# Patient Record
Sex: FEMALE | Race: BLACK OR AFRICAN AMERICAN | NOT HISPANIC OR LATINO | Employment: OTHER | URBAN - METROPOLITAN AREA
[De-identification: names, ages, dates, MRNs, and addresses within clinical notes are randomized per-mention and may not be internally consistent; named-entity substitution may affect disease eponyms.]

---

## 2017-01-03 ENCOUNTER — OFFICE VISIT (OUTPATIENT)
Dept: PSYCHIATRY | Facility: CLINIC | Age: 52
End: 2017-01-03
Payer: MEDICARE

## 2017-01-03 DIAGNOSIS — E11.8 TYPE 2 DIABETES MELLITUS WITH COMPLICATION, UNSPECIFIED LONG TERM INSULIN USE STATUS: ICD-10-CM

## 2017-01-03 DIAGNOSIS — Z01.818 PREOPERATIVE EVALUATION TO RULE OUT SURGICAL CONTRAINDICATION: Primary | ICD-10-CM

## 2017-01-03 DIAGNOSIS — E66.01 MORBID OBESITY DUE TO EXCESS CALORIES: ICD-10-CM

## 2017-01-03 PROCEDURE — 90791 PSYCH DIAGNOSTIC EVALUATION: CPT | Mod: S$PBB,,, | Performed by: PSYCHOLOGIST

## 2017-01-03 PROCEDURE — 90791 PSYCH DIAGNOSTIC EVALUATION: CPT | Mod: PBBFAC | Performed by: PSYCHOLOGIST

## 2017-01-03 PROCEDURE — 96102 PR PSYCHOLOGIC TESTING BY TECHNICIAN: CPT | Mod: PBBFAC | Performed by: PSYCHOLOGIST

## 2017-01-03 PROCEDURE — 96102 PR PSYCHOLOGIC TESTING BY TECHNICIAN: CPT | Mod: 59,S$PBB,, | Performed by: PSYCHOLOGIST

## 2017-01-03 PROCEDURE — 96101 PR PSYCHOLOGIC TESTING BY PSYCH/PHYS: CPT | Mod: PBBFAC | Performed by: PSYCHOLOGIST

## 2017-01-03 PROCEDURE — 96101 PR PSYCHOLOGIC TESTING BY PSYCH/PHYS: CPT | Mod: S$PBB,,, | Performed by: PSYCHOLOGIST

## 2017-01-03 NOTE — PROGRESS NOTES
Psychiatry Initial Visit (PhD/LCSW)   Diagnostic Interview - CPT 93200     Date: 2016    Site: [facility]     Referral source: Heriberto Clements M.D.     Clinical status of patient: Outpatient     Alivia Shearer, a 51 y.o. female, presented for initial evaluation visit. Before this evaluation was initiated, the purposes and process of the assessment and the limits of confidentiality were discussed with the patient who expressed understanding of these issues and orally consented to proceed with the evaluation.     Chief complaint/reason for encounter: Routine psychological evaluation prior to bariatric surgery.     Type of surgery sought: Sleeve    History of present illness: Ms. Shearer is a 51-year-old  female who is pursuing bariatric surgery to improve her health and quality of life. She has no history of significant psychological difficulties. She has never taken psychotropic medication, has never been hospitalized for psychiatric reasons, and denied any history of suicidality. She has begun making positive lifestyle changes in anticipation for surgery. The patient has a Body Mass Index of 60 as documented by the referring provider.    Ms. Shearer has struggled with weight since childhood, but has gained her largest amount of weight in the past 12 years since her mother . She believes that since her mother's death, she has been engaging more regularly in emotional eating as a result of grief. Additionally, she has been living with her father (age 94) for the past 3 years and has been his main caretaker, and watching his health slowly decline has led to further grief and emotional eating. Her main food weaknesses are starches (bread, rice, pasta) and fast food. She does not drink soda and does not tend to eat sweets much. However, she does admit that she tends to mindlessly snack (i.e. Eat half of a large can of cashews without meaning to while sitting in front of the TV). She  "also has the habit of cleaning her plate even when she is no longer hungry. Ms. Shearer has tried weight loss methods on her own, most notably Sugar Busters many years ago, when she lost 40 pounds. However, she has not made any recent weight loss attempts, mostly because she has gotten so heavy that she is fatigued often and exercise has become very challenging. She believes that her biggest weight loss challenge is "lack of motivation". Her motivation for seeking surgery now is to improve her health, particularly her Diabetes and her shortness of breath. Her postsurgical goals include: being able to go dancing again, and to walk around the [place] with her friends the way she used to, without needing frequent breaks.      Ms. Shearer has met with Ms. Rox Mitchell RD, bariatric dietician, and reports that she has made the following lifestyle changes since beginning the bariatric program: she has cut out fried foods, and she is drinking protein drinks for snacks. Her current weaknesses are limiting mayonnaise and salad dressing, and she does admit that she ate poorly during Lisbeth. She must continue meeting with Ms. Mitchell to demonstrate the implementation of lifestyle changes prior to clearance for bariatric surgery.    Medical history: Diabetes Type II, Sleep Apnea, Hyperlipidemia, DJD, Asthma    Pain: 8/10 - knees and back - takes Percocet as prescribed, states that she is "very careful" about how much she takes because of knowledge that it is addictive.    Psychiatric Symptoms:   Depression - She denied significant symptoms of depression; however, she does report increased crying over the holidays due to missing her mother, missing her girlfriend (whom she broke up with in September), and being sad about her father's condition. She was tearful in session, at appropriate times when discussing the loss of her mother. She denied any symptoms of anhedonia, hopelessness, thoughts of death, etc.  Vivian/Hypomania - " "denied significant symptoms of tracy/hypomania.  Anxiety - denied significant symptoms of anxiety; experienced one panic attack during sleep study 4 years ago.   Thoughts - denied delusions, hallucinations.  Suicidal thoughts/behaviors - denied.  Substance abuse - denied abuse or dependence.   Sleep - poor; estimates that she sleeps 2 hours per night because she is worried about her father falling and is always listening for him in the next room. She naps during the day which increases fatigue.  Self-injury - denied.    Current psychiatric treatment:  Medications: None.    Psychotherapy: None.    Treating clinicians: N/A    Health behaviors: Reported adherence to pharmacologic regimen.      Psychiatric history:   Previous diagnosis: Ms. Shearer has never sought psychiatric treatment in the past or received a psychiatric diagnosis. At the age of 18, she did suffer from an episode of depression when her eyesight became impaired due to severe conjuctivitis and she nearly became blind. No other episodes of depression. No history of anxiety besides one panic attack during sleep study. No history of tracy, psychosis, or suicidal ideation.    Previous hospitalizations: Denies.     History of outpatient treatment: Denies.    Previous suicide attempt: Denies.      Trauma history:  Denies.      Legal history:  She was caught stealing as a teenager.      History of eating disorders:  History of bulimia: Denies.    History of binge-eating episodes: Denies.      Family history of psychiatric illness: None reported.     Social history (marriage, employment, etc.): Ms. Shearer was born and raised in [place] by her biological parents, who were both teachers and she is the youngest of 7 siblings. She described her childhood as "beautiful" and reports that her family is still very close. She denies a history of childhood abuse. Ms. 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 She is a lesbian and recently broke up with a girlfriend due to her need to take care of father full time. She has never been  and has no children. She lives with her father in the [address] [place]. She identifies as Gnosticism.     Current psychosocial stressors: Father's health.    Report of coping skills: Prayer, crossword puzzles, talking to her close friends.    Support system: Her brother is also in workup for bariatric surgery right now, and they support each other. Her other siblings are also supportive of her desire to have surgery. She has told a few close friends. Although her father supports her health, he always wants to eat unhealthy foods ("lots of grease") and living with him has made it more difficult for her to eat well.    Substance use:   Alcohol: Denied current use; denied history of abuse or dependency.   Drugs: Denied current use; denied history of abuse or dependency.  Tobacco: None.   Caffeine: Denied routine use.    Current medications and drug reactions (include OTC, herbal): see medication list     Strengths and liabilities: Strength: Patient accepts guidance/feedback, Strength: Patient is expressive/articulate., Strength: Patient is intelligent., Strength: Patient is motivated for change., Strength: Patient has positive support network., Strength: Patient has reasonable judgment., Strength: Patient is stable.     Current Evaluation:    Mental Status Exam:   General Appearance:  age appropriate, well dressed, neatly groomed, overweight    Speech:  normal tone, normal rate, normal pitch, normal volume    Level of Cooperation:  cooperative    Thought Processes:  normal and logical    Mood:  euthymic    Thought Content:  normal, no suicidality, no homicidality, delusions, or paranoia    Affect:  congruent and appropriate    Orientation:  oriented x3    Memory:  recent memory intact; immediate and delayed word recall " "3/3  remote memory intact; able to recall remote personal events   Attention Span & Concentration:  spelled "WORLD" forwards and backwards without errors   Fund of General Knowledge:  appropriate for education    Abstract Reasoning:  interpretation of proverbs was abstract; interpretation of similarities was abstract   Judgment & Insight:  good    Language  intact        Diagnostic Impression - Plan:      Diagnostic Impression:  Z01.818 Pre-operative examination   E66.01   Morbid obesity  E11.8     Diabetes Type II  Z68.44    Body Mass Index of 60    Summary/Conclusion:   There are no overt psychological contraindications for proceeding with bariatric surgery. Ms. Shearer has no significant psychiatric history, and reports no current psychiatric problems or major adjustment issues. However, it is clear that she is struggling with her father's failing health as well as her recent breakup as she was tearful when discussing these issues, and she recognizes a relationship between her difficult emotions and her eating. It would benefit Ms. Shearer to seek counseling to help her improve coping skills and to provide her with a space to discuss her struggles, as she does not want to do so with family or friends.     Recommendations:  -This patient is psychologically cleared to proceed with bariatric surgery; however, psychotherapy is highly recommended for the reasons stated above. Ms. Shearer has agreed with this recommendation and has been set up to continue seeing me for semi-regular psychotherapy.    Please see Psychological Testing report available in Notes tab of Chart Review in Epic for results of psychological testing.  "

## 2017-01-03 NOTE — PSYCH TESTING
[facility] [address] [place]  [address]  [phone]    REPORT OF PSYCHOLOGICAL TESTING    NAME: Alivia Shearer  OC #: [id number]  : 1965    REFERRED BY: Heriberto Clements M.D.    EVALUATED BY:  Dinorah Suárez, Ph.D., Clinical Psychologist  Wilbert Hays M.S., Psychometrician    DATES OF EVALUATION: 2016, 2017    EVALUATION PROCEDURES AND TIMES:  Conducted by Psychologist:  Interpretation and report of test data  Integration of information from diagnostic interview, medical record, and testing data  Conducted by Technician:  [place] Multiphasic Personality Inventory - 2 Restructured Form (MMPI-2RF)  [facility] Behavioral Medicine Diagnostic (MBMD)  CPT Codes and Time:  11466 - 2 hours; 51138 - 2 hours    EVALUATION FINDINGS:  Before this evaluation was initiated, the purposes and process of the assessment and the limits of confidentiality were discussed with the patient who expressed understanding of these issues and orally consented to proceed with the evaluation.    Ms. Shearer has struggled with weight since childhood, but has gained her largest amount of weight in the past 12 years since her mother . She believes that since her mother's death, she has been engaging more regularly in emotional eating as a result of grief. Additionally, she has been living with her father (age 94) for the past 3 years and has been his main caretaker, and watching his health slowly decline has led to further grief and emotional eating. Her main food weaknesses are starches (bread, rice, pasta) and fast food. She does not drink soda and does not tend to eat sweets much. However, she does admit that she tends to mindlessly snack (i.e. Eat half of a large can of cashews without meaning to while sitting in front of the TV). She also has the habit of cleaning her plate even when she is no longer hungry. Ms. Shearer has tried weight loss methods on her own, most notably Sugar  "Busters many years ago, when she lost 40 pounds. However, she has not made any recent weight loss attempts, mostly because she has gotten so heavy that she is fatigued often and exercise has become very challenging. She believes that her biggest weight loss challenge is "lack of motivation". Her motivation for seeking surgery now is to improve her health, particularly her Diabetes and her shortness of breath. Her postsurgical goals include: being able to go dancing again, and to walk around the [place] with her friends the way she used to, without needing frequent breaks.    Ms. Shearer denies a significant psychiatric history and has never attended mental health treatment. She denies a history of eating disorder, but does engage in binge-like episodes at night.    At her initial consultation with Ms. Belgicanikkie Pateltodd on 12/13/2016, her BMI was 60. Her current medical comorbidities include: Diabetes Type II, Hyperlipidemia, DJD, Sleep Apnea, and Asthma. She has met with Ms. Rox Mitchell RD, bariatric dietician and was well-informed regarding the details of the procedure and post-operative eating restrictions. She has a good understanding regarding the risks and benefits of the procedure and appears motivated for change. She was fully cooperative and engaged in the assessment process.    Ms. Shearer produced a valid and interpretable MMPI-2RF protocol; therefore, her test results should be considered an accurate representation of her current symptoms and problems. Ms. Ferrara profile is in the normal range for all symptom categories, indicating that she does not currently struggle with any severe psychiatric problems, symptoms, or adjustment issues.    The MBMD indicated that Ms. Shearer is not reporting any significant psychiatric problems at this time. However, she is reporting more suspiciousness toward others than the typical medical patient and may therefore be guarded and vigilant. In general, Ms. Shearer is " reluctant to disclose her emotions, seeking to maintain control at all costs. Because she thinks of herself as strong and capable, being a patient will be very difficult for her, and she may be sarcastic or irritable with treatment providers. Ms. Shearer identified her spiritual sharan as her coping mechanism for stress. There is little reason to believe that her psychological characteristics will lead to a complicated recovery from surgery. Test results indicate that, compared to the average bariatric surgery patient, there is an average chance that she will engage in appropriate behavioral changes to support optimal surgery results, and there is a good chance that surgery will improve her quality of life. Ms. Shearer is likely to benefit from a bariatric support group and a nutritional instruction plan after surgery.     DIAGNOSTIC IMPRESSIONS:  Z68.44    Body Mass Index of 60  Z01.818  Pre-operative Exam  E66.901  Morbid  Obesity    SUMMARY AND RECOMMENDATIONS:  Ms. Shearer has a long history of weight problems and is pursuing bariatric surgery at this time in an effort to improve her health and quality of life. Results of personality testing should be considered valid, and they indicate that she is not currently suffering from any psychiatric symptoms or problems that would contraindicate surgery.   .

## 2017-01-17 NOTE — TELEPHONE ENCOUNTER
----- Message from Shaun Cristina Jr. sent at 1/17/2017  8:44 AM CST -----  X_  1st Request  _  2nd Request  _  3rd Request    Please refill the medication(s) listed below. Please call the patient when the prescription(s) is ready for  at the phone number [phone]    Medication #1  oxycodone-acetaminophen (PERCOCET)  mg per tablet 120 tablet 0 12/16/2016 1/15/2017 No  Sig - Route: Take 1 tablet by mouth every 6 (six) hours as needed for Pain. - Oral  Class: Print  Order: [id number]  Cosign for Ordering: Accepted by Lina Wagner MD on 12/15/2016  3:23 PM  Date/Time Signed: 12/15/2016  8:34 AM      Patient was contacted and scheduled for a follow up visit for her med refill         Medication #2      Preferred Pharmacy:    [facility] [address]

## 2017-01-18 ENCOUNTER — OFFICE VISIT (OUTPATIENT)
Dept: PAIN MEDICINE | Facility: CLINIC | Age: 52
End: 2017-01-18
Payer: MEDICARE

## 2017-01-18 VITALS
WEIGHT: 293 LBS | TEMPERATURE: 98 F | SYSTOLIC BLOOD PRESSURE: 146 MMHG | BODY MASS INDEX: 48.82 KG/M2 | DIASTOLIC BLOOD PRESSURE: 86 MMHG | HEART RATE: 91 BPM | RESPIRATION RATE: 20 BRPM | HEIGHT: 65 IN

## 2017-01-18 DIAGNOSIS — M25.562 CHRONIC PAIN OF BOTH KNEES: ICD-10-CM

## 2017-01-18 DIAGNOSIS — G89.29 CHRONIC PAIN OF BOTH KNEES: ICD-10-CM

## 2017-01-18 DIAGNOSIS — M25.561 CHRONIC PAIN OF BOTH KNEES: ICD-10-CM

## 2017-01-18 DIAGNOSIS — T84.018D FAILED TOTAL KNEE ARTHROPLASTY, SUBSEQUENT ENCOUNTER: ICD-10-CM

## 2017-01-18 DIAGNOSIS — Z96.659 FAILED TOTAL KNEE ARTHROPLASTY, SUBSEQUENT ENCOUNTER: ICD-10-CM

## 2017-01-18 DIAGNOSIS — Z96.651 STATUS POST TOTAL RIGHT KNEE REPLACEMENT: ICD-10-CM

## 2017-01-18 DIAGNOSIS — Z79.891 ENCOUNTER FOR LONG-TERM OPIATE ANALGESIC USE: ICD-10-CM

## 2017-01-18 DIAGNOSIS — M47.816 FACET ARTHRITIS OF LUMBAR REGION: Primary | ICD-10-CM

## 2017-01-18 DIAGNOSIS — M47.816 LUMBAR FACET ARTHROPATHY: ICD-10-CM

## 2017-01-18 PROCEDURE — 99214 OFFICE O/P EST MOD 30 MIN: CPT | Mod: S$PBB,,, | Performed by: NURSE PRACTITIONER

## 2017-01-18 PROCEDURE — 99999 PR PBB SHADOW E&M-EST. PATIENT-LVL III: CPT | Mod: PBBFAC,,, | Performed by: NURSE PRACTITIONER

## 2017-01-18 PROCEDURE — 80307 DRUG TEST PRSMV CHEM ANLYZR: CPT

## 2017-01-18 PROCEDURE — 99213 OFFICE O/P EST LOW 20 MIN: CPT | Mod: PBBFAC | Performed by: NURSE PRACTITIONER

## 2017-01-18 RX ORDER — OXYCODONE AND ACETAMINOPHEN 10; 325 MG/1; MG/1
1 TABLET ORAL EVERY 6 HOURS PRN
Qty: 120 TABLET | Refills: 0 | Status: SHIPPED | OUTPATIENT
Start: 2017-01-18 | End: 2017-02-15 | Stop reason: SDUPTHER

## 2017-01-18 NOTE — PROGRESS NOTES
Subjective:      Patient ID: Alivia Thomas is a 51 y.o. female.    Chief Complaint: Knee Pain (one month follow up)    Referred by: No ref. provider found     Interval History 1/18/2017:  The patient returns for follow up and medication refill.  She reports no major changes in her back and knee pain since her last visit.  She has had a lot going on with health issues of family members.  She takes care of her father and her brother, who were both recently hospitalized.  She still plans on having weight loss surgery in the future.  Her pain today is.  She is taking Percocet with benefit and without side effects at this time.    Interval History 12/15/2016:  The patient returns today for follow up of lower back and bilateral knee pain.  She continues to report relief from cooled lumbar RFAs in October.  She feels as though the colder weather is causing increased knee pain.  Since her last visit, she has decided to have weight loss surgery.  She was evaluated by bariatrics and is undergoing pre-op workup at this time.  Her pain today is 8/10.  She continue to take Percocet with significant benefit.      Interval History 11/3/2016:  The patient returns today for follow up of back pain.  She is s/p left then right L2,3,4,5 cooled RFA completed on 10/19/16 with 80% pain relief.  She is very satisfied with these results.  She continues to take Percocet with relief.  She has started kick boxing classes and continues to lose weight.  She has noticeable weight loss since her last visit and is very happy about this.  Her pain today is 8/10.    Interval History 9/16/2016:  The patient returns today with complaints of lower back and knee pain.  Her worst pain is in her lower back without radiation.  She has lost weight since her last weight.  She has increased her exercise which is helping.  She continues with her diet plan.  She is having the pool at her home fixed and plans to use this for exercise, as she has benefited  from frequent pool therapy at [facility].  She previously had significant relief with lumbar RFAs in April for about 4 months.  She would like to repeat the procedures.  She continues to take Percocet as needed which provides her relief.  Her pain today is 8/10.  The patient denies any bowel or bladder incontinence or signs of saddle paresthesia.      Interval History 8/19/2016:  The patient returns today for follow up and medication refill.  She complains of back and knee pain.  Her back pain does not radiate.  She did have relief with RFA in April but feels the pain is returning.  Her previous UTI has resolved.  She has been unable to return to her aquatic therapy because she is caring for her father.  She plans to start walking in the morning before her father wakes up.  She has gained a few pounds since her last visit and is upset about this, as she was previously losing at each visit.  She is also trying to control her diet.  She continues to take Percocet with significant pain relief.  Her pain today is 8/10.  The patient denies any bowel or bladder incontinence or signs of saddle paresthesia.  The patient denies any major medical changes since last office visit.    Interval History 7/19/2016:  The patient returns today for follow up.  She has a history of lower back and bilateral knee pain.  Since her last visit, she reports being diagnosed with a UTI and is currently on antibiotics. She has still been unable to return to aquatherapy.  She has been performing a home exercise routine.  She has lost 6 lbs since her visit last month.  She is taking Percocet as needed for pain.  She reports efficacy without adverse effects.  Her pain today is 7/10.      Interval History 6/20/2016:  Patient returns for follow up and medication refill.  She has a history of lower back and bilateral knee pain.  Since her last encounter, she reports that she has been suffering with an upper respiratory infection.  She saw   Debra last week and was started on oral Augmentin and antibiotic eye drops.  She reports that whenever she is sick, she suffers with swelling and drainage to her left eye.  She states that her symptoms have improved since starting the eye drops.  She has been unable to participate in her daily pool therapy since she has been sick but is anxious to resume once she is feeling better.  She did have recent labwork which showed an improving A1C with cholesterol and triglycerides WNL.  She is proud of herself about this, as she reports be very good with her diet recently.  Her pain today is an 8/10.    Interval History 5/5/2016:  Patient returns today for procedure follow up.  She is s/p left then right L2,3,4,5 RFA completed on 4/20/16 with 70% pain relief so far.  She reports lower back and bilateral knee pain.  She has had genicular nerve blocks in the past which provided significant relief for her left knee pain and limited relief of her right knee pain.  She is currently doing physical therapy per self.  She is doing 45 minutes of pool therapy five days per week.  She thinks that this is helping with her pain and mobility.  She is trying to lose weight because she is aware that this will help with her pain.  She is taking percocet as needed which provided relief without side effects.  Her pain today is a 5/10.      Interval History: 3/28/2016:  Patient returns today for follow up of lower back and bilateral knee pain.  She is s/p Bilateral L2,3,4,5 MBB on 2/16/16 with 80% relief for 5 days and bilateral L2,3,4,5 MBB on 3/1/16 with 70% pain relief for 1 day.  She is requesting to schedule the RFAs.  The worst of her pain is located to her left lower back and does not radiate. She is still complaining of bilateral knee pain which is worse with walking and activity.  Her pain today is a 9/10.  The patient denies any bowel/bladder incontinence or symptoms of saddle paresthesia.  The patient denies any major medical  changes since last OV. She is currently taking Percocet which helps her pain without any adverse effects.     Interval History: 12/17/2015:  Patient presents in clinic for follow up for lower back, bilateral knee, and right arm pain. Her pain is 9/10 today. She underwent carpal tunnel revision 12/14/15 in the right wrist. She is currently out of her Percocet 10-325mg prescription.   Cont to have significant low back pain, wh will also ich she reports is her worst current pain.  Based on previous imaging we know that the patient has significant facet arthropathy in the lumbar spine at the levels of L3-4 and L4-5 L5-S1.  She describes dull achy with occasional sharp pain rates as 7/10.     Interval history 10/26/2015:  Patient returns to clinic for follow up previously seen for knee pain and low back pain. She reports pain is improved with Percocet 10/325 BID. She is no longer taking Lyrica and recently started Topamax. She continues to take Celebrex once per day and does help. She also continues to use a topical cream PRN and does help some. She has completed therapy since last visit but she is continuing to exercise regularly. Her pain in back and knees is unchanged in quality and distribution from previous visits. She otherwise denies any new issues at this time.     Interval history 9/21/2015:  Since previous encounter the patient comes in after having started using gabapentin and developing swelling in her legs.  She states that it did make a difference for her pain although she discontinued it secondary to swelling after a decrease in her dosing did not alleviate this.  She followed up with her orthopedist and did have x-rays performed which did not show any significant change compared to previous.  She is scheduled for a four-month follow-up.  She has not yet begun exercising but will begin soon she is scheduled for pool-based therapy.  The opioid medications have been helping her and her pain twice a day.   Further decrease to once a day prevented her from being able to function.  She does continue to take Celebrex without adverse reaction.  Additionally the patient stated that she has been having lower back pain which is new.    Interval History 08-: Since previous visit patient comes in today to discuss her medications.  Patient states she is having pain in the lower back and both knee, sharp , throbbing , burning, and stabbing pain, she rates it 8/10.  Patient is taking percocet 10/325mg, we have been weaning her from this medication last prescription was provided for 30 tablets.  Additionally the patient is taking Celebrex with regularity with some improvement in her pain.  She has completed physical therapy status post knee replacement her knee hardware appears appropriate she has a scheduled appointment to follow-up with her orthopedic surgeon in one week to discuss using a extension brace while she is at home laying in bed.  She continues to swim twice a week and is actively trying to lose weight and has been dieting.    Interval History 06/19/2015:  Patient presents in clinic for two month follow up. She reports her bilateral knee pain and low back pain is an 8/10. She currently takes celebrex and Percocet for pain and uses a topical cream.  She was recently evaluated by her orthopedist and the hardware all appears to be appropriately placed and the next follow-up is in 6 weeks.  The patient continues to work on weight loss and exercise and states that she has been doing more than in the past.   Patient reports no other health changes since previous encounter.    Interval History 04/09/2015:  Patient presents in clinic for one month follow up. She reports bilateral knee pain and low back pain is a 9/10 today. She currently takes percocet for pain as needed and uses a cane for ambulation. She states that the low back pain is new.  She continues to have bilateral knee pain and is in physical therapy.  She  continues to take Celebrex daily and uses a topical pain cream regularly.    Patient reports no other health changes since previous encounter.    Interval history 3/5/2015:  Since previous encounter patient is status post right total knee replacement on 11/4/2014 and has been healed with postoperative visits showing good progress.  The patient does have continued physical therapy sessions and has been attempting to lose weight and has lost approximately 25 pounds although her BMI continues to be 54.  She has been making good efforts to try and continue to increase her range of motion and lose weight.  She continues to have significant pain in bilateral knees and continues to use a cane for ambulation.  She was receiving oxycodone/acetaminophen 10/325 every 8 hours by mouth when necessary and requiring in order to persist in her physical therapy although the topical pain cream that she has been applying has been helping her to a limited degree she still requires the medication regularly.  Her recent x-ray imaging shows good positioning of the prosthesis.  No other health changes since previous encounter.     Interval history 2/17/2014:  Patient reports that she has been using the topical compounded cream on the knee approximately 4 times per day and she states that it does help her with her pain that she is experiencing.  She states that she has not gone to formal physical therapy but that she has been going to a pool that has exercise classes which is free for her and that she feels like it is helping her continue to lose weight.  Additionally she continues using hydrocodone/acetaminophen 7.5/750 approximately 3 times per day when necessary and states that also helps with her pain symptoms.  He she's still talks to have replacement for the left knee, but would like to lose weight further before going to that.  She has also had previous injections into her knees which have offered little to no relief in her pain  symptoms.  She has had no other health changes since previous encounter.    Previous encounter 1/21/2014:  HPI Comments: 49 yo female presents for initial evaluation of bilateral knee pain, L>R. She is s/p arthroscopic right knee surgery and eventual replacement and then revision surgeries (Dr. Swan, Orthopedics). The pain is present in both knees and is described as a terrible ache. She hears occasional popping in both knees with movement. The pain is worse with being on her feet and getting up from a sitting to standing position. Denies lower ext weakness or paresthesias. She does not have back pain or pain radiating down her legs. The pain is better with Celebrex and rest. She also takes Vicodin ES TID but this makes her very sleepy. She is not sure it helps with the pain because she usually falls asleep.     Physical Therapy: not since 2011 just prior to her 3rd right knee surgery (revision after replacement); has tried swimming which helps with weight loss    Non-pharmacologic Treatment: none    Pain Medications: Percocet and celebrex    Blood thinners: ASA 81 mg daily     Interventional Therapies: steroid inj and visco-supplementation (series of 3) in left knee- not helpful  3/31/14 Bilateral genicular nerve block- significant relief of left knee, limited relief of right knee pain  2/16/16 Bilateral L2,3,4,5 MBB  3/1/16 Bilateral L2,3,4,5 MBB   4/6/16 Left L2,3,4,5 RFA- significant relief  4/20/16 Right L2,3,4,5 RFA- significant relief  10/5/16 Left L2,3,4,5 cooled RFA  10/19/16 Right L2,3,4,5 cooled RFA      Relevant Surgeries: right knee surgery x3 (arthroscopic, then replacement and subsequent revision surgery), s/p left total knee arthroplasty    Relevant Imaging:  Xray Lumbar spine 09/21/2015  Lumbar spine radiograph    Comparison: None    Results: AP, lateral neutral, lateral flexion , lateral extension, bilateral oblique and spot views. The alignment of the lumbar spine demonstrates a mild  levoscoliosis . 11-mm anterior listhesis of L4 relative to L5 with no translational abnormalities  seen on flexion and extension views. The vertebral body heights are well-maintained , mild disk space narrowing L4-L5 and L5-S1. Mild anterior and marginal osteophyte formation seen throughout the lumbar spine . The oblique views demonstrate no   definite spondylolysis. There is facet joint osseous hypertrophy noted at L3-L4 and L4-L5.      Impression         The Significant spondylosis of the lumbar spine with grade 1 anterior listhesis L4 relative to L5.  Facet joint osseous hypertrophy L3-L4 and L4-5.      Electronically signed by: BLESSING ROE MD  Date: 09/21/15  Time: 09:56           knee x-rays (see below) x-ray knee bilateral 8/26/2015:  Standing AP knees, lateral view of both knees and sunrise view of both patella. Study compared to May 2015. Postop change of bilateral knee replacement. The prosthetic components are in satisfactory position. Erosive changes involving the patella   again evident bilaterally.    Impression no significant change.      Xray Bilateral Knee 05/25/2015:  There are bilateral total knee arthroplasties with posterior resurfacing of the patella. As observed on 12/17/2014 there is a fracture of the left patella in the parasagittal plane.    Heterotopic bone is evident about each knee.    I detect no dislocation, unusual radiopaque retained foreign body, lytic or blastic lesion, or chondrocalcinosis.    Lab Results   Component Value Date    HGBA1C 10.6 (H) 12/15/2016     Lab Results   Component Value Date    CHOL 200 (H) 12/15/2016    CHOL 153 06/17/2016    CHOL 157 04/01/2015     Lab Results   Component Value Date    HDL 40 12/15/2016    HDL 44 06/17/2016    HDL 44 04/01/2015     Lab Results   Component Value Date    LDLCALC 141.0 12/15/2016    LDLCALC 92.6 06/17/2016    LDLCALC 97.2 04/01/2015     Lab Results   Component Value Date    TRIG 95 12/15/2016    TRIG 82 06/17/2016     TRIG 79 04/01/2015     Lab Results   Component Value Date    CHOLHDL 20.0 12/15/2016    CHOLHDL 28.8 06/17/2016    CHOLHDL 28.0 04/01/2015         Past Medical History   Diagnosis Date    Asthma     Diabetes mellitus type II     DJD (degenerative joint disease) of knee 6/19/2014    Hyperlipidemia     Morbid obesity     Sleep apnea      Past Surgical History   Procedure Laterality Date    Carpal tunnel release      Carpal tunnel release  1980s     left    Carpal tunnel release  2012     right    Knee surgery  3/2010     orthroscope    Knee surgery  6-19-14     left TKR    Joint replacement Bilateral      with 2 revisions on rt     Family History   Problem Relation Age of Onset    Diabetes Mother     Hypertension Mother     Cataracts Mother     Diabetes Father     Cataracts Father     Coronary artery disease Brother     Amblyopia Neg Hx     Blindness Neg Hx     Cancer Neg Hx     Glaucoma Neg Hx     Macular degeneration Neg Hx     Retinal detachment Neg Hx     Strabismus Neg Hx     Stroke Neg Hx     Thyroid disease Neg Hx      Social History     Social History    Marital status: Single     Spouse name: N/A    Number of children: N/A    Years of education: N/A     Occupational History    Not on file.     Social History Main Topics    Smoking status: Never Smoker    Smokeless tobacco: Never Used    Alcohol use Yes      Comment: occasionally     Drug use: No    Sexual activity: Yes     Partners: Female     Birth control/ protection: None     Other Topics Concern    Not on file     Social History Narrative    Disabled. The patient is the youngest of 6 siblings. Single. Lives with single-sex partner.                      Review of patient's allergies indicates:  No Known Allergies    Current Outpatient Prescriptions:     albuterol 90 mcg/actuation inhaler, Take 2 puffs every 4-6 hours  as needed for shortness of breath/wheezing, Disp: 1 Inhaler, Rfl: 6    aspirin (ECOTRIN) 81 MG EC  tablet, Take 1 tablet by mouth every morning. prevents heart attacks and strokes, Disp: , Rfl:     atorvastatin (LIPITOR) 20 MG tablet, Take 1 tablet (20 mg total) by mouth once daily., Disp: 30 tablet, Rfl: 6    blood sugar diagnostic Strp, Freestyle light strips and lancets, Disp: 100 each, Rfl: 6    celecoxib (CELEBREX) 200 MG capsule, One capsule daily, Disp: 30 capsule, Rfl: 2    econazole nitrate 1 % cream, Apply topically 2 (two) times daily., Disp: 30 g, Rfl: 2    ergocalciferol (ERGOCALCIFEROL) 50,000 unit Cap, Take 1 capsule (50,000 Units total) by mouth twice a week., Disp: 24 capsule, Rfl: 0    fluconazole (DIFLUCAN) 150 MG Tab, One daily for 3 days, Disp: 3 tablet, Rfl: 3    insulin detemir (LEVEMIR FLEXPEN) 100 unit/mL (3 mL) SubQ InPn pen, Inject 20 Units into the skin every evening., Disp: 1 Box, Rfl: 11    insulin lispro (HUMALOG) 100 unit/mL injection, Inject 11 Units into the skin 3 (three) times daily before meals. Plus Sliding scale, Disp: 3 mL, Rfl: 11    lisinopril (PRINIVIL,ZESTRIL) 2.5 MG tablet, One daily for proteinuria., Disp: 30 tablet, Rfl: 6    metformin (GLUCOPHAGE-XR) 500 MG 24 hr tablet, Take 2 tablets (1,000 mg total) by mouth 2 (two) times daily., Disp: 360 tablet, Rfl: 6    omeprazole (PRILOSEC) 20 MG capsule, Take 1 capsule (20 mg total) by mouth every morning., Disp: 30 capsule, Rfl: 6    vitamin D 185 MG Tab, Take 5,000 mg by mouth once daily., Disp: , Rfl:     REVIEW OF SYSTEMS:    GENERAL:  Patient is actively trying to lose weight, but has no malaise or fevers.  RESPIRATORY:  Negative for cough, wheezing or shortness of breath, patient denies any recent URI.  CARDIOVASCULAR:  Negative for chest pain, leg swelling or palpitations.  GI:  Negative for abdominal discomfort, blood in stools or black stools or change in bowel habits, occasional constipation.  MUSCULOSKELETAL:  See HPI.  SKIN:  Negative for lesions, rash, and itching.  PSYCH:  No mood disorder or  "recent psychosocial stressors.  Patient's sleep is disturbed secondary to pain (patient reports also that she has insomnia).  HEMATOLOGY/LYMPHOLOGY:  Negative for prolonged bleeding, bruising easily or swollen nodes.  81 mg aspirin  ENDO: Patient has a history of diabetes  NEURO:   No history of headaches, syncope, paralysis, seizures or tremors.  All other reviewed and negative other than HPI.    OBJECTIVE:    Visit Vitals    BP (!) 146/86    Pulse 91    Temp 98.1 °F (36.7 °C) (Oral)    Resp 20    Ht 5' 5" (1.651 m)    Wt (!) 163 kg (359 lb 5.6 oz)    BMI 59.8 kg/m2       PHYSICAL EXAMINATION:    GENERAL: Well appearing, in no acute distress, alert and oriented x3.   PSYCH:  Mood and affect appropriate.  SKIN: Skin color, texture, turgor normal, no rashes or lesions.  HEAD/FACE:  Normocephalic, atraumatic.   CV: RRR with palpation of the radial artery.  PULM: No evidence of respiratory difficulty, symmetric chest rise.  BACK: No pain to palpation over the lumbar facet joints.  Limited lumbar extension with pain.  Full ROM on flexion.  Positive bilateral facet loading.  Negative SLR bilaterally.  Pain with palpation to bilateral SI joints.  JENNA is negative bilaterally.  EXTREMITIES:  Well-healed midline scars to bilateral knees.  Painful extension and flexion of bilateral knees.  Medial and lateral joint line tenderness to bilateral knees.    MUSCULOSKELETAL: Bilateral upper and lower extremity strength is normal and symmetric.  No atrophy or tone abnormalities are noted.  NEURO: Bilateral lower extremity coordination and muscle stretch reflexes are physiologic and symmetric.  Plantar response are downgoing. No clonus.  No loss of sensation is noted.  GAIT: Antalgic- ambulating without assistance.       Assessment:       Encounter Diagnoses   Name Primary?    Facet arthritis of lumbar region Yes    Failed total knee arthroplasty, subsequent encounter     Lumbar facet arthropathy     Chronic pain of both " knees     Status post total right knee replacement     Encounter for long-term opiate analgesic use          Plan:       - Previous imaging was reviewed and discussed with the patient today.     - Continue to f/u with bariatrics for possible gastric sleeve surgery.    - Can repeat cooled RFA after April 2017.    - Continue oxycodone-acetaminophen 10/325 one tablet every 6 hours PRN pain.  This will be e-prescribed by Dr. Wagner.    - The [place] Board of Pharmacy website for prescription monitoring was consulted today, and it does not suggest any deviations in conflict with the patient's controlled substance contract with our clinic. Will continue current therapy with frequent monitoring of the controlled substance database, and urine drug screens on followup.     - UDS from 7/21/16 was reviewed and is compliant.  UDS was performed today.  Preliminary results are consistent with medications as prescribed and negative for illicit substances.  This may be negative for her medication as she is overdue.    - Continue topical pain cream PRN.    - RTC in 1 month or sooner if needed.    - Dr. Wagner was consulted on the patient and agrees with this plan.      The above plan and management options were discussed at length with patient. Patient is in agreement with the above and verbalized understanding.     Virginia Sanchez, EMILY  01/18/2017

## 2017-01-18 NOTE — MR AVS SNAPSHOT
[facility]  [address]  [place] [address]  Phone: [phone]  Fax: [phone]                  Alivia Thomas   2017 8:00 AM   Office Visit    Description:  Female : 1965   Provider:  EMILY Huggins   Department:  [facility]           Reason for Visit     Knee Pain           Diagnoses this Visit        Comments    Facet arthritis of lumbar region    -  Primary     Failed total knee arthroplasty, subsequent encounter         Lumbar facet arthropathy         Chronic pain of both knees         Status post total right knee replacement         Encounter for long-term opiate analgesic use                To Do List           Future Appointments        Provider Department Dept Phone    2017 7:30 AM [facility] - Optometry [phone]    2/15/2017 8:00 AM EMILY Huggins [facility] [phone]    3/27/2017 9:15 AM Theodore Swan MD [facility] - Orthopedics [phone]    3/29/2017 8:00 AM Stacey Rowland D[facility] - Podiat[facility] [phone]      Goals (5 Years of Data)     None      [facility]     [facility] Nurse [facility] Line -  Assistance  Registered nurses in the [facility] On Call Center provide clinical advisement, health education, appointment booking, and other advisory services.  Call for this free service at [phone].             Medications           Message regarding Medications     Verify the changes and/or additions to your medication regime listed below are the same as discussed with your clinician today.  If any of these changes or additions are incorrect, please notify your healthcare provider.             Verify that the below list of medications is an accurate representation of the medications you are currently taking.  If none reported, the list may be blank. If incorrect, please contact your healthcare provider. Carry this list with you in case of  "emergency.           Current Medications     albuterol 90 mcg/actuation inhaler Take 2 puffs every 4-6 hours  as needed for shortness of breath/wheezing    aspirin (ECOTRIN) 81 MG EC tablet Take 1 tablet by mouth every morning. prevents heart attacks and strokes    atorvastatin (LIPITOR) 20 MG tablet Take 1 tablet (20 mg total) by mouth once daily.    blood sugar diagnostic Strp Freestyle light strips and lancets    celecoxib (CELEBREX) 200 MG capsule One capsule daily    econazole nitrate 1 % cream Apply topically 2 (two) times daily.    ergocalciferol (ERGOCALCIFEROL) 50,000 unit Cap Take 1 capsule (50,000 Units total) by mouth twice a week.    fluconazole (DIFLUCAN) 150 MG Tab One daily for 3 days    insulin detemir (LEVEMIR FLEXPEN) 100 unit/mL (3 mL) SubQ InPn pen Inject 20 Units into the skin every evening.    insulin lispro (HUMALOG) 100 unit/mL injection Inject 11 Units into the skin 3 (three) times daily before meals. Plus Sliding scale    lisinopril (PRINIVIL,ZESTRIL) 2.5 MG tablet One daily for proteinuria.    metformin (GLUCOPHAGE-XR) 500 MG 24 hr tablet Take 2 tablets (1,000 mg total) by mouth 2 (two) times daily.    omeprazole (PRILOSEC) 20 MG capsule Take 1 capsule (20 mg total) by mouth every morning.    vitamin D 185 MG Tab Take 5,000 mg by mouth once daily.           Clinical Reference Information           Vital Signs - Last Recorded  Most recent update: 1/18/2017  8:07 AM by Daisy Draper MA    BP Pulse Temp Resp Ht Wt    (!) 146/86 91 98.1 °F (36.7 °C) (Oral) 20 5' 5" (1.651 m) (!) 163 kg (359 lb 5.6 oz)    BMI                59.8 kg/m2          Blood Pressure          Most Recent Value    BP  (!)  146/86      Allergies as of 1/18/2017     No Known Allergies      Immunizations Administered on Date of Encounter - 1/18/2017     None      Orders Placed During Today's Visit      Normal Orders This Visit    [facility] Drug Screen       [facility] Sign-Up     Activating your [facility] account is " as easy as 1-2-3!     1) Visit [email or web address], select Sign Up Now, enter this activation code and your date of birth, then select Next.  [email or web address]  Expires: 1/27/2017 10:35 AM      2) Create a username and password to use when you visit [facility] in the future and select a security question in case you lose your password and select Next.    3) Enter your e-mail address and click Sign Up!    Additional Information  If you have questions, please e-mail [email or web address] or call [phone] to talk to our [facility] staff. Remember, [facility] is NOT to be used for urgent needs. For medical emergencies, dial 911.

## 2017-01-24 ENCOUNTER — OFFICE VISIT (OUTPATIENT)
Dept: OPTOMETRY | Facility: CLINIC | Age: 52
End: 2017-01-24
Payer: MEDICARE

## 2017-01-24 DIAGNOSIS — H52.4 MYOPIA WITH ASTIGMATISM AND PRESBYOPIA, BILATERAL: ICD-10-CM

## 2017-01-24 DIAGNOSIS — H52.203 MYOPIA WITH ASTIGMATISM AND PRESBYOPIA, BILATERAL: ICD-10-CM

## 2017-01-24 DIAGNOSIS — E11.9 TYPE 2 DIABETES MELLITUS WITHOUT RETINOPATHY: Primary | ICD-10-CM

## 2017-01-24 DIAGNOSIS — H25.13 NUCLEAR SCLEROSIS, BILATERAL: ICD-10-CM

## 2017-01-24 DIAGNOSIS — H52.13 MYOPIA WITH ASTIGMATISM AND PRESBYOPIA, BILATERAL: ICD-10-CM

## 2017-01-24 PROCEDURE — 99999 PR PBB SHADOW E&M-EST. PATIENT-LVL II: CPT | Mod: PBBFAC,,, | Performed by: OPTOMETRIST

## 2017-01-24 PROCEDURE — 92014 COMPRE OPH EXAM EST PT 1/>: CPT | Mod: S$PBB,,, | Performed by: OPTOMETRIST

## 2017-01-24 PROCEDURE — 99212 OFFICE O/P EST SF 10 MIN: CPT | Mod: PBBFAC,PO | Performed by: OPTOMETRIST

## 2017-01-24 NOTE — PROGRESS NOTES
HPI     DLS:  4/7/16  Pt here for diabetic eye check.  Pt without visual complaints today OU.  Diabetic eye exam  (+)floaters  (-)flashes  (-)itching  (-)burning  (-)eye pain  (-)headaches    Hemoglobin A1C       Date                     Value               Ref Range             Status                12/15/2016               10.6 (H)            4.5 - 6.2 %           Final              Comment:    According to ADA guidelines, hemoglobin A1C <7.0% represents  optimal   control in non-pregnant diabetic patients.  Different  metrics may apply   to specific populations.   Standards of Medical Care in Diabetes -   2016.  For the purpose of screening for the presence of diabetes:  <5.7%       Consistent with the absence of diabetes  5.7-6.4%  Consistent with   increasing risk for diabetes   (prediabetes)  >or=6.5%  Consistent with   diabetes  Currently no consensus exists for use of hemoglobin A1C  for   diagnosis of diabetes for children.         06/17/2016               9.0 (H)             4.5 - 6.2 %           Final                 01/25/2016               9.6 (H)             4.5 - 6.2 %           Final            ----------       Last edited by Justin Dodson, OD on 1/24/2017  8:38 AM.     ROS     Negative for: Constitutional, Gastrointestinal, Neurological, Skin,   Genitourinary, Musculoskeletal, HENT, Endocrine, Cardiovascular, Eyes,   Respiratory, Psychiatric, Allergic/Imm, Heme/Lymph    Last edited by Justin Dodson, OD on 1/24/2017  8:03 AM. (History)        Assessment /Plan     For exam results, see Encounter Report.    Type 2 diabetes mellitus without retinopathy - Both Eyes    Nuclear sclerosis, bilateral    Myopia with astigmatism and presbyopia, bilateral      1. No diabetic retinopathy, no csme. Return in 1 year for dilated eye exam.  2. Educated pt on presence of cataracts and effects on vision. No surgery at this time. Recheck in one year.  3. Copy of Spec Rx given. Different lens options  discussed with patient. RTC 1 year full exam.

## 2017-01-26 ENCOUNTER — OFFICE VISIT (OUTPATIENT)
Dept: PSYCHIATRY | Facility: CLINIC | Age: 52
End: 2017-01-26
Payer: MEDICARE

## 2017-01-26 DIAGNOSIS — F43.22 ADJUSTMENT DISORDER WITH ANXIETY: Primary | ICD-10-CM

## 2017-01-26 PROCEDURE — 90834 PSYTX W PT 45 MINUTES: CPT | Mod: S$PBB,,, | Performed by: PSYCHOLOGIST

## 2017-01-26 PROCEDURE — 90834 PSYTX W PT 45 MINUTES: CPT | Mod: PBBFAC | Performed by: PSYCHOLOGIST

## 2017-01-26 NOTE — PROGRESS NOTES
"Individual Psychotherapy (PhD/LCSW)    1/26/2017    Site:  [facility]         Therapeutic Intervention: Met with patient.  Outpatient - Supportive psychotherapy 45 min - CPT Code 70205    Chief complaint/reason for encounter: Emotional eating     Interval history and content of current session: Ms. Shearer attends her first counseling session today post-bariatric evaluation. She reports that the past several weeks since our meeting have been "stressful". Her father travelled with her sister to [place] to visit her brother and ended up with internal bleeding and a one-week ICU stay there. She felt highly "uneasy" from the moment he left the house, as she has been so used to his company, and then became very anxious once she learned that he was in the hospital. Since he returned last week, he has been very weak and pants a lot. His follow up appointment in [place] is not until 2/10, so she is feeling very worried about him and is reticent to leave him alone. Pt has noticed that her eating has worsened in the past two weeks since this was all going on; she has been snacking a lot to calm herself down on food that she ostensibly buys for her father (i.e. Chocolate, chips) and she cancelled her dietician visit since she felt "almost comatose" with helplessness while her father was away and in the hospital. Pt also explained more about her decision to break up with her girlfriend of 10 years; though her desire to spend all of her time with her father played a role, she also states that her girlfriend has started speaking to other women toward the end of their relationship and lied about it, causing her to feel uneasy and lack trust.     Intervention: Pt states that her main goal is to keep herself healthy so that she can continue caring for her father, and she is worried that her eating will get so out of control that she will die before he does. Began working on anxiety-reducing techniques. Also worked " behaviorally to discuss how she might remove snack food from the home and instead work with her father on taking him out every few days for some type of treat. Pt would like to continue coming to therapy to talk out some of her worries about her father and to work on improved coping skills.    Treatment plan:  · Target symptoms: emotional eating  · Why chosen therapy is appropriate versus another modality: relevant to diagnosis, patient responds to this modality  · Outcome monitoring methods: self-report, observation  · Therapeutic intervention type: behavior modifying psychotherapy, supportive psychotherapy    Risk parameters:  Patient reports no suicidal ideation  Patient reports no homicidal ideation  Patient reports no self-injurious behavior  Patient reports no violent behavior    Verbal deficits: None    Patient's response to intervention:  The patient's response to intervention is accepting.    Progress toward goals and other mental status changes:  The patient's progress toward goals is fair .    Diagnosis:   Adjustment Disorder w/ Anxious mood    Plan:  individual psychotherapy    Return to clinic: 3 weeks    Length of Service (minutes): 45

## 2017-01-28 LAB
6MAM UR QL: NOT DETECTED
7AMINOCLONAZEPAM UR QL: NOT DETECTED
A-OH ALPRAZ UR QL: NOT DETECTED
ALPRAZ UR QL: NOT DETECTED
AMPHET UR QL SCN: NOT DETECTED
ANNOTATION COMMENT IMP: NORMAL
ANNOTATION COMMENT IMP: NORMAL
BARBITURATES UR QL: NOT DETECTED
BUPRENORPHINE UR QL: NOT DETECTED
BZE UR QL: NOT DETECTED
CARBOXYTHC UR QL: NOT DETECTED
CARISOPRODOL UR QL: NOT DETECTED
CLONAZEPAM UR QL: NOT DETECTED
CODEINE UR QL: NOT DETECTED
CREAT UR-MCNC: 125.3 MG/DL (ref 20–400)
DIAZEPAM UR QL: NOT DETECTED
ETHYL GLUCURONIDE UR QL: NOT DETECTED
FENTANYL UR QL: NOT DETECTED
HYDROCODONE UR QL: NOT DETECTED
HYDROMORPHONE UR QL: NOT DETECTED
LORAZEPAM UR QL: NOT DETECTED
MDA UR QL: NOT DETECTED
MDEA UR QL: NOT DETECTED
MDMA UR QL: NOT DETECTED
MEPERIDINE UR QL: NOT DETECTED
METHADONE UR QL: NOT DETECTED
METHAMPHET UR QL: NOT DETECTED
MIDAZOLAM UR QL SCN: NOT DETECTED
MORPHINE UR QL: NOT DETECTED
NORBUPRENORPHINE UR QL CFM: NOT DETECTED
NORDIAZEPAM UR QL: NOT DETECTED
NORFENTANYL UR QL: NOT DETECTED
NORHYDROCODONE UR QL CFM: NOT DETECTED
NOROXYCODONE UR QL CFM: PRESENT
OXAZEPAM UR QL: NOT DETECTED
OXYCODONE UR QL: PRESENT
OXYMORPHONE UR QL: NOT DETECTED
OXYMORPHONE UR QL: NOT DETECTED
PATHOLOGY STUDY: NORMAL
PCP UR QL: NOT DETECTED
PHENTERMINE UR QL: NOT DETECTED
PPAA UR QL: NOT DETECTED
PROPOXYPH UR QL: NOT DETECTED
SERVICE CMNT-IMP: NORMAL
TAPENTADOL UR QL SCN: NOT DETECTED
TAPENTADOL UR QL SCN: NOT DETECTED
TEMAZEPAM UR QL: NOT DETECTED
TRAMADOL UR QL: NOT DETECTED
ZOLPIDEM UR QL: NOT DETECTED

## 2017-02-15 ENCOUNTER — OFFICE VISIT (OUTPATIENT)
Dept: PAIN MEDICINE | Facility: CLINIC | Age: 52
End: 2017-02-15
Payer: MEDICARE

## 2017-02-15 ENCOUNTER — OFFICE VISIT (OUTPATIENT)
Dept: PSYCHIATRY | Facility: CLINIC | Age: 52
End: 2017-02-15
Payer: MEDICARE

## 2017-02-15 VITALS
WEIGHT: 293 LBS | BODY MASS INDEX: 48.82 KG/M2 | SYSTOLIC BLOOD PRESSURE: 134 MMHG | HEIGHT: 65 IN | HEART RATE: 98 BPM | DIASTOLIC BLOOD PRESSURE: 79 MMHG | TEMPERATURE: 98 F

## 2017-02-15 DIAGNOSIS — Z79.891 ENCOUNTER FOR LONG-TERM OPIATE ANALGESIC USE: ICD-10-CM

## 2017-02-15 DIAGNOSIS — M25.562 CHRONIC PAIN OF BOTH KNEES: ICD-10-CM

## 2017-02-15 DIAGNOSIS — M25.561 CHRONIC PAIN OF BOTH KNEES: ICD-10-CM

## 2017-02-15 DIAGNOSIS — G89.29 CHRONIC PAIN OF BOTH KNEES: ICD-10-CM

## 2017-02-15 DIAGNOSIS — F43.22 ADJUSTMENT DISORDER WITH ANXIETY: Primary | ICD-10-CM

## 2017-02-15 DIAGNOSIS — T84.018D FAILED TOTAL KNEE ARTHROPLASTY, SUBSEQUENT ENCOUNTER: ICD-10-CM

## 2017-02-15 DIAGNOSIS — Z96.659 FAILED TOTAL KNEE ARTHROPLASTY, SUBSEQUENT ENCOUNTER: ICD-10-CM

## 2017-02-15 DIAGNOSIS — M47.816 FACET ARTHRITIS OF LUMBAR REGION: Primary | ICD-10-CM

## 2017-02-15 DIAGNOSIS — G89.4 CHRONIC PAIN DISORDER: ICD-10-CM

## 2017-02-15 PROCEDURE — 99214 OFFICE O/P EST MOD 30 MIN: CPT | Mod: S$PBB,,, | Performed by: NURSE PRACTITIONER

## 2017-02-15 PROCEDURE — 90834 PSYTX W PT 45 MINUTES: CPT | Mod: S$PBB,,, | Performed by: PSYCHOLOGIST

## 2017-02-15 PROCEDURE — 99999 PR PBB SHADOW E&M-EST. PATIENT-LVL III: CPT | Mod: PBBFAC,,, | Performed by: NURSE PRACTITIONER

## 2017-02-15 PROCEDURE — 90834 PSYTX W PT 45 MINUTES: CPT | Mod: PBBFAC | Performed by: PSYCHOLOGIST

## 2017-02-15 RX ORDER — OXYCODONE AND ACETAMINOPHEN 10; 325 MG/1; MG/1
1 TABLET ORAL EVERY 6 HOURS PRN
Qty: 120 TABLET | Refills: 0 | Status: SHIPPED | OUTPATIENT
Start: 2017-02-15 | End: 2017-03-14 | Stop reason: SDUPTHER

## 2017-02-15 NOTE — PROGRESS NOTES
Subjective:      Patient ID: Alivia Thomas is a 51 y.o. female.    Chief Complaint: Low-back Pain    Referred by: Referral, Self     Interval History 2/15/2017:  The patient returns today for follow up and medication refill.  She is still planning on having weight loss surgery in the future.  She admits that she has not been as active as previously.  She has a follow up with Dr. Swan scheduled next month.  She continues to take Percocet with benefit.  Her pain today is 8/10.      Interval History 1/18/2017:  The patient returns for follow up and medication refill.  She reports no major changes in her back and knee pain since her last visit.  She has had a lot going on with health issues of family members.  She takes care of her father and her brother, who were both recently hospitalized.  She still plans on having weight loss surgery in the future.  Her pain today is.  She is taking Percocet with benefit and without side effects at this time.    Interval History 12/15/2016:  The patient returns today for follow up of lower back and bilateral knee pain.  She continues to report relief from cooled lumbar RFAs in October.  She feels as though the colder weather is causing increased knee pain.  Since her last visit, she has decided to have weight loss surgery.  She was evaluated by bariatrics and is undergoing pre-op workup at this time.  Her pain today is 8/10.  She continue to take Percocet with significant benefit.      Interval History 11/3/2016:  The patient returns today for follow up of back pain.  She is s/p left then right L2,3,4,5 cooled RFA completed on 10/19/16 with 80% pain relief.  She is very satisfied with these results.  She continues to take Percocet with relief.  She has started kick boxing classes and continues to lose weight.  She has noticeable weight loss since her last visit and is very happy about this.  Her pain today is 8/10.    Interval History 9/16/2016:  The patient returns today  with complaints of lower back and knee pain.  Her worst pain is in her lower back without radiation.  She has lost weight since her last weight.  She has increased her exercise which is helping.  She continues with her diet plan.  She is having the pool at her home fixed and plans to use this for exercise, as she has benefited from frequent pool therapy at [facility].  She previously had significant relief with lumbar RFAs in April for about 4 months.  She would like to repeat the procedures.  She continues to take Percocet as needed which provides her relief.  Her pain today is 8/10.  The patient denies any bowel or bladder incontinence or signs of saddle paresthesia.      Interval History 8/19/2016:  The patient returns today for follow up and medication refill.  She complains of back and knee pain.  Her back pain does not radiate.  She did have relief with RFA in April but feels the pain is returning.  Her previous UTI has resolved.  She has been unable to return to her aquatic therapy because she is caring for her father.  She plans to start walking in the morning before her father wakes up.  She has gained a few pounds since her last visit and is upset about this, as she was previously losing at each visit.  She is also trying to control her diet.  She continues to take Percocet with significant pain relief.  Her pain today is 8/10.  The patient denies any bowel or bladder incontinence or signs of saddle paresthesia.  The patient denies any major medical changes since last office visit.    Interval History 7/19/2016:  The patient returns today for follow up.  She has a history of lower back and bilateral knee pain.  Since her last visit, she reports being diagnosed with a UTI and is currently on antibiotics. She has still been unable to return to aquatherapy.  She has been performing a home exercise routine.  She has lost 6 lbs since her visit last month.  She is taking Percocet as needed for pain.  She  reports efficacy without adverse effects.  Her pain today is 7/10.      Interval History 6/20/2016:  Patient returns for follow up and medication refill.  She has a history of lower back and bilateral knee pain.  Since her last encounter, she reports that she has been suffering with an upper respiratory infection.  She saw Dr. Richardson last week and was started on oral Augmentin and antibiotic eye drops.  She reports that whenever she is sick, she suffers with swelling and drainage to her left eye.  She states that her symptoms have improved since starting the eye drops.  She has been unable to participate in her daily pool therapy since she has been sick but is anxious to resume once she is feeling better.  She did have recent labwork which showed an improving A1C with cholesterol and triglycerides WNL.  She is proud of herself about this, as she reports be very good with her diet recently.  Her pain today is an 8/10.    Interval History 5/5/2016:  Patient returns today for procedure follow up.  She is s/p left then right L2,3,4,5 RFA completed on 4/20/16 with 70% pain relief so far.  She reports lower back and bilateral knee pain.  She has had genicular nerve blocks in the past which provided significant relief for her left knee pain and limited relief of her right knee pain.  She is currently doing physical therapy per self.  She is doing 45 minutes of pool therapy five days per week.  She thinks that this is helping with her pain and mobility.  She is trying to lose weight because she is aware that this will help with her pain.  She is taking percocet as needed which provided relief without side effects.  Her pain today is a 5/10.      Interval History: 3/28/2016:  Patient returns today for follow up of lower back and bilateral knee pain.  She is s/p Bilateral L2,3,4,5 MBB on 2/16/16 with 80% relief for 5 days and bilateral L2,3,4,5 MBB on 3/1/16 with 70% pain relief for 1 day.  She is requesting to schedule  the RFAs.  The worst of her pain is located to her left lower back and does not radiate. She is still complaining of bilateral knee pain which is worse with walking and activity.  Her pain today is a 9/10.  The patient denies any bowel/bladder incontinence or symptoms of saddle paresthesia.  The patient denies any major medical changes since last OV. She is currently taking Percocet which helps her pain without any adverse effects.     Interval History: 12/17/2015:  Patient presents in clinic for follow up for lower back, bilateral knee, and right arm pain. Her pain is 9/10 today. She underwent carpal tunnel revision 12/14/15 in the right wrist. She is currently out of her Percocet 10-325mg prescription.   Cont to have significant low back pain, wh will also ich she reports is her worst current pain.  Based on previous imaging we know that the patient has significant facet arthropathy in the lumbar spine at the levels of L3-4 and L4-5 L5-S1.  She describes dull achy with occasional sharp pain rates as 7/10.     Interval history 10/26/2015:  Patient returns to clinic for follow up previously seen for knee pain and low back pain. She reports pain is improved with Percocet 10/325 BID. She is no longer taking Lyrica and recently started Topamax. She continues to take Celebrex once per day and does help. She also continues to use a topical cream PRN and does help some. She has completed therapy since last visit but she is continuing to exercise regularly. Her pain in back and knees is unchanged in quality and distribution from previous visits. She otherwise denies any new issues at this time.     Interval history 9/21/2015:  Since previous encounter the patient comes in after having started using gabapentin and developing swelling in her legs.  She states that it did make a difference for her pain although she discontinued it secondary to swelling after a decrease in her dosing did not alleviate this.  She followed up  with her orthopedist and did have x-rays performed which did not show any significant change compared to previous.  She is scheduled for a four-month follow-up.  She has not yet begun exercising but will begin soon she is scheduled for pool-based therapy.  The opioid medications have been helping her and her pain twice a day.  Further decrease to once a day prevented her from being able to function.  She does continue to take Celebrex without adverse reaction.  Additionally the patient stated that she has been having lower back pain which is new.    Interval History 08-: Since previous visit patient comes in today to discuss her medications.  Patient states she is having pain in the lower back and both knee, sharp , throbbing , burning, and stabbing pain, she rates it 8/10.  Patient is taking percocet 10/325mg, we have been weaning her from this medication last prescription was provided for 30 tablets.  Additionally the patient is taking Celebrex with regularity with some improvement in her pain.  She has completed physical therapy status post knee replacement her knee hardware appears appropriate she has a scheduled appointment to follow-up with her orthopedic surgeon in one week to discuss using a extension brace while she is at home laying in bed.  She continues to swim twice a week and is actively trying to lose weight and has been dieting.    Interval History 06/19/2015:  Patient presents in clinic for two month follow up. She reports her bilateral knee pain and low back pain is an 8/10. She currently takes celebrex and Percocet for pain and uses a topical cream.  She was recently evaluated by her orthopedist and the hardware all appears to be appropriately placed and the next follow-up is in 6 weeks.  The patient continues to work on weight loss and exercise and states that she has been doing more than in the past.   Patient reports no other health changes since previous encounter.    Interval History  04/09/2015:  Patient presents in clinic for one month follow up. She reports bilateral knee pain and low back pain is a 9/10 today. She currently takes percocet for pain as needed and uses a cane for ambulation. She states that the low back pain is new.  She continues to have bilateral knee pain and is in physical therapy.  She continues to take Celebrex daily and uses a topical pain cream regularly.    Patient reports no other health changes since previous encounter.    Interval history 3/5/2015:  Since previous encounter patient is status post right total knee replacement on 11/4/2014 and has been healed with postoperative visits showing good progress.  The patient does have continued physical therapy sessions and has been attempting to lose weight and has lost approximately 25 pounds although her BMI continues to be 54.  She has been making good efforts to try and continue to increase her range of motion and lose weight.  She continues to have significant pain in bilateral knees and continues to use a cane for ambulation.  She was receiving oxycodone/acetaminophen 10/325 every 8 hours by mouth when necessary and requiring in order to persist in her physical therapy although the topical pain cream that she has been applying has been helping her to a limited degree she still requires the medication regularly.  Her recent x-ray imaging shows good positioning of the prosthesis.  No other health changes since previous encounter.     Interval history 2/17/2014:  Patient reports that she has been using the topical compounded cream on the knee approximately 4 times per day and she states that it does help her with her pain that she is experiencing.  She states that she has not gone to formal physical therapy but that she has been going to a pool that has exercise classes which is free for her and that she feels like it is helping her continue to lose weight.  Additionally she continues using hydrocodone/acetaminophen  7.5/750 approximately 3 times per day when necessary and states that also helps with her pain symptoms.  He she's still talks to have replacement for the left knee, but would like to lose weight further before going to that.  She has also had previous injections into her knees which have offered little to no relief in her pain symptoms.  She has had no other health changes since previous encounter.    Previous encounter 1/21/2014:  HPI Comments: 47 yo female presents for initial evaluation of bilateral knee pain, L>R. She is s/p arthroscopic right knee surgery and eventual replacement and then revision surgeries (Dr. Swan, Orthopedics). The pain is present in both knees and is described as a terrible ache. She hears occasional popping in both knees with movement. The pain is worse with being on her feet and getting up from a sitting to standing position. Denies lower ext weakness or paresthesias. She does not have back pain or pain radiating down her legs. The pain is better with Celebrex and rest. She also takes Vicodin ES TID but this makes her very sleepy. She is not sure it helps with the pain because she usually falls asleep.     Physical Therapy: not since 2011 just prior to her 3rd right knee surgery (revision after replacement); has tried swimming which helps with weight loss    Non-pharmacologic Treatment: none    Pain Medications: Percocet and celebrex    Blood thinners: ASA 81 mg daily     Interventional Therapies: steroid inj and visco-supplementation (series of 3) in left knee- not helpful  3/31/14 Bilateral genicular nerve block- significant relief of left knee, limited relief of right knee pain  2/16/16 Bilateral L2,3,4,5 MBB  3/1/16 Bilateral L2,3,4,5 MBB   4/6/16 Left L2,3,4,5 RFA- significant relief  4/20/16 Right L2,3,4,5 RFA- significant relief  10/5/16 Left L2,3,4,5 cooled RFA  10/19/16 Right L2,3,4,5 cooled RFA      Relevant Surgeries: right knee surgery x3 (arthroscopic, then replacement  and subsequent revision surgery), s/p left total knee arthroplasty    Relevant Imaging:  Xray Lumbar spine 09/21/2015  Lumbar spine radiograph    Comparison: None    Results: AP, lateral neutral, lateral flexion , lateral extension, bilateral oblique and spot views. The alignment of the lumbar spine demonstrates a mild levoscoliosis . 11-mm anterior listhesis of L4 relative to L5 with no translational abnormalities  seen on flexion and extension views. The vertebral body heights are well-maintained , mild disk space narrowing L4-L5 and L5-S1. Mild anterior and marginal osteophyte formation seen throughout the lumbar spine . The oblique views demonstrate no   definite spondylolysis. There is facet joint osseous hypertrophy noted at L3-L4 and L4-L5.      Impression         The Significant spondylosis of the lumbar spine with grade 1 anterior listhesis L4 relative to L5.  Facet joint osseous hypertrophy L3-L4 and L4-5.      Electronically signed by: BLESSING ROE MD  Date: 09/21/15  Time: 09:56           knee x-rays (see below) x-ray knee bilateral 8/26/2015:  Standing AP knees, lateral view of both knees and sunrise view of both patella. Study compared to May 2015. Postop change of bilateral knee replacement. The prosthetic components are in satisfactory position. Erosive changes involving the patella   again evident bilaterally.    Impression no significant change.      Xray Bilateral Knee 05/25/2015:  There are bilateral total knee arthroplasties with posterior resurfacing of the patella. As observed on 12/17/2014 there is a fracture of the left patella in the parasagittal plane.    Heterotopic bone is evident about each knee.    I detect no dislocation, unusual radiopaque retained foreign body, lytic or blastic lesion, or chondrocalcinosis.    Lab Results   Component Value Date    HGBA1C 10.6 (H) 12/15/2016     Lab Results   Component Value Date    CHOL 200 (H) 12/15/2016    CHOL 153 06/17/2016    CHOL 157  04/01/2015     Lab Results   Component Value Date    HDL 40 12/15/2016    HDL 44 06/17/2016    HDL 44 04/01/2015     Lab Results   Component Value Date    LDLCALC 141.0 12/15/2016    LDLCALC 92.6 06/17/2016    LDLCALC 97.2 04/01/2015     Lab Results   Component Value Date    TRIG 95 12/15/2016    TRIG 82 06/17/2016    TRIG 79 04/01/2015     Lab Results   Component Value Date    CHOLHDL 20.0 12/15/2016    CHOLHDL 28.8 06/17/2016    CHOLHDL 28.0 04/01/2015         Past Medical History   Diagnosis Date    Asthma     Diabetes mellitus type II     DJD (degenerative joint disease) of knee 6/19/2014    Hyperlipidemia     Morbid obesity     Sleep apnea      Past Surgical History   Procedure Laterality Date    Carpal tunnel release      Carpal tunnel release  1980s     left    Carpal tunnel release  2012     right    Knee surgery  3/2010     orthroscope    Knee surgery  6-19-14     left TKR    Joint replacement Bilateral      with 2 revisions on rt     Family History   Problem Relation Age of Onset    Diabetes Mother     Hypertension Mother     Cataracts Mother     Diabetes Father     Cataracts Father     Coronary artery disease Brother     Amblyopia Neg Hx     Blindness Neg Hx     Cancer Neg Hx     Glaucoma Neg Hx     Macular degeneration Neg Hx     Retinal detachment Neg Hx     Strabismus Neg Hx     Stroke Neg Hx     Thyroid disease Neg Hx      Social History     Social History    Marital status: Single     Spouse name: N/A    Number of children: N/A    Years of education: N/A     Occupational History    Not on file.     Social History Main Topics    Smoking status: Never Smoker    Smokeless tobacco: Never Used    Alcohol use Yes      Comment: occasionally     Drug use: No    Sexual activity: Yes     Partners: Female     Birth control/ protection: None     Other Topics Concern    Not on file     Social History Narrative    Disabled. The patient is the youngest of 6 siblings. Single.  Lives with single-sex partner.                      Review of patient's allergies indicates:  No Known Allergies    Current Outpatient Prescriptions:     albuterol 90 mcg/actuation inhaler, Take 2 puffs every 4-6 hours  as needed for shortness of breath/wheezing, Disp: 1 Inhaler, Rfl: 6    aspirin (ECOTRIN) 81 MG EC tablet, Take 1 tablet by mouth every morning. prevents heart attacks and strokes, Disp: , Rfl:     atorvastatin (LIPITOR) 20 MG tablet, Take 1 tablet (20 mg total) by mouth once daily., Disp: 30 tablet, Rfl: 6    blood sugar diagnostic Strp, Freestyle light strips and lancets, Disp: 100 each, Rfl: 6    celecoxib (CELEBREX) 200 MG capsule, One capsule daily, Disp: 30 capsule, Rfl: 2    econazole nitrate 1 % cream, Apply topically 2 (two) times daily., Disp: 30 g, Rfl: 2    ergocalciferol (ERGOCALCIFEROL) 50,000 unit Cap, Take 1 capsule (50,000 Units total) by mouth twice a week., Disp: 24 capsule, Rfl: 0    fluconazole (DIFLUCAN) 150 MG Tab, One daily for 3 days, Disp: 3 tablet, Rfl: 3    insulin detemir (LEVEMIR FLEXPEN) 100 unit/mL (3 mL) SubQ InPn pen, Inject 20 Units into the skin every evening., Disp: 1 Box, Rfl: 11    insulin lispro (HUMALOG) 100 unit/mL injection, Inject 11 Units into the skin 3 (three) times daily before meals. Plus Sliding scale, Disp: 3 mL, Rfl: 11    lisinopril (PRINIVIL,ZESTRIL) 2.5 MG tablet, One daily for proteinuria., Disp: 30 tablet, Rfl: 6    metformin (GLUCOPHAGE-XR) 500 MG 24 hr tablet, Take 2 tablets (1,000 mg total) by mouth 2 (two) times daily., Disp: 360 tablet, Rfl: 6    omeprazole (PRILOSEC) 20 MG capsule, Take 1 capsule (20 mg total) by mouth every morning., Disp: 30 capsule, Rfl: 6    oxycodone-acetaminophen (PERCOCET)  mg per tablet, Take 1 tablet by mouth every 6 (six) hours as needed for Pain., Disp: 120 tablet, Rfl: 0    vitamin D 185 MG Tab, Take 5,000 mg by mouth once daily., Disp: , Rfl:     REVIEW OF SYSTEMS:    GENERAL:  Patient is  "actively trying to lose weight.  RESPIRATORY:  Negative for cough, wheezing or shortness of breath, patient denies any recent URI.  CARDIOVASCULAR:  Negative for chest pain, leg swelling or palpitations.  GI:  Negative for abdominal discomfort, blood in stools or black stools or change in bowel habits, occasional constipation.  MUSCULOSKELETAL:  See HPI.  SKIN:  Negative for lesions, rash, and itching.  PSYCH:  No mood disorder or recent psychosocial stressors.  Patient's sleep is disturbed secondary to pain (patient reports also that she has insomnia).  HEMATOLOGY/LYMPHOLOGY:  Negative for prolonged bleeding, bruising easily or swollen nodes.  81 mg aspirin  ENDO: Patient has a history of diabetes  NEURO:   No history of headaches, syncope, paralysis, seizures or tremors.  All other reviewed and negative other than HPI.    OBJECTIVE:    Visit Vitals    /79    Pulse 98    Temp 98 °F (36.7 °C) (Oral)    Ht 5' 5" (1.651 m)    Wt (!) 165.1 kg (363 lb 15.7 oz)    BMI 60.57 kg/m2       PHYSICAL EXAMINATION:    GENERAL: Well appearing, in no acute distress, alert and oriented x3.   PSYCH:  Mood and affect appropriate.  SKIN: Skin color, texture, turgor normal, no rashes or lesions.  HEAD/FACE:  Normocephalic, atraumatic.   CV: RRR with palpation of the radial artery.  PULM: No evidence of respiratory difficulty, symmetric chest rise.  BACK: No pain to palpation over the lumbar facet joints.  Limited lumbar flexion and extension with pain.  Positive bilateral facet loading.  Negative SLR bilaterally.  Pain with palpation to bilateral SI joints.  JENNA is negative bilaterally.  EXTREMITIES:  Well-healed midline scars to bilateral knees.  Painful extension and flexion of bilateral knees.  Medial and lateral joint line tenderness to bilateral knees.    MUSCULOSKELETAL: Bilateral upper and lower extremity strength is normal and symmetric.  No atrophy or tone abnormalities are noted.  NEURO: Bilateral lower " extremity coordination and muscle stretch reflexes are physiologic and symmetric.  Plantar response are downgoing. No clonus.  No loss of sensation is noted.  GAIT: Antalgic- ambulating without assistance.       Assessment:       Encounter Diagnoses   Name Primary?    Facet arthritis of lumbar region Yes    Failed total knee arthroplasty, subsequent encounter     Chronic pain of both knees     Chronic pain disorder     Encounter for long-term opiate analgesic use          Plan:       - Previous imaging was reviewed and discussed with the patient today.     - Continue to f/u with bariatrics for possible gastric sleeve surgery.    - Can repeat cooled RFA after April 2017.  We discussed this today.    - Continue oxycodone-acetaminophen 10/325 one tablet every 6 hours PRN pain.  This will be e-prescribed by Dr. Wagner.    - The [place] Board of Pharmacy website for prescription monitoring was consulted today, and it does not suggest any deviations in conflict with the patient's controlled substance contract with our clinic. Will continue current therapy with frequent monitoring of the controlled substance database, and urine drug screens on followup.     - UDS from 1/18/17 was reviewed and is compliant.      - Continue topical pain cream PRN.    - RTC in 1 month or sooner if needed.    - Dr. Wagner was consulted on the patient and agrees with this plan.      The above plan and management options were discussed at length with patient. Patient is in agreement with the above and verbalized understanding.     EMILY Asencio  02/15/2017

## 2017-02-15 NOTE — MR AVS SNAPSHOT
[facility]  [address]  [place] [address]  Phone: [phone]  Fax: [phone]                  Alivia Thomas   2/15/2017 8:00 AM   Office Visit    Description:  Female : 1965   Provider:  EMILY Huggins   Department:  [facility]           Reason for Visit     Low-back Pain           Diagnoses this Visit        Comments    Facet arthritis of lumbar region    -  Primary     Failed total knee arthroplasty, subsequent encounter         Chronic pain of both knees         Chronic pain disorder         Encounter for long-term opiate analgesic use                To Do List           Future Appointments        Provider Department Dept Phone    3/14/2017 8:00 AM EMILY Huggins [facility] [phone]    3/27/2017 8:00 AM [facility]ASHAWN [facility] [phone]    3/27/2017 9:15 AM Theodore Swan MD [facility] - Orthopedics [phone]      Goals (5 Years of Data)     None      [facility]     [facility] -  Assistance  Registered nurses in the [facility] Center provide clinical advisement, health education, appointment booking, and other advisory services.  Call for this free service at [phone].             Medications           Message regarding Medications     Verify the changes and/or additions to your medication regime listed below are the same as discussed with your clinician today.  If any of these changes or additions are incorrect, please notify your healthcare provider.             Verify that the below list of medications is an accurate representation of the medications you are currently taking.  If none reported, the list may be blank. If incorrect, please contact your healthcare provider. Carry this list with you in case of emergency.           Current Medications     albuterol 90 mcg/actuation inhaler Take 2 puffs every 4-6 hours  as needed for shortness of  "breath/wheezing    aspirin (ECOTRIN) 81 MG EC tablet Take 1 tablet by mouth every morning. prevents heart attacks and strokes    atorvastatin (LIPITOR) 20 MG tablet Take 1 tablet (20 mg total) by mouth once daily.    blood sugar diagnostic Strp Freestyle light strips and lancets    celecoxib (CELEBREX) 200 MG capsule One capsule daily    econazole nitrate 1 % cream Apply topically 2 (two) times daily.    ergocalciferol (ERGOCALCIFEROL) 50,000 unit Cap Take 1 capsule (50,000 Units total) by mouth twice a week.    fluconazole (DIFLUCAN) 150 MG Tab One daily for 3 days    insulin detemir (LEVEMIR FLEXPEN) 100 unit/mL (3 mL) SubQ InPn pen Inject 20 Units into the skin every evening.    insulin lispro (HUMALOG) 100 unit/mL injection Inject 11 Units into the skin 3 (three) times daily before meals. Plus Sliding scale    lisinopril (PRINIVIL,ZESTRIL) 2.5 MG tablet One daily for proteinuria.    metformin (GLUCOPHAGE-XR) 500 MG 24 hr tablet Take 2 tablets (1,000 mg total) by mouth 2 (two) times daily.    omeprazole (PRILOSEC) 20 MG capsule Take 1 capsule (20 mg total) by mouth every morning.    oxycodone-acetaminophen (PERCOCET)  mg per tablet Take 1 tablet by mouth every 6 (six) hours as needed for Pain.    vitamin D 185 MG Tab Take 5,000 mg by mouth once daily.           Clinical Reference Information           Your Vitals Were     BP Pulse Temp Height Weight BMI    134/79 98 98 °F (36.7 °C) (Oral) 5' 5" (1.651 m) 165.1 kg (363 lb 15.7 oz) 60.57 kg/m2      Blood Pressure          Most Recent Value    BP  134/79      Allergies as of 2/15/2017     No Known Allergies      Immunizations Administered on Date of Encounter - 2/15/2017     None      [facility] Sign-Up     Activating your [facility] account is as easy as 1-2-3!     1) Visit [email or web address], select Sign Up Now, enter this activation code and your date of birth, then select Next.  [email or web address]  Expires: 4/1/2017  8:36 AM      2) Create a username and password " to use when you visit [facility] in the future and select a security question in case you lose your password and select Next.    3) Enter your e-mail address and click Sign Up!    Additional Information  If you have questions, please e-mail [email or web address] or call [phone] to talk to our [facility] staff. Remember, [facility] is NOT to be used for urgent needs. For medical emergencies, dial 911.         Language Assistance Services     ATTENTION: Language assistance services are available, free of charge. Please call [phone].      ATENCIÓN: Si habla español, tiene a abad disposición servicios gratuitos de asistencia lingüística. Llame al [phone].     CHÚ Ý: N?u b?n nói Ti?ng Vi?t, có các d?ch v? h? tr? ngôn ng? mi?n phí dành cho b?n. G?i s? [phone].         [facility] complies with applicable Federal civil rights laws and does not discriminate on the basis of race, color, national origin, age, disability, or sex.

## 2017-02-15 NOTE — PROGRESS NOTES
"Individual Psychotherapy (PhD/LCSW)    2/15/2017    Site:  [facility]         Therapeutic Intervention: Met with patient.  Outpatient - Supportive psychotherapy 45 min - CPT Code 84605    Chief complaint/reason for encounter: Emotional eating     Interval history and content of current session: Ms. Shearer reports that she has been improving in her self-care around eating and exercise since our last appointment. She states that she has cut out sweets and starches and that she is making her own food, letting her father make whatever he wants to eat. She states that her niece and sister staged an "intervention" with her about her staying at home with her father too much and neglecting herself, and she agreed that she had to make more time for her health. As an example, today she had an appointment that ended at 9 and one that started at 11, and she used the time to drive to [employer] and walk twice around the track. She has committed to more exercise and activity even though it is painful to her knees. Today pt spent most of the session discussing her relationship with her ex-girlfriend. She provided a lot of the history of the relationship, and the anger from both of them that led to its dissolution. She described the ways that she worked to move on from the anger, mostly relying on her sharan, and how they are trying to move forward in a friendship even though her ex has a new girlfriend. Pt also discussed how she is appreciating counseling at this time, as she realizes that she does not want to talk to her family about her feelings right now, and is focusing on being strong for her father, yet she realizes the importance of self-reflection and insight and is aware of tendencies toward self-deception she has had in the past that she hopes therapy will help her notice if necessary in the future.    Treatment plan:  · Target symptoms: emotional eating  · Why chosen therapy is appropriate versus another modality: " relevant to diagnosis, patient responds to this modality  · Outcome monitoring methods: self-report, observation  · Therapeutic intervention type: behavior modifying psychotherapy, supportive psychotherapy    Risk parameters:  Patient reports no suicidal ideation  Patient reports no homicidal ideation  Patient reports no self-injurious behavior  Patient reports no violent behavior    Verbal deficits: None    Patient's response to intervention:  The patient's response to intervention is accepting.    Progress toward goals and other mental status changes:  The patient's progress toward goals is fair .    Diagnosis:   Adjustment Disorder w/ Anxious mood    Plan:  individual psychotherapy    Return to clinic: 3 weeks    Length of Service (minutes): 45

## 2017-03-04 ENCOUNTER — HOSPITAL ENCOUNTER (EMERGENCY)
Facility: HOSPITAL | Age: 52
Discharge: HOME | End: 2017-03-04
Attending: EMERGENCY MEDICINE | Admitting: EMERGENCY MEDICINE
Payer: MEDICARE

## 2017-03-04 VITALS
RESPIRATION RATE: 18 BRPM | TEMPERATURE: 99 F | OXYGEN SATURATION: 97 % | DIASTOLIC BLOOD PRESSURE: 84 MMHG | SYSTOLIC BLOOD PRESSURE: 144 MMHG | WEIGHT: 293 LBS | HEIGHT: 66 IN | BODY MASS INDEX: 47.09 KG/M2 | HEART RATE: 88 BPM

## 2017-03-04 DIAGNOSIS — T78.40XA ALLERGIC REACTION, INITIAL ENCOUNTER: Primary | ICD-10-CM

## 2017-03-04 PROCEDURE — 99283 EMERGENCY DEPT VISIT LOW MDM: CPT

## 2017-03-04 PROCEDURE — 25000003 PHARM REV CODE 250: Performed by: EMERGENCY MEDICINE

## 2017-03-04 PROCEDURE — 63600175 PHARM REV CODE 636 W HCPCS: Performed by: EMERGENCY MEDICINE

## 2017-03-04 PROCEDURE — 99283 EMERGENCY DEPT VISIT LOW MDM: CPT | Mod: ,,, | Performed by: EMERGENCY MEDICINE

## 2017-03-04 RX ORDER — FAMOTIDINE 20 MG/1
40 TABLET, FILM COATED ORAL
Status: COMPLETED | OUTPATIENT
Start: 2017-03-04 | End: 2017-03-04

## 2017-03-04 RX ORDER — PREDNISONE 20 MG/1
40 TABLET ORAL DAILY
Qty: 10 TABLET | Refills: 0 | Status: SHIPPED | OUTPATIENT
Start: 2017-03-04 | End: 2017-03-09

## 2017-03-04 RX ORDER — DIPHENHYDRAMINE HCL 50 MG
50 CAPSULE ORAL
Status: COMPLETED | OUTPATIENT
Start: 2017-03-04 | End: 2017-03-04

## 2017-03-04 RX ORDER — PREDNISONE 20 MG/1
40 TABLET ORAL
Status: COMPLETED | OUTPATIENT
Start: 2017-03-04 | End: 2017-03-04

## 2017-03-04 RX ADMIN — FAMOTIDINE 40 MG: 20 TABLET, FILM COATED ORAL at 05:03

## 2017-03-04 RX ADMIN — PREDNISONE 40 MG: 20 TABLET ORAL at 05:03

## 2017-03-04 RX ADMIN — DIPHENHYDRAMINE HYDROCHLORIDE 50 MG: 50 CAPSULE ORAL at 05:03

## 2017-03-04 NOTE — ED PROVIDER NOTES
Encounter Date: 3/4/2017       History     Chief Complaint   Patient presents with    Allergic Reaction     Ate earlier and states she broke out. Pt states she dismissed the symptoms. Woke up PTA and states her throat feels like it is closing in. Pt states this has happened previously while cutting strawberries. No obvious difficulty noted at present.      Review of patient's allergies indicates:  No Known Allergies  HPI Comments: Patient is a 51-year-old female with history of asthma, diabetes, hyperlipidemia presenting with throat swelling.  Patient says she ate dinner of a crawfish and shrimp pastry dish then applied a facial wash prior to going to bed.  When she awoke this morning, she felt itching in her throat with swallowing.  She denies respiratory distress, rash, nausea, vomiting, diarrhea.  She has an allergy to strawberries, and the symptoms are not as severe as her reaction to strawberries. She took albuterol at home but no treatment for allergic reaction yet.    Past Medical History:   Diagnosis Date    Asthma     Diabetes mellitus type II     DJD (degenerative joint disease) of knee 6/19/2014    Hyperlipidemia     Morbid obesity     Sleep apnea      Past Surgical History:   Procedure Laterality Date    CARPAL TUNNEL RELEASE      CARPAL TUNNEL RELEASE  1980s    left    CARPAL TUNNEL RELEASE  2012    right    JOINT REPLACEMENT Bilateral     with 2 revisions on rt    KNEE SURGERY  3/2010    orthroscope    KNEE SURGERY  6-19-14    left TKR     Family History   Problem Relation Age of Onset    Diabetes Mother     Hypertension Mother     Cataracts Mother     Diabetes Father     Cataracts Father     Coronary artery disease Brother     Amblyopia Neg Hx     Blindness Neg Hx     Cancer Neg Hx     Glaucoma Neg Hx     Macular degeneration Neg Hx     Retinal detachment Neg Hx     Strabismus Neg Hx     Stroke Neg Hx     Thyroid disease Neg Hx      Social History   Substance Use Topics     Smoking status: Never Smoker    Smokeless tobacco: Never Used    Alcohol use Yes      Comment: occasionally      Review of Systems   Constitutional: Negative for fever.   HENT: Negative for postnasal drip, sore throat and trouble swallowing.         Throat swelling   Respiratory: Negative for shortness of breath.    Cardiovascular: Negative for chest pain.   Gastrointestinal: Negative for nausea.   Genitourinary: Negative for dysuria.   Musculoskeletal: Negative for back pain.   Skin: Negative for rash.   Neurological: Negative for weakness.   Hematological: Does not bruise/bleed easily.       Physical Exam   Initial Vitals   BP Pulse Resp Temp SpO2   03/04/17 0428 03/04/17 0428 03/04/17 0428 03/04/17 0428 03/04/17 0428   144/84 88 18 98.6 °F (37 °C) 97 %     Physical Exam    Nursing note and vitals reviewed.  Constitutional: She appears well-developed and well-nourished. She is not diaphoretic. No distress.   HENT:   Head: Normocephalic and atraumatic.   Mouth/Throat: Oropharynx is clear and moist. No posterior oropharyngeal edema.   No OP edema. No stridor. No drooling.    Eyes: EOM are normal. Pupils are equal, round, and reactive to light.   Neck: Normal range of motion. Neck supple.   Cardiovascular: Normal rate, regular rhythm, normal heart sounds and intact distal pulses. Exam reveals no gallop and no friction rub.    No murmur heard.  Pulmonary/Chest: Breath sounds normal. No stridor. No respiratory distress. She has no wheezes. She has no rhonchi. She has no rales. She exhibits no tenderness.   No stridor. No respiratory distress.   Abdominal: Soft. Bowel sounds are normal. She exhibits no distension and no mass. There is no tenderness. There is no rebound and no guarding.   Musculoskeletal: Normal range of motion. She exhibits no edema or tenderness.   Neurological: She is alert and oriented to person, place, and time.   Skin: Skin is warm and dry. No rash noted.         ED Course   Procedures  Labs  Reviewed - No data to display                APC / Resident Notes:   Patient is a 51 y.o. Female presenting with itchy sensation with throat swallowing. Patient does not meet criteria for anaphylaxis, and able to swallow without difficulty. No signs of shock. No signs of OP infection or airway compromise. Treated with PO Benadryl, Pepcid, and Prednisone for allergic reaction and will observe.  Jignesh Degroot M.D.      Feels better with treatment. VSS. No respiratory distress. Will prescribe Prednisone and advised to f/u with PCP. Given return precautions.  Jignesh Degroot M.D.         Attending Attestation:   Physician Attestation Statement for Resident:  As the supervising MD   Physician Attestation Statement: I have personally seen and examined this patient.   I agree with the above history. -:   As the supervising MD I agree with the above PE.    As the supervising MD I agree with the above treatment, course, plan, and disposition.   -:     Throat irritation this AM after eating shellfish last night, no known previous allergies except to strawberries. No obvious allergy on exam, no swelling or SOB and tolerating PO easily. Tx as allergic reaction with improvement                    ED Course     Clinical Impression:   The encounter diagnosis was Allergic reaction, initial encounter.          Jignesh Degroot MD  Resident  03/04/17 [id number]       Neptali Victor MD  03/06/17 9113

## 2017-03-04 NOTE — ED AVS SNAPSHOT
[facility]  [address] [facility] [address]               Alivia Marie Cyr   3/4/2017  4:29 AM   ED    Description:  Female : 1965   Department:  [facility]           Your Care was Coordinated By:     Provider Role From To    Neptali Victor MD Attending Provider 17 0533 --    Jignesh Degroot MD Resident 17 [phone] --      Reason for Visit     Allergic Reaction           Diagnoses this Visit        Comments    Allergic reaction, initial encounter    -  Primary       ED Disposition     ED Disposition Condition Comment    Discharge             To Do List           Follow-up Information     Schedule an appointment as soon as possible for a visit with Clarisse Richardson MD.    Specialty:  Internal Medicine    Contact information:    [address]  [place] [address]  [phone]          [facility]    Specialty:  Emergency Medicine    Why:  As needed, If symptoms worsen    Contact information:    [address] [facility] [id number] [phone]       These Medications        Disp Refills Start End    predniSONE (DELTASONE) 20 MG tablet 10 tablet 0 3/4/2017 3/9/2017    Take 2 tablets (40 mg total) by mouth once daily. - Oral    Pharmacy: [facility] [address] Ph #: [phone]         [facility] On Call     [facility] -  Assistance  Registered nurses in the [facility] provide clinical advisement, health education, appointment booking, and other advisory services.  Call for this free service at [phone].             Medications           Message regarding Medications     Verify the changes and/or additions to your medication regime listed below are the same as discussed with your clinician today.  If any of these changes or additions are incorrect, please notify your healthcare  provider.        START taking these NEW medications        Refills    predniSONE (DELTASONE) 20 MG tablet 0    Sig: Take 2 tablets (40 mg total) by mouth once daily.    Class: Print    Route: Oral      These medications were administered today        Dose Freq    diphenhydrAMINE capsule 50 mg 50 mg ED 1 Time    Sig: Take 1 capsule (50 mg total) by mouth ED 1 Time.    Class: Normal    Route: Oral    famotidine tablet 40 mg 40 mg ED 1 Time    Sig: Take 2 tablets (40 mg total) by mouth ED 1 Time.    Class: Normal    Route: Oral    predniSONE tablet 40 mg 40 mg ED 1 Time    Sig: Take 2 tablets (40 mg total) by mouth ED 1 Time.    Class: Normal    Route: Oral           Verify that the below list of medications is an accurate representation of the medications you are currently taking.  If none reported, the list may be blank. If incorrect, please contact your healthcare provider. Carry this list with you in case of emergency.           Current Medications     albuterol 90 mcg/actuation inhaler Take 2 puffs every 4-6 hours  as needed for shortness of breath/wheezing    aspirin (ECOTRIN) 81 MG EC tablet Take 1 tablet by mouth every morning. prevents heart attacks and strokes    atorvastatin (LIPITOR) 20 MG tablet Take 1 tablet (20 mg total) by mouth once daily.    blood sugar diagnostic Strp Freestyle light strips and lancets    celecoxib (CELEBREX) 200 MG capsule One capsule daily    econazole nitrate 1 % cream Apply topically 2 (two) times daily.    ergocalciferol (ERGOCALCIFEROL) 50,000 unit Cap Take 1 capsule (50,000 Units total) by mouth twice a week.    fluconazole (DIFLUCAN) 150 MG Tab One daily for 3 days    insulin detemir (LEVEMIR FLEXPEN) 100 unit/mL (3 mL) SubQ InPn pen Inject 20 Units into the skin every evening.    insulin lispro (HUMALOG) 100 unit/mL injection Inject 11 Units into the skin 3 (three) times daily before meals. Plus Sliding scale    lisinopril (PRINIVIL,ZESTRIL) 2.5 MG tablet One daily for  "proteinuria.    metformin (GLUCOPHAGE-XR) 500 MG 24 hr tablet Take 2 tablets (1,000 mg total) by mouth 2 (two) times daily.    omeprazole (PRILOSEC) 20 MG capsule Take 1 capsule (20 mg total) by mouth every morning.    oxycodone-acetaminophen (PERCOCET)  mg per tablet Take 1 tablet by mouth every 6 (six) hours as needed for Pain.    vitamin D 185 MG Tab Take 5,000 mg by mouth once daily.    predniSONE (DELTASONE) 20 MG tablet Take 2 tablets (40 mg total) by mouth once daily.           Clinical Reference Information           Your Vitals Were     BP Pulse Temp Resp Height Weight    144/84 (BP Location: Right arm, Patient Position: Sitting) 88 98.6 °F (37 °C) (Oral) 18 5' 6" (1.676 m) 163.3 kg (360 lb)    Last Period SpO2 BMI          03/04/2017 (Exact Date) 97% 58.11 kg/m2        Allergies as of 3/4/2017     No Known Allergies      Immunizations Administered on Date of Encounter - 3/4/2017     None      ED Micro, Lab, POCT     None      ED Imaging Orders     None        Discharge Instructions         Generalized Allergic Reaction (Other)  You are having an allergic reaction. Almost anything can cause one. Different people are allergic to different things. It is usually something that you ate or swallowed, came into contact with by getting or putting it on your skin or clothes, or something you breathed in the air. This can be very annoying and sometimes scary.  Most of us think of allergic reactions when we have a rash or itchy skin. Symptoms can include:  · Rash, hives, redness, welts, blisters  · Itching, burning, stinging, pain  · Dry, flaky, cracking, scaly skin  · Swelling of the face, lips or other parts of the body  · Hoarse voice  · Trouble swallowing, feeling like your throat is closing  · Trouble breathing, wheezing  · Nausea, vomiting, diarrhea, stomach cramps  · Feeling faint or lightheaded, rapid heart rate  Sometimes the cause may be obvious. However, there are so many things that can cause a " reaction that you may not be able to figure out. The most important things to help find your allergen are:  · Remembering when it started  · What you were doing at the time or just before that  · Any activities you were involved in  · Any new products or contacts  Here are some common causes, but remember almost anything can cause a reaction, and you may not even be aware that you came into contact with one of these things.  · Dust, mold, pollen  · Plants, such aspoison ivy and poison oak are common ones, but there are many others  · Animals  · Foods such as shrimp, shellfish, peanuts, milk products, gluten, eggs; also colorings, flavorings, additives  · Insect bites or stings such as bees, mosquitos, flees, ticks  · Medicines such as penicillin, sulfa drugs, amoxicillin, aspirin, ibuprofen; any medicine can cause a reaction  · Jewelry such as nickel, gold (new, or something youve worn for a while including zippers, and buttons)  · Latex such as in gloves, clothes, toys, balloons, or some tapes (some people allergic to latex may also have problems with foods like bananas, avocados, kiwi, papaya, or chestnuts)  · Lotions, perfumes, cosmetics, soaps, shampoos, skincare products, nail products  · Chemicals or dyes in clothing, linen, , hair dyes, soaps, iodine  Many viruses and common colds can cause a rash, but is not an allergic reaction. Sometimes it is hard to tell the difference between allergies, sensitivity and intolerance to something. This is especially true with food. Many things can cause diarrhea, vomiting, stomach cramps, and skin irritation.  Home care    The goal of our treatment is to help relieve the symptoms, and get you feeling better. The rash will usually fade over several days, but can sometimes last a couple of weeks. Over the next couple of days, there may be times when it is gets a little worse, and then better again. Here are some things to do:  · If you know what you are allergic to,  avoid it because future reactions could be worse than this one.  · Avoid tight clothing and anything that heats up your skin (hot showers/baths, direct sunlight) since heat will make itching worse.  · An ice pack will relieve local areas of intense itching and redness. Dont put the ice directly on the skin, because it can damage the skin. You can also ice put it in a plastic bag. Wrap it in something like a thin towel, opal shirt, or cloth, or use a bag of frozen peas.  · Oral Benadryl (diphenhydramine) is an antihistamine available at drug and grocery stores. Unless a prescription antihistamine was given, Benadryl may be used to reduce itching if large areas of the skin are involved. It may make you sleepy, so be careful using it in the daytime or when going to school, working, or driving. [NOTE: Do not use Benadryl if you have glaucoma or if you are a man with trouble urinating due to an enlarged prostate.] There are antihistamines that causes less drowsiness and are good alternatives for daytime use. Ask your pharmacist for suggestions.  · Do not use Benadryl cream on your skin, because in some people it can cause a further reaction, and make you allergic to Benadryl.  · Try not to scratch. This can tear the skin and cause an infection.  · Using heat-steam to clean your home, using high-efficiency particulate (HEPA) vacuums and filters, avoiding food and pet triggers, exterminating cockroaches, and frequent house cleaning are a few of the strategies used to decrease allergic reactions.  Follow-up care  Follow up with your healthcare provider, or as advised. If you had a severe reaction today, or if you have had several mild-moderate allergic reactions in the past, ask your doctor about allergy testing to find out what you are allergic to. If your reaction included dizziness, fainting or trouble breathing or swallowing, ask your doctor about carrying injectable epinephrine for home use.  Call 911  Call 911 if any  of these occur:  · Trouble breathing or swallowing, wheezing  · Hoarse voice or trouble speaking  · Confused  · Very drowsy or trouble awakening  · Fainting or loss of consciousness  · Rapid heart rate  · Low blood pressure  · Feeling of doom  · Nausea, vomiting, abdominal pain, diarrhea  · Vomiting blood, or large amounts of blood in stool  · Seizure  When to seek medical advice  Call your healthcare provider right away if any of these occur:  · Spreading areas of itching, redness or swelling  · New or worse swelling in the face, eyelids, lips, mouth, throat or tongue  · Dizziness, weakness  · Signs of infection:  ¨ Spreading redness  ¨ Increased pain or swelling  ¨ Fever (1 degree above your normal temperature) lasting for 24 to 48 hours  Date Last Reviewed: 7/30/2015 © 2000-2016 [employer]. [address] [place] [address]. All rights reserved. This information is not intended as a substitute for professional medical care. Always follow your healthcare professional's instructions.          Your Scheduled Appointments     Mar 14, 2017  8:00 AM CDT   Established Patient Visit with EMILY Huggins   [facility] ([facility])    [address] [place] [id number]   [phone]            Mar 27, 2017  8:00 AM CDT   Established Patient Visit with DASHAWN Livingston - Podiatry ([facility] )    [address]  [place] [address]   [phone]            Mar 27, 2017  9:15 AM CDT   Established Patient Visit with MD [facility]    [address]  [place] [address]   [phone]              [facility] Sign-Up     Activating your [facility] account is as easy as 1-2-3!     1) Visit [email or web address], select Sign Up Now, enter this activation code and your date of birth, then select Next.  [email or web address]  Expires: 4/1/2017  8:36 AM      2) Create a username and password to use when  you visit [facility] in the future and select a security question in case you lose your password and select Next.    3) Enter your e-mail address and click Sign Up!    Additional Information  If you have questions, please e-mail [email or web address] or call [phone] to talk to our [facility] staff. Remember, [facility] is NOT to be used for urgent needs. For medical emergencies, dial 911.          [facility] complies with applicable Federal civil rights laws and does not discriminate on the basis of race, color, national origin, age, disability, or sex.        Language Assistance Services     ATTENTION: Language assistance services are available, free of charge. Please call [phone].      ATENCIÓN: Si marivella mira, tiene a abad disposición servicios gratuitos de asistencia lingüística. Llame al [phone].     CHÚ Ý: N?u b?n nói Ti?ng Vi?t, có các d?ch v? h? tr? ngôn ng? mi?n phí dành cho b?n. G?i s? [phone].

## 2017-03-04 NOTE — DISCHARGE INSTRUCTIONS
Generalized Allergic Reaction (Other)  You are having an allergic reaction. Almost anything can cause one. Different people are allergic to different things. It is usually something that you ate or swallowed, came into contact with by getting or putting it on your skin or clothes, or something you breathed in the air. This can be very annoying and sometimes scary.  Most of us think of allergic reactions when we have a rash or itchy skin. Symptoms can include:  · Rash, hives, redness, welts, blisters  · Itching, burning, stinging, pain  · Dry, flaky, cracking, scaly skin  · Swelling of the face, lips or other parts of the body  · Hoarse voice  · Trouble swallowing, feeling like your throat is closing  · Trouble breathing, wheezing  · Nausea, vomiting, diarrhea, stomach cramps  · Feeling faint or lightheaded, rapid heart rate  Sometimes the cause may be obvious. However, there are so many things that can cause a reaction that you may not be able to figure out. The most important things to help find your allergen are:  · Remembering when it started  · What you were doing at the time or just before that  · Any activities you were involved in  · Any new products or contacts  Here are some common causes, but remember almost anything can cause a reaction, and you may not even be aware that you came into contact with one of these things.  · Dust, mold, pollen  · Plants, such aspoison ivy and poison oak are common ones, but there are many others  · Animals  · Foods such as shrimp, shellfish, peanuts, milk products, gluten, eggs; also colorings, flavorings, additives  · Insect bites or stings such as bees, mosquitos, flees, ticks  · Medicines such as penicillin, sulfa drugs, amoxicillin, aspirin, ibuprofen; any medicine can cause a reaction  · Jewelry such as nickel, gold (new, or something youve worn for a while including zippers, and buttons)  · Latex such as in gloves, clothes, toys, balloons, or some tapes (some people  allergic to latex may also have problems with foods like bananas, avocados, kiwi, papaya, or chestnuts)  · Lotions, perfumes, cosmetics, soaps, shampoos, skincare products, nail products  · Chemicals or dyes in clothing, linen, , hair dyes, soaps, iodine  Many viruses and common colds can cause a rash, but is not an allergic reaction. Sometimes it is hard to tell the difference between allergies, sensitivity and intolerance to something. This is especially true with food. Many things can cause diarrhea, vomiting, stomach cramps, and skin irritation.  Home care    The goal of our treatment is to help relieve the symptoms, and get you feeling better. The rash will usually fade over several days, but can sometimes last a couple of weeks. Over the next couple of days, there may be times when it is gets a little worse, and then better again. Here are some things to do:  · If you know what you are allergic to, avoid it because future reactions could be worse than this one.  · Avoid tight clothing and anything that heats up your skin (hot showers/baths, direct sunlight) since heat will make itching worse.  · An ice pack will relieve local areas of intense itching and redness. Dont put the ice directly on the skin, because it can damage the skin. You can also ice put it in a plastic bag. Wrap it in something like a thin towel, opal shirt, or cloth, or use a bag of frozen peas.  · Oral Benadryl (diphenhydramine) is an antihistamine available at drug and grocery stores. Unless a prescription antihistamine was given, Benadryl may be used to reduce itching if large areas of the skin are involved. It may make you sleepy, so be careful using it in the daytime or when going to school, working, or driving. [NOTE: Do not use Benadryl if you have glaucoma or if you are a man with trouble urinating due to an enlarged prostate.] There are antihistamines that causes less drowsiness and are good alternatives for daytime use. Ask  your pharmacist for suggestions.  · Do not use Benadryl cream on your skin, because in some people it can cause a further reaction, and make you allergic to Benadryl.  · Try not to scratch. This can tear the skin and cause an infection.  · Using heat-steam to clean your home, using high-efficiency particulate (HEPA) vacuums and filters, avoiding food and pet triggers, exterminating cockroaches, and frequent house cleaning are a few of the strategies used to decrease allergic reactions.  Follow-up care  Follow up with your healthcare provider, or as advised. If you had a severe reaction today, or if you have had several mild-moderate allergic reactions in the past, ask your doctor about allergy testing to find out what you are allergic to. If your reaction included dizziness, fainting or trouble breathing or swallowing, ask your doctor about carrying injectable epinephrine for home use.  Call 911  Call 911 if any of these occur:  · Trouble breathing or swallowing, wheezing  · Hoarse voice or trouble speaking  · Confused  · Very drowsy or trouble awakening  · Fainting or loss of consciousness  · Rapid heart rate  · Low blood pressure  · Feeling of doom  · Nausea, vomiting, abdominal pain, diarrhea  · Vomiting blood, or large amounts of blood in stool  · Seizure  When to seek medical advice  Call your healthcare provider right away if any of these occur:  · Spreading areas of itching, redness or swelling  · New or worse swelling in the face, eyelids, lips, mouth, throat or tongue  · Dizziness, weakness  · Signs of infection:  ¨ Spreading redness  ¨ Increased pain or swelling  ¨ Fever (1 degree above your normal temperature) lasting for 24 to 48 hours  Date Last Reviewed: 7/30/2015  © 3387-0675 [employer]. [address] [place] [address]. All rights reserved. This information is not intended as a substitute for professional medical care. Always follow your healthcare professional's  instructions.

## 2017-03-04 NOTE — ED TRIAGE NOTES
Pt reports to ED with complaints of allergic reaction to a meal she was cooking around 9 pm. Pt states she ate the food and then after she had a scratchy throat and bumps on her face. Pt states she thinks her throat is closing and it is harder for her to breath bc she has asthma.

## 2017-03-07 ENCOUNTER — TELEPHONE (OUTPATIENT)
Dept: INTERNAL MEDICINE | Facility: CLINIC | Age: 52
End: 2017-03-07

## 2017-03-07 RX ORDER — INSULIN LISPRO 100 [IU]/ML
11 INJECTION, SOLUTION INTRAVENOUS; SUBCUTANEOUS
Qty: 3 ML | Refills: 11 | Status: CANCELLED | OUTPATIENT
Start: 2017-03-07

## 2017-03-07 NOTE — TELEPHONE ENCOUNTER
----- Message from Hubert Bautista sent at 3/7/2017 12:49 PM CST -----  Contact: Prudencio/ [facility]/ [phone]   Type: Rx Clarification/ Additional Information/ Questions    Medication:insulin lispro (HUMALOG) 100 unit/mL injection    What questions do you have about the medication, if any?    What information is needed?    Pharmacy number:    Pharmacy Contact Name:    Comments: pharmacy is calling to get clarification on the directions for the medication above.  Please call and advise.    Thank you

## 2017-03-10 ENCOUNTER — TELEPHONE (OUTPATIENT)
Dept: INTERNAL MEDICINE | Facility: CLINIC | Age: 52
End: 2017-03-10

## 2017-03-10 RX ORDER — INSULIN LISPRO 100 [IU]/ML
11 INJECTION, SOLUTION INTRAVENOUS; SUBCUTANEOUS
Qty: 3 ML | Refills: 11 | Status: SHIPPED | OUTPATIENT
Start: 2017-03-10 | End: 2017-05-05 | Stop reason: SDUPTHER

## 2017-03-10 NOTE — TELEPHONE ENCOUNTER
----- Message from Amanda Kahn sent at 3/10/2017  1:01 PM CST -----  Contact: [facility] [phone]  [facility] is calling regarding a fax that had been sent twice already for patient to received Humalog, they need the sliding scale and it must be signed and dated so [facility] can submitted to the patients [employer]. They will refax it again today. They need today is due on Monday    Thank you

## 2017-03-14 ENCOUNTER — OFFICE VISIT (OUTPATIENT)
Dept: PAIN MEDICINE | Facility: CLINIC | Age: 52
End: 2017-03-14
Payer: MEDICARE

## 2017-03-14 VITALS
DIASTOLIC BLOOD PRESSURE: 76 MMHG | HEART RATE: 82 BPM | BODY MASS INDEX: 47.09 KG/M2 | SYSTOLIC BLOOD PRESSURE: 120 MMHG | HEIGHT: 66 IN | WEIGHT: 293 LBS | TEMPERATURE: 98 F

## 2017-03-14 DIAGNOSIS — M47.816 FACET ARTHRITIS OF LUMBAR REGION: Primary | ICD-10-CM

## 2017-03-14 DIAGNOSIS — G89.29 CHRONIC PAIN OF BOTH KNEES: ICD-10-CM

## 2017-03-14 DIAGNOSIS — M25.561 CHRONIC PAIN OF BOTH KNEES: ICD-10-CM

## 2017-03-14 DIAGNOSIS — T84.018D FAILED TOTAL KNEE ARTHROPLASTY, SUBSEQUENT ENCOUNTER: ICD-10-CM

## 2017-03-14 DIAGNOSIS — M25.562 CHRONIC PAIN OF BOTH KNEES: ICD-10-CM

## 2017-03-14 DIAGNOSIS — G89.4 CHRONIC PAIN DISORDER: ICD-10-CM

## 2017-03-14 DIAGNOSIS — Z96.659 FAILED TOTAL KNEE ARTHROPLASTY, SUBSEQUENT ENCOUNTER: ICD-10-CM

## 2017-03-14 DIAGNOSIS — Z79.891 ENCOUNTER FOR LONG-TERM OPIATE ANALGESIC USE: ICD-10-CM

## 2017-03-14 PROCEDURE — 99213 OFFICE O/P EST LOW 20 MIN: CPT | Mod: PBBFAC | Performed by: NURSE PRACTITIONER

## 2017-03-14 PROCEDURE — 99213 OFFICE O/P EST LOW 20 MIN: CPT | Mod: S$PBB,,, | Performed by: NURSE PRACTITIONER

## 2017-03-14 PROCEDURE — 99999 PR PBB SHADOW E&M-EST. PATIENT-LVL III: CPT | Mod: PBBFAC,,, | Performed by: NURSE PRACTITIONER

## 2017-03-14 RX ORDER — OXYCODONE AND ACETAMINOPHEN 10; 325 MG/1; MG/1
1 TABLET ORAL EVERY 6 HOURS PRN
Qty: 120 TABLET | Refills: 0 | Status: SHIPPED | OUTPATIENT
Start: 2017-03-15 | End: 2017-04-11 | Stop reason: SDUPTHER

## 2017-03-14 NOTE — PROGRESS NOTES
Subjective:      Patient ID: Alivia Thomas is a 51 y.o. female.    Chief Complaint: No chief complaint on file.    Referred by: No ref. provider found     Interval History 3/14/2017:  The patient returns today for follow up of back and knee pain.  She continues with measures for weight loss.  She is still planning on bariatric surgery but would like to lose weight on her own prior to this.  She has lost about 4 lbs since her visit with me last month.  She continues to take Percocet which helps her pain without adverse effects.  Her pain today is 8/10.  The patient denies any bowel or bladder incontinence or signs of saddle paresthesia.  The patient denies any major medical changes since last office visit.    Interval History 2/15/2017:  The patient returns today for follow up and medication refill.  She is still planning on having weight loss surgery in the future.  She admits that she has not been as active as previously.  She has a follow up with Dr. Swan scheduled next month.  She continues to take Percocet with benefit.  Her pain today is 8/10.      Interval History 1/18/2017:  The patient returns for follow up and medication refill.  She reports no major changes in her back and knee pain since her last visit.  She has had a lot going on with health issues of family members.  She takes care of her father and her brother, who were both recently hospitalized.  She still plans on having weight loss surgery in the future.  Her pain today is.  She is taking Percocet with benefit and without side effects at this time.    Interval History 12/15/2016:  The patient returns today for follow up of lower back and bilateral knee pain.  She continues to report relief from cooled lumbar RFAs in October.  She feels as though the colder weather is causing increased knee pain.  Since her last visit, she has decided to have weight loss surgery.  She was evaluated by bariatrics and is undergoing pre-op workup at this time.   Her pain today is 8/10.  She continue to take Percocet with significant benefit.      Interval History 11/3/2016:  The patient returns today for follow up of back pain.  She is s/p left then right L2,3,4,5 cooled RFA completed on 10/19/16 with 80% pain relief.  She is very satisfied with these results.  She continues to take Percocet with relief.  She has started kick boxing classes and continues to lose weight.  She has noticeable weight loss since her last visit and is very happy about this.  Her pain today is 8/10.    Interval History 9/16/2016:  The patient returns today with complaints of lower back and knee pain.  Her worst pain is in her lower back without radiation.  She has lost weight since her last weight.  She has increased her exercise which is helping.  She continues with her diet plan.  She is having the pool at her home fixed and plans to use this for exercise, as she has benefited from frequent pool therapy at [facility].  She previously had significant relief with lumbar RFAs in April for about 4 months.  She would like to repeat the procedures.  She continues to take Percocet as needed which provides her relief.  Her pain today is 8/10.  The patient denies any bowel or bladder incontinence or signs of saddle paresthesia.      Interval History 8/19/2016:  The patient returns today for follow up and medication refill.  She complains of back and knee pain.  Her back pain does not radiate.  She did have relief with RFA in April but feels the pain is returning.  Her previous UTI has resolved.  She has been unable to return to her aquatic therapy because she is caring for her father.  She plans to start walking in the morning before her father wakes up.  She has gained a few pounds since her last visit and is upset about this, as she was previously losing at each visit.  She is also trying to control her diet.  She continues to take Percocet with significant pain relief.  Her pain today is 8/10.   The patient denies any bowel or bladder incontinence or signs of saddle paresthesia.  The patient denies any major medical changes since last office visit.    Interval History 7/19/2016:  The patient returns today for follow up.  She has a history of lower back and bilateral knee pain.  Since her last visit, she reports being diagnosed with a UTI and is currently on antibiotics. [facility]  She has been performing a home exercise routine.  She has lost 6 lbs since her visit last month.  She is taking Percocet as needed for pain.  She reports efficacy without adverse effects.  Her pain today is 7/10.      Interval History 6/20/2016:  Patient returns for follow up and medication refill.  She has a history of lower back and bilateral knee pain.  Since her last encounter, she reports that she has been suffering with an upper respiratory infection.  She saw Dr. Richardson last week and was started on oral Augmentin and antibiotic eye drops.  She reports that whenever she is sick, she suffers with swelling and drainage to her left eye.  She states that her symptoms have improved since starting the eye drops.  She has been unable to participate in her daily pool therapy since she has been sick but is anxious to resume once she is feeling better.  She did have recent labwork which showed an improving A1C with cholesterol and triglycerides WNL.  She is proud of herself about this, as she reports be very good with her diet recently.  Her pain today is an 8/10.    Interval History 5/5/2016:  Patient returns today for procedure follow up.  She is s/p left then right L2,3,4,5 RFA completed on 4/20/16 with 70% pain relief so far.  She reports lower back and bilateral knee pain.  She has had genicular nerve blocks in the past which provided significant relief for her left knee pain and limited relief of her right knee pain.  She is currently doing physical therapy per self.  She is doing 45  minutes of pool therapy five days per week.  She thinks that this is helping with her pain and mobility.  She is trying to lose weight because she is aware that this will help with her pain.  She is taking percocet as needed which provided relief without side effects.  Her pain today is a 5/10.      Interval History: 3/28/2016:  Patient returns today for follow up of lower back and bilateral knee pain.  She is s/p Bilateral L2,3,4,5 MBB on 2/16/16 with 80% relief for 5 days and bilateral L2,3,4,5 MBB on 3/1/16 with 70% pain relief for 1 day.  She is requesting to schedule the RFAs.  The worst of her pain is located to her left lower back and does not radiate. She is still complaining of bilateral knee pain which is worse with walking and activity.  Her pain today is a 9/10.  The patient denies any bowel/bladder incontinence or symptoms of saddle paresthesia.  The patient denies any major medical changes since last OV. She is currently taking Percocet which helps her pain without any adverse effects.     Interval History: 12/17/2015:  Patient presents in clinic for follow up for lower back, bilateral knee, and right arm pain. Her pain is 9/10 today. She underwent carpal tunnel revision 12/14/15 in the right wrist. She is currently out of her Percocet 10-325mg prescription.   Cont to have significant low back pain, wh will also ich she reports is her worst current pain.  Based on previous imaging we know that the patient has significant facet arthropathy in the lumbar spine at the levels of L3-4 and L4-5 L5-S1.  She describes dull achy with occasional sharp pain rates as 7/10.     Interval history 10/26/2015:  Patient returns to clinic for follow up previously seen for knee pain and low back pain. She reports pain is improved with Percocet 10/325 BID. She is no longer taking Lyrica and recently started Topamax. She continues to take Celebrex once per day and does help. She also continues to use a topical cream PRN and  does help some. She has completed therapy since last visit but she is continuing to exercise regularly. Her pain in back and knees is unchanged in quality and distribution from previous visits. She otherwise denies any new issues at this time.     Interval history 9/21/2015:  Since previous encounter the patient comes in after having started using gabapentin and developing swelling in her legs.  She states that it did make a difference for her pain although she discontinued it secondary to swelling after a decrease in her dosing did not alleviate this.  She followed up with her orthopedist and did have x-rays performed which did not show any significant change compared to previous.  She is scheduled for a four-month follow-up.  She has not yet begun exercising but will begin soon she is scheduled for pool-based therapy.  The opioid medications have been helping her and her pain twice a day.  Further decrease to once a day prevented her from being able to function.  She does continue to take Celebrex without adverse reaction.  Additionally the patient stated that she has been having lower back pain which is new.    Interval History 08-: Since previous visit patient comes in today to discuss her medications.  Patient states she is having pain in the lower back and both knee, sharp , throbbing , burning, and stabbing pain, she rates it 8/10.  Patient is taking percocet 10/325mg, we have been weaning her from this medication last prescription was provided for 30 tablets.  Additionally the patient is taking Celebrex with regularity with some improvement in her pain.  She has completed physical therapy status post knee replacement her knee hardware appears appropriate she has a scheduled appointment to follow-up with her orthopedic surgeon in one week to discuss using a extension brace while she is at home laying in bed.  She continues to swim twice a week and is actively trying to lose weight and has been  dieting.    Interval History 06/19/2015:  Patient presents in clinic for two month follow up. She reports her bilateral knee pain and low back pain is an 8/10. She currently takes celebrex and Percocet for pain and uses a topical cream.  She was recently evaluated by her orthopedist and the hardware all appears to be appropriately placed and the next follow-up is in 6 weeks.  The patient continues to work on weight loss and exercise and states that she has been doing more than in the past.   Patient reports no other health changes since previous encounter.    Interval History 04/09/2015:  Patient presents in clinic for one month follow up. She reports bilateral knee pain and low back pain is a 9/10 today. She currently takes percocet for pain as needed and uses a cane for ambulation. She states that the low back pain is new.  She continues to have bilateral knee pain and is in physical therapy.  She continues to take Celebrex daily and uses a topical pain cream regularly.    Patient reports no other health changes since previous encounter.    Interval history 3/5/2015:  Since previous encounter patient is status post right total knee replacement on 11/4/2014 and has been healed with postoperative visits showing good progress.  The patient does have continued physical therapy sessions and has been attempting to lose weight and has lost approximately 25 pounds although her BMI continues to be 54.  She has been making good efforts to try and continue to increase her range of motion and lose weight.  She continues to have significant pain in bilateral knees and continues to use a cane for ambulation.  She was receiving oxycodone/acetaminophen 10/325 every 8 hours by mouth when necessary and requiring in order to persist in her physical therapy although the topical pain cream that she has been applying has been helping her to a limited degree she still requires the medication regularly.  Her recent x-ray imaging shows  good positioning of the prosthesis.  No other health changes since previous encounter.     Interval history 2/17/2014:  Patient reports that she has been using the topical compounded cream on the knee approximately 4 times per day and she states that it does help her with her pain that she is experiencing.  She states that she has not gone to formal physical therapy but that she has been going to a pool that has exercise classes which is free for her and that she feels like it is helping her continue to lose weight.  Additionally she continues using hydrocodone/acetaminophen 7.5/750 approximately 3 times per day when necessary and states that also helps with her pain symptoms.  He she's still talks to have replacement for the left knee, but would like to lose weight further before going to that.  She has also had previous injections into her knees which have offered little to no relief in her pain symptoms.  She has had no other health changes since previous encounter.    Previous encounter 1/21/2014:  HPI Comments: 49 yo female presents for initial evaluation of bilateral knee pain, L>R. She is s/p arthroscopic right knee surgery and eventual replacement and then revision surgeries (Dr. Swan, Orthopedics). The pain is present in both knees and is described as a terrible ache. She hears occasional popping in both knees with movement. The pain is worse with being on her feet and getting up from a sitting to standing position. Denies lower ext weakness or paresthesias. She does not have back pain or pain radiating down her legs. The pain is better with Celebrex and rest. She also takes Vicodin ES TID but this makes her very sleepy. She is not sure it helps with the pain because she usually falls asleep.     Physical Therapy: not since 2011 just prior to her 3rd right knee surgery (revision after replacement); has tried swimming which helps with weight loss    Non-pharmacologic Treatment: none    Pain Medications:  Percocet and celebrex    Blood thinners: ASA 81 mg daily     Interventional Therapies: steroid inj and visco-supplementation (series of 3) in left knee- not helpful  3/31/14 Bilateral genicular nerve block- significant relief of left knee, limited relief of right knee pain  2/16/16 Bilateral L2,3,4,5 MBB  3/1/16 Bilateral L2,3,4,5 MBB   4/6/16 Left L2,3,4,5 RFA- significant relief  4/20/16 Right L2,3,4,5 RFA- significant relief  10/5/16 Left L2,3,4,5 cooled RFA  10/19/16 Right L2,3,4,5 cooled RFA      Relevant Surgeries: right knee surgery x3 (arthroscopic, then replacement and subsequent revision surgery), s/p left total knee arthroplasty    Relevant Imaging:  Xray Lumbar spine 09/21/2015  Lumbar spine radiograph    Comparison: None    Results: AP, lateral neutral, lateral flexion , lateral extension, bilateral oblique and spot views. The alignment of the lumbar spine demonstrates a mild levoscoliosis . 11-mm anterior listhesis of L4 relative to L5 with no translational abnormalities  seen on flexion and extension views. The vertebral body heights are well-maintained , mild disk space narrowing L4-L5 and L5-S1. Mild anterior and marginal osteophyte formation seen throughout the lumbar spine . The oblique views demonstrate no   definite spondylolysis. There is facet joint osseous hypertrophy noted at L3-L4 and L4-L5.      Impression         The Significant spondylosis of the lumbar spine with grade 1 anterior listhesis L4 relative to L5.  Facet joint osseous hypertrophy L3-L4 and L4-5.      Electronically signed by: BLESSING ROE MD  Date: 09/21/15  Time: 09:56           knee x-rays (see below) x-ray knee bilateral 8/26/2015:  Standing AP knees, lateral view of both knees and sunrise view of both patella. Study compared to May 2015. Postop change of bilateral knee replacement. The prosthetic components are in satisfactory position. Erosive changes involving the patella   again evident  bilaterally.    Impression no significant change.      Xray Bilateral Knee 05/25/2015:  There are bilateral total knee arthroplasties with posterior resurfacing of the patella. As observed on 12/17/2014 there is a fracture of the left patella in the parasagittal plane.    Heterotopic bone is evident about each knee.    I detect no dislocation, unusual radiopaque retained foreign body, lytic or blastic lesion, or chondrocalcinosis.    Lab Results   Component Value Date    HGBA1C 10.6 (H) 12/15/2016     Lab Results   Component Value Date    CHOL 200 (H) 12/15/2016    CHOL 153 06/17/2016    CHOL 157 04/01/2015     Lab Results   Component Value Date    HDL 40 12/15/2016    HDL 44 06/17/2016    HDL 44 04/01/2015     Lab Results   Component Value Date    LDLCALC 141.0 12/15/2016    LDLCALC 92.6 06/17/2016    LDLCALC 97.2 04/01/2015     Lab Results   Component Value Date    TRIG 95 12/15/2016    TRIG 82 06/17/2016    TRIG 79 04/01/2015     Lab Results   Component Value Date    CHOLHDL 20.0 12/15/2016    CHOLHDL 28.8 06/17/2016    CHOLHDL 28.0 04/01/2015         Past Medical History:   Diagnosis Date    Asthma     Diabetes mellitus type II     DJD (degenerative joint disease) of knee 6/19/2014    Hyperlipidemia     Morbid obesity     Sleep apnea      Past Surgical History:   Procedure Laterality Date    CARPAL TUNNEL RELEASE      CARPAL TUNNEL RELEASE  1980s    left    CARPAL TUNNEL RELEASE  2012    right    JOINT REPLACEMENT Bilateral     with 2 revisions on rt    KNEE SURGERY  3/2010    orthroscope    KNEE SURGERY  6-19-14    left TKR     Family History   Problem Relation Age of Onset    Diabetes Mother     Hypertension Mother     Cataracts Mother     Diabetes Father     Cataracts Father     Coronary artery disease Brother     Amblyopia Neg Hx     Blindness Neg Hx     Cancer Neg Hx     Glaucoma Neg Hx     Macular degeneration Neg Hx     Retinal detachment Neg Hx     Strabismus Neg Hx     Stroke  Neg Hx     Thyroid disease Neg Hx      Social History     Social History    Marital status: Single     Spouse name: N/A    Number of children: N/A    Years of education: N/A     Occupational History    Not on file.     Social History Main Topics    Smoking status: Never Smoker    Smokeless tobacco: Never Used    Alcohol use Yes      Comment: occasionally     Drug use: No    Sexual activity: Yes     Partners: Female     Birth control/ protection: None     Other Topics Concern    Not on file     Social History Narrative    Disabled. The patient is the youngest of 6 siblings. Single. Lives with single-sex partner.                      Review of patient's allergies indicates:  No Known Allergies    Current Outpatient Prescriptions:     albuterol 90 mcg/actuation inhaler, Take 2 puffs every 4-6 hours  as needed for shortness of breath/wheezing, Disp: 1 Inhaler, Rfl: 6    aspirin (ECOTRIN) 81 MG EC tablet, Take 1 tablet by mouth every morning. prevents heart attacks and strokes, Disp: , Rfl:     atorvastatin (LIPITOR) 20 MG tablet, Take 1 tablet (20 mg total) by mouth once daily., Disp: 30 tablet, Rfl: 6    blood sugar diagnostic Strp, Freestyle light strips and lancets, Disp: 100 each, Rfl: 6    celecoxib (CELEBREX) 200 MG capsule, One capsule daily, Disp: 30 capsule, Rfl: 2    econazole nitrate 1 % cream, Apply topically 2 (two) times daily., Disp: 30 g, Rfl: 2    ergocalciferol (ERGOCALCIFEROL) 50,000 unit Cap, Take 1 capsule (50,000 Units total) by mouth twice a week., Disp: 24 capsule, Rfl: 0    fluconazole (DIFLUCAN) 150 MG Tab, One daily for 3 days, Disp: 3 tablet, Rfl: 3    insulin detemir (LEVEMIR FLEXPEN) 100 unit/mL (3 mL) SubQ InPn pen, Inject 20 Units into the skin every evening., Disp: 1 Box, Rfl: 11    insulin lispro (HUMALOG) 100 unit/mL injection, Inject 11 Units into the skin 3 (three) times daily before meals. Glucose        Intervention  (units to add in addition to meal time  "Humalog base dose of 11 Units)                       0-80    orange juice            0 units    151 - 200       +2 Units    201 - 250       +4 Units    251 - 300       +6 Units    301 - 350        +8 Units    351 - 400       +10 Units    > 400           Call MD, Disp: 3 mL, Rfl: 11    lisinopril (PRINIVIL,ZESTRIL) 2.5 MG tablet, One daily for proteinuria., Disp: 30 tablet, Rfl: 6    metformin (GLUCOPHAGE-XR) 500 MG 24 hr tablet, Take 2 tablets (1,000 mg total) by mouth 2 (two) times daily., Disp: 360 tablet, Rfl: 6    omeprazole (PRILOSEC) 20 MG capsule, Take 1 capsule (20 mg total) by mouth every morning., Disp: 30 capsule, Rfl: 6    oxycodone-acetaminophen (PERCOCET)  mg per tablet, Take 1 tablet by mouth every 6 (six) hours as needed for Pain., Disp: 120 tablet, Rfl: 0    vitamin D 185 MG Tab, Take 5,000 mg by mouth once daily., Disp: , Rfl:     REVIEW OF SYSTEMS:    GENERAL:  Patient is actively trying to lose weight.  RESPIRATORY:  Negative for cough, wheezing or shortness of breath, patient denies any recent URI.  CARDIOVASCULAR:  Negative for chest pain, leg swelling or palpitations.  GI:  Negative for abdominal discomfort, blood in stools or black stools or change in bowel habits, occasional constipation.  MUSCULOSKELETAL:  See HPI.  SKIN:  Negative for lesions, rash, and itching.  PSYCH:  No mood disorder or recent psychosocial stressors.  Patient's sleep is disturbed secondary to pain (patient reports also that she has insomnia).  HEMATOLOGY/LYMPHOLOGY:  Negative for prolonged bleeding, bruising easily or swollen nodes.  81 mg aspirin  ENDO: Patient has a history of diabetes  NEURO:   No history of headaches, syncope, paralysis, seizures or tremors.  All other reviewed and negative other than HPI.    OBJECTIVE:    /76  Pulse 82  Temp 98.3 °F (36.8 °C)  Ht 5' 6" (1.676 m)  Wt (!) 163.5 kg (360 lb 7.2 oz)  LMP 03/04/2017 (Exact Date)  BMI 58.18 kg/m2    PHYSICAL " EXAMINATION:    GENERAL: Well appearing, in no acute distress, alert and oriented x3.   PSYCH:  Mood and affect appropriate.  SKIN: Skin color, texture, turgor normal, no rashes or lesions.  HEAD/FACE:  Normocephalic, atraumatic.   CV: RRR with palpation of the radial artery.  PULM: No evidence of respiratory difficulty, symmetric chest rise.  BACK: No pain to palpation over the lumbar facet joints.  Limited lumbar extension with pain.  Positive bilateral facet loading, R>L.  Negative SLR bilaterally.  Pain with palpation to bilateral SI joints.  JENNA is negative bilaterally.  EXTREMITIES:  Well-healed midline scars to bilateral knees.  Painful extension and flexion of bilateral knees.  Medial and lateral joint line tenderness to bilateral knees.    MUSCULOSKELETAL: Bilateral upper and lower extremity strength is normal and symmetric.  No atrophy or tone abnormalities are noted.  NEURO: Bilateral lower extremity coordination and muscle stretch reflexes are physiologic and symmetric.  Plantar response are downgoing. No clonus.  No loss of sensation is noted.  GAIT: Antalgic- ambulating without assistance.       Assessment:       Encounter Diagnoses   Name Primary?    Facet arthritis of lumbar region Yes    Failed total knee arthroplasty, subsequent encounter     Chronic pain of both knees     Chronic pain disorder     Encounter for long-term opiate analgesic use          Plan:       - Previous imaging was reviewed and discussed with the patient today.     - Continue to f/u with bariatrics for possible gastric sleeve surgery.  She will continue with weight loss.    - Can repeat cooled RFA after April 2017.  Will schedule at OV next month.    - Continue oxycodone-acetaminophen 10/325 one tablet every 6 hours PRN pain.    - The [place] Board of Pharmacy website for prescription monitoring was consulted today, and it does not suggest any deviations in conflict with the patient's controlled substance contract with  our clinic. Will continue current therapy with frequent monitoring of the controlled substance database, and urine drug screens on followup.     - UDS from 1/18/17 was reviewed and is compliant.      - Continue topical pain cream PRN.    - RTC in 1 month or sooner if needed.    - Dr. Wagner was consulted on the patient and agrees with this plan.      The above plan and management options were discussed at length with patient. Patient is in agreement with the above and verbalized understanding.     EMILY Asencio  03/14/2017

## 2017-03-14 NOTE — MR AVS SNAPSHOT
[facility]  [address]  [facility] [address]  Phone: [phone]  Fax: [phone]                  Alivia Thomas   3/14/2017 8:00 AM   Office Visit    Description:  Female : 1965   Provider:  EMILY Huggins   Department:  [facility]           Diagnoses this Visit        Comments    Facet arthritis of lumbar region    -  Primary     Failed total knee arthroplasty, subsequent encounter         Chronic pain of both knees         Chronic pain disorder         Encounter for long-term opiate analgesic use                To Do List           Future Appointments        Provider Department Dept Phone    3/27/2017 8:00 AM DASHAWN [facility]ston - Podiatry [phone]    3/27/2017 9:15 AM MD Tom Granados [facility]ich[facility] - Orthopedics [phone]    2017 8:00 AM EMILY Huggins [facility] [phone]      Goals (5 Years of Data)     None       These Medications        Disp Refills Start End    oxycodone-acetaminophen (PERCOCET)  mg per tablet 120 tablet 0 3/15/2017 2017    Take 1 tablet by mouth every 6 (six) hours as needed for Pain. - Oral    Pharmacy: [facility] [address] Ph #: [phone]         [facility]     [facility] On Call Nurse Care Line -  Assistance  Registered nurses in the [facility] Center provide clinical advisement, health education, appointment booking, and other advisory services.  Call for this free service at [phone].             Medications           Message regarding Medications     Verify the changes and/or additions to your medication regime listed below are the same as discussed with your clinician today.  If any of these changes or additions are incorrect, please notify your healthcare provider.             Verify that the below list of medications is an accurate representation of the medications you are  currently taking.  If none reported, the list may be blank. If incorrect, please contact your healthcare provider. Carry this list with you in case of emergency.           Current Medications     albuterol 90 mcg/actuation inhaler Take 2 puffs every 4-6 hours  as needed for shortness of breath/wheezing    aspirin (ECOTRIN) 81 MG EC tablet Take 1 tablet by mouth every morning. prevents heart attacks and strokes    atorvastatin (LIPITOR) 20 MG tablet Take 1 tablet (20 mg total) by mouth once daily.    blood sugar diagnostic Strp Freestyle light strips and lancets    celecoxib (CELEBREX) 200 MG capsule One capsule daily    econazole nitrate 1 % cream Apply topically 2 (two) times daily.    ergocalciferol (ERGOCALCIFEROL) 50,000 unit Cap Take 1 capsule (50,000 Units total) by mouth twice a week.    insulin detemir (LEVEMIR FLEXPEN) 100 unit/mL (3 mL) SubQ InPn pen Inject 20 Units into the skin every evening.    insulin lispro (HUMALOG) 100 unit/mL injection Inject 11 Units into the skin 3 (three) times daily before meals. Glucose        Intervention  (units to add in addition to meal time Humalog base dose of 11 Units)                        0-80    orange juice             0 units     151 - 200       +2 Units     201 - 250       +4 Units     251 - 300       +6 Units     301 - 350        +8 Units     351 - 400       +10 Units     > 400           Call MD    lisinopril (PRINIVIL,ZESTRIL) 2.5 MG tablet One daily for proteinuria.    metformin (GLUCOPHAGE-XR) 500 MG 24 hr tablet Take 2 tablets (1,000 mg total) by mouth 2 (two) times daily.    omeprazole (PRILOSEC) 20 MG capsule Take 1 capsule (20 mg total) by mouth every morning.    oxycodone-acetaminophen (PERCOCET)  mg per tablet Starting on Mar 15, 2017. Take 1 tablet by mouth every 6 (six) hours as needed for Pain.    vitamin D 185 MG Tab Take 5,000 mg by mouth once daily.    fluconazole (DIFLUCAN) 150 MG Tab One daily for 3 days           Clinical Reference  "Information           Your Vitals Were     BP Pulse Temp Height Weight Last Period    120/76 82 98.3 °F (36.8 °C) 5' 6" (1.676 m) 163.5 kg (360 lb 7.2 oz) 03/04/2017 (Exact Date)    BMI                58.18 kg/m2          Blood Pressure          Most Recent Value    BP  120/76      Allergies as of 3/14/2017     No Known Allergies      Immunizations Administered on Date of Encounter - 3/14/2017     None      [facility] Sign-Up     Activating your [facility] account is as easy as 1-2-3!     1) Visit [email or web address], select Sign Up Now, enter this activation code and your date of birth, then select Next.  [email or web address]  Expires: 4/1/2017  9:36 AM      2) Create a username and password to use when you visit [facility] in the future and select a security question in case you lose your password and select Next.    3) Enter your e-mail address and click Sign Up!    Additional Information  If you have questions, please e-mail [email or web address] or call [phone] to talk to our [facility] staff. Remember, [facility] is NOT to be used for urgent needs. For medical emergencies, dial 911.         Language Assistance Services     ATTENTION: Language assistance services are available, free of charge. Please call [phone].      ATENCIÓN: Si habla español, tiene a abad disposición servicios gratuitos de asistencia lingüística. Llame al [phone].     CHÚ Ý: N?u b?n nói Ti?ng Vi?t, có các d?ch v? h? tr? ngôn ng? mi?n phí dành cho b?n. G?i s? [phone].         [facility] complies with applicable Federal civil rights laws and does not discriminate on the basis of race, color, national origin, age, disability, or sex.        "

## 2017-03-23 DIAGNOSIS — M25.561 PAIN IN BOTH KNEES, UNSPECIFIED CHRONICITY: Primary | ICD-10-CM

## 2017-03-23 DIAGNOSIS — M25.562 PAIN IN BOTH KNEES, UNSPECIFIED CHRONICITY: Primary | ICD-10-CM

## 2017-03-27 ENCOUNTER — HOSPITAL ENCOUNTER (OUTPATIENT)
Dept: RADIOLOGY | Facility: HOSPITAL | Age: 52
Discharge: HOME | End: 2017-03-27
Attending: ORTHOPAEDIC SURGERY
Payer: MEDICARE

## 2017-03-27 ENCOUNTER — OFFICE VISIT (OUTPATIENT)
Dept: ORTHOPEDICS | Facility: CLINIC | Age: 52
End: 2017-03-27
Payer: MEDICARE

## 2017-03-27 ENCOUNTER — TELEPHONE (OUTPATIENT)
Dept: ORTHOPEDICS | Facility: CLINIC | Age: 52
End: 2017-03-27

## 2017-03-27 ENCOUNTER — OFFICE VISIT (OUTPATIENT)
Dept: PODIATRY | Facility: CLINIC | Age: 52
End: 2017-03-27
Payer: MEDICARE

## 2017-03-27 VITALS
BODY MASS INDEX: 47.09 KG/M2 | WEIGHT: 293 LBS | SYSTOLIC BLOOD PRESSURE: 133 MMHG | HEART RATE: 77 BPM | HEIGHT: 66 IN | DIASTOLIC BLOOD PRESSURE: 80 MMHG

## 2017-03-27 VITALS — WEIGHT: 293 LBS | HEIGHT: 65 IN | BODY MASS INDEX: 48.82 KG/M2

## 2017-03-27 DIAGNOSIS — M25.561 PAIN IN BOTH KNEES, UNSPECIFIED CHRONICITY: ICD-10-CM

## 2017-03-27 DIAGNOSIS — E11.49 TYPE II DIABETES MELLITUS WITH NEUROLOGICAL MANIFESTATIONS: ICD-10-CM

## 2017-03-27 DIAGNOSIS — G89.29 CHRONIC PAIN OF BOTH KNEES: Primary | ICD-10-CM

## 2017-03-27 DIAGNOSIS — R60.0 BILATERAL LOWER EXTREMITY EDEMA: ICD-10-CM

## 2017-03-27 DIAGNOSIS — Z96.652 STATUS POST TOTAL LEFT KNEE REPLACEMENT: ICD-10-CM

## 2017-03-27 DIAGNOSIS — M25.561 CHRONIC PAIN OF BOTH KNEES: Primary | ICD-10-CM

## 2017-03-27 DIAGNOSIS — R52 PAIN: Primary | ICD-10-CM

## 2017-03-27 DIAGNOSIS — Z96.651 S/P REVISION OF TOTAL KNEE, RIGHT: ICD-10-CM

## 2017-03-27 DIAGNOSIS — B35.1 ONYCHOMYCOSIS DUE TO DERMATOPHYTE: Primary | ICD-10-CM

## 2017-03-27 DIAGNOSIS — E66.01 MORBID OBESITY WITH BMI OF 50.0-59.9, ADULT: ICD-10-CM

## 2017-03-27 DIAGNOSIS — M25.562 PAIN IN BOTH KNEES, UNSPECIFIED CHRONICITY: ICD-10-CM

## 2017-03-27 DIAGNOSIS — M25.562 CHRONIC PAIN OF BOTH KNEES: Primary | ICD-10-CM

## 2017-03-27 PROCEDURE — 99499 UNLISTED E&M SERVICE: CPT | Mod: S$PBB,,, | Performed by: PODIATRIST

## 2017-03-27 PROCEDURE — 99999 PR PBB SHADOW E&M-EST. PATIENT-LVL II: CPT | Mod: PBBFAC,,, | Performed by: ORTHOPAEDIC SURGERY

## 2017-03-27 PROCEDURE — 99999 PR PBB SHADOW E&M-EST. PATIENT-LVL III: CPT | Mod: PBBFAC,,, | Performed by: PODIATRIST

## 2017-03-27 PROCEDURE — 99213 OFFICE O/P EST LOW 20 MIN: CPT | Mod: S$PBB,,, | Performed by: ORTHOPAEDIC SURGERY

## 2017-03-27 PROCEDURE — 99212 OFFICE O/P EST SF 10 MIN: CPT | Mod: PBBFAC,27,25 | Performed by: ORTHOPAEDIC SURGERY

## 2017-03-27 PROCEDURE — 11721 DEBRIDE NAIL 6 OR MORE: CPT | Mod: Q9,S$PBB,, | Performed by: PODIATRIST

## 2017-03-27 PROCEDURE — 73562 X-RAY EXAM OF KNEE 3: CPT | Mod: 26,50,, | Performed by: RADIOLOGY

## 2017-03-27 RX ORDER — CYCLOBENZAPRINE HCL 10 MG
10 TABLET ORAL 3 TIMES DAILY PRN
Qty: 60 TABLET | Refills: 2 | Status: SHIPPED | OUTPATIENT
Start: 2017-03-27 | End: 2017-04-06

## 2017-03-27 NOTE — MR AVS SNAPSHOT
[facility]  [address]  [facility] [address]  Phone: [phone]                  Alivia Thomas   3/27/2017 9:15 AM   Office Visit    Description:  Female : 1965   Provider:  Theodore Swan MD   Department:  [facility]           Diagnoses this Visit        Comments    Chronic pain of both knees    -  Primary     S/P revision of total knee, right         Status post total left knee replacement         Morbid obesity with BMI of 50.0-59.9, adult                To Do List           Future Appointments        Provider Department Dept Phone    2017 8:00 AM Virginia Sanchez [facility] - Pain Management [phone]    2017 8:00 AM MD Tom Maldonado - OB/GYN 5th Floor [phone]    2017 8:00 AM Clarisse Richardson MD [place] [address] [phone]      Goals (5 Years of Data)     None      Follow-Up and Disposition     Return in about 6 months (around 2017).      [facility]     [facility] On Call Nurse Care Line - 24/7 Assistance  Registered nurses in the [facility] Center provide clinical advisement, health education, appointment booking, and other advisory services.  Call for this free service at [phone].             Medications           Message regarding Medications     Verify the changes and/or additions to your medication regime listed below are the same as discussed with your clinician today.  If any of these changes or additions are incorrect, please notify your healthcare provider.             Verify that the below list of medications is an accurate representation of the medications you are currently taking.  If none reported, the list may be blank. If incorrect, please contact your healthcare provider. Carry this list with you in case of emergency.           Current Medications     albuterol 90 mcg/actuation inhaler Take 2 puffs every 4-6 hours  as needed for shortness of  "breath/wheezing    aspirin (ECOTRIN) 81 MG EC tablet Take 1 tablet by mouth every morning. prevents heart attacks and strokes    atorvastatin (LIPITOR) 20 MG tablet Take 1 tablet (20 mg total) by mouth once daily.    blood sugar diagnostic Strp Freestyle light strips and lancets    celecoxib (CELEBREX) 200 MG capsule One capsule daily    econazole nitrate 1 % cream Apply topically 2 (two) times daily.    ergocalciferol (ERGOCALCIFEROL) 50,000 unit Cap Take 1 capsule (50,000 Units total) by mouth twice a week.    insulin detemir (LEVEMIR FLEXPEN) 100 unit/mL (3 mL) SubQ InPn pen Inject 20 Units into the skin every evening.    insulin lispro (HUMALOG) 100 unit/mL injection Inject 11 Units into the skin 3 (three) times daily before meals. Glucose        Intervention  (units to add in addition to meal time Humalog base dose of 11 Units)                        0-80    orange juice             0 units     151 - 200       +2 Units     201 - 250       +4 Units     251 - 300       +6 Units     301 - 350        +8 Units     351 - 400       +10 Units     > 400           Call MD    lisinopril (PRINIVIL,ZESTRIL) 2.5 MG tablet One daily for proteinuria.    metformin (GLUCOPHAGE-XR) 500 MG 24 hr tablet Take 2 tablets (1,000 mg total) by mouth 2 (two) times daily.    omeprazole (PRILOSEC) 20 MG capsule Take 1 capsule (20 mg total) by mouth every morning.    oxycodone-acetaminophen (PERCOCET)  mg per tablet Take 1 tablet by mouth every 6 (six) hours as needed for Pain.    vitamin D 185 MG Tab Take 5,000 mg by mouth once daily.    fluconazole (DIFLUCAN) 150 MG Tab One daily for 3 days           Clinical Reference Information           Your Vitals Were     Height Weight Last Period BMI       5' 5" (1.651 m) 163.3 kg (360 lb) 03/04/2017 (Exact Date) 59.91 kg/m2       Allergies as of 3/27/2017     No Known Allergies      Immunizations Administered on Date of Encounter - 3/27/2017     None      [facility] Sign-Up     " Activating your [facility] account is as easy as 1-2-3!     1) Visit [email or web address], select Sign Up Now, enter this activation code and your date of birth, then select Next.  [email or web address]  Expires: 4/1/2017  9:36 AM      2) Create a username and password to use when you visit [facility] in the future and select a security question in case you lose your password and select Next.    3) Enter your e-mail address and click Sign Up!    Additional Information  If you have questions, please e-mail [email or web address] or call [phone] to talk to our [facility] staff. Remember, [facility] is NOT to be used for urgent needs. For medical emergencies, dial 911.         Language Assistance Services     ATTENTION: Language assistance services are available, free of charge. Please call [phone].      ATENCIÓN: Si habla mira, tiene a abad disposición servicios gratuitos de asistencia lingüística. Llame al [phone].     ASHLYN Ý: N?u b?n nói Ti?ng Vi?t, có các d?ch v? h? tr? ngôn ng? mi?n phí dành cho b?n. G?i s? [phone].         [facility] complies with applicable Federal civil rights laws and does not discriminate on the basis of race, color, national origin, age, disability, or sex.

## 2017-03-27 NOTE — PROGRESS NOTES
CC:     Foot exam     HPI:   The patient is a 51 y.o.  female  who presents for a diabetic foot exam.   Patient reports + presence of abnormal sensation to the feet, just sometimes.     Patient has no history of wounds on the feet.   Hx of foot surgery: none.   Shoe gear: athletic type  This patient has documented high risk feet requiring routine maintenance secondary to diabetes.  Main concern today is toenail debridement.        Primary care doctor is: Clarisse Richardson MD  Patient last saw primary care doctor on:    Upcoming appointment 5/5/17          Past Medical History:   Diagnosis Date    Asthma     Diabetes mellitus type II     DJD (degenerative joint disease) of knee 6/19/2014    Hyperlipidemia     Morbid obesity     Sleep apnea          Current Outpatient Prescriptions on File Prior to Visit   Medication Sig Dispense Refill    albuterol 90 mcg/actuation inhaler Take 2 puffs every 4-6 hours  as needed for shortness of breath/wheezing 1 Inhaler 6    aspirin (ECOTRIN) 81 MG EC tablet Take 1 tablet by mouth every morning. prevents heart attacks and strokes      atorvastatin (LIPITOR) 20 MG tablet Take 1 tablet (20 mg total) by mouth once daily. 30 tablet 6    blood sugar diagnostic Strp Freestyle light strips and lancets 100 each 6    celecoxib (CELEBREX) 200 MG capsule One capsule daily 30 capsule 2    econazole nitrate 1 % cream Apply topically 2 (two) times daily. 30 g 2    ergocalciferol (ERGOCALCIFEROL) 50,000 unit Cap Take 1 capsule (50,000 Units total) by mouth twice a week. 24 capsule 0    fluconazole (DIFLUCAN) 150 MG Tab One daily for 3 days 3 tablet 3    insulin detemir (LEVEMIR FLEXPEN) 100 unit/mL (3 mL) SubQ InPn pen Inject 20 Units into the skin every evening. 1 Box 11    insulin lispro (HUMALOG) 100 unit/mL injection Inject 11 Units into the skin 3 (three) times daily before meals. Glucose        Intervention  (units to add in addition to meal time Humalog base dose of 11  Units)                        0-80    orange juice             0 units     151 - 200       +2 Units     201 - 250       +4 Units     251 - 300       +6 Units     301 - 350        +8 Units     351 - 400       +10 Units     > 400           Call MD 3 mL 11    lisinopril (PRINIVIL,ZESTRIL) 2.5 MG tablet One daily for proteinuria. 30 tablet 6    metformin (GLUCOPHAGE-XR) 500 MG 24 hr tablet Take 2 tablets (1,000 mg total) by mouth 2 (two) times daily. 360 tablet 6    omeprazole (PRILOSEC) 20 MG capsule Take 1 capsule (20 mg total) by mouth every morning. 30 capsule 6    oxycodone-acetaminophen (PERCOCET)  mg per tablet Take 1 tablet by mouth every 6 (six) hours as needed for Pain. 120 tablet 0    vitamin D 185 MG Tab Take 5,000 mg by mouth once daily.       No current facility-administered medications on file prior to visit.        Review of patient's allergies indicates:  No Known Allergies          ROS:  General ROS: negative  Respiratory ROS: no cough, shortness of breath, or wheezing  Cardiovascular ROS: no chest pain or dyspnea on exertion  Musculoskeletal ROS: negative  Neurological ROS:   negative for - gait disturbance, numbness/tingling, seizures or tremors  Dermatological ROS: negative          LAST HbA1c:   Hemoglobin A1C   Date Value Ref Range Status   12/15/2016 10.6 (H) 4.5 - 6.2 % Final     Comment:     According to ADA guidelines, hemoglobin A1C <7.0% represents  optimal control in non-pregnant diabetic patients.  Different  metrics may apply to specific populations.   Standards of Medical Care in Diabetes - 2016.  For the purpose of screening for the presence of diabetes:  <5.7%     Consistent with the absence of diabetes  5.7-6.4%  Consistent with increasing risk for diabetes   (prediabetes)  >or=6.5%  Consistent with diabetes  Currently no consensus exists for use of hemoglobin A1C  for diagnosis of diabetes for children.     06/17/2016 9.0 (H) 4.5 - 6.2 % Final   01/25/2016 9.6 (H)  "4.5 - 6.2 % Final         EXAM:   Vitals:    03/27/17 0759   BP: 133/80   Pulse: 77   Weight: (!) 164.2 kg (362 lb)   Height: 5' 6" (1.676 m)       General: Pt. is well-developed, well-nourished, appears stated age, in no acute distress, alert and oriented x 3.      Vascular: Dorsalis pedis and posterior tibial pulses are 2/4 bilaterally. 3 secs capillary refill time and toes are warm to touch.  There is reduced hair growth on the feet.  There is 1+ edema.  no varicosities.      Neurological:  Light touch, proprioception, and sharp/dull sensation are all intact bilaterally. Protective threshold with the [place] monofilament is diminished bilaterally.     Dermatological:  The skin is intact, warm.  The toenails  1-5 L and 1-5 R  are greenish- yellow, long by 5-6mm and thick by 2-3mm with subungual debris  .  There is no presence of hyperkeratotic lesions.  There are no open wounds. There is no ecchymoses.  no erythema noted.   Interdigital spaces are intact, no fissures Skin atrophic, thin, dry and mildly hyperpigmented.     Musculoskeletal:  Muscle strength is 5/5 in all groups bilaterally.  Metatarsophalangeal, subtalar, and ankle range of motion are intact without crepitus.           Assessment / Plan:  Shoe inspection. Diabetic Foot Education. Patient reminded of the importance of good nutrition and blood sugar control to help prevent podiatric complications of diabetes. Patient instructed on proper foot hygiene. We discussed wearing proper shoe gear, daily foot inspections, never walking without protective shoe gear, never putting sharp instruments to feet, and routine diabetic foot exam and care every 3-6 months to prevent complications.      Onychomycosis due to dermatophyte  -     Foot Care    Bilateral lower extremity edema  -     Foot Care    Type II diabetes mellitus with neurological manifestations  -     Foot Care    See Foot Care report under the Procedures tab.  This patient has documented " high risk feet requiring routine maintenance secondary to diabetes     Return in about 3 months (around 6/27/2017) for foot care, or sooner if concerned.

## 2017-03-27 NOTE — TELEPHONE ENCOUNTER
Alivia Thomas reminded of appointment on 3/28/17 with Dr. BRAD Garcia w/time and location. Notified of need for xray before OV w/date, time, and location of appts.  Patient verbalized understanding.

## 2017-03-27 NOTE — PROGRESS NOTES
"Subjective:      Patient ID: Alivia Thomas is a 51 y.o. female.    Chief Complaint: No chief complaint on file.    HPI  Alivia Thomas has bilateral knee pain. Left somewhat worse than right.  She fell about a week ago while here at [email or web address] parking and landed on her knees. The pain has worsened somewhat since she fell. The pain is located in the anterior aspect of the knee.  There is not radiation.   There is not associated stiffness.   There is not catching and locking. The pain is described as achy. The pain is aggravated by prolonged staning or sittng.  It is alleviated by rest.  There is associated back pain.  Her history, medications and problem list were reviewed. She is also having left shoulder pain    Review of Systems   Constitution: Negative for chills, fever and night sweats.   HENT: Negative for headaches and hearing loss.    Eyes: Negative for blurred vision and double vision.   Cardiovascular: Negative for chest pain, claudication and leg swelling.   Respiratory: Negative for shortness of breath.    Endocrine: Negative for polydipsia, polyphagia and polyuria.   Hematologic/Lymphatic: Negative for adenopathy and bleeding problem. Does not bruise/bleed easily.   Skin: Negative for poor wound healing.   Musculoskeletal: Positive for joint pain.   Gastrointestinal: Negative for diarrhea and heartburn.   Genitourinary: Negative for bladder incontinence.   Neurological: Negative for focal weakness, numbness, paresthesias and sensory change.   Psychiatric/Behavioral: The patient is not nervous/anxious.    Allergic/Immunologic: Negative for persistent infections.         Objective:      Body mass index is 59.91 kg/(m^2).  Vitals:    03/27/17 0858   Weight: (!) 163.3 kg (360 lb)   Height: 5' 5" (1.651 m)           General    Constitutional: She is oriented to person, place, and time.   obese   HENT:   Head: Normocephalic and atraumatic.   Eyes: EOM are normal.   Cardiovascular: Normal rate and regular " rhythm.    Pulmonary/Chest: Effort normal.   Neurological: She is alert and oriented to person, place, and time.   Psychiatric: She has a normal mood and affect.     General Musculoskeletal Exam   Gait: normal       Right Knee Exam     Inspection   Erythema: absent  Scars: present  Swelling: absent  Effusion: effusion  Deformity: deformity  Bruising: absent    Tenderness   The patient is tender to palpation of the patella and patellar tendon.    Range of Motion   Extension: 0   Flexion: 120     Tests   Ligament Examination Lachman: normal (-1 to 2mm)   MCL - Valgus: normal (0 to 2mm)  LCL - Varus: normal  Patella   Passive Patellar Tilt: lateral tilt    Other   Sensation: normal    Left Knee Exam     Inspection   Erythema: absent  Scars: present  Swelling: absent  Effusion: absent  Deformity: deformity  Bruising: absent    Tenderness   The patient tender to palpation of the patella and patellar tendon.    Range of Motion   Extension: 0   Flexion: 120     Tests   Stability Lachman: normal (-1 to 2mm)   MCL - Valgus: normal (0 to 2mm)  LCL - Varus: normal (0 to 2mm)  Patella   Passive Patellar Tilt: lateral tilt  Patellar Grind: positive    Other   Sensation: normal    Muscle Strength   Right Lower Extremity   Hip Abduction: 5/5   Quadriceps:  5/5   Hamstrin/5   Left Lower Extremity   Hip Abduction: 5/5   Quadriceps:  5/5   Hamstrin/5     Reflexes     Left Side  Quadriceps:  2+    Right Side   Quadriceps:  2+    Vascular Exam     Right Pulses  Dorsalis Pedis:      2+          Left Pulses  Dorsalis Pedis:      2+          Edema  Right Lower Leg: absent  Left Lower Leg: absent    Radiographs taken today were reviewed by me.  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  TNo sognificant change from previous        Assessment:       Encounter Diagnoses   Name Primary?    Chronic pain of both knees Yes    S/P revision of total knee, right     Status post total left knee replacement     Morbid  obesity with BMI of 50.0-59.9, adult           Plan:       Diagnoses and all orders for this visit:    Chronic pain of both knees    S/P revision of total knee, right    Status post total left knee replacement    Morbid obesity with BMI of 50.0-59.9, adult      Her shoulder is the main issue, and she wants to see Dr. Garcia for this.  Once this is resolved we will try a course of PT for her knees to strengthen quads. She is attempting to lose weight.    F/U in 6 months with xrays of knees

## 2017-03-27 NOTE — PROCEDURES
Routine Foot Care  Date/Time: 3/27/2017 8:27 AM  Performed by: JOSÉ ANTONIO WILLIS  Authorized by: JOSÉ ANTONIO WILLIS     Consent Done?:  Yes (Verbal)    Nail Care Type:  Debride  Location(s): All  (Left 1st Toe, Left 3rd Toe, Left 2nd Toe, Left 4th Toe, Left 5th Toe, Right 1st Toe, Right 2nd Toe, Right 3rd Toe, Right 4th Toe and Right 5th Toe)  Patient tolerance:  Patient tolerated the procedure well with no immediate complications

## 2017-03-27 NOTE — PATIENT INSTRUCTIONS
Your A1c:    Hemoglobin A1C   Date Value Ref Range Status   12/15/2016 10.6 (H) 4.5 - 6.2 % Final     Comment:     According to ADA guidelines, hemoglobin A1C <7.0% represents  optimal control in non-pregnant diabetic patients.  Different  metrics may apply to specific populations.   Standards of Medical Care in Diabetes - 2016.  For the purpose of screening for the presence of diabetes:  <5.7%     Consistent with the absence of diabetes  5.7-6.4%  Consistent with increasing risk for diabetes   (prediabetes)  >or=6.5%  Consistent with diabetes  Currently no consensus exists for use of hemoglobin A1C  for diagnosis of diabetes for children.     06/17/2016 9.0 (H) 4.5 - 6.2 % Final   01/25/2016 9.6 (H) 4.5 - 6.2 % Final       How to Check Your Feet    Below are tips to help you look for foot problems. Try to check your feet at the same time each day, such as when you get out of bed in the morning.    · Check the top of each foot. The tops of toes, back of the heel, and outer edge of the foot can get a lot of rubbing from poor-fitting shoes.    · Check the bottom of each foot. Daily wear and tear often leads to problems at pressure spots.    · Check the toes and nails. Fungal infections often occur between toes. Toenail problems can also be a sign of fungal infections or lead to breaks in the skin.    · Check your shoes, too. Loose objects inside a shoe can injure the foot. Use your hand to feel inside your shoes for things like alie, loose stitching, or rough areas that could irritate your skin.        Diabetic Foot Care    Diabetes can lead to a number of different foot complications. Fortunately, most of these complications can be prevented with a little extra foot care. If diabetes is not well controlled, the high blood sugar can cause damage to blood vessels and result in poor circulation to the foot. When the skin does not get enough blood flow, it becomes prone to pressure sores and ulcers, which heal  slowly.  High blood sugar can also damage nerves, interfering with the ability to feel pain and pressure. When you cant feel your foot normally, it is easy to injure your skin, bones and joints without knowing it. For these reasons diabetes increases the risk of fungal infections, bunions and ulcers. Deep ulcers can lead to bone infection. Gangrene is the most serious foot complication of diabetes. It usually occurs on the tips of the toes as blacked areas of skin. The black area is dead tissue. In severe cases, gangrene spreads to involve the entire toe, other toes and the entire foot. Foot or toe amputation may be required. Good foot care and blood sugar control can prevent this.    Home Care  1. Wear comfortable, proper fitting shoes.  2. Wash your feet daily with warm water and mild soap.  3. After drying, apply a moisturizing cream or lotion.  4. Check your feet daily for skin breaks, blisters, swelling, or redness. Look between your toes also.  5. Wear cotton socks and change them every day.  6. Trim toe nails carefully and do not cut your cuticles.  7. Strive to keep your blood sugar under control with a combination of medicines, diet and activity.  8. If you smoke and have diabetes, it is very important that you stop. Smoking reduces blood flow to your foot.  9. Avoid activities that increase your risk of foot injury:  · Do not walk barefoot.  · Do not use heating pads or hot water bottles on your feet.  · Do not put your foot in a hot tub without first checking the temperature with your hand.  10) Schedule yearly foot exams.    Follow Up  with your doctor or as advised by our staff. Report any cut, puncture, scrape, other injury, blister, ingrown toenail or ulcer on your foot.    Get Prompt Medical Attention  if any of the following occur:  -- Open ulcer with pus draining from the wound  -- Increasing foot or leg pain  -- New areas of redness or swelling or tender areas of the foot    © 7849-3356 [employer]. [address] [place] [address], [place] [address]. All rights reserved. This information is not intended as a substitute for professional medical care. Always follow your healthcare professional's instructions.

## 2017-03-28 ENCOUNTER — OFFICE VISIT (OUTPATIENT)
Dept: ORTHOPEDICS | Facility: CLINIC | Age: 52
End: 2017-03-28
Payer: MEDICARE

## 2017-03-28 ENCOUNTER — HOSPITAL ENCOUNTER (OUTPATIENT)
Dept: RADIOLOGY | Facility: OTHER | Age: 52
Discharge: HOME | End: 2017-03-28
Attending: ORTHOPAEDIC SURGERY | Admitting: ORTHOPAEDIC SURGERY
Payer: MEDICARE

## 2017-03-28 VITALS
WEIGHT: 293 LBS | HEART RATE: 91 BPM | HEIGHT: 65 IN | BODY MASS INDEX: 48.82 KG/M2 | RESPIRATION RATE: 18 BRPM | SYSTOLIC BLOOD PRESSURE: 170 MMHG | DIASTOLIC BLOOD PRESSURE: 94 MMHG

## 2017-03-28 DIAGNOSIS — M25.512 CHRONIC LEFT SHOULDER PAIN: ICD-10-CM

## 2017-03-28 DIAGNOSIS — G89.29 CHRONIC LEFT SHOULDER PAIN: ICD-10-CM

## 2017-03-28 DIAGNOSIS — R52 PAIN: ICD-10-CM

## 2017-03-28 DIAGNOSIS — M75.22 BICEPS TENDINITIS, LEFT: Primary | ICD-10-CM

## 2017-03-28 PROCEDURE — 99213 OFFICE O/P EST LOW 20 MIN: CPT | Mod: 25,S$PBB,, | Performed by: ORTHOPAEDIC SURGERY

## 2017-03-28 PROCEDURE — 20550 NJX 1 TENDON SHEATH/LIGAMENT: CPT | Mod: PBBFAC | Performed by: ORTHOPAEDIC SURGERY

## 2017-03-28 PROCEDURE — 99214 OFFICE O/P EST MOD 30 MIN: CPT | Mod: PBBFAC | Performed by: ORTHOPAEDIC SURGERY

## 2017-03-28 PROCEDURE — 73030 X-RAY EXAM OF SHOULDER: CPT | Mod: 26,LT,, | Performed by: RADIOLOGY

## 2017-03-28 PROCEDURE — 99999 PR PBB SHADOW E&M-EST. PATIENT-LVL IV: CPT | Mod: PBBFAC,,, | Performed by: ORTHOPAEDIC SURGERY

## 2017-03-28 RX ADMIN — TRIAMCINOLONE HEXACETONIDE 40 MG: 20 INJECTION, SUSPENSION INTRA-ARTICULAR; PARENTERAL at 11:03

## 2017-03-28 NOTE — MR AVS SNAPSHOT
[facility]  [address], [address]  [facility] [address]  Phone: [phone]                  Alivia Thomas   3/28/2017 10:30 AM   Office Visit    Description:  Female : 1965   Provider:  Velma Garcia MD   Department:  [facility]           Reason for Visit     Left Shoulder - Pain           Diagnoses this Visit        Comments    Biceps tendinitis, left    -  Primary     Chronic left shoulder pain                To Do List           Future Appointments        Provider Department Dept Phone    2017 8:00 AM EMILY [facility] [phone]    2017 8:00 AM MD Tom Maldonado - OB/GYN 5th Floor [phone]    2017 8:00 AM Clarisse Richardson MD [facility] [address] [phone]    2017 8:00 AM Velma Garcia MD [facility] [phone]    2017 8:00 AM DASHAWN [facility] - Podiatry [phone]      Goals (5 Years of Data)     None      [facility] On Call     [facility] On Call Nurse Care Line - 24/7 Assistance  Registered nurses in the [facility] On Call Center provide clinical advisement, health education, appointment booking, and other advisory services.  Call for this free service at [phone].             Medications           Message regarding Medications     Verify the changes and/or additions to your medication regime listed below are the same as discussed with your clinician today.  If any of these changes or additions are incorrect, please notify your healthcare provider.             Verify that the below list of medications is an accurate representation of the medications you are currently taking.  If none reported, the list may be blank. If incorrect, please contact your healthcare provider. Carry this list with you in case of emergency.           Current Medications     albuterol 90 mcg/actuation inhaler Take 2 puffs every 4-6 hours  as needed for  "shortness of breath/wheezing    aspirin (ECOTRIN) 81 MG EC tablet Take 1 tablet by mouth every morning. prevents heart attacks and strokes    atorvastatin (LIPITOR) 20 MG tablet Take 1 tablet (20 mg total) by mouth once daily.    blood sugar diagnostic Strp Freestyle light strips and lancets    celecoxib (CELEBREX) 200 MG capsule One capsule daily    cyclobenzaprine (FLEXERIL) 10 MG tablet Take 1 tablet (10 mg total) by mouth 3 (three) times daily as needed.    econazole nitrate 1 % cream Apply topically 2 (two) times daily.    ergocalciferol (ERGOCALCIFEROL) 50,000 unit Cap Take 1 capsule (50,000 Units total) by mouth twice a week.    fluconazole (DIFLUCAN) 150 MG Tab One daily for 3 days    insulin detemir (LEVEMIR FLEXPEN) 100 unit/mL (3 mL) SubQ InPn pen Inject 20 Units into the skin every evening.    insulin lispro (HUMALOG) 100 unit/mL injection Inject 11 Units into the skin 3 (three) times daily before meals. Glucose        Intervention  (units to add in addition to meal time Humalog base dose of 11 Units)                        0-80    orange juice             0 units     151 - 200       +2 Units     201 - 250       +4 Units     251 - 300       +6 Units     301 - 350        +8 Units     351 - 400       +10 Units     > 400           Call MD    lisinopril (PRINIVIL,ZESTRIL) 2.5 MG tablet One daily for proteinuria.    metformin (GLUCOPHAGE-XR) 500 MG 24 hr tablet Take 2 tablets (1,000 mg total) by mouth 2 (two) times daily.    omeprazole (PRILOSEC) 20 MG capsule Take 1 capsule (20 mg total) by mouth every morning.    oxycodone-acetaminophen (PERCOCET)  mg per tablet Take 1 tablet by mouth every 6 (six) hours as needed for Pain.    vitamin D 185 MG Tab Take 5,000 mg by mouth once daily.           Clinical Reference Information           Your Vitals Were     BP Pulse Resp Height Weight Last Period    170/94 91 18 5' 5" (1.651 m) 163.3 kg (360 lb) 03/04/2017 (Exact Date)    BMI                59.91 " kg/m2          Blood Pressure          Most Recent Value    BP  (!)  170/94      Allergies as of 3/28/2017     No Known Allergies      Immunizations Administered on Date of Encounter - 3/28/2017     None      Orders Placed During Today's Visit      Normal Orders This Visit    Ambulatory Referral to Physical/Occupational Therapy     Tendon Sheath       [facility] Sign-Up     Activating your [facility] account is as easy as 1-2-3!     1) Visit [email or web address], select Sign Up Now, enter this activation code and your date of birth, then select Next.  [email or web address]  Expires: 4/1/2017  9:36 AM      2) Create a username and password to use when you visit [facility] in the future and select a security question in case you lose your password and select Next.    3) Enter your e-mail address and click Sign Up!    Additional Information  If you have questions, please e-mail [email or web address] or call [phone] to talk to our [facility] staff. Remember, [facility] is NOT to be used for urgent needs. For medical emergencies, dial 911.         Language Assistance Services     ATTENTION: Language assistance services are available, free of charge. Please call [phone].      ATENCIÓN: Si habla mira, tiene a abad disposición servicios gratuitos de asistencia lingüística. Llame al [phone].     CHÚ Ý: N?u b?n nói Ti?ng Vi?t, có các d?ch v? h? tr? ngôn ng? mi?n phí dành cho b?n. G?i s? [phone].         [facility] complies with applicable Federal civil rights laws and does not discriminate on the basis of race, color, national origin, age, disability, or sex.

## 2017-03-28 NOTE — PROGRESS NOTES
I have personally taken the history and examined the patient. I agree with the Hand Surgery PA's note. The plan will be injection and PT.m Pt has ant shoulder pain , hurts at night as well. Plan for rehab shoulder and injection today.

## 2017-03-28 NOTE — PROGRESS NOTES
This office note has been dictated.   Dictation Confirmation Code: [id number]  Anisa Dietz PA-C  03/28/2017  11:49 AM  Supervising Physician:  MD Alivia Clancy was seen today for pain.    Diagnoses and all orders for this visit:    Biceps tendinitis, left  -     Ambulatory Referral to Physical/Occupational Therapy  -     Tendon Sheath    Chronic left shoulder pain  -     Ambulatory Referral to Physical/Occupational Therapy    PROCEDURE NOTE:  I have explained the risks, benefits, and alternatives of the procedure in detail.  The patient voices understanding and all questions have been answered.  The patient agrees to proceed as planned. After a sterile prep of the skin in the normal the left shoulder is injected from the posterior approach using a 22 gauge needle with a combination of 8cc 1% lidocaine and 80 mg of kenalog. The patient is cautioned and immediate relief of pain is secondary to the local anesthetic and will be temporary.  After the anesthetic wears off there may be a increase in pain that may last for a few hours or a few days and they should use ice to help alleviate this flair up of pain.

## 2017-03-28 NOTE — PROCEDURES
L bicep tendonTendon Sheath  Date/Time: 3/28/2017 11:22 AM  Performed by: RADHA ROWELL  Authorized by: RADHA ROWELL     Consent Done?: Yes (Verbal)  Timeout: prior to procedure the correct patient, procedure, and site was verified    Indications:  Pain  Timeout: prior to procedure the correct patient, procedure, and site was verified    Location:  Shoulder  Needle size:  22 G  Medications:  40 mg triamcinolone hexacetonide 20 mg/mL

## 2017-03-29 NOTE — PROGRESS NOTES
HISTORY OF PRESENT ILLNESS:  Ms. Shearer presents today for a new consultation   problem of her left shoulder pain.  She reports this has been going on for   several months.  She feels the pain and a popping when she raises her arm.  She   has not done therapy or ever had injections in the past.  She denies nausea,   vomiting, fever or chills.  She reports that she is unable to sleep on the left   shoulder.  She is having difficulty raising her arm.  No trauma reported at all   in the shoulder.  She reports her hands are doing fine.  She reports her pain is   8/10.  She denies paresthesias or neck pain today.    Past Medical History:   Diagnosis Date    Asthma     Diabetes mellitus type II     DJD (degenerative joint disease) of knee 6/19/2014    Hyperlipidemia     Morbid obesity     Sleep apnea        Past Surgical History:   Procedure Laterality Date    CARPAL TUNNEL RELEASE      CARPAL TUNNEL RELEASE  1980s    left    CARPAL TUNNEL RELEASE  2012    right    JOINT REPLACEMENT Bilateral     with 2 revisions on rt    KNEE SURGERY  3/2010    orthroscope    KNEE SURGERY  6-19-14    left TKR       Social History     Social History    Marital status: Single     Spouse name: N/A    Number of children: N/A    Years of education: N/A     Occupational History    Not on file.     Social History Main Topics    Smoking status: Never Smoker    Smokeless tobacco: Never Used    Alcohol use Yes      Comment: occasionally     Drug use: No    Sexual activity: Yes     Partners: Female     Birth control/ protection: None     Other Topics Concern    Not on file     Social History Narrative    Disabled. The patient is the youngest of 6 siblings. Single. Lives with single-sex partner.                        Current Outpatient Prescriptions on File Prior to Visit   Medication Sig Dispense Refill    albuterol 90 mcg/actuation inhaler Take 2 puffs every 4-6 hours  as needed for shortness of breath/wheezing 1 Inhaler 6     aspirin (ECOTRIN) 81 MG EC tablet Take 1 tablet by mouth every morning. prevents heart attacks and strokes      atorvastatin (LIPITOR) 20 MG tablet Take 1 tablet (20 mg total) by mouth once daily. 30 tablet 6    blood sugar diagnostic Strp Freestyle light strips and lancets 100 each 6    celecoxib (CELEBREX) 200 MG capsule One capsule daily 30 capsule 2    cyclobenzaprine (FLEXERIL) 10 MG tablet Take 1 tablet (10 mg total) by mouth 3 (three) times daily as needed. 60 tablet 2    econazole nitrate 1 % cream Apply topically 2 (two) times daily. 30 g 2    ergocalciferol (ERGOCALCIFEROL) 50,000 unit Cap Take 1 capsule (50,000 Units total) by mouth twice a week. 24 capsule 0    fluconazole (DIFLUCAN) 150 MG Tab One daily for 3 days 3 tablet 3    insulin detemir (LEVEMIR FLEXPEN) 100 unit/mL (3 mL) SubQ InPn pen Inject 20 Units into the skin every evening. 1 Box 11    insulin lispro (HUMALOG) 100 unit/mL injection Inject 11 Units into the skin 3 (three) times daily before meals. Glucose        Intervention  (units to add in addition to meal time Humalog base dose of 11 Units)                        0-80    orange juice             0 units     151 - 200       +2 Units     201 - 250       +4 Units     251 - 300       +6 Units     301 - 350        +8 Units     351 - 400       +10 Units     > 400           Call MD 3 mL 11    lisinopril (PRINIVIL,ZESTRIL) 2.5 MG tablet One daily for proteinuria. 30 tablet 6    metformin (GLUCOPHAGE-XR) 500 MG 24 hr tablet Take 2 tablets (1,000 mg total) by mouth 2 (two) times daily. 360 tablet 6    omeprazole (PRILOSEC) 20 MG capsule Take 1 capsule (20 mg total) by mouth every morning. 30 capsule 6    oxycodone-acetaminophen (PERCOCET)  mg per tablet Take 1 tablet by mouth every 6 (six) hours as needed for Pain. 120 tablet 0    vitamin D 185 MG Tab Take 5,000 mg by mouth once daily.       No current facility-administered medications on file prior to visit.   "      Review of patient's allergies indicates:  No Known Allergies    Review of Systems:  Constitutional: no fever or chills  ENT: no nasal congestion or sore throat  Respiratory: no cough or shortness of breath  Cardiovascular: no chest pain or palpitations  Gastrointestinal: no nausea or vomiting  Genitourinary: no hematuria or dysuria  Integument/Breast: no rash or pruritis  Hematologic/Lymphatic: no easy bruising or lymphadenopathy  Musculoskeletal: see HPI  Neurological: no seizures or tremors  Behavioral/Psych: no auditory or visual hallucinations      PHYSICAL EXAM    Vitals:    03/28/17 1055   BP: (!) 170/94   Pulse: 91   Resp: 18   Weight: (!) 163.3 kg (360 lb)   Height: 5' 5" (1.651 m)   PainSc:   8   PainLoc: Shoulder       GENERAL:  Well developed, well nourished, in no acute distress.  CARDIOVASCULAR:  Regular rate and rhythm.  LUNGS:  Respirations are equal and unlabored.  BEHAVIORAL AND PSYCHIATRIC:  Mood and affect are appropriate.  NEUROLOGIC:  No tremor.  HEENT:  Normocephalic and atraumatic.  MUSCULOSKELETAL:  Upper Extremity Exam:  Distally neurovascularly intact.  AIN   and PIN nerves are intact.  Sensation over the radial, ulnar and median nerves   are equivocal.  She has full range of motion of the shoulder except she has pain   at about 160 degrees forward flexion.  She has pain with internal rotation past   60 degrees.  She has full abduction and full external rotation without pain.    She does have significant tenderness at the biceps tenderness anteriorly and   mild tenderness at the AC joint.    X-RAYS:  No fractures or dislocations.  No evidence of AC joint or glenohumeral   arthritis.    ASSESSMENT:  Biceps tendinitis, left and left shoulder pain.    PLAN:  I discussed with MsJuan A  Thong that we are going to recommend conservative   treatment options with therapy for parascapular stabilization.  We can do a   shoulder injection to help her pain.  We did a subacromial injection for her.  "   Please see computer entry for procedure note.  In the future, she may require a   biceps tendon injection, but we will try this first and see if this helps   alleviate her symptoms.  The patient expressed understanding.    DICTATED BY:  Sully GOTTI  dd: 03/28/2017 11:52:39 (CDT)  td: 03/29/2017 09:52:36 (CDT)  Doc ID   #[id number]  Job ID #[id number]    CC:

## 2017-04-04 ENCOUNTER — CLINICAL SUPPORT (OUTPATIENT)
Dept: REHABILITATION | Facility: HOSPITAL | Age: 52
End: 2017-04-04
Attending: ORTHOPAEDIC SURGERY
Payer: MEDICARE

## 2017-04-04 DIAGNOSIS — M79.602 LEFT ARM PAIN: ICD-10-CM

## 2017-04-04 DIAGNOSIS — M25.512 ACUTE PAIN OF LEFT SHOULDER: ICD-10-CM

## 2017-04-04 PROCEDURE — 97165 OT EVAL LOW COMPLEX 30 MIN: CPT | Mod: PO

## 2017-04-04 PROCEDURE — G8984 CARRY CURRENT STATUS: HCPCS | Mod: CJ,PO

## 2017-04-04 PROCEDURE — G8985 CARRY GOAL STATUS: HCPCS | Mod: CI,PO

## 2017-04-04 PROCEDURE — 97110 THERAPEUTIC EXERCISES: CPT | Mod: PO

## 2017-04-04 NOTE — PLAN OF CARE
Occupational Therapy Shoulder Evaluation    Name: Alivia Marie Cyr  Clinic Number: [id number]  Diagnosis:   Encounter Diagnosis   Name Primary?    Left arm pain      Physician: Velma Garcia, *  Treatment Orders: Eval and Treat     Past Medical History:   Diagnosis Date    Asthma     Diabetes mellitus type II     DJD (degenerative joint disease) of knee 6/19/2014    Hyperlipidemia     Morbid obesity     Sleep apnea      Current Outpatient Prescriptions   Medication Sig    albuterol 90 mcg/actuation inhaler Take 2 puffs every 4-6 hours  as needed for shortness of breath/wheezing    aspirin (ECOTRIN) 81 MG EC tablet Take 1 tablet by mouth every morning. prevents heart attacks and strokes    atorvastatin (LIPITOR) 20 MG tablet Take 1 tablet (20 mg total) by mouth once daily.    blood sugar diagnostic Strp Freestyle light strips and lancets    celecoxib (CELEBREX) 200 MG capsule One capsule daily    cyclobenzaprine (FLEXERIL) 10 MG tablet Take 1 tablet (10 mg total) by mouth 3 (three) times daily as needed.    econazole nitrate 1 % cream Apply topically 2 (two) times daily.    ergocalciferol (ERGOCALCIFEROL) 50,000 unit Cap Take 1 capsule (50,000 Units total) by mouth twice a week.    fluconazole (DIFLUCAN) 150 MG Tab One daily for 3 days    insulin detemir (LEVEMIR FLEXPEN) 100 unit/mL (3 mL) SubQ InPn pen Inject 20 Units into the skin every evening.    insulin lispro (HUMALOG) 100 unit/mL injection Inject 11 Units into the skin 3 (three) times daily before meals. Glucose        Intervention  (units to add in addition to meal time Humalog base dose of 11 Units)                        0-80    orange juice             0 units     151 - 200       +2 Units     201 - 250       +4 Units     251 - 300       +6 Units     301 - 350        +8 Units     351 - 400       +10 Units     > 400           Call MD    lisinopril (PRINIVIL,ZESTRIL) 2.5 MG tablet One daily for proteinuria.    metformin  (GLUCOPHAGE-XR) 500 MG 24 hr tablet Take 2 tablets (1,000 mg total) by mouth 2 (two) times daily.    omeprazole (PRILOSEC) 20 MG capsule Take 1 capsule (20 mg total) by mouth every morning.    oxycodone-acetaminophen (PERCOCET)  mg per tablet Take 1 tablet by mouth every 6 (six) hours as needed for Pain.    vitamin D 185 MG Tab Take 5,000 mg by mouth once daily.     No current facility-administered medications for this visit.      Review of patient's allergies indicates:  No Known Allergies  Precautions: Standard     Evaluation Date: 4/4/2017        Subjective     Onset/SALIMA: gradual    Primary concern/ Chief complaints:    Alivia is a 51 y.o. female that presents to clinic secondary to left shoulder pain which happened about 3 months ago. She does not recall anything in particular that caused her pain to start, she realized she was having lots of pain when she was unable to sleep on her left side.  X-ray and MRI was taken and revealed nothing significant. Previous treatment included corticosteriod injection a week ago on 3/28. She reports relief with the shot. Pt she does not have numbness and tingling. No cultural, environmental, or spiritual barriers identified to treatment or learning.    Pain Scale: Alivia rates pain on a scale of 0-10 to be 6 at worst; 3 currently; 1 at best. She reports that certain movements elicit her pain but since the shot this has much improved. Those movements include flexion, abduction and IR.     Patient Goals: Pt would like to decrease pain and increase function so she can return to her normal daily activities.    Functional Limitations: Patient presents with the following functional Limitations affecting ADLS, work and leisure tasks:   Previous functional status includes: Independent with all ADLs.     Current FunctionalStatus:     Limitation of Functional Status as follows:   ADLs:     - Feeding: Independent    - Bathing: Independent    - Dressing: Independent    - Grooming:  Independent      IADLs:    - managing finances: Independent    - driving/handling transportation: Independent    - shopping: Unable to lift bags into car for transport home due to pain    - using phone:Independent    - computer:Independent    - managing medications: Independent    - housework & home maintenance: Difficulty reaching into cabinets and using arm for cleaning above shoulders       Occupation:   Working presently: Retired       Objective     Posture: Forward head and forward shoulders     UE Range of Motion  Active Range of Motion:   Shoulder Left Right   Flexion 155 160   Abduction 150 165   ER at 0 80 90   ER at 90 70 80   IR 60 80   Horz Adduct 20 42     ROM Comments:    Painful arc at 85 degrees that increases with initiation of movement    Concentric  pain    Pain at mid range     Painful Arc:   Patient demonstrates painful arc in shoulder flexion:  Through Flexion: Elevation 85 Degrees    Special Tests:  AC Joint Left   AC Joint Compression Test Positive   Empty Can Test Positive    Subscaputlaris Lift Off Positive   O'leona's test Positive    Hawkin's Kenndy Positive    Speed's test Negative for pain, but extreme weakness       Palpation: TTP at belly of supraspinatus as compared to both origin and insertion     Sensation: Grossly Intact       Scapular Control/Dyskinesis:    Normal / Subtle / Obvious  Comments    Left  Subtle Elevated and abducted scapula   Right  WNL WNL       OUTCOME MEASURE:FOTO    Category: Carrying, Handling, Moving Objects       Current Score  = 63% or 37% impaired  Goal at Discharge Score = 71% or 29% impaired    Score interpretation is as follows:     TEST SCORE  Modifier  Impairment Limitation Restriction    0%  CH  0 % impaired, limited or restricted    1-19%  CI  @ least 1% but less than 20% impaired, limited or restricted    20-39%  CJ  @ least 20%<40% impaired, limited or restricted    40-59%  CK  @ least 40%<60% impaired, limited or restricted    60-79%  CL  @ least  60% <80% impaired, limited or restricted    80-99%  CM  @ least 80%<100% impaired limited or restricted    100%  CN  100% impaired, limited or restricted         TREATMENT:  HEP provided: Scapular retractions, Scapular clocks, Isometric ER and IR   Instructed pt. Regarding: Proper technique with all exercises. Pt demo good understanding of the education provided. Alivia demonstrated good return demonstration of activities.      Assessment     This is a 51 y.o. female referred to outpatient occupational therapy and presents with a medical diagnosis of L proximal biceps tendonitis/labral patholgy and L RTC tendonitis and demonstrates limitations as described in the problem list. Pt will benefit from occupational therapy services in order to maximize pain free and/or functional use of left UE. The following goals were discussed with the patient and patient is in agreement with them as to be addressed in the treatment plan. Pt was given a HEP and verbally understood the instructions as they were given and demonstrated proper form and technique during therapy. Pt was advise to perform these exercises free of pain, and to stop performing them if pain occurs.     History Examination Decision Making Complexity Score   Occupational Profile: 51 year old female having difficulty with most ADLs due to decreased ROM and strength in shoulder.     Medical and Therapy History Review: low                 Performance Deficits:   -Shopping: Unable to lift bags into car for transport home due to pain  -Housework & home maintenance: Difficulty reaching into cabinets and using arm for cleaning above shoulders     Physical:  Decreased ROM   Decreased  and pinch strength   Decreased muscle strength   Decreased functional hand use   Increased pain   Edema  Scar Adhesions     Cognitive:  WNL    Psychosocial:    WNL     Modification of tasks during evaluation: Clinical decision making of low complexity, which includes an analysis of the  occupational profile, analysis of date from problem focused assessments, and the consideration of limited treatment options. Patient presents with few comorbidities that affect occupational performance. No modification(s) of tasks or assistance with assessments is necessary to enable completion of evaluation component.   LOW  Based on PMHX, co morbidities , data from assessments and functional level of assistance required with task and clinical presentation directly impacting           GOALS: To be achieved by 6 weeks  1) Independent with HEP  2) Pt will demonstrate at least 160 degrees of motion in left shoulder abduction plane of movement for improved participation in house keeping tasks including reaching into cabinets to get supplies.   3) Pt will demonstrate negative provocative testing for HK, Obriens, and Empty Can.  4) Independent and pain free with ADL's and IADL's  5)  Patient to score at 70% or more on FOTO to demonstrate improved perception of functional LUE use.  6) Patient will report at least 1-2 out of 10 on VAS for improved overall function and decreased pain.        Plan     Pt will be treated by occupational therapy 2 times a week for 6 weeks for Pt Education, HEP, therapeutic exercises, neuromuscular re-education, joint mobilizations, modalities as well as any other treatments deemed necessary based on the patient's needs or progress.                 I certify the need for these services furnished under this plan of treatment and while under my care.    ______________    ________________   Physician/Referring Practitioner  Date of Signature

## 2017-04-04 NOTE — PATIENT INSTRUCTIONS
Adduction (Active)        Maintaining erect posture, draw shoulders back while bringing elbows back and inward.  Repeat ____ times. Do ____ sessions per day.      Scapular Clocks (Active)        Roll shoulder up and back, then down and forward to complete a [place]. Repeat ____ times.  Reverse direction. Repeat ____ times.  Do ____ sessions per day.      SHOULDER: External Rotation (Isometric)        Using wall as resistance, press back of wrist against pillow. Keep elbow bent. Hold ___ seconds.  ___ reps per set, ___ sets per day, ___ days per week      Strengthening: Isometric Internal Rotation        Using door frame for resistance, press palm of right hand into ball using light pressure. Keep elbow in at side. Hold ____ seconds.  Repeat ____ times per set. Do ____ sets per session. Do ____ sessions per day.

## 2017-04-04 NOTE — PROGRESS NOTES
Occupational Therapy Shoulder Evaluation    Name: Alivia Marie Cyr  Clinic Number: [id number]  Diagnosis:   Encounter Diagnosis   Name Primary?    Left arm pain      Physician: Velma Garcia, *  Treatment Orders: Eval and Treat     Past Medical History:   Diagnosis Date    Asthma     Diabetes mellitus type II     DJD (degenerative joint disease) of knee 6/19/2014    Hyperlipidemia     Morbid obesity     Sleep apnea      Current Outpatient Prescriptions   Medication Sig    albuterol 90 mcg/actuation inhaler Take 2 puffs every 4-6 hours  as needed for shortness of breath/wheezing    aspirin (ECOTRIN) 81 MG EC tablet Take 1 tablet by mouth every morning. prevents heart attacks and strokes    atorvastatin (LIPITOR) 20 MG tablet Take 1 tablet (20 mg total) by mouth once daily.    blood sugar diagnostic Strp Freestyle light strips and lancets    celecoxib (CELEBREX) 200 MG capsule One capsule daily    cyclobenzaprine (FLEXERIL) 10 MG tablet Take 1 tablet (10 mg total) by mouth 3 (three) times daily as needed.    econazole nitrate 1 % cream Apply topically 2 (two) times daily.    ergocalciferol (ERGOCALCIFEROL) 50,000 unit Cap Take 1 capsule (50,000 Units total) by mouth twice a week.    fluconazole (DIFLUCAN) 150 MG Tab One daily for 3 days    insulin detemir (LEVEMIR FLEXPEN) 100 unit/mL (3 mL) SubQ InPn pen Inject 20 Units into the skin every evening.    insulin lispro (HUMALOG) 100 unit/mL injection Inject 11 Units into the skin 3 (three) times daily before meals. Glucose        Intervention  (units to add in addition to meal time Humalog base dose of 11 Units)                        0-80    orange juice             0 units     151 - 200       +2 Units     201 - 250       +4 Units     251 - 300       +6 Units     301 - 350        +8 Units     351 - 400       +10 Units     > 400           Call MD    lisinopril (PRINIVIL,ZESTRIL) 2.5 MG tablet One daily for proteinuria.    metformin  (GLUCOPHAGE-XR) 500 MG 24 hr tablet Take 2 tablets (1,000 mg total) by mouth 2 (two) times daily.    omeprazole (PRILOSEC) 20 MG capsule Take 1 capsule (20 mg total) by mouth every morning.    oxycodone-acetaminophen (PERCOCET)  mg per tablet Take 1 tablet by mouth every 6 (six) hours as needed for Pain.    vitamin D 185 MG Tab Take 5,000 mg by mouth once daily.     No current facility-administered medications for this visit.      Review of patient's allergies indicates:  No Known Allergies  Precautions: Standard     Evaluation Date: 4/4/2017        Subjective     Onset/SALIMA: gradual    Primary concern/ Chief complaints:    Alivia is a 51 y.o. female that presents to clinic secondary to left shoulder pain which happened about 3 months ago. She does not recall anything in particular that caused her pain to start, she realized she was having lots of pain when she was unable to sleep on her left side.  X-ray and MRI was taken and revealed nothing significant. Previous treatment included corticosteriod injection a week ago on 3/28. She reports relief with the shot. Pt she does not have numbness and tingling. No cultural, environmental, or spiritual barriers identified to treatment or learning.    Pain Scale: Alivia rates pain on a scale of 0-10 to be 6 at worst; 3 currently; 1 at best. She reports that certain movements elicit her pain but since the shot this has much improved. Those movements include flexion, abduction and IR.     Patient Goals: Pt would like to decrease pain and increase function so she can return to her normal daily activities.    Functional Limitations: Patient presents with the following functional Limitations affecting ADLS, work and leisure tasks:   Previous functional status includes: Independent with all ADLs.     Current FunctionalStatus:     Limitation of Functional Status as follows:   ADLs:     - Feeding: Independent    - Bathing: Independent    - Dressing: Independent    - Grooming:  Independent      IADLs:    - managing finances: Independent    - driving/handling transportation: Independent    - shopping: Unable to lift bags into car for transport home due to pain    - using phone:Independent    - computer:Independent    - managing medications: Independent    - housework & home maintenance: Difficulty reaching into cabinets and using arm for cleaning above shoulders       Occupation:   Working presently: Retired       Objective     Posture: Forward head and forward shoulders     UE Range of Motion  Active Range of Motion:   Shoulder Left Right   Flexion 155 160   Abduction 150 165   ER at 0 80 90   ER at 90 70 80   IR 60 80   Horz Adduct 20 42     ROM Comments:    Painful arc at 85 degrees that increases with initiation of movement    Concentric  pain    Pain at mid range     Painful Arc:   Patient demonstrates painful arc in shoulder flexion:  Through Flexion: Elevation 85 Degrees    Special Tests:  AC Joint Left   AC Joint Compression Test Positive   Empty Can Test Positive    Subscaputlaris Lift Off Positive   O'leona's test Positive    Hawkin's Kenndy Positive    Speed's test Negative for pain, but extreme weakness       Palpation: TTP at belly of supraspinatus as compared to both origin and insertion     Sensation: Grossly Intact       Scapular Control/Dyskinesis:    Normal / Subtle / Obvious  Comments    Left  Subtle Elevated and abducted scapula   Right  WNL WNL       OUTCOME MEASURE:FOTO    Category: Carrying, Handling, Moving Objects       Current Score  = 63% or 37% impaired  Goal at Discharge Score = 71% or 29% impaired    Score interpretation is as follows:     TEST SCORE  Modifier  Impairment Limitation Restriction    0%  CH  0 % impaired, limited or restricted    1-19%  CI  @ least 1% but less than 20% impaired, limited or restricted    20-39%  CJ  @ least 20%<40% impaired, limited or restricted    40-59%  CK  @ least 40%<60% impaired, limited or restricted    60-79%  CL  @ least  60% <80% impaired, limited or restricted    80-99%  CM  @ least 80%<100% impaired limited or restricted    100%  CN  100% impaired, limited or restricted         TREATMENT:  HEP provided: Scapular retractions, Scapular clocks, Isometric ER and IR   Instructed pt. Regarding: Proper technique with all exercises. Pt demo good understanding of the education provided. Alivia demonstrated good return demonstration of activities.      Assessment     This is a 51 y.o. female referred to outpatient occupational therapy and presents with a medical diagnosis of L proximal biceps tendonitis/labral patholgy and L RTC tendonitis and demonstrates limitations as described in the problem list. Pt will benefit from occupational therapy services in order to maximize pain free and/or functional use of left UE. The following goals were discussed with the patient and patient is in agreement with them as to be addressed in the treatment plan. Pt was given a HEP and verbally understood the instructions as they were given and demonstrated proper form and technique during therapy. Pt was advise to perform these exercises free of pain, and to stop performing them if pain occurs.     History Examination Decision Making Complexity Score   Occupational Profile: 51 year old female having difficulty with most ADLs due to decreased ROM and strength in shoulder.     Medical and Therapy History Review: low                 Performance Deficits:   -Shopping: Unable to lift bags into car for transport home due to pain  -Housework & home maintenance: Difficulty reaching into cabinets and using arm for cleaning above shoulders     Physical:  Decreased ROM   Decreased  and pinch strength   Decreased muscle strength   Decreased functional hand use   Increased pain   Edema  Scar Adhesions     Cognitive:  WNL    Psychosocial:    WNL     Modification of tasks during evaluation: Clinical decision making of low complexity, which includes an analysis of the  occupational profile, analysis of date from problem focused assessments, and the consideration of limited treatment options. Patient presents with few comorbidities that affect occupational performance. No modification(s) of tasks or assistance with assessments is necessary to enable completion of evaluation component.   LOW  Based on PMHX, co morbidities , data from assessments and functional level of assistance required with task and clinical presentation directly impacting           GOALS: To be achieved by 6 weeks  1) Independent with HEP  2) Pt will demonstrate at least 160 degrees of motion in left shoulder abduction plane of movement for improved participation in house keeping tasks including reaching into cabinets to get supplies.   3) Pt will demonstrate negative provocative testing for HK, Obriens, and Empty Can.  4) Independent and pain free with ADL's and IADL's  5)  Patient to score at 70% or more on FOTO to demonstrate improved perception of functional LUE use.  6) Patient will report at least 1-2 out of 10 on VAS for improved overall function and decreased pain.        Plan     Pt will be treated by occupational therapy 2 times a week for 6 weeks for Pt Education, HEP, therapeutic exercises, neuromuscular re-education, joint mobilizations, modalities as well as any other treatments deemed necessary based on the patient's needs or progress.                 I certify the need for these services furnished under this plan of treatment and while under my care.    ______________    ________________   Physician/Referring Practitioner  Date of Signature

## 2017-04-05 ENCOUNTER — TELEPHONE (OUTPATIENT)
Dept: BARIATRICS | Facility: CLINIC | Age: 52
End: 2017-04-05

## 2017-04-05 NOTE — TELEPHONE ENCOUNTER
Plans to attend her PCP appointment and make a diet appointment after that appointment. She states that she's been sticking to the diet and exercising. She feels like she has lost weight but will know for sure at her PCP appointment. She is still interested in bariatric surgery and plans to continue work-up.

## 2017-04-05 NOTE — TELEPHONE ENCOUNTER
----- Message from Risa Almeida PA-C sent at 4/5/2017  3:04 PM CDT -----  Please call patient to schedule f/u pre-op appt for sleeve.  Last saw eddie December 2016.    Thanks,  Risa

## 2017-04-06 ENCOUNTER — OFFICE VISIT (OUTPATIENT)
Dept: PSYCHIATRY | Facility: CLINIC | Age: 52
End: 2017-04-06
Payer: MEDICARE

## 2017-04-06 DIAGNOSIS — F43.22 ADJUSTMENT DISORDER WITH ANXIETY: Primary | ICD-10-CM

## 2017-04-06 PROCEDURE — 90834 PSYTX W PT 45 MINUTES: CPT | Mod: S$PBB,,, | Performed by: PSYCHOLOGIST

## 2017-04-06 PROCEDURE — 90834 PSYTX W PT 45 MINUTES: CPT | Mod: PBBFAC | Performed by: PSYCHOLOGIST

## 2017-04-06 NOTE — PROGRESS NOTES
"Individual Psychotherapy (PhD/LCSW)    4/6/2017    Site:  [facility]         Therapeutic Intervention: Met with patient.  Outpatient - Supportive psychotherapy 45 min - CPT Code 25553    Chief complaint/reason for encounter: Emotional eating     Interval history and content of current session: Ms. Shearer reports that she has been doing very well since our last appointment. She has been focusing a lot of energy on exercising and improving her eating habits, and has now decided to consider bariatric surgery a "last resort" as she is hoping to lose weight on her own with lifestyle changes. Pt is very motivated and encouraged by her improved emotional and physical health since making these changes. She also talked about her efforts in improving her self-care in other ways since starting therapy. While she continues to spend a lot of time caring for her father, she has also recognized the importance of not losing her own life. She realized that she was "blowing with the direction of the wind" instead of deliberately making choices to better herself, and she is now more goal-oriented. She has been socializing more, riding on her bike through her neighborhood, and putting up better boundaries with her ex-girlfriend. Though pt has improved, she asks to continue monthly psychotherapy appointments for continued support during her weight loss process.    Treatment plan:  · Target symptoms: emotional eating  · Why chosen therapy is appropriate versus another modality: relevant to diagnosis, patient responds to this modality  · Outcome monitoring methods: self-report, observation  · Therapeutic intervention type: behavior modifying psychotherapy, supportive psychotherapy    Risk parameters:  Patient reports no suicidal ideation  Patient reports no homicidal ideation  Patient reports no self-injurious behavior  Patient reports no violent behavior    Verbal deficits: None    Patient's response to intervention:  The " patient's response to intervention is accepting.    Progress toward goals and other mental status changes:  The patient's progress toward goals is good.    Diagnosis:   Adjustment Disorder w/ Anxious mood    Plan:  individual psychotherapy    Return to clinic: 3 weeks    Length of Service (minutes): 45

## 2017-04-10 ENCOUNTER — CLINICAL SUPPORT (OUTPATIENT)
Dept: REHABILITATION | Facility: HOSPITAL | Age: 52
End: 2017-04-10
Attending: ORTHOPAEDIC SURGERY
Payer: MEDICARE

## 2017-04-10 DIAGNOSIS — M25.512 ACUTE PAIN OF LEFT SHOULDER: ICD-10-CM

## 2017-04-10 DIAGNOSIS — M79.602 LEFT ARM PAIN: ICD-10-CM

## 2017-04-10 PROCEDURE — 97035 APP MDLTY 1+ULTRASOUND EA 15: CPT | Mod: PO

## 2017-04-10 PROCEDURE — 97110 THERAPEUTIC EXERCISES: CPT | Mod: PO

## 2017-04-10 NOTE — PROGRESS NOTES
"OT Daily Progress Note    Patient:  Alivia Marie Cyr  Clinic #:  [id number]   Date of Note: 04/10/2017   Referring Physician:  Velma Garcia, *  Diagnosis:  Shoulder Pain/Bicep Tendonitis/Shoulder Impingement  Encounter Diagnoses   Name Primary?    Left arm pain     Acute pain of left shoulder         Start Time: 10am  End Time: 11am  Total Time: 60 min  Group Time: 20    Visit 2 of 20 authorized  MEDICARE/DASH visit #2    Subjective:   "Its feeling ok but still real achy, I am so sorry about last week. My pipes burst and I had too much going on to even call"  Pain: 3 out of 10     Objective:   Patient seen by OT this session. Treatment  consist of the following:  Therapeutic exercises    Patient received MH x 5 min to L to increase blood flow, circulation and tissue elasticity prior to therex. Patient received ultrasound to anterior shoulder area to increase blood flow, circulation, tissue elasticity, pain management and for wound/scar management for 8 minutes @ 1.3 Mhz, Intensity 1.0 w/cm2 at 100* duty cycle. Pt participated in UBE task at level 1 for improved blood flow to shoulder, shoulder endurance, and shoulder ROM without impingement x 6 (3 forward/ 3 backward) minutes. Patient performed seated pulleys for flexion and abduction x 3 min each direction for stretching and to increase shoulder ROM and mobility. Patient instructed and performed orange t-band for rowing (scapular retraction with AB/ER) and shoulder extension x 15 reps each for scapular strengthening with emphasis on avoiding excessive upper trap compensation. Instructed on and performed eccentric bicep strengthening with 3# weights with 10 second hold x 15 reps for strengthening of muscles of longest length. Patient received cold pack x 5 minutes to decrease pain/inflammation and edema following treatment session. Pt received KT to inhibit bicep and promote scapular retraction with two "I" strips. Educated on wear time and precautions, " with patient verbalizing understanding.     Treatment: Ultrasound x 8 min, Therapeutic exercises x 35 min and Hot/cold modality x 10 min     Assessment:  Pt will continue to benefit from skilled OT intervention.  Following treatment today pt reported decreased pain. She was able to participate in all therapeutic exercises today but required verbal and tactile cuing to avoid over compensation of upper traps during scapular stabilization tasks. No adverse effects reported with modality use today. Pt had some difficulty with eccentric bicep task but was able to perform without pain after cuing.  Patient continues to demonstrate limitation  with  ROM, Decreased functional use, Decreased strength, Continued pain and Continued inflammation all of which interfere with vocational and leisurely pursuits.       New/Revised Goals: Continue POC  1) Independent with HEP  2) Pt will demonstrate at least 160 degrees of motion in left shoulder abduction plane of movement for improved participation in house keeping tasks including reaching into cabinets to get supplies.   3) Pt will demonstrate negative provocative testing for HK, Obriens, and Empty Can.  4) Independent and pain free with ADL's and IADL's  5) Patient to score at 70% or more on FOTO to demonstrate improved perception of functional LUE use.  6) Patient will report at least 1-2 out of 10 on VAS for improved overall function and decreased pain.      Plan:  Pt will be treated by occupational therapy 2 times a week for 6 weeks for Pt Education, HEP, therapeutic exercises, neuromuscular re-education, joint mobilizations, modalities as well as any other treatments deemed necessary based on the patient's needs or progress.

## 2017-04-11 ENCOUNTER — OFFICE VISIT (OUTPATIENT)
Dept: PAIN MEDICINE | Facility: CLINIC | Age: 52
End: 2017-04-11
Payer: MEDICARE

## 2017-04-11 VITALS
BODY MASS INDEX: 48.82 KG/M2 | SYSTOLIC BLOOD PRESSURE: 136 MMHG | WEIGHT: 293 LBS | HEART RATE: 87 BPM | RESPIRATION RATE: 18 BRPM | HEIGHT: 65 IN | DIASTOLIC BLOOD PRESSURE: 83 MMHG

## 2017-04-11 DIAGNOSIS — G89.29 CHRONIC PAIN OF BOTH KNEES: ICD-10-CM

## 2017-04-11 DIAGNOSIS — T84.018S FAILED TOTAL KNEE ARTHROPLASTY, SEQUELA: ICD-10-CM

## 2017-04-11 DIAGNOSIS — M25.562 CHRONIC PAIN OF BOTH KNEES: ICD-10-CM

## 2017-04-11 DIAGNOSIS — M47.816 FACET ARTHRITIS OF LUMBAR REGION: Primary | ICD-10-CM

## 2017-04-11 DIAGNOSIS — Z96.659 FAILED TOTAL KNEE ARTHROPLASTY, SEQUELA: ICD-10-CM

## 2017-04-11 DIAGNOSIS — M25.561 CHRONIC PAIN OF BOTH KNEES: ICD-10-CM

## 2017-04-11 DIAGNOSIS — Z79.891 ENCOUNTER FOR LONG-TERM OPIATE ANALGESIC USE: ICD-10-CM

## 2017-04-11 DIAGNOSIS — G89.4 CHRONIC PAIN DISORDER: ICD-10-CM

## 2017-04-11 DIAGNOSIS — Z96.651 STATUS POST TOTAL RIGHT KNEE REPLACEMENT: ICD-10-CM

## 2017-04-11 PROCEDURE — 99214 OFFICE O/P EST MOD 30 MIN: CPT | Mod: S$PBB,,, | Performed by: NURSE PRACTITIONER

## 2017-04-11 PROCEDURE — 99213 OFFICE O/P EST LOW 20 MIN: CPT | Mod: PBBFAC | Performed by: NURSE PRACTITIONER

## 2017-04-11 PROCEDURE — 99999 PR PBB SHADOW E&M-EST. PATIENT-LVL III: CPT | Mod: PBBFAC,,, | Performed by: NURSE PRACTITIONER

## 2017-04-11 RX ORDER — OXYCODONE AND ACETAMINOPHEN 10; 325 MG/1; MG/1
1 TABLET ORAL EVERY 6 HOURS PRN
Qty: 120 TABLET | Refills: 0 | Status: ON HOLD | OUTPATIENT
Start: 2017-04-12 | End: 2017-05-09 | Stop reason: SDUPTHER

## 2017-04-11 NOTE — PROGRESS NOTES
Subjective:      Patient ID: Alivia Thomas is a 51 y.o. female.    Chief Complaint: Knee Pain (1 month follow up)    Referred by: No ref. provider found     Interval History 4/11/2017:  The patient returns today for follow up and medication refill.  She has increased her dieting and exercise for weight loss.  She has lost 14 lbs since her last visit.  She is very excited about this.  She is walking 6 days per week.  She also stays active taking her of her elderly father.  She has given up meat for lent.  She continues to follow up with bariatrics.  Her biggest complaint today is lower back pain.  She previously had benefit with cooled lumbar RFAs and would like to schedule repeats.  Her pain is worse with prolonged standing and bending.  She is taking Percocet as needed for pain without adverse effects.  Her pain today is 6/10.  The patient denies any bowel or bladder incontinence or signs of saddle paresthesia.  The patient denies any major medical changes since last office visit.    Interval History 3/14/2017:  The patient returns today for follow up of back and knee pain.  She continues with measures for weight loss.  She is still planning on bariatric surgery but would like to lose weight on her own prior to this.  She has lost about 4 lbs since her visit with me last month.  She continues to take Percocet which helps her pain without adverse effects.  Her pain today is 8/10.  The patient denies any bowel or bladder incontinence or signs of saddle paresthesia.  The patient denies any major medical changes since last office visit.    Interval History 2/15/2017:  The patient returns today for follow up and medication refill.  She is still planning on having weight loss surgery in the future.  She admits that she has not been as active as previously.  She has a follow up with Dr. Swan scheduled next month.  She continues to take Percocet with benefit.  Her pain today is 8/10.      Interval History  1/18/2017:  The patient returns for follow up and medication refill.  She reports no major changes in her back and knee pain since her last visit.  She has had a lot going on with health issues of family members.  She takes care of her father and her brother, who were both recently hospitalized.  She still plans on having weight loss surgery in the future.  Her pain today is.  She is taking Percocet with benefit and without side effects at this time.    Interval History 12/15/2016:  The patient returns today for follow up of lower back and bilateral knee pain.  She continues to report relief from cooled lumbar RFAs in October.  She feels as though the colder weather is causing increased knee pain.  Since her last visit, she has decided to have weight loss surgery.  She was evaluated by bariatrics and is undergoing pre-op workup at this time.  Her pain today is 8/10.  She continue to take Percocet with significant benefit.      Interval History 11/3/2016:  The patient returns today for follow up of back pain.  She is s/p left then right L2,3,4,5 cooled RFA completed on 10/19/16 with 80% pain relief.  She is very satisfied with these results.  She continues to take Percocet with relief.  She has started kick boxing classes and continues to lose weight.  She has noticeable weight loss since her last visit and is very happy about this.  Her pain today is 8/10.    Interval History 9/16/2016:  The patient returns today with complaints of lower back and knee pain.  Her worst pain is in her lower back without radiation.  She has lost weight since her last weight.  She has increased her exercise which is helping.  She continues with her diet plan.  She is having the pool at her home fixed and plans to use this for exercise, as she has benefited from frequent pool therapy at [facility].  She previously had significant relief with lumbar RFAs in April for about 4 months.  She would like to repeat the procedures.  She  continues to take Percocet as needed which provides her relief.  Her pain today is 8/10.  The patient denies any bowel or bladder incontinence or signs of saddle paresthesia.      Interval History 8/19/2016:  The patient returns today for follow up and medication refill.  She complains of back and knee pain.  Her back pain does not radiate.  She did have relief with RFA in April but feels the pain is returning.  Her previous UTI has resolved.  She has been unable to return to her aquatic therapy because she is caring for her father.  She plans to start walking in the morning before her father wakes up.  She has gained a few pounds since her last visit and is upset about this, as she was previously losing at each visit.  She is also trying to control her diet.  She continues to take Percocet with significant pain relief.  Her pain today is 8/10.  The patient denies any bowel or bladder incontinence or signs of saddle paresthesia.  The patient denies any major medical changes since last office visit.    Interval History 7/19/2016:  The patient returns today for follow up.  She has a history of lower back and bilateral knee pain.  Since her last visit, she reports being diagnosed with a UTI and is currently on antibiotics. She has still been unable to return to aquatherapy.  She has been performing a home exercise routine.  She has lost 6 lbs since her visit last month.  She is taking Percocet as needed for pain.  She reports efficacy without adverse effects.  Her pain today is 7/10.      Interval History 6/20/2016:  Patient returns for follow up and medication refill.  She has a history of lower back and bilateral knee pain.  Since her last encounter, she reports that she has been suffering with an upper respiratory infection.  She saw Dr. Richardson last week and was started on oral Augmentin and antibiotic eye drops.  She reports that whenever she is sick, she suffers with swelling and drainage to her left eye.  She  states that her symptoms have improved since starting the eye drops.  She has been unable to participate in her daily pool therapy since she has been sick but is anxious to resume once she is feeling better.  She did have recent labwork which showed an improving A1C with cholesterol and triglycerides WNL.  She is proud of herself about this, as she reports be very good with her diet recently.  Her pain today is an 8/10.    Interval History 5/5/2016:  Patient returns today for procedure follow up.  She is s/p left then right L2,3,4,5 RFA completed on 4/20/16 with 70% pain relief so far.  She reports lower back and bilateral knee pain.  She has had genicular nerve blocks in the past which provided significant relief for her left knee pain and limited relief of her right knee pain.  She is currently doing physical therapy per self.  She is doing 45 minutes of pool therapy five days per week.  She thinks that this is helping with her pain and mobility.  She is trying to lose weight because she is aware that this will help with her pain.  She is taking percocet as needed which provided relief without side effects.  Her pain today is a 5/10.      Interval History: 3/28/2016:  Patient returns today for follow up of lower back and bilateral knee pain.  She is s/p Bilateral L2,3,4,5 MBB on 2/16/16 with 80% relief for 5 days and bilateral L2,3,4,5 MBB on 3/1/16 with 70% pain relief for 1 day.  She is requesting to schedule the RFAs.  The worst of her pain is located to her left lower back and does not radiate. She is still complaining of bilateral knee pain which is worse with walking and activity.  Her pain today is a 9/10.  The patient denies any bowel/bladder incontinence or symptoms of saddle paresthesia.  The patient denies any major medical changes since last OV. She is currently taking Percocet which helps her pain without any adverse effects.     Interval History: 12/17/2015:  Patient presents in clinic for follow up  for lower back, bilateral knee, and right arm pain. Her pain is 9/10 today. She underwent carpal tunnel revision 12/14/15 in the right wrist. She is currently out of her Percocet 10-325mg prescription.   Cont to have significant low back pain, wh will also ich she reports is her worst current pain.  Based on previous imaging we know that the patient has significant facet arthropathy in the lumbar spine at the levels of L3-4 and L4-5 L5-S1.  She describes dull achy with occasional sharp pain rates as 7/10.     Interval history 10/26/2015:  Patient returns to clinic for follow up previously seen for knee pain and low back pain. She reports pain is improved with Percocet 10/325 BID. She is no longer taking Lyrica and recently started Topamax. She continues to take Celebrex once per day and does help. She also continues to use a topical cream PRN and does help some. She has completed therapy since last visit but she is continuing to exercise regularly. Her pain in back and knees is unchanged in quality and distribution from previous visits. She otherwise denies any new issues at this time.     Interval history 9/21/2015:  Since previous encounter the patient comes in after having started using gabapentin and developing swelling in her legs.  She states that it did make a difference for her pain although she discontinued it secondary to swelling after a decrease in her dosing did not alleviate this.  She followed up with her orthopedist and did have x-rays performed which did not show any significant change compared to previous.  She is scheduled for a four-month follow-up.  She has not yet begun exercising but will begin soon she is scheduled for pool-based therapy.  The opioid medications have been helping her and her pain twice a day.  Further decrease to once a day prevented her from being able to function.  She does continue to take Celebrex without adverse reaction.  Additionally the patient stated that she has  been having lower back pain which is new.    Interval History 08-: Since previous visit patient comes in today to discuss her medications.  Patient states she is having pain in the lower back and both knee, sharp , throbbing , burning, and stabbing pain, she rates it 8/10.  Patient is taking percocet 10/325mg, we have been weaning her from this medication last prescription was provided for 30 tablets.  Additionally the patient is taking Celebrex with regularity with some improvement in her pain.  She has completed physical therapy status post knee replacement her knee hardware appears appropriate she has a scheduled appointment to follow-up with her orthopedic surgeon in one week to discuss using a extension brace while she is at home laying in bed.  She continues to swim twice a week and is actively trying to lose weight and has been dieting.    Interval History 06/19/2015:  Patient presents in clinic for two month follow up. She reports her bilateral knee pain and low back pain is an 8/10. She currently takes celebrex and Percocet for pain and uses a topical cream.  She was recently evaluated by her orthopedist and the hardware all appears to be appropriately placed and the next follow-up is in 6 weeks.  The patient continues to work on weight loss and exercise and states that she has been doing more than in the past.   Patient reports no other health changes since previous encounter.    Interval History 04/09/2015:  Patient presents in clinic for one month follow up. She reports bilateral knee pain and low back pain is a 9/10 today. She currently takes percocet for pain as needed and uses a cane for ambulation. She states that the low back pain is new.  She continues to have bilateral knee pain and is in physical therapy.  She continues to take Celebrex daily and uses a topical pain cream regularly.    Patient reports no other health changes since previous encounter.    Interval history 3/5/2015:  Since  previous encounter patient is status post right total knee replacement on 11/4/2014 and has been healed with postoperative visits showing good progress.  The patient does have continued physical therapy sessions and has been attempting to lose weight and has lost approximately 25 pounds although her BMI continues to be 54.  She has been making good efforts to try and continue to increase her range of motion and lose weight.  She continues to have significant pain in bilateral knees and continues to use a cane for ambulation.  She was receiving oxycodone/acetaminophen 10/325 every 8 hours by mouth when necessary and requiring in order to persist in her physical therapy although the topical pain cream that she has been applying has been helping her to a limited degree she still requires the medication regularly.  Her recent x-ray imaging shows good positioning of the prosthesis.  No other health changes since previous encounter.     Interval history 2/17/2014:  Patient reports that she has been using the topical compounded cream on the knee approximately 4 times per day and she states that it does help her with her pain that she is experiencing.  She states that she has not gone to formal physical therapy but that she has been going to a pool that has exercise classes which is free for her and that she feels like it is helping her continue to lose weight.  Additionally she continues using hydrocodone/acetaminophen 7.5/750 approximately 3 times per day when necessary and states that also helps with her pain symptoms.  He she's still talks to have replacement for the left knee, but would like to lose weight further before going to that.  She has also had previous injections into her knees which have offered little to no relief in her pain symptoms.  She has had no other health changes since previous encounter.    Previous encounter 1/21/2014:  HPI Comments: 47 yo female presents for initial evaluation of bilateral knee  pain, L>R. She is s/p arthroscopic right knee surgery and eventual replacement and then revision surgeries (Dr. Swan, Orthopedics). The pain is present in both knees and is described as a terrible ache. She hears occasional popping in both knees with movement. The pain is worse with being on her feet and getting up from a sitting to standing position. Denies lower ext weakness or paresthesias. She does not have back pain or pain radiating down her legs. The pain is better with Celebrex and rest. She also takes Vicodin ES TID but this makes her very sleepy. She is not sure it helps with the pain because she usually falls asleep.     Physical Therapy: not since 2011 just prior to her 3rd right knee surgery (revision after replacement); has tried swimming which helps with weight loss    Non-pharmacologic Treatment: none    Pain Medications: Percocet and celebrex    Blood thinners: ASA 81 mg daily     Interventional Therapies: steroid inj and visco-supplementation (series of 3) in left knee- not helpful  3/31/14 Bilateral genicular nerve block- significant relief of left knee, limited relief of right knee pain  2/16/16 Bilateral L2,3,4,5 MBB  3/1/16 Bilateral L2,3,4,5 MBB   4/6/16 Left L2,3,4,5 RFA- significant relief  4/20/16 Right L2,3,4,5 RFA- significant relief  10/5/16 Left L2,3,4,5 cooled RFA  10/19/16 Right L2,3,4,5 cooled RFA      Relevant Surgeries: right knee surgery x3 (arthroscopic, then replacement and subsequent revision surgery), s/p left total knee arthroplasty    Relevant Imaging:  Xray Lumbar spine 09/21/2015  Lumbar spine radiograph    Comparison: None    Results: AP, lateral neutral, lateral flexion , lateral extension, bilateral oblique and spot views. The alignment of the lumbar spine demonstrates a mild levoscoliosis . 11-mm anterior listhesis of L4 relative to L5 with no translational abnormalities  seen on flexion and extension views. The vertebral body heights are well-maintained , mild  disk space narrowing L4-L5 and L5-S1. Mild anterior and marginal osteophyte formation seen throughout the lumbar spine . The oblique views demonstrate no   definite spondylolysis. There is facet joint osseous hypertrophy noted at L3-L4 and L4-L5.      Impression         The Significant spondylosis of the lumbar spine with grade 1 anterior listhesis L4 relative to L5.  Facet joint osseous hypertrophy L3-L4 and L4-5.      Electronically signed by: BLESSING ROE MD  Date: 09/21/15  Time: 09:56           knee x-rays (see below) x-ray knee bilateral 8/26/2015:  Standing AP knees, lateral view of both knees and sunrise view of both patella. Study compared to May 2015. Postop change of bilateral knee replacement. The prosthetic components are in satisfactory position. Erosive changes involving the patella   again evident bilaterally.    Impression no significant change.      Xray Bilateral Knee 05/25/2015:  There are bilateral total knee arthroplasties with posterior resurfacing of the patella. As observed on 12/17/2014 there is a fracture of the left patella in the parasagittal plane.    Heterotopic bone is evident about each knee.    I detect no dislocation, unusual radiopaque retained foreign body, lytic or blastic lesion, or chondrocalcinosis.    Lab Results   Component Value Date    HGBA1C 10.6 (H) 12/15/2016     Lab Results   Component Value Date    CHOL 200 (H) 12/15/2016    CHOL 153 06/17/2016    CHOL 157 04/01/2015     Lab Results   Component Value Date    HDL 40 12/15/2016    HDL 44 06/17/2016    HDL 44 04/01/2015     Lab Results   Component Value Date    LDLCALC 141.0 12/15/2016    LDLCALC 92.6 06/17/2016    LDLCALC 97.2 04/01/2015     Lab Results   Component Value Date    TRIG 95 12/15/2016    TRIG 82 06/17/2016    TRIG 79 04/01/2015     Lab Results   Component Value Date    CHOLHDL 20.0 12/15/2016    CHOLHDL 28.8 06/17/2016    CHOLHDL 28.0 04/01/2015         Past Medical History:   Diagnosis Date     Asthma     Diabetes mellitus type II     DJD (degenerative joint disease) of knee 6/19/2014    Hyperlipidemia     Morbid obesity     Sleep apnea      Past Surgical History:   Procedure Laterality Date    CARPAL TUNNEL RELEASE      CARPAL TUNNEL RELEASE  1980s    left    CARPAL TUNNEL RELEASE  2012    right    JOINT REPLACEMENT Bilateral     with 2 revisions on rt    KNEE SURGERY  3/2010    orthroscope    KNEE SURGERY  6-19-14    left TKR     Family History   Problem Relation Age of Onset    Diabetes Mother     Hypertension Mother     Cataracts Mother     Diabetes Father     Cataracts Father     Coronary artery disease Brother     Amblyopia Neg Hx     Blindness Neg Hx     Cancer Neg Hx     Glaucoma Neg Hx     Macular degeneration Neg Hx     Retinal detachment Neg Hx     Strabismus Neg Hx     Stroke Neg Hx     Thyroid disease Neg Hx      Social History     Social History    Marital status: Single     Spouse name: N/A    Number of children: N/A    Years of education: N/A     Occupational History    Not on file.     Social History Main Topics    Smoking status: Never Smoker    Smokeless tobacco: Never Used    Alcohol use Yes      Comment: occasionally     Drug use: No    Sexual activity: Yes     Partners: Female     Birth control/ protection: None     Other Topics Concern    Not on file     Social History Narrative    Disabled. The patient is the youngest of 6 siblings. Single. Lives with single-sex partner.                      Review of patient's allergies indicates:  No Known Allergies    Current Outpatient Prescriptions:     albuterol 90 mcg/actuation inhaler, Take 2 puffs every 4-6 hours  as needed for shortness of breath/wheezing, Disp: 1 Inhaler, Rfl: 6    aspirin (ECOTRIN) 81 MG EC tablet, Take 1 tablet by mouth every morning. prevents heart attacks and strokes, Disp: , Rfl:     atorvastatin (LIPITOR) 20 MG tablet, Take 1 tablet (20 mg total) by mouth once daily., Disp: 30  tablet, Rfl: 6    blood sugar diagnostic Strp, Freestyle light strips and lancets, Disp: 100 each, Rfl: 6    celecoxib (CELEBREX) 200 MG capsule, One capsule daily, Disp: 30 capsule, Rfl: 2    econazole nitrate 1 % cream, Apply topically 2 (two) times daily., Disp: 30 g, Rfl: 2    ergocalciferol (ERGOCALCIFEROL) 50,000 unit Cap, Take 1 capsule (50,000 Units total) by mouth twice a week., Disp: 24 capsule, Rfl: 0    fluconazole (DIFLUCAN) 150 MG Tab, One daily for 3 days, Disp: 3 tablet, Rfl: 3    insulin detemir (LEVEMIR FLEXPEN) 100 unit/mL (3 mL) SubQ InPn pen, Inject 20 Units into the skin every evening., Disp: 1 Box, Rfl: 11    insulin lispro (HUMALOG) 100 unit/mL injection, Inject 11 Units into the skin 3 (three) times daily before meals. Glucose        Intervention  (units to add in addition to meal time Humalog base dose of 11 Units)                       0-80    orange juice            0 units    151 - 200       +2 Units    201 - 250       +4 Units    251 - 300       +6 Units    301 - 350        +8 Units    351 - 400       +10 Units    > 400           Call MD, Disp: 3 mL, Rfl: 11    lisinopril (PRINIVIL,ZESTRIL) 2.5 MG tablet, One daily for proteinuria., Disp: 30 tablet, Rfl: 6    metformin (GLUCOPHAGE-XR) 500 MG 24 hr tablet, Take 2 tablets (1,000 mg total) by mouth 2 (two) times daily., Disp: 360 tablet, Rfl: 6    omeprazole (PRILOSEC) 20 MG capsule, Take 1 capsule (20 mg total) by mouth every morning., Disp: 30 capsule, Rfl: 6    oxycodone-acetaminophen (PERCOCET)  mg per tablet, Take 1 tablet by mouth every 6 (six) hours as needed for Pain., Disp: 120 tablet, Rfl: 0    vitamin D 185 MG Tab, Take 5,000 mg by mouth once daily., Disp: , Rfl:     REVIEW OF SYSTEMS:    GENERAL:  Patient is actively losing weight.  RESPIRATORY:  Negative for cough, wheezing or shortness of breath, patient denies any recent URI.  CARDIOVASCULAR:  Negative for chest pain, leg swelling or  "palpitations.  GI:  Negative for abdominal discomfort, blood in stools or black stools or change in bowel habits, occasional constipation.  MUSCULOSKELETAL:  See HPI.  SKIN:  Negative for lesions, rash, and itching.  PSYCH:  No mood disorder or recent psychosocial stressors.  Patient's sleep is disturbed secondary to pain (patient reports also that she has insomnia).  HEMATOLOGY/LYMPHOLOGY:  Negative for prolonged bleeding, bruising easily or swollen nodes.  81 mg aspirin  ENDO: Patient has a history of diabetes  NEURO:   No history of headaches, syncope, paralysis, seizures or tremors.  All other reviewed and negative other than HPI.    OBJECTIVE:    /83  Pulse 87  Resp 18  Ht 5' 5" (1.651 m)  Wt (!) 157.2 kg (346 lb 9 oz)  BMI 57.67 kg/m2    PHYSICAL EXAMINATION:    GENERAL: Well appearing, in no acute distress, alert and oriented x3.   PSYCH:  Mood and affect appropriate.  SKIN: Skin color, texture, turgor normal, no rashes or lesions.  HEAD/FACE:  Normocephalic, atraumatic.   CV: RRR with palpation of the radial artery.  PULM: No evidence of respiratory difficulty, symmetric chest rise.  BACK: No pain to palpation over the lumbar facet joints.  Limited lumbar extension with pain.  Positive bilateral facet loading. Negative SLR bilaterally.  Pain with palpation to bilateral SI joints.  JENNA is negative bilaterally.  EXTREMITIES:  Well-healed midline scars to bilateral knees.  Painful extension and flexion of bilateral knees.  Medial joint line tenderness to bilateral knees.    MUSCULOSKELETAL: Bilateral upper and lower extremity strength is normal and symmetric.  No atrophy or tone abnormalities are noted.  NEURO: Bilateral lower extremity coordination and muscle stretch reflexes are physiologic and symmetric.  Plantar response are downgoing. No clonus.  No loss of sensation is noted.  GAIT: Antalgic- ambulating without assistance.       Assessment:       Encounter Diagnoses   Name Primary?    Facet " arthritis of lumbar region Yes    Chronic pain disorder     Chronic pain of both knees     Status post total right knee replacement     Encounter for long-term opiate analgesic use     Failed total knee arthroplasty, sequela          Plan:       - Previous imaging was reviewed and discussed with the patient today.     - Continue to f/u with bariatrics for possible gastric sleeve surgery.      - The patient will continue a home exercise routine to help with pain and strengthening.  She is walking and/or riding her bike daily.  We had a long discussion regarding the importance of maintaining proper weight in pain control.    - Will schedule left then right L2,3,4,5 cooled RFA, 2 weeks apart.  The procedure, risks, benefits and options were discussed with patient. There are no contraindications to the procedure. The patient expressed understanding and agreed to proceed.  Consent obtained today.    - Continue oxycodone-acetaminophen 10/325 one tablet every 6 hours PRN pain, #120, 0 refills.    - The [place] Board of Pharmacy website for prescription monitoring was consulted today, and it does not suggest any deviations in conflict with the patient's controlled substance contract with our clinic. Will continue current therapy with frequent monitoring of the controlled substance database, and urine drug screens on followup.     - UDS from 1/18/17 was reviewed and is compliant.  No UDS ordered today.    - Continue topical pain cream PRN.    - RTC in 1 month or sooner if needed.    - Dr. Wagner was consulted on the patient and agrees with this plan.      The above plan and management options were discussed at length with patient. Patient is in agreement with the above and verbalized understanding.     Virginia Sanchez, EMILY  04/11/2017

## 2017-04-11 NOTE — MR AVS SNAPSHOT
[facility]  [address]  [facility] [address]  Phone: [phone]  Fax: [phone]                  Alivia Thomas   2017 8:00 AM   Office Visit    Description:  Female : 1965   Provider:  EMILY Huggins   Department:  [facility]           Reason for Visit     Knee Pain           Diagnoses this Visit        Comments    Facet arthritis of lumbar region    -  Primary     Chronic pain disorder         Chronic pain of both knees         Status post total right knee replacement         Encounter for long-term opiate analgesic use         Failed total knee arthroplasty, sequela                To Do List           Future Appointments        Provider Department Dept Phone    2017 9:00 AM Meg Bishop OT [facility] [phone]    2017 9:00 AM Meg Bishop OT [facility] [phone]    2017 9:00 AM Meg Bishop OT [facility] [phone]    2017 9:00 AM Meg Bishop OT [facility] [phone]    2017 8:00 AM Leeanne Silva MD [facility] - OB/GYN 5th Floor [phone]      Your Future Surgeries/Procedures     2017   Surgery with Lina Wagner MD   [facility] ([facility]    [address] [facility] [address] [phone]            May 09, 2017   Surgery with Lina Wagner MD   [facility] ([facility])    [address]  [facility] [address] [phone]              Goals (5 Years of Data)     None       These Medications        Disp Refills Start End    oxycodone-acetaminophen (PERCOCET)  mg per tablet 120 tablet 0 2017    Take 1 tablet by mouth every 6 (six) hours as needed for Pain. - Oral    Pharmacy: [facility] [place], [place] - [address] [address] [facility] Ph #:  [phone]         [facility]     [facility] Nurse Care Line - 24/7 Assistance  Unless otherwise directed by your provider, please contact [facility], our nurse care line that is available for 24/7 assistance.     Registered nurses in the [facility] provide: appointment scheduling, clinical advisement, health education, and other advisory services.  Call: [phone] (toll free)               Medications           Message regarding Medications     Verify the changes and/or additions to your medication regime listed below are the same as discussed with your clinician today.  If any of these changes or additions are incorrect, please notify your healthcare provider.             Verify that the below list of medications is an accurate representation of the medications you are currently taking.  If none reported, the list may be blank. If incorrect, please contact your healthcare provider. Carry this list with you in case of emergency.           Current Medications     albuterol 90 mcg/actuation inhaler Take 2 puffs every 4-6 hours  as needed for shortness of breath/wheezing    aspirin (ECOTRIN) 81 MG EC tablet Take 1 tablet by mouth every morning. prevents heart attacks and strokes    atorvastatin (LIPITOR) 20 MG tablet Take 1 tablet (20 mg total) by mouth once daily.    blood sugar diagnostic Strp Freestyle light strips and lancets    celecoxib (CELEBREX) 200 MG capsule One capsule daily    econazole nitrate 1 % cream Apply topically 2 (two) times daily.    ergocalciferol (ERGOCALCIFEROL) 50,000 unit Cap Take 1 capsule (50,000 Units total) by mouth twice a week.    fluconazole (DIFLUCAN) 150 MG Tab One daily for 3 days    insulin detemir (LEVEMIR FLEXPEN) 100 unit/mL (3 mL) SubQ InPn pen Inject 20 Units into the skin every evening.    insulin lispro (HUMALOG) 100 unit/mL injection Inject 11 Units into the skin 3 (three) times daily before meals. Glucose        Intervention  (units to  "add in addition to meal time Humalog base dose of 11 Units)                        0-80    orange juice             0 units     151 - 200       +2 Units     201 - 250       +4 Units     251 - 300       +6 Units     301 - 350        +8 Units     351 - 400       +10 Units     > 400           Call MD    lisinopril (PRINIVIL,ZESTRIL) 2.5 MG tablet One daily for proteinuria.    metformin (GLUCOPHAGE-XR) 500 MG 24 hr tablet Take 2 tablets (1,000 mg total) by mouth 2 (two) times daily.    omeprazole (PRILOSEC) 20 MG capsule Take 1 capsule (20 mg total) by mouth every morning.    oxycodone-acetaminophen (PERCOCET)  mg per tablet Starting on Apr 12, 2017. Take 1 tablet by mouth every 6 (six) hours as needed for Pain.    vitamin D 185 MG Tab Take 5,000 mg by mouth once daily.           Clinical Reference Information           Your Vitals Were     BP Pulse Resp Height Weight BMI    136/83 87 18 5' 5" (1.651 m) 157.2 kg (346 lb 9 oz) 57.67 kg/m2      Blood Pressure          Most Recent Value    BP  136/83      Allergies as of 4/11/2017     No Known Allergies      Immunizations Administered on Date of Encounter - 4/11/2017     None      [facility] Sign-Up     Activating your [facility] account is as easy as 1-2-3!     1) Visit [email or web address], select Sign Up Now, enter this activation code and your date of birth, then select Next.  [email or web address]  Expires: 5/26/2017  9:15 AM      2) Create a username and password to use when you visit [facility] in the future and select a security question in case you lose your password and select Next.    3) Enter your e-mail address and click Sign Up!    Additional Information  If you have questions, please e-mail [email or web address] or call [phone] to talk to our [facility] staff. Remember, [facility] is NOT to be used for urgent needs. For medical emergencies, dial 911.         Language Assistance Services     ATTENTION: Language assistance services are available, free of " charge. Please call [phone].      ATENCIÓN: Si habla español, tiene a abad disposición servicios gratuitos de asistencia lingüística. Llame al [phone].     CHÚ Ý: N?u b?n nói Ti?ng Vi?t, có các d?ch v? h? tr? ngôn ng? mi?n phí dành cho b?n. G?i s? [phone].         [facility] complies with applicable Federal civil rights laws and does not discriminate on the basis of race, color, national origin, age, disability, or sex.

## 2017-04-12 DIAGNOSIS — M25.561 KNEE PAIN, BILATERAL: ICD-10-CM

## 2017-04-12 DIAGNOSIS — M25.562 KNEE PAIN, BILATERAL: ICD-10-CM

## 2017-04-13 ENCOUNTER — CLINICAL SUPPORT (OUTPATIENT)
Dept: REHABILITATION | Facility: HOSPITAL | Age: 52
End: 2017-04-13
Attending: ORTHOPAEDIC SURGERY
Payer: MEDICARE

## 2017-04-13 DIAGNOSIS — M79.602 LEFT ARM PAIN: ICD-10-CM

## 2017-04-13 DIAGNOSIS — M25.512 ACUTE PAIN OF LEFT SHOULDER: ICD-10-CM

## 2017-04-13 PROCEDURE — 97140 MANUAL THERAPY 1/> REGIONS: CPT | Mod: PO

## 2017-04-13 PROCEDURE — 97110 THERAPEUTIC EXERCISES: CPT | Mod: PO

## 2017-04-13 RX ORDER — CELECOXIB 200 MG/1
CAPSULE ORAL
Qty: 30 CAPSULE | Refills: 2 | Status: SHIPPED | OUTPATIENT
Start: 2017-04-13 | End: 2017-08-07 | Stop reason: SDUPTHER

## 2017-04-13 NOTE — PROGRESS NOTES
"OT Daily Progress Note    Patient:  Alivia Marie Cyr  Clinic #:  [id number]   Date of Note: 04/13/2017   Referring Physician:  Velma Garcia, *  Diagnosis:  Shoulder Pain/Bicep Tendonitis/Shoulder Impingement  Encounter Diagnoses   Name Primary?    Left arm pain     Acute pain of left shoulder         Start Time: 10am  End Time: 11am  Total Time: 60 min  Group Time: 20    Visit 3 of 20 authorized  MEDICARE/DASH visit #3    Subjective:   "Its feeling ok but still real achy, I am so sorry about last week. My pipes burst and I had too much going on to even call"  Pain: 3 out of 10     Objective:   Patient seen by OT this session. Treatment  consist of the following:  Therapeutic exercises    Patient received MH x 5 min to L to increase blood flow, circulation and tissue elasticity prior to therex. Patient received ultrasound to anterior shoulder area to increase blood flow, circulation, tissue elasticity, pain management and for wound/scar management for 8 minutes @ 1.3 Mhz, Intensity 1.0 w/cm2 at 100* duty cycle. Pt participated in UBE task at level 1 for improved blood flow to shoulder, shoulder endurance, and shoulder ROM without impingement x 6 (3 forward/ 3 backward) minutes. Patient performed seated pulleys for flexion and abduction x 3 min each direction for stretching and to increase shoulder ROM and mobility. Patient instructed and performed orange t-band for rowing (scapular retraction with AB/ER) and shoulder extension x 15 reps each for scapular strengthening with emphasis on avoiding excessive upper trap compensation also added isometric ER and IR walk outs x 15 reps each. Instructed on and performed eccentric bicep strengthening with 3# weights with 10 second hold x 15 reps for strengthening of muscles of longest length. Patient received cold pack x 5 minutes to decrease pain/inflammation and edema following treatment session. Pt received CFM to LH of bicep origin to trigger an inflammatory " healing response and stimulate the production of new collagen and proper, more functional, less painful healing.    Treatment: Ultrasound x 8 min, Therapeutic exercises x 35 min and Manual x 10 min     Assessment:  Pt will continue to benefit from skilled OT intervention.  Following treatment today pt reported decreased pain. She was able to participate in all therapeutic exercises today but required verbal and tactile cuing to avoid over compensation of upper traps during scapular stabilization tasks. No adverse effects reported with modality use today. Pain with CFM and educated pt on benefits and potential for soreness following treatment today. Pt had some difficulty with eccentric bicep task but was able to perform without pain after cuing.  Patient continues to demonstrate limitation  with  ROM, Decreased functional use, Decreased strength, Continued pain and Continued inflammation all of which interfere with vocational and leisurely pursuits.       New/Revised Goals: Continue POC  1) Independent with HEP  2) Pt will demonstrate at least 160 degrees of motion in left shoulder abduction plane of movement for improved participation in house keeping tasks including reaching into cabinets to get supplies.   3) Pt will demonstrate negative provocative testing for HK, Obriens, and Empty Can.  4) Independent and pain free with ADL's and IADL's  5) Patient to score at 70% or more on FOTO to demonstrate improved perception of functional LUE use.  6) Patient will report at least 1-2 out of 10 on VAS for improved overall function and decreased pain.      Plan:  Pt will be treated by occupational therapy 2 times a week for 6 weeks for Pt Education, HEP, therapeutic exercises, neuromuscular re-education, joint mobilizations, modalities as well as any other treatments deemed necessary based on the patient's needs or progress.

## 2017-04-18 ENCOUNTER — CLINICAL SUPPORT (OUTPATIENT)
Dept: REHABILITATION | Facility: HOSPITAL | Age: 52
End: 2017-04-18
Attending: ORTHOPAEDIC SURGERY
Payer: MEDICARE

## 2017-04-18 DIAGNOSIS — M25.512 ACUTE PAIN OF LEFT SHOULDER: ICD-10-CM

## 2017-04-18 DIAGNOSIS — M79.602 LEFT ARM PAIN: ICD-10-CM

## 2017-04-18 PROCEDURE — 97140 MANUAL THERAPY 1/> REGIONS: CPT | Mod: PO

## 2017-04-18 PROCEDURE — 97110 THERAPEUTIC EXERCISES: CPT | Mod: PO

## 2017-04-18 PROCEDURE — 97035 APP MDLTY 1+ULTRASOUND EA 15: CPT | Mod: PO

## 2017-04-18 NOTE — PROGRESS NOTES
"OT Daily Progress Note    Patient:  Alivia Marie Cyr  Clinic #:  [id number]   Date of Note: 04/18/2017   Referring Physician:  Velma Garcia, *  Diagnosis:  Shoulder Pain/Bicep Tendonitis/Shoulder Impingement  Encounter Diagnoses   Name Primary?    Left arm pain     Acute pain of left shoulder         Start Time: 10am  End Time: 1040am  Total Time: 40 min  Group Time:    Visit 4 of 20 authorized  MEDICARE/DASH visit #4    Subjective:   "Its feeling a little better, but I have to leave a little early today"  Pain: 3 out of 10     Objective:   Patient seen by OT this session. Treatment  consist of the following:  Therapeutic exercises     Patient received ultrasound to anterior shoulder area to increase blood flow, circulation, tissue elasticity, pain management and for wound/scar management for 8 minutes @ 1.3 Mhz, Intensity 1.0 w/cm2 at 100* duty cycle. Pt participated in UBE task at level 1 for improved blood flow to shoulder, shoulder endurance, and shoulder ROM without impingement x 6 (3 forward/ 3 backward) minutes. Patient performed seated pulleys for flexion and abduction x 3 min each direction for stretching and to increase shoulder ROM and mobility.  Pt received CFM to LH of bicep origin to trigger an inflammatory healing response and stimulate the production of new collagen and proper, more functional, less painful healing.    Treatment: Ultrasound x 8 min, Therapeutic exercises x 35 min and Manual x 10 min     Assessment:  Pt will continue to benefit from skilled OT intervention.  Following treatment today pt reported decreased pain. She was able to participate in therapeutic exercises today without adverse effects. Tolerated CFM well and reported less pain on arrival today. Patient continues to demonstrate limitation  with  ROM, Decreased functional use, Decreased strength, Continued pain and Continued inflammation all of which interfere with vocational and leisurely pursuits.       New/Revised " Goals: Continue POC  1) Independent with HEP  2) Pt will demonstrate at least 160 degrees of motion in left shoulder abduction plane of movement for improved participation in house keeping tasks including reaching into cabinets to get supplies.   3) Pt will demonstrate negative provocative testing for HK, Obriens, and Empty Can.  4) Independent and pain free with ADL's and IADL's  5) Patient to score at 70% or more on FOTO to demonstrate improved perception of functional LUE use.  6) Patient will report at least 1-2 out of 10 on VAS for improved overall function and decreased pain.      Plan:  Pt will be treated by occupational therapy 2 times a week for 6 weeks for Pt Education, HEP, therapeutic exercises, neuromuscular re-education, joint mobilizations, modalities as well as any other treatments deemed necessary based on the patient's needs or progress.

## 2017-04-25 ENCOUNTER — CLINICAL SUPPORT (OUTPATIENT)
Dept: REHABILITATION | Facility: HOSPITAL | Age: 52
End: 2017-04-25
Attending: ORTHOPAEDIC SURGERY
Payer: MEDICARE

## 2017-04-25 DIAGNOSIS — M79.602 LEFT ARM PAIN: ICD-10-CM

## 2017-04-25 DIAGNOSIS — M25.512 ACUTE PAIN OF LEFT SHOULDER: ICD-10-CM

## 2017-04-25 PROCEDURE — 97035 APP MDLTY 1+ULTRASOUND EA 15: CPT | Mod: PO

## 2017-04-25 PROCEDURE — 97140 MANUAL THERAPY 1/> REGIONS: CPT | Mod: PO

## 2017-04-25 PROCEDURE — 97110 THERAPEUTIC EXERCISES: CPT | Mod: PO

## 2017-04-25 NOTE — PROGRESS NOTES
"OT Daily Progress Note    Patient:  Alivia Marie Cyr  Clinic #:  [id number]   Date of Note: 04/25/2017   Referring Physician:  Velma Garcia, *  Diagnosis:  Shoulder Pain/Bicep Tendonitis/Shoulder Impingement  Encounter Diagnoses   Name Primary?    Left arm pain     Acute pain of left shoulder         Start Time: 10am  End Time: 11am  Total Time: 60 min  Group Time: 20    Visit 5 of 20 authorized  MEDICARE/DASH visit #5    Subjective:   "I really am feeling better. This is really helping "  Pain: 3 out of 10     Objective:   Patient seen by OT this session. Treatment  consist of the following:  Therapeutic exercises    Patient received MH x 5 min to L to increase blood flow, circulation and tissue elasticity prior to therex. Patient received ultrasound to anterior shoulder area to increase blood flow, circulation, tissue elasticity, pain management and for wound/scar management for 8 minutes @ 1.3 Mhz, Intensity 1.0 w/cm2 at 100* duty cycle. Pt participated in UBE task at level 1 for improved blood flow to shoulder, shoulder endurance, and shoulder ROM without impingement x 6 (3 forward/ 3 backward) minutes. Patient performed seated pulleys for flexion and abduction x 3 min each direction for stretching and to increase shoulder ROM and mobility. Patient instructed and performed upgraded green t-band for rowing (scapular retraction with AB/ER) and shoulder extension x 15 reps each for scapular strengthening with emphasis on avoiding excessive upper trap compensation also added isometric ER and IR walk outs x 15 reps each. Instructed on and performed eccentric bicep strengthening with 3# weights with 10 second hold x 15 reps for strengthening of muscles of longest length. Patient received cold pack x 5 minutes to decrease pain/inflammation and edema following treatment session. Pt received CFM to LH of bicep origin to trigger an inflammatory healing response and stimulate the production of new collagen and " proper, more functional, less painful healing.    Treatment: Ultrasound x 8 min, Therapeutic exercises x 35 min and Manual x 10 min     Assessment:  Pt will continue to benefit from skilled OT intervention.  Following treatment today pt reported decreased pain. She was able to participate in all therapeutic exercises today but required verbal and tactile cuing to avoid over compensation of upper traps during scapular stabilization tasks once again but was able to upgraded to more resistance with band. No adverse effects reported with modality use today. Pain with CFM and educated pt on benefits and potential for soreness following treatment today. Pt had some difficulty with eccentric bicep task but was able to perform without pain after cuing.  Patient continues to demonstrate limitation  with  ROM, Decreased functional use, Decreased strength, Continued pain and Continued inflammation all of which interfere with vocational and leisurely pursuits.       New/Revised Goals: Continue POC  1) Independent with HEP  2) Pt will demonstrate at least 160 degrees of motion in left shoulder abduction plane of movement for improved participation in house keeping tasks including reaching into cabinets to get supplies.   3) Pt will demonstrate negative provocative testing for HK, Obriens, and Empty Can.  4) Independent and pain free with ADL's and IADL's  5) Patient to score at 70% or more on FOTO to demonstrate improved perception of functional LUE use.  6) Patient will report at least 1-2 out of 10 on VAS for improved overall function and decreased pain.      Plan:  Pt will be treated by occupational therapy 2 times a week for 6 weeks for Pt Education, HEP, therapeutic exercises, neuromuscular re-education, joint mobilizations, modalities as well as any other treatments deemed necessary based on the patient's needs or progress.

## 2017-04-26 ENCOUNTER — HOSPITAL ENCOUNTER (OUTPATIENT)
Facility: OTHER | Age: 52
Discharge: HOME | End: 2017-04-26
Attending: ANESTHESIOLOGY | Admitting: ANESTHESIOLOGY
Payer: MEDICARE

## 2017-04-26 ENCOUNTER — NURSE TRIAGE (OUTPATIENT)
Dept: ADMINISTRATIVE | Facility: CLINIC | Age: 52
End: 2017-04-26

## 2017-04-26 ENCOUNTER — SURGERY (OUTPATIENT)
Age: 52
End: 2017-04-26

## 2017-04-26 VITALS
HEIGHT: 65 IN | HEART RATE: 95 BPM | BODY MASS INDEX: 48.82 KG/M2 | TEMPERATURE: 99 F | RESPIRATION RATE: 16 BRPM | WEIGHT: 293 LBS | DIASTOLIC BLOOD PRESSURE: 56 MMHG | SYSTOLIC BLOOD PRESSURE: 124 MMHG | OXYGEN SATURATION: 98 %

## 2017-04-26 DIAGNOSIS — M25.561 CHRONIC PAIN OF BOTH KNEES: Primary | ICD-10-CM

## 2017-04-26 DIAGNOSIS — G89.29 CHRONIC PAIN OF BOTH KNEES: Primary | ICD-10-CM

## 2017-04-26 DIAGNOSIS — M25.562 CHRONIC PAIN OF BOTH KNEES: Primary | ICD-10-CM

## 2017-04-26 LAB
GLUCOSE SERPL-MCNC: 263 MG/DL (ref 70–110)
POCT GLUCOSE: 263 MG/DL (ref 70–110)

## 2017-04-26 PROCEDURE — A4649 SURGICAL SUPPLIES: HCPCS | Performed by: ANESTHESIOLOGY

## 2017-04-26 PROCEDURE — 64636 DESTROY L/S FACET JNT ADDL: CPT | Performed by: ANESTHESIOLOGY

## 2017-04-26 PROCEDURE — 99152 MOD SED SAME PHYS/QHP 5/>YRS: CPT | Mod: ,,, | Performed by: ANESTHESIOLOGY

## 2017-04-26 PROCEDURE — 64635 DESTROY LUMB/SAC FACET JNT: CPT | Mod: LT,,, | Performed by: ANESTHESIOLOGY

## 2017-04-26 PROCEDURE — 63600175 PHARM REV CODE 636 W HCPCS: Performed by: ANESTHESIOLOGY

## 2017-04-26 PROCEDURE — 82947 ASSAY GLUCOSE BLOOD QUANT: CPT | Performed by: ANESTHESIOLOGY

## 2017-04-26 PROCEDURE — 64636 DESTROY L/S FACET JNT ADDL: CPT | Mod: LT,,, | Performed by: ANESTHESIOLOGY

## 2017-04-26 PROCEDURE — 25000003 PHARM REV CODE 250: Performed by: ANESTHESIOLOGY

## 2017-04-26 PROCEDURE — 64635 DESTROY LUMB/SAC FACET JNT: CPT | Performed by: ANESTHESIOLOGY

## 2017-04-26 RX ORDER — MIDAZOLAM HYDROCHLORIDE 1 MG/ML
INJECTION INTRAMUSCULAR; INTRAVENOUS
Status: DISCONTINUED | OUTPATIENT
Start: 2017-04-26 | End: 2017-04-26 | Stop reason: HOSPADM

## 2017-04-26 RX ORDER — SODIUM CHLORIDE 9 MG/ML
INJECTION, SOLUTION INTRAVENOUS CONTINUOUS
Status: DISCONTINUED | OUTPATIENT
Start: 2017-04-26 | End: 2017-04-26 | Stop reason: HOSPADM

## 2017-04-26 RX ORDER — FENTANYL CITRATE 50 UG/ML
INJECTION, SOLUTION INTRAMUSCULAR; INTRAVENOUS
Status: DISCONTINUED | OUTPATIENT
Start: 2017-04-26 | End: 2017-04-26 | Stop reason: HOSPADM

## 2017-04-26 RX ORDER — LIDOCAINE HYDROCHLORIDE 10 MG/ML
10 INJECTION INFILTRATION; PERINEURAL
Status: COMPLETED | OUTPATIENT
Start: 2017-04-26 | End: 2017-04-26

## 2017-04-26 RX ORDER — BUPIVACAINE HYDROCHLORIDE 2.5 MG/ML
10 INJECTION, SOLUTION EPIDURAL; INFILTRATION; INTRACAUDAL ONCE
Status: COMPLETED | OUTPATIENT
Start: 2017-04-26 | End: 2017-04-26

## 2017-04-26 RX ORDER — MIDAZOLAM HYDROCHLORIDE 1 MG/ML
2 INJECTION INTRAMUSCULAR; INTRAVENOUS
Status: DISCONTINUED | OUTPATIENT
Start: 2017-04-26 | End: 2017-04-26 | Stop reason: HOSPADM

## 2017-04-26 RX ORDER — FENTANYL CITRATE 50 UG/ML
100 INJECTION, SOLUTION INTRAMUSCULAR; INTRAVENOUS ONCE
Status: DISCONTINUED | OUTPATIENT
Start: 2017-04-26 | End: 2017-04-26 | Stop reason: HOSPADM

## 2017-04-26 RX ADMIN — SODIUM CHLORIDE: 0.9 INJECTION, SOLUTION INTRAVENOUS at 12:04

## 2017-04-26 RX ADMIN — LIDOCAINE HYDROCHLORIDE 10 ML: 10 INJECTION, SOLUTION INFILTRATION; PERINEURAL at 01:04

## 2017-04-26 RX ADMIN — FENTANYL CITRATE 25 MCG: 50 INJECTION, SOLUTION INTRAMUSCULAR; INTRAVENOUS at 01:04

## 2017-04-26 RX ADMIN — FENTANYL CITRATE 50 MCG: 50 INJECTION, SOLUTION INTRAMUSCULAR; INTRAVENOUS at 01:04

## 2017-04-26 RX ADMIN — MIDAZOLAM HYDROCHLORIDE 2 MG: 1 INJECTION, SOLUTION INTRAMUSCULAR; INTRAVENOUS at 01:04

## 2017-04-26 RX ADMIN — BUPIVACAINE HYDROCHLORIDE 10 ML: 2.5 INJECTION, SOLUTION EPIDURAL; INFILTRATION; INTRACAUDAL; PERINEURAL at 01:04

## 2017-04-26 NOTE — TELEPHONE ENCOUNTER
"Becyk reporting pain 9 of 10 following radiofrequency thermocoagulation, lower left back, radiating to her left thigh. Procedure was done earlier today.  No weakness.  She has not applied any cold to the site, nor has she taken any pain medication.  Recommended she take the pain medication as was prescribed by Dr Wagner for her (she states Percocet), apply cold pack (home care advice given), and call back for pain not responsive to medication and application of cold.  Message to Dr Wagner.  Please contact caller directly with any additional care advice.      Reason for Disposition   Other post-op symptom or question (all triage questions negative)    Answer Assessment - Initial Assessment Questions  1. SYMPTOM: "What's the main symptom you're concerned about?" (e.g., pain, fever, vomiting)      Pain.    2. ONSET: "When did ________  start?"      It was there immediately after, but I thought it would subside.  It has been several hours, but is not subsiding.    3. SURGERY: "What surgery was performed?"      Radiofrequency thermocoagulation on the left side of my lower back.    4. DATE of SURGERY: "When was surgery performed?"       Today at 1215.    5. ANESTHESIA: " What type of anesthesia did you have?" (e.g., general, spinal, epidural, local)        6. PAIN: "Is there any pain?" If so, ask: "How bad is it?"  (Scale 1-10; or mild, moderate, severe)      9 of 10 on pain scale. Pain is radiating to my left upper thigh.    7. FEVER: "Do you have a fever?" If so, ask: "What is your temperature, how was it measured, and when did it start?"      No fever.    8. VOMITING: "Is there any vomiting?" If yes, ask: "How many times?"      No vomiting.    9. BLEEDING: "Is there any bleeding?" If so, ask: "How much?" and "Where?"      No bleeding.    10. OTHER SYMPTOMS: "Do you have any other symptoms?" (e.g., drainage from wound, painful urination, constipation)        No.    Protocols used: ST POST-OP SYMPTOMS AND " [id number]

## 2017-04-26 NOTE — OP NOTE
Lumbar Medial nerve branch block Coolief thermal radiofrequency ablation Under Fluoroscopy     Time-out taken to identify patient and procedure side prior to starting the procedure.     04/26/2017    PROCEDURE: Left  Coolief thermal radiofrequency ablation of the the medial branch nerves at the   transverse process of L3,  L4, L5 and sacral ala     2)Conscious sedation provided by MD     REASON FOR PROCEDURE: Facet arthropathy     PHYSICIAN: Lina Wagner MD     ASSISTANTS: None     MEDICATIONS INJECTED: 0.25% Bupivicaine total 8 mL     LOCAL ANESTHETIC USED: Xylocaine 1% 1mL / site     ESTIMATED BLOOD LOSS: None.     COMPLICATIONS: None.     Interval history: Patient reports that he had complete relief of pain for the day of the procedure, we will proceed with the RFA     TECHNIQUE: Laying in a prone position, the patient was prepped and draped in the usual sterile fashion using ChloraPrep and fenestrated drape. The level was determined under fluoroscopic guidance. Local anesthetic was given by going down to the hub of the 27-gauge 1.25in needle and raising a wheel. A 17-gauge 150mm length, active tip needle was introduced to the anatomic location of the medial branch at each of the above levels using fluoroscopy. Then motor testing was performed to confirm that the needle tips were in the correct location. Then after negative aspiration, 1 mL of 0.25% bupivacaine was injected into each level. This was followed by thermal lesioning at 60 degrees celsius for 150 seconds.     Conscious sedation provided by M.D   The patient was monitored with continuous pulse oximetry, EKG, and intermittent blood pressure monitors. The patient was hemodynamically stable throughout the entire process was responsive to voice, and breathing spontaneously. Supplemental O2 was provided at 2L/min via nasal cannula. Patient was comfortable for the duration of the procedure.     There was a total of 2mg IV Midazolam and 100 mcg Fentanyl  titrated for the procedure    The patient tolerated the procedure well. Was able to move their leg at the knee and ankle at the conclusion of the procedure    The patient was monitored after the procedure. Patient was given post procedure and discharge instructions to follow at home. We will see the patient back in two weeks or the patient may call to inform of status. The patient was discharged in a stable condition

## 2017-04-26 NOTE — IP AVS SNAPSHOT
[facility], [place], operated by [facility]  [address] [place], [place] [id number]  Phone: [phone]           Patient Discharge Instructions   Our goal is to set you up for success. This packet includes information on your condition, medications, and your home care.  It will help you care for yourself to prevent having to return to the hospital.     Please ask your nurse if you have any questions.      There are many details to remember when preparing to leave the hospital. Here is what you will need to do:    1. Take your medicine. If you are prescribed medications, review your Medication List on the following pages. You may have new medications to  at the pharmacy and others that you'll need to stop taking. Review the instructions for how and when to take your medications. Talk with your doctor or nurses if you are unsure of what to do.     2. Go to your follow-up appointments. Specific follow-up information is listed in the following pages. Your may be contacted by a nurse or clinical provider about future appointments. Be sure we have all of the phone numbers to reach you. Please contact your provider's office if you are unable to make an appointment.     3. Watch for warning signs. Your doctor or nurse will give you detailed warning signs to watch for and when to call for assistance. These instructions may also include educational information about your condition. If you experience any of warning signs to your health, call your doctor.           [facility]  Unless otherwise directed by your provider, please   contact [facility], our nurse care line   that is available for 24/7 assistance.     [phone] (toll-free)     Registered nurses in the [facility]   provide: appointment scheduling, clinical advisement, health education, and other advisory services.                  ** Verify the list of medication(s) below is accurate and up to date. Carry this with you in case of  emergency. If your medications have changed, please notify your healthcare provider.             Medication List      CONTINUE taking these medications        Additional Info                      albuterol 90 mcg/actuation inhaler   Quantity:  1 Inhaler   Refills:  6    Instructions:  Take 2 puffs every 4-6 hours  as needed for shortness of breath/wheezing     Begin Date    AM    Noon    PM    Bedtime       aspirin 81 MG EC tablet   Commonly known as:  ECOTRIN   Refills:  0   Dose:  1 tablet    Instructions:  Take 1 tablet by mouth every morning. prevents heart attacks and strokes     Begin Date    AM    Noon    PM    Bedtime       atorvastatin 20 MG tablet   Commonly known as:  LIPITOR   Quantity:  30 tablet   Refills:  6   Dose:  20 mg    Instructions:  Take 1 tablet (20 mg total) by mouth once daily.     Begin Date    AM    Noon    PM    Bedtime       blood sugar diagnostic Strp   Quantity:  100 each   Refills:  6    Instructions:  Freestyle light strips and lancets     Begin Date    AM    Noon    PM    Bedtime       celecoxib 200 MG capsule   Commonly known as:  CeleBREX   Quantity:  30 capsule   Refills:  2    Instructions:  ONE CAPSULE DAILY     Begin Date    AM    Noon    PM    Bedtime       econazole nitrate 1 % cream   Quantity:  30 g   Refills:  2    Instructions:  Apply topically 2 (two) times daily.     Begin Date    AM    Noon    PM    Bedtime       ergocalciferol 50,000 unit Cap   Commonly known as:  ERGOCALCIFEROL   Quantity:  24 capsule   Refills:  0   Dose:  59022 Units    Instructions:  Take 1 capsule (50,000 Units total) by mouth twice a week.     Begin Date    AM    Noon    PM    Bedtime       fluconazole 150 MG Tab   Commonly known as:  DIFLUCAN   Quantity:  3 tablet   Refills:  3    Instructions:  One daily for 3 days     Begin Date    AM    Noon    PM    Bedtime       insulin detemir 100 unit/mL (3 mL) Inpn pen   Commonly known as:  LEVEMIR FLEXPEN   Quantity:  1 Box   Refills:  11   Dose:  20  Units   Indications:  type 2 diabetes mellitus    Instructions:  Inject 20 Units into the skin every evening.     Begin Date    AM    Noon    PM    Bedtime       insulin lispro 100 unit/mL injection   Commonly known as:  HUMALOG   Quantity:  3 mL   Refills:  11   Dose:  11 Units    Instructions:  Inject 11 Units into the skin 3 (three) times daily before meals. Glucose      Intervention  (units to add in addition to meal time Humalog base dose of 11 Units)                    0-80 orange juice      0 units 151 - 200     +2 Units 201 - 250     +4 Units 251 - 300     +6 Units 301 - 350      +8 Units 351 - 400     +10 Units > 400        Call MD     Begin Date    AM    Noon    PM    Bedtime       lisinopril 2.5 MG tablet   Commonly known as:  PRINIVIL,ZESTRIL   Quantity:  30 tablet   Refills:  6    Instructions:  One daily for proteinuria.     Begin Date    AM    Noon    PM    Bedtime       metformin 500 MG 24 hr tablet   Commonly known as:  GLUCOPHAGE-XR   Quantity:  360 tablet   Refills:  6   Dose:  1000 mg    Instructions:  Take 2 tablets (1,000 mg total) by mouth 2 (two) times daily.     Begin Date    AM    Noon    PM    Bedtime       omeprazole 20 MG capsule   Commonly known as:  PRILOSEC   Quantity:  30 capsule   Refills:  6   Dose:  20 mg    Instructions:  Take 1 capsule (20 mg total) by mouth every morning.     Begin Date    AM    Noon    PM    Bedtime       oxycodone-acetaminophen  mg per tablet   Commonly known as:  PERCOCET   Quantity:  120 tablet   Refills:  0   Dose:  1 tablet    Instructions:  Take 1 tablet by mouth every 6 (six) hours as needed for Pain.     Begin Date    AM    Noon    PM    Bedtime       vitamin D 1000 units Tab   Refills:  0   Dose:  5000 mg    Instructions:  Take 5,000 mg by mouth once daily.     Begin Date    AM    Noon    PM    Bedtime                  Please bring to all follow up appointments:    1. A copy of your discharge instructions.  2. All medicines you are  currently taking in their original bottles.  3. Identification and insurance card.    Please arrive 15 minutes ahead of scheduled appointment time.    Please call 24 hours in advance if you must reschedule your appointment and/or time.        Your Scheduled Appointments     Apr 27, 2017  8:00 AM CDT   [facility] Established Patient with MD Tom Maldonado - OB/GYN 5th Floor ([facility] )    [address]  [place] [id number] [phone]            Apr 28, 2017  9:00 AM CDT   Established Occupational Therapy with Meg Bishop, OT   [facility] ([facility])    [address]  [place] [address] [phone]            May 02, 2017  9:00 AM CDT   Established Occupational Therapy with Meg Bishop, OT   [facility] ([facility])    [address]  [place] [address] [phone]            May 04, 2017  9:00 AM CDT   Established Occupational Therapy with Meg Bishop, OT   [facility] ([facility])    [address]  [place] [address] [phone]            May 05, 2017  8:00 AM CDT   Physical with Clarisse Richardson MD   [facility] [address] ([facility])    [address]  [address]  [facility] [address]   [phone]              Your Future Surgeries/Procedures     May 09, 2017   Surgery with Lina Wagner MD   [facility] ([facility])    [address]  [place] [address] [phone]                  Discharge Instructions       Home Care Instructions Pain Management:    1. DIET:   You may resume your normal diet today.   2. BATHING:   You may shower with luke warm water.  3. DRESSING:   You may remove your bandage today.   4. ACTIVITY LEVEL:   You may resume your normal activities 24 hrs after your procedure.  5. MEDICATIONS:   You may resume your normal  "medications today.   6. SPECIAL INSTRUCTIONS:   No heat to the injection site for 24 hrs including, bath or shower, heating pad, moist heat, or hot tubs.    Use ice pack to injection site for any pain or discomfort.  Apply ice packs for 20 minute intervals as needed.   If you have received any sedatives by mouth today you may not drive for 12 hours.    If you have received any sedation through your IV, you may not drive for 24 hrs.     PLEASE CALL YOUR DOCTOR IF:  1. Redness or swelling around the injection site.  2. Fever of 101 degrees  3. Drainage (pus) from the injection site.  4. For any continuous bleeding (some dried blood over the incision is normal.)    FOR EMERGENCIES:   If any unusual problems or difficulties occur during clinic hours, call [phone] or [phone].         Admission Information     Date & Time Provider Department CSN    4/26/2017 12:32 PM Lina Wagner MD [facility] [id number]      Care Providers     Provider Role Specialty Primary office phone    Lina Wagner MD Attending Provider Pain Medicine [phone]    Lina Wagner MD Surgeon  Pain Medicine [phone]      Your Vitals Were     BP Pulse Temp Resp Height Weight    131/63 70 98.8 °F (37.1 °C) (Oral) 18 5' 5" (1.651 m) 149.7 kg (330 lb)    SpO2 BMI             95% 54.91 kg/m2         Recent Lab Values        10/17/2014 10/28/2014 4/1/2015 7/6/2015 10/6/2015 1/25/2016 6/17/2016 12/15/2016     10:00 AM  9:15 AM  9:17 AM  9:28 AM  9:53 AM 10:40 AM 10:15 AM  9:25 AM    A1C 6.8 (H) 6.9 (H) 8.8 (H) 7.6 (H) 7.8 (H) 9.6 (H) 9.0 (H) 10.6 (H)    Comment for A1C at  9:25 AM on 12/15/2016:  According to ADA guidelines, hemoglobin A1C <7.0% represents  optimal control in non-pregnant diabetic patients.  Different  metrics may apply to specific populations.   Standards of Medical Care in Diabetes - 2016.  For the purpose of screening for the presence of diabetes:  <5.7%     Consistent with the absence of " diabetes  5.7-6.4%  Consistent with increasing risk for diabetes   (prediabetes)  >or=6.5%  Consistent with diabetes  Currently no consensus exists for use of hemoglobin A1C  for diagnosis of diabetes for children.        Allergies as of 4/26/2017     No Known Allergies      Advance Directives     An advance directive is a document which, in the event you are no longer able to make decisions for yourself, tells your healthcare team what kind of treatment you do or do not want to receive, or who you would like to make those decisions for you.  If you do not currently have an advance directive, [facility] encourages you to create one.  For more information call:  [phone] ([phone]), [phone] ([phone]),  or log on to [email or web address].        Language Assistance Services     ATTENTION: Language assistance services are available, free of charge. Please call [phone].      ATENCIÓN: Si habla español, tiene a abad disposición servicios gratuitos de asistencia lingüística. Llame al [phone].     CHÚ Ý: N?u b?n nói Ti?ng Vi?t, có các d?ch v? h? tr? ngôn ng? mi?n phí dành cho b?n. G?i s? [phone].        Diabetes Discharge Instructions                                   [facility] Sign-Up     Activating your [facility] account is as easy as 1-2-3!     1) Visit [email or web address], select Sign Up Now, enter this activation code and your date of birth, then select Next.  [email or web address]  Expires: 5/26/2017  9:15 AM      2) Create a username and password to use when you visit [facility] in the future and select a security question in case you lose your password and select Next.    3) Enter your e-mail address and click Sign Up!    Additional Information  If you have questions, please e-mail [email or web address] or call [phone] to talk to our [facility] staff. Remember, [facility] is NOT to be used for urgent needs. For medical emergencies, dial 911.          [facility]  complies with applicable Federal civil rights laws and does not discriminate on the basis of race, color, national origin, age, disability, or sex.

## 2017-04-26 NOTE — DISCHARGE INSTRUCTIONS

## 2017-04-26 NOTE — DISCHARGE SUMMARY
Discharge Note  Short Stay      SUMMARY     Admit Date: 4/26/2017    Attending Physician: Lina Wagner      Discharge Physician: Lina Wagner      Discharge Date: 4/26/2017 2:04 PM     Lumbar Medial nerve branch block Coolief thermal radiofrequency ablation Under Fluoroscopy     Time-out taken to identify patient and procedure side prior to starting the procedure.     04/26/2017    PROCEDURE: Left  Coolief thermal radiofrequency ablation of the the medial branch nerves at the   transverse process of L3,  L4, L5 and sacral ala     2)Conscious sedation provided by MD     REASON FOR PROCEDURE: Facet arthropathy    Disposition: Home or self care    Patient Instructions:   Current Discharge Medication List      CONTINUE these medications which have NOT CHANGED    Details   aspirin (ECOTRIN) 81 MG EC tablet Take 1 tablet by mouth every morning. prevents heart attacks and strokes      atorvastatin (LIPITOR) 20 MG tablet Take 1 tablet (20 mg total) by mouth once daily.  Qty: 30 tablet, Refills: 6    Associated Diagnoses: Hyperlipemia      blood sugar diagnostic Strp Freestyle light strips and lancets  Qty: 100 each, Refills: 6    Associated Diagnoses: Type 2 diabetes mellitus, uncontrolled      celecoxib (CELEBREX) 200 MG capsule ONE CAPSULE DAILY  Qty: 30 capsule, Refills: 2    Associated Diagnoses: Knee pain, bilateral      ergocalciferol (ERGOCALCIFEROL) 50,000 unit Cap Take 1 capsule (50,000 Units total) by mouth twice a week.  Qty: 24 capsule, Refills: 0    Associated Diagnoses: Vitamin D deficiency      fluconazole (DIFLUCAN) 150 MG Tab One daily for 3 days  Qty: 3 tablet, Refills: 3      insulin detemir (LEVEMIR FLEXPEN) 100 unit/mL (3 mL) SubQ InPn pen Inject 20 Units into the skin every evening.  Qty: 1 Box, Refills: 11    Associated Diagnoses: Type 2 diabetes mellitus, uncontrolled      insulin lispro (HUMALOG) 100 unit/mL injection Inject 11 Units into the skin 3 (three) times daily before meals. Glucose         Intervention  (units to add in addition to meal time Humalog base dose of 11 Units)                        0-80    orange juice             0 units     151 - 200       +2 Units     201 - 250       +4 Units     251 - 300       +6 Units     301 - 350        +8 Units     351 - 400       +10 Units     > 400           Call MD  Qty: 3 mL, Refills: 11      lisinopril (PRINIVIL,ZESTRIL) 2.5 MG tablet One daily for proteinuria.  Qty: 30 tablet, Refills: 6    Associated Diagnoses: Proteinuria      metformin (GLUCOPHAGE-XR) 500 MG 24 hr tablet Take 2 tablets (1,000 mg total) by mouth 2 (two) times daily.  Qty: 360 tablet, Refills: 6    Associated Diagnoses: Type 2 diabetes mellitus, uncontrolled      omeprazole (PRILOSEC) 20 MG capsule Take 1 capsule (20 mg total) by mouth every morning.  Qty: 30 capsule, Refills: 6    Associated Diagnoses: Gastroesophageal reflux disease without esophagitis      oxycodone-acetaminophen (PERCOCET)  mg per tablet Take 1 tablet by mouth every 6 (six) hours as needed for Pain.  Qty: 120 tablet, Refills: 0      vitamin D 185 MG Tab Take 5,000 mg by mouth once daily.      albuterol 90 mcg/actuation inhaler Take 2 puffs every 4-6 hours  as needed for shortness of breath/wheezing  Qty: 1 Inhaler, Refills: 6    Associated Diagnoses: Asthma, well controlled, mild intermittent      econazole nitrate 1 % cream Apply topically 2 (two) times daily.  Qty: 30 g, Refills: 2             Resume home diet and activity

## 2017-04-27 ENCOUNTER — OFFICE VISIT (OUTPATIENT)
Dept: OBSTETRICS AND GYNECOLOGY | Facility: CLINIC | Age: 52
End: 2017-04-27
Payer: MEDICARE

## 2017-04-27 VITALS
HEIGHT: 65 IN | WEIGHT: 293 LBS | DIASTOLIC BLOOD PRESSURE: 88 MMHG | BODY MASS INDEX: 48.82 KG/M2 | SYSTOLIC BLOOD PRESSURE: 124 MMHG

## 2017-04-27 DIAGNOSIS — E78.5 HYPERLIPIDEMIA, UNSPECIFIED HYPERLIPIDEMIA TYPE: ICD-10-CM

## 2017-04-27 DIAGNOSIS — Z01.419 ENCOUNTER FOR GYNECOLOGICAL EXAMINATION WITHOUT ABNORMAL FINDING: ICD-10-CM

## 2017-04-27 DIAGNOSIS — E66.01 MORBID OBESITY, UNSPECIFIED OBESITY TYPE: ICD-10-CM

## 2017-04-27 DIAGNOSIS — J45.20 MILD INTERMITTENT ASTHMA WITHOUT COMPLICATION: Primary | ICD-10-CM

## 2017-04-27 DIAGNOSIS — Z12.31 VISIT FOR SCREENING MAMMOGRAM: ICD-10-CM

## 2017-04-27 DIAGNOSIS — E11.65 UNCONTROLLED TYPE 2 DIABETES MELLITUS WITH COMPLICATION, UNSPECIFIED LONG TERM INSULIN USE STATUS: ICD-10-CM

## 2017-04-27 DIAGNOSIS — E11.8 UNCONTROLLED TYPE 2 DIABETES MELLITUS WITH COMPLICATION, UNSPECIFIED LONG TERM INSULIN USE STATUS: ICD-10-CM

## 2017-04-27 PROCEDURE — G0101 CA SCREEN;PELVIC/BREAST EXAM: HCPCS | Mod: S$PBB,,, | Performed by: OBSTETRICS & GYNECOLOGY

## 2017-04-27 PROCEDURE — 99999 PR PBB SHADOW E&M-EST. PATIENT-LVL III: CPT | Mod: PBBFAC,,, | Performed by: OBSTETRICS & GYNECOLOGY

## 2017-04-27 PROCEDURE — 99213 OFFICE O/P EST LOW 20 MIN: CPT | Mod: PBBFAC | Performed by: OBSTETRICS & GYNECOLOGY

## 2017-04-27 NOTE — PROGRESS NOTES
HISTORY OF PRESENT ILLNESS:    Alivia Thomas is a 51 y.o. female, , Patient's last menstrual period was 2017 (exact date).,  presents for a routine exam and has no complaints.  Cycles every 2-3 months, light in flow.     Past Medical History:   Diagnosis Date    Asthma     Diabetes mellitus type II     DJD (degenerative joint disease) of knee 2014    Hyperlipidemia     Morbid obesity     Sleep apnea        Past Surgical History:   Procedure Laterality Date    CARPAL TUNNEL RELEASE      CARPAL TUNNEL RELEASE      left    CARPAL TUNNEL RELEASE      right    JOINT REPLACEMENT Bilateral     with 2 revisions on rt    KNEE SURGERY  3/2010    orthroscope    KNEE SURGERY  14    left TKR       MEDICATIONS AND ALLERGIES:      Current Outpatient Prescriptions:     albuterol 90 mcg/actuation inhaler, Take 2 puffs every 4-6 hours  as needed for shortness of breath/wheezing, Disp: 1 Inhaler, Rfl: 6    aspirin (ECOTRIN) 81 MG EC tablet, Take 1 tablet by mouth every morning. prevents heart attacks and strokes, Disp: , Rfl:     atorvastatin (LIPITOR) 20 MG tablet, Take 1 tablet (20 mg total) by mouth once daily., Disp: 30 tablet, Rfl: 6    blood sugar diagnostic Strp, Freestyle light strips and lancets, Disp: 100 each, Rfl: 6    celecoxib (CELEBREX) 200 MG capsule, ONE CAPSULE DAILY, Disp: 30 capsule, Rfl: 2    econazole nitrate 1 % cream, Apply topically 2 (two) times daily., Disp: 30 g, Rfl: 2    ergocalciferol (ERGOCALCIFEROL) 50,000 unit Cap, Take 1 capsule (50,000 Units total) by mouth twice a week., Disp: 24 capsule, Rfl: 0    fluconazole (DIFLUCAN) 150 MG Tab, One daily for 3 days, Disp: 3 tablet, Rfl: 3    insulin detemir (LEVEMIR FLEXPEN) 100 unit/mL (3 mL) SubQ InPn pen, Inject 20 Units into the skin every evening., Disp: 1 Box, Rfl: 11    insulin lispro (HUMALOG) 100 unit/mL injection, Inject 11 Units into the skin 3 (three) times daily before meals. Glucose         Intervention  (units to add in addition to meal time Humalog base dose of 11 Units)                       0-80    orange juice            0 units    151 - 200       +2 Units    201 - 250       +4 Units    251 - 300       +6 Units    301 - 350        +8 Units    351 - 400       +10 Units    > 400           Call MD, Disp: 3 mL, Rfl: 11    lisinopril (PRINIVIL,ZESTRIL) 2.5 MG tablet, One daily for proteinuria., Disp: 30 tablet, Rfl: 6    metformin (GLUCOPHAGE-XR) 500 MG 24 hr tablet, Take 2 tablets (1,000 mg total) by mouth 2 (two) times daily., Disp: 360 tablet, Rfl: 6    omeprazole (PRILOSEC) 20 MG capsule, Take 1 capsule (20 mg total) by mouth every morning., Disp: 30 capsule, Rfl: 6    oxycodone-acetaminophen (PERCOCET)  mg per tablet, Take 1 tablet by mouth every 6 (six) hours as needed for Pain., Disp: 120 tablet, Rfl: 0    vitamin D 185 MG Tab, Take 5,000 mg by mouth once daily., Disp: , Rfl:   No current facility-administered medications for this visit.     Review of patient's allergies indicates:  No Known Allergies    Family History   Problem Relation Age of Onset    Diabetes Mother     Hypertension Mother     Cataracts Mother     Diabetes Father     Cataracts Father     Coronary artery disease Brother     Amblyopia Neg Hx     Blindness Neg Hx     Cancer Neg Hx     Glaucoma Neg Hx     Macular degeneration Neg Hx     Retinal detachment Neg Hx     Strabismus Neg Hx     Stroke Neg Hx     Thyroid disease Neg Hx        Social History     Social History    Marital status: Single     Spouse name: N/A    Number of children: N/A    Years of education: N/A     Occupational History    Not on file.     Social History Main Topics    Smoking status: Never Smoker    Smokeless tobacco: Never Used    Alcohol use Yes      Comment: occasionally     Drug use: No    Sexual activity: Yes     Partners: Female     Birth control/ protection: None     Other Topics Concern    Not on file  "    Social History Narrative    Disabled. The patient is the youngest of 6 siblings. Single. Lives with single-sex partner.                        COMPREHENSIVE GYN HISTORY:  PAP History: Denies abnormal Paps.  Infection History: Denies STDs. Denies PID.  Benign History: Denies uterine fibroids. Denies ovarian cysts. Denies endometriosis. Denies other conditions.  Cancer History: Denies cervical cancer. Denies uterine cancer or hyperplasia. Denies ovarian cancer. Denies vulvar cancer or pre-cancer. Denies vaginal cancer or pre-cancer. Denies breast cancer. Denies colon cancer.  Sexual Activity History: Reports currently being sexually active  Menstrual History: Monthly. Mod then light flow.   Dysmenorrhea History: Reports mild dysmenorrhea.       ROS:  GENERAL: No weight changes. No swelling. No fatigue. No fever.  CARDIOVASCULAR: No chest pain. No shortness of breath. No leg cramps.   NEUROLOGICAL: No headaches. No vision changes.  BREASTS: No pain. No lumps. No discharge.  ABDOMEN: No pain. No nausea. No vomiting. No diarrhea. No constipation.  REPRODUCTIVE: No abnormal bleeding.   VULVA: No pain. No lesions. No itching.  VAGINA: No relaxation. No itching. No odor. No discharge. No lesions.  URINARY: No incontinence. No nocturia. No frequency. No dysuria.    /88  Ht 5' 5" (1.651 m)  Wt (!) 155.6 kg (343 lb 0.6 oz)  LMP 03/13/2017 (Exact Date)  BMI 57.08 kg/m2    PE:  APPEARANCE: Well nourished, well developed, in no acute distress.  AFFECT: WNL, alert and oriented x 3.  SKIN: No acne or hirsutism.  NECK: Neck symmetric, without masses or thyromegaly.  NODES: No inguinal, cervical, axillary or femoral lymph node enlargement.  CHEST: Good respiratory effort.   ABDOMEN: Soft. No tenderness or masses. No hepatosplenomegaly. No hernias.  BREASTS: Symmetrical, no skin changes, visible lesions, palpable masses or nipple discharge bilaterally.  PELVIC: External female genitalia without lesions.  Female hair " distribution. Adequate perineal body, Normal urethral meatus. Vagina moist and well rugated without lesions or discharge.  No significant cystocele or rectocele present. Cervix pink without lesions, discharge or tenderness. Uterus is enlarged, assessment of size limited secondary to body habitus. Adnexa without masses or tenderness.  EXTREMITIES: No edema    DIAGNOSIS:  1. Mild intermittent asthma without complication    2. Uncontrolled type 2 diabetes mellitus with complication, unspecified long term insulin use status    3. Morbid obesity, unspecified obesity type    4. Hyperlipidemia, unspecified hyperlipidemia type    5. Encounter for gynecological examination without abnormal finding    6. Visit for screening mammogram        PLAN:    Orders Placed This Encounter    Mammo Digital Screening Bilat With CAD       COUNSELING:  The patient was counseled today on:  -A.C.S. Pap and pelvic exam guidelines (pap every 3 years), recomendations for yearly mammogram;  -to follow up with her PCP for other health maintenance.    FOLLOW-UP with me in 6 months   Patient to get pelvic US ordered last year

## 2017-04-28 ENCOUNTER — TELEPHONE (OUTPATIENT)
Dept: PAIN MEDICINE | Facility: CLINIC | Age: 52
End: 2017-04-28

## 2017-04-28 ENCOUNTER — CLINICAL SUPPORT (OUTPATIENT)
Dept: REHABILITATION | Facility: HOSPITAL | Age: 52
End: 2017-04-28
Attending: ORTHOPAEDIC SURGERY
Payer: MEDICARE

## 2017-04-28 DIAGNOSIS — M79.602 LEFT ARM PAIN: ICD-10-CM

## 2017-04-28 DIAGNOSIS — M25.512 ACUTE PAIN OF LEFT SHOULDER: ICD-10-CM

## 2017-04-28 PROCEDURE — 97110 THERAPEUTIC EXERCISES: CPT | Mod: PO

## 2017-04-28 PROCEDURE — G8984 CARRY CURRENT STATUS: HCPCS | Mod: CI,PO

## 2017-04-28 PROCEDURE — G8985 CARRY GOAL STATUS: HCPCS | Mod: CI,PO

## 2017-04-28 NOTE — TELEPHONE ENCOUNTER
Spoke with patient regarding pain status since RFA procedure, pt stated that she is felling much better since having the procedure done

## 2017-04-28 NOTE — PROGRESS NOTES
"OT Daily Progress Note    Patient:  Alivia Marie Cyr  Clinic #:  [id number]   Date of Note: 04/28/2017   Referring Physician:  Velma Garcia, *  Diagnosis:  Shoulder Pain/Bicep Tendonitis/Shoulder Impingement  Encounter Diagnoses   Name Primary?    Left arm pain     Acute pain of left shoulder         Start Time: 10am  End Time: 11am  Total Time: 60 min  Group Time: 20    Visit 6 of 20 authorized  MEDICARE/DASH visit #6    Subjective:   "I really am feeling better. This is really helping alot"  Pain: 2 out of 10     Objective:   Patient seen by OT this session. Treatment  consist of the following:  Therapeutic exercises    Patient received MH x 5 min to L to increase blood flow, circulation and tissue elasticity prior to therex. Patient received ultrasound to anterior shoulder area to increase blood flow, circulation, tissue elasticity, pain management and for wound/scar management for 8 minutes @ 1.3 Mhz, Intensity 1.0 w/cm2 at 100* duty cycle. Pt participated in UBE task at level 1 for improved blood flow to shoulder, shoulder endurance, and shoulder ROM without impingement x 6 (3 forward/ 3 backward) minutes. Patient performed seated pulleys for flexion and abduction x 3 min each direction for stretching and to increase shoulder ROM and mobility. Patient instructed and performed upgraded green t-band for rowing (scapular retraction with AB/ER) and shoulder extension x 15 reps each for scapular strengthening with emphasis on avoiding excessive upper trap compensation also added 6 short with orange t band. Instructed on and performed eccentric bicep strengthening with 3# weights with 10 second hold x 15 reps for strengthening of muscles of longest length. Patient received cold pack x 5 minutes to decrease pain/inflammation and edema following treatment session. Pt received CFM to LH of bicep origin to trigger an inflammatory healing response and stimulate the production of new collagen and proper, more " functional, less painful healing.    Reassessment as follows (4/28/17):    UE Range of Motion  Active Range of Motion:   Shoulder Left - Eval Left - 4/28/17   Flexion 155 165   Abduction 150 168   ER at 0 80 80   ER at 90 70 80   IR 60 60   Horz Adduct 20 30      ROM Comments:   Concentric pain   Pain at end range      Painful Arc:   None - resolved      Special Tests:  AC Joint Left   AC Joint Compression Test Mild Positive   Empty Can Test Negative    Subscaputlaris Lift Off Negative    O'leona's test Negative    Hawkin's Kenndy Negative    Speed's test Negative for pain, weakness         Palpation: TTP at belly of supraspinatus as compared to both origin and insertion      Sensation: Grossly Intact         Scapular Control/Dyskinesis:     Normal / Subtle / Obvious  Comments    Left  Subtle Elevated and abducted scapula   Right  WNL WNL         OUTCOME MEASURE:FOTO     Category: Carrying, Handling, Moving Objects       Current Score = 97% or 3% impaired  Goal at Discharge Score = 71% or 29% impaired    Score interpretation is as follows:   TEST SCORE Modifier Impairment Limitation Restriction   0% CH 0 % impaired, limited or restricted   1-19% CI @ least 1% but less than 20% impaired, limited or restricted   20-39% CJ @ least 20%<40% impaired, limited or restricted   40-59% CK @ least 40%<60% impaired, limited or restricted   60-79% CL @ least 60% <80% impaired, limited or restricted   80-99% CM @ least 80%<100% impaired limited or restricted   100% % impaired, limited or restricted             Treatment: Ultrasound x 8 min, Therapeutic exercises x 35 min and Manual x 10 min     Assessment:  Pt will continue to benefit from skilled OT intervention.  She has met 5 out of 6 goals at this time and is progressing well toward remaining goals. She continues to have cathy residual pain but overall pain with functional tasks has improved. She continues to have some decreased strength and relies on compensation from  various shoulder musculature.  Patient continues to demonstrate limitation  with  ROM, Decreased functional use, Decreased strength, Continued pain and Continued inflammation all of which interfere with vocational and leisurely pursuits.       New/Revised Goals: Continue POC  1) Independent with HEP-----met  2) Pt will demonstrate at least 160 degrees of motion in left shoulder abduction plane of movement for improved participation in house keeping tasks including reaching into cabinets to get supplies. -----met  3) Pt will demonstrate negative provocative testing for HK, Obriens, and Empty Can.-----met  4) Independent and pain free with ADL's and IADL's-----mostly met  5) Patient to score at 70% or more on FOTO to demonstrate improved perception of functional LUE use.-----met  6) Patient will report at least 1-2 out of 10 on VAS for improved overall function and decreased pain.-----met      Plan:  Pt will be treated by occupational therapy 2 times a week for 6 weeks for Pt Education, HEP, therapeutic exercises, neuromuscular re-education, joint mobilizations, modalities as well as any other treatments deemed necessary based on the patient's needs or progress.

## 2017-05-05 ENCOUNTER — HOSPITAL ENCOUNTER (OUTPATIENT)
Dept: RADIOLOGY | Facility: OTHER | Age: 52
Discharge: HOME | End: 2017-05-05
Attending: OBSTETRICS & GYNECOLOGY
Payer: MEDICARE

## 2017-05-05 ENCOUNTER — TELEPHONE (OUTPATIENT)
Dept: OBSTETRICS AND GYNECOLOGY | Facility: CLINIC | Age: 52
End: 2017-05-05

## 2017-05-05 ENCOUNTER — CLINICAL SUPPORT (OUTPATIENT)
Dept: REHABILITATION | Facility: HOSPITAL | Age: 52
End: 2017-05-05
Attending: ORTHOPAEDIC SURGERY
Payer: MEDICARE

## 2017-05-05 ENCOUNTER — OFFICE VISIT (OUTPATIENT)
Dept: INTERNAL MEDICINE | Facility: CLINIC | Age: 52
End: 2017-05-05
Payer: MEDICARE

## 2017-05-05 VITALS
DIASTOLIC BLOOD PRESSURE: 68 MMHG | HEART RATE: 98 BPM | WEIGHT: 293 LBS | SYSTOLIC BLOOD PRESSURE: 109 MMHG | HEIGHT: 65 IN | BODY MASS INDEX: 48.82 KG/M2

## 2017-05-05 DIAGNOSIS — Z12.31 VISIT FOR SCREENING MAMMOGRAM: ICD-10-CM

## 2017-05-05 DIAGNOSIS — R80.9 PROTEINURIA, UNSPECIFIED TYPE: ICD-10-CM

## 2017-05-05 DIAGNOSIS — R74.01 NONSPECIFIC ELEVATION OF LEVELS OF TRANSAMINASE AND LACTIC ACID DEHYDROGENASE (LDH): ICD-10-CM

## 2017-05-05 DIAGNOSIS — D25.9 UTERINE LEIOMYOMA, UNSPECIFIED LOCATION: ICD-10-CM

## 2017-05-05 DIAGNOSIS — D64.9 ANEMIA, UNSPECIFIED TYPE: Primary | ICD-10-CM

## 2017-05-05 DIAGNOSIS — A64 STD (SEXUALLY TRANSMITTED DISEASE): ICD-10-CM

## 2017-05-05 DIAGNOSIS — Z11.4 ENCOUNTER FOR SCREENING FOR HIV: ICD-10-CM

## 2017-05-05 DIAGNOSIS — M25.512 ACUTE PAIN OF LEFT SHOULDER: ICD-10-CM

## 2017-05-05 DIAGNOSIS — M79.602 LEFT ARM PAIN: ICD-10-CM

## 2017-05-05 DIAGNOSIS — E55.9 VITAMIN D DEFICIENCY: ICD-10-CM

## 2017-05-05 DIAGNOSIS — K21.9 GASTROESOPHAGEAL REFLUX DISEASE WITHOUT ESOPHAGITIS: ICD-10-CM

## 2017-05-05 DIAGNOSIS — R74.02 NONSPECIFIC ELEVATION OF LEVELS OF TRANSAMINASE AND LACTIC ACID DEHYDROGENASE (LDH): ICD-10-CM

## 2017-05-05 DIAGNOSIS — E78.5 HYPERLIPIDEMIA, UNSPECIFIED HYPERLIPIDEMIA TYPE: ICD-10-CM

## 2017-05-05 LAB
BILIRUB UR QL STRIP: NEGATIVE
CLARITY UR REFRACT.AUTO: CLEAR
COLOR UR AUTO: YELLOW
CREAT UR-MCNC: 77 MG/DL
GLUCOSE UR QL STRIP: ABNORMAL
HGB UR QL STRIP: NEGATIVE
KETONES UR QL STRIP: NEGATIVE
LEUKOCYTE ESTERASE UR QL STRIP: NEGATIVE
MICROALBUMIN UR DL<=1MG/L-MCNC: 3 UG/ML
MICROALBUMIN/CREATININE RATIO: 3.9 UG/MG
NITRITE UR QL STRIP: NEGATIVE
PH UR STRIP: 6 [PH] (ref 5–8)
PROT UR QL STRIP: NEGATIVE
SP GR UR STRIP: 1.01 (ref 1–1.03)
URN SPEC COLLECT METH UR: ABNORMAL
UROBILINOGEN UR STRIP-ACNC: NEGATIVE EU/DL

## 2017-05-05 PROCEDURE — 99214 OFFICE O/P EST MOD 30 MIN: CPT | Mod: S$PBB,,, | Performed by: INTERNAL MEDICINE

## 2017-05-05 PROCEDURE — 97140 MANUAL THERAPY 1/> REGIONS: CPT | Mod: PO

## 2017-05-05 PROCEDURE — 77067 SCR MAMMO BI INCL CAD: CPT | Mod: 26,,, | Performed by: RADIOLOGY

## 2017-05-05 PROCEDURE — 77067 SCR MAMMO BI INCL CAD: CPT | Mod: TC

## 2017-05-05 PROCEDURE — 76856 US EXAM PELVIC COMPLETE: CPT | Mod: TC

## 2017-05-05 PROCEDURE — 76830 TRANSVAGINAL US NON-OB: CPT | Mod: 26,,, | Performed by: RADIOLOGY

## 2017-05-05 PROCEDURE — 99999 PR PBB SHADOW E&M-EST. PATIENT-LVL III: CPT | Mod: PBBFAC,,, | Performed by: INTERNAL MEDICINE

## 2017-05-05 PROCEDURE — 99213 OFFICE O/P EST LOW 20 MIN: CPT | Mod: PBBFAC,25,PO | Performed by: INTERNAL MEDICINE

## 2017-05-05 PROCEDURE — 97110 THERAPEUTIC EXERCISES: CPT | Mod: PO

## 2017-05-05 PROCEDURE — 77063 BREAST TOMOSYNTHESIS BI: CPT | Mod: 26,,, | Performed by: RADIOLOGY

## 2017-05-05 PROCEDURE — 97035 APP MDLTY 1+ULTRASOUND EA 15: CPT | Mod: PO

## 2017-05-05 PROCEDURE — 76856 US EXAM PELVIC COMPLETE: CPT | Mod: 26,,, | Performed by: RADIOLOGY

## 2017-05-05 RX ORDER — ATORVASTATIN CALCIUM 20 MG/1
20 TABLET, FILM COATED ORAL DAILY
Qty: 30 TABLET | Refills: 6 | Status: SHIPPED | OUTPATIENT
Start: 2017-05-05 | End: 2017-12-18 | Stop reason: SDUPTHER

## 2017-05-05 RX ORDER — INSULIN LISPRO 100 [IU]/ML
11 INJECTION, SOLUTION INTRAVENOUS; SUBCUTANEOUS
Qty: 3 ML | Refills: 11 | Status: SHIPPED | OUTPATIENT
Start: 2017-05-05 | End: 2018-03-19

## 2017-05-05 RX ORDER — INSULIN LISPRO 100 [IU]/ML
INJECTION, SOLUTION INTRAVENOUS; SUBCUTANEOUS
Qty: 5 VIAL | Refills: 6 | Status: SHIPPED | OUTPATIENT
Start: 2017-05-05 | End: 2018-03-19

## 2017-05-05 RX ORDER — METFORMIN HYDROCHLORIDE 500 MG/1
1000 TABLET, EXTENDED RELEASE ORAL 2 TIMES DAILY
Qty: 360 TABLET | Refills: 6 | Status: SHIPPED | OUTPATIENT
Start: 2017-05-05 | End: 2017-12-18 | Stop reason: SDUPTHER

## 2017-05-05 RX ORDER — OMEPRAZOLE 20 MG/1
20 CAPSULE, DELAYED RELEASE ORAL EVERY MORNING
Qty: 30 CAPSULE | Refills: 6 | Status: SHIPPED | OUTPATIENT
Start: 2017-05-05 | End: 2017-12-18 | Stop reason: SDUPTHER

## 2017-05-05 RX ORDER — LISINOPRIL 2.5 MG/1
TABLET ORAL
Qty: 30 TABLET | Refills: 6 | Status: SHIPPED | OUTPATIENT
Start: 2017-05-05 | End: 2017-12-18 | Stop reason: SDUPTHER

## 2017-05-05 NOTE — PROGRESS NOTES
"OT Daily Progress Note    Patient:  Alivia Marie Cyr  Clinic #:  [id number]   Date of Note: 05/05/2017   Referring Physician:  Velma Garcia, *  Diagnosis:  Shoulder Pain/Bicep Tendonitis/Shoulder Impingement  Encounter Diagnoses   Name Primary?    Left arm pain     Acute pain of left shoulder         Start Time: 10am  End Time: 11am  Total Time: 60 min  Group Time: 20    Visit 7 of 20 authorized  MEDICARE/DASH visit #7    Subjective:   "Im feeling good, but my father is working on my nerves and its really making everything rough"  Pain: 2 out of 10     Objective:   Patient seen by OT this session. Treatment  consist of the following:  Therapeutic exercises    Patient received MH x 5 min to L to increase blood flow, circulation and tissue elasticity prior to therex. Patient received ultrasound to anterior shoulder area to increase blood flow, circulation, tissue elasticity, pain management and for wound/scar management for 8 minutes @ 1.3 Mhz, Intensity 1.0 w/cm2 at 100* duty cycle. Pt participated in UBE task at level 1 for improved blood flow to shoulder, shoulder endurance, and shoulder ROM without impingement x 6 (3 forward/ 3 backward) minutes. Patient performed seated pulleys for flexion and abduction x 3 min each direction for stretching and to increase shoulder ROM and mobility. Patient instructed and performed upgraded green t-band for rowing (scapular retraction with AB/ER) and shoulder extension x 15 reps each for scapular strengthening with emphasis on avoiding excessive upper trap compensation also added 6 short with orange t band. Instructed on and performed eccentric bicep strengthening with 3# weights with 10 second hold x 15 reps for strengthening of muscles of longest length. Patient received cold pack x 5 minutes to decrease pain/inflammation and edema following treatment session. Pt received CFM to LH of bicep origin to trigger an inflammatory healing response and stimulate the production " of new collagen and proper, more functional, less painful healing.    Reassessment on 4/28/17    Treatment: Ultrasound x 8 min, Therapeutic exercises x 35 min and Manual x 10 min     Assessment:  Pt will continue to benefit from skilled OT intervention.  She has met 5 out of 6 goals at this time and is progressing well toward remaining goals. Today she arrived with more pain, however she reports that is due to stressors at home. Will continue to progress as tolerated. She continues to have cathy residual pain but overall pain with functional tasks has improved. She continues to have some decreased strength and relies on compensation from various shoulder musculature.  Patient continues to demonstrate limitation  with  ROM, Decreased functional use, Decreased strength, Continued pain and Continued inflammation all of which interfere with vocational and leisurely pursuits.       New/Revised Goals: Continue POC  1) Independent with HEP-----met  2) Pt will demonstrate at least 160 degrees of motion in left shoulder abduction plane of movement for improved participation in house keeping tasks including reaching into cabinets to get supplies. -----met  3) Pt will demonstrate negative provocative testing for HK, Obriens, and Empty Can.-----met  4) Independent and pain free with ADL's and IADL's-----mostly met  5) Patient to score at 70% or more on FOTO to demonstrate improved perception of functional LUE use.-----met  6) Patient will report at least 1-2 out of 10 on VAS for improved overall function and decreased pain.-----met      Plan:  Pt will be treated by occupational therapy 2 times a week for 6 weeks for Pt Education, HEP, therapeutic exercises, neuromuscular re-education, joint mobilizations, modalities as well as any other treatments deemed necessary based on the patient's needs or progress.

## 2017-05-05 NOTE — TELEPHONE ENCOUNTER
----- Message from Rosa Rodriges NP sent at 5/5/2017  1:09 PM CDT -----  Regarding: normal MMG  There is no mammographic evidence of malignancy.    Mammogram in 1 year is recommended.

## 2017-05-05 NOTE — MR AVS SNAPSHOT
[facility] [address]  [address]  [address]  [facility] [address]  Phone: [phone]                  Alivia Thomas   2017 8:00 AM   Office Visit    Description:  Female : 1965   Provider:  Clarisse Richardson MD   Department:  [facility] [address]           Reason for Visit     Follow-up           Diagnoses this Visit        Comments    Anemia, unspecified type    -  Primary     Hyperlipidemia, unspecified hyperlipidemia type         Proteinuria, unspecified type         Uncontrolled type 2 diabetes mellitus without complication, with long-term current use of insulin         Gastroesophageal reflux disease without esophagitis         Vitamin D deficiency         STD (sexually transmitted disease)         Encounter for screening for HIV         Nonspecific elevation of levels of transaminase and lactic acid dehydrogenase (LDH)                To Do List           Future Appointments        Provider Department Dept Phone    2017 10:00 AM [facility] MAMMO1 [facility] [phone]    2017 1:00 PM Meg Bishop, OT [facility] [phone]    2017 8:00 AM EMILY Huggins [facility] [phone]    2017 9:00 AM KANDICE, BAPTIST [facility] [phone]    2017 8:00 AM Lina Wagner MD [facility] [phone]      Your Future Surgeries/Procedures     May 09, 2017   Surgery with Lina Wagner MD   [facility]    [address] [facility] [id number] [phone]              Goals (5 Years of Data)     None      Follow-Up and Disposition     Return in about 3 months (around 2017).       These Medications        Disp Refills Start End    atorvastatin (LIPITOR) 20 MG tablet 30 tablet 6 2017    Take 1 tablet (20 mg total) by mouth once daily. - Oral     Pharmacy: [address]  [facility] Ph #: [phone]       lisinopril (PRINIVIL,ZESTRIL) 2.5 MG tablet 30 tablet 6 5/5/2017     One daily for proteinuria.    Pharmacy: [address]  [facility] Ph #: [phone]       metformin (GLUCOPHAGE-XR) 500 MG 24 hr tablet 360 tablet 6 5/5/2017     Take 2 tablets (1,000 mg total) by mouth 2 (two) times daily. - Oral    Pharmacy: [address] Ph #: [phone]       omeprazole (PRILOSEC) 20 MG capsule 30 capsule 6 5/5/2017     Take 1 capsule (20 mg total) by mouth every morning. - Oral    Pharmacy: [address] Ph #: [phone]       insulin detemir (LEVEMIR FLEXPEN) 100 unit/mL (3 mL) SubQ InPn pen 1 Box 11 5/5/2017     Inject 20 Units into the skin every evening. - Subcutaneous    Pharmacy: [address] Ph #: [phone]       insulin lispro (HUMALOG) 100 unit/mL injection 3 mL 11 5/5/2017     Inject 11 Units into the skin 3 (three) times daily before meals. Glucose        Intervention  (units to add in addition to meal time Humalog base dose of 11 Units)                        0-80    orange juice             0 units     151 - 200       +2 Units     201 - 250       +4 Units     251 - 300       +6 Units     301 - 350        +8 Units     351 - 400       +10 Units     > 400           Call MD - Subcutaneous    Pharmacy: [address]  [facility] Ph #: [phone]         [facility] On [facility]     [facility] On Call Nurse Care Line - 24/7 Assistance  Unless otherwise directed by your provider, please contact [facility], our nurse care line that is available for 24/7 assistance.     Registered nurses in the [facility] provide: appointment scheduling, clinical advisement, health education,  and other advisory services.  Call: [phone] (toll free)               Medications           Message regarding Medications     Verify the changes and/or additions to your medication regime listed below are the same as discussed with your clinician today.  If any of these changes or additions are incorrect, please notify your healthcare provider.        STOP taking these medications     ergocalciferol (ERGOCALCIFEROL) 50,000 unit Cap Take 1 capsule (50,000 Units total) by mouth twice a week.           Verify that the below list of medications is an accurate representation of the medications you are currently taking.  If none reported, the list may be blank. If incorrect, please contact your healthcare provider. Carry this list with you in case of emergency.           Current Medications     albuterol 90 mcg/actuation inhaler Take 2 puffs every 4-6 hours  as needed for shortness of breath/wheezing    aspirin (ECOTRIN) 81 MG EC tablet Take 1 tablet by mouth every morning. prevents heart attacks and strokes    atorvastatin (LIPITOR) 20 MG tablet Take 1 tablet (20 mg total) by mouth once daily.    blood sugar diagnostic Strp Freestyle light strips and lancets    celecoxib (CELEBREX) 200 MG capsule ONE CAPSULE DAILY    econazole nitrate 1 % cream Apply topically 2 (two) times daily.    fluconazole (DIFLUCAN) 150 MG Tab One daily for 3 days    insulin detemir (LEVEMIR FLEXPEN) 100 unit/mL (3 mL) SubQ InPn pen Inject 20 Units into the skin every evening.    insulin lispro (HUMALOG) 100 unit/mL injection Inject 11 Units into the skin 3 (three) times daily before meals. Glucose        Intervention  (units to add in addition to meal time Humalog base dose of 11 Units)                        0-80    orange juice             0 units     151 - 200       +2 Units     201 - 250       +4 Units     251 - 300       +6 Units     301 - 350        +8 Units     351 - 400       +10 Units     > 400           Call MD     "lisinopril (PRINIVIL,ZESTRIL) 2.5 MG tablet One daily for proteinuria.    metformin (GLUCOPHAGE-XR) 500 MG 24 hr tablet Take 2 tablets (1,000 mg total) by mouth 2 (two) times daily.    omeprazole (PRILOSEC) 20 MG capsule Take 1 capsule (20 mg total) by mouth every morning.    oxycodone-acetaminophen (PERCOCET)  mg per tablet Take 1 tablet by mouth every 6 (six) hours as needed for Pain.    vitamin D 185 MG Tab Take 5,000 mg by mouth once daily.           Clinical Reference Information           Your Vitals Were     BP Pulse Height Weight Last Period BMI    109/68 98 5' 5" (1.651 m) 158.8 kg (350 lb) 03/13/2017 58.24 kg/m2      Blood Pressure          Most Recent Value    BP  109/68      Allergies as of 5/5/2017     No Known Allergies      Immunizations Administered on Date of Encounter - 5/5/2017     None      Orders Placed During Today's Visit      Normal Orders This Visit    Microalbumin/creatinine urine ratio     Urinalysis     Future Labs/Procedures Expected by Expires    CBC auto differential  5/5/2017 7/4/2018    Comprehensive metabolic panel  5/5/2017 5/5/2018    Ferritin  5/5/2017 5/5/2018    Hemoglobin A1c  5/5/2017 5/5/2018    Hepatitis panel, acute  5/5/2017 8/3/2017    HIV-1 and HIV-2 antibodies  5/5/2017 7/4/2018    Iron and TIBC  5/5/2017 5/5/2018    Lipid panel  5/5/2017 5/5/2018    RPR  5/5/2017 7/4/2018    TSH  5/5/2017 5/5/2018    Vitamin D  5/5/2017 5/5/2018      [facility] Sign-Up     Activating your [facility] account is as easy as 1-2-3!     1) Visit [email or web address], select Sign Up Now, enter this activation code and your date of birth, then select Next.  [email or web address]  Expires: 5/26/2017  9:15 AM      2) Create a username and password to use when you visit [facility] in the future and select a security question in case you lose your password and select Next.    3) Enter your e-mail address and click Sign Up!    Additional Information  If you have questions, please e-mail " [email or web address] or call [phone] to talk to our [facility] staff. Remember, [facility] is NOT to be used for urgent needs. For medical emergencies, dial 911.         Language Assistance Services     ATTENTION: Language assistance services are available, free of charge. Please call [phone].      ATENCIÓN: Si habla español, tiene a abad disposición servicios gratuitos de asistencia lingüística. Llame al [phone].     CHÚ Ý: N?u b?n nói Ti?ng Vi?t, có các d?ch v? h? tr? ngôn ng? mi?n phí dành cho b?n. G?i s? [phone].         [facility] [address] complies with applicable Federal civil rights laws and does not discriminate on the basis of race, color, national origin, age, disability, or sex.

## 2017-05-09 ENCOUNTER — TELEPHONE (OUTPATIENT)
Dept: INTERNAL MEDICINE | Facility: CLINIC | Age: 52
End: 2017-05-09

## 2017-05-09 ENCOUNTER — HOSPITAL ENCOUNTER (OUTPATIENT)
Facility: OTHER | Age: 52
Discharge: HOME | End: 2017-05-09
Attending: ANESTHESIOLOGY | Admitting: ANESTHESIOLOGY
Payer: MEDICARE

## 2017-05-09 ENCOUNTER — SURGERY (OUTPATIENT)
Age: 52
End: 2017-05-09

## 2017-05-09 VITALS
WEIGHT: 293 LBS | TEMPERATURE: 98 F | DIASTOLIC BLOOD PRESSURE: 68 MMHG | RESPIRATION RATE: 18 BRPM | OXYGEN SATURATION: 98 % | SYSTOLIC BLOOD PRESSURE: 125 MMHG | BODY MASS INDEX: 48.82 KG/M2 | HEART RATE: 86 BPM | HEIGHT: 65 IN

## 2017-05-09 DIAGNOSIS — M47.816 FACET ARTHRITIS OF LUMBAR REGION: Primary | ICD-10-CM

## 2017-05-09 LAB — POCT GLUCOSE: 218 MG/DL (ref 70–110)

## 2017-05-09 PROCEDURE — 64636 DESTROY L/S FACET JNT ADDL: CPT | Performed by: ANESTHESIOLOGY

## 2017-05-09 PROCEDURE — 64635 DESTROY LUMB/SAC FACET JNT: CPT | Mod: RT,,, | Performed by: ANESTHESIOLOGY

## 2017-05-09 PROCEDURE — 99152 MOD SED SAME PHYS/QHP 5/>YRS: CPT | Mod: ,,, | Performed by: ANESTHESIOLOGY

## 2017-05-09 PROCEDURE — 64635 DESTROY LUMB/SAC FACET JNT: CPT | Performed by: ANESTHESIOLOGY

## 2017-05-09 PROCEDURE — 25000003 PHARM REV CODE 250: Performed by: ANESTHESIOLOGY

## 2017-05-09 PROCEDURE — 82947 ASSAY GLUCOSE BLOOD QUANT: CPT | Performed by: ANESTHESIOLOGY

## 2017-05-09 PROCEDURE — 63600175 PHARM REV CODE 636 W HCPCS: Performed by: ANESTHESIOLOGY

## 2017-05-09 PROCEDURE — 64636 DESTROY L/S FACET JNT ADDL: CPT | Mod: RT,,, | Performed by: ANESTHESIOLOGY

## 2017-05-09 PROCEDURE — A4649 SURGICAL SUPPLIES: HCPCS | Performed by: ANESTHESIOLOGY

## 2017-05-09 RX ORDER — BUPIVACAINE HYDROCHLORIDE 2.5 MG/ML
INJECTION, SOLUTION EPIDURAL; INFILTRATION; INTRACAUDAL
Status: DISCONTINUED | OUTPATIENT
Start: 2017-05-09 | End: 2017-05-09 | Stop reason: HOSPADM

## 2017-05-09 RX ORDER — SODIUM CHLORIDE 9 MG/ML
INJECTION, SOLUTION INTRAVENOUS CONTINUOUS
Status: DISCONTINUED | OUTPATIENT
Start: 2017-05-09 | End: 2017-05-09 | Stop reason: HOSPADM

## 2017-05-09 RX ORDER — LIDOCAINE HYDROCHLORIDE 10 MG/ML
10 INJECTION INFILTRATION; PERINEURAL
Status: COMPLETED | OUTPATIENT
Start: 2017-05-09 | End: 2017-05-09

## 2017-05-09 RX ORDER — FENTANYL CITRATE 50 UG/ML
100 INJECTION, SOLUTION INTRAMUSCULAR; INTRAVENOUS ONCE
Status: DISCONTINUED | OUTPATIENT
Start: 2017-05-09 | End: 2017-05-09 | Stop reason: HOSPADM

## 2017-05-09 RX ORDER — MIDAZOLAM HYDROCHLORIDE 1 MG/ML
2 INJECTION INTRAMUSCULAR; INTRAVENOUS
Status: DISCONTINUED | OUTPATIENT
Start: 2017-05-09 | End: 2017-05-09 | Stop reason: HOSPADM

## 2017-05-09 RX ORDER — FENTANYL CITRATE 50 UG/ML
INJECTION, SOLUTION INTRAMUSCULAR; INTRAVENOUS
Status: DISCONTINUED | OUTPATIENT
Start: 2017-05-09 | End: 2017-05-09 | Stop reason: HOSPADM

## 2017-05-09 RX ORDER — BUPIVACAINE HYDROCHLORIDE 2.5 MG/ML
10 INJECTION, SOLUTION EPIDURAL; INFILTRATION; INTRACAUDAL ONCE
Status: DISCONTINUED | OUTPATIENT
Start: 2017-05-09 | End: 2017-05-09 | Stop reason: HOSPADM

## 2017-05-09 RX ORDER — OXYCODONE AND ACETAMINOPHEN 10; 325 MG/1; MG/1
1 TABLET ORAL EVERY 6 HOURS PRN
Qty: 120 TABLET | Refills: 0 | Status: SHIPPED | OUTPATIENT
Start: 2017-05-10 | End: 2017-05-25 | Stop reason: SDUPTHER

## 2017-05-09 RX ORDER — MIDAZOLAM HYDROCHLORIDE 1 MG/ML
INJECTION INTRAMUSCULAR; INTRAVENOUS
Status: DISCONTINUED | OUTPATIENT
Start: 2017-05-09 | End: 2017-05-09 | Stop reason: HOSPADM

## 2017-05-09 RX ADMIN — SODIUM CHLORIDE: 0.9 INJECTION, SOLUTION INTRAVENOUS at 08:05

## 2017-05-09 RX ADMIN — FENTANYL CITRATE 25 MCG: 50 INJECTION, SOLUTION INTRAMUSCULAR; INTRAVENOUS at 08:05

## 2017-05-09 RX ADMIN — FENTANYL CITRATE 25 MCG: 50 INJECTION, SOLUTION INTRAMUSCULAR; INTRAVENOUS at 09:05

## 2017-05-09 RX ADMIN — MIDAZOLAM HYDROCHLORIDE 1 MG: 1 INJECTION, SOLUTION INTRAMUSCULAR; INTRAVENOUS at 08:05

## 2017-05-09 RX ADMIN — LIDOCAINE HYDROCHLORIDE 20 ML: 10 INJECTION, SOLUTION INFILTRATION; PERINEURAL at 09:05

## 2017-05-09 RX ADMIN — BUPIVACAINE HYDROCHLORIDE 10 ML: 2.5 INJECTION, SOLUTION EPIDURAL; INFILTRATION; INTRACAUDAL; PERINEURAL at 09:05

## 2017-05-09 NOTE — DISCHARGE INSTRUCTIONS
Home Care Instructions Pain Management:    1. DIET:   You may resume your normal diet today.   2. BATHING:   You may shower with luke warm water. No tub baths or anything that will soak injection sites under water for 24 hours.  3. DRESSING:   You may remove your bandage today.   4. ACTIVITY LEVEL:   You may resume your normal activities 24 hrs after your procedure. Nothing strenuous today.  5. MEDICATIONS:   You may resume your normal medications today. To restart blood thinners, ask your doctor.  6. SPECIAL INSTRUCTIONS:   No heat to the injection site for 24 hrs including, bath or shower, heating pad, moist heat, or hot tubs.    Use ice pack to injection site for any pain or discomfort.  Apply ice packs for 20 minute intervals as needed.     If you have received any sedatives by mouth today you may not drive for 12 hours.    If you have received any sedation through your IV, you may not drive for 24 hrs.     PLEASE CALL YOUR DOCTOR IF:  1. Redness or swelling around the injection site.  2. Fever of 101 degrees or more  3. Drainage (pus) from the injection site.  4. For any continuous bleeding (some dried blood over the incision is normal.)    FOR EMERGENCIES:   If any unusual problems or difficulties occur during clinic hours, call [phone] or 286.

## 2017-05-09 NOTE — TELEPHONE ENCOUNTER
----- Message from Comfort Antunez MA sent at 5/9/2017  3:10 PM CDT -----  Contact: self - [phone]   Patient stated the Rx for insulin lispro (HUMALOG) 100 unit/mL injection went to the the wrong pharmacy and was supposed to be for the Flexpen and not the vial. Requesting to have the Humalog Flexpen sent to [facility] - [place], [place] - [address] [facility]. Please call. Thanks!

## 2017-05-09 NOTE — OP NOTE
Lumbar Medial nerve branch block Coolief thermal radiofrequency ablation Under Fluoroscopy     Time-out taken to identify patient and procedure side prior to starting the procedure.     05/09/2017    PROCEDURE: Right  Coolief thermal radiofrequency ablation of the the medial branch nerves at the   transverse process of L3, L4, L5 and sacral ala     2)Conscious sedation provided by MD     REASON FOR PROCEDURE: Facet arthropathy     PHYSICIAN: Lina Wagner MD     ASSISTANTS: Sammy JONES3  DELIA was present and supervising all critical portions of the procedure     MEDICATIONS INJECTED: 0.25% Bupivicaine total 8 mL     LOCAL ANESTHETIC USED: Xylocaine 1% 1mL / site     ESTIMATED BLOOD LOSS: None.     COMPLICATIONS: None.     Interval history: Patient reports that he had complete relief of pain for the day of the procedure, we will proceed with the RFA     TECHNIQUE: Laying in a prone position, the patient was prepped and draped in the usual sterile fashion using ChloraPrep and fenestrated drape. The level was determined under fluoroscopic guidance. Local anesthetic was given by going down to the hub of the 27-gauge 1.25in needle and raising a wheel. A 17-gauge active tip needle was introduced to the anatomic location of the medial branch at each of the above levels using fluoroscopy. Then motor testing was performed to confirm that the needle tips were in the correct location. Then after negative aspiration, 1 mL of 0.25% bupivacaine was injected into each level. This was followed by thermal lesioning at 60 degrees celsius for 150 seconds.     Conscious sedation provided by M.D   The patient was monitored with continuous pulse oximetry, EKG, and intermittent blood pressure monitors. The patient was hemodynamically stable throughout the entire process was responsive to voice, and breathing spontaneously. Supplemental O2 was provided at 2L/min via nasal cannula. Patient was comfortable for the duration of the  procedure.     There was a total of 2mg IV Midazolam and 100 mcg Fentanyl titrated for the procedure    The patient tolerated the procedure well. Was able to move their leg at the knee and ankle at the conclusion of the procedure    The patient was monitored after the procedure. Patient was given post procedure and discharge instructions to follow at home. We will see the patient back in two weeks or the patient may call to inform of status. The patient was discharged in a stable condition

## 2017-05-09 NOTE — IP AVS SNAPSHOT
[facility] ([facility])  [address] [place], [place] [id number]  Phone: [phone]           Patient Discharge Instructions   Our goal is to set you up for success. This packet includes information on your condition, medications, and your home care.  It will help you care for yourself to prevent having to return to the hospital.     Please ask your nurse if you have any questions.      There are many details to remember when preparing to leave the hospital. Here is what you will need to do:    1. Take your medicine. If you are prescribed medications, review your Medication List on the following pages. You may have new medications to  at the pharmacy and others that you'll need to stop taking. Review the instructions for how and when to take your medications. Talk with your doctor or nurses if you are unsure of what to do.     2. Go to your follow-up appointments. Specific follow-up information is listed in the following pages. Your may be contacted by a nurse or clinical provider about future appointments. Be sure we have all of the phone numbers to reach you. Please contact your provider's office if you are unable to make an appointment.     3. Watch for warning signs. Your doctor or nurse will give you detailed warning signs to watch for and when to call for assistance. These instructions may also include educational information about your condition. If you experience any of warning signs to your health, call your doctor.           [facility]  Unless otherwise directed by your provider, please   contact [facility], our nurse care line   that is available for 24/7 assistance.     [phone] (toll-free)     Registered nurses in the [facility]   provide: appointment scheduling, clinical advisement, health education, and other advisory services.                  ** Verify the list of medication(s) below is accurate and up to date. Carry this with you in case of  emergency. If your medications have changed, please notify your healthcare provider.             Medication List      CONTINUE taking these medications        Additional Info                      albuterol 90 mcg/actuation inhaler   Quantity:  1 Inhaler   Refills:  6    Instructions:  Take 2 puffs every 4-6 hours  as needed for shortness of breath/wheezing     Begin Date    AM    Noon    PM    Bedtime       aspirin 81 MG EC tablet   Commonly known as:  ECOTRIN   Refills:  0   Dose:  1 tablet    Instructions:  Take 1 tablet by mouth every morning. prevents heart attacks and strokes     Begin Date    AM    Noon    PM    Bedtime       atorvastatin 20 MG tablet   Commonly known as:  LIPITOR   Quantity:  30 tablet   Refills:  6   Dose:  20 mg    Instructions:  Take 1 tablet (20 mg total) by mouth once daily.     Begin Date    AM    Noon    PM    Bedtime       blood sugar diagnostic Strp   Quantity:  100 each   Refills:  6    Instructions:  Freestyle light strips and lancets     Begin Date    AM    Noon    PM    Bedtime       celecoxib 200 MG capsule   Commonly known as:  CeleBREX   Quantity:  30 capsule   Refills:  2    Instructions:  ONE CAPSULE DAILY     Begin Date    AM    Noon    PM    Bedtime       econazole nitrate 1 % cream   Quantity:  30 g   Refills:  2    Instructions:  Apply topically 2 (two) times daily.     Begin Date    AM    Noon    PM    Bedtime       fluconazole 150 MG Tab   Commonly known as:  DIFLUCAN   Quantity:  3 tablet   Refills:  3    Instructions:  One daily for 3 days     Begin Date    AM    Noon    PM    Bedtime       insulin detemir 100 unit/mL (3 mL) Inpn pen   Commonly known as:  LEVEMIR FLEXPEN   Quantity:  1 Box   Refills:  11   Dose:  20 Units   Indications:  type 2 diabetes mellitus    Instructions:  Inject 20 Units into the skin every evening.     Begin Date    AM    Noon    PM    Bedtime       * insulin lispro 100 unit/mL injection   Commonly known as:  HUMALOG   Quantity:  3 mL    Refills:  11   Dose:  11 Units    Instructions:  Inject 11 Units into the skin 3 (three) times daily before meals. Glucose      Intervention  (units to add in addition to meal time Humalog base dose of 11 Units)                    0-80 orange juice      0 units 151 - 200     +2 Units 201 - 250     +4 Units 251 - 300     +6 Units 301 - 350      +8 Units 351 - 400     +10 Units > 400        Call MD     Begin Date    AM    Noon    PM    Bedtime       * insulin lispro 100 unit/mL injection   Commonly known as:  HUMALOG   Quantity:  5 vial   Refills:  6    Instructions:  11 Units before meals plus sliding scale     Begin Date    AM    Noon    PM    Bedtime       lisinopril 2.5 MG tablet   Commonly known as:  PRINIVIL,ZESTRIL   Quantity:  30 tablet   Refills:  6    Instructions:  One daily for proteinuria.     Begin Date    AM    Noon    PM    Bedtime       metformin 500 MG 24 hr tablet   Commonly known as:  GLUCOPHAGE-XR   Quantity:  360 tablet   Refills:  6   Dose:  1000 mg    Instructions:  Take 2 tablets (1,000 mg total) by mouth 2 (two) times daily.     Begin Date    AM    Noon    PM    Bedtime       omeprazole 20 MG capsule   Commonly known as:  PRILOSEC   Quantity:  30 capsule   Refills:  6   Dose:  20 mg    Instructions:  Take 1 capsule (20 mg total) by mouth every morning.     Begin Date    AM    Noon    PM    Bedtime       oxycodone-acetaminophen  mg per tablet   Commonly known as:  PERCOCET   Quantity:  120 tablet   Refills:  0   Dose:  1 tablet    Start Date:  5/10/2017   Instructions:  Take 1 tablet by mouth every 6 (six) hours as needed for Pain.     Begin Date    AM    Noon    PM    Bedtime       vitamin D 1000 units Tab   Refills:  0   Dose:  5000 mg    Instructions:  Take 5,000 mg by mouth once daily.     Begin Date    AM    Noon    PM    Bedtime       * Notice:  This list has 2 medication(s) that are the same as other medications prescribed for you. Read the directions carefully, and ask  your doctor or other care provider to review them with you.         Where to Get Your Medications      These medications were sent to [facility] - [place], [place] - [address]  [address], [place] [address]     Phone:  [phone]     oxycodone-acetaminophen  mg per tablet                  Please bring to all follow up appointments:    1. A copy of your discharge instructions.  2. All medicines you are currently taking in their original bottles.  3. Identification and insurance card.    Please arrive 15 minutes ahead of scheduled appointment time.    Please call 24 hours in advance if you must reschedule your appointment and/or time.        Your Scheduled Appointments     May 11, 2017  8:00 AM CDT   Established Occupational Therapy with Meg Bishop OT   [facility] ([facility])    [address] [facility] [address] [facility] [address] [phone]            May 16, 2017 10:00 AM CDT   Established Occupational Therapy with Meg Bishop OT   [facility] ([facility])    [address] [place] [address] [phone]            May 23, 2017 10:00 AM CDT   Established Occupational Therapy with Meg Bishop OT   [facility] ([facility])    [address] [place] [address] [phone]            May 25, 2017  8:00 AM CDT   Established Patient Visit with Lina Wagner MD   [facility] ([facility])    [address] [place] [address] [phone]            May 30, 2017  8:00 AM CDT   Established Patient Visit with Velma Garcia MD   [facility] ([facility])    [facility] [facility], [address]  [facility] [address] [phone]                  Discharge Instructions       Home Care Instructions Pain Management:    1. DIET:   You may resume your normal diet today.   2. BATHING:   You may shower with  "luke warm water. No tub baths or anything that will soak injection sites under water for 24 hours.  3. DRESSING:   You may remove your bandage today.   4. ACTIVITY LEVEL:   You may resume your normal activities 24 hrs after your procedure. Nothing strenuous today.  5. MEDICATIONS:   You may resume your normal medications today. To restart blood thinners, ask your doctor.  6. SPECIAL INSTRUCTIONS:   No heat to the injection site for 24 hrs including, bath or shower, heating pad, moist heat, or hot tubs.    Use ice pack to injection site for any pain or discomfort.  Apply ice packs for 20 minute intervals as needed.     If you have received any sedatives by mouth today you may not drive for 12 hours.    If you have received any sedation through your IV, you may not drive for 24 hrs.     PLEASE CALL YOUR DOCTOR IF:  1. Redness or swelling around the injection site.  2. Fever of 101 degrees or more  3. Drainage (pus) from the injection site.  4. For any continuous bleeding (some dried blood over the incision is normal.)    FOR EMERGENCIES:   If any unusual problems or difficulties occur during clinic hours, call [phone] or [phone].       Admission Information     Date & Time Provider Department CSN    5/9/2017  8:03 AM Lina Wagner MD [facility] [id number]      Care Providers     Provider Role Specialty Primary office phone    Lina Wagner MD Attending Provider Pain Medicine [phone]    Lina Wagner MD Surgeon  Pain Medicine [phone]      Your Vitals Were     BP Pulse Temp Resp Height Weight    131/79 82 98.3 °F (36.8 °C) (Oral) 18 5' 5" (1.651 m) 155.6 kg (343 lb)    Last Period SpO2 BMI          03/13/2017 99% 57.08 kg/m2        Recent Lab Values        10/17/2014 10/28/2014 4/1/2015 7/6/2015 10/6/2015 1/25/2016 6/17/2016 12/15/2016     10:00 AM  9:15 AM  9:17 AM  9:28 AM  9:53 AM 10:40 AM 10:15 AM  9:25 AM    A1C 6.8 (H) 6.9 (H) 8.8 (H) 7.6 (H) 7.8 (H) 9.6 (H) 9.0 (H) " 10.6 (H)    Comment for A1C at  9:25 AM on 12/15/2016:  According to ADA guidelines, hemoglobin A1C <7.0% represents  optimal control in non-pregnant diabetic patients.  Different  metrics may apply to specific populations.   Standards of Medical Care in Diabetes - 2016.  For the purpose of screening for the presence of diabetes:  <5.7%     Consistent with the absence of diabetes  5.7-6.4%  Consistent with increasing risk for diabetes   (prediabetes)  >or=6.5%  Consistent with diabetes  Currently no consensus exists for use of hemoglobin A1C  for diagnosis of diabetes for children.        Allergies as of 5/9/2017     No Known Allergies      Advance Directives     An advance directive is a document which, in the event you are no longer able to make decisions for yourself, tells your healthcare team what kind of treatment you do or do not want to receive, or who you would like to make those decisions for you.  If you do not currently have an advance directive, [facility] encourages you to create one.  For more information call:  [phone]), [phone] ([phone]),  or log on to [email or web address]¢ Network.        Language Assistance Services     ATTENTION: Language assistance services are available, free of charge. Please call [phone].      ATENCIÓN: Si habla español, tiene a abad disposición servicios gratuitos de asistencia lingüística. Llame al [phone].     CHÚ Ý: N?u b?n nói Ti?ng Vi?t, có các d?ch v? h? tr? ngôn ng? mi?n phí dành cho b?n. G?i s? [phone].        Diabetes Discharge Instructions                                   [facility] Sign-Up     Activating your [facility] account is as easy as 1-2-3!     1) Visit [email or web address], select Sign Up Now, enter this activation code and your date of birth, then select Next.  [email or web address]  Expires: 5/26/2017  9:15 AM      2) Create a username and password to use when you visit [facility] in the future and select a security question  in case you lose your password and select Next.    3) Enter your e-mail address and click Sign Up!    Additional Information  If you have questions, please e-mail [email or web address] or call [phone] to talk to our [facility] staff. Remember, [facility] is NOT to be used for urgent needs. For medical emergencies, dial 911.          [facility] complies with applicable Federal civil rights laws and does not discriminate on the basis of race, color, national origin, age, disability, or sex.

## 2017-05-09 NOTE — H&P (VIEW-ONLY)
Subjective:      Patient ID: Alivia Thomas is a 51 y.o. female.    Chief Complaint: Knee Pain (1 month follow up)    Referred by: No ref. provider found     Interval History 4/11/2017:  The patient returns today for follow up and medication refill.  She has increased her dieting and exercise for weight loss.  She has lost 14 lbs since her last visit.  She is very excited about this.  She is walking 6 days per week.  She also stays active taking her of her elderly father.  She has given up meat for lent.  She continues to follow up with bariatrics.  Her biggest complaint today is lower back pain.  She previously had benefit with cooled lumbar RFAs and would like to schedule repeats.  Her pain is worse with prolonged standing and bending.  She is taking Percocet as needed for pain without adverse effects.  Her pain today is 6/10.  The patient denies any bowel or bladder incontinence or signs of saddle paresthesia.  The patient denies any major medical changes since last office visit.    Interval History 3/14/2017:  The patient returns today for follow up of back and knee pain.  She continues with measures for weight loss.  She is still planning on bariatric surgery but would like to lose weight on her own prior to this.  She has lost about 4 lbs since her visit with me last month.  She continues to take Percocet which helps her pain without adverse effects.  Her pain today is 8/10.  The patient denies any bowel or bladder incontinence or signs of saddle paresthesia.  The patient denies any major medical changes since last office visit.    Interval History 2/15/2017:  The patient returns today for follow up and medication refill.  She is still planning on having weight loss surgery in the future.  She admits that she has not been as active as previously.  She has a follow up with Dr. Swan scheduled next month.  She continues to take Percocet with benefit.  Her pain today is 8/10.      Interval History  1/18/2017:  The patient returns for follow up and medication refill.  She reports no major changes in her back and knee pain since her last visit.  She has had a lot going on with health issues of family members.  She takes care of her father and her brother, who were both recently hospitalized.  She still plans on having weight loss surgery in the future.  Her pain today is.  She is taking Percocet with benefit and without side effects at this time.    Interval History 12/15/2016:  The patient returns today for follow up of lower back and bilateral knee pain.  She continues to report relief from cooled lumbar RFAs in October.  She feels as though the colder weather is causing increased knee pain.  Since her last visit, she has decided to have weight loss surgery.  She was evaluated by bariatrics and is undergoing pre-op workup at this time.  Her pain today is 8/10.  She continue to take Percocet with significant benefit.      Interval History 11/3/2016:  The patient returns today for follow up of back pain.  She is s/p left then right L2,3,4,5 cooled RFA completed on 10/19/16 with 80% pain relief.  She is very satisfied with these results.  She continues to take Percocet with relief.  She has started kick boxing classes and continues to lose weight.  She has noticeable weight loss since her last visit and is very happy about this.  Her pain today is 8/10.    Interval History 9/16/2016:  The patient returns today with complaints of lower back and knee pain.  Her worst pain is in her lower back without radiation.  She has lost weight since her last weight.  She has increased her exercise which is helping.  She continues with her diet plan.  She is having the pool at her home fixed and plans to use this for exercise, as she has benefited from frequent pool therapy at [facility].  She previously had significant relief with lumbar RFAs in April for about 4 months.  She would like to repeat the procedures.  She  continues to take Percocet as needed which provides her relief.  Her pain today is 8/10.  The patient denies any bowel or bladder incontinence or signs of saddle paresthesia.      Interval History 8/19/2016:  The patient returns today for follow up and medication refill.  She complains of back and knee pain.  Her back pain does not radiate.  She did have relief with RFA in April but feels the pain is returning.  Her previous UTI has resolved.  She has been unable to return to her aquatic therapy because she is caring for her father.  She plans to start walking in the morning before her father wakes up.  She has gained a few pounds since her last visit and is upset about this, as she was previously losing at each visit.  She is also trying to control her diet.  She continues to take Percocet with significant pain relief.  Her pain today is 8/10.  The patient denies any bowel or bladder incontinence or signs of saddle paresthesia.  The patient denies any major medical changes since last office visit.    Interval History 7/19/2016:  The patient returns today for follow up.  She has a history of lower back and bilateral knee pain.  Since her last visit, she reports being diagnosed with a UTI and is currently on antibiotics. She has still been unable to return to aquatherapy.  She has been performing a home exercise routine.  She has lost 6 lbs since her visit last month.  She is taking Percocet as needed for pain.  She reports efficacy without adverse effects.  Her pain today is 7/10.      Interval History 6/20/2016:  Patient returns for follow up and medication refill.  She has a history of lower back and bilateral knee pain.  Since her last encounter, she reports that she has been suffering with an upper respiratory infection.  She saw Dr. Richardson last week and was started on oral Augmentin and antibiotic eye drops.  She reports that whenever she is sick, she suffers with swelling and drainage to her left eye.  She  states that her symptoms have improved since starting the eye drops.  She has been unable to participate in her daily pool therapy since she has been sick but is anxious to resume once she is feeling better.  She did have recent labwork which showed an improving A1C with cholesterol and triglycerides WNL.  She is proud of herself about this, as she reports be very good with her diet recently.  Her pain today is an 8/10.    Interval History 5/5/2016:  Patient returns today for procedure follow up.  She is s/p left then right L2,3,4,5 RFA completed on 4/20/16 with 70% pain relief so far.  She reports lower back and bilateral knee pain.  She has had genicular nerve blocks in the past which provided significant relief for her left knee pain and limited relief of her right knee pain.  She is currently doing physical therapy per self.  She is doing 45 minutes of pool therapy five days per week.  She thinks that this is helping with her pain and mobility.  She is trying to lose weight because she is aware that this will help with her pain.  She is taking percocet as needed which provided relief without side effects.  Her pain today is a 5/10.      Interval History: 3/28/2016:  Patient returns today for follow up of lower back and bilateral knee pain.  She is s/p Bilateral L2,3,4,5 MBB on 2/16/16 with 80% relief for 5 days and bilateral L2,3,4,5 MBB on 3/1/16 with 70% pain relief for 1 day.  She is requesting to schedule the RFAs.  The worst of her pain is located to her left lower back and does not radiate. She is still complaining of bilateral knee pain which is worse with walking and activity.  Her pain today is a 9/10.  The patient denies any bowel/bladder incontinence or symptoms of saddle paresthesia.  The patient denies any major medical changes since last OV. She is currently taking Percocet which helps her pain without any adverse effects.     Interval History: 12/17/2015:  Patient presents in clinic for follow up  for lower back, bilateral knee, and right arm pain. Her pain is 9/10 today. She underwent carpal tunnel revision 12/14/15 in the right wrist. She is currently out of her Percocet 10-325mg prescription.   Cont to have significant low back pain, wh will also ich she reports is her worst current pain.  Based on previous imaging we know that the patient has significant facet arthropathy in the lumbar spine at the levels of L3-4 and L4-5 L5-S1.  She describes dull achy with occasional sharp pain rates as 7/10.     Interval history 10/26/2015:  Patient returns to clinic for follow up previously seen for knee pain and low back pain. She reports pain is improved with Percocet 10/325 BID. She is no longer taking Lyrica and recently started Topamax. She continues to take Celebrex once per day and does help. She also continues to use a topical cream PRN and does help some. She has completed therapy since last visit but she is continuing to exercise regularly. Her pain in back and knees is unchanged in quality and distribution from previous visits. She otherwise denies any new issues at this time.     Interval history 9/21/2015:  Since previous encounter the patient comes in after having started using gabapentin and developing swelling in her legs.  She states that it did make a difference for her pain although she discontinued it secondary to swelling after a decrease in her dosing did not alleviate this.  She followed up with her orthopedist and did have x-rays performed which did not show any significant change compared to previous.  She is scheduled for a four-month follow-up.  She has not yet begun exercising but will begin soon she is scheduled for pool-based therapy.  The opioid medications have been helping her and her pain twice a day.  Further decrease to once a day prevented her from being able to function.  She does continue to take Celebrex without adverse reaction.  Additionally the patient stated that she has  been having lower back pain which is new.    Interval History 08-: Since previous visit patient comes in today to discuss her medications.  Patient states she is having pain in the lower back and both knee, sharp , throbbing , burning, and stabbing pain, she rates it 8/10.  Patient is taking percocet 10/325mg, we have been weaning her from this medication last prescription was provided for 30 tablets.  Additionally the patient is taking Celebrex with regularity with some improvement in her pain.  She has completed physical therapy status post knee replacement her knee hardware appears appropriate she has a scheduled appointment to follow-up with her orthopedic surgeon in one week to discuss using a extension brace while she is at home laying in bed.  She continues to swim twice a week and is actively trying to lose weight and has been dieting.    Interval History 06/19/2015:  Patient presents in clinic for two month follow up. She reports her bilateral knee pain and low back pain is an 8/10. She currently takes celebrex and Percocet for pain and uses a topical cream.  She was recently evaluated by her orthopedist and the hardware all appears to be appropriately placed and the next follow-up is in 6 weeks.  The patient continues to work on weight loss and exercise and states that she has been doing more than in the past.   Patient reports no other health changes since previous encounter.    Interval History 04/09/2015:  Patient presents in clinic for one month follow up. She reports bilateral knee pain and low back pain is a 9/10 today. She currently takes percocet for pain as needed and uses a cane for ambulation. She states that the low back pain is new.  She continues to have bilateral knee pain and is in physical therapy.  She continues to take Celebrex daily and uses a topical pain cream regularly.    Patient reports no other health changes since previous encounter.    Interval history 3/5/2015:  Since  previous encounter patient is status post right total knee replacement on 11/4/2014 and has been healed with postoperative visits showing good progress.  The patient does have continued physical therapy sessions and has been attempting to lose weight and has lost approximately 25 pounds although her BMI continues to be 54.  She has been making good efforts to try and continue to increase her range of motion and lose weight.  She continues to have significant pain in bilateral knees and continues to use a cane for ambulation.  She was receiving oxycodone/acetaminophen 10/325 every 8 hours by mouth when necessary and requiring in order to persist in her physical therapy although the topical pain cream that she has been applying has been helping her to a limited degree she still requires the medication regularly.  Her recent x-ray imaging shows good positioning of the prosthesis.  No other health changes since previous encounter.     Interval history 2/17/2014:  Patient reports that she has been using the topical compounded cream on the knee approximately 4 times per day and she states that it does help her with her pain that she is experiencing.  She states that she has not gone to formal physical therapy but that she has been going to a pool that has exercise classes which is free for her and that she feels like it is helping her continue to lose weight.  Additionally she continues using hydrocodone/acetaminophen 7.5/750 approximately 3 times per day when necessary and states that also helps with her pain symptoms.  He she's still talks to have replacement for the left knee, but would like to lose weight further before going to that.  She has also had previous injections into her knees which have offered little to no relief in her pain symptoms.  She has had no other health changes since previous encounter.    Previous encounter 1/21/2014:  HPI Comments: 49 yo female presents for initial evaluation of bilateral knee  pain, L>R. She is s/p arthroscopic right knee surgery and eventual replacement and then revision surgeries (Dr. Swan, Orthopedics). The pain is present in both knees and is described as a terrible ache. She hears occasional popping in both knees with movement. The pain is worse with being on her feet and getting up from a sitting to standing position. Denies lower ext weakness or paresthesias. She does not have back pain or pain radiating down her legs. The pain is better with Celebrex and rest. She also takes Vicodin ES TID but this makes her very sleepy. She is not sure it helps with the pain because she usually falls asleep.     Physical Therapy: not since 2011 just prior to her 3rd right knee surgery (revision after replacement); has tried swimming which helps with weight loss    Non-pharmacologic Treatment: none    Pain Medications: Percocet and celebrex    Blood thinners: ASA 81 mg daily     Interventional Therapies: steroid inj and visco-supplementation (series of 3) in left knee- not helpful  3/31/14 Bilateral genicular nerve block- significant relief of left knee, limited relief of right knee pain  2/16/16 Bilateral L2,3,4,5 MBB  3/1/16 Bilateral L2,3,4,5 MBB   4/6/16 Left L2,3,4,5 RFA- significant relief  4/20/16 Right L2,3,4,5 RFA- significant relief  10/5/16 Left L2,3,4,5 cooled RFA  10/19/16 Right L2,3,4,5 cooled RFA      Relevant Surgeries: right knee surgery x3 (arthroscopic, then replacement and subsequent revision surgery), s/p left total knee arthroplasty    Relevant Imaging:  Xray Lumbar spine 09/21/2015  Lumbar spine radiograph    Comparison: None    Results: AP, lateral neutral, lateral flexion , lateral extension, bilateral oblique and spot views. The alignment of the lumbar spine demonstrates a mild levoscoliosis . 11-mm anterior listhesis of L4 relative to L5 with no translational abnormalities  seen on flexion and extension views. The vertebral body heights are well-maintained , mild  disk space narrowing L4-L5 and L5-S1. Mild anterior and marginal osteophyte formation seen throughout the lumbar spine . The oblique views demonstrate no   definite spondylolysis. There is facet joint osseous hypertrophy noted at L3-L4 and L4-L5.      Impression         The Significant spondylosis of the lumbar spine with grade 1 anterior listhesis L4 relative to L5.  Facet joint osseous hypertrophy L3-L4 and L4-5.      Electronically signed by: BLESSING ROE MD  Date: 09/21/15  Time: 09:56           knee x-rays (see below) x-ray knee bilateral 8/26/2015:  Standing AP knees, lateral view of both knees and sunrise view of both patella. Study compared to May 2015. Postop change of bilateral knee replacement. The prosthetic components are in satisfactory position. Erosive changes involving the patella   again evident bilaterally.    Impression no significant change.      Xray Bilateral Knee 05/25/2015:  There are bilateral total knee arthroplasties with posterior resurfacing of the patella. As observed on 12/17/2014 there is a fracture of the left patella in the parasagittal plane.    Heterotopic bone is evident about each knee.    I detect no dislocation, unusual radiopaque retained foreign body, lytic or blastic lesion, or chondrocalcinosis.    Lab Results   Component Value Date    HGBA1C 10.6 (H) 12/15/2016     Lab Results   Component Value Date    CHOL 200 (H) 12/15/2016    CHOL 153 06/17/2016    CHOL 157 04/01/2015     Lab Results   Component Value Date    HDL 40 12/15/2016    HDL 44 06/17/2016    HDL 44 04/01/2015     Lab Results   Component Value Date    LDLCALC 141.0 12/15/2016    LDLCALC 92.6 06/17/2016    LDLCALC 97.2 04/01/2015     Lab Results   Component Value Date    TRIG 95 12/15/2016    TRIG 82 06/17/2016    TRIG 79 04/01/2015     Lab Results   Component Value Date    CHOLHDL 20.0 12/15/2016    CHOLHDL 28.8 06/17/2016    CHOLHDL 28.0 04/01/2015         Past Medical History:   Diagnosis Date     Asthma     Diabetes mellitus type II     DJD (degenerative joint disease) of knee 6/19/2014    Hyperlipidemia     Morbid obesity     Sleep apnea      Past Surgical History:   Procedure Laterality Date    CARPAL TUNNEL RELEASE      CARPAL TUNNEL RELEASE  1980s    left    CARPAL TUNNEL RELEASE  2012    right    JOINT REPLACEMENT Bilateral     with 2 revisions on rt    KNEE SURGERY  3/2010    orthroscope    KNEE SURGERY  6-19-14    left TKR     Family History   Problem Relation Age of Onset    Diabetes Mother     Hypertension Mother     Cataracts Mother     Diabetes Father     Cataracts Father     Coronary artery disease Brother     Amblyopia Neg Hx     Blindness Neg Hx     Cancer Neg Hx     Glaucoma Neg Hx     Macular degeneration Neg Hx     Retinal detachment Neg Hx     Strabismus Neg Hx     Stroke Neg Hx     Thyroid disease Neg Hx      Social History     Social History    Marital status: Single     Spouse name: N/A    Number of children: N/A    Years of education: N/A     Occupational History    Not on file.     Social History Main Topics    Smoking status: Never Smoker    Smokeless tobacco: Never Used    Alcohol use Yes      Comment: occasionally     Drug use: No    Sexual activity: Yes     Partners: Female     Birth control/ protection: None     Other Topics Concern    Not on file     Social History Narrative    Disabled. The patient is the youngest of 6 siblings. Single. Lives with single-sex partner.                      Review of patient's allergies indicates:  No Known Allergies    Current Outpatient Prescriptions:     albuterol 90 mcg/actuation inhaler, Take 2 puffs every 4-6 hours  as needed for shortness of breath/wheezing, Disp: 1 Inhaler, Rfl: 6    aspirin (ECOTRIN) 81 MG EC tablet, Take 1 tablet by mouth every morning. prevents heart attacks and strokes, Disp: , Rfl:     atorvastatin (LIPITOR) 20 MG tablet, Take 1 tablet (20 mg total) by mouth once daily., Disp: 30  tablet, Rfl: 6    blood sugar diagnostic Strp, Freestyle light strips and lancets, Disp: 100 each, Rfl: 6    celecoxib (CELEBREX) 200 MG capsule, One capsule daily, Disp: 30 capsule, Rfl: 2    econazole nitrate 1 % cream, Apply topically 2 (two) times daily., Disp: 30 g, Rfl: 2    ergocalciferol (ERGOCALCIFEROL) 50,000 unit Cap, Take 1 capsule (50,000 Units total) by mouth twice a week., Disp: 24 capsule, Rfl: 0    fluconazole (DIFLUCAN) 150 MG Tab, One daily for 3 days, Disp: 3 tablet, Rfl: 3    insulin detemir (LEVEMIR FLEXPEN) 100 unit/mL (3 mL) SubQ InPn pen, Inject 20 Units into the skin every evening., Disp: 1 Box, Rfl: 11    insulin lispro (HUMALOG) 100 unit/mL injection, Inject 11 Units into the skin 3 (three) times daily before meals. Glucose        Intervention  (units to add in addition to meal time Humalog base dose of 11 Units)                       0-80    orange juice            0 units    151 - 200       +2 Units    201 - 250       +4 Units    251 - 300       +6 Units    301 - 350        +8 Units    351 - 400       +10 Units    > 400           Call MD, Disp: 3 mL, Rfl: 11    lisinopril (PRINIVIL,ZESTRIL) 2.5 MG tablet, One daily for proteinuria., Disp: 30 tablet, Rfl: 6    metformin (GLUCOPHAGE-XR) 500 MG 24 hr tablet, Take 2 tablets (1,000 mg total) by mouth 2 (two) times daily., Disp: 360 tablet, Rfl: 6    omeprazole (PRILOSEC) 20 MG capsule, Take 1 capsule (20 mg total) by mouth every morning., Disp: 30 capsule, Rfl: 6    oxycodone-acetaminophen (PERCOCET)  mg per tablet, Take 1 tablet by mouth every 6 (six) hours as needed for Pain., Disp: 120 tablet, Rfl: 0    vitamin D 185 MG Tab, Take 5,000 mg by mouth once daily., Disp: , Rfl:     REVIEW OF SYSTEMS:    GENERAL:  Patient is actively losing weight.  RESPIRATORY:  Negative for cough, wheezing or shortness of breath, patient denies any recent URI.  CARDIOVASCULAR:  Negative for chest pain, leg swelling or  "palpitations.  GI:  Negative for abdominal discomfort, blood in stools or black stools or change in bowel habits, occasional constipation.  MUSCULOSKELETAL:  See HPI.  SKIN:  Negative for lesions, rash, and itching.  PSYCH:  No mood disorder or recent psychosocial stressors.  Patient's sleep is disturbed secondary to pain (patient reports also that she has insomnia).  HEMATOLOGY/LYMPHOLOGY:  Negative for prolonged bleeding, bruising easily or swollen nodes.  81 mg aspirin  ENDO: Patient has a history of diabetes  NEURO:   No history of headaches, syncope, paralysis, seizures or tremors.  All other reviewed and negative other than HPI.    OBJECTIVE:    /83  Pulse 87  Resp 18  Ht 5' 5" (1.651 m)  Wt (!) 157.2 kg (346 lb 9 oz)  BMI 57.67 kg/m2    PHYSICAL EXAMINATION:    GENERAL: Well appearing, in no acute distress, alert and oriented x3.   PSYCH:  Mood and affect appropriate.  SKIN: Skin color, texture, turgor normal, no rashes or lesions.  HEAD/FACE:  Normocephalic, atraumatic.   CV: RRR with palpation of the radial artery.  PULM: No evidence of respiratory difficulty, symmetric chest rise.  BACK: No pain to palpation over the lumbar facet joints.  Limited lumbar extension with pain.  Positive bilateral facet loading. Negative SLR bilaterally.  Pain with palpation to bilateral SI joints.  JENNA is negative bilaterally.  EXTREMITIES:  Well-healed midline scars to bilateral knees.  Painful extension and flexion of bilateral knees.  Medial joint line tenderness to bilateral knees.    MUSCULOSKELETAL: Bilateral upper and lower extremity strength is normal and symmetric.  No atrophy or tone abnormalities are noted.  NEURO: Bilateral lower extremity coordination and muscle stretch reflexes are physiologic and symmetric.  Plantar response are downgoing. No clonus.  No loss of sensation is noted.  GAIT: Antalgic- ambulating without assistance.       Assessment:       Encounter Diagnoses   Name Primary?    Facet " arthritis of lumbar region Yes    Chronic pain disorder     Chronic pain of both knees     Status post total right knee replacement     Encounter for long-term opiate analgesic use     Failed total knee arthroplasty, sequela          Plan:       - Previous imaging was reviewed and discussed with the patient today.     - Continue to f/u with bariatrics for possible gastric sleeve surgery.      - The patient will continue a home exercise routine to help with pain and strengthening.  She is walking and/or riding her bike daily.  We had a long discussion regarding the importance of maintaining proper weight in pain control.    - Will schedule left then right L2,3,4,5 cooled RFA, 2 weeks apart.  The procedure, risks, benefits and options were discussed with patient. There are no contraindications to the procedure. The patient expressed understanding and agreed to proceed.  Consent obtained today.    - Continue oxycodone-acetaminophen 10/325 one tablet every 6 hours PRN pain, #120, 0 refills.    - The [place] Board of Pharmacy website for prescription monitoring was consulted today, and it does not suggest any deviations in conflict with the patient's controlled substance contract with our clinic. Will continue current therapy with frequent monitoring of the controlled substance database, and urine drug screens on followup.     - UDS from 1/18/17 was reviewed and is compliant.  No UDS ordered today.    - Continue topical pain cream PRN.    - RTC in 1 month or sooner if needed.    - Dr. Wagner was consulted on the patient and agrees with this plan.      The above plan and management options were discussed at length with patient. Patient is in agreement with the above and verbalized understanding.     Virginia Sanchez, EMILY  04/11/2017

## 2017-05-09 NOTE — DISCHARGE SUMMARY
Discharge Note  Short Stay      SUMMARY     Admit Date: 5/9/2017    Attending Physician: Lina Wagner      Discharge Physician: Lina Wagner      Discharge Date: 5/9/2017 9:25 AM     PROCEDURE: Right  Coolief thermal radiofrequency ablation of the the medial branch nerves at the   transverse process of L3, L4, L5 and sacral ala     2)Conscious sedation provided by MD     REASON FOR PROCEDURE: Facet arthropathy     Disposition: Home or self care    Patient Instructions:   Current Discharge Medication List      START taking these medications    Details   oxycodone-acetaminophen (PERCOCET)  mg per tablet Take 1 tablet by mouth every 6 (six) hours as needed for Pain.  Qty: 120 tablet, Refills: 0         CONTINUE these medications which have NOT CHANGED    Details   aspirin (ECOTRIN) 81 MG EC tablet Take 1 tablet by mouth every morning. prevents heart attacks and strokes      atorvastatin (LIPITOR) 20 MG tablet Take 1 tablet (20 mg total) by mouth once daily.  Qty: 30 tablet, Refills: 6    Associated Diagnoses: Hyperlipidemia, unspecified hyperlipidemia type      blood sugar diagnostic Strp Freestyle light strips and lancets  Qty: 100 each, Refills: 6    Associated Diagnoses: Type 2 diabetes mellitus, uncontrolled      celecoxib (CELEBREX) 200 MG capsule ONE CAPSULE DAILY  Qty: 30 capsule, Refills: 2    Associated Diagnoses: Knee pain, bilateral      insulin detemir (LEVEMIR FLEXPEN) 100 unit/mL (3 mL) SubQ InPn pen Inject 20 Units into the skin every evening.  Qty: 1 Box, Refills: 11    Associated Diagnoses: Uncontrolled type 2 diabetes mellitus without complication, with long-term current use of insulin      !! insulin lispro (HUMALOG) 100 unit/mL injection Inject 11 Units into the skin 3 (three) times daily before meals. Glucose        Intervention  (units to add in addition to meal time Humalog base dose of 11 Units)                        0-80    orange juice             0 units     151 - 200        +2 Units     201 - 250       +4 Units     251 - 300       +6 Units     301 - 350        +8 Units     351 - 400       +10 Units     > 400           Call MD  Qty: 3 mL, Refills: 11      !! insulin lispro (HUMALOG) 100 unit/mL injection 11 Units before meals plus sliding scale  Qty: 5 vial, Refills: 6      lisinopril (PRINIVIL,ZESTRIL) 2.5 MG tablet One daily for proteinuria.  Qty: 30 tablet, Refills: 6    Associated Diagnoses: Proteinuria, unspecified type      metformin (GLUCOPHAGE-XR) 500 MG 24 hr tablet Take 2 tablets (1,000 mg total) by mouth 2 (two) times daily.  Qty: 360 tablet, Refills: 6    Associated Diagnoses: Uncontrolled type 2 diabetes mellitus without complication, with long-term current use of insulin      omeprazole (PRILOSEC) 20 MG capsule Take 1 capsule (20 mg total) by mouth every morning.  Qty: 30 capsule, Refills: 6    Associated Diagnoses: Gastroesophageal reflux disease without esophagitis      vitamin D 185 MG Tab Take 5,000 mg by mouth once daily.      albuterol 90 mcg/actuation inhaler Take 2 puffs every 4-6 hours  as needed for shortness of breath/wheezing  Qty: 1 Inhaler, Refills: 6    Associated Diagnoses: Asthma, well controlled, mild intermittent      econazole nitrate 1 % cream Apply topically 2 (two) times daily.  Qty: 30 g, Refills: 2      fluconazole (DIFLUCAN) 150 MG Tab One daily for 3 days  Qty: 3 tablet, Refills: 3       !! - Potential duplicate medications found. Please discuss with provider.          Resume home diet and activity

## 2017-05-09 NOTE — TELEPHONE ENCOUNTER
Patient stated the Rx for insulin lispro (HUMALOG) 100 unit/mL injection went to the the wrong pharmacy and was supposed to be for the Flexpen and not the vial. Requesting to have the Humalog Flexpen sent to [facility] - [place], [place] - [address] along the the [facility].

## 2017-05-09 NOTE — INTERVAL H&P NOTE
The patient has been examined and the H&P has been reviewed:    I concur with the findings and no changes have occurred since H&P was written.    Anesthesia/Surgery risks, benefits and alternative options discussed and understood by patient/family.    S/p left RFA 4/26/2017, presenting for the right side today      There are no hospital problems to display for this patient.

## 2017-05-10 ENCOUNTER — TELEPHONE (OUTPATIENT)
Dept: BARIATRICS | Facility: CLINIC | Age: 52
End: 2017-05-10

## 2017-05-10 NOTE — TELEPHONE ENCOUNTER
Patient states that her father is in the hospital and she is not ready to schedule a follow-up diet appointment at this time. She states she will call to schedule because she is still interested in pursuing bariatric surgery.

## 2017-05-10 NOTE — TELEPHONE ENCOUNTER
----- Message from Alexander Worthy RD sent at 4/5/2017  3:27 PM CDT -----  Regarding: Apt  Call to see if patient needs to reschedule her appointment if she has not rescheduled already.

## 2017-05-11 RX ORDER — INSULIN LISPRO 100 [IU]/ML
INJECTION, SOLUTION INTRAVENOUS; SUBCUTANEOUS
Qty: 1 BOX | Refills: 6 | Status: SHIPPED | OUTPATIENT
Start: 2017-05-11 | End: 2018-04-06 | Stop reason: SDUPTHER

## 2017-05-15 NOTE — PROGRESS NOTES
Subjective:       Patient ID: Alivia Thomas is a 51 y.o. female.    Chief Complaint: Follow-up    HPIPt is feeling well - walking better and has lost some weight - she is exercising.  No CP or SOB.  Review of Systems   Respiratory: Negative for shortness of breath (PND or orthopnea).    Cardiovascular: Negative for chest pain (arm pain or jaw pain).   Gastrointestinal: Negative for abdominal pain, diarrhea, nausea and vomiting.   Genitourinary: Negative for dysuria.   Neurological: Negative for seizures, syncope and headaches.       Objective:      Physical Exam   Constitutional: She is oriented to person, place, and time. She appears well-developed and well-nourished. No distress.   HENT:   Head: Normocephalic.   Mouth/Throat: Oropharynx is clear and moist.   Neck: Neck supple. No JVD present. No thyromegaly present.   Cardiovascular: Normal rate, regular rhythm, normal heart sounds and intact distal pulses.  Exam reveals no gallop and no friction rub.    No murmur heard.  Pulmonary/Chest: Effort normal and breath sounds normal. She has no wheezes. She has no rales.   Abdominal: Soft. Bowel sounds are normal. She exhibits no distension and no mass. There is no tenderness. There is no rebound and no guarding.   Musculoskeletal: She exhibits no edema.   Lymphadenopathy:     She has no cervical adenopathy.   Neurological: She is alert and oriented to person, place, and time.   Skin: Skin is warm and dry.   Psychiatric: She has a normal mood and affect. Her behavior is normal. Judgment and thought content normal.       Assessment:       1. Anemia, unspecified type    2. Hyperlipidemia, unspecified hyperlipidemia type    3. Proteinuria, unspecified type    4. Uncontrolled type 2 diabetes mellitus without complication, with long-term current use of insulin    5. Gastroesophageal reflux disease without esophagitis    6. Vitamin D deficiency    7. STD (sexually transmitted disease)    8. Encounter for screening for HIV      9. Nonspecific elevation of levels of transaminase and lactic acid dehydrogenase (LDH)         Plan:   Anemia, unspecified type  -     Iron and TIBC; Future; Expected date: 5/5/17  -     Ferritin; Future; Expected date: 5/5/17    Hyperlipidemia, unspecified hyperlipidemia type  -     Lipid panel; Future; Expected date: 5/5/17  -     atorvastatin (LIPITOR) 20 MG tablet; Take 1 tablet (20 mg total) by mouth once daily.  Dispense: 30 tablet; Refill: 6    Proteinuria, unspecified type  -     Urinalysis  -     Microalbumin/creatinine urine ratio  -     lisinopril (PRINIVIL,ZESTRIL) 2.5 MG tablet; One daily for proteinuria.  Dispense: 30 tablet; Refill: 6    Uncontrolled type 2 diabetes mellitus without complication, with long-term current use of insulin  -     CBC auto differential; Future; Expected date: 5/5/17  -     Comprehensive metabolic panel; Future; Expected date: 5/5/17  -     TSH; Future; Expected date: 5/5/17  -     Hemoglobin A1c; Future; Expected date: 5/5/17  -     metformin (GLUCOPHAGE-XR) 500 MG 24 hr tablet; Take 2 tablets (1,000 mg total) by mouth 2 (two) times daily.  Dispense: 360 tablet; Refill: 6  -     Discontinue: insulin detemir (LEVEMIR FLEXPEN) 100 unit/mL (3 mL) SubQ InPn pen; Inject 20 Units into the skin every evening.  Dispense: 1 Box; Refill: 11  -     insulin detemir (LEVEMIR FLEXPEN) 100 unit/mL (3 mL) SubQ InPn pen; Inject 20 Units into the skin every evening.  Dispense: 1 Box; Refill: 11    Gastroesophageal reflux disease without esophagitis  -     omeprazole (PRILOSEC) 20 MG capsule; Take 1 capsule (20 mg total) by mouth every morning.  Dispense: 30 capsule; Refill: 6    Vitamin D deficiency  -     Vitamin D; Future; Expected date: 5/5/17    STD (sexually transmitted disease)  -     HIV-1 and HIV-2 antibodies; Future; Expected date: 5/5/17  -     RPR; Future; Expected date: 5/5/17  -     Hepatitis panel, acute; Future; Expected date: 5/5/17    Encounter for screening for HIV   -      HIV-1 and HIV-2 antibodies; Future; Expected date: 5/5/17    Nonspecific elevation of levels of transaminase and lactic acid dehydrogenase (LDH)   -     Hepatitis panel, acute; Future; Expected date: 5/5/17    Other orders  -     insulin lispro (HUMALOG) 100 unit/mL injection; Inject 11 Units into the skin 3 (three) times daily before meals. Glucose        Intervention  (units to add in addition to meal time Humalog base dose of 11 Units)                        0-80    orange juice             0 units     151 - 200       +2 Units     201 - 250       +4 Units     251 - 300       +6 Units     301 - 350        +8 Units     351 - 400       +10 Units     > 400           Call MD  Dispense: 3 mL; Refill: 11  -     insulin lispro (HUMALOG) 100 unit/mL injection; 11 Units before meals plus sliding scale  Dispense: 5 vial; Refill: 6

## 2017-05-16 ENCOUNTER — CLINICAL SUPPORT (OUTPATIENT)
Dept: REHABILITATION | Facility: HOSPITAL | Age: 52
End: 2017-05-16
Attending: ORTHOPAEDIC SURGERY
Payer: MEDICARE

## 2017-05-16 DIAGNOSIS — M79.602 LEFT ARM PAIN: ICD-10-CM

## 2017-05-16 DIAGNOSIS — M25.512 ACUTE PAIN OF LEFT SHOULDER: ICD-10-CM

## 2017-05-16 PROCEDURE — 97035 APP MDLTY 1+ULTRASOUND EA 15: CPT | Mod: PO

## 2017-05-16 PROCEDURE — 97110 THERAPEUTIC EXERCISES: CPT | Mod: PO

## 2017-05-16 NOTE — PROGRESS NOTES
"OT Daily Progress Note    Patient:  Alivia Marie Cyr  Clinic #:  [id number]   Date of Note: 05/16/2017   Referring Physician:  Velma Garcia, *  Diagnosis:  Shoulder Pain/Bicep Tendonitis/Shoulder Impingement  Encounter Diagnoses   Name Primary?    Left arm pain     Acute pain of left shoulder         Start Time: 9:20 am  End Time: 10:20 am  Total Time:  min  Group Time:     Visit 8 of 20 authorized  MEDICARE/DASH visit #8    Subjective:   "Im feeling good, but my father is working on my nerves and its really making everything rough"  Pain: 2 out of 10     Objective:   Patient seen by OT this session. Treatment  consist of the following:  Therapeutic exercises    Patient received MH x 10 min to L shoulder to increase blood flow, circulation and tissue elasticity prior to therex. Patient received ultrasound to anterior shoulder area to increase blood flow, circulation, tissue elasticity, pain management and for wound/scar management for 8 minutes @ 1.3 Mhz, Intensity 1.0 w/cm2 at 100* duty cycle. Pt participated in UBE task at level 1 for improved blood flow to shoulder, shoulder endurance, and shoulder ROM without impingement x 6 (3 forward/ 3 backward) minutes. Patient performed seated pulleys for flexion and abduction x 3 min each direction for stretching and to increase shoulder ROM and mobility. Patient instructed and performed upgraded green t-band for rowing (scapular retraction with AB/ER) and shoulder extension x 15 reps each for scapular strengthening with emphasis on avoiding excessive upper trap compensation also added 6 short with orange t band. Instructed on and performed eccentric bicep strengthening with 3# weights with 10 second hold x 15 reps for strengthening of muscles of longest length.  Reassessment on 4/28/17    Treatment: Ultrasound x 8 min, Therapeutic exercises x 35 min and Manual x 10 min     Assessment:  Pt will continue to benefit from skilled OT intervention.  She has met 5 out " of 6 goals at this time and is progressing well toward remaining goals she will be reassessed next week prior to returning to MD for follow up. Will continue to progress as tolerated. She continues to have cathy residual pain but overall pain with functional tasks has improved. She continues to have some decreased strength and relies on compensation from various shoulder musculature.  Patient continues to demonstrate limitation  with  ROM, Decreased functional use, Decreased strength, Continued pain and Continued inflammation all of which interfere with vocational and leisurely pursuits.       New/Revised Goals: Continue POC  1) Independent with HEP-----met  2) Pt will demonstrate at least 160 degrees of motion in left shoulder abduction plane of movement for improved participation in house keeping tasks including reaching into cabinets to get supplies. -----met  3) Pt will demonstrate negative provocative testing for HK, Obriens, and Empty Can.-----met  4) Independent and pain free with ADL's and IADL's-----mostly met  5) Patient to score at 70% or more on FOTO to demonstrate improved perception of functional LUE use.-----met  6) Patient will report at least 1-2 out of 10 on VAS for improved overall function and decreased pain.-----met      Plan:  Pt will be treated by occupational therapy 2 times a week for 6 weeks for Pt Education, HEP, therapeutic exercises, neuromuscular re-education, joint mobilizations, modalities as well as any other treatments deemed necessary based on the patient's needs or progress.

## 2017-05-23 ENCOUNTER — TELEPHONE (OUTPATIENT)
Dept: OBSTETRICS AND GYNECOLOGY | Facility: CLINIC | Age: 52
End: 2017-05-23

## 2017-05-23 NOTE — TELEPHONE ENCOUNTER
----- Message from Marva Velazquez sent at 5/23/2017  9:45 AM CDT -----  Contact: self  Pt needing a call back regarding her US results, she can be reached at [phone].

## 2017-05-23 NOTE — TELEPHONE ENCOUNTER
I  Spoke to the pt and she states that she has a cycle that has been going on for 8 days  And she is calling for her ultrasound results. I read the results to the pt and she states can she get a call back. I gave the pt the pre cautions of bleeding and informed her to come in to the ED if she has any problems.

## 2017-05-25 ENCOUNTER — OFFICE VISIT (OUTPATIENT)
Dept: PAIN MEDICINE | Facility: CLINIC | Age: 52
End: 2017-05-25
Attending: ANESTHESIOLOGY
Payer: MEDICARE

## 2017-05-25 ENCOUNTER — DOCUMENTATION ONLY (OUTPATIENT)
Dept: REHABILITATION | Facility: HOSPITAL | Age: 52
End: 2017-05-25

## 2017-05-25 ENCOUNTER — CLINICAL SUPPORT (OUTPATIENT)
Dept: REHABILITATION | Facility: HOSPITAL | Age: 52
End: 2017-05-25
Attending: ORTHOPAEDIC SURGERY
Payer: MEDICARE

## 2017-05-25 VITALS
HEIGHT: 65 IN | WEIGHT: 293 LBS | TEMPERATURE: 98 F | DIASTOLIC BLOOD PRESSURE: 77 MMHG | SYSTOLIC BLOOD PRESSURE: 119 MMHG | BODY MASS INDEX: 48.82 KG/M2 | HEART RATE: 77 BPM

## 2017-05-25 DIAGNOSIS — G89.29 CHRONIC PAIN OF BOTH KNEES: ICD-10-CM

## 2017-05-25 DIAGNOSIS — M47.816 FACET ARTHRITIS OF LUMBAR REGION: Primary | ICD-10-CM

## 2017-05-25 DIAGNOSIS — M25.512 ACUTE PAIN OF LEFT SHOULDER: ICD-10-CM

## 2017-05-25 DIAGNOSIS — M25.562 CHRONIC PAIN OF BOTH KNEES: ICD-10-CM

## 2017-05-25 DIAGNOSIS — M25.561 CHRONIC PAIN OF BOTH KNEES: ICD-10-CM

## 2017-05-25 DIAGNOSIS — M79.602 LEFT ARM PAIN: ICD-10-CM

## 2017-05-25 DIAGNOSIS — G89.4 CHRONIC PAIN DISORDER: ICD-10-CM

## 2017-05-25 PROCEDURE — G8985 CARRY GOAL STATUS: HCPCS | Mod: CI,PO

## 2017-05-25 PROCEDURE — 99213 OFFICE O/P EST LOW 20 MIN: CPT | Mod: S$PBB,,, | Performed by: ANESTHESIOLOGY

## 2017-05-25 PROCEDURE — 99213 OFFICE O/P EST LOW 20 MIN: CPT | Mod: PBBFAC | Performed by: ANESTHESIOLOGY

## 2017-05-25 PROCEDURE — 99999 PR PBB SHADOW E&M-EST. PATIENT-LVL III: CPT | Mod: PBBFAC,,, | Performed by: ANESTHESIOLOGY

## 2017-05-25 PROCEDURE — G8984 CARRY CURRENT STATUS: HCPCS | Mod: CI,PO

## 2017-05-25 PROCEDURE — G8986 CARRY D/C STATUS: HCPCS | Mod: CI,PO

## 2017-05-25 PROCEDURE — 97110 THERAPEUTIC EXERCISES: CPT | Mod: PO

## 2017-05-25 RX ORDER — OXYCODONE AND ACETAMINOPHEN 10; 325 MG/1; MG/1
1 TABLET ORAL EVERY 6 HOURS PRN
Qty: 120 TABLET | Refills: 0 | Status: SHIPPED | OUTPATIENT
Start: 2017-06-09 | End: 2017-07-07 | Stop reason: SDUPTHER

## 2017-05-25 NOTE — PROGRESS NOTES
Occupational Therapy Discharge Summary    Patient: Alivia Marie Cyr  MRN: [id number]    Patient is a referred to Occupational Therapy by Dr. Garcia with a diagnosis of Shoulder Pain/Bicep Tendonitis/Shoulder Impingement and a referral for Eval and Treat.  Patient received initial evaluation on  4/4/17 and returned for 8  treatment sessions. Patient had 0 missed visits.     Patient's treatment included:  - Therapeutic exercise/activities  - Modalities for pain management  - ROM and strengthening      Patient's therapy goals  were met.     Patient is D/C from outpatient occupational therapy secondary to all goals met.

## 2017-05-25 NOTE — PROGRESS NOTES
"OT Daily Progress Note    Patient:  Alivia Marie Cyr  Clinic #:  [id number]   Date of Note: 05/25/2017   Referring Physician:  Velma Garcia, *  Diagnosis:  Shoulder Pain/Bicep Tendonitis/Shoulder Impingement  1. Left arm pain     2. Acute pain of left shoulder         Start Time: 10 am  End Time: 10:10 am  Total Time: 10 min  Group Time: -    Visit 10 of 20 authorized  MEDICARE/DASH visit #10    Subjective:   "I don't have time to stay today."  Pain: 2 out of 10     Objective:   Patient seen by OT this session. Treatment  consist of the following:  Therapeutic exercises    Reassessment as follows:    UE Range of Motion  Active Range of Motion:   Shoulder Left - Eval Left - 4/28/17 Left - 5/25/17   Flexion 155 165 160   Abduction 150 168 155   ER at 0 80 80 60   ER at 90 70 80 80   IR 60 60 65   Horz Adduct 20 30 31         OUTCOME MEASURE:FOTO     Category: Carrying, Handling, Moving Objects      Current Score = 89% or 11% impaired (-8)  Goal at Discharge Score = 71% or 29% impaired    Score interpretation is as follows:   TEST SCORE Modifier Impairment Limitation Restriction   0% CH 0 % impaired, limited or restricted   1-19% CI @ least 1% but less than 20% impaired, limited or restricted   20-39% CJ @ least 20%<40% impaired, limited or restricted   40-59% CK @ least 40%<60% impaired, limited or restricted   60-79% CL @ least 60% <80% impaired, limited or restricted   80-99% CM @ least 80%<100% impaired limited or restricted   100% % impaired, limited or restricted       Treatment: Reassess x 10 min     Assessment:  Pt has met all goals. Shoulder AROM has stayed about the same since last reassessment.     New/Revised Goals: Continue POC  1) Independent with HEP-----met  2) Pt will demonstrate at least 160 degrees of motion in left shoulder abduction plane of movement for improved participation in house keeping tasks including reaching into cabinets to get supplies. -----met  3) Pt will demonstrate " "negative provocative testing for HK, Obriens, and Empty Can.-----met  4) Independent and pain free with ADL's and IADL's-----met  5) Patient to score at 70% or more on FOTO to demonstrate improved perception of functional LUE use.-----met  6) Patient will report at least 1-2 out of 10 on VAS for improved overall function and decreased pain.-----met      Plan:  Discharge to home with HEP.        Student: KASHIF Salter    " I certify that I was present in the room directing the student in service delivery and guiding them using my skilled judgement. As the co-signing therapist, I have reviewed the student's documentation and am responsible for the treatment, assessment and plan."    Therapist: KATHLEEN Chaudhari, IRAM   "

## 2017-05-25 NOTE — PROGRESS NOTES
Subjective:      Patient ID: Alivia Thmoas is a 51 y.o. female.    Chief Complaint: Back Pain and Knee Pain    Referred by: No ref. provider found     Interval History 5/25/17:   The patient returns today for follow up. She is s/p left L2,3,4,5 cooled RFA on 4/26/17 and right L2,3,4,5 cooled RFA on 5/9/17. She reports 80% relief of her back pain. She continues to report bilateral knee pain that is worse with prolonged walking. She reports that she is exercising 5 days a week. She continues to take care of her elderly father. She continues to take Percocet as needed for pain with relief. She denies any other health changes. She denies any bowel or bladder incontinence. Her pain today is 4/10.     Interval History 4/11/2017:  The patient returns today for follow up and medication refill.  She has increased her dieting and exercise for weight loss.  She has lost 14 lbs since her last visit.  She is very excited about this.  She is walking 6 days per week.  She also stays active taking her of her elderly father.  She has given up meat for lent.  She continues to follow up with bariatrics.  Her biggest complaint today is lower back pain.  She previously had benefit with cooled lumbar RFAs and would like to schedule repeats.  Her pain is worse with prolonged standing and bending.  She is taking Percocet as needed for pain without adverse effects.  Her pain today is 6/10.  The patient denies any bowel or bladder incontinence or signs of saddle paresthesia.  The patient denies any major medical changes since last office visit.    Interval History 3/14/2017:  The patient returns today for follow up of back and knee pain.  She continues with measures for weight loss.  She is still planning on bariatric surgery but would like to lose weight on her own prior to this.  She has lost about 4 lbs since her visit with me last month.  She continues to take Percocet which helps her pain without adverse effects.  Her pain today is  8/10.  The patient denies any bowel or bladder incontinence or signs of saddle paresthesia.  The patient denies any major medical changes since last office visit.    Interval History 2/15/2017:  The patient returns today for follow up and medication refill.  She is still planning on having weight loss surgery in the future.  She admits that she has not been as active as previously.  She has a follow up with Dr. Swan scheduled next month.  She continues to take Percocet with benefit.  Her pain today is 8/10.      Interval History 1/18/2017:  The patient returns for follow up and medication refill.  She reports no major changes in her back and knee pain since her last visit.  She has had a lot going on with health issues of family members.  She takes care of her father and her brother, who were both recently hospitalized.  She still plans on having weight loss surgery in the future.  Her pain today is.  She is taking Percocet with benefit and without side effects at this time.    Interval History 12/15/2016:  The patient returns today for follow up of lower back and bilateral knee pain.  She continues to report relief from cooled lumbar RFAs in October.  She feels as though the colder weather is causing increased knee pain.  Since her last visit, she has decided to have weight loss surgery.  She was evaluated by bariatrics and is undergoing pre-op workup at this time.  Her pain today is 8/10.  She continue to take Percocet with significant benefit.      Interval History 11/3/2016:  The patient returns today for follow up of back pain.  She is s/p left then right L2,3,4,5 cooled RFA completed on 10/19/16 with 80% pain relief.  She is very satisfied with these results.  She continues to take Percocet with relief.  She has started kick boxing classes and continues to lose weight.  She has noticeable weight loss since her last visit and is very happy about this.  Her pain today is 8/10.    Interval History  9/16/2016:  The patient returns today with complaints of lower back and knee pain.  Her worst pain is in her lower back without radiation.  She has lost weight since her last weight.  She has increased her exercise which is helping.  She continues with her diet plan.  She is having the pool at her home fixed and plans to use this for exercise, as she has benefited from frequent pool therapy at [facility].  She previously had significant relief with lumbar RFAs in April for about 4 months.  She would like to repeat the procedures.  She continues to take Percocet as needed which provides her relief.  Her pain today is 8/10.  The patient denies any bowel or bladder incontinence or signs of saddle paresthesia.      Interval History 8/19/2016:  The patient returns today for follow up and medication refill.  She complains of back and knee pain.  Her back pain does not radiate.  She did have relief with RFA in April but feels the pain is returning.  Her previous UTI has resolved.  She has been unable to return to her aquatic therapy because she is caring for her father.  She plans to start walking in the morning before her father wakes up.  She has gained a few pounds since her last visit and is upset about this, as she was previously losing at each visit.  She is also trying to control her diet.  She continues to take Percocet with significant pain relief.  Her pain today is 8/10.  The patient denies any bowel or bladder incontinence or signs of saddle paresthesia.  The patient denies any major medical changes since last office visit.    Interval History 7/19/2016:  The patient returns today for follow up.  She has a history of lower back and bilateral knee pain.  Since her last visit, she reports being diagnosed with a UTI and is currently on antibiotics. She has still been unable to return to aquatherapy.  She has been performing a home exercise routine.  She has lost 6 lbs since her visit last month.  She is taking  Percocet as needed for pain.  She reports efficacy without adverse effects.  Her pain today is 7/10.      Interval History 6/20/2016:  Patient returns for follow up and medication refill.  She has a history of lower back and bilateral knee pain.  Since her last encounter, she reports that she has been suffering with an upper respiratory infection.  She saw Dr. Richardson last week and was started on oral Augmentin and antibiotic eye drops.  She reports that whenever she is sick, she suffers with swelling and drainage to her left eye.  She states that her symptoms have improved since starting the eye drops.  She has been unable to participate in her daily pool therapy since she has been sick but is anxious to resume once she is feeling better.  She did have recent labwork which showed an improving A1C with cholesterol and triglycerides WNL.  She is proud of herself about this, as she reports be very good with her diet recently.  Her pain today is an 8/10.    Interval History 5/5/2016:  Patient returns today for procedure follow up.  She is s/p left then right L2,3,4,5 RFA completed on 4/20/16 with 70% pain relief so far.  She reports lower back and bilateral knee pain.  She has had genicular nerve blocks in the past which provided significant relief for her left knee pain and limited relief of her right knee pain.  She is currently doing physical therapy per self.  She is doing 45 minutes of pool therapy five days per week.  She thinks that this is helping with her pain and mobility.  She is trying to lose weight because she is aware that this will help with her pain.  She is taking percocet as needed which provided relief without side effects.  Her pain today is a 5/10.      Interval History: 3/28/2016:  Patient returns today for follow up of lower back and bilateral knee pain.  She is s/p Bilateral L2,3,4,5 MBB on 2/16/16 with 80% relief for 5 days and bilateral L2,3,4,5 MBB on 3/1/16 with 70% pain relief for 1  day.  She is requesting to schedule the RFAs.  The worst of her pain is located to her left lower back and does not radiate. She is still complaining of bilateral knee pain which is worse with walking and activity.  Her pain today is a 9/10.  The patient denies any bowel/bladder incontinence or symptoms of saddle paresthesia.  The patient denies any major medical changes since last OV. She is currently taking Percocet which helps her pain without any adverse effects.     Interval History: 12/17/2015:  Patient presents in clinic for follow up for lower back, bilateral knee, and right arm pain. Her pain is 9/10 today. She underwent carpal tunnel revision 12/14/15 in the right wrist. She is currently out of her Percocet 10-325mg prescription.   Cont to have significant low back pain, wh will also ich she reports is her worst current pain.  Based on previous imaging we know that the patient has significant facet arthropathy in the lumbar spine at the levels of L3-4 and L4-5 L5-S1.  She describes dull achy with occasional sharp pain rates as 7/10.     Interval history 10/26/2015:  Patient returns to clinic for follow up previously seen for knee pain and low back pain. She reports pain is improved with Percocet 10/325 BID. She is no longer taking Lyrica and recently started Topamax. She continues to take Celebrex once per day and does help. She also continues to use a topical cream PRN and does help some. She has completed therapy since last visit but she is continuing to exercise regularly. Her pain in back and knees is unchanged in quality and distribution from previous visits. She otherwise denies any new issues at this time.     Interval history 9/21/2015:  Since previous encounter the patient comes in after having started using gabapentin and developing swelling in her legs.  She states that it did make a difference for her pain although she discontinued it secondary to swelling after a decrease in her dosing did not  alleviate this.  She followed up with her orthopedist and did have x-rays performed which did not show any significant change compared to previous.  She is scheduled for a four-month follow-up.  She has not yet begun exercising but will begin soon she is scheduled for pool-based therapy.  The opioid medications have been helping her and her pain twice a day.  Further decrease to once a day prevented her from being able to function.  She does continue to take Celebrex without adverse reaction.  Additionally the patient stated that she has been having lower back pain which is new.    Interval History 08-: Since previous visit patient comes in today to discuss her medications.  Patient states she is having pain in the lower back and both knee, sharp , throbbing , burning, and stabbing pain, she rates it 8/10.  Patient is taking percocet 10/325mg, we have been weaning her from this medication last prescription was provided for 30 tablets.  Additionally the patient is taking Celebrex with regularity with some improvement in her pain.  She has completed physical therapy status post knee replacement her knee hardware appears appropriate she has a scheduled appointment to follow-up with her orthopedic surgeon in one week to discuss using a extension brace while she is at home laying in bed.  She continues to swim twice a week and is actively trying to lose weight and has been dieting.    Interval History 06/19/2015:  Patient presents in clinic for two month follow up. She reports her bilateral knee pain and low back pain is an 8/10. She currently takes celebrex and Percocet for pain and uses a topical cream.  She was recently evaluated by her orthopedist and the hardware all appears to be appropriately placed and the next follow-up is in 6 weeks.  The patient continues to work on weight loss and exercise and states that she has been doing more than in the past.   Patient reports no other health changes since previous  encounter.    Interval History 04/09/2015:  Patient presents in clinic for one month follow up. She reports bilateral knee pain and low back pain is a 9/10 today. She currently takes percocet for pain as needed and uses a cane for ambulation. She states that the low back pain is new.  She continues to have bilateral knee pain and is in physical therapy.  She continues to take Celebrex daily and uses a topical pain cream regularly.    Patient reports no other health changes since previous encounter.    Interval history 3/5/2015:  Since previous encounter patient is status post right total knee replacement on 11/4/2014 and has been healed with postoperative visits showing good progress.  The patient does have continued physical therapy sessions and has been attempting to lose weight and has lost approximately 25 pounds although her BMI continues to be 54.  She has been making good efforts to try and continue to increase her range of motion and lose weight.  She continues to have significant pain in bilateral knees and continues to use a cane for ambulation.  She was receiving oxycodone/acetaminophen 10/325 every 8 hours by mouth when necessary and requiring in order to persist in her physical therapy although the topical pain cream that she has been applying has been helping her to a limited degree she still requires the medication regularly.  Her recent x-ray imaging shows good positioning of the prosthesis.  No other health changes since previous encounter.     Interval history 2/17/2014:  Patient reports that she has been using the topical compounded cream on the knee approximately 4 times per day and she states that it does help her with her pain that she is experiencing.  She states that she has not gone to formal physical therapy but that she has been going to a pool that has exercise classes which is free for her and that she feels like it is helping her continue to lose weight.  Additionally she continues  using hydrocodone/acetaminophen 7.5/750 approximately 3 times per day when necessary and states that also helps with her pain symptoms.  He she's still talks to have replacement for the left knee, but would like to lose weight further before going to that.  She has also had previous injections into her knees which have offered little to no relief in her pain symptoms.  She has had no other health changes since previous encounter.    Previous encounter 1/21/2014:  HPI Comments: 47 yo female presents for initial evaluation of bilateral knee pain, L>R. She is s/p arthroscopic right knee surgery and eventual replacement and then revision surgeries (Dr. Swan, Orthopedics). The pain is present in both knees and is described as a terrible ache. She hears occasional popping in both knees with movement. The pain is worse with being on her feet and getting up from a sitting to standing position. Denies lower ext weakness or paresthesias. She does not have back pain or pain radiating down her legs. The pain is better with Celebrex and rest. She also takes Vicodin ES TID but this makes her very sleepy. She is not sure it helps with the pain because she usually falls asleep.     Physical Therapy: not since 2011 just prior to her 3rd right knee surgery (revision after replacement); has tried swimming which helps with weight loss    Non-pharmacologic Treatment: none    Pain Medications: Percocet and celebrex    Blood thinners: ASA 81 mg daily     Interventional Therapies: steroid inj and visco-supplementation (series of 3) in left knee- not helpful  3/31/14 Bilateral genicular nerve block- significant relief of left knee, limited relief of right knee pain  2/16/16 Bilateral L2,3,4,5 MBB  3/1/16 Bilateral L2,3,4,5 MBB   4/6/16 Left L2,3,4,5 RFA- significant relief  4/20/16 Right L2,3,4,5 RFA- significant relief  10/5/16 Left L2,3,4,5 cooled RFA  10/19/16 Right L2,3,4,5 cooled RFA  4/26/17 Left L2,3,4,5 cooled RFA  5/9/17 Right  L2,3,4,5 cooled RFA      Relevant Surgeries: right knee surgery x3 (arthroscopic, then replacement and subsequent revision surgery), s/p left total knee arthroplasty    Relevant Imaging:  Xray Lumbar spine 09/21/2015  Lumbar spine radiograph    Comparison: None    Results: AP, lateral neutral, lateral flexion , lateral extension, bilateral oblique and spot views. The alignment of the lumbar spine demonstrates a mild levoscoliosis . 11-mm anterior listhesis of L4 relative to L5 with no translational abnormalities  seen on flexion and extension views. The vertebral body heights are well-maintained , mild disk space narrowing L4-L5 and L5-S1. Mild anterior and marginal osteophyte formation seen throughout the lumbar spine . The oblique views demonstrate no   definite spondylolysis. There is facet joint osseous hypertrophy noted at L3-L4 and L4-L5.      Impression         The Significant spondylosis of the lumbar spine with grade 1 anterior listhesis L4 relative to L5.  Facet joint osseous hypertrophy L3-L4 and L4-5.      Electronically signed by: BLESSING ROE MD  Date: 09/21/15  Time: 09:56           knee x-rays (see below) x-ray knee bilateral 8/26/2015:  Standing AP knees, lateral view of both knees and sunrise view of both patella. Study compared to May 2015. Postop change of bilateral knee replacement. The prosthetic components are in satisfactory position. Erosive changes involving the patella   again evident bilaterally.    Impression no significant change.      Xray Bilateral Knee 05/25/2015:  There are bilateral total knee arthroplasties with posterior resurfacing of the patella. As observed on 12/17/2014 there is a fracture of the left patella in the parasagittal plane.    Heterotopic bone is evident about each knee.    I detect no dislocation, unusual radiopaque retained foreign body, lytic or blastic lesion, or chondrocalcinosis.    Lab Results   Component Value Date    HGBA1C 9.9 (H) 05/09/2017      Lab Results   Component Value Date    CHOL 191 05/09/2017    CHOL 200 (H) 12/15/2016    CHOL 153 06/17/2016     Lab Results   Component Value Date    HDL 48 05/09/2017    HDL 40 12/15/2016    HDL 44 06/17/2016     Lab Results   Component Value Date    LDLCALC 129.0 05/09/2017    LDLCALC 141.0 12/15/2016    LDLCALC 92.6 06/17/2016     Lab Results   Component Value Date    TRIG 70 05/09/2017    TRIG 95 12/15/2016    TRIG 82 06/17/2016     Lab Results   Component Value Date    CHOLHDL 25.1 05/09/2017    CHOLHDL 20.0 12/15/2016    CHOLHDL 28.8 06/17/2016         Past Medical History:   Diagnosis Date    Asthma     Diabetes mellitus type II     DJD (degenerative joint disease) of knee 6/19/2014    Hyperlipidemia     Morbid obesity     Sleep apnea      Past Surgical History:   Procedure Laterality Date    CARPAL TUNNEL RELEASE      CARPAL TUNNEL RELEASE  1980s    left    CARPAL TUNNEL RELEASE  2012    right    JOINT REPLACEMENT Bilateral     with 2 revisions on rt    KNEE SURGERY  3/2010    orthroscope    KNEE SURGERY  6-19-14    left TKR     Family History   Problem Relation Age of Onset    Diabetes Mother     Hypertension Mother     Cataracts Mother     Diabetes Father     Cataracts Father     Coronary artery disease Brother     Amblyopia Neg Hx     Blindness Neg Hx     Cancer Neg Hx     Glaucoma Neg Hx     Macular degeneration Neg Hx     Retinal detachment Neg Hx     Strabismus Neg Hx     Stroke Neg Hx     Thyroid disease Neg Hx      Social History     Social History    Marital status: Single     Spouse name: N/A    Number of children: N/A    Years of education: N/A     Occupational History    Not on file.     Social History Main Topics    Smoking status: Never Smoker    Smokeless tobacco: Never Used    Alcohol use Yes      Comment: occasionally     Drug use: No    Sexual activity: Yes     Partners: Female     Birth control/ protection: None     Other Topics Concern    Not on  file     Social History Narrative    Disabled. The patient is the youngest of 6 siblings. Single. Lives with single-sex partner.                      Review of patient's allergies indicates:  No Known Allergies    Current Outpatient Prescriptions:     albuterol 90 mcg/actuation inhaler, Take 2 puffs every 4-6 hours  as needed for shortness of breath/wheezing, Disp: 1 Inhaler, Rfl: 6    aspirin (ECOTRIN) 81 MG EC tablet, Take 1 tablet by mouth every morning. prevents heart attacks and strokes, Disp: , Rfl:     atorvastatin (LIPITOR) 20 MG tablet, Take 1 tablet (20 mg total) by mouth once daily., Disp: 30 tablet, Rfl: 6    blood sugar diagnostic Strp, Freestyle light strips and lancets, Disp: 100 each, Rfl: 6    celecoxib (CELEBREX) 200 MG capsule, ONE CAPSULE DAILY, Disp: 30 capsule, Rfl: 2    econazole nitrate 1 % cream, Apply topically 2 (two) times daily., Disp: 30 g, Rfl: 2    fluconazole (DIFLUCAN) 150 MG Tab, One daily for 3 days, Disp: 3 tablet, Rfl: 3    insulin detemir (LEVEMIR FLEXPEN) 100 unit/mL (3 mL) SubQ InPn pen, Inject 20 Units into the skin every evening., Disp: 1 Box, Rfl: 11    insulin lispro (HUMALOG KWIKPEN) 100 unit/mL InPn pen, 11 Units before meals plus sliding scale, Disp: 1 Box, Rfl: 6    insulin lispro (HUMALOG) 100 unit/mL injection, Inject 11 Units into the skin 3 (three) times daily before meals. Glucose        Intervention  (units to add in addition to meal time Humalog base dose of 11 Units)                       0-80    orange juice            0 units    151 - 200       +2 Units    201 - 250       +4 Units    251 - 300       +6 Units    301 - 350        +8 Units    351 - 400       +10 Units    > 400           Call MD, Disp: 3 mL, Rfl: 11    insulin lispro (HUMALOG) 100 unit/mL injection, 11 Units before meals plus sliding scale, Disp: 5 vial, Rfl: 6    lisinopril (PRINIVIL,ZESTRIL) 2.5 MG tablet, One daily for proteinuria., Disp: 30 tablet, Rfl: 6    metformin  "(GLUCOPHAGE-XR) 500 MG 24 hr tablet, Take 2 tablets (1,000 mg total) by mouth 2 (two) times daily., Disp: 360 tablet, Rfl: 6    omeprazole (PRILOSEC) 20 MG capsule, Take 1 capsule (20 mg total) by mouth every morning., Disp: 30 capsule, Rfl: 6    [START ON 6/9/2017] oxycodone-acetaminophen (PERCOCET)  mg per tablet, Take 1 tablet by mouth every 6 (six) hours as needed for Pain., Disp: 120 tablet, Rfl: 0    vitamin D 185 MG Tab, Take 5,000 mg by mouth once daily., Disp: , Rfl:     REVIEW OF SYSTEMS:    GENERAL:  Patient is actively losing weight.  RESPIRATORY:  Negative for cough, wheezing or shortness of breath, patient denies any recent URI.  CARDIOVASCULAR:  Negative for chest pain, leg swelling or palpitations.  GI:  Negative for abdominal discomfort, blood in stools or black stools or change in bowel habits, occasional constipation.  MUSCULOSKELETAL:  See HPI.  SKIN:  Negative for lesions, rash, and itching.  PSYCH:  No mood disorder or recent psychosocial stressors.  Patient's sleep is disturbed secondary to pain (patient reports also that she has insomnia).  HEMATOLOGY/LYMPHOLOGY:  Negative for prolonged bleeding, bruising easily or swollen nodes.  81 mg aspirin  ENDO: Patient has a history of diabetes  NEURO:   No history of headaches, syncope, paralysis, seizures or tremors.  All other reviewed and negative other than HPI.    OBJECTIVE:    /77   Pulse 77   Temp 98.2 °F (36.8 °C)   Ht 5' 5" (1.651 m)   Wt (!) 161 kg (354 lb 15.1 oz)   LMP 03/13/2017   BMI 59.07 kg/m²     PHYSICAL EXAMINATION:    GENERAL: Well appearing, in no acute distress, alert and oriented x3.   PSYCH:  Mood and affect appropriate.  SKIN: Skin color, texture, turgor normal, no rashes or lesions.  HEAD/FACE:  Normocephalic, atraumatic.   CV: RRR with palpation of the radial artery.  PULM: No evidence of respiratory difficulty, symmetric chest rise.  BACK: No pain to palpation over the lumbar facet joints.  Full ROM " with mild pain on extension.  Positive bilateral facet loading. Negative SLR bilaterally.  Pain with palpation to bilateral SI joints.  JENNA is negative bilaterally.  EXTREMITIES:  Well-healed midline scars to bilateral knees.  Painful extension and flexion of bilateral knees.  Medial joint line tenderness to bilateral knees.    MUSCULOSKELETAL: Bilateral upper and lower extremity strength is normal and symmetric.  No atrophy or tone abnormalities are noted.  NEURO: Bilateral lower extremity coordination and muscle stretch reflexes are physiologic and symmetric.  Plantar response are downgoing. No clonus.  No loss of sensation is noted.  GAIT: Antalgic- ambulating without assistance.       Assessment:       Encounter Diagnoses   Name Primary?    Facet arthritis of lumbar region Yes    Chronic pain of both knees     Chronic pain disorder          Plan:       - Previous imaging was reviewed and discussed with the patient today.     - Continue to f/u with bariatrics for possible gastric sleeve surgery.      - The patient will continue a home exercise routine to help with pain and strengthening.  She is walking and/or riding her bike daily.  We had a long discussion regarding the importance of maintaining proper weight in pain control.    - She is s/p bilateral lumbar cooled RFA with relief. We can repeat this in 6 months if needed.     - Continue oxycodone-acetaminophen 10/325 one tablet every 6 hours PRN pain, #120, 0 refills. She may call for a refill.     - The [place] Board of Pharmacy website for prescription monitoring was consulted today, and it does not suggest any deviations in conflict with the patient's controlled substance contract with our clinic. Will continue current therapy with frequent monitoring of the controlled substance database, and urine drug screens on followup.     - UDS from 1/18/17 was reviewed and is compliant.  No UDS ordered today.    - Continue topical pain cream PRN.    - RTC in 3  months or sooner if needed.     The above plan and management options were discussed at length with patient. Patient is in agreement with the above and verbalized understanding.     Lina Wagner MD  05/25/2017

## 2017-05-30 ENCOUNTER — OFFICE VISIT (OUTPATIENT)
Dept: ORTHOPEDICS | Facility: CLINIC | Age: 52
End: 2017-05-30
Payer: MEDICARE

## 2017-05-30 VITALS
HEART RATE: 86 BPM | SYSTOLIC BLOOD PRESSURE: 128 MMHG | HEIGHT: 65 IN | WEIGHT: 293 LBS | BODY MASS INDEX: 48.82 KG/M2 | DIASTOLIC BLOOD PRESSURE: 86 MMHG

## 2017-05-30 DIAGNOSIS — M25.811 SHOULDER IMPINGEMENT, RIGHT: Primary | ICD-10-CM

## 2017-05-30 PROCEDURE — 99999 PR PBB SHADOW E&M-EST. PATIENT-LVL II: CPT | Mod: PBBFAC,,, | Performed by: ORTHOPAEDIC SURGERY

## 2017-05-30 PROCEDURE — 99213 OFFICE O/P EST LOW 20 MIN: CPT | Mod: S$PBB,,, | Performed by: ORTHOPAEDIC SURGERY

## 2017-05-30 PROCEDURE — 99212 OFFICE O/P EST SF 10 MIN: CPT | Mod: PBBFAC | Performed by: ORTHOPAEDIC SURGERY

## 2017-05-30 NOTE — PROGRESS NOTES
I have personally taken the history and examined the patient. I agree with the Hand Surgery PA's note. The plan will be cont conservative treatment. Pt did well with therapy and injection. Pt has FROM shoulder.

## 2017-05-30 NOTE — PROGRESS NOTES
This office note has been dictated.   Dictation Confirmation Code: [id number]  Anisa Dietz PA-C  05/30/2017  9:22 AM  Supervising Physician:  MD Alivia Clancy was seen today for pain.    Diagnoses and all orders for this visit:    Shoulder impingement, right

## 2017-05-31 NOTE — PROGRESS NOTES
SUBJECTIVE:  Ms. Shearer is eight weeks' status post injection of the left   shoulder and therapy.  She was very happy with her therapist, feels that she had   full range of motion.  She has minimal pain.  No complaints today.  She is very   happy with her results.    PHYSICAL EXAMINATION:  VITAL SIGNS:  Per Epic.  UPPER EXTREMITY EXAM:  She has full range of motion of the shoulder and no pain   on range of motion.  Passively, I am able to forward flex her to 160 degrees.    She is able to abduct to 120 degrees without pain.  Internal rotation, L4-L5,   but without pain.  No tenderness at the level of the biceps.    ASSESSMENT:  Shoulder bursitis/impingement, resolved.    PLAN:  Discussed with Ms. Shearer, she is doing well from her injection and   therapy.  At this time, she can follow up p.r.n.  She will contact us with   questions or concerns.    SUPERVISING PHYSICIAN:  Velma Garcia M.D.    DICTATED BY:  CIERA Hameed.      [place]  dd: 05/30/2017 09:23:48 (CDT)  td: 05/31/2017 01:37:48 (CDT)  Doc ID   #[id number]  Job ID #[id number]    CC:

## 2017-06-07 ENCOUNTER — TELEPHONE (OUTPATIENT)
Dept: OBSTETRICS AND GYNECOLOGY | Facility: CLINIC | Age: 52
End: 2017-06-07

## 2017-06-07 NOTE — TELEPHONE ENCOUNTER
----- Message from Carmen Watson sent at 6/6/2017  4:36 PM CDT -----  Contact: Self  Pt is calling in regards of stating that her cycle is still down. The pt can be reached at [phone]. Thanks kG

## 2017-06-09 ENCOUNTER — TELEPHONE (OUTPATIENT)
Dept: OBSTETRICS AND GYNECOLOGY | Facility: CLINIC | Age: 52
End: 2017-06-09

## 2017-06-09 NOTE — TELEPHONE ENCOUNTER
----- Message from Diaz Dobson sent at 6/9/2017 10:14 AM CDT -----  Contact: Pt  X_ 1st Request  _ 2nd Request  _ 3rd Request    Who:DONTAE MOON [[id number]]    Why: Patient is returning a call    What Number to Call Back: [phone]    When to Expect a call back: (Before the end of the day)  -- if call after 3:00 call back will be tomorrow.

## 2017-06-09 NOTE — TELEPHONE ENCOUNTER
I spoke to the pt and she states that she has been bleeding for three weeks. Pt asked if the fibroid she had had something to do with it or can she get medicine to stop the bleeding she is going out of town in july. Alivia states that she wears a tampon and a pad .  I scheduled the pt and appt to come in and be seen for the bleeding

## 2017-06-12 ENCOUNTER — TELEPHONE (OUTPATIENT)
Dept: OBSTETRICS AND GYNECOLOGY | Facility: CLINIC | Age: 52
End: 2017-06-12

## 2017-06-12 NOTE — TELEPHONE ENCOUNTER
----- Message from Rosa Rodriges NP sent at 6/12/2017 10:25 AM CDT -----  Regarding: needs EMBX  This pt is scheduled to see me on Friday and will need an EMBX. Please reschedule her with a MD, as I have not been checked off on EMBX yet.

## 2017-06-12 NOTE — TELEPHONE ENCOUNTER
I spoke to the pt and eugene her appt with Dr Silva . The pt states that the bleeding is slowing down but will make an appt for july

## 2017-07-05 ENCOUNTER — OFFICE VISIT (OUTPATIENT)
Dept: PODIATRY | Facility: CLINIC | Age: 52
End: 2017-07-05
Payer: MEDICARE

## 2017-07-05 VITALS
SYSTOLIC BLOOD PRESSURE: 126 MMHG | DIASTOLIC BLOOD PRESSURE: 86 MMHG | HEART RATE: 79 BPM | WEIGHT: 293 LBS | HEIGHT: 65 IN | BODY MASS INDEX: 48.82 KG/M2

## 2017-07-05 DIAGNOSIS — E11.49 TYPE II DIABETES MELLITUS WITH NEUROLOGICAL MANIFESTATIONS: ICD-10-CM

## 2017-07-05 DIAGNOSIS — R60.0 BILATERAL LOWER EXTREMITY EDEMA: ICD-10-CM

## 2017-07-05 DIAGNOSIS — B35.1 DERMATOPHYTOSIS OF NAIL: Primary | ICD-10-CM

## 2017-07-05 PROCEDURE — 99499 UNLISTED E&M SERVICE: CPT | Mod: S$PBB,,, | Performed by: PODIATRIST

## 2017-07-05 PROCEDURE — 99213 OFFICE O/P EST LOW 20 MIN: CPT | Mod: PBBFAC | Performed by: PODIATRIST

## 2017-07-05 PROCEDURE — 11721 DEBRIDE NAIL 6 OR MORE: CPT | Mod: Q9,PBBFAC | Performed by: PODIATRIST

## 2017-07-05 PROCEDURE — 99999 PR PBB SHADOW E&M-EST. PATIENT-LVL III: CPT | Mod: PBBFAC,,, | Performed by: PODIATRIST

## 2017-07-05 NOTE — PROGRESS NOTES
CC:     Foot exam     HPI:   The patient is a 51 y.o.  female  who presents for a diabetic foot exam.   Patient reports + presence of abnormal sensation to the feet, just sometimes.   Otherwise states that feet feel great since her last visit.   Patient has no history of wounds on the feet.   Hx of foot surgery: none.   Shoe gear: athletic type  This patient has documented high risk feet requiring routine maintenance secondary to diabetes.  Main concern today is toenail debridement.        Primary care doctor is: Clarisse Richardson MD  Patient last saw primary care doctor on:     Chief Complaint   Patient presents with    PCP     Ella 05/05/2017    Diabetic Foot Exam    Nail Care              Past Medical History:   Diagnosis Date    Asthma     Diabetes mellitus type II     DJD (degenerative joint disease) of knee 6/19/2014    Hyperlipidemia     Morbid obesity     Sleep apnea          Current Outpatient Prescriptions on File Prior to Visit   Medication Sig Dispense Refill    albuterol 90 mcg/actuation inhaler Take 2 puffs every 4-6 hours  as needed for shortness of breath/wheezing 1 Inhaler 6    aspirin (ECOTRIN) 81 MG EC tablet Take 1 tablet by mouth every morning. prevents heart attacks and strokes      atorvastatin (LIPITOR) 20 MG tablet Take 1 tablet (20 mg total) by mouth once daily. 30 tablet 6    blood sugar diagnostic Strp Freestyle light strips and lancets 100 each 6    celecoxib (CELEBREX) 200 MG capsule ONE CAPSULE DAILY 30 capsule 2    econazole nitrate 1 % cream Apply topically 2 (two) times daily. 30 g 2    insulin detemir (LEVEMIR FLEXPEN) 100 unit/mL (3 mL) SubQ InPn pen Inject 20 Units into the skin every evening. 1 Box 11    insulin lispro (HUMALOG KWIKPEN) 100 unit/mL InPn pen 11 Units before meals plus sliding scale 1 Box 6    insulin lispro (HUMALOG) 100 unit/mL injection Inject 11 Units into the skin 3 (three) times daily before meals. Glucose        Intervention  (units  to add in addition to meal time Humalog base dose of 11 Units)                        0-80    orange juice             0 units     151 - 200       +2 Units     201 - 250       +4 Units     251 - 300       +6 Units     301 - 350        +8 Units     351 - 400       +10 Units     > 400           Call MD 3 mL 11    insulin lispro (HUMALOG) 100 unit/mL injection 11 Units before meals plus sliding scale 5 vial 6    lisinopril (PRINIVIL,ZESTRIL) 2.5 MG tablet One daily for proteinuria. 30 tablet 6    metformin (GLUCOPHAGE-XR) 500 MG 24 hr tablet Take 2 tablets (1,000 mg total) by mouth 2 (two) times daily. 360 tablet 6    omeprazole (PRILOSEC) 20 MG capsule Take 1 capsule (20 mg total) by mouth every morning. 30 capsule 6    oxycodone-acetaminophen (PERCOCET)  mg per tablet Take 1 tablet by mouth every 6 (six) hours as needed for Pain. 120 tablet 0    vitamin D 185 MG Tab Take 5,000 mg by mouth once daily.       No current facility-administered medications on file prior to visit.        Review of patient's allergies indicates:  No Known Allergies          ROS:  General ROS: negative  Respiratory ROS: no cough, shortness of breath, or wheezing  Cardiovascular ROS: no chest pain or dyspnea on exertion  Musculoskeletal ROS: negative  Neurological ROS:   negative for - gait disturbance, numbness/tingling, seizures or tremors  Dermatological ROS: negative          LAST HbA1c:   Hemoglobin A1C   Date Value Ref Range Status   05/09/2017 9.9 (H) 4.5 - 6.2 % Final     Comment:     According to ADA guidelines, hemoglobin A1C <7.0% represents  optimal control in non-pregnant diabetic patients.  Different  metrics may apply to specific populations.   Standards of Medical Care in Diabetes - 2016.  For the purpose of screening for the presence of diabetes:  <5.7%     Consistent with the absence of diabetes  5.7-6.4%  Consistent with increasing risk for diabetes   (prediabetes)  >or=6.5%  Consistent with  "diabetes  Currently no consensus exists for use of hemoglobin A1C  for diagnosis of diabetes for children.     12/15/2016 10.6 (H) 4.5 - 6.2 % Final     Comment:     According to ADA guidelines, hemoglobin A1C <7.0% represents  optimal control in non-pregnant diabetic patients.  Different  metrics may apply to specific populations.   Standards of Medical Care in Diabetes - 2016.  For the purpose of screening for the presence of diabetes:  <5.7%     Consistent with the absence of diabetes  5.7-6.4%  Consistent with increasing risk for diabetes   (prediabetes)  >or=6.5%  Consistent with diabetes  Currently no consensus exists for use of hemoglobin A1C  for diagnosis of diabetes for children.     06/17/2016 9.0 (H) 4.5 - 6.2 % Final         EXAM:   Vitals:    07/05/17 0735   BP: 126/86   Pulse: 79   Weight: (!) 156.9 kg (346 lb)   Height: 5' 5" (1.651 m)       General: Pt. is well-developed, well-nourished, appears stated age, in no acute distress, alert and oriented x 3.      Vascular: Dorsalis pedis and posterior tibial pulses are 2/4 bilaterally. 3 secs capillary refill time and toes are warm to touch.  There is reduced hair growth on the feet.  There is 1+ edema.  no varicosities.      Neurological:  Light touch, proprioception, and sharp/dull sensation are all intact bilaterally. Protective threshold with the Galata-Lindsay monofilament is diminished bilaterally.     Dermatological:  The skin is intact, warm.  The toenails  1-5 L and 1-5 R  are greenish- yellow, long by 5-6mm and thick by 2-3mm with subungual debris  .  There is no presence of hyperkeratotic lesions.  There are no open wounds. There is no ecchymoses.  no erythema noted.     Interdigital spaces are intact, no fissures Skin atrophic, thin, dry and mildly hyperpigmented.     Musculoskeletal:  Muscle strength is 5/5 in all groups bilaterally.  Metatarsophalangeal, subtalar, and ankle range of motion are intact without crepitus.           Assessment " / Plan:  Shoe inspection. Diabetic Foot Education. Patient reminded of the importance of good nutrition and blood sugar control to help prevent podiatric complications of diabetes. Patient instructed on proper foot hygiene. We discussed wearing proper shoe gear, daily foot inspections, never walking without protective shoe gear, never putting sharp instruments to feet, and routine diabetic foot exam and care every 3-6 months to prevent complications.      Dermatophytosis of nail    Bilateral lower extremity edema    Type II diabetes mellitus with neurological manifestations    Other orders  -     Foot Care         Routine Foot Care  Date/Time: 7/5/2017 8:05 AM  Performed by: JOSÉ ANTONIO WILLIS  Authorized by: JOSÉ ANTONIO WILLIS     Consent Done?:  Yes (Verbal)    Nail Care Type:  Debride  Location(s): All  (Left 1st Toe, Left 3rd Toe, Left 2nd Toe, Left 4th Toe, Left 5th Toe, Right 1st Toe, Right 2nd Toe, Right 3rd Toe, Right 4th Toe and Right 5th Toe)  Patient tolerance:  Patient tolerated the procedure well with no immediate complications     With patient's permission, the toenails mentioned above were aggressively reduced and debrided using a nail nipper, removing all offending nail and debris. The patient will continue to monitor the areas daily, inspect the feet, wear protective shoe gear when ambulatory, and moisturizer to maintain skin integrity.       This patient has documented high risk feet requiring routine maintenance secondary to diabetes     Return in about 3 months (around 10/5/2017) for foot care, or sooner if concerned.

## 2017-07-05 NOTE — PATIENT INSTRUCTIONS
YOUR a1c:    Hemoglobin A1C   Date Value Ref Range Status   05/09/2017 9.9 (H) 4.5 - 6.2 % Final     Comment:     According to ADA guidelines, hemoglobin A1C <7.0% represents  optimal control in non-pregnant diabetic patients.  Different  metrics may apply to specific populations.   Standards of Medical Care in Diabetes - 2016.  For the purpose of screening for the presence of diabetes:  <5.7%     Consistent with the absence of diabetes  5.7-6.4%  Consistent with increasing risk for diabetes   (prediabetes)  >or=6.5%  Consistent with diabetes  Currently no consensus exists for use of hemoglobin A1C  for diagnosis of diabetes for children.     12/15/2016 10.6 (H) 4.5 - 6.2 % Final     Comment:     According to ADA guidelines, hemoglobin A1C <7.0% represents  optimal control in non-pregnant diabetic patients.  Different  metrics may apply to specific populations.   Standards of Medical Care in Diabetes - 2016.  For the purpose of screening for the presence of diabetes:  <5.7%     Consistent with the absence of diabetes  5.7-6.4%  Consistent with increasing risk for diabetes   (prediabetes)  >or=6.5%  Consistent with diabetes  Currently no consensus exists for use of hemoglobin A1C  for diagnosis of diabetes for children.     06/17/2016 9.0 (H) 4.5 - 6.2 % Final       How to Check Your Feet    Below are tips to help you look for foot problems. Try to check your feet at the same time each day, such as when you get out of bed in the morning.    · Check the top of each foot. The tops of toes, back of the heel, and outer edge of the foot can get a lot of rubbing from poor-fitting shoes.    · Check the bottom of each foot. Daily wear and tear often leads to problems at pressure spots.    · Check the toes and nails. Fungal infections often occur between toes. Toenail problems can also be a sign of fungal infections or lead to breaks in the skin.    · Check your shoes, too. Loose objects inside a shoe can injure the foot. Use  your hand to feel inside your shoes for things like alie, loose stitching, or rough areas that could irritate your skin.        Diabetic Foot Care    Diabetes can lead to a number of different foot complications. Fortunately, most of these complications can be prevented with a little extra foot care. If diabetes is not well controlled, the high blood sugar can cause damage to blood vessels and result in poor circulation to the foot. When the skin does not get enough blood flow, it becomes prone to pressure sores and ulcers, which heal slowly.  High blood sugar can also damage nerves, interfering with the ability to feel pain and pressure. When you cant feel your foot normally, it is easy to injure your skin, bones and joints without knowing it. For these reasons diabetes increases the risk of fungal infections, bunions and ulcers. Deep ulcers can lead to bone infection. Gangrene is the most serious foot complication of diabetes. It usually occurs on the tips of the toes as blacked areas of skin. The black area is dead tissue. In severe cases, gangrene spreads to involve the entire toe, other toes and the entire foot. Foot or toe amputation may be required. Good foot care and blood sugar control can prevent this.    Home Care  1. Wear comfortable, proper fitting shoes.  2. Wash your feet daily with warm water and mild soap.  3. After drying, apply a moisturizing cream or lotion.  4. Check your feet daily for skin breaks, blisters, swelling, or redness. Look between your toes also.  5. Wear cotton socks and change them every day.  6. Trim toe nails carefully and do not cut your cuticles.  7. Strive to keep your blood sugar under control with a combination of medicines, diet and activity.  8. If you smoke and have diabetes, it is very important that you stop. Smoking reduces blood flow to your foot.  9. Avoid activities that increase your risk of foot injury:  · Do not walk barefoot.  · Do not use heating pads or  hot water bottles on your feet.  · Do not put your foot in a hot tub without first checking the temperature with your hand.  10) Schedule yearly foot exams.    Follow Up  with your doctor or as advised by our staff. Report any cut, puncture, scrape, other injury, blister, ingrown toenail or ulcer on your foot.    Get Prompt Medical Attention  if any of the following occur:  -- Open ulcer with pus draining from the wound  -- Increasing foot or leg pain  -- New areas of redness or swelling or tender areas of the foot    © 3822-7034 [employer]. [address] [place] [address]. All rights reserved. This information is not intended as a substitute for professional medical care. Always follow your healthcare professional's instructions.

## 2017-07-07 DIAGNOSIS — G89.29 BILATERAL CHRONIC KNEE PAIN: Primary | ICD-10-CM

## 2017-07-07 DIAGNOSIS — M25.561 BILATERAL CHRONIC KNEE PAIN: Primary | ICD-10-CM

## 2017-07-07 DIAGNOSIS — M25.562 BILATERAL CHRONIC KNEE PAIN: Primary | ICD-10-CM

## 2017-07-07 RX ORDER — OXYCODONE AND ACETAMINOPHEN 10; 325 MG/1; MG/1
1 TABLET ORAL EVERY 6 HOURS PRN
Qty: 120 TABLET | Refills: 0 | Status: SHIPPED | OUTPATIENT
Start: 2017-07-07 | End: 2017-08-07 | Stop reason: SDUPTHER

## 2017-07-07 NOTE — TELEPHONE ENCOUNTER
----- Message from Antonella Mueller sent at 7/7/2017  4:12 PM CDT -----  _  1st Request  _  2nd Request  _  3rd Request    Please refill the medication(s) listed below. Please call the patient when the prescription(s) is ready for  at the phone number [phone]    Medication #1    oxycodone-acetaminophen (PERCOCET)  mg per tablet 120 tablet 0 6/9/2017 7/9/2017 No  Sig - Route: Take 1 tablet by mouth every 6 (six) hours as needed for Pain. - Oral  Class: Normal        Preferred Pharmacy:

## 2017-07-11 RX ORDER — INSULIN LISPRO 100 [IU]/ML
INJECTION, SOLUTION INTRAVENOUS; SUBCUTANEOUS
Qty: 10 ML | Refills: 6 | Status: SHIPPED | OUTPATIENT
Start: 2017-07-11 | End: 2017-12-18 | Stop reason: SDUPTHER

## 2017-07-12 NOTE — TELEPHONE ENCOUNTER
----- Message from Shameka Zurita MA sent at 7/12/2017  8:39 AM CDT -----  Contact: Gabriella mahajan/Bwf-Glyf-[phone]  Please advise Pharmacy is calling in regards to the Medication HUMALOG 100 unit/mL injection. Please call. Thanks!

## 2017-08-07 ENCOUNTER — TELEPHONE (OUTPATIENT)
Dept: INTERNAL MEDICINE | Facility: CLINIC | Age: 52
End: 2017-08-07

## 2017-08-07 ENCOUNTER — OFFICE VISIT (OUTPATIENT)
Dept: INTERNAL MEDICINE | Facility: CLINIC | Age: 52
End: 2017-08-07
Payer: MEDICARE

## 2017-08-07 ENCOUNTER — LAB VISIT (OUTPATIENT)
Dept: LAB | Facility: HOSPITAL | Age: 52
End: 2017-08-07
Attending: INTERNAL MEDICINE
Payer: MEDICARE

## 2017-08-07 VITALS
HEART RATE: 91 BPM | HEIGHT: 65 IN | WEIGHT: 293 LBS | DIASTOLIC BLOOD PRESSURE: 88 MMHG | SYSTOLIC BLOOD PRESSURE: 141 MMHG | BODY MASS INDEX: 48.82 KG/M2

## 2017-08-07 DIAGNOSIS — E11.8 UNCONTROLLED TYPE 2 DIABETES MELLITUS WITH COMPLICATION, UNSPECIFIED LONG TERM INSULIN USE STATUS: ICD-10-CM

## 2017-08-07 DIAGNOSIS — N93.8 DUB (DYSFUNCTIONAL UTERINE BLEEDING): ICD-10-CM

## 2017-08-07 DIAGNOSIS — F33.2 SEVERE EPISODE OF RECURRENT MAJOR DEPRESSIVE DISORDER, WITHOUT PSYCHOTIC FEATURES: ICD-10-CM

## 2017-08-07 DIAGNOSIS — E11.65 UNCONTROLLED TYPE 2 DIABETES MELLITUS WITH COMPLICATION, UNSPECIFIED LONG TERM INSULIN USE STATUS: ICD-10-CM

## 2017-08-07 DIAGNOSIS — G89.29 BILATERAL CHRONIC KNEE PAIN: ICD-10-CM

## 2017-08-07 DIAGNOSIS — M25.561 KNEE PAIN, BILATERAL: ICD-10-CM

## 2017-08-07 DIAGNOSIS — D64.9 ANEMIA, UNSPECIFIED TYPE: ICD-10-CM

## 2017-08-07 DIAGNOSIS — M25.561 BILATERAL CHRONIC KNEE PAIN: ICD-10-CM

## 2017-08-07 DIAGNOSIS — J45.20 MILD INTERMITTENT ASTHMA WITHOUT COMPLICATION: ICD-10-CM

## 2017-08-07 DIAGNOSIS — M25.562 KNEE PAIN, BILATERAL: ICD-10-CM

## 2017-08-07 DIAGNOSIS — M25.562 BILATERAL CHRONIC KNEE PAIN: ICD-10-CM

## 2017-08-07 DIAGNOSIS — E78.5 HYPERLIPIDEMIA, UNSPECIFIED HYPERLIPIDEMIA TYPE: Primary | ICD-10-CM

## 2017-08-07 DIAGNOSIS — E66.9 OBESITY, UNSPECIFIED OBESITY SEVERITY, UNSPECIFIED OBESITY TYPE: ICD-10-CM

## 2017-08-07 LAB
ALBUMIN SERPL BCP-MCNC: 3.5 G/DL
ALP SERPL-CCNC: 61 U/L
ALT SERPL W/O P-5'-P-CCNC: 14 U/L
ANION GAP SERPL CALC-SCNC: 8 MMOL/L
AST SERPL-CCNC: 13 U/L
BASOPHILS # BLD AUTO: 0.03 K/UL
BASOPHILS NFR BLD: 0.4 %
BILIRUB SERPL-MCNC: 0.5 MG/DL
BUN SERPL-MCNC: 8 MG/DL
CALCIUM SERPL-MCNC: 9.3 MG/DL
CHLORIDE SERPL-SCNC: 104 MMOL/L
CO2 SERPL-SCNC: 26 MMOL/L
CREAT SERPL-MCNC: 0.7 MG/DL
DIFFERENTIAL METHOD: ABNORMAL
EOSINOPHIL # BLD AUTO: 0.2 K/UL
EOSINOPHIL NFR BLD: 2.8 %
ERYTHROCYTE [DISTWIDTH] IN BLOOD BY AUTOMATED COUNT: 15 %
EST. GFR  (AFRICAN AMERICAN): >60 ML/MIN/1.73 M^2
EST. GFR  (NON AFRICAN AMERICAN): >60 ML/MIN/1.73 M^2
ESTIMATED AVG GLUCOSE: 220 MG/DL
FERRITIN SERPL-MCNC: 11 NG/ML
GLUCOSE SERPL-MCNC: 228 MG/DL
HBA1C MFR BLD HPLC: 9.3 %
HCT VFR BLD AUTO: 33.6 %
HGB BLD-MCNC: 10.5 G/DL
IRON SERPL-MCNC: 29 UG/DL
LYMPHOCYTES # BLD AUTO: 2.3 K/UL
LYMPHOCYTES NFR BLD: 32.9 %
MCH RBC QN AUTO: 25.4 PG
MCHC RBC AUTO-ENTMCNC: 31.3 G/DL
MCV RBC AUTO: 81 FL
MONOCYTES # BLD AUTO: 0.5 K/UL
MONOCYTES NFR BLD: 7.4 %
NEUTROPHILS # BLD AUTO: 3.9 K/UL
NEUTROPHILS NFR BLD: 56.4 %
PLATELET # BLD AUTO: 391 K/UL
PMV BLD AUTO: 10.9 FL
POTASSIUM SERPL-SCNC: 4.5 MMOL/L
PROT SERPL-MCNC: 7.8 G/DL
RBC # BLD AUTO: 4.13 M/UL
SATURATED IRON: 6 %
SODIUM SERPL-SCNC: 138 MMOL/L
TOTAL IRON BINDING CAPACITY: 468 UG/DL
TRANSFERRIN SERPL-MCNC: 316 MG/DL
WBC # BLD AUTO: 6.87 K/UL

## 2017-08-07 PROCEDURE — 36415 COLL VENOUS BLD VENIPUNCTURE: CPT | Mod: PO

## 2017-08-07 PROCEDURE — 99999 PR PBB SHADOW E&M-EST. PATIENT-LVL V: CPT | Mod: PBBFAC,,, | Performed by: INTERNAL MEDICINE

## 2017-08-07 PROCEDURE — 99214 OFFICE O/P EST MOD 30 MIN: CPT | Mod: S$PBB,,, | Performed by: INTERNAL MEDICINE

## 2017-08-07 PROCEDURE — 83036 HEMOGLOBIN GLYCOSYLATED A1C: CPT

## 2017-08-07 PROCEDURE — 3079F DIAST BP 80-89 MM HG: CPT | Mod: ,,, | Performed by: INTERNAL MEDICINE

## 2017-08-07 PROCEDURE — 4010F ACE/ARB THERAPY RXD/TAKEN: CPT | Mod: ,,, | Performed by: INTERNAL MEDICINE

## 2017-08-07 PROCEDURE — 80053 COMPREHEN METABOLIC PANEL: CPT

## 2017-08-07 PROCEDURE — 82728 ASSAY OF FERRITIN: CPT

## 2017-08-07 PROCEDURE — 83540 ASSAY OF IRON: CPT

## 2017-08-07 PROCEDURE — 99215 OFFICE O/P EST HI 40 MIN: CPT | Mod: PBBFAC,PO | Performed by: INTERNAL MEDICINE

## 2017-08-07 PROCEDURE — 3077F SYST BP >= 140 MM HG: CPT | Mod: ,,, | Performed by: INTERNAL MEDICINE

## 2017-08-07 PROCEDURE — 3046F HEMOGLOBIN A1C LEVEL >9.0%: CPT | Mod: ,,, | Performed by: INTERNAL MEDICINE

## 2017-08-07 PROCEDURE — 3008F BODY MASS INDEX DOCD: CPT | Mod: ,,, | Performed by: INTERNAL MEDICINE

## 2017-08-07 PROCEDURE — 85025 COMPLETE CBC W/AUTO DIFF WBC: CPT

## 2017-08-07 RX ORDER — FLUOXETINE HYDROCHLORIDE 20 MG/1
20 CAPSULE ORAL DAILY
Qty: 30 CAPSULE | Refills: 6 | Status: SHIPPED | OUTPATIENT
Start: 2017-08-07 | End: 2017-12-18 | Stop reason: SDUPTHER

## 2017-08-07 RX ORDER — CELECOXIB 200 MG/1
CAPSULE ORAL
Qty: 30 CAPSULE | Refills: 2 | Status: SHIPPED | OUTPATIENT
Start: 2017-08-07 | End: 2017-12-18 | Stop reason: SDUPTHER

## 2017-08-07 RX ORDER — OXYCODONE AND ACETAMINOPHEN 10; 325 MG/1; MG/1
1 TABLET ORAL EVERY 6 HOURS PRN
Qty: 120 TABLET | Refills: 0 | Status: SHIPPED | OUTPATIENT
Start: 2017-08-07 | End: 2017-08-25 | Stop reason: SDUPTHER

## 2017-08-07 NOTE — TELEPHONE ENCOUNTER
----- Message from Desi Beverly sent at 8/7/2017  3:07 PM CDT -----  _  1st Request  _  2nd Request  _  3rd Request    Please refill the medication(s) listed below. Please call the patient when the prescription(s) is ready for  at the phone number ([phone]_  Medication #1oxycodone-acetaminophen (PERCOCET)  mg per tablet    Medication #2      Preferred Pharmacy:[facility] [address]

## 2017-08-08 NOTE — TELEPHONE ENCOUNTER
----- Message from Dana Saldana sent at 8/8/2017  9:43 AM CDT -----  Contact: self/[phone]  Patient called in regards needing to inform Dr Richardson that the rx for the Prozac is not [facility]. Waiting for  to sent it. Please call and advise.       Thank you!!!

## 2017-08-12 NOTE — PROGRESS NOTES
Subjective:       Patient ID: Alivia Thomas is a 51 y.o. female.    Chief Complaint: Follow-up    HPIPt is feeling depressed as her brother and father are sick.  She feels overwhelmed.  No CP or SOB.  Not taking her meds as she should.  She is not suicidal or homicidal.  Review of Systems   Respiratory: Negative for shortness of breath (PND or orthopnea).    Cardiovascular: Negative for chest pain (arm pain or jaw pain).   Gastrointestinal: Negative for abdominal pain, diarrhea, nausea and vomiting.   Genitourinary: Negative for dysuria.   Neurological: Negative for seizures, syncope and headaches.   Psychiatric/Behavioral: Positive for decreased concentration and dysphoric mood.       Objective:      Physical Exam   Constitutional: She is oriented to person, place, and time. She appears well-developed and well-nourished. No distress.   HENT:   Head: Normocephalic.   Mouth/Throat: Oropharynx is clear and moist.   Neck: Neck supple. No JVD present. No thyromegaly present.   Cardiovascular: Normal rate, regular rhythm, normal heart sounds and intact distal pulses.  Exam reveals no gallop and no friction rub.    No murmur heard.  Pulmonary/Chest: Effort normal and breath sounds normal. She has no wheezes. She has no rales.   Abdominal: Soft. Bowel sounds are normal. She exhibits no distension and no mass. There is no tenderness. There is no rebound and no guarding.   Musculoskeletal: She exhibits no edema.   Lymphadenopathy:     She has no cervical adenopathy.   Neurological: She is alert and oriented to person, place, and time.   Skin: Skin is warm and dry.   Psychiatric: She has a normal mood and affect. Her behavior is normal. Judgment and thought content normal.       Assessment:       1. Hyperlipidemia, unspecified hyperlipidemia type    2. Mild intermittent asthma without complication    3. Uncontrolled type 2 diabetes mellitus with complication, unspecified long term insulin use status    4. Severe episode  of recurrent major depressive disorder, without psychotic features    5. Anemia, unspecified type    6. DUB (dysfunctional uterine bleeding)    7. Obesity, unspecified obesity severity, unspecified obesity type        Plan:   Hyperlipidemia, unspecified hyperlipidemia type  Controlled - continue current meds    Mild intermittent asthma without complication  Controlled - continue current meds    Uncontrolled type 2 diabetes mellitus with complication, unspecified long term insulin use status  -     Cancel: Hemoglobin A1c; Future; Expected date: 08/07/2017  -     Comprehensive metabolic panel; Future; Expected date: 08/07/2017  -     Hemoglobin A1c; Future; Expected date: 08/07/2017    Severe episode of recurrent major depressive disorder, without psychotic features  -     Ambulatory consult to Psychiatry  Start fluoxetine  Anemia, unspecified type  -     CBC auto differential; Future; Expected date: 08/07/2017  -     Iron and TIBC; Future; Expected date: 08/07/2017  -     Ferritin; Future; Expected date: 08/07/2017    DUB (dysfunctional uterine bleeding)  -     Ambulatory consult to Gynecology    Obesity, unspecified obesity severity, unspecified obesity type  -     Ambulatory consult to Bariatric Medicine    Other orders  -     fluoxetine (PROZAC) 20 MG capsule; Take 1 capsule (20 mg total) by mouth once daily.  Dispense: 30 capsule; Refill: 6

## 2017-08-25 ENCOUNTER — OFFICE VISIT (OUTPATIENT)
Dept: PAIN MEDICINE | Facility: CLINIC | Age: 52
End: 2017-08-25
Payer: MEDICARE

## 2017-08-25 VITALS
HEART RATE: 74 BPM | SYSTOLIC BLOOD PRESSURE: 136 MMHG | BODY MASS INDEX: 48.82 KG/M2 | RESPIRATION RATE: 18 BRPM | DIASTOLIC BLOOD PRESSURE: 81 MMHG | TEMPERATURE: 98 F | WEIGHT: 293 LBS | HEIGHT: 65 IN

## 2017-08-25 DIAGNOSIS — M47.816 FACET ARTHRITIS OF LUMBAR REGION: ICD-10-CM

## 2017-08-25 DIAGNOSIS — Z96.653 S/P TOTAL KNEE REPLACEMENT, BILATERAL: ICD-10-CM

## 2017-08-25 DIAGNOSIS — Z51.81 ENCOUNTER FOR MONITORING OPIOID MAINTENANCE THERAPY: ICD-10-CM

## 2017-08-25 DIAGNOSIS — M25.561 BILATERAL CHRONIC KNEE PAIN: Primary | ICD-10-CM

## 2017-08-25 DIAGNOSIS — Z79.891 ENCOUNTER FOR MONITORING OPIOID MAINTENANCE THERAPY: ICD-10-CM

## 2017-08-25 DIAGNOSIS — G89.29 BILATERAL CHRONIC KNEE PAIN: Primary | ICD-10-CM

## 2017-08-25 DIAGNOSIS — M25.562 BILATERAL CHRONIC KNEE PAIN: Primary | ICD-10-CM

## 2017-08-25 DIAGNOSIS — G89.4 CHRONIC PAIN DISORDER: ICD-10-CM

## 2017-08-25 LAB
AMP D-AMPHETAMINE 1000 NG/ML: NEGATIVE
BAR SECOBARBITAL 300 NG/ML: NEGATIVE
BUP BUPRENORPHINE 10 NG/ML: NEGATIVE
BZO OXAZEPAM 300 NG/ML: NEGATIVE
COC BENZOYLECGONINE 300 NG/ML: NEGATIVE
MAMP D-METHAMPHETAMINE 1000 NG/ML: NEGATIVE
MOP MORPHINE 300 NG/ML: NEGATIVE
MTD METHADONE 300 NG/ML: NEGATIVE
QXY OXYCODONE 100 NG/ML: POSITIVE
THC 11-NOR-9-TETRAHYDROCANNABINOL-9-CARBOXYLIC ACID: NEGATIVE

## 2017-08-25 PROCEDURE — 80307 DRUG TEST PRSMV CHEM ANLYZR: CPT

## 2017-08-25 PROCEDURE — 99215 OFFICE O/P EST HI 40 MIN: CPT | Mod: PBBFAC | Performed by: NURSE PRACTITIONER

## 2017-08-25 PROCEDURE — 80305 DRUG TEST PRSMV DIR OPT OBS: CPT | Mod: PBBFAC | Performed by: NURSE PRACTITIONER

## 2017-08-25 PROCEDURE — 3075F SYST BP GE 130 - 139MM HG: CPT | Mod: ,,, | Performed by: NURSE PRACTITIONER

## 2017-08-25 PROCEDURE — 3079F DIAST BP 80-89 MM HG: CPT | Mod: ,,, | Performed by: NURSE PRACTITIONER

## 2017-08-25 PROCEDURE — 99999 PR PBB SHADOW E&M-EST. PATIENT-LVL V: CPT | Mod: PBBFAC,,, | Performed by: NURSE PRACTITIONER

## 2017-08-25 PROCEDURE — 99213 OFFICE O/P EST LOW 20 MIN: CPT | Mod: S$PBB,,, | Performed by: NURSE PRACTITIONER

## 2017-08-25 RX ORDER — OXYCODONE AND ACETAMINOPHEN 10; 325 MG/1; MG/1
1 TABLET ORAL EVERY 6 HOURS PRN
Qty: 120 TABLET | Refills: 0 | Status: SHIPPED | OUTPATIENT
Start: 2017-08-25 | End: 2017-09-24

## 2017-08-25 NOTE — PROGRESS NOTES
Subjective:      Patient ID: Alivia Thomas is a 51 y.o. female.    Chief Complaint: Knee Pain    Referred by: No ref. provider found     Interval History 8/25/2017:  The patient returns to clinic today for follow up. She continues to report bilateral knee pain. She continues to take care of her elderly ill father. She also reports that her brother has been ill and hospitalized. She continues to take Percocet as needed for pain with benefit. She denies any adverse effects. She continues to exercise and diet. Her pain today is 7/10.    Interval History 5/25/17:   The patient returns today for follow up. She is s/p left L2,3,4,5 cooled RFA on 4/26/17 and right L2,3,4,5 cooled RFA on 5/9/17. She reports 80% relief of her back pain. She continues to report bilateral knee pain that is worse with prolonged walking. She reports that she is exercising 5 days a week. She continues to take care of her elderly father. She continues to take Percocet as needed for pain with relief. She denies any other health changes. She denies any bowel or bladder incontinence. Her pain today is 4/10.     Interval History 4/11/2017:  The patient returns today for follow up and medication refill.  She has increased her dieting and exercise for weight loss.  She has lost 14 lbs since her last visit.  She is very excited about this.  She is walking 6 days per week.  She also stays active taking her of her elderly father.  She has given up meat for lent.  She continues to follow up with bariatrics.  Her biggest complaint today is lower back pain.  She previously had benefit with cooled lumbar RFAs and would like to schedule repeats.  Her pain is worse with prolonged standing and bending.  She is taking Percocet as needed for pain without adverse effects.  Her pain today is 6/10.  The patient denies any bowel or bladder incontinence or signs of saddle paresthesia.  The patient denies any major medical changes since last office visit.    Interval  History 3/14/2017:  The patient returns today for follow up of back and knee pain.  She continues with measures for weight loss.  She is still planning on bariatric surgery but would like to lose weight on her own prior to this.  She has lost about 4 lbs since her visit with me last month.  She continues to take Percocet which helps her pain without adverse effects.  Her pain today is 8/10.  The patient denies any bowel or bladder incontinence or signs of saddle paresthesia.  The patient denies any major medical changes since last office visit.    Interval History 2/15/2017:  The patient returns today for follow up and medication refill.  She is still planning on having weight loss surgery in the future.  She admits that she has not been as active as previously.  She has a follow up with Dr. Swan scheduled next month.  She continues to take Percocet with benefit.  Her pain today is 8/10.      Interval History 1/18/2017:  The patient returns for follow up and medication refill.  She reports no major changes in her back and knee pain since her last visit.  She has had a lot going on with health issues of family members.  She takes care of her father and her brother, who were both recently hospitalized.  She still plans on having weight loss surgery in the future.  Her pain today is.  She is taking Percocet with benefit and without side effects at this time.    Interval History 12/15/2016:  The patient returns today for follow up of lower back and bilateral knee pain.  She continues to report relief from cooled lumbar RFAs in October.  She feels as though the colder weather is causing increased knee pain.  Since her last visit, she has decided to have weight loss surgery.  She was evaluated by bariatrics and is undergoing pre-op workup at this time.  Her pain today is 8/10.  She continue to take Percocet with significant benefit.      Interval History 11/3/2016:  The patient returns today for follow up of back  pain.  She is s/p left then right L2,3,4,5 cooled RFA completed on 10/19/16 with 80% pain relief.  She is very satisfied with these results.  She continues to take Percocet with relief.  She has started kick boxing classes and continues to lose weight.  She has noticeable weight loss since her last visit and is very happy about this.  Her pain today is 8/10.    Interval History 9/16/2016:  The patient returns today with complaints of lower back and knee pain.  Her worst pain is in her lower back without radiation.  She has lost weight since her last weight.  She has increased her exercise which is helping.  She continues with her diet plan.  She is having the pool at her home fixed and plans to use this for exercise, as she has benefited from frequent pool therapy at [facility].  She previously had significant relief with lumbar RFAs in April for about 4 months.  She would like to repeat the procedures.  She continues to take Percocet as needed which provides her relief.  Her pain today is 8/10.  The patient denies any bowel or bladder incontinence or signs of saddle paresthesia.      Interval History 8/19/2016:  The patient returns today for follow up and medication refill.  She complains of back and knee pain.  Her back pain does not radiate.  She did have relief with RFA in April but feels the pain is returning.  Her previous UTI has resolved.  She has been unable to return to her aquatic therapy because she is caring for her father.  She plans to start walking in the morning before her father wakes up.  She has gained a few pounds since her last visit and is upset about this, as she was previously losing at each visit.  She is also trying to control her diet.  She continues to take Percocet with significant pain relief.  Her pain today is 8/10.  The patient denies any bowel or bladder incontinence or signs of saddle paresthesia.  The patient denies any major medical changes since last office  visit.    Interval History 7/19/2016:  The patient returns today for follow up.  She has a history of lower back and bilateral knee pain.  Since her last visit, she reports being diagnosed with a UTI and is currently on antibiotics. She has still been unable to return to aquatherapy.  She has been performing a home exercise routine.  She has lost 6 lbs since her visit last month.  She is taking Percocet as needed for pain.  She reports efficacy without adverse effects.  Her pain today is 7/10.      Interval History 6/20/2016:  Patient returns for follow up and medication refill.  She has a history of lower back and bilateral knee pain.  Since her last encounter, she reports that she has been suffering with an upper respiratory infection.  She saw Dr. Richardson last week and was started on oral Augmentin and antibiotic eye drops.  She reports that whenever she is sick, she suffers with swelling and drainage to her left eye.  She states that her symptoms have improved since starting the eye drops.  She has been unable to participate in her daily pool therapy since she has been sick but is anxious to resume once she is feeling better.  She did have recent labwork which showed an improving A1C with cholesterol and triglycerides WNL.  She is proud of herself about this, as she reports be very good with her diet recently.  Her pain today is an 8/10.    Interval History 5/5/2016:  Patient returns today for procedure follow up.  She is s/p left then right L2,3,4,5 RFA completed on 4/20/16 with 70% pain relief so far.  She reports lower back and bilateral knee pain.  She has had genicular nerve blocks in the past which provided significant relief for her left knee pain and limited relief of her right knee pain.  She is currently doing physical therapy per self.  She is doing 45 minutes of pool therapy five days per week.  She thinks that this is helping with her pain and mobility.  She is trying to lose weight because she  is aware that this will help with her pain.  She is taking percocet as needed which provided relief without side effects.  Her pain today is a 5/10.      Interval History: 3/28/2016:  Patient returns today for follow up of lower back and bilateral knee pain.  She is s/p Bilateral L2,3,4,5 MBB on 2/16/16 with 80% relief for 5 days and bilateral L2,3,4,5 MBB on 3/1/16 with 70% pain relief for 1 day.  She is requesting to schedule the RFAs.  The worst of her pain is located to her left lower back and does not radiate. She is still complaining of bilateral knee pain which is worse with walking and activity.  Her pain today is a 9/10.  The patient denies any bowel/bladder incontinence or symptoms of saddle paresthesia.  The patient denies any major medical changes since last OV. She is currently taking Percocet which helps her pain without any adverse effects.     Interval History: 12/17/2015:  Patient presents in clinic for follow up for lower back, bilateral knee, and right arm pain. Her pain is 9/10 today. She underwent carpal tunnel revision 12/14/15 in the right wrist. She is currently out of her Percocet 10-325mg prescription.   Cont to have significant low back pain, wh will also ich she reports is her worst current pain.  Based on previous imaging we know that the patient has significant facet arthropathy in the lumbar spine at the levels of L3-4 and L4-5 L5-S1.  She describes dull achy with occasional sharp pain rates as 7/10.     Interval history 10/26/2015:  Patient returns to clinic for follow up previously seen for knee pain and low back pain. She reports pain is improved with Percocet 10/325 BID. She is no longer taking Lyrica and recently started Topamax. She continues to take Celebrex once per day and does help. She also continues to use a topical cream PRN and does help some. She has completed therapy since last visit but she is continuing to exercise regularly. Her pain in back and knees is unchanged in  quality and distribution from previous visits. She otherwise denies any new issues at this time.     Interval history 9/21/2015:  Since previous encounter the patient comes in after having started using gabapentin and developing swelling in her legs.  She states that it did make a difference for her pain although she discontinued it secondary to swelling after a decrease in her dosing did not alleviate this.  She followed up with her orthopedist and did have x-rays performed which did not show any significant change compared to previous.  She is scheduled for a four-month follow-up.  She has not yet begun exercising but will begin soon she is scheduled for pool-based therapy.  The opioid medications have been helping her and her pain twice a day.  Further decrease to once a day prevented her from being able to function.  She does continue to take Celebrex without adverse reaction.  Additionally the patient stated that she has been having lower back pain which is new.    Interval History 08-: Since previous visit patient comes in today to discuss her medications.  Patient states she is having pain in the lower back and both knee, sharp , throbbing , burning, and stabbing pain, she rates it 8/10.  Patient is taking percocet 10/325mg, we have been weaning her from this medication last prescription was provided for 30 tablets.  Additionally the patient is taking Celebrex with regularity with some improvement in her pain.  She has completed physical therapy status post knee replacement her knee hardware appears appropriate she has a scheduled appointment to follow-up with her orthopedic surgeon in one week to discuss using a extension brace while she is at home laying in bed.  She continues to swim twice a week and is actively trying to lose weight and has been dieting.    Interval History 06/19/2015:  Patient presents in clinic for two month follow up. She reports her bilateral knee pain and low back pain is an  8/10. She currently takes celebrex and Percocet for pain and uses a topical cream.  She was recently evaluated by her orthopedist and the hardware all appears to be appropriately placed and the next follow-up is in 6 weeks.  The patient continues to work on weight loss and exercise and states that she has been doing more than in the past.   Patient reports no other health changes since previous encounter.    Interval History 04/09/2015:  Patient presents in clinic for one month follow up. She reports bilateral knee pain and low back pain is a 9/10 today. She currently takes percocet for pain as needed and uses a cane for ambulation. She states that the low back pain is new.  She continues to have bilateral knee pain and is in physical therapy.  She continues to take Celebrex daily and uses a topical pain cream regularly.    Patient reports no other health changes since previous encounter.    Interval history 3/5/2015:  Since previous encounter patient is status post right total knee replacement on 11/4/2014 and has been healed with postoperative visits showing good progress.  The patient does have continued physical therapy sessions and has been attempting to lose weight and has lost approximately 25 pounds although her BMI continues to be 54.  She has been making good efforts to try and continue to increase her range of motion and lose weight.  She continues to have significant pain in bilateral knees and continues to use a cane for ambulation.  She was receiving oxycodone/acetaminophen 10/325 every 8 hours by mouth when necessary and requiring in order to persist in her physical therapy although the topical pain cream that she has been applying has been helping her to a limited degree she still requires the medication regularly.  Her recent x-ray imaging shows good positioning of the prosthesis.  No other health changes since previous encounter.     Interval history 2/17/2014:  Patient reports that she has been  using the topical compounded cream on the knee approximately 4 times per day and she states that it does help her with her pain that she is experiencing.  She states that she has not gone to formal physical therapy but that she has been going to a pool that has exercise classes which is free for her and that she feels like it is helping her continue to lose weight.  Additionally she continues using hydrocodone/acetaminophen 7.5/750 approximately 3 times per day when necessary and states that also helps with her pain symptoms.  He she's still talks to have replacement for the left knee, but would like to lose weight further before going to that.  She has also had previous injections into her knees which have offered little to no relief in her pain symptoms.  She has had no other health changes since previous encounter.    Previous encounter 1/21/2014:  HPI Comments: 47 yo female presents for initial evaluation of bilateral knee pain, L>R. She is s/p arthroscopic right knee surgery and eventual replacement and then revision surgeries (Dr. Swan, Orthopedics). The pain is present in both knees and is described as a terrible ache. She hears occasional popping in both knees with movement. The pain is worse with being on her feet and getting up from a sitting to standing position. Denies lower ext weakness or paresthesias. She does not have back pain or pain radiating down her legs. The pain is better with Celebrex and rest. She also takes Vicodin ES TID but this makes her very sleepy. She is not sure it helps with the pain because she usually falls asleep.     Physical Therapy: not since 2011 just prior to her 3rd right knee surgery (revision after replacement); has tried swimming which helps with weight loss    Non-pharmacologic Treatment: none    Pain Medications: Percocet and celebrex    Blood thinners: ASA 81 mg daily     Interventional Therapies: steroid inj and visco-supplementation (series of 3) in left knee-  not helpful  3/31/14 Bilateral genicular nerve block- significant relief of left knee, limited relief of right knee pain  2/16/16 Bilateral L2,3,4,5 MBB  3/1/16 Bilateral L2,3,4,5 MBB   4/6/16 Left L2,3,4,5 RFA- significant relief  4/20/16 Right L2,3,4,5 RFA- significant relief  10/5/16 Left L2,3,4,5 cooled RFA  10/19/16 Right L2,3,4,5 cooled RFA  4/26/17 Left L2,3,4,5 cooled RFA  5/9/17 Right L2,3,4,5 cooled RFA      Relevant Surgeries: right knee surgery x3 (arthroscopic, then replacement and subsequent revision surgery), s/p left total knee arthroplasty    Relevant Imaging:  Xray Lumbar spine 09/21/2015  Lumbar spine radiograph    Comparison: None    Results: AP, lateral neutral, lateral flexion , lateral extension, bilateral oblique and spot views. The alignment of the lumbar spine demonstrates a mild levoscoliosis . 11-mm anterior listhesis of L4 relative to L5 with no translational abnormalities  seen on flexion and extension views. The vertebral body heights are well-maintained , mild disk space narrowing L4-L5 and L5-S1. Mild anterior and marginal osteophyte formation seen throughout the lumbar spine . The oblique views demonstrate no   definite spondylolysis. There is facet joint osseous hypertrophy noted at L3-L4 and L4-L5.      Impression         The Significant spondylosis of the lumbar spine with grade 1 anterior listhesis L4 relative to L5.  Facet joint osseous hypertrophy L3-L4 and L4-5.      Electronically signed by: BLESSING ROE MD  Date: 09/21/15  Time: 09:56           knee x-rays (see below) x-ray knee bilateral 8/26/2015:  Standing AP knees, lateral view of both knees and sunrise view of both patella. Study compared to May 2015. Postop change of bilateral knee replacement. The prosthetic components are in satisfactory position. Erosive changes involving the patella   again evident bilaterally.    Impression no significant change.      Xray Bilateral Knee 05/25/2015:  There are bilateral  total knee arthroplasties with posterior resurfacing of the patella. As observed on 12/17/2014 there is a fracture of the left patella in the parasagittal plane.    Heterotopic bone is evident about each knee.    I detect no dislocation, unusual radiopaque retained foreign body, lytic or blastic lesion, or chondrocalcinosis.    Lab Results   Component Value Date    HGBA1C 9.3 (H) 08/07/2017     Lab Results   Component Value Date    CHOL 191 05/09/2017    CHOL 200 (H) 12/15/2016    CHOL 153 06/17/2016     Lab Results   Component Value Date    HDL 48 05/09/2017    HDL 40 12/15/2016    HDL 44 06/17/2016     Lab Results   Component Value Date    LDLCALC 129.0 05/09/2017    LDLCALC 141.0 12/15/2016    LDLCALC 92.6 06/17/2016     Lab Results   Component Value Date    TRIG 70 05/09/2017    TRIG 95 12/15/2016    TRIG 82 06/17/2016     Lab Results   Component Value Date    CHOLHDL 25.1 05/09/2017    CHOLHDL 20.0 12/15/2016    CHOLHDL 28.8 06/17/2016         Past Medical History:   Diagnosis Date    Asthma     Diabetes mellitus type II     DJD (degenerative joint disease) of knee 6/19/2014    Hyperlipidemia     Morbid obesity     Sleep apnea      Past Surgical History:   Procedure Laterality Date    CARPAL TUNNEL RELEASE      CARPAL TUNNEL RELEASE  1980s    left    CARPAL TUNNEL RELEASE  2012    right    JOINT REPLACEMENT Bilateral     with 2 revisions on rt    KNEE SURGERY  3/2010    orthroscope    KNEE SURGERY  6-19-14    left TKR     Family History   Problem Relation Age of Onset    Diabetes Mother     Hypertension Mother     Cataracts Mother     Diabetes Father     Cataracts Father     Coronary artery disease Brother     Amblyopia Neg Hx     Blindness Neg Hx     Cancer Neg Hx     Glaucoma Neg Hx     Macular degeneration Neg Hx     Retinal detachment Neg Hx     Strabismus Neg Hx     Stroke Neg Hx     Thyroid disease Neg Hx      Social History     Social History    Marital status: Single      Spouse name: N/A    Number of children: N/A    Years of education: N/A     Occupational History    Not on file.     Social History Main Topics    Smoking status: Never Smoker    Smokeless tobacco: Never Used    Alcohol use Yes      Comment: occasionally     Drug use: No    Sexual activity: Yes     Partners: Female     Birth control/ protection: None     Other Topics Concern    Not on file     Social History Narrative    Disabled. The patient is the youngest of 6 siblings. Single. Lives with single-sex partner.                      Review of patient's allergies indicates:  No Known Allergies    Current Outpatient Prescriptions:     albuterol 90 mcg/actuation inhaler, Take 2 puffs every 4-6 hours  as needed for shortness of breath/wheezing, Disp: 1 Inhaler, Rfl: 6    aspirin (ECOTRIN) 81 MG EC tablet, Take 1 tablet by mouth every morning. prevents heart attacks and strokes, Disp: , Rfl:     atorvastatin (LIPITOR) 20 MG tablet, Take 1 tablet (20 mg total) by mouth once daily., Disp: 30 tablet, Rfl: 6    blood sugar diagnostic Strp, Freestyle light strips and lancets, Disp: 100 each, Rfl: 6    celecoxib (CELEBREX) 200 MG capsule, TAKE ONE CAPSULE BY MOUTH DAILY, Disp: 30 capsule, Rfl: 2    econazole nitrate 1 % cream, Apply topically 2 (two) times daily., Disp: 30 g, Rfl: 2    fluoxetine (PROZAC) 20 MG capsule, Take 1 capsule (20 mg total) by mouth once daily., Disp: 30 capsule, Rfl: 6    HUMALOG 100 unit/mL injection, INJECT 11 UNITS SUBCUTANEOUSLY THREE TIMES DAILY BEFORE MEAL(S) PLUS  SLIDING  SCALE, Disp: 10 mL, Rfl: 6    insulin detemir (LEVEMIR FLEXPEN) 100 unit/mL (3 mL) SubQ InPn pen, Inject 20 Units into the skin every evening., Disp: 1 Box, Rfl: 11    insulin lispro (HUMALOG KWIKPEN) 100 unit/mL InPn pen, 11 Units before meals plus sliding scale, Disp: 1 Box, Rfl: 6    insulin lispro (HUMALOG) 100 unit/mL injection, Inject 11 Units into the skin 3 (three) times daily before meals. Glucose         Intervention  (units to add in addition to meal time Humalog base dose of 11 Units)                       0-80    orange juice            0 units    151 - 200       +2 Units    201 - 250       +4 Units    251 - 300       +6 Units    301 - 350        +8 Units    351 - 400       +10 Units    > 400           Call MD, Disp: 3 mL, Rfl: 11    insulin lispro (HUMALOG) 100 unit/mL injection, 11 Units before meals plus sliding scale, Disp: 5 vial, Rfl: 6    lisinopril (PRINIVIL,ZESTRIL) 2.5 MG tablet, One daily for proteinuria., Disp: 30 tablet, Rfl: 6    metformin (GLUCOPHAGE-XR) 500 MG 24 hr tablet, Take 2 tablets (1,000 mg total) by mouth 2 (two) times daily., Disp: 360 tablet, Rfl: 6    omeprazole (PRILOSEC) 20 MG capsule, Take 1 capsule (20 mg total) by mouth every morning., Disp: 30 capsule, Rfl: 6    oxycodone-acetaminophen (PERCOCET)  mg per tablet, Take 1 tablet by mouth every 6 (six) hours as needed for Pain., Disp: 120 tablet, Rfl: 0    vitamin D 185 MG Tab, Take 5,000 mg by mouth once daily., Disp: , Rfl:     REVIEW OF SYSTEMS:    GENERAL:  Patient is actively losing weight.  RESPIRATORY:  Negative for cough, wheezing or shortness of breath, patient denies any recent URI.  CARDIOVASCULAR:  Negative for chest pain, leg swelling or palpitations.  GI:  Negative for abdominal discomfort, blood in stools or black stools or change in bowel habits, occasional constipation.  MUSCULOSKELETAL:  See HPI.  SKIN:  Negative for lesions, rash, and itching.  PSYCH:  No mood disorder or recent psychosocial stressors.  Patient's sleep is disturbed secondary to pain (patient reports also that she has insomnia).  HEMATOLOGY/LYMPHOLOGY:  Negative for prolonged bleeding, bruising easily or swollen nodes.  81 mg aspirin  ENDO: Patient has a history of diabetes  NEURO:   No history of headaches, syncope, paralysis, seizures or tremors.  All other reviewed and negative other than HPI.    OBJECTIVE:    /81    "Pulse 74   Temp 98.1 °F (36.7 °C) (Oral)   Resp 18   Ht 5' 5" (1.651 m)   Wt (!) 160 kg (352 lb 11.8 oz)   LMP 08/03/2017   BMI 58.70 kg/m²     PHYSICAL EXAMINATION:    GENERAL: Well appearing, in no acute distress, alert and oriented x3.   PSYCH:  Mood and affect appropriate.  SKIN: Skin color, texture, turgor normal, no rashes or lesions.  HEAD/FACE:  Normocephalic, atraumatic.   CV: RRR with palpation of the radial artery.  PULM: No evidence of respiratory difficulty, symmetric chest rise.  BACK: No pain to palpation over the lumbar facet joints.  Full ROM with mild pain on extension.  Positive bilateral facet loading. Negative SLR bilaterally.  Pain with palpation to bilateral SI joints.  JENNA is negative bilaterally.  EXTREMITIES:  Well-healed midline scars to bilateral knees.  Painful extension and flexion of bilateral knees.  Medial joint line tenderness to bilateral knees, left greater than right.     MUSCULOSKELETAL: Bilateral upper and lower extremity strength is normal and symmetric.  No atrophy or tone abnormalities are noted.  NEURO: Bilateral lower extremity coordination and muscle stretch reflexes are physiologic and symmetric.  Plantar response are downgoing. No clonus.  No loss of sensation is noted.  GAIT: Antalgic- ambulating without assistance.       Assessment:       Encounter Diagnoses   Name Primary?    Bilateral chronic knee pain Yes    S/p total knee replacement, bilateral     Facet arthritis of lumbar region     Chronic pain disorder     Encounter for monitoring opioid maintenance therapy          Plan:       - Previous imaging was reviewed and discussed with the patient today.     - Continue to f/u with bariatrics for possible gastric sleeve surgery.      - The patient will continue a home exercise routine to help with pain and strengthening.  She is walking and/or riding her bike daily.     - She is s/p bilateral lumbar cooled RFA with relief. We can repeat this in 6 months if " needed.     - Continue oxycodone-acetaminophen 10/325 one tablet every 6 hours PRN pain, #120, 0 refills. She may call for a refill.     - The [place] Board of Pharmacy website for prescription monitoring was consulted today, and it does not suggest any deviations in conflict with the patient's controlled substance contract with our clinic. Will continue current therapy with frequent monitoring of the controlled substance database, and urine drug screens on followup. Medication management provided by Dr. Wagner.     - UDS performed today.     - Continue topical pain cream PRN.    - RTC in 3 months or sooner if needed.     The above plan and management options were discussed at length with patient. Patient is in agreement with the above and verbalized understanding.     Adeola Robledo NP  08/25/2017

## 2017-08-29 LAB

## 2017-09-18 ENCOUNTER — OFFICE VISIT (OUTPATIENT)
Dept: INTERNAL MEDICINE | Facility: CLINIC | Age: 52
End: 2017-09-18
Payer: MEDICARE

## 2017-09-18 ENCOUNTER — TELEPHONE (OUTPATIENT)
Dept: INTERNAL MEDICINE | Facility: CLINIC | Age: 52
End: 2017-09-18

## 2017-09-18 ENCOUNTER — HOSPITAL ENCOUNTER (OUTPATIENT)
Dept: RADIOLOGY | Facility: HOSPITAL | Age: 52
Discharge: HOME | End: 2017-09-18
Attending: INTERNAL MEDICINE
Payer: MEDICARE

## 2017-09-18 VITALS
HEIGHT: 65 IN | DIASTOLIC BLOOD PRESSURE: 92 MMHG | HEART RATE: 75 BPM | WEIGHT: 293 LBS | BODY MASS INDEX: 48.82 KG/M2 | SYSTOLIC BLOOD PRESSURE: 135 MMHG

## 2017-09-18 DIAGNOSIS — J40 BRONCHITIS: ICD-10-CM

## 2017-09-18 DIAGNOSIS — J40 BRONCHITIS: Primary | ICD-10-CM

## 2017-09-18 PROCEDURE — 4010F ACE/ARB THERAPY RXD/TAKEN: CPT | Mod: ,,, | Performed by: INTERNAL MEDICINE

## 2017-09-18 PROCEDURE — 99999 PR PBB SHADOW E&M-EST. PATIENT-LVL V: CPT | Mod: PBBFAC,,, | Performed by: INTERNAL MEDICINE

## 2017-09-18 PROCEDURE — 71020 XR CHEST PA AND LATERAL: CPT | Mod: TC,PO

## 2017-09-18 PROCEDURE — 3080F DIAST BP >= 90 MM HG: CPT | Mod: ,,, | Performed by: INTERNAL MEDICINE

## 2017-09-18 PROCEDURE — 99213 OFFICE O/P EST LOW 20 MIN: CPT | Mod: S$PBB,,, | Performed by: INTERNAL MEDICINE

## 2017-09-18 PROCEDURE — 71020 XR CHEST PA AND LATERAL: CPT | Mod: 26,,, | Performed by: RADIOLOGY

## 2017-09-18 PROCEDURE — 3046F HEMOGLOBIN A1C LEVEL >9.0%: CPT | Mod: ,,, | Performed by: INTERNAL MEDICINE

## 2017-09-18 PROCEDURE — 99215 OFFICE O/P EST HI 40 MIN: CPT | Mod: PBBFAC,25,PO | Performed by: INTERNAL MEDICINE

## 2017-09-18 PROCEDURE — 3075F SYST BP GE 130 - 139MM HG: CPT | Mod: ,,, | Performed by: INTERNAL MEDICINE

## 2017-09-18 RX ORDER — BENZONATATE 100 MG/1
100 CAPSULE ORAL 3 TIMES DAILY PRN
Qty: 30 CAPSULE | Refills: 0 | Status: SHIPPED | OUTPATIENT
Start: 2017-09-18 | End: 2017-10-08 | Stop reason: SDUPTHER

## 2017-09-18 RX ORDER — LEVOFLOXACIN 500 MG/1
500 TABLET, FILM COATED ORAL DAILY
Qty: 10 TABLET | Refills: 0 | Status: SHIPPED | OUTPATIENT
Start: 2017-09-18 | End: 2017-11-15

## 2017-09-18 RX ORDER — PEN NEEDLE, DIABETIC 33 GX5/32"
1 NEEDLE, DISPOSABLE MISCELLANEOUS
Qty: 100 EACH | Refills: 6 | Status: SHIPPED | OUTPATIENT
Start: 2017-09-18 | End: 2017-12-18 | Stop reason: SDUPTHER

## 2017-09-19 RX ORDER — FLUCONAZOLE 150 MG/1
150 TABLET ORAL DAILY
Qty: 3 TABLET | Refills: 1 | Status: SHIPPED | OUTPATIENT
Start: 2017-09-19 | End: 2018-11-29 | Stop reason: SDUPTHER

## 2017-09-19 NOTE — PROGRESS NOTES
Subjective:       Patient ID: Alivia Thomas is a 51 y.o. female.    Chief Complaint: Follow-up    HPIPt is doing better - has lost 11 pounds since last visit - mood is improved.  Has sore throat R ear pain sinus congestion and cough.  No Cp or SOB.  Review of Systems   HENT: Positive for congestion and rhinorrhea.    Respiratory: Positive for cough. Negative for shortness of breath (PND or orthopnea).    Cardiovascular: Negative for chest pain (arm pain or jaw pain).   Gastrointestinal: Negative for abdominal pain, diarrhea, nausea and vomiting.   Genitourinary: Negative for dysuria.   Neurological: Negative for seizures, syncope and headaches.       Objective:      Physical Exam   Constitutional: She is oriented to person, place, and time. She appears well-developed and well-nourished. No distress.   HENT:   Head: Normocephalic.   Mouth/Throat: Oropharynx is clear and moist.   Neck: Neck supple. No JVD present. No thyromegaly present.   Cardiovascular: Normal rate, regular rhythm, normal heart sounds and intact distal pulses.  Exam reveals no gallop and no friction rub.    No murmur heard.  Pulmonary/Chest: Effort normal and breath sounds normal. She has no wheezes. She has no rales.   Abdominal: Soft. Bowel sounds are normal. She exhibits no distension and no mass. There is no tenderness. There is no rebound and no guarding.   Musculoskeletal: She exhibits no edema.   Lymphadenopathy:     She has no cervical adenopathy.   Neurological: She is alert and oriented to person, place, and time. She has normal reflexes.   Skin: Skin is warm and dry.   Psychiatric: She has a normal mood and affect. Her behavior is normal. Judgment and thought content normal.       Assessment:       1. Bronchitis    2. Uncontrolled type 2 diabetes mellitus without complication, with long-term current use of insulin        Plan:   Bronchitis  -     X-Ray Chest PA And Lateral; Future; Expected date: 09/18/2017  Trt with levaquin and  "tessalon  Uncontrolled type 2 diabetes mellitus without complication, with long-term current use of insulin  -     insulin detemir (LEVEMIR FLEXPEN) 100 unit/mL (3 mL) SubQ InPn pen; Inject 20 Units into the skin every evening.  Dispense: 1 Box; Refill: 11    Other orders  -     levoFLOXacin (LEVAQUIN) 500 MG tablet; Take 1 tablet (500 mg total) by mouth once daily.  Dispense: 10 tablet; Refill: 0  -     pen needle, diabetic 33 gauge x 5/32" Ndle; 1 application by Misc.(Non-Drug; Combo Route) route 4 (four) times daily with meals and nightly.  Dispense: 100 each; Refill: 6  -     benzonatate (TESSALON) 100 MG capsule; Take 1 capsule (100 mg total) by mouth 3 (three) times daily as needed.  Dispense: 30 capsule; Refill: 0    Depression  Improved.      Continue diet and weight loss - diflucan for yeast infection from abx.  "

## 2017-09-27 ENCOUNTER — TELEPHONE (OUTPATIENT)
Dept: ORTHOPEDICS | Facility: CLINIC | Age: 52
End: 2017-09-27

## 2017-09-27 NOTE — TELEPHONE ENCOUNTER
Spoke to pt and her appointment with  was rescheduled to 10/16/17 due to him having a meeting. The appointment slip was mailed to pt home.

## 2017-10-06 ENCOUNTER — TELEPHONE (OUTPATIENT)
Dept: PAIN MEDICINE | Facility: CLINIC | Age: 52
End: 2017-10-06

## 2017-10-06 DIAGNOSIS — M47.899 FACET SYNDROME: ICD-10-CM

## 2017-10-06 DIAGNOSIS — M19.90 OSTEOARTHRITIS, UNSPECIFIED OSTEOARTHRITIS TYPE, UNSPECIFIED SITE: Primary | ICD-10-CM

## 2017-10-06 RX ORDER — OXYCODONE AND ACETAMINOPHEN 10; 325 MG/1; MG/1
1 TABLET ORAL EVERY 6 HOURS PRN
Qty: 120 TABLET | Refills: 0 | Status: SHIPPED | OUTPATIENT
Start: 2017-10-06 | End: 2017-11-06 | Stop reason: SDUPTHER

## 2017-10-06 NOTE — TELEPHONE ENCOUNTER
----- Message from Jane Clements sent at 10/6/2017 12:50 PM CDT -----  Contact: pt  x_ 1st Request  _ 2nd Request  _ 3rd Request    Who: PT    Why: is calling in regards to her RX. Pt states she is out of her RX and is needing it today    What Number to Call Back: [phone]    When to Expect a call back: (Before the end of the day)  -- if call after 3:00 call back will be tomorrow.

## 2017-10-06 NOTE — TELEPHONE ENCOUNTER
----- Message from Radha Cruz sent at 10/6/2017  8:59 AM CDT -----  Contact: Patient   X _1st Request  _  2nd Request  _  3rd Request    Who:DONTAE MOON [[id number]]    Why:Patient states she is completed out of Percocet and needs a refill to be called in     What Number to Call Back:[phone]    When to Expect a call back: (Before the end of the day)   -- if call after 3:00 call back will be tomorrow.

## 2017-10-06 NOTE — TELEPHONE ENCOUNTER
Patient was contacted by staff and informed that her prescription was e prescribed to her [facility] in [place], [place].    Patient verbalized understanding and expressed thanks.

## 2017-10-08 ENCOUNTER — HOSPITAL ENCOUNTER (EMERGENCY)
Facility: HOSPITAL | Age: 52
Discharge: HOME | End: 2017-10-08
Attending: EMERGENCY MEDICINE
Payer: MEDICARE

## 2017-10-08 VITALS
RESPIRATION RATE: 18 BRPM | OXYGEN SATURATION: 98 % | DIASTOLIC BLOOD PRESSURE: 71 MMHG | TEMPERATURE: 99 F | HEART RATE: 71 BPM | BODY MASS INDEX: 48.82 KG/M2 | HEIGHT: 65 IN | SYSTOLIC BLOOD PRESSURE: 145 MMHG | WEIGHT: 293 LBS

## 2017-10-08 DIAGNOSIS — J45.901 MILD ASTHMA WITH EXACERBATION, UNSPECIFIED WHETHER PERSISTENT: ICD-10-CM

## 2017-10-08 DIAGNOSIS — R05.9 COUGH: Primary | ICD-10-CM

## 2017-10-08 LAB
ALBUMIN SERPL BCP-MCNC: 3.1 G/DL
ALP SERPL-CCNC: 62 U/L
ALT SERPL W/O P-5'-P-CCNC: 13 U/L
ANION GAP SERPL CALC-SCNC: 8 MMOL/L
AST SERPL-CCNC: 16 U/L
BACTERIA #/AREA URNS AUTO: ABNORMAL /HPF
BASOPHILS # BLD AUTO: 0.01 K/UL
BASOPHILS NFR BLD: 0.2 %
BILIRUB SERPL-MCNC: 0.6 MG/DL
BILIRUB UR QL STRIP: NEGATIVE
BUN SERPL-MCNC: 8 MG/DL
CALCIUM SERPL-MCNC: 9.1 MG/DL
CHLORIDE SERPL-SCNC: 104 MMOL/L
CLARITY UR REFRACT.AUTO: ABNORMAL
CO2 SERPL-SCNC: 27 MMOL/L
COLOR UR AUTO: YELLOW
CREAT SERPL-MCNC: 0.7 MG/DL
DIFFERENTIAL METHOD: ABNORMAL
EOSINOPHIL # BLD AUTO: 0.1 K/UL
EOSINOPHIL NFR BLD: 1.9 %
ERYTHROCYTE [DISTWIDTH] IN BLOOD BY AUTOMATED COUNT: 16.2 %
EST. GFR  (AFRICAN AMERICAN): >60 ML/MIN/1.73 M^2
EST. GFR  (NON AFRICAN AMERICAN): >60 ML/MIN/1.73 M^2
FLUAV AG SPEC QL IA: NEGATIVE
FLUBV AG SPEC QL IA: NEGATIVE
GLUCOSE SERPL-MCNC: 120 MG/DL
GLUCOSE UR QL STRIP: NEGATIVE
HCT VFR BLD AUTO: 35.1 %
HGB BLD-MCNC: 10.8 G/DL
HGB UR QL STRIP: NEGATIVE
HYALINE CASTS UR QL AUTO: 6 /LPF
KETONES UR QL STRIP: NEGATIVE
LEUKOCYTE ESTERASE UR QL STRIP: NEGATIVE
LYMPHOCYTES # BLD AUTO: 0.9 K/UL
LYMPHOCYTES NFR BLD: 17.5 %
MCH RBC QN AUTO: 25.8 PG
MCHC RBC AUTO-ENTMCNC: 30.8 G/DL
MCV RBC AUTO: 84 FL
MICROSCOPIC COMMENT: ABNORMAL
MONOCYTES # BLD AUTO: 0.6 K/UL
MONOCYTES NFR BLD: 11.2 %
NEUTROPHILS # BLD AUTO: 3.6 K/UL
NEUTROPHILS NFR BLD: 68.8 %
NITRITE UR QL STRIP: NEGATIVE
PH UR STRIP: 6 [PH] (ref 5–8)
PLATELET # BLD AUTO: 288 K/UL
PMV BLD AUTO: 10.8 FL
POTASSIUM SERPL-SCNC: 3.8 MMOL/L
PROT SERPL-MCNC: 7.4 G/DL
PROT UR QL STRIP: NEGATIVE
RBC # BLD AUTO: 4.19 M/UL
RBC #/AREA URNS AUTO: 2 /HPF (ref 0–4)
SODIUM SERPL-SCNC: 139 MMOL/L
SP GR UR STRIP: 1.02 (ref 1–1.03)
SPECIMEN SOURCE: NORMAL
SQUAMOUS #/AREA URNS AUTO: 26 /HPF
URN SPEC COLLECT METH UR: ABNORMAL
UROBILINOGEN UR STRIP-ACNC: NEGATIVE EU/DL
WBC # BLD AUTO: 5.26 K/UL
WBC #/AREA URNS AUTO: 8 /HPF (ref 0–5)

## 2017-10-08 PROCEDURE — 85025 COMPLETE CBC W/AUTO DIFF WBC: CPT

## 2017-10-08 PROCEDURE — 25000003 PHARM REV CODE 250: Performed by: EMERGENCY MEDICINE

## 2017-10-08 PROCEDURE — 94640 AIRWAY INHALATION TREATMENT: CPT

## 2017-10-08 PROCEDURE — 63600175 PHARM REV CODE 636 W HCPCS: Performed by: EMERGENCY MEDICINE

## 2017-10-08 PROCEDURE — 81001 URINALYSIS AUTO W/SCOPE: CPT

## 2017-10-08 PROCEDURE — 80053 COMPREHEN METABOLIC PANEL: CPT

## 2017-10-08 PROCEDURE — 99284 EMERGENCY DEPT VISIT MOD MDM: CPT | Mod: ,,, | Performed by: EMERGENCY MEDICINE

## 2017-10-08 PROCEDURE — 25000242 PHARM REV CODE 250 ALT 637 W/ HCPCS: Performed by: STUDENT IN AN ORGANIZED HEALTH CARE EDUCATION/TRAINING PROGRAM

## 2017-10-08 PROCEDURE — 25000242 PHARM REV CODE 250 ALT 637 W/ HCPCS: Performed by: EMERGENCY MEDICINE

## 2017-10-08 PROCEDURE — 87400 INFLUENZA A/B EACH AG IA: CPT | Mod: 59

## 2017-10-08 PROCEDURE — 99284 EMERGENCY DEPT VISIT MOD MDM: CPT | Mod: 25

## 2017-10-08 RX ORDER — IPRATROPIUM BROMIDE AND ALBUTEROL SULFATE 2.5; .5 MG/3ML; MG/3ML
3 SOLUTION RESPIRATORY (INHALATION)
Status: COMPLETED | OUTPATIENT
Start: 2017-10-08 | End: 2017-10-08

## 2017-10-08 RX ORDER — PREDNISONE 50 MG/1
50 TABLET ORAL DAILY
Qty: 5 TABLET | Refills: 0 | Status: SHIPPED | OUTPATIENT
Start: 2017-10-08 | End: 2017-10-18

## 2017-10-08 RX ORDER — FLUTICASONE PROPIONATE 50 MCG
1 SPRAY, SUSPENSION (ML) NASAL 2 TIMES DAILY PRN
Qty: 15 G | Refills: 0 | Status: SHIPPED | OUTPATIENT
Start: 2017-10-08 | End: 2022-05-09 | Stop reason: SDUPTHER

## 2017-10-08 RX ORDER — ACETAMINOPHEN 500 MG
1000 TABLET ORAL
Status: COMPLETED | OUTPATIENT
Start: 2017-10-08 | End: 2017-10-08

## 2017-10-08 RX ORDER — BENZONATATE 100 MG/1
100 CAPSULE ORAL 3 TIMES DAILY PRN
Qty: 30 CAPSULE | Refills: 0 | Status: SHIPPED | OUTPATIENT
Start: 2017-10-08 | End: 2017-10-18

## 2017-10-08 RX ORDER — IBUPROFEN 400 MG/1
400 TABLET ORAL
Status: COMPLETED | OUTPATIENT
Start: 2017-10-08 | End: 2017-10-08

## 2017-10-08 RX ADMIN — IPRATROPIUM BROMIDE AND ALBUTEROL SULFATE 3 ML: .5; 3 SOLUTION RESPIRATORY (INHALATION) at 12:10

## 2017-10-08 RX ADMIN — IPRATROPIUM BROMIDE AND ALBUTEROL SULFATE 3 ML: .5; 3 SOLUTION RESPIRATORY (INHALATION) at 02:10

## 2017-10-08 RX ADMIN — ACETAMINOPHEN 1000 MG: 500 TABLET ORAL at 12:10

## 2017-10-08 RX ADMIN — IPRATROPIUM BROMIDE AND ALBUTEROL SULFATE 3 ML: .5; 3 SOLUTION RESPIRATORY (INHALATION) at 01:10

## 2017-10-08 RX ADMIN — PREDNISONE 50 MG: 20 TABLET ORAL at 12:10

## 2017-10-08 RX ADMIN — IBUPROFEN 400 MG: 400 TABLET, FILM COATED ORAL at 12:10

## 2017-10-08 NOTE — ED TRIAGE NOTES
Patient states she has body aches and a terrible cough for about 3 weeks.  Patient states she was given antibiotics and has been using an inhaler but it did not help.  Patient is an asthmatic.

## 2017-10-08 NOTE — ED PROVIDER NOTES
Encounter Date: 10/8/2017    SCRIBE #1 NOTE: I, Zaynab Bailey, am scribing for, and in the presence of,  Dr. Dumont. I have scribed the following portions of the note - the Resident attestation.       History     Chief Complaint   Patient presents with    Generalized Body Aches     Pt presented to the ED via pov. Pt c/o body aches, congestion, and cough x 2 weeks. Pt c/o mild fever as well.      50 yo female with PMH of asthma and PADDY presents with cough, generalized body aches, and subjective fevers x 3 weeks.  Pt reports that she saw her PCP at onset of symptoms and was prescribed a 10 day course of abx.  Denies any improvement.  Denies nausea/vomitting.  Has been using home albuterol more frequently 2/2 cough.           Review of patient's allergies indicates:  No Known Allergies  Past Medical History:   Diagnosis Date    Asthma     Diabetes mellitus type II     DJD (degenerative joint disease) of knee 6/19/2014    Hyperlipidemia     Morbid obesity     Sleep apnea      Past Surgical History:   Procedure Laterality Date    CARPAL TUNNEL RELEASE      CARPAL TUNNEL RELEASE  1980s    left    CARPAL TUNNEL RELEASE  2012    right    JOINT REPLACEMENT Bilateral     with 2 revisions on rt    KNEE SURGERY  3/2010    orthroscope    KNEE SURGERY  6-19-14    left TKR     Family History   Problem Relation Age of Onset    Diabetes Mother     Hypertension Mother     Cataracts Mother     Diabetes Father     Cataracts Father     Coronary artery disease Brother     Amblyopia Neg Hx     Blindness Neg Hx     Cancer Neg Hx     Glaucoma Neg Hx     Macular degeneration Neg Hx     Retinal detachment Neg Hx     Strabismus Neg Hx     Stroke Neg Hx     Thyroid disease Neg Hx      Social History   Substance Use Topics    Smoking status: Never Smoker    Smokeless tobacco: Never Used    Alcohol use Yes      Comment: occasionally      Review of Systems   Constitutional: Positive for fatigue and fever.   HENT:  Negative for congestion and sinus pressure.    Eyes: Negative for photophobia and visual disturbance.   Respiratory: Positive for cough and wheezing.    Cardiovascular: Negative for chest pain and palpitations.   Gastrointestinal: Negative for nausea and vomiting.   Genitourinary: Negative for dysuria and frequency.   Musculoskeletal: Positive for arthralgias and myalgias.   Skin: Negative for rash and wound.   Neurological: Positive for weakness. Negative for seizures.       Physical Exam     Initial Vitals [10/08/17 1146]   BP Pulse Resp Temp SpO2   (!) 145/71 71 18 97.8 °F (36.6 °C) 98 %      MAP       95.67         Physical Exam    Constitutional: She appears well-developed and well-nourished.   HENT:   Head: Normocephalic and atraumatic.   Eyes: Conjunctivae and EOM are normal.   Neck: Thyromegaly present. No tracheal deviation present.   Cardiovascular: Normal rate and regular rhythm.   Pulmonary/Chest: No respiratory distress. She has wheezes.   Abdominal: Soft. There is no tenderness.   Musculoskeletal: Normal range of motion.   Neurological: She is alert and oriented to person, place, and time.   Skin: Skin is warm and dry.   Psychiatric: She has a normal mood and affect. Thought content normal.         ED Course   Procedures  Labs Reviewed - No data to display       X-Rays:   Independently Interpreted Readings:   Chest X-Ray: No opacities seen on CXR.  Lung fields clear     Medical Decision Making:   History:   Old Medical Records: I decided to obtain old medical records.  Clinical Tests:   Lab Tests: Ordered and Reviewed  Radiological Study: Ordered and Reviewed       APC / Resident Notes:   50 yo female with PADDY, asthma, and DM who presents with generalized body aches and cough x 3 weeks.  Differential diagnosis includes: flu, pneumonia, viral URTI.  Will obtain CXR and labs including cbc, bmp.        Laboratory evaluarion benign.  Negative for flu, no white count.  CXR clear.  Given breathing treatment  x 2.      Pt feeling better after second breathing treatment.  Will d/c home on 5 day course of steroids.  F/u with PCP this week.         Scribe Attestation:   Scribe #1: I performed the above scribed service and the documentation accurately describes the services I performed. I attest to the accuracy of the note.  Comments: I, Dr. Kindra Dumont, personally performed the services described in this documentation. All medical record entries made by the scribe were at my direction and in my presence.  I have reviewed the chart and agree that the record reflects my personal performance and is accurate and complete. Kindra Dumont MD.  4:37 PM 10/17/2017      Attending Attestation:   Physician Attestation Statement for Resident:  As the supervising MD   Physician Attestation Statement: I have personally seen and examined this patient.   I agree with the above history. -: Pt with hx of asthma and DM presenting with 3 weeks URI symptoms, coughing, increased use of asthma pump, and myalgias.    As the supervising MD I agree with the above PE.    As the supervising MD I agree with the above treatment, course, plan, and disposition.   -: Xray shows no PNA. Labs unremarkable. Pt feels much better after steroids and duo nebs. Will discharge with course of nasal steroids and outpatient PCP follow up.   I have reviewed and agree with the residents interpretation of the following: x-rays and lab data.  I have reviewed the following: old records at this facility.                    ED Course      Clinical Impression:   The primary encounter diagnosis was Cough. A diagnosis of Mild asthma with exacerbation, unspecified whether persistent was also pertinent to this visit.    Disposition:   Disposition: Discharged  Condition: Stable                        Kindra Dumont MD  10/17/17 4002

## 2017-10-09 ENCOUNTER — OFFICE VISIT (OUTPATIENT)
Dept: PODIATRY | Facility: CLINIC | Age: 52
End: 2017-10-09
Payer: MEDICARE

## 2017-10-09 ENCOUNTER — OFFICE VISIT (OUTPATIENT)
Dept: OBSTETRICS AND GYNECOLOGY | Facility: CLINIC | Age: 52
End: 2017-10-09
Payer: MEDICARE

## 2017-10-09 VITALS
DIASTOLIC BLOOD PRESSURE: 88 MMHG | BODY MASS INDEX: 48.82 KG/M2 | SYSTOLIC BLOOD PRESSURE: 134 MMHG | WEIGHT: 293 LBS | HEIGHT: 65 IN

## 2017-10-09 VITALS
HEIGHT: 65 IN | WEIGHT: 293 LBS | DIASTOLIC BLOOD PRESSURE: 98 MMHG | SYSTOLIC BLOOD PRESSURE: 154 MMHG | HEART RATE: 83 BPM | BODY MASS INDEX: 48.82 KG/M2

## 2017-10-09 DIAGNOSIS — G47.30 SLEEP APNEA, UNSPECIFIED TYPE: Primary | ICD-10-CM

## 2017-10-09 DIAGNOSIS — Z12.31 VISIT FOR SCREENING MAMMOGRAM: ICD-10-CM

## 2017-10-09 DIAGNOSIS — E11.8 UNCONTROLLED TYPE 2 DIABETES MELLITUS WITH COMPLICATION, UNSPECIFIED LONG TERM INSULIN USE STATUS: ICD-10-CM

## 2017-10-09 DIAGNOSIS — J45.20 MILD INTERMITTENT ASTHMA WITHOUT COMPLICATION: ICD-10-CM

## 2017-10-09 DIAGNOSIS — E11.49 TYPE II DIABETES MELLITUS WITH NEUROLOGICAL MANIFESTATIONS: Primary | ICD-10-CM

## 2017-10-09 DIAGNOSIS — M17.0 PRIMARY OSTEOARTHRITIS OF BOTH KNEES: ICD-10-CM

## 2017-10-09 DIAGNOSIS — E78.5 HYPERLIPIDEMIA, UNSPECIFIED HYPERLIPIDEMIA TYPE: ICD-10-CM

## 2017-10-09 DIAGNOSIS — B35.1 DERMATOPHYTOSIS OF NAIL: ICD-10-CM

## 2017-10-09 DIAGNOSIS — Z01.419 ENCOUNTER FOR GYNECOLOGICAL EXAMINATION WITHOUT ABNORMAL FINDING: ICD-10-CM

## 2017-10-09 DIAGNOSIS — E11.65 UNCONTROLLED TYPE 2 DIABETES MELLITUS WITH COMPLICATION, UNSPECIFIED LONG TERM INSULIN USE STATUS: ICD-10-CM

## 2017-10-09 DIAGNOSIS — L84 CORNS AND CALLUS: ICD-10-CM

## 2017-10-09 DIAGNOSIS — E66.01 MORBID OBESITY: ICD-10-CM

## 2017-10-09 PROCEDURE — 99213 OFFICE O/P EST LOW 20 MIN: CPT | Mod: S$PBB,,, | Performed by: OBSTETRICS & GYNECOLOGY

## 2017-10-09 PROCEDURE — 11721 DEBRIDE NAIL 6 OR MORE: CPT | Mod: 59,Q9,S$PBB, | Performed by: PODIATRIST

## 2017-10-09 PROCEDURE — 99499 UNLISTED E&M SERVICE: CPT | Mod: S$PBB,,, | Performed by: PODIATRIST

## 2017-10-09 PROCEDURE — 99213 OFFICE O/P EST LOW 20 MIN: CPT | Mod: PBBFAC,25,27 | Performed by: OBSTETRICS & GYNECOLOGY

## 2017-10-09 PROCEDURE — 11055 PARING/CUTG B9 HYPRKER LES 1: CPT | Mod: Q9,59,PBBFAC | Performed by: PODIATRIST

## 2017-10-09 PROCEDURE — 99999 PR PBB SHADOW E&M-EST. PATIENT-LVL III: CPT | Mod: PBBFAC,,, | Performed by: PODIATRIST

## 2017-10-09 PROCEDURE — 99999 PR PBB SHADOW E&M-EST. PATIENT-LVL III: CPT | Mod: PBBFAC,,, | Performed by: OBSTETRICS & GYNECOLOGY

## 2017-10-09 PROCEDURE — 99213 OFFICE O/P EST LOW 20 MIN: CPT | Mod: PBBFAC,25 | Performed by: PODIATRIST

## 2017-10-09 NOTE — LETTER
October 14, 2017      Clarisse Richardson MD  [address]  [facility] [address]           [facility] 5th Floor  [address]  [facility] [address]  Phone: [phone]          Patient: Alivia Thomas   MR Number: [id number]   YOB: 1965   Date of Visit: 10/9/2017       Dear Dr. Clarisse Richardson:    Thank you for referring Alivia Thomas to me for evaluation. Attached you will find relevant portions of my assessment and plan of care.    If you have questions, please do not hesitate to call me. I look forward to following Alivia Thomas along with you.    Sincerely,    Leeanne Silva MD    Enclosure  CC:  No Recipients    If you would like to receive this communication electronically, please contact [email or web address] or [phone] to request more information on [employer] Link access.    For providers and/or their staff who would like to refer a patient to [facility], please contact us through our one-stop-shop provider referral line, [facility], at [phone].    If you feel you have received this communication in error or would no longer like to receive these types of communications, please e-mail [email or web address]

## 2017-10-09 NOTE — PROGRESS NOTES
HISTORY OF PRESENT ILLNESS:    Alivia Thomas is a 51 y.o. female, , Patient's last menstrual period was 2017.,  presents for a follow up exam.   Patient seen in April for WWE. Cycles at that time were every 2-3 months with light bleeding.   Now, cycles occur every 2 months lasting 7-10 days using 5-6 tampons per day. Cycles previously 5-7 days with same amount of bleeding.     External Result Report     External Result Report   Narrative     Transabdominal and transvaginal pelvic ultrasound examination was performed.  The uterus is enlarged measuring 16.1 x 11.4 x 10.5 cm.  There is a subserosal fibroid originating from the fundus of the uterus measuring 8.9 x 8.6 x 8.6 cm.  The endometrial stripe is not clearly delineated.  The right ovary measures 8.9 x 2.6 x 2.7 cm in size and contains a dominant follicle measuring 2.2 cm in greatest dimension.  There is arterial and venous blood flow identified within the right ovary.  The left ovary was not visualized.  No free fluid is identified within the pelvis.   Impression       Enlarged uterus with single subserosal fibroid within the fundus of the uterus measuring 8.9 cm in greatest dimension. Left ovary was not visualized.            Past Medical History:   Diagnosis Date    Asthma     Diabetes mellitus type II     DJD (degenerative joint disease) of knee 2014    Hyperlipidemia     Morbid obesity     Sleep apnea        Past Surgical History:   Procedure Laterality Date    CARPAL TUNNEL RELEASE      CARPAL TUNNEL RELEASE  1980s    left    CARPAL TUNNEL RELEASE      right    JOINT REPLACEMENT Bilateral     with 2 revisions on rt    KNEE SURGERY  3/2010    orthroscope    KNEE SURGERY  14    left TKR       MEDICATIONS AND ALLERGIES:      Current Outpatient Prescriptions:     albuterol 90 mcg/actuation inhaler, Take 2 puffs every 4-6 hours  as needed for shortness of breath/wheezing, Disp: 1 Inhaler, Rfl: 6    aspirin (ECOTRIN) 81  MG EC tablet, Take 1 tablet by mouth every morning. prevents heart attacks and strokes, Disp: , Rfl:     atorvastatin (LIPITOR) 20 MG tablet, Take 1 tablet (20 mg total) by mouth once daily., Disp: 30 tablet, Rfl: 6    benzonatate (TESSALON) 100 MG capsule, Take 1 capsule (100 mg total) by mouth 3 (three) times daily as needed for Cough., Disp: 30 capsule, Rfl: 0    blood sugar diagnostic Strp, Freestyle light strips and lancets, Disp: 100 each, Rfl: 6    celecoxib (CELEBREX) 200 MG capsule, TAKE ONE CAPSULE BY MOUTH DAILY, Disp: 30 capsule, Rfl: 2    econazole nitrate 1 % cream, Apply topically 2 (two) times daily., Disp: 30 g, Rfl: 2    fluoxetine (PROZAC) 20 MG capsule, Take 1 capsule (20 mg total) by mouth once daily., Disp: 30 capsule, Rfl: 6    fluticasone (FLONASE) 50 mcg/actuation nasal spray, 1 spray by Each Nare route 2 (two) times daily as needed for Rhinitis., Disp: 15 g, Rfl: 0    HUMALOG 100 unit/mL injection, INJECT 11 UNITS SUBCUTANEOUSLY THREE TIMES DAILY BEFORE MEAL(S) PLUS  SLIDING  SCALE, Disp: 10 mL, Rfl: 6    insulin detemir (LEVEMIR FLEXPEN) 100 unit/mL (3 mL) SubQ InPn pen, Inject 20 Units into the skin every evening., Disp: 1 Box, Rfl: 11    insulin lispro (HUMALOG KWIKPEN) 100 unit/mL InPn pen, 11 Units before meals plus sliding scale, Disp: 1 Box, Rfl: 6    insulin lispro (HUMALOG) 100 unit/mL injection, Inject 11 Units into the skin 3 (three) times daily before meals. Glucose        Intervention  (units to add in addition to meal time Humalog base dose of 11 Units)                       0-80    orange juice            0 units    151 - 200       +2 Units    201 - 250       +4 Units    251 - 300       +6 Units    301 - 350        +8 Units    351 - 400       +10 Units    > 400           Call MD, Disp: 3 mL, Rfl: 11    insulin lispro (HUMALOG) 100 unit/mL injection, 11 Units before meals plus sliding scale, Disp: 5 vial, Rfl: 6    levoFLOXacin (LEVAQUIN) 500 MG tablet, Take  "1 tablet (500 mg total) by mouth once daily., Disp: 10 tablet, Rfl: 0    lisinopril (PRINIVIL,ZESTRIL) 2.5 MG tablet, One daily for proteinuria., Disp: 30 tablet, Rfl: 6    metformin (GLUCOPHAGE-XR) 500 MG 24 hr tablet, Take 2 tablets (1,000 mg total) by mouth 2 (two) times daily., Disp: 360 tablet, Rfl: 6    omeprazole (PRILOSEC) 20 MG capsule, Take 1 capsule (20 mg total) by mouth every morning., Disp: 30 capsule, Rfl: 6    oxycodone-acetaminophen (PERCOCET)  mg per tablet, Take 1 tablet by mouth every 6 (six) hours as needed for Pain., Disp: 120 tablet, Rfl: 0    pen needle, diabetic 33 gauge x 5/32" Ndle, 1 application by Misc.(Non-Drug; Combo Route) route 4 (four) times daily with meals and nightly., Disp: 100 each, Rfl: 6    predniSONE (DELTASONE) 50 MG Tab, Take 1 tablet (50 mg total) by mouth once daily., Disp: 5 tablet, Rfl: 0    vitamin D 185 MG Tab, Take 5,000 mg by mouth once daily., Disp: , Rfl:     Review of patient's allergies indicates:  No Known Allergies    Family History   Problem Relation Age of Onset    Diabetes Mother     Hypertension Mother     Cataracts Mother     Diabetes Father     Cataracts Father     Coronary artery disease Brother     Amblyopia Neg Hx     Blindness Neg Hx     Cancer Neg Hx     Glaucoma Neg Hx     Macular degeneration Neg Hx     Retinal detachment Neg Hx     Strabismus Neg Hx     Stroke Neg Hx     Thyroid disease Neg Hx        Social History     Social History    Marital status: Single     Spouse name: N/A    Number of children: N/A    Years of education: N/A     Occupational History    Not on file.     Social History Main Topics    Smoking status: Never Smoker    Smokeless tobacco: Never Used    Alcohol use Yes      Comment: occasionally     Drug use: No    Sexual activity: Yes     Partners: Female     Birth control/ protection: None     Other Topics Concern    Not on file     Social History Narrative    Disabled. The patient is the " "youngest of 6 siblings. Single. Lives with single-sex partner.                        COMPREHENSIVE GYN HISTORY:  PAP History: Denies abnormal Paps.  Infection History: Denies STDs. Denies PID.  Benign History: Denies uterine fibroids. Denies ovarian cysts. Denies endometriosis. Denies other conditions.  Cancer History: Denies cervical cancer. Denies uterine cancer or hyperplasia. Denies ovarian cancer. Denies vulvar cancer or pre-cancer. Denies vaginal cancer or pre-cancer. Denies breast cancer. Denies colon cancer.  Sexual Activity History: Reports currently being sexually active  Menstrual History: Monthly. Mod then light flow.   Dysmenorrhea History: Reports mild dysmenorrhea.       ROS:  GENERAL: No weight changes. No swelling. No fatigue. No fever.  CARDIOVASCULAR: No chest pain. No shortness of breath. No leg cramps.   NEUROLOGICAL: No headaches. No vision changes.  BREASTS: No pain. No lumps. No discharge.  ABDOMEN: No pain. No nausea. No vomiting. No diarrhea. No constipation.  REPRODUCTIVE: No abnormal bleeding.   VULVA: No pain. No lesions. No itching.  VAGINA: No relaxation. No itching. No odor. No discharge. No lesions.  URINARY: No incontinence. No nocturia. No frequency. No dysuria.    /88   Ht 5' 5" (1.651 m)   Wt (!) 154.5 kg (340 lb 9.8 oz)   LMP 09/11/2017   BMI 56.68 kg/m²     PE:  APPEARANCE: Well nourished, well developed, in no acute distress.  AFFECT: WNL, alert and oriented x 3.  SKIN: No acne or hirsutism.  NECK: Neck symmetric, without masses or thyromegaly.  NODES: No inguinal, cervical, axillary or femoral lymph node enlargement.  CHEST: Good respiratory effort.   ABDOMEN: Soft. No tenderness or masses. No hepatosplenomegaly. No hernias.  BREASTS: Symmetrical, no skin changes, visible lesions, palpable masses or nipple discharge bilaterally.  PELVIC: External female genitalia without lesions.  Female hair distribution. Adequate perineal body, Normal urethral meatus. Vagina " moist and well rugated without lesions or discharge.  No significant cystocele or rectocele present. Cervix pink without lesions, discharge or tenderness. Uterus is 4-6 week size, regular, mobile and nontender. Adnexa without masses or tenderness.  EXTREMITIES: No edema    DIAGNOSIS:  1. Sleep apnea, unspecified type    2. Mild intermittent asthma without complication    3. Hyperlipidemia, unspecified hyperlipidemia type    4. Uncontrolled type 2 diabetes mellitus with complication, unspecified long term insulin use status    5. Morbid obesity    6. Encounter for gynecological examination without abnormal finding    7. Visit for screening mammogram    8. Primary osteoarthritis of both knees      COUNSELING:  The patient was counseled today on:  -A.C.S. Pap and pelvic exam guidelines (pap every 3 years), recomendations for yearly mammogram;  -to follow up with her PCP for other health maintenance.    FOLLOW-UP with me for EMBX

## 2017-10-09 NOTE — PROGRESS NOTES
CC:     Foot exam     HPI:   The patient is a 51 y.o.  female  who presents for a diabetic foot exam.   Patient reports + presence of abnormal sensation to the feet, just sometimes.     Patient has no history of wounds on the feet.   Hx of foot surgery: none.   Shoe gear: athletic type  This patient has documented high risk feet requiring routine maintenance secondary to diabetes.  Main concern today is toenail debridement.        Primary care doctor is: Clarisse Richardson MD  Patient last saw primary care doctor on:     Chief Complaint   Patient presents with    PCP     Debra 09/18/2017    Diabetic Foot Exam    Nail Care              Past Medical History:   Diagnosis Date    Asthma     Diabetes mellitus type II     DJD (degenerative joint disease) of knee 6/19/2014    Hyperlipidemia     Morbid obesity     Sleep apnea          Current Outpatient Prescriptions on File Prior to Visit   Medication Sig Dispense Refill    albuterol 90 mcg/actuation inhaler Take 2 puffs every 4-6 hours  as needed for shortness of breath/wheezing 1 Inhaler 6    aspirin (ECOTRIN) 81 MG EC tablet Take 1 tablet by mouth every morning. prevents heart attacks and strokes      atorvastatin (LIPITOR) 20 MG tablet Take 1 tablet (20 mg total) by mouth once daily. 30 tablet 6    benzonatate (TESSALON) 100 MG capsule Take 1 capsule (100 mg total) by mouth 3 (three) times daily as needed for Cough. 30 capsule 0    blood sugar diagnostic Strp Freestyle light strips and lancets 100 each 6    celecoxib (CELEBREX) 200 MG capsule TAKE ONE CAPSULE BY MOUTH DAILY 30 capsule 2    econazole nitrate 1 % cream Apply topically 2 (two) times daily. 30 g 2    fluoxetine (PROZAC) 20 MG capsule Take 1 capsule (20 mg total) by mouth once daily. 30 capsule 6    fluticasone (FLONASE) 50 mcg/actuation nasal spray 1 spray by Each Nare route 2 (two) times daily as needed for Rhinitis. 15 g 0    HUMALOG 100 unit/mL injection INJECT 11 UNITS  "SUBCUTANEOUSLY THREE TIMES DAILY BEFORE MEAL(S) PLUS  SLIDING  SCALE 10 mL 6    insulin detemir (LEVEMIR FLEXPEN) 100 unit/mL (3 mL) SubQ InPn pen Inject 20 Units into the skin every evening. 1 Box 11    insulin lispro (HUMALOG KWIKPEN) 100 unit/mL InPn pen 11 Units before meals plus sliding scale 1 Box 6    insulin lispro (HUMALOG) 100 unit/mL injection Inject 11 Units into the skin 3 (three) times daily before meals. Glucose        Intervention  (units to add in addition to meal time Humalog base dose of 11 Units)                        0-80    orange juice             0 units     151 - 200       +2 Units     201 - 250       +4 Units     251 - 300       +6 Units     301 - 350        +8 Units     351 - 400       +10 Units     > 400           Call MD 3 mL 11    insulin lispro (HUMALOG) 100 unit/mL injection 11 Units before meals plus sliding scale 5 vial 6    levoFLOXacin (LEVAQUIN) 500 MG tablet Take 1 tablet (500 mg total) by mouth once daily. 10 tablet 0    lisinopril (PRINIVIL,ZESTRIL) 2.5 MG tablet One daily for proteinuria. 30 tablet 6    metformin (GLUCOPHAGE-XR) 500 MG 24 hr tablet Take 2 tablets (1,000 mg total) by mouth 2 (two) times daily. 360 tablet 6    omeprazole (PRILOSEC) 20 MG capsule Take 1 capsule (20 mg total) by mouth every morning. 30 capsule 6    oxycodone-acetaminophen (PERCOCET)  mg per tablet Take 1 tablet by mouth every 6 (six) hours as needed for Pain. 120 tablet 0    pen needle, diabetic 33 gauge x 5/32" Ndle 1 application by Misc.(Non-Drug; Combo Route) route 4 (four) times daily with meals and nightly. 100 each 6    predniSONE (DELTASONE) 50 MG Tab Take 1 tablet (50 mg total) by mouth once daily. 5 tablet 0    vitamin D 185 MG Tab Take 5,000 mg by mouth once daily.       Current Facility-Administered Medications on File Prior to Visit   Medication Dose Route Frequency Provider Last Rate Last Dose    [COMPLETED] acetaminophen tablet 1,000 mg  1,000 mg Oral ED 1 " Time Kindra Dumont MD   1,000 mg at 10/08/17 1223    [COMPLETED] albuterol-ipratropium 2.5mg-0.5mg/3mL nebulizer solution 3 mL  3 mL Nebulization Q5 Min Jerrod Bradshaw MD   3 mL at 10/08/17 1301    [COMPLETED] albuterol-ipratropium 2.5mg-0.5mg/3mL nebulizer solution 3 mL  3 mL Nebulization Q5 Min Kindra Dumont MD   3 mL at 10/08/17 1440    [COMPLETED] ibuprofen tablet 400 mg  400 mg Oral ED 1 Time Kindra Dumont MD   400 mg at 10/08/17 1223    [COMPLETED] predniSONE tablet 50 mg  50 mg Oral ED 1 Time Kindra Dumont MD   50 mg at 10/08/17 1223       Review of patient's allergies indicates:  No Known Allergies          ROS:  General ROS: negative  Respiratory ROS: no cough, shortness of breath, or wheezing  Cardiovascular ROS: no chest pain or dyspnea on exertion  Musculoskeletal ROS: negative  Neurological ROS:   negative for - gait disturbance, numbness/tingling, seizures or tremors  Dermatological ROS: negative          LAST HbA1c:   Hemoglobin A1C   Date Value Ref Range Status   08/07/2017 9.3 (H) 4.0 - 5.6 % Final     Comment:     According to ADA guidelines, hemoglobin A1c <7.0% represents  optimal control in non-pregnant diabetic patients. Different  metrics may apply to specific patient populations.   Standards of Medical Care in Diabetes-2016.  For the purpose of screening for the presence of diabetes:  <5.7%     Consistent with the absence of diabetes  5.7-6.4%  Consistent with increasing risk for diabetes   (prediabetes)  >or=6.5%  Consistent with diabetes  Currently, no consensus exists for use of hemoglobin A1c  for diagnosis of diabetes for children.  This Hemoglobin A1c assay has significant interference with fetal   hemoglobin   (HbF). The results are invalid for patients with abnormal amounts of   HbF,   including those with known Hereditary Persistence   of Fetal Hemoglobin. Heterozygous hemoglobin variants (HbAS, HbAC,   HbAD, HbAE, HbA2) do not significantly interfere with this  "assay;   however, presence of multiple variants in a sample may impact the %   interference.     05/09/2017 9.9 (H) 4.5 - 6.2 % Final     Comment:     According to ADA guidelines, hemoglobin A1C <7.0% represents  optimal control in non-pregnant diabetic patients.  Different  metrics may apply to specific populations.   Standards of Medical Care in Diabetes - 2016.  For the purpose of screening for the presence of diabetes:  <5.7%     Consistent with the absence of diabetes  5.7-6.4%  Consistent with increasing risk for diabetes   (prediabetes)  >or=6.5%  Consistent with diabetes  Currently no consensus exists for use of hemoglobin A1C  for diagnosis of diabetes for children.     12/15/2016 10.6 (H) 4.5 - 6.2 % Final     Comment:     According to ADA guidelines, hemoglobin A1C <7.0% represents  optimal control in non-pregnant diabetic patients.  Different  metrics may apply to specific populations.   Standards of Medical Care in Diabetes - 2016.  For the purpose of screening for the presence of diabetes:  <5.7%     Consistent with the absence of diabetes  5.7-6.4%  Consistent with increasing risk for diabetes   (prediabetes)  >or=6.5%  Consistent with diabetes  Currently no consensus exists for use of hemoglobin A1C  for diagnosis of diabetes for children.           EXAM:   Vitals:    10/09/17 0817   BP: (!) 154/98   Pulse: 83   Weight: (!) 149.7 kg (330 lb)   Height: 5' 5" (1.651 m)       General: Pt. is well-developed, well-nourished, appears stated age, in no acute distress, alert and oriented x 3.      Vascular: Dorsalis pedis and posterior tibial pulses are 2/4 bilaterally. 3 secs capillary refill time and toes are warm to touch.  There is reduced hair growth on the feet.  There is 1+ edema.  no varicosities.      Neurological:  Light touch, proprioception, and sharp/dull sensation are all intact bilaterally. Protective threshold with the [place] monofilament is diminished bilaterally. "     Dermatological:  The skin is intact, warm.  The toenails  1-5 L and 1-5 R  are greenish- yellow, long by 5-6mm and thick by 2-3mm with subungual debris  .  There is presence of hyperkeratotic lesions, left heel.  There are no open wounds. There is no ecchymoses.  no erythema noted.     Interdigital spaces are intact, no fissures    Skin atrophic, thin, dry and mildly hyperpigmented.     Musculoskeletal:  Muscle strength is 5/5 in all groups bilaterally.  Metatarsophalangeal, subtalar, and ankle range of motion are intact without crepitus.           Assessment / Plan:  Shoe inspection. Diabetic Foot Education. Patient reminded of the importance of good nutrition and blood sugar control to help prevent podiatric complications of diabetes. Patient instructed on proper foot hygiene. We discussed wearing proper shoe gear, daily foot inspections, never walking without protective shoe gear, never putting sharp instruments to feet, and routine diabetic foot exam and care every 3-6 months to prevent complications.      Type II diabetes mellitus with neurological manifestations    Dermatophytosis of nail    Corns and callus    Other orders  -     Foot Care         Routine Foot Care  Date/Time: 10/9/2017 8:50 AM  Performed by: JOSÉ ANTONIO WILLIS  Authorized by: JOSÉ ANTONIO WILLIS     Consent Done?:  Yes (Verbal)  Hyperkeratotic Skin Lesions?: Yes    Number of trimmed lesions:  1  Location(s):  Left Heel    Nail Care Type:  Debride  Location(s): All  (Left 1st Toe, Left 3rd Toe, Left 2nd Toe, Left 4th Toe, Left 5th Toe, Right 1st Toe, Right 2nd Toe, Right 3rd Toe, Right 4th Toe and Right 5th Toe)  Patient tolerance:  Patient tolerated the procedure well with no immediate complications     With patient's permission, the toenails mentioned above were aggressively reduced and debrided using a nail nipper, removing all offending nail and debris. Utilizing a #15 scalpel, I trimmed the corns and calluses at the above mentioned location.       The patient will continue to monitor the areas daily, inspect the feet, wear protective shoe gear when ambulatory, and moisturizer to maintain skin integrity.      This patient has documented high risk feet requiring routine maintenance secondary to diabetes     Return in about 3 months (around 1/9/2018) for foot care, or sooner if concerned.

## 2017-10-09 NOTE — PATIENT INSTRUCTIONS
Your a1c    Hemoglobin A1C   Date Value Ref Range Status   08/07/2017 9.3 (H) 4.0 - 5.6 % Final     Comment:     According to ADA guidelines, hemoglobin A1c <7.0% represents  optimal control in non-pregnant diabetic patients. Different  metrics may apply to specific patient populations.   Standards of Medical Care in Diabetes-2016.  For the purpose of screening for the presence of diabetes:  <5.7%     Consistent with the absence of diabetes  5.7-6.4%  Consistent with increasing risk for diabetes   (prediabetes)  >or=6.5%  Consistent with diabetes  Currently, no consensus exists for use of hemoglobin A1c  for diagnosis of diabetes for children.  This Hemoglobin A1c assay has significant interference with fetal   hemoglobin   (HbF). The results are invalid for patients with abnormal amounts of   HbF,   including those with known Hereditary Persistence   of Fetal Hemoglobin. Heterozygous hemoglobin variants (HbAS, HbAC,   HbAD, HbAE, HbA2) do not significantly interfere with this assay;   however, presence of multiple variants in a sample may impact the %   interference.     05/09/2017 9.9 (H) 4.5 - 6.2 % Final     Comment:     According to ADA guidelines, hemoglobin A1C <7.0% represents  optimal control in non-pregnant diabetic patients.  Different  metrics may apply to specific populations.   Standards of Medical Care in Diabetes - 2016.  For the purpose of screening for the presence of diabetes:  <5.7%     Consistent with the absence of diabetes  5.7-6.4%  Consistent with increasing risk for diabetes   (prediabetes)  >or=6.5%  Consistent with diabetes  Currently no consensus exists for use of hemoglobin A1C  for diagnosis of diabetes for children.     12/15/2016 10.6 (H) 4.5 - 6.2 % Final     Comment:     According to ADA guidelines, hemoglobin A1C <7.0% represents  optimal control in non-pregnant diabetic patients.  Different  metrics may apply to specific populations.   Standards of Medical Care in Diabetes -  2016.  For the purpose of screening for the presence of diabetes:  <5.7%     Consistent with the absence of diabetes  5.7-6.4%  Consistent with increasing risk for diabetes   (prediabetes)  >or=6.5%  Consistent with diabetes  Currently no consensus exists for use of hemoglobin A1C  for diagnosis of diabetes for children.         How to Check Your Feet    Below are tips to help you look for foot problems. Try to check your feet at the same time each day, such as when you get out of bed in the morning.    · Check the top of each foot. The tops of toes, back of the heel, and outer edge of the foot can get a lot of rubbing from poor-fitting shoes.    · Check the bottom of each foot. Daily wear and tear often leads to problems at pressure spots.    · Check the toes and nails. Fungal infections often occur between toes. Toenail problems can also be a sign of fungal infections or lead to breaks in the skin.    · Check your shoes, too. Loose objects inside a shoe can injure the foot. Use your hand to feel inside your shoes for things like alie, loose stitching, or rough areas that could irritate your skin.        Diabetic Foot Care    Diabetes can lead to a number of different foot complications. Fortunately, most of these complications can be prevented with a little extra foot care. If diabetes is not well controlled, the high blood sugar can cause damage to blood vessels and result in poor circulation to the foot. When the skin does not get enough blood flow, it becomes prone to pressure sores and ulcers, which heal slowly.  High blood sugar can also damage nerves, interfering with the ability to feel pain and pressure. When you cant feel your foot normally, it is easy to injure your skin, bones and joints without knowing it. For these reasons diabetes increases the risk of fungal infections, bunions and ulcers. Deep ulcers can lead to bone infection. Gangrene is the most serious foot complication of diabetes. It  usually occurs on the tips of the toes as blacked areas of skin. The black area is dead tissue. In severe cases, gangrene spreads to involve the entire toe, other toes and the entire foot. Foot or toe amputation may be required. Good foot care and blood sugar control can prevent this.    Home Care  1. Wear comfortable, proper fitting shoes.  2. Wash your feet daily with warm water and mild soap.  3. After drying, apply a moisturizing cream or lotion.  4. Check your feet daily for skin breaks, blisters, swelling, or redness. Look between your toes also.  5. Wear cotton socks and change them every day.  6. Trim toe nails carefully and do not cut your cuticles.  7. Strive to keep your blood sugar under control with a combination of medicines, diet and activity.  8. If you smoke and have diabetes, it is very important that you stop. Smoking reduces blood flow to your foot.  9. Avoid activities that increase your risk of foot injury:  · Do not walk barefoot.  · Do not use heating pads or hot water bottles on your feet.  · Do not put your foot in a hot tub without first checking the temperature with your hand.  10) Schedule yearly foot exams.    Follow Up  with your doctor or as advised by our staff. Report any cut, puncture, scrape, other injury, blister, ingrown toenail or ulcer on your foot.    Get Prompt Medical Attention  if any of the following occur:  -- Open ulcer with pus draining from the wound  -- Increasing foot or leg pain  -- New areas of redness or swelling or tender areas of the foot    © 5000-2487 [employer]. [address] [place] [address], [place] [address]. All rights reserved. This information is not intended as a substitute for professional medical care. Always follow your healthcare professional's instructions.

## 2017-10-12 DIAGNOSIS — M25.562 PAIN IN BOTH KNEES, UNSPECIFIED CHRONICITY: Primary | ICD-10-CM

## 2017-10-12 DIAGNOSIS — M25.561 PAIN IN BOTH KNEES, UNSPECIFIED CHRONICITY: Primary | ICD-10-CM

## 2017-10-16 ENCOUNTER — TELEPHONE (OUTPATIENT)
Dept: BARIATRICS | Facility: CLINIC | Age: 52
End: 2017-10-16
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
## 2017-10-17 ENCOUNTER — INITIAL CONSULT (OUTPATIENT)
Dept: BARIATRICS | Facility: CLINIC | Age: 52
End: 2017-10-17
Payer: MEDICARE

## 2017-10-17 VITALS
DIASTOLIC BLOOD PRESSURE: 78 MMHG | SYSTOLIC BLOOD PRESSURE: 121 MMHG | HEIGHT: 65 IN | BODY MASS INDEX: 48.82 KG/M2 | HEART RATE: 85 BPM | WEIGHT: 293 LBS

## 2017-10-17 DIAGNOSIS — M47.816 FACET ARTHRITIS OF LUMBAR REGION: ICD-10-CM

## 2017-10-17 DIAGNOSIS — E78.5 HYPERLIPIDEMIA, UNSPECIFIED HYPERLIPIDEMIA TYPE: ICD-10-CM

## 2017-10-17 DIAGNOSIS — M17.0 PRIMARY OSTEOARTHRITIS OF BOTH KNEES: ICD-10-CM

## 2017-10-17 DIAGNOSIS — E66.01 MORBID OBESITY WITH BMI OF 50.0-59.9, ADULT: Primary | ICD-10-CM

## 2017-10-17 DIAGNOSIS — G47.30 SLEEP APNEA, UNSPECIFIED TYPE: ICD-10-CM

## 2017-10-17 DIAGNOSIS — F11.90 CHRONIC, CONTINUOUS USE OF OPIOIDS: ICD-10-CM

## 2017-10-17 PROCEDURE — 99213 OFFICE O/P EST LOW 20 MIN: CPT | Mod: PBBFAC | Performed by: INTERNAL MEDICINE

## 2017-10-17 PROCEDURE — 99999 PR PBB SHADOW E&M-EST. PATIENT-LVL III: CPT | Mod: PBBFAC,,, | Performed by: INTERNAL MEDICINE

## 2017-10-17 PROCEDURE — 99215 OFFICE O/P EST HI 40 MIN: CPT | Mod: S$PBB,,, | Performed by: INTERNAL MEDICINE

## 2017-10-17 RX ORDER — DIETHYLPROPION HYDROCHLORIDE 75 MG/1
75 TABLET, EXTENDED RELEASE ORAL DAILY
Qty: 30 TABLET | Refills: 2 | Status: SHIPPED | OUTPATIENT
Start: 2017-10-17 | End: 2017-12-18

## 2017-10-17 NOTE — LETTER
October 19, 2017      Clarisse Richardson MD  [address]  [facility] [address]           [facility] Bariatric Surgery  [address]  [facility] [address]  Phone: [phone]  Fax: [phone]          Patient: Alivia Thomas   MR Number: [id number]   YOB: 1965   Date of Visit: 10/17/2017       Dear Dr. Clarisse Richardson:    Thank you for referring Alivia Thomas to me for evaluation. Attached you will find relevant portions of my assessment and plan of care.    If you have questions, please do not hesitate to call me. I look forward to following Alivia Thomas along with you.    Sincerely,    Binu Ruth MA    Enclosure  CC:  No Recipients    If you would like to receive this communication electronically, please contact [email or web address] or [phone] to request more information on [employer] Link access.    For providers and/or their staff who would like to refer a patient to [facility], please contact us through our one-stop-shop provider referral line, [facility], at [phone].    If you feel you have received this communication in error or would no longer like to receive these types of communications, please e-mail [email or web address]

## 2017-10-17 NOTE — PATIENT INSTRUCTIONS
- To lose weight you want to cut 100% starchy carbohydrates out of your diet (bread, rice, pasta, potatoes, granola, flour, corn, peas, oatmeal, grits, tortillas, crackers, chips) and get  grams of protein.  Aim for 100 grams of protein daily.    - No soda, sweet tea, juices, sports drinks or lemonade     -Exercise daily    - Premier Protein (Chocolate, Bananas & Cream, Strawberries & Cream, Vanilla) Ofe or [employer]co    - Syntrax [place] from Vitamin Shoppe, www.bariatricadvantage.com, www.bariatricchoice.com. (LACTOSE FREE)    - Atkins Lift - [employer] & Ofe (LACTOSE FREE)    - Veggetti Pro from ApplyInc.com, Amazon, Bed Bath & Beyond    - www.pinterest.com (cauliflower, cloud bread, quest bar cookies, eggplant, zucchini, zucchini noodles, crustless quiche, no carb meals, taco lettuce boats)    - http://theworldaccordingtoeggface.[email or web address]/      Exercise 30 minutes 4 times a week.     Check blood sugar regularly as medications may need to be adjusted with changes in diet and weight loss. Call Clarisse Richardson MD with readings      Patient warned of common side effects of diethylpropion including anxiety, insomnia, palpitations and increased blood pressure. It was also explained that it is for short-term usage along with diet and exercise, and that stopping the medication without making lifestyle changes will result in regain of weight. Patient states understanding.    Weight loss medications are controlled substances.  They require routine follow up. Prescription or pills that are lost or destroyed will not be replaced.           Sample meal plan  80-120g protein; 6252-6023 calories  Protein drinks and bars: 0-4 grams sugar  Drink lots of water throughout the day and exercise!  MENU # 1  Breakfast: 2 eggs, 1 turkey sausage tirso  Lunch: 2-3 roll-ups (sliced turkey, low-fat slice cheese), baby carrots dipped in 1 Tbsp natural peanut butter  Mid-Day Snack: ¼ cup unsalted almonds, ½ cup fruit  Supper: 1 chicken  thigh simmered in tomato sauce + 2 Tbsp mozzarella cheese, ½ cup black beans, 1/2 cup steamed veggies  Evening Snack: 1/2 cup grapes with 4 cubes low-fat cheddar cheese   ___________________________________________________  MENU # 2  Breakfast: protein drink  Mid-morning snack : ¼ cup unsalted nuts  Lunch: 1 cup tuna or chicken salad made with light bravo, over salad.   Supper: hamburger tirso, slice of cheese, 1 cup steamed veggies.   Snack: light yogurt      Menu #3  Breakfast: 6oz plain Greek yogurt + fruit (½ banana, ½ cup fruit - pineapple, blueberries, strawberries, peach), may add Splenda to [place].  Lunch: ½  chicken breast w/ slice pepper pollo cheese, 1/2 cup steamed veggies and small salad with Salad Spritzer.    Mid-Day snack: protein drink   Supper: 4oz Grilled fish, grilled veggie kabob ( mushrooms, onion, bell pepper, yellow squash, zucchini, cherry tomatoes)  Evening Snack: fudgsicle no-sugar-added    Menu # 4  Breakfast: vanilla iced coffee: 1 scoop vanilla protein powder + 4oz skim milk + 4oz coffee   Snack: protein bar  Lunch: 2 Lettuce tacos: ¼ cup seasoned ground turkey wrapped in a Valeriy lettuce leaf with 1 Tbsp shredded cheese and dollop low-fat sour cream  Dinner: Shrimp omelet: 2 eggs, ½ cup shrimp, green onions, and shredded cheese        Menu #5  Breakfast: 1 cup low-fat cottage cheese, ½ cup peaches (no sugar added)  Lunch: 2 oz baked pork chop, 1 cup okra and tomato stew  Snack: 1 cup black beans + salsa + dollop sour cream  Dinner: Caprese chicken salad: 2 oz chicken, 1oz fresh mozzarella, sliced tomato, 1 Tbsp olive oil, basil  Snack: sugar-free pudding cup      Menu #6  Breakfast: ½ cup part-skim ricotta cheese, 2 Tbsp sugar-free strawberry preserves, ¼ cup slivered almonds  Lunch: 2 oz canned chicken, 1oz shredded cheddar cheese, ½ cup black beans, 2 Tbsp salsa  Snack: Protein drink  Dinner: 4 oz grilled salmon steak, over mixed green salad with light dressing          1200- 1500  calorie Sample meal plan   80-120g protein per day.   Protein drinks and bars: 0-4 grams sugar   Drink lots of water throughout the day and exercise!   MENU # 1   Breakfast: 2 eggs, 1 turkey sausage tirso, 1 apple   Snack: P3 protein pack  Lunch: 2 roll-ups (sliced turkey, low-fat sliced cheese, mustard), 12 baby carrots dipped in 1 Tbsp natural peanut butter   Mid-Day Snack: ¼ cup unsalted almonds, ½ cup fruit   Dinner: 1 chicken thigh simmered in tomato sauce + 2 Tbsp mozzarella cheese, ½ cup black beans, 1/2 cup steamed carrots   Evening Snack: 1/2 cup grapes with 4 cubes low-fat cheddar cheese   ___________________________________________________   MENU # 2   Breakfast: 200 calorie protein drink   Mid-morning snack : ¼ cup unsalted nuts, medium banana   Lunch: 3oz tuna or chicken salad made with 2 tbsp light bravo, over salad with tomatoes and cucumbers.   Snack: low-fat cheese stick   Dinner: 3oz hamburger tirso, slice of low-fat cheese, 1 cup yellow squash and zucchini sauteed in nonstick cookspray  Snack: 6oz light yogurt   _______________________________________________________   Menu #3   Breakfast: 6oz plain Greek yogurt + fruit (½ banana OR ½ cup fruit - pineapple, blueberries, strawberries, peach), may add Splenda to [place].   Lunch: Grilled chicken breast w/ slice low-fat pepper pollo cheese, 1/2 cup grilled/baked asparagus and small salad with Salad Spritzer.   Mid-Day snack: 200 calorie protein drink   Dinner: 4oz Grilled fish, ½ cup white beans, ½ cup cooked spinach   Evening Snack: fudgsicle no-sugar-added   Menu # 4   Breakfast: 1 scoop vanilla protein powder + 4oz skim milk + 4oz coffee   Snack: Pure protein bar   Lunch: 2 Lettuce tacos: 3oz seasoned ground turkey wrapped in a Valeriy lettuce leaves with 1 Tbsp shredded cheese and dollop low-fat sour cream   Snack: ½ cup cottage cheese, ½ cup pineapple chunks   Dinner: Shrimp omelet: 2 eggs, ½ cup shrimp, green onions, and shredded cheese    ______________________________________________________   Menu #5   Breakfast: 1 cup low-fat cottage cheese, ½ cup peaches (no sugar added)   Snack: 1 apple, 4 cubes cheese   Lunch: 3oz baked pork chop, 1 cup okra and tomato stew   Snack: 1/2 cup black beans + salsa + dollop light sour cream   Dinner: Caprese chicken salad: 3 oz chicken breast, 1oz fresh mozzarella, sliced tomato, 1 Tbsp olive oil, basil   Snack: sugar-free popsicle   _______________________________________________________  Menu #6   Breakfast: ½ cup part-skim ricotta cheese, 2 Tbsp sugar-free strawberry preserves, sprinkle of slivered almonds   Snack: 1 orange + string cheese  Lunch: 3 oz canned chicken, 1oz shredded cheddar cheese, ½ cup black beans, 2 Tbsp salsa   Snack: 200 calorie Protein drink   Dinner: 4 oz grilled salmon steak, over mixed green salad with 2 tbsp light dressing   _______________________________________________________  Menu #7  Breakfast: crust-less quiche (bake 1 egg mixed w/ low fat cheese, broccoli and low sodium ham in a muffin cup until cooked inside)  Snack: 1 light yogurt  Lunch: protein shake (1 scoop powder + 8 oz skim milk, blended w/ ice)  Snack: small apple w/ 2 tbsp PB2 (powdered peanut butter)  Dinner: 2 Turkey meatballs w/ low sugar marinara sauce, 1/2 cup spiralized zucchini (sauteed on stove top w/ nonstick cookspray)        Meal Ideas for Regular Bariatric Diet  *Recipes and products available at www.bariatriceating.com      Breakfast: (15-20g protein)    - Egg white omelet: 2 egg whites or ½ cup Egg Beaters. (Optional proteins: cheese, shrimp, black beans, chicken, sliced turkey) (Optional veggies: tomatoes, salsa, spinach, mushrooms, onions, green peppers, or small slice avocado)     - Egg and sausage: 1 egg or ¼ cup Egg Beaters (any variety), with 1 tirso or 2 links of Turkey sausage or Veggie breakfast sausage (Wh[employer] or Autosprite)    - Crust-less breakfast quiche: To make a glass pie dish, mix  4oz part skim Ricotta, 1 cup skim milk, and 2 eggs as your base. Add protein: shredded cheese, sliced lean ham or turkey, turkey weiss/sausage. Add veggies: tomato, onion, green onion, mushroom, green pepper, spinach, etc.    - Yogurt parfait: Mix 1 - 6oz container Dannon Light N Fit vanilla yogurt, with ¼ cup Kashi Go Lean cereal    - Cottage cheese and fruit: ½ cup part-skim cottage cheese or ricotta cheese topped with fresh fruit or sugar free preserves     - Mera Krishna's Vanilla Egg custard* (add 2 Tbsp instant coffee granules to make Cappuccino Custard*)    - Hi-Protein café latte (skim milk, decaf coffee, 1 scoop protein powder). Optional to add Sugar free syrup or extract flavoring.    Lunch: (20-30g protein)    - ½ cup Black bean soup (Homemade or Progresso), with ¼ cup shredded low-fat cheese. Top with chopped tomato or fresh salsa.     - Lean deli turkey breast and low-fat sliced cheese, mustard or light bravo to moisten, rolled up together, or wrapped in a Valeriy lettuce leaf    - Chicken salad made from dinner leftovers, moisten with low-fat salad dressing or light bravo. Also try leftover salmon, shrimp, tuna or boiled eggs. Serve ½ cup over dark green salad    - Fat-free canned refried beans, topped with ¼ cup shredded low-fat cheese. Top with chopped tomato or fresh salsa.     - Greek salad: Top mixed greens with 1-2oz grilled chicken, tomatoes, red onions, 2-3 kalamata olives, and sprinkle lightly with feta cheese. Spritz with Balsamic vinegar to taste.     - Crust-less lunch quiche: To make a glass pie dish, mix 4oz part skim Ricotta, 1 cup skim milk, and 2 eggs as your base. Add protein: shredded cheese, sliced lean ham or turkey, shrimp, chicken. Add veggies: tomato, onion, green onion, mushroom, green pepper, spinach, artichoke, broccoli, etc.    - Pizza bake: tomato sauce, low-fat shredded mozzarella and turkey pepperoni or [place] weiss. Add any veggies.    - Cucumber crab bites: Spread ¼ cup  crab dip (lump crabmeat + light cream cheese and green onions) over sliced cucumber.     - Chicken with light spinach and artichoke dip*: Puree in : 6oz cooked and drained spinach, 2 cloves garlic, 1 can cannelloni beans, ½ cup chopped green onions, 1 can drained artichoke hearts (not marinated in oil), lemon juice and basil. Mix in 2oz chopped up chicken.    Supper: (20-30g protein)    - Serve grilled fish over dark green salad tossed with low-fat dressing, served with grilled asparagus sevilla     - Rotisserie chicken salad: served with sliced strawberries, walnuts, fat-free feta cheese crumbles and 1 tbsp Andrades Own Light Raspberry [place] Vinaigrette    - Shrimp cocktail: Dip cold boiled shrimp in homemade low-sugar cocktail sauce (1/2 cup Keara One Carb ketchup, 2 tbsp horseradish, 1/4 tsp hot sauce, 1 tsp Worcestershire sauce, 1 tbsp freshly-squeezed lemon juice). Serve with dark green salad, walnuts, and crumbled blue cheese drizzled with olive oil and Balsamic vinegar    - Tuna Melt: Spread tuna salad onto 2 thick slices of tomato. Top with low-fat cheese and broil until cheese is melted. May also be made with chicken salad of shrimp salad. [place] with different types of cheeses.    - Homemade low-fat Chili using extra lean ground beef or ground turkey. Top with shredded cheese and salsa as desired. May add dollop fat-free sour cream if desired    - Dinner Omelet with shrimp or chicken and onion, green peppers and chives.    - No noodle lasagna: Use sliced zucchini or eggplant in place of noodles.  Layer with part skim ricotta cheese and low sugar meat sauce (use very lean ground beef or ground turkey).    - Mexican chicken bake: Bake chunks of chicken breast or thigh with taco seasoning, Pace brand enchilada sauce, green onions and low-fat cheese. Serve with ¼ cup black beans or fat free refried beans topped with chopped tomatoes or salsa.    - Johnny frozen meatballs, simmered in  Classico Marinara sauce. Different flavors of salsa or spaghetti sauce create different dishes! Sprinkle with parmesan cheese. Serve with grilled or steamed veggies, or a dark green salad.    - Simmer boneless skinless chicken thigh chunks in Classico Marinara sauce or roasted salsa until tender with chopped onion, bell pepper, garlic, mushrooms, spinach, etc.     - Hamburger, without the bun, dressed the way you like. Served with grilled or steamed veggies.    - Eggplant parmesan: Bake slices of eggplant at 350 degrees for 15 minutes. Layer tomato sauce, sliced eggplant and low-fat mozzarella cheese in a baking dish and cover with foil. Bake 30-40 more minutes or until bubbly. Uncover and bake at 400 degrees for about 15 more minutes, or until top is slightly crisp.    - Fish tacos: grilled/baked white fish, wrapped in Valeriy lettuce leaf, topped with salsa, shredded low-fat cheese, and light coleslaw.    Snacks: (100-200 calories; >5g protein)    - 1 low-fat cheese stick with 8 cherry tomatoes or 1 serving fresh fruit  - 4 thin slices fat-free turkey breast and 1 slice low-fat cheese  - 4 thin slices fat-free honey ham with wedge of melon  - 1/4 cup unsalted nuts with ½ cup fruit  - 6-oz container Dannon Light n Fit vanilla yogurt, topped with 1oz unsalted nuts         - apple, celery or baby carrots spread with 2 Tbsp natural peanut butter or almond butter   - apple slices with 1 oz slice low-fat cheese  - celery, cucumber, bell pepper or baby carrots dipped in ¼ cup hummus bean spread or light spinach and artichoke dip (*recipe in lunch section)  - 100 calorie bag microwave light popcorn with 3 tbsp grated parmesan cheese  - Catalino Links Beef Steak - 14g protein! (similar to beef jerky)  - 2 wedges [place] - Light Herb & Garlic Cheese with sliced cucumber or green bell pepper  - 1/2 cup low-fat cottage cheese with ¼ cup fruit or ¼ cup salsa  - RTD Protein drinks: Atkins, Low Carb Slim Fast, EAS light,  Muscle Milk Light, etc.  - Homemade Protein drinks: [facility] Soy95, Isopure, Nectar, UNJURY, Whey Gourmet, etc. Mix 1 scoop powder with 8oz skim/1% milk or light soymilk.  - Protein bars: Atkins, EAS, Pure Protein, Think Thin, Detour, etc. Must have 0-4 grams sugar - Read the label.    Takeout Options: No more than twice/week  Deli - Salads (no pasta or rice), meats, cheeses. Roasted chicken. Lox (salmon)    Mexican - Platters which don't include tortillas, chips, or rice. Go easy on the beans. Example: Fajitas without the tortillas. Ask the  not to bring chips to the table if they are too tempting.    Greek - Meat or fish and vegetable, but no bread or rice. Including hummus, baba ganoush, etc, is OK. Most sit-down Greek restaurants can provide you with cucumber slices for dipping instead of jaye bread.    Fast Food (Avoid as much as possible) - Salads (no croutons and limit salad dressing to 2 tbsp), grilled chicken sandwich without the bun and ask for no bravo. Zuleikas low fat chili or Taco Bell pintos and cheese.    BBQ - The meats are fine if you ask for sauces on the side, but most of the traditional side dishes are loaded with carbs. Shubham slaw, baked beans and BBQ sauce are typically made with sugar.    Chinese - Nothing deep-fried, no rice or noodles. Many Chinese sauces have starch and sugar in them, so you'll have to use your judgement. If you find that these sauces trigger cravings, or cause Dumping, you can ask for the sauce to be made without sugar or just use soy sauce.

## 2017-11-06 DIAGNOSIS — M47.899 FACET SYNDROME: ICD-10-CM

## 2017-11-06 DIAGNOSIS — M19.90 OSTEOARTHRITIS, UNSPECIFIED OSTEOARTHRITIS TYPE, UNSPECIFIED SITE: ICD-10-CM

## 2017-11-06 RX ORDER — OXYCODONE AND ACETAMINOPHEN 10; 325 MG/1; MG/1
1 TABLET ORAL EVERY 6 HOURS PRN
Qty: 120 TABLET | Refills: 0 | Status: SHIPPED | OUTPATIENT
Start: 2017-11-06 | End: 2017-12-06 | Stop reason: SDUPTHER

## 2017-11-06 NOTE — TELEPHONE ENCOUNTER
----- Message from Dot Nava sent at 11/6/2017  9:36 AM CST -----      _X 1st Request  _  2nd Request  _  3rd Request    Please refill the medication(s) listed below. Please call the patient when the prescription(s) is ready for  at this phone number [phone].          Medication #1  oxycodone-acetaminophen (PERCOCET)  mg per tablet 120 tablet 0 10/6/2017  --  Sig - Route: Take 1 tablet by mouth every 6 (six) hours as needed for Pain. - Oral  Class: Normal

## 2017-11-06 NOTE — TELEPHONE ENCOUNTER
Last office visit:8/25/17  Next appointment:11/20/17  Last filled via :10/06/17  Last UDS:8/2517

## 2017-11-06 NOTE — TELEPHONE ENCOUNTER
Staff contacted the patient to inform her that her prescription had been sent to her preferred pharmacy [facility].      Patient verbalized understanding and expressed great thanks.

## 2017-11-15 ENCOUNTER — HOSPITAL ENCOUNTER (EMERGENCY)
Facility: HOSPITAL | Age: 52
Discharge: HOME | End: 2017-11-15
Attending: EMERGENCY MEDICINE
Payer: MEDICARE

## 2017-11-15 VITALS
DIASTOLIC BLOOD PRESSURE: 104 MMHG | BODY MASS INDEX: 48.82 KG/M2 | HEART RATE: 89 BPM | TEMPERATURE: 99 F | OXYGEN SATURATION: 96 % | SYSTOLIC BLOOD PRESSURE: 177 MMHG | RESPIRATION RATE: 18 BRPM | WEIGHT: 293 LBS | HEIGHT: 65 IN

## 2017-11-15 DIAGNOSIS — H66.92 LEFT OTITIS MEDIA, UNSPECIFIED OTITIS MEDIA TYPE: Primary | ICD-10-CM

## 2017-11-15 PROCEDURE — 99284 EMERGENCY DEPT VISIT MOD MDM: CPT | Mod: ,,, | Performed by: PHYSICIAN ASSISTANT

## 2017-11-15 PROCEDURE — 99283 EMERGENCY DEPT VISIT LOW MDM: CPT | Mod: 25

## 2017-11-15 PROCEDURE — 96372 THER/PROPH/DIAG INJ SC/IM: CPT

## 2017-11-15 PROCEDURE — 25000003 PHARM REV CODE 250: Performed by: PHYSICIAN ASSISTANT

## 2017-11-15 PROCEDURE — 63600175 PHARM REV CODE 636 W HCPCS: Performed by: PHYSICIAN ASSISTANT

## 2017-11-15 RX ORDER — AMOXICILLIN AND CLAVULANATE POTASSIUM 875; 125 MG/1; MG/1
1 TABLET, FILM COATED ORAL 2 TIMES DAILY
Qty: 14 TABLET | Refills: 0 | Status: SHIPPED | OUTPATIENT
Start: 2017-11-15 | End: 2017-12-18

## 2017-11-15 RX ORDER — AMOXICILLIN AND CLAVULANATE POTASSIUM 875; 125 MG/1; MG/1
1 TABLET, FILM COATED ORAL
Status: COMPLETED | OUTPATIENT
Start: 2017-11-15 | End: 2017-11-15

## 2017-11-15 RX ORDER — ACETAMINOPHEN 325 MG/1
650 TABLET ORAL
Status: DISCONTINUED | OUTPATIENT
Start: 2017-11-15 | End: 2017-11-15

## 2017-11-15 RX ORDER — KETOROLAC TROMETHAMINE 30 MG/ML
30 INJECTION, SOLUTION INTRAMUSCULAR; INTRAVENOUS
Status: COMPLETED | OUTPATIENT
Start: 2017-11-15 | End: 2017-11-15

## 2017-11-15 RX ORDER — IBUPROFEN 600 MG/1
600 TABLET ORAL
Status: DISCONTINUED | OUTPATIENT
Start: 2017-11-15 | End: 2017-11-15

## 2017-11-15 RX ADMIN — AMOXICILLIN AND CLAVULANATE POTASSIUM 1 TABLET: 875; 125 TABLET, FILM COATED ORAL at 06:11

## 2017-11-15 RX ADMIN — KETOROLAC TROMETHAMINE 30 MG: 30 INJECTION, SOLUTION INTRAMUSCULAR at 06:11

## 2017-11-15 NOTE — ED PROVIDER NOTES
Encounter Date: 11/15/2017    SCRIBE #1 NOTE: I, Ramiro Pena, am scribing for, and in the presence of,  Dr. Dumont. I have scribed the following portions of the note - the APC attestation.       History     Chief Complaint   Patient presents with    Otalgia     pain in left ear, denies drainage, pain radiates down to left face and jaw. denies dental pain .      51-year-old -American female past medical history of diabetes, hyperlipidemia, DJD, asthma presents to the ED complaining of left otalgia since yesterday.  She endorses associated sore throat, left facial pain, headache, cough.  She denies any sick contacts.  She has not taken any over-the-counter medications for her symptoms.  She denies fever, chills, chest pain, shortness of breath, rhinorrhea, postnasal drip, trauma, dental pain.  She occasionally drinks alcohol and denies tobacco or drug use.      The history is provided by the patient.     Review of patient's allergies indicates:  No Known Allergies  Past Medical History:   Diagnosis Date    Asthma     Diabetes mellitus type II     DJD (degenerative joint disease) of knee 6/19/2014    Hyperlipidemia     Morbid obesity     Sleep apnea      Past Surgical History:   Procedure Laterality Date    CARPAL TUNNEL RELEASE      CARPAL TUNNEL RELEASE  1980s    left    CARPAL TUNNEL RELEASE  2012    right    JOINT REPLACEMENT Bilateral     with 2 revisions on rt    KNEE SURGERY  3/2010    orthroscope    KNEE SURGERY  6-19-14    left TKR     Family History   Problem Relation Age of Onset    Diabetes Mother     Hypertension Mother     Cataracts Mother     Diabetes Father     Cataracts Father     Coronary artery disease Brother     Amblyopia Neg Hx     Blindness Neg Hx     Cancer Neg Hx     Glaucoma Neg Hx     Macular degeneration Neg Hx     Retinal detachment Neg Hx     Strabismus Neg Hx     Stroke Neg Hx     Thyroid disease Neg Hx      Social History   Substance Use Topics     Smoking status: Never Smoker    Smokeless tobacco: Never Used    Alcohol use Yes      Comment: occasionally      Review of Systems   Constitutional: Negative for chills and fever.   HENT: Positive for congestion, ear pain and sore throat. Negative for facial swelling and sneezing.    Eyes: Negative for photophobia and visual disturbance.   Respiratory: Positive for cough. Negative for shortness of breath.    Cardiovascular: Negative for chest pain.   Gastrointestinal: Negative for abdominal pain, constipation, diarrhea, nausea and vomiting.   Genitourinary: Negative for dysuria and hematuria.   Musculoskeletal: Negative for back pain, neck pain and neck stiffness.   Skin: Negative for rash and wound.   Neurological: Positive for headaches. Negative for dizziness, syncope, weakness and numbness.   Psychiatric/Behavioral: Negative for confusion.       Physical Exam     Initial Vitals [11/15/17 1612]   BP Pulse Resp Temp SpO2   (!) 177/104 89 18 98.6 °F (37 °C) 96 %      MAP       128.33         Physical Exam    Nursing note and vitals reviewed.  Constitutional: She appears well-developed and well-nourished. She is not diaphoretic. She appears distressed (secondary to pain).   HENT:   Head: Normocephalic and atraumatic.   Right Ear: Tympanic membrane, external ear and ear canal normal.   Left Ear: External ear and ear canal normal. There is tenderness. No mastoid tenderness. Tympanic membrane is erythematous.   Mouth/Throat: Uvula is midline and mucous membranes are normal. Posterior oropharyngeal edema present.   Neck: Normal range of motion. Neck supple.   Cardiovascular: Normal rate, regular rhythm and normal heart sounds. Exam reveals no gallop and no friction rub.    No murmur heard.  Pulmonary/Chest: Breath sounds normal. She has no wheezes. She has no rhonchi. She has no rales.   Abdominal: Soft. Bowel sounds are normal. There is no tenderness. There is no rebound and no guarding.   Musculoskeletal: Normal  range of motion.   Neurological: She is alert and oriented to person, place, and time.   Skin: Skin is warm and dry. No rash noted. No erythema.   Psychiatric: She has a normal mood and affect.         ED Course   Procedures  Labs Reviewed - No data to display          Medical Decision Making:   History:   Old Medical Records: I decided to obtain old medical records.       APC / Resident Notes:   51-year-old -American female past medical history of diabetes, hyperlipidemia, DJD, asthma presents to the ED complaining of left otalgia since yesterday.  VSS. Lungs CTA. Abdomen soft. Posterior oropharyngeal edema noted. L erythematous TM noted. Tenderness with movement of the L ear. No mastoid tenderness. Will treat for AOM.     She was given IM toradol in the ED.    She was discharged with a prescription for augmentin.  She is on celebrex and percocet at home - will continue for pain control.  She will follow up with her PCP.  All of the patient's questions were answered.  I reviewed the patient's chart and discussed the case with my supervising physician.          Scribe Attestation:   Scribe #1: I performed the above scribed service and the documentation accurately describes the services I performed. I attest to the accuracy of the note.    Attending Attestation:     Physician Attestation Statement for NP/PA:   I have conducted a face to face encounter with this patient in addition to the NP/PA, due to Medical Complexity    Other NP/PA Attestation Additions:    History of Present Illness: Pt with left otalgia and erythematous TM. Will treat for acute otitis media with antibiotics and pain killers                    ED Course      Clinical Impression:   The encounter diagnosis was Left otitis media, unspecified otitis media type.    Disposition:   Disposition: Discharged  Condition: Stable                        Jessie Dockery PA-C  11/15/17 [id number]

## 2017-11-16 ENCOUNTER — PROCEDURE VISIT (OUTPATIENT)
Dept: OBSTETRICS AND GYNECOLOGY | Facility: CLINIC | Age: 52
End: 2017-11-16
Payer: MEDICARE

## 2017-11-16 VITALS
SYSTOLIC BLOOD PRESSURE: 120 MMHG | WEIGHT: 293 LBS | HEIGHT: 65 IN | BODY MASS INDEX: 48.82 KG/M2 | DIASTOLIC BLOOD PRESSURE: 70 MMHG

## 2017-11-16 DIAGNOSIS — N93.8 DUB (DYSFUNCTIONAL UTERINE BLEEDING): Primary | ICD-10-CM

## 2017-11-16 DIAGNOSIS — E78.5 HYPERLIPIDEMIA, UNSPECIFIED HYPERLIPIDEMIA TYPE: ICD-10-CM

## 2017-11-16 LAB
B-HCG UR QL: NEGATIVE
CTP QC/QA: YES

## 2017-11-16 PROCEDURE — 81025 URINE PREGNANCY TEST: CPT | Mod: PBBFAC | Performed by: OBSTETRICS & GYNECOLOGY

## 2017-11-16 PROCEDURE — 88305 TISSUE EXAM BY PATHOLOGIST: CPT

## 2017-11-16 PROCEDURE — 88305 TISSUE EXAM BY PATHOLOGIST: CPT | Mod: 26,,,

## 2017-11-16 PROCEDURE — 58100 BIOPSY OF UTERUS LINING: CPT | Mod: PBBFAC | Performed by: OBSTETRICS & GYNECOLOGY

## 2017-11-16 RX ORDER — FLUCONAZOLE 150 MG/1
TABLET ORAL
COMMUNITY
Start: 2017-10-02 | End: 2018-03-06 | Stop reason: SDUPTHER

## 2017-11-16 RX ORDER — FLUCONAZOLE 150 MG/1
150 TABLET ORAL ONCE
Qty: 3 TABLET | Refills: 0 | Status: SHIPPED | OUTPATIENT
Start: 2017-11-16 | End: 2017-11-16

## 2017-11-16 NOTE — PROCEDURES
Biopsy (Gynecological)  Date/Time: 11/16/2017 8:44 AM  Performed by: OTIS MERCADO  Authorized by: OTIS MERCADO     Consent Done?:  Yes (Written)   Patient was prepped and draped in the normal sterile fashion.  Local anesthesia used?: No      Biopsy Location:  Uterus  Estimated blood loss (cc):  10   Patient tolerated the procedure well with no immediate complications.     Pelvic rest for 2 weeks. Bleeding, pain and fever precautions given.

## 2017-11-17 DIAGNOSIS — E11.9 TYPE 2 DIABETES MELLITUS WITHOUT COMPLICATION: ICD-10-CM

## 2017-11-20 ENCOUNTER — OFFICE VISIT (OUTPATIENT)
Dept: PAIN MEDICINE | Facility: CLINIC | Age: 52
End: 2017-11-20
Payer: MEDICARE

## 2017-11-20 VITALS
WEIGHT: 293 LBS | DIASTOLIC BLOOD PRESSURE: 83 MMHG | SYSTOLIC BLOOD PRESSURE: 129 MMHG | HEART RATE: 64 BPM | BODY MASS INDEX: 57.96 KG/M2 | TEMPERATURE: 98 F

## 2017-11-20 DIAGNOSIS — Z79.891 ENCOUNTER FOR LONG-TERM OPIATE ANALGESIC USE: ICD-10-CM

## 2017-11-20 DIAGNOSIS — M25.562 BILATERAL CHRONIC KNEE PAIN: ICD-10-CM

## 2017-11-20 DIAGNOSIS — G89.4 CHRONIC PAIN DISORDER: ICD-10-CM

## 2017-11-20 DIAGNOSIS — Z96.651 STATUS POST TOTAL RIGHT KNEE REPLACEMENT: ICD-10-CM

## 2017-11-20 DIAGNOSIS — M51.36 DDD (DEGENERATIVE DISC DISEASE), LUMBAR: ICD-10-CM

## 2017-11-20 DIAGNOSIS — M47.816 FACET ARTHRITIS OF LUMBAR REGION: Primary | ICD-10-CM

## 2017-11-20 DIAGNOSIS — M25.561 BILATERAL CHRONIC KNEE PAIN: ICD-10-CM

## 2017-11-20 DIAGNOSIS — M47.816 LUMBAR SPONDYLOSIS: ICD-10-CM

## 2017-11-20 DIAGNOSIS — G89.29 BILATERAL CHRONIC KNEE PAIN: ICD-10-CM

## 2017-11-20 PROCEDURE — 99213 OFFICE O/P EST LOW 20 MIN: CPT | Mod: PBBFAC | Performed by: NURSE PRACTITIONER

## 2017-11-20 PROCEDURE — 99214 OFFICE O/P EST MOD 30 MIN: CPT | Mod: S$PBB,,, | Performed by: NURSE PRACTITIONER

## 2017-11-20 PROCEDURE — 99999 PR PBB SHADOW E&M-EST. PATIENT-LVL III: CPT | Mod: PBBFAC,,, | Performed by: NURSE PRACTITIONER

## 2017-11-20 NOTE — PROGRESS NOTES
Subjective:      Patient ID: Alivia Thomas is a 51 y.o. female.    Chief Complaint: Low-back Pain and Leg Pain    Referred by: No ref. provider found     Interval History 11/20/2017:  The patient returns to clinic today for follow up. She continues to report bilateral knee pain. She reports increased low back pain. She describes this pain as aching and constant. She denies any radiating leg pain. She reports that the recent cold weather change has increased her pain. She continues to take Percocet as needed for pain with benefit. She denies any adverse effects. She denies any bowel or bladder incontinence. She reports that she was recently diagnosed with an ear infection and is currently on antibiotics. Her pain today is 8/10.    Interval History 8/25/2017:  The patient returns to clinic today for follow up. She continues to report bilateral knee pain. She continues to take care of her elderly ill father. She also reports that her brother has been ill and hospitalized. She continues to take Percocet as needed for pain with benefit. She denies any adverse effects. She continues to exercise and diet. Her pain today is 7/10.    Interval History 5/25/17:   The patient returns today for follow up. She is s/p left L2,3,4,5 cooled RFA on 4/26/17 and right L2,3,4,5 cooled RFA on 5/9/17. She reports 80% relief of her back pain. She continues to report bilateral knee pain that is worse with prolonged walking. She reports that she is exercising 5 days a week. She continues to take care of her elderly father. She continues to take Percocet as needed for pain with relief. She denies any other health changes. She denies any bowel or bladder incontinence. Her pain today is 4/10.     Interval History 4/11/2017:  The patient returns today for follow up and medication refill.  She has increased her dieting and exercise for weight loss.  She has lost 14 lbs since her last visit.  She is very excited about this.  She is walking 6  days per week.  She also stays active taking her of her elderly father.  She has given up meat for lent.  She continues to follow up with bariatrics.  Her biggest complaint today is lower back pain.  She previously had benefit with cooled lumbar RFAs and would like to schedule repeats.  Her pain is worse with prolonged standing and bending.  She is taking Percocet as needed for pain without adverse effects.  Her pain today is 6/10.  The patient denies any bowel or bladder incontinence or signs of saddle paresthesia.  The patient denies any major medical changes since last office visit.    Interval History 3/14/2017:  The patient returns today for follow up of back and knee pain.  She continues with measures for weight loss.  She is still planning on bariatric surgery but would like to lose weight on her own prior to this.  She has lost about 4 lbs since her visit with me last month.  She continues to take Percocet which helps her pain without adverse effects.  Her pain today is 8/10.  The patient denies any bowel or bladder incontinence or signs of saddle paresthesia.  The patient denies any major medical changes since last office visit.    Interval History 2/15/2017:  The patient returns today for follow up and medication refill.  She is still planning on having weight loss surgery in the future.  She admits that she has not been as active as previously.  She has a follow up with Dr. Swan scheduled next month.  She continues to take Percocet with benefit.  Her pain today is 8/10.      Interval History 1/18/2017:  The patient returns for follow up and medication refill.  She reports no major changes in her back and knee pain since her last visit.  She has had a lot going on with health issues of family members.  She takes care of her father and her brother, who were both recently hospitalized.  She still plans on having weight loss surgery in the future.  Her pain today is.  She is taking Percocet with benefit  and without side effects at this time.    Interval History 12/15/2016:  The patient returns today for follow up of lower back and bilateral knee pain.  She continues to report relief from cooled lumbar RFAs in October.  She feels as though the colder weather is causing increased knee pain.  Since her last visit, she has decided to have weight loss surgery.  She was evaluated by bariatrics and is undergoing pre-op workup at this time.  Her pain today is 8/10.  She continue to take Percocet with significant benefit.      Interval History 11/3/2016:  The patient returns today for follow up of back pain.  She is s/p left then right L2,3,4,5 cooled RFA completed on 10/19/16 with 80% pain relief.  She is very satisfied with these results.  She continues to take Percocet with relief.  She has started kick boxing classes and continues to lose weight.  She has noticeable weight loss since her last visit and is very happy about this.  Her pain today is 8/10.    Interval History 9/16/2016:  The patient returns today with complaints of lower back and knee pain.  Her worst pain is in her lower back without radiation.  She has lost weight since her last weight.  She has increased her exercise which is helping.  She continues with her diet plan.  She is having the pool at her home fixed and plans to use this for exercise, as she has benefited from frequent pool therapy at [facility].  She previously had significant relief with lumbar RFAs in April for about 4 months.  She would like to repeat the procedures.  She continues to take Percocet as needed which provides her relief.  Her pain today is 8/10.  The patient denies any bowel or bladder incontinence or signs of saddle paresthesia.      Interval History 8/19/2016:  The patient returns today for follow up and medication refill.  She complains of back and knee pain.  Her back pain does not radiate.  She did have relief with RFA in April but feels the pain is returning.  Her  previous UTI has resolved.  She has been unable to return to her aquatic therapy because she is caring for her father.  She plans to start walking in the morning before her father wakes up.  She has gained a few pounds since her last visit and is upset about this, as she was previously losing at each visit.  She is also trying to control her diet.  She continues to take Percocet with significant pain relief.  Her pain today is 8/10.  The patient denies any bowel or bladder incontinence or signs of saddle paresthesia.  The patient denies any major medical changes since last office visit.    Interval History 7/19/2016:  The patient returns today for follow up.  She has a history of lower back and bilateral knee pain.  Since her last visit, she reports being diagnosed with a UTI and is currently on antibiotics. She has still been unable to return to aquatherapy.  She has been performing a home exercise routine.  She has lost 6 lbs since her visit last month.  She is taking Percocet as needed for pain.  She reports efficacy without adverse effects.  Her pain today is 7/10.      Interval History 6/20/2016:  Patient returns for follow up and medication refill.  She has a history of lower back and bilateral knee pain.  Since her last encounter, she reports that she has been suffering with an upper respiratory infection.  She saw Dr. Richardson last week and was started on oral Augmentin and antibiotic eye drops.  She reports that whenever she is sick, she suffers with swelling and drainage to her left eye.  She states that her symptoms have improved since starting the eye drops.  She has been unable to participate in her daily pool therapy since she has been sick but is anxious to resume once she is feeling better.  She did have recent labwork which showed an improving A1C with cholesterol and triglycerides WNL.  She is proud of herself about this, as she reports be very good with her diet recently.  Her pain today is an  8/10.    Interval History 5/5/2016:  Patient returns today for procedure follow up.  She is s/p left then right L2,3,4,5 RFA completed on 4/20/16 with 70% pain relief so far.  She reports lower back and bilateral knee pain.  She has had genicular nerve blocks in the past which provided significant relief for her left knee pain and limited relief of her right knee pain.  She is currently doing physical therapy per self.  She is doing 45 minutes of pool therapy five days per week.  She thinks that this is helping with her pain and mobility.  She is trying to lose weight because she is aware that this will help with her pain.  She is taking percocet as needed which provided relief without side effects.  Her pain today is a 5/10.      Interval History: 3/28/2016:  Patient returns today for follow up of lower back and bilateral knee pain.  She is s/p Bilateral L2,3,4,5 MBB on 2/16/16 with 80% relief for 5 days and bilateral L2,3,4,5 MBB on 3/1/16 with 70% pain relief for 1 day.  She is requesting to schedule the RFAs.  The worst of her pain is located to her left lower back and does not radiate. She is still complaining of bilateral knee pain which is worse with walking and activity.  Her pain today is a 9/10.  The patient denies any bowel/bladder incontinence or symptoms of saddle paresthesia.  The patient denies any major medical changes since last OV. She is currently taking Percocet which helps her pain without any adverse effects.     Interval History: 12/17/2015:  Patient presents in clinic for follow up for lower back, bilateral knee, and right arm pain. Her pain is 9/10 today. She underwent carpal tunnel revision 12/14/15 in the right wrist. She is currently out of her Percocet 10-325mg prescription.   Cont to have significant low back pain, wh will also ich she reports is her worst current pain.  Based on previous imaging we know that the patient has significant facet arthropathy in the lumbar spine at the  levels of L3-4 and L4-5 L5-S1.  She describes dull achy with occasional sharp pain rates as 7/10.     Interval history 10/26/2015:  Patient returns to clinic for follow up previously seen for knee pain and low back pain. She reports pain is improved with Percocet 10/325 BID. She is no longer taking Lyrica and recently started Topamax. She continues to take Celebrex once per day and does help. She also continues to use a topical cream PRN and does help some. She has completed therapy since last visit but she is continuing to exercise regularly. Her pain in back and knees is unchanged in quality and distribution from previous visits. She otherwise denies any new issues at this time.     Interval history 9/21/2015:  Since previous encounter the patient comes in after having started using gabapentin and developing swelling in her legs.  She states that it did make a difference for her pain although she discontinued it secondary to swelling after a decrease in her dosing did not alleviate this.  She followed up with her orthopedist and did have x-rays performed which did not show any significant change compared to previous.  She is scheduled for a four-month follow-up.  She has not yet begun exercising but will begin soon she is scheduled for pool-based therapy.  The opioid medications have been helping her and her pain twice a day.  Further decrease to once a day prevented her from being able to function.  She does continue to take Celebrex without adverse reaction.  Additionally the patient stated that she has been having lower back pain which is new.    Interval History 08-: Since previous visit patient comes in today to discuss her medications.  Patient states she is having pain in the lower back and both knee, sharp , throbbing , burning, and stabbing pain, she rates it 8/10.  Patient is taking percocet 10/325mg, we have been weaning her from this medication last prescription was provided for 30 tablets.   Additionally the patient is taking Celebrex with regularity with some improvement in her pain.  She has completed physical therapy status post knee replacement her knee hardware appears appropriate she has a scheduled appointment to follow-up with her orthopedic surgeon in one week to discuss using a extension brace while she is at home laying in bed.  She continues to swim twice a week and is actively trying to lose weight and has been dieting.    Interval History 06/19/2015:  Patient presents in clinic for two month follow up. She reports her bilateral knee pain and low back pain is an 8/10. She currently takes celebrex and Percocet for pain and uses a topical cream.  She was recently evaluated by her orthopedist and the hardware all appears to be appropriately placed and the next follow-up is in 6 weeks.  The patient continues to work on weight loss and exercise and states that she has been doing more than in the past.   Patient reports no other health changes since previous encounter.    Interval History 04/09/2015:  Patient presents in clinic for one month follow up. She reports bilateral knee pain and low back pain is a 9/10 today. She currently takes percocet for pain as needed and uses a cane for ambulation. She states that the low back pain is new.  She continues to have bilateral knee pain and is in physical therapy.  She continues to take Celebrex daily and uses a topical pain cream regularly.    Patient reports no other health changes since previous encounter.    Interval history 3/5/2015:  Since previous encounter patient is status post right total knee replacement on 11/4/2014 and has been healed with postoperative visits showing good progress.  The patient does have continued physical therapy sessions and has been attempting to lose weight and has lost approximately 25 pounds although her BMI continues to be 54.  She has been making good efforts to try and continue to increase her range of motion and  lose weight.  She continues to have significant pain in bilateral knees and continues to use a cane for ambulation.  She was receiving oxycodone/acetaminophen 10/325 every 8 hours by mouth when necessary and requiring in order to persist in her physical therapy although the topical pain cream that she has been applying has been helping her to a limited degree she still requires the medication regularly.  Her recent x-ray imaging shows good positioning of the prosthesis.  No other health changes since previous encounter.     Interval history 2/17/2014:  Patient reports that she has been using the topical compounded cream on the knee approximately 4 times per day and she states that it does help her with her pain that she is experiencing.  She states that she has not gone to formal physical therapy but that she has been going to a pool that has exercise classes which is free for her and that she feels like it is helping her continue to lose weight.  Additionally she continues using hydrocodone/acetaminophen 7.5/750 approximately 3 times per day when necessary and states that also helps with her pain symptoms.  He she's still talks to have replacement for the left knee, but would like to lose weight further before going to that.  She has also had previous injections into her knees which have offered little to no relief in her pain symptoms.  She has had no other health changes since previous encounter.    Previous encounter 1/21/2014:  HPI Comments: 47 yo female presents for initial evaluation of bilateral knee pain, L>R. She is s/p arthroscopic right knee surgery and eventual replacement and then revision surgeries (Dr. Swan, Orthopedics). The pain is present in both knees and is described as a terrible ache. She hears occasional popping in both knees with movement. The pain is worse with being on her feet and getting up from a sitting to standing position. Denies lower ext weakness or paresthesias. She does not  have back pain or pain radiating down her legs. The pain is better with Celebrex and rest. She also takes Vicodin ES TID but this makes her very sleepy. She is not sure it helps with the pain because she usually falls asleep.     Physical Therapy: not since 2011 just prior to her 3rd right knee surgery (revision after replacement); has tried swimming which helps with weight loss    Non-pharmacologic Treatment: none    Pain Medications: Percocet and celebrex    Blood thinners: ASA 81 mg daily     Interventional Therapies:   steroid inj and visco-supplementation (series of 3) in left knee- not helpful  3/31/14 Bilateral genicular nerve block- significant relief of left knee, limited relief of right knee pain  2/16/16 Bilateral L2,3,4,5 MBB  3/1/16 Bilateral L2,3,4,5 MBB   4/6/16 Left L2,3,4,5 RFA- significant relief  4/20/16 Right L2,3,4,5 RFA- significant relief  10/5/16 Left L2,3,4,5 cooled RFA  10/19/16 Right L2,3,4,5 cooled RFA  4/26/17 Left L2,3,4,5 cooled RFA  5/9/17 Right L2,3,4,5 cooled RFA      Relevant Surgeries: right knee surgery x3 (arthroscopic, then replacement and subsequent revision surgery), s/p left total knee arthroplasty    Relevant Imaging:  Xray Lumbar spine 09/21/2015  Lumbar spine radiograph    Comparison: None    Results: AP, lateral neutral, lateral flexion , lateral extension, bilateral oblique and spot views. The alignment of the lumbar spine demonstrates a mild levoscoliosis . 11-mm anterior listhesis of L4 relative to L5 with no translational abnormalities  seen on flexion and extension views. The vertebral body heights are well-maintained , mild disk space narrowing L4-L5 and L5-S1. Mild anterior and marginal osteophyte formation seen throughout the lumbar spine . The oblique views demonstrate no   definite spondylolysis. There is facet joint osseous hypertrophy noted at L3-L4 and L4-L5.      Impression         The Significant spondylosis of the lumbar spine with grade 1 anterior  listhesis L4 relative to L5.  Facet joint osseous hypertrophy L3-L4 and L4-5.      Electronically signed by: BLESSING ROE MD  Date: 09/21/15  Time: 09:56          X-ray knee bilateral 8/26/2015:  Standing AP knees, lateral view of both knees and sunrise view of both patella. Study compared to May 2015. Postop change of bilateral knee replacement. The prosthetic components are in satisfactory position. Erosive changes involving the patella   again evident bilaterally.    Impression no significant change.      Xray Bilateral Knee 05/25/2015:  There are bilateral total knee arthroplasties with posterior resurfacing of the patella. As observed on 12/17/2014 there is a fracture of the left patella in the parasagittal plane.    Heterotopic bone is evident about each knee.    I detect no dislocation, unusual radiopaque retained foreign body, lytic or blastic lesion, or chondrocalcinosis.    Lab Results   Component Value Date    HGBA1C 9.3 (H) 08/07/2017     Lab Results   Component Value Date    CHOL 191 05/09/2017    CHOL 200 (H) 12/15/2016    CHOL 153 06/17/2016     Lab Results   Component Value Date    HDL 48 05/09/2017    HDL 40 12/15/2016    HDL 44 06/17/2016     Lab Results   Component Value Date    LDLCALC 129.0 05/09/2017    LDLCALC 141.0 12/15/2016    LDLCALC 92.6 06/17/2016     Lab Results   Component Value Date    TRIG 70 05/09/2017    TRIG 95 12/15/2016    TRIG 82 06/17/2016     Lab Results   Component Value Date    CHOLHDL 25.1 05/09/2017    CHOLHDL 20.0 12/15/2016    CHOLHDL 28.8 06/17/2016         Past Medical History:   Diagnosis Date    Asthma     Diabetes mellitus type II     DJD (degenerative joint disease) of knee 6/19/2014    Hyperlipidemia     Morbid obesity     Sleep apnea      Past Surgical History:   Procedure Laterality Date    CARPAL TUNNEL RELEASE      CARPAL TUNNEL RELEASE  1980s    left    CARPAL TUNNEL RELEASE  2012    right    JOINT REPLACEMENT Bilateral     with 2 revisions  on rt    KNEE SURGERY  3/2010    orthroscope    KNEE SURGERY  6-19-14    left TKR     Family History   Problem Relation Age of Onset    Diabetes Mother     Hypertension Mother     Cataracts Mother     Diabetes Father     Cataracts Father     Coronary artery disease Brother     Amblyopia Neg Hx     Blindness Neg Hx     Cancer Neg Hx     Glaucoma Neg Hx     Macular degeneration Neg Hx     Retinal detachment Neg Hx     Strabismus Neg Hx     Stroke Neg Hx     Thyroid disease Neg Hx      Social History     Social History    Marital status: Single     Spouse name: N/A    Number of children: N/A    Years of education: N/A     Occupational History    Not on file.     Social History Main Topics    Smoking status: Never Smoker    Smokeless tobacco: Never Used    Alcohol use Yes      Comment: occasionally     Drug use: No    Sexual activity: Yes     Partners: Female     Birth control/ protection: None     Other Topics Concern    Not on file     Social History Narrative    Disabled. The patient is the youngest of 6 siblings. Single. Lives with single-sex partner.                      Review of patient's allergies indicates:  No Known Allergies    Medication List with Changes/Refills   Current Medications    ALBUTEROL 90 MCG/ACTUATION INHALER    Take 2 puffs every 4-6 hours  as needed for shortness of breath/wheezing    AMOXICILLIN-CLAVULANATE 875-125MG (AUGMENTIN) 875-125 MG PER TABLET    Take 1 tablet by mouth 2 (two) times daily.    ASPIRIN (ECOTRIN) 81 MG EC TABLET    Take 1 tablet by mouth every morning. prevents heart attacks and strokes    ATORVASTATIN (LIPITOR) 20 MG TABLET    Take 1 tablet (20 mg total) by mouth once daily.    BLOOD SUGAR DIAGNOSTIC STRP    Freestyle light strips and lancets    CELECOXIB (CELEBREX) 200 MG CAPSULE    TAKE ONE CAPSULE BY MOUTH DAILY    DIETHYLPROPION 75 MG TBSR    Take 75 mg by mouth once daily.    ECONAZOLE NITRATE 1 % CREAM    Apply topically 2 (two) times  "daily.    FLUCONAZOLE (DIFLUCAN) 150 MG TAB        FLUOXETINE (PROZAC) 20 MG CAPSULE    Take 1 capsule (20 mg total) by mouth once daily.    FLUTICASONE (FLONASE) 50 MCG/ACTUATION NASAL SPRAY    1 spray by Each Nare route 2 (two) times daily as needed for Rhinitis.    HUMALOG 100 UNIT/ML INJECTION    INJECT 11 UNITS SUBCUTANEOUSLY THREE TIMES DAILY BEFORE MEAL(S) PLUS  SLIDING  SCALE    INSULIN DETEMIR (LEVEMIR FLEXPEN) 100 UNIT/ML (3 ML) SUBQ INPN PEN    Inject 20 Units into the skin every evening.    INSULIN LISPRO (HUMALOG KWIKPEN) 100 UNIT/ML INPN PEN    11 Units before meals plus sliding scale    INSULIN LISPRO (HUMALOG) 100 UNIT/ML INJECTION    Inject 11 Units into the skin 3 (three) times daily before meals. Glucose        Intervention  (units to add in addition to meal time Humalog base dose of 11 Units)                        0-80    orange juice             0 units     151 - 200       +2 Units     201 - 250       +4 Units     251 - 300       +6 Units     301 - 350        +8 Units     351 - 400       +10 Units     > 400           Call MD    INSULIN LISPRO (HUMALOG) 100 UNIT/ML INJECTION    11 Units before meals plus sliding scale    LISINOPRIL (PRINIVIL,ZESTRIL) 2.5 MG TABLET    One daily for proteinuria.    METFORMIN (GLUCOPHAGE-XR) 500 MG 24 HR TABLET    Take 2 tablets (1,000 mg total) by mouth 2 (two) times daily.    OMEPRAZOLE (PRILOSEC) 20 MG CAPSULE    Take 1 capsule (20 mg total) by mouth every morning.    OXYCODONE-ACETAMINOPHEN (PERCOCET)  MG PER TABLET    Take 1 tablet by mouth every 6 (six) hours as needed for Pain.    PEN NEEDLE, DIABETIC 33 GAUGE X 5/32" NDLE    1 application by Misc.(Non-Drug; Combo Route) route 4 (four) times daily with meals and nightly.    VITAMIN D 185 MG TAB    Take 5,000 mg by mouth once daily.         REVIEW OF SYSTEMS:    GENERAL:  Patient is actively losing weight.  RESPIRATORY:  Negative for cough, wheezing or shortness of breath, patient denies any " recent URI.  CARDIOVASCULAR:  Negative for chest pain, leg swelling or palpitations.  GI:  Negative for abdominal discomfort, blood in stools or black stools or change in bowel habits, occasional constipation.  MUSCULOSKELETAL:  See HPI.  SKIN:  Negative for lesions, rash, and itching.  PSYCH:  No mood disorder or recent psychosocial stressors.  Patient's sleep is disturbed secondary to pain (patient reports also that she has insomnia).  HEMATOLOGY/LYMPHOLOGY:  Negative for prolonged bleeding, bruising easily or swollen nodes.  81 mg aspirin  ENDO: Patient has a history of diabetes  NEURO:   No history of headaches, syncope, paralysis, seizures or tremors.  All other reviewed and negative other than HPI.    OBJECTIVE:    /83   Pulse 64   Temp 98 °F (36.7 °C)   Wt (!) 158 kg (348 lb 5.2 oz)   LMP 11/03/2017   BMI 57.96 kg/m²     PHYSICAL EXAMINATION:    GENERAL: Well appearing, in no acute distress, alert and oriented x3.   PSYCH:  Mood and affect appropriate.  SKIN: Skin color, texture, turgor normal, no rashes or lesions.  HEAD/FACE:  Normocephalic, atraumatic.   CV: RRR with palpation of the radial artery.  PULM: No evidence of respiratory difficulty, symmetric chest rise.  BACK: No pain to palpation over the lumbar facet joints.  Limited ROM with pain on flexion and extension.  Positive bilateral facet loading. Negative SLR bilaterally.  Pain with palpation to bilateral SI joints.  JENNA is negative bilaterally.  EXTREMITIES:  Well-healed midline scars to bilateral knees.  Painful extension and flexion of bilateral knees. Medial joint line tenderness to bilateral knees, left greater than right.     MUSCULOSKELETAL: Bilateral upper and lower extremity strength is normal and symmetric.  No atrophy or tone abnormalities are noted.  NEURO: Bilateral lower extremity coordination and muscle stretch reflexes are physiologic and symmetric.  Plantar response are downgoing. No clonus.  No loss of sensation is  noted.  GAIT: Antalgic- ambulating without assistance.       Assessment:       Encounter Diagnoses   Name Primary?    Facet arthritis of lumbar region Yes    Lumbar spondylosis     DDD (degenerative disc disease), lumbar     Bilateral chronic knee pain     Status post total right knee replacement     Chronic pain disorder     Encounter for long-term opiate analgesic use          Plan:       - Previous imaging was reviewed and discussed with the patient today.     - Continue to f/u with bariatrics for possible gastric sleeve surgery.      - The patient will continue a home exercise routine to help with pain and strengthening.  She is walking and/or riding her bike daily.     - Schedule for left then right L2,3,4,5 RFA one side at a time 2 weeks apart. The procedure, risks, benefits and options were discussed with patient. There are no contraindications to the procedure. The patient expressed understanding and agreed to proceed.  Consent obtained today.    - Continue oxycodone-acetaminophen 10/325 one tablet every 6 hours PRN pain, #120, 0 refills. She does not need a refill today. She may call for a refill.     - The patient is here today for a refill of current pain medications and they believe these provide effective pain control and improvements in quality of life.  The patient notes no serious side effects, and feels the benefits outweigh the risks.  The patient was reminded of the pain contract that they signed previously as well as the risks and benefits of the medication including possible death.  The updated [place] Board of Pharmacy prescription monitoring program was reviewed, and the patient has been found to be compliant with current treatment plan.    - UDS from 8/25/2017 reviewed and consistent.      - Continue topical pain cream PRN.    - RTC 3 weeks after above procedure.     - Dr. Wagner was consulted on the patient and agrees with this plan.    The above plan and management options were  discussed at length with patient. Patient is in agreement with the above and verbalized understanding.     Adeola Robledo NP  11/20/2017

## 2017-11-22 ENCOUNTER — OUTPATIENT CASE MANAGEMENT (OUTPATIENT)
Dept: ADMINISTRATIVE | Facility: OTHER | Age: 52
End: 2017-11-22

## 2017-11-22 NOTE — PROGRESS NOTES
The following patient has been assigned to Shelia Welch RN with Outpatient Complex Care Management for high risk screening.    Reason: High Risk    Please contact OPCM at [phone] with any questions.    Thank you,  Claudia Luna

## 2017-11-28 ENCOUNTER — OUTPATIENT CASE MANAGEMENT (OUTPATIENT)
Dept: ADMINISTRATIVE | Facility: OTHER | Age: 52
End: 2017-11-28

## 2017-11-28 NOTE — PROGRESS NOTES
"Patient referred to OPCM for high risk. Spoke with patient, explained OPCM program, patient states "it's not a good time to talk" and requests I call back to leave my number on her voice message and she will return call when it is convenient.  "

## 2017-12-02 ENCOUNTER — HOSPITAL ENCOUNTER (EMERGENCY)
Facility: HOSPITAL | Age: 52
Discharge: HOME | End: 2017-12-02
Attending: EMERGENCY MEDICINE
Payer: COMMERCIAL

## 2017-12-02 VITALS
SYSTOLIC BLOOD PRESSURE: 145 MMHG | BODY MASS INDEX: 48.82 KG/M2 | TEMPERATURE: 99 F | RESPIRATION RATE: 17 BRPM | HEART RATE: 78 BPM | OXYGEN SATURATION: 98 % | HEIGHT: 65 IN | DIASTOLIC BLOOD PRESSURE: 79 MMHG | WEIGHT: 293 LBS

## 2017-12-02 DIAGNOSIS — T14.8XXA MUSCLE STRAIN: Primary | ICD-10-CM

## 2017-12-02 PROCEDURE — 99283 EMERGENCY DEPT VISIT LOW MDM: CPT

## 2017-12-02 PROCEDURE — 99282 EMERGENCY DEPT VISIT SF MDM: CPT | Mod: ,,, | Performed by: EMERGENCY MEDICINE
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
## 2017-12-04 ENCOUNTER — TELEPHONE (OUTPATIENT)
Dept: OBSTETRICS AND GYNECOLOGY | Facility: CLINIC | Age: 52
End: 2017-12-04

## 2017-12-04 ENCOUNTER — TELEPHONE (OUTPATIENT)
Dept: ORTHOPEDICS | Facility: CLINIC | Age: 52
End: 2017-12-04

## 2017-12-04 ENCOUNTER — OUTPATIENT CASE MANAGEMENT (OUTPATIENT)
Dept: ADMINISTRATIVE | Facility: OTHER | Age: 52
End: 2017-12-04

## 2017-12-04 NOTE — TELEPHONE ENCOUNTER
----- Message from Radha Cruz sent at 12/4/2017  4:46 PM CST -----  Contact: Patient   _1st Request  _  2nd Request  _  3rd Request    Who:DONTAE MOON [[id number]]    Why:Patient was returning a missed call back from the staff    What Number to Call Back:[phone]    When to Expect a call back: (Before the end of the day)   -- if call after 3:00 call back will be tomorrow.     all

## 2017-12-04 NOTE — TELEPHONE ENCOUNTER
----- Message from Denise Downing sent at 12/4/2017  8:24 AM CST -----  Contact: Pt can be reached at [phone]  Pt is calling to send message to apologize for canceling due to family emergency and work conflict in schedule.      Pt is requesting to a call back to discuss appt from the nurse.    Thank you!

## 2017-12-04 NOTE — TELEPHONE ENCOUNTER
----- Message from Dana Whitmore sent at 12/4/2017  4:24 PM CST -----  Contact: DONTAE MOON [[id number]]  x_  1st Request  _  2nd Request  _  3rd Request        Who:   DONTAE MOON [[id number]]  Why: Requesting a call back in regards to a call she was returning, please call her back.     What Number to Call Back: [phone]    When to Expect a call back: (Within 24 hours)    Please return the call at earliest convenience. Thanks!

## 2017-12-05 ENCOUNTER — OUTPATIENT CASE MANAGEMENT (OUTPATIENT)
Dept: ADMINISTRATIVE | Facility: OTHER | Age: 52
End: 2017-12-05

## 2017-12-05 NOTE — PROGRESS NOTES
#3 Attempt to complete OPCM screen unsuccessfully. Patient reports this is not a good time to talk. States it would have to be closer to 4 pm. Patient requests letter with OPCM information be mailed.

## 2017-12-05 NOTE — LETTER
December 5, 2017    Alivia [place]  [address] [facility] [id number]             [facility] [address] [place] [address] Dear Ms. Marie Cyr:    I work with [facility]'s Outpatient Case Management Department. We received a referral to call you to discuss your medical history. I attempted to reach you by telephone, but I was unsuccessful. Please call our department so that we can go over some questions with you regarding your health.    The Outpatient Case Management Department can be reached at [phone] from 8:00AM to 4:30 PM on Monday thru Friday. [facility] also has a program where a nurse is available 24/7 to answer questions or provide medical advice, their number is [phone].    Thanks,        Shelia Welch RN [facility]  Outpatient Complex Care Management

## 2017-12-06 ENCOUNTER — TELEPHONE (OUTPATIENT)
Dept: PAIN MEDICINE | Facility: CLINIC | Age: 52
End: 2017-12-06

## 2017-12-06 DIAGNOSIS — M47.899 FACET SYNDROME: ICD-10-CM

## 2017-12-06 DIAGNOSIS — M19.90 OSTEOARTHRITIS, UNSPECIFIED OSTEOARTHRITIS TYPE, UNSPECIFIED SITE: ICD-10-CM

## 2017-12-06 RX ORDER — OXYCODONE AND ACETAMINOPHEN 10; 325 MG/1; MG/1
1 TABLET ORAL EVERY 6 HOURS PRN
Qty: 120 TABLET | Refills: 0 | Status: SHIPPED | OUTPATIENT
Start: 2017-12-07 | End: 2018-01-03 | Stop reason: SDUPTHER

## 2017-12-06 RX ORDER — OXYCODONE AND ACETAMINOPHEN 10; 325 MG/1; MG/1
1 TABLET ORAL EVERY 6 HOURS PRN
Qty: 120 TABLET | Refills: 0 | Status: CANCELLED | OUTPATIENT
Start: 2017-12-06

## 2017-12-06 NOTE — TELEPHONE ENCOUNTER
----- Message from Jane Clements sent at 12/6/2017  1:21 PM CST -----  Contact: pt  _ 1st Request  x_ 2nd Request  _ 3rd Request    Who: pt    Why: in regards to a refill. Pt states she has left a previous message with no return call. Pt states she is totally out of her RX    What Number to Call Back: [phone]    When to Expect a call back: (Before the end of the day)  -- if call after 3:00 call back will be tomorrow.

## 2017-12-06 NOTE — TELEPHONE ENCOUNTER
Spoke with patient, advised of refill request already sent to Dr Wagner and waiting for his approval to be released

## 2017-12-06 NOTE — TELEPHONE ENCOUNTER
----- Message from Antonella Mueller sent at 12/6/2017 10:01 AM CST -----      _x  1st Request  _  2nd Request  _  3rd Request    Please refill the medication(s) listed below. Please call the patient when the prescription(s) is ready for  at this phone number [phone]           Medication #1    oxyCODONE-acetaminophen (PERCOCET)  mg per tablet 120 tablet 0 11/6/2017  No  Sig - Route: Take 1 tablet by mouth every 6 (six) hours as needed for Pain. - Oral  Class: Normal  Order: [id number]

## 2017-12-08 ENCOUNTER — OUTPATIENT CASE MANAGEMENT (OUTPATIENT)
Dept: ADMINISTRATIVE | Facility: OTHER | Age: 52
End: 2017-12-08

## 2017-12-08 NOTE — PROGRESS NOTES
The following patient has been assigned to Shelia Welch RN with Outpatient Complex Care Management for high risk screening.    Reason: High Risk    Please contact OPCM at [phone] with any questions.    Thank you,    Neena Davis, SSC

## 2017-12-12 ENCOUNTER — OUTPATIENT CASE MANAGEMENT (OUTPATIENT)
Dept: ADMINISTRATIVE | Facility: OTHER | Age: 52
End: 2017-12-12

## 2017-12-12 NOTE — PROGRESS NOTES
Case closed as discussed with OPCM Manager Mera Flores RN that patient on 12/5/17 was reached who requests letter with OPCM information by mailed to her, for her to call when she has the time and needs. This referral was placed 3 days after mailing letter.

## 2017-12-18 ENCOUNTER — TELEPHONE (OUTPATIENT)
Dept: INTERNAL MEDICINE | Facility: CLINIC | Age: 52
End: 2017-12-18

## 2017-12-18 ENCOUNTER — IMMUNIZATION (OUTPATIENT)
Dept: INTERNAL MEDICINE | Facility: CLINIC | Age: 52
End: 2017-12-18
Payer: MEDICARE

## 2017-12-18 ENCOUNTER — OFFICE VISIT (OUTPATIENT)
Dept: INTERNAL MEDICINE | Facility: CLINIC | Age: 52
End: 2017-12-18
Payer: MEDICARE

## 2017-12-18 ENCOUNTER — LAB VISIT (OUTPATIENT)
Dept: LAB | Facility: HOSPITAL | Age: 52
End: 2017-12-18
Attending: INTERNAL MEDICINE
Payer: MEDICARE

## 2017-12-18 VITALS
HEIGHT: 65 IN | DIASTOLIC BLOOD PRESSURE: 81 MMHG | SYSTOLIC BLOOD PRESSURE: 130 MMHG | HEART RATE: 92 BPM | WEIGHT: 293 LBS | BODY MASS INDEX: 48.82 KG/M2

## 2017-12-18 DIAGNOSIS — J45.20 ASTHMA, WELL CONTROLLED, MILD INTERMITTENT: ICD-10-CM

## 2017-12-18 DIAGNOSIS — G89.29 CHRONIC PAIN OF BOTH KNEES: ICD-10-CM

## 2017-12-18 DIAGNOSIS — E03.9 HYPOTHYROIDISM, UNSPECIFIED TYPE: ICD-10-CM

## 2017-12-18 DIAGNOSIS — N95.1 VASOMOTOR SYMPTOMS DUE TO MENOPAUSE: ICD-10-CM

## 2017-12-18 DIAGNOSIS — M25.561 CHRONIC PAIN OF BOTH KNEES: ICD-10-CM

## 2017-12-18 DIAGNOSIS — M25.562 CHRONIC PAIN OF BOTH KNEES: ICD-10-CM

## 2017-12-18 DIAGNOSIS — E78.5 HYPERLIPIDEMIA, UNSPECIFIED HYPERLIPIDEMIA TYPE: ICD-10-CM

## 2017-12-18 DIAGNOSIS — D64.9 ANEMIA, UNSPECIFIED TYPE: ICD-10-CM

## 2017-12-18 DIAGNOSIS — G47.30 SLEEP APNEA, UNSPECIFIED TYPE: ICD-10-CM

## 2017-12-18 DIAGNOSIS — J45.20 MILD INTERMITTENT ASTHMA WITHOUT COMPLICATION: Primary | ICD-10-CM

## 2017-12-18 DIAGNOSIS — E55.9 MILD VITAMIN D DEFICIENCY: ICD-10-CM

## 2017-12-18 DIAGNOSIS — K21.9 GASTROESOPHAGEAL REFLUX DISEASE WITHOUT ESOPHAGITIS: ICD-10-CM

## 2017-12-18 DIAGNOSIS — R80.9 PROTEINURIA, UNSPECIFIED TYPE: ICD-10-CM

## 2017-12-18 LAB
25(OH)D3+25(OH)D2 SERPL-MCNC: 12 NG/ML
ALBUMIN SERPL BCP-MCNC: 3.3 G/DL
ALP SERPL-CCNC: 57 U/L
ALT SERPL W/O P-5'-P-CCNC: 12 U/L
ANION GAP SERPL CALC-SCNC: 7 MMOL/L
AST SERPL-CCNC: 12 U/L
BASOPHILS # BLD AUTO: 0.02 K/UL
BASOPHILS NFR BLD: 0.2 %
BILIRUB SERPL-MCNC: 0.3 MG/DL
BUN SERPL-MCNC: 16 MG/DL
CALCIUM SERPL-MCNC: 9.5 MG/DL
CHLORIDE SERPL-SCNC: 108 MMOL/L
CO2 SERPL-SCNC: 27 MMOL/L
CREAT SERPL-MCNC: 0.8 MG/DL
DIFFERENTIAL METHOD: ABNORMAL
EOSINOPHIL # BLD AUTO: 0.2 K/UL
EOSINOPHIL NFR BLD: 1.8 %
ERYTHROCYTE [DISTWIDTH] IN BLOOD BY AUTOMATED COUNT: 17.2 %
EST. GFR  (AFRICAN AMERICAN): >60 ML/MIN/1.73 M^2
EST. GFR  (NON AFRICAN AMERICAN): >60 ML/MIN/1.73 M^2
ESTIMATED AVG GLUCOSE: 166 MG/DL
FERRITIN SERPL-MCNC: 7 NG/ML
FSH SERPL-ACNC: 27.9 MIU/ML
GLUCOSE SERPL-MCNC: 144 MG/DL
HBA1C MFR BLD HPLC: 7.4 %
HCT VFR BLD AUTO: 35.9 %
HGB BLD-MCNC: 10.8 G/DL
IRON SERPL-MCNC: 25 UG/DL
LYMPHOCYTES # BLD AUTO: 2.1 K/UL
LYMPHOCYTES NFR BLD: 22.8 %
MCH RBC QN AUTO: 25.8 PG
MCHC RBC AUTO-ENTMCNC: 30.1 G/DL
MCV RBC AUTO: 86 FL
MONOCYTES # BLD AUTO: 0.7 K/UL
MONOCYTES NFR BLD: 7.7 %
NEUTROPHILS # BLD AUTO: 6.1 K/UL
NEUTROPHILS NFR BLD: 67.5 %
PLATELET # BLD AUTO: 338 K/UL
PMV BLD AUTO: 12.1 FL
POTASSIUM SERPL-SCNC: 5 MMOL/L
PROT SERPL-MCNC: 7.8 G/DL
RBC # BLD AUTO: 4.18 M/UL
SATURATED IRON: 5 %
SODIUM SERPL-SCNC: 142 MMOL/L
TOTAL IRON BINDING CAPACITY: 485 UG/DL
TRANSFERRIN SERPL-MCNC: 328 MG/DL
TSH SERPL DL<=0.005 MIU/L-ACNC: 2.58 UIU/ML
WBC # BLD AUTO: 9.04 K/UL

## 2017-12-18 PROCEDURE — 83540 ASSAY OF IRON: CPT

## 2017-12-18 PROCEDURE — 80053 COMPREHEN METABOLIC PANEL: CPT

## 2017-12-18 PROCEDURE — G0008 ADMIN INFLUENZA VIRUS VAC: HCPCS | Mod: PBBFAC,PO

## 2017-12-18 PROCEDURE — 84443 ASSAY THYROID STIM HORMONE: CPT

## 2017-12-18 PROCEDURE — 83036 HEMOGLOBIN GLYCOSYLATED A1C: CPT

## 2017-12-18 PROCEDURE — 83001 ASSAY OF GONADOTROPIN (FSH): CPT

## 2017-12-18 PROCEDURE — 99999 PR PBB SHADOW E&M-EST. PATIENT-LVL IV: CPT | Mod: PBBFAC,,, | Performed by: INTERNAL MEDICINE

## 2017-12-18 PROCEDURE — 99214 OFFICE O/P EST MOD 30 MIN: CPT | Mod: PBBFAC,PO,25 | Performed by: INTERNAL MEDICINE

## 2017-12-18 PROCEDURE — 36415 COLL VENOUS BLD VENIPUNCTURE: CPT | Mod: PO

## 2017-12-18 PROCEDURE — 99214 OFFICE O/P EST MOD 30 MIN: CPT | Mod: S$PBB,,, | Performed by: INTERNAL MEDICINE

## 2017-12-18 PROCEDURE — 82728 ASSAY OF FERRITIN: CPT

## 2017-12-18 PROCEDURE — 82306 VITAMIN D 25 HYDROXY: CPT

## 2017-12-18 PROCEDURE — 85025 COMPLETE CBC W/AUTO DIFF WBC: CPT | Mod: PO

## 2017-12-18 RX ORDER — OMEPRAZOLE 20 MG/1
20 CAPSULE, DELAYED RELEASE ORAL EVERY MORNING
Qty: 30 CAPSULE | Refills: 6 | Status: SHIPPED | OUTPATIENT
Start: 2017-12-18 | End: 2018-12-24 | Stop reason: SDUPTHER

## 2017-12-18 RX ORDER — INSULIN LISPRO 100 [IU]/ML
INJECTION, SOLUTION INTRAVENOUS; SUBCUTANEOUS
Qty: 10 ML | Refills: 6 | Status: SHIPPED | OUTPATIENT
Start: 2017-12-18 | End: 2018-03-19

## 2017-12-18 RX ORDER — METFORMIN HYDROCHLORIDE 500 MG/1
1000 TABLET, EXTENDED RELEASE ORAL 2 TIMES DAILY
Qty: 360 TABLET | Refills: 6 | Status: SHIPPED | OUTPATIENT
Start: 2017-12-18 | End: 2018-12-24 | Stop reason: SDUPTHER

## 2017-12-18 RX ORDER — ALBUTEROL SULFATE 90 UG/1
AEROSOL, METERED RESPIRATORY (INHALATION)
Qty: 1 INHALER | Refills: 6 | Status: SHIPPED | OUTPATIENT
Start: 2017-12-18 | End: 2018-05-04 | Stop reason: SDUPTHER

## 2017-12-18 RX ORDER — LISINOPRIL 2.5 MG/1
TABLET ORAL
Qty: 30 TABLET | Refills: 6 | Status: SHIPPED | OUTPATIENT
Start: 2017-12-18 | End: 2018-12-24 | Stop reason: SDUPTHER

## 2017-12-18 RX ORDER — PEN NEEDLE, DIABETIC 33 GX5/32"
1 NEEDLE, DISPOSABLE MISCELLANEOUS
Qty: 100 EACH | Refills: 6 | Status: SHIPPED | OUTPATIENT
Start: 2017-12-18 | End: 2019-09-20

## 2017-12-18 RX ORDER — INSULIN PUMP SYRINGE, 3 ML
EACH MISCELLANEOUS
Qty: 1 EACH | Refills: 0 | Status: SHIPPED | OUTPATIENT
Start: 2017-12-18 | End: 2018-12-18

## 2017-12-18 RX ORDER — ATORVASTATIN CALCIUM 20 MG/1
20 TABLET, FILM COATED ORAL DAILY
Qty: 30 TABLET | Refills: 6 | Status: SHIPPED | OUTPATIENT
Start: 2017-12-18 | End: 2018-12-24 | Stop reason: SDUPTHER

## 2017-12-18 RX ORDER — CELECOXIB 200 MG/1
200 CAPSULE ORAL DAILY
Qty: 30 CAPSULE | Refills: 2 | Status: SHIPPED | OUTPATIENT
Start: 2017-12-18 | End: 2018-04-06 | Stop reason: SDUPTHER

## 2017-12-18 RX ORDER — FLUOXETINE HYDROCHLORIDE 20 MG/1
20 CAPSULE ORAL DAILY
Qty: 30 CAPSULE | Refills: 6 | Status: SHIPPED | OUTPATIENT
Start: 2017-12-18 | End: 2018-03-19

## 2017-12-18 NOTE — TELEPHONE ENCOUNTER
[facility] pharmacy stated insurance company will not cover medication insulin detemir (LEVEMIR FLEXPEN) 100 unit/mL (3 mL) SubQ InPn pen  , but they will cover Basaglar and she will need pen needles to go with pen
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
## 2017-12-18 NOTE — TELEPHONE ENCOUNTER
----- Message from Anitha Foote sent at 12/18/2017 12:09 PM CST -----  Contact: Sandra [phone]  [facility] pharmacy stated insurance company will not cover medication insulin detemir (LEVEMIR FLEXPEN) 100 unit/mL (3 mL) SubQ InPn pen  , but they will cover Basaglar and she will need pen needles to go with pen  Please advise , Thanks

## 2017-12-18 NOTE — TELEPHONE ENCOUNTER
----- Message from Anitha Foote sent at 12/18/2017 12:09 PM CST -----  Contact: Sandra [phone]  [facility] pharmacy stated insurance company will not cover medication insulin detemir (LEVEMIR FLEXPEN) 100 unit/mL (3 mL) SubQ InPn pen  , but they will cover Basaglar and she will need pen needles to go with pen  Please advise , Thanks

## 2017-12-19 NOTE — TELEPHONE ENCOUNTER
----- Message from Dana Saldana sent at 12/19/2017  9:21 AM CST -----  Contact: self/[phone]  Patient called in regards needing to talk with Dr Richardson about changing meter, lancet, and test strip, to one touch kit system. Please call to [facility] - [place], [place] - [address] [phone] (Phone) [phone] (Fax). Patient stated that she need that ASAP.   Please call and advise.       Thank you!!!

## 2017-12-19 NOTE — TELEPHONE ENCOUNTER
----- Message from Michael Hernandez sent at 12/18/2017  2:09 PM CST -----  Contact: Lamar/Sandra [phone]  Verbal Auth to change the needles and lancets to what the insurance will cover and Directions on the test strips and lancets. Also, and new prescription for needles to go with the insulin pen for Levemir.    Thank you

## 2017-12-19 NOTE — TELEPHONE ENCOUNTER
Verbal Auth to change the needles and lancets to what the insurance will cover and Directions on the test strips and lancets. Also, and new prescription for needles to go with the insulin pen for Levemir

## 2017-12-19 NOTE — TELEPHONE ENCOUNTER
Patient called in regards needing to talk with Dr Richardson about changing meter, lancet, and test strip, to one touch kit system. Please call to [facility] [address] [place] [address] [address] [phone] (Phone) [phone] (Fax

## 2017-12-22 ENCOUNTER — TELEPHONE (OUTPATIENT)
Dept: INTERNAL MEDICINE | Facility: CLINIC | Age: 52
End: 2017-12-22

## 2017-12-22 RX ORDER — INSULIN PUMP SYRINGE, 3 ML
EACH MISCELLANEOUS
Qty: 1 EACH | Refills: 0 | Status: SHIPPED | OUTPATIENT
Start: 2017-12-22 | End: 2019-09-20

## 2017-12-26 ENCOUNTER — HOSPITAL ENCOUNTER (OUTPATIENT)
Facility: OTHER | Age: 52
Discharge: HOME | End: 2017-12-26
Attending: ANESTHESIOLOGY | Admitting: ANESTHESIOLOGY
Payer: MEDICARE

## 2017-12-26 ENCOUNTER — SURGERY (OUTPATIENT)
Age: 52
End: 2017-12-26

## 2017-12-26 VITALS
HEIGHT: 65 IN | HEART RATE: 96 BPM | BODY MASS INDEX: 48.82 KG/M2 | OXYGEN SATURATION: 100 % | RESPIRATION RATE: 18 BRPM | SYSTOLIC BLOOD PRESSURE: 138 MMHG | TEMPERATURE: 98 F | WEIGHT: 293 LBS | DIASTOLIC BLOOD PRESSURE: 73 MMHG

## 2017-12-26 DIAGNOSIS — M47.816 FACET ARTHRITIS OF LUMBAR REGION: ICD-10-CM

## 2017-12-26 DIAGNOSIS — M47.899 FACET SYNDROME: ICD-10-CM

## 2017-12-26 DIAGNOSIS — M47.816 SPONDYLOSIS OF LUMBAR REGION WITHOUT MYELOPATHY OR RADICULOPATHY: Primary | ICD-10-CM

## 2017-12-26 LAB — POCT GLUCOSE: 133 MG/DL (ref 70–110)

## 2017-12-26 PROCEDURE — 64635 DESTROY LUMB/SAC FACET JNT: CPT | Performed by: ANESTHESIOLOGY

## 2017-12-26 PROCEDURE — 82947 ASSAY GLUCOSE BLOOD QUANT: CPT | Performed by: ANESTHESIOLOGY

## 2017-12-26 PROCEDURE — 25000003 PHARM REV CODE 250: Performed by: ANESTHESIOLOGY

## 2017-12-26 PROCEDURE — 63600175 PHARM REV CODE 636 W HCPCS: Performed by: ANESTHESIOLOGY

## 2017-12-26 PROCEDURE — 64636 DESTROY L/S FACET JNT ADDL: CPT | Mod: LT,,, | Performed by: ANESTHESIOLOGY

## 2017-12-26 PROCEDURE — 99152 MOD SED SAME PHYS/QHP 5/>YRS: CPT | Mod: ,,, | Performed by: ANESTHESIOLOGY

## 2017-12-26 PROCEDURE — 64636 DESTROY L/S FACET JNT ADDL: CPT | Performed by: ANESTHESIOLOGY

## 2017-12-26 PROCEDURE — 64635 DESTROY LUMB/SAC FACET JNT: CPT | Mod: LT,,, | Performed by: ANESTHESIOLOGY

## 2017-12-26 RX ORDER — SODIUM CHLORIDE 9 MG/ML
INJECTION, SOLUTION INTRAVENOUS CONTINUOUS
Status: DISCONTINUED | OUTPATIENT
Start: 2017-12-26 | End: 2017-12-26 | Stop reason: HOSPADM

## 2017-12-26 RX ORDER — LIDOCAINE HYDROCHLORIDE 10 MG/ML
INJECTION INFILTRATION; PERINEURAL
Status: DISCONTINUED | OUTPATIENT
Start: 2017-12-26 | End: 2017-12-26 | Stop reason: HOSPADM

## 2017-12-26 RX ORDER — MIDAZOLAM HYDROCHLORIDE 1 MG/ML
INJECTION INTRAMUSCULAR; INTRAVENOUS
Status: DISCONTINUED | OUTPATIENT
Start: 2017-12-26 | End: 2017-12-26 | Stop reason: HOSPADM

## 2017-12-26 RX ORDER — BUPIVACAINE HYDROCHLORIDE 2.5 MG/ML
INJECTION, SOLUTION EPIDURAL; INFILTRATION; INTRACAUDAL
Status: DISCONTINUED | OUTPATIENT
Start: 2017-12-26 | End: 2017-12-26 | Stop reason: HOSPADM

## 2017-12-26 RX ORDER — FENTANYL CITRATE 50 UG/ML
INJECTION, SOLUTION INTRAMUSCULAR; INTRAVENOUS
Status: DISCONTINUED | OUTPATIENT
Start: 2017-12-26 | End: 2017-12-26 | Stop reason: HOSPADM

## 2017-12-26 RX ADMIN — LIDOCAINE HYDROCHLORIDE 10 ML: 10 INJECTION, SOLUTION INFILTRATION; PERINEURAL at 09:12

## 2017-12-26 RX ADMIN — BUPIVACAINE HYDROCHLORIDE 10 ML: 2.5 INJECTION, SOLUTION EPIDURAL; INFILTRATION; INTRACAUDAL; PERINEURAL at 09:12

## 2017-12-26 RX ADMIN — SODIUM CHLORIDE: 900 INJECTION, SOLUTION INTRAVENOUS at 07:12

## 2017-12-26 RX ADMIN — FENTANYL CITRATE 50 MCG: 50 INJECTION, SOLUTION INTRAMUSCULAR; INTRAVENOUS at 09:12

## 2017-12-26 RX ADMIN — MIDAZOLAM HYDROCHLORIDE 2 MG: 1 INJECTION, SOLUTION INTRAMUSCULAR; INTRAVENOUS at 09:12

## 2017-12-26 NOTE — DISCHARGE INSTRUCTIONS
Home Care Instructions Pain Management:    1. DIET:   You may resume your normal diet today.   2. BATHING:   You may shower with luke warm water.  3. DRESSING:   You may remove your bandage today.   4. ACTIVITY LEVEL:   You may resume your normal activities 24 hrs after your procedure.  5. MEDICATIONS:   You may resume your normal medications today.   6. SPECIAL INSTRUCTIONS:   No heat to the injection site for 24 hrs including, bath or shower, heating pad, moist heat, or hot tubs.    Use ice pack to injection site for any pain or discomfort.  Apply ice packs for 20 minute intervals as needed.   If you have received any sedatives by mouth today you may not drive for 12 hours.    If you have received any sedation through your IV, you may not drive for 24 hrs.     PLEASE CALL YOUR DOCTOR IF:  1. Redness or swelling around the injection site.  2. Fever of 101 degrees  3. Drainage (pus) from the injection site.  4. For any continuous bleeding (some dried blood over the incision is normal.)    FOR EMERGENCIES:   If any unusual problems or difficulties occur during clinic hours, call [phone] or [phone].     Thank you for allowing us to care for you today. You may receive a survey about the care we provided. Your feedback is valuable and helps us provide excellent care throughout the community.

## 2017-12-26 NOTE — H&P
"HPI  Patient for repeat left lumbar RFA, no other health changes since previous encounter.    PMHx, PSHx, Allergies, Medications reviewed in epic    ROS negative except pain complaints in HPI    OBJECTIVE:    BP (!) 155/57   Pulse 77   Temp 97.8 °F (36.6 °C) (Oral)   Resp 18   Ht 5' 5" (1.651 m)   Wt (!) 154.2 kg (340 lb)   SpO2 (!) 94%   BMI 56.58 kg/m²     PHYSICAL EXAMINATION:    GENERAL: Well appearing, in no acute distress, alert and oriented x3.  PSYCH:  Mood and affect appropriate.  SKIN: Skin color, texture, turgor normal, no rashes or lesions.  CV: RRR with palpation of the radial artery.  PULM: No evidence of respiratory difficulty, symmetric chest rise. Clear to auscultation.  NEURO: Cranial nerves grossly intact.    Plan:    Proceed with procedure as planned    Lina Wagner  12/26/2017    "

## 2017-12-26 NOTE — DISCHARGE SUMMARY
Discharge Note  Short Stay      SUMMARY     Admit Date: 12/26/2017    Attending Physician: Lina Wagner    Discharge Diagnosis: Lumbar spondylosis [M47.816]    Discharge Physician: Lina Wagner      Discharge Date: 12/26/2017 9:36 AM     PROCEDURE: Left radiofrequency ablation of the the medial branch nerves at the   transverse process of  L3, L4, L5 and sacral ala    2)Conscious sedation provided by MD     REASON FOR PROCEDURE: Lumbar spondylosis [M47.816]     Disposition: Home or self care    Patient Instructions:   Current Discharge Medication List      CONTINUE these medications which have NOT CHANGED    Details   albuterol 90 mcg/actuation inhaler Take 2 puffs every 4-6 hours  as needed for shortness of breath/wheezing  Qty: 1 Inhaler, Refills: 6    Associated Diagnoses: Asthma, well controlled, mild intermittent      aspirin (ECOTRIN) 81 MG EC tablet Take 1 tablet by mouth every morning. prevents heart attacks and strokes      atorvastatin (LIPITOR) 20 MG tablet Take 1 tablet (20 mg total) by mouth once daily.  Qty: 30 tablet, Refills: 6    Associated Diagnoses: Hyperlipidemia, unspecified hyperlipidemia type      !! blood sugar diagnostic Strp Freestyle light strips and lancets  Qty: 100 each, Refills: 6    Associated Diagnoses: Uncontrolled type 2 diabetes mellitus without complication, with long-term current use of insulin      !! blood sugar diagnostic Strp Check glucose four times daily Strips and lancets covered by insurance E11.9 - One touch  Qty: 120 each, Refills: 6      !! blood-glucose meter (FREESTYLE SYSTEM KIT) kit Use as instructed  Qty: 1 each, Refills: 0      !! blood-glucose meter kit Check glucose four times daily E11.9 meter covered by insurance [employer]  Qty: 1 each, Refills: 0      celecoxib (CELEBREX) 200 MG capsule Take 1 capsule (200 mg total) by mouth once daily.  Qty: 30 capsule, Refills: 2    Associated Diagnoses: Chronic pain of both knees      econazole nitrate 1 % cream  Apply topically 2 (two) times daily.  Qty: 30 g, Refills: 2      fluconazole (DIFLUCAN) 150 MG Tab       FLUoxetine (PROZAC) 20 MG capsule Take 1 capsule (20 mg total) by mouth once daily.  Qty: 30 capsule, Refills: 6      fluticasone (FLONASE) 50 mcg/actuation nasal spray 1 spray by Each Nare route 2 (two) times daily as needed for Rhinitis.  Qty: 15 g, Refills: 0      insulin detemir (LEVEMIR FLEXPEN) 100 unit/mL (3 mL) SubQ InPn pen Inject 30 Units into the skin every evening.  Qty: 1 Box, Refills: 6    Associated Diagnoses: Uncontrolled type 2 diabetes mellitus without complication, with long-term current use of insulin      insulin lispro (HUMALOG KWIKPEN) 100 unit/mL InPn pen 11 Units before meals plus sliding scale  Qty: 1 Box, Refills: 6      !! insulin lispro (HUMALOG) 100 unit/mL injection Inject 11 Units into the skin 3 (three) times daily before meals. Glucose        Intervention  (units to add in addition to meal time Humalog base dose of 11 Units)                        0-80    orange juice             0 units     151 - 200       +2 Units     201 - 250       +4 Units     251 - 300       +6 Units     301 - 350        +8 Units     351 - 400       +10 Units     > 400           Call MD  Qty: 3 mL, Refills: 11      !! insulin lispro (HUMALOG) 100 unit/mL injection 11 Units before meals plus sliding scale  Qty: 5 vial, Refills: 6      !! insulin lispro (HUMALOG) 100 unit/mL injection INJECT 11 UNITS SUBCUTANEOUSLY THREE TIMES DAILY BEFORE MEAL(S) PLUS  SLIDING  SCALE  Qty: 10 mL, Refills: 6    Associated Diagnoses: Uncontrolled type 2 diabetes mellitus without complication, with long-term current use of insulin      lisinopril (PRINIVIL,ZESTRIL) 2.5 MG tablet One daily for proteinuria.  Qty: 30 tablet, Refills: 6    Associated Diagnoses: Proteinuria, unspecified type      metFORMIN (GLUCOPHAGE-XR) 500 MG 24 hr tablet Take 2 tablets (1,000 mg total) by mouth 2 (two) times daily.  Qty: 360 tablet,  "Refills: 6    Associated Diagnoses: Uncontrolled type 2 diabetes mellitus without complication, with long-term current use of insulin      omeprazole (PRILOSEC) 20 MG capsule Take 1 capsule (20 mg total) by mouth every morning.  Qty: 30 capsule, Refills: 6    Associated Diagnoses: Gastroesophageal reflux disease without esophagitis      oxyCODONE-acetaminophen (PERCOCET)  mg per tablet Take 1 tablet by mouth every 6 (six) hours as needed for Pain.  Qty: 120 tablet, Refills: 0    Associated Diagnoses: Osteoarthritis, unspecified osteoarthritis type, unspecified site; Facet syndrome      pen needle, diabetic 33 gauge x 5/32" Ndle 1 application by Misc.(Non-Drug; Combo Route) route 4 (four) times daily with meals and nightly.  Qty: 100 each, Refills: 6      vitamin D 185 MG Tab Take 5,000 mg by mouth once daily.       !! - Potential duplicate medications found. Please discuss with provider.          Resume home diet and activity    "

## 2017-12-26 NOTE — OP NOTE
Lumbar Medial nerve branch block radiofrequency ablation Under Fluoroscopy     Time-out taken to identify patient and procedure side prior to starting the procedure.     12/26/2017    PROCEDURE: Left radiofrequency ablation of the the medial branch nerves at the   transverse process of  L3, L4, L5 and sacral ala    2)Conscious sedation provided by MD     REASON FOR PROCEDURE: Lumbar spondylosis [M47.816]     PHYSICIAN: Lina Wagner MD     ASSISTANTS: None     MEDICATIONS INJECTED: 0.25% Bupivicaine total 8mL     LOCAL ANESTHETIC USED: Xylocaine 1% 1mL / site     ESTIMATED BLOOD LOSS: None.     COMPLICATIONS: None.     Interval history: Patient reports that he had complete relief of pain for the day of the procedure, we will proceed with the RFA     TECHNIQUE: Laying in a prone position, the patient was prepped and draped in the usual sterile fashion using ChloraPrep and fenestrated drape. The level was determined under fluoroscopic guidance. Local anesthetic was given by going down to the hub of the 27-gauge 1.25in needle and raising a wheel. A 18-gauge 10mm curved active tip needle was introduced to the anatomic local of the medial branch at each of the above levels using fluoroscopy. Then sensory and motor testing was performed to confirm that the needle tips were in the correct location. Then after negative aspiration, 1 mL of 0.25% bupivacaine was injected into each level. This was followed by thermal lesioning at 80 degrees celsius for 90 seconds.     Conscious sedation provided by M.D   The patient was monitored with continuous pulse oximetry, EKG, and intermittent blood pressure monitors. The patient was hemodynamically stable throughout the entire process was responsive to voice, and breathing spontaneously. Supplemental O2 was provided at 2L/min via nasal cannula. Patient was comfortable for the duration of the procedure. (See nurse documentation and case log for sedation time)    There was a total of  2mg IV Midazolam and 50mcg Fentanyl titrated for the procedure    The patient tolerated the procedure well. Was able to move their leg at the knee and ankle at the conclusion of the procedure    The patient was monitored after the procedure. Patient was given post procedure and discharge instructions to follow at home. We will see the patient back in two weeks or the patient may call to inform of status. The patient was discharged in a stable condition

## 2017-12-27 ENCOUNTER — OFFICE VISIT (OUTPATIENT)
Dept: ORTHOPEDICS | Facility: CLINIC | Age: 52
End: 2017-12-27
Payer: MEDICARE

## 2017-12-27 ENCOUNTER — HOSPITAL ENCOUNTER (OUTPATIENT)
Dept: RADIOLOGY | Facility: HOSPITAL | Age: 52
Discharge: HOME | End: 2017-12-27
Attending: ORTHOPAEDIC SURGERY
Payer: MEDICARE

## 2017-12-27 VITALS — WEIGHT: 293 LBS | HEIGHT: 65 IN | BODY MASS INDEX: 48.82 KG/M2

## 2017-12-27 DIAGNOSIS — G89.29 CHRONIC PAIN OF BOTH KNEES: Primary | ICD-10-CM

## 2017-12-27 DIAGNOSIS — M25.562 CHRONIC PAIN OF BOTH KNEES: Primary | ICD-10-CM

## 2017-12-27 DIAGNOSIS — M25.561 PAIN IN BOTH KNEES, UNSPECIFIED CHRONICITY: ICD-10-CM

## 2017-12-27 DIAGNOSIS — M25.562 PAIN IN BOTH KNEES, UNSPECIFIED CHRONICITY: ICD-10-CM

## 2017-12-27 DIAGNOSIS — Z96.651 S/P REVISION OF TOTAL KNEE, RIGHT: ICD-10-CM

## 2017-12-27 DIAGNOSIS — Z96.652 STATUS POST TOTAL LEFT KNEE REPLACEMENT: ICD-10-CM

## 2017-12-27 DIAGNOSIS — M25.561 CHRONIC PAIN OF BOTH KNEES: Primary | ICD-10-CM

## 2017-12-27 PROCEDURE — 73562 X-RAY EXAM OF KNEE 3: CPT | Mod: TC,50

## 2017-12-27 PROCEDURE — 99999 PR PBB SHADOW E&M-EST. PATIENT-LVL III: CPT | Mod: PBBFAC,,, | Performed by: ORTHOPAEDIC SURGERY

## 2017-12-27 PROCEDURE — 73562 X-RAY EXAM OF KNEE 3: CPT | Mod: 26,59,LT, | Performed by: RADIOLOGY

## 2017-12-27 PROCEDURE — 73562 X-RAY EXAM OF KNEE 3: CPT | Mod: 26,RT,, | Performed by: RADIOLOGY

## 2017-12-27 PROCEDURE — 99213 OFFICE O/P EST LOW 20 MIN: CPT | Mod: PBBFAC,25 | Performed by: ORTHOPAEDIC SURGERY

## 2017-12-27 PROCEDURE — 99213 OFFICE O/P EST LOW 20 MIN: CPT | Mod: S$PBB,,, | Performed by: ORTHOPAEDIC SURGERY

## 2017-12-27 RX ORDER — DICLOFENAC SODIUM 10 MG/G
2 GEL TOPICAL DAILY
Qty: 1 TUBE | Refills: 3 | Status: SHIPPED | OUTPATIENT
Start: 2017-12-27 | End: 2018-12-22 | Stop reason: SDUPTHER

## 2017-12-27 NOTE — PROGRESS NOTES
"Subjective:      Patient ID: Alivia Thomas is a 52 y.o. female.    Chief Complaint: Pain of the Left Knee and Pain of the Right Knee    HPI  Alivia Thomas has bilateral knee pain.  The pain is unchanged. The pain is located in the anterior aspect of the knees.  There  is not radiation. There is associated stiffness.   There is not catching and locking. The pain is described as achy. The pain is aggravated by going from seated to standing.  It is alleviated by rest on occasion.  There is associated back pain.  Her history, medications and problem list were reviewed.    Review of Systems   Constitution: Negative for chills, fever and night sweats.   HENT: Negative for hearing loss.    Eyes: Negative for blurred vision and double vision.   Cardiovascular: Negative for chest pain, claudication and leg swelling.   Respiratory: Negative for shortness of breath.    Endocrine: Negative for polydipsia, polyphagia and polyuria.   Hematologic/Lymphatic: Negative for adenopathy and bleeding problem. Does not bruise/bleed easily.   Skin: Negative for poor wound healing.   Musculoskeletal: Positive for joint pain.   Gastrointestinal: Negative for diarrhea and heartburn.   Genitourinary: Negative for bladder incontinence.   Neurological: Negative for focal weakness, headaches, numbness, paresthesias and sensory change.   Psychiatric/Behavioral: The patient is not nervous/anxious.    Allergic/Immunologic: Negative for persistent infections.         Objective:      Body mass index is 56.57 kg/m².  Vitals:    12/27/17 1529   Weight: (!) 154.2 kg (339 lb 15.2 oz)   Height: 5' 5" (1.651 m)           General    Constitutional: She is oriented to person, place, and time. She appears well-developed and well-nourished.   obese   HENT:   Head: Normocephalic and atraumatic.   Eyes: EOM are normal.   Cardiovascular: Normal rate and regular rhythm.    Pulmonary/Chest: Effort normal.   Neurological: She is alert and oriented to person, " place, and time.   Psychiatric: She has a normal mood and affect. Her behavior is normal.     General Musculoskeletal Exam   Gait: normal       Right Knee Exam     Inspection   Erythema: absent  Scars: present  Swelling: absent  Effusion: effusion  Deformity: deformity  Bruising: absent    Tenderness   The patient is tender to palpation of the patella.    Range of Motion   Extension: 0   Flexion: 120     Tests   Ligament Examination Lachman: normal (-1 to 2mm)   MCL - Valgus: normal (0 to 2mm)  LCL - Varus: normal  Patella   Passive Patellar Tilt: neutral    Other   Sensation: normal    Left Knee Exam     Inspection   Erythema: absent  Scars: present  Swelling: absent  Effusion: absent  Deformity: deformity  Bruising: absent    Tenderness   The patient tender to palpation of the patella.    Range of Motion   Extension: 0   Flexion: 120     Tests   Stability Lachman: normal (-1 to 2mm)   MCL - Valgus: normal (0 to 2mm)  LCL - Varus: normal (0 to 2mm)  Patella   Passive Patellar Tilt: neutral    Other   Sensation: normal    Muscle Strength   Right Lower Extremity   Hip Abduction: 5/5   Quadriceps:  5/5   Hamstrin/5   Left Lower Extremity   Hip Abduction: 5/5   Quadriceps:  5/5   Hamstrin/5     Reflexes     Left Side  Quadriceps:  2+    Right Side   Quadriceps:  2+    Vascular Exam     Right Pulses  Dorsalis Pedis:      2+          Left Pulses  Dorsalis Pedis:      2+          Edema  Right Lower Leg: absent  Left Lower Leg: absent    Radiographs taken today were reviewed by me.  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  The [id number] are well positioned.  Fragmentation of the patellae          Assessment:       Encounter Diagnoses   Name Primary?    Chronic pain of both knees Yes    S/P revision of total knee, right     Status post total left knee replacement           Plan:       Alivia was seen today for pain and pain.    Diagnoses and all orders for this visit:    Chronic pain of both  knees    S/P revision of total knee, right    Status post total left knee replacement    Other orders  -     diclofenac sodium (VOLTAREN) 1 % Gel; Apply 2 g topically once daily.      lAivia Thomas was given a prescription for Voltaren.  The patient was given instructions on how to take the medication and side effects were discussed.  F/U in three months.

## 2017-12-29 DIAGNOSIS — R80.9 PROTEINURIA, UNSPECIFIED TYPE: ICD-10-CM

## 2017-12-29 DIAGNOSIS — E78.5 HYPERLIPIDEMIA, UNSPECIFIED HYPERLIPIDEMIA TYPE: ICD-10-CM

## 2017-12-30 RX ORDER — ATORVASTATIN CALCIUM 20 MG/1
20 TABLET, FILM COATED ORAL DAILY
Qty: 30 TABLET | Refills: 6 | Status: SHIPPED | OUTPATIENT
Start: 2017-12-30 | End: 2018-03-06 | Stop reason: SDUPTHER

## 2017-12-30 RX ORDER — LISINOPRIL 2.5 MG/1
TABLET ORAL
Qty: 30 TABLET | Refills: 6 | Status: SHIPPED | OUTPATIENT
Start: 2017-12-30 | End: 2018-03-06 | Stop reason: SDUPTHER

## 2018-01-03 ENCOUNTER — TELEPHONE (OUTPATIENT)
Dept: PAIN MEDICINE | Facility: CLINIC | Age: 53
End: 2018-01-03

## 2018-01-03 DIAGNOSIS — M19.90 OSTEOARTHRITIS, UNSPECIFIED OSTEOARTHRITIS TYPE, UNSPECIFIED SITE: ICD-10-CM

## 2018-01-03 DIAGNOSIS — M47.899 FACET SYNDROME: ICD-10-CM

## 2018-01-03 NOTE — TELEPHONE ENCOUNTER
----- Message from Jane Clements sent at 1/3/2018  1:07 PM CST -----  Contact: pt  x_ 1st Request  _ 2nd Request  _ 3rd Request    Who: pt    Why: in regards to a RX     What Number to Call Back: [phone]    When to Expect a call back: (Before the end of the day)  -- if call after 3:00 call back will be tomorrow.

## 2018-01-03 NOTE — TELEPHONE ENCOUNTER
Spoke with patient, stated she is requesting a pain medication refill, refill request sent to Dr Wagner at this time

## 2018-01-03 NOTE — TELEPHONE ENCOUNTER
----- Message from Jane Clements sent at 1/3/2018  1:07 PM CST -----  Contact: pt  x_ 1st Request  _ 2nd Request  _ 3rd Request    Who: pt    Why: in regards to a RX     What Number to Call Back: [phone]    When to Expect a call back: (Before the end of the day)  -- if call after 3:00 call back will be tomorrow.

## 2018-01-04 RX ORDER — OXYCODONE AND ACETAMINOPHEN 10; 325 MG/1; MG/1
1 TABLET ORAL EVERY 6 HOURS PRN
Qty: 120 TABLET | Refills: 0 | Status: SHIPPED | OUTPATIENT
Start: 2018-01-05 | End: 2018-02-06 | Stop reason: SDUPTHER

## 2018-01-05 DIAGNOSIS — K21.9 GASTROESOPHAGEAL REFLUX DISEASE WITHOUT ESOPHAGITIS: ICD-10-CM

## 2018-01-05 RX ORDER — OMEPRAZOLE 20 MG/1
20 CAPSULE, DELAYED RELEASE ORAL EVERY MORNING
Qty: 30 CAPSULE | Refills: 6 | Status: SHIPPED | OUTPATIENT
Start: 2018-01-05 | End: 2018-03-06 | Stop reason: SDUPTHER

## 2018-01-09 ENCOUNTER — SURGERY (OUTPATIENT)
Age: 53
End: 2018-01-09

## 2018-01-09 ENCOUNTER — HOSPITAL ENCOUNTER (OUTPATIENT)
Facility: OTHER | Age: 53
Discharge: HOME | End: 2018-01-09
Attending: ANESTHESIOLOGY | Admitting: ANESTHESIOLOGY
Payer: MEDICARE

## 2018-01-09 VITALS
BODY MASS INDEX: 48.82 KG/M2 | DIASTOLIC BLOOD PRESSURE: 64 MMHG | RESPIRATION RATE: 16 BRPM | TEMPERATURE: 98 F | WEIGHT: 293 LBS | OXYGEN SATURATION: 94 % | SYSTOLIC BLOOD PRESSURE: 123 MMHG | HEART RATE: 70 BPM | HEIGHT: 65 IN

## 2018-01-09 DIAGNOSIS — M47.816 FACET ARTHRITIS OF LUMBAR REGION: ICD-10-CM

## 2018-01-09 DIAGNOSIS — M47.816 SPONDYLOSIS OF LUMBAR REGION WITHOUT MYELOPATHY OR RADICULOPATHY: Primary | ICD-10-CM

## 2018-01-09 DIAGNOSIS — M47.899 FACET SYNDROME: ICD-10-CM

## 2018-01-09 DIAGNOSIS — M17.0 PRIMARY OSTEOARTHRITIS OF BOTH KNEES: ICD-10-CM

## 2018-01-09 LAB — POCT GLUCOSE: 156 MG/DL (ref 70–110)

## 2018-01-09 PROCEDURE — 64636 DESTROY L/S FACET JNT ADDL: CPT | Mod: RT,,, | Performed by: ANESTHESIOLOGY

## 2018-01-09 PROCEDURE — 63600175 PHARM REV CODE 636 W HCPCS: Performed by: ANESTHESIOLOGY

## 2018-01-09 PROCEDURE — 82947 ASSAY GLUCOSE BLOOD QUANT: CPT | Performed by: ANESTHESIOLOGY

## 2018-01-09 PROCEDURE — 25000003 PHARM REV CODE 250: Performed by: ANESTHESIOLOGY

## 2018-01-09 PROCEDURE — 64635 DESTROY LUMB/SAC FACET JNT: CPT | Mod: RT,,, | Performed by: ANESTHESIOLOGY

## 2018-01-09 PROCEDURE — 99152 MOD SED SAME PHYS/QHP 5/>YRS: CPT | Mod: ,,, | Performed by: ANESTHESIOLOGY

## 2018-01-09 PROCEDURE — 64636 DESTROY L/S FACET JNT ADDL: CPT | Performed by: ANESTHESIOLOGY

## 2018-01-09 PROCEDURE — 64635 DESTROY LUMB/SAC FACET JNT: CPT | Performed by: ANESTHESIOLOGY

## 2018-01-09 RX ORDER — LIDOCAINE HYDROCHLORIDE 10 MG/ML
INJECTION INFILTRATION; PERINEURAL
Status: DISCONTINUED | OUTPATIENT
Start: 2018-01-09 | End: 2018-01-09 | Stop reason: HOSPADM

## 2018-01-09 RX ORDER — BUPIVACAINE HYDROCHLORIDE 2.5 MG/ML
INJECTION, SOLUTION EPIDURAL; INFILTRATION; INTRACAUDAL
Status: DISCONTINUED | OUTPATIENT
Start: 2018-01-09 | End: 2018-01-09 | Stop reason: HOSPADM

## 2018-01-09 RX ORDER — MIDAZOLAM HYDROCHLORIDE 1 MG/ML
INJECTION INTRAMUSCULAR; INTRAVENOUS
Status: DISCONTINUED | OUTPATIENT
Start: 2018-01-09 | End: 2018-01-09 | Stop reason: HOSPADM

## 2018-01-09 RX ORDER — SODIUM CHLORIDE 9 MG/ML
INJECTION, SOLUTION INTRAVENOUS CONTINUOUS
Status: DISCONTINUED | OUTPATIENT
Start: 2018-01-09 | End: 2018-01-09 | Stop reason: HOSPADM

## 2018-01-09 RX ORDER — FENTANYL CITRATE 50 UG/ML
INJECTION, SOLUTION INTRAMUSCULAR; INTRAVENOUS
Status: DISCONTINUED | OUTPATIENT
Start: 2018-01-09 | End: 2018-01-09 | Stop reason: HOSPADM

## 2018-01-09 RX ADMIN — BUPIVACAINE HYDROCHLORIDE 10 ML: 2.5 INJECTION, SOLUTION EPIDURAL; INFILTRATION; INTRACAUDAL; PERINEURAL at 09:01

## 2018-01-09 RX ADMIN — FENTANYL CITRATE 50 MCG: 50 INJECTION, SOLUTION INTRAMUSCULAR; INTRAVENOUS at 09:01

## 2018-01-09 RX ADMIN — FENTANYL CITRATE 50 MCG: 50 INJECTION, SOLUTION INTRAMUSCULAR; INTRAVENOUS at 10:01

## 2018-01-09 RX ADMIN — MIDAZOLAM HYDROCHLORIDE 1 MG: 1 INJECTION, SOLUTION INTRAMUSCULAR; INTRAVENOUS at 09:01

## 2018-01-09 RX ADMIN — LIDOCAINE HYDROCHLORIDE 10 ML: 10 INJECTION, SOLUTION INFILTRATION; PERINEURAL at 09:01

## 2018-01-09 RX ADMIN — MIDAZOLAM HYDROCHLORIDE 1 MG: 1 INJECTION, SOLUTION INTRAMUSCULAR; INTRAVENOUS at 10:01

## 2018-01-09 RX ADMIN — SODIUM CHLORIDE: 900 INJECTION, SOLUTION INTRAVENOUS at 08:01

## 2018-01-09 NOTE — DISCHARGE INSTRUCTIONS
Home Care Instructions Pain Management:    1. DIET:   You may resume your normal diet today.   2. BATHING:   You may shower with luke warm water.  3. DRESSING:   You may remove your bandage today.   4. ACTIVITY LEVEL:   You may resume your normal activities 24 hrs after your procedure.  5. MEDICATIONS:   You may resume your normal medications today.   6. SPECIAL INSTRUCTIONS:   No heat to the injection site for 24 hrs including, bath or shower, heating pad, moist heat, or hot tubs.    Use ice pack to injection site for any pain or discomfort.  Apply ice packs for 20 minute intervals as needed.   If you have received any sedatives by mouth today you may not drive for 12 hours.    If you have received any sedation through your IV, you may not drive for 24 hrs.     PLEASE CALL YOUR DOCTOR IF:  1. Redness or swelling around the injection site.  2. Fever of 101 degrees  3. Drainage (pus) from the injection site.  4. For any continuous bleeding (some dried blood over the incision is normal.)    FOR EMERGENCIES:   If any unusual problems or difficulties occur during clinic hours, call [phone] or [phone].   Procedural Sedation  Procedural sedation is medicine to ease discomfort, pain, and anxiety during a procedure. The medicine is often given through an intravenous (IV) line in your arm or hand. In some cases, the medicine may be taken by mouth or inhaled. While you are under sedation, you will likely be awake. But you may not remember anything afterward.  Why procedural sedation is used  Sedation is used for many types of procedures. The goal is to reduce pain, anxiety, and stressful memories of a procedure. It can also help your health care provider treat you. For example, having a broken bone fixed may be easier if you feel relaxed.  Procedural sedation is used only for short, basic procedures. It is not used for complex surgeries. Some procedures that use this type of sedation include:  · Dental surgery  · Breast  biopsy, to take a sample of breast tissue  · Endoscopy, to look at gastrointestinal problems  · Bronchoscopy, to check for lung problems  · Bone or joint realignment, to fix a broken bone or dislocated joint  · Minor foot or skin surgery  · Electrical cardioversion, to restore a normal heart rhythm  · Lumbar puncture, to assess neurological disease  Risks of procedural sedation  Procedural sedation has some risks and possible side effects, such as:  · Headache  · Nausea and vomiting  · Unpleasant memory of the procedure  · Lowered rate of breathing  · Changes in heart rate and blood pressure (rare)  · Inhalation of stomach contents into your lungs (rare)  Side effects will likely go away shortly after the procedure. Your health care team will watch your heart rate and breathing during your sedation. This is to help prevent problems.  Your own risks may vary based on your age and your overall health. They also depend on the type of sedation you are given. Talk with your health care provider about the risks that apply most to you.  Getting ready for procedural sedation  Talk with your health care provider how to get ready for your procedure. Tell him or her about all the medicines you take. This includes over-the-counter medicines such as ibuprofen. It also includes vitamins, herbs, and other supplements. You may need to stop taking some medicines before the procedure, such as blood thinners and aspirin. If you smoke, you may need to stop. Talk with your health care provider if you need help to stop smoking.  Tell your health care provider if you:  · Have had any problems in the past with sedation or anesthesia  · Have had any recent changes in your health, such as an infection or fever  · Are pregnant or think you may be pregnant  Also, make sure to:  · Ask a family member or friend to take you home after the procedure. You cannot drive on the day you receive sedation.  · Not eat or drink after midnight the night  before your procedure, if advised.  · Follow all other instructions from your health care provider.  During your procedural sedation  You may have your procedure in a hospital or a medical clinic. Sedation is done by a trained health care provider. In general, you can expect the following:  · You will be given medicine through an IV line in your arm or hand. Or you may receive a shot. The medicine may also be given by mouth. Or you may inhale it through a mask.  · If you receive medicine through an IV, you may feel the effects very quickly. You will start to feel relaxed and drowsy.  · During the procedure, your heart rate, breathing, and blood pressure will be closely watched. Your breathing and blood pressure may decrease a little. But you will likely not need help with your breathing. You may receive a little extra oxygen through a mask.  · You will probably be awake the entire time. If you do fall asleep, you should be easy to wake up, if needed. You should feel little or no pain.  · When your procedure is over, the sedative medicine will be stopped.  After your procedural sedation  You will begin to feel more awake and aware. But you will likely be drowsy for a while afterward. You will be closely watched as you become more alert. You may have a faint memory of the procedure. Or you may not remember it at all.  You should be able to return home within an hour or two after your procedure. Plan to have someone stay with you for a few hours. Side effects such as headache and nausea may go away quickly. Tell your health care provider if they continue.  Dont drive or make any important decisions for at least 24 hours. Be sure to follow all after-care instructions.      When to call your health care provider  Have someone call your health care provider right away if you have any of these:  · Drowsiness that gets worse  · Weakness or dizziness that gets worse  · Repeated vomiting  · You cant be awakened   Date Last  Reviewed: 2/6/2015  © 2655-5968 [employer]. [address] [place] [address], [place] [address]. All rights reserved. This information is not intended as a substitute for professional medical care. Always follow your healthcare professional's instructions.           Thank you for allowing us to care for you today. You may receive a survey about the care we provided. Your feedback is valuable and helps us provide excellent care throughout the community.

## 2018-01-09 NOTE — H&P
"HPI Patient is a 53y/o woman with known lumbar radiculopathy here for right lumbar RFA.    Patient had Left lumbar RFA 2 weeks ago and has 33% relief from it.      PMHx, PSHx, Allergies, Medications reviewed in epic    ROS negative except pain complaints in HPI    OBJECTIVE:    BP (!) 146/81   Pulse 74   Temp 98.4 °F (36.9 °C) (Oral)   Resp 18   Ht 5' 5" (1.651 m)   Wt (!) 149.7 kg (330 lb)   SpO2 98%   Breastfeeding? No   BMI 54.91 kg/m²     PHYSICAL EXAMINATION:    GENERAL: Well appearing, in no acute distress, alert and oriented x3.  PSYCH:  Mood and affect appropriate.  SKIN: Skin color, texture, turgor normal, no rashes or lesions.  CV: RRR with palpation of the radial artery.  PULM: No evidence of respiratory difficulty, symmetric chest rise. Clear to auscultation.  NEURO: Cranial nerves grossly intact.    Plan:    Proceed with procedure as planned    Sania Salazar  01/09/2018    "

## 2018-01-09 NOTE — DISCHARGE SUMMARY
Discharge Note  Short Stay      SUMMARY     Admit Date: 1/9/2018    Attending Physician: Lina Wagner    Discharge Diagnosis: Lumbar spondylosis [M47.816]    Discharge Physician: Lina Wagner      Discharge Date: 1/9/2018 10:10 AM     PROCEDURE: Right radiofrequency ablation of the the medial branch nerves at the   transverse process of  L3, L4, L5 and sacral ala    2)Conscious sedation provided by MD     REASON FOR PROCEDURE: Lumbar spondylosis [M47.816]    Disposition: Home or self care    Patient Instructions:   Current Discharge Medication List      CONTINUE these medications which have NOT CHANGED    Details   albuterol 90 mcg/actuation inhaler Take 2 puffs every 4-6 hours  as needed for shortness of breath/wheezing  Qty: 1 Inhaler, Refills: 6    Associated Diagnoses: Asthma, well controlled, mild intermittent      aspirin (ECOTRIN) 81 MG EC tablet Take 1 tablet by mouth every morning. prevents heart attacks and strokes      !! atorvastatin (LIPITOR) 20 MG tablet Take 1 tablet (20 mg total) by mouth once daily.  Qty: 30 tablet, Refills: 6    Associated Diagnoses: Hyperlipidemia, unspecified hyperlipidemia type      !! atorvastatin (LIPITOR) 20 MG tablet TAKE 1 TABLET (20 MG TOTAL) BY MOUTH ONCE DAILY.  Qty: 30 tablet, Refills: 6    Associated Diagnoses: Hyperlipidemia, unspecified hyperlipidemia type      !! blood sugar diagnostic Strp Freestyle light strips and lancets  Qty: 100 each, Refills: 6    Associated Diagnoses: Uncontrolled type 2 diabetes mellitus without complication, with long-term current use of insulin      !! blood sugar diagnostic Strp Check glucose four times daily Strips and lancets covered by insurance E11.9 - One touch  Qty: 120 each, Refills: 6      !! blood-glucose meter (FREESTYLE SYSTEM KIT) kit Use as instructed  Qty: 1 each, Refills: 0      !! blood-glucose meter kit Check glucose four times daily E11.9 meter covered by insurance One Touch  Qty: 1 each, Refills: 0       celecoxib (CELEBREX) 200 MG capsule Take 1 capsule (200 mg total) by mouth once daily.  Qty: 30 capsule, Refills: 2    Associated Diagnoses: Chronic pain of both knees      diclofenac sodium (VOLTAREN) 1 % Gel Apply 2 g topically once daily.  Qty: 1 Tube, Refills: 3      econazole nitrate 1 % cream Apply topically 2 (two) times daily.  Qty: 30 g, Refills: 2      fluconazole (DIFLUCAN) 150 MG Tab       FLUoxetine (PROZAC) 20 MG capsule Take 1 capsule (20 mg total) by mouth once daily.  Qty: 30 capsule, Refills: 6      fluticasone (FLONASE) 50 mcg/actuation nasal spray 1 spray by Each Nare route 2 (two) times daily as needed for Rhinitis.  Qty: 15 g, Refills: 0      insulin detemir (LEVEMIR FLEXPEN) 100 unit/mL (3 mL) SubQ InPn pen Inject 30 Units into the skin every evening.  Qty: 1 Box, Refills: 6    Associated Diagnoses: Uncontrolled type 2 diabetes mellitus without complication, with long-term current use of insulin      insulin lispro (HUMALOG KWIKPEN) 100 unit/mL InPn pen 11 Units before meals plus sliding scale  Qty: 1 Box, Refills: 6      !! insulin lispro (HUMALOG) 100 unit/mL injection Inject 11 Units into the skin 3 (three) times daily before meals. Glucose        Intervention  (units to add in addition to meal time Humalog base dose of 11 Units)                        0-80    orange juice             0 units     151 - 200       +2 Units     201 - 250       +4 Units     251 - 300       +6 Units     301 - 350        +8 Units     351 - 400       +10 Units     > 400           Call MD  Qty: 3 mL, Refills: 11      !! insulin lispro (HUMALOG) 100 unit/mL injection 11 Units before meals plus sliding scale  Qty: 5 vial, Refills: 6      !! insulin lispro (HUMALOG) 100 unit/mL injection INJECT 11 UNITS SUBCUTANEOUSLY THREE TIMES DAILY BEFORE MEAL(S) PLUS  SLIDING  SCALE  Qty: 10 mL, Refills: 6    Associated Diagnoses: Uncontrolled type 2 diabetes mellitus without complication, with long-term current use of  "insulin      !! lisinopril (PRINIVIL,ZESTRIL) 2.5 MG tablet One daily for proteinuria.  Qty: 30 tablet, Refills: 6    Associated Diagnoses: Proteinuria, unspecified type      !! lisinopril (PRINIVIL,ZESTRIL) 2.5 MG tablet ONE DAILY FOR PROTEINURIA.  Qty: 30 tablet, Refills: 6    Associated Diagnoses: Proteinuria, unspecified type      metFORMIN (GLUCOPHAGE-XR) 500 MG 24 hr tablet Take 2 tablets (1,000 mg total) by mouth 2 (two) times daily.  Qty: 360 tablet, Refills: 6    Associated Diagnoses: Uncontrolled type 2 diabetes mellitus without complication, with long-term current use of insulin      !! omeprazole (PRILOSEC) 20 MG capsule Take 1 capsule (20 mg total) by mouth every morning.  Qty: 30 capsule, Refills: 6    Associated Diagnoses: Gastroesophageal reflux disease without esophagitis      !! omeprazole (PRILOSEC) 20 MG capsule TAKE 1 CAPSULE (20 MG TOTAL) BY MOUTH EVERY MORNING.  Qty: 30 capsule, Refills: 6    Associated Diagnoses: Gastroesophageal reflux disease without esophagitis      oxyCODONE-acetaminophen (PERCOCET)  mg per tablet Take 1 tablet by mouth every 6 (six) hours as needed for Pain.  Qty: 120 tablet, Refills: 0    Associated Diagnoses: Osteoarthritis, unspecified osteoarthritis type, unspecified site; Facet syndrome      pen needle, diabetic 33 gauge x 5/32" Ndle 1 application by Misc.(Non-Drug; Combo Route) route 4 (four) times daily with meals and nightly.  Qty: 100 each, Refills: 6      vitamin D 185 MG Tab Take 5,000 mg by mouth once daily.       !! - Potential duplicate medications found. Please discuss with provider.          Resume home diet and activity    "

## 2018-01-09 NOTE — OP NOTE
Lumbar Medial nerve branch block radiofrequency ablation Under Fluoroscopy     Time-out taken to identify patient and procedure side prior to starting the procedure.     01/09/2018    PROCEDURE: Right radiofrequency ablation of the the medial branch nerves at the   transverse process of  L3, L4, L5 and sacral ala    2)Conscious sedation provided by MD     REASON FOR PROCEDURE: Lumbar spondylosis [M47.816]     PHYSICIAN: Lina Wagner MD     Assistants:  Sania Salazar MD PGY-3  I was present and supervising all critical portions of the procedure      MEDICATIONS INJECTED: 0.25% Bupivicaine total 8mL     LOCAL ANESTHETIC USED: Xylocaine 1% 1mL / site     ESTIMATED BLOOD LOSS: None.     COMPLICATIONS: None.     Interval history: Patient reports that he had complete relief of pain for the day of the procedure, we will proceed with the RFA     TECHNIQUE: Laying in a prone position, the patient was prepped and draped in the usual sterile fashion using ChloraPrep and fenestrated drape. The level was determined under fluoroscopic guidance. Local anesthetic was given by going down to the hub of the 27-gauge 1.25in needle and raising a wheel. A 18-gauge 10mm curved active tip needle was introduced to the anatomic local of the medial branch at each of the above levels using fluoroscopy. Then sensory and motor testing was performed to confirm that the needle tips were in the correct location. Then after negative aspiration, 1 mL of 0.25% bupivacaine was injected into each level. This was followed by thermal lesioning at 80 degrees celsius for 90 seconds.     Conscious sedation provided by M.D   The patient was monitored with continuous pulse oximetry, EKG, and intermittent blood pressure monitors. The patient was hemodynamically stable throughout the entire process was responsive to voice, and breathing spontaneously. Supplemental O2 was provided at 2L/min via nasal cannula. Patient was comfortable for the duration of  the procedure. (See nurse documentation and case log for sedation time)    There was a total of 2mg IV Midazolam and 100mcg Fentanyl titrated for the procedure    The patient tolerated the procedure well. Was able to move their leg at the knee and ankle at the conclusion of the procedure    The patient was monitored after the procedure. Patient was given post procedure and discharge instructions to follow at home. We will see the patient back in two weeks or the patient may call to inform of status. The patient was discharged in a stable condition

## 2018-01-10 ENCOUNTER — OFFICE VISIT (OUTPATIENT)
Dept: PODIATRY | Facility: CLINIC | Age: 53
End: 2018-01-10
Payer: MEDICARE

## 2018-01-10 VITALS
WEIGHT: 293 LBS | HEIGHT: 65 IN | DIASTOLIC BLOOD PRESSURE: 85 MMHG | SYSTOLIC BLOOD PRESSURE: 156 MMHG | BODY MASS INDEX: 48.82 KG/M2 | HEART RATE: 81 BPM

## 2018-01-10 DIAGNOSIS — E11.49 TYPE II DIABETES MELLITUS WITH NEUROLOGICAL MANIFESTATIONS: Primary | ICD-10-CM

## 2018-01-10 DIAGNOSIS — B35.1 DERMATOPHYTOSIS OF NAIL: ICD-10-CM

## 2018-01-10 DIAGNOSIS — L84 CORNS AND CALLUS: ICD-10-CM

## 2018-01-10 PROCEDURE — 99213 OFFICE O/P EST LOW 20 MIN: CPT | Mod: PBBFAC,25 | Performed by: PODIATRIST

## 2018-01-10 PROCEDURE — 99999 PR PBB SHADOW E&M-EST. PATIENT-LVL III: CPT | Mod: PBBFAC,,, | Performed by: PODIATRIST

## 2018-01-10 PROCEDURE — 11056 PARNG/CUTG B9 HYPRKR LES 2-4: CPT | Mod: Q9,S$PBB,, | Performed by: PODIATRIST

## 2018-01-10 PROCEDURE — 99499 UNLISTED E&M SERVICE: CPT | Mod: S$PBB,,, | Performed by: PODIATRIST

## 2018-01-10 PROCEDURE — 11721 DEBRIDE NAIL 6 OR MORE: CPT | Mod: Q9,PBBFAC | Performed by: PODIATRIST

## 2018-01-10 NOTE — PROGRESS NOTES
Chief Complaint   Patient presents with    Diabetes Mellitus     bilateral pcp Dr Herndon 12/18/17    Diabetic Foot Exam    Nail Care           HPI:   The patient is a 52 y.o.  female  who presents for a diabetic foot exam.   Patient reports + presence of abnormal sensation to the feet, just sometimes.     Patient has no history of wounds on the feet.   Hx of foot surgery: none.   Shoe gear: athletic type  This patient has documented high risk feet requiring routine maintenance secondary to diabetes.  Main concern today is toenail debridement.        Primary care doctor is: Clarisse Richardson MD  Patient last saw primary care doctor on:   12/18/17  Chief Complaint   Patient presents with    Diabetes Mellitus     bilateral pcp Dr Herndon 12/18/17    Diabetic Foot Exam    Nail Care              Past Medical History:   Diagnosis Date    Asthma     Diabetes mellitus type II     DJD (degenerative joint disease) of knee 6/19/2014    Hyperlipidemia     Morbid obesity     Sleep apnea          Current Outpatient Prescriptions on File Prior to Visit   Medication Sig Dispense Refill    albuterol 90 mcg/actuation inhaler Take 2 puffs every 4-6 hours  as needed for shortness of breath/wheezing 1 Inhaler 6    aspirin (ECOTRIN) 81 MG EC tablet Take 1 tablet by mouth every morning. prevents heart attacks and strokes      atorvastatin (LIPITOR) 20 MG tablet Take 1 tablet (20 mg total) by mouth once daily. 30 tablet 6    atorvastatin (LIPITOR) 20 MG tablet TAKE 1 TABLET (20 MG TOTAL) BY MOUTH ONCE DAILY. 30 tablet 6    blood sugar diagnostic Strp Freestyle light strips and lancets 100 each 6    blood sugar diagnostic Strp Check glucose four times daily Strips and lancets covered by insurance E11.9 - One touch 120 each 6    blood-glucose meter (FREESTYLE SYSTEM KIT) kit Use as instructed 1 each 0    blood-glucose meter kit Check glucose four times daily E11.9 meter covered by insurance One Touch 1 each 0     celecoxib (CELEBREX) 200 MG capsule Take 1 capsule (200 mg total) by mouth once daily. 30 capsule 2    diclofenac sodium (VOLTAREN) 1 % Gel Apply 2 g topically once daily. 1 Tube 3    econazole nitrate 1 % cream Apply topically 2 (two) times daily. 30 g 2    fluconazole (DIFLUCAN) 150 MG Tab       FLUoxetine (PROZAC) 20 MG capsule Take 1 capsule (20 mg total) by mouth once daily. 30 capsule 6    fluticasone (FLONASE) 50 mcg/actuation nasal spray 1 spray by Each Nare route 2 (two) times daily as needed for Rhinitis. 15 g 0    insulin detemir (LEVEMIR FLEXPEN) 100 unit/mL (3 mL) SubQ InPn pen Inject 30 Units into the skin every evening. 1 Box 6    insulin lispro (HUMALOG KWIKPEN) 100 unit/mL InPn pen 11 Units before meals plus sliding scale 1 Box 6    insulin lispro (HUMALOG) 100 unit/mL injection Inject 11 Units into the skin 3 (three) times daily before meals. Glucose        Intervention  (units to add in addition to meal time Humalog base dose of 11 Units)                        0-80    orange juice             0 units     151 - 200       +2 Units     201 - 250       +4 Units     251 - 300       +6 Units     301 - 350        +8 Units     351 - 400       +10 Units     > 400           Call MD 3 mL 11    insulin lispro (HUMALOG) 100 unit/mL injection 11 Units before meals plus sliding scale 5 vial 6    insulin lispro (HUMALOG) 100 unit/mL injection INJECT 11 UNITS SUBCUTANEOUSLY THREE TIMES DAILY BEFORE MEAL(S) PLUS  SLIDING  SCALE 10 mL 6    lisinopril (PRINIVIL,ZESTRIL) 2.5 MG tablet One daily for proteinuria. 30 tablet 6    lisinopril (PRINIVIL,ZESTRIL) 2.5 MG tablet ONE DAILY FOR PROTEINURIA. 30 tablet 6    metFORMIN (GLUCOPHAGE-XR) 500 MG 24 hr tablet Take 2 tablets (1,000 mg total) by mouth 2 (two) times daily. 360 tablet 6    omeprazole (PRILOSEC) 20 MG capsule Take 1 capsule (20 mg total) by mouth every morning. 30 capsule 6    omeprazole (PRILOSEC) 20 MG capsule TAKE 1 CAPSULE (20 MG  "TOTAL) BY MOUTH EVERY MORNING. 30 capsule 6    oxyCODONE-acetaminophen (PERCOCET)  mg per tablet Take 1 tablet by mouth every 6 (six) hours as needed for Pain. 120 tablet 0    pen needle, diabetic 33 gauge x 5/32" Ndle 1 application by Misc.(Non-Drug; Combo Route) route 4 (four) times daily with meals and nightly. 100 each 6    vitamin D 185 MG Tab Take 5,000 mg by mouth once daily.       Current Facility-Administered Medications on File Prior to Visit   Medication Dose Route Frequency Provider Last Rate Last Dose    [DISCONTINUED] 0.9%  NaCl infusion   Intravenous Continuous Lina Wagner MD 20 mL/hr at 01/09/18 0830      [DISCONTINUED] bupivacaine (PF) 0.25% (2.5 mg/ml) injection    PRN Lina Wagner MD   10 mL at 01/09/18 0956    [DISCONTINUED] fentaNYL injection    PRN Lina Wagner MD   50 mcg at 01/09/18 1004    [DISCONTINUED] lidocaine HCL 10 mg/ml (1%) injection    PRN Lina Wagner MD   10 mL at 01/09/18 0956    [DISCONTINUED] midazolam (VERSED) 1 mg/mL injection    PRN Lina Wagner MD   1 mg at 01/09/18 1004       Review of patient's allergies indicates:  No Known Allergies          Social History     Social History    Marital status: Single     Spouse name: N/A    Number of children: N/A    Years of education: N/A     Occupational History    Not on file.     Social History Main Topics    Smoking status: Never Smoker    Smokeless tobacco: Never Used    Alcohol use Yes      Comment: occasionally     Drug use: No    Sexual activity: Yes     Partners: Female     Birth control/ protection: None     Other Topics Concern    Not on file     Social History Narrative    Disabled. The patient is the youngest of 6 siblings. Single. Lives with single-sex partner.                            ROS:  General ROS: negative  Respiratory ROS: no cough, shortness of breath, or wheezing  Cardiovascular ROS: no chest pain or dyspnea on exertion  Musculoskeletal ROS: " negative  Neurological ROS:   negative for - gait disturbance, numbness/tingling, seizures or tremors  Dermatological ROS: negative          LAST HbA1c:   Hemoglobin A1C   Date Value Ref Range Status   12/18/2017 7.4 (H) 4.0 - 5.6 % Final     Comment:     According to ADA guidelines, hemoglobin A1c <7.0% represents  optimal control in non-pregnant diabetic patients. Different  metrics may apply to specific patient populations.   Standards of Medical Care in Diabetes-2016.  For the purpose of screening for the presence of diabetes:  <5.7%     Consistent with the absence of diabetes  5.7-6.4%  Consistent with increasing risk for diabetes   (prediabetes)  >or=6.5%  Consistent with diabetes  Currently, no consensus exists for use of hemoglobin A1c  for diagnosis of diabetes for children.  This Hemoglobin A1c assay has significant interference with fetal   hemoglobin   (HbF). The results are invalid for patients with abnormal amounts of   HbF,   including those with known Hereditary Persistence   of Fetal Hemoglobin. Heterozygous hemoglobin variants (HbAS, HbAC,   HbAD, HbAE, HbA2) do not significantly interfere with this assay;   however, presence of multiple variants in a sample may impact the %   interference.     08/07/2017 9.3 (H) 4.0 - 5.6 % Final     Comment:     According to ADA guidelines, hemoglobin A1c <7.0% represents  optimal control in non-pregnant diabetic patients. Different  metrics may apply to specific patient populations.   Standards of Medical Care in Diabetes-2016.  For the purpose of screening for the presence of diabetes:  <5.7%     Consistent with the absence of diabetes  5.7-6.4%  Consistent with increasing risk for diabetes   (prediabetes)  >or=6.5%  Consistent with diabetes  Currently, no consensus exists for use of hemoglobin A1c  for diagnosis of diabetes for children.  This Hemoglobin A1c assay has significant interference with fetal   hemoglobin   (HbF). The results are invalid for  "patients with abnormal amounts of   HbF,   including those with known Hereditary Persistence   of Fetal Hemoglobin. Heterozygous hemoglobin variants (HbAS, HbAC,   HbAD, HbAE, HbA2) do not significantly interfere with this assay;   however, presence of multiple variants in a sample may impact the %   interference.     05/09/2017 9.9 (H) 4.5 - 6.2 % Final     Comment:     According to ADA guidelines, hemoglobin A1C <7.0% represents  optimal control in non-pregnant diabetic patients.  Different  metrics may apply to specific populations.   Standards of Medical Care in Diabetes - 2016.  For the purpose of screening for the presence of diabetes:  <5.7%     Consistent with the absence of diabetes  5.7-6.4%  Consistent with increasing risk for diabetes   (prediabetes)  >or=6.5%  Consistent with diabetes  Currently no consensus exists for use of hemoglobin A1C  for diagnosis of diabetes for children.           EXAM:   Vitals:    01/10/18 0834   BP: (!) 156/85   Pulse: 81   Weight: (!) 152.4 kg (336 lb)   Height: 5' 5" (1.651 m)       General: Pt. is well-developed, well-nourished, appears stated age, in no acute distress, alert and oriented x 3.      Vascular: Dorsalis pedis and posterior tibial pulses are 2/4 bilaterally. 3 secs capillary refill time and toes are warm to touch.  There is reduced hair growth on the feet.  There is 1+ edema.  no varicosities.      Neurological:  Light touch, proprioception, and sharp/dull sensation are all intact bilaterally. Protective threshold with the [place] monofilament is diminished bilaterally.     Dermatological:  The skin is intact, warm.  The toenails  1-5 L and 1-5 R  are greenish- yellow, long by 5-6mm and thick by 2-3mm with subungual debris  .  There is presence of hyperkeratotic lesions, bilateral 1st metatarsal head.  There are no open wounds. There is no ecchymoses.  no erythema noted.   Skin diffusely dry on the soles     Interdigital spaces are intact, no " fissures    Skin atrophic, thin, dry and mildly hyperpigmented.     Musculoskeletal:  Muscle strength is 5/5 in all groups bilaterally.  Metatarsophalangeal, subtalar, and ankle range of motion are intact without crepitus.           Assessment / Plan:      Shoe inspection. Diabetic Foot Education. Patient reminded of the importance of good nutrition and blood sugar control to help prevent podiatric complications of diabetes. Patient instructed on proper foot hygiene. We discussed wearing proper shoe gear, daily foot inspections, never walking without protective shoe gear, never putting sharp instruments to feet, and routine diabetic foot exam and care every 3-6 months to prevent complications.      Type II diabetes mellitus with neurological manifestations    Corns and callus    Dermatophytosis of nail    Other orders  -     Foot Care         Routine Foot Care  Date/Time: 1/10/2018 8:52 AM  Performed by: JOSÉ ANTONIO WILLIS  Authorized by: JOSÉ ANTONIO WILLIS     Consent Done?:  Yes (Verbal)  Hyperkeratotic Skin Lesions?: Yes    Number of trimmed lesions:  2  Location(s):  Left 1st Metatarsal Head and Right 1st Metatarsal Head    Nail Care Type:  Debride  Location(s): All  (Left 1st Toe, Left 3rd Toe, Left 2nd Toe, Left 4th Toe, Left 5th Toe, Right 1st Toe, Right 2nd Toe, Right 3rd Toe, Right 4th Toe and Right 5th Toe)  Patient tolerance:  Patient tolerated the procedure well with no immediate complications     With patient's permission, the toenails mentioned above were aggressively reduced and debrided using a nail nipper, removing all offending nail and debris. Utilizing a #15 scalpel, I trimmed the corns and calluses at the above mentioned location.      The patient will continue to monitor the areas daily, inspect the feet, wear protective shoe gear when ambulatory, and moisturizer to maintain skin integrity.         This patient has documented high risk feet requiring routine maintenance secondary to diabetes       follow  up 3 months or sooner if concerned.

## 2018-01-10 NOTE — PATIENT INSTRUCTIONS
How to Check Your Feet    Below are tips to help you look for foot problems. Try to check your feet at the same time each day, such as when you get out of bed in the morning.    · Check the top of each foot. The tops of toes, back of the heel, and outer edge of the foot can get a lot of rubbing from poor-fitting shoes.    · Check the bottom of each foot. Daily wear and tear often leads to problems at pressure spots.    · Check the toes and nails. Fungal infections often occur between toes. Toenail problems can also be a sign of fungal infections or lead to breaks in the skin.    · Check your shoes, too. Loose objects inside a shoe can injure the foot. Use your hand to feel inside your shoes for things like alie, loose stitching, or rough areas that could irritate your skin.        Diabetic Foot Care    Diabetes can lead to a number of different foot complications. Fortunately, most of these complications can be prevented with a little extra foot care. If diabetes is not well controlled, the high blood sugar can cause damage to blood vessels and result in poor circulation to the foot. When the skin does not get enough blood flow, it becomes prone to pressure sores and ulcers, which heal slowly.  High blood sugar can also damage nerves, interfering with the ability to feel pain and pressure. When you cant feel your foot normally, it is easy to injure your skin, bones and joints without knowing it. For these reasons diabetes increases the risk of fungal infections, bunions and ulcers. Deep ulcers can lead to bone infection. Gangrene is the most serious foot complication of diabetes. It usually occurs on the tips of the toes as blacked areas of skin. The black area is dead tissue. In severe cases, gangrene spreads to involve the entire toe, other toes and the entire foot. Foot or toe amputation may be required. Good foot care and blood sugar control can prevent this.    Home Care  1. Wear comfortable, proper fitting  shoes.  2. Wash your feet daily with warm water and mild soap.  3. After drying, apply a moisturizing cream or lotion.  4. Check your feet daily for skin breaks, blisters, swelling, or redness. Look between your toes also.  5. Wear cotton socks and change them every day.  6. Trim toe nails carefully and do not cut your cuticles.  7. Strive to keep your blood sugar under control with a combination of medicines, diet and activity.  8. If you smoke and have diabetes, it is very important that you stop. Smoking reduces blood flow to your foot.  9. Avoid activities that increase your risk of foot injury:  · Do not walk barefoot.  · Do not use heating pads or hot water bottles on your feet.  · Do not put your foot in a hot tub without first checking the temperature with your hand.  10) Schedule yearly foot exams.    Follow Up  with your doctor or as advised by our staff. Report any cut, puncture, scrape, other injury, blister, ingrown toenail or ulcer on your foot.    Get Prompt Medical Attention  if any of the following occur:  -- Open ulcer with pus draining from the wound  -- Increasing foot or leg pain  -- New areas of redness or swelling or tender areas of the foot    © 1534-3798 [employer]. [address] [place] [address], [place] [address]. All rights reserved. This information is not intended as a substitute for professional medical care. Always follow your healthcare professional's instructions.

## 2018-01-10 NOTE — PROGRESS NOTES
Patient, Alivia Thomas (MRN #[id number]), presented with a recorded BMI of 55.91 kg/m^2 consistent with the definition of morbid obesity (ICD-10 E66.01). The patient's morbid obesity was monitored, evaluated, addressed and/or treated. This addendum to the medical record is made on 01/10/2018.

## 2018-01-30 ENCOUNTER — TELEPHONE (OUTPATIENT)
Dept: INTERNAL MEDICINE | Facility: CLINIC | Age: 53
End: 2018-01-30

## 2018-01-30 NOTE — TELEPHONE ENCOUNTER
----- Message from Dana Saldana sent at 1/30/2018 12:59 PM CST -----  Contact: self/[phone]  Patient called in regards needing to talk with Dr Richardson medical assistant pertaining to a rx need It (patient stated that is not a medication, is for something to aid her with). Patient stated that she would like to explain her request to the doctor or to the medical assistant. Please call and advise.       Thank you!!!

## 2018-02-02 ENCOUNTER — TELEPHONE (OUTPATIENT)
Dept: OBSTETRICS AND GYNECOLOGY | Facility: CLINIC | Age: 53
End: 2018-02-02

## 2018-02-02 NOTE — TELEPHONE ENCOUNTER
I spoke to the pt  And informed her that she will have a heavy cycle due to her not having one in three months, but if she bleeds a pad an hour she needs to come in to the ed

## 2018-02-02 NOTE — TELEPHONE ENCOUNTER
----- Message from Dana Whitmore sent at 2/2/2018 10:16 AM CST -----  Contact: [facility] CRUMDONTAEDELIA MCCONNELL [[id number]]  x_  1st Request  _  2nd Request  _  3rd Request        Who: REDDONTAE JAYESH [[id number]]    Why: Requesting a call back in regards to her cycle started yesterday and was told to notify the office. Please call her to advise.     What Number to Call Back: [phone]    When to Expect a call back: (Within 24 hours)    Please return the call at earliest convenience. Thanks!

## 2018-02-06 ENCOUNTER — OFFICE VISIT (OUTPATIENT)
Dept: PAIN MEDICINE | Facility: CLINIC | Age: 53
End: 2018-02-06
Payer: MEDICARE

## 2018-02-06 VITALS
WEIGHT: 293 LBS | SYSTOLIC BLOOD PRESSURE: 136 MMHG | DIASTOLIC BLOOD PRESSURE: 77 MMHG | HEIGHT: 65 IN | HEART RATE: 78 BPM | TEMPERATURE: 98 F | BODY MASS INDEX: 48.82 KG/M2

## 2018-02-06 DIAGNOSIS — G89.29 BILATERAL CHRONIC KNEE PAIN: ICD-10-CM

## 2018-02-06 DIAGNOSIS — M17.0 PRIMARY OSTEOARTHRITIS OF BOTH KNEES: ICD-10-CM

## 2018-02-06 DIAGNOSIS — Z79.891 ENCOUNTER FOR LONG-TERM OPIATE ANALGESIC USE: ICD-10-CM

## 2018-02-06 DIAGNOSIS — M19.90 OSTEOARTHRITIS, UNSPECIFIED OSTEOARTHRITIS TYPE, UNSPECIFIED SITE: ICD-10-CM

## 2018-02-06 DIAGNOSIS — M47.899 FACET SYNDROME: ICD-10-CM

## 2018-02-06 DIAGNOSIS — M47.816 FACET ARTHRITIS OF LUMBAR REGION: ICD-10-CM

## 2018-02-06 DIAGNOSIS — G89.4 CHRONIC PAIN DISORDER: ICD-10-CM

## 2018-02-06 DIAGNOSIS — Z96.651 STATUS POST TOTAL RIGHT KNEE REPLACEMENT: ICD-10-CM

## 2018-02-06 DIAGNOSIS — M79.10 MYALGIA: Primary | ICD-10-CM

## 2018-02-06 DIAGNOSIS — M51.36 DDD (DEGENERATIVE DISC DISEASE), LUMBAR: ICD-10-CM

## 2018-02-06 DIAGNOSIS — M47.816 SPONDYLOSIS OF LUMBAR REGION WITHOUT MYELOPATHY OR RADICULOPATHY: ICD-10-CM

## 2018-02-06 DIAGNOSIS — M25.562 BILATERAL CHRONIC KNEE PAIN: ICD-10-CM

## 2018-02-06 DIAGNOSIS — M25.561 BILATERAL CHRONIC KNEE PAIN: ICD-10-CM

## 2018-02-06 PROCEDURE — 99214 OFFICE O/P EST MOD 30 MIN: CPT | Mod: 25,S$PBB,, | Performed by: NURSE PRACTITIONER

## 2018-02-06 PROCEDURE — 99213 OFFICE O/P EST LOW 20 MIN: CPT | Mod: PBBFAC | Performed by: NURSE PRACTITIONER

## 2018-02-06 PROCEDURE — 99999 PR PBB SHADOW E&M-EST. PATIENT-LVL III: CPT | Mod: PBBFAC,,, | Performed by: NURSE PRACTITIONER

## 2018-02-06 PROCEDURE — 80307 DRUG TEST PRSMV CHEM ANLYZR: CPT

## 2018-02-06 RX ORDER — METHYLPREDNISOLONE ACETATE 40 MG/ML
40 INJECTION, SUSPENSION INTRA-ARTICULAR; INTRALESIONAL; INTRAMUSCULAR; SOFT TISSUE ONCE
Status: COMPLETED | OUTPATIENT
Start: 2018-02-06 | End: 2018-02-06

## 2018-02-06 RX ORDER — BUPIVACAINE HYDROCHLORIDE 2.5 MG/ML
9 INJECTION, SOLUTION EPIDURAL; INFILTRATION; INTRACAUDAL
Status: COMPLETED | OUTPATIENT
Start: 2018-02-06 | End: 2018-02-06

## 2018-02-06 RX ORDER — OXYCODONE AND ACETAMINOPHEN 10; 325 MG/1; MG/1
1 TABLET ORAL EVERY 6 HOURS PRN
Qty: 120 TABLET | Refills: 0 | Status: SHIPPED | OUTPATIENT
Start: 2018-02-06 | End: 2018-03-06 | Stop reason: SDUPTHER

## 2018-02-06 RX ADMIN — BUPIVACAINE HYDROCHLORIDE 22.5 MG: 2.5 INJECTION, SOLUTION EPIDURAL; INFILTRATION; INTRACAUDAL at 10:02

## 2018-02-06 RX ADMIN — METHYLPREDNISOLONE ACETATE 40 MG: 40 INJECTION, SUSPENSION INTRA-ARTICULAR; INTRALESIONAL; INTRAMUSCULAR; SOFT TISSUE at 10:02

## 2018-02-06 NOTE — PROGRESS NOTES
Subjective:      Patient ID: Alivia Thomas is a 52 y.o. female.    Chief Complaint: Follow-up (after RFA)    Referred by: No ref. provider found     Interval History 2/6/2018:  The patient returns to clinic today for follow up. She is s/p left L2,3,4,5 RFA on 12/26/2017. She is s/p right L2,3,4,5 RFA on 1/9/2018. She reports limited relief of her back pain at this time. She does report muscle spasms today. She continues to report bilateral knee pain that is aching and constant. She reports that she has recently gained weight. She reports that she has been taking care of her ill father and has stopped following her diet. She continues to take Percocet with benefit and without side effects. She denies any other health changes. She denies any bowel or bladder incontinence. Her pain today is 9/10.    Interval History 11/20/2017:  The patient returns to clinic today for follow up. She continues to report bilateral knee pain. She reports increased low back pain. She describes this pain as aching and constant. She denies any radiating leg pain. She reports that the recent cold weather change has increased her pain. She continues to take Percocet as needed for pain with benefit. She denies any adverse effects. She denies any bowel or bladder incontinence. She reports that she was recently diagnosed with an ear infection and is currently on antibiotics. Her pain today is 8/10.    Interval History 8/25/2017:  The patient returns to clinic today for follow up. She continues to report bilateral knee pain. She continues to take care of her elderly ill father. She also reports that her brother has been ill and hospitalized. She continues to take Percocet as needed for pain with benefit. She denies any adverse effects. She continues to exercise and diet. Her pain today is 7/10.    Interval History 5/25/17:   The patient returns today for follow up. She is s/p left L2,3,4,5 cooled RFA on 4/26/17 and right L2,3,4,5 cooled RFA on  5/9/17. She reports 80% relief of her back pain. She continues to report bilateral knee pain that is worse with prolonged walking. She reports that she is exercising 5 days a week. She continues to take care of her elderly father. She continues to take Percocet as needed for pain with relief. She denies any other health changes. She denies any bowel or bladder incontinence. Her pain today is 4/10.     Interval History 4/11/2017:  The patient returns today for follow up and medication refill.  She has increased her dieting and exercise for weight loss.  She has lost 14 lbs since her last visit.  She is very excited about this.  She is walking 6 days per week.  She also stays active taking her of her elderly father.  She has given up meat for lent.  She continues to follow up with bariatrics.  Her biggest complaint today is lower back pain.  She previously had benefit with cooled lumbar RFAs and would like to schedule repeats.  Her pain is worse with prolonged standing and bending.  She is taking Percocet as needed for pain without adverse effects.  Her pain today is 6/10.  The patient denies any bowel or bladder incontinence or signs of saddle paresthesia.  The patient denies any major medical changes since last office visit.    Interval History 3/14/2017:  The patient returns today for follow up of back and knee pain.  She continues with measures for weight loss.  She is still planning on bariatric surgery but would like to lose weight on her own prior to this.  She has lost about 4 lbs since her visit with me last month.  She continues to take Percocet which helps her pain without adverse effects.  Her pain today is 8/10.  The patient denies any bowel or bladder incontinence or signs of saddle paresthesia.  The patient denies any major medical changes since last office visit.    Interval History 2/15/2017:  The patient returns today for follow up and medication refill.  She is still planning on having weight loss  surgery in the future.  She admits that she has not been as active as previously.  She has a follow up with Dr. Swan scheduled next month.  She continues to take Percocet with benefit.  Her pain today is 8/10.      Interval History 1/18/2017:  The patient returns for follow up and medication refill.  She reports no major changes in her back and knee pain since her last visit.  She has had a lot going on with health issues of family members.  She takes care of her father and her brother, who were both recently hospitalized.  She still plans on having weight loss surgery in the future.  Her pain today is.  She is taking Percocet with benefit and without side effects at this time.    Interval History 12/15/2016:  The patient returns today for follow up of lower back and bilateral knee pain.  She continues to report relief from cooled lumbar RFAs in October.  She feels as though the colder weather is causing increased knee pain.  Since her last visit, she has decided to have weight loss surgery.  She was evaluated by bariatrics and is undergoing pre-op workup at this time.  Her pain today is 8/10.  She continue to take Percocet with significant benefit.      Interval History 11/3/2016:  The patient returns today for follow up of back pain.  She is s/p left then right L2,3,4,5 cooled RFA completed on 10/19/16 with 80% pain relief.  She is very satisfied with these results.  She continues to take Percocet with relief.  She has started kick boxing classes and continues to lose weight.  She has noticeable weight loss since her last visit and is very happy about this.  Her pain today is 8/10.    Interval History 9/16/2016:  The patient returns today with complaints of lower back and knee pain.  Her worst pain is in her lower back without radiation.  She has lost weight since her last weight.  She has increased her exercise which is helping.  She continues with her diet plan.  She is having the pool at her home fixed and  plans to use this for exercise, as she has benefited from frequent pool therapy at [facility].  She previously had significant relief with lumbar RFAs in April for about 4 months.  She would like to repeat the procedures.  She continues to take Percocet as needed which provides her relief.  Her pain today is 8/10.  The patient denies any bowel or bladder incontinence or signs of saddle paresthesia.      Interval History 8/19/2016:  The patient returns today for follow up and medication refill.  She complains of back and knee pain.  Her back pain does not radiate.  She did have relief with RFA in April but feels the pain is returning.  Her previous UTI has resolved.  She has been unable to return to her aquatic therapy because she is caring for her father.  She plans to start walking in the morning before her father wakes up.  She has gained a few pounds since her last visit and is upset about this, as she was previously losing at each visit.  She is also trying to control her diet.  She continues to take Percocet with significant pain relief.  Her pain today is 8/10.  The patient denies any bowel or bladder incontinence or signs of saddle paresthesia.  The patient denies any major medical changes since last office visit.    Interval History 7/19/2016:  The patient returns today for follow up.  She has a history of lower back and bilateral knee pain.  Since her last visit, she reports being diagnosed with a UTI and is currently on antibiotics. She has still been unable to return to aquatherapy.  She has been performing a home exercise routine.  She has lost 6 lbs since her visit last month.  She is taking Percocet as needed for pain.  She reports efficacy without adverse effects.  Her pain today is 7/10.      Interval History 6/20/2016:  Patient returns for follow up and medication refill.  She has a history of lower back and bilateral knee pain.  Since her last encounter, she reports that she has been suffering  with an upper respiratory infection.  She saw Dr. Richardson last week and was started on oral Augmentin and antibiotic eye drops.  She reports that whenever she is sick, she suffers with swelling and drainage to her left eye.  She states that her symptoms have improved since starting the eye drops.  She has been unable to participate in her daily pool therapy since she has been sick but is anxious to resume once she is feeling better.  She did have recent labwork which showed an improving A1C with cholesterol and triglycerides WNL.  She is proud of herself about this, as she reports be very good with her diet recently.  Her pain today is an 8/10.    Interval History 5/5/2016:  Patient returns today for procedure follow up.  She is s/p left then right L2,3,4,5 RFA completed on 4/20/16 with 70% pain relief so far.  She reports lower back and bilateral knee pain.  She has had genicular nerve blocks in the past which provided significant relief for her left knee pain and limited relief of her right knee pain.  She is currently doing physical therapy per self.  She is doing 45 minutes of pool therapy five days per week.  She thinks that this is helping with her pain and mobility.  She is trying to lose weight because she is aware that this will help with her pain.  She is taking percocet as needed which provided relief without side effects.  Her pain today is a 5/10.      Interval History: 3/28/2016:  Patient returns today for follow up of lower back and bilateral knee pain.  She is s/p Bilateral L2,3,4,5 MBB on 2/16/16 with 80% relief for 5 days and bilateral L2,3,4,5 MBB on 3/1/16 with 70% pain relief for 1 day.  She is requesting to schedule the RFAs.  The worst of her pain is located to her left lower back and does not radiate. She is still complaining of bilateral knee pain which is worse with walking and activity.  Her pain today is a 9/10.  The patient denies any bowel/bladder incontinence or symptoms of saddle  paresthesia.  The patient denies any major medical changes since last OV. She is currently taking Percocet which helps her pain without any adverse effects.     Interval History: 12/17/2015:  Patient presents in clinic for follow up for lower back, bilateral knee, and right arm pain. Her pain is 9/10 today. She underwent carpal tunnel revision 12/14/15 in the right wrist. She is currently out of her Percocet 10-325mg prescription.   Cont to have significant low back pain, wh will also ich she reports is her worst current pain.  Based on previous imaging we know that the patient has significant facet arthropathy in the lumbar spine at the levels of L3-4 and L4-5 L5-S1.  She describes dull achy with occasional sharp pain rates as 7/10.     Interval history 10/26/2015:  Patient returns to clinic for follow up previously seen for knee pain and low back pain. She reports pain is improved with Percocet 10/325 BID. She is no longer taking Lyrica and recently started Topamax. She continues to take Celebrex once per day and does help. She also continues to use a topical cream PRN and does help some. She has completed therapy since last visit but she is continuing to exercise regularly. Her pain in back and knees is unchanged in quality and distribution from previous visits. She otherwise denies any new issues at this time.     Interval history 9/21/2015:  Since previous encounter the patient comes in after having started using gabapentin and developing swelling in her legs.  She states that it did make a difference for her pain although she discontinued it secondary to swelling after a decrease in her dosing did not alleviate this.  She followed up with her orthopedist and did have x-rays performed which did not show any significant change compared to previous.  She is scheduled for a four-month follow-up.  She has not yet begun exercising but will begin soon she is scheduled for pool-based therapy.  The opioid medications  have been helping her and her pain twice a day.  Further decrease to once a day prevented her from being able to function.  She does continue to take Celebrex without adverse reaction.  Additionally the patient stated that she has been having lower back pain which is new.    Interval History 08-: Since previous visit patient comes in today to discuss her medications.  Patient states she is having pain in the lower back and both knee, sharp , throbbing , burning, and stabbing pain, she rates it 8/10.  Patient is taking percocet 10/325mg, we have been weaning her from this medication last prescription was provided for 30 tablets.  Additionally the patient is taking Celebrex with regularity with some improvement in her pain.  She has completed physical therapy status post knee replacement her knee hardware appears appropriate she has a scheduled appointment to follow-up with her orthopedic surgeon in one week to discuss using a extension brace while she is at home laying in bed.  She continues to swim twice a week and is actively trying to lose weight and has been dieting.    Interval History 06/19/2015:  Patient presents in clinic for two month follow up. She reports her bilateral knee pain and low back pain is an 8/10. She currently takes celebrex and Percocet for pain and uses a topical cream.  She was recently evaluated by her orthopedist and the hardware all appears to be appropriately placed and the next follow-up is in 6 weeks.  The patient continues to work on weight loss and exercise and states that she has been doing more than in the past.   Patient reports no other health changes since previous encounter.    Interval History 04/09/2015:  Patient presents in clinic for one month follow up. She reports bilateral knee pain and low back pain is a 9/10 today. She currently takes percocet for pain as needed and uses a cane for ambulation. She states that the low back pain is new.  She continues to have  bilateral knee pain and is in physical therapy.  She continues to take Celebrex daily and uses a topical pain cream regularly.    Patient reports no other health changes since previous encounter.    Interval history 3/5/2015:  Since previous encounter patient is status post right total knee replacement on 11/4/2014 and has been healed with postoperative visits showing good progress.  The patient does have continued physical therapy sessions and has been attempting to lose weight and has lost approximately 25 pounds although her BMI continues to be 54.  She has been making good efforts to try and continue to increase her range of motion and lose weight.  She continues to have significant pain in bilateral knees and continues to use a cane for ambulation.  She was receiving oxycodone/acetaminophen 10/325 every 8 hours by mouth when necessary and requiring in order to persist in her physical therapy although the topical pain cream that she has been applying has been helping her to a limited degree she still requires the medication regularly.  Her recent x-ray imaging shows good positioning of the prosthesis.  No other health changes since previous encounter.     Interval history 2/17/2014:  Patient reports that she has been using the topical compounded cream on the knee approximately 4 times per day and she states that it does help her with her pain that she is experiencing.  She states that she has not gone to formal physical therapy but that she has been going to a pool that has exercise classes which is free for her and that she feels like it is helping her continue to lose weight.  Additionally she continues using hydrocodone/acetaminophen 7.5/750 approximately 3 times per day when necessary and states that also helps with her pain symptoms.  He she's still talks to have replacement for the left knee, but would like to lose weight further before going to that.  She has also had previous injections into her knees  which have offered little to no relief in her pain symptoms.  She has had no other health changes since previous encounter.    Previous encounter 1/21/2014:  HPI Comments: 49 yo female presents for initial evaluation of bilateral knee pain, L>R. She is s/p arthroscopic right knee surgery and eventual replacement and then revision surgeries (Dr. Swan, Orthopedics). The pain is present in both knees and is described as a terrible ache. She hears occasional popping in both knees with movement. The pain is worse with being on her feet and getting up from a sitting to standing position. Denies lower ext weakness or paresthesias. She does not have back pain or pain radiating down her legs. The pain is better with Celebrex and rest. She also takes Vicodin ES TID but this makes her very sleepy. She is not sure it helps with the pain because she usually falls asleep.     Physical Therapy: not since 2011 just prior to her 3rd right knee surgery (revision after replacement); has tried swimming which helps with weight loss    Non-pharmacologic Treatment: none    Pain Medications: Percocet and celebrex    Blood thinners: ASA 81 mg daily     Interventional Therapies:   steroid inj and visco-supplementation (series of 3) in left knee- not helpful  3/31/14 Bilateral genicular nerve block- significant relief of left knee, limited relief of right knee pain  2/16/16 Bilateral L2,3,4,5 MBB  3/1/16 Bilateral L2,3,4,5 MBB   4/6/16 Left L2,3,4,5 RFA- significant relief  4/20/16 Right L2,3,4,5 RFA- significant relief  10/5/16 Left L2,3,4,5 cooled RFA  10/19/16 Right L2,3,4,5 cooled RFA  4/26/17 Left L2,3,4,5 cooled RFA  5/9/17 Right L2,3,4,5 cooled RFA  12/26/2017- Left L2,3,4,5 cooled RFA  1/9/2018- Right L2,3,4,5 cooled RFA      Relevant Surgeries: right knee surgery x3 (arthroscopic, then replacement and subsequent revision surgery), s/p left total knee arthroplasty    Relevant Imaging:  Xray Lumbar spine 09/21/2015  Lumbar spine  radiograph    Comparison: None    Results: AP, lateral neutral, lateral flexion , lateral extension, bilateral oblique and spot views. The alignment of the lumbar spine demonstrates a mild levoscoliosis . 11-mm anterior listhesis of L4 relative to L5 with no translational abnormalities  seen on flexion and extension views. The vertebral body heights are well-maintained , mild disk space narrowing L4-L5 and L5-S1. Mild anterior and marginal osteophyte formation seen throughout the lumbar spine . The oblique views demonstrate no   definite spondylolysis. There is facet joint osseous hypertrophy noted at L3-L4 and L4-L5.      Impression         The Significant spondylosis of the lumbar spine with grade 1 anterior listhesis L4 relative to L5.  Facet joint osseous hypertrophy L3-L4 and L4-5.      Electronically signed by: BLESSING ROE MD  Date: 09/21/15  Time: 09:56          X-ray knee bilateral 8/26/2015:  Standing AP knees, lateral view of both knees and sunrise view of both patella. Study compared to May 2015. Postop change of bilateral knee replacement. The prosthetic components are in satisfactory position. Erosive changes involving the patella   again evident bilaterally.    Impression no significant change.      Xray Bilateral Knee 05/25/2015:  There are bilateral total knee arthroplasties with posterior resurfacing of the patella. As observed on 12/17/2014 there is a fracture of the left patella in the parasagittal plane.    Heterotopic bone is evident about each knee.    I detect no dislocation, unusual radiopaque retained foreign body, lytic or blastic lesion, or chondrocalcinosis.    Lab Results   Component Value Date    HGBA1C 7.4 (H) 12/18/2017     Lab Results   Component Value Date    CHOL 191 05/09/2017    CHOL 200 (H) 12/15/2016    CHOL 153 06/17/2016     Lab Results   Component Value Date    HDL 48 05/09/2017    HDL 40 12/15/2016    HDL 44 06/17/2016     Lab Results   Component Value Date     LDLCALC 129.0 05/09/2017    LDLCALC 141.0 12/15/2016    LDLCALC 92.6 06/17/2016     Lab Results   Component Value Date    TRIG 70 05/09/2017    TRIG 95 12/15/2016    TRIG 82 06/17/2016     Lab Results   Component Value Date    CHOLHDL 25.1 05/09/2017    CHOLHDL 20.0 12/15/2016    CHOLHDL 28.8 06/17/2016         Past Medical History:   Diagnosis Date    Asthma     Diabetes mellitus type II     DJD (degenerative joint disease) of knee 6/19/2014    Hyperlipidemia     Morbid obesity     Sleep apnea      Past Surgical History:   Procedure Laterality Date    CARPAL TUNNEL RELEASE      CARPAL TUNNEL RELEASE  1980s    left    CARPAL TUNNEL RELEASE  2012    right    JOINT REPLACEMENT Bilateral     with 2 revisions on rt    KNEE SURGERY  3/2010    orthroscope    KNEE SURGERY  6-19-14    left TKR     Family History   Problem Relation Age of Onset    Diabetes Mother     Hypertension Mother     Cataracts Mother     Diabetes Father     Cataracts Father     Coronary artery disease Brother     Amblyopia Neg Hx     Blindness Neg Hx     Cancer Neg Hx     Glaucoma Neg Hx     Macular degeneration Neg Hx     Retinal detachment Neg Hx     Strabismus Neg Hx     Stroke Neg Hx     Thyroid disease Neg Hx      Social History     Social History    Marital status: Single     Spouse name: N/A    Number of children: N/A    Years of education: N/A     Occupational History    Not on file.     Social History Main Topics    Smoking status: Never Smoker    Smokeless tobacco: Never Used    Alcohol use Yes      Comment: occasionally     Drug use: No    Sexual activity: Yes     Partners: Female     Birth control/ protection: None     Other Topics Concern    Not on file     Social History Narrative    Disabled. The patient is the youngest of 6 siblings. Single. Lives with single-sex partner.                      Review of patient's allergies indicates:  No Known Allergies    Medication List with Changes/Refills    Current Medications    ALBUTEROL 90 MCG/ACTUATION INHALER    Take 2 puffs every 4-6 hours  as needed for shortness of breath/wheezing    ASPIRIN (ECOTRIN) 81 MG EC TABLET    Take 1 tablet by mouth every morning. prevents heart attacks and strokes    ATORVASTATIN (LIPITOR) 20 MG TABLET    Take 1 tablet (20 mg total) by mouth once daily.    ATORVASTATIN (LIPITOR) 20 MG TABLET    TAKE 1 TABLET (20 MG TOTAL) BY MOUTH ONCE DAILY.    BLOOD SUGAR DIAGNOSTIC STRP    Freestyle light strips and lancets    BLOOD SUGAR DIAGNOSTIC STRP    Check glucose four times daily Strips and lancets covered by insurance E11.9 - One touch    BLOOD-GLUCOSE METER (FREESTYLE SYSTEM KIT) KIT    Use as instructed    BLOOD-GLUCOSE METER KIT    Check glucose four times daily E11.9 meter covered by insurance One Touch    CELECOXIB (CELEBREX) 200 MG CAPSULE    Take 1 capsule (200 mg total) by mouth once daily.    DICLOFENAC SODIUM (VOLTAREN) 1 % GEL    Apply 2 g topically once daily.    ECONAZOLE NITRATE 1 % CREAM    Apply topically 2 (two) times daily.    FLUCONAZOLE (DIFLUCAN) 150 MG TAB        FLUOXETINE (PROZAC) 20 MG CAPSULE    Take 1 capsule (20 mg total) by mouth once daily.    FLUTICASONE (FLONASE) 50 MCG/ACTUATION NASAL SPRAY    1 spray by Each Nare route 2 (two) times daily as needed for Rhinitis.    INSULIN DETEMIR (LEVEMIR FLEXPEN) 100 UNIT/ML (3 ML) SUBQ INPN PEN    Inject 30 Units into the skin every evening.    INSULIN LISPRO (HUMALOG KWIKPEN) 100 UNIT/ML INPN PEN    11 Units before meals plus sliding scale    INSULIN LISPRO (HUMALOG) 100 UNIT/ML INJECTION    Inject 11 Units into the skin 3 (three) times daily before meals. Glucose        Intervention  (units to add in addition to meal time Humalog base dose of 11 Units)                        0-80    orange juice             0 units     151 - 200       +2 Units     201 - 250       +4 Units     251 - 300       +6 Units     301 - 350        +8 Units     351 - 400       +10  "Units     > 400           Call MD    INSULIN LISPRO (HUMALOG) 100 UNIT/ML INJECTION    11 Units before meals plus sliding scale    INSULIN LISPRO (HUMALOG) 100 UNIT/ML INJECTION    INJECT 11 UNITS SUBCUTANEOUSLY THREE TIMES DAILY BEFORE MEAL(S) PLUS  SLIDING  SCALE    LISINOPRIL (PRINIVIL,ZESTRIL) 2.5 MG TABLET    One daily for proteinuria.    LISINOPRIL (PRINIVIL,ZESTRIL) 2.5 MG TABLET    ONE DAILY FOR PROTEINURIA.    METFORMIN (GLUCOPHAGE-XR) 500 MG 24 HR TABLET    Take 2 tablets (1,000 mg total) by mouth 2 (two) times daily.    OMEPRAZOLE (PRILOSEC) 20 MG CAPSULE    Take 1 capsule (20 mg total) by mouth every morning.    OMEPRAZOLE (PRILOSEC) 20 MG CAPSULE    TAKE 1 CAPSULE (20 MG TOTAL) BY MOUTH EVERY MORNING.    OXYCODONE-ACETAMINOPHEN (PERCOCET)  MG PER TABLET    Take 1 tablet by mouth every 6 (six) hours as needed for Pain.    PEN NEEDLE, DIABETIC 33 GAUGE X 5/32" NDLE    1 application by Misc.(Non-Drug; Combo Route) route 4 (four) times daily with meals and nightly.    VITAMIN D 185 MG TAB    Take 5,000 mg by mouth once daily.         REVIEW OF SYSTEMS:    GENERAL:  Patient is actively losing weight.  RESPIRATORY:  Negative for cough, wheezing or shortness of breath, patient denies any recent URI.  CARDIOVASCULAR:  Negative for chest pain, leg swelling or palpitations.  GI:  Negative for abdominal discomfort, blood in stools or black stools or change in bowel habits, occasional constipation.  MUSCULOSKELETAL:  See HPI.  SKIN:  Negative for lesions, rash, and itching.  PSYCH:  No mood disorder or recent psychosocial stressors.  Patient's sleep is disturbed secondary to pain (patient reports also that she has insomnia).  HEMATOLOGY/LYMPHOLOGY:  Negative for prolonged bleeding, bruising easily or swollen nodes.  81 mg aspirin  ENDO: Patient has a history of diabetes  NEURO:   No history of headaches, syncope, paralysis, seizures or tremors.  All other reviewed and negative other than " "HPI.    OBJECTIVE:    /77   Pulse 78   Temp 97.8 °F (36.6 °C)   Ht 5' 5" (1.651 m)   Wt (!) 168 kg (370 lb 6 oz)   BMI 61.63 kg/m²     PHYSICAL EXAMINATION:    GENERAL: Well appearing, in no acute distress, alert and oriented x3.   PSYCH:  Mood and affect appropriate.  SKIN: Skin color, texture, turgor normal, no rashes or lesions.  HEAD/FACE:  Normocephalic, atraumatic.   CV: RRR with palpation of the radial artery.  PULM: No evidence of respiratory difficulty, symmetric chest rise.  BACK: There is pain to palpation over the lumbar facet joints. There is pain to palpation over lumbar paraspinals. Limited ROM with pain on flexion and extension.  Positive bilateral facet loading. Negative SLR bilaterally.  Pain with palpation to bilateral SI joints.  JENNA is negative bilaterally.  EXTREMITIES:  Well-healed midline scars to bilateral knees.  Painful extension and flexion of bilateral knees. Medial joint line tenderness to bilateral knees, left greater than right.     MUSCULOSKELETAL: Bilateral upper and lower extremity strength is normal and symmetric.  No atrophy or tone abnormalities are noted.  NEURO: Bilateral lower extremity coordination and muscle stretch reflexes are physiologic and symmetric.  Plantar response are downgoing. No clonus.  No loss of sensation is noted.  GAIT: Antalgic- ambulating without assistance.       Assessment:       Encounter Diagnoses   Name Primary?    Myalgia Yes    Spondylosis of lumbar region without myelopathy or radiculopathy     Facet arthritis of lumbar region     Primary osteoarthritis of both knees     DDD (degenerative disc disease), lumbar          Plan:       - Previous imaging was reviewed and discussed with the patient today. Previous labs reviewed today.     - Trigger point injections as per below.     - Continue to f/u with bariatrics for possible gastric sleeve surgery.      - The patient will continue a home exercise routine to help with pain and " strengthening. I encouraged her to follow her diet and increase her activity.     - Continue oxycodone-acetaminophen 10/325 one tablet every 6 hours PRN pain, #120, 0 refills.     - The patient is here today for a refill of current pain medications and they believe these provide effective pain control and improvements in quality of life.  The patient notes no serious side effects, and feels the benefits outweigh the risks.  The patient was reminded of the pain contract that they signed previously as well as the risks and benefits of the medication including possible death.  The updated [place] Board  Pharmacy prescription monitoring program was reviewed, and the patient has been found to be compliant with current treatment plan. Medication management provided by Dr. Wagner.     - UDS from 8/25/2017 reviewed and consistent. UDS done today.     - Continue topical pain cream PRN.    - RTC in 1 month or sooner if needed.     - Dr. Wagner was consulted on the patient and agrees with this plan.    The above plan and management options were discussed at length with patient. Patient is in agreement with the above and verbalized understanding.     Adeola Robledo NP  02/06/2018     Trigger Point Injection:   The procedure was discussed with the patient including complications of nerve damage,  bleeding, infection, and failure of pain relief.   Trigger points were identified by palpation and marked. Alcohol prep of sites done. A mixture of 9mL 0.25% bupivacaine +40mg Depo-Medrol was prepared (10 mL total).   A 27-gauge needle was advanced to the point of maximal tenderness, and  1 mL  was injected after negative aspiration. All sites done in the same manner. Patient tolerated the procedure well and without complications. Sites injected included: lumbar paraspinal x2 on the left, lumbar paraspinal x2 on the right, gluteus x1 on the left, gluteus x1 on the right.

## 2018-02-09 ENCOUNTER — OFFICE VISIT (OUTPATIENT)
Dept: OPTOMETRY | Facility: CLINIC | Age: 53
End: 2018-02-09
Payer: MEDICARE

## 2018-02-09 DIAGNOSIS — H52.203 MYOPIA WITH ASTIGMATISM AND PRESBYOPIA, BILATERAL: ICD-10-CM

## 2018-02-09 DIAGNOSIS — H52.4 MYOPIA WITH ASTIGMATISM AND PRESBYOPIA, BILATERAL: ICD-10-CM

## 2018-02-09 DIAGNOSIS — H52.13 MYOPIA WITH ASTIGMATISM AND PRESBYOPIA, BILATERAL: ICD-10-CM

## 2018-02-09 DIAGNOSIS — E11.9 TYPE 2 DIABETES MELLITUS WITHOUT RETINOPATHY: Primary | ICD-10-CM

## 2018-02-09 DIAGNOSIS — H25.13 NUCLEAR SCLEROSIS, BILATERAL: ICD-10-CM

## 2018-02-09 DIAGNOSIS — H43.812 POSTERIOR VITREOUS DETACHMENT OF LEFT EYE: ICD-10-CM

## 2018-02-09 PROCEDURE — 99212 OFFICE O/P EST SF 10 MIN: CPT | Mod: PBBFAC,PO | Performed by: OPTOMETRIST

## 2018-02-09 PROCEDURE — 92014 COMPRE OPH EXAM EST PT 1/>: CPT | Mod: S$PBB,,, | Performed by: OPTOMETRIST

## 2018-02-09 PROCEDURE — 92015 DETERMINE REFRACTIVE STATE: CPT | Mod: ,,, | Performed by: OPTOMETRIST

## 2018-02-09 PROCEDURE — 99999 PR PBB SHADOW E&M-EST. PATIENT-LVL II: CPT | Mod: PBBFAC,,, | Performed by: OPTOMETRIST

## 2018-02-09 NOTE — PROGRESS NOTES
HPI     Diabetic eye exam  No change in floaters left eye  Hemoglobin A1C       Date                     Value               Ref Range             Status                12/18/2017               7.4 (H)             4.0 - 5.6 %           Final              Comment:    According to ADA guidelines, hemoglobin A1c <7.0% represents  optimal   control in non-pregnant diabetic patients. Different  metrics may apply to   specific patient populations.   Standards of Medical Care in   Diabetes-2016.  For the purpose of screening for the presence of   diabetes:  <5.7%     Consistent with the absence of diabetes  5.7-6.4%    Consistent with increasing risk for diabetes   (prediabetes)  >or=6.5%    Consistent with diabetes  Currently, no consensus exists for use of   hemoglobin A1c  for diagnosis of diabetes for children.  This Hemoglobin   A1c assay has significant interference with fetal   hemoglobin   (HbF).   The results are invalid for patients with abnormal amounts of   HbF,     including those with known Hereditary Persistence   of Fetal Hemoglobin.   Heterozygous hemoglobin variants (HbAS, HbAC,   HbAD, HbAE, HbA2) do not   significantly interfere with this assay;   however, presence of multiple   variants in a sample may impact the %   interference.         08/07/2017               9.3 (H)             4.0 - 5.6 %           Final              Comment:    According to ADA guidelines, hemoglobin A1c <7.0% represents  optimal   control in non-pregnant diabetic patients. Different  metrics may apply to   specific patient populations.   Standards of Medical Care in   Diabetes-2016.  For the purpose of screening for the presence of   diabetes:  <5.7%     Consistent with the absence of diabetes  5.7-6.4%    Consistent with increasing risk for diabetes   (prediabetes)  >or=6.5%    Consistent with diabetes  Currently, no consensus exists for use of   hemoglobin A1c  for diagnosis of diabetes for children.  This Hemoglobin   A1c  assay has significant interference with fetal   hemoglobin   (HbF).   The results are invalid for patients with abnormal amounts of   HbF,     including those with known Hereditary Persistence   of Fetal Hemoglobin.   Heterozygous hemoglobin variants (HbAS, HbAC,   HbAD, HbAE, HbA2) do not   significantly interfere with this assay;   however, presence of multiple   variants in a sample may impact the %   interference.         05/09/2017               9.9 (H)             4.5 - 6.2 %           Final              Comment:    According to ADA guidelines, hemoglobin A1C <7.0% represents  optimal   control in non-pregnant diabetic patients.  Different  metrics may apply   to specific populations.   Standards of Medical Care in Diabetes -   2016.  For the purpose of screening for the presence of diabetes:  <5.7%       Consistent with the absence of diabetes  5.7-6.4%  Consistent with   increasing risk for diabetes   (prediabetes)  >or=6.5%  Consistent with   diabetes  Currently no consensus exists for use of hemoglobin A1C  for   diagnosis of diabetes for children.    ----------    Last edited by Justin Dodson, OD on 2/9/2018  8:45 AM. (History)            Assessment /Plan     For exam results, see Encounter Report.    Type 2 diabetes mellitus without retinopathy - Both Eyes    Nuclear sclerosis, bilateral    Posterior vitreous detachment of left eye    Myopia with astigmatism and presbyopia, bilateral      1. No diabetic retinopathy, no csme. Return in 1 year for dilated eye exam.  2. Educated pt on presence of cataracts and effects on vision. No surgery at this time. Recheck in one year.  3. Monitor condition. Patient to report any changes. RTC 1 year recheck.       4. Spec Rx given. Different lens options discussed with patient. RTC 1 year full exam.

## 2018-02-12 ENCOUNTER — OFFICE VISIT (OUTPATIENT)
Dept: URGENT CARE | Facility: CLINIC | Age: 53
End: 2018-02-12
Payer: MEDICARE

## 2018-02-12 VITALS
RESPIRATION RATE: 18 BRPM | BODY MASS INDEX: 48.82 KG/M2 | SYSTOLIC BLOOD PRESSURE: 160 MMHG | DIASTOLIC BLOOD PRESSURE: 97 MMHG | HEIGHT: 65 IN | OXYGEN SATURATION: 98 % | WEIGHT: 293 LBS | TEMPERATURE: 99 F | HEART RATE: 88 BPM

## 2018-02-12 DIAGNOSIS — T78.1XXA ALLERGIC REACTION TO FOOD, INITIAL ENCOUNTER: Primary | ICD-10-CM

## 2018-02-12 PROCEDURE — 99213 OFFICE O/P EST LOW 20 MIN: CPT | Mod: 25,S$GLB,, | Performed by: NURSE PRACTITIONER

## 2018-02-12 PROCEDURE — 96372 THER/PROPH/DIAG INJ SC/IM: CPT | Mod: S$GLB,,, | Performed by: EMERGENCY MEDICINE

## 2018-02-12 RX ORDER — PREDNISONE 20 MG/1
20 TABLET ORAL DAILY
Qty: 4 TABLET | Refills: 0 | Status: SHIPPED | OUTPATIENT
Start: 2018-02-12 | End: 2018-02-16

## 2018-02-12 RX ORDER — BETAMETHASONE SODIUM PHOSPHATE AND BETAMETHASONE ACETATE 3; 3 MG/ML; MG/ML
12 INJECTION, SUSPENSION INTRA-ARTICULAR; INTRALESIONAL; INTRAMUSCULAR; SOFT TISSUE
Status: COMPLETED | OUTPATIENT
Start: 2018-02-12 | End: 2018-02-12

## 2018-02-12 RX ORDER — HYDROXYZINE HYDROCHLORIDE 25 MG/1
25 TABLET, FILM COATED ORAL 3 TIMES DAILY PRN
Qty: 12 TABLET | Refills: 0 | Status: SHIPPED | OUTPATIENT
Start: 2018-02-12 | End: 2018-02-17

## 2018-02-12 RX ADMIN — BETAMETHASONE SODIUM PHOSPHATE AND BETAMETHASONE ACETATE 12 MG: 3; 3 INJECTION, SUSPENSION INTRA-ARTICULAR; INTRALESIONAL; INTRAMUSCULAR; SOFT TISSUE at 06:02

## 2018-02-13 LAB
6MAM UR QL: NOT DETECTED
7AMINOCLONAZEPAM UR QL: NOT DETECTED
A-OH ALPRAZ UR QL: NOT DETECTED
ALPRAZ UR QL: NOT DETECTED
AMPHET UR QL SCN: NOT DETECTED
ANNOTATION COMMENT IMP: NORMAL
ANNOTATION COMMENT IMP: NORMAL
BARBITURATES UR QL: NOT DETECTED
BUPRENORPHINE UR QL: NOT DETECTED
BZE UR QL: NOT DETECTED
CARBOXYTHC UR QL: NOT DETECTED
CARISOPRODOL UR QL: NOT DETECTED
CLONAZEPAM UR QL: NOT DETECTED
CODEINE UR QL: NOT DETECTED
CREAT UR-MCNC: 135.7 MG/DL (ref 20–400)
DIAZEPAM UR QL: NOT DETECTED
ETHYL GLUCURONIDE UR QL: NOT DETECTED
FENTANYL UR QL: NOT DETECTED
HYDROCODONE UR QL: NOT DETECTED
HYDROMORPHONE UR QL: NOT DETECTED
LORAZEPAM UR QL: NOT DETECTED
MDA UR QL: NOT DETECTED
MDEA UR QL: NOT DETECTED
MDMA UR QL: NOT DETECTED
ME-PHENIDATE UR QL: NOT DETECTED
MEPERIDINE UR QL: NOT DETECTED
METHADONE UR QL: NOT DETECTED
METHAMPHET UR QL: NOT DETECTED
MIDAZOLAM UR QL SCN: NOT DETECTED
MORPHINE UR QL: NOT DETECTED
NORBUPRENORPHINE UR QL CFM: NOT DETECTED
NORDIAZEPAM UR QL: NOT DETECTED
NORFENTANYL UR QL: NOT DETECTED
NORHYDROCODONE UR QL CFM: NOT DETECTED
NOROXYCODONE UR QL CFM: PRESENT
NOROXYMORPHONE: NOT DETECTED
OXAZEPAM UR QL: NOT DETECTED
OXYCODONE UR QL: PRESENT
OXYMORPHONE UR QL: NOT DETECTED
PATHOLOGY STUDY: NORMAL
PCP UR QL: NOT DETECTED
PHENTERMINE UR QL: NOT DETECTED
PROPOXYPH UR QL: NOT DETECTED
SERVICE CMNT-IMP: NORMAL
TAPENTADOL UR QL SCN: NOT DETECTED
TAPENTADOL-O-SULF: NOT DETECTED
TEMAZEPAM UR QL: NOT DETECTED
TRAMADOL UR QL: NOT DETECTED
ZOLPIDEM UR QL: NOT DETECTED

## 2018-02-13 NOTE — PATIENT INSTRUCTIONS
Please drink plenty of fluids.  Please get plenty of rest.    Please go to the Emergency Department for any concerns or worsening of condition.    If you were given a steroid shot in the clinic and have also been given a prescription for a steroid such as Prednisone, please begin taking them tomorrow.    Please follow up with your primary care doctor or specialist as needed.    If you  smoke, please stop smoking.  Food Allergy  The best way to deal with food allergies is to avoid the foods you are allergic to. Understand and be aware of the foods that you have reacted to. Also be cautious of foods or dishes that may have flavorings or small amounts of foods that you are allergic to.  Symptoms of food allergy may begin within minutes, but can start 2 hours after eating or later. Common symptoms can include:  · Nausea  · Vomiting  · Diarrhea or stomach cramps  · Iitchy rash (hives)  · Swelling of the eyes, lips, face or tongue  · Wheezing  · Difficulty breathing or swallowing  · Throat tightness  · Dizziness or fainting  This kind of allergic reaction, called anaphylaxis, can be life-threatening. In mild and moderate cases the symptoms usually begin improving within 6 to 24 hours. People with certain health problems, such as asthma and eczema, may be more likely to have food allergies. Foods that people are most commonly allergic to are milk or dairy products, eggs, peanuts, tree nuts, soy, shellfish, and wheat. Remember that any food can cause a reaction. Treatment for a severe allergic reaction can include epinephrine. If you have a severe food allergy, or have had severe allergic reactions even if you don't know the cause, you should carry this medicine with you for self-injection. It is available by prescription. It is also available in a lower dose form for children from your healthcare provider.  Home care  The following guidelines will help you care for yourself at home:  · If your symptoms were moderate to  "severe, they may fluctuate for the next 24 hours. It may be best to rest at home during that time.  · Avoid tobacco and alcohol because they can make symptoms worse. They can also interact with the medicines you are taking to treat the allergic reaction.  · If you know what foods caused your reaction today, avoid them in the future. The next and each reaction after this may make your body more sensitive to these foods. This can cause a worse reaction later. Tell your family members, friends, and doctors about your food allergy, especially in an emergency situation since they need to know how to give you epinephrine if you are unable to. This can be life-saving.  · Learn how to read food labels so you can check for the substance that you reacted to. If a food does not have a label, it is best to avoid it. When in restaurants, ask about ingredients and tell the staff, "If I eat a dish containing (food you are allergic to), I could have a severe allergic reaction."  · If your reaction was severe, get a medical alert bracelet or necklace that notes your allergy.  · If epinephrine is prescribed, carry it with you at all times. Learn how to use the device. If you begin to feel the symptoms of another reaction, use the epinephrine to inject yourself right away, and call 911. Dont wait until symptoms become severe.  · Oral allergy medicines (diphenhydramine) are antihistamines that can help with the reaction. You can buy them at any pharmacy or supermarket. They come in liquids, pills, or capsules. Unless your doctor gave you a prescription antihistamine, you can use these medicines to ease itching. Allergy medicines can make you sleepy, so be careful, especially when driving or working. For this reason, you may want to use lower doses during the day and save the higher doses for bedtime. Don't use diphenhydramine if you have glaucoma or if you are a man with trouble urinating because of an enlarged prostate.  · If allergy " medicines with diphenhydramine make you too sleepy, talk with your healthcare provider. He or she can recommend an over-the counter antihistamine that won't make you sleepy. These may not work as well, though.  Follow-up care  Follow up with your healthcare provider if your symptoms don't get better over the next 2 to 3 days. If you don't know what caused this reaction, your provider may order skin tests and blood tests, or an elimination diet. You can find an allergy specialist in your area by contacting:  · American Academy of Allergy, Asthma & Immunology, www.aaaai.org  · American College of Allergy, Asthma & Immunology, www.acaai.org  When to seek medical advice  Call your [facility] provider right away if any of these occur:  · Your symptoms get worse  · New or worse swelling in the face, eyelids, lips, mouth, throat, or tongue  · Mild trouble swallowing, breathing, or wheezing  · Fever of 100.4°F (38.0°C) or higher, or as directed by your health care provider  · Severe abdominal pain  · Persistent vomiting (unable to keep liquids down) or constant diarrhea  · Blood or mucus in the stool  Call 911  If any of these occur, give yourself injectable epinephrine and call 911:  · Significant trouble breathing, talking, or swallowing  · Any change in level of alertness or unconsciousness, including dizziness, weakness, or fainting  · Cool, moist skin  · Fast, weak hearbeat  · Severe wheezing  · Hives  · Severe swelling of the face, tongue, or lips  · Drooling  · Vomiting that happens soon after eating a food you think you are allergic to  · Explosive diarrhea  Date Last Reviewed: 1/11/2016 © 2000-2017 [employer]. [address] [place] [address], [place] [address]. All rights reserved. This information is not intended as a substitute for professional medical care. Always follow your healthcare professional's instructions.

## 2018-02-13 NOTE — PROGRESS NOTES
Subjective:       Patient ID: Alivia Thomas is a 52 y.o. female.    Vitals:    02/12/18 1842   BP: (!) 160/97   Pulse: 88   Resp: 18   Temp: 98.6 °F (37 °C)       Chief Complaint: Urticaria    Pt states she had an anaphylactic reaction to strawberries when she was in college, and thinks she came into contact with some strawberry juice on her skin earlier today.  Pt states she has had generalized itching since and has tried over the counter benadryl with no relief. Denies SOB, tightness in chest, or difficulty breathing.      Urticaria   This is a new problem. The current episode started in the past 7 days. The problem has been gradually worsening since onset. Location: all over  Pertinent negatives include no fever, joint pain, shortness of breath or sore throat. Past treatments include antihistamine. The treatment provided no relief. There is no history of asthma or eczema.     Review of Systems   Constitution: Negative for chills and fever.   HENT: Negative for sore throat.    Respiratory: Negative for shortness of breath.    Skin: Positive for itching and rash.   Musculoskeletal: Negative for joint pain.       Objective:      Physical Exam   Constitutional: She is oriented to person, place, and time. Vital signs are normal. She appears well-developed and well-nourished. She is cooperative.  Non-toxic appearance. She does not have a sickly appearance. She does not appear ill. No distress.   HENT:   Head: Normocephalic and atraumatic.   Right Ear: Hearing, tympanic membrane, external ear and ear canal normal.   Left Ear: Hearing, tympanic membrane, external ear and ear canal normal.   Nose: Nose normal. No mucosal edema or rhinorrhea.   Mouth/Throat: Uvula is midline. No posterior oropharyngeal erythema. Tonsils are 3+ on the right. Tonsils are 3+ on the left. No tonsillar exudate.   Eyes: Conjunctivae and lids are normal.   Neck: Normal range of motion and full passive range of motion without pain. Neck  supple. No neck rigidity. No edema, no erythema and normal range of motion present.   Cardiovascular: Normal rate, regular rhythm and normal heart sounds.    Pulmonary/Chest: Effort normal and breath sounds normal. No accessory muscle usage. No apnea, no tachypnea and no bradypnea. No respiratory distress. She has no decreased breath sounds. She has no wheezes. She has no rhonchi. She has no rales.   Abdominal: Normal appearance.   Lymphadenopathy:        Head (right side): No submental, no submandibular, no tonsillar, no preauricular, no posterior auricular and no occipital adenopathy present.        Head (left side): No submental, no submandibular, no tonsillar, no preauricular, no posterior auricular and no occipital adenopathy present.     She has cervical adenopathy.        Right cervical: Superficial cervical adenopathy present.        Left cervical: Superficial cervical adenopathy present.   Neurological: She is alert and oriented to person, place, and time.   Psychiatric: She has a normal mood and affect. Her speech is normal and behavior is normal.   Nursing note and vitals reviewed.      Assessment:       1. Allergic reaction to food, initial encounter        Plan:       Alivia was seen today for urticaria.    Diagnoses and all orders for this visit:    Allergic reaction to food, initial encounter  -     betamethasone acetate-betamethasone sodium phosphate injection 12 mg; Inject 2 mLs (12 mg total) into the muscle one time.  -     predniSONE (DELTASONE) 20 MG tablet; Take 1 tablet (20 mg total) by mouth once daily.  -     hydrOXYzine HCl (ATARAX) 25 MG tablet; Take 1 tablet (25 mg total) by mouth 3 (three) times daily as needed for Itching.    Pt instructed to go to the ER immediately for any worsening of symptoms.

## 2018-02-19 ENCOUNTER — TELEPHONE (OUTPATIENT)
Dept: INTERNAL MEDICINE | Facility: CLINIC | Age: 53
End: 2018-02-19

## 2018-02-19 NOTE — TELEPHONE ENCOUNTER
Called pt back and she states she is in [place] and is having left abdominal pain and right shoulder pain. She also states that she threw up and defecated on herself last night and is having body aches and chill she said she was going to wait until she got home ronit night but I spoke with Dr Richardson and stated she should go to the ER out there tonight because if something happens on the plane tomorrow theres not much that can be done pt verbalized understanding and I believe she will go to the ER tonight but will call tomorrow and check on her

## 2018-02-19 NOTE — TELEPHONE ENCOUNTER
----- Message from Jennie Youssef sent at 2/19/2018  3:22 PM CST -----  Contact: patient's sister's phone [phone]  Pt is in [place] and won't be back until late tomorrow night. Pt requested that ou fit her in asap/ Wednesday or anytime after that. She is experiencing severe pain in abdomen/ reflex/nausea/vomiting and chills/ and thinks it may be her gall bladder. Please try to fit pt in and call her to confirm.

## 2018-02-20 ENCOUNTER — HOSPITAL ENCOUNTER (EMERGENCY)
Facility: HOSPITAL | Age: 53
Discharge: HOME | End: 2018-02-21
Attending: EMERGENCY MEDICINE
Payer: MEDICARE

## 2018-02-20 DIAGNOSIS — K52.9 GASTROENTERITIS: Primary | ICD-10-CM

## 2018-02-20 DIAGNOSIS — E11.9 DM2 (DIABETES MELLITUS, TYPE 2): ICD-10-CM

## 2018-02-20 DIAGNOSIS — I10 HTN (HYPERTENSION): ICD-10-CM

## 2018-02-20 DIAGNOSIS — R10.13 EPIGASTRIC PAIN: ICD-10-CM

## 2018-02-20 LAB
ALBUMIN SERPL BCP-MCNC: 3.3 G/DL
ALP SERPL-CCNC: 58 U/L
ALT SERPL W/O P-5'-P-CCNC: 12 U/L
ANION GAP SERPL CALC-SCNC: 8 MMOL/L
AST SERPL-CCNC: 13 U/L
B-HCG UR QL: NEGATIVE
BASOPHILS # BLD AUTO: 0.04 K/UL
BASOPHILS NFR BLD: 0.4 %
BILIRUB SERPL-MCNC: 0.4 MG/DL
BILIRUB UR QL STRIP: NEGATIVE
BUN SERPL-MCNC: 9 MG/DL
CALCIUM SERPL-MCNC: 9.3 MG/DL
CHLORIDE SERPL-SCNC: 107 MMOL/L
CLARITY UR REFRACT.AUTO: CLEAR
CO2 SERPL-SCNC: 24 MMOL/L
COLOR UR AUTO: YELLOW
CREAT SERPL-MCNC: 0.7 MG/DL
CTP QC/QA: YES
DIFFERENTIAL METHOD: ABNORMAL
EOSINOPHIL # BLD AUTO: 0.2 K/UL
EOSINOPHIL NFR BLD: 1.6 %
ERYTHROCYTE [DISTWIDTH] IN BLOOD BY AUTOMATED COUNT: 15.2 %
EST. GFR  (AFRICAN AMERICAN): >60 ML/MIN/1.73 M^2
EST. GFR  (NON AFRICAN AMERICAN): >60 ML/MIN/1.73 M^2
GLUCOSE SERPL-MCNC: 117 MG/DL
GLUCOSE UR QL STRIP: NEGATIVE
HCT VFR BLD AUTO: 35 %
HGB BLD-MCNC: 10.8 G/DL
HGB UR QL STRIP: NEGATIVE
IMM GRANULOCYTES # BLD AUTO: 0.03 K/UL
IMM GRANULOCYTES NFR BLD AUTO: 0.3 %
KETONES UR QL STRIP: NEGATIVE
LEUKOCYTE ESTERASE UR QL STRIP: NEGATIVE
LIPASE SERPL-CCNC: 20 U/L
LYMPHOCYTES # BLD AUTO: 2.8 K/UL
LYMPHOCYTES NFR BLD: 27 %
MCH RBC QN AUTO: 26.2 PG
MCHC RBC AUTO-ENTMCNC: 30.9 G/DL
MCV RBC AUTO: 85 FL
MONOCYTES # BLD AUTO: 0.8 K/UL
MONOCYTES NFR BLD: 8 %
NEUTROPHILS # BLD AUTO: 6.6 K/UL
NEUTROPHILS NFR BLD: 62.7 %
NITRITE UR QL STRIP: NEGATIVE
NRBC BLD-RTO: 0 /100 WBC
PH UR STRIP: 6 [PH] (ref 5–8)
PLATELET # BLD AUTO: 346 K/UL
PMV BLD AUTO: 11.1 FL
POTASSIUM SERPL-SCNC: 4.2 MMOL/L
PROT SERPL-MCNC: 7.9 G/DL
PROT UR QL STRIP: NEGATIVE
RBC # BLD AUTO: 4.13 M/UL
SODIUM SERPL-SCNC: 139 MMOL/L
SP GR UR STRIP: 1.02 (ref 1–1.03)
URN SPEC COLLECT METH UR: NORMAL
UROBILINOGEN UR STRIP-ACNC: NEGATIVE EU/DL
WBC # BLD AUTO: 10.5 K/UL

## 2018-02-20 PROCEDURE — 96375 TX/PRO/DX INJ NEW DRUG ADDON: CPT

## 2018-02-20 PROCEDURE — 83690 ASSAY OF LIPASE: CPT

## 2018-02-20 PROCEDURE — 63600175 PHARM REV CODE 636 W HCPCS

## 2018-02-20 PROCEDURE — 96361 HYDRATE IV INFUSION ADD-ON: CPT

## 2018-02-20 PROCEDURE — 25000003 PHARM REV CODE 250

## 2018-02-20 PROCEDURE — 99284 EMERGENCY DEPT VISIT MOD MDM: CPT | Mod: 25

## 2018-02-20 PROCEDURE — 80053 COMPREHEN METABOLIC PANEL: CPT

## 2018-02-20 PROCEDURE — 81003 URINALYSIS AUTO W/O SCOPE: CPT

## 2018-02-20 PROCEDURE — 96374 THER/PROPH/DIAG INJ IV PUSH: CPT | Mod: 59

## 2018-02-20 PROCEDURE — 93005 ELECTROCARDIOGRAM TRACING: CPT

## 2018-02-20 PROCEDURE — 25500020 PHARM REV CODE 255: Performed by: EMERGENCY MEDICINE

## 2018-02-20 PROCEDURE — 99285 EMERGENCY DEPT VISIT HI MDM: CPT | Mod: ,,, | Performed by: EMERGENCY MEDICINE

## 2018-02-20 PROCEDURE — 81025 URINE PREGNANCY TEST: CPT | Performed by: EMERGENCY MEDICINE

## 2018-02-20 PROCEDURE — 93010 ELECTROCARDIOGRAM REPORT: CPT | Mod: ,,, | Performed by: INTERNAL MEDICINE

## 2018-02-20 PROCEDURE — S0028 INJECTION, FAMOTIDINE, 20 MG: HCPCS

## 2018-02-20 PROCEDURE — 85025 COMPLETE CBC W/AUTO DIFF WBC: CPT

## 2018-02-20 RX ORDER — KETOROLAC TROMETHAMINE 30 MG/ML
15 INJECTION, SOLUTION INTRAMUSCULAR; INTRAVENOUS
Status: COMPLETED | OUTPATIENT
Start: 2018-02-20 | End: 2018-02-20

## 2018-02-20 RX ORDER — FAMOTIDINE 10 MG/ML
20 INJECTION INTRAVENOUS
Status: COMPLETED | OUTPATIENT
Start: 2018-02-20 | End: 2018-02-20

## 2018-02-20 RX ORDER — ONDANSETRON 2 MG/ML
4 INJECTION INTRAMUSCULAR; INTRAVENOUS
Status: COMPLETED | OUTPATIENT
Start: 2018-02-20 | End: 2018-02-20

## 2018-02-20 RX ADMIN — IOHEXOL 100 ML: 350 INJECTION, SOLUTION INTRAVENOUS at 10:02

## 2018-02-20 RX ADMIN — SODIUM CHLORIDE 1000 ML: 0.9 INJECTION, SOLUTION INTRAVENOUS at 10:02

## 2018-02-20 RX ADMIN — FAMOTIDINE 20 MG: 10 INJECTION, SOLUTION INTRAVENOUS at 10:02

## 2018-02-20 RX ADMIN — ONDANSETRON 4 MG: 2 INJECTION INTRAMUSCULAR; INTRAVENOUS at 10:02

## 2018-02-20 RX ADMIN — KETOROLAC TROMETHAMINE 15 MG: 30 INJECTION, SOLUTION INTRAMUSCULAR at 10:02

## 2018-02-21 VITALS
HEIGHT: 65 IN | BODY MASS INDEX: 48.82 KG/M2 | WEIGHT: 293 LBS | RESPIRATION RATE: 18 BRPM | OXYGEN SATURATION: 98 % | DIASTOLIC BLOOD PRESSURE: 79 MMHG | SYSTOLIC BLOOD PRESSURE: 125 MMHG | TEMPERATURE: 99 F | HEART RATE: 72 BPM

## 2018-02-21 LAB
BUN SERPL-MCNC: 9 MG/DL (ref 6–30)
CHLORIDE SERPL-SCNC: ABNORMAL MMOL/L
CREAT SERPL-MCNC: 0.6 MG/DL (ref 0.5–1.4)
GLUCOSE SERPL-MCNC: 120 MG/DL (ref 70–110)
HCT VFR BLD CALC: 34 %PCV (ref 36–54)
POC IONIZED CALCIUM: 1.08 MMOL/L (ref 1.06–1.42)
POC TCO2 (MEASURED): 26 MMOL/L (ref 23–29)
POTASSIUM BLD-SCNC: 4 MMOL/L (ref 3.5–5.1)
SAMPLE: ABNORMAL
SODIUM BLD-SCNC: 139 MMOL/L (ref 136–145)

## 2018-02-21 NOTE — ED NOTES
Two patient identifiers checked and confirmed.    APPEARANCE: Resting comfortably in no acute distress. Patient has clean hair, skin and nails. Clothing is appropriate and properly fastened.  NEURO: Awake, alert, appropriate for age, and cooperative with a calm affect; pupils equal and round.  HEENT: Head symmetrical. Bilateral eyes without redness or drainage. Pt reports some chills. Pt reports feeling dizziness.   CARDIAC: Regular rate and rhythm.  RESPIRATORY: Airway is open and patent. Respirations are spontaneous on room air. Normal respiratory effort and rate noted.  GI/: Abdomen soft and non-distended. Patient is reported to void  appropriately for age. Pt having diarrhea. Pt reports nausea with frequent emesis. Pt reports abdominal pain to LLQ. Pt has active bowel sounds x4 quads.   NEUROVASCULAR: All extremities are warm and pink with +2 pulses and capillary refill less than 3 seconds.  MUSCULOSKELETAL: Moves all extremities well; no obvious deformities noted.  SKIN: Warm and dry, adequate turgor, mucus membranes moist and pink

## 2018-02-21 NOTE — ED PROVIDER NOTES
Encounter Date: 2/20/2018       History     Chief Complaint   Patient presents with    Diarrhea     x 1 month, began vomiting today.     52 y.o.. female with medical history of DM type 2, HLD, HTN and morbid obesity presents to ED with diarrhea x 1 month. Patient also c/o abdominal pain located to epigastric and LLQ region. Pain described as a burning sensation. Pain worsened after eating. She denies chest pain, shortness of breath, melena, BRBPR, dysuria, hematuria, flank pain, URI symptoms, fever.           Review of patient's allergies indicates:   Allergen Reactions    [place]      Past Medical History:   Diagnosis Date    Asthma     Diabetes mellitus type II     DJD (degenerative joint disease) of knee 6/19/2014    Hyperlipidemia     Morbid obesity     Sleep apnea      Past Surgical History:   Procedure Laterality Date    CARPAL TUNNEL RELEASE      CARPAL TUNNEL RELEASE  1980s    left    CARPAL TUNNEL RELEASE  2012    right    JOINT REPLACEMENT Bilateral     with 2 revisions on rt    KNEE SURGERY  3/2010    orthroscope    KNEE SURGERY  6-19-14    left TKR     Family History   Problem Relation Age of Onset    Diabetes Mother     Hypertension Mother     Cataracts Mother     Diabetes Father     Cataracts Father     Coronary artery disease Brother     Amblyopia Neg Hx     Blindness Neg Hx     Cancer Neg Hx     Glaucoma Neg Hx     Macular degeneration Neg Hx     Retinal detachment Neg Hx     Strabismus Neg Hx     Stroke Neg Hx     Thyroid disease Neg Hx      Social History   Substance Use Topics    Smoking status: Never Smoker    Smokeless tobacco: Never Used    Alcohol use Yes      Comment: occasionally      Review of Systems   Constitutional: Positive for chills. Negative for diaphoresis, fatigue and fever.   HENT: Negative for congestion and sore throat.    Eyes: Negative for visual disturbance.   Respiratory: Negative for shortness of breath.    Cardiovascular: Negative for  chest pain.   Gastrointestinal: Positive for abdominal pain, diarrhea and nausea. Negative for vomiting.   Genitourinary: Negative for dysuria and flank pain.   Musculoskeletal: Negative for back pain and myalgias.   Skin: Negative for rash.   Neurological: Negative for weakness, light-headedness and headaches.   Hematological: Does not bruise/bleed easily.   Psychiatric/Behavioral: The patient is not nervous/anxious.        Physical Exam     Initial Vitals [02/20/18 2021]   BP Pulse Resp Temp SpO2   (!) 169/101 99 16 98.9 °F (37.2 °C) 96 %      MAP       123.67         Physical Exam    Vitals reviewed.  Constitutional: Vital signs are normal. She appears well-developed and well-nourished. She is not diaphoretic. No distress.   HENT:   Head: Normocephalic and atraumatic.   Nose: Nose normal.   Mouth/Throat: Oropharynx is clear and moist. Mucous membranes are not dry. No posterior oropharyngeal edema, posterior oropharyngeal erythema or tonsillar abscesses.   Eyes: Conjunctivae and lids are normal. Pupils are equal, round, and reactive to light. Lids are everted and swept, no foreign bodies found.   Neck: Trachea normal and normal range of motion. Neck supple. No thyromegaly present.   Cardiovascular: Normal rate, regular rhythm, intact distal pulses and normal pulses.   Pulmonary/Chest: Breath sounds normal. She has no wheezes. She has no rhonchi. She has no rales.   Abdominal: Soft. Normal appearance and bowel sounds are normal. There is tenderness. There is no rebound.   Musculoskeletal: She exhibits no edema.   Lymphadenopathy:     She has no cervical adenopathy.   Neurological: She is alert and oriented to person, place, and time. She has normal strength. No sensory deficit.   Skin: Skin is warm. Capillary refill takes less than 2 seconds. No rash noted. No cyanosis.   Psychiatric: She has a normal mood and affect.         ED Course   Procedures  Labs Reviewed   CBC W/ AUTO DIFFERENTIAL - Abnormal; Notable for  the following:        Result Value    Hemoglobin 10.8 (*)     Hematocrit 35.0 (*)     MCH 26.2 (*)     MCHC 30.9 (*)     RDW 15.2 (*)     All other components within normal limits   COMPREHENSIVE METABOLIC PANEL - Abnormal; Notable for the following:     Glucose 117 (*)     Albumin 3.3 (*)     All other components within normal limits   LIPASE   URINALYSIS, REFLEX TO URINE CULTURE    Narrative:     Preferred Collection Type->Urine, Clean Catch   POCT URINE PREGNANCY   ISTAT CHEM8        Imaging Results          CT Abdomen Pelvis With Contrast (Final result)  Result time 02/20/18 23:40:41    Final result by Theodore Gomez MD (02/20/18 23:40:41)                 Impression:        1. No acute cause of this patient's abdominal pain is identified on this artifact limited exam.    2. Enlarged uterus with large hypoenhancing uterine lesions, possibly fibroids, not definitively seen on prior CT on 02/28/2012. Recommend correlation with nonemergent pelvic ultrasound/gynecologic followup and clinical findings.    3. Left ovarian cystic lesion measuring up to 4.6 cm, which can also be evaluated with nonemergent pelvic ultrasound followup.    4.  Additional findings include:  -Large gallstone in the region of the gallbladder neck  -Small hiatal hernia  -Lower lumbar facet arthropathy  ______________________________________     Electronically signed by resident: DARREN SAUER MD  Date:     02/20/18  Time:    23:20            As the supervising and teaching physician, I personally reviewed the images and resident's interpretation and I agree with the findings.          Electronically signed by: Theodore Gomez  Date:     02/20/18  Time:    23:40              Narrative:    Procedure comments: The patient was surveyed from the lung bases through the pelvis after the administration of 100 cc Omni 350 IV contrast and data was reconstructed for coronal, sagittal, and axial images.    Comparison: CT abdomen pelvis with contrast  2/28/2012.    Findings:  Examination is significantly limited by beam hardening and truncation artifacts in the lower abdomen and pelvis related to the patient's soft tissues touching the CT gantry.    The lung bases are unremarkable.  There is no pleural fluid present.  The visualized portions of the heart appear normal.    The liver is normal in size and attenuation with no focal hepatic abnormality.  The gallbladder contains a 2.5 cm stone, unchanged since 2/28/2012, without evidence of wall thickening or pericholecystic fluid to indicate cholecystitis.  There is no intra-or extrahepatic biliary ductal dilatation.    The stomach, spleen, pancreas, and adrenal glands are unremarkable.  There is a small hiatal hernia.    The kidneys are normal in size and location and concentrate and excrete contrast properly on delayed imaging.  There is no evidence of hydronephrosis.  The ureters appear normal in course and caliber without evidence of ureteral dilatation. The urinary bladder is unremarkable.    The uterus appears enlarged with a large rounded hypoenhancing lesion at the uterine fundus measuring up to 9 cm in size. Additional bulky lesion may be present at the posterior uterine segment measuring up to 6.7 cm. These findings were not definitely present on CT 2/28/2012.  A hypodense lesion of the left ovary measures 4.6 x 4.0 cm.    The abdominal aorta is normal in course and caliber without significant atherosclerotic calcifications.    The visualized loops of small and large bowel show no evidence of obstruction or inflammation.  A normal appendix is seen.  There is no ascites, free fluid, or intraperitoneal free air. There is no evidence of lymph node enlargement in the abdomen or pelvis.    Osseous structures demonstrate mild degenerative change of the spine with prominent facet arthropathy of the lower lumbar spine. Mild anterolisthesis of L4 with respect L5.  The extraperitoneal soft tissues are unremarkable.                                  Medical Decision Making:   History:   Old Medical Records: I decided to obtain old medical records.  Old Records Summarized: records from clinic visits.  Initial Assessment:   52 y.o.. female with medical history of DM type 2, HLD, HTN and morbid obesity presents to ED with diarrhea x 1 month.  Differential Diagnosis:   DDX includes but is not limited to gastroenteritis, GERD, diverticulitis, UTI, electrolyte derangement.   Clinical Tests:   Lab Tests: Ordered and Reviewed  Radiological Study: Ordered and Reviewed  Medical Tests: Ordered and Reviewed  ED Management:  Will get labs, CT abdomen pelvis with contrast, IVF, IV pepcid 20mg, IV toradol 15mg and IV zofran 4mg.     Labs benign. No signs of UTI, pancreatitis or infectious process. CT abdomen shows no acute cause of abdominal pain. Notified patient of enlarged uterus and for her to follow up with OB/GYN for non emergent pelvic exam. Advise immodium for diarrhea and pepcid for acid reflux symptoms.Discharged to home in stable condition, return to ED warnings given, follow up and patient care instructions given.      I have discussed the treatment and management of this patient with my supervisory physician, and we agree on the plan of care.                         Clinical Impression:   The primary encounter diagnosis was Gastroenteritis. A diagnosis of Epigastric pain was also pertinent to this visit.    Disposition:   Disposition: Discharged  Condition: Stable                        Chanelle Veronica PA-C  02/21/18 0041

## 2018-02-21 NOTE — DISCHARGE INSTRUCTIONS
May take immodium over the counter for diarrhea. Encourage pepcid for acid reflux symptoms. Please follow up with PCP in 1-2 weeks.

## 2018-02-21 NOTE — ED TRIAGE NOTES
Pt reports she was told to come to ER by PCP because she has been having diarrhea and emesis x4 days. Pt reports 3-4 episodes of diarrhea today.  Pt also reports LLQ abdominal pain and chills, unknown if fever.

## 2018-02-22 ENCOUNTER — PES CALL (OUTPATIENT)
Dept: ADMINISTRATIVE | Facility: CLINIC | Age: 53
End: 2018-02-22

## 2018-02-26 ENCOUNTER — TELEPHONE (OUTPATIENT)
Dept: OBSTETRICS AND GYNECOLOGY | Facility: CLINIC | Age: 53
End: 2018-02-26

## 2018-02-26 NOTE — TELEPHONE ENCOUNTER
Spoke to patient. Patient called to see if she had to reschedule due to cycle. Informed patient that appointment is a follow up for her embx and is not due for annual which was 10/2017. Advised that she could keep her appointment.

## 2018-02-26 NOTE — TELEPHONE ENCOUNTER
----- Message from Radha Cruz sent at 2/26/2018  2:57 PM CST -----  Contact: Patient   X _1st Request  _  2nd Request  _  3rd Request    Who:DONTAE MOON [[id number]]    Why:Patient is requesting a call back to reschedule her appointment she states her menstrual cycle came down and they are no appointments available to reschedule her     What Number to Call Back:[phone]    When to Expect a call back: (Before the end of the day)   -- if call after 3:00 call back will be tomorrow.

## 2018-03-01 ENCOUNTER — OFFICE VISIT (OUTPATIENT)
Dept: OBSTETRICS AND GYNECOLOGY | Facility: CLINIC | Age: 53
End: 2018-03-01
Payer: MEDICARE

## 2018-03-01 VITALS
HEIGHT: 65 IN | DIASTOLIC BLOOD PRESSURE: 86 MMHG | SYSTOLIC BLOOD PRESSURE: 132 MMHG | WEIGHT: 293 LBS | BODY MASS INDEX: 48.82 KG/M2

## 2018-03-01 DIAGNOSIS — G56.00 CARPAL TUNNEL SYNDROME, UNSPECIFIED LATERALITY: ICD-10-CM

## 2018-03-01 DIAGNOSIS — Z12.31 VISIT FOR SCREENING MAMMOGRAM: ICD-10-CM

## 2018-03-01 DIAGNOSIS — N92.6 IRREGULAR MENSTRUAL CYCLE: Primary | ICD-10-CM

## 2018-03-01 DIAGNOSIS — E66.01 MORBID OBESITY: ICD-10-CM

## 2018-03-01 DIAGNOSIS — Z96.653 STATUS POST TOTAL BILATERAL KNEE REPLACEMENT: ICD-10-CM

## 2018-03-01 DIAGNOSIS — E78.5 HYPERLIPIDEMIA, UNSPECIFIED HYPERLIPIDEMIA TYPE: ICD-10-CM

## 2018-03-01 PROCEDURE — 99213 OFFICE O/P EST LOW 20 MIN: CPT | Mod: PBBFAC | Performed by: OBSTETRICS & GYNECOLOGY

## 2018-03-01 PROCEDURE — 99213 OFFICE O/P EST LOW 20 MIN: CPT | Mod: S$PBB,,, | Performed by: OBSTETRICS & GYNECOLOGY

## 2018-03-01 PROCEDURE — 99999 PR PBB SHADOW E&M-EST. PATIENT-LVL III: CPT | Mod: PBBFAC,,, | Performed by: OBSTETRICS & GYNECOLOGY

## 2018-03-01 RX ORDER — TIZANIDINE 2 MG/1
4 TABLET ORAL EVERY 6 HOURS PRN
COMMUNITY
End: 2018-06-01

## 2018-03-01 NOTE — PROGRESS NOTES
HISTORY OF PRESENT ILLNESS:    Alivia Thomas is a 52 y.o. female  No LMP recorded. presents today for follow up.     Patient seen in April for WWE. Cycles at that time were every 2-3 months with light bleeding.   In October, cycles occur every 2 months lasting 7-10 days using 5-6 tampons per day. Cycles previously 5-7 days with same amount of bleeding.   Had EMBX done 2017 - benign     No cycle in Nov, Dec. Cycle in  & Feb - heavy, using 4-5 pads per day     External Result Report     External Result Report   Narrative     Transabdominal and transvaginal pelvic ultrasound examination was performed.  The uterus is enlarged measuring 16.1 x 11.4 x 10.5 cm.  There is a subserosal fibroid originating from the fundus of the uterus measuring 8.9 x 8.6 x 8.6 cm.  The endometrial stripe is not clearly delineated.  The right ovary measures 8.9 x 2.6 x 2.7 cm in size and contains a dominant follicle measuring 2.2 cm in greatest dimension.  There is arterial and venous blood flow identified within the right ovary.  The left ovary was not visualized.  No free fluid is identified within the pelvis.   Impression       Enlarged uterus with single subserosal fibroid within the fundus of the uterus measuring 8.9 cm in greatest dimension. Left ovary was not visualized.          Past Medical History:   Diagnosis Date    Asthma     Diabetes mellitus type II     DJD (degenerative joint disease) of knee 2014    Hyperlipidemia     Morbid obesity     Sleep apnea        Past Surgical History:   Procedure Laterality Date    CARPAL TUNNEL RELEASE      CARPAL TUNNEL RELEASE  1980s    left    CARPAL TUNNEL RELEASE      right    JOINT REPLACEMENT Bilateral     with 2 revisions on rt    KNEE SURGERY  3/2010    orthroscope    KNEE SURGERY  14    left TKR       MEDICATIONS AND ALLERGIES:      Current Outpatient Prescriptions:     albuterol 90 mcg/actuation inhaler, Take 2 puffs every 4-6 hours  as needed for  shortness of breath/wheezing, Disp: 1 Inhaler, Rfl: 6    aspirin (ECOTRIN) 81 MG EC tablet, Take 1 tablet by mouth every morning. prevents heart attacks and strokes, Disp: , Rfl:     atorvastatin (LIPITOR) 20 MG tablet, Take 1 tablet (20 mg total) by mouth once daily., Disp: 30 tablet, Rfl: 6    atorvastatin (LIPITOR) 20 MG tablet, TAKE 1 TABLET (20 MG TOTAL) BY MOUTH ONCE DAILY., Disp: 30 tablet, Rfl: 6    blood sugar diagnostic Strp, Freestyle light strips and lancets, Disp: 100 each, Rfl: 6    blood sugar diagnostic Strp, Check glucose four times daily Strips and lancets covered by insurance E11.9 - One touch, Disp: 120 each, Rfl: 6    blood-glucose meter (FREESTYLE SYSTEM KIT) kit, Use as instructed, Disp: 1 each, Rfl: 0    blood-glucose meter kit, Check glucose four times daily E11.9 meter covered by insurance One Touch, Disp: 1 each, Rfl: 0    celecoxib (CELEBREX) 200 MG capsule, Take 1 capsule (200 mg total) by mouth once daily., Disp: 30 capsule, Rfl: 2    diclofenac sodium (VOLTAREN) 1 % Gel, Apply 2 g topically once daily., Disp: 1 Tube, Rfl: 3    econazole nitrate 1 % cream, Apply topically 2 (two) times daily., Disp: 30 g, Rfl: 2    fluconazole (DIFLUCAN) 150 MG Tab, , Disp: , Rfl:     FLUoxetine (PROZAC) 20 MG capsule, Take 1 capsule (20 mg total) by mouth once daily., Disp: 30 capsule, Rfl: 6    fluticasone (FLONASE) 50 mcg/actuation nasal spray, 1 spray by Each Nare route 2 (two) times daily as needed for Rhinitis., Disp: 15 g, Rfl: 0    insulin detemir (LEVEMIR FLEXPEN) 100 unit/mL (3 mL) SubQ InPn pen, Inject 30 Units into the skin every evening., Disp: 1 Box, Rfl: 6    insulin lispro (HUMALOG KWIKPEN) 100 unit/mL InPn pen, 11 Units before meals plus sliding scale, Disp: 1 Box, Rfl: 6    insulin lispro (HUMALOG) 100 unit/mL injection, Inject 11 Units into the skin 3 (three) times daily before meals. Glucose        Intervention  (units to add in addition to meal time Humalog base dose  "of 11 Units)                       0-80    orange juice            0 units    151 - 200       +2 Units    201 - 250       +4 Units    251 - 300       +6 Units    301 - 350        +8 Units    351 - 400       +10 Units    > 400           Call MD, Disp: 3 mL, Rfl: 11    insulin lispro (HUMALOG) 100 unit/mL injection, 11 Units before meals plus sliding scale, Disp: 5 vial, Rfl: 6    insulin lispro (HUMALOG) 100 unit/mL injection, INJECT 11 UNITS SUBCUTANEOUSLY THREE TIMES DAILY BEFORE MEAL(S) PLUS  SLIDING  SCALE, Disp: 10 mL, Rfl: 6    lisinopril (PRINIVIL,ZESTRIL) 2.5 MG tablet, One daily for proteinuria., Disp: 30 tablet, Rfl: 6    lisinopril (PRINIVIL,ZESTRIL) 2.5 MG tablet, ONE DAILY FOR PROTEINURIA., Disp: 30 tablet, Rfl: 6    metFORMIN (GLUCOPHAGE-XR) 500 MG 24 hr tablet, Take 2 tablets (1,000 mg total) by mouth 2 (two) times daily., Disp: 360 tablet, Rfl: 6    omeprazole (PRILOSEC) 20 MG capsule, Take 1 capsule (20 mg total) by mouth every morning., Disp: 30 capsule, Rfl: 6    omeprazole (PRILOSEC) 20 MG capsule, TAKE 1 CAPSULE (20 MG TOTAL) BY MOUTH EVERY MORNING., Disp: 30 capsule, Rfl: 6    oxyCODONE-acetaminophen (PERCOCET)  mg per tablet, Take 1 tablet by mouth every 6 (six) hours as needed for Pain., Disp: 120 tablet, Rfl: 0    pen needle, diabetic 33 gauge x 5/32" Ndle, 1 application by Misc.(Non-Drug; Combo Route) route 4 (four) times daily with meals and nightly., Disp: 100 each, Rfl: 6    tiZANidine (ZANAFLEX) 2 MG tablet, Take 4 mg by mouth every 6 (six) hours as needed., Disp: , Rfl:     vitamin D 185 MG Tab, Take 5,000 mg by mouth once daily., Disp: , Rfl:     Review of patient's allergies indicates:   Allergen Reactions    Strawberry        COMPREHENSIVE GYN HISTORY:  PAP History: Denies abnormal Paps.  Infection History: Denies STDs. Denies PID.  Benign History: Denies uterine fibroids. Denies ovarian cysts. Denies endometriosis. Denies other conditions.  Cancer History: " Denies cervical cancer. Denies uterine cancer or hyperplasia. Denies ovarian cancer. Denies vulvar cancer or pre-cancer. Denies vaginal cancer or pre-cancer. Denies breast cancer. Denies colon cancer.  Sexual Activity History: Reports currently being sexually active  Menstrual History: Every 28 days, flows for 4 days. Light flow.  Dysmenorrhea History: Denies dysmenorrhea.  Contraception History:      ROS:  GENERAL: No fever or chills.  BREASTS: No pain. No lumps. No discharge.  ABDOMEN: No pain. No nausea. No vomiting. No diarrhea. No constipation.  REPRODUCTIVE: No abnormal bleeding.   VULVA: No pain. No lesions. No itching.  VAGINA: No relaxation. No itching. No odor. No discharge. No lesions.  URINARY: No incontinence. No nocturia. No frequency. No dysuria.    PE:  APPEARANCE: Well nourished, well developed, in no acute distress.  AFFECT: WNL, alert and oriented x 3.  Deferred      1. Irregular menstrual cycle    2. Carpal tunnel syndrome, unspecified laterality    3. Hyperlipidemia, unspecified hyperlipidemia type    4. Morbid obesity    5. Uncontrolled type 2 diabetes mellitus with microalbuminuria, with long-term current use of insulin    6. Status post total bilateral knee replacement        PLAN:    Orders Placed This Encounter    CBC auto differential       COUNSELING:  The patient was counseled today on irregular menses, plan conservative therapy     FOLLOW-UP with me in May  MVI daily  Labs in May

## 2018-03-06 ENCOUNTER — OFFICE VISIT (OUTPATIENT)
Dept: PAIN MEDICINE | Facility: CLINIC | Age: 53
End: 2018-03-06
Payer: MEDICARE

## 2018-03-06 VITALS
TEMPERATURE: 99 F | BODY MASS INDEX: 48.82 KG/M2 | SYSTOLIC BLOOD PRESSURE: 137 MMHG | OXYGEN SATURATION: 98 % | DIASTOLIC BLOOD PRESSURE: 94 MMHG | RESPIRATION RATE: 20 BRPM | HEIGHT: 65 IN | HEART RATE: 78 BPM | WEIGHT: 293 LBS

## 2018-03-06 DIAGNOSIS — G89.29 BILATERAL CHRONIC KNEE PAIN: ICD-10-CM

## 2018-03-06 DIAGNOSIS — G89.4 CHRONIC PAIN DISORDER: ICD-10-CM

## 2018-03-06 DIAGNOSIS — M17.0 PRIMARY OSTEOARTHRITIS OF BOTH KNEES: ICD-10-CM

## 2018-03-06 DIAGNOSIS — M25.562 BILATERAL CHRONIC KNEE PAIN: ICD-10-CM

## 2018-03-06 DIAGNOSIS — M47.816 FACET ARTHRITIS OF LUMBAR REGION: ICD-10-CM

## 2018-03-06 DIAGNOSIS — Z96.651 STATUS POST TOTAL RIGHT KNEE REPLACEMENT: ICD-10-CM

## 2018-03-06 DIAGNOSIS — M47.816 SPONDYLOSIS OF LUMBAR REGION WITHOUT MYELOPATHY OR RADICULOPATHY: Primary | ICD-10-CM

## 2018-03-06 DIAGNOSIS — M25.561 BILATERAL CHRONIC KNEE PAIN: ICD-10-CM

## 2018-03-06 DIAGNOSIS — Z79.891 ENCOUNTER FOR LONG-TERM OPIATE ANALGESIC USE: ICD-10-CM

## 2018-03-06 DIAGNOSIS — M51.36 DDD (DEGENERATIVE DISC DISEASE), LUMBAR: ICD-10-CM

## 2018-03-06 PROCEDURE — 99214 OFFICE O/P EST MOD 30 MIN: CPT | Mod: PBBFAC | Performed by: NURSE PRACTITIONER

## 2018-03-06 PROCEDURE — 99999 PR PBB SHADOW E&M-EST. PATIENT-LVL IV: CPT | Mod: PBBFAC,,, | Performed by: NURSE PRACTITIONER

## 2018-03-06 PROCEDURE — 99214 OFFICE O/P EST MOD 30 MIN: CPT | Mod: S$PBB,,, | Performed by: NURSE PRACTITIONER

## 2018-03-06 RX ORDER — OXYCODONE AND ACETAMINOPHEN 10; 325 MG/1; MG/1
1 TABLET ORAL EVERY 6 HOURS PRN
Qty: 120 TABLET | Refills: 0 | Status: SHIPPED | OUTPATIENT
Start: 2018-03-06 | End: 2018-04-06 | Stop reason: SDUPTHER

## 2018-03-06 NOTE — PROGRESS NOTES
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       +4 Units     251 - 300       +6 Units     301 - 350        +8 Units     351 - 400       +10 Units     > 400           Call MD    INSULIN LISPRO (HUMALOG) 100 UNIT/ML INJECTION    11 Units before meals plus sliding scale    INSULIN LISPRO (HUMALOG) 100 UNIT/ML INJECTION    INJECT 11 UNITS SUBCUTANEOUSLY THREE TIMES DAILY BEFORE MEAL(S) PLUS  SLIDING  SCALE    LISINOPRIL (PRINIVIL,ZESTRIL) 2.5 MG TABLET    One daily for proteinuria.    METFORMIN (GLUCOPHAGE-XR) 500 MG 24 HR TABLET    Take 2 tablets (1,000 mg total) by mouth 2 (two) times daily.    OMEPRAZOLE (PRILOSEC) 20 MG CAPSULE    Take 1 capsule (20 mg total) by mouth every morning.    OXYCODONE-ACETAMINOPHEN (PERCOCET)  MG PER TABLET    Take 1 tablet by mouth every 6 (six) hours as needed for Pain.    PEN NEEDLE, DIABETIC 33 GAUGE X 5/32" NDLE    1 application by Misc.(Non-Drug; Combo Route) route 4 (four) times daily with meals and nightly.    TIZANIDINE (ZANAFLEX) 2 MG TABLET    Take 4 mg by mouth every 6 (six) hours as needed.    VITAMIN D 185 MG TAB    Take 5,000 mg by mouth once daily.   Discontinued Medications    ATORVASTATIN (LIPITOR) 20 MG TABLET    TAKE 1 TABLET (20 MG TOTAL) BY MOUTH ONCE DAILY.    ECONAZOLE NITRATE 1 % CREAM    Apply topically 2 (two) times daily.    FLUCONAZOLE (DIFLUCAN) 150 MG TAB        LISINOPRIL " "(PRINIVIL,ZESTRIL) 2.5 MG TABLET    ONE DAILY FOR PROTEINURIA.    OMEPRAZOLE (PRILOSEC) 20 MG CAPSULE    TAKE 1 CAPSULE (20 MG TOTAL) BY MOUTH EVERY MORNING.         REVIEW OF SYSTEMS:    GENERAL:  Patient is actively losing weight.  RESPIRATORY:  Negative for cough, wheezing or shortness of breath, patient denies any recent URI.  CARDIOVASCULAR:  Negative for chest pain, leg swelling or palpitations.  GI:  Negative for abdominal discomfort, blood in stools or black stools or change in bowel habits, occasional constipation.  MUSCULOSKELETAL:  See HPI.  SKIN:  Negative for lesions, rash, and itching.  PSYCH:  No mood disorder or recent psychosocial stressors.  Patient's sleep is disturbed secondary to pain (patient reports also that she has insomnia).  HEMATOLOGY/LYMPHOLOGY:  Negative for prolonged bleeding, bruising easily or swollen nodes.  81 mg aspirin  ENDO: Patient has a history of diabetes  NEURO:   No history of headaches, syncope, paralysis, seizures or tremors.  All other reviewed and negative other than HPI.    OBJECTIVE:    BP (!) 137/94   Pulse 78   Temp 98.5 °F (36.9 °C) (Oral)   Resp 20   Ht 5' 5" (1.651 m)   Wt (!) 165 kg (363 lb 12.1 oz)   SpO2 98%   BMI 60.53 kg/m²     PHYSICAL EXAMINATION:    GENERAL: Well appearing, in no acute distress, alert and oriented x3.   PSYCH:  Mood and affect appropriate.  SKIN: Skin color, texture, turgor normal, no rashes or lesions.  HEAD/FACE:  Normocephalic, atraumatic.   CV: RRR with palpation of the radial artery.  PULM: No evidence of respiratory difficulty, symmetric chest rise.  BACK: There is pain to palpation over the lumbar facet joints. There is pain to palpation over lumbar paraspinals. There is pain with palpation over thoracic paraspinals and rhomboids. Limited ROM with pain on extension.  Positive bilateral facet loading. Negative SLR bilaterally.  Pain with palpation to bilateral SI joints.  JENNA is negative bilaterally.  EXTREMITIES:  " Well-healed midline scars to bilateral knees.  Painful extension and flexion of bilateral knees. Medial joint line tenderness to bilateral knees, left greater than right.     MUSCULOSKELETAL: Bilateral upper and lower extremity strength is normal and symmetric.  No atrophy or tone abnormalities are noted.  NEURO: Bilateral lower extremity coordination and muscle stretch reflexes are physiologic and symmetric.  Plantar response are downgoing. No clonus.  No loss of sensation is noted.  GAIT: Antalgic- ambulating without assistance.       Assessment:       Encounter Diagnoses   Name Primary?    Spondylosis of lumbar region without myelopathy or radiculopathy Yes    Facet arthritis of lumbar region     DDD (degenerative disc disease), lumbar     Primary osteoarthritis of both knees     Bilateral chronic knee pain     Status post total right knee replacement     Chronic pain disorder     Encounter for long-term opiate analgesic use          Plan:       - Previous imaging was reviewed and discussed with the patient today. Previous labs reviewed today.     - Continue to f/u with bariatrics for possible gastric sleeve surgery.      - The patient will continue a home exercise routine to help with pain and strengthening. I encouraged her to follow her diet and increase her activity.     - Continue oxycodone-acetaminophen 10/325 one tablet every 6 hours PRN pain, #120, 0 refills.     - The patient is here today for a refill of current pain medications and they believe these provide effective pain control and improvements in quality of life.  The patient notes no serious side effects, and feels the benefits outweigh the risks.  The patient was reminded of the pain contract that they signed previously as well as the risks and benefits of the medication including possible death.  The updated [place] [employer] prescription monitoring program was reviewed, and the patient has been found to be compliant with  current treatment plan. Medication management provided by Dr. Wagner.     - UDS from 2/6/2018 reviewed and consistent.      - Continue topical pain cream PRN.    - Continue Zanaflex as needed.     - RTC in 1 month or sooner if needed.     - Dr. Wagner was consulted on the patient and agrees with this plan.    The above plan and management options were discussed at length with patient. Patient is in agreement with the above and verbalized understanding.     Adeola Robledo NP  03/06/2018

## 2018-03-19 ENCOUNTER — OFFICE VISIT (OUTPATIENT)
Dept: INTERNAL MEDICINE | Facility: CLINIC | Age: 53
End: 2018-03-19
Payer: MEDICARE

## 2018-03-19 ENCOUNTER — LAB VISIT (OUTPATIENT)
Dept: LAB | Facility: HOSPITAL | Age: 53
End: 2018-03-19
Attending: INTERNAL MEDICINE
Payer: MEDICARE

## 2018-03-19 VITALS
WEIGHT: 293 LBS | BODY MASS INDEX: 48.82 KG/M2 | DIASTOLIC BLOOD PRESSURE: 81 MMHG | SYSTOLIC BLOOD PRESSURE: 132 MMHG | TEMPERATURE: 99 F | HEIGHT: 65 IN

## 2018-03-19 DIAGNOSIS — J45.20 MILD INTERMITTENT ASTHMA WITHOUT COMPLICATION: Primary | ICD-10-CM

## 2018-03-19 DIAGNOSIS — Z20.2 STD EXPOSURE: ICD-10-CM

## 2018-03-19 DIAGNOSIS — E78.5 HYPERLIPIDEMIA, UNSPECIFIED HYPERLIPIDEMIA TYPE: ICD-10-CM

## 2018-03-19 DIAGNOSIS — R74.01 NONSPECIFIC ELEVATION OF LEVELS OF TRANSAMINASE AND LACTIC ACID DEHYDROGENASE (LDH): ICD-10-CM

## 2018-03-19 DIAGNOSIS — Z11.4 ENCOUNTER FOR SCREENING FOR HIV: ICD-10-CM

## 2018-03-19 DIAGNOSIS — R74.02 NONSPECIFIC ELEVATION OF LEVELS OF TRANSAMINASE AND LACTIC ACID DEHYDROGENASE (LDH): ICD-10-CM

## 2018-03-19 DIAGNOSIS — E55.9 VITAMIN D DEFICIENCY: ICD-10-CM

## 2018-03-19 LAB
ESTIMATED AVG GLUCOSE: 171 MG/DL
HBA1C MFR BLD HPLC: 7.6 %

## 2018-03-19 PROCEDURE — 99215 OFFICE O/P EST HI 40 MIN: CPT | Mod: PBBFAC,PO | Performed by: INTERNAL MEDICINE

## 2018-03-19 PROCEDURE — 99214 OFFICE O/P EST MOD 30 MIN: CPT | Mod: S$PBB,,, | Performed by: INTERNAL MEDICINE

## 2018-03-19 PROCEDURE — 99999 PR PBB SHADOW E&M-EST. PATIENT-LVL V: CPT | Mod: PBBFAC,,, | Performed by: INTERNAL MEDICINE

## 2018-03-19 PROCEDURE — 83036 HEMOGLOBIN GLYCOSYLATED A1C: CPT

## 2018-03-19 PROCEDURE — 36415 COLL VENOUS BLD VENIPUNCTURE: CPT | Mod: PO

## 2018-03-19 RX ORDER — ERGOCALCIFEROL 1.25 MG/1
CAPSULE ORAL
Qty: 8 CAPSULE | Refills: 0 | Status: SHIPPED | OUTPATIENT
Start: 2018-03-19 | End: 2018-11-26

## 2018-03-19 RX ORDER — BUPROPION HYDROCHLORIDE 150 MG/1
TABLET, EXTENDED RELEASE ORAL
Qty: 60 TABLET | Refills: 3 | Status: SHIPPED | OUTPATIENT
Start: 2018-03-19 | End: 2018-06-28

## 2018-03-19 NOTE — PROGRESS NOTES
Subjective:       Patient ID: Alivia Thomas is a 52 y.o. female.    Chief Complaint: Follow-up    HPIPt feeling well but has gained weight -does not use CPAP as she cares for her father at night and wants to be sure to hear him if he needs something.  Neck pain and radiculopathy being evaluated by doctors from [facility] 11/2017.  Wants STD check.  Review of Systems   Respiratory: Negative for shortness of breath (PND or orthopnea).    Cardiovascular: Negative for chest pain (arm pain or jaw pain).   Gastrointestinal: Negative for abdominal pain, diarrhea, nausea and vomiting.   Genitourinary: Negative for dysuria.   Neurological: Negative for seizures, syncope and headaches.       Objective:      Physical Exam   Constitutional: She is oriented to person, place, and time. She appears well-developed and well-nourished. No distress.   HENT:   Head: Normocephalic.   Mouth/Throat: Oropharynx is clear and moist.   Neck: Neck supple. No JVD present. No thyromegaly present.   Cardiovascular: Normal rate, regular rhythm, normal heart sounds and intact distal pulses.  Exam reveals no gallop and no friction rub.    No murmur heard.  Pulmonary/Chest: Effort normal and breath sounds normal. She has no wheezes. She has no rales.   Abdominal: Soft. Bowel sounds are normal. She exhibits no distension and no mass. There is no tenderness. There is no rebound and no guarding.   Musculoskeletal: She exhibits no edema.   Lymphadenopathy:     She has no cervical adenopathy.   Neurological: She is alert and oriented to person, place, and time. She has normal reflexes.   Skin: Skin is warm and dry.   Psychiatric: She has a normal mood and affect. Her behavior is normal. Judgment and thought content normal.       Assessment:       1. Mild intermittent asthma without complication    2. Hyperlipidemia, unspecified hyperlipidemia type    3. Uncontrolled type 2 diabetes mellitus with microalbuminuria, with long-term current use of insulin    4.  Vitamin D deficiency    5. STD exposure    6. Encounter for screening for HIV     7. Nonspecific elevation of levels of transaminase and lactic acid dehydrogenase (LDH)         Plan:   Mild intermittent asthma without complication  Controlled - continue current meds    Hyperlipidemia, unspecified hyperlipidemia type  -     Comprehensive metabolic panel; Future; Expected date: 03/19/2018  -     Lipid panel; Future; Expected date: 03/19/2018    Uncontrolled type 2 diabetes mellitus with microalbuminuria, with long-term current use of insulin  -     Hemoglobin A1c; Future; Expected date: 03/19/2018  -     TSH; Future; Expected date: 03/19/2018  -     Hemoglobin A1c; Future; Expected date: 03/19/2018    Vitamin D deficiency  -     Vitamin D; Future; Expected date: 03/19/2018    STD exposure  -     HIV-1 and HIV-2 antibodies; Future; Expected date: 03/19/2018  -     RPR; Future; Expected date: 03/19/2018  -     Hepatitis panel, acute; Future; Expected date: 03/19/2018    Encounter for screening for HIV   -     HIV-1 and HIV-2 antibodies; Future; Expected date: 03/19/2018    Nonspecific elevation of levels of transaminase and lactic acid dehydrogenase (LDH)   -     Hepatitis panel, acute; Future; Expected date: 03/19/2018    Other orders  -     buPROPion (WELLBUTRIN SR) 150 MG TBSR 12 hr tablet; One daily for three days then one twice daily  Dispense: 60 tablet; Refill: 3 - wean fluoxetine try for weight loss topamax unsuccessful  -     ergocalciferol (ERGOCALCIFEROL) 50,000 unit Cap; One weekly for 8 weeks then 2,000 IU daily OTC  Dispense: 8 capsule; Refill: 0

## 2018-03-28 ENCOUNTER — TELEPHONE (OUTPATIENT)
Dept: ORTHOPEDICS | Facility: CLINIC | Age: 53
End: 2018-03-28

## 2018-03-28 ENCOUNTER — OFFICE VISIT (OUTPATIENT)
Dept: ORTHOPEDICS | Facility: CLINIC | Age: 53
End: 2018-03-28
Payer: MEDICARE

## 2018-03-28 VITALS — BODY MASS INDEX: 48.82 KG/M2 | WEIGHT: 293 LBS | HEIGHT: 65 IN

## 2018-03-28 DIAGNOSIS — Z96.651 S/P REVISION OF TOTAL KNEE, RIGHT: ICD-10-CM

## 2018-03-28 DIAGNOSIS — G89.29 CHRONIC PAIN OF BOTH KNEES: Primary | ICD-10-CM

## 2018-03-28 DIAGNOSIS — E66.01 MORBID OBESITY WITH BMI OF 50.0-59.9, ADULT: ICD-10-CM

## 2018-03-28 DIAGNOSIS — M25.562 CHRONIC PAIN OF BOTH KNEES: Primary | ICD-10-CM

## 2018-03-28 DIAGNOSIS — M25.561 CHRONIC PAIN OF BOTH KNEES: Primary | ICD-10-CM

## 2018-03-28 DIAGNOSIS — Z96.652 STATUS POST TOTAL LEFT KNEE REPLACEMENT: ICD-10-CM

## 2018-03-28 PROCEDURE — 99213 OFFICE O/P EST LOW 20 MIN: CPT | Mod: S$PBB,,, | Performed by: ORTHOPAEDIC SURGERY

## 2018-03-28 PROCEDURE — 99999 PR PBB SHADOW E&M-EST. PATIENT-LVL III: CPT | Mod: PBBFAC,,, | Performed by: ORTHOPAEDIC SURGERY

## 2018-03-28 PROCEDURE — 99213 OFFICE O/P EST LOW 20 MIN: CPT | Mod: PBBFAC | Performed by: ORTHOPAEDIC SURGERY

## 2018-03-28 NOTE — PROGRESS NOTES
"Subjective:      Patient ID: Alivia Thomas is a 52 y.o. female.    Chief Complaint: Pain of the Left Knee and Pain of the Right Knee    HPI  Alivia Thomas has bilateral knee pain.  The pain has slightly improved with the Voltaren gel. The pain is located in the anterior aspect of the knee.  There  is not radiation.   There is associated stiffness.   There is not catching and locking. The pain is described as achy. The pain is aggravated by activity.  It is alleviated by rest.  There is associated back pain.  Her history, medications and problem list were reviewed.    Review of Systems   Constitution: Negative for chills, fever and night sweats.   HENT: Negative for hearing loss.    Eyes: Negative for blurred vision and double vision.   Cardiovascular: Negative for chest pain, claudication and leg swelling.   Respiratory: Negative for shortness of breath.    Endocrine: Negative for polydipsia, polyphagia and polyuria.   Hematologic/Lymphatic: Negative for adenopathy and bleeding problem. Does not bruise/bleed easily.   Skin: Negative for poor wound healing.   Musculoskeletal: Positive for joint pain.   Gastrointestinal: Negative for diarrhea and heartburn.   Genitourinary: Negative for bladder incontinence.   Neurological: Negative for focal weakness, headaches, numbness, paresthesias and sensory change.   Psychiatric/Behavioral: The patient is not nervous/anxious.    Allergic/Immunologic: Negative for persistent infections.         Objective:      Body mass index is 59.8 kg/m².  Vitals:    03/28/18 1544   Weight: (!) 163 kg (359 lb 5.6 oz)   Height: 5' 5" (1.651 m)           General    Constitutional: She is oriented to person, place, and time.   obese   HENT:   Head: Normocephalic and atraumatic.   Eyes: EOM are normal.   Cardiovascular: Normal rate and regular rhythm.    Pulmonary/Chest: Effort normal.   Neurological: She is alert and oriented to person, place, and time.   Psychiatric: She has a normal " mood and affect.     General Musculoskeletal Exam   Gait: abnormal       Right Knee Exam     Inspection   Erythema: absent  Scars: present  Swelling: absent  Effusion: effusion  Deformity: deformity  Bruising: absent    Tenderness   The patient is tender to palpation of the patellar tendon and patella.    Range of Motion   Extension: 5   Flexion: 120     Tests   Ligament Examination Lachman: normal (-1 to 2mm)   MCL - Valgus: normal (0 to 2mm)  LCL - Varus: normal  Patella   Passive Patellar Tilt: neutral    Other   Sensation: normal    Left Knee Exam     Inspection   Erythema: absent  Scars: present  Swelling: absent  Effusion: absent  Deformity: deformity  Bruising: absent    Tenderness   The patient tender to palpation of the patella and patellar tendon.    Range of Motion   Extension: 0   Flexion: 120     Tests   Stability Lachman: normal (-1 to 2mm)   MCL - Valgus: normal (0 to 2mm)  LCL - Varus: normal (0 to 2mm)  Patella   Passive Patellar Tilt: neutral    Other   Sensation: normal    Muscle Strength   Right Lower Extremity   Hip Abduction: 5/5   Quadriceps:  5/5   Hamstrin/5   Left Lower Extremity   Hip Abduction: 5/5   Quadriceps:  5/5   Hamstrin/5     Reflexes     Left Side  Quadriceps:  2+    Right Side   Quadriceps:  2+    Vascular Exam     Right Pulses  Dorsalis Pedis:      2+          Left Pulses  Dorsalis Pedis:      2+          Edema  Right Lower Leg: absent  Left Lower Leg: absent              Assessment:       Encounter Diagnoses   Name Primary?    Chronic pain of both knees Yes    S/P revision of total knee, right     Status post total left knee replacement     Morbid obesity with BMI of 50.0-59.9, adult           Plan:       Alivia was seen today for pain and pain.    Diagnoses and all orders for this visit:    Chronic pain of both knees    S/P revision of total knee, right    Status post total left knee replacement    Morbid obesity with BMI of 50.0-59.9, adult      Continue  voltaren gel.  F/U in 6 months with xrays.  Handicap parking pass

## 2018-03-28 NOTE — TELEPHONE ENCOUNTER
----- Message from Sweta Devlin sent at 3/28/2018  1:38 PM CDT -----  Contact: evangelista  Pt called requesting a call back from Brynn regarding her appt today for 2:45 Please call today at  [phone]

## 2018-04-06 ENCOUNTER — OFFICE VISIT (OUTPATIENT)
Dept: PAIN MEDICINE | Facility: CLINIC | Age: 53
End: 2018-04-06
Payer: MEDICARE

## 2018-04-06 VITALS
BODY MASS INDEX: 48.82 KG/M2 | WEIGHT: 293 LBS | SYSTOLIC BLOOD PRESSURE: 131 MMHG | TEMPERATURE: 97 F | HEIGHT: 65 IN | HEART RATE: 93 BPM | DIASTOLIC BLOOD PRESSURE: 82 MMHG

## 2018-04-06 DIAGNOSIS — M79.10 MYALGIA: ICD-10-CM

## 2018-04-06 DIAGNOSIS — M25.562 CHRONIC PAIN OF BOTH KNEES: ICD-10-CM

## 2018-04-06 DIAGNOSIS — G89.4 CHRONIC PAIN DISORDER: ICD-10-CM

## 2018-04-06 DIAGNOSIS — M25.561 BILATERAL CHRONIC KNEE PAIN: ICD-10-CM

## 2018-04-06 DIAGNOSIS — M17.0 PRIMARY OSTEOARTHRITIS OF BOTH KNEES: ICD-10-CM

## 2018-04-06 DIAGNOSIS — G89.29 BILATERAL CHRONIC KNEE PAIN: ICD-10-CM

## 2018-04-06 DIAGNOSIS — G89.29 CHRONIC PAIN OF BOTH KNEES: ICD-10-CM

## 2018-04-06 DIAGNOSIS — M25.562 BILATERAL CHRONIC KNEE PAIN: ICD-10-CM

## 2018-04-06 DIAGNOSIS — M47.816 SPONDYLOSIS OF LUMBAR REGION WITHOUT MYELOPATHY OR RADICULOPATHY: Primary | ICD-10-CM

## 2018-04-06 DIAGNOSIS — Z96.651 STATUS POST TOTAL RIGHT KNEE REPLACEMENT: ICD-10-CM

## 2018-04-06 DIAGNOSIS — Z79.891 ENCOUNTER FOR LONG-TERM OPIATE ANALGESIC USE: ICD-10-CM

## 2018-04-06 DIAGNOSIS — M25.561 CHRONIC PAIN OF BOTH KNEES: ICD-10-CM

## 2018-04-06 DIAGNOSIS — M47.816 FACET ARTHRITIS OF LUMBAR REGION: ICD-10-CM

## 2018-04-06 DIAGNOSIS — M51.36 DDD (DEGENERATIVE DISC DISEASE), LUMBAR: ICD-10-CM

## 2018-04-06 PROCEDURE — 99999 PR PBB SHADOW E&M-EST. PATIENT-LVL III: CPT | Mod: PBBFAC,,, | Performed by: NURSE PRACTITIONER

## 2018-04-06 PROCEDURE — 99213 OFFICE O/P EST LOW 20 MIN: CPT | Mod: PBBFAC | Performed by: NURSE PRACTITIONER

## 2018-04-06 PROCEDURE — 99214 OFFICE O/P EST MOD 30 MIN: CPT | Mod: S$PBB,,, | Performed by: NURSE PRACTITIONER

## 2018-04-06 RX ORDER — INSULIN LISPRO 100 [IU]/ML
INJECTION, SOLUTION INTRAVENOUS; SUBCUTANEOUS
Qty: 1 BOX | Refills: 6 | Status: SHIPPED | OUTPATIENT
Start: 2018-04-06 | End: 2018-12-09 | Stop reason: SDUPTHER

## 2018-04-06 RX ORDER — OXYCODONE AND ACETAMINOPHEN 10; 325 MG/1; MG/1
1 TABLET ORAL EVERY 6 HOURS PRN
Qty: 120 TABLET | Refills: 0 | Status: SHIPPED | OUTPATIENT
Start: 2018-04-06 | End: 2018-05-02 | Stop reason: SDUPTHER

## 2018-04-06 RX ORDER — CELECOXIB 200 MG/1
CAPSULE ORAL
Qty: 30 CAPSULE | Refills: 1 | Status: SHIPPED | OUTPATIENT
Start: 2018-04-06 | End: 2018-06-04 | Stop reason: SDUPTHER

## 2018-04-06 NOTE — TELEPHONE ENCOUNTER
----- Message from Michael Hernandez sent at 4/6/2018 10:44 AM CDT -----  Contact: Patient [phone]  Is this a refill or new RX:  Refill    RX name and strength: celecoxib (CELEBREX) 200 MG capsule      Pharmacy name and phone #  [facility] - [address] [phone] (phone) [phone] (Fax)    Comments:  She said she is out of the medication and need it ASAP

## 2018-04-06 NOTE — PROGRESS NOTES
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 capsule (200 mg total) by mouth once daily.    DICLOFENAC SODIUM (VOLTAREN) 1 % GEL    Apply 2 g topically once daily.    ERGOCALCIFEROL (ERGOCALCIFEROL) 50,000 UNIT CAP    One weekly for 8 weeks then 2,000 IU daily OTC    FLUTICASONE (FLONASE) 50 MCG/ACTUATION NASAL SPRAY    1 spray by Each Nare route 2 (two) times daily as needed for Rhinitis.    INSULIN DETEMIR (LEVEMIR FLEXPEN) 100 UNIT/ML (3 ML) SUBQ INPN PEN    Inject 30 Units into the skin every evening.    INSULIN LISPRO (HUMALOG KWIKPEN) 100 UNIT/ML INPN PEN    11 Units before meals plus sliding scale    LISINOPRIL (PRINIVIL,ZESTRIL) 2.5 MG TABLET    One daily for proteinuria.    METFORMIN (GLUCOPHAGE-XR) 500 MG 24 HR TABLET    Take 2 tablets (1,000 mg total) by mouth 2 (two) times daily.    OMEPRAZOLE (PRILOSEC) 20 MG CAPSULE    Take 1 capsule (20 mg total) by mouth every morning.    PEN NEEDLE, DIABETIC 33 GAUGE X 5/32" NDLE    1 application by Misc.(Non-Drug; Combo Route) route 4 (four) times daily with meals and nightly.    TIZANIDINE (ZANAFLEX) 2 MG TABLET    Take 4 mg by mouth every 6 (six) hours as needed.    VITAMIN D 185 MG TAB    Take 5,000 mg by mouth once daily.         REVIEW OF SYSTEMS:    GENERAL:  Patient is actively losing weight.  RESPIRATORY:  Negative for cough, wheezing or shortness of breath, patient denies any recent " "URI.  CARDIOVASCULAR:  Negative for chest pain, leg swelling or palpitations.  GI:  Negative for abdominal discomfort, blood in stools or black stools or change in bowel habits, occasional constipation.  MUSCULOSKELETAL:  See HPI.  SKIN:  Negative for lesions, rash, and itching.  PSYCH:  No mood disorder or recent psychosocial stressors.  Patient's sleep is disturbed secondary to pain (patient reports also that she has insomnia).  HEMATOLOGY/LYMPHOLOGY:  Negative for prolonged bleeding, bruising easily or swollen nodes.  81 mg aspirin  ENDO: Patient has a history of diabetes  NEURO:   No history of headaches, syncope, paralysis, seizures or tremors.  All other reviewed and negative other than HPI.    OBJECTIVE:    /82   Pulse 93   Temp 97.4 °F (36.3 °C)   Ht 5' 5" (1.651 m)   Wt (!) 163.6 kg (360 lb 10.8 oz)   BMI 60.02 kg/m²     PHYSICAL EXAMINATION:    GENERAL: Well appearing, in no acute distress, alert and oriented x3.   PSYCH:  Mood and affect appropriate.  SKIN: Skin color, texture, turgor normal, no rashes or lesions.  HEAD/FACE:  Normocephalic, atraumatic.   CV: RRR with palpation of the radial artery.  PULM: No evidence of respiratory difficulty, symmetric chest rise.  BACK: There is pain to palpation over the lumbar facet joints. There is pain to palpation over lumbar paraspinals. There is pain with palpation over thoracic paraspinals and rhomboids. Limited ROM with pain on extension.  Positive bilateral facet loading. Negative SLR bilaterally.    EXTREMITIES: Pain with palpation to bilateral SI joints.  JENNA is negative bilaterally. Well-healed midline scars to bilateral knees.  Painful extension and flexion of bilateral knees. Medial joint line tenderness to bilateral knees, left greater than right.    MUSCULOSKELETAL: Bilateral upper and lower extremity strength is normal and symmetric.  No atrophy or tone abnormalities are noted.  NEURO: Bilateral lower extremity coordination and muscle " stretch reflexes are physiologic and symmetric.  Plantar response are downgoing. No clonus.  No loss of sensation is noted.  GAIT: Antalgic- ambulating without assistance.       Assessment:       Encounter Diagnoses   Name Primary?    Spondylosis of lumbar region without myelopathy or radiculopathy Yes    Facet arthritis of lumbar region     DDD (degenerative disc disease), lumbar     Status post total right knee replacement     Primary osteoarthritis of both knees     Bilateral chronic knee pain     Myalgia     Chronic pain disorder     Encounter for long-term opiate analgesic use          Plan:       - Previous imaging was reviewed and discussed with the patient today. Previous labs reviewed today.     - Continue to f/u with bariatrics for possible gastric sleeve surgery.      - The patient will continue a home exercise routine to help with pain and strengthening. I encouraged her to follow her diet and increase her activity.     - Continue oxycodone-acetaminophen 10/325 one tablet every 6 hours PRN pain, #120, 0 refills. Refill provided today.     - The patient is here today for a refill of current pain medications and they believe these provide effective pain control and improvements in quality of life.  The patient notes no serious side effects, and feels the benefits outweigh the risks.  The patient was reminded of the pain contract that they signed previously as well as the risks and benefits of the medication including possible death.  The updated [place] [employer] prescription monitoring program was reviewed, and the patient has been found to be compliant with current treatment plan. Medication management provided by Dr. Wagner.     - UDS from 2/6/2018 reviewed and consistent.      - Continue topical pain cream PRN.    - Continue Zanaflex as needed.     - RTC in 1 month or sooner if needed.     - Dr. Wagner was consulted on the patient and agrees with this plan.    The above plan and  management options were discussed at length with patient. Patient is in agreement with the above and verbalized understanding.     Adeola Robledo NP  04/06/2018

## 2018-04-09 ENCOUNTER — TELEPHONE (OUTPATIENT)
Dept: INTERNAL MEDICINE | Facility: CLINIC | Age: 53
End: 2018-04-09

## 2018-04-11 ENCOUNTER — OFFICE VISIT (OUTPATIENT)
Dept: PODIATRY | Facility: CLINIC | Age: 53
End: 2018-04-11
Payer: MEDICARE

## 2018-04-11 VITALS
BODY MASS INDEX: 47.09 KG/M2 | DIASTOLIC BLOOD PRESSURE: 86 MMHG | WEIGHT: 293 LBS | HEART RATE: 92 BPM | HEIGHT: 66 IN | SYSTOLIC BLOOD PRESSURE: 127 MMHG

## 2018-04-11 DIAGNOSIS — E11.49 TYPE II DIABETES MELLITUS WITH NEUROLOGICAL MANIFESTATIONS: Primary | ICD-10-CM

## 2018-04-11 DIAGNOSIS — B35.1 DERMATOPHYTOSIS OF NAIL: ICD-10-CM

## 2018-04-11 DIAGNOSIS — L84 CORNS AND CALLUS: ICD-10-CM

## 2018-04-11 PROCEDURE — 99213 OFFICE O/P EST LOW 20 MIN: CPT | Mod: PBBFAC | Performed by: PODIATRIST

## 2018-04-11 PROCEDURE — 11056 PARNG/CUTG B9 HYPRKR LES 2-4: CPT | Mod: Q9,S$PBB,, | Performed by: PODIATRIST

## 2018-04-11 PROCEDURE — 99499 UNLISTED E&M SERVICE: CPT | Mod: S$PBB,,, | Performed by: PODIATRIST

## 2018-04-11 PROCEDURE — 11721 DEBRIDE NAIL 6 OR MORE: CPT | Mod: Q9,PBBFAC | Performed by: PODIATRIST

## 2018-04-11 PROCEDURE — 99999 PR PBB SHADOW E&M-EST. PATIENT-LVL III: CPT | Mod: PBBFAC,,, | Performed by: PODIATRIST

## 2018-04-11 NOTE — PATIENT INSTRUCTIONS
How to Check Your Feet    Below are tips to help you look for foot problems. Try to check your feet at the same time each day, such as when you get out of bed in the morning.    · Check the top of each foot. The tops of toes, back of the heel, and outer edge of the foot can get a lot of rubbing from poor-fitting shoes.    · Check the bottom of each foot. Daily wear and tear often leads to problems at pressure spots.    · Check the toes and nails. Fungal infections often occur between toes. Toenail problems can also be a sign of fungal infections or lead to breaks in the skin.    · Check your shoes, too. Loose objects inside a shoe can injure the foot. Use your hand to feel inside your shoes for things like alie, loose stitching, or rough areas that could irritate your skin.        Diabetic Foot Care    Diabetes can lead to a number of different foot complications. Fortunately, most of these complications can be prevented with a little extra foot care. If diabetes is not well controlled, the high blood sugar can cause damage to blood vessels and result in poor circulation to the foot. When the skin does not get enough blood flow, it becomes prone to pressure sores and ulcers, which heal slowly.  High blood sugar can also damage nerves, interfering with the ability to feel pain and pressure. When you cant feel your foot normally, it is easy to injure your skin, bones and joints without knowing it. For these reasons diabetes increases the risk of fungal infections, bunions and ulcers. Deep ulcers can lead to bone infection. Gangrene is the most serious foot complication of diabetes. It usually occurs on the tips of the toes as blacked areas of skin. The black area is dead tissue. In severe cases, gangrene spreads to involve the entire toe, other toes and the entire foot. Foot or toe amputation may be required. Good foot care and blood sugar control can prevent this.    Home Care  1. Wear comfortable, proper fitting  shoes.  2. Wash your feet daily with warm water and mild soap.  3. After drying, apply a moisturizing cream or lotion.  4. Check your feet daily for skin breaks, blisters, swelling, or redness. Look between your toes also.  5. Wear cotton socks and change them every day.  6. Trim toe nails carefully and do not cut your cuticles.  7. Strive to keep your blood sugar under control with a combination of medicines, diet and activity.  8. If you smoke and have diabetes, it is very important that you stop. Smoking reduces blood flow to your foot.  9. Avoid activities that increase your risk of foot injury:  · Do not walk barefoot.  · Do not use heating pads or hot water bottles on your feet.  · Do not put your foot in a hot tub without first checking the temperature with your hand.  10) Schedule yearly foot exams.    Follow Up  with your doctor or as advised by our staff. Report any cut, puncture, scrape, other injury, blister, ingrown toenail or ulcer on your foot.    Get Prompt Medical Attention  if any of the following occur:  -- Open ulcer with pus draining from the wound  -- Increasing foot or leg pain  -- New areas of redness or swelling or tender areas of the foot    © 1480-1335 [employer]. [address] [place] [address], [place] [address]. All rights reserved. This information is not intended as a substitute for professional medical care. Always follow your healthcare professional's instructions.

## 2018-04-11 NOTE — PROGRESS NOTES
Chief Complaint   Patient presents with    Diabetes Mellitus     dr. richardson 03/19/2018    Diabetic Foot Exam    Follow-up           HPI:   The patient is a 52 y.o.  female  who presents for a diabetic foot exam.   Patient reports + presence of abnormal sensation to the feet, just sometimes.     Patient has no history of wounds on the feet.   Hx of foot surgery: none.   Shoe gear: flat lace up canvas   This patient has documented high risk feet requiring routine maintenance secondary to diabetes.  Main concern today is toenail debridement.        Primary care doctor is: Clarisse Richardson MD  Patient last saw primary care doctor on:     Chief Complaint   Patient presents with    Diabetes Mellitus     dr. richardson 03/19/2018    Diabetic Foot Exam    Follow-up              Past Medical History:   Diagnosis Date    Asthma     Diabetes mellitus type II     DJD (degenerative joint disease) of knee 6/19/2014    Hyperlipidemia     Morbid obesity     Sleep apnea          Current Outpatient Prescriptions on File Prior to Visit   Medication Sig Dispense Refill    albuterol 90 mcg/actuation inhaler Take 2 puffs every 4-6 hours  as needed for shortness of breath/wheezing 1 Inhaler 6    aspirin (ECOTRIN) 81 MG EC tablet Take 1 tablet by mouth every morning. prevents heart attacks and strokes      atorvastatin (LIPITOR) 20 MG tablet Take 1 tablet (20 mg total) by mouth once daily. 30 tablet 6    blood sugar diagnostic Strp Freestyle light strips and lancets 100 each 6    blood sugar diagnostic Strp Check glucose four times daily Strips and lancets covered by insurance E11.9 - One touch 120 each 6    blood-glucose meter (FREESTYLE SYSTEM KIT) kit Use as instructed 1 each 0    blood-glucose meter kit Check glucose four times daily E11.9 meter covered by insurance One Touch 1 each 0    buPROPion (WELLBUTRIN SR) 150 MG TBSR 12 hr tablet One daily for three days then one twice daily 60 tablet 3    celecoxib  "(CELEBREX) 200 MG capsule TAKE 1 CAPSULE BY MOUTH DAILY 30 capsule 1    diclofenac sodium (VOLTAREN) 1 % Gel Apply 2 g topically once daily. 1 Tube 3    ergocalciferol (ERGOCALCIFEROL) 50,000 unit Cap One weekly for 8 weeks then 2,000 IU daily OTC 8 capsule 0    fluticasone (FLONASE) 50 mcg/actuation nasal spray 1 spray by Each Nare route 2 (two) times daily as needed for Rhinitis. 15 g 0    insulin detemir (LEVEMIR FLEXPEN) 100 unit/mL (3 mL) SubQ InPn pen Inject 30 Units into the skin every evening. 1 Box 6    insulin lispro (HUMALOG KWIKPEN) 100 unit/mL InPn pen 11 Units before meals plus sliding scale 1 Box 6    lisinopril (PRINIVIL,ZESTRIL) 2.5 MG tablet One daily for proteinuria. 30 tablet 6    metFORMIN (GLUCOPHAGE-XR) 500 MG 24 hr tablet Take 2 tablets (1,000 mg total) by mouth 2 (two) times daily. 360 tablet 6    omeprazole (PRILOSEC) 20 MG capsule Take 1 capsule (20 mg total) by mouth every morning. 30 capsule 6    oxyCODONE-acetaminophen (PERCOCET)  mg per tablet Take 1 tablet by mouth every 6 (six) hours as needed for Pain. 120 tablet 0    pen needle, diabetic 33 gauge x 5/32" Ndle 1 application by Misc.(Non-Drug; Combo Route) route 4 (four) times daily with meals and nightly. 100 each 6    tiZANidine (ZANAFLEX) 2 MG tablet Take 4 mg by mouth every 6 (six) hours as needed.      vitamin D 185 MG Tab Take 5,000 mg by mouth once daily.       No current facility-administered medications on file prior to visit.        Review of patient's allergies indicates:  No Known Allergies          Social History     Social History    Marital status: Single     Spouse name: N/A    Number of children: N/A    Years of education: N/A     Occupational History    Not on file.     Social History Main Topics    Smoking status: Never Smoker    Smokeless tobacco: Never Used    Alcohol use Yes      Comment: occasionally     Drug use: No    Sexual activity: Yes     Partners: Female     Birth control/ " protection: None     Other Topics Concern    Not on file     Social History Narrative    Disabled. The patient is the youngest of 6 siblings. Single. Lives with single-sex partner.                            ROS:  General ROS: negative  Respiratory ROS: no cough, shortness of breath, or wheezing  Cardiovascular ROS: no chest pain or dyspnea on exertion  Musculoskeletal ROS: negative  Neurological ROS:   negative for - gait disturbance, numbness/tingling, seizures or tremors  Dermatological ROS: negative          LAST HbA1c:   Hemoglobin A1C   Date Value Ref Range Status   03/19/2018 7.6 (H) 4.0 - 5.6 % Final     Comment:     According to ADA guidelines, hemoglobin A1c <7.0% represents  optimal control in non-pregnant diabetic patients. Different  metrics may apply to specific patient populations.   Standards of Medical Care in Diabetes-2016.  For the purpose of screening for the presence of diabetes:  <5.7%     Consistent with the absence of diabetes  5.7-6.4%  Consistent with increasing risk for diabetes   (prediabetes)  >or=6.5%  Consistent with diabetes  Currently, no consensus exists for use of hemoglobin A1c  for diagnosis of diabetes for children.  This Hemoglobin A1c assay has significant interference with fetal   hemoglobin   (HbF). The results are invalid for patients with abnormal amounts of   HbF,   including those with known Hereditary Persistence   of Fetal Hemoglobin. Heterozygous hemoglobin variants (HbAS, HbAC,   HbAD, HbAE, HbA2) do not significantly interfere with this assay;   however, presence of multiple variants in a sample may impact the %   interference.     12/18/2017 7.4 (H) 4.0 - 5.6 % Final     Comment:     According to ADA guidelines, hemoglobin A1c <7.0% represents  optimal control in non-pregnant diabetic patients. Different  metrics may apply to specific patient populations.   Standards of Medical Care in Diabetes-2016.  For the purpose of screening for the presence of  "diabetes:  <5.7%     Consistent with the absence of diabetes  5.7-6.4%  Consistent with increasing risk for diabetes   (prediabetes)  >or=6.5%  Consistent with diabetes  Currently, no consensus exists for use of hemoglobin A1c  for diagnosis of diabetes for children.  This Hemoglobin A1c assay has significant interference with fetal   hemoglobin   (HbF). The results are invalid for patients with abnormal amounts of   HbF,   including those with known Hereditary Persistence   of Fetal Hemoglobin. Heterozygous hemoglobin variants (HbAS, HbAC,   HbAD, HbAE, HbA2) do not significantly interfere with this assay;   however, presence of multiple variants in a sample may impact the %   interference.     08/07/2017 9.3 (H) 4.0 - 5.6 % Final     Comment:     According to ADA guidelines, hemoglobin A1c <7.0% represents  optimal control in non-pregnant diabetic patients. Different  metrics may apply to specific patient populations.   Standards of Medical Care in Diabetes-2016.  For the purpose of screening for the presence of diabetes:  <5.7%     Consistent with the absence of diabetes  5.7-6.4%  Consistent with increasing risk for diabetes   (prediabetes)  >or=6.5%  Consistent with diabetes  Currently, no consensus exists for use of hemoglobin A1c  for diagnosis of diabetes for children.  This Hemoglobin A1c assay has significant interference with fetal   hemoglobin   (HbF). The results are invalid for patients with abnormal amounts of   HbF,   including those with known Hereditary Persistence   of Fetal Hemoglobin. Heterozygous hemoglobin variants (HbAS, HbAC,   HbAD, HbAE, HbA2) do not significantly interfere with this assay;   however, presence of multiple variants in a sample may impact the %   interference.           EXAM:   Vitals:    04/11/18 0721   BP: 127/86   Pulse: 92   Weight: (!) 163.3 kg (360 lb)   Height: 5' 6" (1.676 m)       General: Pt. is well-developed, well-nourished, appears stated age, in no acute " distress, alert and oriented x 3.      Vascular: Dorsalis pedis and posterior tibial pulses are 2/4 bilaterally. 3 secs capillary refill time and toes are warm to touch.  There is reduced hair growth on the feet.  There is 1+ edema.  no varicosities.      Neurological:  Light touch, proprioception, and sharp/dull sensation are all intact bilaterally. Protective threshold with the [place]on-Lindsay monofilament is diminished bilaterally.     Dermatological:  The skin is intact, warm.  The toenails  1-5 L and 1-5 R  are greenish- yellow, long by 5-6mm and thick by 2-3mm with subungual debris  .  There is presence of hyperkeratotic lesions, bilateral 1st metatarsal head.  There are no open wounds. There is no ecchymoses.  no erythema noted.   Skin diffusely dry on the soles     Interdigital spaces are intact, no fissures    Skin atrophic, thin, dry and mildly hyperpigmented.     Musculoskeletal:  Muscle strength is 5/5 in all groups bilaterally.  Metatarsophalangeal, subtalar, and ankle range of motion are intact without crepitus.           Assessment / Plan:      Shoe inspection. Diabetic Foot Education. Patient reminded of the importance of good nutrition and blood sugar control to help prevent podiatric complications of diabetes. Patient instructed on proper foot hygiene. We discussed wearing proper shoe gear, daily foot inspections, never walking without protective shoe gear, never putting sharp instruments to feet, and routine diabetic foot exam and care every 3-6 months to prevent complications.      Type II diabetes mellitus with neurological manifestations    Dermatophytosis of nail    Corns and callus    Other orders  -     Foot Care         Routine Foot Care  Date/Time: 4/11/2018 8:01 AM  Performed by: JOSÉ ANTONIO WILLIS  Authorized by: JOSÉ ANTONIO WILLIS     Consent Done?:  Yes (Verbal)  Hyperkeratotic Skin Lesions?: Yes    Number of trimmed lesions:  2  Location(s):  Left 1st Metatarsal Head and Right 1st Metatarsal  Head    Nail Care Type:  Debride  Location(s): All  (Left 1st Toe, Left 3rd Toe, Left 2nd Toe, Left 4th Toe, Left 5th Toe, Right 1st Toe, Right 2nd Toe, Right 3rd Toe, Right 4th Toe and Right 5th Toe)  Patient tolerance:  Patient tolerated the procedure well with no immediate complications     With patient's permission, the toenails mentioned above were aggressively reduced and debrided using a nail nipper, removing all offending nail and debris. Utilizing a #15 scalpel, I trimmed the corns and calluses at the above mentioned location.      The patient will continue to monitor the areas daily, inspect the feet, wear protective shoe gear when ambulatory, and moisturizer to maintain skin integrity.         This patient has documented high risk feet requiring routine maintenance secondary to diabetes       follow up 3 months or sooner if concerned.

## 2018-04-12 ENCOUNTER — TELEPHONE (OUTPATIENT)
Dept: INTERNAL MEDICINE | Facility: CLINIC | Age: 53
End: 2018-04-12
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
## 2018-04-12 NOTE — TELEPHONE ENCOUNTER
Patients Dr. Teresa Rodriguez. [phone], is suggesting she get an MRI done as soon as possible. Please place orders.    Please Advise  Thank You

## 2018-04-16 ENCOUNTER — TELEPHONE (OUTPATIENT)
Dept: INTERNAL MEDICINE | Facility: CLINIC | Age: 53
End: 2018-04-16

## 2018-04-16 NOTE — TELEPHONE ENCOUNTER
Called patient to see if there was anything else needed to help process along.    Please Advise  Thank You

## 2018-04-16 NOTE — TELEPHONE ENCOUNTER
----- Message from Ronnie Viramontes sent at 4/16/2018 10:35 AM CDT -----  Contact: Patient [phone]  Patient calling stating she found out that she does not have to go through you her pcp for the MRI, stating she can just have the records faxed over from outside network.    Please call and advise  Thank you

## 2018-04-23 ENCOUNTER — HOSPITAL ENCOUNTER (OUTPATIENT)
Dept: RADIOLOGY | Facility: OTHER | Age: 53
Discharge: HOME | End: 2018-04-23
Attending: FAMILY MEDICINE
Payer: MEDICARE

## 2018-04-23 DIAGNOSIS — R20.2 PARESTHESIA: ICD-10-CM

## 2018-04-23 DIAGNOSIS — M79.621 PAIN OF RIGHT UPPER ARM: ICD-10-CM

## 2018-04-23 DIAGNOSIS — R20.2 PARESTHESIA: Primary | ICD-10-CM

## 2018-04-23 DIAGNOSIS — M54.2 NECK PAIN: ICD-10-CM

## 2018-04-23 DIAGNOSIS — R51.9 HEAD ACHE: ICD-10-CM

## 2018-04-24 ENCOUNTER — HOSPITAL ENCOUNTER (OUTPATIENT)
Dept: RADIOLOGY | Facility: HOSPITAL | Age: 53
Discharge: HOME | End: 2018-04-24
Attending: FAMILY MEDICINE
Payer: MEDICARE

## 2018-04-24 PROCEDURE — 72141 MRI NECK SPINE W/O DYE: CPT | Mod: 26,,, | Performed by: RADIOLOGY

## 2018-04-24 PROCEDURE — 72141 MRI NECK SPINE W/O DYE: CPT | Mod: TC

## 2018-05-02 ENCOUNTER — OFFICE VISIT (OUTPATIENT)
Dept: PAIN MEDICINE | Facility: CLINIC | Age: 53
End: 2018-05-02
Payer: MEDICARE

## 2018-05-02 VITALS
BODY MASS INDEX: 47.09 KG/M2 | SYSTOLIC BLOOD PRESSURE: 135 MMHG | WEIGHT: 293 LBS | DIASTOLIC BLOOD PRESSURE: 87 MMHG | TEMPERATURE: 98 F | HEART RATE: 91 BPM | HEIGHT: 66 IN

## 2018-05-02 DIAGNOSIS — M47.816 SPONDYLOSIS OF LUMBAR REGION WITHOUT MYELOPATHY OR RADICULOPATHY: ICD-10-CM

## 2018-05-02 DIAGNOSIS — G89.4 CHRONIC PAIN DISORDER: ICD-10-CM

## 2018-05-02 DIAGNOSIS — G89.29 BILATERAL CHRONIC KNEE PAIN: ICD-10-CM

## 2018-05-02 DIAGNOSIS — M17.0 PRIMARY OSTEOARTHRITIS OF BOTH KNEES: ICD-10-CM

## 2018-05-02 DIAGNOSIS — M25.562 BILATERAL CHRONIC KNEE PAIN: ICD-10-CM

## 2018-05-02 DIAGNOSIS — G89.4 CHRONIC PAIN DISORDER: Primary | ICD-10-CM

## 2018-05-02 DIAGNOSIS — M25.561 BILATERAL CHRONIC KNEE PAIN: ICD-10-CM

## 2018-05-02 DIAGNOSIS — Z96.651 STATUS POST TOTAL RIGHT KNEE REPLACEMENT: ICD-10-CM

## 2018-05-02 DIAGNOSIS — M47.816 FACET ARTHRITIS OF LUMBAR REGION: ICD-10-CM

## 2018-05-02 DIAGNOSIS — Z79.891 ENCOUNTER FOR LONG-TERM OPIATE ANALGESIC USE: ICD-10-CM

## 2018-05-02 DIAGNOSIS — M51.36 DDD (DEGENERATIVE DISC DISEASE), LUMBAR: ICD-10-CM

## 2018-05-02 DIAGNOSIS — M79.10 MYALGIA: ICD-10-CM

## 2018-05-02 PROCEDURE — 99214 OFFICE O/P EST MOD 30 MIN: CPT | Mod: S$PBB,,, | Performed by: NURSE PRACTITIONER

## 2018-05-02 PROCEDURE — 99999 PR PBB SHADOW E&M-EST. PATIENT-LVL III: CPT | Mod: PBBFAC,,, | Performed by: NURSE PRACTITIONER

## 2018-05-02 PROCEDURE — 99213 OFFICE O/P EST LOW 20 MIN: CPT | Mod: PBBFAC | Performed by: NURSE PRACTITIONER

## 2018-05-02 RX ORDER — OXYCODONE AND ACETAMINOPHEN 10; 325 MG/1; MG/1
1 TABLET ORAL EVERY 6 HOURS PRN
Qty: 120 TABLET | Refills: 0 | Status: SHIPPED | OUTPATIENT
Start: 2018-05-04 | End: 2018-06-01 | Stop reason: SDUPTHER

## 2018-05-02 NOTE — PROGRESS NOTES
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    ATORVASTATIN (LIPITOR) 20 MG TABLET    Take 1 tablet (20 mg total) by mouth once daily.    BLOOD SUGAR DIAGNOSTIC STRP    Freestyle light strips and lancets    BLOOD SUGAR DIAGNOSTIC STRP    Check glucose four times daily Strips and lancets covered by insurance E11.9 - One touch    BLOOD-GLUCOSE METER (FREESTYLE SYSTEM KIT) KIT    Use as instructed    BLOOD-GLUCOSE METER KIT    Check glucose four times daily E11.9 meter covered by insurance One Touch    BUPROPION (WELLBUTRIN SR) 150 MG TBSR 12 HR TABLET    One daily for three days then one twice daily    CELECOXIB (CELEBREX) 200 MG CAPSULE    TAKE 1 CAPSULE BY MOUTH DAILY    DICLOFENAC SODIUM (VOLTAREN) 1 % GEL    Apply 2 g topically once daily.    ERGOCALCIFEROL (ERGOCALCIFEROL) 50,000 UNIT CAP    One weekly for 8 weeks then 2,000 IU daily OTC    FLUTICASONE (FLONASE) 50 MCG/ACTUATION NASAL SPRAY    1 spray by Each Nare route 2 (two) times daily as needed for Rhinitis.    INSULIN DETEMIR (LEVEMIR FLEXPEN) 100 UNIT/ML (3 ML) SUBQ INPN PEN    Inject 30 Units into the skin every evening.    INSULIN LISPRO (HUMALOG KWIKPEN) 100 UNIT/ML INPN PEN    11 Units before meals plus sliding scale    LISINOPRIL (PRINIVIL,ZESTRIL) 2.5 MG TABLET    One daily for proteinuria.    METFORMIN (GLUCOPHAGE-XR) 500 MG 24 HR TABLET    Take 2 tablets (1,000 mg total) by mouth 2 (two) times  "daily.    OMEPRAZOLE (PRILOSEC) 20 MG CAPSULE    Take 1 capsule (20 mg total) by mouth every morning.    OXYCODONE-ACETAMINOPHEN (PERCOCET)  MG PER TABLET    Take 1 tablet by mouth every 6 (six) hours as needed for Pain.    PEN NEEDLE, DIABETIC 33 GAUGE X 5/32" NDLE    1 application by Misc.(Non-Drug; Combo Route) route 4 (four) times daily with meals and nightly.    TIZANIDINE (ZANAFLEX) 2 MG TABLET    Take 4 mg by mouth every 6 (six) hours as needed.    VITAMIN D 185 MG TAB    Take 5,000 mg by mouth once daily.         REVIEW OF SYSTEMS:    GENERAL:  Patient is actively losing weight.  RESPIRATORY:  Negative for cough, wheezing or shortness of breath, patient denies any recent URI.  CARDIOVASCULAR:  Negative for chest pain, leg swelling or palpitations.  GI:  Negative for abdominal discomfort, blood in stools or black stools or change in bowel habits, occasional constipation.  MUSCULOSKELETAL:  See HPI.  SKIN:  Negative for lesions, rash, and itching.  PSYCH:  No mood disorder or recent psychosocial stressors.  Patient's sleep is disturbed secondary to pain (patient reports also that she has insomnia).  HEMATOLOGY/LYMPHOLOGY:  Negative for prolonged bleeding, bruising easily or swollen nodes.  81 mg aspirin  ENDO: Patient has a history of diabetes  NEURO:   No history of headaches, syncope, paralysis, seizures or tremors.  All other reviewed and negative other than HPI.    OBJECTIVE:    /87   Pulse 91   Temp 98.1 °F (36.7 °C)   Ht 5' 6" (1.676 m)   Wt (!) 163.5 kg (360 lb 7.2 oz)   BMI 58.18 kg/m²     PHYSICAL EXAMINATION:    GENERAL: Well appearing, in no acute distress, alert and oriented x3.   PSYCH:  Mood and affect appropriate.  SKIN: Skin color, texture, turgor normal, no rashes or lesions.  HEAD/FACE:  Normocephalic, atraumatic.   CV: RRR with palpation of the radial artery.  PULM: No evidence of respiratory difficulty, symmetric chest rise.  BACK: Negative SLR bilaterally. There is pain to " palpation over the lumbar facet joints. There is pain to palpation over lumbar paraspinals. There is pain with palpation over thoracic paraspinals and rhomboids. Limited ROM with pain on extension.  Positive bilateral facet loading.   EXTREMITIES: Pain with palpation to bilateral SI joints.  JENNA is negative bilaterally. Well-healed midline scars to bilateral knees.  Painful extension and flexion of bilateral knees. Medial joint line tenderness to bilateral knees, left greater than right.    MUSCULOSKELETAL: Bilateral upper and lower extremity strength is normal and symmetric.  No atrophy or tone abnormalities are noted.  NEURO: Bilateral lower extremity coordination and muscle stretch reflexes are physiologic and symmetric.  Plantar response are downgoing. No clonus.  No loss of sensation is noted.  GAIT: Antalgic- ambulating without assistance.       Assessment:       Encounter Diagnoses   Name Primary?    Chronic pain disorder Yes    Spondylosis of lumbar region without myelopathy or radiculopathy     Facet arthritis of lumbar region     DDD (degenerative disc disease), lumbar     Status post total right knee replacement     Primary osteoarthritis of both knees     Bilateral chronic knee pain     Myalgia     Encounter for long-term opiate analgesic use          Plan:       - Previous imaging was reviewed and discussed with the patient today. Previous labs reviewed today.     - Continue to f/u with bariatrics for possible gastric sleeve surgery.      - She is s/p lumbar RFA with benefit. We can repeat this as needed.     - The patient will continue a home exercise routine to help with pain and strengthening. I encouraged her to follow her diet and increase her activity.     - Continue oxycodone-acetaminophen 10/325 one tablet every 6 hours PRN pain, #120, 0 refills. Refill provided today.     - The patient is here today for a refill of current pain medications and they believe these provide effective  pain control and improvements in quality of life.  The patient notes no serious side effects, and feels the benefits outweigh the risks.  The patient was reminded of the pain contract that they signed previously as well as the risks and benefits of the medication including possible death.  The updated [place] Board  Pharmacy prescription monitoring program was reviewed, and the patient has been found to be compliant with current treatment plan. Medication management provided by Dr. Wagner.     - UDS from 2/6/2018 reviewed and consistent.      - Continue topical pain cream PRN.    - Continue Zanaflex as needed.     - RTC in 1 month or sooner if needed.     - Dr. Wagner was consulted on the patient and agrees with this plan.    The above plan and management options were discussed at length with patient. Patient is in agreement with the above and verbalized understanding.     Adeola Robledo NP  05/02/2018

## 2018-05-04 DIAGNOSIS — J45.20 ASTHMA, WELL CONTROLLED, MILD INTERMITTENT: ICD-10-CM

## 2018-05-04 RX ORDER — ALBUTEROL SULFATE 90 UG/1
AEROSOL, METERED RESPIRATORY (INHALATION)
Qty: 18 G | Refills: 10 | Status: SHIPPED | OUTPATIENT
Start: 2018-05-04 | End: 2018-12-31 | Stop reason: SDUPTHER

## 2018-05-17 ENCOUNTER — HOSPITAL ENCOUNTER (OUTPATIENT)
Dept: RADIOLOGY | Facility: HOSPITAL | Age: 53
Discharge: HOME | End: 2018-05-17
Attending: OBSTETRICS & GYNECOLOGY
Payer: MEDICARE

## 2018-05-17 ENCOUNTER — OFFICE VISIT (OUTPATIENT)
Dept: OBSTETRICS AND GYNECOLOGY | Facility: CLINIC | Age: 53
End: 2018-05-17
Payer: MEDICARE

## 2018-05-17 VITALS
DIASTOLIC BLOOD PRESSURE: 80 MMHG | SYSTOLIC BLOOD PRESSURE: 130 MMHG | BODY MASS INDEX: 47.09 KG/M2 | HEIGHT: 66 IN | WEIGHT: 293 LBS

## 2018-05-17 DIAGNOSIS — Z12.31 VISIT FOR SCREENING MAMMOGRAM: ICD-10-CM

## 2018-05-17 DIAGNOSIS — Z01.419 ENCOUNTER FOR GYNECOLOGICAL EXAMINATION WITHOUT ABNORMAL FINDING: Primary | ICD-10-CM

## 2018-05-17 DIAGNOSIS — E66.01 MORBID OBESITY: ICD-10-CM

## 2018-05-17 DIAGNOSIS — E78.5 HYPERLIPIDEMIA, UNSPECIFIED HYPERLIPIDEMIA TYPE: ICD-10-CM

## 2018-05-17 DIAGNOSIS — J45.20 MILD INTERMITTENT ASTHMA WITHOUT COMPLICATION: ICD-10-CM

## 2018-05-17 PROBLEM — M47.812 CERVICAL SPONDYLOSIS: Status: ACTIVE | Noted: 2018-05-17

## 2018-05-17 PROBLEM — M50.30 DDD (DEGENERATIVE DISC DISEASE), CERVICAL: Status: ACTIVE | Noted: 2018-05-17

## 2018-05-17 PROBLEM — G56.21 CUBITAL TUNNEL SYNDROME ON RIGHT: Status: ACTIVE | Noted: 2018-05-17

## 2018-05-17 PROBLEM — M54.12 CERVICAL RADICULOPATHY: Status: ACTIVE | Noted: 2018-05-17

## 2018-05-17 PROBLEM — M75.51 BILATERAL SHOULDER BURSITIS: Status: ACTIVE | Noted: 2018-05-17

## 2018-05-17 PROBLEM — M47.814 THORACIC SPONDYLOSIS: Status: ACTIVE | Noted: 2018-05-17

## 2018-05-17 PROBLEM — M51.34 DDD (DEGENERATIVE DISC DISEASE), THORACIC: Status: ACTIVE | Noted: 2018-05-17

## 2018-05-17 PROBLEM — M43.16 SPONDYLOLISTHESIS OF LUMBAR REGION: Status: ACTIVE | Noted: 2018-05-17

## 2018-05-17 PROBLEM — M48.02 CERVICAL STENOSIS OF SPINAL CANAL: Status: ACTIVE | Noted: 2018-05-17

## 2018-05-17 PROBLEM — M47.816 LUMBAR SPONDYLOSIS: Status: ACTIVE | Noted: 2018-05-17

## 2018-05-17 PROBLEM — M75.52 BILATERAL SHOULDER BURSITIS: Status: ACTIVE | Noted: 2018-05-17

## 2018-05-17 PROCEDURE — 99999 PR PBB SHADOW E&M-EST. PATIENT-LVL III: CPT | Mod: PBBFAC,,, | Performed by: OBSTETRICS & GYNECOLOGY

## 2018-05-17 PROCEDURE — 77067 SCR MAMMO BI INCL CAD: CPT | Mod: 26,,, | Performed by: RADIOLOGY

## 2018-05-17 PROCEDURE — 77063 BREAST TOMOSYNTHESIS BI: CPT | Mod: 26,,, | Performed by: RADIOLOGY

## 2018-05-17 PROCEDURE — 77067 SCR MAMMO BI INCL CAD: CPT | Mod: TC

## 2018-05-17 PROCEDURE — 88175 CYTOPATH C/V AUTO FLUID REDO: CPT

## 2018-05-17 PROCEDURE — G0101 CA SCREEN;PELVIC/BREAST EXAM: HCPCS | Mod: S$PBB,,, | Performed by: OBSTETRICS & GYNECOLOGY

## 2018-05-17 PROCEDURE — 99213 OFFICE O/P EST LOW 20 MIN: CPT | Mod: PBBFAC | Performed by: OBSTETRICS & GYNECOLOGY

## 2018-05-17 NOTE — PROGRESS NOTES
HISTORY OF PRESENT ILLNESS:    Alivia Thomas is a 52 y.o. female, , Patient's last menstrual period was 10/01/2017.,  presents for a routine exam and has no complaints.  Last cycle in October     Past Medical History:   Diagnosis Date    Asthma     Diabetes mellitus type II     DJD (degenerative joint disease) of knee 2014    Hyperlipidemia     Morbid obesity     Sleep apnea        Past Surgical History:   Procedure Laterality Date    CARPAL TUNNEL RELEASE      CARPAL TUNNEL RELEASE  1980s    left    CARPAL TUNNEL RELEASE      right    JOINT REPLACEMENT Bilateral     with 2 revisions on rt    KNEE SURGERY  3/2010    orthroscope    KNEE SURGERY  14    left TKR       MEDICATIONS AND ALLERGIES:      Current Outpatient Prescriptions:     aspirin (ECOTRIN) 81 MG EC tablet, Take 1 tablet by mouth every morning. prevents heart attacks and strokes, Disp: , Rfl:     atorvastatin (LIPITOR) 20 MG tablet, Take 1 tablet (20 mg total) by mouth once daily., Disp: 30 tablet, Rfl: 6    blood sugar diagnostic Strp, Freestyle light strips and lancets, Disp: 100 each, Rfl: 6    blood sugar diagnostic Strp, Check glucose four times daily Strips and lancets covered by insurance E11.9 - One touch, Disp: 120 each, Rfl: 6    blood-glucose meter (FREESTYLE SYSTEM KIT) kit, Use as instructed, Disp: 1 each, Rfl: 0    blood-glucose meter kit, Check glucose four times daily E11.9 meter covered by insurance One Touch, Disp: 1 each, Rfl: 0    buPROPion (WELLBUTRIN SR) 150 MG TBSR 12 hr tablet, One daily for three days then one twice daily, Disp: 60 tablet, Rfl: 3    celecoxib (CELEBREX) 200 MG capsule, TAKE 1 CAPSULE BY MOUTH DAILY, Disp: 30 capsule, Rfl: 1    diclofenac sodium (VOLTAREN) 1 % Gel, Apply 2 g topically once daily., Disp: 1 Tube, Rfl: 3    ergocalciferol (ERGOCALCIFEROL) 50,000 unit Cap, One weekly for 8 weeks then 2,000 IU daily OTC, Disp: 8 capsule, Rfl: 0    fluticasone (FLONASE) 50  "mcg/actuation nasal spray, 1 spray by Each Nare route 2 (two) times daily as needed for Rhinitis., Disp: 15 g, Rfl: 0    insulin detemir (LEVEMIR FLEXPEN) 100 unit/mL (3 mL) SubQ InPn pen, Inject 30 Units into the skin every evening., Disp: 1 Box, Rfl: 6    insulin lispro (HUMALOG KWIKPEN) 100 unit/mL InPn pen, 11 Units before meals plus sliding scale, Disp: 1 Box, Rfl: 6    lisinopril (PRINIVIL,ZESTRIL) 2.5 MG tablet, One daily for proteinuria., Disp: 30 tablet, Rfl: 6    metFORMIN (GLUCOPHAGE-XR) 500 MG 24 hr tablet, Take 2 tablets (1,000 mg total) by mouth 2 (two) times daily., Disp: 360 tablet, Rfl: 6    omeprazole (PRILOSEC) 20 MG capsule, Take 1 capsule (20 mg total) by mouth every morning., Disp: 30 capsule, Rfl: 6    oxyCODONE-acetaminophen (PERCOCET)  mg per tablet, Take 1 tablet by mouth every 6 (six) hours as needed for Pain., Disp: 120 tablet, Rfl: 0    pen needle, diabetic 33 gauge x 5/32" Ndle, 1 application by Misc.(Non-Drug; Combo Route) route 4 (four) times daily with meals and nightly., Disp: 100 each, Rfl: 6    tiZANidine (ZANAFLEX) 2 MG tablet, Take 4 mg by mouth every 6 (six) hours as needed., Disp: , Rfl:     VENTOLIN HFA 90 mcg/actuation inhaler, USE 2 PUFFS EVERY 4 TO 6 HOURS AS NEEDED FOR SHORTNESS OF BREATH OR WHEEZE, Disp: 18 g, Rfl: 10    vitamin D 185 MG Tab, Take 5,000 mg by mouth once daily., Disp: , Rfl:     Review of patient's allergies indicates:   Allergen Reactions    Ferney        Family History   Problem Relation Age of Onset    Diabetes Mother     Hypertension Mother     Cataracts Mother     Diabetes Father     Cataracts Father     Coronary artery disease Brother     Amblyopia Neg Hx     Blindness Neg Hx     Cancer Neg Hx     Glaucoma Neg Hx     Macular degeneration Neg Hx     Retinal detachment Neg Hx     Strabismus Neg Hx     Stroke Neg Hx     Thyroid disease Neg Hx        Social History     Social History    Marital status: Single     " "Spouse name: N/A    Number of children: N/A    Years of education: N/A     Occupational History    Not on file.     Social History Main Topics    Smoking status: Never Smoker    Smokeless tobacco: Never Used    Alcohol use Yes      Comment: occasionally     Drug use: No    Sexual activity: Yes     Partners: Female     Birth control/ protection: None     Other Topics Concern    Not on file     Social History Narrative    Disabled. The patient is the youngest of 6 siblings. Single. Lives with single-sex partner.                        COMPREHENSIVE GYN HISTORY:  PAP History: Denies abnormal Paps.  Infection History: Denies STDs. Denies PID.  Benign History: Denies uterine fibroids. Denies ovarian cysts. Denies endometriosis. Denies other conditions.  Cancer History: Denies cervical cancer. Denies uterine cancer or hyperplasia. Denies ovarian cancer. Denies vulvar cancer or pre-cancer. Denies vaginal cancer or pre-cancer. Denies breast cancer. Denies colon cancer.  Sexual Activity History: Reports currently being sexually active  Menstrual History: Monthly. Mod then light flow.   Dysmenorrhea History: Reports mild dysmenorrhea.       ROS:  GENERAL: No weight changes. No swelling. No fatigue. No fever.  CARDIOVASCULAR: No chest pain. No shortness of breath. No leg cramps.   NEUROLOGICAL: No headaches. No vision changes.  BREASTS: No pain. No lumps. No discharge.  ABDOMEN: No pain. No nausea. No vomiting. No diarrhea. No constipation.  REPRODUCTIVE: No abnormal bleeding.   VULVA: No pain. No lesions. No itching.  VAGINA: No relaxation. No itching. No odor. No discharge. No lesions.  URINARY: No incontinence. No nocturia. No frequency. No dysuria.    /80   Ht 5' 6" (1.676 m)   Wt (!) 167.4 kg (369 lb)   LMP 10/01/2017   BMI 59.56 kg/m²     PE:  APPEARANCE: Well nourished, well developed, in no acute distress.  AFFECT: WNL, alert and oriented x 3.  SKIN: No acne or hirsutism.  NECK: Neck symmetric, " without masses or thyromegaly.  NODES: No inguinal, cervical, axillary or femoral lymph node enlargement.  CHEST: Good respiratory effort.   ABDOMEN: Soft. No tenderness or masses. No hepatosplenomegaly. No hernias.  BREASTS: Symmetrical, no skin changes, visible lesions, palpable masses or nipple discharge bilaterally.  PELVIC: External female genitalia without lesions.  Female hair distribution. Adequate perineal body, Normal urethral meatus. Vagina moist and well rugated without lesions or discharge.  No significant cystocele or rectocele present. Cervix pink without lesions, discharge or tenderness. Uterus is 6 week size, regular, mobile and nontender. Adnexa without masses or tenderness.  EXTREMITIES: No edema    DIAGNOSIS:  1. Encounter for gynecological examination without abnormal finding    2. Visit for screening mammogram    3. Mild intermittent asthma without complication    4. Hyperlipidemia, unspecified hyperlipidemia type    5. Morbid obesity    6. Uncontrolled type 2 diabetes mellitus with microalbuminuria, with long-term current use of insulin        PLAN:    Orders Placed This Encounter    Liquid-based pap smear, screening       COUNSELING:  The patient was counseled today on:  -A.C.S. Pap and pelvic exam guidelines (pap every 3 years), recomendations for yearly mammogram;  -to follow up with her PCP for other health maintenance.    FOLLOW-UP with me annually.

## 2018-06-01 ENCOUNTER — OFFICE VISIT (OUTPATIENT)
Dept: PAIN MEDICINE | Facility: CLINIC | Age: 53
End: 2018-06-01
Payer: MEDICARE

## 2018-06-01 VITALS
HEART RATE: 83 BPM | TEMPERATURE: 97 F | WEIGHT: 293 LBS | SYSTOLIC BLOOD PRESSURE: 143 MMHG | DIASTOLIC BLOOD PRESSURE: 98 MMHG | BODY MASS INDEX: 48.82 KG/M2 | RESPIRATION RATE: 18 BRPM | HEIGHT: 65 IN

## 2018-06-01 DIAGNOSIS — M54.12 CERVICAL RADICULOPATHY: Primary | ICD-10-CM

## 2018-06-01 DIAGNOSIS — M25.562 BILATERAL CHRONIC KNEE PAIN: ICD-10-CM

## 2018-06-01 DIAGNOSIS — G89.29 BILATERAL CHRONIC KNEE PAIN: ICD-10-CM

## 2018-06-01 DIAGNOSIS — Z79.891 ENCOUNTER FOR LONG-TERM OPIATE ANALGESIC USE: ICD-10-CM

## 2018-06-01 DIAGNOSIS — M25.561 BILATERAL CHRONIC KNEE PAIN: ICD-10-CM

## 2018-06-01 DIAGNOSIS — Z96.651 STATUS POST TOTAL RIGHT KNEE REPLACEMENT: ICD-10-CM

## 2018-06-01 DIAGNOSIS — M17.0 PRIMARY OSTEOARTHRITIS OF BOTH KNEES: ICD-10-CM

## 2018-06-01 DIAGNOSIS — G89.4 CHRONIC PAIN DISORDER: ICD-10-CM

## 2018-06-01 DIAGNOSIS — M47.816 FACET ARTHRITIS OF LUMBAR REGION: ICD-10-CM

## 2018-06-01 DIAGNOSIS — M47.816 SPONDYLOSIS OF LUMBAR REGION WITHOUT MYELOPATHY OR RADICULOPATHY: ICD-10-CM

## 2018-06-01 DIAGNOSIS — M51.36 DDD (DEGENERATIVE DISC DISEASE), LUMBAR: ICD-10-CM

## 2018-06-01 PROCEDURE — 99213 OFFICE O/P EST LOW 20 MIN: CPT | Mod: PBBFAC | Performed by: NURSE PRACTITIONER

## 2018-06-01 PROCEDURE — 99999 PR PBB SHADOW E&M-EST. PATIENT-LVL III: CPT | Mod: PBBFAC,,, | Performed by: NURSE PRACTITIONER

## 2018-06-01 PROCEDURE — 99214 OFFICE O/P EST MOD 30 MIN: CPT | Mod: S$PBB,,, | Performed by: NURSE PRACTITIONER

## 2018-06-01 RX ORDER — OXYCODONE AND ACETAMINOPHEN 10; 325 MG/1; MG/1
1 TABLET ORAL EVERY 6 HOURS PRN
Qty: 120 TABLET | Refills: 0 | Status: SHIPPED | OUTPATIENT
Start: 2018-06-04 | End: 2018-07-02 | Stop reason: SDUPTHER

## 2018-06-01 NOTE — PROGRESS NOTES
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 7.6 (H) 05/17/2018     Lab Results   Component Value Date    CHOL 152 05/17/2018    CHOL 191 05/09/2017    CHOL 200 (H) 12/15/2016     Lab Results   Component Value Date    HDL 50 05/17/2018    HDL 48 05/09/2017    HDL 40 12/15/2016     Lab Results   Component Value Date    LDLCALC 91.0 05/17/2018    LDLCALC 129.0 05/09/2017    LDLCALC 141.0 12/15/2016     Lab Results   Component Value Date    TRIG 55 05/17/2018    TRIG 70 05/09/2017    TRIG 95 12/15/2016     Lab Results   Component Value Date    CHOLHDL 32.9 05/17/2018    CHOLHDL 25.1 05/09/2017    CHOLHDL 20.0 12/15/2016         Past Medical History:   Diagnosis Date    Asthma     Diabetes mellitus type II     DJD (degenerative joint disease) of knee 6/19/2014    Heartburn     Hyperlipidemia     Morbid obesity     Sleep apnea      Past Surgical History:   Procedure Laterality Date    CARPAL TUNNEL RELEASE      CARPAL TUNNEL RELEASE  1980s    left    CARPAL TUNNEL RELEASE  2012    right    JOINT REPLACEMENT Bilateral     with 2 revisions on rt    KNEE SURGERY  3/2010    orthroscope    KNEE SURGERY  6-19-14    left TKR     Family History   Problem Relation Age of Onset    Diabetes Mother     Hypertension Mother     Cataracts Mother     Diabetes Father     Cataracts Father     Coronary artery disease Brother     Amblyopia Neg Hx     Blindness Neg Hx      Cancer Neg Hx     Glaucoma Neg Hx     Macular degeneration Neg Hx     Retinal detachment Neg Hx     Strabismus Neg Hx     Stroke Neg Hx     Thyroid disease Neg Hx      Social History     Social History    Marital status: Single     Spouse name: N/A    Number of children: N/A    Years of education: N/A     Occupational History    Not on file.     Social History Main Topics    Smoking status: Never Smoker    Smokeless tobacco: Never Used    Alcohol use Yes      Comment: occasionally     Drug use: No    Sexual activity: Yes     Partners: Female     Birth control/ protection: None     Other Topics Concern    Not on file     Social History Narrative    Disabled. The patient is the youngest of 6 siblings. Single. Lives with single-sex partner.                      Review of patient's allergies indicates:  No Known Allergies    Medication List with Changes/Refills   Current Medications    ASPIRIN (ECOTRIN) 81 MG EC TABLET    Take 1 tablet by mouth every morning. prevents heart attacks and strokes    ATORVASTATIN (LIPITOR) 20 MG TABLET    Take 1 tablet (20 mg total) by mouth once daily.    BLOOD SUGAR DIAGNOSTIC STRP    Freestyle light strips and lancets    BLOOD SUGAR DIAGNOSTIC STRP    Check glucose four times daily Strips and lancets covered by insurance E11.9 - One touch    BLOOD-GLUCOSE METER (FREESTYLE SYSTEM KIT) KIT    Use as instructed    BLOOD-GLUCOSE METER KIT    Check glucose four times daily E11.9 meter covered by insurance One Touch    BUPROPION (WELLBUTRIN SR) 150 MG TBSR 12 HR TABLET    One daily for three days then one twice daily    CELECOXIB (CELEBREX) 200 MG CAPSULE    TAKE 1 CAPSULE BY MOUTH DAILY    DICLOFENAC SODIUM (VOLTAREN) 1 % GEL    Apply 2 g topically once daily.    ERGOCALCIFEROL (ERGOCALCIFEROL) 50,000 UNIT CAP    One weekly for 8 weeks then 2,000 IU daily OTC    FLUTICASONE (FLONASE) 50 MCG/ACTUATION NASAL SPRAY    1 spray by Each Nare route 2 (two) times daily as needed for  "Rhinitis.    INSULIN DETEMIR (LEVEMIR FLEXPEN) 100 UNIT/ML (3 ML) SUBQ INPN PEN    Inject 30 Units into the skin every evening.    INSULIN LISPRO (HUMALOG KWIKPEN) 100 UNIT/ML INPN PEN    11 Units before meals plus sliding scale    LISINOPRIL (PRINIVIL,ZESTRIL) 2.5 MG TABLET    One daily for proteinuria.    METFORMIN (GLUCOPHAGE-XR) 500 MG 24 HR TABLET    Take 2 tablets (1,000 mg total) by mouth 2 (two) times daily.    OMEPRAZOLE (PRILOSEC) 20 MG CAPSULE    Take 1 capsule (20 mg total) by mouth every morning.    OXYCODONE-ACETAMINOPHEN (PERCOCET)  MG PER TABLET    Take 1 tablet by mouth every 6 (six) hours as needed for Pain.    PEN NEEDLE, DIABETIC 33 GAUGE X 5/32" NDLE    1 application by Misc.(Non-Drug; Combo Route) route 4 (four) times daily with meals and nightly.    TIZANIDINE (ZANAFLEX) 2 MG TABLET    Take 4 mg by mouth every 6 (six) hours as needed.    VENTOLIN HFA 90 MCG/ACTUATION INHALER    USE 2 PUFFS EVERY 4 TO 6 HOURS AS NEEDED FOR SHORTNESS OF BREATH OR WHEEZE    VITAMIN D 185 MG TAB    Take 5,000 mg by mouth once daily.         REVIEW OF SYSTEMS:    GENERAL:  Patient is actively losing weight.  RESPIRATORY:  Negative for cough, wheezing or shortness of breath, patient denies any recent URI.  CARDIOVASCULAR:  Negative for chest pain, leg swelling or palpitations.  GI:  Negative for abdominal discomfort, blood in stools or black stools or change in bowel habits, occasional constipation.  MUSCULOSKELETAL:  See HPI.  SKIN:  Negative for lesions, rash, and itching.  PSYCH:  No mood disorder or recent psychosocial stressors.  Patient's sleep is disturbed secondary to pain (patient reports also that she has insomnia).  HEMATOLOGY/LYMPHOLOGY:  Negative for prolonged bleeding, bruising easily or swollen nodes.  81 mg aspirin  ENDO: Patient has a history of diabetes  NEURO:   No history of headaches, syncope, paralysis, seizures or tremors.  All other reviewed and negative other than " "HPI.    OBJECTIVE:    BP (!) 143/98   Pulse 83   Temp 97.4 °F (36.3 °C) (Oral)   Resp 18   Ht 5' 5" (1.651 m)   Wt (!) 167 kg (368 lb 2.7 oz)   BMI 61.27 kg/m²     PHYSICAL EXAMINATION:    GENERAL: Well appearing, in no acute distress, alert and oriented x3.   PSYCH:  Mood and affect appropriate.  SKIN: Skin color, texture, turgor normal, no rashes or lesions.  HEAD/FACE:  Normocephalic, atraumatic.   CV: RRR with palpation of the radial artery.  PULM: No evidence of respiratory difficulty, symmetric chest rise.  NECK: There is pain with palpation over cervical paraspinal and trapezius muscles bilaterally.  There is limited ROM on extension and lateral rotation.  Positive facet loading bilaterally.  Spurling is negative bilaterally.  BACK: Negative SLR bilaterally. There is pain to palpation over the lumbar facet joints.  Limited ROM with pain on extension greater than flexion.  Positive bilateral facet loading, L>R.   EXTREMITIES: Pain with palpation to bilateral SI joints.  JENNA is negative bilaterally. Well-healed midline scars to bilateral knees.  Painful extension and flexion of bilateral knees. Medial and lateral joint line tenderness to bilateral knees.  MUSCULOSKELETAL: Bilateral upper and lower extremity strength is normal and symmetric.  No atrophy or tone abnormalities are noted.  NEURO: Bilateral lower extremity coordination and muscle stretch reflexes are physiologic and symmetric.  Plantar response are downgoing. No clonus.  No loss of sensation is noted.  GAIT: Antalgic- ambulating without assistance.       Assessment:       Encounter Diagnoses   Name Primary?    Spondylosis of lumbar region without myelopathy or radiculopathy     Facet arthritis of lumbar region     DDD (degenerative disc disease), lumbar     Primary osteoarthritis of both knees     Bilateral chronic knee pain     Status post total right knee replacement     Chronic pain disorder     Encounter for long-term opiate " analgesic use     Cervical radiculopathy Yes         Plan:       - Previous imaging was reviewed and discussed with the patient today. We discussed recent cervical MRI in detail.  Previous labs reviewed today.     - I advised against neck surgery at this time.  Would recommend interventional procedures prior to surgical intervention.  Consider cervical KERI and cervical MBBs to be followed by RFA if diagnostic.  She would like to hold off on any procedures for her neck.    - Schedule for repeat left then right L2,3,4,5 RFAs starting after 6/27/18.  Previous provided significant benefit for almost 6 months.    - Continue to f/u with bariatrics for possible gastric sleeve surgery.  We discussed diet and exercise goals today.    - Continue oxycodone-acetaminophen 10/325 one tablet every 6 hours PRN pain, #120, 0 refills. Refill provided today.     - The patient is here today for a refill of current pain medications and they believe these provide effective pain control and improvements in quality of life.  The patient notes no serious side effects, and feels the benefits outweigh the risks.  The patient was reminded of the pain contract that they signed previously as well as the risks and benefits of the medication including possible death.  The updated [place] Board of Pharmacy prescription monitoring program was reviewed, and the patient has been found to be compliant with current treatment plan. Medication management provided by Dr. Wagner.     - UDS from 2/6/2018 reviewed and consistent.      - Continue topical pain cream PRN.    - RTC in 1 month or sooner if needed.     - Dr. Wagner evaluated the patient and agrees with this plan.      The above plan and management options were discussed at length with patient. Patient is in agreement with the above and verbalized understanding.     Virginia Sanchez, EMILY  06/01/2018

## 2018-06-04 DIAGNOSIS — G89.29 CHRONIC PAIN OF BOTH KNEES: ICD-10-CM

## 2018-06-04 DIAGNOSIS — M25.562 CHRONIC PAIN OF BOTH KNEES: ICD-10-CM

## 2018-06-04 DIAGNOSIS — M25.561 CHRONIC PAIN OF BOTH KNEES: ICD-10-CM

## 2018-06-04 RX ORDER — CELECOXIB 200 MG/1
CAPSULE ORAL
Qty: 30 CAPSULE | Refills: 1 | Status: SHIPPED | OUTPATIENT
Start: 2018-06-04 | End: 2018-07-03 | Stop reason: SDUPTHER

## 2018-06-06 ENCOUNTER — TELEPHONE (OUTPATIENT)
Dept: INTERNAL MEDICINE | Facility: CLINIC | Age: 53
End: 2018-06-06

## 2018-06-06 NOTE — TELEPHONE ENCOUNTER
----- Message from Jennie Luke sent at 6/6/2018 11:24 AM CDT -----  Contact: self/[phone]  Pt called in regards to having questions about a care giver retreat in the area.       Please advise

## 2018-06-06 NOTE — TELEPHONE ENCOUNTER
Patient would like to know if there are any programs she can sign up for to get some rest from taking care of her Father. Like a retreat of some sort, or a place she can go     Please Advise  Thank You

## 2018-06-13 PROBLEM — M51.369 DDD (DEGENERATIVE DISC DISEASE), LUMBAR: Status: ACTIVE | Noted: 2018-06-13

## 2018-06-13 PROBLEM — M48.02 NEURAL FORAMINAL STENOSIS OF CERVICAL SPINE: Status: ACTIVE | Noted: 2018-06-13

## 2018-06-13 PROBLEM — M46.1 SACROILIITIS: Status: ACTIVE | Noted: 2018-06-13

## 2018-06-13 PROBLEM — M51.36 DDD (DEGENERATIVE DISC DISEASE), LUMBAR: Status: ACTIVE | Noted: 2018-06-13

## 2018-06-13 PROBLEM — M53.2X2 CERVICAL SPINE INSTABILITY: Status: ACTIVE | Noted: 2018-06-13

## 2018-06-13 PROBLEM — M70.61 TROCHANTERIC BURSITIS OF BOTH HIPS: Status: ACTIVE | Noted: 2018-06-13

## 2018-06-13 PROBLEM — M75.41 IMPINGEMENT SYNDROME OF RIGHT SHOULDER: Status: ACTIVE | Noted: 2018-06-13

## 2018-06-13 PROBLEM — M19.011 BILATERAL SHOULDER REGION ARTHRITIS: Status: ACTIVE | Noted: 2018-06-13

## 2018-06-13 PROBLEM — M70.62 TROCHANTERIC BURSITIS OF BOTH HIPS: Status: ACTIVE | Noted: 2018-06-13

## 2018-06-13 PROBLEM — M43.12 SPONDYLOLISTHESIS OF CERVICAL REGION: Status: ACTIVE | Noted: 2018-06-13

## 2018-06-13 PROBLEM — M19.012 BILATERAL SHOULDER REGION ARTHRITIS: Status: ACTIVE | Noted: 2018-06-13

## 2018-06-13 PROBLEM — M41.25 IDIOPATHIC SCOLIOSIS OF THORACOLUMBAR SPINE: Status: ACTIVE | Noted: 2018-06-13

## 2018-06-13 PROBLEM — G54.9 MYELORADICULOPATHY: Status: ACTIVE | Noted: 2018-06-13

## 2018-06-26 ENCOUNTER — HOSPITAL ENCOUNTER (OUTPATIENT)
Facility: OTHER | Age: 53
Discharge: HOME | End: 2018-06-26
Attending: ANESTHESIOLOGY | Admitting: ANESTHESIOLOGY
Payer: MEDICARE

## 2018-06-26 VITALS
HEIGHT: 65 IN | WEIGHT: 293 LBS | OXYGEN SATURATION: 97 % | BODY MASS INDEX: 48.82 KG/M2 | HEART RATE: 81 BPM | SYSTOLIC BLOOD PRESSURE: 137 MMHG | DIASTOLIC BLOOD PRESSURE: 77 MMHG | TEMPERATURE: 98 F | RESPIRATION RATE: 18 BRPM

## 2018-06-26 DIAGNOSIS — M43.16 SPONDYLOLISTHESIS OF LUMBAR REGION: Primary | ICD-10-CM

## 2018-06-26 DIAGNOSIS — M47.816 LUMBAR SPONDYLOSIS: ICD-10-CM

## 2018-06-26 LAB — POCT GLUCOSE: 323 MG/DL (ref 70–110)

## 2018-06-26 PROCEDURE — 64636 DESTROY L/S FACET JNT ADDL: CPT | Mod: LT,,, | Performed by: ANESTHESIOLOGY

## 2018-06-26 PROCEDURE — 64635 DESTROY LUMB/SAC FACET JNT: CPT | Performed by: ANESTHESIOLOGY

## 2018-06-26 PROCEDURE — 63600175 PHARM REV CODE 636 W HCPCS: Performed by: ANESTHESIOLOGY

## 2018-06-26 PROCEDURE — 82947 ASSAY GLUCOSE BLOOD QUANT: CPT | Performed by: ANESTHESIOLOGY

## 2018-06-26 PROCEDURE — 64636 DESTROY L/S FACET JNT ADDL: CPT | Performed by: ANESTHESIOLOGY

## 2018-06-26 PROCEDURE — 64635 DESTROY LUMB/SAC FACET JNT: CPT | Mod: LT,,, | Performed by: ANESTHESIOLOGY

## 2018-06-26 PROCEDURE — 25000003 PHARM REV CODE 250: Performed by: ANESTHESIOLOGY

## 2018-06-26 PROCEDURE — 99152 MOD SED SAME PHYS/QHP 5/>YRS: CPT | Mod: ,,, | Performed by: ANESTHESIOLOGY

## 2018-06-26 PROCEDURE — A4649 SURGICAL SUPPLIES: HCPCS | Performed by: ANESTHESIOLOGY

## 2018-06-26 RX ORDER — FENTANYL CITRATE 50 UG/ML
INJECTION, SOLUTION INTRAMUSCULAR; INTRAVENOUS
Status: DISCONTINUED | OUTPATIENT
Start: 2018-06-26 | End: 2018-06-26 | Stop reason: HOSPADM

## 2018-06-26 RX ORDER — BUPIVACAINE HYDROCHLORIDE 2.5 MG/ML
INJECTION, SOLUTION EPIDURAL; INFILTRATION; INTRACAUDAL
Status: DISCONTINUED | OUTPATIENT
Start: 2018-06-26 | End: 2018-06-26 | Stop reason: HOSPADM

## 2018-06-26 RX ORDER — MIDAZOLAM HYDROCHLORIDE 1 MG/ML
INJECTION INTRAMUSCULAR; INTRAVENOUS
Status: DISCONTINUED | OUTPATIENT
Start: 2018-06-26 | End: 2018-06-26 | Stop reason: HOSPADM

## 2018-06-26 RX ORDER — SODIUM CHLORIDE 9 MG/ML
INJECTION, SOLUTION INTRAVENOUS CONTINUOUS
Status: DISCONTINUED | OUTPATIENT
Start: 2018-06-26 | End: 2018-06-26 | Stop reason: HOSPADM

## 2018-06-26 RX ORDER — LIDOCAINE HYDROCHLORIDE 10 MG/ML
INJECTION INFILTRATION; PERINEURAL
Status: DISCONTINUED | OUTPATIENT
Start: 2018-06-26 | End: 2018-06-26 | Stop reason: HOSPADM

## 2018-06-26 NOTE — DISCHARGE INSTRUCTIONS
Thank you for allowing us to care for you today. You may receive a survey about the care we provided. Your feedback is valuable and helps us provide excellent care throughout the community. Home Care Instructions Pain Management:    1. DIET:   You may resume your normal diet today.   2. BATHING:   You may shower with luke warm water.  3. DRESSING:   You may remove your bandage today.   4. ACTIVITY LEVEL:   You may resume your normal activities 24 hrs after your procedure.  5. MEDICATIONS:   You may resume your normal medications today.   6. SPECIAL INSTRUCTIONS:   No heat to the injection site for 24 hrs including, bath or shower, heating pad, moist heat, or hot tubs.    Use ice pack to injection site for any pain or discomfort.  Apply ice packs for 20 minute intervals as needed.   If you have received any sedatives by mouth today you may not drive for 12 hours.    If you have received any sedation through your IV, you may not drive for 24 hrs.     PLEASE CALL YOUR DOCTOR IF:  1. Redness or swelling around the injection site.  2. Fever of 101 degrees  3. Drainage (pus) from the injection site.  4. For any continuous bleeding (some dried blood over the incision is normal.)    FOR EMERGENCIES:   If any unusual problems or difficulties occur during clinic hours, call [phone] or 130.   Adult Procedural Sedation Instructions    Recovery After Procedural Sedation (Adult)  You have been given medicine by vein to make you sleep during your surgery. This may have included both a pain medicine and sleeping medicine. Most of the effects have worn off. But you may still have some drowsiness for the next 6 to 8 hours.  Home care  Follow these guidelines when you get home:  · For the next 8 hours, you should be watched by a responsible adult. This person should make sure your condition is not getting worse.  · Don't drink any alcohol for the next 24 hours.  · Don't drive, operate dangerous machinery, or make important  business or personal decisions during the next 24 hours.  Note: Your healthcare provider may tell you not to take any medicine by mouth for pain or sleep in the next 4 hours. These medicines may react with the medicines you were given in the hospital. This could cause a much stronger response than usual.  Follow-up care  Follow up with your healthcare provider if you are not alert and back to your usual level of activity within 12 hours.  When to seek medical advice  Call your healthcare provider right away if any of these occur:  · Drowsiness gets worse  · Weakness or dizziness gets worse  · Repeated vomiting  · You can't be awakened   Date Last Reviewed: 10/18/2016  © 1241-6041 [employer]. [address] [place] [address], [place] [address]. All rights reserved. This information is not intended as a substitute for professional medical care. Always follow your healthcare professional's instructions.

## 2018-06-26 NOTE — DISCHARGE SUMMARY
Discharge Note  Short Stay      SUMMARY     Admit Date: 6/26/2018    Attending Physician: Lina Wagner      Discharge Physician: Lina Wagner      Discharge Date: 6/26/2018 12:16 PM    Procedure(s) (LRB):  RADIOFREQUENCY ABLATION, NERVE, MEDIAL BRANCH, LUMBAR, 1 LEVEL (Left)    Final Diagnosis: Facet arthritis of lumbar region [M46.96]    Disposition: Home or self care    Patient Instructions:   Current Discharge Medication List      CONTINUE these medications which have NOT CHANGED    Details   aspirin (ECOTRIN) 81 MG EC tablet Take 1 tablet by mouth every morning. prevents heart attacks and strokes      atorvastatin (LIPITOR) 20 MG tablet Take 1 tablet (20 mg total) by mouth once daily.  Qty: 30 tablet, Refills: 6    Associated Diagnoses: Hyperlipidemia, unspecified hyperlipidemia type      !! blood sugar diagnostic Strp Freestyle light strips and lancets  Qty: 100 each, Refills: 6    Associated Diagnoses: Uncontrolled type 2 diabetes mellitus without complication, with long-term current use of insulin      !! blood sugar diagnostic Strp Check glucose four times daily Strips and lancets covered by insurance E11.9 - One touch  Qty: 120 each, Refills: 6      !! blood-glucose meter (FREESTYLE SYSTEM KIT) kit Use as instructed  Qty: 1 each, Refills: 0      !! blood-glucose meter kit Check glucose four times daily E11.9 meter covered by insurance One Touch  Qty: 1 each, Refills: 0      buPROPion (WELLBUTRIN SR) 150 MG TBSR 12 hr tablet One daily for three days then one twice daily  Qty: 60 tablet, Refills: 3      celecoxib (CELEBREX) 200 MG capsule TAKE 1 CAPSULE BY MOUTH DAILY  Qty: 30 capsule, Refills: 1    Associated Diagnoses: Chronic pain of both knees      diclofenac sodium (VOLTAREN) 1 % Gel Apply 2 g topically once daily.  Qty: 1 Tube, Refills: 3      ergocalciferol (ERGOCALCIFEROL) 50,000 unit Cap One weekly for 8 weeks then 2,000 IU daily OTC  Qty: 8 capsule, Refills: 0      fluticasone (FLONASE) 50  "mcg/actuation nasal spray 1 spray by Each Nare route 2 (two) times daily as needed for Rhinitis.  Qty: 15 g, Refills: 0      insulin detemir (LEVEMIR FLEXPEN) 100 unit/mL (3 mL) SubQ InPn pen Inject 30 Units into the skin every evening.  Qty: 1 Box, Refills: 6    Associated Diagnoses: Uncontrolled type 2 diabetes mellitus without complication, with long-term current use of insulin      insulin lispro (HUMALOG KWIKPEN) 100 unit/mL InPn pen 11 Units before meals plus sliding scale  Qty: 1 Box, Refills: 6      lisinopril (PRINIVIL,ZESTRIL) 2.5 MG tablet One daily for proteinuria.  Qty: 30 tablet, Refills: 6    Associated Diagnoses: Proteinuria, unspecified type      metFORMIN (GLUCOPHAGE-XR) 500 MG 24 hr tablet Take 2 tablets (1,000 mg total) by mouth 2 (two) times daily.  Qty: 360 tablet, Refills: 6    Associated Diagnoses: Uncontrolled type 2 diabetes mellitus without complication, with long-term current use of insulin      omeprazole (PRILOSEC) 20 MG capsule Take 1 capsule (20 mg total) by mouth every morning.  Qty: 30 capsule, Refills: 6    Associated Diagnoses: Gastroesophageal reflux disease without esophagitis      oxyCODONE-acetaminophen (PERCOCET)  mg per tablet Take 1 tablet by mouth every 6 (six) hours as needed for Pain.  Qty: 120 tablet, Refills: 0    Associated Diagnoses: Spondylosis of lumbar region without myelopathy or radiculopathy; Facet arthritis of lumbar region; DDD (degenerative disc disease), lumbar; Primary osteoarthritis of both knees; Bilateral chronic knee pain; Status post total right knee replacement; Chronic pain disorder; Encounter for long-term opiate analgesic use      pen needle, diabetic 33 gauge x 5/32" Ndle 1 application by Misc.(Non-Drug; Combo Route) route 4 (four) times daily with meals and nightly.  Qty: 100 each, Refills: 6      VENTOLIN HFA 90 mcg/actuation inhaler USE 2 PUFFS EVERY 4 TO 6 HOURS AS NEEDED FOR SHORTNESS OF BREATH OR WHEEZE  Qty: 18 g, Refills: 10    " Associated Diagnoses: Asthma, well controlled, mild intermittent      vitamin D 185 MG Tab Take 5,000 mg by mouth once daily.       !! - Potential duplicate medications found. Please discuss with provider.              Discharge Diagnosis: Facet arthritis of lumbar region [M46.96]  Condition on Discharge: Stable with no complications to procedure   Diet on Discharge: Same as before.  Activity: as per instruction sheet.  Discharge to: Home with a responsible adult.  Follow up: 2-4 weeks

## 2018-06-26 NOTE — OP NOTE
Lumbar Medial nerve branch block Coolief thermal radiofrequency ablation Under Fluoroscopy     Time-out taken to identify patient and procedure side prior to starting the procedure.     06/26/2018    PROCEDURE: Left  Coolief thermal radiofrequency ablation of the the medial branch nerves at the   transverse process of L3, L4, L5 and sacral ala     2)Conscious sedation provided by MD     REASON FOR PROCEDURE: Facet arthritis of lumbar region [M46.96]     PHYSICIAN: Lina Wagner MD     ASSISTANTS: None     MEDICATIONS INJECTED: 0.25% Bupivicaine total 8mL     LOCAL ANESTHETIC USED: Xylocaine 1% 1mL / site     ESTIMATED BLOOD LOSS: None.     COMPLICATIONS: None.     Interval history: Patient reports that he had complete relief of pain for the day of the procedure, we will proceed with the RFA     TECHNIQUE: Laying in a prone position, the patient was prepped and draped in the usual sterile fashion using ChloraPrep and fenestrated drape. The level was determined under fluoroscopic guidance. Local anesthetic was given by going down to the hub of the 27-gauge 1.25in needle and raising a wheel. A 17-gauge active tip needle was introduced to the anatomic location of the medial branch at each of the above levels using fluoroscopy. Then motor testing was performed to confirm that the needle tips were in the correct location. Then after negative aspiration, 1 mL of 0.25% bupivacaine was injected into each level. This was followed by thermal lesioning at 60 degrees celsius for 150 seconds.     Conscious sedation provided by M.D   The patient was monitored with continuous pulse oximetry, EKG, and intermittent blood pressure monitors. The patient was hemodynamically stable throughout the entire process was responsive to voice, and breathing spontaneously. Supplemental O2 was provided at 2L/min via nasal cannula. Patient was comfortable for the duration of the procedure. (See nurse documentation and case log for sedation  time)    There was a total of 2mg IV Midazolam and 100 mcg Fentanyl titrated for the procedure    The patient tolerated the procedure well. Was able to move their leg at the knee and ankle at the conclusion of the procedure    The patient was monitored after the procedure. Patient was given post procedure and discharge instructions to follow at home. We will see the patient back in two weeks or the patient may call to inform of status. The patient was discharged in a stable condition

## 2018-06-26 NOTE — PLAN OF CARE
Pt tolerated procedure well. Pain rated at 0/10. Assisted up for the first time, steady on feet. Discharge instructions given.

## 2018-06-27 NOTE — H&P (VIEW-ONLY)
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    HGBA1C 7.6 (H) 05/17/2018     Lab Results   Component Value Date    CHOL 152 05/17/2018    CHOL 191 05/09/2017    CHOL 200 (H) 12/15/2016     Lab Results   Component Value Date    HDL 50 05/17/2018    HDL 48 05/09/2017    HDL 40 12/15/2016     Lab Results   Component Value Date    LDLCALC 91.0 05/17/2018    LDLCALC 129.0 05/09/2017    LDLCALC 141.0 12/15/2016     Lab Results   Component Value Date    TRIG 55 05/17/2018    TRIG 70 05/09/2017    TRIG 95 12/15/2016     Lab Results   Component Value Date    CHOLHDL 32.9 05/17/2018    CHOLHDL 25.1 05/09/2017    CHOLHDL 20.0 12/15/2016         Past Medical History:   Diagnosis Date    Asthma     Diabetes mellitus type II     DJD (degenerative joint disease) of knee 6/19/2014    Heartburn     Hyperlipidemia     Morbid obesity     Sleep apnea      Past Surgical History:   Procedure Laterality Date    CARPAL TUNNEL RELEASE      CARPAL TUNNEL RELEASE  1980s    left    CARPAL TUNNEL RELEASE  2012    right    JOINT REPLACEMENT Bilateral     with 2 revisions on rt    KNEE SURGERY  3/2010    orthroscope    KNEE SURGERY  6-19-14    left TKR     Family History   Problem Relation Age of Onset    Diabetes Mother     Hypertension Mother     Cataracts Mother     Diabetes Father     Cataracts Father     Coronary artery disease Brother     Amblyopia Neg Hx     Blindness Neg Hx      Cancer Neg Hx     Glaucoma Neg Hx     Macular degeneration Neg Hx     Retinal detachment Neg Hx     Strabismus Neg Hx     Stroke Neg Hx     Thyroid disease Neg Hx      Social History     Social History    Marital status: Single     Spouse name: N/A    Number of children: N/A    Years of education: N/A     Occupational History    Not on file.     Social History Main Topics    Smoking status: Never Smoker    Smokeless tobacco: Never Used    Alcohol use Yes      Comment: occasionally     Drug use: No    Sexual activity: Yes     Partners: Female     Birth control/ protection: None     Other Topics Concern    Not on file     Social History Narrative    Disabled. The patient is the youngest of 6 siblings. Single. Lives with single-sex partner.                      Review of patient's allergies indicates:  No Known Allergies    Medication List with Changes/Refills   Current Medications    ASPIRIN (ECOTRIN) 81 MG EC TABLET    Take 1 tablet by mouth every morning. prevents heart attacks and strokes    ATORVASTATIN (LIPITOR) 20 MG TABLET    Take 1 tablet (20 mg total) by mouth once daily.    BLOOD SUGAR DIAGNOSTIC STRP    Freestyle light strips and lancets    BLOOD SUGAR DIAGNOSTIC STRP    Check glucose four times daily Strips and lancets covered by insurance E11.9 - One touch    BLOOD-GLUCOSE METER (FREESTYLE SYSTEM KIT) KIT    Use as instructed    BLOOD-GLUCOSE METER KIT    Check glucose four times daily E11.9 meter covered by insurance One Touch    BUPROPION (WELLBUTRIN SR) 150 MG TBSR 12 HR TABLET    One daily for three days then one twice daily    CELECOXIB (CELEBREX) 200 MG CAPSULE    TAKE 1 CAPSULE BY MOUTH DAILY    DICLOFENAC SODIUM (VOLTAREN) 1 % GEL    Apply 2 g topically once daily.    ERGOCALCIFEROL (ERGOCALCIFEROL) 50,000 UNIT CAP    One weekly for 8 weeks then 2,000 IU daily OTC    FLUTICASONE (FLONASE) 50 MCG/ACTUATION NASAL SPRAY    1 spray by Each Nare route 2 (two) times daily as needed for  "Rhinitis.    INSULIN DETEMIR (LEVEMIR FLEXPEN) 100 UNIT/ML (3 ML) SUBQ INPN PEN    Inject 30 Units into the skin every evening.    INSULIN LISPRO (HUMALOG KWIKPEN) 100 UNIT/ML INPN PEN    11 Units before meals plus sliding scale    LISINOPRIL (PRINIVIL,ZESTRIL) 2.5 MG TABLET    One daily for proteinuria.    METFORMIN (GLUCOPHAGE-XR) 500 MG 24 HR TABLET    Take 2 tablets (1,000 mg total) by mouth 2 (two) times daily.    OMEPRAZOLE (PRILOSEC) 20 MG CAPSULE    Take 1 capsule (20 mg total) by mouth every morning.    OXYCODONE-ACETAMINOPHEN (PERCOCET)  MG PER TABLET    Take 1 tablet by mouth every 6 (six) hours as needed for Pain.    PEN NEEDLE, DIABETIC 33 GAUGE X 5/32" NDLE    1 application by Misc.(Non-Drug; Combo Route) route 4 (four) times daily with meals and nightly.    TIZANIDINE (ZANAFLEX) 2 MG TABLET    Take 4 mg by mouth every 6 (six) hours as needed.    VENTOLIN HFA 90 MCG/ACTUATION INHALER    USE 2 PUFFS EVERY 4 TO 6 HOURS AS NEEDED FOR SHORTNESS OF BREATH OR WHEEZE    VITAMIN D 185 MG TAB    Take 5,000 mg by mouth once daily.         REVIEW OF SYSTEMS:    GENERAL:  Patient is actively losing weight.  RESPIRATORY:  Negative for cough, wheezing or shortness of breath, patient denies any recent URI.  CARDIOVASCULAR:  Negative for chest pain, leg swelling or palpitations.  GI:  Negative for abdominal discomfort, blood in stools or black stools or change in bowel habits, occasional constipation.  MUSCULOSKELETAL:  See HPI.  SKIN:  Negative for lesions, rash, and itching.  PSYCH:  No mood disorder or recent psychosocial stressors.  Patient's sleep is disturbed secondary to pain (patient reports also that she has insomnia).  HEMATOLOGY/LYMPHOLOGY:  Negative for prolonged bleeding, bruising easily or swollen nodes.  81 mg aspirin  ENDO: Patient has a history of diabetes  NEURO:   No history of headaches, syncope, paralysis, seizures or tremors.  All other reviewed and negative other than " "HPI.    OBJECTIVE:    BP (!) 143/98   Pulse 83   Temp 97.4 °F (36.3 °C) (Oral)   Resp 18   Ht 5' 5" (1.651 m)   Wt (!) 167 kg (368 lb 2.7 oz)   BMI 61.27 kg/m²     PHYSICAL EXAMINATION:    GENERAL: Well appearing, in no acute distress, alert and oriented x3.   PSYCH:  Mood and affect appropriate.  SKIN: Skin color, texture, turgor normal, no rashes or lesions.  HEAD/FACE:  Normocephalic, atraumatic.   CV: RRR with palpation of the radial artery.  PULM: No evidence of respiratory difficulty, symmetric chest rise.  NECK: There is pain with palpation over cervical paraspinal and trapezius muscles bilaterally.  There is limited ROM on extension and lateral rotation.  Positive facet loading bilaterally.  Spurling is negative bilaterally.  BACK: Negative SLR bilaterally. There is pain to palpation over the lumbar facet joints.  Limited ROM with pain on extension greater than flexion.  Positive bilateral facet loading, L>R.   EXTREMITIES: Pain with palpation to bilateral SI joints.  JENNA is negative bilaterally. Well-healed midline scars to bilateral knees.  Painful extension and flexion of bilateral knees. Medial and lateral joint line tenderness to bilateral knees.  MUSCULOSKELETAL: Bilateral upper and lower extremity strength is normal and symmetric.  No atrophy or tone abnormalities are noted.  NEURO: Bilateral lower extremity coordination and muscle stretch reflexes are physiologic and symmetric.  Plantar response are downgoing. No clonus.  No loss of sensation is noted.  GAIT: Antalgic- ambulating without assistance.       Assessment:       Encounter Diagnoses   Name Primary?    Spondylosis of lumbar region without myelopathy or radiculopathy     Facet arthritis of lumbar region     DDD (degenerative disc disease), lumbar     Primary osteoarthritis of both knees     Bilateral chronic knee pain     Status post total right knee replacement     Chronic pain disorder     Encounter for long-term opiate " analgesic use     Cervical radiculopathy Yes         Plan:       - Previous imaging was reviewed and discussed with the patient today. We discussed recent cervical MRI in detail.  Previous labs reviewed today.     - I advised against neck surgery at this time.  Would recommend interventional procedures prior to surgical intervention.  Consider cervical KERI and cervical MBBs to be followed by RFA if diagnostic.  She would like to hold off on any procedures for her neck.    - Schedule for repeat left then right L2,3,4,5 RFAs starting after 6/27/18.  Previous provided significant benefit for almost 6 months.    - Continue to f/u with bariatrics for possible gastric sleeve surgery.  We discussed diet and exercise goals today.    - Continue oxycodone-acetaminophen 10/325 one tablet every 6 hours PRN pain, #120, 0 refills. Refill provided today.     - The patient is here today for a refill of current pain medications and they believe these provide effective pain control and improvements in quality of life.  The patient notes no serious side effects, and feels the benefits outweigh the risks.  The patient was reminded of the pain contract that they signed previously as well as the risks and benefits of the medication including possible death.  The updated [place] Board of Pharmacy prescription monitoring program was reviewed, and the patient has been found to be compliant with current treatment plan. Medication management provided by Dr. Wagner.     - UDS from 2/6/2018 reviewed and consistent.      - Continue topical pain cream PRN.    - RTC in 1 month or sooner if needed.     - Dr. Wagnre evaluated the patient and agrees with this plan.      The above plan and management options were discussed at length with patient. Patient is in agreement with the above and verbalized understanding.     Virginia Sanchez, EMILY  06/01/2018

## 2018-06-28 ENCOUNTER — OFFICE VISIT (OUTPATIENT)
Dept: INTERNAL MEDICINE | Facility: CLINIC | Age: 53
End: 2018-06-28
Payer: MEDICARE

## 2018-06-28 VITALS
HEART RATE: 91 BPM | OXYGEN SATURATION: 97 % | SYSTOLIC BLOOD PRESSURE: 134 MMHG | DIASTOLIC BLOOD PRESSURE: 70 MMHG | TEMPERATURE: 98 F | WEIGHT: 293 LBS | HEIGHT: 65 IN | BODY MASS INDEX: 48.82 KG/M2

## 2018-06-28 DIAGNOSIS — E78.5 HYPERLIPIDEMIA, UNSPECIFIED HYPERLIPIDEMIA TYPE: ICD-10-CM

## 2018-06-28 DIAGNOSIS — D64.9 ANEMIA, UNSPECIFIED TYPE: ICD-10-CM

## 2018-06-28 DIAGNOSIS — J45.20 MILD INTERMITTENT ASTHMA WITHOUT COMPLICATION: Primary | ICD-10-CM

## 2018-06-28 DIAGNOSIS — R80.9 PROTEINURIA, UNSPECIFIED TYPE: ICD-10-CM

## 2018-06-28 DIAGNOSIS — E55.9 VITAMIN D DEFICIENCY: ICD-10-CM

## 2018-06-28 DIAGNOSIS — G47.30 SLEEP APNEA, UNSPECIFIED TYPE: ICD-10-CM

## 2018-06-28 LAB
AMORPH CRY UR QL COMP ASSIST: ABNORMAL
BACTERIA #/AREA URNS AUTO: ABNORMAL /HPF
BILIRUB UR QL STRIP: NEGATIVE
CLARITY UR REFRACT.AUTO: ABNORMAL
COLOR UR AUTO: YELLOW
CREAT UR-MCNC: 170 MG/DL
GLUCOSE UR QL STRIP: ABNORMAL
HGB UR QL STRIP: NEGATIVE
HYALINE CASTS UR QL AUTO: 2 /LPF
KETONES UR QL STRIP: NEGATIVE
LEUKOCYTE ESTERASE UR QL STRIP: NEGATIVE
MICROALBUMIN UR DL<=1MG/L-MCNC: 17 UG/ML
MICROALBUMIN/CREATININE RATIO: 10 UG/MG
MICROSCOPIC COMMENT: ABNORMAL
NITRITE UR QL STRIP: NEGATIVE
NON-SQ EPI CELLS #/AREA URNS AUTO: 2 /HPF
PH UR STRIP: 5 [PH] (ref 5–8)
PROT UR QL STRIP: NEGATIVE
RBC #/AREA URNS AUTO: 1 /HPF (ref 0–4)
SP GR UR STRIP: 1.02 (ref 1–1.03)
SQUAMOUS #/AREA URNS AUTO: 3 /HPF
URN SPEC COLLECT METH UR: ABNORMAL
UROBILINOGEN UR STRIP-ACNC: NEGATIVE EU/DL

## 2018-06-28 PROCEDURE — 99214 OFFICE O/P EST MOD 30 MIN: CPT | Mod: S$PBB,,, | Performed by: INTERNAL MEDICINE

## 2018-06-28 PROCEDURE — 99999 PR PBB SHADOW E&M-EST. PATIENT-LVL V: CPT | Mod: PBBFAC,,, | Performed by: INTERNAL MEDICINE

## 2018-06-28 PROCEDURE — 99215 OFFICE O/P EST HI 40 MIN: CPT | Mod: PBBFAC,PO | Performed by: INTERNAL MEDICINE

## 2018-06-28 PROCEDURE — 82043 UR ALBUMIN QUANTITATIVE: CPT

## 2018-06-28 PROCEDURE — 81001 URINALYSIS AUTO W/SCOPE: CPT

## 2018-06-28 RX ORDER — DIETHYLPROPION HYDROCHLORIDE 75 MG/1
75 TABLET, EXTENDED RELEASE ORAL DAILY
Qty: 30 TABLET | Refills: 1 | Status: SHIPPED | OUTPATIENT
Start: 2018-06-28 | End: 2018-11-26 | Stop reason: ALTCHOICE

## 2018-06-28 NOTE — PROGRESS NOTES
Subjective:       Patient ID: Alivia Thomas is a 52 y.o. female.    Chief Complaint: Follow-up    HPIPt is feeling better her siblings have helped her out a lot with her dad this month.  She wants to try the diethylpropion for weight loss.  No CP or SOB.  She is going back to exercise at [employer].    Review of Systems   Respiratory: Negative for shortness of breath (PND or orthopnea).    Cardiovascular: Negative for chest pain (arm pain or jaw pain).   Gastrointestinal: Negative for abdominal pain, diarrhea, nausea and vomiting.   Genitourinary: Negative for dysuria.   Neurological: Negative for seizures, syncope and headaches.       Objective:      Physical Exam   Constitutional: She is oriented to person, place, and time. She appears well-developed and well-nourished. No distress.   HENT:   Head: Normocephalic.   Mouth/Throat: Oropharynx is clear and moist.   Neck: Neck supple. No JVD present. No thyromegaly present.   Cardiovascular: Normal rate, regular rhythm, normal heart sounds and intact distal pulses.  Exam reveals no gallop and no friction rub.    No murmur heard.  Pulmonary/Chest: Effort normal and breath sounds normal. She has no wheezes. She has no rales.   Abdominal: Soft. Bowel sounds are normal. She exhibits no distension and no mass. There is no tenderness. There is no rebound and no guarding.   Musculoskeletal: She exhibits no edema.   No foot lesions   Lymphadenopathy:     She has no cervical adenopathy.   Neurological: She is alert and oriented to person, place, and time. She has normal reflexes.   Skin: Skin is warm and dry.   Psychiatric: She has a normal mood and affect. Her behavior is normal. Judgment and thought content normal.       Assessment:       1. Mild intermittent asthma without complication    2. Hyperlipidemia, unspecified hyperlipidemia type    3. Proteinuria, unspecified type    4. Uncontrolled type 2 diabetes mellitus with microalbuminuria, with long-term current use  of insulin    5. Sleep apnea, unspecified type    6. Anemia, unspecified type    7. Vitamin D deficiency        Plan:   Mild intermittent asthma without complication  Controlled - continue current meds    Hyperlipidemia, unspecified hyperlipidemia type  Controlled - continue current meds    Proteinuria, unspecified type  Check urine today  Uncontrolled type 2 diabetes mellitus with microalbuminuria, with long-term current use of insulin  -     Urinalysis  -     Microalbumin/creatinine urine ratio  -     Comprehensive metabolic panel; Future; Expected date: 06/28/2018  -     Hemoglobin A1c; Future; Expected date: 06/28/2018    Sleep apnea, unspecified type  Pt will try to use CPAP   Anemia, unspecified type  -     CBC auto differential; Future; Expected date: 06/28/2018  -     Iron and TIBC; Future; Expected date: 06/28/2018  -     Ferritin; Future; Expected date: 06/28/2018  Due to heavy cycles  Vitamin D deficiency  -     Vitamin D; Future; Expected date: 06/28/2018    Other orders  -     diethylpropion 75 mg TbSR; Take 75 mg by mouth once daily.  Dispense: 30 tablet; Refill: 1

## 2018-07-02 ENCOUNTER — TELEPHONE (OUTPATIENT)
Dept: PAIN MEDICINE | Facility: CLINIC | Age: 53
End: 2018-07-02

## 2018-07-02 DIAGNOSIS — M25.561 BILATERAL CHRONIC KNEE PAIN: ICD-10-CM

## 2018-07-02 DIAGNOSIS — M47.816 FACET ARTHRITIS OF LUMBAR REGION: ICD-10-CM

## 2018-07-02 DIAGNOSIS — Z79.891 ENCOUNTER FOR LONG-TERM OPIATE ANALGESIC USE: ICD-10-CM

## 2018-07-02 DIAGNOSIS — M51.36 DDD (DEGENERATIVE DISC DISEASE), LUMBAR: ICD-10-CM

## 2018-07-02 DIAGNOSIS — M47.816 SPONDYLOSIS OF LUMBAR REGION WITHOUT MYELOPATHY OR RADICULOPATHY: ICD-10-CM

## 2018-07-02 DIAGNOSIS — G89.29 BILATERAL CHRONIC KNEE PAIN: ICD-10-CM

## 2018-07-02 DIAGNOSIS — G89.4 CHRONIC PAIN DISORDER: ICD-10-CM

## 2018-07-02 DIAGNOSIS — M17.0 PRIMARY OSTEOARTHRITIS OF BOTH KNEES: ICD-10-CM

## 2018-07-02 DIAGNOSIS — M25.562 BILATERAL CHRONIC KNEE PAIN: ICD-10-CM

## 2018-07-02 DIAGNOSIS — Z96.651 STATUS POST TOTAL RIGHT KNEE REPLACEMENT: ICD-10-CM

## 2018-07-02 RX ORDER — OXYCODONE AND ACETAMINOPHEN 10; 325 MG/1; MG/1
1 TABLET ORAL EVERY 6 HOURS PRN
Qty: 120 TABLET | Refills: 0 | Status: SHIPPED | OUTPATIENT
Start: 2018-07-02 | End: 2018-08-01 | Stop reason: SDUPTHER

## 2018-07-02 NOTE — TELEPHONE ENCOUNTER
----- Message from Lianna Dawn sent at 7/2/2018  8:13 AM CDT -----  Name of Who is Calling:DONTAE MOON [[id number]]      What is the request in detail: Pt would like to speak with staff about a medication refill. Pt states she is not able to make her appt today. Advised pt that she was coming in for med refill today pt states she already talked to zak. Please call to further discuss and advise.       Can the clinic reply by [facility]:    No       What Number to Call Back if not in [facility]: [phone]

## 2018-07-02 NOTE — TELEPHONE ENCOUNTER
Contacted and spoke with pt regarding medication the patient stated she was unable to make appointment due to bringing her Dad to his appointment today. Pt explained she was informed by [place] that if she can't make her appointment she can just call in for refill.

## 2018-07-03 DIAGNOSIS — G89.29 CHRONIC PAIN OF BOTH KNEES: ICD-10-CM

## 2018-07-03 DIAGNOSIS — M25.561 CHRONIC PAIN OF BOTH KNEES: ICD-10-CM

## 2018-07-03 DIAGNOSIS — M25.562 CHRONIC PAIN OF BOTH KNEES: ICD-10-CM

## 2018-07-03 RX ORDER — CELECOXIB 200 MG/1
CAPSULE ORAL
Qty: 30 CAPSULE | Refills: 1 | Status: SHIPPED | OUTPATIENT
Start: 2018-07-03 | End: 2018-09-29 | Stop reason: SDUPTHER

## 2018-07-10 ENCOUNTER — SURGERY (OUTPATIENT)
Age: 53
End: 2018-07-10

## 2018-07-10 ENCOUNTER — HOSPITAL ENCOUNTER (OUTPATIENT)
Facility: OTHER | Age: 53
Discharge: HOME | End: 2018-07-10
Attending: ANESTHESIOLOGY | Admitting: ANESTHESIOLOGY
Payer: MEDICARE

## 2018-07-10 VITALS
HEART RATE: 78 BPM | BODY MASS INDEX: 48.82 KG/M2 | TEMPERATURE: 99 F | HEIGHT: 65 IN | SYSTOLIC BLOOD PRESSURE: 133 MMHG | WEIGHT: 293 LBS | OXYGEN SATURATION: 98 % | RESPIRATION RATE: 18 BRPM | DIASTOLIC BLOOD PRESSURE: 76 MMHG

## 2018-07-10 DIAGNOSIS — M47.816 FACET ARTHRITIS OF LUMBAR REGION: Primary | ICD-10-CM

## 2018-07-10 LAB — POCT GLUCOSE: 255 MG/DL (ref 70–110)

## 2018-07-10 PROCEDURE — 63600175 PHARM REV CODE 636 W HCPCS: Performed by: ANESTHESIOLOGY

## 2018-07-10 PROCEDURE — 99152 MOD SED SAME PHYS/QHP 5/>YRS: CPT | Mod: ,,, | Performed by: ANESTHESIOLOGY

## 2018-07-10 PROCEDURE — 64635 DESTROY LUMB/SAC FACET JNT: CPT | Performed by: ANESTHESIOLOGY

## 2018-07-10 PROCEDURE — 25000003 PHARM REV CODE 250: Performed by: ANESTHESIOLOGY

## 2018-07-10 PROCEDURE — A4649 SURGICAL SUPPLIES: HCPCS | Performed by: ANESTHESIOLOGY

## 2018-07-10 PROCEDURE — 64636 DESTROY L/S FACET JNT ADDL: CPT | Performed by: ANESTHESIOLOGY

## 2018-07-10 PROCEDURE — 82947 ASSAY GLUCOSE BLOOD QUANT: CPT | Performed by: ANESTHESIOLOGY

## 2018-07-10 PROCEDURE — 64636 DESTROY L/S FACET JNT ADDL: CPT | Mod: RT,,, | Performed by: ANESTHESIOLOGY

## 2018-07-10 PROCEDURE — 64635 DESTROY LUMB/SAC FACET JNT: CPT | Mod: RT,,, | Performed by: ANESTHESIOLOGY

## 2018-07-10 RX ORDER — SODIUM CHLORIDE 9 MG/ML
INJECTION, SOLUTION INTRAVENOUS CONTINUOUS
Status: DISCONTINUED | OUTPATIENT
Start: 2018-07-10 | End: 2018-07-10 | Stop reason: HOSPADM

## 2018-07-10 RX ORDER — MIDAZOLAM HYDROCHLORIDE 1 MG/ML
INJECTION INTRAMUSCULAR; INTRAVENOUS
Status: DISCONTINUED | OUTPATIENT
Start: 2018-07-10 | End: 2018-07-10 | Stop reason: HOSPADM

## 2018-07-10 RX ORDER — BUPIVACAINE HYDROCHLORIDE 2.5 MG/ML
INJECTION, SOLUTION EPIDURAL; INFILTRATION; INTRACAUDAL
Status: DISCONTINUED | OUTPATIENT
Start: 2018-07-10 | End: 2018-07-10 | Stop reason: HOSPADM

## 2018-07-10 RX ORDER — FENTANYL CITRATE 50 UG/ML
INJECTION, SOLUTION INTRAMUSCULAR; INTRAVENOUS
Status: DISCONTINUED | OUTPATIENT
Start: 2018-07-10 | End: 2018-07-10 | Stop reason: HOSPADM

## 2018-07-10 RX ORDER — LIDOCAINE HYDROCHLORIDE 10 MG/ML
INJECTION INFILTRATION; PERINEURAL
Status: DISCONTINUED | OUTPATIENT
Start: 2018-07-10 | End: 2018-07-10 | Stop reason: HOSPADM

## 2018-07-10 RX ADMIN — BUPIVACAINE HYDROCHLORIDE 10 ML: 2.5 INJECTION, SOLUTION EPIDURAL; INFILTRATION; INTRACAUDAL; PERINEURAL at 10:07

## 2018-07-10 RX ADMIN — FENTANYL CITRATE 25 MCG: 50 INJECTION, SOLUTION INTRAMUSCULAR; INTRAVENOUS at 11:07

## 2018-07-10 RX ADMIN — FENTANYL CITRATE 50 MCG: 50 INJECTION, SOLUTION INTRAMUSCULAR; INTRAVENOUS at 10:07

## 2018-07-10 RX ADMIN — SODIUM CHLORIDE: 0.9 INJECTION, SOLUTION INTRAVENOUS at 10:07

## 2018-07-10 RX ADMIN — MIDAZOLAM HYDROCHLORIDE 2 MG: 1 INJECTION, SOLUTION INTRAMUSCULAR; INTRAVENOUS at 10:07

## 2018-07-10 RX ADMIN — LIDOCAINE HYDROCHLORIDE 10 ML: 10 INJECTION, SOLUTION INFILTRATION; PERINEURAL at 10:07

## 2018-07-10 NOTE — OP NOTE
Lumbar Medial nerve branch block Coolief thermal radiofrequency ablation Under Fluoroscopy     Time-out taken to identify patient and procedure side prior to starting the procedure.     07/10/2018    PROCEDURE: Right  Coolief thermal radiofrequency ablation of the the medial branch nerves at the   transverse process of L3, L4, L5 and sacral ala     2)Conscious sedation provided by MD     REASON FOR PROCEDURE: Facet arthritis of lumbar region [M46.96]     PHYSICIAN: Lina Wagner MD     Assistants:   Rox Jay, PGY-5, Pain Fellow  I was present and supervising all critical portions of the procedure      MEDICATIONS INJECTED: 0.25% Bupivicaine total 3mL     LOCAL ANESTHETIC USED: Xylocaine 1% 1mL / site     ESTIMATED BLOOD LOSS: None.     COMPLICATIONS: None.     Interval history: Patient reports that he had complete relief of pain for the day of the procedure, we will proceed with the RFA     TECHNIQUE: Laying in a prone position, the patient was prepped and draped in the usual sterile fashion using ChloraPrep and fenestrated drape. The level was determined under fluoroscopic guidance. Local anesthetic was given by going down to the hub of the 27-gauge 1.25in needle and raising a wheel. A 17-gauge active tip needle was introduced to the anatomic location of the medial branch at each of the above levels using fluoroscopy. Then motor testing was performed to confirm that the needle tips were in the correct location. Then after negative aspiration, 1 mL of 0.25% bupivacaine was injected into each level. This was followed by thermal lesioning at 60 degrees celsius for 150 seconds.     Conscious sedation provided by M.D   The patient was monitored with continuous pulse oximetry, EKG, and intermittent blood pressure monitors. The patient was hemodynamically stable throughout the entire process was responsive to voice, and breathing spontaneously. Supplemental O2 was provided at 2L/min via nasal cannula. Patient was  comfortable for the duration of the procedure. (See nurse documentation and case log for sedation time)    There was a total of 2mg IV Midazolam and 75 mcg Fentanyl titrated for the procedure    The patient tolerated the procedure well. Was able to move their leg at the knee and ankle at the conclusion of the procedure    The patient was monitored after the procedure. Patient was given post procedure and discharge instructions to follow at home. We will see the patient back in two weeks or the patient may call to inform of status. The patient was discharged in a stable condition

## 2018-07-10 NOTE — H&P
"HPI      PMHx, PSHx, Allergies, Medications reviewed in epic    ROS negative except pain complaints in HPI    OBJECTIVE:    /64 (BP Location: Right arm, Patient Position: Sitting)   Pulse 89   Temp 98.5 °F (36.9 °C) (Oral)   Resp 20   Ht 5' 5" (1.651 m)   Wt (!) 166.9 kg (368 lb)   SpO2 99%   BMI 61.24 kg/m²     PHYSICAL EXAMINATION:    GENERAL: Well appearing, in no acute distress, alert and oriented x3.  PSYCH:  Mood and affect appropriate.  SKIN: Skin color, texture, turgor normal, no rashes or lesions.  CV: RRR with palpation of the radial artery.  PULM: No evidence of respiratory difficulty, symmetric chest rise. Clear to auscultation.  NEURO: Cranial nerves grossly intact.    Plan:    Proceed with procedure as planned    Rox Jay  07/10/2018  "

## 2018-07-10 NOTE — DISCHARGE INSTRUCTIONS
Adult Procedural Sedation Instructions    Recovery After Procedural Sedation (Adult)  You have been given medicine by vein to make you sleep during your surgery. This may have included both a pain medicine and sleeping medicine. Most of the effects have worn off. But you may still have some drowsiness for the next 6 to 8 hours.  Home care  Follow these guidelines when you get home:  · For the next 8 hours, you should be watched by a responsible adult. This person should make sure your condition is not getting worse.  · Don't drink any alcohol for the next 24 hours.  · Don't drive, operate dangerous machinery, or make important business or personal decisions during the next 24 hours.  Note: Your healthcare provider may tell you not to take any medicine by mouth for pain or sleep in the next 4 hours. These medicines may react with the medicines you were given in the hospital. This could cause a much stronger response than usual.  Follow-up care  Follow up with your healthcare provider if you are not alert and back to your usual level of activity within 12 hours.  When to seek medical advice  Call your healthcare provider right away if any of these occur:  · Drowsiness gets worse  · Weakness or dizziness gets worse  · Repeated vomiting  · You can't be awakened   Date Last Reviewed: 10/18/2016  © 5361-6057 [employer]. [address] [place] [address], [place] [address]. All rights reserved. This information is not intended as a substitute for professional medical care. Always follow your healthcare professional's instructions.    Home Care Instructions Pain Management:    1. DIET:   You may resume your normal diet today.   2. BATHING:   You may shower with luke warm water.  3. DRESSING:   You may remove your bandage today.   4. ACTIVITY LEVEL:   You may resume your normal activities 24 hrs after your procedure.  5. MEDICATIONS:   You may resume your normal medications today.   6. SPECIAL INSTRUCTIONS:   No heat to  the injection site for 24 hrs including, bath or shower, heating pad, moist heat, or hot tubs.    Use ice pack to injection site for any pain or discomfort.  Apply ice packs for 20 minute intervals as needed.   If you have received any sedatives by mouth today you may not drive for 12 hours.    If you have received any sedation through your IV, you may not drive for 24 hrs.     PLEASE CALL YOUR DOCTOR IF:  1. Redness or swelling around the injection site.  2. Fever of 101 degrees  3. Drainage (pus) from the injection site.  4. For any continuous bleeding (some dried blood over the incision is normal.)    FOR EMERGENCIES:   If any unusual problems or difficulties occur during clinic hours, call [phone] or [phone].     Thank you for allowing us to care for you today. You may receive a survey about the care we provided. Your feedback is valuable and helps us provide excellent care throughout the community.

## 2018-07-10 NOTE — DISCHARGE SUMMARY
Discharge Note  Short Stay      SUMMARY     Admit Date: 7/10/2018    Attending Physician: Lina Wagner      Discharge Physician: Lina Wagner      Discharge Date: 7/10/2018 11:17 AM    Procedure(s) (LRB):  RADIOFREQUENCY ABLATION, NERVE, MEDIAL BRANCH, LUMBAR, 1 LEVEL (Right)    Final Diagnosis: Facet arthritis of lumbar region [M46.96]    Disposition: Home or self care    Patient Instructions:   Current Discharge Medication List      CONTINUE these medications which have NOT CHANGED    Details   aspirin (ECOTRIN) 81 MG EC tablet Take 1 tablet by mouth every morning. prevents heart attacks and strokes      atorvastatin (LIPITOR) 20 MG tablet Take 1 tablet (20 mg total) by mouth once daily.  Qty: 30 tablet, Refills: 6    Associated Diagnoses: Hyperlipidemia, unspecified hyperlipidemia type      !! blood sugar diagnostic Strp Freestyle light strips and lancets  Qty: 100 each, Refills: 6    Associated Diagnoses: Uncontrolled type 2 diabetes mellitus without complication, with long-term current use of insulin      !! blood sugar diagnostic Strp Check glucose four times daily Strips and lancets covered by insurance E11.9 - One touch  Qty: 120 each, Refills: 6      !! blood-glucose meter (FREESTYLE SYSTEM KIT) kit Use as instructed  Qty: 1 each, Refills: 0      !! blood-glucose meter kit Check glucose four times daily E11.9 meter covered by insurance One Touch  Qty: 1 each, Refills: 0      celecoxib (CELEBREX) 200 MG capsule TAKE 1 CAPSULE BY MOUTH DAILY  Qty: 30 capsule, Refills: 1    Associated Diagnoses: Chronic pain of both knees      diclofenac sodium (VOLTAREN) 1 % Gel Apply 2 g topically once daily.  Qty: 1 Tube, Refills: 3      diethylpropion 75 mg TbSR Take 75 mg by mouth once daily.  Qty: 30 tablet, Refills: 1      ergocalciferol (ERGOCALCIFEROL) 50,000 unit Cap One weekly for 8 weeks then 2,000 IU daily OTC  Qty: 8 capsule, Refills: 0      fluticasone (FLONASE) 50 mcg/actuation nasal spray 1 spray by  "Each Nare route 2 (two) times daily as needed for Rhinitis.  Qty: 15 g, Refills: 0      insulin detemir (LEVEMIR FLEXPEN) 100 unit/mL (3 mL) SubQ InPn pen Inject 30 Units into the skin every evening.  Qty: 1 Box, Refills: 6    Associated Diagnoses: Uncontrolled type 2 diabetes mellitus without complication, with long-term current use of insulin      insulin lispro (HUMALOG KWIKPEN) 100 unit/mL InPn pen 11 Units before meals plus sliding scale  Qty: 1 Box, Refills: 6      lisinopril (PRINIVIL,ZESTRIL) 2.5 MG tablet One daily for proteinuria.  Qty: 30 tablet, Refills: 6    Associated Diagnoses: Proteinuria, unspecified type      metFORMIN (GLUCOPHAGE-XR) 500 MG 24 hr tablet Take 2 tablets (1,000 mg total) by mouth 2 (two) times daily.  Qty: 360 tablet, Refills: 6    Associated Diagnoses: Uncontrolled type 2 diabetes mellitus without complication, with long-term current use of insulin      omeprazole (PRILOSEC) 20 MG capsule Take 1 capsule (20 mg total) by mouth every morning.  Qty: 30 capsule, Refills: 6    Associated Diagnoses: Gastroesophageal reflux disease without esophagitis      oxyCODONE-acetaminophen (PERCOCET)  mg per tablet Take 1 tablet by mouth every 6 (six) hours as needed for Pain.  Qty: 120 tablet, Refills: 0    Associated Diagnoses: Spondylosis of lumbar region without myelopathy or radiculopathy; Facet arthritis of lumbar region; DDD (degenerative disc disease), lumbar; Primary osteoarthritis of both knees; Bilateral chronic knee pain; Status post total right knee replacement; Chronic pain disorder; Encounter for long-term opiate analgesic use      pen needle, diabetic 33 gauge x 5/32" Ndle 1 application by Misc.(Non-Drug; Combo Route) route 4 (four) times daily with meals and nightly.  Qty: 100 each, Refills: 6      VENTOLIN HFA 90 mcg/actuation inhaler USE 2 PUFFS EVERY 4 TO 6 HOURS AS NEEDED FOR SHORTNESS OF BREATH OR WHEEZE  Qty: 18 g, Refills: 10    Associated Diagnoses: Asthma, well " controlled, mild intermittent      vitamin D 185 MG Tab Take 5,000 mg by mouth once daily.       !! - Potential duplicate medications found. Please discuss with provider.              Discharge Diagnosis: Facet arthritis of lumbar region [M46.96]  Condition on Discharge: Stable with no complications to procedure   Diet on Discharge: Same as before.  Activity: as per instruction sheet.  Discharge to: Home with a responsible adult.  Follow up: 2-4 weeks

## 2018-07-11 ENCOUNTER — OFFICE VISIT (OUTPATIENT)
Dept: PODIATRY | Facility: CLINIC | Age: 53
End: 2018-07-11
Payer: MEDICARE

## 2018-07-11 VITALS
DIASTOLIC BLOOD PRESSURE: 84 MMHG | HEIGHT: 65 IN | BODY MASS INDEX: 48.82 KG/M2 | HEART RATE: 84 BPM | SYSTOLIC BLOOD PRESSURE: 124 MMHG | WEIGHT: 293 LBS

## 2018-07-11 DIAGNOSIS — E11.49 TYPE II DIABETES MELLITUS WITH NEUROLOGICAL MANIFESTATIONS: Primary | ICD-10-CM

## 2018-07-11 DIAGNOSIS — B35.1 DERMATOPHYTOSIS OF NAIL: ICD-10-CM

## 2018-07-11 PROCEDURE — 99213 OFFICE O/P EST LOW 20 MIN: CPT | Mod: PBBFAC | Performed by: PODIATRIST

## 2018-07-11 PROCEDURE — 99999 PR PBB SHADOW E&M-EST. PATIENT-LVL III: CPT | Mod: PBBFAC,,, | Performed by: PODIATRIST

## 2018-07-11 PROCEDURE — 99499 UNLISTED E&M SERVICE: CPT | Mod: S$PBB,,, | Performed by: PODIATRIST

## 2018-07-11 PROCEDURE — 11721 DEBRIDE NAIL 6 OR MORE: CPT | Mod: Q9,PBBFAC | Performed by: PODIATRIST

## 2018-07-11 NOTE — PATIENT INSTRUCTIONS
Your a1c    Hemoglobin A1C   Date Value Ref Range Status   05/17/2018 7.6 (H) 4.0 - 5.6 % Final     Comment:     According to ADA guidelines, hemoglobin A1c <7.0% represents  optimal control in non-pregnant diabetic patients. Different  metrics may apply to specific patient populations.   Standards of Medical Care in Diabetes-2016.  For the purpose of screening for the presence of diabetes:  <5.7%     Consistent with the absence of diabetes  5.7-6.4%  Consistent with increasing risk for diabetes   (prediabetes)  >or=6.5%  Consistent with diabetes  Currently, no consensus exists for use of hemoglobin A1c  for diagnosis of diabetes for children.  This Hemoglobin A1c assay has significant interference with fetal   hemoglobin   (HbF). The results are invalid for patients with abnormal amounts of   HbF,   including those with known Hereditary Persistence   of Fetal Hemoglobin. Heterozygous hemoglobin variants (HbAS, HbAC,   HbAD, HbAE, HbA2) do not significantly interfere with this assay;   however, presence of multiple variants in a sample may impact the %   interference.     03/19/2018 7.6 (H) 4.0 - 5.6 % Final     Comment:     According to ADA guidelines, hemoglobin A1c <7.0% represents  optimal control in non-pregnant diabetic patients. Different  metrics may apply to specific patient populations.   Standards of Medical Care in Diabetes-2016.  For the purpose of screening for the presence of diabetes:  <5.7%     Consistent with the absence of diabetes  5.7-6.4%  Consistent with increasing risk for diabetes   (prediabetes)  >or=6.5%  Consistent with diabetes  Currently, no consensus exists for use of hemoglobin A1c  for diagnosis of diabetes for children.  This Hemoglobin A1c assay has significant interference with fetal   hemoglobin   (HbF). The results are invalid for patients with abnormal amounts of   HbF,   including those with known Hereditary Persistence   of Fetal Hemoglobin. Heterozygous hemoglobin variants  (HbAS, HbAC,   HbAD, HbAE, HbA2) do not significantly interfere with this assay;   however, presence of multiple variants in a sample may impact the %   interference.     12/18/2017 7.4 (H) 4.0 - 5.6 % Final     Comment:     According to ADA guidelines, hemoglobin A1c <7.0% represents  optimal control in non-pregnant diabetic patients. Different  metrics may apply to specific patient populations.   Standards of Medical Care in Diabetes-2016.  For the purpose of screening for the presence of diabetes:  <5.7%     Consistent with the absence of diabetes  5.7-6.4%  Consistent with increasing risk for diabetes   (prediabetes)  >or=6.5%  Consistent with diabetes  Currently, no consensus exists for use of hemoglobin A1c  for diagnosis of diabetes for children.  This Hemoglobin A1c assay has significant interference with fetal   hemoglobin   (HbF). The results are invalid for patients with abnormal amounts of   HbF,   including those with known Hereditary Persistence   of Fetal Hemoglobin. Heterozygous hemoglobin variants (HbAS, HbAC,   HbAD, HbAE, HbA2) do not significantly interfere with this assay;   however, presence of multiple variants in a sample may impact the %   interference.         How to Check Your Feet    Below are tips to help you look for foot problems. Try to check your feet at the same time each day, such as when you get out of bed in the morning.    · Check the top of each foot. The tops of toes, back of the heel, and outer edge of the foot can get a lot of rubbing from poor-fitting shoes.    · Check the bottom of each foot. Daily wear and tear often leads to problems at pressure spots.    · Check the toes and nails. Fungal infections often occur between toes. Toenail problems can also be a sign of fungal infections or lead to breaks in the skin.    · Check your shoes, too. Loose objects inside a shoe can injure the foot. Use your hand to feel inside your shoes for things like alie, loose stitching, or  rough areas that could irritate your skin.        Diabetic Foot Care    Diabetes can lead to a number of different foot complications. Fortunately, most of these complications can be prevented with a little extra foot care. If diabetes is not well controlled, the high blood sugar can cause damage to blood vessels and result in poor circulation to the foot. When the skin does not get enough blood flow, it becomes prone to pressure sores and ulcers, which heal slowly.  High blood sugar can also damage nerves, interfering with the ability to feel pain and pressure. When you cant feel your foot normally, it is easy to injure your skin, bones and joints without knowing it. For these reasons diabetes increases the risk of fungal infections, bunions and ulcers. Deep ulcers can lead to bone infection. Gangrene is the most serious foot complication of diabetes. It usually occurs on the tips of the toes as blacked areas of skin. The black area is dead tissue. In severe cases, gangrene spreads to involve the entire toe, other toes and the entire foot. Foot or toe amputation may be required. Good foot care and blood sugar control can prevent this.    Home Care  1. Wear comfortable, proper fitting shoes.  2. Wash your feet daily with warm water and mild soap.  3. After drying, apply a moisturizing cream or lotion.  4. Check your feet daily for skin breaks, blisters, swelling, or redness. Look between your toes also.  5. Wear cotton socks and change them every day.  6. Trim toe nails carefully and do not cut your cuticles.  7. Strive to keep your blood sugar under control with a combination of medicines, diet and activity.  8. If you smoke and have diabetes, it is very important that you stop. Smoking reduces blood flow to your foot.  9. Avoid activities that increase your risk of foot injury:  · Do not walk barefoot.  · Do not use heating pads or hot water bottles on your feet.  · Do not put your foot in a hot tub without first  checking the temperature with your hand.  10) Schedule yearly foot exams.    Follow Up  with your doctor or as advised by our staff. Report any cut, puncture, scrape, other injury, blister, ingrown toenail or ulcer on your foot.    Get Prompt Medical Attention  if any of the following occur:  -- Open ulcer with pus draining from the wound  -- Increasing foot or leg pain  -- New areas of redness or swelling or tender areas of the foot    © 5266-6757 [employer]. [address] [place] [address]. All rights reserved. This information is not intended as a substitute for professional medical care. Always follow your healthcare professional's instructions.

## 2018-07-11 NOTE — PROGRESS NOTES
Chief Complaint   Patient presents with    Diabetes Mellitus     bilateral pcp 6/28/18 Dr Herndon    Diabetic Foot Exam           HPI:   The patient is a 52 y.o.  female  who presents for a diabetic foot exam.   Patient reports + presence of abnormal sensation to the feet, just sometimes.     Patient has no history of wounds on the feet.   Hx of foot surgery: none.   Shoe gear: flat lace up canvas   This patient has documented high risk feet requiring routine maintenance secondary to diabetes.  Main concern today is toenail debridement.        Primary care doctor is: Clarisse Richardson MD  Patient last saw primary care doctor on:   6/28/18  Chief Complaint   Patient presents with    Diabetes Mellitus     bilateral pcp 6/28/18 Dr Herndon    Diabetic Foot Exam              Past Medical History:   Diagnosis Date    Asthma     Diabetes mellitus type II     DJD (degenerative joint disease) of knee 6/19/2014    Heartburn     Hyperlipidemia     Morbid obesity     Sleep apnea          Current Outpatient Prescriptions on File Prior to Visit   Medication Sig Dispense Refill    aspirin (ECOTRIN) 81 MG EC tablet Take 1 tablet by mouth every morning. prevents heart attacks and strokes      atorvastatin (LIPITOR) 20 MG tablet Take 1 tablet (20 mg total) by mouth once daily. 30 tablet 6    blood sugar diagnostic Strp Freestyle light strips and lancets 100 each 6    blood sugar diagnostic Strp Check glucose four times daily Strips and lancets covered by insurance E11.9 - One touch 120 each 6    blood-glucose meter (FREESTYLE SYSTEM KIT) kit Use as instructed 1 each 0    blood-glucose meter kit Check glucose four times daily E11.9 meter covered by insurance One Touch 1 each 0    celecoxib (CELEBREX) 200 MG capsule TAKE 1 CAPSULE BY MOUTH DAILY 30 capsule 1    diclofenac sodium (VOLTAREN) 1 % Gel Apply 2 g topically once daily. 1 Tube 3    diethylpropion 75 mg TbSR Take 75 mg by mouth once daily. 30  "tablet 1    ergocalciferol (ERGOCALCIFEROL) 50,000 unit Cap One weekly for 8 weeks then 2,000 IU daily OTC 8 capsule 0    fluticasone (FLONASE) 50 mcg/actuation nasal spray 1 spray by Each Nare route 2 (two) times daily as needed for Rhinitis. 15 g 0    insulin detemir (LEVEMIR FLEXPEN) 100 unit/mL (3 mL) SubQ InPn pen Inject 30 Units into the skin every evening. 1 Box 6    insulin lispro (HUMALOG KWIKPEN) 100 unit/mL InPn pen 11 Units before meals plus sliding scale 1 Box 6    lisinopril (PRINIVIL,ZESTRIL) 2.5 MG tablet One daily for proteinuria. 30 tablet 6    metFORMIN (GLUCOPHAGE-XR) 500 MG 24 hr tablet Take 2 tablets (1,000 mg total) by mouth 2 (two) times daily. 360 tablet 6    omeprazole (PRILOSEC) 20 MG capsule Take 1 capsule (20 mg total) by mouth every morning. 30 capsule 6    oxyCODONE-acetaminophen (PERCOCET)  mg per tablet Take 1 tablet by mouth every 6 (six) hours as needed for Pain. 120 tablet 0    pen needle, diabetic 33 gauge x 5/32" Ndle 1 application by Misc.(Non-Drug; Combo Route) route 4 (four) times daily with meals and nightly. 100 each 6    VENTOLIN HFA 90 mcg/actuation inhaler USE 2 PUFFS EVERY 4 TO 6 HOURS AS NEEDED FOR SHORTNESS OF BREATH OR WHEEZE 18 g 10    vitamin D 185 MG Tab Take 5,000 mg by mouth once daily.       Current Facility-Administered Medications on File Prior to Visit   Medication Dose Route Frequency Provider Last Rate Last Dose    [DISCONTINUED] 0.9%  NaCl infusion   Intravenous Continuous Lina Wagner MD 20 mL/hr at 07/10/18 1014      [DISCONTINUED] bupivacaine (PF) 0.25% (2.5 mg/ml) injection    PRN Lina Wagner MD   10 mL at 07/10/18 1047    [DISCONTINUED] fentaNYL injection    PRN Lina Wagner MD   25 mcg at 07/10/18 1114    [DISCONTINUED] lidocaine HCL 10 mg/ml (1%) injection    PRN Lina Wagner MD   10 mL at 07/10/18 1047    [DISCONTINUED] midazolam (VERSED) 1 mg/mL injection    PRN Lina Wagner MD   2 mg at " 07/10/18 1050       Review of patient's allergies indicates:  No Known Allergies          Social History     Social History    Marital status: Single     Spouse name: N/A    Number of children: N/A    Years of education: N/A     Occupational History    Not on file.     Social History Main Topics    Smoking status: Never Smoker    Smokeless tobacco: Never Used    Alcohol use Yes      Comment: occasionally     Drug use: No    Sexual activity: Yes     Partners: Female     Birth control/ protection: None     Other Topics Concern    Not on file     Social History Narrative    Disabled. The patient is the youngest of 6 siblings. Single. Lives with single-sex partner.                            ROS:  General ROS: negative  Respiratory ROS: no cough, shortness of breath, or wheezing  Cardiovascular ROS: no chest pain or dyspnea on exertion  Musculoskeletal ROS: negative  Neurological ROS:   negative for - gait disturbance, numbness/tingling, seizures or tremors  Dermatological ROS: negative          LAST HbA1c:   Hemoglobin A1C   Date Value Ref Range Status   05/17/2018 7.6 (H) 4.0 - 5.6 % Final     Comment:     According to ADA guidelines, hemoglobin A1c <7.0% represents  optimal control in non-pregnant diabetic patients. Different  metrics may apply to specific patient populations.   Standards of Medical Care in Diabetes-2016.  For the purpose of screening for the presence of diabetes:  <5.7%     Consistent with the absence of diabetes  5.7-6.4%  Consistent with increasing risk for diabetes   (prediabetes)  >or=6.5%  Consistent with diabetes  Currently, no consensus exists for use of hemoglobin A1c  for diagnosis of diabetes for children.  This Hemoglobin A1c assay has significant interference with fetal   hemoglobin   (HbF). The results are invalid for patients with abnormal amounts of   HbF,   including those with known Hereditary Persistence   of Fetal Hemoglobin. Heterozygous hemoglobin variants (HbAS, HbAC,    HbAD, HbAE, HbA2) do not significantly interfere with this assay;   however, presence of multiple variants in a sample may impact the %   interference.     03/19/2018 7.6 (H) 4.0 - 5.6 % Final     Comment:     According to ADA guidelines, hemoglobin A1c <7.0% represents  optimal control in non-pregnant diabetic patients. Different  metrics may apply to specific patient populations.   Standards of Medical Care in Diabetes-2016.  For the purpose of screening for the presence of diabetes:  <5.7%     Consistent with the absence of diabetes  5.7-6.4%  Consistent with increasing risk for diabetes   (prediabetes)  >or=6.5%  Consistent with diabetes  Currently, no consensus exists for use of hemoglobin A1c  for diagnosis of diabetes for children.  This Hemoglobin A1c assay has significant interference with fetal   hemoglobin   (HbF). The results are invalid for patients with abnormal amounts of   HbF,   including those with known Hereditary Persistence   of Fetal Hemoglobin. Heterozygous hemoglobin variants (HbAS, HbAC,   HbAD, HbAE, HbA2) do not significantly interfere with this assay;   however, presence of multiple variants in a sample may impact the %   interference.     12/18/2017 7.4 (H) 4.0 - 5.6 % Final     Comment:     According to ADA guidelines, hemoglobin A1c <7.0% represents  optimal control in non-pregnant diabetic patients. Different  metrics may apply to specific patient populations.   Standards of Medical Care in Diabetes-2016.  For the purpose of screening for the presence of diabetes:  <5.7%     Consistent with the absence of diabetes  5.7-6.4%  Consistent with increasing risk for diabetes   (prediabetes)  >or=6.5%  Consistent with diabetes  Currently, no consensus exists for use of hemoglobin A1c  for diagnosis of diabetes for children.  This Hemoglobin A1c assay has significant interference with fetal   hemoglobin   (HbF). The results are invalid for patients with abnormal amounts of   HbF,  "  including those with known Hereditary Persistence   of Fetal Hemoglobin. Heterozygous hemoglobin variants (HbAS, HbAC,   HbAD, HbAE, HbA2) do not significantly interfere with this assay;   however, presence of multiple variants in a sample may impact the %   interference.           EXAM:   Vitals:    07/11/18 0722   BP: 124/84   Pulse: 84   Weight: (!) 166.9 kg (368 lb)   Height: 5' 5" (1.651 m)       General: Pt. is well-developed, well-nourished, appears stated age, in no acute distress, alert and oriented x 3.      Vascular: Dorsalis pedis and posterior tibial pulses are 2/4 bilaterally. 3 secs capillary refill time and toes are warm to touch.  There is reduced hair growth on the feet.  There is 1+ edema.  no varicosities.      Neurological:  Light touch, proprioception, and sharp/dull sensation are all intact bilaterally. Protective threshold with the [place] monofilament is diminished bilaterally.     Dermatological:  The skin is intact, warm.  The toenails  1-5 L and 1-5 R  are greenish- yellow, long by 5-6mm and thick by 2-3mm with subungual debris  .   There are no open wounds. There is no ecchymoses.  no erythema noted.   Skin diffusely dry on the soles     Interdigital spaces are intact, no fissures    Skin atrophic, thin, dry and mildly hyperpigmented.     Musculoskeletal:  Muscle strength is 5/5 in all groups bilaterally.  Metatarsophalangeal, subtalar, and ankle range of motion are intact without crepitus.           Assessment / Plan:    Problem List Items Addressed This Visit     None      Visit Diagnoses     Type II diabetes mellitus with neurological manifestations    -  Primary    Relevant Orders    Foot Care    Dermatophytosis of nail        Relevant Orders    Foot Care          I counseled the patient on the patient's conditions, their implications and medical management.     Shoe inspection. Diabetic Foot Education. Patient reminded of the importance of good nutrition and blood sugar " control to help prevent podiatric complications of diabetes. Patient instructed on proper foot hygiene. We discussed wearing proper shoe gear, daily foot inspections, never walking without protective shoe gear, never putting sharp instruments to feet, and routine diabetic foot exam and care every 3-6 months to prevent complications.          Routine Foot Care  Date/Time: 7/11/2018 7:52 AM  Performed by: JOSÉ ANTONIO WILLIS  Authorized by: JOSÉ ANTONIO WILLIS     Consent Done?:  Yes (Verbal)    Nail Care Type:  Debride  Location(s): All  (Left 1st Toe, Left 3rd Toe, Left 2nd Toe, Left 4th Toe, Left 5th Toe, Right 1st Toe, Right 2nd Toe, Right 3rd Toe, Right 4th Toe and Right 5th Toe)  Patient tolerance:  Patient tolerated the procedure well with no immediate complications     With patient's permission, the toenails mentioned above were aggressively reduced and debrided using a nail nipper, removing all offending nail and debris. The patient will continue to monitor the areas daily, inspect the feet, wear protective shoe gear when ambulatory, and moisturizer to maintain skin integrity.          This patient has documented high risk feet requiring routine maintenance secondary to diabetes       follow up 3-4 months or sooner if concerned.

## 2018-07-26 ENCOUNTER — OFFICE VISIT (OUTPATIENT)
Dept: URGENT CARE | Facility: CLINIC | Age: 53
End: 2018-07-26
Payer: MEDICARE

## 2018-07-26 VITALS
BODY MASS INDEX: 48.82 KG/M2 | DIASTOLIC BLOOD PRESSURE: 87 MMHG | SYSTOLIC BLOOD PRESSURE: 143 MMHG | RESPIRATION RATE: 18 BRPM | OXYGEN SATURATION: 98 % | WEIGHT: 293 LBS | HEIGHT: 65 IN | TEMPERATURE: 99 F | HEART RATE: 70 BPM

## 2018-07-26 DIAGNOSIS — H66.90 OTITIS MEDIA, UNSPECIFIED LATERALITY, UNSPECIFIED OTITIS MEDIA TYPE: Primary | ICD-10-CM

## 2018-07-26 PROCEDURE — 96372 THER/PROPH/DIAG INJ SC/IM: CPT | Mod: S$GLB,,, | Performed by: EMERGENCY MEDICINE

## 2018-07-26 PROCEDURE — 99214 OFFICE O/P EST MOD 30 MIN: CPT | Mod: 25,S$GLB,, | Performed by: EMERGENCY MEDICINE

## 2018-07-26 RX ORDER — CIPROFLOXACIN AND DEXAMETHASONE 3; 1 MG/ML; MG/ML
4 SUSPENSION/ DROPS AURICULAR (OTIC) 2 TIMES DAILY
Qty: 10 ML | Refills: 0 | Status: SHIPPED | OUTPATIENT
Start: 2018-07-26 | End: 2018-08-02

## 2018-07-26 RX ORDER — KETOROLAC TROMETHAMINE 30 MG/ML
30 INJECTION, SOLUTION INTRAMUSCULAR; INTRAVENOUS
Status: COMPLETED | OUTPATIENT
Start: 2018-07-26 | End: 2018-07-26

## 2018-07-26 RX ADMIN — KETOROLAC TROMETHAMINE 30 MG: 30 INJECTION, SOLUTION INTRAMUSCULAR; INTRAVENOUS at 11:07

## 2018-07-26 NOTE — PATIENT INSTRUCTIONS
Go to the Emergency Room if symptoms or condition worsens in any way.    Follow up with Primary Care or ENT if not improved in 7-10 Days:  [phone]      External Ear Infection (Adult)    External otitis (also called swimmers ear) is an infection in the ear canal. It is often caused by bacteria or fungus. It can occur a few days after water gets trapped in the ear canal (from swimming or bathing). It can also occur after cleaning too deeply in the ear canal with a cotton swab or other object. Sometimes, hair care products get into the ear canal and cause this problem.  Symptoms can include pain, fever, itching, redness, drainage, or swelling of the ear canal. Temporary hearing loss may also occur.  Home care  · Do not try to clean the ear canal. This can push pus and bacteria deeper into the canal.  · Use prescribed ear drops as directed. These help reduce swelling and fight the infection. If an ear wick was placed in the ear canal, apply drops right onto the end of the wick. The wick will draw the medication into the ear canal even if it is swollen closed.  · A cotton ball may be loosely placed in the outer ear to absorb any drainage.  · You may use acetaminophen or ibuprofen to control pain, unless another medication was prescribed. Note: If you have chronic liver or kidney disease or ever had a stomach ulcer or GI bleeding, talk to your health care provider before taking any of these medications.  · Do not allow water to get into your ear when bathing. Also, avoid swimming until the infection has cleared.  Prevention  · Keep your ears dry. This helps lower the risk of infection. Dry your ears with a towel or hair dryer after getting wet. Also, use ear plugs when swimming.  · Do not stick any objects in the ear to remove wax.  · If you feel water trapped in your ear, use ear drops right away. You can get these drops over the counter at most drugstores. They work by removing water from the ear canal.  Follow-up  care  Follow up with your health care provider in one week, or as advised.  When to seek medical advice  Call your health care provider right away if any of these occur:  · Ear pain becomes worse or doesnt improve after 3 days of treatment  · Redness or swelling of the outer ear occurs or gets worse  · Headache  · Painful or stiff neck  · Drowsiness or confusion  · Fever of 100.4ºF (38ºC) or higher, or as directed by your health care provider  · Seizure  Date Last Reviewed: 3/22/2015  © 7021-3054 [employer]. [address] [place] [address]. All rights reserved. This information is not intended as a substitute for professional medical care. Always follow your healthcare professional's instructions.

## 2018-07-26 NOTE — PROGRESS NOTES
"Subjective:       Patient ID: Alivia Thomas is a 52 y.o. female.    Vitals:    07/26/18 1127   BP: (!) 143/87   Pulse: 70   Resp: 18   Temp: 98.6 °F (37 °C)   SpO2: 98%   Weight: (!) 167.4 kg (369 lb)   Height: 5' 5" (1.651 m)       Chief Complaint: Otalgia (3 days now RT EAR )    Otalgia    There is pain in the right ear. This is a new problem. The current episode started in the past 7 days. The problem has been gradually worsening. There has been no fever. The pain is at a severity of 8/10. The pain is severe. Pertinent negatives include no abdominal pain, coughing, headaches or sore throat. She has tried nothing for the symptoms. There is no history of a chronic ear infection or hearing loss.     Review of Systems   Constitution: Negative for chills, fever and malaise/fatigue.   HENT: Positive for ear pain. Negative for congestion, hoarse voice and sore throat.    Eyes: Negative for discharge and redness.   Cardiovascular: Negative for chest pain, dyspnea on exertion and leg swelling.   Respiratory: Negative for cough, shortness of breath, sputum production and wheezing.    Musculoskeletal: Negative for myalgias.   Gastrointestinal: Negative for abdominal pain and nausea.   Neurological: Negative for headaches.       Objective:      Physical Exam   Constitutional: She is oriented to person, place, and time. She appears well-developed and well-nourished. She is cooperative.  Non-toxic appearance. She does not appear ill. No distress.   obese   HENT:   Head: Normocephalic and atraumatic.   Right Ear: Hearing, tympanic membrane and external ear normal. There is swelling and tenderness.   Left Ear: Hearing, tympanic membrane, external ear and ear canal normal.   Nose: Nose normal. No mucosal edema, rhinorrhea or nasal deformity. No epistaxis. Right sinus exhibits no maxillary sinus tenderness and no frontal sinus tenderness. Left sinus exhibits no maxillary sinus tenderness and no frontal sinus tenderness. "   Mouth/Throat: Uvula is midline, oropharynx is clear and moist and mucous membranes are normal. No trismus in the jaw. Normal dentition. No uvula swelling. No posterior oropharyngeal erythema.   Right otitis externa   Eyes: Conjunctivae and lids are normal. No scleral icterus.   Sclera clear bilat   Neck: Trachea normal, full passive range of motion without pain and phonation normal. Neck supple.   Cardiovascular: Normal rate, regular rhythm, normal heart sounds, intact distal pulses and normal pulses.    Pulmonary/Chest: Effort normal and breath sounds normal. No respiratory distress.   Abdominal: Soft. Normal appearance and bowel sounds are normal. She exhibits no distension. There is no tenderness.   Musculoskeletal: Normal range of motion. She exhibits no edema or deformity.   Neurological: She is alert and oriented to person, place, and time. She exhibits normal muscle tone. Coordination normal.   Skin: Skin is warm, dry and intact. She is not diaphoretic. No pallor.   Psychiatric: She has a normal mood and affect. Her speech is normal and behavior is normal. Judgment and thought content normal. Cognition and memory are normal.   Nursing note and vitals reviewed.      Assessment:       1. Otitis media, unspecified laterality, unspecified otitis media type        Plan:       Alivia was seen today for otalgia.    Diagnoses and all orders for this visit:    Otitis media, unspecified laterality, unspecified otitis media type    Other orders  -     ciprofloxacin-dexamethasone 0.3-0.1% (CIPRODEX) 0.3-0.1 % DrpS; Place 4 drops into the right ear 2 (two) times daily. for 7 days  -     ketorolac injection 30 mg; Inject 1 mL (30 mg total) into the muscle one time.          Patient Instructions   Go to the Emergency Room if symptoms or condition worsens in any way.    Follow up with Primary Care or ENT if not improved in 7-10 Days:  [id number]      External Ear Infection (Adult)    External otitis (also called swimmers  ear) is an infection in the ear canal. It is often caused by bacteria or fungus. It can occur a few days after water gets trapped in the ear canal (from swimming or bathing). It can also occur after cleaning too deeply in the ear canal with a cotton swab or other object. Sometimes, hair care products get into the ear canal and cause this problem.  Symptoms can include pain, fever, itching, redness, drainage, or swelling of the ear canal. Temporary hearing loss may also occur.  Home care  · Do not try to clean the ear canal. This can push pus and bacteria deeper into the canal.  · Use prescribed ear drops as directed. These help reduce swelling and fight the infection. If an ear wick was placed in the ear canal, apply drops right onto the end of the wick. The wick will draw the medication into the ear canal even if it is swollen closed.  · A cotton ball may be loosely placed in the outer ear to absorb any drainage.  · You may use acetaminophen or ibuprofen to control pain, unless another medication was prescribed. Note: If you have chronic liver or kidney disease or ever had a stomach ulcer or GI bleeding, talk to your health care provider before taking any of these medications.  · Do not allow water to get into your ear when bathing. Also, avoid swimming until the infection has cleared.  Prevention  · Keep your ears dry. This helps lower the risk of infection. Dry your ears with a towel or hair dryer after getting wet. Also, use ear plugs when swimming.  · Do not stick any objects in the ear to remove wax.  · If you feel water trapped in your ear, use ear drops right away. You can get these drops over the counter at most drugstores. They work by removing water from the ear canal.  Follow-up care  Follow up with your health care provider in one week, or as advised.  When to seek medical advice  Call your health care provider right away if any of these occur:  · Ear pain becomes worse or doesnt improve after 3 days  of treatment  · Redness or swelling of the outer ear occurs or gets worse  · Headache  · Painful or stiff neck  · Drowsiness or confusion  · Fever of 100.4ºF (38ºC) or higher, or as directed by your health care provider  · Seizure  Date Last Reviewed: 3/22/2015  © 3783-3296 [employer]. [address] [place] [address], [place] [address]. All rights reserved. This information is not intended as a substitute for professional medical care. Always follow your healthcare professional's instructions.

## 2018-07-27 ENCOUNTER — HOSPITAL ENCOUNTER (EMERGENCY)
Facility: HOSPITAL | Age: 53
Discharge: HOME | End: 2018-07-28
Attending: EMERGENCY MEDICINE
Payer: MEDICARE

## 2018-07-27 VITALS
SYSTOLIC BLOOD PRESSURE: 142 MMHG | RESPIRATION RATE: 20 BRPM | HEART RATE: 85 BPM | DIASTOLIC BLOOD PRESSURE: 78 MMHG | OXYGEN SATURATION: 97 % | TEMPERATURE: 99 F

## 2018-07-27 DIAGNOSIS — H60.501 ACUTE OTITIS EXTERNA OF RIGHT EAR, UNSPECIFIED TYPE: Primary | ICD-10-CM

## 2018-07-27 PROCEDURE — 99283 EMERGENCY DEPT VISIT LOW MDM: CPT | Mod: ,,, | Performed by: NURSE PRACTITIONER

## 2018-07-27 PROCEDURE — 96372 THER/PROPH/DIAG INJ SC/IM: CPT

## 2018-07-27 PROCEDURE — 99283 EMERGENCY DEPT VISIT LOW MDM: CPT | Mod: 25

## 2018-07-27 RX ORDER — KETOROLAC TROMETHAMINE 30 MG/ML
15 INJECTION, SOLUTION INTRAMUSCULAR; INTRAVENOUS
Status: COMPLETED | OUTPATIENT
Start: 2018-07-28 | End: 2018-07-28

## 2018-07-28 PROCEDURE — 63600175 PHARM REV CODE 636 W HCPCS: Performed by: NURSE PRACTITIONER

## 2018-07-28 RX ORDER — IBUPROFEN 800 MG/1
800 TABLET ORAL EVERY 8 HOURS PRN
Qty: 12 TABLET | Refills: 0 | Status: SHIPPED | OUTPATIENT
Start: 2018-07-28 | End: 2018-08-01

## 2018-07-28 RX ADMIN — KETOROLAC TROMETHAMINE: 30 INJECTION, SOLUTION INTRAMUSCULAR at 12:07

## 2018-07-28 NOTE — ED TRIAGE NOTES
Alivia Thomas, a 52 y.o. female presents to the ED      Chief Complaint   Patient presents with    Otalgia     right ear pain x 3 days. pt states her throat and right side of her face is in pain 9/10. pt stes she is nauseated . pt is aox 4.      Review of patient's allergies indicates:   Allergen Reactions    [place]      Past Medical History:   Diagnosis Date    Asthma     Diabetes mellitus type II     DJD (degenerative joint disease) of knee 6/19/2014    Heartburn     Hyperlipidemia     Morbid obesity     Sleep apnea

## 2018-07-28 NOTE — ED PROVIDER NOTES
Encounter Date: 7/27/2018       History     Chief Complaint   Patient presents with    Otalgia     right ear pain x 3 days. pt states her throat and right side of her face is in pain 9/10. pt stes she is nauseated . pt is aox 4.      The patient is a 52-year-old female with medical history of asthma, diabetes mellitus, morbid obesity, hyperlipidemia and DJD presenting to the ED for right ear pain.  Patient was seen at recent urgent care and diagnosed with otitis media 2 days ago.  Patient was started on ciprofloxacin ear drops.  Patient states since that time pain has not been relieved.  Patient states right-sided jaw pain.  Patient denies any fever, chills, nausea, vomiting or diarrhea.          Review of patient's allergies indicates:   Allergen Reactions    [place]      Past Medical History:   Diagnosis Date    Asthma     Diabetes mellitus type II     DJD (degenerative joint disease) of knee 6/19/2014    Heartburn     Hyperlipidemia     Morbid obesity     Sleep apnea      Past Surgical History:   Procedure Laterality Date    CARPAL TUNNEL RELEASE      CARPAL TUNNEL RELEASE  1980s    left    CARPAL TUNNEL RELEASE  2012    right    JOINT REPLACEMENT Bilateral     with 2 revisions on rt    KNEE SURGERY  3/2010    orthroscope    KNEE SURGERY  6-19-14    left TKR    RADIOFREQUENCY ABLATION OF LUMBAR MEDIAL BRANCH NERVE AT SINGLE LEVEL Left 6/26/2018    Procedure: RADIOFREQUENCY ABLATION, NERVE, MEDIAL BRANCH, LUMBAR, 1 LEVEL;  Surgeon: Lina Wagner MD;  Location: [facility];  Service: Pain Management;  Laterality: Left;  Left Cooled RFA @ L2,3,4,5  [id number]  with Sedation    1 of 2    RADIOFREQUENCY ABLATION OF LUMBAR MEDIAL BRANCH NERVE AT SINGLE LEVEL Right 7/10/2018    Procedure: RADIOFREQUENCY ABLATION, NERVE, MEDIAL BRANCH, LUMBAR, 1 LEVEL;  Surgeon: Lina Wagner MD;  Location: [facility];  Service: Pain Management;  Laterality: Right;  RIght Cooled RFA @  L2,3,4,5  [id number] with Sedation    2 of 2     Family History   Problem Relation Age of Onset    Diabetes Mother     Hypertension Mother     Cataracts Mother     Diabetes Father     Cataracts Father     Coronary artery disease Brother     Amblyopia Neg Hx     Blindness Neg Hx     Cancer Neg Hx     Glaucoma Neg Hx     Macular degeneration Neg Hx     Retinal detachment Neg Hx     Strabismus Neg Hx     Stroke Neg Hx     Thyroid disease Neg Hx      Social History   Substance Use Topics    Smoking status: Never Smoker    Smokeless tobacco: Never Used    Alcohol use Yes      Comment: occasionally      Review of Systems   Constitutional: Negative for chills and fever.   HENT: Positive for ear pain ( right sided). Negative for sore throat.    Respiratory: Negative for shortness of breath.    Cardiovascular: Negative for chest pain.   Gastrointestinal: Negative for nausea.   Genitourinary: Negative for dysuria.   Musculoskeletal: Negative for back pain.   Skin: Negative for rash.   Neurological: Negative for weakness.   Hematological: Does not bruise/bleed easily.       Physical Exam     Initial Vitals [07/27/18 2202]   BP Pulse Resp Temp SpO2   (!) 142/78 85 20 98.7 °F (37.1 °C) 97 %      MAP       --         Physical Exam    Nursing note and vitals reviewed.  Constitutional: She appears well-developed and well-nourished.   HENT:   Head: Normocephalic and atraumatic. Head is without abrasion and without contusion.   Right Ear: Hearing normal. There is swelling. Tympanic membrane mobility is abnormal.   Mouth/Throat: Uvula is midline, oropharynx is clear and moist and mucous membranes are normal.   No mastoiditis or dental abscess noted.    Eyes: Conjunctivae, EOM and lids are normal. Pupils are equal, round, and reactive to light.   Neck: Normal range of motion.   Cardiovascular: Normal rate, regular rhythm, normal heart sounds and intact distal pulses.   Pulmonary/Chest: Effort normal and breath sounds  normal.   Abdominal: Soft. Bowel sounds are normal.   Musculoskeletal: Normal range of motion.   Neurological: She is alert and oriented to person, place, and time. She has normal strength. No cranial nerve deficit or sensory deficit. GCS eye subscore is 4. GCS verbal subscore is 5. GCS motor subscore is 6.   Skin: Skin is warm and dry. Capillary refill takes less than 2 seconds. No abrasion, no laceration and no rash noted. No cyanosis. Nails show no clubbing.   Psychiatric: She has a normal mood and affect. Her speech is normal and behavior is normal. Judgment and thought content normal. Cognition and memory are normal.         ED Course   Procedures  Labs Reviewed - No data to display       Imaging Results    None                APC / Resident Notes:   Emergent evaluation of a 53 yo female patient presenting to the ER with chief complaint of right ear pain.  Patient recently seen at urgent care and diagnosed with otitis media.  Patient states since that time she has not had any increase in pain. Patient states she is not taking any medications for pain. On exam, pt the right ear swelling and tenderness.  TM with decreased movement.  No redness or swelling noted to jaw or face.  No mastoiditis noted. I will medicate and reassess.  Differential diagnoses include but are not limited to Otitis media, otitis externa, benign paroxysmal positional vertigo, mastoiditis, TMJ dysfunction, trauma, cholesteatoma, Tara Hunt syndrome, acoustic neuroma, trigeminal neuralgia, others. I discussed the care of this patient with my Supervising Physician.      Patient is hemodynamically stable, vital signs are normal. Discharge instructions given. Prescription for Ibuprofen given and explained. Return to ED precautions discussed. Follow up as directed. Pt verbalized understanding of this plan. Pt is stable for discharge.                    Clinical Impression:   The encounter diagnosis was Acute otitis externa of right ear,  unspecified type.      Disposition:   Disposition: Discharged  Condition: Stable                        Rocio Quinones NP  07/28/18 0129

## 2018-08-01 ENCOUNTER — OFFICE VISIT (OUTPATIENT)
Dept: PAIN MEDICINE | Facility: CLINIC | Age: 53
End: 2018-08-01
Payer: MEDICARE

## 2018-08-01 VITALS
WEIGHT: 293 LBS | SYSTOLIC BLOOD PRESSURE: 134 MMHG | RESPIRATION RATE: 18 BRPM | BODY MASS INDEX: 48.82 KG/M2 | HEIGHT: 65 IN | HEART RATE: 82 BPM | DIASTOLIC BLOOD PRESSURE: 91 MMHG | TEMPERATURE: 98 F

## 2018-08-01 DIAGNOSIS — G89.29 BILATERAL CHRONIC KNEE PAIN: ICD-10-CM

## 2018-08-01 DIAGNOSIS — M47.816 SPONDYLOSIS OF LUMBAR REGION WITHOUT MYELOPATHY OR RADICULOPATHY: ICD-10-CM

## 2018-08-01 DIAGNOSIS — M25.562 BILATERAL CHRONIC KNEE PAIN: ICD-10-CM

## 2018-08-01 DIAGNOSIS — Z96.651 STATUS POST TOTAL RIGHT KNEE REPLACEMENT: ICD-10-CM

## 2018-08-01 DIAGNOSIS — M51.36 DDD (DEGENERATIVE DISC DISEASE), LUMBAR: ICD-10-CM

## 2018-08-01 DIAGNOSIS — M47.816 FACET ARTHRITIS OF LUMBAR REGION: ICD-10-CM

## 2018-08-01 DIAGNOSIS — G89.4 CHRONIC PAIN DISORDER: ICD-10-CM

## 2018-08-01 DIAGNOSIS — Z79.891 ENCOUNTER FOR LONG-TERM OPIATE ANALGESIC USE: ICD-10-CM

## 2018-08-01 DIAGNOSIS — M47.816 LUMBAR SPONDYLOSIS: ICD-10-CM

## 2018-08-01 DIAGNOSIS — M17.0 PRIMARY OSTEOARTHRITIS OF BOTH KNEES: ICD-10-CM

## 2018-08-01 DIAGNOSIS — M47.816 FACET ARTHRITIS OF LUMBAR REGION: Primary | ICD-10-CM

## 2018-08-01 DIAGNOSIS — M54.12 CERVICAL RADICULOPATHY: ICD-10-CM

## 2018-08-01 DIAGNOSIS — M25.561 BILATERAL CHRONIC KNEE PAIN: ICD-10-CM

## 2018-08-01 PROCEDURE — 80307 DRUG TEST PRSMV CHEM ANLYZR: CPT

## 2018-08-01 PROCEDURE — 99214 OFFICE O/P EST MOD 30 MIN: CPT | Mod: S$PBB,,, | Performed by: NURSE PRACTITIONER

## 2018-08-01 PROCEDURE — 99213 OFFICE O/P EST LOW 20 MIN: CPT | Mod: PBBFAC | Performed by: NURSE PRACTITIONER

## 2018-08-01 PROCEDURE — 99999 PR PBB SHADOW E&M-EST. PATIENT-LVL III: CPT | Mod: PBBFAC,,, | Performed by: NURSE PRACTITIONER

## 2018-08-01 RX ORDER — OXYCODONE AND ACETAMINOPHEN 10; 325 MG/1; MG/1
1 TABLET ORAL EVERY 6 HOURS PRN
Qty: 120 TABLET | Refills: 0 | Status: SHIPPED | OUTPATIENT
Start: 2018-08-01 | End: 2018-08-29 | Stop reason: SDUPTHER

## 2018-08-01 NOTE — PROGRESS NOTES
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 osteophyte complex.  Mild right neuroforaminal narrowing.  Mild spinal canal stenosis.    C5-6: Facet arthropathy.  Uncovertebral joint spurring.  Broad-based posterior disc osteophyte complex.  Mild right neuroforaminal narrowing.  Mild spinal canal stenosis.    C6-7: Facet arthropathy.  No significant spinal canal stenosis or neuroforaminal narrowing.    C7-T1: No significant spinal canal stenosis or neuroforaminal narrowing.    Paraspinal muscles are unremarkable.  Soft tissues are intact.   Impression       Mild multilevel cervical spondylosis with mild spinal canal stenosis and mild right neuroforaminal narrowing at C4-5 and C5-6.       Lab Results   Component Value Date    HGBA1C 7.6 (H) 05/17/2018     Lab Results   Component Value Date    CHOL 152 05/17/2018    CHOL 191 05/09/2017    CHOL 200 (H) 12/15/2016     Lab Results   Component Value Date    HDL 50 05/17/2018    HDL 48 05/09/2017    HDL 40 12/15/2016     Lab Results   Component Value Date    LDLCALC 91.0 05/17/2018    LDLCALC 129.0 05/09/2017    LDLCALC 141.0 12/15/2016     Lab Results   Component Value Date    TRIG 55 05/17/2018    TRIG 70 05/09/2017    TRIG 95 12/15/2016     Lab Results   Component Value Date    CHOLHDL 32.9 05/17/2018    CHOLHDL 25.1 05/09/2017    CHOLHDL 20.0 12/15/2016         Past Medical History:   Diagnosis Date    Asthma     Diabetes mellitus type  II     DJD (degenerative joint disease) of knee 6/19/2014    Heartburn     Hyperlipidemia     Morbid obesity     Sleep apnea      Past Surgical History:   Procedure Laterality Date    CARPAL TUNNEL RELEASE      CARPAL TUNNEL RELEASE  1980s    left    CARPAL TUNNEL RELEASE  2012    right    JOINT REPLACEMENT Bilateral     with 2 revisions on rt    KNEE SURGERY  3/2010    orthroscope    KNEE SURGERY  6-19-14    left TKR    RADIOFREQUENCY ABLATION OF LUMBAR MEDIAL BRANCH NERVE AT SINGLE LEVEL Left 6/26/2018    Procedure: RADIOFREQUENCY ABLATION, NERVE, MEDIAL BRANCH, LUMBAR, 1 LEVEL;  Surgeon: Lina Wagner MD;  Location: [facility];  Service: Pain Management;  Laterality: Left;  Left Cooled RFA @ L2,3,4,5  [id number]  with Sedation    1 of 2    RADIOFREQUENCY ABLATION OF LUMBAR MEDIAL BRANCH NERVE AT SINGLE LEVEL Right 7/10/2018    Procedure: RADIOFREQUENCY ABLATION, NERVE, MEDIAL BRANCH, LUMBAR, 1 LEVEL;  Surgeon: Lina Wagner MD;  Location: [facility];  Service: Pain Management;  Laterality: Right;  RIght Cooled RFA @ L2,3,4,5  [id number] with Sedation    2 of 2     Family History   Problem Relation Age of Onset    Diabetes Mother     Hypertension Mother     Cataracts Mother     Diabetes Father     Cataracts Father     Coronary artery disease Brother     Amblyopia Neg Hx     Blindness Neg Hx     Cancer Neg Hx     Glaucoma Neg Hx     Macular degeneration Neg Hx     Retinal detachment Neg Hx     Strabismus Neg Hx     Stroke Neg Hx     Thyroid disease Neg Hx      Social History     Social History    Marital status: Single     Spouse name: N/A    Number of children: N/A    Years of education: N/A     Occupational History    Not on file.     Social History Main Topics    Smoking status: Never Smoker    Smokeless tobacco: Never Used    Alcohol use Yes      Comment: occasionally     Drug use: No    Sexual activity: Yes     Partners: Female     Birth control/  protection: None     Other Topics Concern    Not on file     Social History Narrative    Disabled. The patient is the youngest of 6 siblings. Single. Lives with single-sex partner.                      Review of patient's allergies indicates:  No Known Allergies    Medication List with Changes/Refills   Current Medications    ASPIRIN (ECOTRIN) 81 MG EC TABLET    Take 1 tablet by mouth every morning. prevents heart attacks and strokes    ATORVASTATIN (LIPITOR) 20 MG TABLET    Take 1 tablet (20 mg total) by mouth once daily.    BLOOD SUGAR DIAGNOSTIC STRP    Freestyle light strips and lancets    BLOOD SUGAR DIAGNOSTIC STRP    Check glucose four times daily Strips and lancets covered by insurance E11.9 - One touch    BLOOD-GLUCOSE METER (FREESTYLE SYSTEM KIT) KIT    Use as instructed    BLOOD-GLUCOSE METER KIT    Check glucose four times daily E11.9 meter covered by insurance One Touch    CELECOXIB (CELEBREX) 200 MG CAPSULE    TAKE 1 CAPSULE BY MOUTH DAILY    CIPROFLOXACIN-DEXAMETHASONE 0.3-0.1% (CIPRODEX) 0.3-0.1 % DRPS    Place 4 drops into the right ear 2 (two) times daily. for 7 days    DICLOFENAC SODIUM (VOLTAREN) 1 % GEL    Apply 2 g topically once daily.    DIETHYLPROPION 75 MG TBSR    Take 75 mg by mouth once daily.    ERGOCALCIFEROL (ERGOCALCIFEROL) 50,000 UNIT CAP    One weekly for 8 weeks then 2,000 IU daily OTC    FLUTICASONE (FLONASE) 50 MCG/ACTUATION NASAL SPRAY    1 spray by Each Nare route 2 (two) times daily as needed for Rhinitis.    IBUPROFEN (ADVIL,MOTRIN) 800 MG TABLET    Take 1 tablet (800 mg total) by mouth every 8 (eight) hours as needed for Pain.    INSULIN DETEMIR (LEVEMIR FLEXPEN) 100 UNIT/ML (3 ML) SUBQ INPN PEN    Inject 30 Units into the skin every evening.    INSULIN LISPRO (HUMALOG KWIKPEN) 100 UNIT/ML INPN PEN    11 Units before meals plus sliding scale    LISINOPRIL (PRINIVIL,ZESTRIL) 2.5 MG TABLET    One daily for proteinuria.    METFORMIN (GLUCOPHAGE-XR) 500 MG 24 HR TABLET    Take  "2 tablets (1,000 mg total) by mouth 2 (two) times daily.    OMEPRAZOLE (PRILOSEC) 20 MG CAPSULE    Take 1 capsule (20 mg total) by mouth every morning.    OXYCODONE-ACETAMINOPHEN (PERCOCET)  MG PER TABLET    Take 1 tablet by mouth every 6 (six) hours as needed for Pain.    PEN NEEDLE, DIABETIC 33 GAUGE X 5/32" NDLE    1 application by Misc.(Non-Drug; Combo Route) route 4 (four) times daily with meals and nightly.    VENTOLIN HFA 90 MCG/ACTUATION INHALER    USE 2 PUFFS EVERY 4 TO 6 HOURS AS NEEDED FOR SHORTNESS OF BREATH OR WHEEZE    VITAMIN D 185 MG TAB    Take 5,000 mg by mouth once daily.         REVIEW OF SYSTEMS:    GENERAL:  Patient is attempting to lose weight.  RESPIRATORY:  Negative for cough, wheezing or shortness of breath, patient denies any recent URI.  CARDIOVASCULAR:  Negative for chest pain, leg swelling or palpitations.  GI:  Negative for abdominal discomfort, blood in stools or black stools or change in bowel habits, occasional constipation.  MUSCULOSKELETAL:  See HPI.  SKIN:  Negative for lesions, rash, and itching.  PSYCH:  No mood disorder or recent psychosocial stressors.  Patient's sleep is disturbed secondary to pain (patient reports also that she has insomnia).  HEMATOLOGY/LYMPHOLOGY:  Negative for prolonged bleeding, bruising easily or swollen nodes.  81 mg aspirin  ENDO: Patient has a history of diabetes  NEURO:   No history of headaches, syncope, paralysis, seizures or tremors.  All other reviewed and negative other than HPI.    OBJECTIVE:    BP (!) 134/91   Pulse 82   Temp 98.1 °F (36.7 °C) (Oral)   Resp 18   Ht 5' 5" (1.651 m)   Wt (!) 170 kg (374 lb 12.5 oz)   BMI 62.37 kg/m²     PHYSICAL EXAMINATION:    GENERAL: Well appearing, in no acute distress, alert and oriented x3.   PSYCH:  Mood and affect appropriate.  SKIN: Skin color, texture, turgor normal, no rashes or lesions.  HEAD/FACE:  Normocephalic, atraumatic.   CV: RRR with palpation of the radial artery.  PULM: No " evidence of respiratory difficulty, symmetric chest rise.  NECK: There is pain with palpation over cervical paraspinal and trapezius muscles bilaterally.  There is limited ROM on extension and lateral rotation.  Positive facet loading bilaterally.  Spurling is negative bilaterally.  BACK: Negative SLR bilaterally. There is pain to palpation over the lumbar facet joints.  Limited ROM with pain on flexion.  Positive facet loading on the left.  EXTREMITIES: Pain with palpation to bilateral SI joints.  JENNA is negative bilaterally. Well-healed midline scars to bilateral knees.  Painful extension and flexion of bilateral knees. Medial and lateral joint line tenderness to bilateral knees.  MUSCULOSKELETAL: Bilateral upper and lower extremity strength is normal and symmetric.  No atrophy or tone abnormalities are noted.  NEURO: Bilateral lower extremity coordination and muscle stretch reflexes are physiologic and symmetric.  Plantar response are downgoing. No clonus.  No loss of sensation is noted.  GAIT: Antalgic- ambulating without assistance.       Assessment:       Encounter Diagnoses   Name Primary?    Facet arthritis of lumbar region Yes    Lumbar spondylosis     DDD (degenerative disc disease), lumbar     Bilateral chronic knee pain     Status post total right knee replacement     Cervical radiculopathy     Encounter for long-term opiate analgesic use          Plan:       - Previous imaging was reviewed and discussed with the patient today. We discussed recent cervical MRI in detail.  Previous labs reviewed today.     - We discussed C7/T1 IL KERI for cervical radiculopathy.  She will consider in the future.  She is currently on antibiotics for an ear infection.    - Continue to f/u with bariatrics for possible gastric sleeve surgery.      - Continue oxycodone-acetaminophen 10/325 one tablet every 6 hours PRN pain, #120, 0 refills.     - The patient is here today for a refill of current pain medications and  they believe these provide effective pain control and improvements in quality of life.  The patient notes no serious side effects, and feels the benefits outweigh the risks.  The patient was reminded of the pain contract that they signed previously as well as the risks and benefits of the medication including possible death.  The updated [place] Board  Pharmacy prescription monitoring program was reviewed, and the patient has been found to be compliant with current treatment plan. Medication management provided by Dr. Wagner.     - UDS from 2/6/2018 reviewed and consistent.  Repeat today.    - Continue topical pain cream PRN up to 5x/day.    - RTC in 1 month or sooner if needed.     - Dr. Wagner was consulted on the patient and agrees with this plan.      The above plan and management options were discussed at length with patient. Patient is in agreement with the above and verbalized understanding.     Virginia Sanchez, EMILY  08/01/2018

## 2018-08-06 LAB

## 2018-08-20 ENCOUNTER — LAB VISIT (OUTPATIENT)
Dept: LAB | Facility: HOSPITAL | Age: 53
End: 2018-08-20
Attending: INTERNAL MEDICINE
Payer: MEDICARE

## 2018-08-20 ENCOUNTER — TELEPHONE (OUTPATIENT)
Dept: INTERNAL MEDICINE | Facility: CLINIC | Age: 53
End: 2018-08-20

## 2018-08-20 DIAGNOSIS — E55.9 VITAMIN D DEFICIENCY: ICD-10-CM

## 2018-08-20 DIAGNOSIS — D64.9 ANEMIA, UNSPECIFIED TYPE: ICD-10-CM

## 2018-08-20 LAB
25(OH)D3+25(OH)D2 SERPL-MCNC: 15 NG/ML
ALBUMIN SERPL BCP-MCNC: 3.6 G/DL
ALP SERPL-CCNC: 62 U/L
ALT SERPL W/O P-5'-P-CCNC: 11 U/L
ANION GAP SERPL CALC-SCNC: 7 MMOL/L
AST SERPL-CCNC: 14 U/L
BASOPHILS # BLD AUTO: 0.04 K/UL
BASOPHILS NFR BLD: 0.6 %
BILIRUB SERPL-MCNC: 0.5 MG/DL
BUN SERPL-MCNC: 7 MG/DL
CALCIUM SERPL-MCNC: 9.7 MG/DL
CHLORIDE SERPL-SCNC: 105 MMOL/L
CO2 SERPL-SCNC: 25 MMOL/L
CREAT SERPL-MCNC: 0.7 MG/DL
DIFFERENTIAL METHOD: ABNORMAL
EOSINOPHIL # BLD AUTO: 0.1 K/UL
EOSINOPHIL NFR BLD: 2.1 %
ERYTHROCYTE [DISTWIDTH] IN BLOOD BY AUTOMATED COUNT: 14.8 %
EST. GFR  (AFRICAN AMERICAN): >60 ML/MIN/1.73 M^2
EST. GFR  (NON AFRICAN AMERICAN): >60 ML/MIN/1.73 M^2
ESTIMATED AVG GLUCOSE: 260 MG/DL
FERRITIN SERPL-MCNC: 10 NG/ML
GLUCOSE SERPL-MCNC: 274 MG/DL
HBA1C MFR BLD HPLC: 10.7 %
HCT VFR BLD AUTO: 37.4 %
HGB BLD-MCNC: 11.5 G/DL
IMM GRANULOCYTES # BLD AUTO: 0.01 K/UL
IMM GRANULOCYTES NFR BLD AUTO: 0.2 %
IRON SERPL-MCNC: 57 UG/DL
LYMPHOCYTES # BLD AUTO: 2.2 K/UL
LYMPHOCYTES NFR BLD: 35.8 %
MCH RBC QN AUTO: 26.8 PG
MCHC RBC AUTO-ENTMCNC: 30.7 G/DL
MCV RBC AUTO: 87 FL
MONOCYTES # BLD AUTO: 0.6 K/UL
MONOCYTES NFR BLD: 9.2 %
NEUTROPHILS # BLD AUTO: 3.2 K/UL
NEUTROPHILS NFR BLD: 52.1 %
NRBC BLD-RTO: 0 /100 WBC
PLATELET # BLD AUTO: 313 K/UL
PMV BLD AUTO: 11.8 FL
POTASSIUM SERPL-SCNC: 4.9 MMOL/L
PROT SERPL-MCNC: 7.6 G/DL
RBC # BLD AUTO: 4.29 M/UL
SATURATED IRON: 12 %
SODIUM SERPL-SCNC: 137 MMOL/L
TOTAL IRON BINDING CAPACITY: 466 UG/DL
TRANSFERRIN SERPL-MCNC: 315 MG/DL
WBC # BLD AUTO: 6.17 K/UL

## 2018-08-20 PROCEDURE — 85025 COMPLETE CBC W/AUTO DIFF WBC: CPT

## 2018-08-20 PROCEDURE — 83036 HEMOGLOBIN GLYCOSYLATED A1C: CPT

## 2018-08-20 PROCEDURE — 82728 ASSAY OF FERRITIN: CPT

## 2018-08-20 PROCEDURE — 83540 ASSAY OF IRON: CPT

## 2018-08-20 PROCEDURE — 82306 VITAMIN D 25 HYDROXY: CPT

## 2018-08-20 PROCEDURE — 80053 COMPREHEN METABOLIC PANEL: CPT

## 2018-08-20 PROCEDURE — 36415 COLL VENOUS BLD VENIPUNCTURE: CPT | Mod: PO

## 2018-08-20 RX ORDER — FLUOXETINE HYDROCHLORIDE 20 MG/1
20 CAPSULE ORAL DAILY
Qty: 30 CAPSULE | Refills: 6 | Status: SHIPPED | OUTPATIENT
Start: 2018-08-20 | End: 2019-01-28

## 2018-08-20 NOTE — TELEPHONE ENCOUNTER
Pt came in today to get lab, she stopped and talked to me she has been feeling down and depressed . Her Dad is giving up she thinks . She began to cry I calmed her down . She is requesting a call to talk to her . She states she need some medication to help with the anxiety and depression

## 2018-08-20 NOTE — TELEPHONE ENCOUNTER
Please add for 2:30 today 8/21/18 thanks.  Fluoxetine sent - she is not suicidal ear still hurts.  Please cancel 8/27 for her and hold it thanks.

## 2018-08-21 ENCOUNTER — OFFICE VISIT (OUTPATIENT)
Dept: INTERNAL MEDICINE | Facility: CLINIC | Age: 53
End: 2018-08-21
Payer: MEDICARE

## 2018-08-21 VITALS
BODY MASS INDEX: 48.82 KG/M2 | HEART RATE: 97 BPM | OXYGEN SATURATION: 96 % | DIASTOLIC BLOOD PRESSURE: 78 MMHG | SYSTOLIC BLOOD PRESSURE: 120 MMHG | HEIGHT: 65 IN | WEIGHT: 293 LBS

## 2018-08-21 DIAGNOSIS — H61.20 IMPACTED CERUMEN, UNSPECIFIED LATERALITY: Primary | ICD-10-CM

## 2018-08-21 DIAGNOSIS — F33.9 RECURRENT MAJOR DEPRESSIVE DISORDER, REMISSION STATUS UNSPECIFIED: ICD-10-CM

## 2018-08-21 PROCEDURE — 99213 OFFICE O/P EST LOW 20 MIN: CPT | Mod: S$PBB,,, | Performed by: INTERNAL MEDICINE

## 2018-08-21 PROCEDURE — 99215 OFFICE O/P EST HI 40 MIN: CPT | Mod: PBBFAC,PO | Performed by: INTERNAL MEDICINE

## 2018-08-21 PROCEDURE — 99999 PR PBB SHADOW E&M-EST. PATIENT-LVL V: CPT | Mod: PBBFAC,,, | Performed by: INTERNAL MEDICINE

## 2018-08-21 RX ORDER — NEOMYCIN SULFATE, POLYMYXIN B SULFATE AND HYDROCORTISONE 10; 3.5; 1 MG/ML; MG/ML; [USP'U]/ML
3 SUSPENSION/ DROPS AURICULAR (OTIC) 3 TIMES DAILY
Qty: 10 ML | Refills: 0 | Status: SHIPPED | OUTPATIENT
Start: 2018-08-21 | End: 2018-11-26 | Stop reason: ALTCHOICE

## 2018-08-27 NOTE — PROGRESS NOTES
Subjective:       Patient ID: Alivia Thomas is a 52 y.o. female.    Chief Complaint: Follow-up    HPIPt is feeling depressed as her father who is 96 seems like he may not want to keep living telling her he is tired.  This makes her sad.  Also she is overwhelmed with his care.  Trying to get a break from her siblings. Still reports some R ear discomfort.  Discussed recent lab showing elevated Hgba1c - has not been taking care of herself as well with father being sick.  Review of Systems   Respiratory: Negative for shortness of breath (PND or orthopnea).    Cardiovascular: Negative for chest pain (arm pain or jaw pain).   Gastrointestinal: Negative for abdominal pain, diarrhea, nausea and vomiting.   Genitourinary: Negative for dysuria.   Neurological: Negative for seizures, syncope and headaches.       Objective:      Physical Exam   Constitutional: She is oriented to person, place, and time. She appears well-developed and well-nourished. No distress.   HENT:   Head: Normocephalic.   Mouth/Throat: Oropharynx is clear and moist.   R ear difficult to see TM - cerumen in canal.   Eyes: EOM are normal. Pupils are equal, round, and reactive to light.   Neck: Neck supple. No JVD present. No thyromegaly present.   Cardiovascular: Normal rate, regular rhythm, normal heart sounds and intact distal pulses. Exam reveals no gallop and no friction rub.   No murmur heard.  Pulmonary/Chest: Effort normal and breath sounds normal. She has no wheezes. She has no rales.   Abdominal: Soft. Bowel sounds are normal. She exhibits no distension and no mass. There is no tenderness. There is no rebound and no guarding.   Musculoskeletal: She exhibits no edema.   Lymphadenopathy:     She has no cervical adenopathy.   Neurological: She is alert and oriented to person, place, and time. She has normal reflexes.   Skin: Skin is warm and dry.   Psychiatric: She has a normal mood and affect. Her behavior is normal. Judgment and thought content  normal.       Assessment:       1. Impacted cerumen, unspecified laterality    2. Recurrent major depressive disorder, remission status unspecified    3. Uncontrolled type 2 diabetes mellitus without complication, with long-term current use of insulin        Plan:   Impacted cerumen, unspecified laterality  -     Ambulatory consult to ENT    Recurrent major depressive disorder, remission status unspecified  -     Ambulatory consult to Psychiatry    Uncontrolled type 2 diabetes mellitus without complication, with long-term current use of insulin  -     insulin detemir U-100 (LEVEMIR FLEXPEN) 100 unit/mL (3 mL) SubQ InPn pen; Inject 30 Units into the skin every evening. Inject 40 units every evening  Dispense: 1 Box; Refill: 6    Other orders  -    Trt for otitis externa, ask ENT to clean cerumen and re- eval - may be getting water behind cerumen causing infection that is recurrent neomycin-polymyxin-hydrocortisone (CORTISPORIN) 3.5-10,000-1 mg/mL-unit/mL-% otic suspension; Place 3 drops into the right ear 3 (three) times daily.  Dispense: 10 mL; Refill: 0    Depression  Restart fluoxetine, not suicidal or homicidal

## 2018-08-29 ENCOUNTER — OFFICE VISIT (OUTPATIENT)
Dept: PAIN MEDICINE | Facility: CLINIC | Age: 53
End: 2018-08-29
Payer: MEDICARE

## 2018-08-29 VITALS
BODY MASS INDEX: 48.82 KG/M2 | HEIGHT: 65 IN | SYSTOLIC BLOOD PRESSURE: 121 MMHG | WEIGHT: 293 LBS | HEART RATE: 97 BPM | DIASTOLIC BLOOD PRESSURE: 81 MMHG | TEMPERATURE: 98 F | RESPIRATION RATE: 18 BRPM

## 2018-08-29 DIAGNOSIS — M51.36 DDD (DEGENERATIVE DISC DISEASE), LUMBAR: ICD-10-CM

## 2018-08-29 DIAGNOSIS — Z96.651 STATUS POST TOTAL RIGHT KNEE REPLACEMENT: ICD-10-CM

## 2018-08-29 DIAGNOSIS — M25.561 BILATERAL CHRONIC KNEE PAIN: ICD-10-CM

## 2018-08-29 DIAGNOSIS — M47.816 SPONDYLOSIS OF LUMBAR REGION WITHOUT MYELOPATHY OR RADICULOPATHY: ICD-10-CM

## 2018-08-29 DIAGNOSIS — M47.816 FACET ARTHRITIS OF LUMBAR REGION: ICD-10-CM

## 2018-08-29 DIAGNOSIS — M25.562 BILATERAL CHRONIC KNEE PAIN: ICD-10-CM

## 2018-08-29 DIAGNOSIS — G89.4 CHRONIC PAIN DISORDER: ICD-10-CM

## 2018-08-29 DIAGNOSIS — Z79.891 ENCOUNTER FOR LONG-TERM OPIATE ANALGESIC USE: ICD-10-CM

## 2018-08-29 DIAGNOSIS — G89.29 BILATERAL CHRONIC KNEE PAIN: ICD-10-CM

## 2018-08-29 DIAGNOSIS — M17.0 PRIMARY OSTEOARTHRITIS OF BOTH KNEES: ICD-10-CM

## 2018-08-29 PROCEDURE — 99999 PR PBB SHADOW E&M-EST. PATIENT-LVL III: CPT | Mod: PBBFAC,,, | Performed by: NURSE PRACTITIONER

## 2018-08-29 PROCEDURE — 99213 OFFICE O/P EST LOW 20 MIN: CPT | Mod: PBBFAC | Performed by: NURSE PRACTITIONER

## 2018-08-29 PROCEDURE — 99214 OFFICE O/P EST MOD 30 MIN: CPT | Mod: S$PBB,,, | Performed by: NURSE PRACTITIONER

## 2018-08-29 RX ORDER — OXYCODONE AND ACETAMINOPHEN 10; 325 MG/1; MG/1
1 TABLET ORAL EVERY 6 HOURS PRN
Qty: 120 TABLET | Refills: 0 | Status: SHIPPED | OUTPATIENT
Start: 2018-08-29 | End: 2018-09-28 | Stop reason: SDUPTHER

## 2018-08-29 NOTE — PROGRESS NOTES
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 maintained without evidence of acute fracture or dislocation.  Marrow signal is unremarkable.    Spinal canal contents are unremarkable.  No abnormal masses or collections.    Individual levels as detailed below:    C2-3: No significant spinal canal stenosis or neuroforaminal narrowing.    C3-4: Facet arthropathy.  No significant spinal canal stenosis or neuroforaminal narrowing.    C4-5: Facet arthropathy.  Small broad-based disc osteophyte complex.  Mild right neuroforaminal narrowing.  Mild spinal canal stenosis.    C5-6: Facet arthropathy.  Uncovertebral joint spurring.  Broad-based posterior disc osteophyte complex.  Mild right neuroforaminal narrowing.  Mild spinal canal stenosis.    C6-7: Facet arthropathy.  No significant spinal canal stenosis or neuroforaminal narrowing.    C7-T1: No significant spinal canal stenosis or neuroforaminal narrowing.    Paraspinal muscles are unremarkable.  Soft tissues are intact.   Impression       Mild multilevel cervical spondylosis with mild spinal canal stenosis and mild right neuroforaminal narrowing at C4-5 and C5-6.       Lab Results   Component Value Date    HGBA1C 10.7 (H) 08/20/2018     Lab Results   Component Value Date    CHOL 152 05/17/2018    CHOL 191 05/09/2017    CHOL 200 (H) 12/15/2016     Lab Results   Component Value  Date    HDL 50 05/17/2018    HDL 48 05/09/2017    HDL 40 12/15/2016     Lab Results   Component Value Date    LDLCALC 91.0 05/17/2018    LDLCALC 129.0 05/09/2017    LDLCALC 141.0 12/15/2016     Lab Results   Component Value Date    TRIG 55 05/17/2018    TRIG 70 05/09/2017    TRIG 95 12/15/2016     Lab Results   Component Value Date    CHOLHDL 32.9 05/17/2018    CHOLHDL 25.1 05/09/2017    CHOLHDL 20.0 12/15/2016         Past Medical History:   Diagnosis Date    Asthma     Diabetes mellitus type II     DJD (degenerative joint disease) of knee 6/19/2014    Heartburn     Hyperlipidemia     Morbid obesity     Sleep apnea      Past Surgical History:   Procedure Laterality Date    CARPAL TUNNEL RELEASE      CARPAL TUNNEL RELEASE  1980s    left    CARPAL TUNNEL RELEASE  2012    right    JOINT REPLACEMENT Bilateral     with 2 revisions on rt    KNEE SURGERY  3/2010    orthroscope    KNEE SURGERY  6-19-14    left TKR     Family History   Problem Relation Age of Onset    Diabetes Mother     Hypertension Mother     Cataracts Mother     Diabetes Father     Cataracts Father     Coronary artery disease Brother     Amblyopia Neg Hx     Blindness Neg Hx     Cancer Neg Hx     Glaucoma Neg Hx     Macular degeneration Neg Hx     Retinal detachment Neg Hx     Strabismus Neg Hx     Stroke Neg Hx     Thyroid disease Neg Hx      Social History     Socioeconomic History    Marital status: Single     Spouse name: Not on file    Number of children: Not on file    Years of education: Not on file    Highest education level: Not on file   Social Needs    Financial resource strain: Not on file    Food insecurity - worry: Not on file    Food insecurity - inability: Not on file    Transportation needs - medical: Not on file    Transportation needs - non-medical: Not on file   Occupational History    Not on file   Tobacco Use    Smoking status: Never Smoker    Smokeless tobacco: Never Used   Substance and  Sexual Activity    Alcohol use: Yes     Comment: occasionally     Drug use: No    Sexual activity: Yes     Partners: Female     Birth control/protection: None   Other Topics Concern    Not on file   Social History Narrative    Disabled. The patient is the youngest of 6 siblings. Single. Lives with single-sex partner.                  Review of patient's allergies indicates:  No Known Allergies    Medication List with Changes/Refills   Current Medications    ASPIRIN (ECOTRIN) 81 MG EC TABLET    Take 1 tablet by mouth every morning. prevents heart attacks and strokes    ATORVASTATIN (LIPITOR) 20 MG TABLET    Take 1 tablet (20 mg total) by mouth once daily.    BLOOD SUGAR DIAGNOSTIC STRP    Freestyle light strips and lancets    BLOOD SUGAR DIAGNOSTIC STRP    Check glucose four times daily Strips and lancets covered by insurance E11.9 - One touch    BLOOD-GLUCOSE METER (FREESTYLE SYSTEM KIT) KIT    Use as instructed    BLOOD-GLUCOSE METER KIT    Check glucose four times daily E11.9 meter covered by insurance One Touch    CELECOXIB (CELEBREX) 200 MG CAPSULE    TAKE 1 CAPSULE BY MOUTH DAILY    DICLOFENAC SODIUM (VOLTAREN) 1 % GEL    Apply 2 g topically once daily.    DIETHYLPROPION 75 MG TBSR    Take 75 mg by mouth once daily.    ERGOCALCIFEROL (ERGOCALCIFEROL) 50,000 UNIT CAP    One weekly for 8 weeks then 2,000 IU daily OTC    FLUOXETINE (PROZAC) 20 MG CAPSULE    Take 1 capsule (20 mg total) by mouth once daily.    FLUTICASONE (FLONASE) 50 MCG/ACTUATION NASAL SPRAY    1 spray by Each Nare route 2 (two) times daily as needed for Rhinitis.    INSULIN DETEMIR U-100 (LEVEMIR FLEXPEN) 100 UNIT/ML (3 ML) SUBQ INPN PEN    Inject 30 Units into the skin every evening. Inject 40 units every evening    INSULIN LISPRO (HUMALOG KWIKPEN) 100 UNIT/ML INPN PEN    11 Units before meals plus sliding scale    LISINOPRIL (PRINIVIL,ZESTRIL) 2.5 MG TABLET    One daily for proteinuria.    METFORMIN (GLUCOPHAGE-XR) 500 MG 24 HR TABLET     "Take 2 tablets (1,000 mg total) by mouth 2 (two) times daily.    NEOMYCIN-POLYMYXIN-HYDROCORTISONE (CORTISPORIN) 3.5-10,000-1 MG/ML-UNIT/ML-% OTIC SUSPENSION    Place 3 drops into the right ear 3 (three) times daily.    OMEPRAZOLE (PRILOSEC) 20 MG CAPSULE    Take 1 capsule (20 mg total) by mouth every morning.    OXYCODONE-ACETAMINOPHEN (PERCOCET)  MG PER TABLET    Take 1 tablet by mouth every 6 (six) hours as needed for Pain.    PEN NEEDLE, DIABETIC 33 GAUGE X 5/32" NDLE    1 application by Misc.(Non-Drug; Combo Route) route 4 (four) times daily with meals and nightly.    VENTOLIN HFA 90 MCG/ACTUATION INHALER    USE 2 PUFFS EVERY 4 TO 6 HOURS AS NEEDED FOR SHORTNESS OF BREATH OR WHEEZE    VITAMIN D 185 MG TAB    Take 5,000 mg by mouth once daily.         REVIEW OF SYSTEMS:    GENERAL:  Patient is attempting to lose weight.  RESPIRATORY:  Negative for cough, wheezing or shortness of breath, patient denies any recent URI.  CARDIOVASCULAR:  Negative for chest pain, leg swelling or palpitations.  GI:  Negative for abdominal discomfort, blood in stools or black stools or change in bowel habits, occasional constipation.  MUSCULOSKELETAL:  See HPI.  SKIN:  Negative for lesions, rash, and itching.  PSYCH:  No mood disorder or recent psychosocial stressors.  Patient's sleep is disturbed secondary to pain (patient reports also that she has insomnia).  HEMATOLOGY/LYMPHOLOGY:  Negative for prolonged bleeding, bruising easily or swollen nodes.  81 mg aspirin  ENDO: Patient has a history of diabetes  NEURO:   No history of headaches, syncope, paralysis, seizures or tremors.  All other reviewed and negative other than HPI.    OBJECTIVE:    /81   Pulse 97   Temp 98.2 °F (36.8 °C) (Oral)   Resp 18   Ht 5' 5" (1.651 m)   Wt (!) 164 kg (361 lb 8.9 oz)   BMI 60.17 kg/m²     PHYSICAL EXAMINATION:    GENERAL: Well appearing, in no acute distress, alert and oriented x3.   PSYCH:  Mood and affect appropriate.  SKIN: Skin " color, texture, turgor normal, no rashes or lesions.  HEAD/FACE:  Normocephalic, atraumatic.   CV: RRR with palpation of the radial artery.  PULM: No evidence of respiratory difficulty, symmetric chest rise.  NECK: There is pain with palpation over cervical paraspinal and trapezius muscles bilaterally.  There is limited ROM on extension and lateral rotation.  Positive facet loading bilaterally.  Spurling is negative bilaterally.  BACK: Negative SLR bilaterally. There is pain to palpation over the lumbar facet joints on the left.  Limited ROM with pain on flexion and extension.  Positive facet loading on the left.  EXTREMITIES: Pain with palpation to bilateral SI joints.  JENNA is negative bilaterally. Well-healed midline scars to bilateral knees.  Painful extension and flexion of bilateral knees. Medial and lateral joint line tenderness to bilateral knees.  MUSCULOSKELETAL: Bilateral upper and lower extremity strength is normal and symmetric.  No atrophy or tone abnormalities are noted.  NEURO: Bilateral lower extremity coordination and muscle stretch reflexes are physiologic and symmetric.  Plantar response are downgoing. No clonus.  No loss of sensation is noted.  GAIT: Antalgic.      Assessment:       Encounter Diagnoses   Name Primary?    Spondylosis of lumbar region without myelopathy or radiculopathy     Facet arthritis of lumbar region     DDD (degenerative disc disease), lumbar     Primary osteoarthritis of both knees     Bilateral chronic knee pain     Status post total right knee replacement     Chronic pain disorder     Encounter for long-term opiate analgesic use          Plan:       - Previous imaging was reviewed and discussed with the patient today.     - Continue oxycodone-acetaminophen 10/325 one tablet every 6 hours PRN pain, #120, 0 refills.     - The patient is here today for a refill of current pain medications and they believe these provide effective pain control and improvements in  quality of life.  The patient notes no serious side effects, and feels the benefits outweigh the risks.  The patient was reminded of the pain contract that they signed previously as well as the risks and benefits of the medication including possible death.  The updated [place] [employer] prescription monitoring program was reviewed, and the patient has been found to be compliant with current treatment plan.     - UDS from 8/1/2018 reviewed and consistent.  No UDS today.    - Continue topical pain cream PRN up to 5x/day.    - RTC in 1 month or sooner if needed.         The above plan and management options were discussed at length with patient. Patient is in agreement with the above and verbalized understanding.     Virginia Sanchez, EMILY  08/29/2018

## 2018-09-28 ENCOUNTER — OFFICE VISIT (OUTPATIENT)
Dept: PAIN MEDICINE | Facility: CLINIC | Age: 53
End: 2018-09-28
Payer: MEDICARE

## 2018-09-28 VITALS
BODY MASS INDEX: 48.82 KG/M2 | HEART RATE: 81 BPM | SYSTOLIC BLOOD PRESSURE: 131 MMHG | HEIGHT: 65 IN | DIASTOLIC BLOOD PRESSURE: 82 MMHG | WEIGHT: 293 LBS | TEMPERATURE: 99 F

## 2018-09-28 DIAGNOSIS — M25.561 BILATERAL CHRONIC KNEE PAIN: ICD-10-CM

## 2018-09-28 DIAGNOSIS — M47.816 SPONDYLOSIS OF LUMBAR REGION WITHOUT MYELOPATHY OR RADICULOPATHY: ICD-10-CM

## 2018-09-28 DIAGNOSIS — M47.816 FACET ARTHRITIS OF LUMBAR REGION: ICD-10-CM

## 2018-09-28 DIAGNOSIS — M25.562 BILATERAL CHRONIC KNEE PAIN: ICD-10-CM

## 2018-09-28 DIAGNOSIS — Z96.651 STATUS POST TOTAL RIGHT KNEE REPLACEMENT: ICD-10-CM

## 2018-09-28 DIAGNOSIS — M51.36 DDD (DEGENERATIVE DISC DISEASE), LUMBAR: ICD-10-CM

## 2018-09-28 DIAGNOSIS — G89.29 BILATERAL CHRONIC KNEE PAIN: ICD-10-CM

## 2018-09-28 DIAGNOSIS — G89.4 CHRONIC PAIN DISORDER: ICD-10-CM

## 2018-09-28 DIAGNOSIS — Z79.891 ENCOUNTER FOR LONG-TERM OPIATE ANALGESIC USE: ICD-10-CM

## 2018-09-28 DIAGNOSIS — M17.0 PRIMARY OSTEOARTHRITIS OF BOTH KNEES: ICD-10-CM

## 2018-09-28 DIAGNOSIS — M79.10 MYALGIA: Primary | ICD-10-CM

## 2018-09-28 PROCEDURE — 20553 NJX 1/MLT TRIGGER POINTS 3/>: CPT | Mod: S$PBB,,, | Performed by: NURSE PRACTITIONER

## 2018-09-28 PROCEDURE — 99213 OFFICE O/P EST LOW 20 MIN: CPT | Mod: PBBFAC | Performed by: NURSE PRACTITIONER

## 2018-09-28 PROCEDURE — 99214 OFFICE O/P EST MOD 30 MIN: CPT | Mod: 25,S$PBB,, | Performed by: NURSE PRACTITIONER

## 2018-09-28 PROCEDURE — 20553 NJX 1/MLT TRIGGER POINTS 3/>: CPT | Mod: PBBFAC | Performed by: NURSE PRACTITIONER

## 2018-09-28 PROCEDURE — 99999 PR PBB SHADOW E&M-EST. PATIENT-LVL III: CPT | Mod: PBBFAC,,, | Performed by: NURSE PRACTITIONER

## 2018-09-28 RX ORDER — METHYLPREDNISOLONE ACETATE 40 MG/ML
40 INJECTION, SUSPENSION INTRA-ARTICULAR; INTRALESIONAL; INTRAMUSCULAR; SOFT TISSUE ONCE
Status: COMPLETED | OUTPATIENT
Start: 2018-09-28 | End: 2018-09-28

## 2018-09-28 RX ORDER — BUPIVACAINE HYDROCHLORIDE 2.5 MG/ML
9 INJECTION, SOLUTION EPIDURAL; INFILTRATION; INTRACAUDAL ONCE
Status: COMPLETED | OUTPATIENT
Start: 2018-09-28 | End: 2018-09-28

## 2018-09-28 RX ORDER — OXYCODONE AND ACETAMINOPHEN 10; 325 MG/1; MG/1
1 TABLET ORAL EVERY 8 HOURS PRN
Qty: 90 TABLET | Refills: 0 | Status: SHIPPED | OUTPATIENT
Start: 2018-09-28 | End: 2018-10-23 | Stop reason: SDUPTHER

## 2018-09-28 RX ADMIN — BUPIVACAINE HYDROCHLORIDE 22.5 MG: 2.5 INJECTION, SOLUTION EPIDURAL; INFILTRATION; INTRACAUDAL; PERINEURAL at 09:09

## 2018-09-28 RX ADMIN — METHYLPREDNISOLONE ACETATE 40 MG: 40 INJECTION, SUSPENSION INTRA-ARTICULAR; INTRALESIONAL; INTRAMUSCULAR; SOFT TISSUE at 09:09

## 2018-09-28 NOTE — PROGRESS NOTES
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 lesion, or chondrocalcinosis.    Cervical MRI 4/24/2018:    Narrative     EXAMINATION:  MRI CERVICAL SPINE WITHOUT CONTRAST    CLINICAL HISTORY:  Neck pain, first study;.  Cervicalgia.    TECHNIQUE:  Multiplanar, multisequence MR images of the cervical spine were acquired without the administration of contrast.    COMPARISON:  None.    FINDINGS:  There is reversal of the normal cervical lordosis which could be related to patient positioning versus muscle spasm.    Cervical vertebral body heights are well maintained without evidence of acute fracture or dislocation.  Marrow signal is unremarkable.    Spinal canal contents are unremarkable.  No abnormal masses or collections.    Individual levels as detailed below:    C2-3: No significant spinal canal stenosis or neuroforaminal narrowing.    C3-4: Facet arthropathy.  No significant spinal canal stenosis or neuroforaminal narrowing.    C4-5: Facet arthropathy.  Small broad-based disc osteophyte complex.  Mild right neuroforaminal narrowing.  Mild spinal canal stenosis.    C5-6: Facet arthropathy.  Uncovertebral joint spurring.  Broad-based posterior disc osteophyte complex.  Mild right neuroforaminal narrowing.  Mild spinal canal stenosis.    C6-7: Facet arthropathy.  No significant spinal canal stenosis or neuroforaminal  narrowing.    C7-T1: No significant spinal canal stenosis or neuroforaminal narrowing.    Paraspinal muscles are unremarkable.  Soft tissues are intact.   Impression       Mild multilevel cervical spondylosis with mild spinal canal stenosis and mild right neuroforaminal narrowing at C4-5 and C5-6.       Lab Results   Component Value Date    HGBA1C 10.7 (H) 08/20/2018     Lab Results   Component Value Date    CHOL 152 05/17/2018    CHOL 191 05/09/2017    CHOL 200 (H) 12/15/2016     Lab Results   Component Value Date    HDL 50 05/17/2018    HDL 48 05/09/2017    HDL 40 12/15/2016     Lab Results   Component Value Date    LDLCALC 91.0 05/17/2018    LDLCALC 129.0 05/09/2017    LDLCALC 141.0 12/15/2016     Lab Results   Component Value Date    TRIG 55 05/17/2018    TRIG 70 05/09/2017    TRIG 95 12/15/2016     Lab Results   Component Value Date    CHOLHDL 32.9 05/17/2018    CHOLHDL 25.1 05/09/2017    CHOLHDL 20.0 12/15/2016         Past Medical History:   Diagnosis Date    Asthma     Diabetes mellitus type II     DJD (degenerative joint disease) of knee 6/19/2014    Heartburn     Hyperlipidemia     Morbid obesity     Sleep apnea      Past Surgical History:   Procedure Laterality Date    BLOCK, NERVE, INTERCOSTAL, SINGLE Left 4/2/2014    Performed by [facility] Wagner [facility] at [facility] MGT    BLOCK-NERVE-MEDIAL BRANCH-LUMBAR Bilateral 3/1/2016    Performed by [facility]nia [facility] at [facility] MGT    BLOCK-NERVE-MEDIAL BRANCH-LUMBAR Bilateral 2/16/2016    [facility] by [facility] at [facility] MGT    CARPAL TUNNEL RELEASE      CARPAL TUNNEL RELEASE  1980s    left    CARPAL TUNNEL RELEASE  2012    right    ESOPHAGOGASTRODUODENOSCOPY (EGD) N/A 12/19/2016    Performed by [facility]oud [facility] at [facility] ENDO (2ND FLR)    JOINT REPLACEMENT Bilateral     with 2 revisions on rt    KNEE SURGERY  3/2010    orthroscope    KNEE SURGERY  6-19-14    left TKR    RADIOFREQUENCY ABLATION OF LUMBAR MEDIAL BRANCH NERVE  AT SINGLE LEVEL Left 6/26/2018    Procedure: RADIOFREQUENCY ABLATION, NERVE, MEDIAL BRANCH, LUMBAR, 1 LEVEL;  Surgeon: Lina Wagner MD;  Location: [facility];  Service: Pain Management;  Laterality: Left;  Left Cooled RFA @ L2,3,4,5  [id number]  with Sedation    1 of 2    RADIOFREQUENCY ABLATION OF LUMBAR MEDIAL BRANCH NERVE AT SINGLE LEVEL Right 7/10/2018    Procedure: RADIOFREQUENCY ABLATION, NERVE, MEDIAL BRANCH, LUMBAR, 1 LEVEL;  Surgeon: Lina Wagner MD;  Location: [facility];  Service: Pain Management;  Laterality: Right;  RIght Cooled RFA @ L2,3,4,5  [id number] with Sedation    2 of 2    RADIOFREQUENCY ABLATION, NERVE, MEDIAL BRANCH, LUMBAR, 1 LEVEL Right 7/10/2018    Performed by [facility] at [facility]    RADIOFREQUENCY ABLATION, NERVE, MEDIAL BRANCH, LUMBAR, 1 LEVEL Left 6/26/2018    Performed by [facility] at [facility]    RADIOFREQUENCY THERMOCOAGULATION (RFTC)-NERVE-MEDIAN BRANCH-LUMBAR Right 1/9/2018    Performed by [facility] at [facility]    RADIOFREQUENCY THERMOCOAGULATION (RFTC)-NERVE-MEDIAN BRANCH-LUMBAR Left 12/26/2017    Performed by [facility] at [facility]    RADIOFREQUENCY THERMOCOAGULATION (RFTC)-NERVE-MEDIAN BRANCH-LUMBAR Right 10/19/2016    Performed by [facility] at [facility]    RADIOFREQUENCY THERMOCOAGULATION (RFTC)-NERVE-MEDIAN BRANCH-LUMBAR Left 10/5/2016    Performed by [facility] at [facility]    RADIOFREQUENCY THERMOCOAGULATION (RFTC)-NERVE-MEDIAN BRANCH-LUMBAR Right 4/20/2016    Performed by [facility] at [facility]    RADIOFREQUENCY THERMOCOAGULATION (RFTC)-NERVE-MEDIAN BRANCH-LUMBAR Left 4/6/2016    Performed by [facility] at [facility]    RADIOFREQUENCY THERMOCOAGULATION (RFTC)-NERVE-MEDIAN BRANCH-LUMBAR/COOLED Right 5/9/2017    Performed by Lina Wagner MD at [facility] MGT    RADIOFREQUENCY THERMOCOAGULATION (RFTC)-NERVE-MEDIAN  BRANCH-LUMBAR/COOLED Left 4/26/2017    Performed by [facility] at [facility] PAIN MGT    RELEASE, TRIGGER FINGER Right 2/4/2013    [facility] 1ST FLR    REPLACEMENT-KNEE-TOTAL Left 6/19/2014    [facility] Theodore Swan [facility] at [facility] OR 2ND FLR    Revision Carpal Tunnel-Right Right 12/14/2015    Per[facility] at [facility] OR    REVISION-ARTHROPLASTY-KNEE-TOTAL Right 11/4/2014    [facility] at [facility] 2ND FLR     Family History   Problem Relation Age of Onset    Diabetes Mother     Hypertension Mother     Cataracts Mother     Diabetes Father     Cataracts Father     Coronary artery disease Brother     Amblyopia Neg Hx     Blindness Neg Hx     Cancer Neg Hx     Glaucoma Neg Hx     Macular degeneration Neg Hx     Retinal detachment Neg Hx     Strabismus Neg Hx     Stroke Neg Hx     Thyroid disease Neg Hx      Social History     Socioeconomic History    Marital status: Single     Spouse name: Not on file    Number of children: Not on file    Years of education: Not on file    Highest education level: Not on file   Social Needs    Financial resource strain: Not on file    Food insecurity - worry: Not on file    Food insecurity - inability: Not on file    Transportation needs - medical: Not on file    Transportation needs - non-medical: Not on file   Occupational History    Not on file   Tobacco Use    Smoking status: Never Smoker    Smokeless tobacco: Never Used   Substance and Sexual Activity    Alcohol use: Yes     Comment: occasionally     Drug use: No    Sexual activity: Yes     Partners: Female     Birth control/protection: None   Other Topics Concern    Not on file   Social History Narrative    Disabled. The patient is the youngest of 6 siblings. Single. Lives with single-sex partner.                  Review of patient's allergies indicates:  No Known Allergies       Medication List            Accurate as of 9/28/18  8:23 AM. If you have any questions, ask your nurse or doctor.               CONTINUE taking these medications    aspirin 81 MG EC tablet  Commonly known as:  ECOTRIN     atorvastatin 20 MG tablet  Commonly known as:  LIPITOR  Take 1 tablet (20 mg total) by mouth once daily.     * blood sugar diagnostic Strp  Freestyle light strips and lancets     * blood sugar diagnostic Strp  Check glucose four times daily Strips and lancets covered by insurance E11.9 - One touch     * blood-glucose meter kit  Commonly known as:  FREESTYLE SYSTEM KIT  Use as instructed     * blood-glucose meter kit  Check glucose four times daily E11.9 meter covered by insurance One Touch     celecoxib 200 MG capsule  Commonly known as:  CeleBREX  TAKE 1 CAPSULE BY MOUTH DAILY     diclofenac sodium 1 % Gel  Commonly known as:  VOLTAREN  Apply 2 g topically once daily.     diethylpropion 75 mg Tbsr  Take 75 mg by mouth once daily.     ergocalciferol 50,000 unit Cap  Commonly known as:  ERGOCALCIFEROL  One weekly for 8 weeks then 2,000 IU daily OTC     FLUoxetine 20 MG capsule  Commonly known as:  PROZAC  Take 1 capsule (20 mg total) by mouth once daily.     fluticasone 50 mcg/actuation nasal spray  Commonly known as:  FLONASE  1 spray by Each Nare route 2 (two) times daily as needed for Rhinitis.     insulin detemir U-100 100 unit/mL (3 mL) Inpn pen  Commonly known as:  LEVEMIR FLEXPEN  Inject 30 Units into the skin every evening. Inject 40 units every evening     insulin lispro 100 unit/mL Inpn pen  Commonly known as:  HUMALOG KWIKPEN  11 Units before meals plus sliding scale     lisinopril 2.5 MG tablet  Commonly known as:  PRINIVIL,ZESTRIL  One daily for proteinuria.     metFORMIN 500 MG 24 hr tablet  Commonly known as:  GLUCOPHAGE-XR  Take 2 tablets (1,000 mg total) by mouth 2 (two) times daily.     neomycin-polymyxin-hydrocortisone 3.5-10,000-1 mg/mL-unit/mL-% otic suspension  Commonly known as:  CORTISPORIN  Place 3  "drops into the right ear 3 (three) times daily.     omeprazole 20 MG capsule  Commonly known as:  PRILOSEC  Take 1 capsule (20 mg total) by mouth every morning.     oxyCODONE-acetaminophen  mg per tablet  Commonly known as:  PERCOCET  Take 1 tablet by mouth every 6 (six) hours as needed for Pain.     pen needle, diabetic 33 gauge x 5/32" Ndle  1 application by Misc.(Non-Drug; Combo Route) route 4 (four) times daily with meals and nightly.     VENTOLIN HFA 90 mcg/actuation inhaler  Generic drug:  albuterol  USE 2 PUFFS EVERY 4 TO 6 HOURS AS NEEDED FOR SHORTNESS OF BREATH OR WHEEZE     vitamin D 1000 units Tab  Commonly known as:  VITAMIN D3         * This list has 4 medication(s) that are the same as other medications prescribed for you. Read the directions carefully, and ask your doctor or other care provider to review them with you.                  REVIEW OF SYSTEMS:    GENERAL:  Patient is attempting to lose weight.  RESPIRATORY:  Negative for cough, wheezing or shortness of breath, patient denies any recent URI.  CARDIOVASCULAR:  Negative for chest pain, leg swelling or palpitations.  GI:  Negative for abdominal discomfort, blood in stools or black stools or change in bowel habits, occasional constipation.  MUSCULOSKELETAL:  See HPI.  SKIN:  Negative for lesions, rash, and itching.  PSYCH:  No mood disorder or recent psychosocial stressors.  Patient's sleep is disturbed secondary to pain (patient reports also that she has insomnia).  HEMATOLOGY/LYMPHOLOGY:  Negative for prolonged bleeding, bruising easily or swollen nodes.  81 mg aspirin  ENDO: Patient has a history of diabetes  NEURO:   No history of headaches, syncope, paralysis, seizures or tremors.  All other reviewed and negative other than HPI.    OBJECTIVE:    /82   Pulse 81   Temp 98.7 °F (37.1 °C)   Ht 5' 5" (1.651 m)   Wt (!) 168.2 kg (370 lb 13 oz)   BMI 61.71 kg/m²     PHYSICAL EXAMINATION:    GENERAL: Well appearing, in no acute " distress, alert and oriented x3.   PSYCH:  Mood and affect appropriate.  SKIN: Skin color, texture, turgor normal, no rashes or lesions.  HEAD/FACE:  Normocephalic, atraumatic.   CV: RRR with palpation of the radial artery.  PULM: No evidence of respiratory difficulty, symmetric chest rise.  NECK: There is pain with palpation over cervical paraspinal and trapezius muscles bilaterally.  There is limited ROM on extension and lateral rotation.  Positive facet loading bilaterally.  Spurling is negative bilaterally.  BACK: Negative SLR bilaterally. There is pain to palpation over the lumbar facet joints on the left.  Limited ROM with pain on flexion and extension.  Positive facet loading on the left.  EXTREMITIES: Pain with palpation to bilateral SI joints.  JENNA is negative bilaterally. Well-healed midline scars to bilateral knees.  Painful extension and flexion of bilateral knees. Medial and lateral joint line tenderness to bilateral knees.  MUSCULOSKELETAL: Bilateral upper and lower extremity strength is normal and symmetric.  No atrophy or tone abnormalities are noted.  NEURO: Bilateral lower extremity coordination and muscle stretch reflexes are physiologic and symmetric.  Plantar response are downgoing. No clonus.  No loss of sensation is noted.  GAIT: Antalgic.      Assessment:       Encounter Diagnoses   Name Primary?    Spondylosis of lumbar region without myelopathy or radiculopathy     Facet arthritis of lumbar region     DDD (degenerative disc disease), lumbar     Primary osteoarthritis of both knees     Bilateral chronic knee pain     Status post total right knee replacement     Encounter for long-term opiate analgesic use     Myalgia Yes         Plan:       - Previous imaging was reviewed and discussed with the patient today.     - Continue oxycodone-acetaminophen 10/325 which we will decrease to one tablet every 8 hours PRN pain, #90, 0 refills.  We had a long discussion regarding that this is not  intended for long term use.  Ultimately increased activity and weight loss will be best for her.  Her last A1C was very elevated.  She reports a lot of stress which she says leads to unhealthy eating habits.  I discussed restarting PT but she says she did a program last year through her PCP and wants to talk to her about that.      - The patient is here today for a refill of current pain medications and they believe these provide effective pain control and improvements in quality of life.  The patient notes no serious side effects, and feels the benefits outweigh the risks.  The patient was reminded of the pain contract that they signed previously as well as the risks and benefits of the medication including possible death.  The updated [place] Board  Pharmacy prescription monitoring program was reviewed, and the patient has been found to be compliant with current treatment plan.     - Will perform TPIs today for myofascial pain.     - UDS from 8/1/2018 reviewed and consistent.  No UDS today.    - Continue topical pain cream PRN up to 5x/day.    - RTC in 1 month or sooner if needed.         The above plan and management options were discussed at length with patient. Patient is in agreement with the above and verbalized understanding.     Virginia Sanchez, EMILY  09/28/2018       Trigger Point Injection:   The procedure was discussed with the patient including complications of nerve damage,  bleeding, infection, and failure of pain relief.   Trigger points were identified by palpation and marked.  Alcohol prep of sites done. A mixture of 9mL 0.25% bupivacaine +40mg Depo-Medrol was prepared (10 mL total).   A 27-gauge needle was advanced to the point of maximal tenderness, and  1.5 mL  was injected after negative aspiration. All sites done in the same manner. Patient tolerated the procedure well and without complications. Sites injected included: bilateral lumbar paraspinal muscles (6 sites total).

## 2018-09-29 DIAGNOSIS — G89.29 CHRONIC PAIN OF BOTH KNEES: ICD-10-CM

## 2018-09-29 DIAGNOSIS — M25.562 CHRONIC PAIN OF BOTH KNEES: ICD-10-CM

## 2018-09-29 DIAGNOSIS — M25.561 CHRONIC PAIN OF BOTH KNEES: ICD-10-CM

## 2018-09-30 RX ORDER — CELECOXIB 200 MG/1
CAPSULE ORAL
Qty: 30 CAPSULE | Refills: 1 | Status: SHIPPED | OUTPATIENT
Start: 2018-09-30 | End: 2018-12-22 | Stop reason: SDUPTHER

## 2018-10-11 ENCOUNTER — OFFICE VISIT (OUTPATIENT)
Dept: PODIATRY | Facility: CLINIC | Age: 53
End: 2018-10-11
Payer: MEDICARE

## 2018-10-11 VITALS
SYSTOLIC BLOOD PRESSURE: 145 MMHG | DIASTOLIC BLOOD PRESSURE: 81 MMHG | HEIGHT: 65 IN | HEART RATE: 89 BPM | WEIGHT: 293 LBS | BODY MASS INDEX: 48.82 KG/M2

## 2018-10-11 DIAGNOSIS — E11.49 TYPE II DIABETES MELLITUS WITH NEUROLOGICAL MANIFESTATIONS: Primary | ICD-10-CM

## 2018-10-11 DIAGNOSIS — R60.0 BILATERAL LOWER EXTREMITY EDEMA: ICD-10-CM

## 2018-10-11 DIAGNOSIS — B35.1 ONYCHOMYCOSIS DUE TO DERMATOPHYTE: ICD-10-CM

## 2018-10-11 PROCEDURE — 99999 PR PBB SHADOW E&M-EST. PATIENT-LVL III: CPT | Mod: PBBFAC,,, | Performed by: PODIATRIST

## 2018-10-11 PROCEDURE — 99213 OFFICE O/P EST LOW 20 MIN: CPT | Mod: PBBFAC | Performed by: PODIATRIST

## 2018-10-11 PROCEDURE — 11721 DEBRIDE NAIL 6 OR MORE: CPT | Mod: Q9,PBBFAC | Performed by: PODIATRIST

## 2018-10-11 PROCEDURE — 99499 UNLISTED E&M SERVICE: CPT | Mod: S$PBB,,, | Performed by: PODIATRIST

## 2018-10-11 NOTE — PROGRESS NOTES
"HPI:   The patient is a 52 y.o.  female  who presents for a diabetic foot exam.   Patient reports + presence of abnormal sensation to the feet, just sometimes, mostly at night.   Patient has no history of wounds on the feet.   Hx of foot surgery: none.   Shoe gear: casual shoes   This patient has documented high risk feet requiring routine maintenance secondary to diabetes.  Main concern today is need for toenail trimming. Routine trimming helps.        Primary care doctor is: Clarisse Richardson MD  Patient last saw primary care doctor on:    Chief Complaint   Patient presents with    Type II diabetes mellitus with neurological manifestations     bilateral pcp Jc 8/21/18    Foot Pain     both feet     Vitals:    10/11/18 0725   BP: (!) 145/81   Pulse: 89   Weight: (!) 167.8 kg (370 lb)   Height: 5' 5" (1.651 m)   PainSc:   8       Past Medical History:   Diagnosis Date    Asthma     Diabetes mellitus type II     DJD (degenerative joint disease) of knee 6/19/2014    Heartburn     Hyperlipidemia     Morbid obesity     Sleep apnea          Current Outpatient Medications on File Prior to Visit   Medication Sig Dispense Refill    aspirin (ECOTRIN) 81 MG EC tablet Take 1 tablet by mouth every morning. prevents heart attacks and strokes      atorvastatin (LIPITOR) 20 MG tablet Take 1 tablet (20 mg total) by mouth once daily. 30 tablet 6    blood sugar diagnostic Strp Freestyle light strips and lancets 100 each 6    blood sugar diagnostic Strp Check glucose four times daily Strips and lancets covered by insurance E11.9 - One touch 120 each 6    blood-glucose meter (FREESTYLE SYSTEM KIT) kit Use as instructed 1 each 0    blood-glucose meter kit Check glucose four times daily E11.9 meter covered by insurance One Touch 1 each 0    celecoxib (CELEBREX) 200 MG capsule TAKE 1 CAPSULE BY MOUTH DAILY 30 capsule 1    diclofenac sodium (VOLTAREN) 1 % Gel Apply 2 g topically once daily. 1 Tube 3    " "diethylpropion 75 mg TbSR Take 75 mg by mouth once daily. 30 tablet 1    ergocalciferol (ERGOCALCIFEROL) 50,000 unit Cap One weekly for 8 weeks then 2,000 IU daily OTC 8 capsule 0    FLUoxetine (PROZAC) 20 MG capsule Take 1 capsule (20 mg total) by mouth once daily. 30 capsule 6    fluticasone (FLONASE) 50 mcg/actuation nasal spray 1 spray by Each Nare route 2 (two) times daily as needed for Rhinitis. 15 g 0    insulin detemir U-100 (LEVEMIR FLEXPEN) 100 unit/mL (3 mL) SubQ InPn pen Inject 30 Units into the skin every evening. Inject 40 units every evening 1 Box 6    insulin lispro (HUMALOG KWIKPEN) 100 unit/mL InPn pen 11 Units before meals plus sliding scale 1 Box 6    lisinopril (PRINIVIL,ZESTRIL) 2.5 MG tablet One daily for proteinuria. 30 tablet 6    metFORMIN (GLUCOPHAGE-XR) 500 MG 24 hr tablet Take 2 tablets (1,000 mg total) by mouth 2 (two) times daily. 360 tablet 6    neomycin-polymyxin-hydrocortisone (CORTISPORIN) 3.5-10,000-1 mg/mL-unit/mL-% otic suspension Place 3 drops into the right ear 3 (three) times daily. 10 mL 0    omeprazole (PRILOSEC) 20 MG capsule Take 1 capsule (20 mg total) by mouth every morning. 30 capsule 6    oxyCODONE-acetaminophen (PERCOCET)  mg per tablet Take 1 tablet by mouth every 8 (eight) hours as needed for Pain. 90 tablet 0    pen needle, diabetic 33 gauge x 5/32" Ndle 1 application by Misc.(Non-Drug; Combo Route) route 4 (four) times daily with meals and nightly. 100 each 6    VENTOLIN HFA 90 mcg/actuation inhaler USE 2 PUFFS EVERY 4 TO 6 HOURS AS NEEDED FOR SHORTNESS OF BREATH OR WHEEZE 18 g 10    vitamin D 185 MG Tab Take 5,000 mg by mouth once daily.       No current facility-administered medications on file prior to visit.        Review of patient's allergies indicates:  No Known Allergies          Social History     Socioeconomic History    Marital status: Single     Spouse name: Not on file    Number of children: Not on file    Years of education: " Not on file    Highest education level: Not on file   Social Needs    Financial resource strain: Not on file    Food insecurity - worry: Not on file    Food insecurity - inability: Not on file    Transportation needs - medical: Not on file    Transportation needs - non-medical: Not on file   Occupational History    Not on file   Tobacco Use    Smoking status: Never Smoker    Smokeless tobacco: Never Used   Substance and Sexual Activity    Alcohol use: Yes     Comment: occasionally     Drug use: No    Sexual activity: Yes     Partners: Female     Birth control/protection: None   Other Topics Concern    Not on file   Social History Narrative    Disabled. The patient is the youngest of 6 siblings. Single. Lives with single-sex partner.                        ROS:  General ROS: negative  Respiratory ROS: no cough, shortness of breath, or wheezing  Cardiovascular ROS: no chest pain or dyspnea on exertion  Musculoskeletal ROS: negative  Neurological ROS:   negative for - gait disturbance, + numbness/tingling, seizures or tremors  Dermatological ROS: + nail changes, dry skin          LAST HbA1c:   Hemoglobin A1C   Date Value Ref Range Status   08/20/2018 10.7 (H) 4.0 - 5.6 % Final     Comment:     ADA Screening Guidelines:  5.7-6.4%  Consistent with prediabetes  >or=6.5%  Consistent with diabetes  High levels of fetal hemoglobin interfere with the HbA1C  assay. Heterozygous hemoglobin variants (HbS, HgC, etc)do  not significantly interfere with this assay.   However, presence of multiple variants may affect accuracy.     05/17/2018 7.6 (H) 4.0 - 5.6 % Final     Comment:     According to ADA guidelines, hemoglobin A1c <7.0% represents  optimal control in non-pregnant diabetic patients. Different  metrics may apply to specific patient populations.   Standards of Medical Care in Diabetes-2016.  For the purpose of screening for the presence of diabetes:  <5.7%     Consistent with the absence of diabetes  5.7-6.4%   "Consistent with increasing risk for diabetes   (prediabetes)  >or=6.5%  Consistent with diabetes  Currently, no consensus exists for use of hemoglobin A1c  for diagnosis of diabetes for children.  This Hemoglobin A1c assay has significant interference with fetal   hemoglobin   (HbF). The results are invalid for patients with abnormal amounts of   HbF,   including those with known Hereditary Persistence   of Fetal Hemoglobin. Heterozygous hemoglobin variants (HbAS, HbAC,   HbAD, HbAE, HbA2) do not significantly interfere with this assay;   however, presence of multiple variants in a sample may impact the %   interference.     03/19/2018 7.6 (H) 4.0 - 5.6 % Final     Comment:     According to ADA guidelines, hemoglobin A1c <7.0% represents  optimal control in non-pregnant diabetic patients. Different  metrics may apply to specific patient populations.   Standards of Medical Care in Diabetes-2016.  For the purpose of screening for the presence of diabetes:  <5.7%     Consistent with the absence of diabetes  5.7-6.4%  Consistent with increasing risk for diabetes   (prediabetes)  >or=6.5%  Consistent with diabetes  Currently, no consensus exists for use of hemoglobin A1c  for diagnosis of diabetes for children.  This Hemoglobin A1c assay has significant interference with fetal   hemoglobin   (HbF). The results are invalid for patients with abnormal amounts of   HbF,   including those with known Hereditary Persistence   of Fetal Hemoglobin. Heterozygous hemoglobin variants (HbAS, HbAC,   HbAD, HbAE, HbA2) do not significantly interfere with this assay;   however, presence of multiple variants in a sample may impact the %   interference.           EXAM:   Vitals:    10/11/18 0725   BP: (!) 145/81   Pulse: 89   Weight: (!) 167.8 kg (370 lb)   Height: 5' 5" (1.651 m)       General: Pt. is well-developed, well-nourished, appears stated age, in no acute distress, alert and oriented x 3.      Vascular: Dorsalis pedis and " posterior tibial pulses are 2/4 bilaterally. 3 secs capillary refill time and toes are warm to touch.  There is reduced hair growth on the feet.  There is 1+ edema.  no varicosities.      Neurological:  Light touch, proprioception, and sharp/dull sensation are all intact bilaterally. Protective threshold with the [place] monofilament is diminished bilaterally.     Dermatological:  The skin is intact, warm.  The toenails  1-5 L and 1-5 R  are greenish- yellow, long by 5-6mm and thick by 2-3mm with subungual debris  .   There are no open wounds. There is no ecchymoses.  no erythema noted.   Skin diffusely dry on the soles     Interdigital spaces are intact, no fissures    Skin atrophic, thin, dry and mildly hyperpigmented.     Musculoskeletal:  Muscle strength is 5/5 in all groups bilaterally.  Metatarsophalangeal, subtalar, and ankle range of motion are intact without crepitus.           Assessment / Plan:    Problem List Items Addressed This Visit     None      Visit Diagnoses     Type II diabetes mellitus with neurological manifestations    -  Primary    Bilateral lower extremity edema        Onychomycosis due to dermatophyte              I counseled the patient on the patient's conditions, their implications and medical management.     Shoe inspection. Diabetic Foot Education. Patient reminded of the importance of good nutrition and blood sugar control to help prevent podiatric complications of diabetes. Patient instructed on proper foot hygiene. We discussed wearing proper shoe gear, daily foot inspections, never walking without protective shoe gear, never putting sharp instruments to feet, and routine diabetic foot exam and care every 3-6 months to prevent complications.      Long discussion with patient regarding appropriate, supportive and comfortable shoes. Recommended SAS/[employer] style shoe brands with adequate arch supports to alleviate abnormal pressure and improve stability of foot while  "walking. Avoid flat shoes and barefoot walking as these will exacerbate or worsen symptoms.     Discussed regular and routine moisturizer to skin of both feet to help improve dry skin. Advised to apply twice daily until resolution of symptoms. Avoid between toes.     Routine Foot Care  Date/Time: 10/11/2018 7:53 AM  Performed by: Chasity Medellin DPM  Authorized by: Chasity Medellin DPM     Time out: Immediately prior to procedure a "time out" was called to verify the correct patient, procedure, equipment, support staff and site/side marked as required.    Consent Done?:  Yes (Verbal)  Hyperkeratotic Skin Lesions?: No      Nail Care Type:  Debride  Location(s): All  (Left 1st Toe, Left 3rd Toe, Left 2nd Toe, Left 4th Toe, Left 5th Toe, Right 1st Toe, Right 2nd Toe, Right 3rd Toe, Right 4th Toe and Right 5th Toe)  Patient tolerance:  Patient tolerated the procedure well with no immediate complications        This patient has documented high risk feet requiring routine maintenance secondary to diabetes     follow up 3-4 months or sooner if concerned.         "

## 2018-10-23 ENCOUNTER — OFFICE VISIT (OUTPATIENT)
Dept: PAIN MEDICINE | Facility: CLINIC | Age: 53
End: 2018-10-23
Payer: MEDICARE

## 2018-10-23 ENCOUNTER — CLINICAL SUPPORT (OUTPATIENT)
Dept: OPHTHALMOLOGY | Facility: CLINIC | Age: 53
End: 2018-10-23
Payer: MEDICARE

## 2018-10-23 VITALS
HEIGHT: 65 IN | WEIGHT: 293 LBS | TEMPERATURE: 98 F | HEART RATE: 72 BPM | SYSTOLIC BLOOD PRESSURE: 130 MMHG | RESPIRATION RATE: 18 BRPM | DIASTOLIC BLOOD PRESSURE: 83 MMHG | BODY MASS INDEX: 48.82 KG/M2

## 2018-10-23 DIAGNOSIS — M47.816 SPONDYLOSIS OF LUMBAR REGION WITHOUT MYELOPATHY OR RADICULOPATHY: Primary | ICD-10-CM

## 2018-10-23 DIAGNOSIS — M47.816 FACET ARTHRITIS OF LUMBAR REGION: ICD-10-CM

## 2018-10-23 DIAGNOSIS — G89.4 CHRONIC PAIN DISORDER: ICD-10-CM

## 2018-10-23 DIAGNOSIS — Z96.651 STATUS POST TOTAL RIGHT KNEE REPLACEMENT: ICD-10-CM

## 2018-10-23 DIAGNOSIS — M25.562 BILATERAL CHRONIC KNEE PAIN: ICD-10-CM

## 2018-10-23 DIAGNOSIS — Z79.891 ENCOUNTER FOR LONG-TERM OPIATE ANALGESIC USE: ICD-10-CM

## 2018-10-23 DIAGNOSIS — M17.0 PRIMARY OSTEOARTHRITIS OF BOTH KNEES: ICD-10-CM

## 2018-10-23 DIAGNOSIS — M25.561 BILATERAL CHRONIC KNEE PAIN: ICD-10-CM

## 2018-10-23 DIAGNOSIS — G89.29 BILATERAL CHRONIC KNEE PAIN: ICD-10-CM

## 2018-10-23 DIAGNOSIS — M79.10 MYALGIA: ICD-10-CM

## 2018-10-23 DIAGNOSIS — M51.36 DDD (DEGENERATIVE DISC DISEASE), LUMBAR: ICD-10-CM

## 2018-10-23 DIAGNOSIS — M47.816 SPONDYLOSIS OF LUMBAR REGION WITHOUT MYELOPATHY OR RADICULOPATHY: ICD-10-CM

## 2018-10-23 PROCEDURE — 99214 OFFICE O/P EST MOD 30 MIN: CPT | Mod: S$PBB,,, | Performed by: NURSE PRACTITIONER

## 2018-10-23 PROCEDURE — 99999 PR PBB SHADOW E&M-EST. PATIENT-LVL III: CPT | Mod: PBBFAC,,, | Performed by: NURSE PRACTITIONER

## 2018-10-23 PROCEDURE — 99213 OFFICE O/P EST LOW 20 MIN: CPT | Mod: PBBFAC | Performed by: NURSE PRACTITIONER

## 2018-10-23 RX ORDER — OXYCODONE AND ACETAMINOPHEN 10; 325 MG/1; MG/1
1 TABLET ORAL EVERY 8 HOURS PRN
Qty: 90 TABLET | Refills: 0 | Status: SHIPPED | OUTPATIENT
Start: 2018-10-23 | End: 2019-08-14 | Stop reason: SDUPTHER

## 2018-10-23 NOTE — PROGRESS NOTES
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  Standing AP knees, lateral view of both knees and sunrise view of both patella. Study compared to May 2015. Postop change of bilateral knee replacement. The prosthetic components are in satisfactory position. Erosive changes involving the patella   again evident bilaterally.    Impression no significant change.      Xray Bilateral Knee 05/25/2015:  There are bilateral total knee arthroplasties with posterior resurfacing of the patella. As observed on 12/17/2014 there is a fracture of the left patella in the parasagittal plane.    Heterotopic bone is evident about each knee.    I detect no dislocation, unusual radiopaque retained foreign body, lytic or blastic lesion, or chondrocalcinosis.    Cervical MRI 4/24/2018:    Narrative     EXAMINATION:  MRI CERVICAL SPINE WITHOUT CONTRAST    CLINICAL HISTORY:  Neck pain, first study;.  Cervicalgia.    TECHNIQUE:  Multiplanar, multisequence MR images of the cervical spine were acquired without the administration of contrast.    COMPARISON:  None.    FINDINGS:  There is reversal of the normal cervical lordosis which could be related to patient positioning versus muscle spasm.    Cervical vertebral body heights are well maintained without evidence of acute fracture or dislocation.  Marrow signal is unremarkable.    Spinal canal contents are unremarkable.  No  abnormal masses or collections.    Individual levels as detailed below:    C2-3: No significant spinal canal stenosis or neuroforaminal narrowing.    C3-4: Facet arthropathy.  No significant spinal canal stenosis or neuroforaminal narrowing.    C4-5: Facet arthropathy.  Small broad-based disc osteophyte complex.  Mild right neuroforaminal narrowing.  Mild spinal canal stenosis.    C5-6: Facet arthropathy.  Uncovertebral joint spurring.  Broad-based posterior disc osteophyte complex.  Mild right neuroforaminal narrowing.  Mild spinal canal stenosis.    C6-7: Facet arthropathy.  No significant spinal canal stenosis or neuroforaminal narrowing.    C7-T1: No significant spinal canal stenosis or neuroforaminal narrowing.    Paraspinal muscles are unremarkable.  Soft tissues are intact.   Impression       Mild multilevel cervical spondylosis with mild spinal canal stenosis and mild right neuroforaminal narrowing at C4-5 and C5-6.       Lab Results   Component Value Date    HGBA1C 10.7 (H) 08/20/2018     Lab Results   Component Value Date    CHOL 152 05/17/2018    CHOL 191 05/09/2017    CHOL 200 (H) 12/15/2016     Lab Results   Component Value Date    HDL 50 05/17/2018    HDL 48 05/09/2017    HDL 40 12/15/2016     Lab Results   Component Value Date    LDLCALC 91.0 05/17/2018    LDLCALC 129.0 05/09/2017    LDLCALC 141.0 12/15/2016     Lab Results   Component Value Date    TRIG 55 05/17/2018    TRIG 70 05/09/2017    TRIG 95 12/15/2016     Lab Results   Component Value Date    CHOLHDL 32.9 05/17/2018    CHOLHDL 25.1 05/09/2017    CHOLHDL 20.0 12/15/2016         Past Medical History:   Diagnosis Date    Asthma     Diabetes mellitus type II     DJD (degenerative joint disease) of knee 6/19/2014    Heartburn     Hyperlipidemia     Morbid obesity     Sleep apnea      Past Surgical History:   Procedure Laterality Date    BLOCK, NERVE, INTERCOSTAL, SINGLE Left 4/2/2014    [facility]     BLOCK-NERVE-MEDIAL BRANCH-LUMBAR Bilateral 3/1/2016    [facility]    BLOCK-NERVE-MEDIAL BRANCH-LUMBAR Bilateral 2/16/2016    [facility] at [facility]    CARPAL TUNNEL RELEASE      CARPAL TUNNEL RELEASE  1980s    left    CARPAL TUNNEL RELEASE  2012    right    ESOPHAGOGASTRODUODENOSCOPY (EGD) N/A 12/19/2016    [facility] (2ND F[facility])    JOINT REPLACEMENT Bilateral     with 2 revisions on rt    KNEE SURGERY  3/2010    orthroscope    KNEE SURGERY  6-19-14    left TKR    RADIOFREQUENCY ABLATION OF LUMBAR MEDIAL BRANCH NERVE AT SINGLE LEVEL Left 6/26/2018    Procedure: RADIOFREQUENCY ABLATION, NERVE, MEDIAL BRANCH, LUMBAR, 1 LEVEL;  Surgeon: Lina Wagner MD;  Location: [facility];  Service: Pain Management;  Laterality: Left;  Left Cooled RFA @ L2,3,4,5  [id number]  with Sedation    1 of 2    RADIOFREQUENCY ABLATION OF LUMBAR MEDIAL BRANCH NERVE AT SINGLE LEVEL Right 7/10/2018    Procedure: RADIOFREQUENCY ABLATION, NERVE, MEDIAL BRANCH, LUMBAR, 1 LEVEL;  Surgeon: Lina Wagner MD;  Location: [facility];  Service: Pain Management;  Laterality: Right;  RIght Cooled RFA @ L2,3,4,5  [id number] with Sedation    2 of 2    RADIOFREQUENCY ABLATION, NERVE, MEDIAL BRANCH, LUMBAR, 1 LEVEL Right 7/10/2018    [facility] by [facility] at [facility]    RADIOFREQUENCY ABLATION, NERVE, MEDIAL BRANCH, LUMBAR, 1 LEVEL Left 6/26/2018    Performed by [facility] at [facility]    RADIOFREQUENCY THERMOCOAGULATION (RFTC)-NERVE-MEDIAN BRANCH-LUMBAR Right 1/9/2018    [facility] at [facility]    RADIOFREQUENCY THERMOCOAGULATION (RFTC)-NERVE-MEDIAN BRANCH-LUMBAR Left 12/26/2017    Performed by [facility] at [facility]    RADIOFREQUENCY THERMOCOAGULATION (RFTC)-NERVE-MEDIAN BRANCH-LUMBAR Right 10/19/2016    [facility] at  [facility] PAIN MGT    RADIOFREQUENCY THERMOCOAGULATION (RFTC)-NERVE-MEDIAN BRANCH-LUMBAR Left 10/5/2016    Performed by [facility] at [facility] PA[facility]GT    RADIOFREQUENCY THERMOCOAGULATION (RFTC)-NERVE-MEDIAN BRANCH-LUMBAR Right 4/20/2016    [facility]    RADIOFREQUENCY THERMOCOAGULATION (RFTC)-NERVE-MEDIAN BRANCH-LUMBAR Left 4/6/2016    Per[facility] at [facility]    RADIOFREQUENCY THERMOCOAGULATION (RFTC)-NERVE-MEDIAN BRANCH-LUMBAR/COOLED Right 5/9/2017    [facility] at [facility]    RADIOFREQUENCY THERMOCOAGULATION (RFTC)-NERVE-MEDIAN BRANCH-LUMBAR/COOLED Left 4/26/2017    Per[facility] at [facility]    RELEASE, TRIGGER FINGER Right 2/4/2013    [facility] 1ST FLR    REPLACEMENT-KNEE-TOTAL Left 6/19/2014    Performed by Theodore Swan MD at [facility] OR 2ND FLR    Revision Carpal Tunnel-Right Right 12/14/2015    [facility] OR    REVISION-ARTHROPLASTY-KNEE-TOTAL Right 11/4/2014    Performed by [facility] Sawn [facility] at [facility] OR 2ND FLR     Family History   Problem Relation Age of Onset    Diabetes Mother     Hypertension Mother     Cataracts Mother     Diabetes Father     Cataracts Father     Coronary artery disease Brother     Amblyopia Neg Hx     Blindness Neg Hx     Cancer Neg Hx     Glaucoma Neg Hx     Macular degeneration Neg Hx     Retinal detachment Neg Hx     Strabismus Neg Hx     Stroke Neg Hx     Thyroid disease Neg Hx      Social History     Socioeconomic History    Marital status: Single     Spouse name: Not on file    Number of children: Not on file    Years of education: Not on file    Highest education level: Not on file   Social Needs    Financial resource strain: Not on file    Food insecurity - worry: Not on file    Food insecurity - inability: Not on file    Transportation needs  - medical: Not on file    Transportation needs - non-medical: Not on file   Occupational History    Not on file   Tobacco Use    Smoking status: Never Smoker    Smokeless tobacco: Never Used   Substance and Sexual Activity    Alcohol use: Yes     Comment: occasionally     Drug use: No    Sexual activity: Yes     Partners: Female     Birth control/protection: None   Other Topics Concern    Not on file   Social History Narrative    Disabled. The patient is the youngest of 6 siblings. Single. Lives with single-sex partner.                  Review of patient's allergies indicates:  No Known Allergies       Medication List           Accurate as of 10/23/18  9:05 AM. If you have any questions, ask your nurse or doctor.               CONTINUE taking these medications    aspirin 81 MG EC tablet  Commonly known as:  ECOTRIN     atorvastatin 20 MG tablet  Commonly known as:  LIPITOR  Take 1 tablet (20 mg total) by mouth once daily.     * blood sugar diagnostic Strp  Freestyle light strips and lancets     * blood sugar diagnostic Strp  Check glucose four times daily Strips and lancets covered by insurance E11.9 - One touch     * blood-glucose meter kit  Commonly known as:  FREESTYLE SYSTEM KIT  Use as instructed     * blood-glucose meter kit  Check glucose four times daily E11.9 meter covered by insurance One Touch     celecoxib 200 MG capsule  Commonly known as:  CeleBREX  TAKE 1 CAPSULE BY MOUTH DAILY     diclofenac sodium 1 % Gel  Commonly known as:  VOLTAREN  Apply 2 g topically once daily.     diethylpropion 75 mg Tbsr  Take 75 mg by mouth once daily.     ergocalciferol 50,000 unit Cap  Commonly known as:  ERGOCALCIFEROL  One weekly for 8 weeks then 2,000 IU daily OTC     FLUoxetine 20 MG capsule  Commonly known as:  PROZAC  Take 1 capsule (20 mg total) by mouth once daily.     fluticasone 50 mcg/actuation nasal spray  Commonly known as:  FLONASE  1 spray by Each Nare route 2 (two) times daily as needed for  "Rhinitis.     insulin detemir U-100 100 unit/mL (3 mL) Inpn pen  Commonly known as:  LEVEMIR FLEXPEN  Inject 30 Units into the skin every evening. Inject 40 units every evening     insulin lispro 100 unit/mL Inpn pen  Commonly known as:  HUMALOG KWIKPEN  11 Units before meals plus sliding scale     lisinopril 2.5 MG tablet  Commonly known as:  PRINIVIL,ZESTRIL  One daily for proteinuria.     metFORMIN 500 MG 24 hr tablet  Commonly known as:  GLUCOPHAGE-XR  Take 2 tablets (1,000 mg total) by mouth 2 (two) times daily.     neomycin-polymyxin-hydrocortisone 3.5-10,000-1 mg/mL-unit/mL-% otic suspension  Commonly known as:  CORTISPORIN  Place 3 drops into the right ear 3 (three) times daily.     omeprazole 20 MG capsule  Commonly known as:  PRILOSEC  Take 1 capsule (20 mg total) by mouth every morning.     oxyCODONE-acetaminophen  mg per tablet  Commonly known as:  PERCOCET  Take 1 tablet by mouth every 8 (eight) hours as needed for Pain.     pen needle, diabetic 33 gauge x 5/32" Ndle  1 application by Misc.(Non-Drug; Combo Route) route 4 (four) times daily with meals and nightly.     VENTOLIN HFA 90 mcg/actuation inhaler  Generic drug:  albuterol  USE 2 PUFFS EVERY 4 TO 6 HOURS AS NEEDED FOR SHORTNESS OF BREATH OR WHEEZE     vitamin D 1000 units Tab  Commonly known as:  VITAMIN D3         * This list has 4 medication(s) that are the same as other medications prescribed for you. Read the directions carefully, and ask your doctor or other care provider to review them with you.                  REVIEW OF SYSTEMS:    GENERAL:  Patient is attempting to lose weight.  RESPIRATORY:  Negative for cough, wheezing or shortness of breath, patient denies any recent URI.  CARDIOVASCULAR:  Negative for chest pain, leg swelling or palpitations.  GI:  Negative for abdominal discomfort, blood in stools or black stools or change in bowel habits, occasional constipation.  MUSCULOSKELETAL:  See HPI.  SKIN:  Negative for lesions, rash, " "and itching.  PSYCH:  No mood disorder or recent psychosocial stressors.  Patient's sleep is disturbed secondary to pain (patient reports also that she has insomnia).  HEMATOLOGY/LYMPHOLOGY:  Negative for prolonged bleeding, bruising easily or swollen nodes.  81 mg aspirin  ENDO: Patient has a history of diabetes  NEURO:   No history of headaches, syncope, paralysis, seizures or tremors.  All other reviewed and negative other than HPI.    OBJECTIVE:    Resp 18   Ht 5' 5" (1.651 m)   Wt (!) 171.4 kg (377 lb 13.9 oz)   BMI 62.88 kg/m²     PHYSICAL EXAMINATION:    GENERAL: Well appearing, in no acute distress, alert and oriented x3.   PSYCH:  Mood and affect appropriate.  SKIN: Skin color, texture, turgor normal, no rashes or lesions.  HEAD/FACE:  Normocephalic, atraumatic.   CV: RRR with palpation of the radial artery.  PULM: No evidence of respiratory difficulty, symmetric chest rise.  NECK: There is pain with palpation over cervical paraspinal and trapezius muscles bilaterally.  There is limited ROM on extension and lateral rotation.  Positive facet loading bilaterally.  Spurling is negative bilaterally.  BACK: Negative SLR bilaterally. There is pain to palpation over the lumbar facet joints and paraspinals on the left.  Limited ROM with pain on flexion and extension.  Positive facet loading bilaterally.  EXTREMITIES: Pain with palpation to bilateral SI joints.  JENNA is negative bilaterally. Well-healed midline scars to bilateral knees.  Painful extension and flexion of bilateral knees. Medial and lateral joint line tenderness to bilateral knees.  MUSCULOSKELETAL: Bilateral upper and lower extremity strength is normal and symmetric.  No atrophy or tone abnormalities are noted.  NEURO: Bilateral lower extremity coordination and muscle stretch reflexes are physiologic and symmetric.  Plantar response are downgoing. No clonus.  No loss of sensation is noted.  GAIT: Antalgic.      Assessment:       Encounter " Diagnoses   Name Primary?    Spondylosis of lumbar region without myelopathy or radiculopathy Yes    Facet arthritis of lumbar region     DDD (degenerative disc disease), lumbar     Status post total right knee replacement     Myalgia     Encounter for long-term opiate analgesic use          Plan:       - Previous imaging was reviewed and discussed with the patient today.     - Continue oxycodone-acetaminophen 10/325 mg one tablet every 8 hours PRN pain, #90, 0 refills.  Will send for 10/26/18.    - The patient is here today for a refill of current pain medications and they believe these provide effective pain control and improvements in quality of life.  The patient notes no serious side effects, and feels the benefits outweigh the risks.  The patient was reminded of the pain contract that they signed previously as well as the risks and benefits of the medication including possible death.  The updated [place] [employer] prescription monitoring program was reviewed, and the patient has been found to be compliant with current treatment plan.     - UDS from 8/1/2018 reviewed and consistent.  No UDS today.    - We discussed the important of diet and exercise.  I provided her with a NIH book.    - Continue topical pain cream PRN up to 5x/day.    - RTC in 1 month or sooner if needed.     - Dr. Wagner was consulted on the patient and agrees with this plan.      The above plan and management options were discussed at length with patient. Patient is in agreement with the above and verbalized understanding.     Virginia Sanchez, EMILY  10/23/2018

## 2018-11-06 ENCOUNTER — DOCUMENTATION ONLY (OUTPATIENT)
Dept: BARIATRICS | Facility: CLINIC | Age: 53
End: 2018-11-06

## 2018-11-26 ENCOUNTER — OFFICE VISIT (OUTPATIENT)
Dept: PAIN MEDICINE | Facility: CLINIC | Age: 53
End: 2018-11-26
Payer: COMMERCIAL

## 2018-11-26 VITALS
HEIGHT: 65 IN | WEIGHT: 293 LBS | HEART RATE: 90 BPM | BODY MASS INDEX: 48.82 KG/M2 | RESPIRATION RATE: 18 BRPM | TEMPERATURE: 98 F | SYSTOLIC BLOOD PRESSURE: 132 MMHG | DIASTOLIC BLOOD PRESSURE: 85 MMHG

## 2018-11-26 DIAGNOSIS — Z79.891 ENCOUNTER FOR LONG-TERM OPIATE ANALGESIC USE: ICD-10-CM

## 2018-11-26 DIAGNOSIS — Z96.651 STATUS POST TOTAL RIGHT KNEE REPLACEMENT: ICD-10-CM

## 2018-11-26 DIAGNOSIS — M17.0 PRIMARY OSTEOARTHRITIS OF BOTH KNEES: ICD-10-CM

## 2018-11-26 DIAGNOSIS — G89.4 CHRONIC PAIN DISORDER: ICD-10-CM

## 2018-11-26 DIAGNOSIS — M47.816 SPONDYLOSIS OF LUMBAR REGION WITHOUT MYELOPATHY OR RADICULOPATHY: ICD-10-CM

## 2018-11-26 DIAGNOSIS — M47.816 FACET ARTHRITIS OF LUMBAR REGION: ICD-10-CM

## 2018-11-26 DIAGNOSIS — M25.561 BILATERAL CHRONIC KNEE PAIN: ICD-10-CM

## 2018-11-26 DIAGNOSIS — G89.29 BILATERAL CHRONIC KNEE PAIN: ICD-10-CM

## 2018-11-26 DIAGNOSIS — M25.562 BILATERAL CHRONIC KNEE PAIN: ICD-10-CM

## 2018-11-26 DIAGNOSIS — M47.816 LUMBAR SPONDYLOSIS: Primary | ICD-10-CM

## 2018-11-26 DIAGNOSIS — M79.10 MYALGIA: ICD-10-CM

## 2018-11-26 DIAGNOSIS — M51.36 DDD (DEGENERATIVE DISC DISEASE), LUMBAR: ICD-10-CM

## 2018-11-26 PROCEDURE — 99999 PR PBB SHADOW E&M-EST. PATIENT-LVL III: CPT | Mod: PBBFAC,,, | Performed by: NURSE PRACTITIONER

## 2018-11-26 PROCEDURE — 99213 OFFICE O/P EST LOW 20 MIN: CPT | Mod: PBBFAC | Performed by: NURSE PRACTITIONER

## 2018-11-26 PROCEDURE — 20553 NJX 1/MLT TRIGGER POINTS 3/>: CPT | Mod: S$GLB,,, | Performed by: NURSE PRACTITIONER

## 2018-11-26 PROCEDURE — 99214 OFFICE O/P EST MOD 30 MIN: CPT | Mod: 25,S$GLB,, | Performed by: NURSE PRACTITIONER

## 2018-11-26 PROCEDURE — 20553 NJX 1/MLT TRIGGER POINTS 3/>: CPT | Mod: PBBFAC | Performed by: NURSE PRACTITIONER

## 2018-11-26 RX ORDER — OXYCODONE AND ACETAMINOPHEN 10; 325 MG/1; MG/1
1 TABLET ORAL EVERY 8 HOURS PRN
Qty: 90 TABLET | Refills: 0 | Status: SHIPPED | OUTPATIENT
Start: 2018-11-26 | End: 2018-12-19 | Stop reason: SDUPTHER

## 2018-11-26 RX ORDER — METHYLPREDNISOLONE ACETATE 40 MG/ML
40 INJECTION, SUSPENSION INTRA-ARTICULAR; INTRALESIONAL; INTRAMUSCULAR; SOFT TISSUE ONCE
Status: COMPLETED | OUTPATIENT
Start: 2018-11-26 | End: 2018-11-26

## 2018-11-26 RX ORDER — BUPIVACAINE HYDROCHLORIDE 2.5 MG/ML
9 INJECTION, SOLUTION EPIDURAL; INFILTRATION; INTRACAUDAL ONCE
Status: COMPLETED | OUTPATIENT
Start: 2018-11-26 | End: 2018-11-26

## 2018-11-26 RX ADMIN — METHYLPREDNISOLONE ACETATE 40 MG: 40 INJECTION, SUSPENSION INTRA-ARTICULAR; INTRALESIONAL; INTRAMUSCULAR; SOFT TISSUE at 09:11

## 2018-11-26 RX ADMIN — BUPIVACAINE HYDROCHLORIDE 22.5 MG: 2.5 INJECTION, SOLUTION EPIDURAL; INFILTRATION; INTRACAUDAL; PERINEURAL at 09:11

## 2018-11-26 NOTE — PROGRESS NOTES
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 and L5-S1. Mild anterior and marginal osteophyte formation seen throughout the lumbar spine . The oblique views demonstrate no   definite spondylolysis. There is facet joint osseous hypertrophy noted at L3-L4 and L4-L5.      Impression         The Significant spondylosis of the lumbar spine with grade 1 anterior listhesis L4 relative to L5.  Facet joint osseous hypertrophy L3-L4 and L4-5.      Electronically signed by: BLESSING ROE MD  Date: 09/21/15  Time: 09:56          X-ray knee bilateral 8/26/2015:  Standing AP knees, lateral view of both knees and sunrise view of both patella. Study compared to May 2015. Postop change of bilateral knee replacement. The prosthetic components are in satisfactory position. Erosive changes involving the patella   again evident bilaterally.    Impression no significant change.      Xray Bilateral Knee 05/25/2015:  There are bilateral total knee arthroplasties with posterior resurfacing of the patella. As observed on 12/17/2014 there is a fracture of the left patella in the parasagittal plane.    Heterotopic bone is evident about each knee.    I detect no dislocation, unusual radiopaque retained foreign body, lytic or blastic lesion, or chondrocalcinosis.    Cervical MRI 4/24/2018:    Narrative     EXAMINATION:  MRI CERVICAL SPINE WITHOUT  CONTRAST    CLINICAL HISTORY:  Neck pain, first study;.  Cervicalgia.    TECHNIQUE:  Multiplanar, multisequence MR images of the cervical spine were acquired without the administration of contrast.    COMPARISON:  None.    FINDINGS:  There is reversal of the normal cervical lordosis which could be related to patient positioning versus muscle spasm.    Cervical vertebral body heights are well maintained without evidence of acute fracture or dislocation.  Marrow signal is unremarkable.    Spinal canal contents are unremarkable.  No abnormal masses or collections.    Individual levels as detailed below:    C2-3: No significant spinal canal stenosis or neuroforaminal narrowing.    C3-4: Facet arthropathy.  No significant spinal canal stenosis or neuroforaminal narrowing.    C4-5: Facet arthropathy.  Small broad-based disc osteophyte complex.  Mild right neuroforaminal narrowing.  Mild spinal canal stenosis.    C5-6: Facet arthropathy.  Uncovertebral joint spurring.  Broad-based posterior disc osteophyte complex.  Mild right neuroforaminal narrowing.  Mild spinal canal stenosis.    C6-7: Facet arthropathy.  No significant spinal canal stenosis or neuroforaminal narrowing.    C7-T1: No significant spinal canal stenosis or neuroforaminal narrowing.    Paraspinal muscles are unremarkable.  Soft tissues are intact.   Impression       Mild multilevel cervical spondylosis with mild spinal canal stenosis and mild right neuroforaminal narrowing at C4-5 and C5-6.       Lab Results   Component Value Date    HGBA1C 10.7 (H) 08/20/2018     Lab Results   Component Value Date    CHOL 152 05/17/2018    CHOL 191 05/09/2017    CHOL 200 (H) 12/15/2016     Lab Results   Component Value Date    HDL 50 05/17/2018    HDL 48 05/09/2017    HDL 40 12/15/2016     Lab Results   Component Value Date    LDLCALC 91.0 05/17/2018    LDLCALC 129.0 05/09/2017    LDLCALC 141.0 12/15/2016     Lab Results   Component Value Date    TRIG 55 05/17/2018    TRIG  70 05/09/2017    TRIG 95 12/15/2016     Lab Results   Component Value Date    CHOLHDL 32.9 05/17/2018    CHOLHDL 25.1 05/09/2017    CHOLHDL 20.0 12/15/2016         Past Medical History:   Diagnosis Date    Asthma     Diabetes mellitus type II     DJD (degenerative joint disease) of knee 6/19/2014    Heartburn     Hyperlipidemia     Morbid obesity     Sleep apnea      Past Surgical History:   Procedure Laterality Date    BLOCK, NERVE, INTERCOSTAL, SINGLE Left 4/2/2014    Performed by Lina Wagner MD at [facility]    BLOCK-NERVE-MEDIAL BRANCH-LUMBAR Bilateral 3/1/2016    Performed by [facility] Wagner MD at [facility]    BLOCK-NERVE-MEDIAL BRANCH-LUMBAR Bilateral 2/16/2016    Performed by [facility] Wagner MD at [facility]    CARPAL TUNNEL RELEASE      CARPAL TUNNEL RELEASE  1980s    left    CARPAL TUNNEL RELEASE  2012    right    ESOPHAGOGASTRODUODENOSCOPY (EGD) N/A 12/19/2016    Performed by [facility]sh[facility]oud [facility] at [facility] (2ND FLR)    JOINT REPLACEMENT Bilateral     with 2 revisions on rt    KNEE SURGERY  3/2010    orthroscope    KNEE SURGERY  6-19-14    left TKR    RADIOFREQUENCY ABLATION OF LUMBAR MEDIAL BRANCH NERVE AT SINGLE LEVEL Left 6/26/2018    Procedure: RADIOFREQUENCY ABLATION, NERVE, MEDIAL BRANCH, LUMBAR, 1 LEVEL;  Surgeon: Lina Wagner MD;  Location: [facility];  Service: Pain Management;  Laterality: Left;  Left Cooled RFA @ L2,3,4,5  [id number]  with Sedation    1 of 2    RADIOFREQUENCY ABLATION OF LUMBAR MEDIAL BRANCH NERVE AT SINGLE LEVEL Right 7/10/2018    Procedure: RADIOFREQUENCY ABLATION, NERVE, MEDIAL BRANCH, LUMBAR, 1 LEVEL;  Surgeon: Lina Wagner MD;  Location: [facility];  Service: Pain Management;  Laterality: Right;  RIght Cooled RFA @ L2,3,4,5  [id number] with Sedation    2 of 2    RADIOFREQUENCY ABLATION, NERVE, MEDIAL BRANCH, LUMBAR, 1 LEVEL Right 7/10/2018    Performed by Lina Wagner MD at [facility]     RADIOFREQUENCY ABLATION, NERVE, MEDIAL BRANCH, LUMBAR, 1 LEVEL Left 6/26/2018    Performed by [facility] at [facility]    RADIOFREQUENCY THERMOCOAGULATION (RFTC)-NERVE-MEDIAN BRANCH-LUMBAR Right 1/9/2018    Performed by [facility] at [facility]    RADIOFREQUENCY THERMOCOAGULATION (RFTC)-NERVE-MEDIAN BRANCH-LUMBAR Left 12/26/2017    Performed by [facility] Wagner [facility] at [facility]    RADIOFREQUENCY THERMOCOAGULATION (RFTC)-NERVE-MEDIAN BRANCH-LUMBAR Right 10/19/2016    Performed by [facility] at [facility]    RADIOFREQUENCY THERMOCOAGULATION (RFTC)-NERVE-MEDIAN BRANCH-LUMBAR Left 10/5/2016    Performed by [facility] at [facility]    RADIOFREQUENCY THERMOCOAGULATION (RFTC)-NERVE-MEDIAN BRANCH-LUMBAR Right 4/20/2016    Performed by [facility] at [facility]    RADIOFREQUENCY THERMOCOAGULATION (RFTC)-NERVE-MEDIAN BRANCH-LUMBAR Left 4/6/2016    Performed by [facility] at [facility]    RADIOFREQUENCY THERMOCOAGULATION (RFTC)-NERVE-MEDIAN BRANCH-LUMBAR/COOLED Right 5/9/2017    Performed by [facility] Wagner MD at [facility]    RADIOFREQUENCY THERMOCOAGULATION (RFTC)-NERVE-MEDIAN BRANCH-LUMBAR/COOLED Left 4/26/2017    Performed by [facility] Wagner [facility] at [facility]    RELEASE, TRIGGER FINGER Right 2/4/2013    [facility]    REPLACEMENT-KNEE-TOTAL Left 6/19/2014    Performed [facility] Theodore Swan [facility]    Revision Carpal Tunnel-Right Right 12/14/2015    [facility] at [facility]    REVISION-ARTHROPLASTY-KNEE-TOTAL Right 11/4/2014    Per[facility]     Family History   Problem Relation Age of Onset    Diabetes Mother     Hypertension Mother     Cataracts Mother     Diabetes Father     Cataracts Father     Coronary artery disease Brother     Amblyopia Neg Hx     Blindness Neg Hx     Cancer  Neg Hx     Glaucoma Neg Hx     Macular degeneration Neg Hx     Retinal detachment Neg Hx     Strabismus Neg Hx     Stroke Neg Hx     Thyroid disease Neg Hx      Social History     Socioeconomic History    Marital status: Single     Spouse name: Not on file    Number of children: Not on file    Years of education: Not on file    Highest education level: Not on file   Social Needs    Financial resource strain: Not on file    Food insecurity - worry: Not on file    Food insecurity - inability: Not on file    Transportation needs - medical: Not on file    Transportation needs - non-medical: Not on file   Occupational History    Not on file   Tobacco Use    Smoking status: Never Smoker    Smokeless tobacco: Never Used   Substance and Sexual Activity    Alcohol use: Yes     Comment: occasionally     Drug use: No    Sexual activity: Yes     Partners: Female     Birth control/protection: None   Other Topics Concern    Not on file   Social History Narrative    Disabled. The patient is the youngest of 6 siblings. Single. Lives with single-sex partner.                  Review of patient's allergies indicates:  No Known Allergies       Medication List           Accurate as of 11/26/18  9:19 AM. If you have any questions, ask your nurse or doctor.               CONTINUE taking these medications    aspirin 81 MG EC tablet  Commonly known as:  ECOTRIN     atorvastatin 20 MG tablet  Commonly known as:  LIPITOR  Take 1 tablet (20 mg total) by mouth once daily.     * blood sugar diagnostic Strp  Freestyle light strips and lancets     * blood sugar diagnostic Strp  Check glucose four times daily Strips and lancets covered by insurance E11.9 - One touch     * blood-glucose meter kit  Commonly known as:  FREESTYLE SYSTEM KIT  Use as instructed     * blood-glucose meter kit  Check glucose four times daily E11.9 meter covered by insurance One Touch     celecoxib 200 MG capsule  Commonly known as:  CeleBREX  TAKE 1  "CAPSULE BY MOUTH DAILY     diclofenac sodium 1 % Gel  Commonly known as:  VOLTAREN  Apply 2 g topically once daily.     diethylpropion 75 mg Tbsr  Take 75 mg by mouth once daily.     ergocalciferol 50,000 unit Cap  Commonly known as:  ERGOCALCIFEROL  One weekly for 8 weeks then 2,000 IU daily OTC     FLUoxetine 20 MG capsule  Commonly known as:  PROZAC  Take 1 capsule (20 mg total) by mouth once daily.     fluticasone 50 mcg/actuation nasal spray  Commonly known as:  FLONASE  1 spray by Each Nare route 2 (two) times daily as needed for Rhinitis.     insulin detemir U-100 100 unit/mL (3 mL) Inpn pen  Commonly known as:  LEVEMIR FLEXPEN  Inject 30 Units into the skin every evening. Inject 40 units every evening     insulin lispro 100 unit/mL Inpn pen  Commonly known as:  HUMALOG KWIKPEN  11 Units before meals plus sliding scale     lisinopril 2.5 MG tablet  Commonly known as:  PRINIVIL,ZESTRIL  One daily for proteinuria.     metFORMIN 500 MG 24 hr tablet  Commonly known as:  GLUCOPHAGE-XR  Take 2 tablets (1,000 mg total) by mouth 2 (two) times daily.     neomycin-polymyxin-hydrocortisone 3.5-10,000-1 mg/mL-unit/mL-% otic suspension  Commonly known as:  CORTISPORIN  Place 3 drops into the right ear 3 (three) times daily.     omeprazole 20 MG capsule  Commonly known as:  PRILOSEC  Take 1 capsule (20 mg total) by mouth every morning.     pen needle, diabetic 33 gauge x 5/32" Ndle  1 application by Misc.(Non-Drug; Combo Route) route 4 (four) times daily with meals and nightly.     VENTOLIN HFA 90 mcg/actuation inhaler  Generic drug:  albuterol  USE 2 PUFFS EVERY 4 TO 6 HOURS AS NEEDED FOR SHORTNESS OF BREATH OR WHEEZE     vitamin D 1000 units Tab  Commonly known as:  VITAMIN D3         * This list has 4 medication(s) that are the same as other medications prescribed for you. Read the directions carefully, and ask your doctor or other care provider to review them with you.                  REVIEW OF SYSTEMS:    GENERAL:  " "Patient is attempting to lose weight.  RESPIRATORY:  Negative for cough, wheezing or shortness of breath, patient denies any recent URI.  CARDIOVASCULAR:  Negative for chest pain, leg swelling or palpitations.  GI:  Negative for abdominal discomfort, blood in stools or black stools or change in bowel habits, occasional constipation.  MUSCULOSKELETAL:  See HPI.  SKIN:  Negative for lesions, rash, and itching.  PSYCH:  No mood disorder or recent psychosocial stressors.  Patient's sleep is disturbed secondary to pain (patient reports also that she has insomnia).  HEMATOLOGY/LYMPHOLOGY:  Negative for prolonged bleeding, bruising easily or swollen nodes.  81 mg aspirin  ENDO: Patient has a history of diabetes  NEURO:   No history of headaches, syncope, paralysis, seizures or tremors.  All other reviewed and negative other than HPI.    OBJECTIVE:    /85   Pulse 90   Temp 98.1 °F (36.7 °C) (Oral)   Resp 18   Ht 5' 5" (1.651 m)   Wt (!) 166.3 kg (366 lb 10 oz)   BMI 61.01 kg/m²     PHYSICAL EXAMINATION:    GENERAL: Well appearing, in no acute distress, alert and oriented x3.   PSYCH:  Mood and affect appropriate.  SKIN: Skin color, texture, turgor normal, no rashes or lesions.  HEAD/FACE:  Normocephalic, atraumatic.   CV: RRR with palpation of the radial artery.  PULM: No evidence of respiratory difficulty, symmetric chest rise.  NECK: There is pain with palpation over cervical paraspinal and trapezius muscles bilaterally.  There is limited ROM on extension and lateral rotation.  Positive facet loading bilaterally.  Spurling is negative bilaterally.  BACK: Negative SLR bilaterally. There is pain to palpation over the lumbar facet joints and paraspinals bilaterally.  Limited ROM with pain on flexion and extension.  Positive facet loading bilaterally.  EXTREMITIES: Pain with palpation to bilateral SI joints.  JENNA is negative bilaterally. Well-healed midline scars to bilateral knees.  Painful extension and " flexion of bilateral knees. Medial and lateral joint line tenderness to bilateral knees.  MUSCULOSKELETAL: Bilateral upper and lower extremity strength is normal and symmetric.  No atrophy or tone abnormalities are noted.  NEURO: Bilateral lower extremity coordination and muscle stretch reflexes are physiologic and symmetric.  Plantar response are downgoing. No clonus.  No loss of sensation is noted.  GAIT: Antalgic.      Assessment:       Encounter Diagnoses   Name Primary?    Spondylosis of lumbar region without myelopathy or radiculopathy     Facet arthritis of lumbar region     DDD (degenerative disc disease), lumbar     Primary osteoarthritis of both knees     Bilateral chronic knee pain     Status post total right knee replacement     Chronic pain disorder     Encounter for long-term opiate analgesic use     Lumbar spondylosis Yes    Myalgia          Plan:       - Previous imaging was reviewed and discussed with the patient today.     - Will give TPIs today, as below.    - Schedule for left the right L2,3,4,5 RFAs after 12/26/18, 2 weeks apart.    - Continue oxycodone-acetaminophen 10/325 mg one tablet every 8 hours PRN pain, #90, 0 refills.  She can call for one additional refill.    - The patient is here today for a refill of current pain medications and they believe these provide effective pain control and improvements in quality of life.  The patient notes no serious side effects, and feels the benefits outweigh the risks.  The patient was reminded of the pain contract that they signed previously as well as the risks and benefits of the medication including possible death.  The updated [place] Board [employer] prescription monitoring program was reviewed, and the patient has been found to be compliant with current treatment plan.     - UDS from 8/1/2018 reviewed and consistent.      - We discussed the important of diet and exercise.  I congratulated her on weight loss.    - Continue topical  pain cream PRN up to 5x/day.    - RTC 4 weeks after completion of procedures.    - Dr. Wagner evaluated the patient and agrees with this plan.      The above plan and management options were discussed at length with patient. Patient is in agreement with the above and verbalized understanding.     Virginia Sanchez, EMILY  11/26/2018     Trigger Point Injection:   The procedure was discussed with the patient including complications of nerve damage,  bleeding, infection, and failure of pain relief.   Trigger points were identified by palpation and marked. Alcohol prep of sites done. A mixture of 9mL 0.25% bupivacaine +40mg Depo-Medrol was prepared (10 mL total).   A 27-gauge needle was advanced to the point of maximal tenderness, and medication was injected after negative aspiration. All sites done in the same manner. Patient tolerated the procedure well and without complications. Sites injected included: lumbar paraspinals bilaterally and cervical paraspinals bilaterally (6 sites total).

## 2018-11-29 ENCOUNTER — PES CALL (OUTPATIENT)
Dept: ADMINISTRATIVE | Facility: CLINIC | Age: 53
End: 2018-11-29

## 2018-11-29 RX ORDER — FLUCONAZOLE 150 MG/1
TABLET ORAL
Qty: 3 TABLET | Refills: 1 | Status: SHIPPED | OUTPATIENT
Start: 2018-11-29 | End: 2019-05-06

## 2018-11-29 NOTE — TELEPHONE ENCOUNTER
----- Message from Gabriella Vidal sent at 11/29/2018 11:15 AM CST -----  Contact: pt   Name of Who is Calling: DONTAE MOON [[id number]]    What is the request in detail: Patient is requesting to have a prescription for DIFLUCAN sent to [id number] - [place], [place] - [address], patient states she normal gets three....Please contact to further discuss and advise      Can the clinic reply by [facility]: No   What Number to [facility] Back [facility]: [phone]

## 2018-11-29 NOTE — TELEPHONE ENCOUNTER
Pt states that she has a yeast infection and wanted diflucan called in she normally has to take 3 for relief

## 2018-11-30 RX ORDER — FLUCONAZOLE 150 MG/1
150 TABLET ORAL ONCE
Qty: 3 TABLET | Refills: 0 | OUTPATIENT
Start: 2018-11-30 | End: 2018-11-30

## 2018-11-30 RX ORDER — FLUCONAZOLE 150 MG/1
150 TABLET ORAL ONCE
Qty: 1 TABLET | Refills: 0 | Status: SHIPPED | OUTPATIENT
Start: 2018-11-30 | End: 2018-11-30

## 2018-12-05 ENCOUNTER — PES CALL (OUTPATIENT)
Dept: ADMINISTRATIVE | Facility: CLINIC | Age: 53
End: 2018-12-05

## 2018-12-13 RX ORDER — INSULIN LISPRO 100 [IU]/ML
INJECTION, SOLUTION INTRAVENOUS; SUBCUTANEOUS
Qty: 1 BOX | Refills: 6 | Status: SHIPPED | OUTPATIENT
Start: 2018-12-13 | End: 2019-01-22 | Stop reason: SDUPTHER

## 2018-12-19 ENCOUNTER — TELEPHONE (OUTPATIENT)
Dept: PAIN MEDICINE | Facility: CLINIC | Age: 53
End: 2018-12-19

## 2018-12-19 DIAGNOSIS — M25.561 BILATERAL CHRONIC KNEE PAIN: ICD-10-CM

## 2018-12-19 DIAGNOSIS — G89.29 BILATERAL CHRONIC KNEE PAIN: ICD-10-CM

## 2018-12-19 DIAGNOSIS — Z79.891 ENCOUNTER FOR LONG-TERM OPIATE ANALGESIC USE: ICD-10-CM

## 2018-12-19 DIAGNOSIS — Z96.651 STATUS POST TOTAL RIGHT KNEE REPLACEMENT: ICD-10-CM

## 2018-12-19 DIAGNOSIS — M51.36 DDD (DEGENERATIVE DISC DISEASE), LUMBAR: ICD-10-CM

## 2018-12-19 DIAGNOSIS — G89.4 CHRONIC PAIN DISORDER: ICD-10-CM

## 2018-12-19 DIAGNOSIS — M47.816 FACET ARTHRITIS OF LUMBAR REGION: ICD-10-CM

## 2018-12-19 DIAGNOSIS — M47.816 SPONDYLOSIS OF LUMBAR REGION WITHOUT MYELOPATHY OR RADICULOPATHY: ICD-10-CM

## 2018-12-19 DIAGNOSIS — M17.0 PRIMARY OSTEOARTHRITIS OF BOTH KNEES: ICD-10-CM

## 2018-12-19 DIAGNOSIS — M25.562 BILATERAL CHRONIC KNEE PAIN: ICD-10-CM

## 2018-12-19 NOTE — TELEPHONE ENCOUNTER
----- Message from Jane Clements sent at 12/19/2018  2:05 PM CST -----  Contact: pt            Name of Who is Calling: pt      What is the request in detail: calling in regards to a refill on Rx (oxyCODONE-acetaminophen (PERCOCET)  mg per tablet) for December 2018. Pt states she is scheduled for a procedure and is needing to get a new Rx      Can the clinic reply by [facility]: no      What Number to Call Back [facility]: [phone]

## 2018-12-19 NOTE — TELEPHONE ENCOUNTER
----- Message from Diaz Dobson sent at 12/19/2018 11:28 AM CST -----  Contact: DONTAE MOON [[id number]]      Can the clinic reply in [facility]: no      Please refill the medication(s) listed below. Please call the patient when the prescription(s) is ready for  at this phone number   [phone]      Medication #1 oxyCODONE-acetaminophen (PERCOCET)  mg per tablet    Preferred Pharmacy: [facility] - [address]

## 2018-12-20 DIAGNOSIS — M17.0 PRIMARY OSTEOARTHRITIS OF BOTH KNEES: ICD-10-CM

## 2018-12-20 DIAGNOSIS — Z96.651 STATUS POST TOTAL RIGHT KNEE REPLACEMENT: ICD-10-CM

## 2018-12-20 DIAGNOSIS — M51.36 DDD (DEGENERATIVE DISC DISEASE), LUMBAR: ICD-10-CM

## 2018-12-20 DIAGNOSIS — M47.816 SPONDYLOSIS OF LUMBAR REGION WITHOUT MYELOPATHY OR RADICULOPATHY: ICD-10-CM

## 2018-12-20 DIAGNOSIS — G89.4 CHRONIC PAIN DISORDER: ICD-10-CM

## 2018-12-20 DIAGNOSIS — G89.29 BILATERAL CHRONIC KNEE PAIN: ICD-10-CM

## 2018-12-20 DIAGNOSIS — M25.562 BILATERAL CHRONIC KNEE PAIN: ICD-10-CM

## 2018-12-20 DIAGNOSIS — Z79.891 ENCOUNTER FOR LONG-TERM OPIATE ANALGESIC USE: ICD-10-CM

## 2018-12-20 DIAGNOSIS — M25.561 BILATERAL CHRONIC KNEE PAIN: ICD-10-CM

## 2018-12-20 DIAGNOSIS — M47.816 FACET ARTHRITIS OF LUMBAR REGION: ICD-10-CM

## 2018-12-20 RX ORDER — OXYCODONE AND ACETAMINOPHEN 10; 325 MG/1; MG/1
1 TABLET ORAL EVERY 8 HOURS PRN
Qty: 90 TABLET | Refills: 0 | Status: SHIPPED | OUTPATIENT
Start: 2018-12-24 | End: 2018-12-20 | Stop reason: SDUPTHER

## 2018-12-20 RX ORDER — OXYCODONE AND ACETAMINOPHEN 10; 325 MG/1; MG/1
1 TABLET ORAL EVERY 8 HOURS PRN
Qty: 90 TABLET | Refills: 0 | Status: SHIPPED | OUTPATIENT
Start: 2018-12-24 | End: 2019-01-18 | Stop reason: SDUPTHER

## 2018-12-22 ENCOUNTER — HOSPITAL ENCOUNTER (EMERGENCY)
Facility: HOSPITAL | Age: 53
Discharge: HOME | End: 2018-12-22
Attending: EMERGENCY MEDICINE
Payer: MEDICARE

## 2018-12-22 VITALS
OXYGEN SATURATION: 97 % | BODY MASS INDEX: 48.82 KG/M2 | HEART RATE: 86 BPM | TEMPERATURE: 98 F | HEIGHT: 65 IN | WEIGHT: 293 LBS | SYSTOLIC BLOOD PRESSURE: 117 MMHG | DIASTOLIC BLOOD PRESSURE: 54 MMHG | RESPIRATION RATE: 20 BRPM

## 2018-12-22 DIAGNOSIS — R52 PAIN: ICD-10-CM

## 2018-12-22 DIAGNOSIS — R06.02 SOB (SHORTNESS OF BREATH): ICD-10-CM

## 2018-12-22 DIAGNOSIS — M17.12 ARTHRITIS OF LEFT KNEE: Primary | ICD-10-CM

## 2018-12-22 LAB
ALBUMIN SERPL BCP-MCNC: 3.4 G/DL
ALP SERPL-CCNC: 57 U/L
ALT SERPL W/O P-5'-P-CCNC: 16 U/L
ANION GAP SERPL CALC-SCNC: 6 MMOL/L
AST SERPL-CCNC: 17 U/L
BASOPHILS # BLD AUTO: 0.02 K/UL
BASOPHILS NFR BLD: 0.3 %
BILIRUB SERPL-MCNC: 0.5 MG/DL
BNP SERPL-MCNC: 39 PG/ML
BUN SERPL-MCNC: 14 MG/DL
CALCIUM SERPL-MCNC: 9.7 MG/DL
CHLORIDE SERPL-SCNC: 108 MMOL/L
CO2 SERPL-SCNC: 28 MMOL/L
CREAT SERPL-MCNC: 0.7 MG/DL
DIFFERENTIAL METHOD: ABNORMAL
EOSINOPHIL # BLD AUTO: 0.1 K/UL
EOSINOPHIL NFR BLD: 1.8 %
ERYTHROCYTE [DISTWIDTH] IN BLOOD BY AUTOMATED COUNT: 16.1 %
EST. GFR  (AFRICAN AMERICAN): >60 ML/MIN/1.73 M^2
EST. GFR  (NON AFRICAN AMERICAN): >60 ML/MIN/1.73 M^2
GLUCOSE SERPL-MCNC: 99 MG/DL
HCT VFR BLD AUTO: 34.6 %
HGB BLD-MCNC: 11 G/DL
IMM GRANULOCYTES # BLD AUTO: 0.02 K/UL
IMM GRANULOCYTES NFR BLD AUTO: 0.3 %
LYMPHOCYTES # BLD AUTO: 2.3 K/UL
LYMPHOCYTES NFR BLD: 28.9 %
MCH RBC QN AUTO: 28.6 PG
MCHC RBC AUTO-ENTMCNC: 31.8 G/DL
MCV RBC AUTO: 90 FL
MONOCYTES # BLD AUTO: 0.7 K/UL
MONOCYTES NFR BLD: 9 %
NEUTROPHILS # BLD AUTO: 4.8 K/UL
NEUTROPHILS NFR BLD: 59.7 %
NRBC BLD-RTO: 0 /100 WBC
PLATELET # BLD AUTO: 270 K/UL
PMV BLD AUTO: 11.2 FL
POTASSIUM SERPL-SCNC: 4.2 MMOL/L
PROT SERPL-MCNC: 7.5 G/DL
RBC # BLD AUTO: 3.85 M/UL
SODIUM SERPL-SCNC: 142 MMOL/L
TROPONIN I SERPL DL<=0.01 NG/ML-MCNC: <0.006 NG/ML
WBC # BLD AUTO: 8 K/UL

## 2018-12-22 PROCEDURE — 80053 COMPREHEN METABOLIC PANEL: CPT

## 2018-12-22 PROCEDURE — 93010 ELECTROCARDIOGRAM REPORT: CPT | Mod: ,,, | Performed by: INTERNAL MEDICINE

## 2018-12-22 PROCEDURE — 99284 EMERGENCY DEPT VISIT MOD MDM: CPT | Mod: ,,, | Performed by: EMERGENCY MEDICINE

## 2018-12-22 PROCEDURE — 93005 ELECTROCARDIOGRAM TRACING: CPT

## 2018-12-22 PROCEDURE — 99285 EMERGENCY DEPT VISIT HI MDM: CPT | Mod: 25

## 2018-12-22 PROCEDURE — 83880 ASSAY OF NATRIURETIC PEPTIDE: CPT

## 2018-12-22 PROCEDURE — 84484 ASSAY OF TROPONIN QUANT: CPT

## 2018-12-22 PROCEDURE — 85025 COMPLETE CBC W/AUTO DIFF WBC: CPT

## 2018-12-22 RX ORDER — ETODOLAC 500 MG/1
500 TABLET, FILM COATED ORAL 2 TIMES DAILY
Qty: 20 TABLET | Refills: 0 | Status: SHIPPED | OUTPATIENT
Start: 2018-12-22 | End: 2019-01-01

## 2018-12-23 NOTE — ED TRIAGE NOTES
53 year old female presents to the ED c/o left knee pain x 2 weeks. History of knee replacement. Also c/o SANCHEZ x 2 weeks.

## 2018-12-23 NOTE — ED PROVIDER NOTES
Encounter Date: 12/22/2018       History     Chief Complaint   Patient presents with    Shortness of Breath     Pt reports SOB on exertion x2 weeks. Denies chest pain.     Knee Pain     Pt reports left knee pain x2 weeks. Pt reports some increase in swelling to the area. Pt able to ambulate and bear weight on extremity.      52 yo female with a past medical history of Asthma, Diabetes Mellitus, Morbid Obesity, and Bilateral Knee OA with DJD presents for evaluation of left knee pain she states has been chronic, but worsened within the past 2 weeks. She denies trauma. She states she has been told by her PCP that she will nee a left knee replacement. She states that she has had some associated shortness of breath when she tries to ambulate due to the pain. She rates her pain as a 10/10 in severity and states it is non radiating, focused mostly on the medial aspect of the knee.           Review of patient's allergies indicates:   Allergen Reactions    [place]      Past Medical History:   Diagnosis Date    Asthma     Diabetes mellitus type II     DJD (degenerative joint disease) of knee 6/19/2014    Heartburn     Hyperlipidemia     Morbid obesity     Sleep apnea      Past Surgical History:   Procedure Laterality Date    BLOCK, NERVE, INTERCOSTAL, SINGLE Left 4/2/2014    Performed by Lina Wagner MD at [facility] MGT    BLOCK-NERVE-MEDIAL BRANCH-LUMBAR Bilateral 3/1/2016    Performed by L[facility] Wagner MD at [facility] MGT    BLOCK-NERVE-MEDIAL BRANCH-LUMBAR Bilateral 2/16/2016    Performed by [facility] at [facility] MGT    CARPAL TUNNEL RELEASE      CARPAL TUNNEL RELEASE  1980s    left    CARPAL TUNNEL RELEASE  2012    right    ESOPHAGOGASTRODUODENOSCOPY (EGD) N/A 12/19/2016    Performed by Gulshan Stroud MD at [facility] ENDO (2ND FLR)    JOINT REPLACEMENT Bilateral     with 2 revisions on rt    KNEE SURGERY  3/2010    orthroscope    KNEE SURGERY  6-19-14    left TKR     RADIOFREQUENCY ABLATION, NERVE, MEDIAL BRANCH, LUMBAR, 1 LEVEL Right 7/10/2018    Performed by [facility] at [facility]    RADIOFREQUENCY ABLATION, NERVE, MEDIAL BRANCH, LUMBAR, 1 LEVEL Left 6/26/2018    Performed by [facility] at [facility]    RADIOFREQUENCY THERMOCOAGULATION (RFTC)-NERVE-MEDIAN BRANCH-LUMBAR Right 1/9/2018    Performed by [facility] MD at [facility]    RADIOFREQUENCY THERMOCOAGULATION (RFTC)-NERVE-MEDIAN BRANCH-LUMBAR Left 12/26/2017    Performed by [facility] Wagner [facility] at [facility]    RADIOFREQUENCY THERMOCOAGULATION (RFTC)-NERVE-MEDIAN BRANCH-LUMBAR Right 10/19/2016    Performed by [facility] at [facility]    RADIOFREQUENCY THERMOCOAGULATION (RFTC)-NERVE-MEDIAN BRANCH-LUMBAR Left 10/5/2016    Performed by [facility] Wagner [facility] at [facility]    RADIOFREQUENCY THERMOCOAGULATION (RFTC)-NERVE-MEDIAN BRANCH-LUMBAR Right 4/20/2016    Performed by [facility] Wagner MD at [facility]    RADIOFREQUENCY THERMOCOAGULATION (RFTC)-NERVE-MEDIAN BRANCH-LUMBAR Left 4/6/2016    Performed by [facility] Wagner MD at [facility]    RADIOFREQUENCY THERMOCOAGULATION (RFTC)-NERVE-MEDIAN BRANCH-LUMBAR/COOLED Right 5/9/2017    Performed by [facility] Wagner MD at [facility]    RADIOFREQUENCY THERMOCOAGULATION (RFTC)-NERVE-MEDIAN BRANCH-LUMBAR/COOLED Left 4/26/2017    Performed by [facility] Wagner MD at [facility]    RELEASE, TRIGGER FINGER Right 2/4/2013    Performed by Velma Garcia [facility] at [facility] OR 1ST FLR    REPLACEMENT-KNEE-TOTAL Left 6/19/2014    Performed by Theodore Swan MD at [facility] OR 2ND FLR    Revision Carpal Tunnel-Right Right 12/14/2015    [facility] OR    REVISION-ARTHROPLASTY-KNEE-TOTAL Right 11/4/2014    Performed by Theodore Swan MD at [facility] OR 2ND FLR     Family History   Problem Relation Age of Onset    Diabetes Mother     Hypertension Mother     Cataracts Mother      Diabetes Father     Cataracts Father     Coronary artery disease Brother     Amblyopia Neg Hx     Blindness Neg Hx     Cancer Neg Hx     Glaucoma Neg Hx     Macular degeneration Neg Hx     Retinal detachment Neg Hx     Strabismus Neg Hx     Stroke Neg Hx     Thyroid disease Neg Hx      Social History     Tobacco Use    Smoking status: Never Smoker    Smokeless tobacco: Never Used   Substance Use Topics    Alcohol use: Yes     Comment: occasionally     Drug use: No     Review of Systems   Constitutional: Negative for activity change, chills, diaphoresis and fever.   HENT: Negative for sore throat.    Eyes: Negative for photophobia and itching.   Respiratory: Positive for shortness of breath. Negative for apnea, cough, chest tightness, wheezing and stridor.    Cardiovascular: Negative for chest pain, palpitations and leg swelling.   Gastrointestinal: Negative for abdominal distention, abdominal pain, anal bleeding, blood in stool, constipation, diarrhea and nausea.   Endocrine: Negative for polydipsia, polyphagia and polyuria.   Genitourinary: Negative for dysuria, genital sores, hematuria, menstrual problem and pelvic pain.   Musculoskeletal: Positive for gait problem and joint swelling. Negative for arthralgias, back pain, myalgias, neck pain and neck stiffness.   Skin: Negative for rash.   Allergic/Immunologic: Negative for immunocompromised state.   Neurological: Negative for dizziness, seizures, facial asymmetry, weakness, light-headedness, numbness and headaches.   Hematological: Does not bruise/bleed easily.   Psychiatric/Behavioral: Negative for agitation and behavioral problems.       Physical Exam     Initial Vitals [12/22/18 2019]   BP Pulse Resp Temp SpO2   (!) 117/54 86 20 97.8 °F (36.6 °C) 97 %      MAP       --         Physical Exam    Nursing note and vitals reviewed.  Constitutional: She appears well-developed and well-nourished.   HENT:   Head: Normocephalic and atraumatic.    Right Ear: External ear normal.   Left Ear: External ear normal.   Nose: Nose normal.   Mouth/Throat: Oropharynx is clear and moist.   Eyes: Conjunctivae and EOM are normal. Pupils are equal, round, and reactive to light.   Neck: Normal range of motion. Neck supple.   Cardiovascular: Normal rate, regular rhythm, normal heart sounds and intact distal pulses.   Pulmonary/Chest: Breath sounds normal.   Abdominal: Soft. Bowel sounds are normal.   Musculoskeletal: She exhibits tenderness.        Left knee: She exhibits decreased range of motion and swelling. She exhibits no effusion, no ecchymosis, no deformity, no laceration, no erythema, normal alignment, no LCL laxity, normal patellar mobility, no bony tenderness, normal meniscus and no MCL laxity. Tenderness found. Medial joint line tenderness noted. No lateral joint line, no MCL, no LCL and no patellar tendon tenderness noted.   Neurological: She is alert and oriented to person, place, and time. She has normal strength and normal reflexes. GCS score is 15. GCS eye subscore is 4. GCS verbal subscore is 5. GCS motor subscore is 6.   Skin: Skin is warm and dry. Capillary refill takes less than 2 seconds.   Psychiatric: She has a normal mood and affect. Thought content normal.         ED Course   Procedures  Labs Reviewed - No data to display  EKG Readings: (Independently Interpreted)   Normal sinus rhythm.  Ventricular rate 82 beats per minute. Normal axis.  Normal QRS and QT intervals.  No ST segment elevation or depression.  Normal T-wave morphology.  Overall impression:  Normal study.       Imaging Results    None       Imaging Results          X-Ray Knee 3 View Left (Final result)  Result time 12/22/18 21:48:42    Final result by Maximo Stephens MD (12/22/18 21:48:42)                 Impression:      Left knee total arthroplasty and suspected nonspecific suprapatellar joint effusion, without acute displaced fracture-dislocation definitively  seen.      Electronically signed by: Maximo Stephens MD  Date:    12/22/2018  Time:    21:48             Narrative:    EXAMINATION:  XR KNEE 3 VIEW LEFT    CLINICAL HISTORY:  Pain, unspecified    TECHNIQUE:  AP, lateral, and Merchant views of the left knee were performed.    COMPARISON:  Bilateral knee series 12/27/2017    FINDINGS:  Left knee total arthroplasty appears stable without evidence of failure or loosening.  Patella is unchanged.  No acute displaced fracture, dislocation or destructive osseous process.  There is suspected nonspecific suprapatellar joint effusion.  No subcutaneous emphysema or radiodense retained foreign body.                               X-Ray Chest PA And Lateral (Final result)  Result time 12/22/18 21:53:11    Final result by Maximo Stephens MD (12/22/18 21:53:11)                 Impression:      Left basilar platelike scarring versus atelectasis without detrimental change or radiographic acute intrathoracic process seen.      Electronically signed by: Maximo Stephens MD  Date:    12/22/2018  Time:    21:53             Narrative:    EXAMINATION:  XR CHEST PA AND LATERAL    CLINICAL HISTORY:  Shortness of breath    TECHNIQUE:  PA and lateral views of the chest were performed.    COMPARISON:  Chest radiograph 10/08/2017    FINDINGS:  Resolution is limited by body habitus with underpenetration.  Left basilar bandlike opacities consistent with platelike scarring versus atelectasis.  The lungs are otherwise well expanded without focal consolidation, pleural effusion or pneumothorax.  Pulmonary vasculature and hilar regions are within normal limits.  Cardiomediastinal silhouette appears stable without evidence of failure.  Osseous structures appear stable without acute process seen.                                 Medical Decision Making:   History:   Old Records Summarized: records from clinic visits.  Initial Assessment:   Pt. Seen and examined at the bedside. She is sitting on the exam table in  no acute distress and cooperative with exam.   Differential Diagnosis:   Knee fracture vs dislocation vs chronic DJD   Clinical Tests:   Lab Tests: Ordered and Reviewed  Radiological Study: Ordered and Reviewed  Medical Tests: Ordered and Reviewed  ED Management:  X ray of the left knee demonstrates advanced DJD. Pt. Has ricardo;ed follow up with her PCP to discuss orthopedic consultation.   Other:   I have discussed this case with another health care provider.              Attending Attestation:     Physician Attestation Statement for NP/PA:   I have conducted a face to face encounter with this patient in addition to the NP/PA, due to Medical Complexity    Other NP/PA Attestation Additions:    History of Present Illness: Patient with history of left knee arthroplasty presented with 2 weeks of worsening left knee pain.  Denies acute trauma. Also complaining of shortness of breath which she attributed to the severe pain in her knee.  Denied calf pain and swelling.  Denied claudication.  Denied cough.  Denied chest pain and palpitations.   Physical Exam: Afebrile.  Stable vitals.  No respiratory distress. Clear breath sounds. Chronic degenerative changes to the left knee.  No tenderness.  Decreased active and passive range of motion secondary to pain. Left knee joint is stable. No calf swelling or tenderness bilaterally. Negative bilateral Homans sign. Normal EKG.  Chest radiograph negative for acute processes.  No leukocytosis, severe anemia, electrolyte anomalies, renal insufficiency, elevation of troponin or BNP on lab review.  Small joint effusion on imaging of knee.   Medical Decision Making: Primary complaint of left knee pain.  History of left knee arthroplasty.  Exam findings consistent with acute inflammatory process.  Presentation and exam are not consistent with ACS, aortic dissection, CHF.                    Clinical Impression:   The primary encounter diagnosis was Arthritis of left knee. Diagnoses of SOB  (shortness of breath) and Pain were also pertinent to this visit.      Disposition:   Disposition: Discharged  Condition: Stable                        Stanley Frey PA-C  12/22/18 [id number]       Varun Chambers III, MD  12/25/18 [id number]

## 2018-12-24 DIAGNOSIS — R80.9 PROTEINURIA, UNSPECIFIED TYPE: ICD-10-CM

## 2018-12-24 DIAGNOSIS — K21.9 GASTROESOPHAGEAL REFLUX DISEASE WITHOUT ESOPHAGITIS: ICD-10-CM

## 2018-12-24 DIAGNOSIS — E78.5 HYPERLIPIDEMIA, UNSPECIFIED HYPERLIPIDEMIA TYPE: ICD-10-CM

## 2018-12-26 RX ORDER — LISINOPRIL 2.5 MG/1
TABLET ORAL
Qty: 30 TABLET | Refills: 10 | Status: SHIPPED | OUTPATIENT
Start: 2018-12-26 | End: 2019-05-06

## 2018-12-26 RX ORDER — OMEPRAZOLE 20 MG/1
CAPSULE, DELAYED RELEASE ORAL
Qty: 30 CAPSULE | Refills: 10 | Status: SHIPPED | OUTPATIENT
Start: 2018-12-26 | End: 2019-05-06 | Stop reason: SDUPTHER

## 2018-12-26 RX ORDER — METFORMIN HYDROCHLORIDE 500 MG/1
TABLET, EXTENDED RELEASE ORAL
Qty: 360 TABLET | Refills: 10 | Status: SHIPPED | OUTPATIENT
Start: 2018-12-26 | End: 2019-05-06 | Stop reason: SDUPTHER

## 2018-12-26 RX ORDER — ATORVASTATIN CALCIUM 20 MG/1
TABLET, FILM COATED ORAL
Qty: 30 TABLET | Refills: 10 | Status: SHIPPED | OUTPATIENT
Start: 2018-12-26 | End: 2019-05-06 | Stop reason: SDUPTHER

## 2018-12-27 ENCOUNTER — HOSPITAL ENCOUNTER (OUTPATIENT)
Facility: OTHER | Age: 53
Discharge: HOME | End: 2018-12-27
Attending: ANESTHESIOLOGY | Admitting: ANESTHESIOLOGY
Payer: MEDICARE

## 2018-12-27 VITALS
DIASTOLIC BLOOD PRESSURE: 77 MMHG | TEMPERATURE: 98 F | WEIGHT: 293 LBS | OXYGEN SATURATION: 98 % | HEIGHT: 65 IN | BODY MASS INDEX: 48.82 KG/M2 | RESPIRATION RATE: 18 BRPM | HEART RATE: 64 BPM | SYSTOLIC BLOOD PRESSURE: 132 MMHG

## 2018-12-27 DIAGNOSIS — M47.819 OSTEOARTHRITIS OF SPINE WITHOUT MYELOPATHY OR RADICULOPATHY, UNSPECIFIED SPINAL REGION: Primary | ICD-10-CM

## 2018-12-27 DIAGNOSIS — M47.816 LUMBAR SPONDYLOSIS: ICD-10-CM

## 2018-12-27 DIAGNOSIS — G89.29 CHRONIC PAIN: ICD-10-CM

## 2018-12-27 LAB — POCT GLUCOSE: 161 MG/DL (ref 70–110)

## 2018-12-27 PROCEDURE — 64635 DESTROY LUMB/SAC FACET JNT: CPT | Mod: LT,,, | Performed by: ANESTHESIOLOGY

## 2018-12-27 PROCEDURE — 64636 DESTROY L/S FACET JNT ADDL: CPT | Performed by: ANESTHESIOLOGY

## 2018-12-27 PROCEDURE — 64636 DESTROY L/S FACET JNT ADDL: CPT | Mod: LT,,, | Performed by: ANESTHESIOLOGY

## 2018-12-27 PROCEDURE — 99152 MOD SED SAME PHYS/QHP 5/>YRS: CPT | Mod: ,,, | Performed by: ANESTHESIOLOGY

## 2018-12-27 PROCEDURE — 25000003 PHARM REV CODE 250: Performed by: ANESTHESIOLOGY

## 2018-12-27 PROCEDURE — 82947 ASSAY GLUCOSE BLOOD QUANT: CPT | Performed by: ANESTHESIOLOGY

## 2018-12-27 PROCEDURE — 64635 DESTROY LUMB/SAC FACET JNT: CPT | Performed by: ANESTHESIOLOGY

## 2018-12-27 PROCEDURE — 63600175 PHARM REV CODE 636 W HCPCS: Performed by: ANESTHESIOLOGY

## 2018-12-27 PROCEDURE — 25000003 PHARM REV CODE 250: Performed by: STUDENT IN AN ORGANIZED HEALTH CARE EDUCATION/TRAINING PROGRAM

## 2018-12-27 RX ORDER — SODIUM CHLORIDE 9 MG/ML
500 INJECTION, SOLUTION INTRAVENOUS CONTINUOUS
Status: DISCONTINUED | OUTPATIENT
Start: 2018-12-27 | End: 2018-12-27 | Stop reason: HOSPADM

## 2018-12-27 RX ORDER — FENTANYL CITRATE 50 UG/ML
INJECTION, SOLUTION INTRAMUSCULAR; INTRAVENOUS
Status: DISCONTINUED | OUTPATIENT
Start: 2018-12-27 | End: 2018-12-27 | Stop reason: HOSPADM

## 2018-12-27 RX ORDER — BUPIVACAINE HYDROCHLORIDE 2.5 MG/ML
INJECTION, SOLUTION EPIDURAL; INFILTRATION; INTRACAUDAL
Status: DISCONTINUED | OUTPATIENT
Start: 2018-12-27 | End: 2018-12-27 | Stop reason: HOSPADM

## 2018-12-27 RX ORDER — MIDAZOLAM HYDROCHLORIDE 1 MG/ML
INJECTION INTRAMUSCULAR; INTRAVENOUS
Status: DISCONTINUED | OUTPATIENT
Start: 2018-12-27 | End: 2018-12-27 | Stop reason: HOSPADM

## 2018-12-27 RX ORDER — LIDOCAINE HYDROCHLORIDE 10 MG/ML
INJECTION INFILTRATION; PERINEURAL
Status: DISCONTINUED | OUTPATIENT
Start: 2018-12-27 | End: 2018-12-27 | Stop reason: HOSPADM

## 2018-12-27 NOTE — DISCHARGE SUMMARY
Discharge Note  Short Stay      SUMMARY     Admit Date: 12/27/2018    Attending Physician: Meg Mayo      Discharge Physician: Meg Mayo      Discharge Date: 12/27/2018 9:06 AM    Procedure(s) (LRB):  Radiofrequency Ablation LEFT L2,3,4,5 RFA (Left)    Final Diagnosis: Lumbar spondylosis [M47.816]    Disposition: Home or self care    Patient Instructions:   Current Discharge Medication List      CONTINUE these medications which have NOT CHANGED    Details   aspirin (ECOTRIN) 81 MG EC tablet Take 1 tablet by mouth every morning. prevents heart attacks and strokes      atorvastatin (LIPITOR) 20 MG tablet TAKE 1 TABLET BY MOUTH DAILY  Qty: 30 tablet, Refills: 10    Associated Diagnoses: Hyperlipidemia, unspecified hyperlipidemia type      !! blood sugar diagnostic Strp Freestyle light strips and lancets  Qty: 100 each, Refills: 6    Associated Diagnoses: Uncontrolled type 2 diabetes mellitus without complication, with long-term current use of insulin      !! blood sugar diagnostic Strp Check glucose four times daily Strips and lancets covered by insurance E11.9 - One touch  Qty: 120 each, Refills: 6      blood-glucose meter kit Check glucose four times daily E11.9 meter covered by insurance One Touch  Qty: 1 each, Refills: 0      etodolac (LODINE) 500 MG tablet Take 1 tablet (500 mg total) by mouth 2 (two) times daily. for 10 days  Qty: 20 tablet, Refills: 0      fluconazole (DIFLUCAN) 150 MG Tab TAKE ONE TABLET BY MOUTH ONCE DAILY  Qty: 3 tablet, Refills: 1    Comments: Please consider 90 day supplies to promote better adherence      FLUoxetine (PROZAC) 20 MG capsule Take 1 capsule (20 mg total) by mouth once daily.  Qty: 30 capsule, Refills: 6      fluticasone (FLONASE) 50 mcg/actuation nasal spray 1 spray by Each Nare route 2 (two) times daily as needed for Rhinitis.  Qty: 15 g, Refills: 0      insulin detemir U-100 (LEVEMIR FLEXPEN) 100 unit/mL (3 mL) SubQ InPn pen Inject 30 Units into the skin  "every evening. Inject 40 units every evening  Qty: 1 Box, Refills: 6    Associated Diagnoses: Uncontrolled type 2 diabetes mellitus without complication, with long-term current use of insulin      insulin lispro (HUMALOG KWIKPEN INSULIN) 100 unit/mL pen INJECT 11 UNITS SUBCUTANEOUSLY BEFORE MEAL(S) PLUS  SLIDING  SCALE  Qty: 1 Box, Refills: 6      lisinopril (PRINIVIL,ZESTRIL) 2.5 MG tablet TAKE 1 TABLET BY MOUTH DAILY FOR PROTEINURIA  Qty: 30 tablet, Refills: 10    Associated Diagnoses: Proteinuria, unspecified type      metFORMIN (GLUCOPHAGE-XR) 500 MG 24 hr tablet TAKE 2 TABLETS BY MOUTH TWICE DAILY  Qty: 360 tablet, Refills: 10    Associated Diagnoses: Uncontrolled type 2 diabetes mellitus without complication, with long-term current use of insulin      omeprazole (PRILOSEC) 20 MG capsule TAKE 1 CAPSULE BY MOUTH EVERY MORNING.  Qty: 30 capsule, Refills: 10    Associated Diagnoses: Gastroesophageal reflux disease without esophagitis      oxyCODONE-acetaminophen (PERCOCET)  mg per tablet Take 1 tablet by mouth every 8 (eight) hours as needed for Pain.  Qty: 90 tablet, Refills: 0    Associated Diagnoses: Spondylosis of lumbar region without myelopathy or radiculopathy; Facet arthritis of lumbar region; DDD (degenerative disc disease), lumbar; Primary osteoarthritis of both knees; Bilateral chronic knee pain; Status post total right knee replacement; Chronic pain disorder; Encounter for long-term opiate analgesic use      pen needle, diabetic 33 gauge x 5/32" Ndle 1 application by Misc.(Non-Drug; Combo Route) route 4 (four) times daily with meals and nightly.  Qty: 100 each, Refills: 6      VENTOLIN HFA 90 mcg/actuation inhaler USE 2 PUFFS EVERY 4 TO 6 HOURS AS NEEDED FOR SHORTNESS OF BREATH OR WHEEZE  Qty: 18 g, Refills: 10    Associated Diagnoses: Asthma, well controlled, mild intermittent      vitamin D 185 MG Tab Take 5,000 mg by mouth once daily.       !! - Potential duplicate medications found. Please " discuss with provider.              Discharge Diagnosis: Lumbar spondylosis [M47.816]  Condition on Discharge: Stable with no complications to procedure   Diet on Discharge: Same as before.  Activity: as per instruction sheet.  Discharge to: Home with a responsible adult.  Follow up: 2-4 weeks

## 2018-12-27 NOTE — OP NOTE
Lumbar Medial nerve branch block radiofrequency ablation Under Fluoroscopy     Time-out taken to identify patient and procedure side prior to starting the procedure.     12/27/2018    PROCEDURE: Left radiofrequency ablation of the the medial branch nerves at the   transverse process of  L3, L4, L5 and sacral ala    2)Conscious sedation provided by MD     REASON FOR PROCEDURE: Lumbar spondylosis [M47.816]     PHYSICIAN: Lina Wagner MD     ASSISTANTS: Meg Mayo DO      MEDICATIONS INJECTED: 0.25% Bupivicaine total 8mL     LOCAL ANESTHETIC USED: Xylocaine 1% 1mL / site     ESTIMATED BLOOD LOSS: None.     COMPLICATIONS: None.     Interval history: Patient reports that he had complete relief of pain for the day of the procedure, we will proceed with the RFA     TECHNIQUE: Laying in a prone position, the patient was prepped and draped in the usual sterile fashion using ChloraPrep and fenestrated drape. The level was determined under fluoroscopic guidance. Local anesthetic was given by going down to the hub of the 27-gauge 1.25in needle and raising a wheel. A 18-gauge 10mm curved active tip needle was introduced to the anatomic local of the medial branch at each of the above levels using fluoroscopy. Then sensory and motor testing was performed to confirm that the needle tips were in the correct location. Then after negative aspiration, 1 mL of 0.25% bupivacaine was injected into each level. This was followed by thermal lesioning at 80 degrees celsius for 90 seconds.     Conscious sedation provided by M.D   The patient was monitored with continuous pulse oximetry, EKG, and intermittent blood pressure monitors. The patient was hemodynamically stable throughout the entire process was responsive to voice, and breathing spontaneously. Supplemental O2 was provided at 2L/min via nasal cannula. Patient was comfortable for the duration of the procedure. (See nurse documentation and case log for sedation time)    There  was a total of 2mg IV Midazolam and 75mcg Fentanyl titrated for the procedure    The patient tolerated the procedure well. Was able to move their leg at the knee and ankle at the conclusion of the procedure    The patient was monitored after the procedure. Patient was given post procedure and discharge instructions to follow at home. We will see the patient back in two weeks or the patient may call to inform of status. The patient was discharged in a stable condition

## 2018-12-27 NOTE — DISCHARGE INSTRUCTIONS
Thank you for allowing us to care for you today. You may receive a survey about the care we provided. Your feedback is valuable and helps us provide excellent care throughout the community. Adult Procedural Sedation Instructions    Recovery After Procedural Sedation (Adult)  You have been given medicine by vein to make you sleep during your surgery. This may have included both a pain medicine and sleeping medicine. Most of the effects have worn off. But you may still have some drowsiness for the next 6 to 8 hours.  Home care  Follow these guidelines when you get home:  · For the next 8 hours, you should be watched by a responsible adult. This person should make sure your condition is not getting worse.  · Don't drink any alcohol for the next 24 hours.  · Don't drive, operate dangerous machinery, or make important business or personal decisions during the next 24 hours.  Note: Your healthcare provider may tell you not to take any medicine by mouth for pain or sleep in the next 4 hours. These medicines may react with the medicines you were given in the hospital. This could cause a much stronger response than usual.  Follow-up care  Follow up with your healthcare provider if you are not alert and back to your usual level of activity within 12 hours.  When to seek medical advice  Call your healthcare provider right away if any of these occur:  · Drowsiness gets worse  · Weakness or dizziness gets worse  · Repeated vomiting  · You can't be awakened   Date Last Reviewed: 10/18/2016  © 5872-7505 [employer]. [address] [place] [address]. All rights reserved. This information is not intended as a substitute for professional medical care. Always follow your healthcare professional's instructions.     Home Care Instructions Pain Management:    1. DIET:   You may resume your normal diet today.   2. BATHING:   You may shower with luke warm water.  3. DRESSING:   You may remove your bandage today.    4. ACTIVITY LEVEL:   You may resume your normal activities 24 hrs after your procedure.  5. MEDICATIONS:   You may resume your normal medications today.   6. SPECIAL INSTRUCTIONS:   No heat to the injection site for 24 hrs including, bath or shower, heating pad, moist heat, or hot tubs.    Use ice pack to injection site for any pain or discomfort.  Apply ice packs for 20 minute intervals as needed.   If you have received any sedatives by mouth today you may not drive for 12 hours.    If you have received any sedation through your IV, you may not drive for 24 hrs.     PLEASE CALL YOUR DOCTOR IF:  1. Redness or swelling around the injection site.  2. Fever of 101 degrees  3. Drainage (pus) from the injection site.  4. For any continuous bleeding (some dried blood over the incision is normal.)    FOR EMERGENCIES:   If any unusual problems or difficulties occur during clinic hours, call [phone] or [phone].   Adult Procedural Sedation Instructions

## 2018-12-27 NOTE — H&P
HPI  Alivia Thomas is a 53 y.o. female.    Presenting for:  Procedure(s) (LRB):  Radiofrequency Ablation LEFT L2,3,4,5 RFA (Left)    Last seen in clinic with Virginia Sanchez on 11/26/2018. Patient went to ED on 12/22/2018 for SOB and knee pain (she states she had it for 2 weeks prior but acutely worsened on that day) described as shooting to the knee from her anterior thigh which has improved but is persistent.  EKG and CXR were normal and knee xray shows small nonspecific suprapatellar joint effusion.  Patient received refill of percocet 10 q8H PRN from Dr. Wagner on 12/24.    No antithrombotic medications. Denies any recent fevers, infections, antibiotics, changes in health.     Anesthesia/Surgery risks, benefits and alternative options discussed and understood by patient/family.    No health changes since previous encounter    PMHx, PSHx, Allergies, Medications reviewed in epic    ROS negative except pain complaints in HPI    OBJECTIVE:    There were no vitals taken for this visit.    PHYSICAL EXAMINATION:    GENERAL: Well appearing, in no acute distress, alert and oriented x3.  PSYCH:  Mood and affect appropriate.  SKIN: Skin color, texture, turgor normal, no rashes or lesions.  CV: RRR with palpation of the radial artery.  PULM: No evidence of respiratory difficulty, symmetric chest rise. Clear to auscultation.  NEURO: Cranial nerves grossly intact.    Plan:    Proceed with procedure as planned    Marcell Crawford  12/27/2018

## 2018-12-27 NOTE — PLAN OF CARE
Pt tolerated procedure well. Pt pain 8/10. Pt assisted to feet for the first time, steady on feet. Discharge instructions given. Pt verbalized understanding.

## 2018-12-31 DIAGNOSIS — J45.20 ASTHMA, WELL CONTROLLED, MILD INTERMITTENT: ICD-10-CM

## 2018-12-31 RX ORDER — ALBUTEROL SULFATE 90 UG/1
AEROSOL, METERED RESPIRATORY (INHALATION)
Qty: 18 EACH | Refills: 0 | Status: SHIPPED | OUTPATIENT
Start: 2018-12-31 | End: 2020-01-13 | Stop reason: SDUPTHER

## 2019-01-17 ENCOUNTER — OFFICE VISIT (OUTPATIENT)
Dept: PODIATRY | Facility: CLINIC | Age: 54
End: 2019-01-17
Payer: MEDICARE

## 2019-01-17 VITALS — BODY MASS INDEX: 48.82 KG/M2 | HEIGHT: 65 IN | WEIGHT: 293 LBS

## 2019-01-17 DIAGNOSIS — B35.1 DERMATOPHYTOSIS OF NAIL: ICD-10-CM

## 2019-01-17 DIAGNOSIS — E11.49 TYPE II DIABETES MELLITUS WITH NEUROLOGICAL MANIFESTATIONS: Primary | ICD-10-CM

## 2019-01-17 PROCEDURE — 11721 DEBRIDE NAIL 6 OR MORE: CPT | Mod: Q9,PBBFAC,PN | Performed by: PODIATRIST

## 2019-01-17 PROCEDURE — 99999 PR PBB SHADOW E&M-EST. PATIENT-LVL IV: ICD-10-PCS | Mod: PBBFAC,,, | Performed by: PODIATRIST

## 2019-01-17 PROCEDURE — 99214 OFFICE O/P EST MOD 30 MIN: CPT | Mod: PBBFAC,PN,25 | Performed by: PODIATRIST

## 2019-01-17 PROCEDURE — 99499 UNLISTED E&M SERVICE: CPT | Mod: S$PBB,,, | Performed by: PODIATRIST

## 2019-01-17 PROCEDURE — 11721 ROUTINE FOOT CARE: ICD-10-PCS | Mod: Q9,S$PBB,, | Performed by: PODIATRIST

## 2019-01-17 PROCEDURE — 99499 NO LOS: ICD-10-PCS | Mod: S$PBB,,, | Performed by: PODIATRIST

## 2019-01-17 PROCEDURE — 99999 PR PBB SHADOW E&M-EST. PATIENT-LVL IV: CPT | Mod: PBBFAC,,, | Performed by: PODIATRIST

## 2019-01-17 NOTE — PATIENT INSTRUCTIONS
How to Check Your Feet    Below are tips to help you look for foot problems. Try to check your feet at the same time each day, such as when you get out of bed in the morning.    · Check the top of each foot. The tops of toes, back of the heel, and outer edge of the foot can get a lot of rubbing from poor-fitting shoes.    · Check the bottom of each foot. Daily wear and tear often leads to problems at pressure spots.    · Check the toes and nails. Fungal infections often occur between toes. Toenail problems can also be a sign of fungal infections or lead to breaks in the skin.    · Check your shoes, too. Loose objects inside a shoe can injure the foot. Use your hand to feel inside your shoes for things like alie, loose stitching, or rough areas that could irritate your skin.        Diabetic Foot Care    Diabetes can lead to a number of different foot complications. Fortunately, most of these complications can be prevented with a little extra foot care. If diabetes is not well controlled, the high blood sugar can cause damage to blood vessels and result in poor circulation to the foot. When the skin does not get enough blood flow, it becomes prone to pressure sores and ulcers, which heal slowly.  High blood sugar can also damage nerves, interfering with the ability to feel pain and pressure. When you cant feel your foot normally, it is easy to injure your skin, bones and joints without knowing it. For these reasons diabetes increases the risk of fungal infections, bunions and ulcers. Deep ulcers can lead to bone infection. Gangrene is the most serious foot complication of diabetes. It usually occurs on the tips of the toes as blacked areas of skin. The black area is dead tissue. In severe cases, gangrene spreads to involve the entire toe, other toes and the entire foot. Foot or toe amputation may be required. Good foot care and blood sugar control can prevent this.    Home Care  1. Wear comfortable, proper fitting  shoes.  2. Wash your feet daily with warm water and mild soap.  3. After drying, apply a moisturizing cream or lotion.  4. Check your feet daily for skin breaks, blisters, swelling, or redness. Look between your toes also.  5. Wear cotton socks and change them every day.  6. Trim toe nails carefully and do not cut your cuticles.  7. Strive to keep your blood sugar under control with a combination of medicines, diet and activity.  8. If you smoke and have diabetes, it is very important that you stop. Smoking reduces blood flow to your foot.  9. Avoid activities that increase your risk of foot injury:  · Do not walk barefoot.  · Do not use heating pads or hot water bottles on your feet.  · Do not put your foot in a hot tub without first checking the temperature with your hand.  10) Schedule yearly foot exams.    Follow Up  with your doctor or as advised by our staff. Report any cut, puncture, scrape, other injury, blister, ingrown toenail or ulcer on your foot.    Get Prompt Medical Attention  if any of the following occur:  -- Open ulcer with pus draining from the wound  -- Increasing foot or leg pain  -- New areas of redness or swelling or tender areas of the foot    © 8124-9824 [employer]. [address] [place] [address], [place] [address]. All rights reserved. This information is not intended as a substitute for professional medical care. Always follow your healthcare professional's instructions.

## 2019-01-17 NOTE — PROGRESS NOTES
"HPI:   The patient is a 53 y.o.  female  who presents for a diabetic foot exam.   Patient reports + presence of abnormal sensation to the feet, just sometimes, mostly at night.   Patient has no history of wounds on the feet.   Hx of foot surgery: none.   Shoe gear: casual shoes   This patient has documented high risk feet requiring routine maintenance secondary to diabetes.    Main concern today is need for toenail trimming. Routine trimming helps.        Primary care doctor is: Clarisse Richardson MD  Patient last saw primary care doctor on:    Chief Complaint   Patient presents with    Diabetes Mellitus     PCP: Clarisse Richardson 08/21/2018  A1C: 08/20/2018 / 10.7           Vitals:    01/17/19 0932   Weight: (!) 161 kg (355 lb)   Height: 5' 5" (1.651 m)   PainSc:   2             Past Medical History:   Diagnosis Date    Asthma     Diabetes mellitus type II     DJD (degenerative joint disease) of knee 6/19/2014    Heartburn     Hyperlipidemia     Morbid obesity     Sleep apnea          Current Outpatient Medications on File Prior to Visit   Medication Sig Dispense Refill    aspirin (ECOTRIN) 81 MG EC tablet Take 1 tablet by mouth every morning. prevents heart attacks and strokes      atorvastatin (LIPITOR) 20 MG tablet TAKE 1 TABLET BY MOUTH DAILY 30 tablet 10    blood sugar diagnostic Strp Freestyle light strips and lancets 100 each 6    blood sugar diagnostic Strp Check glucose four times daily Strips and lancets covered by insurance E11.9 - One touch 120 each 6    blood-glucose meter kit Check glucose four times daily E11.9 meter covered by insurance One Touch 1 each 0    fluconazole (DIFLUCAN) 150 MG Tab TAKE ONE TABLET BY MOUTH ONCE DAILY 3 tablet 1    FLUoxetine (PROZAC) 20 MG capsule Take 1 capsule (20 mg total) by mouth once daily. 30 capsule 6    fluticasone (FLONASE) 50 mcg/actuation nasal spray 1 spray by Each Nare route 2 (two) times daily as needed for Rhinitis. 15 g 0    insulin " "detemir U-100 (LEVEMIR FLEXPEN) 100 unit/mL (3 mL) SubQ InPn pen Inject 30 Units into the skin every evening. Inject 40 units every evening 1 Box 6    insulin lispro (HUMALOG KWIKPEN INSULIN) 100 unit/mL pen INJECT 11 UNITS SUBCUTANEOUSLY BEFORE MEAL(S) PLUS  SLIDING  SCALE 1 Box 6    lisinopril (PRINIVIL,ZESTRIL) 2.5 MG tablet TAKE 1 TABLET BY MOUTH DAILY FOR PROTEINURIA 30 tablet 10    metFORMIN (GLUCOPHAGE-XR) 500 MG 24 hr tablet TAKE 2 TABLETS BY MOUTH TWICE DAILY 360 tablet 10    omeprazole (PRILOSEC) 20 MG capsule TAKE 1 CAPSULE BY MOUTH EVERY MORNING. 30 capsule 10    oxyCODONE-acetaminophen (PERCOCET)  mg per tablet Take 1 tablet by mouth every 8 (eight) hours as needed for Pain. 90 tablet 0    pen needle, diabetic 33 gauge x 5/32" Ndle 1 application by Misc.(Non-Drug; Combo Route) route 4 (four) times daily with meals and nightly. 100 each 6    VENTOLIN HFA 90 mcg/actuation inhaler INHALE TWO PUFFS INTO LUNGS EVERY 4 TO 6 HOURS AS NEEDED FOR SHORTNESS OF BREATH AND FOR WHEEZING 18 each 0    vitamin D 185 MG Tab Take 5,000 mg by mouth once daily.       No current facility-administered medications on file prior to visit.        Review of patient's allergies indicates:  No Known Allergies          Social History     Socioeconomic History    Marital status: Single     Spouse name: Not on file    Number of children: Not on file    Years of education: Not on file    Highest education level: Not on file   Social Needs    Financial resource strain: Not on file    Food insecurity - worry: Not on file    Food insecurity - inability: Not on file    Transportation needs - medical: Not on file    Transportation needs - non-medical: Not on file   Occupational History    Not on file   Tobacco Use    Smoking status: Never Smoker    Smokeless tobacco: Never Used   Substance and Sexual Activity    Alcohol use: Yes     Comment: occasionally     Drug use: No    Sexual activity: Yes     Partners: " Female     Birth control/protection: None   Other Topics Concern    Not on file   Social History Narrative    Disabled. The patient is the youngest of 6 siblings. Single. Lives with single-sex partner.                        ROS:  General ROS: negative  Respiratory ROS: no cough, shortness of breath, or wheezing  Cardiovascular ROS: no chest pain or dyspnea on exertion  Musculoskeletal ROS: negative  Neurological ROS:   negative for - gait disturbance, + numbness/tingling, seizures or tremors  Dermatological ROS: + nail changes, dry skin          LAST HbA1c:   Hemoglobin A1C   Date Value Ref Range Status   08/20/2018 10.7 (H) 4.0 - 5.6 % Final     Comment:     ADA Screening Guidelines:  5.7-6.4%  Consistent with prediabetes  >or=6.5%  Consistent with diabetes  High levels of fetal hemoglobin interfere with the HbA1C  assay. Heterozygous hemoglobin variants (HbS, HgC, etc)do  not significantly interfere with this assay.   However, presence of multiple variants may affect accuracy.     05/17/2018 7.6 (H) 4.0 - 5.6 % Final     Comment:     According to ADA guidelines, hemoglobin A1c <7.0% represents  optimal control in non-pregnant diabetic patients. Different  metrics may apply to specific patient populations.   Standards of Medical Care in Diabetes-2016.  For the purpose of screening for the presence of diabetes:  <5.7%     Consistent with the absence of diabetes  5.7-6.4%  Consistent with increasing risk for diabetes   (prediabetes)  >or=6.5%  Consistent with diabetes  Currently, no consensus exists for use of hemoglobin A1c  for diagnosis of diabetes for children.  This Hemoglobin A1c assay has significant interference with fetal   hemoglobin   (HbF). The results are invalid for patients with abnormal amounts of   HbF,   including those with known Hereditary Persistence   of Fetal Hemoglobin. Heterozygous hemoglobin variants (HbAS, HbAC,   HbAD, HbAE, HbA2) do not significantly interfere with this assay;  "  however, presence of multiple variants in a sample may impact the %   interference.     03/19/2018 7.6 (H) 4.0 - 5.6 % Final     Comment:     According to ADA guidelines, hemoglobin A1c <7.0% represents  optimal control in non-pregnant diabetic patients. Different  metrics may apply to specific patient populations.   Standards of Medical Care in Diabetes-2016.  For the purpose of screening for the presence of diabetes:  <5.7%     Consistent with the absence of diabetes  5.7-6.4%  Consistent with increasing risk for diabetes   (prediabetes)  >or=6.5%  Consistent with diabetes  Currently, no consensus exists for use of hemoglobin A1c  for diagnosis of diabetes for children.  This Hemoglobin A1c assay has significant interference with fetal   hemoglobin   (HbF). The results are invalid for patients with abnormal amounts of   HbF,   including those with known Hereditary Persistence   of Fetal Hemoglobin. Heterozygous hemoglobin variants (HbAS, HbAC,   HbAD, HbAE, HbA2) do not significantly interfere with this assay;   however, presence of multiple variants in a sample may impact the %   interference.           EXAM:   Vitals:    01/17/19 0932   Weight: (!) 161 kg (355 lb)   Height: 5' 5" (1.651 m)       General: Pt. is well-developed, well-nourished, appears stated age, in no acute distress, alert and oriented x 3.      Vascular: Dorsalis pedis and posterior tibial pulses are 2/4 bilaterally. 3 secs capillary refill time and toes are warm to touch.  There is reduced hair growth on the feet.  There is 1+ edema.  no varicosities.      Neurological:  Light touch, proprioception, and sharp/dull sensation are all intact bilaterally. Protective threshold with the Merom-Lindsay monofilament is diminished bilaterally.     Dermatological:  The skin is intact, warm.  The toenails  1-5 L and 1-5 R  are greenish- yellow, long by 5-6mm and thick by 2-3mm with subungual debris  .   There are no open wounds. There is no " ecchymoses.  no erythema noted.   Skin diffusely dry on the soles     Interdigital spaces are intact, no fissures    Skin atrophic, thin, dry and mildly hyperpigmented.     Musculoskeletal:  Muscle strength is 5/5 in all groups bilaterally.  Metatarsophalangeal, subtalar, and ankle range of motion are intact without crepitus.           Assessment / Plan:    Problem List Items Addressed This Visit     None      Visit Diagnoses     Type II diabetes mellitus with neurological manifestations    -  Primary    Relevant Orders    Routine Foot Care    Dermatophytosis of nail        Relevant Orders    Routine Foot Care          I counseled the patient on the patient's conditions, their implications and medical management.     Shoe inspection. Diabetic Foot Education. Patient reminded of the importance of good nutrition and blood sugar control to help prevent podiatric complications of diabetes. Patient instructed on proper foot hygiene. We discussed wearing proper shoe gear, daily foot inspections, never walking without protective shoe gear, never putting sharp instruments to feet, and routine diabetic foot exam and care every 3-6 months to prevent complications.      Discussed regular and routine moisturizer to skin of both feet to help improve dry skin. Advised to apply twice daily until resolution of symptoms. Avoid between toes.     Routine Foot Care  Date/Time: 1/17/2019 9:51 AM  Performed by: Stacey Rowland DPM  Authorized by: Stacey Rowland DPM     Consent Done?:  Yes (Verbal)    Nail Care Type:  Debride  Location(s): All  (Left 1st Toe, Left 3rd Toe, Left 2nd Toe, Left 4th Toe, Left 5th Toe, Right 1st Toe, Right 2nd Toe, Right 3rd Toe, Right 4th Toe and Right 5th Toe)  Patient tolerance:  Patient tolerated the procedure well with no immediate complications     With patient's permission, the toenails mentioned above were aggressively reduced and debrided using a nail nipper, removing all offending nail and debris. The  patient will continue to monitor the areas daily, inspect the feet, wear protective shoe gear when ambulatory, and moisturizer to maintain skin integrity.         This patient has documented high risk feet requiring routine maintenance secondary to diabetes     follow up 3-4 months or sooner if concerned.

## 2019-01-18 ENCOUNTER — TELEPHONE (OUTPATIENT)
Dept: BARIATRICS | Facility: CLINIC | Age: 54
End: 2019-01-18

## 2019-01-18 ENCOUNTER — DOCUMENTATION ONLY (OUTPATIENT)
Dept: BARIATRICS | Facility: CLINIC | Age: 54
End: 2019-01-18

## 2019-01-18 DIAGNOSIS — M17.0 PRIMARY OSTEOARTHRITIS OF BOTH KNEES: ICD-10-CM

## 2019-01-18 DIAGNOSIS — Z79.891 ENCOUNTER FOR LONG-TERM OPIATE ANALGESIC USE: ICD-10-CM

## 2019-01-18 DIAGNOSIS — G89.4 CHRONIC PAIN DISORDER: ICD-10-CM

## 2019-01-18 DIAGNOSIS — M47.816 FACET ARTHRITIS OF LUMBAR REGION: ICD-10-CM

## 2019-01-18 DIAGNOSIS — G89.29 BILATERAL CHRONIC KNEE PAIN: ICD-10-CM

## 2019-01-18 DIAGNOSIS — M25.561 BILATERAL CHRONIC KNEE PAIN: ICD-10-CM

## 2019-01-18 DIAGNOSIS — M47.816 SPONDYLOSIS OF LUMBAR REGION WITHOUT MYELOPATHY OR RADICULOPATHY: ICD-10-CM

## 2019-01-18 DIAGNOSIS — M25.562 BILATERAL CHRONIC KNEE PAIN: ICD-10-CM

## 2019-01-18 DIAGNOSIS — M51.36 DDD (DEGENERATIVE DISC DISEASE), LUMBAR: ICD-10-CM

## 2019-01-18 DIAGNOSIS — Z96.651 STATUS POST TOTAL RIGHT KNEE REPLACEMENT: ICD-10-CM

## 2019-01-18 NOTE — PROGRESS NOTES
Bariatric Surgery Online Course Form Submission  Someone has submitted a Bariatric Surgery Online Course Form Submission on this page.    Date: 01- 16:50:56    Patient's Name: Alivia Shearer  YOB: 1965 Email: [email or web address]  Phone: [phone]   Patient Address: [address] [place] [address]  Preferred Surgical Location: [facility]   I certify that I am 18 years of age or older: Yes   Confirmation Code: [id number] [id number]  Verification of Bariatric Seminar: yes  Insurance Information  Insurance or Self Pay? Insurance - Fill out fields below  Insurance Company Name: Medicare   Type of Coverage/Coverage Plan (i.e. PPO, HMO):   Group Name:   Subscriber Name:   Member Name (patient's name): Alivia Marie Cyr   Member ID/Policy #:[id number]   Plan Effective Date: 12/01/2013  Card Issuance date: 12/01/2013

## 2019-01-18 NOTE — TELEPHONE ENCOUNTER
----- Message from Angela Forrester sent at 1/18/2019 12:30 PM CST -----  Contact: DONTAE MOON [[id number]]                                                 MEDICATION  REFILL  REQUEST      Please refill the medication(s) listed below. Please call the patient when the prescription(s) is ready for  at this phone number   DONTAE MOON / # [phone]        Medication #1 oxyCODONE-acetaminophen (PERCOCET)  mg per tablet    Preferred Pharmacy: [facility] [address]   [phone] (Phone)  [phone] (Fax)

## 2019-01-18 NOTE — TELEPHONE ENCOUNTER
----- Message from Richelle Morris sent at 1/18/2019  1:33 PM CST -----  Contact: pt   Patient Requesting Sooner Appointment.     Reason for sooner appt.:pt is calling to speak with the nurse to schedule an appt pt completed the seminar Tuesday of Last week   When is the first available appointment? N/A   Communication Preference: can you please call pt at [phone]  Additional Information: none    TEDDY

## 2019-01-21 ENCOUNTER — TELEPHONE (OUTPATIENT)
Dept: BARIATRICS | Facility: CLINIC | Age: 54
End: 2019-01-21

## 2019-01-21 DIAGNOSIS — M25.561 CHRONIC PAIN OF BOTH KNEES: ICD-10-CM

## 2019-01-21 DIAGNOSIS — M25.562 CHRONIC PAIN OF BOTH KNEES: ICD-10-CM

## 2019-01-21 DIAGNOSIS — E11.65 UNCONTROLLED TYPE 2 DIABETES MELLITUS WITH HYPERGLYCEMIA: ICD-10-CM

## 2019-01-21 DIAGNOSIS — E78.5 HYPERLIPIDEMIA, UNSPECIFIED HYPERLIPIDEMIA TYPE: Primary | ICD-10-CM

## 2019-01-21 DIAGNOSIS — G47.30 SLEEP APNEA, UNSPECIFIED TYPE: ICD-10-CM

## 2019-01-21 DIAGNOSIS — G89.29 CHRONIC PAIN OF BOTH KNEES: ICD-10-CM

## 2019-01-21 DIAGNOSIS — E66.01 MORBID OBESITY WITH BMI OF 50.0-59.9, ADULT: ICD-10-CM

## 2019-01-21 DIAGNOSIS — D53.1 COMBINED B12 AND FOLATE DEFICIENCY ANEMIA: ICD-10-CM

## 2019-01-21 RX ORDER — OXYCODONE AND ACETAMINOPHEN 10; 325 MG/1; MG/1
1 TABLET ORAL EVERY 8 HOURS PRN
Qty: 90 TABLET | Refills: 0 | Status: SHIPPED | OUTPATIENT
Start: 2019-01-22 | End: 2019-02-21 | Stop reason: SDUPTHER

## 2019-01-21 NOTE — TELEPHONE ENCOUNTER
----- Message from Sarah Diamond sent at 1/21/2019 10:19 AM CST -----  Contact: self [phone]  Pt is requesting a call back to schedule her 3 mos follow up.  I attempted to schedule but the first available was 4.4.19 and pt stated that was too far out.    Pt may be reached at [phone].    Thank you.  WALE

## 2019-01-21 NOTE — TELEPHONE ENCOUNTER
Called to confirm consult appointment and online seminar appointment. Patient understands date, time and location of appointment.    Patient will come fasting

## 2019-01-22 RX ORDER — CELECOXIB 200 MG/1
CAPSULE ORAL
Qty: 30 CAPSULE | Refills: 1 | Status: SHIPPED | OUTPATIENT
Start: 2019-01-22 | End: 2019-03-19 | Stop reason: SDUPTHER

## 2019-01-22 RX ORDER — INSULIN LISPRO 100 [IU]/ML
INJECTION, SOLUTION INTRAVENOUS; SUBCUTANEOUS
Qty: 1 BOX | Refills: 6 | Status: SHIPPED | OUTPATIENT
Start: 2019-01-22 | End: 2019-05-06 | Stop reason: SDUPTHER

## 2019-01-22 NOTE — TELEPHONE ENCOUNTER
----- Message from Amanda Kahn sent at 1/22/2019  2:17 PM CST -----  Contact: self [phone]  Patient is at the pharmacy waiting to get her Humalog, at the [facility]. Please call her back and advise.     Thank you

## 2019-01-23 ENCOUNTER — CLINICAL SUPPORT (OUTPATIENT)
Dept: BARIATRICS | Facility: CLINIC | Age: 54
End: 2019-01-23
Payer: MEDICARE

## 2019-01-23 ENCOUNTER — HOSPITAL ENCOUNTER (OUTPATIENT)
Dept: RADIOLOGY | Facility: HOSPITAL | Age: 54
Discharge: HOME | End: 2019-01-23
Attending: PHYSICIAN ASSISTANT
Payer: MEDICARE

## 2019-01-23 ENCOUNTER — INITIAL CONSULT (OUTPATIENT)
Dept: BARIATRICS | Facility: CLINIC | Age: 54
End: 2019-01-23
Payer: MEDICARE

## 2019-01-23 ENCOUNTER — TELEPHONE (OUTPATIENT)
Dept: BARIATRICS | Facility: CLINIC | Age: 54
End: 2019-01-23

## 2019-01-23 VITALS
WEIGHT: 293 LBS | SYSTOLIC BLOOD PRESSURE: 141 MMHG | HEIGHT: 65 IN | BODY MASS INDEX: 48.82 KG/M2 | HEART RATE: 88 BPM | DIASTOLIC BLOOD PRESSURE: 73 MMHG

## 2019-01-23 DIAGNOSIS — J45.20 MILD INTERMITTENT ASTHMA WITHOUT COMPLICATION: ICD-10-CM

## 2019-01-23 DIAGNOSIS — E78.5 HYPERLIPIDEMIA, UNSPECIFIED HYPERLIPIDEMIA TYPE: ICD-10-CM

## 2019-01-23 DIAGNOSIS — E66.01 MORBID OBESITY WITH BMI OF 50.0-59.9, ADULT: ICD-10-CM

## 2019-01-23 DIAGNOSIS — E11.65 UNCONTROLLED TYPE 2 DIABETES MELLITUS WITH HYPERGLYCEMIA: ICD-10-CM

## 2019-01-23 DIAGNOSIS — G47.30 SLEEP APNEA, UNSPECIFIED TYPE: ICD-10-CM

## 2019-01-23 DIAGNOSIS — D53.1 COMBINED B12 AND FOLATE DEFICIENCY ANEMIA: ICD-10-CM

## 2019-01-23 DIAGNOSIS — G47.33 OSA ON CPAP: ICD-10-CM

## 2019-01-23 DIAGNOSIS — Z01.810 ENCOUNTER FOR PREPROCEDURAL CARDIOVASCULAR EXAMINATION: ICD-10-CM

## 2019-01-23 DIAGNOSIS — E66.01 MORBID OBESITY WITH BODY MASS INDEX (BMI) OF 60.0 TO 69.9 IN ADULT: Primary | ICD-10-CM

## 2019-01-23 PROCEDURE — 99215 OFFICE O/P EST HI 40 MIN: CPT | Mod: S$PBB,,, | Performed by: PHYSICIAN ASSISTANT

## 2019-01-23 PROCEDURE — 99499 UNLISTED E&M SERVICE: CPT | Mod: S$PBB,,, | Performed by: DIETITIAN, REGISTERED

## 2019-01-23 PROCEDURE — 99215 PR OFFICE/OUTPT VISIT, EST, LEVL V, 40-54 MIN: ICD-10-PCS | Mod: S$PBB,,, | Performed by: PHYSICIAN ASSISTANT

## 2019-01-23 PROCEDURE — 99999 PR PBB SHADOW E&M-EST. PATIENT-LVL II: ICD-10-PCS | Mod: PBBFAC,,, | Performed by: DIETITIAN, REGISTERED

## 2019-01-23 PROCEDURE — 99999 PR PBB SHADOW E&M-EST. PATIENT-LVL III: CPT | Mod: PBBFAC,,, | Performed by: PHYSICIAN ASSISTANT

## 2019-01-23 PROCEDURE — 71046 X-RAY EXAM CHEST 2 VIEWS: CPT | Mod: 26,,, | Performed by: RADIOLOGY

## 2019-01-23 PROCEDURE — 99213 OFFICE O/P EST LOW 20 MIN: CPT | Mod: PBBFAC,25 | Performed by: PHYSICIAN ASSISTANT

## 2019-01-23 PROCEDURE — 71046 XR CHEST PA AND LATERAL: ICD-10-PCS | Mod: 26,,, | Performed by: RADIOLOGY

## 2019-01-23 PROCEDURE — 99499 NO LOS: ICD-10-PCS | Mod: S$PBB,,, | Performed by: DIETITIAN, REGISTERED

## 2019-01-23 PROCEDURE — 71046 X-RAY EXAM CHEST 2 VIEWS: CPT | Mod: TC,FY

## 2019-01-23 PROCEDURE — 99212 OFFICE O/P EST SF 10 MIN: CPT | Mod: PBBFAC,25,27 | Performed by: DIETITIAN, REGISTERED

## 2019-01-23 PROCEDURE — 99999 PR PBB SHADOW E&M-EST. PATIENT-LVL II: CPT | Mod: PBBFAC,,, | Performed by: DIETITIAN, REGISTERED

## 2019-01-23 PROCEDURE — 99999 PR PBB SHADOW E&M-EST. PATIENT-LVL III: ICD-10-PCS | Mod: PBBFAC,,, | Performed by: PHYSICIAN ASSISTANT

## 2019-01-23 NOTE — PATIENT INSTRUCTIONS
Work on expanding variety of vegetables.  Work on gradually cutting back on starchy CHO in the diet.  Begin trying various protein supplements to determine preference.  5-6 meals per day.  More grocery shopping and meal preparation at home.  Increase exercise.  Return to clinic.  keep food log    Meal Ideas for Regular Bariatric Diet  *Recipes and products available at [email or web address].com      Breakfast: (15-20g protein)    - Egg white omelet: 2 egg whites or ½ cup Egg Beaters. (Optional proteins: cheese, shrimp, black beans, chicken, sliced turkey) (Optional veggies: tomatoes, salsa, spinach, mushrooms, onions, green peppers, or small slice avocado)     - Egg and sausage: 1 egg or ¼ cup Egg Beaters (any variety), with 1 tirso or 2 links of Turkey sausage or Veggie breakfast sausage ([employer] or [employer])    - Crust-less breakfast quiche: To make a glass pie dish, mix 4oz part skim Ricotta, 1 cup skim milk, and 2 eggs as your base. Add protein: shredded cheese, sliced lean ham or turkey, turkey weiss/sausage. Add veggies: tomato, onion, green onion, mushroom, green pepper, spinach, etc.    - Yogurt parfait: Mix 1 - 6oz container Dannon Light N Fit vanilla yogurt, with ¼ cup crushed unsalted nuts    - Cottage cheese and fruit: ½ cup part-skim cottage cheese or ricotta cheese topped with fresh fruit or sugar free preserves     - Mera Krishna's Vanilla Egg custard* (add 2 Tbsp instant coffee granules to make Cappuccino Custard*)    - Hi-Protein café latte (skim milk, decaf coffee, 1 scoop protein powder). Optional to add Sugar free syrup or extract flavoring.    - Breakfast Lox: spread fat free cream cheese on slices of smoked salmon. Serve over scrambled or egg over easy (sauteed with nonstick cookspray) OR on a cucumber slice    - Eggwhich: Scramble or cook 1 large egg over easy using nonstick cookspray. Place between 2 slices of Norwegian weiss and low fat cheese.     Lunch: (20-30g protein)    - ½ cup Black  bean soup (Homemade or Progresso), with ¼ cup shredded low-fat cheese. Top with chopped tomato or fresh salsa.     - Lean deli turkey breast and low-fat sliced cheese, mustard or light bravo to moisten, rolled up together, or wrapped in a Valeriy lettuce leaf    - Chicken salad made from dinner leftovers, moisten with low-fat salad dressing or light bravo. Also try leftover salmon, shrimp, tuna or boiled eggs. Serve ½ cup over dark green salad    - Fat-free canned refried beans, topped with ¼ cup shredded low-fat cheese. Top with chopped tomato or fresh salsa.     - Greek salad: Top mixed greens with 1-2oz grilled chicken, tomatoes, red onions, 2-3 kalamata olives, and sprinkle lightly with feta cheese. Spritz with Balsamic vinegar to taste.     - Crust-less lunch quiche: To make a glass pie dish, mix 4oz part skim Ricotta, 1 cup skim milk, and 2 eggs as your base. Add protein: shredded cheese, sliced lean ham or turkey, shrimp, chicken. Add veggies: tomato, onion, green onion, mushroom, green pepper, spinach, artichoke, broccoli, etc.    - Pizza bake: spread a  evelin luis mushroom with tomato sauce, low-fat shredded mozzarella and turkey pepperoni or Belizean weiss. Add any veggies. Roast for 10-15 minutes, until cheese melted.     - Cucumber crab bites: Spread ¼ cup crab dip (lump crabmeat + light cream cheese and green onions) over sliced cucumber.     - Chicken with light spinach and artichoke dip*: Puree in : 6oz cooked and drained spinach, 2 cloves garlic, 1 can cannelloni beans, ½ cup chopped green onions, 1 can drained artichoke hearts (not marinated in oil), lemon juice and basil. Mix in 2oz chopped up chicken.    Supper: (20-30g protein)    - Serve grilled fish over dark green salad tossed with low-fat dressing, served with grilled asparagus sevilla     - Rotisserie chicken salad: served with sliced strawberries, walnuts, fat-free feta cheese crumbles and 1 tbsp Andrades Own Light Raspberry  [place] Vinaigrette    - Shrimp cocktail: Dip cold boiled shrimp in homemade low-sugar cocktail sauce (1/2 cup Keara One Carb ketchup, 2 tbsp horseradish, 1/4 tsp hot sauce, 1 tsp Worcestershire sauce, 1 tbsp freshly-squeezed lemon juice). Serve with dark green salad, walnuts, and crumbled blue cheese drizzled with olive oil and Balsamic vinegar    - Tuna Melt: Spread tuna salad onto 2 thick slices of tomato. Top with low-fat cheese and broil until cheese is melted. May also be made with chicken salad of shrimp salad. Sleepy Eye with different types of cheeses.    - Chicken or beef fajitas (no tortilla, rice, beans, chips). Top meat and veggies w/ fresh salsa, fat free sour cream.     - Homemade low-fat Chili using extra lean ground beef or ground turkey. Top with shredded cheese and salsa as desired. May add dollop fat-free sour cream if desired    - Chicken parmesan: Top chicken breast w/ low sugar marinara sauce, mozzarella cheese and bake until chicken reaches 165*.  Serve w/ spaghetti SQUASH or Luxembourgish cut green beans    - Dinner Omelet with shrimp or chicken and onion, green peppers and chives.    - No noodle lasagna: Use sliced zucchini or eggplant in place of noodles.  Layer with part skim ricotta cheese and low sugar meat sauce (use very lean ground beef or ground turkey).    - Mexican chicken bake: Bake chunks of chicken breast or thigh with taco seasoning, Pace brand enchilada sauce, green onions and low-fat cheese. Serve with ¼ cup black beans or fat free refried beans topped with chopped tomatoes or salsa.    - Johnny frozen meatballs, simmered in Classico Marinara sauce. Different flavors of salsa or spaghetti sauce create different dishes! Sprinkle with parmesan cheese. Serve with grilled or steamed veggies, or a dark green salad.    - Simmer boneless skinless chicken thigh chunks in Classico Marinara sauce or roasted salsa until tender with chopped onion, bell pepper, garlic, mushrooms, spinach,  etc.     - Hamburger or veggie burger, without the bun, dressed the way you like. Served with grilled or steamed veggies.    - Eggplant parmesan: Bake slices of eggplant at 350 degrees for 15 minutes. Layer tomato sauce, sliced eggplant and low-fat mozzarella cheese in a baking dish and cover with foil. Bake 30-40 more minutes or until bubbly. Uncover and bake at 400 degrees for about 15 more minutes, or until top is slightly crisp.    - Fish tacos: grilled/baked white fish, wrapped in Valeriy lettuce leaf, topped with salsa, shredded low-fat cheese, and light coleslaw.    - Chicken jim: Sprinkle chicken w/ 1 tsp of hidden valley ranch dip mix. Then grill chicken and top with black beans, salsa and 1 tsp fat free sour cream.     - Cauliflower pizza crust: Use cauliflower as crust (see recipe on miranda, no flour!). Top w/ low fat cheese, turkey pepperoni and veggies and bake again    - chicken or turkey crust pizza: use ground chicken or turkey instead of cauliflower, spread in [place] and bake at 350 for about 20-30 minutes(may want to add garlic, black pepper, oregano and other herbs to ground meat mixture).  Remove and top w/ low fat cheese, turkey pepperoni and veggies and bake again for another 10 minutes or until cheese is browned.     Snacks: (100-200 calories; >5g protein)    - 1 low-fat cheese stick with 8 cherry tomatoes or 1 serving fresh fruit  - 4 thin slices fat-free turkey breast and 1 slice low-fat cheese  - 4 thin slices fat-free honey ham with wedge of melon  - 6-8 edamame pods (equivalent to about 1/4 cup edamame without pods).   - 1/4 cup unsalted nuts with ½ cup fruit  - 6-oz container Dannon Light n Fit vanilla yogurt, topped with 1oz unsalted nuts         - apple, celery or baby carrots spread with 2 Tbsp PB2  - apple slices with 1 oz slice low-fat cheese  - Apple slices dipped in 2 Tbsp of PB2  - celery, cucumber, bell pepper or baby carrots dipped in ¼ cup hummus bean spread or light  spinach and artichoke dip (*recipe in lunch section)  - celery, cucumber, baby carrots dipped in high protein greek yogurt (Mix 16 oz plain greek yogurt + 1 packet of hidden valley ranch dip mix)  - Catalino Links Beef Steak - 14g protein! (similar to beef jerky)  - 2 wedges Laughing Cow - Light Herb & Garlic Cheese with sliced cucumber or green bell pepper  - 1/2 cup low-fat cottage cheese with ¼ cup fruit or ¼ cup salsa  - RTD Protein drinks: Atkins, Low Carb Slim Fast, EAS light, Muscle Milk Light, etc.  - Homemade Protein drinks: GNC Soy95, Isopure, Nectar, UNJURY, Whey Gourmet, etc. Mix 1 scoop powder with 8oz skim/1% milk or light soymilk.  - Protein bars: Atkins, EAS, Pure Protein, Think Thin, Detour, etc. Must have 0-4 grams sugar - Read the label.    Takeout Options: No more than twice/week  Deli - Salads (no pasta or rice), meats, cheeses. Roasted chicken. Lox (salmon)    Mexican - Platters which don't include tortillas, chips, or rice. Go easy on the beans. Example: Fajitas without the tortillas. Ask the  not to bring chips to the table if they are too tempting.    Greek - Meat or fish and vegetable, but no bread or rice. Including hummus, baba ganoush, etc, is OK. Most sit-down Greek restaurants can provide you with cucumber slices for dipping instead of jaye bread.    Fast Food (Avoid as much as possible) - Salads (no croutons and limit salad dressing to 2 tbsp), grilled chicken sandwich without the bun and ask for no bravo. Zuleikas low fat chili or Taco Bell pintos and cheese.    BBQ - The meats are fine if you ask for sauces on the side, but most of the traditional side dishes are loaded with carbs. Shubham slaw, baked beans and BBQ sauce are typically made with sugar.    Chinese - Nothing deep-fried, no rice or noodles. Many Chinese sauces have starch and sugar in them, so you'll have to use your judgement. If you find that these sauces trigger cravings, or cause Dumping, you can ask for the sauce to  be made without sugar or just use soy sauce.
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
## 2019-01-23 NOTE — LETTER
January 23, 2019      Clairsse Richardson MD  [address]  [facility] [address]           [facility] B[facility]ric [facility]  [address]  [facility] [address]  Phone: [phone]  Fax: [phone]          Patient: Alivia Thomas   MR Number: [id number]   YOB: 1965   Date of Visit: 1/23/2019       Dear Dr. Clarisse Richardson:    Thank you for referring Alivia Thomas to me for evaluation. Attached you will find relevant portions of my assessment and plan of care.    If you have questions, please do not hesitate to call me. I look forward to following Alivia Thomas along with you.    Sincerely,    JANET Palaciososure  CC:  No Recipients    If you would like to receive this communication electronically, please contact [email or web address] or [phone] to request more information on [facility] access.    For providers and/or their staff who would like to refer a patient to [facility], please contact us through our one-stop-shop provider referral line, [facility], at [phone].    If you feel you have received this communication in error or would no longer like to receive these types of communications, please e-mail [email or web address]

## 2019-01-23 NOTE — PROGRESS NOTES
"NUTRITIONAL CONSULT    Referring Physician: Heriberto Clements M.D.  Reason for MNT Referral: Initial assessment for laparoscopic Bev-en-Y work-up    PAST MEDICAL HISTORY:   53 y.o. female  Dieting attempts include Adipex, [facility], Topomax, and Sugar Busters along with multiple tries at exercise.     Past Medical History:   Diagnosis Date    Allergy     Anemia     Asthma     Diabetes mellitus type II     DJD (degenerative joint disease) of knee 6/19/2014    GERD (gastroesophageal reflux disease)     Heartburn     Hyperlipidemia     Morbid obesity     Neuromuscular disorder     Sleep apnea        CLINICAL DATA:  53 y.o.-year-old Black or  female.  Height: 5'5"  Weight: 368 lbs  IBW: 141 lbs  BMI: 61.3  The patient's goal weight (50% EBW): 254 lbs  Personal goal weight: 200-250 lbs    Goal for Bariatric Surgery: to improve quality of life and to lose weight    NUTRITIONAL NEEDS:  2270 x 1.2 activity factor = 2724 - 1000 for wt loss = 1724 Calories (using [place] [address] Jeor Equation)   Grams Protein (1.5-1.8 gm/kg IBW)    NUTRITION & HEALTH HISTORY:  Greatest challenge: starchy CHO and emotional eating    Current diet recall:   Breakfast: protein shake with apple or turkey sandwich with swiss (on honey wheat) or grits, eggs, weiss or sausage biscuit or egg mcMuffin with hashbrowns, coffee or cereal   Lunch: none or chips  Dinner: 1/2 sandwich or salad or apple with cashews or hamburger tirso, cheese, salad with beet and ranch dressing or chicken with pasta, peas or red beans with rice or ramen soup      Current Diet:  Protein supplements: 1   Snacking: none  Vegetables: Likes a variety. Eats daily.  Fruits: Likes a variety. Eats daily.  Beverages: water, diet soda, sugar-free beverages, coffee without sugar and coffee and diet soda on occasion  Dining out: Weekly. Mostly restaurants.  Cooking at home: Weekly. Mostly baked, grilled and fried meat, fish, starchy CHO and " vegetables.    Exercise:  Current exercise: decreased past few months - was doing swimming and gym x 3-5/wk - planning to restart soon  Restrictions to exercise: pain in knees/back    Vitamins / Minerals / Herbs:   MV with iron      Labs:   no recent     Food Allergies:   strawberry    Social:  Retired.  Lives with her wife (Dad lives down the street, 96y - she helps take care of him).  Grocery shopping and food prep - both complete tasks.  Patient believes the household will be supportive after surgery.  Alcohol: None.  Smoking: None.    ASSESSMENT:  · Patient reports attempts at weight loss, only to regain lost weight.  · Patient demonstrated knowledge of healthy eating behaviors and exercise patterns; admits to not eating healthy and not exercising at this point.  · Patient states willingness to change lifestyle and make behavior modifications as evidenced by pt's plan to make better choices after today's visit.    Insurance does not require medically supervised diet prior to consideration for bariatric surgery.    Barriers to Education: none    Stage of change: determination    NUTRITION DIAGNOSIS:    Obesity related to Excessive calorie intake and Physical inactivity as evidence by BMI.    BARIATRIC DIET DISCUSSION/PLAN:  Discussed diet after surgery and related to patient's food record.  Reviewed nutrition guidelines for before and after surgery.  Answered all questions.  Work on expanding variety of vegetables.  Work on gradually cutting back on starchy CHO in the diet.  Begin trying various protein supplements to determine preference.  5-6 meals per day.  More grocery shopping and meal preparation at home.  Increase exercise.  Return to clinic.  keep food log    RECOMMENDATIONS:  Patient is a potential candidate for bariatric surgery.    Needs additional visit(s) with RD.    Patient and Spouse verbalized understanding.    Expect good  compliance after surgery at this time.    Communicated nutrition plan with  bariatric team.    SESSION TIME:  75 minutes

## 2019-01-23 NOTE — TELEPHONE ENCOUNTER
Spoke to patient to schedule stress test. Patient has appointment scheduled and understands, date time and location

## 2019-01-23 NOTE — PATIENT INSTRUCTIONS
Prior to surgery you will need to complete:  - Dietitian consult and follow up appointments as needed  - PCP clearance  - Labs  - Chest X-ray  - EKG  - Psychological evaluation, Please call psychiatry [phone] to schedule  - Sleep Study  - Stress Test   - IVC filter   - Mental Health Contract   - Sleep Medicine contact     In preparation for bariatric surgery, please complete the following:   · Discuss your current medications with your primary care provider, remember your medications will need to be crushed, chewable, or in liquid form for the first 3-6 months after a gastric bypass or sleeve.  For a gastric band, your medications will need to be crushed indefinitely.    · If you take a blood thinner such as: Coumadin (warfarin), Pradaxa (dabigatran), or Plavix (clopidogrel), you will need to speak with your prescribing provider on how or if this medication can be stopped before surgery.   · If you take a medication for depression or anxiety, you will need to begin crushing or opening the capsule 1-3 months prior to surgery.  Remember to discuss this with the psychologist or psychiatrist that you see.   · If you take medication for arthritis on a daily basis that is considered a non-steroidal anti-inflammatory (NSAID), please discuss with your prescribing physician an alternative medication.  After having gastric bypass or gastric sleeve, this group of medications is not appropriate to take due to increased risk of bleeding stomach ulcers.      DEFINITIONS  Appointments: Pre-scheduled meetings or consultations with any physician, advanced practice provider, dietitian, or psychologist, and labs, imaging studies, sleep studies, etc.   Late cancellation: Cancelling an appointment 24-48 hours prior to scheduled time.  No-Show appointment:  is when    You do NOT arrive to your appointment at the time its scheduled.   You call to cancel or cancel via [facility] less than 24 hours in advance of your scheduled  appointment   You show up 15 minutes AFTER your scheduled appointment time without any notification of being late.     POLICY  1. You are allowed up to 3 cancellations for appointments.    After 3 cancellations your case will be placed on hold for 2 months and after that time you can resume the program.   2. You are allowed only 1 no-show for an appointment.    You will be re-scheduled one time and if there is a 2nd no-show at any point, your case will be placed on hold for 3 months.  After 3 months you can resume the program.     3. Upon resuming the program after being placed on hold for either above mentioned reasons, if you have a late cancel or no show for any appointment, the bariatric team will review if youre an appropriate candidate for surgery at the monthly meeting.

## 2019-01-24 ENCOUNTER — TELEPHONE (OUTPATIENT)
Dept: CARDIOLOGY | Facility: CLINIC | Age: 54
End: 2019-01-24

## 2019-01-24 ENCOUNTER — TELEPHONE (OUTPATIENT)
Dept: BARIATRICS | Facility: CLINIC | Age: 54
End: 2019-01-24

## 2019-01-24 DIAGNOSIS — E11.65 UNCONTROLLED TYPE 2 DIABETES MELLITUS WITH HYPERGLYCEMIA: ICD-10-CM

## 2019-01-24 DIAGNOSIS — E66.01 MORBID OBESITY WITH BODY MASS INDEX OF 60.0-69.9 IN ADULT: ICD-10-CM

## 2019-01-24 DIAGNOSIS — E66.01 MORBID OBESITY WITH BMI OF 60.0-69.9, ADULT: Primary | ICD-10-CM

## 2019-01-24 DIAGNOSIS — E78.5 HYPERLIPIDEMIA, UNSPECIFIED HYPERLIPIDEMIA TYPE: Primary | ICD-10-CM

## 2019-01-24 DIAGNOSIS — J45.20 MILD INTERMITTENT ASTHMA WITHOUT COMPLICATION: ICD-10-CM

## 2019-01-24 DIAGNOSIS — G47.33 OSA ON CPAP: ICD-10-CM

## 2019-01-24 DIAGNOSIS — Z01.810 ENCOUNTER FOR PREPROCEDURAL CARDIOVASCULAR EXAMINATION: ICD-10-CM

## 2019-01-24 NOTE — TELEPHONE ENCOUNTER
----- Message from Rosa Noble RN sent at 1/24/2019  7:49 AM CST -----  Regarding: nuclear stress test  Hi, I have mrn [id number] for a Nuclear stress test 1/25/19, but her BMI is too large for this test. We recommend a Cardiac PET stress test. Please follow up with pt to schedule a different test.    Rosa Noble RN  [phone]

## 2019-01-25 ENCOUNTER — TELEPHONE (OUTPATIENT)
Dept: BARIATRICS | Facility: CLINIC | Age: 54
End: 2019-01-25

## 2019-01-25 NOTE — TELEPHONE ENCOUNTER
Telephone note:     Reviewed labs vit D and iron.  Discussed adding vit D 2000IU daily and take MV with iron x 2/day (instead of 1/day).  Encouraged pt to monitor stools and if unable to tolerate MV x 2/day, contact RD.    All questions answered and encouraged to call if questions arise.

## 2019-01-28 ENCOUNTER — OFFICE VISIT (OUTPATIENT)
Dept: INTERNAL MEDICINE | Facility: CLINIC | Age: 54
End: 2019-01-28
Payer: MEDICARE

## 2019-01-28 ENCOUNTER — TELEPHONE (OUTPATIENT)
Dept: INTERNAL MEDICINE | Facility: CLINIC | Age: 54
End: 2019-01-28

## 2019-01-28 VITALS
SYSTOLIC BLOOD PRESSURE: 136 MMHG | HEART RATE: 84 BPM | DIASTOLIC BLOOD PRESSURE: 82 MMHG | HEIGHT: 65 IN | WEIGHT: 293 LBS | BODY MASS INDEX: 48.82 KG/M2

## 2019-01-28 DIAGNOSIS — F32.A DEPRESSION, UNSPECIFIED DEPRESSION TYPE: Primary | ICD-10-CM

## 2019-01-28 DIAGNOSIS — F33.41 RECURRENT MAJOR DEPRESSIVE DISORDER, IN PARTIAL REMISSION: ICD-10-CM

## 2019-01-28 PROBLEM — M46.1 SACROILIITIS: Status: RESOLVED | Noted: 2018-06-13 | Resolved: 2019-01-28

## 2019-01-28 PROCEDURE — 99213 OFFICE O/P EST LOW 20 MIN: CPT | Mod: PBBFAC | Performed by: INTERNAL MEDICINE

## 2019-01-28 PROCEDURE — 99214 PR OFFICE/OUTPT VISIT, EST, LEVL IV, 30-39 MIN: ICD-10-PCS | Mod: S$PBB,,, | Performed by: INTERNAL MEDICINE

## 2019-01-28 PROCEDURE — 99214 OFFICE O/P EST MOD 30 MIN: CPT | Mod: S$PBB,,, | Performed by: INTERNAL MEDICINE

## 2019-01-28 PROCEDURE — 99999 PR PBB SHADOW E&M-EST. PATIENT-LVL III: CPT | Mod: PBBFAC,,, | Performed by: INTERNAL MEDICINE

## 2019-01-28 PROCEDURE — 99999 PR PBB SHADOW E&M-EST. PATIENT-LVL III: ICD-10-PCS | Mod: PBBFAC,,, | Performed by: INTERNAL MEDICINE

## 2019-01-28 RX ORDER — FLUOXETINE 20 MG/1
20 TABLET ORAL DAILY
Qty: 30 TABLET | Refills: 2 | Status: SHIPPED | OUTPATIENT
Start: 2019-01-28 | End: 2019-05-06

## 2019-01-28 NOTE — TELEPHONE ENCOUNTER
Spoke with patient, she agree to reschedule her appointment to 01/28/19 at 1:00p, at [facility] care

## 2019-01-28 NOTE — PROGRESS NOTES
Subjective:       Patient ID: Alivia Thomas is a 53 y.o. female.    Chief Complaint: Follow-up    Patient is here for followup for chronic conditions.    Was supposed to see Dr. Clarisse Richardson MD but appt was r/sed to me due to family emergency.    May need RFs.    Now attending bariatric surgery.    Wonders whether prozac in liqu form in prep for WLS.    L hand pains, posterior area. No injury/trauma.      Review of Systems   Constitutional: Negative for activity change.   HENT: Negative for congestion.    Respiratory: Positive for shortness of breath (stable). Negative for cough.    Cardiovascular: Negative for chest pain, palpitations and leg swelling.   Gastrointestinal: Negative for abdominal distention, nausea and vomiting.   Musculoskeletal: Positive for arthralgias. Negative for joint swelling.   Skin: Negative for rash and wound.       Objective:      Physical Exam   Constitutional: She is oriented to person, place, and time. She appears well-developed and well-nourished. No distress.   Difficulty ambulating   HENT:   Head: Normocephalic and atraumatic.   Eyes: Pupils are equal, round, and reactive to light. No scleral icterus.   Neck: Normal range of motion. No thyromegaly present.   Cardiovascular: Normal rate, regular rhythm and normal heart sounds. Exam reveals no gallop and no friction rub.   No murmur heard.  Pulmonary/Chest: Effort normal and breath sounds normal. No respiratory distress. She has no wheezes. She has no rales.   Abdominal: Soft. Bowel sounds are normal. She exhibits no distension and no mass. There is no tenderness. There is no rebound and no guarding.   Musculoskeletal: Normal range of motion. She exhibits no edema or tenderness.   L wrist mild tenderness dorsal area, nonfocal. Tenderness elicited with hand flexion.   Lymphadenopathy:     She has no cervical adenopathy.   Neurological: She is alert and oriented to person, place, and time.   Skin: She is not diaphoretic.    Psychiatric: She has a normal mood and affect. Her speech is normal and behavior is normal. Cognition and memory are normal.       Assessment:       1. Depression, unspecified depression type    2. Uncontrolled type 2 diabetes mellitus without complication, with long-term current use of insulin    3. Recurrent major depressive disorder, in partial remission        Plan:       Alivia was seen today for follow-up.    Diagnoses and all orders for this visit:    Depression, unspecified depression type  -     FLUoxetine 20 MG tablet; Take 1 tablet (20 mg total) by mouth once daily.  Given tab in prep for WLS    Uncontrolled type 2 diabetes mellitus without complication, with long-term current use of insulin  -     insulin detemir U-100 (LEVEMIR FLEXPEN) 100 unit/mL (3 mL) SubQ InPn pen; Inject 40 Units into the skin every evening. Inject 40 units every evening    Recurrent major depressive disorder, in partial remission  Has been stable    L wrist pains -- likely soft tissue injury. Discussed ice and rest.  Explained that if not better in 1-2 weeks, pt should rtc/call PCP then xray    See back Clarisse Richardson MD in 3 months    Health Maintenance       Date Due Completion Date    Sign Pain Contract 12/07/1982 ---    Complete Opioid Risk Tool 12/07/1982 ---    Foot Exam 05/05/2018 5/5/2017    Eye Exam 02/09/2019 2/9/2018    Hemoglobin A1c 04/23/2019 1/23/2019    Mammogram 05/17/2019 5/17/2018    Lipid Panel 01/23/2020 1/23/2019    Pap Smear with HPV Cotest 05/17/2021 5/17/2018    Colonoscopy 03/13/2022 3/13/2012    TETANUS VACCINE 04/01/2025 4/1/2015          Follow-up in about 3 months (around 4/28/2019) for see back Dr Richardson.    Future Appointments   Date Time Provider Department Center   2/7/2019 10:30 AM CARDIAC, PET IMAGING [facility] CARDPET [place]   2/8/2019  8:00 AM YOANDY, PSYCHIATRY TESTING [facility] PSYCHOL [facility]   2/11/2019  8:00 AM Justin Dodson, RAE [facility] OPTOMTY [place]   2/11/2019 10:30 AM Norah  JIMENEZ Arthur [facility] BARIAT [facility]   2/20/2019  8:00 AM Dniorah Suárez, PhD [facility] PSYCHOL [facility]   2/26/2019  9:40 AM EMILY Huggins [facility] PAINMGT [facility]   3/6/2019  9:15 AM Theodore Swan MD [facility] ORTHO [facility]   3/7/2019  9:15 AM Leeanne Silva MD [facility] OBGYNF [facility]   3/11/2019 10:00 AM AUDIOGRAM, AUDIO [facility] AUDIO [facility]   3/11/2019 10:30 AM Silver Hutson MD [facility] ENT [facility]   4/17/2019  8:45 AM Stacey Rowland DPM [facility] PODIATR [facility]

## 2019-01-29 ENCOUNTER — HOSPITAL ENCOUNTER (OUTPATIENT)
Facility: OTHER | Age: 54
Discharge: HOME | End: 2019-01-29
Attending: ANESTHESIOLOGY | Admitting: ANESTHESIOLOGY
Payer: MEDICARE

## 2019-01-29 ENCOUNTER — TELEPHONE (OUTPATIENT)
Dept: INTERNAL MEDICINE | Facility: CLINIC | Age: 54
End: 2019-01-29

## 2019-01-29 VITALS
WEIGHT: 293 LBS | HEIGHT: 65 IN | HEART RATE: 72 BPM | DIASTOLIC BLOOD PRESSURE: 67 MMHG | BODY MASS INDEX: 48.82 KG/M2 | TEMPERATURE: 99 F | OXYGEN SATURATION: 98 % | SYSTOLIC BLOOD PRESSURE: 123 MMHG | RESPIRATION RATE: 18 BRPM

## 2019-01-29 DIAGNOSIS — M47.816 LUMBAR SPONDYLOSIS: Primary | ICD-10-CM

## 2019-01-29 LAB — POCT GLUCOSE: 164 MG/DL (ref 70–110)

## 2019-01-29 PROCEDURE — 63600175 PHARM REV CODE 636 W HCPCS: Performed by: ANESTHESIOLOGY

## 2019-01-29 PROCEDURE — 99152 PR MOD CONSCIOUS SEDATION, SAME PHYS, 5+ YRS, FIRST 15 MIN: ICD-10-PCS | Mod: ,,, | Performed by: ANESTHESIOLOGY

## 2019-01-29 PROCEDURE — 64635 DESTROY LUMB/SAC FACET JNT: CPT | Mod: RT,,, | Performed by: ANESTHESIOLOGY

## 2019-01-29 PROCEDURE — 64636 DESTROY L/S FACET JNT ADDL: CPT | Performed by: ANESTHESIOLOGY

## 2019-01-29 PROCEDURE — 64636 DESTROY L/S FACET JNT ADDL: CPT | Mod: RT,,, | Performed by: ANESTHESIOLOGY

## 2019-01-29 PROCEDURE — 64636 PR DESTROY L/S FACET JNT ADDL: ICD-10-PCS | Mod: RT,,, | Performed by: ANESTHESIOLOGY

## 2019-01-29 PROCEDURE — 82947 ASSAY GLUCOSE BLOOD QUANT: CPT | Performed by: ANESTHESIOLOGY

## 2019-01-29 PROCEDURE — 64635 DESTROY LUMB/SAC FACET JNT: CPT | Performed by: ANESTHESIOLOGY

## 2019-01-29 PROCEDURE — 25000003 PHARM REV CODE 250: Performed by: ANESTHESIOLOGY

## 2019-01-29 PROCEDURE — 99152 MOD SED SAME PHYS/QHP 5/>YRS: CPT | Mod: ,,, | Performed by: ANESTHESIOLOGY

## 2019-01-29 PROCEDURE — 64635 PR DESTROY LUMB/SAC FACET JNT: ICD-10-PCS | Mod: RT,,, | Performed by: ANESTHESIOLOGY

## 2019-01-29 RX ORDER — BUPIVACAINE HYDROCHLORIDE 2.5 MG/ML
INJECTION, SOLUTION EPIDURAL; INFILTRATION; INTRACAUDAL
Status: DISCONTINUED | OUTPATIENT
Start: 2019-01-29 | End: 2019-01-29 | Stop reason: HOSPADM

## 2019-01-29 RX ORDER — SODIUM CHLORIDE 9 MG/ML
INJECTION, SOLUTION INTRAVENOUS CONTINUOUS
Status: DISCONTINUED | OUTPATIENT
Start: 2019-01-29 | End: 2019-01-29 | Stop reason: HOSPADM

## 2019-01-29 RX ORDER — FENTANYL CITRATE 50 UG/ML
INJECTION, SOLUTION INTRAMUSCULAR; INTRAVENOUS
Status: DISCONTINUED | OUTPATIENT
Start: 2019-01-29 | End: 2019-01-29 | Stop reason: HOSPADM

## 2019-01-29 RX ORDER — MIDAZOLAM HYDROCHLORIDE 1 MG/ML
INJECTION INTRAMUSCULAR; INTRAVENOUS
Status: DISCONTINUED | OUTPATIENT
Start: 2019-01-29 | End: 2019-01-29 | Stop reason: HOSPADM

## 2019-01-29 RX ORDER — LIDOCAINE HYDROCHLORIDE 10 MG/ML
INJECTION INFILTRATION; PERINEURAL
Status: DISCONTINUED | OUTPATIENT
Start: 2019-01-29 | End: 2019-01-29 | Stop reason: HOSPADM

## 2019-01-29 NOTE — OP NOTE
Lumbar Medial nerve branch block radiofrequency ablation Under Fluoroscopy     Time-out taken to identify patient and procedure side prior to starting the procedure.     01/29/2019    PROCEDURE: Right radiofrequency ablation of the the medial branch nerves at the   transverse process of  L3, L4, L5 and sacral ala    2)Conscious sedation provided by MD     REASON FOR PROCEDURE: Lumbar spondylosis [M47.816]     PHYSICIAN: Lina Wagner MD     ASSISTANTS: None     MEDICATIONS INJECTED: 0.25% Bupivicaine total 8mL     LOCAL ANESTHETIC USED: Xylocaine 1% 1mL / site     ESTIMATED BLOOD LOSS: None.     COMPLICATIONS: None.     Interval history: Patient reports that he had complete relief of pain for the day of the procedure, we will proceed with the RFA     TECHNIQUE: Laying in a prone position, the patient was prepped and draped in the usual sterile fashion using ChloraPrep and fenestrated drape. The level was determined under fluoroscopic guidance. Local anesthetic was given by going down to the hub of the 27-gauge 1.25in needle and raising a wheel. A 18-gauge 10mm curved active tip needle was introduced to the anatomic local of the medial branch at each of the above levels using fluoroscopy. Then sensory and motor testing was performed to confirm that the needle tips were in the correct location. Then after negative aspiration, 1 mL of 0.25% bupivacaine was injected into each level. This was followed by thermal lesioning at 80 degrees celsius for 90 seconds.     Conscious sedation provided by M.D   The patient was monitored with continuous pulse oximetry, EKG, and intermittent blood pressure monitors. The patient was hemodynamically stable throughout the entire process was responsive to voice, and breathing spontaneously. Supplemental O2 was provided at 2L/min via nasal cannula. Patient was comfortable for the duration of the procedure. (See nurse documentation and case log for sedation time)    There was a total of  2mg IV Midazolam and 75mcg Fentanyl titrated for the procedure    The patient tolerated the procedure well. Was able to move their leg at the knee and ankle at the conclusion of the procedure    The patient was monitored after the procedure. Patient was given post procedure and discharge instructions to follow at home. We will see the patient back in two weeks or the patient may call to inform of status. The patient was discharged in a stable condition

## 2019-01-29 NOTE — DISCHARGE SUMMARY
Discharge Note  Short Stay      SUMMARY     Admit Date: 1/29/2019    Attending Physician: Lina Wagner      Discharge Physician: Lina Wagner      Discharge Date: 1/29/2019 8:50 AM    Procedure(s) (LRB):  Radiofrequency Ablation RIGHT LUMBAR L2,3,4,5 RFA (Right)    Final Diagnosis: Lumbar spondylosis [M47.816]    Disposition: Home or self care    Patient Instructions:   Current Discharge Medication List      CONTINUE these medications which have NOT CHANGED    Details   aspirin (ECOTRIN) 81 MG EC tablet Take 1 tablet by mouth every morning. prevents heart attacks and strokes      atorvastatin (LIPITOR) 20 MG tablet TAKE 1 TABLET BY MOUTH DAILY  Qty: 30 tablet, Refills: 10    Associated Diagnoses: Hyperlipidemia, unspecified hyperlipidemia type      !! blood sugar diagnostic Strp Freestyle light strips and lancets  Qty: 100 each, Refills: 6    Associated Diagnoses: Uncontrolled type 2 diabetes mellitus without complication, with long-term current use of insulin      !! blood sugar diagnostic Strp Check glucose four times daily Strips and lancets covered by insurance E11.9 - One touch  Qty: 120 each, Refills: 6      blood-glucose meter kit Check glucose four times daily E11.9 meter covered by insurance [employer]  Qty: 1 each, Refills: 0      celecoxib (CELEBREX) 200 MG capsule TAKE 1 CAPSULE BY MOUTH DAILY  Qty: 30 capsule, Refills: 1    Associated Diagnoses: Chronic pain of both knees      fluconazole (DIFLUCAN) 150 MG Tab TAKE ONE TABLET BY MOUTH ONCE DAILY  Qty: 3 tablet, Refills: 1    Comments: Please consider 90 day supplies to promote better adherence      FLUoxetine 20 MG tablet Take 1 tablet (20 mg total) by mouth once daily.  Qty: 30 tablet, Refills: 2    Associated Diagnoses: Depression, unspecified depression type      fluticasone (FLONASE) 50 mcg/actuation nasal spray 1 spray by Each Nare route 2 (two) times daily as needed for Rhinitis.  Qty: 15 g, Refills: 0      insulin detemir U-100 (LEVEMIR  "FLEXPEN) 100 unit/mL (3 mL) SubQ InPn pen Inject 40 Units into the skin every evening. Inject 40 units every evening  Qty: 36 mL, Refills: 0    Associated Diagnoses: Uncontrolled type 2 diabetes mellitus without complication, with long-term current use of insulin      insulin lispro (HUMALOG KWIKPEN INSULIN) 100 unit/mL pen INJECT 11 UNITS SUBCUTANEOUSLY BEFORE MEAL(S) PLUS  SLIDING  SCALE max 63 Units per day  Qty: 1 Box, Refills: 6      lisinopril (PRINIVIL,ZESTRIL) 2.5 MG tablet TAKE 1 TABLET BY MOUTH DAILY FOR PROTEINURIA  Qty: 30 tablet, Refills: 10    Associated Diagnoses: Proteinuria, unspecified type      metFORMIN (GLUCOPHAGE-XR) 500 MG 24 hr tablet TAKE 2 TABLETS BY MOUTH TWICE DAILY  Qty: 360 tablet, Refills: 10    Associated Diagnoses: Uncontrolled type 2 diabetes mellitus without complication, with long-term current use of insulin      MULTIVIT-IRON-MIN-FOLIC ACID 3,500-18-0.4 UNIT-MG-MG ORAL CHEW Take 1 tablet by mouth once daily.      omeprazole (PRILOSEC) 20 MG capsule TAKE 1 CAPSULE BY MOUTH EVERY MORNING.  Qty: 30 capsule, Refills: 10    Associated Diagnoses: Gastroesophageal reflux disease without esophagitis      oxyCODONE-acetaminophen (PERCOCET)  mg per tablet Take 1 tablet by mouth every 8 (eight) hours as needed for Pain.  Qty: 90 tablet, Refills: 0    Associated Diagnoses: Spondylosis of lumbar region without myelopathy or radiculopathy; Facet arthritis of lumbar region; DDD (degenerative disc disease), lumbar; Primary osteoarthritis of both knees; Bilateral chronic knee pain; Status post total right knee replacement; Chronic pain disorder; Encounter for long-term opiate analgesic use      pen needle, diabetic 33 gauge x 5/32" Ndle 1 application by Misc.(Non-Drug; Combo Route) route 4 (four) times daily with meals and nightly.  Qty: 100 each, Refills: 6      VENTOLIN HFA 90 mcg/actuation inhaler INHALE TWO PUFFS INTO LUNGS EVERY 4 TO 6 HOURS AS NEEDED FOR SHORTNESS OF BREATH AND FOR " WHEEZING  Qty: 18 each, Refills: 0    Comments: Please consider 90 day supplies to promote better adherence  Associated Diagnoses: Asthma, well controlled, mild intermittent      vitamin D 185 MG Tab Take 5,000 mg by mouth once daily.       !! - Potential duplicate medications found. Please discuss with provider.              Discharge Diagnosis: Lumbar spondylosis [M47.816]  Condition on Discharge: Stable with no complications to procedure   Diet on Discharge: Same as before.  Activity: as per instruction sheet.  Discharge to: Home with a responsible adult.  Follow up: 2-4 weeks

## 2019-01-29 NOTE — DISCHARGE INSTRUCTIONS

## 2019-01-29 NOTE — TELEPHONE ENCOUNTER
----- Message from Janna Davis sent at 1/29/2019  2:46 PM CST -----  Contact: [facility]  Prior Authorization Needed    Medication: insulin detemir U-100 (LEVEMIR FLEXPEN) 100 unit/mL (3 mL) SubQ InPn pen    Pharmacy Info: [id number] - [place], [place] - [address]    Plan does not cover this medication. Please call plan at [phone] to initiate prior authorization or call/fax pharmacy to change medication. Patient ID#[id number] [facility]    Note chart when prior authorization has been submitted.    Please notify pharmacy when prior authorization has been approved.    Thank You

## 2019-01-29 NOTE — H&P
"HPI  54 yo female pmhx lumbar spondylosis, DDD, chronic pain. She has many  RFA's in the past with significant relief.    Patient presenting for  Procedure(s) (LRB):  Radiofrequency Ablation RIGHT LUMBAR L2,3,4,5 RFA (Right)    No health changes since previous encounter    PMHx, PSHx, Allergies, Medications reviewed in epic    ROS negative except pain complaints in HPI    OBJECTIVE:    BP (!) 164/85   Pulse 86   Temp 98.5 °F (36.9 °C) (Oral)   Resp 18   Ht 5' 5" (1.651 m)   Wt (!) 166.9 kg (368 lb)   SpO2 98%   BMI 61.24 kg/m²     PHYSICAL EXAMINATION:    GENERAL: Well appearing, in no acute distress, alert and oriented x3.  PSYCH:  Mood and affect appropriate.  SKIN: Skin color, texture, turgor normal, no rashes or lesions.  CV: RRR with palpation of the radial artery.  PULM: No evidence of respiratory difficulty, symmetric chest rise.   NEURO: Cranial nerves grossly intact.    Plan:    Proceed with procedure as planned    Jose L Frias  01/29/2019    "

## 2019-02-06 ENCOUNTER — TELEPHONE (OUTPATIENT)
Dept: CARDIOLOGY | Facility: CLINIC | Age: 54
End: 2019-02-06

## 2019-02-06 DIAGNOSIS — K21.9 GASTROESOPHAGEAL REFLUX DISEASE, ESOPHAGITIS PRESENCE NOT SPECIFIED: ICD-10-CM

## 2019-02-06 DIAGNOSIS — E66.01 MORBID OBESITY WITH BMI OF 60.0-69.9, ADULT: Primary | ICD-10-CM

## 2019-02-07 ENCOUNTER — CLINICAL SUPPORT (OUTPATIENT)
Dept: CARDIOLOGY | Facility: CLINIC | Age: 54
End: 2019-02-07
Attending: PHYSICIAN ASSISTANT
Payer: MEDICARE

## 2019-02-07 VITALS — HEIGHT: 65 IN | WEIGHT: 293 LBS | BODY MASS INDEX: 48.82 KG/M2

## 2019-02-07 DIAGNOSIS — Z01.810 ENCOUNTER FOR PREPROCEDURAL CARDIOVASCULAR EXAMINATION: ICD-10-CM

## 2019-02-07 DIAGNOSIS — E66.01 MORBID OBESITY WITH BMI OF 60.0-69.9, ADULT: ICD-10-CM

## 2019-02-07 LAB
CV STRESS BASE HR: 77 BPM
DIASTOLIC BLOOD PRESSURE: 88 MMHG
END DIASTOLIC INDEX-HIGH: 170 ML/M2
END SYSTOLIC INDEX-HIGH: 70 ML/M2
NUC REST DIASTOLIC VOLUME INDEX: 85
NUC REST EJECTION FRACTION: 77
NUC REST SYSTOLIC VOLUME INDEX: 19
NUC STRESS DIASTOLIC VOLUME INDEX: 86
NUC STRESS EJECTION FRACTION: 80 %
NUC STRESS SYSTOLIC VOLUME INDEX: 17
OHS CV CPX 85 PERCENT MAX PREDICTED HEART RATE MALE: 135
OHS CV CPX MAX PREDICTED HEART RATE: 158
OHS CV CPX PATIENT IS FEMALE: 1
OHS CV CPX PATIENT IS MALE: 0
OHS CV CPX PEAK DIASTOLIC BLOOD PRESSURE: 85 MMHG
OHS CV CPX PEAK HEAR RATE: 85 BPM
OHS CV CPX PEAK RATE PRESSURE PRODUCT: NORMAL
OHS CV CPX PEAK SYSTOLIC BLOOD PRESSURE: 143 MMHG
OHS CV CPX PERCENT MAX PREDICTED HEART RATE ACHIEVED: 54
OHS CV CPX RATE PRESSURE PRODUCT PRESENTING: NORMAL
RETIRED EF AND QEF - SEE NOTES: 51 %
SYSTOLIC BLOOD PRESSURE: 135 MMHG

## 2019-02-07 PROCEDURE — 78492 PET STRESS (CUPID ONLY): ICD-10-PCS | Mod: 26,S$PBB,, | Performed by: INTERNAL MEDICINE

## 2019-02-07 PROCEDURE — A9555 RB82 RUBIDIUM: HCPCS | Mod: PBBFAC

## 2019-02-07 PROCEDURE — 99211 OFF/OP EST MAY X REQ PHY/QHP: CPT | Mod: PBBFAC,25

## 2019-02-07 PROCEDURE — 99999 PR PBB SHADOW E&M-EST. PATIENT-LVL I: ICD-10-PCS | Mod: PBBFAC,,,

## 2019-02-07 PROCEDURE — 99999 PR PBB SHADOW E&M-EST. PATIENT-LVL I: CPT | Mod: PBBFAC,,,

## 2019-02-07 PROCEDURE — 78492 MYOCRD IMG PET MLT RST&STRS: CPT | Mod: PBBFAC | Performed by: INTERNAL MEDICINE

## 2019-02-07 RX ORDER — AMINOPHYLLINE 25 MG/ML
75 INJECTION, SOLUTION INTRAVENOUS
Status: COMPLETED | OUTPATIENT
Start: 2019-02-07 | End: 2019-02-07

## 2019-02-07 RX ORDER — DIPYRIDAMOLE 5 MG/ML
60 INJECTION INTRAVENOUS
Status: COMPLETED | OUTPATIENT
Start: 2019-02-07 | End: 2019-02-07

## 2019-02-07 RX ADMIN — DIPYRIDAMOLE 60 MG: 5 INJECTION INTRAVENOUS at 11:02

## 2019-02-07 RX ADMIN — AMINOPHYLLINE 75 MG: 25 INJECTION, SOLUTION INTRAVENOUS at 11:02

## 2019-02-11 ENCOUNTER — OFFICE VISIT (OUTPATIENT)
Dept: OPTOMETRY | Facility: CLINIC | Age: 54
End: 2019-02-11
Payer: MEDICARE

## 2019-02-11 ENCOUNTER — CLINICAL SUPPORT (OUTPATIENT)
Dept: BARIATRICS | Facility: CLINIC | Age: 54
End: 2019-02-11
Payer: MEDICARE

## 2019-02-11 VITALS — WEIGHT: 293 LBS | BODY MASS INDEX: 48.82 KG/M2 | HEIGHT: 65 IN

## 2019-02-11 DIAGNOSIS — H52.13 MYOPIA WITH ASTIGMATISM AND PRESBYOPIA, BILATERAL: ICD-10-CM

## 2019-02-11 DIAGNOSIS — H52.4 MYOPIA WITH ASTIGMATISM AND PRESBYOPIA, BILATERAL: ICD-10-CM

## 2019-02-11 DIAGNOSIS — E11.65 UNCONTROLLED TYPE 2 DIABETES MELLITUS WITH HYPERGLYCEMIA: ICD-10-CM

## 2019-02-11 DIAGNOSIS — E11.9 TYPE 2 DIABETES MELLITUS WITHOUT RETINOPATHY: Primary | ICD-10-CM

## 2019-02-11 DIAGNOSIS — H43.812 POSTERIOR VITREOUS DETACHMENT OF LEFT EYE: ICD-10-CM

## 2019-02-11 DIAGNOSIS — G47.33 OSA ON CPAP: ICD-10-CM

## 2019-02-11 DIAGNOSIS — Z71.3 NUTRITIONAL COUNSELING: ICD-10-CM

## 2019-02-11 DIAGNOSIS — E66.01 MORBID OBESITY WITH BMI OF 60.0-69.9, ADULT: ICD-10-CM

## 2019-02-11 DIAGNOSIS — H52.203 MYOPIA WITH ASTIGMATISM AND PRESBYOPIA, BILATERAL: ICD-10-CM

## 2019-02-11 DIAGNOSIS — H25.13 NUCLEAR SCLEROSIS, BILATERAL: ICD-10-CM

## 2019-02-11 PROCEDURE — 97803 MED NUTRITION INDIV SUBSEQ: CPT | Mod: PBBFAC | Performed by: DIETITIAN, REGISTERED

## 2019-02-11 PROCEDURE — 92014 COMPRE OPH EXAM EST PT 1/>: CPT | Mod: S$PBB,,, | Performed by: OPTOMETRIST

## 2019-02-11 PROCEDURE — 99212 OFFICE O/P EST SF 10 MIN: CPT | Mod: PBBFAC,27 | Performed by: DIETITIAN, REGISTERED

## 2019-02-11 PROCEDURE — 99999 PR PBB SHADOW E&M-EST. PATIENT-LVL II: CPT | Mod: PBBFAC,,, | Performed by: DIETITIAN, REGISTERED

## 2019-02-11 PROCEDURE — 99499 NO LOS: ICD-10-PCS | Mod: S$PBB,,, | Performed by: DIETITIAN, REGISTERED

## 2019-02-11 PROCEDURE — 99999 PR PBB SHADOW E&M-EST. PATIENT-LVL II: CPT | Mod: PBBFAC,,, | Performed by: OPTOMETRIST

## 2019-02-11 PROCEDURE — 92015 PR REFRACTION: ICD-10-PCS | Mod: ,,, | Performed by: OPTOMETRIST

## 2019-02-11 PROCEDURE — 99999 PR PBB SHADOW E&M-EST. PATIENT-LVL II: ICD-10-PCS | Mod: PBBFAC,,, | Performed by: DIETITIAN, REGISTERED

## 2019-02-11 PROCEDURE — 99212 OFFICE O/P EST SF 10 MIN: CPT | Mod: PBBFAC,PO | Performed by: OPTOMETRIST

## 2019-02-11 PROCEDURE — 92014 PR EYE EXAM, EST PATIENT,COMPREHESV: ICD-10-PCS | Mod: S$PBB,,, | Performed by: OPTOMETRIST

## 2019-02-11 PROCEDURE — 99499 UNLISTED E&M SERVICE: CPT | Mod: S$PBB,,, | Performed by: DIETITIAN, REGISTERED

## 2019-02-11 PROCEDURE — 92015 DETERMINE REFRACTIVE STATE: CPT | Mod: ,,, | Performed by: OPTOMETRIST

## 2019-02-11 PROCEDURE — 99999 PR PBB SHADOW E&M-EST. PATIENT-LVL II: ICD-10-PCS | Mod: PBBFAC,,, | Performed by: OPTOMETRIST

## 2019-02-11 NOTE — PROGRESS NOTES
NUTRITION NOTE    Referring Physician: Heriberto Clements M.D.  Reason for MNT Referral: MSD pending Gastric Bypass    Patient presents for 2nd visit for MSD with -8lbs weight loss over the past month; total weight loss by making numerous dietary and lifestyle changes. Pt visited sister in [place] for 1 week to help after surgery and the has been here helping her brother after dx of AML.    CLINICAL DATA:  53 y.o. female.    Current Weight: 360 lbs  Weight Change Since Initial Visit: -8 lbs  Ideal Body Weight: 141 lbs  Body mass index is 60.06 kg/m².    NUTRITIONAL NEEDS:  2270 x 1.2 activity factor = 2724 - 1000 for wt loss = 1724 Calories (using [place] [address] Jeor Equation)   Grams Protein (1.5-1.8 gm/kg IBW)    CURRENT DIET:  Reduced-calorie diet.  Diet Recall: Food records are not present.    Breakfast: grits or eggs, sausage with grits or oatmeal   Lunch: salad with chicken or chicken stew or turkey sandwich or none  Snack: fruit or nuts  Dinner: ramen noodles with chicken or soup or red beans with brown rice or none  Snack: crackers with cheese     Protein supplements: 1 in the past month  Snacking: increased; consuming healthy choices  Vegetables: Likes a variety. Eats daily.  Fruits: Likes a variety. Eats daily.  Beverages: water, diet soda on occasion (1-2/month), sugar-free beverages, coffee without sugar, hot tea  Dining out: Weekly. Mostly restaurants.  Cooking at home: Weekly. Mostly baked, grilled and fried meat, fish, starchy CHO and vegetables.    CURRENT EXERCISE:  Current exercise: decreased past few months - was doing swimming and gym x 3-5/wk - planning to restart soon; pt is taking stairs at home  Restrictions to exercise: pain in knees/back    Vitamins / Minerals / Herbs:   MV with iron x 1  Vit D    Food Allergies:   strawberries    Social:  Retired.  Alcohol: None.  Smoking: None.    ASSESSMENT:  Patient demonstrates some willingness to change lifestyle habits as evidenced by weight  loss, better choices when dining out and healthier cooking at home.    Doing fairly well with working on greatest challenges (starchy CHO and emotional eating).    Barriers to Education:  none  Stage of Change:  determination and action    NUTRITION DIAGNOSIS:  Obesity related to Excessive calorie intake and Physical inactivity as evidence by BMI.  Status: Improved    PLAN:  Pt is a potential candidate for surgery.    Diet: Adjust diet plan.  Work on expanding variety of vegetables.  Work on gradually cutting back on starchy CHO in the diet.  5-6 meals per day.  Start including protein supplements in the diet plan daily.  Return to clinic.   MV x 2/day (with iron)    Exercise: Increase.    Behavior Modification: Begin to document food & activity logs daily. [employer] jony: [id number]    Weight loss prior to surgery: goal weight per Dr. Clements 348lbs before surgery    Return to clinic in one month.  Needs additional visit(s) with RD.    Communicated nutrition plan with bariatric team.    SESSION TIME:  30 minutes

## 2019-02-11 NOTE — PATIENT INSTRUCTIONS
Adjust diet plan.  Work on expanding variety of vegetables.  Work on gradually cutting back on starchy CHO in the diet.  5-6 meals per day.  Start including protein supplements in the diet plan daily.  Return to clinic.   MV x 2/day (with iron)    Exercise: Increase.    Behavior Modification: Begin to document food & activity logs daily. [employer] jony: [id number]

## 2019-02-11 NOTE — PROGRESS NOTES
WILLIAM SEN 02/2018  Diabetic  yesterday.  Patient states she has had  A   floater OS a few years, seems stable.  Glasses about 1 yr. Old,  Hasn't   noticed any vision changes.  Patient would like updated RX for new   glasses. Not using any drops.  Hemoglobin A1C       Date                     Value               Ref Range             Status                01/23/2019               7.5 (H)             4.0 - 5.6 %           Final                 08/20/2018               10.7 (H)            4.0 - 5.6 %           Final                 05/17/2018               7.6 (H)             4.0 - 5.6 %           Final                Last edited by Jody Saavedra on 2/11/2019  8:35 AM. (History)            Assessment /Plan     For exam results, see Encounter Report.    Type 2 diabetes mellitus without retinopathy - Both Eyes    Nuclear sclerosis, bilateral    Posterior vitreous detachment of left eye    Myopia with astigmatism and presbyopia, bilateral      1. No diabetic retinopathy, no csme. Return in 1 year for dilated eye exam.  2. Educated pt on presence of cataracts and effects on vision. No surgery at this time. Recheck in one year.  3. Monitor condition. Patient to report any changes. RTC 1 year recheck.  4. Spec Rx given. Different lens options discussed with patient. RTC 1 year full exam.

## 2019-02-14 ENCOUNTER — TELEPHONE (OUTPATIENT)
Dept: ENDOSCOPY | Facility: HOSPITAL | Age: 54
End: 2019-02-14

## 2019-02-20 ENCOUNTER — OFFICE VISIT (OUTPATIENT)
Dept: PSYCHIATRY | Facility: CLINIC | Age: 54
End: 2019-02-20
Payer: MEDICARE

## 2019-02-20 ENCOUNTER — DOCUMENTATION ONLY (OUTPATIENT)
Dept: BARIATRICS | Facility: CLINIC | Age: 54
End: 2019-02-20

## 2019-02-20 DIAGNOSIS — E66.01 MORBID OBESITY DUE TO EXCESS CALORIES: ICD-10-CM

## 2019-02-20 DIAGNOSIS — E11.8 TYPE 2 DIABETES MELLITUS WITH COMPLICATION, UNSPECIFIED WHETHER LONG TERM INSULIN USE: ICD-10-CM

## 2019-02-20 DIAGNOSIS — Z01.818 PREOPERATIVE EVALUATION TO RULE OUT SURGICAL CONTRAINDICATION: Primary | ICD-10-CM

## 2019-02-20 PROCEDURE — 96138 PSYCL/NRPSYC TECH 1ST: CPT | Mod: ,,, | Performed by: PSYCHOLOGIST

## 2019-02-20 PROCEDURE — 96130 PR PSYCHOLOGIC TEST EVAL SVCS, 1ST HR: ICD-10-PCS | Mod: ,,, | Performed by: PSYCHOLOGIST

## 2019-02-20 PROCEDURE — 90791 PSYCH DIAGNOSTIC EVALUATION: CPT | Mod: ,,, | Performed by: PSYCHOLOGIST

## 2019-02-20 PROCEDURE — 96138 PR PSYCH/NEUROPSYCH TEST ADMIN/SCORING, BY TECH, 2+ TESTS, 1ST 30 MIN: ICD-10-PCS | Mod: ,,, | Performed by: PSYCHOLOGIST

## 2019-02-20 PROCEDURE — 96138 PSYCL/NRPSYC TECH 1ST: CPT | Mod: PBBFAC | Performed by: PSYCHOLOGIST

## 2019-02-20 PROCEDURE — 96130 PSYCL TST EVAL PHYS/QHP 1ST: CPT | Mod: ,,, | Performed by: PSYCHOLOGIST

## 2019-02-20 PROCEDURE — 96139 PR PSYCH/NEUROPSYCH TEST ADMIN/SCORING, BY TECH, 2+ TESTS, EA ADDTL 30 MIN: ICD-10-PCS | Mod: ,,, | Performed by: PSYCHOLOGIST

## 2019-02-20 PROCEDURE — 96130 PSYCL TST EVAL PHYS/QHP 1ST: CPT | Mod: PBBFAC | Performed by: PSYCHOLOGIST

## 2019-02-20 PROCEDURE — 90791 PSYCH DIAGNOSTIC EVALUATION: CPT | Mod: PBBFAC | Performed by: PSYCHOLOGIST

## 2019-02-20 PROCEDURE — 96131 PR PSYCHOLOGIC TEST EVAL SVCS, EA ADDTL HR: ICD-10-PCS | Mod: ,,, | Performed by: PSYCHOLOGIST

## 2019-02-20 PROCEDURE — 96131 PSYCL TST EVAL PHYS/QHP EA: CPT | Mod: PBBFAC | Performed by: PSYCHOLOGIST

## 2019-02-20 PROCEDURE — 90791 PR PSYCHIATRIC DIAGNOSTIC EVALUATION: ICD-10-PCS | Mod: ,,, | Performed by: PSYCHOLOGIST

## 2019-02-20 PROCEDURE — 96131 PSYCL TST EVAL PHYS/QHP EA: CPT | Mod: ,,, | Performed by: PSYCHOLOGIST

## 2019-02-20 PROCEDURE — 96139 PSYCL/NRPSYC TST TECH EA: CPT | Mod: PBBFAC | Performed by: PSYCHOLOGIST

## 2019-02-20 PROCEDURE — 96139 PSYCL/NRPSYC TST TECH EA: CPT | Mod: ,,, | Performed by: PSYCHOLOGIST
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
## 2019-02-20 NOTE — PSYCH TESTING
[facility] [address] [place]  [address]  [phone]    REPORT OF PSYCHOLOGICAL TESTING    NAME: Alivia Shearer  OC #: [id number]  : 1965    REFERRED BY: Heriberto Clements M.D.    EVALUATED BY:  Dinorah Suárez, Ph.D., Clinical Psychologist  DIMA Houser, Psychometrician    DATES OF EVALUATION: 2019, 2019    EVALUATION PROCEDURES AND TIMES:  Conducted by Psychologist (2 hours):  Integration of patient data, interpretation of standardized test results and clinical data, clinical decision-making, treatment planning and report, and interactive feedback to the patient  CPT Codes:  81235 - 1 hour; 11034 - 1 hour  Conducted by Technician (2 hours, 15 minutes):  Psychological test administration and scoring by technician, two or more tests, any method:  [place] Multiphasic Personality Inventory - 2 - Restructured Form (MMPI-2-RF); Love Depression Inventory - II (BDI-II); [facility] Behavioral Medicine Diagnostic (MBMD)  CPT Codes:  93777 - 30 minutes; 92922 - 30 minutes, 66703 - 30 minutes, 62603 - 30 minutes (3 additional units)    EVALUATION FINDINGS:  Before this evaluation was initiated, the purposes and process of the assessment and the limits of confidentiality were discussed with the patient who expressed understanding of these issues and orally consented to proceed with the evaluation.    Ms. Shearer has struggled with weight since childhood, but has gained her largest amount of weight in the past 15 years since her mother . She believes that since her mother's death, she has been engaging more regularly in emotional eating as a result of grief. Until recently, she was living with her father (age 96) and has been his main caretaker; watching his health slowly decline has led to further grief and emotional eating. Her main food weaknesses are starches (bread, rice, pasta) and fast food. She does not drink soda and does not tend to eat sweets much. Ms. Roy  Thong has tried weight loss methods on her own, most notably Sugar Busters many years ago, when she lost 40 pounds. She also started bariatric surgery workup in 2016, but decided against having the surgery as she was losing weight on her own. She also believes that she would not have been successful with the surgery at that time as she was not adequately focused on her own health and was living in her father's home where the food was unhealthy. Her motivation for seeking surgery now is to improve her health, particularly her Diabetes and her shortness of breath. Additionally, as she got  several months ago, she is motivated by the desire to live a longer life with her spouse, as well as her strong support for Ms. Shearer to get healthier. Her postsurgical goals include: being able to go dancing again, to walk up the steps without shortness of breath, to have less knee and back pain, and to be more independent as she ages.    Ms. Shearer denies a significant psychiatric history. She has recently been experiencing anxiety symptoms due to the failing health of several family members and has gotten on Prozac in the past 1 year. She has also been to a few sessions of counseling for help with coping skills. She denies a history of eating disorder.    At her initial consultation with Ms. Serge Garcia on 01/23/2019, her BMI was 61. Her current medical comorbidities include: Diabetes Type II, Hyperlipidemia, DJD, Sleep Apnea, and Asthma. She has met with Ms. Norah Arthur RD, bariatric dietician and was well-informed regarding the details of the procedure and post-operative eating restrictions. She has a good understanding regarding the risks and benefits of the procedure and appears motivated for change. She was fully cooperative and engaged in the assessment process.    Ms. Shearer produced a valid and interpretable MMPI-2RF protocol; therefore, her test results should be considered an accurate representation of her  current symptoms and problems. Ms. Ferrara profile is in the normal range for all symptom categories, indicating that she does not currently struggle with any severe psychiatric problems, symptoms, or adjustment issues.    The MBMD indicated that Ms. Shearer is not reporting any significant psychiatric problems at this time. In general, Ms. Shearer is reluctant to disclose her emotions, seeking to maintain control at all costs. Because she thinks of herself as strong and capable, being a patient will be very difficult for her, and she may be sarcastic or irritable with treatment providers. Ms. Shearer identified her spiritual sharan and her social support as her coping mechanisms for stress. There is little reason to believe that her psychological characteristics will lead to a complicated recovery from surgery. Test results indicate that, compared to the average bariatric surgery patient, there is an average chance that she will engage in appropriate behavioral changes to support optimal surgery results, and there is a good chance that surgery will improve her quality of life. Ms. Shearer is likely to benefit from a bariatric support group and a nutritional instruction plan after surgery.     DIAGNOSTIC IMPRESSIONS:  Z68.44    Body Mass Index of 61  Z01.818  Pre-operative Exam  E66.01  Morbid  Obesity    SUMMARY AND RECOMMENDATIONS:  Ms. Shearer has a long history of weight problems and is pursuing bariatric surgery at this time in an effort to improve her health and quality of life. Results of personality testing should be considered valid, and they indicate that she is not currently suffering from any psychiatric symptoms or problems that would contraindicate surgery.   .

## 2019-02-21 DIAGNOSIS — Z96.651 STATUS POST TOTAL RIGHT KNEE REPLACEMENT: ICD-10-CM

## 2019-02-21 DIAGNOSIS — G89.29 BILATERAL CHRONIC KNEE PAIN: ICD-10-CM

## 2019-02-21 DIAGNOSIS — Z79.891 ENCOUNTER FOR LONG-TERM OPIATE ANALGESIC USE: ICD-10-CM

## 2019-02-21 DIAGNOSIS — M17.0 PRIMARY OSTEOARTHRITIS OF BOTH KNEES: ICD-10-CM

## 2019-02-21 DIAGNOSIS — M25.562 BILATERAL CHRONIC KNEE PAIN: ICD-10-CM

## 2019-02-21 DIAGNOSIS — M51.36 DDD (DEGENERATIVE DISC DISEASE), LUMBAR: ICD-10-CM

## 2019-02-21 DIAGNOSIS — M47.816 FACET ARTHRITIS OF LUMBAR REGION: ICD-10-CM

## 2019-02-21 DIAGNOSIS — M25.561 BILATERAL CHRONIC KNEE PAIN: ICD-10-CM

## 2019-02-21 DIAGNOSIS — M47.816 SPONDYLOSIS OF LUMBAR REGION WITHOUT MYELOPATHY OR RADICULOPATHY: ICD-10-CM

## 2019-02-21 DIAGNOSIS — G89.4 CHRONIC PAIN DISORDER: ICD-10-CM

## 2019-02-21 NOTE — TELEPHONE ENCOUNTER
----- Message from Ibeth Finney sent at 2/21/2019  1:09 PM CST -----  Contact: DONTAE MOON [[id number]]      Can the clinic reply in [facility]: no    Please refill the medication(s) listed below. Please call the patient when the prescription(s) is ready for  at this phone number  [phone] (home)     Medication #1 oxyCODONE-acetaminophen (PERCOCET)  mg per tablet       Preferred Pharmacy:   [facility] - [address] [address]  Phone: [phone] Fax: [phone]

## 2019-02-22 ENCOUNTER — TELEPHONE (OUTPATIENT)
Dept: PAIN MEDICINE | Facility: CLINIC | Age: 54
End: 2019-02-22

## 2019-02-22 RX ORDER — OXYCODONE AND ACETAMINOPHEN 10; 325 MG/1; MG/1
1 TABLET ORAL EVERY 8 HOURS PRN
Qty: 90 TABLET | Refills: 0 | Status: SHIPPED | OUTPATIENT
Start: 2019-02-22 | End: 2019-03-19 | Stop reason: SDUPTHER

## 2019-02-22 NOTE — TELEPHONE ENCOUNTER
Spoke with patient regarding pain medication refill request, advised that medication waiting for physician approval

## 2019-02-22 NOTE — TELEPHONE ENCOUNTER
----- Message from Nathaly Roca sent at 2/22/2019  9:10 AM CST -----  Contact: Pt  Name of Who is Calling:DONTAE MOON [[id number]]    What is the request in detail: Patient would like a call back regarding medication refill, patient states it needs to be started today Please contact to further discuss and advise     Can the clinic reply by [facility]: No    [facility]: [phone]

## 2019-02-26 ENCOUNTER — OFFICE VISIT (OUTPATIENT)
Dept: PAIN MEDICINE | Facility: CLINIC | Age: 54
End: 2019-02-26
Payer: MEDICARE

## 2019-02-26 VITALS
TEMPERATURE: 97 F | DIASTOLIC BLOOD PRESSURE: 87 MMHG | HEIGHT: 65 IN | BODY MASS INDEX: 48.82 KG/M2 | WEIGHT: 293 LBS | SYSTOLIC BLOOD PRESSURE: 135 MMHG | HEART RATE: 84 BPM

## 2019-02-26 DIAGNOSIS — M47.819 OSTEOARTHRITIS OF SPINE WITHOUT MYELOPATHY OR RADICULOPATHY, UNSPECIFIED SPINAL REGION: ICD-10-CM

## 2019-02-26 DIAGNOSIS — M47.816 FACET ARTHRITIS OF LUMBAR REGION: ICD-10-CM

## 2019-02-26 DIAGNOSIS — M25.562 PAIN IN BOTH KNEES, UNSPECIFIED CHRONICITY: Primary | ICD-10-CM

## 2019-02-26 DIAGNOSIS — M47.816 LUMBAR SPONDYLOSIS: Primary | ICD-10-CM

## 2019-02-26 DIAGNOSIS — M25.561 PAIN IN BOTH KNEES, UNSPECIFIED CHRONICITY: Primary | ICD-10-CM

## 2019-02-26 DIAGNOSIS — M51.36 DDD (DEGENERATIVE DISC DISEASE), LUMBAR: ICD-10-CM

## 2019-02-26 DIAGNOSIS — Z79.891 ENCOUNTER FOR LONG-TERM OPIATE ANALGESIC USE: ICD-10-CM

## 2019-02-26 PROCEDURE — 80307 DRUG TEST PRSMV CHEM ANLYZR: CPT

## 2019-02-26 PROCEDURE — 99999 PR PBB SHADOW E&M-EST. PATIENT-LVL III: ICD-10-PCS | Mod: PBBFAC,,, | Performed by: NURSE PRACTITIONER

## 2019-02-26 PROCEDURE — 99214 OFFICE O/P EST MOD 30 MIN: CPT | Mod: S$PBB,,, | Performed by: NURSE PRACTITIONER

## 2019-02-26 PROCEDURE — 99213 OFFICE O/P EST LOW 20 MIN: CPT | Mod: PBBFAC | Performed by: NURSE PRACTITIONER

## 2019-02-26 PROCEDURE — 99999 PR PBB SHADOW E&M-EST. PATIENT-LVL III: CPT | Mod: PBBFAC,,, | Performed by: NURSE PRACTITIONER

## 2019-02-26 PROCEDURE — 99214 PR OFFICE/OUTPT VISIT, EST, LEVL IV, 30-39 MIN: ICD-10-PCS | Mod: S$PBB,,, | Performed by: NURSE PRACTITIONER
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
## 2019-02-26 NOTE — PROGRESS NOTES
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    Relevant Surgeries: right knee surgery x3 (arthroscopic, then replacement and subsequent revision surgery), s/p left total knee arthroplasty    Relevant Imaging:  Xray Lumbar spine 09/21/2015  Lumbar spine radiograph    Comparison: None    Results: AP, lateral neutral, lateral flexion , lateral extension, bilateral oblique and spot views. The alignment of the lumbar spine demonstrates a mild levoscoliosis . 11-mm anterior listhesis of L4 relative to L5 with no translational abnormalities  seen on flexion and extension views. The vertebral body heights are well-maintained , mild disk space narrowing L4-L5 and L5-S1. Mild anterior and marginal osteophyte formation seen throughout the lumbar spine . The oblique views demonstrate no   definite spondylolysis. There is facet joint osseous hypertrophy noted at L3-L4 and L4-L5.      Impression         The Significant spondylosis of the lumbar spine with grade 1 anterior listhesis L4 relative to L5.  Facet joint osseous hypertrophy L3-L4 and L4-5.      Electronically signed by: BLESSING ROE MD  Date: 09/21/15  Time: 09:56          X-ray knee bilateral 8/26/2015:  Standing AP knees, lateral view of both knees and sunrise view of both patella. Study compared to May 2015. Postop change of bilateral knee replacement. The prosthetic components are in  satisfactory position. Erosive changes involving the patella   again evident bilaterally.    Impression no significant change.      Xray Bilateral Knee 05/25/2015:  There are bilateral total knee arthroplasties with posterior resurfacing of the patella. As observed on 12/17/2014 there is a fracture of the left patella in the parasagittal plane.    Heterotopic bone is evident about each knee.    I detect no dislocation, unusual radiopaque retained foreign body, lytic or blastic lesion, or chondrocalcinosis.    Cervical MRI 4/24/2018:    Narrative     EXAMINATION:  MRI CERVICAL SPINE WITHOUT CONTRAST    CLINICAL HISTORY:  Neck pain, first study;.  Cervicalgia.    TECHNIQUE:  Multiplanar, multisequence MR images of the cervical spine were acquired without the administration of contrast.    COMPARISON:  None.    FINDINGS:  There is reversal of the normal cervical lordosis which could be related to patient positioning versus muscle spasm.    Cervical vertebral body heights are well maintained without evidence of acute fracture or dislocation.  Marrow signal is unremarkable.    Spinal canal contents are unremarkable.  No abnormal masses or collections.    Individual levels as detailed below:    C2-3: No significant spinal canal stenosis or neuroforaminal narrowing.    C3-4: Facet arthropathy.  No significant spinal canal stenosis or neuroforaminal narrowing.    C4-5: Facet arthropathy.  Small broad-based disc osteophyte complex.  Mild right neuroforaminal narrowing.  Mild spinal canal stenosis.    C5-6: Facet arthropathy.  Uncovertebral joint spurring.  Broad-based posterior disc osteophyte complex.  Mild right neuroforaminal narrowing.  Mild spinal canal stenosis.    C6-7: Facet arthropathy.  No significant spinal canal stenosis or neuroforaminal narrowing.    C7-T1: No significant spinal canal stenosis or neuroforaminal narrowing.    Paraspinal muscles are unremarkable.  Soft tissues are intact.   Impression       Mild  multilevel cervical spondylosis with mild spinal canal stenosis and mild right neuroforaminal narrowing at C4-5 and C5-6.       Lab Results   Component Value Date    HGBA1C 7.5 (H) 01/23/2019     Lab Results   Component Value Date    CHOL 161 01/23/2019    CHOL 152 05/17/2018    CHOL 191 05/09/2017     Lab Results   Component Value Date    HDL 51 01/23/2019    HDL 50 05/17/2018    HDL 48 05/09/2017     Lab Results   Component Value Date    LDLCALC 94.2 01/23/2019    LDLCALC 91.0 05/17/2018    LDLCALC 129.0 05/09/2017     Lab Results   Component Value Date    TRIG 79 01/23/2019    TRIG 55 05/17/2018    TRIG 70 05/09/2017     Lab Results   Component Value Date    CHOLHDL 31.7 01/23/2019    CHOLHDL 32.9 05/17/2018    CHOLHDL 25.1 05/09/2017         Past Medical History:   Diagnosis Date    Allergy     Anemia     Asthma     Depression 1/28/2019    Diabetes mellitus type II     DJD (degenerative joint disease) of knee 6/19/2014    Facet arthritis of lumbar region 12/17/2015    Facet syndrome 12/17/2015    GERD (gastroesophageal reflux disease)     Heartburn     Hyperlipidemia     Hypertension     Morbid obesity     Neuromuscular disorder     Sacroiliitis 6/13/2018    Sleep apnea      Past Surgical History:   Procedure Laterality Date    BLOCK, NERVE, INTERCOSTAL, SINGLE Left 4/2/2014    Performed by [facility] Wagner [facility] at [facility] MGT    BLOCK-NERVE-MEDIAL BRANCH-LUMBAR Bilateral 3/1/2016    Performed by [facility] at [facility] MGT    BLOCK-NERVE-MEDIAL BRANCH-LUMBAR Bilateral 2/16/2016    [facility] at [facility] MGT    CARPAL TUNNEL RELEASE      CARPAL TUNNEL RELEASE  1980s    left    CARPAL TUNNEL RELEASE  2012    right    ESOPHAGOGASTRODUODENOSCOPY (EGD) N/A 12/19/2016    Performed by Gulshan Stroud [facility] at [facility] ENDO (2ND FLR)    JOINT REPLACEMENT Bilateral     with 2 revisions on rt    KNEE SURGERY  3/2010    orthroscope    KNEE SURGERY  6-19-14    left  TKR    Radiofrequency Ablation LEFT L2,3,4,5 RFA Left 12/27/2018    [facility] at [facility]    Radiofrequency Ablation RIGHT LUMBAR L2,3,4,5 RFA Right 1/29/2019    Per[facility] by [facility] at [facility]    RADIOFREQUENCY ABLATION, NERVE, MEDIAL BRANCH, LUMBAR, 1 LEVEL Right 7/10/2018    Performed by [facility] Wagner [facility] at [facility]    RADIOFREQUENCY ABLATION, NERVE, MEDIAL BRANCH, LUMBAR, 1 LEVEL Left 6/26/2018    Performed by [facility] at [facility]    RADIOFREQUENCY THERMOCOAGULATION (RFTC)-NERVE-MEDIAN BRANCH-LUMBAR Right 1/9/2018    Performed by [facility] Wagner [facility] at [facility]    RADIOFREQUENCY THERMOCOAGULATION (RFTC)-NERVE-MEDIAN BRANCH-LUMBAR Left 12/26/2017    Performed by [facility] Wagner [facility] at [facility]    RADIOFREQUENCY THERMOCOAGULATION (RFTC)-NERVE-MEDIAN BRANCH-LUMBAR Right 10/19/2016    Performed by Lina Wagner MD at [facility]    RADIOFREQUENCY THERMOCOAGULATION (RFTC)-NERVE-MEDIAN BRANCH-LUMBAR Left 10/5/2016    Performed by Lina Wagner [facility] at [facility]    RADIOFREQUENCY THERMOCOAGULATION (RFTC)-NERVE-MEDIAN BRANCH-LUMBAR Right 4/20/2016    Performed by [facility] at [facility]    RADIOFREQUENCY THERMOCOAGULATION (RFTC)-NERVE-MEDIAN BRANCH-LUMBAR Left 4/6/2016    Performed by [facility] Wagner MD at [facility]    RADIOFREQUENCY THERMOCOAGULATION (RFTC)-NERVE-MEDIAN BRANCH-LUMBAR/COOLED Right 5/9/2017    Performed by [facility] Wagner [facility] at [facility]    RADIOFREQUENCY THERMOCOAGULATION (RFTC)-NERVE-MEDIAN BRANCH-LUMBAR/COOLED Left 4/26/2017    Performed by [facility] Wagner [facility] at [facility]    RELEASE, TRIGGER FINGER Right 2/4/2013    Performed [facility] at [facility] FLR    REPLACEMENT-KNEE-TOTAL Left 6/19/2014    Performed by Theodore Swan MD at [facility] OR 2ND FLR    Revision Carpal Tunnel-Right Right 12/14/2015    Per[facility] by [facility]ma EATON  [facility] at [facility]    REVISION-ARTHROPLASTY-KNEE-TOTAL Right 11/4/2014    [facility] by [facility] at [facility] FLR     Family History   Problem Relation Age of Onset    Diabetes Mother     Cataracts Mother     Diabetes Father     Cataracts Father     Coronary artery disease Brother     Diabetes Brother     Hypertension Sister     Diabetes Sister     No Known Problems Sister     Cancer Sister         lymphoma     Diabetes Brother     Hypotension Brother     Kidney failure Brother     Hypotension Brother     Amblyopia Neg Hx     Blindness Neg Hx     Glaucoma Neg Hx     Macular degeneration Neg Hx     Retinal detachment Neg Hx     Strabismus Neg Hx     Stroke Neg Hx     Thyroid disease Neg Hx      Social History     Socioeconomic History    Marital status:      Spouse name: Not on file    Number of children: Not on file    Years of education: Not on file    Highest education level: Not on file   Social Needs    Financial resource strain: Not on file    Food insecurity - worry: Not on file    Food insecurity - inability: Not on file    Transportation needs - medical: Not on file    Transportation needs - non-medical: Not on file   Occupational History    Not on file   Tobacco Use    Smoking status: Never Smoker    Smokeless tobacco: Never Used   Substance and Sexual Activity    Alcohol use: Yes     Comment: occasionally     Drug use: No    Sexual activity: Yes     Partners: Female     Birth control/protection: None   Other Topics Concern    Not on file   Social History Narrative    Disabled. The patient is the youngest of 6 siblings. Single. Lives with single-sex partner.                  Review of patient's allergies indicates:  No Known Allergies    Medication List with Changes/Refills   Current Medications    ASPIRIN (ECOTRIN) 81 MG EC TABLET    Take 1 tablet by mouth every morning. prevents heart attacks and strokes    ATORVASTATIN (LIPITOR) 20 MG  "TABLET    TAKE 1 TABLET BY MOUTH DAILY    BLOOD SUGAR DIAGNOSTIC STRP    Freestyle light strips and lancets    BLOOD SUGAR DIAGNOSTIC STRP    Check glucose four times daily Strips and lancets covered by insurance E11.9 - One touch    BLOOD-GLUCOSE METER KIT    Check glucose four times daily E11.9 meter covered by insurance One Touch    CELECOXIB (CELEBREX) 200 MG CAPSULE    TAKE 1 CAPSULE BY MOUTH DAILY    FLUCONAZOLE (DIFLUCAN) 150 MG TAB    TAKE ONE TABLET BY MOUTH ONCE DAILY    FLUOXETINE 20 MG TABLET    Take 1 tablet (20 mg total) by mouth once daily.    FLUTICASONE (FLONASE) 50 MCG/ACTUATION NASAL SPRAY    1 spray by Each Nare route 2 (two) times daily as needed for Rhinitis.    INSULIN DETEMIR U-100 (LEVEMIR FLEXPEN) 100 UNIT/ML (3 ML) SUBQ INPN PEN    Inject 40 Units into the skin every evening. Inject 40 units every evening    INSULIN LISPRO (HUMALOG KWIKPEN INSULIN) 100 UNIT/ML PEN    INJECT 11 UNITS SUBCUTANEOUSLY BEFORE MEAL(S) PLUS  SLIDING  SCALE max 63 Units per day    LISINOPRIL (PRINIVIL,ZESTRIL) 2.5 MG TABLET    TAKE 1 TABLET BY MOUTH DAILY FOR PROTEINURIA    METFORMIN (GLUCOPHAGE-XR) 500 MG 24 HR TABLET    TAKE 2 TABLETS BY MOUTH TWICE DAILY    MULTIVIT-IRON-MIN-FOLIC ACID 3,500-18-0.4 UNIT-MG-MG ORAL CHEW    Take 1 tablet by mouth once daily.    OMEPRAZOLE (PRILOSEC) 20 MG CAPSULE    TAKE 1 CAPSULE BY MOUTH EVERY MORNING.    OXYCODONE-ACETAMINOPHEN (PERCOCET)  MG PER TABLET    Take 1 tablet by mouth every 8 (eight) hours as needed for Pain.    PEN NEEDLE, DIABETIC 33 GAUGE X 5/32" NDLE    1 application by Misc.(Non-Drug; Combo Route) route 4 (four) times daily with meals and nightly.    VENTOLIN HFA 90 MCG/ACTUATION INHALER    INHALE TWO PUFFS INTO LUNGS EVERY 4 TO 6 HOURS AS NEEDED FOR SHORTNESS OF BREATH AND FOR WHEEZING    VITAMIN D 185 MG TAB    Take 5,000 mg by mouth once daily.         REVIEW OF SYSTEMS:    GENERAL:  Patient is attempting to lose weight.  RESPIRATORY:  Negative for " "cough, wheezing or shortness of breath, patient denies any recent URI.  CARDIOVASCULAR:  Negative for chest pain, leg swelling or palpitations.  GI:  Negative for abdominal discomfort, blood in stools or black stools or change in bowel habits, occasional constipation.  MUSCULOSKELETAL:  See HPI.  SKIN:  Negative for lesions, rash, and itching.  PSYCH:  No mood disorder or recent psychosocial stressors.  Patient's sleep is disturbed secondary to pain (patient reports also that she has insomnia).  HEMATOLOGY/LYMPHOLOGY:  Negative for prolonged bleeding, bruising easily or swollen nodes.  81 mg aspirin  ENDO: Patient has a history of diabetes  NEURO:   No history of headaches, syncope, paralysis, seizures or tremors.  All other reviewed and negative other than HPI.    OBJECTIVE:    /87   Pulse 84   Temp 97.4 °F (36.3 °C)   Ht 5' 5" (1.651 m)   Wt (!) 163.5 kg (360 lb 7.2 oz)   BMI 59.98 kg/m²     PHYSICAL EXAMINATION:    GENERAL: Well appearing, in no acute distress, alert and oriented x3.   PSYCH:  Mood and affect appropriate.  SKIN: Skin color, texture, turgor normal, no rashes or lesions.  HEAD/FACE:  Normocephalic, atraumatic.   CV: RRR with palpation of the radial artery.  PULM: No evidence of respiratory difficulty, symmetric chest rise.  BACK: Negative SLR bilaterally. There is mild pain to palpation over the lumbar facet joints and paraspinals bilaterally.  Limited ROM with pain on flexion.  Positive facet loading bilaterally.  EXTREMITIES: Pain with palpation to bilateral SI joints.  JENNA is negative bilaterally. Well-healed midline scars to bilateral knees.  Painful extension and flexion of bilateral knees. Medial and lateral joint line tenderness to bilateral knees.  MUSCULOSKELETAL: Bilateral upper and lower extremity strength is normal and symmetric.  No atrophy or tone abnormalities are noted.  NEURO: Bilateral lower extremity coordination and muscle stretch reflexes are physiologic and " symmetric.  Plantar response are downgoing. No clonus.  No loss of sensation is noted.  GAIT: Antalgic.      Assessment:       Encounter Diagnoses   Name Primary?    Lumbar spondylosis Yes    Osteoarthritis of spine without myelopathy or radiculopathy, unspecified spinal region     Facet arthritis of lumbar region     DDD (degenerative disc disease), lumbar          Plan:       - Previous imaging was reviewed and discussed with the patient today.     - She is s/p L2,3,4,5 RFAs with benefit.  Continue to monitor progress.    - Continue oxycodone-acetaminophen 10/325 mg one tablet every 8 hours PRN pain, #90, 0 refills.  She does not need a refill but can call for 2 additional refills.    - The patient is here today for a refill of current pain medications and they believe these provide effective pain control and improvements in quality of life.  The patient notes no serious side effects, and feels the benefits outweigh the risks.  The patient was reminded of the pain contract that they signed previously as well as the risks and benefits of the medication including possible death.  The updated [place] Board  Pharmacy prescription monitoring program was reviewed, and the patient has been found to be compliant with current treatment plan.     - UDS from 8/1/2018 reviewed and consistent.  Repeat today.    - We discussed the important of diet and exercise.  She is planning on bariatric surgery in April.    - Continue topical pain cream PRN up to 5x/day.    - RTC in 10 weeks or sooner if needed.    - Dr. Wganer was consulted on the patient and agrees with this plan.      The above plan and management options were discussed at length with patient. Patient is in agreement with the above and verbalized understanding.     Virginia Sanchez, EMILY  02/26/2019

## 2019-03-03 LAB

## 2019-03-06 ENCOUNTER — HOSPITAL ENCOUNTER (OUTPATIENT)
Dept: RADIOLOGY | Facility: HOSPITAL | Age: 54
Discharge: HOME | End: 2019-03-06
Attending: ORTHOPAEDIC SURGERY
Payer: MEDICARE

## 2019-03-06 ENCOUNTER — OFFICE VISIT (OUTPATIENT)
Dept: ORTHOPEDICS | Facility: CLINIC | Age: 54
End: 2019-03-06
Payer: MEDICARE

## 2019-03-06 VITALS — BODY MASS INDEX: 48.82 KG/M2 | HEIGHT: 65 IN | WEIGHT: 293 LBS

## 2019-03-06 DIAGNOSIS — M25.561 PAIN IN BOTH KNEES, UNSPECIFIED CHRONICITY: ICD-10-CM

## 2019-03-06 DIAGNOSIS — G89.29 CHRONIC PAIN OF BOTH KNEES: Primary | ICD-10-CM

## 2019-03-06 DIAGNOSIS — M25.561 CHRONIC PAIN OF BOTH KNEES: Primary | ICD-10-CM

## 2019-03-06 DIAGNOSIS — E66.01 MORBID OBESITY WITH BMI OF 60.0-69.9, ADULT: ICD-10-CM

## 2019-03-06 DIAGNOSIS — M25.562 PAIN IN BOTH KNEES, UNSPECIFIED CHRONICITY: ICD-10-CM

## 2019-03-06 DIAGNOSIS — M25.562 CHRONIC PAIN OF BOTH KNEES: Primary | ICD-10-CM

## 2019-03-06 PROCEDURE — 73562 X-RAY EXAM OF KNEE 3: CPT | Mod: 26,50,, | Performed by: RADIOLOGY

## 2019-03-06 PROCEDURE — 73562 XR KNEE ORTHO BILAT: ICD-10-PCS | Mod: 26,50,, | Performed by: RADIOLOGY

## 2019-03-06 PROCEDURE — 99213 OFFICE O/P EST LOW 20 MIN: CPT | Mod: S$PBB,,, | Performed by: ORTHOPAEDIC SURGERY

## 2019-03-06 PROCEDURE — 99213 OFFICE O/P EST LOW 20 MIN: CPT | Mod: PBBFAC,25 | Performed by: ORTHOPAEDIC SURGERY

## 2019-03-06 PROCEDURE — 73562 X-RAY EXAM OF KNEE 3: CPT | Mod: TC,50

## 2019-03-06 PROCEDURE — 99213 PR OFFICE/OUTPT VISIT, EST, LEVL III, 20-29 MIN: ICD-10-PCS | Mod: S$PBB,,, | Performed by: ORTHOPAEDIC SURGERY

## 2019-03-06 PROCEDURE — 99999 PR PBB SHADOW E&M-EST. PATIENT-LVL III: ICD-10-PCS | Mod: PBBFAC,,, | Performed by: ORTHOPAEDIC SURGERY

## 2019-03-06 PROCEDURE — 99999 PR PBB SHADOW E&M-EST. PATIENT-LVL III: CPT | Mod: PBBFAC,,, | Performed by: ORTHOPAEDIC SURGERY

## 2019-03-06 RX ORDER — DICLOFENAC SODIUM 10 MG/G
2 GEL TOPICAL DAILY
Qty: 1 TUBE | Refills: 3 | Status: SHIPPED | OUTPATIENT
Start: 2019-03-06 | End: 2019-08-13

## 2019-03-06 NOTE — PROGRESS NOTES
"Subjective:      Patient ID: Alivia Thomas is a 53 y.o. female.    Chief Complaint: Pain of the Left Knee and Pain of the Right Knee    HPI  Alivia Thomas has bilateral knee pain.  The pain is unchanged. The pain is located in the anterior aspect of the knee.  There  is not radiation.   There is not associated stiffness.   There is not catching and locking. The pain is described as achy. The pain is aggravated by weightbearing.  It is alleviated by rest.  There is associated back pain.  Her history, medications and problem list were reviewed.    Review of Systems   Constitution: Negative for chills, fever and night sweats.   HENT: Negative for hearing loss.    Eyes: Negative for blurred vision and double vision.   Cardiovascular: Negative for chest pain, claudication and leg swelling.   Respiratory: Negative for shortness of breath.    Endocrine: Negative for polydipsia, polyphagia and polyuria.   Hematologic/Lymphatic: Negative for adenopathy and bleeding problem. Does not bruise/bleed easily.   Skin: Negative for poor wound healing.   Musculoskeletal: Positive for back pain and joint pain.   Gastrointestinal: Negative for diarrhea and heartburn.   Genitourinary: Negative for bladder incontinence.   Neurological: Negative for focal weakness, headaches, numbness, paresthesias and sensory change.   Psychiatric/Behavioral: The patient is not nervous/anxious.    Allergic/Immunologic: Negative for persistent infections.         Objective:      Body mass index is 60.02 kg/m².  Vitals:    03/06/19 1008   Weight: (!) 163.6 kg (360 lb 10.8 oz)   Height: 5' 5" (1.651 m)           General    Constitutional: She is oriented to person, place, and time.   obese   HENT:   Head: Normocephalic and atraumatic.   Eyes: EOM are normal.   Cardiovascular: Normal rate and regular rhythm.    Pulmonary/Chest: Effort normal.   Neurological: She is alert and oriented to person, place, and time.   Psychiatric: She has a normal mood " and affect.     General Musculoskeletal Exam   Gait: normal       Right Knee Exam     Inspection   Erythema: absent  Scars: absent  Swelling: absent  Effusion: absent  Deformity: absent  Bruising: absent    Tenderness   The patient is tender to palpation of the patella and patellar tendon.    Range of Motion   Extension: 0   Flexion: 120     Tests   Ligament Examination Lachman: normal (-1 to 2mm)   MCL - Valgus: normal (0 to 2mm)  LCL - Varus: normal  Patella   Passive Patellar Tilt: neutral    Other   Sensation: normal    Left Knee Exam     Inspection   Erythema: absent  Scars: absent  Swelling: absent  Effusion: absent  Deformity: absent  Bruising: absent    Tenderness   The patient tender to palpation of the patella and patellar tendon.    Range of Motion   Extension: 0   Flexion: 120     Tests   Stability Lachman: normal (-1 to 2mm)   MCL - Valgus: normal (0 to 2mm)  LCL - Varus: normal (0 to 2mm)  Patella   Passive Patellar Tilt: neutral    Other   Sensation: normal    Muscle Strength   Right Lower Extremity   Hip Abduction: 5/5   Quadriceps:  5/5   Hamstrin/5   Left Lower Extremity   Hip Abduction: 5/5   Quadriceps:  5/5   Hamstrin/5     Reflexes     Left Side  Quadriceps:  2+    Right Side   Quadriceps:  2+    Vascular Exam     Right Pulses  Dorsalis Pedis:      2+          Left Pulses  Dorsalis Pedis:      2+          Edema  Right Lower Leg: absent  Left Lower Leg: absent    Radiographs taken today were reviewed by me.  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  The prosthesis is well positioned.  There is not evidence of bone loss, osteolysis, or loosening. Patella fragmentation improved on left.               Assessment:       Encounter Diagnoses   Name Primary?    Chronic pain of both knees Yes    Morbid obesity with BMI of 60.0-69.9, adult           Plan:       Alivia was seen today for pain and pain.    Diagnoses and all orders for this visit:    Chronic pain of both  knees    Morbid obesity with BMI of 60.0-69.9, adult    Other orders  -     diclofenac sodium (VOLTAREN) 1 % Gel; Apply 2 g topically once daily.      Se is considering bariatric surgery. F/U in 6 months with bilateral knee xrays

## 2019-03-07 ENCOUNTER — CLINICAL SUPPORT (OUTPATIENT)
Dept: BARIATRICS | Facility: CLINIC | Age: 54
End: 2019-03-07
Payer: MEDICARE

## 2019-03-07 ENCOUNTER — OFFICE VISIT (OUTPATIENT)
Dept: OBSTETRICS AND GYNECOLOGY | Facility: CLINIC | Age: 54
End: 2019-03-07
Payer: MEDICARE

## 2019-03-07 VITALS — BODY MASS INDEX: 48.82 KG/M2 | HEIGHT: 65 IN | WEIGHT: 293 LBS

## 2019-03-07 VITALS — BODY MASS INDEX: 48.82 KG/M2 | HEIGHT: 65 IN | WEIGHT: 293 LBS | RESPIRATION RATE: 18 BRPM

## 2019-03-07 DIAGNOSIS — E11.65 UNCONTROLLED TYPE 2 DIABETES MELLITUS WITH HYPERGLYCEMIA: ICD-10-CM

## 2019-03-07 DIAGNOSIS — G47.33 OSA ON CPAP: ICD-10-CM

## 2019-03-07 DIAGNOSIS — F33.41 RECURRENT MAJOR DEPRESSIVE DISORDER, IN PARTIAL REMISSION: ICD-10-CM

## 2019-03-07 DIAGNOSIS — Z12.31 VISIT FOR SCREENING MAMMOGRAM: ICD-10-CM

## 2019-03-07 DIAGNOSIS — E66.01 MORBID OBESITY: ICD-10-CM

## 2019-03-07 DIAGNOSIS — E78.5 HYPERLIPIDEMIA, UNSPECIFIED HYPERLIPIDEMIA TYPE: ICD-10-CM

## 2019-03-07 DIAGNOSIS — E66.01 MORBID OBESITY WITH BMI OF 60.0-69.9, ADULT: ICD-10-CM

## 2019-03-07 DIAGNOSIS — I10 HYPERTENSION, UNSPECIFIED TYPE: ICD-10-CM

## 2019-03-07 DIAGNOSIS — Z71.3 NUTRITIONAL COUNSELING: ICD-10-CM

## 2019-03-07 DIAGNOSIS — Z01.419 ENCOUNTER FOR GYNECOLOGICAL EXAMINATION WITHOUT ABNORMAL FINDING: Primary | ICD-10-CM

## 2019-03-07 DIAGNOSIS — J45.20 MILD INTERMITTENT ASTHMA WITHOUT COMPLICATION: ICD-10-CM

## 2019-03-07 PROCEDURE — 99999 PR PBB SHADOW E&M-EST. PATIENT-LVL III: ICD-10-PCS | Mod: PBBFAC,,, | Performed by: OBSTETRICS & GYNECOLOGY

## 2019-03-07 PROCEDURE — 99213 OFFICE O/P EST LOW 20 MIN: CPT | Mod: PBBFAC,27 | Performed by: OBSTETRICS & GYNECOLOGY

## 2019-03-07 PROCEDURE — 99999 PR PBB SHADOW E&M-EST. PATIENT-LVL III: CPT | Mod: PBBFAC,,, | Performed by: OBSTETRICS & GYNECOLOGY

## 2019-03-07 PROCEDURE — 99499 NO LOS: ICD-10-PCS | Mod: S$PBB,,, | Performed by: DIETITIAN, REGISTERED

## 2019-03-07 PROCEDURE — 99999 PR PBB SHADOW E&M-EST. PATIENT-LVL III: ICD-10-PCS | Mod: PBBFAC,,, | Performed by: DIETITIAN, REGISTERED

## 2019-03-07 PROCEDURE — 99999 PR PBB SHADOW E&M-EST. PATIENT-LVL III: CPT | Mod: PBBFAC,,, | Performed by: DIETITIAN, REGISTERED

## 2019-03-07 PROCEDURE — 99213 OFFICE O/P EST LOW 20 MIN: CPT | Mod: PBBFAC | Performed by: DIETITIAN, REGISTERED

## 2019-03-07 PROCEDURE — 99499 NO LOS: ICD-10-PCS | Mod: S$PBB,,, | Performed by: OBSTETRICS & GYNECOLOGY

## 2019-03-07 PROCEDURE — 99499 UNLISTED E&M SERVICE: CPT | Mod: S$PBB,,, | Performed by: OBSTETRICS & GYNECOLOGY

## 2019-03-07 PROCEDURE — 99499 UNLISTED E&M SERVICE: CPT | Mod: S$PBB,,, | Performed by: DIETITIAN, REGISTERED

## 2019-03-07 PROCEDURE — 97803 MED NUTRITION INDIV SUBSEQ: CPT | Mod: PBBFAC | Performed by: DIETITIAN, REGISTERED

## 2019-03-07 RX ORDER — FLUOXETINE HYDROCHLORIDE 20 MG/1
CAPSULE ORAL
Refills: 6 | COMMUNITY
Start: 2019-02-19 | End: 2019-05-06

## 2019-03-07 NOTE — PATIENT INSTRUCTIONS
Adjust diet plan.  Work on expanding variety of vegetables.  Continue to cut back on starchy CHO in the diet.  5-6 meals per day.  Start including protein supplements in the diet plan daily.     Exercise: Increase.     Behavior Modification: Begin to document food & activity logs daily. Veoh jony: [id number]

## 2019-03-07 NOTE — PROGRESS NOTES
NUTRITION NOTE     Referring Physician: Heriberto Clements M.D.  Reason for MNT Referral: MSD pending Gastric Bypass     Patient presents for 3rd visit for MSD with +3lbs weight loss over the past month; total weight loss by making numerous dietary and lifestyle changes. Pt reports was off schedule due to vale gras - back on track since yesterday. Pt will try to not consume sweets or carbs.     CLINICAL DATA:  53 y.o. female.     Current Weight: 363 lbs  Weight Change Since Initial Visit: -5 lbs  Ideal Body Weight: 141 lbs  Body mass index is 60.42 kg/m².     NUTRITIONAL NEEDS:  2270 x 1.2 activity factor = 2724 - 1000 for wt loss = 1724 Calories (using [place] [address]. Jeor Equation)   Grams Protein (1.5-1.8 gm/kg IBW)     CURRENT DIET:  Reduced-calorie diet.  Diet Recall: Food records are not present.     Breakfast: none  Lunch: salad with chicken or chicken stew or turkey sandwich or none  Snack: fruit or nuts or none  Dinner: ramen noodles with chicken or soup or red beans with brown rice or none  Snack: crackers with cheese      Protein supplements: 1 in the past month  Snacking: increased; consuming healthy choices  Vegetables: Likes a variety. Eats daily.  Fruits: Likes a variety. Eats daily.  Beverages: water, diet soda (none lately), sugar-free beverages, coffee without sugar, hot tea with crystal light  Dining out: Weekly. Mostly restaurants.  Cooking at home: Weekly. Mostly baked, grilled and fried meat, fish, starchy CHO and vegetables.     CURRENT EXERCISE:  Current exercise: decreased past few months - was doing swimming and gym x 3-5/wk - planning to restart soon  Restrictions to exercise: pain in knees/back     Vitamins / Minerals / Herbs:   MV with iron x 2  Vit D     Food Allergies:   strawberries     Social:  Retired.  Alcohol: None.  Smoking: None.     ASSESSMENT:  Patient demonstrates some willingness to change lifestyle habits as evidenced by weight loss, better choices when dining out and  healthier cooking at home.     Doing fairly well with working on greatest challenges (starchy CHO and emotional eating).     Barriers to Education:  none  Stage of Change:  determination and action     NUTRITION DIAGNOSIS:  Obesity related to Excessive calorie intake and Physical inactivity as evidence by BMI.  Status: Improved     PLAN:  Pt is a potential candidate for surgery.     Diet: Adjust diet plan.  Work on expanding variety of vegetables.  Continue to cut back on starchy CHO in the diet.  5-6 meals per day.  Start including protein supplements in the diet plan daily.  Discussed pt's goal weight and wanted to be seen in 2 weeks.  Return to clinic.      Exercise: Increase.     Behavior Modification: Begin to document food & activity logs daily. [employer] jony: [id number]     Weight loss prior to surgery: goal weight per Dr. Clements 348lbs before surgery     Return to clinic in one month.  Needs additional visit(s) with JIMENEZ.     Communicated nutrition plan with bariatric team.     SESSION TIME:  30 minutes

## 2019-03-12 ENCOUNTER — TELEPHONE (OUTPATIENT)
Dept: INTERNAL MEDICINE | Facility: CLINIC | Age: 54
End: 2019-03-12

## 2019-03-18 ENCOUNTER — TELEPHONE (OUTPATIENT)
Dept: PAIN MEDICINE | Facility: CLINIC | Age: 54
End: 2019-03-18

## 2019-03-18 NOTE — TELEPHONE ENCOUNTER
Patient is requesting a refill for Medication #1oxyCODONE-acetaminophen (PERCOCET)  mg per tablet     Preferred Pharmacy:[facility] - [place] [address]     Last Office Visit:  02/26/2019    Last UDS:  02/26/2019    Last Refill :  02/21/2019    Please advise.  Thanks

## 2019-03-18 NOTE — TELEPHONE ENCOUNTER
----- Message from Nathaly Roca sent at 3/18/2019  3:44 PM CDT -----  Contact: Pt  Can the clinic reply in [facility]: No    Please refill the medication(s) listed below. The patient can be reached at this phone number ([phone]__)once it is called into the pharmacy.    Medication #1oxyCODONE-acetaminophen (PERCOCET)  mg per tablet    Preferred Pharmacy:[facility] [address]

## 2019-03-19 DIAGNOSIS — M25.561 BILATERAL CHRONIC KNEE PAIN: ICD-10-CM

## 2019-03-19 DIAGNOSIS — M47.816 SPONDYLOSIS OF LUMBAR REGION WITHOUT MYELOPATHY OR RADICULOPATHY: ICD-10-CM

## 2019-03-19 DIAGNOSIS — Z96.651 STATUS POST TOTAL RIGHT KNEE REPLACEMENT: ICD-10-CM

## 2019-03-19 DIAGNOSIS — M25.562 CHRONIC PAIN OF BOTH KNEES: ICD-10-CM

## 2019-03-19 DIAGNOSIS — M47.816 FACET ARTHRITIS OF LUMBAR REGION: ICD-10-CM

## 2019-03-19 DIAGNOSIS — M25.561 CHRONIC PAIN OF BOTH KNEES: ICD-10-CM

## 2019-03-19 DIAGNOSIS — M51.36 DDD (DEGENERATIVE DISC DISEASE), LUMBAR: ICD-10-CM

## 2019-03-19 DIAGNOSIS — G89.4 CHRONIC PAIN DISORDER: ICD-10-CM

## 2019-03-19 DIAGNOSIS — M25.562 BILATERAL CHRONIC KNEE PAIN: ICD-10-CM

## 2019-03-19 DIAGNOSIS — Z79.891 ENCOUNTER FOR LONG-TERM OPIATE ANALGESIC USE: ICD-10-CM

## 2019-03-19 DIAGNOSIS — G89.29 BILATERAL CHRONIC KNEE PAIN: ICD-10-CM

## 2019-03-19 DIAGNOSIS — M17.0 PRIMARY OSTEOARTHRITIS OF BOTH KNEES: ICD-10-CM

## 2019-03-19 DIAGNOSIS — G89.29 CHRONIC PAIN OF BOTH KNEES: ICD-10-CM

## 2019-03-19 RX ORDER — OXYCODONE AND ACETAMINOPHEN 10; 325 MG/1; MG/1
1 TABLET ORAL EVERY 8 HOURS PRN
Qty: 90 TABLET | Refills: 0 | Status: SHIPPED | OUTPATIENT
Start: 2019-03-23 | End: 2019-09-12 | Stop reason: SDUPTHER

## 2019-03-21 ENCOUNTER — CLINICAL SUPPORT (OUTPATIENT)
Dept: BARIATRICS | Facility: CLINIC | Age: 54
End: 2019-03-21
Payer: MEDICARE

## 2019-03-21 VITALS — BODY MASS INDEX: 48.82 KG/M2 | WEIGHT: 293 LBS | HEIGHT: 65 IN

## 2019-03-21 DIAGNOSIS — Z71.3 NUTRITIONAL COUNSELING: ICD-10-CM

## 2019-03-21 DIAGNOSIS — G47.33 OSA ON CPAP: ICD-10-CM

## 2019-03-21 DIAGNOSIS — E66.01 MORBID OBESITY WITH BMI OF 50.0-59.9, ADULT: ICD-10-CM

## 2019-03-21 DIAGNOSIS — E11.65 UNCONTROLLED TYPE 2 DIABETES MELLITUS WITH HYPERGLYCEMIA: ICD-10-CM

## 2019-03-21 PROCEDURE — 97803 MED NUTRITION INDIV SUBSEQ: CPT | Mod: PBBFAC,59 | Performed by: DIETITIAN, REGISTERED

## 2019-03-21 PROCEDURE — 99499 NO LOS: ICD-10-PCS | Mod: S$PBB,,, | Performed by: DIETITIAN, REGISTERED

## 2019-03-21 PROCEDURE — 99999 PR PBB SHADOW E&M-EST. PATIENT-LVL II: ICD-10-PCS | Mod: PBBFAC,,, | Performed by: DIETITIAN, REGISTERED

## 2019-03-21 PROCEDURE — 99499 UNLISTED E&M SERVICE: CPT | Mod: S$PBB,,, | Performed by: DIETITIAN, REGISTERED

## 2019-03-21 PROCEDURE — 99212 OFFICE O/P EST SF 10 MIN: CPT | Mod: PBBFAC | Performed by: DIETITIAN, REGISTERED

## 2019-03-21 PROCEDURE — 99999 PR PBB SHADOW E&M-EST. PATIENT-LVL II: CPT | Mod: PBBFAC,,, | Performed by: DIETITIAN, REGISTERED

## 2019-03-21 NOTE — PROGRESS NOTES
NUTRITION NOTE     Referring Physician: Heriberto Clements M.D.  Reason for MNT Referral: MSD pending Gastric Bypass     Patient presents for 3rd visit for MSD with -9lbs weight loss over the past 2 weeks; total weight loss by making numerous dietary and lifestyle changes. Pt reports has been decreasing carbs, small/frequent meals, protein drinks.     CLINICAL DATA:  53 y.o. female.     Current Weight: 354 lbs  Weight Change Since Initial Visit: -14 lbs  Ideal Body Weight: 141 lbs  Body mass index is 59.03 kg/m².     NUTRITIONAL NEEDS:  2270 x 1.2 activity factor = 2724 - 1000 for wt loss = 1724 Calories (using [place] [address] Jeor Equation)   Grams Protein (1.5-1.8 gm/kg IBW)     CURRENT DIET:  Reduced-calorie diet.  Diet Recall: Food records are not present.     Breakfast: protein drinks or fruit with cheese  Lunch: salad with chicken or fruit with cheese  Snack: fruit or nuts or SF popciles  Dinner: sausage with beans (no rice) or hot dogs (no bun) or beets with salad, protein or eggs with sausage  Snack: protein drinks     Protein supplements: 1 per day - premier protein  Snacking: increased; consuming healthy choices  Vegetables: Likes a variety. Eats daily.  Fruits: Likes a variety. Eats daily.  Beverages: water, diet soda (none lately), sugar-free beverages, coffee without sugar, hot tea with crystal light  Dining out: Weekly. Mostly restaurants.  Cooking at home: Weekly. Mostly baked, grilled, fish/chicken/sausage and vegetables.     CURRENT EXERCISE:  Current exercise: decreased past few months - was doing swimming and gym x 3-5/wk - planning to restart soon  Restrictions to exercise: pain in knees/back     Vitamins / Minerals / Herbs:   MV with iron x 2  Vit D     Food Allergies:   strawberries     Social:  Retired.  Alcohol: None.  Smoking: None.     ASSESSMENT:  Patient demonstrates some willingness to change lifestyle habits as evidenced by weight loss, better choices when dining out and healthier  cooking at home.     Doing fairly well with working on greatest challenges (starchy CHO and emotional eating).     Barriers to Education:  none  Stage of Change: action     NUTRITION DIAGNOSIS:  Obesity related to Excessive calorie intake and Physical inactivity as evidence by BMI.  Status: Improved     PLAN:  Pt is a good candidate for surgery.     Diet: Maintain diet plan.  Continue to expand variety of vegetables.  Continue to cut back on starchy CHO in the diet.  5-6 meals per day.  Start including protein supplements in the diet plan daily.     Exercise: Increase.     Behavior Modification: Begin to document food & activity logs daily. Assert[employer] jony: [id number]     Weight loss prior to surgery: goal weight per Dr. Clements 348lbs before surgery     Communicated nutrition plan with bariatric team.     SESSION TIME:  30 minutes

## 2019-03-21 NOTE — PATIENT INSTRUCTIONS
Maintain diet plan.  Continue expanding variety of vegetables.  Continue to cut back on starchy CHO in the diet.  5-6 meals per day.  Continue protein supplements in the diet plan daily.     Exercise: Increase.     Behavior Modification: Begin to document food & activity logs daily. [employer] jony: [id number]

## 2019-03-23 RX ORDER — CELECOXIB 200 MG/1
CAPSULE ORAL
Qty: 30 CAPSULE | Refills: 1 | Status: SHIPPED | OUTPATIENT
Start: 2019-03-23 | End: 2019-05-06 | Stop reason: SDUPTHER

## 2019-03-27 ENCOUNTER — HOSPITAL ENCOUNTER (OUTPATIENT)
Facility: HOSPITAL | Age: 54
Discharge: HOME | End: 2019-03-27
Attending: INTERNAL MEDICINE | Admitting: INTERNAL MEDICINE
Payer: MEDICARE

## 2019-03-27 ENCOUNTER — ANESTHESIA EVENT (OUTPATIENT)
Dept: ENDOSCOPY | Facility: HOSPITAL | Age: 54
End: 2019-03-27
Payer: MEDICARE

## 2019-03-27 ENCOUNTER — ANESTHESIA (OUTPATIENT)
Dept: ENDOSCOPY | Facility: HOSPITAL | Age: 54
End: 2019-03-27
Payer: MEDICARE

## 2019-03-27 VITALS
OXYGEN SATURATION: 98 % | RESPIRATION RATE: 18 BRPM | SYSTOLIC BLOOD PRESSURE: 123 MMHG | HEART RATE: 89 BPM | BODY MASS INDEX: 48.82 KG/M2 | TEMPERATURE: 98 F | DIASTOLIC BLOOD PRESSURE: 67 MMHG | WEIGHT: 293 LBS | HEIGHT: 65 IN

## 2019-03-27 DIAGNOSIS — K21.9 GERD (GASTROESOPHAGEAL REFLUX DISEASE): ICD-10-CM

## 2019-03-27 DIAGNOSIS — K21.9 GASTROESOPHAGEAL REFLUX DISEASE, ESOPHAGITIS PRESENCE NOT SPECIFIED: Primary | ICD-10-CM

## 2019-03-27 LAB
POCT GLUCOSE: 104 MG/DL (ref 70–110)
POCT GLUCOSE: 114 MG/DL (ref 70–110)

## 2019-03-27 PROCEDURE — D9220A PRA ANESTHESIA: Mod: CRNA,,, | Performed by: NURSE ANESTHETIST, CERTIFIED REGISTERED

## 2019-03-27 PROCEDURE — 63600175 PHARM REV CODE 636 W HCPCS: Performed by: NURSE ANESTHETIST, CERTIFIED REGISTERED

## 2019-03-27 PROCEDURE — 37000009 HC ANESTHESIA EA ADD 15 MINS: Performed by: INTERNAL MEDICINE

## 2019-03-27 PROCEDURE — 25000003 PHARM REV CODE 250: Performed by: INTERNAL MEDICINE

## 2019-03-27 PROCEDURE — 82962 GLUCOSE BLOOD TEST: CPT | Performed by: INTERNAL MEDICINE

## 2019-03-27 PROCEDURE — D9220A PRA ANESTHESIA: ICD-10-PCS | Mod: CRNA,,, | Performed by: NURSE ANESTHETIST, CERTIFIED REGISTERED

## 2019-03-27 PROCEDURE — 43235 EGD DIAGNOSTIC BRUSH WASH: CPT | Performed by: INTERNAL MEDICINE

## 2019-03-27 PROCEDURE — 43235 PR EGD, FLEX, DIAGNOSTIC: ICD-10-PCS | Mod: ,,, | Performed by: INTERNAL MEDICINE

## 2019-03-27 PROCEDURE — D9220A PRA ANESTHESIA: ICD-10-PCS | Mod: ANES,,, | Performed by: ANESTHESIOLOGY

## 2019-03-27 PROCEDURE — 43235 EGD DIAGNOSTIC BRUSH WASH: CPT | Mod: ,,, | Performed by: INTERNAL MEDICINE

## 2019-03-27 PROCEDURE — D9220A PRA ANESTHESIA: Mod: ANES,,, | Performed by: ANESTHESIOLOGY

## 2019-03-27 PROCEDURE — 25000003 PHARM REV CODE 250: Performed by: NURSE ANESTHETIST, CERTIFIED REGISTERED

## 2019-03-27 PROCEDURE — 37000008 HC ANESTHESIA 1ST 15 MINUTES: Performed by: INTERNAL MEDICINE

## 2019-03-27 RX ORDER — LIDOCAINE HCL/PF 100 MG/5ML
SYRINGE (ML) INTRAVENOUS
Status: DISCONTINUED | OUTPATIENT
Start: 2019-03-27 | End: 2019-03-27

## 2019-03-27 RX ORDER — PROPOFOL 10 MG/ML
INJECTION, EMULSION INTRAVENOUS
Status: DISCONTINUED | OUTPATIENT
Start: 2019-03-27 | End: 2019-03-27

## 2019-03-27 RX ORDER — SODIUM CHLORIDE 9 MG/ML
INJECTION, SOLUTION INTRAVENOUS CONTINUOUS
Status: DISCONTINUED | OUTPATIENT
Start: 2019-03-27 | End: 2019-03-27 | Stop reason: HOSPADM

## 2019-03-27 RX ORDER — SODIUM CHLORIDE 0.9 % (FLUSH) 0.9 %
3 SYRINGE (ML) INJECTION
Status: DISCONTINUED | OUTPATIENT
Start: 2019-03-27 | End: 2019-03-27 | Stop reason: HOSPADM

## 2019-03-27 RX ORDER — GLYCOPYRROLATE 0.2 MG/ML
INJECTION INTRAMUSCULAR; INTRAVENOUS
Status: DISCONTINUED | OUTPATIENT
Start: 2019-03-27 | End: 2019-03-27

## 2019-03-27 RX ORDER — PROPOFOL 10 MG/ML
INJECTION, EMULSION INTRAVENOUS CONTINUOUS PRN
Status: DISCONTINUED | OUTPATIENT
Start: 2019-03-27 | End: 2019-03-27

## 2019-03-27 RX ADMIN — GLYCOPYRROLATE 0.2 MG: 0.2 INJECTION, SOLUTION INTRAMUSCULAR; INTRAVENOUS at 03:03

## 2019-03-27 RX ADMIN — PROPOFOL 200 MCG/KG/MIN: 10 INJECTION, EMULSION INTRAVENOUS at 03:03

## 2019-03-27 RX ADMIN — PROPOFOL 50 MG: 10 INJECTION, EMULSION INTRAVENOUS at 03:03

## 2019-03-27 RX ADMIN — SODIUM CHLORIDE: 0.9 INJECTION, SOLUTION INTRAVENOUS at 02:03

## 2019-03-27 RX ADMIN — LIDOCAINE HYDROCHLORIDE 100 MG: 20 INJECTION, SOLUTION INTRAVENOUS at 03:03

## 2019-03-27 NOTE — PROGRESS NOTES
Patient dressed, waiting to speak to Dr. Wilfrido Bar regarding results of procedure. Endo notified.

## 2019-03-27 NOTE — ANESTHESIA PREPROCEDURE EVALUATION
03/27/2019  Alivia Tohmas is a 53 y.o., female.    Anesthesia Evaluation    I have reviewed the Patient Summary Reports.     I have reviewed the Medications.     Review of Systems  Anesthesia Hx:  No problems with previous Anesthesia  History of prior surgery of interest to airway management or planning: Denies Family Hx of Anesthesia complications.   Denies Personal Hx of Anesthesia complications.   Social:  Non-Smoker    Cardiovascular:   Hypertension    Pulmonary:   Asthma mild Sleep Apnea, CPAP    Hepatic/GI:   GERD, well controlled    Musculoskeletal:   Arthritis     Endocrine:   Diabetes        Physical Exam  General:  Well nourished    Airway/Jaw/Neck:  Airway Findings: Mouth Opening: Normal Tongue: Normal  General Airway Assessment: Adult  Improves to I with phonation.  TM Distance: Normal, at least 6 cm      Dental:  Dental Findings: In tact        Mental Status:  Mental Status Findings:  Cooperative, Alert and Oriented         Anesthesia Plan  Type of Anesthesia, risks & benefits discussed:  Anesthesia Type:  general  Patient's Preference:   Intra-op Monitoring Plan: standard ASA monitors  Intra-op Monitoring Plan Comments:   Post Op Pain Control Plan: per primary service following discharge from PACU  Post Op Pain Control Plan Comments:   Induction:   IV  Beta Blocker:  Patient is not currently on a Beta-Blocker (No further documentation required).       Informed Consent: Patient understands risks and agrees with Anesthesia plan.  Questions answered. Anesthesia consent signed with patient.  ASA Score: 3     Day of Surgery Review of History & Physical:    H&P update referred to the surgeon.         Ready For Surgery From Anesthesia Perspective.

## 2019-03-27 NOTE — H&P
History & Physical - Short Stay  Gastroenterology      SUBJECTIVE:     Procedure: EGD    Chief Complaint/Indication for Procedure: Reflux    History of Present Illness:  Patient is a 53 y.o. female with gerd coming for EGD    PTA Medications   Medication Sig    aspirin (ECOTRIN) 81 MG EC tablet Take 1 tablet by mouth every morning. prevents heart attacks and strokes    atorvastatin (LIPITOR) 20 MG tablet TAKE 1 TABLET BY MOUTH DAILY    celecoxib (CELEBREX) 200 MG capsule TAKE 1 CAPSULE BY MOUTH DAILY    FLUoxetine 20 MG tablet Take 1 tablet (20 mg total) by mouth once daily.    insulin detemir U-100 (LEVEMIR FLEXPEN) 100 unit/mL (3 mL) SubQ InPn pen Inject 40 Units into the skin every evening. Inject 40 units every evening    insulin lispro (HUMALOG KWIKPEN INSULIN) 100 unit/mL pen INJECT 11 UNITS SUBCUTANEOUSLY BEFORE MEAL(S) PLUS  SLIDING  SCALE max 63 Units per day    lisinopril (PRINIVIL,ZESTRIL) 2.5 MG tablet TAKE 1 TABLET BY MOUTH DAILY FOR PROTEINURIA    metFORMIN (GLUCOPHAGE-XR) 500 MG 24 hr tablet TAKE 2 TABLETS BY MOUTH TWICE DAILY    MULTIVIT-IRON-MIN-FOLIC ACID 3,500-18-0.4 UNIT-MG-MG ORAL CHEW Take 1 tablet by mouth once daily.    omeprazole (PRILOSEC) 20 MG capsule TAKE 1 CAPSULE BY MOUTH EVERY MORNING.    oxyCODONE-acetaminophen (PERCOCET)  mg per tablet Take 1 tablet by mouth every 8 (eight) hours as needed for Pain.    vitamin D 185 MG Tab Take 5,000 mg by mouth once daily.    blood sugar diagnostic Strp Freestyle light strips and lancets    blood sugar diagnostic Strp Check glucose four times daily Strips and lancets covered by insurance E11.9 - One touch    blood-glucose meter kit Check glucose four times daily E11.9 meter covered by insurance One Touch    diclofenac sodium (VOLTAREN) 1 % Gel Apply 2 g topically once daily.    fluconazole (DIFLUCAN) 150 MG Tab TAKE ONE TABLET BY MOUTH ONCE DAILY    FLUoxetine 20 MG capsule TAKE 1 CAPSULE  20 MG TOTAL  BY MOUTH ONCE DAILY  "   fluticasone (FLONASE) 50 mcg/actuation nasal spray 1 spray by Each Nare route 2 (two) times daily as needed for Rhinitis.    pen needle, diabetic 33 gauge x 5/32" Ndle 1 application by Misc.(Non-Drug; Combo Route) route 4 (four) times daily with meals and nightly.    VENTOLIN HFA 90 mcg/actuation inhaler INHALE TWO PUFFS INTO LUNGS EVERY 4 TO 6 HOURS AS NEEDED FOR SHORTNESS OF BREATH AND FOR WHEEZING       Review of patient's allergies indicates:   Allergen Reactions    Strawberry Anaphylaxis        Past Medical History:   Diagnosis Date    Allergy     Anemia     Asthma     Depression 1/28/2019    Diabetes mellitus type II     DJD (degenerative joint disease) of knee 6/19/2014    Facet arthritis of lumbar region 12/17/2015    Facet syndrome 12/17/2015    GERD (gastroesophageal reflux disease)     Heartburn     Hyperlipidemia     Hypertension     Morbid obesity     Neuromuscular disorder     Sacroiliitis 6/13/2018    Sleep apnea      Past Surgical History:   Procedure Laterality Date    BLOCK, NERVE, INTERCOSTAL, SINGLE Left 4/2/2014    [facility]    BLOCK-NERVE-MEDIAL BRANCH-LUMBAR Bilateral 3/1/2016    [facility] MGT    BLOCK-NERVE-MEDIAL BRANCH-LUMBAR Bilateral 2/16/2016    [facility]    CARPAL TUNNEL RELEASE      CARPAL TUNNEL RELEASE  1980s    left    CARPAL TUNNEL RELEASE  2012    right    ESOPHAGOGASTRODUODENOSCOPY (EGD) N/A 12/19/2016    [facility] (2ND FLR)    JOINT REPLACEMENT Bilateral     with 2 revisions on rt    KNEE SURGERY  3/2010    orthroscope    KNEE SURGERY  6-19-14    left TKR    Radiofrequency Ablation LEFT L2,3,4,5 RFA Left 12/27/2018    [facility] at [facility] MGT    Radiofrequency Ablation RIGHT LUMBAR L2,3,4,5 RFA Right 1/29/2019    [facility] at [facility] " MGT    RADIOFREQUENCY ABLATION, NERVE, MEDIAL BRANCH, LUMBAR, 1 LEVEL Right 7/10/2018    [facility]    RADIOFREQUENCY ABLATION, NERVE, MEDIAL BRANCH, LUMBAR, 1 LEVEL Left 6/26/2018    [facility]    RADIOFREQUENCY THERMOCOAGULATION (RFTC)-NERVE-MEDIAN BRANCH-LUMBAR Right 1/9/2018    [facility]    RADIOFREQUENCY THERMOCOAGULATION (RFTC)-NERVE-MEDIAN BRANCH-LUMBAR Left 12/26/2017    [facility]    RADIOFREQUENCY THERMOCOAGULATION (RFTC)-NERVE-MEDIAN BRANCH-LUMBAR Right 10/19/2016    [facility]    RADIOFREQUENCY THERMOCOAGULATION (RFTC)-NERVE-MEDIAN BRANCH-LUMBAR Left 10/5/2016    [facility]    RADIOFREQUENCY THERMOCOAGULATION (RFTC)-NERVE-MEDIAN BRANCH-LUMBAR Right 4/20/2016    [facility]    RADIOFREQUENCY THERMOCOAGULATION (RFTC)-NERVE-MEDIAN BRANCH-LUMBAR Left 4/6/2016    [facility]    RADIOFREQUENCY THERMOCOAGULATION (RFTC)-NERVE-MEDIAN BRANCH-LUMBAR/COOLED Right 5/9/2017    [facility]    RADIOFREQUENCY THERMOCOAGULATION (RFTC)-NERVE-MEDIAN BRANCH-LUMBAR/COOLED Left 4/26/2017    [facility]    RELEASE, TRIGGER FINGER Right 2/4/2013    [facility] 1ST FLR    REPLACEMENT-KNEE-TOTAL Left 6/19/2014    [facility] at [facility] OR 2ND FLR    Revision Carpal Tunnel-Right Right 12/14/2015    [facility] at [facility]    REVISION-ARTHROPLASTY-KNEE-TOTAL Right 11/4/2014    Performed by Theodore Swan MD at [facility] OR 2ND FLR     Family History   Problem Relation Age of Onset    Diabetes Mother     Cataracts Mother     Diabetes  Father     Cataracts Father     Coronary artery disease Brother     Diabetes Brother     Hypertension Sister     Diabetes Sister     No Known Problems Sister     Cancer Sister         lymphoma     Diabetes Brother     Hypotension Brother     Kidney failure Brother     Hypotension Brother     Amblyopia Neg Hx     Blindness Neg Hx     Glaucoma Neg Hx     Macular degeneration Neg Hx     Retinal detachment Neg Hx     Strabismus Neg Hx     Stroke Neg Hx     Thyroid disease Neg Hx      Social History     Tobacco Use    Smoking status: Never Smoker    Smokeless tobacco: Never Used   Substance Use Topics    Alcohol use: Yes     Comment: occasionally     Drug use: No          OBJECTIVE:     Vital Signs (Most Recent)  Temp: 98 °F (36.7 °C) (03/27/19 1355)  Pulse: 85 (03/27/19 1355)  Resp: 16 (03/27/19 1355)  BP: 130/73 (03/27/19 1355)  SpO2: 99 % (03/27/19 1355)         ASSESSMENT/PLAN:     Patient is a 53 y.o. female with gerd coming for EGD    Plan: EGD    Anesthesia Plan: Moderate Sedation    ASA Grade: ASA 2 - Patient with mild systemic disease with no functional limitations

## 2019-03-27 NOTE — [employer] PATIENT INSTRUCTIONS
Discharge Summary/Instructions after an Endoscopic Procedure  Patient Name: Alivia Thmoas  Patient MRN: [id number]  Patient YOB: 1965 Wednesday, March 27, 2019  Robbin Bar MD  RESTRICTIONS:  During your procedure today, you received medications for sedation.  These   medications may affect your judgment, balance and coordination.  Therefore,   for 24 hours, you have the following restrictions:   - DO NOT drive a car, operate machinery, make legal/financial decisions,   sign important papers or drink alcohol.    ACTIVITY:  Today: no heavy lifting, straining or running due to procedural   sedation/anesthesia.  The following day: return to full activity including work.  DIET:  Eat and drink normally unless instructed otherwise.     TREATMENT FOR COMMON SIDE EFFECTS:  - Mild abdominal pain, nausea, belching, bloating or excessive gas:  rest,   eat lightly and use a heating pad.  - Sore Throat: treat with throat lozenges and/or gargle with warm salt   water.  - Because air was used during the procedure, expelling large amounts of air   from your rectum or belching is normal.  - If a bowel prep was taken, you may not have a bowel movement for 1-3 days.    This is normal.  SYMPTOMS TO WATCH FOR AND REPORT TO YOUR PHYSICIAN:  1. Abdominal pain or bloating, other than gas cramps.  2. Chest pain.  3. Back pain.  4. Signs of infection such as: chills or fever occurring within 24 hours   after the procedure.  5. Rectal bleeding, which would show as bright red, maroon, or black stools.   (A tablespoon of blood from the rectum is not serious, especially if   hemorrhoids are present.)  6. Vomiting.  7. Weakness or dizziness.  GO DIRECTLY TO THE NEAREST EMERGENCY ROOM IF YOU HAVE ANY OF THE FOLLOWING:      Difficulty breathing              Chills and/or fever over 101 F   Persistent vomiting and/or vomiting blood   Severe abdominal pain   Severe chest pain   Black, tarry stools   Bleeding- more than one  tablespoon   Any other symptom or condition that you feel may need urgent attention  Your doctor recommends these additional instructions:  If any biopsies were taken, your doctors clinic will contact you in 1 to 2   weeks with any results.  - Discharge patient to home.   - Resume previous diet.   - Continue present medications.   - Await pathology results.   - Return to referring physician.  For questions, problems or results please call your physician - Robbin Bar MD at Work:  [phone].  [facility], EMERGENCY ROOM PHONE NUMBER: [phone]  IF A COMPLICATION OR EMERGENCY SITUATION ARISES AND YOU ARE UNABLE TO REACH   YOUR PHYSICIAN - GO DIRECTLY TO THE EMERGENCY ROOM.  Robbin Bar MD  3/27/2019 4:42:49 PM  This report has been verified and signed electronically.  [employer]

## 2019-03-27 NOTE — TRANSFER OF CARE
"Anesthesia Transfer of Care Note    Patient: Alivia Thomas    Procedure(s) Performed: Procedure(s) (LRB):  EGD (ESOPHAGOGASTRODUODENOSCOPY) (N/A)    Patient location: [facility]    Anesthesia Type: general    Transport from OR: Transported from OR on 6-10 L/min O2 by face mask with adequate spontaneous ventilation    Post pain: adequate analgesia    Post assessment: no apparent anesthetic complications and tolerated procedure well    Post vital signs: stable    Level of consciousness: awake, alert and oriented    Nausea/Vomiting: no nausea/vomiting    Complications: none    Transfer of care protocol was followed      Last vitals:   Visit Vitals  BP (!) 96/55   Pulse 97   Temp 36.6 °C (97.9 °F) (Temporal)   Resp 18   Ht 5' 5" (1.651 m)   Wt (!) 162.4 kg (358 lb)   SpO2 100%   Breastfeeding? No   BMI 59.57 kg/m²     "

## 2019-03-27 NOTE — PLAN OF CARE
Problem: Adult Inpatient Plan of Care  Goal: Plan of Care Review  Outcome: Outcome(s) achieved Date Met: 03/27/19    Discharge instructions  given to patient and family. Verbalized understanding. Patient stable, tolerating fluids. No complaints at this time. Patient adequate for discharge. Patient waiting to speak to Dr. Wilfrido Bar prior to leaving.

## 2019-03-27 NOTE — PROGRESS NOTES
Patient ready to go and asked if Dr. Wilfrido Bar can call her with the results rather than waiting for him.  Call placed to endo and left patient's number with endo nurse to give to Dr. Wilfrido Bar for MD to call patient.

## 2019-03-28 NOTE — ANESTHESIA POSTPROCEDURE EVALUATION
Anesthesia Post Evaluation    Patient: Alivia Thomas    Procedure(s) Performed: Procedure(s) (LRB):  EGD (ESOPHAGOGASTRODUODENOSCOPY) (N/A)    Final Anesthesia Type: general  Patient location during evaluation: PACU  Patient participation: Yes- Able to Participate  Level of consciousness: awake and alert  Post-procedure vital signs: reviewed and stable  Pain management: adequate  Airway patency: patent  PONV status at discharge: No PONV  Anesthetic complications: no      Cardiovascular status: stable  Respiratory status: unassisted and spontaneous ventilation  Hydration status: euvolemic  Follow-up not needed.          Vitals Value Taken Time   /67 3/27/2019  4:03 PM   Temp 36.8 °C (98.2 °F) 3/27/2019  4:00 PM   Pulse 81 3/27/2019  4:03 PM   Resp 19 3/27/2019  4:03 PM   SpO2 98 % 3/27/2019  4:03 PM   Vitals shown include unvalidated device data.      No case tracking events are documented in the log.      Pain/Aracely Score: Aracely Score: 10 (3/27/2019  4:00 PM)

## 2019-04-16 ENCOUNTER — TELEPHONE (OUTPATIENT)
Dept: INTERNAL MEDICINE | Facility: CLINIC | Age: 54
End: 2019-04-16

## 2019-04-16 NOTE — TELEPHONE ENCOUNTER
----- Message from Delisa Gomez LPN sent at 4/15/2019 12:48 PM CDT -----  Contact: Self. Call [phone]      ----- Message -----  From: Chelsea Rodrigues  Sent: 4/15/2019  12:12 PM  To: Debra DASILVA Staff    She had to cancel due to her brother being in hospice. She would like you to get her in on another day this week.

## 2019-04-16 NOTE — TELEPHONE ENCOUNTER
Left a message for the patient to call the office back.   To confirm first availbale appointment with pcp 05/06/19 at 1:30pm

## 2019-04-25 ENCOUNTER — PES CALL (OUTPATIENT)
Dept: ADMINISTRATIVE | Facility: CLINIC | Age: 54
End: 2019-04-25

## 2019-05-06 ENCOUNTER — OFFICE VISIT (OUTPATIENT)
Dept: INTERNAL MEDICINE | Facility: CLINIC | Age: 54
End: 2019-05-06
Payer: MEDICARE

## 2019-05-06 VITALS
OXYGEN SATURATION: 97 % | HEART RATE: 104 BPM | DIASTOLIC BLOOD PRESSURE: 70 MMHG | WEIGHT: 293 LBS | BODY MASS INDEX: 48.82 KG/M2 | SYSTOLIC BLOOD PRESSURE: 122 MMHG | TEMPERATURE: 98 F | HEIGHT: 65 IN

## 2019-05-06 DIAGNOSIS — M25.562 CHRONIC PAIN OF BOTH KNEES: ICD-10-CM

## 2019-05-06 DIAGNOSIS — M25.561 CHRONIC PAIN OF BOTH KNEES: ICD-10-CM

## 2019-05-06 DIAGNOSIS — K21.9 GASTROESOPHAGEAL REFLUX DISEASE WITHOUT ESOPHAGITIS: ICD-10-CM

## 2019-05-06 DIAGNOSIS — E78.5 HYPERLIPIDEMIA, UNSPECIFIED HYPERLIPIDEMIA TYPE: ICD-10-CM

## 2019-05-06 DIAGNOSIS — E11.9 TYPE 2 DIABETES MELLITUS WITHOUT COMPLICATION: ICD-10-CM

## 2019-05-06 DIAGNOSIS — G89.29 CHRONIC PAIN OF BOTH KNEES: ICD-10-CM

## 2019-05-06 PROCEDURE — 99215 OFFICE O/P EST HI 40 MIN: CPT | Mod: PBBFAC,25,PO | Performed by: INTERNAL MEDICINE

## 2019-05-06 PROCEDURE — 99999 PR PBB SHADOW E&M-EST. PATIENT-LVL V: ICD-10-PCS | Mod: PBBFAC,,, | Performed by: INTERNAL MEDICINE

## 2019-05-06 PROCEDURE — 93010 EKG 12-LEAD: ICD-10-PCS | Mod: S$PBB,,, | Performed by: INTERNAL MEDICINE

## 2019-05-06 PROCEDURE — 99214 PR OFFICE/OUTPT VISIT, EST, LEVL IV, 30-39 MIN: ICD-10-PCS | Mod: S$PBB,,, | Performed by: INTERNAL MEDICINE

## 2019-05-06 PROCEDURE — 99214 OFFICE O/P EST MOD 30 MIN: CPT | Mod: S$PBB,,, | Performed by: INTERNAL MEDICINE

## 2019-05-06 PROCEDURE — 93010 ELECTROCARDIOGRAM REPORT: CPT | Mod: S$PBB,,, | Performed by: INTERNAL MEDICINE

## 2019-05-06 PROCEDURE — 99999 PR PBB SHADOW E&M-EST. PATIENT-LVL V: CPT | Mod: PBBFAC,,, | Performed by: INTERNAL MEDICINE

## 2019-05-06 PROCEDURE — 93005 ELECTROCARDIOGRAM TRACING: CPT | Mod: PBBFAC,PO | Performed by: INTERNAL MEDICINE

## 2019-05-06 RX ORDER — CELECOXIB 200 MG/1
200 CAPSULE ORAL DAILY
Qty: 30 CAPSULE | Refills: 1 | Status: SHIPPED | OUTPATIENT
Start: 2019-05-06 | End: 2019-08-13

## 2019-05-06 RX ORDER — CANDESARTAN 4 MG/1
4 TABLET ORAL DAILY
Qty: 30 TABLET | Refills: 3 | Status: ON HOLD | OUTPATIENT
Start: 2019-05-06 | End: 2019-07-29

## 2019-05-06 RX ORDER — INSULIN LISPRO 100 [IU]/ML
INJECTION, SOLUTION INTRAVENOUS; SUBCUTANEOUS
Qty: 1 BOX | Refills: 6 | Status: SHIPPED | OUTPATIENT
Start: 2019-05-06 | End: 2019-08-26

## 2019-05-06 RX ORDER — OMEPRAZOLE 20 MG/1
20 CAPSULE, DELAYED RELEASE ORAL EVERY MORNING
Qty: 90 CAPSULE | Refills: 3 | Status: SHIPPED | OUTPATIENT
Start: 2019-05-06 | End: 2020-05-15

## 2019-05-06 RX ORDER — FLUOXETINE HYDROCHLORIDE 40 MG/1
40 CAPSULE ORAL DAILY
Qty: 90 CAPSULE | Refills: 3 | Status: SHIPPED | OUTPATIENT
Start: 2019-05-06 | End: 2019-09-20

## 2019-05-06 RX ORDER — METFORMIN HYDROCHLORIDE 500 MG/1
1000 TABLET, EXTENDED RELEASE ORAL 2 TIMES DAILY
Qty: 360 TABLET | Refills: 3 | Status: SHIPPED | OUTPATIENT
Start: 2019-05-06 | End: 2019-09-20

## 2019-05-06 RX ORDER — ATORVASTATIN CALCIUM 20 MG/1
20 TABLET, FILM COATED ORAL DAILY
Qty: 90 TABLET | Refills: 3 | Status: SHIPPED | OUTPATIENT
Start: 2019-05-06 | End: 2019-09-20

## 2019-05-19 NOTE — PROGRESS NOTES
Subjective:       Patient ID: Alivia Thomas is a 53 y.o. female.    Chief Complaint: Follow-up    HPIPt is sad as brother just passed away on hospice - no CP or SOB.  Has not been watching her diabetes as well.  Plans for gastric bypass.  Review of Systems   Respiratory: Negative for shortness of breath (PND or orthopnea).    Cardiovascular: Negative for chest pain (arm pain or jaw pain).   Gastrointestinal: Negative for abdominal pain, diarrhea, nausea and vomiting.   Genitourinary: Negative for dysuria.   Neurological: Negative for seizures, syncope and headaches.       Objective:      Physical Exam   Constitutional: She is oriented to person, place, and time. She appears well-developed and well-nourished. No distress.   HENT:   Head: Normocephalic.   Mouth/Throat: Oropharynx is clear and moist.   Neck: Neck supple. No JVD present. No thyromegaly present.   Cardiovascular: Normal rate, regular rhythm, normal heart sounds and intact distal pulses. Exam reveals no gallop and no friction rub.   No murmur heard.  Pulmonary/Chest: Effort normal and breath sounds normal. She has no wheezes. She has no rales.   Abdominal: Soft. Bowel sounds are normal. She exhibits no distension and no mass. There is no tenderness. There is no rebound and no guarding.   Musculoskeletal: She exhibits no edema.   Lymphadenopathy:     She has no cervical adenopathy.   Neurological: She is alert and oriented to person, place, and time. She has normal reflexes.   Skin: Skin is warm and dry.   Psychiatric: She has a normal mood and affect. Her behavior is normal. Judgment and thought content normal.       Assessment:       1. Gastroesophageal reflux disease without esophagitis    2. Uncontrolled type 2 diabetes mellitus without complication, with long-term current use of insulin    3. Hyperlipidemia, unspecified hyperlipidemia type    4. Chronic pain of both knees        Plan:   Gastroesophageal reflux disease without esophagitis  -      omeprazole (PRILOSEC) 20 MG capsule; Take 1 capsule (20 mg total) by mouth every morning.  Dispense: 90 capsule; Refill: 3    Uncontrolled type 2 diabetes mellitus without complication, with long-term current use of insulin  -     metFORMIN (GLUCOPHAGE-XR) 500 MG 24 hr tablet; Take 2 tablets (1,000 mg total) by mouth 2 (two) times daily.  Dispense: 360 tablet; Refill: 3  -     insulin detemir U-100 (LEVEMIR FLEXPEN) 100 unit/mL (3 mL) SubQ InPn pen; Inject 40 units every evening  Dispense: 36 mL; Refill: 6  -     Ambulatory consult to Podiatry  -     Hemoglobin A1c; Future; Expected date: 05/06/2019  -     EKG 12-lead    Hyperlipidemia, unspecified hyperlipidemia type  -     atorvastatin (LIPITOR) 20 MG tablet; Take 1 tablet (20 mg total) by mouth once daily.  Dispense: 90 tablet; Refill: 3    Chronic pain of both knees  -     celecoxib (CELEBREX) 200 MG capsule; Take 1 capsule (200 mg total) by mouth once daily.  Dispense: 30 capsule; Refill: 1    Other orders  -     FLUoxetine 40 MG capsule; Take 1 capsule (40 mg total) by mouth once daily.  Dispense: 90 capsule; Refill: 3  -     insulin lispro (HUMALOG KWIKPEN INSULIN) 100 unit/mL pen; INJECT 11 UNITS SUBCUTANEOUSLY BEFORE MEAL(S) PLUS  SLIDING  SCALE max 63 Units per day  Dispense: 1 Box; Refill: 6  -     candesartan (ATACAND) 4 MG tablet; Take 1 tablet (4 mg total) by mouth once daily.  Dispense: 30 tablet; Refill: 3

## 2019-05-20 ENCOUNTER — HOSPITAL ENCOUNTER (OUTPATIENT)
Dept: RADIOLOGY | Facility: HOSPITAL | Age: 54
Discharge: HOME | End: 2019-05-20
Attending: OBSTETRICS & GYNECOLOGY
Payer: MEDICARE

## 2019-05-20 ENCOUNTER — OFFICE VISIT (OUTPATIENT)
Dept: PODIATRY | Facility: CLINIC | Age: 54
End: 2019-05-20
Payer: MEDICARE

## 2019-05-20 ENCOUNTER — TELEPHONE (OUTPATIENT)
Dept: PODIATRY | Facility: CLINIC | Age: 54
End: 2019-05-20

## 2019-05-20 ENCOUNTER — OFFICE VISIT (OUTPATIENT)
Dept: OBSTETRICS AND GYNECOLOGY | Facility: CLINIC | Age: 54
End: 2019-05-20
Payer: MEDICARE

## 2019-05-20 VITALS
RESPIRATION RATE: 18 BRPM | HEART RATE: 69 BPM | DIASTOLIC BLOOD PRESSURE: 87 MMHG | SYSTOLIC BLOOD PRESSURE: 146 MMHG | WEIGHT: 293 LBS | BODY MASS INDEX: 48.82 KG/M2 | HEIGHT: 65 IN

## 2019-05-20 VITALS
HEIGHT: 65 IN | DIASTOLIC BLOOD PRESSURE: 80 MMHG | SYSTOLIC BLOOD PRESSURE: 120 MMHG | BODY MASS INDEX: 48.82 KG/M2 | WEIGHT: 293 LBS

## 2019-05-20 DIAGNOSIS — Z12.31 VISIT FOR SCREENING MAMMOGRAM: ICD-10-CM

## 2019-05-20 DIAGNOSIS — E11.49 TYPE II DIABETES MELLITUS WITH NEUROLOGICAL MANIFESTATIONS: Primary | ICD-10-CM

## 2019-05-20 DIAGNOSIS — Z01.419 ENCOUNTER FOR GYNECOLOGICAL EXAMINATION WITHOUT ABNORMAL FINDING: Primary | ICD-10-CM

## 2019-05-20 DIAGNOSIS — B35.1 DERMATOPHYTOSIS OF NAIL: ICD-10-CM

## 2019-05-20 PROCEDURE — 99499 UNLISTED E&M SERVICE: CPT | Mod: S$PBB,,, | Performed by: PODIATRIST

## 2019-05-20 PROCEDURE — 11721 PR DEBRIDEMENT OF NAILS, 6 OR MORE: ICD-10-PCS | Mod: Q9,S$PBB,, | Performed by: PODIATRIST

## 2019-05-20 PROCEDURE — 99999 PR PBB SHADOW E&M-EST. PATIENT-LVL III: ICD-10-PCS | Mod: PBBFAC,,, | Performed by: PODIATRIST

## 2019-05-20 PROCEDURE — G0101 CA SCREEN;PELVIC/BREAST EXAM: HCPCS | Mod: S$PBB,,, | Performed by: OBSTETRICS & GYNECOLOGY

## 2019-05-20 PROCEDURE — 99499 NO LOS: ICD-10-PCS | Mod: S$PBB,,, | Performed by: PODIATRIST

## 2019-05-20 PROCEDURE — 77067 MAMMO DIGITAL SCREENING BILAT WITH CAD: ICD-10-PCS | Mod: 26,,, | Performed by: RADIOLOGY

## 2019-05-20 PROCEDURE — 77067 SCR MAMMO BI INCL CAD: CPT | Mod: TC

## 2019-05-20 PROCEDURE — 99999 PR PBB SHADOW E&M-EST. PATIENT-LVL III: ICD-10-PCS | Mod: PBBFAC,,, | Performed by: OBSTETRICS & GYNECOLOGY

## 2019-05-20 PROCEDURE — 11721 DEBRIDE NAIL 6 OR MORE: CPT | Mod: Q9,S$PBB,, | Performed by: PODIATRIST

## 2019-05-20 PROCEDURE — 99213 OFFICE O/P EST LOW 20 MIN: CPT | Mod: PBBFAC | Performed by: PODIATRIST

## 2019-05-20 PROCEDURE — 99999 PR PBB SHADOW E&M-EST. PATIENT-LVL III: CPT | Mod: PBBFAC,,, | Performed by: PODIATRIST

## 2019-05-20 PROCEDURE — G0101 PR CA SCREEN;PELVIC/BREAST EXAM: ICD-10-PCS | Mod: S$PBB,,, | Performed by: OBSTETRICS & GYNECOLOGY

## 2019-05-20 PROCEDURE — 99213 OFFICE O/P EST LOW 20 MIN: CPT | Mod: PBBFAC,25,27 | Performed by: OBSTETRICS & GYNECOLOGY

## 2019-05-20 PROCEDURE — 77067 SCR MAMMO BI INCL CAD: CPT | Mod: 26,,, | Performed by: RADIOLOGY

## 2019-05-20 PROCEDURE — 99999 PR PBB SHADOW E&M-EST. PATIENT-LVL III: CPT | Mod: PBBFAC,,, | Performed by: OBSTETRICS & GYNECOLOGY

## 2019-05-20 PROCEDURE — 11721 DEBRIDE NAIL 6 OR MORE: CPT | Mod: Q9,PBBFAC | Performed by: PODIATRIST

## 2019-05-20 NOTE — PROGRESS NOTES
Subjective:      Patient ID: Alivia Thomas is a 53 y.o. female.    Chief Complaint: PCP (Clarisse Richardson MD 5/06/19); Diabetic Foot Exam; Nail Problem; and Nail Care    Alivia is a 53 y.o. female who presents to the clinic for evaluation and treatment of high risk feet. Alivia has a past medical history of Allergy, Anemia, Asthma, Depression (1/28/2019), Diabetes mellitus type II, DJD (degenerative joint disease) of knee (6/19/2014), Facet arthritis of lumbar region (12/17/2015), Facet syndrome (12/17/2015), GERD (gastroesophageal reflux disease), Heartburn, Hyperlipidemia, Hypertension, Morbid obesity, Neuromuscular disorder, Sacroiliitis (6/13/2018), and Sleep apnea. The patient's chief complaint is long, thick toenails. This patient has documented high risk feet requiring routine maintenance secondary to diabetes mellitis and those secondary complications of diabetes, as mentioned..    PCP: Clarisse Richardson MD    Date Last Seen by PCP:   Chief Complaint   Patient presents with    PCP     Clarisse Richardson MD 5/06/19    Diabetic Foot Exam    Nail Problem    Nail Care           Hemoglobin A1C   Date Value Ref Range Status   01/23/2019 7.5 (H) 4.0 - 5.6 % Final     Comment:     ADA Screening Guidelines:  5.7-6.4%  Consistent with prediabetes  >or=6.5%  Consistent with diabetes  High levels of fetal hemoglobin interfere with the HbA1C  assay. Heterozygous hemoglobin variants (HbS, HgC, etc)do  not significantly interfere with this assay.   However, presence of multiple variants may affect accuracy.     08/20/2018 10.7 (H) 4.0 - 5.6 % Final     Comment:     ADA Screening Guidelines:  5.7-6.4%  Consistent with prediabetes  >or=6.5%  Consistent with diabetes  High levels of fetal hemoglobin interfere with the HbA1C  assay. Heterozygous hemoglobin variants (HbS, HgC, etc)do  not significantly interfere with this assay.   However, presence of multiple variants may affect accuracy.     05/17/2018 7.6 (H)  4.0 - 5.6 % Final     Comment:     According to ADA guidelines, hemoglobin A1c <7.0% represents  optimal control in non-pregnant diabetic patients. Different  metrics may apply to specific patient populations.   Standards of Medical Care in Diabetes-2016.  For the purpose of screening for the presence of diabetes:  <5.7%     Consistent with the absence of diabetes  5.7-6.4%  Consistent with increasing risk for diabetes   (prediabetes)  >or=6.5%  Consistent with diabetes  Currently, no consensus exists for use of hemoglobin A1c  for diagnosis of diabetes for children.  This Hemoglobin A1c assay has significant interference with fetal   hemoglobin   (HbF). The results are invalid for patients with abnormal amounts of   HbF,   including those with known Hereditary Persistence   of Fetal Hemoglobin. Heterozygous hemoglobin variants (HbAS, HbAC,   HbAD, HbAE, HbA2) do not significantly interfere with this assay;   however, presence of multiple variants in a sample may impact the %   interference.         Review of Systems   Constitution: Negative for chills, decreased appetite and fever.   Cardiovascular: Negative for leg swelling.   Skin: Positive for dry skin and nail changes.   Musculoskeletal: Negative for arthritis, joint pain, joint swelling and myalgias.   Gastrointestinal: Negative for nausea and vomiting.   Neurological: Negative for loss of balance, numbness and paresthesias.           Objective:      Physical Exam   Constitutional: She is oriented to person, place, and time. She appears well-developed and well-nourished.   Cardiovascular:   Dorsalis pedis and posterior tibial pulses are diminished Bilaterally. Toes are cool to touch. Feet are warm proximally.There is decreased digital hair. Skin is atrophic, slightly hyperpigmented, and mildly edematous       Musculoskeletal: Normal range of motion. She exhibits no edema or tenderness.   Adequate joint range of motion without pain, limitation, nor crepitation  Bilateral feet and ankle joints. Muscle strength is 5/5 in all groups bilaterally.         Neurological: She is alert and oriented to person, place, and time.   [place] 5.07 monofilamant testing is diminished Selvin feet. Sharp/dull sensation diminished Bilaterally. Light touch absent Bilaterally.       Skin: Skin is warm and dry. No ecchymosis and no lesion noted. No erythema.   Nails x10 are elongated by  2=5mm's, thickened by 2=4 mm's, dystrophic, and are darkened in  coloration . Xerosis Bilaterally. No open lesions noted.       Psychiatric: She has a normal mood and affect. Her behavior is normal.             Assessment:       Encounter Diagnoses   Name Primary?    Type II diabetes mellitus with neurological manifestations Yes    Dermatophytosis of nail          Plan:       Alivia was seen today for pcp, diabetic foot exam, nail problem and nail care.    Diagnoses and all orders for this visit:    Type II diabetes mellitus with neurological manifestations    Dermatophytosis of nail      I counseled the patient on her conditions, their implications and medical management.        - Shoe inspection. Diabetic Foot Education. Patient reminded of the importance of good nutrition and blood sugar control to help prevent podiatric complications of diabetes. Patient instructed on proper foot hygeine. We discussed wearing proper shoe gear, daily foot inspections, never walking without protective shoe gear, never putting sharp instruments to feet, routine podiatric nail visits every 2-3 months.      - With patient's permission, nails were aggressively reduced and debrided x 10 to their soft tissue attachment mechanically and with electric , removing all offending nail and debris. Patient relates relief following the procedure. She will continue to monitor the areas daily, inspect her feet, wear protective shoe gear when ambulatory, moisturizer to maintain skin integrity and follow in this office in  approximately 2-3 months, sooner p.r.n.

## 2019-05-20 NOTE — TELEPHONE ENCOUNTER
----- Message from Phoebe Dinh sent at 5/20/2019 10:25 AM CDT -----  Contact: pt  Can the clinic reply in [facility]: no    Please refill the medication(s) listed below. Please call the patient when the prescription(s) is ready for  at this phone number      [phone]    Medication #1oxycodone-acetaminophen (PERCOCET)  mg per tablet       Preferred Pharmacy:[facility] [address]

## 2019-05-20 NOTE — TELEPHONE ENCOUNTER
Called  and spoke with patient ,Patient stated she need to be seen today,I schedule appointment today at 9:45am with Dr Rowland.          ----- Message from Lotus Truong sent at 5/20/2019  7:00 AM CDT -----  Contact: Pt  Pt called to speak to the nurse regarding her care and would like a call back at [phone] to schedule a work in appt for diabetic foot care

## 2019-05-21 ENCOUNTER — OFFICE VISIT (OUTPATIENT)
Dept: PAIN MEDICINE | Facility: CLINIC | Age: 54
End: 2019-05-21
Payer: MEDICARE

## 2019-05-21 VITALS
HEART RATE: 82 BPM | TEMPERATURE: 99 F | SYSTOLIC BLOOD PRESSURE: 157 MMHG | HEIGHT: 65 IN | WEIGHT: 293 LBS | RESPIRATION RATE: 18 BRPM | DIASTOLIC BLOOD PRESSURE: 95 MMHG | BODY MASS INDEX: 48.82 KG/M2

## 2019-05-21 DIAGNOSIS — M47.816 FACET ARTHRITIS OF LUMBAR REGION: ICD-10-CM

## 2019-05-21 DIAGNOSIS — M47.816 SPONDYLOSIS OF LUMBAR REGION WITHOUT MYELOPATHY OR RADICULOPATHY: ICD-10-CM

## 2019-05-21 DIAGNOSIS — Z79.891 ENCOUNTER FOR LONG-TERM OPIATE ANALGESIC USE: ICD-10-CM

## 2019-05-21 DIAGNOSIS — M47.819 OSTEOARTHRITIS OF SPINE WITHOUT MYELOPATHY OR RADICULOPATHY, UNSPECIFIED SPINAL REGION: ICD-10-CM

## 2019-05-21 DIAGNOSIS — M47.816 LUMBAR SPONDYLOSIS: Primary | ICD-10-CM

## 2019-05-21 PROCEDURE — 99214 OFFICE O/P EST MOD 30 MIN: CPT | Mod: S$PBB,,, | Performed by: NURSE PRACTITIONER

## 2019-05-21 PROCEDURE — 99214 PR OFFICE/OUTPT VISIT, EST, LEVL IV, 30-39 MIN: ICD-10-PCS | Mod: S$PBB,,, | Performed by: NURSE PRACTITIONER

## 2019-05-21 PROCEDURE — 99213 OFFICE O/P EST LOW 20 MIN: CPT | Mod: PBBFAC | Performed by: NURSE PRACTITIONER

## 2019-05-21 PROCEDURE — 99999 PR PBB SHADOW E&M-EST. PATIENT-LVL III: ICD-10-PCS | Mod: PBBFAC,,, | Performed by: NURSE PRACTITIONER

## 2019-05-21 PROCEDURE — 99999 PR PBB SHADOW E&M-EST. PATIENT-LVL III: CPT | Mod: PBBFAC,,, | Performed by: NURSE PRACTITIONER

## 2019-05-21 RX ORDER — LISINOPRIL 2.5 MG/1
TABLET ORAL
Refills: 10 | COMMUNITY
Start: 2019-05-18 | End: 2019-08-21

## 2019-05-21 RX ORDER — OXYCODONE AND ACETAMINOPHEN 10; 325 MG/1; MG/1
1 TABLET ORAL EVERY 8 HOURS PRN
Qty: 90 TABLET | Refills: 0 | Status: ON HOLD | OUTPATIENT
Start: 2019-05-21 | End: 2019-06-18 | Stop reason: SDUPTHER

## 2019-05-21 NOTE — PROGRESS NOTES
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 significant relief  10/5/16 Left L2,3,4,5 cooled RFA  10/19/16 Right L2,3,4,5 cooled RFA  4/26/17 Left L2,3,4,5 cooled RFA  5/9/17 Right L2,3,4,5 cooled RFA  12/26/2017- Left L2,3,4,5 cooled RFA  1/9/2018- Right L2,3,4,5 cooled RFA  6/26/18 Left L2,3,4,5 cooled RFA- 60% relief  7/10/18 Right L2,3,4,5 cooled RFA- 60% relief  9/28/18 TPIs- significant relief  12/27/18 Left L2,3,4,5 RFA- 70% relief  1/29/19 Right L2,3,4,5 RFA- 70% relief    Relevant Surgeries: right knee surgery x3 (arthroscopic, then replacement and subsequent revision surgery), s/p left total knee arthroplasty    Relevant Imaging:  Xray Lumbar spine 09/21/2015  Lumbar spine radiograph    Comparison: None    Results: AP, lateral neutral, lateral flexion , lateral extension, bilateral oblique and spot views. The alignment of the lumbar spine demonstrates a mild levoscoliosis . 11-mm anterior listhesis of L4 relative to L5 with no translational abnormalities  seen on flexion and extension views. The vertebral body heights are well-maintained , mild disk space narrowing L4-L5 and L5-S1. Mild anterior and marginal osteophyte formation seen throughout the lumbar spine . The oblique views demonstrate no   definite spondylolysis. There is facet joint osseous hypertrophy noted at  L3-L4 and L4-L5.      Impression         The Significant spondylosis of the lumbar spine with grade 1 anterior listhesis L4 relative to L5.  Facet joint osseous hypertrophy L3-L4 and L4-5.      Electronically signed by: BLESSING ROE MD  Date: 09/21/15  Time: 09:56          X-ray knee bilateral 8/26/2015:  Standing AP knees, lateral view of both knees and sunrise view of both patella. Study compared to May 2015. Postop change of bilateral knee replacement. The prosthetic components are in satisfactory position. Erosive changes involving the patella   again evident bilaterally.    Impression no significant change.      Xray Bilateral Knee 05/25/2015:  There are bilateral total knee arthroplasties with posterior resurfacing of the patella. As observed on 12/17/2014 there is a fracture of the left patella in the parasagittal plane.    Heterotopic bone is evident about each knee.    I detect no dislocation, unusual radiopaque retained foreign body, lytic or blastic lesion, or chondrocalcinosis.    Cervical MRI 4/24/2018:    Narrative     EXAMINATION:  MRI CERVICAL SPINE WITHOUT CONTRAST    CLINICAL HISTORY:  Neck pain, first study;.  Cervicalgia.    TECHNIQUE:  Multiplanar, multisequence MR images of the cervical spine were acquired without the administration of contrast.    COMPARISON:  None.    FINDINGS:  There is reversal of the normal cervical lordosis which could be related to patient positioning versus muscle spasm.    Cervical vertebral body heights are well maintained without evidence of acute fracture or dislocation.  Marrow signal is unremarkable.    Spinal canal contents are unremarkable.  No abnormal masses or collections.    Individual levels as detailed below:    C2-3: No significant spinal canal stenosis or neuroforaminal narrowing.    C3-4: Facet arthropathy.  No significant spinal canal stenosis or neuroforaminal narrowing.    C4-5: Facet arthropathy.  Small broad-based disc osteophyte complex.   Mild right neuroforaminal narrowing.  Mild spinal canal stenosis.    C5-6: Facet arthropathy.  Uncovertebral joint spurring.  Broad-based posterior disc osteophyte complex.  Mild right neuroforaminal narrowing.  Mild spinal canal stenosis.    C6-7: Facet arthropathy.  No significant spinal canal stenosis or neuroforaminal narrowing.    C7-T1: No significant spinal canal stenosis or neuroforaminal narrowing.    Paraspinal muscles are unremarkable.  Soft tissues are intact.   Impression       Mild multilevel cervical spondylosis with mild spinal canal stenosis and mild right neuroforaminal narrowing at C4-5 and C5-6.       Lab Results   Component Value Date    HGBA1C 7.5 (H) 01/23/2019     Lab Results   Component Value Date    CHOL 161 01/23/2019    CHOL 152 05/17/2018    CHOL 191 05/09/2017     Lab Results   Component Value Date    HDL 51 01/23/2019    HDL 50 05/17/2018    HDL 48 05/09/2017     Lab Results   Component Value Date    LDLCALC 94.2 01/23/2019    LDLCALC 91.0 05/17/2018    LDLCALC 129.0 05/09/2017     Lab Results   Component Value Date    TRIG 79 01/23/2019    TRIG 55 05/17/2018    TRIG 70 05/09/2017     Lab Results   Component Value Date    CHOLHDL 31.7 01/23/2019    CHOLHDL 32.9 05/17/2018    CHOLHDL 25.1 05/09/2017         Past Medical History:   Diagnosis Date    Allergy     Anemia     Asthma     Depression 1/28/2019    Diabetes mellitus type II     DJD (degenerative joint disease) of knee 6/19/2014    Facet arthritis of lumbar region 12/17/2015    Facet syndrome 12/17/2015    GERD (gastroesophageal reflux disease)     Heartburn     Hyperlipidemia     Hypertension     Morbid obesity     Neuromuscular disorder     Sacroiliitis 6/13/2018    Sleep apnea      Past Surgical History:   Procedure Laterality Date    BLOCK, NERVE, INTERCOSTAL, SINGLE Left 4/2/2014    [facility]formed [facility] at [facility] PAIN [facility]    BLOCK-NERVE-MEDIAL BRANCH-LUMBAR Bilateral 3/1/2016    Performed by  [facility] at [facility]    BLOCK-NERVE-MEDIAL BRANCH-LUMBAR Bilateral 2/16/2016    Performed by [facility] at [facility]    CARPAL TUNNEL RELEASE      CARPAL TUNNEL RELEASE  1980s    left    CARPAL TUNNEL RELEASE  2012    right    EGD (ESOPHAGOGASTRODUODENOSCOPY) N/A 3/27/2019    Performed by Robbin [facility] at [facility] (2ND F[facility])    ESOPHAGOGASTRODUODENOSCOPY (EGD) N/A 12/19/2016    Performed by Gulshan Stroud MD at [facility] (2ND FLR)    JOINT REPLACEMENT Bilateral     with 2 revisions on rt    KNEE SURGERY  3/2010    orthroscope    KNEE SURGERY  6-19-14    left TKR    Radiofrequency Ablation LEFT L2,3,4,5 RFA Left 12/27/2018    Performed by [facility] at [facility]    Radiofrequency Ablation RIGHT LUMBAR L2,3,4,5 RFA Right 1/29/2019    Performed by [facility] Wagner [facility] at [facility]    RADIOFREQUENCY ABLATION, NERVE, MEDIAL BRANCH, LUMBAR, 1 LEVEL Right 7/10/2018    Performed by L[facility] Wagner MD at [facility]    RADIOFREQUENCY ABLATION, NERVE, MEDIAL BRANCH, LUMBAR, 1 LEVEL Left 6/26/2018    Performed by L[facility] MD at [facility]    RADIOFREQUENCY THERMOCOAGULATION (RFTC)-NERVE-MEDIAN BRANCH-LUMBAR Right 1/9/2018    Performed by [facility] at [facility]    RADIOFREQUENCY THERMOCOAGULATION (RFTC)-NERVE-MEDIAN BRANCH-LUMBAR Left 12/26/2017    Performed by [facility] Wagner MD at [facility]    RADIOFREQUENCY THERMOCOAGULATION (RFTC)-NERVE-MEDIAN BRANCH-LUMBAR Right 10/19/2016    Performed by Lina Wagner MD at [facility]    RADIOFREQUENCY THERMOCOAGULATION (RFTC)-NERVE-MEDIAN BRANCH-LUMBAR Left 10/5/2016    Performed by [facility] at [facility]    RADIOFREQUENCY THERMOCOAGULATION (RFTC)-NERVE-MEDIAN BRANCH-LUMBAR Right 4/20/2016    Performed by Lina Wagner MD at [facility] PAIN MGT    RADIOFREQUENCY THERMOCOAGULATION (RFTC)-NERVE-MEDIAN BRANCH-LUMBAR Left 4/6/2016    Performed by  L[facility] Wagner MD at [facility] PAIN MGT    RADIOFREQUENCY THERMOCOAGULATION (RFTC)-NERVE-MEDIAN BRANCH-LUMBAR/COOLED Right 5/9/2017    Performed by Lina Wagner MD at [facility] PAIN MGT    RADIOFREQUENCY THERMOCOAGULATION (RFTC)-NERVE-MEDIAN BRANCH-LUMBAR/COOLED Left 4/26/2017    Performed by Lina Wagner MD at [facility] PAIN MGT    RELEASE, TRIGGER FINGER Right 2/4/2013    Performed by Velma Garcia MD at [facility] OR 1ST FLR    REPLACEMENT-KNEE-TOTAL Left 6/19/2014    Performed by Theodore Swan MD at [facility] OR 2ND FLR    Revision Carpal Tunnel-Right Right 12/14/2015    Performed by [facility] at [facility] OR    REVISION-ARTHROPLASTY-KNEE-TOTAL Right 11/4/2014    Performed by Theodore Swan MD at [facility] OR 2ND FLR     Family History   Problem Relation Age of Onset    Diabetes Mother     Cataracts Mother     Diabetes Father     Cataracts Father     Coronary artery disease Brother     Diabetes Brother     Hypertension Sister     Diabetes Sister     No Known Problems Sister     Cancer Sister         lymphoma     Diabetes Brother     Hypotension Brother     Kidney failure Brother     Hypotension Brother     Amblyopia Neg Hx     Blindness Neg Hx     Glaucoma Neg Hx     Macular degeneration Neg Hx     Retinal detachment Neg Hx     Strabismus Neg Hx     Stroke Neg Hx     Thyroid disease Neg Hx      Social History     Socioeconomic History    Marital status:      Spouse name: Not on file    Number of children: Not on file    Years of education: Not on file    Highest education level: Not on file   Occupational History    Not on file   Social Needs    Financial resource strain: Not on file    Food insecurity:     Worry: Not on file     Inability: Not on file    Transportation needs:     Medical: Not on file     Non-medical: Not on file   Tobacco Use    Smoking status: Never Smoker    Smokeless tobacco: Never Used   Substance and Sexual Activity    Alcohol  use: Yes     Comment: occasionally     Drug use: No    Sexual activity: Yes     Partners: Female     Birth control/protection: None   Lifestyle    Physical activity:     Days per week: Not on file     Minutes per session: Not on file    Stress: Not on file   Relationships    Social connections:     Talks on phone: Not on file     Gets together: Not on file     Attends Jew service: Not on file     Active member of club or organization: Not on file     Attends meetings of clubs or organizations: Not on file     Relationship status: Not on file   Other Topics Concern    Not on file   Social History Narrative    Disabled. The patient is the youngest of 6 siblings. Single. Lives with single-sex partner.                  Review of patient's allergies indicates:  No Known Allergies    Medication List with Changes/Refills   Current Medications    ASPIRIN (ECOTRIN) 81 MG EC TABLET    Take 1 tablet by mouth every morning. prevents heart attacks and strokes    ATORVASTATIN (LIPITOR) 20 MG TABLET    Take 1 tablet (20 mg total) by mouth once daily.    BLOOD SUGAR DIAGNOSTIC STRP    Freestyle light strips and lancets    BLOOD SUGAR DIAGNOSTIC STRP    Check glucose four times daily Strips and lancets covered by insurance E11.9 - One touch    BLOOD-GLUCOSE METER KIT    Check glucose four times daily E11.9 meter covered by insurance One Touch    CANDESARTAN (ATACAND) 4 MG TABLET    Take 1 tablet (4 mg total) by mouth once daily.    CELECOXIB (CELEBREX) 200 MG CAPSULE    Take 1 capsule (200 mg total) by mouth once daily.    DICLOFENAC SODIUM (VOLTAREN) 1 % GEL    Apply 2 g topically once daily.    FLUOXETINE 40 MG CAPSULE    Take 1 capsule (40 mg total) by mouth once daily.    FLUTICASONE (FLONASE) 50 MCG/ACTUATION NASAL SPRAY    1 spray by Each Nare route 2 (two) times daily as needed for Rhinitis.    INSULIN DETEMIR U-100 (LEVEMIR FLEXPEN) 100 UNIT/ML (3 ML) SUBQ INPN PEN    Inject 40 units every evening    INSULIN  "LISPRO (HUMALOG KWIKPEN INSULIN) 100 UNIT/ML PEN    INJECT 11 UNITS SUBCUTANEOUSLY BEFORE MEAL(S) PLUS  SLIDING  SCALE max 63 Units per day    LISINOPRIL (PRINIVIL,ZESTRIL) 2.5 MG TABLET    TAKE 1 TABLET BY MOUTH DAILY FOR PROTEINURIA    METFORMIN (GLUCOPHAGE-XR) 500 MG 24 HR TABLET    Take 2 tablets (1,000 mg total) by mouth 2 (two) times daily.    MULTIVIT-IRON-MIN-FOLIC ACID 3,500-18-0.4 UNIT-MG-MG ORAL CHEW    Take 1 tablet by mouth once daily.    OMEPRAZOLE (PRILOSEC) 20 MG CAPSULE    Take 1 capsule (20 mg total) by mouth every morning.    PEN NEEDLE, DIABETIC 33 GAUGE X 5/32" NDLE    1 application by Misc.(Non-Drug; Combo Route) route 4 (four) times daily with meals and nightly.    VENTOLIN HFA 90 MCG/ACTUATION INHALER    INHALE TWO PUFFS INTO LUNGS EVERY 4 TO 6 HOURS AS NEEDED FOR SHORTNESS OF BREATH AND FOR WHEEZING    VITAMIN D 185 MG TAB    Take 5,000 mg by mouth once daily.         REVIEW OF SYSTEMS:    GENERAL:  Patient is attempting to lose weight.  RESPIRATORY:  Negative for cough, wheezing or shortness of breath, patient denies any recent URI.  CARDIOVASCULAR:  Negative for chest pain, leg swelling or palpitations.  GI:  Negative for abdominal discomfort, blood in stools or black stools or change in bowel habits, occasional constipation.  MUSCULOSKELETAL:  See HPI.  SKIN:  Negative for lesions, rash, and itching.  PSYCH:  No mood disorder or recent psychosocial stressors.  Patient's sleep is disturbed secondary to pain (patient reports also that she has insomnia).  HEMATOLOGY/LYMPHOLOGY:  Negative for prolonged bleeding, bruising easily or swollen nodes.  81 mg aspirin  ENDO: Patient has a history of diabetes  NEURO:   No history of headaches, syncope, paralysis, seizures or tremors.  All other reviewed and negative other than HPI.    OBJECTIVE:    BP (!) 157/95   Pulse 82   Temp 98.6 °F (37 °C)   Resp 18   Ht 5' 5" (1.651 m)   Wt (!) 160 kg (352 lb 11.8 oz)   BMI 58.70 kg/m²     PHYSICAL " EXAMINATION:    GENERAL: Well appearing, in no acute distress, alert and oriented x3.   PSYCH:  Mood and affect appropriate.  SKIN: Skin color, texture, turgor normal, no rashes or lesions.  HEAD/FACE:  Normocephalic, atraumatic.   CV: RRR with palpation of the radial artery.  PULM: No evidence of respiratory difficulty, symmetric chest rise.  BACK: Negative SLR bilaterally. There is pain to palpation over the lumbar facet joints and paraspinals bilaterally.  Limited ROM with pain on flexion.  Positive facet loading bilaterally, L>R.  EXTREMITIES: Pain with palpation to bilateral SI joints.  JENNA is negative bilaterally. Well-healed midline scars to bilateral knees.  Painful extension and flexion of bilateral knees. Medial and lateral joint line tenderness to bilateral knees.  MUSCULOSKELETAL: Bilateral upper and lower extremity strength is normal and symmetric.  No atrophy or tone abnormalities are noted.  NEURO: Bilateral lower extremity coordination and muscle stretch reflexes are physiologic and symmetric.  Plantar response are downgoing. No clonus.  No loss of sensation is noted.  GAIT: Antalgic- ambulates without assistance.      Assessment:       Encounter Diagnoses   Name Primary?    Lumbar spondylosis Yes    Osteoarthritis of spine without myelopathy or radiculopathy, unspecified spinal region     Facet arthritis of lumbar region     Encounter for long-term opiate analgesic use     Spondylosis of lumbar region without myelopathy or radiculopathy          Plan:       - Previous imaging was reviewed and discussed with the patient today.     - Schedule for left then right L2,3,4,5 RFAs after June.    - Continue oxycodone-acetaminophen 10/325 mg one tablet every 8 hours PRN pain, #90, 0 refills.  She does not need a refill but can call for 2 additional refills.    - The patient is here today for a refill of current pain medications and they believe these provide effective pain control and improvements in  quality of life.  The patient notes no serious side effects, and feels the benefits outweigh the risks.  The patient was reminded of the pain contract that they signed previously as well as the risks and benefits of the medication including possible death.  The updated [place] [employer] prescription monitoring program was reviewed, and the patient has been found to be compliant with current treatment plan.     - UDS from 2/26/2019 reviewed and consistent.  No repeat today.    - She reports a good family support system at this time due to the recent passing of her brother and her father.    - Continue topical pain cream PRN up to 5x/day.    - RTC 2 weeks after completion of procedures.    - Dr. Wagner was consulted on the patient and agrees with this plan.      The above plan and management options were discussed at length with patient. Patient is in agreement with the above and verbalized understanding.     Virginia Sanchez, EMILY  05/21/2019

## 2019-05-21 NOTE — H&P (VIEW-ONLY)
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 significant relief  10/5/16 Left L2,3,4,5 cooled RFA  10/19/16 Right L2,3,4,5 cooled RFA  4/26/17 Left L2,3,4,5 cooled RFA  5/9/17 Right L2,3,4,5 cooled RFA  12/26/2017- Left L2,3,4,5 cooled RFA  1/9/2018- Right L2,3,4,5 cooled RFA  6/26/18 Left L2,3,4,5 cooled RFA- 60% relief  7/10/18 Right L2,3,4,5 cooled RFA- 60% relief  9/28/18 TPIs- significant relief  12/27/18 Left L2,3,4,5 RFA- 70% relief  1/29/19 Right L2,3,4,5 RFA- 70% relief    Relevant Surgeries: right knee surgery x3 (arthroscopic, then replacement and subsequent revision surgery), s/p left total knee arthroplasty    Relevant Imaging:  Xray Lumbar spine 09/21/2015  Lumbar spine radiograph    Comparison: None    Results: AP, lateral neutral, lateral flexion , lateral extension, bilateral oblique and spot views. The alignment of the lumbar spine demonstrates a mild levoscoliosis . 11-mm anterior listhesis of L4 relative to L5 with no translational abnormalities  seen on flexion and extension views. The vertebral body heights are well-maintained , mild disk space narrowing L4-L5 and L5-S1. Mild anterior and marginal osteophyte formation seen throughout the lumbar spine . The oblique views demonstrate no   definite spondylolysis. There is facet joint osseous hypertrophy noted at  L3-L4 and L4-L5.      Impression         The Significant spondylosis of the lumbar spine with grade 1 anterior listhesis L4 relative to L5.  Facet joint osseous hypertrophy L3-L4 and L4-5.      Electronically signed by: BLESSING ROE MD  Date: 09/21/15  Time: 09:56          X-ray knee bilateral 8/26/2015:  Standing AP knees, lateral view of both knees and sunrise view of both patella. Study compared to May 2015. Postop change of bilateral knee replacement. The prosthetic components are in satisfactory position. Erosive changes involving the patella   again evident bilaterally.    Impression no significant change.      Xray Bilateral Knee 05/25/2015:  There are bilateral total knee arthroplasties with posterior resurfacing of the patella. As observed on 12/17/2014 there is a fracture of the left patella in the parasagittal plane.    Heterotopic bone is evident about each knee.    I detect no dislocation, unusual radiopaque retained foreign body, lytic or blastic lesion, or chondrocalcinosis.    Cervical MRI 4/24/2018:    Narrative     EXAMINATION:  MRI CERVICAL SPINE WITHOUT CONTRAST    CLINICAL HISTORY:  Neck pain, first study;.  Cervicalgia.    TECHNIQUE:  Multiplanar, multisequence MR images of the cervical spine were acquired without the administration of contrast.    COMPARISON:  None.    FINDINGS:  There is reversal of the normal cervical lordosis which could be related to patient positioning versus muscle spasm.    Cervical vertebral body heights are well maintained without evidence of acute fracture or dislocation.  Marrow signal is unremarkable.    Spinal canal contents are unremarkable.  No abnormal masses or collections.    Individual levels as detailed below:    C2-3: No significant spinal canal stenosis or neuroforaminal narrowing.    C3-4: Facet arthropathy.  No significant spinal canal stenosis or neuroforaminal narrowing.    C4-5: Facet arthropathy.  Small broad-based disc osteophyte complex.   Mild right neuroforaminal narrowing.  Mild spinal canal stenosis.    C5-6: Facet arthropathy.  Uncovertebral joint spurring.  Broad-based posterior disc osteophyte complex.  Mild right neuroforaminal narrowing.  Mild spinal canal stenosis.    C6-7: Facet arthropathy.  No significant spinal canal stenosis or neuroforaminal narrowing.    C7-T1: No significant spinal canal stenosis or neuroforaminal narrowing.    Paraspinal muscles are unremarkable.  Soft tissues are intact.   Impression       Mild multilevel cervical spondylosis with mild spinal canal stenosis and mild right neuroforaminal narrowing at C4-5 and C5-6.       Lab Results   Component Value Date    HGBA1C 7.5 (H) 01/23/2019     Lab Results   Component Value Date    CHOL 161 01/23/2019    CHOL 152 05/17/2018    CHOL 191 05/09/2017     Lab Results   Component Value Date    HDL 51 01/23/2019    HDL 50 05/17/2018    HDL 48 05/09/2017     Lab Results   Component Value Date    LDLCALC 94.2 01/23/2019    LDLCALC 91.0 05/17/2018    LDLCALC 129.0 05/09/2017     Lab Results   Component Value Date    TRIG 79 01/23/2019    TRIG 55 05/17/2018    TRIG 70 05/09/2017     Lab Results   Component Value Date    CHOLHDL 31.7 01/23/2019    CHOLHDL 32.9 05/17/2018    CHOLHDL 25.1 05/09/2017         Past Medical History:   Diagnosis Date    Allergy     Anemia     Asthma     Depression 1/28/2019    Diabetes mellitus type II     DJD (degenerative joint disease) of knee 6/19/2014    Facet arthritis of lumbar region 12/17/2015    Facet syndrome 12/17/2015    GERD (gastroesophageal reflux disease)     Heartburn     Hyperlipidemia     Hypertension     Morbid obesity     Neuromuscular disorder     Sacroiliitis 6/13/2018    Sleep apnea      Past Surgical History:   Procedure Laterality Date    BLOCK, NERVE, INTERCOSTAL, SINGLE Left 4/2/2014    [facility]    BLOCK-NERVE-MEDIAL BRANCH-LUMBAR Bilateral 3/1/2016    Performed by  [facility] at [facility]    BLOCK-NERVE-MEDIAL BRANCH-LUMBAR Bilateral 2/16/2016    [facility] by [facility] at [facility]    CARPAL TUNNEL RELEASE      CARPAL TUNNEL RELEASE  1980s    left    CARPAL TUNNEL RELEASE  2012    right    EGD (ESOPHAGOGASTRODUODENOSCOPY) N/A 3/27/2019    Performed by Rob[facility] at [facility] (2ND FLR)    ESOPHAGOGASTRODUODENOSCOPY (EGD) N/A 12/19/2016    Performed by Gulshan Stroud MD at [facility] (2ND FLR)    JOINT REPLACEMENT Bilateral     with 2 revisions on rt    KNEE SURGERY  3/2010    orthroscope    KNEE SURGERY  6-19-14    left TKR    Radiofrequency Ablation LEFT L2,3,4,5 RFA Left 12/27/2018    Performed by [facility] at [facility]    Radiofrequency Ablation RIGHT LUMBAR L2,3,4,5 RFA Right 1/29/2019    Performed by [facility] at [facility]    RADIOFREQUENCY ABLATION, NERVE, MEDIAL BRANCH, LUMBAR, 1 LEVEL Right 7/10/2018    Performed by L[facility] at [facility]    RADIOFREQUENCY ABLATION, NERVE, MEDIAL BRANCH, LUMBAR, 1 LEVEL Left 6/26/2018    Performed by [facility] at [facility]    RADIOFREQUENCY THERMOCOAGULATION (RFTC)-NERVE-MEDIAN BRANCH-LUMBAR Right 1/9/2018    Performed by [facility] at [facility]    RADIOFREQUENCY THERMOCOAGULATION (RFTC)-NERVE-MEDIAN BRANCH-LUMBAR Left 12/26/2017    Performed by [facility] at [facility]    RADIOFREQUENCY THERMOCOAGULATION (RFTC)-NERVE-MEDIAN BRANCH-LUMBAR Right 10/19/2016    Performed by Lina Wagner MD at [facility]    RADIOFREQUENCY THERMOCOAGULATION (RFTC)-NERVE-MEDIAN BRANCH-LUMBAR Left 10/5/2016    Performed by [facility] at [facility]    RADIOFREQUENCY THERMOCOAGULATION (RFTC)-NERVE-MEDIAN BRANCH-LUMBAR Right 4/20/2016    Performed by L[facility] Wagner MD at [facility] PAIN MGT    RADIOFREQUENCY THERMOCOAGULATION (RFTC)-NERVE-MEDIAN BRANCH-LUMBAR Left 4/6/2016    Performed by  Lina Wagner MD at [facility] PAIN MGT    RADIOFREQUENCY THERMOCOAGULATION (RFTC)-NERVE-MEDIAN BRANCH-LUMBAR/COOLED Right 5/9/2017    Performed by Lina Wagner MD at [facility] PAIN MGT    RADIOFREQUENCY THERMOCOAGULATION (RFTC)-NERVE-MEDIAN BRANCH-LUMBAR/COOLED Left 4/26/2017    Performed by Lina Wagner MD at [facility] PAIN MGT    RELEASE, TRIGGER FINGER Right 2/4/2013    Performed by Velma Garcia MD at [facility] OR 1ST FLR    REPLACEMENT-KNEE-TOTAL Left 6/19/2014    Performed by Theodore Swan MD at [facility] OR 2ND FLR    Revision Carpal Tunnel-Right Right 12/14/2015    Performed by [facility] at [facility] OR    REVISION-ARTHROPLASTY-KNEE-TOTAL Right 11/4/2014    Performed by Theodore Swan MD at [facility] OR 2ND FLR     Family History   Problem Relation Age of Onset    Diabetes Mother     Cataracts Mother     Diabetes Father     Cataracts Father     Coronary artery disease Brother     Diabetes Brother     Hypertension Sister     Diabetes Sister     No Known Problems Sister     Cancer Sister         lymphoma     Diabetes Brother     Hypotension Brother     Kidney failure Brother     Hypotension Brother     Amblyopia Neg Hx     Blindness Neg Hx     Glaucoma Neg Hx     Macular degeneration Neg Hx     Retinal detachment Neg Hx     Strabismus Neg Hx     Stroke Neg Hx     Thyroid disease Neg Hx      Social History     Socioeconomic History    Marital status:      Spouse name: Not on file    Number of children: Not on file    Years of education: Not on file    Highest education level: Not on file   Occupational History    Not on file   Social Needs    Financial resource strain: Not on file    Food insecurity:     Worry: Not on file     Inability: Not on file    Transportation needs:     Medical: Not on file     Non-medical: Not on file   Tobacco Use    Smoking status: Never Smoker    Smokeless tobacco: Never Used   Substance and Sexual Activity    Alcohol  use: Yes     Comment: occasionally     Drug use: No    Sexual activity: Yes     Partners: Female     Birth control/protection: None   Lifestyle    Physical activity:     Days per week: Not on file     Minutes per session: Not on file    Stress: Not on file   Relationships    Social connections:     Talks on phone: Not on file     Gets together: Not on file     Attends [employer] service: Not on file     Active member of club or organization: Not on file     Attends meetings of clubs or organizations: Not on file     Relationship status: Not on file   Other Topics Concern    Not on file   Social History Narrative    Disabled. The patient is the youngest of 6 siblings. Single. Lives with single-sex partner.                  Review of patient's allergies indicates:  No Known Allergies    Medication List with Changes/Refills   Current Medications    ASPIRIN (ECOTRIN) 81 MG EC TABLET    Take 1 tablet by mouth every morning. prevents heart attacks and strokes    ATORVASTATIN (LIPITOR) 20 MG TABLET    Take 1 tablet (20 mg total) by mouth once daily.    BLOOD SUGAR DIAGNOSTIC STRP    Freestyle light strips and lancets    BLOOD SUGAR DIAGNOSTIC STRP    Check glucose four times daily Strips and lancets covered by insurance E11.9 - One touch    BLOOD-GLUCOSE METER KIT    Check glucose four times daily E11.9 meter covered by insurance One Touch    CANDESARTAN (ATACAND) 4 MG TABLET    Take 1 tablet (4 mg total) by mouth once daily.    CELECOXIB (CELEBREX) 200 MG CAPSULE    Take 1 capsule (200 mg total) by mouth once daily.    DICLOFENAC SODIUM (VOLTAREN) 1 % GEL    Apply 2 g topically once daily.    FLUOXETINE 40 MG CAPSULE    Take 1 capsule (40 mg total) by mouth once daily.    FLUTICASONE (FLONASE) 50 MCG/ACTUATION NASAL SPRAY    1 spray by Each Nare route 2 (two) times daily as needed for Rhinitis.    INSULIN DETEMIR U-100 (LEVEMIR FLEXPEN) 100 UNIT/ML (3 ML) SUBQ INPN PEN    Inject 40 units every evening    INSULIN  "LISPRO (HUMALOG KWIKPEN INSULIN) 100 UNIT/ML PEN    INJECT 11 UNITS SUBCUTANEOUSLY BEFORE MEAL(S) PLUS  SLIDING  SCALE max 63 Units per day    LISINOPRIL (PRINIVIL,ZESTRIL) 2.5 MG TABLET    TAKE 1 TABLET BY MOUTH DAILY FOR PROTEINURIA    METFORMIN (GLUCOPHAGE-XR) 500 MG 24 HR TABLET    Take 2 tablets (1,000 mg total) by mouth 2 (two) times daily.    MULTIVIT-IRON-MIN-FOLIC ACID 3,500-18-0.4 UNIT-MG-MG ORAL CHEW    Take 1 tablet by mouth once daily.    OMEPRAZOLE (PRILOSEC) 20 MG CAPSULE    Take 1 capsule (20 mg total) by mouth every morning.    PEN NEEDLE, DIABETIC 33 GAUGE X 5/32" NDLE    1 application by Misc.(Non-Drug; Combo Route) route 4 (four) times daily with meals and nightly.    VENTOLIN HFA 90 MCG/ACTUATION INHALER    INHALE TWO PUFFS INTO LUNGS EVERY 4 TO 6 HOURS AS NEEDED FOR SHORTNESS OF BREATH AND FOR WHEEZING    VITAMIN D 185 MG TAB    Take 5,000 mg by mouth once daily.         REVIEW OF SYSTEMS:    GENERAL:  Patient is attempting to lose weight.  RESPIRATORY:  Negative for cough, wheezing or shortness of breath, patient denies any recent URI.  CARDIOVASCULAR:  Negative for chest pain, leg swelling or palpitations.  GI:  Negative for abdominal discomfort, blood in stools or black stools or change in bowel habits, occasional constipation.  MUSCULOSKELETAL:  See HPI.  SKIN:  Negative for lesions, rash, and itching.  PSYCH:  No mood disorder or recent psychosocial stressors.  Patient's sleep is disturbed secondary to pain (patient reports also that she has insomnia).  HEMATOLOGY/LYMPHOLOGY:  Negative for prolonged bleeding, bruising easily or swollen nodes.  81 mg aspirin  ENDO: Patient has a history of diabetes  NEURO:   No history of headaches, syncope, paralysis, seizures or tremors.  All other reviewed and negative other than HPI.    OBJECTIVE:    BP (!) 157/95   Pulse 82   Temp 98.6 °F (37 °C)   Resp 18   Ht 5' 5" (1.651 m)   Wt (!) 160 kg (352 lb 11.8 oz)   BMI 58.70 kg/m²     PHYSICAL " EXAMINATION:    GENERAL: Well appearing, in no acute distress, alert and oriented x3.   PSYCH:  Mood and affect appropriate.  SKIN: Skin color, texture, turgor normal, no rashes or lesions.  HEAD/FACE:  Normocephalic, atraumatic.   CV: RRR with palpation of the radial artery.  PULM: No evidence of respiratory difficulty, symmetric chest rise.  BACK: Negative SLR bilaterally. There is pain to palpation over the lumbar facet joints and paraspinals bilaterally.  Limited ROM with pain on flexion.  Positive facet loading bilaterally, L>R.  EXTREMITIES: Pain with palpation to bilateral SI joints.  JENNA is negative bilaterally. Well-healed midline scars to bilateral knees.  Painful extension and flexion of bilateral knees. Medial and lateral joint line tenderness to bilateral knees.  MUSCULOSKELETAL: Bilateral upper and lower extremity strength is normal and symmetric.  No atrophy or tone abnormalities are noted.  NEURO: Bilateral lower extremity coordination and muscle stretch reflexes are physiologic and symmetric.  Plantar response are downgoing. No clonus.  No loss of sensation is noted.  GAIT: Antalgic- ambulates without assistance.      Assessment:       Encounter Diagnoses   Name Primary?    Lumbar spondylosis Yes    Osteoarthritis of spine without myelopathy or radiculopathy, unspecified spinal region     Facet arthritis of lumbar region     Encounter for long-term opiate analgesic use     Spondylosis of lumbar region without myelopathy or radiculopathy          Plan:       - Previous imaging was reviewed and discussed with the patient today.     - Schedule for left then right L2,3,4,5 RFAs after June.    - Continue oxycodone-acetaminophen 10/325 mg one tablet every 8 hours PRN pain, #90, 0 refills.  She does not need a refill but can call for 2 additional refills.    - The patient is here today for a refill of current pain medications and they believe these provide effective pain control and improvements in  quality of life.  The patient notes no serious side effects, and feels the benefits outweigh the risks.  The patient was reminded of the pain contract that they signed previously as well as the risks and benefits of the medication including possible death.  The updated [place] [employer] prescription monitoring program was reviewed, and the patient has been found to be compliant with current treatment plan.     - UDS from 2/26/2019 reviewed and consistent.  No repeat today.    - She reports a good family support system at this time due to the recent passing of her brother and her father.    - Continue topical pain cream PRN up to 5x/day.    - RTC 2 weeks after completion of procedures.    - Dr. Wagner was consulted on the patient and agrees with this plan.      The above plan and management options were discussed at length with patient. Patient is in agreement with the above and verbalized understanding.     Virginia Sanchez, EMILY  05/21/2019

## 2019-05-27 NOTE — PROGRESS NOTES
HISTORY OF PRESENT ILLNESS:    Alivia Thomas is a 53 y.o. female, , No LMP recorded. (Menstrual status: Other).,  presents for a routine exam and has no complaints.    Past Medical History:   Diagnosis Date    Allergy     Anemia     Asthma     Depression 2019    Diabetes mellitus type II     DJD (degenerative joint disease) of knee 2014    Facet arthritis of lumbar region 2015    Facet syndrome 2015    GERD (gastroesophageal reflux disease)     Heartburn     Hyperlipidemia     Hypertension     Morbid obesity     Neuromuscular disorder     Sacroiliitis 2018    Sleep apnea        Past Surgical History:   Procedure Laterality Date    BLOCK, NERVE, INTERCOSTAL, SINGLE Left 2014    Performed by [facility] at [facility]    BLOCK-NERVE-MEDIAL BRANCH-LUMBAR Bilateral 3/1/2016    Performed by [facility] at [facility]    BLOCK-NERVE-MEDIAL BRANCH-LUMBAR Bilateral 2016    [facility] by [facility] at [facility]    CARPAL TUNNEL RELEASE      CARPAL TUNNEL RELEASE  1980s    left    CARPAL TUNNEL RELEASE      right    EGD (ESOPHAGOGASTRODUODENOSCOPY) N/A 3/27/2019    Performed by Robbin [facility] at [facility] ENDO (2ND FLR)    ESOPHAGOGASTRODUODENOSCOPY (EGD) N/A 2016    Performed by Gulshan Stroud MD at [facility] ENDO (2ND FLR)    JOINT REPLACEMENT Bilateral     with 2 revisions on rt    KNEE SURGERY  3/2010    orthroscope    KNEE SURGERY  14    left TKR    Radiofrequency Ablation LEFT L2,3,4,5 RFA Left 2018    [facility] by [facility] at [facility]    Radiofrequency Ablation RIGHT LUMBAR L2,3,4,5 RFA Right 2019    Performed by [facility] at [facility]    RADIOFREQUENCY ABLATION, NERVE, MEDIAL BRANCH, LUMBAR, 1 LEVEL Right 7/10/2018    Per[facility] by [facility] at [facility]    RADIOFREQUENCY ABLATION, NERVE, MEDIAL BRANCH, LUMBAR, 1 LEVEL Left  6/26/2018    [facility] at [facility]    RADIOFREQUENCY THERMOCOAGULATION (RFTC)-NERVE-MEDIAN BRANCH-LUMBAR Right 1/9/2018    Performed by [facility] at [facility]    RADIOFREQUENCY THERMOCOAGULATION (RFTC)-NERVE-MEDIAN BRANCH-LUMBAR Left 12/26/2017    Performed by [facility] at [facility]    RADIOFREQUENCY THERMOCOAGULATION (RFTC)-NERVE-MEDIAN BRANCH-LUMBAR Right 10/19/2016    Performed by [facility] at [facility]    RADIOFREQUENCY THERMOCOAGULATION (RFTC)-NERVE-MEDIAN BRANCH-LUMBAR Left 10/5/2016    Performed by [facility] at [facility]    RADIOFREQUENCY THERMOCOAGULATION (RFTC)-NERVE-MEDIAN BRANCH-LUMBAR Right 4/20/2016    Performed by L[facility]    RADIOFREQUENCY THERMOCOAGULATION (RFTC)-NERVE-MEDIAN BRANCH-LUMBAR Left 4/6/2016    Performed by L[facility] at [facility]    RADIOFREQUENCY THERMOCOAGULATION (RFTC)-NERVE-MEDIAN BRANCH-LUMBAR/COOLED Right 5/9/2017    Performed by [facility] at [facility]    RADIOFREQUENCY THERMOCOAGULATION (RFTC)-NERVE-MEDIAN BRANCH-LUMBAR/COOLED Left 4/26/2017    Performed by [facility] Wagner MD at [facility]    RELEASE, TRIGGER FINGER Right 2/4/2013    Performed by Velma Garcia MD at [facility] OR 1ST FLR    REPLACEMENT-KNEE-TOTAL Left 6/19/2014    Performed by Theodore Swan MD at [facility] OR 2ND FLR    Revision Carpal Tunnel-Right Right 12/14/2015    Performed by [facility] at [facility] OR    REVISION-ARTHROPLASTY-KNEE-TOTAL Right 11/4/2014    Performed by Theodore Swan MD at [facility] OR 2ND FLR       MEDICATIONS AND ALLERGIES:      Current Outpatient Medications:     aspirin (ECOTRIN) 81 MG EC tablet, Take 1 tablet by mouth every morning. prevents heart attacks and strokes, Disp: , Rfl:     atorvastatin (LIPITOR) 20 MG tablet, Take 1 tablet (20 mg total) by mouth once daily., Disp: 90 tablet, Rfl: 3    blood  "sugar diagnostic Strp, Freestyle light strips and lancets, Disp: 100 each, Rfl: 6    blood sugar diagnostic Strp, Check glucose four times daily Strips and lancets covered by insurance E11.9 - One touch, Disp: 120 each, Rfl: 6    blood-glucose meter kit, Check glucose four times daily E11.9 meter covered by insurance One Touch, Disp: 1 each, Rfl: 0    candesartan (ATACAND) 4 MG tablet, Take 1 tablet (4 mg total) by mouth once daily., Disp: 30 tablet, Rfl: 3    celecoxib (CELEBREX) 200 MG capsule, Take 1 capsule (200 mg total) by mouth once daily., Disp: 30 capsule, Rfl: 1    diclofenac sodium (VOLTAREN) 1 % Gel, Apply 2 g topically once daily., Disp: 1 Tube, Rfl: 3    FLUoxetine 40 MG capsule, Take 1 capsule (40 mg total) by mouth once daily., Disp: 90 capsule, Rfl: 3    fluticasone (FLONASE) 50 mcg/actuation nasal spray, 1 spray by Each Nare route 2 (two) times daily as needed for Rhinitis., Disp: 15 g, Rfl: 0    insulin detemir U-100 (LEVEMIR FLEXPEN) 100 unit/mL (3 mL) SubQ InPn pen, Inject 40 units every evening, Disp: 36 mL, Rfl: 6    insulin lispro (HUMALOG KWIKPEN INSULIN) 100 unit/mL pen, INJECT 11 UNITS SUBCUTANEOUSLY BEFORE MEAL(S) PLUS  SLIDING  SCALE max 63 Units per day, Disp: 1 Box, Rfl: 6    metFORMIN (GLUCOPHAGE-XR) 500 MG 24 hr tablet, Take 2 tablets (1,000 mg total) by mouth 2 (two) times daily., Disp: 360 tablet, Rfl: 3    MULTIVIT-IRON-MIN-FOLIC ACID 3,500-18-0.4 UNIT-MG-MG ORAL CHEW, Take 1 tablet by mouth once daily., Disp: , Rfl:     omeprazole (PRILOSEC) 20 MG capsule, Take 1 capsule (20 mg total) by mouth every morning., Disp: 90 capsule, Rfl: 3    pen needle, diabetic 33 gauge x 5/32" Ndle, 1 application by Misc.(Non-Drug; Combo Route) route 4 (four) times daily with meals and nightly., Disp: 100 each, Rfl: 6    VENTOLIN HFA 90 mcg/actuation inhaler, INHALE TWO PUFFS INTO LUNGS EVERY 4 TO 6 HOURS AS NEEDED FOR SHORTNESS OF BREATH AND FOR WHEEZING, Disp: 18 each, Rfl: 0    " vitamin D 185 MG Tab, Take 5,000 mg by mouth once daily., Disp: , Rfl:     lisinopril (PRINIVIL,ZESTRIL) 2.5 MG tablet, TAKE 1 TABLET BY MOUTH DAILY FOR PROTEINURIA, Disp: , Rfl: 10    oxyCODONE-acetaminophen (PERCOCET)  mg per tablet, Take 1 tablet by mouth every 8 (eight) hours as needed for Pain., Disp: 90 tablet, Rfl: 0    Review of patient's allergies indicates:   Allergen Reactions    Strawberry Anaphylaxis       Family History   Problem Relation Age of Onset    Diabetes Mother     Cataracts Mother     Diabetes Father     Cataracts Father     Coronary artery disease Brother     Diabetes Brother     Hypertension Sister     Diabetes Sister     No Known Problems Sister     Cancer Sister         lymphoma     Diabetes Brother     Hypotension Brother     Kidney failure Brother     Hypotension Brother     Amblyopia Neg Hx     Blindness Neg Hx     Glaucoma Neg Hx     Macular degeneration Neg Hx     Retinal detachment Neg Hx     Strabismus Neg Hx     Stroke Neg Hx     Thyroid disease Neg Hx        Social History     Socioeconomic History    Marital status:      Spouse name: Not on file    Number of children: Not on file    Years of education: Not on file    Highest education level: Not on file   Occupational History    Not on file   Social Needs    Financial resource strain: Not on file    Food insecurity:     Worry: Not on file     Inability: Not on file    Transportation needs:     Medical: Not on file     Non-medical: Not on file   Tobacco Use    Smoking status: Never Smoker    Smokeless tobacco: Never Used   Substance and Sexual Activity    Alcohol use: Yes     Comment: occasionally     Drug use: No    Sexual activity: Yes     Partners: Female     Birth control/protection: None   Lifestyle    Physical activity:     Days per week: Not on file     Minutes per session: Not on file    Stress: Not on file   Relationships    Social connections:     Talks on phone: Not  "on file     Gets together: Not on file     Attends Yarsanism service: Not on file     Active member of club or organization: Not on file     Attends meetings of clubs or organizations: Not on file     Relationship status: Not on file   Other Topics Concern    Not on file   Social History Narrative    Disabled. The patient is the youngest of 6 siblings. Single. Lives with single-sex partner.                    COMPREHENSIVE GYN HISTORY:  PAP History: Denies abnormal Paps.  Infection History: Denies STDs. Denies PID.  Benign History: Denies uterine fibroids. Denies ovarian cysts. Denies endometriosis. Denies other conditions.  Cancer History: Denies cervical cancer. Denies uterine cancer or hyperplasia. Denies ovarian cancer. Denies vulvar cancer or pre-cancer. Denies vaginal cancer or pre-cancer. Denies breast cancer. Denies colon cancer.  Sexual Activity History: Reports currently being sexually active  Menstrual History: Monthly. Mod then light flow.   Dysmenorrhea History: Reports mild dysmenorrhea.       ROS:  GENERAL: No weight changes. No swelling. No fatigue. No fever.  CARDIOVASCULAR: No chest pain. No shortness of breath. No leg cramps.   NEUROLOGICAL: No headaches. No vision changes.  BREASTS: No pain. No lumps. No discharge.  ABDOMEN: No pain. No nausea. No vomiting. No diarrhea. No constipation.  REPRODUCTIVE: No abnormal bleeding.   VULVA: No pain. No lesions. No itching.  VAGINA: No relaxation. No itching. No odor. No discharge. No lesions.  URINARY: No incontinence. No nocturia. No frequency. No dysuria.    /80   Ht 5' 5" (1.651 m)   Wt (!) 163.7 kg (361 lb)   BMI 60.07 kg/m²     PE:  APPEARANCE: Well nourished, well developed, in no acute distress.  AFFECT: WNL, alert and oriented x 3.  SKIN: No acne or hirsutism.  NECK: Neck symmetric, without masses or thyromegaly.  NODES: No inguinal, cervical, axillary or femoral lymph node enlargement.  CHEST: Good respiratory effort.   ABDOMEN: Soft. " No tenderness or masses. No hepatosplenomegaly. No hernias.  BREASTS: Symmetrical, no skin changes, visible lesions, palpable masses or nipple discharge bilaterally.  PELVIC: External female genitalia without lesions.  Female hair distribution. Adequate perineal body, Normal urethral meatus. Vagina moist and well rugated without lesions or discharge.  No significant cystocele or rectocele present. Cervix pink without lesions, discharge or tenderness. Uterus is 4-6 week size, regular, mobile and nontender. Adnexa without masses or tenderness.  EXTREMITIES: No edema    DIAGNOSIS:  1. Encounter for gynecological examination without abnormal finding    2. Visit for screening mammogram      COUNSELING:  The patient was counseled today on:  -A.C.S. Pap and pelvic exam guidelines (pap every 3 years), recomendations for yearly mammogram;  -to follow up with her PCP for other health maintenance.    FOLLOW-UP with me annually.

## 2019-06-18 ENCOUNTER — HOSPITAL ENCOUNTER (OUTPATIENT)
Facility: OTHER | Age: 54
Discharge: HOME | End: 2019-06-18
Attending: ANESTHESIOLOGY | Admitting: ANESTHESIOLOGY
Payer: MEDICARE

## 2019-06-18 VITALS
WEIGHT: 293 LBS | SYSTOLIC BLOOD PRESSURE: 121 MMHG | RESPIRATION RATE: 18 BRPM | OXYGEN SATURATION: 97 % | DIASTOLIC BLOOD PRESSURE: 71 MMHG | TEMPERATURE: 99 F | BODY MASS INDEX: 48.82 KG/M2 | HEIGHT: 65 IN | HEART RATE: 82 BPM

## 2019-06-18 DIAGNOSIS — G89.29 CHRONIC PAIN: ICD-10-CM

## 2019-06-18 DIAGNOSIS — M47.816 LUMBAR SPONDYLOSIS: Primary | ICD-10-CM

## 2019-06-18 LAB — POCT GLUCOSE: 130 MG/DL (ref 70–110)

## 2019-06-18 PROCEDURE — 64635 DESTROY LUMB/SAC FACET JNT: CPT | Mod: LT,,, | Performed by: ANESTHESIOLOGY

## 2019-06-18 PROCEDURE — 63600175 PHARM REV CODE 636 W HCPCS: Performed by: ANESTHESIOLOGY

## 2019-06-18 PROCEDURE — 64636 DESTROY L/S FACET JNT ADDL: CPT | Performed by: ANESTHESIOLOGY

## 2019-06-18 PROCEDURE — 99152 MOD SED SAME PHYS/QHP 5/>YRS: CPT | Mod: ,,, | Performed by: ANESTHESIOLOGY

## 2019-06-18 PROCEDURE — 25000003 PHARM REV CODE 250: Performed by: ANESTHESIOLOGY

## 2019-06-18 PROCEDURE — 64635 DESTROY LUMB/SAC FACET JNT: CPT | Performed by: ANESTHESIOLOGY

## 2019-06-18 PROCEDURE — 64636 DESTROY L/S FACET JNT ADDL: CPT | Mod: LT,,, | Performed by: ANESTHESIOLOGY

## 2019-06-18 PROCEDURE — 99152 PR MOD CONSCIOUS SEDATION, SAME PHYS, 5+ YRS, FIRST 15 MIN: ICD-10-PCS | Mod: ,,, | Performed by: ANESTHESIOLOGY

## 2019-06-18 PROCEDURE — 64636 PR DESTROY L/S FACET JNT ADDL: ICD-10-PCS | Mod: LT,,, | Performed by: ANESTHESIOLOGY

## 2019-06-18 PROCEDURE — 64635 PR DESTROY LUMB/SAC FACET JNT: ICD-10-PCS | Mod: LT,,, | Performed by: ANESTHESIOLOGY

## 2019-06-18 RX ORDER — FENTANYL CITRATE 50 UG/ML
INJECTION, SOLUTION INTRAMUSCULAR; INTRAVENOUS
Status: DISCONTINUED | OUTPATIENT
Start: 2019-06-18 | End: 2019-06-18 | Stop reason: HOSPADM

## 2019-06-18 RX ORDER — MIDAZOLAM HYDROCHLORIDE 1 MG/ML
INJECTION INTRAMUSCULAR; INTRAVENOUS
Status: DISCONTINUED | OUTPATIENT
Start: 2019-06-18 | End: 2019-06-18 | Stop reason: HOSPADM

## 2019-06-18 RX ORDER — LIDOCAINE HYDROCHLORIDE 10 MG/ML
INJECTION INFILTRATION; PERINEURAL
Status: DISCONTINUED | OUTPATIENT
Start: 2019-06-18 | End: 2019-06-18 | Stop reason: HOSPADM

## 2019-06-18 RX ORDER — BUPIVACAINE HYDROCHLORIDE 2.5 MG/ML
INJECTION, SOLUTION EPIDURAL; INFILTRATION; INTRACAUDAL
Status: DISCONTINUED | OUTPATIENT
Start: 2019-06-18 | End: 2019-06-18 | Stop reason: HOSPADM

## 2019-06-18 RX ORDER — SODIUM CHLORIDE 9 MG/ML
500 INJECTION, SOLUTION INTRAVENOUS CONTINUOUS
Status: DISCONTINUED | OUTPATIENT
Start: 2019-06-18 | End: 2019-06-18 | Stop reason: HOSPADM

## 2019-06-18 RX ORDER — OXYCODONE AND ACETAMINOPHEN 10; 325 MG/1; MG/1
1 TABLET ORAL EVERY 8 HOURS PRN
Qty: 90 TABLET | Refills: 0 | Status: SHIPPED | OUTPATIENT
Start: 2019-06-20 | End: 2019-07-16 | Stop reason: SDUPTHER

## 2019-06-18 RX ADMIN — SODIUM CHLORIDE 500 ML: 0.9 INJECTION, SOLUTION INTRAVENOUS at 08:06

## 2019-06-18 NOTE — OP NOTE
Lumbar Medial nerve branch block radiofrequency ablation Under Fluoroscopy     Time-out taken to identify patient and procedure side prior to starting the procedure.     06/18/2019    PROCEDURE: Left radiofrequency ablation of the the medial branch nerves at the   transverse process of  L3, L4, L5 and sacral ala    2)Conscious sedation provided by MD     REASON FOR PROCEDURE: Lumbar spondylosis [M47.816]     PHYSICIAN: Lina Wagner MD     ASSISTANTS: None     MEDICATIONS INJECTED: 0.25% Bupivicaine total 8mL     LOCAL ANESTHETIC USED: Xylocaine 1% 1mL / site     ESTIMATED BLOOD LOSS: None.     COMPLICATIONS: None.     Interval history: Patient reports that he had complete relief of pain for the day of the procedure, we will proceed with the RFA     TECHNIQUE: Laying in a prone position, the patient was prepped and draped in the usual sterile fashion using ChloraPrep and fenestrated drape. The level was determined under fluoroscopic guidance. Local anesthetic was given by going down to the hub of the 27-gauge 1.25in needle and raising a wheel. A 18-gauge 10mm curved active tip needle was introduced to the anatomic local of the medial branch at each of the above levels using fluoroscopy. Then sensory and motor testing was performed to confirm that the needle tips were in the correct location. Then after negative aspiration, 1 mL of 0.25% bupivacaine was injected into each level. This was followed by thermal lesioning at 80 degrees celsius for 90 seconds.     Conscious sedation provided by M.D   The patient was monitored with continuous pulse oximetry, EKG, and intermittent blood pressure monitors. The patient was hemodynamically stable throughout the entire process was responsive to voice, and breathing spontaneously. Supplemental O2 was provided at 2L/min via nasal cannula. Patient was comfortable for the duration of the procedure. (See nurse documentation and case log for sedation time)    There was a total of  2mg IV Midazolam and 50mcg Fentanyl titrated for the procedure    The patient tolerated the procedure well. Was able to move their leg at the knee and ankle at the conclusion of the procedure    The patient was monitored after the procedure. Patient was given post procedure and discharge instructions to follow at home. We will see the patient back in two weeks or the patient may call to inform of status. The patient was discharged in a stable condition

## 2019-06-18 NOTE — DISCHARGE SUMMARY
Discharge Note  Short Stay      SUMMARY     Admit Date: 6/18/2019    Attending Physician: Lina Wagner      Discharge Physician: Lina Wagner      Discharge Date: 6/18/2019 9:23 AM    Procedure(s) (LRB):  ABLATION-RADIOFREQUENCY (Left)    Final Diagnosis: Lumbar spondylosis [M47.816]    Disposition: Home or self care    Patient Instructions:   Current Discharge Medication List      START taking these medications    Details   oxyCODONE-acetaminophen (PERCOCET)  mg per tablet Take 1 tablet by mouth every 8 (eight) hours as needed for Pain.  Qty: 90 tablet, Refills: 0         CONTINUE these medications which have NOT CHANGED    Details   aspirin (ECOTRIN) 81 MG EC tablet Take 1 tablet by mouth every morning. prevents heart attacks and strokes      atorvastatin (LIPITOR) 20 MG tablet Take 1 tablet (20 mg total) by mouth once daily.  Qty: 90 tablet, Refills: 3    Associated Diagnoses: Hyperlipidemia, unspecified hyperlipidemia type      !! blood sugar diagnostic Strp Freestyle light strips and lancets  Qty: 100 each, Refills: 6    Associated Diagnoses: Uncontrolled type 2 diabetes mellitus without complication, with long-term current use of insulin      !! blood sugar diagnostic Strp Check glucose four times daily Strips and lancets covered by insurance E11.9 - One touch  Qty: 120 each, Refills: 6      blood-glucose meter kit Check glucose four times daily E11.9 meter covered by insurance One Touch  Qty: 1 each, Refills: 0      candesartan (ATACAND) 4 MG tablet Take 1 tablet (4 mg total) by mouth once daily.  Qty: 30 tablet, Refills: 3      celecoxib (CELEBREX) 200 MG capsule Take 1 capsule (200 mg total) by mouth once daily.  Qty: 30 capsule, Refills: 1    Associated Diagnoses: Chronic pain of both knees      diclofenac sodium (VOLTAREN) 1 % Gel Apply 2 g topically once daily.  Qty: 1 Tube, Refills: 3      FLUoxetine 40 MG capsule Take 1 capsule (40 mg total) by mouth once daily.  Qty: 90 capsule, Refills:  "3      fluticasone (FLONASE) 50 mcg/actuation nasal spray 1 spray by Each Nare route 2 (two) times daily as needed for Rhinitis.  Qty: 15 g, Refills: 0      insulin detemir U-100 (LEVEMIR FLEXPEN) 100 unit/mL (3 mL) SubQ InPn pen Inject 40 units every evening  Qty: 36 mL, Refills: 6    Associated Diagnoses: Uncontrolled type 2 diabetes mellitus without complication, with long-term current use of insulin      insulin lispro (HUMALOG KWIKPEN INSULIN) 100 unit/mL pen INJECT 11 UNITS SUBCUTANEOUSLY BEFORE MEAL(S) PLUS  SLIDING  SCALE max 63 Units per day  Qty: 1 Box, Refills: 6      lisinopril (PRINIVIL,ZESTRIL) 2.5 MG tablet TAKE 1 TABLET BY MOUTH DAILY FOR PROTEINURIA  Refills: 10      metFORMIN (GLUCOPHAGE-XR) 500 MG 24 hr tablet Take 2 tablets (1,000 mg total) by mouth 2 (two) times daily.  Qty: 360 tablet, Refills: 3    Associated Diagnoses: Uncontrolled type 2 diabetes mellitus without complication, with long-term current use of insulin      MULTIVIT-IRON-MIN-FOLIC ACID 3,500-18-0.4 UNIT-MG-MG ORAL CHEW Take 1 tablet by mouth once daily.      omeprazole (PRILOSEC) 20 MG capsule Take 1 capsule (20 mg total) by mouth every morning.  Qty: 90 capsule, Refills: 3    Associated Diagnoses: Gastroesophageal reflux disease without esophagitis      pen needle, diabetic 33 gauge x 5/32" Ndle 1 application by Misc.(Non-Drug; Combo Route) route 4 (four) times daily with meals and nightly.  Qty: 100 each, Refills: 6      VENTOLIN HFA 90 mcg/actuation inhaler INHALE TWO PUFFS INTO LUNGS EVERY 4 TO 6 HOURS AS NEEDED FOR SHORTNESS OF BREATH AND FOR WHEEZING  Qty: 18 each, Refills: 0    Comments: Please consider 90 day supplies to promote better adherence  Associated Diagnoses: Asthma, well controlled, mild intermittent      vitamin D 185 MG Tab Take 5,000 mg by mouth once daily.       !! - Potential duplicate medications found. Please discuss with provider.              Discharge Diagnosis: Lumbar spondylosis [M47.816]  Condition " on Discharge: Stable with no complications to procedure   Diet on Discharge: Same as before.  Activity: as per instruction sheet.  Discharge to: Home with a responsible adult.  Follow up: 2-4 weeks

## 2019-06-18 NOTE — DISCHARGE INSTRUCTIONS
Adult Procedural Sedation Instructions    Recovery After Procedural Sedation (Adult)  You have been given medicine by vein to make you sleep during your surgery. This may have included both a pain medicine and sleeping medicine. Most of the effects have worn off. But you may still have some drowsiness for the next 6 to 8 hours.  Home care  Follow these guidelines when you get home:  · For the next 8 hours, you should be watched by a responsible adult. This person should make sure your condition is not getting worse.  · Don't drink any alcohol for the next 24 hours.  · Don't drive, operate dangerous machinery, or make important business or personal decisions during the next 24 hours.  Note: Your healthcare provider may tell you not to take any medicine by mouth for pain or sleep in the next 4 hours. These medicines may react with the medicines you were given in the hospital. This could cause a much stronger response than usual.  Follow-up care  Follow up with your healthcare provider if you are not alert and back to your usual level of activity within 12 hours.  When to seek medical advice  Call your healthcare provider right away if any of these occur:  · Drowsiness gets worse  · Weakness or dizziness gets worse  · Repeated vomiting  · You can't be awakened   Date Last Reviewed: 10/18/2016  © 0792-1678 [employer]. [address] [place] [address], [place] [address]. All rights reserved. This information is not intended as a substitute for professional medical care. Always follow your healthcare professional's instructions.       Thank you for allowing us to care for you today. You may receive a survey about the care we provided. Your feedback is valuable and helps us provide excellent care throughout the community.     Home Care Instructions for Pain Management:    1. DIET:   You may resume your normal diet today.   2. BATHING:   You may shower with luke warm water. No tub baths or anything that will soak  injection sites under water for the next 24 hours.  3. DRESSING:   You may remove your bandage today.   4. ACTIVITY LEVEL:   You may resume your normal activities 24 hrs after your procedure. Nothing strenuous today.  5. MEDICATIONS:   You may resume your normal medications today. To restart blood thinners, ask your doctor.  6. DRIVING    If you have received any sedatives by mouth today, you may not drive for 12 hours.    If you have received any sedation through your IV, you may not drive for 24 hrs.   7. SPECIAL INSTRUCTIONS:   No heat to the injection site for 24 hrs including, hot bath or shower, heating pad, moist heat, or hot tubs.    Use ice pack to injection site for any pain or discomfort.  Apply ice packs for 20 minute intervals as needed.    IF you have diabetes, be sure to monitor your blood sugar more closely. IF your injection contained steroids your blood sugar levels may become higher than normal.    If you are still having pain upon discharge:  Your pain may improve over the next 48 hours. The anesthetic (numbing medication) works immediately to 48 hours. IF your injection contained a steroid (anti-inflammatory medication), it takes approximately 3 days to start feeling relief and 7-10 days to see your greatest results from the medication. It is possible you may need subsequent injections. This would be discussed at your follow up appointment with pain management or your referring doctor.      PLEASE CALL YOUR DOCTOR IF:  1. Redness or swelling around the injection site.  2. Fever of 101 degrees or more  3. Drainage (pus) from the injection site.  4. For any continuous bleeding (some dried blood over the incision is normal.)    FOR EMERGENCIES:   If any unusual problems or difficulties occur during clinic hours, call [phone] or [phone].

## 2019-07-09 ENCOUNTER — HOSPITAL ENCOUNTER (OUTPATIENT)
Facility: OTHER | Age: 54
Discharge: HOME | End: 2019-07-09
Attending: ANESTHESIOLOGY | Admitting: ANESTHESIOLOGY
Payer: MEDICARE

## 2019-07-09 VITALS
WEIGHT: 293 LBS | SYSTOLIC BLOOD PRESSURE: 126 MMHG | RESPIRATION RATE: 18 BRPM | DIASTOLIC BLOOD PRESSURE: 77 MMHG | BODY MASS INDEX: 48.82 KG/M2 | HEART RATE: 70 BPM | TEMPERATURE: 99 F | OXYGEN SATURATION: 97 % | HEIGHT: 65 IN

## 2019-07-09 DIAGNOSIS — M47.816 LUMBAR SPONDYLOSIS: Primary | ICD-10-CM

## 2019-07-09 DIAGNOSIS — G89.4 CHRONIC PAIN SYNDROME: ICD-10-CM

## 2019-07-09 DIAGNOSIS — G89.29 CHRONIC PAIN: ICD-10-CM

## 2019-07-09 LAB — POCT GLUCOSE: 111 MG/DL (ref 70–110)

## 2019-07-09 PROCEDURE — 64635 DESTROY LUMB/SAC FACET JNT: CPT | Performed by: ANESTHESIOLOGY

## 2019-07-09 PROCEDURE — 64635 DESTROY LUMB/SAC FACET JNT: CPT | Mod: RT,,, | Performed by: ANESTHESIOLOGY

## 2019-07-09 PROCEDURE — 63600175 PHARM REV CODE 636 W HCPCS: Performed by: ANESTHESIOLOGY

## 2019-07-09 PROCEDURE — 64636 PR DESTROY L/S FACET JNT ADDL: ICD-10-PCS | Mod: RT,,, | Performed by: ANESTHESIOLOGY

## 2019-07-09 PROCEDURE — 64636 DESTROY L/S FACET JNT ADDL: CPT | Mod: RT,,, | Performed by: ANESTHESIOLOGY

## 2019-07-09 PROCEDURE — 99152 PR MOD CONSCIOUS SEDATION, SAME PHYS, 5+ YRS, FIRST 15 MIN: ICD-10-PCS | Mod: ,,, | Performed by: ANESTHESIOLOGY

## 2019-07-09 PROCEDURE — 64636 DESTROY L/S FACET JNT ADDL: CPT | Performed by: ANESTHESIOLOGY

## 2019-07-09 PROCEDURE — 99152 MOD SED SAME PHYS/QHP 5/>YRS: CPT | Mod: ,,, | Performed by: ANESTHESIOLOGY

## 2019-07-09 PROCEDURE — 25000003 PHARM REV CODE 250: Performed by: STUDENT IN AN ORGANIZED HEALTH CARE EDUCATION/TRAINING PROGRAM

## 2019-07-09 PROCEDURE — 64635 PR DESTROY LUMB/SAC FACET JNT: ICD-10-PCS | Mod: RT,,, | Performed by: ANESTHESIOLOGY

## 2019-07-09 PROCEDURE — 25000003 PHARM REV CODE 250: Performed by: ANESTHESIOLOGY

## 2019-07-09 RX ORDER — MIDAZOLAM HYDROCHLORIDE 1 MG/ML
INJECTION INTRAMUSCULAR; INTRAVENOUS
Status: DISCONTINUED | OUTPATIENT
Start: 2019-07-09 | End: 2019-07-09 | Stop reason: HOSPADM

## 2019-07-09 RX ORDER — BUPIVACAINE HYDROCHLORIDE 2.5 MG/ML
INJECTION, SOLUTION EPIDURAL; INFILTRATION; INTRACAUDAL
Status: DISCONTINUED | OUTPATIENT
Start: 2019-07-09 | End: 2019-07-09 | Stop reason: HOSPADM

## 2019-07-09 RX ORDER — SODIUM CHLORIDE 9 MG/ML
500 INJECTION, SOLUTION INTRAVENOUS CONTINUOUS
Status: DISCONTINUED | OUTPATIENT
Start: 2019-07-09 | End: 2019-07-09 | Stop reason: HOSPADM

## 2019-07-09 RX ORDER — LIDOCAINE HYDROCHLORIDE 10 MG/ML
INJECTION INFILTRATION; PERINEURAL
Status: DISCONTINUED | OUTPATIENT
Start: 2019-07-09 | End: 2019-07-09 | Stop reason: HOSPADM

## 2019-07-09 RX ORDER — FENTANYL CITRATE 50 UG/ML
INJECTION, SOLUTION INTRAMUSCULAR; INTRAVENOUS
Status: DISCONTINUED | OUTPATIENT
Start: 2019-07-09 | End: 2019-07-09 | Stop reason: HOSPADM

## 2019-07-09 RX ADMIN — SODIUM CHLORIDE 500 ML: 0.9 INJECTION, SOLUTION INTRAVENOUS at 08:07

## 2019-07-09 NOTE — DISCHARGE SUMMARY
Discharge Note  Short Stay      SUMMARY     Admit Date: 7/9/2019    Attending Physician: Dillon Jones      Discharge Physician: Dillon Jones      Discharge Date: 7/9/2019 10:05 AM    Procedure(s) (LRB):  RADIOFREQUENCY ABLATION (Right)    Final Diagnosis: Lumbar spondylosis [M47.816]    Disposition: Home or self care    Patient Instructions:   Current Discharge Medication List      CONTINUE these medications which have NOT CHANGED    Details   aspirin (ECOTRIN) 81 MG EC tablet Take 1 tablet by mouth every morning. prevents heart attacks and strokes      atorvastatin (LIPITOR) 20 MG tablet Take 1 tablet (20 mg total) by mouth once daily.  Qty: 90 tablet, Refills: 3    Associated Diagnoses: Hyperlipidemia, unspecified hyperlipidemia type      !! blood sugar diagnostic Strp Freestyle light strips and lancets  Qty: 100 each, Refills: 6    Associated Diagnoses: Uncontrolled type 2 diabetes mellitus without complication, with long-term current use of insulin      !! blood sugar diagnostic Strp Check glucose four times daily Strips and lancets covered by insurance E11.9 - One touch  Qty: 120 each, Refills: 6      blood-glucose meter kit Check glucose four times daily E11.9 meter covered by insurance One Touch  Qty: 1 each, Refills: 0      candesartan (ATACAND) 4 MG tablet Take 1 tablet (4 mg total) by mouth once daily.  Qty: 30 tablet, Refills: 3      celecoxib (CELEBREX) 200 MG capsule Take 1 capsule (200 mg total) by mouth once daily.  Qty: 30 capsule, Refills: 1    Associated Diagnoses: Chronic pain of both knees      diclofenac sodium (VOLTAREN) 1 % Gel Apply 2 g topically once daily.  Qty: 1 Tube, Refills: 3      FLUoxetine 40 MG capsule Take 1 capsule (40 mg total) by mouth once daily.  Qty: 90 capsule, Refills: 3      fluticasone (FLONASE) 50 mcg/actuation nasal spray 1 spray by Each Nare route 2 (two) times daily as needed for Rhinitis.  Qty: 15 g, Refills: 0      insulin detemir U-100 (LEVEMIR FLEXPEN) 100  "unit/mL (3 mL) SubQ InPn pen Inject 40 units every evening  Qty: 36 mL, Refills: 6    Associated Diagnoses: Uncontrolled type 2 diabetes mellitus without complication, with long-term current use of insulin      insulin lispro (HUMALOG KWIKPEN INSULIN) 100 unit/mL pen INJECT 11 UNITS SUBCUTANEOUSLY BEFORE MEAL(S) PLUS  SLIDING  SCALE max 63 Units per day  Qty: 1 Box, Refills: 6      lisinopril (PRINIVIL,ZESTRIL) 2.5 MG tablet TAKE 1 TABLET BY MOUTH DAILY FOR PROTEINURIA  Refills: 10      metFORMIN (GLUCOPHAGE-XR) 500 MG 24 hr tablet Take 2 tablets (1,000 mg total) by mouth 2 (two) times daily.  Qty: 360 tablet, Refills: 3    Associated Diagnoses: Uncontrolled type 2 diabetes mellitus without complication, with long-term current use of insulin      MULTIVIT-IRON-MIN-FOLIC ACID 3,500-18-0.4 UNIT-MG-MG ORAL CHEW Take 1 tablet by mouth once daily.      omeprazole (PRILOSEC) 20 MG capsule Take 1 capsule (20 mg total) by mouth every morning.  Qty: 90 capsule, Refills: 3    Associated Diagnoses: Gastroesophageal reflux disease without esophagitis      oxyCODONE-acetaminophen (PERCOCET)  mg per tablet Take 1 tablet by mouth every 8 (eight) hours as needed for Pain.  Qty: 90 tablet, Refills: 0      pen needle, diabetic 33 gauge x 5/32" Ndle 1 application by Misc.(Non-Drug; Combo Route) route 4 (four) times daily with meals and nightly.  Qty: 100 each, Refills: 6      VENTOLIN HFA 90 mcg/actuation inhaler INHALE TWO PUFFS INTO LUNGS EVERY 4 TO 6 HOURS AS NEEDED FOR SHORTNESS OF BREATH AND FOR WHEEZING  Qty: 18 each, Refills: 0    Comments: Please consider 90 day supplies to promote better adherence  Associated Diagnoses: Asthma, well controlled, mild intermittent      vitamin D 185 MG Tab Take 5,000 mg by mouth once daily.       !! - Potential duplicate medications found. Please discuss with provider.              Discharge Diagnosis: Lumbar spondylosis [M47.816]  Condition on Discharge: Stable with no complications to " procedure   Diet on Discharge: Same as before.  Activity: as per instruction sheet.  Discharge to: Home with a responsible adult.  Follow up: 2-4 weeks       Please call my office or pager at [phone] if experienced any weakness or loss of sensation, fever > 101.5, pain uncontrolled with oral medications, persistent nausea/vomiting/or diarrhea, redness or drainage from the incisions, or any other worrisome concerns. If physician on call was not reached or could not communicate with our office for any reason please go to the nearest emergency department

## 2019-07-09 NOTE — H&P
HPI: Ms. Thomas is a 52 yo F with PMHx of lumbar spondylosis who presents for below procedure.   Patient presenting for Procedure(s) (LRB):  RADIOFREQUENCY ABLATION (Right)     Patient on Anti-coagulation No    No health changes since previous encounter    Past Medical History:   Diagnosis Date    Allergy     Anemia     Asthma     Depression 1/28/2019    Diabetes mellitus type II     DJD (degenerative joint disease) of knee 6/19/2014    Facet arthritis of lumbar region 12/17/2015    Facet syndrome 12/17/2015    GERD (gastroesophageal reflux disease)     Heartburn     Hyperlipidemia     Hypertension     Morbid obesity     Neuromuscular disorder     Sacroiliitis 6/13/2018    Sleep apnea      Past Surgical History:   Procedure Laterality Date    ABLATION-RADIOFREQUENCY Left 6/18/2019    [facility]formed [facility] at [facility]    BLOCK, NERVE, INTERCOSTAL, SINGLE Left 4/2/2014    [facility]    BLOCK-NERVE-MEDIAL BRANCH-LUMBAR Bilateral 3/1/2016    [facility]    BLOCK-NERVE-MEDIAL BRANCH-LUMBAR Bilateral 2/16/2016    [facility] by [facility] at [facility]    CARPAL TUNNEL RELEASE      CARPAL TUNNEL RELEASE  1980s    left    CARPAL TUNNEL RELEASE  2012    right    EGD (ESOPHAGOGASTRODUODENOSCOPY) N/A 3/27/2019    Per[facility] (2ND FLR)    ESOPHAGOGASTRODUODENOSCOPY (EGD) N/A 12/19/2016    Performed by Gulshan Stroud [facility] at [facility] ENDO (2ND FLR)    JOINT REPLACEMENT Bilateral     with 2 revisions on rt    KNEE SURGERY  3/2010    orthroscope    KNEE SURGERY  6-19-14    left TKR    Radiofrequency Ablation LEFT L2,3,4,5 RFA Left 12/27/2018    Performed by [facility] at [facility]    Radiofrequency Ablation RIGHT LUMBAR L2,3,4,5 RFA Right 1/29/2019    Performed by [facility] at [facility]    RADIOFREQUENCY ABLATION, NERVE,  MEDIAL BRANCH, LUMBAR, 1 LEVEL Right 7/10/2018    [facility]    RADIOFREQUENCY ABLATION, NERVE, MEDIAL BRANCH, LUMBAR, 1 LEVEL Left 6/26/2018    [facility] by [facility] at [facility]    RADIOFREQUENCY THERMOCOAGULATION (RFTC)-NERVE-MEDIAN BRANCH-LUMBAR Right 1/9/2018    Performed by [facility] at [facility]    RADIOFREQUENCY THERMOCOAGULATION (RFTC)-NERVE-MEDIAN BRANCH-LUMBAR Left 12/26/2017    [facility] at [facility]    RADIOFREQUENCY THERMOCOAGULATION (RFTC)-NERVE-MEDIAN BRANCH-LUMBAR Right 10/19/2016    [facility] at [facility]    RADIOFREQUENCY THERMOCOAGULATION (RFTC)-NERVE-MEDIAN BRANCH-LUMBAR Left 10/5/2016    Per[facility] by [facility] at [facility]    RADIOFREQUENCY THERMOCOAGULATION (RFTC)-NERVE-MEDIAN BRANCH-LUMBAR Right 4/20/2016    [facility] at [facility]    RADIOFREQUENCY THERMOCOAGULATION (RFTC)-NERVE-MEDIAN BRANCH-LUMBAR Left 4/6/2016    Performed by [facility] at [facility]    RADIOFREQUENCY THERMOCOAGULATION (RFTC)-NERVE-MEDIAN BRANCH-LUMBAR/COOLED Right 5/9/2017    Performed by [facility] at [facility]    RADIOFREQUENCY THERMOCOAGULATION (RFTC)-NERVE-MEDIAN BRANCH-LUMBAR/COOLED Left 4/26/2017    Performed by [facility] at [facility]    RELEASE, TRIGGER FINGER Right 2/4/2013    [facility]formed by Vel[facility] at [facility] 1ST FLR    REPLACEMENT-KNEE-TOTAL Left 6/19/2014    Performed by Theodore Swan MD at [facility] OR 2ND FLR    Revision Carpal Tunnel-Right Right 12/14/2015    [facility] at [facility] OR    REVISION-ARTHROPLASTY-KNEE-TOTAL Right 11/4/2014    Performed by Theodore Swan MD at [facility] OR 2ND FLR     Review of patient's allergies indicates:   Allergen Reactions    Strawberry Anaphylaxis      Current Facility-Administered Medications    Medication    0.9%  NaCl infusion       PMHx, PSHx, Allergies, Medications reviewed in epic    ROS negative except pain complaints in HPI    OBJECTIVE:    There were no vitals taken for this visit.    PHYSICAL EXAMINATION:    GENERAL: Well appearing, in no acute distress, alert and oriented x3.  PSYCH:  Mood and affect appropriate.  SKIN: Skin color, texture, turgor normal, no rashes or lesions which will impact the procedure.  CV: RRR with palpation of the radial artery.  PULM: No evidence of respiratory difficulty, symmetric chest rise. Clear to auscultation.  NEURO: Cranial nerves grossly intact.    Plan:    Proceed with procedure as planned Procedure(s) (LRB):  RADIOFREQUENCY ABLATION (Right)    Chung Lu MD  07/09/2019

## 2019-07-09 NOTE — OP NOTE
Lumbar Medial nerve branch block radiofrequency ablation Under Fluoroscopy     Time-out taken to identify patient and procedure side prior to starting the procedure.     07/09/2019    PROCEDURE: Right radiofrequency ablation of the the medial branch nerves at the   transverse process of  L3, L4, L5 and sacral ala    2)Conscious sedation provided by MD     REASON FOR PROCEDURE: Lumbar spondylosis [M47.816]     PHYSICIAN: Lina Wagner MD     ASSISTANTS: None     MEDICATIONS INJECTED: 0.25% Bupivicaine total 8mL     LOCAL ANESTHETIC USED: Xylocaine 1% 1mL / site     ESTIMATED BLOOD LOSS: None.     COMPLICATIONS: None.     Interval history: Patient reports that he had complete relief of pain for the day of the procedure, we will proceed with the RFA     TECHNIQUE: Laying in a prone position, the patient was prepped and draped in the usual sterile fashion using ChloraPrep and fenestrated drape. The level was determined under fluoroscopic guidance. Local anesthetic was given by going down to the hub of the 27-gauge 1.25in needle and raising a wheel. A 18-gauge 10mm curved active tip needle was introduced to the anatomic local of the medial branch at each of the above levels using fluoroscopy. Then sensory and motor testing was performed to confirm that the needle tips were in the correct location. Then after negative aspiration, 1 mL of 0.25% bupivacaine was injected into each level. This was followed by thermal lesioning at 80 degrees celsius for 90 seconds.     Conscious sedation provided by M.D   The patient was monitored with continuous pulse oximetry, EKG, and intermittent blood pressure monitors. The patient was hemodynamically stable throughout the entire process was responsive to voice, and breathing spontaneously. Supplemental O2 was provided at 2L/min via nasal cannula. Patient was comfortable for the duration of the procedure. (See nurse documentation and case log for sedation time)    There was a total of  2mg IV Midazolam and 50mcg Fentanyl titrated for the procedure    The patient tolerated the procedure well. Did not have any procedure related motor deficit at the conclusion of the procedure    The patient was monitored after the procedure. Patient was given post procedure and discharge instructions to follow at home. We will see the patient back in two weeks or the patient may call to inform of status. The patient was discharged in a stable condition

## 2019-07-09 NOTE — DISCHARGE INSTRUCTIONS
Thank you for allowing us to care for you today. You may receive a survey about the care we provided. Your feedback is valuable and helps us provide excellent care throughout the community.     Home Care Instructions for Pain Management:    1. DIET:   You may resume your normal diet today.   2. BATHING:   You may shower with luke warm water. No tub baths or anything that will soak injection sites under water for the next 24 hours.  3. DRESSING:   You may remove your bandage today.   4. ACTIVITY LEVEL:   You may resume your normal activities 24 hrs after your procedure. Nothing strenuous today.  5. MEDICATIONS:   You may resume your normal medications today. To restart blood thinners, ask your doctor.  6. DRIVING    If you have received any sedatives by mouth today, you may not drive for 12 hours.    If you have received any sedation through your IV, you may not drive for 24 hrs.   7. SPECIAL INSTRUCTIONS:   No heat to the injection site for 24 hrs including, hot bath or shower, heating pad, moist heat, or hot tubs.    Use ice pack to injection site for any pain or discomfort.  Apply ice packs for 20 minute intervals as needed.    IF you have diabetes, be sure to monitor your blood sugar more closely. IF your injection contained steroids your blood sugar levels may become higher than normal.    If you are still having pain upon discharge:  Your pain may improve over the next 48 hours. The anesthetic (numbing medication) works immediately to 48 hours. IF your injection contained a steroid (anti-inflammatory medication), it takes approximately 3 days to start feeling relief and 7-10 days to see your greatest results from the medication. It is possible you may need subsequent injections. This would be discussed at your follow up appointment with pain management or your referring doctor.      PLEASE CALL YOUR DOCTOR IF:  1. Redness or swelling around the injection site.  2. Fever of 101 degrees or more  3. Drainage  (pus) from the injection site.  4. For any continuous bleeding (some dried blood over the incision is normal.)    FOR EMERGENCIES:   If any unusual problems or difficulties occur during clinic hours, call [phone] or [phone]. Adult Procedural Sedation Instructions    Recovery After Procedural Sedation (Adult)  You have been given medicine by vein to make you sleep during your surgery. This may have included both a pain medicine and sleeping medicine. Most of the effects have worn off. But you may still have some drowsiness for the next 6 to 8 hours.  Home care  Follow these guidelines when you get home:  · For the next 8 hours, you should be watched by a responsible adult. This person should make sure your condition is not getting worse.  · Don't drink any alcohol for the next 24 hours.  · Don't drive, operate dangerous machinery, or make important business or personal decisions during the next 24 hours.  Note: Your healthcare provider may tell you not to take any medicine by mouth for pain or sleep in the next 4 hours. These medicines may react with the medicines you were given in the hospital. This could cause a much stronger response than usual.  Follow-up care  Follow up with your healthcare provider if you are not alert and back to your usual level of activity within 12 hours.  When to seek medical advice  Call your healthcare provider right away if any of these occur:  · Drowsiness gets worse  · Weakness or dizziness gets worse  · Repeated vomiting  · You can't be awakened   Date Last Reviewed: 10/18/2016  © 4744-9607 [employer]. [address] [place] [address], [place] [address]. All rights reserved. This information is not intended as a substitute for professional medical care. Always follow your healthcare professional's instructions.

## 2019-07-11 ENCOUNTER — TELEPHONE (OUTPATIENT)
Dept: ORTHOPEDICS | Facility: CLINIC | Age: 54
End: 2019-07-11

## 2019-07-11 DIAGNOSIS — R52 PAIN: Primary | ICD-10-CM

## 2019-07-11 NOTE — TELEPHONE ENCOUNTER
----- Message from Eder Hood sent at 7/10/2019  3:58 PM CDT -----  Contact: Patient   Patient would like to make an appointment for her hand. Her thumb pops back and fourth. Patient would like to be seen this month . I attempted to make the appointment but the soonest appointment was in September     [phone]    Thank you

## 2019-07-15 ENCOUNTER — PATIENT OUTREACH (OUTPATIENT)
Dept: ADMINISTRATIVE | Facility: OTHER | Age: 54
End: 2019-07-15

## 2019-07-16 ENCOUNTER — OFFICE VISIT (OUTPATIENT)
Dept: PAIN MEDICINE | Facility: CLINIC | Age: 54
End: 2019-07-16
Payer: MEDICARE

## 2019-07-16 VITALS
DIASTOLIC BLOOD PRESSURE: 86 MMHG | TEMPERATURE: 98 F | SYSTOLIC BLOOD PRESSURE: 116 MMHG | RESPIRATION RATE: 18 BRPM | BODY MASS INDEX: 48.82 KG/M2 | WEIGHT: 293 LBS | HEART RATE: 75 BPM | HEIGHT: 65 IN

## 2019-07-16 DIAGNOSIS — M47.816 FACET ARTHRITIS OF LUMBAR REGION: ICD-10-CM

## 2019-07-16 DIAGNOSIS — M47.819 OSTEOARTHRITIS OF SPINE WITHOUT MYELOPATHY OR RADICULOPATHY, UNSPECIFIED SPINAL REGION: ICD-10-CM

## 2019-07-16 DIAGNOSIS — M51.36 DDD (DEGENERATIVE DISC DISEASE), LUMBAR: ICD-10-CM

## 2019-07-16 DIAGNOSIS — Z79.891 ENCOUNTER FOR LONG-TERM OPIATE ANALGESIC USE: ICD-10-CM

## 2019-07-16 DIAGNOSIS — M47.816 LUMBAR SPONDYLOSIS: Primary | ICD-10-CM

## 2019-07-16 PROCEDURE — 99213 OFFICE O/P EST LOW 20 MIN: CPT | Mod: PBBFAC | Performed by: NURSE PRACTITIONER

## 2019-07-16 PROCEDURE — 99024 POSTOP FOLLOW-UP VISIT: CPT | Mod: S$PBB,,, | Performed by: NURSE PRACTITIONER

## 2019-07-16 PROCEDURE — 99999 PR PBB SHADOW E&M-EST. PATIENT-LVL III: CPT | Mod: PBBFAC,,, | Performed by: NURSE PRACTITIONER

## 2019-07-16 PROCEDURE — 99999 PR PBB SHADOW E&M-EST. PATIENT-LVL III: ICD-10-PCS | Mod: PBBFAC,,, | Performed by: NURSE PRACTITIONER

## 2019-07-16 PROCEDURE — 99024 PR POST-OP FOLLOW-UP VISIT: ICD-10-PCS | Mod: S$PBB,,, | Performed by: NURSE PRACTITIONER

## 2019-07-16 RX ORDER — OXYCODONE AND ACETAMINOPHEN 10; 325 MG/1; MG/1
1 TABLET ORAL EVERY 8 HOURS PRN
Qty: 90 TABLET | Refills: 0 | Status: ON HOLD | OUTPATIENT
Start: 2019-07-18 | End: 2019-07-29 | Stop reason: HOSPADM

## 2019-07-16 NOTE — PROGRESS NOTES
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 none    Pain Medications: Percocet and celebrex    Blood thinners: ASA 81 mg daily     Interventional Therapies:   steroid inj and visco-supplementation (series of 3) in left knee- not helpful  3/31/14 Bilateral genicular nerve block- significant relief of left knee, limited relief of right knee pain  2/16/16 Bilateral L2,3,4,5 MBB  3/1/16 Bilateral L2,3,4,5 MBB   4/6/16 Left L2,3,4,5 RFA- significant relief  4/20/16 Right L2,3,4,5 RFA- significant relief  10/5/16 Left L2,3,4,5 cooled RFA  10/19/16 Right L2,3,4,5 cooled RFA  4/26/17 Left L2,3,4,5 cooled RFA  5/9/17 Right L2,3,4,5 cooled RFA  12/26/2017- Left L2,3,4,5 cooled RFA  1/9/2018- Right L2,3,4,5 cooled RFA  6/26/18 Left L2,3,4,5 cooled RFA- 60% relief  7/10/18 Right L2,3,4,5 cooled RFA- 60% relief  9/28/18 TPIs- significant relief  12/27/18 Left L2,3,4,5 RFA- 70% relief  1/29/19 Right L2,3,4,5 RFA- 70% relief  6/18/19 Left L2,3,4,5 RFA- 70% relief  7/9/19 Right L2,3,4,5 RFA- 50% relief      Relevant Surgeries: right knee surgery x3 (arthroscopic, then replacement and subsequent revision surgery), s/p left total knee arthroplasty    Relevant Imaging:  Xray Lumbar spine 09/21/2015  Lumbar spine radiograph    Comparison: None    Results: AP, lateral neutral, lateral flexion , lateral extension, bilateral oblique and spot views. The  alignment of the lumbar spine demonstrates a mild levoscoliosis . 11-mm anterior listhesis of L4 relative to L5 with no translational abnormalities  seen on flexion and extension views. The vertebral body heights are well-maintained , mild disk space narrowing L4-L5 and L5-S1. Mild anterior and marginal osteophyte formation seen throughout the lumbar spine . The oblique views demonstrate no   definite spondylolysis. There is facet joint osseous hypertrophy noted at L3-L4 and L4-L5.      Impression         The Significant spondylosis of the lumbar spine with grade 1 anterior listhesis L4 relative to L5.  Facet joint osseous hypertrophy L3-L4 and L4-5.      Electronically signed by: BLESSING ROE MD  Date: 09/21/15  Time: 09:56          X-ray knee bilateral 8/26/2015:  Standing AP knees, lateral view of both knees and sunrise view of both patella. Study compared to May 2015. Postop change of bilateral knee replacement. The prosthetic components are in satisfactory position. Erosive changes involving the patella   again evident bilaterally.    Impression no significant change.      Xray Bilateral Knee 05/25/2015:  There are bilateral total knee arthroplasties with posterior resurfacing of the patella. As observed on 12/17/2014 there is a fracture of the left patella in the parasagittal plane.    Heterotopic bone is evident about each knee.    I detect no dislocation, unusual radiopaque retained foreign body, lytic or blastic lesion, or chondrocalcinosis.    Cervical MRI 4/24/2018:    Narrative     EXAMINATION:  MRI CERVICAL SPINE WITHOUT CONTRAST    CLINICAL HISTORY:  Neck pain, first study;.  Cervicalgia.    TECHNIQUE:  Multiplanar, multisequence MR images of the cervical spine were acquired without the administration of contrast.    COMPARISON:  None.    FINDINGS:  There is reversal of the normal cervical lordosis which could be related to patient positioning versus muscle spasm.    Cervical vertebral body  heights are well maintained without evidence of acute fracture or dislocation.  Marrow signal is unremarkable.    Spinal canal contents are unremarkable.  No abnormal masses or collections.    Individual levels as detailed below:    C2-3: No significant spinal canal stenosis or neuroforaminal narrowing.    C3-4: Facet arthropathy.  No significant spinal canal stenosis or neuroforaminal narrowing.    C4-5: Facet arthropathy.  Small broad-based disc osteophyte complex.  Mild right neuroforaminal narrowing.  Mild spinal canal stenosis.    C5-6: Facet arthropathy.  Uncovertebral joint spurring.  Broad-based posterior disc osteophyte complex.  Mild right neuroforaminal narrowing.  Mild spinal canal stenosis.    C6-7: Facet arthropathy.  No significant spinal canal stenosis or neuroforaminal narrowing.    C7-T1: No significant spinal canal stenosis or neuroforaminal narrowing.    Paraspinal muscles are unremarkable.  Soft tissues are intact.   Impression       Mild multilevel cervical spondylosis with mild spinal canal stenosis and mild right neuroforaminal narrowing at C4-5 and C5-6.       Lab Results   Component Value Date    HGBA1C 7.5 (H) 01/23/2019     Lab Results   Component Value Date    CHOL 161 01/23/2019    CHOL 152 05/17/2018    CHOL 191 05/09/2017     Lab Results   Component Value Date    HDL 51 01/23/2019    HDL 50 05/17/2018    HDL 48 05/09/2017     Lab Results   Component Value Date    LDLCALC 94.2 01/23/2019    LDLCALC 91.0 05/17/2018    LDLCALC 129.0 05/09/2017     Lab Results   Component Value Date    TRIG 79 01/23/2019    TRIG 55 05/17/2018    TRIG 70 05/09/2017     Lab Results   Component Value Date    CHOLHDL 31.7 01/23/2019    CHOLHDL 32.9 05/17/2018    CHOLHDL 25.1 05/09/2017         Past Medical History:   Diagnosis Date    Allergy     Anemia     Asthma     Depression 1/28/2019    Diabetes mellitus type II     DJD (degenerative joint disease) of knee 6/19/2014    Facet arthritis of lumbar  region 12/17/2015    Facet syndrome 12/17/2015    GERD (gastroesophageal reflux disease)     Heartburn     Hyperlipidemia     Hypertension     Morbid obesity     Neuromuscular disorder     Sacroiliitis 6/13/2018    Sleep apnea      Past Surgical History:   Procedure Laterality Date    ABLATION-RADIOFREQUENCY Left 6/18/2019    [facility]    BLOCK, NERVE, INTERCOSTAL, SINGLE Left 4/2/2014    [facility]    BLOCK-NERVE-MEDIAL BRANCH-LUMBAR Bilateral 3/1/2016    [facility]    BLOCK-NERVE-MEDIAL BRANCH-LUMBAR Bilateral 2/16/2016    [facility]    CARPAL TUNNEL RELEASE      CARPAL TUNNEL RELEASE  1980s    left    CARPAL TUNNEL RELEASE  2012    right    EGD (ESOPHAGOGASTRODUODENOSCOPY) N/A 3/27/2019    [facility] (2ND [facility])    ESOPHAGOGASTRODUODENOSCOPY (EGD) N/A 12/19/2016    [facility] Gulshan [facility]oud [facility] at [facility] (2ND FLR)    JOINT REPLACEMENT Bilateral     with 2 revisions on rt    KNEE SURGERY  3/2010    orthroscope    KNEE SURGERY  6-19-14    left TKR    RADIOFREQUENCY ABLATION Right 7/9/2019    [facility]    Radiofrequency Ablation LEFT L2,3,4,5 RFA Left 12/27/2018    [facility]    Radiofrequency Ablation RIGHT LUMBAR L2,3,4,5 RFA Right 1/29/2019    [facility]    RADIOFREQUENCY ABLATION, NERVE, MEDIAL BRANCH, LUMBAR, 1 LEVEL Right 7/10/2018    [facility]    RADIOFREQUENCY ABLATION, NERVE, MEDIAL BRANCH, LUMBAR, 1 LEVEL Left 6/26/2018    [facility]    RADIOFREQUENCY THERMOCOAGULATION (RFTC)-NERVE-MEDIAN BRANCH-LUMBAR Right 1/9/2018    [facility]     RADIOFREQUENCY THERMOCOAGULATION (RFTC)-NERVE-MEDIAN BRANCH-LUMBAR Left 12/26/2017    [facility]    RADIOFREQUENCY THERMOCOAGULATION (RFTC)-NERVE-MEDIAN BRANCH-LUMBAR Right 10/19/2016    [facility]    RADIOFREQUENCY THERMOCOAGULATION (RFTC)-NERVE-MEDIAN BRANCH-LUMBAR Left 10/5/2016    [facility]    RADIOFREQUENCY THERMOCOAGULATION (RFTC)-NERVE-MEDIAN BRANCH-LUMBAR Right 4/20/2016    [facility]    RADIOFREQUENCY THERMOCOAGULATION (RFTC)-NERVE-MEDIAN BRANCH-LUMBAR Left 4/6/2016    [facility]    RADIOFREQUENCY THERMOCOAGULATION (RFTC)-NERVE-MEDIAN BRANCH-LUMBAR/COOLED Right 5/9/2017    [facility]    RADIOFREQUENCY THERMOCOAGULATION (RFTC)-NERVE-MEDIAN BRANCH-LUMBAR/COOLED Left 4/26/2017    [facility]    RELEASE, TRIGGER FINGER Right 2/4/2013    [facility] at [facility] 1ST FLR    REPLACEMENT-KNEE-TOTAL Left 6/19/2014    [facility]    Revision Carpal Tunnel-Right Right 12/14/2015    [facility]    REVISION-ARTHROPLASTY-KNEE-TOTAL Right 11/4/2014    [facility]     Family History   Problem Relation Age of Onset    Diabetes Mother     Cataracts Mother     Diabetes Father     Cataracts Father     Coronary artery disease Brother     Diabetes Brother     Hypertension Sister     Diabetes Sister     No Known Problems Sister     Cancer Sister         lymphoma     Diabetes Brother     Hypotension Brother     Kidney failure Brother     Hypotension Brother     Amblyopia Neg Hx     Blindness Neg Hx     Glaucoma Neg Hx     Macular degeneration Neg Hx     Retinal detachment  Neg Hx     Strabismus Neg Hx     Stroke Neg Hx     Thyroid disease Neg Hx      Social History     Socioeconomic History    Marital status:      Spouse name: Not on file    Number of children: Not on file    Years of education: Not on file    Highest education level: Not on file   Occupational History    Not on file   Social Needs    Financial resource strain: Not on file    Food insecurity:     Worry: Not on file     Inability: Not on file    Transportation needs:     Medical: Not on file     Non-medical: Not on file   Tobacco Use    Smoking status: Never Smoker    Smokeless tobacco: Never Used   Substance and Sexual Activity    Alcohol use: Yes     Comment: occasionally     Drug use: No    Sexual activity: Yes     Partners: Female     Birth control/protection: None   Lifestyle    Physical activity:     Days per week: Not on file     Minutes per session: Not on file    Stress: Not on file   Relationships    Social connections:     Talks on phone: Not on file     Gets together: Not on file     Attends Religion service: Not on file     Active member of club or organization: Not on file     Attends meetings of clubs or organizations: Not on file     Relationship status: Not on file   Other Topics Concern    Not on file   Social History Narrative    Disabled. The patient is the youngest of 6 siblings. Single. Lives with single-sex partner.                  Review of patient's allergies indicates:  No Known Allergies    Medication List with Changes/Refills   Current Medications    ASPIRIN (ECOTRIN) 81 MG EC TABLET    Take 1 tablet by mouth every morning. prevents heart attacks and strokes    ATORVASTATIN (LIPITOR) 20 MG TABLET    Take 1 tablet (20 mg total) by mouth once daily.    BLOOD SUGAR DIAGNOSTIC STRP    Freestyle light strips and lancets    BLOOD SUGAR DIAGNOSTIC STRP    Check glucose four times daily Strips and lancets covered by insurance E11.9 - One touch    BLOOD-GLUCOSE METER KIT   "  Check glucose four times daily E11.9 meter covered by insurance One Touch    CANDESARTAN (ATACAND) 4 MG TABLET    Take 1 tablet (4 mg total) by mouth once daily.    CELECOXIB (CELEBREX) 200 MG CAPSULE    Take 1 capsule (200 mg total) by mouth once daily.    DICLOFENAC SODIUM (VOLTAREN) 1 % GEL    Apply 2 g topically once daily.    FLUOXETINE 40 MG CAPSULE    Take 1 capsule (40 mg total) by mouth once daily.    FLUTICASONE (FLONASE) 50 MCG/ACTUATION NASAL SPRAY    1 spray by Each Nare route 2 (two) times daily as needed for Rhinitis.    INSULIN DETEMIR U-100 (LEVEMIR FLEXPEN) 100 UNIT/ML (3 ML) SUBQ INPN PEN    Inject 40 units every evening    INSULIN LISPRO (HUMALOG KWIKPEN INSULIN) 100 UNIT/ML PEN    INJECT 11 UNITS SUBCUTANEOUSLY BEFORE MEAL(S) PLUS  SLIDING  SCALE max 63 Units per day    LISINOPRIL (PRINIVIL,ZESTRIL) 2.5 MG TABLET    TAKE 1 TABLET BY MOUTH DAILY FOR PROTEINURIA    METFORMIN (GLUCOPHAGE-XR) 500 MG 24 HR TABLET    Take 2 tablets (1,000 mg total) by mouth 2 (two) times daily.    MULTIVIT-IRON-MIN-FOLIC ACID 3,500-18-0.4 UNIT-MG-MG ORAL CHEW    Take 1 tablet by mouth once daily.    OMEPRAZOLE (PRILOSEC) 20 MG CAPSULE    Take 1 capsule (20 mg total) by mouth every morning.    OXYCODONE-ACETAMINOPHEN (PERCOCET)  MG PER TABLET    Take 1 tablet by mouth every 8 (eight) hours as needed for Pain.    PEN NEEDLE, DIABETIC 33 GAUGE X 5/32" NDLE    1 application by Misc.(Non-Drug; Combo Route) route 4 (four) times daily with meals and nightly.    VENTOLIN HFA 90 MCG/ACTUATION INHALER    INHALE TWO PUFFS INTO LUNGS EVERY 4 TO 6 HOURS AS NEEDED FOR SHORTNESS OF BREATH AND FOR WHEEZING    VITAMIN D 185 MG TAB    Take 5,000 mg by mouth once daily.         REVIEW OF SYSTEMS:    GENERAL:  Patient is attempting to lose weight.  RESPIRATORY:  Negative for cough, wheezing or shortness of breath, patient denies any recent URI.  CARDIOVASCULAR:  Negative for chest pain, leg swelling or palpitations.  GI:  " "Negative for abdominal discomfort, blood in stools or black stools or change in bowel habits, occasional constipation.  MUSCULOSKELETAL:  See HPI.  SKIN:  Negative for lesions, rash, and itching.  PSYCH:  No mood disorder or recent psychosocial stressors.  Patient's sleep is disturbed secondary to pain (patient reports also that she has insomnia).  HEMATOLOGY/LYMPHOLOGY:  Negative for prolonged bleeding, bruising easily or swollen nodes.  81 mg aspirin  ENDO: Patient has a history of diabetes  NEURO:   No history of headaches, syncope, paralysis, seizures or tremors.  All other reviewed and negative other than HPI.    OBJECTIVE:    /86   Pulse 75   Temp 97.9 °F (36.6 °C) (Oral)   Resp 18   Ht 5' 5" (1.651 m)   Wt (!) 157 kg (346 lb 2 oz)   BMI 57.60 kg/m²     PHYSICAL EXAMINATION:    GENERAL: Well appearing, in no acute distress, alert and oriented x3.   PSYCH:  Mood and affect appropriate.  SKIN: Skin color, texture, turgor normal, no rashes or lesions.  HEAD/FACE:  Normocephalic, atraumatic.   CV: RRR with palpation of the radial artery.  PULM: No evidence of respiratory difficulty, symmetric chest rise.  BACK: Negative SLR bilaterally. There is pain to palpation over the lumbar facet joints and paraspinals bilaterally.  Limited ROM with pain on extension.  Positive facet loading bilaterally, R>L.  EXTREMITIES: Pain with palpation to bilateral SI joints.  JENNA is negative bilaterally. Well-healed midline scars to bilateral knees.  Painful extension and flexion of bilateral knees. Medial and lateral joint line tenderness to bilateral knees.  MUSCULOSKELETAL: Bilateral upper and lower extremity strength is normal and symmetric.  No atrophy or tone abnormalities are noted.  NEURO: Bilateral lower extremity coordination and muscle stretch reflexes are physiologic and symmetric.  Plantar response are downgoing. No clonus.  No loss of sensation is noted.  GAIT: Antalgic.      Assessment:       Encounter " Diagnoses   Name Primary?    Lumbar spondylosis Yes    Osteoarthritis of spine without myelopathy or radiculopathy, unspecified spinal region     Facet arthritis of lumbar region     DDD (degenerative disc disease), lumbar     Encounter for long-term opiate analgesic use          Plan:       - Previous imaging was reviewed and discussed with the patient today.     - She is s/p left then right L2,3,4,5 RFAs with benefit.    - Continue oxycodone-acetaminophen 10/325 mg one tablet every 8 hours PRN pain, #90, 0 refills.  She can call for 2 additional refills.    - The patient is here today for a refill of current pain medications and they believe these provide effective pain control and improvements in quality of life.  The patient notes no serious side effects, and feels the benefits outweigh the risks.  The patient was reminded of the pain contract that they signed previously as well as the risks and benefits of the medication including possible death.  The updated [place] Board of Pharm[employer] prescription monitoring program was reviewed, and the patient has been found to be compliant with current treatment plan.     - UDS from 2/26/2019 reviewed and consistent.  Repeat next OV.    - Continue topical pain cream PRN up to 5x/day.    - RTC in 3 months or sooner if needed.    - Dr. Wagner was consulted on the patient and agrees with this plan.      The above plan and management options were discussed at length with patient. Patient is in agreement with the above and verbalized understanding.     Virginia Sanchez, EMILY  07/16/2019

## 2019-07-22 ENCOUNTER — PATIENT OUTREACH (OUTPATIENT)
Dept: ADMINISTRATIVE | Facility: OTHER | Age: 54
End: 2019-07-22

## 2019-07-23 ENCOUNTER — OFFICE VISIT (OUTPATIENT)
Dept: ORTHOPEDICS | Facility: CLINIC | Age: 54
End: 2019-07-23
Payer: MEDICARE

## 2019-07-23 ENCOUNTER — HOSPITAL ENCOUNTER (OUTPATIENT)
Dept: RADIOLOGY | Facility: OTHER | Age: 54
Discharge: HOME | End: 2019-07-23
Attending: ORTHOPAEDIC SURGERY
Payer: MEDICARE

## 2019-07-23 ENCOUNTER — TELEPHONE (OUTPATIENT)
Dept: BARIATRICS | Facility: CLINIC | Age: 54
End: 2019-07-23

## 2019-07-23 VITALS
SYSTOLIC BLOOD PRESSURE: 107 MMHG | HEIGHT: 65 IN | HEART RATE: 80 BPM | BODY MASS INDEX: 48.82 KG/M2 | DIASTOLIC BLOOD PRESSURE: 71 MMHG | WEIGHT: 293 LBS

## 2019-07-23 DIAGNOSIS — M65.30 TRIGGER FINGER, UNSPECIFIED FINGER, UNSPECIFIED LATERALITY: Primary | ICD-10-CM

## 2019-07-23 DIAGNOSIS — M65.312 TRIGGER FINGER OF LEFT THUMB: Primary | ICD-10-CM

## 2019-07-23 DIAGNOSIS — R52 PAIN: ICD-10-CM

## 2019-07-23 PROCEDURE — 73130 XR HAND COMPLETE 3 VIEW LEFT: ICD-10-PCS | Mod: 26,LT,, | Performed by: RADIOLOGY

## 2019-07-23 PROCEDURE — 99213 OFFICE O/P EST LOW 20 MIN: CPT | Mod: PBBFAC,25 | Performed by: ORTHOPAEDIC SURGERY

## 2019-07-23 PROCEDURE — 73130 X-RAY EXAM OF HAND: CPT | Mod: TC,FY,LT

## 2019-07-23 PROCEDURE — 73130 X-RAY EXAM OF HAND: CPT | Mod: 26,LT,, | Performed by: RADIOLOGY

## 2019-07-23 PROCEDURE — 99214 PR OFFICE/OUTPT VISIT, EST, LEVL IV, 30-39 MIN: ICD-10-PCS | Mod: S$PBB,,, | Performed by: ORTHOPAEDIC SURGERY

## 2019-07-23 PROCEDURE — 99999 PR PBB SHADOW E&M-EST. PATIENT-LVL III: ICD-10-PCS | Mod: PBBFAC,,, | Performed by: ORTHOPAEDIC SURGERY

## 2019-07-23 PROCEDURE — 99214 OFFICE O/P EST MOD 30 MIN: CPT | Mod: S$PBB,,, | Performed by: ORTHOPAEDIC SURGERY

## 2019-07-23 PROCEDURE — 99999 PR PBB SHADOW E&M-EST. PATIENT-LVL III: CPT | Mod: PBBFAC,,, | Performed by: ORTHOPAEDIC SURGERY

## 2019-07-23 NOTE — PROGRESS NOTES
I have personally taken the history and examined this patient. I agree with the resident's note as stated above. Pt has trigger left thumb. Does not want injection. Pt wants surgery. Pt has gone through trigger release before.  I have explained the risks, benefits, and alternatives of the procedure to the patient in great detail. The patient voices understanding and all questions have been answered. The patient agrees with to proceed as planned. Consents were performed in clinic.

## 2019-07-23 NOTE — PROGRESS NOTES
HPI:  Alivia Thomas is a 53 y.o. female who presents to clinic for complaints of a left trigger thumb.  The patient states that this began about 1 month ago and it is been very painful and the thumb has been locking up on her constantly.  Patient states that she had a trigger finger release with Dr. Michele suazo over 5 years ago on the right hand and she has been doing very well since then.  She states that she would like to pursue surgical intervention as she has tried steroid injections with the other side in the past and they did not help her.    ROS:  Patient denies constitutional symptoms, cardiac symptoms, respiratory symptoms, GI symptoms.  The remainder of the musculoskeletal ROS is included in the HPI.    PE:    Gen:  No acute distress  CV:  Peripherally well-perfused.  Pulses 2+ bilaterally.  Lungs:  Normal respiratory effort.  Abdomen:  Soft, non-tender, non-distended  Head/Neck:  Normocephalic.  Atraumatic. No TTP, AROM and PROM intact without pain  Neuro:  CN intact without deficit, SILT throughout B/L Upper & Lower Extremities    MSK:  LUE:  - obvious and significant triggering of the left thumb at the A1 pulley there is significant tenderness to palpation at the A1 pulley  - AROM and PROM of all the other digits of the left hand is intact without pain  -Tinel's Phalen's and Durkan's negative  - AIN/PIN/Radial/Median/Ulnar Nerves assessed in isolation without deficit  - SILT throughout  - Radial & Ulnar arteries palpated 2+  - Capillary Refill <3s        Rads:  No acute fracture    A/P:  Alivia Thomas is a 53 y.o. female with a left trigger thumb.    -Discussed with the patient extensively about the different management options both conservative and surgical options.  Patient does not want to attempt a steroid injection or any conservative therapy as she has tried this in the past for other finger and it did not improve her pain for help with the locking.  She would like to pursue operative  treatment for this.  I have explained the risks, benefits, and alternatives of the procedure in detail.  The patient voices understanding and all questions have been answered.  The patient agrees to proceed as planned.

## 2019-07-23 NOTE — TELEPHONE ENCOUNTER
Spoke with pt, She would like to see Dr. Clements to ask questions. Explained that I will review chart and call her back to schedule appointment.   Patient verbalized understanding.

## 2019-07-23 NOTE — TELEPHONE ENCOUNTER
----- Message from Karan Harris sent at 7/23/2019  4:46 PM CDT -----  Contact: Pt  Needs Advice    Reason for call:The Pt states that she already had a consult with Lori Gutierrez and would like an appt with Dr. Clements to discuss her surgery options.        Communication Preference:[phone]    Additional Information:

## 2019-07-25 RX ORDER — ACETAMINOPHEN AND CODEINE PHOSPHATE 300; 30 MG/1; MG/1
1 TABLET ORAL
Qty: 10 TABLET | Refills: 0 | Status: SHIPPED | OUTPATIENT
Start: 2019-07-25 | End: 2019-08-13

## 2019-07-26 ENCOUNTER — TELEPHONE (OUTPATIENT)
Dept: ORTHOPEDICS | Facility: CLINIC | Age: 54
End: 2019-07-26

## 2019-07-26 ENCOUNTER — ANESTHESIA EVENT (OUTPATIENT)
Dept: SURGERY | Facility: OTHER | Age: 54
End: 2019-07-26
Payer: MEDICARE

## 2019-07-26 ENCOUNTER — HOSPITAL ENCOUNTER (OUTPATIENT)
Dept: PREADMISSION TESTING | Facility: OTHER | Age: 54
Discharge: HOME | End: 2019-07-26
Attending: ORTHOPAEDIC SURGERY
Payer: MEDICARE

## 2019-07-26 VITALS
HEIGHT: 65 IN | SYSTOLIC BLOOD PRESSURE: 116 MMHG | BODY MASS INDEX: 48.82 KG/M2 | WEIGHT: 293 LBS | TEMPERATURE: 98 F | OXYGEN SATURATION: 96 % | HEART RATE: 79 BPM | DIASTOLIC BLOOD PRESSURE: 70 MMHG

## 2019-07-26 RX ORDER — LIDOCAINE HYDROCHLORIDE 10 MG/ML
0.5 INJECTION, SOLUTION EPIDURAL; INFILTRATION; INTRACAUDAL; PERINEURAL ONCE
Status: CANCELLED | OUTPATIENT
Start: 2019-07-26 | End: 2019-07-26

## 2019-07-26 RX ORDER — SODIUM CHLORIDE, SODIUM LACTATE, POTASSIUM CHLORIDE, CALCIUM CHLORIDE 600; 310; 30; 20 MG/100ML; MG/100ML; MG/100ML; MG/100ML
INJECTION, SOLUTION INTRAVENOUS CONTINUOUS
Status: CANCELLED | OUTPATIENT
Start: 2019-07-26

## 2019-07-26 NOTE — DISCHARGE INSTRUCTIONS
PRE-ADMIT TESTING -  [phone]    [address]  [place] [facility]          Your surgery has been scheduled at [facility]. We are pleased to have the opportunity to serve you. For Further Information please call [phone].    On the day of surgery please report to the Information Desk on the 1st floor.    · CONTACT YOUR PHYSICIAN'S OFFICE THE DAY PRIOR TO YOUR SURGERY TO OBTAIN YOUR ARRIVAL TIME.     · The evening before surgery do not eat anything after 9 p.m. ( this includes hard candy, chewing gum and mints).  You may only have GATORADE, POWERADE AND WATER  from 9 p.m. until you leave your home.   DO NOT DRINK ANY LIQUIDS ON THE WAY TO THE HOSPITAL.      SPECIAL MEDICATION INSTRUCTIONS: TAKE medications checked off by the Anesthesiologist on your Medication List.    Angiogram Patients: Take medications as instructed by your physician, including aspirin.     Surgery Patients:    If you take ASPIRIN - Your PHYSICIAN/SURGEON will need to inform you IF/OR when you need to stop taking aspirin prior to your surgery.     Do Not take any medications containing IBUPROFEN.  Do Not Wear any make-up or dark nail polish   (especially eye make-up) to surgery. If you come to surgery with makeup on you will be required to remove the makeup or nail polish.    Do not shave your surgical area at least 5 days prior to your surgery. The surgical prep will be performed at the hospital according to Infection Control regulations.    Leave all valuables at home.   Do Not wear any jewelry or watches, including any metal in body piercings. Jewelry must be removed prior to coming to the hospital.  There is a possibility that rings that are unable to be removed may be cut off if they are on the surgical extremity.    Contact Lens must be removed before surgery. Either do not wear the contact lens or bring a case and solution for storage.  Please bring a container for eyeglasses or dentures as required.  Bring  any paperwork your physician has provided, such as consent forms,  history and physicals, doctor's orders, etc.   Bring comfortable clothes that are loose fitting to wear upon discharge. Take into consideration the type of surgery being performed.  Maintain your diet as advised per your physician the day prior to surgery.      Adequate rest the night before surgery is advised.   Park in the Parking lot behind the hospital or in the [facility] across the street from the parking lot. Parking is complimentary.  If you will be discharged the same day as your procedure, please arrange for a responsible adult to drive you home or to accompany you if traveling by taxi.   YOU WILL NOT BE PERMITTED TO DRIVE OR TO LEAVE THE HOSPITAL ALONE AFTER SURGERY.   It is strongly recommended that you arrange for someone to remain with you for the first 24 hrs following your surgery.    The Surgeon will speak to your family/visitor after your surgery regarding the outcome of your surgery and post op care.  The Surgeon may speak to you after your surgery, but there is a possibility you may not remember the details.  Please check with your family members regarding the conversation with the Surgeon.      Thank you for your cooperation.  The Staff of [facility].                Bathing Instructions with Hibiclens     Shower the evening before and morning of your procedure with Hibiclens:   Wash your face with water and your regular face wash/soap   Apply Hibiclens directly on your skin or on a wet washcloth and wash gently. When showering: Move away from the shower stream when applying Hibiclens to avoid rinsing off too soon.   Rinse thoroughly with warm water   Do not dilute Hibiclens         Dry off as usual, do not use any deodorant, powder, body lotions, perfume, after shave or cologne.

## 2019-07-26 NOTE — ANESTHESIA PREPROCEDURE EVALUATION
07/26/2019  Alivia Thomas is a 53 y.o., female.    Anesthesia Evaluation    I have reviewed the Patient Summary Reports.     I have reviewed the Medications.     Review of Systems  Anesthesia Hx:  No problems with previous Anesthesia  History of prior surgery of interest to airway management or planning: Denies Family Hx of Anesthesia complications.   Denies Personal Hx of Anesthesia complications.   Social:  Non-Smoker    Cardiovascular:   Exercise tolerance: poor Stress 2/2019  · The relative PET images are normal showing no clinically significant regional resting or stress induced perfusion defects.  · Gated perfusion images showed an ejection fraction of 77 % at rest and 80 % during stress. Normal is >51%.  · Wall motion was normal at rest and stress.  · LV cavity size is normal at rest and normal at stress.  · The EKG portion of this study is negative for myocardial ischemia.  · Arrhythmias during stress: rare PACs.  · The patient reported headache during the stress test.      Pulmonary:   Asthma mild Sleep Apnea, CPAP    Hepatic/GI:   GERD, well controlled    Musculoskeletal:   Arthritis   Spine Disorders: lumbar    Endocrine:   Diabetes    Psych:   depression          Physical Exam  General:  Morbid Obesity    Airway/Jaw/Neck:  Airway Findings: Mouth Opening: Normal Tongue: Normal  General Airway Assessment: Adult  Improves to I with phonation.  TM Distance: Normal, at least 6 cm      Dental:  Dental Findings: In tact, Molar caps   Chest/Lungs:  Chest/Lungs Clear        Mental Status:  Mental Status Findings:  Cooperative, Alert and Oriented         Anesthesia Plan  Type of Anesthesia, risks & benefits discussed:  Anesthesia Type:  general, MAC  Patient's Preference:   Intra-op Monitoring Plan: standard ASA monitors  Intra-op Monitoring Plan Comments:   Post Op Pain Control Plan: per primary  service following discharge from PACU  Post Op Pain Control Plan Comments:   Induction:   IV  Beta Blocker:  Patient is not currently on a Beta-Blocker (No further documentation required).       Informed Consent: Patient understands risks and agrees with Anesthesia plan.  Questions answered. Anesthesia consent signed with patient.  ASA Score: 3     Day of Surgery Review of History & Physical:    H&P update referred to the surgeon.     Anesthesia Plan Notes: IV propofol for local injection by surgeon          Ready For Surgery From Anesthesia Perspective.

## 2019-07-29 ENCOUNTER — ANESTHESIA (OUTPATIENT)
Dept: SURGERY | Facility: OTHER | Age: 54
End: 2019-07-29
Payer: MEDICARE

## 2019-07-29 ENCOUNTER — HOSPITAL ENCOUNTER (OUTPATIENT)
Facility: OTHER | Age: 54
Discharge: HOME | End: 2019-07-29
Attending: ORTHOPAEDIC SURGERY | Admitting: ORTHOPAEDIC SURGERY
Payer: MEDICARE

## 2019-07-29 VITALS
TEMPERATURE: 99 F | RESPIRATION RATE: 16 BRPM | DIASTOLIC BLOOD PRESSURE: 73 MMHG | OXYGEN SATURATION: 100 % | WEIGHT: 293 LBS | HEART RATE: 70 BPM | BODY MASS INDEX: 48.82 KG/M2 | HEIGHT: 65 IN | SYSTOLIC BLOOD PRESSURE: 144 MMHG

## 2019-07-29 DIAGNOSIS — M65.30 TRIGGER FINGER: ICD-10-CM

## 2019-07-29 LAB — POCT GLUCOSE: 89 MG/DL (ref 70–110)

## 2019-07-29 PROCEDURE — 63600175 PHARM REV CODE 636 W HCPCS: Performed by: NURSE ANESTHETIST, CERTIFIED REGISTERED

## 2019-07-29 PROCEDURE — 26055 INCISE FINGER TENDON SHEATH: CPT | Mod: FA,,, | Performed by: ORTHOPAEDIC SURGERY

## 2019-07-29 PROCEDURE — 25000003 PHARM REV CODE 250: Performed by: ORTHOPAEDIC SURGERY

## 2019-07-29 PROCEDURE — 71000015 HC POSTOP RECOV 1ST HR: Performed by: ORTHOPAEDIC SURGERY

## 2019-07-29 PROCEDURE — 36000707: Performed by: ORTHOPAEDIC SURGERY

## 2019-07-29 PROCEDURE — 37000008 HC ANESTHESIA 1ST 15 MINUTES: Performed by: ORTHOPAEDIC SURGERY

## 2019-07-29 PROCEDURE — 63600175 PHARM REV CODE 636 W HCPCS: Performed by: STUDENT IN AN ORGANIZED HEALTH CARE EDUCATION/TRAINING PROGRAM

## 2019-07-29 PROCEDURE — 37000009 HC ANESTHESIA EA ADD 15 MINS: Performed by: ORTHOPAEDIC SURGERY

## 2019-07-29 PROCEDURE — 26055 PR INCISE FINGER TENDON SHEATH: ICD-10-PCS | Mod: FA,,, | Performed by: ORTHOPAEDIC SURGERY

## 2019-07-29 PROCEDURE — 36000706: Performed by: ORTHOPAEDIC SURGERY

## 2019-07-29 PROCEDURE — 63600175 PHARM REV CODE 636 W HCPCS: Performed by: ANESTHESIOLOGY

## 2019-07-29 PROCEDURE — 82962 GLUCOSE BLOOD TEST: CPT | Performed by: ORTHOPAEDIC SURGERY

## 2019-07-29 RX ORDER — CEFAZOLIN SODIUM 1 G/3ML
2 INJECTION, POWDER, FOR SOLUTION INTRAMUSCULAR; INTRAVENOUS
Status: COMPLETED | OUTPATIENT
Start: 2019-07-29 | End: 2019-07-29

## 2019-07-29 RX ORDER — LIDOCAINE HYDROCHLORIDE 10 MG/ML
0.5 INJECTION, SOLUTION EPIDURAL; INFILTRATION; INTRACAUDAL; PERINEURAL ONCE
Status: DISCONTINUED | OUTPATIENT
Start: 2019-07-29 | End: 2019-07-29 | Stop reason: HOSPADM

## 2019-07-29 RX ORDER — ONDANSETRON 2 MG/ML
4 INJECTION INTRAMUSCULAR; INTRAVENOUS EVERY 12 HOURS PRN
Status: DISCONTINUED | OUTPATIENT
Start: 2019-07-29 | End: 2019-07-29 | Stop reason: HOSPADM

## 2019-07-29 RX ORDER — PROPOFOL 10 MG/ML
VIAL (ML) INTRAVENOUS
Status: DISCONTINUED | OUTPATIENT
Start: 2019-07-29 | End: 2019-07-29

## 2019-07-29 RX ORDER — MIDAZOLAM HYDROCHLORIDE 1 MG/ML
INJECTION INTRAMUSCULAR; INTRAVENOUS
Status: DISCONTINUED | OUTPATIENT
Start: 2019-07-29 | End: 2019-07-29

## 2019-07-29 RX ORDER — BACITRACIN ZINC 500 UNIT/G
OINTMENT (GRAM) TOPICAL
Status: DISCONTINUED | OUTPATIENT
Start: 2019-07-29 | End: 2019-07-29 | Stop reason: HOSPADM

## 2019-07-29 RX ORDER — SODIUM CHLORIDE 9 MG/ML
INJECTION, SOLUTION INTRAVENOUS CONTINUOUS
Status: DISCONTINUED | OUTPATIENT
Start: 2019-07-29 | End: 2019-07-29 | Stop reason: HOSPADM

## 2019-07-29 RX ORDER — FENTANYL CITRATE 50 UG/ML
INJECTION, SOLUTION INTRAMUSCULAR; INTRAVENOUS
Status: DISCONTINUED | OUTPATIENT
Start: 2019-07-29 | End: 2019-07-29

## 2019-07-29 RX ORDER — BUPIVACAINE HYDROCHLORIDE 2.5 MG/ML
INJECTION, SOLUTION EPIDURAL; INFILTRATION; INTRACAUDAL
Status: DISCONTINUED | OUTPATIENT
Start: 2019-07-29 | End: 2019-07-29 | Stop reason: HOSPADM

## 2019-07-29 RX ORDER — HYDROMORPHONE HYDROCHLORIDE 2 MG/ML
0.2 INJECTION, SOLUTION INTRAMUSCULAR; INTRAVENOUS; SUBCUTANEOUS EVERY 5 MIN PRN
Status: DISCONTINUED | OUTPATIENT
Start: 2019-07-29 | End: 2019-07-29 | Stop reason: HOSPADM

## 2019-07-29 RX ORDER — HYDROCODONE BITARTRATE AND ACETAMINOPHEN 10; 325 MG/1; MG/1
1 TABLET ORAL EVERY 4 HOURS PRN
Status: DISCONTINUED | OUTPATIENT
Start: 2019-07-29 | End: 2019-07-29 | Stop reason: HOSPADM

## 2019-07-29 RX ORDER — SODIUM CHLORIDE 0.9 % (FLUSH) 0.9 %
5 SYRINGE (ML) INJECTION
Status: DISCONTINUED | OUTPATIENT
Start: 2019-07-29 | End: 2019-07-29 | Stop reason: HOSPADM

## 2019-07-29 RX ORDER — OXYCODONE HYDROCHLORIDE 5 MG/1
5 TABLET ORAL
Status: DISCONTINUED | OUTPATIENT
Start: 2019-07-29 | End: 2019-07-29 | Stop reason: HOSPADM

## 2019-07-29 RX ORDER — LIDOCAINE HYDROCHLORIDE 10 MG/ML
INJECTION, SOLUTION EPIDURAL; INFILTRATION; INTRACAUDAL; PERINEURAL
Status: DISCONTINUED | OUTPATIENT
Start: 2019-07-29 | End: 2019-07-29 | Stop reason: HOSPADM

## 2019-07-29 RX ORDER — HYDROCODONE BITARTRATE AND ACETAMINOPHEN 5; 325 MG/1; MG/1
1 TABLET ORAL EVERY 4 HOURS PRN
Status: DISCONTINUED | OUTPATIENT
Start: 2019-07-29 | End: 2019-07-29 | Stop reason: HOSPADM

## 2019-07-29 RX ORDER — SODIUM CHLORIDE 0.9 % (FLUSH) 0.9 %
3 SYRINGE (ML) INJECTION
Status: DISCONTINUED | OUTPATIENT
Start: 2019-07-29 | End: 2019-07-29 | Stop reason: HOSPADM

## 2019-07-29 RX ORDER — SODIUM CHLORIDE, SODIUM LACTATE, POTASSIUM CHLORIDE, CALCIUM CHLORIDE 600; 310; 30; 20 MG/100ML; MG/100ML; MG/100ML; MG/100ML
INJECTION, SOLUTION INTRAVENOUS CONTINUOUS
Status: DISCONTINUED | OUTPATIENT
Start: 2019-07-29 | End: 2019-07-29 | Stop reason: HOSPADM

## 2019-07-29 RX ORDER — PROPOFOL 10 MG/ML
VIAL (ML) INTRAVENOUS CONTINUOUS PRN
Status: DISCONTINUED | OUTPATIENT
Start: 2019-07-29 | End: 2019-07-29

## 2019-07-29 RX ADMIN — FENTANYL CITRATE 100 MCG: 50 INJECTION, SOLUTION INTRAMUSCULAR; INTRAVENOUS at 08:07

## 2019-07-29 RX ADMIN — PROPOFOL 30 MG: 10 INJECTION, EMULSION INTRAVENOUS at 08:07

## 2019-07-29 RX ADMIN — MIDAZOLAM HYDROCHLORIDE 2 MG: 1 INJECTION, SOLUTION INTRAMUSCULAR; INTRAVENOUS at 08:07

## 2019-07-29 RX ADMIN — PROPOFOL 50 MCG/KG/MIN: 10 INJECTION, EMULSION INTRAVENOUS at 08:07

## 2019-07-29 RX ADMIN — CEFAZOLIN 3 G: 330 INJECTION, POWDER, FOR SOLUTION INTRAMUSCULAR; INTRAVENOUS at 08:07

## 2019-07-29 RX ADMIN — PROPOFOL 30 MG: 10 INJECTION, EMULSION INTRAVENOUS at 09:07

## 2019-07-29 RX ADMIN — SODIUM CHLORIDE, SODIUM LACTATE, POTASSIUM CHLORIDE, AND CALCIUM CHLORIDE: 600; 310; 30; 20 INJECTION, SOLUTION INTRAVENOUS at 08:07

## 2019-07-29 NOTE — DISCHARGE INSTRUCTIONS
Keep dressing clean, dry & intact until follow-up visit.   Eleate operative extremity.    Anesthesia: Monitored Anesthesia Care (MAC)  Anesthesia safety  Tips for anesthesia safety include the following:   · Follow all instructions you are given for how long not to eat or drink before your procedure.  · Be sure your healthcare provider knows what medicines you take, especially any anti-inflammatory medicine or blood thinners. This includes aspirin and any other over-the-counter medicines, herbs, and supplements.  · Have an adult family member or friend drive you home after the procedure.  · For the first 24 hours after your surgery:  ¨ Do not drive or use heavy equipment.  ¨ Do not make important decisions or sign documents.  ¨ Avoid alcohol.  ¨ Have someone stay with you, if possible. They can watch for problems and help keep you safe.  Date Last Reviewed: 12/1/2016  © 7814-1185 [email or web address]. [address] [place] [address]. All rights reserved. This information is not intended as a substitute for professional medical care. Always follow your healthcare professional's instructions.

## 2019-07-29 NOTE — ANESTHESIA POSTPROCEDURE EVALUATION
Anesthesia Post Evaluation    Patient: Alivia Thomas    Procedure(s) Performed: Procedure(s) (LRB):  RELEASE, TRIGGER FINGER, Left Thumb (Left)    Final Anesthesia Type: general  Patient location during evaluation: [facility]  Patient participation: Yes- Able to Participate  Level of consciousness: awake and alert  Post-procedure vital signs: reviewed and stable  Pain management: adequate  Airway patency: patent  PONV status at discharge: No PONV  Anesthetic complications: no      Cardiovascular status: blood pressure returned to baseline  Respiratory status: unassisted, room air and spontaneous ventilation  Hydration status: euvolemic  Follow-up not needed.          Vitals Value Taken Time   /74 7/29/2019  7:00 AM   Temp 37 °C (98.6 °F) 7/29/2019  7:00 AM   Pulse 92 7/29/2019  7:00 AM   Resp 18 7/29/2019  7:00 AM   SpO2 98 % 7/29/2019  7:00 AM         No case tracking events are documented in the log.      Pain/Aracely Score: No data recorded

## 2019-07-29 NOTE — PLAN OF CARE
Alivia Thomas has met all discharge criteria from Phase II. Vital Signs are stable, ambulating  without difficulty. Discharge instructions given, patient verbalized understanding. Discharged from facility via wheelchair in stable condition.

## 2019-07-29 NOTE — OP NOTE
[facility]  Surgery Department  Operative Note    SUMMARY     Date of Procedure: 7/29/2019     Procedure: Procedure(s) (LRB):  RELEASE, TRIGGER FINGER, Left Thumb (Left)     Surgeon(s) and Role:     * Velma Garcia MD - Primary    Assisting Surgeon: None    Pre-Operative Diagnosis: Trigger finger of left thumb [M65.312]    Post-Operative Diagnosis: Post-Op Diagnosis Codes:     * Trigger finger of left thumb [M65.312]    Anesthesia: Local MAC    Technical Procedures Used: surgery    Description of the Findings of the Procedure: DATE OF PROCEDURE: [unfilled]  SERVICE: Orthopedics.   ATTENDING SURGEON: Velma Garcia M.D.   ASSISTANT SURGEON: Gisela   PREOPERATIVE DIAGNOSIS:left thumb trigger finger.   POSTOPERATIVE DIAGNOSIS: l  left  Thumb finger trigger finger.   PROCEDURE: left  Thumb  finger A1 pulley release.   ANESTHESIA: Local placed by surgeon and MAC.   FLUID: Lactated Ringer's.   BLOOD LOSS: None, no blood was given.   TOURNIQUET TIME: 10 minutes.   PACKS AND DRAINS: None.   IMPLANTS: None.   SPECIMENS: None.   COMPLICATIONS: None.   INDICATIONS FOR PROCEDURE: Alivia Thomas is a 53 y.o.year-old who has failed   conservative treatment for  left  Thumb finger trigger finger; therefore,   operative intervention was deemed necessary. Risks and benefits were explained   to the patient in clinic. Consents were performed in clinic.   PROCEDURE IN DETAIL: After the correct site was marked with the patient's   participation in the Holding Area, the patient was brought to the Operating   Room, placed in supine position, underwent MAC anesthesia. A well-padded   nonsterile tourniquet was placed on the right forearm. Time-out was called for   correct site, procedure and patient to be indicated. Under sterile conditions,   an injection of lidocaine 1% without epinephrine was injected at the A1 pulley   space. The arm was prepped and draped in normal sterile fashion. Incision was   marked  out in a longitudinal fashion along the pulley. The arm was   exsanguinated using Esmarch. Tourniquet was insufflated 250 mmHg and that is   where it remained for 10 minutes. The incision was made. Careful dissection   down was maintained to the A1 pulley. The neurovascular structures were   retracted out of the way. The A1 pulley was identified. It was sharply   incised. It was completely incised proximally and distally. Portion of    pulley was also incised. The tendon was then retracted out of the tendon sheath   using a right angle. The finger was placed through range of motion, showed it   did not trigger. The area was irrigated with copious amounts of normal saline.   Nylon closed the skin. Sterile dressing was applied. Tourniquet was deflated.   Brisk cap refill ensued. The patient was transferred the Recovery Room in   stable condition.   POSTOPERATIVE PLAN FOR THIS PATIENT: The patient is to keep the dressing clean, dry and   intact. We will take the sutures out at two weeks.      Significant Surgical Tasks Conducted by the Assistant(s), if Applicable: none    Complications: No    Estimated Blood Loss (EBL): * No values recorded between 7/29/2019 12:00 AM and 7/29/2019  9:09 AM *           Implants: * No implants in log *    Specimens:   Specimen (12h ago, onward)    None                  Condition: Good    Disposition: PACU - hemodynamically stable.    Attestation: I performed the procedure.    Discharge Note    SUMMARY     Admit Date: 7/29/2019    Discharge Date and Time:  07/29/2019 9:09 AM    Hospital Course (synopsis of major diagnoses, care, treatment, and services provided during the course of the hospital stay): surgery     Final Diagnosis: Post-Op Diagnosis Codes:     * Trigger finger of left thumb [M65.312]    Disposition: Home or Self Care    Follow Up/Patient Instructions:     Medications:  Reconciled Home Medications:      Medication List      ASK your doctor about these medications     acetaminophen-codeine 300-30mg 300-30 mg Tab  Commonly known as:  TYLENOL #3  Take 1 tablet by mouth every 4 to 6 hours as needed (moderate to severe pain).     aspirin 81 MG EC tablet  Commonly known as:  ECOTRIN  Take 1 tablet by mouth every morning. prevents heart attacks and strokes     atorvastatin 20 MG tablet  Commonly known as:  LIPITOR  Take 1 tablet (20 mg total) by mouth once daily.     * blood sugar diagnostic Strp  Freestyle light strips and lancets     * blood sugar diagnostic Strp  Check glucose four times daily Strips and lancets covered by insurance E11.9 - One touch     blood-glucose meter kit  Check glucose four times daily E11.9 meter covered by insurance One Touch     celecoxib 200 MG capsule  Commonly known as:  CeleBREX  Take 1 capsule (200 mg total) by mouth once daily.     CENTRUM 3,500-18-0.4 unit-mg-mg Chew  Generic drug:  multivit-iron-min-folic acid  Take 1 tablet by mouth once daily.     diclofenac sodium 1 % Gel  Commonly known as:  VOLTAREN  Apply 2 g topically once daily.     FLUoxetine 40 MG capsule  Take 1 capsule (40 mg total) by mouth once daily.     fluticasone propionate 50 mcg/actuation nasal spray  Commonly known as:  FLONASE  1 spray by Each Nare route 2 (two) times daily as needed for Rhinitis.     insulin detemir U-100 100 unit/mL (3 mL) Inpn pen  Commonly known as:  LEVEMIR FLEXPEN  Inject 40 units every evening     insulin lispro 100 unit/mL pen  Commonly known as:  HumaLOG KwikPen Insulin  INJECT 11 UNITS SUBCUTANEOUSLY BEFORE MEAL(S) PLUS  SLIDING  SCALE max 63 Units per day     lisinopril 2.5 MG tablet  Commonly known as:  PRINIVIL,ZESTRIL  TAKE 1 TABLET BY MOUTH DAILY FOR PROTEINURIA     metFORMIN 500 MG 24 hr tablet  Commonly known as:  GLUCOPHAGE-XR  Take 2 tablets (1,000 mg total) by mouth 2 (two) times daily.     omeprazole 20 MG capsule  Commonly known as:  PRILOSEC  Take 1 capsule (20 mg total) by mouth every morning.     oxyCODONE-acetaminophen  mg per  "tablet  Commonly known as:  PERCOCET  Take 1 tablet by mouth every 8 (eight) hours as needed for Pain.     pen needle, diabetic 33 gauge x 5/32" Ndle  1 application by Misc.(Non-Drug; Combo Route) route 4 (four) times daily with meals and nightly.     VENTOLIN HFA 90 mcg/actuation inhaler  Generic drug:  albuterol  INHALE TWO PUFFS INTO LUNGS EVERY 4 TO 6 HOURS AS NEEDED FOR SHORTNESS OF BREATH AND FOR WHEEZING     vitamin D 1000 units Tab  Commonly known as:  VITAMIN D3  Take 5,000 mg by mouth once daily.         * This list has 2 medication(s) that are the same as other medications prescribed for you. Read the directions carefully, and ask your doctor or other care provider to review them with you.              No discharge procedures on file.    "

## 2019-07-31 ENCOUNTER — TELEPHONE (OUTPATIENT)
Dept: BARIATRICS | Facility: CLINIC | Age: 54
End: 2019-07-31

## 2019-07-31 NOTE — TELEPHONE ENCOUNTER
----- Message from Karan Harris sent at 7/31/2019  9:12 AM CDT -----  Contact: Pt  Needs Advice    Reason for call:The Pt states that she has lost her 15 pounds that Dr. Clements wanted her to lose so that she can have her surgery.  Please contact the Pt to schedule her surgery.        Communication Preference:[phone]    Additional Information:

## 2019-07-31 NOTE — TELEPHONE ENCOUNTER
Called patient and scheduled an appointment per her request. Patient states she has already see an SUNDEEP and needs to see WSR. Appt made, will be seen by WSR.

## 2019-08-02 DIAGNOSIS — K21.9 GASTROESOPHAGEAL REFLUX DISEASE, ESOPHAGITIS PRESENCE NOT SPECIFIED: ICD-10-CM

## 2019-08-02 DIAGNOSIS — G47.30 SLEEP APNEA, UNSPECIFIED TYPE: Primary | ICD-10-CM

## 2019-08-02 DIAGNOSIS — R06.00 DYSPNEA, UNSPECIFIED TYPE: ICD-10-CM

## 2019-08-02 DIAGNOSIS — E66.01 MORBID OBESITY WITH BMI OF 60.0-69.9, ADULT: ICD-10-CM

## 2019-08-02 DIAGNOSIS — G47.33 OSA ON CPAP: ICD-10-CM

## 2019-08-06 ENCOUNTER — HOSPITAL ENCOUNTER (OUTPATIENT)
Dept: PULMONOLOGY | Facility: CLINIC | Age: 54
Discharge: HOME | End: 2019-08-06
Payer: MEDICARE

## 2019-08-06 ENCOUNTER — OFFICE VISIT (OUTPATIENT)
Dept: BARIATRICS | Facility: CLINIC | Age: 54
End: 2019-08-06
Payer: MEDICARE

## 2019-08-06 VITALS
SYSTOLIC BLOOD PRESSURE: 118 MMHG | WEIGHT: 293 LBS | HEART RATE: 74 BPM | HEIGHT: 65 IN | BODY MASS INDEX: 48.82 KG/M2 | DIASTOLIC BLOOD PRESSURE: 68 MMHG

## 2019-08-06 DIAGNOSIS — Z01.818 PREOPERATIVE EVALUATION TO RULE OUT SURGICAL CONTRAINDICATION: Primary | ICD-10-CM

## 2019-08-06 DIAGNOSIS — G47.33 OSA ON CPAP: ICD-10-CM

## 2019-08-06 DIAGNOSIS — R06.00 DYSPNEA, UNSPECIFIED TYPE: ICD-10-CM

## 2019-08-06 DIAGNOSIS — K21.9 GASTROESOPHAGEAL REFLUX DISEASE, ESOPHAGITIS PRESENCE NOT SPECIFIED: ICD-10-CM

## 2019-08-06 DIAGNOSIS — D53.1 COMBINED B12 AND FOLATE DEFICIENCY ANEMIA: ICD-10-CM

## 2019-08-06 DIAGNOSIS — E66.01 MORBID OBESITY WITH BMI OF 60.0-69.9, ADULT: ICD-10-CM

## 2019-08-06 DIAGNOSIS — G47.30 SLEEP APNEA, UNSPECIFIED TYPE: ICD-10-CM

## 2019-08-06 DIAGNOSIS — E11.65 UNCONTROLLED TYPE 2 DIABETES MELLITUS WITH HYPERGLYCEMIA: ICD-10-CM

## 2019-08-06 LAB
PRE FEV1 FVC: 76
PRE FEV1: 2.09
PRE FVC: 2.74
PREDICTED FEV1 FVC: 81
PREDICTED FEV1: 2.62
PREDICTED FVC: 3.23

## 2019-08-06 PROCEDURE — 99214 OFFICE O/P EST MOD 30 MIN: CPT | Mod: S$PBB,,, | Performed by: SURGERY

## 2019-08-06 PROCEDURE — 94010 BREATHING CAPACITY TEST: ICD-10-PCS | Mod: 26,S$PBB,, | Performed by: INTERNAL MEDICINE

## 2019-08-06 PROCEDURE — 99999 PR PBB SHADOW E&M-EST. PATIENT-LVL IV: CPT | Mod: PBBFAC,,, | Performed by: SURGERY

## 2019-08-06 PROCEDURE — 94729 PR C02/MEMBANE DIFFUSE CAPACITY: ICD-10-PCS | Mod: 26,S$PBB,, | Performed by: INTERNAL MEDICINE

## 2019-08-06 PROCEDURE — 94010 BREATHING CAPACITY TEST: CPT | Mod: PBBFAC | Performed by: INTERNAL MEDICINE

## 2019-08-06 PROCEDURE — 99214 OFFICE O/P EST MOD 30 MIN: CPT | Mod: PBBFAC,25 | Performed by: SURGERY

## 2019-08-06 PROCEDURE — 94729 DIFFUSING CAPACITY: CPT | Mod: PBBFAC | Performed by: INTERNAL MEDICINE

## 2019-08-06 PROCEDURE — 99999 PR PBB SHADOW E&M-EST. PATIENT-LVL IV: ICD-10-PCS | Mod: PBBFAC,,, | Performed by: SURGERY

## 2019-08-06 PROCEDURE — 94010 BREATHING CAPACITY TEST: CPT | Mod: 26,S$PBB,, | Performed by: INTERNAL MEDICINE

## 2019-08-06 PROCEDURE — 94729 DIFFUSING CAPACITY: CPT | Mod: 26,S$PBB,, | Performed by: INTERNAL MEDICINE

## 2019-08-06 PROCEDURE — 99214 PR OFFICE/OUTPT VISIT, EST, LEVL IV, 30-39 MIN: ICD-10-PCS | Mod: S$PBB,,, | Performed by: SURGERY

## 2019-08-06 NOTE — MEDICAL/APP STUDENT
History & Physical    SUBJECTIVE:     History of Present Illness:  Patient is a 53 y.o. female presents today to schedule gastric bypass. Since initial consult in Jan 2019, patient has lost 22.4 lbs. She overall has been feeling great and is eager to get the surgery to aid in her weight loss journey.    Chief Complaint   Patient presents with    Follow-up       Review of patient's allergies indicates:   Allergen Reactions    Strawberry Anaphylaxis       Current Outpatient Medications   Medication Sig Dispense Refill    acetaminophen-codeine 300-30mg (TYLENOL #3) 300-30 mg Tab Take 1 tablet by mouth every 4 to 6 hours as needed (moderate to severe pain). 10 tablet 0    aspirin (ECOTRIN) 81 MG EC tablet Take 1 tablet by mouth every morning. prevents heart attacks and strokes      atorvastatin (LIPITOR) 20 MG tablet Take 1 tablet (20 mg total) by mouth once daily. 90 tablet 3    blood sugar diagnostic Strp Freestyle light strips and lancets 100 each 6    blood sugar diagnostic Strp Check glucose four times daily Strips and lancets covered by insurance E11.9 - One touch 120 each 6    blood-glucose meter kit Check glucose four times daily E11.9 meter covered by insurance One Touch 1 each 0    celecoxib (CELEBREX) 200 MG capsule Take 1 capsule (200 mg total) by mouth once daily. 30 capsule 1    diclofenac sodium (VOLTAREN) 1 % Gel Apply 2 g topically once daily. 1 Tube 3    FLUoxetine 40 MG capsule Take 1 capsule (40 mg total) by mouth once daily. 90 capsule 3    fluticasone (FLONASE) 50 mcg/actuation nasal spray 1 spray by Each Nare route 2 (two) times daily as needed for Rhinitis. 15 g 0    insulin detemir U-100 (LEVEMIR FLEXPEN) 100 unit/mL (3 mL) SubQ InPn pen Inject 40 units every evening 36 mL 6    insulin lispro (HUMALOG KWIKPEN INSULIN) 100 unit/mL pen INJECT 11 UNITS SUBCUTANEOUSLY BEFORE MEAL(S) PLUS  SLIDING  SCALE max 63 Units per day 1 Box 6    lisinopril (PRINIVIL,ZESTRIL) 2.5 MG tablet TAKE 1  "TABLET BY MOUTH DAILY FOR PROTEINURIA  10    metFORMIN (GLUCOPHAGE-XR) 500 MG 24 hr tablet Take 2 tablets (1,000 mg total) by mouth 2 (two) times daily. 360 tablet 3    MULTIVIT-IRON-MIN-FOLIC ACID 3,500-18-0.4 UNIT-MG-MG ORAL CHEW Take 1 tablet by mouth once daily.      omeprazole (PRILOSEC) 20 MG capsule Take 1 capsule (20 mg total) by mouth every morning. 90 capsule 3    pen needle, diabetic 33 gauge x 5/32" Ndle 1 application by Misc.(Non-Drug; Combo Route) route 4 (four) times daily with meals and nightly. 100 each 6    VENTOLIN HFA 90 mcg/actuation inhaler INHALE TWO PUFFS INTO LUNGS EVERY 4 TO 6 HOURS AS NEEDED FOR SHORTNESS OF BREATH AND FOR WHEEZING 18 each 0    vitamin D 185 MG Tab Take 5,000 mg by mouth once daily.       No current facility-administered medications for this visit.        Past Medical History:   Diagnosis Date    Allergy     Anemia     Asthma     Depression 1/28/2019    Diabetes mellitus type II     DJD (degenerative joint disease) of knee 6/19/2014    Facet arthritis of lumbar region 12/17/2015    Facet syndrome 12/17/2015    GERD (gastroesophageal reflux disease)     Heartburn     Hyperlipidemia     Hypertension     Morbid obesity     Neuromuscular disorder     Sacroiliitis 6/13/2018    Sleep apnea      Past Surgical History:   Procedure Laterality Date    ABLATION-RADIOFREQUENCY Left 6/18/2019    [facility]    BLOCK, NERVE, INTERCOSTAL, SINGLE Left 4/2/2014    [facility] at [facility] PAIN MGT    BLOCK-NERVE-MEDIAL BRANCH-LUMBAR Bilateral 3/1/2016    [facility] at [facility] PAIN MGT    BLOCK-NERVE-MEDIAL BRANCH-LUMBAR Bilateral 2/16/2016    [facility] at [facility] PAIN T    CARPAL TUNNEL RELEASE      CARPAL TUNNEL RELEASE  1980s    left    CARPAL TUNNEL RELEASE  2012    right    EGD (ESOPHAGOGASTRODUODENOSCOPY) N/A 3/27/2019    Performed by Robbin Bar, " MD at [facility] (2ND FLR)    ESOPHAGOGASTRODUODENOSCOPY (EGD) N/A 12/19/2016    Performed by Gulshan Stroud MD at [facility] (2ND FLR)    JOINT REPLACEMENT Bilateral     with 2 revisions on rt    KNEE SURGERY  3/2010    orthroscope    KNEE SURGERY  6-19-14    left TKR    RADIOFREQUENCY ABLATION Right 7/9/2019    [facility]    Radiofrequency Ablation LEFT L2,3,4,5 RFA Left 12/27/2018    Per[facility] by [facility] at [facility]    Radiofrequency Ablation RIGHT LUMBAR L2,3,4,5 RFA Right 1/29/2019    Performed by [facility] at [facility]    RADIOFREQUENCY ABLATION, NERVE, MEDIAL BRANCH, LUMBAR, 1 LEVEL Right 7/10/2018    Performed by [facility] at [facility]    RADIOFREQUENCY ABLATION, NERVE, MEDIAL BRANCH, LUMBAR, 1 LEVEL Left 6/26/2018    Performed by [facility] at [facility]    RADIOFREQUENCY THERMOCOAGULATION (RFTC)-NERVE-MEDIAN BRANCH-LUMBAR Right 1/9/2018    Performed by [facility] at [facility]    RADIOFREQUENCY THERMOCOAGULATION (RFTC)-NERVE-MEDIAN BRANCH-LUMBAR Left 12/26/2017    Per[facility] by [facility] at [facility]    RADIOFREQUENCY THERMOCOAGULATION (RFTC)-NERVE-MEDIAN BRANCH-LUMBAR Right 10/19/2016    [facility]    RADIOFREQUENCY THERMOCOAGULATION (RFTC)-NERVE-MEDIAN BRANCH-LUMBAR Left 10/5/2016    [facility] at [facility]    RADIOFREQUENCY THERMOCOAGULATION (RFTC)-NERVE-MEDIAN BRANCH-LUMBAR Right 4/20/2016    Per[facility] by [facility] at [facility]    RADIOFREQUENCY THERMOCOAGULATION (RFTC)-NERVE-MEDIAN BRANCH-LUMBAR Left 4/6/2016    Performed by [facility] at [facility]    RADIOFREQUENCY THERMOCOAGULATION (RFTC)-NERVE-MEDIAN BRANCH-LUMBAR/COOLED Right 5/9/2017    [facility] at [facility] PAIN MGT    RADIOFREQUENCY THERMOCOAGULATION (RFTC)-NERVE-MEDIAN  BRANCH-LUMBAR/COOLED Left 4/26/2017    [facility] at [facility] PAIN MGT    RELEASE, TRIGGER FINGER Right 2/4/2013    [facility] at [facility] 1ST FLR    RELEASE, TRIGGER FINGER, Left Thumb Left 7/29/2019    [facility] at [facility]    REPLACEMENT-KNEE-TOTAL Left 6/19/2014    Performed by Theodore Swan MD at [facility] 2ND FLR    Revision Carpal Tunnel-Right Right 12/14/2015    [facility] at [facility]    REVISION-ARTHROPLASTY-KNEE-TOTAL Right 11/4/2014    Performed by [facility] at [facility] 2ND FLR    TRIGGER FINGER RELEASE Right 2017     Family History   Problem Relation Age of Onset    Diabetes Mother     Cataracts Mother     Diabetes Father     Cataracts Father     Coronary artery disease Brother     Diabetes Brother     Hypertension Sister     Diabetes Sister     No Known Problems Sister     Cancer Sister         lymphoma     Diabetes Brother     Hypotension Brother     Kidney failure Brother     Hypotension Brother     Amblyopia Neg Hx     Blindness Neg Hx     Glaucoma Neg Hx     Macular degeneration Neg Hx     Retinal detachment Neg Hx     Strabismus Neg Hx     Stroke Neg Hx     Thyroid disease Neg Hx      Social History     Tobacco Use    Smoking status: Never Smoker    Smokeless tobacco: Never Used   Substance Use Topics    Alcohol use: Yes     Comment: occasionally     Drug use: No        Review of Systems:  Review of Systems   Constitutional: Negative for chills and fatigue.   HENT: Negative for congestion and sore throat.    Respiratory: Negative for cough and shortness of breath.    Cardiovascular: Negative for chest pain and palpitations.   Gastrointestinal: Negative for abdominal pain, constipation, diarrhea, nausea and vomiting.   Genitourinary: Negative for difficulty urinating.   Hematological: Does not bruise/bleed easily.       OBJECTIVE:     Vital Signs (Most  "Recent)  Pulse: 74 (08/06/19 1428)  BP: 118/68 (08/06/19 1428)  5' 5" (1.651 m)  (!) 156.8 kg (345 lb 9.6 oz)     Physical Exam:  Physical Exam   Constitutional: She is oriented to person, place, and time. She appears well-developed and well-nourished.   HENT:   Head: Normocephalic.   Eyes: Pupils are equal, round, and reactive to light.   Neck: Normal range of motion. Neck supple.   Cardiovascular: Normal rate and regular rhythm.   Pulmonary/Chest: Effort normal and breath sounds normal.   Abdominal: Soft. Bowel sounds are normal.   Neurological: She is alert and oriented to person, place, and time.   Skin: Skin is warm and dry.       Laboratory  CBC: Reviewed  CMP: Reviewed  LFTs: Reviewed  Serum Folate, H. Pylori Antibody, HgbA1C, Iron & TIBC, Lipid panel, Hepatic Panel, Magnesium, Phosphorus, TSH, FT4, T4, T3, Vitamin B12, Vitamin B1, Vitamin D 25 Hydroxy    Diagnostic Results:  Labs: Reviewed  ECG: Reviewed  X-Ray: Reviewed    ASSESSMENT/PLAN:   52 yo female presents today to schedule gastric bypass. Based on history, ROS, and physical exam, plan as follows:    PLAN:Plan   1. Morbid Obesity with Body mass index is 61.3 kg/m². and inability to lose any significant weight but has lost 22 pounds since January.   - Patient will be scheduled for gastric bypass surgery. Patient will be called with date.  - Patient will be getting cardiology clearance on 8/13  - Patient will get repeat labs on 8/12, such as Vit D, HgbA1c, and Iron studies  2. Co-morbidities: essential HLD, DM type 2 which was out of control in the past, on long term insulin, and no complication, PADDY on cpap but doesn;t use, Asthma, osteoarthritis , improving gerd  3. Iron deficiency, vit d deficiency under treatment  3. For lrgby with possible hiatal hernia repair       "

## 2019-08-06 NOTE — PROGRESS NOTES
I have seen the patient, reviewed the Student's history and physical, assessment and plan. I have personally interviewed and examined the patient at bedside and: agree with the findings.     1. Morbid Obesity with Body mass index is 61.3 kg/m². and inability to lose any significant weight but has lost 22 pounds since January.   2. Co-morbidities: essential HLD, DM type 2 which was out of control in the past, on long term insulin, and no complication, PADDY on cpap but doesn;t use, Asthma, osteoarthritis , improving gerd  3. Iron deficiency, vit d deficiency under treatment  3. For lrgby with possible hiatal hernia repair

## 2019-08-07 ENCOUNTER — TELEPHONE (OUTPATIENT)
Dept: BARIATRICS | Facility: CLINIC | Age: 54
End: 2019-08-07

## 2019-08-07 DIAGNOSIS — E66.01 MORBID OBESITY: Primary | ICD-10-CM

## 2019-08-07 DIAGNOSIS — G47.33 OSA ON CPAP: ICD-10-CM

## 2019-08-07 DIAGNOSIS — Z71.3 NUTRITIONAL COUNSELING: ICD-10-CM

## 2019-08-07 DIAGNOSIS — E78.5 HYPERLIPIDEMIA, UNSPECIFIED HYPERLIPIDEMIA TYPE: ICD-10-CM

## 2019-08-07 DIAGNOSIS — D53.1 COMBINED B12 AND FOLATE DEFICIENCY ANEMIA: ICD-10-CM

## 2019-08-07 DIAGNOSIS — M17.0 PRIMARY OSTEOARTHRITIS OF BOTH KNEES: ICD-10-CM

## 2019-08-07 DIAGNOSIS — E11.65 UNCONTROLLED TYPE 2 DIABETES MELLITUS WITH HYPERGLYCEMIA: ICD-10-CM

## 2019-08-07 NOTE — TELEPHONE ENCOUNTER
----- Message from Beatriz Rowland sent at 8/7/2019  8:41 AM CDT -----  Contact: Patient   Patient Returning Call from [facility]    Who Left Message for Patient: Alison  Communication Preference: [phone]   Additional Information: please contact the patient as she was offered a surgery date of 8/28 yesterday and she would like to accept

## 2019-08-08 NOTE — TELEPHONE ENCOUNTER
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 will not require additional clinicals.     Thanks    Previous Messages      ----- Message -----   From: Alison Hong RN   Sent: 8/6/2019   5:43 PM   To: Shannon Lord RN, José Antonio Chisholm, *     Garry Faye,   Ms. [place] is scheduled for a LRNY possibly on 8/28/19.  She meets Medicare criteria for surgery.  Could you please look into her secondary to see if anything is needed?   Thank you,   Alison

## 2019-08-09 ENCOUNTER — TELEPHONE (OUTPATIENT)
Dept: BARIATRICS | Facility: CLINIC | Age: 54
End: 2019-08-09

## 2019-08-09 DIAGNOSIS — E11.65 UNCONTROLLED TYPE 2 DIABETES MELLITUS WITH HYPERGLYCEMIA: ICD-10-CM

## 2019-08-09 DIAGNOSIS — E78.5 HYPERLIPIDEMIA, UNSPECIFIED HYPERLIPIDEMIA TYPE: ICD-10-CM

## 2019-08-09 DIAGNOSIS — K21.9 GASTROESOPHAGEAL REFLUX DISEASE, ESOPHAGITIS PRESENCE NOT SPECIFIED: ICD-10-CM

## 2019-08-09 DIAGNOSIS — E66.01 MORBID OBESITY: Primary | ICD-10-CM

## 2019-08-09 DIAGNOSIS — F11.90 CHRONIC, CONTINUOUS USE OF OPIOIDS: ICD-10-CM

## 2019-08-09 DIAGNOSIS — G47.33 OSA ON CPAP: ICD-10-CM

## 2019-08-09 DIAGNOSIS — D53.1 COMBINED B12 AND FOLATE DEFICIENCY ANEMIA: ICD-10-CM

## 2019-08-09 NOTE — TELEPHONE ENCOUNTER
Spoke with patient and scheduled preop appts/ surgery date/ post op appts. All dates and times agreed upon. Pt aware that they must have PCP clearance within 60 days of surgery- it should be dated, signed and in chart for preop appointment. Pt aware that protein liquid diet start date is 8/14/19.  Screened patient for history of UTI per protocol.   Discussed with patient to avoid antibiotics and elective procedures involving sedation/anesthesia within 30 days of surgery.  Patient instructed to call the bariatric clinic post op for any s/s of UTI.  Pt aware of all appts can be seen in my [facility] patient portal at this time and appt reminders mailed to patient's address today by RN.  Office phone and fax number given to patient for any future questions/concerns. She verbalized understanding.

## 2019-08-12 ENCOUNTER — OFFICE VISIT (OUTPATIENT)
Dept: ORTHOPEDICS | Facility: CLINIC | Age: 54
End: 2019-08-12
Payer: MEDICARE

## 2019-08-12 VITALS
WEIGHT: 293 LBS | HEIGHT: 65 IN | DIASTOLIC BLOOD PRESSURE: 81 MMHG | BODY MASS INDEX: 48.82 KG/M2 | HEART RATE: 81 BPM | SYSTOLIC BLOOD PRESSURE: 132 MMHG

## 2019-08-12 DIAGNOSIS — M65.312 TRIGGER FINGER OF LEFT THUMB: Primary | ICD-10-CM

## 2019-08-12 DIAGNOSIS — Z47.89 ORTHOPEDIC AFTERCARE: ICD-10-CM

## 2019-08-12 PROCEDURE — 99999 PR PBB SHADOW E&M-EST. PATIENT-LVL IV: CPT | Mod: PBBFAC,,, | Performed by: PHYSICIAN ASSISTANT

## 2019-08-12 PROCEDURE — 99024 PR POST-OP FOLLOW-UP VISIT: ICD-10-PCS | Mod: POP,,, | Performed by: PHYSICIAN ASSISTANT

## 2019-08-12 PROCEDURE — 99999 PR PBB SHADOW E&M-EST. PATIENT-LVL IV: ICD-10-PCS | Mod: PBBFAC,,, | Performed by: PHYSICIAN ASSISTANT

## 2019-08-12 PROCEDURE — 99214 OFFICE O/P EST MOD 30 MIN: CPT | Mod: PBBFAC | Performed by: PHYSICIAN ASSISTANT

## 2019-08-12 PROCEDURE — 99024 POSTOP FOLLOW-UP VISIT: CPT | Mod: POP,,, | Performed by: PHYSICIAN ASSISTANT

## 2019-08-12 NOTE — PROGRESS NOTES
Called and spoke with pt concerning preop liquid diet, post op liquid diet and recommended vitamins and minerals.  Pt v/u.

## 2019-08-12 NOTE — PROGRESS NOTES
"Ms. Thomas is here today for a post-operative visit.  She is 14 days status post left thumb finger A1 pulley release by Dr. Garcia on 7/29/19. She reports that she is doing well.  She has no current pain.  She is not taking pain medication.  She denies fever, chills, and sweats since the time of the surgery.     Physical exam:    Vitals:    08/12/19 0812   BP: 132/81   Pulse: 81   Weight: (!) 156.5 kg (345 lb)   Height: 5' 5" (1.651 m)   PainSc: 0-No pain     Vital signs are stable, patient is afebrile.  Patient is well dressed and well groomed, no acute distress.  Alert and oriented to person, place, and time.  Post op dressing taken down.  Incision is clean, dry and intact.  There is no erythema or exudate.  There is no sign of any infection. She is NVI. Sutures removed without difficulty.  Good finger ROM.    Assessment: 14 days status post left thumb finger A1 pulley release    Plan:  Alivia was seen today for post-op evaluation.    Diagnoses and all orders for this visit:    Trigger finger of left thumb    Orthopedic aftercare        - PO instruction reviewed and provided to patient  - Discussed scar massage  - Follow up in 4 weeks if needed  - Call with questions or concerns        "

## 2019-08-13 ENCOUNTER — OFFICE VISIT (OUTPATIENT)
Dept: CARDIOLOGY | Facility: CLINIC | Age: 54
End: 2019-08-13
Payer: MEDICARE

## 2019-08-13 ENCOUNTER — TELEPHONE (OUTPATIENT)
Dept: INTERNAL MEDICINE | Facility: CLINIC | Age: 54
End: 2019-08-13

## 2019-08-13 VITALS
HEIGHT: 65 IN | BODY MASS INDEX: 48.82 KG/M2 | HEART RATE: 77 BPM | SYSTOLIC BLOOD PRESSURE: 111 MMHG | DIASTOLIC BLOOD PRESSURE: 68 MMHG | OXYGEN SATURATION: 94 % | WEIGHT: 293 LBS

## 2019-08-13 DIAGNOSIS — E78.2 MIXED HYPERLIPIDEMIA: ICD-10-CM

## 2019-08-13 DIAGNOSIS — E11.65 UNCONTROLLED TYPE 2 DIABETES MELLITUS WITH HYPERGLYCEMIA: ICD-10-CM

## 2019-08-13 DIAGNOSIS — Z01.810 PREOPERATIVE CARDIOVASCULAR EXAMINATION: Primary | ICD-10-CM

## 2019-08-13 DIAGNOSIS — E66.01 MORBID OBESITY: ICD-10-CM

## 2019-08-13 PROCEDURE — 99999 PR PBB SHADOW E&M-EST. PATIENT-LVL IV: ICD-10-PCS | Mod: PBBFAC,,, | Performed by: INTERNAL MEDICINE

## 2019-08-13 PROCEDURE — 99204 PR OFFICE/OUTPT VISIT, NEW, LEVL IV, 45-59 MIN: ICD-10-PCS | Mod: S$PBB,,, | Performed by: INTERNAL MEDICINE

## 2019-08-13 PROCEDURE — 99204 OFFICE O/P NEW MOD 45 MIN: CPT | Mod: S$PBB,,, | Performed by: INTERNAL MEDICINE

## 2019-08-13 PROCEDURE — 99214 OFFICE O/P EST MOD 30 MIN: CPT | Mod: PBBFAC,PO | Performed by: INTERNAL MEDICINE

## 2019-08-13 PROCEDURE — 99999 PR PBB SHADOW E&M-EST. PATIENT-LVL IV: CPT | Mod: PBBFAC,,, | Performed by: INTERNAL MEDICINE

## 2019-08-13 NOTE — LETTER
August 13, 2019      Heriberto Clements MD  [address] [facility] [address]           [place] [facility]  [address] [facility].  [place] [address]  Phone: [phone]          Patient: Alivia Thomas   MR Number: [id number]   YOB: 1965   Date of Visit: 8/13/2019       Dear Dr. Heriberto Clements:    Thank you for referring Alivia Thomas to me for evaluation. Attached you will find relevant portions of my assessment and plan of care.    If you have questions, please do not hesitate to call me. I look forward to following Alivia Thomas along with you.    Sincerely,    Jose Ramon Quesada MD    Enclosure  CC:  No Recipients    If you would like to receive this communication electronically, please contact [email or web address] or [phone] to request more information on [employer] access.    For providers and/or their staff who would like to refer a patient to [facility], please contact us through our one-stop-shop provider referral line, [facility], at [phone].    If you feel you have received this communication in error or would no longer like to receive these types of communications, please e-mail [email or web address]

## 2019-08-13 NOTE — PROGRESS NOTES
Subjective:   Chief Complaint: surgery clearance (8/28/2019)  Last Clinic Visit: New Patient    History of Present Illness: Alivia Thomas is a 53 y.o. lady with morbid obesity, diabetes, hyperlipidemia, insulin dependence, who presents for preoperative evaluation prior to gastric bypass surgery.  She has no personal history of cardiovascular events, she is unable to exercise to greater than 4 Mets due to significant dyspnea on exertion secondary to morbid obesity as well as bilateral knee osteoarthritis status post bilateral knee arthroplasty, as well as low back pain.  She denies any chest pain, significant shortness of breath at baseline without activity, no history of stroke or TIA, no history of MI, no history of chronic kidney disease.  She reports well-controlled cholesterol, and diabetes has recently been well controlled as well.  She had a PET stress test earlier this year which was negative for ischemia.    PMHx:  Hyperlipidemia  Diabetes  Morbid obesity    Review of Systems   Constitution: Negative.   HENT: Negative.    Eyes: Negative.    Cardiovascular: Positive for dyspnea on exertion.   Respiratory: Positive for shortness of breath.    Hematologic/Lymphatic: Negative.    Skin: Negative.    Musculoskeletal: Negative.    Gastrointestinal: Negative.    Genitourinary: Negative.      Medications:  Current Outpatient Medications on File Prior to Visit   Medication Sig    aspirin (ECOTRIN) 81 MG EC tablet Take 1 tablet by mouth every morning. prevents heart attacks and strokes    atorvastatin (LIPITOR) 20 MG tablet Take 1 tablet (20 mg total) by mouth once daily.    blood sugar diagnostic Strp Freestyle light strips and lancets    blood sugar diagnostic Strp Check glucose four times daily Strips and lancets covered by insurance E11.9 - One touch    blood-glucose meter kit Check glucose four times daily E11.9 meter covered by insurance One Touch    FLUoxetine 40 MG capsule Take 1 capsule (40 mg total)  "by mouth once daily.    fluticasone (FLONASE) 50 mcg/actuation nasal spray 1 spray by Each Nare route 2 (two) times daily as needed for Rhinitis.    insulin detemir U-100 (LEVEMIR FLEXPEN) 100 unit/mL (3 mL) SubQ InPn pen Inject 40 units every evening    insulin lispro (HUMALOG KWIKPEN INSULIN) 100 unit/mL pen INJECT 11 UNITS SUBCUTANEOUSLY BEFORE MEAL(S) PLUS  SLIDING  SCALE max 63 Units per day    lisinopril (PRINIVIL,ZESTRIL) 2.5 MG tablet TAKE 1 TABLET BY MOUTH DAILY FOR PROTEINURIA    metFORMIN (GLUCOPHAGE-XR) 500 MG 24 hr tablet Take 2 tablets (1,000 mg total) by mouth 2 (two) times daily.    MULTIVIT-IRON-MIN-FOLIC ACID 3,500-18-0.4 UNIT-MG-MG ORAL CHEW Take 1 tablet by mouth once daily.    omeprazole (PRILOSEC) 20 MG capsule Take 1 capsule (20 mg total) by mouth every morning.    pen needle, diabetic 33 gauge x 5/32" Ndle 1 application by Misc.(Non-Drug; Combo Route) route 4 (four) times daily with meals and nightly.    VENTOLIN HFA 90 mcg/actuation inhaler INHALE TWO PUFFS INTO LUNGS EVERY 4 TO 6 HOURS AS NEEDED FOR SHORTNESS OF BREATH AND FOR WHEEZING    vitamin D 185 MG Tab Take 5,000 mg by mouth once daily.    [DISCONTINUED] acetaminophen-codeine 300-30mg (TYLENOL #3) 300-30 mg Tab Take 1 tablet by mouth every 4 to 6 hours as needed (moderate to severe pain).    [DISCONTINUED] celecoxib (CELEBREX) 200 MG capsule Take 1 capsule (200 mg total) by mouth once daily.    [DISCONTINUED] diclofenac sodium (VOLTAREN) 1 % Gel Apply 2 g topically once daily.     No current facility-administered medications on file prior to visit.      Family History:  Alivia's family history includes Cancer in her sister; Cataracts in her father and mother; Coronary artery disease in her brother; Diabetes in her brother, brother, father, mother, and sister; Hypertension in her sister; Hypotension in her brother and brother; Kidney failure in her brother; No Known Problems in her sister.    Social History:  Alivia reports " "that she has never smoked. She has never used smokeless tobacco. She reports that she drinks alcohol. She reports that she does not use drugs.    Objective:   /68   Pulse 77   Ht 5' 5" (1.651 m)   Wt (!) 155.2 kg (342 lb 0.7 oz)   LMP 06/26/2018   SpO2 (!) 94%   BMI 56.92 kg/m²     Physical Exam   Constitutional: She is oriented to person, place, and time and well-developed, well-nourished, and in no distress. No distress.   Morbidly obese   HENT:   Head: Normocephalic and atraumatic.   Mouth/Throat: No oropharyngeal exudate.   Eyes: EOM are normal. No scleral icterus.   Neck: No JVD present. No tracheal deviation present. No thyromegaly present.   Cardiovascular: Normal rate and regular rhythm. Exam reveals no gallop and no friction rub.   No murmur heard.  Pulmonary/Chest: Effort normal and breath sounds normal. No respiratory distress. She has no wheezes. She has no rales. She exhibits no tenderness.   Abdominal: Soft. She exhibits no distension. There is no tenderness. There is no rebound and no guarding.   Musculoskeletal: She exhibits no edema.   Bilateral TKR scars   Neurological: She is alert and oriented to person, place, and time.   Skin: Skin is warm and dry. She is not diaphoretic. No erythema.   Psychiatric: Affect normal.     EKG:  My independent visualization of most recent EKG is Normal sinus rhythm, nonspecific ST changes    TTE:  6/22/2016  CONCLUSIONS     1 - Upper limit of normal left ventricular enlargement.     2 - Normal left ventricular systolic function (EF 60-65%).     3 - Normal left ventricular diastolic function.     4 - Right atrial enlargement.     5 - Trivial tricuspid regurgitation.     6 - Intermediate central venous pressure.     7 - The estimated PA systolic pressure is 39 mmHg.     Lipids:  Recent Labs   Lab 01/23/19  1323   LDL Cholesterol 94.2   HDL 51   Cholesterol 161      Renal:  Recent Labs   Lab 08/12/19  0735   Potassium 3.7   CO2 27   BUN, Bld 12   Creatinine " 0.7     Liver:  Recent Labs   Lab 08/12/19  0735   AST 16   ALT 14     2/17/2019  PET  · The relative PET images are normal showing no clinically significant regional resting or stress induced perfusion defects.  · Gated perfusion images showed an ejection fraction of 77 % at rest and 80 % during stress. Normal is >51%.  · Wall motion was normal at rest and stress.  · LV cavity size is normal at rest and normal at stress.  · The EKG portion of this study is negative for myocardial ischemia.  · Arrhythmias during stress: rare PACs.  · The patient reported headache during the stress test.  Assessment:     1. Preoperative cardiovascular examination    2. Morbid obesity    3. Uncontrolled type 2 diabetes mellitus with hyperglycemia    4. Mixed hyperlipidemia      Plan:   1. Preoperative cardiovascular examination  She has no active cardiac conditions, is unable to exercise to than 4 Mets due to arthritis.  She has no history of stroke, TIA, myocardial infarction or obstructive atherosclerotic disease or chronic kidney disease.  She has revised cardiac risk index factors of intra-abdominal surgery and insulin-dependent diabetes mellitus.  This gives her a 10% risk of major adverse cardiac events the basis based on the 2017 [place] perioperative guidelines (3% risk by old guidelines). Negative PET stress test. Proceed on a risk benefit basis.    2. Morbid obesity  Plan with surgery    3. Uncontrolled type 2 diabetes mellitus with hyperglycemia  Recommend close follow-up with PCP    4. Mixed hyperlipidemia  Recent lipids improving    Follow up if symptoms worsen or fail to improve.

## 2019-08-14 ENCOUNTER — TELEPHONE (OUTPATIENT)
Dept: BARIATRICS | Facility: CLINIC | Age: 54
End: 2019-08-14

## 2019-08-14 ENCOUNTER — TELEPHONE (OUTPATIENT)
Dept: INTERNAL MEDICINE | Facility: CLINIC | Age: 54
End: 2019-08-14

## 2019-08-14 DIAGNOSIS — G89.29 BILATERAL CHRONIC KNEE PAIN: ICD-10-CM

## 2019-08-14 DIAGNOSIS — Z96.651 STATUS POST TOTAL RIGHT KNEE REPLACEMENT: ICD-10-CM

## 2019-08-14 DIAGNOSIS — M47.816 SPONDYLOSIS OF LUMBAR REGION WITHOUT MYELOPATHY OR RADICULOPATHY: ICD-10-CM

## 2019-08-14 DIAGNOSIS — G89.4 CHRONIC PAIN DISORDER: ICD-10-CM

## 2019-08-14 DIAGNOSIS — M47.816 FACET ARTHRITIS OF LUMBAR REGION: ICD-10-CM

## 2019-08-14 DIAGNOSIS — M25.562 BILATERAL CHRONIC KNEE PAIN: ICD-10-CM

## 2019-08-14 DIAGNOSIS — M17.0 PRIMARY OSTEOARTHRITIS OF BOTH KNEES: ICD-10-CM

## 2019-08-14 DIAGNOSIS — M51.36 DDD (DEGENERATIVE DISC DISEASE), LUMBAR: ICD-10-CM

## 2019-08-14 DIAGNOSIS — Z79.891 ENCOUNTER FOR LONG-TERM OPIATE ANALGESIC USE: ICD-10-CM

## 2019-08-14 DIAGNOSIS — M25.561 BILATERAL CHRONIC KNEE PAIN: ICD-10-CM

## 2019-08-14 RX ORDER — OXYCODONE AND ACETAMINOPHEN 10; 325 MG/1; MG/1
1 TABLET ORAL EVERY 8 HOURS PRN
Qty: 90 TABLET | Refills: 0 | Status: SHIPPED | OUTPATIENT
Start: 2019-08-16 | End: 2019-08-16 | Stop reason: SDUPTHER

## 2019-08-14 NOTE — TELEPHONE ENCOUNTER
Pt is on a liquid diet for 2 weeks and she need a some adjustment on her medications due to the liquid diet please advise thanks she want to talk to you to discuss

## 2019-08-14 NOTE — TELEPHONE ENCOUNTER
Called patient to discuss liquid diet and vitamins.    Pt using premier protein shake, water with kristy.  Pt reports cold sweats.  Requested pt call her PCP who manages her diabetes to have a plan for her BS regulation on liquid diet.  Pt reports she will call as soon as she gets off phone.  Pt verbalized understanding of information provided.    Discussion:  -  gms of protein per day  - 600-800 calories per day   - Less than 4gm sugar per shake  - SF, Decaf, non-carbonated Fluids  - No Fruits, juices, yogurt or pudding on liquid diet  - No vitamins or minerals for 1 week prior to surgery    Remind pt per nursing and medical team to inform our department if taking antibiotics within the 30 days post bariatric surgery or it any other surgeries/procedures are scheduled within 30 days after bariatric surgery.    Reminded pt of pre-op and surgery dates.    Pt to call back with any questions.

## 2019-08-14 NOTE — TELEPHONE ENCOUNTER
----- Message from Dana Saldana sent at 8/14/2019  2:56 PM CDT -----  Contact: self/[phone]  Patient called in regards needing to talk with Dr Richardson about patient stated 2 weeks liquid diet before the surgery and needs diabetic medication to be adjust it. Please call and advise. Thank you!!!

## 2019-08-14 NOTE — TELEPHONE ENCOUNTER
----- Message from Gabriella Vidal sent at 2019  9:23 AM CDT -----  Contact: DONTAE MOON [[id number]]  Can the clinic reply in [facility]: no     Please refill the medication(s) listed below. The patient can be reached at this phone number once it is called into the pharmacy. [phone]    Medication #1oxyCODONE-acetaminophen (PERCOCET)      Medication #2    Preferred Pharmacy:[facility] - [address]     Patient stated medication   on

## 2019-08-16 ENCOUNTER — TELEPHONE (OUTPATIENT)
Dept: BARIATRICS | Facility: CLINIC | Age: 54
End: 2019-08-16

## 2019-08-16 DIAGNOSIS — G89.4 CHRONIC PAIN DISORDER: ICD-10-CM

## 2019-08-16 DIAGNOSIS — G89.29 BILATERAL CHRONIC KNEE PAIN: ICD-10-CM

## 2019-08-16 DIAGNOSIS — Z79.891 ENCOUNTER FOR LONG-TERM OPIATE ANALGESIC USE: ICD-10-CM

## 2019-08-16 DIAGNOSIS — M47.816 FACET ARTHRITIS OF LUMBAR REGION: ICD-10-CM

## 2019-08-16 DIAGNOSIS — M25.562 BILATERAL CHRONIC KNEE PAIN: ICD-10-CM

## 2019-08-16 DIAGNOSIS — M25.561 BILATERAL CHRONIC KNEE PAIN: ICD-10-CM

## 2019-08-16 DIAGNOSIS — Z96.651 STATUS POST TOTAL RIGHT KNEE REPLACEMENT: ICD-10-CM

## 2019-08-16 DIAGNOSIS — M47.816 SPONDYLOSIS OF LUMBAR REGION WITHOUT MYELOPATHY OR RADICULOPATHY: ICD-10-CM

## 2019-08-16 DIAGNOSIS — M51.36 DDD (DEGENERATIVE DISC DISEASE), LUMBAR: ICD-10-CM

## 2019-08-16 DIAGNOSIS — M17.0 PRIMARY OSTEOARTHRITIS OF BOTH KNEES: ICD-10-CM

## 2019-08-16 RX ORDER — OXYCODONE AND ACETAMINOPHEN 10; 325 MG/1; MG/1
1 TABLET ORAL EVERY 8 HOURS PRN
Qty: 90 TABLET | Refills: 0 | Status: CANCELLED | OUTPATIENT
Start: 2019-08-16 | End: 2019-09-15

## 2019-08-16 RX ORDER — OXYCODONE AND ACETAMINOPHEN 10; 325 MG/1; MG/1
1 TABLET ORAL EVERY 8 HOURS PRN
Qty: 90 TABLET | Refills: 0 | Status: ON HOLD | OUTPATIENT
Start: 2019-08-16 | End: 2019-08-30 | Stop reason: HOSPADM

## 2019-08-16 NOTE — TELEPHONE ENCOUNTER
"Pt came into pain management office stating she needs her prescription refill for Percocet needs to say "7 day supply medically necessary"    Staff explained to pt that her provider is out of clinic today and it may be a moment until it is re-sent to the pharmacy     Pt requested to wait in the lobby until it has been sent      Last fill 7/18/19  Last visit: 7/16/19  Last uds: 2/26/19  "

## 2019-08-16 NOTE — TELEPHONE ENCOUNTER
Spoke with pt, informed of recent vit D level and Iron level.  Instructed  to continue her vit d and to start taking FeGluconate 125 mg daily in addition to her MVI with added iron. Pt verbalized understanding and instructed to call with any problems or concerns.

## 2019-08-21 ENCOUNTER — TELEPHONE (OUTPATIENT)
Dept: OBSTETRICS AND GYNECOLOGY | Facility: CLINIC | Age: 54
End: 2019-08-21

## 2019-08-21 ENCOUNTER — OFFICE VISIT (OUTPATIENT)
Dept: INTERNAL MEDICINE | Facility: CLINIC | Age: 54
End: 2019-08-21
Payer: MEDICARE

## 2019-08-21 ENCOUNTER — HOSPITAL ENCOUNTER (OUTPATIENT)
Dept: RADIOLOGY | Facility: OTHER | Age: 54
Discharge: HOME | End: 2019-08-21
Attending: OBSTETRICS & GYNECOLOGY
Payer: MEDICARE

## 2019-08-21 ENCOUNTER — OFFICE VISIT (OUTPATIENT)
Dept: PODIATRY | Facility: CLINIC | Age: 54
End: 2019-08-21
Payer: MEDICARE

## 2019-08-21 VITALS
SYSTOLIC BLOOD PRESSURE: 116 MMHG | DIASTOLIC BLOOD PRESSURE: 76 MMHG | WEIGHT: 293 LBS | TEMPERATURE: 98 F | HEART RATE: 96 BPM | HEIGHT: 65 IN | OXYGEN SATURATION: 97 % | BODY MASS INDEX: 48.82 KG/M2

## 2019-08-21 VITALS — BODY MASS INDEX: 48.82 KG/M2 | WEIGHT: 293 LBS | HEIGHT: 65 IN

## 2019-08-21 DIAGNOSIS — F33.41 RECURRENT MAJOR DEPRESSIVE DISORDER, IN PARTIAL REMISSION: ICD-10-CM

## 2019-08-21 DIAGNOSIS — E78.2 MIXED HYPERLIPIDEMIA: ICD-10-CM

## 2019-08-21 DIAGNOSIS — N39.0 URINARY TRACT INFECTION WITHOUT HEMATURIA, SITE UNSPECIFIED: Primary | ICD-10-CM

## 2019-08-21 DIAGNOSIS — J45.909 ASTHMA, UNSPECIFIED ASTHMA SEVERITY, UNSPECIFIED WHETHER COMPLICATED, UNSPECIFIED WHETHER PERSISTENT: ICD-10-CM

## 2019-08-21 DIAGNOSIS — G47.33 OSA ON CPAP: ICD-10-CM

## 2019-08-21 DIAGNOSIS — L84 CORNS AND CALLUS: ICD-10-CM

## 2019-08-21 DIAGNOSIS — B35.1 DERMATOPHYTOSIS OF NAIL: ICD-10-CM

## 2019-08-21 DIAGNOSIS — N95.0 PMB (POSTMENOPAUSAL BLEEDING): Primary | ICD-10-CM

## 2019-08-21 DIAGNOSIS — N95.0 PMB (POSTMENOPAUSAL BLEEDING): ICD-10-CM

## 2019-08-21 DIAGNOSIS — E11.9 TYPE 2 DIABETES MELLITUS WITHOUT RETINOPATHY: ICD-10-CM

## 2019-08-21 DIAGNOSIS — E11.49 TYPE II DIABETES MELLITUS WITH NEUROLOGICAL MANIFESTATIONS: Primary | ICD-10-CM

## 2019-08-21 PROCEDURE — 87086 URINE CULTURE/COLONY COUNT: CPT

## 2019-08-21 PROCEDURE — 99999 PR PBB SHADOW E&M-EST. PATIENT-LVL III: ICD-10-PCS | Mod: PBBFAC,,, | Performed by: PODIATRIST

## 2019-08-21 PROCEDURE — 81001 URINALYSIS AUTO W/SCOPE: CPT

## 2019-08-21 PROCEDURE — 99213 OFFICE O/P EST LOW 20 MIN: CPT | Mod: PBBFAC,25,PN | Performed by: PODIATRIST

## 2019-08-21 PROCEDURE — 11721 DEBRIDE NAIL 6 OR MORE: CPT | Mod: Q9,PBBFAC,PN | Performed by: PODIATRIST

## 2019-08-21 PROCEDURE — 11721 ROUTINE FOOT CARE: ICD-10-PCS | Mod: 59,Q9,S$PBB, | Performed by: PODIATRIST

## 2019-08-21 PROCEDURE — 76856 US EXAM PELVIC COMPLETE: CPT | Mod: 26,,, | Performed by: RADIOLOGY

## 2019-08-21 PROCEDURE — 76830 US PELVIS COMP WITH TRANSVAG NON-OB (XPD): ICD-10-PCS | Mod: 26,,, | Performed by: RADIOLOGY

## 2019-08-21 PROCEDURE — 90686 IIV4 VACC NO PRSV 0.5 ML IM: CPT | Mod: PBBFAC,PO

## 2019-08-21 PROCEDURE — 99214 OFFICE O/P EST MOD 30 MIN: CPT | Mod: S$PBB,,, | Performed by: INTERNAL MEDICINE

## 2019-08-21 PROCEDURE — 93005 ELECTROCARDIOGRAM TRACING: CPT | Mod: PBBFAC,PO,59 | Performed by: INTERNAL MEDICINE

## 2019-08-21 PROCEDURE — 76856 US PELVIS COMP WITH TRANSVAG NON-OB (XPD): ICD-10-PCS | Mod: 26,,, | Performed by: RADIOLOGY

## 2019-08-21 PROCEDURE — 99999 PR PBB SHADOW E&M-EST. PATIENT-LVL IV: CPT | Mod: PBBFAC,,, | Performed by: INTERNAL MEDICINE

## 2019-08-21 PROCEDURE — 99499 UNLISTED E&M SERVICE: CPT | Mod: S$PBB,,, | Performed by: PODIATRIST

## 2019-08-21 PROCEDURE — 99214 PR OFFICE/OUTPT VISIT, EST, LEVL IV, 30-39 MIN: ICD-10-PCS | Mod: S$PBB,,, | Performed by: INTERNAL MEDICINE

## 2019-08-21 PROCEDURE — 99214 OFFICE O/P EST MOD 30 MIN: CPT | Mod: PBBFAC,25,27,PO | Performed by: INTERNAL MEDICINE

## 2019-08-21 PROCEDURE — 76830 TRANSVAGINAL US NON-OB: CPT | Mod: 26,,, | Performed by: RADIOLOGY

## 2019-08-21 PROCEDURE — 93010 EKG 12-LEAD: ICD-10-PCS | Mod: S$PBB,,, | Performed by: INTERNAL MEDICINE

## 2019-08-21 PROCEDURE — 11056 ROUTINE FOOT CARE: ICD-10-PCS | Mod: Q9,S$PBB,, | Performed by: PODIATRIST

## 2019-08-21 PROCEDURE — 93010 ELECTROCARDIOGRAM REPORT: CPT | Mod: S$PBB,,, | Performed by: INTERNAL MEDICINE

## 2019-08-21 PROCEDURE — 99999 PR PBB SHADOW E&M-EST. PATIENT-LVL IV: ICD-10-PCS | Mod: PBBFAC,,, | Performed by: INTERNAL MEDICINE

## 2019-08-21 PROCEDURE — 11056 PARNG/CUTG B9 HYPRKR LES 2-4: CPT | Mod: Q9,S$PBB,, | Performed by: PODIATRIST

## 2019-08-21 PROCEDURE — 76830 TRANSVAGINAL US NON-OB: CPT | Mod: TC

## 2019-08-21 PROCEDURE — 99999 PR PBB SHADOW E&M-EST. PATIENT-LVL III: CPT | Mod: PBBFAC,,, | Performed by: PODIATRIST

## 2019-08-21 PROCEDURE — 99499 NO LOS: ICD-10-PCS | Mod: S$PBB,,, | Performed by: PODIATRIST

## 2019-08-21 NOTE — PATIENT INSTRUCTIONS
How to Check Your Feet    Below are tips to help you look for foot problems. Try to check your feet at the same time each day, such as when you get out of bed in the morning.    · Check the top of each foot. The tops of toes, back of the heel, and outer edge of the foot can get a lot of rubbing from poor-fitting shoes.    · Check the bottom of each foot. Daily wear and tear often leads to problems at pressure spots.    · Check the toes and nails. Fungal infections often occur between toes. Toenail problems can also be a sign of fungal infections or lead to breaks in the skin.    · Check your shoes, too. Loose objects inside a shoe can injure the foot. Use your hand to feel inside your shoes for things like alie, loose stitching, or rough areas that could irritate your skin.        Diabetic Foot Care    Diabetes can lead to a number of different foot complications. Fortunately, most of these complications can be prevented with a little extra foot care. If diabetes is not well controlled, the high blood sugar can cause damage to blood vessels and result in poor circulation to the foot. When the skin does not get enough blood flow, it becomes prone to pressure sores and ulcers, which heal slowly.  High blood sugar can also damage nerves, interfering with the ability to feel pain and pressure. When you cant feel your foot normally, it is easy to injure your skin, bones and joints without knowing it. For these reasons diabetes increases the risk of fungal infections, bunions and ulcers. Deep ulcers can lead to bone infection. Gangrene is the most serious foot complication of diabetes. It usually occurs on the tips of the toes as blacked areas of skin. The black area is dead tissue. In severe cases, gangrene spreads to involve the entire toe, other toes and the entire foot. Foot or toe amputation may be required. Good foot care and blood sugar control can prevent this.    Home Care  1. Wear comfortable, proper fitting  shoes.  2. Wash your feet daily with warm water and mild soap.  3. After drying, apply a moisturizing cream or lotion.  4. Check your feet daily for skin breaks, blisters, swelling, or redness. Look between your toes also.  5. Wear cotton socks and change them every day.  6. Trim toe nails carefully and do not cut your cuticles.  7. Strive to keep your blood sugar under control with a combination of medicines, diet and activity.  8. If you smoke and have diabetes, it is very important that you stop. Smoking reduces blood flow to your foot.  9. Avoid activities that increase your risk of foot injury:  · Do not walk barefoot.  · Do not use heating pads or hot water bottles on your feet.  · Do not put your foot in a hot tub without first checking the temperature with your hand.  10) Schedule yearly foot exams.    Follow Up  with your doctor or as advised by our staff. Report any cut, puncture, scrape, other injury, blister, ingrown toenail or ulcer on your foot.    Get Prompt Medical Attention  if any of the following occur:  -- Open ulcer with pus draining from the wound  -- Increasing foot or leg pain  -- New areas of redness or swelling or tender areas of the foot    © 5307-2915 [employer]. [address] [place] [address], [place] [address]. All rights reserved. This information is not intended as a substitute for professional medical care. Always follow your healthcare professional's instructions.

## 2019-08-21 NOTE — PROGRESS NOTES
"Chief Complaint   Patient presents with    Diabetes Mellitus     A1C 8/12/19 6.9   PCP last seen on 5/6/19    Nail Problem     3 months         HPI:   The patient is a 53 y.o.  female  who presents for a diabetic foot exam.   Patient reports + presence of abnormal sensation to the feet, just sometimes, mostly at night.   Patient has no history of wounds on the feet.   Hx of foot surgery: none.   Shoe gear: casual shoes   This patient has documented high risk feet requiring routine maintenance secondary to diabetes.    Main concern today is need for toenail trimming. Routine trimming helps.  Foot exam due 5/2020            Primary care doctor is: Clarisse Richardson MD  Patient last saw primary care doctor on:    Chief Complaint   Patient presents with    Diabetes Mellitus     A1C 8/12/19 6.9   PCP last seen on 5/6/19    Nail Problem     3 months           Vitals:    08/21/19 1224   Weight: (!) 155.1 kg (342 lb)   Height: 5' 5" (1.651 m)   PainSc: 0-No pain             Past Medical History:   Diagnosis Date    Allergy     Anemia     Asthma     Depression 1/28/2019    Diabetes mellitus type II     DJD (degenerative joint disease) of knee 6/19/2014    Facet arthritis of lumbar region 12/17/2015    Facet syndrome 12/17/2015    GERD (gastroesophageal reflux disease)     Heartburn     Hyperlipidemia     Hypertension     Morbid obesity     Neuromuscular disorder     Sacroiliitis 6/13/2018    Sleep apnea          Current Outpatient Medications on File Prior to Visit   Medication Sig Dispense Refill    aspirin (ECOTRIN) 81 MG EC tablet Take 1 tablet by mouth every morning. prevents heart attacks and strokes      atorvastatin (LIPITOR) 20 MG tablet Take 1 tablet (20 mg total) by mouth once daily. 90 tablet 3    blood sugar diagnostic Strp Freestyle light strips and lancets 100 each 6    blood sugar diagnostic Strp Check glucose four times daily Strips and lancets covered by insurance E11.9 - One touch " "120 each 6    blood-glucose meter kit Check glucose four times daily E11.9 meter covered by insurance One Touch 1 each 0    fluticasone (FLONASE) 50 mcg/actuation nasal spray 1 spray by Each Nare route 2 (two) times daily as needed for Rhinitis. 15 g 0    insulin detemir U-100 (LEVEMIR FLEXPEN) 100 unit/mL (3 mL) SubQ InPn pen Inject 40 units every evening 36 mL 6    insulin lispro (HUMALOG KWIKPEN INSULIN) 100 unit/mL pen INJECT 11 UNITS SUBCUTANEOUSLY BEFORE MEAL(S) PLUS  SLIDING  SCALE max 63 Units per day 1 Box 6    metFORMIN (GLUCOPHAGE-XR) 500 MG 24 hr tablet Take 2 tablets (1,000 mg total) by mouth 2 (two) times daily. 360 tablet 3    MULTIVIT-IRON-MIN-FOLIC ACID 3,500-18-0.4 UNIT-MG-MG ORAL CHEW Take 1 tablet by mouth once daily.      omeprazole (PRILOSEC) 20 MG capsule Take 1 capsule (20 mg total) by mouth every morning. 90 capsule 3    oxyCODONE-acetaminophen (PERCOCET)  mg per tablet Take 1 tablet by mouth every 8 (eight) hours as needed for Pain. Opioid medication required for more than 7 day supply, medically necessary 90 tablet 0    pen needle, diabetic 33 gauge x 5/32" Ndle 1 application by Misc.(Non-Drug; Combo Route) route 4 (four) times daily with meals and nightly. 100 each 6    VENTOLIN HFA 90 mcg/actuation inhaler INHALE TWO PUFFS INTO LUNGS EVERY 4 TO 6 HOURS AS NEEDED FOR SHORTNESS OF BREATH AND FOR WHEEZING 18 each 0    vitamin D 185 MG Tab Take 5,000 mg by mouth once daily.      FLUoxetine 40 MG capsule Take 1 capsule (40 mg total) by mouth once daily. 90 capsule 3     No current facility-administered medications on file prior to visit.        Review of patient's allergies indicates:  No Known Allergies          Social History     Socioeconomic History    Marital status:      Spouse name: Not on file    Number of children: Not on file    Years of education: Not on file    Highest education level: Not on file   Occupational History    Not on file   Social Needs "    Financial resource strain: Not on file    Food insecurity:     Worry: Not on file     Inability: Not on file    Transportation needs:     Medical: Not on file     Non-medical: Not on file   Tobacco Use    Smoking status: Never Smoker    Smokeless tobacco: Never Used   Substance and Sexual Activity    Alcohol use: Yes     Comment: occasionally     Drug use: No    Sexual activity: Yes     Partners: Female     Birth control/protection: None   Lifestyle    Physical activity:     Days per week: Not on file     Minutes per session: Not on file    Stress: Not on file   Relationships    Social connections:     Talks on phone: Not on file     Gets together: Not on file     Attends Religion service: Not on file     Active member of club or organization: Not on file     Attends meetings of clubs or organizations: Not on file     Relationship status: Not on file   Other Topics Concern    Not on file   Social History Narrative    Disabled. The patient is the youngest of 6 siblings. Single. Lives with single-sex partner.                        ROS:  General ROS: negative  Respiratory ROS: no cough, shortness of breath, or wheezing  Cardiovascular ROS: no chest pain or dyspnea on exertion  Musculoskeletal ROS: negative  Neurological ROS:   negative for - gait disturbance, + numbness/tingling, seizures or tremors  Dermatological ROS: + nail changes, dry skin          LAST HbA1c:   Hemoglobin A1C   Date Value Ref Range Status   08/12/2019 6.9 (H) 4.0 - 5.6 % Final     Comment:     ADA Screening Guidelines:  5.7-6.4%  Consistent with prediabetes  >or=6.5%  Consistent with diabetes  High levels of fetal hemoglobin interfere with the HbA1C  assay. Heterozygous hemoglobin variants (HbS, HgC, etc)do  not significantly interfere with this assay.   However, presence of multiple variants may affect accuracy.     01/23/2019 7.5 (H) 4.0 - 5.6 % Final     Comment:     ADA Screening Guidelines:  5.7-6.4%  Consistent with  "prediabetes  >or=6.5%  Consistent with diabetes  High levels of fetal hemoglobin interfere with the HbA1C  assay. Heterozygous hemoglobin variants (HbS, HgC, etc)do  not significantly interfere with this assay.   However, presence of multiple variants may affect accuracy.     08/20/2018 10.7 (H) 4.0 - 5.6 % Final     Comment:     ADA Screening Guidelines:  5.7-6.4%  Consistent with prediabetes  >or=6.5%  Consistent with diabetes  High levels of fetal hemoglobin interfere with the HbA1C  assay. Heterozygous hemoglobin variants (HbS, HgC, etc)do  not significantly interfere with this assay.   However, presence of multiple variants may affect accuracy.           EXAM:   Vitals:    08/21/19 1224   Weight: (!) 155.1 kg (342 lb)   Height: 5' 5" (1.651 m)       General: Pt. is well-developed, well-nourished, appears stated age, in no acute distress, alert and oriented x 3.      Vascular: Dorsalis pedis and posterior tibial pulses are 2/4 bilaterally. 3 secs capillary refill time and toes are warm to touch.  There is reduced hair growth on the feet.  There is 1+ edema.  no varicosities.      Neurological:  Light touch, proprioception, and sharp/dull sensation are all intact bilaterally. Protective threshold with the Zimmerman-Lindsay monofilament is diminished bilaterally.     Dermatological:  The skin is intact, warm.  The toenails  1-5 L and 1-5 R  are greenish- yellow, long by 5-6mm and thick by 2-3mm with subungual debris  .   There are no open wounds. There is no ecchymoses.  no erythema noted.   Skin diffusely dry on the soles     Interdigital spaces are intact, no fissures    Skin atrophic, thin, dry and mildly hyperpigmented.     Musculoskeletal:  Muscle strength is 5/5 in all groups bilaterally.  Metatarsophalangeal, subtalar, and ankle range of motion are intact without crepitus.           Assessment / Plan:    Problem List Items Addressed This Visit     None      Visit Diagnoses     Type II diabetes mellitus with " neurological manifestations    -  Primary    Dermatophytosis of nail        Corns and callus              I counseled the patient on the patient's conditions, their implications and medical management.     Shoe inspection. Diabetic Foot Education. Patient reminded of the importance of good nutrition and blood sugar control to help prevent podiatric complications of diabetes. Patient instructed on proper foot hygiene. We discussed wearing proper shoe gear, daily foot inspections, never walking without protective shoe gear, never putting sharp instruments to feet, and routine diabetic foot exam and care every 3-6 months to prevent complications.      Discussed regular and routine moisturizer to skin of both feet to help improve dry skin. Advised to apply twice daily until resolution of symptoms. Avoid between toes.     Routine Foot Care  Date/Time: 8/21/2019 2:04 PM  Performed by: Stacey Rowland DPM  Authorized by: Stacey Rowland DPM     Consent Done?:  Yes (Verbal)  Hyperkeratotic Skin Lesions?: Yes    Number of trimmed lesions:  2  Location(s):  Left Plantar and Right Plantar    Nail Care Type:  Debride  Location(s): All  (Left 1st Toe, Left 3rd Toe, Left 2nd Toe, Left 4th Toe, Left 5th Toe, Right 1st Toe, Right 2nd Toe, Right 3rd Toe, Right 4th Toe and Right 5th Toe)  Patient tolerance:  Patient tolerated the procedure well with no immediate complications     With patient's permission, the toenails mentioned above were aggressively reduced and debrided using a nail nipper, removing all offending nail and debris. Utilizing a #15 scalpel, I trimmed the corns and calluses at the above mentioned location.      The patient will continue to monitor the areas daily, inspect the feet, wear protective shoe gear when ambulatory, and moisturizer to maintain skin integrity.    This patient has documented high risk feet requiring routine maintenance secondary to diabetes     follow up 3-4 months or sooner if concerned.

## 2019-08-21 NOTE — TELEPHONE ENCOUNTER
I left a message for the pt with the date and time of her pelvic ultrasound today [facility]

## 2019-08-21 NOTE — TELEPHONE ENCOUNTER
----- Message from Leeanne Silva MD sent at 8/21/2019  2:45 PM CDT -----  Contact: Clarisse Richardson MD  Please schedule US today & appt to see me on tomorrow     ----- Message -----  From: Clarisse Richardson MD  Sent: 8/21/2019   2:21 PM  To: Leeanne Silva MD    Hi,    MsJuan A Shearer had not had a cycle for over a year and is now bleeding  - she is having gastric sleeve surgery in one week.  Can you please work her in somewhere - thanks so much - have a good week!    Clarisse

## 2019-08-22 ENCOUNTER — PROCEDURE VISIT (OUTPATIENT)
Dept: OBSTETRICS AND GYNECOLOGY | Facility: CLINIC | Age: 54
End: 2019-08-22
Payer: MEDICARE

## 2019-08-22 ENCOUNTER — TELEPHONE (OUTPATIENT)
Dept: BARIATRICS | Facility: CLINIC | Age: 54
End: 2019-08-22

## 2019-08-22 VITALS — WEIGHT: 293 LBS | BODY MASS INDEX: 48.82 KG/M2 | HEIGHT: 65 IN

## 2019-08-22 DIAGNOSIS — N95.0 PMB (POSTMENOPAUSAL BLEEDING): Primary | ICD-10-CM

## 2019-08-22 LAB
B-HCG UR QL: NEGATIVE
BACTERIA #/AREA URNS AUTO: ABNORMAL /HPF
BILIRUB UR QL STRIP: NEGATIVE
CLARITY UR REFRACT.AUTO: ABNORMAL
COLOR UR AUTO: ABNORMAL
CTP QC/QA: YES
GLUCOSE UR QL STRIP: NEGATIVE
HGB UR QL STRIP: NEGATIVE
KETONES UR QL STRIP: ABNORMAL
LEUKOCYTE ESTERASE UR QL STRIP: NEGATIVE
MICROSCOPIC COMMENT: ABNORMAL
NITRITE UR QL STRIP: NEGATIVE
PH UR STRIP: 5 [PH] (ref 5–8)
PROT UR QL STRIP: NEGATIVE
RBC #/AREA URNS AUTO: 2 /HPF (ref 0–4)
SP GR UR STRIP: 1.02 (ref 1–1.03)
SQUAMOUS #/AREA URNS AUTO: 8 /HPF
URN SPEC COLLECT METH UR: ABNORMAL
WBC #/AREA URNS AUTO: 7 /HPF (ref 0–5)

## 2019-08-22 PROCEDURE — 81025 URINE PREGNANCY TEST: CPT | Mod: PBBFAC | Performed by: OBSTETRICS & GYNECOLOGY

## 2019-08-22 PROCEDURE — 88305 TISSUE EXAM BY PATHOLOGIST: CPT | Mod: 26,,, | Performed by: PATHOLOGY

## 2019-08-22 PROCEDURE — 88305 TISSUE SPECIMEN TO PATHOLOGY, OBSTETRICS/GYNECOLOGY: ICD-10-PCS | Mod: 26,,, | Performed by: PATHOLOGY

## 2019-08-22 PROCEDURE — 58100 BIOPSY OF UTERUS LINING: CPT | Mod: PBBFAC | Performed by: OBSTETRICS & GYNECOLOGY

## 2019-08-22 PROCEDURE — 58100 BIOPSY (GYNECOLOGICAL): ICD-10-PCS | Mod: S$PBB,,, | Performed by: OBSTETRICS & GYNECOLOGY

## 2019-08-22 PROCEDURE — 88305 TISSUE EXAM BY PATHOLOGIST: CPT | Performed by: PATHOLOGY

## 2019-08-22 NOTE — TELEPHONE ENCOUNTER
Spoke to representative at [facility] for explanation on why the secondary was denied. She states that from the notes it looks like the is an exclusion for the code that was placed, states that we should check with our  and re-submit for approval with new coding.     Regan Garcia PA-C

## 2019-08-23 ENCOUNTER — TELEPHONE (OUTPATIENT)
Dept: INTERNAL MEDICINE | Facility: CLINIC | Age: 54
End: 2019-08-23

## 2019-08-23 ENCOUNTER — TELEPHONE (OUTPATIENT)
Dept: BARIATRICS | Facility: CLINIC | Age: 54
End: 2019-08-23

## 2019-08-23 LAB — BACTERIA UR CULT: NORMAL

## 2019-08-23 NOTE — TELEPHONE ENCOUNTER
----- Message from Chandler Mcleod sent at 8/23/2019  2:32 PM CDT -----  Contact: Patient @ [phone]  Patient returning a missed call from tea Rosas return call

## 2019-08-23 NOTE — TELEPHONE ENCOUNTER
----- Message from Dana Saldana sent at 8/23/2019 10:39 AM CDT -----  Contact: self/[phone]  Patient called in regards needing to talk with Dr Richardson about bariatric clinic is asking for clearance for surgery on 08/28/19. Please call and advise. Thank you!!!

## 2019-08-23 NOTE — TELEPHONE ENCOUNTER
Spoke with pt, she decided to go with the Sleeve. Will make changes to schedule. She will see Dr. Clements Tuesday 8/27 2:30 to sign new consents. Pt aware and verbalized understanding.

## 2019-08-23 NOTE — TELEPHONE ENCOUNTER
Spoke with pt. Explained that [employer] is an exclusion for her secondary policy.  After discussion with Serge Garcia PA-C and past insurance information from the chart, I explained the Sleeve could possibly be covered. She is leaning toward keeping it a LRNY and setting up the Care Credit with the .  After discussing both procedures, she will think about it over the weekend and call us first thing Monday am. I explained the importance of needing a decision early Monday morning to coordinate any changes and keep her surgery date of 8/28/19 as scheduled.

## 2019-08-23 NOTE — TELEPHONE ENCOUNTER
----- Message from Tanika Hood sent at 8/23/2019  4:47 PM CDT -----  Contact: pt  The pt is calling to speak with nurse Alison. The pt did not state what her call was regarding.    Communication preference: [phone]

## 2019-08-26 PROBLEM — J45.909 ASTHMA: Status: ACTIVE | Noted: 2019-08-26

## 2019-08-26 NOTE — PROGRESS NOTES
Subjective:       Patient ID: Alivia Thomas is a 53 y.o. female.    Chief Complaint: Follow-up    HPIPt is well known to me - plans bariatric surgery next week.  Has lost 30 pounds on liquid diet.  No CP or SOB.  Pt denies any problems with anesthesia in the past. Pt reports no history of bleeding diathesis, no significant neck DJD, and no steroids in the last year.  Pt stopped insulin recently due to low blood sugar since 30 pound weight loss.     Review of Systems   Respiratory: Negative for shortness of breath (PND or orthopnea).    Cardiovascular: Negative for chest pain (arm pain or jaw pain).   Gastrointestinal: Negative for abdominal pain, diarrhea, nausea and vomiting.   Genitourinary: Negative for dysuria.   Neurological: Negative for seizures, syncope and headaches.       Objective:      Physical Exam   Constitutional: She is oriented to person, place, and time. She appears well-developed and well-nourished. No distress.   HENT:   Head: Normocephalic.   Mouth/Throat: Oropharynx is clear and moist.   Neck: Neck supple. No JVD present. No thyromegaly present.   Cardiovascular: Normal rate, regular rhythm, normal heart sounds and intact distal pulses. Exam reveals no gallop and no friction rub.   No murmur heard.  Pulmonary/Chest: Effort normal and breath sounds normal. She has no wheezes. She has no rales.   Abdominal: Soft. Bowel sounds are normal. She exhibits no distension and no mass. There is no tenderness. There is no rebound and no guarding.   Musculoskeletal: She exhibits no edema.   Lymphadenopathy:     She has no cervical adenopathy.   Neurological: She is alert and oriented to person, place, and time. She has normal reflexes.   Skin: Skin is warm and dry.   Psychiatric: She has a normal mood and affect. Her behavior is normal. Judgment and thought content normal.       Assessment:       1. Urinary tract infection without hematuria, site unspecified    2. Mixed hyperlipidemia    3. Recurrent  major depressive disorder, in partial remission    4. Type 2 diabetes mellitus without retinopathy - Both Eyes    5. PADDY on CPAP        Plan:   Urinary tract infection without hematuria, site unspecified  -     Urinalysis  -     Urine culture    Mixed hyperlipidemia  Controlled - continue current meds perioperatively    Recurrent major depressive disorder, in partial remission  Controlled - continue current meds perioperatively  Type 2 diabetes mellitus without retinopathy - Both Eyes  Controlled - continue only metformin perioperatively    PADDY on CPAP  CPAP 11cm H2O - use perioperatively  Other orders  -     Influenza - Quadrivalent (3 years & older) (PF)  -     Urinalysis Microscopic  Asthma  Controlled - continue current meds  May need periop albuterol nebs  GERD  periop PPI    Stop lisinopril - feeling dizzy (was used only for kidney protection but diabetes resolving with weight loss and it is dropping her pressure too low)    CBC, CMP WNL  HgbA1c 6.9  U/A neg   CXR WNL  EKG - NSR , nl EKG  PET stress - neg Feb 2019    Pt is at acceptable risk for anesthesia and surgery and at low risk for cardio-pulmonary complications.

## 2019-08-26 NOTE — PROCEDURES
Biopsy (Gynecological)  Performed by: Leeanne Silva MD  Authorized by: Leeanne Silva MD     Consent Done?:  Yes (Written)   Patient was prepped and draped in the normal sterile fashion.  Local anesthesia used?: No      Biopsy Location:  Uterus  Estimated blood loss (cc):  10   Patient tolerated the procedure well with no immediate complications.     Pelvic rest for 2 weeks. Bleeding, pain and fever precautions given.      In Bx for postmenopausal bleeding and thickened endometrial stripe on pelvic ultrasound.

## 2019-08-27 ENCOUNTER — TELEPHONE (OUTPATIENT)
Dept: BARIATRICS | Facility: CLINIC | Age: 54
End: 2019-08-27

## 2019-08-27 ENCOUNTER — OFFICE VISIT (OUTPATIENT)
Dept: BARIATRICS | Facility: CLINIC | Age: 54
DRG: 621 | End: 2019-08-27
Payer: MEDICARE

## 2019-08-27 ENCOUNTER — ANESTHESIA EVENT (OUTPATIENT)
Dept: SURGERY | Facility: HOSPITAL | Age: 54
DRG: 621 | End: 2019-08-27
Payer: MEDICARE

## 2019-08-27 PROCEDURE — 99999 PR PBB SHADOW E&M-EST. PATIENT-LVL I: ICD-10-PCS | Mod: PBBFAC,,, | Performed by: SURGERY

## 2019-08-27 PROCEDURE — 99499 UNLISTED E&M SERVICE: CPT | Mod: S$PBB,,, | Performed by: SURGERY

## 2019-08-27 PROCEDURE — 99999 PR PBB SHADOW E&M-EST. PATIENT-LVL I: CPT | Mod: PBBFAC,,, | Performed by: SURGERY

## 2019-08-27 PROCEDURE — 99499 NO LOS: ICD-10-PCS | Mod: S$PBB,,, | Performed by: SURGERY

## 2019-08-27 PROCEDURE — 99211 OFF/OP EST MAY X REQ PHY/QHP: CPT | Mod: PBBFAC | Performed by: SURGERY

## 2019-08-27 RX ORDER — MULTIVIT WITH MINERALS/HERBS
1 TABLET ORAL EVERY OTHER DAY
COMMUNITY

## 2019-08-27 RX ORDER — LANOLIN ALCOHOL/MO/W.PET/CERES
100 CREAM (GRAM) TOPICAL EVERY MORNING
COMMUNITY

## 2019-08-27 NOTE — PRE-PROCEDURE INSTRUCTIONS
Spoke with Patient.  NPO, medication, and pre-op instructions reviewed.  Denies previous problems with Anesthesia.  Is currently taking Vitamins.  Instructed to stop taking Vitamins before surgery.  Aspirin is on Hold.  Medications verified against the medicine bottles.    Coughing during the interview.  Stated that she does not feel bad.  Afebrile.  Has a sore throat.  Instructed that she can treat her symptoms as long as the medication does not contain Aspirin.    Stated that her morning glucose readings have been 101-120.  Stated that her PCP discontinued her Insulin.  Has chronic pain in her knees and back.  Fell a week ago.  Her knee gave out on her and she was able to catch herself.  Stated that she has a C-PAP machine, but does not use it.  Verbalized understanding of instructions.

## 2019-08-27 NOTE — TELEPHONE ENCOUNTER
Polina Hong RN, called patient and informed her that auth was not needed for the sleeve from secondary insurance as it was a covered benefit and that is was ok to proceed with surgery.  Patient coming in to sign sleeve consent

## 2019-08-27 NOTE — TELEPHONE ENCOUNTER
Per Axel verification of auth approval [facility] will cover the financial obligation as secondary.     Thanks    From: Laquita Chatman <[email or web address]>   Sent: Tuesday, August 27, 2019 11:37 AM  To: Shannon Lord [email or web address]>; Florin Nix <[email or web address]>; José Antonio Chisholm <[email or web address]>; Polina Hong <[email or web address]>  Cc: Shayy Rufus <[email or web address]>; Charmaine Ramos <[email or web address]>  Subject: RE: MRN [id number] Alivia Thomas.    Thanks Jac Chou, is she ok to proceed with surgery without any financial obligation?    Laquita    From: Shannon Lord <[email or web address]>   Sent: Tuesday, August 27, 2019 10:42 AM  To: Florin Boyd <[email or web address]>; Laquita Chatman <[email or web address]>; José Antonio Chisholm [email or web address]>; Polina Hong <[email or web address]>  Cc: Shayy Rufus [email or web address]>; Charmaine Ramos <[email or web address]>  Subject: RE: MRN [id number] Alivia Thomas.    Awesome news, she will be coming it to sign consents today.  I assume that she does not have a financial obligation at todays pre-op visit, correct?  Thanks,  Ciara

## 2019-08-27 NOTE — ANESTHESIA PREPROCEDURE EVALUATION
[facility]  Anesthesia Pre-Operative Evaluation         Patient Name: Alivia Thomas  YOB: 1965  MRN: [id number]    SUBJECTIVE:     Pre-operative evaluation for Procedure(s) (LRB):  GASTRECTOMY, SLEEVE, LAPAROSCOPIC with intraop EGD (N/A)     08/27/2019    Alivia Thomas is a 53 y.o. female w/ a significant PMHx of morbid obesity, PADDY, DM, asthma, GERD, and HLD.    Patient now presents for the above procedure(s).      LDA: None documented.       Prev airway: None documented.    Drips: None documented.      Patient Active Problem List   Diagnosis    Morbid obesity    PADDY on CPAP    Type 2 diabetes mellitus, uncontrolled    Nuclear sclerosis - Both Eyes    Hyperlipemia    Proteinuria    Bilateral knee pain    DJD (degenerative joint disease) of knee    Debility    Prosthetic joint implant failure    Failed total knee arthroplasty    Right knee pain    Knee pain    S/P total knee replacement    Lumbago    CTS (carpal tunnel syndrome)    Right carpal tunnel syndrome    Chronic, continuous use of opioids    Mild intermittent asthma without complication    Left arm pain    Left shoulder pain    Allergic reaction to food    DDD (degenerative disc disease), cervical    Cervical radiculopathy    Cervical spondylosis    Cubital tunnel syndrome on right    Bilateral shoulder bursitis    Cervical stenosis of spinal canal    DDD (degenerative disc disease), thoracic    Thoracic spondylosis    Spondylolisthesis of lumbar region    Lumbar spondylosis    Spondylolisthesis of cervical region    Cervical spine instability    Myeloradiculopathy    Neural foraminal stenosis of cervical spine    DDD (degenerative disc disease), lumbar    Idiopathic scoliosis of thoracolumbar spine    Bilateral shoulder region arthritis    Impingement syndrome of right shoulder    Trochanteric bursitis of both hips    Chronic pain    Depression    Recurrent major depressive  disorder, in partial remission    GERD (gastroesophageal reflux disease)    Trigger finger    Dyspnea    BMI 50.0-59.9, adult    Asthma       Review of patient's allergies indicates:   Allergen Reactions    Strawberry Anaphylaxis       Current Inpatient Medications:      No current facility-administered medications on file prior to encounter.      Current Outpatient Medications on File Prior to Encounter   Medication Sig Dispense Refill    b complex vitamins tablet Take 1 tablet by mouth every morning.      cyanocobalamin (VITAMIN B-12) 1000 MCG tablet Take 100 mcg by mouth every morning.      FERROUS GLUCONATE ORAL Take 150 mg by mouth daily with dinner or evening meal.      aspirin (ECOTRIN) 81 MG EC tablet Take 1 tablet by mouth every morning. prevents heart attacks and strokes      atorvastatin (LIPITOR) 20 MG tablet Take 1 tablet (20 mg total) by mouth once daily. (Patient taking differently: Take 20 mg by mouth every morning. ) 90 tablet 3    blood sugar diagnostic Strp Freestyle light strips and lancets 100 each 6    blood sugar diagnostic Strp Check glucose four times daily Strips and lancets covered by insurance E11.9 - One touch 120 each 6    blood-glucose meter kit Check glucose four times daily E11.9 meter covered by insurance One Touch 1 each 0    FLUoxetine 40 MG capsule Take 1 capsule (40 mg total) by mouth once daily. (Patient taking differently: Take 40 mg by mouth every morning. ) 90 capsule 3    fluticasone (FLONASE) 50 mcg/actuation nasal spray 1 spray by Each Nare route 2 (two) times daily as needed for Rhinitis. 15 g 0    metFORMIN (GLUCOPHAGE-XR) 500 MG 24 hr tablet Take 2 tablets (1,000 mg total) by mouth 2 (two) times daily. 360 tablet 3    MULTIVIT-IRON-MIN-FOLIC ACID 3,500-18-0.4 UNIT-MG-MG ORAL CHEW Take 1 tablet by mouth 2 (two) times daily.       omeprazole (PRILOSEC) 20 MG capsule Take 1 capsule (20 mg total) by mouth every morning. 90 capsule 3    pen needle,  "diabetic 33 gauge x 5/32" Ndle 1 application by Misc.(Non-Drug; Combo Route) route 4 (four) times daily with meals and nightly. 100 each 6    VENTOLIN HFA 90 mcg/actuation inhaler INHALE TWO PUFFS INTO LUNGS EVERY 4 TO 6 HOURS AS NEEDED FOR SHORTNESS OF BREATH AND FOR WHEEZING 18 each 0    vitamin D 185 MG Tab Take 5,000 mg by mouth every morning.          Past Surgical History:   Procedure Laterality Date    ABLATION-RADIOFREQUENCY Left 6/18/2019    [facility] at [facility]    BLOCK, NERVE, INTERCOSTAL, SINGLE Left 4/2/2014    [facility]    BLOCK-NERVE-MEDIAL BRANCH-LUMBAR Bilateral 3/1/2016    [facility] at [facility]    BLOCK-NERVE-MEDIAL BRANCH-LUMBAR Bilateral 2/16/2016    [facility] at [facility]    CARPAL TUNNEL RELEASE      CARPAL TUNNEL RELEASE  1980s    left    CARPAL TUNNEL RELEASE  2012    right    EGD (ESOPHAGOGASTRODUODENOSCOPY) N/A 3/27/2019    Performed by [facility] at [facility] (2ND FLR)    ESOPHAGOGASTRODUODENOSCOPY (EGD) N/A 12/19/2016    Performed by Gulshan [facility]oud [facility] at [facility] (2ND FLR)    JOINT REPLACEMENT Bilateral     with 2 revisions on rt    KNEE SURGERY  3/2010    orthroscope    KNEE SURGERY  6-19-14    left TKR    RADIOFREQUENCY ABLATION Right 7/9/2019    [facility] at [facility]    Radiofrequency Ablation LEFT L2,3,4,5 RFA Left 12/27/2018    [facility] at [facility]    Radiofrequency Ablation RIGHT LUMBAR L2,3,4,5 RFA Right 1/29/2019    [facility] at [facility]    RADIOFREQUENCY ABLATION, NERVE, MEDIAL BRANCH, LUMBAR, 1 LEVEL Right 7/10/2018    [facility] at [facility]    RADIOFREQUENCY ABLATION, NERVE, MEDIAL BRANCH, LUMBAR, 1 LEVEL Left 6/26/2018    [facility] by [facility] at [facility]    RADIOFREQUENCY " THERMOCOAGULATION (RFTC)-NERVE-MEDIAN BRANCH-LUMBAR Right 1/9/2018    [facility]    RADIOFREQUENCY THERMOCOAGULATION (RFTC)-NERVE-MEDIAN BRANCH-LUMBAR Left 12/26/2017    [facility]    RADIOFREQUENCY THERMOCOAGULATION (RFTC)-NERVE-MEDIAN BRANCH-LUMBAR Right 10/19/2016    [facility]    RADIOFREQUENCY THERMOCOAGULATION (RFTC)-NERVE-MEDIAN BRANCH-LUMBAR Left 10/5/2016    [facility]    RADIOFREQUENCY THERMOCOAGULATION (RFTC)-NERVE-MEDIAN BRANCH-LUMBAR Right 4/20/2016    [facility]    RADIOFREQUENCY THERMOCOAGULATION (RFTC)-NERVE-MEDIAN BRANCH-LUMBAR Left 4/6/2016    [facility]    RADIOFREQUENCY THERMOCOAGULATION (RFTC)-NERVE-MEDIAN BRANCH-LUMBAR/COOLED Right 5/9/2017    [facility]    RADIOFREQUENCY THERMOCOAGULATION (RFTC)-NERVE-MEDIAN BRANCH-LUMBAR/COOLED Left 4/26/2017    [facility]    RELEASE, TRIGGER FINGER Right 2/4/2013    [facility]formed by [facility] at [facility] 1ST FLR    RELEASE, TRIGGER FINGER, Left Thumb Left 7/29/2019    [facility] at [facility]    REPLACEMENT-KNEE-TOTAL Left 6/19/2014    [facility]formed by [facility] at [facility] 2ND FLR    Revision Carpal Tunnel-Right Right 12/14/2015    [facility]    REVISION-ARTHROPLASTY-KNEE-TOTAL Right 11/4/2014    [facility] at [facility] 2ND FLR    TRIGGER FINGER RELEASE Right 2017       Social History     Socioeconomic History    Marital status:      Spouse name: Not on file    Number of children: Not on file    Years of education: Not on file    Highest education level: Not on file   Occupational History    Not on file    Social Needs    Financial resource strain: Not on file    Food insecurity:     Worry: Not on file     Inability: Not on file    Transportation needs:     Medical: Not on file     Non-medical: Not on file   Tobacco Use    Smoking status: Never Smoker    Smokeless tobacco: Never Used   Substance and Sexual Activity    Alcohol use: Yes     Comment: occasionally     Drug use: No    Sexual activity: Yes     Partners: Female     Birth control/protection: None   Lifestyle    Physical activity:     Days per week: Not on file     Minutes per session: Not on file    Stress: Not on file   Relationships    Social connections:     Talks on phone: Not on file     Gets together: Not on file     Attends Hinduism service: Not on file     Active member of club or organization: Not on file     Attends meetings of clubs or organizations: Not on file     Relationship status: Not on file   Other Topics Concern    Not on file   Social History Narrative    Disabled. The patient is the youngest of 6 siblings. Single. Lives with single-sex partner.                    OBJECTIVE:     Vital Signs Range (Last 24H):         Significant Labs:  Lab Results   Component Value Date    WBC 6.63 08/12/2019    HGB 12.3 08/12/2019    HCT 38.1 08/12/2019     08/12/2019    CHOL 161 01/23/2019    TRIG 79 01/23/2019    HDL 51 01/23/2019    ALT 14 08/12/2019    AST 16 08/12/2019     08/12/2019    K 3.7 08/12/2019     08/12/2019    CREATININE 0.7 08/12/2019    BUN 12 08/12/2019    CO2 27 08/12/2019    TSH 1.979 01/23/2019    INR 1.4 12/01/2014    HGBA1C 6.9 (H) 08/12/2019       Diagnostic Studies: No relevant studies.    EKG:   Results for orders placed or performed in visit on 08/21/19   EKG 12-lead    Collection Time: 08/21/19 12:55 PM    Narrative    Test Reason : E11.9,    Vent. Rate : 075 BPM     Atrial Rate : 075 BPM     P-R Int : 140 ms          QRS Dur : 072 ms      QT Int : 378 ms       P-R-T Axes : 030 049 045  degrees     QTc Int : 422 ms    Normal sinus rhythm  Normal ECG  When compared with ECG of 06-MAY-2019 13:52,  Nonspecific T wave abnormality no longer evident in Lateral leads  Confirmed by Benito Welch MD ([id number]) on 8/26/2019 3:50:18 PM    Referred By: GUILLERMO GARCIA           Confirmed By:Benito Welch MD       2D ECHO:  TTE:  No results found. However, due to the size of the patient record, not all encounters were searched. Please check Results Review for a complete set of results.    VIVIAN:  No results found. However, due to the size of the patient record, not all encounters were searched. Please check Results Review for a complete set of results.    ASSESSMENT/PLAN:                                                                                                                  08/27/2019  Alivia Thomas is a 53 y.o., female.    Anesthesia Evaluation    I have reviewed the Patient Summary Reports.     I have reviewed the Medications.     Review of Systems  Anesthesia Hx:  No problems with previous Anesthesia  History of prior surgery of interest to airway management or planning: Denies Family Hx of Anesthesia complications.   Denies Personal Hx of Anesthesia complications.   Social:  Non-Smoker    Hematology/Oncology:     Oncology Normal     EENT/Dental:EENT/Dental Normal   Cardiovascular:   Exercise tolerance: poor Hypertension Stress 2/2019  · The relative PET images are normal showing no clinically significant regional resting or stress induced perfusion defects.  · Gated perfusion images showed an ejection fraction of 77 % at rest and 80 % during stress. Normal is >51%.  · Wall motion was normal at rest and stress.  · LV cavity size is normal at rest and normal at stress.  · The EKG portion of this study is negative for myocardial ischemia.  · Arrhythmias during stress: rare PACs.  · The patient reported headache during the stress test.      Pulmonary:   Asthma mild Sleep Apnea, CPAP    Hepatic/GI:   GERD,  well controlled    Musculoskeletal:   Arthritis   Spine Disorders: lumbar    Neurological:   Carpal tunnel   Endocrine:   Diabetes    Psych:   depression          Physical Exam  General:  Morbid Obesity    Airway/Jaw/Neck:  Airway Findings: Mouth Opening: Normal Tongue: Normal  General Airway Assessment: Adult  Improves to I with phonation.  TM Distance: Normal, at least 6 cm      Dental:  Dental Findings: In tact, Molar caps   Chest/Lungs:  Chest/Lungs Clear    Heart/Vascular:  Heart Findings: Rate: Normal  Rhythm: Regular Rhythm     Abdomen:  Abdomen Findings: Normal    Musculoskeletal:  Musculoskeletal Findings: Normal   Skin:  Skin Findings: Normal    Mental Status:  Mental Status Findings:  Cooperative, Alert and Oriented         Anesthesia Plan  Type of Anesthesia, risks & benefits discussed:  Anesthesia Type:  general, MAC  Patient's Preference:   Intra-op Monitoring Plan: standard ASA monitors  Intra-op Monitoring Plan Comments:   Post Op Pain Control Plan: per primary service following discharge from PACU, multimodal analgesia and IV/PO Opioids PRN  Post Op Pain Control Plan Comments:   Induction:   IV  Beta Blocker:  Patient is not currently on a Beta-Blocker (No further documentation required).       Informed Consent: Patient understands risks and agrees with Anesthesia plan.  Questions answered.   ASA Score: 3     Day of Surgery Review of History & Physical:    H&P update referred to the surgeon.     Anesthesia Plan Notes:           Ready For Surgery From Anesthesia Perspective.

## 2019-08-27 NOTE — TELEPHONE ENCOUNTER
Bette Nix, FLORY Tyler Staff             Good Morning,       I received a call from [facility] prior auth dept, the rep informed me that because [facility] is secondary to Medicare [employer], prior auth is not required.   I call [facility] benefits dept and per the rep cpt [id number] is covered under her policy and he confirmed that prior auth is not required due to [facility] being secondary to Medicare.     It is ok to proceed with the surgery.

## 2019-08-27 NOTE — TELEPHONE ENCOUNTER
Notified patient in clinic today while signing consents for the sleeve instead of the LRNY of arrival time to the [facility] at 1200 with expected surgery start time 1415 on 8/28/2019.   Instructed patient regarding pre-op instructions including npo status, showering and preop medications to hold/take per anesthesia/preop.  Instructed patient on the s/s of dehydration and for patient to call at the first sign of dehydration.  Informed patient that someone from bariatrics will be call them 1 week post op to review diet/fluid intake and to ensure adequate hydration.   Pt verbalized understanding. Pt given office phone number for any additional questions/concerns. Also reviewed no ABx or elective procedures involving sedation and or anesthesia for 30 days after surgery unless checking with us first.  She verbalized understanding.

## 2019-08-27 NOTE — TELEPHONE ENCOUNTER
Spoke with pt, explained that clinicals have been submitted to her secondary,[facility] and determination is still pending.  Her financial responsibility from her secondary would still be due at time of appointment today to sign consents.  She will apply from [employer] online, phone number to Neyda Chisholm,  given to verify details of refund if that is the case and approval of procedure is granted.  Patient will keep appointment at 2:30 today to sign consents for the Sleeve with Dr. Clements.  Patient verbalized understanding of above.  Message left for Neyda Chisholm to update on status of appointment today.

## 2019-08-28 ENCOUNTER — HOSPITAL ENCOUNTER (INPATIENT)
Facility: HOSPITAL | Age: 54
LOS: 2 days | Discharge: HOME | DRG: 621 | End: 2019-08-30
Attending: SURGERY | Admitting: SURGERY
Payer: MEDICARE

## 2019-08-28 ENCOUNTER — ANESTHESIA (OUTPATIENT)
Dept: SURGERY | Facility: HOSPITAL | Age: 54
DRG: 621 | End: 2019-08-28
Payer: MEDICARE

## 2019-08-28 ENCOUNTER — TELEPHONE (OUTPATIENT)
Dept: BARIATRICS | Facility: CLINIC | Age: 54
End: 2019-08-28

## 2019-08-28 DIAGNOSIS — E66.01 MORBID OBESITY: Primary | ICD-10-CM

## 2019-08-28 LAB
POCT GLUCOSE: 113 MG/DL (ref 70–110)
POCT GLUCOSE: 132 MG/DL (ref 70–110)

## 2019-08-28 PROCEDURE — S0028 INJECTION, FAMOTIDINE, 20 MG: HCPCS | Performed by: STUDENT IN AN ORGANIZED HEALTH CARE EDUCATION/TRAINING PROGRAM

## 2019-08-28 PROCEDURE — S0028 INJECTION, FAMOTIDINE, 20 MG: HCPCS | Performed by: SURGERY

## 2019-08-28 PROCEDURE — 88307 TISSUE EXAM BY PATHOLOGIST: CPT | Mod: 26,,, | Performed by: PATHOLOGY

## 2019-08-28 PROCEDURE — 63600175 PHARM REV CODE 636 W HCPCS: Performed by: STUDENT IN AN ORGANIZED HEALTH CARE EDUCATION/TRAINING PROGRAM

## 2019-08-28 PROCEDURE — 82962 GLUCOSE BLOOD TEST: CPT | Performed by: SURGERY

## 2019-08-28 PROCEDURE — 63600175 PHARM REV CODE 636 W HCPCS: Performed by: SURGERY

## 2019-08-28 PROCEDURE — 94761 N-INVAS EAR/PLS OXIMETRY MLT: CPT

## 2019-08-28 PROCEDURE — D9220A PRA ANESTHESIA: ICD-10-PCS | Mod: ,,, | Performed by: ANESTHESIOLOGY

## 2019-08-28 PROCEDURE — 63600175 PHARM REV CODE 636 W HCPCS

## 2019-08-28 PROCEDURE — 36000710: Performed by: SURGERY

## 2019-08-28 PROCEDURE — 71000033 HC RECOVERY, INTIAL HOUR: Performed by: SURGERY

## 2019-08-28 PROCEDURE — 25000003 PHARM REV CODE 250: Performed by: STUDENT IN AN ORGANIZED HEALTH CARE EDUCATION/TRAINING PROGRAM

## 2019-08-28 PROCEDURE — 37000008 HC ANESTHESIA 1ST 15 MINUTES: Performed by: SURGERY

## 2019-08-28 PROCEDURE — 43775 LAP SLEEVE GASTRECTOMY: CPT | Mod: ,,, | Performed by: SURGERY

## 2019-08-28 PROCEDURE — 71000039 HC RECOVERY, EACH ADD'L HOUR: Performed by: SURGERY

## 2019-08-28 PROCEDURE — 36000711: Performed by: SURGERY

## 2019-08-28 PROCEDURE — 43775 PR LAP, GAST RESTRICT PROC, LONGITUDINAL GASTRECTOMY: ICD-10-PCS | Mod: ,,, | Performed by: SURGERY

## 2019-08-28 PROCEDURE — D9220A PRA ANESTHESIA: Mod: ,,, | Performed by: ANESTHESIOLOGY

## 2019-08-28 PROCEDURE — 27201423 OPTIME MED/SURG SUP & DEVICES STERILE SUPPLY: Performed by: SURGERY

## 2019-08-28 PROCEDURE — 11000001 HC ACUTE MED/SURG PRIVATE ROOM

## 2019-08-28 PROCEDURE — 25000003 PHARM REV CODE 250: Performed by: SURGERY

## 2019-08-28 PROCEDURE — 88307 TISSUE SPECIMEN TO PATHOLOGY - SURGERY: ICD-10-PCS | Mod: 26,,, | Performed by: PATHOLOGY

## 2019-08-28 PROCEDURE — 37000009 HC ANESTHESIA EA ADD 15 MINS: Performed by: SURGERY

## 2019-08-28 PROCEDURE — 88307 TISSUE EXAM BY PATHOLOGIST: CPT | Performed by: PATHOLOGY

## 2019-08-28 PROCEDURE — 99900035 HC TECH TIME PER 15 MIN (STAT)

## 2019-08-28 PROCEDURE — 94770 HC EXHALED C02 TEST: CPT

## 2019-08-28 RX ORDER — ACETAMINOPHEN 10 MG/ML
1000 INJECTION, SOLUTION INTRAVENOUS ONCE
Status: COMPLETED | OUTPATIENT
Start: 2019-08-28 | End: 2019-08-28

## 2019-08-28 RX ORDER — KETAMINE HYDROCHLORIDE 10 MG/ML
INJECTION, SOLUTION INTRAMUSCULAR; INTRAVENOUS
Status: DISCONTINUED | OUTPATIENT
Start: 2019-08-28 | End: 2019-08-28

## 2019-08-28 RX ORDER — CEFAZOLIN SODIUM 1 G/3ML
INJECTION, POWDER, FOR SOLUTION INTRAMUSCULAR; INTRAVENOUS
Status: DISCONTINUED | OUTPATIENT
Start: 2019-08-28 | End: 2019-08-28

## 2019-08-28 RX ORDER — SODIUM CITRATE AND CITRIC ACID MONOHYDRATE 334; 500 MG/5ML; MG/5ML
15 SOLUTION ORAL ONCE
Status: COMPLETED | OUTPATIENT
Start: 2019-08-28 | End: 2019-08-28

## 2019-08-28 RX ORDER — NALOXONE HCL 0.4 MG/ML
0.02 VIAL (ML) INJECTION
Status: DISCONTINUED | OUTPATIENT
Start: 2019-08-28 | End: 2019-08-30 | Stop reason: HOSPADM

## 2019-08-28 RX ORDER — FAMOTIDINE 10 MG/ML
20 INJECTION INTRAVENOUS 2 TIMES DAILY
Status: DISCONTINUED | OUTPATIENT
Start: 2019-08-28 | End: 2019-08-29

## 2019-08-28 RX ORDER — LABETALOL HCL 20 MG/4 ML
10 SYRINGE (ML) INTRAVENOUS EVERY 6 HOURS PRN
Status: DISCONTINUED | OUTPATIENT
Start: 2019-08-28 | End: 2019-08-30 | Stop reason: HOSPADM

## 2019-08-28 RX ORDER — ROCURONIUM BROMIDE 10 MG/ML
INJECTION, SOLUTION INTRAVENOUS
Status: DISCONTINUED | OUTPATIENT
Start: 2019-08-28 | End: 2019-08-28

## 2019-08-28 RX ORDER — LIDOCAINE HCL/PF 100 MG/5ML
SYRINGE (ML) INTRAVENOUS
Status: DISCONTINUED | OUTPATIENT
Start: 2019-08-28 | End: 2019-08-28

## 2019-08-28 RX ORDER — HYDRALAZINE HYDROCHLORIDE 20 MG/ML
INJECTION INTRAMUSCULAR; INTRAVENOUS
Status: COMPLETED
Start: 2019-08-28 | End: 2019-08-28

## 2019-08-28 RX ORDER — DEXAMETHASONE SODIUM PHOSPHATE 4 MG/ML
INJECTION, SOLUTION INTRA-ARTICULAR; INTRALESIONAL; INTRAMUSCULAR; INTRAVENOUS; SOFT TISSUE
Status: DISCONTINUED | OUTPATIENT
Start: 2019-08-28 | End: 2019-08-28

## 2019-08-28 RX ORDER — HYDROMORPHONE HCL IN 0.9% NACL 6 MG/30 ML
PATIENT CONTROLLED ANALGESIA SYRINGE INTRAVENOUS CONTINUOUS
Status: DISCONTINUED | OUTPATIENT
Start: 2019-08-28 | End: 2019-08-29

## 2019-08-28 RX ORDER — SUCCINYLCHOLINE CHLORIDE 20 MG/ML
INJECTION INTRAMUSCULAR; INTRAVENOUS
Status: DISCONTINUED | OUTPATIENT
Start: 2019-08-28 | End: 2019-08-28

## 2019-08-28 RX ORDER — METOCLOPRAMIDE HYDROCHLORIDE 5 MG/ML
10 INJECTION INTRAMUSCULAR; INTRAVENOUS ONCE
Status: COMPLETED | OUTPATIENT
Start: 2019-08-28 | End: 2019-08-28

## 2019-08-28 RX ORDER — ONDANSETRON 2 MG/ML
4 INJECTION INTRAMUSCULAR; INTRAVENOUS EVERY 6 HOURS PRN
Status: DISCONTINUED | OUTPATIENT
Start: 2019-08-28 | End: 2019-08-29

## 2019-08-28 RX ORDER — SODIUM CHLORIDE 0.9 % (FLUSH) 0.9 %
10 SYRINGE (ML) INJECTION
Status: DISCONTINUED | OUTPATIENT
Start: 2019-08-28 | End: 2019-08-28 | Stop reason: HOSPADM

## 2019-08-28 RX ORDER — PHENYLEPHRINE HYDROCHLORIDE 10 MG/ML
INJECTION INTRAVENOUS
Status: DISCONTINUED | OUTPATIENT
Start: 2019-08-28 | End: 2019-08-28

## 2019-08-28 RX ORDER — SODIUM CHLORIDE 9 MG/ML
INJECTION, SOLUTION INTRAVENOUS CONTINUOUS PRN
Status: DISCONTINUED | OUTPATIENT
Start: 2019-08-28 | End: 2019-08-28

## 2019-08-28 RX ORDER — HYDROCODONE BITARTRATE AND ACETAMINOPHEN 7.5; 325 MG/15ML; MG/15ML
15 SOLUTION ORAL EVERY 4 HOURS PRN
Status: DISCONTINUED | OUTPATIENT
Start: 2019-08-29 | End: 2019-08-30 | Stop reason: HOSPADM

## 2019-08-28 RX ORDER — PROPOFOL 10 MG/ML
VIAL (ML) INTRAVENOUS
Status: DISCONTINUED | OUTPATIENT
Start: 2019-08-28 | End: 2019-08-28

## 2019-08-28 RX ORDER — INSULIN ASPART 100 [IU]/ML
0-5 INJECTION, SOLUTION INTRAVENOUS; SUBCUTANEOUS EVERY 6 HOURS PRN
Status: DISCONTINUED | OUTPATIENT
Start: 2019-08-28 | End: 2019-08-30 | Stop reason: HOSPADM

## 2019-08-28 RX ORDER — ACETAMINOPHEN AND CODEINE PHOSPHATE 300; 30 MG/1; MG/1
TABLET ORAL
Status: ON HOLD | COMMUNITY
End: 2019-08-30 | Stop reason: HOSPADM

## 2019-08-28 RX ORDER — FENTANYL CITRATE 50 UG/ML
INJECTION, SOLUTION INTRAMUSCULAR; INTRAVENOUS
Status: DISCONTINUED | OUTPATIENT
Start: 2019-08-28 | End: 2019-08-28

## 2019-08-28 RX ORDER — GLUCAGON 1 MG
1 KIT INJECTION
Status: DISCONTINUED | OUTPATIENT
Start: 2019-08-28 | End: 2019-08-30 | Stop reason: HOSPADM

## 2019-08-28 RX ORDER — HYDRALAZINE HYDROCHLORIDE 20 MG/ML
10 INJECTION INTRAMUSCULAR; INTRAVENOUS
Status: COMPLETED | OUTPATIENT
Start: 2019-08-28 | End: 2019-08-28

## 2019-08-28 RX ORDER — HEPARIN SODIUM 5000 [USP'U]/ML
5000 INJECTION, SOLUTION INTRAVENOUS; SUBCUTANEOUS ONCE
Status: COMPLETED | OUTPATIENT
Start: 2019-08-28 | End: 2019-08-28

## 2019-08-28 RX ORDER — ACETAMINOPHEN 10 MG/ML
INJECTION, SOLUTION INTRAVENOUS
Status: DISCONTINUED | OUTPATIENT
Start: 2019-08-28 | End: 2019-08-28

## 2019-08-28 RX ORDER — SODIUM CHLORIDE 9 MG/ML
INJECTION, SOLUTION INTRAVENOUS CONTINUOUS
Status: DISCONTINUED | OUTPATIENT
Start: 2019-08-28 | End: 2019-08-29

## 2019-08-28 RX ORDER — HYDROMORPHONE HYDROCHLORIDE 1 MG/ML
0.2 INJECTION, SOLUTION INTRAMUSCULAR; INTRAVENOUS; SUBCUTANEOUS EVERY 5 MIN PRN
Status: DISCONTINUED | OUTPATIENT
Start: 2019-08-28 | End: 2019-08-28 | Stop reason: HOSPADM

## 2019-08-28 RX ORDER — MIDAZOLAM HYDROCHLORIDE 1 MG/ML
INJECTION, SOLUTION INTRAMUSCULAR; INTRAVENOUS
Status: DISCONTINUED | OUTPATIENT
Start: 2019-08-28 | End: 2019-08-28

## 2019-08-28 RX ORDER — FAMOTIDINE 10 MG/ML
20 INJECTION INTRAVENOUS ONCE
Status: COMPLETED | OUTPATIENT
Start: 2019-08-28 | End: 2019-08-28

## 2019-08-28 RX ORDER — ENOXAPARIN SODIUM 100 MG/ML
40 INJECTION SUBCUTANEOUS EVERY 12 HOURS
Status: DISCONTINUED | OUTPATIENT
Start: 2019-08-28 | End: 2019-08-30 | Stop reason: HOSPADM

## 2019-08-28 RX ORDER — BUPIVACAINE HYDROCHLORIDE 2.5 MG/ML
INJECTION, SOLUTION EPIDURAL; INFILTRATION; INTRACAUDAL
Status: DISCONTINUED | OUTPATIENT
Start: 2019-08-28 | End: 2019-08-28

## 2019-08-28 RX ADMIN — FENTANYL CITRATE 25 MCG: 50 INJECTION, SOLUTION INTRAMUSCULAR; INTRAVENOUS at 01:08

## 2019-08-28 RX ADMIN — SUGAMMADEX 582 MG: 100 INJECTION, SOLUTION INTRAVENOUS at 01:08

## 2019-08-28 RX ADMIN — MIDAZOLAM HYDROCHLORIDE 2 MG: 1 INJECTION, SOLUTION INTRAMUSCULAR; INTRAVENOUS at 12:08

## 2019-08-28 RX ADMIN — ACETAMINOPHEN 1000 MG: 10 INJECTION, SOLUTION INTRAVENOUS at 09:08

## 2019-08-28 RX ADMIN — SUCCINYLCHOLINE CHLORIDE 140 MG: 20 INJECTION, SOLUTION INTRAMUSCULAR; INTRAVENOUS at 12:08

## 2019-08-28 RX ADMIN — ROCURONIUM BROMIDE 20 MG: 10 INJECTION, SOLUTION INTRAVENOUS at 01:08

## 2019-08-28 RX ADMIN — ENOXAPARIN SODIUM 40 MG: 100 INJECTION SUBCUTANEOUS at 09:08

## 2019-08-28 RX ADMIN — Medication: at 02:08

## 2019-08-28 RX ADMIN — HYDRALAZINE HYDROCHLORIDE 10 MG: 20 INJECTION INTRAMUSCULAR; INTRAVENOUS at 03:08

## 2019-08-28 RX ADMIN — PROPOFOL 200 MG: 10 INJECTION, EMULSION INTRAVENOUS at 12:08

## 2019-08-28 RX ADMIN — ROCURONIUM BROMIDE 5 MG: 10 INJECTION, SOLUTION INTRAVENOUS at 12:08

## 2019-08-28 RX ADMIN — SODIUM CHLORIDE: 0.9 INJECTION, SOLUTION INTRAVENOUS at 12:08

## 2019-08-28 RX ADMIN — ONDANSETRON 4 MG: 2 INJECTION INTRAMUSCULAR; INTRAVENOUS at 03:08

## 2019-08-28 RX ADMIN — ROCURONIUM BROMIDE 40 MG: 10 INJECTION, SOLUTION INTRAVENOUS at 12:08

## 2019-08-28 RX ADMIN — LIDOCAINE HYDROCHLORIDE 40 MG: 20 INJECTION, SOLUTION INTRAVENOUS at 12:08

## 2019-08-28 RX ADMIN — PHENYLEPHRINE HYDROCHLORIDE 100 MCG: 10 INJECTION INTRAVENOUS at 01:08

## 2019-08-28 RX ADMIN — METOCLOPRAMIDE 10 MG: 5 INJECTION, SOLUTION INTRAMUSCULAR; INTRAVENOUS at 11:08

## 2019-08-28 RX ADMIN — SODIUM CHLORIDE, SODIUM GLUCONATE, SODIUM ACETATE, POTASSIUM CHLORIDE, MAGNESIUM CHLORIDE, SODIUM PHOSPHATE, DIBASIC, AND POTASSIUM PHOSPHATE: .53; .5; .37; .037; .03; .012; .00082 INJECTION, SOLUTION INTRAVENOUS at 01:08

## 2019-08-28 RX ADMIN — HEPARIN SODIUM 5000 UNITS: 5000 INJECTION, SOLUTION INTRAVENOUS; SUBCUTANEOUS at 11:08

## 2019-08-28 RX ADMIN — FAMOTIDINE 20 MG: 10 INJECTION, SOLUTION INTRAVENOUS at 11:08

## 2019-08-28 RX ADMIN — ACETAMINOPHEN 1000 MG: 10 INJECTION, SOLUTION INTRAVENOUS at 12:08

## 2019-08-28 RX ADMIN — FENTANYL CITRATE 100 MCG: 50 INJECTION, SOLUTION INTRAMUSCULAR; INTRAVENOUS at 12:08

## 2019-08-28 RX ADMIN — CEFAZOLIN 3 G: 330 INJECTION, POWDER, FOR SOLUTION INTRAMUSCULAR; INTRAVENOUS at 12:08

## 2019-08-28 RX ADMIN — SODIUM CITRATE AND CITRIC ACID MONOHYDRATE 15 ML: 500; 334 SOLUTION ORAL at 11:08

## 2019-08-28 RX ADMIN — KETAMINE HYDROCHLORIDE 20 MG: 10 INJECTION, SOLUTION INTRAMUSCULAR; INTRAVENOUS at 12:08

## 2019-08-28 RX ADMIN — DEXAMETHASONE SODIUM PHOSPHATE 8 MG: 4 INJECTION, SOLUTION INTRAMUSCULAR; INTRAVENOUS at 12:08

## 2019-08-28 RX ADMIN — FAMOTIDINE 20 MG: 10 INJECTION, SOLUTION INTRAVENOUS at 09:08

## 2019-08-28 NOTE — INTERVAL H&P NOTE
The patient has been examined and the H&P has been reviewed:    I concur with the findings and no changes have occurred since H&P was written.   Past Medical History:   Diagnosis Date    Allergy     Anemia     Asthma     Bilateral shoulder bursitis     Cervical stenosis of spine     Chronic pain     DDD (degenerative disc disease), cervical     Depression 1/28/2019    Diabetes mellitus type II     DJD (degenerative joint disease) of knee 6/19/2014    Facet arthritis of lumbar region 12/17/2015    Facet syndrome 12/17/2015    GERD (gastroesophageal reflux disease)     Heartburn     Hyperlipidemia     Hypertension     Morbid obesity     Neuromuscular disorder     PADDY (obstructive sleep apnea)     Proteinuria     Right carpal tunnel syndrome     Sacroiliitis 6/13/2018    Sacroiliitis     Sleep apnea        Past Surgical History:   Procedure Laterality Date    ABLATION-RADIOFREQUENCY Left 6/18/2019    [facility]    BLOCK, NERVE, INTERCOSTAL, SINGLE Left 4/2/2014    [facility]    BLOCK-NERVE-MEDIAL BRANCH-LUMBAR Bilateral 3/1/2016    [facility]    BLOCK-NERVE-MEDIAL BRANCH-LUMBAR Bilateral 2/16/2016    [facility]    CARPAL TUNNEL RELEASE      CARPAL TUNNEL RELEASE  1980s    left    CARPAL TUNNEL RELEASE  2012    right    EGD (ESOPHAGOGASTRODUODENOSCOPY) N/A 3/27/2019    [facility] (2ND FLR)    ESOPHAGOGASTRODUODENOSCOPY (EGD) N/A 12/19/2016    Per[facility] by [facility] at [facility] (2ND FLR)    JOINT REPLACEMENT Bilateral     with 2 revisions on rt    KNEE SURGERY  3/2010    orthroscope    KNEE SURGERY  6-19-14    left TKR    RADIOFREQUENCY ABLATION Right 7/9/2019    [facility]    Radiofrequency Ablation LEFT L2,3,4,5 RFA Left 12/27/2018     [facility]    Radiofrequency Ablation RIGHT LUMBAR L2,3,4,5 RFA Right 1/29/2019    [facility]    RADIOFREQUENCY ABLATION, NERVE, MEDIAL BRANCH, LUMBAR, 1 LEVEL Right 7/10/2018    [facility]    RADIOFREQUENCY ABLATION, NERVE, MEDIAL BRANCH, LUMBAR, 1 LEVEL Left 6/26/2018    [facility]    RADIOFREQUENCY THERMOCOAGULATION (RFTC)-NERVE-MEDIAN BRANCH-LUMBAR Right 1/9/2018    [facility]    RADIOFREQUENCY THERMOCOAGULATION (RFTC)-NERVE-MEDIAN BRANCH-LUMBAR Left 12/26/2017    [facility]    RADIOFREQUENCY THERMOCOAGULATION (RFTC)-NERVE-MEDIAN BRANCH-LUMBAR Right 10/19/2016    [facility]    RADIOFREQUENCY THERMOCOAGULATION (RFTC)-NERVE-MEDIAN BRANCH-LUMBAR Left 10/5/2016    [facility]    RADIOFREQUENCY THERMOCOAGULATION (RFTC)-NERVE-MEDIAN BRANCH-LUMBAR Right 4/20/2016    [facility]    RADIOFREQUENCY THERMOCOAGULATION (RFTC)-NERVE-MEDIAN BRANCH-LUMBAR Left 4/6/2016    [facility]    RADIOFREQUENCY THERMOCOAGULATION (RFTC)-NERVE-MEDIAN BRANCH-LUMBAR/COOLED Right 5/9/2017    [facility]    RADIOFREQUENCY THERMOCOAGULATION (RFTC)-NERVE-MEDIAN BRANCH-LUMBAR/COOLED Left 4/26/2017    [facility]    RELEASE, TRIGGER FINGER Right 2/4/2013    [facility]    RELEASE, TRIGGER FINGER, Left Thumb Left 7/29/2019    [facility]    REPLACEMENT-KNEE-TOTAL Left 6/19/2014    [facility] 2ND FLR    Revision Carpal Tunnel-Right Right  12/14/2015    [facility]    REVISION-ARTHROPLASTY-KNEE-TOTAL Right 11/4/2014    [facility] 2ND FLR    TRIGGER FINGER RELEASE Right 2017       Family History   Problem Relation Age of Onset    Diabetes Mother     Cataracts Mother     Diabetes Father     Cataracts Father     Coronary artery disease Brother     Diabetes Brother     Hypertension Sister     Diabetes Sister     No Known Problems Sister     Cancer Sister         lymphoma     Diabetes Brother     Hypotension Brother     Kidney failure Brother     Hypotension Brother     Amblyopia Neg Hx     Blindness Neg Hx     Glaucoma Neg Hx     Macular degeneration Neg Hx     Retinal detachment Neg Hx     Strabismus Neg Hx     Stroke Neg Hx     Thyroid disease Neg Hx        Social History     Socioeconomic History    Marital status:      Spouse name: Not on file    Number of children: Not on file    Years of education: Not on file    Highest education level: Not on file   Occupational History    Not on file   Social Needs    Financial resource strain: Not on file    Food insecurity:     Worry: Not on file     Inability: Not on file    Transportation needs:     Medical: Not on file     Non-medical: Not on file   Tobacco Use    Smoking status: Never Smoker    Smokeless tobacco: Never Used   Substance and Sexual Activity    Alcohol use: Yes     Comment: occasionally     Drug use: No    Sexual activity: Yes     Partners: Female     Birth control/protection: None   Lifestyle    Physical activity:     Days per week: Not on file     Minutes per session: Not on file    Stress: Not on file   Relationships    Social connections:     Talks on phone: Not on file     Gets together: Not on file     Attends Congregation service: Not on file     Active member of club or organization: Not on file     Attends meetings of clubs or organizations: Not on file     Relationship  status: Not on file   Other Topics Concern    Not on file   Social History Narrative    Disabled. The patient is the youngest of 6 siblings. Single. Lives with single-sex partner.                    Current Facility-Administered Medications   Medication Dose Route Frequency Provider Last Rate Last Dose    ceFAZolin (ANCEF) 3 gram in dextrose 5% IVPB  3 g Intravenous On Call Procedure Heriberto Clements MD        famotidine (PF) injection 20 mg  20 mg Intravenous Once Heriberto Clements MD        heparin (porcine) injection 5,000 Units  5,000 Units Subcutaneous Once Heriberto Clements MD        metoclopramide HCl injection 10 mg  10 mg Intravenous Once Heriberto Clements MD        sodium citrate-citric acid 500-334 mg/5 ml solution 15 mL  15 mL Oral Once Heriberto Clements MD           Review of patient's allergies indicates:   Allergen Reactions    Strawberry Anaphylaxis           Anesthesia/Surgery risks, benefits and alternative options discussed and understood by patient/family.          Active Hospital Problems    Diagnosis  POA    Morbid obesity [E66.01]  Yes      Resolved Hospital Problems   No resolved problems to display.

## 2019-08-28 NOTE — TRANSFER OF CARE
"Anesthesia Transfer of Care Note    Patient: Alivia Thomas    Procedure(s) Performed: Procedure(s) (LRB):  GASTRECTOMY, SLEEVE, LAPAROSCOPIC with intraop EGD (N/A)    Patient location: PACU    Anesthesia Type: general    Transport from OR: Transported from OR on 6-10 L/min O2 by face mask with adequate spontaneous ventilation    Post pain: adequate analgesia    Post assessment: no apparent anesthetic complications    Post vital signs: stable    Level of consciousness: responds to stimulation    Nausea/Vomiting: no nausea/vomiting    Complications: none    Transfer of care protocol was followed      Last vitals:   Visit Vitals  /73 (BP Location: Left arm, Patient Position: Lying)   Pulse 89   Temp 36.6 °C (97.9 °F) (Oral)   Resp (!) 22   Ht 5' 5" (1.651 m)   Wt (!) 145.5 kg (320 lb 12.3 oz)   LMP 06/26/2018   SpO2 98%   Breastfeeding? No   BMI 53.38 kg/m²     "

## 2019-08-28 NOTE — BRIEF OP NOTE
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]  Uncontrolled type 2 diabetes mellitus with hyperglycemia [E11.65]  PADDY on CPAP [G47.33, Z99.89]  Nutritional counseling [Z71.3]  Hyperlipidemia, unspecified hyperlipidemia type [E78.5]  Primary osteoarthritis of both knees [M17.0]  Morbid obesity [E66.01]    Post-op Diagnosis:  BMI 50.0-59.9, adult [Z68.43]  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  Sleeve without apparent complication.    Estimated Blood Loss: Minimal           Specimens (From admission, onward)    Start     Ordered    08/28/19 1341  Specimen to Pathology - Surgery  Once     Start Status     08/28/19 1341 Collected (08/28/19 1343) Order ID: [id number]       08/28/19 1342        Implants: * No implants in log *           Disposition: PACU - hemodynamically stable.           Condition: Good    Attestation:  I was present and scrubbed for the entire procedure.

## 2019-08-28 NOTE — PLAN OF CARE
Problem: Adult Inpatient Plan of Care  Goal: Plan of Care Review  Outcome: Ongoing (interventions implemented as appropriate)  Patient arrived to the floor at this time. Complaining of nausea cared with cool towel and PRN medication. ETCO2 monitor on. PCA for pain management in place. IS teaching complete. SCDs in place. Safety precautions implemented. Patient educated on strict NPO. Will continue to monitor closely.

## 2019-08-28 NOTE — CARE UPDATE
Rapid Response Respiratory Therapy Proactive Rounding Note      Time of visit: 1625    Code Status: Prior   : 1965  Age: 53 y.o.  Weight:   Wt Readings from Last 1 Encounters:   19 (!) 145.5 kg (320 lb 12.3 oz)     Sex: female  Race: Black or    Bed: 6/6 A:   MRN: [id number]    SITUATION     Evaluated patient for: EtCO2 check.    BACKGROUND     Patient has a past medical history of Allergy, Anemia, Asthma, Bilateral shoulder bursitis, Cervical stenosis of spine, Chronic pain, DDD (degenerative disc disease), cervical, Depression, Diabetes mellitus type II, DJD (degenerative joint disease) of knee, Facet arthritis of lumbar region, Facet syndrome, GERD (gastroesophageal reflux disease), Heartburn, Hyperlipidemia, Hypertension, Morbid obesity, Neuromuscular disorder, PADDY (obstructive sleep apnea), Proteinuria, Right carpal tunnel syndrome, Sacroiliitis, Sacroiliitis, and Sleep apnea.  Pulmonary Hx: COPD PE PADDY  Clinically Significant Surgical Hx: None    ASSESSMENT     Pulse: Pulse: 105 Respiratory rate: Resp: (!) 22 Temperature: Temp: 97.6 °F (36.4 °C) BP: BP: 124/64 SpO2:SpO2: (!) 94 %   Level of Consciousness: Level of Consciousness (AVPU): alert  Respiratory Effort: Respiratory Effort: Normal, Unlabored  Expansion/Accessory Muscle Usage: Expansion/Accessory Muscles/Retractions: no use of accessory muscles, no retractions, expansion symmetric  All Lung Field Breath Sounds: All Lung Fields Breath Sounds: clear, equal bilaterally  Cough Type:    Mobility at time of assessment: General Mobility: no overt deficits noted  O2 Device/Concentration: O2 Device (Oxygen Therapy): room air   Flow (L/min): 2    Most recent blood gas: No results for input(s): PH, PCO2, PO2, HCO3, POCSATURATED, BE in the last 72 hours.  PF Ratio Calculator  P/F Ratio:    Current Respiratory Care Orders:   19 1600  Pulse Oximetry Q4H Every 4 hours (3 of 20 released)    Release    19 1671    08/28/19 1409  Oxygen Continuous Continuous     Comments: Discontinue when Sp02 is greater than or equal to 95% of equal to Preop Sp02   References: Oxygen Titration Protocol   Question Answer Comment   Device type: Low flow    Device: Simple Face Mask    Titrate O2 per Oxygen Titration Protocol: Yes    Notify MD of: Inability to achieve desired SpO2               NIPPV: No  Surgical airway: No  ETCO2 monitored: ETCO2 (mmHg): 36 mmHg  Ambu at bedside: Ambu bag with the patient?: Yes, Adult Ambu    INTERVENTIONS/RECOMMENDATIONS   ?  EtCO2 check. Patient wearing and compliant. EtCO2 36, RR 24. Patient on RA. Will continue to monitor.    FOLLOW-UP     Please call back the Rapid Response RT, Pedro Luis Jaimes, RRT at x [phone] for any questions or concerns.

## 2019-08-28 NOTE — OP NOTE
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   INDICATIONS: A 53 y.o. with 1. Morbid Obesity with Body mass index is 61.3 kg/m². and inability to lose any significant weight but has lost 22 pounds since January.   2. Co-morbidities: essential HLD, DM type 2 which was out of control in the past, on long term insulin, and no complication, PADDY on cpap but doesn;t use, Asthma, osteoarthritis , improving gerd  3. Iron deficiency, vit d deficiency under treatment  DESCRIPTION OF PROCEDURE: The patient was placed under general anesthesia. The   abdomen was prepped and draped in usual manner. Access to peritoneum was   gained through the umbilicus using Optiview trocar under direct vision.   Pneumoperitoneum to 15 mmHg with CO2 gas was obtained. Four 5 mm trocars were   placed medially, subcostally at the midclavicular and anterior axial lines   bilaterally. A 10 mm trocar was placed 1 handbreadth caudad to the right   midclavicular trocar. The liver retractor  was placed. The greater curve was   taken down starting 6 cm from the pylorus going all the way to the base of the   left boy taking all posterior gastric attachments with the Harmonic scalpel. A   42-Faroese bougie was passed towards the pylorus and the stomach was resected  along the bougie starting 6 cm from the pylorus and coming out just a   little bit at the angle of His. The staple line was oversewn with a running   Quill stitch. The bougie was removed. Endoscopy was   performed. The sleeve appeared appropriate size and configuration and there   were no air leaks seen. The air was aspirated from the endoscope and the   endoscope was withdrawn. Gilberto was placed over the surgical areas.  Pneumoperitoneum was released for one minute and re-inflated to check for areas of bleeding which were controlled. The liver retractor was removed. The gastrectomy was   removed through the primary trochar site. That incision was then closed with 0 Vicryl. The trocars removed under direct vision. Prior to   removing the last trocar, the pneumoperitoneum was allowed to escape. The skin   incisions were infiltrated with Marcaine solution, closed with 4-0 plain catgut,   and reinforced with Mastisol, Steri-Strips, and Band-Aids. The patient   tolerated procedure well and was brought to Recovery Room in stable condition.   Sponge and needle counts were correct at the end of the case.    Blood loss was min, complications are none, consent was obtained and pathology was gastrectomy.

## 2019-08-28 NOTE — NURSING TRANSFER
Nursing Transfer Note      8/28/2019     Transfer To: 556a    Transfer via stretcher    Transfer with 2L NC to O2    Transported by pct    Medicines sent: IV fluids and PCA    Chart send with patient: Yes    Notified: spouse    Patient reassessed at: 8/28/19 see flowsheets

## 2019-08-28 NOTE — TELEPHONE ENCOUNTER
Phoned pateint to explain to her that they moved up her surgery case and can she come in now for her surgery at 1215.  She verbalized that she would be on her way right away.

## 2019-08-28 NOTE — ANESTHESIA POSTPROCEDURE EVALUATION
Anesthesia Post Evaluation    Patient: Alivia Thomas    Procedure(s) Performed: Procedure(s) (LRB):  GASTRECTOMY, SLEEVE, LAPAROSCOPIC with intraop EGD (N/A)    Final Anesthesia Type: general  Patient location during evaluation: PACU  Patient participation: Yes- Able to Participate  Level of consciousness: awake and alert and oriented  Post-procedure vital signs: reviewed and stable  Pain management: adequate  Airway patency: patent  PONV status at discharge: No PONV  Anesthetic complications: no      Cardiovascular status: hemodynamically stable  Respiratory status: unassisted  Hydration status: euvolemic  Follow-up not needed.          Vitals Value Taken Time   /61 8/28/2019  3:47 PM   Temp 36.8 °C (98.2 °F) 8/28/2019  3:30 PM   Pulse 98 8/28/2019  3:56 PM   Resp 23 8/28/2019  3:56 PM   SpO2 98 % 8/28/2019  4:00 PM   Vitals shown include unvalidated device data.      Event Time     Out of Recovery 16:16:00          Pain/Aracely Score: Pain Rating Prior to Med Admin: 5 (8/28/2019  2:20 PM)  Aracely Score: 9 (8/28/2019  3:00 PM)

## 2019-08-29 LAB
ANION GAP SERPL CALC-SCNC: 13 MMOL/L (ref 8–16)
BASOPHILS # BLD AUTO: 0.02 K/UL (ref 0–0.2)
BASOPHILS NFR BLD: 0.2 % (ref 0–1.9)
BUN SERPL-MCNC: 9 MG/DL (ref 6–20)
CALCIUM SERPL-MCNC: 9 MG/DL (ref 8.7–10.5)
CHLORIDE SERPL-SCNC: 107 MMOL/L (ref 95–110)
CO2 SERPL-SCNC: 20 MMOL/L (ref 23–29)
CREAT SERPL-MCNC: 0.7 MG/DL (ref 0.5–1.4)
DIFFERENTIAL METHOD: ABNORMAL
EOSINOPHIL # BLD AUTO: 0 K/UL (ref 0–0.5)
EOSINOPHIL NFR BLD: 0 % (ref 0–8)
ERYTHROCYTE [DISTWIDTH] IN BLOOD BY AUTOMATED COUNT: 14.8 % (ref 11.5–14.5)
EST. GFR  (AFRICAN AMERICAN): >60 ML/MIN/1.73 M^2
EST. GFR  (NON AFRICAN AMERICAN): >60 ML/MIN/1.73 M^2
GLUCOSE SERPL-MCNC: 127 MG/DL (ref 70–110)
HCT VFR BLD AUTO: 37.7 % (ref 37–48.5)
HGB BLD-MCNC: 12.1 G/DL (ref 12–16)
IMM GRANULOCYTES # BLD AUTO: 0.04 K/UL (ref 0–0.04)
IMM GRANULOCYTES NFR BLD AUTO: 0.4 % (ref 0–0.5)
LYMPHOCYTES # BLD AUTO: 1.1 K/UL (ref 1–4.8)
LYMPHOCYTES NFR BLD: 9.7 % (ref 18–48)
MAGNESIUM SERPL-MCNC: 1.8 MG/DL (ref 1.6–2.6)
MCH RBC QN AUTO: 29.8 PG (ref 27–31)
MCHC RBC AUTO-ENTMCNC: 32.1 G/DL (ref 32–36)
MCV RBC AUTO: 93 FL (ref 82–98)
MONOCYTES # BLD AUTO: 0.7 K/UL (ref 0.3–1)
MONOCYTES NFR BLD: 6.4 % (ref 4–15)
NEUTROPHILS # BLD AUTO: 9.5 K/UL (ref 1.8–7.7)
NEUTROPHILS NFR BLD: 83.3 % (ref 38–73)
NRBC BLD-RTO: 0 /100 WBC
PHOSPHATE SERPL-MCNC: 3.5 MG/DL (ref 2.7–4.5)
PLATELET # BLD AUTO: 285 K/UL (ref 150–350)
PMV BLD AUTO: 12 FL (ref 9.2–12.9)
POCT GLUCOSE: 123 MG/DL (ref 70–110)
POCT GLUCOSE: 125 MG/DL (ref 70–110)
POCT GLUCOSE: 149 MG/DL (ref 70–110)
POCT GLUCOSE: 159 MG/DL (ref 70–110)
POTASSIUM SERPL-SCNC: 4.4 MMOL/L (ref 3.5–5.1)
RBC # BLD AUTO: 4.06 M/UL (ref 4–5.4)
SODIUM SERPL-SCNC: 140 MMOL/L (ref 136–145)
WBC # BLD AUTO: 11.36 K/UL (ref 3.9–12.7)

## 2019-08-29 PROCEDURE — S0028 INJECTION, FAMOTIDINE, 20 MG: HCPCS | Performed by: STUDENT IN AN ORGANIZED HEALTH CARE EDUCATION/TRAINING PROGRAM

## 2019-08-29 PROCEDURE — 36415 COLL VENOUS BLD VENIPUNCTURE: CPT

## 2019-08-29 PROCEDURE — 25000003 PHARM REV CODE 250: Performed by: STUDENT IN AN ORGANIZED HEALTH CARE EDUCATION/TRAINING PROGRAM

## 2019-08-29 PROCEDURE — 99900035 HC TECH TIME PER 15 MIN (STAT)

## 2019-08-29 PROCEDURE — 63600175 PHARM REV CODE 636 W HCPCS: Performed by: STUDENT IN AN ORGANIZED HEALTH CARE EDUCATION/TRAINING PROGRAM

## 2019-08-29 PROCEDURE — 11000001 HC ACUTE MED/SURG PRIVATE ROOM

## 2019-08-29 PROCEDURE — 94760 N-INVAS EAR/PLS OXIMETRY 1: CPT

## 2019-08-29 PROCEDURE — 83735 ASSAY OF MAGNESIUM: CPT

## 2019-08-29 PROCEDURE — 84100 ASSAY OF PHOSPHORUS: CPT

## 2019-08-29 PROCEDURE — 85025 COMPLETE CBC W/AUTO DIFF WBC: CPT

## 2019-08-29 PROCEDURE — 80048 BASIC METABOLIC PNL TOTAL CA: CPT

## 2019-08-29 RX ORDER — ONDANSETRON 4 MG/1
4 TABLET, FILM COATED ORAL EVERY 6 HOURS PRN
Status: DISCONTINUED | OUTPATIENT
Start: 2019-08-29 | End: 2019-08-30 | Stop reason: HOSPADM

## 2019-08-29 RX ORDER — FAMOTIDINE 20 MG/1
20 TABLET, FILM COATED ORAL 2 TIMES DAILY
Status: DISCONTINUED | OUTPATIENT
Start: 2019-08-29 | End: 2019-08-30 | Stop reason: HOSPADM

## 2019-08-29 RX ORDER — ONDANSETRON 4 MG/1
4 TABLET, FILM COATED ORAL ONCE
Status: DISCONTINUED | OUTPATIENT
Start: 2019-08-29 | End: 2019-08-29

## 2019-08-29 RX ORDER — PROMETHAZINE HYDROCHLORIDE 6.25 MG/5ML
12.5 SYRUP ORAL EVERY 6 HOURS PRN
Status: DISCONTINUED | OUTPATIENT
Start: 2019-08-29 | End: 2019-08-30 | Stop reason: HOSPADM

## 2019-08-29 RX ADMIN — HYDROCODONE BITARTRATE AND ACETAMINOPHEN 15 ML: 7.5; 325 SOLUTION ORAL at 11:08

## 2019-08-29 RX ADMIN — FAMOTIDINE 20 MG: 10 INJECTION, SOLUTION INTRAVENOUS at 08:08

## 2019-08-29 RX ADMIN — ENOXAPARIN SODIUM 40 MG: 100 INJECTION SUBCUTANEOUS at 08:08

## 2019-08-29 RX ADMIN — ONDANSETRON 4 MG: 2 INJECTION INTRAMUSCULAR; INTRAVENOUS at 03:08

## 2019-08-29 RX ADMIN — FAMOTIDINE 20 MG: 20 TABLET, FILM COATED ORAL at 08:08

## 2019-08-29 RX ADMIN — HYDROCODONE BITARTRATE AND ACETAMINOPHEN 15 ML: 7.5; 325 SOLUTION ORAL at 08:08

## 2019-08-29 RX ADMIN — PROMETHAZINE HYDROCHLORIDE 12.5 MG: 6.25 SOLUTION ORAL at 05:08

## 2019-08-29 RX ADMIN — PROMETHAZINE HYDROCHLORIDE 6.25 MG: 25 INJECTION INTRAMUSCULAR; INTRAVENOUS at 08:08

## 2019-08-29 RX ADMIN — PROMETHAZINE HYDROCHLORIDE 12.5 MG: 6.25 SOLUTION ORAL at 11:08

## 2019-08-29 NOTE — CARE UPDATE
Rapid Response Respiratory Therapy Proactive Rounding Note      Time of visit: 1430    Code Status: Full Code   : 1965  Age: 53 y.o.  Weight:   Wt Readings from Last 1 Encounters:   19 (!) 145.5 kg (320 lb 12.3 oz)     Sex: female  Race: Black or    [facility]: [facility] A:   MRN: [id number]    SITUATION     Evaluated patient for: PCA pump and ETCO2 monitoring check    BACKGROUND     Patient has a past medical history of Allergy, Anemia, Asthma, Bilateral shoulder bursitis, Cervical stenosis of spine, Chronic pain, DDD (degenerative disc disease), cervical, Depression, Diabetes mellitus type II, DJD (degenerative joint disease) of knee, Facet arthritis of lumbar region, Facet syndrome, GERD (gastroesophageal reflux disease), Heartburn, Hyperlipidemia, Hypertension, Morbid obesity, Neuromuscular disorder, PADDY (obstructive sleep apnea), Proteinuria, Right carpal tunnel syndrome, Sacroiliitis, Sacroiliitis, and Sleep apnea.  Pulmonary Hx: PADDY  Clinically Significant Surgical Hx: None    ASSESSMENT     Pulse: Pulse: 80 Respiratory rate: Resp: 16 Temperature: Temp: 97.1 °F (36.2 °C) BP: BP: (!) 171/74 SpO2:SpO2: (!) 94 %   Level of Consciousness: Level of Consciousness (AVPU): alert  Respiratory Effort: Respiratory Effort: Normal, Unlabored  Expansion/Accessory Muscle Usage: Expansion/Accessory Muscles/Retractions: expansion symmetric, no retractions, no use of accessory muscles  All Lung Field Breath Sounds: All Lung Fields Breath Sounds: clear  Cough Type:    Mobility at time of assessment: General Mobility: no overt deficits noted  O2 Device/Concentration: O2 Device (Oxygen Therapy): room air   Flow (L/min): 2    Most recent blood gas: No results for input(s): PH, PCO2, PO2, HCO3, POCSATURATED, BE in the last 72 hours.   Current Respiratory Care Orders: pulse ox Q4  NIPPV: No  Surgical airway: No  ETCO2 monitored: ETCO2 (mmHg): 36 mmHg  Ambu at bedside: Ambu bag with the patient?: Yes, Adult  Ambu    INTERVENTIONS/RECOMMENDATIONS   ?  No respiratory concerns at this time. Pt is no longer on PCA pump. ETCO2 monitoring not necessary at this time    Discussed plan of care with and primary RT, SHARONDA Canales.     FOLLOW-UP     Please call back the Rapid Response RT, Verena Salgado, CRT at x [phone] for any questions or concerns.

## 2019-08-29 NOTE — PLAN OF CARE
08/29/19 1025   Post-Acute Status   Post-Acute Authorization Other   Other Status No Post-Acute Service Needs   This SW in communication with  and medical team. SW will continue to follow for discharge needs and offer support as needed.    No SW needs identified at this time.    Ibeth De Leon LMSW  [facility]  [id number]

## 2019-08-29 NOTE — PROGRESS NOTES
[facility]  General Surgery  Progress Note    Subjective:     Post-Op Info:  Procedure(s) (LRB):  GASTRECTOMY, SLEEVE, LAPAROSCOPIC with intraop EGD (N/A)   1 Day Post-Op     Interval History: s/p sleeve gastrectomy on 8/28   Patient was tachycardic overnight to 118 at approximately 0300. She was hypertensive to  post operatively, which was resolved with PRN medications. Otherwise vitals stable.   NPO with intermittent nausea controlled with prn medications. No emesis.   Ambulated to the bathroom. Voiding well.     Medications:  Continuous Infusions:   sodium chloride 0.9% 125 mL/hr at 08/28/19 1515    hydromorphone in 0.9 % NaCl 6 mg/30 ml       Scheduled Meds:   enoxparin  40 mg Subcutaneous Q12H    famotidine (PF)  20 mg Intravenous BID     PRN Meds:Dextrose 10% Bolus, glucagon (human recombinant), hydrocodone-apap 7.5-325 MG/15 ML, insulin aspart U-100, labetalol, naloxone, ondansetron, promethazine (PHENERGAN) IVPB     Review of patient's allergies indicates:   Allergen Reactions    Strawberry Anaphylaxis     Objective:     Vital Signs (Most Recent):  Temp: 97.1 °F (36.2 °C) (08/29/19 0311)  Pulse: (!) 118 (08/29/19 0311)  Resp: 18 (08/29/19 0311)  BP: 138/71 (08/29/19 0311)  SpO2: (!) 94 % (08/29/19 0311) Vital Signs (24h Range):  Temp:  [97.1 °F (36.2 °C)-98.5 °F (36.9 °C)] 97.1 °F (36.2 °C)  Pulse:  [] 118  Resp:  [14-25] 18  SpO2:  [92 %-100 %] 94 %  BP: (124-203)/(63-97) 138/71     Weight: (!) 145.5 kg (320 lb 12.3 oz)  Body mass index is 53.38 kg/m².    Intake/Output - Last 3 Shifts       08/27 0700 - 08/28 0659 08/28 0700 - 08/29 0659 08/29 0700 - 08/30 0659    P.O.  0     I.V. (mL/kg)  1343.8 (9.2)     Total Intake(mL/kg)  1343.8 (9.2)     Urine (mL/kg/hr)  500     Total Output  500     Net  +843.8            Urine Occurrence  0 x           Physical Exam   Constitutional: She is oriented to person, place, and time. She appears well-developed and well-nourished. No  distress.   Well appearing. Sitting up in bed    Cardiovascular: Normal rate, regular rhythm and intact distal pulses.   Pulmonary/Chest: Effort normal. No respiratory distress.   Abdominal: Soft. She exhibits no distension.   Lap incisions c/d/i. Dermabond in place.   Musculoskeletal: Normal range of motion. She exhibits no edema.   Neurological: She is alert and oriented to person, place, and time.   Skin: Skin is warm. She is not diaphoretic.       Significant Labs:  CBC:   Recent Labs   Lab 08/29/19 0316   WBC 11.36   RBC 4.06   HGB 12.1   HCT 37.7      MCV 93   MCH 29.8   MCHC 32.1     BMP:   Recent Labs   Lab 08/29/19 0316   *      K 4.4      CO2 20*   BUN 9   CREATININE 0.7   CALCIUM 9.0   MG 1.8     CMP:   Recent Labs   Lab 08/29/19 0316   *   CALCIUM 9.0      K 4.4   CO2 20*      BUN 9   CREATININE 0.7       Significant Diagnostics:  No recent imaging    Assessment/Plan:     * Morbid obesity  54 yo female s/p sleeve gastrectomy on 8/28    -NPO, water protocol initiated. If tolerates, will advance to bariatric clear diet   -mIVF   -PCA for pain   -PRN antiemetics   -Place on telemetry  -Labetaolol PRN SBP  -Lovenox 40mg BID and SCDs(DVT ppx)  -Famotidine (GI ppx)  -Encourage ambulation and out of bed to chair   -Encourage IS  -Plan for possible discharge today pending pain control and toleration of diet        Rox Ruby MD  General Surgery  [facility]

## 2019-08-29 NOTE — ASSESSMENT & PLAN NOTE
54 yo female s/p sleeve gastrectomy on 8/28    -NPO, water protocol initiated. If tolerates, will advance to bariatric clear diet   -mIVF   -PCA for pain   -PRN antiemetics   -Labetaolol PRN SBP  -Lovenox 40mg BID and SCDs(DVT ppx)  -Famotidine (GI ppx)  -Encourage ambulation and out of bed to chair   -Encourage IS  -Plan for possible discharge today pending pain control and toleration of diet

## 2019-08-29 NOTE — PLAN OF CARE
Patient lives in a split level house, w/3 steps inside house to upper level, w/her partner/spouse. Patient is independent & agile. No needs are determined.    [facility] given to patient after informed about it;patient verbalized their understanding.        08/29/19 1130   Discharge Assessment   Assessment Type Discharge Planning Assessment   Confirmed/corrected address and phone number on facesheet? Yes   Assessment information obtained from? Patient;Medical Record   Expected Length of Stay (days)   (1-2)   Communicated expected length of stay with patient/caregiver no  (Per MD)   Prior to hospitilization cognitive status: Alert/Oriented;No Deficits   Prior to hospitalization functional status: Independent;Assistive Equipment   Current cognitive status: Alert/Oriented;No Deficits   Current Functional Status: Independent;Needs Assistance   Facility Arrived From:   (N/A)   Lives With spouse   Able to Return to Prior Arrangements yes   Is patient able to care for self after discharge? Yes   Who are your caregiver(s) and their phone number(s)?   (Ba Sanders Spouse     [phone] partner )   Patient's perception of discharge disposition home or selfcare   Readmission Within the Last 30 Days no previous admission in last 30 days   Patient currently being followed by outpatient case management? No   Patient currently receives any other outside agency services? No   Equipment Currently Used at Home CPAP  (Not using CPAP now)   Do you have any problems affording any of your prescribed medications? No   Is the patient taking medications as prescribed? yes   Does the patient have transportation home? Yes   Transportation Anticipated family or friend will provide   Dialysis Name and Scheduled days   (N/A)   Does the patient receive services at the [facility]? No   Discharge Plan A Home with family   Discharge Plan B Home with family   DME Needed Upon Discharge  none   Patient/Family in Agreement with  Plan yes

## 2019-08-29 NOTE — SUBJECTIVE & OBJECTIVE
Interval History: s/p sleeve gastrectomy on 8/28   Patient was hypertensive overnight to 118 at approximately 0300. She was hypertensive to  post operatively, which was resolved with PRN medications. Otherwise vitals stable.   NPO with intermittent nausea controlled with prn medications. No emesis.   Ambulated to the bathroom. Voiding well.     Medications:  Continuous Infusions:   sodium chloride 0.9% 125 mL/hr at 08/28/19 1515    hydromorphone in 0.9 % NaCl 6 mg/30 ml       Scheduled Meds:   enoxparin  40 mg Subcutaneous Q12H    famotidine (PF)  20 mg Intravenous BID     PRN Meds:Dextrose 10% Bolus, glucagon (human recombinant), hydrocodone-apap 7.5-325 MG/15 ML, insulin aspart U-100, labetalol, naloxone, ondansetron, promethazine (PHENERGAN) IVPB     Review of patient's allergies indicates:   Allergen Reactions    Strawberry Anaphylaxis     Objective:     Vital Signs (Most Recent):  Temp: 97.1 °F (36.2 °C) (08/29/19 0311)  Pulse: (!) 118 (08/29/19 0311)  Resp: 18 (08/29/19 0311)  BP: 138/71 (08/29/19 0311)  SpO2: (!) 94 % (08/29/19 0311) Vital Signs (24h Range):  Temp:  [97.1 °F (36.2 °C)-98.5 °F (36.9 °C)] 97.1 °F (36.2 °C)  Pulse:  [] 118  Resp:  [14-25] 18  SpO2:  [92 %-100 %] 94 %  BP: (124-203)/(63-97) 138/71     Weight: (!) 145.5 kg (320 lb 12.3 oz)  Body mass index is 53.38 kg/m².    Intake/Output - Last 3 Shifts       08/27 0700 - 08/28 0659 08/28 0700 - 08/29 0659 08/29 0700 - 08/30 0659    P.O.  0     I.V. (mL/kg)  1343.8 (9.2)     Total Intake(mL/kg)  1343.8 (9.2)     Urine (mL/kg/hr)  500     Total Output  500     Net  +843.8            Urine Occurrence  0 x           Physical Exam   Constitutional: She is oriented to person, place, and time. She appears well-developed and well-nourished. No distress.   Well appearing. Sitting up in bed    Cardiovascular: Normal rate, regular rhythm and intact distal pulses.   Pulmonary/Chest: Effort normal. No respiratory distress.   Abdominal:  Soft. She exhibits no distension.   Lap incisions c/d/i. Dermabond in place.   Musculoskeletal: Normal range of motion. She exhibits no edema.   Neurological: She is alert and oriented to person, place, and time.   Skin: Skin is warm. She is not diaphoretic.       Significant Labs:  CBC:   Recent Labs   Lab 08/29/19 0316   WBC 11.36   RBC 4.06   HGB 12.1   HCT 37.7      MCV 93   MCH 29.8   MCHC 32.1     BMP:   Recent Labs   Lab 08/29/19 0316   *      K 4.4      CO2 20*   BUN 9   CREATININE 0.7   CALCIUM 9.0   MG 1.8     CMP:   Recent Labs   Lab 08/29/19 0316   *   CALCIUM 9.0      K 4.4   CO2 20*      BUN 9   CREATININE 0.7       Significant Diagnostics:  No recent imaging

## 2019-08-30 VITALS
SYSTOLIC BLOOD PRESSURE: 132 MMHG | WEIGHT: 293 LBS | OXYGEN SATURATION: 96 % | TEMPERATURE: 96 F | BODY MASS INDEX: 48.82 KG/M2 | HEART RATE: 85 BPM | DIASTOLIC BLOOD PRESSURE: 64 MMHG | HEIGHT: 65 IN | RESPIRATION RATE: 18 BRPM

## 2019-08-30 LAB
POCT GLUCOSE: 115 MG/DL (ref 70–110)
POCT GLUCOSE: 116 MG/DL (ref 70–110)

## 2019-08-30 PROCEDURE — 63600175 PHARM REV CODE 636 W HCPCS: Performed by: STUDENT IN AN ORGANIZED HEALTH CARE EDUCATION/TRAINING PROGRAM

## 2019-08-30 PROCEDURE — 25000003 PHARM REV CODE 250: Performed by: STUDENT IN AN ORGANIZED HEALTH CARE EDUCATION/TRAINING PROGRAM

## 2019-08-30 RX ORDER — ONDANSETRON 4 MG/1
4 TABLET, FILM COATED ORAL EVERY 6 HOURS PRN
Qty: 20 TABLET | Refills: 0 | Status: SHIPPED | OUTPATIENT
Start: 2019-08-30 | End: 2020-11-04

## 2019-08-30 RX ORDER — HYDROCODONE BITARTRATE AND ACETAMINOPHEN 7.5; 325 MG/15ML; MG/15ML
15 SOLUTION ORAL EVERY 4 HOURS PRN
Qty: 420 ML | Refills: 0 | Status: SHIPPED | OUTPATIENT
Start: 2019-08-30 | End: 2019-09-20

## 2019-08-30 RX ADMIN — HYDROCODONE BITARTRATE AND ACETAMINOPHEN 15 ML: 7.5; 325 SOLUTION ORAL at 05:08

## 2019-08-30 RX ADMIN — PROMETHAZINE HYDROCHLORIDE 12.5 MG: 6.25 SOLUTION ORAL at 04:08

## 2019-08-30 RX ADMIN — FAMOTIDINE 20 MG: 20 TABLET, FILM COATED ORAL at 09:08

## 2019-08-30 RX ADMIN — PROMETHAZINE HYDROCHLORIDE 12.5 MG: 6.25 SOLUTION ORAL at 09:08

## 2019-08-30 RX ADMIN — ENOXAPARIN SODIUM 40 MG: 100 INJECTION SUBCUTANEOUS at 09:08

## 2019-08-30 RX ADMIN — ONDANSETRON HYDROCHLORIDE 4 MG: 4 TABLET, FILM COATED ORAL at 05:08

## 2019-08-30 RX ADMIN — ONDANSETRON HYDROCHLORIDE 4 MG: 4 TABLET, FILM COATED ORAL at 12:08

## 2019-08-30 NOTE — HOSPITAL COURSE
Pt underwent gastric sleeve 8/28/2019. See operative note for procedure details. Tolerated procedure well. Post-operative course was uncomplicated. All post-operative milestones were met without delay. She was tolerating diet, ambulating, voiding spontaneously, and her pain was well controlled. Patient was instructed and educated on discharge instructions

## 2019-08-30 NOTE — ASSESSMENT & PLAN NOTE
54 yo female s/p sleeve gastrectomy on 8/28    -Bariatric clear diet   -Hycet for pain   -PRN antiemetics   -Labetaolol PRN SBP  -Lovenox 40mg BID and SCDs(DVT ppx)  -Famotidine (GI ppx)  -Encourage ambulation and out of bed to chair   -Encourage IS  -Plan for likely discharge today

## 2019-08-30 NOTE — PLAN OF CARE
08/30/19 1532   Final Note   Assessment Type Final Discharge Note   Anticipated Discharge Disposition Home   What phone number can be called within the next 1-3 days to see how you are doing after discharge?   ([phone])   Hospital Follow Up  Appt(s) scheduled? Yes   Discharge plans and expectations educations in teach back method with documentation complete? Yes   Right Care Referral Info   Post Acute Recommendation No Care

## 2019-08-30 NOTE — DISCHARGE SUMMARY
[facility]  General Surgery  Discharge Summary      Patient Name: Alivia Thomas  MRN: [id number]  Admission Date: 8/28/2019  Hospital Length of Stay: 2 days  Discharge Date and Time:  08/30/2019 3:14 PM  Attending Physician: Heriberto Clements MD   Discharging Provider: Phan Che MD  Primary Care Provider: Clarisse Richardson MD    HPI:   53 y.o female morbidly obese.  Has lost 30 pounds on liquid diet.  No CP or SOB.  Pt denies any problems with anesthesia in the past. Pt reports no history of bleeding diathesis, no significant neck DJD, and no steroids in the last year.  Pt stopped insulin recently due to low blood sugar since 30 pound weight loss.  Was seen in bariatric clinic for weight loss surgery, she choose gastric sleeve gastrectomy.     Procedure(s) (LRB):  GASTRECTOMY, SLEEVE, LAPAROSCOPIC with intraop EGD (N/A)      Indwelling Lines/Drains at time of discharge:   Lines/Drains/Airways          None        Hospital Course: Pt underwent gastric sleeve 8/28/2019. See operative note for procedure details. Tolerated procedure well. Post-operative course was uncomplicated. All post-operative milestones were met without delay. She was tolerating diet, ambulating, voiding spontaneously, and her pain was well controlled. Patient was instructed and educated on discharge instructions        Consults:     Significant Diagnostic Studies: Labs:   BMP:   Recent Labs   Lab 08/29/19  0316   *      K 4.4      CO2 20*   BUN 9   CREATININE 0.7   CALCIUM 9.0   MG 1.8       Pending Diagnostic Studies:     None        Final Active Diagnoses:    Diagnosis Date Noted POA    PRINCIPAL PROBLEM:  Morbid obesity [E66.01]  Yes      Problems Resolved During this Admission:      Discharged Condition: good    Disposition: Home or Self Care    Follow Up:  Follow-up Information     Heriberto Clements MD On 9/11/2019.    Specialties:  General Surgery, Bariatrics  Why:  Post-op  Appt: 9/11/19 at 09:30 AM.  Contact information:  [address]  [facility] [id number]  [phone]                 Patient Instructions:      Diet clear liquid     Notify your health care provider if you experience any of the following:  increased confusion or weakness     Notify your health care provider if you experience any of the following:  persistent dizziness, light-headedness, or visual disturbances     Notify your health care provider if you experience any of the following:  worsening rash     Notify your health care provider if you experience any of the following:  severe persistent headache     Notify your health care provider if you experience any of the following:  difficulty breathing or increased cough     Notify your health care provider if you experience any of the following:  redness, tenderness, or signs of infection (pain, swelling, redness, odor or green/yellow discharge around incision site)     Notify your health care provider if you experience any of the following:  severe uncontrolled pain     Notify your health care provider if you experience any of the following:  persistent nausea and vomiting or diarrhea     Notify your health care provider if you experience any of the following:  temperature >100.4     No driving until:   Scheduling Instructions: Off pain medications     Other restrictions (specify):   Order Comments: Do not wet incision for 48 hours after surgery, may shower after that time.  At that time, wash gently with warm soap and water at least once a day.  Do not scrub hard.  Do not soak incision in water for 2 weeks. Skin glue will fall off on its own (10-14 days).     Lifting restrictions   Scheduling Instructions: Do not lift more than 10lbs in 6 weeks     Medications:  Reconciled Home Medications:      Medication List      START taking these medications    hydrocodone-apap 7.5-325 MG/15 ML oral solution  Commonly known as:  HYCET  Take 15 mLs by mouth every 4 (four)  "hours as needed.     ondansetron 4 MG tablet  Commonly known as:  ZOFRAN  Take 1 tablet (4 mg total) by mouth every 6 (six) hours as needed.        CHANGE how you take these medications    atorvastatin 20 MG tablet  Commonly known as:  LIPITOR  Take 1 tablet (20 mg total) by mouth once daily.  What changed:  when to take this     FLUoxetine 40 MG capsule  Take 1 capsule (40 mg total) by mouth once daily.  What changed:  when to take this        CONTINUE taking these medications    aspirin 81 MG EC tablet  Commonly known as:  ECOTRIN  Take 1 tablet by mouth every morning. prevents heart attacks and strokes     b complex vitamins tablet  Take 1 tablet by mouth every morning.     * blood sugar diagnostic Strp  Freestyle light strips and lancets     * blood sugar diagnostic Strp  Check glucose four times daily Strips and lancets covered by insurance E11.9 - One touch     blood-glucose meter kit  Check glucose four times daily E11.9 meter covered by insurance One Touch     CENTRUM 3,500-18-0.4 unit-mg-mg Chew  Generic drug:  multivit-iron-min-folic acid  Take 1 tablet by mouth 2 (two) times daily.     FERROUS GLUCONATE ORAL  Take 150 mg by mouth daily with dinner or evening meal.     fluticasone propionate 50 mcg/actuation nasal spray  Commonly known as:  FLONASE  1 spray by Each Nare route 2 (two) times daily as needed for Rhinitis.     metFORMIN 500 MG 24 hr tablet  Commonly known as:  GLUCOPHAGE-XR  Take 2 tablets (1,000 mg total) by mouth 2 (two) times daily.     omeprazole 20 MG capsule  Commonly known as:  PRILOSEC  Take 1 capsule (20 mg total) by mouth every morning.     pen needle, diabetic 33 gauge x 5/32" Ndle  1 application by Misc.(Non-Drug; Combo Route) route 4 (four) times daily with meals and nightly.     VENTOLIN HFA 90 mcg/actuation inhaler  Generic drug:  albuterol  INHALE TWO PUFFS INTO LUNGS EVERY 4 TO 6 HOURS AS NEEDED FOR SHORTNESS OF BREATH AND FOR WHEEZING     VITAMIN B-12 1000 MCG tablet  Generic " drug:  cyanocobalamin  Take 100 mcg by mouth every morning.     vitamin D 1000 units Tab  Commonly known as:  VITAMIN D3  Take 5,000 mg by mouth every morning.         * This list has 2 medication(s) that are the same as other medications prescribed for you. Read the directions carefully, and ask your doctor or other care provider to review them with you.            STOP taking these medications    acetaminophen-codeine 300-30mg 300-30 mg Tab  Commonly known as:  TYLENOL #3     oxyCODONE-acetaminophen  mg per tablet  Commonly known as:  PERCOCET          Time spent on the discharge of patient: 20 minutes    Phan Che MD  General Surgery  [facility]

## 2019-08-30 NOTE — HPI
53 y.o female morbidly obese.  Has lost 30 pounds on liquid diet.  No CP or SOB.  Pt denies any problems with anesthesia in the past. Pt reports no history of bleeding diathesis, no significant neck DJD, and no steroids in the last year.  Pt stopped insulin recently due to low blood sugar since 30 pound weight loss. Was seen in bariatric clinic for weight loss surgery, she choose gastric sleeve gastrectomy.

## 2019-08-30 NOTE — PROGRESS NOTES
[facility]  General Surgery  Progress Note    Subjective:     History of Present Illness:  No notes on file    Post-Op Info:  Procedure(s) (LRB):  GASTRECTOMY, SLEEVE, LAPAROSCOPIC with intraop EGD (N/A)   2 Days Post-Op     Interval History: s/p sleeve gastrectomy on 8/28   No acute events overnight. Vitals stable. Afebrile.   Main complaint nasal congestion. Previous head ache. Abdominal pain controlled with hycet. Tolerating clear liquid diet with minimal nausea. No emesis.   Ambulated several times yesterday and once this morning. Voiding well.     Medications:  Continuous Infusions:    Scheduled Meds:   enoxparin  40 mg Subcutaneous Q12H    famotidine  20 mg Oral BID     PRN Meds:Dextrose 10% Bolus, glucagon (human recombinant), hydrocodone-apap 7.5-325 MG/15 ML, insulin aspart U-100, labetalol, naloxone, ondansetron, promethazine     Review of patient's allergies indicates:   Allergen Reactions    Strawberry Anaphylaxis     Objective:     Vital Signs (Most Recent):  Temp: 97.9 °F (36.6 °C) (08/30/19 0458)  Pulse: 92 (08/30/19 0458)  Resp: 18 (08/30/19 0458)  BP: (!) 145/84 (08/30/19 0458)  SpO2: (!) 91 % (08/30/19 0458) Vital Signs (24h Range):  Temp:  [97.1 °F (36.2 °C)-99.1 °F (37.3 °C)] 97.9 °F (36.6 °C)  Pulse:  [77-99] 92  Resp:  [] 18  SpO2:  [90 %-95 %] 91 %  BP: (123-171)/(63-84) 145/84     Weight: (!) 145.5 kg (320 lb 12.3 oz)  Body mass index is 53.38 kg/m².    Intake/Output - Last 3 Shifts       08/28 0700 - 08/29 0659 08/29 0700 - 08/30 0659    P.O. 0     I.V. (mL/kg) 1343.8 (9.2)     Total Intake(mL/kg) 1343.8 (9.2)     Urine (mL/kg/hr) 500     Total Output 500     Net +843.8           Urine Occurrence 0 x           Physical Exam   Constitutional: She is oriented to person, place, and time. She appears well-developed and well-nourished. No distress.   Well appearing. Sitting up in bed    Cardiovascular: Normal rate, regular rhythm and intact distal pulses.    Pulmonary/Chest: Effort normal. No respiratory distress.   Abdominal: Soft. She exhibits no distension.   Lap incisions c/d/i. Dermabond in place.   Musculoskeletal: Normal range of motion. She exhibits no edema.   Neurological: She is alert and oriented to person, place, and time.   Skin: Skin is warm. She is not diaphoretic.       Significant Labs:  CBC:   Recent Labs   Lab 08/29/19 0316   WBC 11.36   RBC 4.06   HGB 12.1   HCT 37.7      MCV 93   MCH 29.8   MCHC 32.1     BMP:   Recent Labs   Lab 08/29/19  0316   *      K 4.4      CO2 20*   BUN 9   CREATININE 0.7   CALCIUM 9.0   MG 1.8     CMP:   Recent Labs   Lab 08/29/19  0316   *   CALCIUM 9.0      K 4.4   CO2 20*      BUN 9   CREATININE 0.7       Significant Diagnostics:  No recent imaging    Assessment/Plan:     * Morbid obesity  52 yo female s/p sleeve gastrectomy on 8/28    -Bariatric clear diet   -Hycet for pain   -PRN antiemetics   -Labetaolol PRN SBP  -Lovenox 40mg BID and SCDs(DVT ppx)  -Famotidine (GI ppx)  -Encourage ambulation and out of bed to chair   -Encourage IS  -Plan for likely discharge today         Rox Ruby MD  General Surgery  [facility]

## 2019-08-30 NOTE — SUBJECTIVE & OBJECTIVE
Interval History: s/p sleeve gastrectomy on 8/28   No acute events overnight. Vitals stable. Afebrile.   Main complaint nasal congestion. Previous head ache. Abdominal pain controlled with hycet. Tolerating clear liquid diet with minimal nausea. No emesis.   Ambulated several times yesterday and once this morning. Voiding well.     Medications:  Continuous Infusions:    Scheduled Meds:   enoxparin  40 mg Subcutaneous Q12H    famotidine  20 mg Oral BID     PRN Meds:Dextrose 10% Bolus, glucagon (human recombinant), hydrocodone-apap 7.5-325 MG/15 ML, insulin aspart U-100, labetalol, naloxone, ondansetron, promethazine     Review of patient's allergies indicates:   Allergen Reactions    Strawberry Anaphylaxis     Objective:     Vital Signs (Most Recent):  Temp: 97.9 °F (36.6 °C) (08/30/19 0458)  Pulse: 92 (08/30/19 0458)  Resp: 18 (08/30/19 0458)  BP: (!) 145/84 (08/30/19 0458)  SpO2: (!) 91 % (08/30/19 0458) Vital Signs (24h Range):  Temp:  [97.1 °F (36.2 °C)-99.1 °F (37.3 °C)] 97.9 °F (36.6 °C)  Pulse:  [77-99] 92  Resp:  [] 18  SpO2:  [90 %-95 %] 91 %  BP: (123-171)/(63-84) 145/84     Weight: (!) 145.5 kg (320 lb 12.3 oz)  Body mass index is 53.38 kg/m².    Intake/Output - Last 3 Shifts       08/28 0700 - 08/29 0659 08/29 0700 - 08/30 0659    P.O. 0     I.V. (mL/kg) 1343.8 (9.2)     Total Intake(mL/kg) 1343.8 (9.2)     Urine (mL/kg/hr) 500     Total Output 500     Net +843.8           Urine Occurrence 0 x           Physical Exam   Constitutional: She is oriented to person, place, and time. She appears well-developed and well-nourished. No distress.   Well appearing. Sitting up in bed    Cardiovascular: Normal rate, regular rhythm and intact distal pulses.   Pulmonary/Chest: Effort normal. No respiratory distress.   Abdominal: Soft. She exhibits no distension.   Lap incisions c/d/i. Dermabond in place.   Musculoskeletal: Normal range of motion. She exhibits no edema.   Neurological: She is alert and oriented  to person, place, and time.   Skin: Skin is warm. She is not diaphoretic.       Significant Labs:  CBC:   Recent Labs   Lab 08/29/19 0316   WBC 11.36   RBC 4.06   HGB 12.1   HCT 37.7      MCV 93   MCH 29.8   MCHC 32.1     BMP:   Recent Labs   Lab 08/29/19 0316   *      K 4.4      CO2 20*   BUN 9   CREATININE 0.7   CALCIUM 9.0   MG 1.8     CMP:   Recent Labs   Lab 08/29/19 0316   *   CALCIUM 9.0      K 4.4   CO2 20*      BUN 9   CREATININE 0.7       Significant Diagnostics:  No recent imaging

## 2019-09-03 ENCOUNTER — PATIENT OUTREACH (OUTPATIENT)
Dept: ADMINISTRATIVE | Facility: CLINIC | Age: 54
End: 2019-09-03

## 2019-09-03 NOTE — PATIENT INSTRUCTIONS
Common Laparoscopic Procedures in Women  Laparoscopy is a technique for doing surgery. It uses a thin, lighted tube with a camera called a laparoscope. The scope is put through 1 small skin incision. Surgical tools are put through other small incisions. Many reproductive organ surgeries are done using this technique. The following are common reasons for laparoscopy in women.  For endometriosis  Laparoscopy can help detect and treat endometriosis. During the surgery, some or all abnormal tissue may be removed. Treatment can help relieve pain. In some cases, fertility can be restored.  For adhesions  During laparoscopy, adhesions may be found and removed. Removing them may relieve your pain. Adhesions are cut. Structures bound by the scar tissue are freed. In some cases, fertility can be restored.  Laparoscopically assisted vaginal hysterectomy (LAVH)  This is a procedure that can be used to remove your uterus. Using laparoscopy, your doctor detaches the uterus from its supporting structures. The uterus is then removed through the vagina.  For ovarian cysts or tumors  An ovarian cyst or tumor can be found during laparoscopy. It may or may not be treated at the same time. Your doctor will discuss your options with you.  For fibroids  Fibroids are growths inside the uterus or within the uterine walls. They can also be found attached to the outside of the uterus. Removing fibroids can help relieve severe cramping or heavy menstrual bleeding. This can be done with laparoscopy.  For ectopic pregnancy  An ectopic pregnancy may be found during laparoscopy. The fetal tissue lodged in the fallopian tube can be removed. Sometimes, all or part of the affected fallopian tube may be removed.  For infertility  Laparoscopy may help find causes of infertility. Some common causes are blocked fallopian tubes, endometriosis, and adhesions. These problems may also be treated using laparoscopy.  Tubal ligation  Tubal ligation is  done to prevent pregnancy. It can be done using laparoscopy. The doctor seals off each fallopian tube. This keeps sperm from being able to reach and fertilize an egg.  Pelvic organ prolapse and incontinence  This is a procedure that can be done to restore normal anatomy or function.  Date Last Reviewed: 5/13/2015  © 9502-3587 [employer]. [address] [place] [address], [place] [address]. All rights reserved. This information is not intended as a substitute for professional medical care. Always follow your healthcare professional's instructions.

## 2019-09-04 ENCOUNTER — TELEPHONE (OUTPATIENT)
Dept: BARIATRICS | Facility: CLINIC | Age: 54
End: 2019-09-04

## 2019-09-04 NOTE — TELEPHONE ENCOUNTER
Called to check in 1 week post op from bariatric surgery.    Water protocol began at 7 am and completed while in hospital/ medicine cups given to you by nursing to take home (y/n):y    Dehydration assessment:  Urine output/color:gold at least 3 times per day  Chest pain:n  Persistent increased heart rate:n  Fatigue:yes lack of energy  N/V: n  Dizzy/weak: weakness tired  BM:plenty- dark  Protein and fluid intake assessment: (food diary)  Fluid intake: 2 bottles 16.9 ounces, kristy in bottle, lima bean broth, broth from chicken noodle broth diluted with water no food, sf popscicles  Protein supplements: premier banana and clear  Protein intake yesterday: 1.5  Vitamins  -What vitamins are you taking?y cut pill cutter  -Are you tolerating well?y  Medications  Omeprazole:y  Hycet:n  How are you tolerating pain at this time? (rate on a scale from 1 to 10; >7 notify PA/MD)7 from back and knees- using tylenol pm and small pc of percocet, 0 pain abdomen/bariatric surgery   Are you having any problems? (f/u with PCP, cardiologist, endocrinologist)n took off insulin before during pre op liquid  How is your support system at home?good  Exercise reminder (light exercise at this time, no lifting above 10 lbs)yes     Questions for nurse/MA/PA:      Assessment:  Doing well.     Discussion:  Continue to work on fluid and protein intake.     Confirmed date and time for 2 week po labs and clinic visit

## 2019-09-06 ENCOUNTER — PATIENT OUTREACH (OUTPATIENT)
Dept: ADMINISTRATIVE | Facility: HOSPITAL | Age: 54
End: 2019-09-06

## 2019-09-06 DIAGNOSIS — E11.9 TYPE 2 DIABETES MELLITUS WITHOUT COMPLICATION, UNSPECIFIED WHETHER LONG TERM INSULIN USE: Primary | ICD-10-CM

## 2019-09-06 NOTE — PROGRESS NOTES
Chart review completed. Pt due for urine micro- order placed & linked/sched to upcoming lab appt. Pt also due for repeat colonoscopy and shingles vaccine.

## 2019-09-10 NOTE — PROGRESS NOTES
BARIATRIC FOLLOW UP:    Chief Complaint   Patient presents with    Follow-up     2 week post op cj clements        HISTORY OF PRESENT ILLNESS: Alivia Thomas is a 53 y.o. female with a Body mass index is 52.57 kg/m². who presents for follow up s/p 2 week visit with Dr. Clements on 8/8/2019.  she is doing well and tolerating the diet without difficulty.      Pt states that she is doing well, dealing with slight fatigue. States that right after surgery she had some diarrhea which has resolved but now stools are formed, sometimes hard so she takes Miralax.    States that before surgery her PCP stopped her insulin and stopped her lisinopril. States that she has been having some hypoglycemia episodes so she stopped her metformin about one week ago. States running around 84-90 fasting.    Review of Systems   Constitutional: Positive for malaise/fatigue.   HENT: Negative for tinnitus.    Eyes: Negative for blurred vision and double vision.   Respiratory: Negative for cough and shortness of breath.    Cardiovascular: Negative for chest pain and leg swelling.   Gastrointestinal: Positive for constipation and diarrhea. Negative for abdominal pain, heartburn, nausea and vomiting.   Genitourinary: Negative for dysuria and frequency.   Musculoskeletal: Negative for back pain, falls, joint pain and neck pain.   Skin: Negative for rash.   Neurological: Negative for dizziness, tingling and weakness.   Psychiatric/Behavioral: Negative for suicidal ideas. The patient is not nervous/anxious.        EXERCISE & VITAMINS:  Adherent with bariatric vitamin regimen  Exercise- walking     MEDICATIONS/ALLERGIES:  Have been reviewed.    DIET:  Liquid Bariatric Diet.  2-3 protein shakes daily, ~ 75- 90 grams protein.  48+ oz H20 and Clear SF Liquids.        Vitals:    09/11/19 1009   BP: 117/86   Pulse: 88       Physical Exam   Constitutional: She is oriented to person, place, and time. She appears well-developed and well-nourished.    HENT:   Head: Normocephalic and atraumatic.   Eyes: Pupils are equal, round, and reactive to light. Conjunctivae and EOM are normal.   Neck: Normal range of motion. Neck supple. No JVD present. No thyromegaly present.   Cardiovascular: Normal rate, regular rhythm, normal heart sounds and intact distal pulses.   No murmur heard.  Pulmonary/Chest: Effort normal and breath sounds normal. She has no wheezes.   Abdominal: Soft. Bowel sounds are normal. There is no tenderness. No hernia.   Musculoskeletal: Normal range of motion. She exhibits no edema or tenderness.   Neurological: She is alert and oriented to person, place, and time. Coordination normal.   Skin: Skin is warm and dry. Capillary refill takes less than 2 seconds. No rash noted. She is not diaphoretic. No erythema.   Psychiatric: She has a normal mood and affect. Her behavior is normal. Judgment and thought content normal.       ASSESSMENT:  - Morbid obesity, Body mass index is 52.57 kg/m².,  s/p sleeve gastrectomy on 08/28/2019.  - Estimated goal weight is 50 % EWL  - Co-morbidities: PADDY, HLD, DM type 2, GERD  - Good Weight loss, 30 lbs, 15 % EWL  - No Exercise regimen  - Good Vitamin Regimen  - Good Diet    PLAN:  - Discussed f/u with PCP to monitor blood sugars ( message sent to PCP)   - Advance diet per dietician   - Regular exercise and adherence to bariatric diet to achieve maximum weight loss.  - Follow-up with dietician to advance/re enforce diet.  - Full bariatric vitamin regimen  - Ursodiol 500 mg daily for 6 months for the gallbladder.  - Anti-Acid medication, Omeprazole daily for 3 months.  - Lifting restriction, no lifting more than 10 lbs for 6 weeks total.  - Miralax daily for constipation, no fiber.  - No NSAIDs, Tylenol for pain.  - RTC per post op schedule.  - Call the office for any issues.  - Check labs as scheduled.    20 minute visit, over 50% of time spent counseling patient face to face on diet, exercise, and weight loss.

## 2019-09-11 ENCOUNTER — CLINICAL SUPPORT (OUTPATIENT)
Dept: BARIATRICS | Facility: CLINIC | Age: 54
End: 2019-09-11
Payer: MEDICARE

## 2019-09-11 ENCOUNTER — LAB VISIT (OUTPATIENT)
Dept: LAB | Facility: HOSPITAL | Age: 54
End: 2019-09-11
Payer: MEDICARE

## 2019-09-11 ENCOUNTER — OFFICE VISIT (OUTPATIENT)
Dept: BARIATRICS | Facility: CLINIC | Age: 54
End: 2019-09-11
Payer: MEDICARE

## 2019-09-11 VITALS
BODY MASS INDEX: 48.82 KG/M2 | WEIGHT: 293 LBS | HEIGHT: 65 IN | DIASTOLIC BLOOD PRESSURE: 86 MMHG | SYSTOLIC BLOOD PRESSURE: 117 MMHG | HEART RATE: 88 BPM

## 2019-09-11 DIAGNOSIS — F11.90 CHRONIC, CONTINUOUS USE OF OPIOIDS: ICD-10-CM

## 2019-09-11 DIAGNOSIS — E78.5 HYPERLIPIDEMIA, UNSPECIFIED HYPERLIPIDEMIA TYPE: ICD-10-CM

## 2019-09-11 DIAGNOSIS — R63.4 WEIGHT LOSS: ICD-10-CM

## 2019-09-11 DIAGNOSIS — G47.33 OSA ON CPAP: ICD-10-CM

## 2019-09-11 DIAGNOSIS — Z98.84 S/P LAPAROSCOPIC SLEEVE GASTRECTOMY: ICD-10-CM

## 2019-09-11 DIAGNOSIS — Z98.890 POST-OPERATIVE STATE: Primary | ICD-10-CM

## 2019-09-11 DIAGNOSIS — E66.01 MORBID OBESITY: ICD-10-CM

## 2019-09-11 DIAGNOSIS — E11.65 UNCONTROLLED TYPE 2 DIABETES MELLITUS WITH HYPERGLYCEMIA: ICD-10-CM

## 2019-09-11 DIAGNOSIS — D53.1 COMBINED B12 AND FOLATE DEFICIENCY ANEMIA: ICD-10-CM

## 2019-09-11 DIAGNOSIS — K21.9 GASTROESOPHAGEAL REFLUX DISEASE, ESOPHAGITIS PRESENCE NOT SPECIFIED: ICD-10-CM

## 2019-09-11 LAB
ALBUMIN SERPL BCP-MCNC: 3.7 G/DL (ref 3.5–5.2)
ALP SERPL-CCNC: 69 U/L (ref 55–135)
ALT SERPL W/O P-5'-P-CCNC: 16 U/L (ref 10–44)
ANION GAP SERPL CALC-SCNC: 11 MMOL/L (ref 8–16)
AST SERPL-CCNC: 18 U/L (ref 10–40)
BASOPHILS # BLD AUTO: 0.05 K/UL (ref 0–0.2)
BASOPHILS NFR BLD: 0.6 % (ref 0–1.9)
BILIRUB SERPL-MCNC: 0.7 MG/DL (ref 0.1–1)
BUN SERPL-MCNC: 15 MG/DL (ref 6–20)
CALCIUM SERPL-MCNC: 9.8 MG/DL (ref 8.7–10.5)
CHLORIDE SERPL-SCNC: 108 MMOL/L (ref 95–110)
CO2 SERPL-SCNC: 24 MMOL/L (ref 23–29)
CREAT SERPL-MCNC: 0.8 MG/DL (ref 0.5–1.4)
DIFFERENTIAL METHOD: ABNORMAL
EOSINOPHIL # BLD AUTO: 0.3 K/UL (ref 0–0.5)
EOSINOPHIL NFR BLD: 3.2 % (ref 0–8)
ERYTHROCYTE [DISTWIDTH] IN BLOOD BY AUTOMATED COUNT: 14.9 % (ref 11.5–14.5)
EST. GFR  (AFRICAN AMERICAN): >60 ML/MIN/1.73 M^2
EST. GFR  (NON AFRICAN AMERICAN): >60 ML/MIN/1.73 M^2
GLUCOSE SERPL-MCNC: 121 MG/DL (ref 70–110)
HCT VFR BLD AUTO: 42.1 % (ref 37–48.5)
HGB BLD-MCNC: 12.9 G/DL (ref 12–16)
IMM GRANULOCYTES # BLD AUTO: 0.02 K/UL (ref 0–0.04)
IMM GRANULOCYTES NFR BLD AUTO: 0.2 % (ref 0–0.5)
LYMPHOCYTES # BLD AUTO: 1.7 K/UL (ref 1–4.8)
LYMPHOCYTES NFR BLD: 20 % (ref 18–48)
MCH RBC QN AUTO: 29.3 PG (ref 27–31)
MCHC RBC AUTO-ENTMCNC: 30.6 G/DL (ref 32–36)
MCV RBC AUTO: 96 FL (ref 82–98)
MONOCYTES # BLD AUTO: 0.7 K/UL (ref 0.3–1)
MONOCYTES NFR BLD: 8.4 % (ref 4–15)
NEUTROPHILS # BLD AUTO: 5.6 K/UL (ref 1.8–7.7)
NEUTROPHILS NFR BLD: 67.6 % (ref 38–73)
NRBC BLD-RTO: 0 /100 WBC
PLATELET # BLD AUTO: 287 K/UL (ref 150–350)
PMV BLD AUTO: 12.1 FL (ref 9.2–12.9)
POTASSIUM SERPL-SCNC: 3.9 MMOL/L (ref 3.5–5.1)
PROT SERPL-MCNC: 7.7 G/DL (ref 6–8.4)
RBC # BLD AUTO: 4.41 M/UL (ref 4–5.4)
SODIUM SERPL-SCNC: 143 MMOL/L (ref 136–145)
VIT B12 SERPL-MCNC: >2000 PG/ML (ref 210–950)
WBC # BLD AUTO: 8.25 K/UL (ref 3.9–12.7)

## 2019-09-11 PROCEDURE — 99499 UNLISTED E&M SERVICE: CPT | Mod: S$PBB,,, | Performed by: DIETITIAN, REGISTERED

## 2019-09-11 PROCEDURE — 99024 POSTOP FOLLOW-UP VISIT: CPT | Mod: POP,,, | Performed by: PHYSICIAN ASSISTANT

## 2019-09-11 PROCEDURE — 99999 PR PBB SHADOW E&M-EST. PATIENT-LVL III: CPT | Mod: PBBFAC,,, | Performed by: PHYSICIAN ASSISTANT

## 2019-09-11 PROCEDURE — 99024 PR POST-OP FOLLOW-UP VISIT: ICD-10-PCS | Mod: POP,,, | Performed by: PHYSICIAN ASSISTANT

## 2019-09-11 PROCEDURE — 85025 COMPLETE CBC W/AUTO DIFF WBC: CPT

## 2019-09-11 PROCEDURE — 99499 NO LOS: ICD-10-PCS | Mod: S$PBB,,, | Performed by: DIETITIAN, REGISTERED

## 2019-09-11 PROCEDURE — 82607 VITAMIN B-12: CPT

## 2019-09-11 PROCEDURE — 99999 PR PBB SHADOW E&M-EST. PATIENT-LVL III: ICD-10-PCS | Mod: PBBFAC,,, | Performed by: PHYSICIAN ASSISTANT

## 2019-09-11 PROCEDURE — 36415 COLL VENOUS BLD VENIPUNCTURE: CPT

## 2019-09-11 PROCEDURE — 99213 OFFICE O/P EST LOW 20 MIN: CPT | Mod: PBBFAC | Performed by: PHYSICIAN ASSISTANT

## 2019-09-11 PROCEDURE — 84425 ASSAY OF VITAMIN B-1: CPT

## 2019-09-11 PROCEDURE — 80053 COMPREHEN METABOLIC PANEL: CPT

## 2019-09-11 NOTE — PROGRESS NOTES
NUTRITION NOTE    Referring Physician: Heriberto Clements M.D.  Reason for MNT Referral: Follow-up 2 Weeks s/p Gastric Bypass    PAST MEDICAL HISTORY:  Denies nausea, vomiting, constipation and diarrhea.  Reports doing well.    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 female.    CURRENT DIET:  Bariatric Liquid Diet    Diet Recall: 65-80 grams of protein/day; 48 oz of fluids/day    Diet Includes: 1 cup bean soup daily  Protein Supplements: Premier shakes, 1.5/day. Premier Clear 20g prot.    EXERCISE:  None.    Restrictions to Exercise: fatigue    VITAMINS/MINERALS:  See BA    ASSESSMENT:  Doing well overall.  Adequate protein intake.  Adequate fluid intake.    BARIATRIC DIET DISCUSSION:  Instructed and provided written materials on bariatric pureed diet plan.  Reinforced post-op nutrition guidelines.    PLAN/RECOMMONDATIONS:  Advance to bariatric pureed diet.  Maintain protein intake.  Maintain fluid intake.  Begin light exercise as eugenia.  Continue appropriate vitamins & minerals.    Return to clinic in 2 weeks.    SESSION TIME: 15 minutes

## 2019-09-11 NOTE — LETTER
September 11, 2019      [address]  [place] [address]  Phone: [phone]  Fax: [phone]       Patient: Alivia Thomas   YOB: 1965  Date of Visit: 09/11/2019    To Whom It May Concern:    Tammi Thomas  was at [facility] on 09/11/2019. She had a surgical procedure at [facility] on 8/28/2019. She cannot climb up stairs until her 12 week visit. If you have any questions or concerns, or if I can be of further assistance, please do not hesitate to contact me.    Sincerely,      Serge Garcia PA-C

## 2019-09-11 NOTE — LETTER
September 11, 2019      [address]  [place] [address]  Phone: [phone]  Fax: [phone]       Patient: Alivia Thomas   YOB: 1965  Date of Visit: 09/11/2019    To Whom It May Concern:      Nichole Loaiza was at [facility] on 09/11/2019 for an immediate post operative visit. She may return to work on 9/12/2019. If you have any questions or concerns, or if I can be of further assistance, please do not hesitate to contact me.    Sincerely,    Serge Garcia PA-C

## 2019-09-12 DIAGNOSIS — G89.29 BILATERAL CHRONIC KNEE PAIN: ICD-10-CM

## 2019-09-12 DIAGNOSIS — M25.561 BILATERAL CHRONIC KNEE PAIN: ICD-10-CM

## 2019-09-12 DIAGNOSIS — G89.4 CHRONIC PAIN DISORDER: ICD-10-CM

## 2019-09-12 DIAGNOSIS — M51.36 DDD (DEGENERATIVE DISC DISEASE), LUMBAR: ICD-10-CM

## 2019-09-12 DIAGNOSIS — M47.816 SPONDYLOSIS OF LUMBAR REGION WITHOUT MYELOPATHY OR RADICULOPATHY: ICD-10-CM

## 2019-09-12 DIAGNOSIS — M47.816 FACET ARTHRITIS OF LUMBAR REGION: ICD-10-CM

## 2019-09-12 DIAGNOSIS — M25.562 BILATERAL CHRONIC KNEE PAIN: ICD-10-CM

## 2019-09-12 DIAGNOSIS — Z79.891 ENCOUNTER FOR LONG-TERM OPIATE ANALGESIC USE: ICD-10-CM

## 2019-09-12 DIAGNOSIS — Z96.651 STATUS POST TOTAL RIGHT KNEE REPLACEMENT: ICD-10-CM

## 2019-09-12 DIAGNOSIS — M17.0 PRIMARY OSTEOARTHRITIS OF BOTH KNEES: ICD-10-CM

## 2019-09-12 RX ORDER — OXYCODONE AND ACETAMINOPHEN 10; 325 MG/1; MG/1
1 TABLET ORAL EVERY 8 HOURS PRN
Qty: 90 TABLET | Refills: 0 | Status: SHIPPED | OUTPATIENT
Start: 2019-09-14 | End: 2019-09-16 | Stop reason: SDUPTHER

## 2019-09-12 NOTE — TELEPHONE ENCOUNTER
Patient requesting refill on Percocet 10-325mg  Last office visit 07.16.19   shows last refill on 08.16.19  Patient does have a pain contract on file with [facility]  Patient last UDS 02.26.19 was consistent with current therapy

## 2019-09-12 NOTE — TELEPHONE ENCOUNTER
----- Message from Lynnette Woodall sent at 9/12/2019 11:56 AM CDT -----  Contact: DONTAE MOON [[id number]]  Can the clinic reply in [facility]: no    Please refill the medication(s) listed below. The patient can be reached at this phone number once it is called into the pharmacy [phone]    Medication #1: oxyCODONE-acetaminophen (PERCOCET)  mg per tablet    Preferred Pharmacy: [facility] - [address]

## 2019-09-16 ENCOUNTER — TELEPHONE (OUTPATIENT)
Dept: OBSTETRICS AND GYNECOLOGY | Facility: CLINIC | Age: 54
End: 2019-09-16

## 2019-09-16 DIAGNOSIS — Z96.651 STATUS POST TOTAL RIGHT KNEE REPLACEMENT: ICD-10-CM

## 2019-09-16 DIAGNOSIS — M25.562 BILATERAL CHRONIC KNEE PAIN: ICD-10-CM

## 2019-09-16 DIAGNOSIS — M17.0 PRIMARY OSTEOARTHRITIS OF BOTH KNEES: ICD-10-CM

## 2019-09-16 DIAGNOSIS — M25.561 BILATERAL CHRONIC KNEE PAIN: ICD-10-CM

## 2019-09-16 DIAGNOSIS — Z79.891 ENCOUNTER FOR LONG-TERM OPIATE ANALGESIC USE: ICD-10-CM

## 2019-09-16 DIAGNOSIS — M51.36 DDD (DEGENERATIVE DISC DISEASE), LUMBAR: ICD-10-CM

## 2019-09-16 DIAGNOSIS — G89.4 CHRONIC PAIN DISORDER: ICD-10-CM

## 2019-09-16 DIAGNOSIS — M47.816 SPONDYLOSIS OF LUMBAR REGION WITHOUT MYELOPATHY OR RADICULOPATHY: ICD-10-CM

## 2019-09-16 DIAGNOSIS — G89.29 BILATERAL CHRONIC KNEE PAIN: ICD-10-CM

## 2019-09-16 DIAGNOSIS — M47.816 FACET ARTHRITIS OF LUMBAR REGION: ICD-10-CM

## 2019-09-16 RX ORDER — OXYCODONE AND ACETAMINOPHEN 10; 325 MG/1; MG/1
1 TABLET ORAL EVERY 8 HOURS PRN
Qty: 90 TABLET | Refills: 0 | Status: SHIPPED | OUTPATIENT
Start: 2019-09-16 | End: 2019-10-16 | Stop reason: SDUPTHER

## 2019-09-16 NOTE — TELEPHONE ENCOUNTER
I spoke to the pt and gave her the results of her embx and scheduled her an appt for her 3 month follow up.

## 2019-09-16 NOTE — TELEPHONE ENCOUNTER
----- Message from Dianne Draper sent at 9/16/2019  9:22 AM CDT -----  Contact: DONTAE MOON [[id number]]  Can the clinic reply in [facility]:     Please refill the medication(s) listed below. The patient can be reached at this phone number once it is called into the pharmacy.    Medication #1 oxyCODONE-acetaminophen (PERCOCET)  mg per tablet 90 tablet     Medication #2    Preferred Pharmacy: [facility] [address]

## 2019-09-16 NOTE — TELEPHONE ENCOUNTER
----- Message from Leeanne Silva MD sent at 9/14/2019  9:55 PM CDT -----  Please call patient to inform her of benign endometrial biopsy.  Please assure that patient keeps her follow-up appointment if scheduled or that she has a follow up appointment in 3 months if therapy has been initiated.  If patient has further questions, please forward information to me.

## 2019-09-16 NOTE — TELEPHONE ENCOUNTER
Patient requesting refill onoxyCODONE-acetaminophen (PERCOCET)  mg per tablet 90 tablet     Last office visit 07/16/2019     shows last refill on 08/16/2019    Patient does not have a pain contract on file with [facility] department    Patient last UDS 02/26/2019 was consistent with current therapy    Please review.  Thanks

## 2019-09-17 DIAGNOSIS — M25.561 CHRONIC PAIN OF BOTH KNEES: ICD-10-CM

## 2019-09-17 DIAGNOSIS — M25.562 CHRONIC PAIN OF BOTH KNEES: ICD-10-CM

## 2019-09-17 DIAGNOSIS — G89.29 CHRONIC PAIN OF BOTH KNEES: ICD-10-CM

## 2019-09-17 NOTE — TELEPHONE ENCOUNTER
Contacted and spoke with patient informing her that the medication was called into her pharmacy.    Patient verbalized understanding.

## 2019-09-19 LAB — VIT B1 BLD-MCNC: >160 UG/L (ref 38–122)

## 2019-09-20 ENCOUNTER — HOSPITAL ENCOUNTER (OUTPATIENT)
Dept: RADIOLOGY | Facility: HOSPITAL | Age: 54
Discharge: HOME | End: 2019-09-20
Attending: INTERNAL MEDICINE
Payer: MEDICARE

## 2019-09-20 ENCOUNTER — OFFICE VISIT (OUTPATIENT)
Dept: INTERNAL MEDICINE | Facility: CLINIC | Age: 54
End: 2019-09-20
Payer: MEDICARE

## 2019-09-20 VITALS
WEIGHT: 293 LBS | OXYGEN SATURATION: 96 % | BODY MASS INDEX: 48.82 KG/M2 | SYSTOLIC BLOOD PRESSURE: 108 MMHG | TEMPERATURE: 98 F | HEART RATE: 60 BPM | DIASTOLIC BLOOD PRESSURE: 66 MMHG | HEIGHT: 65 IN

## 2019-09-20 DIAGNOSIS — R53.83 FATIGUE, UNSPECIFIED TYPE: ICD-10-CM

## 2019-09-20 DIAGNOSIS — M79.642 LEFT HAND PAIN: Primary | ICD-10-CM

## 2019-09-20 DIAGNOSIS — M10.9 GOUT, UNSPECIFIED CAUSE, UNSPECIFIED CHRONICITY, UNSPECIFIED SITE: ICD-10-CM

## 2019-09-20 DIAGNOSIS — M79.642 LEFT HAND PAIN: ICD-10-CM

## 2019-09-20 DIAGNOSIS — E11.65 UNCONTROLLED TYPE 2 DIABETES MELLITUS WITH HYPERGLYCEMIA: ICD-10-CM

## 2019-09-20 PROCEDURE — 99999 PR PBB SHADOW E&M-EST. PATIENT-LVL V: CPT | Mod: PBBFAC,,, | Performed by: INTERNAL MEDICINE

## 2019-09-20 PROCEDURE — 73130 X-RAY EXAM OF HAND: CPT | Mod: 26,50,, | Performed by: RADIOLOGY

## 2019-09-20 PROCEDURE — 99999 PR PBB SHADOW E&M-EST. PATIENT-LVL V: ICD-10-PCS | Mod: PBBFAC,,, | Performed by: INTERNAL MEDICINE

## 2019-09-20 PROCEDURE — 73130 XR HAND COMPLETE 3 VIEWS BILATERAL: ICD-10-PCS | Mod: 26,50,, | Performed by: RADIOLOGY

## 2019-09-20 PROCEDURE — 99213 OFFICE O/P EST LOW 20 MIN: CPT | Mod: S$PBB,,, | Performed by: INTERNAL MEDICINE

## 2019-09-20 PROCEDURE — 73130 X-RAY EXAM OF HAND: CPT | Mod: 50,TC,FY,PO

## 2019-09-20 PROCEDURE — 99213 PR OFFICE/OUTPT VISIT, EST, LEVL III, 20-29 MIN: ICD-10-PCS | Mod: S$PBB,,, | Performed by: INTERNAL MEDICINE

## 2019-09-20 PROCEDURE — 99215 OFFICE O/P EST HI 40 MIN: CPT | Mod: PBBFAC,25,PO | Performed by: INTERNAL MEDICINE

## 2019-09-20 RX ORDER — COLCHICINE 0.6 MG/1
TABLET ORAL
Qty: 30 TABLET | Refills: 1 | Status: SHIPPED | OUTPATIENT
Start: 2019-09-20 | End: 2019-12-12

## 2019-09-20 RX ORDER — CELECOXIB 200 MG/1
200 CAPSULE ORAL DAILY
Qty: 30 CAPSULE | Refills: 1 | OUTPATIENT
Start: 2019-09-20

## 2019-09-20 NOTE — PROGRESS NOTES
"Subjective:       Patient ID: Alivia Thomas is a 53 y.o. female.    Chief Complaint: Hospital Follow Up (slightly "under the weather", body aches back pain, right hand pain, both feet on top); Fatigue; and Constipation    HPIPt with Pain in 1st metatarsal joint on L foot.  Pain in L hand at MCP joints - feels it is gout - declines steroids in light of recent gastric sleeve surgery.  No CP or SOB.  She is fatigued.  Review of Systems   Constitutional: Positive for fatigue. Negative for fever.   Respiratory: Negative for shortness of breath (PND or orthopnea).    Cardiovascular: Negative for chest pain (arm pain or jaw pain).   Gastrointestinal: Negative for abdominal pain, diarrhea, nausea and vomiting.   Genitourinary: Negative for dysuria.   Neurological: Negative for seizures, syncope and headaches.       Objective:      Physical Exam   Constitutional: She is oriented to person, place, and time. She appears well-developed and well-nourished. No distress.   HENT:   Head: Normocephalic.   Mouth/Throat: Oropharynx is clear and moist.   Neck: Neck supple. No JVD present. No thyromegaly present.   Cardiovascular: Normal rate, regular rhythm, normal heart sounds and intact distal pulses. Exam reveals no gallop and no friction rub.   No murmur heard.  Pulmonary/Chest: Effort normal and breath sounds normal. She has no wheezes. She has no rales.   Abdominal: Soft. Bowel sounds are normal. She exhibits no distension and no mass. There is no tenderness. There is no rebound and no guarding.   Umbilicus is moist - no tenderness or redness   Musculoskeletal: She exhibits no edema.   Lymphadenopathy:     She has no cervical adenopathy.   Neurological: She is alert and oriented to person, place, and time. She has normal reflexes.   Skin: Skin is warm and dry.   Psychiatric: She has a normal mood and affect. Her behavior is normal. Judgment and thought content normal.       Assessment:       1. Left hand pain    2. Gout, " unspecified cause, unspecified chronicity, unspecified site    3. Fatigue, unspecified type    4. Uncontrolled type 2 diabetes mellitus with hyperglycemia        Plan:   Left hand pain  -     X-Ray Hand 3 View Bilateral; Future; Expected date: 09/20/2019    Gout, unspecified cause, unspecified chronicity, unspecified site  -     Uric acid; Future; Expected date: 09/20/2019  -     Sedimentation rate; Future; Expected date: 09/20/2019  -     C-reactive protein; Future; Expected date: 09/20/2019  -     SRIRAM Screen w/Reflex; Future; Expected date: 09/20/2019  -     Rheumatoid factor; Future; Expected date: 09/20/2019  -     Cyclic citrul peptide antibody, IgG; Future; Expected date: 09/20/2019    Fatigue, unspecified type  -     CBC auto differential; Future; Expected date: 09/20/2019  -     Comprehensive metabolic panel; Future; Expected date: 09/20/2019  -     TSH; Future; Expected date: 09/20/2019    Uncontrolled type 2 diabetes mellitus with hyperglycemia  -     Hemoglobin A1c; Future; Expected date: 09/20/2019    Other orders  -     colchicine (COLCRYS) 0.6 mg tablet; One twice daily for 5 days  Dispense: 30 tablet; Refill: 1    Look for autoimmune but probably gout due to asymmetry and pt with recent high protein    Will check lab try colchicine - may help bowels too and consider allopurinol when this episode is over.

## 2019-09-23 ENCOUNTER — TELEPHONE (OUTPATIENT)
Dept: INTERNAL MEDICINE | Facility: CLINIC | Age: 54
End: 2019-09-23

## 2019-09-23 ENCOUNTER — TELEPHONE (OUTPATIENT)
Dept: BARIATRICS | Facility: CLINIC | Age: 54
End: 2019-09-23

## 2019-09-23 NOTE — TELEPHONE ENCOUNTER
Spoke with pt.  Pt unsure of amt of thiamin in Super B-complex.  Recommended she take it every other day.  Pt v/u.----- Message from EMILY Clements sent at 9/20/2019  2:18 PM CDT -----  Please call MsJuan A Thomas. B1 is high, while B1 is water soluble, we should know how much B1 she is ingesting. We can consider decreased dosing.

## 2019-09-23 NOTE — TELEPHONE ENCOUNTER
----- Message from Clarisse Richardson MD sent at 9/22/2019  5:00 AM CDT -----  Please inform - Normal blood count (no anemia), normal thyroid - no reason for fatigue on labs - blood sugar is fine. Appears to be gout - uric acid is high - please ask if the colchicine is helping.

## 2019-09-25 ENCOUNTER — TELEPHONE (OUTPATIENT)
Dept: BARIATRICS | Facility: CLINIC | Age: 54
End: 2019-09-25

## 2019-09-25 NOTE — TELEPHONE ENCOUNTER
----- Message from Sara Milton sent at 9/24/2019  4:47 PM CDT -----  Contact: pt @ [phone]  Calling to speak with the Nutritionist, please call.

## 2019-09-30 ENCOUNTER — TELEPHONE (OUTPATIENT)
Dept: BARIATRICS | Facility: CLINIC | Age: 54
End: 2019-09-30

## 2019-10-02 ENCOUNTER — OFFICE VISIT (OUTPATIENT)
Dept: BARIATRICS | Facility: CLINIC | Age: 54
End: 2019-10-02
Payer: MEDICARE

## 2019-10-02 VITALS
HEART RATE: 82 BPM | SYSTOLIC BLOOD PRESSURE: 118 MMHG | BODY MASS INDEX: 48.82 KG/M2 | WEIGHT: 293 LBS | DIASTOLIC BLOOD PRESSURE: 74 MMHG | HEIGHT: 65 IN

## 2019-10-02 DIAGNOSIS — K59.00 CONSTIPATION, UNSPECIFIED CONSTIPATION TYPE: ICD-10-CM

## 2019-10-02 DIAGNOSIS — E11.65 UNCONTROLLED TYPE 2 DIABETES MELLITUS WITH HYPERGLYCEMIA: ICD-10-CM

## 2019-10-02 DIAGNOSIS — G47.33 OSA ON CPAP: ICD-10-CM

## 2019-10-02 DIAGNOSIS — Z98.84 S/P LAPAROSCOPIC SLEEVE GASTRECTOMY: Primary | ICD-10-CM

## 2019-10-02 DIAGNOSIS — F11.90 CHRONIC, CONTINUOUS USE OF OPIOIDS: ICD-10-CM

## 2019-10-02 DIAGNOSIS — E78.5 HYPERLIPIDEMIA, UNSPECIFIED HYPERLIPIDEMIA TYPE: ICD-10-CM

## 2019-10-02 PROCEDURE — 99213 OFFICE O/P EST LOW 20 MIN: CPT | Mod: PBBFAC | Performed by: SURGERY

## 2019-10-02 PROCEDURE — 99213 OFFICE O/P EST LOW 20 MIN: CPT | Mod: S$PBB,24,, | Performed by: SURGERY

## 2019-10-02 PROCEDURE — 99213 PR OFFICE/OUTPT VISIT, EST, LEVL III, 20-29 MIN: ICD-10-PCS | Mod: S$PBB,24,, | Performed by: SURGERY

## 2019-10-02 PROCEDURE — 99999 PR PBB SHADOW E&M-EST. PATIENT-LVL III: ICD-10-PCS | Mod: PBBFAC,,, | Performed by: SURGERY

## 2019-10-02 PROCEDURE — 99999 PR PBB SHADOW E&M-EST. PATIENT-LVL III: CPT | Mod: PBBFAC,,, | Performed by: SURGERY

## 2019-10-02 RX ORDER — LISINOPRIL 2.5 MG/1
TABLET ORAL
Refills: 10 | COMMUNITY
Start: 2019-09-16 | End: 2019-10-02

## 2019-10-02 NOTE — PROGRESS NOTES
BARIATRIC FOLLOW UP:    Chief Complaint   Patient presents with    Constipation    Back Pain       HISTORY OF PRESENT ILLNESS: Alivia Thomas is a 53 y.o. morbidly obese female with current BMI 51.76 kg/m2 and chronic pain on opioids, MDD, IDDM2, HTN, DLD, GERD and PADDY on CPAP, who presents with post-op follow-up s/p laparoscopic sleeve gastrectomy by Dr. Clements on 8/28/19. She comes in c/o lower back/abd/pelvic pain that radiates to her hips which she attributed to constipation. She states she hasn't had a good BM since surgery over a month ago and feels very heavy. She tried a Fleets enema and 1/3 bottle Mag Citrate but only produced a small amount of stool. She is otherwise doing well and denies other abd/epigastric pain, nausea, vomiting, dysphagia, or heartburn. She is doing well with her protein intake. She is voiding normally. She wishes she was losing more weight.    Vitals:    10/02/19 1142   BP: 118/74   Pulse: 82     Physical Exam   Constitutional: She is oriented to person, place, and time. She appears well-developed and well-nourished.   HENT:   Head: Normocephalic and atraumatic.   Neck: Normal range of motion.   Cardiovascular: Normal rate and regular rhythm.   Pulmonary/Chest: Effort normal. No respiratory distress.   Abdominal: Soft. She exhibits no distension. There is no tenderness. There is no guarding.   Musculoskeletal: Normal range of motion.   Neurological: She is alert and oriented to person, place, and time. Coordination normal.   Skin: Skin is warm and dry. No rash noted. She is not diaphoretic. No erythema.   Psychiatric: She has a normal mood and affect. Her behavior is normal.   Vitals reviewed.    ASSESSMENT:  Constipation    PLAN:  - Miralax 17g powder packet daily  - Take full bottle of Mag Citrate today and tomorrow  - Colase elixir 100mg qAM daily, then prn  - Smooth Move Senna Tea qHS nightly, then prn  - Fleets enema prn  - Continue regular exercise and adherence to  bariatric diet to achieve maximum weight loss  - Bariatric vitamin regimen  - Ursodiol 500mg daily x6 months post-op  - Omeprazole 40mg daily x3 months post-op  - Avoid NSAIDS  - Follow-up with Bariatric [facility] and RD as scheduled.    20 minute visit, over 50% of time spent counseling patient face to face on diet, exercise, weight loss and bowel regimen.    Neyda Goldberg  10/2/19

## 2019-10-09 RX ORDER — FLUCONAZOLE 150 MG/1
150 TABLET ORAL ONCE
Qty: 1 TABLET | Refills: 0 | Status: SHIPPED | OUTPATIENT
Start: 2019-10-09 | End: 2019-10-09

## 2019-10-14 ENCOUNTER — PATIENT OUTREACH (OUTPATIENT)
Dept: ADMINISTRATIVE | Facility: OTHER | Age: 54
End: 2019-10-14

## 2019-10-14 DIAGNOSIS — Z12.11 ENCOUNTER FOR FECAL IMMUNOCHEMICAL TEST SCREENING: Primary | ICD-10-CM

## 2019-10-16 ENCOUNTER — OFFICE VISIT (OUTPATIENT)
Dept: PAIN MEDICINE | Facility: CLINIC | Age: 54
End: 2019-10-16
Payer: MEDICARE

## 2019-10-16 VITALS
DIASTOLIC BLOOD PRESSURE: 86 MMHG | SYSTOLIC BLOOD PRESSURE: 127 MMHG | OXYGEN SATURATION: 100 % | WEIGHT: 293 LBS | HEART RATE: 73 BPM | TEMPERATURE: 98 F | RESPIRATION RATE: 18 BRPM | BODY MASS INDEX: 48.82 KG/M2 | HEIGHT: 65 IN

## 2019-10-16 DIAGNOSIS — Z79.891 ENCOUNTER FOR LONG-TERM OPIATE ANALGESIC USE: ICD-10-CM

## 2019-10-16 DIAGNOSIS — M25.562 BILATERAL CHRONIC KNEE PAIN: ICD-10-CM

## 2019-10-16 DIAGNOSIS — M47.819 OSTEOARTHRITIS OF SPINE WITHOUT MYELOPATHY OR RADICULOPATHY, UNSPECIFIED SPINAL REGION: ICD-10-CM

## 2019-10-16 DIAGNOSIS — M17.0 PRIMARY OSTEOARTHRITIS OF BOTH KNEES: ICD-10-CM

## 2019-10-16 DIAGNOSIS — M47.816 SPONDYLOSIS OF LUMBAR REGION WITHOUT MYELOPATHY OR RADICULOPATHY: ICD-10-CM

## 2019-10-16 DIAGNOSIS — G89.4 CHRONIC PAIN SYNDROME: ICD-10-CM

## 2019-10-16 DIAGNOSIS — G89.4 CHRONIC PAIN DISORDER: ICD-10-CM

## 2019-10-16 DIAGNOSIS — M51.36 DDD (DEGENERATIVE DISC DISEASE), LUMBAR: ICD-10-CM

## 2019-10-16 DIAGNOSIS — M47.816 FACET ARTHRITIS OF LUMBAR REGION: ICD-10-CM

## 2019-10-16 DIAGNOSIS — M47.816 LUMBAR SPONDYLOSIS: Primary | ICD-10-CM

## 2019-10-16 DIAGNOSIS — G89.29 BILATERAL CHRONIC KNEE PAIN: ICD-10-CM

## 2019-10-16 DIAGNOSIS — M25.561 BILATERAL CHRONIC KNEE PAIN: ICD-10-CM

## 2019-10-16 DIAGNOSIS — Z96.651 STATUS POST TOTAL RIGHT KNEE REPLACEMENT: ICD-10-CM

## 2019-10-16 PROCEDURE — 99999 PR PBB SHADOW E&M-EST. PATIENT-LVL III: CPT | Mod: PBBFAC,,, | Performed by: NURSE PRACTITIONER

## 2019-10-16 PROCEDURE — 99214 PR OFFICE/OUTPT VISIT, EST, LEVL IV, 30-39 MIN: ICD-10-PCS | Mod: S$PBB,,, | Performed by: NURSE PRACTITIONER

## 2019-10-16 PROCEDURE — 99213 OFFICE O/P EST LOW 20 MIN: CPT | Mod: PBBFAC | Performed by: NURSE PRACTITIONER

## 2019-10-16 PROCEDURE — 99999 PR PBB SHADOW E&M-EST. PATIENT-LVL III: ICD-10-PCS | Mod: PBBFAC,,, | Performed by: NURSE PRACTITIONER

## 2019-10-16 PROCEDURE — 99214 OFFICE O/P EST MOD 30 MIN: CPT | Mod: S$PBB,,, | Performed by: NURSE PRACTITIONER

## 2019-10-16 PROCEDURE — 80307 DRUG TEST PRSMV CHEM ANLYZR: CPT

## 2019-10-16 RX ORDER — OXYCODONE AND ACETAMINOPHEN 10; 325 MG/1; MG/1
1 TABLET ORAL EVERY 8 HOURS PRN
Qty: 90 TABLET | Refills: 0 | Status: SHIPPED | OUTPATIENT
Start: 2019-10-16 | End: 2019-11-12 | Stop reason: SDUPTHER

## 2019-10-16 NOTE — PROGRESS NOTES
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 ext weakness or paresthesias. She does not have back pain or pain radiating down her legs. The pain is better with Celebrex and rest. She also takes Vicodin ES TID but this makes her very sleepy. She is not sure it helps with the pain because she usually falls asleep.     Physical Therapy: not since 2011 just prior to her 3rd right knee surgery (revision after replacement); has tried swimming which helps with weight loss    Non-pharmacologic Treatment: none    Pain Medications: Percocet and celebrex    Blood thinners: ASA 81 mg daily     Interventional Therapies:   steroid inj and visco-supplementation (series of 3) in left knee- not helpful  3/31/14 Bilateral genicular nerve block- significant relief of left knee, limited relief of right knee pain  2/16/16 Bilateral L2,3,4,5 MBB  3/1/16 Bilateral L2,3,4,5 MBB   4/6/16 Left L2,3,4,5 RFA- significant relief  4/20/16 Right L2,3,4,5 RFA- significant relief  10/5/16 Left L2,3,4,5 cooled RFA  10/19/16 Right L2,3,4,5 cooled RFA  4/26/17 Left L2,3,4,5 cooled RFA  5/9/17 Right L2,3,4,5 cooled RFA  12/26/2017- Left L2,3,4,5 cooled RFA  1/9/2018- Right L2,3,4,5 cooled RFA  6/26/18 Left L2,3,4,5 cooled RFA- 60% relief  7/10/18 Right L2,3,4,5 cooled RFA- 60% relief  9/28/18 TPIs- significant relief  12/27/18 Left L2,3,4,5 RFA- 70% relief  1/29/19 Right L2,3,4,5 RFA-  70% relief  6/18/19 Left L2,3,4,5 RFA- 70% relief  7/9/19 Right L2,3,4,5 RFA- 50% relief      Relevant Surgeries: right knee surgery x3 (arthroscopic, then replacement and subsequent revision surgery), s/p left total knee arthroplasty    Relevant Imaging:  Xray Lumbar spine 09/21/2015  Lumbar spine radiograph    Comparison: None    Results: AP, lateral neutral, lateral flexion , lateral extension, bilateral oblique and spot views. The alignment of the lumbar spine demonstrates a mild levoscoliosis . 11-mm anterior listhesis of L4 relative to L5 with no translational abnormalities  seen on flexion and extension views. The vertebral body heights are well-maintained , mild disk space narrowing L4-L5 and L5-S1. Mild anterior and marginal osteophyte formation seen throughout the lumbar spine . The oblique views demonstrate no   definite spondylolysis. There is facet joint osseous hypertrophy noted at L3-L4 and L4-L5.      Impression         The Significant spondylosis of the lumbar spine with grade 1 anterior listhesis L4 relative to L5.  Facet joint osseous hypertrophy L3-L4 and L4-5.      Electronically signed by: BLESSING ROE MD  Date: 09/21/15  Time: 09:56          X-ray knee bilateral 8/26/2015:  Standing AP knees, lateral view of both knees and sunrise view of both patella. Study compared to May 2015. Postop change of bilateral knee replacement. The prosthetic components are in satisfactory position. Erosive changes involving the patella   again evident bilaterally.    Impression no significant change.      Xray Bilateral Knee 05/25/2015:  There are bilateral total knee arthroplasties with posterior resurfacing of the patella. As observed on 12/17/2014 there is a fracture of the left patella in the parasagittal plane.    Heterotopic bone is evident about each knee.    I detect no dislocation, unusual radiopaque retained foreign body, lytic or blastic lesion, or chondrocalcinosis.    Cervical MRI  4/24/2018:    Narrative     EXAMINATION:  MRI CERVICAL SPINE WITHOUT CONTRAST    CLINICAL HISTORY:  Neck pain, first study;.  Cervicalgia.    TECHNIQUE:  Multiplanar, multisequence MR images of the cervical spine were acquired without the administration of contrast.    COMPARISON:  None.    FINDINGS:  There is reversal of the normal cervical lordosis which could be related to patient positioning versus muscle spasm.    Cervical vertebral body heights are well maintained without evidence of acute fracture or dislocation.  Marrow signal is unremarkable.    Spinal canal contents are unremarkable.  No abnormal masses or collections.    Individual levels as detailed below:    C2-3: No significant spinal canal stenosis or neuroforaminal narrowing.    C3-4: Facet arthropathy.  No significant spinal canal stenosis or neuroforaminal narrowing.    C4-5: Facet arthropathy.  Small broad-based disc osteophyte complex.  Mild right neuroforaminal narrowing.  Mild spinal canal stenosis.    C5-6: Facet arthropathy.  Uncovertebral joint spurring.  Broad-based posterior disc osteophyte complex.  Mild right neuroforaminal narrowing.  Mild spinal canal stenosis.    C6-7: Facet arthropathy.  No significant spinal canal stenosis or neuroforaminal narrowing.    C7-T1: No significant spinal canal stenosis or neuroforaminal narrowing.    Paraspinal muscles are unremarkable.  Soft tissues are intact.   Impression       Mild multilevel cervical spondylosis with mild spinal canal stenosis and mild right neuroforaminal narrowing at C4-5 and C5-6.       Lab Results   Component Value Date    HGBA1C 6.4 (H) 09/20/2019     Lab Results   Component Value Date    CHOL 161 01/23/2019    CHOL 152 05/17/2018    CHOL 191 05/09/2017     Lab Results   Component Value Date    HDL 51 01/23/2019    HDL 50 05/17/2018    HDL 48 05/09/2017     Lab Results   Component Value Date    LDLCALC 94.2 01/23/2019    LDLCALC 91.0 05/17/2018    LDLCALC 129.0 05/09/2017      Lab Results   Component Value Date    TRIG 79 01/23/2019    TRIG 55 05/17/2018    TRIG 70 05/09/2017     Lab Results   Component Value Date    CHOLHDL 31.7 01/23/2019    CHOLHDL 32.9 05/17/2018    CHOLHDL 25.1 05/09/2017         Past Medical History:   Diagnosis Date    Allergy     Anemia     Asthma     Bilateral shoulder bursitis     Cervical stenosis of spine     Chronic pain     DDD (degenerative disc disease), cervical     Depression 1/28/2019    Diabetes mellitus type II     DJD (degenerative joint disease) of knee 6/19/2014    Facet arthritis of lumbar region 12/17/2015    Facet syndrome 12/17/2015    GERD (gastroesophageal reflux disease)     Heartburn     Hyperlipidemia     Hypertension     Morbid obesity     Neuromuscular disorder     PADDY (obstructive sleep apnea)     Proteinuria     Right carpal tunnel syndrome     Sacroiliitis 6/13/2018    Sacroiliitis     Sleep apnea      Past Surgical History:   Procedure Laterality Date    CARPAL TUNNEL RELEASE      CARPAL TUNNEL RELEASE  1980s    left    CARPAL TUNNEL RELEASE  2012    right    ESOPHAGOGASTRODUODENOSCOPY N/A 3/27/2019    Procedure: EGD (ESOPHAGOGASTRODUODENOSCOPY);  Surgeon: Robbin Bar MD;  Location: [address]);  Service: Endoscopy;  Laterality: N/A;  BMI 61.3/2nd floor case/svn    JOINT REPLACEMENT Bilateral     with 2 revisions on rt    KNEE SURGERY  3/2010    orthroscope    KNEE SURGERY  6-19-14    left TKR    LAPAROSCOPIC SLEEVE GASTRECTOMY N/A 8/28/2019    Procedure: GASTRECTOMY, SLEEVE, LAPAROSCOPIC with intraop EGD;  Surgeon: Heriberto Clements MD;  Location: [facility] OR [address] [place] [address];  Service: General;  Laterality: N/A;    RADIOFREQUENCY ABLATION Right 7/9/2019    Procedure: RADIOFREQUENCY ABLATION;  Surgeon: Lina Wagner MD;  Location: [facility]IN [facility];  Service: Pain Management;  Laterality: Right;  RIGHT RFA L2,3,4,5  2 of 2    RADIOFREQUENCY ABLATION OF LUMBAR MEDIAL BRANCH NERVE AT  SINGLE LEVEL Left 6/26/2018    Procedure: RADIOFREQUENCY ABLATION, NERVE, MEDIAL BRANCH, LUMBAR, 1 LEVEL;  Surgeon: Lina Wagner MD;  Location: [facility] PAIN [facility]GT;  Service: Pain Management;  Laterality: Left;  Left Cooled RFA @ L2,3,4,5  [id number]  with Sedation    1 of 2    RADIOFREQUENCY ABLATION OF LUMBAR MEDIAL BRANCH NERVE AT SINGLE LEVEL Right 7/10/2018    Procedure: RADIOFREQUENCY ABLATION, NERVE, MEDIAL BRANCH, LUMBAR, 1 LEVEL;  Surgeon: Lina Wagner MD;  Location: [facility] PAIN [facility]GT;  Service: Pain Management;  Laterality: Right;  RIght Cooled RFA @ L2,3,4,5  [id number] with Sedation    2 of 2    TRIGGER FINGER RELEASE Right 2017    TRIGGER FINGER RELEASE Left 7/29/2019    Procedure: RELEASE, TRIGGER FINGER, Left Thumb;  Surgeon: Velma Garcia MD;  Location: [facility] OR;  Service: Orthopedics;  Laterality: Left;  Local w/ MAC     Family History   Problem Relation Age of Onset    Diabetes Mother     Cataracts Mother     Diabetes Father     Cataracts Father     Coronary artery disease Brother     Diabetes Brother     Hypertension Sister     Diabetes Sister     No Known Problems Sister     Cancer Sister         lymphoma     Diabetes Brother     Hypotension Brother     Kidney failure Brother     Hypotension Brother     Amblyopia Neg Hx     Blindness Neg Hx     Glaucoma Neg Hx     Macular degeneration Neg Hx     Retinal detachment Neg Hx     Strabismus Neg Hx     Stroke Neg Hx     Thyroid disease Neg Hx      Social History     Socioeconomic History    Marital status:      Spouse name: Not on file    Number of children: Not on file    Years of education: Not on file    Highest education level: Not on file   Occupational History    Not on file   Social Needs    Financial resource strain: Not on file    Food insecurity:     Worry: Not on file     Inability: Not on file    Transportation needs:     Medical: Not on file     Non-medical: Not on file   Tobacco Use     Smoking status: Never Smoker    Smokeless tobacco: Never Used   Substance and Sexual Activity    Alcohol use: Not Currently     Comment: occasionally     Drug use: No    Sexual activity: Yes     Partners: Female     Birth control/protection: None   Lifestyle    Physical activity:     Days per week: Not on file     Minutes per session: Not on file    Stress: Not on file   Relationships    Social connections:     Talks on phone: Not on file     Gets together: Not on file     Attends [employer] service: Not on file     Active member of club or organization: Not on file     Attends meetings of clubs or organizations: Not on file     Relationship status: Not on file   Other Topics Concern    Not on file   Social History Narrative    Disabled. The patient is the youngest of 6 siblings. Single. Lives with single-sex partner.                  Review of patient's allergies indicates:  No Known Allergies    Medication List with Changes/Refills   Current Medications    B COMPLEX VITAMINS TABLET    Take 1 tablet by mouth every other day.     COLCHICINE (COLCRYS) 0.6 MG TABLET    One twice daily for 5 days    CYANOCOBALAMIN (VITAMIN B-12) 1000 MCG TABLET    Take 100 mcg by mouth every morning.    FLUTICASONE (FLONASE) 50 MCG/ACTUATION NASAL SPRAY    1 spray by Each Nare route 2 (two) times daily as needed for Rhinitis.    MULTIVIT-IRON-MIN-FOLIC ACID 3,500-18-0.4 UNIT-MG-MG ORAL CHEW    Take 1 tablet by mouth 2 (two) times daily.     OMEPRAZOLE (PRILOSEC) 20 MG CAPSULE    Take 1 capsule (20 mg total) by mouth every morning.    ONDANSETRON (ZOFRAN) 4 MG TABLET    Take 1 tablet (4 mg total) by mouth every 6 (six) hours as needed.    OXYCODONE-ACETAMINOPHEN (PERCOCET)  MG PER TABLET    Take 1 tablet by mouth every 8 (eight) hours as needed for Pain. Greater than 7 day quantity medically necessary    VENTOLIN HFA 90 MCG/ACTUATION INHALER    INHALE TWO PUFFS INTO LUNGS EVERY 4 TO 6 HOURS AS NEEDED FOR SHORTNESS OF BREATH  "AND FOR WHEEZING    VITAMIN D 185 MG TAB    Take 5,000 mg by mouth every morning.          REVIEW OF SYSTEMS:    GENERAL:  Recent weight loss.  RESPIRATORY:  Negative for cough, wheezing or shortness of breath, patient denies any recent URI.  CARDIOVASCULAR:  Negative for chest pain, leg swelling or palpitations.  GI:  Negative for abdominal discomfort, blood in stools or black stools or change in bowel habits, occasional constipation.  MUSCULOSKELETAL:  See HPI.  SKIN:  Negative for lesions, rash, and itching.  PSYCH:  No mood disorder or recent psychosocial stressors.  Patient's sleep is disturbed secondary to pain (patient reports also that she has insomnia).  HEMATOLOGY/LYMPHOLOGY:  Negative for prolonged bleeding, bruising easily or swollen nodes.  81 mg aspirin  ENDO: Patient has a history of diabetes  NEURO:   No history of headaches, syncope, paralysis, seizures or tremors.  All other reviewed and negative other than HPI.    OBJECTIVE:    /86   Pulse 73   Temp 97.6 °F (36.4 °C)   Resp 18   Ht 5' 5" (1.651 m)   Wt (!) 141.7 kg (312 lb 6.3 oz)   LMP 06/26/2018   SpO2 100%   BMI 51.98 kg/m²     PHYSICAL EXAMINATION:    GENERAL: Well appearing, in no acute distress, alert and oriented x3.   PSYCH:  Mood and affect appropriate.  SKIN: Skin color, texture, turgor normal, no rashes or lesions.  HEAD/FACE:  Normocephalic, atraumatic.   CV: RRR with palpation of the radial artery.  PULM: No evidence of respiratory difficulty, symmetric chest rise.  BACK: Negative SLR bilaterally. There is pain to palpation over the lumbar facet joints and paraspinals bilaterally.  Limited ROM with pain on extension.  Positive facet loading bilaterally,  EXTREMITIES: Pain with palpation to bilateral SI joints.  JENNA is negative bilaterally. Well-healed midline scars to bilateral knees.  Painful extension and flexion of bilateral knees. Medial and lateral joint line tenderness to bilateral knees.  MUSCULOSKELETAL: " Bilateral upper and lower extremity strength is normal and symmetric.  No atrophy or tone abnormalities are noted.  NEURO: Bilateral lower extremity coordination and muscle stretch reflexes are physiologic and symmetric.  Plantar response are downgoing. No clonus.  No loss of sensation is noted.  GAIT: Antalgic- ambulates without assistance.      Assessment:       Encounter Diagnoses   Name Primary?   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     - Can schedule for repeat left then right L2,3,4,5 RFAs in December (6 months from previous).    - Continue oxycodone-acetaminophen 10/325 mg one tablet every 8 hours PRN pain, #90, 0 refills.  She can call for 2 additional refills.    - The patient is here today for a refill of current pain medications and they believe these provide effective pain control and improvements in quality of life.  The patient notes no serious side effects, and feels the benefits outweigh the risks.  The patient was reminded of the pain contract that they signed previously as well as the risks and benefits of the medication including possible death.  The updated [place] Board of Pharmacy prescription monitoring program was reviewed, and the patient has been found to be compliant with current treatment plan.     - UDS from 2/26/2019 reviewed and consistent.  Repeat today.    - Continue topical pain cream PRN up to 5x/day.  This has been helpful for her knees.    - RTC in 3 months or sooner if needed.    - Dr. Wagner was consulted on the patient and agrees with this plan.      The above plan and management options were discussed at length with patient. Patient is in agreement with the above and verbalized understanding.      EMILY Asencio  10/16/2019

## 2019-10-19 LAB
6MAM UR QL: NOT DETECTED
7AMINOCLONAZEPAM UR QL: NOT DETECTED
A-OH ALPRAZ UR QL: NOT DETECTED
ALPRAZ UR QL: NOT DETECTED
AMPHET UR QL SCN: NOT DETECTED
BARBITURATES UR QL: NOT DETECTED
BUPRENORPHINE UR QL: NOT DETECTED
BZE UR QL: NOT DETECTED
CARBOXYTHC UR QL: NOT DETECTED
CARISOPRODOL UR QL: NOT DETECTED
CLONAZEPAM UR QL: NOT DETECTED
CODEINE UR QL: NOT DETECTED
CREAT UR-MCNC: 163.4 MG/DL (ref 20–400)
DIAZEPAM UR QL: NOT DETECTED
ETHYL GLUCURONIDE UR QL: NOT DETECTED
FENTANYL UR QL: NOT DETECTED
HYDROCODONE UR QL: NOT DETECTED
HYDROMORPHONE UR QL: NOT DETECTED
LORAZEPAM UR QL: NOT DETECTED
MDA UR QL: NOT DETECTED
MDEA UR QL: NOT DETECTED
MDMA UR QL: NOT DETECTED
ME-PHENIDATE UR QL: NOT DETECTED
MEPERIDINE UR QL: NOT DETECTED
METHADONE UR QL: NOT DETECTED
METHAMPHET UR QL: NOT DETECTED
MIDAZOLAM UR QL SCN: NOT DETECTED
MORPHINE UR QL: NOT DETECTED
NORBUPRENORPHINE UR QL CFM: NOT DETECTED
NORDIAZEPAM UR QL: NOT DETECTED
NORFENTANYL UR QL: NOT DETECTED
NORHYDROCODONE UR QL CFM: NOT DETECTED
NOROXYCODONE UR QL CFM: PRESENT
NOROXYMORPHONE: NOT DETECTED
OXAZEPAM UR QL: NOT DETECTED
OXYCODONE UR QL: PRESENT
OXYMORPHONE UR QL: NOT DETECTED
PATHOLOGY STUDY: NORMAL
PCP UR QL: NOT DETECTED
PHENTERMINE UR QL: NOT DETECTED
PROPOXYPH UR QL: NOT DETECTED
SERVICE CMNT-IMP: NORMAL
TAPENTADOL UR QL SCN: NOT DETECTED
TAPENTADOL-O-SULF: NOT DETECTED
TEMAZEPAM UR QL: NOT DETECTED
TRAMADOL UR QL: NOT DETECTED
ZOLPIDEM UR QL: NOT DETECTED

## 2019-10-25 ENCOUNTER — PATIENT OUTREACH (OUTPATIENT)
Dept: ADMINISTRATIVE | Facility: OTHER | Age: 54
End: 2019-10-25

## 2019-11-12 DIAGNOSIS — M51.36 DDD (DEGENERATIVE DISC DISEASE), LUMBAR: ICD-10-CM

## 2019-11-12 DIAGNOSIS — Z96.651 STATUS POST TOTAL RIGHT KNEE REPLACEMENT: ICD-10-CM

## 2019-11-12 DIAGNOSIS — M25.561 BILATERAL CHRONIC KNEE PAIN: ICD-10-CM

## 2019-11-12 DIAGNOSIS — M47.816 FACET ARTHRITIS OF LUMBAR REGION: ICD-10-CM

## 2019-11-12 DIAGNOSIS — Z79.891 ENCOUNTER FOR LONG-TERM OPIATE ANALGESIC USE: ICD-10-CM

## 2019-11-12 DIAGNOSIS — G89.29 BILATERAL CHRONIC KNEE PAIN: ICD-10-CM

## 2019-11-12 DIAGNOSIS — M17.0 PRIMARY OSTEOARTHRITIS OF BOTH KNEES: ICD-10-CM

## 2019-11-12 DIAGNOSIS — M25.562 BILATERAL CHRONIC KNEE PAIN: ICD-10-CM

## 2019-11-12 DIAGNOSIS — M47.816 SPONDYLOSIS OF LUMBAR REGION WITHOUT MYELOPATHY OR RADICULOPATHY: ICD-10-CM

## 2019-11-12 DIAGNOSIS — G89.4 CHRONIC PAIN DISORDER: ICD-10-CM

## 2019-11-12 RX ORDER — OXYCODONE AND ACETAMINOPHEN 10; 325 MG/1; MG/1
1 TABLET ORAL EVERY 8 HOURS PRN
Qty: 90 TABLET | Refills: 0 | Status: SHIPPED | OUTPATIENT
Start: 2019-11-12 | End: 2019-12-12 | Stop reason: SDUPTHER

## 2019-11-12 NOTE — TELEPHONE ENCOUNTER
----- Message from Tim Bellamy sent at 11/12/2019  9:16 AM CST -----  Contact: Pt   Can the clinic reply in [facility]:     Please refill the medication(s) listed below. The patient can be reached at this phone number once it is called into the pharmacy.    Medication #1oxyCODONE-acetaminophen (PERCOCET)  mg per     Medication #2    [facility] [address]

## 2019-11-12 NOTE — TELEPHONE ENCOUNTER
Patient requesting refill on Oxycodone    Last office visit 10/16/2019   shows last refill on 10/16/2019  Patient does have a pain contract on file with [facility] department  Patient last UDS 10/16/2019 was consistent with current therapy

## 2019-11-14 ENCOUNTER — LAB VISIT (OUTPATIENT)
Dept: LAB | Facility: HOSPITAL | Age: 54
End: 2019-11-14
Payer: MEDICARE

## 2019-11-14 ENCOUNTER — CLINICAL SUPPORT (OUTPATIENT)
Dept: BARIATRICS | Facility: CLINIC | Age: 54
End: 2019-11-14
Payer: MEDICARE

## 2019-11-14 ENCOUNTER — OFFICE VISIT (OUTPATIENT)
Dept: BARIATRICS | Facility: CLINIC | Age: 54
End: 2019-11-14
Payer: MEDICARE

## 2019-11-14 VITALS
SYSTOLIC BLOOD PRESSURE: 128 MMHG | DIASTOLIC BLOOD PRESSURE: 75 MMHG | BODY MASS INDEX: 48.82 KG/M2 | WEIGHT: 293 LBS | HEART RATE: 76 BPM | HEIGHT: 65 IN

## 2019-11-14 DIAGNOSIS — E11.65 UNCONTROLLED TYPE 2 DIABETES MELLITUS WITH HYPERGLYCEMIA: ICD-10-CM

## 2019-11-14 DIAGNOSIS — K59.00 CONSTIPATION, UNSPECIFIED CONSTIPATION TYPE: ICD-10-CM

## 2019-11-14 DIAGNOSIS — F11.90 CHRONIC, CONTINUOUS USE OF OPIOIDS: ICD-10-CM

## 2019-11-14 DIAGNOSIS — G47.33 OSA ON CPAP: ICD-10-CM

## 2019-11-14 DIAGNOSIS — E66.01 MORBID OBESITY: ICD-10-CM

## 2019-11-14 DIAGNOSIS — D53.1 COMBINED B12 AND FOLATE DEFICIENCY ANEMIA: ICD-10-CM

## 2019-11-14 DIAGNOSIS — Z98.84 S/P LAPAROSCOPIC SLEEVE GASTRECTOMY: ICD-10-CM

## 2019-11-14 DIAGNOSIS — Z98.890 POSTOPERATIVE STATE: Primary | ICD-10-CM

## 2019-11-14 DIAGNOSIS — R63.4 WEIGHT LOSS: ICD-10-CM

## 2019-11-14 DIAGNOSIS — E78.5 HYPERLIPIDEMIA, UNSPECIFIED HYPERLIPIDEMIA TYPE: ICD-10-CM

## 2019-11-14 DIAGNOSIS — K21.9 GASTROESOPHAGEAL REFLUX DISEASE, ESOPHAGITIS PRESENCE NOT SPECIFIED: ICD-10-CM

## 2019-11-14 LAB
25(OH)D3+25(OH)D2 SERPL-MCNC: 66 NG/ML (ref 30–96)
ALBUMIN SERPL BCP-MCNC: 3.4 G/DL (ref 3.5–5.2)
ALP SERPL-CCNC: 58 U/L (ref 55–135)
ALT SERPL W/O P-5'-P-CCNC: 10 U/L (ref 10–44)
ANION GAP SERPL CALC-SCNC: 9 MMOL/L (ref 8–16)
AST SERPL-CCNC: 13 U/L (ref 10–40)
BASOPHILS # BLD AUTO: 0.04 K/UL (ref 0–0.2)
BASOPHILS NFR BLD: 0.6 % (ref 0–1.9)
BILIRUB SERPL-MCNC: 0.6 MG/DL (ref 0.1–1)
BUN SERPL-MCNC: 11 MG/DL (ref 6–20)
CALCIUM SERPL-MCNC: 9.4 MG/DL (ref 8.7–10.5)
CHLORIDE SERPL-SCNC: 106 MMOL/L (ref 95–110)
CHOLEST SERPL-MCNC: 221 MG/DL (ref 120–199)
CHOLEST/HDLC SERPL: 4.8 {RATIO} (ref 2–5)
CO2 SERPL-SCNC: 27 MMOL/L (ref 23–29)
CREAT SERPL-MCNC: 0.7 MG/DL (ref 0.5–1.4)
DIFFERENTIAL METHOD: ABNORMAL
EOSINOPHIL # BLD AUTO: 0.3 K/UL (ref 0–0.5)
EOSINOPHIL NFR BLD: 4.6 % (ref 0–8)
ERYTHROCYTE [DISTWIDTH] IN BLOOD BY AUTOMATED COUNT: 14 % (ref 11.5–14.5)
EST. GFR  (AFRICAN AMERICAN): >60 ML/MIN/1.73 M^2
EST. GFR  (NON AFRICAN AMERICAN): >60 ML/MIN/1.73 M^2
GLUCOSE SERPL-MCNC: 107 MG/DL (ref 70–110)
HCT VFR BLD AUTO: 40.4 % (ref 37–48.5)
HDLC SERPL-MCNC: 46 MG/DL (ref 40–75)
HDLC SERPL: 20.8 % (ref 20–50)
HGB BLD-MCNC: 12.4 G/DL (ref 12–16)
IMM GRANULOCYTES # BLD AUTO: 0.01 K/UL (ref 0–0.04)
IMM GRANULOCYTES NFR BLD AUTO: 0.2 % (ref 0–0.5)
IRON SERPL-MCNC: 59 UG/DL (ref 30–160)
LDLC SERPL CALC-MCNC: 157 MG/DL (ref 63–159)
LYMPHOCYTES # BLD AUTO: 2.2 K/UL (ref 1–4.8)
LYMPHOCYTES NFR BLD: 33.7 % (ref 18–48)
MCH RBC QN AUTO: 29.7 PG (ref 27–31)
MCHC RBC AUTO-ENTMCNC: 30.7 G/DL (ref 32–36)
MCV RBC AUTO: 97 FL (ref 82–98)
MONOCYTES # BLD AUTO: 0.5 K/UL (ref 0.3–1)
MONOCYTES NFR BLD: 7.8 % (ref 4–15)
NEUTROPHILS # BLD AUTO: 3.5 K/UL (ref 1.8–7.7)
NEUTROPHILS NFR BLD: 53.1 % (ref 38–73)
NONHDLC SERPL-MCNC: 175 MG/DL
NRBC BLD-RTO: 0 /100 WBC
PLATELET # BLD AUTO: 264 K/UL (ref 150–350)
PMV BLD AUTO: 12.3 FL (ref 9.2–12.9)
POTASSIUM SERPL-SCNC: 3.6 MMOL/L (ref 3.5–5.1)
PROT SERPL-MCNC: 7.8 G/DL (ref 6–8.4)
RBC # BLD AUTO: 4.18 M/UL (ref 4–5.4)
SATURATED IRON: 17 % (ref 20–50)
SODIUM SERPL-SCNC: 142 MMOL/L (ref 136–145)
TOTAL IRON BINDING CAPACITY: 351 UG/DL (ref 250–450)
TRANSFERRIN SERPL-MCNC: 237 MG/DL (ref 200–375)
TRIGL SERPL-MCNC: 90 MG/DL (ref 30–150)
VIT B12 SERPL-MCNC: 1155 PG/ML (ref 210–950)
WBC # BLD AUTO: 6.58 K/UL (ref 3.9–12.7)

## 2019-11-14 PROCEDURE — 80053 COMPREHEN METABOLIC PANEL: CPT

## 2019-11-14 PROCEDURE — 80061 LIPID PANEL: CPT

## 2019-11-14 PROCEDURE — 82607 VITAMIN B-12: CPT

## 2019-11-14 PROCEDURE — 99024 POSTOP FOLLOW-UP VISIT: CPT | Mod: POP,,, | Performed by: NURSE PRACTITIONER

## 2019-11-14 PROCEDURE — 99999 PR PBB SHADOW E&M-EST. PATIENT-LVL IV: ICD-10-PCS | Mod: PBBFAC,,, | Performed by: NURSE PRACTITIONER

## 2019-11-14 PROCEDURE — 99499 UNLISTED E&M SERVICE: CPT | Mod: S$PBB,,, | Performed by: DIETITIAN, REGISTERED

## 2019-11-14 PROCEDURE — 82306 VITAMIN D 25 HYDROXY: CPT

## 2019-11-14 PROCEDURE — 99024 PR POST-OP FOLLOW-UP VISIT: ICD-10-PCS | Mod: POP,,, | Performed by: NURSE PRACTITIONER

## 2019-11-14 PROCEDURE — 36415 COLL VENOUS BLD VENIPUNCTURE: CPT

## 2019-11-14 PROCEDURE — 99999 PR PBB SHADOW E&M-EST. PATIENT-LVL IV: CPT | Mod: PBBFAC,,, | Performed by: NURSE PRACTITIONER

## 2019-11-14 PROCEDURE — 83540 ASSAY OF IRON: CPT

## 2019-11-14 PROCEDURE — 84425 ASSAY OF VITAMIN B-1: CPT

## 2019-11-14 PROCEDURE — 99499 NO LOS: ICD-10-PCS | Mod: S$PBB,,, | Performed by: DIETITIAN, REGISTERED

## 2019-11-14 PROCEDURE — 85025 COMPLETE CBC W/AUTO DIFF WBC: CPT

## 2019-11-14 PROCEDURE — 99214 OFFICE O/P EST MOD 30 MIN: CPT | Mod: PBBFAC | Performed by: NURSE PRACTITIONER

## 2019-11-14 NOTE — PATIENT INSTRUCTIONS
How to taper off of Omeprazole:  - Take 1 Tablet every other day for 2 weeks.  If you do not experience any heartburn, indigestion, nausea symptoms, the following week, take 1 tablet every 3 days.  Again if you remain without the above symptoms, you may completely discontinue the medication.  If symptoms return at any point, please restart the medication.      Fruits and Vegetables       Include 1-2 servings of fruit daily.      1 serving of fruit includes ½ cup unsweetened applesauce, ½ medium banana, tennis ball size piece of fruit, 17 grapes, 1 cup melon, 1 cup strawberries, ¼ cup dried fruit     Include 2-3 servings of vegetables daily. 1 serving is 1 cup raw or ½ cup cooked.     Non-starchy vegetables include artichoke, asparagus, baby corn, bamboo shoots, beans: green/Italian/wax, bean sprouts, beets, broccoli, Bloomery sprouts, cabbage, carrots, cauliflower, celery, cucumber, eggplant, green onions or scallions, greens, jicama, leeks, mushrooms, okra, onions, pea pods, peppers, radishes, spinach, summer squash, tomatoes and salsa, turnips, vegetable juice cocktail, water chestnuts, zucchini      Remember these principles:   No liquids with meals. Do no drink 30 minutes before meals and wait 30 minutes to 1 hour after meals to start drinking.   Eat PROTEIN rich foods first at every meal or snack.   Sip on water, sugar-free beverages or non-fat milk throughout the day.  You will need to continue drinking at least 1 protein drink daily to meet protein needs.   100% fruit juice (no sugar added) is allowed, but limit to 4oz a day because it is high in calories and does not contain any protein.   Chew foods slowly; one meal should take 20-30 minutes.   Eat 5 meals (3 solid meals and 2 protein shakes) per day, without any additional snacking.   Stop eating as soon as you feel full.   Avoid using table sugar and foods made with refined sugar, which can trigger dumping syndrome.   Marinating meats with a  low sugar marinade, adding low-fat salad dressing, or adding low calorie gravy (made from powder and water) can help meats to digest easier.     May add:  Raw veggies  Lettuce: start with baby spinach leaves  Plain, Unsalted Nuts and Seeds: almonds, peanuts, pistachios.  1 serving daily or less  Protein bars with 0-4 grams of sugar, see attached handout    Snacks: (100-200 calories; >5g protein)    - 1 low-fat cheese stick with 8 cherry tomatoes or 1 serving fresh fruit  - 4 thin slices fat-free turkey breast and 1 slice low-fat cheese  - 4 thin slices fat-free honey ham with wedge of melon  - 2 slices of turkey weiss  - Boiled eggs (can buy at [employer] already boiled w/ shell removed)  - for convenience,  [place]ell read, snack, go (deli meat and cheese rolls)  - P3 packets (Protein packs w/ cheese, nuts, lean deli meat)  - MHP Fit and Lean Protein Pudding (find at [employer] - per 1 cup serving = 100 calories, 15 g protein, 0 g sugar)  - 6-8 edamame pods (equivalent to about 1/4 cup edamame without pods).   - 1/4 cup unsalted nuts with ½ cup fruit  - 6-oz container Dannon Light n Fit vanilla yogurt, topped with 1oz unsalted nuts         - apple, celery or baby carrots spread with 2 Tbsp PB2  - apple slices with 1 oz slice low-fat cheese  - Apple slices dipped in 2 Tbsp of PB2  - 2 Tbsp PB2 mixed in light or greek yogurt or sugar-free pudding  - celery, cucumber, bell pepper or baby carrots dipped in ¼ cup hummus bean spread   - celery, cucumber, baby carrots dipped in high protein greek yogurt (Mix 16 oz plain greek yogurt + 1 packet of hidden valley ranch dip mix)  - Catalino Links Beef Steak - 14g protein! (similar to beef jerky but very lean)  - 2 wedges Laughing Cow - Light Herb & Garlic Cheese with sliced cucumber or green bell pepper  - 1/2 cup low-fat cottage cheese with ¼ cup fruit or ¼ cup salsa  - 1/2 cup low fat cottage cheese with 10-15 cherry tomatoes  - 8 oz glass of FAIRLIFE fat free milk (13 g  "protein)  - 8 oz glass of FAIRLIFE fat free milk + 1 packet of sugar-free hot cocoa  - Add Atkins advantage Cafe Caramel shake to decaf coffee. Serve hot or blend with ice for "frappaccino" like drink  - RTD Protein drinks: Atkins, Low Carb Slim Fast, EAS light, Muscle Milk Light, etc.  - Homemade Protein drinks: GNC Soy95, Isopure, Nectar, UNJURY, Whey Gourmet, etc. Mix 1 scoop powder with 8oz skim/1% milk or light soymilk.  - Protein bars: Atkins, EAS, Pure Protein,  Quest, Think Thin, Detour, etc. Must have 0-4 grams sugar - Read the label.    ** Be CREATIVE. You can always snack on bites of grilled chicken or tuna salad made with low fat bravo, if needed!       "

## 2019-11-14 NOTE — PROGRESS NOTES
BARIATRIC FOLLOW UP:    Chief Complaint   Patient presents with    Follow-up       HISTORY OF PRESENT ILLNESS: Alivia Thomas is a 53 y.o. morbidly obese female with current BMI 51.76 kg/m2 and chronic pain on opioids, MDD, IDDM2, HTN, DLD, GERD and PADDY on CPAP, who presents with post-op follow-up s/p laparoscopic sleeve gastrectomy by Dr. Clements on 8/28/19.     Constipation is improving. BM every 2 days, hard. Miralax 1 cap daily. Smooth Move tea daily. SF liquids- ~64oz water daily.     She is otherwise doing well and denies other abd/epigastric pain, nausea, vomiting, dysphagia, or heartburn.   She is doing well with her protein intake.   Walks 1 mile daily.  Good vitamins    Review of Systems   Constitutional: NEgative for malaise/fatigue.   HENT: Negative for tinnitus.    Eyes: Negative for blurred vision and double vision.   Respiratory: Negative for cough and shortness of breath.    Cardiovascular: Negative for chest pain and leg swelling.   Gastrointestinal: Positive for constipation. Negative for diarrhea, abdominal pain, heartburn, nausea and vomiting.   Genitourinary: Negative for dysuria and frequency.   Musculoskeletal: Positive for back pain and knee pain Negative neck pain.   Skin: Negative for rash.   Neurological: Negative for dizziness, tingling and weakness.   Psychiatric/Behavioral: Negative for suicidal ideas. The patient is not nervous/anxious.     Vitals:    11/14/19 0932   BP: 128/75   Pulse: 76     Physical Exam   Constitutional: She is oriented to person, place, and time. She appears well-developed and well-nourished.   HENT:   Head: Normocephalic and atraumatic.   Neck: Normal range of motion.   Cardiovascular: Normal rate and regular rhythm.   Pulmonary/Chest: Effort normal. No respiratory distress.   Abdominal: Soft. Bowel sounds are normal. She exhibits no distension and no mass. There is no tenderness. There is no guarding.   Musculoskeletal: Normal range of motion.    Neurological: She is alert and oriented to person, place, and time. Coordination normal.   Skin: Skin is warm and dry. No rash noted. She is not diaphoretic. No erythema.   Psychiatric: She has a normal mood and affect. Her behavior is normal.   Vitals reviewed.    ASSESSMENT:  chronic pain on opioids, MDD, IDDM2 (improved), HTN, DLD, GERD (improved) and PADDY on CPAP, who presents with post-op follow-up s/p laparoscopic sleeve gastrectomy by Dr. Clements on 8/28/19.   Doing well after bariatric surgery. Reports significant reduction in medications. Off insulin and all DM agents.  50 pounds wt loss. 25% EWL.  Constipation, improving  Vitamins- good  Diet- ok. See RD note  Exercise- Ok    PLAN:  Increase Miralax to 2 doses daily. Increase fiber in diet. To discuss additionally with RD.  Continue regular exercise and adherence to bariatric diet to achieve maximum weight loss  Bariatric vitamin regimen.  Ursodiol 500mg daily x6 months post-op  Omeprazole, start taper  Avoid NSAIDS  RTC per post op schedule.  Call the office for any issues.  Check labs as scheduled.    30 minute visit, over 50% of time spent counseling patient face to face on diet, exercise, weight loss and bowel regimen.    Lori Gutierrez  10/2/19

## 2019-11-14 NOTE — PROGRESS NOTES
NUTRITION NOTE    Referring Physician: Heriberto Clements M.D.  Reason for MNT Referral: Follow-up 3 months s/p Gastric Bypass    PAST MEDICAL HISTORY:    Denies nausea, vomiting and diarrhea.  Reports doing well. C/o constipation and painful feet with gout.    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 female.    Excess Weight Loss: 25%    CURRENT DIET:  Bariatric Diet.  Diet Recall: 60-90 grams of protein/day; 48+ oz of fluids/day    B: 1 scrambled egg, 1 strip weiss  L: 1/4 hamburger tirso with cheese  D: small bowl red beans    Diet includes:  Meal Pattern: 4 meal(s) + 1-2 protein supplement(s)  Adequate protein supplement intake. Premier shakes.   Adequate dairy intake.  Inadequate vegetable intake. No raw vegetables and lettuce yet.  Inadequate fruit intake.  Starchy CHO: avoids    EXERCISE:  Walking around in stores   Once/week at gym walking on treadmill for 30 min    Restrictions to Exercise: Pain. gout.    VITAMINS / MINERALS:  See BA  Swallowing B12    ASSESSMENT:  Doing well overall.  Weight loss.  Adequate calorie intake.  Adequate protein intake.  Adequate fluid intake.  Following diet appropriately.  Exercising.  Adequate vitamins & minerals.    BARIATRIC DIET DISCUSSION:  Instructed and provided written materials on bariatric diet  plan.  Reinforced post-op nutrition guidelines.    PLAN / RECOMMENDATIONS:  May begin to incorporate raw vegetables, lettuce, unsalted nuts, and protein bars as tolerated.  May begin to swallow whole pills as tolerated.  Continue excellent diet plan.  - add a little fruit and cottage cheese every day  - add more vegetables daily - small amounts in addition to protein  Maintain protein intake.  Maintain or increase fluid intake.  Increase exercise.  Continue appropriate vitamins & minerals.  Adjust vitamins & minerals by taking B12 sublingual for best absorption.    Return to clinic in 3 months.    SESSION TIME: 15 minutes

## 2019-11-16 LAB — VIT B1 BLD-MCNC: 58 UG/L (ref 38–122)

## 2019-11-19 ENCOUNTER — PATIENT OUTREACH (OUTPATIENT)
Dept: ADMINISTRATIVE | Facility: OTHER | Age: 54
End: 2019-11-19

## 2019-11-19 ENCOUNTER — TELEPHONE (OUTPATIENT)
Dept: BARIATRICS | Facility: CLINIC | Age: 54
End: 2019-11-19

## 2019-11-19 DIAGNOSIS — E78.5 HYPERLIPIDEMIA, UNSPECIFIED HYPERLIPIDEMIA TYPE: Primary | ICD-10-CM

## 2019-11-19 NOTE — TELEPHONE ENCOUNTER
Notified pt cholesterol is elevated and to follow up with PCP. Verified PCP is Dr. Richardson. Will route results. Pt verbalized understanding.

## 2019-11-20 RX ORDER — ATORVASTATIN CALCIUM 20 MG/1
20 TABLET, FILM COATED ORAL DAILY
Qty: 90 TABLET | Refills: 3 | Status: SHIPPED | OUTPATIENT
Start: 2019-11-20 | End: 2020-11-04 | Stop reason: SDUPTHER

## 2019-11-20 NOTE — TELEPHONE ENCOUNTER
Please advise patient to restart atorvastatin 20 mg one daily - I will send a new script and please schedule lab in 6 weeks.

## 2019-11-21 NOTE — TELEPHONE ENCOUNTER
----- Message from Ronnie Viramontes sent at 11/21/2019 11:14 AM CST -----  Contact: Patient [phone]  RX request - refill or new RX.  Is this a refill or new RX:    RX name and strength: fluoxetine (PROZAC) 40 MG capsule   Directions:   Is this a 30 day or 90 day RX:    Pharmacy name and phone #[facility] - [address] [phone] (Phone) [phone] (Fax)    Comments: calling to check the update status. Stating If tablets would have to take 4 tablets to stomach down the Rx or the liquid solution.    Please call an advise  Thank you

## 2019-11-21 NOTE — TELEPHONE ENCOUNTER
----- Message from Imer Vidal sent at 11/20/2019  4:35 PM CST -----  Contact: Self [phone]  Patient would like an call back from the nurse in regards fluoxetine (PROZAC) 20 MG capsule (Discontinued), please advise.

## 2019-11-23 RX ORDER — FLUOXETINE HYDROCHLORIDE 40 MG/1
40 CAPSULE ORAL DAILY
Qty: 30 CAPSULE | Refills: 6 | Status: SHIPPED | OUTPATIENT
Start: 2019-11-23 | End: 2019-12-12

## 2019-11-25 ENCOUNTER — TELEPHONE (OUTPATIENT)
Dept: INTERNAL MEDICINE | Facility: CLINIC | Age: 54
End: 2019-11-25

## 2019-11-25 RX ORDER — FLUOXETINE 20 MG/5ML
40 SOLUTION ORAL DAILY
Qty: 300 ML | Refills: 6 | Status: SHIPPED | OUTPATIENT
Start: 2019-11-25 | End: 2020-11-05

## 2019-11-26 ENCOUNTER — TELEPHONE (OUTPATIENT)
Dept: ADMINISTRATIVE | Facility: HOSPITAL | Age: 54
End: 2019-11-26

## 2019-11-26 ENCOUNTER — PATIENT OUTREACH (OUTPATIENT)
Dept: ADMINISTRATIVE | Facility: HOSPITAL | Age: 54
End: 2019-11-26

## 2019-11-26 NOTE — TELEPHONE ENCOUNTER
Hello. I spoke to pt RE: Colon cancer screening. Pt does not want Fit, says she prefers to have colonoscopy done. Please place order if appropriate or discuss w/ pt at her upcoming f/up appt on 12/12/19. Thanks- Greta

## 2019-11-26 NOTE — PROGRESS NOTES
Outreach to pt completed RE: colon cancer screening. Pt does not want to do Fit Kit, says she would prefer colonoscopy. PCP advised. Chart review completed.

## 2019-11-27 ENCOUNTER — PATIENT OUTREACH (OUTPATIENT)
Dept: ADMINISTRATIVE | Facility: HOSPITAL | Age: 54
End: 2019-11-27

## 2019-12-12 ENCOUNTER — OFFICE VISIT (OUTPATIENT)
Dept: INTERNAL MEDICINE | Facility: CLINIC | Age: 54
End: 2019-12-12
Payer: MEDICARE

## 2019-12-12 VITALS
DIASTOLIC BLOOD PRESSURE: 80 MMHG | TEMPERATURE: 98 F | HEIGHT: 65 IN | WEIGHT: 286.63 LBS | SYSTOLIC BLOOD PRESSURE: 120 MMHG | OXYGEN SATURATION: 98 % | BODY MASS INDEX: 47.75 KG/M2 | HEART RATE: 66 BPM

## 2019-12-12 DIAGNOSIS — M25.561 CHRONIC PAIN OF BOTH KNEES: ICD-10-CM

## 2019-12-12 DIAGNOSIS — G89.29 BILATERAL CHRONIC KNEE PAIN: ICD-10-CM

## 2019-12-12 DIAGNOSIS — Z96.651 STATUS POST TOTAL RIGHT KNEE REPLACEMENT: ICD-10-CM

## 2019-12-12 DIAGNOSIS — G89.29 CHRONIC PAIN OF BOTH KNEES: ICD-10-CM

## 2019-12-12 DIAGNOSIS — M47.816 FACET ARTHRITIS OF LUMBAR REGION: ICD-10-CM

## 2019-12-12 DIAGNOSIS — Z12.11 SCREENING FOR COLON CANCER: Primary | ICD-10-CM

## 2019-12-12 DIAGNOSIS — J45.20 ASTHMA, WELL CONTROLLED, MILD INTERMITTENT: ICD-10-CM

## 2019-12-12 DIAGNOSIS — M25.562 BILATERAL CHRONIC KNEE PAIN: ICD-10-CM

## 2019-12-12 DIAGNOSIS — M17.0 PRIMARY OSTEOARTHRITIS OF BOTH KNEES: ICD-10-CM

## 2019-12-12 DIAGNOSIS — M25.562 CHRONIC PAIN OF BOTH KNEES: ICD-10-CM

## 2019-12-12 DIAGNOSIS — R73.9 HYPERGLYCEMIA: ICD-10-CM

## 2019-12-12 DIAGNOSIS — Z79.891 ENCOUNTER FOR LONG-TERM OPIATE ANALGESIC USE: Primary | ICD-10-CM

## 2019-12-12 DIAGNOSIS — G89.4 CHRONIC PAIN DISORDER: ICD-10-CM

## 2019-12-12 DIAGNOSIS — M51.36 DDD (DEGENERATIVE DISC DISEASE), LUMBAR: ICD-10-CM

## 2019-12-12 DIAGNOSIS — M47.816 SPONDYLOSIS OF LUMBAR REGION WITHOUT MYELOPATHY OR RADICULOPATHY: ICD-10-CM

## 2019-12-12 DIAGNOSIS — M25.561 BILATERAL CHRONIC KNEE PAIN: ICD-10-CM

## 2019-12-12 PROCEDURE — 99213 PR OFFICE/OUTPT VISIT, EST, LEVL III, 20-29 MIN: ICD-10-PCS | Mod: S$PBB,,, | Performed by: INTERNAL MEDICINE

## 2019-12-12 PROCEDURE — 99215 OFFICE O/P EST HI 40 MIN: CPT | Mod: PBBFAC | Performed by: INTERNAL MEDICINE

## 2019-12-12 PROCEDURE — 99999 PR PBB SHADOW E&M-EST. PATIENT-LVL V: ICD-10-PCS | Mod: PBBFAC,,, | Performed by: INTERNAL MEDICINE

## 2019-12-12 PROCEDURE — 99999 PR PBB SHADOW E&M-EST. PATIENT-LVL V: CPT | Mod: PBBFAC,,, | Performed by: INTERNAL MEDICINE

## 2019-12-12 PROCEDURE — 99213 OFFICE O/P EST LOW 20 MIN: CPT | Mod: S$PBB,,, | Performed by: INTERNAL MEDICINE

## 2019-12-12 RX ORDER — OXYCODONE AND ACETAMINOPHEN 10; 325 MG/1; MG/1
1 TABLET ORAL EVERY 8 HOURS PRN
Qty: 90 TABLET | Refills: 0 | Status: SHIPPED | OUTPATIENT
Start: 2019-12-13 | End: 2020-01-13 | Stop reason: SDUPTHER

## 2019-12-12 RX ORDER — ALLOPURINOL 300 MG/1
300 TABLET ORAL DAILY
Qty: 90 TABLET | Refills: 3 | Status: SHIPPED | OUTPATIENT
Start: 2019-12-12 | End: 2020-03-27 | Stop reason: SDUPTHER

## 2019-12-12 NOTE — TELEPHONE ENCOUNTER
Patient was last seen in the office on 10/16/19 by Virginia Sanchez. This is a  patient. Patient completed UDS 10/16/19 and was consistent. Patient has a pain contract on file. Patient last received this medication 10/16/19. Patient is scheduled for a follow up 01/29/20.

## 2019-12-13 ENCOUNTER — TELEPHONE (OUTPATIENT)
Dept: ORTHOPEDICS | Facility: CLINIC | Age: 54
End: 2019-12-13

## 2019-12-13 ENCOUNTER — TELEPHONE (OUTPATIENT)
Dept: PAIN MEDICINE | Facility: CLINIC | Age: 54
End: 2019-12-13

## 2019-12-13 NOTE — TELEPHONE ENCOUNTER
Staff contacted the patient to reschedule 01/29/19 10 am  appointment with Virginia Sanchez Np due to the provider being unavailable. At this time the appointment is cancelled and may be rescheduled by calling [phone]. Staff apologize for any inconvenience.     Patient stated she prefer the next available morning appointment.     Staff scheduled the patient for 1/30/19 11 am with Virginia Sanchez Np.     Patient wished staff happy holidays.

## 2019-12-13 NOTE — TELEPHONE ENCOUNTER
Spoke to pt and scheduled an appt with , I spoke with margarita to have her put in sooner. Pt is scheduled on 1/8/19 at 2:15pm, pt verbalized understanding and had no further concerns.

## 2019-12-17 ENCOUNTER — TELEPHONE (OUTPATIENT)
Dept: ENDOSCOPY | Facility: HOSPITAL | Age: 54
End: 2019-12-17

## 2019-12-17 NOTE — TELEPHONE ENCOUNTER
Contacted Pt to schedule colonoscopy, Pt is not ready to schedule at this time, Pt to call back to schedule, number provided [phone].

## 2019-12-18 ENCOUNTER — PATIENT OUTREACH (OUTPATIENT)
Dept: ADMINISTRATIVE | Facility: OTHER | Age: 54
End: 2019-12-18

## 2019-12-19 ENCOUNTER — HOSPITAL ENCOUNTER (OUTPATIENT)
Facility: OTHER | Age: 54
Discharge: HOME | End: 2019-12-19
Attending: ANESTHESIOLOGY | Admitting: ANESTHESIOLOGY
Payer: MEDICARE

## 2019-12-19 ENCOUNTER — TELEPHONE (OUTPATIENT)
Dept: OBSTETRICS AND GYNECOLOGY | Facility: CLINIC | Age: 54
End: 2019-12-19

## 2019-12-19 VITALS
SYSTOLIC BLOOD PRESSURE: 136 MMHG | RESPIRATION RATE: 18 BRPM | DIASTOLIC BLOOD PRESSURE: 73 MMHG | OXYGEN SATURATION: 96 % | HEART RATE: 65 BPM | TEMPERATURE: 98 F

## 2019-12-19 DIAGNOSIS — G89.29 CHRONIC PAIN: ICD-10-CM

## 2019-12-19 DIAGNOSIS — M47.816 OSTEOARTHRITIS OF LUMBAR SPINE, UNSPECIFIED SPINAL OSTEOARTHRITIS COMPLICATION STATUS: Primary | ICD-10-CM

## 2019-12-19 DIAGNOSIS — M47.816 LUMBAR SPONDYLOSIS: ICD-10-CM

## 2019-12-19 LAB — POCT GLUCOSE: 106 MG/DL (ref 70–110)

## 2019-12-19 PROCEDURE — 64636 PR DESTROY L/S FACET JNT ADDL: ICD-10-PCS | Mod: LT,,, | Performed by: ANESTHESIOLOGY

## 2019-12-19 PROCEDURE — 64635 PR DESTROY LUMB/SAC FACET JNT: ICD-10-PCS | Mod: LT,,, | Performed by: ANESTHESIOLOGY

## 2019-12-19 PROCEDURE — 99152 PR MOD CONSCIOUS SEDATION, SAME PHYS, 5+ YRS, FIRST 15 MIN: ICD-10-PCS | Mod: ,,, | Performed by: ANESTHESIOLOGY

## 2019-12-19 PROCEDURE — 63600175 PHARM REV CODE 636 W HCPCS: Performed by: STUDENT IN AN ORGANIZED HEALTH CARE EDUCATION/TRAINING PROGRAM

## 2019-12-19 PROCEDURE — 25000003 PHARM REV CODE 250: Performed by: ANESTHESIOLOGY

## 2019-12-19 PROCEDURE — 63600175 PHARM REV CODE 636 W HCPCS: Performed by: ANESTHESIOLOGY

## 2019-12-19 PROCEDURE — 99152 MOD SED SAME PHYS/QHP 5/>YRS: CPT | Mod: ,,, | Performed by: ANESTHESIOLOGY

## 2019-12-19 PROCEDURE — 64635 DESTROY LUMB/SAC FACET JNT: CPT | Performed by: ANESTHESIOLOGY

## 2019-12-19 PROCEDURE — 64636 DESTROY L/S FACET JNT ADDL: CPT | Mod: LT,,, | Performed by: ANESTHESIOLOGY

## 2019-12-19 PROCEDURE — 64635 DESTROY LUMB/SAC FACET JNT: CPT | Mod: LT,,, | Performed by: ANESTHESIOLOGY

## 2019-12-19 PROCEDURE — 64636 DESTROY L/S FACET JNT ADDL: CPT | Performed by: ANESTHESIOLOGY

## 2019-12-19 PROCEDURE — 82947 ASSAY GLUCOSE BLOOD QUANT: CPT | Performed by: ANESTHESIOLOGY

## 2019-12-19 RX ORDER — FENTANYL CITRATE 50 UG/ML
INJECTION, SOLUTION INTRAMUSCULAR; INTRAVENOUS
Status: DISCONTINUED | OUTPATIENT
Start: 2019-12-19 | End: 2019-12-19 | Stop reason: HOSPADM

## 2019-12-19 RX ORDER — LIDOCAINE HYDROCHLORIDE 10 MG/ML
INJECTION INFILTRATION; PERINEURAL
Status: DISCONTINUED | OUTPATIENT
Start: 2019-12-19 | End: 2019-12-19 | Stop reason: HOSPADM

## 2019-12-19 RX ORDER — BUPIVACAINE HYDROCHLORIDE 2.5 MG/ML
INJECTION, SOLUTION EPIDURAL; INFILTRATION; INTRACAUDAL
Status: DISCONTINUED | OUTPATIENT
Start: 2019-12-19 | End: 2019-12-19 | Stop reason: HOSPADM

## 2019-12-19 RX ORDER — MIDAZOLAM HYDROCHLORIDE 1 MG/ML
INJECTION INTRAMUSCULAR; INTRAVENOUS
Status: DISCONTINUED | OUTPATIENT
Start: 2019-12-19 | End: 2019-12-19 | Stop reason: HOSPADM

## 2019-12-19 RX ORDER — SODIUM CHLORIDE 9 MG/ML
500 INJECTION, SOLUTION INTRAVENOUS CONTINUOUS
Status: DISCONTINUED | OUTPATIENT
Start: 2019-12-19 | End: 2019-12-19 | Stop reason: HOSPADM

## 2019-12-19 RX ADMIN — SODIUM CHLORIDE 500 ML: 0.9 INJECTION, SOLUTION INTRAVENOUS at 08:12

## 2019-12-19 NOTE — DISCHARGE SUMMARY
Discharge Note  Short Stay      SUMMARY     Admit Date: 12/19/2019    Attending Physician: Chung Moreno      Discharge Physician: Chung Moreno      Discharge Date: 12/19/2019 8:56 AM    Procedure(s) (LRB):  RADIOFREQUENCY ABLATION, LEFT L2-L3-L4-L5 MEDIAL BRANCH 1 OF 2 (Left)    Final Diagnosis: Lumbar spondylosis [M47.816]    Disposition: Home or self care    Patient Instructions:   Current Discharge Medication List      CONTINUE these medications which have NOT CHANGED    Details   allopurinol (ZYLOPRIM) 300 MG tablet Take 1 tablet (300 mg total) by mouth once daily.  Qty: 90 tablet, Refills: 3      atorvastatin (LIPITOR) 20 MG tablet Take 1 tablet (20 mg total) by mouth once daily.  Qty: 90 tablet, Refills: 3      b complex vitamins tablet Take 1 tablet by mouth every other day.       calcium citrate/vitamin D2 (ISIAH-CITRATE ORAL) Take 500 mg by mouth 2 (two) times daily.      cyanocobalamin (VITAMIN B-12) 1000 MCG tablet Take 100 mcg by mouth every morning.      FLUoxetine (PROZAC) 20 mg/5 mL (4 mg/mL) solution Take 10 mLs (40 mg total) by mouth once daily.  Qty: 300 mL, Refills: 6      fluticasone (FLONASE) 50 mcg/actuation nasal spray 1 spray by Each Nare route 2 (two) times daily as needed for Rhinitis.  Qty: 15 g, Refills: 0      MULTIVIT-IRON-MIN-FOLIC ACID 3,500-18-0.4 UNIT-MG-MG ORAL CHEW Take 1 tablet by mouth 2 (two) times daily.       omeprazole (PRILOSEC) 20 MG capsule Take 1 capsule (20 mg total) by mouth every morning.  Qty: 90 capsule, Refills: 3    Associated Diagnoses: Gastroesophageal reflux disease without esophagitis      ondansetron (ZOFRAN) 4 MG tablet Take 1 tablet (4 mg total) by mouth every 6 (six) hours as needed.  Qty: 20 tablet, Refills: 0      oxyCODONE-acetaminophen (PERCOCET)  mg per tablet Take 1 tablet by mouth every 8 (eight) hours as needed for Pain. Greater than 7 day quantity medically necessary  Qty: 90 tablet, Refills: 0    Comments: Quantity prescribed more  than 7 day supply? Yes, quantity medically necessary  Associated Diagnoses: Spondylosis of lumbar region without myelopathy or radiculopathy; Facet arthritis of lumbar region; DDD (degenerative disc disease), lumbar; Primary osteoarthritis of both knees; Bilateral chronic knee pain; Status post total right knee replacement; Chronic pain disorder; Encounter for long-term opiate analgesic use      VENTOLIN HFA 90 mcg/actuation inhaler INHALE TWO PUFFS INTO LUNGS EVERY 4 TO 6 HOURS AS NEEDED FOR SHORTNESS OF BREATH AND FOR WHEEZING  Qty: 18 each, Refills: 0    Comments: Please consider 90 day supplies to promote better adherence  Associated Diagnoses: Asthma, well controlled, mild intermittent      vitamin D 185 MG Tab Take 5,000 mg by mouth every morning.                  Discharge Diagnosis: Lumbar spondylosis [M47.816]  Condition on Discharge: Stable with no complications to procedure   Diet on Discharge: Same as before.  Activity: as per instruction sheet.  Discharge to: Home with a responsible adult.  Follow up: 2-4 weeks       Please call my office or pager at [phone] if experienced any weakness or loss of sensation, fever > 101.5, pain uncontrolled with oral medications, persistent nausea/vomiting/or diarrhea, redness or drainage from the incisions, or any other worrisome concerns. If physician on call was not reached or could not communicate with our office for any reason please go to the nearest emergency department

## 2019-12-19 NOTE — H&P
HPI  Patient presenting for Procedure(s) (LRB):  RADIOFREQUENCY ABLATION, LEFT L2-L3-L4-L5 MEDIAL BRANCH 1 OF 2 (Left)     Patient on Anti-coagulation No    No health changes since previous encounter    Past Medical History:   Diagnosis Date    Allergy     Anemia     Asthma     Bilateral shoulder bursitis     Cervical stenosis of spine     Chronic pain     DDD (degenerative disc disease), cervical     Depression 1/28/2019    Diabetes mellitus type II     DJD (degenerative joint disease) of knee 6/19/2014    Facet arthritis of lumbar region 12/17/2015    Facet syndrome 12/17/2015    GERD (gastroesophageal reflux disease)     Heartburn     Hyperlipidemia     Hypertension     Morbid obesity     Neuromuscular disorder     PADDY (obstructive sleep apnea)     Proteinuria     Right carpal tunnel syndrome     Sacroiliitis 6/13/2018    Sacroiliitis     Sleep apnea      Past Surgical History:   Procedure Laterality Date    CARPAL TUNNEL RELEASE      CARPAL TUNNEL RELEASE  1980s    left    CARPAL TUNNEL RELEASE  2012    right    ESOPHAGOGASTRODUODENOSCOPY N/A 3/27/2019    Procedure: EGD (ESOPHAGOGASTRODUODENOSCOPY);  Surgeon: Robbin Bar MD;  Location: [address]);  Service: Endoscopy;  Laterality: N/A;  BMI 61.3/2nd floor case/svn    JOINT REPLACEMENT Bilateral     with 2 revisions on rt    KNEE SURGERY  3/2010    orthroscope    KNEE SURGERY  6-19-14    left TKR    LAPAROSCOPIC SLEEVE GASTRECTOMY N/A 8/28/2019    Procedure: GASTRECTOMY, SLEEVE, LAPAROSCOPIC with intraop EGD;  Surgeon: Heriberto Clements MD;  Location: [facility] OR [address] [place] [address];  Service: General;  Laterality: N/A;    RADIOFREQUENCY ABLATION Right 7/9/2019    Procedure: RADIOFREQUENCY ABLATION;  Surgeon: Lina Wagner MD;  Location: [facility] PAIN MGT;  Service: Pain Management;  Laterality: Right;  RIGHT RFA L2,3,4,5  2 of 2    RADIOFREQUENCY ABLATION OF LUMBAR MEDIAL BRANCH NERVE AT SINGLE LEVEL Left 6/26/2018     Procedure: RADIOFREQUENCY ABLATION, NERVE, MEDIAL BRANCH, LUMBAR, 1 LEVEL;  Surgeon: Lina Wagner MD;  Location: [facility] PAIN MGT;  Service: Pain Management;  Laterality: Left;  Left Cooled RFA @ L2,3,4,5  [id number]  with Sedation    1 of 2    RADIOFREQUENCY ABLATION OF LUMBAR MEDIAL BRANCH NERVE AT SINGLE LEVEL Right 7/10/2018    Procedure: RADIOFREQUENCY ABLATION, NERVE, MEDIAL BRANCH, LUMBAR, 1 LEVEL;  Surgeon: Lina Wagner MD;  Location: [facility] PAIN MGT;  Service: Pain Management;  Laterality: Right;  RIght Cooled RFA @ L2,3,4,5  [id number] with Sedation    2 of 2    TRIGGER FINGER RELEASE Right 2017    TRIGGER FINGER RELEASE Left 7/29/2019    Procedure: RELEASE, TRIGGER FINGER, Left Thumb;  Surgeon: Velma Garcia MD;  Location: [facility] OR;  Service: Orthopedics;  Laterality: Left;  Local w/ MAC     Review of patient's allergies indicates:   Allergen Reactions    Strawberry Anaphylaxis      No current facility-administered medications for this encounter.        PMHx, PSHx, Allergies, Medications reviewed in epic    ROS negative except pain complaints in HPI    OBJECTIVE:    LMP 06/26/2018     PHYSICAL EXAMINATION:    GENERAL: Well appearing, in no acute distress, alert and oriented x3.  PSYCH:  Mood and affect appropriate.  SKIN: Skin color, texture, turgor normal, no rashes or lesions which will impact the procedure.  CV: RRR with palpation of the radial artery.  PULM: No evidence of respiratory difficulty, symmetric chest rise. Clear to auscultation.  NEURO: Cranial nerves grossly intact.    Plan:    Proceed with procedure as planned Procedure(s) (LRB):  RADIOFREQUENCY ABLATION, LEFT L2-L3-L4-L5 MEDIAL BRANCH 1 OF 2 (Left)    Chung Moreno MD  12/19/2019

## 2019-12-19 NOTE — H&P (VIEW-ONLY)
HPI  Patient presenting for Procedure(s) (LRB):  RADIOFREQUENCY ABLATION, LEFT L2-L3-L4-L5 MEDIAL BRANCH 1 OF 2 (Left)     Patient on Anti-coagulation No    No health changes since previous encounter    Past Medical History:   Diagnosis Date    Allergy     Anemia     Asthma     Bilateral shoulder bursitis     Cervical stenosis of spine     Chronic pain     DDD (degenerative disc disease), cervical     Depression 1/28/2019    Diabetes mellitus type II     DJD (degenerative joint disease) of knee 6/19/2014    Facet arthritis of lumbar region 12/17/2015    Facet syndrome 12/17/2015    GERD (gastroesophageal reflux disease)     Heartburn     Hyperlipidemia     Hypertension     Morbid obesity     Neuromuscular disorder     PADDY (obstructive sleep apnea)     Proteinuria     Right carpal tunnel syndrome     Sacroiliitis 6/13/2018    Sacroiliitis     Sleep apnea      Past Surgical History:   Procedure Laterality Date    CARPAL TUNNEL RELEASE      CARPAL TUNNEL RELEASE  1980s    left    CARPAL TUNNEL RELEASE  2012    right    ESOPHAGOGASTRODUODENOSCOPY N/A 3/27/2019    Procedure: EGD (ESOPHAGOGASTRODUODENOSCOPY);  Surgeon: Robbin Bar MD;  Location: [address]);  Service: Endoscopy;  Laterality: N/A;  BMI 61.3/2nd floor case/svn    JOINT REPLACEMENT Bilateral     with 2 revisions on rt    KNEE SURGERY  3/2010    orthroscope    KNEE SURGERY  6-19-14    left TKR    LAPAROSCOPIC SLEEVE GASTRECTOMY N/A 8/28/2019    Procedure: GASTRECTOMY, SLEEVE, LAPAROSCOPIC with intraop EGD;  Surgeon: Heriberto Clements MD;  Location: [facility] OR [address] [place] [address];  Service: General;  Laterality: N/A;    RADIOFREQUENCY ABLATION Right 7/9/2019    Procedure: RADIOFREQUENCY ABLATION;  Surgeon: Lina Wagner MD;  Location: [facility] PAIN MGT;  Service: Pain Management;  Laterality: Right;  RIGHT RFA L2,3,4,5  2 of 2    RADIOFREQUENCY ABLATION OF LUMBAR MEDIAL BRANCH NERVE AT SINGLE LEVEL Left 6/26/2018     Procedure: RADIOFREQUENCY ABLATION, NERVE, MEDIAL BRANCH, LUMBAR, 1 LEVEL;  Surgeon: Lina Wagner MD;  Location: [facility] [place] PAIN MGT;  Service: Pain Management;  Laterality: Left;  Left Cooled RFA @ L2,3,4,5  [id number]  with Sedation    1 of 2    RADIOFREQUENCY ABLATION OF LUMBAR MEDIAL BRANCH NERVE AT SINGLE LEVEL Right 7/10/2018    Procedure: RADIOFREQUENCY ABLATION, NERVE, MEDIAL BRANCH, LUMBAR, 1 LEVEL;  Surgeon: Lina Wagner MD;  Location: [facility] [place] PAIN MGT;  Service: Pain Management;  Laterality: Right;  RIght Cooled RFA @ L2,3,4,5  [id number] with Sedation    2 of 2    TRIGGER FINGER RELEASE Right 2017    TRIGGER FINGER RELEASE Left 7/29/2019    Procedure: RELEASE, TRIGGER FINGER, Left Thumb;  Surgeon: Velma Garcia MD;  Location: [facility] [place] OR;  Service: Orthopedics;  Laterality: Left;  Local w/ MAC     Review of patient's allergies indicates:   Allergen Reactions    Strawberry Anaphylaxis      No current facility-administered medications for this encounter.        PMHx, PSHx, Allergies, Medications reviewed in epic    ROS negative except pain complaints in HPI    OBJECTIVE:    LMP 06/26/2018     PHYSICAL EXAMINATION:    GENERAL: Well appearing, in no acute distress, alert and oriented x3.  PSYCH:  Mood and affect appropriate.  SKIN: Skin color, texture, turgor normal, no rashes or lesions which will impact the procedure.  CV: RRR with palpation of the radial artery.  PULM: No evidence of respiratory difficulty, symmetric chest rise. Clear to auscultation.  NEURO: Cranial nerves grossly intact.    Plan:    Proceed with procedure as planned Procedure(s) (LRB):  RADIOFREQUENCY ABLATION, LEFT L2-L3-L4-L5 MEDIAL BRANCH 1 OF 2 (Left)    Chung Moreno MD  12/19/2019

## 2019-12-19 NOTE — TELEPHONE ENCOUNTER
----- Message from Tamiko Stein sent at 12/19/2019 11:52 AM CST -----  Contact: Self      Name of Who is Calling: DONTAE MOON [[id number]]      What is the request in detail: Pt would like to cancel appt on today.Please contact to further discuss and advise.        Can the clinic reply by [facility]: Y      What [facility] [phone]

## 2019-12-19 NOTE — DISCHARGE INSTRUCTIONS

## 2019-12-19 NOTE — OP NOTE
Lumbar Medial nerve branch block radiofrequency ablation Under Fluoroscopy     Time-out taken to identify patient and procedure side prior to starting the procedure.     12/19/2019    PROCEDURE: Left radiofrequency ablation of the the medial branch nerves at the   transverse process of  L2, L3, L4, L5 and sacral ala    2)Conscious sedation provided by MD     REASON FOR PROCEDURE: Lumbar spondylosis [M47.816]     PHYSICIAN: Lina Wagner MD     ASSISTANTS: Chung Moreno MD    MEDICATIONS INJECTED: 0.25% Bupivicaine total 8mL     LOCAL ANESTHETIC USED: Xylocaine 1% 1mL / site     ESTIMATED BLOOD LOSS: None.     COMPLICATIONS: None.     Interval history: Patient reports that he had complete relief of pain for the day of the procedure, we will proceed with the RFA     TECHNIQUE: Laying in a prone position, the patient was prepped and draped in the usual sterile fashion using ChloraPrep and fenestrated drape. The level was determined under fluoroscopic guidance. Local anesthetic was given by going down to the hub of the 27-gauge 1.25in needle and raising a wheel. A 18-gauge 10mm curved active tip needle was introduced to the anatomic local of the medial branch at each of the above levels using fluoroscopy. Then sensory and motor testing was performed to confirm that the needle tips were in the correct location. Then after negative aspiration, 1 mL of 0.25% bupivacaine was injected into each level. This was followed by thermal lesioning at 80 degrees celsius for 90 seconds.     Conscious sedation provided by M.D   The patient was monitored with continuous pulse oximetry, EKG, and intermittent blood pressure monitors. The patient was hemodynamically stable throughout the entire process was responsive to voice, and breathing spontaneously. Supplemental O2 was provided at 2L/min via nasal cannula. Patient was comfortable for the duration of the procedure. (See nurse documentation and case log for sedation  time)    There was a total of 2mg IV Midazolam and 50mcg Fentanyl titrated for the procedure    The patient tolerated the procedure well. Did not have any procedure related motor deficit at the conclusion of the procedure    The patient was monitored after the procedure. Patient was given post procedure and discharge instructions to follow at home. We will see the patient back in two weeks or the patient may call to inform of status. The patient was discharged in a stable condition

## 2019-12-19 NOTE — TELEPHONE ENCOUNTER
Pt was calling to schedule cancel her appt for today she had a procedure. Pt will call the office back to reschedule.

## 2019-12-22 ENCOUNTER — TELEPHONE (OUTPATIENT)
Dept: INTERNAL MEDICINE | Facility: CLINIC | Age: 54
End: 2019-12-22

## 2019-12-22 NOTE — PROGRESS NOTES
Subjective:       Patient ID: Alivia Thomas is a 54 y.o. female.    Chief Complaint: Follow-up    HPIPt doing better - continues to lose weight.  Gout is controlled.  No CP or SOB.  Some constipation and knee pain.  Review of Systems   Respiratory: Negative for shortness of breath (PND or orthopnea).    Cardiovascular: Negative for chest pain (arm pain or jaw pain).   Gastrointestinal: Negative for abdominal pain, diarrhea, nausea and vomiting.   Genitourinary: Negative for dysuria.   Neurological: Negative for seizures, syncope and headaches.       Objective:      Physical Exam   Constitutional: She is oriented to person, place, and time. She appears well-developed and well-nourished. No distress.   HENT:   Head: Normocephalic.   Mouth/Throat: Oropharynx is clear and moist.   Neck: Neck supple. No JVD present. No thyromegaly present.   Cardiovascular: Normal rate, regular rhythm, normal heart sounds and intact distal pulses. Exam reveals no gallop and no friction rub.   No murmur heard.  Pulmonary/Chest: Effort normal and breath sounds normal. She has no wheezes. She has no rales.   Abdominal: Soft. Bowel sounds are normal. She exhibits no distension and no mass. There is no tenderness. There is no rebound and no guarding.   Musculoskeletal: She exhibits no edema.   Lymphadenopathy:     She has no cervical adenopathy.   Neurological: She is alert and oriented to person, place, and time. She has normal reflexes.   Skin: Skin is warm and dry.   Psychiatric: She has a normal mood and affect. Her behavior is normal. Judgment and thought content normal.       Assessment:       1. Screening for colon cancer    2. Hyperglycemia    3. Chronic pain of both knees        Plan:   Screening for colon cancer  -     Case request GI: COLONOSCOPY    Hyperglycemia  -     Hemoglobin A1c; Future; Expected date: 12/12/2019    Chronic pain of both knees  -     Ambulatory referral/consult to Orthopedics; Future; Expected date:  12/12/2019    Other orders  -     allopurinol (ZYLOPRIM) 300 MG tablet; Take 1 tablet (300 mg total) by mouth once daily.  Dispense: 90 tablet; Refill: 3

## 2019-12-26 ENCOUNTER — PATIENT OUTREACH (OUTPATIENT)
Dept: ADMINISTRATIVE | Facility: OTHER | Age: 54
End: 2019-12-26

## 2019-12-30 ENCOUNTER — OFFICE VISIT (OUTPATIENT)
Dept: PODIATRY | Facility: CLINIC | Age: 54
End: 2019-12-30
Payer: MEDICARE

## 2019-12-30 VITALS — WEIGHT: 286 LBS | HEIGHT: 65 IN | BODY MASS INDEX: 47.65 KG/M2

## 2019-12-30 DIAGNOSIS — B35.1 DERMATOPHYTOSIS OF NAIL: ICD-10-CM

## 2019-12-30 DIAGNOSIS — E11.49 TYPE II DIABETES MELLITUS WITH NEUROLOGICAL MANIFESTATIONS: Primary | ICD-10-CM

## 2019-12-30 DIAGNOSIS — L84 CORNS AND CALLUS: ICD-10-CM

## 2019-12-30 DIAGNOSIS — M25.561 PAIN IN BOTH KNEES, UNSPECIFIED CHRONICITY: Primary | ICD-10-CM

## 2019-12-30 DIAGNOSIS — M25.562 PAIN IN BOTH KNEES, UNSPECIFIED CHRONICITY: Primary | ICD-10-CM

## 2019-12-30 PROCEDURE — 99499 UNLISTED E&M SERVICE: CPT | Mod: S$PBB,,, | Performed by: PODIATRIST

## 2019-12-30 PROCEDURE — 99213 OFFICE O/P EST LOW 20 MIN: CPT | Mod: PBBFAC,PN,25 | Performed by: PODIATRIST

## 2019-12-30 PROCEDURE — 11056 PARNG/CUTG B9 HYPRKR LES 2-4: CPT | Performed by: PODIATRIST

## 2019-12-30 PROCEDURE — 99499 NO LOS: ICD-10-PCS | Mod: S$PBB,,, | Performed by: PODIATRIST

## 2019-12-30 PROCEDURE — 99999 PR PBB SHADOW E&M-EST. PATIENT-LVL III: CPT | Mod: PBBFAC,,, | Performed by: PODIATRIST

## 2019-12-30 PROCEDURE — 11721 DEBRIDE NAIL 6 OR MORE: CPT | Mod: 59,Q9,S$PBB, | Performed by: PODIATRIST

## 2019-12-30 PROCEDURE — 99999 PR PBB SHADOW E&M-EST. PATIENT-LVL III: ICD-10-PCS | Mod: PBBFAC,,, | Performed by: PODIATRIST

## 2019-12-30 PROCEDURE — 11056 ROUTINE FOOT CARE: ICD-10-PCS | Mod: Q9,S$PBB,, | Performed by: PODIATRIST

## 2019-12-30 PROCEDURE — 11721 ROUTINE FOOT CARE: ICD-10-PCS | Mod: 59,Q9,S$PBB, | Performed by: PODIATRIST

## 2019-12-30 NOTE — PATIENT INSTRUCTIONS
How to Check Your Feet    Below are tips to help you look for foot problems. Try to check your feet at the same time each day, such as when you get out of bed in the morning.    · Check the top of each foot. The tops of toes, back of the heel, and outer edge of the foot can get a lot of rubbing from poor-fitting shoes.    · Check the bottom of each foot. Daily wear and tear often leads to problems at pressure spots.    · Check the toes and nails. Fungal infections often occur between toes. Toenail problems can also be a sign of fungal infections or lead to breaks in the skin.    · Check your shoes, too. Loose objects inside a shoe can injure the foot. Use your hand to feel inside your shoes for things like alie, loose stitching, or rough areas that could irritate your skin.        Diabetic Foot Care    Diabetes can lead to a number of different foot complications. Fortunately, most of these complications can be prevented with a little extra foot care. If diabetes is not well controlled, the high blood sugar can cause damage to blood vessels and result in poor circulation to the foot. When the skin does not get enough blood flow, it becomes prone to pressure sores and ulcers, which heal slowly.  High blood sugar can also damage nerves, interfering with the ability to feel pain and pressure. When you cant feel your foot normally, it is easy to injure your skin, bones and joints without knowing it. For these reasons diabetes increases the risk of fungal infections, bunions and ulcers. Deep ulcers can lead to bone infection. Gangrene is the most serious foot complication of diabetes. It usually occurs on the tips of the toes as blacked areas of skin. The black area is dead tissue. In severe cases, gangrene spreads to involve the entire toe, other toes and the entire foot. Foot or toe amputation may be required. Good foot care and blood sugar control can prevent this.    Home Care  1. Wear comfortable, proper fitting  shoes.  2. Wash your feet daily with warm water and mild soap.  3. After drying, apply a moisturizing cream or lotion.  4. Check your feet daily for skin breaks, blisters, swelling, or redness. Look between your toes also.  5. Wear cotton socks and change them every day.  6. Trim toe nails carefully and do not cut your cuticles.  7. Strive to keep your blood sugar under control with a combination of medicines, diet and activity.  8. If you smoke and have diabetes, it is very important that you stop. Smoking reduces blood flow to your foot.  9. Avoid activities that increase your risk of foot injury:  · Do not walk barefoot.  · Do not use heating pads or hot water bottles on your feet.  · Do not put your foot in a hot tub without first checking the temperature with your hand.  10) Schedule yearly foot exams.    Follow Up  with your doctor or as advised by our staff. Report any cut, puncture, scrape, other injury, blister, ingrown toenail or ulcer on your foot.    Get Prompt Medical Attention  if any of the following occur:  -- Open ulcer with pus draining from the wound  -- Increasing foot or leg pain  -- New areas of redness or swelling or tender areas of the foot    © 5195-3491 [employer]. [address] [place] [address], [place] [address]. All rights reserved. This information is not intended as a substitute for professional medical care. Always follow your healthcare professional's instructions.      General nail care measures for abnormal nails include:    ? Keeping nails trimmed short    ? Avoiding trauma     ? Avoiding contact irritants     ? Keeping nails dry (avoiding wet work)    ? Avoiding all nail cosmetics

## 2019-12-30 NOTE — PROGRESS NOTES
"Chief Complaint   Patient presents with    Diabetes Mellitus     PCP: Clarisse Richardson 12/12/2019 / A1C: 09/20/2019 / 6.4    Nail Problem           HPI:   The patient is a 54 y.o.  female  who presents for a diabetic foot exam.   Patient reports + presence of abnormal sensation to the feet, just sometimes, mostly at night.   Patient has no history of wounds on the feet.   Hx of foot surgery: none.   Shoe gear: casual shoes   This patient has documented high risk feet requiring routine maintenance secondary to diabetes.    Main concern today is need for toenail trimming. Routine trimming helps.  Foot exam due 5/2020            Primary care doctor is: Clarisse Richardson MD  Patient last saw primary care doctor on:    Chief Complaint   Patient presents with    Diabetes Mellitus     PCP: Clarisse Richardson 12/12/2019 / A1C: 09/20/2019 / 6.4    Nail Problem           Vitals:    12/30/19 1138   Weight: 129.7 kg (286 lb)   Height: 5' 5" (1.651 m)   PainSc: 0-No pain             Past Medical History:   Diagnosis Date    Allergy     Anemia     Asthma     Bilateral shoulder bursitis     Cervical stenosis of spine     Chronic pain     DDD (degenerative disc disease), cervical     Depression 1/28/2019    Diabetes mellitus type II     DJD (degenerative joint disease) of knee 6/19/2014    Facet arthritis of lumbar region 12/17/2015    Facet syndrome 12/17/2015    GERD (gastroesophageal reflux disease)     Heartburn     Hyperlipidemia     Hypertension     Morbid obesity     Neuromuscular disorder     PADDY (obstructive sleep apnea)     Proteinuria     Right carpal tunnel syndrome     Sacroiliitis 6/13/2018    Sacroiliitis     Sleep apnea          Current Outpatient Medications on File Prior to Visit   Medication Sig Dispense Refill    allopurinol (ZYLOPRIM) 300 MG tablet Take 1 tablet (300 mg total) by mouth once daily. 90 tablet 3    atorvastatin (LIPITOR) 20 MG tablet Take 1 tablet (20 mg total) by " mouth once daily. 90 tablet 3    b complex vitamins tablet Take 1 tablet by mouth every other day.       calcium citrate/vitamin D2 (ISIAH-CITRATE ORAL) Take 500 mg by mouth 2 (two) times daily.      cyanocobalamin (VITAMIN B-12) 1000 MCG tablet Take 100 mcg by mouth every morning.      FLUoxetine (PROZAC) 20 mg/5 mL (4 mg/mL) solution Take 10 mLs (40 mg total) by mouth once daily. 300 mL 6    fluticasone (FLONASE) 50 mcg/actuation nasal spray 1 spray by Each Nare route 2 (two) times daily as needed for Rhinitis. 15 g 0    MULTIVIT-IRON-MIN-FOLIC ACID 3,500-18-0.4 UNIT-MG-MG ORAL CHEW Take 1 tablet by mouth 2 (two) times daily.       omeprazole (PRILOSEC) 20 MG capsule Take 1 capsule (20 mg total) by mouth every morning. 90 capsule 3    ondansetron (ZOFRAN) 4 MG tablet Take 1 tablet (4 mg total) by mouth every 6 (six) hours as needed. 20 tablet 0    oxyCODONE-acetaminophen (PERCOCET)  mg per tablet Take 1 tablet by mouth every 8 (eight) hours as needed for Pain. Greater than 7 day quantity medically necessary 90 tablet 0    VENTOLIN HFA 90 mcg/actuation inhaler INHALE TWO PUFFS INTO LUNGS EVERY 4 TO 6 HOURS AS NEEDED FOR SHORTNESS OF BREATH AND FOR WHEEZING 18 each 0    vitamin D 185 MG Tab Take 5,000 mg by mouth every morning.        No current facility-administered medications on file prior to visit.        Review of patient's allergies indicates:  No Known Allergies          Social History     Socioeconomic History    Marital status:      Spouse name: Not on file    Number of children: Not on file    Years of education: Not on file    Highest education level: Not on file   Occupational History    Not on file   Social Needs    Financial resource strain: Not on file    Food insecurity:     Worry: Not on file     Inability: Not on file    Transportation needs:     Medical: Not on file     Non-medical: Not on file   Tobacco Use    Smoking status: Never Smoker    Smokeless tobacco:  Never Used   Substance and Sexual Activity    Alcohol use: Not Currently     Comment: occasionally     Drug use: No    Sexual activity: Yes     Partners: Female     Birth control/protection: None   Lifestyle    Physical activity:     Days per week: Not on file     Minutes per session: Not on file    Stress: Not on file   Relationships    Social connections:     Talks on phone: Not on file     Gets together: Not on file     Attends [employer] service: Not on file     Active member of club or organization: Not on file     Attends meetings of clubs or organizations: Not on file     Relationship status: Not on file   Other Topics Concern    Not on file   Social History Narrative    Disabled. The patient is the youngest of 6 siblings. Single. Lives with single-sex partner.                        ROS:  General ROS: negative  Respiratory ROS: no cough, shortness of breath, or wheezing  Cardiovascular ROS: no chest pain or dyspnea on exertion  Musculoskeletal ROS: negative  Neurological ROS:   negative for - gait disturbance, + numbness/tingling, seizures or tremors  Dermatological ROS: + nail changes, dry skin          LAST HbA1c:   Hemoglobin A1C   Date Value Ref Range Status   09/20/2019 6.4 (H) 4.0 - 5.6 % Final     Comment:     ADA Screening Guidelines:  5.7-6.4%  Consistent with prediabetes  >or=6.5%  Consistent with diabetes  High levels of fetal hemoglobin interfere with the HbA1C  assay. Heterozygous hemoglobin variants (HbS, HgC, etc)do  not significantly interfere with this assay.   However, presence of multiple variants may affect accuracy.     08/12/2019 6.9 (H) 4.0 - 5.6 % Final     Comment:     ADA Screening Guidelines:  5.7-6.4%  Consistent with prediabetes  >or=6.5%  Consistent with diabetes  High levels of fetal hemoglobin interfere with the HbA1C  assay. Heterozygous hemoglobin variants (HbS, HgC, etc)do  not significantly interfere with this assay.   However, presence of multiple variants may  "affect accuracy.     01/23/2019 7.5 (H) 4.0 - 5.6 % Final     Comment:     ADA Screening Guidelines:  5.7-6.4%  Consistent with prediabetes  >or=6.5%  Consistent with diabetes  High levels of fetal hemoglobin interfere with the HbA1C  assay. Heterozygous hemoglobin variants (HbS, HgC, etc)do  not significantly interfere with this assay.   However, presence of multiple variants may affect accuracy.           EXAM:   Vitals:    12/30/19 1138   Weight: 129.7 kg (286 lb)   Height: 5' 5" (1.651 m)       General: Pt. is well-developed, well-nourished, appears stated age, in no acute distress, alert and oriented x 3.      Vascular: Dorsalis pedis and posterior tibial pulses are 2/4 bilaterally. 3 secs capillary refill time and toes are warm to touch.    There is reduced hair growth on the feet.    There is 1+ edema.  no varicosities.      Neurological:  Light touch, proprioception, and sharp/dull sensation are all intact bilaterally. Protective threshold with the [place] monofilament is diminished bilaterally.   +paresthesias      Dermatological:  The skin is thin    The toenails  1-5 L and 1-5 R  are greenish- yellow, long by 5-6mm and thickened by 2-3mm with subungual debris  .   There are no open wounds. There is no ecchymoses.  no erythema noted.   Skin diffusely dry on the soles     Interdigital spaces are intact, no fissures    Skin atrophic, thin, dry and mildly hyperpigmented.     Musculoskeletal:  Muscle strength is 5/5 in all groups bilaterally.  Metatarsophalangeal, subtalar, and ankle range of motion are intact without crepitus.           Assessment / Plan:    Problem List Items Addressed This Visit     None      Visit Diagnoses     Type II diabetes mellitus with neurological manifestations    -  Primary    Corns and callus        Dermatophytosis of nail              I counseled the patient on the patient's conditions, their implications and medical management.     Shoe inspection. Diabetic Foot " Education. Patient reminded of the importance of good nutrition and blood sugar control to help prevent podiatric complications of diabetes. Patient instructed on proper foot hygiene. We discussed wearing proper shoe gear, daily foot inspections, never walking without protective shoe gear, never putting sharp instruments to feet, and routine diabetic foot exam and care every 3-4 months to prevent complications.      Discussed regular and routine moisturizer to skin of both feet to help improve dry skin. Advised to apply twice daily until resolution of symptoms. Avoid between toes.         Routine Foot Care  Date/Time: 12/30/2019 11:00 AM  Performed by: Stacey Rowland DPM  Authorized by: Stacey Rowland DPM     Consent Done?:  Yes (Verbal)  Hyperkeratotic Skin Lesions?: Yes    Number of trimmed lesions:  2  Location(s):  Left Plantar and Right Plantar    Nail Care Type:  Debride  Location(s): All  (Left 1st Toe, Left 3rd Toe, Left 2nd Toe, Left 4th Toe, Left 5th Toe, Right 1st Toe, Right 2nd Toe, Right 3rd Toe, Right 4th Toe and Right 5th Toe)  Patient tolerance:  Patient tolerated the procedure well with no immediate complications     With patient's permission, the toenails mentioned above were aggressively reduced and debrided using a nail nipper, removing all offending nail and debris. Utilizing a #15 scalpel, I trimmed the corns and calluses at the above mentioned location.      The patient will continue to monitor the areas daily, inspect the feet, wear protective shoe gear when ambulatory, and moisturizer to maintain skin integrity.               This patient has documented high risk feet requiring routine maintenance secondary to diabetes     follow up 3-4 months or sooner if concerned.

## 2019-12-31 ENCOUNTER — HOSPITAL ENCOUNTER (OUTPATIENT)
Facility: OTHER | Age: 54
Discharge: HOME | End: 2019-12-31
Attending: ANESTHESIOLOGY | Admitting: ANESTHESIOLOGY
Payer: MEDICARE

## 2019-12-31 VITALS
DIASTOLIC BLOOD PRESSURE: 73 MMHG | OXYGEN SATURATION: 98 % | BODY MASS INDEX: 46.65 KG/M2 | SYSTOLIC BLOOD PRESSURE: 121 MMHG | HEIGHT: 65 IN | WEIGHT: 280 LBS | HEART RATE: 64 BPM | RESPIRATION RATE: 18 BRPM | TEMPERATURE: 98 F

## 2019-12-31 DIAGNOSIS — G89.29 CHRONIC PAIN: ICD-10-CM

## 2019-12-31 DIAGNOSIS — G89.4 CHRONIC PAIN SYNDROME: Primary | ICD-10-CM

## 2019-12-31 DIAGNOSIS — M47.816 LUMBAR SPONDYLOSIS: ICD-10-CM

## 2019-12-31 DIAGNOSIS — M47.816 OSTEOARTHRITIS OF LUMBAR SPINE, UNSPECIFIED SPINAL OSTEOARTHRITIS COMPLICATION STATUS: ICD-10-CM

## 2019-12-31 LAB — POCT GLUCOSE: 116 MG/DL (ref 70–110)

## 2019-12-31 PROCEDURE — 63600175 PHARM REV CODE 636 W HCPCS: Performed by: STUDENT IN AN ORGANIZED HEALTH CARE EDUCATION/TRAINING PROGRAM

## 2019-12-31 PROCEDURE — 25000003 PHARM REV CODE 250: Performed by: ANESTHESIOLOGY

## 2019-12-31 PROCEDURE — 99152 PR MOD CONSCIOUS SEDATION, SAME PHYS, 5+ YRS, FIRST 15 MIN: ICD-10-PCS | Mod: ,,, | Performed by: ANESTHESIOLOGY

## 2019-12-31 PROCEDURE — 99152 MOD SED SAME PHYS/QHP 5/>YRS: CPT | Mod: ,,, | Performed by: ANESTHESIOLOGY

## 2019-12-31 PROCEDURE — 63600175 PHARM REV CODE 636 W HCPCS: Performed by: ANESTHESIOLOGY

## 2019-12-31 PROCEDURE — 64636 DESTROY L/S FACET JNT ADDL: CPT | Performed by: ANESTHESIOLOGY

## 2019-12-31 PROCEDURE — 64635 DESTROY LUMB/SAC FACET JNT: CPT | Performed by: ANESTHESIOLOGY

## 2019-12-31 PROCEDURE — 64635 PR DESTROY LUMB/SAC FACET JNT: ICD-10-PCS | Mod: RT,,, | Performed by: ANESTHESIOLOGY

## 2019-12-31 PROCEDURE — 64636 PR DESTROY L/S FACET JNT ADDL: ICD-10-PCS | Mod: RT,,, | Performed by: ANESTHESIOLOGY

## 2019-12-31 PROCEDURE — 64635 DESTROY LUMB/SAC FACET JNT: CPT | Mod: RT,,, | Performed by: ANESTHESIOLOGY

## 2019-12-31 PROCEDURE — 64636 DESTROY L/S FACET JNT ADDL: CPT | Mod: RT,,, | Performed by: ANESTHESIOLOGY

## 2019-12-31 RX ORDER — MIDAZOLAM HYDROCHLORIDE 1 MG/ML
INJECTION INTRAMUSCULAR; INTRAVENOUS
Status: DISCONTINUED | OUTPATIENT
Start: 2019-12-31 | End: 2019-12-31 | Stop reason: HOSPADM

## 2019-12-31 RX ORDER — SODIUM CHLORIDE 9 MG/ML
500 INJECTION, SOLUTION INTRAVENOUS CONTINUOUS
Status: DISCONTINUED | OUTPATIENT
Start: 2019-12-31 | End: 2019-12-31 | Stop reason: HOSPADM

## 2019-12-31 RX ORDER — LIDOCAINE HYDROCHLORIDE 10 MG/ML
INJECTION INFILTRATION; PERINEURAL
Status: DISCONTINUED | OUTPATIENT
Start: 2019-12-31 | End: 2019-12-31 | Stop reason: HOSPADM

## 2019-12-31 RX ORDER — BUPIVACAINE HYDROCHLORIDE 2.5 MG/ML
INJECTION, SOLUTION EPIDURAL; INFILTRATION; INTRACAUDAL
Status: DISCONTINUED | OUTPATIENT
Start: 2019-12-31 | End: 2019-12-31 | Stop reason: HOSPADM

## 2019-12-31 RX ORDER — FENTANYL CITRATE 50 UG/ML
INJECTION, SOLUTION INTRAMUSCULAR; INTRAVENOUS
Status: DISCONTINUED | OUTPATIENT
Start: 2019-12-31 | End: 2019-12-31 | Stop reason: HOSPADM

## 2019-12-31 RX ADMIN — SODIUM CHLORIDE 500 ML: 0.9 INJECTION, SOLUTION INTRAVENOUS at 07:12

## 2019-12-31 NOTE — INTERVAL H&P NOTE
HPI  Patient presenting for Procedure(s) (LRB):  RADIOFREQUENCY ABLATION, RIGHT L2-L3-L4-L5  2 OF 2 (Right)     Patient on Anti-coagulation No    No health changes since previous encounter    Past Medical History:   Diagnosis Date    Allergy     Anemia     Asthma     Bilateral shoulder bursitis     Cervical stenosis of spine     Chronic pain     DDD (degenerative disc disease), cervical     Depression 1/28/2019    Diabetes mellitus type II     DJD (degenerative joint disease) of knee 6/19/2014    Facet arthritis of lumbar region 12/17/2015    Facet syndrome 12/17/2015    GERD (gastroesophageal reflux disease)     Heartburn     Hyperlipidemia     Hypertension     Morbid obesity     Neuromuscular disorder     PADDY (obstructive sleep apnea)     Proteinuria     Right carpal tunnel syndrome     Sacroiliitis 6/13/2018    Sacroiliitis     Sleep apnea      Past Surgical History:   Procedure Laterality Date    CARPAL TUNNEL RELEASE      CARPAL TUNNEL RELEASE  1980s    left    CARPAL TUNNEL RELEASE  2012    right    ESOPHAGOGASTRODUODENOSCOPY N/A 3/27/2019    Procedure: EGD (ESOPHAGOGASTRODUODENOSCOPY);  Surgeon: Robbin Bar MD;  Location: [address]);  Service: Endoscopy;  Laterality: N/A;  BMI 61.3/2nd floor case/svn    JOINT REPLACEMENT Bilateral     with 2 revisions on rt    KNEE SURGERY  3/2010    orthroscope    KNEE SURGERY  6-19-14    left TKR    LAPAROSCOPIC SLEEVE GASTRECTOMY N/A 8/28/2019    Procedure: GASTRECTOMY, SLEEVE, LAPAROSCOPIC with intraop EGD;  Surgeon: Heriberto Clements MD;  [facility];  Service: General;  Laterality: N/A;    RADIOFREQUENCY ABLATION Right 7/9/2019    Procedure: RADIOFREQUENCY ABLATION;  Surgeon: Lina Wagner MD;  [facility];  Service: Pain Management;  Laterality: Right;  RIGHT RFA L2,3,4,5  2 of 2    RADIOFREQUENCY ABLATION Left 12/19/2019    Procedure: RADIOFREQUENCY ABLATION, LEFT L2-L3-L4-L5 MEDIAL  "BRANCH 1 OF 2;  Surgeon: Lina Wagner MD;  Location: [facility]IN [facility]GT;  Service: Pain Management;  Laterality: Left;    RADIOFREQUENCY ABLATION OF LUMBAR MEDIAL BRANCH NERVE AT SINGLE LEVEL Left 6/26/2018    Procedure: RADIOFREQUENCY ABLATION, NERVE, MEDIAL BRANCH, LUMBAR, 1 LEVEL;  Surgeon: Lina Wagner MD;  Location: [facility]IN [facility]GT;  Service: Pain Management;  Laterality: Left;  Left Cooled RFA @ L2,3,4,5  [id number]  with Sedation    1 of 2    RADIOFREQUENCY ABLATION OF LUMBAR MEDIAL BRANCH NERVE AT SINGLE LEVEL Right 7/10/2018    Procedure: RADIOFREQUENCY ABLATION, NERVE, MEDIAL BRANCH, LUMBAR, 1 LEVEL;  Surgeon: Lina Wagner MD;  Location: [facility]IN [facility];  Service: Pain Management;  Laterality: Right;  RIght Cooled RFA @ L2,3,4,5  [id number] with Sedation    2 of 2    TRIGGER FINGER RELEASE Right 2017    TRIGGER FINGER RELEASE Left 7/29/2019    Procedure: RELEASE, TRIGGER FINGER, Left Thumb;  Surgeon: Velma Garcia MD;  Location: [facility] OR;  Service: Orthopedics;  Laterality: Left;  Local w/ MAC     Review of patient's allergies indicates:   Allergen Reactions    Strawberry Anaphylaxis      Current Facility-Administered Medications   Medication    0.9%  NaCl infusion       PMHx, PSHx, Allergies, Medications reviewed in epic    ROS negative except pain complaints in HPI    OBJECTIVE:    /77 (BP Location: Right arm, Patient Position: Lying)   Temp 98 °F (36.7 °C) (Oral)   Resp 18   Ht 5' 5" (1.651 m)   Wt 127 kg (280 lb)   LMP 06/26/2018   SpO2 97%   BMI 46.59 kg/m²     PHYSICAL EXAMINATION:    GENERAL: Well appearing, in no acute distress, alert and oriented x3.  PSYCH:  Mood and affect appropriate.  SKIN: Skin color, texture, turgor normal, no rashes or lesions which will impact the procedure.  CV: RRR with palpation of the radial artery.  PULM: No evidence of respiratory difficulty, symmetric chest rise. Clear to auscultation.  NEURO: Cranial nerves grossly " intact.    Plan:    Proceed with procedure as planned Procedure(s) (LRB):  RADIOFREQUENCY ABLATION, RIGHT L2-L3-L4-L5  2 OF 2 (Right)    Chung Moreno MD  12/31/2019              The patient has been examined and the H&P has been reviewed:    I concur with the findings and no changes have occurred since H&P was written.    Anesthesia/Surgery risks, benefits and alternative options discussed and understood by patient/family.          Active Hospital Problems    Diagnosis  POA    Chronic pain [G89.29]  Yes      Resolved Hospital Problems   No resolved problems to display.

## 2019-12-31 NOTE — OP NOTE
Lumbar Medial nerve branch block radiofrequency ablation Under Fluoroscopy     Time-out taken to identify patient and procedure side prior to starting the procedure.     12/31/2019    PROCEDURE: Right radiofrequency ablation of the the medial branch nerves at the   transverse process of  L3, L4, L5 and sacral ala    2)Conscious sedation provided by MD     REASON FOR PROCEDURE: Lumbar spondylosis [M47.816]     PHYSICIAN: Lina Wagner MD     ASSISTANTS: None     MEDICATIONS INJECTED: 0.25% Bupivicaine total 8mL     LOCAL ANESTHETIC USED: Xylocaine 1% 1mL / site     ESTIMATED BLOOD LOSS: None.     COMPLICATIONS: None.     Interval history: Patient reports that he had complete relief of pain for the day of the procedure, we will proceed with the RFA     TECHNIQUE: Laying in a prone position, the patient was prepped and draped in the usual sterile fashion using ChloraPrep and fenestrated drape. The level was determined under fluoroscopic guidance. Local anesthetic was given by going down to the hub of the 27-gauge 1.25in needle and raising a wheel. A 18-gauge 10mm curved active tip needle was introduced to the anatomic local of the medial branch at each of the above levels using fluoroscopy. Then sensory and motor testing was performed to confirm that the needle tips were in the correct location. Then after negative aspiration, 1 mL of 0.25% bupivacaine was injected into each level. This was followed by thermal lesioning at 80 degrees celsius for 90 seconds.     Conscious sedation provided by M.D   The patient was monitored with continuous pulse oximetry, EKG, and intermittent blood pressure monitors. The patient was hemodynamically stable throughout the entire process was responsive to voice, and breathing spontaneously. Supplemental O2 was provided at 2L/min via nasal cannula. Patient was comfortable for the duration of the procedure. (See nurse documentation and case log for sedation time)    There was a total of  2mg IV Midazolam and 75mcg Fentanyl titrated for the procedure    The patient tolerated the procedure well. Did not have any procedure related motor deficit at the conclusion of the procedure    The patient was monitored after the procedure. Patient was given post procedure and discharge instructions to follow at home. We will see the patient back in two weeks or the patient may call to inform of status. The patient was discharged in a stable condition

## 2019-12-31 NOTE — DISCHARGE INSTRUCTIONS

## 2020-01-02 ENCOUNTER — LAB VISIT (OUTPATIENT)
Dept: LAB | Facility: HOSPITAL | Age: 55
End: 2020-01-02
Attending: INTERNAL MEDICINE
Payer: MEDICARE

## 2020-01-02 DIAGNOSIS — R73.9 HYPERGLYCEMIA: ICD-10-CM

## 2020-01-02 DIAGNOSIS — E78.5 HYPERLIPIDEMIA, UNSPECIFIED HYPERLIPIDEMIA TYPE: ICD-10-CM

## 2020-01-02 LAB
ALBUMIN SERPL BCP-MCNC: 3.3 G/DL (ref 3.5–5.2)
ALP SERPL-CCNC: 51 U/L (ref 55–135)
ALT SERPL W/O P-5'-P-CCNC: 14 U/L (ref 10–44)
ANION GAP SERPL CALC-SCNC: 9 MMOL/L (ref 8–16)
AST SERPL-CCNC: 18 U/L (ref 10–40)
BILIRUB SERPL-MCNC: 0.7 MG/DL (ref 0.1–1)
BUN SERPL-MCNC: 8 MG/DL (ref 6–20)
CALCIUM SERPL-MCNC: 9.9 MG/DL (ref 8.7–10.5)
CHLORIDE SERPL-SCNC: 105 MMOL/L (ref 95–110)
CHOLEST SERPL-MCNC: 160 MG/DL (ref 120–199)
CHOLEST/HDLC SERPL: 3.3 {RATIO} (ref 2–5)
CK SERPL-CCNC: 72 U/L (ref 20–180)
CO2 SERPL-SCNC: 27 MMOL/L (ref 23–29)
CREAT SERPL-MCNC: 0.7 MG/DL (ref 0.5–1.4)
EST. GFR  (AFRICAN AMERICAN): >60 ML/MIN/1.73 M^2
EST. GFR  (NON AFRICAN AMERICAN): >60 ML/MIN/1.73 M^2
ESTIMATED AVG GLUCOSE: 143 MG/DL (ref 68–131)
GLUCOSE SERPL-MCNC: 110 MG/DL (ref 70–110)
HBA1C MFR BLD HPLC: 6.6 % (ref 4–5.6)
HDLC SERPL-MCNC: 49 MG/DL (ref 40–75)
HDLC SERPL: 30.6 % (ref 20–50)
LDLC SERPL CALC-MCNC: 97.8 MG/DL (ref 63–159)
NONHDLC SERPL-MCNC: 111 MG/DL
POTASSIUM SERPL-SCNC: 4.5 MMOL/L (ref 3.5–5.1)
PROT SERPL-MCNC: 7 G/DL (ref 6–8.4)
SODIUM SERPL-SCNC: 141 MMOL/L (ref 136–145)
TRIGL SERPL-MCNC: 66 MG/DL (ref 30–150)

## 2020-01-02 PROCEDURE — 36415 COLL VENOUS BLD VENIPUNCTURE: CPT | Mod: PN

## 2020-01-02 PROCEDURE — 82550 ASSAY OF CK (CPK): CPT

## 2020-01-02 PROCEDURE — 80053 COMPREHEN METABOLIC PANEL: CPT

## 2020-01-02 PROCEDURE — 83036 HEMOGLOBIN GLYCOSYLATED A1C: CPT

## 2020-01-02 PROCEDURE — 80061 LIPID PANEL: CPT

## 2020-01-08 ENCOUNTER — OFFICE VISIT (OUTPATIENT)
Dept: ORTHOPEDICS | Facility: CLINIC | Age: 55
End: 2020-01-08
Payer: MEDICARE

## 2020-01-08 ENCOUNTER — HOSPITAL ENCOUNTER (OUTPATIENT)
Dept: RADIOLOGY | Facility: HOSPITAL | Age: 55
Discharge: HOME | End: 2020-01-08
Attending: ORTHOPAEDIC SURGERY
Payer: MEDICARE

## 2020-01-08 VITALS — WEIGHT: 280 LBS | HEIGHT: 65 IN | BODY MASS INDEX: 46.65 KG/M2

## 2020-01-08 DIAGNOSIS — M25.561 CHRONIC PAIN OF BOTH KNEES: ICD-10-CM

## 2020-01-08 DIAGNOSIS — M25.561 PAIN IN BOTH KNEES, UNSPECIFIED CHRONICITY: ICD-10-CM

## 2020-01-08 DIAGNOSIS — M25.562 CHRONIC PAIN OF BOTH KNEES: ICD-10-CM

## 2020-01-08 DIAGNOSIS — M25.562 PAIN IN BOTH KNEES, UNSPECIFIED CHRONICITY: ICD-10-CM

## 2020-01-08 DIAGNOSIS — G89.29 CHRONIC PAIN OF BOTH KNEES: ICD-10-CM

## 2020-01-08 PROCEDURE — 99213 PR OFFICE/OUTPT VISIT, EST, LEVL III, 20-29 MIN: ICD-10-PCS | Mod: S$PBB,,, | Performed by: ORTHOPAEDIC SURGERY

## 2020-01-08 PROCEDURE — 99999 PR PBB SHADOW E&M-EST. PATIENT-LVL III: CPT | Mod: PBBFAC,,, | Performed by: ORTHOPAEDIC SURGERY

## 2020-01-08 PROCEDURE — 99999 PR PBB SHADOW E&M-EST. PATIENT-LVL III: ICD-10-PCS | Mod: PBBFAC,,, | Performed by: ORTHOPAEDIC SURGERY

## 2020-01-08 PROCEDURE — 99213 OFFICE O/P EST LOW 20 MIN: CPT | Mod: PBBFAC,25 | Performed by: ORTHOPAEDIC SURGERY

## 2020-01-08 PROCEDURE — 99213 OFFICE O/P EST LOW 20 MIN: CPT | Mod: S$PBB,,, | Performed by: ORTHOPAEDIC SURGERY

## 2020-01-08 PROCEDURE — 73562 X-RAY EXAM OF KNEE 3: CPT | Mod: 26,50,, | Performed by: RADIOLOGY

## 2020-01-08 PROCEDURE — 73562 X-RAY EXAM OF KNEE 3: CPT | Mod: TC,50

## 2020-01-08 PROCEDURE — 73562 XR KNEE ORTHO BILAT: ICD-10-PCS | Mod: 26,50,, | Performed by: RADIOLOGY

## 2020-01-08 NOTE — LETTER
January 8, 2020      Clarisse Richardson MD  [address]  [facility] [address]           [facility]  [address]  [facility] [address]  Phone: [phone]          Patient: Alivia Thomas   MR Number: [id number]   YOB: 1965   Date of Visit: 1/8/2020       Dear Dr. Clarisse Richardson:    Thank you for referring Alivia Thomas to me for evaluation. Attached you will find relevant portions of my assessment and plan of care.    If you have questions, please do not hesitate to call me. I look forward to following Alivia Thomas along with you.    Sincerely,    Theodore Swan MD    Enclosure  CC:  No Recipients    If you would like to receive this communication electronically, please contact [email or web address] or [phone] to request more information on [employer] access.    For providers and/or their staff who would like to refer a patient to [facility], please contact us through our one-stop-shop provider referral line, [facility], at [phone].    If you feel you have received this communication in error or would no longer like to receive these types of communications, please e-mail [email or web address]

## 2020-01-08 NOTE — PROGRESS NOTES
"Subjective:      Patient ID: Alivia Thomas is a 54 y.o. female.    Chief Complaint: Pain of the Left Knee and Pain of the Right Knee    HPI  Alivia Thomas has bilateral knee pain.  The pain is unchanged. The pain is located in the medial aspect of the knees.  There  is not radiation. There is not associated stiffness.   There is not catching and locking. The pain is described as achy. The pain is aggravated by when the knees touch at night.  It is alleviated by rest.  There is associated back pain.  Her history, medications and problem list were reviewed. SHe had bariatric surgery and has lost over qoo labs    Review of Systems   Constitution: Negative for chills, fever and night sweats.   HENT: Negative for hearing loss.    Eyes: Negative for blurred vision and double vision.   Cardiovascular: Negative for chest pain, claudication and leg swelling.   Respiratory: Negative for shortness of breath.    Endocrine: Negative for polydipsia, polyphagia and polyuria.   Hematologic/Lymphatic: Negative for adenopathy and bleeding problem. Does not bruise/bleed easily.   Skin: Negative for poor wound healing.   Musculoskeletal: Positive for joint pain.   Gastrointestinal: Negative for diarrhea and heartburn.   Genitourinary: Negative for bladder incontinence.   Neurological: Negative for focal weakness, headaches, numbness, paresthesias and sensory change.   Psychiatric/Behavioral: The patient is not nervous/anxious.    Allergic/Immunologic: Negative for persistent infections.         Objective:      Body mass index is 46.59 kg/m².  Vitals:    01/08/20 1527   Weight: 127 kg (279 lb 15.8 oz)   Height: 5' 5" (1.651 m)           General    Constitutional: She is oriented to person, place, and time. She appears well-developed and well-nourished.   obese   HENT:   Head: Normocephalic and atraumatic.   Eyes: EOM are normal.   Cardiovascular: Normal rate and regular rhythm.    Pulmonary/Chest: Effort normal.   Neurological: " She is alert and oriented to person, place, and time.   Psychiatric: She has a normal mood and affect. Her behavior is normal.     General Musculoskeletal Exam   Gait: normal       Right Knee Exam     Inspection   Erythema: absent  Scars: present  Swelling: absent  Effusion: absent  Deformity: absent  Bruising: absent    Tenderness   The patient is tender to palpation of the patella.    Range of Motion   Extension: 10   Flexion: 120     Tests   Ligament Examination Lachman: normal (-1 to 2mm)   MCL - Valgus: normal (0 to 2mm)  LCL - Varus: normal  Patella   Passive Patellar Tilt: neutral    Other   Sensation: normal    Left Knee Exam     Inspection   Erythema: absent  Scars: present  Swelling: absent  Effusion: absent  Deformity: absent  Bruising: absent    Tenderness   The patient tender to palpation of the patella.    Crepitus   The patient has crepitus of the patella.    Range of Motion   Extension: 0   Flexion: 120     Tests   Stability Lachman: normal (-1 to 2mm)   MCL - Valgus: normal (0 to 2mm)  LCL - Varus: normal (0 to 2mm)  Patella   Passive Patellar Tilt: neutral    Other   Sensation: normal    Muscle Strength   Right Lower Extremity   Hip Abduction: 5/5   Quadriceps:  5/5   Hamstrin/5   Left Lower Extremity   Hip Abduction: 5/5   Quadriceps:  5/5   Hamstrin/5     Reflexes     Left Side  Quadriceps:  2+    Right Side   Quadriceps:  2+    Vascular Exam     Right Pulses  Dorsalis Pedis:      2+          Left Pulses  Dorsalis Pedis:      2+          Edema  Right Lower Leg: absent  Left Lower Leg: absent    Radiographs taken today were reviewed by me.  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  The prostheses are well positioned.  There is not evidence of bone loss, osteolysis, or loosening. Fragmentation of the patella bilaterally              Assessment:       Encounter Diagnosis   Name Primary?    Chronic pain of both knees           Plan:       Alivia was seen today for pain  and pain.    Diagnoses and all orders for this visit:    Chronic pain of both knees  -     Ambulatory referral/consult to Orthopedics  -     Ambulatory referral to Pain Clinic        Options discussed.  Will re-try bilateral knee RFA.      F/U 6-8 weeks

## 2020-01-13 ENCOUNTER — TELEPHONE (OUTPATIENT)
Dept: INTERNAL MEDICINE | Facility: CLINIC | Age: 55
End: 2020-01-13

## 2020-01-13 DIAGNOSIS — M51.36 DDD (DEGENERATIVE DISC DISEASE), LUMBAR: ICD-10-CM

## 2020-01-13 DIAGNOSIS — M25.562 BILATERAL CHRONIC KNEE PAIN: ICD-10-CM

## 2020-01-13 DIAGNOSIS — G89.29 BILATERAL CHRONIC KNEE PAIN: ICD-10-CM

## 2020-01-13 DIAGNOSIS — M47.816 SPONDYLOSIS OF LUMBAR REGION WITHOUT MYELOPATHY OR RADICULOPATHY: ICD-10-CM

## 2020-01-13 DIAGNOSIS — Z79.891 ENCOUNTER FOR LONG-TERM OPIATE ANALGESIC USE: ICD-10-CM

## 2020-01-13 DIAGNOSIS — Z96.651 STATUS POST TOTAL RIGHT KNEE REPLACEMENT: ICD-10-CM

## 2020-01-13 DIAGNOSIS — G89.4 CHRONIC PAIN DISORDER: ICD-10-CM

## 2020-01-13 DIAGNOSIS — M25.561 BILATERAL CHRONIC KNEE PAIN: ICD-10-CM

## 2020-01-13 DIAGNOSIS — J45.20 ASTHMA, WELL CONTROLLED, MILD INTERMITTENT: ICD-10-CM

## 2020-01-13 DIAGNOSIS — M47.816 FACET ARTHRITIS OF LUMBAR REGION: ICD-10-CM

## 2020-01-13 DIAGNOSIS — M17.0 PRIMARY OSTEOARTHRITIS OF BOTH KNEES: ICD-10-CM

## 2020-01-13 RX ORDER — PROMETHAZINE HYDROCHLORIDE AND DEXTROMETHORPHAN HYDROBROMIDE 6.25; 15 MG/5ML; MG/5ML
5 SYRUP ORAL EVERY 6 HOURS PRN
Qty: 118 ML | Refills: 0 | Status: SHIPPED | OUTPATIENT
Start: 2020-01-13 | End: 2020-01-23

## 2020-01-13 RX ORDER — OXYCODONE AND ACETAMINOPHEN 10; 325 MG/1; MG/1
1 TABLET ORAL EVERY 8 HOURS PRN
Qty: 90 TABLET | Refills: 0 | Status: SHIPPED | OUTPATIENT
Start: 2020-01-13 | End: 2020-01-31 | Stop reason: SDUPTHER

## 2020-01-13 RX ORDER — ALBUTEROL SULFATE 90 UG/1
AEROSOL, METERED RESPIRATORY (INHALATION)
Qty: 18 EACH | Refills: 0 | Status: SHIPPED | OUTPATIENT
Start: 2020-01-13 | End: 2021-11-08 | Stop reason: SDUPTHER

## 2020-01-13 RX ORDER — LEVOFLOXACIN 25 MG/ML
500 SOLUTION ORAL DAILY
Qty: 200 ML | Refills: 0 | Status: SHIPPED | OUTPATIENT
Start: 2020-01-13 | End: 2020-11-04

## 2020-01-13 NOTE — TELEPHONE ENCOUNTER
----- Message from Diaz Dobson sent at 1/13/2020  9:12 AM CST -----  Contact: DONTAE MOON [[id number]]      Can the clinic reply in [facility]: no      Please refill the medication(s) listed below. Please call the patient when the prescription(s) is ready for  at this phone number   [phone]       Medication #1 oxyCODONE-acetaminophen (PERCOCET)  mg per tablet    Preferred Pharmacy: [facility] - [address]

## 2020-01-13 NOTE — TELEPHONE ENCOUNTER
Patient requesting refill on Percocet 10-325mg  Last office visit 10.16.19   shows last refill on 12.13.19  Patient does have a pain contract on file with [facility]  Patient last UDS 10.16.19 was consistent with current therapy

## 2020-01-22 RX ORDER — LEVOFLOXACIN 500 MG/1
500 TABLET, FILM COATED ORAL DAILY
Qty: 10 TABLET | Refills: 0 | Status: SHIPPED | OUTPATIENT
Start: 2020-01-22 | End: 2020-11-04

## 2020-01-31 ENCOUNTER — OFFICE VISIT (OUTPATIENT)
Dept: PAIN MEDICINE | Facility: CLINIC | Age: 55
End: 2020-01-31
Payer: MEDICARE

## 2020-01-31 ENCOUNTER — TELEPHONE (OUTPATIENT)
Dept: PAIN MEDICINE | Facility: CLINIC | Age: 55
End: 2020-01-31

## 2020-01-31 VITALS
HEART RATE: 86 BPM | DIASTOLIC BLOOD PRESSURE: 79 MMHG | WEIGHT: 275.56 LBS | HEIGHT: 65 IN | BODY MASS INDEX: 45.91 KG/M2 | RESPIRATION RATE: 18 BRPM | OXYGEN SATURATION: 100 % | SYSTOLIC BLOOD PRESSURE: 124 MMHG | TEMPERATURE: 98 F

## 2020-01-31 DIAGNOSIS — M25.561 CHRONIC PAIN OF RIGHT KNEE: Primary | ICD-10-CM

## 2020-01-31 DIAGNOSIS — Z79.891 ENCOUNTER FOR LONG-TERM OPIATE ANALGESIC USE: ICD-10-CM

## 2020-01-31 DIAGNOSIS — G89.29 BILATERAL CHRONIC KNEE PAIN: ICD-10-CM

## 2020-01-31 DIAGNOSIS — M47.816 SPONDYLOSIS OF LUMBAR REGION WITHOUT MYELOPATHY OR RADICULOPATHY: ICD-10-CM

## 2020-01-31 DIAGNOSIS — G89.4 CHRONIC PAIN DISORDER: ICD-10-CM

## 2020-01-31 DIAGNOSIS — M17.0 PRIMARY OSTEOARTHRITIS OF BOTH KNEES: ICD-10-CM

## 2020-01-31 DIAGNOSIS — M25.561 BILATERAL CHRONIC KNEE PAIN: ICD-10-CM

## 2020-01-31 DIAGNOSIS — G89.29 CHRONIC PAIN OF RIGHT KNEE: Primary | ICD-10-CM

## 2020-01-31 DIAGNOSIS — Z96.651 STATUS POST TOTAL RIGHT KNEE REPLACEMENT: ICD-10-CM

## 2020-01-31 DIAGNOSIS — M51.36 DDD (DEGENERATIVE DISC DISEASE), LUMBAR: ICD-10-CM

## 2020-01-31 DIAGNOSIS — M47.816 FACET ARTHRITIS OF LUMBAR REGION: ICD-10-CM

## 2020-01-31 DIAGNOSIS — M25.562 BILATERAL CHRONIC KNEE PAIN: ICD-10-CM

## 2020-01-31 DIAGNOSIS — G89.4 CHRONIC PAIN SYNDROME: Primary | ICD-10-CM

## 2020-01-31 DIAGNOSIS — M47.816 LUMBAR SPONDYLOSIS: ICD-10-CM

## 2020-01-31 PROCEDURE — 99999 PR PBB SHADOW E&M-EST. PATIENT-LVL IV: CPT | Mod: PBBFAC,,, | Performed by: NURSE PRACTITIONER

## 2020-01-31 PROCEDURE — 99214 OFFICE O/P EST MOD 30 MIN: CPT | Mod: PBBFAC | Performed by: NURSE PRACTITIONER

## 2020-01-31 PROCEDURE — 99999 PR PBB SHADOW E&M-EST. PATIENT-LVL IV: ICD-10-PCS | Mod: PBBFAC,,, | Performed by: NURSE PRACTITIONER

## 2020-01-31 PROCEDURE — 99214 PR OFFICE/OUTPT VISIT, EST, LEVL IV, 30-39 MIN: ICD-10-PCS | Mod: S$PBB,,, | Performed by: NURSE PRACTITIONER

## 2020-01-31 PROCEDURE — 99214 OFFICE O/P EST MOD 30 MIN: CPT | Mod: S$PBB,,, | Performed by: NURSE PRACTITIONER

## 2020-01-31 RX ORDER — OXYCODONE AND ACETAMINOPHEN 10; 325 MG/1; MG/1
1 TABLET ORAL EVERY 8 HOURS PRN
Qty: 90 TABLET | Refills: 0 | Status: SHIPPED | OUTPATIENT
Start: 2020-02-11 | End: 2020-03-09 | Stop reason: SDUPTHER

## 2020-01-31 NOTE — PROGRESS NOTES
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 offered little to no relief in her pain symptoms.  She has had no other health changes since previous encounter.    Previous encounter 1/21/2014:  HPI Comments: 47 yo female presents for initial evaluation of bilateral knee pain, L>R. She is s/p arthroscopic right knee surgery and eventual replacement and then revision surgeries (Dr. Swan, Orthopedics). The pain is present in both knees and is described as a terrible ache. She hears occasional popping in both knees with movement. The pain is worse with being on her feet and getting up from a sitting to standing position. Denies lower ext weakness or paresthesias. She does not have back pain or pain radiating down her legs. The pain is better with Celebrex and rest. She also takes Vicodin ES TID but this makes her very sleepy. She is not sure it helps with the pain because she usually falls asleep.     Physical Therapy: not since 2011 just prior to her 3rd right knee surgery (revision after replacement); has tried swimming which helps with weight loss    Non-pharmacologic Treatment: none    Pain Medications: Percocet and celebrex    Blood thinners: ASA 81 mg daily     Interventional Therapies:   steroid inj and visco-supplementation (series of 3) in left knee- not helpful  3/31/14 Bilateral genicular nerve blocks  2/16/16 Bilateral  L2,3,4,5 MBB  3/1/16 Bilateral L2,3,4,5 MBB   4/6/16 Left L2,3,4,5 RFA- significant relief  4/20/16 Right L2,3,4,5 RFA- significant relief  10/5/16 Left L2,3,4,5 cooled RFA  10/19/16 Right L2,3,4,5 cooled RFA  4/26/17 Left L2,3,4,5 cooled RFA  5/9/17 Right L2,3,4,5 cooled RFA  12/26/2017- Left L2,3,4,5 cooled RFA  1/9/2018- Right L2,3,4,5 cooled RFA  6/26/18 Left L2,3,4,5 cooled RFA- 60% relief  7/10/18 Right L2,3,4,5 cooled RFA- 60% relief  9/28/18 TPIs- significant relief  12/27/18 Left L2,3,4,5 RFA- 70% relief  1/29/19 Right L2,3,4,5 RFA- 70% relief  6/18/19 Left L2,3,4,5 RFA- 70% relief  7/9/19 Right L2,3,4,5 RFA- 50% relief  12/19/19 Left L2,3,4,5 RFA- 80% relief  12/31/19 Right L2,3,4,5 RFA- 50% relief    Relevant Surgeries: right knee surgery x3 (arthroscopic, then replacement and subsequent revision surgery), s/p left total knee arthroplasty    Relevant Imaging:  Xray Lumbar spine 09/21/2015  Lumbar spine radiograph    Comparison: None    Results: AP, lateral neutral, lateral flexion , lateral extension, bilateral oblique and spot views. The alignment of the lumbar spine demonstrates a mild levoscoliosis . 11-mm anterior listhesis of L4 relative to L5 with no translational abnormalities  seen on flexion and extension views. The vertebral body heights are well-maintained , mild disk space narrowing L4-L5 and L5-S1. Mild anterior and marginal osteophyte formation seen throughout the lumbar spine . The oblique views demonstrate no   definite spondylolysis. There is facet joint osseous hypertrophy noted at L3-L4 and L4-L5.      Impression         The Significant spondylosis of the lumbar spine with grade 1 anterior listhesis L4 relative to L5.  Facet joint osseous hypertrophy L3-L4 and L4-5.      Electronically signed by: BLESSING ROE MD  Date: 09/21/15  Time: 09:56          X-ray knee bilateral 8/26/2015:  Standing AP knees, lateral view of both knees and sunrise view of both patella. Study compared to  May 2015. Postop change of bilateral knee replacement. The prosthetic components are in satisfactory position. Erosive changes involving the patella   again evident bilaterally.    Impression no significant change.      Xray Bilateral Knee 05/25/2015:  There are bilateral total knee arthroplasties with posterior resurfacing of the patella. As observed on 12/17/2014 there is a fracture of the left patella in the parasagittal plane.    Heterotopic bone is evident about each knee.    I detect no dislocation, unusual radiopaque retained foreign body, lytic or blastic lesion, or chondrocalcinosis.    Cervical MRI 4/24/2018:    Narrative     EXAMINATION:  MRI CERVICAL SPINE WITHOUT CONTRAST    CLINICAL HISTORY:  Neck pain, first study;.  Cervicalgia.    TECHNIQUE:  Multiplanar, multisequence MR images of the cervical spine were acquired without the administration of contrast.    COMPARISON:  None.    FINDINGS:  There is reversal of the normal cervical lordosis which could be related to patient positioning versus muscle spasm.    Cervical vertebral body heights are well maintained without evidence of acute fracture or dislocation.  Marrow signal is unremarkable.    Spinal canal contents are unremarkable.  No abnormal masses or collections.    Individual levels as detailed below:    C2-3: No significant spinal canal stenosis or neuroforaminal narrowing.    C3-4: Facet arthropathy.  No significant spinal canal stenosis or neuroforaminal narrowing.    C4-5: Facet arthropathy.  Small broad-based disc osteophyte complex.  Mild right neuroforaminal narrowing.  Mild spinal canal stenosis.    C5-6: Facet arthropathy.  Uncovertebral joint spurring.  Broad-based posterior disc osteophyte complex.  Mild right neuroforaminal narrowing.  Mild spinal canal stenosis.    C6-7: Facet arthropathy.  No significant spinal canal stenosis or neuroforaminal narrowing.    C7-T1: No significant spinal canal stenosis or neuroforaminal  narrowing.    Paraspinal muscles are unremarkable.  Soft tissues are intact.   Impression       Mild multilevel cervical spondylosis with mild spinal canal stenosis and mild right neuroforaminal narrowing at C4-5 and C5-6.       Lab Results   Component Value Date    HGBA1C 6.6 (H) 01/02/2020     Lab Results   Component Value Date    CHOL 160 01/02/2020    CHOL 221 (H) 11/14/2019    CHOL 161 01/23/2019     Lab Results   Component Value Date    HDL 49 01/02/2020    HDL 46 11/14/2019    HDL 51 01/23/2019     Lab Results   Component Value Date    LDLCALC 97.8 01/02/2020    LDLCALC 157.0 11/14/2019    LDLCALC 94.2 01/23/2019     Lab Results   Component Value Date    TRIG 66 01/02/2020    TRIG 90 11/14/2019    TRIG 79 01/23/2019     Lab Results   Component Value Date    CHOLHDL 30.6 01/02/2020    CHOLHDL 20.8 11/14/2019    CHOLHDL 31.7 01/23/2019         Past Medical History:   Diagnosis Date    Allergy     Anemia     Asthma     Bilateral shoulder bursitis     Cervical stenosis of spine     Chronic pain     DDD (degenerative disc disease), cervical     Depression 1/28/2019    Diabetes mellitus type II     DJD (degenerative joint disease) of knee 6/19/2014    Facet arthritis of lumbar region 12/17/2015    Facet syndrome 12/17/2015    GERD (gastroesophageal reflux disease)     Heartburn     Hyperlipidemia     Hypertension     Morbid obesity     Neuromuscular disorder     PADDY (obstructive sleep apnea)     Proteinuria     Right carpal tunnel syndrome     Sacroiliitis 6/13/2018    Sacroiliitis     Sleep apnea      Past Surgical History:   Procedure Laterality Date    CARPAL TUNNEL RELEASE      CARPAL TUNNEL RELEASE  1980s    left    CARPAL TUNNEL RELEASE  2012    right    ESOPHAGOGASTRODUODENOSCOPY N/A 3/27/2019    Procedure: EGD (ESOPHAGOGASTRODUODENOSCOPY);  Surgeon: Robbin Bar MD;  Location: [facility] ([address] [place] [address]);  Service: Endoscopy;  Laterality: N/A;  BMI 61.3/2nd floor case/svn     JOINT REPLACEMENT Bilateral     with 2 revisions on rt    KNEE SURGERY  3/2010    orthroscope    KNEE SURGERY  6-19-14    left TKR    LAPAROSCOPIC SLEEVE GASTRECTOMY N/A 8/28/2019    Procedure: GASTRECTOMY, SLEEVE, LAPAROSCOPIC with intraop EGD;  Surgeon: Heriberto Clements MD;  [address]  Service: General;  Laterality: N/A;    RADIOFREQUENCY ABLATION Right 7/9/2019    Procedure: RADIOFREQUENCY ABLATION;  Surgeon: Lina Wagner MD;  Location: [facility] PAIN [facility]GT;  Service: Pain Management;  Laterality: Right;  RIGHT RFA L2,3,4,5  2 of 2    RADIOFREQUENCY ABLATION Left 12/19/2019    Procedure: RADIOFREQUENCY ABLATION, LEFT L2-L3-L4-L5 MEDIAL BRANCH 1 OF 2;  Surgeon: Lina Wagner MD;  Location: [facility] PAIN [facility]GT;  Service: Pain Management;  Laterality: Left;    RADIOFREQUENCY ABLATION Right 12/31/2019    Procedure: RADIOFREQUENCY ABLATION, RIGHT L2-L3-L4-L5  2 OF 2;  Surgeon: Lina Wagner MD;  Location: [facility]IN [facility];  Service: Pain Management;  Laterality: Right;    RADIOFREQUENCY ABLATION OF LUMBAR MEDIAL BRANCH NERVE AT SINGLE LEVEL Left 6/26/2018    Procedure: RADIOFREQUENCY ABLATION, NERVE, MEDIAL BRANCH, LUMBAR, 1 LEVEL;  Surgeon: Lina Wagner MD;  Location: [facility]IN [facility]GT;  Service: Pain Management;  Laterality: Left;  Left Cooled RFA @ L2,3,4,5  [address]  with Sedation    1 of 2    RADIOFREQUENCY ABLATION OF LUMBAR MEDIAL BRANCH NERVE AT SINGLE LEVEL Right 7/10/2018    Procedure: RADIOFREQUENCY ABLATION, NERVE, MEDIAL BRANCH, LUMBAR, 1 LEVEL;  Surgeon: Lina Wagner MD;  Location: [facility]IN [facility];  Service: Pain Management;  Laterality: Right;  RIght Cooled RFA @ L2,3,4,5  [address]371-11[address] with Sedation    2 of 2    TRIGGER FINGER RELEASE Right 2017    TRIGGER FINGER RELEASE Left 7/29/2019    Procedure: RELEASE, TRIGGER FINGER, Left Thumb;  Surgeon: Velma Garcia MD;  Location: [facility] OR;  Service: Orthopedics;  Laterality: Left;  Local w/ MAC     Family  History   Problem Relation Age of Onset    Diabetes Mother     Cataracts Mother     Diabetes Father     Cataracts Father     Coronary artery disease Brother     Diabetes Brother     Hypertension Sister     Diabetes Sister     No Known Problems Sister     Cancer Sister         lymphoma     Diabetes Brother     Hypotension Brother     Kidney failure Brother     Hypotension Brother     Amblyopia Neg Hx     Blindness Neg Hx     Glaucoma Neg Hx     Macular degeneration Neg Hx     Retinal detachment Neg Hx     Strabismus Neg Hx     Stroke Neg Hx     Thyroid disease Neg Hx      Social History     Socioeconomic History    Marital status:      Spouse name: Not on file    Number of children: Not on file    Years of education: Not on file    Highest education level: Not on file   Occupational History    Not on file   Social Needs    Financial resource strain: Not on file    Food insecurity:     Worry: Not on file     Inability: Not on file    Transportation needs:     Medical: Not on file     Non-medical: Not on file   Tobacco Use    Smoking status: Never Smoker    Smokeless tobacco: Never Used   Substance and Sexual Activity    Alcohol use: Not Currently     Comment: occasionally     Drug use: No    Sexual activity: Yes     Partners: Female     Birth control/protection: None   Lifestyle    Physical activity:     Days per week: Not on file     Minutes per session: Not on file    Stress: Not on file   Relationships    Social connections:     Talks on phone: Not on file     Gets together: Not on file     Attends [employer] service: Not on file     Active member of club or organization: Not on file     Attends meetings of clubs or organizations: Not on file     Relationship status: Not on file   Other Topics Concern    Not on file   Social History Narrative    Disabled. The patient is the youngest of 6 siblings. Single. Lives with single-sex partner.                  Review of  patient's allergies indicates:  No Known Allergies    Medication List with Changes/Refills   Current Medications    ALBUTEROL (VENTOLIN HFA) 90 MCG/ACTUATION INHALER    INHALE TWO PUFFS INTO LUNGS EVERY 4 TO 6 HOURS AS NEEDED FOR SHORTNESS OF BREATH AND FOR WHEEZING    ALLOPURINOL (ZYLOPRIM) 300 MG TABLET    Take 1 tablet (300 mg total) by mouth once daily.    ATORVASTATIN (LIPITOR) 20 MG TABLET    Take 1 tablet (20 mg total) by mouth once daily.    B COMPLEX VITAMINS TABLET    Take 1 tablet by mouth every other day.     CALCIUM CITRATE/VITAMIN D2 (ISIAH-CITRATE ORAL)    Take 500 mg by mouth 2 (two) times daily.    CYANOCOBALAMIN (VITAMIN B-12) 1000 MCG TABLET    Take 100 mcg by mouth every morning.    FLUOXETINE (PROZAC) 20 MG/5 ML (4 MG/ML) SOLUTION    Take 10 mLs (40 mg total) by mouth once daily.    FLUTICASONE (FLONASE) 50 MCG/ACTUATION NASAL SPRAY    1 spray by Each Nare route 2 (two) times daily as needed for Rhinitis.    LEVOFLOXACIN (LEVAQUIN) 250 MG/10 ML SOLN    Take 20 mLs (500 mg total) by mouth once daily.    LEVOFLOXACIN (LEVAQUIN) 500 MG TABLET    Take 1 tablet (500 mg total) by mouth once daily.    MULTIVIT-IRON-MIN-FOLIC ACID 3,500-18-0.4 UNIT-MG-MG ORAL CHEW    Take 1 tablet by mouth 2 (two) times daily.     OMEPRAZOLE (PRILOSEC) 20 MG CAPSULE    Take 1 capsule (20 mg total) by mouth every morning.    ONDANSETRON (ZOFRAN) 4 MG TABLET    Take 1 tablet (4 mg total) by mouth every 6 (six) hours as needed.    OXYCODONE-ACETAMINOPHEN (PERCOCET)  MG PER TABLET    Take 1 tablet by mouth every 8 (eight) hours as needed for Pain. Greater than 7 day quantity medically necessary    VITAMIN D 185 MG TAB    Take 5,000 mg by mouth every morning.          REVIEW OF SYSTEMS:    GENERAL:  Recent weight loss.  RESPIRATORY:  Negative for cough, wheezing or shortness of breath, patient denies any recent URI.  CARDIOVASCULAR:  Negative for chest pain, leg swelling or palpitations.  GI:  Negative for abdominal  "discomfort, blood in stools or black stools or change in bowel habits, occasional constipation.  MUSCULOSKELETAL:  See HPI.  SKIN:  Negative for lesions, rash, and itching.  PSYCH:  No mood disorder or recent psychosocial stressors.  Patient's sleep is disturbed secondary to pain (patient reports also that she has insomnia).  HEMATOLOGY/LYMPHOLOGY:  Negative for prolonged bleeding, bruising easily or swollen nodes.  81 mg aspirin  ENDO: Patient has a history of diabetes  NEURO:   No history of headaches, syncope, paralysis, seizures or tremors.  All other reviewed and negative other than HPI.    OBJECTIVE:    /79   Pulse 86   Temp 97.7 °F (36.5 °C)   Resp 18   Ht 5' 5" (1.651 m)   Wt 125 kg (275 lb 9.2 oz)   LMP 06/26/2018   SpO2 100%   BMI 45.86 kg/m²     PHYSICAL EXAMINATION:    GENERAL: Well appearing, in no acute distress, alert and oriented x3.   PSYCH:  Mood and affect appropriate.  SKIN: Skin color, texture, turgor normal, no rashes or lesions.  HEAD/FACE:  Normocephalic, atraumatic.   CV: RRR with palpation of the radial artery.  PULM: No evidence of respiratory difficulty, symmetric chest rise.  BACK: Negative SLR bilaterally. There is pain to palpation over the lumbarparaspinals bilaterally.  Limited ROM with pain on extension.  Positive facet loading bilaterally,  EXTREMITIES: Pain with palpation to bilateral SI joints.  JENNA is negative bilaterally. Well-healed midline scars to bilateral knees.  Painful extension and flexion of bilateral knees. Medial and lateral joint line tenderness to bilateral knees.  MUSCULOSKELETAL: Bilateral upper and lower extremity strength is normal and symmetric.  No atrophy or tone abnormalities are noted.  NEURO: Bilateral lower extremity coordination and muscle stretch reflexes are physiologic and symmetric.  Plantar response are downgoing. No clonus.  No loss of sensation is noted.  GAIT: Antalgic- ambulates without assistance.      Assessment:     "   Encounter Diagnoses   Name Primary?    Chronic pain syndrome Yes    Encounter for long-term opiate analgesic use     Lumbar spondylosis     Bilateral chronic knee pain          Plan:       - Previous imaging was reviewed and discussed with the patient today.     - She had benefit with recent lumbar RFAs.    - Schedule for right genicular blocks in office under ultrasound.  Consider cooled RFA if only short benefit.  Also consider for left knee.    - Continue oxycodone-acetaminophen 10/325 mg one tablet every 8 hours PRN pain, #90, 0 refills.  Will send for 2/11/20.  She can call for 2 additional refills.    - The patient is here today for a refill of current pain medications and they believe these provide effective pain control and improvements in quality of life.  The patient notes no serious side effects, and feels the benefits outweigh the risks.  The patient was reminded of the pain contract that they signed previously as well as the risks and benefits of the medication including possible death.  The updated [place] Board of Pharmacy prescription monitoring program was reviewed, and the patient has been found to be compliant with current treatment plan.     - UDS reviewed.    - Continue topical pain cream PRN up to 5x/day.  This has been helpful for her knees.    - RTC 2 weeks after injection.    - Dr. Wagner evaluated the patient and agrees with this plan.      The above plan and management options were discussed at length with patient. Patient is in agreement with the above and verbalized understanding.     Virginia Sanchez, EMILY  01/31/2020

## 2020-01-31 NOTE — LETTER
January 31, 2020      Theodore Swan MD  [address]  [facility] [address]           [facility] [address] [facility] [address]  [place] [address]  Phone: [phone]  Fax: [phone]          Patient: Alivia Thomas   MR Number: [id number]   YOB: 1965   Date of Visit: 1/31/2020       Dear Dr. Theodore Swan:    Thank you for referring Alivia Thomas to me for evaluation. Attached you will find relevant portions of my assessment and plan of care.    If you have questions, please do not hesitate to call me. I look forward to following Alivia Thomas along with you.    Sincerely,    Virginia Sanchez, [facility]    Enclosure  CC:  No Recipients    If you would like to receive this communication electronically, please contact [email or web address] or [phone] to request more information on "[employer] Link access.    For providers and/or their staff who would like to refer a patient to [facility], please contact us through our one-stop-shop provider referral line, [facility] [place], at [phone].    If you feel you have received this communication in error or would no longer like to receive these types of communications, please e-mail [email or web address]
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
## 2020-02-21 NOTE — TELEPHONE ENCOUNTER
----- Message from Imer Vidal sent at 11/25/2019 10:05 AM CST -----  Contact: Self [phone]  Patient is calling to follow on meds, please advise    moderate assist (50% patients effort)

## 2020-02-28 ENCOUNTER — TELEPHONE (OUTPATIENT)
Dept: PAIN MEDICINE | Facility: CLINIC | Age: 55
End: 2020-02-28

## 2020-02-28 NOTE — TELEPHONE ENCOUNTER
Contact and left a message on voice mail for patient to call the office and reschedule appointment.    Per Virginia Sanchez, [facility] patient can skip if she wants to schedule for next month.

## 2020-03-02 ENCOUNTER — PROCEDURE VISIT (OUTPATIENT)
Dept: PAIN MEDICINE | Facility: CLINIC | Age: 55
End: 2020-03-02
Attending: ANESTHESIOLOGY
Payer: MEDICARE

## 2020-03-02 VITALS
HEIGHT: 65 IN | TEMPERATURE: 98 F | DIASTOLIC BLOOD PRESSURE: 82 MMHG | HEART RATE: 75 BPM | OXYGEN SATURATION: 100 % | WEIGHT: 272.25 LBS | BODY MASS INDEX: 45.36 KG/M2 | RESPIRATION RATE: 16 BRPM | SYSTOLIC BLOOD PRESSURE: 136 MMHG

## 2020-03-02 DIAGNOSIS — G89.29 CHRONIC PAIN OF RIGHT KNEE: ICD-10-CM

## 2020-03-02 DIAGNOSIS — M25.561 CHRONIC PAIN OF RIGHT KNEE: ICD-10-CM

## 2020-03-02 DIAGNOSIS — M25.561 CHRONIC PAIN OF BOTH KNEES: Primary | ICD-10-CM

## 2020-03-02 DIAGNOSIS — G89.29 CHRONIC PAIN OF BOTH KNEES: Primary | ICD-10-CM

## 2020-03-02 DIAGNOSIS — Z96.653 STATUS POST TOTAL BILATERAL KNEE REPLACEMENT: ICD-10-CM

## 2020-03-02 DIAGNOSIS — M25.562 CHRONIC PAIN OF BOTH KNEES: Primary | ICD-10-CM

## 2020-03-02 PROCEDURE — 64454 NJX AA&/STRD GNCLR NRV BRNCH: CPT | Mod: S$PBB,RT,, | Performed by: ANESTHESIOLOGY

## 2020-03-02 PROCEDURE — 96372 THER/PROPH/DIAG INJ SC/IM: CPT | Mod: PBBFAC

## 2020-03-02 PROCEDURE — 99499 NO LOS: ICD-10-PCS | Mod: S$PBB,,, | Performed by: ANESTHESIOLOGY

## 2020-03-02 PROCEDURE — 64454 PR NERVE BLOCK INJ, ANES/STEROID, GENICULAR NERVE, W/IMG: ICD-10-PCS | Mod: S$PBB,RT,, | Performed by: ANESTHESIOLOGY

## 2020-03-02 PROCEDURE — 64454 NJX AA&/STRD GNCLR NRV BRNCH: CPT | Mod: PBBFAC,RT | Performed by: ANESTHESIOLOGY

## 2020-03-02 PROCEDURE — 64450 NJX AA&/STRD OTHER PN/BRANCH: CPT | Mod: PBBFAC | Performed by: ANESTHESIOLOGY

## 2020-03-02 PROCEDURE — 76942 ECHO GUIDE FOR BIOPSY: CPT | Mod: PBBFAC | Performed by: ANESTHESIOLOGY

## 2020-03-02 PROCEDURE — 99499 UNLISTED E&M SERVICE: CPT | Mod: S$PBB,,, | Performed by: ANESTHESIOLOGY

## 2020-03-02 RX ORDER — METHYLPREDNISOLONE ACETATE 40 MG/ML
40 INJECTION, SUSPENSION INTRA-ARTICULAR; INTRALESIONAL; INTRAMUSCULAR; SOFT TISSUE
Status: COMPLETED | OUTPATIENT
Start: 2020-03-02 | End: 2020-03-02

## 2020-03-02 RX ORDER — BUPIVACAINE HYDROCHLORIDE 2.5 MG/ML
9 INJECTION, SOLUTION EPIDURAL; INFILTRATION; INTRACAUDAL
Status: COMPLETED | OUTPATIENT
Start: 2020-03-02 | End: 2020-03-02

## 2020-03-02 RX ADMIN — METHYLPREDNISOLONE ACETATE 40 MG: 40 INJECTION, SUSPENSION INTRA-ARTICULAR; INTRALESIONAL; INTRAMUSCULAR; SOFT TISSUE at 08:03

## 2020-03-02 RX ADMIN — BUPIVACAINE HYDROCHLORIDE 22.5 MG: 2.5 INJECTION, SOLUTION EPIDURAL; INFILTRATION; INTRACAUDAL; PERINEURAL at 08:03

## 2020-03-02 NOTE — PROGRESS NOTES
HPI   Patient presenting for right genicular nerve block under U/S      Patient on Anti-coagulation No    No health changes since previous encounter    Past Medical History:   Diagnosis Date    Allergy     Anemia     Asthma     Bilateral shoulder bursitis     Cervical stenosis of spine     Chronic pain     DDD (degenerative disc disease), cervical     Depression 1/28/2019    Diabetes mellitus type II     DJD (degenerative joint disease) of knee 6/19/2014    Facet arthritis of lumbar region 12/17/2015    Facet syndrome 12/17/2015    GERD (gastroesophageal reflux disease)     Heartburn     Hyperlipidemia     Hypertension     Morbid obesity     Neuromuscular disorder     PADDY (obstructive sleep apnea)     Proteinuria     Right carpal tunnel syndrome     Sacroiliitis 6/13/2018    Sacroiliitis     Sleep apnea      Past Surgical History:   Procedure Laterality Date    CARPAL TUNNEL RELEASE      CARPAL TUNNEL RELEASE  1980s    left    CARPAL TUNNEL RELEASE  2012    right    ESOPHAGOGASTRODUODENOSCOPY N/A 3/27/2019    Procedure: EGD (ESOPHAGOGASTRODUODENOSCOPY);  Surgeon: Robbin Bar MD;  Location: [address]);  Service: Endoscopy;  Laterality: N/A;  BMI 61.3/2nd floor case/svn    JOINT REPLACEMENT Bilateral     with 2 revisions on rt    KNEE SURGERY  3/2010    orthroscope    KNEE SURGERY  6-19-14    left TKR    LAPAROSCOPIC SLEEVE GASTRECTOMY N/A 8/28/2019    Procedure: GASTRECTOMY, SLEEVE, LAPAROSCOPIC with intraop EGD;  Surgeon: Heriberto Clements MD;  Location: [facility] OR [address] [place] [address];  Service: General;  Laterality: N/A;    RADIOFREQUENCY ABLATION Right 7/9/2019    Procedure: RADIOFREQUENCY ABLATION;  Surgeon: Lina Wagner MD;  Location: [facility] PAIN MGT;  Service: Pain Management;  Laterality: Right;  RIGHT RFA L2,3,4,5  2 of 2    RADIOFREQUENCY ABLATION Left 12/19/2019    Procedure: RADIOFREQUENCY ABLATION, LEFT L2-L3-L4-L5 MEDIAL BRANCH 1 OF 2;  Surgeon: Lina QUICK  MD Kristy;  Location: [facility] PAIN MGT;  Service: Pain Management;  Laterality: Left;    RADIOFREQUENCY ABLATION Right 12/31/2019    Procedure: RADIOFREQUENCY ABLATION, RIGHT L2-L3-L4-L5  2 OF 2;  Surgeon: Lina Wagner MD;  Location: [facility] PAIN MGT;  Service: Pain Management;  Laterality: Right;    RADIOFREQUENCY ABLATION OF LUMBAR MEDIAL BRANCH NERVE AT SINGLE LEVEL Left 6/26/2018    Procedure: RADIOFREQUENCY ABLATION, NERVE, MEDIAL BRANCH, LUMBAR, 1 LEVEL;  Surgeon: Lina Wagner MD;  Location: [facility] PAIN MGT;  Service: Pain Management;  Laterality: Left;  Left Cooled RFA @ L2,3,4,5  [id number]  with Sedation    1 of 2    RADIOFREQUENCY ABLATION OF LUMBAR MEDIAL BRANCH NERVE AT SINGLE LEVEL Right 7/10/2018    Procedure: RADIOFREQUENCY ABLATION, NERVE, MEDIAL BRANCH, LUMBAR, 1 LEVEL;  Surgeon: Lina Wagner MD;  Location: [facility] PAIN MGT;  Service: Pain Management;  Laterality: Right;  RIght Cooled RFA @ L2,3,4,5  [id number] with Sedation    2 of 2    TRIGGER FINGER RELEASE Right 2017    TRIGGER FINGER RELEASE Left 7/29/2019    Procedure: RELEASE, TRIGGER FINGER, Left Thumb;  Surgeon: Velma Garcia MD;  Location: [facility] OR;  Service: Orthopedics;  Laterality: Left;  Local w/ MAC     Review of patient's allergies indicates:   Allergen Reactions    Strawberry Anaphylaxis      Current Outpatient Medications   Medication Sig    albuterol (VENTOLIN HFA) 90 mcg/actuation inhaler INHALE TWO PUFFS INTO LUNGS EVERY 4 TO 6 HOURS AS NEEDED FOR SHORTNESS OF BREATH AND FOR WHEEZING    allopurinol (ZYLOPRIM) 300 MG tablet Take 1 tablet (300 mg total) by mouth once daily.    atorvastatin (LIPITOR) 20 MG tablet Take 1 tablet (20 mg total) by mouth once daily.    b complex vitamins tablet Take 1 tablet by mouth every other day.     calcium citrate/vitamin D2 (ISIAH-CITRATE ORAL) Take 500 mg by mouth 2 (two) times daily.    cyanocobalamin (VITAMIN B-12) 1000 MCG tablet Take 100 mcg by mouth every  "morning.    FLUoxetine (PROZAC) 20 mg/5 mL (4 mg/mL) solution Take 10 mLs (40 mg total) by mouth once daily.    fluticasone (FLONASE) 50 mcg/actuation nasal spray 1 spray by Each Nare route 2 (two) times daily as needed for Rhinitis.    omeprazole (PRILOSEC) 20 MG capsule Take 1 capsule (20 mg total) by mouth every morning.    oxyCODONE-acetaminophen (PERCOCET)  mg per tablet Take 1 tablet by mouth every 8 (eight) hours as needed for Pain. Greater than 7 day quantity medically necessary    vitamin D 185 MG Tab Take 5,000 mg by mouth every morning.     levoFLOXacin (LEVAQUIN) 250 mg/10 mL Soln Take 20 mLs (500 mg total) by mouth once daily.    levoFLOXacin (LEVAQUIN) 500 MG tablet Take 1 tablet (500 mg total) by mouth once daily.    MULTIVIT-IRON-MIN-FOLIC ACID 3,500-18-0.4 UNIT-MG-MG ORAL CHEW Take 1 tablet by mouth 2 (two) times daily.     ondansetron (ZOFRAN) 4 MG tablet Take 1 tablet (4 mg total) by mouth every 6 (six) hours as needed.     No current facility-administered medications for this visit.        PMHx, PSHx, Allergies, Medications reviewed in epic    ROS negative except pain complaints in HPI    OBJECTIVE:    /82   Pulse 75   Temp 97.9 °F (36.6 °C)   Resp 16   Ht 5' 5" (1.651 m)   Wt 123.5 kg (272 lb 4.3 oz)   LMP 06/26/2018   SpO2 100%   BMI 45.31 kg/m²     PHYSICAL EXAMINATION:    GENERAL: Well appearing, in no acute distress, alert and oriented x3.  PSYCH:  Mood and affect appropriate.  SKIN: Skin color, texture, turgor normal, no rashes or lesions which will impact the procedure.  CV: RRR with palpation of the radial artery.  PULM: No evidence of respiratory difficulty, symmetric chest rise. Clear to auscultation.  NEURO: Cranial nerves grossly intact.    Lab Results   Component Value Date    HGBA1C 6.6 (H) 01/02/2020     CMP  Sodium   Date Value Ref Range Status   01/02/2020 141 136 - 145 mmol/L Final     Potassium   Date Value Ref Range Status   01/02/2020 4.5 3.5 - " 5.1 mmol/L Final     Chloride   Date Value Ref Range Status   01/02/2020 105 95 - 110 mmol/L Final     CO2   Date Value Ref Range Status   01/02/2020 27 23 - 29 mmol/L Final     Glucose   Date Value Ref Range Status   01/02/2020 110 70 - 110 mg/dL Final     BUN, Bld   Date Value Ref Range Status   01/02/2020 8 6 - 20 mg/dL Final     Creatinine   Date Value Ref Range Status   01/02/2020 0.7 0.5 - 1.4 mg/dL Final     Calcium   Date Value Ref Range Status   01/02/2020 9.9 8.7 - 10.5 mg/dL Final     Total Protein   Date Value Ref Range Status   01/02/2020 7.0 6.0 - 8.4 g/dL Final     Albumin   Date Value Ref Range Status   01/02/2020 3.3 (L) 3.5 - 5.2 g/dL Final     Total Bilirubin   Date Value Ref Range Status   01/02/2020 0.7 0.1 - 1.0 mg/dL Final     Comment:     For infants and newborns, interpretation of results should be based  on gestational age, weight and in agreement with clinical  observations.  Premature Infant recommended reference ranges:  Up to 24 hours.............<8.0 mg/dL  Up to 48 hours............<12.0 mg/dL  3-5 days..................<15.0 mg/dL  6-29 days.................<15.0 mg/dL       Alkaline Phosphatase   Date Value Ref Range Status   01/02/2020 51 (L) 55 - 135 U/L Final     AST   Date Value Ref Range Status   01/02/2020 18 10 - 40 U/L Final     ALT   Date Value Ref Range Status   01/02/2020 14 10 - 44 U/L Final     Anion Gap   Date Value Ref Range Status   01/02/2020 9 8 - 16 mmol/L Final     eGFR if    Date Value Ref Range Status   01/02/2020 >60.0 >60 mL/min/1.73 m^2 Final     eGFR if non    Date Value Ref Range Status   01/02/2020 >60.0 >60 mL/min/1.73 m^2 Final     Comment:     Calculation used to obtain the estimated glomerular filtration  rate (eGFR) is the CKD-EPI equation.        Lab Results   Component Value Date    WBC 6.58 11/14/2019    HGB 12.4 11/14/2019    HCT 40.4 11/14/2019    MCV 97 11/14/2019     11/14/2019         Plan:    Procedure  Note: Right Geniculate nerve block under ultrasound     Surgeon: Lina Wagner M.D.     Assistant: None     Pre-Op Diagnosis: Knee pain     Post-Op Diagnosis: Same     EBL: None     Complications: None     Specimens: None     Description of procedure:   After written consent was obtained, patient placed in supine position and a time out was performed. The area over the medial and lateral aspect of the superior epi-condyle of the femur and the medial tibial metaphysis were prepped with chlorhexidine. A sterile ultrasound cover was applied, and the probe was placed over these 3 areas in order to identify the medial aspect of the bone at these 3 separate areas. A 22 gauge needle was then advanced out of plane under ultrasound until os was contacted, and then the needle was withdrawn approximately 1 mm and after negative aspiration and no paresthesias there was a mixture of 9 mL of 0.25% bupivacaine +1 mL of 40 mg per mL Depo-Medrol prepared. 3 mL of the mixture was injected into each of these 3 areas for a total volume of 9mL. Dispersion of the medication into the area of the nerve being visualized directly using the ultrasound probe, confirming that the medication was deposited in the correct area. Needle was withdrawn and a sterile band-aid applied to the skin.     Patient tolerated the procedure well, and was reporting improvement of pain symptoms after the injection. She was discharged from the clinic in stable condition.     F/u in 2 weeks with NP    Will consider left side in the future.    F/u with Dr. Sosa Wagner  03/02/2020

## 2020-03-03 ENCOUNTER — TELEPHONE (OUTPATIENT)
Dept: ADMINISTRATIVE | Facility: OTHER | Age: 55
End: 2020-03-03

## 2020-03-03 ENCOUNTER — PATIENT OUTREACH (OUTPATIENT)
Dept: ADMINISTRATIVE | Facility: OTHER | Age: 55
End: 2020-03-03

## 2020-03-03 ENCOUNTER — TELEPHONE (OUTPATIENT)
Dept: PAIN MEDICINE | Facility: CLINIC | Age: 55
End: 2020-03-03

## 2020-03-03 DIAGNOSIS — E11.65 UNCONTROLLED TYPE 2 DIABETES MELLITUS WITH HYPERGLYCEMIA: Primary | ICD-10-CM

## 2020-03-03 NOTE — TELEPHONE ENCOUNTER
MD Nathaly Bahena MA   Caller: Unspecified (Today, 10:11 AM)             Schedule the patient for a left genicular nerve block under ultrasound please.     Thanks     jaun      Pt scheduled for 3/23/20for 8:30        Pt follow up for 3/24/20 cancelled

## 2020-03-03 NOTE — PROGRESS NOTES
Chart reviewed.   Immunizations: updated  Orders placed: Diabetic eye cam  Upcoming appts to satisfy DANIEL topics: n/a

## 2020-03-03 NOTE — TELEPHONE ENCOUNTER
----- Message from Sim Barrera sent at 3/3/2020  9:29 AM CST -----  Contact: DONTAE MOON [[id number]]  Name of Who is Calling: DONTAE MOON [[id number]]      What is the request in detail: Pt is requesting a call back from clinical team in regard to Nerve block. Pt states that the block did help  Please contact to further discuss and advise.          Can the clinic reply by [facility]:       What [facility]: [phone]

## 2020-03-03 NOTE — TELEPHONE ENCOUNTER
Staff returned call to pt    Pt stated the block procedure help on right knee and wanted to notify  as he stated if it helped right he will order for left to be done    Staff informed pt that we will notify  and follow up upon his response

## 2020-03-04 ENCOUNTER — OFFICE VISIT (OUTPATIENT)
Dept: ORTHOPEDICS | Facility: CLINIC | Age: 55
End: 2020-03-04
Payer: MEDICARE

## 2020-03-04 ENCOUNTER — LAB VISIT (OUTPATIENT)
Dept: LAB | Facility: HOSPITAL | Age: 55
End: 2020-03-04
Payer: MEDICARE

## 2020-03-04 ENCOUNTER — TELEPHONE (OUTPATIENT)
Dept: PAIN MEDICINE | Facility: CLINIC | Age: 55
End: 2020-03-04

## 2020-03-04 VITALS — WEIGHT: 272.63 LBS | BODY MASS INDEX: 45.42 KG/M2 | HEIGHT: 65 IN

## 2020-03-04 DIAGNOSIS — M25.561 CHRONIC PAIN OF BOTH KNEES: ICD-10-CM

## 2020-03-04 DIAGNOSIS — Z96.653 STATUS POST TOTAL BILATERAL KNEE REPLACEMENT: Primary | ICD-10-CM

## 2020-03-04 DIAGNOSIS — E78.5 HYPERLIPIDEMIA, UNSPECIFIED HYPERLIPIDEMIA TYPE: ICD-10-CM

## 2020-03-04 DIAGNOSIS — M25.562 CHRONIC PAIN OF BOTH KNEES: ICD-10-CM

## 2020-03-04 DIAGNOSIS — K21.9 GASTROESOPHAGEAL REFLUX DISEASE, ESOPHAGITIS PRESENCE NOT SPECIFIED: ICD-10-CM

## 2020-03-04 DIAGNOSIS — E66.01 MORBID OBESITY: ICD-10-CM

## 2020-03-04 DIAGNOSIS — M25.562 BILATERAL CHRONIC KNEE PAIN: ICD-10-CM

## 2020-03-04 DIAGNOSIS — M25.561 BILATERAL CHRONIC KNEE PAIN: ICD-10-CM

## 2020-03-04 DIAGNOSIS — M25.561 CHRONIC PAIN OF BOTH KNEES: Primary | ICD-10-CM

## 2020-03-04 DIAGNOSIS — G89.29 BILATERAL CHRONIC KNEE PAIN: ICD-10-CM

## 2020-03-04 DIAGNOSIS — G89.29 CHRONIC PAIN OF BOTH KNEES: ICD-10-CM

## 2020-03-04 DIAGNOSIS — D53.1 COMBINED B12 AND FOLATE DEFICIENCY ANEMIA: ICD-10-CM

## 2020-03-04 DIAGNOSIS — G89.29 CHRONIC PAIN OF BOTH KNEES: Primary | ICD-10-CM

## 2020-03-04 DIAGNOSIS — E11.65 UNCONTROLLED TYPE 2 DIABETES MELLITUS WITH HYPERGLYCEMIA: ICD-10-CM

## 2020-03-04 DIAGNOSIS — M25.562 CHRONIC PAIN OF BOTH KNEES: Primary | ICD-10-CM

## 2020-03-04 DIAGNOSIS — F11.90 CHRONIC, CONTINUOUS USE OF OPIOIDS: ICD-10-CM

## 2020-03-04 DIAGNOSIS — G47.33 OSA ON CPAP: ICD-10-CM

## 2020-03-04 LAB
25(OH)D3+25(OH)D2 SERPL-MCNC: 37 NG/ML (ref 30–96)
ALBUMIN SERPL BCP-MCNC: 3.5 G/DL (ref 3.5–5.2)
ALP SERPL-CCNC: 57 U/L (ref 55–135)
ALT SERPL W/O P-5'-P-CCNC: 12 U/L (ref 10–44)
ANION GAP SERPL CALC-SCNC: 5 MMOL/L (ref 8–16)
AST SERPL-CCNC: 15 U/L (ref 10–40)
BASOPHILS # BLD AUTO: 0.04 K/UL (ref 0–0.2)
BASOPHILS NFR BLD: 0.8 % (ref 0–1.9)
BILIRUB SERPL-MCNC: 0.6 MG/DL (ref 0.1–1)
BUN SERPL-MCNC: 11 MG/DL (ref 6–20)
CALCIUM SERPL-MCNC: 9.4 MG/DL (ref 8.7–10.5)
CHLORIDE SERPL-SCNC: 108 MMOL/L (ref 95–110)
CHOLEST SERPL-MCNC: 152 MG/DL (ref 120–199)
CHOLEST/HDLC SERPL: 3 {RATIO} (ref 2–5)
CO2 SERPL-SCNC: 30 MMOL/L (ref 23–29)
CREAT SERPL-MCNC: 0.8 MG/DL (ref 0.5–1.4)
DIFFERENTIAL METHOD: ABNORMAL
EOSINOPHIL # BLD AUTO: 0.2 K/UL (ref 0–0.5)
EOSINOPHIL NFR BLD: 3.7 % (ref 0–8)
ERYTHROCYTE [DISTWIDTH] IN BLOOD BY AUTOMATED COUNT: 14.9 % (ref 11.5–14.5)
EST. GFR  (AFRICAN AMERICAN): >60 ML/MIN/1.73 M^2
EST. GFR  (NON AFRICAN AMERICAN): >60 ML/MIN/1.73 M^2
GLUCOSE SERPL-MCNC: 114 MG/DL (ref 70–110)
HCT VFR BLD AUTO: 39.3 % (ref 37–48.5)
HDLC SERPL-MCNC: 51 MG/DL (ref 40–75)
HDLC SERPL: 33.6 % (ref 20–50)
HGB BLD-MCNC: 12.2 G/DL (ref 12–16)
IMM GRANULOCYTES # BLD AUTO: 0.01 K/UL (ref 0–0.04)
IMM GRANULOCYTES NFR BLD AUTO: 0.2 % (ref 0–0.5)
IRON SERPL-MCNC: 66 UG/DL (ref 30–160)
LDLC SERPL CALC-MCNC: 88.6 MG/DL (ref 63–159)
LYMPHOCYTES # BLD AUTO: 2.2 K/UL (ref 1–4.8)
LYMPHOCYTES NFR BLD: 45 % (ref 18–48)
MCH RBC QN AUTO: 30.2 PG (ref 27–31)
MCHC RBC AUTO-ENTMCNC: 31 G/DL (ref 32–36)
MCV RBC AUTO: 97 FL (ref 82–98)
MONOCYTES # BLD AUTO: 0.4 K/UL (ref 0.3–1)
MONOCYTES NFR BLD: 7.5 % (ref 4–15)
NEUTROPHILS # BLD AUTO: 2.1 K/UL (ref 1.8–7.7)
NEUTROPHILS NFR BLD: 42.8 % (ref 38–73)
NONHDLC SERPL-MCNC: 101 MG/DL
NRBC BLD-RTO: 0 /100 WBC
PLATELET # BLD AUTO: 278 K/UL (ref 150–350)
PMV BLD AUTO: 11 FL (ref 9.2–12.9)
POTASSIUM SERPL-SCNC: 4.3 MMOL/L (ref 3.5–5.1)
PROT SERPL-MCNC: 7.3 G/DL (ref 6–8.4)
RBC # BLD AUTO: 4.04 M/UL (ref 4–5.4)
SATURATED IRON: 19 % (ref 20–50)
SODIUM SERPL-SCNC: 143 MMOL/L (ref 136–145)
TOTAL IRON BINDING CAPACITY: 346 UG/DL (ref 250–450)
TRANSFERRIN SERPL-MCNC: 234 MG/DL (ref 200–375)
TRIGL SERPL-MCNC: 62 MG/DL (ref 30–150)
VIT B12 SERPL-MCNC: >2000 PG/ML (ref 210–950)
WBC # BLD AUTO: 4.82 K/UL (ref 3.9–12.7)

## 2020-03-04 PROCEDURE — 99213 PR OFFICE/OUTPT VISIT, EST, LEVL III, 20-29 MIN: ICD-10-PCS | Mod: S$PBB,,, | Performed by: ORTHOPAEDIC SURGERY

## 2020-03-04 PROCEDURE — 99213 OFFICE O/P EST LOW 20 MIN: CPT | Mod: PBBFAC | Performed by: ORTHOPAEDIC SURGERY

## 2020-03-04 PROCEDURE — 99999 PR PBB SHADOW E&M-EST. PATIENT-LVL III: ICD-10-PCS | Mod: PBBFAC,,, | Performed by: ORTHOPAEDIC SURGERY

## 2020-03-04 PROCEDURE — 82306 VITAMIN D 25 HYDROXY: CPT

## 2020-03-04 PROCEDURE — 80053 COMPREHEN METABOLIC PANEL: CPT

## 2020-03-04 PROCEDURE — 84425 ASSAY OF VITAMIN B-1: CPT

## 2020-03-04 PROCEDURE — 85025 COMPLETE CBC W/AUTO DIFF WBC: CPT

## 2020-03-04 PROCEDURE — 99999 PR PBB SHADOW E&M-EST. PATIENT-LVL III: CPT | Mod: PBBFAC,,, | Performed by: ORTHOPAEDIC SURGERY

## 2020-03-04 PROCEDURE — 99213 OFFICE O/P EST LOW 20 MIN: CPT | Mod: S$PBB,,, | Performed by: ORTHOPAEDIC SURGERY

## 2020-03-04 PROCEDURE — 83540 ASSAY OF IRON: CPT

## 2020-03-04 PROCEDURE — 82607 VITAMIN B-12: CPT

## 2020-03-04 PROCEDURE — 80061 LIPID PANEL: CPT

## 2020-03-04 PROCEDURE — 36415 COLL VENOUS BLD VENIPUNCTURE: CPT

## 2020-03-04 NOTE — PROGRESS NOTES
"Subjective:      Patient ID: Alivia Thomas is a 54 y.o. female.    Chief Complaint: Follow-up of the Left Knee and Follow-up of the Right Knee    HPI  Alivia Thomas has bilateral knee pain.  The right kne pain has improved with the RFA.  Left scheduled. No change in left knee pain. The pain is located in the anterior aspect of the knee.  There  is not radiation.   There is  associated stiffness.   There is not catching and locking. The pain is described as achy. The pain is aggravated by activity.  It is alleviated by rest.  There is not associated back pain.  Her history, medications and problem list were reviewed.    Review of Systems   Constitution: Negative for chills, fever and night sweats.   HENT: Negative for hearing loss.    Eyes: Negative for blurred vision and double vision.   Cardiovascular: Negative for chest pain, claudication and leg swelling.   Respiratory: Negative for shortness of breath.    Endocrine: Negative for polydipsia, polyphagia and polyuria.   Hematologic/Lymphatic: Negative for adenopathy and bleeding problem. Does not bruise/bleed easily.   Skin: Negative for poor wound healing.   Musculoskeletal: Positive for joint pain.   Gastrointestinal: Negative for diarrhea and heartburn.   Genitourinary: Negative for bladder incontinence.   Neurological: Negative for focal weakness, headaches, numbness, paresthesias and sensory change.   Psychiatric/Behavioral: The patient is not nervous/anxious.    Allergic/Immunologic: Negative for persistent infections.         Objective:      Body mass index is 45.36 kg/m².  Vitals:    03/04/20 0823   Weight: 123.6 kg (272 lb 9.6 oz)   Height: 5' 5" (1.651 m)           General    Constitutional: She is oriented to person, place, and time. She appears well-developed and well-nourished.   obese   HENT:   Head: Normocephalic and atraumatic.   Eyes: EOM are normal.   Cardiovascular: Normal rate and regular rhythm.    Pulmonary/Chest: Effort normal. "   Neurological: She is alert and oriented to person, place, and time.   Psychiatric: She has a normal mood and affect. Her behavior is normal.           Right Knee Exam     Inspection   Erythema: absent  Scars: present  Swelling: absent  Effusion: absent  Deformity: absent  Bruising: absent    Tenderness   The patient is experiencing no tenderness.     Range of Motion   Extension: 0   Flexion: 110     Tests   Ligament Examination Lachman: normal (-1 to 2mm)   MCL - Valgus: normal (0 to 2mm)  LCL - Varus: normal  Patella   Passive Patellar Tilt: neutral    Other   Sensation: normal    Left Knee Exam     Inspection   Erythema: absent  Scars: present  Swelling: absent  Effusion: absent  Deformity: absent  Bruising: absent    Tenderness   The patient tender to palpation of the medial retinaculum and lateral retinaculum.    Range of Motion   Extension: 0   Flexion: 120     Tests   Stability Lachman: normal (-1 to 2mm)   MCL - Valgus: normal (0 to 2mm)  LCL - Varus: normal (0 to 2mm)  Patella   Passive Patellar Tilt: neutral    Other   Sensation: normal    Muscle Strength   Right Lower Extremity   Hip Abduction: 5/5   Quadriceps:  5/5   Hamstrin/5   Left Lower Extremity   Hip Abduction: 5/5   Quadriceps:  5/5   Hamstrin/5     Reflexes     Left Side  Quadriceps:  2+    Right Side   Quadriceps:  2+    Vascular Exam     Right Pulses  Dorsalis Pedis:      2+          Left Pulses  Dorsalis Pedis:      2+          Edema  Right Lower Leg: absent  Left Lower Leg: absent              Assessment:       Encounter Diagnosis   Name Primary?    Chronic pain of both knees Yes          Plan:       Alivia was seen today for follow-up and follow-up.    Diagnoses and all orders for this visit:    Chronic pain of both knees        SHe is scheduled for Left knee RFA.  WIll see her back in thre3 months with xrays

## 2020-03-05 ENCOUNTER — TELEPHONE (OUTPATIENT)
Dept: PODIATRY | Facility: CLINIC | Age: 55
End: 2020-03-05

## 2020-03-05 ENCOUNTER — CLINICAL SUPPORT (OUTPATIENT)
Dept: BARIATRICS | Facility: CLINIC | Age: 55
End: 2020-03-05
Payer: MEDICARE

## 2020-03-05 VITALS — BODY MASS INDEX: 45.12 KG/M2 | WEIGHT: 271.19 LBS

## 2020-03-05 DIAGNOSIS — E11.49 TYPE II DIABETES MELLITUS WITH NEUROLOGICAL MANIFESTATIONS: Primary | ICD-10-CM

## 2020-03-05 DIAGNOSIS — L84 CORNS AND CALLUS: ICD-10-CM

## 2020-03-05 DIAGNOSIS — Z71.3 NUTRITIONAL COUNSELING: ICD-10-CM

## 2020-03-05 DIAGNOSIS — E11.65 UNCONTROLLED TYPE 2 DIABETES MELLITUS WITH HYPERGLYCEMIA: ICD-10-CM

## 2020-03-05 DIAGNOSIS — Z98.84 S/P GASTRIC BYPASS: ICD-10-CM

## 2020-03-05 DIAGNOSIS — G47.33 OSA ON CPAP: ICD-10-CM

## 2020-03-05 DIAGNOSIS — E66.01 MORBID OBESITY WITH BODY MASS INDEX (BMI) OF 40.0 TO 49.9: ICD-10-CM

## 2020-03-05 PROCEDURE — 99211 OFF/OP EST MAY X REQ PHY/QHP: CPT | Mod: PBBFAC | Performed by: DIETITIAN, REGISTERED

## 2020-03-05 PROCEDURE — 99999 PR PBB SHADOW E&M-EST. PATIENT-LVL I: CPT | Mod: PBBFAC,,, | Performed by: DIETITIAN, REGISTERED

## 2020-03-05 PROCEDURE — 99499 NO LOS: ICD-10-PCS | Mod: S$PBB,,, | Performed by: DIETITIAN, REGISTERED

## 2020-03-05 PROCEDURE — 99999 PR PBB SHADOW E&M-EST. PATIENT-LVL I: ICD-10-PCS | Mod: PBBFAC,,, | Performed by: DIETITIAN, REGISTERED

## 2020-03-05 PROCEDURE — 97803 MED NUTRITION INDIV SUBSEQ: CPT | Mod: PBBFAC,59 | Performed by: DIETITIAN, REGISTERED

## 2020-03-05 PROCEDURE — 99499 UNLISTED E&M SERVICE: CPT | Mod: S$PBB,,, | Performed by: DIETITIAN, REGISTERED

## 2020-03-05 NOTE — TELEPHONE ENCOUNTER
----- Message from Juliette Carpenter sent at 3/4/2020  8:52 AM CST -----  Contact: self  Pt is requesting a order for Diabetic shoes. Asking for a call back.      contacr info [phone]

## 2020-03-05 NOTE — PATIENT INSTRUCTIONS
NUTRITION THERAPY for GOUT  With proper treatment, people who have gout do not usually progress to the chronic tophaceous phase of gout. What is the proper treatment of gout?  to help control gout and there are lifestyle recommendations. People with gout are advised to:  Avoid alcohol or drink alcohol in moderation  Drink plenty of water and other fluids  Maintain an ideal body weight   if overweight but avoid fasting or quick weight loss schemes    How to Treat Gout with Diet and other Modifications  What Should You Eat?  Dietary restrictions suggest what people should not eat, but what should people eat? What foods will help control gout attacks? The American Medical Association recommends the following dietary guidelines for people with gout, advising them to eat a diet:  high in complex carbohydrates (fiber-rich whole grains, fruits, and vegetables)  low in protein (15% of calories and sources should be soy, lean meats, or poultry)  no more than 30% of calories in fat (with only 10% animal fats)                   (below 50mg purine per 100 gms of edible portion)   Milk and milk products   Eggs   Cereals   Vegetables except those listed below   Fruits   Sugar and sweets ( must be restricted if the person is obese)   Fats and oils ( 1-2 tbsp. per day)   Nuts                   (50 - 500 mg purine per 100 gms of edible portion )    Vegetables like peas, beans, spinach, apple, mushrooms and cauliflower                  ( 500 - 1000 mg purine per 100 gms of edible portion)   Meat   Crab and Fish especially white bait, sardines, and herring.   Liver   Kidney   Heart   Pancreas

## 2020-03-05 NOTE — PROGRESS NOTES
NUTRITION NOTE    Referring Physician: Heriberto Clements M.D.  Reason for MNT Referral: Follow-up 6 months s/p Gastric Bypass    PAST MEDICAL HISTORY:    Denies nausea, vomiting and diarrhea.  Reports problems, including constipation using smooth tea with nuts.    Past Medical History:   Diagnosis Date    Allergy     Anemia     Asthma     Bilateral shoulder bursitis     Cervical stenosis of spine     Chronic pain     DDD (degenerative disc disease), cervical     Depression 1/28/2019    Diabetes mellitus type II     DJD (degenerative joint disease) of knee 6/19/2014    Facet arthritis of lumbar region 12/17/2015    Facet syndrome 12/17/2015    GERD (gastroesophageal reflux disease)     Heartburn     Hyperlipidemia     Hypertension     Morbid obesity     Neuromuscular disorder     PADDY (obstructive sleep apnea)     Proteinuria     Right carpal tunnel syndrome     Sacroiliitis 6/13/2018    Sacroiliitis     Sleep apnea        CLINICAL DATA:  54 y.o. female.    There were no vitals filed for this visit.    Current Weight:271 lbs  BMI: 45.12  Total Weight Loss: 74 lbs  Excess Weight Loss: 36% inadequate weight loss    LABS:  Reviewed.   Thiamin level not available    CURRENT DIET:  Bariatric Diet.  Diet Recall: 20-78  grams of protein/day; 48+ oz of fluids/day  B protein shake- premier protein or scrambled egg with regular weiss 12-30 gms of protein  L nuts or cashews with fruit or apple with cheese or cottage cheese and pineapple 4-15 gms protein  D skips or mustard greens and fish jiffy cornbread small amt 0-21 gms protein  S Peanut butter jelly on pistolette, cheezits and crackers and cheese or sugar free jello 0-12 gms of protein    Meal Pattern: 2-3 meal(s) + 1snack(s) + 0-2 protein supplement(s)  Inadequate protein supplement intake.  Adequate dairy intake.  Adequate vegetable intake. Tolerates raw vegetables and lettuce.  Adequate fruit intake.  Starchy CHO: cornbread, crackers, pistolette,  cheezits  Other:     EXERCISE:  None.  Gout flare up    Restrictions to Exercise: Gout    VITAMINS / MINERALS:  See BA    ASSESSMENT:  Doing poorly overall.  Weight loss.  Has lost 24 lbs since November 2019  Inadequate calorie intake.  Inadequate protein intake.  Adequate fluid intake.  Following diet inappropriately. Using regular weiss, eating starchy carbs, inadequate protein intake  Not exercising.  Inadequate vitamins & minerals.    BARIATRIC DIET DISCUSSION:  Instructed and provided written materials on bariatric diet plan.  Also low purine diet provided to pt due to having gout flare up  Reinforced post-op nutrition guidelines.    PLAN / RECOMMENDATIONS:  May begin to incorporate raw vegetables, lettuce, unsalted nuts, and seeds as tolerated.  May begin to swallow whole pills as tolerated.  Back on track with diet plan.  Adjust diet by adding additional protein shake, eliminate all starchy carbs.  Increase protein intake.  Increase fluid intake.  Begin exercise.as tolerated  Continue appropriate vitamins & minerals.  Adjust vitamins & minerals by take MVI twice a day, calcium citrate-500 mg with Vitamin D three times per day  Msg sent to Dr. Richardson's office regarding discussing low purine diet and amt of allopurinol pt is currently taking.    Return to clinic prn    SESSION TIME: 15 minutes

## 2020-03-05 NOTE — TELEPHONE ENCOUNTER
Pt requests an rx for Diabetic shoes. Pt last seen 12/30/19. I informed the pt that she may have to come in the office for an appointment however, I'd relay the message to Dr. Rowland. Pt voiced understanding and call ended.

## 2020-03-06 NOTE — TELEPHONE ENCOUNTER
Left voice message for pt to give the office a call back at [phone]. Called pt to see where she wanted her diabetic shoe rx

## 2020-03-09 DIAGNOSIS — R52 PAIN: Primary | ICD-10-CM

## 2020-03-09 DIAGNOSIS — M25.561 BILATERAL CHRONIC KNEE PAIN: ICD-10-CM

## 2020-03-09 DIAGNOSIS — M47.816 FACET ARTHRITIS OF LUMBAR REGION: ICD-10-CM

## 2020-03-09 DIAGNOSIS — Z79.891 ENCOUNTER FOR LONG-TERM OPIATE ANALGESIC USE: ICD-10-CM

## 2020-03-09 DIAGNOSIS — Z96.651 STATUS POST TOTAL RIGHT KNEE REPLACEMENT: ICD-10-CM

## 2020-03-09 DIAGNOSIS — M17.0 PRIMARY OSTEOARTHRITIS OF BOTH KNEES: ICD-10-CM

## 2020-03-09 DIAGNOSIS — G89.4 CHRONIC PAIN DISORDER: ICD-10-CM

## 2020-03-09 DIAGNOSIS — M47.816 SPONDYLOSIS OF LUMBAR REGION WITHOUT MYELOPATHY OR RADICULOPATHY: ICD-10-CM

## 2020-03-09 DIAGNOSIS — M51.36 DDD (DEGENERATIVE DISC DISEASE), LUMBAR: ICD-10-CM

## 2020-03-09 DIAGNOSIS — M25.562 BILATERAL CHRONIC KNEE PAIN: ICD-10-CM

## 2020-03-09 DIAGNOSIS — G89.29 BILATERAL CHRONIC KNEE PAIN: ICD-10-CM

## 2020-03-09 LAB — VIT B1 BLD-MCNC: 60 UG/L (ref 38–122)

## 2020-03-09 NOTE — TELEPHONE ENCOUNTER
Patient was last seen in the office 01/31/20 by Virginia Sanchez. This is a  patient. Patient last received this medication 02/10/20. Patient completed UDS 10/16/19 and was consistent. Patient has a pain contract on file. Patient has a follow up 03/23/20.

## 2020-03-09 NOTE — TELEPHONE ENCOUNTER
----- Message from Quinton Xavier, Patient Care Assistant sent at 3/9/2020  4:35 PM CDT -----  Contact: DONTAE MOON [[id number]]  Can the clinic reply in [facility]: No    Please refill the medication(s) listed below. The patient can be reached at this phone number once it is called into the pharmacy.[phone]    Medication #1oxyCODONE-acetaminophen (PERCOCET)  mg per tablet    Medication #2    Preferred Pharmacy:   [facility] [address]

## 2020-03-10 ENCOUNTER — TELEPHONE (OUTPATIENT)
Dept: ORTHOPEDICS | Facility: CLINIC | Age: 55
End: 2020-03-10

## 2020-03-10 RX ORDER — OXYCODONE AND ACETAMINOPHEN 10; 325 MG/1; MG/1
1 TABLET ORAL EVERY 8 HOURS PRN
Qty: 90 TABLET | Refills: 0 | Status: SHIPPED | OUTPATIENT
Start: 2020-03-11 | End: 2020-04-08 | Stop reason: SDUPTHER

## 2020-03-10 NOTE — TELEPHONE ENCOUNTER
----- Message from Radha Cruz sent at 3/10/2020  8:41 AM CDT -----  Contact: DONTAE MOON [[id number]]  Can the clinic reply in [facility]: no      Please refill the medication(s) listed below. Please call the patient when the prescription(s) is ready for  at this phone number   [phone]      Medication #1 oxyCODONE-acetaminophen (PERCOCET)  mg per tablet    Medication #2       Preferred Pharmacy: [facility] [address]

## 2020-03-10 NOTE — TELEPHONE ENCOUNTER
Spoke with patient to remind her of her scheduled appointment and xray on 3/12/20.The patient appreciated the phone call.

## 2020-03-10 NOTE — TELEPHONE ENCOUNTER
Patient was contacted and informed that her medication has been sent to her pharmacy.

## 2020-03-11 ENCOUNTER — PATIENT OUTREACH (OUTPATIENT)
Dept: ADMINISTRATIVE | Facility: OTHER | Age: 55
End: 2020-03-11

## 2020-03-12 ENCOUNTER — PATIENT OUTREACH (OUTPATIENT)
Dept: ADMINISTRATIVE | Facility: HOSPITAL | Age: 55
End: 2020-03-12

## 2020-03-12 ENCOUNTER — OFFICE VISIT (OUTPATIENT)
Dept: PAIN MEDICINE | Facility: CLINIC | Age: 55
End: 2020-03-12
Payer: MEDICARE

## 2020-03-12 ENCOUNTER — OFFICE VISIT (OUTPATIENT)
Dept: PAIN MEDICINE | Facility: CLINIC | Age: 55
End: 2020-03-12
Attending: ANESTHESIOLOGY
Payer: MEDICARE

## 2020-03-12 ENCOUNTER — HOSPITAL ENCOUNTER (OUTPATIENT)
Dept: RADIOLOGY | Facility: OTHER | Age: 55
Discharge: HOME | End: 2020-03-12
Attending: ORTHOPAEDIC SURGERY
Payer: MEDICARE

## 2020-03-12 ENCOUNTER — OFFICE VISIT (OUTPATIENT)
Dept: ORTHOPEDICS | Facility: CLINIC | Age: 55
End: 2020-03-12
Payer: MEDICARE

## 2020-03-12 VITALS
SYSTOLIC BLOOD PRESSURE: 126 MMHG | HEART RATE: 78 BPM | BODY MASS INDEX: 45.15 KG/M2 | DIASTOLIC BLOOD PRESSURE: 83 MMHG | HEIGHT: 65 IN | WEIGHT: 271 LBS

## 2020-03-12 VITALS
OXYGEN SATURATION: 100 % | HEIGHT: 65 IN | TEMPERATURE: 99 F | DIASTOLIC BLOOD PRESSURE: 96 MMHG | RESPIRATION RATE: 18 BRPM | BODY MASS INDEX: 45.15 KG/M2 | SYSTOLIC BLOOD PRESSURE: 155 MMHG | WEIGHT: 271 LBS | HEART RATE: 84 BPM

## 2020-03-12 VITALS
DIASTOLIC BLOOD PRESSURE: 96 MMHG | HEART RATE: 84 BPM | TEMPERATURE: 99 F | OXYGEN SATURATION: 100 % | WEIGHT: 270.94 LBS | RESPIRATION RATE: 18 BRPM | HEIGHT: 65 IN | BODY MASS INDEX: 45.14 KG/M2 | SYSTOLIC BLOOD PRESSURE: 155 MMHG

## 2020-03-12 DIAGNOSIS — M50.30 DDD (DEGENERATIVE DISC DISEASE), CERVICAL: ICD-10-CM

## 2020-03-12 DIAGNOSIS — R52 PAIN: ICD-10-CM

## 2020-03-12 DIAGNOSIS — M75.52 SUBACROMIAL BURSITIS OF LEFT SHOULDER JOINT: Primary | ICD-10-CM

## 2020-03-12 DIAGNOSIS — M79.18 MYOFASCIAL PAIN: ICD-10-CM

## 2020-03-12 DIAGNOSIS — M54.12 CERVICAL RADICULAR PAIN: ICD-10-CM

## 2020-03-12 DIAGNOSIS — M75.52 SUBACROMIAL BURSITIS OF LEFT SHOULDER JOINT: ICD-10-CM

## 2020-03-12 DIAGNOSIS — M54.12 CERVICAL RADICULOPATHY: ICD-10-CM

## 2020-03-12 DIAGNOSIS — M47.812 CERVICAL SPONDYLOSIS: ICD-10-CM

## 2020-03-12 DIAGNOSIS — G89.29 CHRONIC LEFT SHOULDER PAIN: ICD-10-CM

## 2020-03-12 DIAGNOSIS — M75.32 CALCIFIC TENDINITIS OF LEFT SHOULDER: Primary | ICD-10-CM

## 2020-03-12 DIAGNOSIS — M25.512 CHRONIC LEFT SHOULDER PAIN: ICD-10-CM

## 2020-03-12 DIAGNOSIS — G89.4 CHRONIC PAIN DISORDER: ICD-10-CM

## 2020-03-12 DIAGNOSIS — M67.814 BICEPS TENDINOSIS OF LEFT SHOULDER: ICD-10-CM

## 2020-03-12 PROCEDURE — 20610 DRAIN/INJ JOINT/BURSA W/O US: CPT | Mod: PBBFAC,LT | Performed by: ANESTHESIOLOGY

## 2020-03-12 PROCEDURE — 99499 UNLISTED E&M SERVICE: CPT | Mod: S$PBB,,, | Performed by: ANESTHESIOLOGY

## 2020-03-12 PROCEDURE — 99214 OFFICE O/P EST MOD 30 MIN: CPT | Mod: PBBFAC,25,27 | Performed by: ORTHOPAEDIC SURGERY

## 2020-03-12 PROCEDURE — 99213 OFFICE O/P EST LOW 20 MIN: CPT | Mod: PBBFAC,25 | Performed by: ANESTHESIOLOGY

## 2020-03-12 PROCEDURE — 99499 NO LOS: ICD-10-PCS | Mod: S$PBB,,, | Performed by: ANESTHESIOLOGY

## 2020-03-12 PROCEDURE — 73030 X-RAY EXAM OF SHOULDER: CPT | Mod: 26,LT,, | Performed by: RADIOLOGY

## 2020-03-12 PROCEDURE — 73030 X-RAY EXAM OF SHOULDER: CPT | Mod: TC,FY,LT

## 2020-03-12 PROCEDURE — 20610 PR DRAIN/INJECT LARGE JOINT/BURSA: ICD-10-PCS | Mod: S$PBB,LT,, | Performed by: ANESTHESIOLOGY

## 2020-03-12 PROCEDURE — 20550 NJX 1 TENDON SHEATH/LIGAMENT: CPT | Mod: PBBFAC | Performed by: ORTHOPAEDIC SURGERY

## 2020-03-12 PROCEDURE — 99214 OFFICE O/P EST MOD 30 MIN: CPT | Mod: PBBFAC,25,27 | Performed by: NURSE PRACTITIONER

## 2020-03-12 PROCEDURE — 99999 PR PBB SHADOW E&M-EST. PATIENT-LVL III: CPT | Mod: PBBFAC,,, | Performed by: ANESTHESIOLOGY

## 2020-03-12 PROCEDURE — 20610 DRAIN/INJ JOINT/BURSA W/O US: CPT | Mod: S$PBB,LT,, | Performed by: ANESTHESIOLOGY

## 2020-03-12 PROCEDURE — 99999 PR PBB SHADOW E&M-EST. PATIENT-LVL III: ICD-10-PCS | Mod: PBBFAC,,, | Performed by: ANESTHESIOLOGY

## 2020-03-12 PROCEDURE — 99999 PR PBB SHADOW E&M-EST. PATIENT-LVL IV: CPT | Mod: PBBFAC,,, | Performed by: NURSE PRACTITIONER

## 2020-03-12 PROCEDURE — 99999 PR PBB SHADOW E&M-EST. PATIENT-LVL IV: ICD-10-PCS | Mod: PBBFAC,,, | Performed by: ORTHOPAEDIC SURGERY

## 2020-03-12 PROCEDURE — 99999 PR PBB SHADOW E&M-EST. PATIENT-LVL IV: ICD-10-PCS | Mod: PBBFAC,,, | Performed by: NURSE PRACTITIONER

## 2020-03-12 PROCEDURE — 99214 OFFICE O/P EST MOD 30 MIN: CPT | Mod: 25,S$PBB,, | Performed by: ORTHOPAEDIC SURGERY

## 2020-03-12 PROCEDURE — 99214 PR OFFICE/OUTPT VISIT, EST, LEVL IV, 30-39 MIN: ICD-10-PCS | Mod: 25,S$PBB,, | Performed by: ORTHOPAEDIC SURGERY

## 2020-03-12 PROCEDURE — 99999 PR PBB SHADOW E&M-EST. PATIENT-LVL IV: CPT | Mod: PBBFAC,,, | Performed by: ORTHOPAEDIC SURGERY

## 2020-03-12 PROCEDURE — 99213 PR OFFICE/OUTPT VISIT, EST, LEVL III, 20-29 MIN: ICD-10-PCS | Mod: S$PBB,,, | Performed by: NURSE PRACTITIONER

## 2020-03-12 PROCEDURE — 99213 OFFICE O/P EST LOW 20 MIN: CPT | Mod: S$PBB,,, | Performed by: NURSE PRACTITIONER

## 2020-03-12 PROCEDURE — 20550 TENDON SHEATH: ICD-10-PCS | Mod: S$PBB,LT,, | Performed by: ORTHOPAEDIC SURGERY

## 2020-03-12 PROCEDURE — 73030 XR SHOULDER TRAUMA 3 VIEW LEFT: ICD-10-PCS | Mod: 26,LT,, | Performed by: RADIOLOGY

## 2020-03-12 RX ORDER — METHYLPREDNISOLONE ACETATE 40 MG/ML
40 INJECTION, SUSPENSION INTRA-ARTICULAR; INTRALESIONAL; INTRAMUSCULAR; SOFT TISSUE
Status: COMPLETED | OUTPATIENT
Start: 2020-03-12 | End: 2020-03-12

## 2020-03-12 RX ORDER — BUPIVACAINE HYDROCHLORIDE 5 MG/ML
4 INJECTION, SOLUTION EPIDURAL; INTRACAUDAL
Status: COMPLETED | OUTPATIENT
Start: 2020-03-12 | End: 2020-03-12

## 2020-03-12 RX ORDER — GABAPENTIN 300 MG/1
CAPSULE ORAL
Qty: 90 CAPSULE | Refills: 1 | Status: SHIPPED | OUTPATIENT
Start: 2020-03-12 | End: 2020-11-04

## 2020-03-12 RX ADMIN — METHYLPREDNISOLONE ACETATE 40 MG: 40 INJECTION, SUSPENSION INTRA-ARTICULAR; INTRALESIONAL; INTRAMUSCULAR; SOFT TISSUE at 12:03

## 2020-03-12 RX ADMIN — TRIAMCINOLONE ACETONIDE 40 MG: 40 INJECTION, SUSPENSION INTRA-ARTICULAR; INTRAMUSCULAR at 10:03

## 2020-03-12 RX ADMIN — BUPIVACAINE HYDROCHLORIDE 20 MG: 5 INJECTION, SOLUTION EPIDURAL; INTRACAUDAL; PERINEURAL at 12:03

## 2020-03-12 NOTE — PROGRESS NOTES
Patient has severe pain radiating down her left arm she says it is burning and tingling she does not necessarily consider the shoulder pain she says is burning from her neck all the way down she is in tears right now we cannot examine the patient because she is in so much pain the plan is to get her either Pain Management urgently today or center to the emergency room because her pain is not controlled this does not appear to be shoulder pain though x-rays show calcific tendinitis the way she describes the pain this is more radiculopathy      Post return from pain management she was given 2 injections by Pain Management she actually was less tearful doing well however she continued to have this point anterior shoulder pain plan after discussing with pain management would be biceps tendon injection the patient still states she has numbness and tingling radiating into her arm but the pain is more manageable please see procedure note for biceps tendon injection  Patient ID: Alivia Thomas is a 54 y.o. female.     Chief Complaint: Pain of the Left Shoulder     Referring Provider: No ref. provider found      History of Present Illness:  Patient is a 54 y.o. RHD female who presents today with complaints of left shoulder pain. She reports 1 week of worsening anterolateral pain. Denies trauma. Reports radiating pain from cervical spine throughout left upper extremity. She has been taking percocet as prescribed by Dr. Wagner who shee sees for chronic lumbar and knee pain. Rates pain 10/10 at rest. Pain worsens at night, unable to sleep soundly due to pain. Describes pain as unbearable and tingling. Denies fever, chills, night sweats, body aches.          Review of patient's allergies indicates:   Allergen Reactions    Strawberry Anaphylaxis          Current Medications          Current Outpatient Medications   Medication Sig Dispense Refill    albuterol (VENTOLIN HFA) 90 mcg/actuation inhaler INHALE TWO PUFFS INTO  LUNGS EVERY 4 TO 6 HOURS AS NEEDED FOR SHORTNESS OF BREATH AND FOR WHEEZING 18 each 0    allopurinol (ZYLOPRIM) 300 MG tablet Take 1 tablet (300 mg total) by mouth once daily. 90 tablet 3    atorvastatin (LIPITOR) 20 MG tablet Take 1 tablet (20 mg total) by mouth once daily. 90 tablet 3    b complex vitamins tablet Take 1 tablet by mouth every other day.         calcium citrate/vitamin D2 (ISIAH-CITRATE ORAL) Take 500 mg by mouth 2 (two) times daily.        cyanocobalamin (VITAMIN B-12) 1000 MCG tablet Take 100 mcg by mouth every morning.        FLUoxetine (PROZAC) 20 mg/5 mL (4 mg/mL) solution Take 10 mLs (40 mg total) by mouth once daily. 300 mL 6    fluticasone (FLONASE) 50 mcg/actuation nasal spray 1 spray by Each Nare route 2 (two) times daily as needed for Rhinitis. 15 g 0    levoFLOXacin (LEVAQUIN) 250 mg/10 mL Soln Take 20 mLs (500 mg total) by mouth once daily. 200 mL 0    levoFLOXacin (LEVAQUIN) 500 MG tablet Take 1 tablet (500 mg total) by mouth once daily. 10 tablet 0    MULTIVIT-IRON-MIN-FOLIC ACID 3,500-18-0.4 UNIT-MG-MG ORAL CHEW Take 1 tablet by mouth 2 (two) times daily.         omeprazole (PRILOSEC) 20 MG capsule Take 1 capsule (20 mg total) by mouth every morning. 90 capsule 3    ondansetron (ZOFRAN) 4 MG tablet Take 1 tablet (4 mg total) by mouth every 6 (six) hours as needed. 20 tablet 0    oxyCODONE-acetaminophen (PERCOCET)  mg per tablet Take 1 tablet by mouth every 8 (eight) hours as needed for Pain. Greater than 7 day quantity medically necessary 90 tablet 0    vitamin D 185 MG Tab Take 5,000 mg by mouth every morning.           No current facility-administered medications for this visit.                  Past Medical History:   Diagnosis Date    Allergy      Anemia      Asthma      Bilateral shoulder bursitis      Cervical stenosis of spine      Chronic pain      DDD (degenerative disc disease), cervical      Depression 1/28/2019    Diabetes mellitus type II       DJD (degenerative joint disease) of knee 6/19/2014    Facet arthritis of lumbar region 12/17/2015    Facet syndrome 12/17/2015    GERD (gastroesophageal reflux disease)      Heartburn      Hyperlipidemia      Hypertension      Morbid obesity      Neuromuscular disorder      PADDY (obstructive sleep apnea)      Proteinuria      Right carpal tunnel syndrome      Sacroiliitis 6/13/2018    Sacroiliitis      Sleep apnea                 Past Surgical History:   Procedure Laterality Date    CARPAL TUNNEL RELEASE        CARPAL TUNNEL RELEASE   1980s     left    CARPAL TUNNEL RELEASE   2012     right    ESOPHAGOGASTRODUODENOSCOPY N/A 3/27/2019     Procedure: EGD (ESOPHAGOGASTRODUODENOSCOPY);  Surgeon: Robbin Bar MD;  Location: [facility] (2ND FLR);  Service: Endoscopy;  Laterality: N/A;  BMI 61.3/2nd floor case/svn    JOINT REPLACEMENT Bilateral       with 2 revisions on rt    KNEE SURGERY   3/2010     orthroscope    KNEE SURGERY   6-19-14     left TKR    LAPAROSCOPIC SLEEVE GASTRECTOMY N/A 8/28/2019     Procedure: GASTRECTOMY, SLEEVE, LAPAROSCOPIC with intraop EGD;  Surgeon: Heriberto Clements MD;  Location: [facility] OR 2ND FLR;  Service: General;  Laterality: N/A;    RADIOFREQUENCY ABLATION Right 7/9/2019     Procedure: RADIOFREQUENCY ABLATION;  Surgeon: Lina Wagner MD;  Location: [facility] PAIN MGT;  Service: Pain Management;  Laterality: Right;  RIGHT RFA L2,3,4,5  2 of 2    RADIOFREQUENCY ABLATION Left 12/19/2019     Procedure: RADIOFREQUENCY ABLATION, LEFT L2-L3-L4-L5 MEDIAL BRANCH 1 OF 2;  Surgeon: Lina Wagner MD;  Location: [facility] PAIN MGT;  Service: Pain Management;  Laterality: Left;    RADIOFREQUENCY ABLATION Right 12/31/2019     Procedure: RADIOFREQUENCY ABLATION, RIGHT L2-L3-L4-L5  2 OF 2;  Surgeon: Lina Wagner MD;  Location: [facility] PAIN MGT;  Service: Pain Management;  Laterality: Right;    RADIOFREQUENCY ABLATION OF LUMBAR MEDIAL BRANCH NERVE AT SINGLE LEVEL Left  "6/26/2018     Procedure: RADIOFREQUENCY ABLATION, NERVE, MEDIAL BRANCH, LUMBAR, 1 LEVEL;  Surgeon: Lina Wagner MD;  Location: [facility] PAIN MGT;  Service: Pain Management;  Laterality: Left;  Left Cooled RFA @ L2,3,4,5  [id number]  with Sedation     1 of 2    RADIOFREQUENCY ABLATION OF LUMBAR MEDIAL BRANCH NERVE AT SINGLE LEVEL Right 7/10/2018     Procedure: RADIOFREQUENCY ABLATION, NERVE, MEDIAL BRANCH, LUMBAR, 1 LEVEL;  Surgeon: Lina Wagner MD;  Location: [facility] PAIN MGT;  Service: Pain Management;  Laterality: Right;  RIght Cooled RFA @ L2,3,4,5  [id number] with Sedation     2 of 2    TRIGGER FINGER RELEASE Right 2017    TRIGGER FINGER RELEASE Left 7/29/2019     Procedure: RELEASE, TRIGGER FINGER, Left Thumb;  Surgeon: Velma Garcia MD;  Location: [facility] OR;  Service: Orthopedics;  Laterality: Left;  Local w/ MAC         Review of Systems:  Constitutional: Negative for chills and fever.   Respiratory: Negative for cough and shortness of breath.    Gastrointestinal: Negative for nausea and vomiting.   Skin: Negative for rash.   Neurological: Negative for dizziness and headaches.   Psychiatric/Behavioral: crying due to pain and inability to sleep.    MSK as in HPI         OBJECTIVE:      PHYSICAL EXAM:  /83   Pulse 78   Ht 5' 5" (1.651 m)   Wt 122.9 kg (271 lb)   LMP 06/26/2018   BMI 45.10 kg/m²         RUE:  Good active ROM of the wrist and fingers. AIN/PIN/Radial/Median/Ulnar Nerves assessed in isolation without deficit. Radial & Ulnar arteries palpated 2+. Capillary Refill <3s. Shoulder exam unremarkable.     LUE:  Good active ROM of the wrist and fingers. AIN/PIN/Radial/Median/Ulnar Nerves assessed in isolation without deficit. Radial & Ulnar arteries palpated 2+. Capillary Refill <3s. Difficulty shoulder evaluation due to pain. TTP over bicipital groove and lateral structures. Refused AROM, passive FF to 30, limited by pain. No special testing performed. "         Radiology/Procedures:  XR Left Shoulder 3 View 03/12/2020  No acute fracture or dislocation.  Joint spaces appear relatively well maintained.  Calcific density along the lateral border of the humerus may represent calcific tendinosis of the rotator cuff.  The visualized lung is clear.        ASSESSMENT/PLAN:          ICD-10-CM ICD-9-CM   1. Calcific tendinitis of left shoulder M75.32 726.11   2. Cervical radicular pain M54.12 723.4         No orders of the defined types were placed in this encounter.        Plan:   -Discussed nature of her condition.   -Due to limitations of physical exam, she was seen by Pain Management for evaluation. Subacromial and Pec minor CSIs administered by Dr. Massey.  -Gabapentin as prescribed by Dr. Massey   -[facility] for biceps tendon sheath CSI  -Follow up via phone call in 1 week, next steps in treatment pending improvement from CSIs.         The patient indicates understanding of these issues and agrees to the plan.        This note has been scribed in part by Ember Roca MS, OTC, my Sports Medicine Assistant (SMA). This SMA performed & documented a complete history pre-assessment including the history of present illness, which I, Velma Garcia MD, explored & confirmed personally with the patient. The SMA has scribed portions of this note including my physical exanimation, diagnostic imaging interpretation, procedures performed, my plan of care & diagnosis. I agree that the scribed documentation is accurate & complete.

## 2020-03-12 NOTE — PROGRESS NOTES
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 that she has not gone to formal physical therapy but that she has been going to a pool that has exercise classes which is free for her and that she feels like it is helping her continue to lose weight.  Additionally she continues using hydrocodone/acetaminophen 7.5/750 approximately 3 times per day when necessary and states that also helps with her pain symptoms.  He she's still talks to have replacement for the left knee, but would like to lose weight further before going to that.  She has also had previous injections into her knees which have offered little to no relief in her pain symptoms.  She has had no other health changes since previous encounter.    Previous encounter 1/21/2014:  HPI Comments: 49 yo female presents for initial evaluation of bilateral knee pain, L>R. She is s/p arthroscopic right knee surgery and eventual replacement and then revision surgeries (Dr. Swan, Orthopedics). The pain is present in both knees and is described as a terrible ache. She hears occasional popping in both knees with movement. The pain is worse with being on her feet and getting up from a sitting to standing position. Denies lower ext weakness or paresthesias. She does not have back pain or pain radiating down her legs. The pain is better with Celebrex and rest. She also takes  Vicodin ES TID but this makes her very sleepy. She is not sure it helps with the pain because she usually falls asleep.     Physical Therapy: not since 2011 just prior to her 3rd right knee surgery (revision after replacement); has tried swimming which helps with weight loss    Non-pharmacologic Treatment: none    Pain Medications: Percocet and celebrex    Blood thinners: ASA 81 mg daily     Interventional Therapies:   steroid inj and visco-supplementation (series of 3) in left knee- not helpful  3/31/14 Bilateral genicular nerve blocks  2/16/16 Bilateral L2,3,4,5 MBB  3/1/16 Bilateral L2,3,4,5 MBB   4/6/16 Left L2,3,4,5 RFA- significant relief  4/20/16 Right L2,3,4,5 RFA- significant relief  10/5/16 Left L2,3,4,5 cooled RFA  10/19/16 Right L2,3,4,5 cooled RFA  4/26/17 Left L2,3,4,5 cooled RFA  5/9/17 Right L2,3,4,5 cooled RFA  12/26/2017- Left L2,3,4,5 cooled RFA  1/9/2018- Right L2,3,4,5 cooled RFA  6/26/18 Left L2,3,4,5 cooled RFA- 60% relief  7/10/18 Right L2,3,4,5 cooled RFA- 60% relief  9/28/18 TPIs- significant relief  12/27/18 Left L2,3,4,5 RFA- 70% relief  1/29/19 Right L2,3,4,5 RFA- 70% relief  6/18/19 Left L2,3,4,5 RFA- 70% relief  7/9/19 Right L2,3,4,5 RFA- 50% relief  12/19/19 Left L2,3,4,5 RFA- 80% relief  12/31/19 Right L2,3,4,5 RFA- 50% relief    Relevant Surgeries: right knee surgery x3 (arthroscopic, then replacement and subsequent revision surgery), s/p left total knee arthroplasty    Relevant Imaging:  Xray Lumbar spine 09/21/2015  Lumbar spine radiograph    Comparison: None    Results: AP, lateral neutral, lateral flexion , lateral extension, bilateral oblique and spot views. The alignment of the lumbar spine demonstrates a mild levoscoliosis . 11-mm anterior listhesis of L4 relative to L5 with no translational abnormalities  seen on flexion and extension views. The vertebral body heights are well-maintained , mild disk space narrowing L4-L5 and L5-S1. Mild anterior and marginal osteophyte  formation seen throughout the lumbar spine . The oblique views demonstrate no   definite spondylolysis. There is facet joint osseous hypertrophy noted at L3-L4 and L4-L5.      Impression         The Significant spondylosis of the lumbar spine with grade 1 anterior listhesis L4 relative to L5.  Facet joint osseous hypertrophy L3-L4 and L4-5.      Electronically signed by: BLESSING ROE MD  Date: 09/21/15  Time: 09:56          X-ray knee bilateral 8/26/2015:  Standing AP knees, lateral view of both knees and sunrise view of both patella. Study compared to May 2015. Postop change of bilateral knee replacement. The prosthetic components are in satisfactory position. Erosive changes involving the patella   again evident bilaterally.    Impression no significant change.      Xray Bilateral Knee 05/25/2015:  There are bilateral total knee arthroplasties with posterior resurfacing of the patella. As observed on 12/17/2014 there is a fracture of the left patella in the parasagittal plane.    Heterotopic bone is evident about each knee.    I detect no dislocation, unusual radiopaque retained foreign body, lytic or blastic lesion, or chondrocalcinosis.    Cervical MRI 4/24/2018:    Narrative     EXAMINATION:  MRI CERVICAL SPINE WITHOUT CONTRAST    CLINICAL HISTORY:  Neck pain, first study;.  Cervicalgia.    TECHNIQUE:  Multiplanar, multisequence MR images of the cervical spine were acquired without the administration of contrast.    COMPARISON:  None.    FINDINGS:  There is reversal of the normal cervical lordosis which could be related to patient positioning versus muscle spasm.    Cervical vertebral body heights are well maintained without evidence of acute fracture or dislocation.  Marrow signal is unremarkable.    Spinal canal contents are unremarkable.  No abnormal masses or collections.    Individual levels as detailed below:    C2-3: No significant spinal canal stenosis or neuroforaminal narrowing.    C3-4: Facet  arthropathy.  No significant spinal canal stenosis or neuroforaminal narrowing.    C4-5: Facet arthropathy.  Small broad-based disc osteophyte complex.  Mild right neuroforaminal narrowing.  Mild spinal canal stenosis.    C5-6: Facet arthropathy.  Uncovertebral joint spurring.  Broad-based posterior disc osteophyte complex.  Mild right neuroforaminal narrowing.  Mild spinal canal stenosis.    C6-7: Facet arthropathy.  No significant spinal canal stenosis or neuroforaminal narrowing.    C7-T1: No significant spinal canal stenosis or neuroforaminal narrowing.    Paraspinal muscles are unremarkable.  Soft tissues are intact.   Impression       Mild multilevel cervical spondylosis with mild spinal canal stenosis and mild right neuroforaminal narrowing at C4-5 and C5-6.       Lab Results   Component Value Date    HGBA1C 6.6 (H) 01/02/2020     Lab Results   Component Value Date    CHOL 152 03/04/2020    CHOL 160 01/02/2020    CHOL 221 (H) 11/14/2019     Lab Results   Component Value Date    HDL 51 03/04/2020    HDL 49 01/02/2020    HDL 46 11/14/2019     Lab Results   Component Value Date    LDLCALC 88.6 03/04/2020    LDLCALC 97.8 01/02/2020    LDLCALC 157.0 11/14/2019     Lab Results   Component Value Date    TRIG 62 03/04/2020    TRIG 66 01/02/2020    TRIG 90 11/14/2019     Lab Results   Component Value Date    CHOLHDL 33.6 03/04/2020    CHOLHDL 30.6 01/02/2020    CHOLHDL 20.8 11/14/2019         Past Medical History:   Diagnosis Date    Allergy     Anemia     Asthma     Bilateral shoulder bursitis     Cervical stenosis of spine     Chronic pain     DDD (degenerative disc disease), cervical     Depression 1/28/2019    Diabetes mellitus type II     DJD (degenerative joint disease) of knee 6/19/2014    Facet arthritis of lumbar region 12/17/2015    Facet syndrome 12/17/2015    GERD (gastroesophageal reflux disease)     Heartburn     Hyperlipidemia     Hypertension     Morbid obesity     Neuromuscular  disorder     PADDY (obstructive sleep apnea)     Proteinuria     Right carpal tunnel syndrome     Sacroiliitis 6/13/2018    Sacroiliitis     Sleep apnea      Past Surgical History:   Procedure Laterality Date    CARPAL TUNNEL RELEASE      CARPAL TUNNEL RELEASE  1980s    left    CARPAL TUNNEL RELEASE  2012    right    ESOPHAGOGASTRODUODENOSCOPY N/A 3/27/2019    Procedure: EGD (ESOPHAGOGASTRODUODENOSCOPY);  Surgeon: Robbin Bar MD;  Location: [facility] (2ND FLR);  Service: Endoscopy;  Laterality: N/A;  BMI 61.3/2nd floor case/svn    JOINT REPLACEMENT Bilateral     with 2 revisions on rt    KNEE SURGERY  3/2010    orthroscope    KNEE SURGERY  6-19-14    left TKR    LAPAROSCOPIC SLEEVE GASTRECTOMY N/A 8/28/2019    Procedure: GASTRECTOMY, SLEEVE, LAPAROSCOPIC with intraop EGD;  Surgeon: Heriberto Clements MD;  Location: [facility] OR 2ND FLR;  Service: General;  Laterality: N/A;    RADIOFREQUENCY ABLATION Right 7/9/2019    Procedure: RADIOFREQUENCY ABLATION;  Surgeon: Lina Wagner MD;  Location: [facility] PAIN MGT;  Service: Pain Management;  Laterality: Right;  RIGHT RFA L2,3,4,5  2 of 2    RADIOFREQUENCY ABLATION Left 12/19/2019    Procedure: RADIOFREQUENCY ABLATION, LEFT L2-L3-L4-L5 MEDIAL BRANCH 1 OF 2;  Surgeon: Lina Wagner MD;  Location: [facility] PAIN MGT;  Service: Pain Management;  Laterality: Left;    RADIOFREQUENCY ABLATION Right 12/31/2019    Procedure: RADIOFREQUENCY ABLATION, RIGHT L2-L3-L4-L5  2 OF 2;  Surgeon: Lina Wagner MD;  Location: [facility] PAIN MGT;  Service: Pain Management;  Laterality: Right;    RADIOFREQUENCY ABLATION OF LUMBAR MEDIAL BRANCH NERVE AT SINGLE LEVEL Left 6/26/2018    Procedure: RADIOFREQUENCY ABLATION, NERVE, MEDIAL BRANCH, LUMBAR, 1 LEVEL;  Surgeon: Lina Wagner MD;  Location: [facility] PAIN MGT;  Service: Pain Management;  Laterality: Left;  Left Cooled RFA @ L2,3,4,5  [id number]  with Sedation    1 of 2    RADIOFREQUENCY ABLATION OF LUMBAR MEDIAL  BRANCH NERVE AT SINGLE LEVEL Right 7/10/2018    Procedure: RADIOFREQUENCY ABLATION, NERVE, MEDIAL BRANCH, LUMBAR, 1 LEVEL;  Surgeon: Lina Wagner MD;  Location: [facility] PAIN MGT;  Service: Pain Management;  Laterality: Right;  RIght Cooled RFA @ L2,3,4,5  [id number] with Sedation    2 of 2    TRIGGER FINGER RELEASE Right 2017    TRIGGER FINGER RELEASE Left 7/29/2019    Procedure: RELEASE, TRIGGER FINGER, Left Thumb;  Surgeon: Velma Garcia MD;  Location: [facility] OR;  Service: Orthopedics;  Laterality: Left;  Local w/ MAC     Family History   Problem Relation Age of Onset    Diabetes Mother     Cataracts Mother     Diabetes Father     Cataracts Father     Coronary artery disease Brother     Diabetes Brother     Hypertension Sister     Diabetes Sister     No Known Problems Sister     Cancer Sister         lymphoma     Diabetes Brother     Hypotension Brother     Kidney failure Brother     Hypotension Brother     Amblyopia Neg Hx     Blindness Neg Hx     Glaucoma Neg Hx     Macular degeneration Neg Hx     Retinal detachment Neg Hx     Strabismus Neg Hx     Stroke Neg Hx     Thyroid disease Neg Hx      Social History     Socioeconomic History    Marital status:      Spouse name: Not on file    Number of children: Not on file    Years of education: Not on file    Highest education level: Not on file   Occupational History    Not on file   Social Needs    Financial resource strain: Not on file    Food insecurity:     Worry: Not on file     Inability: Not on file    Transportation needs:     Medical: Not on file     Non-medical: Not on file   Tobacco Use    Smoking status: Never Smoker    Smokeless tobacco: Never Used   Substance and Sexual Activity    Alcohol use: Not Currently     Comment: occasionally     Drug use: No    Sexual activity: Yes     Partners: Female     Birth control/protection: None   Lifestyle    Physical activity:     Days per week: Not on file      Minutes per session: Not on file    Stress: Not on file   Relationships    Social connections:     Talks on phone: Not on file     Gets together: Not on file     Attends [employer] service: Not on file     Active member of club or organization: Not on file     Attends meetings of clubs or organizations: Not on file     Relationship status: Not on file   Other Topics Concern    Not on file   Social History Narrative    Disabled. The patient is the youngest of 6 siblings. Single. Lives with single-sex partner.                  Review of patient's allergies indicates:  No Known Allergies    Medication List with Changes/Refills   Current Medications    ALBUTEROL (VENTOLIN HFA) 90 MCG/ACTUATION INHALER    INHALE TWO PUFFS INTO LUNGS EVERY 4 TO 6 HOURS AS NEEDED FOR SHORTNESS OF BREATH AND FOR WHEEZING    ALLOPURINOL (ZYLOPRIM) 300 MG TABLET    Take 1 tablet (300 mg total) by mouth once daily.    ATORVASTATIN (LIPITOR) 20 MG TABLET    Take 1 tablet (20 mg total) by mouth once daily.    B COMPLEX VITAMINS TABLET    Take 1 tablet by mouth every other day.     CALCIUM CITRATE/VITAMIN D2 (ISIAH-CITRATE ORAL)    Take 500 mg by mouth 2 (two) times daily.    CYANOCOBALAMIN (VITAMIN B-12) 1000 MCG TABLET    Take 100 mcg by mouth every morning.    FLUOXETINE (PROZAC) 20 MG/5 ML (4 MG/ML) SOLUTION    Take 10 mLs (40 mg total) by mouth once daily.    FLUTICASONE (FLONASE) 50 MCG/ACTUATION NASAL SPRAY    1 spray by Each Nare route 2 (two) times daily as needed for Rhinitis.    LEVOFLOXACIN (LEVAQUIN) 250 MG/10 ML SOLN    Take 20 mLs (500 mg total) by mouth once daily.    LEVOFLOXACIN (LEVAQUIN) 500 MG TABLET    Take 1 tablet (500 mg total) by mouth once daily.    MULTIVIT-IRON-MIN-FOLIC ACID 3,500-18-0.4 UNIT-MG-MG ORAL CHEW    Take 1 tablet by mouth 2 (two) times daily.     OMEPRAZOLE (PRILOSEC) 20 MG CAPSULE    Take 1 capsule (20 mg total) by mouth every morning.    ONDANSETRON (ZOFRAN) 4 MG TABLET    Take 1 tablet (4 mg total) by  "mouth every 6 (six) hours as needed.    OXYCODONE-ACETAMINOPHEN (PERCOCET)  MG PER TABLET    Take 1 tablet by mouth every 8 (eight) hours as needed for Pain. Greater than 7 day quantity medically necessary    VITAMIN D 185 MG TAB    Take 5,000 mg by mouth every morning.          REVIEW OF SYSTEMS:    GENERAL:  Recent weight loss.  RESPIRATORY:  Negative for cough, wheezing or shortness of breath, patient denies any recent URI.  CARDIOVASCULAR:  Negative for chest pain, leg swelling or palpitations.  GI:  Negative for abdominal discomfort, blood in stools or black stools or change in bowel habits, occasional constipation.  MUSCULOSKELETAL:  See HPI.  SKIN:  Negative for lesions, rash, and itching.  PSYCH:  No mood disorder or recent psychosocial stressors.  Patient's sleep is disturbed secondary to pain (patient reports also that she has insomnia).  HEMATOLOGY/LYMPHOLOGY:  Negative for prolonged bleeding, bruising easily or swollen nodes.  81 mg aspirin  ENDO: Patient has a history of diabetes  NEURO:   No history of headaches, syncope, paralysis, seizures or tremors.  All other reviewed and negative other than HPI.    OBJECTIVE:    BP (!) 155/96   Pulse 84   Temp 99.1 °F (37.3 °C)   Resp 18   Ht 5' 5" (1.651 m)   Wt 122.9 kg (271 lb)   LMP 06/26/2018   SpO2 100%   BMI 45.10 kg/m²     PHYSICAL EXAMINATION:    GENERAL: Well appearing, in no acute distress, alert and oriented x3.   PSYCH:  Mood and affect appropriate.  SKIN: Skin color, texture, turgor normal, no rashes or lesions.  HEAD/FACE:  Normocephalic, atraumatic.   NECK: Mild pain over left trapezius. No pain with palpation over cervical spine. Spurling's Negative bilaterally. Full ROM with mild pain on left lateral rotation.   CV: RRR with palpation of the radial artery.  PULM: No evidence of respiratory difficulty, symmetric chest rise.  BACK: Negative SLR bilaterally. There is pain to palpation over the lumbarparaspinals bilaterally.  Limited " ROM with pain on extension.  Positive facet loading bilaterally,  EXTREMITIES: Significant pain with palpation over left subacromial bursa and pectoral muscle. Significantly limited exam due to pain. Pain with palpation to bilateral SI joints.  JENNA is negative bilaterally. Well-healed midline scars to bilateral knees.  Painful extension and flexion of bilateral knees. Medial and lateral joint line tenderness to bilateral knees.  MUSCULOSKELETAL: Bilateral upper and lower extremity strength is normal and symmetric.  No atrophy or tone abnormalities are noted.  NEURO: Bilateral lower extremity coordination and muscle stretch reflexes are physiologic and symmetric.  Plantar response are downgoing. No clonus.  No loss of sensation is noted.  GAIT: Antalgic- ambulates without assistance.      Assessment:       Encounter Diagnoses   Name Primary?    Subacromial bursitis of left shoulder joint Yes    Myofascial pain     Chronic left shoulder pain     Cervical radiculopathy     Cervical spondylosis     DDD (degenerative disc disease), cervical     Chronic pain disorder          Plan:       - Previous imaging was reviewed and discussed with the patient today. Xray of shoulder reviewed today.     - Given significant amount of pain, left subacromial bursa injection in office today per Dr. Massey.     - After injection, patient was re-examined, cervical exam negative. Significant pain with shoulder exam. Discussed with Dr. Massey and Dr. Garcia about possible bicep injection.     - Consult physical therapy.     - Trial Gabapentin 300 mg TID. Titration instructions provided today.     - Continue Percocet 10/325 mg TID PRN.     - RTC in 2-3 weeks.       The above plan and management options were discussed at length with patient. Patient is in agreement with the above and verbalized understanding.     Adeola Robledo NP  03/12/2020

## 2020-03-12 NOTE — PROGRESS NOTES
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 Dipti Massey     Assistant: Adeola Robledo NP    Anesthestia: local    EBL: None    Specimens: None    All medications, allergies, and relevant histories were reviewed. No recent antibiotics or infections.  A time-out was taken to verify the correct patient, procedure, laterality, and appropriate medications/allergies.    Subacromial bursa injection and Pec Minor TPI: Left    Pt was advised, consented and had questions answered prior to proceeding.  Pt was placed in the seated position.  Sterile prep over left shoulder.  A 25g Needle was used to administer 2cc of bicarbonated 1% lidocaine at needle insertion site.  A 22-gauge 1.5 inch needle was advanced into the pec minor muscle.  3 cc of a mixture of 4 cc of 0.5% bupivacaine with 40 mg Depo-Medrol was injected after negative aspiration.  Next, the needle was advance superiorly to the subacromial bursa. After negative aspiration, the remaining 2 cc of the above mixture was injected.  Needle was removed and a bandage was applied.    The patient tolerated the procedure well and was discharged in excellent condition.  No complications noted.       Future Management:   Please see office visit note from Adeola Robledo for further plan

## 2020-03-12 NOTE — PROGRESS NOTES
Chart reviewed.   Immunizations: Triggered Imm Registry     Orders placed: n/a  Upcoming appts to satisfy DANIEL topics: n/a

## 2020-03-12 NOTE — PATIENT INSTRUCTIONS
"We are going to start gabapentin for your low pain.  To help with side effects, I recommend starting this slowly.  This medication may make you sleepy, so do not drive until you know how it affect you.  Start this medication at bedtime.  If it does not make you too sleepy, you can increase this as listed below to 3 times daily.  Some people choose to only take this at night.  Other side effects are generally mild and include diarrhea, nausea, and "forgetfullness."    Gabapentin dose schedule:  - Start with one capsule at night for 7 days   - Then increase to one capsule twice a day for 7 days  - Then increase to one capsule three times daily    "

## 2020-03-24 RX ORDER — TRIAMCINOLONE ACETONIDE 40 MG/ML
40 INJECTION, SUSPENSION INTRA-ARTICULAR; INTRAMUSCULAR
Status: DISCONTINUED | OUTPATIENT
Start: 2020-03-12 | End: 2020-03-24 | Stop reason: HOSPADM

## 2020-03-24 NOTE — PROCEDURES
Tendon Sheath  Date/Time: 3/12/2020 10:30 AM  Performed by: Velma Garcia MD  Authorized by: Velma Garcia MD     Consent Done?:  Yes (Verbal)  Indications:  Pain  Timeout: prior to procedure the correct patient, procedure, and site was verified    Prep: patient was prepped and draped in usual sterile fashion      Local anesthesia used?: Yes    Anesthesia:  Local infiltration  Local anesthetic:  Lidocaine 1% without epinephrine  Location:  Shoulder  Site:  L bicep tendon  Ultrasonic guidance for needle placement?: No    Needle size:  22 G  Medications:  40 mg triamcinolone acetonide 40 mg/mL

## 2020-03-27 ENCOUNTER — TELEPHONE (OUTPATIENT)
Dept: INTERNAL MEDICINE | Facility: CLINIC | Age: 55
End: 2020-03-27

## 2020-03-27 DIAGNOSIS — M10.9 GOUT, UNSPECIFIED CAUSE, UNSPECIFIED CHRONICITY, UNSPECIFIED SITE: ICD-10-CM

## 2020-03-27 DIAGNOSIS — E11.65 UNCONTROLLED TYPE 2 DIABETES MELLITUS WITH HYPERGLYCEMIA: Primary | ICD-10-CM

## 2020-03-27 RX ORDER — ALLOPURINOL 300 MG/1
300 TABLET ORAL 2 TIMES DAILY
Qty: 180 TABLET | Refills: 3 | Status: SHIPPED | OUTPATIENT
Start: 2020-03-27 | End: 2020-11-04 | Stop reason: SDUPTHER

## 2020-03-27 NOTE — TELEPHONE ENCOUNTER
Patient feeling well, they want her to have high protein diet, has been suffering bad with gout. She has doubled up on the allopurinol (ZYLOPRIM) 300 MG tablet and does not seem to be helping     patient is unable to do virtual visit, but will like a phone call.     Please Advise  Thank You

## 2020-03-27 NOTE — TELEPHONE ENCOUNTER
Pt having no gout now - doing well on allopurinol 600mg will continue this - keep protein at 50-60g daily rather than 90 g daily.  Please schedule lab in 4 weeks - thanks.

## 2020-03-30 ENCOUNTER — PATIENT OUTREACH (OUTPATIENT)
Dept: ADMINISTRATIVE | Facility: OTHER | Age: 55
End: 2020-03-30

## 2020-03-31 ENCOUNTER — OFFICE VISIT (OUTPATIENT)
Dept: PODIATRY | Facility: CLINIC | Age: 55
End: 2020-03-31
Payer: MEDICARE

## 2020-03-31 VITALS
HEART RATE: 96 BPM | BODY MASS INDEX: 45.14 KG/M2 | WEIGHT: 270.94 LBS | HEIGHT: 65 IN | SYSTOLIC BLOOD PRESSURE: 116 MMHG | DIASTOLIC BLOOD PRESSURE: 71 MMHG

## 2020-03-31 DIAGNOSIS — B35.1 DERMATOPHYTOSIS OF NAIL: ICD-10-CM

## 2020-03-31 DIAGNOSIS — E11.40 TYPE 2 DIABETES MELLITUS WITH DIABETIC NEUROPATHY, UNSPECIFIED WHETHER LONG TERM INSULIN USE: Primary | ICD-10-CM

## 2020-03-31 PROCEDURE — 99999 PR PBB SHADOW E&M-EST. PATIENT-LVL III: CPT | Mod: PBBFAC,,, | Performed by: PODIATRIST

## 2020-03-31 PROCEDURE — 99499 UNLISTED E&M SERVICE: CPT | Mod: S$PBB,,, | Performed by: PODIATRIST

## 2020-03-31 PROCEDURE — 99213 OFFICE O/P EST LOW 20 MIN: CPT | Mod: PBBFAC,PN | Performed by: PODIATRIST

## 2020-03-31 PROCEDURE — 11721 ROUTINE FOOT CARE: ICD-10-PCS | Mod: S$PBB,,, | Performed by: PODIATRIST

## 2020-03-31 PROCEDURE — 99499 NO LOS: ICD-10-PCS | Mod: S$PBB,,, | Performed by: PODIATRIST

## 2020-03-31 PROCEDURE — 11721 DEBRIDE NAIL 6 OR MORE: CPT | Mod: PBBFAC,PN | Performed by: PODIATRIST

## 2020-03-31 PROCEDURE — 99999 PR PBB SHADOW E&M-EST. PATIENT-LVL III: ICD-10-PCS | Mod: PBBFAC,,, | Performed by: PODIATRIST

## 2020-03-31 NOTE — PROGRESS NOTES
"Chief Complaint   Patient presents with    Routine Foot Care           HPI:   The patient is a 54 y.o.  female  who presents for a diabetic foot exam.   Patient reports + presence of abnormal sensation to the feet, just sometimes, mostly at night.   Patient has no history of wounds on the feet.   Hx of foot surgery: none.   Shoe gear: casual shoes   This patient has documented high risk feet requiring routine maintenance secondary to diabetes.    Main concern today is need for toenail trimming. Routine trimming helps.  Foot exam due 5/2020            Primary care doctor is: Clarisse Richardson MD  Patient last saw primary care doctor on:    Chief Complaint   Patient presents with    Routine Foot Care           Vitals:    03/31/20 1220   BP: 116/71   Pulse: 96   Weight: 122.9 kg (270 lb 15.1 oz)   Height: 5' 5" (1.651 m)   PainSc: 0-No pain             Past Medical History:   Diagnosis Date    Allergy     Anemia     Asthma     Bilateral shoulder bursitis     Cervical stenosis of spine     Chronic pain     DDD (degenerative disc disease), cervical     Depression 1/28/2019    Diabetes mellitus type II     DJD (degenerative joint disease) of knee 6/19/2014    Facet arthritis of lumbar region 12/17/2015    Facet syndrome 12/17/2015    GERD (gastroesophageal reflux disease)     Heartburn     Hyperlipidemia     Hypertension     Morbid obesity     Neuromuscular disorder     PADDY (obstructive sleep apnea)     Proteinuria     Right carpal tunnel syndrome     Sacroiliitis 6/13/2018    Sacroiliitis     Sleep apnea          Current Outpatient Medications on File Prior to Visit   Medication Sig Dispense Refill    albuterol (VENTOLIN HFA) 90 mcg/actuation inhaler INHALE TWO PUFFS INTO LUNGS EVERY 4 TO 6 HOURS AS NEEDED FOR SHORTNESS OF BREATH AND FOR WHEEZING 18 each 0    allopurinoL (ZYLOPRIM) 300 MG tablet Take 1 tablet (300 mg total) by mouth 2 (two) times daily. 180 tablet 3    atorvastatin " (LIPITOR) 20 MG tablet Take 1 tablet (20 mg total) by mouth once daily. 90 tablet 3    b complex vitamins tablet Take 1 tablet by mouth every other day.       calcium citrate/vitamin D2 (ISIAH-CITRATE ORAL) Take 500 mg by mouth 2 (two) times daily.      cyanocobalamin (VITAMIN B-12) 1000 MCG tablet Take 100 mcg by mouth every morning.      FLUoxetine (PROZAC) 20 mg/5 mL (4 mg/mL) solution Take 10 mLs (40 mg total) by mouth once daily. 300 mL 6    fluticasone (FLONASE) 50 mcg/actuation nasal spray 1 spray by Each Nare route 2 (two) times daily as needed for Rhinitis. 15 g 0    gabapentin (NEURONTIN) 300 MG capsule Take 1 pill QHS for 7 days then take 1 pill BID for 7 days then take pill TID 90 capsule 1    levoFLOXacin (LEVAQUIN) 250 mg/10 mL Soln Take 20 mLs (500 mg total) by mouth once daily. 200 mL 0    levoFLOXacin (LEVAQUIN) 500 MG tablet Take 1 tablet (500 mg total) by mouth once daily. 10 tablet 0    MULTIVIT-IRON-MIN-FOLIC ACID 3,500-18-0.4 UNIT-MG-MG ORAL CHEW Take 1 tablet by mouth 2 (two) times daily.       omeprazole (PRILOSEC) 20 MG capsule Take 1 capsule (20 mg total) by mouth every morning. 90 capsule 3    ondansetron (ZOFRAN) 4 MG tablet Take 1 tablet (4 mg total) by mouth every 6 (six) hours as needed. 20 tablet 0    oxyCODONE-acetaminophen (PERCOCET)  mg per tablet Take 1 tablet by mouth every 8 (eight) hours as needed for Pain. Greater than 7 day quantity medically necessary 90 tablet 0    vitamin D 185 MG Tab Take 5,000 mg by mouth every morning.        No current facility-administered medications on file prior to visit.        Review of patient's allergies indicates:  No Known Allergies          Social History     Socioeconomic History    Marital status:      Spouse name: Not on file    Number of children: Not on file    Years of education: Not on file    Highest education level: Not on file   Occupational History    Not on file   Social Needs    Financial resource  strain: Not on file    Food insecurity:     Worry: Not on file     Inability: Not on file    Transportation needs:     Medical: Not on file     Non-medical: Not on file   Tobacco Use    Smoking status: Never Smoker    Smokeless tobacco: Never Used   Substance and Sexual Activity    Alcohol use: Not Currently     Comment: occasionally     Drug use: No    Sexual activity: Yes     Partners: Female     Birth control/protection: None   Lifestyle    Physical activity:     Days per week: Not on file     Minutes per session: Not on file    Stress: Not on file   Relationships    Social connections:     Talks on phone: Not on file     Gets together: Not on file     Attends [employer]ars[employer] service: Not on file     Active member of club or organization: Not on file     Attends meetings of clubs or organizations: Not on file     Relationship status: Not on file   Other Topics Concern    Not on file   Social History Narrative    Disabled. The patient is the youngest of 6 siblings. Single. Lives with single-sex partner.                        ROS:  General ROS: negative  Respiratory ROS: no cough, shortness of breath, or wheezing  Cardiovascular ROS: no chest pain or dyspnea on exertion  Musculoskeletal ROS: negative  Neurological ROS:   negative for - gait disturbance, + numbness/tingling, seizures or tremors  Dermatological ROS: + nail changes, dry skin          LAST HbA1c:   Hemoglobin A1C   Date Value Ref Range Status   01/02/2020 6.6 (H) 4.0 - 5.6 % Final     Comment:     ADA Screening Guidelines:  5.7-6.4%  Consistent with prediabetes  >or=6.5%  Consistent with diabetes  High levels of fetal hemoglobin interfere with the HbA1C  assay. Heterozygous hemoglobin variants (HbS, HgC, etc)do  not significantly interfere with this assay.   However, presence of multiple variants may affect accuracy.     09/20/2019 6.4 (H) 4.0 - 5.6 % Final     Comment:     ADA Screening Guidelines:  5.7-6.4%  Consistent with  "prediabetes  >or=6.5%  Consistent with diabetes  High levels of fetal hemoglobin interfere with the HbA1C  assay. Heterozygous hemoglobin variants (HbS, HgC, etc)do  not significantly interfere with this assay.   However, presence of multiple variants may affect accuracy.     08/12/2019 6.9 (H) 4.0 - 5.6 % Final     Comment:     ADA Screening Guidelines:  5.7-6.4%  Consistent with prediabetes  >or=6.5%  Consistent with diabetes  High levels of fetal hemoglobin interfere with the HbA1C  assay. Heterozygous hemoglobin variants (HbS, HgC, etc)do  not significantly interfere with this assay.   However, presence of multiple variants may affect accuracy.           EXAM:   Vitals:    03/31/20 1220   BP: 116/71   Pulse: 96   Weight: 122.9 kg (270 lb 15.1 oz)   Height: 5' 5" (1.651 m)       General: Pt. is well-developed, well-nourished, appears stated age, in no acute distress, alert and oriented x 3.      Vascular: Dorsalis pedis and posterior tibial pulses are 2/4 bilaterally. 3 secs capillary refill time and toes are warm to touch.    There is reduced hair growth on the feet.    There is 1+ edema.  no varicosities.      Neurological:  Light touch, proprioception, and sharp/dull sensation are all intact bilaterally. Protective threshold with the Sherrill-Lindsay monofilament is diminished bilaterally.   +paresthesias      Dermatological:  The skin is thin    The toenails  1-5 L and 1-5 R  are greenish- yellow, long by 5-6mm and thickened by 2-3mm with subungual debris  .   There are no open wounds. There is no ecchymoses.  no erythema noted.   Skin diffusely dry on the soles     Interdigital spaces are intact, no fissures    Skin atrophic, thin, dry and mildly hyperpigmented.     Musculoskeletal:  Muscle strength is 5/5 in all groups bilaterally.  Metatarsophalangeal, subtalar, and ankle range of motion are intact without crepitus.           Assessment / Plan:    Problem List Items Addressed This Visit     None      Visit " Diagnoses     Type 2 diabetes mellitus with diabetic neuropathy, unspecified whether long term insulin use    -  Primary    Relevant Orders    Routine Foot Care    Dermatophytosis of nail        Relevant Orders    Routine Foot Care          I counseled the patient on the patient's conditions, their implications and medical management.     Shoe inspection. Diabetic Foot Education. Patient reminded of the importance of good nutrition and blood sugar control to help prevent podiatric complications of diabetes. Patient instructed on proper foot hygiene. We discussed wearing proper shoe gear, daily foot inspections, never walking without protective shoe gear, never putting sharp instruments to feet, and routine diabetic foot exam and care every 3-4 months to prevent complications.      Discussed regular and routine moisturizer to skin of both feet to help improve dry skin. Advised to apply twice daily until resolution of symptoms. Avoid between toes.         Routine Foot Care  Date/Time: 3/31/2020 11:45 AM  Performed by: Stacey Rowland DPM  Authorized by: Stacey Rowland DPM     Consent Done?:  Yes (Verbal)    Nail Care Type:  Debride  Location(s): All  (Left 1st Toe, Left 3rd Toe, Left 2nd Toe, Left 4th Toe, Left 5th Toe, Right 1st Toe, Right 2nd Toe, Right 3rd Toe, Right 4th Toe and Right 5th Toe)  Patient tolerance:  Patient tolerated the procedure well with no immediate complications     With patient's permission, the toenails mentioned above were aggressively reduced and debrided using a nail nipper, removing all offending nail and debris. The patient will continue to monitor the areas daily, inspect the feet, wear protective shoe gear when ambulatory, and moisturizer to maintain skin integrity.                This patient has documented high risk feet requiring routine maintenance secondary to diabetes     follow up 3-4 months or sooner if concerned.

## 2020-04-08 DIAGNOSIS — M17.0 PRIMARY OSTEOARTHRITIS OF BOTH KNEES: ICD-10-CM

## 2020-04-08 DIAGNOSIS — M25.561 BILATERAL CHRONIC KNEE PAIN: ICD-10-CM

## 2020-04-08 DIAGNOSIS — G89.29 BILATERAL CHRONIC KNEE PAIN: ICD-10-CM

## 2020-04-08 DIAGNOSIS — M51.36 DDD (DEGENERATIVE DISC DISEASE), LUMBAR: ICD-10-CM

## 2020-04-08 DIAGNOSIS — M47.816 SPONDYLOSIS OF LUMBAR REGION WITHOUT MYELOPATHY OR RADICULOPATHY: ICD-10-CM

## 2020-04-08 DIAGNOSIS — Z79.891 ENCOUNTER FOR LONG-TERM OPIATE ANALGESIC USE: ICD-10-CM

## 2020-04-08 DIAGNOSIS — M47.816 FACET ARTHRITIS OF LUMBAR REGION: ICD-10-CM

## 2020-04-08 DIAGNOSIS — G89.4 CHRONIC PAIN DISORDER: Primary | ICD-10-CM

## 2020-04-08 DIAGNOSIS — M25.562 BILATERAL CHRONIC KNEE PAIN: ICD-10-CM

## 2020-04-08 DIAGNOSIS — Z96.651 STATUS POST TOTAL RIGHT KNEE REPLACEMENT: ICD-10-CM

## 2020-04-08 RX ORDER — OXYCODONE AND ACETAMINOPHEN 10; 325 MG/1; MG/1
1 TABLET ORAL EVERY 8 HOURS PRN
Qty: 90 TABLET | Refills: 0 | Status: SHIPPED | OUTPATIENT
Start: 2020-04-10 | End: 2020-05-11 | Stop reason: SDUPTHER

## 2020-04-08 NOTE — TELEPHONE ENCOUNTER
----- Message from Tracy Gilmore sent at 4/8/2020  8:42 AM CDT -----  Contact: DONTAE MOON      Can the clinic reply in [facility]: No      Please refill the medication(s) listed below. Please call the patient when the prescription(s) is ready for  at this phone number   [phone]        Medication #1 oxyCODONE-acetaminophen (PERCOCET)  mg per tablet      Preferred Pharmacy: [facility] [address]

## 2020-04-08 NOTE — TELEPHONE ENCOUNTER
Patient was last seen in the office 03/12/20 by Adeola Robledo. This is a  patient. Patient last received this medication 03/11/20. Patient has a pain contract on file. Patient completed UDS 10/16/19 and was consistent. Patient has no follow up scheduled

## 2020-04-15 ENCOUNTER — TELEPHONE (OUTPATIENT)
Dept: PAIN MEDICINE | Facility: CLINIC | Age: 55
End: 2020-04-15

## 2020-04-15 ENCOUNTER — OFFICE VISIT (OUTPATIENT)
Dept: PAIN MEDICINE | Facility: CLINIC | Age: 55
End: 2020-04-15
Payer: MEDICARE

## 2020-04-15 DIAGNOSIS — G89.29 CHRONIC PAIN OF RIGHT KNEE: ICD-10-CM

## 2020-04-15 DIAGNOSIS — Z79.891 ENCOUNTER FOR LONG-TERM OPIATE ANALGESIC USE: ICD-10-CM

## 2020-04-15 DIAGNOSIS — M47.816 LUMBAR SPONDYLOSIS: Primary | ICD-10-CM

## 2020-04-15 DIAGNOSIS — M25.562 CHRONIC PAIN OF LEFT KNEE: ICD-10-CM

## 2020-04-15 DIAGNOSIS — G89.4 CHRONIC PAIN SYNDROME: ICD-10-CM

## 2020-04-15 DIAGNOSIS — M25.561 CHRONIC PAIN OF RIGHT KNEE: ICD-10-CM

## 2020-04-15 DIAGNOSIS — M79.18 MYOFASCIAL PAIN: ICD-10-CM

## 2020-04-15 DIAGNOSIS — G89.29 CHRONIC PAIN OF LEFT KNEE: ICD-10-CM

## 2020-04-15 PROCEDURE — 99443 PR PHYSICIAN TELEPHONE EVALUATION 21-30 MIN: CPT | Mod: 95,,, | Performed by: NURSE PRACTITIONER

## 2020-04-15 PROCEDURE — 99443 PR PHYSICIAN TELEPHONE EVALUATION 21-30 MIN: ICD-10-PCS | Mod: 95,,, | Performed by: NURSE PRACTITIONER

## 2020-04-15 NOTE — PROGRESS NOTES
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 post right total knee replacement on 11/4/2014 and has been healed with postoperative visits showing good progress.  The patient does have continued physical therapy sessions and has been attempting to lose weight and has lost approximately 25 pounds although her BMI continues to be 54.  She has been making good efforts to try and continue to increase her range of motion and lose weight.  She continues to have significant pain in bilateral knees and continues to use a cane for ambulation.  She was receiving oxycodone/acetaminophen 10/325 every 8 hours by mouth when necessary and requiring in order to persist in her physical therapy although the topical pain cream that she has been applying has been helping her to a limited degree she still requires the medication regularly.  Her recent x-ray imaging shows good positioning of the prosthesis.  No other health changes since previous encounter.     Interval history 2/17/2014:  Patient reports that she has been using the topical compounded cream on the knee approximately 4 times per day and she states that it does help her with her pain that she is experiencing.  She states that she has not gone to formal physical therapy but that she has been going to a pool that has exercise classes which is free for her and that she feels like it is helping her  continue to lose weight.  Additionally she continues using hydrocodone/acetaminophen 7.5/750 approximately 3 times per day when necessary and states that also helps with her pain symptoms.  He she's still talks to have replacement for the left knee, but would like to lose weight further before going to that.  She has also had previous injections into her knees which have offered little to no relief in her pain symptoms.  She has had no other health changes since previous encounter.    Previous encounter 1/21/2014:  HPI Comments: 49 yo female presents for initial evaluation of bilateral knee pain, L>R. She is s/p arthroscopic right knee surgery and eventual replacement and then revision surgeries (Dr. Swan, Orthopedics). The pain is present in both knees and is described as a terrible ache. She hears occasional popping in both knees with movement. The pain is worse with being on her feet and getting up from a sitting to standing position. Denies lower ext weakness or paresthesias. She does not have back pain or pain radiating down her legs. The pain is better with Celebrex and rest. She also takes Vicodin ES TID but this makes her very sleepy. She is not sure it helps with the pain because she usually falls asleep.     Physical Therapy: not since 2011 just prior to her 3rd right knee surgery (revision after replacement); has tried swimming which helps with weight loss    Non-pharmacologic Treatment: none    Pain Medications: Percocet and celebrex    Blood thinners: ASA 81 mg daily     Interventional Therapies:   steroid inj and visco-supplementation (series of 3) in left knee- not helpful  3/31/14 Bilateral genicular nerve blocks  2/16/16 Bilateral L2,3,4,5 MBB  3/1/16 Bilateral L2,3,4,5 MBB   4/6/16 Left L2,3,4,5 RFA- significant relief  4/20/16 Right L2,3,4,5 RFA- significant relief  10/5/16 Left L2,3,4,5 cooled RFA  10/19/16 Right L2,3,4,5 cooled RFA  4/26/17 Left L2,3,4,5 cooled RFA  5/9/17 Right L2,3,4,5  cooled RFA  12/26/2017- Left L2,3,4,5 cooled RFA  1/9/2018- Right L2,3,4,5 cooled RFA  6/26/18 Left L2,3,4,5 cooled RFA- 60% relief  7/10/18 Right L2,3,4,5 cooled RFA- 60% relief  9/28/18 TPIs- significant relief  12/27/18 Left L2,3,4,5 RFA- 70% relief  1/29/19 Right L2,3,4,5 RFA- 70% relief  6/18/19 Left L2,3,4,5 RFA- 70% relief  7/9/19 Right L2,3,4,5 RFA- 50% relief  12/19/19 Left L2,3,4,5 RFA- 80% relief  12/31/19 Right L2,3,4,5 RFA- 50% relief  3/12/20 Left subacromial bursa injection- 90% relief    Relevant Surgeries: right knee surgery x3 (arthroscopic, then replacement and subsequent revision surgery), s/p left total knee arthroplasty    Relevant Imaging:  Xray Lumbar spine 09/21/2015  Lumbar spine radiograph    Comparison: None    Results: AP, lateral neutral, lateral flexion , lateral extension, bilateral oblique and spot views. The alignment of the lumbar spine demonstrates a mild levoscoliosis . 11-mm anterior listhesis of L4 relative to L5 with no translational abnormalities  seen on flexion and extension views. The vertebral body heights are well-maintained , mild disk space narrowing L4-L5 and L5-S1. Mild anterior and marginal osteophyte formation seen throughout the lumbar spine . The oblique views demonstrate no   definite spondylolysis. There is facet joint osseous hypertrophy noted at L3-L4 and L4-L5.      Impression         The Significant spondylosis of the lumbar spine with grade 1 anterior listhesis L4 relative to L5.  Facet joint osseous hypertrophy L3-L4 and L4-5.      Electronically signed by: BLESSING ROE MD  Date: 09/21/15  Time: 09:56          X-ray knee bilateral 8/26/2015:  Standing AP knees, lateral view of both knees and sunrise view of both patella. Study compared to May 2015. Postop change of bilateral knee replacement. The prosthetic components are in satisfactory position. Erosive changes involving the patella   again evident bilaterally.    Impression no significant  change.      Xray Bilateral Knee 05/25/2015:  There are bilateral total knee arthroplasties with posterior resurfacing of the patella. As observed on 12/17/2014 there is a fracture of the left patella in the parasagittal plane.    Heterotopic bone is evident about each knee.    I detect no dislocation, unusual radiopaque retained foreign body, lytic or blastic lesion, or chondrocalcinosis.    Cervical MRI 4/24/2018:    Narrative     EXAMINATION:  MRI CERVICAL SPINE WITHOUT CONTRAST    CLINICAL HISTORY:  Neck pain, first study;.  Cervicalgia.    TECHNIQUE:  Multiplanar, multisequence MR images of the cervical spine were acquired without the administration of contrast.    COMPARISON:  None.    FINDINGS:  There is reversal of the normal cervical lordosis which could be related to patient positioning versus muscle spasm.    Cervical vertebral body heights are well maintained without evidence of acute fracture or dislocation.  Marrow signal is unremarkable.    Spinal canal contents are unremarkable.  No abnormal masses or collections.    Individual levels as detailed below:    C2-3: No significant spinal canal stenosis or neuroforaminal narrowing.    C3-4: Facet arthropathy.  No significant spinal canal stenosis or neuroforaminal narrowing.    C4-5: Facet arthropathy.  Small broad-based disc osteophyte complex.  Mild right neuroforaminal narrowing.  Mild spinal canal stenosis.    C5-6: Facet arthropathy.  Uncovertebral joint spurring.  Broad-based posterior disc osteophyte complex.  Mild right neuroforaminal narrowing.  Mild spinal canal stenosis.    C6-7: Facet arthropathy.  No significant spinal canal stenosis or neuroforaminal narrowing.    C7-T1: No significant spinal canal stenosis or neuroforaminal narrowing.    Paraspinal muscles are unremarkable.  Soft tissues are intact.   Impression       Mild multilevel cervical spondylosis with mild spinal canal stenosis and mild right neuroforaminal narrowing at C4-5 and  C5-6.       Lab Results   Component Value Date    HGBA1C 6.6 (H) 01/02/2020     Lab Results   Component Value Date    CHOL 152 03/04/2020    CHOL 160 01/02/2020    CHOL 221 (H) 11/14/2019     Lab Results   Component Value Date    HDL 51 03/04/2020    HDL 49 01/02/2020    HDL 46 11/14/2019     Lab Results   Component Value Date    LDLCALC 88.6 03/04/2020    LDLCALC 97.8 01/02/2020    LDLCALC 157.0 11/14/2019     Lab Results   Component Value Date    TRIG 62 03/04/2020    TRIG 66 01/02/2020    TRIG 90 11/14/2019     Lab Results   Component Value Date    CHOLHDL 33.6 03/04/2020    CHOLHDL 30.6 01/02/2020    CHOLHDL 20.8 11/14/2019         Past Medical History:   Diagnosis Date    Allergy     Anemia     Asthma     Bilateral shoulder bursitis     Cervical stenosis of spine     Chronic pain     DDD (degenerative disc disease), cervical     Depression 1/28/2019    Diabetes mellitus type II     DJD (degenerative joint disease) of knee 6/19/2014    Facet arthritis of lumbar region 12/17/2015    Facet syndrome 12/17/2015    GERD (gastroesophageal reflux disease)     Heartburn     Hyperlipidemia     Hypertension     Morbid obesity     Neuromuscular disorder     PADDY (obstructive sleep apnea)     Proteinuria     Right carpal tunnel syndrome     Sacroiliitis 6/13/2018    Sacroiliitis     Sleep apnea      Past Surgical History:   Procedure Laterality Date    CARPAL TUNNEL RELEASE      CARPAL TUNNEL RELEASE  1980s    left    CARPAL TUNNEL RELEASE  2012    right    ESOPHAGOGASTRODUODENOSCOPY N/A 3/27/2019    Procedure: EGD (ESOPHAGOGASTRODUODENOSCOPY);  Surgeon: Robbin Bar MD;  Location: [address]);  Service: Endoscopy;  Laterality: N/A;  BMI 61.3/2nd floor case/svn    JOINT REPLACEMENT Bilateral     with 2 revisions on rt    KNEE SURGERY  3/2010    orthroscope    KNEE SURGERY  6-19-14    left TKR    LAPAROSCOPIC SLEEVE GASTRECTOMY N/A 8/28/2019    Procedure: GASTRECTOMY, SLEEVE,  LAPAROSCOPIC with intraop EGD;  Surgeon: Heriberto Clements MD;  Location: [facility] OR 2ND FLR;  Service: General;  Laterality: N/A;    RADIOFREQUENCY ABLATION Right 7/9/2019    Procedure: RADIOFREQUENCY ABLATION;  Surgeon: Lina Wagner MD;  [place]: [facility], [place] [facility];  Service: Pain Management;  Laterality: Right;  RIGHT RFA L2,3,4,5  2 of 2    RADIOFREQUENCY ABLATION Left 12/19/2019    Procedure: RADIOFREQUENCY ABLATION, LEFT L2-L3-L4-L5 MEDIAL BRANCH 1 OF 2;  Surgeon: Lina Wagner MD;  Location: [facility], [place] [place] [facility];  Service: Pain Management;  Laterality: Left;    RADIOFREQUENCY ABLATION Right 12/31/2019    Procedure: RADIOFREQUENCY ABLATION, RIGHT L2-L3-L4-L5  2 OF 2;  Surgeon: Lina Wagner MD;  Location: [facility], [place];  Service: Pain Management;  Laterality: Right;    RADIOFREQUENCY ABLATION OF LUMBAR MEDIAL BRANCH NERVE AT SINGLE LEVEL Left 6/26/2018    Procedure: RADIOFREQUENCY ABLATION, NERVE, MEDIAL BRANCH, LUMBAR, 1 LEVEL;  Surgeon: Lina Wagner MD;  Location: [facility], [place];  Service: Pain Management;  Laterality: Left;  Left Cooled RFA @ L2,3,4,5  99091-39488  with Sedation    1 of 2    RADIOFREQUENCY ABLATION OF LUMBAR MEDIAL BRANCH NERVE AT SINGLE LEVEL Right 7/10/2018    Procedure: RADIOFREQUENCY ABLATION, NERVE, MEDIAL BRANCH, LUMBAR, 1 LEVEL;  Surgeon: Lina Wagner MD;  Location: [facility], [place], [facility];  Service: Pain Management;  Laterality: Right;  RIght Cooled RFA @ L2,3,4,5  95384-[address] with Sedation    2 of 2    TRIGGER FINGER RELEASE Right 2017    TRIGGER FINGER RELEASE Left 7/29/2019    Procedure: RELEASE, TRIGGER FINGER, Left Thumb;  Surgeon: Velma Garcia MD;  Location: [facility], [place];  Service: Orthopedics;  Laterality: Left;  Local w/ MAC     Family History   Problem Relation Age of Onset    Diabetes Mother     Cataracts Mother     Diabetes Father     Cataracts Father     Coronary artery disease Brother     Diabetes Brother     Hypertension Sister     Diabetes  Sister     No Known Problems Sister     Cancer Sister         lymphoma     Diabetes Brother     Hypotension Brother     Kidney failure Brother     Hypotension Brother     Amblyopia Neg Hx     Blindness Neg Hx     Glaucoma Neg Hx     Macular degeneration Neg Hx     Retinal detachment Neg Hx     Strabismus Neg Hx     Stroke Neg Hx     Thyroid disease Neg Hx      Social History     Socioeconomic History    Marital status:      Spouse name: Not on file    Number of children: Not on file    Years of education: Not on file    Highest education level: Not on file   Occupational History    Not on file   Social Needs    Financial resource strain: Not on file    Food insecurity:     Worry: Not on file     Inability: Not on file    Transportation needs:     Medical: Not on file     Non-medical: Not on file   Tobacco Use    Smoking status: Never Smoker    Smokeless tobacco: Never Used   Substance and Sexual Activity    Alcohol use: Not Currently     Comment: occasionally     Drug use: No    Sexual activity: Yes     Partners: Female     Birth control/protection: None   Lifestyle    Physical activity:     Days per week: Not on file     Minutes per session: Not on file    Stress: Not on file   Relationships    Social connections:     Talks on phone: Not on file     Gets together: Not on file     Attends Hindu service: Not on file     Active member of club or organization: Not on file     Attends meetings of clubs or organizations: Not on file     Relationship status: Not on file   Other Topics Concern    Not on file   Social History Narrative    Disabled. The patient is the youngest of 6 siblings. Single. Lives with single-sex partner.                  Review of patient's allergies indicates:  No Known Allergies    Medication List with Changes/Refills   Current Medications    ALBUTEROL (VENTOLIN HFA) 90 MCG/ACTUATION INHALER    INHALE TWO PUFFS INTO LUNGS EVERY 4 TO 6 HOURS AS NEEDED FOR  SHORTNESS OF BREATH AND FOR WHEEZING    ALLOPURINOL (ZYLOPRIM) 300 MG TABLET    Take 1 tablet (300 mg total) by mouth 2 (two) times daily.    ATORVASTATIN (LIPITOR) 20 MG TABLET    Take 1 tablet (20 mg total) by mouth once daily.    B COMPLEX VITAMINS TABLET    Take 1 tablet by mouth every other day.     CALCIUM CITRATE/VITAMIN D2 (ISIAH-CITRATE ORAL)    Take 500 mg by mouth 2 (two) times daily.    CYANOCOBALAMIN (VITAMIN B-12) 1000 MCG TABLET    Take 100 mcg by mouth every morning.    FLUOXETINE (PROZAC) 20 MG/5 ML (4 MG/ML) SOLUTION    Take 10 mLs (40 mg total) by mouth once daily.    FLUTICASONE (FLONASE) 50 MCG/ACTUATION NASAL SPRAY    1 spray by Each Nare route 2 (two) times daily as needed for Rhinitis.    GABAPENTIN (NEURONTIN) 300 MG CAPSULE    Take 1 pill QHS for 7 days then take 1 pill BID for 7 days then take pill TID    LEVOFLOXACIN (LEVAQUIN) 250 MG/10 ML SOLN    Take 20 mLs (500 mg total) by mouth once daily.    LEVOFLOXACIN (LEVAQUIN) 500 MG TABLET    Take 1 tablet (500 mg total) by mouth once daily.    MULTIVIT-IRON-MIN-FOLIC ACID 3,500-18-0.4 UNIT-MG-MG ORAL CHEW    Take 1 tablet by mouth 2 (two) times daily.     OMEPRAZOLE (PRILOSEC) 20 MG CAPSULE    Take 1 capsule (20 mg total) by mouth every morning.    ONDANSETRON (ZOFRAN) 4 MG TABLET    Take 1 tablet (4 mg total) by mouth every 6 (six) hours as needed.    OXYCODONE-ACETAMINOPHEN (PERCOCET)  MG PER TABLET    Take 1 tablet by mouth every 8 (eight) hours as needed for Pain. Greater than 7 day quantity medically necessary    VITAMIN D 185 MG TAB    Take 5,000 mg by mouth every morning.          REVIEW OF SYSTEMS:    GENERAL:  Recent weight loss.  RESPIRATORY:  Negative for cough, wheezing or shortness of breath, patient denies any recent URI.  CARDIOVASCULAR:  Negative for chest pain, leg swelling or palpitations.  GI:  Negative for abdominal discomfort, blood in stools or black stools or change in bowel habits, occasional  constipation.  MUSCULOSKELETAL:  See HPI.  SKIN:  Negative for lesions, rash, and itching.  PSYCH:  No mood disorder or recent psychosocial stressors.  Patient's sleep is disturbed secondary to pain (patient reports also that she has insomnia).  HEMATOLOGY/LYMPHOLOGY:  Negative for prolonged bleeding, bruising easily or swollen nodes.  81 mg aspirin  ENDO: Patient has a history of diabetes.  NEURO:   No history of headaches, syncope, paralysis, seizures or tremors.  All other reviewed and negative other than HPI.    OBJECTIVE:    [facility] 06/26/2018     PHYSICAL EXAMINATION:    PHYSICAL EXAMINATION:  Voice normal.  Normal affect without evident distress of distress.        Assessment:       Encounter Diagnoses   Name Primary?    Lumbar spondylosis Yes    Chronic pain of right knee     Myofascial pain     Chronic pain syndrome     Encounter for long-term opiate analgesic use          Plan:       - Previous imaging was reviewed and discussed with the patient today.     - Continue Percocet 10/325 mg TID PRN. She does not need a refill today.    - Will schedule for left genicular nerve block in office with Dr. Wagner when possible.  Consent filled out today.    - RTC in 1 month or sooner if needed.      The above plan and management options were discussed at length with patient. Patient is in agreement with the above and verbalized understanding.     Virginia Sanchez, EMILY  04/15/2020

## 2020-04-15 NOTE — TELEPHONE ENCOUNTER
"----- Message from Angela Forrester sent at 4/15/2020  8:10 AM CDT -----  Contact: DONTAE MOON [[id number]]  Name of Who is Calling: DONTAE MOON [[id number]]    What is the request in detail:   Patient called stating, "she's up and waiting for her VIRTUAL AUDIO ONLY call."   Please advise.    THANKS!      Call Back: DONTAE MOON // ph# [phone]                                    "

## 2020-04-17 ENCOUNTER — TELEPHONE (OUTPATIENT)
Dept: ORTHOPEDICS | Facility: CLINIC | Age: 55
End: 2020-04-17

## 2020-04-27 ENCOUNTER — LAB VISIT (OUTPATIENT)
Dept: LAB | Facility: HOSPITAL | Age: 55
End: 2020-04-27
Attending: INTERNAL MEDICINE
Payer: MEDICARE

## 2020-04-27 DIAGNOSIS — E11.65 UNCONTROLLED TYPE 2 DIABETES MELLITUS WITH HYPERGLYCEMIA: ICD-10-CM

## 2020-04-27 DIAGNOSIS — M10.9 GOUT, UNSPECIFIED CAUSE, UNSPECIFIED CHRONICITY, UNSPECIFIED SITE: ICD-10-CM

## 2020-04-27 LAB
CRP SERPL-MCNC: 1.8 MG/L (ref 0–8.2)
ERYTHROCYTE [SEDIMENTATION RATE] IN BLOOD BY WESTERGREN METHOD: 35 MM/HR (ref 0–36)
ESTIMATED AVG GLUCOSE: 137 MG/DL (ref 68–131)
HBA1C MFR BLD HPLC: 6.4 % (ref 4–5.6)
URATE SERPL-MCNC: 2.8 MG/DL (ref 2.4–5.7)

## 2020-04-27 PROCEDURE — 84550 ASSAY OF BLOOD/URIC ACID: CPT

## 2020-04-27 PROCEDURE — 83036 HEMOGLOBIN GLYCOSYLATED A1C: CPT

## 2020-04-27 PROCEDURE — 86140 C-REACTIVE PROTEIN: CPT

## 2020-04-27 PROCEDURE — 36415 COLL VENOUS BLD VENIPUNCTURE: CPT | Mod: PN

## 2020-04-27 PROCEDURE — 85652 RBC SED RATE AUTOMATED: CPT

## 2020-04-29 ENCOUNTER — TELEPHONE (OUTPATIENT)
Dept: INTERNAL MEDICINE | Facility: CLINIC | Age: 55
End: 2020-04-29

## 2020-04-29 NOTE — TELEPHONE ENCOUNTER
----- Message from Clarisse Richardson MD sent at 4/29/2020  2:47 PM CDT -----  Yes she can.  ----- Message -----  From: Bert Stein LPN  Sent: 4/29/2020   8:56 AM CDT  To: Clarisse Richardson MD    Pt would like to know if she can go back to a high protein diet.

## 2020-05-11 DIAGNOSIS — G89.29 BILATERAL CHRONIC KNEE PAIN: ICD-10-CM

## 2020-05-11 DIAGNOSIS — G89.4 CHRONIC PAIN DISORDER: ICD-10-CM

## 2020-05-11 DIAGNOSIS — Z79.891 ENCOUNTER FOR LONG-TERM OPIATE ANALGESIC USE: ICD-10-CM

## 2020-05-11 DIAGNOSIS — M51.36 DDD (DEGENERATIVE DISC DISEASE), LUMBAR: ICD-10-CM

## 2020-05-11 DIAGNOSIS — M25.561 BILATERAL CHRONIC KNEE PAIN: ICD-10-CM

## 2020-05-11 DIAGNOSIS — M47.816 FACET ARTHRITIS OF LUMBAR REGION: ICD-10-CM

## 2020-05-11 DIAGNOSIS — M47.816 SPONDYLOSIS OF LUMBAR REGION WITHOUT MYELOPATHY OR RADICULOPATHY: Primary | ICD-10-CM

## 2020-05-11 DIAGNOSIS — M17.0 PRIMARY OSTEOARTHRITIS OF BOTH KNEES: ICD-10-CM

## 2020-05-11 DIAGNOSIS — Z96.651 STATUS POST TOTAL RIGHT KNEE REPLACEMENT: ICD-10-CM

## 2020-05-11 DIAGNOSIS — M25.562 BILATERAL CHRONIC KNEE PAIN: ICD-10-CM

## 2020-05-11 RX ORDER — OXYCODONE AND ACETAMINOPHEN 10; 325 MG/1; MG/1
1 TABLET ORAL EVERY 8 HOURS PRN
Qty: 90 TABLET | Refills: 0 | Status: SHIPPED | OUTPATIENT
Start: 2020-05-13 | End: 2020-06-12 | Stop reason: SDUPTHER

## 2020-05-11 NOTE — TELEPHONE ENCOUNTER
Patient completed audio visit with Virginia Sanchez 04/15/20. This is a  patient. Patient last received this medication 04/13/20. Patient completed UDS 10/16/19 and was consistent. Patient has a pain contract on file. Patient has a visit scheduled 05/18/20.

## 2020-05-14 DIAGNOSIS — K21.9 GASTROESOPHAGEAL REFLUX DISEASE WITHOUT ESOPHAGITIS: ICD-10-CM

## 2020-05-15 RX ORDER — OMEPRAZOLE 20 MG/1
20 CAPSULE, DELAYED RELEASE ORAL EVERY MORNING
Qty: 90 CAPSULE | Refills: 3 | Status: SHIPPED | OUTPATIENT
Start: 2020-05-15 | End: 2020-11-04 | Stop reason: SDUPTHER

## 2020-05-18 ENCOUNTER — PROCEDURE VISIT (OUTPATIENT)
Dept: PAIN MEDICINE | Facility: CLINIC | Age: 55
End: 2020-05-18
Attending: ANESTHESIOLOGY
Payer: MEDICARE

## 2020-05-18 ENCOUNTER — TELEPHONE (OUTPATIENT)
Dept: OBSTETRICS AND GYNECOLOGY | Facility: CLINIC | Age: 55
End: 2020-05-18

## 2020-05-18 VITALS
HEIGHT: 65 IN | BODY MASS INDEX: 42.61 KG/M2 | TEMPERATURE: 98 F | DIASTOLIC BLOOD PRESSURE: 87 MMHG | WEIGHT: 255.75 LBS | SYSTOLIC BLOOD PRESSURE: 143 MMHG | HEART RATE: 61 BPM | RESPIRATION RATE: 18 BRPM

## 2020-05-18 DIAGNOSIS — G89.29 CHRONIC LOW BACK PAIN WITHOUT SCIATICA, UNSPECIFIED BACK PAIN LATERALITY: ICD-10-CM

## 2020-05-18 DIAGNOSIS — M54.50 CHRONIC LOW BACK PAIN WITHOUT SCIATICA, UNSPECIFIED BACK PAIN LATERALITY: ICD-10-CM

## 2020-05-18 DIAGNOSIS — Z12.31 VISIT FOR SCREENING MAMMOGRAM: Primary | ICD-10-CM

## 2020-05-18 DIAGNOSIS — M25.562 CHRONIC PAIN OF BOTH KNEES: Primary | ICD-10-CM

## 2020-05-18 DIAGNOSIS — Z96.653 STATUS POST TOTAL BILATERAL KNEE REPLACEMENT: ICD-10-CM

## 2020-05-18 DIAGNOSIS — M53.3 SACROILIAC JOINT PAIN: ICD-10-CM

## 2020-05-18 DIAGNOSIS — G89.29 CHRONIC PAIN OF BOTH KNEES: Primary | ICD-10-CM

## 2020-05-18 DIAGNOSIS — Z96.659 FAILURE OF TOTAL KNEE REPLACEMENT, SUBSEQUENT ENCOUNTER: ICD-10-CM

## 2020-05-18 DIAGNOSIS — T84.018D FAILURE OF TOTAL KNEE REPLACEMENT, SUBSEQUENT ENCOUNTER: ICD-10-CM

## 2020-05-18 DIAGNOSIS — M25.561 CHRONIC PAIN OF BOTH KNEES: Primary | ICD-10-CM

## 2020-05-18 PROCEDURE — 64450 NJX AA&/STRD OTHER PN/BRANCH: CPT | Mod: S$PBB,LT,, | Performed by: ANESTHESIOLOGY

## 2020-05-18 PROCEDURE — 76942 ECHO GUIDE FOR BIOPSY: CPT | Mod: PBBFAC | Performed by: ANESTHESIOLOGY

## 2020-05-18 PROCEDURE — 64450 PR NERVE BLOCK INJ, ANES/STEROID, OTHER PERIPHERAL: ICD-10-PCS | Mod: S$PBB,LT,, | Performed by: ANESTHESIOLOGY

## 2020-05-18 PROCEDURE — 99213 PR OFFICE/OUTPT VISIT, EST, LEVL III, 20-29 MIN: ICD-10-PCS | Mod: 25,S$PBB,, | Performed by: ANESTHESIOLOGY

## 2020-05-18 PROCEDURE — 76942 ECHO GUIDE FOR BIOPSY: CPT | Mod: 26,S$PBB,, | Performed by: ANESTHESIOLOGY

## 2020-05-18 PROCEDURE — 76942 PR U/S GUIDANCE FOR NEEDLE GUIDANCE: ICD-10-PCS | Mod: 26,S$PBB,, | Performed by: ANESTHESIOLOGY

## 2020-05-18 PROCEDURE — 99213 OFFICE O/P EST LOW 20 MIN: CPT | Mod: 25,S$PBB,, | Performed by: ANESTHESIOLOGY

## 2020-05-18 PROCEDURE — 64450 NJX AA&/STRD OTHER PN/BRANCH: CPT | Mod: PBBFAC | Performed by: ANESTHESIOLOGY

## 2020-05-18 NOTE — TELEPHONE ENCOUNTER
----- Message from Rosi Cervantes sent at 5/18/2020 12:25 PM CDT -----  Contact: self  Type:  Mammogram    Caller is requesting to schedule their annual mammogram appointment.  Order is not listed in EPIC.  Please enter order and contact patient to schedule.  Name of Caller:patient   [facility] would they like the mamm[facility] performed [facility]  Would the patient rather a call back or a response via [facility]? call  Best Call Back Number: [phone]  Additional Information: patient need a Monday appointment

## 2020-05-18 NOTE — H&P (VIEW-ONLY)
HPI  Patient presenting for Left knee genicular nerve block under ultrasound     Patient on Anti-coagulation No     In addition to the knee pain the patient has been having lower back pain previously had her radiofrequency ablation in the lumbar spine in December.  She has been unable to continue aqua therapy secondary to the coronavirus outbreak.    She is having lower back pain at this time.    No health changes since previous encounter    Past Medical History:   Diagnosis Date    Allergy     Anemia     Asthma     Bilateral shoulder bursitis     Cervical stenosis of spine     Chronic pain     DDD (degenerative disc disease), cervical     Depression 1/28/2019    Diabetes mellitus type II     DJD (degenerative joint disease) of knee 6/19/2014    Facet arthritis of lumbar region 12/17/2015    Facet syndrome 12/17/2015    GERD (gastroesophageal reflux disease)     Heartburn     Hyperlipidemia     Hypertension     Morbid obesity     Neuromuscular disorder     PADDY (obstructive sleep apnea)     Proteinuria     Right carpal tunnel syndrome     Sacroiliitis 6/13/2018    Sacroiliitis     Sleep apnea      Past Surgical History:   Procedure Laterality Date    CARPAL TUNNEL RELEASE      CARPAL TUNNEL RELEASE  1980s    left    CARPAL TUNNEL RELEASE  2012    right    ESOPHAGOGASTRODUODENOSCOPY N/A 3/27/2019    Procedure: EGD (ESOPHAGOGASTRODUODENOSCOPY);  Surgeon: Robbin Bar MD;  Location: [facility] ([address] [place] [address]);  Service: Endoscopy;  Laterality: N/A;  BMI 61.3/2nd floor case/svn    JOINT REPLACEMENT Bilateral     with 2 revisions on rt    KNEE SURGERY  3/2010    orthroscope    KNEE SURGERY  6-19-14    left TKR    LAPAROSCOPIC SLEEVE GASTRECTOMY N/A 8/28/2019    Procedure: GASTRECTOMY, SLEEVE, LAPAROSCOPIC with intraop EGD;  Surgeon: Heriberto Clements MD;  Location: [address];  Service: General;  Laterality: N/A;    RADIOFREQUENCY ABLATION Right 7/9/2019    Procedure:  RADIOFREQUENCY ABLATION;  Surgeon: Lina Wagner MD;  Location: [facility];  Service: Pain Management;  Laterality: Right;  RIGHT RFA L2,3,4,5  2 of 2    RADIOFREQUENCY ABLATION Left 12/19/2019    Procedure: RADIOFREQUENCY ABLATION, LEFT L2-L3-L4-L5 MEDIAL BRANCH 1 OF 2;  Surgeon: Lina Wagner MD;  Location: [facility];  Service: Pain Management;  Laterality: Left;    RADIOFREQUENCY ABLATION Right 12/31/2019    Procedure: RADIOFREQUENCY ABLATION, RIGHT L2-L3-L4-L5  2 OF 2;  Surgeon: Lina Wagner MD;  Location: [facility];  Service: Pain Management;  Laterality: Right;    RADIOFREQUENCY ABLATION OF LUMBAR MEDIAL BRANCH NERVE AT SINGLE LEVEL Left 6/26/2018    Procedure: RADIOFREQUENCY ABLATION, NERVE, MEDIAL BRANCH, LUMBAR, 1 LEVEL;  Surgeon: Lina Wagner MD;  Location: [facility];  Service: Pain Management;  Laterality: Left;  Left Cooled RFA @ L2,3,4,5  [id number]  with Sedation    1 of 2    RADIOFREQUENCY ABLATION OF LUMBAR MEDIAL BRANCH NERVE AT SINGLE LEVEL Right 7/10/2018    Procedure: RADIOFREQUENCY ABLATION, NERVE, MEDIAL BRANCH, LUMBAR, 1 LEVEL;  Surgeon: Lina Wagner MD;  Location: [facility];  Service: Pain Management;  Laterality: Right;  RIght Cooled RFA @ L2,3,4,5  [id number] with Sedation    2 of 2    TRIGGER FINGER RELEASE Right 2017    TRIGGER FINGER RELEASE Left 7/29/2019    Procedure: RELEASE, TRIGGER FINGER, Left Thumb;  Surgeon: Velma Garcia MD;  Location: [facility] OR;  Service: Orthopedics;  Laterality: Left;  Local w/ MAC     Review of patient's allergies indicates:   Allergen Reactions    Strawberry Anaphylaxis      Current Outpatient Medications   Medication Sig    albuterol (VENTOLIN HFA) 90 mcg/actuation inhaler INHALE TWO PUFFS INTO LUNGS EVERY 4 TO 6 HOURS AS NEEDED FOR SHORTNESS OF BREATH AND FOR WHEEZING    allopurinoL (ZYLOPRIM) 300 MG tablet Take 1 tablet (300 mg total) by mouth 2 (two) times daily.    atorvastatin (LIPITOR)  "20 MG tablet Take 1 tablet (20 mg total) by mouth once daily.    b complex vitamins tablet Take 1 tablet by mouth every other day.     calcium citrate/vitamin D2 (ISIAH-CITRATE ORAL) Take 500 mg by mouth 2 (two) times daily.    cyanocobalamin (VITAMIN B-12) 1000 MCG tablet Take 100 mcg by mouth every morning.    FLUoxetine (PROZAC) 20 mg/5 mL (4 mg/mL) solution Take 10 mLs (40 mg total) by mouth once daily.    fluticasone (FLONASE) 50 mcg/actuation nasal spray 1 spray by Each Nare route 2 (two) times daily as needed for Rhinitis.    gabapentin (NEURONTIN) 300 MG capsule Take 1 pill QHS for 7 days then take 1 pill BID for 7 days then take pill TID    levoFLOXacin (LEVAQUIN) 250 mg/10 mL Soln Take 20 mLs (500 mg total) by mouth once daily.    levoFLOXacin (LEVAQUIN) 500 MG tablet Take 1 tablet (500 mg total) by mouth once daily.    MULTIVIT-IRON-MIN-FOLIC ACID 3,500-18-0.4 UNIT-MG-MG ORAL CHEW Take 1 tablet by mouth 2 (two) times daily.     omeprazole (PRILOSEC) 20 MG capsule TAKE 1 CAPSULE (20 MG TOTAL) BY MOUTH EVERY MORNING.    ondansetron (ZOFRAN) 4 MG tablet Take 1 tablet (4 mg total) by mouth every 6 (six) hours as needed.    oxyCODONE-acetaminophen (PERCOCET)  mg per tablet Take 1 tablet by mouth every 8 (eight) hours as needed for Pain. Greater than 7 day quantity medically necessary    vitamin D 185 MG Tab Take 5,000 mg by mouth every morning.      No current facility-administered medications for this visit.        PMHx, PSHx, Allergies, Medications reviewed in epic    ROS negative except pain complaints in HPI    OBJECTIVE:    BP (!) 143/87   Pulse 61   Temp 98.4 °F (36.9 °C)   Resp 18   Ht 5' 5" (1.651 m)   Wt 116 kg (255 lb 11.7 oz)   LMP 06/26/2018   BMI 42.56 kg/m²     PHYSICAL EXAMINATION:    GENERAL: Well appearing, in no acute distress, alert and oriented x3.  PSYCH:  Mood and affect appropriate.  SKIN: Skin color, texture, turgor normal, no rashes or lesions which will impact " the procedure.  CV: RRR with palpation of the radial artery.  MSK: pain to palpation over the SI joints bilaterally  PULM: No evidence of respiratory difficulty, symmetric chest rise. Clear to auscultation.  NEURO: Cranial nerves grossly intact.    Lab Results   Component Value Date    WBC 4.82 03/04/2020    HGB 12.2 03/04/2020    HCT 39.3 03/04/2020    MCV 97 03/04/2020     03/04/2020       BMP  Lab Results   Component Value Date     03/04/2020    K 4.3 03/04/2020     03/04/2020    CO2 30 (H) 03/04/2020    BUN 11 03/04/2020    CREATININE 0.8 03/04/2020    CALCIUM 9.4 03/04/2020    ANIONGAP 5 (L) 03/04/2020    ESTGFRAFRICA >60.0 03/04/2020    EGFRNONAA >60.0 03/04/2020       Plan:    Procedure Note: Left Geniculate nerve block under ultrasound     Surgeon: Lina Wagner M.D.     Assistant: None     Pre-Op Diagnosis: Knee pain     Post-Op Diagnosis: Same     EBL: None     Complications: None     Specimens: None     Description of procedure:   After written consent was obtained, patient placed in supine position and a time out was performed. The area over the medial and lateral aspect of the superior epi-condyle of the femur and the medial tibial metaphysis were prepped with chlorhexidine. A sterile ultrasound cover was applied, and the probe was placed over these 3 areas in order to identify the medial aspect of the bone at these 3 separate areas. A 22 gauge needle was then advanced out of plane under ultrasound until os was contacted, and then the needle was withdrawn approximately 1 mm and after negative aspiration and no paresthesias there was a mixture of 9 mL of 0.25% bupivacaine +1 mL of 40 mg per mL Depo-Medrol prepared. 3 mL of the mixture was injected into each of these 3 areas for a total volume of 9mL. Dispersion of the medication into the area of the nerve being visualized directly using the ultrasound probe, confirming that the medication was deposited in the correct area. Needle was  withdrawn and a sterile band-aid applied to the skin.     Patient tolerated the procedure well, and was reporting improvement of pain symptoms after the injection. She was discharged from the clinic in stable condition.     We will schedule the patient for bilateral SI joint injections under xray    Depending on the response in the future we can set up for repeat RFA of the lumbar spine.    Continue home exercises.    Will resume aqua therapy as soon as it is safe.    Lina Wagner 05/18/2020

## 2020-05-18 NOTE — PROGRESS NOTES
HPI  Patient presenting for Left knee genicular nerve block under ultrasound     Patient on Anti-coagulation No     In addition to the knee pain the patient has been having lower back pain previously had her radiofrequency ablation in the lumbar spine in December.  She has been unable to continue aqua therapy secondary to the coronavirus outbreak.    She is having lower back pain at this time.    No health changes since previous encounter    Past Medical History:   Diagnosis Date    Allergy     Anemia     Asthma     Bilateral shoulder bursitis     Cervical stenosis of spine     Chronic pain     DDD (degenerative disc disease), cervical     Depression 1/28/2019    Diabetes mellitus type II     DJD (degenerative joint disease) of knee 6/19/2014    Facet arthritis of lumbar region 12/17/2015    Facet syndrome 12/17/2015    GERD (gastroesophageal reflux disease)     Heartburn     Hyperlipidemia     Hypertension     Morbid obesity     Neuromuscular disorder     PADDY (obstructive sleep apnea)     Proteinuria     Right carpal tunnel syndrome     Sacroiliitis 6/13/2018    Sacroiliitis     Sleep apnea      Past Surgical History:   Procedure Laterality Date    CARPAL TUNNEL RELEASE      CARPAL TUNNEL RELEASE  1980s    left    CARPAL TUNNEL RELEASE  2012    right    ESOPHAGOGASTRODUODENOSCOPY N/A 3/27/2019    Procedure: EGD (ESOPHAGOGASTRODUODENOSCOPY);  Surgeon: Robbin Bar MD;  Location: [facility] ([address] [place] [address]);  Service: Endoscopy;  Laterality: N/A;  BMI 61.3/2nd floor case/svn    JOINT REPLACEMENT Bilateral     with 2 revisions on rt    KNEE SURGERY  3/2010    orthroscope    KNEE SURGERY  6-19-14    left TKR    LAPAROSCOPIC SLEEVE GASTRECTOMY N/A 8/28/2019    Procedure: GASTRECTOMY, SLEEVE, LAPAROSCOPIC with intraop EGD;  Surgeon: Heriberto Clements MD;  Location: [address];  Service: General;  Laterality: N/A;    RADIOFREQUENCY ABLATION Right 7/9/2019    Procedure:  RADIOFREQUENCY ABLATION;  Surgeon: Lina Wagner MD;  Location: [facility];  Service: Pain Management;  Laterality: Right;  RIGHT RFA L2,3,4,5  2 of 2    RADIOFREQUENCY ABLATION Left 12/19/2019    Procedure: RADIOFREQUENCY ABLATION, LEFT L2-L3-L4-L5 MEDIAL BRANCH 1 OF 2;  Surgeon: Lina Wagner MD;  Location: [facility];  Service: Pain Management;  Laterality: Left;    RADIOFREQUENCY ABLATION Right 12/31/2019    Procedure: RADIOFREQUENCY ABLATION, RIGHT L2-L3-L4-L5  2 OF 2;  Surgeon: Lina Wagner MD;  Location: [facility];  Service: Pain Management;  Laterality: Right;    RADIOFREQUENCY ABLATION OF LUMBAR MEDIAL BRANCH NERVE AT SINGLE LEVEL Left 6/26/2018    Procedure: RADIOFREQUENCY ABLATION, NERVE, MEDIAL BRANCH, LUMBAR, 1 LEVEL;  Surgeon: Lina Wagner MD;  Location: [facility];  Service: Pain Management;  Laterality: Left;  Left Cooled RFA @ L2,3,4,5  [id number]  with Sedation    1 of 2    RADIOFREQUENCY ABLATION OF LUMBAR MEDIAL BRANCH NERVE AT SINGLE LEVEL Right 7/10/2018    Procedure: RADIOFREQUENCY ABLATION, NERVE, MEDIAL BRANCH, LUMBAR, 1 LEVEL;  Surgeon: Lina Wagner MD;  Location: [facility];  Service: Pain Management;  Laterality: Right;  RIght Cooled RFA @ L2,3,4,5  [id number] with Sedation    2 of 2    TRIGGER FINGER RELEASE Right 2017    TRIGGER FINGER RELEASE Left 7/29/2019    Procedure: RELEASE, TRIGGER FINGER, Left Thumb;  Surgeon: Velma Garcia MD;  Location: [facility] OR;  Service: Orthopedics;  Laterality: Left;  Local w/ MAC     Review of patient's allergies indicates:   Allergen Reactions    Strawberry Anaphylaxis      Current Outpatient Medications   Medication Sig    albuterol (VENTOLIN HFA) 90 mcg/actuation inhaler INHALE TWO PUFFS INTO LUNGS EVERY 4 TO 6 HOURS AS NEEDED FOR SHORTNESS OF BREATH AND FOR WHEEZING    allopurinoL (ZYLOPRIM) 300 MG tablet Take 1 tablet (300 mg total) by mouth 2 (two) times daily.    atorvastatin (LIPITOR)  "20 MG tablet Take 1 tablet (20 mg total) by mouth once daily.    b complex vitamins tablet Take 1 tablet by mouth every other day.     calcium citrate/vitamin D2 (ISIAH-CITRATE ORAL) Take 500 mg by mouth 2 (two) times daily.    cyanocobalamin (VITAMIN B-12) 1000 MCG tablet Take 100 mcg by mouth every morning.    FLUoxetine (PROZAC) 20 mg/5 mL (4 mg/mL) solution Take 10 mLs (40 mg total) by mouth once daily.    fluticasone (FLONASE) 50 mcg/actuation nasal spray 1 spray by Each Nare route 2 (two) times daily as needed for Rhinitis.    gabapentin (NEURONTIN) 300 MG capsule Take 1 pill QHS for 7 days then take 1 pill BID for 7 days then take pill TID    levoFLOXacin (LEVAQUIN) 250 mg/10 mL Soln Take 20 mLs (500 mg total) by mouth once daily.    levoFLOXacin (LEVAQUIN) 500 MG tablet Take 1 tablet (500 mg total) by mouth once daily.    MULTIVIT-IRON-MIN-FOLIC ACID 3,500-18-0.4 UNIT-MG-MG ORAL CHEW Take 1 tablet by mouth 2 (two) times daily.     omeprazole (PRILOSEC) 20 MG capsule TAKE 1 CAPSULE (20 MG TOTAL) BY MOUTH EVERY MORNING.    ondansetron (ZOFRAN) 4 MG tablet Take 1 tablet (4 mg total) by mouth every 6 (six) hours as needed.    oxyCODONE-acetaminophen (PERCOCET)  mg per tablet Take 1 tablet by mouth every 8 (eight) hours as needed for Pain. Greater than 7 day quantity medically necessary    vitamin D 185 MG Tab Take 5,000 mg by mouth every morning.      No current facility-administered medications for this visit.        PMHx, PSHx, Allergies, Medications reviewed in epic    ROS negative except pain complaints in HPI    OBJECTIVE:    BP (!) 143/87   Pulse 61   Temp 98.4 °F (36.9 °C)   Resp 18   Ht 5' 5" (1.651 m)   Wt 116 kg (255 lb 11.7 oz)   LMP 06/26/2018   BMI 42.56 kg/m²     PHYSICAL EXAMINATION:    GENERAL: Well appearing, in no acute distress, alert and oriented x3.  PSYCH:  Mood and affect appropriate.  SKIN: Skin color, texture, turgor normal, no rashes or lesions which will impact " the procedure.  CV: RRR with palpation of the radial artery.  MSK: pain to palpation over the SI joints bilaterally  PULM: No evidence of respiratory difficulty, symmetric chest rise. Clear to auscultation.  NEURO: Cranial nerves grossly intact.    Lab Results   Component Value Date    WBC 4.82 03/04/2020    HGB 12.2 03/04/2020    HCT 39.3 03/04/2020    MCV 97 03/04/2020     03/04/2020       BMP  Lab Results   Component Value Date     03/04/2020    K 4.3 03/04/2020     03/04/2020    CO2 30 (H) 03/04/2020    BUN 11 03/04/2020    CREATININE 0.8 03/04/2020    CALCIUM 9.4 03/04/2020    ANIONGAP 5 (L) 03/04/2020    ESTGFRAFRICA >60.0 03/04/2020    EGFRNONAA >60.0 03/04/2020       Plan:    Procedure Note: Left Geniculate nerve block under ultrasound     Surgeon: Lina Wagner M.D.     Assistant: None     Pre-Op Diagnosis: Knee pain     Post-Op Diagnosis: Same     EBL: None     Complications: None     Specimens: None     Description of procedure:   After written consent was obtained, patient placed in supine position and a time out was performed. The area over the medial and lateral aspect of the superior epi-condyle of the femur and the medial tibial metaphysis were prepped with chlorhexidine. A sterile ultrasound cover was applied, and the probe was placed over these 3 areas in order to identify the medial aspect of the bone at these 3 separate areas. A 22 gauge needle was then advanced out of plane under ultrasound until os was contacted, and then the needle was withdrawn approximately 1 mm and after negative aspiration and no paresthesias there was a mixture of 9 mL of 0.25% bupivacaine +1 mL of 40 mg per mL Depo-Medrol prepared. 3 mL of the mixture was injected into each of these 3 areas for a total volume of 9mL. Dispersion of the medication into the area of the nerve being visualized directly using the ultrasound probe, confirming that the medication was deposited in the correct area. Needle was  withdrawn and a sterile band-aid applied to the skin.     Patient tolerated the procedure well, and was reporting improvement of pain symptoms after the injection. She was discharged from the clinic in stable condition.     We will schedule the patient for bilateral SI joint injections under xray    Depending on the response in the future we can set up for repeat RFA of the lumbar spine.    Continue home exercises.    Will resume aqua therapy as soon as it is safe.    Lina Wagner 05/18/2020

## 2020-05-19 ENCOUNTER — TELEPHONE (OUTPATIENT)
Dept: PAIN MEDICINE | Facility: CLINIC | Age: 55
End: 2020-05-19

## 2020-05-27 DIAGNOSIS — M25.561 PAIN IN BOTH KNEES, UNSPECIFIED CHRONICITY: Primary | ICD-10-CM

## 2020-05-27 DIAGNOSIS — M25.562 PAIN IN BOTH KNEES, UNSPECIFIED CHRONICITY: Primary | ICD-10-CM

## 2020-06-03 ENCOUNTER — TELEPHONE (OUTPATIENT)
Dept: ENDOSCOPY | Facility: HOSPITAL | Age: 55
End: 2020-06-03

## 2020-06-03 DIAGNOSIS — Z12.11 SPECIAL SCREENING FOR MALIGNANT NEOPLASMS, COLON: Primary | ICD-10-CM

## 2020-06-03 RX ORDER — POLYETHYLENE GLYCOL 3350, SODIUM SULFATE ANHYDROUS, SODIUM BICARBONATE, SODIUM CHLORIDE, POTASSIUM CHLORIDE 236; 22.74; 6.74; 5.86; 2.97 G/4L; G/4L; G/4L; G/4L; G/4L
4 POWDER, FOR SOLUTION ORAL ONCE
Qty: 4000 ML | Refills: 0 | Status: SHIPPED | OUTPATIENT
Start: 2020-06-03 | End: 2020-06-03

## 2020-06-04 ENCOUNTER — PATIENT OUTREACH (OUTPATIENT)
Dept: ADMINISTRATIVE | Facility: OTHER | Age: 55
End: 2020-06-04

## 2020-06-04 NOTE — PROGRESS NOTES
Chart reviewed.   Immunizations: updated  Orders placed: n/a  Upcoming appts to satisfy DANIEL topics: Mammogram 6/08, Colonoscopy 6/15, Optometry 7/15

## 2020-06-08 ENCOUNTER — OFFICE VISIT (OUTPATIENT)
Dept: ORTHOPEDICS | Facility: CLINIC | Age: 55
End: 2020-06-08
Payer: MEDICARE

## 2020-06-08 ENCOUNTER — HOSPITAL ENCOUNTER (OUTPATIENT)
Dept: RADIOLOGY | Facility: HOSPITAL | Age: 55
Discharge: HOME | End: 2020-06-08
Attending: ORTHOPAEDIC SURGERY
Payer: MEDICARE

## 2020-06-08 ENCOUNTER — HOSPITAL ENCOUNTER (OUTPATIENT)
Dept: RADIOLOGY | Facility: HOSPITAL | Age: 55
Discharge: HOME | End: 2020-06-08
Attending: OBSTETRICS & GYNECOLOGY
Payer: MEDICARE

## 2020-06-08 VITALS — BODY MASS INDEX: 42.61 KG/M2 | WEIGHT: 255.75 LBS | HEIGHT: 65 IN

## 2020-06-08 DIAGNOSIS — M25.561 CHRONIC PAIN OF BOTH KNEES: Primary | ICD-10-CM

## 2020-06-08 DIAGNOSIS — M25.561 PAIN IN BOTH KNEES, UNSPECIFIED CHRONICITY: ICD-10-CM

## 2020-06-08 DIAGNOSIS — E66.01 MORBID OBESITY WITH BMI OF 40.0-44.9, ADULT: ICD-10-CM

## 2020-06-08 DIAGNOSIS — M25.562 CHRONIC PAIN OF BOTH KNEES: Primary | ICD-10-CM

## 2020-06-08 DIAGNOSIS — G89.29 CHRONIC PAIN OF BOTH KNEES: Primary | ICD-10-CM

## 2020-06-08 DIAGNOSIS — M25.562 PAIN IN BOTH KNEES, UNSPECIFIED CHRONICITY: ICD-10-CM

## 2020-06-08 DIAGNOSIS — Z12.31 VISIT FOR SCREENING MAMMOGRAM: ICD-10-CM

## 2020-06-08 PROCEDURE — 99213 OFFICE O/P EST LOW 20 MIN: CPT | Mod: PBBFAC,25 | Performed by: ORTHOPAEDIC SURGERY

## 2020-06-08 PROCEDURE — 99999 PR PBB SHADOW E&M-EST. PATIENT-LVL III: CPT | Mod: PBBFAC,,, | Performed by: ORTHOPAEDIC SURGERY

## 2020-06-08 PROCEDURE — 77063 BREAST TOMOSYNTHESIS BI: CPT | Mod: 26,,, | Performed by: RADIOLOGY

## 2020-06-08 PROCEDURE — 77067 SCR MAMMO BI INCL CAD: CPT | Mod: TC

## 2020-06-08 PROCEDURE — 73562 X-RAY EXAM OF KNEE 3: CPT | Mod: 26,50,, | Performed by: RADIOLOGY

## 2020-06-08 PROCEDURE — 77063 MAMMO DIGITAL SCREENING BILAT WITH TOMOSYNTHESIS_CAD: ICD-10-PCS | Mod: 26,,, | Performed by: RADIOLOGY

## 2020-06-08 PROCEDURE — 99213 OFFICE O/P EST LOW 20 MIN: CPT | Mod: S$PBB,,, | Performed by: ORTHOPAEDIC SURGERY

## 2020-06-08 PROCEDURE — 73562 XR KNEE ORTHO BILAT: ICD-10-PCS | Mod: 26,50,, | Performed by: RADIOLOGY

## 2020-06-08 PROCEDURE — 99213 PR OFFICE/OUTPT VISIT, EST, LEVL III, 20-29 MIN: ICD-10-PCS | Mod: S$PBB,,, | Performed by: ORTHOPAEDIC SURGERY

## 2020-06-08 PROCEDURE — 77067 SCR MAMMO BI INCL CAD: CPT | Mod: 26,,, | Performed by: RADIOLOGY

## 2020-06-08 PROCEDURE — 99999 PR PBB SHADOW E&M-EST. PATIENT-LVL III: ICD-10-PCS | Mod: PBBFAC,,, | Performed by: ORTHOPAEDIC SURGERY

## 2020-06-08 PROCEDURE — 73562 X-RAY EXAM OF KNEE 3: CPT | Mod: TC,50

## 2020-06-08 PROCEDURE — 77067 MAMMO DIGITAL SCREENING BILAT WITH TOMOSYNTHESIS_CAD: ICD-10-PCS | Mod: 26,,, | Performed by: RADIOLOGY

## 2020-06-08 NOTE — PROGRESS NOTES
"Subjective:      Patient ID: Ailvia Thomas is a 54 y.o. female.    Chief Complaint: Pain of the Left Knee and Pain of the Right Knee    HPI  Alivia Thomas has bilateral knee pain. Right knee is worse than left  The pain is improved with the RFA. The pain is located in the anterior aspect of the knee.  There  is not radiation.  There is associated stiffness on the righ.   There is not catching and locking. The pain is described as achy. The pain is aggravated by activirty.  It is alleviated by rest.  There is  associated back pain.  Her history, medications and problem list were reviewed. She has lost over 100 pounds since bariatric surgery.    Review of Systems   Constitution: Negative for chills, fever and night sweats.   HENT: Negative for hearing loss.    Eyes: Negative for blurred vision and double vision.   Cardiovascular: Negative for chest pain, claudication and leg swelling.   Respiratory: Negative for shortness of breath.    Endocrine: Negative for polydipsia, polyphagia and polyuria.   Hematologic/Lymphatic: Negative for adenopathy and bleeding problem. Does not bruise/bleed easily.   Skin: Negative for poor wound healing.   Musculoskeletal: Positive for joint pain.   Gastrointestinal: Negative for diarrhea and heartburn.   Genitourinary: Negative for bladder incontinence.   Neurological: Negative for focal weakness, headaches, numbness, paresthesias and sensory change.   Psychiatric/Behavioral: The patient is not nervous/anxious.    Allergic/Immunologic: Negative for persistent infections.         Objective:      Body mass index is 42.56 kg/m².  Vitals:    06/08/20 0912   Weight: 116 kg (255 lb 11.7 oz)   Height: 5' 5" (1.651 m)           General    Constitutional: She is oriented to person, place, and time. She appears well-developed and well-nourished.   HENT:   Head: Normocephalic and atraumatic.   Eyes: EOM are normal.   Cardiovascular: Normal rate.    Pulmonary/Chest: Effort normal. "   Neurological: She is alert and oriented to person, place, and time.   Psychiatric: She has a normal mood and affect. Her behavior is normal.     General Musculoskeletal Exam   Gait: abnormal       Right Knee Exam     Inspection   Erythema: absent  Scars: present  Swelling: absent  Effusion: absent  Deformity: absent  Bruising: absent    Tenderness   The patient is tender to palpation of the patella.    Range of Motion   Extension: 15 (Extensor lag)   Flexion: 120     Tests   Ligament Examination Lachman: normal (-1 to 2mm)   MCL - Valgus: normal (0 to 2mm)  LCL - Varus: normal  Patella   Passive Patellar Tilt: neutral    Other   Sensation: normal    Left Knee Exam     Inspection   Erythema: absent  Scars: present  Swelling: absent  Effusion: absent  Deformity: absent  Bruising: absent    Range of Motion   Extension: 0   Flexion: 120     Tests   Stability Lachman: normal (-1 to 2mm)   MCL - Valgus: normal (0 to 2mm)  LCL - Varus: normal (0 to 2mm)  Patella   Passive Patellar Tilt: neutral    Other   Sensation: normal    Muscle Strength   Right Lower Extremity   Hip Abduction: 5/5   Quadriceps:  5/5 and 4/5   Hamstrin/5   Left Lower Extremity   Hip Abduction: 5/5   Quadriceps:  5/5   Hamstrin/5     Reflexes     Left Side  Quadriceps:  2+    Right Side   Quadriceps:  2+    Vascular Exam     Right Pulses  Dorsalis Pedis:      2+          Left Pulses  Dorsalis Pedis:      2+          Edema  Right Lower Leg: absent  Left Lower Leg: absent      Radiographs taken today were reviewed by me.  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  Tibial stem on the right.  The prostheses are well positioned.  There is not evidence of bone loss, osteolysis, or loosening. Fragmentation of the patellae          Assessment:       Encounter Diagnoses   Name Primary?    Chronic pain of both knees Yes    Morbid obesity with BMI of 40.0-44.9, adult           Plan:       Alivia was seen today for pain and  pain.    Diagnoses and all orders for this visit:    Chronic pain of both knees  -     Ambulatory referral/consult to Physical/Occupational Therapy; Future    Morbid obesity with BMI of 40.0-44.9, adult      Options were discussed.  We will pursue a course of physical therapy.  F/U in 6 weeks.

## 2020-06-09 ENCOUNTER — HOSPITAL ENCOUNTER (OUTPATIENT)
Facility: OTHER | Age: 55
Discharge: HOME | End: 2020-06-09
Attending: ANESTHESIOLOGY | Admitting: ANESTHESIOLOGY
Payer: MEDICARE

## 2020-06-09 VITALS
HEIGHT: 65 IN | SYSTOLIC BLOOD PRESSURE: 128 MMHG | TEMPERATURE: 99 F | OXYGEN SATURATION: 99 % | HEART RATE: 56 BPM | WEIGHT: 245 LBS | RESPIRATION RATE: 16 BRPM | BODY MASS INDEX: 40.82 KG/M2 | DIASTOLIC BLOOD PRESSURE: 74 MMHG

## 2020-06-09 DIAGNOSIS — G89.29 CHRONIC PAIN: ICD-10-CM

## 2020-06-09 DIAGNOSIS — M53.3 SACROILIAC JOINT PAIN: Primary | ICD-10-CM

## 2020-06-09 PROCEDURE — 27096 INJECT SACROILIAC JOINT: CPT | Mod: 50,,, | Performed by: ANESTHESIOLOGY

## 2020-06-09 PROCEDURE — 27096 INJECT SACROILIAC JOINT: CPT | Mod: 50

## 2020-06-09 PROCEDURE — 27096 PR INJECTION,SACROILIAC JOINT: ICD-10-PCS | Mod: 50,,, | Performed by: ANESTHESIOLOGY

## 2020-06-09 PROCEDURE — 25500020 PHARM REV CODE 255: Performed by: ANESTHESIOLOGY

## 2020-06-09 PROCEDURE — 63600175 PHARM REV CODE 636 W HCPCS: Performed by: ANESTHESIOLOGY

## 2020-06-09 PROCEDURE — 25000003 PHARM REV CODE 250: Performed by: ANESTHESIOLOGY

## 2020-06-09 RX ORDER — BUPIVACAINE HYDROCHLORIDE 2.5 MG/ML
INJECTION, SOLUTION EPIDURAL; INFILTRATION; INTRACAUDAL
Status: DISCONTINUED | OUTPATIENT
Start: 2020-06-09 | End: 2020-06-09 | Stop reason: HOSPADM

## 2020-06-09 RX ORDER — METHYLPREDNISOLONE ACETATE 40 MG/ML
INJECTION, SUSPENSION INTRA-ARTICULAR; INTRALESIONAL; INTRAMUSCULAR; SOFT TISSUE
Status: DISCONTINUED | OUTPATIENT
Start: 2020-06-09 | End: 2020-06-09 | Stop reason: HOSPADM

## 2020-06-09 RX ORDER — ALPRAZOLAM 0.5 MG/1
0.5 TABLET ORAL
Status: DISCONTINUED | OUTPATIENT
Start: 2020-06-09 | End: 2020-06-09 | Stop reason: HOSPADM

## 2020-06-09 NOTE — DISCHARGE INSTRUCTIONS

## 2020-06-09 NOTE — OP NOTE
Sacroiliac Joint Injection under Fluoroscopy    Date of procedure 06/09/2020    Time-out taken to identify patient and procedure side prior to starting the procedure.                                                                 PROCEDURE:  Bilateral sacroiliac joint injection under fluoroscopy.    1) Right  sacroiliac joint injection under fluoroscopy.    2) Left  sacroiliac joint injection under fluoroscopy.    REASON FOR PROCEDURE: Bilateral sacroiliitis [M46.1]  Low back pain, unspecified back pain laterality, unspecified chronicity, unspecified whether sciatica present [M54.5]    PHYSICIAN: Lina Wagner MD    Assistants:   JR Catalino Jordan MD, PGY-5, Pain Fellow  I was present and supervising all critical portions of the procedure        MEDICATIONS INJECTED:  Depo-Medrol 40mg and 4 mL Bupivacaine 0.25% into each joint    LOCAL ANESTHETIC USED: Xylocaine 1% 5mL    SEDATION MEDICATIONS: None    ESTIMATED BLOOD LOSS:  None.    COMPLICATIONS:  None.    TECHNIQUE:   Laying in the prone position, the patient was prepped and draped in the usual sterile fashion using ChloraPrep and fenestrated drape.  The area was determined under fluoroscopy.   The 3.5 inch 22-gauge spinal needle was introduce into bilateral sacroiliac joints.  Negative pressure applied to confirm no intravascular placement.  Omnipaque was injected to confirm placement and to confirm that there was no vascular runoff.  The medication was then injected slowly.  The patient tolerated the procedure well.    The patient was monitored for approximately 30 minutes after the procedure.  Patient was given post procedure and discharge instructions to follow at home.  We will see the patient back in two weeks or the patient may call to inform of status. The patient was discharged in a stable condition

## 2020-06-09 NOTE — DISCHARGE SUMMARY
Discharge Note  Short Stay      SUMMARY     Admit Date: 6/9/2020    Attending Physician: Lina Wagner      Discharge Physician: Lina Wagner      Discharge Date: 6/9/2020 10:52 AM    Procedure(s) (LRB):  INJECTION, JOINT, BILATERAL SI (Bilateral)    Final Diagnosis: Bilateral sacroiliitis [M46.1]  Low back pain, unspecified back pain laterality, unspecified chronicity, unspecified whether sciatica present [M54.5]    Disposition: Home or self care    Patient Instructions:   Current Discharge Medication List      CONTINUE these medications which have NOT CHANGED    Details   albuterol (VENTOLIN HFA) 90 mcg/actuation inhaler INHALE TWO PUFFS INTO LUNGS EVERY 4 TO 6 HOURS AS NEEDED FOR SHORTNESS OF BREATH AND FOR WHEEZING  Qty: 18 each, Refills: 0    Comments: Please consider 90 day supplies to promote better adherence  Associated Diagnoses: Asthma, well controlled, mild intermittent      allopurinoL (ZYLOPRIM) 300 MG tablet Take 1 tablet (300 mg total) by mouth 2 (two) times daily.  Qty: 180 tablet, Refills: 3      atorvastatin (LIPITOR) 20 MG tablet Take 1 tablet (20 mg total) by mouth once daily.  Qty: 90 tablet, Refills: 3      b complex vitamins tablet Take 1 tablet by mouth every other day.       calcium citrate/vitamin D2 (ISIAH-CITRATE ORAL) Take 500 mg by mouth 2 (two) times daily.      cyanocobalamin (VITAMIN B-12) 1000 MCG tablet Take 100 mcg by mouth every morning.      FLUoxetine (PROZAC) 20 mg/5 mL (4 mg/mL) solution Take 10 mLs (40 mg total) by mouth once daily.  Qty: 300 mL, Refills: 6      fluticasone (FLONASE) 50 mcg/actuation nasal spray 1 spray by Each Nare route 2 (two) times daily as needed for Rhinitis.  Qty: 15 g, Refills: 0      gabapentin (NEURONTIN) 300 MG capsule Take 1 pill QHS for 7 days then take 1 pill BID for 7 days then take pill TID  Qty: 90 capsule, Refills: 1    Associated Diagnoses: Subacromial bursitis of left shoulder joint; Myofascial pain; Chronic left shoulder pain;  Cervical radiculopathy; Cervical spondylosis; DDD (degenerative disc disease), cervical; Chronic pain disorder      levoFLOXacin (LEVAQUIN) 250 mg/10 mL Soln Take 20 mLs (500 mg total) by mouth once daily.  Qty: 200 mL, Refills: 0      levoFLOXacin (LEVAQUIN) 500 MG tablet Take 1 tablet (500 mg total) by mouth once daily.  Qty: 10 tablet, Refills: 0      MULTIVIT-IRON-MIN-FOLIC ACID 3,500-18-0.4 UNIT-MG-MG ORAL CHEW Take 1 tablet by mouth 2 (two) times daily.       omeprazole (PRILOSEC) 20 MG capsule TAKE 1 CAPSULE (20 MG TOTAL) BY MOUTH EVERY MORNING.  Qty: 90 capsule, Refills: 3    Associated Diagnoses: Gastroesophageal reflux disease without esophagitis      ondansetron (ZOFRAN) 4 MG tablet Take 1 tablet (4 mg total) by mouth every 6 (six) hours as needed.  Qty: 20 tablet, Refills: 0      oxyCODONE-acetaminophen (PERCOCET)  mg per tablet Take 1 tablet by mouth every 8 (eight) hours as needed for Pain. Greater than 7 day quantity medically necessary  Qty: 90 tablet, Refills: 0    Comments: Quantity prescribed more than 7 day supply? Yes, quantity medically necessary  Associated Diagnoses: Spondylosis of lumbar region without myelopathy or radiculopathy; Facet arthritis of lumbar region; DDD (degenerative disc disease), lumbar; Primary osteoarthritis of both knees; Bilateral chronic knee pain; Status post total right knee replacement; Chronic pain disorder; Encounter for long-term opiate analgesic use      vitamin D 185 MG Tab Take 5,000 mg by mouth every morning.                  Discharge Diagnosis: Bilateral sacroiliitis [M46.1]  Low back pain, unspecified back pain laterality, unspecified chronicity, unspecified whether sciatica present [M54.5]  Condition on Discharge: Stable with no complications to procedure   Diet on Discharge: Same as before.  Activity: as per instruction sheet.  Discharge to: Home with a responsible adult.  Follow up: 2-4 weeks       Please call my office or pager at [phone] if  experienced any weakness or loss of sensation, fever > 101.5, pain uncontrolled with oral medications, persistent nausea/vomiting/or diarrhea, redness or drainage from the incisions, or any other worrisome concerns. If physician on call was not reached or could not communicate with our office for any reason please go to the nearest emergency department

## 2020-06-12 ENCOUNTER — LAB VISIT (OUTPATIENT)
Dept: URGENT CARE | Facility: CLINIC | Age: 55
End: 2020-06-12
Payer: MEDICARE

## 2020-06-12 DIAGNOSIS — M25.562 BILATERAL CHRONIC KNEE PAIN: ICD-10-CM

## 2020-06-12 DIAGNOSIS — M17.0 PRIMARY OSTEOARTHRITIS OF BOTH KNEES: ICD-10-CM

## 2020-06-12 DIAGNOSIS — G89.29 BILATERAL CHRONIC KNEE PAIN: ICD-10-CM

## 2020-06-12 DIAGNOSIS — Z79.891 ENCOUNTER FOR LONG-TERM OPIATE ANALGESIC USE: ICD-10-CM

## 2020-06-12 DIAGNOSIS — M25.561 BILATERAL CHRONIC KNEE PAIN: ICD-10-CM

## 2020-06-12 DIAGNOSIS — Z96.651 STATUS POST TOTAL RIGHT KNEE REPLACEMENT: ICD-10-CM

## 2020-06-12 DIAGNOSIS — M47.816 FACET ARTHRITIS OF LUMBAR REGION: ICD-10-CM

## 2020-06-12 DIAGNOSIS — M51.36 DDD (DEGENERATIVE DISC DISEASE), LUMBAR: ICD-10-CM

## 2020-06-12 DIAGNOSIS — Z12.11 SPECIAL SCREENING FOR MALIGNANT NEOPLASMS, COLON: ICD-10-CM

## 2020-06-12 DIAGNOSIS — G89.4 CHRONIC PAIN DISORDER: ICD-10-CM

## 2020-06-12 DIAGNOSIS — M47.816 SPONDYLOSIS OF LUMBAR REGION WITHOUT MYELOPATHY OR RADICULOPATHY: ICD-10-CM

## 2020-06-12 LAB — SARS-COV-2 RNA RESP QL NAA+PROBE: NOT DETECTED

## 2020-06-12 PROCEDURE — U0003 INFECTIOUS AGENT DETECTION BY NUCLEIC ACID (DNA OR RNA); SEVERE ACUTE RESPIRATORY SYNDROME CORONAVIRUS 2 (SARS-COV-2) (CORONAVIRUS DISEASE [COVID-19]), AMPLIFIED PROBE TECHNIQUE, MAKING USE OF HIGH THROUGHPUT TECHNOLOGIES AS DESCRIBED BY CMS-2020-01-R: HCPCS

## 2020-06-12 RX ORDER — OXYCODONE AND ACETAMINOPHEN 10; 325 MG/1; MG/1
1 TABLET ORAL EVERY 8 HOURS PRN
Qty: 90 TABLET | Refills: 0 | Status: SHIPPED | OUTPATIENT
Start: 2020-06-13 | End: 2020-07-13 | Stop reason: SDUPTHER

## 2020-06-12 NOTE — TELEPHONE ENCOUNTER
Patient requesting refill on Percocet 10/325mg  Last office visit 04.15.20   shows last refill on 05.14.20  Patient does have a pain contract on file with [facility]  Patient last UDS 10.16.20 was consistent with current therapy

## 2020-06-12 NOTE — TELEPHONE ENCOUNTER
----- Message from Yoko Xavier sent at 6/12/2020  8:33 AM CDT -----  Contact: DONTAE MOON [[id number]]  Name of Who is Calling: DONTAE MOON [[id number]]      What is the request in detail: Pt is calling to speak to staff in regards to having a script refilled .... Please call to further assist .       Can the clinic reply by [facility]: N       What [facility]: [phone]

## 2020-06-13 ENCOUNTER — TELEPHONE (OUTPATIENT)
Dept: URGENT CARE | Facility: CLINIC | Age: 55
End: 2020-06-13

## 2020-06-14 ENCOUNTER — ANESTHESIA EVENT (OUTPATIENT)
Dept: ENDOSCOPY | Facility: HOSPITAL | Age: 55
End: 2020-06-14
Payer: MEDICARE

## 2020-06-14 ENCOUNTER — NURSE TRIAGE (OUTPATIENT)
Dept: ADMINISTRATIVE | Facility: CLINIC | Age: 55
End: 2020-06-14

## 2020-06-14 ENCOUNTER — TELEPHONE (OUTPATIENT)
Dept: URGENT CARE | Facility: CLINIC | Age: 55
End: 2020-06-14

## 2020-06-14 NOTE — TELEPHONE ENCOUNTER
Reason for Disposition   General information question, no triage required and triager able to answer question    Protocols used: INFORMATION ONLY CALL-A-    Pt stated she is returning a call to [facility] regarding her COVID results. Warm transferred to [place] at Urgent Care.

## 2020-06-15 ENCOUNTER — ANESTHESIA (OUTPATIENT)
Dept: ENDOSCOPY | Facility: HOSPITAL | Age: 55
End: 2020-06-15
Payer: MEDICARE

## 2020-06-15 ENCOUNTER — HOSPITAL ENCOUNTER (OUTPATIENT)
Facility: HOSPITAL | Age: 55
Discharge: HOME | End: 2020-06-15
Attending: INTERNAL MEDICINE | Admitting: INTERNAL MEDICINE
Payer: MEDICARE

## 2020-06-15 VITALS
HEART RATE: 55 BPM | BODY MASS INDEX: 41.48 KG/M2 | SYSTOLIC BLOOD PRESSURE: 117 MMHG | RESPIRATION RATE: 14 BRPM | HEIGHT: 65 IN | WEIGHT: 249 LBS | OXYGEN SATURATION: 100 % | TEMPERATURE: 97 F | DIASTOLIC BLOOD PRESSURE: 72 MMHG

## 2020-06-15 DIAGNOSIS — Z12.11 COLON CANCER SCREENING: Primary | ICD-10-CM

## 2020-06-15 PROCEDURE — 45380 PR COLONOSCOPY,BIOPSY: ICD-10-PCS | Mod: PT,,, | Performed by: INTERNAL MEDICINE

## 2020-06-15 PROCEDURE — 37000009 HC ANESTHESIA EA ADD 15 MINS: Performed by: INTERNAL MEDICINE

## 2020-06-15 PROCEDURE — 45380 COLONOSCOPY AND BIOPSY: CPT | Mod: PT,,, | Performed by: INTERNAL MEDICINE

## 2020-06-15 PROCEDURE — 25000003 PHARM REV CODE 250: Performed by: NURSE ANESTHETIST, CERTIFIED REGISTERED

## 2020-06-15 PROCEDURE — 88305 TISSUE EXAM BY PATHOLOGIST: CPT | Performed by: PATHOLOGY

## 2020-06-15 PROCEDURE — E9220 PRA ENDO ANESTHESIA: HCPCS | Mod: PT,,, | Performed by: NURSE ANESTHETIST, CERTIFIED REGISTERED

## 2020-06-15 PROCEDURE — 25000003 PHARM REV CODE 250: Performed by: INTERNAL MEDICINE

## 2020-06-15 PROCEDURE — 27201012 HC FORCEPS, HOT/COLD, DISP: Performed by: INTERNAL MEDICINE

## 2020-06-15 PROCEDURE — 37000008 HC ANESTHESIA 1ST 15 MINUTES: Performed by: INTERNAL MEDICINE

## 2020-06-15 PROCEDURE — 45380 COLONOSCOPY AND BIOPSY: CPT | Performed by: INTERNAL MEDICINE

## 2020-06-15 PROCEDURE — 88305 TISSUE EXAM BY PATHOLOGIST: CPT | Mod: 26,,, | Performed by: PATHOLOGY

## 2020-06-15 PROCEDURE — E9220 PRA ENDO ANESTHESIA: ICD-10-PCS | Mod: PT,,, | Performed by: NURSE ANESTHETIST, CERTIFIED REGISTERED

## 2020-06-15 PROCEDURE — 63600175 PHARM REV CODE 636 W HCPCS: Performed by: NURSE ANESTHETIST, CERTIFIED REGISTERED

## 2020-06-15 PROCEDURE — 88305 TISSUE EXAM BY PATHOLOGIST: ICD-10-PCS | Mod: 26,,, | Performed by: PATHOLOGY

## 2020-06-15 RX ORDER — PROPOFOL 10 MG/ML
VIAL (ML) INTRAVENOUS
Status: DISCONTINUED | OUTPATIENT
Start: 2020-06-15 | End: 2020-06-15

## 2020-06-15 RX ORDER — GLYCOPYRROLATE 0.2 MG/ML
INJECTION INTRAMUSCULAR; INTRAVENOUS
Status: DISCONTINUED | OUTPATIENT
Start: 2020-06-15 | End: 2020-06-15

## 2020-06-15 RX ORDER — PROPOFOL 10 MG/ML
VIAL (ML) INTRAVENOUS CONTINUOUS PRN
Status: DISCONTINUED | OUTPATIENT
Start: 2020-06-15 | End: 2020-06-15

## 2020-06-15 RX ORDER — SODIUM CHLORIDE 9 MG/ML
INJECTION, SOLUTION INTRAVENOUS CONTINUOUS
Status: DISCONTINUED | OUTPATIENT
Start: 2020-06-15 | End: 2020-06-15 | Stop reason: HOSPADM

## 2020-06-15 RX ORDER — LIDOCAINE HCL/PF 100 MG/5ML
SYRINGE (ML) INTRAVENOUS
Status: DISCONTINUED | OUTPATIENT
Start: 2020-06-15 | End: 2020-06-15

## 2020-06-15 RX ORDER — SODIUM CHLORIDE 9 MG/ML
INJECTION, SOLUTION INTRAVENOUS CONTINUOUS
Status: CANCELLED | OUTPATIENT
Start: 2020-06-15

## 2020-06-15 RX ADMIN — PROPOFOL 150 MCG/KG/MIN: 10 INJECTION, EMULSION INTRAVENOUS at 08:06

## 2020-06-15 RX ADMIN — GLYCOPYRROLATE 0.1 MG: 0.2 INJECTION, SOLUTION INTRAMUSCULAR; INTRAVENOUS at 08:06

## 2020-06-15 RX ADMIN — PROPOFOL 60 MG: 10 INJECTION, EMULSION INTRAVENOUS at 08:06

## 2020-06-15 RX ADMIN — SODIUM CHLORIDE: 0.9 INJECTION, SOLUTION INTRAVENOUS at 08:06

## 2020-06-15 RX ADMIN — Medication 80 MG: at 08:06

## 2020-06-15 NOTE — ANESTHESIA PREPROCEDURE EVALUATION
[facility]  Anesthesia Pre-Operative Evaluation         Patient Name: Alivia Thomas  YOB: 1965  MRN: [id number]    SUBJECTIVE:     06/15/2020    Alivia Thomas is a 54 y.o. female w/ a significant PMHx of morbid obesity, PADDY, DM, asthma, GERD, and HLD.    Patient now presents for the above procedure(s).      LDA: None documented.       Prev airway: None documented.    Drips: None documented.      Patient Active Problem List   Diagnosis    Morbid obesity    PADDY on CPAP    Type 2 diabetes mellitus, uncontrolled    Nuclear sclerosis - Both Eyes    Hyperlipemia    Proteinuria    Bilateral knee pain    DJD (degenerative joint disease) of knee    Debility    Prosthetic joint implant failure    Failed total knee arthroplasty    Right knee pain    Knee pain    S/P total knee replacement    Lumbago    CTS (carpal tunnel syndrome)    Right carpal tunnel syndrome    Chronic, continuous use of opioids    Mild intermittent asthma without complication    Left arm pain    Left shoulder pain    Allergic reaction to food    DDD (degenerative disc disease), cervical    Cervical radiculopathy    Cervical spondylosis    Cubital tunnel syndrome on right    Bilateral shoulder bursitis    Cervical stenosis of spinal canal    DDD (degenerative disc disease), thoracic    Thoracic spondylosis    Spondylolisthesis of lumbar region    Spondylosis of lumbar region without myelopathy or radiculopathy    Spondylolisthesis of cervical region    Cervical spine instability    Myeloradiculopathy    Neural foraminal stenosis of cervical spine    DDD (degenerative disc disease), lumbar    Idiopathic scoliosis of thoracolumbar spine    Bilateral shoulder region arthritis    Impingement syndrome of right shoulder    Trochanteric bursitis of both hips    Chronic pain    Depression    Recurrent major depressive disorder, in partial remission    GERD (gastroesophageal reflux  disease)    Trigger finger    Dyspnea    BMI 45.0-49.9, adult    Asthma    Sacroiliac joint pain       Review of patient's allergies indicates:   Allergen Reactions    Strawberry Anaphylaxis       Current Inpatient Medications:      Current Outpatient Medications on File Prior to Visit   Medication Sig Dispense Refill    albuterol (VENTOLIN HFA) 90 mcg/actuation inhaler INHALE TWO PUFFS INTO LUNGS EVERY 4 TO 6 HOURS AS NEEDED FOR SHORTNESS OF BREATH AND FOR WHEEZING 18 each 0    allopurinoL (ZYLOPRIM) 300 MG tablet Take 1 tablet (300 mg total) by mouth 2 (two) times daily. 180 tablet 3    atorvastatin (LIPITOR) 20 MG tablet Take 1 tablet (20 mg total) by mouth once daily. 90 tablet 3    b complex vitamins tablet Take 1 tablet by mouth every other day.       calcium citrate/vitamin D2 (ISIAH-CITRATE ORAL) Take 500 mg by mouth 2 (two) times daily.      cyanocobalamin (VITAMIN B-12) 1000 MCG tablet Take 100 mcg by mouth every morning.      FLUoxetine (PROZAC) 20 mg/5 mL (4 mg/mL) solution Take 10 mLs (40 mg total) by mouth once daily. 300 mL 6    fluticasone (FLONASE) 50 mcg/actuation nasal spray 1 spray by Each Nare route 2 (two) times daily as needed for Rhinitis. 15 g 0    gabapentin (NEURONTIN) 300 MG capsule Take 1 pill QHS for 7 days then take 1 pill BID for 7 days then take pill TID 90 capsule 1    levoFLOXacin (LEVAQUIN) 250 mg/10 mL Soln Take 20 mLs (500 mg total) by mouth once daily. 200 mL 0    levoFLOXacin (LEVAQUIN) 500 MG tablet Take 1 tablet (500 mg total) by mouth once daily. 10 tablet 0    MULTIVIT-IRON-MIN-FOLIC ACID 3,500-18-0.4 UNIT-MG-MG ORAL CHEW Take 1 tablet by mouth 2 (two) times daily.       omeprazole (PRILOSEC) 20 MG capsule TAKE 1 CAPSULE (20 MG TOTAL) BY MOUTH EVERY MORNING. 90 capsule 3    ondansetron (ZOFRAN) 4 MG tablet Take 1 tablet (4 mg total) by mouth every 6 (six) hours as needed. 20 tablet 0    oxyCODONE-acetaminophen (PERCOCET)  mg per tablet Take 1  tablet by mouth every 8 (eight) hours as needed for Pain. Greater than 7 day quantity medically necessary 90 tablet 0    vitamin D 185 MG Tab Take 5,000 mg by mouth every morning.        No current facility-administered medications on file prior to visit.        Past Surgical History:   Procedure Laterality Date    CARPAL TUNNEL RELEASE      CARPAL TUNNEL RELEASE  1980s    left    CARPAL TUNNEL RELEASE  2012    right    ESOPHAGOGASTRODUODENOSCOPY N/A 3/27/2019    Procedure: EGD (ESOPHAGOGASTRODUODENOSCOPY);  Surgeon: Robbin Bar MD;  Location: [facility] (2ND FLR);  Service: Endoscopy;  Laterality: N/A;  BMI 61.3/2nd floor case/svn    INJECTION OF JOINT Bilateral 6/9/2020    Procedure: INJECTION, JOINT, BILATERAL SI;  Surgeon: Lina Wagner MD;  [facility];  Service: Pain Management;  Laterality: Bilateral;  B/L SI Joint Injection    JOINT REPLACEMENT Bilateral     with 2 revisions on rt    KNEE SURGERY  3/2010    orthroscope    KNEE SURGERY  6-19-14    left TKR    LAPAROSCOPIC SLEEVE GASTRECTOMY N/A 8/28/2019    Procedure: GASTRECTOMY, SLEEVE, LAPAROSCOPIC with intraop EGD;  Surgeon: Heriberto Clements MD;  [facility];  Service: General;  Laterality: N/A;    RADIOFREQUENCY ABLATION Right 7/9/2019    Procedure: RADIOFREQUENCY ABLATION;  Surgeon: Lina Wagner MD;  [facility];  Service: Pain Management;  Laterality: Right;  RIGHT RFA L2,3,4,5  2 of 2    RADIOFREQUENCY ABLATION Left 12/19/2019    Procedure: RADIOFREQUENCY ABLATION, LEFT L2-L3-L4-L5 MEDIAL BRANCH 1 OF 2;  Surgeon: Lina Wagner MD;  [facility] [place] [facility];  Service: Pain Management;  Laterality: Left;    RADIOFREQUENCY ABLATION Right 12/31/2019    Procedure: RADIOFREQUENCY ABLATION, RIGHT L2-L3-L4-L5  2 OF 2;  Surgeon: Lina Wagner MD;  [facility] [place] [facility];  Service: Pain Management;  Laterality: Right;    RADIOFREQUENCY ABLATION OF LUMBAR MEDIAL BRANCH NERVE AT  SINGLE LEVEL Left 6/26/2018    Procedure: RADIOFREQUENCY ABLATION, NERVE, MEDIAL BRANCH, LUMBAR, 1 LEVEL;  Surgeon: Lina Wagner MD;  Location: [facility]IN [facility]GT;  Service: Pain Management;  Laterality: Left;  Left Cooled RFA @ L2,3,4,5  [id number]  with Sedation    1 of 2    RADIOFREQUENCY ABLATION OF LUMBAR MEDIAL BRANCH NERVE AT SINGLE LEVEL Right 7/10/2018    Procedure: RADIOFREQUENCY ABLATION, NERVE, MEDIAL BRANCH, LUMBAR, 1 LEVEL;  Surgeon: Lina Wagner MD;  Location: [facility] PAIN [facility]GT;  Service: Pain Management;  Laterality: Right;  RIght Cooled RFA @ L2,3,4,5  [id number] with Sedation    2 of 2    TRIGGER FINGER RELEASE Right 2017    TRIGGER FINGER RELEASE Left 7/29/2019    Procedure: RELEASE, TRIGGER FINGER, Left Thumb;  Surgeon: Velma Garcia MD;  Location: [facility] OR;  Service: Orthopedics;  Laterality: Left;  Local w/ MAC       Social History     Socioeconomic History    Marital status:      Spouse name: Not on file    Number of children: Not on file    Years of education: Not on file    Highest education level: Not on file   Occupational History    Not on file   Social Needs    Financial resource strain: Not on file    Food insecurity     Worry: Not on file     Inability: Not on file    Transportation needs     Medical: Not on file     Non-medical: Not on file   Tobacco Use    Smoking status: Never Smoker    Smokeless tobacco: Never Used   Substance and Sexual Activity    Alcohol use: Not Currently     Comment: occasionally     Drug use: No    Sexual activity: Yes     Partners: Female     Birth control/protection: None   Lifestyle    Physical activity     Days per week: Not on file     Minutes per session: Not on file    Stress: Not on file   Relationships    Social connections     Talks on phone: Not on file     Gets together: Not on file     Attends Orthodoxy service: Not on file     Active member of club or organization: Not on file     Attends meetings of clubs  or organizations: Not on file     Relationship status: Not on file   Other Topics Concern    Not on file   Social History Narrative    Disabled. The patient is the youngest of 6 siblings. Single. Lives with single-sex partner.                    OBJECTIVE:     Vital Signs Range (Last 24H):         Significant Labs:  Lab Results   Component Value Date    WBC 4.82 03/04/2020    HGB 12.2 03/04/2020    HCT 39.3 03/04/2020     03/04/2020    CHOL 152 03/04/2020    TRIG 62 03/04/2020    HDL 51 03/04/2020    ALT 12 03/04/2020    AST 15 03/04/2020     03/04/2020    K 4.3 03/04/2020     03/04/2020    CREATININE 0.8 03/04/2020    BUN 11 03/04/2020    CO2 30 (H) 03/04/2020    TSH 1.411 09/20/2019    INR 1.4 12/01/2014    HGBA1C 6.4 (H) 04/27/2020       Diagnostic Studies: No relevant studies.    EKG:   Results for orders placed or performed in visit on 08/21/19   EKG 12-lead    Collection Time: 08/21/19 12:55 PM    Narrative    Test Reason : E11.9,    Vent. Rate : 075 BPM     Atrial Rate : 075 BPM     P-R Int : 140 ms          QRS Dur : 072 ms      QT Int : 378 ms       P-R-T Axes : 030 049 045 degrees     QTc Int : 422 ms    Normal sinus rhythm  Normal ECG  When compared with ECG of 06-MAY-2019 13:52,  Nonspecific T wave abnormality no longer evident in Lateral leads  Confirmed by Benito Welch MD ([id number]) on 8/26/2019 3:50:18 PM    Referred By: GUILLERMO GARCIA           Confirmed By:Benito Welch MD       2D ECHO:  TTE:  No results found. However, due to the size of the patient record, not all encounters were searched. Please check Results Review for a complete set of results.    VIVIAN:  No results found. However, due to the size of the patient record, not all encounters were searched. Please check Results Review for a complete set of results.    ASSESSMENT/PLAN:                                                                                                                  06/15/2020  Alivia aMrie Cyr is a 54  y.o., female.    Pre-op Assessment    I have reviewed the Patient Summary Reports.       I have reviewed the Medications.     Review of Systems  Anesthesia Hx:  No problems with previous Anesthesia  History of prior surgery of interest to airway management or planning: Denies Family Hx of Anesthesia complications.   Denies Personal Hx of Anesthesia complications.   Social:  Non-Smoker    Hematology/Oncology:     Oncology Normal     EENT/Dental:EENT/Dental Normal   Cardiovascular:   Exercise tolerance: poor Hypertension Stress 2/2019  · The relative PET images are normal showing no clinically significant regional resting or stress induced perfusion defects.  · Gated perfusion images showed an ejection fraction of 77 % at rest and 80 % during stress. Normal is >51%.  · Wall motion was normal at rest and stress.  · LV cavity size is normal at rest and normal at stress.  · The EKG portion of this study is negative for myocardial ischemia.  · Arrhythmias during stress: rare PACs.  · The patient reported headache during the stress test.      Pulmonary:   Asthma mild Sleep Apnea, CPAP    Hepatic/GI:   GERD, well controlled    Musculoskeletal:   Arthritis   Spine Disorders: lumbar    Neurological:   Carpal tunnel   Endocrine:   Diabetes    Psych:   depression          Physical Exam  General:  Morbid Obesity    Airway/Jaw/Neck:  Airway Findings: Mouth Opening: Normal Tongue: Normal  General Airway Assessment: Adult  Improves to I with phonation.  TM Distance: Normal, at least 6 cm      Dental:  Dental Findings: In tact, Molar caps   Chest/Lungs:  Chest/Lungs Clear    Heart/Vascular:  Heart Findings: Rate: Normal  Rhythm: Regular Rhythm     Abdomen:  Abdomen Findings: Normal    Musculoskeletal:  Musculoskeletal Findings: Normal   Skin:  Skin Findings: Normal    Mental Status:  Mental Status Findings:  Cooperative, Alert and Oriented         Anesthesia Plan  Type of Anesthesia, risks & benefits discussed:  Anesthesia Type:   general, MAC  Patient's Preference:   Intra-op Monitoring Plan: standard ASA monitors  Intra-op Monitoring Plan Comments:   Post Op Pain Control Plan: per primary service following discharge from PACU, multimodal analgesia and IV/PO Opioids PRN  Post Op Pain Control Plan Comments:   Induction:   IV  Beta Blocker:  Patient is not currently on a Beta-Blocker (No further documentation required).       Informed Consent: Patient understands risks and agrees with Anesthesia plan.  Questions answered.   ASA Score: 3     Day of Surgery Review of History & Physical:    H&P update referred to the surgeon.     Anesthesia Plan Notes:           Ready For Surgery From Anesthesia Perspective.

## 2020-06-15 NOTE — ANESTHESIA POSTPROCEDURE EVALUATION
Anesthesia Post Evaluation    Patient: Alivia Thomas    Procedure(s) Performed: Procedure(s) (LRB):  COLONOSCOPY (N/A)    Final Anesthesia Type: general    Patient location during evaluation: PACU  Patient participation: Yes- Able to Participate  Level of consciousness: awake and alert  Post-procedure vital signs: reviewed and stable  Pain management: adequate  Airway patency: patent    PONV status at discharge: No PONV  Anesthetic complications: no      Cardiovascular status: stable  Respiratory status: spontaneous ventilation and room air  Hydration status: euvolemic  Follow-up not needed.          Vitals Value Taken Time   /72 06/15/20 0941   Temp 36.2 °C (97.2 °F) 06/15/20 0911   Pulse 55 06/15/20 0941   Resp 14 06/15/20 0941   SpO2 100 % 06/15/20 0941         Event Time   Out of Recovery 09:53:33         Pain/Aracely Score: Aracely Score: 10 (6/15/2020  9:42 AM)

## 2020-06-15 NOTE — DISCHARGE INSTRUCTIONS
Colonoscopy     A camera attached to a flexible tube with a viewing lens is used to take video pictures.     Colonoscopy is a test to view the inside of your lower digestive tract (colon and rectum). Sometimes it can show the last part of the small intestine (ileum). During the test, small pieces of tissue may be removed for testing. This is called a biopsy. Small growths, such as polyps, may also be removed.   Why is colonoscopy done?  The test is done to help look for colon cancer. And it can help find the source of abdominal pain, bleeding, and changes in bowel habits. It may be needed once a year, depending on factors such as your:  · Age  · Health history  · Family health history  · Symptoms  · Results from any prior colonoscopy  Risks and possible complications  These include:  · Bleeding               · A puncture or tear in the colon   · Risks of anesthesia  · A cancer lesion not being seen  Getting ready   To prepare for the test:  · Talk with your healthcare provider about the risks of the test (see below). Also ask your healthcare provider about alternatives to the test.  · Tell your healthcare provider about any medicines you take. Also tell him or her about any health conditions you may have.  · Make sure your rectum and colon are empty for the test. Follow the diet and bowel prep instructions exactly. If you dont, the test may need to be rescheduled.  · Plan for a friend or family member to drive you home after the test.     Colonoscopy provides an inside view of the entire colon.     You may discuss the results with your doctor right away or at a future visit.  During the test   The test is usually done in the hospital on an outpatient basis. This means you go home the same day. The procedure takes about 30 minutes. During that time:  · You are given relaxing (sedating) medicine through an IV line. You may be drowsy, or fully asleep.  · The healthcare provider will first give you a physical exam to  check for anal and rectal problems.  · Then the anus is lubricated and the scope inserted.  · If you are awake, you may have a feeling similar to needing to have a bowel movement. You may also feel pressure as air is pumped into the colon. Its OK to pass gas during the procedure.  · Biopsy, polyp removal, or other treatments may be done during the test.  After the test   You may have gas right after the test. It can help to try to pass it to help prevent later bloating. Your healthcare provider may discuss the results with you right away. Or you may need to schedule a follow-up visit to talk about the results. After the test, you can go back to your normal eating and other activities. You may be tired from the sedation and need to rest for a few hours.  Date Last Reviewed: 11/1/2016 © 2000-2017 [employer]. [address] [place] [address], [place] [address]. All rights reserved. This information is not intended as a substitute for professional medical care. Always follow your healthcare professional's instructions.

## 2020-06-15 NOTE — H&P
Short Stay Endoscopy History and Physical    PCP - Clarisse Richardson MD    Procedure - Colonoscopy  ASA - per anesthesia  Mallampati - per anesthesia  History of Anesthesia problems - no  Family history Anesthesia problems - no   Plan of anesthesia - General    HPI:  54 year old female with a history of HTN, HLD, DM, APDDY, and sleeve gastrectomy who presents for colonoscopy.    Patient completed 75% of prep and reports being clean. She is not on AC.  She had a colonoscopy in 2012 which was remarkable for diverticulosis.  She has no family history of colon cancer.    ROS:  Constitutional: No fevers, chills, No weight loss  CV: No chest pain  Pulm: No cough, No shortness of breath  GI: see HPI  Derm: No rash    Medical History:  has a past medical history of Allergy, Anemia, Asthma, Bilateral shoulder bursitis, Cervical stenosis of spine, Chronic pain, DDD (degenerative disc disease), cervical, Depression (1/28/2019), Diabetes mellitus type II, DJD (degenerative joint disease) of knee (6/19/2014), Facet arthritis of lumbar region (12/17/2015), Facet syndrome (12/17/2015), GERD (gastroesophageal reflux disease), Heartburn, Hyperlipidemia, Hypertension, Morbid obesity, Neuromuscular disorder, PADDY (obstructive sleep apnea), Proteinuria, Right carpal tunnel syndrome, Sacroiliitis (6/13/2018), Sacroiliitis, and Sleep apnea.    Surgical History:  has a past surgical history that includes Carpal tunnel release; Carpal tunnel release (1980s); Carpal tunnel release (2012); Knee surgery (3/2010); Knee surgery (6-19-14); Radiofrequency ablation of lumbar medial branch nerve at single level (Left, 6/26/2018); Radiofrequency ablation of lumbar medial branch nerve at single level (Right, 7/10/2018); Esophagogastroduodenoscopy (N/A, 3/27/2019); Radiofrequency ablation (Right, 7/9/2019); Joint replacement (Bilateral); Trigger finger release (Right, 2017); Trigger finger release (Left, 7/29/2019); Laparoscopic sleeve gastrectomy  (N/A, 8/28/2019); Radiofrequency ablation (Left, 12/19/2019); Radiofrequency ablation (Right, 12/31/2019); and Injection of joint (Bilateral, 6/9/2020).    Family History: family history includes Cancer in her sister; Cataracts in her father and mother; Coronary artery disease in her brother; Diabetes in her brother, brother, father, mother, and sister; Hypertension in her sister; Hypotension in her brother and brother; Kidney failure in her brother; No Known Problems in her sister.. Otherwise no colon cancer, inflammatory bowel disease, or GI malignancies.    Social History:  reports that she has never smoked. She has never used smokeless tobacco. She reports previous alcohol use. She reports that she does not use drugs.    Review of patient's allergies indicates:   Allergen Reactions    Strawberry Anaphylaxis       Medications:   Medications Prior to Admission   Medication Sig Dispense Refill Last Dose    albuterol (VENTOLIN HFA) 90 mcg/actuation inhaler INHALE TWO PUFFS INTO LUNGS EVERY 4 TO 6 HOURS AS NEEDED FOR SHORTNESS OF BREATH AND FOR WHEEZING 18 each 0     allopurinoL (ZYLOPRIM) 300 MG tablet Take 1 tablet (300 mg total) by mouth 2 (two) times daily. 180 tablet 3     atorvastatin (LIPITOR) 20 MG tablet Take 1 tablet (20 mg total) by mouth once daily. 90 tablet 3     b complex vitamins tablet Take 1 tablet by mouth every other day.        calcium citrate/vitamin D2 (ISIAH-CITRATE ORAL) Take 500 mg by mouth 2 (two) times daily.       cyanocobalamin (VITAMIN B-12) 1000 MCG tablet Take 100 mcg by mouth every morning.       FLUoxetine (PROZAC) 20 mg/5 mL (4 mg/mL) solution Take 10 mLs (40 mg total) by mouth once daily. 300 mL 6     fluticasone (FLONASE) 50 mcg/actuation nasal spray 1 spray by Each Nare route 2 (two) times daily as needed for Rhinitis. 15 g 0     gabapentin (NEURONTIN) 300 MG capsule Take 1 pill QHS for 7 days then take 1 pill BID for 7 days then take pill TID 90 capsule 1      levoFLOXacin (LEVAQUIN) 250 mg/10 mL Soln Take 20 mLs (500 mg total) by mouth once daily. 200 mL 0     levoFLOXacin (LEVAQUIN) 500 MG tablet Take 1 tablet (500 mg total) by mouth once daily. 10 tablet 0     MULTIVIT-IRON-MIN-FOLIC ACID 3,500-18-0.4 UNIT-MG-MG ORAL CHEW Take 1 tablet by mouth 2 (two) times daily.        omeprazole (PRILOSEC) 20 MG capsule TAKE 1 CAPSULE (20 MG TOTAL) BY MOUTH EVERY MORNING. 90 capsule 3     ondansetron (ZOFRAN) 4 MG tablet Take 1 tablet (4 mg total) by mouth every 6 (six) hours as needed. 20 tablet 0     oxyCODONE-acetaminophen (PERCOCET)  mg per tablet Take 1 tablet by mouth every 8 (eight) hours as needed for Pain. Greater than 7 day quantity medically necessary 90 tablet 0     vitamin D 185 MG Tab Take 5,000 mg by mouth every morning.             Physical Exam:    Vital Signs: There were no vitals filed for this visit.    General Appearance: Well appearing in no acute distress  Eyes:    No scleral icterus  ENT: Neck supple, Lips, mucosa, and tongue normal; teeth and gums normal  Lungs: CTA bilaterally  Heart:  S1, S2 normal, no murmurs heard  Abdomen: Soft, non tender, non distended with positive bowel sounds.   Extremities: 2+ pulses, no clubbing, cyanosis or edema  Skin: No rash      Labs:  Lab Results   Component Value Date    WBC 4.82 03/04/2020    HGB 12.2 03/04/2020    HCT 39.3 03/04/2020     03/04/2020    CHOL 152 03/04/2020    TRIG 62 03/04/2020    HDL 51 03/04/2020    ALT 12 03/04/2020    AST 15 03/04/2020     03/04/2020    K 4.3 03/04/2020     03/04/2020    CREATININE 0.8 03/04/2020    BUN 11 03/04/2020    CO2 30 (H) 03/04/2020    TSH 1.411 09/20/2019    INR 1.4 12/01/2014    HGBA1C 6.4 (H) 04/27/2020       I have explained the risks and benefits of endoscopy procedures to the patient including but not limited to bleeding, perforation, infection, and death.      Jordin Thompson M.D.  Gastroenterology Fellow, PGY-VI  Pager:  [phone]  [facility]

## 2020-06-15 NOTE — [employer] PATIENT INSTRUCTIONS
Discharge Summary/Instructions after an Endoscopic Procedure  Patient Name: Alivia Thomas  Patient MRN: [id number]  Patient YOB: 1965  Monday, Mary Beth 15, 2020  Jc Koo MD  RESTRICTIONS:  During your procedure today, you received medications for sedation.  These   medications may affect your judgment, balance and coordination.  Therefore,   for 24 hours, you have the following restrictions:   - DO NOT drive a car, operate machinery, make legal/financial decisions,   sign important papers or drink alcohol.    ACTIVITY:  Today: no heavy lifting, straining or running due to procedural   sedation/anesthesia.  The following day: return to full activity including work.  DIET:  Eat and drink normally unless instructed otherwise.     TREATMENT FOR COMMON SIDE EFFECTS:  - Mild abdominal pain, nausea, belching, bloating or excessive gas:  rest,   eat lightly and use a heating pad.  - Sore Throat: treat with throat lozenges and/or gargle with warm salt   water.  - Because air was used during the procedure, expelling large amounts of air   from your rectum or belching is normal.  - If a bowel prep was taken, you may not have a bowel movement for 1-3 days.    This is normal.  SYMPTOMS TO WATCH FOR AND REPORT TO YOUR PHYSICIAN:  1. Abdominal pain or bloating, other than gas cramps.  2. Chest pain.  3. Back pain.  4. Signs of infection such as: chills or fever occurring within 24 hours   after the procedure.  5. Rectal bleeding, which would show as bright red, maroon, or black stools.   (A tablespoon of blood from the rectum is not serious, especially if   hemorrhoids are present.)  6. Vomiting.  7. Weakness or dizziness.  GO DIRECTLY TO THE NEAREST EMERGENCY ROOM IF YOU HAVE ANY OF THE FOLLOWING:      Difficulty breathing              Chills and/or fever over 101 F   Persistent vomiting and/or vomiting blood   Severe abdominal pain   Severe chest pain   Black, tarry stools   Bleeding- more than one tablespoon   Any  other symptom or condition that you feel may need urgent attention  Your doctor recommends these additional instructions:  If any biopsies were taken, your doctors clinic will contact you in 1 to 2   weeks with any results.  - Patient has a contact number available for emergencies.  The signs and   symptoms of potential delayed complications were discussed with the   patient.  Return to normal activities tomorrow.  Written discharge   instructions were provided to the patient.   - Discharge patient to home (ambulatory).   - Resume previous diet.   - Continue present medications.   - Await pathology results.   - Repeat colonoscopy in 5 years for surveillance.  For questions, problems or results please call your physician - Jc Koo MD at Work:  [phone].  [facility], EMERGENCY ROOM PHONE NUMBER: [phone]  IF A COMPLICATION OR EMERGENCY SITUATION ARISES AND YOU ARE UNABLE TO REACH   YOUR PHYSICIAN - GO DIRECTLY TO THE EMERGENCY ROOM.  Jc Koo MD  6/15/2020 9:06:38 AM  This report has been verified and signed electronically.  [employer]

## 2020-06-22 ENCOUNTER — NURSE TRIAGE (OUTPATIENT)
Dept: ADMINISTRATIVE | Facility: CLINIC | Age: 55
End: 2020-06-22

## 2020-06-23 NOTE — TELEPHONE ENCOUNTER
Pt contacted through Post Procedural Symptom Tracking. Denies any cough, fever, or difficulty breathing since procedure.  Pt instructed to call [facility] or contact provider with any changes of condition or concerns.   dy 14    Reason for Disposition   Second attempt to contact family AND no contact made. Phone number verified.    Protocols used: NO CONTACT OR DUPLICATE CONTACT CALL-A-[place]

## 2020-06-24 ENCOUNTER — OFFICE VISIT (OUTPATIENT)
Dept: PAIN MEDICINE | Facility: CLINIC | Age: 55
End: 2020-06-24
Payer: MEDICARE

## 2020-06-24 ENCOUNTER — TELEPHONE (OUTPATIENT)
Dept: INTERNAL MEDICINE | Facility: CLINIC | Age: 55
End: 2020-06-24

## 2020-06-24 DIAGNOSIS — M25.562 CHRONIC PAIN OF BOTH KNEES: ICD-10-CM

## 2020-06-24 DIAGNOSIS — G89.29 CHRONIC PAIN OF BOTH KNEES: ICD-10-CM

## 2020-06-24 DIAGNOSIS — M53.3 SACROILIAC JOINT PAIN: Primary | ICD-10-CM

## 2020-06-24 DIAGNOSIS — M25.561 CHRONIC PAIN OF BOTH KNEES: ICD-10-CM

## 2020-06-24 DIAGNOSIS — M47.816 LUMBAR SPONDYLOSIS: ICD-10-CM

## 2020-06-24 DIAGNOSIS — Z96.653 STATUS POST TOTAL BILATERAL KNEE REPLACEMENT: ICD-10-CM

## 2020-06-24 DIAGNOSIS — G89.4 CHRONIC PAIN SYNDROME: ICD-10-CM

## 2020-06-24 DIAGNOSIS — M51.36 DDD (DEGENERATIVE DISC DISEASE), LUMBAR: ICD-10-CM

## 2020-06-24 PROCEDURE — 99441 PR PHYSICIAN TELEPHONE EVALUATION 5-10 MIN: ICD-10-PCS | Mod: 95,,, | Performed by: NURSE PRACTITIONER

## 2020-06-24 PROCEDURE — 99441 PR PHYSICIAN TELEPHONE EVALUATION 5-10 MIN: CPT | Mod: 95,,, | Performed by: NURSE PRACTITIONER

## 2020-06-24 NOTE — TELEPHONE ENCOUNTER
----- Message from Virginia Cm sent at 6/24/2020 12:18 PM CDT -----  Contact: Patient [phone]  Patient would like to get medical advice.     Comments: patient calling stating spouse was tested positive for Covid yesterday, patient was recently tested for the up coming appt, wants to know if should be tested again, prior to appt, call to discuss.     Please call an advise  Thank you

## 2020-06-24 NOTE — TELEPHONE ENCOUNTER
Spoke with  Pt and she wants to know if she should retest,pt informed to retest only if she have any sx.pt understood and don't have any sx right now

## 2020-06-24 NOTE — TELEPHONE ENCOUNTER
----- Message from Ronnie Viramontes sent at 6/24/2020  7:50 AM CDT -----  Regarding: Advice  Contact: Patient [phone]  Patient would like to get medical advice.    Comments: patient calling stating spouse was tested positive for Covid yesterday, patient was recently tested for the up coming appt, wants to know if should be tested again, prior to appt, call to discuss.    Please call an advise  Thank you

## 2020-06-24 NOTE — PROGRESS NOTES
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 all appears to be appropriately placed and the next follow-up is in 6 weeks.  The patient continues to work on weight loss and exercise and states that she has been doing more than in the past.   Patient reports no other health changes since previous encounter.    Interval History 04/09/2015:  Patient presents in clinic for one month follow up. She reports bilateral knee pain and low back pain is a 9/10 today. She currently takes percocet for pain as needed and uses a cane for ambulation. She states that the low back pain is new.  She continues to have bilateral knee pain and is in physical therapy.  She continues to take Celebrex daily and uses a topical pain cream regularly.    Patient reports no other health changes since previous encounter.    Interval history 3/5/2015:  Since previous encounter patient is status post right total knee replacement on 11/4/2014 and has been healed with postoperative visits showing good progress.  The patient does have continued physical therapy sessions and has been attempting to lose weight and has lost approximately 25 pounds although her BMI continues to be 54.  She has been making good efforts to try and continue to increase her range of motion and lose weight.  She continues to have significant pain in bilateral knees and continues to use a cane for  ambulation.  She was receiving oxycodone/acetaminophen 10/325 every 8 hours by mouth when necessary and requiring in order to persist in her physical therapy although the topical pain cream that she has been applying has been helping her to a limited degree she still requires the medication regularly.  Her recent x-ray imaging shows good positioning of the prosthesis.  No other health changes since previous encounter.     Interval history 2/17/2014:  Patient reports that she has been using the topical compounded cream on the knee approximately 4 times per day and she states that it does help her with her pain that she is experiencing.  She states that she has not gone to formal physical therapy but that she has been going to a pool that has exercise classes which is free for her and that she feels like it is helping her continue to lose weight.  Additionally she continues using hydrocodone/acetaminophen 7.5/750 approximately 3 times per day when necessary and states that also helps with her pain symptoms.  He she's still talks to have replacement for the left knee, but would like to lose weight further before going to that.  She has also had previous injections into her knees which have offered little to no relief in her pain symptoms.  She has had no other health changes since previous encounter.    Previous encounter 1/21/2014:  HPI Comments: 49 yo female presents for initial evaluation of bilateral knee pain, L>R. She is s/p arthroscopic right knee surgery and eventual replacement and then revision surgeries (Dr. Swan, Orthopedics). The pain is present in both knees and is described as a terrible ache. She hears occasional popping in both knees with movement. The pain is worse with being on her feet and getting up from a sitting to standing position. Denies lower ext weakness or paresthesias. She does not have back pain or pain radiating down her legs. The pain is better with Celebrex and rest. She also takes  Vicodin ES TID but this makes her very sleepy. She is not sure it helps with the pain because she usually falls asleep.     Physical Therapy: not since 2011 just prior to her 3rd right knee surgery (revision after replacement); has tried swimming which helps with weight loss    Non-pharmacologic Treatment: none    Pain Medications: Percocet and celebrex    Blood thinners: ASA 81 mg daily     Interventional Therapies:   steroid inj and visco-supplementation (series of 3) in left knee- not helpful  3/31/14 Bilateral genicular nerve blocks  2/16/16 Bilateral L2,3,4,5 MBB  3/1/16 Bilateral L2,3,4,5 MBB   4/6/16 Left L2,3,4,5 RFA- significant relief  4/20/16 Right L2,3,4,5 RFA- significant relief  10/5/16 Left L2,3,4,5 cooled RFA  10/19/16 Right L2,3,4,5 cooled RFA  4/26/17 Left L2,3,4,5 cooled RFA  5/9/17 Right L2,3,4,5 cooled RFA  12/26/2017- Left L2,3,4,5 cooled RFA  1/9/2018- Right L2,3,4,5 cooled RFA  6/26/18 Left L2,3,4,5 cooled RFA- 60% relief  7/10/18 Right L2,3,4,5 cooled RFA- 60% relief  9/28/18 TPIs- significant relief  12/27/18 Left L2,3,4,5 RFA- 70% relief  1/29/19 Right L2,3,4,5 RFA- 70% relief  6/18/19 Left L2,3,4,5 RFA- 70% relief  7/9/19 Right L2,3,4,5 RFA- 50% relief  12/19/19 Left L2,3,4,5 RFA- 80% relief  12/31/19 Right L2,3,4,5 RFA- 50% relief  3/12/20 Left subacromial bursa injection- 90% relief  5/18/2020- Left genicular nerve block  6/9/2020- Bilateral SI joint injections- 50% relief    Relevant Surgeries: right knee surgery x3 (arthroscopic, then replacement and subsequent revision surgery), s/p left total knee arthroplasty    Relevant Imaging:  Xray Lumbar spine 09/21/2015  Lumbar spine radiograph    Comparison: None    Results: AP, lateral neutral, lateral flexion , lateral extension, bilateral oblique and spot views. The alignment of the lumbar spine demonstrates a mild levoscoliosis . 11-mm anterior listhesis of L4 relative to L5 with no translational abnormalities  seen on flexion and  extension views. The vertebral body heights are well-maintained , mild disk space narrowing L4-L5 and L5-S1. Mild anterior and marginal osteophyte formation seen throughout the lumbar spine . The oblique views demonstrate no   definite spondylolysis. There is facet joint osseous hypertrophy noted at L3-L4 and L4-L5.      Impression         The Significant spondylosis of the lumbar spine with grade 1 anterior listhesis L4 relative to L5.  Facet joint osseous hypertrophy L3-L4 and L4-5.      Electronically signed by: BLESSING ROE MD  Date: 09/21/15  Time: 09:56          X-ray knee bilateral 8/26/2015:  Standing AP knees, lateral view of both knees and sunrise view of both patella. Study compared to May 2015. Postop change of bilateral knee replacement. The prosthetic components are in satisfactory position. Erosive changes involving the patella   again evident bilaterally.    Impression no significant change.      Xray Bilateral Knee 05/25/2015:  There are bilateral total knee arthroplasties with posterior resurfacing of the patella. As observed on 12/17/2014 there is a fracture of the left patella in the parasagittal plane.    Heterotopic bone is evident about each knee.    I detect no dislocation, unusual radiopaque retained foreign body, lytic or blastic lesion, or chondrocalcinosis.    Cervical MRI 4/24/2018:    Narrative     EXAMINATION:  MRI CERVICAL SPINE WITHOUT CONTRAST    CLINICAL HISTORY:  Neck pain, first study;.  Cervicalgia.    TECHNIQUE:  Multiplanar, multisequence MR images of the cervical spine were acquired without the administration of contrast.    COMPARISON:  None.    FINDINGS:  There is reversal of the normal cervical lordosis which could be related to patient positioning versus muscle spasm.    Cervical vertebral body heights are well maintained without evidence of acute fracture or dislocation.  Marrow signal is unremarkable.    Spinal canal contents are unremarkable.  No abnormal masses  or collections.    Individual levels as detailed below:    C2-3: No significant spinal canal stenosis or neuroforaminal narrowing.    C3-4: Facet arthropathy.  No significant spinal canal stenosis or neuroforaminal narrowing.    C4-5: Facet arthropathy.  Small broad-based disc osteophyte complex.  Mild right neuroforaminal narrowing.  Mild spinal canal stenosis.    C5-6: Facet arthropathy.  Uncovertebral joint spurring.  Broad-based posterior disc osteophyte complex.  Mild right neuroforaminal narrowing.  Mild spinal canal stenosis.    C6-7: Facet arthropathy.  No significant spinal canal stenosis or neuroforaminal narrowing.    C7-T1: No significant spinal canal stenosis or neuroforaminal narrowing.    Paraspinal muscles are unremarkable.  Soft tissues are intact.   Impression       Mild multilevel cervical spondylosis with mild spinal canal stenosis and mild right neuroforaminal narrowing at C4-5 and C5-6.       Lab Results   Component Value Date    HGBA1C 6.4 (H) 04/27/2020     Lab Results   Component Value Date    CHOL 152 03/04/2020    CHOL 160 01/02/2020    CHOL 221 (H) 11/14/2019     Lab Results   Component Value Date    HDL 51 03/04/2020    HDL 49 01/02/2020    HDL 46 11/14/2019     Lab Results   Component Value Date    LDLCALC 88.6 03/04/2020    LDLCALC 97.8 01/02/2020    LDLCALC 157.0 11/14/2019     Lab Results   Component Value Date    TRIG 62 03/04/2020    TRIG 66 01/02/2020    TRIG 90 11/14/2019     Lab Results   Component Value Date    CHOLHDL 33.6 03/04/2020    CHOLHDL 30.6 01/02/2020    CHOLHDL 20.8 11/14/2019         Past Medical History:   Diagnosis Date    Allergy     Anemia     Asthma     Bilateral shoulder bursitis     Cervical stenosis of spine     Chronic pain     DDD (degenerative disc disease), cervical     Depression 1/28/2019    Diabetes mellitus type II     DJD (degenerative joint disease) of knee 6/19/2014    Facet arthritis of lumbar region 12/17/2015    Facet syndrome  12/17/2015    GERD (gastroesophageal reflux disease)     Heartburn     Hyperlipidemia     Hypertension     Morbid obesity     Neuromuscular disorder     PADDY (obstructive sleep apnea)     Proteinuria     Right carpal tunnel syndrome     Sacroiliitis 6/13/2018    Sacroiliitis     Sleep apnea      Past Surgical History:   Procedure Laterality Date    CARPAL TUNNEL RELEASE      CARPAL TUNNEL RELEASE  1980s    left    CARPAL TUNNEL RELEASE  2012    right    COLONOSCOPY N/A 6/15/2020    Procedure: COLONOSCOPY;  Surgeon: Jc Koo MD;  Location: [facility] (4TH FLR);  Service: Endoscopy;  Laterality: N/A;  COVID screening on 6/13/20 at [facility]-  pt updated on drop off location and no visitor policy-    ESOPHAGOGASTRODUODENOSCOPY N/A 3/27/2019    Procedure: EGD (ESOPHAGOGASTRODUODENOSCOPY);  Surgeon: Robbin Bar MD;  Location: [facility] (2ND FLR);  Service: Endoscopy;  Laterality: N/A;  BMI 61.3/2nd floor case/svn    INJECTION OF JOINT Bilateral 6/9/2020    Procedure: INJECTION, JOINT, BILATERAL SI;  Surgeon: Lina Wagner MD;  Location: [facility] PAIN MGT;  Service: Pain Management;  Laterality: Bilateral;  B/L SI Joint Injection    JOINT REPLACEMENT Bilateral     with 2 revisions on rt    KNEE SURGERY  3/2010    orthroscope    KNEE SURGERY  6-19-14    left TKR    LAPAROSCOPIC SLEEVE GASTRECTOMY N/A 8/28/2019    Procedure: GASTRECTOMY, SLEEVE, LAPAROSCOPIC with intraop EGD;  Surgeon: Heriberto Clements MD;  Location: [facility] OR 2ND FLR;  Service: General;  Laterality: N/A;    RADIOFREQUENCY ABLATION Right 7/9/2019    Procedure: RADIOFREQUENCY ABLATION;  Surgeon: Lina Wagner MD;  Location: [facility] PAIN MGT;  Service: Pain Management;  Laterality: Right;  RIGHT RFA L2,3,4,5  2 of 2    RADIOFREQUENCY ABLATION Left 12/19/2019    Procedure: RADIOFREQUENCY ABLATION, LEFT L2-L3-L4-L5 MEDIAL BRANCH 1 OF 2;  Surgeon: Lina Wagner MD;  Location: [facility] PAIN MGT;  Service: Pain  Management;  Laterality: Left;    RADIOFREQUENCY ABLATION Right 12/31/2019    Procedure: RADIOFREQUENCY ABLATION, RIGHT L2-L3-L4-L5  2 OF 2;  Surgeon: Lina Wagner MD;  Location: [facility] [place] [facility]IN MGT;  Service: Pain Management;  Laterality: Right;    RADIOFREQUENCY ABLATION OF LUMBAR MEDIAL BRANCH NERVE AT SINGLE LEVEL Left 6/26/2018    Procedure: RADIOFREQUENCY ABLATION, NERVE, MEDIAL BRANCH, LUMBAR, 1 LEVEL;  Surgeon: Lina Wagner MD;  Location: [facility] [place] PAIN MGT;  Service: Pain Management;  Laterality: Left;  Left Cooled RFA @ L2,3,4,5  [id number]  with Sedation    1 of 2    RADIOFREQUENCY ABLATION OF LUMBAR MEDIAL BRANCH NERVE AT SINGLE LEVEL Right 7/10/2018    Procedure: RADIOFREQUENCY ABLATION, NERVE, MEDIAL BRANCH, LUMBAR, 1 LEVEL;  Surgeon: Lina Wagner MD;  Location: [facility] [place] PAIN MGT;  Service: Pain Management;  Laterality: Right;  RIght Cooled RFA @ L2,3,4,5  [id number] with Sedation    2 of 2    TRIGGER FINGER RELEASE Right 2017    TRIGGER FINGER RELEASE Left 7/29/2019    Procedure: RELEASE, TRIGGER FINGER, Left Thumb;  Surgeon: Velma Garcia MD;  Location: [facility] [place] OR;  Service: Orthopedics;  Laterality: Left;  Local w/ MAC     Family History   Problem Relation Age of Onset    Diabetes Mother     Cataracts Mother     Diabetes Father     Cataracts Father     Coronary artery disease Brother     Diabetes Brother     Hypertension Sister     Diabetes Sister     No Known Problems Sister     Cancer Sister         lymphoma     Diabetes Brother     Hypotension Brother     Kidney failure Brother     Hypotension Brother     Amblyopia Neg Hx     Blindness Neg Hx     Glaucoma Neg Hx     Macular degeneration Neg Hx     Retinal detachment Neg Hx     Strabismus Neg Hx     Stroke Neg Hx     Thyroid disease Neg Hx      Social History     Socioeconomic History    Marital status:      Spouse name: Not on file    Number of children: Not on file    Years of education: Not on file     Highest education level: Not on file   Occupational History    Not on file   Social Needs    Financial resource strain: Not on file    Food insecurity     Worry: Not on file     Inability: Not on file    Transportation needs     Medical: Not on file     Non-medical: Not on file   Tobacco Use    Smoking status: Never Smoker    Smokeless tobacco: Never Used   Substance and Sexual Activity    Alcohol use: Not Currently     Comment: occasionally     Drug use: No    Sexual activity: Yes     Partners: Female     Birth control/protection: None   Lifestyle    Physical activity     Days per week: Not on file     Minutes per session: Not on file    Stress: Not on file   Relationships    Social connections     Talks on phone: Not on file     Gets together: Not on file     Attends Denominational service: Not on file     Active member of club or organization: Not on file     Attends meetings of clubs or organizations: Not on file     Relationship status: Not on file   Other Topics Concern    Not on file   Social History Narrative    Disabled. The patient is the youngest of 6 siblings. Single. Lives with single-sex partner.                  Review of patient's allergies indicates:  No Known Allergies    Medication List with Changes/Refills   Current Medications    ALBUTEROL (VENTOLIN HFA) 90 MCG/ACTUATION INHALER    INHALE TWO PUFFS INTO LUNGS EVERY 4 TO 6 HOURS AS NEEDED FOR SHORTNESS OF BREATH AND FOR WHEEZING    ALLOPURINOL (ZYLOPRIM) 300 MG TABLET    Take 1 tablet (300 mg total) by mouth 2 (two) times daily.    ATORVASTATIN (LIPITOR) 20 MG TABLET    Take 1 tablet (20 mg total) by mouth once daily.    B COMPLEX VITAMINS TABLET    Take 1 tablet by mouth every other day.     CALCIUM CITRATE/VITAMIN D2 (ISIAH-CITRATE ORAL)    Take 500 mg by mouth 2 (two) times daily.    CYANOCOBALAMIN (VITAMIN B-12) 1000 MCG TABLET    Take 100 mcg by mouth every morning.    FLUOXETINE (PROZAC) 20 MG/5 ML (4 MG/ML) SOLUTION    Take 10  mLs (40 mg total) by mouth once daily.    FLUTICASONE (FLONASE) 50 MCG/ACTUATION NASAL SPRAY    1 spray by Each Nare route 2 (two) times daily as needed for Rhinitis.    GABAPENTIN (NEURONTIN) 300 MG CAPSULE    Take 1 pill QHS for 7 days then take 1 pill BID for 7 days then take pill TID    LEVOFLOXACIN (LEVAQUIN) 250 MG/10 ML SOLN    Take 20 mLs (500 mg total) by mouth once daily.    LEVOFLOXACIN (LEVAQUIN) 500 MG TABLET    Take 1 tablet (500 mg total) by mouth once daily.    MULTIVIT-IRON-MIN-FOLIC ACID 3,500-18-0.4 UNIT-MG-MG ORAL CHEW    Take 1 tablet by mouth 2 (two) times daily.     OMEPRAZOLE (PRILOSEC) 20 MG CAPSULE    TAKE 1 CAPSULE (20 MG TOTAL) BY MOUTH EVERY MORNING.    ONDANSETRON (ZOFRAN) 4 MG TABLET    Take 1 tablet (4 mg total) by mouth every 6 (six) hours as needed.    OXYCODONE-ACETAMINOPHEN (PERCOCET)  MG PER TABLET    Take 1 tablet by mouth every 8 (eight) hours as needed for Pain. Greater than 7 day quantity medically necessary    VITAMIN D 185 MG TAB    Take 5,000 mg by mouth every morning.          REVIEW OF SYSTEMS:    GENERAL:  Recent weight loss.  RESPIRATORY:  Negative for cough, wheezing or shortness of breath, patient denies any recent URI.  CARDIOVASCULAR:  Negative for chest pain, leg swelling or palpitations.  GI:  Negative for abdominal discomfort, blood in stools or black stools or change in bowel habits, occasional constipation.  MUSCULOSKELETAL:  See HPI.  SKIN:  Negative for lesions, rash, and itching.  PSYCH:  No mood disorder or recent psychosocial stressors.  Patient's sleep is disturbed secondary to pain (patient reports also that she has insomnia).  HEMATOLOGY/LYMPHOLOGY:  Negative for prolonged bleeding, bruising easily or swollen nodes.  81 mg aspirin  ENDO: Patient has a history of diabetes.  NEURO:   No history of headaches, syncope, paralysis, seizures or tremors.  All other reviewed and negative other than HPI.    OBJECTIVE:    LMP 06/26/2018     Limited Physical  Exam due to virtual visit:    PHYSICAL EXAMINATION:  Voice normal.  Normal affect without evident distress of distress.        Assessment:       Encounter Diagnoses   Name Primary?    Sacroiliac joint pain Yes    Lumbar spondylosis     DDD (degenerative disc disease), lumbar     Chronic pain of both knees     Status post total bilateral knee replacement     Chronic pain syndrome          Plan:       - Previous imaging was reviewed and discussed with the patient today.     - She is s/p bilateral SI joint injections with benefit. We can repeat this as needed.     - We can repeat left genicular nerve block as needed.     - She may also benefit from repeat lumbar RFA.     - Continue Percocet 10/325 mg TID PRN. She does not need a refill today. She may call for refills.     - The patient is here today for a refill of current pain medications and they believe these provide effective pain control and improvements in quality of life.  The patient notes no serious side effects, and feels the benefits outweigh the risks.  The patient was reminded of the pain contract that they signed previously as well as the risks and benefits of the medication including possible death.  The updated [place] [employer] prescription monitoring program was reviewed, and the patient has been found to be compliant with current treatment plan.    - RTC in 3 months or sooner if needed.      The above plan and management options were discussed at length with patient. Patient is in agreement with the above and verbalized understanding.     Adeola Robledo NP  06/24/2020

## 2020-06-25 ENCOUNTER — HOSPITAL ENCOUNTER (EMERGENCY)
Facility: HOSPITAL | Age: 55
Discharge: HOME | End: 2020-06-25
Attending: EMERGENCY MEDICINE
Payer: COMMERCIAL

## 2020-06-25 VITALS
HEIGHT: 65 IN | HEART RATE: 75 BPM | OXYGEN SATURATION: 96 % | RESPIRATION RATE: 18 BRPM | BODY MASS INDEX: 40.82 KG/M2 | SYSTOLIC BLOOD PRESSURE: 149 MMHG | TEMPERATURE: 99 F | WEIGHT: 245 LBS | DIASTOLIC BLOOD PRESSURE: 90 MMHG

## 2020-06-25 DIAGNOSIS — U07.1 COVID-19 VIRUS DETECTED: Primary | ICD-10-CM

## 2020-06-25 DIAGNOSIS — U07.1 COVID-19 VIRUS DETECTED: ICD-10-CM

## 2020-06-25 LAB — SARS-COV-2 RDRP RESP QL NAA+PROBE: POSITIVE

## 2020-06-25 PROCEDURE — U0002 COVID-19 LAB TEST NON-CDC: HCPCS

## 2020-06-25 PROCEDURE — 99284 PR EMERGENCY DEPT VISIT,LEVEL IV: ICD-10-PCS | Mod: ,,, | Performed by: PHYSICIAN ASSISTANT

## 2020-06-25 PROCEDURE — 99284 EMERGENCY DEPT VISIT MOD MDM: CPT | Mod: ,,, | Performed by: PHYSICIAN ASSISTANT

## 2020-06-25 PROCEDURE — 99282 EMERGENCY DEPT VISIT SF MDM: CPT

## 2020-06-25 NOTE — ED NOTES
LOC: The patient is awake, alert and aware of environment with an appropriate affect, the patient is oriented x 3 and speaking appropriately.  APPEARANCE: Patient resting comfortably and in no acute distress, patient is clean and well groomed, patient's clothing is properly fastened.  SKIN: The skin is warm and dry, color consistent with ethnicity, patient has normal skin turgor and moist mucus membranes, skin intact, no breakdown or bruising noted.  MUSCULOSKELETAL: Patient moving all extremities spontaneously, no obvious swelling or deformities noted.  RESPIRATORY: Airway is open and patent, respirations are spontaneous, patient has a normal effort and rate, no accessory muscle use noted    Patient states her wife was diagnosised with covid last week, patient states she was in an outpatient setting over a week ago for a routine colostopy, patient denies any covid symptoms, patient states she just wants to make sure she does not have covid so she can continue to go in public without any issues, no acute distress noted, will continue to monitor

## 2020-06-25 NOTE — DISCHARGE INSTRUCTIONS
Your test was POSITIVE for COVID-19 (coronavirus).       Prevention steps for patients with confirmed COVID-19      Stay home and stay away from family members and friends. The CDC says, you can leave home after these three things have happened: 1) You have had no fever for at least 72 hours (that is three full days of no fever without the use of medicine that reduces fevers) 2) AND other symptoms have improved (for example, when your cough or shortness of breath have improved) 3) AND at least 10 days have passed since your symptoms first appeared.  Separate yourself from other people and animals in your home.  Call ahead before visiting your doctor.  Wear a facemask.  Cover your coughs and sneezes.  Wash your hands often with soap and water; hand  can be used, too.  Avoid sharing personal household items.  Wipe down surfaces used daily.  Monitor your symptoms. Seek prompt medical attention if your illness is worsening (e.g., difficulty breathing).   Before seeking care, call your healthcare provider.  If you have a medical emergency and need to call 911, notify the dispatch personnel that you have, or are being evaluated for COVID-19. If possible, put on a facemask before emergency medical services arrive.        Recommended precautions for household members, intimate partners, and caregivers in a home setting of a patient with symptomatic laboratory-confirmed COVID-19 or a patient under investigation.  Household members, intimate partners, and caregivers in the home setting awaiting tests results have close contact with a person with symptomatic, laboratory-confirmed COVID-19 or a person under investigation. Close contacts should monitor their health; they should call their provider right away if they develop symptoms suggestive of COVID-19 (e.g., fever, cough, shortness of breath).    Close contacts should also follow these recommendations:  Make sure that you understand and can help the patient follow  their provider's instructions for medication(s) and care. You should help the patient with basic needs in the home and provide support for getting groceries, prescriptions, and other personal needs.  Monitor the patient's symptoms. If the patient is getting sicker, call his or her healthcare provider and tell them that the patient has laboratory-confirmed COVID-19. If the patient has a medical emergency and you need to call 911, notify the dispatch personnel that the patient has, or is being evaluated for COVID-19.  Household members should stay in another room or be  from the patient. Household members should use a separate bedroom and bathroom, if available.  Prohibit visitors.  Household members should care for any pets in the home.  Make sure that shared spaces in the home have good air flow, such as by an air conditioner or an opened window, weather permitting.  Perform hand hygiene frequently. Wash your hands often with soap and water for at least 20 seconds or use an alcohol-based hand  (that contains > 60% alcohol) covering all surfaces of your hands and rubbing them together until they feel dry. Soap and water should be used preferentially.  Avoid touching your eyes, nose, and mouth.  The patient should wear a facemask. If the patient is not able to wear a facemask (for example, because it causes trouble breathing), caregivers should wear a mask when they are in the same room as the patient.  Wear a disposable facemask and gloves when you touch or have contact with the patient's blood, stool, or body fluids, such as saliva, sputum, nasal mucus, vomit, urine.  Throw out disposable facemasks and gloves after using them. Do not reuse.  When removing personal protective equipment, first remove and dispose of gloves. Then, immediately clean your hands with soap and water or alcohol-based hand . Next, remove and dispose of facemask, and immediately clean your hands again with soap and  water or alcohol-based hand .  You should not share dishes, drinking glasses, cups, eating utensils, towels, bedding, or other items with the patient. After the patient uses these items, you should wash them thoroughly (see below Wash laundry thoroughly).  Clean all high-touch surfaces, such as counters, tabletops, doorknobs, bathroom fixtures, toilets, phones, keyboards, tablets, and bedside tables, every day. Also, clean any surfaces that may have blood, stool, or body fluids on them.  Use a household cleaning spray or wipe, according to the label instructions. Labels contain instructions for safe and effective use of the cleaning product including precautions you should take when applying the product, such as wearing gloves and making sure you have good ventilation during use of the product.  Wash laundry thoroughly.  Immediately remove and wash clothes or bedding that have blood, stool, or body fluids on them.  Wear disposable gloves while handling soiled items and keep soiled items away from your body. Clean your hands (with soap and water or an alcohol-based hand ) immediately after removing your gloves.  Read and follow directions on labels of laundry or clothing items and detergent. In general, using a normal laundry detergent according to washing machine instructions and dry thoroughly using the warmest temperatures recommended on the clothing label.  Place all used disposable gloves, facemasks, and other contaminated items in a lined container before disposing of them with other household waste. Clean your hands (with soap and water or an alcohol-based hand ) immediately after handling these items. Soap and water should be used preferentially if hands are visibly dirty.  Discuss any additional questions with your state or local health department or healthcare provider. Check available hours when contacting your local health department.    For more information see CDC link  below.      https://www.cdc.gov/coronavirus/2019-ncov/hcp/guidance-prevent-spread.html#precautions        Sources:  [facility], [place] [facility] and \[facility] [place] [facility]\""     Sincerely,     Erendira Davis PA-C

## 2020-06-25 NOTE — ED PROVIDER NOTES
"Encounter Date: 6/25/2020       History     Chief Complaint   Patient presents with    COVID-19 Concerns     Pt states wife recently diagnosed with COVID and wishes to be tested to see if she has it too. Pt denies symptoms at this.     Ms Thomas is a 54yoF who presents for COVID testing; pertinent PMHx h/o asthma, DM 2, obesity, HTN, HLD. Patient's wife is currently displaying symptoms and tested positive several days ago. Patient presents for desire to test "just to see". She denies symptoms and reports she has been feeling fine. No antipyretic use PTA.  The patients available PMH, PSH, Social History, medications, allergies, and triage vital signs were reviewed just prior to their medical evaluation.    Please be advised this text was dictated with Lumiata software and may contain errors due to translation.           Review of patient's allergies indicates:   Allergen Reactions    Strawberry Anaphylaxis     Past Medical History:   Diagnosis Date    Allergy     Anemia     Asthma     Bilateral shoulder bursitis     Cervical stenosis of spine     Chronic pain     DDD (degenerative disc disease), cervical     Depression 1/28/2019    Diabetes mellitus type II     DJD (degenerative joint disease) of knee 6/19/2014    Facet arthritis of lumbar region 12/17/2015    Facet syndrome 12/17/2015    GERD (gastroesophageal reflux disease)     Heartburn     Hyperlipidemia     Hypertension     Morbid obesity     Neuromuscular disorder     PADDY (obstructive sleep apnea)     Proteinuria     Right carpal tunnel syndrome     Sacroiliitis 6/13/2018    Sacroiliitis     Sleep apnea      Past Surgical History:   Procedure Laterality Date    CARPAL TUNNEL RELEASE      CARPAL TUNNEL RELEASE  1980s    left    CARPAL TUNNEL RELEASE  2012    right    COLONOSCOPY N/A 6/15/2020    Procedure: COLONOSCOPY;  Surgeon: Jc Koo MD;  Location: [facility] ([address] [place] [address]);  Service: Endoscopy;  Laterality: N/A;  COVID " screening on 6/13/20 at [facility]  pt updated on drop off location and no visitor [facility]    ESOPHAGOGASTRODUODENOSCOPY N/A 3/27/2019    Procedure: EGD (ESOPHAGOGASTRODUODENOSCOPY);  Surgeon: Robbin Bar MD;  Location: [facility] (2ND FLR);  Service: Endoscopy;  Laterality: N/A;  BMI 61.3/2nd floor case/svn    INJECTION OF JOINT Bilateral 6/9/2020    Procedure: INJECTION, JOINT, BILATERAL SI;  Surgeon: Lina Wagner MD;  Location: [facility];  Service: Pain Management;  Laterality: Bilateral;  B/L SI Joint Injection    JOINT REPLACEMENT Bilateral     with 2 revisions on rt    KNEE SURGERY  3/2010    orthroscope    KNEE SURGERY  6-19-14    left TKR    LAPAROSCOPIC SLEEVE GASTRECTOMY N/A 8/28/2019    Procedure: GASTRECTOMY, SLEEVE, LAPAROSCOPIC with intraop EGD;  Surgeon: Heriberto Clements MD;  Location: [facility] OR 2ND FLR;  Service: General;  Laterality: N/A;    RADIOFREQUENCY ABLATION Right 7/9/2019    Procedure: RADIOFREQUENCY ABLATION;  Surgeon: Lina Wagner MD;  Location: [facility];  Service: Pain Management;  Laterality: Right;  RIGHT RFA L2,3,4,5  2 of 2    RADIOFREQUENCY ABLATION Left 12/19/2019    Procedure: RADIOFREQUENCY ABLATION, LEFT L2-L3-L4-L5 MEDIAL BRANCH 1 OF 2;  Surgeon: Lina Wagner MD;  Location: [facility];  Service: Pain Management;  Laterality: Left;    RADIOFREQUENCY ABLATION Right 12/31/2019    Procedure: RADIOFREQUENCY ABLATION, RIGHT L2-L3-L4-L5  2 OF 2;  Surgeon: Lina Wagner MD;  Location: [facility];  Service: Pain Management;  Laterality: Right;    RADIOFREQUENCY ABLATION OF LUMBAR MEDIAL BRANCH NERVE AT SINGLE LEVEL Left 6/26/2018    Procedure: RADIOFREQUENCY ABLATION, NERVE, MEDIAL BRANCH, LUMBAR, 1 LEVEL;  Surgeon: Lina Wagner MD;  Location: [facility];  Service: Pain Management;  Laterality: Left;  Left Cooled RFA @ L2,3,4,5  [id number]  with Sedation    1 of 2    RADIOFREQUENCY ABLATION OF LUMBAR MEDIAL BRANCH  "NERVE AT SINGLE LEVEL Right 7/10/2018    Procedure: RADIOFREQUENCY ABLATION, NERVE, MEDIAL BRANCH, LUMBAR, 1 LEVEL;  Surgeon: Lina Wagner MD;  Location: [facility];  Service: Pain Management;  Laterality: Right;  RIght Cooled RFA @ L2,3,4,5  [id number] with Sedation    2 of 2    TRIGGER FINGER RELEASE Right 2017    TRIGGER FINGER RELEASE Left 7/29/2019    Procedure: RELEASE, TRIGGER FINGER, Left Thumb;  Surgeon: Velma Garcia MD;  Location: [facility] OR;  Service: Orthopedics;  Laterality: Left;  Local w/ MAC     Family History   Problem Relation Age of Onset    Diabetes Mother     Cataracts Mother     Diabetes Father     Cataracts Father     Coronary artery disease Brother     Diabetes Brother     Hypertension Sister     Diabetes Sister     No Known Problems Sister     Cancer Sister         lymphoma     Diabetes Brother     Hypotension Brother     Kidney failure Brother     Hypotension Brother     Amblyopia Neg Hx     Blindness Neg Hx     Glaucoma Neg Hx     Macular degeneration Neg Hx     Retinal detachment Neg Hx     Strabismus Neg Hx     Stroke Neg Hx     Thyroid disease Neg Hx      Social History     Tobacco Use    Smoking status: Never Smoker    Smokeless tobacco: Never Used   Substance Use Topics    Alcohol use: Not Currently     Comment: occasionally     Drug use: No     Review of Systems   Constitutional: Negative for chills and fever.   HENT: Negative for sore throat and trouble swallowing.         No anosmia   Respiratory: Negative for shortness of breath. Cough: "tickle"    Cardiovascular: Negative for chest pain and leg swelling.   Gastrointestinal: Negative for abdominal pain, nausea and vomiting.   Neurological: Negative for headaches.       Physical Exam     Initial Vitals [06/25/20 0650]   BP Pulse Resp Temp SpO2   (!) 149/90 75 18 99.4 °F (37.4 °C) 96 %      MAP       --         Physical Exam    Vitals reviewed.  Constitutional: She appears well-developed " and well-nourished. She is not diaphoretic. No distress.   HENT:   Head: Normocephalic and atraumatic.   Right Ear: External ear normal.   Left Ear: External ear normal.   Eyes: Conjunctivae and EOM are normal. Pupils are equal, round, and reactive to light.   Cardiovascular: Normal rate, regular rhythm and intact distal pulses.   Pulmonary/Chest: No respiratory distress. She has no wheezes. She has no rhonchi. She has no rales.   Frequent coughing on exam, non productive    No tachypnea, dyspnea nor increased work of breathing   Neurological: She is alert and oriented to person, place, and time.   Skin: Skin is warm and dry. Capillary refill takes less than 2 seconds.   Psychiatric: She has a normal mood and affect. Her behavior is normal. Judgment and thought content normal.         ED Course   Procedures  Labs Reviewed   SARS-COV-2 RNA AMPLIFICATION, QUAL - Abnormal; Notable for the following components:       Result Value    SARS-CoV-2 RNA, Amplification, Qual Positive (*)     All other components within normal limits          Imaging Results    None          Medical Decision Making:   History:   Old Medical Records: I decided to obtain old medical records.  Old Records Summarized: records from clinic visits.  Initial Assessment:   Patient presents for COVID testing need d/t positive test of wife. Denies symptoms thought actively coughing on exam, LTAB, VSS, afebrile  Differential Diagnosis:   Physical exam and history taking lower clinical suspicion for respiratory failure, PE, ACS, PNA.  Clinical Tests:   Lab Tests: Reviewed and Ordered  ED Management:  COVID test positive.     Discussed importance of 14-day quarantine and given additional information. Patient agreed to plan of care and voiced understanding. Discharged in stable condition with strict ED return precautions.    Erendira Davis PA-C  06/25/2020                                     Clinical Impression:       ICD-10-CM ICD-9-CM   1. COVID-19 virus  detected  U07.1          Disposition:   Disposition: Discharged  Condition: Stable     ED Disposition Condition    Discharge Stable        ED Prescriptions     None        Follow-up Information     Follow up With Specialties Details Why Contact Info    [facility] Emergency Medicine Go to  Return to the ER immediately, If symptoms worsen or new symptoms occur [address]  [facility] [address]  [phone]                                     Erendira Davis PA-C  06/25/20 0756

## 2020-06-26 ENCOUNTER — NURSE TRIAGE (OUTPATIENT)
Dept: ADMINISTRATIVE | Facility: CLINIC | Age: 55
End: 2020-06-26

## 2020-06-26 LAB
FINAL PATHOLOGIC DIAGNOSIS: NORMAL
GROSS: NORMAL

## 2020-06-26 NOTE — TELEPHONE ENCOUNTER
Patient populate to COVID-19 Home Monitoring que.   Complains of sore throat, nasal congested, body aches, headache, mild cough. Taking OTC Tylenol and  Vitamin C.  Advise patient on home care advice.  Patient verbalized understanding.  Will reach out for any new or worsening symptoms,     Reason for Disposition   [1] COVID-19 diagnosed by positive lab test AND [2] mild symptoms (e.g., cough, fever, others) AND [3] no complications or SOB    Additional Information   Negative: SEVERE difficulty breathing (e.g., struggling for each breath, speaks in single words)   Negative: Difficult to awaken or acting confused (e.g., disoriented, slurred speech)   Negative: Bluish (or gray) lips or face now   Negative: Shock suspected (e.g., cold/pale/clammy skin, too weak to stand, low BP, rapid pulse)   Negative: Sounds like a life-threatening emergency to the triager   Negative: SEVERE or constant chest pain or pressure (Exception: mild central chest pain, present only when coughing)   Negative: MODERATE difficulty breathing (e.g., speaks in phrases, SOB even at rest, pulse 100-120)   Negative: MILD difficulty breathing (e.g., minimal/no SOB at rest, SOB with walking, pulse <100)   Negative: Chest pain   Negative: Patient sounds very sick or weak to the triager   Negative: Fever > 103 F (39.4 C)   Negative: [1] Fever > 101 F (38.3 C) AND [2] age > 60   Negative: [1] Fever > 100.0 F (37.8 C) AND [2] bedridden (e.g., nursing home patient, CVA, chronic illness, recovering from surgery)   Negative: HIGH RISK patient (e.g., age > 64 years, diabetes, heart or lung disease, weak immune system)   Negative: [1] COVID-19 infection suspected by caller or triager AND [2] mild symptoms (cough, fever, or others) AND [3] no complications or SOB   Negative: Fever present > 3 days (72 hours)   Negative: [1] Fever returns after gone for over 24 hours AND [2] symptoms worse or not improved   Negative: [1] Continuous (nonstop)  coughing interferes with work or school AND [2] no improvement using cough treatment per protocol   Negative: Cough present > 3 weeks    Protocols used: CORONAVIRUS (COVID-19) DIAGNOSED OR [id number]

## 2020-06-30 ENCOUNTER — TELEPHONE (OUTPATIENT)
Dept: GASTROENTEROLOGY | Facility: CLINIC | Age: 55
End: 2020-06-30

## 2020-06-30 NOTE — TELEPHONE ENCOUNTER
----- Message from Jc Koo MD sent at 6/27/2020  4:30 PM CDT -----  Please call, the polyp was benign

## 2020-07-01 ENCOUNTER — NURSE TRIAGE (OUTPATIENT)
Dept: ADMINISTRATIVE | Facility: CLINIC | Age: 55
End: 2020-07-01

## 2020-07-01 ENCOUNTER — TELEPHONE (OUTPATIENT)
Dept: INTERNAL MEDICINE | Facility: CLINIC | Age: 55
End: 2020-07-01

## 2020-07-01 ENCOUNTER — HOSPITAL ENCOUNTER (EMERGENCY)
Facility: HOSPITAL | Age: 55
Discharge: HOME | End: 2020-07-01
Attending: EMERGENCY MEDICINE
Payer: MEDICARE

## 2020-07-01 VITALS
HEIGHT: 66 IN | HEART RATE: 70 BPM | BODY MASS INDEX: 38.57 KG/M2 | OXYGEN SATURATION: 98 % | TEMPERATURE: 99 F | DIASTOLIC BLOOD PRESSURE: 70 MMHG | WEIGHT: 240 LBS | RESPIRATION RATE: 18 BRPM | SYSTOLIC BLOOD PRESSURE: 137 MMHG

## 2020-07-01 DIAGNOSIS — U07.1 COVID-19 VIRUS INFECTION: Primary | ICD-10-CM

## 2020-07-01 DIAGNOSIS — Z20.822 SUSPECTED COVID-19 VIRUS INFECTION: ICD-10-CM

## 2020-07-01 LAB
ALBUMIN SERPL BCP-MCNC: 3.6 G/DL (ref 3.5–5.2)
ALP SERPL-CCNC: 54 U/L (ref 55–135)
ALT SERPL W/O P-5'-P-CCNC: 16 U/L (ref 10–44)
ANION GAP SERPL CALC-SCNC: 8 MMOL/L (ref 8–16)
AST SERPL-CCNC: 19 U/L (ref 10–40)
BASOPHILS # BLD AUTO: 0.01 K/UL (ref 0–0.2)
BASOPHILS NFR BLD: 0.3 % (ref 0–1.9)
BILIRUB SERPL-MCNC: 0.8 MG/DL (ref 0.1–1)
BUN SERPL-MCNC: 8 MG/DL (ref 6–20)
CALCIUM SERPL-MCNC: 9.4 MG/DL (ref 8.7–10.5)
CHLORIDE SERPL-SCNC: 105 MMOL/L (ref 95–110)
CK SERPL-CCNC: 81 U/L (ref 20–180)
CO2 SERPL-SCNC: 26 MMOL/L (ref 23–29)
CREAT SERPL-MCNC: 0.7 MG/DL (ref 0.5–1.4)
CRP SERPL-MCNC: 3.2 MG/L (ref 0–8.2)
DIFFERENTIAL METHOD: ABNORMAL
EOSINOPHIL # BLD AUTO: 0 K/UL (ref 0–0.5)
EOSINOPHIL NFR BLD: 0.9 % (ref 0–8)
ERYTHROCYTE [DISTWIDTH] IN BLOOD BY AUTOMATED COUNT: 13.9 % (ref 11.5–14.5)
EST. GFR  (AFRICAN AMERICAN): >60 ML/MIN/1.73 M^2
EST. GFR  (NON AFRICAN AMERICAN): >60 ML/MIN/1.73 M^2
FERRITIN SERPL-MCNC: 41 NG/ML (ref 20–300)
GLUCOSE SERPL-MCNC: 84 MG/DL (ref 70–110)
HCT VFR BLD AUTO: 40.9 % (ref 37–48.5)
HGB BLD-MCNC: 12.7 G/DL (ref 12–16)
IMM GRANULOCYTES # BLD AUTO: 0 K/UL (ref 0–0.04)
IMM GRANULOCYTES NFR BLD AUTO: 0 % (ref 0–0.5)
LACTATE SERPL-SCNC: 0.6 MMOL/L (ref 0.5–2.2)
LDH SERPL L TO P-CCNC: 200 U/L (ref 110–260)
LYMPHOCYTES # BLD AUTO: 1.3 K/UL (ref 1–4.8)
LYMPHOCYTES NFR BLD: 37.6 % (ref 18–48)
MCH RBC QN AUTO: 30.4 PG (ref 27–31)
MCHC RBC AUTO-ENTMCNC: 31.1 G/DL (ref 32–36)
MCV RBC AUTO: 98 FL (ref 82–98)
MONOCYTES # BLD AUTO: 0.4 K/UL (ref 0.3–1)
MONOCYTES NFR BLD: 11 % (ref 4–15)
NEUTROPHILS # BLD AUTO: 1.7 K/UL (ref 1.8–7.7)
NEUTROPHILS NFR BLD: 50.2 % (ref 38–73)
NRBC BLD-RTO: 0 /100 WBC
PLATELET # BLD AUTO: 209 K/UL (ref 150–350)
PMV BLD AUTO: 11.5 FL (ref 9.2–12.9)
POTASSIUM SERPL-SCNC: 4.2 MMOL/L (ref 3.5–5.1)
PROT SERPL-MCNC: 7.4 G/DL (ref 6–8.4)
RBC # BLD AUTO: 4.18 M/UL (ref 4–5.4)
SODIUM SERPL-SCNC: 139 MMOL/L (ref 136–145)
TROPONIN I SERPL DL<=0.01 NG/ML-MCNC: <0.006 NG/ML (ref 0–0.03)
WBC # BLD AUTO: 3.46 K/UL (ref 3.9–12.7)

## 2020-07-01 PROCEDURE — 99285 PR EMERGENCY DEPT VISIT,LEVEL V: ICD-10-PCS | Mod: ,,, | Performed by: EMERGENCY MEDICINE

## 2020-07-01 PROCEDURE — 93010 ELECTROCARDIOGRAM REPORT: CPT | Mod: ,,, | Performed by: INTERNAL MEDICINE

## 2020-07-01 PROCEDURE — 84484 ASSAY OF TROPONIN QUANT: CPT

## 2020-07-01 PROCEDURE — 83615 LACTATE (LD) (LDH) ENZYME: CPT

## 2020-07-01 PROCEDURE — 99285 EMERGENCY DEPT VISIT HI MDM: CPT | Mod: 25

## 2020-07-01 PROCEDURE — 93005 ELECTROCARDIOGRAM TRACING: CPT

## 2020-07-01 PROCEDURE — 83605 ASSAY OF LACTIC ACID: CPT

## 2020-07-01 PROCEDURE — 99285 EMERGENCY DEPT VISIT HI MDM: CPT | Mod: ,,, | Performed by: EMERGENCY MEDICINE

## 2020-07-01 PROCEDURE — 93010 EKG 12-LEAD: ICD-10-PCS | Mod: ,,, | Performed by: INTERNAL MEDICINE

## 2020-07-01 PROCEDURE — 86140 C-REACTIVE PROTEIN: CPT

## 2020-07-01 PROCEDURE — 82550 ASSAY OF CK (CPK): CPT

## 2020-07-01 PROCEDURE — 82728 ASSAY OF FERRITIN: CPT

## 2020-07-01 PROCEDURE — 80053 COMPREHEN METABOLIC PANEL: CPT

## 2020-07-01 PROCEDURE — 85025 COMPLETE CBC W/AUTO DIFF WBC: CPT

## 2020-07-01 NOTE — ED PROVIDER NOTES
Encounter Date: 7/1/2020       History     Chief Complaint   Patient presents with    COVID-19 Concerns     + increased OSB, + COVID on thurday. Denies CP, + fever and chills     54-year-old female past medical history of asthma, T2 DM, PADDY, hypertension, hyperlipidemia, presenting with shortness of breath x1 day.  Patient diagnosed known COVID positive 1 week ago, now with increasing shortness of breath. Worse with exertion.  Denies chest pain. Concerned because her wife who also has COVID is being admitted for pneumonia.        Review of patient's allergies indicates:   Allergen Reactions    Strawberry Anaphylaxis     Past Medical History:   Diagnosis Date    Allergy     Anemia     Asthma     Bilateral shoulder bursitis     Cervical stenosis of spine     Chronic pain     DDD (degenerative disc disease), cervical     Depression 1/28/2019    Diabetes mellitus type II     DJD (degenerative joint disease) of knee 6/19/2014    Facet arthritis of lumbar region 12/17/2015    Facet syndrome 12/17/2015    GERD (gastroesophageal reflux disease)     Heartburn     Hyperlipidemia     Hypertension     Morbid obesity     Neuromuscular disorder     PADDY (obstructive sleep apnea)     Proteinuria     Right carpal tunnel syndrome     Sacroiliitis 6/13/2018    Sacroiliitis     Sleep apnea      Past Surgical History:   Procedure Laterality Date    CARPAL TUNNEL RELEASE      CARPAL TUNNEL RELEASE  1980s    left    CARPAL TUNNEL RELEASE  2012    right    COLONOSCOPY N/A 6/15/2020    Procedure: COLONOSCOPY;  Surgeon: Jc Koo MD;  Location: [facility] (4TH FLR);  Service: Endoscopy;  Laterality: N/A;  [facility]  pt updated on drop off location and no visitor policy-    ESOPHAGOGASTRODUODENOSCOPY N/A 3/27/2019    Procedure: EGD (ESOPHAGOGASTRODUODENOSCOPY);  Surgeon: Robbin Bar MD;  Location: [facility] (2ND FLR);  Service: Endoscopy;  Laterality: N/A;  BMI  61.3/2nd floor case/svn    INJECTION OF JOINT Bilateral 6/9/2020    Procedure: INJECTION, JOINT, BILATERAL SI;  Surgeon: Lina Wagner MD;  Location: [facility]IN [facility]GT;  Service: Pain Management;  Laterality: Bilateral;  B/L SI Joint Injection    JOINT REPLACEMENT Bilateral     with 2 revisions on rt    KNEE SURGERY  3/2010    orthroscope    KNEE SURGERY  6-19-14    left TKR    LAPAROSCOPIC SLEEVE GASTRECTOMY N/A 8/28/2019    Procedure: GASTRECTOMY, SLEEVE, LAPAROSCOPIC with intraop EGD;  Surgeon: Heriberto Clements MD;  [facility];  Service: General;  Laterality: N/A;    RADIOFREQUENCY ABLATION Right 7/9/2019    Procedure: RADIOFREQUENCY ABLATION;  Surgeon: Lina Wagner MD;  Location: [facility];  Service: Pain Management;  Laterality: Right;  RIGHT RFA L2,3,4,5  2 of 2    RADIOFREQUENCY ABLATION Left 12/19/2019    Procedure: RADIOFREQUENCY ABLATION, LEFT L2-L3-L4-L5 MEDIAL BRANCH 1 OF 2;  Surgeon: Lina Wagner MD;  Location: [facility];  Service: Pain Management;  Laterality: Left;    RADIOFREQUENCY ABLATION Right 12/31/2019    Procedure: RADIOFREQUENCY ABLATION, RIGHT L2-L3-L4-L5  2 OF 2;  Surgeon: Lina Wagner MD;  Location: [facility];  Service: Pain Management;  Laterality: Right;    RADIOFREQUENCY ABLATION OF LUMBAR MEDIAL BRANCH NERVE AT SINGLE LEVEL Left 6/26/2018    Procedure: RADIOFREQUENCY ABLATION, NERVE, MEDIAL BRANCH, LUMBAR, 1 LEVEL;  Surgeon: Lina Wagner MD;  Location: [facility];  Service: Pain Management;  Laterality: Left;  Left Cooled RFA @ L2,3,4,5  [id number]  with Sedation    1 of 2    RADIOFREQUENCY ABLATION OF LUMBAR MEDIAL BRANCH NERVE AT SINGLE LEVEL Right 7/10/2018    Procedure: RADIOFREQUENCY ABLATION, NERVE, MEDIAL BRANCH, LUMBAR, 1 LEVEL;  Surgeon: Lina Wagner MD;  Location: [facility];  Service: Pain Management;  Laterality: Right;  RIght Cooled RFA @ L2,3,4,5  [id number] with Sedation    2 of 2     TRIGGER FINGER RELEASE Right 2017    TRIGGER FINGER RELEASE Left 7/29/2019    Procedure: RELEASE, TRIGGER FINGER, Left Thumb;  Surgeon: Velma Garcia MD;  Location: [facility];  Service: Orthopedics;  Laterality: Left;  Local w/ MAC     Family History   Problem Relation Age of Onset    Diabetes Mother     Cataracts Mother     Diabetes Father     Cataracts Father     Coronary artery disease Brother     Diabetes Brother     Hypertension Sister     Diabetes Sister     No Known Problems Sister     Cancer Sister         lymphoma     Diabetes Brother     Hypotension Brother     Kidney failure Brother     Hypotension Brother     Amblyopia Neg Hx     Blindness Neg Hx     Glaucoma Neg Hx     Macular degeneration Neg Hx     Retinal detachment Neg Hx     Strabismus Neg Hx     Stroke Neg Hx     Thyroid disease Neg Hx      Social History     Tobacco Use    Smoking status: Never Smoker    Smokeless tobacco: Never Used   Substance Use Topics    Alcohol use: Not Currently     Comment: occasionally     Drug use: No     Review of Systems   Constitutional: Positive for chills, fatigue and fever.   HENT: Negative for congestion, rhinorrhea and sore throat.    Eyes: Negative for visual disturbance.   Respiratory: Positive for cough, chest tightness and shortness of breath.    Cardiovascular: Negative for chest pain and palpitations.   Gastrointestinal: Negative for abdominal pain, diarrhea, nausea and vomiting.   Genitourinary: Negative for decreased urine volume, difficulty urinating, dysuria and flank pain.   Musculoskeletal: Negative for back pain, myalgias, neck pain and neck stiffness.   Skin: Negative for pallor and rash.   Allergic/Immunologic: Negative for immunocompromised state.   Neurological: Negative for dizziness, weakness, light-headedness and headaches.   Hematological: Does not bruise/bleed easily.   Psychiatric/Behavioral: Negative for confusion.       Physical Exam     Initial Vitals  [07/01/20 1220]   BP Pulse Resp Temp SpO2   136/84 72 16 98.1 °F (36.7 °C) 95 %      MAP       --         Physical Exam    Nursing note and vitals reviewed.  Constitutional: She appears well-developed and well-nourished. She is not diaphoretic. No distress.   HENT:   Head: Normocephalic and atraumatic.   Nose: Nose normal.   Eyes: Conjunctivae and EOM are normal. Pupils are equal, round, and reactive to light. No scleral icterus.   Neck: Normal range of motion. Neck supple.   Cardiovascular: Normal rate, regular rhythm and intact distal pulses.   No murmur heard.  Pulmonary/Chest: Breath sounds normal. No stridor. No respiratory distress. She has no wheezes. She has no rhonchi. She has no rales. She exhibits no tenderness.   No hypoxia   Abdominal: Soft. Bowel sounds are normal. She exhibits no distension. There is no abdominal tenderness.   Musculoskeletal: Normal range of motion. No tenderness or edema.   Lymphadenopathy:     She has no cervical adenopathy.   Neurological: She is alert and oriented to person, place, and time. She has normal strength.   Skin: Skin is warm and dry. Capillary refill takes less than 2 seconds. No pallor.   Psychiatric: She has a normal mood and affect. Her behavior is normal. Judgment and thought content normal.         ED Course   Procedures  Labs Reviewed   CBC W/ AUTO DIFFERENTIAL - Abnormal; Notable for the following components:       Result Value    WBC 3.46 (*)     Mean Corpuscular Hemoglobin Conc 31.1 (*)     Gran # (ANC) 1.7 (*)     All other components within normal limits   COMPREHENSIVE METABOLIC PANEL - Abnormal; Notable for the following components:    Alkaline Phosphatase 54 (*)     All other components within normal limits   C-REACTIVE PROTEIN   FERRITIN   LACTATE DEHYDROGENASE   CK   LACTIC ACID, PLASMA   TROPONIN I     EKG Readings: (Independently Interpreted)   Normal sinus rhythm, rate 61, normal intervals, no ischemic changes, no STEMI     ECG Results          EKG  12-lead (Final result)  Result time 07/01/20 18:34:12    Final result by Interface, Lab In [facility] (07/01/20 18:34:12)                 Narrative:    Test Reason : R68.89,    Vent. Rate : 061 BPM     Atrial Rate : 061 BPM     P-R Int : 138 ms          QRS Dur : 076 ms      QT Int : 384 ms       P-R-T Axes : 046 077 027 degrees     QTc Int : 386 ms    Normal sinus rhythm  Normal ECG  When compared with ECG of 21-AUG-2019 12:55,  No significant change was found  Confirmed by Marc Baumann MD ([id number]) on 7/1/2020 6:33:58 PM    Referred By: [email or web address]   SELF           Confirmed By:Marc Baumann MD                            Imaging Results          X-Ray Chest AP Portable (Final result)  Result time 07/01/20 14:02:20    Final result by Teto Hart MD (07/01/20 14:02:20)                 Impression:      See above      Electronically signed by: Teto Hart MD  Date:    07/01/2020  Time:    14:02             Narrative:    EXAMINATION:  XR CHEST AP PORTABLE    CLINICAL HISTORY:  Suspected Covid-19 Virus Infection;    TECHNIQUE:  Single frontal view of the chest was performed.    COMPARISON:  None 01/23/2019    FINDINGS:  Heart size normal.  No significant airspace consolidation or pleural effusion identified.  Minimal fibrotic and/or subsegmental atelectatic changes noted at the left lung base as before                                 Medical Decision Making:   History:   Old Medical Records: I decided to obtain old medical records.  Initial Assessment:   Patient in no respiratory distress, vital signs stable, clear breath sounds  Differential Diagnosis:   COVID, PNA, URI, ACS, I have considered but do not suspect PE given stable VS  Independently Interpreted Test(s):   I have ordered and independently interpreted X-rays - see summary below.       <> Summary of X-Ray Reading(s): No PNA or effusion  I have ordered and independently interpreted EKG Reading(s) - see summary below       <> Summary of EKG Reading(s): NSR, no  STEMI  Clinical Tests:   Lab Tests: Ordered and Reviewed  Radiological Study: Ordered and Reviewed  Medical Tests: Ordered and Reviewed  ED Management:  Chest x-ray negative for pneumonia, patient tolerated ambulatory pulse ox trial well without hypoxia or dyspnea, stable for discharge home with strict return precautions.  She states that she does have O2 monitor at home.                                  Clinical Impression:       ICD-10-CM ICD-9-CM   1. COVID-19 virus infection  U07.1    2. Suspected Covid-19 Virus Infection  R68.89              ED Disposition Condition    Discharge Stable        ED Prescriptions     None        Follow-up Information     Follow up With Specialties Details Why Contact Info    Clarisse Richardson MD Internal Medicine Schedule an appointment as soon as possible for a visit   [address]  [place] [address]  [phone]      [facility] Emergency Medicine Go to  If symptoms worsen [address] [facility] [id number] [phone]                                     Negra Parker MD  07/03/20 2651

## 2020-07-01 NOTE — DISCHARGE INSTRUCTIONS
You have known COVID infection.  Please self quarantine   Your labs and chest x-ray were negative today, however if you have worsening shortness of breath, chest pain, or other concerning symptoms, please return to the emergency department immediately.

## 2020-07-01 NOTE — ED TRIAGE NOTES
Alivia Thomas, a 54 y.o. female presents to the ED via personal transportation with CC HA, no smell or taste, SOB and SpO2 90% at home, reports diagnosed with COVID Thursday, symptoms progressively worsening. Reports low grade temp 99 at home.    Patient identifiers verified verbally with patient and correct for Alivia Thomas.    SKIN: Skin is warm dry and intact, and color is consistent with ethnicity. Capillary refill <3 seconds. No breakdown or brusing visible. Mucus membranes moist, acyanotic.  RESPIRATORY: Airway is open and patent. Respirations-spontaneous, unlabored, regular rate, equal bilaterally on inspiration and expiration. No accessory muscle use noted. Lungs clear to auscultation in all fields bilaterally anterior and posterior.   CARDIAC: Patient has regular heart rate.  No peripheral edema noted, and patient has no c/o chest pain. Peripheral pulses present equal and strong throughout.  ABDOMEN: Soft and non-tender to palpation with no distention noted. Denies N/V/D.  NEUROLOGIC: Eyes open spontaneously and facial expression symmetrical. Pt behavior appropriate to situation, and pt follows commands. Pt reports sensation present in all extremities when touched with a finger, denies any numbness or tingling. PERRLA  MUSCULOSKELETAL: Spontaneous movement noted to all extremities. Hand  equal and leg strength strong +5 bilaterally.   : No complaints of frequency, burning, urgency or blood in the urine. No complaints of incontinence.

## 2020-07-01 NOTE — TELEPHONE ENCOUNTER
"Covid-19 symptom tracker call back, patient reports no new symptoms but c/o "just not feeling well." Patient sounds very lethargic. Answers appropriately but with delayed response. Care advice given to go to ED or see PCP today, patient states "I think I will wait one more day." Triage nurse reiterated care advice then patient agreed to speak with PCP by phone. Unable to get in touch with the staff at this time. Left message with . Also routed message to office for follow up.     Reason for Disposition   Patient sounds very sick or weak to the triager    Additional Information   Negative: SEVERE difficulty breathing (e.g., struggling for each breath, speaks in single words)   Negative: Difficult to awaken or acting confused (e.g., disoriented, slurred speech)   Negative: Bluish (or gray) lips or face now   Negative: Shock suspected (e.g., cold/pale/clammy skin, too weak to stand, low BP, rapid pulse)   Negative: Sounds like a life-threatening emergency to the triager   Negative: [1] COVID-19 exposure AND [2] NO symptoms   Negative: COVID-19 and Breastfeeding, questions about   Negative: [1] Adult with possible COVID-19 symptoms AND [2] triager concerned about severity of symptoms or other causes   Negative: SEVERE or constant chest pain or pressure (Exception: mild central chest pain, present only when coughing)   Negative: MODERATE difficulty breathing (e.g., speaks in phrases, SOB even at rest, pulse 100-120)   Negative: MILD difficulty breathing (e.g., minimal/no SOB at rest, SOB with walking, pulse <100)   Negative: Chest pain    Protocols used: CORONAVIRUS (COVID-19) DIAGNOSED OR [id number]      "

## 2020-07-02 ENCOUNTER — TELEPHONE (OUTPATIENT)
Dept: INTERNAL MEDICINE | Facility: CLINIC | Age: 55
End: 2020-07-02

## 2020-07-10 ENCOUNTER — HOSPITAL ENCOUNTER (EMERGENCY)
Facility: HOSPITAL | Age: 55
Discharge: HOME | End: 2020-07-10
Attending: EMERGENCY MEDICINE
Payer: MEDICARE

## 2020-07-10 VITALS
HEIGHT: 65 IN | HEART RATE: 101 BPM | OXYGEN SATURATION: 96 % | SYSTOLIC BLOOD PRESSURE: 106 MMHG | TEMPERATURE: 99 F | WEIGHT: 249.13 LBS | RESPIRATION RATE: 17 BRPM | DIASTOLIC BLOOD PRESSURE: 62 MMHG | BODY MASS INDEX: 41.51 KG/M2

## 2020-07-10 DIAGNOSIS — Z01.89 ENCOUNTER FOR LABORATORY EXAMINATION: Primary | ICD-10-CM

## 2020-07-10 PROCEDURE — 99281 EMR DPT VST MAYX REQ PHY/QHP: CPT

## 2020-07-10 PROCEDURE — 99284 PR EMERGENCY DEPT VISIT,LEVEL IV: ICD-10-PCS | Mod: ,,, | Performed by: PHYSICIAN ASSISTANT

## 2020-07-10 PROCEDURE — 99284 EMERGENCY DEPT VISIT MOD MDM: CPT | Mod: ,,, | Performed by: PHYSICIAN ASSISTANT

## 2020-07-10 NOTE — ED NOTES
Alivia Thomas, a 54 y.o. female presents to the ED w/ complaint of testing positive 2 weeks ago, quarantining, and asymptomatic at this time wishing to be retested.    Triage note:  Chief Complaint   Patient presents with    COVID-19 Concerns     COVID + 2 weeks ago. Here to be retested     Review of patient's allergies indicates:   Allergen Reactions    Strawberry Anaphylaxis     Past Medical History:   Diagnosis Date    Allergy     Anemia     Asthma     Bilateral shoulder bursitis     Cervical stenosis of spine     Chronic pain     DDD (degenerative disc disease), cervical     Depression 1/28/2019    Diabetes mellitus type II     DJD (degenerative joint disease) of knee 6/19/2014    Facet arthritis of lumbar region 12/17/2015    Facet syndrome 12/17/2015    GERD (gastroesophageal reflux disease)     Heartburn     Hyperlipidemia     Hypertension     Morbid obesity     Neuromuscular disorder     PADDY (obstructive sleep apnea)     Proteinuria     Right carpal tunnel syndrome     Sacroiliitis 6/13/2018    Sacroiliitis     Sleep apnea      Adult Physical Assessment  LOC: Alivia Thomas, 54 y.o. female verified via two identifiers.  The patient is awake, alert, oriented and speaking appropriately at this time.  APPEARANCE: Patient resting comfortably and appears to be in no acute distress at this time. Patient is clean and well groomed, patient's clothing is properly fastened.  SKIN:The skin is warm and dry, color consistent with ethnicity, patient has normal skin turgor and moist mucus membranes, skin intact, no breakdown or brusing noted.  MUSCULOSKELETAL: Patient moving all extremities well, no obvious swelling or deformities noted.  RESPIRATORY: Airway is open and patent, respirations are spontaneous, patient has a normal effort and rate, no accessory muscle use noted.  CARDIAC: Patient has a normal rate and rhythm, no periphreal edema noted in any extremity, capillary refill < 3  seconds in all extremities  ABDOMEN: Soft and non tender to palpation, no abdominal distention noted. Bowel sounds present in all four quadrants.  NEUROLOGIC: Eyes open spontaneously, behavior appropriate to situation, follows commands, facial expression symmetrical, bilateral hand grasp equal and even, purposeful motor response noted, normal sensation in all extremities when touched with a finger.

## 2020-07-10 NOTE — DISCHARGE INSTRUCTIONS
The CDC is currently recommending that it is safe to return to your normal activities after a 2 week quarantine as long as you have been fever free for 3 days.  Week are not retesting any patients in the emergency room due to a shortage of testing supplies.  If you would still like to be tested, please go to the web site below to look for a testing site that is convenient for you.    [email or web address]    If you start to have shortness of breath, chest pain or other concerning symptoms please return to the emergency department.

## 2020-07-10 NOTE — Clinical Note
"Alivia "Ab Thomas was seen and treated in our emergency department on 7/10/2020.     COVID-19 is present in our communities across the state. There is limited testing for COVID at this time, so not all patients can be tested. In this situation, your employee meets the following criteria:    Alivia Thomas has not been tested for COVID-19. She can return to work once they are asymptomatic for 72 hours without the use of fever reducing medications (Tylenol, Motrin, etc). Infection control policies of the employer should be followed, as well as good hand hygiene.      If you have any questions or concerns, or if I can be of further assistance, please do not hesitate to contact me.    Sincerely,             Giulia Foley PA-C"

## 2020-07-10 NOTE — ED PROVIDER NOTES
Encounter Date: 7/10/2020       History     Chief Complaint   Patient presents with    COVID-19 Concerns     COVID + 2 weeks ago. Here to be retested     54-year-old female with multiple comorbidities including asthma, anemia, degenerative disc disease and diabetes presents requesting retesting of COVID-19.  The patient had was initially sick 2 weeks ago and tested positive on 06/25/2020.  She reports that she had chills, fatigue, fever, chest pain and shortness of breath but all of these have since resolved.  She is requesting we testing because she wants to make sure that she is now free from the virus.        Review of patient's allergies indicates:   Allergen Reactions    Strawberry Anaphylaxis     Past Medical History:   Diagnosis Date    Allergy     Anemia     Asthma     Bilateral shoulder bursitis     Cervical stenosis of spine     Chronic pain     DDD (degenerative disc disease), cervical     Depression 1/28/2019    Diabetes mellitus type II     DJD (degenerative joint disease) of knee 6/19/2014    Facet arthritis of lumbar region 12/17/2015    Facet syndrome 12/17/2015    GERD (gastroesophageal reflux disease)     Heartburn     Hyperlipidemia     Hypertension     Morbid obesity     Neuromuscular disorder     PADDY (obstructive sleep apnea)     Proteinuria     Right carpal tunnel syndrome     Sacroiliitis 6/13/2018    Sacroiliitis     Sleep apnea      Past Surgical History:   Procedure Laterality Date    CARPAL TUNNEL RELEASE      CARPAL TUNNEL RELEASE  1980s    left    CARPAL TUNNEL RELEASE  2012    right    COLONOSCOPY N/A 6/15/2020    Procedure: COLONOSCOPY;  Surgeon: Jc Koo MD;  Location: [address];  Service: Endoscopy;  Laterality: N/A;  COVID screening on 6/13/20 at [facility]  pt updated on drop off location and no visitor policy-    ESOPHAGOGASTRODUODENOSCOPY N/A 3/27/2019    Procedure: EGD (ESOPHAGOGASTRODUODENOSCOPY);  Surgeon: Robbin Bar,  MD;  Location: [facility] ENDO (2ND FLR);  Service: Endoscopy;  Laterality: N/A;  BMI 61.3/2nd floor case/svn    INJECTION OF JOINT Bilateral 6/9/2020    Procedure: INJECTION, JOINT, BILATERAL SI;  Surgeon: Lina Wagner MD;  Location: [facility] PAIN MGT;  Service: Pain Management;  Laterality: Bilateral;  B/L SI Joint Injection    JOINT REPLACEMENT Bilateral     with 2 revisions on rt    KNEE SURGERY  3/2010    orthroscope    KNEE SURGERY  6-19-14    left TKR    LAPAROSCOPIC SLEEVE GASTRECTOMY N/A 8/28/2019    Procedure: GASTRECTOMY, SLEEVE, LAPAROSCOPIC with intraop EGD;  Surgeon: Heriberto Clements MD;  Location: [facility] OR 2ND FLR;  Service: General;  Laterality: N/A;    RADIOFREQUENCY ABLATION Right 7/9/2019    Procedure: RADIOFREQUENCY ABLATION;  Surgeon: Lina Wagner MD;  Location: [facility] PAIN MGT;  Service: Pain Management;  Laterality: Right;  RIGHT RFA L2,3,4,5  2 of 2    RADIOFREQUENCY ABLATION Left 12/19/2019    Procedure: RADIOFREQUENCY ABLATION, LEFT L2-L3-L4-L5 MEDIAL BRANCH 1 OF 2;  Surgeon: Lina Wagner MD;  Location: [facility] PAIN MGT;  Service: Pain Management;  Laterality: Left;    RADIOFREQUENCY ABLATION Right 12/31/2019    Procedure: RADIOFREQUENCY ABLATION, RIGHT L2-L3-L4-L5  2 OF 2;  Surgeon: Lina Wagner MD;  Location: [facility] PAIN MGT;  Service: Pain Management;  Laterality: Right;    RADIOFREQUENCY ABLATION OF LUMBAR MEDIAL BRANCH NERVE AT SINGLE LEVEL Left 6/26/2018    Procedure: RADIOFREQUENCY ABLATION, NERVE, MEDIAL BRANCH, LUMBAR, 1 LEVEL;  Surgeon: Lina Wagner MD;  Location: [facility] PAIN MGT;  Service: Pain Management;  Laterality: Left;  Left Cooled RFA @ L2,3,4,5  [id number]  with Sedation    1 of 2    RADIOFREQUENCY ABLATION OF LUMBAR MEDIAL BRANCH NERVE AT SINGLE LEVEL Right 7/10/2018    Procedure: RADIOFREQUENCY ABLATION, NERVE, MEDIAL BRANCH, LUMBAR, 1 LEVEL;  Surgeon: Lina Wagner MD;  Location: [facility] PAIN MGT;  Service: Pain Management;  Laterality:  Right;  RIght Cooled RFA @ L2,3,4,5  [id number] with Sedation    2 of 2    TRIGGER FINGER RELEASE Right 2017    TRIGGER FINGER RELEASE Left 7/29/2019    Procedure: RELEASE, TRIGGER FINGER, Left Thumb;  Surgeon: Velma Garcia MD;  Location: [facility];  Service: Orthopedics;  Laterality: Left;  Local w/ MAC     Family History   Problem Relation Age of Onset    Diabetes Mother     Cataracts Mother     Diabetes Father     Cataracts Father     Coronary artery disease Brother     Diabetes Brother     Hypertension Sister     Diabetes Sister     No Known Problems Sister     Cancer Sister         lymphoma     Diabetes Brother     Hypotension Brother     Kidney failure Brother     Hypotension Brother     Amblyopia Neg Hx     Blindness Neg Hx     Glaucoma Neg Hx     Macular degeneration Neg Hx     Retinal detachment Neg Hx     Strabismus Neg Hx     Stroke Neg Hx     Thyroid disease Neg Hx      Social History     Tobacco Use    Smoking status: Never Smoker    Smokeless tobacco: Never Used   Substance Use Topics    Alcohol use: Not Currently     Comment: occasionally     Drug use: No     Review of Systems   Constitutional: Negative for fever.   HENT: Negative for sore throat.    Respiratory: Negative for shortness of breath.    Cardiovascular: Negative for chest pain.   Gastrointestinal: Negative for nausea.   Genitourinary: Negative for dysuria.   Musculoskeletal: Negative for back pain.   Skin: Negative for rash.   Neurological: Negative for weakness.   Hematological: Does not bruise/bleed easily.       Physical Exam     Initial Vitals [07/10/20 1449]   BP Pulse Resp Temp SpO2   106/62 101 17 98.6 °F (37 °C) 96 %      MAP       --         Physical Exam    Nursing note and vitals reviewed.  Constitutional: She appears well-developed and well-nourished.   HENT:   Head: Normocephalic and atraumatic.   Eyes: EOM are normal.   Neck: Normal range of motion. Neck supple.   Cardiovascular: Normal  rate, regular rhythm, normal heart sounds and intact distal pulses. Exam reveals no gallop and no friction rub.    No murmur heard.  Pulmonary/Chest: Breath sounds normal. No respiratory distress. She has no wheezes. She has no rhonchi. She has no rales. She exhibits no tenderness.   Speaking full sentences without any difficulty   Musculoskeletal: Normal range of motion.   Neurological: She is alert and oriented to person, place, and time.   Skin: Skin is warm and dry.   Psychiatric: She has a normal mood and affect.         ED Course   Procedures  Labs Reviewed - No data to display       Imaging Results    None          Medical Decision Making:   History:   Old Medical Records: I decided to obtain old medical records.  Old Records Summarized: records from previous admission(s).       <> Summary of Records: Patient seen in this ED on 07/01/2020 for chest pain shortness of breath after her COVID diagnosis.  He tested positive on 06/25/2020  Initial Assessment:   54-year-old female with recent COVID-19 infection returns requesting retesting.  Her vitals are normal and she is well-appearing.  ED Management:  I explained to the patient that we are not currently retesting any patients for COVID-19.  The current recommendation from the Center for Disease Control to return to normal activities after a 10-14 day quarantine as long as she is feeling well and have been fever free for 3 days.  The patient expressed concern about this, and feels that she needs to be retested to confirm that she is no longer contagious.  I counseled her that many patients continue to have a positive test for weeks to months even though they are no longer infectious.  We currently have a shortage of testing supplies, therefore will provide information for community testing sites where she can be retested.  Work note provided. Stressed the importance of follow-up, strict ED return precautions given.  Patient voiced understanding and is  comfortable with discharge.                                         Clinical Impression:       ICD-10-CM ICD-9-CM   1. Encounter for laboratory examination  Z01.89 V72.60         Disposition:   Disposition: Discharged  Condition: Stable     ED Disposition Condition    Discharge Stable        ED Prescriptions     None        Follow-up Information     Follow up With Specialties Details Why Contact Info    Clarisse Richardson MD Internal Medicine Schedule an appointment as soon as possible for a visit in 1 week  [address]  [place] [address]  [phone]      [facility] Emergency Medicine Go to  If symptoms worsen [address] [facility] [address]  [phone]                                     Giulia Foley PA-C  07/10/20 1523

## 2020-07-10 NOTE — Clinical Note
Alivia Thomas was seen and treated in our emergency department on 7/10/2020.  She may return to work on 07/10/2020.  We are not currently retesting any patients for COVID-19.  The current recommendation from the center for disease control to return to normal activities after a 10-14 day quarantine as long as you are feeling well and have been fever free for 3 days.             If you have any questions or concerns, please don't hesitate to call.      Giulia Foley PA-C

## 2020-07-13 DIAGNOSIS — Z96.651 STATUS POST TOTAL RIGHT KNEE REPLACEMENT: ICD-10-CM

## 2020-07-13 DIAGNOSIS — M25.562 BILATERAL CHRONIC KNEE PAIN: ICD-10-CM

## 2020-07-13 DIAGNOSIS — G89.29 BILATERAL CHRONIC KNEE PAIN: ICD-10-CM

## 2020-07-13 DIAGNOSIS — Z79.891 ENCOUNTER FOR LONG-TERM OPIATE ANALGESIC USE: ICD-10-CM

## 2020-07-13 DIAGNOSIS — M47.816 FACET ARTHRITIS OF LUMBAR REGION: Primary | ICD-10-CM

## 2020-07-13 DIAGNOSIS — M47.816 SPONDYLOSIS OF LUMBAR REGION WITHOUT MYELOPATHY OR RADICULOPATHY: ICD-10-CM

## 2020-07-13 DIAGNOSIS — M51.36 DDD (DEGENERATIVE DISC DISEASE), LUMBAR: ICD-10-CM

## 2020-07-13 DIAGNOSIS — G89.4 CHRONIC PAIN DISORDER: ICD-10-CM

## 2020-07-13 DIAGNOSIS — M25.561 BILATERAL CHRONIC KNEE PAIN: ICD-10-CM

## 2020-07-13 DIAGNOSIS — M17.0 PRIMARY OSTEOARTHRITIS OF BOTH KNEES: ICD-10-CM

## 2020-07-13 RX ORDER — OXYCODONE AND ACETAMINOPHEN 10; 325 MG/1; MG/1
1 TABLET ORAL EVERY 8 HOURS PRN
Qty: 90 TABLET | Refills: 0 | Status: SHIPPED | OUTPATIENT
Start: 2020-07-13 | End: 2020-08-12 | Stop reason: SDUPTHER

## 2020-07-13 NOTE — TELEPHONE ENCOUNTER
Patient completed video visit 06/24/20 with . This is a  patient. Patient last received this medication 06/12/20. Patient has no UDS on file. Patient has a pain contract on file. Patient has a follow up scheduled 09/24/20.

## 2020-07-13 NOTE — TELEPHONE ENCOUNTER
----- Message from Nathaly Roca sent at 7/13/2020  8:36 AM CDT -----  Can the clinic reply in [facility]: no      Please refill the medication(s) listed below. The patient can be reached at this phone number (_[phone]_) once it is called into the pharmacy.      Medication #1oxyCODONE-acetaminophen (PERCOCET)  mg per tablet     Preferred Pharmacy:Pharmacy    [facility] [address]

## 2020-07-15 ENCOUNTER — OFFICE VISIT (OUTPATIENT)
Dept: OPTOMETRY | Facility: CLINIC | Age: 55
End: 2020-07-15
Payer: MEDICARE

## 2020-07-15 DIAGNOSIS — H52.4 MYOPIA WITH ASTIGMATISM AND PRESBYOPIA, BILATERAL: ICD-10-CM

## 2020-07-15 DIAGNOSIS — H43.812 POSTERIOR VITREOUS DETACHMENT OF LEFT EYE: ICD-10-CM

## 2020-07-15 DIAGNOSIS — E11.9 TYPE 2 DIABETES MELLITUS WITHOUT RETINOPATHY: Primary | ICD-10-CM

## 2020-07-15 DIAGNOSIS — H52.13 MYOPIA WITH ASTIGMATISM AND PRESBYOPIA, BILATERAL: ICD-10-CM

## 2020-07-15 DIAGNOSIS — H52.203 MYOPIA WITH ASTIGMATISM AND PRESBYOPIA, BILATERAL: ICD-10-CM

## 2020-07-15 DIAGNOSIS — Z71.89 COMPLEX CARE COORDINATION: ICD-10-CM

## 2020-07-15 DIAGNOSIS — H25.13 NUCLEAR SCLEROSIS, BILATERAL: ICD-10-CM

## 2020-07-15 PROCEDURE — 92014 PR EYE EXAM, EST PATIENT,COMPREHESV: ICD-10-PCS | Mod: S$PBB,,, | Performed by: OPTOMETRIST

## 2020-07-15 PROCEDURE — 92015 PR REFRACTION: ICD-10-PCS | Mod: ,,, | Performed by: OPTOMETRIST

## 2020-07-15 PROCEDURE — 99213 OFFICE O/P EST LOW 20 MIN: CPT | Mod: PBBFAC,PO | Performed by: OPTOMETRIST

## 2020-07-15 PROCEDURE — 92014 COMPRE OPH EXAM EST PT 1/>: CPT | Mod: S$PBB,,, | Performed by: OPTOMETRIST

## 2020-07-15 PROCEDURE — 92015 DETERMINE REFRACTIVE STATE: CPT | Mod: ,,, | Performed by: OPTOMETRIST

## 2020-07-15 PROCEDURE — 99999 PR PBB SHADOW E&M-EST. PATIENT-LVL III: CPT | Mod: PBBFAC,,, | Performed by: OPTOMETRIST

## 2020-07-15 PROCEDURE — 99999 PR PBB SHADOW E&M-EST. PATIENT-LVL III: ICD-10-PCS | Mod: PBBFAC,,, | Performed by: OPTOMETRIST

## 2020-07-15 NOTE — PROGRESS NOTES
HPI     FRANCY 02/2019  Diabetic  Doesn't monitor BS at home.  Patient has noticed   black spots in vision OD for the past several months.  Glasses about 6   months old and are distance only.  Patient hasn't noticed any vision   changes.  Not using any drops.     Hemoglobin A1C       Date                     Value               Ref Range             Status                04/27/2020               6.4 (H)             4.0 - 5.6 %           Final                 01/02/2020               6.6 (H)             4.0 - 5.6 %           Final          09/20/2019               6.4 (H)             4.0 - 5.6 %           Final                  Last edited by Jody Saavedra on 7/15/2020  8:20 AM. (History)            Assessment /Plan     For exam results, see Encounter Report.    Type 2 diabetes mellitus without retinopathy - Both Eyes    Nuclear sclerosis, bilateral    Posterior vitreous detachment of left eye    Myopia with astigmatism and presbyopia, bilateral      1. No diabetic retinopathy, no csme. Return in 1 year for dilated eye exam.  2.  Educated pt on presence of cataracts and effects on vision. No surgery at this time. Recheck in one year.  3. OU now, Monitor condition. Patient to report any changes. RTC 1 year recheck.  4. New Spec Rx given. Different lens options discussed with patient. RTC 1 year full exam.

## 2020-07-30 ENCOUNTER — PATIENT OUTREACH (OUTPATIENT)
Dept: EMERGENCY MEDICINE | Facility: HOSPITAL | Age: 55
End: 2020-07-30

## 2020-08-12 ENCOUNTER — TELEPHONE (OUTPATIENT)
Dept: PODIATRY | Facility: CLINIC | Age: 55
End: 2020-08-12

## 2020-08-13 ENCOUNTER — OFFICE VISIT (OUTPATIENT)
Dept: PODIATRY | Facility: CLINIC | Age: 55
End: 2020-08-13
Payer: MEDICARE

## 2020-08-13 VITALS
SYSTOLIC BLOOD PRESSURE: 116 MMHG | TEMPERATURE: 98 F | HEIGHT: 65 IN | WEIGHT: 249 LBS | BODY MASS INDEX: 41.48 KG/M2 | HEART RATE: 81 BPM | DIASTOLIC BLOOD PRESSURE: 61 MMHG

## 2020-08-13 DIAGNOSIS — E11.40 TYPE 2 DIABETES MELLITUS WITH DIABETIC NEUROPATHY, UNSPECIFIED WHETHER LONG TERM INSULIN USE: ICD-10-CM

## 2020-08-13 DIAGNOSIS — B35.1 DERMATOPHYTOSIS OF NAIL: Primary | ICD-10-CM

## 2020-08-13 PROCEDURE — 17999 UNLISTD PX SKN MUC MEMB SUBQ: CPT | Mod: CSM,,, | Performed by: PODIATRIST

## 2020-08-13 PROCEDURE — 99999 PR PBB SHADOW E&M-EST. PATIENT-LVL IV: ICD-10-PCS | Mod: PBBFAC,,, | Performed by: PODIATRIST

## 2020-08-13 PROCEDURE — 99214 OFFICE O/P EST MOD 30 MIN: CPT | Mod: PBBFAC,PN | Performed by: PODIATRIST

## 2020-08-13 PROCEDURE — 99999 PR PBB SHADOW E&M-EST. PATIENT-LVL IV: CPT | Mod: PBBFAC,,, | Performed by: PODIATRIST

## 2020-08-13 PROCEDURE — 17999 PR NON-COVERED FOOT CARE: ICD-10-PCS | Mod: CSM,,, | Performed by: PODIATRIST

## 2020-08-13 PROCEDURE — 99499 UNLISTED E&M SERVICE: CPT | Mod: ,,, | Performed by: PODIATRIST

## 2020-08-13 PROCEDURE — 99499 NO LOS: ICD-10-PCS | Mod: ,,, | Performed by: PODIATRIST

## 2020-08-13 NOTE — PROGRESS NOTES
"  Chief Complaint   Patient presents with    Diabetes Mellitus     PCP: Clarisse Richardson / Nov.2020  A1C: 04/27/2020 / 6.4    Nail Care     PROC B           HPI:   The patient is a 54 y.o.  female  who presents for a diabetic foot exam.   Patient reports + presence of abnormal sensation to the feet, just sometimes, mostly at night.   Patient has no history of wounds on the feet.   Hx of foot surgery: none.   Shoe gear: casual shoes   This patient has documented high risk feet requiring routine maintenance secondary to diabetes.    Main concern today is need for toenail trimming. Routine trimming helps.  Foot exam due 5/2020            Primary care doctor is: Clarisse Richardson MD  Patient last saw primary care doctor on:    Chief Complaint   Patient presents with    Diabetes Mellitus     PCP: Clarisse Richardson / Nov.2020  A1C: 04/27/2020 / 6.4    Nail Care     PROC B           Vitals:    08/13/20 1057   BP: 116/61   Pulse: 81   Temp: 98 °F (36.7 °C)   Weight: 112.9 kg (249 lb)   Height: 5' 5" (1.651 m)   PainSc: 0-No pain             Past Medical History:   Diagnosis Date    Allergy     Anemia     Asthma     Bilateral shoulder bursitis     Cervical stenosis of spine     Chronic pain     DDD (degenerative disc disease), cervical     Depression 1/28/2019    Diabetes mellitus type II     DJD (degenerative joint disease) of knee 6/19/2014    Facet arthritis of lumbar region 12/17/2015    Facet syndrome 12/17/2015    GERD (gastroesophageal reflux disease)     Heartburn     Hyperlipidemia     Hypertension     Morbid obesity     Neuromuscular disorder     PADDY (obstructive sleep apnea)     Proteinuria     Right carpal tunnel syndrome     Sacroiliitis 6/13/2018    Sacroiliitis     Sleep apnea          Current Outpatient Medications on File Prior to Visit   Medication Sig Dispense Refill    albuterol (VENTOLIN HFA) 90 mcg/actuation inhaler INHALE TWO PUFFS INTO LUNGS EVERY 4 TO 6 HOURS AS " NEEDED FOR SHORTNESS OF BREATH AND FOR WHEEZING 18 each 0    allopurinoL (ZYLOPRIM) 300 MG tablet Take 1 tablet (300 mg total) by mouth 2 (two) times daily. 180 tablet 3    atorvastatin (LIPITOR) 20 MG tablet Take 1 tablet (20 mg total) by mouth once daily. 90 tablet 3    b complex vitamins tablet Take 1 tablet by mouth every other day.       calcium citrate/vitamin D2 (ISIAH-CITRATE ORAL) Take 500 mg by mouth 2 (two) times daily.      cyanocobalamin (VITAMIN B-12) 1000 MCG tablet Take 100 mcg by mouth every morning.      FLUoxetine (PROZAC) 20 mg/5 mL (4 mg/mL) solution Take 10 mLs (40 mg total) by mouth once daily. 300 mL 6    fluticasone (FLONASE) 50 mcg/actuation nasal spray 1 spray by Each Nare route 2 (two) times daily as needed for Rhinitis. 15 g 0    gabapentin (NEURONTIN) 300 MG capsule Take 1 pill QHS for 7 days then take 1 pill BID for 7 days then take pill TID 90 capsule 1    levoFLOXacin (LEVAQUIN) 250 mg/10 mL Soln Take 20 mLs (500 mg total) by mouth once daily. 200 mL 0    levoFLOXacin (LEVAQUIN) 500 MG tablet Take 1 tablet (500 mg total) by mouth once daily. 10 tablet 0    MULTIVIT-IRON-MIN-FOLIC ACID 3,500-18-0.4 UNIT-MG-MG ORAL CHEW Take 1 tablet by mouth 2 (two) times daily.       omeprazole (PRILOSEC) 20 MG capsule TAKE 1 CAPSULE (20 MG TOTAL) BY MOUTH EVERY MORNING. 90 capsule 3    ondansetron (ZOFRAN) 4 MG tablet Take 1 tablet (4 mg total) by mouth every 6 (six) hours as needed. 20 tablet 0    oxyCODONE-acetaminophen (PERCOCET)  mg per tablet Take 1 tablet by mouth every 8 (eight) hours as needed for Pain. Greater than 7 day quantity medically necessary 90 tablet 0    vitamin D 185 MG Tab Take 5,000 mg by mouth every morning.        No current facility-administered medications on file prior to visit.        Review of patient's allergies indicates:  No Known Allergies          Social History     Socioeconomic History    Marital status:      Spouse name: Not on file     Number of children: Not on file    Years of education: Not on file    Highest education level: Not on file   Occupational History    Not on file   Social Needs    Financial resource strain: Not on file    Food insecurity     Worry: Not on file     Inability: Not on file    Transportation needs     Medical: Not on file     Non-medical: Not on file   Tobacco Use    Smoking status: Never Smoker    Smokeless tobacco: Never Used   Substance and Sexual Activity    Alcohol use: Not Currently     Comment: occasionally     Drug use: No    Sexual activity: Yes     Partners: Female     Birth control/protection: None   Lifestyle    Physical activity     Days per week: Not on file     Minutes per session: Not on file    Stress: Not on file   Relationships    Social connections     Talks on phone: Not on file     Gets together: Not on file     Attends Baptism service: Not on file     Active member of club or organization: Not on file     Attends meetings of clubs or organizations: Not on file     Relationship status: Not on file   Other Topics Concern    Not on file   Social History Narrative    Disabled. The patient is the youngest of 6 siblings. Single. Lives with single-sex partner.                        ROS:  General ROS: negative  Respiratory ROS: no cough, shortness of breath, or wheezing  Cardiovascular ROS: no chest pain or dyspnea on exertion  Musculoskeletal ROS: negative  Neurological ROS:   negative for - gait disturbance, + numbness/tingling, seizures or tremors  Dermatological ROS: + nail changes, dry skin          LAST HbA1c:   Hemoglobin A1C   Date Value Ref Range Status   04/27/2020 6.4 (H) 4.0 - 5.6 % Final     Comment:     ADA Screening Guidelines:  5.7-6.4%  Consistent with prediabetes  >or=6.5%  Consistent with diabetes  High levels of fetal hemoglobin interfere with the HbA1C  assay. Heterozygous hemoglobin variants (HbS, HgC, etc)do  not significantly interfere with this assay.   However,  "presence of multiple variants may affect accuracy.     01/02/2020 6.6 (H) 4.0 - 5.6 % Final     Comment:     ADA Screening Guidelines:  5.7-6.4%  Consistent with prediabetes  >or=6.5%  Consistent with diabetes  High levels of fetal hemoglobin interfere with the HbA1C  assay. Heterozygous hemoglobin variants (HbS, HgC, etc)do  not significantly interfere with this assay.   However, presence of multiple variants may affect accuracy.     09/20/2019 6.4 (H) 4.0 - 5.6 % Final     Comment:     ADA Screening Guidelines:  5.7-6.4%  Consistent with prediabetes  >or=6.5%  Consistent with diabetes  High levels of fetal hemoglobin interfere with the HbA1C  assay. Heterozygous hemoglobin variants (HbS, HgC, etc)do  not significantly interfere with this assay.   However, presence of multiple variants may affect accuracy.           EXAM:   Vitals:    08/13/20 1057   BP: 116/61   Pulse: 81   Temp: 98 °F (36.7 °C)   Weight: 112.9 kg (249 lb)   Height: 5' 5" (1.651 m)       General: Pt. is well-developed, well-nourished, appears stated age, in no acute distress, alert and oriented x 3.      Vascular: Dorsalis pedis and posterior tibial pulses are 2/4 bilaterally. 3 secs capillary refill time and toes are warm to touch.    There is reduced hair growth on the feet.    There is 1+ edema.  no varicosities.      Neurological:  Light touch, proprioception, and sharp/dull sensation are all intact bilaterally. Protective threshold with the [place] monofilament is diminished bilaterally.   +paresthesias      Dermatological:  The skin is thin    The toenails  1-5 L and 1-5 R  are greenish- yellow, long by 5-6mm and thickened by 2-3mm with subungual debris  .   There are no open wounds. There is no ecchymoses.  no erythema noted.   Skin diffusely dry on the soles     Interdigital spaces are intact, no fissures    Skin atrophic, thin, dry and mildly hyperpigmented.     Musculoskeletal:  Muscle strength is 5/5 in all groups bilaterally.  " Metatarsophalangeal, subtalar, and ankle range of motion are intact without crepitus.           Assessment / Plan:    Problem List Items Addressed This Visit     None      Visit Diagnoses     Type 2 diabetes mellitus with diabetic neuropathy, unspecified whether long term insulin use    -  Primary    Dermatophytosis of nail                  Nails trimmed per Procedure B.  She hasn't seen her PCP in the last six months.

## 2020-09-11 DIAGNOSIS — G89.29 BILATERAL CHRONIC KNEE PAIN: ICD-10-CM

## 2020-09-11 DIAGNOSIS — M17.0 PRIMARY OSTEOARTHRITIS OF BOTH KNEES: ICD-10-CM

## 2020-09-11 DIAGNOSIS — M47.816 SPONDYLOSIS OF LUMBAR REGION WITHOUT MYELOPATHY OR RADICULOPATHY: ICD-10-CM

## 2020-09-11 DIAGNOSIS — M25.562 BILATERAL CHRONIC KNEE PAIN: ICD-10-CM

## 2020-09-11 DIAGNOSIS — M25.561 BILATERAL CHRONIC KNEE PAIN: ICD-10-CM

## 2020-09-11 DIAGNOSIS — G89.4 CHRONIC PAIN DISORDER: ICD-10-CM

## 2020-09-11 DIAGNOSIS — Z79.891 ENCOUNTER FOR LONG-TERM OPIATE ANALGESIC USE: Primary | ICD-10-CM

## 2020-09-11 DIAGNOSIS — M47.816 FACET ARTHRITIS OF LUMBAR REGION: ICD-10-CM

## 2020-09-11 DIAGNOSIS — Z96.651 STATUS POST TOTAL RIGHT KNEE REPLACEMENT: ICD-10-CM

## 2020-09-11 DIAGNOSIS — M51.36 DDD (DEGENERATIVE DISC DISEASE), LUMBAR: ICD-10-CM

## 2020-09-11 RX ORDER — OXYCODONE AND ACETAMINOPHEN 10; 325 MG/1; MG/1
1 TABLET ORAL EVERY 8 HOURS PRN
Qty: 90 TABLET | Refills: 0 | Status: SHIPPED | OUTPATIENT
Start: 2020-09-12 | End: 2020-10-12 | Stop reason: SDUPTHER

## 2020-09-11 NOTE — TELEPHONE ENCOUNTER
----- Message from Vera Ritchie sent at 9/11/2020 12:16 PM CDT -----  Regarding: Refill  Can the clinic reply in [facility]: No  Please refill the medication(s) listed below. Please call the patient when the prescription(s) is ready for  at this phone number   [phone] Medication #1-oxyCODONE-acetaminophen (PERCOCET)  mg per tabletPreferred Pharmacy: [facility] [address]

## 2020-09-11 NOTE — TELEPHONE ENCOUNTER
Patient completed audio visit with Adeola Robledo. This is a  patient. Patient last received this medication 08/13/20. Patient completed UDS 10/16/19 and was consistent. Patient has a pain contract on file. Patient has a follow up 09/24/20.

## 2020-09-23 ENCOUNTER — PATIENT OUTREACH (OUTPATIENT)
Dept: ADMINISTRATIVE | Facility: OTHER | Age: 55
End: 2020-09-23

## 2020-09-23 ENCOUNTER — OFFICE VISIT (OUTPATIENT)
Dept: PAIN MEDICINE | Facility: CLINIC | Age: 55
End: 2020-09-23
Payer: MEDICARE

## 2020-09-23 VITALS
TEMPERATURE: 98 F | HEIGHT: 65 IN | WEIGHT: 244.25 LBS | HEART RATE: 64 BPM | SYSTOLIC BLOOD PRESSURE: 129 MMHG | BODY MASS INDEX: 40.69 KG/M2 | DIASTOLIC BLOOD PRESSURE: 85 MMHG

## 2020-09-23 DIAGNOSIS — M51.36 DDD (DEGENERATIVE DISC DISEASE), LUMBAR: ICD-10-CM

## 2020-09-23 DIAGNOSIS — Z96.653 STATUS POST TOTAL BILATERAL KNEE REPLACEMENT: ICD-10-CM

## 2020-09-23 DIAGNOSIS — Z51.81 ENCOUNTER FOR MONITORING OPIOID MAINTENANCE THERAPY: ICD-10-CM

## 2020-09-23 DIAGNOSIS — Z79.891 ENCOUNTER FOR MONITORING OPIOID MAINTENANCE THERAPY: ICD-10-CM

## 2020-09-23 DIAGNOSIS — M25.561 CHRONIC PAIN OF BOTH KNEES: Primary | ICD-10-CM

## 2020-09-23 DIAGNOSIS — G89.29 CHRONIC PAIN OF BOTH KNEES: Primary | ICD-10-CM

## 2020-09-23 DIAGNOSIS — M47.816 LUMBAR SPONDYLOSIS: ICD-10-CM

## 2020-09-23 DIAGNOSIS — M53.3 SACROILIAC JOINT PAIN: ICD-10-CM

## 2020-09-23 DIAGNOSIS — G89.4 CHRONIC PAIN SYNDROME: ICD-10-CM

## 2020-09-23 DIAGNOSIS — M25.562 CHRONIC PAIN OF BOTH KNEES: Primary | ICD-10-CM

## 2020-09-23 PROCEDURE — 99213 PR OFFICE/OUTPT VISIT, EST, LEVL III, 20-29 MIN: ICD-10-PCS | Mod: S$PBB,,, | Performed by: NURSE PRACTITIONER

## 2020-09-23 PROCEDURE — 99999 PR PBB SHADOW E&M-EST. PATIENT-LVL IV: CPT | Mod: PBBFAC,,, | Performed by: NURSE PRACTITIONER

## 2020-09-23 PROCEDURE — 99213 OFFICE O/P EST LOW 20 MIN: CPT | Mod: S$PBB,,, | Performed by: NURSE PRACTITIONER

## 2020-09-23 PROCEDURE — 99999 PR PBB SHADOW E&M-EST. PATIENT-LVL IV: ICD-10-PCS | Mod: PBBFAC,,, | Performed by: NURSE PRACTITIONER

## 2020-09-23 PROCEDURE — 99214 OFFICE O/P EST MOD 30 MIN: CPT | Mod: PBBFAC | Performed by: NURSE PRACTITIONER

## 2020-09-23 PROCEDURE — 80307 DRUG TEST PRSMV CHEM ANLYZR: CPT

## 2020-09-23 NOTE — PROGRESS NOTES
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 past.   Patient reports no other health changes since previous encounter.    Interval History 04/09/2015:  Patient presents in clinic for one month follow up. She reports bilateral knee pain and low back pain is a 9/10 today. She currently takes percocet for pain as needed and uses a cane for ambulation. She states that the low back pain is new.  She continues to have bilateral knee pain and is in physical therapy.  She continues to take Celebrex daily and uses a topical pain cream regularly.    Patient reports no other health changes since previous encounter.    Interval history 3/5/2015:  Since previous encounter patient is status post right total knee replacement on 11/4/2014 and has been healed with postoperative visits showing good progress.  The patient does have continued physical therapy sessions and has been attempting to lose weight and has lost approximately 25 pounds although her BMI continues to be 54.  She has been making good efforts to try and continue to increase her range of motion and lose weight.  She continues to have significant pain in bilateral knees and continues to use a cane for ambulation.  She was receiving oxycodone/acetaminophen 10/325 every 8 hours by mouth when necessary and requiring in order to persist in her physical therapy although the topical pain cream that she has been  applying has been helping her to a limited degree she still requires the medication regularly.  Her recent x-ray imaging shows good positioning of the prosthesis.  No other health changes since previous encounter.     Interval history 2/17/2014:  Patient reports that she has been using the topical compounded cream on the knee approximately 4 times per day and she states that it does help her with her pain that she is experiencing.  She states that she has not gone to formal physical therapy but that she has been going to a pool that has exercise classes which is free for her and that she feels like it is helping her continue to lose weight.  Additionally she continues using hydrocodone/acetaminophen 7.5/750 approximately 3 times per day when necessary and states that also helps with her pain symptoms.  He she's still talks to have replacement for the left knee, but would like to lose weight further before going to that.  She has also had previous injections into her knees which have offered little to no relief in her pain symptoms.  She has had no other health changes since previous encounter.    Previous encounter 1/21/2014:  HPI Comments: 47 yo female presents for initial evaluation of bilateral knee pain, L>R. She is s/p arthroscopic right knee surgery and eventual replacement and then revision surgeries (Dr. Swan, Orthopedics). The pain is present in both knees and is described as a terrible ache. She hears occasional popping in both knees with movement. The pain is worse with being on her feet and getting up from a sitting to standing position. Denies lower ext weakness or paresthesias. She does not have back pain or pain radiating down her legs. The pain is better with Celebrex and rest. She also takes Vicodin ES TID but this makes her very sleepy. She is not sure it helps with the pain because she usually falls asleep.     Physical Therapy: not since 2011 just prior to her 3rd right knee surgery (revision  after replacement); has tried swimming which helps with weight loss    Non-pharmacologic Treatment: none    Pain Medications: Percocet and celebrex    Blood thinners: ASA 81 mg daily     Interventional Therapies:   steroid inj and visco-supplementation (series of 3) in left knee- not helpful  3/31/14 Bilateral genicular nerve blocks  2/16/16 Bilateral L2,3,4,5 MBB  3/1/16 Bilateral L2,3,4,5 MBB   4/6/16 Left L2,3,4,5 RFA- significant relief  4/20/16 Right L2,3,4,5 RFA- significant relief  10/5/16 Left L2,3,4,5 cooled RFA  10/19/16 Right L2,3,4,5 cooled RFA  4/26/17 Left L2,3,4,5 cooled RFA  5/9/17 Right L2,3,4,5 cooled RFA  12/26/2017- Left L2,3,4,5 cooled RFA  1/9/2018- Right L2,3,4,5 cooled RFA  6/26/18 Left L2,3,4,5 cooled RFA- 60% relief  7/10/18 Right L2,3,4,5 cooled RFA- 60% relief  9/28/18 TPIs- significant relief  12/27/18 Left L2,3,4,5 RFA- 70% relief  1/29/19 Right L2,3,4,5 RFA- 70% relief  6/18/19 Left L2,3,4,5 RFA- 70% relief  7/9/19 Right L2,3,4,5 RFA- 50% relief  12/19/19 Left L2,3,4,5 RFA- 80% relief  12/31/19 Right L2,3,4,5 RFA- 50% relief  3/2/2020- Right genicular nerve block- 70% relief for one month  3/12/20 Left subacromial bursa injection- 90% relief  5/18/2020- Left genicular nerve block- 70%  6/9/2020- Bilateral SI joint injections- 50% relief    Relevant Surgeries: right knee surgery x3 (arthroscopic, then replacement and subsequent revision surgery), s/p left total knee arthroplasty    Relevant Imaging:  Xray Lumbar spine 09/21/2015  Lumbar spine radiograph    Comparison: None    Results: AP, lateral neutral, lateral flexion , lateral extension, bilateral oblique and spot views. The alignment of the lumbar spine demonstrates a mild levoscoliosis . 11-mm anterior listhesis of L4 relative to L5 with no translational abnormalities  seen on flexion and extension views. The vertebral body heights are well-maintained , mild disk space narrowing L4-L5 and L5-S1. Mild anterior and marginal  osteophyte formation seen throughout the lumbar spine . The oblique views demonstrate no   definite spondylolysis. There is facet joint osseous hypertrophy noted at L3-L4 and L4-L5.      Impression         The Significant spondylosis of the lumbar spine with grade 1 anterior listhesis L4 relative to L5.  Facet joint osseous hypertrophy L3-L4 and L4-5.      Electronically signed by: BLESSING ROE MD  Date: 09/21/15  Time: 09:56          X-ray knee bilateral 8/26/2015:  Standing AP knees, lateral view of both knees and sunrise view of both patella. Study compared to May 2015. Postop change of bilateral knee replacement. The prosthetic components are in satisfactory position. Erosive changes involving the patella   again evident bilaterally.    Impression no significant change.      Xray Bilateral Knee 05/25/2015:  There are bilateral total knee arthroplasties with posterior resurfacing of the patella. As observed on 12/17/2014 there is a fracture of the left patella in the parasagittal plane.    Heterotopic bone is evident about each knee.    I detect no dislocation, unusual radiopaque retained foreign body, lytic or blastic lesion, or chondrocalcinosis.    Cervical MRI 4/24/2018:    Narrative     EXAMINATION:  MRI CERVICAL SPINE WITHOUT CONTRAST    CLINICAL HISTORY:  Neck pain, first study;.  Cervicalgia.    TECHNIQUE:  Multiplanar, multisequence MR images of the cervical spine were acquired without the administration of contrast.    COMPARISON:  None.    FINDINGS:  There is reversal of the normal cervical lordosis which could be related to patient positioning versus muscle spasm.    Cervical vertebral body heights are well maintained without evidence of acute fracture or dislocation.  Marrow signal is unremarkable.    Spinal canal contents are unremarkable.  No abnormal masses or collections.    Individual levels as detailed below:    C2-3: No significant spinal canal stenosis or neuroforaminal  narrowing.    C3-4: Facet arthropathy.  No significant spinal canal stenosis or neuroforaminal narrowing.    C4-5: Facet arthropathy.  Small broad-based disc osteophyte complex.  Mild right neuroforaminal narrowing.  Mild spinal canal stenosis.    C5-6: Facet arthropathy.  Uncovertebral joint spurring.  Broad-based posterior disc osteophyte complex.  Mild right neuroforaminal narrowing.  Mild spinal canal stenosis.    C6-7: Facet arthropathy.  No significant spinal canal stenosis or neuroforaminal narrowing.    C7-T1: No significant spinal canal stenosis or neuroforaminal narrowing.    Paraspinal muscles are unremarkable.  Soft tissues are intact.   Impression       Mild multilevel cervical spondylosis with mild spinal canal stenosis and mild right neuroforaminal narrowing at C4-5 and C5-6.       Lab Results   Component Value Date    HGBA1C 6.4 (H) 04/27/2020     Lab Results   Component Value Date    CHOL 152 03/04/2020    CHOL 160 01/02/2020    CHOL 221 (H) 11/14/2019     Lab Results   Component Value Date    HDL 51 03/04/2020    HDL 49 01/02/2020    HDL 46 11/14/2019     Lab Results   Component Value Date    LDLCALC 88.6 03/04/2020    LDLCALC 97.8 01/02/2020    LDLCALC 157.0 11/14/2019     Lab Results   Component Value Date    TRIG 62 03/04/2020    TRIG 66 01/02/2020    TRIG 90 11/14/2019     Lab Results   Component Value Date    CHOLHDL 33.6 03/04/2020    CHOLHDL 30.6 01/02/2020    CHOLHDL 20.8 11/14/2019         Past Medical History:   Diagnosis Date    Allergy     Anemia     Asthma     Bilateral shoulder bursitis     Cervical stenosis of spine     Chronic pain     DDD (degenerative disc disease), cervical     Depression 1/28/2019    Diabetes mellitus type II     DJD (degenerative joint disease) of knee 6/19/2014    Facet arthritis of lumbar region 12/17/2015    Facet syndrome 12/17/2015    GERD (gastroesophageal reflux disease)     Heartburn     Hyperlipidemia     Hypertension     Morbid  obesity     Neuromuscular disorder     PADDY (obstructive sleep apnea)     Proteinuria     Right carpal tunnel syndrome     Sacroiliitis 6/13/2018    Sacroiliitis     Sleep apnea      Past Surgical History:   Procedure Laterality Date    CARPAL TUNNEL RELEASE      CARPAL TUNNEL RELEASE  1980s    left    CARPAL TUNNEL RELEASE  2012    right    COLONOSCOPY N/A 6/15/2020    Procedure: COLONOSCOPY;  Surgeon: Jc Koo MD;  Location: [facility] (4TH FLR);  Service: Endoscopy;  Laterality: N/A;  COVID screening on 6/13/20 at [facility]  pt updated on drop off location and no visitor [facility]    ESOPHAGOGASTRODUODENOSCOPY N/A 3/27/2019    Procedure: EGD (ESOPHAGOGASTRODUODENOSCOPY);  Surgeon: Robbin Bar MD;  Location: [facility] (2ND FLR);  Service: Endoscopy;  Laterality: N/A;  BMI 61.3/2nd floor case/svn    INJECTION OF JOINT Bilateral 6/9/2020    Procedure: INJECTION, JOINT, BILATERAL SI;  Surgeon: Lina Wagner MD;  Location: [facility] PAIN MGT;  Service: Pain Management;  Laterality: Bilateral;  B/L SI Joint Injection    JOINT REPLACEMENT Bilateral     with 2 revisions on rt    KNEE SURGERY  3/2010    orthroscope    KNEE SURGERY  6-19-14    left TKR    LAPAROSCOPIC SLEEVE GASTRECTOMY N/A 8/28/2019    Procedure: GASTRECTOMY, SLEEVE, LAPAROSCOPIC with intraop EGD;  Surgeon: Heriberto Clements MD;  Location: [facility] OR 2ND FLR;  Service: General;  Laterality: N/A;    RADIOFREQUENCY ABLATION Right 7/9/2019    Procedure: RADIOFREQUENCY ABLATION;  Surgeon: Lina Wagner MD;  Location: [facility] PAIN MGT;  Service: Pain Management;  Laterality: Right;  RIGHT RFA L2,3,4,5  2 of 2    RADIOFREQUENCY ABLATION Left 12/19/2019    Procedure: RADIOFREQUENCY ABLATION, LEFT L2-L3-L4-L5 MEDIAL BRANCH 1 OF 2;  Surgeon: Lina Wagner MD;  Location: [facility] PAIN MGT;  Service: Pain Management;  Laterality: Left;    RADIOFREQUENCY ABLATION Right 12/31/2019    Procedure: RADIOFREQUENCY ABLATION, RIGHT  L2-L3-L4-L5  2 OF 2;  Surgeon: Lina Wagner MD;  Location: [facility] [place] PAIN MGT;  Service: Pain Management;  Laterality: Right;    RADIOFREQUENCY ABLATION OF LUMBAR MEDIAL BRANCH NERVE AT SINGLE LEVEL Left 6/26/2018    Procedure: RADIOFREQUENCY ABLATION, NERVE, MEDIAL BRANCH, LUMBAR, 1 LEVEL;  Surgeon: Lina Wagner MD;  Location: [facility] [place] PAIN MGT;  Service: Pain Management;  Laterality: Left;  Left Cooled RFA @ L2,3,4,5  [id number]  with Sedation    1 of 2    RADIOFREQUENCY ABLATION OF LUMBAR MEDIAL BRANCH NERVE AT SINGLE LEVEL Right 7/10/2018    Procedure: RADIOFREQUENCY ABLATION, NERVE, MEDIAL BRANCH, LUMBAR, 1 LEVEL;  Surgeon: Lina Wagner MD;  Location: [facility] [place] PAIN MGT;  Service: Pain Management;  Laterality: Right;  RIght Cooled RFA @ L2,3,4,5  [id number] with Sedation    2 of 2    TRIGGER FINGER RELEASE Right 2017    TRIGGER FINGER RELEASE Left 7/29/2019    Procedure: RELEASE, TRIGGER FINGER, Left Thumb;  Surgeon: Velma Garcia MD;  Location: [facility] [place] OR;  Service: Orthopedics;  Laterality: Left;  Local w/ MAC     Family History   Problem Relation Age of Onset    Diabetes Mother     Cataracts Mother     Diabetes Father     Cataracts Father     Coronary artery disease Brother     Diabetes Brother     Hypertension Sister     Diabetes Sister     No Known Problems Sister     Cancer Sister         lymphoma     Diabetes Brother     Hypotension Brother     Kidney failure Brother     Hypotension Brother     Amblyopia Neg Hx     Blindness Neg Hx     Glaucoma Neg Hx     Macular degeneration Neg Hx     Retinal detachment Neg Hx     Strabismus Neg Hx     Stroke Neg Hx     Thyroid disease Neg Hx      Social History     Socioeconomic History    Marital status:      Spouse name: Not on file    Number of children: Not on file    Years of education: Not on file    Highest education level: Not on file   Occupational History    Not on file   Social Needs    Financial resource  strain: Not on file    Food insecurity     Worry: Not on file     Inability: Not on file    Transportation needs     Medical: Not on file     Non-medical: Not on file   Tobacco Use    Smoking status: Never Smoker    Smokeless tobacco: Never Used   Substance and Sexual Activity    Alcohol use: Not Currently     Comment: occasionally     Drug use: No    Sexual activity: Yes     Partners: Female     Birth control/protection: None   Lifestyle    Physical activity     Days per week: Not on file     Minutes per session: Not on file    Stress: Not on file   Relationships    Social connections     Talks on phone: Not on file     Gets together: Not on file     Attends [employer] service: Not on file     Active member of club or organization: Not on file     Attends meetings of clubs or organizations: Not on file     Relationship status: Not on file   Other Topics Concern    Not on file   Social History Narrative    Disabled. The patient is the youngest of 6 siblings. Single. Lives with single-sex partner.                  Review of patient's allergies indicates:  No Known Allergies    Medication List with Changes/Refills   Current Medications    ALBUTEROL (VENTOLIN HFA) 90 MCG/ACTUATION INHALER    INHALE TWO PUFFS INTO LUNGS EVERY 4 TO 6 HOURS AS NEEDED FOR SHORTNESS OF BREATH AND FOR WHEEZING    ALLOPURINOL (ZYLOPRIM) 300 MG TABLET    Take 1 tablet (300 mg total) by mouth 2 (two) times daily.    ATORVASTATIN (LIPITOR) 20 MG TABLET    Take 1 tablet (20 mg total) by mouth once daily.    B COMPLEX VITAMINS TABLET    Take 1 tablet by mouth every other day.     CALCIUM CITRATE/VITAMIN D2 (ISIAH-CITRATE ORAL)    Take 500 mg by mouth 2 (two) times daily.    CYANOCOBALAMIN (VITAMIN B-12) 1000 MCG TABLET    Take 100 mcg by mouth every morning.    FLUOXETINE (PROZAC) 20 MG/5 ML (4 MG/ML) SOLUTION    Take 10 mLs (40 mg total) by mouth once daily.    FLUTICASONE (FLONASE) 50 MCG/ACTUATION NASAL SPRAY    1 spray by Each Nare  "route 2 (two) times daily as needed for Rhinitis.    GABAPENTIN (NEURONTIN) 300 MG CAPSULE    Take 1 pill QHS for 7 days then take 1 pill BID for 7 days then take pill TID    LEVOFLOXACIN (LEVAQUIN) 250 MG/10 ML SOLN    Take 20 mLs (500 mg total) by mouth once daily.    LEVOFLOXACIN (LEVAQUIN) 500 MG TABLET    Take 1 tablet (500 mg total) by mouth once daily.    MULTIVIT-IRON-MIN-FOLIC ACID 3,500-18-0.4 UNIT-MG-MG ORAL CHEW    Take 1 tablet by mouth 2 (two) times daily.     OMEPRAZOLE (PRILOSEC) 20 MG CAPSULE    TAKE 1 CAPSULE (20 MG TOTAL) BY MOUTH EVERY MORNING.    ONDANSETRON (ZOFRAN) 4 MG TABLET    Take 1 tablet (4 mg total) by mouth every 6 (six) hours as needed.    OXYCODONE-ACETAMINOPHEN (PERCOCET)  MG PER TABLET    Take 1 tablet by mouth every 8 (eight) hours as needed for Pain. Greater than 7 day quantity medically necessary    VITAMIN D 185 MG TAB    Take 5,000 mg by mouth every morning.          REVIEW OF SYSTEMS:    GENERAL:  Recent weight loss.  RESPIRATORY:  Negative for cough, wheezing or shortness of breath, patient denies any recent URI.  CARDIOVASCULAR:  Negative for chest pain, leg swelling or palpitations.  GI:  Negative for abdominal discomfort, blood in stools or black stools or change in bowel habits, occasional constipation.  MUSCULOSKELETAL:  See HPI.  SKIN:  Negative for lesions, rash, and itching.  PSYCH:  No mood disorder or recent psychosocial stressors.  Patient's sleep is disturbed secondary to pain (patient reports also that she has insomnia).  HEMATOLOGY/LYMPHOLOGY:  Negative for prolonged bleeding, bruising easily or swollen nodes.  81 mg aspirin  ENDO: Patient has a history of diabetes.  NEURO:   No history of headaches, syncope, paralysis, seizures or tremors.  All other reviewed and negative other than HPI.    OBJECTIVE:    /85   Pulse 64   Temp 97.5 °F (36.4 °C)   Ht 5' 5" (1.651 m)   Wt 110.8 kg (244 lb 4.3 oz)   LMP 06/26/2018   BMI 40.65 kg/m²     PHYSICAL " EXAMINATION:     GENERAL: Well appearing, in no acute distress, alert and oriented x3.   PSYCH:  Mood and affect appropriate.  SKIN: Skin color, texture, turgor normal, no rashes or lesions.  HEAD/FACE:  Normocephalic, atraumatic.   CV: RRR with palpation of the radial artery.  PULM: No evidence of respiratory difficulty, symmetric chest rise.  BACK: Negative SLR bilaterally. There is pain to palpation over the lumbar paraspinals bilaterally. There is pain with palpation over lumbar facet joints bilaterally. Limited ROM with pain on extension.  Positive facet loading bilaterally,  EXTREMITIES: There is mild pain with palpation to bilateral SI joints.  JENNA is negative bilaterally. Well-healed midline scars to bilateral knees.  Painful extension and flexion of bilateral knees. Medial and lateral joint line tenderness to bilateral knees.  MUSCULOSKELETAL: Bilateral upper and lower extremity strength is normal and symmetric.  No atrophy or tone abnormalities are noted.  NEURO: Bilateral lower extremity coordination and muscle stretch reflexes are physiologic and symmetric.  Plantar response are downgoing. No clonus.  No loss of sensation is noted.  GAIT: Antalgic- ambulates without assistance.        Assessment:       Encounter Diagnoses   Name Primary?    Chronic pain of both knees Yes    Status post total bilateral knee replacement     Lumbar spondylosis     DDD (degenerative disc disease), lumbar     Sacroiliac joint pain     Chronic pain syndrome     Encounter for monitoring opioid maintenance therapy          Plan:       - Previous imaging was reviewed and discussed with the patient today.     - Schedule for right then left genicular cooled RFA, one side at a time, two weeks apart.     - She is s/p bilateral SI joint injections with benefit. We can repeat this as needed.     - She may also benefit from repeat lumbar RFA.     - Continue Percocet 10/325 mg TID PRN. She does not need a refill today. She may  call for refills.     - The patient is here today for a refill of current pain medications and they believe these provide effective pain control and improvements in quality of life.  The patient notes no serious side effects, and feels the benefits outweigh the risks.  The patient was reminded of the pain contract that they signed previously as well as the risks and benefits of the medication including possible death.  The updated [place] Board  Pharmacy prescription monitoring program was reviewed, and the patient has been found to be compliant with current treatment plan.    - UDS from 2019 reviewed. Repeat UDS done today.     - RTC 3 weeks after above procedures.     - Dr. Wagner was consulted on the patient and agrees with this plan.    The above plan and management options were discussed at length with patient. Patient is in agreement with the above and verbalized understanding.     Adeola Robledo NP  09/23/2020

## 2020-09-23 NOTE — H&P (VIEW-ONLY)
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 past.   Patient reports no other health changes since previous encounter.    Interval History 04/09/2015:  Patient presents in clinic for one month follow up. She reports bilateral knee pain and low back pain is a 9/10 today. She currently takes percocet for pain as needed and uses a cane for ambulation. She states that the low back pain is new.  She continues to have bilateral knee pain and is in physical therapy.  She continues to take Celebrex daily and uses a topical pain cream regularly.    Patient reports no other health changes since previous encounter.    Interval history 3/5/2015:  Since previous encounter patient is status post right total knee replacement on 11/4/2014 and has been healed with postoperative visits showing good progress.  The patient does have continued physical therapy sessions and has been attempting to lose weight and has lost approximately 25 pounds although her BMI continues to be 54.  She has been making good efforts to try and continue to increase her range of motion and lose weight.  She continues to have significant pain in bilateral knees and continues to use a cane for ambulation.  She was receiving oxycodone/acetaminophen 10/325 every 8 hours by mouth when necessary and requiring in order to persist in her physical therapy although the topical pain cream that she has been  applying has been helping her to a limited degree she still requires the medication regularly.  Her recent x-ray imaging shows good positioning of the prosthesis.  No other health changes since previous encounter.     Interval history 2/17/2014:  Patient reports that she has been using the topical compounded cream on the knee approximately 4 times per day and she states that it does help her with her pain that she is experiencing.  She states that she has not gone to formal physical therapy but that she has been going to a pool that has exercise classes which is free for her and that she feels like it is helping her continue to lose weight.  Additionally she continues using hydrocodone/acetaminophen 7.5/750 approximately 3 times per day when necessary and states that also helps with her pain symptoms.  He she's still talks to have replacement for the left knee, but would like to lose weight further before going to that.  She has also had previous injections into her knees which have offered little to no relief in her pain symptoms.  She has had no other health changes since previous encounter.    Previous encounter 1/21/2014:  HPI Comments: 49 yo female presents for initial evaluation of bilateral knee pain, L>R. She is s/p arthroscopic right knee surgery and eventual replacement and then revision surgeries (Dr. Swan, Orthopedics). The pain is present in both knees and is described as a terrible ache. She hears occasional popping in both knees with movement. The pain is worse with being on her feet and getting up from a sitting to standing position. Denies lower ext weakness or paresthesias. She does not have back pain or pain radiating down her legs. The pain is better with Celebrex and rest. She also takes Vicodin ES TID but this makes her very sleepy. She is not sure it helps with the pain because she usually falls asleep.     Physical Therapy: not since 2011 just prior to her 3rd right knee surgery (revision  after replacement); has tried swimming which helps with weight loss    Non-pharmacologic Treatment: none    Pain Medications: Percocet and celebrex    Blood thinners: ASA 81 mg daily     Interventional Therapies:   steroid inj and visco-supplementation (series of 3) in left knee- not helpful  3/31/14 Bilateral genicular nerve blocks  2/16/16 Bilateral L2,3,4,5 MBB  3/1/16 Bilateral L2,3,4,5 MBB   4/6/16 Left L2,3,4,5 RFA- significant relief  4/20/16 Right L2,3,4,5 RFA- significant relief  10/5/16 Left L2,3,4,5 cooled RFA  10/19/16 Right L2,3,4,5 cooled RFA  4/26/17 Left L2,3,4,5 cooled RFA  5/9/17 Right L2,3,4,5 cooled RFA  12/26/2017- Left L2,3,4,5 cooled RFA  1/9/2018- Right L2,3,4,5 cooled RFA  6/26/18 Left L2,3,4,5 cooled RFA- 60% relief  7/10/18 Right L2,3,4,5 cooled RFA- 60% relief  9/28/18 TPIs- significant relief  12/27/18 Left L2,3,4,5 RFA- 70% relief  1/29/19 Right L2,3,4,5 RFA- 70% relief  6/18/19 Left L2,3,4,5 RFA- 70% relief  7/9/19 Right L2,3,4,5 RFA- 50% relief  12/19/19 Left L2,3,4,5 RFA- 80% relief  12/31/19 Right L2,3,4,5 RFA- 50% relief  3/2/2020- Right genicular nerve block- 70% relief for one month  3/12/20 Left subacromial bursa injection- 90% relief  5/18/2020- Left genicular nerve block- 70%  6/9/2020- Bilateral SI joint injections- 50% relief    Relevant Surgeries: right knee surgery x3 (arthroscopic, then replacement and subsequent revision surgery), s/p left total knee arthroplasty    Relevant Imaging:  Xray Lumbar spine 09/21/2015  Lumbar spine radiograph    Comparison: None    Results: AP, lateral neutral, lateral flexion , lateral extension, bilateral oblique and spot views. The alignment of the lumbar spine demonstrates a mild levoscoliosis . 11-mm anterior listhesis of L4 relative to L5 with no translational abnormalities  seen on flexion and extension views. The vertebral body heights are well-maintained , mild disk space narrowing L4-L5 and L5-S1. Mild anterior and marginal  osteophyte formation seen throughout the lumbar spine . The oblique views demonstrate no   definite spondylolysis. There is facet joint osseous hypertrophy noted at L3-L4 and L4-L5.      Impression         The Significant spondylosis of the lumbar spine with grade 1 anterior listhesis L4 relative to L5.  Facet joint osseous hypertrophy L3-L4 and L4-5.      Electronically signed by: BLESSING ROE MD  Date: 09/21/15  Time: 09:56          X-ray knee bilateral 8/26/2015:  Standing AP knees, lateral view of both knees and sunrise view of both patella. Study compared to May 2015. Postop change of bilateral knee replacement. The prosthetic components are in satisfactory position. Erosive changes involving the patella   again evident bilaterally.    Impression no significant change.      Xray Bilateral Knee 05/25/2015:  There are bilateral total knee arthroplasties with posterior resurfacing of the patella. As observed on 12/17/2014 there is a fracture of the left patella in the parasagittal plane.    Heterotopic bone is evident about each knee.    I detect no dislocation, unusual radiopaque retained foreign body, lytic or blastic lesion, or chondrocalcinosis.    Cervical MRI 4/24/2018:    Narrative     EXAMINATION:  MRI CERVICAL SPINE WITHOUT CONTRAST    CLINICAL HISTORY:  Neck pain, first study;.  Cervicalgia.    TECHNIQUE:  Multiplanar, multisequence MR images of the cervical spine were acquired without the administration of contrast.    COMPARISON:  None.    FINDINGS:  There is reversal of the normal cervical lordosis which could be related to patient positioning versus muscle spasm.    Cervical vertebral body heights are well maintained without evidence of acute fracture or dislocation.  Marrow signal is unremarkable.    Spinal canal contents are unremarkable.  No abnormal masses or collections.    Individual levels as detailed below:    C2-3: No significant spinal canal stenosis or neuroforaminal  narrowing.    C3-4: Facet arthropathy.  No significant spinal canal stenosis or neuroforaminal narrowing.    C4-5: Facet arthropathy.  Small broad-based disc osteophyte complex.  Mild right neuroforaminal narrowing.  Mild spinal canal stenosis.    C5-6: Facet arthropathy.  Uncovertebral joint spurring.  Broad-based posterior disc osteophyte complex.  Mild right neuroforaminal narrowing.  Mild spinal canal stenosis.    C6-7: Facet arthropathy.  No significant spinal canal stenosis or neuroforaminal narrowing.    C7-T1: No significant spinal canal stenosis or neuroforaminal narrowing.    Paraspinal muscles are unremarkable.  Soft tissues are intact.   Impression       Mild multilevel cervical spondylosis with mild spinal canal stenosis and mild right neuroforaminal narrowing at C4-5 and C5-6.       Lab Results   Component Value Date    HGBA1C 6.4 (H) 04/27/2020     Lab Results   Component Value Date    CHOL 152 03/04/2020    CHOL 160 01/02/2020    CHOL 221 (H) 11/14/2019     Lab Results   Component Value Date    HDL 51 03/04/2020    HDL 49 01/02/2020    HDL 46 11/14/2019     Lab Results   Component Value Date    LDLCALC 88.6 03/04/2020    LDLCALC 97.8 01/02/2020    LDLCALC 157.0 11/14/2019     Lab Results   Component Value Date    TRIG 62 03/04/2020    TRIG 66 01/02/2020    TRIG 90 11/14/2019     Lab Results   Component Value Date    CHOLHDL 33.6 03/04/2020    CHOLHDL 30.6 01/02/2020    CHOLHDL 20.8 11/14/2019         Past Medical History:   Diagnosis Date    Allergy     Anemia     Asthma     Bilateral shoulder bursitis     Cervical stenosis of spine     Chronic pain     DDD (degenerative disc disease), cervical     Depression 1/28/2019    Diabetes mellitus type II     DJD (degenerative joint disease) of knee 6/19/2014    Facet arthritis of lumbar region 12/17/2015    Facet syndrome 12/17/2015    GERD (gastroesophageal reflux disease)     Heartburn     Hyperlipidemia     Hypertension     Morbid  obesity     Neuromuscular disorder     PADDY (obstructive sleep apnea)     Proteinuria     Right carpal tunnel syndrome     Sacroiliitis 6/13/2018    Sacroiliitis     Sleep apnea      Past Surgical History:   Procedure Laterality Date    CARPAL TUNNEL RELEASE      CARPAL TUNNEL RELEASE  1980s    left    CARPAL TUNNEL RELEASE  2012    right    COLONOSCOPY N/A 6/15/2020    Procedure: COLONOSCOPY;  Surgeon: Jc Koo MD;  Location: [facility] (4TH FLR);  Service: Endoscopy;  Laterality: N/A;  COVID screening on 6/13/20 at [facility]  pt updated on drop off location and no visitor [facility]    ESOPHAGOGASTRODUODENOSCOPY N/A 3/27/2019    Procedure: EGD (ESOPHAGOGASTRODUODENOSCOPY);  Surgeon: Robbin Bar MD;  Location: [facility] (2ND FLR);  Service: Endoscopy;  Laterality: N/A;  BMI 61.3/2nd floor case/svn    INJECTION OF JOINT Bilateral 6/9/2020    Procedure: INJECTION, JOINT, BILATERAL SI;  Surgeon: Lina Wagner MD;  Location: [facility] PAIN MGT;  Service: Pain Management;  Laterality: Bilateral;  B/L SI Joint Injection    JOINT REPLACEMENT Bilateral     with 2 revisions on rt    KNEE SURGERY  3/2010    orthroscope    KNEE SURGERY  6-19-14    left TKR    LAPAROSCOPIC SLEEVE GASTRECTOMY N/A 8/28/2019    Procedure: GASTRECTOMY, SLEEVE, LAPAROSCOPIC with intraop EGD;  Surgeon: Heriberto Clements MD;  Location: [facility] OR 2ND FLR;  Service: General;  Laterality: N/A;    RADIOFREQUENCY ABLATION Right 7/9/2019    Procedure: RADIOFREQUENCY ABLATION;  Surgeon: Lina Wagner MD;  Location: [facility] PAIN MGT;  Service: Pain Management;  Laterality: Right;  RIGHT RFA L2,3,4,5  2 of 2    RADIOFREQUENCY ABLATION Left 12/19/2019    Procedure: RADIOFREQUENCY ABLATION, LEFT L2-L3-L4-L5 MEDIAL BRANCH 1 OF 2;  Surgeon: Lina Wagner MD;  Location: [facility] PAIN MGT;  Service: Pain Management;  Laterality: Left;    RADIOFREQUENCY ABLATION Right 12/31/2019    Procedure: RADIOFREQUENCY ABLATION, RIGHT  L2-L3-L4-L5  2 OF 2;  Surgeon: Lina Wagner MD;  Location: [facility] PAIN MGT;  Service: Pain Management;  Laterality: Right;    RADIOFREQUENCY ABLATION OF LUMBAR MEDIAL BRANCH NERVE AT SINGLE LEVEL Left 6/26/2018    Procedure: RADIOFREQUENCY ABLATION, NERVE, MEDIAL BRANCH, LUMBAR, 1 LEVEL;  Surgeon: Lina Wagner MD;  Location: [facility] PAIN MGT;  Service: Pain Management;  Laterality: Left;  Left Cooled RFA @ L2,3,4,5  [id number]  with Sedation    1 of 2    RADIOFREQUENCY ABLATION OF LUMBAR MEDIAL BRANCH NERVE AT SINGLE LEVEL Right 7/10/2018    Procedure: RADIOFREQUENCY ABLATION, NERVE, MEDIAL BRANCH, LUMBAR, 1 LEVEL;  Surgeon: Lina Wagner MD;  Location: [facility] PAIN MGT;  Service: Pain Management;  Laterality: Right;  RIght Cooled RFA @ L2,3,4,5  [id number] with Sedation    2 of 2    TRIGGER FINGER RELEASE Right 2017    TRIGGER FINGER RELEASE Left 7/29/2019    Procedure: RELEASE, TRIGGER FINGER, Left Thumb;  Surgeon: Velma Garcia MD;  Location: [facility] OR;  Service: Orthopedics;  Laterality: Left;  Local w/ MAC     Family History   Problem Relation Age of Onset    Diabetes Mother     Cataracts Mother     Diabetes Father     Cataracts Father     Coronary artery disease Brother     Diabetes Brother     Hypertension Sister     Diabetes Sister     No Known Problems Sister     Cancer Sister         lymphoma     Diabetes Brother     Hypotension Brother     Kidney failure Brother     Hypotension Brother     Amblyopia Neg Hx     Blindness Neg Hx     Glaucoma Neg Hx     Macular degeneration Neg Hx     Retinal detachment Neg Hx     Strabismus Neg Hx     Stroke Neg Hx     Thyroid disease Neg Hx      Social History     Socioeconomic History    Marital status:      Spouse name: Not on file    Number of children: Not on file    Years of education: Not on file    Highest education level: Not on file   Occupational History    Not on file   Social Needs    Financial resource  strain: Not on file    Food insecurity     Worry: Not on file     Inability: Not on file    Transportation needs     Medical: Not on file     Non-medical: Not on file   Tobacco Use    Smoking status: Never Smoker    Smokeless tobacco: Never Used   Substance and Sexual Activity    Alcohol use: Not Currently     Comment: occasionally     Drug use: No    Sexual activity: Yes     Partners: Female     Birth control/protection: None   Lifestyle    Physical activity     Days per week: Not on file     Minutes per session: Not on file    Stress: Not on file   Relationships    Social connections     Talks on phone: Not on file     Gets together: Not on file     Attends Voodoo service: Not on file     Active member of club or organization: Not on file     Attends meetings of clubs or organizations: Not on file     Relationship status: Not on file   Other Topics Concern    Not on file   Social History Narrative    Disabled. The patient is the youngest of 6 siblings. Single. Lives with single-sex partner.                  Review of patient's allergies indicates:  No Known Allergies    Medication List with Changes/Refills   Current Medications    ALBUTEROL (VENTOLIN HFA) 90 MCG/ACTUATION INHALER    INHALE TWO PUFFS INTO LUNGS EVERY 4 TO 6 HOURS AS NEEDED FOR SHORTNESS OF BREATH AND FOR WHEEZING    ALLOPURINOL (ZYLOPRIM) 300 MG TABLET    Take 1 tablet (300 mg total) by mouth 2 (two) times daily.    ATORVASTATIN (LIPITOR) 20 MG TABLET    Take 1 tablet (20 mg total) by mouth once daily.    B COMPLEX VITAMINS TABLET    Take 1 tablet by mouth every other day.     CALCIUM CITRATE/VITAMIN D2 (ISIAH-CITRATE ORAL)    Take 500 mg by mouth 2 (two) times daily.    CYANOCOBALAMIN (VITAMIN B-12) 1000 MCG TABLET    Take 100 mcg by mouth every morning.    FLUOXETINE (PROZAC) 20 MG/5 ML (4 MG/ML) SOLUTION    Take 10 mLs (40 mg total) by mouth once daily.    FLUTICASONE (FLONASE) 50 MCG/ACTUATION NASAL SPRAY    1 spray by Each Nare  "route 2 (two) times daily as needed for Rhinitis.    GABAPENTIN (NEURONTIN) 300 MG CAPSULE    Take 1 pill QHS for 7 days then take 1 pill BID for 7 days then take pill TID    LEVOFLOXACIN (LEVAQUIN) 250 MG/10 ML SOLN    Take 20 mLs (500 mg total) by mouth once daily.    LEVOFLOXACIN (LEVAQUIN) 500 MG TABLET    Take 1 tablet (500 mg total) by mouth once daily.    MULTIVIT-IRON-MIN-FOLIC ACID 3,500-18-0.4 UNIT-MG-MG ORAL CHEW    Take 1 tablet by mouth 2 (two) times daily.     OMEPRAZOLE (PRILOSEC) 20 MG CAPSULE    TAKE 1 CAPSULE (20 MG TOTAL) BY MOUTH EVERY MORNING.    ONDANSETRON (ZOFRAN) 4 MG TABLET    Take 1 tablet (4 mg total) by mouth every 6 (six) hours as needed.    OXYCODONE-ACETAMINOPHEN (PERCOCET)  MG PER TABLET    Take 1 tablet by mouth every 8 (eight) hours as needed for Pain. Greater than 7 day quantity medically necessary    VITAMIN D 185 MG TAB    Take 5,000 mg by mouth every morning.          REVIEW OF SYSTEMS:    GENERAL:  Recent weight loss.  RESPIRATORY:  Negative for cough, wheezing or shortness of breath, patient denies any recent URI.  CARDIOVASCULAR:  Negative for chest pain, leg swelling or palpitations.  GI:  Negative for abdominal discomfort, blood in stools or black stools or change in bowel habits, occasional constipation.  MUSCULOSKELETAL:  See HPI.  SKIN:  Negative for lesions, rash, and itching.  PSYCH:  No mood disorder or recent psychosocial stressors.  Patient's sleep is disturbed secondary to pain (patient reports also that she has insomnia).  HEMATOLOGY/LYMPHOLOGY:  Negative for prolonged bleeding, bruising easily or swollen nodes.  81 mg aspirin  ENDO: Patient has a history of diabetes.  NEURO:   No history of headaches, syncope, paralysis, seizures or tremors.  All other reviewed and negative other than HPI.    OBJECTIVE:    /85   Pulse 64   Temp 97.5 °F (36.4 °C)   Ht 5' 5" (1.651 m)   Wt 110.8 kg (244 lb 4.3 oz)   LMP 06/26/2018   BMI 40.65 kg/m²     PHYSICAL " EXAMINATION:     GENERAL: Well appearing, in no acute distress, alert and oriented x3.   PSYCH:  Mood and affect appropriate.  SKIN: Skin color, texture, turgor normal, no rashes or lesions.  HEAD/FACE:  Normocephalic, atraumatic.   CV: RRR with palpation of the radial artery.  PULM: No evidence of respiratory difficulty, symmetric chest rise.  BACK: Negative SLR bilaterally. There is pain to palpation over the lumbar paraspinals bilaterally. There is pain with palpation over lumbar facet joints bilaterally. Limited ROM with pain on extension.  Positive facet loading bilaterally,  EXTREMITIES: There is mild pain with palpation to bilateral SI joints.  JENNA is negative bilaterally. Well-healed midline scars to bilateral knees.  Painful extension and flexion of bilateral knees. Medial and lateral joint line tenderness to bilateral knees.  MUSCULOSKELETAL: Bilateral upper and lower extremity strength is normal and symmetric.  No atrophy or tone abnormalities are noted.  NEURO: Bilateral lower extremity coordination and muscle stretch reflexes are physiologic and symmetric.  Plantar response are downgoing. No clonus.  No loss of sensation is noted.  GAIT: Antalgic- ambulates without assistance.        Assessment:       Encounter Diagnoses   Name Primary?    Chronic pain of both knees Yes    Status post total bilateral knee replacement     Lumbar spondylosis     DDD (degenerative disc disease), lumbar     Sacroiliac joint pain     Chronic pain syndrome     Encounter for monitoring opioid maintenance therapy          Plan:       - Previous imaging was reviewed and discussed with the patient today.     - Schedule for right then left genicular cooled RFA, one side at a time, two weeks apart.     - She is s/p bilateral SI joint injections with benefit. We can repeat this as needed.     - She may also benefit from repeat lumbar RFA.     - Continue Percocet 10/325 mg TID PRN. She does not need a refill today. She may  call for refills.     - The patient is here today for a refill of current pain medications and they believe these provide effective pain control and improvements in quality of life.  The patient notes no serious side effects, and feels the benefits outweigh the risks.  The patient was reminded of the pain contract that they signed previously as well as the risks and benefits of the medication including possible death.  The updated [place] Board  Pharmacy prescription monitoring program was reviewed, and the patient has been found to be compliant with current treatment plan.    - UDS from 2019 reviewed. Repeat UDS done today.     - RTC 3 weeks after above procedures.     - Dr. Wagner was consulted on the patient and agrees with this plan.    The above plan and management options were discussed at length with patient. Patient is in agreement with the above and verbalized understanding.     Adeola Robledo NP  09/23/2020

## 2020-09-23 NOTE — PROGRESS NOTES
Health Maintenance Due   Topic Date Due    Sign Pain Contract  12/07/1982    Complete Opioid Risk Tool  12/07/1982    Shingles Vaccine (1 of 2) 12/07/2014    Influenza Vaccine (1) 08/01/2020    Urine Microalbumin  09/11/2020     Updates were requested from care everywhere.  Chart was reviewed for overdue Proactive [facility] Encounters (DANIEL) topics (CRS, Breast Cancer Screening, Eye exam)  Health Maintenance has been updated.  LINKS immunization registry triggered.  Immunizations were reconciled.

## 2020-09-28 ENCOUNTER — PATIENT MESSAGE (OUTPATIENT)
Dept: RESEARCH | Facility: OTHER | Age: 55
End: 2020-09-28

## 2020-09-28 LAB

## 2020-09-29 ENCOUNTER — PATIENT MESSAGE (OUTPATIENT)
Dept: OTHER | Facility: OTHER | Age: 55
End: 2020-09-29

## 2020-09-30 ENCOUNTER — TELEPHONE (OUTPATIENT)
Dept: ADMINISTRATIVE | Facility: OTHER | Age: 55
End: 2020-09-30

## 2020-10-12 DIAGNOSIS — G89.4 CHRONIC PAIN DISORDER: ICD-10-CM

## 2020-10-12 DIAGNOSIS — Z79.891 ENCOUNTER FOR LONG-TERM OPIATE ANALGESIC USE: ICD-10-CM

## 2020-10-12 DIAGNOSIS — Z96.651 STATUS POST TOTAL RIGHT KNEE REPLACEMENT: ICD-10-CM

## 2020-10-12 DIAGNOSIS — M51.36 DDD (DEGENERATIVE DISC DISEASE), LUMBAR: ICD-10-CM

## 2020-10-12 DIAGNOSIS — M25.561 BILATERAL CHRONIC KNEE PAIN: ICD-10-CM

## 2020-10-12 DIAGNOSIS — M47.816 FACET ARTHRITIS OF LUMBAR REGION: Primary | ICD-10-CM

## 2020-10-12 DIAGNOSIS — M47.816 SPONDYLOSIS OF LUMBAR REGION WITHOUT MYELOPATHY OR RADICULOPATHY: ICD-10-CM

## 2020-10-12 DIAGNOSIS — M25.562 BILATERAL CHRONIC KNEE PAIN: ICD-10-CM

## 2020-10-12 DIAGNOSIS — G89.29 BILATERAL CHRONIC KNEE PAIN: ICD-10-CM

## 2020-10-12 DIAGNOSIS — M17.0 PRIMARY OSTEOARTHRITIS OF BOTH KNEES: ICD-10-CM

## 2020-10-12 RX ORDER — OXYCODONE AND ACETAMINOPHEN 10; 325 MG/1; MG/1
1 TABLET ORAL EVERY 8 HOURS PRN
Qty: 90 TABLET | Refills: 0 | Status: SHIPPED | OUTPATIENT
Start: 2020-10-12 | End: 2020-11-11

## 2020-10-12 NOTE — TELEPHONE ENCOUNTER
Patient was last seen in the office 09/23/20 by Adeola Robledo. This is a  patient. Patient last received this medication 09/12/20. Patient completed 09/23/20 UDS and was consistent. Patient has a pain contract on file. Patient has a follow up 11/12/20.

## 2020-10-12 NOTE — TELEPHONE ENCOUNTER
----- Message from Yoko Xavier sent at 10/12/2020  9:55 AM CDT -----  Can the clinic reply in [facility]: N      Please refill the medication(s) listed below. Please call the patient when the prescription(s) is ready for  at this phone number  [phone]      Medication #1 oxyCODONE-acetaminophen (PERCOCET)  mg per tablet      Preferred Pharmacy: [facility] - [address]

## 2020-10-13 ENCOUNTER — HOSPITAL ENCOUNTER (OUTPATIENT)
Facility: OTHER | Age: 55
Discharge: HOME | End: 2020-10-13
Attending: ANESTHESIOLOGY | Admitting: ANESTHESIOLOGY
Payer: MEDICARE

## 2020-10-13 VITALS
OXYGEN SATURATION: 99 % | SYSTOLIC BLOOD PRESSURE: 144 MMHG | HEART RATE: 78 BPM | DIASTOLIC BLOOD PRESSURE: 92 MMHG | HEIGHT: 65 IN | TEMPERATURE: 99 F | WEIGHT: 240 LBS | BODY MASS INDEX: 39.99 KG/M2 | RESPIRATION RATE: 16 BRPM

## 2020-10-13 DIAGNOSIS — G89.29 CHRONIC PAIN: ICD-10-CM

## 2020-10-13 DIAGNOSIS — G89.29 CHRONIC PAIN OF RIGHT KNEE: Primary | ICD-10-CM

## 2020-10-13 DIAGNOSIS — M25.561 CHRONIC PAIN OF RIGHT KNEE: Primary | ICD-10-CM

## 2020-10-13 PROCEDURE — A4649 SURGICAL SUPPLIES: HCPCS | Performed by: ANESTHESIOLOGY

## 2020-10-13 PROCEDURE — 64624 DSTRJ NULYT AGT GNCLR NRV: CPT | Mod: RT | Performed by: ANESTHESIOLOGY

## 2020-10-13 PROCEDURE — 63600175 PHARM REV CODE 636 W HCPCS: Performed by: ANESTHESIOLOGY

## 2020-10-13 PROCEDURE — 82947 ASSAY GLUCOSE BLOOD QUANT: CPT | Performed by: ANESTHESIOLOGY

## 2020-10-13 PROCEDURE — 25000003 PHARM REV CODE 250: Performed by: ANESTHESIOLOGY

## 2020-10-13 PROCEDURE — 64624 DSTRJ NULYT AGT GNCLR NRV: CPT | Mod: RT,,, | Performed by: ANESTHESIOLOGY

## 2020-10-13 PROCEDURE — 64624 PR DESTRUCT, NEUROLYTIC AGENT, GENICULAR NERVE, W/IMG: ICD-10-PCS | Mod: RT,,, | Performed by: ANESTHESIOLOGY

## 2020-10-13 RX ORDER — BUPIVACAINE HYDROCHLORIDE 2.5 MG/ML
INJECTION, SOLUTION EPIDURAL; INFILTRATION; INTRACAUDAL
Status: DISCONTINUED | OUTPATIENT
Start: 2020-10-13 | End: 2020-10-13 | Stop reason: HOSPADM

## 2020-10-13 RX ORDER — MIDAZOLAM HYDROCHLORIDE 1 MG/ML
INJECTION INTRAMUSCULAR; INTRAVENOUS
Status: DISCONTINUED | OUTPATIENT
Start: 2020-10-13 | End: 2020-10-13 | Stop reason: HOSPADM

## 2020-10-13 RX ORDER — FENTANYL CITRATE 50 UG/ML
INJECTION, SOLUTION INTRAMUSCULAR; INTRAVENOUS
Status: DISCONTINUED | OUTPATIENT
Start: 2020-10-13 | End: 2020-10-13 | Stop reason: HOSPADM

## 2020-10-13 RX ORDER — LIDOCAINE HYDROCHLORIDE 10 MG/ML
INJECTION INFILTRATION; PERINEURAL
Status: DISCONTINUED | OUTPATIENT
Start: 2020-10-13 | End: 2020-10-13 | Stop reason: HOSPADM

## 2020-10-13 RX ORDER — SODIUM CHLORIDE 9 MG/ML
500 INJECTION, SOLUTION INTRAVENOUS CONTINUOUS
Status: DISCONTINUED | OUTPATIENT
Start: 2020-10-13 | End: 2020-10-13 | Stop reason: HOSPADM

## 2020-10-13 RX ADMIN — SODIUM CHLORIDE 500 ML: 0.9 INJECTION, SOLUTION INTRAVENOUS at 09:10

## 2020-10-13 NOTE — PLAN OF CARE
PATIENT TOLERATED PROCEDURE WELL. PT COMPLAINS OF 3/10 PAIN. ASSISTED PATIENT UP FOR FIRST TIME. STEADY ON FEET AND DISCHARGE INSTRUCTIONS GIVEN.

## 2020-10-13 NOTE — DISCHARGE INSTRUCTIONS
Adult Procedural Sedation Instructions    Recovery After Procedural Sedation (Adult)  You have been given medicine by vein to make you sleep during your surgery. This may have included both a pain medicine and sleeping medicine. Most of the effects have worn off. But you may still have some drowsiness for the next 6 to 8 hours.  Home care  Follow these guidelines when you get home:  · For the next 8 hours, you should be watched by a responsible adult. This person should make sure your condition is not getting worse.  · Don't drink any alcohol for the next 24 hours.  · Don't drive, operate dangerous machinery, or make important business or personal decisions during the next 24 hours.  Note: Your healthcare provider may tell you not to take any medicine by mouth for pain or sleep in the next 4 hours. These medicines may react with the medicines you were given in the hospital. This could cause a much stronger response than usual.  Follow-up care  Follow up with your healthcare provider if you are not alert and back to your usual level of activity within 12 hours.  When to seek medical advice  Call your healthcare provider right away if any of these occur:  · Drowsiness gets worse  · Weakness or dizziness gets worse  · Repeated vomiting  · You can't be awakened   Date Last Reviewed: 10/18/2016  © 3015-9163 [employer]. [address] [place] [address], [place] [address]. All rights reserved. This information is not intended as a substitute for professional medical care. Always follow your healthcare professional's instructions.       Thank you for allowing us to care for you today. You may receive a survey about the care we provided. Your feedback is valuable and helps us provide excellent care throughout the community.     Home Care Instructions for Pain Management:    1. DIET:   You may resume your normal diet today.   2. BATHING:   You may shower with luke warm water. No tub baths or anything that will soak  injection sites under water for the next 24 hours.  3. DRESSING:   You may remove your bandage today.   4. ACTIVITY LEVEL:   You may resume your normal activities 24 hrs after your procedure. Nothing strenuous today.  5. MEDICATIONS:   You may resume your normal medications today. To restart blood thinners, ask your doctor.  6. DRIVING    If you have received any sedatives by mouth today, you may not drive for 12 hours.    If you have received any sedation through your IV, you may not drive for 24 hrs.   7. SPECIAL INSTRUCTIONS:   No heat to the injection site for 24 hrs including, hot bath or shower, heating pad, moist heat, or hot tubs.    Use ice pack to injection site for any pain or discomfort.  Apply ice packs for 20 minute intervals as needed.    IF you have diabetes, be sure to monitor your blood sugar more closely. IF your injection contained steroids your blood sugar levels may become higher than normal.    If you are still having pain upon discharge:  Your pain may improve over the next 48 hours. The anesthetic (numbing medication) works immediately to 48 hours. IF your injection contained a steroid (anti-inflammatory medication), it takes approximately 3 days to start feeling relief and 7-10 days to see your greatest results from the medication. It is possible you may need subsequent injections. This would be discussed at your follow up appointment with pain management or your referring doctor.    Please call the PAIN MANAGEMENT office at [phone] or ON CALL pager at [phone] if you experienced any:   -Weakness or loss of sensation  -Fever > 101.5  -Pain uncontrolled with oral medications   -Persistent nausea, vomiting, or diarrhea  -Redness or drainage from the injection sites, or any other worrisome concerns.   If physician on call was not reached or could not communicate with our office for any reason please go to the nearest emergency department.

## 2020-10-13 NOTE — OP NOTE
Procedure Note:    Right  Geniculate nerve cooled radiofrequency ablation under fluoroscopy     1) medial superior epicondyle geniculate nerve cooled radiofrequency ablation  2) lateral superior epicondyle geniculate nerve cooled radiofrequency ablation  3) medial tibial metaphysis geniculate nerve cooled radiofrequency ablation  4) xray guidance for needle placement  5) Conscious sedation provided by MD                                Surgeon: Lina Wagner M.D.    Assistant: None    Pre-Op Diagnosis:  Right  Chronic pain of right knee [M25.561, G89.29]    Post-Op Diagnosis: Chronic pain of right knee [M25.561, G89.29]    EBL: None    Complications: None    Specimens: None    Description of procedure:    After written consent was obtained, patient placed in supine position.  The area over the medial and lateral aspect of the superior epi-condyle of the femur and the medial tibial metaphysis were prepped with chlorhexidine.  The area was draped in the usual sterile fashion.  Approximately 3 mL total 1% lidocaine was infiltrated into the skin overlying the 3 predetermined entry points. A 17 gauge 10mm active tip needle was then advanced under fluoroscopy in the AP and lateral views into the positions of the geniculate nerves at these levels. This was followed by motor testing at each of the nerves to ensure that there was no dorsiflexion of the foot.  After negative aspiration and no paresthesias there was injection of 2.5 mL of 0.25% bupivacaine into each of these  3 areas for a total volume of 6 mL of 0.25% bupivacaine. This was followed by cooled thermal lesioning at 80 degrees celsius for 150 seconds at each site.   Needle was withdrawn and a sterile band-aid applied to the skin.    CConscious sedation provided by M.D    The patient was monitored with continuous pulse oximetry, EKG, and intermittent blood pressure monitors.  The patient was hemodynamically stable throughout the entire process was responsive to  voice, and breathing spontaneously.  Supplemental O2 was provided at 2L/min via nasal cannula.  Patient was comfortable for the duration of the procedure. (See nurse documentation and case log for sedation time)    There was a total of 2mg IV Midazolam and 100mcg Fentanyl titrated for the procedure      The patient was monitored after the procedure.   They were given post-procedure and discharge instructions to follow at home.  The patient was discharged in a stable condition.

## 2020-10-13 NOTE — DISCHARGE SUMMARY
Discharge Note  Short Stay      SUMMARY     Admit Date: 10/13/2020    Attending Physician: Lina Wagner      Discharge Physician: Lina Wagner      Discharge Date: 10/13/2020 11:09 AM    Procedure(s) (LRB):  RADIOFREQUENCY ABLATION, Genicular Cooled 1 of 2 (Right)    Final Diagnosis: Chronic pain of right knee [M25.561, G89.29]    Disposition: Home or self care    Patient Instructions:   Current Discharge Medication List      CONTINUE these medications which have NOT CHANGED    Details   albuterol (VENTOLIN HFA) 90 mcg/actuation inhaler INHALE TWO PUFFS INTO LUNGS EVERY 4 TO 6 HOURS AS NEEDED FOR SHORTNESS OF BREATH AND FOR WHEEZING  Qty: 18 each, Refills: 0    Comments: Please consider 90 day supplies to promote better adherence  Associated Diagnoses: Asthma, well controlled, mild intermittent      allopurinoL (ZYLOPRIM) 300 MG tablet Take 1 tablet (300 mg total) by mouth 2 (two) times daily.  Qty: 180 tablet, Refills: 3      atorvastatin (LIPITOR) 20 MG tablet Take 1 tablet (20 mg total) by mouth once daily.  Qty: 90 tablet, Refills: 3      b complex vitamins tablet Take 1 tablet by mouth every other day.       calcium citrate/vitamin D2 (ISIAH-CITRATE ORAL) Take 500 mg by mouth 2 (two) times daily.      cyanocobalamin (VITAMIN B-12) 1000 MCG tablet Take 100 mcg by mouth every morning.      FLUoxetine (PROZAC) 20 mg/5 mL (4 mg/mL) solution Take 10 mLs (40 mg total) by mouth once daily.  Qty: 300 mL, Refills: 6      fluticasone (FLONASE) 50 mcg/actuation nasal spray 1 spray by Each Nare route 2 (two) times daily as needed for Rhinitis.  Qty: 15 g, Refills: 0      gabapentin (NEURONTIN) 300 MG capsule Take 1 pill QHS for 7 days then take 1 pill BID for 7 days then take pill TID  Qty: 90 capsule, Refills: 1    Associated Diagnoses: Subacromial bursitis of left shoulder joint; Myofascial pain; Chronic left shoulder pain; Cervical radiculopathy; Cervical spondylosis; DDD (degenerative disc disease), cervical;  Chronic pain disorder      levoFLOXacin (LEVAQUIN) 250 mg/10 mL Soln Take 20 mLs (500 mg total) by mouth once daily.  Qty: 200 mL, Refills: 0      levoFLOXacin (LEVAQUIN) 500 MG tablet Take 1 tablet (500 mg total) by mouth once daily.  Qty: 10 tablet, Refills: 0      MULTIVIT-IRON-MIN-FOLIC ACID 3,500-18-0.4 UNIT-MG-MG ORAL CHEW Take 1 tablet by mouth 2 (two) times daily.       omeprazole (PRILOSEC) 20 MG capsule TAKE 1 CAPSULE (20 MG TOTAL) BY MOUTH EVERY MORNING.  Qty: 90 capsule, Refills: 3    Associated Diagnoses: Gastroesophageal reflux disease without esophagitis      ondansetron (ZOFRAN) 4 MG tablet Take 1 tablet (4 mg total) by mouth every 6 (six) hours as needed.  Qty: 20 tablet, Refills: 0      oxyCODONE-acetaminophen (PERCOCET)  mg per tablet Take 1 tablet by mouth every 8 (eight) hours as needed for Pain. Greater than 7 day quantity medically necessary  Qty: 90 tablet, Refills: 0    Comments: Quantity prescribed more than 7 day supply? Yes, quantity medically necessary  Associated Diagnoses: Spondylosis of lumbar region without myelopathy or radiculopathy; Facet arthritis of lumbar region; DDD (degenerative disc disease), lumbar; Primary osteoarthritis of both knees; Bilateral chronic knee pain; Status post total right knee replacement; Chronic pain disorder; Encounter for long-term opiate analgesic use      vitamin D 185 MG Tab Take 5,000 mg by mouth every morning.                  Discharge Diagnosis: Chronic pain of right knee [M25.561, G89.29]  Condition on Discharge: Stable with no complications to procedure   Diet on Discharge: Same as before.  Activity: as per instruction sheet.  Discharge to: Home with a responsible adult.  Follow up: 2-4 weeks       Please call my office or pager at [phone] if experienced any weakness or loss of sensation, fever > 101.5, pain uncontrolled with oral medications, persistent nausea/vomiting/or diarrhea, redness or drainage from the incisions, or any  other worrisome concerns. If physician on call was not reached or could not communicate with our office for any reason please go to the nearest emergency department

## 2020-11-03 ENCOUNTER — HOSPITAL ENCOUNTER (OUTPATIENT)
Facility: OTHER | Age: 55
Discharge: HOME | End: 2020-11-03
Attending: ANESTHESIOLOGY | Admitting: ANESTHESIOLOGY
Payer: MEDICARE

## 2020-11-03 ENCOUNTER — PATIENT OUTREACH (OUTPATIENT)
Dept: ADMINISTRATIVE | Facility: HOSPITAL | Age: 55
End: 2020-11-03

## 2020-11-03 VITALS
OXYGEN SATURATION: 100 % | HEART RATE: 60 BPM | HEIGHT: 64 IN | BODY MASS INDEX: 40.97 KG/M2 | SYSTOLIC BLOOD PRESSURE: 118 MMHG | TEMPERATURE: 99 F | WEIGHT: 240 LBS | RESPIRATION RATE: 16 BRPM | DIASTOLIC BLOOD PRESSURE: 71 MMHG

## 2020-11-03 DIAGNOSIS — M17.0 PRIMARY OSTEOARTHRITIS OF BOTH KNEES: ICD-10-CM

## 2020-11-03 DIAGNOSIS — M25.562 CHRONIC PAIN OF BOTH KNEES: Primary | ICD-10-CM

## 2020-11-03 DIAGNOSIS — G89.29 CHRONIC PAIN: ICD-10-CM

## 2020-11-03 DIAGNOSIS — E11.65 UNCONTROLLED TYPE 2 DIABETES MELLITUS WITH HYPERGLYCEMIA: Primary | ICD-10-CM

## 2020-11-03 DIAGNOSIS — M25.561 CHRONIC PAIN OF BOTH KNEES: Primary | ICD-10-CM

## 2020-11-03 DIAGNOSIS — G89.29 CHRONIC PAIN OF BOTH KNEES: Primary | ICD-10-CM

## 2020-11-03 PROCEDURE — 64624 DSTRJ NULYT AGT GNCLR NRV: CPT | Mod: LT | Performed by: ANESTHESIOLOGY

## 2020-11-03 PROCEDURE — 63600175 PHARM REV CODE 636 W HCPCS: Performed by: ANESTHESIOLOGY

## 2020-11-03 PROCEDURE — 64624 DSTRJ NULYT AGT GNCLR NRV: CPT | Mod: LT,,, | Performed by: ANESTHESIOLOGY

## 2020-11-03 PROCEDURE — 25000003 PHARM REV CODE 250: Performed by: ANESTHESIOLOGY

## 2020-11-03 PROCEDURE — A4649 SURGICAL SUPPLIES: HCPCS | Performed by: ANESTHESIOLOGY

## 2020-11-03 PROCEDURE — 25000003 PHARM REV CODE 250: Performed by: STUDENT IN AN ORGANIZED HEALTH CARE EDUCATION/TRAINING PROGRAM

## 2020-11-03 PROCEDURE — 64624 PR DESTRUCT, NEUROLYTIC AGENT, GENICULAR NERVE, W/IMG: ICD-10-PCS | Mod: LT,,, | Performed by: ANESTHESIOLOGY

## 2020-11-03 RX ORDER — MIDAZOLAM HYDROCHLORIDE 1 MG/ML
INJECTION INTRAMUSCULAR; INTRAVENOUS
Status: DISCONTINUED | OUTPATIENT
Start: 2020-11-03 | End: 2020-11-03 | Stop reason: HOSPADM

## 2020-11-03 RX ORDER — BUPIVACAINE HYDROCHLORIDE 2.5 MG/ML
INJECTION, SOLUTION EPIDURAL; INFILTRATION; INTRACAUDAL
Status: DISCONTINUED | OUTPATIENT
Start: 2020-11-03 | End: 2020-11-03 | Stop reason: HOSPADM

## 2020-11-03 RX ORDER — SODIUM CHLORIDE 9 MG/ML
500 INJECTION, SOLUTION INTRAVENOUS CONTINUOUS
Status: DISCONTINUED | OUTPATIENT
Start: 2020-11-03 | End: 2020-11-03 | Stop reason: HOSPADM

## 2020-11-03 RX ORDER — LIDOCAINE HYDROCHLORIDE 10 MG/ML
INJECTION INFILTRATION; PERINEURAL
Status: DISCONTINUED | OUTPATIENT
Start: 2020-11-03 | End: 2020-11-03 | Stop reason: HOSPADM

## 2020-11-03 RX ORDER — FENTANYL CITRATE 50 UG/ML
INJECTION, SOLUTION INTRAMUSCULAR; INTRAVENOUS
Status: DISCONTINUED | OUTPATIENT
Start: 2020-11-03 | End: 2020-11-03 | Stop reason: HOSPADM

## 2020-11-03 RX ADMIN — SODIUM CHLORIDE 500 ML: 0.9 INJECTION, SOLUTION INTRAVENOUS at 08:11

## 2020-11-03 NOTE — DISCHARGE SUMMARY
Discharge Note  Short Stay      SUMMARY     Admit Date: 11/3/2020    Attending Physician: Lina Wagner      Discharge Physician: Lina Wagner      Discharge Date: 11/3/2020 9:40 AM    Procedure(s) (LRB):  RADIOFREQUENCY ABLATION, GENICULAR COOLED  2 OF 2 (Left)    Final Diagnosis: Chronic pain of left knee [M25.562, G89.29]    Disposition: Home or self care    Patient Instructions:   Current Discharge Medication List      CONTINUE these medications which have NOT CHANGED    Details   albuterol (VENTOLIN HFA) 90 mcg/actuation inhaler INHALE TWO PUFFS INTO LUNGS EVERY 4 TO 6 HOURS AS NEEDED FOR SHORTNESS OF BREATH AND FOR WHEEZING  Qty: 18 each, Refills: 0    Comments: Please consider 90 day supplies to promote better adherence  Associated Diagnoses: Asthma, well controlled, mild intermittent      allopurinoL (ZYLOPRIM) 300 MG tablet Take 1 tablet (300 mg total) by mouth 2 (two) times daily.  Qty: 180 tablet, Refills: 3      atorvastatin (LIPITOR) 20 MG tablet Take 1 tablet (20 mg total) by mouth once daily.  Qty: 90 tablet, Refills: 3      b complex vitamins tablet Take 1 tablet by mouth every other day.       calcium citrate/vitamin D2 (ISIAH-CITRATE ORAL) Take 500 mg by mouth 2 (two) times daily.      cyanocobalamin (VITAMIN B-12) 1000 MCG tablet Take 100 mcg by mouth every morning.      FLUoxetine (PROZAC) 20 mg/5 mL (4 mg/mL) solution Take 10 mLs (40 mg total) by mouth once daily.  Qty: 300 mL, Refills: 6      fluticasone (FLONASE) 50 mcg/actuation nasal spray 1 spray by Each Nare route 2 (two) times daily as needed for Rhinitis.  Qty: 15 g, Refills: 0      gabapentin (NEURONTIN) 300 MG capsule Take 1 pill QHS for 7 days then take 1 pill BID for 7 days then take pill TID  Qty: 90 capsule, Refills: 1    Associated Diagnoses: Subacromial bursitis of left shoulder joint; Myofascial pain; Chronic left shoulder pain; Cervical radiculopathy; Cervical spondylosis; DDD (degenerative disc disease), cervical; Chronic  pain disorder      levoFLOXacin (LEVAQUIN) 250 mg/10 mL Soln Take 20 mLs (500 mg total) by mouth once daily.  Qty: 200 mL, Refills: 0      levoFLOXacin (LEVAQUIN) 500 MG tablet Take 1 tablet (500 mg total) by mouth once daily.  Qty: 10 tablet, Refills: 0      MULTIVIT-IRON-MIN-FOLIC ACID 3,500-18-0.4 UNIT-MG-MG ORAL CHEW Take 1 tablet by mouth 2 (two) times daily.       omeprazole (PRILOSEC) 20 MG capsule TAKE 1 CAPSULE (20 MG TOTAL) BY MOUTH EVERY MORNING.  Qty: 90 capsule, Refills: 3    Associated Diagnoses: Gastroesophageal reflux disease without esophagitis      ondansetron (ZOFRAN) 4 MG tablet Take 1 tablet (4 mg total) by mouth every 6 (six) hours as needed.  Qty: 20 tablet, Refills: 0      oxyCODONE-acetaminophen (PERCOCET)  mg per tablet Take 1 tablet by mouth every 8 (eight) hours as needed for Pain. Greater than 7 day quantity medically necessary  Qty: 90 tablet, Refills: 0    Comments: Quantity prescribed more than 7 day supply? Yes, quantity medically necessary  Associated Diagnoses: Spondylosis of lumbar region without myelopathy or radiculopathy; Facet arthritis of lumbar region; DDD (degenerative disc disease), lumbar; Primary osteoarthritis of both knees; Bilateral chronic knee pain; Status post total right knee replacement; Chronic pain disorder; Encounter for long-term opiate analgesic use      vitamin D 185 MG Tab Take 5,000 mg by mouth every morning.                  Discharge Diagnosis: Chronic pain of left knee [M25.562, G89.29]  Condition on Discharge: Stable with no complications to procedure   Diet on Discharge: Same as before.  Activity: as per instruction sheet.  Discharge to: Home with a responsible adult.  Follow up: 2-4 weeks       Please call my office or pager at [phone] if experienced any weakness or loss of sensation, fever > 101.5, pain uncontrolled with oral medications, persistent nausea/vomiting/or diarrhea, redness or drainage from the incisions, or any other  worrisome concerns. If physician on call was not reached or could not communicate with our office for any reason please go to the nearest emergency department

## 2020-11-03 NOTE — OP NOTE
Procedure Note:    Left  Geniculate nerve cooled radiofrequency ablation under fluoroscopy     1) medial superior epicondyle geniculate nerve cooled radiofrequency ablation  2) lateral superior epicondyle geniculate nerve cooled radiofrequency ablation  3) medial tibial metaphysis geniculate nerve cooled radiofrequency ablation  4) xray guidance for needle placement  5) Conscious sedation provided by MD                                Surgeon: Lina Wagner M.D.    Assistant: None    Pre-Op Diagnosis:  Left  Chronic pain of left knee [M25.562, G89.29]    Post-Op Diagnosis: Chronic pain of left knee [M25.562, G89.29]    EBL: None    Complications: None    Specimens: None    Description of procedure:    After written consent was obtained, patient placed in supine position.  The area over the medial and lateral aspect of the superior epi-condyle of the femur and the medial tibial metaphysis were prepped with chlorhexidine.  The area was draped in the usual sterile fashion.  Approximately 3 mL total 1% lidocaine was infiltrated into the skin overlying the 3 predetermined entry points. A 17 gauge 10mm active tip needle was then advanced under fluoroscopy in the AP and lateral views into the positions of the geniculate nerves at these levels. This was followed by motor testing at each of the nerves to ensure that there was no dorsiflexion of the foot.  After negative aspiration and no paresthesias there was injection of 2.5 mL of 0.25% bupivacaine into each of these  3 areas for a total volume of 6 mL of 0.25% bupivacaine. This was followed by cooled thermal lesioning at 80 degrees celsius for 150 seconds at each site.   Needle was withdrawn and a sterile band-aid applied to the skin.    CConscious sedation provided by M.D    The patient was monitored with continuous pulse oximetry, EKG, and intermittent blood pressure monitors.  The patient was hemodynamically stable throughout the entire process was responsive to  voice, and breathing spontaneously.  Supplemental O2 was provided at 2L/min via nasal cannula.  Patient was comfortable for the duration of the procedure. (See nurse documentation and case log for sedation time)    There was a total of 2mg IV Midazolam and 100mcg Fentanyl titrated for the procedure      The patient was monitored after the procedure.   They were given post-procedure and discharge instructions to follow at home.  The patient was discharged in a stable condition.

## 2020-11-03 NOTE — DISCHARGE INSTRUCTIONS
Thank you for allowing us to care for you today. You may receive a survey about the care we provided. Your feedback is valuable and helps us provide excellent care throughout the community.     Home Care Instructions for Pain Management:    1. DIET:   You may resume your normal diet today.   2. BATHING:   You may shower with luke warm water. No tub baths or anything that will soak injection sites under water for the next 24 hours.  3. DRESSING:   You may remove your bandage today.   4. ACTIVITY LEVEL:   You may resume your normal activities 24 hrs after your procedure. Nothing strenuous today.  5. MEDICATIONS:   You may resume your normal medications today. To restart blood thinners, ask your doctor.  6. DRIVING    If you have received any sedatives by mouth today, you may not drive for 12 hours.    If you have received any sedation through your IV, you may not drive for 24 hrs.   7. SPECIAL INSTRUCTIONS:   No heat to the injection site for 24 hrs including, hot bath or shower, heating pad, moist heat, or hot tubs.    Use ice pack to injection site for any pain or discomfort.  Apply ice packs for 20 minute intervals as needed.    IF you have diabetes, be sure to monitor your blood sugar more closely. IF your injection contained steroids your blood sugar levels may become higher than normal.    If you are still having pain upon discharge:  Your pain may improve over the next 48 hours. The anesthetic (numbing medication) works immediately to 48 hours. IF your injection contained a steroid (anti-inflammatory medication), it takes approximately 3 days to start feeling relief and 7-10 days to see your greatest results from the medication. It is possible you may need subsequent injections. This would be discussed at your follow up appointment with pain management or your referring doctor.    Please call the PAIN MANAGEMENT office at [phone] or ON CALL pager at [phone] if you experienced any:   -Weakness or  loss of sensation  -Fever > 101.5  -Pain uncontrolled with oral medications   -Persistent nausea, vomiting, or diarrhea  -Redness or drainage from the injection sites, or any other worrisome concerns.   If physician on call was not reached or could not communicate with our office for any reason please go to the nearest emergency department.  Adult Procedural Sedation Instructions    Recovery After Procedural Sedation (Adult)  You have been given medicine by vein to make you sleep during your surgery. This may have included both a pain medicine and sleeping medicine. Most of the effects have worn off. But you may still have some drowsiness for the next 6 to 8 hours.  Home care  Follow these guidelines when you get home:  · For the next 8 hours, you should be watched by a responsible adult. This person should make sure your condition is not getting worse.  · Don't drink any alcohol for the next 24 hours.  · Don't drive, operate dangerous machinery, or make important business or personal decisions during the next 24 hours.  Note: Your healthcare provider may tell you not to take any medicine by mouth for pain or sleep in the next 4 hours. These medicines may react with the medicines you were given in the hospital. This could cause a much stronger response than usual.  Follow-up care  Follow up with your healthcare provider if you are not alert and back to your usual level of activity within 12 hours.  When to seek medical advice  Call your healthcare provider right away if any of these occur:  · Drowsiness gets worse  · Weakness or dizziness gets worse  · Repeated vomiting  · You can't be awakened   Date Last Reviewed: 10/18/2016  © 3257-5807 [employer]. [address] [place] [address], [place] [address]. All rights reserved. This information is not intended as a substitute for professional medical care. Always follow your healthcare professional's instructions.

## 2020-11-03 NOTE — H&P
HPI  Patient presenting for Procedure(s) (LRB):  RADIOFREQUENCY ABLATION, GENICULAR COOLED  2 OF 2 (Left)     Patient on Anti-coagulation No    No health changes since previous encounter    Past Medical History:   Diagnosis Date    Allergy     Anemia     Asthma     Bilateral shoulder bursitis     Cervical stenosis of spine     Chronic pain     DDD (degenerative disc disease), cervical     Depression 1/28/2019    Diabetes mellitus type II     DJD (degenerative joint disease) of knee 6/19/2014    Facet arthritis of lumbar region 12/17/2015    Facet syndrome 12/17/2015    GERD (gastroesophageal reflux disease)     Heartburn     Hyperlipidemia     Hypertension     Morbid obesity     Neuromuscular disorder     PADDY (obstructive sleep apnea)     Proteinuria     Right carpal tunnel syndrome     Sacroiliitis 6/13/2018    Sacroiliitis     Sleep apnea      Past Surgical History:   Procedure Laterality Date    CARPAL TUNNEL RELEASE      CARPAL TUNNEL RELEASE  1980s    left    CARPAL TUNNEL RELEASE  2012    right    COLONOSCOPY N/A 6/15/2020    Procedure: COLONOSCOPY;  Surgeon: Jc Koo MD;  Location: [facility] (4TH FLR);  Service: Endoscopy;  Laterality: N/A;  COVID screening on 6/13/20 at [facility]-  pt updated on drop off location and no visitor [facility]-    ESOPHAGOGASTRODUODENOSCOPY N/A 3/27/2019    Procedure: EGD (ESOPHAGOGASTRODUODENOSCOPY);  Surgeon: Robbin Bar MD;  Location: [facility] (2ND FLR);  Service: Endoscopy;  Laterality: N/A;  BMI 61.3/2nd floor case/svn    INJECTION OF JOINT Bilateral 6/9/2020    Procedure: INJECTION, JOINT, BILATERAL SI;  Surgeon: Lina Wagner MD;  Location[facility];  Service: Pain Management;  Laterality: Bilateral;  B/L SI Joint Injection    JOINT REPLACEMENT Bilateral     with 2 revisions on rt    KNEE SURGERY  3/2010    orthroscope    KNEE SURGERY  6-19-14    left TKR    LAPAROSCOPIC SLEEVE GASTRECTOMY N/A 8/28/2019     Procedure: GASTRECTOMY, SLEEVE, LAPAROSCOPIC with intraop EGD;  Surgeon: Heriberto Clements MD;  Location: [facility] OR 2ND FLR;  Service: General;  Laterality: N/A;    RADIOFREQUENCY ABLATION Right 7/9/2019    Procedure: RADIOFREQUENCY ABLATION;  Surgeon: Lina Wagner MD;  Location: [facility];  Service: Pain Management;  Laterality: Right;  RIGHT RFA L2,3,4,5  2 of 2    RADIOFREQUENCY ABLATION Left 12/19/2019    Procedure: RADIOFREQUENCY ABLATION, LEFT L2-L3-L4-L5 MEDIAL BRANCH 1 OF 2;  Surgeon: Lina Wagner MD;  Location: [facility];  Service: Pain Management;  Laterality: Left;    RADIOFREQUENCY ABLATION Right 12/31/2019    Procedure: RADIOFREQUENCY ABLATION, RIGHT L2-L3-L4-L5  2 OF 2;  Surgeon: Lina Wagner MD;  Location: [facility];  Service: Pain Management;  Laterality: Right;    RADIOFREQUENCY ABLATION Right 10/13/2020    Procedure: RADIOFREQUENCY ABLATION, Genicular Cooled 1 of 2;  Surgeon: Lina Wagner MD;  Location: [facility];  Service: Pain Management;  Laterality: Right;    RADIOFREQUENCY ABLATION OF LUMBAR MEDIAL BRANCH NERVE AT SINGLE LEVEL Left 6/26/2018    Procedure: RADIOFREQUENCY ABLATION, NERVE, MEDIAL BRANCH, LUMBAR, 1 LEVEL;  Surgeon: Lina Wagner MD;  Location: [facility];  Service: Pain Management;  Laterality: Left;  Left Cooled RFA @ L2,3,4,5  [id number]  with Sedation    1 of 2    RADIOFREQUENCY ABLATION OF LUMBAR MEDIAL BRANCH NERVE AT SINGLE LEVEL Right 7/10/2018    Procedure: RADIOFREQUENCY ABLATION, NERVE, MEDIAL BRANCH, LUMBAR, 1 LEVEL;  Surgeon: Lina Wagner MD;  Location: [facility];  Service: Pain Management;  Laterality: Right;  RIght Cooled RFA @ L2,3,4,5  [id number] with Sedation    2 of 2    TRIGGER FINGER RELEASE Right 2017    TRIGGER FINGER RELEASE Left 7/29/2019    Procedure: RELEASE, TRIGGER FINGER, Left Thumb;  Surgeon: Velma Garcia MD;  Location: [facility];  Service: Orthopedics;  Laterality: Left;   Local w/ MAC     Review of patient's allergies indicates:   Allergen Reactions    Strawberry Anaphylaxis      No current facility-administered medications for this encounter.        PMHx, PSHx, Allergies, Medications reviewed in epic    ROS negative except pain complaints in HPI    OBJECTIVE:    LMP 06/26/2018     PHYSICAL EXAMINATION:    GENERAL: Well appearing, in no acute distress, alert and oriented x3.  PSYCH:  Mood and affect appropriate.  SKIN: Skin color, texture, turgor normal, no rashes or lesions which will impact the procedure.  CV: RRR with palpation of the radial artery.  PULM: No evidence of respiratory difficulty, symmetric chest rise. Clear to auscultation.  NEURO: Cranial nerves grossly intact.    Plan:    Proceed with procedure as planned Procedure(s) (LRB):  RADIOFREQUENCY ABLATION, GENICULAR COOLED  2 OF 2 (Left)    Educated patient on surgery and anesthesia.     William Menard MD  11/03/2020

## 2020-11-04 ENCOUNTER — TELEPHONE (OUTPATIENT)
Dept: BARIATRICS | Facility: CLINIC | Age: 55
End: 2020-11-04

## 2020-11-04 ENCOUNTER — OFFICE VISIT (OUTPATIENT)
Dept: INTERNAL MEDICINE | Facility: CLINIC | Age: 55
End: 2020-11-04
Payer: MEDICARE

## 2020-11-04 ENCOUNTER — IMMUNIZATION (OUTPATIENT)
Dept: INTERNAL MEDICINE | Facility: CLINIC | Age: 55
End: 2020-11-04
Payer: MEDICARE

## 2020-11-04 VITALS
WEIGHT: 246.06 LBS | HEART RATE: 76 BPM | DIASTOLIC BLOOD PRESSURE: 62 MMHG | OXYGEN SATURATION: 99 % | SYSTOLIC BLOOD PRESSURE: 114 MMHG | BODY MASS INDEX: 41 KG/M2 | HEIGHT: 65 IN

## 2020-11-04 DIAGNOSIS — E78.5 HYPERLIPIDEMIA, UNSPECIFIED HYPERLIPIDEMIA TYPE: ICD-10-CM

## 2020-11-04 DIAGNOSIS — N39.0 URINARY TRACT INFECTION WITHOUT HEMATURIA, SITE UNSPECIFIED: ICD-10-CM

## 2020-11-04 DIAGNOSIS — K21.9 GASTROESOPHAGEAL REFLUX DISEASE WITHOUT ESOPHAGITIS: ICD-10-CM

## 2020-11-04 DIAGNOSIS — M89.9 DISORDER OF BONE: ICD-10-CM

## 2020-11-04 DIAGNOSIS — M10.9 GOUT, UNSPECIFIED CAUSE, UNSPECIFIED CHRONICITY, UNSPECIFIED SITE: ICD-10-CM

## 2020-11-04 DIAGNOSIS — D52.0 DIETARY FOLATE DEFICIENCY ANEMIA: ICD-10-CM

## 2020-11-04 DIAGNOSIS — D64.9 ANEMIA, UNSPECIFIED TYPE: ICD-10-CM

## 2020-11-04 DIAGNOSIS — E11.65 UNCONTROLLED TYPE 2 DIABETES MELLITUS WITH HYPERGLYCEMIA: Primary | ICD-10-CM

## 2020-11-04 DIAGNOSIS — E66.01 MORBID OBESITY: ICD-10-CM

## 2020-11-04 LAB
ALBUMIN/CREAT UR: 3.5 UG/MG (ref 0–30)
BACTERIA #/AREA URNS AUTO: NORMAL /HPF
BILIRUB UR QL STRIP: NEGATIVE
CLARITY UR REFRACT.AUTO: ABNORMAL
COLOR UR AUTO: YELLOW
CREAT UR-MCNC: 113 MG/DL (ref 15–325)
GLUCOSE UR QL STRIP: NEGATIVE
HGB UR QL STRIP: NEGATIVE
KETONES UR QL STRIP: NEGATIVE
LEUKOCYTE ESTERASE UR QL STRIP: NEGATIVE
MICROALBUMIN UR DL<=1MG/L-MCNC: 4 UG/ML
MICROSCOPIC COMMENT: NORMAL
NITRITE UR QL STRIP: NEGATIVE
PH UR STRIP: 5 [PH] (ref 5–8)
PROT UR QL STRIP: NEGATIVE
RBC #/AREA URNS AUTO: 0 /HPF (ref 0–4)
SP GR UR STRIP: 1.02 (ref 1–1.03)
SQUAMOUS #/AREA URNS AUTO: 0 /HPF
URN SPEC COLLECT METH UR: ABNORMAL
WBC #/AREA URNS AUTO: 1 /HPF (ref 0–5)

## 2020-11-04 PROCEDURE — 99214 OFFICE O/P EST MOD 30 MIN: CPT | Mod: PBBFAC,25 | Performed by: INTERNAL MEDICINE

## 2020-11-04 PROCEDURE — 87086 URINE CULTURE/COLONY COUNT: CPT

## 2020-11-04 PROCEDURE — 99999 PR PBB SHADOW E&M-EST. PATIENT-LVL IV: ICD-10-PCS | Mod: PBBFAC,,, | Performed by: INTERNAL MEDICINE

## 2020-11-04 PROCEDURE — 81001 URINALYSIS AUTO W/SCOPE: CPT

## 2020-11-04 PROCEDURE — 99999 PR PBB SHADOW E&M-EST. PATIENT-LVL IV: CPT | Mod: PBBFAC,,, | Performed by: INTERNAL MEDICINE

## 2020-11-04 PROCEDURE — 82043 UR ALBUMIN QUANTITATIVE: CPT

## 2020-11-04 PROCEDURE — 99214 OFFICE O/P EST MOD 30 MIN: CPT | Mod: S$PBB,,, | Performed by: INTERNAL MEDICINE

## 2020-11-04 PROCEDURE — 99214 PR OFFICE/OUTPT VISIT, EST, LEVL IV, 30-39 MIN: ICD-10-PCS | Mod: S$PBB,,, | Performed by: INTERNAL MEDICINE

## 2020-11-04 PROCEDURE — 90686 IIV4 VACC NO PRSV 0.5 ML IM: CPT | Mod: PBBFAC

## 2020-11-04 RX ORDER — ATORVASTATIN CALCIUM 20 MG/1
20 TABLET, FILM COATED ORAL DAILY
Qty: 90 TABLET | Refills: 3 | Status: SHIPPED | OUTPATIENT
Start: 2020-11-04 | End: 2021-11-08 | Stop reason: SDUPTHER

## 2020-11-04 RX ORDER — OMEPRAZOLE 20 MG/1
20 CAPSULE, DELAYED RELEASE ORAL EVERY MORNING
Qty: 90 CAPSULE | Refills: 3 | Status: SHIPPED | OUTPATIENT
Start: 2020-11-04 | End: 2021-11-08 | Stop reason: SDUPTHER

## 2020-11-04 RX ORDER — ALLOPURINOL 300 MG/1
300 TABLET ORAL 2 TIMES DAILY
Qty: 180 TABLET | Refills: 3 | Status: SHIPPED | OUTPATIENT
Start: 2020-11-04 | End: 2021-07-12 | Stop reason: SDUPTHER

## 2020-11-04 NOTE — TELEPHONE ENCOUNTER
----- Message from González Fischer sent at 11/4/2020 10:38 AM CST -----  Contact: pt  Please call pt at [phone]    Patient is requesting an appt for excessive skin (nothing available)    Thank you

## 2020-11-04 NOTE — TELEPHONE ENCOUNTER
----- Message from Selina Ivy sent at 11/4/2020  3:46 PM CST -----  Pt# [phone]    Returning call

## 2020-11-04 NOTE — PATIENT INSTRUCTIONS
5 ml daily of fluoxetine for 2 weeks   Then 2.5 ml daily for 2 weeks then   2.5 ml every other day for 2 weeks      Shingrix - new shingles vaccine - check with insurance

## 2020-11-04 NOTE — TELEPHONE ENCOUNTER
Spoke with pt, explained we could submit a referral for skin removal surgery once she was 1.5 to 2 yrs post op and her weight has stabilized. Pt reports she is still losing wt and feeling great. Instructed to follow up with us at 2 yrs post op and reach out to us before if needed. Patient verbalized understanding.

## 2020-11-04 NOTE — PROGRESS NOTES
Subjective:       Patient ID: Alivia Thomas is a 54 y.o. female.    Chief Complaint: Follow-up    HPIPt is feeling well since her gastric bypass.  No CP or SOB.  Knees and back still painful.  No recent gout.  Wants to wean off fluoxetine.   Review of Systems   Respiratory: Negative for shortness of breath (PND or orthopnea).    Cardiovascular: Negative for chest pain (arm pain or jaw pain).   Gastrointestinal: Negative for abdominal pain, diarrhea, nausea and vomiting.   Genitourinary: Negative for dysuria.   Neurological: Negative for seizures, syncope and headaches.       Objective:      Physical Exam  Constitutional:       General: She is not in acute distress.     Appearance: She is well-developed.   HENT:      Head: Normocephalic.   Neck:      Musculoskeletal: Neck supple.      Thyroid: No thyromegaly.      Vascular: No JVD.   Cardiovascular:      Rate and Rhythm: Normal rate and regular rhythm.      Heart sounds: Normal heart sounds. No murmur. No friction rub. No gallop.    Pulmonary:      Effort: Pulmonary effort is normal.      Breath sounds: Normal breath sounds. No wheezing or rales.   Abdominal:      General: Bowel sounds are normal. There is no distension.      Palpations: Abdomen is soft. There is no mass.      Tenderness: There is no abdominal tenderness. There is no guarding or rebound.   Lymphadenopathy:      Cervical: No cervical adenopathy.   Skin:     General: Skin is warm and dry.   Neurological:      Mental Status: She is alert and oriented to person, place, and time.      Deep Tendon Reflexes: Reflexes are normal and symmetric.   Psychiatric:         Behavior: Behavior normal.         Thought Content: Thought content normal.         Judgment: Judgment normal.         Assessment:       1. Uncontrolled type 2 diabetes mellitus with hyperglycemia    2. Gastroesophageal reflux disease without esophagitis    3. Morbid obesity    4. Hyperlipidemia, unspecified hyperlipidemia type    5. Disorder  of bone    6. Anemia, unspecified type    7. Gout, unspecified cause, unspecified chronicity, unspecified site    8. Urinary tract infection without hematuria, site unspecified    9. Dietary folate deficiency anemia         Plan:   Uncontrolled type 2 diabetes mellitus with hyperglycemia  -     Ambulatory referral/consult to Podiatry; Future; Expected date: 11/11/2020  -     CBC Auto Differential; Future; Expected date: 11/04/2020  -     Comprehensive Metabolic Panel; Future; Expected date: 11/04/2020  -     TSH; Future; Expected date: 11/04/2020  -     Hemoglobin A1C; Future; Expected date: 11/04/2020  -     Microalbumin/Creatinine Ratio, Urine    Gastroesophageal reflux disease without esophagitis  -     omeprazole (PRILOSEC) 20 MG capsule; Take 1 capsule (20 mg total) by mouth every morning.  Dispense: 90 capsule; Refill: 3    Morbid obesity  -     Ambulatory referral/consult to Bariatric Surgery; Future; Expected date: 11/11/2020    Hyperlipidemia, unspecified hyperlipidemia type  -     Lipid Panel; Future; Expected date: 11/04/2020    Disorder of bone  -     Vitamin D; Future; Expected date: 11/04/2020    Anemia, unspecified type  -     VITAMIN B1; Future; Expected date: 11/04/2020  -     Vitamin B12; Future; Expected date: 12/04/2020  -     Methylmalonic Acid, Serum; Future; Expected date: 11/04/2020  -     Iron and TIBC; Future; Expected date: 11/04/2020  -     Ferritin; Future; Expected date: 11/04/2020  -     VITAMIN B6; Future; Expected date: 11/04/2020    Gout, unspecified cause, unspecified chronicity, unspecified site  -     Uric Acid; Future; Expected date: 11/04/2020    Urinary tract infection without hematuria, site unspecified  -     Urinalysis  -     Urine culture    Dietary folate deficiency anemia   -     Vitamin B12; Future; Expected date: 12/04/2020    Other orders  -     allopurinoL (ZYLOPRIM) 300 MG tablet; Take 1 tablet (300 mg total) by mouth 2 (two) times daily.  Dispense: 180 tablet;  Refill: 3  -     atorvastatin (LIPITOR) 20 MG tablet; Take 1 tablet (20 mg total) by mouth once daily.  Dispense: 90 tablet; Refill: 3  -     Urinalysis Microscopic

## 2020-11-05 ENCOUNTER — LAB VISIT (OUTPATIENT)
Dept: LAB | Facility: HOSPITAL | Age: 55
End: 2020-11-05
Attending: INTERNAL MEDICINE
Payer: MEDICARE

## 2020-11-05 DIAGNOSIS — E11.65 UNCONTROLLED TYPE 2 DIABETES MELLITUS WITH HYPERGLYCEMIA: ICD-10-CM

## 2020-11-05 DIAGNOSIS — M89.9 DISORDER OF BONE: ICD-10-CM

## 2020-11-05 DIAGNOSIS — M10.9 GOUT, UNSPECIFIED CAUSE, UNSPECIFIED CHRONICITY, UNSPECIFIED SITE: ICD-10-CM

## 2020-11-05 DIAGNOSIS — E78.5 HYPERLIPIDEMIA, UNSPECIFIED HYPERLIPIDEMIA TYPE: ICD-10-CM

## 2020-11-05 DIAGNOSIS — D52.0 DIETARY FOLATE DEFICIENCY ANEMIA: ICD-10-CM

## 2020-11-05 DIAGNOSIS — D64.9 ANEMIA, UNSPECIFIED TYPE: ICD-10-CM

## 2020-11-05 LAB
25(OH)D3+25(OH)D2 SERPL-MCNC: 36 NG/ML (ref 30–96)
ALBUMIN SERPL BCP-MCNC: 3.1 G/DL (ref 3.5–5.2)
ALP SERPL-CCNC: 43 U/L (ref 55–135)
ALT SERPL W/O P-5'-P-CCNC: 15 U/L (ref 10–44)
ANION GAP SERPL CALC-SCNC: 6 MMOL/L (ref 8–16)
AST SERPL-CCNC: 15 U/L (ref 10–40)
BACTERIA UR CULT: NO GROWTH
BASOPHILS # BLD AUTO: 0.03 K/UL (ref 0–0.2)
BASOPHILS NFR BLD: 0.6 % (ref 0–1.9)
BILIRUB SERPL-MCNC: 0.5 MG/DL (ref 0.1–1)
BUN SERPL-MCNC: 16 MG/DL (ref 6–20)
CALCIUM SERPL-MCNC: 8.7 MG/DL (ref 8.7–10.5)
CHLORIDE SERPL-SCNC: 109 MMOL/L (ref 95–110)
CHOLEST SERPL-MCNC: 134 MG/DL (ref 120–199)
CHOLEST/HDLC SERPL: 2.5 {RATIO} (ref 2–5)
CO2 SERPL-SCNC: 27 MMOL/L (ref 23–29)
CREAT SERPL-MCNC: 0.7 MG/DL (ref 0.5–1.4)
DIFFERENTIAL METHOD: ABNORMAL
EOSINOPHIL # BLD AUTO: 0.1 K/UL (ref 0–0.5)
EOSINOPHIL NFR BLD: 2.8 % (ref 0–8)
ERYTHROCYTE [DISTWIDTH] IN BLOOD BY AUTOMATED COUNT: 13.9 % (ref 11.5–14.5)
EST. GFR  (AFRICAN AMERICAN): >60 ML/MIN/1.73 M^2
EST. GFR  (NON AFRICAN AMERICAN): >60 ML/MIN/1.73 M^2
ESTIMATED AVG GLUCOSE: 128 MG/DL (ref 68–131)
FERRITIN SERPL-MCNC: 18 NG/ML (ref 20–300)
GLUCOSE SERPL-MCNC: 116 MG/DL (ref 70–110)
HBA1C MFR BLD HPLC: 6.1 % (ref 4–5.6)
HCT VFR BLD AUTO: 40.8 % (ref 37–48.5)
HDLC SERPL-MCNC: 54 MG/DL (ref 40–75)
HDLC SERPL: 40.3 % (ref 20–50)
HGB BLD-MCNC: 12.6 G/DL (ref 12–16)
IMM GRANULOCYTES # BLD AUTO: 0.01 K/UL (ref 0–0.04)
IMM GRANULOCYTES NFR BLD AUTO: 0.2 % (ref 0–0.5)
IRON SERPL-MCNC: 108 UG/DL (ref 30–160)
LDLC SERPL CALC-MCNC: 67.6 MG/DL (ref 63–159)
LYMPHOCYTES # BLD AUTO: 2.2 K/UL (ref 1–4.8)
LYMPHOCYTES NFR BLD: 44.2 % (ref 18–48)
MCH RBC QN AUTO: 30.7 PG (ref 27–31)
MCHC RBC AUTO-ENTMCNC: 30.9 G/DL (ref 32–36)
MCV RBC AUTO: 100 FL (ref 82–98)
MONOCYTES # BLD AUTO: 0.4 K/UL (ref 0.3–1)
MONOCYTES NFR BLD: 7.8 % (ref 4–15)
NEUTROPHILS # BLD AUTO: 2.2 K/UL (ref 1.8–7.7)
NEUTROPHILS NFR BLD: 44.4 % (ref 38–73)
NONHDLC SERPL-MCNC: 80 MG/DL
NRBC BLD-RTO: 0 /100 WBC
PLATELET # BLD AUTO: 224 K/UL (ref 150–350)
PMV BLD AUTO: 12.3 FL (ref 9.2–12.9)
POTASSIUM SERPL-SCNC: 3.9 MMOL/L (ref 3.5–5.1)
PROT SERPL-MCNC: 5.8 G/DL (ref 6–8.4)
RBC # BLD AUTO: 4.1 M/UL (ref 4–5.4)
SATURATED IRON: 29 % (ref 20–50)
SODIUM SERPL-SCNC: 142 MMOL/L (ref 136–145)
TOTAL IRON BINDING CAPACITY: 374 UG/DL (ref 250–450)
TRANSFERRIN SERPL-MCNC: 253 MG/DL (ref 200–375)
TRIGL SERPL-MCNC: 62 MG/DL (ref 30–150)
TSH SERPL DL<=0.005 MIU/L-ACNC: 2.16 UIU/ML (ref 0.4–4)
URATE SERPL-MCNC: 3 MG/DL (ref 2.4–5.7)
VIT B12 SERPL-MCNC: >2000 PG/ML (ref 210–950)
WBC # BLD AUTO: 5.02 K/UL (ref 3.9–12.7)

## 2020-11-05 PROCEDURE — 80061 LIPID PANEL: CPT

## 2020-11-05 PROCEDURE — 36415 COLL VENOUS BLD VENIPUNCTURE: CPT | Mod: PN

## 2020-11-05 PROCEDURE — 82306 VITAMIN D 25 HYDROXY: CPT

## 2020-11-05 PROCEDURE — 84425 ASSAY OF VITAMIN B-1: CPT

## 2020-11-05 PROCEDURE — 80053 COMPREHEN METABOLIC PANEL: CPT

## 2020-11-05 PROCEDURE — 82607 VITAMIN B-12: CPT

## 2020-11-05 PROCEDURE — 84207 ASSAY OF VITAMIN B-6: CPT

## 2020-11-05 PROCEDURE — 83036 HEMOGLOBIN GLYCOSYLATED A1C: CPT

## 2020-11-05 PROCEDURE — 84550 ASSAY OF BLOOD/URIC ACID: CPT

## 2020-11-05 PROCEDURE — 82728 ASSAY OF FERRITIN: CPT

## 2020-11-05 PROCEDURE — 83540 ASSAY OF IRON: CPT

## 2020-11-05 PROCEDURE — 84443 ASSAY THYROID STIM HORMONE: CPT

## 2020-11-05 PROCEDURE — 83921 ORGANIC ACID SINGLE QUANT: CPT

## 2020-11-05 PROCEDURE — 85025 COMPLETE CBC W/AUTO DIFF WBC: CPT

## 2020-11-10 LAB
METHYLMALONATE SERPL-SCNC: 0.12 UMOL/L
PYRIDOXAL SERPL-MCNC: 38 UG/L (ref 5–50)

## 2020-11-11 LAB — VIT B1 BLD-MCNC: 63 UG/L (ref 38–122)

## 2020-11-22 NOTE — PATIENT INSTRUCTIONS
High Protein Pureed Diet    2 weeks after gastric bypass and sleeve you may be ready to add pureed food to your diet.  All food should be the consistency of baby food, or thinner.  Follow pureed diet for the next 2 weeks.    Protein - It is very important to pay attention to protein intake during this time.      Inadequate protein intake can cause:  ? Delayed Wound Healing  ? Hair Loss  ? Muscle Breakdown    Meal Plan - Eat 3-4 meals per day (2-4 tbsp each), with protein supplements in between to meet protein needs.  Meeting protein needs daily will help increase healing, decrease muscle loss, and increase weight loss.  Your goal is  grams of protein a day.    Protein First - Always eat the foods with the highest protein first.  Foods high in protein include milk, yogurt, cheese, egg whites, and blenderized meat, seafood, and beans.    Fluids - Keep track in your journal of how much you are drinking; you should try to drink at least 64oz of fluids every day.      Foods allowed: Portion size Protein (g)   ? Sugar-free clear liquids As desired 0   ? Skim or 1% milk ½ cup 4   ? Sugar free pudding, light yogurt, custard (use skim or 1% milk in preparation) 3 oz 2.5   ? Strained baby food meats, or home-made pureed lean meats and shrimp 1 oz 7   ? Beans (red, white, black, lima, coughlin, fat free refried, hummus) and lentils ¼ cup 4   ? Low-fat/fat free cheese.(cottage cheese, mozzarella string cheese, ricotta cheese, Laughing Cow, Baby Bell, cheddar, etc) ¼ cup 7-8   ? Scrambled eggs or Egg Beaters 1 or ¼ cup 6   ? [place]ame or Tofu, mashed ¼ cup 5   ? Unflavored protein powder (add to 1 scoop to  98% fat free soups or SF pudding) 3 Tbsp 9   ? *PB2: peanut powder (45 calories) 2 Tbsp 5     *PB2 powdered peanut butter: 45 calories vs. 190 calories in 2 tbsp of regular peanut butter. Purchase online at [employer], or  at various [employer], [employer], [employer], [employer] and [employer].      Bariatric Liquid/Pureed Sample  Menu    3-4 small meals plus 2-3 protein drinks per day.    8am 1 egg or ¼ cup Egg Beaters   9am 1 cup water, or decaf coffee or tea   10am Protein drink, 30g protein   11am 2 tbsp low-fat cottage cheese, and 1 tbsp pureed peaches   12pm 1 cup water, or sugar-free lemonade    1pm 2 tbsp pureed chicken, and 1 tbsp pureed carrots    2pm 1 cup water, or sugar-free lemonade   3pm Protein drink, 30g protein   5pm 1 cup water    6pm 1 cup hi-protein creamy chicken soup 14g protein (see Recipe below)   7pm 1 cup water, or sugar-free fruit punch    8pm 1 cup water     This sample menu provides approx. 80g protein and 64oz fluids.  Liquid protein supplements should contain 20-30g protein and less than 4 grams of sugar each.    ? Sip fluids continuously in between meals.  Drink at least ¼ cup every 15 minutes.  ? For fluids: ¼ cup = 2 oz = 4 tbsp       RECIPE IDEAS for Bariatric Pureed Diet:    Hi-Protein Creamy Chicken Soup: (10g protein per 1 cup serving)  Empty 1 can of 98% fat free cream of chicken soup into saucepan. Then  blend 1 scoop of unflavored protein powder with 1 can of skim milk until smooth.  Add protein milk to saucepan and heat to warm. (Note: Do NOT boil. Protein powder may clump if heated too hot).     Hi-Protein Pudding: (14g protein per ½ cup serving)  Add 2 scoops protein powder to 2 cups cold skim milk and mix well.  Stir in dry Jell-O Sugar-Free Instant Pudding mix.  Chill and Enjoy!    Tuna Mousse (12g protein per ¼ cup serving) Page 135 in book Eating Well After Weight Loss Surgery.  In a  or , combine all ingredients and pulse until smooth.  2 6-ounce cans tuna packed in water, drained  2 tbsp low-fat mayonnaise  2 tbsp fat-free sour cream  2 tbsp fat-free cream cheese, softened  ½ cup shallots, finely chopped  1 tbsp lemon juice  ¼ tsp ground pepper  ½ tsp celery seed    Chocolate Peanut Butter Mousse  (28g protein total)  6oz plain Greek yogurt  4 tbsp chocolate PB2         Negative

## 2020-11-30 ENCOUNTER — PES CALL (OUTPATIENT)
Dept: ADMINISTRATIVE | Facility: OTHER | Age: 55
End: 2020-11-30

## 2020-12-01 ENCOUNTER — OFFICE VISIT (OUTPATIENT)
Dept: PAIN MEDICINE | Facility: CLINIC | Age: 55
End: 2020-12-01
Payer: MEDICARE

## 2020-12-01 VITALS
RESPIRATION RATE: 18 BRPM | WEIGHT: 245.38 LBS | BODY MASS INDEX: 40.88 KG/M2 | HEART RATE: 71 BPM | OXYGEN SATURATION: 100 % | DIASTOLIC BLOOD PRESSURE: 70 MMHG | SYSTOLIC BLOOD PRESSURE: 124 MMHG | HEIGHT: 65 IN

## 2020-12-01 DIAGNOSIS — M53.3 SACROILIAC JOINT PAIN: ICD-10-CM

## 2020-12-01 DIAGNOSIS — G89.29 CHRONIC PAIN OF BOTH KNEES: ICD-10-CM

## 2020-12-01 DIAGNOSIS — M25.562 CHRONIC PAIN OF BOTH KNEES: ICD-10-CM

## 2020-12-01 DIAGNOSIS — M25.561 CHRONIC PAIN OF BOTH KNEES: ICD-10-CM

## 2020-12-01 DIAGNOSIS — Z79.891 ENCOUNTER FOR MONITORING OPIOID MAINTENANCE THERAPY: ICD-10-CM

## 2020-12-01 DIAGNOSIS — G89.4 CHRONIC PAIN SYNDROME: Primary | ICD-10-CM

## 2020-12-01 DIAGNOSIS — Z96.653 STATUS POST TOTAL BILATERAL KNEE REPLACEMENT: ICD-10-CM

## 2020-12-01 DIAGNOSIS — Z51.81 ENCOUNTER FOR MONITORING OPIOID MAINTENANCE THERAPY: ICD-10-CM

## 2020-12-01 DIAGNOSIS — M47.816 LUMBAR SPONDYLOSIS: ICD-10-CM

## 2020-12-01 DIAGNOSIS — M51.36 DDD (DEGENERATIVE DISC DISEASE), LUMBAR: ICD-10-CM

## 2020-12-01 PROCEDURE — 99999 PR PBB SHADOW E&M-EST. PATIENT-LVL III: CPT | Mod: PBBFAC,,, | Performed by: NURSE PRACTITIONER

## 2020-12-01 PROCEDURE — 99214 OFFICE O/P EST MOD 30 MIN: CPT | Mod: S$PBB,,, | Performed by: NURSE PRACTITIONER

## 2020-12-01 PROCEDURE — 99213 OFFICE O/P EST LOW 20 MIN: CPT | Mod: PBBFAC | Performed by: NURSE PRACTITIONER

## 2020-12-01 PROCEDURE — 99214 PR OFFICE/OUTPT VISIT, EST, LEVL IV, 30-39 MIN: ICD-10-PCS | Mod: S$PBB,,, | Performed by: NURSE PRACTITIONER

## 2020-12-01 PROCEDURE — 99999 PR PBB SHADOW E&M-EST. PATIENT-LVL III: ICD-10-PCS | Mod: PBBFAC,,, | Performed by: NURSE PRACTITIONER

## 2020-12-01 RX ORDER — OXYCODONE AND ACETAMINOPHEN 10; 325 MG/1; MG/1
1 TABLET ORAL EVERY 8 HOURS PRN
Qty: 90 TABLET | Refills: 0 | Status: SHIPPED | OUTPATIENT
Start: 2020-12-11 | End: 2021-01-11

## 2020-12-01 NOTE — H&P (VIEW-ONLY)
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 continues to swim twice a week and is actively trying to lose weight and has been dieting.    Interval History 06/19/2015:  Patient presents in clinic for two month follow up. She reports her bilateral knee pain and low back pain is an 8/10. She currently takes celebrex and Percocet for pain and uses a topical cream.  She was recently evaluated by her orthopedist and the hardware all appears to be appropriately placed and the next follow-up is in 6 weeks.  The patient continues to work on weight loss and exercise and states that she has been doing more than in the past.   Patient reports no other health changes since previous encounter.    Interval History 04/09/2015:  Patient presents in clinic for one month follow up. She reports bilateral knee pain and low back pain is a 9/10 today. She currently takes percocet for pain as needed and uses a cane for ambulation. She states that the low back pain is new.  She continues to have bilateral knee pain and is in physical therapy.  She continues to take Celebrex daily and uses a topical pain cream regularly.    Patient reports no other health changes since previous encounter.    Interval history 3/5/2015:  Since previous encounter patient is status post right total knee replacement on 11/4/2014 and has been healed with postoperative visits showing good progress.  The patient  does have continued physical therapy sessions and has been attempting to lose weight and has lost approximately 25 pounds although her BMI continues to be 54.  She has been making good efforts to try and continue to increase her range of motion and lose weight.  She continues to have significant pain in bilateral knees and continues to use a cane for ambulation.  She was receiving oxycodone/acetaminophen 10/325 every 8 hours by mouth when necessary and requiring in order to persist in her physical therapy although the topical pain cream that she has been applying has been helping her to a limited degree she still requires the medication regularly.  Her recent x-ray imaging shows good positioning of the prosthesis.  No other health changes since previous encounter.     Interval history 2/17/2014:  Patient reports that she has been using the topical compounded cream on the knee approximately 4 times per day and she states that it does help her with her pain that she is experiencing.  She states that she has not gone to formal physical therapy but that she has been going to a pool that has exercise classes which is free for her and that she feels like it is helping her continue to lose weight.  Additionally she continues using hydrocodone/acetaminophen 7.5/750 approximately 3 times per day when necessary and states that also helps with her pain symptoms.  He she's still talks to have replacement for the left knee, but would like to lose weight further before going to that.  She has also had previous injections into her knees which have offered little to no relief in her pain symptoms.  She has had no other health changes since previous encounter.    Previous encounter 1/21/2014:  HPI Comments: 47 yo female presents for initial evaluation of bilateral knee pain, L>R. She is s/p arthroscopic right knee surgery and eventual replacement and then revision surgeries (Dr. Swan, Orthopedics). The pain is present in both  knees and is described as a terrible ache. She hears occasional popping in both knees with movement. The pain is worse with being on her feet and getting up from a sitting to standing position. Denies lower ext weakness or paresthesias. She does not have back pain or pain radiating down her legs. The pain is better with Celebrex and rest. She also takes Vicodin ES TID but this makes her very sleepy. She is not sure it helps with the pain because she usually falls asleep.     Physical Therapy: not since 2011 just prior to her 3rd right knee surgery (revision after replacement); has tried swimming which helps with weight loss    Non-pharmacologic Treatment: none    Pain Medications: Percocet and celebrex    Blood thinners: ASA 81 mg daily     Interventional Therapies:   steroid inj and visco-supplementation (series of 3) in left knee- not helpful  3/31/14 Bilateral genicular nerve blocks  2/16/16 Bilateral L2,3,4,5 MBB  3/1/16 Bilateral L2,3,4,5 MBB   4/6/16 Left L2,3,4,5 RFA- significant relief  4/20/16 Right L2,3,4,5 RFA- significant relief  10/5/16 Left L2,3,4,5 cooled RFA  10/19/16 Right L2,3,4,5 cooled RFA  4/26/17 Left L2,3,4,5 cooled RFA  5/9/17 Right L2,3,4,5 cooled RFA  12/26/2017- Left L2,3,4,5 cooled RFA  1/9/2018- Right L2,3,4,5 cooled RFA  6/26/18 Left L2,3,4,5 cooled RFA- 60% relief  7/10/18 Right L2,3,4,5 cooled RFA- 60% relief  9/28/18 TPIs- significant relief  12/27/18 Left L2,3,4,5 RFA- 70% relief  1/29/19 Right L2,3,4,5 RFA- 70% relief  6/18/19 Left L2,3,4,5 RFA- 70% relief  7/9/19 Right L2,3,4,5 RFA- 50% relief  12/19/19 Left L2,3,4,5 RFA- 80% relief  12/31/19 Right L2,3,4,5 RFA- 50% relief  3/2/2020- Right genicular nerve block- 70% relief for one month  3/12/20 Left subacromial bursa injection- 90% relief  5/18/2020- Left genicular nerve block- 70%  6/9/2020- Bilateral SI joint injections- 50% relief  10/13/2020- Right genicular RFA- 50% relief   11/3/2020- Left genicular RFA- 50%  relief    Relevant Surgeries: right knee surgery x3 (arthroscopic, then replacement and subsequent revision surgery), s/p left total knee arthroplasty    Relevant Imaging:  Xray Lumbar spine 09/21/2015  Lumbar spine radiograph    Comparison: None    Results: AP, lateral neutral, lateral flexion , lateral extension, bilateral oblique and spot views. The alignment of the lumbar spine demonstrates a mild levoscoliosis . 11-mm anterior listhesis of L4 relative to L5 with no translational abnormalities  seen on flexion and extension views. The vertebral body heights are well-maintained , mild disk space narrowing L4-L5 and L5-S1. Mild anterior and marginal osteophyte formation seen throughout the lumbar spine . The oblique views demonstrate no   definite spondylolysis. There is facet joint osseous hypertrophy noted at L3-L4 and L4-L5.      Impression         The Significant spondylosis of the lumbar spine with grade 1 anterior listhesis L4 relative to L5.  Facet joint osseous hypertrophy L3-L4 and L4-5.      Electronically signed by: BLESSING ROE MD  Date: 09/21/15  Time: 09:56          X-ray knee bilateral 8/26/2015:  Standing AP knees, lateral view of both knees and sunrise view of both patella. Study compared to May 2015. Postop change of bilateral knee replacement. The prosthetic components are in satisfactory position. Erosive changes involving the patella   again evident bilaterally.    Impression no significant change.      Xray Bilateral Knee 05/25/2015:  There are bilateral total knee arthroplasties with posterior resurfacing of the patella. As observed on 12/17/2014 there is a fracture of the left patella in the parasagittal plane.    Heterotopic bone is evident about each knee.    I detect no dislocation, unusual radiopaque retained foreign body, lytic or blastic lesion, or chondrocalcinosis.    Cervical MRI 4/24/2018:    Narrative     EXAMINATION:  MRI CERVICAL SPINE WITHOUT CONTRAST    CLINICAL  HISTORY:  Neck pain, first study;.  Cervicalgia.    TECHNIQUE:  Multiplanar, multisequence MR images of the cervical spine were acquired without the administration of contrast.    COMPARISON:  None.    FINDINGS:  There is reversal of the normal cervical lordosis which could be related to patient positioning versus muscle spasm.    Cervical vertebral body heights are well maintained without evidence of acute fracture or dislocation.  Marrow signal is unremarkable.    Spinal canal contents are unremarkable.  No abnormal masses or collections.    Individual levels as detailed below:    C2-3: No significant spinal canal stenosis or neuroforaminal narrowing.    C3-4: Facet arthropathy.  No significant spinal canal stenosis or neuroforaminal narrowing.    C4-5: Facet arthropathy.  Small broad-based disc osteophyte complex.  Mild right neuroforaminal narrowing.  Mild spinal canal stenosis.    C5-6: Facet arthropathy.  Uncovertebral joint spurring.  Broad-based posterior disc osteophyte complex.  Mild right neuroforaminal narrowing.  Mild spinal canal stenosis.    C6-7: Facet arthropathy.  No significant spinal canal stenosis or neuroforaminal narrowing.    C7-T1: No significant spinal canal stenosis or neuroforaminal narrowing.    Paraspinal muscles are unremarkable.  Soft tissues are intact.   Impression       Mild multilevel cervical spondylosis with mild spinal canal stenosis and mild right neuroforaminal narrowing at C4-5 and C5-6.       Lab Results   Component Value Date    HGBA1C 6.1 (H) 11/05/2020     Lab Results   Component Value Date    CHOL 134 11/05/2020    CHOL 152 03/04/2020    CHOL 160 01/02/2020     Lab Results   Component Value Date    HDL 54 11/05/2020    HDL 51 03/04/2020    HDL 49 01/02/2020     Lab Results   Component Value Date    LDLCALC 67.6 11/05/2020    LDLCALC 88.6 03/04/2020    LDLCALC 97.8 01/02/2020     Lab Results   Component Value Date    TRIG 62 11/05/2020    TRIG 62 03/04/2020    TRIG 66  01/02/2020     Lab Results   Component Value Date    CHOLHDL 40.3 11/05/2020    CHOLHDL 33.6 03/04/2020    CHOLHDL 30.6 01/02/2020         Past Medical History:   Diagnosis Date    Allergy     Anemia     Asthma     Bilateral shoulder bursitis     Cervical stenosis of spine     Chronic pain     DDD (degenerative disc disease), cervical     Depression 1/28/2019    Diabetes mellitus type II     DJD (degenerative joint disease) of knee 6/19/2014    Facet arthritis of lumbar region 12/17/2015    Facet syndrome 12/17/2015    GERD (gastroesophageal reflux disease)     Heartburn     Hyperlipidemia     Hypertension     Morbid obesity     Neuromuscular disorder     PADDY (obstructive sleep apnea)     Proteinuria     Right carpal tunnel syndrome     Sacroiliitis 6/13/2018    Sacroiliitis     Sleep apnea      Past Surgical History:   Procedure Laterality Date    CARPAL TUNNEL RELEASE      CARPAL TUNNEL RELEASE  1980s    left    CARPAL TUNNEL RELEASE  2012    right    COLONOSCOPY N/A 6/15/2020    Procedure: COLONOSCOPY;  Surgeon: Jc Koo MD;  Location: [facility] (4TH FLR);  Service: Endoscopy;  Laterality: N/A;  COVID screening on 6/13/20 at [facility]-rb  pt updated on drop off location and no visitor policy-rb    ESOPHAGOGASTRODUODENOSCOPY N/A 3/27/2019    Procedure: EGD (ESOPHAGOGASTRODUODENOSCOPY);  Surgeon: Robbin Bar MD;  Location: [facility] (2ND FLR);  Service: Endoscopy;  Laterality: N/A;  BMI 61.3/2nd floor case/svn    INJECTION OF JOINT Bilateral 6/9/2020    Procedure: INJECTION, JOINT, BILATERAL SI;  Surgeon: Lina Wagner MD;  [facility];  Service: Pain Management;  Laterality: Bilateral;  B/L SI Joint Injection    JOINT REPLACEMENT Bilateral     with 2 revisions on rt    KNEE SURGERY  3/2010    orthroscope    KNEE SURGERY  6-19-14    left TKR    LAPAROSCOPIC SLEEVE GASTRECTOMY N/A 8/28/2019    Procedure: GASTRECTOMY, SLEEVE, LAPAROSCOPIC with intraop  EGD;  Surgeon: Heriberto Clements MD;  Location: [facility] OR 2ND FLR;  Service: General;  Laterality: N/A;    RADIOFREQUENCY ABLATION Right 7/9/2019    Procedure: RADIOFREQUENCY ABLATION;  Surgeon: Lina Wagner MD;  Location: [facility] PAIN MGT;  Service: Pain Management;  Laterality: Right;  RIGHT RFA L2,3,4,5  2 of 2    RADIOFREQUENCY ABLATION Left 12/19/2019    Procedure: RADIOFREQUENCY ABLATION, LEFT L2-L3-L4-L5 MEDIAL BRANCH 1 OF 2;  Surgeon: Lina Wagner MD;  Location: [facility] PAIN MGT;  Service: Pain Management;  Laterality: Left;    RADIOFREQUENCY ABLATION Right 12/31/2019    Procedure: RADIOFREQUENCY ABLATION, RIGHT L2-L3-L4-L5  2 OF 2;  Surgeon: Lina Wagner MD;  Location: [facility] PAIN MGT;  Service: Pain Management;  Laterality: Right;    RADIOFREQUENCY ABLATION Right 10/13/2020    Procedure: RADIOFREQUENCY ABLATION, Genicular Cooled 1 of 2;  Surgeon: Lina Wagner MD;  Location: [facility] PAIN MGT;  Service: Pain Management;  Laterality: Right;    RADIOFREQUENCY ABLATION Left 11/3/2020    Procedure: RADIOFREQUENCY ABLATION, GENICULAR COOLED  2 OF 2;  Surgeon: Lina Wagner MD;  Location: [facility] PAIN MGT;  Service: Pain Management;  Laterality: Left;    RADIOFREQUENCY ABLATION OF LUMBAR MEDIAL BRANCH NERVE AT SINGLE LEVEL Left 6/26/2018    Procedure: RADIOFREQUENCY ABLATION, NERVE, MEDIAL BRANCH, LUMBAR, 1 LEVEL;  Surgeon: Lina Wagner MD;  Location: [facility] PAIN MGT;  Service: Pain Management;  Laterality: Left;  Left Cooled RFA @ L2,3,4,5  [id number]  with Sedation    1 of 2    RADIOFREQUENCY ABLATION OF LUMBAR MEDIAL BRANCH NERVE AT SINGLE LEVEL Right 7/10/2018    Procedure: RADIOFREQUENCY ABLATION, NERVE, MEDIAL BRANCH, LUMBAR, 1 LEVEL;  Surgeon: Lina Wagner MD;  Location: [facility] PAIN MGT;  Service: Pain Management;  Laterality: Right;  RIght Cooled RFA @ L2,3,4,5  [id number] with Sedation    2 of 2    TRIGGER FINGER RELEASE Right 2017    TRIGGER FINGER RELEASE Left  7/29/2019    Procedure: RELEASE, TRIGGER FINGER, Left Thumb;  Surgeon: Velma Garcia MD;  Location: [facility];  Service: Orthopedics;  Laterality: Left;  Local w/ MAC     Family History   Problem Relation Age of Onset    Diabetes Mother     Cataracts Mother     Diabetes Father     Cataracts Father     Coronary artery disease Brother     Diabetes Brother     Hypertension Sister     Diabetes Sister     No Known Problems Sister     Cancer Sister         lymphoma     Diabetes Brother     Hypotension Brother     Kidney failure Brother     Hypotension Brother     Amblyopia Neg Hx     Blindness Neg Hx     Glaucoma Neg Hx     Macular degeneration Neg Hx     Retinal detachment Neg Hx     Strabismus Neg Hx     Stroke Neg Hx     Thyroid disease Neg Hx      Social History     Socioeconomic History    Marital status:      Spouse name: Not on file    Number of children: Not on file    Years of education: Not on file    Highest education level: Not on file   Occupational History    Not on file   Social Needs    Financial resource strain: Not on file    Food insecurity     Worry: Not on file     Inability: Not on file    Transportation needs     Medical: Not on file     Non-medical: Not on file   Tobacco Use    Smoking status: Never Smoker    Smokeless tobacco: Never Used   Substance and Sexual Activity    Alcohol use: Yes     Comment: occasionally     Drug use: No    Sexual activity: Yes     Partners: Female     Birth control/protection: None   Lifestyle    Physical activity     Days per week: Not on file     Minutes per session: Not on file    Stress: Not on file   Relationships    Social connections     Talks on phone: Not on file     Gets together: Not on file     Attends [employer] service: Not on file     Active member of club or organization: Not on file     Attends meetings of clubs or organizations: Not on file     Relationship status: Not on file   Other Topics Concern     Not on file   Social History Narrative    Disabled. The patient is the youngest of 6 siblings. Single. Lives with single-sex partner.                  Review of patient's allergies indicates:  No Known Allergies    Medication List with Changes/Refills   Current Medications    ALBUTEROL (VENTOLIN HFA) 90 MCG/ACTUATION INHALER    INHALE TWO PUFFS INTO LUNGS EVERY 4 TO 6 HOURS AS NEEDED FOR SHORTNESS OF BREATH AND FOR WHEEZING    ALLOPURINOL (ZYLOPRIM) 300 MG TABLET    Take 1 tablet (300 mg total) by mouth 2 (two) times daily.    ATORVASTATIN (LIPITOR) 20 MG TABLET    Take 1 tablet (20 mg total) by mouth once daily.    B COMPLEX VITAMINS TABLET    Take 1 tablet by mouth every other day.     CALCIUM CITRATE/VITAMIN D2 (ISIAH-CITRATE ORAL)    Take 500 mg by mouth 2 (two) times daily.    CYANOCOBALAMIN (VITAMIN B-12) 1000 MCG TABLET    Take 100 mcg by mouth every morning.    FLUOXETINE (PROZAC) 20 MG/5 ML (4 MG/ML) SOLUTION    TAKE 10 MLS (40 MG TOTAL) BY MOUTH ONCE DAILY.    FLUTICASONE (FLONASE) 50 MCG/ACTUATION NASAL SPRAY    1 spray by Each Nare route 2 (two) times daily as needed for Rhinitis.    MULTIVIT-IRON-MIN-FOLIC ACID 3,500-18-0.4 UNIT-MG-MG ORAL CHEW    Take 1 tablet by mouth 2 (two) times daily.     OMEPRAZOLE (PRILOSEC) 20 MG CAPSULE    Take 1 capsule (20 mg total) by mouth every morning.    VITAMIN D 185 MG TAB    Take 5,000 mg by mouth every morning.          REVIEW OF SYSTEMS:    GENERAL:  Recent weight loss.  RESPIRATORY:  Negative for cough, wheezing or shortness of breath, patient denies any recent URI.  CARDIOVASCULAR:  Negative for chest pain, leg swelling or palpitations.  GI:  Negative for abdominal discomfort, blood in stools or black stools or change in bowel habits, occasional constipation.  MUSCULOSKELETAL:  See HPI.  SKIN:  Negative for lesions, rash, and itching.  PSYCH:  No mood disorder or recent psychosocial stressors.  Patient's sleep is disturbed secondary to pain (patient reports also  "that she has insomnia).  HEMATOLOGY/LYMPHOLOGY:  Negative for prolonged bleeding, bruising easily or swollen nodes.  81 mg aspirin  ENDO: Patient has a history of diabetes.  NEURO:   No history of headaches, syncope, paralysis, seizures or tremors.  All other reviewed and negative other than HPI.    OBJECTIVE:    /70   Pulse 71   Resp 18   Ht 5' 5" (1.651 m)   Wt 111.3 kg (245 lb 6 oz)   LMP 06/26/2018   SpO2 100%   BMI 40.83 kg/m²     PHYSICAL EXAMINATION:     GENERAL: Well appearing, in no acute distress, alert and oriented x3.   PSYCH:  Mood and affect appropriate.  SKIN: Skin color, texture, turgor normal, no rashes or lesions.  HEAD/FACE:  Normocephalic, atraumatic.   CV: RRR with palpation of the radial artery.  PULM: No evidence of respiratory difficulty, symmetric chest rise.  BACK: Negative SLR bilaterally. There is pain to palpation over the lumbar paraspinals bilaterally. There is pain with palpation over lumbar facet joints bilaterally. Limited ROM with pain on flexion and extension.  Positive facet loading bilaterally,  EXTREMITIES: There is mild pain with palpation to bilateral SI joints.  JENNA is negative bilaterally. Well-healed midline scars to bilateral knees.  Mild pain with extension of bilateral knees.   MUSCULOSKELETAL: Bilateral upper and lower extremity strength is normal and symmetric.  No atrophy or tone abnormalities are noted.  NEURO: Bilateral lower extremity coordination and muscle stretch reflexes are physiologic and symmetric.  Plantar response are downgoing. No clonus.  No loss of sensation is noted.  GAIT: Antalgic- ambulates without assistance.        Assessment:       Encounter Diagnoses   Name Primary?    Chronic pain syndrome Yes    Lumbar spondylosis     DDD (degenerative disc disease), lumbar     Sacroiliac joint pain     Chronic pain of both knees     Status post total bilateral knee replacement     Encounter for monitoring opioid maintenance therapy  "         Plan:       - Previous imaging was reviewed and discussed with the patient today.     - She is s/p bilateral genicular RFA with benefit. We can repeat this as needed.     - Schedule for left then right L2,3,4,5 RFA, one side at a time, two weeks apart.     - We can repeat SI joint injections as needed.     - Continue Percocet 10/325 mg TID PRN. Refill provided today with appropriate date.      - The patient is here today for a refill of current pain medications and they believe these provide effective pain control and improvements in quality of life.  The patient notes no serious side effects, and feels the benefits outweigh the risks.  The patient was reminded of the pain contract that they signed previously as well as the risks and benefits of the medication including possible death.  The updated [place] [employer] prescription monitoring program was reviewed, and the patient has been found to be compliant with current treatment plan. Medication management provided by Dr. Wagner.     - UDS from 9/23/2020 reviewed and consistent.     - RTC 3 weeks after above procedures.     - Dr. Wagner was consulted on the patient and agrees with this plan.    The above plan and management options were discussed at length with patient. Patient is in agreement with the above and verbalized understanding.     Adeola Robledo NP  12/01/2020

## 2020-12-01 NOTE — H&P (VIEW-ONLY)
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 continues to swim twice a week and is actively trying to lose weight and has been dieting.    Interval History 06/19/2015:  Patient presents in clinic for two month follow up. She reports her bilateral knee pain and low back pain is an 8/10. She currently takes celebrex and Percocet for pain and uses a topical cream.  She was recently evaluated by her orthopedist and the hardware all appears to be appropriately placed and the next follow-up is in 6 weeks.  The patient continues to work on weight loss and exercise and states that she has been doing more than in the past.   Patient reports no other health changes since previous encounter.    Interval History 04/09/2015:  Patient presents in clinic for one month follow up. She reports bilateral knee pain and low back pain is a 9/10 today. She currently takes percocet for pain as needed and uses a cane for ambulation. She states that the low back pain is new.  She continues to have bilateral knee pain and is in physical therapy.  She continues to take Celebrex daily and uses a topical pain cream regularly.    Patient reports no other health changes since previous encounter.    Interval history 3/5/2015:  Since previous encounter patient is status post right total knee replacement on 11/4/2014 and has been healed with postoperative visits showing good progress.  The patient  does have continued physical therapy sessions and has been attempting to lose weight and has lost approximately 25 pounds although her BMI continues to be 54.  She has been making good efforts to try and continue to increase her range of motion and lose weight.  She continues to have significant pain in bilateral knees and continues to use a cane for ambulation.  She was receiving oxycodone/acetaminophen 10/325 every 8 hours by mouth when necessary and requiring in order to persist in her physical therapy although the topical pain cream that she has been applying has been helping her to a limited degree she still requires the medication regularly.  Her recent x-ray imaging shows good positioning of the prosthesis.  No other health changes since previous encounter.     Interval history 2/17/2014:  Patient reports that she has been using the topical compounded cream on the knee approximately 4 times per day and she states that it does help her with her pain that she is experiencing.  She states that she has not gone to formal physical therapy but that she has been going to a pool that has exercise classes which is free for her and that she feels like it is helping her continue to lose weight.  Additionally she continues using hydrocodone/acetaminophen 7.5/750 approximately 3 times per day when necessary and states that also helps with her pain symptoms.  He she's still talks to have replacement for the left knee, but would like to lose weight further before going to that.  She has also had previous injections into her knees which have offered little to no relief in her pain symptoms.  She has had no other health changes since previous encounter.    Previous encounter 1/21/2014:  HPI Comments: 49 yo female presents for initial evaluation of bilateral knee pain, L>R. She is s/p arthroscopic right knee surgery and eventual replacement and then revision surgeries (Dr. Swan, Orthopedics). The pain is present in both  knees and is described as a terrible ache. She hears occasional popping in both knees with movement. The pain is worse with being on her feet and getting up from a sitting to standing position. Denies lower ext weakness or paresthesias. She does not have back pain or pain radiating down her legs. The pain is better with Celebrex and rest. She also takes Vicodin ES TID but this makes her very sleepy. She is not sure it helps with the pain because she usually falls asleep.     Physical Therapy: not since 2011 just prior to her 3rd right knee surgery (revision after replacement); has tried swimming which helps with weight loss    Non-pharmacologic Treatment: none    Pain Medications: Percocet and celebrex    Blood thinners: ASA 81 mg daily     Interventional Therapies:   steroid inj and visco-supplementation (series of 3) in left knee- not helpful  3/31/14 Bilateral genicular nerve blocks  2/16/16 Bilateral L2,3,4,5 MBB  3/1/16 Bilateral L2,3,4,5 MBB   4/6/16 Left L2,3,4,5 RFA- significant relief  4/20/16 Right L2,3,4,5 RFA- significant relief  10/5/16 Left L2,3,4,5 cooled RFA  10/19/16 Right L2,3,4,5 cooled RFA  4/26/17 Left L2,3,4,5 cooled RFA  5/9/17 Right L2,3,4,5 cooled RFA  12/26/2017- Left L2,3,4,5 cooled RFA  1/9/2018- Right L2,3,4,5 cooled RFA  6/26/18 Left L2,3,4,5 cooled RFA- 60% relief  7/10/18 Right L2,3,4,5 cooled RFA- 60% relief  9/28/18 TPIs- significant relief  12/27/18 Left L2,3,4,5 RFA- 70% relief  1/29/19 Right L2,3,4,5 RFA- 70% relief  6/18/19 Left L2,3,4,5 RFA- 70% relief  7/9/19 Right L2,3,4,5 RFA- 50% relief  12/19/19 Left L2,3,4,5 RFA- 80% relief  12/31/19 Right L2,3,4,5 RFA- 50% relief  3/2/2020- Right genicular nerve block- 70% relief for one month  3/12/20 Left subacromial bursa injection- 90% relief  5/18/2020- Left genicular nerve block- 70%  6/9/2020- Bilateral SI joint injections- 50% relief  10/13/2020- Right genicular RFA- 50% relief   11/3/2020- Left genicular RFA- 50%  relief    Relevant Surgeries: right knee surgery x3 (arthroscopic, then replacement and subsequent revision surgery), s/p left total knee arthroplasty    Relevant Imaging:  Xray Lumbar spine 09/21/2015  Lumbar spine radiograph    Comparison: None    Results: AP, lateral neutral, lateral flexion , lateral extension, bilateral oblique and spot views. The alignment of the lumbar spine demonstrates a mild levoscoliosis . 11-mm anterior listhesis of L4 relative to L5 with no translational abnormalities  seen on flexion and extension views. The vertebral body heights are well-maintained , mild disk space narrowing L4-L5 and L5-S1. Mild anterior and marginal osteophyte formation seen throughout the lumbar spine . The oblique views demonstrate no   definite spondylolysis. There is facet joint osseous hypertrophy noted at L3-L4 and L4-L5.      Impression         The Significant spondylosis of the lumbar spine with grade 1 anterior listhesis L4 relative to L5.  Facet joint osseous hypertrophy L3-L4 and L4-5.      Electronically signed by: BLESSING ROE MD  Date: 09/21/15  Time: 09:56          X-ray knee bilateral 8/26/2015:  Standing AP knees, lateral view of both knees and sunrise view of both patella. Study compared to May 2015. Postop change of bilateral knee replacement. The prosthetic components are in satisfactory position. Erosive changes involving the patella   again evident bilaterally.    Impression no significant change.      Xray Bilateral Knee 05/25/2015:  There are bilateral total knee arthroplasties with posterior resurfacing of the patella. As observed on 12/17/2014 there is a fracture of the left patella in the parasagittal plane.    Heterotopic bone is evident about each knee.    I detect no dislocation, unusual radiopaque retained foreign body, lytic or blastic lesion, or chondrocalcinosis.    Cervical MRI 4/24/2018:    Narrative     EXAMINATION:  MRI CERVICAL SPINE WITHOUT CONTRAST    CLINICAL  HISTORY:  Neck pain, first study;.  Cervicalgia.    TECHNIQUE:  Multiplanar, multisequence MR images of the cervical spine were acquired without the administration of contrast.    COMPARISON:  None.    FINDINGS:  There is reversal of the normal cervical lordosis which could be related to patient positioning versus muscle spasm.    Cervical vertebral body heights are well maintained without evidence of acute fracture or dislocation.  Marrow signal is unremarkable.    Spinal canal contents are unremarkable.  No abnormal masses or collections.    Individual levels as detailed below:    C2-3: No significant spinal canal stenosis or neuroforaminal narrowing.    C3-4: Facet arthropathy.  No significant spinal canal stenosis or neuroforaminal narrowing.    C4-5: Facet arthropathy.  Small broad-based disc osteophyte complex.  Mild right neuroforaminal narrowing.  Mild spinal canal stenosis.    C5-6: Facet arthropathy.  Uncovertebral joint spurring.  Broad-based posterior disc osteophyte complex.  Mild right neuroforaminal narrowing.  Mild spinal canal stenosis.    C6-7: Facet arthropathy.  No significant spinal canal stenosis or neuroforaminal narrowing.    C7-T1: No significant spinal canal stenosis or neuroforaminal narrowing.    Paraspinal muscles are unremarkable.  Soft tissues are intact.   Impression       Mild multilevel cervical spondylosis with mild spinal canal stenosis and mild right neuroforaminal narrowing at C4-5 and C5-6.       Lab Results   Component Value Date    HGBA1C 6.1 (H) 11/05/2020     Lab Results   Component Value Date    CHOL 134 11/05/2020    CHOL 152 03/04/2020    CHOL 160 01/02/2020     Lab Results   Component Value Date    HDL 54 11/05/2020    HDL 51 03/04/2020    HDL 49 01/02/2020     Lab Results   Component Value Date    LDLCALC 67.6 11/05/2020    LDLCALC 88.6 03/04/2020    LDLCALC 97.8 01/02/2020     Lab Results   Component Value Date    TRIG 62 11/05/2020    TRIG 62 03/04/2020    TRIG 66  01/02/2020     Lab Results   Component Value Date    CHOLHDL 40.3 11/05/2020    CHOLHDL 33.6 03/04/2020    CHOLHDL 30.6 01/02/2020         Past Medical History:   Diagnosis Date    Allergy     Anemia     Asthma     Bilateral shoulder bursitis     Cervical stenosis of spine     Chronic pain     DDD (degenerative disc disease), cervical     Depression 1/28/2019    Diabetes mellitus type II     DJD (degenerative joint disease) of knee 6/19/2014    Facet arthritis of lumbar region 12/17/2015    Facet syndrome 12/17/2015    GERD (gastroesophageal reflux disease)     Heartburn     Hyperlipidemia     Hypertension     Morbid obesity     Neuromuscular disorder     PADDY (obstructive sleep apnea)     Proteinuria     Right carpal tunnel syndrome     Sacroiliitis 6/13/2018    Sacroiliitis     Sleep apnea      Past Surgical History:   Procedure Laterality Date    CARPAL TUNNEL RELEASE      CARPAL TUNNEL RELEASE  1980s    left    CARPAL TUNNEL RELEASE  2012    right    COLONOSCOPY N/A 6/15/2020    Procedure: COLONOSCOPY;  Surgeon: Jc Koo MD;  Location: [facility] (4TH FLR);  Service: Endoscopy;  Laterality: N/A;  COVID screening on 6/13/20 at [facility]-rb  pt updated on drop off location and no visitor policy-rb    ESOPHAGOGASTRODUODENOSCOPY N/A 3/27/2019    Procedure: EGD (ESOPHAGOGASTRODUODENOSCOPY);  Surgeon: Robbin Bar MD;  Location: [facility] (2ND FLR);  Service: Endoscopy;  Laterality: N/A;  BMI 61.3/2nd floor case/svn    INJECTION OF JOINT Bilateral 6/9/2020    Procedure: INJECTION, JOINT, BILATERAL SI;  Surgeon: Lina Wagner MD;  [facility];  Service: Pain Management;  Laterality: Bilateral;  B/L SI Joint Injection    JOINT REPLACEMENT Bilateral     with 2 revisions on rt    KNEE SURGERY  3/2010    orthroscope    KNEE SURGERY  6-19-14    left TKR    LAPAROSCOPIC SLEEVE GASTRECTOMY N/A 8/28/2019    Procedure: GASTRECTOMY, SLEEVE, LAPAROSCOPIC with intraop  EGD;  Surgeon: Heriberto Clements MD;  Location: [facility] OR 2ND FLR;  Service: General;  Laterality: N/A;    RADIOFREQUENCY ABLATION Right 7/9/2019    Procedure: RADIOFREQUENCY ABLATION;  Surgeon: Lina Wagner MD;  Location: [facility] PAIN MGT;  Service: Pain Management;  Laterality: Right;  RIGHT RFA L2,3,4,5  2 of 2    RADIOFREQUENCY ABLATION Left 12/19/2019    Procedure: RADIOFREQUENCY ABLATION, LEFT L2-L3-L4-L5 MEDIAL BRANCH 1 OF 2;  Surgeon: Lina Wagner MD;  Location: [facility];  Service: Pain Management;  Laterality: Left;    RADIOFREQUENCY ABLATION Right 12/31/2019    Procedure: RADIOFREQUENCY ABLATION, RIGHT L2-L3-L4-L5  2 OF 2;  Surgeon: Lina Wagner MD;  Location: [facility]IN [facility];  Service: Pain Management;  Laterality: Right;    RADIOFREQUENCY ABLATION Right 10/13/2020    Procedure: RADIOFREQUENCY ABLATION, Genicular Cooled 1 of 2;  Surgeon: Lina Wagner MD;  Location: [facility];  Service: Pain Management;  Laterality: Right;    RADIOFREQUENCY ABLATION Left 11/3/2020    Procedure: RADIOFREQUENCY ABLATION, GENICULAR COOLED  2 OF 2;  Surgeon: Lina Wagner MD;  Location: [facility]IN [facility]GT;  Service: Pain Management;  Laterality: Left;    RADIOFREQUENCY ABLATION OF LUMBAR MEDIAL BRANCH NERVE AT SINGLE LEVEL Left 6/26/2018    Procedure: RADIOFREQUENCY ABLATION, NERVE, MEDIAL BRANCH, LUMBAR, 1 LEVEL;  Surgeon: Lina Wagner MD;  Location: [facility];  Service: Pain Management;  Laterality: Left;  Left Cooled RFA @ L2,3,4,5  [id number]  with Sedation    1 of 2    RADIOFREQUENCY ABLATION OF LUMBAR MEDIAL BRANCH NERVE AT SINGLE LEVEL Right 7/10/2018    Procedure: RADIOFREQUENCY ABLATION, NERVE, MEDIAL BRANCH, LUMBAR, 1 LEVEL;  Surgeon: Lina Wagner MD;  Location: [facility]IN [facility]GT;  Service: Pain Management;  Laterality: Right;  RIght Cooled RFA @ L2,3,4,5  [id number] with Sedation    2 of 2    TRIGGER FINGER RELEASE Right 2017    TRIGGER FINGER RELEASE Left  7/29/2019    Procedure: RELEASE, TRIGGER FINGER, Left Thumb;  Surgeon: Velma Garcia MD;  Location: [facility];  Service: Orthopedics;  Laterality: Left;  Local w/ MAC     Family History   Problem Relation Age of Onset    Diabetes Mother     Cataracts Mother     Diabetes Father     Cataracts Father     Coronary artery disease Brother     Diabetes Brother     Hypertension Sister     Diabetes Sister     No Known Problems Sister     Cancer Sister         lymphoma     Diabetes Brother     Hypotension Brother     Kidney failure Brother     Hypotension Brother     Amblyopia Neg Hx     Blindness Neg Hx     Glaucoma Neg Hx     Macular degeneration Neg Hx     Retinal detachment Neg Hx     Strabismus Neg Hx     Stroke Neg Hx     Thyroid disease Neg Hx      Social History     Socioeconomic History    Marital status:      Spouse name: Not on file    Number of children: Not on file    Years of education: Not on file    Highest education level: Not on file   Occupational History    Not on file   Social Needs    Financial resource strain: Not on file    Food insecurity     Worry: Not on file     Inability: Not on file    Transportation needs     Medical: Not on file     Non-medical: Not on file   Tobacco Use    Smoking status: Never Smoker    Smokeless tobacco: Never Used   Substance and Sexual Activity    Alcohol use: Yes     Comment: occasionally     Drug use: No    Sexual activity: Yes     Partners: Female     Birth control/protection: None   Lifestyle    Physical activity     Days per week: Not on file     Minutes per session: Not on file    Stress: Not on file   Relationships    Social connections     Talks on phone: Not on file     Gets together: Not on file     Attends Christianity service: Not on file     Active member of club or organization: Not on file     Attends meetings of clubs or organizations: Not on file     Relationship status: Not on file   Other Topics Concern     Not on file   Social History Narrative    Disabled. The patient is the youngest of 6 siblings. Single. Lives with single-sex partner.                  Review of patient's allergies indicates:  No Known Allergies    Medication List with Changes/Refills   Current Medications    ALBUTEROL (VENTOLIN HFA) 90 MCG/ACTUATION INHALER    INHALE TWO PUFFS INTO LUNGS EVERY 4 TO 6 HOURS AS NEEDED FOR SHORTNESS OF BREATH AND FOR WHEEZING    ALLOPURINOL (ZYLOPRIM) 300 MG TABLET    Take 1 tablet (300 mg total) by mouth 2 (two) times daily.    ATORVASTATIN (LIPITOR) 20 MG TABLET    Take 1 tablet (20 mg total) by mouth once daily.    B COMPLEX VITAMINS TABLET    Take 1 tablet by mouth every other day.     CALCIUM CITRATE/VITAMIN D2 (ISIAH-CITRATE ORAL)    Take 500 mg by mouth 2 (two) times daily.    CYANOCOBALAMIN (VITAMIN B-12) 1000 MCG TABLET    Take 100 mcg by mouth every morning.    FLUOXETINE (PROZAC) 20 MG/5 ML (4 MG/ML) SOLUTION    TAKE 10 MLS (40 MG TOTAL) BY MOUTH ONCE DAILY.    FLUTICASONE (FLONASE) 50 MCG/ACTUATION NASAL SPRAY    1 spray by Each Nare route 2 (two) times daily as needed for Rhinitis.    MULTIVIT-IRON-MIN-FOLIC ACID 3,500-18-0.4 UNIT-MG-MG ORAL CHEW    Take 1 tablet by mouth 2 (two) times daily.     OMEPRAZOLE (PRILOSEC) 20 MG CAPSULE    Take 1 capsule (20 mg total) by mouth every morning.    VITAMIN D 185 MG TAB    Take 5,000 mg by mouth every morning.          REVIEW OF SYSTEMS:    GENERAL:  Recent weight loss.  RESPIRATORY:  Negative for cough, wheezing or shortness of breath, patient denies any recent URI.  CARDIOVASCULAR:  Negative for chest pain, leg swelling or palpitations.  GI:  Negative for abdominal discomfort, blood in stools or black stools or change in bowel habits, occasional constipation.  MUSCULOSKELETAL:  See HPI.  SKIN:  Negative for lesions, rash, and itching.  PSYCH:  No mood disorder or recent psychosocial stressors.  Patient's sleep is disturbed secondary to pain (patient reports also  "that she has insomnia).  HEMATOLOGY/LYMPHOLOGY:  Negative for prolonged bleeding, bruising easily or swollen nodes.  81 mg aspirin  ENDO: Patient has a history of diabetes.  NEURO:   No history of headaches, syncope, paralysis, seizures or tremors.  All other reviewed and negative other than HPI.    OBJECTIVE:    /70   Pulse 71   Resp 18   Ht 5' 5" (1.651 m)   Wt 111.3 kg (245 lb 6 oz)   LMP 06/26/2018   SpO2 100%   BMI 40.83 kg/m²     PHYSICAL EXAMINATION:     GENERAL: Well appearing, in no acute distress, alert and oriented x3.   PSYCH:  Mood and affect appropriate.  SKIN: Skin color, texture, turgor normal, no rashes or lesions.  HEAD/FACE:  Normocephalic, atraumatic.   CV: RRR with palpation of the radial artery.  PULM: No evidence of respiratory difficulty, symmetric chest rise.  BACK: Negative SLR bilaterally. There is pain to palpation over the lumbar paraspinals bilaterally. There is pain with palpation over lumbar facet joints bilaterally. Limited ROM with pain on flexion and extension.  Positive facet loading bilaterally,  EXTREMITIES: There is mild pain with palpation to bilateral SI joints.  JENNA is negative bilaterally. Well-healed midline scars to bilateral knees.  Mild pain with extension of bilateral knees.   MUSCULOSKELETAL: Bilateral upper and lower extremity strength is normal and symmetric.  No atrophy or tone abnormalities are noted.  NEURO: Bilateral lower extremity coordination and muscle stretch reflexes are physiologic and symmetric.  Plantar response are downgoing. No clonus.  No loss of sensation is noted.  GAIT: Antalgic- ambulates without assistance.        Assessment:       Encounter Diagnoses   Name Primary?    Chronic pain syndrome Yes    Lumbar spondylosis     DDD (degenerative disc disease), lumbar     Sacroiliac joint pain     Chronic pain of both knees     Status post total bilateral knee replacement     Encounter for monitoring opioid maintenance therapy  "         Plan:       - Previous imaging was reviewed and discussed with the patient today.     - She is s/p bilateral genicular RFA with benefit. We can repeat this as needed.     - Schedule for left then right L2,3,4,5 RFA, one side at a time, two weeks apart.     - We can repeat SI joint injections as needed.     - Continue Percocet 10/325 mg TID PRN. Refill provided today with appropriate date.      - The patient is here today for a refill of current pain medications and they believe these provide effective pain control and improvements in quality of life.  The patient notes no serious side effects, and feels the benefits outweigh the risks.  The patient was reminded of the pain contract that they signed previously as well as the risks and benefits of the medication including possible death.  The updated [place] [employer] prescription monitoring program was reviewed, and the patient has been found to be compliant with current treatment plan. Medication management provided by Dr. Wagner.     - UDS from 9/23/2020 reviewed and consistent.     - RTC 3 weeks after above procedures.     - Dr. Wagner was consulted on the patient and agrees with this plan.    The above plan and management options were discussed at length with patient. Patient is in agreement with the above and verbalized understanding.     Adeola Robledo NP  12/01/2020

## 2020-12-01 NOTE — PROGRESS NOTES
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 continues to swim twice a week and is actively trying to lose weight and has been dieting.    Interval History 06/19/2015:  Patient presents in clinic for two month follow up. She reports her bilateral knee pain and low back pain is an 8/10. She currently takes celebrex and Percocet for pain and uses a topical cream.  She was recently evaluated by her orthopedist and the hardware all appears to be appropriately placed and the next follow-up is in 6 weeks.  The patient continues to work on weight loss and exercise and states that she has been doing more than in the past.   Patient reports no other health changes since previous encounter.    Interval History 04/09/2015:  Patient presents in clinic for one month follow up. She reports bilateral knee pain and low back pain is a 9/10 today. She currently takes percocet for pain as needed and uses a cane for ambulation. She states that the low back pain is new.  She continues to have bilateral knee pain and is in physical therapy.  She continues to take Celebrex daily and uses a topical pain cream regularly.    Patient reports no other health changes since previous encounter.    Interval history 3/5/2015:  Since previous encounter patient is status post right total knee replacement on 11/4/2014 and has been healed with postoperative visits showing good progress.  The patient  does have continued physical therapy sessions and has been attempting to lose weight and has lost approximately 25 pounds although her BMI continues to be 54.  She has been making good efforts to try and continue to increase her range of motion and lose weight.  She continues to have significant pain in bilateral knees and continues to use a cane for ambulation.  She was receiving oxycodone/acetaminophen 10/325 every 8 hours by mouth when necessary and requiring in order to persist in her physical therapy although the topical pain cream that she has been applying has been helping her to a limited degree she still requires the medication regularly.  Her recent x-ray imaging shows good positioning of the prosthesis.  No other health changes since previous encounter.     Interval history 2/17/2014:  Patient reports that she has been using the topical compounded cream on the knee approximately 4 times per day and she states that it does help her with her pain that she is experiencing.  She states that she has not gone to formal physical therapy but that she has been going to a pool that has exercise classes which is free for her and that she feels like it is helping her continue to lose weight.  Additionally she continues using hydrocodone/acetaminophen 7.5/750 approximately 3 times per day when necessary and states that also helps with her pain symptoms.  He she's still talks to have replacement for the left knee, but would like to lose weight further before going to that.  She has also had previous injections into her knees which have offered little to no relief in her pain symptoms.  She has had no other health changes since previous encounter.    Previous encounter 1/21/2014:  HPI Comments: 47 yo female presents for initial evaluation of bilateral knee pain, L>R. She is s/p arthroscopic right knee surgery and eventual replacement and then revision surgeries (Dr. Swan, Orthopedics). The pain is present in both  knees and is described as a terrible ache. She hears occasional popping in both knees with movement. The pain is worse with being on her feet and getting up from a sitting to standing position. Denies lower ext weakness or paresthesias. She does not have back pain or pain radiating down her legs. The pain is better with Celebrex and rest. She also takes Vicodin ES TID but this makes her very sleepy. She is not sure it helps with the pain because she usually falls asleep.     Physical Therapy: not since 2011 just prior to her 3rd right knee surgery (revision after replacement); has tried swimming which helps with weight loss    Non-pharmacologic Treatment: none    Pain Medications: Percocet and celebrex    Blood thinners: ASA 81 mg daily     Interventional Therapies:   steroid inj and visco-supplementation (series of 3) in left knee- not helpful  3/31/14 Bilateral genicular nerve blocks  2/16/16 Bilateral L2,3,4,5 MBB  3/1/16 Bilateral L2,3,4,5 MBB   4/6/16 Left L2,3,4,5 RFA- significant relief  4/20/16 Right L2,3,4,5 RFA- significant relief  10/5/16 Left L2,3,4,5 cooled RFA  10/19/16 Right L2,3,4,5 cooled RFA  4/26/17 Left L2,3,4,5 cooled RFA  5/9/17 Right L2,3,4,5 cooled RFA  12/26/2017- Left L2,3,4,5 cooled RFA  1/9/2018- Right L2,3,4,5 cooled RFA  6/26/18 Left L2,3,4,5 cooled RFA- 60% relief  7/10/18 Right L2,3,4,5 cooled RFA- 60% relief  9/28/18 TPIs- significant relief  12/27/18 Left L2,3,4,5 RFA- 70% relief  1/29/19 Right L2,3,4,5 RFA- 70% relief  6/18/19 Left L2,3,4,5 RFA- 70% relief  7/9/19 Right L2,3,4,5 RFA- 50% relief  12/19/19 Left L2,3,4,5 RFA- 80% relief  12/31/19 Right L2,3,4,5 RFA- 50% relief  3/2/2020- Right genicular nerve block- 70% relief for one month  3/12/20 Left subacromial bursa injection- 90% relief  5/18/2020- Left genicular nerve block- 70%  6/9/2020- Bilateral SI joint injections- 50% relief  10/13/2020- Right genicular RFA- 50% relief   11/3/2020- Left genicular RFA- 50%  relief    Relevant Surgeries: right knee surgery x3 (arthroscopic, then replacement and subsequent revision surgery), s/p left total knee arthroplasty    Relevant Imaging:  Xray Lumbar spine 09/21/2015  Lumbar spine radiograph    Comparison: None    Results: AP, lateral neutral, lateral flexion , lateral extension, bilateral oblique and spot views. The alignment of the lumbar spine demonstrates a mild levoscoliosis . 11-mm anterior listhesis of L4 relative to L5 with no translational abnormalities  seen on flexion and extension views. The vertebral body heights are well-maintained , mild disk space narrowing L4-L5 and L5-S1. Mild anterior and marginal osteophyte formation seen throughout the lumbar spine . The oblique views demonstrate no   definite spondylolysis. There is facet joint osseous hypertrophy noted at L3-L4 and L4-L5.      Impression         The Significant spondylosis of the lumbar spine with grade 1 anterior listhesis L4 relative to L5.  Facet joint osseous hypertrophy L3-L4 and L4-5.      Electronically signed by: BLESSING ROE MD  Date: 09/21/15  Time: 09:56          X-ray knee bilateral 8/26/2015:  Standing AP knees, lateral view of both knees and sunrise view of both patella. Study compared to May 2015. Postop change of bilateral knee replacement. The prosthetic components are in satisfactory position. Erosive changes involving the patella   again evident bilaterally.    Impression no significant change.      Xray Bilateral Knee 05/25/2015:  There are bilateral total knee arthroplasties with posterior resurfacing of the patella. As observed on 12/17/2014 there is a fracture of the left patella in the parasagittal plane.    Heterotopic bone is evident about each knee.    I detect no dislocation, unusual radiopaque retained foreign body, lytic or blastic lesion, or chondrocalcinosis.    Cervical MRI 4/24/2018:    Narrative     EXAMINATION:  MRI CERVICAL SPINE WITHOUT CONTRAST    CLINICAL  HISTORY:  Neck pain, first study;.  Cervicalgia.    TECHNIQUE:  Multiplanar, multisequence MR images of the cervical spine were acquired without the administration of contrast.    COMPARISON:  None.    FINDINGS:  There is reversal of the normal cervical lordosis which could be related to patient positioning versus muscle spasm.    Cervical vertebral body heights are well maintained without evidence of acute fracture or dislocation.  Marrow signal is unremarkable.    Spinal canal contents are unremarkable.  No abnormal masses or collections.    Individual levels as detailed below:    C2-3: No significant spinal canal stenosis or neuroforaminal narrowing.    C3-4: Facet arthropathy.  No significant spinal canal stenosis or neuroforaminal narrowing.    C4-5: Facet arthropathy.  Small broad-based disc osteophyte complex.  Mild right neuroforaminal narrowing.  Mild spinal canal stenosis.    C5-6: Facet arthropathy.  Uncovertebral joint spurring.  Broad-based posterior disc osteophyte complex.  Mild right neuroforaminal narrowing.  Mild spinal canal stenosis.    C6-7: Facet arthropathy.  No significant spinal canal stenosis or neuroforaminal narrowing.    C7-T1: No significant spinal canal stenosis or neuroforaminal narrowing.    Paraspinal muscles are unremarkable.  Soft tissues are intact.   Impression       Mild multilevel cervical spondylosis with mild spinal canal stenosis and mild right neuroforaminal narrowing at C4-5 and C5-6.       Lab Results   Component Value Date    HGBA1C 6.1 (H) 11/05/2020     Lab Results   Component Value Date    CHOL 134 11/05/2020    CHOL 152 03/04/2020    CHOL 160 01/02/2020     Lab Results   Component Value Date    HDL 54 11/05/2020    HDL 51 03/04/2020    HDL 49 01/02/2020     Lab Results   Component Value Date    LDLCALC 67.6 11/05/2020    LDLCALC 88.6 03/04/2020    LDLCALC 97.8 01/02/2020     Lab Results   Component Value Date    TRIG 62 11/05/2020    TRIG 62 03/04/2020    TRIG 66  01/02/2020     Lab Results   Component Value Date    CHOLHDL 40.3 11/05/2020    CHOLHDL 33.6 03/04/2020    CHOLHDL 30.6 01/02/2020         Past Medical History:   Diagnosis Date    Allergy     Anemia     Asthma     Bilateral shoulder bursitis     Cervical stenosis of spine     Chronic pain     DDD (degenerative disc disease), cervical     Depression 1/28/2019    Diabetes mellitus type II     DJD (degenerative joint disease) of knee 6/19/2014    Facet arthritis of lumbar region 12/17/2015    Facet syndrome 12/17/2015    GERD (gastroesophageal reflux disease)     Heartburn     Hyperlipidemia     Hypertension     Morbid obesity     Neuromuscular disorder     PADDY (obstructive sleep apnea)     Proteinuria     Right carpal tunnel syndrome     Sacroiliitis 6/13/2018    Sacroiliitis     Sleep apnea      Past Surgical History:   Procedure Laterality Date    CARPAL TUNNEL RELEASE      CARPAL TUNNEL RELEASE  1980s    left    CARPAL TUNNEL RELEASE  2012    right    COLONOSCOPY N/A 6/15/2020    Procedure: COLONOSCOPY;  Surgeon: Jc Koo MD;  Location: [facility] (4TH FLR);  Service: Endoscopy;  Laterality: N/A;  COVID screening on 6/13/20 at [facility]-rb  pt updated on drop off location and no visitor policy-rb    ESOPHAGOGASTRODUODENOSCOPY N/A 3/27/2019    Procedure: EGD (ESOPHAGOGASTRODUODENOSCOPY);  Surgeon: Robbin Bar MD;  Location: [facility] (2ND FLR);  Service: Endoscopy;  Laterality: N/A;  BMI 61.3/2nd floor case/svn    INJECTION OF JOINT Bilateral 6/9/2020    Procedure: INJECTION, JOINT, BILATERAL SI;  Surgeon: Lina Wagner MD;  [facility];  Service: Pain Management;  Laterality: Bilateral;  B/L SI Joint Injection    JOINT REPLACEMENT Bilateral     with 2 revisions on rt    KNEE SURGERY  3/2010    orthroscope    KNEE SURGERY  6-19-14    left TKR    LAPAROSCOPIC SLEEVE GASTRECTOMY N/A 8/28/2019    Procedure: GASTRECTOMY, SLEEVE, LAPAROSCOPIC with intraop  EGD;  Surgeon: Heriberto Clements MD;  Location: [facility] OR 2ND FLR;  Service: General;  Laterality: N/A;    RADIOFREQUENCY ABLATION Right 7/9/2019    Procedure: RADIOFREQUENCY ABLATION;  Surgeon: Lina Wagner MD;  Location: [facility] PAIN MGT;  Service: Pain Management;  Laterality: Right;  RIGHT RFA L2,3,4,5  2 of 2    RADIOFREQUENCY ABLATION Left 12/19/2019    Procedure: RADIOFREQUENCY ABLATION, LEFT L2-L3-L4-L5 MEDIAL BRANCH 1 OF 2;  Surgeon: Lina Wagner MD;  Location: [facility] PAIN MGT;  Service: Pain Management;  Laterality: Left;    RADIOFREQUENCY ABLATION Right 12/31/2019    Procedure: RADIOFREQUENCY ABLATION, RIGHT L2-L3-L4-L5  2 OF 2;  Surgeon: Lina Wagner MD;  Location: [facility] PAIN MGT;  Service: Pain Management;  Laterality: Right;    RADIOFREQUENCY ABLATION Right 10/13/2020    Procedure: RADIOFREQUENCY ABLATION, Genicular Cooled 1 of 2;  Surgeon: Lina Wagner MD;  Location: [facility] PAIN MGT;  Service: Pain Management;  Laterality: Right;    RADIOFREQUENCY ABLATION Left 11/3/2020    Procedure: RADIOFREQUENCY ABLATION, GENICULAR COOLED  2 OF 2;  Surgeon: Lina Wagner MD;  Location: [facility] PAIN MGT;  Service: Pain Management;  Laterality: Left;    RADIOFREQUENCY ABLATION OF LUMBAR MEDIAL BRANCH NERVE AT SINGLE LEVEL Left 6/26/2018    Procedure: RADIOFREQUENCY ABLATION, NERVE, MEDIAL BRANCH, LUMBAR, 1 LEVEL;  Surgeon: Lina Wagner MD;  Location: [facility] PAIN MGT;  Service: Pain Management;  Laterality: Left;  Left Cooled RFA @ L2,3,4,5  [id number]  with Sedation    1 of 2    RADIOFREQUENCY ABLATION OF LUMBAR MEDIAL BRANCH NERVE AT SINGLE LEVEL Right 7/10/2018    Procedure: RADIOFREQUENCY ABLATION, NERVE, MEDIAL BRANCH, LUMBAR, 1 LEVEL;  Surgeon: Lina Wagner MD;  Location: [facility] PAIN MGT;  Service: Pain Management;  Laterality: Right;  RIght Cooled RFA @ L2,3,4,5  [id number] with Sedation    2 of 2    TRIGGER FINGER RELEASE Right 2017    TRIGGER FINGER RELEASE Left  7/29/2019    Procedure: RELEASE, TRIGGER FINGER, Left Thumb;  Surgeon: Velma Garcia MD;  Location: [facility];  Service: Orthopedics;  Laterality: Left;  Local w/ MAC     Family History   Problem Relation Age of Onset    Diabetes Mother     Cataracts Mother     Diabetes Father     Cataracts Father     Coronary artery disease Brother     Diabetes Brother     Hypertension Sister     Diabetes Sister     No Known Problems Sister     Cancer Sister         lymphoma     Diabetes Brother     Hypotension Brother     Kidney failure Brother     Hypotension Brother     Amblyopia Neg Hx     Blindness Neg Hx     Glaucoma Neg Hx     Macular degeneration Neg Hx     Retinal detachment Neg Hx     Strabismus Neg Hx     Stroke Neg Hx     Thyroid disease Neg Hx      Social History     Socioeconomic History    Marital status:      Spouse name: Not on file    Number of children: Not on file    Years of education: Not on file    Highest education level: Not on file   Occupational History    Not on file   Social Needs    Financial resource strain: Not on file    Food insecurity     Worry: Not on file     Inability: Not on file    Transportation needs     Medical: Not on file     Non-medical: Not on file   Tobacco Use    Smoking status: Never Smoker    Smokeless tobacco: Never Used   Substance and Sexual Activity    Alcohol use: Yes     Comment: occasionally     Drug use: No    Sexual activity: Yes     Partners: Female     Birth control/protection: None   Lifestyle    Physical activity     Days per week: Not on file     Minutes per session: Not on file    Stress: Not on file   Relationships    Social connections     Talks on phone: Not on file     Gets together: Not on file     Attends Rastafari service: Not on file     Active member of club or organization: Not on file     Attends meetings of clubs or organizations: Not on file     Relationship status: Not on file   Other Topics Concern     Not on file   Social History Narrative    Disabled. The patient is the youngest of 6 siblings. Single. Lives with single-sex partner.                  Review of patient's allergies indicates:  No Known Allergies    Medication List with Changes/Refills   Current Medications    ALBUTEROL (VENTOLIN HFA) 90 MCG/ACTUATION INHALER    INHALE TWO PUFFS INTO LUNGS EVERY 4 TO 6 HOURS AS NEEDED FOR SHORTNESS OF BREATH AND FOR WHEEZING    ALLOPURINOL (ZYLOPRIM) 300 MG TABLET    Take 1 tablet (300 mg total) by mouth 2 (two) times daily.    ATORVASTATIN (LIPITOR) 20 MG TABLET    Take 1 tablet (20 mg total) by mouth once daily.    B COMPLEX VITAMINS TABLET    Take 1 tablet by mouth every other day.     CALCIUM CITRATE/VITAMIN D2 (ISIAH-CITRATE ORAL)    Take 500 mg by mouth 2 (two) times daily.    CYANOCOBALAMIN (VITAMIN B-12) 1000 MCG TABLET    Take 100 mcg by mouth every morning.    FLUOXETINE (PROZAC) 20 MG/5 ML (4 MG/ML) SOLUTION    TAKE 10 MLS (40 MG TOTAL) BY MOUTH ONCE DAILY.    FLUTICASONE (FLONASE) 50 MCG/ACTUATION NASAL SPRAY    1 spray by Each Nare route 2 (two) times daily as needed for Rhinitis.    MULTIVIT-IRON-MIN-FOLIC ACID 3,500-18-0.4 UNIT-MG-MG ORAL CHEW    Take 1 tablet by mouth 2 (two) times daily.     OMEPRAZOLE (PRILOSEC) 20 MG CAPSULE    Take 1 capsule (20 mg total) by mouth every morning.    VITAMIN D 185 MG TAB    Take 5,000 mg by mouth every morning.          REVIEW OF SYSTEMS:    GENERAL:  Recent weight loss.  RESPIRATORY:  Negative for cough, wheezing or shortness of breath, patient denies any recent URI.  CARDIOVASCULAR:  Negative for chest pain, leg swelling or palpitations.  GI:  Negative for abdominal discomfort, blood in stools or black stools or change in bowel habits, occasional constipation.  MUSCULOSKELETAL:  See HPI.  SKIN:  Negative for lesions, rash, and itching.  PSYCH:  No mood disorder or recent psychosocial stressors.  Patient's sleep is disturbed secondary to pain (patient reports also  "that she has insomnia).  HEMATOLOGY/LYMPHOLOGY:  Negative for prolonged bleeding, bruising easily or swollen nodes.  81 mg aspirin  ENDO: Patient has a history of diabetes.  NEURO:   No history of headaches, syncope, paralysis, seizures or tremors.  All other reviewed and negative other than HPI.    OBJECTIVE:    /70   Pulse 71   Resp 18   Ht 5' 5" (1.651 m)   Wt 111.3 kg (245 lb 6 oz)   LMP 06/26/2018   SpO2 100%   BMI 40.83 kg/m²     PHYSICAL EXAMINATION:     GENERAL: Well appearing, in no acute distress, alert and oriented x3.   PSYCH:  Mood and affect appropriate.  SKIN: Skin color, texture, turgor normal, no rashes or lesions.  HEAD/FACE:  Normocephalic, atraumatic.   CV: RRR with palpation of the radial artery.  PULM: No evidence of respiratory difficulty, symmetric chest rise.  BACK: Negative SLR bilaterally. There is pain to palpation over the lumbar paraspinals bilaterally. There is pain with palpation over lumbar facet joints bilaterally. Limited ROM with pain on flexion and extension.  Positive facet loading bilaterally,  EXTREMITIES: There is mild pain with palpation to bilateral SI joints.  JENNA is negative bilaterally. Well-healed midline scars to bilateral knees.  Mild pain with extension of bilateral knees.   MUSCULOSKELETAL: Bilateral upper and lower extremity strength is normal and symmetric.  No atrophy or tone abnormalities are noted.  NEURO: Bilateral lower extremity coordination and muscle stretch reflexes are physiologic and symmetric.  Plantar response are downgoing. No clonus.  No loss of sensation is noted.  GAIT: Antalgic- ambulates without assistance.        Assessment:       Encounter Diagnoses   Name Primary?    Chronic pain syndrome Yes    Lumbar spondylosis     DDD (degenerative disc disease), lumbar     Sacroiliac joint pain     Chronic pain of both knees     Status post total bilateral knee replacement     Encounter for monitoring opioid maintenance therapy  "         Plan:       - Previous imaging was reviewed and discussed with the patient today.     - She is s/p bilateral genicular RFA with benefit. We can repeat this as needed.     - Schedule for left then right L2,3,4,5 RFA, one side at a time, two weeks apart.     - We can repeat SI joint injections as needed.     - Continue Percocet 10/325 mg TID PRN. Refill provided today with appropriate date.      - The patient is here today for a refill of current pain medications and they believe these provide effective pain control and improvements in quality of life.  The patient notes no serious side effects, and feels the benefits outweigh the risks.  The patient was reminded of the pain contract that they signed previously as well as the risks and benefits of the medication including possible death.  The updated [place] [employer] prescription monitoring program was reviewed, and the patient has been found to be compliant with current treatment plan. Medication management provided by Dr. Wagner.     - UDS from 9/23/2020 reviewed and consistent.     - RTC 3 weeks after above procedures.     - Dr. Wagner was consulted on the patient and agrees with this plan.    The above plan and management options were discussed at length with patient. Patient is in agreement with the above and verbalized understanding.     Adeola Robledo NP  12/01/2020

## 2020-12-14 ENCOUNTER — OFFICE VISIT (OUTPATIENT)
Dept: PODIATRY | Facility: CLINIC | Age: 55
End: 2020-12-14
Payer: MEDICARE

## 2020-12-14 VITALS
HEIGHT: 65 IN | BODY MASS INDEX: 40.88 KG/M2 | DIASTOLIC BLOOD PRESSURE: 76 MMHG | WEIGHT: 245.38 LBS | SYSTOLIC BLOOD PRESSURE: 151 MMHG | HEART RATE: 64 BPM

## 2020-12-14 DIAGNOSIS — L84 CORNS AND CALLUS: Primary | ICD-10-CM

## 2020-12-14 DIAGNOSIS — R52 PAIN: Primary | ICD-10-CM

## 2020-12-14 DIAGNOSIS — B35.1 DERMATOPHYTOSIS OF NAIL: ICD-10-CM

## 2020-12-14 DIAGNOSIS — E11.40 TYPE 2 DIABETES MELLITUS WITH DIABETIC NEUROPATHY, UNSPECIFIED WHETHER LONG TERM INSULIN USE: ICD-10-CM

## 2020-12-14 PROCEDURE — 11056 PARNG/CUTG B9 HYPRKR LES 2-4: CPT | Mod: Q9,S$PBB,, | Performed by: PODIATRIST

## 2020-12-14 PROCEDURE — 11721 ROUTINE FOOT CARE: ICD-10-PCS | Mod: Q9,59,S$PBB, | Performed by: PODIATRIST

## 2020-12-14 PROCEDURE — 99214 OFFICE O/P EST MOD 30 MIN: CPT | Mod: PBBFAC,PN,25 | Performed by: PODIATRIST

## 2020-12-14 PROCEDURE — 11721 DEBRIDE NAIL 6 OR MORE: CPT | Mod: Q9,PBBFAC,PN | Performed by: PODIATRIST

## 2020-12-14 PROCEDURE — 11056 ROUTINE FOOT CARE: ICD-10-PCS | Mod: Q9,S$PBB,, | Performed by: PODIATRIST

## 2020-12-14 PROCEDURE — 99499 UNLISTED E&M SERVICE: CPT | Mod: S$PBB,,, | Performed by: PODIATRIST

## 2020-12-14 PROCEDURE — 99999 PR PBB SHADOW E&M-EST. PATIENT-LVL IV: ICD-10-PCS | Mod: PBBFAC,,, | Performed by: PODIATRIST

## 2020-12-14 PROCEDURE — 99999 PR PBB SHADOW E&M-EST. PATIENT-LVL IV: CPT | Mod: PBBFAC,,, | Performed by: PODIATRIST

## 2020-12-14 PROCEDURE — 99499 NO LOS: ICD-10-PCS | Mod: S$PBB,,, | Performed by: PODIATRIST

## 2020-12-14 RX ORDER — INDOMETHACIN 50 MG/1
50 CAPSULE ORAL 2 TIMES DAILY PRN
Qty: 60 CAPSULE | Refills: 0 | Status: SHIPPED | OUTPATIENT
Start: 2020-12-14 | End: 2021-01-13

## 2020-12-14 NOTE — PROGRESS NOTES
"  Chief Complaint   Patient presents with    Diabetes Mellitus     PCP Clarisse Richardson 11/4/20 A1c 6.1 11/5/20    Gout     8/10 foot pain    Foot Pain     8/10 foot pain           HPI:   The patient is a 55 y.o.  female  who presents for a diabetic foot exam.   Patient reports + presence of abnormal sensation to the feet, just sometimes, mostly at night.   Patient has no history of wounds on the feet.   Hx of foot surgery: none.     Shoe gear: casual shoes   This patient has documented high risk feet requiring routine maintenance secondary to diabetes.    Main concern today is need for toenail trimming. Routine trimming helps.  Foot exam due 5/2020            Primary care doctor is: Clarisse Richardson MD  Patient last saw primary care doctor on:  11/4/2020            Vitals:    12/14/20 1208 12/14/20 1215   BP:  (!) 151/76   Pulse:  64   Weight: 111.3 kg (245 lb 6 oz) 111.3 kg (245 lb 6 oz)   Height: 5' 5" (1.651 m) 5' 5" (1.651 m)   PainSc:    8   PainLoc:  Foot             Patient Active Problem List   Diagnosis    Morbid obesity    PADDY on CPAP    Type 2 diabetes mellitus, uncontrolled    Nuclear sclerosis - Both Eyes    Hyperlipemia    Proteinuria    Bilateral knee pain    DJD (degenerative joint disease) of knee    Debility    Prosthetic joint implant failure    Failed total knee arthroplasty    Right knee pain    Knee pain    S/P total knee replacement    Lumbago    CTS (carpal tunnel syndrome)    Right carpal tunnel syndrome    Chronic, continuous use of opioids    Mild intermittent asthma without complication    Left arm pain    Left shoulder pain    Allergic reaction to food    DDD (degenerative disc disease), cervical    Cervical radiculopathy    Cervical spondylosis    Cubital tunnel syndrome on right    Bilateral shoulder bursitis    Cervical stenosis of spinal canal    DDD (degenerative disc disease), thoracic    Thoracic spondylosis    Spondylolisthesis of lumbar " region    Spondylosis of lumbar region without myelopathy or radiculopathy    Spondylolisthesis of cervical region    Cervical spine instability    Myeloradiculopathy    Neural foraminal stenosis of cervical spine    DDD (degenerative disc disease), lumbar    Idiopathic scoliosis of thoracolumbar spine    Bilateral shoulder region arthritis    Impingement syndrome of right shoulder    Trochanteric bursitis of both hips    Chronic pain    Depression    Recurrent major depressive disorder, in partial remission    GERD (gastroesophageal reflux disease)    Trigger finger    Dyspnea    BMI 45.0-49.9, adult    Asthma    Sacroiliac joint pain           Current Outpatient Medications on File Prior to Visit   Medication Sig Dispense Refill    albuterol (VENTOLIN HFA) 90 mcg/actuation inhaler INHALE TWO PUFFS INTO LUNGS EVERY 4 TO 6 HOURS AS NEEDED FOR SHORTNESS OF BREATH AND FOR WHEEZING 18 each 0    allopurinoL (ZYLOPRIM) 300 MG tablet Take 1 tablet (300 mg total) by mouth 2 (two) times daily. 180 tablet 3    atorvastatin (LIPITOR) 20 MG tablet Take 1 tablet (20 mg total) by mouth once daily. 90 tablet 3    b complex vitamins tablet Take 1 tablet by mouth every other day.       calcium citrate/vitamin D2 (ISIAH-CITRATE ORAL) Take 500 mg by mouth 2 (two) times daily.      cyanocobalamin (VITAMIN B-12) 1000 MCG tablet Take 100 mcg by mouth every morning.      FLUoxetine (PROZAC) 20 mg/5 mL (4 mg/mL) solution TAKE 10 MLS (40 MG TOTAL) BY MOUTH ONCE DAILY. 300 mL 6    fluticasone (FLONASE) 50 mcg/actuation nasal spray 1 spray by Each Nare route 2 (two) times daily as needed for Rhinitis. 15 g 0    MULTIVIT-IRON-MIN-FOLIC ACID 3,500-18-0.4 UNIT-MG-MG ORAL CHEW Take 1 tablet by mouth 2 (two) times daily.       omeprazole (PRILOSEC) 20 MG capsule Take 1 capsule (20 mg total) by mouth every morning. 90 capsule 3    oxyCODONE-acetaminophen (PERCOCET)  mg per tablet Take 1 tablet by mouth every 8  (eight) hours as needed for Pain. 90 tablet 0    vitamin D 185 MG Tab Take 5,000 mg by mouth every morning.        No current facility-administered medications on file prior to visit.        Review of patient's allergies indicates:  No Known Allergies          Social History     Socioeconomic History    Marital status:      Spouse name: Not on file    Number of children: Not on file    Years of education: Not on file    Highest education level: Not on file   Occupational History    Not on file   Social Needs    Financial resource strain: Not on file    Food insecurity     Worry: Not on file     Inability: Not on file    Transportation needs     Medical: Not on file     Non-medical: Not on file   Tobacco Use    Smoking status: Never Smoker    Smokeless tobacco: Never Used   Substance and Sexual Activity    Alcohol use: Yes     Comment: occasionally     Drug use: No    Sexual activity: Yes     Partners: Female     Birth control/protection: None   Lifestyle    Physical activity     Days per week: Not on file     Minutes per session: Not on file    Stress: Not on file   Relationships    Social connections     Talks on phone: Not on file     Gets together: Not on file     Attends Sabianism service: Not on file     Active member of club or organization: Not on file     Attends meetings of clubs or organizations: Not on file     Relationship status: Not on file   Other Topics Concern    Not on file   Social History Narrative    Disabled. The patient is the youngest of 6 siblings. Single. Lives with single-sex partner.                        ROS:  General ROS: negative  Respiratory ROS: no cough, shortness of breath, or wheezing  Cardiovascular ROS: no chest pain or dyspnea on exertion  Musculoskeletal ROS: negative  Neurological ROS:   negative for - gait disturbance, + numbness/tingling, seizures or tremors  Dermatological ROS: + nail changes, dry skin          LAST HbA1c:   Hemoglobin A1C   Date  "Value Ref Range Status   11/05/2020 6.1 (H) 4.0 - 5.6 % Final     Comment:     ADA Screening Guidelines:  5.7-6.4%  Consistent with prediabetes  >or=6.5%  Consistent with diabetes  High levels of fetal hemoglobin interfere with the HbA1C  assay. Heterozygous hemoglobin variants (HbS, HgC, etc)do  not significantly interfere with this assay.   However, presence of multiple variants may affect accuracy.     04/27/2020 6.4 (H) 4.0 - 5.6 % Final     Comment:     ADA Screening Guidelines:  5.7-6.4%  Consistent with prediabetes  >or=6.5%  Consistent with diabetes  High levels of fetal hemoglobin interfere with the HbA1C  assay. Heterozygous hemoglobin variants (HbS, HgC, etc)do  not significantly interfere with this assay.   However, presence of multiple variants may affect accuracy.     01/02/2020 6.6 (H) 4.0 - 5.6 % Final     Comment:     ADA Screening Guidelines:  5.7-6.4%  Consistent with prediabetes  >or=6.5%  Consistent with diabetes  High levels of fetal hemoglobin interfere with the HbA1C  assay. Heterozygous hemoglobin variants (HbS, HgC, etc)do  not significantly interfere with this assay.   However, presence of multiple variants may affect accuracy.           EXAM:   Vitals:    12/14/20 1208 12/14/20 1215   BP:  (!) 151/76   BP Location:  Left arm   Patient Position:  Sitting   BP Method:  Large (Automatic)   Pulse:  64   Weight: 111.3 kg (245 lb 6 oz) 111.3 kg (245 lb 6 oz)   Height: 5' 5" (1.651 m) 5' 5" (1.651 m)       General: Pt. is well-developed, well-nourished, appears stated age, in no acute distress, alert and oriented x 3.      Vascular: Dorsalis pedis and posterior tibial pulses are 2/4 bilaterally. 3 secs capillary refill time and toes are warm to touch.    There is reduced hair growth on the feet.    There is 1+ edema.  no varicosities.      Neurological:  Light touch, proprioception, and sharp/dull sensation are all intact bilaterally. Protective threshold with the [place] monofilament " is diminished bilaterally.   +paresthesias      Dermatological:  The skin is thin    The toenails  1-5 L and 1-5 R  are greenish- yellow, long by 5-6mm and thickened by 2-3mm with subungual debris  .   There are no open wounds. There is no ecchymoses.  no erythema noted.   Skin diffusely dry on the soles     Interdigital spaces are intact, no fissures    Skin atrophic, thin, dry and mildly hyperpigmented.     Musculoskeletal:  Muscle strength is 5/5 in all groups bilaterally.  Metatarsophalangeal, subtalar, and ankle range of motion are intact without crepitus.           Assessment / Plan:    Problem List Items Addressed This Visit     None      Visit Diagnoses     Corns and callus    -  Primary    Dermatophytosis of nail        Type 2 diabetes mellitus with diabetic neuropathy, unspecified whether long term insulin use            I counseled the patient on the patient's conditions, their implications and medical management.    Shoe inspection. Diabetic Foot Education. Patient reminded of the importance of good nutrition and blood sugar control to help prevent podiatric complications of diabetes. Patient instructed on proper foot hygiene. We discussed wearing proper shoe gear, daily foot inspections, never walking without protective shoe gear, never putting sharp instruments to feet.    Routine Foot Care    Date/Time: 12/14/2020 12:15 PM  Performed by: Stacey Rowland DPM  Authorized by: Stacey Rowland DPM     Consent Done?:  Yes (Verbal)  Hyperkeratotic Skin Lesions?: Yes    Number of trimmed lesions:  2  Location(s):  Left Heel and Right Heel    Nail Care Type:  Debride  Location(s): All  (Left 1st Toe, Left 3rd Toe, Left 2nd Toe, Left 4th Toe, Left 5th Toe, Right 1st Toe, Right 2nd Toe, Right 3rd Toe, Right 4th Toe and Right 5th Toe)  Patient tolerance:  Patient tolerated the procedure well with no immediate complications     With patient's permission, the toenails mentioned above were aggressively reduced and  debrided using a nail nipper, removing all offending nail and debris. Utilizing a #15 scalpel, I trimmed the corns and calluses at the above mentioned location.      The patient will continue to monitor the areas daily, inspect the feet, wear protective shoe gear when ambulatory, and moisturizer to maintain skin integrity.

## 2020-12-14 NOTE — PATIENT INSTRUCTIONS
How to Check Your Feet    Below are tips to help you look for foot problems. Try to check your feet at the same time each day, such as when you get out of bed in the morning.    · Check the top of each foot. The tops of toes, back of the heel, and outer edge of the foot can get a lot of rubbing from poor-fitting shoes.    · Check the bottom of each foot. Daily wear and tear often leads to problems at pressure spots.    · Check the toes and nails. Fungal infections often occur between toes. Toenail problems can also be a sign of fungal infections or lead to breaks in the skin.    · Check your shoes, too. Loose objects inside a shoe can injure the foot. Use your hand to feel inside your shoes for things like alie, loose stitching, or rough areas that could irritate your skin.        Diabetic Foot Care    Diabetes can lead to a number of different foot complications. Fortunately, most of these complications can be prevented with a little extra foot care. If diabetes is not well controlled, the high blood sugar can cause damage to blood vessels and result in poor circulation to the foot. When the skin does not get enough blood flow, it becomes prone to pressure sores and ulcers, which heal slowly.  High blood sugar can also damage nerves, interfering with the ability to feel pain and pressure. When you cant feel your foot normally, it is easy to injure your skin, bones and joints without knowing it. For these reasons diabetes increases the risk of fungal infections, bunions and ulcers. Deep ulcers can lead to bone infection. Gangrene is the most serious foot complication of diabetes. It usually occurs on the tips of the toes as blacked areas of skin. The black area is dead tissue. In severe cases, gangrene spreads to involve the entire toe, other toes and the entire foot. Foot or toe amputation may be required. Good foot care and blood sugar control can prevent this.    Home Care  1. Wear comfortable, proper fitting  shoes.  2. Wash your feet daily with warm water and mild soap.  3. After drying, apply a moisturizing cream or lotion.  4. Check your feet daily for skin breaks, blisters, swelling, or redness. Look between your toes also.  5. Wear cotton socks and change them every day.  6. Trim toe nails carefully and do not cut your cuticles.  7. Strive to keep your blood sugar under control with a combination of medicines, diet and activity.  8. If you smoke and have diabetes, it is very important that you stop. Smoking reduces blood flow to your foot.  9. Avoid activities that increase your risk of foot injury:  · Do not walk barefoot.  · Do not use heating pads or hot water bottles on your feet.  · Do not put your foot in a hot tub without first checking the temperature with your hand.  10) Schedule yearly foot exams.    Follow Up  with your doctor or as advised by our staff. Report any cut, puncture, scrape, other injury, blister, ingrown toenail or ulcer on your foot.    Get Prompt Medical Attention  if any of the following occur:  -- Open ulcer with pus draining from the wound  -- Increasing foot or leg pain  -- New areas of redness or swelling or tender areas of the foot    © 6676-7876 [employer]. [address] [place] [address], [place] [address]. All rights reserved. This information is not intended as a substitute for professional medical care. Always follow your healthcare professional's instructions.      General nail care measures for abnormal nails include:    ? Keeping nails trimmed short    ? Avoiding trauma     ? Avoiding contact irritants     ? Keeping nails dry (avoiding wet work)    ? Avoiding all nail cosmetics

## 2020-12-15 ENCOUNTER — HOSPITAL ENCOUNTER (OUTPATIENT)
Facility: OTHER | Age: 55
Discharge: HOME | End: 2020-12-15
Attending: ANESTHESIOLOGY | Admitting: ANESTHESIOLOGY
Payer: MEDICARE

## 2020-12-15 VITALS
WEIGHT: 240 LBS | BODY MASS INDEX: 39.99 KG/M2 | DIASTOLIC BLOOD PRESSURE: 81 MMHG | TEMPERATURE: 99 F | OXYGEN SATURATION: 100 % | HEART RATE: 64 BPM | RESPIRATION RATE: 14 BRPM | HEIGHT: 65 IN | SYSTOLIC BLOOD PRESSURE: 135 MMHG

## 2020-12-15 DIAGNOSIS — M47.817 SPONDYLOSIS OF LUMBOSACRAL REGION WITHOUT MYELOPATHY OR RADICULOPATHY: Primary | ICD-10-CM

## 2020-12-15 DIAGNOSIS — M47.9 OSTEOARTHRITIS OF SPINE, UNSPECIFIED SPINAL OSTEOARTHRITIS COMPLICATION STATUS, UNSPECIFIED SPINAL REGION: ICD-10-CM

## 2020-12-15 DIAGNOSIS — G89.29 CHRONIC PAIN: ICD-10-CM

## 2020-12-15 PROCEDURE — 64635 PR DESTROY LUMB/SAC FACET JNT: ICD-10-PCS | Mod: LT,,, | Performed by: ANESTHESIOLOGY

## 2020-12-15 PROCEDURE — 64635 DESTROY LUMB/SAC FACET JNT: CPT | Mod: LT,,, | Performed by: ANESTHESIOLOGY

## 2020-12-15 PROCEDURE — 25000003 PHARM REV CODE 250: Performed by: ANESTHESIOLOGY

## 2020-12-15 PROCEDURE — 64636 DESTROY L/S FACET JNT ADDL: CPT | Mod: LT | Performed by: ANESTHESIOLOGY

## 2020-12-15 PROCEDURE — 64636 PR DESTROY L/S FACET JNT ADDL: ICD-10-PCS | Mod: LT,,, | Performed by: ANESTHESIOLOGY

## 2020-12-15 PROCEDURE — 64635 DESTROY LUMB/SAC FACET JNT: CPT | Mod: LT | Performed by: ANESTHESIOLOGY

## 2020-12-15 PROCEDURE — 63600175 PHARM REV CODE 636 W HCPCS: Performed by: ANESTHESIOLOGY

## 2020-12-15 PROCEDURE — 64636 DESTROY L/S FACET JNT ADDL: CPT | Mod: LT,,, | Performed by: ANESTHESIOLOGY

## 2020-12-15 RX ORDER — SODIUM CHLORIDE 9 MG/ML
INJECTION, SOLUTION INTRAVENOUS CONTINUOUS
Status: ACTIVE | OUTPATIENT
Start: 2020-12-15

## 2020-12-15 RX ORDER — LIDOCAINE HYDROCHLORIDE 10 MG/ML
INJECTION INFILTRATION; PERINEURAL
Status: DISCONTINUED | OUTPATIENT
Start: 2020-12-15 | End: 2020-12-15 | Stop reason: HOSPADM

## 2020-12-15 RX ORDER — BUPIVACAINE HYDROCHLORIDE 2.5 MG/ML
INJECTION, SOLUTION EPIDURAL; INFILTRATION; INTRACAUDAL
Status: DISCONTINUED | OUTPATIENT
Start: 2020-12-15 | End: 2020-12-15 | Stop reason: HOSPADM

## 2020-12-15 RX ORDER — MIDAZOLAM HYDROCHLORIDE 1 MG/ML
INJECTION INTRAMUSCULAR; INTRAVENOUS
Status: DISCONTINUED | OUTPATIENT
Start: 2020-12-15 | End: 2020-12-15 | Stop reason: HOSPADM

## 2020-12-15 RX ORDER — FENTANYL CITRATE 50 UG/ML
INJECTION, SOLUTION INTRAMUSCULAR; INTRAVENOUS
Status: DISCONTINUED | OUTPATIENT
Start: 2020-12-15 | End: 2020-12-15 | Stop reason: HOSPADM

## 2020-12-15 RX ADMIN — SODIUM CHLORIDE 25 ML/HR: 0.9 INJECTION, SOLUTION INTRAVENOUS at 03:12

## 2020-12-15 NOTE — OP NOTE
Lumbar Medial nerve branch block radiofrequency ablation Under Fluoroscopy     Time-out taken to identify patient and procedure side prior to starting the procedure.     12/15/2020    PROCEDURE: Left radiofrequency ablation of the the medial branch nerves at the   transverse process of  L3, L4, L5 and sacral ala    2)Conscious sedation provided by MD     REASON FOR PROCEDURE: Lumbar spondylosis [M47.816]     PHYSICIAN: Lina Wagner MD     ASSISTANTS: Ciro Chung MD     MEDICATIONS INJECTED: 0.25% Bupivicaine total 8mL     LOCAL ANESTHETIC USED: Xylocaine 1% 1mL / site     ESTIMATED BLOOD LOSS: None.     COMPLICATIONS: None.     Interval history: Patient reports that he had complete relief of pain for the day of the procedure, we will proceed with the RFA     TECHNIQUE: Laying in a prone position, the patient was prepped and draped in the usual sterile fashion using ChloraPrep and fenestrated drape. The level was determined under fluoroscopic guidance. Local anesthetic was given by going down to the hub of the 27-gauge 1.25in needle and raising a wheel. A 18-gauge 10mm curved active tip needle was introduced to the anatomic local of the medial branch at each of the above levels using fluoroscopy. Then sensory and motor testing was performed to confirm that the needle tips were in the correct location. Then after negative aspiration, 1 mL of 0.25% bupivacaine was injected into each level. This was followed by thermal lesioning at 80 degrees celsius for 90 seconds.     Conscious sedation provided by M.D   The patient was monitored with continuous pulse oximetry, EKG, and intermittent blood pressure monitors. The patient was hemodynamically stable throughout the entire process was responsive to voice, and breathing spontaneously. Supplemental O2 was provided at 2L/min via nasal cannula. Patient was comfortable for the duration of the procedure. (See nurse documentation and case log for sedation time)    There was  a total of 2mg IV Midazolam and 50mcg Fentanyl titrated for the procedure    The patient tolerated the procedure well. Did not have any procedure related motor deficit at the conclusion of the procedure    The patient was monitored after the procedure. Patient was given post procedure and discharge instructions to follow at home. We will see the patient back in two weeks or the patient may call to inform of status. The patient was discharged in a stable condition

## 2020-12-15 NOTE — DISCHARGE SUMMARY
Discharge Note  Short Stay      SUMMARY     Admit Date: 12/15/2020    Attending Physician: Lina Wagner      Discharge Physician: Lina Wagner      Discharge Date: 12/15/2020 3:32 PM    Procedure(s) (LRB):  RADIOFREQUENCY ABLATION LEFT L2,3,4,5 1 of 2 (Left)    Final Diagnosis: Lumbar spondylosis [M47.816]    Disposition: Home or self care    Patient Instructions:   Current Discharge Medication List      CONTINUE these medications which have NOT CHANGED    Details   albuterol (VENTOLIN HFA) 90 mcg/actuation inhaler INHALE TWO PUFFS INTO LUNGS EVERY 4 TO 6 HOURS AS NEEDED FOR SHORTNESS OF BREATH AND FOR WHEEZING  Qty: 18 each, Refills: 0    Comments: Please consider 90 day supplies to promote better adherence  Associated Diagnoses: Asthma, well controlled, mild intermittent      allopurinoL (ZYLOPRIM) 300 MG tablet Take 1 tablet (300 mg total) by mouth 2 (two) times daily.  Qty: 180 tablet, Refills: 3      atorvastatin (LIPITOR) 20 MG tablet Take 1 tablet (20 mg total) by mouth once daily.  Qty: 90 tablet, Refills: 3      b complex vitamins tablet Take 1 tablet by mouth every other day.       calcium citrate/vitamin D2 (ISIAH-CITRATE ORAL) Take 500 mg by mouth 2 (two) times daily.      cyanocobalamin (VITAMIN B-12) 1000 MCG tablet Take 100 mcg by mouth every morning.      FLUoxetine (PROZAC) 20 mg/5 mL (4 mg/mL) solution TAKE 10 MLS (40 MG TOTAL) BY MOUTH ONCE DAILY.  Qty: 300 mL, Refills: 6      fluticasone (FLONASE) 50 mcg/actuation nasal spray 1 spray by Each Nare route 2 (two) times daily as needed for Rhinitis.  Qty: 15 g, Refills: 0      indomethacin (INDOCIN) 50 MG capsule Take 1 capsule (50 mg total) by mouth 2 (two) times daily as needed. With meals.  For foot pain  Qty: 60 capsule, Refills: 0    Associated Diagnoses: Pain      MULTIVIT-IRON-MIN-FOLIC ACID 3,500-18-0.4 UNIT-MG-MG ORAL CHEW Take 1 tablet by mouth 2 (two) times daily.       omeprazole (PRILOSEC) 20 MG capsule Take 1 capsule (20 mg total)  by mouth every morning.  Qty: 90 capsule, Refills: 3    Associated Diagnoses: Gastroesophageal reflux disease without esophagitis      oxyCODONE-acetaminophen (PERCOCET)  mg per tablet Take 1 tablet by mouth every 8 (eight) hours as needed for Pain.  Qty: 90 tablet, Refills: 0    Comments: Quantity prescribed more than 7 day supply? Yes, quantity medically necessary  Associated Diagnoses: Chronic pain syndrome; Lumbar spondylosis; DDD (degenerative disc disease), lumbar; Sacroiliac joint pain; Chronic pain of both knees; Status post total bilateral knee replacement      vitamin D 185 MG Tab Take 5,000 mg by mouth every morning.                  Discharge Diagnosis: Lumbar spondylosis [M47.816]  Condition on Discharge: Stable with no complications to procedure   Diet on Discharge: Same as before.  Activity: as per instruction sheet.  Discharge to: Home with a responsible adult.  Follow up: 2-4 weeks       Please call my office or pager at [phone] if experienced any weakness or loss of sensation, fever > 101.5, pain uncontrolled with oral medications, persistent nausea/vomiting/or diarrhea, redness or drainage from the incisions, or any other worrisome concerns. If physician on call was not reached or could not communicate with our office for any reason please go to the nearest emergency department

## 2020-12-15 NOTE — DISCHARGE INSTRUCTIONS
Thank you for allowing us to care for you today. You may receive a survey about the care we provided. Your feedback is valuable and helps us provide excellent care throughout the community.     Home Care Instructions for Pain Management:    1. DIET:   You may resume your normal diet today.   2. BATHING:   You may shower with luke warm water. No tub baths or anything that will soak injection sites under water for the next 24 hours.  3. DRESSING:   You may remove your bandage today.   4. ACTIVITY LEVEL:   You may resume your normal activities 24 hrs after your procedure. Nothing strenuous today.  5. MEDICATIONS:   You may resume your normal medications today. To restart blood thinners, ask your doctor.  6. DRIVING    If you have received any sedatives by mouth today, you may not drive for 12 hours.    If you have received any sedation through your IV, you may not drive for 24 hrs.   7. SPECIAL INSTRUCTIONS:   No heat to the injection site for 24 hrs including, hot bath or shower, heating pad, moist heat, or hot tubs.    Use ice pack to injection site for any pain or discomfort.  Apply ice packs for 20 minute intervals as needed.    IF you have diabetes, be sure to monitor your blood sugar more closely. IF your injection contained steroids your blood sugar levels may become higher than normal.    If you are still having pain upon discharge:  Your pain may improve over the next 48 hours. The anesthetic (numbing medication) works immediately to 48 hours. IF your injection contained a steroid (anti-inflammatory medication), it takes approximately 3 days to start feeling relief and 7-10 days to see your greatest results from the medication. It is possible you may need subsequent injections. This would be discussed at your follow up appointment with pain management or your referring doctor.    Please call the PAIN MANAGEMENT office at [phone] or ON CALL pager at [phone] if you experienced any:   -Weakness or  loss of sensation  -Fever > 101.5  -Pain uncontrolled with oral medications   -Persistent nausea, vomiting, or diarrhea  -Redness or drainage from the injection sites, or any other worrisome concerns.   If physician on call was not reached or could not communicate with our office for any reason please go to the nearest emergency department.    Recovery After Procedural Sedation (Adult)   You have been given medicine by vein to make you sleep during your surgery. This may have included both a pain medicine and sleeping medicine. Most of the effects have worn off. But you may still have some drowsiness for the next 6 to 8 hours.  Home care  Follow these guidelines when you get home:  · For the next 8 hours, you should be watched by a responsible adult. This person should make sure your condition is not getting worse.  · Don't drink any alcohol for the next 24 hours.  · Don't drive, operate dangerous machinery, or make important business or personal decisions during the next 24 hours.  · To prevent injury or falls, use caution when standing and walking for at least 24 hours after your procedure.  Note: Your healthcare provider may tell you not to take any medicine by mouth for pain or sleep in the next 4 hours. These medicines may react with the medicines you were given in the hospital. This could cause a much stronger response than usual.  Follow-up care  Follow up with your healthcare provider if you are not alert and back to your usual level of activity within 12 hours.  When to seek medical advice  Call your healthcare provider right away if any of these occur:  · Drowsiness gets worse  · Weakness or dizziness gets worse  · Repeated vomiting  · You can't be awakened  · Fever  · New rash  [employer] last reviewed this educational content on 9/1/2019 © 2000-2020 [employer]. [address] [place] [address], [place] [address]. All rights reserved. This information is not intended as a substitute for professional  medical care. Always follow your healthcare professional's instructions.

## 2020-12-29 ENCOUNTER — HOSPITAL ENCOUNTER (OUTPATIENT)
Facility: OTHER | Age: 55
Discharge: HOME | End: 2020-12-29
Attending: ANESTHESIOLOGY | Admitting: ANESTHESIOLOGY
Payer: MEDICARE

## 2020-12-29 VITALS
TEMPERATURE: 98 F | WEIGHT: 240 LBS | DIASTOLIC BLOOD PRESSURE: 68 MMHG | BODY MASS INDEX: 39.99 KG/M2 | HEART RATE: 68 BPM | SYSTOLIC BLOOD PRESSURE: 106 MMHG | HEIGHT: 65 IN | OXYGEN SATURATION: 98 % | RESPIRATION RATE: 18 BRPM

## 2020-12-29 DIAGNOSIS — M47.819 SPONDYLOSIS WITHOUT MYELOPATHY: ICD-10-CM

## 2020-12-29 DIAGNOSIS — M47.9 OSTEOARTHRITIS OF SPINE, UNSPECIFIED SPINAL OSTEOARTHRITIS COMPLICATION STATUS, UNSPECIFIED SPINAL REGION: Primary | ICD-10-CM

## 2020-12-29 DIAGNOSIS — G89.29 CHRONIC PAIN: ICD-10-CM

## 2020-12-29 PROCEDURE — 64636 PR DESTROY L/S FACET JNT ADDL: ICD-10-PCS | Mod: RT,,, | Performed by: ANESTHESIOLOGY

## 2020-12-29 PROCEDURE — 64636 DESTROY L/S FACET JNT ADDL: CPT | Mod: RT | Performed by: ANESTHESIOLOGY

## 2020-12-29 PROCEDURE — 25000003 PHARM REV CODE 250: Performed by: ANESTHESIOLOGY

## 2020-12-29 PROCEDURE — 64635 DESTROY LUMB/SAC FACET JNT: CPT | Mod: RT | Performed by: ANESTHESIOLOGY

## 2020-12-29 PROCEDURE — 63600175 PHARM REV CODE 636 W HCPCS: Performed by: ANESTHESIOLOGY

## 2020-12-29 PROCEDURE — 64635 DESTROY LUMB/SAC FACET JNT: CPT | Mod: RT,,, | Performed by: ANESTHESIOLOGY

## 2020-12-29 PROCEDURE — 25000003 PHARM REV CODE 250: Performed by: STUDENT IN AN ORGANIZED HEALTH CARE EDUCATION/TRAINING PROGRAM

## 2020-12-29 PROCEDURE — 64635 PR DESTROY LUMB/SAC FACET JNT: ICD-10-PCS | Mod: RT,,, | Performed by: ANESTHESIOLOGY

## 2020-12-29 PROCEDURE — 64636 DESTROY L/S FACET JNT ADDL: CPT | Mod: RT,,, | Performed by: ANESTHESIOLOGY

## 2020-12-29 RX ORDER — MIDAZOLAM HYDROCHLORIDE 1 MG/ML
INJECTION INTRAMUSCULAR; INTRAVENOUS
Status: DISCONTINUED | OUTPATIENT
Start: 2020-12-29 | End: 2020-12-29 | Stop reason: HOSPADM

## 2020-12-29 RX ORDER — SODIUM CHLORIDE 9 MG/ML
INJECTION, SOLUTION INTRAVENOUS CONTINUOUS
Status: DISCONTINUED | OUTPATIENT
Start: 2020-12-29 | End: 2020-12-29 | Stop reason: HOSPADM

## 2020-12-29 RX ORDER — LIDOCAINE HYDROCHLORIDE 10 MG/ML
INJECTION INFILTRATION; PERINEURAL
Status: DISCONTINUED | OUTPATIENT
Start: 2020-12-29 | End: 2020-12-29 | Stop reason: HOSPADM

## 2020-12-29 RX ORDER — FENTANYL CITRATE 50 UG/ML
INJECTION, SOLUTION INTRAMUSCULAR; INTRAVENOUS
Status: DISCONTINUED | OUTPATIENT
Start: 2020-12-29 | End: 2020-12-29 | Stop reason: HOSPADM

## 2020-12-29 RX ORDER — BUPIVACAINE HYDROCHLORIDE 2.5 MG/ML
INJECTION, SOLUTION EPIDURAL; INFILTRATION; INTRACAUDAL
Status: DISCONTINUED | OUTPATIENT
Start: 2020-12-29 | End: 2020-12-29 | Stop reason: HOSPADM

## 2020-12-29 RX ADMIN — SODIUM CHLORIDE 25 ML/HR: 0.9 INJECTION, SOLUTION INTRAVENOUS at 08:12

## 2020-12-29 NOTE — DISCHARGE SUMMARY
Discharge Note  Short Stay      SUMMARY     Admit Date: 12/29/2020    Attending Physician: Lina Wagner      Discharge Physician: Lina Wagner      Discharge Date: 12/29/2020 8:48 AM    Procedure(s) (LRB):  RADIOFREQUENCY ABLATION RIGHT L2,3,4,5 2 of 2 (Right)    Final Diagnosis: Lumbar spondylosis [M47.816]    Disposition: Home or self care    Patient Instructions:   Current Discharge Medication List      CONTINUE these medications which have NOT CHANGED    Details   albuterol (VENTOLIN HFA) 90 mcg/actuation inhaler INHALE TWO PUFFS INTO LUNGS EVERY 4 TO 6 HOURS AS NEEDED FOR SHORTNESS OF BREATH AND FOR WHEEZING  Qty: 18 each, Refills: 0    Comments: Please consider 90 day supplies to promote better adherence  Associated Diagnoses: Asthma, well controlled, mild intermittent      allopurinoL (ZYLOPRIM) 300 MG tablet Take 1 tablet (300 mg total) by mouth 2 (two) times daily.  Qty: 180 tablet, Refills: 3      atorvastatin (LIPITOR) 20 MG tablet Take 1 tablet (20 mg total) by mouth once daily.  Qty: 90 tablet, Refills: 3      b complex vitamins tablet Take 1 tablet by mouth every other day.       calcium citrate/vitamin D2 (ISIAH-CITRATE ORAL) Take 500 mg by mouth 2 (two) times daily.      cyanocobalamin (VITAMIN B-12) 1000 MCG tablet Take 100 mcg by mouth every morning.      FLUoxetine (PROZAC) 20 mg/5 mL (4 mg/mL) solution TAKE 10 MLS (40 MG TOTAL) BY MOUTH ONCE DAILY.  Qty: 300 mL, Refills: 6      fluticasone (FLONASE) 50 mcg/actuation nasal spray 1 spray by Each Nare route 2 (two) times daily as needed for Rhinitis.  Qty: 15 g, Refills: 0      indomethacin (INDOCIN) 50 MG capsule Take 1 capsule (50 mg total) by mouth 2 (two) times daily as needed. With meals.  For foot pain  Qty: 60 capsule, Refills: 0    Associated Diagnoses: Pain      MULTIVIT-IRON-MIN-FOLIC ACID 3,500-18-0.4 UNIT-MG-MG ORAL CHEW Take 1 tablet by mouth 2 (two) times daily.       omeprazole (PRILOSEC) 20 MG capsule Take 1 capsule (20 mg  total) by mouth every morning.  Qty: 90 capsule, Refills: 3    Associated Diagnoses: Gastroesophageal reflux disease without esophagitis      oxyCODONE-acetaminophen (PERCOCET)  mg per tablet Take 1 tablet by mouth every 8 (eight) hours as needed for Pain.  Qty: 90 tablet, Refills: 0    Comments: Quantity prescribed more than 7 day supply? Yes, quantity medically necessary  Associated Diagnoses: Chronic pain syndrome; Lumbar spondylosis; DDD (degenerative disc disease), lumbar; Sacroiliac joint pain; Chronic pain of both knees; Status post total bilateral knee replacement      vitamin D 185 MG Tab Take 5,000 mg by mouth every morning.                  Discharge Diagnosis: Lumbar spondylosis [M47.816]  Condition on Discharge: Stable with no complications to procedure   Diet on Discharge: Same as before.  Activity: as per instruction sheet.  Discharge to: Home with a responsible adult.  Follow up: 2-4 weeks       Please call my office or pager at [phone] if experienced any weakness or loss of sensation, fever > 101.5, pain uncontrolled with oral medications, persistent nausea/vomiting/or diarrhea, redness or drainage from the incisions, or any other worrisome concerns. If physician on call was not reached or could not communicate with our office for any reason please go to the nearest emergency department

## 2020-12-29 NOTE — DISCHARGE INSTRUCTIONS

## 2020-12-29 NOTE — OP NOTE
Lumbar Medial nerve branch block radiofrequency ablation Under Fluoroscopy     Time-out taken to identify patient and procedure side prior to starting the procedure.     12/29/2020    PROCEDURE: Right radiofrequency ablation of the the medial branch nerves at the   transverse process of  L3, L4, L5 and sacral ala    2)Conscious sedation provided by MD     REASON FOR PROCEDURE: Lumbar spondylosis [M47.816]     PHYSICIAN: Lina Wagner MD     ASSISTANTS: None     MEDICATIONS INJECTED: 0.25% Bupivicaine total 8mL     LOCAL ANESTHETIC USED: Xylocaine 1% 1mL / site     ESTIMATED BLOOD LOSS: None.     COMPLICATIONS: None.     Interval history: Patient reports that he had complete relief of pain for the day of the procedure, we will proceed with the RFA     TECHNIQUE: Laying in a prone position, the patient was prepped and draped in the usual sterile fashion using ChloraPrep and fenestrated drape. The level was determined under fluoroscopic guidance. Local anesthetic was given by going down to the hub of the 27-gauge 1.25in needle and raising a wheel. A 18-gauge 10mm curved active tip needle was introduced to the anatomic local of the medial branch at each of the above levels using fluoroscopy. Then sensory and motor testing was performed to confirm that the needle tips were in the correct location. Then after negative aspiration, 1 mL of 0.25% bupivacaine was injected into each level. This was followed by thermal lesioning at 80 degrees celsius for 90 seconds.     Conscious sedation provided by M.D   The patient was monitored with continuous pulse oximetry, EKG, and intermittent blood pressure monitors. The patient was hemodynamically stable throughout the entire process was responsive to voice, and breathing spontaneously. Supplemental O2 was provided at 2L/min via nasal cannula. Patient was comfortable for the duration of the procedure. (See nurse documentation and case log for sedation time)    There was a total of  2mg IV Midazolam and 75mcg Fentanyl titrated for the procedure    The patient tolerated the procedure well. Did not have any procedure related motor deficit at the conclusion of the procedure    The patient was monitored after the procedure. Patient was given post procedure and discharge instructions to follow at home. We will see the patient back in two weeks or the patient may call to inform of status. The patient was discharged in a stable condition

## 2021-01-11 ENCOUNTER — TELEPHONE (OUTPATIENT)
Dept: PAIN MEDICINE | Facility: CLINIC | Age: 56
End: 2021-01-11

## 2021-01-11 DIAGNOSIS — Z96.653 STATUS POST TOTAL BILATERAL KNEE REPLACEMENT: ICD-10-CM

## 2021-01-11 DIAGNOSIS — M25.561 CHRONIC PAIN OF BOTH KNEES: ICD-10-CM

## 2021-01-11 DIAGNOSIS — G89.4 CHRONIC PAIN SYNDROME: Primary | ICD-10-CM

## 2021-01-11 DIAGNOSIS — M47.816 LUMBAR SPONDYLOSIS: ICD-10-CM

## 2021-01-11 DIAGNOSIS — M51.36 DDD (DEGENERATIVE DISC DISEASE), LUMBAR: ICD-10-CM

## 2021-01-11 DIAGNOSIS — G89.29 CHRONIC PAIN OF BOTH KNEES: ICD-10-CM

## 2021-01-11 DIAGNOSIS — M25.562 CHRONIC PAIN OF BOTH KNEES: ICD-10-CM

## 2021-01-11 DIAGNOSIS — M53.3 SACROILIAC JOINT PAIN: ICD-10-CM

## 2021-01-12 ENCOUNTER — OFFICE VISIT (OUTPATIENT)
Dept: PAIN MEDICINE | Facility: CLINIC | Age: 56
End: 2021-01-12
Payer: MEDICARE

## 2021-01-12 VITALS
BODY MASS INDEX: 40 KG/M2 | HEIGHT: 65 IN | OXYGEN SATURATION: 100 % | RESPIRATION RATE: 18 BRPM | HEART RATE: 65 BPM | DIASTOLIC BLOOD PRESSURE: 89 MMHG | SYSTOLIC BLOOD PRESSURE: 138 MMHG | WEIGHT: 240.06 LBS

## 2021-01-12 DIAGNOSIS — G89.29 CHRONIC PAIN OF BOTH KNEES: ICD-10-CM

## 2021-01-12 DIAGNOSIS — M53.3 SACROILIAC JOINT PAIN: ICD-10-CM

## 2021-01-12 DIAGNOSIS — M51.36 DDD (DEGENERATIVE DISC DISEASE), LUMBAR: ICD-10-CM

## 2021-01-12 DIAGNOSIS — M79.18 MYOFASCIAL PAIN: ICD-10-CM

## 2021-01-12 DIAGNOSIS — M25.561 CHRONIC PAIN OF BOTH KNEES: ICD-10-CM

## 2021-01-12 DIAGNOSIS — Z96.653 STATUS POST TOTAL BILATERAL KNEE REPLACEMENT: ICD-10-CM

## 2021-01-12 DIAGNOSIS — Z79.891 ENCOUNTER FOR MONITORING OPIOID MAINTENANCE THERAPY: ICD-10-CM

## 2021-01-12 DIAGNOSIS — M25.562 CHRONIC PAIN OF BOTH KNEES: ICD-10-CM

## 2021-01-12 DIAGNOSIS — G89.4 CHRONIC PAIN SYNDROME: Primary | ICD-10-CM

## 2021-01-12 DIAGNOSIS — M47.817 SPONDYLOSIS OF LUMBOSACRAL REGION WITHOUT MYELOPATHY OR RADICULOPATHY: ICD-10-CM

## 2021-01-12 DIAGNOSIS — Z51.81 ENCOUNTER FOR MONITORING OPIOID MAINTENANCE THERAPY: ICD-10-CM

## 2021-01-12 PROCEDURE — 99214 OFFICE O/P EST MOD 30 MIN: CPT | Mod: S$PBB,,, | Performed by: NURSE PRACTITIONER

## 2021-01-12 PROCEDURE — 99214 PR OFFICE/OUTPT VISIT, EST, LEVL IV, 30-39 MIN: ICD-10-PCS | Mod: S$PBB,,, | Performed by: NURSE PRACTITIONER

## 2021-01-12 PROCEDURE — 80307 DRUG TEST PRSMV CHEM ANLYZR: CPT

## 2021-01-12 PROCEDURE — 99999 PR PBB SHADOW E&M-EST. PATIENT-LVL IV: ICD-10-PCS | Mod: PBBFAC,,, | Performed by: NURSE PRACTITIONER

## 2021-01-12 PROCEDURE — 99999 PR PBB SHADOW E&M-EST. PATIENT-LVL IV: CPT | Mod: PBBFAC,,, | Performed by: NURSE PRACTITIONER

## 2021-01-12 PROCEDURE — 99214 OFFICE O/P EST MOD 30 MIN: CPT | Mod: PBBFAC | Performed by: NURSE PRACTITIONER

## 2021-01-12 RX ORDER — METHOCARBAMOL 500 MG/1
500 TABLET, FILM COATED ORAL 3 TIMES DAILY PRN
Qty: 30 TABLET | Refills: 1 | Status: SHIPPED | OUTPATIENT
Start: 2021-01-12 | End: 2021-01-22

## 2021-01-12 RX ORDER — OXYCODONE AND ACETAMINOPHEN 10; 325 MG/1; MG/1
1 TABLET ORAL EVERY 8 HOURS PRN
Qty: 90 TABLET | Refills: 0 | Status: SHIPPED | OUTPATIENT
Start: 2021-01-12 | End: 2021-02-11 | Stop reason: SDUPTHER

## 2021-01-17 LAB

## 2021-02-11 DIAGNOSIS — G89.4 CHRONIC PAIN SYNDROME: Primary | ICD-10-CM

## 2021-02-11 DIAGNOSIS — M51.36 DDD (DEGENERATIVE DISC DISEASE), LUMBAR: ICD-10-CM

## 2021-02-11 DIAGNOSIS — Z96.653 STATUS POST TOTAL BILATERAL KNEE REPLACEMENT: ICD-10-CM

## 2021-02-11 DIAGNOSIS — M25.561 CHRONIC PAIN OF BOTH KNEES: ICD-10-CM

## 2021-02-11 DIAGNOSIS — G89.29 CHRONIC PAIN OF BOTH KNEES: ICD-10-CM

## 2021-02-11 DIAGNOSIS — M47.816 LUMBAR SPONDYLOSIS: ICD-10-CM

## 2021-02-11 DIAGNOSIS — M53.3 SACROILIAC JOINT PAIN: ICD-10-CM

## 2021-02-11 DIAGNOSIS — M25.562 CHRONIC PAIN OF BOTH KNEES: ICD-10-CM

## 2021-02-11 RX ORDER — OXYCODONE AND ACETAMINOPHEN 10; 325 MG/1; MG/1
1 TABLET ORAL EVERY 8 HOURS PRN
Qty: 90 TABLET | Refills: 0 | Status: SHIPPED | OUTPATIENT
Start: 2021-02-11 | End: 2021-03-11 | Stop reason: SDUPTHER

## 2021-02-22 ENCOUNTER — OFFICE VISIT (OUTPATIENT)
Dept: PODIATRY | Facility: CLINIC | Age: 56
End: 2021-02-22
Payer: MEDICARE

## 2021-02-22 VITALS
DIASTOLIC BLOOD PRESSURE: 67 MMHG | BODY MASS INDEX: 40 KG/M2 | HEART RATE: 83 BPM | HEIGHT: 65 IN | WEIGHT: 240.06 LBS | SYSTOLIC BLOOD PRESSURE: 124 MMHG

## 2021-02-22 DIAGNOSIS — M79.671 RIGHT FOOT PAIN: ICD-10-CM

## 2021-02-22 DIAGNOSIS — M67.471 GANGLION CYST OF RIGHT FOOT: ICD-10-CM

## 2021-02-22 DIAGNOSIS — B35.1 DERMATOPHYTOSIS OF NAIL: ICD-10-CM

## 2021-02-22 DIAGNOSIS — M77.8 ENTHESOPATHY OF FOOT: Primary | ICD-10-CM

## 2021-02-22 DIAGNOSIS — E11.40 TYPE 2 DIABETES MELLITUS WITH DIABETIC NEUROPATHY, UNSPECIFIED WHETHER LONG TERM INSULIN USE: ICD-10-CM

## 2021-02-22 PROCEDURE — 99999 PR PBB SHADOW E&M-EST. PATIENT-LVL III: CPT | Mod: PBBFAC,,, | Performed by: PODIATRIST

## 2021-02-22 PROCEDURE — 99999 PR PBB SHADOW E&M-EST. PATIENT-LVL III: ICD-10-PCS | Mod: PBBFAC,,, | Performed by: PODIATRIST

## 2021-02-22 PROCEDURE — 99213 PR OFFICE/OUTPT VISIT, EST, LEVL III, 20-29 MIN: ICD-10-PCS | Mod: 25,S$PBB,, | Performed by: PODIATRIST

## 2021-02-22 PROCEDURE — 11721 DEBRIDE NAIL 6 OR MORE: CPT | Mod: Q9,PBBFAC,PN | Performed by: PODIATRIST

## 2021-02-22 PROCEDURE — 11721 ROUTINE FOOT CARE: ICD-10-PCS | Mod: Q9,S$PBB,, | Performed by: PODIATRIST

## 2021-02-22 PROCEDURE — 99213 OFFICE O/P EST LOW 20 MIN: CPT | Mod: 25,S$PBB,, | Performed by: PODIATRIST

## 2021-02-22 PROCEDURE — 99213 OFFICE O/P EST LOW 20 MIN: CPT | Mod: PBBFAC,PN | Performed by: PODIATRIST

## 2021-02-22 RX ORDER — DICLOFENAC SODIUM 10 MG/G
2 GEL TOPICAL 4 TIMES DAILY PRN
Qty: 500 G | Refills: 5 | Status: SHIPPED | OUTPATIENT
Start: 2021-02-22

## 2021-02-23 ENCOUNTER — TELEPHONE (OUTPATIENT)
Dept: BARIATRICS | Facility: CLINIC | Age: 56
End: 2021-02-23

## 2021-03-02 ENCOUNTER — PES CALL (OUTPATIENT)
Dept: ADMINISTRATIVE | Facility: CLINIC | Age: 56
End: 2021-03-02

## 2021-03-11 DIAGNOSIS — M25.562 CHRONIC PAIN OF BOTH KNEES: ICD-10-CM

## 2021-03-11 DIAGNOSIS — M25.561 CHRONIC PAIN OF BOTH KNEES: ICD-10-CM

## 2021-03-11 DIAGNOSIS — M53.3 SACROILIAC JOINT PAIN: ICD-10-CM

## 2021-03-11 DIAGNOSIS — M47.816 LUMBAR SPONDYLOSIS: ICD-10-CM

## 2021-03-11 DIAGNOSIS — G89.29 CHRONIC PAIN OF BOTH KNEES: ICD-10-CM

## 2021-03-11 DIAGNOSIS — Z96.653 STATUS POST TOTAL BILATERAL KNEE REPLACEMENT: ICD-10-CM

## 2021-03-11 DIAGNOSIS — M51.36 DDD (DEGENERATIVE DISC DISEASE), LUMBAR: ICD-10-CM

## 2021-03-11 DIAGNOSIS — G89.4 CHRONIC PAIN SYNDROME: ICD-10-CM

## 2021-03-11 RX ORDER — OXYCODONE AND ACETAMINOPHEN 10; 325 MG/1; MG/1
1 TABLET ORAL EVERY 8 HOURS PRN
Qty: 90 TABLET | Refills: 0 | Status: SHIPPED | OUTPATIENT
Start: 2021-03-13 | End: 2021-04-05 | Stop reason: SDUPTHER

## 2021-03-29 ENCOUNTER — PES CALL (OUTPATIENT)
Dept: ADMINISTRATIVE | Facility: CLINIC | Age: 56
End: 2021-03-29

## 2021-03-29 ENCOUNTER — OFFICE VISIT (OUTPATIENT)
Dept: BARIATRICS | Facility: CLINIC | Age: 56
End: 2021-03-29
Payer: MEDICARE

## 2021-03-29 VITALS
WEIGHT: 247.56 LBS | HEIGHT: 65 IN | DIASTOLIC BLOOD PRESSURE: 72 MMHG | BODY MASS INDEX: 41.25 KG/M2 | SYSTOLIC BLOOD PRESSURE: 126 MMHG

## 2021-03-29 DIAGNOSIS — M10.9 GOUT, UNSPECIFIED CAUSE, UNSPECIFIED CHRONICITY, UNSPECIFIED SITE: ICD-10-CM

## 2021-03-29 DIAGNOSIS — L98.7 EXCESS SKIN: Primary | ICD-10-CM

## 2021-03-29 DIAGNOSIS — R63.4 WEIGHT LOSS: ICD-10-CM

## 2021-03-29 DIAGNOSIS — Z98.84 S/P GASTRIC BYPASS: ICD-10-CM

## 2021-03-29 PROCEDURE — 99213 OFFICE O/P EST LOW 20 MIN: CPT | Mod: S$PBB,,, | Performed by: PHYSICIAN ASSISTANT

## 2021-03-29 PROCEDURE — 99999 PR PBB SHADOW E&M-EST. PATIENT-LVL III: CPT | Mod: PBBFAC,,, | Performed by: PHYSICIAN ASSISTANT

## 2021-03-29 PROCEDURE — 99999 PR PBB SHADOW E&M-EST. PATIENT-LVL III: ICD-10-PCS | Mod: PBBFAC,,, | Performed by: PHYSICIAN ASSISTANT

## 2021-03-29 PROCEDURE — 99213 OFFICE O/P EST LOW 20 MIN: CPT | Mod: PBBFAC | Performed by: PHYSICIAN ASSISTANT

## 2021-03-29 PROCEDURE — 99213 PR OFFICE/OUTPT VISIT, EST, LEVL III, 20-29 MIN: ICD-10-PCS | Mod: S$PBB,,, | Performed by: PHYSICIAN ASSISTANT

## 2021-03-29 RX ORDER — COLCHICINE 0.6 MG/1
TABLET ORAL
Qty: 60 TABLET | Refills: 11 | Status: SHIPPED | OUTPATIENT
Start: 2021-03-29 | End: 2021-05-03 | Stop reason: SDUPTHER

## 2021-04-05 ENCOUNTER — OFFICE VISIT (OUTPATIENT)
Dept: PAIN MEDICINE | Facility: CLINIC | Age: 56
End: 2021-04-05
Payer: MEDICARE

## 2021-04-05 ENCOUNTER — TELEPHONE (OUTPATIENT)
Dept: PAIN MEDICINE | Facility: CLINIC | Age: 56
End: 2021-04-05

## 2021-04-05 VITALS
HEART RATE: 79 BPM | BODY MASS INDEX: 40.77 KG/M2 | DIASTOLIC BLOOD PRESSURE: 79 MMHG | WEIGHT: 244.69 LBS | RESPIRATION RATE: 18 BRPM | OXYGEN SATURATION: 99 % | SYSTOLIC BLOOD PRESSURE: 123 MMHG | HEIGHT: 65 IN | TEMPERATURE: 98 F

## 2021-04-05 DIAGNOSIS — Z51.81 ENCOUNTER FOR MONITORING OPIOID MAINTENANCE THERAPY: ICD-10-CM

## 2021-04-05 DIAGNOSIS — M51.36 DDD (DEGENERATIVE DISC DISEASE), LUMBAR: ICD-10-CM

## 2021-04-05 DIAGNOSIS — Z79.891 ENCOUNTER FOR MONITORING OPIOID MAINTENANCE THERAPY: ICD-10-CM

## 2021-04-05 DIAGNOSIS — Z96.653 STATUS POST TOTAL BILATERAL KNEE REPLACEMENT: ICD-10-CM

## 2021-04-05 DIAGNOSIS — G89.4 CHRONIC PAIN SYNDROME: ICD-10-CM

## 2021-04-05 DIAGNOSIS — M25.562 CHRONIC PAIN OF BOTH KNEES: ICD-10-CM

## 2021-04-05 DIAGNOSIS — M75.52 SUBACROMIAL BURSITIS OF LEFT SHOULDER JOINT: Primary | ICD-10-CM

## 2021-04-05 DIAGNOSIS — M53.3 SACROILIAC JOINT PAIN: ICD-10-CM

## 2021-04-05 DIAGNOSIS — M47.816 LUMBAR SPONDYLOSIS: ICD-10-CM

## 2021-04-05 DIAGNOSIS — M47.817 SPONDYLOSIS OF LUMBOSACRAL REGION WITHOUT MYELOPATHY OR RADICULOPATHY: ICD-10-CM

## 2021-04-05 DIAGNOSIS — M25.561 CHRONIC PAIN OF BOTH KNEES: ICD-10-CM

## 2021-04-05 DIAGNOSIS — G89.29 CHRONIC PAIN OF BOTH KNEES: ICD-10-CM

## 2021-04-05 PROCEDURE — 99999 PR PBB SHADOW E&M-EST. PATIENT-LVL V: CPT | Mod: PBBFAC,,, | Performed by: NURSE PRACTITIONER

## 2021-04-05 PROCEDURE — 99999 PR PBB SHADOW E&M-EST. PATIENT-LVL V: ICD-10-PCS | Mod: PBBFAC,,, | Performed by: NURSE PRACTITIONER

## 2021-04-05 PROCEDURE — 99214 OFFICE O/P EST MOD 30 MIN: CPT | Mod: S$PBB,,, | Performed by: NURSE PRACTITIONER

## 2021-04-05 PROCEDURE — 99215 OFFICE O/P EST HI 40 MIN: CPT | Mod: PBBFAC | Performed by: NURSE PRACTITIONER

## 2021-04-05 PROCEDURE — 99214 PR OFFICE/OUTPT VISIT, EST, LEVL IV, 30-39 MIN: ICD-10-PCS | Mod: S$PBB,,, | Performed by: NURSE PRACTITIONER

## 2021-04-06 RX ORDER — OXYCODONE AND ACETAMINOPHEN 10; 325 MG/1; MG/1
1 TABLET ORAL EVERY 8 HOURS PRN
Qty: 90 TABLET | Refills: 0 | Status: SHIPPED | OUTPATIENT
Start: 2021-04-10 | End: 2021-05-10 | Stop reason: SDUPTHER

## 2021-04-09 ENCOUNTER — TELEPHONE (OUTPATIENT)
Dept: PLASTIC SURGERY | Facility: CLINIC | Age: 56
End: 2021-04-09

## 2021-04-09 ENCOUNTER — TELEPHONE (OUTPATIENT)
Dept: PAIN MEDICINE | Facility: CLINIC | Age: 56
End: 2021-04-09

## 2021-04-12 ENCOUNTER — TELEPHONE (OUTPATIENT)
Dept: PLASTIC SURGERY | Facility: CLINIC | Age: 56
End: 2021-04-12

## 2021-04-16 ENCOUNTER — TELEPHONE (OUTPATIENT)
Dept: ADMINISTRATIVE | Facility: CLINIC | Age: 56
End: 2021-04-16

## 2021-04-26 ENCOUNTER — PROCEDURE VISIT (OUTPATIENT)
Dept: PAIN MEDICINE | Facility: CLINIC | Age: 56
End: 2021-04-26
Payer: MEDICARE

## 2021-04-26 VITALS
DIASTOLIC BLOOD PRESSURE: 85 MMHG | HEART RATE: 80 BPM | SYSTOLIC BLOOD PRESSURE: 148 MMHG | BODY MASS INDEX: 40.4 KG/M2 | HEIGHT: 65 IN | RESPIRATION RATE: 18 BRPM | OXYGEN SATURATION: 100 % | WEIGHT: 242.5 LBS

## 2021-04-26 DIAGNOSIS — M75.52 SUBACROMIAL BURSITIS OF LEFT SHOULDER JOINT: Primary | ICD-10-CM

## 2021-04-26 DIAGNOSIS — M53.3 SACROILIAC JOINT PAIN: ICD-10-CM

## 2021-04-26 DIAGNOSIS — M47.816 LUMBAR SPONDYLOSIS: ICD-10-CM

## 2021-04-26 DIAGNOSIS — M47.817 SPONDYLOSIS OF LUMBOSACRAL REGION WITHOUT MYELOPATHY OR RADICULOPATHY: ICD-10-CM

## 2021-04-26 PROCEDURE — 20610 DRAIN/INJ JOINT/BURSA W/O US: CPT | Mod: S$PBB,LT,, | Performed by: ANESTHESIOLOGY

## 2021-04-26 PROCEDURE — 20610 DRAIN/INJ JOINT/BURSA W/O US: CPT | Mod: PBBFAC,LT | Performed by: ANESTHESIOLOGY

## 2021-04-26 PROCEDURE — 99212 PR OFFICE/OUTPT VISIT, EST, LEVL II, 10-19 MIN: ICD-10-PCS | Mod: 25,S$PBB,, | Performed by: ANESTHESIOLOGY

## 2021-04-26 PROCEDURE — 20610 PR DRAIN/INJECT LARGE JOINT/BURSA: ICD-10-PCS | Mod: S$PBB,LT,, | Performed by: ANESTHESIOLOGY

## 2021-04-26 PROCEDURE — 99212 OFFICE O/P EST SF 10 MIN: CPT | Mod: 25,S$PBB,, | Performed by: ANESTHESIOLOGY

## 2021-04-26 RX ORDER — TRIAMCINOLONE ACETONIDE 40 MG/ML
40 INJECTION, SUSPENSION INTRA-ARTICULAR; INTRAMUSCULAR
Status: COMPLETED | OUTPATIENT
Start: 2021-04-26 | End: 2021-04-26

## 2021-04-26 RX ADMIN — TRIAMCINOLONE ACETONIDE 40 MG: 40 INJECTION, SUSPENSION INTRA-ARTICULAR; INTRAMUSCULAR at 03:04

## 2021-04-28 ENCOUNTER — OFFICE VISIT (OUTPATIENT)
Dept: PLASTIC SURGERY | Facility: CLINIC | Age: 56
End: 2021-04-28
Payer: MEDICARE

## 2021-04-28 VITALS
SYSTOLIC BLOOD PRESSURE: 125 MMHG | DIASTOLIC BLOOD PRESSURE: 78 MMHG | WEIGHT: 242.5 LBS | HEART RATE: 80 BPM | HEIGHT: 65 IN | BODY MASS INDEX: 40.4 KG/M2

## 2021-04-28 DIAGNOSIS — R21 EXCORIATED RASH: ICD-10-CM

## 2021-04-28 DIAGNOSIS — E65 PANNUS, ABDOMINAL: Primary | ICD-10-CM

## 2021-04-28 DIAGNOSIS — G89.29 CHRONIC LOW BACK PAIN WITHOUT SCIATICA, UNSPECIFIED BACK PAIN LATERALITY: ICD-10-CM

## 2021-04-28 DIAGNOSIS — M54.50 CHRONIC LOW BACK PAIN WITHOUT SCIATICA, UNSPECIFIED BACK PAIN LATERALITY: ICD-10-CM

## 2021-04-28 PROCEDURE — 99213 OFFICE O/P EST LOW 20 MIN: CPT | Mod: PBBFAC | Performed by: SURGERY

## 2021-04-28 PROCEDURE — 99203 OFFICE O/P NEW LOW 30 MIN: CPT | Mod: S$PBB,,, | Performed by: SURGERY

## 2021-04-28 PROCEDURE — 99999 PR PBB SHADOW E&M-EST. PATIENT-LVL III: CPT | Mod: PBBFAC,,, | Performed by: SURGERY

## 2021-04-28 PROCEDURE — 99203 PR OFFICE/OUTPT VISIT, NEW, LEVL III, 30-44 MIN: ICD-10-PCS | Mod: S$PBB,,, | Performed by: SURGERY

## 2021-04-28 PROCEDURE — 99999 PR PBB SHADOW E&M-EST. PATIENT-LVL III: ICD-10-PCS | Mod: PBBFAC,,, | Performed by: SURGERY

## 2021-05-03 ENCOUNTER — OFFICE VISIT (OUTPATIENT)
Dept: PODIATRY | Facility: CLINIC | Age: 56
End: 2021-05-03
Payer: MEDICARE

## 2021-05-03 ENCOUNTER — OFFICE VISIT (OUTPATIENT)
Dept: OBSTETRICS AND GYNECOLOGY | Facility: CLINIC | Age: 56
End: 2021-05-03
Payer: MEDICARE

## 2021-05-03 ENCOUNTER — OFFICE VISIT (OUTPATIENT)
Dept: INTERNAL MEDICINE | Facility: CLINIC | Age: 56
End: 2021-05-03
Payer: MEDICARE

## 2021-05-03 ENCOUNTER — LAB VISIT (OUTPATIENT)
Dept: LAB | Facility: HOSPITAL | Age: 56
End: 2021-05-03
Attending: INTERNAL MEDICINE
Payer: MEDICARE

## 2021-05-03 VITALS
DIASTOLIC BLOOD PRESSURE: 80 MMHG | WEIGHT: 249.31 LBS | BODY MASS INDEX: 41.54 KG/M2 | SYSTOLIC BLOOD PRESSURE: 132 MMHG | HEART RATE: 63 BPM | OXYGEN SATURATION: 98 % | TEMPERATURE: 98 F | HEIGHT: 65 IN

## 2021-05-03 VITALS
DIASTOLIC BLOOD PRESSURE: 61 MMHG | HEIGHT: 65 IN | SYSTOLIC BLOOD PRESSURE: 112 MMHG | HEART RATE: 72 BPM | WEIGHT: 246 LBS | BODY MASS INDEX: 40.98 KG/M2

## 2021-05-03 VITALS
WEIGHT: 246.94 LBS | SYSTOLIC BLOOD PRESSURE: 130 MMHG | BODY MASS INDEX: 41.14 KG/M2 | DIASTOLIC BLOOD PRESSURE: 80 MMHG | HEIGHT: 65 IN

## 2021-05-03 DIAGNOSIS — E46 MALNUTRITION, UNSPECIFIED TYPE: ICD-10-CM

## 2021-05-03 DIAGNOSIS — N39.0 URINARY TRACT INFECTION WITHOUT HEMATURIA, SITE UNSPECIFIED: ICD-10-CM

## 2021-05-03 DIAGNOSIS — D25.9 UTERINE LEIOMYOMA, UNSPECIFIED LOCATION: ICD-10-CM

## 2021-05-03 DIAGNOSIS — E11.49 TYPE II DIABETES MELLITUS WITH NEUROLOGICAL MANIFESTATIONS: ICD-10-CM

## 2021-05-03 DIAGNOSIS — E11.65 UNCONTROLLED TYPE 2 DIABETES MELLITUS WITH HYPERGLYCEMIA: ICD-10-CM

## 2021-05-03 DIAGNOSIS — Z12.31 VISIT FOR SCREENING MAMMOGRAM: ICD-10-CM

## 2021-05-03 DIAGNOSIS — Z01.419 ENCOUNTER FOR GYNECOLOGICAL EXAMINATION WITHOUT ABNORMAL FINDING: Primary | ICD-10-CM

## 2021-05-03 DIAGNOSIS — L84 CORNS AND CALLUS: ICD-10-CM

## 2021-05-03 DIAGNOSIS — M10.9 GOUT, UNSPECIFIED CAUSE, UNSPECIFIED CHRONICITY, UNSPECIFIED SITE: ICD-10-CM

## 2021-05-03 DIAGNOSIS — G47.33 OSA ON CPAP: Primary | ICD-10-CM

## 2021-05-03 DIAGNOSIS — B35.1 DERMATOPHYTOSIS OF NAIL: Primary | ICD-10-CM

## 2021-05-03 DIAGNOSIS — R32 URINARY INCONTINENCE, UNSPECIFIED TYPE: ICD-10-CM

## 2021-05-03 DIAGNOSIS — D64.9 ANEMIA, UNSPECIFIED TYPE: ICD-10-CM

## 2021-05-03 DIAGNOSIS — M85.80 OSTEOPENIA, UNSPECIFIED LOCATION: ICD-10-CM

## 2021-05-03 DIAGNOSIS — M81.0 AGE-RELATED OSTEOPOROSIS WITHOUT CURRENT PATHOLOGICAL FRACTURE: ICD-10-CM

## 2021-05-03 LAB
ALBUMIN/CREAT UR: 4.7 UG/MG (ref 0–30)
BACTERIA #/AREA URNS AUTO: NORMAL /HPF
BASOPHILS # BLD AUTO: 0.03 K/UL (ref 0–0.2)
BASOPHILS NFR BLD: 0.5 % (ref 0–1.9)
BILIRUB UR QL STRIP: NEGATIVE
CLARITY UR REFRACT.AUTO: ABNORMAL
COLOR UR AUTO: YELLOW
CREAT UR-MCNC: 86 MG/DL (ref 15–325)
DIFFERENTIAL METHOD: ABNORMAL
EOSINOPHIL # BLD AUTO: 0.1 K/UL (ref 0–0.5)
EOSINOPHIL NFR BLD: 1.6 % (ref 0–8)
ERYTHROCYTE [DISTWIDTH] IN BLOOD BY AUTOMATED COUNT: 14.3 % (ref 11.5–14.5)
ESTIMATED AVG GLUCOSE: 140 MG/DL (ref 68–131)
GLUCOSE UR QL STRIP: NEGATIVE
HBA1C MFR BLD: 6.5 % (ref 4–5.6)
HCT VFR BLD AUTO: 38.2 % (ref 37–48.5)
HGB BLD-MCNC: 12.2 G/DL (ref 12–16)
HGB UR QL STRIP: NEGATIVE
IMM GRANULOCYTES # BLD AUTO: 0.01 K/UL (ref 0–0.04)
IMM GRANULOCYTES NFR BLD AUTO: 0.2 % (ref 0–0.5)
KETONES UR QL STRIP: NEGATIVE
LEUKOCYTE ESTERASE UR QL STRIP: NEGATIVE
LYMPHOCYTES # BLD AUTO: 1.9 K/UL (ref 1–4.8)
LYMPHOCYTES NFR BLD: 34.8 % (ref 18–48)
MCH RBC QN AUTO: 31 PG (ref 27–31)
MCHC RBC AUTO-ENTMCNC: 31.9 G/DL (ref 32–36)
MCV RBC AUTO: 97 FL (ref 82–98)
MICROALBUMIN UR DL<=1MG/L-MCNC: 4 UG/ML
MICROSCOPIC COMMENT: NORMAL
MONOCYTES # BLD AUTO: 0.6 K/UL (ref 0.3–1)
MONOCYTES NFR BLD: 10.8 % (ref 4–15)
NEUTROPHILS # BLD AUTO: 2.9 K/UL (ref 1.8–7.7)
NEUTROPHILS NFR BLD: 52.1 % (ref 38–73)
NITRITE UR QL STRIP: NEGATIVE
NRBC BLD-RTO: 0 /100 WBC
PH UR STRIP: 6 [PH] (ref 5–8)
PLATELET # BLD AUTO: 250 K/UL (ref 150–450)
PMV BLD AUTO: 11.8 FL (ref 9.2–12.9)
PROT UR QL STRIP: NEGATIVE
RBC # BLD AUTO: 3.93 M/UL (ref 4–5.4)
SP GR UR STRIP: 1.02 (ref 1–1.03)
SQUAMOUS #/AREA URNS AUTO: 2 /HPF
URN SPEC COLLECT METH UR: ABNORMAL
WBC # BLD AUTO: 5.58 K/UL (ref 3.9–12.7)

## 2021-05-03 PROCEDURE — 11055 PARING/CUTG B9 HYPRKER LES 1: CPT | Mod: Q9,PBBFAC,PN | Performed by: PODIATRIST

## 2021-05-03 PROCEDURE — 82728 ASSAY OF FERRITIN: CPT | Performed by: INTERNAL MEDICINE

## 2021-05-03 PROCEDURE — 83036 HEMOGLOBIN GLYCOSYLATED A1C: CPT | Performed by: INTERNAL MEDICINE

## 2021-05-03 PROCEDURE — 99215 OFFICE O/P EST HI 40 MIN: CPT | Mod: PBBFAC | Performed by: INTERNAL MEDICINE

## 2021-05-03 PROCEDURE — 99999 PR PBB SHADOW E&M-EST. PATIENT-LVL IV: CPT | Mod: PBBFAC,,, | Performed by: OBSTETRICS & GYNECOLOGY

## 2021-05-03 PROCEDURE — 99214 OFFICE O/P EST MOD 30 MIN: CPT | Mod: PBBFAC,27,PN,25 | Performed by: PODIATRIST

## 2021-05-03 PROCEDURE — 85025 COMPLETE CBC W/AUTO DIFF WBC: CPT | Performed by: INTERNAL MEDICINE

## 2021-05-03 PROCEDURE — 84134 ASSAY OF PREALBUMIN: CPT | Performed by: INTERNAL MEDICINE

## 2021-05-03 PROCEDURE — 80053 COMPREHEN METABOLIC PANEL: CPT | Performed by: INTERNAL MEDICINE

## 2021-05-03 PROCEDURE — 99999 PR PBB SHADOW E&M-EST. PATIENT-LVL IV: CPT | Mod: PBBFAC,,, | Performed by: PODIATRIST

## 2021-05-03 PROCEDURE — 36415 COLL VENOUS BLD VENIPUNCTURE: CPT | Performed by: INTERNAL MEDICINE

## 2021-05-03 PROCEDURE — 82043 UR ALBUMIN QUANTITATIVE: CPT | Performed by: INTERNAL MEDICINE

## 2021-05-03 PROCEDURE — 81001 URINALYSIS AUTO W/SCOPE: CPT | Performed by: INTERNAL MEDICINE

## 2021-05-03 PROCEDURE — 99214 PR OFFICE/OUTPT VISIT, EST, LEVL IV, 30-39 MIN: ICD-10-PCS | Mod: S$PBB,,, | Performed by: INTERNAL MEDICINE

## 2021-05-03 PROCEDURE — G0101 CA SCREEN;PELVIC/BREAST EXAM: HCPCS | Mod: S$PBB,,, | Performed by: OBSTETRICS & GYNECOLOGY

## 2021-05-03 PROCEDURE — 99214 OFFICE O/P EST MOD 30 MIN: CPT | Mod: PBBFAC,25,27,PN | Performed by: OBSTETRICS & GYNECOLOGY

## 2021-05-03 PROCEDURE — 99499 UNLISTED E&M SERVICE: CPT | Mod: S$PBB,,, | Performed by: PODIATRIST

## 2021-05-03 PROCEDURE — G0101 PR CA SCREEN;PELVIC/BREAST EXAM: ICD-10-PCS | Mod: S$PBB,,, | Performed by: OBSTETRICS & GYNECOLOGY

## 2021-05-03 PROCEDURE — 11721 ROUTINE FOOT CARE: ICD-10-PCS | Mod: 59,Q9,S$PBB, | Performed by: PODIATRIST

## 2021-05-03 PROCEDURE — 11055 ROUTINE FOOT CARE: ICD-10-PCS | Mod: Q9,S$PBB,, | Performed by: PODIATRIST

## 2021-05-03 PROCEDURE — 99999 PR PBB SHADOW E&M-EST. PATIENT-LVL IV: ICD-10-PCS | Mod: PBBFAC,,, | Performed by: PODIATRIST

## 2021-05-03 PROCEDURE — 82570 ASSAY OF URINE CREATININE: CPT | Performed by: INTERNAL MEDICINE

## 2021-05-03 PROCEDURE — 99999 PR PBB SHADOW E&M-EST. PATIENT-LVL IV: ICD-10-PCS | Mod: PBBFAC,,, | Performed by: OBSTETRICS & GYNECOLOGY

## 2021-05-03 PROCEDURE — 99214 OFFICE O/P EST MOD 30 MIN: CPT | Mod: S$PBB,,, | Performed by: INTERNAL MEDICINE

## 2021-05-03 PROCEDURE — 88175 CYTOPATH C/V AUTO FLUID REDO: CPT | Performed by: OBSTETRICS & GYNECOLOGY

## 2021-05-03 PROCEDURE — 87624 HPV HI-RISK TYP POOLED RSLT: CPT | Performed by: OBSTETRICS & GYNECOLOGY

## 2021-05-03 PROCEDURE — 83540 ASSAY OF IRON: CPT | Performed by: INTERNAL MEDICINE

## 2021-05-03 PROCEDURE — 87086 URINE CULTURE/COLONY COUNT: CPT | Performed by: INTERNAL MEDICINE

## 2021-05-03 PROCEDURE — 99999 PR PBB SHADOW E&M-EST. PATIENT-LVL V: CPT | Mod: PBBFAC,,, | Performed by: INTERNAL MEDICINE

## 2021-05-03 PROCEDURE — 99999 PR PBB SHADOW E&M-EST. PATIENT-LVL V: ICD-10-PCS | Mod: PBBFAC,,, | Performed by: INTERNAL MEDICINE

## 2021-05-03 PROCEDURE — 11721 DEBRIDE NAIL 6 OR MORE: CPT | Mod: 59,Q9,S$PBB, | Performed by: PODIATRIST

## 2021-05-03 PROCEDURE — 99499 NO LOS: ICD-10-PCS | Mod: S$PBB,,, | Performed by: PODIATRIST

## 2021-05-03 RX ORDER — NYSTATIN 100000 [USP'U]/G
POWDER TOPICAL 2 TIMES DAILY
Qty: 60 G | Refills: 3 | Status: SHIPPED | OUTPATIENT
Start: 2021-05-03

## 2021-05-03 RX ORDER — COLCHICINE 0.6 MG/1
TABLET ORAL
Qty: 60 TABLET | Refills: 11 | Status: SHIPPED | OUTPATIENT
Start: 2021-05-03 | End: 2021-07-12

## 2021-05-03 RX ORDER — LIDOCAINE 50 MG/G
OINTMENT TOPICAL 2 TIMES DAILY
COMMUNITY
Start: 2021-04-09 | End: 2021-07-12

## 2021-05-04 ENCOUNTER — PATIENT MESSAGE (OUTPATIENT)
Dept: INTERNAL MEDICINE | Facility: CLINIC | Age: 56
End: 2021-05-04

## 2021-05-04 LAB
ALBUMIN SERPL BCP-MCNC: 3.9 G/DL (ref 3.5–5.2)
ALP SERPL-CCNC: 59 U/L (ref 55–135)
ALT SERPL W/O P-5'-P-CCNC: 26 U/L (ref 10–44)
ANION GAP SERPL CALC-SCNC: 4 MMOL/L (ref 8–16)
AST SERPL-CCNC: 20 U/L (ref 10–40)
BACTERIA UR CULT: NORMAL
BILIRUB SERPL-MCNC: 0.5 MG/DL (ref 0.1–1)
BUN SERPL-MCNC: 15 MG/DL (ref 6–20)
CALCIUM SERPL-MCNC: 9.6 MG/DL (ref 8.7–10.5)
CHLORIDE SERPL-SCNC: 107 MMOL/L (ref 95–110)
CO2 SERPL-SCNC: 29 MMOL/L (ref 23–29)
CREAT SERPL-MCNC: 0.7 MG/DL (ref 0.5–1.4)
EST. GFR  (AFRICAN AMERICAN): >60 ML/MIN/1.73 M^2
EST. GFR  (NON AFRICAN AMERICAN): >60 ML/MIN/1.73 M^2
FERRITIN SERPL-MCNC: 33 NG/ML (ref 20–300)
GLUCOSE SERPL-MCNC: 113 MG/DL (ref 70–110)
IRON SERPL-MCNC: 84 UG/DL (ref 30–160)
POTASSIUM SERPL-SCNC: 4.2 MMOL/L (ref 3.5–5.1)
PREALB SERPL-MCNC: 12 MG/DL (ref 20–43)
PROT SERPL-MCNC: 7.7 G/DL (ref 6–8.4)
SATURATED IRON: 21 % (ref 20–50)
SODIUM SERPL-SCNC: 140 MMOL/L (ref 136–145)
TOTAL IRON BINDING CAPACITY: 404 UG/DL (ref 250–450)
TRANSFERRIN SERPL-MCNC: 273 MG/DL (ref 200–375)

## 2021-05-07 ENCOUNTER — TELEPHONE (OUTPATIENT)
Dept: UROLOGY | Facility: CLINIC | Age: 56
End: 2021-05-07

## 2021-05-07 LAB
HPV HR 12 DNA SPEC QL NAA+PROBE: NEGATIVE
HPV16 AG SPEC QL: NEGATIVE
HPV18 DNA SPEC QL NAA+PROBE: NEGATIVE

## 2021-05-10 ENCOUNTER — OFFICE VISIT (OUTPATIENT)
Dept: UROGYNECOLOGY | Facility: CLINIC | Age: 56
End: 2021-05-10
Payer: MEDICARE

## 2021-05-10 ENCOUNTER — OFFICE VISIT (OUTPATIENT)
Dept: RHEUMATOLOGY | Facility: CLINIC | Age: 56
End: 2021-05-10
Payer: MEDICARE

## 2021-05-10 ENCOUNTER — PATIENT MESSAGE (OUTPATIENT)
Dept: PAIN MEDICINE | Facility: OTHER | Age: 56
End: 2021-05-10

## 2021-05-10 VITALS
BODY MASS INDEX: 41.99 KG/M2 | OXYGEN SATURATION: 97 % | HEART RATE: 84 BPM | HEIGHT: 65 IN | SYSTOLIC BLOOD PRESSURE: 144 MMHG | WEIGHT: 252 LBS | DIASTOLIC BLOOD PRESSURE: 88 MMHG | RESPIRATION RATE: 18 BRPM

## 2021-05-10 VITALS
HEIGHT: 65 IN | BODY MASS INDEX: 42.02 KG/M2 | WEIGHT: 252.19 LBS | SYSTOLIC BLOOD PRESSURE: 122 MMHG | DIASTOLIC BLOOD PRESSURE: 74 MMHG

## 2021-05-10 DIAGNOSIS — R32 URINARY INCONTINENCE, UNSPECIFIED TYPE: ICD-10-CM

## 2021-05-10 DIAGNOSIS — M25.562 CHRONIC PAIN OF BOTH KNEES: ICD-10-CM

## 2021-05-10 DIAGNOSIS — Z79.1 NSAID LONG-TERM USE: ICD-10-CM

## 2021-05-10 DIAGNOSIS — M15.9 PRIMARY OSTEOARTHRITIS INVOLVING MULTIPLE JOINTS: ICD-10-CM

## 2021-05-10 DIAGNOSIS — Z79.899 ENCOUNTER FOR LONG-TERM (CURRENT) USE OF OTHER MEDICATIONS: ICD-10-CM

## 2021-05-10 DIAGNOSIS — G89.4 CHRONIC PAIN SYNDROME: ICD-10-CM

## 2021-05-10 DIAGNOSIS — M25.561 CHRONIC PAIN OF BOTH KNEES: ICD-10-CM

## 2021-05-10 DIAGNOSIS — M75.22 BICEPS TENDINITIS OF LEFT UPPER EXTREMITY: ICD-10-CM

## 2021-05-10 DIAGNOSIS — E66.01 CLASS 3 SEVERE OBESITY WITH BODY MASS INDEX (BMI) OF 40.0 TO 44.9 IN ADULT, UNSPECIFIED OBESITY TYPE, UNSPECIFIED WHETHER SERIOUS COMORBIDITY PRESENT: ICD-10-CM

## 2021-05-10 DIAGNOSIS — Z96.653 STATUS POST TOTAL BILATERAL KNEE REPLACEMENT: ICD-10-CM

## 2021-05-10 DIAGNOSIS — N39.46 MIXED INCONTINENCE URGE AND STRESS (MALE)(FEMALE): Primary | ICD-10-CM

## 2021-05-10 DIAGNOSIS — M51.36 DDD (DEGENERATIVE DISC DISEASE), LUMBAR: Primary | ICD-10-CM

## 2021-05-10 DIAGNOSIS — G89.29 CHRONIC PAIN OF BOTH KNEES: ICD-10-CM

## 2021-05-10 DIAGNOSIS — Z71.89 COUNSELING AND COORDINATION OF CARE: ICD-10-CM

## 2021-05-10 DIAGNOSIS — M47.816 LUMBAR SPONDYLOSIS: ICD-10-CM

## 2021-05-10 DIAGNOSIS — M53.3 SACROILIAC JOINT PAIN: ICD-10-CM

## 2021-05-10 DIAGNOSIS — M10.9 GOUT, UNSPECIFIED CAUSE, UNSPECIFIED CHRONICITY, UNSPECIFIED SITE: Primary | ICD-10-CM

## 2021-05-10 PROCEDURE — 99214 PR OFFICE/OUTPT VISIT, EST, LEVL IV, 30-39 MIN: ICD-10-PCS | Mod: S$PBB,,, | Performed by: OBSTETRICS & GYNECOLOGY

## 2021-05-10 PROCEDURE — 99204 OFFICE O/P NEW MOD 45 MIN: CPT | Mod: S$PBB,,, | Performed by: INTERNAL MEDICINE

## 2021-05-10 PROCEDURE — 99214 OFFICE O/P EST MOD 30 MIN: CPT | Mod: S$PBB,,, | Performed by: OBSTETRICS & GYNECOLOGY

## 2021-05-10 PROCEDURE — 99214 OFFICE O/P EST MOD 30 MIN: CPT | Mod: PBBFAC,25 | Performed by: OBSTETRICS & GYNECOLOGY

## 2021-05-10 PROCEDURE — 99215 OFFICE O/P EST HI 40 MIN: CPT | Mod: PBBFAC,25,27,PN | Performed by: INTERNAL MEDICINE

## 2021-05-10 PROCEDURE — 99999 PR PBB SHADOW E&M-EST. PATIENT-LVL V: CPT | Mod: PBBFAC,,, | Performed by: INTERNAL MEDICINE

## 2021-05-10 PROCEDURE — 99999 PR PBB SHADOW E&M-EST. PATIENT-LVL V: ICD-10-PCS | Mod: PBBFAC,,, | Performed by: INTERNAL MEDICINE

## 2021-05-10 PROCEDURE — 99204 PR OFFICE/OUTPT VISIT, NEW, LEVL IV, 45-59 MIN: ICD-10-PCS | Mod: S$PBB,,, | Performed by: INTERNAL MEDICINE

## 2021-05-10 PROCEDURE — 99999 PR PBB SHADOW E&M-EST. PATIENT-LVL IV: CPT | Mod: PBBFAC,,, | Performed by: OBSTETRICS & GYNECOLOGY

## 2021-05-10 PROCEDURE — 99999 PR PBB SHADOW E&M-EST. PATIENT-LVL IV: ICD-10-PCS | Mod: PBBFAC,,, | Performed by: OBSTETRICS & GYNECOLOGY

## 2021-05-10 RX ORDER — OXYCODONE AND ACETAMINOPHEN 10; 325 MG/1; MG/1
1 TABLET ORAL EVERY 8 HOURS PRN
Qty: 90 TABLET | Refills: 0 | Status: SHIPPED | OUTPATIENT
Start: 2021-05-10 | End: 2021-06-07 | Stop reason: SDUPTHER

## 2021-05-10 RX ORDER — COLCHICINE 0.6 MG/1
0.6 CAPSULE ORAL 2 TIMES DAILY
Qty: 60 CAPSULE | Refills: 3 | Status: SHIPPED | OUTPATIENT
Start: 2021-05-10 | End: 2021-07-12 | Stop reason: SDUPTHER

## 2021-05-10 RX ORDER — OXYBUTYNIN CHLORIDE 5 MG/1
TABLET, EXTENDED RELEASE ORAL
Qty: 63 TABLET | Refills: 0 | Status: SHIPPED | OUTPATIENT
Start: 2021-05-10 | End: 2021-06-11 | Stop reason: SDUPTHER

## 2021-05-11 ENCOUNTER — PATIENT MESSAGE (OUTPATIENT)
Dept: RHEUMATOLOGY | Facility: CLINIC | Age: 56
End: 2021-05-11

## 2021-05-11 ENCOUNTER — HOSPITAL ENCOUNTER (OUTPATIENT)
Facility: OTHER | Age: 56
Discharge: HOME | End: 2021-05-11
Attending: ANESTHESIOLOGY | Admitting: ANESTHESIOLOGY
Payer: MEDICARE

## 2021-05-11 VITALS
WEIGHT: 242 LBS | BODY MASS INDEX: 40.32 KG/M2 | HEART RATE: 60 BPM | TEMPERATURE: 98 F | OXYGEN SATURATION: 100 % | RESPIRATION RATE: 16 BRPM | SYSTOLIC BLOOD PRESSURE: 136 MMHG | HEIGHT: 65 IN | DIASTOLIC BLOOD PRESSURE: 71 MMHG

## 2021-05-11 DIAGNOSIS — M46.1 SACROILIITIS: Primary | ICD-10-CM

## 2021-05-11 LAB — POCT GLUCOSE: 102 MG/DL (ref 70–110)

## 2021-05-11 PROCEDURE — 27096 INJECT SACROILIAC JOINT: CPT | Mod: 50,,, | Performed by: ANESTHESIOLOGY

## 2021-05-11 PROCEDURE — 63600175 PHARM REV CODE 636 W HCPCS: Performed by: ANESTHESIOLOGY

## 2021-05-11 PROCEDURE — 27096 PR INJECTION,SACROILIAC JOINT: ICD-10-PCS | Mod: 50,,, | Performed by: ANESTHESIOLOGY

## 2021-05-11 PROCEDURE — 27096 INJECT SACROILIAC JOINT: CPT | Mod: 50 | Performed by: ANESTHESIOLOGY

## 2021-05-11 PROCEDURE — 25000003 PHARM REV CODE 250: Performed by: ANESTHESIOLOGY

## 2021-05-11 PROCEDURE — 25500020 PHARM REV CODE 255: Performed by: ANESTHESIOLOGY

## 2021-05-11 RX ORDER — LIDOCAINE HYDROCHLORIDE 10 MG/ML
INJECTION INFILTRATION; PERINEURAL
Status: DISCONTINUED | OUTPATIENT
Start: 2021-05-11 | End: 2021-05-11 | Stop reason: HOSPADM

## 2021-05-11 RX ORDER — SODIUM CHLORIDE 9 MG/ML
INJECTION, SOLUTION INTRAVENOUS CONTINUOUS
Status: DISCONTINUED | OUTPATIENT
Start: 2021-05-11 | End: 2021-05-11 | Stop reason: HOSPADM

## 2021-05-11 RX ORDER — METHYLPREDNISOLONE ACETATE 40 MG/ML
INJECTION, SUSPENSION INTRA-ARTICULAR; INTRALESIONAL; INTRAMUSCULAR; SOFT TISSUE
Status: DISCONTINUED | OUTPATIENT
Start: 2021-05-11 | End: 2021-05-11 | Stop reason: HOSPADM

## 2021-05-12 LAB
FINAL PATHOLOGIC DIAGNOSIS: NORMAL
Lab: NORMAL

## 2021-05-14 ENCOUNTER — TELEPHONE (OUTPATIENT)
Dept: PAIN MEDICINE | Facility: CLINIC | Age: 56
End: 2021-05-14

## 2021-05-17 ENCOUNTER — HOSPITAL ENCOUNTER (OUTPATIENT)
Dept: RADIOLOGY | Facility: OTHER | Age: 56
Discharge: HOME | End: 2021-05-17
Attending: INTERNAL MEDICINE
Payer: MEDICARE

## 2021-05-17 ENCOUNTER — PATIENT MESSAGE (OUTPATIENT)
Dept: INTERNAL MEDICINE | Facility: CLINIC | Age: 56
End: 2021-05-17

## 2021-05-17 ENCOUNTER — OFFICE VISIT (OUTPATIENT)
Dept: SLEEP MEDICINE | Facility: CLINIC | Age: 56
End: 2021-05-17
Payer: MEDICARE

## 2021-05-17 ENCOUNTER — OFFICE VISIT (OUTPATIENT)
Dept: PAIN MEDICINE | Facility: CLINIC | Age: 56
End: 2021-05-17
Payer: MEDICARE

## 2021-05-17 VITALS
SYSTOLIC BLOOD PRESSURE: 141 MMHG | WEIGHT: 242.5 LBS | BODY MASS INDEX: 40.4 KG/M2 | TEMPERATURE: 98 F | RESPIRATION RATE: 18 BRPM | HEART RATE: 68 BPM | HEIGHT: 65 IN | DIASTOLIC BLOOD PRESSURE: 93 MMHG

## 2021-05-17 VITALS
DIASTOLIC BLOOD PRESSURE: 92 MMHG | SYSTOLIC BLOOD PRESSURE: 154 MMHG | HEART RATE: 53 BPM | WEIGHT: 242 LBS | BODY MASS INDEX: 40.32 KG/M2 | HEIGHT: 65 IN

## 2021-05-17 DIAGNOSIS — M85.80 OSTEOPENIA, UNSPECIFIED LOCATION: ICD-10-CM

## 2021-05-17 DIAGNOSIS — G47.10 HYPERSOMNOLENCE: ICD-10-CM

## 2021-05-17 DIAGNOSIS — G47.33 OSA (OBSTRUCTIVE SLEEP APNEA): Primary | ICD-10-CM

## 2021-05-17 DIAGNOSIS — M46.1 SACROILIITIS: Primary | ICD-10-CM

## 2021-05-17 DIAGNOSIS — G47.33 OSA ON CPAP: ICD-10-CM

## 2021-05-17 DIAGNOSIS — M81.0 AGE-RELATED OSTEOPOROSIS WITHOUT CURRENT PATHOLOGICAL FRACTURE: ICD-10-CM

## 2021-05-17 DIAGNOSIS — M47.817 SPONDYLOSIS OF LUMBOSACRAL REGION WITHOUT MYELOPATHY OR RADICULOPATHY: ICD-10-CM

## 2021-05-17 DIAGNOSIS — M53.3 SACROILIAC JOINT PAIN: ICD-10-CM

## 2021-05-17 DIAGNOSIS — M47.816 LUMBAR SPONDYLOSIS: ICD-10-CM

## 2021-05-17 DIAGNOSIS — M51.36 DDD (DEGENERATIVE DISC DISEASE), LUMBAR: ICD-10-CM

## 2021-05-17 DIAGNOSIS — Z79.891 CHRONIC USE OF OPIATE FOR THERAPEUTIC PURPOSE: ICD-10-CM

## 2021-05-17 PROCEDURE — 99204 OFFICE O/P NEW MOD 45 MIN: CPT | Mod: S$PBB,,, | Performed by: INTERNAL MEDICINE

## 2021-05-17 PROCEDURE — 99213 OFFICE O/P EST LOW 20 MIN: CPT | Mod: S$PBB,,, | Performed by: NURSE PRACTITIONER

## 2021-05-17 PROCEDURE — 99999 PR PBB SHADOW E&M-EST. PATIENT-LVL IV: CPT | Mod: PBBFAC,,, | Performed by: NURSE PRACTITIONER

## 2021-05-17 PROCEDURE — 99999 PR PBB SHADOW E&M-EST. PATIENT-LVL IV: ICD-10-PCS | Mod: PBBFAC,,, | Performed by: INTERNAL MEDICINE

## 2021-05-17 PROCEDURE — 99999 PR PBB SHADOW E&M-EST. PATIENT-LVL IV: CPT | Mod: PBBFAC,,, | Performed by: INTERNAL MEDICINE

## 2021-05-17 PROCEDURE — 77080 DEXA BONE DENSITY SPINE HIP: ICD-10-PCS | Mod: 26,,, | Performed by: RADIOLOGY

## 2021-05-17 PROCEDURE — 99999 PR PBB SHADOW E&M-EST. PATIENT-LVL IV: ICD-10-PCS | Mod: PBBFAC,,, | Performed by: NURSE PRACTITIONER

## 2021-05-17 PROCEDURE — 99214 OFFICE O/P EST MOD 30 MIN: CPT | Mod: PBBFAC,25 | Performed by: NURSE PRACTITIONER

## 2021-05-17 PROCEDURE — 77080 DXA BONE DENSITY AXIAL: CPT | Mod: 26,,, | Performed by: RADIOLOGY

## 2021-05-17 PROCEDURE — 77080 DXA BONE DENSITY AXIAL: CPT | Mod: TC

## 2021-05-17 PROCEDURE — 99204 PR OFFICE/OUTPT VISIT, NEW, LEVL IV, 45-59 MIN: ICD-10-PCS | Mod: S$PBB,,, | Performed by: INTERNAL MEDICINE

## 2021-05-17 PROCEDURE — 99214 OFFICE O/P EST MOD 30 MIN: CPT | Mod: PBBFAC,25,27 | Performed by: INTERNAL MEDICINE

## 2021-05-17 PROCEDURE — 99213 PR OFFICE/OUTPT VISIT, EST, LEVL III, 20-29 MIN: ICD-10-PCS | Mod: S$PBB,,, | Performed by: NURSE PRACTITIONER

## 2021-05-19 ENCOUNTER — PATIENT MESSAGE (OUTPATIENT)
Dept: INTERNAL MEDICINE | Facility: CLINIC | Age: 56
End: 2021-05-19

## 2021-05-19 ENCOUNTER — PATIENT MESSAGE (OUTPATIENT)
Dept: UROGYNECOLOGY | Facility: CLINIC | Age: 56
End: 2021-05-19

## 2021-05-19 ENCOUNTER — PATIENT MESSAGE (OUTPATIENT)
Dept: RHEUMATOLOGY | Facility: CLINIC | Age: 56
End: 2021-05-19

## 2021-05-27 DIAGNOSIS — F51.09 OTHER INSOMNIA NOT DUE TO A SUBSTANCE OR KNOWN PHYSIOLOGICAL CONDITION: ICD-10-CM

## 2021-05-27 DIAGNOSIS — R06.83 SNORING: Primary | ICD-10-CM

## 2021-05-31 ENCOUNTER — TELEPHONE (OUTPATIENT)
Dept: SLEEP MEDICINE | Facility: OTHER | Age: 56
End: 2021-05-31

## 2021-06-04 ENCOUNTER — TELEPHONE (OUTPATIENT)
Dept: PAIN MEDICINE | Facility: CLINIC | Age: 56
End: 2021-06-04

## 2021-06-04 ENCOUNTER — TELEPHONE (OUTPATIENT)
Dept: SLEEP MEDICINE | Facility: OTHER | Age: 56
End: 2021-06-04

## 2021-06-07 ENCOUNTER — OFFICE VISIT (OUTPATIENT)
Dept: PAIN MEDICINE | Facility: CLINIC | Age: 56
End: 2021-06-07
Payer: MEDICARE

## 2021-06-07 VITALS
HEART RATE: 60 BPM | DIASTOLIC BLOOD PRESSURE: 99 MMHG | BODY MASS INDEX: 40.98 KG/M2 | TEMPERATURE: 98 F | SYSTOLIC BLOOD PRESSURE: 148 MMHG | OXYGEN SATURATION: 98 % | HEIGHT: 65 IN | RESPIRATION RATE: 18 BRPM | WEIGHT: 246 LBS

## 2021-06-07 DIAGNOSIS — M25.561 CHRONIC PAIN OF BOTH KNEES: ICD-10-CM

## 2021-06-07 DIAGNOSIS — G89.29 CHRONIC PAIN OF BOTH KNEES: Primary | ICD-10-CM

## 2021-06-07 DIAGNOSIS — M51.36 DDD (DEGENERATIVE DISC DISEASE), LUMBAR: ICD-10-CM

## 2021-06-07 DIAGNOSIS — M46.1 SACROILIITIS: ICD-10-CM

## 2021-06-07 DIAGNOSIS — Z96.653 STATUS POST TOTAL BILATERAL KNEE REPLACEMENT: ICD-10-CM

## 2021-06-07 DIAGNOSIS — M47.817 SPONDYLOSIS OF LUMBOSACRAL REGION WITHOUT MYELOPATHY OR RADICULOPATHY: ICD-10-CM

## 2021-06-07 DIAGNOSIS — M25.561 CHRONIC PAIN OF BOTH KNEES: Primary | ICD-10-CM

## 2021-06-07 DIAGNOSIS — G89.29 CHRONIC PAIN OF BOTH KNEES: ICD-10-CM

## 2021-06-07 DIAGNOSIS — M53.3 SACROILIAC JOINT PAIN: ICD-10-CM

## 2021-06-07 DIAGNOSIS — Z51.81 ENCOUNTER FOR MONITORING OPIOID MAINTENANCE THERAPY: ICD-10-CM

## 2021-06-07 DIAGNOSIS — M25.562 CHRONIC PAIN OF BOTH KNEES: Primary | ICD-10-CM

## 2021-06-07 DIAGNOSIS — M47.816 LUMBAR SPONDYLOSIS: ICD-10-CM

## 2021-06-07 DIAGNOSIS — Z79.891 ENCOUNTER FOR MONITORING OPIOID MAINTENANCE THERAPY: ICD-10-CM

## 2021-06-07 DIAGNOSIS — G89.4 CHRONIC PAIN SYNDROME: ICD-10-CM

## 2021-06-07 DIAGNOSIS — M25.562 CHRONIC PAIN OF BOTH KNEES: ICD-10-CM

## 2021-06-07 PROCEDURE — 99214 OFFICE O/P EST MOD 30 MIN: CPT | Mod: S$PBB,,, | Performed by: NURSE PRACTITIONER

## 2021-06-07 PROCEDURE — 80307 DRUG TEST PRSMV CHEM ANLYZR: CPT | Performed by: NURSE PRACTITIONER

## 2021-06-07 PROCEDURE — 99999 PR PBB SHADOW E&M-EST. PATIENT-LVL IV: CPT | Mod: PBBFAC,,, | Performed by: NURSE PRACTITIONER

## 2021-06-07 PROCEDURE — 99214 PR OFFICE/OUTPT VISIT, EST, LEVL IV, 30-39 MIN: ICD-10-PCS | Mod: S$PBB,,, | Performed by: NURSE PRACTITIONER

## 2021-06-07 PROCEDURE — 99214 OFFICE O/P EST MOD 30 MIN: CPT | Mod: PBBFAC | Performed by: NURSE PRACTITIONER

## 2021-06-07 PROCEDURE — 99999 PR PBB SHADOW E&M-EST. PATIENT-LVL IV: ICD-10-PCS | Mod: PBBFAC,,, | Performed by: NURSE PRACTITIONER

## 2021-06-07 RX ORDER — OXYCODONE AND ACETAMINOPHEN 10; 325 MG/1; MG/1
1 TABLET ORAL EVERY 8 HOURS PRN
Qty: 90 TABLET | Refills: 0 | Status: SHIPPED | OUTPATIENT
Start: 2021-06-07 | End: 2021-07-12 | Stop reason: SDUPTHER

## 2021-06-11 ENCOUNTER — PATIENT MESSAGE (OUTPATIENT)
Dept: UROGYNECOLOGY | Facility: CLINIC | Age: 56
End: 2021-06-11

## 2021-06-12 LAB
6MAM UR QL: NOT DETECTED
7AMINOCLONAZEPAM UR QL: NOT DETECTED
A-OH ALPRAZ UR QL: NOT DETECTED
ALPHA-OH-MIDAZOLAM: NOT DETECTED
ALPRAZ UR QL: NOT DETECTED
AMPHET UR QL SCN: NOT DETECTED
ANNOTATION COMMENT IMP: NORMAL
ANNOTATION COMMENT IMP: NORMAL
BARBITURATES UR QL: NOT DETECTED
BUPRENORPHINE UR QL: NOT DETECTED
BZE UR QL: NOT DETECTED
CARBOXYTHC UR QL: NOT DETECTED
CARISOPRODOL UR QL: NOT DETECTED
CLONAZEPAM UR QL: NOT DETECTED
CODEINE UR QL: NOT DETECTED
CREAT UR-MCNC: 93 MG/DL (ref 20–400)
DIAZEPAM UR QL: NOT DETECTED
ETHYL GLUCURONIDE UR QL: NOT DETECTED
FENTANYL UR QL: NOT DETECTED
GABAPENTIN: NOT DETECTED
HYDROCODONE UR QL: NOT DETECTED
HYDROMORPHONE UR QL: NOT DETECTED
LORAZEPAM UR QL: NOT DETECTED
MDA UR QL: NOT DETECTED
MDEA UR QL: NOT DETECTED
MDMA UR QL: NOT DETECTED
ME-PHENIDATE UR QL: NOT DETECTED
METHADONE UR QL: NOT DETECTED
METHAMPHET UR QL: NOT DETECTED
MIDAZOLAM UR QL SCN: NOT DETECTED
MORPHINE UR QL: NOT DETECTED
NALOXONE: NOT DETECTED
NORBUPRENORPHINE UR QL CFM: NOT DETECTED
NORDIAZEPAM UR QL: NOT DETECTED
NORFENTANYL UR QL: NOT DETECTED
NORHYDROCODONE UR QL CFM: NOT DETECTED
NORMEPERIDINE UR QL CFM: NOT DETECTED
NOROXYCODONE UR QL CFM: PRESENT
NOROXYMORPHONE UR QL SCN: NOT DETECTED
OXAZEPAM UR QL: NOT DETECTED
OXYCODONE UR QL: PRESENT
OXYMORPHONE UR QL: PRESENT
PATHOLOGY STUDY: NORMAL
PCP UR QL: NOT DETECTED
PHENTERMINE UR QL: NOT DETECTED
PREGABALIN: NOT DETECTED
SERVICE CMNT-IMP: NORMAL
TAPENTADOL UR QL SCN: NOT DETECTED
TAPENTADOL-O-SULF: NOT DETECTED
TEMAZEPAM UR QL: NOT DETECTED
TRAMADOL UR QL: NOT DETECTED
ZOLPIDEM METABOLITE: NOT DETECTED
ZOLPIDEM UR QL: NOT DETECTED

## 2021-06-14 ENCOUNTER — HOSPITAL ENCOUNTER (OUTPATIENT)
Dept: RADIOLOGY | Facility: OTHER | Age: 56
Discharge: HOME | End: 2021-06-14
Attending: OBSTETRICS & GYNECOLOGY
Payer: MEDICARE

## 2021-06-14 ENCOUNTER — CLINICAL SUPPORT (OUTPATIENT)
Dept: INTERNAL MEDICINE | Facility: CLINIC | Age: 56
End: 2021-06-14
Payer: MEDICARE

## 2021-06-14 DIAGNOSIS — Z12.31 VISIT FOR SCREENING MAMMOGRAM: ICD-10-CM

## 2021-06-14 DIAGNOSIS — Z23 IMMUNIZATION DUE: Primary | ICD-10-CM

## 2021-06-14 DIAGNOSIS — D25.9 UTERINE LEIOMYOMA, UNSPECIFIED LOCATION: ICD-10-CM

## 2021-06-14 PROCEDURE — 76856 US EXAM PELVIC COMPLETE: CPT | Mod: TC

## 2021-06-14 PROCEDURE — 90750 HZV VACC RECOMBINANT IM: CPT | Mod: PBBFAC

## 2021-06-14 PROCEDURE — 90471 IMMUNIZATION ADMIN: CPT | Mod: PBBFAC

## 2021-06-14 PROCEDURE — 77067 SCR MAMMO BI INCL CAD: CPT | Mod: 26,,, | Performed by: RADIOLOGY

## 2021-06-14 PROCEDURE — 77067 SCR MAMMO BI INCL CAD: CPT | Mod: TC

## 2021-06-14 PROCEDURE — 76856 US EXAM PELVIC COMPLETE: CPT | Mod: 26,,, | Performed by: RADIOLOGY

## 2021-06-14 PROCEDURE — 76830 TRANSVAGINAL US NON-OB: CPT | Mod: 26,,, | Performed by: RADIOLOGY

## 2021-06-14 PROCEDURE — 76830 US PELVIS COMP WITH TRANSVAG NON-OB (XPD): ICD-10-PCS | Mod: 26,,, | Performed by: RADIOLOGY

## 2021-06-14 PROCEDURE — 77067 MAMMO DIGITAL SCREENING BILAT WITH TOMO: ICD-10-PCS | Mod: 26,,, | Performed by: RADIOLOGY

## 2021-06-14 PROCEDURE — 76856 US PELVIS COMP WITH TRANSVAG NON-OB (XPD): ICD-10-PCS | Mod: 26,,, | Performed by: RADIOLOGY

## 2021-06-14 PROCEDURE — 77063 BREAST TOMOSYNTHESIS BI: CPT | Mod: 26,,, | Performed by: RADIOLOGY

## 2021-06-14 PROCEDURE — 77063 MAMMO DIGITAL SCREENING BILAT WITH TOMO: ICD-10-PCS | Mod: 26,,, | Performed by: RADIOLOGY

## 2021-06-21 ENCOUNTER — TELEPHONE (OUTPATIENT)
Dept: SLEEP MEDICINE | Facility: CLINIC | Age: 56
End: 2021-06-21

## 2021-06-25 ENCOUNTER — PATIENT MESSAGE (OUTPATIENT)
Dept: PLASTIC SURGERY | Facility: CLINIC | Age: 56
End: 2021-06-25

## 2021-06-29 ENCOUNTER — HOSPITAL ENCOUNTER (OUTPATIENT)
Facility: OTHER | Age: 56
Discharge: HOME | End: 2021-06-29
Attending: ANESTHESIOLOGY | Admitting: ANESTHESIOLOGY
Payer: MEDICARE

## 2021-06-29 VITALS
TEMPERATURE: 98 F | DIASTOLIC BLOOD PRESSURE: 62 MMHG | HEIGHT: 65 IN | BODY MASS INDEX: 40.32 KG/M2 | OXYGEN SATURATION: 96 % | SYSTOLIC BLOOD PRESSURE: 118 MMHG | HEART RATE: 61 BPM | RESPIRATION RATE: 16 BRPM | WEIGHT: 242 LBS

## 2021-06-29 DIAGNOSIS — M25.562 CHRONIC PAIN OF BOTH KNEES: ICD-10-CM

## 2021-06-29 DIAGNOSIS — Z96.653 STATUS POST TOTAL KNEE REPLACEMENT, BILATERAL: Primary | ICD-10-CM

## 2021-06-29 DIAGNOSIS — M25.561 CHRONIC PAIN OF BOTH KNEES: ICD-10-CM

## 2021-06-29 DIAGNOSIS — G89.29 CHRONIC PAIN OF BOTH KNEES: ICD-10-CM

## 2021-06-29 DIAGNOSIS — G89.29 CHRONIC PAIN: ICD-10-CM

## 2021-06-29 LAB — POCT GLUCOSE: 119 MG/DL (ref 70–110)

## 2021-06-29 PROCEDURE — 63600175 PHARM REV CODE 636 W HCPCS: Performed by: ANESTHESIOLOGY

## 2021-06-29 PROCEDURE — 82947 ASSAY GLUCOSE BLOOD QUANT: CPT | Performed by: ANESTHESIOLOGY

## 2021-06-29 PROCEDURE — 25000003 PHARM REV CODE 250: Performed by: ANESTHESIOLOGY

## 2021-06-29 PROCEDURE — 64624 DSTRJ NULYT AGT GNCLR NRV: CPT | Mod: LT | Performed by: ANESTHESIOLOGY

## 2021-06-29 PROCEDURE — 64624 DSTRJ NULYT AGT GNCLR NRV: CPT | Mod: LT,,, | Performed by: ANESTHESIOLOGY

## 2021-06-29 PROCEDURE — 64624 PR DESTRUCT, NEUROLYTIC AGENT, GENICULAR NERVE, W/IMG: ICD-10-PCS | Mod: LT,,, | Performed by: ANESTHESIOLOGY

## 2021-06-29 PROCEDURE — A4649 SURGICAL SUPPLIES: HCPCS | Performed by: ANESTHESIOLOGY

## 2021-06-29 RX ORDER — FENTANYL CITRATE 50 UG/ML
INJECTION, SOLUTION INTRAMUSCULAR; INTRAVENOUS
Status: DISCONTINUED | OUTPATIENT
Start: 2021-06-29 | End: 2021-06-29 | Stop reason: HOSPADM

## 2021-06-29 RX ORDER — LIDOCAINE HYDROCHLORIDE 10 MG/ML
INJECTION INFILTRATION; PERINEURAL
Status: DISCONTINUED | OUTPATIENT
Start: 2021-06-29 | End: 2021-06-29 | Stop reason: HOSPADM

## 2021-06-29 RX ORDER — MIDAZOLAM HYDROCHLORIDE 1 MG/ML
INJECTION INTRAMUSCULAR; INTRAVENOUS
Status: DISCONTINUED | OUTPATIENT
Start: 2021-06-29 | End: 2021-06-29 | Stop reason: HOSPADM

## 2021-06-29 RX ORDER — BUPIVACAINE HYDROCHLORIDE 2.5 MG/ML
INJECTION, SOLUTION EPIDURAL; INFILTRATION; INTRACAUDAL
Status: DISCONTINUED | OUTPATIENT
Start: 2021-06-29 | End: 2021-06-29 | Stop reason: HOSPADM

## 2021-06-29 RX ORDER — SODIUM CHLORIDE 9 MG/ML
INJECTION, SOLUTION INTRAVENOUS CONTINUOUS
Status: DISCONTINUED | OUTPATIENT
Start: 2021-06-29 | End: 2021-06-29 | Stop reason: HOSPADM

## 2021-06-29 RX ADMIN — SODIUM CHLORIDE: 0.9 INJECTION, SOLUTION INTRAVENOUS at 07:06

## 2021-07-12 ENCOUNTER — OFFICE VISIT (OUTPATIENT)
Dept: PODIATRY | Facility: CLINIC | Age: 56
End: 2021-07-12
Payer: MEDICARE

## 2021-07-12 ENCOUNTER — OFFICE VISIT (OUTPATIENT)
Dept: OPTOMETRY | Facility: CLINIC | Age: 56
End: 2021-07-12
Payer: MEDICARE

## 2021-07-12 ENCOUNTER — OFFICE VISIT (OUTPATIENT)
Dept: RHEUMATOLOGY | Facility: CLINIC | Age: 56
End: 2021-07-12
Payer: MEDICARE

## 2021-07-12 VITALS
HEART RATE: 70 BPM | BODY MASS INDEX: 40.32 KG/M2 | HEIGHT: 65 IN | SYSTOLIC BLOOD PRESSURE: 113 MMHG | DIASTOLIC BLOOD PRESSURE: 61 MMHG | WEIGHT: 242 LBS

## 2021-07-12 VITALS
DIASTOLIC BLOOD PRESSURE: 75 MMHG | HEART RATE: 83 BPM | RESPIRATION RATE: 18 BRPM | OXYGEN SATURATION: 98 % | SYSTOLIC BLOOD PRESSURE: 117 MMHG | TEMPERATURE: 98 F

## 2021-07-12 DIAGNOSIS — M15.9 PRIMARY OSTEOARTHRITIS INVOLVING MULTIPLE JOINTS: ICD-10-CM

## 2021-07-12 DIAGNOSIS — E11.49 TYPE II DIABETES MELLITUS WITH NEUROLOGICAL MANIFESTATIONS: ICD-10-CM

## 2021-07-12 DIAGNOSIS — Z79.899 ENCOUNTER FOR LONG-TERM (CURRENT) USE OF OTHER MEDICATIONS: ICD-10-CM

## 2021-07-12 DIAGNOSIS — E11.9 TYPE 2 DIABETES MELLITUS WITHOUT RETINOPATHY: Primary | ICD-10-CM

## 2021-07-12 DIAGNOSIS — Z71.89 COUNSELING AND COORDINATION OF CARE: ICD-10-CM

## 2021-07-12 DIAGNOSIS — H52.203 MYOPIA WITH ASTIGMATISM AND PRESBYOPIA, BILATERAL: ICD-10-CM

## 2021-07-12 DIAGNOSIS — H52.4 MYOPIA WITH ASTIGMATISM AND PRESBYOPIA, BILATERAL: ICD-10-CM

## 2021-07-12 DIAGNOSIS — M10.9 GOUT, UNSPECIFIED CAUSE, UNSPECIFIED CHRONICITY, UNSPECIFIED SITE: Primary | ICD-10-CM

## 2021-07-12 DIAGNOSIS — E66.01 CLASS 3 SEVERE OBESITY WITH BODY MASS INDEX (BMI) OF 40.0 TO 44.9 IN ADULT, UNSPECIFIED OBESITY TYPE, UNSPECIFIED WHETHER SERIOUS COMORBIDITY PRESENT: ICD-10-CM

## 2021-07-12 DIAGNOSIS — B35.1 DERMATOPHYTOSIS OF NAIL: ICD-10-CM

## 2021-07-12 DIAGNOSIS — H25.13 NUCLEAR SCLEROSIS, BILATERAL: ICD-10-CM

## 2021-07-12 DIAGNOSIS — H52.13 MYOPIA WITH ASTIGMATISM AND PRESBYOPIA, BILATERAL: ICD-10-CM

## 2021-07-12 DIAGNOSIS — L84 CORNS AND CALLUS: Primary | ICD-10-CM

## 2021-07-12 PROCEDURE — 92014 COMPRE OPH EXAM EST PT 1/>: CPT | Mod: S$PBB,,, | Performed by: OPTOMETRIST

## 2021-07-12 PROCEDURE — 99214 PR OFFICE/OUTPT VISIT, EST, LEVL IV, 30-39 MIN: ICD-10-PCS | Mod: S$PBB,,, | Performed by: INTERNAL MEDICINE

## 2021-07-12 PROCEDURE — 92014 PR EYE EXAM, EST PATIENT,COMPREHESV: ICD-10-PCS | Mod: S$PBB,,, | Performed by: OPTOMETRIST

## 2021-07-12 PROCEDURE — 99999 PR PBB SHADOW E&M-EST. PATIENT-LVL III: CPT | Mod: PBBFAC,,, | Performed by: OPTOMETRIST

## 2021-07-12 PROCEDURE — 99213 OFFICE O/P EST LOW 20 MIN: CPT | Mod: PBBFAC,27,PN | Performed by: INTERNAL MEDICINE

## 2021-07-12 PROCEDURE — 99214 OFFICE O/P EST MOD 30 MIN: CPT | Mod: PBBFAC,27,PN,25 | Performed by: PODIATRIST

## 2021-07-12 PROCEDURE — 11056 PARNG/CUTG B9 HYPRKR LES 2-4: CPT | Performed by: PODIATRIST

## 2021-07-12 PROCEDURE — 99999 PR PBB SHADOW E&M-EST. PATIENT-LVL III: CPT | Mod: PBBFAC,,, | Performed by: INTERNAL MEDICINE

## 2021-07-12 PROCEDURE — 99499 NO LOS: ICD-10-PCS | Mod: S$PBB,,, | Performed by: PODIATRIST

## 2021-07-12 PROCEDURE — 99213 OFFICE O/P EST LOW 20 MIN: CPT | Mod: PBBFAC,PO | Performed by: OPTOMETRIST

## 2021-07-12 PROCEDURE — 99499 UNLISTED E&M SERVICE: CPT | Mod: S$PBB,,, | Performed by: PODIATRIST

## 2021-07-12 PROCEDURE — 99999 PR PBB SHADOW E&M-EST. PATIENT-LVL IV: ICD-10-PCS | Mod: PBBFAC,,, | Performed by: PODIATRIST

## 2021-07-12 PROCEDURE — 99214 OFFICE O/P EST MOD 30 MIN: CPT | Mod: S$PBB,,, | Performed by: INTERNAL MEDICINE

## 2021-07-12 PROCEDURE — 92015 PR REFRACTION: ICD-10-PCS | Mod: ,,, | Performed by: OPTOMETRIST

## 2021-07-12 PROCEDURE — 92015 DETERMINE REFRACTIVE STATE: CPT | Mod: ,,, | Performed by: OPTOMETRIST

## 2021-07-12 PROCEDURE — 11721 ROUTINE FOOT CARE: ICD-10-PCS | Mod: 59,Q9,S$PBB, | Performed by: PODIATRIST

## 2021-07-12 PROCEDURE — 99999 PR PBB SHADOW E&M-EST. PATIENT-LVL IV: CPT | Mod: PBBFAC,,, | Performed by: PODIATRIST

## 2021-07-12 PROCEDURE — 11721 DEBRIDE NAIL 6 OR MORE: CPT | Mod: 59,Q9,S$PBB, | Performed by: PODIATRIST

## 2021-07-12 PROCEDURE — 99999 PR PBB SHADOW E&M-EST. PATIENT-LVL III: ICD-10-PCS | Mod: PBBFAC,,, | Performed by: OPTOMETRIST

## 2021-07-12 PROCEDURE — 11056 ROUTINE FOOT CARE: ICD-10-PCS | Mod: Q9,S$PBB,, | Performed by: PODIATRIST

## 2021-07-12 PROCEDURE — 99999 PR PBB SHADOW E&M-EST. PATIENT-LVL III: ICD-10-PCS | Mod: PBBFAC,,, | Performed by: INTERNAL MEDICINE

## 2021-07-12 RX ORDER — COLCHICINE 0.6 MG/1
0.6 CAPSULE ORAL DAILY PRN
Qty: 60 CAPSULE | Refills: 3 | Status: SHIPPED | OUTPATIENT
Start: 2021-07-12 | End: 2021-11-02 | Stop reason: SDUPTHER

## 2021-07-12 RX ORDER — ALLOPURINOL 300 MG/1
300 TABLET ORAL 2 TIMES DAILY
Qty: 180 TABLET | Refills: 3 | Status: SHIPPED | OUTPATIENT
Start: 2021-07-12 | End: 2021-11-02 | Stop reason: SDUPTHER

## 2021-07-13 ENCOUNTER — HOSPITAL ENCOUNTER (OUTPATIENT)
Facility: OTHER | Age: 56
Discharge: HOME | End: 2021-07-13
Attending: ANESTHESIOLOGY | Admitting: ANESTHESIOLOGY
Payer: MEDICARE

## 2021-07-13 VITALS
SYSTOLIC BLOOD PRESSURE: 113 MMHG | OXYGEN SATURATION: 96 % | TEMPERATURE: 98 F | DIASTOLIC BLOOD PRESSURE: 71 MMHG | HEART RATE: 61 BPM | RESPIRATION RATE: 13 BRPM

## 2021-07-13 DIAGNOSIS — M25.561 CHRONIC PAIN OF RIGHT KNEE: ICD-10-CM

## 2021-07-13 DIAGNOSIS — M17.9 DJD (DEGENERATIVE JOINT DISEASE) OF KNEE: ICD-10-CM

## 2021-07-13 DIAGNOSIS — M17.11 PRIMARY OSTEOARTHRITIS OF RIGHT KNEE: Primary | ICD-10-CM

## 2021-07-13 DIAGNOSIS — G89.29 CHRONIC PAIN OF RIGHT KNEE: ICD-10-CM

## 2021-07-13 LAB — POCT GLUCOSE: 119 MG/DL (ref 70–110)

## 2021-07-13 PROCEDURE — 64624 PR DESTRUCT, NEUROLYTIC AGENT, GENICULAR NERVE, W/IMG: ICD-10-PCS | Mod: RT,,, | Performed by: ANESTHESIOLOGY

## 2021-07-13 PROCEDURE — 63600175 PHARM REV CODE 636 W HCPCS: Performed by: ANESTHESIOLOGY

## 2021-07-13 PROCEDURE — 64624 DSTRJ NULYT AGT GNCLR NRV: CPT | Mod: RT,,, | Performed by: ANESTHESIOLOGY

## 2021-07-13 PROCEDURE — A4649 SURGICAL SUPPLIES: HCPCS | Performed by: ANESTHESIOLOGY

## 2021-07-13 PROCEDURE — 64624 DSTRJ NULYT AGT GNCLR NRV: CPT | Mod: RT | Performed by: ANESTHESIOLOGY

## 2021-07-13 PROCEDURE — 25000003 PHARM REV CODE 250: Performed by: ANESTHESIOLOGY

## 2021-07-13 PROCEDURE — 25000003 PHARM REV CODE 250: Performed by: STUDENT IN AN ORGANIZED HEALTH CARE EDUCATION/TRAINING PROGRAM

## 2021-07-13 RX ORDER — BUPIVACAINE HYDROCHLORIDE 2.5 MG/ML
INJECTION, SOLUTION EPIDURAL; INFILTRATION; INTRACAUDAL
Status: DISCONTINUED | OUTPATIENT
Start: 2021-07-13 | End: 2021-07-13 | Stop reason: HOSPADM

## 2021-07-13 RX ORDER — MIDAZOLAM HYDROCHLORIDE 1 MG/ML
INJECTION INTRAMUSCULAR; INTRAVENOUS
Status: DISCONTINUED | OUTPATIENT
Start: 2021-07-13 | End: 2021-07-13 | Stop reason: HOSPADM

## 2021-07-13 RX ORDER — LIDOCAINE HYDROCHLORIDE 10 MG/ML
INJECTION INFILTRATION; PERINEURAL
Status: DISCONTINUED | OUTPATIENT
Start: 2021-07-13 | End: 2021-07-13 | Stop reason: HOSPADM

## 2021-07-13 RX ORDER — FENTANYL CITRATE 50 UG/ML
INJECTION, SOLUTION INTRAMUSCULAR; INTRAVENOUS
Status: DISCONTINUED | OUTPATIENT
Start: 2021-07-13 | End: 2021-07-13 | Stop reason: HOSPADM

## 2021-07-13 RX ORDER — SODIUM CHLORIDE 9 MG/ML
INJECTION, SOLUTION INTRAVENOUS CONTINUOUS
Status: DISCONTINUED | OUTPATIENT
Start: 2021-07-13 | End: 2021-07-13 | Stop reason: HOSPADM

## 2021-07-13 RX ADMIN — SODIUM CHLORIDE: 0.9 INJECTION, SOLUTION INTRAVENOUS at 07:07

## 2021-07-16 ENCOUNTER — TELEPHONE (OUTPATIENT)
Dept: SLEEP MEDICINE | Facility: OTHER | Age: 56
End: 2021-07-16

## 2021-07-19 ENCOUNTER — HOSPITAL ENCOUNTER (OUTPATIENT)
Dept: SLEEP MEDICINE | Facility: OTHER | Age: 56
Discharge: HOME | End: 2021-07-19
Attending: INTERNAL MEDICINE
Payer: MEDICARE

## 2021-07-19 DIAGNOSIS — R06.83 SNORING: ICD-10-CM

## 2021-07-19 DIAGNOSIS — F51.09 OTHER INSOMNIA NOT DUE TO A SUBSTANCE OR KNOWN PHYSIOLOGICAL CONDITION: ICD-10-CM

## 2021-07-19 PROCEDURE — 95800 SLP STDY UNATTENDED: CPT | Mod: 26,52,, | Performed by: INTERNAL MEDICINE

## 2021-07-19 PROCEDURE — 95800 PR SLEEP STUDY, UNATTENDED, RECORD HEART RATE/O2 SAT/RESP ANAL/SLEEP TIME: ICD-10-PCS | Mod: 26,52,, | Performed by: INTERNAL MEDICINE

## 2021-07-19 PROCEDURE — 95800 SLP STDY UNATTENDED: CPT

## 2021-07-25 ENCOUNTER — PATIENT MESSAGE (OUTPATIENT)
Dept: SLEEP MEDICINE | Facility: CLINIC | Age: 56
End: 2021-07-25

## 2021-07-25 DIAGNOSIS — G47.33 OSA (OBSTRUCTIVE SLEEP APNEA): Primary | ICD-10-CM

## 2021-08-02 ENCOUNTER — TELEPHONE (OUTPATIENT)
Dept: PAIN MEDICINE | Facility: CLINIC | Age: 56
End: 2021-08-02

## 2021-08-03 ENCOUNTER — OFFICE VISIT (OUTPATIENT)
Dept: PAIN MEDICINE | Facility: CLINIC | Age: 56
End: 2021-08-03
Payer: MEDICARE

## 2021-08-03 VITALS
TEMPERATURE: 98 F | DIASTOLIC BLOOD PRESSURE: 100 MMHG | RESPIRATION RATE: 18 BRPM | HEIGHT: 65 IN | HEART RATE: 65 BPM | BODY MASS INDEX: 41.48 KG/M2 | SYSTOLIC BLOOD PRESSURE: 136 MMHG | WEIGHT: 249 LBS

## 2021-08-03 DIAGNOSIS — G89.4 CHRONIC PAIN SYNDROME: ICD-10-CM

## 2021-08-03 DIAGNOSIS — M25.562 CHRONIC PAIN OF BOTH KNEES: ICD-10-CM

## 2021-08-03 DIAGNOSIS — M51.36 DDD (DEGENERATIVE DISC DISEASE), LUMBAR: ICD-10-CM

## 2021-08-03 DIAGNOSIS — Z96.653 STATUS POST TOTAL BILATERAL KNEE REPLACEMENT: ICD-10-CM

## 2021-08-03 DIAGNOSIS — M25.561 CHRONIC PAIN OF BOTH KNEES: ICD-10-CM

## 2021-08-03 DIAGNOSIS — M53.3 SACROILIAC JOINT PAIN: ICD-10-CM

## 2021-08-03 DIAGNOSIS — M47.816 LUMBAR SPONDYLOSIS: ICD-10-CM

## 2021-08-03 DIAGNOSIS — G89.29 CHRONIC PAIN OF BOTH KNEES: ICD-10-CM

## 2021-08-03 DIAGNOSIS — M47.817 SPONDYLOSIS OF LUMBOSACRAL REGION WITHOUT MYELOPATHY OR RADICULOPATHY: Primary | ICD-10-CM

## 2021-08-03 PROCEDURE — 99999 PR PBB SHADOW E&M-EST. PATIENT-LVL IV: CPT | Mod: PBBFAC,,, | Performed by: NURSE PRACTITIONER

## 2021-08-03 PROCEDURE — 99214 PR OFFICE/OUTPT VISIT, EST, LEVL IV, 30-39 MIN: ICD-10-PCS | Mod: S$PBB,,, | Performed by: NURSE PRACTITIONER

## 2021-08-03 PROCEDURE — 99214 OFFICE O/P EST MOD 30 MIN: CPT | Mod: PBBFAC | Performed by: NURSE PRACTITIONER

## 2021-08-03 PROCEDURE — 99999 PR PBB SHADOW E&M-EST. PATIENT-LVL IV: ICD-10-PCS | Mod: PBBFAC,,, | Performed by: NURSE PRACTITIONER

## 2021-08-03 PROCEDURE — 99214 OFFICE O/P EST MOD 30 MIN: CPT | Mod: S$PBB,,, | Performed by: NURSE PRACTITIONER

## 2021-08-03 RX ORDER — OXYCODONE AND ACETAMINOPHEN 10; 325 MG/1; MG/1
1 TABLET ORAL EVERY 8 HOURS PRN
Qty: 90 TABLET | Refills: 0 | Status: ON HOLD | OUTPATIENT
Start: 2021-08-11 | End: 2021-09-11 | Stop reason: SDUPTHER

## 2021-08-23 ENCOUNTER — PATIENT MESSAGE (OUTPATIENT)
Dept: PAIN MEDICINE | Facility: OTHER | Age: 56
End: 2021-08-23

## 2021-08-24 ENCOUNTER — IMMUNIZATION (OUTPATIENT)
Dept: PHARMACY | Facility: CLINIC | Age: 56
End: 2021-08-24
Payer: MEDICARE

## 2021-08-24 ENCOUNTER — HOSPITAL ENCOUNTER (OUTPATIENT)
Facility: OTHER | Age: 56
Discharge: HOME | End: 2021-08-24
Attending: ANESTHESIOLOGY | Admitting: ANESTHESIOLOGY
Payer: MEDICARE

## 2021-08-24 VITALS
HEIGHT: 65 IN | RESPIRATION RATE: 16 BRPM | SYSTOLIC BLOOD PRESSURE: 120 MMHG | TEMPERATURE: 98 F | BODY MASS INDEX: 39.99 KG/M2 | HEART RATE: 60 BPM | OXYGEN SATURATION: 100 % | DIASTOLIC BLOOD PRESSURE: 60 MMHG | WEIGHT: 240 LBS

## 2021-08-24 DIAGNOSIS — M47.817 SPONDYLOSIS OF LUMBOSACRAL REGION WITHOUT MYELOPATHY OR RADICULOPATHY: Primary | ICD-10-CM

## 2021-08-24 DIAGNOSIS — M51.36 DDD (DEGENERATIVE DISC DISEASE), LUMBAR: ICD-10-CM

## 2021-08-24 DIAGNOSIS — M47.9 OSTEOARTHRITIS OF SPINE, UNSPECIFIED SPINAL OSTEOARTHRITIS COMPLICATION STATUS, UNSPECIFIED SPINAL REGION: ICD-10-CM

## 2021-08-24 LAB — POCT GLUCOSE: 108 MG/DL (ref 70–110)

## 2021-08-24 PROCEDURE — 64635 DESTROY LUMB/SAC FACET JNT: CPT | Mod: RT,,, | Performed by: ANESTHESIOLOGY

## 2021-08-24 PROCEDURE — 64635 DESTROY LUMB/SAC FACET JNT: CPT | Mod: RT | Performed by: ANESTHESIOLOGY

## 2021-08-24 PROCEDURE — 64636 PR DESTROY L/S FACET JNT ADDL: ICD-10-PCS | Mod: RT,,, | Performed by: ANESTHESIOLOGY

## 2021-08-24 PROCEDURE — 64636 DESTROY L/S FACET JNT ADDL: CPT | Mod: RT,,, | Performed by: ANESTHESIOLOGY

## 2021-08-24 PROCEDURE — 64635 PR DESTROY LUMB/SAC FACET JNT: ICD-10-PCS | Mod: RT,,, | Performed by: ANESTHESIOLOGY

## 2021-08-24 PROCEDURE — 25000003 PHARM REV CODE 250: Performed by: ANESTHESIOLOGY

## 2021-08-24 PROCEDURE — 82947 ASSAY GLUCOSE BLOOD QUANT: CPT | Performed by: ANESTHESIOLOGY

## 2021-08-24 PROCEDURE — 64636 DESTROY L/S FACET JNT ADDL: CPT | Mod: RT | Performed by: ANESTHESIOLOGY

## 2021-08-24 PROCEDURE — 63600175 PHARM REV CODE 636 W HCPCS: Performed by: ANESTHESIOLOGY

## 2021-08-24 RX ORDER — LIDOCAINE HYDROCHLORIDE 10 MG/ML
INJECTION INFILTRATION; PERINEURAL
Status: DISCONTINUED | OUTPATIENT
Start: 2021-08-24 | End: 2021-08-24 | Stop reason: HOSPADM

## 2021-08-24 RX ORDER — FENTANYL CITRATE 50 UG/ML
INJECTION, SOLUTION INTRAMUSCULAR; INTRAVENOUS
Status: DISCONTINUED | OUTPATIENT
Start: 2021-08-24 | End: 2021-08-24 | Stop reason: HOSPADM

## 2021-08-24 RX ORDER — MIDAZOLAM HYDROCHLORIDE 1 MG/ML
INJECTION INTRAMUSCULAR; INTRAVENOUS
Status: DISCONTINUED | OUTPATIENT
Start: 2021-08-24 | End: 2021-08-24 | Stop reason: HOSPADM

## 2021-08-24 RX ORDER — BUPIVACAINE HYDROCHLORIDE 2.5 MG/ML
INJECTION, SOLUTION EPIDURAL; INFILTRATION; INTRACAUDAL
Status: DISCONTINUED | OUTPATIENT
Start: 2021-08-24 | End: 2021-08-24 | Stop reason: HOSPADM

## 2021-09-10 ENCOUNTER — PATIENT MESSAGE (OUTPATIENT)
Dept: PAIN MEDICINE | Facility: OTHER | Age: 56
End: 2021-09-10

## 2021-09-11 ENCOUNTER — HOSPITAL ENCOUNTER (OUTPATIENT)
Facility: OTHER | Age: 56
Discharge: HOME | End: 2021-09-11
Attending: ANESTHESIOLOGY | Admitting: ANESTHESIOLOGY
Payer: MEDICARE

## 2021-09-11 VITALS
OXYGEN SATURATION: 100 % | HEIGHT: 65 IN | DIASTOLIC BLOOD PRESSURE: 65 MMHG | WEIGHT: 240 LBS | SYSTOLIC BLOOD PRESSURE: 117 MMHG | BODY MASS INDEX: 39.99 KG/M2 | TEMPERATURE: 98 F | HEART RATE: 53 BPM | RESPIRATION RATE: 16 BRPM

## 2021-09-11 DIAGNOSIS — Z96.653 STATUS POST TOTAL BILATERAL KNEE REPLACEMENT: ICD-10-CM

## 2021-09-11 DIAGNOSIS — M47.816 LUMBAR SPONDYLOSIS: Primary | ICD-10-CM

## 2021-09-11 DIAGNOSIS — M51.36 DDD (DEGENERATIVE DISC DISEASE), LUMBAR: ICD-10-CM

## 2021-09-11 DIAGNOSIS — G89.29 CHRONIC PAIN OF BOTH KNEES: ICD-10-CM

## 2021-09-11 DIAGNOSIS — M25.561 CHRONIC PAIN OF BOTH KNEES: ICD-10-CM

## 2021-09-11 DIAGNOSIS — M47.816 LUMBAR SPONDYLOSIS: ICD-10-CM

## 2021-09-11 DIAGNOSIS — M53.3 SACROILIAC JOINT PAIN: ICD-10-CM

## 2021-09-11 DIAGNOSIS — M25.562 CHRONIC PAIN OF BOTH KNEES: ICD-10-CM

## 2021-09-11 DIAGNOSIS — G89.4 CHRONIC PAIN SYNDROME: ICD-10-CM

## 2021-09-11 LAB — POCT GLUCOSE: 94 MG/DL (ref 70–110)

## 2021-09-11 PROCEDURE — 64636 DESTROY L/S FACET JNT ADDL: CPT | Mod: LT | Performed by: ANESTHESIOLOGY

## 2021-09-11 PROCEDURE — 64635 DESTROY LUMB/SAC FACET JNT: CPT | Mod: LT,,, | Performed by: ANESTHESIOLOGY

## 2021-09-11 PROCEDURE — 64635 PR DESTROY LUMB/SAC FACET JNT: ICD-10-PCS | Mod: LT,,, | Performed by: ANESTHESIOLOGY

## 2021-09-11 PROCEDURE — 64636 DESTROY L/S FACET JNT ADDL: CPT | Mod: LT,,, | Performed by: ANESTHESIOLOGY

## 2021-09-11 PROCEDURE — 64636 PR DESTROY L/S FACET JNT ADDL: ICD-10-PCS | Mod: LT,,, | Performed by: ANESTHESIOLOGY

## 2021-09-11 PROCEDURE — 25000003 PHARM REV CODE 250: Performed by: ANESTHESIOLOGY

## 2021-09-11 PROCEDURE — 63600175 PHARM REV CODE 636 W HCPCS: Performed by: ANESTHESIOLOGY

## 2021-09-11 PROCEDURE — 64635 DESTROY LUMB/SAC FACET JNT: CPT | Mod: LT | Performed by: ANESTHESIOLOGY

## 2021-09-11 RX ORDER — SODIUM CHLORIDE 9 MG/ML
INJECTION, SOLUTION INTRAVENOUS CONTINUOUS
Status: DISCONTINUED | OUTPATIENT
Start: 2021-09-11 | End: 2021-09-11 | Stop reason: HOSPADM

## 2021-09-11 RX ORDER — MIDAZOLAM HYDROCHLORIDE 1 MG/ML
INJECTION INTRAMUSCULAR; INTRAVENOUS
Status: DISCONTINUED | OUTPATIENT
Start: 2021-09-11 | End: 2021-09-11 | Stop reason: HOSPADM

## 2021-09-11 RX ORDER — BUPIVACAINE HYDROCHLORIDE 2.5 MG/ML
INJECTION, SOLUTION EPIDURAL; INFILTRATION; INTRACAUDAL
Status: DISCONTINUED | OUTPATIENT
Start: 2021-09-11 | End: 2021-09-11 | Stop reason: HOSPADM

## 2021-09-11 RX ORDER — OXYCODONE AND ACETAMINOPHEN 10; 325 MG/1; MG/1
1 TABLET ORAL EVERY 8 HOURS PRN
Qty: 90 TABLET | Refills: 0 | Status: SHIPPED | OUTPATIENT
Start: 2021-09-11 | End: 2021-09-28 | Stop reason: SDUPTHER

## 2021-09-11 RX ORDER — FENTANYL CITRATE 50 UG/ML
INJECTION, SOLUTION INTRAMUSCULAR; INTRAVENOUS
Status: DISCONTINUED | OUTPATIENT
Start: 2021-09-11 | End: 2021-09-11 | Stop reason: HOSPADM

## 2021-09-11 RX ORDER — LIDOCAINE HYDROCHLORIDE 10 MG/ML
INJECTION INFILTRATION; PERINEURAL
Status: DISCONTINUED | OUTPATIENT
Start: 2021-09-11 | End: 2021-09-11 | Stop reason: HOSPADM

## 2021-09-11 RX ADMIN — SODIUM CHLORIDE: 0.9 INJECTION, SOLUTION INTRAVENOUS at 10:09

## 2021-09-20 ENCOUNTER — PES CALL (OUTPATIENT)
Dept: ADMINISTRATIVE | Facility: CLINIC | Age: 56
End: 2021-09-20

## 2021-09-20 ENCOUNTER — OFFICE VISIT (OUTPATIENT)
Dept: PODIATRY | Facility: CLINIC | Age: 56
End: 2021-09-20
Payer: MEDICARE

## 2021-09-20 VITALS
HEART RATE: 56 BPM | HEIGHT: 65 IN | BODY MASS INDEX: 39.99 KG/M2 | DIASTOLIC BLOOD PRESSURE: 74 MMHG | SYSTOLIC BLOOD PRESSURE: 135 MMHG | WEIGHT: 240 LBS

## 2021-09-20 DIAGNOSIS — L84 CORNS AND CALLUS: ICD-10-CM

## 2021-09-20 DIAGNOSIS — E11.49 TYPE II DIABETES MELLITUS WITH NEUROLOGICAL MANIFESTATIONS: ICD-10-CM

## 2021-09-20 DIAGNOSIS — B35.1 DERMATOPHYTOSIS OF NAIL: Primary | ICD-10-CM

## 2021-09-20 PROCEDURE — 11056 ROUTINE FOOT CARE: ICD-10-PCS | Mod: Q9,S$PBB,, | Performed by: PODIATRIST

## 2021-09-20 PROCEDURE — 99499 UNLISTED E&M SERVICE: CPT | Mod: S$PBB,,, | Performed by: PODIATRIST

## 2021-09-20 PROCEDURE — 11721 ROUTINE FOOT CARE: ICD-10-PCS | Mod: 59,Q9,S$PBB, | Performed by: PODIATRIST

## 2021-09-20 PROCEDURE — 99999 PR PBB SHADOW E&M-EST. PATIENT-LVL IV: ICD-10-PCS | Mod: PBBFAC,,, | Performed by: PODIATRIST

## 2021-09-20 PROCEDURE — 11056 PARNG/CUTG B9 HYPRKR LES 2-4: CPT | Mod: Q9,PBBFAC,PN | Performed by: PODIATRIST

## 2021-09-20 PROCEDURE — 11721 DEBRIDE NAIL 6 OR MORE: CPT | Mod: 59,Q9,S$PBB, | Performed by: PODIATRIST

## 2021-09-20 PROCEDURE — 99214 OFFICE O/P EST MOD 30 MIN: CPT | Mod: PBBFAC,PN | Performed by: PODIATRIST

## 2021-09-20 PROCEDURE — 99499 NO LOS: ICD-10-PCS | Mod: S$PBB,,, | Performed by: PODIATRIST

## 2021-09-20 PROCEDURE — 99999 PR PBB SHADOW E&M-EST. PATIENT-LVL IV: CPT | Mod: PBBFAC,,, | Performed by: PODIATRIST

## 2021-09-27 ENCOUNTER — TELEPHONE (OUTPATIENT)
Dept: PAIN MEDICINE | Facility: CLINIC | Age: 56
End: 2021-09-27

## 2021-09-28 ENCOUNTER — OFFICE VISIT (OUTPATIENT)
Dept: PAIN MEDICINE | Facility: CLINIC | Age: 56
End: 2021-09-28
Payer: MEDICARE

## 2021-09-28 DIAGNOSIS — M51.36 DDD (DEGENERATIVE DISC DISEASE), LUMBAR: ICD-10-CM

## 2021-09-28 DIAGNOSIS — M53.3 SACROILIAC JOINT PAIN: ICD-10-CM

## 2021-09-28 DIAGNOSIS — G89.29 CHRONIC PAIN OF BOTH KNEES: ICD-10-CM

## 2021-09-28 DIAGNOSIS — M25.561 CHRONIC PAIN OF BOTH KNEES: ICD-10-CM

## 2021-09-28 DIAGNOSIS — M47.817 SPONDYLOSIS OF LUMBOSACRAL REGION WITHOUT MYELOPATHY OR RADICULOPATHY: Primary | ICD-10-CM

## 2021-09-28 DIAGNOSIS — M25.562 CHRONIC PAIN OF BOTH KNEES: ICD-10-CM

## 2021-09-28 DIAGNOSIS — G89.4 CHRONIC PAIN SYNDROME: ICD-10-CM

## 2021-09-28 DIAGNOSIS — Z96.653 STATUS POST TOTAL BILATERAL KNEE REPLACEMENT: ICD-10-CM

## 2021-09-28 DIAGNOSIS — M47.816 LUMBAR SPONDYLOSIS: ICD-10-CM

## 2021-09-28 PROCEDURE — 99213 OFFICE O/P EST LOW 20 MIN: CPT | Mod: 95,,, | Performed by: NURSE PRACTITIONER

## 2021-09-28 PROCEDURE — 99213 PR OFFICE/OUTPT VISIT, EST, LEVL III, 20-29 MIN: ICD-10-PCS | Mod: 95,,, | Performed by: NURSE PRACTITIONER

## 2021-09-28 RX ORDER — OXYCODONE AND ACETAMINOPHEN 10; 325 MG/1; MG/1
1 TABLET ORAL EVERY 8 HOURS PRN
Qty: 90 TABLET | Refills: 0 | Status: SHIPPED | OUTPATIENT
Start: 2021-10-09 | End: 2021-11-08

## 2021-10-25 DIAGNOSIS — E65 ABDOMINAL PANNUS: Primary | ICD-10-CM

## 2021-11-02 DIAGNOSIS — M10.9 GOUT, UNSPECIFIED CAUSE, UNSPECIFIED CHRONICITY, UNSPECIFIED SITE: ICD-10-CM

## 2021-11-02 RX ORDER — COLCHICINE 0.6 MG/1
0.6 CAPSULE ORAL DAILY PRN
Qty: 60 CAPSULE | Refills: 3 | Status: SHIPPED | OUTPATIENT
Start: 2021-11-02 | End: 2021-12-06 | Stop reason: SDUPTHER

## 2021-11-02 RX ORDER — ALLOPURINOL 300 MG/1
300 TABLET ORAL 2 TIMES DAILY
Qty: 180 TABLET | Refills: 3 | Status: SHIPPED | OUTPATIENT
Start: 2021-11-02 | End: 2021-12-06 | Stop reason: SDUPTHER

## 2021-11-08 ENCOUNTER — LAB VISIT (OUTPATIENT)
Dept: LAB | Facility: HOSPITAL | Age: 56
End: 2021-11-08
Attending: INTERNAL MEDICINE
Payer: MEDICARE

## 2021-11-08 ENCOUNTER — IMMUNIZATION (OUTPATIENT)
Dept: PRIMARY CARE CLINIC | Facility: CLINIC | Age: 56
End: 2021-11-08
Payer: MEDICARE

## 2021-11-08 ENCOUNTER — OFFICE VISIT (OUTPATIENT)
Dept: INTERNAL MEDICINE | Facility: CLINIC | Age: 56
End: 2021-11-08
Payer: MEDICARE

## 2021-11-08 VITALS
HEIGHT: 66 IN | SYSTOLIC BLOOD PRESSURE: 130 MMHG | WEIGHT: 244 LBS | DIASTOLIC BLOOD PRESSURE: 70 MMHG | HEART RATE: 70 BPM | BODY MASS INDEX: 39.21 KG/M2 | OXYGEN SATURATION: 99 %

## 2021-11-08 DIAGNOSIS — E53.1 PYRIDOXINE DEFICIENCY: ICD-10-CM

## 2021-11-08 DIAGNOSIS — K21.9 GASTROESOPHAGEAL REFLUX DISEASE WITHOUT ESOPHAGITIS: ICD-10-CM

## 2021-11-08 DIAGNOSIS — M10.9 GOUT, UNSPECIFIED CAUSE, UNSPECIFIED CHRONICITY, UNSPECIFIED SITE: ICD-10-CM

## 2021-11-08 DIAGNOSIS — D64.9 ANEMIA, UNSPECIFIED TYPE: ICD-10-CM

## 2021-11-08 DIAGNOSIS — D51.3 OTHER DIETARY VITAMIN B12 DEFICIENCY ANEMIA: ICD-10-CM

## 2021-11-08 DIAGNOSIS — M79.671 RIGHT FOOT PAIN: ICD-10-CM

## 2021-11-08 DIAGNOSIS — E78.2 MIXED HYPERLIPIDEMIA: Primary | ICD-10-CM

## 2021-11-08 DIAGNOSIS — Z23 NEED FOR VACCINATION: Primary | ICD-10-CM

## 2021-11-08 DIAGNOSIS — M77.8 ENTHESOPATHY OF FOOT: ICD-10-CM

## 2021-11-08 DIAGNOSIS — J45.20 ASTHMA, WELL CONTROLLED, MILD INTERMITTENT: ICD-10-CM

## 2021-11-08 DIAGNOSIS — R73.9 HYPERGLYCEMIA: ICD-10-CM

## 2021-11-08 DIAGNOSIS — R94.6 ABNORMAL RESULTS OF THYROID FUNCTION STUDIES: ICD-10-CM

## 2021-11-08 DIAGNOSIS — E55.9 MILD VITAMIN D DEFICIENCY: ICD-10-CM

## 2021-11-08 DIAGNOSIS — E78.2 MIXED HYPERLIPIDEMIA: ICD-10-CM

## 2021-11-08 LAB
25(OH)D3+25(OH)D2 SERPL-MCNC: 55 NG/ML (ref 30–96)
ALBUMIN SERPL BCP-MCNC: 4 G/DL (ref 3.5–5.2)
ALP SERPL-CCNC: 61 U/L (ref 55–135)
ALT SERPL W/O P-5'-P-CCNC: 19 U/L (ref 10–44)
ANION GAP SERPL CALC-SCNC: 8 MMOL/L (ref 8–16)
AST SERPL-CCNC: 21 U/L (ref 10–40)
BASOPHILS # BLD AUTO: 0.02 K/UL (ref 0–0.2)
BASOPHILS NFR BLD: 0.5 % (ref 0–1.9)
BILIRUB SERPL-MCNC: 0.8 MG/DL (ref 0.1–1)
BUN SERPL-MCNC: 15 MG/DL (ref 6–20)
CALCIUM SERPL-MCNC: 9.9 MG/DL (ref 8.7–10.5)
CHLORIDE SERPL-SCNC: 106 MMOL/L (ref 95–110)
CHOLEST SERPL-MCNC: 145 MG/DL (ref 120–199)
CHOLEST/HDLC SERPL: 2.3 {RATIO} (ref 2–5)
CO2 SERPL-SCNC: 27 MMOL/L (ref 23–29)
CREAT SERPL-MCNC: 0.7 MG/DL (ref 0.5–1.4)
DIFFERENTIAL METHOD: ABNORMAL
EOSINOPHIL # BLD AUTO: 0.2 K/UL (ref 0–0.5)
EOSINOPHIL NFR BLD: 3.9 % (ref 0–8)
ERYTHROCYTE [DISTWIDTH] IN BLOOD BY AUTOMATED COUNT: 13.9 % (ref 11.5–14.5)
EST. GFR  (AFRICAN AMERICAN): >60 ML/MIN/1.73 M^2
EST. GFR  (NON AFRICAN AMERICAN): >60 ML/MIN/1.73 M^2
FERRITIN SERPL-MCNC: 56 NG/ML (ref 20–300)
GLUCOSE SERPL-MCNC: 103 MG/DL (ref 70–110)
HCT VFR BLD AUTO: 39.5 % (ref 37–48.5)
HDLC SERPL-MCNC: 62 MG/DL (ref 40–75)
HDLC SERPL: 42.8 % (ref 20–50)
HGB BLD-MCNC: 12.6 G/DL (ref 12–16)
IMM GRANULOCYTES # BLD AUTO: 0.01 K/UL (ref 0–0.04)
IMM GRANULOCYTES NFR BLD AUTO: 0.3 % (ref 0–0.5)
IRON SERPL-MCNC: 112 UG/DL (ref 30–160)
LDLC SERPL CALC-MCNC: 75.2 MG/DL (ref 63–159)
LYMPHOCYTES # BLD AUTO: 1.7 K/UL (ref 1–4.8)
LYMPHOCYTES NFR BLD: 42.9 % (ref 18–48)
MCH RBC QN AUTO: 31.3 PG (ref 27–31)
MCHC RBC AUTO-ENTMCNC: 31.9 G/DL (ref 32–36)
MCV RBC AUTO: 98 FL (ref 82–98)
MONOCYTES # BLD AUTO: 0.4 K/UL (ref 0.3–1)
MONOCYTES NFR BLD: 10.3 % (ref 4–15)
NEUTROPHILS # BLD AUTO: 1.6 K/UL (ref 1.8–7.7)
NEUTROPHILS NFR BLD: 42.1 % (ref 38–73)
NONHDLC SERPL-MCNC: 83 MG/DL
NRBC BLD-RTO: 0 /100 WBC
PLATELET # BLD AUTO: 223 K/UL (ref 150–450)
PMV BLD AUTO: 11.9 FL (ref 9.2–12.9)
POTASSIUM SERPL-SCNC: 4 MMOL/L (ref 3.5–5.1)
PROT SERPL-MCNC: 7.7 G/DL (ref 6–8.4)
RBC # BLD AUTO: 4.03 M/UL (ref 4–5.4)
SATURATED IRON: 31 % (ref 20–50)
SODIUM SERPL-SCNC: 141 MMOL/L (ref 136–145)
TOTAL IRON BINDING CAPACITY: 363 UG/DL (ref 250–450)
TRANSFERRIN SERPL-MCNC: 245 MG/DL (ref 200–375)
TRIGL SERPL-MCNC: 39 MG/DL (ref 30–150)
TSH SERPL DL<=0.005 MIU/L-ACNC: 1.85 UIU/ML (ref 0.4–4)
URATE SERPL-MCNC: 2.4 MG/DL (ref 2.4–5.7)
VIT B12 SERPL-MCNC: >2000 PG/ML (ref 210–950)
WBC # BLD AUTO: 3.89 K/UL (ref 3.9–12.7)

## 2021-11-08 PROCEDURE — 99214 OFFICE O/P EST MOD 30 MIN: CPT | Mod: S$PBB,,, | Performed by: INTERNAL MEDICINE

## 2021-11-08 PROCEDURE — 90686 IIV4 VACC NO PRSV 0.5 ML IM: CPT | Mod: PBBFAC

## 2021-11-08 PROCEDURE — 83921 ORGANIC ACID SINGLE QUANT: CPT | Performed by: INTERNAL MEDICINE

## 2021-11-08 PROCEDURE — 99999 PR PBB SHADOW E&M-EST. PATIENT-LVL IV: CPT | Mod: PBBFAC,,, | Performed by: INTERNAL MEDICINE

## 2021-11-08 PROCEDURE — 99214 OFFICE O/P EST MOD 30 MIN: CPT | Mod: PBBFAC,25 | Performed by: INTERNAL MEDICINE

## 2021-11-08 PROCEDURE — 84466 ASSAY OF TRANSFERRIN: CPT | Performed by: INTERNAL MEDICINE

## 2021-11-08 PROCEDURE — 84425 ASSAY OF VITAMIN B-1: CPT | Performed by: INTERNAL MEDICINE

## 2021-11-08 PROCEDURE — 80053 COMPREHEN METABOLIC PANEL: CPT | Performed by: INTERNAL MEDICINE

## 2021-11-08 PROCEDURE — 99214 PR OFFICE/OUTPT VISIT, EST, LEVL IV, 30-39 MIN: ICD-10-PCS | Mod: S$PBB,,, | Performed by: INTERNAL MEDICINE

## 2021-11-08 PROCEDURE — 93010 ELECTROCARDIOGRAM REPORT: CPT | Mod: S$PBB,,, | Performed by: INTERNAL MEDICINE

## 2021-11-08 PROCEDURE — 84550 ASSAY OF BLOOD/URIC ACID: CPT | Performed by: INTERNAL MEDICINE

## 2021-11-08 PROCEDURE — 82306 VITAMIN D 25 HYDROXY: CPT | Performed by: INTERNAL MEDICINE

## 2021-11-08 PROCEDURE — 84207 ASSAY OF VITAMIN B-6: CPT | Performed by: INTERNAL MEDICINE

## 2021-11-08 PROCEDURE — 82607 VITAMIN B-12: CPT | Performed by: INTERNAL MEDICINE

## 2021-11-08 PROCEDURE — 82728 ASSAY OF FERRITIN: CPT | Performed by: INTERNAL MEDICINE

## 2021-11-08 PROCEDURE — 93010 EKG 12-LEAD: ICD-10-PCS | Mod: S$PBB,,, | Performed by: INTERNAL MEDICINE

## 2021-11-08 PROCEDURE — 80061 LIPID PANEL: CPT | Performed by: INTERNAL MEDICINE

## 2021-11-08 PROCEDURE — 99999 PR PBB SHADOW E&M-EST. PATIENT-LVL IV: ICD-10-PCS | Mod: PBBFAC,,, | Performed by: INTERNAL MEDICINE

## 2021-11-08 PROCEDURE — 36415 COLL VENOUS BLD VENIPUNCTURE: CPT | Performed by: INTERNAL MEDICINE

## 2021-11-08 PROCEDURE — 84443 ASSAY THYROID STIM HORMONE: CPT | Performed by: INTERNAL MEDICINE

## 2021-11-08 PROCEDURE — 93005 ELECTROCARDIOGRAM TRACING: CPT | Mod: PBBFAC | Performed by: INTERNAL MEDICINE

## 2021-11-08 PROCEDURE — 83036 HEMOGLOBIN GLYCOSYLATED A1C: CPT | Performed by: INTERNAL MEDICINE

## 2021-11-08 PROCEDURE — 0004A COVID-19, MRNA, LNP-S, PF, 30 MCG/0.3 ML DOSE VACCINE: CPT | Mod: CV19,PBBFAC | Performed by: INTERNAL MEDICINE

## 2021-11-08 PROCEDURE — 85025 COMPLETE CBC W/AUTO DIFF WBC: CPT | Performed by: INTERNAL MEDICINE

## 2021-11-08 RX ORDER — FLUOXETINE 20 MG/5ML
20 SOLUTION ORAL DAILY
Qty: 450 ML | Refills: 3 | Status: SHIPPED | OUTPATIENT
Start: 2021-11-08 | End: 2022-05-09 | Stop reason: SDUPTHER

## 2021-11-08 RX ORDER — ALBUTEROL SULFATE 90 UG/1
AEROSOL, METERED RESPIRATORY (INHALATION)
Qty: 18 G | Refills: 1 | Status: SHIPPED | OUTPATIENT
Start: 2021-11-08

## 2021-11-08 RX ORDER — ATORVASTATIN CALCIUM 20 MG/1
20 TABLET, FILM COATED ORAL DAILY
Qty: 90 TABLET | Refills: 3 | Status: SHIPPED | OUTPATIENT
Start: 2021-11-08 | End: 2022-11-14

## 2021-11-08 RX ORDER — OMEPRAZOLE 20 MG/1
20 CAPSULE, DELAYED RELEASE ORAL EVERY MORNING
Qty: 90 CAPSULE | Refills: 3 | Status: SHIPPED | OUTPATIENT
Start: 2021-11-08 | End: 2022-11-14

## 2021-11-09 LAB
ESTIMATED AVG GLUCOSE: 134 MG/DL (ref 68–131)
HBA1C MFR BLD: 6.3 % (ref 4–5.6)

## 2021-11-12 LAB
METHYLMALONATE SERPL-SCNC: 0.13 UMOL/L
PYRIDOXAL SERPL-MCNC: 36 UG/L (ref 5–50)
VIT B1 BLD-MCNC: 88 UG/L (ref 38–122)

## 2021-11-15 ENCOUNTER — HOSPITAL ENCOUNTER (OUTPATIENT)
Dept: RADIOLOGY | Facility: HOSPITAL | Age: 56
Discharge: HOME | End: 2021-11-15
Attending: INTERNAL MEDICINE
Payer: MEDICARE

## 2021-11-15 DIAGNOSIS — R73.9 HYPERGLYCEMIA: ICD-10-CM

## 2021-11-15 PROCEDURE — 71046 X-RAY EXAM CHEST 2 VIEWS: CPT | Mod: 26,,, | Performed by: RADIOLOGY

## 2021-11-15 PROCEDURE — 71046 X-RAY EXAM CHEST 2 VIEWS: CPT | Mod: TC,PN

## 2021-11-15 PROCEDURE — 71046 XR CHEST PA AND LATERAL: ICD-10-PCS | Mod: 26,,, | Performed by: RADIOLOGY

## 2021-11-15 NOTE — PLAN OF CARE
Problem: Patient Care Overview  Goal: Plan of Care Review  Pt tolerated procedure well. Pt reports 0/10 pain after procedure. Assisted pt up for first time. Steady on feet. All discharge instructions given.       N/A

## 2021-11-29 ENCOUNTER — OFFICE VISIT (OUTPATIENT)
Dept: PODIATRY | Facility: CLINIC | Age: 56
End: 2021-11-29
Payer: MEDICARE

## 2021-11-29 VITALS
HEIGHT: 66 IN | DIASTOLIC BLOOD PRESSURE: 68 MMHG | WEIGHT: 244 LBS | HEART RATE: 77 BPM | SYSTOLIC BLOOD PRESSURE: 142 MMHG | BODY MASS INDEX: 39.21 KG/M2

## 2021-11-29 DIAGNOSIS — B35.1 DERMATOPHYTOSIS OF NAIL: ICD-10-CM

## 2021-11-29 DIAGNOSIS — M77.8 ENTHESOPATHY OF FOOT: Primary | ICD-10-CM

## 2021-11-29 DIAGNOSIS — E11.49 TYPE II DIABETES MELLITUS WITH NEUROLOGICAL MANIFESTATIONS: ICD-10-CM

## 2021-11-29 DIAGNOSIS — L84 CORNS AND CALLUS: ICD-10-CM

## 2021-11-29 PROCEDURE — 99213 OFFICE O/P EST LOW 20 MIN: CPT | Mod: 25,S$PBB,, | Performed by: PODIATRIST

## 2021-11-29 PROCEDURE — 11721 ROUTINE FOOT CARE: ICD-10-PCS | Mod: 59,Q9,S$PBB, | Performed by: PODIATRIST

## 2021-11-29 PROCEDURE — 99213 PR OFFICE/OUTPT VISIT, EST, LEVL III, 20-29 MIN: ICD-10-PCS | Mod: 25,S$PBB,, | Performed by: PODIATRIST

## 2021-11-29 PROCEDURE — 11055 ROUTINE FOOT CARE: ICD-10-PCS | Mod: Q9,S$PBB,, | Performed by: PODIATRIST

## 2021-11-29 PROCEDURE — 11055 PARING/CUTG B9 HYPRKER LES 1: CPT | Performed by: PODIATRIST

## 2021-11-29 PROCEDURE — 11721 DEBRIDE NAIL 6 OR MORE: CPT | Mod: 59,Q9,S$PBB, | Performed by: PODIATRIST

## 2021-11-29 PROCEDURE — 99214 OFFICE O/P EST MOD 30 MIN: CPT | Mod: PBBFAC,PN | Performed by: PODIATRIST

## 2021-11-29 PROCEDURE — 99999 PR PBB SHADOW E&M-EST. PATIENT-LVL IV: CPT | Mod: PBBFAC,,, | Performed by: PODIATRIST

## 2021-11-29 PROCEDURE — 99999 PR PBB SHADOW E&M-EST. PATIENT-LVL IV: ICD-10-PCS | Mod: PBBFAC,,, | Performed by: PODIATRIST

## 2021-11-29 RX ORDER — LIDOCAINE 50 MG/G
OINTMENT TOPICAL
Qty: 35 G | Refills: 6 | Status: SHIPPED | OUTPATIENT
Start: 2021-11-29 | End: 2021-12-29

## 2021-12-06 ENCOUNTER — OFFICE VISIT (OUTPATIENT)
Dept: RHEUMATOLOGY | Facility: CLINIC | Age: 56
End: 2021-12-06
Payer: MEDICARE

## 2021-12-06 VITALS
DIASTOLIC BLOOD PRESSURE: 83 MMHG | BODY MASS INDEX: 39.76 KG/M2 | TEMPERATURE: 98 F | HEART RATE: 74 BPM | RESPIRATION RATE: 18 BRPM | SYSTOLIC BLOOD PRESSURE: 129 MMHG | OXYGEN SATURATION: 98 % | HEIGHT: 66 IN | WEIGHT: 247.38 LBS

## 2021-12-06 DIAGNOSIS — E66.9 CLASS 2 OBESITY WITH BODY MASS INDEX (BMI) OF 39.0 TO 39.9 IN ADULT, UNSPECIFIED OBESITY TYPE, UNSPECIFIED WHETHER SERIOUS COMORBIDITY PRESENT: ICD-10-CM

## 2021-12-06 DIAGNOSIS — Z79.899 ENCOUNTER FOR LONG-TERM (CURRENT) USE OF OTHER MEDICATIONS: ICD-10-CM

## 2021-12-06 DIAGNOSIS — M15.9 PRIMARY OSTEOARTHRITIS INVOLVING MULTIPLE JOINTS: ICD-10-CM

## 2021-12-06 DIAGNOSIS — M10.9 GOUT, UNSPECIFIED CAUSE, UNSPECIFIED CHRONICITY, UNSPECIFIED SITE: Primary | ICD-10-CM

## 2021-12-06 DIAGNOSIS — Z71.89 COUNSELING AND COORDINATION OF CARE: ICD-10-CM

## 2021-12-06 PROCEDURE — 99214 PR OFFICE/OUTPT VISIT, EST, LEVL IV, 30-39 MIN: ICD-10-PCS | Mod: S$PBB,,, | Performed by: INTERNAL MEDICINE

## 2021-12-06 PROCEDURE — 99214 OFFICE O/P EST MOD 30 MIN: CPT | Mod: S$PBB,,, | Performed by: INTERNAL MEDICINE

## 2021-12-06 PROCEDURE — 99213 OFFICE O/P EST LOW 20 MIN: CPT | Mod: PBBFAC,PN | Performed by: INTERNAL MEDICINE

## 2021-12-06 PROCEDURE — 99999 PR PBB SHADOW E&M-EST. PATIENT-LVL III: ICD-10-PCS | Mod: PBBFAC,,, | Performed by: INTERNAL MEDICINE

## 2021-12-06 PROCEDURE — 99999 PR PBB SHADOW E&M-EST. PATIENT-LVL III: CPT | Mod: PBBFAC,,, | Performed by: INTERNAL MEDICINE

## 2021-12-06 RX ORDER — COLCHICINE 0.6 MG/1
0.6 CAPSULE ORAL DAILY PRN
Qty: 60 CAPSULE | Refills: 3 | Status: SHIPPED | OUTPATIENT
Start: 2021-12-06 | End: 2022-05-09 | Stop reason: SDUPTHER

## 2021-12-06 RX ORDER — ALLOPURINOL 300 MG/1
300 TABLET ORAL 2 TIMES DAILY
Qty: 180 TABLET | Refills: 3 | Status: SHIPPED | OUTPATIENT
Start: 2021-12-06 | End: 2022-11-09 | Stop reason: SDUPTHER

## 2021-12-08 ENCOUNTER — TELEPHONE (OUTPATIENT)
Dept: PREADMISSION TESTING | Facility: HOSPITAL | Age: 56
End: 2021-12-08
Payer: MEDICARE

## 2021-12-09 ENCOUNTER — PATIENT MESSAGE (OUTPATIENT)
Dept: PLASTIC SURGERY | Facility: CLINIC | Age: 56
End: 2021-12-09
Payer: MEDICARE

## 2021-12-09 DIAGNOSIS — G89.4 CHRONIC PAIN SYNDROME: ICD-10-CM

## 2021-12-10 RX ORDER — OXYCODONE AND ACETAMINOPHEN 10; 325 MG/1; MG/1
1 TABLET ORAL EVERY 8 HOURS PRN
Qty: 90 TABLET | Refills: 0 | Status: SHIPPED | OUTPATIENT
Start: 2021-12-10 | End: 2021-12-28 | Stop reason: SDUPTHER

## 2021-12-13 ENCOUNTER — HOSPITAL ENCOUNTER (OUTPATIENT)
Dept: PREADMISSION TESTING | Facility: HOSPITAL | Age: 56
Discharge: HOME | End: 2021-12-13
Attending: SURGERY
Payer: MEDICARE

## 2021-12-13 VITALS
DIASTOLIC BLOOD PRESSURE: 58 MMHG | SYSTOLIC BLOOD PRESSURE: 110 MMHG | BODY MASS INDEX: 40.68 KG/M2 | TEMPERATURE: 99 F | OXYGEN SATURATION: 96 % | HEART RATE: 86 BPM | WEIGHT: 244.13 LBS | HEIGHT: 65 IN

## 2021-12-14 ENCOUNTER — TELEPHONE (OUTPATIENT)
Dept: PLASTIC SURGERY | Facility: CLINIC | Age: 56
End: 2021-12-14
Payer: MEDICARE

## 2021-12-27 ENCOUNTER — TELEPHONE (OUTPATIENT)
Dept: PAIN MEDICINE | Facility: CLINIC | Age: 56
End: 2021-12-27
Payer: MEDICARE

## 2021-12-28 ENCOUNTER — OFFICE VISIT (OUTPATIENT)
Dept: PAIN MEDICINE | Facility: CLINIC | Age: 56
End: 2021-12-28
Payer: MEDICARE

## 2021-12-28 DIAGNOSIS — G89.4 CHRONIC PAIN SYNDROME: Primary | ICD-10-CM

## 2021-12-28 DIAGNOSIS — M25.562 CHRONIC PAIN OF BOTH KNEES: ICD-10-CM

## 2021-12-28 DIAGNOSIS — Z96.653 STATUS POST TOTAL BILATERAL KNEE REPLACEMENT: ICD-10-CM

## 2021-12-28 DIAGNOSIS — M47.816 LUMBAR SPONDYLOSIS: ICD-10-CM

## 2021-12-28 DIAGNOSIS — G89.29 CHRONIC PAIN OF BOTH KNEES: ICD-10-CM

## 2021-12-28 DIAGNOSIS — G89.4 CHRONIC PAIN SYNDROME: ICD-10-CM

## 2021-12-28 DIAGNOSIS — M53.3 SACROILIAC JOINT PAIN: ICD-10-CM

## 2021-12-28 DIAGNOSIS — M47.817 SPONDYLOSIS OF LUMBOSACRAL REGION WITHOUT MYELOPATHY OR RADICULOPATHY: ICD-10-CM

## 2021-12-28 DIAGNOSIS — M51.36 DDD (DEGENERATIVE DISC DISEASE), LUMBAR: ICD-10-CM

## 2021-12-28 DIAGNOSIS — M25.561 CHRONIC PAIN OF BOTH KNEES: ICD-10-CM

## 2021-12-28 PROCEDURE — 99213 OFFICE O/P EST LOW 20 MIN: CPT | Mod: 95,,, | Performed by: NURSE PRACTITIONER

## 2021-12-28 PROCEDURE — 99213 PR OFFICE/OUTPT VISIT, EST, LEVL III, 20-29 MIN: ICD-10-PCS | Mod: 95,,, | Performed by: NURSE PRACTITIONER

## 2021-12-28 RX ORDER — OXYCODONE AND ACETAMINOPHEN 10; 325 MG/1; MG/1
1 TABLET ORAL EVERY 8 HOURS PRN
Qty: 90 TABLET | Refills: 0 | Status: SHIPPED | OUTPATIENT
Start: 2022-01-09 | End: 2022-02-08

## 2022-01-03 ENCOUNTER — PATIENT MESSAGE (OUTPATIENT)
Dept: PLASTIC SURGERY | Facility: CLINIC | Age: 57
End: 2022-01-03
Payer: MEDICARE

## 2022-01-03 ENCOUNTER — LAB VISIT (OUTPATIENT)
Dept: PRIMARY CARE CLINIC | Facility: CLINIC | Age: 57
End: 2022-01-03
Payer: MEDICARE

## 2022-01-03 DIAGNOSIS — E65 ABDOMINAL PANNUS: Primary | ICD-10-CM

## 2022-01-03 DIAGNOSIS — E65 PANNUS, ABDOMINAL: ICD-10-CM

## 2022-01-03 DIAGNOSIS — E65 PANNUS, ABDOMINAL: Primary | ICD-10-CM

## 2022-01-03 PROCEDURE — U0005 INFEC AGEN DETEC AMPLI PROBE: HCPCS | Performed by: SURGERY

## 2022-01-03 PROCEDURE — U0003 INFECTIOUS AGENT DETECTION BY NUCLEIC ACID (DNA OR RNA); SEVERE ACUTE RESPIRATORY SYNDROME CORONAVIRUS 2 (SARS-COV-2) (CORONAVIRUS DISEASE [COVID-19]), AMPLIFIED PROBE TECHNIQUE, MAKING USE OF HIGH THROUGHPUT TECHNOLOGIES AS DESCRIBED BY CMS-2020-01-R: HCPCS | Performed by: SURGERY

## 2022-01-05 LAB
SARS-COV-2 RNA RESP QL NAA+PROBE: NOT DETECTED
SARS-COV-2- CYCLE NUMBER: NORMAL

## 2022-02-07 ENCOUNTER — OFFICE VISIT (OUTPATIENT)
Dept: PODIATRY | Facility: CLINIC | Age: 57
End: 2022-02-07
Payer: MEDICARE

## 2022-02-07 VITALS
SYSTOLIC BLOOD PRESSURE: 137 MMHG | BODY MASS INDEX: 40.65 KG/M2 | DIASTOLIC BLOOD PRESSURE: 79 MMHG | HEART RATE: 62 BPM | WEIGHT: 244 LBS | HEIGHT: 65 IN

## 2022-02-07 DIAGNOSIS — E11.49 TYPE II DIABETES MELLITUS WITH NEUROLOGICAL MANIFESTATIONS: Primary | ICD-10-CM

## 2022-02-07 DIAGNOSIS — L85.3 DRY SKIN: ICD-10-CM

## 2022-02-07 DIAGNOSIS — L84 CORNS AND CALLUS: ICD-10-CM

## 2022-02-07 DIAGNOSIS — B35.1 DERMATOPHYTOSIS OF NAIL: ICD-10-CM

## 2022-02-07 PROCEDURE — 11721 ROUTINE FOOT CARE: ICD-10-PCS | Mod: 59,Q9,S$PBB, | Performed by: PODIATRIST

## 2022-02-07 PROCEDURE — 99999 PR PBB SHADOW E&M-EST. PATIENT-LVL IV: CPT | Mod: PBBFAC,,, | Performed by: PODIATRIST

## 2022-02-07 PROCEDURE — 99999 PR PBB SHADOW E&M-EST. PATIENT-LVL IV: ICD-10-PCS | Mod: PBBFAC,,, | Performed by: PODIATRIST

## 2022-02-07 PROCEDURE — 11056 ROUTINE FOOT CARE: ICD-10-PCS | Mod: Q9,S$PBB,, | Performed by: PODIATRIST

## 2022-02-07 PROCEDURE — 11721 DEBRIDE NAIL 6 OR MORE: CPT | Performed by: PODIATRIST

## 2022-02-07 PROCEDURE — 99499 UNLISTED E&M SERVICE: CPT | Mod: S$PBB,,, | Performed by: PODIATRIST

## 2022-02-07 PROCEDURE — 99214 OFFICE O/P EST MOD 30 MIN: CPT | Mod: PBBFAC,PN | Performed by: PODIATRIST

## 2022-02-07 PROCEDURE — 99499 NO LOS: ICD-10-PCS | Mod: S$PBB,,, | Performed by: PODIATRIST

## 2022-02-07 PROCEDURE — 11056 PARNG/CUTG B9 HYPRKR LES 2-4: CPT | Mod: Q9,S$PBB,, | Performed by: PODIATRIST

## 2022-02-07 RX ORDER — LIDOCAINE 50 MG/G
OINTMENT TOPICAL
COMMUNITY
Start: 2022-01-11 | End: 2022-07-08 | Stop reason: SDUPTHER

## 2022-02-07 NOTE — PROGRESS NOTES
Chief Complaint   Patient presents with    Follow-up     Foot Exam           HPI:   The patient is a 56 y.o.  female  who presents for a diabetic foot exam/care   Patient reports + presence of abnormal sensation to the feet, just sometimes, mostly at night.   Patient has no history of wounds on the feet.   Hx of foot surgery: none.     Shoe gear: casual shoes  This patient has documented high risk feet requiring routine maintenance secondary to diabetes.     Main concern today is need for toenail trimming. Routine trimming helps with toe pain.  She is usually voltaren gel/lidocaine topical compound for heel pain.         Primary care doctor is: Clarisse Richardson MD  Patient last saw primary care doctor on:   11/8/2021        Patient Active Problem List   Diagnosis    Morbid obesity    PADDY on CPAP    Type 2 diabetes mellitus, uncontrolled    Nuclear sclerosis - Both Eyes    Hyperlipemia    Proteinuria    Bilateral knee pain    DJD (degenerative joint disease) of knee    Debility    Prosthetic joint implant failure    Failed total knee arthroplasty    Right knee pain    Knee pain    S/P total knee replacement    Lumbago    CTS (carpal tunnel syndrome)    Right carpal tunnel syndrome    Chronic, continuous use of opioids    Mild intermittent asthma without complication    Left arm pain    Left shoulder pain    Allergic reaction to food    DDD (degenerative disc disease), cervical    Cervical radiculopathy    Cervical spondylosis    Cubital tunnel syndrome on right    Bilateral shoulder bursitis    Cervical stenosis of spinal canal    DDD (degenerative disc disease), thoracic    Thoracic spondylosis    Spondylolisthesis of lumbar region    Lumbar spondylosis    Spondylolisthesis of cervical region    Cervical spine instability    Myeloradiculopathy    Neural foraminal stenosis of cervical spine    DDD (degenerative disc disease), lumbar    Idiopathic scoliosis of thoracolumbar  spine    Bilateral shoulder region arthritis    Impingement syndrome of right shoulder    Trochanteric bursitis of both hips    Chronic pain    Depression    Recurrent major depressive disorder, in partial remission    GERD (gastroesophageal reflux disease)    Trigger finger    Dyspnea    BMI 45.0-49.9, adult    Asthma    Sacroiliac joint pain    Spondylosis of lumbosacral region without myelopathy or radiculopathy    Subacromial bursitis of left shoulder joint    Sacroiliitis    Snoring           Current Outpatient Medications on File Prior to Visit   Medication Sig Dispense Refill    albuterol (VENTOLIN HFA) 90 mcg/actuation inhaler INHALE TWO PUFFS INTO LUNGS EVERY 4 TO 6 HOURS AS NEEDED FOR SHORTNESS OF BREATH AND FOR WHEEZING 18 g 1    allopurinoL (ZYLOPRIM) 300 MG tablet Take 1 tablet (300 mg total) by mouth 2 (two) times daily. 180 tablet 3    atorvastatin (LIPITOR) 20 MG tablet Take 1 tablet (20 mg total) by mouth once daily. 90 tablet 3    b complex vitamins tablet Take 1 tablet by mouth every other day.       calcium citrate/vitamin D2 (ISIAH-CITRATE ORAL) Take 500 mg by mouth 2 (two) times daily.      colchicine (MITIGARE) 0.6 mg Cap Take 1 capsule (0.6 mg total) by mouth daily as needed (flare up). 60 capsule 3    cyanocobalamin (VITAMIN B-12) 1000 MCG tablet Take 100 mcg by mouth every morning.      diclofenac sodium (VOLTAREN) 1 % Gel Apply 2 g topically 4 (four) times daily as needed. To painful area on the feet. 500 g 5    FLUoxetine (PROZAC) 20 mg/5 mL (4 mg/mL) solution Take 5 mLs (20 mg total) by mouth once daily. 450 mL 3    fluticasone (FLONASE) 50 mcg/actuation nasal spray 1 spray by Each Nare route 2 (two) times daily as needed for Rhinitis. 15 g 0    LIDOcaine (XYLOCAINE) 5 % Oint ointment Apply topically as needed.      MULTIVIT-IRON-MIN-FOLIC ACID 3,500-18-0.4 UNIT-MG-MG ORAL CHEW Take 1 tablet by mouth 2 (two) times daily.       nystatin (MYCOSTATIN) powder Apply  topically 2 (two) times daily. 60 g 3    omeprazole (PRILOSEC) 20 MG capsule Take 1 capsule (20 mg total) by mouth every morning. 90 capsule 3    oxyCODONE-acetaminophen (PERCOCET)  mg per tablet Take 1 tablet by mouth every 8 (eight) hours as needed for Pain. 90 tablet 0    vitamin D 185 MG Tab Take 5,000 mg by mouth every morning.       oxybutynin (DITROPAN-XL) 5 MG TR24 Take 1 tablet (5 mg total) by mouth once daily. 30 tablet 11     Current Facility-Administered Medications on File Prior to Visit   Medication Dose Route Frequency Provider Last Rate Last Admin    0.9%  NaCl infusion   Intravenous Continuous Lina Wagner MD 25 mL/hr at 12/15/20 1506 25 mL/hr at 12/15/20 1506       Review of patient's allergies indicates:  No Known Allergies                  ROS:  General ROS: negative  Respiratory ROS: no cough, shortness of breath, or wheezing  Cardiovascular ROS: no chest pain or dyspnea on exertion  Musculoskeletal ROS: negative  Neurological ROS:   negative for - gait disturbance, + numbness/tingling, seizures or tremors  Dermatological ROS: + nail changes, dry skin          LAST HbA1c:   Hemoglobin A1C   Date Value Ref Range Status   11/08/2021 6.3 (H) 4.0 - 5.6 % Final     Comment:     ADA Screening Guidelines:  5.7-6.4%  Consistent with prediabetes  >or=6.5%  Consistent with diabetes    High levels of fetal hemoglobin interfere with the HbA1C  assay. Heterozygous hemoglobin variants (HbS, HgC, etc)do  not significantly interfere with this assay.   However, presence of multiple variants may affect accuracy.     05/03/2021 6.5 (H) 4.0 - 5.6 % Final     Comment:     ADA Screening Guidelines:  5.7-6.4%  Consistent with prediabetes  >or=6.5%  Consistent with diabetes    High levels of fetal hemoglobin interfere with the HbA1C  assay. Heterozygous hemoglobin variants (HbS, HgC, etc)do  not significantly interfere with this assay.   However, presence of multiple variants may affect accuracy.    "  11/05/2020 6.1 (H) 4.0 - 5.6 % Final     Comment:     ADA Screening Guidelines:  5.7-6.4%  Consistent with prediabetes  >or=6.5%  Consistent with diabetes  High levels of fetal hemoglobin interfere with the HbA1C  assay. Heterozygous hemoglobin variants (HbS, HgC, etc)do  not significantly interfere with this assay.   However, presence of multiple variants may affect accuracy.           EXAM:   Vitals:    02/07/22 0930   BP: 137/79   Pulse: 62   Weight: 110.7 kg (244 lb)   Height: 5' 5" (1.651 m)       General: Pt. is well-developed, well-nourished, appears stated age, in no acute distress, alert and oriented x 3.      Vascular: Dorsalis pedis and posterior tibial pulses are 2/4 bilaterally. 3 secs capillary refill time and toes are warm to touch.    There is reduced hair growth on the feet.    There is 1+ edema.  no varicosities.      Neurological:  Light touch, proprioception, and sharp/dull sensation are all intact bilaterally. Protective threshold with the [place] monofilament is diminished bilaterally.   +paresthesias      Dermatological:  The skin is thin    The toenails  1-5 L and 1-5 R  are greenish- yellow, long by 5-6mm and thickened by 2-3mm with subungual debris  .   There are no open wounds. There is no ecchymoses.  no erythema noted.   Skin diffusely dry on the soles     Interdigital spaces are intact, no fissures    Skin atrophic, thin, dry and mildly hyperpigmented.       Musculoskeletal:    Muscle strength is 5/5 in all groups bilaterally.    Metatarsophalangeal, subtalar, and ankle range of motion are intact without crepitus.     Soft,  fluid-filled subcutaneous mass noted on the dorsal lateral aspect of the right foot.  It is not tender to palpation.  There is tenderness to palpation at the styloid process near the insertion of the peroneal tendon.  It is mildly swollen.  Minimal redness.  There is no pain with range of motion.      Assessment / Plan:    Problem List Items Addressed " This Visit    None     Visit Diagnoses     Dermatophytosis of nail    -  Primary    Corns and callus        Type II diabetes mellitus with neurological manifestations        Dry skin            · I counseled the patient on the patient's conditions, their implications and medical management.   Shoe inspection.      Continue good nutrition and blood sugar control to help prevent podiatric complications of diabetes.    Maintain proper foot hygiene.    Continue wearing proper shoe gear, daily foot inspections, never walking without protective shoe gear, never putting sharp instruments to feet.  Shoe and activity modification as needed for relief.         Routine Foot Care    Date/Time: 2/7/2022 9:15 AM  Performed by: Stacey Rowland DPM  Authorized by: Stacey Rowland DPM     Consent Done?:  Yes (Verbal)  Hyperkeratotic Skin Lesions?: Yes    Number of trimmed lesions:  2  Location(s):  Left Heel and Right Heel    Nail Care Type:  Debride  Location(s): All  (Left 1st Toe, Left 3rd Toe, Left 2nd Toe, Left 4th Toe, Left 5th Toe, Right 1st Toe, Right 2nd Toe, Right 3rd Toe, Right 4th Toe and Right 5th Toe)  Patient tolerance:  Patient tolerated the procedure well with no immediate complications     With patient's permission, the toenails mentioned above were reduced and debrided using a nail nipper, removing offending nail and debris.   Utilizing a #15 scalpel, I trimmed the corns and calluses at the above mentioned location.      The patient will continue to monitor the areas daily, inspect the feet, wear protective shoe gear when ambulatory, and moisturizer to maintain skin integrity.

## 2022-02-10 RX ORDER — OXYCODONE AND ACETAMINOPHEN 10; 325 MG/1; MG/1
1 TABLET ORAL EVERY 8 HOURS PRN
COMMUNITY
End: 2022-02-10 | Stop reason: SDUPTHER

## 2022-02-10 RX ORDER — OXYCODONE AND ACETAMINOPHEN 10; 325 MG/1; MG/1
1 TABLET ORAL EVERY 8 HOURS PRN
Qty: 90 TABLET | Refills: 0 | Status: SHIPPED | OUTPATIENT
Start: 2022-02-10 | End: 2022-03-11 | Stop reason: SDUPTHER

## 2022-02-10 NOTE — TELEPHONE ENCOUNTER
Patient is requesting RX refill for Percocet 10/325    LOV: 12/28/2021  Last filled per : 01/11/2022  No pain contract on file  UDS: 06/07/2021    CODEINE  Not Detected   Not Detected   Not Detected  Not Detected     MORPHINE  Not Detected   Not Detected   Not Detected  Not Detected     6-ACETYLMORPHINE  Not Detected   Not Detected   Not Detected  Not Detected     OXYCODONE  Present   Not Detected   Not Detected  Present     NOROYXCODONE  Present   Present   Present  Present     OXYMORPHONE  Present   Not Detected   Not Detected  Not Detected     NOROXYMORPHONE  Not Detected   Not Detected   Not Detected  Not Detected     HYDROCODONE  Not Detected   Not Detected   Not Detected  Not Detected     NORHYDROCODONE  Not Detected   Not Detected   Not Detected  Not Detected     HYDROMORPHONE  Not Detected   Not Detected   Not Detected  Not Detected     BUPRENORPHINE  Not Detected   Not Detected   Not Detected  Not Detected     NORUBPRENORPHINE  Not Detected   Not Detected   Not Detected  Not Detected     FENTANYL  Not Detected   Not Detected   Not Detected  Not Detected     NORFENTANYL  Not Detected   Not Detected   Not Detected  Not Detected     MEPERIDINE METABOLITE  Not Detected   Not Detected   Not Detected  Not Detected     TAPENTADOL  Not Detected   Not Detected   Not Detected  Not Detected     TAPENTADOL-O-SULF  Not Detected   Not Detected   Not Detected  Not Detected     METHADONE  Not Detected   Not Detected   Not Detected  Not Detected     TRAMADOL  Not Detected   Not Detected   Not Detected  Not Detected     AMPHETAMINE  Not Detected   Not Detected   Not Detected  Not Detected     METHAMPHETAMINE  Not Detected   Not Detected   Not Detected  Not Detected     MDMA- ECSTASY  Not Detected   Not Detected   Not Detected  Not Detected     MDA  Not Detected   Not Detected   Not Detected  Not Detected     MDEA- Kait  Not Detected   Not Detected   Not Detected  Not Detected     METHYLPHENIDATE  Not Detected   Not  Detected   Not Detected  Not Detected     PHENTERMINE  Not Detected   Not Detected   Not Detected  Not Detected     BENZOYLECGONINE  Not Detected   Not Detected   Not Detected  Not Detected     ALPRAZOLAM  Not Detected   Not Detected   Not Detected  Not Detected     ALPHA-OH-ALPRAZOLAM  Not Detected   Not Detected   Not Detected  Not Detected     CLONAZEPAM  Not Detected   Not Detected   Not Detected  Not Detected     7-AMINOCLONAZEPAM  Not Detected   Not Detected   Not Detected  Not Detected     DIAZEPAM  Not Detected   Not Detected   Not Detected  Not Detected     NORDIAZEPAM  Not Detected   Not Detected   Not Detected  Not Detected     OXAZEPAM  Not Detected   Not Detected   Not Detected  Not Detected     TEMAZEPAM  Not Detected   Not Detected   Not Detected  Not Detected     Lorazepam  Not Detected   Not Detected   Not Detected  Not Detected     MIDAZOLAM  Not Detected   Not Detected   Not Detected  Not Detected     ZOLPIDEM  Not Detected   Not Detected   Not Detected  Not Detected     BARBITURATES  Not Detected   Not Detected   Not Detected  Not Detected     Creatinine, Urine 20.0 - 400.0 mg/dL 93.0  86.0 R, CM  66.1  113.0 R, CM  91.8  163.4  301.0 R, CM    ETHYL GLUCURONIDE  Not Detected   Not Detected   Not Detected  Not Detected     MARIJUANA METABOLITE  Not Detected   Not Detected   Not Detected  Not Detected     PCP  Not Detected   Not Detected   Not Detected  Not Detected     CARISOPRODOL  Not Detected   Not Detected CM   Not Detected CM  Not Detected CM

## 2022-02-10 NOTE — TELEPHONE ENCOUNTER
----- Message from Dianne Draper sent at 2/10/2022  8:57 AM CST -----  Who Called:        Refill or New Rx: refill         RX Name and Strength:   Percocet 10/325        How is the patient currently taking it? (ex. 1XDay)        Is this a 30 day or 90 day RX        Preferred Pharmacy with phone number:  [facility] - [address]        Local or Mail Order: local          Ordering Provider:        Would the patient rather a call back or a response via [facility]? Call back         Best Call Back Number:   [phone]        Additional Information:

## 2022-02-17 ENCOUNTER — TELEPHONE (OUTPATIENT)
Dept: PAIN MEDICINE | Facility: CLINIC | Age: 57
End: 2022-02-17
Payer: MEDICARE

## 2022-02-17 DIAGNOSIS — M47.816 LUMBAR SPONDYLOSIS: ICD-10-CM

## 2022-02-17 DIAGNOSIS — M47.817 SPONDYLOSIS OF LUMBOSACRAL REGION WITHOUT MYELOPATHY OR RADICULOPATHY: Primary | ICD-10-CM

## 2022-02-17 NOTE — TELEPHONE ENCOUNTER
----- Message from Diaz Dobson sent at 2/16/2022  3:47 PM CST -----  Regarding: Appt  Contact: DONTAE MOON [[id number]]  Name of Who is Calling: DONTAE MOON [[id number]]      What is the request in detail: Would like to speak with staff in regards to moving forward with RFA . Please advise      Can the clinic reply by [facility]: no      What Number to Call Back if not in [facility]: [phone]

## 2022-02-28 DIAGNOSIS — E65 PANNUS, ABDOMINAL: Primary | ICD-10-CM

## 2022-03-03 ENCOUNTER — TELEPHONE (OUTPATIENT)
Dept: PODIATRY | Facility: CLINIC | Age: 57
End: 2022-03-03
Payer: MEDICARE

## 2022-03-03 NOTE — TELEPHONE ENCOUNTER
ok        ----- Message from Radha Mueller sent at 3/3/2022 10:22 AM CST -----  Regarding: refill  Can the clinic reply in [facility]: no      Please refill the medication(s) listed below. Please call the patient when the prescription(s) is ready for  at this phone number         Medication #1 indomethacin (INDOCIN) 50 MG capsule    Medication #2       Preferred Pharmacy: [facility] [address]

## 2022-03-04 ENCOUNTER — PATIENT MESSAGE (OUTPATIENT)
Dept: PODIATRY | Facility: CLINIC | Age: 57
End: 2022-03-04
Payer: MEDICARE

## 2022-03-07 ENCOUNTER — TELEPHONE (OUTPATIENT)
Dept: PODIATRY | Facility: CLINIC | Age: 57
End: 2022-03-07
Payer: MEDICARE

## 2022-03-07 ENCOUNTER — HOSPITAL ENCOUNTER (OUTPATIENT)
Facility: OTHER | Age: 57
Discharge: HOME | End: 2022-03-07
Attending: ANESTHESIOLOGY | Admitting: ANESTHESIOLOGY
Payer: MEDICARE

## 2022-03-07 ENCOUNTER — PATIENT MESSAGE (OUTPATIENT)
Dept: BARIATRICS | Facility: CLINIC | Age: 57
End: 2022-03-07
Payer: MEDICARE

## 2022-03-07 VITALS
HEART RATE: 50 BPM | BODY MASS INDEX: 39.99 KG/M2 | DIASTOLIC BLOOD PRESSURE: 80 MMHG | OXYGEN SATURATION: 99 % | RESPIRATION RATE: 16 BRPM | TEMPERATURE: 98 F | SYSTOLIC BLOOD PRESSURE: 129 MMHG | HEIGHT: 65 IN | WEIGHT: 240 LBS

## 2022-03-07 DIAGNOSIS — M77.8 ENTHESOPATHY OF FOOT: ICD-10-CM

## 2022-03-07 DIAGNOSIS — G89.29 CHRONIC PAIN: ICD-10-CM

## 2022-03-07 DIAGNOSIS — M79.671 RIGHT FOOT PAIN: Primary | ICD-10-CM

## 2022-03-07 DIAGNOSIS — M47.816 LUMBAR SPONDYLOSIS: Primary | ICD-10-CM

## 2022-03-07 LAB — POCT GLUCOSE: 89 MG/DL (ref 70–110)

## 2022-03-07 PROCEDURE — 64636 DESTROY L/S FACET JNT ADDL: CPT | Mod: RT,,, | Performed by: ANESTHESIOLOGY

## 2022-03-07 PROCEDURE — 99152 MOD SED SAME PHYS/QHP 5/>YRS: CPT | Performed by: ANESTHESIOLOGY

## 2022-03-07 PROCEDURE — 64635 DESTROY LUMB/SAC FACET JNT: CPT | Mod: RT | Performed by: ANESTHESIOLOGY

## 2022-03-07 PROCEDURE — 64635 DESTROY LUMB/SAC FACET JNT: CPT | Mod: RT,,, | Performed by: ANESTHESIOLOGY

## 2022-03-07 PROCEDURE — 63600175 PHARM REV CODE 636 W HCPCS: Performed by: ANESTHESIOLOGY

## 2022-03-07 PROCEDURE — 64635 PR DESTROY LUMB/SAC FACET JNT: ICD-10-PCS | Mod: RT,,, | Performed by: ANESTHESIOLOGY

## 2022-03-07 PROCEDURE — 64636 PR DESTROY L/S FACET JNT ADDL: ICD-10-PCS | Mod: RT,,, | Performed by: ANESTHESIOLOGY

## 2022-03-07 PROCEDURE — 99152 PR MOD CONSCIOUS SEDATION, SAME PHYS, 5+ YRS, FIRST 15 MIN: ICD-10-PCS | Mod: ,,, | Performed by: ANESTHESIOLOGY

## 2022-03-07 PROCEDURE — 64636 DESTROY L/S FACET JNT ADDL: CPT | Mod: RT | Performed by: ANESTHESIOLOGY

## 2022-03-07 PROCEDURE — 25000003 PHARM REV CODE 250: Performed by: STUDENT IN AN ORGANIZED HEALTH CARE EDUCATION/TRAINING PROGRAM

## 2022-03-07 PROCEDURE — 25000003 PHARM REV CODE 250: Performed by: ANESTHESIOLOGY

## 2022-03-07 PROCEDURE — 99152 MOD SED SAME PHYS/QHP 5/>YRS: CPT | Mod: ,,, | Performed by: ANESTHESIOLOGY

## 2022-03-07 RX ORDER — SODIUM CHLORIDE 9 MG/ML
500 INJECTION, SOLUTION INTRAVENOUS CONTINUOUS
Status: DISCONTINUED | OUTPATIENT
Start: 2022-03-07 | End: 2022-03-07 | Stop reason: HOSPADM

## 2022-03-07 RX ORDER — BUPIVACAINE HYDROCHLORIDE 2.5 MG/ML
INJECTION, SOLUTION EPIDURAL; INFILTRATION; INTRACAUDAL
Status: DISCONTINUED | OUTPATIENT
Start: 2022-03-07 | End: 2022-03-07 | Stop reason: HOSPADM

## 2022-03-07 RX ORDER — DEXAMETHASONE SODIUM PHOSPHATE 10 MG/ML
INJECTION INTRAMUSCULAR; INTRAVENOUS
Status: DISCONTINUED | OUTPATIENT
Start: 2022-03-07 | End: 2022-03-07 | Stop reason: HOSPADM

## 2022-03-07 RX ORDER — MIDAZOLAM HYDROCHLORIDE 1 MG/ML
INJECTION INTRAMUSCULAR; INTRAVENOUS
Status: DISCONTINUED | OUTPATIENT
Start: 2022-03-07 | End: 2022-03-07 | Stop reason: HOSPADM

## 2022-03-07 RX ORDER — LIDOCAINE HYDROCHLORIDE 20 MG/ML
INJECTION, SOLUTION INFILTRATION; PERINEURAL
Status: DISCONTINUED | OUTPATIENT
Start: 2022-03-07 | End: 2022-03-07 | Stop reason: HOSPADM

## 2022-03-07 RX ORDER — FENTANYL CITRATE 50 UG/ML
INJECTION, SOLUTION INTRAMUSCULAR; INTRAVENOUS
Status: DISCONTINUED | OUTPATIENT
Start: 2022-03-07 | End: 2022-03-07 | Stop reason: HOSPADM

## 2022-03-07 RX ORDER — INDOMETHACIN 50 MG/1
50 CAPSULE ORAL 2 TIMES DAILY WITH MEALS
Qty: 30 CAPSULE | Refills: 2 | Status: ON HOLD | OUTPATIENT
Start: 2022-03-07 | End: 2022-05-09

## 2022-03-07 NOTE — OP NOTE
Therapeutic Lumbar Medial Branch Radiofrequency Ablation under Fluoroscopy     The procedure, risks, benefits, and options were discussed with the patient. There are no contraindications to the procedure. The patent expressed understanding and agreed to the procedure. Informed written consent was obtained prior to the start of the procedure and can be found in the patient's chart.        PATIENT NAME: Alivia Thomas   MRN: [id number]     DATE OF PROCEDURE: 03/07/2022     PROCEDURE:  Right L3, L4 and L5 Lumbar Radiofrequency Ablation under Fluoroscopy    PRE-OP DIAGNOSIS: Spondylosis of lumbosacral region without myelopathy or radiculopathy [M47.817]  Lumbar spondylosis [M47.816]    POST-OP DIAGNOSIS: Same    PHYSICIAN: Israel Hurtado MD    ASSISTANTS: Maximus Quan MD     MEDICATIONS INJECTED:  Preservative-free Decadron 10mg with 9cc of Bupivicaine 0.25%    LOCAL ANESTHETIC INJECTED:   Xylocaine 2%    SEDATION:   Versed 2mg and Fentanyl 50mcg                                                                                                                                                                                     Conscious sedation ordered by M.D. Patient re-evaluation prior to administration of conscious sedation. No changes noted in patient's status from initial evaluation. The patient's vital signs were monitored by RN and patient remained hemodynamically stable throughout the procedure.    Event Time In   Sedation Start 0947   Sedation End 1001       ESTIMATED BLOOD LOSS:  None    COMPLICATIONS:  None     INTERVAL HISTORY: Patient has clinical and imaging findings suggestive of recurrent facet mediated pain. Patient has undergone a previous RFA at specified levels with at least 50% relief for at least 6 months. Successful diagnostic medial branch blocks have been completed within the past 2 years.    TECHNIQUE: Time-out was performed to identify the patient and procedure to be performed. With the patient  laying in a prone position, the surgical area was prepped and draped in the usual sterile fashion using ChloraPrep and fenestrated drape. The levels were determined under fluoroscopic guidance. Skin anesthesia was achieved by injecting Lidocaine 2% over the injection sites. A 20 gauge 10mm curved active tip needle was introduced to the anatomic local of the medial branch at each of the above levels using AP, lateral and/or contralateral oblique fluoroscopic imaging. Then sensory and motor testing was performed to confirm that the needle tips were in the correct location. After negative aspiration for blood or CSF was confirmed, 1 mL of the lidocaine 2% listed above was injected slowly at each site. This was followed by thermal lesioning at 80 degrees celsius for 90 seconds. That was followed by slowly injecting 3 mL of the medication mixture listed above at each site. The needles were removed and bleeding was nil. A sterile dressing was applied. No specimens collected. The patient tolerated the procedure well and did not have any procedure related motor deficit at the conclusion of the procedure.    PAIN BEFORE THE PROCEDURE: 10/10    PAIN AFTER THE PROCEDURE: 4/10    The patient was monitored after the procedure in the recovery area. They were given post-procedure and discharge instructions to follow at home. The patient was discharged in a stable condition.      Israel Hurtado MD

## 2022-03-07 NOTE — DISCHARGE SUMMARY
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 [M47.817]  Lumbar spondylosis [M47.816]    Disposition: Home or self care    Patient Instructions:   Current Discharge Medication List      CONTINUE these medications which have NOT CHANGED    Details   albuterol (VENTOLIN HFA) 90 mcg/actuation inhaler INHALE TWO PUFFS INTO LUNGS EVERY 4 TO 6 HOURS AS NEEDED FOR SHORTNESS OF BREATH AND FOR WHEEZING  Qty: 18 g, Refills: 1    Comments: Please consider 90 day supplies to promote better adherence  Associated Diagnoses: Asthma, well controlled, mild intermittent      allopurinoL (ZYLOPRIM) 300 MG tablet Take 1 tablet (300 mg total) by mouth 2 (two) times daily.  Qty: 180 tablet, Refills: 3    Associated Diagnoses: Gout, unspecified cause, unspecified chronicity, unspecified site      atorvastatin (LIPITOR) 20 MG tablet Take 1 tablet (20 mg total) by mouth once daily.  Qty: 90 tablet, Refills: 3      b complex vitamins tablet Take 1 tablet by mouth every other day.       calcium citrate/vitamin D2 (ISIAH-CITRATE ORAL) Take 500 mg by mouth 2 (two) times daily.      colchicine (MITIGARE) 0.6 mg Cap Take 1 capsule (0.6 mg total) by mouth daily as needed (flare up).  Qty: 60 capsule, Refills: 3    Associated Diagnoses: Gout, unspecified cause, unspecified chronicity, unspecified site      cyanocobalamin (VITAMIN B-12) 1000 MCG tablet Take 100 mcg by mouth every morning.      diclofenac sodium (VOLTAREN) 1 % Gel Apply 2 g topically 4 (four) times daily as needed. To painful area on the feet.  Qty: 500 g, Refills: 5    Comments: 5 x 100g tubes  Associated Diagnoses: Right foot pain; Enthesopathy of foot      FLUoxetine (PROZAC)  20 mg/5 mL (4 mg/mL) solution Take 5 mLs (20 mg total) by mouth once daily.  Qty: 450 mL, Refills: 3      fluticasone (FLONASE) 50 mcg/actuation nasal spray 1 spray by Each Nare route 2 (two) times daily as needed for Rhinitis.  Qty: 15 g, Refills: 0      LIDOcaine (XYLOCAINE) 5 % Oint ointment Apply topically as needed.      MULTIVIT-IRON-MIN-FOLIC ACID 3,500-18-0.4 UNIT-MG-MG ORAL CHEW Take 1 tablet by mouth 2 (two) times daily.       nystatin (MYCOSTATIN) powder Apply topically 2 (two) times daily.  Qty: 60 g, Refills: 3      omeprazole (PRILOSEC) 20 MG capsule Take 1 capsule (20 mg total) by mouth every morning.  Qty: 90 capsule, Refills: 3    Associated Diagnoses: Gastroesophageal reflux disease without esophagitis      oxybutynin (DITROPAN-XL) 5 MG TR24 Take 1 tablet (5 mg total) by mouth once daily.  Qty: 30 tablet, Refills: 11    Associated Diagnoses: Mixed incontinence urge and stress (male)(female)      oxyCODONE-acetaminophen (PERCOCET)  mg per tablet Take 1 tablet by mouth every 8 (eight) hours as needed for Pain.  Qty: 90 tablet, Refills: 0    Comments: Quantity prescribed more than 7 day supply? Yes, quantity medically necessary      vitamin D 185 MG Tab Take 5,000 mg by mouth every morning.                  Discharge Diagnosis: Spondylosis of lumbosacral region without myelopathy or radiculopathy [M47.817]  Lumbar spondylosis [M47.816]  Condition on Discharge: Stable with no complications to procedure   Diet on Discharge: Same as before.  Activity: as per instruction sheet.  Discharge to: Home with a responsible adult.  Follow up: 2-4 weeks       Please call my office or pager at [phone] if experienced any weakness or loss of sensation, fever > 101.5, pain uncontrolled with oral medications, persistent nausea/vomiting/or diarrhea, redness or drainage from the incisions, or any other worrisome concerns. If physician on call was not reached or could not communicate with our office for any  reason please go to the nearest emergency department

## 2022-03-07 NOTE — DISCHARGE INSTRUCTIONS
Thank you for allowing us to care for you today. You may receive a survey about the care we provided. Your feedback is valuable and helps us provide excellent care throughout the community.     Home Care Instructions for Pain Management:    1. DIET:   You may resume your normal diet today.   2. BATHING:   You may shower with luke warm water. No tub baths or anything that will soak injection sites under water for the next 24 hours.  3. DRESSING:   You may remove your bandage today.   4. ACTIVITY LEVEL:   You may resume your normal activities 24 hrs after your procedure. Nothing strenuous today.  5. MEDICATIONS:   You may resume your normal medications today. To restart blood thinners, ask your doctor.  6. DRIVING    If you have received any sedatives by mouth today, you may not drive for 12 hours.    If you have received any sedation through your IV, you may not drive for 24 hrs.   7. SPECIAL INSTRUCTIONS:   No heat to the injection site for 24 hrs including, hot bath or shower, heating pad, moist heat, or hot tubs.    Use ice pack to injection site for any pain or discomfort.  Apply ice packs for 20 minute intervals as needed.    IF you have diabetes, be sure to monitor your blood sugar more closely. IF your injection contained steroids your blood sugar levels may become higher than normal.    If you are still having pain upon discharge:  Your pain may improve over the next 48 hours. The anesthetic (numbing medication) works immediately to 48 hours. IF your injection contained a steroid (anti-inflammatory medication), it takes approximately 3 days to start feeling relief and 7-10 days to see your greatest results from the medication. It is possible you may need subsequent injections. This would be discussed at your follow up appointment with pain management or your referring doctor.    Please call the PAIN MANAGEMENT office at [phone] or ON CALL pager at [phone] if you experienced any:   -Weakness or  loss of sensation  -Fever > 101.5  -Pain uncontrolled with oral medications   -Persistent nausea, vomiting, or diarrhea  -Redness or drainage from the injection sites, or any other worrisome concerns.   If physician on call was not reached or could not communicate with our office for any reason please go to the nearest emergency department.     Adult Procedural Sedation Instructions    Recovery After Procedural Sedation (Adult)  You have been given medicine by vein to make you sleep during your surgery. This may have included both a pain medicine and sleeping medicine. Most of the effects have worn off. But you may still have some drowsiness for the next 6 to 8 hours.  Home care  Follow these guidelines when you get home:  For the next 8 hours, you should be watched by a responsible adult. This person should make sure your condition is not getting worse.  Don't drink any alcohol for the next 24 hours.  Don't drive, operate dangerous machinery, or make important business or personal decisions during the next 24 hours.  Note: Your healthcare provider may tell you not to take any medicine by mouth for pain or sleep in the next 4 hours. These medicines may react with the medicines you were given in the hospital. This could cause a much stronger response than usual.  Follow-up care  Follow up with your healthcare provider if you are not alert and back to your usual level of activity within 12 hours.  When to seek medical advice  Call your healthcare provider right away if any of these occur:  Drowsiness gets worse  Weakness or dizziness gets worse  Repeated vomiting  You can't be awakened   Date Last Reviewed: 10/18/2016  © 2431-4458 [employer]. [address] [place] [address], [place] [address]. All rights reserved. This information is not intended as a substitute for professional medical care. Always follow your healthcare professional's instructions.

## 2022-03-07 NOTE — H&P
HPI  Patient presenting for Procedure(s) (LRB):  RADIOFREQUENCY ABLATION RIGHT L3,4,5 1 of 2, needs consent (Right)     Pt is a 56 y.o. female w/hx Spondylosis of lumbosacral region without myelopathy or radiculopathy [M47.817]  Lumbar spondylosis [M47.816] who presents for:    RADIOFREQUENCY ABLATION RIGHT L3,4,5 1 of 2, needs consent:     She reports that they are in their usual state of health.  Pain is 10/10 today.  Denies recent infection or abx.  Denies skin change/rash/infection at/near site of procedure.  Not on anticoagulation.  All questions answered.  BGL 89 today.  No new complaints or concerns.      Patient on Anti-coagulation No    No health changes since previous encounter    Past Medical History:   Diagnosis Date    Allergy     Anemia     Asthma     Bilateral shoulder bursitis     Cervical stenosis of spine     Chronic pain     DDD (degenerative disc disease), cervical     Depression 1/28/2019    Diabetes mellitus type II     DJD (degenerative joint disease) of knee 6/19/2014    Facet arthritis of lumbar region 12/17/2015    Facet syndrome 12/17/2015    GERD (gastroesophageal reflux disease)     Heartburn     Hyperlipidemia     Hypertension     Morbid obesity     Neuromuscular disorder     PADDY (obstructive sleep apnea)     Proteinuria     Right carpal tunnel syndrome     Sacroiliitis 6/13/2018    Sacroiliitis     Sleep apnea      Past Surgical History:   Procedure Laterality Date    CARPAL TUNNEL RELEASE      CARPAL TUNNEL RELEASE  1980s    left    CARPAL TUNNEL RELEASE  2012    right    COLONOSCOPY N/A 6/15/2020    Procedure: COLONOSCOPY;  Surgeon: Jc Koo MD;  Location: [address];  Service: Endoscopy;  Laterality: N/A;  COVID screening on 6/13/20 at [facility]  pt updated on drop off location and no visitor policy-rb    ESOPHAGOGASTRODUODENOSCOPY N/A 3/27/2019    Procedure: EGD (ESOPHAGOGASTRODUODENOSCOPY);  Surgeon: Robbin Bar MD;   Location: [facility] ENDO (2ND FLR);  Service: Endoscopy;  Laterality: N/A;  BMI 61.3/2nd floor case/svn    INJECTION OF JOINT Bilateral 6/9/2020    Procedure: INJECTION, JOINT, BILATERAL SI;  Surgeon: Lina Wagner MD;  Location: [facility] PAIN MGT;  Service: Pain Management;  Laterality: Bilateral;  B/L SI Joint Injection    INJECTION OF JOINT Bilateral 5/11/2021    Procedure: INJECTION, JOINT, SACROILIAC (SI) need consent;  Surgeon: Lina Wagner MD;  Location: [facility] PAIN MGT;  Service: Pain Management;  Laterality: Bilateral;    JOINT REPLACEMENT Bilateral     with 2 revisions on rt    KNEE SURGERY  3/2010    orthroscope    KNEE SURGERY  6-19-14    left TKR    LAPAROSCOPIC SLEEVE GASTRECTOMY N/A 8/28/2019    Procedure: GASTRECTOMY, SLEEVE, LAPAROSCOPIC with intraop EGD;  Surgeon: Heriberto Clements MD;  Location: [facility] OR 2ND FLR;  Service: General;  Laterality: N/A;    RADIOFREQUENCY ABLATION Right 7/9/2019    Procedure: RADIOFREQUENCY ABLATION;  Surgeon: Lina Wagner MD;  Location: [facility] PAIN MGT;  Service: Pain Management;  Laterality: Right;  RIGHT RFA L2,3,4,5  2 of 2    RADIOFREQUENCY ABLATION Left 12/19/2019    Procedure: RADIOFREQUENCY ABLATION, LEFT L2-L3-L4-L5 MEDIAL BRANCH 1 OF 2;  Surgeon: Lina Wganer MD;  Location: [facility] PAIN MGT;  Service: Pain Management;  Laterality: Left;    RADIOFREQUENCY ABLATION Right 12/31/2019    Procedure: RADIOFREQUENCY ABLATION, RIGHT L2-L3-L4-L5  2 OF 2;  Surgeon: Lina Wagner MD;  Location: [facility] PAIN MGT;  Service: Pain Management;  Laterality: Right;    RADIOFREQUENCY ABLATION Right 10/13/2020    Procedure: RADIOFREQUENCY ABLATION, Genicular Cooled 1 of 2;  Surgeon: Lina Wagner MD;  Location: [facility] PAIN MGT;  Service: Pain Management;  Laterality: Right;    RADIOFREQUENCY ABLATION Left 11/3/2020    Procedure: RADIOFREQUENCY ABLATION, GENICULAR COOLED  2 OF 2;  Surgeon: Lina Wagner MD;  Location: [facility] PAIN MGT;  Service: Pain  Management;  Laterality: Left;    RADIOFREQUENCY ABLATION Left 12/15/2020    Procedure: RADIOFREQUENCY ABLATION LEFT L2,3,4,5 1 of 2;  Surgeon: Lina Wagner MD;  Location: [facility] PAIN MGT;  Service: Pain Management;  Laterality: Left;    RADIOFREQUENCY ABLATION Right 12/29/2020    Procedure: RADIOFREQUENCY ABLATION RIGHT L2,3,4,5 2 of 2;  Surgeon: Lina Wagner MD;  Location: [facility] PAIN MGT;  Service: Pain Management;  Laterality: Right;    RADIOFREQUENCY ABLATION Left 6/29/2021    Procedure: RADIOFREQUENCY ABLATION GENICULAR COOLED;  Surgeon: Lina Wagner MD;  Location: [facility] PAIN MGT;  Service: Pain Management;  Laterality: Left;  1 of 2    RADIOFREQUENCY ABLATION Right 7/13/2021    Procedure: RADIOFREQUENCY ABLATION GENICULAR;  Surgeon: Lina Wagner MD;  Location: [facility]IN [facility]GT;  Service: Pain Management;  Laterality: Right;  2 of 2    RADIOFREQUENCY ABLATION Right 8/24/2021    Procedure: RADIOFREQUENCY ABLATION, L2-L3-L4-L5 MEDIAL BRANCH 1 OF 2;  Surgeon: Lina Wagner MD;  Location: [facility] PAIN MGT;  Service: Pain Management;  Laterality: Right;    RADIOFREQUENCY ABLATION Left 9/11/2021    Procedure: RADIOFREQUENCY ABLATION, L2-L3-L4-L5 MEDIAL BR ANCH 2 OF 2;  Surgeon: Lina Wagner MD;  Location: [facility] PAIN MGT;  Service: Pain Management;  Laterality: Left;    RADIOFREQUENCY ABLATION OF LUMBAR MEDIAL BRANCH NERVE AT SINGLE LEVEL Left 6/26/2018    Procedure: RADIOFREQUENCY ABLATION, NERVE, MEDIAL BRANCH, LUMBAR, 1 LEVEL;  Surgeon: Lina Wagner MD;  Location: [facility] PAIN MGT;  Service: Pain Management;  Laterality: Left;  Left Cooled RFA @ L2,3,4,5  [id number]  with Sedation    1 of 2    RADIOFREQUENCY ABLATION OF LUMBAR MEDIAL BRANCH NERVE AT SINGLE LEVEL Right 7/10/2018    Procedure: RADIOFREQUENCY ABLATION, NERVE, MEDIAL BRANCH, LUMBAR, 1 LEVEL;  Surgeon: Lina Wagner MD;  Location: [facility]IN [facility]GT;  Service: Pain Management;  Laterality: Right;  RIght Cooled RFA @  "L2,3,4,5  [id number] with Sedation    2 of 2    TRIGGER FINGER RELEASE Right 2017    TRIGGER FINGER RELEASE Left 7/29/2019    Procedure: RELEASE, TRIGGER FINGER, Left Thumb;  Surgeon: Velma Garcia MD;  Location: [facility];  Service: Orthopedics;  Laterality: Left;  Local w/ MAC     Review of patient's allergies indicates:   Allergen Reactions    Strawberry Anaphylaxis      Current Facility-Administered Medications   Medication    0.9%  NaCl infusion     Facility-Administered Medications Ordered in Other Encounters   Medication    0.9%  NaCl infusion       PMHx, PSHx, Allergies, Medications reviewed in epic    ROS negative except pain complaints in HPI    OBJECTIVE:    /79 (BP Location: Right arm, Patient Position: Sitting)   Pulse 67   Temp 98 °F (36.7 °C) (Oral)   Resp 16   Ht 5' 5" (1.651 m)   Wt 108.9 kg (240 lb)   LMP 06/26/2018   SpO2 100%   BMI 39.94 kg/m²     PHYSICAL EXAMINATION:    GENERAL: Well appearing, in no acute distress, alert and oriented x3.  PSYCH:  Mood and affect appropriate.  SKIN: Skin color, texture, turgor normal, no rashes or lesions which will impact the procedure.  CV: RRR with palpation of the radial artery.  PULM: No evidence of respiratory difficulty, symmetric chest rise. Clear to auscultation.  NEURO: Cranial nerves grossly intact.    Plan:    Proceed with procedure as planned Procedure(s) (LRB):  RADIOFREQUENCY ABLATION RIGHT L3,4,5 1 of 2, needs consent (Right)    Oskar Wilkes  03/07/2022        "

## 2022-03-11 DIAGNOSIS — G89.4 CHRONIC PAIN SYNDROME: ICD-10-CM

## 2022-03-11 DIAGNOSIS — M47.816 LUMBAR SPONDYLOSIS: Primary | ICD-10-CM

## 2022-03-11 NOTE — TELEPHONE ENCOUNTER
----- Message from Tamiko Gee sent at 3/11/2022  1:01 PM CST -----  Can the clinic reply in [facility]: No         Please refill the medication(s) listed below. Please call the patient when the prescription(s) is ready for  at this phone number   [phone]         Medication #1 oxyCODONE-acetaminophen (PERCOCET)  mg per tablet                Preferred Pharmacy: [facility] - [address]

## 2022-03-11 NOTE — TELEPHONE ENCOUNTER
Patient requesting refill on Percocet 10/325mg  Last office visit 12.28.21   shows last refill on 02.10.22  Patient does have a pain contract on file with [facility] department  Patient last UDS 06.07.21 was consistent with current therapy    CODEINE  Not Detected   Not Detected   Not Detected  Not Detected     MORPHINE  Not Detected   Not Detected   Not Detected  Not Detected     6-ACETYLMORPHINE  Not Detected   Not Detected   Not Detected  Not Detected     OXYCODONE  Present   Not Detected   Not Detected  Present     NOROYXCODONE  Present   Present   Present  Present     OXYMORPHONE  Present   Not Detected   Not Detected  Not Detected     NOROXYMORPHONE  Not Detected   Not Detected   Not Detected  Not Detected     HYDROCODONE  Not Detected   Not Detected   Not Detected  Not Detected     NORHYDROCODONE  Not Detected   Not Detected   Not Detected  Not Detected     HYDROMORPHONE  Not Detected   Not Detected   Not Detected  Not Detected     BUPRENORPHINE  Not Detected   Not Detected   Not Detected  Not Detected     NORUBPRENORPHINE  Not Detected   Not Detected   Not Detected  Not Detected     FENTANYL  Not Detected   Not Detected   Not Detected  Not Detected     NORFENTANYL  Not Detected   Not Detected   Not Detected  Not Detected     MEPERIDINE METABOLITE  Not Detected   Not Detected   Not Detected  Not Detected     TAPENTADOL  Not Detected   Not Detected   Not Detected  Not Detected     TAPENTADOL-O-SULF  Not Detected   Not Detected   Not Detected  Not Detected     METHADONE  Not Detected   Not Detected   Not Detected  Not Detected     TRAMADOL  Not Detected   Not Detected   Not Detected  Not Detected     AMPHETAMINE  Not Detected   Not Detected   Not Detected  Not Detected     METHAMPHETAMINE  Not Detected   Not Detected   Not Detected  Not Detected     MDMA- ECSTASY  Not Detected   Not Detected   Not Detected  Not Detected     MDA  Not Detected   Not Detected   Not Detected  Not Detected      MDEA- Kait  Not Detected   Not Detected   Not Detected  Not Detected     METHYLPHENIDATE  Not Detected   Not Detected   Not Detected  Not Detected     PHENTERMINE  Not Detected   Not Detected   Not Detected  Not Detected     BENZOYLECGONINE  Not Detected   Not Detected   Not Detected  Not Detected     ALPRAZOLAM  Not Detected   Not Detected   Not Detected  Not Detected     ALPHA-OH-ALPRAZOLAM  Not Detected   Not Detected   Not Detected  Not Detected     CLONAZEPAM  Not Detected   Not Detected   Not Detected  Not Detected     7-AMINOCLONAZEPAM  Not Detected   Not Detected   Not Detected  Not Detected     DIAZEPAM  Not Detected   Not Detected   Not Detected  Not Detected     NORDIAZEPAM  Not Detected   Not Detected   Not Detected  Not Detected     OXAZEPAM  Not Detected   Not Detected   Not Detected  Not Detected     TEMAZEPAM  Not Detected   Not Detected   Not Detected  Not Detected     Lorazepam  Not Detected   Not Detected   Not Detected  Not Detected     MIDAZOLAM  Not Detected   Not Detected   Not Detected  Not Detected     ZOLPIDEM  Not Detected   Not Detected   Not Detected  Not Detected     BARBITURATES  Not Detected   Not Detected   Not Detected  Not Detected     Creatinine, Urine 20.0 - 400.0 mg/dL 93.0  86.0 R, CM  66.1  113.0 R, CM  91.8  163.4  301.0 R, CM    ETHYL GLUCURONIDE  Not Detected   Not Detected   Not Detected  Not Detected     MARIJUANA METABOLITE  Not Detected   Not Detected   Not Detected  Not Detected     PCP  Not Detected   Not Detected   Not Detected  Not Detected     CARISOPRODOL  Not Detected   Not Detected CM   Not Detected CM  Not Detected CM     Comment: The carisoprodol immunoassay has cross-reactivity to   carisoprodol and meprobamate.    Naloxone  Not Detected          Gabapentin  Not Detected          Pregabalin  Not Detected          Alpha-OH-Midazolam  Not Detected          Zolpidem Metabolite  Not Detected

## 2022-03-13 RX ORDER — OXYCODONE AND ACETAMINOPHEN 10; 325 MG/1; MG/1
1 TABLET ORAL EVERY 8 HOURS PRN
Qty: 90 TABLET | Refills: 0 | Status: SHIPPED | OUTPATIENT
Start: 2022-03-13 | End: 2022-04-11 | Stop reason: SDUPTHER

## 2022-03-21 ENCOUNTER — HOSPITAL ENCOUNTER (OUTPATIENT)
Facility: OTHER | Age: 57
Discharge: HOME | End: 2022-03-21
Attending: ANESTHESIOLOGY | Admitting: ANESTHESIOLOGY
Payer: MEDICARE

## 2022-03-21 VITALS
TEMPERATURE: 98 F | RESPIRATION RATE: 14 BRPM | WEIGHT: 240 LBS | SYSTOLIC BLOOD PRESSURE: 131 MMHG | DIASTOLIC BLOOD PRESSURE: 66 MMHG | OXYGEN SATURATION: 100 % | HEIGHT: 65 IN | HEART RATE: 55 BPM | BODY MASS INDEX: 39.99 KG/M2

## 2022-03-21 DIAGNOSIS — G89.29 CHRONIC PAIN: ICD-10-CM

## 2022-03-21 DIAGNOSIS — M47.816 LUMBAR SPONDYLOSIS: Primary | ICD-10-CM

## 2022-03-21 LAB — POCT GLUCOSE: 114 MG/DL (ref 70–110)

## 2022-03-21 PROCEDURE — 64635 PR DESTROY LUMB/SAC FACET JNT: ICD-10-PCS | Mod: LT,,, | Performed by: ANESTHESIOLOGY

## 2022-03-21 PROCEDURE — 99152 PR MOD CONSCIOUS SEDATION, SAME PHYS, 5+ YRS, FIRST 15 MIN: ICD-10-PCS | Mod: ,,, | Performed by: ANESTHESIOLOGY

## 2022-03-21 PROCEDURE — 64636 DESTROY L/S FACET JNT ADDL: CPT | Mod: LT | Performed by: ANESTHESIOLOGY

## 2022-03-21 PROCEDURE — 64635 DESTROY LUMB/SAC FACET JNT: CPT | Mod: LT,,, | Performed by: ANESTHESIOLOGY

## 2022-03-21 PROCEDURE — 25000003 PHARM REV CODE 250: Performed by: STUDENT IN AN ORGANIZED HEALTH CARE EDUCATION/TRAINING PROGRAM

## 2022-03-21 PROCEDURE — 63600175 PHARM REV CODE 636 W HCPCS: Performed by: ANESTHESIOLOGY

## 2022-03-21 PROCEDURE — 25000003 PHARM REV CODE 250: Performed by: ANESTHESIOLOGY

## 2022-03-21 PROCEDURE — 64636 DESTROY L/S FACET JNT ADDL: CPT | Mod: LT,,, | Performed by: ANESTHESIOLOGY

## 2022-03-21 PROCEDURE — 64635 DESTROY LUMB/SAC FACET JNT: CPT | Mod: LT | Performed by: ANESTHESIOLOGY

## 2022-03-21 PROCEDURE — 99152 MOD SED SAME PHYS/QHP 5/>YRS: CPT | Mod: ,,, | Performed by: ANESTHESIOLOGY

## 2022-03-21 PROCEDURE — 99152 MOD SED SAME PHYS/QHP 5/>YRS: CPT | Performed by: ANESTHESIOLOGY

## 2022-03-21 PROCEDURE — 64636 PR DESTROY L/S FACET JNT ADDL: ICD-10-PCS | Mod: LT,,, | Performed by: ANESTHESIOLOGY

## 2022-03-21 RX ORDER — LIDOCAINE HYDROCHLORIDE 20 MG/ML
INJECTION, SOLUTION INFILTRATION; PERINEURAL
Status: DISCONTINUED | OUTPATIENT
Start: 2022-03-21 | End: 2022-03-21 | Stop reason: HOSPADM

## 2022-03-21 RX ORDER — DEXAMETHASONE SODIUM PHOSPHATE 10 MG/ML
INJECTION INTRAMUSCULAR; INTRAVENOUS
Status: DISCONTINUED | OUTPATIENT
Start: 2022-03-21 | End: 2022-03-21 | Stop reason: HOSPADM

## 2022-03-21 RX ORDER — SODIUM CHLORIDE 9 MG/ML
500 INJECTION, SOLUTION INTRAVENOUS CONTINUOUS
Status: DISCONTINUED | OUTPATIENT
Start: 2022-03-21 | End: 2022-03-21 | Stop reason: HOSPADM

## 2022-03-21 RX ORDER — BUPIVACAINE HYDROCHLORIDE 2.5 MG/ML
INJECTION, SOLUTION EPIDURAL; INFILTRATION; INTRACAUDAL
Status: DISCONTINUED | OUTPATIENT
Start: 2022-03-21 | End: 2022-03-21 | Stop reason: HOSPADM

## 2022-03-21 RX ORDER — MIDAZOLAM HYDROCHLORIDE 1 MG/ML
INJECTION INTRAMUSCULAR; INTRAVENOUS
Status: DISCONTINUED | OUTPATIENT
Start: 2022-03-21 | End: 2022-03-21 | Stop reason: HOSPADM

## 2022-03-21 RX ORDER — FENTANYL CITRATE 50 UG/ML
INJECTION, SOLUTION INTRAMUSCULAR; INTRAVENOUS
Status: DISCONTINUED | OUTPATIENT
Start: 2022-03-21 | End: 2022-03-21 | Stop reason: HOSPADM

## 2022-03-21 RX ADMIN — SODIUM CHLORIDE 500 ML: 0.9 INJECTION, SOLUTION INTRAVENOUS at 08:03

## 2022-03-21 NOTE — OP NOTE
Therapeutic Lumbar Medial Branch Radiofrequency Ablation under Fluoroscopy     The procedure, risks, benefits, and options were discussed with the patient. There are no contraindications to the procedure. The patent expressed understanding and agreed to the procedure. Informed written consent was obtained prior to the start of the procedure and can be found in the patient's chart.        PATIENT NAME: Alivia Thomas   MRN: [id number]     DATE OF PROCEDURE: 03/21/2022     PROCEDURE:  Left L3, L4 and L5 Lumbar Radiofrequency Ablation under Fluoroscopy    PRE-OP DIAGNOSIS: Spondylosis of lumbar region without myelopathy or radiculopathy [M47.816]  Lumbar spondylosis [M47.816]    POST-OP DIAGNOSIS: Same    PHYSICIAN: Israel Hurtado MD    ASSISTANTS: Maximus Quan MD PGY-2 PM&R      MEDICATIONS INJECTED:  Preservative-free Decadron 10mg with 9cc of Bupivicaine 0.25%    LOCAL ANESTHETIC INJECTED:   Xylocaine 2%    SEDATION:   Versed 2mg and Fentanyl 25mcg                                                                                                                                                                                     Conscious sedation ordered by MAURELIA. Patient re-evaluation prior to administration of conscious sedation. No changes noted in patient's status from initial evaluation. The patient's vital signs were monitored by RN and patient remained hemodynamically stable throughout the procedure.    Event Time In   Sedation Start 0910   Sedation End 0924       ESTIMATED BLOOD LOSS:  None    COMPLICATIONS:  None     INTERVAL HISTORY: Patient has clinical and imaging findings suggestive of facet mediated pain. Patients has completed 2 previous diagnostic medial branch blocks at specified levels with at least 80% relief for the expected duration of the local anesthetic utilized.    TECHNIQUE: Time-out was performed to identify the patient and procedure to be performed. With the patient laying in a prone position, the  surgical area was prepped and draped in the usual sterile fashion using ChloraPrep and fenestrated drape. The levels were determined under fluoroscopic guidance. Skin anesthesia was achieved by injecting Lidocaine 2% over the injection sites. A 20 gauge 10mm curved active tip needle was introduced to the anatomic local of the medial branch at each of the above levels using AP, lateral and/or contralateral oblique fluoroscopic imaging. Then sensory and motor testing was performed to confirm that the needle tips were in the correct location. After negative aspiration for blood or CSF was confirmed, 1 mL of the lidocaine 2% listed above was injected slowly at each site. This was followed by thermal lesioning at 80 degrees celsius for 90 seconds. That was followed by slowly injecting 2 mL of the medication mixture listed above at each site. The needles were removed and bleeding was nil. A sterile dressing was applied. No specimens collected. The patient tolerated the procedure well and did not have any procedure related motor deficit at the conclusion of the procedure.  PAIN BEFORE THE PROCEDURE: 10/10    PAIN AFTER THE PROCEDURE: 0/10   The patient was monitored after the procedure in the recovery area. They were given post-procedure and discharge instructions to follow at home. The patient was discharged in a stable condition.        Israel Hurtado MD

## 2022-03-21 NOTE — H&P
HPI  Patient presenting for Procedure(s) (LRB):  RADIOFREQUENCY ABLATION RIGHT L3,4,5 2 of 2, needs consent (Right)     Patient on Anti-coagulation: Indomethacin only    Patient presents for right lumbar RFA as above, did left side two weeks ago without complications. No health changes since previous encounter    Past Medical History:   Diagnosis Date    Allergy     Anemia     Asthma     Bilateral shoulder bursitis     Cervical stenosis of spine     Chronic pain     DDD (degenerative disc disease), cervical     Depression 1/28/2019    Diabetes mellitus type II     DJD (degenerative joint disease) of knee 6/19/2014    Facet arthritis of lumbar region 12/17/2015    Facet syndrome 12/17/2015    GERD (gastroesophageal reflux disease)     Heartburn     Hyperlipidemia     Hypertension     Morbid obesity     Neuromuscular disorder     PADDY (obstructive sleep apnea)     Proteinuria     Right carpal tunnel syndrome     Sacroiliitis 6/13/2018    Sacroiliitis     Sleep apnea      Past Surgical History:   Procedure Laterality Date    CARPAL TUNNEL RELEASE      CARPAL TUNNEL RELEASE  1980s    left    CARPAL TUNNEL RELEASE  2012    right    COLONOSCOPY N/A 6/15/2020    Procedure: COLONOSCOPY;  Surgeon: Jc Koo MD;  Location: [facility] (4TH FLR);  Service: Endoscopy;  Laterality: N/A;  [facility]  pt updated on drop off location and no visitor policy-    ESOPHAGOGASTRODUODENOSCOPY N/A 3/27/2019    Procedure: EGD (ESOPHAGOGASTRODUODENOSCOPY);  Surgeon: Robbin Bar MD;  Location: [facility] (2ND FLR);  Service: Endoscopy;  Laterality: N/A;  BMI 61.3/2nd floor case/svn    INJECTION OF JOINT Bilateral 6/9/2020    Procedure: INJECTION, JOINT, BILATERAL SI;  Surgeon: Lina Wagner MD;  Location: [facility] [place] PAIN T;  Service: Pain Management;  Laterality: Bilateral;  B/L SI Joint Injection    INJECTION OF JOINT Bilateral 5/11/2021    Procedure: INJECTION,  JOINT, SACROILIAC (SI) need consent;  Surgeon: Lina Wagner MD;  Location: [facility] PAIN MGT;  Service: Pain Management;  Laterality: Bilateral;    JOINT REPLACEMENT Bilateral     with 2 revisions on rt    KNEE SURGERY  3/2010    orthroscope    KNEE SURGERY  6-19-14    left TKR    LAPAROSCOPIC SLEEVE GASTRECTOMY N/A 8/28/2019    Procedure: GASTRECTOMY, SLEEVE, LAPAROSCOPIC with intraop EGD;  Surgeon: Heriberto Clements MD;  Location: [facility] OR 2ND FLR;  Service: General;  Laterality: N/A;    RADIOFREQUENCY ABLATION Right 7/9/2019    Procedure: RADIOFREQUENCY ABLATION;  Surgeon: Lina Wagner MD;  Location: [facility] PAIN MGT;  Service: Pain Management;  Laterality: Right;  RIGHT RFA L2,3,4,5  2 of 2    RADIOFREQUENCY ABLATION Left 12/19/2019    Procedure: RADIOFREQUENCY ABLATION, LEFT L2-L3-L4-L5 MEDIAL BRANCH 1 OF 2;  Surgeon: Lina Wagner MD;  Location: [facility] PAIN MGT;  Service: Pain Management;  Laterality: Left;    RADIOFREQUENCY ABLATION Right 12/31/2019    Procedure: RADIOFREQUENCY ABLATION, RIGHT L2-L3-L4-L5  2 OF 2;  Surgeon: Lina Wagner MD;  Location: [facility] PAIN MGT;  Service: Pain Management;  Laterality: Right;    RADIOFREQUENCY ABLATION Right 10/13/2020    Procedure: RADIOFREQUENCY ABLATION, Genicular Cooled 1 of 2;  Surgeon: Lina Wagner MD;  Location: [facility] PAIN MGT;  Service: Pain Management;  Laterality: Right;    RADIOFREQUENCY ABLATION Left 11/3/2020    Procedure: RADIOFREQUENCY ABLATION, GENICULAR COOLED  2 OF 2;  Surgeon: Lina Wagner MD;  Location: [facility] PAIN MGT;  Service: Pain Management;  Laterality: Left;    RADIOFREQUENCY ABLATION Left 12/15/2020    Procedure: RADIOFREQUENCY ABLATION LEFT L2,3,4,5 1 of 2;  Surgeon: Lina Wagner MD;  Location: [facility] PAIN MGT;  Service: Pain Management;  Laterality: Left;    RADIOFREQUENCY ABLATION Right 12/29/2020    Procedure: RADIOFREQUENCY ABLATION RIGHT L2,3,4,5 2 of 2;  Surgeon: Lina Wagner MD;  Location:  [facility] PAIN MGT;  Service: Pain Management;  Laterality: Right;    RADIOFREQUENCY ABLATION Left 6/29/2021    Procedure: RADIOFREQUENCY ABLATION GENICULAR COOLED;  Surgeon: Lina Wagner MD;  Location: [facility] PAIN MGT;  Service: Pain Management;  Laterality: Left;  1 of 2    RADIOFREQUENCY ABLATION Right 7/13/2021    Procedure: RADIOFREQUENCY ABLATION GENICULAR;  Surgeon: Lina Wagner MD;  Location: [facility] PAIN MGT;  Service: Pain Management;  Laterality: Right;  2 of 2    RADIOFREQUENCY ABLATION Right 8/24/2021    Procedure: RADIOFREQUENCY ABLATION, L2-L3-L4-L5 MEDIAL BRANCH 1 OF 2;  Surgeon: Lina Wagner MD;  Location: [facility] PAIN MGT;  Service: Pain Management;  Laterality: Right;    RADIOFREQUENCY ABLATION Left 9/11/2021    Procedure: RADIOFREQUENCY ABLATION, L2-L3-L4-L5 MEDIAL BR ANCH 2 OF 2;  Surgeon: Lina Wagner MD;  Location: [facility] PAIN MGT;  Service: Pain Management;  Laterality: Left;    RADIOFREQUENCY ABLATION Right 3/7/2022    Procedure: RADIOFREQUENCY ABLATION RIGHT L3,4,5 1 of 2, needs consent;  Surgeon: Israel Hurtado MD;  Location: [facility] PAIN MGT;  Service: Pain Management;  Laterality: Right;    RADIOFREQUENCY ABLATION OF LUMBAR MEDIAL BRANCH NERVE AT SINGLE LEVEL Left 6/26/2018    Procedure: RADIOFREQUENCY ABLATION, NERVE, MEDIAL BRANCH, LUMBAR, 1 LEVEL;  Surgeon: Lina Wagner MD;  Location: [facility] PAIN MGT;  Service: Pain Management;  Laterality: Left;  Left Cooled RFA @ L2,3,4,5  [id number]  with Sedation    1 of 2    RADIOFREQUENCY ABLATION OF LUMBAR MEDIAL BRANCH NERVE AT SINGLE LEVEL Right 7/10/2018    Procedure: RADIOFREQUENCY ABLATION, NERVE, MEDIAL BRANCH, LUMBAR, 1 LEVEL;  Surgeon: Lina Wagner MD;  Location: [facility] PAIN MGT;  Service: Pain Management;  Laterality: Right;  RIght Cooled RFA @ L2,3,4,5  [id number] with Sedation    2 of 2    TRIGGER FINGER RELEASE Right 2017    TRIGGER FINGER RELEASE Left 7/29/2019    Procedure: RELEASE, TRIGGER FINGER, Left  Thumb;  Surgeon: Velma Garcia MD;  Location: [facility] OR;  Service: Orthopedics;  Laterality: Left;  Local w/ MAC     Review of patient's allergies indicates:   Allergen Reactions    Strawberry Anaphylaxis      No current facility-administered medications for this encounter.     Facility-Administered Medications Ordered in Other Encounters   Medication    0.9%  NaCl infusion       PMHx, PSHx, Allergies, Medications reviewed in epic    ROS negative except pain complaints in HPI    OBJECTIVE:    LMP 06/26/2018     PHYSICAL EXAMINATION:    GENERAL: Well appearing, in no acute distress, alert and oriented x3.  PSYCH:  Mood and affect appropriate.  SKIN: Skin color, texture, turgor normal, no rashes or lesions which will impact the procedure.  CV: RRR with palpation of the radial artery.  PULM: No evidence of respiratory difficulty, symmetric chest rise. Clear to auscultation.  NEURO: Cranial nerves grossly intact.    Plan:    Proceed with procedure as planned Procedure(s) (LRB):  RADIOFREQUENCY ABLATION RIGHT L3,4,5 2 of 2, needs consent (Right)    Maximus Quan  03/21/2022

## 2022-03-21 NOTE — DISCHARGE INSTRUCTIONS
Thank you for allowing us to care for you today. You may receive a survey about the care we provided. Your feedback is valuable and helps us provide excellent care throughout the community.     Home Care Instructions for Pain Management:    1. DIET:   You may resume your normal diet today.   2. BATHING:   You may shower with luke warm water. No tub baths or anything that will soak injection sites under water for the next 24 hours.  3. DRESSING:   You may remove your bandage today.   4. ACTIVITY LEVEL:   You may resume your normal activities 24 hrs after your procedure. Nothing strenuous today.  5. MEDICATIONS:   You may resume your normal medications today. To restart blood thinners, ask your doctor.  6. DRIVING    If you have received any sedatives by mouth today, you may not drive for 12 hours.    If you have received any sedation through your IV, you may not drive for 24 hrs.   7. SPECIAL INSTRUCTIONS:   No heat to the injection site for 24 hrs including, hot bath or shower, heating pad, moist heat, or hot tubs.    Use ice pack to injection site for any pain or discomfort.  Apply ice packs for 20 minute intervals as needed.    IF you have diabetes, be sure to monitor your blood sugar more closely. IF your injection contained steroids your blood sugar levels may become higher than normal.    If you are still having pain upon discharge:  Your pain may improve over the next 48 hours. The anesthetic (numbing medication) works immediately to 48 hours. IF your injection contained a steroid (anti-inflammatory medication), it takes approximately 3 days to start feeling relief and 7-10 days to see your greatest results from the medication. It is possible you may need subsequent injections. This would be discussed at your follow up appointment with pain management or your referring doctor.    Please call the PAIN MANAGEMENT office at [phone] or ON CALL pager at [phone] if you experienced any:   -Weakness or  loss of sensation  -Fever > 101.5  -Pain uncontrolled with oral medications   -Persistent nausea, vomiting, or diarrhea  -Redness or drainage from the injection sites, or any other worrisome concerns.   If physician on call was not reached or could not communicate with our office for any reason please go to the nearest emergency department.     Adult Procedural Sedation Instructions    Recovery After Procedural Sedation (Adult)  You have been given medicine by vein to make you sleep during your surgery. This may have included both a pain medicine and sleeping medicine. Most of the effects have worn off. But you may still have some drowsiness for the next 6 to 8 hours.  Home care  Follow these guidelines when you get home:  For the next 8 hours, you should be watched by a responsible adult. This person should make sure your condition is not getting worse.  Don't drink any alcohol for the next 24 hours.  Don't drive, operate dangerous machinery, or make important business or personal decisions during the next 24 hours.  Note: Your healthcare provider may tell you not to take any medicine by mouth for pain or sleep in the next 4 hours. These medicines may react with the medicines you were given in the hospital. This could cause a much stronger response than usual.  Follow-up care  Follow up with your healthcare provider if you are not alert and back to your usual level of activity within 12 hours.  When to seek medical advice  Call your healthcare provider right away if any of these occur:  Drowsiness gets worse  Weakness or dizziness gets worse  Repeated vomiting  You can't be awakened   Date Last Reviewed: 10/18/2016  © 7189-3426 [employer] [address] [place] [address], [place] [address]. All rights reserved. This information is not intended as a substitute for professional medical care. Always follow your healthcare professional's instructions.

## 2022-03-21 NOTE — BRIEF OP NOTE
Discharge Note  Short Stay      SUMMARY     Admit Date: 3/21/2022    Attending Physician: Israel Hurtado      Discharge Physician: Israel Hurtado      Discharge Date: 3/21/2022 9:25 AM    Procedure(s) (LRB):  RADIOFREQUENCY ABLATION RIGHT L3,4,5 2 of 2, needs consent (Left)    Final Diagnosis: Spondylosis of lumbar region without myelopathy or radiculopathy [M47.816]  Lumbar spondylosis [M47.816]    Disposition: Home or self care    Patient Instructions:   Current Discharge Medication List      CONTINUE these medications which have NOT CHANGED    Details   albuterol (VENTOLIN HFA) 90 mcg/actuation inhaler INHALE TWO PUFFS INTO LUNGS EVERY 4 TO 6 HOURS AS NEEDED FOR SHORTNESS OF BREATH AND FOR WHEEZING  Qty: 18 g, Refills: 1    Comments: Please consider 90 day supplies to promote better adherence  Associated Diagnoses: Asthma, well controlled, mild intermittent      allopurinoL (ZYLOPRIM) 300 MG tablet Take 1 tablet (300 mg total) by mouth 2 (two) times daily.  Qty: 180 tablet, Refills: 3    Associated Diagnoses: Gout, unspecified cause, unspecified chronicity, unspecified site      atorvastatin (LIPITOR) 20 MG tablet Take 1 tablet (20 mg total) by mouth once daily.  Qty: 90 tablet, Refills: 3      b complex vitamins tablet Take 1 tablet by mouth every other day.       calcium citrate/vitamin D2 (ISIAH-CITRATE ORAL) Take 500 mg by mouth 2 (two) times daily.      colchicine (MITIGARE) 0.6 mg Cap Take 1 capsule (0.6 mg total) by mouth daily as needed (flare up).  Qty: 60 capsule, Refills: 3    Associated Diagnoses: Gout, unspecified cause, unspecified chronicity, unspecified site      cyanocobalamin (VITAMIN B-12) 1000 MCG tablet Take 100 mcg by mouth every morning.      diclofenac sodium (VOLTAREN) 1 % Gel Apply 2 g topically 4 (four) times daily as needed. To painful area on the feet.  Qty: 500 g, Refills: 5    Comments: 5 x 100g tubes  Associated Diagnoses: Right foot pain; Enthesopathy of foot      FLUoxetine (PROZAC) 20  mg/5 mL (4 mg/mL) solution Take 5 mLs (20 mg total) by mouth once daily.  Qty: 450 mL, Refills: 3      fluticasone (FLONASE) 50 mcg/actuation nasal spray 1 spray by Each Nare route 2 (two) times daily as needed for Rhinitis.  Qty: 15 g, Refills: 0      indomethacin (INDOCIN) 50 MG capsule Take 1 capsule (50 mg total) by mouth 2 (two) times daily with meals.  Qty: 30 capsule, Refills: 2    Associated Diagnoses: Right foot pain; Enthesopathy of foot      LIDOcaine (XYLOCAINE) 5 % Oint ointment Apply topically as needed.      MULTIVIT-IRON-MIN-FOLIC ACID 3,500-18-0.4 UNIT-MG-MG ORAL CHEW Take 1 tablet by mouth 2 (two) times daily.       nystatin (MYCOSTATIN) powder Apply topically 2 (two) times daily.  Qty: 60 g, Refills: 3      omeprazole (PRILOSEC) 20 MG capsule Take 1 capsule (20 mg total) by mouth every morning.  Qty: 90 capsule, Refills: 3    Associated Diagnoses: Gastroesophageal reflux disease without esophagitis      oxybutynin (DITROPAN-XL) 5 MG TR24 Take 1 tablet (5 mg total) by mouth once daily.  Qty: 30 tablet, Refills: 11    Associated Diagnoses: Mixed incontinence urge and stress (male)(female)      oxyCODONE-acetaminophen (PERCOCET)  mg per tablet Take 1 tablet by mouth every 8 (eight) hours as needed for Pain.  Qty: 90 tablet, Refills: 0    Comments: Quantity prescribed more than 7 day supply? Yes, quantity medically necessary  Associated Diagnoses: Lumbar spondylosis; Chronic pain syndrome      vitamin D 185 MG Tab Take 5,000 mg by mouth every morning.                  Discharge Diagnosis: Spondylosis of lumbar region without myelopathy or radiculopathy [M47.816]  Lumbar spondylosis [M47.816]  Condition on Discharge: Stable with no complications to procedure   Diet on Discharge: Same as before.  Activity: as per instruction sheet.  Discharge to: Home with a responsible adult.  Follow up: 2-4 weeks       Please call my office or pager at [phone] if experienced any weakness or loss of  sensation, fever > 101.5, pain uncontrolled with oral medications, persistent nausea/vomiting/or diarrhea, redness or drainage from the incisions, or any other worrisome concerns. If physician on call was not reached or could not communicate with our office for any reason please go to the nearest emergency department

## 2022-04-07 ENCOUNTER — TELEPHONE (OUTPATIENT)
Dept: PAIN MEDICINE | Facility: CLINIC | Age: 57
End: 2022-04-07
Payer: MEDICARE

## 2022-04-07 ENCOUNTER — PATIENT MESSAGE (OUTPATIENT)
Dept: BARIATRICS | Facility: CLINIC | Age: 57
End: 2022-04-07
Payer: MEDICARE

## 2022-04-07 NOTE — TELEPHONE ENCOUNTER
Too Soon for refill(patient last filled date was 3/14/22 and last office visit was 12/28/21)    Pt is schedule for an office for 4/19/22 with Pain Provider

## 2022-04-07 NOTE — TELEPHONE ENCOUNTER
----- Message from Tamiko Stein sent at 4/7/2022  8:32 AM CDT -----      Can the clinic reply in [facility]:N        Please refill the medication(s) listed below. Please call the patient when the prescription(s) is ready for  at this phone number         Medication #1 oxyCODONE-acetaminophen (PERCOCET)  mg per tablet    Medication #2       Preferred Pharmacy: [facility] [address]

## 2022-04-11 DIAGNOSIS — M47.816 LUMBAR SPONDYLOSIS: ICD-10-CM

## 2022-04-11 DIAGNOSIS — G89.4 CHRONIC PAIN SYNDROME: ICD-10-CM

## 2022-04-11 RX ORDER — OXYCODONE AND ACETAMINOPHEN 10; 325 MG/1; MG/1
1 TABLET ORAL EVERY 8 HOURS PRN
Qty: 90 TABLET | Refills: 0 | Status: SHIPPED | OUTPATIENT
Start: 2022-04-11 | End: 2022-05-10 | Stop reason: SDUPTHER

## 2022-04-11 RX ORDER — OXYCODONE AND ACETAMINOPHEN 10; 325 MG/1; MG/1
1 TABLET ORAL EVERY 8 HOURS PRN
Qty: 90 TABLET | Refills: 0 | Status: SHIPPED | OUTPATIENT
Start: 2022-04-13 | End: 2022-04-11 | Stop reason: SDUPTHER

## 2022-04-11 RX ORDER — OXYCODONE AND ACETAMINOPHEN 10; 325 MG/1; MG/1
1 TABLET ORAL EVERY 8 HOURS PRN
Qty: 90 TABLET | Refills: 0 | Status: CANCELLED | OUTPATIENT
Start: 2022-04-11

## 2022-04-11 RX ORDER — OXYCODONE AND ACETAMINOPHEN 10; 325 MG/1; MG/1
1 TABLET ORAL EVERY 8 HOURS PRN
Qty: 90 TABLET | Refills: 0 | OUTPATIENT
Start: 2022-04-11

## 2022-04-11 NOTE — TELEPHONE ENCOUNTER
Staff returned call to patient in regards to her message.      Staff informed patient that medication Oxycodone was denied due to last office visit being 12/28/21 (which was a virtual visit with NP Adeola Robledo). And informed patient she is required to have an office visit which was schedule for 4/19/22 with Pain Provider Dr. Israel Hurtado MD      Pt stated she's out of medication and did have a virtual appt and a procedure. Staff did inform patient that she is correct about the virtual visit however it was on 12/28/21 with NP and she's still in require to follow up for a 3 month follow up (staff did offer patient an appointment which was the next available on 4/13/22 with NP).       Staff informed patient that they're one NP working today and nothing was available today and did apologized.    Pt went on to stating she's coming up to the clinic to have the provider see her because she needs her medication (staff checked  which indicated that last filled date was 3/14/22).    Staff apologized to patient and informed her that provider is away from clinic and still insisted on getting her schedule with an NP to have medication filled.    Pt stated that it's the doctor fault for putting in her medication late to why she's out.

## 2022-04-11 NOTE — TELEPHONE ENCOUNTER
Patient didn't have a 3 month follow scheduled after her VV with Adeola Robledo in December. Patient did have two RFA's that put her follow up in April.     Patient is establishing care with Dr. Hurtado.

## 2022-04-11 NOTE — TELEPHONE ENCOUNTER
----- Message from Mervat Mittal sent at 4/11/2022  1:05 PM CDT -----  Regarding: Patient call back  Who called:DONTAE MOON [[id number]]    What is the request in detail: Patient is requesting a call back. She states she is out of her oxyCODONE-acetaminophen (PERCOCET)  mg per tablet and needs it filled today. She states she has been trying to get it filled for a while and has not heard back from the staff. She states she will be in the office within the hour as she is very frustrated about her medication not being filled and finds it unacceptable and she is in pain. She would like to further discuss.   Please advise.    Can the clinic reply by [facility]? No    Best call back number: [phone]    Additional Information: N/A

## 2022-04-12 ENCOUNTER — TELEPHONE (OUTPATIENT)
Dept: PAIN MEDICINE | Facility: CLINIC | Age: 57
End: 2022-04-12
Payer: MEDICARE

## 2022-04-12 ENCOUNTER — PATIENT MESSAGE (OUTPATIENT)
Dept: BARIATRICS | Facility: CLINIC | Age: 57
End: 2022-04-12
Payer: MEDICARE

## 2022-04-12 NOTE — TELEPHONE ENCOUNTER
----- Message from Bindu Suero sent at 4/12/2022  8:06 AM CDT -----  Regarding: Call Back Request  Name of Who is Calling: DONTAE MOON [[id number]]           What is the request in detail: Patient is requesting a call back from Eunice to discuss medication. Please assist.           Can the clinic reply by [facility]: NO           What Number [facility] [phone]

## 2022-04-18 ENCOUNTER — OFFICE VISIT (OUTPATIENT)
Dept: PODIATRY | Facility: CLINIC | Age: 57
End: 2022-04-18
Payer: MEDICARE

## 2022-04-18 VITALS — HEIGHT: 65 IN | WEIGHT: 240 LBS | BODY MASS INDEX: 39.99 KG/M2

## 2022-04-18 DIAGNOSIS — L84 CORNS AND CALLUS: ICD-10-CM

## 2022-04-18 DIAGNOSIS — E11.49 TYPE II DIABETES MELLITUS WITH NEUROLOGICAL MANIFESTATIONS: Primary | ICD-10-CM

## 2022-04-18 DIAGNOSIS — B35.1 DERMATOPHYTOSIS OF NAIL: ICD-10-CM

## 2022-04-18 PROCEDURE — 99499 NO LOS: ICD-10-PCS | Mod: S$PBB,,, | Performed by: PODIATRIST

## 2022-04-18 PROCEDURE — 11721 ROUTINE FOOT CARE: ICD-10-PCS | Mod: 59,Q9,S$PBB, | Performed by: PODIATRIST

## 2022-04-18 PROCEDURE — 11056 ROUTINE FOOT CARE: ICD-10-PCS | Mod: Q9,S$PBB,, | Performed by: PODIATRIST

## 2022-04-18 PROCEDURE — 99999 PR PBB SHADOW E&M-EST. PATIENT-LVL III: CPT | Mod: PBBFAC,,, | Performed by: PODIATRIST

## 2022-04-18 PROCEDURE — 99213 OFFICE O/P EST LOW 20 MIN: CPT | Mod: PBBFAC,PN | Performed by: PODIATRIST

## 2022-04-18 PROCEDURE — 11721 DEBRIDE NAIL 6 OR MORE: CPT | Mod: Q9,PBBFAC,PN | Performed by: PODIATRIST

## 2022-04-18 PROCEDURE — 99999 PR PBB SHADOW E&M-EST. PATIENT-LVL III: ICD-10-PCS | Mod: PBBFAC,,, | Performed by: PODIATRIST

## 2022-04-18 PROCEDURE — 11056 PARNG/CUTG B9 HYPRKR LES 2-4: CPT | Mod: Q9,S$PBB,, | Performed by: PODIATRIST

## 2022-04-18 PROCEDURE — 99499 UNLISTED E&M SERVICE: CPT | Mod: S$PBB,,, | Performed by: PODIATRIST

## 2022-04-18 NOTE — PATIENT INSTRUCTIONS
How to Check Your Feet    Below are tips to help you look for foot problems. Try to check your feet at the same time each day, such as when you get out of bed in the morning.    Check the top of each foot. The tops of toes, back of the heel, and outer edge of the foot can get a lot of rubbing from poor-fitting shoes.    Check the bottom of each foot. Daily wear and tear often leads to problems at pressure spots.    Check the toes and nails. Fungal infections often occur between toes. Toenail problems can also be a sign of fungal infections or lead to breaks in the skin.    Check your shoes, too. Loose objects inside a shoe can injure the foot. Use your hand to feel inside your shoes for things like alie, loose stitching, or rough areas that could irritate your skin.        Diabetic Foot Care    Diabetes can lead to a number of different foot complications. Fortunately, most of these complications can be prevented with a little extra foot care. If diabetes is not well controlled, the high blood sugar can cause damage to blood vessels and result in poor circulation to the foot. When the skin does not get enough blood flow, it becomes prone to pressure sores and ulcers, which heal slowly.  High blood sugar can also damage nerves, interfering with the ability to feel pain and pressure. When you cant feel your foot normally, it is easy to injure your skin, bones and joints without knowing it. For these reasons diabetes increases the risk of fungal infections, bunions and ulcers. Deep ulcers can lead to bone infection. Gangrene is the most serious foot complication of diabetes. It usually occurs on the tips of the toes as blacked areas of skin. The black area is dead tissue. In severe cases, gangrene spreads to involve the entire toe, other toes and the entire foot. Foot or toe amputation may be required. Good foot care and blood sugar control can prevent this.    Home Care  Wear comfortable, proper fitting  shoes.  Wash your feet daily with warm water and mild soap.  After drying, apply a moisturizing cream or lotion.  Check your feet daily for skin breaks, blisters, swelling, or redness. Look between your toes also.  Wear cotton socks and change them every day.  Trim toe nails carefully and do not cut your cuticles.  Strive to keep your blood sugar under control with a combination of medicines, diet and activity.  If you smoke and have diabetes, it is very important that you stop. Smoking reduces blood flow to your foot.  Avoid activities that increase your risk of foot injury:  Do not walk barefoot.  Do not use heating pads or hot water bottles on your feet.  Do not put your foot in a hot tub without first checking the temperature with your hand.  10) Schedule yearly foot exams.    Follow Up  with your doctor or as advised by our staff. Report any cut, puncture, scrape, other injury, blister, ingrown toenail or ulcer on your foot.    Get Prompt Medical Attention  if any of the following occur:  -- Open ulcer with pus draining from the wound  -- Increasing foot or leg pain  -- New areas of redness or swelling or tender areas of the foot    © 1171-0335 [employer]. [address] [place] [address], [place] [address]. All rights reserved. This information is not intended as a substitute for professional medical care. Always follow your healthcare professional's instructions.     General nail care measures for abnormal nails include:  Keeping nails trimmed short, but avoid overzealous trimming  Avoiding trauma   Avoiding contact irritants   Keeping nails dry (avoiding wet work)  Avoiding all nail cosmetics  Wearing shoes that fit well at the toe box.  Avoid tight shoes.

## 2022-04-18 NOTE — PROGRESS NOTES
Chief Complaint   Patient presents with    Diabetic Foot Exam     Clarisse Richardson MD 11-19-21             HPI:   The patient is a 56 y.o.  female  who presents for a diabetic foot exam/care   Patient reports + presence of abnormal sensation to the feet, just sometimes, mostly at night.   Patient has no history of wounds on the feet.   Hx of foot surgery: none.     Shoe gear: casual shoes  This patient has documented high risk feet requiring routine maintenance secondary to diabetes.     Main concern today is need for toenail trimming. Routine trimming helps with toe pain.   She is using vaseline/voltaren gel/lidocaine topical compound for heel pain.         Primary care doctor is: Clarisse Richardson MD  Patient last saw primary care doctor on:   11/19/2021        Patient Active Problem List   Diagnosis    Morbid obesity    PADDY on CPAP    Type 2 diabetes mellitus, uncontrolled    Nuclear sclerosis - Both Eyes    Hyperlipemia    Proteinuria    Bilateral knee pain    DJD (degenerative joint disease) of knee    Debility    Prosthetic joint implant failure    Failed total knee arthroplasty    Right knee pain    Knee pain    S/P total knee replacement    Lumbago    CTS (carpal tunnel syndrome)    Right carpal tunnel syndrome    Chronic, continuous use of opioids    Mild intermittent asthma without complication    Left arm pain    Left shoulder pain    Allergic reaction to food    DDD (degenerative disc disease), cervical    Cervical radiculopathy    Cervical spondylosis    Cubital tunnel syndrome on right    Bilateral shoulder bursitis    Cervical stenosis of spinal canal    DDD (degenerative disc disease), thoracic    Thoracic spondylosis    Spondylolisthesis of lumbar region    Lumbar spondylosis    Spondylolisthesis of cervical region    Cervical spine instability    Myeloradiculopathy    Neural foraminal stenosis of cervical spine    DDD (degenerative disc disease), lumbar     Idiopathic scoliosis of thoracolumbar spine    Bilateral shoulder region arthritis    Impingement syndrome of right shoulder    Trochanteric bursitis of both hips    Chronic pain    Depression    Recurrent major depressive disorder, in partial remission    GERD (gastroesophageal reflux disease)    Trigger finger    Dyspnea    BMI 45.0-49.9, adult    Asthma    Sacroiliac joint pain    Spondylosis of lumbosacral region without myelopathy or radiculopathy    Subacromial bursitis of left shoulder joint    Sacroiliitis    Snoring           Current Outpatient Medications on File Prior to Visit   Medication Sig Dispense Refill    albuterol (VENTOLIN HFA) 90 mcg/actuation inhaler INHALE TWO PUFFS INTO LUNGS EVERY 4 TO 6 HOURS AS NEEDED FOR SHORTNESS OF BREATH AND FOR WHEEZING 18 g 1    allopurinoL (ZYLOPRIM) 300 MG tablet Take 1 tablet (300 mg total) by mouth 2 (two) times daily. 180 tablet 3    atorvastatin (LIPITOR) 20 MG tablet Take 1 tablet (20 mg total) by mouth once daily. 90 tablet 3    b complex vitamins tablet Take 1 tablet by mouth every other day.       calcium citrate/vitamin D2 (ISIAH-CITRATE ORAL) Take 500 mg by mouth 2 (two) times daily.      colchicine (MITIGARE) 0.6 mg Cap Take 1 capsule (0.6 mg total) by mouth daily as needed (flare up). 60 capsule 3    cyanocobalamin (VITAMIN B-12) 1000 MCG tablet Take 100 mcg by mouth every morning.      diclofenac sodium (VOLTAREN) 1 % Gel Apply 2 g topically 4 (four) times daily as needed. To painful area on the feet. 500 g 5    FLUoxetine (PROZAC) 20 mg/5 mL (4 mg/mL) solution Take 5 mLs (20 mg total) by mouth once daily. 450 mL 3    fluticasone (FLONASE) 50 mcg/actuation nasal spray 1 spray by Each Nare route 2 (two) times daily as needed for Rhinitis. 15 g 0    indomethacin (INDOCIN) 50 MG capsule Take 1 capsule (50 mg total) by mouth 2 (two) times daily with meals. 30 capsule 2    LIDOcaine (XYLOCAINE) 5 % Oint ointment Apply topically  as needed.      MULTIVIT-IRON-MIN-FOLIC ACID 3,500-18-0.4 UNIT-MG-MG ORAL CHEW Take 1 tablet by mouth 2 (two) times daily.       nystatin (MYCOSTATIN) powder Apply topically 2 (two) times daily. 60 g 3    omeprazole (PRILOSEC) 20 MG capsule Take 1 capsule (20 mg total) by mouth every morning. 90 capsule 3    oxyCODONE-acetaminophen (PERCOCET)  mg per tablet Take 1 tablet by mouth every 8 (eight) hours as needed for Pain. 90 tablet 0    vitamin D 185 MG Tab Take 5,000 mg by mouth every morning.       oxybutynin (DITROPAN-XL) 5 MG TR24 Take 1 tablet (5 mg total) by mouth once daily. 30 tablet 11     Current Facility-Administered Medications on File Prior to Visit   Medication Dose Route Frequency Provider Last Rate Last Admin    0.9%  NaCl infusion   Intravenous Continuous Lina Wagner MD 25 mL/hr at 12/15/20 1506 25 mL/hr at 12/15/20 1506       Review of patient's allergies indicates:  No Known Allergies                  ROS:  General ROS: negative  Respiratory ROS: no cough, shortness of breath, or wheezing  Cardiovascular ROS: no chest pain or dyspnea on exertion  Musculoskeletal ROS: negative  Neurological ROS:   negative for - gait disturbance, + numbness/tingling, seizures or tremors  Dermatological ROS: + nail changes, dry skin          LAST HbA1c:   Hemoglobin A1C   Date Value Ref Range Status   11/08/2021 6.3 (H) 4.0 - 5.6 % Final     Comment:     ADA Screening Guidelines:  5.7-6.4%  Consistent with prediabetes  >or=6.5%  Consistent with diabetes    High levels of fetal hemoglobin interfere with the HbA1C  assay. Heterozygous hemoglobin variants (HbS, HgC, etc)do  not significantly interfere with this assay.   However, presence of multiple variants may affect accuracy.     05/03/2021 6.5 (H) 4.0 - 5.6 % Final     Comment:     ADA Screening Guidelines:  5.7-6.4%  Consistent with prediabetes  >or=6.5%  Consistent with diabetes    High levels of fetal hemoglobin interfere with the  "HbA1C  assay. Heterozygous hemoglobin variants (HbS, HgC, etc)do  not significantly interfere with this assay.   However, presence of multiple variants may affect accuracy.     11/05/2020 6.1 (H) 4.0 - 5.6 % Final     Comment:     ADA Screening Guidelines:  5.7-6.4%  Consistent with prediabetes  >or=6.5%  Consistent with diabetes  High levels of fetal hemoglobin interfere with the HbA1C  assay. Heterozygous hemoglobin variants (HbS, HgC, etc)do  not significantly interfere with this assay.   However, presence of multiple variants may affect accuracy.           EXAM:   Vitals:    04/18/22 0923   Weight: 108.9 kg (240 lb)   Height: 5' 5" (1.651 m)       General: Pt. is well-developed, well-nourished, appears stated age, in no acute distress, alert and oriented x 3.      Vascular: Dorsalis pedis and posterior tibial pulses are 2/4 bilaterally. 3 secs capillary refill time and toes are warm to touch.    There is reduced hair growth on the feet.    There is 1+ edema.  no varicosities.      Neurological:  Light touch, proprioception, and sharp/dull sensation are all intact bilaterally. Protective threshold with the Midnight-Lindsay monofilament is diminished bilaterally.   +paresthesias      Dermatological:  The skin is thin    The toenails  1-5 L and 1-5 R  are greenish- yellow, long by 5-6mm and thickened by 2-3mm with subungual debris  .   There are no open wounds. There is no ecchymoses.  no erythema noted.   Skin diffusely dry on the soles     Interdigital spaces are intact, no fissures    Skin atrophic, thin, dry and mildly hyperpigmented.       Musculoskeletal:    Muscle strength is 5/5 in all groups bilaterally.    Metatarsophalangeal, subtalar, and ankle range of motion are intact without crepitus.     Ankle equinus bilateral       Assessment / Plan:      ICD-10-CM ICD-9-CM    1. Type II diabetes mellitus with neurological manifestations  E11.49 250.60    2. Dermatophytosis of nail  B35.1 110.1    3. Corns and " callus  L84 700        · I counseled the patient on the patient's conditions, their implications and medical management.   Shoe inspection.      Continue good nutrition and blood sugar control to help prevent podiatric complications of diabetes.    Maintain proper foot hygiene.    Continue wearing proper shoe gear, daily foot inspections, never walking without protective shoe gear, never putting sharp instruments to feet.  Shoe and activity modification as needed for relief.         Routine Foot Care    Date/Time: 4/18/2022 9:15 AM  Performed by: Stacey Rowland DPM  Authorized by: Stacey Rowland DPM     Consent Done?:  Yes (Verbal)  Hyperkeratotic Skin Lesions?: Yes    Number of trimmed lesions:  2  Location(s):  Left Heel and Right Heel    Nail Care Type:  Debride  Location(s): All  (Left 1st Toe, Left 3rd Toe, Left 2nd Toe, Left 4th Toe, Left 5th Toe, Right 1st Toe, Right 2nd Toe, Right 3rd Toe, Right 4th Toe and Right 5th Toe)  Patient tolerance:  Patient tolerated the procedure well with no immediate complications     With patient's permission, the toenails mentioned above were reduced and debrided using a nail nipper, removing offending nail and debris.   Utilizing a #15 scalpel, I trimmed the corns and calluses at the above mentioned location.      The patient will continue to monitor the areas daily, inspect the feet, wear protective shoe gear when ambulatory, and moisturizer to maintain skin integrity.

## 2022-04-19 ENCOUNTER — OFFICE VISIT (OUTPATIENT)
Dept: PAIN MEDICINE | Facility: CLINIC | Age: 57
End: 2022-04-19
Attending: ANESTHESIOLOGY
Payer: MEDICARE

## 2022-04-19 VITALS
DIASTOLIC BLOOD PRESSURE: 84 MMHG | WEIGHT: 242.31 LBS | SYSTOLIC BLOOD PRESSURE: 136 MMHG | HEIGHT: 65 IN | BODY MASS INDEX: 40.37 KG/M2 | RESPIRATION RATE: 17 BRPM | HEART RATE: 60 BPM

## 2022-04-19 DIAGNOSIS — M47.816 SPONDYLOSIS OF LUMBAR REGION WITHOUT MYELOPATHY OR RADICULOPATHY: ICD-10-CM

## 2022-04-19 DIAGNOSIS — M25.562 CHRONIC PAIN OF BOTH KNEES: ICD-10-CM

## 2022-04-19 DIAGNOSIS — M17.0 PRIMARY OSTEOARTHRITIS OF BOTH KNEES: Primary | ICD-10-CM

## 2022-04-19 DIAGNOSIS — G89.29 CHRONIC PAIN OF BOTH KNEES: ICD-10-CM

## 2022-04-19 DIAGNOSIS — T84.018D FAILURE OF TOTAL KNEE REPLACEMENT, SUBSEQUENT ENCOUNTER: ICD-10-CM

## 2022-04-19 DIAGNOSIS — Z96.659 FAILURE OF TOTAL KNEE REPLACEMENT, SUBSEQUENT ENCOUNTER: ICD-10-CM

## 2022-04-19 DIAGNOSIS — M25.561 CHRONIC PAIN OF BOTH KNEES: ICD-10-CM

## 2022-04-19 PROCEDURE — 99999 PR PBB SHADOW E&M-EST. PATIENT-LVL III: ICD-10-PCS | Mod: PBBFAC,,, | Performed by: ANESTHESIOLOGY

## 2022-04-19 PROCEDURE — 99214 OFFICE O/P EST MOD 30 MIN: CPT | Mod: S$PBB,,, | Performed by: ANESTHESIOLOGY

## 2022-04-19 PROCEDURE — 99214 PR OFFICE/OUTPT VISIT, EST, LEVL IV, 30-39 MIN: ICD-10-PCS | Mod: S$PBB,,, | Performed by: ANESTHESIOLOGY

## 2022-04-19 PROCEDURE — 99999 PR PBB SHADOW E&M-EST. PATIENT-LVL III: CPT | Mod: PBBFAC,,, | Performed by: ANESTHESIOLOGY

## 2022-04-19 PROCEDURE — 99213 OFFICE O/P EST LOW 20 MIN: CPT | Mod: PBBFAC | Performed by: ANESTHESIOLOGY

## 2022-04-19 NOTE — PROGRESS NOTES
Subjective:      Patient ID: Alivia Thomas is a 56 y.o. female.    Chief Complaint: No chief complaint on file.    Referred by: No ref. provider found     The patient returns to clinic today for follow-up bilateral L3, 4, 5 RFA last month.  She reports that she is feeling very well following the procedure and reports 60-70% pain relief.  Today, she reports that her bilateral knee pain has continued to worsen, and she would like to go ahead and repeat bilateral genicular RFA as soon as possible.  She denies any new numbness, tingling, weakness, saddle anesthesia or bowel/bladder incontinence.        Past Medical History:   Diagnosis Date    Allergy     Anemia     Asthma     Bilateral shoulder bursitis     Cervical stenosis of spine     Chronic pain     DDD (degenerative disc disease), cervical     Depression 1/28/2019    Diabetes mellitus type II     DJD (degenerative joint disease) of knee 6/19/2014    Facet arthritis of lumbar region 12/17/2015    Facet syndrome 12/17/2015    GERD (gastroesophageal reflux disease)     Heartburn     Hyperlipidemia     Hypertension     Morbid obesity     Neuromuscular disorder     PADDY (obstructive sleep apnea)     Proteinuria     Right carpal tunnel syndrome     Sacroiliitis 6/13/2018    Sacroiliitis     Sleep apnea        Past Surgical History:   Procedure Laterality Date    CARPAL TUNNEL RELEASE      CARPAL TUNNEL RELEASE  1980s    left    CARPAL TUNNEL RELEASE  2012    right    COLONOSCOPY N/A 6/15/2020    Procedure: COLONOSCOPY;  Surgeon: Jc Koo MD;  Location: [facility] (4TH FLR);  Service: Endoscopy;  Laterality: N/A;  COVID screening on 6/13/20 at [facility]  pt updated on drop off location and no visitor [facility]-    ESOPHAGOGASTRODUODENOSCOPY N/A 3/27/2019    Procedure: EGD (ESOPHAGOGASTRODUODENOSCOPY);  Surgeon: Robbin aBr MD;  Location: [facility] (2ND FLR);  Service: Endoscopy;  Laterality: N/A;  BMI 61.3/2nd floor  case/svn    INJECTION OF JOINT Bilateral 6/9/2020    Procedure: INJECTION, JOINT, BILATERAL SI;  Surgeon: Lina Wagner MD;  Location: [facility] PAIN MGT;  Service: Pain Management;  Laterality: Bilateral;  B/L SI Joint Injection    INJECTION OF JOINT Bilateral 5/11/2021    Procedure: INJECTION, JOINT, SACROILIAC (SI) need consent;  Surgeon: Lina Wagner MD;  Location: [facility] PAIN MGT;  Service: Pain Management;  Laterality: Bilateral;    JOINT REPLACEMENT Bilateral     with 2 revisions on rt    KNEE SURGERY  3/2010    orthroscope    KNEE SURGERY  6-19-14    left TKR    LAPAROSCOPIC SLEEVE GASTRECTOMY N/A 8/28/2019    Procedure: GASTRECTOMY, SLEEVE, LAPAROSCOPIC with intraop EGD;  Surgeon: Heriberto Clements MD;  [facility];  Service: General;  Laterality: N/A;    RADIOFREQUENCY ABLATION Right 7/9/2019    Procedure: RADIOFREQUENCY ABLATION;  Surgeon: Lina Wagner MD;  Location: [facility] PAIN MGT;  Service: Pain Management;  Laterality: Right;  RIGHT RFA L2,3,4,5  2 of 2    RADIOFREQUENCY ABLATION Left 12/19/2019    Procedure: RADIOFREQUENCY ABLATION, LEFT L2-L3-L4-L5 MEDIAL BRANCH 1 OF 2;  Surgeon: Lina Wagner MD;  Location: [facility] PAIN MGT;  Service: Pain Management;  Laterality: Left;    RADIOFREQUENCY ABLATION Right 12/31/2019    Procedure: RADIOFREQUENCY ABLATION, RIGHT L2-L3-L4-L5  2 OF 2;  Surgeon: Lina Wagner MD;  Location: [facility] PAIN MGT;  Service: Pain Management;  Laterality: Right;    RADIOFREQUENCY ABLATION Right 10/13/2020    Procedure: RADIOFREQUENCY ABLATION, Genicular Cooled 1 of 2;  Surgeon: Lina Wagner MD;  Location: [facility] PAIN MGT;  Service: Pain Management;  Laterality: Right;    RADIOFREQUENCY ABLATION Left 11/3/2020    Procedure: RADIOFREQUENCY ABLATION, GENICULAR COOLED  2 OF 2;  Surgeon: Lina Wanger MD;  Location: [facility] PAIN MGT;  Service: Pain Management;  Laterality: Left;    RADIOFREQUENCY ABLATION Left 12/15/2020    Procedure:  RADIOFREQUENCY ABLATION LEFT L2,3,4,5 1 of 2;  Surgeon: Lina Wagner MD;  Location: [facility] PAIN MGT;  Service: Pain Management;  Laterality: Left;    RADIOFREQUENCY ABLATION Right 12/29/2020    Procedure: RADIOFREQUENCY ABLATION RIGHT L2,3,4,5 2 of 2;  Surgeon: Lina Wagner MD;  Location: [facility] PAIN [facility]GT;  Service: Pain Management;  Laterality: Right;    RADIOFREQUENCY ABLATION Left 6/29/2021    Procedure: RADIOFREQUENCY ABLATION GENICULAR COOLED;  Surgeon: Lina Wagner MD;  Location: [facility]IN [facility]GT;  Service: Pain Management;  Laterality: Left;  1 of 2    RADIOFREQUENCY ABLATION Right 7/13/2021    Procedure: RADIOFREQUENCY ABLATION GENICULAR;  Surgeon: Lina Wagner MD;  Location: [facility] PAIN MGT;  Service: Pain Management;  Laterality: Right;  2 of 2    RADIOFREQUENCY ABLATION Right 8/24/2021    Procedure: RADIOFREQUENCY ABLATION, L2-L3-L4-L5 MEDIAL BRANCH 1 OF 2;  Surgeon: Lina Wagner MD;  Location: [facility] PAIN MGT;  Service: Pain Management;  Laterality: Right;    RADIOFREQUENCY ABLATION Left 9/11/2021    Procedure: RADIOFREQUENCY ABLATION, L2-L3-L4-L5 MEDIAL BR ANCH 2 OF 2;  Surgeon: Lina Wagner MD;  Location: [facility] PAIN MGT;  Service: Pain Management;  Laterality: Left;    RADIOFREQUENCY ABLATION Right 3/7/2022    Procedure: RADIOFREQUENCY ABLATION RIGHT L3,4,5 1 of 2, needs consent;  Surgeon: Israel Hurtado MD;  Location: [facility] PAIN MGT;  Service: Pain Management;  Laterality: Right;    RADIOFREQUENCY ABLATION Left 3/21/2022    Procedure: RADIOFREQUENCY ABLATION RIGHT L3,4,5 2 of 2, needs consent;  Surgeon: Israel Hurtado MD;  Location: [facility] PAIN [facility]GT;  Service: Pain Management;  Laterality: Left;    RADIOFREQUENCY ABLATION OF LUMBAR MEDIAL BRANCH NERVE AT SINGLE LEVEL Left 6/26/2018    Procedure: RADIOFREQUENCY ABLATION, NERVE, MEDIAL BRANCH, LUMBAR, 1 LEVEL;  Surgeon: Lina Wagner MD;  Location: [facility];  Service: Pain Management;  Laterality: Left;  Left Cooled RFA  @ L2,3,4,5  [id number]  with Sedation    1 of 2    RADIOFREQUENCY ABLATION OF LUMBAR MEDIAL BRANCH NERVE AT SINGLE LEVEL Right 7/10/2018    Procedure: RADIOFREQUENCY ABLATION, NERVE, MEDIAL BRANCH, LUMBAR, 1 LEVEL;  Surgeon: Lina Wagner MD;  Location: [facility] PAIN MGT;  Service: Pain Management;  Laterality: Right;  RIght Cooled RFA @ L2,3,4,5  [id number] with Sedation    2 of 2    TRIGGER FINGER RELEASE Right 2017    TRIGGER FINGER RELEASE Left 7/29/2019    Procedure: RELEASE, TRIGGER FINGER, Left Thumb;  Surgeon: Velma Garcia MD;  Location: [facility] OR;  Service: Orthopedics;  Laterality: Left;  Local w/ MAC       Review of patient's allergies indicates:   Allergen Reactions    Strawberry Anaphylaxis       Current Outpatient Medications   Medication Sig Dispense Refill    albuterol (VENTOLIN HFA) 90 mcg/actuation inhaler INHALE TWO PUFFS INTO LUNGS EVERY 4 TO 6 HOURS AS NEEDED FOR SHORTNESS OF BREATH AND FOR WHEEZING 18 g 1    allopurinoL (ZYLOPRIM) 300 MG tablet Take 1 tablet (300 mg total) by mouth 2 (two) times daily. 180 tablet 3    atorvastatin (LIPITOR) 20 MG tablet Take 1 tablet (20 mg total) by mouth once daily. 90 tablet 3    b complex vitamins tablet Take 1 tablet by mouth every other day.       calcium citrate/vitamin D2 (ISIAH-CITRATE ORAL) Take 500 mg by mouth 2 (two) times daily.      colchicine (MITIGARE) 0.6 mg Cap Take 1 capsule (0.6 mg total) by mouth daily as needed (flare up). 60 capsule 3    cyanocobalamin (VITAMIN B-12) 1000 MCG tablet Take 100 mcg by mouth every morning.      diclofenac sodium (VOLTAREN) 1 % Gel Apply 2 g topically 4 (four) times daily as needed. To painful area on the feet. 500 g 5    FLUoxetine (PROZAC) 20 mg/5 mL (4 mg/mL) solution Take 5 mLs (20 mg total) by mouth once daily. 450 mL 3    fluticasone (FLONASE) 50 mcg/actuation nasal spray 1 spray by Each Nare route 2 (two) times daily as needed for Rhinitis. 15 g 0    indomethacin (INDOCIN) 50 MG  capsule Take 1 capsule (50 mg total) by mouth 2 (two) times daily with meals. 30 capsule 2    LIDOcaine (XYLOCAINE) 5 % Oint ointment Apply topically as needed.      MULTIVIT-IRON-MIN-FOLIC ACID 3,500-18-0.4 UNIT-MG-MG ORAL CHEW Take 1 tablet by mouth 2 (two) times daily.       nystatin (MYCOSTATIN) powder Apply topically 2 (two) times daily. 60 g 3    omeprazole (PRILOSEC) 20 MG capsule Take 1 capsule (20 mg total) by mouth every morning. 90 capsule 3    oxyCODONE-acetaminophen (PERCOCET)  mg per tablet Take 1 tablet by mouth every 8 (eight) hours as needed for Pain. 90 tablet 0    vitamin D 185 MG Tab Take 5,000 mg by mouth every morning.       oxybutynin (DITROPAN-XL) 5 MG TR24 Take 1 tablet (5 mg total) by mouth once daily. 30 tablet 11     No current facility-administered medications for this visit.     Facility-Administered Medications Ordered in Other Visits   Medication Dose Route Frequency Provider Last Rate Last Admin    0.9%  NaCl infusion   Intravenous Continuous Lina Wagner MD 25 mL/hr at 12/15/20 1506 25 mL/hr at 12/15/20 1506       Family History   Problem Relation Age of Onset    Diabetes Mother     Cataracts Mother     Diabetes Father     Cataracts Father     Coronary artery disease Brother     Diabetes Brother     Hypertension Sister     Diabetes Sister     No Known Problems Sister     Cancer Sister         lymphoma     Diabetes Brother     Hypotension Brother     Kidney failure Brother     Hypotension Brother     Amblyopia Neg Hx     Blindness Neg Hx     Glaucoma Neg Hx     Macular degeneration Neg Hx     Retinal detachment Neg Hx     Strabismus Neg Hx     Stroke Neg Hx     Thyroid disease Neg Hx        Social History     Socioeconomic History    Marital status:    Tobacco Use    Smoking status: Never Smoker    Smokeless tobacco: Never Used   Substance and Sexual Activity    Alcohol use: Yes     Comment: occasionally     Drug use: No     "Sexual activity: Yes     Partners: Female     Birth control/protection: None   Social History Narrative    Disabled. The patient is the youngest of 6 siblings. Single. Lives with single-sex partner.                        Review of Systems   Constitutional: Negative.   HENT: Negative.    Eyes: Negative.    Cardiovascular: Negative.    Respiratory: Negative.    Skin: Negative.    Musculoskeletal: Positive for joint pain.   Gastrointestinal: Negative.    Genitourinary: Negative.    Neurological: Negative.    Psychiatric/Behavioral: Negative.            Objective:   /84 (BP Location: Right arm, Patient Position: Sitting, BP Method: Medium (Automatic))   Pulse 60   Resp 17   Ht 5' 5" (1.651 m)   Wt 109.9 kg (242 lb 4.6 oz)   LMP 06/26/2018   BMI 40.32 kg/m²   Pain Disability Index Review:  Last 3 PDI Scores 4/19/2022 8/3/2021 6/7/2021   Pain Disability Index (PDI) 25 51 9     Normocephalic.  Atraumatic.  Affect appropriate.  Breathing unlabored.  Extra ocular muscles intact.           General    Constitutional: She is oriented to person, place, and time. She appears well-developed and well-nourished.   Pulmonary/Chest: Effort normal. No respiratory distress.   Neurological: She is alert and oriented to person, place, and time. She has normal reflexes.   Psychiatric: She has a normal mood and affect. Her behavior is normal.           Right Knee Exam     Tenderness   The patient is tender to palpation of the medial joint line, lateral joint line and patellar tendon.    Crepitus   The patient has crepitus of the patella.    Left Knee Exam     Tenderness   The patient tender to palpation of the medial joint line, lateral joint line and patellar tendon.    Crepitus   The patient has crepitus of the patella.        Assessment:       Encounter Diagnoses   Name Primary?    Primary osteoarthritis of both knees Yes    Failure of total knee replacement, subsequent encounter     Chronic pain of both knees     " Spondylosis of lumbar region without myelopathy or radiculopathy          Plan:   We discussed with the patient the assessment and recommendations. The following is the plan we agreed on:    - Will schedule for repeat genicular nerve RFA, right then left.   - Continue Percocet  mg as needed for pain. No refills needed.         Diagnoses and all orders for this visit:    Primary osteoarthritis of both knees  -     Procedure Order to Pain Management; Future  -     Procedure Order to Pain Management; Future    Failure of total knee replacement, subsequent encounter  -     Procedure Order to Pain Management; Future  -     Procedure Order to Pain Management; Future    Chronic pain of both knees  -     Procedure Order to Pain Management; Future  -     Procedure Order to Pain Management; Future    Spondylosis of lumbar region without myelopathy or radiculopathy        Ciro Chung MD  [facility], PGY-3       I have personally taken the history and examined this patient and agree with the resident's note as stated above.

## 2022-04-19 NOTE — H&P (VIEW-ONLY)
Subjective:      Patient ID: Alivia Thomas is a 56 y.o. female.    Chief Complaint: No chief complaint on file.    Referred by: No ref. provider found     The patient returns to clinic today for follow-up bilateral L3, 4, 5 RFA last month.  She reports that she is feeling very well following the procedure and reports 60-70% pain relief.  Today, she reports that her bilateral knee pain has continued to worsen, and she would like to go ahead and repeat bilateral genicular RFA as soon as possible.  She denies any new numbness, tingling, weakness, saddle anesthesia or bowel/bladder incontinence.        Past Medical History:   Diagnosis Date    Allergy     Anemia     Asthma     Bilateral shoulder bursitis     Cervical stenosis of spine     Chronic pain     DDD (degenerative disc disease), cervical     Depression 1/28/2019    Diabetes mellitus type II     DJD (degenerative joint disease) of knee 6/19/2014    Facet arthritis of lumbar region 12/17/2015    Facet syndrome 12/17/2015    GERD (gastroesophageal reflux disease)     Heartburn     Hyperlipidemia     Hypertension     Morbid obesity     Neuromuscular disorder     PADDY (obstructive sleep apnea)     Proteinuria     Right carpal tunnel syndrome     Sacroiliitis 6/13/2018    Sacroiliitis     Sleep apnea        Past Surgical History:   Procedure Laterality Date    CARPAL TUNNEL RELEASE      CARPAL TUNNEL RELEASE  1980s    left    CARPAL TUNNEL RELEASE  2012    right    COLONOSCOPY N/A 6/15/2020    Procedure: COLONOSCOPY;  Surgeon: Jc Koo MD;  Location: [facility] (4TH FLR);  Service: Endoscopy;  Laterality: N/A;  COVID screening on 6/13/20 at [facility]  pt updated on drop off location and no visitor [facility]-    ESOPHAGOGASTRODUODENOSCOPY N/A 3/27/2019    Procedure: EGD (ESOPHAGOGASTRODUODENOSCOPY);  Surgeon: Robbin Bar MD;  Location: [facility] (2ND FLR);  Service: Endoscopy;  Laterality: N/A;  BMI 61.3/2nd floor  case/svn    INJECTION OF JOINT Bilateral 6/9/2020    Procedure: INJECTION, JOINT, BILATERAL SI;  Surgeon: Lina Wagner MD;  Location: [facility]IN [facility]GT;  Service: Pain Management;  Laterality: Bilateral;  B/L SI Joint Injection    INJECTION OF JOINT Bilateral 5/11/2021    Procedure: INJECTION, JOINT, SACROILIAC (SI) need consent;  Surgeon: Lina Wagner MD;  Location: [facility];  Service: Pain Management;  Laterality: Bilateral;    JOINT REPLACEMENT Bilateral     with 2 revisions on rt    KNEE SURGERY  3/2010    orthroscope    KNEE SURGERY  6-19-14    left TKR    LAPAROSCOPIC SLEEVE GASTRECTOMY N/A 8/28/2019    Procedure: GASTRECTOMY, SLEEVE, LAPAROSCOPIC with intraop EGD;  Surgeon: Heriberto Clements MD;  [facility];  Service: General;  Laterality: N/A;    RADIOFREQUENCY ABLATION Right 7/9/2019    Procedure: RADIOFREQUENCY ABLATION;  Surgeon: Lina Wagner MD;  Location: [facility];  Service: Pain Management;  Laterality: Right;  RIGHT RFA L2,3,4,5  2 of 2    RADIOFREQUENCY ABLATION Left 12/19/2019    Procedure: RADIOFREQUENCY ABLATION, LEFT L2-L3-L4-L5 MEDIAL BRANCH 1 OF 2;  Surgeon: Lina Wagner MD;  Location: [facility];  Service: Pain Management;  Laterality: Left;    RADIOFREQUENCY ABLATION Right 12/31/2019    Procedure: RADIOFREQUENCY ABLATION, RIGHT L2-L3-L4-L5  2 OF 2;  Surgeon: Lina Wagner MD;  Location: [facility];  Service: Pain Management;  Laterality: Right;    RADIOFREQUENCY ABLATION Right 10/13/2020    Procedure: RADIOFREQUENCY ABLATION, Genicular Cooled 1 of 2;  Surgeon: Lina Wagner MD;  Location: [facility];  Service: Pain Management;  Laterality: Right;    RADIOFREQUENCY ABLATION Left 11/3/2020    Procedure: RADIOFREQUENCY ABLATION, GENICULAR COOLED  2 OF 2;  Surgeon: Lina Wagner MD;  Location: [facility];  Service: Pain Management;  Laterality: Left;    RADIOFREQUENCY ABLATION Left 12/15/2020    Procedure:  RADIOFREQUENCY ABLATION LEFT L2,3,4,5 1 of 2;  Surgeon: Lina Wagner MD;  Location: [facility];  Service: Pain Management;  Laterality: Left;    RADIOFREQUENCY ABLATION Right 12/29/2020    Procedure: RADIOFREQUENCY ABLATION RIGHT L2,3,4,5 2 of 2;  Surgeon: Lina Wagner MD;  Location: [facility];  Service: Pain Management;  Laterality: Right;    RADIOFREQUENCY ABLATION Left 6/29/2021    Procedure: RADIOFREQUENCY ABLATION GENICULAR COOLED;  Surgeon: Lina Wagner MD;  Location: [facility];  Service: Pain Management;  Laterality: Left;  1 of 2    RADIOFREQUENCY ABLATION Right 7/13/2021    Procedure: RADIOFREQUENCY ABLATION GENICULAR;  Surgeon: Lina Wagner MD;  Location: [facility];  Service: Pain Management;  Laterality: Right;  2 of 2    RADIOFREQUENCY ABLATION Right 8/24/2021    Procedure: RADIOFREQUENCY ABLATION, L2-L3-L4-L5 MEDIAL BRANCH 1 OF 2;  Surgeon: Lina Wagner MD;  Location: [facility];  Service: Pain Management;  Laterality: Right;    RADIOFREQUENCY ABLATION Left 9/11/2021    Procedure: RADIOFREQUENCY ABLATION, L2-L3-L4-L5 MEDIAL BR ANCH 2 OF 2;  Surgeon: Lina Wagner MD;  Location: [facility];  Service: Pain Management;  Laterality: Left;    RADIOFREQUENCY ABLATION Right 3/7/2022    Procedure: RADIOFREQUENCY ABLATION RIGHT L3,4,5 1 of 2, needs consent;  Surgeon: Israel Hurtado MD;  Location: [facility];  Service: Pain Management;  Laterality: Right;    RADIOFREQUENCY ABLATION Left 3/21/2022    Procedure: RADIOFREQUENCY ABLATION RIGHT L3,4,5 2 of 2, needs consent;  Surgeon: Israel Hurtado MD;  Location: [facility];  Service: Pain Management;  Laterality: Left;    RADIOFREQUENCY ABLATION OF LUMBAR MEDIAL BRANCH NERVE AT SINGLE LEVEL Left 6/26/2018    Procedure: RADIOFREQUENCY ABLATION, NERVE, MEDIAL BRANCH, LUMBAR, 1 LEVEL;  Surgeon: Lina Wagner MD;  Location: [facility];  Service: Pain Management;  Laterality: Left;  Left Cooled RFA  @ L2,3,4,5  [id number]  with Sedation    1 of 2    RADIOFREQUENCY ABLATION OF LUMBAR MEDIAL BRANCH NERVE AT SINGLE LEVEL Right 7/10/2018    Procedure: RADIOFREQUENCY ABLATION, NERVE, MEDIAL BRANCH, LUMBAR, 1 LEVEL;  Surgeon: Lina Wagner MD;  Location: [facility]IN [facility]GT;  Service: Pain Management;  Laterality: Right;  RIght Cooled RFA @ L2,3,4,5  90653-62053 with Sedation    2 of 2    TRIGGER FINGER RELEASE Right 2017    TRIGGER FINGER RELEASE Left 7/29/2019    Procedure: RELEASE, TRIGGER FINGER, Left Thumb;  Surgeon: Velma Garcia MD;  Location: [facility] OR;  Service: Orthopedics;  Laterality: Left;  Local w/ MAC       Review of patient's allergies indicates:   Allergen Reactions    Strawberry Anaphylaxis       Current Outpatient Medications   Medication Sig Dispense Refill    albuterol (VENTOLIN HFA) 90 mcg/actuation inhaler INHALE TWO PUFFS INTO LUNGS EVERY 4 TO 6 HOURS AS NEEDED FOR SHORTNESS OF BREATH AND FOR WHEEZING 18 g 1    allopurinoL (ZYLOPRIM) 300 MG tablet Take 1 tablet (300 mg total) by mouth 2 (two) times daily. 180 tablet 3    atorvastatin (LIPITOR) 20 MG tablet Take 1 tablet (20 mg total) by mouth once daily. 90 tablet 3    b complex vitamins tablet Take 1 tablet by mouth every other day.       calcium citrate/vitamin D2 (ISIAH-CITRATE ORAL) Take 500 mg by mouth 2 (two) times daily.      colchicine (MITIGARE) 0.6 mg Cap Take 1 capsule (0.6 mg total) by mouth daily as needed (flare up). 60 capsule 3    cyanocobalamin (VITAMIN B-12) 1000 MCG tablet Take 100 mcg by mouth every morning.      diclofenac sodium (VOLTAREN) 1 % Gel Apply 2 g topically 4 (four) times daily as needed. To painful area on the feet. 500 g 5    FLUoxetine (PROZAC) 20 mg/5 mL (4 mg/mL) solution Take 5 mLs (20 mg total) by mouth once daily. 450 mL 3    fluticasone (FLONASE) 50 mcg/actuation nasal spray 1 spray by Each Nare route 2 (two) times daily as needed for Rhinitis. 15 g 0    indomethacin (INDOCIN) 50 MG  capsule Take 1 capsule (50 mg total) by mouth 2 (two) times daily with meals. 30 capsule 2    LIDOcaine (XYLOCAINE) 5 % Oint ointment Apply topically as needed.      MULTIVIT-IRON-MIN-FOLIC ACID 3,500-18-0.4 UNIT-MG-MG ORAL CHEW Take 1 tablet by mouth 2 (two) times daily.       nystatin (MYCOSTATIN) powder Apply topically 2 (two) times daily. 60 g 3    omeprazole (PRILOSEC) 20 MG capsule Take 1 capsule (20 mg total) by mouth every morning. 90 capsule 3    oxyCODONE-acetaminophen (PERCOCET)  mg per tablet Take 1 tablet by mouth every 8 (eight) hours as needed for Pain. 90 tablet 0    vitamin D 185 MG Tab Take 5,000 mg by mouth every morning.       oxybutynin (DITROPAN-XL) 5 MG TR24 Take 1 tablet (5 mg total) by mouth once daily. 30 tablet 11     No current facility-administered medications for this visit.     Facility-Administered Medications Ordered in Other Visits   Medication Dose Route Frequency Provider Last Rate Last Admin    0.9%  NaCl infusion   Intravenous Continuous Lina Wagner MD 25 mL/hr at 12/15/20 1506 25 mL/hr at 12/15/20 1506       Family History   Problem Relation Age of Onset    Diabetes Mother     Cataracts Mother     Diabetes Father     Cataracts Father     Coronary artery disease Brother     Diabetes Brother     Hypertension Sister     Diabetes Sister     No Known Problems Sister     Cancer Sister         lymphoma     Diabetes Brother     Hypotension Brother     Kidney failure Brother     Hypotension Brother     Amblyopia Neg Hx     Blindness Neg Hx     Glaucoma Neg Hx     Macular degeneration Neg Hx     Retinal detachment Neg Hx     Strabismus Neg Hx     Stroke Neg Hx     Thyroid disease Neg Hx        Social History     Socioeconomic History    Marital status:    Tobacco Use    Smoking status: Never Smoker    Smokeless tobacco: Never Used   Substance and Sexual Activity    Alcohol use: Yes     Comment: occasionally     Drug use: No     "Sexual activity: Yes     Partners: Female     Birth control/protection: None   Social History Narrative    Disabled. The patient is the youngest of 6 siblings. Single. Lives with single-sex partner.                        Review of Systems   Constitutional: Negative.   HENT: Negative.    Eyes: Negative.    Cardiovascular: Negative.    Respiratory: Negative.    Skin: Negative.    Musculoskeletal: Positive for joint pain.   Gastrointestinal: Negative.    Genitourinary: Negative.    Neurological: Negative.    Psychiatric/Behavioral: Negative.            Objective:   /84 (BP Location: Right arm, Patient Position: Sitting, BP Method: Medium (Automatic))   Pulse 60   Resp 17   Ht 5' 5" (1.651 m)   Wt 109.9 kg (242 lb 4.6 oz)   LMP 06/26/2018   BMI 40.32 kg/m²   Pain Disability Index Review:  Last 3 PDI Scores 4/19/2022 8/3/2021 6/7/2021   Pain Disability Index (PDI) 25 51 9     Normocephalic.  Atraumatic.  Affect appropriate.  Breathing unlabored.  Extra ocular muscles intact.           General    Constitutional: She is oriented to person, place, and time. She appears well-developed and well-nourished.   Pulmonary/Chest: Effort normal. No respiratory distress.   Neurological: She is alert and oriented to person, place, and time. She has normal reflexes.   Psychiatric: She has a normal mood and affect. Her behavior is normal.           Right Knee Exam     Tenderness   The patient is tender to palpation of the medial joint line, lateral joint line and patellar tendon.    Crepitus   The patient has crepitus of the patella.    Left Knee Exam     Tenderness   The patient tender to palpation of the medial joint line, lateral joint line and patellar tendon.    Crepitus   The patient has crepitus of the patella.        Assessment:       Encounter Diagnoses   Name Primary?    Primary osteoarthritis of both knees Yes    Failure of total knee replacement, subsequent encounter     Chronic pain of both knees     " Spondylosis of lumbar region without myelopathy or radiculopathy          Plan:   We discussed with the patient the assessment and recommendations. The following is the plan we agreed on:    - Will schedule for repeat genicular nerve RFA, right then left.   - Continue Percocet  mg as needed for pain. No refills needed.         Diagnoses and all orders for this visit:    Primary osteoarthritis of both knees  -     Procedure Order to Pain Management; Future  -     Procedure Order to Pain Management; Future    Failure of total knee replacement, subsequent encounter  -     Procedure Order to Pain Management; Future  -     Procedure Order to Pain Management; Future    Chronic pain of both knees  -     Procedure Order to Pain Management; Future  -     Procedure Order to Pain Management; Future    Spondylosis of lumbar region without myelopathy or radiculopathy        Ciro Chung MD  [facility], PGY-3       I have personally taken the history and examined this patient and agree with the resident's note as stated above.

## 2022-05-05 ENCOUNTER — PATIENT MESSAGE (OUTPATIENT)
Dept: BARIATRICS | Facility: CLINIC | Age: 57
End: 2022-05-05
Payer: MEDICARE

## 2022-05-06 ENCOUNTER — PATIENT MESSAGE (OUTPATIENT)
Dept: PAIN MEDICINE | Facility: OTHER | Age: 57
End: 2022-05-06
Payer: MEDICARE

## 2022-05-09 ENCOUNTER — HOSPITAL ENCOUNTER (OUTPATIENT)
Facility: OTHER | Age: 57
Discharge: HOME | End: 2022-05-09
Attending: ANESTHESIOLOGY | Admitting: ANESTHESIOLOGY
Payer: MEDICARE

## 2022-05-09 ENCOUNTER — OFFICE VISIT (OUTPATIENT)
Dept: INTERNAL MEDICINE | Facility: CLINIC | Age: 57
End: 2022-05-09
Payer: MEDICARE

## 2022-05-09 VITALS
WEIGHT: 242.75 LBS | BODY MASS INDEX: 40.44 KG/M2 | SYSTOLIC BLOOD PRESSURE: 142 MMHG | HEART RATE: 69 BPM | TEMPERATURE: 98 F | DIASTOLIC BLOOD PRESSURE: 84 MMHG | HEIGHT: 65 IN | OXYGEN SATURATION: 97 %

## 2022-05-09 VITALS
OXYGEN SATURATION: 97 % | TEMPERATURE: 98 F | RESPIRATION RATE: 16 BRPM | HEART RATE: 56 BPM | SYSTOLIC BLOOD PRESSURE: 118 MMHG | DIASTOLIC BLOOD PRESSURE: 58 MMHG

## 2022-05-09 DIAGNOSIS — G89.29 CHRONIC PAIN OF BOTH KNEES: ICD-10-CM

## 2022-05-09 DIAGNOSIS — G89.29 CHRONIC PAIN: ICD-10-CM

## 2022-05-09 DIAGNOSIS — M25.562 CHRONIC PAIN OF BOTH KNEES: ICD-10-CM

## 2022-05-09 DIAGNOSIS — E11.65 UNCONTROLLED TYPE 2 DIABETES MELLITUS WITH HYPERGLYCEMIA: Primary | ICD-10-CM

## 2022-05-09 DIAGNOSIS — M10.9 GOUT, UNSPECIFIED CAUSE, UNSPECIFIED CHRONICITY, UNSPECIFIED SITE: ICD-10-CM

## 2022-05-09 DIAGNOSIS — N39.46 MIXED INCONTINENCE URGE AND STRESS (MALE)(FEMALE): ICD-10-CM

## 2022-05-09 DIAGNOSIS — M17.9 OSTEOARTHRITIS OF KNEE, UNSPECIFIED LATERALITY, UNSPECIFIED OSTEOARTHRITIS TYPE: ICD-10-CM

## 2022-05-09 DIAGNOSIS — M17.0 PRIMARY OSTEOARTHRITIS OF BOTH KNEES: Primary | ICD-10-CM

## 2022-05-09 DIAGNOSIS — M25.561 CHRONIC PAIN OF BOTH KNEES: ICD-10-CM

## 2022-05-09 DIAGNOSIS — Z12.31 SCREENING MAMMOGRAM, ENCOUNTER FOR: ICD-10-CM

## 2022-05-09 PROCEDURE — 99999 PR PBB SHADOW E&M-EST. PATIENT-LVL IV: CPT | Mod: PBBFAC,,, | Performed by: INTERNAL MEDICINE

## 2022-05-09 PROCEDURE — 64624 PR DESTRUCT, NEUROLYTIC AGENT, GENICULAR NERVE, W/IMG: ICD-10-PCS | Mod: RT,,, | Performed by: ANESTHESIOLOGY

## 2022-05-09 PROCEDURE — 25000003 PHARM REV CODE 250: Performed by: STUDENT IN AN ORGANIZED HEALTH CARE EDUCATION/TRAINING PROGRAM

## 2022-05-09 PROCEDURE — 99999 PR PBB SHADOW E&M-EST. PATIENT-LVL IV: ICD-10-PCS | Mod: PBBFAC,,, | Performed by: INTERNAL MEDICINE

## 2022-05-09 PROCEDURE — 82947 ASSAY GLUCOSE BLOOD QUANT: CPT | Mod: 91 | Performed by: ANESTHESIOLOGY

## 2022-05-09 PROCEDURE — 64624 DSTRJ NULYT AGT GNCLR NRV: CPT | Mod: RT,,, | Performed by: ANESTHESIOLOGY

## 2022-05-09 PROCEDURE — 99214 PR OFFICE/OUTPT VISIT, EST, LEVL IV, 30-39 MIN: ICD-10-PCS | Mod: S$PBB,,, | Performed by: INTERNAL MEDICINE

## 2022-05-09 PROCEDURE — 64624 DSTRJ NULYT AGT GNCLR NRV: CPT | Mod: RT | Performed by: ANESTHESIOLOGY

## 2022-05-09 PROCEDURE — 99214 OFFICE O/P EST MOD 30 MIN: CPT | Mod: PBBFAC | Performed by: INTERNAL MEDICINE

## 2022-05-09 PROCEDURE — 63600175 PHARM REV CODE 636 W HCPCS: Performed by: ANESTHESIOLOGY

## 2022-05-09 PROCEDURE — A4649 SURGICAL SUPPLIES: HCPCS | Performed by: ANESTHESIOLOGY

## 2022-05-09 PROCEDURE — 25000003 PHARM REV CODE 250: Performed by: ANESTHESIOLOGY

## 2022-05-09 PROCEDURE — 99214 OFFICE O/P EST MOD 30 MIN: CPT | Mod: S$PBB,,, | Performed by: INTERNAL MEDICINE

## 2022-05-09 RX ORDER — SODIUM CHLORIDE 9 MG/ML
500 INJECTION, SOLUTION INTRAVENOUS CONTINUOUS
Status: ACTIVE | OUTPATIENT
Start: 2022-05-09 | End: 2022-05-10

## 2022-05-09 RX ORDER — FLUTICASONE PROPIONATE 50 MCG
1 SPRAY, SUSPENSION (ML) NASAL 2 TIMES DAILY PRN
Qty: 15 G | Refills: 6 | Status: SHIPPED | OUTPATIENT
Start: 2022-05-09 | End: 2023-05-08

## 2022-05-09 RX ORDER — OXYBUTYNIN CHLORIDE 5 MG/1
5 TABLET, EXTENDED RELEASE ORAL DAILY
Qty: 90 TABLET | Refills: 3 | Status: SHIPPED | OUTPATIENT
Start: 2022-05-09 | End: 2023-03-13 | Stop reason: SDUPTHER

## 2022-05-09 RX ORDER — TRIAMCINOLONE ACETONIDE 40 MG/ML
INJECTION, SUSPENSION INTRA-ARTICULAR; INTRAMUSCULAR
Status: DISCONTINUED | OUTPATIENT
Start: 2022-05-09 | End: 2022-05-09 | Stop reason: HOSPADM

## 2022-05-09 RX ORDER — FLUOXETINE 20 MG/5ML
20 SOLUTION ORAL DAILY
Qty: 450 ML | Refills: 3 | Status: SHIPPED | OUTPATIENT
Start: 2022-05-09 | End: 2023-03-13 | Stop reason: SDUPTHER

## 2022-05-09 RX ORDER — LIDOCAINE HYDROCHLORIDE 20 MG/ML
INJECTION, SOLUTION INFILTRATION; PERINEURAL
Status: DISCONTINUED | OUTPATIENT
Start: 2022-05-09 | End: 2022-05-09 | Stop reason: HOSPADM

## 2022-05-09 RX ORDER — MIDAZOLAM HYDROCHLORIDE 1 MG/ML
INJECTION INTRAMUSCULAR; INTRAVENOUS
Status: DISCONTINUED | OUTPATIENT
Start: 2022-05-09 | End: 2022-05-09 | Stop reason: HOSPADM

## 2022-05-09 RX ORDER — COLCHICINE 0.6 MG/1
0.6 CAPSULE ORAL DAILY PRN
Qty: 60 CAPSULE | Refills: 3 | Status: SHIPPED | OUTPATIENT
Start: 2022-05-09 | End: 2023-03-13 | Stop reason: SDUPTHER

## 2022-05-09 RX ORDER — BUPIVACAINE HYDROCHLORIDE 2.5 MG/ML
INJECTION, SOLUTION EPIDURAL; INFILTRATION; INTRACAUDAL
Status: DISCONTINUED | OUTPATIENT
Start: 2022-05-09 | End: 2022-05-09 | Stop reason: HOSPADM

## 2022-05-09 RX ORDER — FENTANYL CITRATE 50 UG/ML
INJECTION, SOLUTION INTRAMUSCULAR; INTRAVENOUS
Status: DISCONTINUED | OUTPATIENT
Start: 2022-05-09 | End: 2022-05-09 | Stop reason: HOSPADM

## 2022-05-09 NOTE — PROGRESS NOTES
Subjective:       Patient ID: Alivia Thomas is a 56 y.o. female.    Chief Complaint: Pre-op Exam    HPIPt is feeling well and plans plastic surgery with Dr. Gray after weight loss surgery.  No CP or SOB.Pt denies any problems with anesthesia in the past. Pt reports no history of bleeding diathesis, no significant neck DJD, and no steroids in the last year.  Review of Systems   Constitutional: Negative for activity change and unexpected weight change.   HENT: Negative for hearing loss, rhinorrhea and trouble swallowing.    Eyes: Negative for discharge and visual disturbance.   Respiratory: Negative for chest tightness, shortness of breath and wheezing.    Cardiovascular: Negative for chest pain and palpitations.   Gastrointestinal: Negative for blood in stool, constipation, diarrhea and vomiting.   Endocrine: Negative for polydipsia and polyuria.   Genitourinary: Negative for difficulty urinating, dysuria, hematuria and menstrual problem.   Musculoskeletal: Positive for arthralgias. Negative for joint swelling and neck pain.   Neurological: Negative for weakness and headaches.   Psychiatric/Behavioral: Negative for confusion and dysphoric mood.       Objective:      Physical Exam  Constitutional:       General: She is not in acute distress.     Appearance: She is well-developed.   HENT:      Head: Normocephalic.   Neck:      Thyroid: No thyromegaly.      Vascular: No JVD.   Cardiovascular:      Rate and Rhythm: Normal rate and regular rhythm.      Heart sounds: Normal heart sounds. No murmur heard.    No friction rub. No gallop.   Pulmonary:      Effort: Pulmonary effort is normal.      Breath sounds: Normal breath sounds. No wheezing or rales.   Abdominal:      General: Bowel sounds are normal. There is no distension.      Palpations: Abdomen is soft. There is no mass.      Tenderness: There is no abdominal tenderness. There is no guarding or rebound.   Musculoskeletal:      Cervical back: Neck supple.    Lymphadenopathy:      Cervical: No cervical adenopathy.   Skin:     General: Skin is warm and dry.   Neurological:      Mental Status: She is alert and oriented to person, place, and time.      Deep Tendon Reflexes: Reflexes are normal and symmetric.   Psychiatric:         Behavior: Behavior normal.         Thought Content: Thought content normal.         Judgment: Judgment normal.         Assessment:       1. Uncontrolled type 2 diabetes mellitus with hyperglycemia    2. Gout, unspecified cause, unspecified chronicity, unspecified site    3. Mixed incontinence urge and stress (male)(female)    4. Screening mammogram, encounter for        Plan:   Uncontrolled type 2 diabetes mellitus with hyperglycemia  -     CBC Auto Differential; Future; Expected date: 05/09/2022  -     Comprehensive Metabolic Panel; Future; Expected date: 05/09/2022  -     TSH; Future; Expected date: 05/09/2022  -     Hemoglobin A1C; Future; Expected date: 05/09/2022    Gout, unspecified cause, unspecified chronicity, unspecified site  -     Uric Acid; Future; Expected date: 05/09/2022  -     colchicine (MITIGARE) 0.6 mg Cap; Take 1 capsule (0.6 mg total) by mouth daily as needed (flare up).  Dispense: 60 capsule; Refill: 3    Mixed incontinence urge and stress (male)(female)  -     oxybutynin (DITROPAN-XL) 5 MG TR24; Take 1 tablet (5 mg total) by mouth once daily.  Dispense: 90 tablet; Refill: 3    Screening mammogram, encounter for  -     Mammo Digital Screening Bilat w/ [place]; Future; Expected date: 11/09/2022    Other orders  -     fluticasone propionate (FLONASE) 50 mcg/actuation nasal spray; 1 spray (50 mcg total) by Each Nostril route 2 (two) times daily as needed for Rhinitis.  Dispense: 15 g; Refill: 6  -     FLUoxetine (PROZAC) 20 mg/5 mL (4 mg/mL) solution; Take 5 mLs (20 mg total) by mouth once daily.  Dispense: 450 mL; Refill: 3      EKG - NSR - no change from prior  CXR - 11/2021 - WNL  Await lab for final clearance.

## 2022-05-09 NOTE — DISCHARGE INSTRUCTIONS

## 2022-05-09 NOTE — OP NOTE
Therapeutic Genicular Cooled Nerve Radiofrequency Ablation under Fluoroscopy     The procedure, risks, benefits, and options were discussed with the patient. There are no contraindications to the procedure. The patent expressed understanding and agreed to the procedure. Informed written consent was obtained prior to the start of the procedure and can be found in the patient's chart.        PATIENT NAME: Alivia Thomas   MRN: [id number]     DATE OF PROCEDURE: 05/09/2022     PROCEDURE: Therapeutic Right Genicular Cooled Nerve Radiofrequency Ablation under Fluoroscopy    PRE-OP DIAGNOSIS: Primary osteoarthritis of both knees [M17.0]  Failure of total knee replacement, subsequent encounter [T84.018D, Z96.659]  Chronic pain of both knees [M25.561, M25.562, G89.29]    POST-OP DIAGNOSIS: Primary osteoarthritis of both knees [M17.0]  Failure of total knee replacement, subsequent encounter [T84.018D, Z96.659]  Chronic pain of both knees [M25.561, M25.562, G89.29]    PHYSICIAN: Israel Hurtado MD    ASSISTANTS: Les Morgan MD   [facility], PGY-2/CA-1      MEDICATIONS INJECTED:  Preservative-free Kenalog 40mg with 9cc of Bupivicaine 0.25%    LOCAL ANESTHETIC INJECTED:   Xylocaine 2%    SEDATION: Versed 2mg and Fentanyl 50mcg                                                                                                                                                                                     Conscious sedation ordered by M.D. Patient re-evaluation prior to administration of conscious sedation. No changes noted in patient's status from initial evaluation. The patient's vital signs were monitored by RN and patient remained hemodynamically stable throughout the procedure.  No case tracking events are documented in the log.    ESTIMATED BLOOD LOSS:  None    COMPLICATIONS:  None     INTERVAL HISTORY: Patient has clinical findings of chronic knee pain. Patients has completed 2 previous diagnostic genicular nerve  blocks with at least 80% relief for the expected duration of the local anesthetic utilized.     TECHNIQUE: Time-out was performed to identify the patient and procedure to be performed. With the patient laying in a supine position, the surgical area was prepped and draped in the usual sterile fashion using ChloraPrep and fenestrated drape. Three target sites including the superior lateral genicular nerve where the lateral femoral shaft meets the epicondyle, the superior medial genicular nerve where the medial femoral shaft meets the epicondyle, and the inferior medial genicular nerve where the medial tibial shaft meets the epicondyle, were determined under fluoroscopic guidance. Skin anesthesia was achieved by injecting Lidocaine 2% over the injection sites. A 20 gauge, 50mm, 10mm active tip needle was then advanced under fluoroscopy in the AP and lateral views into the positions of the geniculate nerves at these levels. This was followed by motor testing at each of the nerves to ensure that there was no dorsiflexion of the foot. After negative aspiration for blood was confirmed, 1 mL of the lidocaine 2% listed above was injected slowly at each site. This was followed by cooled thermal lesioning at 80 degrees celsius for 150 seconds at each site. That was followed by slowly injecting 3 mL of the medication mixture listed above at each site. The needles were removed and bleeding was nil. A sterile dressing was applied. No specimens collected. The patient tolerated the procedure well and did not have any procedure related motor deficit at the conclusion of the procedure.    The patient was monitored after the procedure in the recovery area. They were given post-procedure and discharge instructions to follow at home. The patient was discharged in a stable condition.    PAIN BEFORE THE PROCEDURE: 10/10    PAIN AFTER THE PROCEDURE: 7/10    Israel Hurtado MD

## 2022-05-09 NOTE — BRIEF OP NOTE
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, Z96.659]  Chronic pain of both knees [M25.561, M25.562, G89.29]    Disposition: Home or self care    Patient Instructions:   Current Discharge Medication List      CONTINUE these medications which have CHANGED    Details   indomethacin (INDOCIN) 50 MG capsule TAKE 1 CAPSULE BY MOUTH 2 TIMES DAILY WITH MEALS  Qty: 30 capsule, Refills: 2    Associated Diagnoses: Right foot pain; Enthesopathy of foot         CONTINUE these medications which have NOT CHANGED    Details   albuterol (VENTOLIN HFA) 90 mcg/actuation inhaler INHALE TWO PUFFS INTO LUNGS EVERY 4 TO 6 HOURS AS NEEDED FOR SHORTNESS OF BREATH AND FOR WHEEZING  Qty: 18 g, Refills: 1    Comments: Please consider 90 day supplies to promote better adherence  Associated Diagnoses: Asthma, well controlled, mild intermittent      allopurinoL (ZYLOPRIM) 300 MG tablet Take 1 tablet (300 mg total) by mouth 2 (two) times daily.  Qty: 180 tablet, Refills: 3    Associated Diagnoses: Gout, unspecified cause, unspecified chronicity, unspecified site      atorvastatin (LIPITOR) 20 MG tablet Take 1 tablet (20 mg total) by mouth once daily.  Qty: 90 tablet, Refills: 3      b complex vitamins tablet Take 1 tablet by mouth every other day.       calcium citrate/vitamin D2 (ISIAH-CITRATE ORAL) Take 500 mg by mouth 2 (two) times daily.      colchicine (MITIGARE) 0.6 mg Cap Take 1 capsule (0.6 mg total) by mouth daily as needed (flare up).  Qty: 60 capsule, Refills: 3    Associated Diagnoses: Gout, unspecified cause, unspecified chronicity, unspecified site      cyanocobalamin (VITAMIN B-12) 1000  MCG tablet Take 100 mcg by mouth every morning.      diclofenac sodium (VOLTAREN) 1 % Gel Apply 2 g topically 4 (four) times daily as needed. To painful area on the feet.  Qty: 500 g, Refills: 5    Comments: 5 x 100g tubes  Associated Diagnoses: Right foot pain; Enthesopathy of foot      FLUoxetine (PROZAC) 20 mg/5 mL (4 mg/mL) solution Take 5 mLs (20 mg total) by mouth once daily.  Qty: 450 mL, Refills: 3      fluticasone propionate (FLONASE) 50 mcg/actuation nasal spray 1 spray (50 mcg total) by Each Nostril route 2 (two) times daily as needed for Rhinitis.  Qty: 15 g, Refills: 6      LIDOcaine (XYLOCAINE) 5 % Oint ointment Apply topically as needed.      MULTIVIT-IRON-MIN-FOLIC ACID 3,500-18-0.4 UNIT-MG-MG ORAL CHEW Take 1 tablet by mouth 2 (two) times daily.       nystatin (MYCOSTATIN) powder Apply topically 2 (two) times daily.  Qty: 60 g, Refills: 3      omeprazole (PRILOSEC) 20 MG capsule Take 1 capsule (20 mg total) by mouth every morning.  Qty: 90 capsule, Refills: 3    Associated Diagnoses: Gastroesophageal reflux disease without esophagitis      oxybutynin (DITROPAN-XL) 5 MG TR24 Take 1 tablet (5 mg total) by mouth once daily.  Qty: 90 tablet, Refills: 3    Associated Diagnoses: Mixed incontinence urge and stress (male)(female)      oxyCODONE-acetaminophen (PERCOCET)  mg per tablet Take 1 tablet by mouth every 8 (eight) hours as needed for Pain.  Qty: 90 tablet, Refills: 0    Comments: Quantity prescribed more than 7 day supply? Yes, quantity medically necessary  Associated Diagnoses: Lumbar spondylosis; Chronic pain syndrome      vitamin D 185 MG Tab Take 5,000 mg by mouth every morning.                  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  Activity: as per instruction sheet.  Discharge to: Home with a responsible adult.  Follow up: 2-4 weeks       Please call my office or pager at [phone] if experienced any weakness or loss of sensation, fever > 101.5, pain uncontrolled with oral medications, persistent nausea/vomiting/or diarrhea, redness or drainage from the incisions, or any other worrisome concerns. If physician on call was not reached or could not communicate with our office for any reason please go to the nearest emergency department

## 2022-05-09 NOTE — PROGRESS NOTES
Answers for HPI/ROS submitted by the patient on 5/5/2022  activity change: No  unexpected weight change: No  neck pain: No  hearing loss: No  rhinorrhea: No  trouble swallowing: No  eye discharge: No  visual disturbance: No  chest tightness: No  wheezing: No  chest pain: No  palpitations: No  blood in stool: No  constipation: No  vomiting: No  diarrhea: No  polydipsia: No  polyuria: No  difficulty urinating: No  hematuria: No  menstrual problem: No  dysuria: No  joint swelling: No  arthralgias: Yes  headaches: No  weakness: No  confusion: No  dysphoric mood: No

## 2022-05-10 ENCOUNTER — PATIENT MESSAGE (OUTPATIENT)
Dept: BARIATRICS | Facility: CLINIC | Age: 57
End: 2022-05-10
Payer: MEDICARE

## 2022-05-10 DIAGNOSIS — G89.4 CHRONIC PAIN SYNDROME: ICD-10-CM

## 2022-05-10 DIAGNOSIS — M47.816 LUMBAR SPONDYLOSIS: ICD-10-CM

## 2022-05-10 LAB — POCT GLUCOSE: 100 MG/DL (ref 70–110)

## 2022-05-10 RX ORDER — OXYCODONE AND ACETAMINOPHEN 10; 325 MG/1; MG/1
1 TABLET ORAL EVERY 8 HOURS PRN
Qty: 90 TABLET | Refills: 0 | Status: SHIPPED | OUTPATIENT
Start: 2022-05-11 | End: 2022-06-07 | Stop reason: SDUPTHER

## 2022-05-10 NOTE — TELEPHONE ENCOUNTER
Patient requesting refill on Percocet 10/325mg  Last office visit 04.19.22   shows last refill on 04.12.22  Patient does have a pain contract on file with [facility]  Patient last UDS 06.07.21 was consistent with current therapy    CODEINE  Not Detected   Not Detected   Not Detected  Not Detected     MORPHINE  Not Detected   Not Detected   Not Detected  Not Detected     6-ACETYLMORPHINE  Not Detected   Not Detected   Not Detected  Not Detected     OXYCODONE  Present   Not Detected   Not Detected  Present     NOROYXCODONE  Present   Present   Present  Present     OXYMORPHONE  Present   Not Detected   Not Detected  Not Detected     NOROXYMORPHONE  Not Detected   Not Detected   Not Detected  Not Detected     HYDROCODONE  Not Detected   Not Detected   Not Detected  Not Detected     NORHYDROCODONE  Not Detected   Not Detected   Not Detected  Not Detected     HYDROMORPHONE  Not Detected   Not Detected   Not Detected  Not Detected     BUPRENORPHINE  Not Detected   Not Detected   Not Detected  Not Detected     NORUBPRENORPHINE  Not Detected   Not Detected   Not Detected  Not Detected     FENTANYL  Not Detected   Not Detected   Not Detected  Not Detected     NORFENTANYL  Not Detected   Not Detected   Not Detected  Not Detected     MEPERIDINE METABOLITE  Not Detected   Not Detected   Not Detected  Not Detected     TAPENTADOL  Not Detected   Not Detected   Not Detected  Not Detected     TAPENTADOL-O-SULF  Not Detected   Not Detected   Not Detected  Not Detected     METHADONE  Not Detected   Not Detected   Not Detected  Not Detected     TRAMADOL  Not Detected   Not Detected   Not Detected  Not Detected     AMPHETAMINE  Not Detected   Not Detected   Not Detected  Not Detected     METHAMPHETAMINE  Not Detected   Not Detected   Not Detected  Not Detected     MDMA- ECSTASY  Not Detected   Not Detected   Not Detected  Not Detected     MDA  Not Detected   Not Detected   Not Detected  Not Detected      MDEA- Kait  Not Detected   Not Detected   Not Detected  Not Detected     METHYLPHENIDATE  Not Detected   Not Detected   Not Detected  Not Detected     PHENTERMINE  Not Detected   Not Detected   Not Detected  Not Detected     BENZOYLECGONINE  Not Detected   Not Detected   Not Detected  Not Detected     ALPRAZOLAM  Not Detected   Not Detected   Not Detected  Not Detected     ALPHA-OH-ALPRAZOLAM  Not Detected   Not Detected   Not Detected  Not Detected     CLONAZEPAM  Not Detected   Not Detected   Not Detected  Not Detected     7-AMINOCLONAZEPAM  Not Detected   Not Detected   Not Detected  Not Detected     DIAZEPAM  Not Detected   Not Detected   Not Detected  Not Detected     NORDIAZEPAM  Not Detected   Not Detected   Not Detected  Not Detected     OXAZEPAM  Not Detected   Not Detected   Not Detected  Not Detected     TEMAZEPAM  Not Detected   Not Detected   Not Detected  Not Detected     Lorazepam  Not Detected   Not Detected   Not Detected  Not Detected     MIDAZOLAM  Not Detected   Not Detected   Not Detected  Not Detected     ZOLPIDEM  Not Detected   Not Detected   Not Detected  Not Detected     BARBITURATES  Not Detected   Not Detected   Not Detected  Not Detected     Creatinine, Urine 20.0 - 400.0 mg/dL 93.0  86.0 R, CM  66.1  113.0 R, CM  91.8  163.4  301.0 R, CM    ETHYL GLUCURONIDE  Not Detected   Not Detected   Not Detected  Not Detected     MARIJUANA METABOLITE  Not Detected   Not Detected   Not Detected  Not Detected     PCP  Not Detected   Not Detected   Not Detected  Not Detected     CARISOPRODOL  Not Detected   Not Detected CM   Not Detected CM  Not Detected CM     Comment: The carisoprodol immunoassay has cross-reactivity to   carisoprodol and meprobamate.    Naloxone  Not Detected          Gabapentin  Not Detected          Pregabalin  Not Detected          Alpha-OH-Midazolam  Not Detected          Zolpidem Metabolite  Not Detected

## 2022-05-10 NOTE — TELEPHONE ENCOUNTER
----- Message from Carmen Burkett sent at 5/10/2022  1:18 PM CDT -----  Regarding: Refill  Contact: DONTAE MOON [[id number]]  Who Called:DONTAE MOON [[id number]]        Refill or New Rx- Refill        RX Name and Strength:oxyCODONE-acetaminophen (PERCOCET)  mg per tablet        How is the patient currently taking it? (ex. 1XDay):1xday        Is this a 30 day or 90 day RX: 90day      Preferred Pharmacy with phone number:  [facility] - [address] [place]      Local or Mail Order: Local        Ordering Provider:Melissa Llanes        Would the patient rather a call back or a response via [facility]? Call back         Best Call Back Number:[phone]        Additional Information

## 2022-05-23 ENCOUNTER — HOSPITAL ENCOUNTER (OUTPATIENT)
Facility: OTHER | Age: 57
Discharge: HOME | End: 2022-05-23
Attending: ANESTHESIOLOGY | Admitting: ANESTHESIOLOGY
Payer: MEDICARE

## 2022-05-23 VITALS
WEIGHT: 240 LBS | RESPIRATION RATE: 16 BRPM | HEART RATE: 52 BPM | OXYGEN SATURATION: 98 % | SYSTOLIC BLOOD PRESSURE: 145 MMHG | BODY MASS INDEX: 39.99 KG/M2 | TEMPERATURE: 98 F | DIASTOLIC BLOOD PRESSURE: 85 MMHG | HEIGHT: 65 IN

## 2022-05-23 DIAGNOSIS — M17.0 PRIMARY OSTEOARTHRITIS OF BOTH KNEES: Primary | ICD-10-CM

## 2022-05-23 DIAGNOSIS — M25.569 CHRONIC KNEE PAIN, UNSPECIFIED LATERALITY: ICD-10-CM

## 2022-05-23 DIAGNOSIS — G89.29 CHRONIC PAIN: ICD-10-CM

## 2022-05-23 DIAGNOSIS — G89.29 CHRONIC KNEE PAIN, UNSPECIFIED LATERALITY: ICD-10-CM

## 2022-05-23 LAB — POCT GLUCOSE: 128 MG/DL (ref 70–110)

## 2022-05-23 PROCEDURE — A4649 SURGICAL SUPPLIES: HCPCS | Performed by: ANESTHESIOLOGY

## 2022-05-23 PROCEDURE — 64624 DSTRJ NULYT AGT GNCLR NRV: CPT | Mod: LT | Performed by: ANESTHESIOLOGY

## 2022-05-23 PROCEDURE — 64624 DSTRJ NULYT AGT GNCLR NRV: CPT | Mod: LT,,, | Performed by: ANESTHESIOLOGY

## 2022-05-23 PROCEDURE — 82947 ASSAY GLUCOSE BLOOD QUANT: CPT | Performed by: ANESTHESIOLOGY

## 2022-05-23 PROCEDURE — 63600175 PHARM REV CODE 636 W HCPCS: Performed by: ANESTHESIOLOGY

## 2022-05-23 PROCEDURE — 64624 PR DESTRUCT, NEUROLYTIC AGENT, GENICULAR NERVE, W/IMG: ICD-10-PCS | Mod: LT,,, | Performed by: ANESTHESIOLOGY

## 2022-05-23 PROCEDURE — 25000003 PHARM REV CODE 250: Performed by: ANESTHESIOLOGY

## 2022-05-23 RX ORDER — LIDOCAINE HYDROCHLORIDE 20 MG/ML
INJECTION, SOLUTION INFILTRATION; PERINEURAL
Status: DISCONTINUED | OUTPATIENT
Start: 2022-05-23 | End: 2022-05-23 | Stop reason: HOSPADM

## 2022-05-23 RX ORDER — FENTANYL CITRATE 50 UG/ML
INJECTION, SOLUTION INTRAMUSCULAR; INTRAVENOUS
Status: DISCONTINUED | OUTPATIENT
Start: 2022-05-23 | End: 2022-05-23 | Stop reason: HOSPADM

## 2022-05-23 RX ORDER — SODIUM CHLORIDE 9 MG/ML
INJECTION, SOLUTION INTRAVENOUS CONTINUOUS
Status: DISCONTINUED | OUTPATIENT
Start: 2022-05-23 | End: 2022-05-23 | Stop reason: HOSPADM

## 2022-05-23 RX ORDER — TRIAMCINOLONE ACETONIDE 40 MG/ML
INJECTION, SUSPENSION INTRA-ARTICULAR; INTRAMUSCULAR
Status: DISCONTINUED | OUTPATIENT
Start: 2022-05-23 | End: 2022-05-23 | Stop reason: HOSPADM

## 2022-05-23 RX ORDER — MIDAZOLAM HYDROCHLORIDE 1 MG/ML
INJECTION INTRAMUSCULAR; INTRAVENOUS
Status: DISCONTINUED | OUTPATIENT
Start: 2022-05-23 | End: 2022-05-23 | Stop reason: HOSPADM

## 2022-05-23 RX ORDER — BUPIVACAINE HYDROCHLORIDE 2.5 MG/ML
INJECTION, SOLUTION EPIDURAL; INFILTRATION; INTRACAUDAL
Status: DISCONTINUED | OUTPATIENT
Start: 2022-05-23 | End: 2022-05-23 | Stop reason: HOSPADM

## 2022-05-23 NOTE — DISCHARGE INSTRUCTIONS
Thank you for allowing us to care for you today. You may receive a survey about the care we provided. Your feedback is valuable and helps us provide excellent care throughout the community.     Home Care Instructions for Pain Management:    1. DIET:   You may resume your normal diet today.   2. BATHING:   You may shower with luke warm water. No tub baths or anything that will soak injection sites under water for the next 24 hours.  3. DRESSING:   You may remove your bandage today.   4. ACTIVITY LEVEL:   You may resume your normal activities 24 hrs after your procedure. Nothing strenuous today.  5. MEDICATIONS:   You may resume your normal medications today. To restart blood thinners, ask your doctor.  6. DRIVING    If you have received any sedatives by mouth today, you may not drive for 12 hours.    If you have received any sedation through your IV, you may not drive for 24 hrs.   7. SPECIAL INSTRUCTIONS:   No heat to the injection site for 24 hrs including, hot bath or shower, heating pad, moist heat, or hot tubs.    Use ice pack to injection site for any pain or discomfort.  Apply ice packs for 20 minute intervals as needed.    IF you have diabetes, be sure to monitor your blood sugar more closely. IF your injection contained steroids your blood sugar levels may become higher than normal.    If you are still having pain upon discharge:  Your pain may improve over the next 48 hours. The anesthetic (numbing medication) works immediately to 48 hours. IF your injection contained a steroid (anti-inflammatory medication), it takes approximately 3 days to start feeling relief and 7-10 days to see your greatest results from the medication. It is possible you may need subsequent injections. This would be discussed at your follow up appointment with pain management or your referring doctor.    Please call the PAIN MANAGEMENT office at [phone] or ON CALL pager at [phone] if you experienced any:   -Weakness or  loss of sensation  -Fever > 101.5  -Pain uncontrolled with oral medications   -Persistent nausea, vomiting, or diarrhea  -Redness or drainage from the injection sites, or any other worrisome concerns.   If physician on call was not reached or could not communicate with our office for any reason please go to the nearest emergency department. Adult Procedural Sedation Instructions    Recovery After Procedural Sedation (Adult)  You have been given medicine by vein to make you sleep during your surgery. This may have included both a pain medicine and sleeping medicine. Most of the effects have worn off. But you may still have some drowsiness for the next 6 to 8 hours.  Home care  Follow these guidelines when you get home:  For the next 8 hours, you should be watched by a responsible adult. This person should make sure your condition is not getting worse.  Don't drink any alcohol for the next 24 hours.  Don't drive, operate dangerous machinery, or make important business or personal decisions during the next 24 hours.  Note: Your healthcare provider may tell you not to take any medicine by mouth for pain or sleep in the next 4 hours. These medicines may react with the medicines you were given in the hospital. This could cause a much stronger response than usual.  Follow-up care  Follow up with your healthcare provider if you are not alert and back to your usual level of activity within 12 hours.  When to seek medical advice  Call your healthcare provider right away if any of these occur:  Drowsiness gets worse  Weakness or dizziness gets worse  Repeated vomiting  You can't be awakened   Date Last Reviewed: 10/18/2016  © 5013-7517 [employer]. [address] [place] [address], [place] [address]. All rights reserved. This information is not intended as a substitute for professional medical care. Always follow your healthcare professional's instructions.

## 2022-05-23 NOTE — BRIEF OP NOTE
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, Z96.659]  Chronic pain of both knees [M25.561, M25.562, G89.29]    Disposition: Home or self care    Patient Instructions:   Current Discharge Medication List      CONTINUE these medications which have NOT CHANGED    Details   albuterol (VENTOLIN HFA) 90 mcg/actuation inhaler INHALE TWO PUFFS INTO LUNGS EVERY 4 TO 6 HOURS AS NEEDED FOR SHORTNESS OF BREATH AND FOR WHEEZING  Qty: 18 g, Refills: 1    Comments: Please consider 90 day supplies to promote better adherence  Associated Diagnoses: Asthma, well controlled, mild intermittent      allopurinoL (ZYLOPRIM) 300 MG tablet Take 1 tablet (300 mg total) by mouth 2 (two) times daily.  Qty: 180 tablet, Refills: 3    Associated Diagnoses: Gout, unspecified cause, unspecified chronicity, unspecified site      atorvastatin (LIPITOR) 20 MG tablet Take 1 tablet (20 mg total) by mouth once daily.  Qty: 90 tablet, Refills: 3      b complex vitamins tablet Take 1 tablet by mouth every other day.       calcium citrate/vitamin D2 (ISIAH-CITRATE ORAL) Take 500 mg by mouth 2 (two) times daily.      colchicine (MITIGARE) 0.6 mg Cap Take 1 capsule (0.6 mg total) by mouth daily as needed (flare up).  Qty: 60 capsule, Refills: 3    Associated Diagnoses: Gout, unspecified cause, unspecified chronicity, unspecified site      cyanocobalamin (VITAMIN B-12) 1000 MCG tablet Take 100 mcg by mouth every morning.      diclofenac sodium (VOLTAREN) 1 % Gel Apply 2 g topically 4 (four) times daily as needed. To painful area on the feet.  Qty: 500 g, Refills: 5    Comments: 5 x 100g tubes  Associated Diagnoses:  Right foot pain; Enthesopathy of foot      FLUoxetine (PROZAC) 20 mg/5 mL (4 mg/mL) solution Take 5 mLs (20 mg total) by mouth once daily.  Qty: 450 mL, Refills: 3      fluticasone propionate (FLONASE) 50 mcg/actuation nasal spray 1 spray (50 mcg total) by Each Nostril route 2 (two) times daily as needed for Rhinitis.  Qty: 15 g, Refills: 6      indomethacin (INDOCIN) 50 MG capsule TAKE 1 CAPSULE BY MOUTH 2 TIMES DAILY WITH MEALS  Qty: 30 capsule, Refills: 2    Associated Diagnoses: Right foot pain; Enthesopathy of foot      LIDOcaine (XYLOCAINE) 5 % Oint ointment Apply topically as needed.      MULTIVIT-IRON-MIN-FOLIC ACID 3,500-18-0.4 UNIT-MG-MG ORAL CHEW Take 1 tablet by mouth 2 (two) times daily.       nystatin (MYCOSTATIN) powder Apply topically 2 (two) times daily.  Qty: 60 g, Refills: 3      omeprazole (PRILOSEC) 20 MG capsule Take 1 capsule (20 mg total) by mouth every morning.  Qty: 90 capsule, Refills: 3    Associated Diagnoses: Gastroesophageal reflux disease without esophagitis      oxybutynin (DITROPAN-XL) 5 MG TR24 Take 1 tablet (5 mg total) by mouth once daily.  Qty: 90 tablet, Refills: 3    Associated Diagnoses: Mixed incontinence urge and stress (male)(female)      oxyCODONE-acetaminophen (PERCOCET)  mg per tablet Take 1 tablet by mouth every 8 (eight) hours as needed for Pain.  Qty: 90 tablet, Refills: 0    Comments: Quantity prescribed more than 7 day supply? Yes, quantity medically necessary  Associated Diagnoses: Lumbar spondylosis; Chronic pain syndrome      vitamin D 185 MG Tab Take 5,000 mg by mouth every morning.                  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  Activity: as per instruction sheet.  Discharge to: Home  with a responsible adult.  Follow up: 2-4 weeks       Please call my office or pager at [phone] if experienced any weakness or loss of sensation, fever > 101.5, pain uncontrolled with oral medications, persistent nausea/vomiting/or diarrhea, redness or drainage from the incisions, or any other worrisome concerns. If physician on call was not reached or could not communicate with our office for any reason please go to the nearest emergency department

## 2022-05-23 NOTE — H&P
HPI  Patient presenting for Procedure(s) (LRB):  RADIOFREQUENCY ABLATION LEFT GENICULAR COOLED  2 of 2 (Left)     Patient on Anti-coagulation No    No health changes since previous encounter    Past Medical History:   Diagnosis Date    Allergy     Anemia     Asthma     Bilateral shoulder bursitis     Cervical stenosis of spine     Chronic pain     DDD (degenerative disc disease), cervical     Depression 1/28/2019    Diabetes mellitus type II     DJD (degenerative joint disease) of knee 6/19/2014    Facet arthritis of lumbar region 12/17/2015    Facet syndrome 12/17/2015    GERD (gastroesophageal reflux disease)     Heartburn     Hyperlipidemia     Hypertension     Morbid obesity     Neuromuscular disorder     PADDY (obstructive sleep apnea)     Proteinuria     Right carpal tunnel syndrome     Sacroiliitis 6/13/2018    Sacroiliitis     Sleep apnea      Past Surgical History:   Procedure Laterality Date    CARPAL TUNNEL RELEASE      CARPAL TUNNEL RELEASE  1980s    left    CARPAL TUNNEL RELEASE  2012    right    COLONOSCOPY N/A 6/15/2020    Procedure: COLONOSCOPY;  Surgeon: Jc Koo MD;  Location: [facility] (4TH FLR);  Service: Endoscopy;  Laterality: N/A;  COVID screening on 6/13/20 at [facility]  pt updated on drop off location and no visitor [facility]-    ESOPHAGOGASTRODUODENOSCOPY N/A 3/27/2019    Procedure: EGD (ESOPHAGOGASTRODUODENOSCOPY);  Surgeon: Robbin Bar MD;  Location: [facility] (2ND FLR);  Service: Endoscopy;  Laterality: N/A;  BMI 61.3/2nd floor case/svn    INJECTION OF JOINT Bilateral 6/9/2020    Procedure: INJECTION, JOINT, BILATERAL SI;  Surgeon: Lina Wagner MD;  Location: [facility] PAIN MGT;  Service: Pain Management;  Laterality: Bilateral;  B/L SI Joint Injection    INJECTION OF JOINT Bilateral 5/11/2021    Procedure: INJECTION, JOINT, SACROILIAC (SI) need consent;  Surgeon: Lina Wagner MD;  Location: [facility] PAIN T;  Service: Pain Management;   Laterality: Bilateral;    JOINT REPLACEMENT Bilateral     with 2 revisions on rt    KNEE SURGERY  3/2010    orthroscope    KNEE SURGERY  6-19-14    left TKR    LAPAROSCOPIC SLEEVE GASTRECTOMY N/A 8/28/2019    Procedure: GASTRECTOMY, SLEEVE, LAPAROSCOPIC with intraop EGD;  Surgeon: Heriberto Clements MD;  Location: [facility] OR 2ND FLR;  Service: General;  Laterality: N/A;    RADIOFREQUENCY ABLATION Right 7/9/2019    Procedure: RADIOFREQUENCY ABLATION;  Surgeon: Lina Wagner MD;  Location: [facility] PAIN MGT;  Service: Pain Management;  Laterality: Right;  RIGHT RFA L2,3,4,5  2 of 2    RADIOFREQUENCY ABLATION Left 12/19/2019    Procedure: RADIOFREQUENCY ABLATION, LEFT L2-L3-L4-L5 MEDIAL BRANCH 1 OF 2;  Surgeon: Lina Wagner MD;  Location: [facility] PAIN MGT;  Service: Pain Management;  Laterality: Left;    RADIOFREQUENCY ABLATION Right 12/31/2019    Procedure: RADIOFREQUENCY ABLATION, RIGHT L2-L3-L4-L5  2 OF 2;  Surgeon: Lina Wagner MD;  Location: [facility] PAIN MGT;  Service: Pain Management;  Laterality: Right;    RADIOFREQUENCY ABLATION Right 10/13/2020    Procedure: RADIOFREQUENCY ABLATION, Genicular Cooled 1 of 2;  Surgeon: Lina Wagner MD;  Location: [facility] PAIN MGT;  Service: Pain Management;  Laterality: Right;    RADIOFREQUENCY ABLATION Left 11/3/2020    Procedure: RADIOFREQUENCY ABLATION, GENICULAR COOLED  2 OF 2;  Surgeon: Lina Wagner MD;  Location: [facility] PAIN MGT;  Service: Pain Management;  Laterality: Left;    RADIOFREQUENCY ABLATION Left 12/15/2020    Procedure: RADIOFREQUENCY ABLATION LEFT L2,3,4,5 1 of 2;  Surgeon: Lina Wagner MD;  Location: [facility] PAIN MGT;  Service: Pain Management;  Laterality: Left;    RADIOFREQUENCY ABLATION Right 12/29/2020    Procedure: RADIOFREQUENCY ABLATION RIGHT L2,3,4,5 2 of 2;  Surgeon: Lina Wagner MD;  Location: [facility] PAIN MGT;  Service: Pain Management;  Laterality: Right;    RADIOFREQUENCY ABLATION Left 6/29/2021    Procedure:  RADIOFREQUENCY ABLATION GENICULAR COOLED;  Surgeon: Lina Wagner MD;  Location: [facility] PAIN MGT;  Service: Pain Management;  Laterality: Left;  1 of 2    RADIOFREQUENCY ABLATION Right 7/13/2021    Procedure: RADIOFREQUENCY ABLATION GENICULAR;  Surgeon: Lina Wagner MD;  Location: [facility] PAIN MGT;  Service: Pain Management;  Laterality: Right;  2 of 2    RADIOFREQUENCY ABLATION Right 8/24/2021    Procedure: RADIOFREQUENCY ABLATION, L2-L3-L4-L5 MEDIAL BRANCH 1 OF 2;  Surgeon: Lina Wagner MD;  Location: [facility] PAIN MGT;  Service: Pain Management;  Laterality: Right;    RADIOFREQUENCY ABLATION Left 9/11/2021    Procedure: RADIOFREQUENCY ABLATION, L2-L3-L4-L5 MEDIAL BR ANCH 2 OF 2;  Surgeon: Lina Wagner MD;  Location: [facility] PAIN MGT;  Service: Pain Management;  Laterality: Left;    RADIOFREQUENCY ABLATION Right 3/7/2022    Procedure: RADIOFREQUENCY ABLATION RIGHT L3,4,5 1 of 2, needs consent;  Surgeon: Israel Hurtado MD;  Location: [facility] PAIN MGT;  Service: Pain Management;  Laterality: Right;    RADIOFREQUENCY ABLATION Left 3/21/2022    Procedure: RADIOFREQUENCY ABLATION RIGHT L3,4,5 2 of 2, needs consent;  Surgeon: Israel Hurtado MD;  Location: [facility] PAIN MGT;  Service: Pain Management;  Laterality: Left;    RADIOFREQUENCY ABLATION Right 5/9/2022    Procedure: RADIOFREQUENCY ABLATION RIGHT GENICULAR COOLED 1 of 2;  Surgeon: Israel Hurtado MD;  Location: [facility] PAIN MGT;  Service: Pain Management;  Laterality: Right;    RADIOFREQUENCY ABLATION OF LUMBAR MEDIAL BRANCH NERVE AT SINGLE LEVEL Left 6/26/2018    Procedure: RADIOFREQUENCY ABLATION, NERVE, MEDIAL BRANCH, LUMBAR, 1 LEVEL;  Surgeon: Lina Wagner MD;  Location: [facility] PAIN MGT;  Service: Pain Management;  Laterality: Left;  Left Cooled RFA @ L2,3,4,5  [id number]  with Sedation    1 of 2    RADIOFREQUENCY ABLATION OF LUMBAR MEDIAL BRANCH NERVE AT SINGLE LEVEL Right 7/10/2018    Procedure: RADIOFREQUENCY ABLATION, NERVE, MEDIAL BRANCH, LUMBAR,  1 LEVEL;  Surgeon: Lina Wagner MD;  Location: [facility] PAIN MGT;  Service: Pain Management;  Laterality: Right;  RIght Cooled RFA @ L2,3,4,5  [id number] with Sedation    2 of 2    TRIGGER FINGER RELEASE Right 2017    TRIGGER FINGER RELEASE Left 7/29/2019    Procedure: RELEASE, TRIGGER FINGER, Left Thumb;  Surgeon: Velma Garcia MD;  Location: [facility] OR;  Service: Orthopedics;  Laterality: Left;  Local w/ MAC     Review of patient's allergies indicates:   Allergen Reactions    Strawberry Anaphylaxis      No current facility-administered medications for this encounter.     Facility-Administered Medications Ordered in Other Encounters   Medication    0.9%  NaCl infusion       PMHx, PSHx, Allergies, Medications reviewed in epic    ROS negative except pain complaints in HPI    OBJECTIVE:    LMP 06/26/2018     PHYSICAL EXAMINATION:    GENERAL: Well appearing, in no acute distress, alert and oriented x3.  PSYCH:  Mood and affect appropriate.  SKIN: Skin color, texture, turgor normal, no rashes or lesions which will impact the procedure.  CV: RRR with palpation of the radial artery.  PULM: No evidence of respiratory difficulty, symmetric chest rise. Clear to auscultation.  NEURO: Cranial nerves grossly intact.    Plan:    Proceed with procedure as planned Procedure(s) (LRB):  RADIOFREQUENCY ABLATION LEFT GENICULAR COOLED  2 of 2 (Left)    Stanley Moore  05/23/2022

## 2022-05-23 NOTE — OP NOTE
Therapeutic Genicular Cooled Nerve Radiofrequency Ablation under Fluoroscopy     The procedure, risks, benefits, and options were discussed with the patient. There are no contraindications to the procedure. The patent expressed understanding and agreed to the procedure. Informed written consent was obtained prior to the start of the procedure and can be found in the patient's chart.        PATIENT NAME: Alivia Thomas   MRN: [id number]     DATE OF PROCEDURE: 05/23/2022     PROCEDURE: Therapeutic Left Genicular Cooled Nerve Radiofrequency Ablation under Fluoroscopy    PRE-OP DIAGNOSIS: Primary osteoarthritis of both knees [M17.0]  Failure of total knee replacement, subsequent encounter [T84.018D, Z96.659]  Chronic pain of both knees [M25.561, M25.562, G89.29]    POST-OP DIAGNOSIS: Primary osteoarthritis of both knees [M17.0]  Failure of total knee replacement, subsequent encounter [T84.018D, Z96.659]  Chronic pain of both knees [M25.561, M25.562, G89.29]    PHYSICIAN: Israel Hurtado MD    ASSISTANTS: Stanley Moore MD ([employer] Pain Fellow)      MEDICATIONS INJECTED:  Preservative-free Kenalog 40mg with 9cc of Bupivicaine 0.25%    LOCAL ANESTHETIC INJECTED:   Xylocaine 2%    SEDATION:   Versed 2mg and Fentanyl 50mcg                                                                                                                                                                                     Conscious sedation ordered by M.D. Patient re-evaluation prior to administration of conscious sedation. No changes noted in patient's status from initial evaluation. The patient's vital signs were monitored by RN and patient remained hemodynamically stable throughout the procedure.    Event Time In   Sedation Start 0818   Sedation End 0838       ESTIMATED BLOOD LOSS:  None    COMPLICATIONS:  None     INTERVAL HISTORY: Patient has clinical findings of chronic knee pain. Patients has completed 2 previous diagnostic genicular nerve blocks with  at least 80% relief for the expected duration of the local anesthetic utilized.     TECHNIQUE: Time-out was performed to identify the patient and procedure to be performed. With the patient laying in a supine position, the surgical area was prepped and draped in the usual sterile fashion using ChloraPrep and fenestrated drape. Three target sites including the superior lateral genicular nerve where the lateral femoral shaft meets the epicondyle, the superior medial genicular nerve where the medial femoral shaft meets the epicondyle, and the inferior medial genicular nerve where the medial tibial shaft meets the epicondyle, were determined under fluoroscopic guidance. Skin anesthesia was achieved by injecting Lidocaine 2% over the injection sites. A 17 gauge, 50mm, 10mm active tip needle was then advanced under fluoroscopy in the AP and lateral views into the positions of the geniculate nerves at these levels. This was followed by motor testing at each of the nerves to ensure that there was no dorsiflexion of the foot. After negative aspiration for blood was confirmed, 1 mL of the lidocaine 2% listed above was injected slowly at each site. This was followed by cooled thermal lesioning at 80 degrees celsius for 150 seconds at each site. That was followed by slowly injecting 3 mL of the medication mixture listed above at each site. The needles were removed and bleeding was nil. A sterile dressing was applied. No specimens collected. The patient tolerated the procedure well and did not have any procedure related motor deficit at the conclusion of the procedure.    The patient was monitored after the procedure in the recovery area. They were given post-procedure and discharge instructions to follow at home. The patient was discharged in a stable condition.    PAIN BEFORE THE PROCEDURE: 10/10    PAIN AFTER THE PROCEDURE: 8/10    Israel Hurtado MD

## 2022-06-08 ENCOUNTER — DOCUMENTATION ONLY (OUTPATIENT)
Dept: INTERNAL MEDICINE | Facility: CLINIC | Age: 57
End: 2022-06-08
Payer: MEDICARE

## 2022-06-08 NOTE — PROGRESS NOTES
Please see note from May 9, 2022    CBC, CMP WNL  TSH WNL  HgbA1c 5.9  Uric acid WNL    1.  Diabetes - diet controlled  2.  HLP - controlled continue atorvastatin perioperatively  3.  Gout - Continue allopurinol perioperatively  4.  Depression - continue fluoxetine perioperatively  5.  GERD - continue PPI perioperatively  6.  S/p gastric sleeve   7.  Asthma - pt may need albuterol nebs q4-6 perioperatively    Pt is at acceptable risk for anesthesia and surgery and at low risk for cardio-pulmonary complications.

## 2022-06-10 ENCOUNTER — PATIENT MESSAGE (OUTPATIENT)
Dept: BARIATRICS | Facility: CLINIC | Age: 57
End: 2022-06-10
Payer: MEDICARE

## 2022-06-13 ENCOUNTER — TELEPHONE (OUTPATIENT)
Dept: PLASTIC SURGERY | Facility: CLINIC | Age: 57
End: 2022-06-13
Payer: MEDICARE

## 2022-06-13 NOTE — TELEPHONE ENCOUNTER
Contacted pt to discuss arrival time for surgery on Tuesday, June 14.  Pt advised to arrive on the 2nd floor, [facility] for 11:00a.  Pt reminded not to eat/drink after mid night midnight.  Pt verbalized understanding.

## 2022-06-14 ENCOUNTER — PATIENT MESSAGE (OUTPATIENT)
Dept: BARIATRICS | Facility: CLINIC | Age: 57
End: 2022-06-14
Payer: MEDICARE

## 2022-06-14 ENCOUNTER — HOSPITAL ENCOUNTER (OUTPATIENT)
Facility: HOSPITAL | Age: 57
Discharge: HOME | End: 2022-06-15
Attending: EMERGENCY MEDICINE | Admitting: EMERGENCY MEDICINE
Payer: MEDICARE

## 2022-06-14 ENCOUNTER — NURSE TRIAGE (OUTPATIENT)
Dept: ADMINISTRATIVE | Facility: CLINIC | Age: 57
End: 2022-06-14
Payer: MEDICARE

## 2022-06-14 ENCOUNTER — ANESTHESIA (OUTPATIENT)
Dept: SURGERY | Facility: HOSPITAL | Age: 57
End: 2022-06-14
Payer: MEDICARE

## 2022-06-14 ENCOUNTER — ANESTHESIA EVENT (OUTPATIENT)
Dept: SURGERY | Facility: HOSPITAL | Age: 57
End: 2022-06-14
Payer: MEDICARE

## 2022-06-14 DIAGNOSIS — E11.9 TYPE 2 DIABETES MELLITUS WITHOUT COMPLICATION, WITHOUT LONG-TERM CURRENT USE OF INSULIN: Primary | Chronic | ICD-10-CM

## 2022-06-14 DIAGNOSIS — Z98.890 S/P PANNICULECTOMY: ICD-10-CM

## 2022-06-14 DIAGNOSIS — R07.9 CHEST PAIN: ICD-10-CM

## 2022-06-14 DIAGNOSIS — R06.89 HYPERCAPNIA: ICD-10-CM

## 2022-06-14 DIAGNOSIS — Z90.3 HISTORY OF SLEEVE GASTRECTOMY: Chronic | ICD-10-CM

## 2022-06-14 DIAGNOSIS — R09.02 HYPOXIA: ICD-10-CM

## 2022-06-14 LAB
ABO + RH BLD: NORMAL
ALLENS TEST: ABNORMAL
BASOPHILS # BLD AUTO: 0.01 K/UL (ref 0–0.2)
BASOPHILS NFR BLD: 0.1 % (ref 0–1.9)
BLD GP AB SCN CELLS X3 SERPL QL: NORMAL
BNP SERPL-MCNC: 60 PG/ML (ref 0–99)
CTP QC/QA: YES
DELSYS: ABNORMAL
DIFFERENTIAL METHOD: ABNORMAL
EOSINOPHIL # BLD AUTO: 0 K/UL (ref 0–0.5)
EOSINOPHIL NFR BLD: 0 % (ref 0–8)
ERYTHROCYTE [DISTWIDTH] IN BLOOD BY AUTOMATED COUNT: 13.6 % (ref 11.5–14.5)
HCO3 UR-SCNC: 28.7 MMOL/L (ref 24–28)
HCT VFR BLD AUTO: 39.7 % (ref 37–48.5)
HGB BLD-MCNC: 12.7 G/DL (ref 12–16)
IMM GRANULOCYTES # BLD AUTO: 0.03 K/UL (ref 0–0.04)
IMM GRANULOCYTES NFR BLD AUTO: 0.3 % (ref 0–0.5)
LYMPHOCYTES # BLD AUTO: 0.5 K/UL (ref 1–4.8)
LYMPHOCYTES NFR BLD: 4.1 % (ref 18–48)
MCH RBC QN AUTO: 31.9 PG (ref 27–31)
MCHC RBC AUTO-ENTMCNC: 32 G/DL (ref 32–36)
MCV RBC AUTO: 100 FL (ref 82–98)
MONOCYTES # BLD AUTO: 0.4 K/UL (ref 0.3–1)
MONOCYTES NFR BLD: 3.5 % (ref 4–15)
NEUTROPHILS # BLD AUTO: 10.3 K/UL (ref 1.8–7.7)
NEUTROPHILS NFR BLD: 92 % (ref 38–73)
NRBC BLD-RTO: 0 /100 WBC
PCO2 BLDA: 64.6 MMHG (ref 35–45)
PH SMN: 7.25 [PH] (ref 7.35–7.45)
PLATELET # BLD AUTO: 221 K/UL (ref 150–450)
PMV BLD AUTO: 11.3 FL (ref 9.2–12.9)
PO2 BLDA: 24 MMHG (ref 40–60)
POC BE: 2 MMOL/L
POC SATURATED O2: 32 % (ref 95–100)
POC TCO2: 31 MMOL/L (ref 24–29)
RBC # BLD AUTO: 3.98 M/UL (ref 4–5.4)
SAMPLE: ABNORMAL
SARS-COV-2 RDRP RESP QL NAA+PROBE: NEGATIVE
SITE: ABNORMAL
WBC # BLD AUTO: 11.18 K/UL (ref 3.9–12.7)

## 2022-06-14 PROCEDURE — 82803 BLOOD GASES ANY COMBINATION: CPT

## 2022-06-14 PROCEDURE — 63600175 PHARM REV CODE 636 W HCPCS: Performed by: STUDENT IN AN ORGANIZED HEALTH CARE EDUCATION/TRAINING PROGRAM

## 2022-06-14 PROCEDURE — 25000003 PHARM REV CODE 250: Performed by: STUDENT IN AN ORGANIZED HEALTH CARE EDUCATION/TRAINING PROGRAM

## 2022-06-14 PROCEDURE — 99285 EMERGENCY DEPT VISIT HI MDM: CPT | Mod: 25

## 2022-06-14 PROCEDURE — 86850 RBC ANTIBODY SCREEN: CPT | Performed by: STUDENT IN AN ORGANIZED HEALTH CARE EDUCATION/TRAINING PROGRAM

## 2022-06-14 PROCEDURE — 93005 ELECTROCARDIOGRAM TRACING: CPT | Mod: 59

## 2022-06-14 PROCEDURE — 27000190 HC CPAP FULL FACE MASK W/VALVE

## 2022-06-14 PROCEDURE — U0002 COVID-19 LAB TEST NON-CDC: HCPCS | Performed by: STUDENT IN AN ORGANIZED HEALTH CARE EDUCATION/TRAINING PROGRAM

## 2022-06-14 PROCEDURE — D9220A PRA ANESTHESIA: Mod: ,,, | Performed by: ANESTHESIOLOGY

## 2022-06-14 PROCEDURE — 27000221 HC OXYGEN, UP TO 24 HOURS

## 2022-06-14 PROCEDURE — 94640 AIRWAY INHALATION TREATMENT: CPT

## 2022-06-14 PROCEDURE — 99285 EMERGENCY DEPT VISIT HI MDM: CPT | Mod: GC,CS,, | Performed by: EMERGENCY MEDICINE

## 2022-06-14 PROCEDURE — 25000003 PHARM REV CODE 250: Performed by: ANESTHESIOLOGY

## 2022-06-14 PROCEDURE — 99900035 HC TECH TIME PER 15 MIN (STAT)

## 2022-06-14 PROCEDURE — 94660 CPAP INITIATION&MGMT: CPT

## 2022-06-14 PROCEDURE — 94761 N-INVAS EAR/PLS OXIMETRY MLT: CPT

## 2022-06-14 PROCEDURE — 85025 COMPLETE CBC W/AUTO DIFF WBC: CPT | Performed by: STUDENT IN AN ORGANIZED HEALTH CARE EDUCATION/TRAINING PROGRAM

## 2022-06-14 PROCEDURE — 99285 PR EMERGENCY DEPT VISIT,LEVEL V: ICD-10-PCS | Mod: GC,CS,, | Performed by: EMERGENCY MEDICINE

## 2022-06-14 PROCEDURE — 93010 EKG 12-LEAD: ICD-10-PCS | Mod: ,,, | Performed by: INTERNAL MEDICINE

## 2022-06-14 PROCEDURE — 25000242 PHARM REV CODE 250 ALT 637 W/ HCPCS: Performed by: STUDENT IN AN ORGANIZED HEALTH CARE EDUCATION/TRAINING PROGRAM

## 2022-06-14 PROCEDURE — 99900031 HC PATIENT EDUCATION (STAT)

## 2022-06-14 PROCEDURE — D9220A PRA ANESTHESIA: ICD-10-PCS | Mod: ,,, | Performed by: ANESTHESIOLOGY

## 2022-06-14 PROCEDURE — 63600175 PHARM REV CODE 636 W HCPCS: Performed by: ANESTHESIOLOGY

## 2022-06-14 PROCEDURE — 93010 ELECTROCARDIOGRAM REPORT: CPT | Mod: ,,, | Performed by: INTERNAL MEDICINE

## 2022-06-14 PROCEDURE — 83880 ASSAY OF NATRIURETIC PEPTIDE: CPT | Performed by: STUDENT IN AN ORGANIZED HEALTH CARE EDUCATION/TRAINING PROGRAM

## 2022-06-14 PROCEDURE — 96374 THER/PROPH/DIAG INJ IV PUSH: CPT | Mod: 59

## 2022-06-14 RX ORDER — KETAMINE HCL IN 0.9 % NACL 50 MG/5 ML
SYRINGE (ML) INTRAVENOUS
Status: DISCONTINUED | OUTPATIENT
Start: 2022-06-14 | End: 2022-06-14

## 2022-06-14 RX ORDER — METHYLPREDNISOLONE SOD SUCC 125 MG
125 VIAL (EA) INJECTION
Status: COMPLETED | OUTPATIENT
Start: 2022-06-14 | End: 2022-06-14

## 2022-06-14 RX ORDER — NEOSTIGMINE METHYLSULFATE 0.5 MG/ML
INJECTION, SOLUTION INTRAVENOUS
Status: DISCONTINUED | OUTPATIENT
Start: 2022-06-14 | End: 2022-06-14

## 2022-06-14 RX ORDER — PROPOFOL 10 MG/ML
VIAL (ML) INTRAVENOUS
Status: DISCONTINUED | OUTPATIENT
Start: 2022-06-14 | End: 2022-06-14

## 2022-06-14 RX ORDER — LIDOCAINE HYDROCHLORIDE 20 MG/ML
INJECTION, SOLUTION EPIDURAL; INFILTRATION; INTRACAUDAL; PERINEURAL
Status: DISCONTINUED | OUTPATIENT
Start: 2022-06-14 | End: 2022-06-14

## 2022-06-14 RX ORDER — FENTANYL CITRATE 50 UG/ML
INJECTION, SOLUTION INTRAMUSCULAR; INTRAVENOUS
Status: DISCONTINUED | OUTPATIENT
Start: 2022-06-14 | End: 2022-06-14

## 2022-06-14 RX ORDER — IPRATROPIUM BROMIDE AND ALBUTEROL SULFATE 2.5; .5 MG/3ML; MG/3ML
3 SOLUTION RESPIRATORY (INHALATION)
Status: COMPLETED | OUTPATIENT
Start: 2022-06-14 | End: 2022-06-14

## 2022-06-14 RX ORDER — HYDROMORPHONE HYDROCHLORIDE 2 MG/ML
INJECTION, SOLUTION INTRAMUSCULAR; INTRAVENOUS; SUBCUTANEOUS
Status: DISCONTINUED | OUTPATIENT
Start: 2022-06-14 | End: 2022-06-14

## 2022-06-14 RX ORDER — MIDAZOLAM HYDROCHLORIDE 1 MG/ML
INJECTION, SOLUTION INTRAMUSCULAR; INTRAVENOUS
Status: DISCONTINUED | OUTPATIENT
Start: 2022-06-14 | End: 2022-06-14

## 2022-06-14 RX ORDER — ROCURONIUM BROMIDE 10 MG/ML
INJECTION, SOLUTION INTRAVENOUS
Status: DISCONTINUED | OUTPATIENT
Start: 2022-06-14 | End: 2022-06-14

## 2022-06-14 RX ORDER — DEXAMETHASONE SODIUM PHOSPHATE 4 MG/ML
INJECTION, SOLUTION INTRA-ARTICULAR; INTRALESIONAL; INTRAMUSCULAR; INTRAVENOUS; SOFT TISSUE
Status: DISCONTINUED | OUTPATIENT
Start: 2022-06-14 | End: 2022-06-14

## 2022-06-14 RX ORDER — LIDOCAINE HYDROCHLORIDE 40 MG/ML
SOLUTION TOPICAL
Status: DISCONTINUED | OUTPATIENT
Start: 2022-06-14 | End: 2022-06-14

## 2022-06-14 RX ORDER — PHENYLEPHRINE HCL IN 0.9% NACL 1 MG/10 ML
SYRINGE (ML) INTRAVENOUS
Status: DISCONTINUED | OUTPATIENT
Start: 2022-06-14 | End: 2022-06-14

## 2022-06-14 RX ORDER — ONDANSETRON 2 MG/ML
INJECTION INTRAMUSCULAR; INTRAVENOUS
Status: DISCONTINUED | OUTPATIENT
Start: 2022-06-14 | End: 2022-06-14

## 2022-06-14 RX ADMIN — FENTANYL CITRATE 50 MCG: 50 INJECTION INTRAMUSCULAR; INTRAVENOUS at 04:06

## 2022-06-14 RX ADMIN — SODIUM CHLORIDE, SODIUM GLUCONATE, SODIUM ACETATE, POTASSIUM CHLORIDE, MAGNESIUM CHLORIDE, SODIUM PHOSPHATE, DIBASIC, AND POTASSIUM PHOSPHATE: .53; .5; .37; .037; .03; .012; .00082 INJECTION, SOLUTION INTRAVENOUS at 05:06

## 2022-06-14 RX ADMIN — MIDAZOLAM HYDROCHLORIDE 2 MG: 1 INJECTION, SOLUTION INTRAMUSCULAR; INTRAVENOUS at 03:06

## 2022-06-14 RX ADMIN — DEXAMETHASONE SODIUM PHOSPHATE 4 MG: 4 INJECTION INTRA-ARTICULAR; INTRALESIONAL; INTRAMUSCULAR; INTRAVENOUS; SOFT TISSUE at 04:06

## 2022-06-14 RX ADMIN — Medication 100 MCG: at 05:06

## 2022-06-14 RX ADMIN — Medication 50 MG: at 04:06

## 2022-06-14 RX ADMIN — Medication 2 G: at 03:06

## 2022-06-14 RX ADMIN — Medication 50 MCG: at 05:06

## 2022-06-14 RX ADMIN — Medication 100 MCG: at 06:06

## 2022-06-14 RX ADMIN — PROPOFOL 180 MG: 10 INJECTION, EMULSION INTRAVENOUS at 03:06

## 2022-06-14 RX ADMIN — METHYLPREDNISOLONE SODIUM SUCCINATE 125 MG: 125 INJECTION, POWDER, FOR SOLUTION INTRAMUSCULAR; INTRAVENOUS at 11:06

## 2022-06-14 RX ADMIN — HYDROMORPHONE HYDROCHLORIDE 1 MG: 2 INJECTION, SOLUTION INTRAMUSCULAR; INTRAVENOUS; SUBCUTANEOUS at 04:06

## 2022-06-14 RX ADMIN — LIDOCAINE HYDROCHLORIDE 80 MG: 20 INJECTION, SOLUTION EPIDURAL; INFILTRATION; INTRACAUDAL at 03:06

## 2022-06-14 RX ADMIN — ROCURONIUM BROMIDE 50 MG: 10 INJECTION INTRAVENOUS at 03:06

## 2022-06-14 RX ADMIN — IPRATROPIUM BROMIDE AND ALBUTEROL SULFATE 3 ML: 2.5; .5 SOLUTION RESPIRATORY (INHALATION) at 11:06

## 2022-06-14 RX ADMIN — FENTANYL CITRATE 50 MCG: 50 INJECTION INTRAMUSCULAR; INTRAVENOUS at 03:06

## 2022-06-14 RX ADMIN — ROCURONIUM BROMIDE 30 MG: 10 INJECTION INTRAVENOUS at 04:06

## 2022-06-14 RX ADMIN — LIDOCAINE HYDROCHLORIDE 4 ML: 40 SOLUTION TOPICAL at 03:06

## 2022-06-14 RX ADMIN — ONDANSETRON HYDROCHLORIDE 4 MG: 2 INJECTION INTRAMUSCULAR; INTRAVENOUS at 05:06

## 2022-06-14 RX ADMIN — Medication 100 MCG: at 04:06

## 2022-06-14 RX ADMIN — SODIUM CHLORIDE: 0.9 INJECTION, SOLUTION INTRAVENOUS at 03:06

## 2022-06-14 RX ADMIN — ROCURONIUM BROMIDE 20 MG: 10 INJECTION INTRAVENOUS at 04:06

## 2022-06-14 RX ADMIN — NEOSTIGMINE METHYLSULFATE 5 MG: 0.5 INJECTION INTRAVENOUS at 06:06

## 2022-06-14 RX ADMIN — Medication 50 MCG: at 06:06

## 2022-06-14 RX ADMIN — ROCURONIUM BROMIDE 20 MG: 10 INJECTION INTRAVENOUS at 05:06

## 2022-06-14 RX ADMIN — GLYCOPYRROLATE 0.6 MG: 0.2 INJECTION, SOLUTION INTRAMUSCULAR; INTRAVITREAL at 06:06

## 2022-06-14 NOTE — ANESTHESIA PREPROCEDURE EVALUATION
06/14/2022  Alivia Thomas is a 56 y.o., female.    Pre-operative evaluation for Procedure(s) (LRB):  PANNICULECTOMY (N/A)      Patient Active Problem List   Diagnosis    Morbid obesity    PADDY on CPAP    Type 2 diabetes mellitus, uncontrolled    Nuclear sclerosis - Both Eyes    Hyperlipemia    Proteinuria    Bilateral knee pain    DJD (degenerative joint disease) of knee    Debility    Prosthetic joint implant failure    Failed total knee arthroplasty    Right knee pain    Knee pain    S/P total knee replacement    Lumbago    CTS (carpal tunnel syndrome)    Right carpal tunnel syndrome    Chronic, continuous use of opioids    Mild intermittent asthma without complication    Left arm pain    Left shoulder pain    Allergic reaction to food    DDD (degenerative disc disease), cervical    Cervical radiculopathy    Cervical spondylosis    Cubital tunnel syndrome on right    Bilateral shoulder bursitis    Cervical stenosis of spinal canal    DDD (degenerative disc disease), thoracic    Thoracic spondylosis    Spondylolisthesis of lumbar region    Lumbar spondylosis    Spondylolisthesis of cervical region    Cervical spine instability    Myeloradiculopathy    Neural foraminal stenosis of cervical spine    DDD (degenerative disc disease), lumbar    Idiopathic scoliosis of thoracolumbar spine    Bilateral shoulder region arthritis    Impingement syndrome of right shoulder    Trochanteric bursitis of both hips    Chronic pain    Depression    Recurrent major depressive disorder, in partial remission    GERD (gastroesophageal reflux disease)    Trigger finger    Dyspnea    BMI 45.0-49.9, adult    Asthma    Sacroiliac joint pain    Spondylosis of lumbosacral region without myelopathy or radiculopathy    Subacromial bursitis of left shoulder joint    Sacroiliitis     Snoring       Review of patient's allergies indicates:   Allergen Reactions    Strawberry Anaphylaxis       Current Facility-Administered Medications on File Prior to Encounter   Medication Dose Route Frequency Provider Last Rate Last Admin    0.9%  NaCl infusion   Intravenous Continuous Lina Wagner MD 25 mL/hr at 12/15/20 1506 25 mL/hr at 12/15/20 1506     Current Outpatient Medications on File Prior to Encounter   Medication Sig Dispense Refill    albuterol (VENTOLIN HFA) 90 mcg/actuation inhaler INHALE TWO PUFFS INTO LUNGS EVERY 4 TO 6 HOURS AS NEEDED FOR SHORTNESS OF BREATH AND FOR WHEEZING 18 g 1    allopurinoL (ZYLOPRIM) 300 MG tablet Take 1 tablet (300 mg total) by mouth 2 (two) times daily. 180 tablet 3    atorvastatin (LIPITOR) 20 MG tablet Take 1 tablet (20 mg total) by mouth once daily. 90 tablet 3    b complex vitamins tablet Take 1 tablet by mouth every other day.       calcium citrate/vitamin D2 (ISIAH-CITRATE ORAL) Take 500 mg by mouth 2 (two) times daily.      cyanocobalamin (VITAMIN B-12) 1000 MCG tablet Take 100 mcg by mouth every morning.      diclofenac sodium (VOLTAREN) 1 % Gel Apply 2 g topically 4 (four) times daily as needed. To painful area on the feet. 500 g 5    LIDOcaine (XYLOCAINE) 5 % Oint ointment Apply topically as needed.      MULTIVIT-IRON-MIN-FOLIC ACID 3,500-18-0.4 UNIT-MG-MG ORAL CHEW Take 1 tablet by mouth 2 (two) times daily.       nystatin (MYCOSTATIN) powder Apply topically 2 (two) times daily. 60 g 3    omeprazole (PRILOSEC) 20 MG capsule Take 1 capsule (20 mg total) by mouth every morning. 90 capsule 3    vitamin D 185 MG Tab Take 5,000 mg by mouth every morning.          Past Surgical History:   Procedure Laterality Date    CARPAL TUNNEL RELEASE      CARPAL TUNNEL RELEASE  1980s    left    CARPAL TUNNEL RELEASE  2012    right    COLONOSCOPY N/A 6/15/2020    Procedure: COLONOSCOPY;  Surgeon: Jc Koo MD;  Location: [facility] ([address] [place] [address]);  Service:  Endoscopy;  Laterality: N/A;  COVID screening on 6/13/20 at [facility]-rb  pt updated on drop off location and no visitor policy-rb    ESOPHAGOGASTRODUODENOSCOPY N/A 3/27/2019    Procedure: EGD (ESOPHAGOGASTRODUODENOSCOPY);  Surgeon: Robbin Bar MD;  Location: [facility] (2ND FLR);  Service: Endoscopy;  Laterality: N/A;  BMI 61.3/2nd floor case/svn    INJECTION OF JOINT Bilateral 6/9/2020    Procedure: INJECTION, JOINT, BILATERAL SI;  Surgeon: Lina Wagner MD;  Location: [facility] PAIN MGT;  Service: Pain Management;  Laterality: Bilateral;  B/L SI Joint Injection    INJECTION OF JOINT Bilateral 5/11/2021    Procedure: INJECTION, JOINT, SACROILIAC (SI) need consent;  Surgeon: Lina Wagner MD;  Location: [facility]IN [facility]GT;  Service: Pain Management;  Laterality: Bilateral;    JOINT REPLACEMENT Bilateral     with 2 revisions on rt    KNEE SURGERY  3/2010    orthroscope    KNEE SURGERY  6-19-14    left TKR    LAPAROSCOPIC SLEEVE GASTRECTOMY N/A 8/28/2019    Procedure: GASTRECTOMY, SLEEVE, LAPAROSCOPIC with intraop EGD;  Surgeon: Heriberto Clements MD;  Location: [facility] OR 2ND FLR;  Service: General;  Laterality: N/A;    RADIOFREQUENCY ABLATION Right 7/9/2019    Procedure: RADIOFREQUENCY ABLATION;  Surgeon: Lina Wagner MD;  Location: [facility]IN [facility]GT;  Service: Pain Management;  Laterality: Right;  RIGHT RFA L2,3,4,5  2 of 2    RADIOFREQUENCY ABLATION Left 12/19/2019    Procedure: RADIOFREQUENCY ABLATION, LEFT L2-L3-L4-L5 MEDIAL BRANCH 1 OF 2;  Surgeon: Lina Wagner MD;  Location: [facility] PAIN [facility]GT;  Service: Pain Management;  Laterality: Left;    RADIOFREQUENCY ABLATION Right 12/31/2019    Procedure: RADIOFREQUENCY ABLATION, RIGHT L2-L3-L4-L5  2 OF 2;  Surgeon: Lina Wagner MD;  Location: [facility]IN [facility]GT;  Service: Pain Management;  Laterality: Right;    RADIOFREQUENCY ABLATION Right 10/13/2020    Procedure: RADIOFREQUENCY ABLATION, Genicular Cooled 1 of 2;  Surgeon: Lina Wagner,  MD;  Location: [facility] PAIN MGT;  Service: Pain Management;  Laterality: Right;    RADIOFREQUENCY ABLATION Left 11/3/2020    Procedure: RADIOFREQUENCY ABLATION, GENICULAR COOLED  2 OF 2;  Surgeon: Lina Wagner MD;  Location: [facility] PAIN MGT;  Service: Pain Management;  Laterality: Left;    RADIOFREQUENCY ABLATION Left 12/15/2020    Procedure: RADIOFREQUENCY ABLATION LEFT L2,3,4,5 1 of 2;  Surgeon: Lina Wagner MD;  Location: [facility] PAIN [facility]GT;  Service: Pain Management;  Laterality: Left;    RADIOFREQUENCY ABLATION Right 12/29/2020    Procedure: RADIOFREQUENCY ABLATION RIGHT L2,3,4,5 2 of 2;  Surgeon: Lina Wagner MD;  Location: [facility] PAIN MGT;  Service: Pain Management;  Laterality: Right;    RADIOFREQUENCY ABLATION Left 6/29/2021    Procedure: RADIOFREQUENCY ABLATION GENICULAR COOLED;  Surgeon: Lina Wagner MD;  Location: [facility] PAIN [facility]GT;  Service: Pain Management;  Laterality: Left;  1 of 2    RADIOFREQUENCY ABLATION Right 7/13/2021    Procedure: RADIOFREQUENCY ABLATION GENICULAR;  Surgeon: Lina Wagner MD;  Location: [facility] PAIN [facility]GT;  Service: Pain Management;  Laterality: Right;  2 of 2    RADIOFREQUENCY ABLATION Right 8/24/2021    Procedure: RADIOFREQUENCY ABLATION, L2-L3-L4-L5 MEDIAL BRANCH 1 OF 2;  Surgeon: Lina Wagner MD;  Location: [facility] PAIN [facility]GT;  Service: Pain Management;  Laterality: Right;    RADIOFREQUENCY ABLATION Left 9/11/2021    Procedure: RADIOFREQUENCY ABLATION, L2-L3-L4-L5 MEDIAL BR ANCH 2 OF 2;  Surgeon: Lina Wagner MD;  Location: [facility] PAIN MGT;  Service: Pain Management;  Laterality: Left;    RADIOFREQUENCY ABLATION Right 3/7/2022    Procedure: RADIOFREQUENCY ABLATION RIGHT L3,4,5 1 of 2, needs consent;  Surgeon: Israel Hurtado MD;  Location: [facility] PAIN [facility]GT;  Service: Pain Management;  Laterality: Right;    RADIOFREQUENCY ABLATION Left 3/21/2022    Procedure: RADIOFREQUENCY ABLATION RIGHT L3,4,5 2 of 2, needs consent;  Surgeon: Israel Hurtado MD;  Location:  [facility] PAIN MGT;  Service: Pain Management;  Laterality: Left;    RADIOFREQUENCY ABLATION Right 5/9/2022    Procedure: RADIOFREQUENCY ABLATION RIGHT GENICULAR COOLED 1 of 2;  Surgeon: Israel Hurtado MD;  Location: [facility] PAIN [facility]GT;  Service: Pain Management;  Laterality: Right;    RADIOFREQUENCY ABLATION Left 5/23/2022    Procedure: RADIOFREQUENCY ABLATION LEFT GENICULAR COOLED  2 of 2;  Surgeon: Israel Hurtado MD;  Location: [facility] PAIN [facility]GT;  Service: Pain Management;  Laterality: Left;    RADIOFREQUENCY ABLATION OF LUMBAR MEDIAL BRANCH NERVE AT SINGLE LEVEL Left 6/26/2018    Procedure: RADIOFREQUENCY ABLATION, NERVE, MEDIAL BRANCH, LUMBAR, 1 LEVEL;  Surgeon: Lina Wagner MD;  Location: [facility] PAIN [facility]GT;  Service: Pain Management;  Laterality: Left;  Left Cooled RFA @ L2,3,4,5  [id number]  with Sedation    1 of 2    RADIOFREQUENCY ABLATION OF LUMBAR MEDIAL BRANCH NERVE AT SINGLE LEVEL Right 7/10/2018    Procedure: RADIOFREQUENCY ABLATION, NERVE, MEDIAL BRANCH, LUMBAR, 1 LEVEL;  Surgeon: Lina Wagner MD;  Location: [facility] PAIN [facility]GT;  Service: Pain Management;  Laterality: Right;  RIght Cooled RFA @ L2,3,4,5  [id number] with Sedation    2 of 2    TRIGGER FINGER RELEASE Right 2017    TRIGGER FINGER RELEASE Left 7/29/2019    Procedure: RELEASE, TRIGGER FINGER, Left Thumb;  Surgeon: Velma Garcia MD;  Location: [facility] OR;  Service: Orthopedics;  Laterality: Left;  Local w/ MAC       Social History     Socioeconomic History    Marital status:    Tobacco Use    Smoking status: Never Smoker    Smokeless tobacco: Never Used   Substance and Sexual Activity    Alcohol use: Yes     Comment: occasionally     Drug use: No    Sexual activity: Yes     Partners: Female     Birth control/protection: None   Social History Narrative    Disabled. The patient is the youngest of 6 siblings. Single. Lives with single-sex partner.                      CBC: No results for input(s): WBC, RBC, HGB, HCT, PLT, MCV, MCH,  MCHC in the last 72 hours.    CMP: No results for input(s): NA, K, CL, CO2, BUN, CREATININE, GLU, MG, PHOS, CALCIUM, ALBUMIN, PROT, ALKPHOS, ALT, AST, BILITOT in the last 72 hours.    INR  No results for input(s): PT, INR, PROTIME, APTT in the last 72 hours.        Diagnostic Studies:      EKD Echo:  No results found. However, due to the size of the patient record, not all encounters were searched. Please check Results Review for a complete set of results.      Pre-op Assessment          Review of Systems  Anesthesia Hx:  No problems with previous Anesthesia 2019 : Easy MV. DLx1 with Craig 2. Grade 1 view. Denies Family Hx of Anesthesia complications.   Denies Personal Hx of Anesthesia complications.   Social:  Non-Smoker, Social Alcohol Use    Hematology/Oncology:  Hematology Normal   Oncology Normal     EENT/Dental:   Wears glasses/ x1 crown   Cardiovascular:   Hypertension hyperlipidemia Walking daily/Housework   Pulmonary:   Asthma asymptomatic Shortness of breath Sleep Apnea, CPAP    Renal/:  Renal/ Normal     Hepatic/GI:   GERD    Musculoskeletal:   Arthritis  S/P-Bilateral Knees  Replaced    Chronic pain on opioids   Neurological:   Neuromuscular Disease, S/P- Carpal Tunnel surgery repair/Right carpal tunnel syndrome/Cervical radiculopathy  Chronic Pain Syndrome   Endocrine:   Diabetes, type 2 Pannus, abdominal Obesity / BMI > 30  Dermatological:  Skin Normal    Psych:   Psychiatric History anxiety depression          Physical Exam  General: Well nourished, Cooperative and Alert    Airway:  Mallampati: II   Mouth Opening: Normal  TM Distance: Normal  Tongue: Normal  Neck ROM: Normal ROM    Dental:    Chest/Lungs:  Normal Respiratory Rate    Heart:  Rate: Normal        Anesthesia Plan  Type of Anesthesia, risks & benefits discussed:    Anesthesia Type: Gen ETT  Intra-op Monitoring Plan: Standard ASA Monitors  Post Op Pain Control Plan: multimodal analgesia and IV/PO Opioids PRN  Induction:   IV  Airway Plan: Direct, Post-Induction  Informed Consent: Informed consent signed with the Patient and all parties understand the risks and agree with anesthesia plan.  All questions answered.   ASA Score: 3    Ready For Surgery From Anesthesia Perspective.     .

## 2022-06-14 NOTE — ANESTHESIA PROCEDURE NOTES
Intubation    Date/Time: 6/14/2022 3:46 PM  Performed by: Darius Mazariegos MD  Authorized by: Darius Mazariegos MD     Intubation:     Induction:  Intravenous    Intubated:  Postinduction    Mask Ventilation:  Easy mask    Attempts:  1    Attempted By:  Staff anesthesiologist    Method of Intubation:  Direct    Blade:  Ziyad 4    Laryngeal View Grade: Grade IIA - cords partially seen      Difficult Airway Encountered?: No      Complications:  None    Airway Device:  Oral endotracheal tube    Airway Device Size:  7.5    Style/Cuff Inflation:  Cuffed    Inflation Amount (mL):  6    Tube secured:  22    Secured at:  The lips    Placement Verified By:  Capnometry    Complicating Factors:  Obesity    Findings Post-Intubation:  BS equal bilateral

## 2022-06-14 NOTE — TRANSFER OF CARE
"Anesthesia Transfer of Care Note    Patient: Alivia Thmoas    Procedure(s) Performed: Procedure(s) (LRB):  PANNICULECTOMY (N/A)    Patient location: PACU    Anesthesia Type: general    Transport from OR: Transported from OR on 6-10 L/min O2 by face mask with adequate spontaneous ventilation    Post pain: adequate analgesia    Post assessment: no apparent anesthetic complications    Post vital signs: stable    Level of consciousness: awake    Nausea/Vomiting: no nausea/vomiting    Complications: none    Transfer of care protocol was followed      Last vitals:   HR: 79 bpm  BP: 137/69 mmHg  SpO2: 100%    Visit Vitals  BP (!) 145/80 (BP Location: Right arm, Patient Position: Lying)   Pulse 62   Temp 36.6 °C (97.8 °F) (Oral)   Resp 18   Ht 5' 5" (1.651 m)   Wt 108.9 kg (240 lb)   LMP 06/26/2018   SpO2 99%   Breastfeeding No   BMI 39.94 kg/m²     "

## 2022-06-15 VITALS
HEART RATE: 69 BPM | TEMPERATURE: 99 F | RESPIRATION RATE: 17 BRPM | SYSTOLIC BLOOD PRESSURE: 99 MMHG | BODY MASS INDEX: 39.08 KG/M2 | WEIGHT: 234.56 LBS | OXYGEN SATURATION: 97 % | HEIGHT: 65 IN | DIASTOLIC BLOOD PRESSURE: 51 MMHG

## 2022-06-15 PROBLEM — F32.A DEPRESSION: Chronic | Status: ACTIVE | Noted: 2019-01-28

## 2022-06-15 PROBLEM — J96.02 ACUTE HYPERCAPNIC RESPIRATORY FAILURE: Status: ACTIVE | Noted: 2022-06-15

## 2022-06-15 PROBLEM — M10.9 GOUT: Chronic | Status: ACTIVE | Noted: 2022-06-15

## 2022-06-15 PROBLEM — M50.30 DDD (DEGENERATIVE DISC DISEASE), CERVICAL: Chronic | Status: ACTIVE | Noted: 2018-05-17

## 2022-06-15 PROBLEM — K21.9 GERD (GASTROESOPHAGEAL REFLUX DISEASE): Chronic | Status: ACTIVE | Noted: 2019-03-27

## 2022-06-15 PROBLEM — G89.29 CHRONIC PAIN: Chronic | Status: ACTIVE | Noted: 2018-12-27

## 2022-06-15 PROBLEM — Z90.3 HISTORY OF SLEEVE GASTRECTOMY: Chronic | Status: ACTIVE | Noted: 2019-08-28

## 2022-06-15 PROBLEM — M51.34 DDD (DEGENERATIVE DISC DISEASE), THORACIC: Chronic | Status: ACTIVE | Noted: 2018-05-17

## 2022-06-15 PROBLEM — M51.36 DDD (DEGENERATIVE DISC DISEASE), LUMBAR: Chronic | Status: ACTIVE | Noted: 2018-06-13

## 2022-06-15 PROBLEM — M10.9 GOUT: Status: ACTIVE | Noted: 2022-06-15

## 2022-06-15 PROBLEM — J96.02 ACUTE HYPERCAPNIC RESPIRATORY FAILURE: Status: RESOLVED | Noted: 2022-06-15 | Resolved: 2022-06-15

## 2022-06-15 PROBLEM — Z98.890 S/P PANNICULECTOMY: Chronic | Status: ACTIVE | Noted: 2022-06-15

## 2022-06-15 PROBLEM — J45.909 ASTHMA: Chronic | Status: ACTIVE | Noted: 2019-08-26

## 2022-06-15 PROBLEM — Z98.890 S/P PANNICULECTOMY: Status: ACTIVE | Noted: 2022-06-15

## 2022-06-15 PROBLEM — Z91.199 NONCOMPLIANCE WITH CPAP TREATMENT: Chronic | Status: ACTIVE | Noted: 2022-06-15

## 2022-06-15 PROBLEM — M51.369 DDD (DEGENERATIVE DISC DISEASE), LUMBAR: Chronic | Status: ACTIVE | Noted: 2018-06-13

## 2022-06-15 PROBLEM — Z98.890 S/P PANNICULECTOMY: Status: ACTIVE | Noted: 2022-06-14

## 2022-06-15 LAB
ALBUMIN SERPL BCP-MCNC: 3.3 G/DL (ref 3.5–5.2)
ALBUMIN SERPL BCP-MCNC: 3.7 G/DL (ref 3.5–5.2)
ALLENS TEST: ABNORMAL
ALLENS TEST: ABNORMAL
ALP SERPL-CCNC: 53 U/L (ref 55–135)
ALP SERPL-CCNC: 57 U/L (ref 55–135)
ALT SERPL W/O P-5'-P-CCNC: 12 U/L (ref 10–44)
ALT SERPL W/O P-5'-P-CCNC: 15 U/L (ref 10–44)
ANION GAP SERPL CALC-SCNC: 10 MMOL/L (ref 8–16)
ANION GAP SERPL CALC-SCNC: 15 MMOL/L (ref 8–16)
AST SERPL-CCNC: 12 U/L (ref 10–40)
AST SERPL-CCNC: 15 U/L (ref 10–40)
BASOPHILS # BLD AUTO: 0.02 K/UL (ref 0–0.2)
BASOPHILS NFR BLD: 0.2 % (ref 0–1.9)
BILIRUB SERPL-MCNC: 0.5 MG/DL (ref 0.1–1)
BILIRUB SERPL-MCNC: 0.7 MG/DL (ref 0.1–1)
BUN SERPL-MCNC: 13 MG/DL (ref 6–20)
BUN SERPL-MCNC: 13 MG/DL (ref 6–20)
CALCIUM SERPL-MCNC: 9.3 MG/DL (ref 8.7–10.5)
CALCIUM SERPL-MCNC: 9.4 MG/DL (ref 8.7–10.5)
CHLORIDE SERPL-SCNC: 104 MMOL/L (ref 95–110)
CHLORIDE SERPL-SCNC: 104 MMOL/L (ref 95–110)
CO2 SERPL-SCNC: 22 MMOL/L (ref 23–29)
CO2 SERPL-SCNC: 22 MMOL/L (ref 23–29)
CREAT SERPL-MCNC: 0.8 MG/DL (ref 0.5–1.4)
CREAT SERPL-MCNC: 0.8 MG/DL (ref 0.5–1.4)
D DIMER PPP IA.FEU-MCNC: 1.18 MG/L FEU
DELSYS: ABNORMAL
DELSYS: ABNORMAL
DIFFERENTIAL METHOD: ABNORMAL
EOSINOPHIL # BLD AUTO: 0 K/UL (ref 0–0.5)
EOSINOPHIL NFR BLD: 0 % (ref 0–8)
EP: 4
ERYTHROCYTE [DISTWIDTH] IN BLOOD BY AUTOMATED COUNT: 13.2 % (ref 11.5–14.5)
EST. GFR  (AFRICAN AMERICAN): >60 ML/MIN/1.73 M^2
EST. GFR  (AFRICAN AMERICAN): >60 ML/MIN/1.73 M^2
EST. GFR  (NON AFRICAN AMERICAN): >60 ML/MIN/1.73 M^2
EST. GFR  (NON AFRICAN AMERICAN): >60 ML/MIN/1.73 M^2
FIO2: 21
GLUCOSE SERPL-MCNC: 190 MG/DL (ref 70–110)
GLUCOSE SERPL-MCNC: 271 MG/DL (ref 70–110)
HCO3 UR-SCNC: 24.8 MMOL/L (ref 24–28)
HCO3 UR-SCNC: 26.3 MMOL/L (ref 24–28)
HCT VFR BLD AUTO: 37.6 % (ref 37–48.5)
HGB BLD-MCNC: 12.2 G/DL (ref 12–16)
IMM GRANULOCYTES # BLD AUTO: 0.04 K/UL (ref 0–0.04)
IMM GRANULOCYTES NFR BLD AUTO: 0.4 % (ref 0–0.5)
IP: 16
LYMPHOCYTES # BLD AUTO: 0.4 K/UL (ref 1–4.8)
LYMPHOCYTES NFR BLD: 3.6 % (ref 18–48)
MAGNESIUM SERPL-MCNC: 1.7 MG/DL (ref 1.6–2.6)
MAGNESIUM SERPL-MCNC: 1.9 MG/DL (ref 1.6–2.6)
MCH RBC QN AUTO: 31.4 PG (ref 27–31)
MCHC RBC AUTO-ENTMCNC: 32.4 G/DL (ref 32–36)
MCV RBC AUTO: 97 FL (ref 82–98)
MODE: ABNORMAL
MONOCYTES # BLD AUTO: 0.2 K/UL (ref 0.3–1)
MONOCYTES NFR BLD: 1.8 % (ref 4–15)
NEUTROPHILS # BLD AUTO: 9.5 K/UL (ref 1.8–7.7)
NEUTROPHILS NFR BLD: 94 % (ref 38–73)
NRBC BLD-RTO: 0 /100 WBC
PCO2 BLDA: 56.2 MMHG (ref 35–45)
PCO2 BLDA: 60.5 MMHG (ref 35–45)
PH SMN: 7.25 [PH] (ref 7.35–7.45)
PH SMN: 7.25 [PH] (ref 7.35–7.45)
PHOSPHATE SERPL-MCNC: 3.4 MG/DL (ref 2.7–4.5)
PLATELET # BLD AUTO: 202 K/UL (ref 150–450)
PMV BLD AUTO: 11.4 FL (ref 9.2–12.9)
PO2 BLDA: 35 MMHG (ref 40–60)
PO2 BLDA: 39 MMHG (ref 40–60)
POC BE: -1 MMOL/L
POC BE: -2 MMOL/L
POC SATURATED O2: 57 % (ref 95–100)
POC SATURATED O2: 65 % (ref 95–100)
POC TCO2: 26 MMOL/L (ref 24–29)
POC TCO2: 28 MMOL/L (ref 24–29)
POCT GLUCOSE: 183 MG/DL (ref 70–110)
POCT GLUCOSE: 212 MG/DL (ref 70–110)
POCT GLUCOSE: 226 MG/DL (ref 70–110)
POTASSIUM SERPL-SCNC: 3.6 MMOL/L (ref 3.5–5.1)
POTASSIUM SERPL-SCNC: 4.1 MMOL/L (ref 3.5–5.1)
PROT SERPL-MCNC: 6.9 G/DL (ref 6–8.4)
PROT SERPL-MCNC: 7.4 G/DL (ref 6–8.4)
RBC # BLD AUTO: 3.88 M/UL (ref 4–5.4)
SAMPLE: ABNORMAL
SAMPLE: ABNORMAL
SITE: ABNORMAL
SITE: ABNORMAL
SODIUM SERPL-SCNC: 136 MMOL/L (ref 136–145)
SODIUM SERPL-SCNC: 141 MMOL/L (ref 136–145)
TROPONIN I SERPL DL<=0.01 NG/ML-MCNC: <0.006 NG/ML (ref 0–0.03)
WBC # BLD AUTO: 10.05 K/UL (ref 3.9–12.7)

## 2022-06-15 PROCEDURE — G0378 HOSPITAL OBSERVATION PER HR: HCPCS

## 2022-06-15 PROCEDURE — 25000003 PHARM REV CODE 250: Performed by: NURSE PRACTITIONER

## 2022-06-15 PROCEDURE — 96372 THER/PROPH/DIAG INJ SC/IM: CPT | Performed by: NURSE PRACTITIONER

## 2022-06-15 PROCEDURE — 82803 BLOOD GASES ANY COMBINATION: CPT

## 2022-06-15 PROCEDURE — 94660 CPAP INITIATION&MGMT: CPT

## 2022-06-15 PROCEDURE — 80053 COMPREHEN METABOLIC PANEL: CPT | Performed by: EMERGENCY MEDICINE

## 2022-06-15 PROCEDURE — 83735 ASSAY OF MAGNESIUM: CPT | Mod: 91 | Performed by: NURSE PRACTITIONER

## 2022-06-15 PROCEDURE — 99220 PR INITIAL OBSERVATION CARE,LEVL III: ICD-10-PCS | Mod: ,,, | Performed by: HOSPITALIST

## 2022-06-15 PROCEDURE — 36415 COLL VENOUS BLD VENIPUNCTURE: CPT | Performed by: HOSPITALIST

## 2022-06-15 PROCEDURE — 99220 PR INITIAL OBSERVATION CARE,LEVL III: CPT | Mod: ,,, | Performed by: HOSPITALIST

## 2022-06-15 PROCEDURE — 85379 FIBRIN DEGRADATION QUANT: CPT | Performed by: HOSPITALIST

## 2022-06-15 PROCEDURE — 36415 COLL VENOUS BLD VENIPUNCTURE: CPT | Performed by: NURSE PRACTITIONER

## 2022-06-15 PROCEDURE — 80053 COMPREHEN METABOLIC PANEL: CPT | Mod: 91 | Performed by: NURSE PRACTITIONER

## 2022-06-15 PROCEDURE — 83735 ASSAY OF MAGNESIUM: CPT | Performed by: EMERGENCY MEDICINE

## 2022-06-15 PROCEDURE — 85025 COMPLETE CBC W/AUTO DIFF WBC: CPT | Performed by: NURSE PRACTITIONER

## 2022-06-15 PROCEDURE — 99900035 HC TECH TIME PER 15 MIN (STAT)

## 2022-06-15 PROCEDURE — 63600175 PHARM REV CODE 636 W HCPCS: Performed by: NURSE PRACTITIONER

## 2022-06-15 PROCEDURE — 84100 ASSAY OF PHOSPHORUS: CPT | Performed by: EMERGENCY MEDICINE

## 2022-06-15 PROCEDURE — 99283 EMERGENCY DEPT VISIT LOW MDM: CPT | Mod: ,,, | Performed by: NURSE PRACTITIONER

## 2022-06-15 PROCEDURE — 99283 PR EMERGENCY DEPT VISIT,LEVEL III: ICD-10-PCS | Mod: ,,, | Performed by: NURSE PRACTITIONER

## 2022-06-15 PROCEDURE — 84484 ASSAY OF TROPONIN QUANT: CPT | Performed by: HOSPITALIST

## 2022-06-15 RX ORDER — ACETAMINOPHEN 500 MG
1000 TABLET ORAL EVERY 8 HOURS
Status: DISCONTINUED | OUTPATIENT
Start: 2022-06-15 | End: 2022-06-15

## 2022-06-15 RX ORDER — IPRATROPIUM BROMIDE AND ALBUTEROL SULFATE 2.5; .5 MG/3ML; MG/3ML
3 SOLUTION RESPIRATORY (INHALATION) EVERY 4 HOURS PRN
Status: DISCONTINUED | OUTPATIENT
Start: 2022-06-15 | End: 2022-06-15 | Stop reason: HOSPADM

## 2022-06-15 RX ORDER — TALC
6 POWDER (GRAM) TOPICAL NIGHTLY PRN
Status: DISCONTINUED | OUTPATIENT
Start: 2022-06-15 | End: 2022-06-15 | Stop reason: HOSPADM

## 2022-06-15 RX ORDER — ALLOPURINOL 100 MG/1
300 TABLET ORAL 2 TIMES DAILY
Status: DISCONTINUED | OUTPATIENT
Start: 2022-06-15 | End: 2022-06-15 | Stop reason: HOSPADM

## 2022-06-15 RX ORDER — ENOXAPARIN SODIUM 100 MG/ML
40 INJECTION SUBCUTANEOUS EVERY 24 HOURS
Status: DISCONTINUED | OUTPATIENT
Start: 2022-06-15 | End: 2022-06-15 | Stop reason: HOSPADM

## 2022-06-15 RX ORDER — CHOLECALCIFEROL (VITAMIN D3) 25 MCG
1000 TABLET ORAL DAILY
Status: DISCONTINUED | OUTPATIENT
Start: 2022-06-15 | End: 2022-06-15 | Stop reason: HOSPADM

## 2022-06-15 RX ORDER — ACETAMINOPHEN 325 MG/1
650 TABLET ORAL EVERY 4 HOURS PRN
Status: DISCONTINUED | OUTPATIENT
Start: 2022-06-15 | End: 2022-06-15 | Stop reason: HOSPADM

## 2022-06-15 RX ORDER — SODIUM CHLORIDE 0.9 % (FLUSH) 0.9 %
10 SYRINGE (ML) INJECTION EVERY 12 HOURS PRN
Status: DISCONTINUED | OUTPATIENT
Start: 2022-06-15 | End: 2022-06-15 | Stop reason: HOSPADM

## 2022-06-15 RX ORDER — ATORVASTATIN CALCIUM 20 MG/1
20 TABLET, FILM COATED ORAL DAILY
Status: DISCONTINUED | OUTPATIENT
Start: 2022-06-15 | End: 2022-06-15 | Stop reason: HOSPADM

## 2022-06-15 RX ORDER — ONDANSETRON 2 MG/ML
4 INJECTION INTRAMUSCULAR; INTRAVENOUS EVERY 8 HOURS PRN
Status: DISCONTINUED | OUTPATIENT
Start: 2022-06-15 | End: 2022-06-15 | Stop reason: HOSPADM

## 2022-06-15 RX ORDER — CLINDAMYCIN HYDROCHLORIDE 150 MG/1
300 CAPSULE ORAL EVERY 8 HOURS
Status: DISCONTINUED | OUTPATIENT
Start: 2022-06-15 | End: 2022-06-15 | Stop reason: HOSPADM

## 2022-06-15 RX ORDER — IBUPROFEN 200 MG
16 TABLET ORAL
Status: DISCONTINUED | OUTPATIENT
Start: 2022-06-15 | End: 2022-06-15 | Stop reason: HOSPADM

## 2022-06-15 RX ORDER — LANOLIN ALCOHOL/MO/W.PET/CERES
1000 CREAM (GRAM) TOPICAL EVERY MORNING
Status: DISCONTINUED | OUTPATIENT
Start: 2022-06-15 | End: 2022-06-15 | Stop reason: HOSPADM

## 2022-06-15 RX ORDER — INSULIN ASPART 100 [IU]/ML
0-5 INJECTION, SOLUTION INTRAVENOUS; SUBCUTANEOUS
Status: DISCONTINUED | OUTPATIENT
Start: 2022-06-15 | End: 2022-06-15 | Stop reason: HOSPADM

## 2022-06-15 RX ORDER — OXYCODONE AND ACETAMINOPHEN 5; 325 MG/1; MG/1
1 TABLET ORAL EVERY 6 HOURS PRN
Status: DISCONTINUED | OUTPATIENT
Start: 2022-06-15 | End: 2022-06-15 | Stop reason: HOSPADM

## 2022-06-15 RX ORDER — FLUOXETINE 20 MG/5ML
20 SOLUTION ORAL DAILY
Status: DISCONTINUED | OUTPATIENT
Start: 2022-06-15 | End: 2022-06-15 | Stop reason: HOSPADM

## 2022-06-15 RX ORDER — GLUCAGON 1 MG
1 KIT INJECTION
Status: DISCONTINUED | OUTPATIENT
Start: 2022-06-15 | End: 2022-06-15 | Stop reason: HOSPADM

## 2022-06-15 RX ORDER — NALOXONE HCL 0.4 MG/ML
0.02 VIAL (ML) INJECTION
Status: DISCONTINUED | OUTPATIENT
Start: 2022-06-15 | End: 2022-06-15 | Stop reason: HOSPADM

## 2022-06-15 RX ORDER — SODIUM CHLORIDE 0.9 % (FLUSH) 0.9 %
10 SYRINGE (ML) INJECTION
Status: DISCONTINUED | OUTPATIENT
Start: 2022-06-15 | End: 2022-06-15 | Stop reason: HOSPADM

## 2022-06-15 RX ORDER — PANTOPRAZOLE SODIUM 40 MG/1
40 TABLET, DELAYED RELEASE ORAL DAILY
Refills: 3 | Status: DISCONTINUED | OUTPATIENT
Start: 2022-06-15 | End: 2022-06-15 | Stop reason: HOSPADM

## 2022-06-15 RX ORDER — IBUPROFEN 200 MG
24 TABLET ORAL
Status: DISCONTINUED | OUTPATIENT
Start: 2022-06-15 | End: 2022-06-15 | Stop reason: HOSPADM

## 2022-06-15 RX ADMIN — CLINDAMYCIN HYDROCHLORIDE 300 MG: 150 CAPSULE ORAL at 06:06

## 2022-06-15 RX ADMIN — ALLOPURINOL 300 MG: 100 TABLET ORAL at 08:06

## 2022-06-15 RX ADMIN — INSULIN ASPART 2 UNITS: 100 INJECTION, SOLUTION INTRAVENOUS; SUBCUTANEOUS at 08:06

## 2022-06-15 RX ADMIN — ENOXAPARIN SODIUM 40 MG: 100 INJECTION SUBCUTANEOUS at 04:06

## 2022-06-15 RX ADMIN — Medication 1000 UNITS: at 08:06

## 2022-06-15 RX ADMIN — THERA TABS 1 TABLET: TAB at 08:06

## 2022-06-15 RX ADMIN — PANTOPRAZOLE SODIUM 40 MG: 40 TABLET, DELAYED RELEASE ORAL at 08:06

## 2022-06-15 RX ADMIN — ATORVASTATIN CALCIUM 20 MG: 20 TABLET, FILM COATED ORAL at 08:06

## 2022-06-15 RX ADMIN — CLINDAMYCIN HYDROCHLORIDE 300 MG: 150 CAPSULE ORAL at 04:06

## 2022-06-15 RX ADMIN — FLUOXETINE HYDROCHLORIDE 20 MG: 20 SOLUTION ORAL at 08:06

## 2022-06-15 RX ADMIN — OXYCODONE HYDROCHLORIDE AND ACETAMINOPHEN 1 TABLET: 5; 325 TABLET ORAL at 06:06

## 2022-06-15 RX ADMIN — CYANOCOBALAMIN TAB 1000 MCG 1000 MCG: 1000 TAB at 06:06

## 2022-06-15 NOTE — HOSPITAL COURSE
Her lack of wheezing on physical exam that would suggest asthma exacerbation, and presence of dyspnea, which is inconsistent with hypoxia secondary to primary hypercapnia, made etiology of her presentation unclear. Troponin was normal. D-dimer was elevated at 1.18 mg/L. Ventilation-perfusion scan was low probability for pulmonary embolism. She felt well. She was discharged home.

## 2022-06-15 NOTE — ASSESSMENT & PLAN NOTE
Patient with Hypercapnic Respiratory failure which is Acute.  she is not on home oxygen. Supplemental oxygen was provided and noted-   Oxygen Concentration (%):  [30] 30    VBG  Recent Labs   Lab 06/15/22  0118   PH 7.253*   PO2 39*   PCO2 56.2*   HCO3 24.8   BE -2     Signs/symptoms of respiratory failure include- tachypnea, increased work of breathing and lethargy. Contributing diagnoses includes - hypoventilation Labs and images were reviewed. Patient Has recent ABG, which has been reviewed. Will treat underlying causes and adjust management of respiratory failure as follows-     Patient with PMHx significant for PADDY not on home CPAP, s/p panniculectomy earlier today, who presented couple of hours after discharge. She reports developing SOB after getting home she got home noting her oxygen saturation was 80-85%. She found to be somnolent with respiratory acidosis and acute respiratory failure. Initial VBG PH 7.25, PCO2 64.6. she was placed on BiPAP with significant improvement of the acidemia and she is fully oriented and conversional. Patient is currently satting high 90s on room air and has zero signs of CO2 narcosis. CCM was consulted in the ED and recs are as follows:     -- Bipap prn; currently doing well on room air  --  about the benefit of CPAP QHS (h/o PADDY), but pt declined   -- Avoid opioid or BZ, use with caution if needed

## 2022-06-15 NOTE — ASSESSMENT & PLAN NOTE
Pt s/p panniculectomy 6/14 with Dr. Whaley  -Keep surgical sites clean and dry.   -Record drain outputs daily and keep drains milked.   -Wear abdominal binder daily   -Continue oral clindamycin x7 days  -Has clinic f/u in 1 week

## 2022-06-15 NOTE — SUBJECTIVE & OBJECTIVE
Interval History: She looks sleepy but says that it is because people have been coming in and out since she was admitted and she has not been able to rest. I told her how I talked to the Critical Care Medicine team's attending physician this morning about the unclear etiology of her presentation and concern for pulmonary embolism. I told her I would follow up the V/Q scan results as they were not yet reported when I saw her. I told her I would discharge her if results were normal.     Review of Systems   Constitutional:  Negative for chills and fever.   Gastrointestinal:  Negative for nausea and vomiting.   Neurological:  Negative for seizures and syncope.   Objective:     Vital Signs (Most Recent):  Temp: 98.8 °F (37.1 °C) (06/15/22 1225)  Pulse: 69 (06/15/22 1225)  Resp: 15 (06/15/22 1225)  BP: 124/64 (06/15/22 1225)  SpO2: 97 % (06/15/22 1225) Vital Signs (24h Range):  Temp:  [97.2 °F (36.2 °C)-98.8 °F (37.1 °C)] 98.8 °F (37.1 °C)  Pulse:  [57-85] 69  Resp:  [10-21] 15  SpO2:  [94 %-100 %] 97 %  BP: (118-172)/(64-82) 124/64     Weight: 106.4 kg (234 lb 9.1 oz)  Body mass index is 39.03 kg/m².    Intake/Output Summary (Last 24 hours) at 6/15/2022 1648  Last data filed at 6/15/2022 0430  Gross per 24 hour   Intake 500 ml   Output 210 ml   Net 290 ml      Physical Exam  Vitals and nursing note reviewed.   Constitutional:       General: She is not in acute distress.     Appearance: She is well-developed. She is obese. She is not diaphoretic.      Comments: Sleepy but stays awake as long as I am talking.   Pulmonary:      Effort: Pulmonary effort is normal. No tachypnea, accessory muscle usage or respiratory distress.   Neurological:      Mental Status: She is alert and oriented to person, place, and time.      Motor: No tremor or seizure activity.   Psychiatric:         Mood and Affect: Mood and affect normal.         Speech: Speech normal.         Behavior: Behavior is cooperative.       Computed MELD-Na score  unavailable. Necessary lab results were not found in the last year.  Computed MELD score unavailable. Necessary lab results were not found in the last year.    Significant Labs:  CBC:  Recent Labs   Lab 06/14/22  2232 06/15/22  0520   WBC 11.18 10.05   HGB 12.7 12.2   HCT 39.7 37.6    202     CMP:  Recent Labs   Lab 06/15/22  0005 06/15/22  0520    136   K 3.6 4.1    104   CO2 22* 22*   * 271*   BUN 13 13   CREATININE 0.8 0.8   CALCIUM 9.3 9.4   PROT 7.4 6.9   ALBUMIN 3.7 3.3*   BILITOT 0.7 0.5   ALKPHOS 57 53*   AST 15 12   ALT 15 12   ANIONGAP 15 10   EGFRNONAA >60.0 >60.0     X-Ray Chest AP Portable 6/15/22: FINDINGS:   Single chest view is submitted.  There is diminished depth of inspiration and there is mild rotation, when accounting for these factors the cardiomediastinal silhouette appears stable.  Aortic atherosclerotic change noted.   Accentuated pulmonary bronchovascular markings consistent with diminished depth of inspiration noted.  Asymmetric density on the left is thought due to soft tissue attenuation and asymmetric due to positioning.  There is no evidence for superimposed confluent infiltrate or consolidation, significant pleural effusion or pneumothorax.  The osseous structures appear intact.  Chronic change noted.   Impression:  Diminished depth of inspiration without additional radiographic evidence for acute intrathoracic process.   NM Lung Scan Perfusion Particulate 6/15/22: FINDINGS:   Perfusion: Adequate perfusion throughout both lungs.  No definite moderate or large peripheral wedge-shaped defect.  Impression:  Overall findings represent low probability of pulmonary embolism.

## 2022-06-15 NOTE — HPI
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 She has a CPAP at home but does not really use it. She lives in [facility]. She is . Her primary care physician is Dr. Clarisse Richardson.    She underwent elective panniculectomy at [facility] on 6/14/2022. She was given oxycodone-acetaminophen prior to discharge at 19:45. When she arrived home, she became short of breath and oxygen saturation was in the 80s. Her  called an [facility] triage nurse who advised calling emergency medical services. She returned to [facility]. In the emergency department, she was somnolent initially. She had respiratory acidosis and was put on BiPAP for several hours. She was weaned to room air. Chest X-ray showed no acute process. She was also given intravenous steroids and albuterol-ipratropium nebulizer treatment. Critical Care Medicine evaluated her and recommended BiPAP as needed and observation in a stepdown unit. She was admitted to Hospital Medicine Team AJuan A

## 2022-06-15 NOTE — DISCHARGE SUMMARY
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 She has a CPAP at home but does not really use it. She lives in [facility]. She is . Her primary care physician is Dr. Clarisse Richardson.    She underwent elective panniculectomy at [facility] on 6/14/2022. She was given oxycodone-acetaminophen prior to discharge at 19:45. When she arrived home, she became short of breath and oxygen saturation was in the 80s. Her  called an [facility] triage nurse who advised calling emergency medical services. She returned to [facility]. In the emergency department, she was somnolent initially. She had respiratory acidosis and was put on BiPAP for several hours. She was weaned to room air. Chest X-ray showed no acute process. She was also given intravenous steroids and albuterol-ipratropium nebulizer treatment. Critical Care Medicine evaluated her and recommended BiPAP as needed and observation in a  stepdown unit. She was admitted to Hospital Medicine Team A.         Hospital Course:   Her lack of wheezing on physical exam that would suggest asthma exacerbation, and presence of dyspnea, which is inconsistent with hypoxia secondary to primary hypercapnia, made etiology of her presentation unclear. Troponin was normal. D-dimer was elevated at 1.18 mg/L. Ventilation-perfusion scan was low probability for pulmonary embolism. She felt well. She was discharged home.       Goals of Care Treatment Preferences:  Code Status: Full Code    Living Will: Yes              Consults:   Consults (From admission, onward)        Status Ordering Provider     Inpatient consult to Critical Care Medicine  Once        Provider:  (Not yet assigned)    Completed KAIN LOO        Final Active Diagnoses:    Diagnosis Date Noted POA    BMI 39.0-39.9,adult [Z68.39] 06/15/2022 Not Applicable     Chronic    Gout [M10.9] 06/15/2022 Yes     Chronic    Noncompliance with CPAP treatment [Z91.14] 06/15/2022 Not Applicable     Chronic    S/P panniculectomy [Z98.890] 06/14/2022 Not Applicable    History of sleeve gastrectomy [Z90.3] 08/28/2019 Not Applicable     Chronic    GERD (gastroesophageal reflux disease) [K21.9] 03/27/2019 Yes     Chronic    Depression [F32.A] 01/28/2019 Yes     Chronic    Chronic pain [G89.29] 12/27/2018 Yes     Chronic    DDD (degenerative disc disease), lumbar [M51.36] 06/13/2018 Yes     Chronic    DDD (degenerative disc disease), cervical [M50.30] 05/17/2018 Yes     Chronic    DDD (degenerative disc disease), thoracic [M51.34] 05/17/2018 Yes     Chronic    Mild intermittent asthma without complication [J45.20]  Yes     Chronic    Chronic, continuous use of opioids [F11.90] 05/05/2016 Yes     Chronic    History of total left knee replacement [Z96.652] 06/19/2014 Not Applicable     Chronic    Hyperlipemia [E78.5] 10/01/2012 Yes     Chronic    Type 2 diabetes mellitus without complication, without  long-term current use of insulin [E11.9] 08/14/2012 Yes     Chronic    PADDY (obstructive sleep apnea) [G47.33]  Yes     Chronic    History of total right knee replacement [Z96.651] 07/13/2010 Not Applicable     Chronic      Problems Resolved During this Admission:    Diagnosis Date Noted Date Resolved POA    PRINCIPAL PROBLEM:  Acute hypercapnic respiratory failure [J96.02] 06/15/2022 06/15/2022 Yes       Discharged Condition: good    Disposition: Home or Self Care    Follow Up:   Follow-up Information     Stacey Rowland DPM Follow up on 6/20/2022.    Specialty: Podiatry  Why: 9 AM  Contact information:  [address]  [address]  [facility] [address]  [phone]             Rhett Gray MD Follow up on 6/22/2022.    Specialty: Plastic Surgery  Why: 10 AM  Contact information:  [address]  [facility] [address]  [phone]                       Patient Instructions:      Diet diabetic     Other restrictions (specify):   Order Comments: After 48 hours from surgery shower normally and daily.     Leave dressing on - Keep it clean, dry, and intact until clinic visit   Order Comments: Keep surgical sites clean and dry. Record drain outputs daily and keep drains milked.     Activity as tolerated       Significant Diagnostic Studies:   X-Ray Chest AP Portable 6/15/22: FINDINGS:   Single chest view is submitted.  There is diminished depth of inspiration and there is mild rotation, when accounting for these factors the cardiomediastinal silhouette appears stable.  Aortic atherosclerotic change noted.   Accentuated pulmonary bronchovascular markings consistent with diminished depth of inspiration noted.  Asymmetric density on the left is thought due to soft tissue attenuation and asymmetric due to positioning.  There is no evidence for superimposed confluent infiltrate or consolidation, significant pleural effusion or pneumothorax.  The osseous structures appear intact.  Chronic change noted.    Impression:  Diminished depth of inspiration without additional radiographic evidence for acute intrathoracic process.   NM Lung Scan Perfusion Particulate 6/15/22: FINDINGS:   Perfusion: Adequate perfusion throughout both lungs.  No definite moderate or large peripheral wedge-shaped defect.  Impression:  Overall findings represent low probability of pulmonary embolism.       Medications:  Reconciled Home Medications:      Medication List      CONTINUE taking these medications    albuterol 90 mcg/actuation inhaler  Commonly known as: VENTOLIN HFA  INHALE TWO PUFFS INTO LUNGS EVERY 4 TO 6 HOURS AS NEEDED FOR SHORTNESS OF BREATH AND FOR WHEEZING     allopurinoL 300 MG tablet  Commonly known as: ZYLOPRIM  Take 1 tablet (300 mg total) by mouth 2 (two) times daily.     atorvastatin 20 MG tablet  Commonly known as: LIPITOR  Take 1 tablet (20 mg total) by mouth once daily.     b complex vitamins tablet  Take 1 tablet by mouth every other day.     ISIAH-CITRATE ORAL  Take 500 mg by mouth 2 (two) times daily.     clindamycin 300 MG capsule  Commonly known as: CLEOCIN  Take 1 capsule (300 mg total) by mouth every 8 (eight) hours. for 7 days     colchicine 0.6 mg Cap  Commonly known as: MITIGARE  Take 1 capsule (0.6 mg total) by mouth daily as needed (flare up).     cyanocobalamin 1000 MCG tablet  Commonly known as: VITAMIN B-12  Take 100 mcg by mouth every morning.     diclofenac sodium 1 % Gel  Commonly known as: VOLTAREN  Apply 2 g topically 4 (four) times daily as needed. To painful area on the feet.     FLUoxetine 20 mg/5 mL (4 mg/mL) solution  Commonly known as: PROZAC  Take 5 mLs (20 mg total) by mouth once daily.     fluticasone propionate 50 mcg/actuation nasal spray  Commonly known as: FLONASE  1 spray (50 mcg total) by Each Nostril route 2 (two) times daily as needed for Rhinitis.     indomethacin 50 MG capsule  Commonly known as: INDOCIN  TAKE 1 CAPSULE BY MOUTH 2 TIMES DAILY WITH MEALS     LIDOcaine 5 % Oint  ointment  Commonly known as: XYLOCAINE  Apply topically as needed.     multivit-iron-min-folic acid 3,500-18-0.4 unit-mg-mg Chew  Take 1 tablet by mouth 2 (two) times daily.     nystatin powder  Commonly known as: MYCOSTATIN  Apply topically 2 (two) times daily.     omeprazole 20 MG capsule  Commonly known as: PRILOSEC  Take 1 capsule (20 mg total) by mouth every morning.     oxybutynin 5 MG Tr24  Commonly known as: DITROPAN-XL  Take 1 tablet (5 mg total) by mouth once daily.     * oxyCODONE-acetaminophen  mg per tablet  Commonly known as: PERCOCET  Take 1 tablet by mouth every 8 (eight) hours as needed for Pain.     * oxyCODONE-acetaminophen 5-325 mg per tablet  Commonly known as: PERCOCET  Take 1 tablet by mouth every 6 (six) hours as needed for Pain.     vitamin D 1000 units Tab  Commonly known as: VITAMIN D3  Take 5,000 mg by mouth every morning.         * This list has 2 medication(s) that are the same as other medications prescribed for you. Read the directions carefully, and ask your doctor or other care provider to review them with you.                Indwelling Lines/Drains at time of discharge:   Lines/Drains/Airways     Drain  Duration                Closed/Suction Drain Left;Ventral Hip 15 Fr. -- days         Closed/Suction Drain 06/14/22 1831 Right Hip 15 Fr. <1 day                Time spent on the discharge of patient: 35 minutes         Guicho Laguna MD  Department of Hospital Medicine  [facility] - Intensive Care ([place])

## 2022-06-15 NOTE — PROGRESS NOTES
[facility] - Intensive Care ([address] Medicine  Progress Note    Patient Name: Alivia Thomas  MRN: [id number]  Patient Class: OP- Observation   Admission Date: 6/14/2022  Length of Stay: 0 days  Attending Physician: Guicho Laguna MD  Primary Care Provider: Clarisse Richardson MD        Subjective:     Principal Problem:Acute hypercapnic respiratory failure        HPI:  Alivia Thomas is a 56 year old Black woman with obesity, history of sleeve gastrectomy on 8/28/2019, asthma, obstructive sleep apnea, knee osteoarthritis status post right total knee arthroplasty on 7/13/2010 and left total knee arthroplasty on 6/19/2014, diabetes mellitus type 2 diagnosed in February 2009, hyperlipidemia, gout, gastroesophageal reflux disease, depression, degenerative cervical, thoracic, and lumbar disc disease, thoracolumbar scoliosis, cervical neural foraminal stenosis and radiculopathy, chronic pain on chronic opioids. She has a CPAP at home but does not really use it. She lives in [facility]. She is . Her primary care physician is Dr. Clarisse Richardson.    She underwent elective panniculectomy at [facility] on 6/14/2022. She was given oxycodone-acetaminophen prior to discharge at 19:45. When she arrived home, she became short of breath and oxygen saturation was in the 80s. Her  called an [facility] triage nurse who advised calling emergency medical services. She returned to [facility]. In the emergency department, she was somnolent initially. She had respiratory acidosis and was put on BiPAP for several hours. She was weaned to room air. Chest X-ray showed no acute process. She was also given intravenous steroids and albuterol-ipratropium nebulizer treatment. Critical Care Medicine evaluated her and recommended BiPAP as needed and observation in a stepdown unit. She was admitted to Hospital Medicine Team A.       [facility]  Course:  Her lack of wheezing on physical exam that would suggest asthma exacerbation, and presence of dyspnea, which is inconsistent with hypoxia secondary to primary hypercapnia, made etiology of her presentation unclear. D-dimer was elevated at 1.18 mg/L.      Interval History: She looks sleepy but says that it is because people have been coming in and out since she was admitted and she has not been able to rest. I told her how I talked to the Critical Care Medicine team's attending physician this morning about the unclear etiology of her presentation and concern for pulmonary embolism. I told her I would follow up the V/Q scan results as they were not yet reported when I saw her. I told her I would discharge her if results were normal.     Review of Systems   Constitutional:  Negative for chills and fever.   Gastrointestinal:  Negative for nausea and vomiting.   Neurological:  Negative for seizures and syncope.   Objective:     Vital Signs (Most Recent):  Temp: 98.8 °F (37.1 °C) (06/15/22 1225)  Pulse: 69 (06/15/22 1225)  Resp: 15 (06/15/22 1225)  BP: 124/64 (06/15/22 1225)  SpO2: 97 % (06/15/22 1225) Vital Signs (24h Range):  Temp:  [97.2 °F (36.2 °C)-98.8 °F (37.1 °C)] 98.8 °F (37.1 °C)  Pulse:  [57-85] 69  Resp:  [10-21] 15  SpO2:  [94 %-100 %] 97 %  BP: (118-172)/(64-82) 124/64     Weight: 106.4 kg (234 lb 9.1 oz)  Body mass index is 39.03 kg/m².    Intake/Output Summary (Last 24 hours) at 6/15/2022 1648  Last data filed at 6/15/2022 0430  Gross per 24 hour   Intake 500 ml   Output 210 ml   Net 290 ml      Physical Exam  Vitals and nursing note reviewed.   Constitutional:       General: She is not in acute distress.     Appearance: She is well-developed. She is obese. She is not diaphoretic.      Comments: Sleepy but stays awake as long as I am talking.   Pulmonary:      Effort: Pulmonary effort is normal. No tachypnea, accessory muscle usage or respiratory distress.   Neurological:      Mental Status: She  is alert and oriented to person, place, and time.      Motor: No tremor or seizure activity.   Psychiatric:         Mood and Affect: Mood and affect normal.         Speech: Speech normal.         Behavior: Behavior is cooperative.       Computed MELD-Na score unavailable. Necessary lab results were not found in the last year.  Computed MELD score unavailable. Necessary lab results were not found in the last year.    Significant Labs:  CBC:  Recent Labs   Lab 06/14/22  2232 06/15/22  0520   WBC 11.18 10.05   HGB 12.7 12.2   HCT 39.7 37.6    202     CMP:  Recent Labs   Lab 06/15/22  0005 06/15/22  0520    136   K 3.6 4.1    104   CO2 22* 22*   * 271*   BUN 13 13   CREATININE 0.8 0.8   CALCIUM 9.3 9.4   PROT 7.4 6.9   ALBUMIN 3.7 3.3*   BILITOT 0.7 0.5   ALKPHOS 57 53*   AST 15 12   ALT 15 12   ANIONGAP 15 10   EGFRNONAA >60.0 >60.0     X-Ray Chest AP Portable 6/15/22: FINDINGS:   Single chest view is submitted.  There is diminished depth of inspiration and there is mild rotation, when accounting for these factors the cardiomediastinal silhouette appears stable.  Aortic atherosclerotic change noted.   Accentuated pulmonary bronchovascular markings consistent with diminished depth of inspiration noted.  Asymmetric density on the left is thought due to soft tissue attenuation and asymmetric due to positioning.  There is no evidence for superimposed confluent infiltrate or consolidation, significant pleural effusion or pneumothorax.  The osseous structures appear intact.  Chronic change noted.   Impression:  Diminished depth of inspiration without additional radiographic evidence for acute intrathoracic process.   NM Lung Scan Perfusion Particulate 6/15/22: FINDINGS:   Perfusion: Adequate perfusion throughout both lungs.  No definite moderate or large peripheral wedge-shaped defect.  Impression:  Overall findings represent low probability of pulmonary embolism.        Assessment/Plan:       Gout  -Continue allopurinol daily.    S/P panniculectomy  Pt s/p panniculectomy 6/14 with Dr. Whaley  -Keep surgical sites clean and dry.   -Record drain outputs daily and keep drains milked.   -Wear abdominal binder daily   -Continue oral clindamycin x7 days  -Has clinic f/u in 1 week    BMI 39.0-39.9,adult  Body mass index is 39.03 kg/m². Obesity complicates all aspects of disease management from diagnostic modalities to treatment. Pt s/p sleeve gastrectomy 2019.    GERD (gastroesophageal reflux disease)  -Chronic, stable.  -Continue PPI.    Depression  -Chronic, stable.   -Continue prozac.    Chronic pain  - reviewed, continue percocet PRN.    Hyperlipemia   Patient is chronically on statin.will continue for now. Monitor clinically. Last LDL was   Lab Results   Component Value Date    LDLCALC 75.2 11/08/2021        Type 2 diabetes mellitus without complication, without long-term current use of insulin  -Diet controlled. Glucose 190, likely steroid induced (received IV solumedrol in the ED).  -Hgb A1c 5.9  -LDSSI  -Accuchecks AC/HS  -Diabetic/ cardiac diet.    PADDY (obstructive sleep apnea)  -Patient not compliant with CPAP.      VTE Risk Mitigation (From admission, onward)         Ordered     enoxaparin injection 40 mg  Daily         06/15/22 0314     IP VTE HIGH RISK PATIENT  Once         06/15/22 0314     Place sequential compression device  Until discontinued         06/15/22 0314                Discharge Planning   RYAN: 6/15/2022     Code Status: Full Code   Is the patient medically ready for discharge?: Yes     Discharge Plan A: Home with family                  Guicho Laguna MD  Department of Hospital Medicine   [facility] - Intensive Care ([place])

## 2022-06-15 NOTE — PLAN OF CARE
Patient AOx4. RA. VSS. VANI drain x 2 with sanguinous output. Slight drainage to staples on lower abdomen and dressing changed. Dermabond to midline chest and abdomen with dressing placed. Plan of care reviewed with patient and spouse.      Problem: Adult Inpatient Plan of Care  Goal: Plan of Care Review  Outcome: Ongoing, Progressing  Goal: Patient-Specific Goal (Individualized)  Outcome: Ongoing, Progressing  Goal: Absence of Hospital-Acquired Illness or Injury  Outcome: Ongoing, Progressing  Goal: Optimal Comfort and Wellbeing  Outcome: Ongoing, Progressing

## 2022-06-15 NOTE — PLAN OF CARE
[facility] - Intensive Care ([place] [address])  Initial Discharge Assessment       Primary Care Provider: Clarisse Richardson MD    Admission Diagnosis: Hypercapnia [R06.89]  Hypoxia [R09.02]  Chest pain [R07.9]    Admission Date: 6/14/2022  Expected Discharge Date: 6/16/2022   CM at bedside to discuss discharge planning assessment, met patient  With wife at bedside. Patient lives with wife in a single-story dwelling with no stairs. Patient was independent with ADLs prior to admission. Initial discharge planning completed. Pt does not utilize any DME's at this time.       Discharge Barriers Identified: None    Payor: MEDICARE / Plan: MEDICARE PART A & B / Product Type: Government /     Extended Emergency Contact Information  Primary Emergency Contact: Ba Sanders  Address: [address] [place] [address] [place] [address] [place]  Mobile Phone: [phone]  Relation: Spouse  Preferred language: English  Secondary Emergency Contact: Memo Mueller   [facility]  Home Phone: [phone]  Mobile Phone: [phone]  Relation: Sister  Preferred language: English    Discharge Plan A: Home with family  Discharge Plan B: Home      [facility] - [place], [place] - [address]  [address]  [place] [address]  Phone: [phone] Fax: [phone]      Initial Assessment (most recent)     Adult Discharge Assessment - 06/15/22 1529        Discharge Assessment    Assessment Type Discharge Planning Assessment     Confirmed/corrected address, phone number and insurance Yes     Confirmed Demographics Correct on Facesheet     Source of Information patient     Communicated RYAN with patient/caregiver Date not available/Unable to determine     Reason For Admission acute hypercepnic respiratory failure     Lives With spouse     Facility Arrived From: home     Do you expect to return to your current living situation? Yes     Do you have help at home or someone to help  you manage your care at home? Yes     Who are your caregiver(s) and their phone number(s)? Ba Sierra-[phone]     Prior to hospitilization cognitive status: Alert/Oriented     Current cognitive status: Alert/Oriented     Walking or Climbing Stairs Difficulty none     Dressing/Bathing Difficulty none     Equipment Currently Used at Home none     Readmission within 30 days? Yes     Do you take prescription medications? Yes     Do you have prescription coverage? Yes     Coverage [employer]/medicare part a&b     Is the patient taking medications as prescribed? yes     Who is going to help you get home at discharge? Ba Sierra-[phone]     How do you get to doctors appointments? car, drives self     Are you on dialysis? No     Do you take coumadin? No     Discharge Plan A Home with family     Discharge Plan B Home     DME Needed Upon Discharge  none     Discharge Plan discussed with: Patient;Spouse/sig other     Name(s) and Number(s) Ba Sierra-[phone]     Discharge Barriers Identified None        Relationship/Environment    Name(s) of Who Lives With Patient Ba Sierra-[phone]

## 2022-06-15 NOTE — TELEPHONE ENCOUNTER
Pt had surgery today and had a panniculectomy and sent home from recovery and wife said that pt is not breathing right, Pt O2 sat is 84 and then when she wakes her it goes to 87 she said that she doesn't look like she is struggling but I told her that O2 sat is too low to hang up and call 911.       Reason for Disposition   SEVERE difficulty breathing (e.g., struggling for each breath, speaks in single words)    Protocols used: BREATHING DIFFICULTY-A-AH

## 2022-06-15 NOTE — H&P
[facility] - Int[facility] ([address] Medicine  History & Physical    Patient Name: Alivia Thomas  MRN: [id number]  Patient Class: OP- Observation  Admission Date: 6/14/2022  Attending Physician: Guicho Laguna MD   Primary Care Provider: Clarisse Richardson MD         Patient information was obtained from patient, past medical records and ER records.     Subjective:     Principal Problem:Acute hypercapnic respiratory failure    Chief Complaint:   Chief Complaint   Patient presents with    Shortness of Breath     Pt had gastric surgery today, c/o sob during hospital stay but d/c home. Pt given 1 breathing tx with EMS, vitals improved but pt states she feels worse. Sating 78% on RA, pt arrives on 6 L NRB sating 100%. Pot denies taking pain meds today, has not filled prescriptions, pt lethargic upon arrival        HPI: Alivia Thomas is a 56 y.o. female with a PMHx of asthma, PADDY, obesity, HTN, HLD, DM2, and GERD who presents to the ED who presents to the ED via EMS for shortness of breath. The patient underwent panniculectomy on 6/14. She was discharged from the PACU at 7:45p and per the patient, she received percocet prior to being discharged home. Once the patient arrived home, she became short of breath with sats in the mid 80s on home pulse ox. The patient's  called an [facility] nurse triage line who instructed the  to call 911. Per ED notes patient was initially somnolent but now is alert and answering questions. The patient notes abdominal pain near her incision but states it is tolerable currently. Describes the pain as constant, aching. Reports movement makes the pain worse and resting improves the pain. The patient denies any cough, chest pain, nausea, vomiting, diarrhea, fever, or chills.     In the ED, patient initially found to have respiratory acidosis requiring bipap, which improved after several hours. She was weaned to room air. CBC unremarkable. Bicarb 22. Glucose  190. CXR with diminished depth of inspiration without additional radiographic evidence for acute intrathoracic process. The patient received IV steroids and duo nebs. [facility] evaluated the patient in the ED and recommended Bipap PRN and observation on a stepdown unit.       Past Medical History:   Diagnosis Date    Allergy     Anemia     Asthma     Bilateral shoulder bursitis     Cervical stenosis of spine     Chronic pain     DDD (degenerative disc disease), cervical     Depression 1/28/2019    Diabetes mellitus type II     DJD (degenerative joint disease) of knee 6/19/2014    Facet arthritis of lumbar region 12/17/2015    Facet syndrome 12/17/2015    GERD (gastroesophageal reflux disease)     Heartburn     Hyperlipidemia     Hypertension     Morbid obesity     Neuromuscular disorder     PADDY (obstructive sleep apnea)     Proteinuria     Right carpal tunnel syndrome     Sacroiliitis 6/13/2018    Sacroiliitis     Sleep apnea        Past Surgical History:   Procedure Laterality Date    CARPAL TUNNEL RELEASE      CARPAL TUNNEL RELEASE  1980s    left    CARPAL TUNNEL RELEASE  2012    right    COLONOSCOPY N/A 6/15/2020    Procedure: COLONOSCOPY;  Surgeon: cJ Koo MD;  Location: [facility] (4TH FLR);  Service: Endoscopy;  Laterality: N/A;  [facility] updated on drop off location and no visitor policy-    ESOPHAGOGASTRODUODENOSCOPY N/A 3/27/2019    Procedure: EGD (ESOPHAGOGASTRODUODENOSCOPY);  Surgeon: Robbin Bar MD;  Location: [facility] (2ND FLR);  Service: Endoscopy;  Laterality: N/A;  BMI 61.3/2nd floor case/svn    INJECTION OF JOINT Bilateral 6/9/2020    Procedure: INJECTION, JOINT, BILATERAL SI;  Surgeon: Lina Wagner MD;  [facility];  Service: Pain Management;  Laterality: Bilateral;  B/L SI Joint Injection    INJECTION OF JOINT Bilateral 5/11/2021    Procedure: INJECTION, JOINT, SACROILIAC (SI) need consent;  Surgeon:  Lina Wagner MD;  Location: [facility]IN [facility]GT;  Service: Pain Management;  Laterality: Bilateral;    JOINT REPLACEMENT Bilateral     with 2 revisions on rt    KNEE SURGERY  3/2010    orthroscope    KNEE SURGERY  6-19-14    left TKR    LAPAROSCOPIC SLEEVE GASTRECTOMY N/A 8/28/2019    Procedure: GASTRECTOMY, SLEEVE, LAPAROSCOPIC with intraop EGD;  Surgeon: Heriberto Clements MD;  Location: [facility] OR 2ND FLR;  Service: General;  Laterality: N/A;    RADIOFREQUENCY ABLATION Right 7/9/2019    Procedure: RADIOFREQUENCY ABLATION;  Surgeon: Lina Wagner MD;  Location: [facility];  Service: Pain Management;  Laterality: Right;  RIGHT RFA L2,3,4,5  2 of 2    RADIOFREQUENCY ABLATION Left 12/19/2019    Procedure: RADIOFREQUENCY ABLATION, LEFT L2-L3-L4-L5 MEDIAL BRANCH 1 OF 2;  Surgeon: Lina Wagner MD;  Location: [facility];  Service: Pain Management;  Laterality: Left;    RADIOFREQUENCY ABLATION Right 12/31/2019    Procedure: RADIOFREQUENCY ABLATION, RIGHT L2-L3-L4-L5  2 OF 2;  Surgeon: Lina Wagner MD;  Location: [facility];  Service: Pain Management;  Laterality: Right;    RADIOFREQUENCY ABLATION Right 10/13/2020    Procedure: RADIOFREQUENCY ABLATION, Genicular Cooled 1 of 2;  Surgeon: Lina Wagner MD;  Location: [facility];  Service: Pain Management;  Laterality: Right;    RADIOFREQUENCY ABLATION Left 11/3/2020    Procedure: RADIOFREQUENCY ABLATION, GENICULAR COOLED  2 OF 2;  Surgeon: Lina Wagner MD;  Location: [facility];  Service: Pain Management;  Laterality: Left;    RADIOFREQUENCY ABLATION Left 12/15/2020    Procedure: RADIOFREQUENCY ABLATION LEFT L2,3,4,5 1 of 2;  Surgeon: Lina Wagner MD;  Location: [facility];  Service: Pain Management;  Laterality: Left;    RADIOFREQUENCY ABLATION Right 12/29/2020    Procedure: RADIOFREQUENCY ABLATION RIGHT L2,3,4,5 2 of 2;  Surgeon: Lina Wagner MD;  Location: [facility];  Service: Pain Management;   Laterality: Right;    RADIOFREQUENCY ABLATION Left 6/29/2021    Procedure: RADIOFREQUENCY ABLATION GENICULAR COOLED;  Surgeon: Lina Wagner MD;  Location: [facility];  Service: Pain Management;  Laterality: Left;  1 of 2    RADIOFREQUENCY ABLATION Right 7/13/2021    Procedure: RADIOFREQUENCY ABLATION GENICULAR;  Surgeon: Lina Wagner MD;  Location: [facility];  Service: Pain Management;  Laterality: Right;  2 of 2    RADIOFREQUENCY ABLATION Right 8/24/2021    Procedure: RADIOFREQUENCY ABLATION, L2-L3-L4-L5 MEDIAL BRANCH 1 OF 2;  Surgeon: Lina Wagner MD;  Location: [facility];  Service: Pain Management;  Laterality: Right;    RADIOFREQUENCY ABLATION Left 9/11/2021    Procedure: RADIOFREQUENCY ABLATION, L2-L3-L4-L5 MEDIAL BR ANCH 2 OF 2;  Surgeon: Lina Wagner MD;  Location: [facility];  Service: Pain Management;  Laterality: Left;    RADIOFREQUENCY ABLATION Right 3/7/2022    Procedure: RADIOFREQUENCY ABLATION RIGHT L3,4,5 1 of 2, needs consent;  Surgeon: Israel Hurtado MD;  Location: [facility]IN MGT;  Service: Pain Management;  Laterality: Right;    RADIOFREQUENCY ABLATION Left 3/21/2022    Procedure: RADIOFREQUENCY ABLATION RIGHT L3,4,5 2 of 2, needs consent;  Surgeon: Israel Hurtado MD;  Location: [facility];  Service: Pain Management;  Laterality: Left;    RADIOFREQUENCY ABLATION Right 5/9/2022    Procedure: RADIOFREQUENCY ABLATION RIGHT GENICULAR COOLED 1 of 2;  Surgeon: Israel Hurtado MD;  Location: [facility];  Service: Pain Management;  Laterality: Right;    RADIOFREQUENCY ABLATION Left 5/23/2022    Procedure: RADIOFREQUENCY ABLATION LEFT GENICULAR COOLED  2 of 2;  Surgeon: Israel Hurtado MD;  Location: [facility];  Service: Pain Management;  Laterality: Left;    RADIOFREQUENCY ABLATION OF LUMBAR MEDIAL BRANCH NERVE AT SINGLE LEVEL Left 6/26/2018    Procedure: RADIOFREQUENCY ABLATION, NERVE, MEDIAL BRANCH, LUMBAR, 1 LEVEL;  Surgeon: Lina Wagner MD;  Location:  [facility];  Service: Pain Management;  Laterality: Left;  Left Cooled RFA @ L2,3,4,5  [id number]  with Sedation    1 of 2    RADIOFREQUENCY ABLATION OF LUMBAR MEDIAL BRANCH NERVE AT SINGLE LEVEL Right 7/10/2018    Procedure: RADIOFREQUENCY ABLATION, NERVE, MEDIAL BRANCH, LUMBAR, 1 LEVEL;  Surgeon: Lina Wagner MD;  Location: [facility];  Service: Pain Management;  Laterality: Right;  RIght Cooled RFA @ L2,3,4,5  [id number] with Sedation    2 of 2    TRIGGER FINGER RELEASE Right 2017    TRIGGER FINGER RELEASE Left 7/29/2019    Procedure: RELEASE, TRIGGER FINGER, Left Thumb;  Surgeon: Velma Garcia MD;  Location: [facility] OR;  Service: Orthopedics;  Laterality: Left;  Local w/ MAC       Review of patient's allergies indicates:   Allergen Reactions    Strawberry Anaphylaxis       Family History       Problem Relation (Age of Onset)    Cancer Sister    Cataracts Mother, Father    Coronary artery disease Brother    Diabetes Mother, Father, Sister, Brother, Brother    Hypertension Sister    Hypotension Brother, Brother    Kidney failure Brother    No Known Problems Sister          Tobacco Use    Smoking status: Never Smoker    Smokeless tobacco: Never Used   Substance and Sexual Activity    Alcohol use: Yes     Comment: occasionally     Drug use: No    Sexual activity: Yes     Partners: Female     Birth control/protection: None      Review of Systems   Constitutional:  Negative for activity change, appetite change, chills, diaphoresis, fatigue, fever and unexpected weight change.   HENT:  Negative for congestion, sinus pain and sore throat.    Eyes:  Negative for photophobia and visual disturbance.   Respiratory:  Positive for shortness of breath. Negative for choking, wheezing and stridor.    Cardiovascular:  Negative for chest pain, palpitations and leg swelling.   Gastrointestinal:  Positive for abdominal pain. Negative for abdominal distention, blood in stool, diarrhea, nausea and vomiting.    Genitourinary:  Negative for difficulty urinating, dysuria and hematuria.   Musculoskeletal:  Negative for arthralgias, myalgias and neck pain.   Skin:  Negative for color change, pallor and rash.   Neurological:  Negative for dizziness, weakness, light-headedness and headaches.   Psychiatric/Behavioral:  Negative for confusion and hallucinations. The patient is not nervous/anxious.    Objective:     Vital Signs (Most Recent):  Temp: 97.6 °F (36.4 °C) (06/14/22 2201)  Pulse: 76 (06/15/22 0302)  Resp: 20 (06/15/22 0002)  BP: 129/65 (06/15/22 0301)  SpO2: 98 % (06/15/22 0302)   Vital Signs (24h Range):  Temp:  [97.2 °F (36.2 °C)-97.8 °F (36.6 °C)] 97.6 °F (36.4 °C)  Pulse:  [57-82] 76  Resp:  [10-21] 20  SpO2:  [94 %-100 %] 98 %  BP: (129-172)/(65-82) 129/65      There is no height or weight on file to calculate BMI.    No intake or output data in the 24 hours ending 06/15/22 0325    Physical Exam  Vitals and nursing note reviewed.   Constitutional:       General: She is not in acute distress.     Appearance: Normal appearance. She is obese. She is not toxic-appearing or diaphoretic.   HENT:      Head: Normocephalic and atraumatic.      Nose: Nose normal.      Mouth/Throat:      Mouth: Mucous membranes are moist.   Eyes:      Extraocular Movements: Extraocular movements intact.      Pupils: Pupils are equal, round, and reactive to light.   Cardiovascular:      Rate and Rhythm: Normal rate and regular rhythm.      Pulses: Normal pulses.      Heart sounds: Normal heart sounds. No murmur heard.  Pulmonary:      Effort: Pulmonary effort is normal. No respiratory distress.      Breath sounds: Normal breath sounds. No wheezing, rhonchi or rales.      Comments: Currently on room air  Abdominal:      General: Bowel sounds are normal.      Palpations: Abdomen is soft.      Tenderness: There is no guarding.      Comments: Abdominal binder in place with some blood noted. Incision site above clean  and intact.   VANI drains  bilaterally with bloody drainage   Genitourinary:     Comments: deferred  Musculoskeletal:         General: No swelling or tenderness. Normal range of motion.      Cervical back: Normal range of motion. No tenderness.      Right lower leg: No edema.      Left lower leg: No edema.   Skin:     General: Skin is warm and dry.      Capillary Refill: Capillary refill takes less than 2 seconds.   Neurological:      General: No focal deficit present.      Mental Status: She is alert and oriented to person, place, and time.      Motor: No weakness.   Psychiatric:         Mood and Affect: Mood normal.         Behavior: Behavior normal.         CRANIAL NERVES     CN III, IV, VI   Pupils are equal, round, and reactive to light.    CBC/Anemia Profile:  Recent Labs   Lab 06/14/22  2232   WBC 11.18   HGB 12.7   HCT 39.7      *   RDW 13.6          Chemistries:  Recent Labs   Lab 06/15/22  0005      K 3.6      CO2 22*   BUN 13   CREATININE 0.8   CALCIUM 9.3   ALBUMIN 3.7   PROT 7.4   BILITOT 0.7   ALKPHOS 57   ALT 15   AST 15   MG 1.7   PHOS 3.4         ABGs:   Recent Labs   Lab 06/15/22  0118   PH 7.253*   PCO2 56.2*   HCO3 24.8   POCSATURATED 65*   BE -2         Significant Imaging: I have reviewed all pertinent imaging results/findings within the past 24 hours.  Imaging Results              X-Ray Chest AP Portable (Final result)  Result time 06/15/22 00:35:31      Final result by Silver Majano MD (06/15/22 00:35:31)                   Impression:      Diminished depth of inspiration without additional radiographic evidence for acute intrathoracic process.      Electronically signed by: Silver Majano  Date:    06/15/2022  Time:    00:35               Narrative:    EXAMINATION:  XR CHEST AP PORTABLE    CLINICAL HISTORY:  Hypoxia;    TECHNIQUE:  Single frontal view of the chest was performed.    COMPARISON:  Chest radiograph November 15, 2021    FINDINGS:  Single chest view is submitted.  There is  diminished depth of inspiration and there is mild rotation, when accounting for these factors the cardiomediastinal silhouette appears stable.  Aortic atherosclerotic change noted.    Accentuated pulmonary bronchovascular markings consistent with diminished depth of inspiration noted.  Asymmetric density on the left is thought due to soft tissue attenuation and asymmetric due to positioning.  There is no evidence for superimposed confluent infiltrate or consolidation, significant pleural effusion or pneumothorax.  The osseous structures appear intact.  Chronic change noted.                                        Assessment/Plan:     * Acute hypercapnic respiratory failure  Patient with Hypercapnic Respiratory failure which is Acute.  she is not on home oxygen. Supplemental oxygen was provided and noted-   Oxygen Concentration (%):  [30] 30    VBG  Recent Labs   Lab 06/15/22  0118   PH 7.253*   PO2 39*   PCO2 56.2*   HCO3 24.8   BE -2     Signs/symptoms of respiratory failure include- tachypnea, increased work of breathing and lethargy. Contributing diagnoses includes - hypoventilation Labs and images were reviewed. Patient Has recent ABG, which has been reviewed. Will treat underlying causes and adjust management of respiratory failure as follows-     Patient with PMHx significant for PADDY not on home CPAP, s/p panniculectomy earlier today, who presented couple of hours after discharge. She reports developing SOB after getting home she got home noting her oxygen saturation was 80-85%. She found to be somnolent with respiratory acidosis and acute respiratory failure. Initial VBG PH 7.25, PCO2 64.6. she was placed on BiPAP with significant improvement of the acidemia and she is fully oriented and conversional. Patient is currently satting high 90s on room air and has zero signs of CO2 narcosis. CCM was consulted in the ED and recs are as follows:     -- Bipap prn; currently doing well on room air  --  about the  benefit of CPAP QHS (h/o PADDY), but pt declined   -- Avoid opioid or BZ, use with caution if needed      Gout  -Continue allopurinol daily.    S/P panniculectomy  Pt s/p panniculectomy 6/14 with Dr. Whaley  -Keep surgical sites clean and dry.   -Record drain outputs daily and keep drains milked.   -Wear abdominal binder daily   -Continue oral clindamycin x7 days  -Has clinic f/u in 1 week    BMI 39.0-39.9,adult  Body mass index is 39.03 kg/m². Obesity complicates all aspects of disease management from diagnostic modalities to treatment. Pt s/p sleeve gastrectomy 2019.    Asthma  -No acute exacerbation.  -PRN duo nebs.    GERD (gastroesophageal reflux disease)  -Chronic, stable.  -Continue PPI.    Depression  -Chronic, stable.   -Continue prozac.    Chronic pain  - reviewed, continue percocet PRN.    Hyperlipemia   Patient is chronically on statin.will continue for now. Monitor clinically. Last LDL was   Lab Results   Component Value Date    LDLCALC 75.2 11/08/2021        Type 2 diabetes mellitus, uncontrolled  -Diet controlled. Glucose 190, likely steroid induced (received IV solumedrol in the ED).  -Hgb A1c 5.9  -LDSSI  -Accuchecks AC/HS  -Diabetic/ cardiac diet.    PADDY (obstructive sleep apnea)  -Patient not compliant with CPAP.    VTE Risk Mitigation (From admission, onward)         Ordered     enoxaparin injection 40 mg  Daily         06/15/22 0314     IP VTE HIGH RISK PATIENT  Once         06/15/22 0314     Place sequential compression device  Until discontinued         06/15/22 0314                   Janny Fam NP  Department of Hospital Medicine   [facility] - Intensive Care ([place]-[facility])

## 2022-06-15 NOTE — ASSESSMENT & PLAN NOTE
Patient  with Hypercapnic Respiratory failure which is Acute.  she is not on home oxygen. Supplemental oxygen was provided and noted- Oxygen Concentration (%):  [30] 30.   Signs/symptoms of respiratory failure include- tachypnea and increased work of breathing. Contributing diagnoses includes - hypoventilation  Labs and images were reviewed. Patient Has recent ABG, which has been reviewed. Will treat underlying causes and adjust management of respiratory failure as follows-     Patient with PMHx significant for PADDY not on home CPAP, s/p panniculectomy earlier today, who presented couple of hours after she got home with SOB. She found somnolence with respiratory acidosis and acute respiratory failure. VBG PH 7.25, PCO2 64.6. she was placed on BiPAP with significant improvement of the acidemia and she is fully oriented and conversional. Patient is currently satting high 90s on room air and has zero signs of CO2 narcosis.     Plan :     -- Bipap prn; currently doing well on room air  --  about the benefit of CPAP QHS (h/o PADDY), but pt declined   -- Avoid opioid or BZ, use with cation if needed

## 2022-06-15 NOTE — HPI
Patient is a 56 y.o. female with significant past medical history of PADDY, morbid obesity, DM II, DDD, depression, GERD and asthma presented to hospital complaining of shortness of breath.     The patient underwent panniculectomy on 6/14. She was discharged from the PACU at 7:45p and per the patient, she received percocet prior to being discharged home. Once the patient arrived home, she became short of breath with sats in the mid 80s on home pulse ox. The patient's  called an [facility] nurse triage line who instructed the  to call 911.     The patient presented to the ED ~ 22:30. She was found to have a respiratory acidosis and was placed on bipap. All other labs are grossly WNL. Critical Care Medicine was consulted for hypercapnic respiratory failure. At time of evaluation, the patient is alert, speaking full sentences without difficulty and answering advanced orientation questions. She has been taken off bipap during CCM evaluation and is doing well on room air.

## 2022-06-15 NOTE — SUBJECTIVE & OBJECTIVE
Past Medical History:   Diagnosis Date    Allergy     Anemia     Asthma     Bilateral shoulder bursitis     Cervical stenosis of spine     Chronic pain     DDD (degenerative disc disease), cervical     Depression 1/28/2019    Diabetes mellitus type II     DJD (degenerative joint disease) of knee 6/19/2014    Facet arthritis of lumbar region 12/17/2015    Facet syndrome 12/17/2015    GERD (gastroesophageal reflux disease)     Heartburn     Hyperlipidemia     Hypertension     Morbid obesity     Neuromuscular disorder     PADDY (obstructive sleep apnea)     Proteinuria     Right carpal tunnel syndrome     Sacroiliitis 6/13/2018    Sacroiliitis     Sleep apnea        Past Surgical History:   Procedure Laterality Date    CARPAL TUNNEL RELEASE      CARPAL TUNNEL RELEASE  1980s    left    CARPAL TUNNEL RELEASE  2012    right    COLONOSCOPY N/A 6/15/2020    Procedure: COLONOSCOPY;  Surgeon: Jc Koo MD;  Location: [facility] (4TH FLR);  Service: Endoscopy;  Laterality: N/A;  COVID screening on 6/13/20 at [facility]  pt updated on drop off location and no visitor policy-rb    ESOPHAGOGASTRODUODENOSCOPY N/A 3/27/2019    Procedure: EGD (ESOPHAGOGASTRODUODENOSCOPY);  Surgeon: Robbin Bar MD;  Location: [facility] (2ND FLR);  Service: Endoscopy;  Laterality: N/A;  BMI 61.3/2nd floor case/svn    INJECTION OF JOINT Bilateral 6/9/2020    Procedure: INJECTION, JOINT, BILATERAL SI;  Surgeon: Lina Wagner MD;  [facility];  Service: Pain Management;  Laterality: Bilateral;  B/L SI Joint Injection    INJECTION OF JOINT Bilateral 5/11/2021    Procedure: INJECTION, JOINT, SACROILIAC (SI) need consent;  Surgeon: Lina Wagner MD;  Location: [facility] PAIN T;  Service: Pain Management;  Laterality: Bilateral;    JOINT REPLACEMENT Bilateral     with 2 revisions on rt    KNEE SURGERY  3/2010    orthroscope    KNEE SURGERY  6-19-14    left TKR    LAPAROSCOPIC SLEEVE GASTRECTOMY N/A 8/28/2019    Procedure:  GASTRECTOMY, SLEEVE, LAPAROSCOPIC with intraop EGD;  Surgeon: Heriberto Clements MD;  Location: [facility] OR 2ND FLR;  Service: General;  Laterality: N/A;    RADIOFREQUENCY ABLATION Right 7/9/2019    Procedure: RADIOFREQUENCY ABLATION;  Surgeon: Lina Wanger MD;  Location: [facility] PAIN MGT;  Service: Pain Management;  Laterality: Right;  RIGHT RFA L2,3,4,5  2 of 2    RADIOFREQUENCY ABLATION Left 12/19/2019    Procedure: RADIOFREQUENCY ABLATION, LEFT L2-L3-L4-L5 MEDIAL BRANCH 1 OF 2;  Surgeon: Lina Wagner MD;  Location: [facility] PAIN MGT;  Service: Pain Management;  Laterality: Left;    RADIOFREQUENCY ABLATION Right 12/31/2019    Procedure: RADIOFREQUENCY ABLATION, RIGHT L2-L3-L4-L5  2 OF 2;  Surgeon: Lina Wagner MD;  Location: [facility] PAIN MGT;  Service: Pain Management;  Laterality: Right;    RADIOFREQUENCY ABLATION Right 10/13/2020    Procedure: RADIOFREQUENCY ABLATION, Genicular Cooled 1 of 2;  Surgeon: Lina Wagner MD;  Location: [facility] PAIN MGT;  Service: Pain Management;  Laterality: Right;    RADIOFREQUENCY ABLATION Left 11/3/2020    Procedure: RADIOFREQUENCY ABLATION, GENICULAR COOLED  2 OF 2;  Surgeon: Lina Wagner MD;  Location: [facility] PAIN MGT;  Service: Pain Management;  Laterality: Left;    RADIOFREQUENCY ABLATION Left 12/15/2020    Procedure: RADIOFREQUENCY ABLATION LEFT L2,3,4,5 1 of 2;  Surgeon: Lina Wagner MD;  Location: [facility] PAIN MGT;  Service: Pain Management;  Laterality: Left;    RADIOFREQUENCY ABLATION Right 12/29/2020    Procedure: RADIOFREQUENCY ABLATION RIGHT L2,3,4,5 2 of 2;  Surgeon: Lina Wagner MD;  Location: [facility] PAIN MGT;  Service: Pain Management;  Laterality: Right;    RADIOFREQUENCY ABLATION Left 6/29/2021    Procedure: RADIOFREQUENCY ABLATION GENICULAR COOLED;  Surgeon: Lina Wagner MD;  Location: [facility] PAIN MGT;  Service: Pain Management;  Laterality: Left;  1 of 2    RADIOFREQUENCY ABLATION Right 7/13/2021    Procedure: RADIOFREQUENCY ABLATION  GENICULAR;  Surgeon: Lina Wagner MD;  Location: [facility] PAIN [facility]GT;  Service: Pain Management;  Laterality: Right;  2 of 2    RADIOFREQUENCY ABLATION Right 8/24/2021    Procedure: RADIOFREQUENCY ABLATION, L2-L3-L4-L5 MEDIAL BRANCH 1 OF 2;  Surgeon: Lina Wagner MD;  Location: [facility] PAIN [facility]GT;  Service: Pain Management;  Laterality: Right;    RADIOFREQUENCY ABLATION Left 9/11/2021    Procedure: RADIOFREQUENCY ABLATION, L2-L3-L4-L5 MEDIAL BR ANCH 2 OF 2;  Surgeon: Lina Wagner MD;  Location: [facility] PAIN [facility]GT;  Service: Pain Management;  Laterality: Left;    RADIOFREQUENCY ABLATION Right 3/7/2022    Procedure: RADIOFREQUENCY ABLATION RIGHT L3,4,5 1 of 2, needs consent;  Surgeon: Israel Hurtado MD;  Location: [facility] PAIN MGT;  Service: Pain Management;  Laterality: Right;    RADIOFREQUENCY ABLATION Left 3/21/2022    Procedure: RADIOFREQUENCY ABLATION RIGHT L3,4,5 2 of 2, needs consent;  Surgeon: Israel Hurtado MD;  Location: [facility] PAIN MGT;  Service: Pain Management;  Laterality: Left;    RADIOFREQUENCY ABLATION Right 5/9/2022    Procedure: RADIOFREQUENCY ABLATION RIGHT GENICULAR COOLED 1 of 2;  Surgeon: Israel Hurtado MD;  Location: [facility] PAIN MGT;  Service: Pain Management;  Laterality: Right;    RADIOFREQUENCY ABLATION Left 5/23/2022    Procedure: RADIOFREQUENCY ABLATION LEFT GENICULAR COOLED  2 of 2;  Surgeon: Israel Hurtado MD;  Location: [facility] PAIN MGT;  Service: Pain Management;  Laterality: Left;    RADIOFREQUENCY ABLATION OF LUMBAR MEDIAL BRANCH NERVE AT SINGLE LEVEL Left 6/26/2018    Procedure: RADIOFREQUENCY ABLATION, NERVE, MEDIAL BRANCH, LUMBAR, 1 LEVEL;  Surgeon: Lina Wagner MD;  Location: [facility] PAIN MGT;  Service: Pain Management;  Laterality: Left;  Left Cooled RFA @ L2,3,4,5  14425-92225  with Sedation    1 of 2    RADIOFREQUENCY ABLATION OF LUMBAR MEDIAL BRANCH NERVE AT SINGLE LEVEL Right 7/10/2018    Procedure: RADIOFREQUENCY ABLATION, NERVE, MEDIAL BRANCH, LUMBAR, 1 LEVEL;  Surgeon: Lina QUICK  MD Kristy;  Location: [facility] PAIN MGT;  Service: Pain Management;  Laterality: Right;  RIght Cooled RFA @ L2,3,4,5  [id number] with Sedation    2 of 2    TRIGGER FINGER RELEASE Right 2017    TRIGGER FINGER RELEASE Left 7/29/2019    Procedure: RELEASE, TRIGGER FINGER, Left Thumb;  Surgeon: Velma Garcia MD;  Location: [facility] OR;  Service: Orthopedics;  Laterality: Left;  Local w/ MAC       Review of patient's allergies indicates:   Allergen Reactions    Strawberry Anaphylaxis       Family History       Problem Relation (Age of Onset)    Cancer Sister    Cataracts Mother, Father    Coronary artery disease Brother    Diabetes Mother, Father, Sister, Brother, Brother    Hypertension Sister    Hypotension Brother, Brother    Kidney failure Brother    No Known Problems Sister          Tobacco Use    Smoking status: Never Smoker    Smokeless tobacco: Never Used   Substance and Sexual Activity    Alcohol use: Yes     Comment: occasionally     Drug use: No    Sexual activity: Yes     Partners: Female     Birth control/protection: None      Review of Systems   Constitutional:  Negative for activity change, chills, fever and unexpected weight change.   HENT:  Negative for sinus pain and sore throat.    Eyes:  Negative for pain and visual disturbance.   Respiratory:  Positive for shortness of breath. Negative for choking, wheezing and stridor.    Cardiovascular:  Negative for chest pain, palpitations and leg swelling.   Gastrointestinal:  Positive for abdominal pain. Negative for abdominal distention, blood in stool, diarrhea, nausea and vomiting.   Genitourinary:  Negative for dysuria and hematuria.   Skin:  Negative for rash.   Neurological:  Negative for dizziness, weakness, light-headedness and headaches.   Objective:     Vital Signs (Most Recent):  Temp: 97.6 °F (36.4 °C) (06/14/22 2201)  Pulse: 77 (06/15/22 0132)  Resp: 20 (06/15/22 0002)  BP: (!) 160/76 (06/15/22 0132)  SpO2: 97 % (06/15/22 0132)   Vital Signs  (24h Range):  Temp:  [97.2 °F (36.2 °C)-97.8 °F (36.6 °C)] 97.6 °F (36.4 °C)  Pulse:  [57-81] 77  Resp:  [10-21] 20  SpO2:  [96 %-100 %] 97 %  BP: (137-172)/(65-82) 160/76      There is no height or weight on file to calculate BMI.    No intake or output data in the 24 hours ending 06/15/22 0207    Physical Exam  Vitals and nursing note reviewed.   Constitutional:       General: She is not in acute distress.     Appearance: Normal appearance. She is not toxic-appearing.   HENT:      Mouth/Throat:      Mouth: Mucous membranes are moist.   Eyes:      Extraocular Movements: Extraocular movements intact.      Pupils: Pupils are equal, round, and reactive to light.   Cardiovascular:      Rate and Rhythm: Normal rate and regular rhythm.      Pulses: Normal pulses.      Heart sounds: Normal heart sounds. No murmur heard.  Pulmonary:      Effort: Pulmonary effort is normal. No respiratory distress.      Breath sounds: Normal breath sounds. No wheezing or rales.   Abdominal:      Palpations: Abdomen is soft.      Comments: Abdominal binder in place with some blood noted. Incision site above clean  and intact.   VANI drains bilaterally with bloody drainage   Musculoskeletal:      Right lower leg: No edema.      Left lower leg: No edema.   Skin:     General: Skin is warm and dry.      Capillary Refill: Capillary refill takes less than 2 seconds.   Neurological:      General: No focal deficit present.      Mental Status: She is alert and oriented to person, place, and time.      Motor: No weakness.       Vents:  Oxygen Concentration (%): 30 (06/14/22 2325)  Lines/Drains/Airways       Drain  Duration                  Closed/Suction Drain Left;Ventral Hip 15 Fr. -- days         Closed/Suction Drain 06/14/22 1831 Right Hip 15 Fr. <1 day              Peripheral Intravenous Line  Duration                  Peripheral IV - Single Lumen 06/14/22 2233 20 G Right Antecubital <1 day                  Significant Labs:    CBC/Anemia  Profile:  Recent Labs   Lab 06/14/22  2232   WBC 11.18   HGB 12.7   HCT 39.7      *   RDW 13.6        Chemistries:  Recent Labs   Lab 06/15/22  0005      K 3.6      CO2 22*   BUN 13   CREATININE 0.8   CALCIUM 9.3   ALBUMIN 3.7   PROT 7.4   BILITOT 0.7   ALKPHOS 57   ALT 15   AST 15   MG 1.7   PHOS 3.4       ABGs:   Recent Labs   Lab 06/15/22  0118   PH 7.253*   PCO2 56.2*   HCO3 24.8   POCSATURATED 65*   BE -2       Significant Imaging: I have reviewed all pertinent imaging results/findings within the past 24 hours.

## 2022-06-15 NOTE — ASSESSMENT & PLAN NOTE
Body mass index is 39.03 kg/m². Obesity complicates all aspects of disease management from diagnostic modalities to treatment. Pt s/p sleeve gastrectomy 2019.

## 2022-06-15 NOTE — ASSESSMENT & PLAN NOTE
-Diet controlled. Glucose 190, likely steroid induced (received IV solumedrol in the ED).  -Hgb A1c 5.9  -LDSSI  -Accuchecks AC/HS  -Diabetic/ cardiac diet.

## 2022-06-15 NOTE — SUBJECTIVE & OBJECTIVE
Past Medical History:   Diagnosis Date    Allergy     Anemia     Asthma     Bilateral shoulder bursitis     Cervical stenosis of spine     Chronic pain     DDD (degenerative disc disease), cervical     Depression 1/28/2019    Diabetes mellitus type II     DJD (degenerative joint disease) of knee 6/19/2014    Facet arthritis of lumbar region 12/17/2015    Facet syndrome 12/17/2015    GERD (gastroesophageal reflux disease)     Heartburn     Hyperlipidemia     Hypertension     Morbid obesity     Neuromuscular disorder     PADDY (obstructive sleep apnea)     Proteinuria     Right carpal tunnel syndrome     Sacroiliitis 6/13/2018    Sacroiliitis     Sleep apnea        Past Surgical History:   Procedure Laterality Date    CARPAL TUNNEL RELEASE      CARPAL TUNNEL RELEASE  1980s    left    CARPAL TUNNEL RELEASE  2012    right    COLONOSCOPY N/A 6/15/2020    Procedure: COLONOSCOPY;  Surgeon: Jc Koo MD;  Location: [facility] (4TH FLR);  Service: Endoscopy;  Laterality: N/A;  COVID screening on 6/13/20 at [facility]  pt updated on drop off location and no visitor policy-rb    ESOPHAGOGASTRODUODENOSCOPY N/A 3/27/2019    Procedure: EGD (ESOPHAGOGASTRODUODENOSCOPY);  Surgeon: Robbin Bar MD;  Location: [facility] (2ND FLR);  Service: Endoscopy;  Laterality: N/A;  BMI 61.3/2nd floor case/svn    INJECTION OF JOINT Bilateral 6/9/2020    Procedure: INJECTION, JOINT, BILATERAL SI;  Surgeon: Lina Wagner MD;  [facility]  Service: Pain Management;  Laterality: Bilateral;  B/L SI Joint Injection    INJECTION OF JOINT Bilateral 5/11/2021    Procedure: INJECTION, JOINT, SACROILIAC (SI) need consent;  Surgeon: Lina Wagner MD;  Location: [facility] PAIN T;  Service: Pain Management;  Laterality: Bilateral;    JOINT REPLACEMENT Bilateral     with 2 revisions on rt    KNEE SURGERY  3/2010    orthroscope    KNEE SURGERY  6-19-14    left TKR    LAPAROSCOPIC SLEEVE GASTRECTOMY N/A 8/28/2019    Procedure:  GASTRECTOMY, SLEEVE, LAPAROSCOPIC with intraop EGD;  Surgeon: Heriberto Clements MD;  Location: [facility] OR 2ND FLR;  Service: General;  Laterality: N/A;    RADIOFREQUENCY ABLATION Right 7/9/2019    Procedure: RADIOFREQUENCY ABLATION;  Surgeon: Lina Wagner MD;  Location: [facility] PAIN MGT;  Service: Pain Management;  Laterality: Right;  RIGHT RFA L2,3,4,5  2 of 2    RADIOFREQUENCY ABLATION Left 12/19/2019    Procedure: RADIOFREQUENCY ABLATION, LEFT L2-L3-L4-L5 MEDIAL BRANCH 1 OF 2;  Surgeon: Lina Wagner MD;  Location: [facility] PAIN MGT;  Service: Pain Management;  Laterality: Left;    RADIOFREQUENCY ABLATION Right 12/31/2019    Procedure: RADIOFREQUENCY ABLATION, RIGHT L2-L3-L4-L5  2 OF 2;  Surgeon: Lina Wagner MD;  Location: [facility] PAIN MGT;  Service: Pain Management;  Laterality: Right;    RADIOFREQUENCY ABLATION Right 10/13/2020    Procedure: RADIOFREQUENCY ABLATION, Genicular Cooled 1 of 2;  Surgeon: Lina Wagner MD;  Location: [facility] PAIN MGT;  Service: Pain Management;  Laterality: Right;    RADIOFREQUENCY ABLATION Left 11/3/2020    Procedure: RADIOFREQUENCY ABLATION, GENICULAR COOLED  2 OF 2;  Surgeon: Lina Wagner MD;  Location: [facility] PAIN MGT;  Service: Pain Management;  Laterality: Left;    RADIOFREQUENCY ABLATION Left 12/15/2020    Procedure: RADIOFREQUENCY ABLATION LEFT L2,3,4,5 1 of 2;  Surgeon: Lina Wagner MD;  Location: [facility] PAIN MGT;  Service: Pain Management;  Laterality: Left;    RADIOFREQUENCY ABLATION Right 12/29/2020    Procedure: RADIOFREQUENCY ABLATION RIGHT L2,3,4,5 2 of 2;  Surgeon: Lina Wagner MD;  Location: [facility] PAIN MGT;  Service: Pain Management;  Laterality: Right;    RADIOFREQUENCY ABLATION Left 6/29/2021    Procedure: RADIOFREQUENCY ABLATION GENICULAR COOLED;  Surgeon: Lina Wagner MD;  Location: [facility] PAIN MGT;  Service: Pain Management;  Laterality: Left;  1 of 2    RADIOFREQUENCY ABLATION Right 7/13/2021    Procedure: RADIOFREQUENCY ABLATION  GENICULAR;  Surgeon: Lina Wagner MD;  Location: [facility]IN [facility]GT;  Service: Pain Management;  Laterality: Right;  2 of 2    RADIOFREQUENCY ABLATION Right 8/24/2021    Procedure: RADIOFREQUENCY ABLATION, L2-L3-L4-L5 MEDIAL BRANCH 1 OF 2;  Surgeon: Lina Wagner MD;  Location: [facility];  Service: Pain Management;  Laterality: Right;    RADIOFREQUENCY ABLATION Left 9/11/2021    Procedure: RADIOFREQUENCY ABLATION, L2-L3-L4-L5 MEDIAL BR ANCH 2 OF 2;  Surgeon: Lina Wagner MD;  Location: [facility];  Service: Pain Management;  Laterality: Left;    RADIOFREQUENCY ABLATION Right 3/7/2022    Procedure: RADIOFREQUENCY ABLATION RIGHT L3,4,5 1 of 2, needs consent;  Surgeon: Israel Hurtado MD;  Location: [facility] PAIN MGT;  Service: Pain Management;  Laterality: Right;    RADIOFREQUENCY ABLATION Left 3/21/2022    Procedure: RADIOFREQUENCY ABLATION RIGHT L3,4,5 2 of 2, needs consent;  Surgeon: Israel Hurtado MD;  Location: [facility] PAIN MGT;  Service: Pain Management;  Laterality: Left;    RADIOFREQUENCY ABLATION Right 5/9/2022    Procedure: RADIOFREQUENCY ABLATION RIGHT GENICULAR COOLED 1 of 2;  Surgeon: Israel Hurtado MD;  Location: [facility] PAIN MGT;  Service: Pain Management;  Laterality: Right;    RADIOFREQUENCY ABLATION Left 5/23/2022    Procedure: RADIOFREQUENCY ABLATION LEFT GENICULAR COOLED  2 of 2;  Surgeon: Israel Hurtado MD;  Location: [facility] PAIN MGT;  Service: Pain Management;  Laterality: Left;    RADIOFREQUENCY ABLATION OF LUMBAR MEDIAL BRANCH NERVE AT SINGLE LEVEL Left 6/26/2018    Procedure: RADIOFREQUENCY ABLATION, NERVE, MEDIAL BRANCH, LUMBAR, 1 LEVEL;  Surgeon: Lina Wagner MD;  Location: [facility] PAIN MGT;  Service: Pain Management;  Laterality: Left;  Left Cooled RFA @ L2,3,4,5  [id number]  with Sedation    1 of 2    RADIOFREQUENCY ABLATION OF LUMBAR MEDIAL BRANCH NERVE AT SINGLE LEVEL Right 7/10/2018    Procedure: RADIOFREQUENCY ABLATION, NERVE, MEDIAL BRANCH, LUMBAR, 1 LEVEL;  Surgeon: Lina QUICK  MD Kristy;  Location: [facility] PAIN MGT;  Service: Pain Management;  Laterality: Right;  RIght Cooled RFA @ L2,3,4,5  [id number] with Sedation    2 of 2    TRIGGER FINGER RELEASE Right 2017    TRIGGER FINGER RELEASE Left 7/29/2019    Procedure: RELEASE, TRIGGER FINGER, Left Thumb;  Surgeon: Velma Garcia MD;  Location: [facility] OR;  Service: Orthopedics;  Laterality: Left;  Local w/ MAC       Review of patient's allergies indicates:   Allergen Reactions    Strawberry Anaphylaxis       Family History       Problem Relation (Age of Onset)    Cancer Sister    Cataracts Mother, Father    Coronary artery disease Brother    Diabetes Mother, Father, Sister, Brother, Brother    Hypertension Sister    Hypotension Brother, Brother    Kidney failure Brother    No Known Problems Sister          Tobacco Use    Smoking status: Never Smoker    Smokeless tobacco: Never Used   Substance and Sexual Activity    Alcohol use: Yes     Comment: occasionally     Drug use: No    Sexual activity: Yes     Partners: Female     Birth control/protection: None      Review of Systems   Constitutional:  Negative for activity change, appetite change, chills, diaphoresis, fatigue, fever and unexpected weight change.   HENT:  Negative for congestion, sinus pain and sore throat.    Eyes:  Negative for photophobia and visual disturbance.   Respiratory:  Positive for shortness of breath. Negative for choking, wheezing and stridor.    Cardiovascular:  Negative for chest pain, palpitations and leg swelling.   Gastrointestinal:  Positive for abdominal pain. Negative for abdominal distention, blood in stool, diarrhea, nausea and vomiting.   Genitourinary:  Negative for difficulty urinating, dysuria and hematuria.   Musculoskeletal:  Negative for arthralgias, myalgias and neck pain.   Skin:  Negative for color change, pallor and rash.   Neurological:  Negative for dizziness, weakness, light-headedness and headaches.   Psychiatric/Behavioral:  Negative for  confusion and hallucinations. The patient is not nervous/anxious.    Objective:     Vital Signs (Most Recent):  Temp: 97.6 °F (36.4 °C) (06/14/22 2201)  Pulse: 76 (06/15/22 0302)  Resp: 20 (06/15/22 0002)  BP: 129/65 (06/15/22 0301)  SpO2: 98 % (06/15/22 0302)   Vital Signs (24h Range):  Temp:  [97.2 °F (36.2 °C)-97.8 °F (36.6 °C)] 97.6 °F (36.4 °C)  Pulse:  [57-82] 76  Resp:  [10-21] 20  SpO2:  [94 %-100 %] 98 %  BP: (129-172)/(65-82) 129/65      There is no height or weight on file to calculate BMI.    No intake or output data in the 24 hours ending 06/15/22 0325    Physical Exam  Vitals and nursing note reviewed.   Constitutional:       General: She is not in acute distress.     Appearance: Normal appearance. She is obese. She is not toxic-appearing or diaphoretic.   HENT:      Head: Normocephalic and atraumatic.      Nose: Nose normal.      Mouth/Throat:      Mouth: Mucous membranes are moist.   Eyes:      Extraocular Movements: Extraocular movements intact.      Pupils: Pupils are equal, round, and reactive to light.   Cardiovascular:      Rate and Rhythm: Normal rate and regular rhythm.      Pulses: Normal pulses.      Heart sounds: Normal heart sounds. No murmur heard.  Pulmonary:      Effort: Pulmonary effort is normal. No respiratory distress.      Breath sounds: Normal breath sounds. No wheezing, rhonchi or rales.      Comments: Currently on room air  Abdominal:      General: Bowel sounds are normal.      Palpations: Abdomen is soft.      Tenderness: There is no guarding.      Comments: Abdominal binder in place with some blood noted. Incision site above clean  and intact.   VANI drains bilaterally with bloody drainage   Genitourinary:     Comments: deferred  Musculoskeletal:         General: No swelling or tenderness. Normal range of motion.      Cervical back: Normal range of motion. No tenderness.      Right lower leg: No edema.      Left lower leg: No edema.   Skin:     General: Skin is warm and dry.       Capillary Refill: Capillary refill takes less than 2 seconds.   Neurological:      General: No focal deficit present.      Mental Status: She is alert and oriented to person, place, and time.      Motor: No weakness.   Psychiatric:         Mood and Affect: Mood normal.         Behavior: Behavior normal.         CRANIAL NERVES     CN III, IV, VI   Pupils are equal, round, and reactive to light.    CBC/Anemia Profile:  Recent Labs   Lab 06/14/22  2232   WBC 11.18   HGB 12.7   HCT 39.7      *   RDW 13.6          Chemistries:  Recent Labs   Lab 06/15/22  0005      K 3.6      CO2 22*   BUN 13   CREATININE 0.8   CALCIUM 9.3   ALBUMIN 3.7   PROT 7.4   BILITOT 0.7   ALKPHOS 57   ALT 15   AST 15   MG 1.7   PHOS 3.4         ABGs:   Recent Labs   Lab 06/15/22  0118   PH 7.253*   PCO2 56.2*   HCO3 24.8   POCSATURATED 65*   BE -2         Significant Imaging: I have reviewed all pertinent imaging results/findings within the past 24 hours.  Imaging Results              X-Ray Chest AP Portable (Final result)  Result time 06/15/22 00:35:31      Final result by Silvre Majano MD (06/15/22 00:35:31)                   Impression:      Diminished depth of inspiration without additional radiographic evidence for acute intrathoracic process.      Electronically signed by: Silver Majano  Date:    06/15/2022  Time:    00:35               Narrative:    EXAMINATION:  XR CHEST AP PORTABLE    CLINICAL HISTORY:  Hypoxia;    TECHNIQUE:  Single frontal view of the chest was performed.    COMPARISON:  Chest radiograph November 15, 2021    FINDINGS:  Single chest view is submitted.  There is diminished depth of inspiration and there is mild rotation, when accounting for these factors the cardiomediastinal silhouette appears stable.  Aortic atherosclerotic change noted.    Accentuated pulmonary bronchovascular markings consistent with diminished depth of inspiration noted.  Asymmetric density on the left is thought due  to soft tissue attenuation and asymmetric due to positioning.  There is no evidence for superimposed confluent infiltrate or consolidation, significant pleural effusion or pneumothorax.  The osseous structures appear intact.  Chronic change noted.

## 2022-06-15 NOTE — ANESTHESIA POSTPROCEDURE EVALUATION
Anesthesia Post Evaluation    Patient: Alivia Thomas    Procedure(s) Performed: Procedure(s) (LRB):  PANNICULECTOMY (N/A)    Final Anesthesia Type: general      Patient location during evaluation: ICU  Patient participation: Yes- Able to Participate  Level of consciousness: awake and alert  Post-procedure vital signs: reviewed and stable  Pain management: adequate  Airway patency: patent  PADDY mitigation strategies: Extubation while patient is awake  PONV status at discharge: No PONV  Anesthetic complications: no      Cardiovascular status: stable  Respiratory status: unassisted and spontaneous ventilation  Hydration status: euvolemic  Follow-up not needed.          Vitals Value Taken Time   /69 06/14/22 1930   Temp 36.2 °C (97.2 °F) 06/14/22 1845   Pulse 61 06/14/22 1931   Resp 11 06/14/22 1931   SpO2 100 % 06/14/22 1931   Vitals shown include unvalidated device data.      No case tracking events are documented in the log.      Pain/Aracely Score: Pain Rating Prior to Med Admin: 8 (6/15/2022  6:08 AM)  Aracely Score: 9 (6/14/2022  6:45 PM)

## 2022-06-15 NOTE — ED TRIAGE NOTES
Alivia Thomas, an 56 y.o. female presents to the ED via EMS s/p gastric sx and was D/C today with c/o SOB and low oxygen saturation at home. Pt reports oxygen saturation dropped into the 80's on at-home pulse ox. Pt was lethargic on arrival and 10L via non-re breather per EMS. EMS gave breathing tx in route.    Pt reports not filling prescriptions today.     Chief Complaint   Patient presents with    Shortness of Breath     Pt had gastric surgery today, c/o sob during hospital stay but d/c home. Pt given 1 breathing tx with EMS, vitals improved but pt states she feels worse. Sating 78% on RA, pt arrives on 6 L NRB sating 100%. Pot denies taking pain meds today, has not filled prescriptions, pt lethargic upon arrival     Review of patient's allergies indicates:   Allergen Reactions    Strawberry Anaphylaxis     Past Medical History:   Diagnosis Date    Allergy     Anemia     Asthma     Bilateral shoulder bursitis     Cervical stenosis of spine     Chronic pain     DDD (degenerative disc disease), cervical     Depression 1/28/2019    Diabetes mellitus type II     DJD (degenerative joint disease) of knee 6/19/2014    Facet arthritis of lumbar region 12/17/2015    Facet syndrome 12/17/2015    GERD (gastroesophageal reflux disease)     Heartburn     Hyperlipidemia     Hypertension     Morbid obesity     Neuromuscular disorder     PADDY (obstructive sleep apnea)     Proteinuria     Right carpal tunnel syndrome     Sacroiliitis 6/13/2018    Sacroiliitis     Sleep apnea

## 2022-06-15 NOTE — ASSESSMENT & PLAN NOTE
Patient is chronically on statin.will continue for now. Monitor clinically. Last LDL was   Lab Results   Component Value Date    LDLCALC 75.2 11/08/2021

## 2022-06-15 NOTE — ED PROVIDER NOTES
Encounter Date: 6/14/2022       History     Chief Complaint   Patient presents with    Shortness of Breath     Pt had gastric surgery today, c/o sob during hospital stay but d/c home. Pt given 1 breathing tx with EMS, vitals improved but pt states she feels worse. Sating 78% on RA, pt arrives on 6 L NRB sating 100%. Pot denies taking pain meds today, has not filled prescriptions, pt lethargic upon arrival     Patient is a 56 year old female with a PMHx of anemia, hypertension, HLD, DM and PADDY presenting to the emergency department for shortness of breath.  Patient states that she had a panniculectomy earlier today, and shortly prior to her discharge is when she began feeling short of breath. There is no associated chest pain. Per triage note, patient was satting 78% on EMS arrival to her residence. She is not on maribel o2. Further history is limited secondary to the patient's somnolence.        Review of patient's allergies indicates:   Allergen Reactions    Strawberry Anaphylaxis     Past Medical History:   Diagnosis Date    Allergy     Anemia     Asthma     Bilateral shoulder bursitis     Cervical stenosis of spine     Chronic pain     DDD (degenerative disc disease), cervical     Depression 1/28/2019    Diabetes mellitus type II     DJD (degenerative joint disease) of knee 6/19/2014    Facet arthritis of lumbar region 12/17/2015    Facet syndrome 12/17/2015    GERD (gastroesophageal reflux disease)     Heartburn     Hyperlipidemia     Hypertension     Morbid obesity     Neuromuscular disorder     PADDY (obstructive sleep apnea)     Proteinuria     Right carpal tunnel syndrome     Sacroiliitis 6/13/2018    Sacroiliitis     Sleep apnea      Past Surgical History:   Procedure Laterality Date    CARPAL TUNNEL RELEASE      CARPAL TUNNEL RELEASE  1980s    left    CARPAL TUNNEL RELEASE  2012    right    COLONOSCOPY N/A 6/15/2020    Procedure: COLONOSCOPY;  Surgeon: Jc Koo MD;  Location:  [facility] ENDO (4TH FLR);  Service: Endoscopy;  Laterality: N/A;  COVID screening on 6/13/20 at [facility]-rb  pt updated on drop off location and no visitor policy-rb    ESOPHAGOGASTRODUODENOSCOPY N/A 3/27/2019    Procedure: EGD (ESOPHAGOGASTRODUODENOSCOPY);  Surgeon: Robbin Bar MD;  Location: [facility] ENDO (2ND FLR);  Service: Endoscopy;  Laterality: N/A;  BMI 61.3/2nd floor case/svn    INJECTION OF JOINT Bilateral 6/9/2020    Procedure: INJECTION, JOINT, BILATERAL SI;  Surgeon: Lina Wagner MD;  Location: [facility] PAIN MGT;  Service: Pain Management;  Laterality: Bilateral;  B/L SI Joint Injection    INJECTION OF JOINT Bilateral 5/11/2021    Procedure: INJECTION, JOINT, SACROILIAC (SI) need consent;  Surgeon: Lina Wagner MD;  Location: [facility] PAIN MGT;  Service: Pain Management;  Laterality: Bilateral;    JOINT REPLACEMENT Bilateral     with 2 revisions on rt    KNEE SURGERY  3/2010    orthroscope    KNEE SURGERY  6-19-14    left TKR    LAPAROSCOPIC SLEEVE GASTRECTOMY N/A 8/28/2019    Procedure: GASTRECTOMY, SLEEVE, LAPAROSCOPIC with intraop EGD;  Surgeon: Heriberto Clements MD;  Location: [facility] OR 2ND FLR;  Service: General;  Laterality: N/A;    PANNICULECTOMY N/A 6/14/2022    Procedure: PANNICULECTOMY;  Surgeon: Rhett Gray MD;  Location: [facility] OR 2ND FLR;  Service: Plastics;  Laterality: N/A;    RADIOFREQUENCY ABLATION Right 7/9/2019    Procedure: RADIOFREQUENCY ABLATION;  Surgeon: Lina Wagner MD;  Location: [facility] PAIN MGT;  Service: Pain Management;  Laterality: Right;  RIGHT RFA L2,3,4,5  2 of 2    RADIOFREQUENCY ABLATION Left 12/19/2019    Procedure: RADIOFREQUENCY ABLATION, LEFT L2-L3-L4-L5 MEDIAL BRANCH 1 OF 2;  Surgeon: Lina Wagner MD;  Location: [facility] PAIN MGT;  Service: Pain Management;  Laterality: Left;    RADIOFREQUENCY ABLATION Right 12/31/2019    Procedure: RADIOFREQUENCY ABLATION, RIGHT L2-L3-L4-L5  2 OF 2;  Surgeon: Lina Wagner MD;  Location:  [facility] PAIN MGT;  Service: Pain Management;  Laterality: Right;    RADIOFREQUENCY ABLATION Right 10/13/2020    Procedure: RADIOFREQUENCY ABLATION, Genicular Cooled 1 of 2;  Surgeon: Lina Wagner MD;  Location: [facility] PAIN MGT;  Service: Pain Management;  Laterality: Right;    RADIOFREQUENCY ABLATION Left 11/3/2020    Procedure: RADIOFREQUENCY ABLATION, GENICULAR COOLED  2 OF 2;  Surgeon: Lina Wagner MD;  Location: [facility] PAIN MGT;  Service: Pain Management;  Laterality: Left;    RADIOFREQUENCY ABLATION Left 12/15/2020    Procedure: RADIOFREQUENCY ABLATION LEFT L2,3,4,5 1 of 2;  Surgeon: Lina Wagner MD;  Location: [facility] PAIN MGT;  Service: Pain Management;  Laterality: Left;    RADIOFREQUENCY ABLATION Right 12/29/2020    Procedure: RADIOFREQUENCY ABLATION RIGHT L2,3,4,5 2 of 2;  Surgeon: Lina Wagner MD;  Location: [facility] PAIN MGT;  Service: Pain Management;  Laterality: Right;    RADIOFREQUENCY ABLATION Left 6/29/2021    Procedure: RADIOFREQUENCY ABLATION GENICULAR COOLED;  Surgeon: Lina Wagner MD;  Location: [facility] PAIN MGT;  Service: Pain Management;  Laterality: Left;  1 of 2    RADIOFREQUENCY ABLATION Right 7/13/2021    Procedure: RADIOFREQUENCY ABLATION GENICULAR;  Surgeon: Lina Wagner MD;  Location: [facility] PAIN MGT;  Service: Pain Management;  Laterality: Right;  2 of 2    RADIOFREQUENCY ABLATION Right 8/24/2021    Procedure: RADIOFREQUENCY ABLATION, L2-L3-L4-L5 MEDIAL BRANCH 1 OF 2;  Surgeon: Lina Wagner MD;  Location: [facility] PAIN MGT;  Service: Pain Management;  Laterality: Right;    RADIOFREQUENCY ABLATION Left 9/11/2021    Procedure: RADIOFREQUENCY ABLATION, L2-L3-L4-L5 MEDIAL BR ANCH 2 OF 2;  Surgeon: Lina Wagner MD;  Location: [facility] PAIN MGT;  Service: Pain Management;  Laterality: Left;    RADIOFREQUENCY ABLATION Right 3/7/2022    Procedure: RADIOFREQUENCY ABLATION RIGHT L3,4,5 1 of 2, needs consent;  Surgeon: Israel Hurtado MD;  Location: [facility] PAIN MGT;   Service: Pain Management;  Laterality: Right;    RADIOFREQUENCY ABLATION Left 3/21/2022    Procedure: RADIOFREQUENCY ABLATION RIGHT L3,4,5 2 of 2, needs consent;  Surgeon: Israel Hurtado MD;  Location: [facility] PAIN MGT;  Service: Pain Management;  Laterality: Left;    RADIOFREQUENCY ABLATION Right 5/9/2022    Procedure: RADIOFREQUENCY ABLATION RIGHT GENICULAR COOLED 1 of 2;  Surgeon: Israel Hurtado MD;  Location: [facility] PAIN MGT;  Service: Pain Management;  Laterality: Right;    RADIOFREQUENCY ABLATION Left 5/23/2022    Procedure: RADIOFREQUENCY ABLATION LEFT GENICULAR COOLED  2 of 2;  Surgeon: Israel Hurtado MD;  Location: [facility] PAIN MGT;  Service: Pain Management;  Laterality: Left;    RADIOFREQUENCY ABLATION OF LUMBAR MEDIAL BRANCH NERVE AT SINGLE LEVEL Left 6/26/2018    Procedure: RADIOFREQUENCY ABLATION, NERVE, MEDIAL BRANCH, LUMBAR, 1 LEVEL;  Surgeon: Lina Wagner MD;  Location: [facility] PAIN MGT;  Service: Pain Management;  Laterality: Left;  Left Cooled RFA @ L2,3,4,5  [id number]  with Sedation    1 of 2    RADIOFREQUENCY ABLATION OF LUMBAR MEDIAL BRANCH NERVE AT SINGLE LEVEL Right 7/10/2018    Procedure: RADIOFREQUENCY ABLATION, NERVE, MEDIAL BRANCH, LUMBAR, 1 LEVEL;  Surgeon: Lina Wagner MD;  Location: [facility] PAIN MGT;  Service: Pain Management;  Laterality: Right;  RIght Cooled RFA @ L2,3,4,5  [id number] with Sedation    2 of 2    TRIGGER FINGER RELEASE Right 2017    TRIGGER FINGER RELEASE Left 7/29/2019    Procedure: RELEASE, TRIGGER FINGER, Left Thumb;  Surgeon: Velma Garcia MD;  Location: [facility] OR;  Service: Orthopedics;  Laterality: Left;  Local w/ MAC     Family History   Problem Relation Age of Onset    Diabetes Mother     Cataracts Mother     Diabetes Father     Cataracts Father     Hypertension Sister     Diabetes Sister     No Known Problems Sister     Cancer Sister         lymphoma     Coronary artery disease Brother     Diabetes Brother     Diabetes Brother      Hypotension Brother     Kidney failure Brother     Hypotension Brother     Amblyopia Neg Hx     Blindness Neg Hx     Glaucoma Neg Hx     Macular degeneration Neg Hx     Retinal detachment Neg Hx     Strabismus Neg Hx     Stroke Neg Hx     Thyroid disease Neg Hx     Anesthesia problems Neg Hx      Social History     Tobacco Use    Smoking status: Never Smoker    Smokeless tobacco: Never Used   Substance Use Topics    Alcohol use: Yes     Comment: occasionally     Drug use: No     Review of Systems   Constitutional: Negative for activity change, chills and fever.   HENT: Negative for congestion, ear pain and sore throat.    Respiratory: Positive for shortness of breath. Negative for stridor.    Cardiovascular: Negative for chest pain and palpitations.   Gastrointestinal: Negative for abdominal pain, nausea and vomiting.   Genitourinary: Negative for dysuria.   Musculoskeletal: Negative for back pain.   Skin: Negative for rash.   Neurological: Negative for dizziness, syncope, weakness and headaches.   Hematological: Does not bruise/bleed easily.       Physical Exam     Initial Vitals [06/14/22 2201]   BP Pulse Resp Temp SpO2   (!) 142/74 62 14 97.6 °F (36.4 °C) 100 %      MAP       --         Physical Exam    Nursing note and vitals reviewed.  Constitutional: She appears well-developed and well-nourished. She is not diaphoretic. She is Obese . No distress.   HENT:   Head: Normocephalic and atraumatic.   Right Ear: External ear normal.   Left Ear: External ear normal.   Neck: Neck supple.   Cardiovascular: Normal rate, regular rhythm, normal heart sounds and intact distal pulses.   Pulmonary/Chest: Breath sounds normal. No respiratory distress. She has no wheezes. She has no rhonchi. She has no rales.   Breath sounds clear to auscultation bilaterally.   Abdominal: Abdomen is soft. She exhibits no distension. There is no abdominal tenderness.   Abdominal binder in place. Bilateral VANI drains in place  draining blood. Midline abdominal surgical incision. Staples present across lower abdomen There is no rebound and no guarding.   Musculoskeletal:      Cervical back: Neck supple.     Neurological: She is alert and oriented to person, place, and time. GCS score is 15. GCS eye subscore is 4. GCS verbal subscore is 5. GCS motor subscore is 6.   Skin: Skin is warm. Capillary refill takes less than 2 seconds. No rash noted.   Psychiatric: She has a normal mood and affect.         ED Course   Procedures  Labs Reviewed   CBC W/ AUTO DIFFERENTIAL - Abnormal; Notable for the following components:       Result Value    RBC 3.98 (*)      (*)     MCH 31.9 (*)     Gran # (ANC) 10.3 (*)     Lymph # 0.5 (*)     Gran % 92.0 (*)     Lymph % 4.1 (*)     Mono % 3.5 (*)     All other components within normal limits   COMPREHENSIVE METABOLIC PANEL - Abnormal; Notable for the following components:    CO2 22 (*)     Glucose 190 (*)     All other components within normal limits   ISTAT PROCEDURE - Abnormal; Notable for the following components:    POC PH 7.255 (*)     POC PCO2 64.6 (*)     POC PO2 24 (*)     POC HCO3 28.7 (*)     POC SATURATED O2 32 (*)     POC TCO2 31 (*)     All other components within normal limits   ISTAT PROCEDURE - Abnormal; Notable for the following components:    POC PH 7.247 (*)     POC PCO2 60.5 (*)     POC PO2 35 (*)     POC SATURATED O2 57 (*)     All other components within normal limits   ISTAT PROCEDURE - Abnormal; Notable for the following components:    POC PH 7.253 (*)     POC PCO2 56.2 (*)     POC PO2 39 (*)     POC SATURATED O2 65 (*)     All other components within normal limits   B-TYPE NATRIURETIC PEPTIDE   MAGNESIUM   PHOSPHORUS   SARS-COV-2 RDRP GENE    Narrative:     This test utilizes isothermal nucleic acid amplification   technology to detect the SARS-CoV-2 RdRp nucleic acid segment.   The analytical sensitivity (limit of detection) is 125 genome   equivalents/mL.   A POSITIVE result  implies infection with the SARS-CoV-2 virus;   the patient is presumed to be contagious.     A NEGATIVE result means that SARS-CoV-2 nucleic acids are not   present above the limit of detection. A NEGATIVE result should be   treated as presumptive. It does not rule out the possibility of   COVID-19 and should not be the sole basis for treatment decisions.   If COVID-19 is strongly suspected based on clinical and exposure   history, re-testing using an alternate molecular assay should be   considered.   This test is only for use under the Food and Drug   Administration s Emergency Use Authorization (EUA).   Commercial kits are provided by [employer].   Performance characteristics of the EUA have been independently   verified by [facility] Department of   Pathology and Laboratory Medicine.   _________________________________________________________________   The authorized Fact Sheet for Healthcare Providers and the authorized Fact   Sheet for Patients of the ID NOW COVID-19 are available on the FDA   website:     https://www.fda.gov/media/214867/download  https://www.fda.gov/media/706805/download           TYPE & SCREEN     EKG Readings: (Independently Interpreted)   Initial Reading: No STEMI. Previous EKG: Compared with most recent EKG Previous EKG Date: 05/09/22. Rhythm: Sinus Bradycardia. Heart Rate: 58. Ectopy: No Ectopy. Conduction: Normal.     ECG Results          EKG 12-lead (Final result)  Result time 06/15/22 11:40:53    Final result by Interface, Lab In [facility] (06/15/22 11:40:53)                 Narrative:    Test Reason : R09.02,    Vent. Rate : 058 BPM     Atrial Rate : 058 BPM     P-R Int : 140 ms          QRS Dur : 074 ms      QT Int : 430 ms       P-R-T Axes : 054 016 034 degrees     QTc Int : 422 ms    Sinus bradycardia  Otherwise normal ECG  When compared with ECG of 09-MAY-2022 09:30,  No significant change was found  Confirmed by TG FLORES MD ([id number]) on 6/15/2022 11:40:48  AM    Referred By: [email or web address]   SELF           Confirmed By:TG FLORES MD                            Imaging Results          X-Ray Chest AP Portable (Final result)  Result time 06/15/22 00:35:31    Final result by Silver Majano MD (06/15/22 00:35:31)                 Impression:      Diminished depth of inspiration without additional radiographic evidence for acute intrathoracic process.      Electronically signed by: Silver Majano  Date:    06/15/2022  Time:    00:35             Narrative:    EXAMINATION:  XR CHEST AP PORTABLE    CLINICAL HISTORY:  Hypoxia;    TECHNIQUE:  Single frontal view of the chest was performed.    COMPARISON:  Chest radiograph November 15, 2021    FINDINGS:  Single chest view is submitted.  There is diminished depth of inspiration and there is mild rotation, when accounting for these factors the cardiomediastinal silhouette appears stable.  Aortic atherosclerotic change noted.    Accentuated pulmonary bronchovascular markings consistent with diminished depth of inspiration noted.  Asymmetric density on the left is thought due to soft tissue attenuation and asymmetric due to positioning.  There is no evidence for superimposed confluent infiltrate or consolidation, significant pleural effusion or pneumothorax.  The osseous structures appear intact.  Chronic change noted.                                 Medications   albuterol-ipratropium 2.5 mg-0.5 mg/3 mL nebulizer solution 3 mL (3 mLs Nebulization Given 6/14/22 0355)   methylPREDNISolone sodium succinate injection 125 mg (125 mg Intravenous Given 6/14/22 2303)     Medical Decision Making:   History:   I obtained history from: someone other than patient and EMS provider.  Old Medical Records: I decided to obtain old medical records.  Initial Assessment:   Emergent evaluation of shortness of breath. She is afebrile and hemodynamically stable.  Differential Diagnosis:   Asthma/COPD exacerbation, CHF, symptomatic anemia, COVID vs  bacterial pneumonia, pneumothorax, electrolyte abnormality  Clinical Tests:   Lab Tests: Ordered and Reviewed  Radiological Study: Ordered and Reviewed  Medical Tests: Ordered and Reviewed            Attending Attestation:   Physician Attestation Statement for Resident:  As the supervising MD   Physician Attestation Statement: I have personally seen and examined this patient.   I agree with the above history. -:   As the supervising MD I agree with the above PE.    As the supervising MD I agree with the above treatment, course, plan, and disposition.            Attending ED Notes:   STAFF ATTENDING PHYSICIAN NOTE:  I have individually/jointly evaluated Alivia Thomas and discussed their ED management with the resident physician. I have also reviewed their notes, assessments, and procedures documented.  I was present during all critical portions of any procedure(s) performed on Alivia Thomas.   ____________________  Weston Anaya MD, [facility]  Emergency Medicine Staff  12:09 AM 6/15/2022    F/U:  General surgery not covering plastic surgery leading to MICU consultation secondary to respiratory metabolic acidosis and CO2.  Narcosis.  ____________________  Weston Anaya MD, [facility]  Emergency Medicine Staff  1:43 AM 6/15/2022                   Clinical Impression:   Final diagnoses:  [R09.02] Hypoxia  [R06.89] Hypercapnia          ED Disposition Condition    Observation               Elton Betts MD  Resident  06/16/22 1417       Derek Anaya MD  06/18/22 1241

## 2022-06-15 NOTE — CONSULTS
[facility] - Emergency Dept  Critical Care Medicine  Consult Note    Patient Name: Alivia Marie Cyr  MRN: [id number]  Admission Date: 6/14/2022  Hospital Length of Stay: 0 days  Code Status: Prior  Attending Physician: Guicho Laguna MD   Primary Care Provider: Clarisse Richardson MD   Principal Problem: Acute hypercapnic respiratory failure    Inpatient consult to Critical Care Medicine  Consult performed by: Valeria Pichardo NP  Consult ordered by: Derek Anaya MD        Subjective:     HPI:  Patient is a 56 y.o. female with significant past medical history of PADDY, morbid obesity, DM II, DDD, depression, GERD and asthma presented to hospital complaining of shortness of breath.     The patient underwent panniculectomy on 6/14. She was discharged from the PACU at 7:45p and per the patient, she received percocet prior to being discharged home. Once the patient arrived home, she became short of breath with sats in the mid 80s on home pulse ox. The patient's  called an [facility] nurse triage line who instructed the  to call 911.     The patient presented to the ED ~ 22:30. She was found to have a respiratory acidosis and was placed on bipap. All other labs are grossly WNL. Critical Care Medicine was consulted for hypercapnic respiratory failure. At time of evaluation, the patient is alert, speaking full sentences without difficulty and answering advanced orientation questions. She has been taken off bipap during CCM evaluation and is doing well on room air.         Hospital/ICU Course:  No notes on file    Past Medical History:   Diagnosis Date    Allergy     Anemia     Asthma     Bilateral shoulder bursitis     Cervical stenosis of spine     Chronic pain     DDD (degenerative disc disease), cervical     Depression 1/28/2019    Diabetes mellitus type II     DJD (degenerative joint disease) of knee 6/19/2014    Facet arthritis of lumbar region 12/17/2015    Facet syndrome 12/17/2015    GERD  (gastroesophageal reflux disease)     Heartburn     Hyperlipidemia     Hypertension     Morbid obesity     Neuromuscular disorder     PADDY (obstructive sleep apnea)     Proteinuria     Right carpal tunnel syndrome     Sacroiliitis 6/13/2018    Sacroiliitis     Sleep apnea        Past Surgical History:   Procedure Laterality Date    CARPAL TUNNEL RELEASE      CARPAL TUNNEL RELEASE  1980s    left    CARPAL TUNNEL RELEASE  2012    right    COLONOSCOPY N/A 6/15/2020    Procedure: COLONOSCOPY;  Surgeon: Jc Koo MD;  Location: [facility] (4TH FLR);  Service: Endoscopy;  Laterality: N/A;  COVID screening on 6/13/20 at [facility]  pt updated on drop off location and no visitor policy-    ESOPHAGOGASTRODUODENOSCOPY N/A 3/27/2019    Procedure: EGD (ESOPHAGOGASTRODUODENOSCOPY);  Surgeon: Robbin Bar MD;  Location: [facility] (2ND FLR);  Service: Endoscopy;  Laterality: N/A;  BMI 61.3/2nd floor case/svn    INJECTION OF JOINT Bilateral 6/9/2020    Procedure: INJECTION, JOINT, BILATERAL SI;  Surgeon: Lina Wagner MD;  Location: [facility];  Service: Pain Management;  Laterality: Bilateral;  B/L SI Joint Injection    INJECTION OF JOINT Bilateral 5/11/2021    Procedure: INJECTION, JOINT, SACROILIAC (SI) need consent;  Surgeon: Lina Wagner MD;  [facility];  Service: Pain Management;  Laterality: Bilateral;    JOINT REPLACEMENT Bilateral     with 2 revisions on rt    KNEE SURGERY  3/2010    orthroscope    KNEE SURGERY  6-19-14    left TKR    LAPAROSCOPIC SLEEVE GASTRECTOMY N/A 8/28/2019    Procedure: GASTRECTOMY, SLEEVE, LAPAROSCOPIC with intraop EGD;  Surgeon: Heriberto Clements MD;  Location: [facility] OR 2ND FLR;  Service: General;  Laterality: N/A;    RADIOFREQUENCY ABLATION Right 7/9/2019    Procedure: RADIOFREQUENCY ABLATION;  Surgeon: Lina Wagner MD;  Location: [facility];  Service: Pain Management;  Laterality: Right;  RIGHT RFA L2,3,4,5  2 of 2     RADIOFREQUENCY ABLATION Left 12/19/2019    Procedure: RADIOFREQUENCY ABLATION, LEFT L2-L3-L4-L5 MEDIAL BRANCH 1 OF 2;  Surgeon: Lina Wagner MD;  Location: [facility];  Service: Pain Management;  Laterality: Left;    RADIOFREQUENCY ABLATION Right 12/31/2019    Procedure: RADIOFREQUENCY ABLATION, RIGHT L2-L3-L4-L5  2 OF 2;  Surgeon: Lina Wagner MD;  Location: [facility];  Service: Pain Management;  Laterality: Right;    RADIOFREQUENCY ABLATION Right 10/13/2020    Procedure: RADIOFREQUENCY ABLATION, Genicular Cooled 1 of 2;  Surgeon: Lina Wagner MD;  Location: [facility];  Service: Pain Management;  Laterality: Right;    RADIOFREQUENCY ABLATION Left 11/3/2020    Procedure: RADIOFREQUENCY ABLATION, GENICULAR COOLED  2 OF 2;  Surgeon: Lina Wagner MD;  Location: [facility];  Service: Pain Management;  Laterality: Left;    RADIOFREQUENCY ABLATION Left 12/15/2020    Procedure: RADIOFREQUENCY ABLATION LEFT L2,3,4,5 1 of 2;  Surgeon: Lina Wagner MD;  Location: [facility];  Service: Pain Management;  Laterality: Left;    RADIOFREQUENCY ABLATION Right 12/29/2020    Procedure: RADIOFREQUENCY ABLATION RIGHT L2,3,4,5 2 of 2;  Surgeon: Lina Wagner MD;  Location: [facility];  Service: Pain Management;  Laterality: Right;    RADIOFREQUENCY ABLATION Left 6/29/2021    Procedure: RADIOFREQUENCY ABLATION GENICULAR COOLED;  Surgeon: Lina Wagner MD;  Location: [facility];  Service: Pain Management;  Laterality: Left;  1 of 2    RADIOFREQUENCY ABLATION Right 7/13/2021    Procedure: RADIOFREQUENCY ABLATION GENICULAR;  Surgeon: Lina Wagner MD;  Location: [facility];  Service: Pain Management;  Laterality: Right;  2 of 2    RADIOFREQUENCY ABLATION Right 8/24/2021    Procedure: RADIOFREQUENCY ABLATION, L2-L3-L4-L5 MEDIAL BRANCH 1 OF 2;  Surgeon: Lina Wagner MD;  Location: [facility];  Service: Pain Management;  Laterality: Right;    RADIOFREQUENCY  ABLATION Left 9/11/2021    Procedure: RADIOFREQUENCY ABLATION, L2-L3-L4-L5 MEDIAL BR ANCH 2 OF 2;  Surgeon: Lina Wagner MD;  Location: [facility]GT;  Service: Pain Management;  Laterality: Left;    RADIOFREQUENCY ABLATION Right 3/7/2022    Procedure: RADIOFREQUENCY ABLATION RIGHT L3,4,5 1 of 2, needs consent;  Surgeon: Israel Hurtado MD;  Location: [facility];  Service: Pain Management;  Laterality: Right;    RADIOFREQUENCY ABLATION Left 3/21/2022    Procedure: RADIOFREQUENCY ABLATION RIGHT L3,4,5 2 of 2, needs consent;  Surgeon: Israel Hurtado MD;  Location: [facility];  Service: Pain Management;  Laterality: Left;    RADIOFREQUENCY ABLATION Right 5/9/2022    Procedure: RADIOFREQUENCY ABLATION RIGHT GENICULAR COOLED 1 of 2;  Surgeon: Israel Hurtado MD;  Location: [facility];  Service: Pain Management;  Laterality: Right;    RADIOFREQUENCY ABLATION Left 5/23/2022    Procedure: RADIOFREQUENCY ABLATION LEFT GENICULAR COOLED  2 of 2;  Surgeon: Israel Hurtado MD;  Location: [facility];  Service: Pain Management;  Laterality: Left;    RADIOFREQUENCY ABLATION OF LUMBAR MEDIAL BRANCH NERVE AT SINGLE LEVEL Left 6/26/2018    Procedure: RADIOFREQUENCY ABLATION, NERVE, MEDIAL BRANCH, LUMBAR, 1 LEVEL;  Surgeon: Lina Wagner MD;  Location: [facility];  Service: Pain Management;  Laterality: Left;  Left Cooled RFA @ L2,3,4,5  [id number]  with Sedation    1 of 2    RADIOFREQUENCY ABLATION OF LUMBAR MEDIAL BRANCH NERVE AT SINGLE LEVEL Right 7/10/2018    Procedure: RADIOFREQUENCY ABLATION, NERVE, MEDIAL BRANCH, LUMBAR, 1 LEVEL;  Surgeon: Lina Wagner MD;  Location: [facility];  Service: Pain Management;  Laterality: Right;  RIght Cooled RFA @ L2,3,4,5  [id number] with Sedation    2 of 2    TRIGGER FINGER RELEASE Right 2017    TRIGGER FINGER RELEASE Left 7/29/2019    Procedure: RELEASE, TRIGGER FINGER, Left Thumb;  Surgeon: Velma Garcia MD;  Location: [facility] OR;  Service: Orthopedics;   Laterality: Left;  Local w/ MAC       Review of patient's allergies indicates:   Allergen Reactions    Strawberry Anaphylaxis       Family History       Problem Relation (Age of Onset)    Cancer Sister    Cataracts Mother, Father    Coronary artery disease Brother    Diabetes Mother, Father, Sister, Brother, Brother    Hypertension Sister    Hypotension Brother, Brother    Kidney failure Brother    No Known Problems Sister          Tobacco Use    Smoking status: Never Smoker    Smokeless tobacco: Never Used   Substance and Sexual Activity    Alcohol use: Yes     Comment: occasionally     Drug use: No    Sexual activity: Yes     Partners: Female     Birth control/protection: None      Review of Systems   Constitutional:  Negative for activity change, chills, fever and unexpected weight change.   HENT:  Negative for sinus pain and sore throat.    Eyes:  Negative for pain and visual disturbance.   Respiratory:  Positive for shortness of breath. Negative for choking, wheezing and stridor.    Cardiovascular:  Negative for chest pain, palpitations and leg swelling.   Gastrointestinal:  Positive for abdominal pain. Negative for abdominal distention, blood in stool, diarrhea, nausea and vomiting.   Genitourinary:  Negative for dysuria and hematuria.   Skin:  Negative for rash.   Neurological:  Negative for dizziness, weakness, light-headedness and headaches.   Objective:     Vital Signs (Most Recent):  Temp: 97.6 °F (36.4 °C) (06/14/22 2201)  Pulse: 77 (06/15/22 0132)  Resp: 20 (06/15/22 0002)  BP: (!) 160/76 (06/15/22 0132)  SpO2: 97 % (06/15/22 0132)   Vital Signs (24h Range):  Temp:  [97.2 °F (36.2 °C)-97.8 °F (36.6 °C)] 97.6 °F (36.4 °C)  Pulse:  [57-81] 77  Resp:  [10-21] 20  SpO2:  [96 %-100 %] 97 %  BP: (137-172)/(65-82) 160/76      There is no height or weight on file to calculate BMI.    No intake or output data in the 24 hours ending 06/15/22 0207    Physical Exam  Vitals and nursing note reviewed.    Constitutional:       General: She is not in acute distress.     Appearance: Normal appearance. She is not toxic-appearing.   HENT:      Mouth/Throat:      Mouth: Mucous membranes are moist.   Eyes:      Extraocular Movements: Extraocular movements intact.      Pupils: Pupils are equal, round, and reactive to light.   Cardiovascular:      Rate and Rhythm: Normal rate and regular rhythm.      Pulses: Normal pulses.      Heart sounds: Normal heart sounds. No murmur heard.  Pulmonary:      Effort: Pulmonary effort is normal. No respiratory distress.      Breath sounds: Normal breath sounds. No wheezing or rales.   Abdominal:      Palpations: Abdomen is soft.      Comments: Abdominal binder in place with some blood noted. Incision site above clean  and intact.   VANI drains bilaterally with bloody drainage   Musculoskeletal:      Right lower leg: No edema.      Left lower leg: No edema.   Skin:     General: Skin is warm and dry.      Capillary Refill: Capillary refill takes less than 2 seconds.   Neurological:      General: No focal deficit present.      Mental Status: She is alert and oriented to person, place, and time.      Motor: No weakness.       Vents:  Oxygen Concentration (%): 30 (06/14/22 2325)  Lines/Drains/Airways       Drain  Duration                  Closed/Suction Drain Left;Ventral Hip 15 Fr. -- days         Closed/Suction Drain 06/14/22 1831 Right Hip 15 Fr. <1 day              Peripheral Intravenous Line  Duration                  Peripheral IV - Single Lumen 06/14/22 2233 20 G Right Antecubital <1 day                  Significant Labs:    CBC/Anemia Profile:  Recent Labs   Lab 06/14/22  2232   WBC 11.18   HGB 12.7   HCT 39.7      *   RDW 13.6        Chemistries:  Recent Labs   Lab 06/15/22  0005      K 3.6      CO2 22*   BUN 13   CREATININE 0.8   CALCIUM 9.3   ALBUMIN 3.7   PROT 7.4   BILITOT 0.7   ALKPHOS 57   ALT 15   AST 15   MG 1.7   PHOS 3.4       ABGs:   Recent Labs    Lab 06/15/22  0118   PH 7.253*   PCO2 56.2*   HCO3 24.8   POCSATURATED 65*   BE -2       Significant Imaging: I have reviewed all pertinent imaging results/findings within the past 24 hours.      ABG  Recent Labs   Lab 06/15/22  0118   PH 7.253*   PO2 39*   PCO2 56.2*   HCO3 24.8   BE -2     Assessment/Plan:     Pulmonary  * Acute hypercapnic respiratory failure  Patient  with Hypercapnic Respiratory failure which is Acute.  she is not on home oxygen. Supplemental oxygen was provided and noted- Oxygen Concentration (%):  [30] 30.   Signs/symptoms of respiratory failure include- tachypnea and increased work of breathing. Contributing diagnoses includes - hypoventilation  Labs and images were reviewed. Patient Has recent ABG, which has been reviewed. Will treat underlying causes and adjust management of respiratory failure as follows-     Patient with PMHx significant for PADDY not on home CPAP, s/p panniculectomy earlier today, who presented couple of hours after she got home with SOB. She found somnolence with respiratory acidosis and acute respiratory failure. VBG PH 7.25, PCO2 64.6. she was placed on BiPAP with significant improvement of the acidemia and she is fully oriented and conversional. Patient is currently satting high 90s on room air and has zero signs of CO2 narcosis.     Plan :   -- Admit to stepdown unit for close monitoring of respiratory status   -- Bipap prn; currently doing well on room air  --  about the benefit of CPAP QHS (h/o PADDY), but pt declined   -- Avoid opioid or BZ, use with cation if needed                     Patient doing well on room air. No ICU needs at this time. Stable for admission to Hospital Medicine.     Case discussed with CCM Fellow Dr. Harman Garcia.     Thank you for your consult. I will sign off. Please contact us if you have any additional questions.     I spent >50 minutes reviewing patient records, examining, and counseling the patient with greater than 50% of  the time spent with direct patient care and coordination.      Valeria Pichardo NP  Critical Care Medicine  [facility] - Emergency Dept

## 2022-06-15 NOTE — NURSING
Patient being discharged home- discharged instructions reviewed with patient. Verbalized understanding. Waiting for ride- report given to night nurse.

## 2022-06-22 ENCOUNTER — OFFICE VISIT (OUTPATIENT)
Dept: PLASTIC SURGERY | Facility: CLINIC | Age: 57
End: 2022-06-22
Payer: MEDICARE

## 2022-06-22 VITALS
HEART RATE: 104 BPM | BODY MASS INDEX: 38.99 KG/M2 | HEIGHT: 65 IN | SYSTOLIC BLOOD PRESSURE: 108 MMHG | WEIGHT: 234 LBS | DIASTOLIC BLOOD PRESSURE: 67 MMHG

## 2022-06-22 DIAGNOSIS — Z09 SURGERY FOLLOW-UP EXAMINATION: Primary | ICD-10-CM

## 2022-06-22 PROCEDURE — 99214 OFFICE O/P EST MOD 30 MIN: CPT | Mod: PBBFAC | Performed by: SURGERY

## 2022-06-22 PROCEDURE — 99999 PR PBB SHADOW E&M-EST. PATIENT-LVL IV: ICD-10-PCS | Mod: PBBFAC,,, | Performed by: SURGERY

## 2022-06-22 PROCEDURE — 99024 PR POST-OP FOLLOW-UP VISIT: ICD-10-PCS | Mod: POP,,, | Performed by: SURGERY

## 2022-06-22 PROCEDURE — 99024 POSTOP FOLLOW-UP VISIT: CPT | Mod: POP,,, | Performed by: SURGERY

## 2022-06-22 PROCEDURE — 99999 PR PBB SHADOW E&M-EST. PATIENT-LVL IV: CPT | Mod: PBBFAC,,, | Performed by: SURGERY

## 2022-06-22 NOTE — PROGRESS NOTES
Surgery Clinic Note    Subjective:     Alivia Thomas is a 56 y.o. female who presents to clinic for follow up s/p panniculectomy on 6/14/22. She denies fever or chills. She denies pain. She denies drainage or redness to her incisions. Her drains have been putting out about 100-120mL/day. She went to the ED after surgery for shortness of breath with desaturations down into the 70s. Workup was unremarkable, however she was admitted and placed on BiPAP. She was discharged the next day.        PMH:   Past Medical History:   Diagnosis Date    Allergy     Anemia     Asthma     Bilateral shoulder bursitis     Cervical stenosis of spine     Chronic pain     DDD (degenerative disc disease), cervical     Depression 1/28/2019    Diabetes mellitus type II     DJD (degenerative joint disease) of knee 6/19/2014    Facet arthritis of lumbar region 12/17/2015    Facet syndrome 12/17/2015    GERD (gastroesophageal reflux disease)     Heartburn     Hyperlipidemia     Hypertension     Morbid obesity     Neuromuscular disorder     PADDY (obstructive sleep apnea)     Proteinuria     Right carpal tunnel syndrome     Sacroiliitis 6/13/2018    Sacroiliitis     Sleep apnea        Medications:    Current Outpatient Medications on File Prior to Visit   Medication Sig Dispense Refill    albuterol (VENTOLIN HFA) 90 mcg/actuation inhaler INHALE TWO PUFFS INTO LUNGS EVERY 4 TO 6 HOURS AS NEEDED FOR SHORTNESS OF BREATH AND FOR WHEEZING 18 g 1    allopurinoL (ZYLOPRIM) 300 MG tablet Take 1 tablet (300 mg total) by mouth 2 (two) times daily. 180 tablet 3    atorvastatin (LIPITOR) 20 MG tablet Take 1 tablet (20 mg total) by mouth once daily. 90 tablet 3    b complex vitamins tablet Take 1 tablet by mouth every other day.       calcium citrate/vitamin D2 (ISIAH-CITRATE ORAL) Take 500 mg by mouth 2 (two) times daily.      clindamycin (CLEOCIN) 300 MG capsule Take 1 capsule (300 mg total) by mouth every 8 (eight) hours. for  7 days 21 capsule 0    colchicine (MITIGARE) 0.6 mg Cap Take 1 capsule (0.6 mg total) by mouth daily as needed (flare up). 60 capsule 3    cyanocobalamin (VITAMIN B-12) 1000 MCG tablet Take 100 mcg by mouth every morning.      diclofenac sodium (VOLTAREN) 1 % Gel Apply 2 g topically 4 (four) times daily as needed. To painful area on the feet. 500 g 5    FLUoxetine (PROZAC) 20 mg/5 mL (4 mg/mL) solution Take 5 mLs (20 mg total) by mouth once daily. 450 mL 3    fluticasone propionate (FLONASE) 50 mcg/actuation nasal spray 1 spray (50 mcg total) by Each Nostril route 2 (two) times daily as needed for Rhinitis. 15 g 6    indomethacin (INDOCIN) 50 MG capsule TAKE 1 CAPSULE BY MOUTH 2 TIMES DAILY WITH MEALS 30 capsule 2    LIDOcaine (XYLOCAINE) 5 % Oint ointment Apply topically as needed.      MULTIVIT-IRON-MIN-FOLIC ACID 3,500-18-0.4 UNIT-MG-MG ORAL CHEW Take 1 tablet by mouth 2 (two) times daily.       nystatin (MYCOSTATIN) powder Apply topically 2 (two) times daily. 60 g 3    omeprazole (PRILOSEC) 20 MG capsule Take 1 capsule (20 mg total) by mouth every morning. 90 capsule 3    oxybutynin (DITROPAN-XL) 5 MG TR24 Take 1 tablet (5 mg total) by mouth once daily. 90 tablet 3    oxyCODONE-acetaminophen (PERCOCET)  mg per tablet Take 1 tablet by mouth every 8 (eight) hours as needed for Pain. 90 tablet 0    [] oxyCODONE-acetaminophen (PERCOCET) 5-325 mg per tablet Take 1 tablet by mouth every 6 (six) hours as needed for Pain. 28 tablet 0    vitamin D 185 MG Tab Take 5,000 mg by mouth every morning.        Current Facility-Administered Medications on File Prior to Visit   Medication Dose Route Frequency Provider Last Rate Last Admin    0.9%  NaCl infusion   Intravenous Continuous Lina Wagner MD 25 mL/hr at 12/15/20 1506 25 mL/hr at 12/15/20 1506         Objective:     PHYSICAL EXAM:  Vital Signs (Most Recent)  Pulse: 104 (22 1002)  BP: 108/67 (22 1002)    ROS A 10+ review of  systems was performed with pertinent positives and negatives noted above in the history of present illness.  Other systems were negative unless otherwise specified.    Physical Exam:  Physical Exam  Constitutional:       General: She is not in acute distress.  HENT:      Head: Normocephalic and atraumatic.   Eyes:      Pupils: Pupils are equal, round, and reactive to light.   Cardiovascular:      Rate and Rhythm: Normal rate and regular rhythm.   Pulmonary:      Effort: Pulmonary effort is normal. No respiratory distress.   Abdominal:      Palpations: Abdomen is soft.      Comments: Incisions are c/d/i with staples in place on the lateral edges. Drains with serous output.   Skin:     General: Skin is warm and dry.      Capillary Refill: Capillary refill takes less than 2 seconds.   Neurological:      General: No focal deficit present.      Mental Status: She is alert and oriented to person, place, and time.               Assessment:     56 y.o. female 1 week s/p panniculectomy.    Plan:     - No drains removed today. Both were secured with tegaderms  - Follow up in 1 week      Shirley Ferguson MD   [facility]

## 2022-06-26 ENCOUNTER — HOSPITAL ENCOUNTER (EMERGENCY)
Facility: HOSPITAL | Age: 57
Discharge: HOME | End: 2022-06-27
Attending: EMERGENCY MEDICINE
Payer: MEDICARE

## 2022-06-26 DIAGNOSIS — Z48.89 ENCOUNTER FOR POST SURGICAL WOUND CHECK: Primary | ICD-10-CM

## 2022-06-26 PROCEDURE — 99282 PR EMERGENCY DEPT VISIT,LEVEL II: ICD-10-PCS | Mod: GC,,, | Performed by: EMERGENCY MEDICINE

## 2022-06-26 PROCEDURE — 99282 EMERGENCY DEPT VISIT SF MDM: CPT | Mod: GC,,, | Performed by: EMERGENCY MEDICINE

## 2022-06-26 PROCEDURE — 99282 EMERGENCY DEPT VISIT SF MDM: CPT

## 2022-06-27 VITALS
WEIGHT: 234 LBS | DIASTOLIC BLOOD PRESSURE: 59 MMHG | OXYGEN SATURATION: 100 % | SYSTOLIC BLOOD PRESSURE: 109 MMHG | HEIGHT: 65 IN | TEMPERATURE: 98 F | BODY MASS INDEX: 38.99 KG/M2 | HEART RATE: 66 BPM | RESPIRATION RATE: 16 BRPM

## 2022-06-27 NOTE — ED PROVIDER NOTES
Encounter Date: 6/26/2022       History     Chief Complaint   Patient presents with    Post-op Problem     Had panniculectomy on 6/14, has 2 drains, reports serosanguinous drainage around incision site. Denies fever.     Alivia Thomas is a 57 y/o F who had a panniculectomy on 6/14 who presents for drainage around her drain insertion site. She notes serosanguinous fluid from her right drain site today that soaked through her clothes. She denies pain, fever/chills. She has had no issues with her left drain. She is feeling well and has no other complaints. She has a follow up appointment scheduled on Wednesday.         Review of patient's allergies indicates:   Allergen Reactions    Strawberry Anaphylaxis     Past Medical History:   Diagnosis Date    Allergy     Anemia     Asthma     Bilateral shoulder bursitis     Cervical stenosis of spine     Chronic pain     DDD (degenerative disc disease), cervical     Depression 1/28/2019    Diabetes mellitus type II     DJD (degenerative joint disease) of knee 6/19/2014    Facet arthritis of lumbar region 12/17/2015    Facet syndrome 12/17/2015    GERD (gastroesophageal reflux disease)     Heartburn     Hyperlipidemia     Hypertension     Morbid obesity     Neuromuscular disorder     PADDY (obstructive sleep apnea)     Proteinuria     Right carpal tunnel syndrome     Sacroiliitis 6/13/2018    Sacroiliitis     Sleep apnea      Past Surgical History:   Procedure Laterality Date    CARPAL TUNNEL RELEASE      CARPAL TUNNEL RELEASE  1980s    left    CARPAL TUNNEL RELEASE  2012    right    COLONOSCOPY N/A 6/15/2020    Procedure: COLONOSCOPY;  Surgeon: Jc Koo MD;  Location: [address]);  Service: Endoscopy;  Laterality: N/A;  COVID screening on 6/13/20 at [facility]  pt updated on drop off location and no visitor policy-rb    ESOPHAGOGASTRODUODENOSCOPY N/A 3/27/2019    Procedure: EGD (ESOPHAGOGASTRODUODENOSCOPY);  Surgeon: Robbin Anna  MD Dipika;  Location: [facility] ENDO (2ND FLR);  Service: Endoscopy;  Laterality: N/A;  BMI 61.3/2nd floor case/svn    INJECTION OF JOINT Bilateral 6/9/2020    Procedure: INJECTION, JOINT, BILATERAL SI;  Surgeon: Lina Wagner MD;  Location: [facility] PAIN MGT;  Service: Pain Management;  Laterality: Bilateral;  B/L SI Joint Injection    INJECTION OF JOINT Bilateral 5/11/2021    Procedure: INJECTION, JOINT, SACROILIAC (SI) need consent;  Surgeon: Lina Wagner MD;  Location: [facility] PAIN MGT;  Service: Pain Management;  Laterality: Bilateral;    JOINT REPLACEMENT Bilateral     with 2 revisions on rt    KNEE SURGERY  3/2010    orthroscope    KNEE SURGERY  6-19-14    left TKR    LAPAROSCOPIC SLEEVE GASTRECTOMY N/A 8/28/2019    Procedure: GASTRECTOMY, SLEEVE, LAPAROSCOPIC with intraop EGD;  Surgeon: Heriberto Clements MD;  Location: [facility] OR 2ND FLR;  Service: General;  Laterality: N/A;    PANNICULECTOMY N/A 6/14/2022    Procedure: PANNICULECTOMY;  Surgeon: Rhett Gray MD;  [facility];  Service: Plastics;  Laterality: N/A;    RADIOFREQUENCY ABLATION Right 7/9/2019    Procedure: RADIOFREQUENCY ABLATION;  Surgeon: Lina Wagner MD;  Location: [facility] PAIN MGT;  Service: Pain Management;  Laterality: Right;  RIGHT RFA L2,3,4,5  2 of 2    RADIOFREQUENCY ABLATION Left 12/19/2019    Procedure: RADIOFREQUENCY ABLATION, LEFT L2-L3-L4-L5 MEDIAL BRANCH 1 OF 2;  Surgeon: Lina Wagner MD;  Location: [facility] PAIN [facility]GT;  Service: Pain Management;  Laterality: Left;    RADIOFREQUENCY ABLATION Right 12/31/2019    Procedure: RADIOFREQUENCY ABLATION, RIGHT L2-L3-L4-L5  2 OF 2;  Surgeon: Lina Wagner MD;  Location: [facility] PAIN MGT;  Service: Pain Management;  Laterality: Right;    RADIOFREQUENCY ABLATION Right 10/13/2020    Procedure: RADIOFREQUENCY ABLATION, Genicular Cooled 1 of 2;  Surgeon: Lina Wagner MD;  Location: [facility] PAIN MGT;  Service: Pain Management;  Laterality: Right;     RADIOFREQUENCY ABLATION Left 11/3/2020    Procedure: RADIOFREQUENCY ABLATION, GENICULAR COOLED  2 OF 2;  Surgeon: Lina Wagner MD;  Location: [facility] PAIN MGT;  Service: Pain Management;  Laterality: Left;    RADIOFREQUENCY ABLATION Left 12/15/2020    Procedure: RADIOFREQUENCY ABLATION LEFT L2,3,4,5 1 of 2;  Surgeon: Lina Wagner MD;  Location: [facility] PAIN MGT;  Service: Pain Management;  Laterality: Left;    RADIOFREQUENCY ABLATION Right 12/29/2020    Procedure: RADIOFREQUENCY ABLATION RIGHT L2,3,4,5 2 of 2;  Surgeon: Lina Wagner MD;  Location: [facility] PAIN MGT;  Service: Pain Management;  Laterality: Right;    RADIOFREQUENCY ABLATION Left 6/29/2021    Procedure: RADIOFREQUENCY ABLATION GENICULAR COOLED;  Surgeon: Lina Wagner MD;  Location: [facility] PAIN MGT;  Service: Pain Management;  Laterality: Left;  1 of 2    RADIOFREQUENCY ABLATION Right 7/13/2021    Procedure: RADIOFREQUENCY ABLATION GENICULAR;  Surgeon: Lina Wagner MD;  Location: [facility] PAIN MGT;  Service: Pain Management;  Laterality: Right;  2 of 2    RADIOFREQUENCY ABLATION Right 8/24/2021    Procedure: RADIOFREQUENCY ABLATION, L2-L3-L4-L5 MEDIAL BRANCH 1 OF 2;  Surgeon: Lina Wagner MD;  Location: [facility] PAIN MGT;  Service: Pain Management;  Laterality: Right;    RADIOFREQUENCY ABLATION Left 9/11/2021    Procedure: RADIOFREQUENCY ABLATION, L2-L3-L4-L5 MEDIAL BR ANCH 2 OF 2;  Surgeon: Lina Wagner MD;  Location: [facility] PAIN MGT;  Service: Pain Management;  Laterality: Left;    RADIOFREQUENCY ABLATION Right 3/7/2022    Procedure: RADIOFREQUENCY ABLATION RIGHT L3,4,5 1 of 2, needs consent;  Surgeon: Israel Hurtado MD;  Location: [facility] PAIN MGT;  Service: Pain Management;  Laterality: Right;    RADIOFREQUENCY ABLATION Left 3/21/2022    Procedure: RADIOFREQUENCY ABLATION RIGHT L3,4,5 2 of 2, needs consent;  Surgeon: Israel Hurtado MD;  Location: [facility] PAIN MGT;  Service: Pain Management;  Laterality: Left;    RADIOFREQUENCY  ABLATION Right 5/9/2022    Procedure: RADIOFREQUENCY ABLATION RIGHT GENICULAR COOLED 1 of 2;  Surgeon: Israel Hurtado MD;  Location: [facility] PAIN MGT;  Service: Pain Management;  Laterality: Right;    RADIOFREQUENCY ABLATION Left 5/23/2022    Procedure: RADIOFREQUENCY ABLATION LEFT GENICULAR COOLED  2 of 2;  Surgeon: Israel Hurtado MD;  Location: [facility] PAIN MGT;  Service: Pain Management;  Laterality: Left;    RADIOFREQUENCY ABLATION OF LUMBAR MEDIAL BRANCH NERVE AT SINGLE LEVEL Left 6/26/2018    Procedure: RADIOFREQUENCY ABLATION, NERVE, MEDIAL BRANCH, LUMBAR, 1 LEVEL;  Surgeon: Lina Wagner MD;  Location: [facility] PAIN MGT;  Service: Pain Management;  Laterality: Left;  Left Cooled RFA @ L2,3,4,5  [id number]  with Sedation    1 of 2    RADIOFREQUENCY ABLATION OF LUMBAR MEDIAL BRANCH NERVE AT SINGLE LEVEL Right 7/10/2018    Procedure: RADIOFREQUENCY ABLATION, NERVE, MEDIAL BRANCH, LUMBAR, 1 LEVEL;  Surgeon: Lina Wagner MD;  Location: [facility] PAIN MGT;  Service: Pain Management;  Laterality: Right;  RIght Cooled RFA @ L2,3,4,5  [id number] with Sedation    2 of 2    TRIGGER FINGER RELEASE Right 2017    TRIGGER FINGER RELEASE Left 7/29/2019    Procedure: RELEASE, TRIGGER FINGER, Left Thumb;  Surgeon: Velma Garcia MD;  Location: [facility] OR;  Service: Orthopedics;  Laterality: Left;  Local w/ MAC     Family History   Problem Relation Age of Onset    Diabetes Mother     Cataracts Mother     Diabetes Father     Cataracts Father     Hypertension Sister     Diabetes Sister     No Known Problems Sister     Cancer Sister         lymphoma     Coronary artery disease Brother     Diabetes Brother     Diabetes Brother     Hypotension Brother     Kidney failure Brother     Hypotension Brother     Amblyopia Neg Hx     Blindness Neg Hx     Glaucoma Neg Hx     Macular degeneration Neg Hx     Retinal detachment Neg Hx     Strabismus Neg Hx     Stroke Neg Hx     Thyroid disease Neg Hx     Anesthesia  problems Neg Hx      Social History     Tobacco Use    Smoking status: Never Smoker    Smokeless tobacco: Never Used   Substance Use Topics    Alcohol use: Yes     Comment: occasionally     Drug use: No     Review of Systems   Constitutional: Negative for chills and fever.   HENT: Negative for sore throat and trouble swallowing.    Eyes: Negative for visual disturbance.   Respiratory: Negative for cough and shortness of breath.    Cardiovascular: Negative for chest pain and palpitations.   Gastrointestinal: Negative for abdominal pain, constipation, diarrhea, nausea and vomiting.   Genitourinary: Negative for dysuria and hematuria.   Musculoskeletal: Negative for back pain and gait problem.   Neurological: Negative for syncope and speech difficulty.   Psychiatric/Behavioral: Negative for agitation and confusion.       Physical Exam     Initial Vitals [06/26/22 2245]   BP Pulse Resp Temp SpO2   120/71 74 16 98 °F (36.7 °C) 99 %      MAP       --         Physical Exam    Nursing note and vitals reviewed.  Constitutional: She appears well-developed and well-nourished. She is not diaphoretic. No distress.   HENT:   Head: Normocephalic and atraumatic.   Eyes: Conjunctivae and EOM are normal.   Neck:   Normal range of motion.  Cardiovascular: Normal rate and regular rhythm.   No murmur heard.  Pulmonary/Chest: Breath sounds normal. She has no wheezes. She has no rhonchi. She has no rales.   Abdominal: Abdomen is soft. Bowel sounds are normal. She exhibits no distension. There is no abdominal tenderness.   Musculoskeletal:           General: Normal range of motion.      Cervical back: Normal range of motion.     Neurological: She is alert and oriented to person, place, and time.   Skin: Skin is warm and dry.   No erythema or tenderness around panniculectomy incision site or BL drain insertion sites. Sutures intact around drains. No active drainage around drains, however pt reports that she just cleaned the right drain  site.          ED Course   Procedures  Labs Reviewed - No data to display       Imaging Results    None          Medications - No data to display  Medical Decision Making:   Initial Assessment:   57 y/o F with recent panniculectomy who presents with serosanguinous drainage around R drain insertion site.   Differential Diagnosis:   Blockage in drain tubing, drain malfunction, infection   ED Management:  Discussed with plastic surgery, who recommended milking the drain as fluid drainage is likely due to blockage in drain tubing.  Drain milked, with clotted blood cleared from tubing. Pt instructed on drain care. Will discharge home, pt to follow up with plastic surgery on Wednesday.                      Clinical Impression:   Final diagnoses:  [Z48.89] Encounter for post surgical wound check (Primary)          ED Disposition Condition    Discharge Stable        ED Prescriptions     None        Follow-up Information     Follow up With Specialties Details Why Contact [facility] - Emergency Dept Emergency Medicine  If symptoms worsen [id number]  [facility] [id number]  [phone]           Brianda Mitchell MD  Resident  06/27/22 0018

## 2022-06-27 NOTE — ED NOTES
"Patient identifiers verified and correct for Alivia Thomas    Pt to ED with c/o drainage from abdominal drains. Pt had "tummy tuck" procedure almost two weeks ago.This RN notes a slight amount of clear drainage on to skin from both drains. No redness, swelling noted. Pt does not have fever. Pt denies pain.    LOC: The patient is awake, alert and aware of environment with an appropriate affect, the patient is oriented x 3 and speaking appropriately.   APPEARANCE: Patient appears comfortable and in no acute distress, patient is clean and well groomed.  SKIN: The skin is warm and dry, color consistent with ethnicity, patient has normal skin turgor and moist mucus membranes, skin intact, no breakdown or bruising noted.   MUSCULOSKELETAL: Patient moving all extremities spontaneously, no swelling noted.  RESPIRATORY: Airway is open and patent, respirations are spontaneous, patient has a normal effort and rate, no accessory muscle use noted, pt placed on continuous pulse ox with O2 sats noted at 97% on room air.  CARDIAC: Pt placed on cardiac monitor. Patient has a normal rate and regular rhythm, no edema noted, capillary refill < 3 seconds.   GASTRO: Soft and non tender to palpation, no distention noted, normoactive bowel sounds present in all four quadrants. Pt states bowel movements have been regular.  : Pt denies any pain or frequency with urination.  NEURO: Pt opens eyes spontaneously, behavior appropriate to situation, follows commands, facial expression symmetrical, bilateral hand grasp equal and even, purposeful motor response noted, normal sensation in all extremities when touched with a finger.    "

## 2022-06-27 NOTE — ED NOTES
Discharge instuctions reviewed with pt by MD and this RN, pt verbalizes understanding.  Pt is A&Ox4, respirations clear and unlabored, pt in no apparent distress, able to ambulate with smooth and steady gait.

## 2022-06-29 ENCOUNTER — OFFICE VISIT (OUTPATIENT)
Dept: PLASTIC SURGERY | Facility: CLINIC | Age: 57
End: 2022-06-29
Payer: MEDICARE

## 2022-06-29 VITALS
SYSTOLIC BLOOD PRESSURE: 112 MMHG | DIASTOLIC BLOOD PRESSURE: 65 MMHG | WEIGHT: 234 LBS | HEIGHT: 65 IN | BODY MASS INDEX: 38.99 KG/M2 | HEART RATE: 76 BPM

## 2022-06-29 DIAGNOSIS — Z09 SURGERY FOLLOW-UP EXAMINATION: Primary | ICD-10-CM

## 2022-06-29 PROCEDURE — 99999 PR PBB SHADOW E&M-EST. PATIENT-LVL III: CPT | Mod: PBBFAC,,, | Performed by: SURGERY

## 2022-06-29 PROCEDURE — 99999 PR PBB SHADOW E&M-EST. PATIENT-LVL III: ICD-10-PCS | Mod: PBBFAC,,, | Performed by: SURGERY

## 2022-06-29 PROCEDURE — 99024 POSTOP FOLLOW-UP VISIT: CPT | Mod: POP,,, | Performed by: SURGERY

## 2022-06-29 PROCEDURE — 99213 OFFICE O/P EST LOW 20 MIN: CPT | Mod: PBBFAC | Performed by: SURGERY

## 2022-06-29 PROCEDURE — 99024 PR POST-OP FOLLOW-UP VISIT: ICD-10-PCS | Mod: POP,,, | Performed by: SURGERY

## 2022-06-29 NOTE — PROGRESS NOTES
History & Physical  [facility]    SUBJECTIVE:     Chief complaint: 2 week f/u panniculectomy     History of Present Illness:  Patient is a 56 y.o. F 2 week follow up from Loulou Madden panniculectomy. She is doing well. She came to the ED a few days ago with complaint of drainage of fluid around her right drain. The patient was instructed to milk the drain well and this resulted in removal of clot and cessation of drainage around tube. She has both drains in place still and the outcomes have decreased to below 20mL per drain over 24 hours and serous in character. She denies any fever or chills.      Past Medical History:  Past Medical History:   Diagnosis Date    Allergy     Anemia     Asthma     Bilateral shoulder bursitis     Cervical stenosis of spine     Chronic pain     DDD (degenerative disc disease), cervical     Depression 1/28/2019    Diabetes mellitus type II     DJD (degenerative joint disease) of knee 6/19/2014    Facet arthritis of lumbar region 12/17/2015    Facet syndrome 12/17/2015    GERD (gastroesophageal reflux disease)     Heartburn     Hyperlipidemia     Hypertension     Morbid obesity     Neuromuscular disorder     PADDY (obstructive sleep apnea)     Proteinuria     Right carpal tunnel syndrome     Sacroiliitis 6/13/2018    Sacroiliitis     Sleep apnea        Past Surgical History:  Past Surgical History:   Procedure Laterality Date    CARPAL TUNNEL RELEASE      CARPAL TUNNEL RELEASE  1980s    left    CARPAL TUNNEL RELEASE  2012    right    COLONOSCOPY N/A 6/15/2020    Procedure: COLONOSCOPY;  Surgeon: Jc Koo MD;  Location: [facility] (4TH FLR);  Service: Endoscopy;  Laterality: N/A;  COVID screening on 6/13/20 at [facility]  pt updated on drop off location and no visitor policy-    ESOPHAGOGASTRODUODENOSCOPY N/A 3/27/2019    Procedure: EGD (ESOPHAGOGASTRODUODENOSCOPY);  Surgeon: Robbin Bar MD;  Location: [facility] (2ND FLR);  Service:  Endoscopy;  Laterality: N/A;  BMI 61.3/2nd floor case/svn    INJECTION OF JOINT Bilateral 6/9/2020    Procedure: INJECTION, JOINT, BILATERAL SI;  Surgeon: Lina Wagner MD;  Location: [facility] PAIN MGT;  Service: Pain Management;  Laterality: Bilateral;  B/L SI Joint Injection    INJECTION OF JOINT Bilateral 5/11/2021    Procedure: INJECTION, JOINT, SACROILIAC (SI) need consent;  Surgeon: Lina Wagner MD;  Location: [facility] PAIN [facility]GT;  Service: Pain Management;  Laterality: Bilateral;    JOINT REPLACEMENT Bilateral     with 2 revisions on rt    KNEE SURGERY  3/2010    orthroscope    KNEE SURGERY  6-19-14    left TKR    LAPAROSCOPIC SLEEVE GASTRECTOMY N/A 8/28/2019    Procedure: GASTRECTOMY, SLEEVE, LAPAROSCOPIC with intraop EGD;  Surgeon: Heriberto Clements MD;  [facility];  Service: General;  Laterality: N/A;    PANNICULECTOMY N/A 6/14/2022    Procedure: PANNICULECTOMY;  Surgeon: Rhett Gray MD;  [facility];  Service: Plastics;  Laterality: N/A;    RADIOFREQUENCY ABLATION Right 7/9/2019    Procedure: RADIOFREQUENCY ABLATION;  Surgeon: Lina Wagner MD;  Location: [facility] PAIN [facility]GT;  Service: Pain Management;  Laterality: Right;  RIGHT RFA L2,3,4,5  2 of 2    RADIOFREQUENCY ABLATION Left 12/19/2019    Procedure: RADIOFREQUENCY ABLATION, LEFT L2-L3-L4-L5 MEDIAL BRANCH 1 OF 2;  Surgeon: Lina Wagner MD;  Location: [facility] PAIN [facility]GT;  Service: Pain Management;  Laterality: Left;    RADIOFREQUENCY ABLATION Right 12/31/2019    Procedure: RADIOFREQUENCY ABLATION, RIGHT L2-L3-L4-L5  2 OF 2;  Surgeon: Lina Wagner MD;  Location: [facility] PAIN [facility]GT;  Service: Pain Management;  Laterality: Right;    RADIOFREQUENCY ABLATION Right 10/13/2020    Procedure: RADIOFREQUENCY ABLATION, Genicular Cooled 1 of 2;  Surgeon: Lina Wagner MD;  Location: [facility] PAIN [facility]GT;  Service: Pain Management;  Laterality: Right;    RADIOFREQUENCY ABLATION Left 11/3/2020    Procedure:  RADIOFREQUENCY ABLATION, GENICULAR COOLED  2 OF 2;  Surgeon: Lina Wagner MD;  Location: [facility] [place] [facility]GT;  Service: Pain Management;  Laterality: Left;    RADIOFREQUENCY ABLATION Left 12/15/2020    Procedure: RADIOFREQUENCY ABLATION LEFT L2,3,4,5 1 of 2;  Surgeon: Lina Wagner MD;  Location: [facility] PAIN [facility]GT;  Service: Pain Management;  Laterality: Left;    RADIOFREQUENCY ABLATION Right 12/29/2020    Procedure: RADIOFREQUENCY ABLATION RIGHT L2,3,4,5 2 of 2;  Surgeon: Lina Wagner MD;  Location: [facility] PAIN [facility]GT;  Service: Pain Management;  Laterality: Right;    RADIOFREQUENCY ABLATION Left 6/29/2021    Procedure: RADIOFREQUENCY ABLATION GENICULAR COOLED;  Surgeon: Lina Wagner MD;  Location: [facility] [place] [facility]IN [facility]GT;  Service: Pain Management;  Laterality: Left;  1 of 2    RADIOFREQUENCY ABLATION Right 7/13/2021    Procedure: RADIOFREQUENCY ABLATION GENICULAR;  Surgeon: Lina Wagner MD;  Location: [facility] [place] [facility]IN [facility]GT;  Service: Pain Management;  Laterality: Right;  2 of 2    RADIOFREQUENCY ABLATION Right 8/24/2021    Procedure: RADIOFREQUENCY ABLATION, L2-L3-L4-L5 MEDIAL BRANCH 1 OF 2;  Surgeon: Lina Wagner MD;  Location: [facility] [place] [facility]IN [facility]GT;  Service: Pain Management;  Laterality: Right;    RADIOFREQUENCY ABLATION Left 9/11/2021    Procedure: RADIOFREQUENCY ABLATION, L2-L3-L4-L5 MEDIAL BR ANCH 2 OF 2;  Surgeon: Lina Wagner MD;  Location: [facility] [place] [facility]IN [facility]GT;  Service: Pain Management;  Laterality: Left;    RADIOFREQUENCY ABLATION Right 3/7/2022    Procedure: RADIOFREQUENCY ABLATION RIGHT L3,4,5 1 of 2, needs consent;  Surgeon: Israel Hurtado MD;  Location: [facility] [place] [facility]IN [facility]GT;  Service: Pain Management;  Laterality: Right;    RADIOFREQUENCY ABLATION Left 3/21/2022    Procedure: RADIOFREQUENCY ABLATION RIGHT L3,4,5 2 of 2, needs consent;  Surgeon: Israel Hurtado MD;  Location: [facility] [place] [facility];  Service: Pain Management;  Laterality: Left;    RADIOFREQUENCY ABLATION Right 5/9/2022    Procedure: RADIOFREQUENCY  ABLATION RIGHT GENICULAR COOLED 1 of 2;  Surgeon: Israel Hurtado MD;  Location: [facility] [place] [facility]IN [facility]GT;  Service: Pain Management;  Laterality: Right;    RADIOFREQUENCY ABLATION Left 5/23/2022    Procedure: RADIOFREQUENCY ABLATION LEFT GENICULAR COOLED  2 of 2;  Surgeon: Israel Hurtado MD;  Location: [facility] [place] [facility]IN [facility]GT;  Service: Pain Management;  Laterality: Left;    RADIOFREQUENCY ABLATION OF LUMBAR MEDIAL BRANCH NERVE AT SINGLE LEVEL Left 6/26/2018    Procedure: RADIOFREQUENCY ABLATION, NERVE, MEDIAL BRANCH, LUMBAR, 1 LEVEL;  Surgeon: Lina Wagner MD;  Location: [facility] [place] [facility]IN [facility]GT;  Service: Pain Management;  Laterality: Left;  Left Cooled RFA @ L2,3,4,5  [id number]  with Sedation    1 of 2    RADIOFREQUENCY ABLATION OF LUMBAR MEDIAL BRANCH NERVE AT SINGLE LEVEL Right 7/10/2018    Procedure: RADIOFREQUENCY ABLATION, NERVE, MEDIAL BRANCH, LUMBAR, 1 LEVEL;  Surgeon: Lina Wagner MD;  Location: [facility] [place] [facility];  Service: Pain Management;  Laterality: Right;  RIght Cooled RFA @ L2,3,4,5  [id number] with Sedation    2 of 2    TRIGGER FINGER RELEASE Right 2017    TRIGGER FINGER RELEASE Left 7/29/2019    Procedure: RELEASE, TRIGGER FINGER, Left Thumb;  Surgeon: Velma Garcia MD;  Location: [facility] [place] [facility];  Service: Orthopedics;  Laterality: Left;  Local w/ MAC       Family History:  Family History   Problem Relation Age of Onset    Diabetes Mother     Cataracts Mother     Diabetes Father     Cataracts Father     Hypertension Sister     Diabetes Sister     No Known Problems Sister     Cancer Sister         lymphoma     Coronary artery disease Brother     Diabetes Brother     Diabetes Brother     Hypotension Brother     Kidney failure Brother     Hypotension Brother     Amblyopia Neg Hx     Blindness Neg Hx     Glaucoma Neg Hx     Macular degeneration Neg Hx     Retinal detachment Neg Hx     Strabismus Neg Hx     Stroke Neg Hx     Thyroid disease Neg Hx     Anesthesia problems Neg Hx        Social  History:  Social History     Socioeconomic History    Marital status:    Tobacco Use    Smoking status: Never Smoker    Smokeless tobacco: Never Used   Substance and Sexual Activity    Alcohol use: Yes     Comment: occasionally     Drug use: No    Sexual activity: Yes     Partners: Female     Birth control/protection: None   Social History Narrative    Disabled. The patient is the youngest of 6 siblings. Single. Lives with single-sex partner.                    Medications:  Current Outpatient Medications   Medication Sig Dispense Refill    albuterol (VENTOLIN HFA) 90 mcg/actuation inhaler INHALE TWO PUFFS INTO LUNGS EVERY 4 TO 6 HOURS AS NEEDED FOR SHORTNESS OF BREATH AND FOR WHEEZING 18 g 1    allopurinoL (ZYLOPRIM) 300 MG tablet Take 1 tablet (300 mg total) by mouth 2 (two) times daily. 180 tablet 3    atorvastatin (LIPITOR) 20 MG tablet Take 1 tablet (20 mg total) by mouth once daily. 90 tablet 3    b complex vitamins tablet Take 1 tablet by mouth every other day.       calcium citrate/vitamin D2 (ISIAH-CITRATE ORAL) Take 500 mg by mouth 2 (two) times daily.      colchicine (MITIGARE) 0.6 mg Cap Take 1 capsule (0.6 mg total) by mouth daily as needed (flare up). 60 capsule 3    cyanocobalamin (VITAMIN B-12) 1000 MCG tablet Take 100 mcg by mouth every morning.      diclofenac sodium (VOLTAREN) 1 % Gel Apply 2 g topically 4 (four) times daily as needed. To painful area on the feet. 500 g 5    FLUoxetine (PROZAC) 20 mg/5 mL (4 mg/mL) solution Take 5 mLs (20 mg total) by mouth once daily. 450 mL 3    fluticasone propionate (FLONASE) 50 mcg/actuation nasal spray 1 spray (50 mcg total) by Each Nostril route 2 (two) times daily as needed for Rhinitis. 15 g 6    indomethacin (INDOCIN) 50 MG capsule TAKE 1 CAPSULE BY MOUTH 2 TIMES DAILY WITH MEALS 30 capsule 2    LIDOcaine (XYLOCAINE) 5 % Oint ointment Apply topically as needed.      MULTIVIT-IRON-MIN-FOLIC ACID 3,500-18-0.4 UNIT-MG-MG ORAL CHEW  "Take 1 tablet by mouth 2 (two) times daily.       nystatin (MYCOSTATIN) powder Apply topically 2 (two) times daily. 60 g 3    omeprazole (PRILOSEC) 20 MG capsule Take 1 capsule (20 mg total) by mouth every morning. 90 capsule 3    oxybutynin (DITROPAN-XL) 5 MG TR24 Take 1 tablet (5 mg total) by mouth once daily. 90 tablet 3    oxyCODONE-acetaminophen (PERCOCET)  mg per tablet Take 1 tablet by mouth every 8 (eight) hours as needed for Pain. 90 tablet 0    vitamin D 185 MG Tab Take 5,000 mg by mouth every morning.        No current facility-administered medications for this visit.     Facility-Administered Medications Ordered in Other Visits   Medication Dose Route Frequency Provider Last Rate Last Admin    0.9%  NaCl infusion   Intravenous Continuous Lina Wagner MD 25 mL/hr at 12/15/20 1506 25 mL/hr at 12/15/20 1506       Allergies:  Review of patient's allergies indicates:   Allergen Reactions    Strawberry Anaphylaxis       Review of Systems:  Negative except for HPI.      OBJECTIVE:     /65 (BP Location: Left arm, Patient Position: Sitting)   Pulse 76   Ht 5' 5" (1.651 m)   Wt 106.1 kg (234 lb)   LMP 06/26/2018   BMI 38.94 kg/m²     Physical Exam:  Constitutional: Oriented to person, place, and time. Appears well-developed and well-nourished.   Abd: glue and tape on surgical sites with staples at lateral part of lower transverse incision. Small (4cm) superficial breakdown at left lower transverse incision no signs of local infection.         ASSESSMENT/PLAN:     56 y.o. F 2 weeks post op from Loulou Madden panniculectomy doing well  - Remove drain on right   - Return to clinic 2 weeks  - Bacitracin to breakdown       "

## 2022-07-05 ENCOUNTER — PATIENT MESSAGE (OUTPATIENT)
Dept: BARIATRICS | Facility: CLINIC | Age: 57
End: 2022-07-05
Payer: MEDICARE

## 2022-07-06 ENCOUNTER — TELEPHONE (OUTPATIENT)
Dept: PAIN MEDICINE | Facility: CLINIC | Age: 57
End: 2022-07-06
Payer: MEDICARE

## 2022-07-06 NOTE — TELEPHONE ENCOUNTER
Staff spoke with pt in regards to refill request that it has been received and will be submitted to  once a response is received pt will be updated.

## 2022-07-06 NOTE — TELEPHONE ENCOUNTER
----- Message from Dianne Draper sent at 7/6/2022  2:45 PM CDT -----  Who Called:        Refill or New Rx: refill         RX Name and Strength:   oxyCODONE-acetaminophen (PERCOCET)  mg per tablet        How is the patient currently taking it? (ex. 1XDay)        Is this a 30 day or 90 day RX        Preferred Pharmacy with phone number:   [facility] - [address]        Local or Mail Order: local         Ordering Provider:        Would the patient rather a call back or a response via [facility]? Missed call         Best Call Back Number:  [phone]        Additional Information:

## 2022-07-07 DIAGNOSIS — G89.4 CHRONIC PAIN SYNDROME: ICD-10-CM

## 2022-07-07 DIAGNOSIS — M47.816 LUMBAR SPONDYLOSIS: Primary | ICD-10-CM

## 2022-07-07 NOTE — TELEPHONE ENCOUNTER
Patient requesting refill on Percocet  mg  Last office visit 4/19/22   shows last refill on 6/10/22  Patient [facility] have [facility]  Patient last UDS 6/7/21 was consistent with current therapy    CODEINE  Not Detected   Not Detected   Not Detected  Not Detected     MORPHINE  Not Detected   Not Detected   Not Detected  Not Detected     6-ACETYLMORPHINE  Not Detected   Not Detected   Not Detected  Not Detected     OXYCODONE  Present   Not Detected   Not Detected  Present     NOROYXCODONE  Present   Present   Present  Present     OXYMORPHONE  Present   Not Detected   Not Detected  Not Detected     NOROXYMORPHONE  Not Detected   Not Detected   Not Detected  Not Detected     HYDROCODONE  Not Detected   Not Detected   Not Detected  Not Detected     NORHYDROCODONE  Not Detected   Not Detected   Not Detected  Not Detected     HYDROMORPHONE  Not Detected   Not Detected   Not Detected  Not Detected     BUPRENORPHINE  Not Detected   Not Detected   Not Detected  Not Detected     NORUBPRENORPHINE  Not Detected   Not Detected   Not Detected  Not Detected     FENTANYL  Not Detected   Not Detected   Not Detected  Not Detected     NORFENTANYL  Not Detected   Not Detected   Not Detected  Not Detected     MEPERIDINE METABOLITE  Not Detected   Not Detected   Not Detected  Not Detected     TAPENTADOL  Not Detected   Not Detected   Not Detected  Not Detected     TAPENTADOL-O-SULF  Not Detected   Not Detected   Not Detected  Not Detected     METHADONE  Not Detected   Not Detected   Not Detected  Not Detected     TRAMADOL  Not Detected   Not Detected   Not Detected  Not Detected     AMPHETAMINE  Not Detected   Not Detected   Not Detected  Not Detected     METHAMPHETAMINE  Not Detected   Not Detected   Not Detected  Not Detected     MDMA- ECSTASY  Not Detected   Not Detected   Not Detected  Not Detected     MDA  Not Detected   Not Detected   Not Detected  Not Detected      MDEA- Kait  Not Detected   Not Detected   Not Detected  Not Detected     METHYLPHENIDATE  Not Detected   Not Detected   Not Detected  Not Detected     PHENTERMINE  Not Detected   Not Detected   Not Detected  Not Detected     BENZOYLECGONINE  Not Detected   Not Detected   Not Detected  Not Detected     ALPRAZOLAM  Not Detected   Not Detected   Not Detected  Not Detected     ALPHA-OH-ALPRAZOLAM  Not Detected   Not Detected   Not Detected  Not Detected     CLONAZEPAM  Not Detected   Not Detected   Not Detected  Not Detected     7-AMINOCLONAZEPAM  Not Detected   Not Detected   Not Detected  Not Detected     DIAZEPAM  Not Detected   Not Detected   Not Detected  Not Detected     NORDIAZEPAM  Not Detected   Not Detected   Not Detected  Not Detected     OXAZEPAM  Not Detected   Not Detected   Not Detected  Not Detected     TEMAZEPAM  Not Detected   Not Detected   Not Detected  Not Detected     Lorazepam  Not Detected   Not Detected   Not Detected  Not Detected     MIDAZOLAM  Not Detected   Not Detected   Not Detected  Not Detected     ZOLPIDEM  Not Detected   Not Detected   Not Detected  Not Detected     BARBITURATES  Not Detected   Not Detected   Not Detected  Not Detected     Creatinine, Urine 20.0 - 400.0 mg/dL 93.0  86.0 R, CM  66.1  113.0 R, CM  91.8  163.4  301.0 R, CM    ETHYL GLUCURONIDE  Not Detected   Not Detected   Not Detected  Not Detected     MARIJUANA METABOLITE  Not Detected   Not Detected   Not Detected  Not Detected     PCP  Not Detected   Not Detected   Not Detected  Not Detected     CARISOPRODOL  Not Detected   Not Detected CM   Not Detected CM  Not Detected CM     Comment: The carisoprodol immunoassay has cross-reactivity to   carisoprodol and meprobamate.    Naloxone  Not Detected          Gabapentin  Not Detected          Pregabalin  Not Detected          Alpha-OH-Midazolam  Not Detected          Zolpidem Metabolite  Not Detected

## 2022-07-08 RX ORDER — OXYCODONE AND ACETAMINOPHEN 10; 325 MG/1; MG/1
1 TABLET ORAL EVERY 8 HOURS PRN
Qty: 90 TABLET | Refills: 0 | Status: SHIPPED | OUTPATIENT
Start: 2022-07-08 | End: 2022-08-09 | Stop reason: SDUPTHER

## 2022-07-13 ENCOUNTER — OFFICE VISIT (OUTPATIENT)
Dept: PLASTIC SURGERY | Facility: CLINIC | Age: 57
End: 2022-07-13
Payer: MEDICARE

## 2022-07-13 VITALS
BODY MASS INDEX: 38.99 KG/M2 | HEART RATE: 63 BPM | HEIGHT: 65 IN | DIASTOLIC BLOOD PRESSURE: 70 MMHG | SYSTOLIC BLOOD PRESSURE: 133 MMHG | WEIGHT: 234 LBS

## 2022-07-13 DIAGNOSIS — Z09 SURGERY FOLLOW-UP EXAMINATION: Primary | ICD-10-CM

## 2022-07-13 PROCEDURE — 99213 OFFICE O/P EST LOW 20 MIN: CPT | Mod: PBBFAC | Performed by: SURGERY

## 2022-07-13 PROCEDURE — 99999 PR PBB SHADOW E&M-EST. PATIENT-LVL III: CPT | Mod: PBBFAC,,, | Performed by: SURGERY

## 2022-07-13 PROCEDURE — 99999 PR PBB SHADOW E&M-EST. PATIENT-LVL III: ICD-10-PCS | Mod: PBBFAC,,, | Performed by: SURGERY

## 2022-07-13 PROCEDURE — 99024 PR POST-OP FOLLOW-UP VISIT: ICD-10-PCS | Mod: POP,,, | Performed by: SURGERY

## 2022-07-13 PROCEDURE — 99024 POSTOP FOLLOW-UP VISIT: CPT | Mod: POP,,, | Performed by: SURGERY

## 2022-07-13 NOTE — PROGRESS NOTES
Plastic Surgery Clinic Postop Visit    Subjective:      Alivia Thomas is a 56 y.o. year old female who presents to the [facility] on 07/13/2022 for follow up visit status post panniculectomy on 6/14/22 recovering well . Denies fever, chills, nausea, vomiting, or other systemic signs of infection.    Vitals:    07/13/22 1016   BP: 133/70   Pulse: 63        Review of patient's allergies indicates:   Allergen Reactions    Strawberry Anaphylaxis       Current Outpatient Medications on File Prior to Visit   Medication Sig Dispense Refill    albuterol (VENTOLIN HFA) 90 mcg/actuation inhaler INHALE TWO PUFFS INTO LUNGS EVERY 4 TO 6 HOURS AS NEEDED FOR SHORTNESS OF BREATH AND FOR WHEEZING 18 g 1    allopurinoL (ZYLOPRIM) 300 MG tablet Take 1 tablet (300 mg total) by mouth 2 (two) times daily. 180 tablet 3    atorvastatin (LIPITOR) 20 MG tablet Take 1 tablet (20 mg total) by mouth once daily. 90 tablet 3    b complex vitamins tablet Take 1 tablet by mouth every other day.       calcium citrate/vitamin D2 (ISIAH-CITRATE ORAL) Take 500 mg by mouth 2 (two) times daily.      colchicine (MITIGARE) 0.6 mg Cap Take 1 capsule (0.6 mg total) by mouth daily as needed (flare up). 60 capsule 3    cyanocobalamin (VITAMIN B-12) 1000 MCG tablet Take 100 mcg by mouth every morning.      diclofenac sodium (VOLTAREN) 1 % Gel Apply 2 g topically 4 (four) times daily as needed. To painful area on the feet. 500 g 5    FLUoxetine (PROZAC) 20 mg/5 mL (4 mg/mL) solution Take 5 mLs (20 mg total) by mouth once daily. 450 mL 3    fluticasone propionate (FLONASE) 50 mcg/actuation nasal spray 1 spray (50 mcg total) by Each Nostril route 2 (two) times daily as needed for Rhinitis. 15 g 6    indomethacin (INDOCIN) 50 MG capsule TAKE 1 CAPSULE BY MOUTH 2 TIMES DAILY WITH MEALS 30 capsule 2    LIDOcaine (XYLOCAINE) 5 % Oint ointment APPLY TOPICALLY AS NEEDED. 35.44 g 6    MULTIVIT-IRON-MIN-FOLIC ACID 3,500-18-0.4  UNIT-MG-MG ORAL CHEW Take 1 tablet by mouth 2 (two) times daily.       nystatin (MYCOSTATIN) powder Apply topically 2 (two) times daily. 60 g 3    omeprazole (PRILOSEC) 20 MG capsule Take 1 capsule (20 mg total) by mouth every morning. 90 capsule 3    oxybutynin (DITROPAN-XL) 5 MG TR24 Take 1 tablet (5 mg total) by mouth once daily. 90 tablet 3    oxyCODONE-acetaminophen (PERCOCET)  mg per tablet Take 1 tablet by mouth every 8 (eight) hours as needed for Pain. 90 tablet 0    vitamin D 185 MG Tab Take 5,000 mg by mouth every morning.        Current Facility-Administered Medications on File Prior to Visit   Medication Dose Route Frequency Provider Last Rate Last Admin    0.9%  NaCl infusion   Intravenous Continuous Lina Wagner MD 25 mL/hr at 12/15/20 1506 25 mL/hr at 12/15/20 1506       Patient Active Problem List   Diagnosis    Morbid obesity    PADDY (obstructive sleep apnea)    Type 2 diabetes mellitus without complication, without long-term current use of insulin    Nuclear sclerosis - Both Eyes    Hyperlipemia    Proteinuria    Type 2 diabetes mellitus without retinopathy - Both Eyes    Bilateral knee pain    DJD (degenerative joint disease) of knee    Debility    Prosthetic joint implant failure    Failed total knee arthroplasty    Right knee pain    Knee pain    History of total left knee replacement    Lumbago    CTS (carpal tunnel syndrome)    Right carpal tunnel syndrome    Chronic, continuous use of opioids    Mild intermittent asthma without complication    Left arm pain    Left shoulder pain    Allergic reaction to food    DDD (degenerative disc disease), cervical    Cervical radiculopathy    Cervical spondylosis    Cubital tunnel syndrome on right    Bilateral shoulder bursitis    Cervical stenosis of spinal canal    DDD (degenerative disc disease), thoracic    Thoracic spondylosis    Spondylolisthesis of lumbar region    Lumbar spondylosis     Spondylolisthesis of cervical region    Cervical spine instability    Myeloradiculopathy    Neural foraminal stenosis of cervical spine    DDD (degenerative disc disease), lumbar    Idiopathic scoliosis of thoracolumbar spine    Bilateral shoulder region arthritis    Impingement syndrome of right shoulder    Trochanteric bursitis of both hips    Chronic pain    Depression    Recurrent major depressive disorder, in partial remission    GERD (gastroesophageal reflux disease)    Trigger finger    Dyspnea    BMI 45.0-49.9, adult    Sacroiliac joint pain    Spondylosis of lumbosacral region without myelopathy or radiculopathy    Subacromial bursitis of left shoulder joint    Sacroiliitis    Snoring    BMI 39.0-39.9,adult    S/P panniculectomy    Gout    History of sleeve gastrectomy    History of total right knee replacement    Noncompliance with CPAP treatment       Past Surgical History:   Procedure Laterality Date    CARPAL TUNNEL RELEASE      CARPAL TUNNEL RELEASE  1980s    left    CARPAL TUNNEL RELEASE  2012    right    COLONOSCOPY N/A 6/15/2020    Procedure: COLONOSCOPY;  Surgeon: Jc Koo MD;  Location: [facility] (4TH FLR);  Service: Endoscopy;  Laterality: N/A;  COVID screening on 6/13/20 at [facility]  pt updated on drop off location and no visitor [facility]-    ESOPHAGOGASTRODUODENOSCOPY N/A 3/27/2019    Procedure: EGD (ESOPHAGOGASTRODUODENOSCOPY);  Surgeon: Robbin Bar MD;  Location: [facility] (2ND FLR);  Service: Endoscopy;  Laterality: N/A;  BMI 61.3/2nd floor case/svn    INJECTION OF JOINT Bilateral 6/9/2020    Procedure: INJECTION, JOINT, BILATERAL SI;  Surgeon: Lina Wagner MD;  [facility];  Service: Pain Management;  Laterality: Bilateral;  B/L SI Joint Injection    INJECTION OF JOINT Bilateral 5/11/2021    Procedure: INJECTION, JOINT, SACROILIAC (SI) need consent;  Surgeon: Lina Wagner MD;  Location: [facility];  Service: Pain  Management;  Laterality: Bilateral;    JOINT REPLACEMENT Bilateral     with 2 revisions on rt    KNEE SURGERY  3/2010    orthroscope    KNEE SURGERY  6-19-14    left TKR    LAPAROSCOPIC SLEEVE GASTRECTOMY N/A 8/28/2019    Procedure: GASTRECTOMY, SLEEVE, LAPAROSCOPIC with intraop EGD;  Surgeon: Heriberto Clements MD;  Location: [facility] OR 2ND FLR;  Service: General;  Laterality: N/A;    PANNICULECTOMY N/A 6/14/2022    Procedure: PANNICULECTOMY;  Surgeon: Rhett Gray MD;  Location: [facility] OR 2ND FLR;  Service: Plastics;  Laterality: N/A;    RADIOFREQUENCY ABLATION Right 7/9/2019    Procedure: RADIOFREQUENCY ABLATION;  Surgeon: Lina Wagner MD;  Location: [facility]IN [facility]GT;  Service: Pain Management;  Laterality: Right;  RIGHT RFA L2,3,4,5  2 of 2    RADIOFREQUENCY ABLATION Left 12/19/2019    Procedure: RADIOFREQUENCY ABLATION, LEFT L2-L3-L4-L5 MEDIAL BRANCH 1 OF 2;  Surgeon: Lina Wagner MD;  Location: [facility];  Service: Pain Management;  Laterality: Left;    RADIOFREQUENCY ABLATION Right 12/31/2019    Procedure: RADIOFREQUENCY ABLATION, RIGHT L2-L3-L4-L5  2 OF 2;  Surgeon: Lina Wagner MD;  Location: [facility];  Service: Pain Management;  Laterality: Right;    RADIOFREQUENCY ABLATION Right 10/13/2020    Procedure: RADIOFREQUENCY ABLATION, Genicular Cooled 1 of 2;  Surgeon: Lina Wagner MD;  Location: [facility];  Service: Pain Management;  Laterality: Right;    RADIOFREQUENCY ABLATION Left 11/3/2020    Procedure: RADIOFREQUENCY ABLATION, GENICULAR COOLED  2 OF 2;  Surgeon: Lina Wagner MD;  Location: [facility];  Service: Pain Management;  Laterality: Left;    RADIOFREQUENCY ABLATION Left 12/15/2020    Procedure: RADIOFREQUENCY ABLATION LEFT L2,3,4,5 1 of 2;  Surgeon: Lina Wagner MD;  Location: [facility];  Service: Pain Management;  Laterality: Left;    RADIOFREQUENCY ABLATION Right 12/29/2020    Procedure: RADIOFREQUENCY ABLATION RIGHT  L2,3,4,5 2 of 2;  Surgeon: Lina Wagner MD;  Location: [facility]GT;  Service: Pain Management;  Laterality: Right;    RADIOFREQUENCY ABLATION Left 6/29/2021    Procedure: RADIOFREQUENCY ABLATION GENICULAR COOLED;  Surgeon: Lina Wagner MD;  Location: [facility]GT;  Service: Pain Management;  Laterality: Left;  1 of 2    RADIOFREQUENCY ABLATION Right 7/13/2021    Procedure: RADIOFREQUENCY ABLATION GENICULAR;  Surgeon: Lina Wagner MD;  Location: [facility];  Service: Pain Management;  Laterality: Right;  2 of 2    RADIOFREQUENCY ABLATION Right 8/24/2021    Procedure: RADIOFREQUENCY ABLATION, L2-L3-L4-L5 MEDIAL BRANCH 1 OF 2;  Surgeon: Lina Wagner MD;  Location: [facility]GT;  Service: Pain Management;  Laterality: Right;    RADIOFREQUENCY ABLATION Left 9/11/2021    Procedure: RADIOFREQUENCY ABLATION, L2-L3-L4-L5 MEDIAL BR ANCH 2 OF 2;  Surgeon: Lina Wagner MD;  Location: [facility]GT;  Service: Pain Management;  Laterality: Left;    RADIOFREQUENCY ABLATION Right 3/7/2022    Procedure: RADIOFREQUENCY ABLATION RIGHT L3,4,5 1 of 2, needs consent;  Surgeon: Israel Hurtado MD;  Location: [facility];  Service: Pain Management;  Laterality: Right;    RADIOFREQUENCY ABLATION Left 3/21/2022    Procedure: RADIOFREQUENCY ABLATION RIGHT L3,4,5 2 of 2, needs consent;  Surgeon: Israel Hurtado MD;  Location: [facility];  Service: Pain Management;  Laterality: Left;    RADIOFREQUENCY ABLATION Right 5/9/2022    Procedure: RADIOFREQUENCY ABLATION RIGHT GENICULAR COOLED 1 of 2;  Surgeon: Israel Hurtado MD;  Location: [facility]GT;  Service: Pain Management;  Laterality: Right;    RADIOFREQUENCY ABLATION Left 5/23/2022    Procedure: RADIOFREQUENCY ABLATION LEFT GENICULAR COOLED  2 of 2;  Surgeon: Israel Hurtado MD;  Location: [facility];  Service: Pain Management;  Laterality: Left;    RADIOFREQUENCY ABLATION OF LUMBAR MEDIAL BRANCH NERVE AT SINGLE LEVEL Left 6/26/2018    Procedure:  RADIOFREQUENCY ABLATION, NERVE, MEDIAL BRANCH, LUMBAR, 1 LEVEL;  Surgeon: Lina Wagner MD;  Location: [facility];  Service: Pain Management;  Laterality: Left;  Left Cooled RFA @ L2,3,4,5  [id number]  with Sedation    1 of 2    RADIOFREQUENCY ABLATION OF LUMBAR MEDIAL BRANCH NERVE AT SINGLE LEVEL Right 7/10/2018    Procedure: RADIOFREQUENCY ABLATION, NERVE, MEDIAL BRANCH, LUMBAR, 1 LEVEL;  Surgeon: Lina Wagner MD;  Location: [facility];  Service: Pain Management;  Laterality: Right;  RIght Cooled RFA @ L2,3,4,5  [id number] with Sedation    2 of 2    TRIGGER FINGER RELEASE Right 2017    TRIGGER FINGER RELEASE Left 7/29/2019    Procedure: RELEASE, TRIGGER FINGER, Left Thumb;  Surgeon: Velma Garcia MD;  Location: [facility] OR;  Service: Orthopedics;  Laterality: Left;  Local w/ MAC       Social History     Socioeconomic History    Marital status:    Tobacco Use    Smoking status: Never Smoker    Smokeless tobacco: Never Used   Substance and Sexual Activity    Alcohol use: Yes     Comment: occasionally     Drug use: No    Sexual activity: Yes     Partners: Female     Birth control/protection: None   Social History Narrative    Disabled. The patient is the youngest of 6 siblings. Single. Lives with single-sex partner.                        Review of Systems: negative except for pinching from the abdominal wall staples    Objective:     Physical Exam:  Vitals:    07/13/22 1016   BP: 133/70   Pulse: 63       WD WN NAD  VSS  Normal resp effort  Abdominal incision healing well with small opening at the midline T portion of the incision, granulation tissue healing in, staples on lateral aspect removed and VANI with no output removed in office today, no sign of infection or erythema         Assessment:       No diagnosis found.    Plan:   56 y.o. female status post panniculectomy recovering well  - Doing well, no issues  - staples removed and VANI drain removed  Peroxide to midline  incision and follow up in 3 weeks for re-evaluation   - Return to clinic in 3 weeks.       All questions were answered. The patient was advised to call the clinic with any questions or concerns prior to their next visit.       Elton Covington MD- Fellow

## 2022-07-21 DIAGNOSIS — E11.9 TYPE 2 DIABETES MELLITUS WITHOUT COMPLICATION: ICD-10-CM

## 2022-08-01 ENCOUNTER — OFFICE VISIT (OUTPATIENT)
Dept: PLASTIC SURGERY | Facility: CLINIC | Age: 57
End: 2022-08-01
Payer: MEDICARE

## 2022-08-01 ENCOUNTER — OFFICE VISIT (OUTPATIENT)
Dept: OBSTETRICS AND GYNECOLOGY | Facility: CLINIC | Age: 57
End: 2022-08-01
Payer: MEDICARE

## 2022-08-01 VITALS
WEIGHT: 234 LBS | HEIGHT: 65 IN | BODY MASS INDEX: 38.99 KG/M2 | DIASTOLIC BLOOD PRESSURE: 81 MMHG | SYSTOLIC BLOOD PRESSURE: 138 MMHG | HEART RATE: 71 BPM

## 2022-08-01 VITALS
DIASTOLIC BLOOD PRESSURE: 73 MMHG | WEIGHT: 243.19 LBS | SYSTOLIC BLOOD PRESSURE: 116 MMHG | BODY MASS INDEX: 40.47 KG/M2

## 2022-08-01 DIAGNOSIS — F33.41 RECURRENT MAJOR DEPRESSIVE DISORDER, IN PARTIAL REMISSION: ICD-10-CM

## 2022-08-01 DIAGNOSIS — K21.9 GASTROESOPHAGEAL REFLUX DISEASE, UNSPECIFIED WHETHER ESOPHAGITIS PRESENT: Chronic | ICD-10-CM

## 2022-08-01 DIAGNOSIS — E11.9 TYPE 2 DIABETES MELLITUS WITHOUT COMPLICATION, WITHOUT LONG-TERM CURRENT USE OF INSULIN: Chronic | ICD-10-CM

## 2022-08-01 DIAGNOSIS — E78.2 MIXED HYPERLIPIDEMIA: Chronic | ICD-10-CM

## 2022-08-01 DIAGNOSIS — Z11.3 SCREEN FOR STD (SEXUALLY TRANSMITTED DISEASE): ICD-10-CM

## 2022-08-01 DIAGNOSIS — J45.20 MILD INTERMITTENT ASTHMA WITHOUT COMPLICATION: Chronic | ICD-10-CM

## 2022-08-01 DIAGNOSIS — Z90.3 HISTORY OF SLEEVE GASTRECTOMY: Chronic | ICD-10-CM

## 2022-08-01 DIAGNOSIS — Z09 SURGERY FOLLOW-UP EXAMINATION: Primary | ICD-10-CM

## 2022-08-01 DIAGNOSIS — Z01.419 ENCOUNTER FOR GYNECOLOGICAL EXAMINATION WITHOUT ABNORMAL FINDING: Primary | ICD-10-CM

## 2022-08-01 DIAGNOSIS — Z12.31 VISIT FOR SCREENING MAMMOGRAM: ICD-10-CM

## 2022-08-01 PROCEDURE — 99999 PR PBB SHADOW E&M-EST. PATIENT-LVL III: ICD-10-PCS | Mod: PBBFAC,,, | Performed by: SURGERY

## 2022-08-01 PROCEDURE — 99999 PR PBB SHADOW E&M-EST. PATIENT-LVL III: ICD-10-PCS | Mod: PBBFAC,,, | Performed by: OBSTETRICS & GYNECOLOGY

## 2022-08-01 PROCEDURE — G0101 PR CA SCREEN;PELVIC/BREAST EXAM: ICD-10-PCS | Mod: S$PBB,,, | Performed by: OBSTETRICS & GYNECOLOGY

## 2022-08-01 PROCEDURE — 99213 OFFICE O/P EST LOW 20 MIN: CPT | Mod: PBBFAC | Performed by: SURGERY

## 2022-08-01 PROCEDURE — 99999 PR PBB SHADOW E&M-EST. PATIENT-LVL III: CPT | Mod: PBBFAC,,, | Performed by: OBSTETRICS & GYNECOLOGY

## 2022-08-01 PROCEDURE — G0101 CA SCREEN;PELVIC/BREAST EXAM: HCPCS | Mod: S$PBB,,, | Performed by: OBSTETRICS & GYNECOLOGY

## 2022-08-01 PROCEDURE — 99999 PR PBB SHADOW E&M-EST. PATIENT-LVL III: CPT | Mod: PBBFAC,,, | Performed by: SURGERY

## 2022-08-01 PROCEDURE — 99024 PR POST-OP FOLLOW-UP VISIT: ICD-10-PCS | Mod: POP,,, | Performed by: SURGERY

## 2022-08-01 PROCEDURE — 99024 POSTOP FOLLOW-UP VISIT: CPT | Mod: POP,,, | Performed by: SURGERY

## 2022-08-01 PROCEDURE — 99213 OFFICE O/P EST LOW 20 MIN: CPT | Mod: PBBFAC,27,PN | Performed by: OBSTETRICS & GYNECOLOGY

## 2022-08-01 NOTE — PROGRESS NOTES
HISTORY OF PRESENT ILLNESS:    Alivia Thomas is a 56 y.o. female, , Patient's last menstrual period was 2018.,  presents for a routine exam and has no complaints.    Past Medical History:   Diagnosis Date    Allergy     Anemia     Asthma     Bilateral shoulder bursitis     Cervical stenosis of spine     Chronic pain     DDD (degenerative disc disease), cervical     Depression 2019    Diabetes mellitus type II     DJD (degenerative joint disease) of knee 2014    Facet arthritis of lumbar region 2015    Facet syndrome 2015    GERD (gastroesophageal reflux disease)     Heartburn     Hyperlipidemia     Hypertension     Morbid obesity     Neuromuscular disorder     PADDY (obstructive sleep apnea)     Proteinuria     Right carpal tunnel syndrome     Sacroiliitis 2018    Sacroiliitis     Sleep apnea        Past Surgical History:   Procedure Laterality Date    CARPAL TUNNEL RELEASE      CARPAL TUNNEL RELEASE  1980s    left    CARPAL TUNNEL RELEASE  2012    right    COLONOSCOPY N/A 6/15/2020    Procedure: COLONOSCOPY;  Surgeon: Jc Koo MD;  Location: [facility] (4TH FLR);  Service: Endoscopy;  Laterality: N/A;  COVID screening on 20 at [facility]  pt updated on drop off location and no visitor policy-    ESOPHAGOGASTRODUODENOSCOPY N/A 3/27/2019    Procedure: EGD (ESOPHAGOGASTRODUODENOSCOPY);  Surgeon: Robbin Bar MD;  Location: [facility] (2ND FLR);  Service: Endoscopy;  Laterality: N/A;  BMI 61.3/2nd floor case/svn    INJECTION OF JOINT Bilateral 2020    Procedure: INJECTION, JOINT, BILATERAL SI;  Surgeon: Lina Wagner MD;  [facility];  Service: Pain Management;  Laterality: Bilateral;  B/L SI Joint Injection    INJECTION OF JOINT Bilateral 2021    Procedure: INJECTION, JOINT, SACROILIAC (SI) need consent;  Surgeon: Lina Wagner MD;  Location: [facility];  Service: Pain Management;   Laterality: Bilateral;    JOINT REPLACEMENT Bilateral     with 2 revisions on rt    KNEE SURGERY  3/2010    orthroscope    KNEE SURGERY  6-19-14    left TKR    LAPAROSCOPIC SLEEVE GASTRECTOMY N/A 8/28/2019    Procedure: GASTRECTOMY, SLEEVE, LAPAROSCOPIC with intraop EGD;  Surgeon: Heriberto Clements MD;  Location: [facility] OR 2ND FLR;  Service: General;  Laterality: N/A;    PANNICULECTOMY N/A 6/14/2022    Procedure: PANNICULECTOMY;  Surgeon: Rhett Gray MD;  Location: [facility] OR 2ND FLR;  Service: Plastics;  Laterality: N/A;    RADIOFREQUENCY ABLATION Right 7/9/2019    Procedure: RADIOFREQUENCY ABLATION;  Surgeon: Lina Wagner MD;  Location: [facility];  Service: Pain Management;  Laterality: Right;  RIGHT RFA L2,3,4,5  2 of 2    RADIOFREQUENCY ABLATION Left 12/19/2019    Procedure: RADIOFREQUENCY ABLATION, LEFT L2-L3-L4-L5 MEDIAL BRANCH 1 OF 2;  Surgeon: Lina Wagner MD;  Location: [facility];  Service: Pain Management;  Laterality: Left;    RADIOFREQUENCY ABLATION Right 12/31/2019    Procedure: RADIOFREQUENCY ABLATION, RIGHT L2-L3-L4-L5  2 OF 2;  Surgeon: Lina Wagenr MD;  Location: [facility];  Service: Pain Management;  Laterality: Right;    RADIOFREQUENCY ABLATION Right 10/13/2020    Procedure: RADIOFREQUENCY ABLATION, Genicular Cooled 1 of 2;  Surgeon: Lina Wagner MD;  Location: [facility];  Service: Pain Management;  Laterality: Right;    RADIOFREQUENCY ABLATION Left 11/3/2020    Procedure: RADIOFREQUENCY ABLATION, GENICULAR COOLED  2 OF 2;  Surgeon: Lina Wagner MD;  Location: [facility];  Service: Pain Management;  Laterality: Left;    RADIOFREQUENCY ABLATION Left 12/15/2020    Procedure: RADIOFREQUENCY ABLATION LEFT L2,3,4,5 1 of 2;  Surgeon: Lina Wagner MD;  Location: [facility];  Service: Pain Management;  Laterality: Left;    RADIOFREQUENCY ABLATION Right 12/29/2020    Procedure: RADIOFREQUENCY ABLATION RIGHT L2,3,4,5 2 of 2;   Surgeon: Lina Wagner MD;  Location: [facility];  Service: Pain Management;  Laterality: Right;    RADIOFREQUENCY ABLATION Left 6/29/2021    Procedure: RADIOFREQUENCY ABLATION GENICULAR COOLED;  Surgeon: Lina Wagner MD;  Location: [facility];  Service: Pain Management;  Laterality: Left;  1 of 2    RADIOFREQUENCY ABLATION Right 7/13/2021    Procedure: RADIOFREQUENCY ABLATION GENICULAR;  Surgeon: Lina Wagner MD;  Location: [facility];  Service: Pain Management;  Laterality: Right;  2 of 2    RADIOFREQUENCY ABLATION Right 8/24/2021    Procedure: RADIOFREQUENCY ABLATION, L2-L3-L4-L5 MEDIAL BRANCH 1 OF 2;  Surgeon: Lina Wagner MD;  Location: [facility];  Service: Pain Management;  Laterality: Right;    RADIOFREQUENCY ABLATION Left 9/11/2021    Procedure: RADIOFREQUENCY ABLATION, L2-L3-L4-L5 MEDIAL BR ANCH 2 OF 2;  Surgeon: Lina Wagner MD;  Location: [facility];  Service: Pain Management;  Laterality: Left;    RADIOFREQUENCY ABLATION Right 3/7/2022    Procedure: RADIOFREQUENCY ABLATION RIGHT L3,4,5 1 of 2, needs consent;  Surgeon: Israel Hurtado MD;  Location: [facility];  Service: Pain Management;  Laterality: Right;    RADIOFREQUENCY ABLATION Left 3/21/2022    Procedure: RADIOFREQUENCY ABLATION RIGHT L3,4,5 2 of 2, needs consent;  Surgeon: Israel Huratdo MD;  Location: [facility];  Service: Pain Management;  Laterality: Left;    RADIOFREQUENCY ABLATION Right 5/9/2022    Procedure: RADIOFREQUENCY ABLATION RIGHT GENICULAR COOLED 1 of 2;  Surgeon: Israel Hurtado MD;  Location: [facility];  Service: Pain Management;  Laterality: Right;    RADIOFREQUENCY ABLATION Left 5/23/2022    Procedure: RADIOFREQUENCY ABLATION LEFT GENICULAR COOLED  2 of 2;  Surgeon: Israel Hurtado MD;  Location: [facility];  Service: Pain Management;  Laterality: Left;    RADIOFREQUENCY ABLATION OF LUMBAR MEDIAL BRANCH NERVE AT SINGLE LEVEL Left 6/26/2018    Procedure: RADIOFREQUENCY  ABLATION, NERVE, MEDIAL BRANCH, LUMBAR, 1 LEVEL;  Surgeon: Lina Wagner MD;  Location: [facility];  Service: Pain Management;  Laterality: Left;  Left Cooled RFA @ L2,3,4,5  [id number]  with Sedation    1 of 2    RADIOFREQUENCY ABLATION OF LUMBAR MEDIAL BRANCH NERVE AT SINGLE LEVEL Right 7/10/2018    Procedure: RADIOFREQUENCY ABLATION, NERVE, MEDIAL BRANCH, LUMBAR, 1 LEVEL;  Surgeon: Lina Wagner MD;  Location: [facility];  Service: Pain Management;  Laterality: Right;  RIght Cooled RFA @ L2,3,4,5  [id number] with Sedation    2 of 2    TRIGGER FINGER RELEASE Right 2017    TRIGGER FINGER RELEASE Left 7/29/2019    Procedure: RELEASE, TRIGGER FINGER, Left Thumb;  Surgeon: Velma Garcia MD;  Location: [facility] OR;  Service: Orthopedics;  Laterality: Left;  Local w/ MAC       MEDICATIONS AND ALLERGIES:      Current Outpatient Medications:     albuterol (VENTOLIN HFA) 90 mcg/actuation inhaler, INHALE TWO PUFFS INTO LUNGS EVERY 4 TO 6 HOURS AS NEEDED FOR SHORTNESS OF BREATH AND FOR WHEEZING, Disp: 18 g, Rfl: 1    allopurinoL (ZYLOPRIM) 300 MG tablet, Take 1 tablet (300 mg total) by mouth 2 (two) times daily., Disp: 180 tablet, Rfl: 3    atorvastatin (LIPITOR) 20 MG tablet, Take 1 tablet (20 mg total) by mouth once daily., Disp: 90 tablet, Rfl: 3    b complex vitamins tablet, Take 1 tablet by mouth every other day. , Disp: , Rfl:     calcium citrate/vitamin D2 (ISIAH-CITRATE ORAL), Take 500 mg by mouth 2 (two) times daily., Disp: , Rfl:     colchicine (MITIGARE) 0.6 mg Cap, Take 1 capsule (0.6 mg total) by mouth daily as needed (flare up)., Disp: 60 capsule, Rfl: 3    cyanocobalamin (VITAMIN B-12) 1000 MCG tablet, Take 100 mcg by mouth every morning., Disp: , Rfl:     diclofenac sodium (VOLTAREN) 1 % Gel, Apply 2 g topically 4 (four) times daily as needed. To painful area on the feet., Disp: 500 g, Rfl: 5    FLUoxetine (PROZAC) 20 mg/5 mL (4 mg/mL) solution, Take 5 mLs (20 mg total) by  mouth once daily., Disp: 450 mL, Rfl: 3    fluticasone propionate (FLONASE) 50 mcg/actuation nasal spray, 1 spray (50 mcg total) by Each Nostril route 2 (two) times daily as needed for Rhinitis., Disp: 15 g, Rfl: 6    indomethacin (INDOCIN) 50 MG capsule, TAKE 1 CAPSULE BY MOUTH 2 TIMES DAILY WITH MEALS, Disp: 30 capsule, Rfl: 2    LIDOcaine (XYLOCAINE) 5 % Oint ointment, APPLY TOPICALLY AS NEEDED., Disp: 35.44 g, Rfl: 6    MULTIVIT-IRON-MIN-FOLIC ACID 3,500-18-0.4 UNIT-MG-MG ORAL CHEW, Take 1 tablet by mouth 2 (two) times daily. , Disp: , Rfl:     nystatin (MYCOSTATIN) powder, Apply topically 2 (two) times daily., Disp: 60 g, Rfl: 3    omeprazole (PRILOSEC) 20 MG capsule, Take 1 capsule (20 mg total) by mouth every morning., Disp: 90 capsule, Rfl: 3    oxybutynin (DITROPAN-XL) 5 MG TR24, Take 1 tablet (5 mg total) by mouth once daily., Disp: 90 tablet, Rfl: 3    oxyCODONE-acetaminophen (PERCOCET)  mg per tablet, Take 1 tablet by mouth every 8 (eight) hours as needed for Pain., Disp: 90 tablet, Rfl: 0    vitamin D 185 MG Tab, Take 5,000 mg by mouth every morning. , Disp: , Rfl:   No current facility-administered medications for this visit.    Facility-Administered Medications Ordered in Other Visits:     0.9%  NaCl infusion, , Intravenous, Continuous, Lina Wagner MD, Last Rate: 25 mL/hr at 12/15/20 1506, 25 mL/hr at 12/15/20 1506    Review of patient's allergies indicates:   Allergen Reactions    Strawberry Anaphylaxis       Family History   Problem Relation Age of Onset    Diabetes Mother     Cataracts Mother     Diabetes Father     Cataracts Father     Hypertension Sister     Diabetes Sister     No Known Problems Sister     Cancer Sister         lymphoma     Coronary artery disease Brother     Diabetes Brother     Diabetes Brother     Hypotension Brother     Kidney failure Brother     Hypotension Brother     Amblyopia Neg Hx     Blindness Neg Hx     Glaucoma Neg Hx      Macular degeneration Neg Hx     Retinal detachment Neg Hx     Strabismus Neg Hx     Stroke Neg Hx     Thyroid disease Neg Hx     Anesthesia problems Neg Hx        Social History     Socioeconomic History    Marital status:    Tobacco Use    Smoking status: Never Smoker    Smokeless tobacco: Never Used   Substance and Sexual Activity    Alcohol use: Yes     Comment: occasionally     Drug use: No    Sexual activity: Yes     Partners: Female     Birth control/protection: None   Social History Narrative    Disabled. The patient is the youngest of 6 siblings. Single. Lives with single-sex partner.                    COMPREHENSIVE GYN HISTORY:  PAP History: Denies abnormal Paps.  Infection History: Denies STDs. Denies PID.  Benign History: Denies uterine fibroids. Denies ovarian cysts. Denies endometriosis. Denies other conditions.  Cancer History: Denies cervical cancer. Denies uterine cancer or hyperplasia. Denies ovarian cancer. Denies vulvar cancer or pre-cancer. Denies vaginal cancer or pre-cancer. Denies breast cancer. Denies colon cancer.  Sexual Activity History: Reports currently being sexually active  Menstrual History: Monthly. Mod then light flow.   Dysmenorrhea History: Reports mild dysmenorrhea.       ROS:  GENERAL: No weight changes. No swelling. No fatigue. No fever.  CARDIOVASCULAR: No chest pain. No shortness of breath. No leg cramps.   NEUROLOGICAL: No headaches. No vision changes.  BREASTS: No pain. No lumps. No discharge.  ABDOMEN: No pain. No nausea. No vomiting. No diarrhea. No constipation.  REPRODUCTIVE: No abnormal bleeding.   VULVA: No pain. No lesions. No itching.  VAGINA: No relaxation. No itching. No odor. No discharge. No lesions.  URINARY: No incontinence. No nocturia. No frequency. No dysuria.    /73   Wt 110.3 kg (243 lb 2.7 oz)   LMP 06/26/2018   BMI 40.47 kg/m²     PE:  APPEARANCE: Well nourished, well developed, in no acute distress.  AFFECT: WNL, alert and  oriented x 3.  SKIN: No acne or hirsutism.  NECK: Neck symmetric, without masses or thyromegaly.  NODES: No inguinal, cervical, axillary or femoral lymph node enlargement.  CHEST: Good respiratory effort.   ABDOMEN: Soft. No tenderness or masses. No hepatosplenomegaly. No hernias.  BREASTS: Symmetrical, no skin changes, visible lesions, palpable masses or nipple discharge bilaterally.  PELVIC: External female genitalia without lesions.  Female hair distribution. Adequate perineal body, Normal urethral meatus. Vagina moist and well rugated without lesions or discharge.  No significant cystocele or rectocele present. Cervix pink without lesions, discharge or tenderness. Uterus is 4-6 week size, regular, mobile and nontender. Adnexa without masses or tenderness.  EXTREMITIES: No edema    DIAGNOSIS:  1. Encounter for gynecological examination without abnormal finding    2. Visit for screening mammogram    3. Recurrent major depressive disorder, in partial remission    4. Mild intermittent asthma without complication    5. Mixed hyperlipidemia    6. BMI 39.0-39.9,adult    7. History of sleeve gastrectomy    8. Type 2 diabetes mellitus without complication, without long-term current use of insulin    9. Gastroesophageal reflux disease, unspecified whether esophagitis present    10. Screen for STD (sexually transmitted disease)        PLAN:    Orders Placed This Encounter    Mammo Digital Screening Bilat w/ [facility]    Hepatitis Panel, Acute    HIV 1/2 Ag/Ab (4th Gen)    RPR       COUNSELING:  The patient was counseled today on:  -A.C.S. Pap and pelvic exam guidelines (pap every 3 years), recomendations for yearly mammogram;  -to follow up with her PCP for other health maintenance.    FOLLOW-UP with me annually.

## 2022-08-01 NOTE — PROGRESS NOTES
Patient presents after having a panniculectomy.  She has done well she has a very nice result.  All incisions are healing nicely.  She can return to clinic in several months.

## 2022-08-02 ENCOUNTER — PATIENT MESSAGE (OUTPATIENT)
Dept: BARIATRICS | Facility: CLINIC | Age: 57
End: 2022-08-02
Payer: MEDICARE

## 2022-08-04 ENCOUNTER — PATIENT MESSAGE (OUTPATIENT)
Dept: BARIATRICS | Facility: CLINIC | Age: 57
End: 2022-08-04
Payer: MEDICARE

## 2022-08-08 ENCOUNTER — TELEPHONE (OUTPATIENT)
Dept: SPINE | Facility: CLINIC | Age: 57
End: 2022-08-08
Payer: MEDICARE

## 2022-08-08 ENCOUNTER — TELEPHONE (OUTPATIENT)
Dept: PAIN MEDICINE | Facility: CLINIC | Age: 57
End: 2022-08-08
Payer: MEDICARE

## 2022-08-08 NOTE — TELEPHONE ENCOUNTER
Staff contacted patient in regards to a message about a refill on medication Percocet 10/325mg (pt last office visit was 4/13/22).      Staff verbalized to patient she's due to follow up with an NP or Pain provider in order to continue medication medication refill (pt agree to next available appt).      Staff was able to schedule pt for 8/09/22 at 10:40a with NP Adeola Robledo.      Pt verbalized understanding and accepted the appt

## 2022-08-08 NOTE — TELEPHONE ENCOUNTER
Good Morning     This is an appointment reminder about your schedule Virtual Visit with Pain Management Provider Adeola Robledo NP.   Your appointment is for August 09, 2022 at 10:00am.     Please log into your [facility] 15 min's prior to your appointment time to e-precheck, verify all corresponding pages, and troubleshoot any problems that may occur. Once your device has been verify as compatible, and you receive the green check,  you must hit/ select the start visit button, This will notify your provider that you are ready to start the Virtual Video Visit.     As [facility] is a teaching [facility], your visit may be started with a resident or a fellow that is rounding in clinic, then proceeded by your physician.    Once logged on, please allow the provider 20 -30 mins to check-in, in case running behind.       If you experience any technical issue please contact [phone]        Thank You for entrusting [facility] to handle your care

## 2022-08-08 NOTE — TELEPHONE ENCOUNTER
Good Afternoon     This is an appointment reminder about your schedule Virtual Visit with Pain Management Provider Adeola aburto NP.   Your appointment is for August 09, 2022 at 10:40am.     Please log into your [facility] 15 min's prior to your appointment time to e-precheck, verify all corresponding pages, and troubleshoot any problems that may occur. Once your device has been verify as compatible, and you receive the green check,  you must hit/ select the start visit button, This will notify your provider that you are ready to start the Virtual Video Visit.     As [facility] is a teaching hospital, your visit may be started with a resident or a fellow that is rounding in clinic, then proceeded by your physician.    Once logged on, please allow the provider 20 -30 mins to check-in, in case running behind.       If you experience any technical issue please contact [phone]        Thank You for entrusting [facility] to handle your care     Disregard  Last one.

## 2022-08-09 ENCOUNTER — OFFICE VISIT (OUTPATIENT)
Dept: PAIN MEDICINE | Facility: CLINIC | Age: 57
End: 2022-08-09
Payer: MEDICARE

## 2022-08-09 DIAGNOSIS — M25.561 CHRONIC PAIN OF BOTH KNEES: ICD-10-CM

## 2022-08-09 DIAGNOSIS — G89.4 CHRONIC PAIN SYNDROME: Primary | ICD-10-CM

## 2022-08-09 DIAGNOSIS — M51.36 DDD (DEGENERATIVE DISC DISEASE), LUMBAR: ICD-10-CM

## 2022-08-09 DIAGNOSIS — G89.29 CHRONIC PAIN OF BOTH KNEES: ICD-10-CM

## 2022-08-09 DIAGNOSIS — M47.816 LUMBAR SPONDYLOSIS: ICD-10-CM

## 2022-08-09 DIAGNOSIS — M47.816 SPONDYLOSIS OF LUMBAR REGION WITHOUT MYELOPATHY OR RADICULOPATHY: ICD-10-CM

## 2022-08-09 DIAGNOSIS — Z96.653 STATUS POST TOTAL BILATERAL KNEE REPLACEMENT: ICD-10-CM

## 2022-08-09 DIAGNOSIS — M53.3 SACROILIAC JOINT PAIN: ICD-10-CM

## 2022-08-09 DIAGNOSIS — M25.562 CHRONIC PAIN OF BOTH KNEES: ICD-10-CM

## 2022-08-09 DIAGNOSIS — G89.4 CHRONIC PAIN SYNDROME: ICD-10-CM

## 2022-08-09 PROCEDURE — 99213 PR OFFICE/OUTPT VISIT, EST, LEVL III, 20-29 MIN: ICD-10-PCS | Mod: 95,,, | Performed by: NURSE PRACTITIONER

## 2022-08-09 PROCEDURE — 99213 OFFICE O/P EST LOW 20 MIN: CPT | Mod: 95,,, | Performed by: NURSE PRACTITIONER

## 2022-08-09 RX ORDER — OXYCODONE AND ACETAMINOPHEN 10; 325 MG/1; MG/1
1 TABLET ORAL EVERY 8 HOURS PRN
Qty: 90 TABLET | Refills: 0 | Status: SHIPPED | OUTPATIENT
Start: 2022-08-09 | End: 2022-09-11 | Stop reason: SDUPTHER

## 2022-08-09 NOTE — PROGRESS NOTES
Chronic patient Established Note (Follow up visit)  Chronic Pain-Tele-Medicine-Established Note (Follow up visit)        The patient location is: Home  The chief complaint leading to consultation is: pain  Visit type: Virtual visit with synchronous audio and video  Total time spent with patient: 25 min  Each patient to whom he or she provides medical services by telemedicine is:  (1) informed of the relationship between the physician and patient and the respective role of any other health care provider with respect to management of the patient; and (2) notified that he or she may decline to receive medical services by telemedicine and may withdraw from such care at any time.        SUBJECTIVE:    Interval History 8/9/2022:  Alivia Thomas presents to the clinic for a follow-up appointment for low back and knee pain. She is s/p right genicular RFA on 5/9/2022 and left genicular RFA on 5/22/2022. She reports 60% relief of her knee pain. She also had panniculectomy on 6/14/2022. She is healing well. She continues to report intermittent low back pain, worse with prolonged activity. She denies any radicular leg pain. Her pain is worse prolonged standing and walking. She continues to perform a homoe exercise routine. She continues to take Percocet as needed with benefit and without adverse effects. She denies any other health changes. Her pain today is 7/10.    Interval   The patient returns to clinic today for follow-up bilateral L3, 4, 5 RFA last month.  She reports that she is feeling very well following the procedure and reports 60-70% pain relief.  Today, she reports that her bilateral knee pain has continued to worsen, and she would like to go ahead and repeat bilateral genicular RFA as soon as possible.  She denies any new numbness, tingling, weakness, saddle anesthesia or bowel/bladder incontinence.    Interval History 12/28/2021:  The patient returns to clinic today for follow up via virtual visit. She  continues to report bilateral knee pain. This pain is worse with prolonged standing and walking. She continues to report low back and buttock pain. She denies any radicular leg pain. She continues to report a home exercise routine. She continues to report benefit with Percocet. She denies any adverse effects. She denies any other health changes.    Pain Disability Index Review:  Last 3 PDI Scores 4/19/2022 8/3/2021 6/7/2021   Pain Disability Index (PDI) 25 51 9       Pain Medications:  Percocet    Opioid Contract: yes     report:  Reviewed and consistent with medication use as prescribed.    Pain Procedures:   steroid inj and visco-supplementation (series of 3) in left knee- not helpful  3/31/14 Bilateral genicular nerve blocks  2/16/16 Bilateral L2,3,4,5 MBB  3/1/16 Bilateral L2,3,4,5 MBB   4/6/16 Left L2,3,4,5 RFA- significant relief  4/20/16 Right L2,3,4,5 RFA- significant relief  10/5/16 Left L2,3,4,5 cooled RFA  10/19/16 Right L2,3,4,5 cooled RFA  4/26/17 Left L2,3,4,5 cooled RFA  5/9/17 Right L2,3,4,5 cooled RFA  12/26/2017- Left L2,3,4,5 cooled RFA  1/9/2018- Right L2,3,4,5 cooled RFA  6/26/18 Left L2,3,4,5 cooled RFA- 60% relief  7/10/18 Right L2,3,4,5 cooled RFA- 60% relief  9/28/18 TPIs- significant relief  12/27/18 Left L2,3,4,5 RFA- 70% relief  1/29/19 Right L2,3,4,5 RFA- 70% relief  6/18/19 Left L2,3,4,5 RFA- 70% relief  7/9/19 Right L2,3,4,5 RFA- 50% relief  12/19/19 Left L2,3,4,5 RFA- 80% relief  12/31/19 Right L2,3,4,5 RFA- 50% relief  3/2/2020- Right genicular nerve block- 70% relief for one month  3/12/20 Left subacromial bursa injection- 90% relief  5/18/2020- Left genicular nerve block- 70%  6/9/2020- Bilateral SI joint injections- 50% relief  10/13/2020- Right genicular RFA- 50% relief   11/3/2020- Left genicular RFA- 50% relief  12/15/2020- Left L2,3,4,5 RFA  12/29/2020- Right L2,3,4,5 RFA  4/26/2021- Left subacromial bursa'  5/11/2021- Bilateral SI joint injections   6/28/2021- Left  genicular RFA  7/13/2021- Right genicular RFA  8/24/2021- Right L2,3,4,5 RFA  9/11/2021- Left L2,3,4,5 RFA  3/7/2022- Right L2,3,4,5 RFA  3/21/2022- Left L2,3,4,5 RFA  5/9/2022- Right genicular RFA  5/23/2022- Left genicular RFA    Physical Therapy/Home Exercise: yes    Imaging:   Xray Knee 3/6/2019:  FINDINGS:  Postop bilateral knee replacements.  The the the the irregularity about the left patella with soft tissue calcifications similar.  Small joint effusion.  Some irregularity of the right patella unchanged as well.     IMPRESSION:      Postop bilateral knee replacement with irregularity about the patella as above.    MRI Cervical Spine 4/24/2018:  FINDINGS:  There is reversal of the normal cervical lordosis which could be related to patient positioning versus muscle spasm.     Cervical vertebral body heights are well maintained without evidence of acute fracture or dislocation.  Marrow signal is unremarkable.     Spinal canal contents are unremarkable.  No abnormal masses or collections.     Individual levels as detailed below:     C2-3: No significant spinal canal stenosis or neuroforaminal narrowing.     C3-4: Facet arthropathy.  No significant spinal canal stenosis or neuroforaminal narrowing.     C4-5: Facet arthropathy.  Small broad-based disc osteophyte complex.  Mild right neuroforaminal narrowing.  Mild spinal canal stenosis.     C5-6: Facet arthropathy.  Uncovertebral joint spurring.  Broad-based posterior disc osteophyte complex.  Mild right neuroforaminal narrowing.  Mild spinal canal stenosis.     C6-7: Facet arthropathy.  No significant spinal canal stenosis or neuroforaminal narrowing.     C7-T1: No significant spinal canal stenosis or neuroforaminal narrowing.     Paraspinal muscles are unremarkable.  Soft tissues are intact.     IMPRESSION:      Mild multilevel cervical spondylosis with mild spinal canal stenosis and mild right neuroforaminal narrowing at C4-5 and C5-6.    Xray Lumbar Spine  9/21/2015:  Results: AP, lateral neutral, lateral flexion , lateral extension, bilateral oblique and spot views.  The alignment of the lumbar spine demonstrates a mild levoscoliosis .  11-mm anterior listhesis of L4 relative to L5 with no translational abnormalities   seen on flexion and extension views.  The vertebral body heights  are well-maintained  , mild disk space narrowing L4-L5 and L5-S1.   Mild anterior and marginal osteophyte formation seen  throughout the lumbar spine  .  The oblique views demonstrate no   definite spondylolysis.  There is facet joint osseous hypertrophy noted at L3-L4 and L4-L5.  IMPRESSION:         The Significant spondylosis of the lumbar spine with grade 1 anterior listhesis L4 relative to L5.  Facet joint osseous hypertrophy L3-L4 and L4-5.    Allergies:   Review of patient's allergies indicates:   Allergen Reactions    Strawberry Anaphylaxis       Current Medications:   Current Outpatient Medications   Medication Sig Dispense Refill    albuterol (VENTOLIN HFA) 90 mcg/actuation inhaler INHALE TWO PUFFS INTO LUNGS EVERY 4 TO 6 HOURS AS NEEDED FOR SHORTNESS OF BREATH AND FOR WHEEZING 18 g 1    allopurinoL (ZYLOPRIM) 300 MG tablet Take 1 tablet (300 mg total) by mouth 2 (two) times daily. 180 tablet 3    atorvastatin (LIPITOR) 20 MG tablet Take 1 tablet (20 mg total) by mouth once daily. 90 tablet 3    b complex vitamins tablet Take 1 tablet by mouth every other day.       calcium citrate/vitamin D2 (ISIAH-CITRATE ORAL) Take 500 mg by mouth 2 (two) times daily.      colchicine (MITIGARE) 0.6 mg Cap Take 1 capsule (0.6 mg total) by mouth daily as needed (flare up). 60 capsule 3    cyanocobalamin (VITAMIN B-12) 1000 MCG tablet Take 100 mcg by mouth every morning.      diclofenac sodium (VOLTAREN) 1 % Gel Apply 2 g topically 4 (four) times daily as needed. To painful area on the feet. 500 g 5    FLUoxetine (PROZAC) 20 mg/5 mL (4 mg/mL) solution Take 5 mLs (20 mg total) by mouth  once daily. 450 mL 3    fluticasone propionate (FLONASE) 50 mcg/actuation nasal spray 1 spray (50 mcg total) by Each Nostril route 2 (two) times daily as needed for Rhinitis. 15 g 6    indomethacin (INDOCIN) 50 MG capsule TAKE 1 CAPSULE BY MOUTH 2 TIMES DAILY WITH MEALS 30 capsule 2    LIDOcaine (XYLOCAINE) 5 % Oint ointment APPLY TOPICALLY AS NEEDED. 35.44 g 6    MULTIVIT-IRON-MIN-FOLIC ACID 3,500-18-0.4 UNIT-MG-MG ORAL CHEW Take 1 tablet by mouth 2 (two) times daily.       nystatin (MYCOSTATIN) powder Apply topically 2 (two) times daily. 60 g 3    omeprazole (PRILOSEC) 20 MG capsule Take 1 capsule (20 mg total) by mouth every morning. 90 capsule 3    oxybutynin (DITROPAN-XL) 5 MG TR24 Take 1 tablet (5 mg total) by mouth once daily. 90 tablet 3    oxyCODONE-acetaminophen (PERCOCET)  mg per tablet Take 1 tablet by mouth every 8 (eight) hours as needed for Pain. 90 tablet 0    vitamin D 185 MG Tab Take 5,000 mg by mouth every morning.        No current facility-administered medications for this visit.     Facility-Administered Medications Ordered in Other Visits   Medication Dose Route Frequency Provider Last Rate Last Admin    0.9%  NaCl infusion   Intravenous Continuous Lina Wagner MD 25 mL/hr at 12/15/20 1506 25 mL/hr at 12/15/20 1506       REVIEW OF SYSTEMS:    GENERAL:  No weight loss, malaise or fevers.  HEENT:  Negative for frequent or significant headaches.  NECK:  Negative for lumps, goiter, pain and significant neck swelling.  RESPIRATORY:  Negative for cough, wheezing or shortness of breath.  CARDIOVASCULAR:  Negative for chest pain, leg swelling or palpitations. HTN  GI:  Negative for abdominal discomfort, blood in stools or black stools or change in bowel habits. GERD  MUSCULOSKELETAL:  See HPI.  SKIN:  Negative for lesions, rash, and itching.  PSYCH:  Negative for sleep disturbance, mood disorder and recent psychosocial stressors.  HEMATOLOGY/LYMPHOLOGY:  Negative for prolonged  bleeding, bruising easily or swollen nodes.  NEURO:   No history of headaches, syncope, paralysis, seizures or tremors.  ENDO: Diabetes  All other reviewed and negative other than HPI.    Past Medical History:  Past Medical History:   Diagnosis Date    Allergy     Anemia     Asthma     Bilateral shoulder bursitis     Cervical stenosis of spine     Chronic pain     DDD (degenerative disc disease), cervical     Depression 1/28/2019    Diabetes mellitus type II     DJD (degenerative joint disease) of knee 6/19/2014    Facet arthritis of lumbar region 12/17/2015    Facet syndrome 12/17/2015    GERD (gastroesophageal reflux disease)     Heartburn     Hyperlipidemia     Hypertension     Morbid obesity     Neuromuscular disorder     PADDY (obstructive sleep apnea)     Proteinuria     Right carpal tunnel syndrome     Sacroiliitis 6/13/2018    Sacroiliitis     Sleep apnea        Past Surgical History:  Past Surgical History:   Procedure Laterality Date    CARPAL TUNNEL RELEASE      CARPAL TUNNEL RELEASE  1980s    left    CARPAL TUNNEL RELEASE  2012    right    COLONOSCOPY N/A 6/15/2020    Procedure: COLONOSCOPY;  Surgeon: Jc Koo MD;  Location: [facility] (4TH FLR);  Service: Endoscopy;  Laterality: N/A;  COVID screening on 6/13/20 at [facility]  pt updated on drop off location and no visitor [facility]-    ESOPHAGOGASTRODUODENOSCOPY N/A 3/27/2019    Procedure: EGD (ESOPHAGOGASTRODUODENOSCOPY);  Surgeon: Robbin Bar MD;  Location: [facility] (2ND FLR);  Service: Endoscopy;  Laterality: N/A;  BMI 61.3/2nd floor case/svn    INJECTION OF JOINT Bilateral 6/9/2020    Procedure: INJECTION, JOINT, BILATERAL SI;  Surgeon: Lina Wagner MD;  [facility];  Service: Pain Management;  Laterality: Bilateral;  B/L SI Joint Injection    INJECTION OF JOINT Bilateral 5/11/2021    Procedure: INJECTION, JOINT, SACROILIAC (SI) need consent;  Surgeon: Lina Wagner MD;  Location:  [facility] [place];  Service: Pain Management;  Laterality: Bilateral;    JOINT REPLACEMENT Bilateral     with 2 revisions on rt    KNEE SURGERY  3/2010    orthroscope    KNEE SURGERY  6-19-14    left TKR    LAPAROSCOPIC SLEEVE GASTRECTOMY N/A 8/28/2019    Procedure: GASTRECTOMY, SLEEVE, LAPAROSCOPIC with intraop EGD;  Surgeon: Heriberto Clements MD;  Location: [facility] OR 2ND FLR;  Service: General;  Laterality: N/A;    PANNICULECTOMY N/A 6/14/2022    Procedure: PANNICULECTOMY;  Surgeon: Rhett Gray MD;  Location: [facility] OR 2ND FLR;  Service: Plastics;  Laterality: N/A;    RADIOFREQUENCY ABLATION Right 7/9/2019    Procedure: RADIOFREQUENCY ABLATION;  Surgeon: Lina Wagner MD;  Location: [facility] [place];  Service: Pain Management;  Laterality: Right;  RIGHT RFA L2,3,4,5  2 of 2    RADIOFREQUENCY ABLATION Left 12/19/2019    Procedure: RADIOFREQUENCY ABLATION, LEFT L2-L3-L4-L5 MEDIAL BRANCH 1 OF 2;  Surgeon: Lina Wagner MD;  Location: [facility] [place];  Service: Pain Management;  Laterality: Left;    RADIOFREQUENCY ABLATION Right 12/31/2019    Procedure: RADIOFREQUENCY ABLATION, RIGHT L2-L3-L4-L5  2 OF 2;  Surgeon: Lina Wagner MD;  Location: [facility] [place];  Service: Pain Management;  Laterality: Right;    RADIOFREQUENCY ABLATION Right 10/13/2020    Procedure: RADIOFREQUENCY ABLATION, Genicular Cooled 1 of 2;  Surgeon: Lina Wagner MD;  Location: [facility] [place];  Service: Pain Management;  Laterality: Right;    RADIOFREQUENCY ABLATION Left 11/3/2020    Procedure: RADIOFREQUENCY ABLATION, GENICULAR COOLED  2 OF 2;  Surgeon: Lina Wagner MD;  Location: [facility] [place];  Service: Pain Management;  Laterality: Left;    RADIOFREQUENCY ABLATION Left 12/15/2020    Procedure: RADIOFREQUENCY ABLATION LEFT L2,3,4,5 1 of 2;  Surgeon: Lina Wagner MD;  Location: [facility] [place];  Service: Pain Management;  Laterality: Left;    RADIOFREQUENCY ABLATION Right 12/29/2020    Procedure:  RADIOFREQUENCY ABLATION RIGHT L2,3,4,5 2 of 2;  Surgeon: Lina Wagner MD;  Location: [facility] PAIN MGT;  Service: Pain Management;  Laterality: Right;    RADIOFREQUENCY ABLATION Left 6/29/2021    Procedure: RADIOFREQUENCY ABLATION GENICULAR COOLED;  Surgeon: Lina Wagner MD;  Location: [facility] PAIN MGT;  Service: Pain Management;  Laterality: Left;  1 of 2    RADIOFREQUENCY ABLATION Right 7/13/2021    Procedure: RADIOFREQUENCY ABLATION GENICULAR;  Surgeon: Lina Wagner MD;  Location: [facility]IN [facility]GT;  Service: Pain Management;  Laterality: Right;  2 of 2    RADIOFREQUENCY ABLATION Right 8/24/2021    Procedure: RADIOFREQUENCY ABLATION, L2-L3-L4-L5 MEDIAL BRANCH 1 OF 2;  Surgeon: Lina Wagner MD;  Location: [facility] PAIN MGT;  Service: Pain Management;  Laterality: Right;    RADIOFREQUENCY ABLATION Left 9/11/2021    Procedure: RADIOFREQUENCY ABLATION, L2-L3-L4-L5 MEDIAL BR ANCH 2 OF 2;  Surgeon: Lina Wagner MD;  Location: [facility]IN [facility]GT;  Service: Pain Management;  Laterality: Left;    RADIOFREQUENCY ABLATION Right 3/7/2022    Procedure: RADIOFREQUENCY ABLATION RIGHT L3,4,5 1 of 2, needs consent;  Surgeon: Israel Hurtado MD;  Location: [facility];  Service: Pain Management;  Laterality: Right;    RADIOFREQUENCY ABLATION Left 3/21/2022    Procedure: RADIOFREQUENCY ABLATION RIGHT L3,4,5 2 of 2, needs consent;  Surgeon: Israel Hurtado MD;  Location: [facility];  Service: Pain Management;  Laterality: Left;    RADIOFREQUENCY ABLATION Right 5/9/2022    Procedure: RADIOFREQUENCY ABLATION RIGHT GENICULAR COOLED 1 of 2;  Surgeon: Israel Hurtado MD;  Location: [facility];  Service: Pain Management;  Laterality: Right;    RADIOFREQUENCY ABLATION Left 5/23/2022    Procedure: RADIOFREQUENCY ABLATION LEFT GENICULAR COOLED  2 of 2;  Surgeon: Israel Hurtado MD;  Location: [facility];  Service: Pain Management;  Laterality: Left;    RADIOFREQUENCY ABLATION OF LUMBAR MEDIAL BRANCH NERVE AT SINGLE LEVEL  Left 6/26/2018    Procedure: RADIOFREQUENCY ABLATION, NERVE, MEDIAL BRANCH, LUMBAR, 1 LEVEL;  Surgeon: Lina Wagner MD;  Location: [facility]IN MGT;  Service: Pain Management;  Laterality: Left;  Left Cooled RFA @ L2,3,4,5  [id number]  with Sedation    1 of 2    RADIOFREQUENCY ABLATION OF LUMBAR MEDIAL BRANCH NERVE AT SINGLE LEVEL Right 7/10/2018    Procedure: RADIOFREQUENCY ABLATION, NERVE, MEDIAL BRANCH, LUMBAR, 1 LEVEL;  Surgeon: Lina Wagner MD;  Location: [facility];  Service: Pain Management;  Laterality: Right;  RIght Cooled RFA @ L2,3,4,5  [address] with Sedation    2 of 2    TRIGGER FINGER RELEASE Right 2017    TRIGGER FINGER RELEASE Left 7/29/2019    Procedure: RELEASE, TRIGGER FINGER, Left Thumb;  Surgeon: Velma Garcia MD;  Location: [facility] OR;  Service: Orthopedics;  Laterality: Left;  Local w/ MAC       Family History:  Family History   Problem Relation Age of Onset    Diabetes Mother     Cataracts Mother     Diabetes Father     Cataracts Father     Hypertension Sister     Diabetes Sister     No Known Problems Sister     Cancer Sister         lymphoma     Coronary artery disease Brother     Diabetes Brother     Diabetes Brother     Hypotension Brother     Kidney failure Brother     Hypotension Brother     Amblyopia Neg Hx     Blindness Neg Hx     Glaucoma Neg Hx     Macular degeneration Neg Hx     Retinal detachment Neg Hx     Strabismus Neg Hx     Stroke Neg Hx     Thyroid disease Neg Hx     Anesthesia problems Neg Hx        Social History:  Social History     Socioeconomic History    Marital status:    Tobacco Use    Smoking status: Never Smoker    Smokeless tobacco: Never Used   Substance and Sexual Activity    Alcohol use: Yes     Comment: occasionally     Drug use: No    Sexual activity: Yes     Partners: Female     Birth control/protection: None   Social History Narrative    Disabled. The patient is the youngest of 6 siblings. Single.  Lives with single-sex partner.                    OBJECTIVE:    Exam limited due to virtual visit:  GENERAL: Well appearing, in no acute distress, alert and oriented x3.   PSYCH:  Mood and affect appropriate.    Previous physical exam:  [facility] 2018     PHYSICAL EXAMINATION:    GENERAL: Well appearing, in no acute distress, alert and oriented x3.   PSYCH:  Mood and affect appropriate.  SKIN: Skin color, texture, turgor normal, no rashes or lesions.   HEAD/FACE:  Normocephalic, atraumatic.   CV: RRR with palpation of the radial artery.  PULM: No evidence of respiratory difficulty, symmetric chest rise.  BACK: Negative SLR bilaterally. There is no pain to palpation over the lumbar paraspinals. There is pain with palpation over lumbar facet joints bilaterally. Limited ROM with pain on extension.  Positive facet loading bilaterally  EXTREMITIES: There is pain with palpation to bilateral SI joints, right greater than left.  JENNA is negative bilaterally. Well-healed midline scars to bilateral knees.  There is mild pain with extension of right knee.    MUSCULOSKELETAL: Bilateral upper and lower extremity strength is normal and symmetric.  No atrophy or tone abnormalities are noted.  NEURO: Bilateral lower extremity coordination and muscle stretch reflexes are physiologic and symmetric.  Plantar response are downgoing. No clonus.  No loss of sensation is noted.  GAIT: Antalgic- ambulates without assistance.      ASSESSMENT: 56 y.o. year old female with low back pain, consistent with the followin. Chronic pain syndrome     2. Spondylosis of lumbar region without myelopathy or radiculopathy     3. DDD (degenerative disc disease), lumbar     4. Sacroiliac joint pain     5. Chronic pain of both knees     6. Status post total bilateral knee replacement           PLAN:     - Previous imaging reviewed today.    - She is s/p bilateral genicular RFA with benefit. We can repeat this as needed.    - We can repeat lumbar RFA  as needed.     - Continue Percocet 10/325 mg TID PRN. Refill provided.       - The patient is here today for a refill of current pain medications and they believe these provide effective pain control and improvements in quality of life.  The patient notes no serious side effects, and feels the benefits outweigh the risks.  The patient was reminded of the pain contract that they signed previously as well as the risks and benefits of the medication including possible death.  The updated [place] Board  Pharmacy prescription monitoring program was reviewed, and the patient has been found to be compliant with current treatment plan.     - Previous UDS reviewed. Repeat UDS next visit.     - I have stressed the importance of physical activity and a home exercise plan to help with pain and improve health.    - RTC in 3 months.     - Counseled patient regarding the importance of activity modification and constant sleeping habits.    - Dr. Hurtado was consulted on the patient and agrees with this plan.    The above plan and management options were discussed at length with patient. Patient is in agreement with the above and verbalized understanding.    Adeola Robledo  08/09/2022

## 2022-08-22 ENCOUNTER — OFFICE VISIT (OUTPATIENT)
Dept: PODIATRY | Facility: CLINIC | Age: 57
End: 2022-08-22
Payer: MEDICARE

## 2022-08-22 VITALS
HEART RATE: 66 BPM | DIASTOLIC BLOOD PRESSURE: 74 MMHG | SYSTOLIC BLOOD PRESSURE: 130 MMHG | HEIGHT: 65 IN | BODY MASS INDEX: 40.48 KG/M2 | WEIGHT: 243 LBS

## 2022-08-22 DIAGNOSIS — M79.671 RIGHT FOOT PAIN: ICD-10-CM

## 2022-08-22 DIAGNOSIS — E11.49 TYPE II DIABETES MELLITUS WITH NEUROLOGICAL MANIFESTATIONS: ICD-10-CM

## 2022-08-22 DIAGNOSIS — B35.1 DERMATOPHYTOSIS OF NAIL: ICD-10-CM

## 2022-08-22 DIAGNOSIS — L84 CORNS AND CALLUS: ICD-10-CM

## 2022-08-22 DIAGNOSIS — E11.9 ENCOUNTER FOR DIABETIC FOOT EXAM: Primary | ICD-10-CM

## 2022-08-22 PROCEDURE — 11721 DEBRIDE NAIL 6 OR MORE: CPT | Mod: Q9,PBBFAC,PN | Performed by: PODIATRIST

## 2022-08-22 PROCEDURE — 11056 PARNG/CUTG B9 HYPRKR LES 2-4: CPT | Mod: Q9,S$PBB,, | Performed by: PODIATRIST

## 2022-08-22 PROCEDURE — 11721 ROUTINE FOOT CARE: ICD-10-PCS | Mod: Q9,59,S$PBB, | Performed by: PODIATRIST

## 2022-08-22 PROCEDURE — 11056 ROUTINE FOOT CARE: ICD-10-PCS | Mod: Q9,S$PBB,, | Performed by: PODIATRIST

## 2022-08-22 PROCEDURE — 99499 NO LOS: ICD-10-PCS | Mod: S$PBB,,, | Performed by: PODIATRIST

## 2022-08-22 PROCEDURE — 99999 PR PBB SHADOW E&M-EST. PATIENT-LVL IV: ICD-10-PCS | Mod: PBBFAC,,, | Performed by: PODIATRIST

## 2022-08-22 PROCEDURE — 99214 OFFICE O/P EST MOD 30 MIN: CPT | Mod: PBBFAC,PN | Performed by: PODIATRIST

## 2022-08-22 PROCEDURE — 99499 UNLISTED E&M SERVICE: CPT | Mod: S$PBB,,, | Performed by: PODIATRIST

## 2022-08-22 PROCEDURE — 99999 PR PBB SHADOW E&M-EST. PATIENT-LVL IV: CPT | Mod: PBBFAC,,, | Performed by: PODIATRIST

## 2022-08-22 RX ORDER — LIDOCAINE 50 MG/G
OINTMENT TOPICAL
Qty: 35.44 G | Refills: 6 | Status: SHIPPED | OUTPATIENT
Start: 2022-08-22 | End: 2023-08-10

## 2022-08-22 NOTE — PROGRESS NOTES
Chief Complaint   Patient presents with    Diabetic Foot Exam     Foot Exam/PCP Clarisse Richardson MD  05/09/22           HPI:   The patient is a 56 y.o.  female  who presents for a diabetic foot exam/care   Patient reports + presence of abnormal sensation to the feet, just sometimes, mostly at night.   Patient has no history of wounds on the feet.   Hx of foot surgery: none.     Shoe gear: casual shoes  This patient has documented high risk feet requiring routine maintenance secondary to diabetes.     Main concern today is need for toenail trimming. Routine trimming helps with toe pain.   She was using vaseline/voltaren gel/lidocaine topical compound for heel pain.         Primary care doctor is: Clarisse Richardson MD  Patient last saw primary care doctor on:  5/9/2022        Patient Active Problem List   Diagnosis    Morbid obesity    PADDY (obstructive sleep apnea)    Type 2 diabetes mellitus without complication, without long-term current use of insulin    Nuclear sclerosis - Both Eyes    Hyperlipemia    Proteinuria    Type 2 diabetes mellitus without retinopathy - Both Eyes    Bilateral knee pain    DJD (degenerative joint disease) of knee    Debility    Prosthetic joint implant failure    Failed total knee arthroplasty    Right knee pain    Knee pain    History of total left knee replacement    Lumbago    CTS (carpal tunnel syndrome)    Right carpal tunnel syndrome    Chronic, continuous use of opioids    Mild intermittent asthma without complication    Left arm pain    Left shoulder pain    Allergic reaction to food    DDD (degenerative disc disease), cervical    Cervical radiculopathy    Cervical spondylosis    Cubital tunnel syndrome on right    Bilateral shoulder bursitis    Cervical stenosis of spinal canal    DDD (degenerative disc disease), thoracic    Thoracic spondylosis    Spondylolisthesis of lumbar region    Lumbar spondylosis    Spondylolisthesis of cervical  region    Cervical spine instability    Myeloradiculopathy    Neural foraminal stenosis of cervical spine    DDD (degenerative disc disease), lumbar    Idiopathic scoliosis of thoracolumbar spine    Bilateral shoulder region arthritis    Impingement syndrome of right shoulder    Trochanteric bursitis of both hips    Chronic pain    Depression    Recurrent major depressive disorder, in partial remission    GERD (gastroesophageal reflux disease)    Trigger finger    Dyspnea    BMI 45.0-49.9, adult    Sacroiliac joint pain    Spondylosis of lumbosacral region without myelopathy or radiculopathy    Subacromial bursitis of left shoulder joint    Sacroiliitis    Snoring    BMI 39.0-39.9,adult    S/P panniculectomy    Gout    History of sleeve gastrectomy    History of total right knee replacement    Noncompliance with CPAP treatment           Current Outpatient Medications on File Prior to Visit   Medication Sig Dispense Refill    albuterol (VENTOLIN HFA) 90 mcg/actuation inhaler INHALE TWO PUFFS INTO LUNGS EVERY 4 TO 6 HOURS AS NEEDED FOR SHORTNESS OF BREATH AND FOR WHEEZING 18 g 1    allopurinoL (ZYLOPRIM) 300 MG tablet Take 1 tablet (300 mg total) by mouth 2 (two) times daily. 180 tablet 3    atorvastatin (LIPITOR) 20 MG tablet Take 1 tablet (20 mg total) by mouth once daily. 90 tablet 3    b complex vitamins tablet Take 1 tablet by mouth every other day.       calcium citrate/vitamin D2 (ISIAH-CITRATE ORAL) Take 500 mg by mouth 2 (two) times daily.      colchicine (MITIGARE) 0.6 mg Cap Take 1 capsule (0.6 mg total) by mouth daily as needed (flare up). 60 capsule 3    cyanocobalamin (VITAMIN B-12) 1000 MCG tablet Take 100 mcg by mouth every morning.      diclofenac sodium (VOLTAREN) 1 % Gel Apply 2 g topically 4 (four) times daily as needed. To painful area on the feet. 500 g 5    FLUoxetine (PROZAC) 20 mg/5 mL (4 mg/mL) solution Take 5 mLs (20 mg total) by mouth once daily. 450 mL 3     fluticasone propionate (FLONASE) 50 mcg/actuation nasal spray 1 spray (50 mcg total) by Each Nostril route 2 (two) times daily as needed for Rhinitis. 15 g 6    indomethacin (INDOCIN) 50 MG capsule TAKE 1 CAPSULE BY MOUTH 2 TIMES DAILY WITH MEALS 30 capsule 2    MULTIVIT-IRON-MIN-FOLIC ACID 3,500-18-0.4 UNIT-MG-MG ORAL CHEW Take 1 tablet by mouth 2 (two) times daily.       nystatin (MYCOSTATIN) powder Apply topically 2 (two) times daily. 60 g 3    omeprazole (PRILOSEC) 20 MG capsule Take 1 capsule (20 mg total) by mouth every morning. 90 capsule 3    oxyCODONE-acetaminophen (PERCOCET)  mg per tablet Take 1 tablet by mouth every 8 (eight) hours as needed for Pain. 90 tablet 0    vitamin D 185 MG Tab Take 5,000 mg by mouth every morning.       [DISCONTINUED] LIDOcaine (XYLOCAINE) 5 % Oint ointment APPLY TOPICALLY AS NEEDED. 35.44 g 6    oxybutynin (DITROPAN-XL) 5 MG TR24 Take 1 tablet (5 mg total) by mouth once daily. 90 tablet 3     Current Facility-Administered Medications on File Prior to Visit   Medication Dose Route Frequency Provider Last Rate Last Admin    0.9%  NaCl infusion   Intravenous Continuous Lina Wagner MD 25 mL/hr at 12/15/20 1506 25 mL/hr at 12/15/20 1506       Review of patient's allergies indicates:  No Known Allergies                  ROS:  General ROS: negative  Respiratory ROS: no cough, shortness of breath, or wheezing  Cardiovascular ROS: no chest pain or dyspnea on exertion  Musculoskeletal ROS: negative  Neurological ROS:   negative for - gait disturbance, + numbness/tingling, seizures or tremors  Dermatological ROS: + nail changes, dry skin          LAST HbA1c:   Hemoglobin A1C   Date Value Ref Range Status   05/09/2022 5.9 (H) 4.0 - 5.6 % Final     Comment:     ADA Screening Guidelines:  5.7-6.4%  Consistent with prediabetes  >or=6.5%  Consistent with diabetes    High levels of fetal hemoglobin interfere with the HbA1C  assay. Heterozygous hemoglobin variants  "(HbS, HgC, etc)do  not significantly interfere with this assay.   However, presence of multiple variants may affect accuracy.     11/08/2021 6.3 (H) 4.0 - 5.6 % Final     Comment:     ADA Screening Guidelines:  5.7-6.4%  Consistent with prediabetes  >or=6.5%  Consistent with diabetes    High levels of fetal hemoglobin interfere with the HbA1C  assay. Heterozygous hemoglobin variants (HbS, HgC, etc)do  not significantly interfere with this assay.   However, presence of multiple variants may affect accuracy.     05/03/2021 6.5 (H) 4.0 - 5.6 % Final     Comment:     ADA Screening Guidelines:  5.7-6.4%  Consistent with prediabetes  >or=6.5%  Consistent with diabetes    High levels of fetal hemoglobin interfere with the HbA1C  assay. Heterozygous hemoglobin variants (HbS, HgC, etc)do  not significantly interfere with this assay.   However, presence of multiple variants may affect accuracy.           EXAM:   Vitals:    08/22/22 1353   BP: 130/74   Pulse: 66   Weight: 110.2 kg (243 lb)   Height: 5' 5" (1.651 m)       General: Pt. is well-developed, well-nourished, appears stated age, in no acute distress, alert and oriented x 3.      Vascular: Dorsalis pedis and posterior tibial pulses are 2/4 bilaterally. 3 secs capillary refill time and toes are warm to touch.    There is reduced hair growth on the feet.    There is 1+ edema.  no varicosities.      Neurological:  Light touch, proprioception, and sharp/dull sensation are all intact bilaterally. Protective threshold with the [place] monofilament is diminished bilaterally.   +paresthesias      Dermatological:  The skin is thin    The toenails  1-5 L and 1-5 R  are greenish- yellow, long by 5-6mm and thickened by 2-3mm with subungual debris  .   There are no open wounds. There is no ecchymoses.  no erythema noted.   Skin diffusely dry on the soles     Interdigital spaces are intact, no fissures    Skin atrophic, thin, dry and mildly hyperpigmented. "       Musculoskeletal:    Muscle strength is 5/5 in all groups bilaterally.    Metatarsophalangeal, subtalar, and ankle range of motion are intact without crepitus.     Ankle equinus bilateral       Assessment / Plan:      ICD-10-CM ICD-9-CM    1. Encounter for diabetic foot exam  E11.9 250.00    2. Type II diabetes mellitus with neurological manifestations  E11.49 250.60    3. Corns and callus  L84 700    4. Dermatophytosis of nail  B35.1 110.1    5. Right foot pain  M79.671 729.5 LIDOcaine (XYLOCAINE) 5 % Oint ointment       · I counseled the patient on the patient's conditions, their implications and medical management.   Shoe inspection.      Continue good nutrition and blood sugar control to help prevent podiatric complications of diabetes.    Maintain proper foot hygiene.    Continue wearing proper shoe gear, daily foot inspections, never walking without protective shoe gear, never putting sharp instruments to feet.  Shoe and activity modification as needed for relief.         Routine Foot Care    Date/Time: 8/22/2022 2:15 PM  Performed by: Stacey Rowland DPM  Authorized by: Stacey Rowland DPM     Consent Done?:  Yes (Verbal)  Hyperkeratotic Skin Lesions?: Yes    Number of trimmed lesions:  2  Location(s):  Left Plantar and Right Plantar    Nail Care Type:  Debride  Location(s): All  (Left 1st Toe, Left 3rd Toe, Left 2nd Toe, Left 4th Toe, Left 5th Toe, Right 1st Toe, Right 2nd Toe, Right 3rd Toe, Right 4th Toe and Right 5th Toe)  Patient tolerance:  Patient tolerated the procedure well with no immediate complications     With patient's permission, the toenails mentioned above were reduced and debrided using a nail nipper, removing offending nail and debris.   Utilizing a #15 scalpel, I trimmed the corns and calluses at the above mentioned location.      The patient will continue to monitor the areas daily, inspect the feet, wear protective shoe gear when ambulatory, and moisturizer to maintain skin  integrity.

## 2022-09-07 ENCOUNTER — PATIENT MESSAGE (OUTPATIENT)
Dept: BARIATRICS | Facility: CLINIC | Age: 57
End: 2022-09-07
Payer: MEDICARE

## 2022-09-09 ENCOUNTER — TELEPHONE (OUTPATIENT)
Dept: SPINE | Facility: CLINIC | Age: 57
End: 2022-09-09
Payer: MEDICARE

## 2022-09-09 NOTE — TELEPHONE ENCOUNTER
----- Message from Angela Forrester sent at 9/9/2022  9:50 AM CDT -----  Regarding: Medication  Refill  Request              Name and Strength:        oxyCODONE-acetaminophen (PERCOCET)  mg per tablet    How is the patient currently  Sig - Route: Take 1 tablet by mouth every 8 (eight) hours as needed for Pain. - Oral    Dispense:   90 tablet     Preferred Pharmacy with phone number: [facility] - [address]     Phone: [phone]  Fax:  [phone]    Local Order:   yes     Ordering Provider:  Israel Hurtdao MD        Would the patient rather a call back or a response via [facility]?  Yes     Preferred Call Back: [phone] ([phone])       Additional  Notes

## 2022-09-09 NOTE — TELEPHONE ENCOUNTER
Staff set a remind me in regards to refilling patient medication (provider is out on Friday's, patient was told in the past to notify clinical staff 3 to 5 days prior because he does not work on Friday's, or Holidays).

## 2022-09-11 DIAGNOSIS — M47.816 LUMBAR SPONDYLOSIS: ICD-10-CM

## 2022-09-11 DIAGNOSIS — G89.4 CHRONIC PAIN SYNDROME: ICD-10-CM

## 2022-09-11 RX ORDER — OXYCODONE AND ACETAMINOPHEN 10; 325 MG/1; MG/1
1 TABLET ORAL EVERY 8 HOURS PRN
Qty: 90 TABLET | Refills: 0 | Status: SHIPPED | OUTPATIENT
Start: 2022-09-11 | End: 2022-10-10 | Stop reason: SDUPTHER

## 2022-09-12 DIAGNOSIS — G89.4 CHRONIC PAIN SYNDROME: ICD-10-CM

## 2022-09-12 DIAGNOSIS — M47.816 LUMBAR SPONDYLOSIS: ICD-10-CM

## 2022-09-12 RX ORDER — OXYCODONE AND ACETAMINOPHEN 10; 325 MG/1; MG/1
1 TABLET ORAL EVERY 8 HOURS PRN
Qty: 90 TABLET | Refills: 0 | OUTPATIENT
Start: 2022-09-12

## 2022-09-12 NOTE — TELEPHONE ENCOUNTER
----- Message from Lex Pichardo MA sent at 9/9/2022  9:59 AM CDT -----  Angela Wood Staff  Caller: Unspecified (Today,  9:50 AM)              Name and Strength:        oxyCODONE-acetaminophen (PERCOCET)  mg per tablet     How is the patient currently  Sig - Route: Take 1 tablet by mouth every 8 (eight) hours as needed for Pain. - Oral     Dispense:   90 tablet     Preferred Pharmacy with phone number: [facility] - [address] [place]     Phone: [phone]   Fax: [phone]     Local Order:   yes     Ordering Provider:  Israel Hurtado MD        Would the patient rather a call back or a response via [facility]?  Yes     Preferred Call Back: [phone] (B)       Additional  Notes

## 2022-09-12 NOTE — TELEPHONE ENCOUNTER
Patient requesting refill on Percocet 10/325mg  Last office visit 08.09.22   shows last refill on 08.10.22  Patient  have a pain contract on file with [facility] department  Patient last UDS 06.09.22 was consistent with current therapy     CODEINE  Not Detected   Not Detected   Not Detected  Not Detected     MORPHINE  Not Detected   Not Detected   Not Detected  Not Detected     6-ACETYLMORPHINE  Not Detected   Not Detected   Not Detected  Not Detected     OXYCODONE  Present   Not Detected   Not Detected  Present     NOROYXCODONE  Present   Present   Present  Present     OXYMORPHONE  Present   Not Detected   Not Detected  Not Detected     NOROXYMORPHONE  Not Detected   Not Detected   Not Detected  Not Detected     HYDROCODONE  Not Detected   Not Detected   Not Detected  Not Detected     NORHYDROCODONE  Not Detected   Not Detected   Not Detected  Not Detected     HYDROMORPHONE  Not Detected   Not Detected   Not Detected  Not Detected     BUPRENORPHINE  Not Detected   Not Detected   Not Detected  Not Detected     NORUBPRENORPHINE  Not Detected   Not Detected   Not Detected  Not Detected     FENTANYL  Not Detected   Not Detected   Not Detected  Not Detected     NORFENTANYL  Not Detected   Not Detected   Not Detected  Not Detected     MEPERIDINE METABOLITE  Not Detected   Not Detected   Not Detected  Not Detected     TAPENTADOL  Not Detected   Not Detected   Not Detected  Not Detected     TAPENTADOL-O-SULF  Not Detected   Not Detected   Not Detected  Not Detected     METHADONE  Not Detected   Not Detected   Not Detected  Not Detected     TRAMADOL  Not Detected   Not Detected   Not Detected  Not Detected     AMPHETAMINE  Not Detected   Not Detected   Not Detected  Not Detected     METHAMPHETAMINE  Not Detected   Not Detected   Not Detected  Not Detected     MDMA- ECSTASY  Not Detected   Not Detected   Not Detected  Not Detected     MDA  Not Detected   Not Detected   Not Detected  Not Detected      MDEA- Kait  Not Detected   Not Detected   Not Detected  Not Detected     METHYLPHENIDATE  Not Detected   Not Detected   Not Detected  Not Detected     PHENTERMINE  Not Detected   Not Detected   Not Detected  Not Detected     BENZOYLECGONINE  Not Detected   Not Detected   Not Detected  Not Detected     ALPRAZOLAM  Not Detected   Not Detected   Not Detected  Not Detected     ALPHA-OH-ALPRAZOLAM  Not Detected   Not Detected   Not Detected  Not Detected     CLONAZEPAM  Not Detected   Not Detected   Not Detected  Not Detected     7-AMINOCLONAZEPAM  Not Detected   Not Detected   Not Detected  Not Detected     DIAZEPAM  Not Detected   Not Detected   Not Detected  Not Detected     NORDIAZEPAM  Not Detected   Not Detected   Not Detected  Not Detected     OXAZEPAM  Not Detected   Not Detected   Not Detected  Not Detected     TEMAZEPAM  Not Detected   Not Detected   Not Detected  Not Detected     Lorazepam  Not Detected   Not Detected   Not Detected  Not Detected     MIDAZOLAM  Not Detected   Not Detected   Not Detected  Not Detected     ZOLPIDEM  Not Detected   Not Detected   Not Detected  Not Detected     BARBITURATES  Not Detected   Not Detected   Not Detected  Not Detected     Creatinine, Urine 20.0 - 400.0 mg/dL 93.0  86.0 R, CM  66.1  113.0 R, CM  91.8  163.4  301.0 R, CM    ETHYL GLUCURONIDE  Not Detected   Not Detected   Not Detected  Not Detected     MARIJUANA METABOLITE  Not Detected   Not Detected   Not Detected  Not Detected     PCP  Not Detected   Not Detected   Not Detected  Not Detected     CARISOPRODOL  Not Detected   Not Detected CM   Not Detected CM  Not Detected CM     Comment: The carisoprodol immunoassay has cross-reactivity to   carisoprodol and meprobamate.    Naloxone  Not Detected          Gabapentin  Not Detected          Pregabalin  Not Detected          Alpha-OH-Midazolam  Not Detected          Zolpidem Metabolite  Not Detected

## 2022-10-03 DIAGNOSIS — Z71.89 COMPLEX CARE COORDINATION: ICD-10-CM

## 2022-10-05 ENCOUNTER — OFFICE VISIT (OUTPATIENT)
Dept: PAIN MEDICINE | Facility: CLINIC | Age: 57
End: 2022-10-05
Attending: ANESTHESIOLOGY
Payer: MEDICARE

## 2022-10-05 ENCOUNTER — HOSPITAL ENCOUNTER (OUTPATIENT)
Dept: RADIOLOGY | Facility: OTHER | Age: 57
Discharge: HOME | End: 2022-10-05
Attending: ANESTHESIOLOGY
Payer: MEDICARE

## 2022-10-05 ENCOUNTER — TELEPHONE (OUTPATIENT)
Dept: OPTOMETRY | Facility: CLINIC | Age: 57
End: 2022-10-05
Payer: MEDICARE

## 2022-10-05 VITALS
SYSTOLIC BLOOD PRESSURE: 138 MMHG | BODY MASS INDEX: 40.4 KG/M2 | TEMPERATURE: 97 F | HEART RATE: 60 BPM | DIASTOLIC BLOOD PRESSURE: 88 MMHG | RESPIRATION RATE: 18 BRPM | HEIGHT: 65 IN | WEIGHT: 242.5 LBS

## 2022-10-05 DIAGNOSIS — M47.26 OSTEOARTHRITIS OF SPINE WITH RADICULOPATHY, LUMBAR REGION: ICD-10-CM

## 2022-10-05 DIAGNOSIS — M47.26 OSTEOARTHRITIS OF SPINE WITH RADICULOPATHY, LUMBAR REGION: Primary | ICD-10-CM

## 2022-10-05 DIAGNOSIS — M17.0 PRIMARY OSTEOARTHRITIS OF BOTH KNEES: ICD-10-CM

## 2022-10-05 DIAGNOSIS — M54.9 INTRACTABLE BACK PAIN: ICD-10-CM

## 2022-10-05 PROCEDURE — 99214 OFFICE O/P EST MOD 30 MIN: CPT | Mod: S$PBB,,, | Performed by: ANESTHESIOLOGY

## 2022-10-05 PROCEDURE — 99999 PR PBB SHADOW E&M-EST. PATIENT-LVL V: ICD-10-PCS | Mod: PBBFAC,,, | Performed by: ANESTHESIOLOGY

## 2022-10-05 PROCEDURE — 99214 PR OFFICE/OUTPT VISIT, EST, LEVL IV, 30-39 MIN: ICD-10-PCS | Mod: S$PBB,,, | Performed by: ANESTHESIOLOGY

## 2022-10-05 PROCEDURE — 99999 PR PBB SHADOW E&M-EST. PATIENT-LVL V: CPT | Mod: PBBFAC,,, | Performed by: ANESTHESIOLOGY

## 2022-10-05 PROCEDURE — 99215 OFFICE O/P EST HI 40 MIN: CPT | Mod: PBBFAC | Performed by: ANESTHESIOLOGY

## 2022-10-05 NOTE — PROGRESS NOTES
Subjective:      Patient ID: Alivia Thomas is a 56 y.o. female.    Chief Complaint: Low-back Pain and Back Pain (B/l)    Referred by: No ref. provider found     HPI  She returns for follow-up.  Her knees are doing well.  She has some discomfort but not enough to do procedures.  Her main complaint is lumbar spine pain for the past few weeks that is radiating to the dorsal aspect of both lower extremities.  She has no red flag signs/symptoms.  No new bowel or bladder symptomatology.  The pain radiates from the back down to the plantar aspect of both feet.  It is activity related.  Rest helps some but it does not resolve the pain.  She has not been in therapy for a while.  No recent imaging.  No recent weight changes.  Otherwise, no new symptomatology.  She goes regularly to her primary care physician for health maintenance.      Past Medical History:   Diagnosis Date    Allergy     Anemia     Asthma     Bilateral shoulder bursitis     Cervical stenosis of spine     Chronic pain     DDD (degenerative disc disease), cervical     Depression 1/28/2019    Diabetes mellitus type II     DJD (degenerative joint disease) of knee 6/19/2014    Facet arthritis of lumbar region 12/17/2015    Facet syndrome 12/17/2015    GERD (gastroesophageal reflux disease)     Heartburn     Hyperlipidemia     Hypertension     Morbid obesity     Neuromuscular disorder     PADDY (obstructive sleep apnea)     Proteinuria     Right carpal tunnel syndrome     Sacroiliitis 6/13/2018    Sacroiliitis     Sleep apnea        Past Surgical History:   Procedure Laterality Date    CARPAL TUNNEL RELEASE      CARPAL TUNNEL RELEASE  1980s    left    CARPAL TUNNEL RELEASE  2012    right    COLONOSCOPY N/A 6/15/2020    Procedure: COLONOSCOPY;  Surgeon: Jc Koo MD;  Location: [address]);  Service: Endoscopy;  Laterality: N/A;  COVID screening on 6/13/20 at [facility]  pt updated on drop off location and no visitor policy-     ESOPHAGOGASTRODUODENOSCOPY N/A 3/27/2019    Procedure: EGD (ESOPHAGOGASTRODUODENOSCOPY);  Surgeon: Robbin Bar MD;  Location: [facility] ([address] [place] [address]);  Service: Endoscopy;  Laterality: N/A;  BMI 61.3/2nd floor case/svn    INJECTION OF JOINT Bilateral 6/9/2020    Procedure: INJECTION, JOINT, BILATERAL SI;  Surgeon: Lina Wagner MD;  Location: [facility] [place] [facility];  Service: Pain Management;  Laterality: Bilateral;  B/L SI Joint Injection    INJECTION OF JOINT Bilateral 5/11/2021    Procedure: INJECTION, JOINT, SACROILIAC (SI) need consent;  Surgeon: Lina Wagner MD;  Location: [facility] [place] [facility];  Service: Pain Management;  Laterality: Bilateral;    JOINT REPLACEMENT Bilateral     with 2 revisions on rt    KNEE SURGERY  3/2010    orthroscope    KNEE SURGERY  6-19-14    left TKR    LAPAROSCOPIC SLEEVE GASTRECTOMY N/A 8/28/2019    Procedure: GASTRECTOMY, SLEEVE, LAPAROSCOPIC with intraop EGD;  Surgeon: Heriberto Clements MD;  [facility];  Service: General;  Laterality: N/A;    PANNICULECTOMY N/A 6/14/2022    Procedure: PANNICULECTOMY;  Surgeon: Rhett Gray MD;  Location: [facility] OR [address] [place] [address];  Service: Plastics;  Laterality: N/A;    RADIOFREQUENCY ABLATION Right 7/9/2019    Procedure: RADIOFREQUENCY ABLATION;  Surgeon: Lina Wagner MD;  Location: [facility];  Service: Pain Management;  Laterality: Right;  RIGHT RFA L2,3,4,5  2 of 2    RADIOFREQUENCY ABLATION Left 12/19/2019    Procedure: RADIOFREQUENCY ABLATION, LEFT L2-L3-L4-L5 MEDIAL BRANCH 1 OF 2;  Surgeon: Lina Wagner MD;  Location: [facility] [place] [facility];  Service: Pain Management;  Laterality: Left;    RADIOFREQUENCY ABLATION Right 12/31/2019    Procedure: RADIOFREQUENCY ABLATION, RIGHT L2-L3-L4-L5  2 OF 2;  Surgeon: Lina Wagner MD;  Location: [facility] [place] [facility];  Service: Pain Management;  Laterality: Right;    RADIOFREQUENCY ABLATION Right 10/13/2020    Procedure: RADIOFREQUENCY ABLATION, Genicular Cooled 1 of 2;  Surgeon:  Lina Wagner MD;  Location: [facility] PAIN MGT;  Service: Pain Management;  Laterality: Right;    RADIOFREQUENCY ABLATION Left 11/3/2020    Procedure: RADIOFREQUENCY ABLATION, GENICULAR COOLED  2 OF 2;  Surgeon: Lina Wagner MD;  Location: [facility]IN [facility]GT;  Service: Pain Management;  Laterality: Left;    RADIOFREQUENCY ABLATION Left 12/15/2020    Procedure: RADIOFREQUENCY ABLATION LEFT L2,3,4,5 1 of 2;  Surgeon: Lina Wagner MD;  Location: [facility] PAIN [facility]GT;  Service: Pain Management;  Laterality: Left;    RADIOFREQUENCY ABLATION Right 12/29/2020    Procedure: RADIOFREQUENCY ABLATION RIGHT L2,3,4,5 2 of 2;  Surgeon: Lina Wagner MD;  Location: [facility] PAIN [facility]GT;  Service: Pain Management;  Laterality: Right;    RADIOFREQUENCY ABLATION Left 6/29/2021    Procedure: RADIOFREQUENCY ABLATION GENICULAR COOLED;  Surgeon: Lina aWgner MD;  Location: [facility]IN [facility]GT;  Service: Pain Management;  Laterality: Left;  1 of 2    RADIOFREQUENCY ABLATION Right 7/13/2021    Procedure: RADIOFREQUENCY ABLATION GENICULAR;  Surgeon: Lina Wagner MD;  Location: [facility] PAIN [facility]GT;  Service: Pain Management;  Laterality: Right;  2 of 2    RADIOFREQUENCY ABLATION Right 8/24/2021    Procedure: RADIOFREQUENCY ABLATION, L2-L3-L4-L5 MEDIAL BRANCH 1 OF 2;  Surgeon: Lina Wagner MD;  Location: [facility] PAIN [facility]GT;  Service: Pain Management;  Laterality: Right;    RADIOFREQUENCY ABLATION Left 9/11/2021    Procedure: RADIOFREQUENCY ABLATION, L2-L3-L4-L5 MEDIAL BR ANCH 2 OF 2;  Surgeon: Lina Wagner MD;  Location: [facility] PAIN [facility]GT;  Service: Pain Management;  Laterality: Left;    RADIOFREQUENCY ABLATION Right 3/7/2022    Procedure: RADIOFREQUENCY ABLATION RIGHT L3,4,5 1 of 2, needs consent;  Surgeon: Israel Hurtado MD;  Location: [facility] PAIN [facility]GT;  Service: Pain Management;  Laterality: Right;    RADIOFREQUENCY ABLATION Left 3/21/2022    Procedure: RADIOFREQUENCY ABLATION RIGHT L3,4,5 2 of 2, needs consent;  Surgeon: Israel Hurtado MD;   Location: [facility]IN [facility]GT;  Service: Pain Management;  Laterality: Left;    RADIOFREQUENCY ABLATION Right 5/9/2022    Procedure: RADIOFREQUENCY ABLATION RIGHT GENICULAR COOLED 1 of 2;  Surgeon: Israel Hurtado MD;  Location: [facility] PAIN [facility]GT;  Service: Pain Management;  Laterality: Right;    RADIOFREQUENCY ABLATION Left 5/23/2022    Procedure: RADIOFREQUENCY ABLATION LEFT GENICULAR COOLED  2 of 2;  Surgeon: Israel Hurtado MD;  Location: [facility] PAIN [facility]GT;  Service: Pain Management;  Laterality: Left;    RADIOFREQUENCY ABLATION OF LUMBAR MEDIAL BRANCH NERVE AT SINGLE LEVEL Left 6/26/2018    Procedure: RADIOFREQUENCY ABLATION, NERVE, MEDIAL BRANCH, LUMBAR, 1 LEVEL;  Surgeon: Lina Wagner MD;  Location: [facility]IN [facility]GT;  Service: Pain Management;  Laterality: Left;  Left Cooled RFA @ L2,3,4,5  [id number]  with Sedation    1 of 2    RADIOFREQUENCY ABLATION OF LUMBAR MEDIAL BRANCH NERVE AT SINGLE LEVEL Right 7/10/2018    Procedure: RADIOFREQUENCY ABLATION, NERVE, MEDIAL BRANCH, LUMBAR, 1 LEVEL;  Surgeon: Lina Wagner MD;  Location: [facility]IN [facility]GT;  Service: Pain Management;  Laterality: Right;  RIght Cooled RFA @ L2,3,4,5  [id number] with Sedation    2 of 2    TRIGGER FINGER RELEASE Right 2017    TRIGGER FINGER RELEASE Left 7/29/2019    Procedure: RELEASE, TRIGGER FINGER, Left Thumb;  Surgeon: Velma Garcia MD;  Location: [facility] OR;  Service: Orthopedics;  Laterality: Left;  Local w/ MAC       Review of patient's allergies indicates:   Allergen Reactions    Strawberry Anaphylaxis       Current Outpatient Medications   Medication Sig Dispense Refill    albuterol (VENTOLIN HFA) 90 mcg/actuation inhaler INHALE TWO PUFFS INTO LUNGS EVERY 4 TO 6 HOURS AS NEEDED FOR SHORTNESS OF BREATH AND FOR WHEEZING 18 g 1    allopurinoL (ZYLOPRIM) 300 MG tablet Take 1 tablet (300 mg total) by mouth 2 (two) times daily. 180 tablet 3    atorvastatin (LIPITOR) 20 MG tablet Take 1 tablet (20 mg total) by mouth once daily. 90 tablet 3    b  complex vitamins tablet Take 1 tablet by mouth every other day.       calcium citrate/vitamin D2 (ISIAH-CITRATE ORAL) Take 500 mg by mouth 2 (two) times daily.      colchicine (MITIGARE) 0.6 mg Cap Take 1 capsule (0.6 mg total) by mouth daily as needed (flare up). 60 capsule 3    cyanocobalamin (VITAMIN B-12) 1000 MCG tablet Take 100 mcg by mouth every morning.      diclofenac sodium (VOLTAREN) 1 % Gel Apply 2 g topically 4 (four) times daily as needed. To painful area on the feet. 500 g 5    FLUoxetine (PROZAC) 20 mg/5 mL (4 mg/mL) solution Take 5 mLs (20 mg total) by mouth once daily. 450 mL 3    fluticasone propionate (FLONASE) 50 mcg/actuation nasal spray 1 spray (50 mcg total) by Each Nostril route 2 (two) times daily as needed for Rhinitis. 15 g 6    indomethacin (INDOCIN) 50 MG capsule TAKE 1 CAPSULE BY MOUTH 2 TIMES DAILY WITH MEALS 30 capsule 2    LIDOcaine (XYLOCAINE) 5 % Oint ointment Apply topically as needed. 35.44 g 6    MULTIVIT-IRON-MIN-FOLIC ACID 3,500-18-0.4 UNIT-MG-MG ORAL CHEW Take 1 tablet by mouth 2 (two) times daily.       nystatin (MYCOSTATIN) powder Apply topically 2 (two) times daily. 60 g 3    omeprazole (PRILOSEC) 20 MG capsule Take 1 capsule (20 mg total) by mouth every morning. 90 capsule 3    oxyCODONE-acetaminophen (PERCOCET)  mg per tablet Take 1 tablet by mouth every 8 (eight) hours as needed for Pain. 90 tablet 0    vitamin D 185 MG Tab Take 5,000 mg by mouth every morning.       oxybutynin (DITROPAN-XL) 5 MG TR24 Take 1 tablet (5 mg total) by mouth once daily. 90 tablet 3     No current facility-administered medications for this visit.     Facility-Administered Medications Ordered in Other Visits   Medication Dose Route Frequency Provider Last Rate Last Admin    0.9%  NaCl infusion   Intravenous Continuous Lina Wagner MD 25 mL/hr at 12/15/20 1506 25 mL/hr at 12/15/20 1506       Family History   Problem Relation Age of Onset    Diabetes Mother     Cataracts Mother      "Diabetes Father     Cataracts Father     Hypertension Sister     Diabetes Sister     No Known Problems Sister     Cancer Sister         lymphoma     Coronary artery disease Brother     Diabetes Brother     Diabetes Brother     Hypotension Brother     Kidney failure Brother     Hypotension Brother     Amblyopia Neg Hx     Blindness Neg Hx     Glaucoma Neg Hx     Macular degeneration Neg Hx     Retinal detachment Neg Hx     Strabismus Neg Hx     Stroke Neg Hx     Thyroid disease Neg Hx     Anesthesia problems Neg Hx        Social History     Socioeconomic History    Marital status:    Tobacco Use    Smoking status: Never    Smokeless tobacco: Never   Substance and Sexual Activity    Alcohol use: Yes     Comment: occasionally     Drug use: No    Sexual activity: Yes     Partners: Female     Birth control/protection: None   Social History Narrative    Disabled. The patient is the youngest of 6 siblings. Single. Lives with single-sex partner.                        ROS        Objective:   /88   Pulse 60   Temp 97.4 °F (36.3 °C)   Resp 18   Ht 5' 5" (1.651 m)   Wt 110 kg (242 lb 8.1 oz)   LMP 06/26/2018   BMI 40.36 kg/m²   Pain Disability Index Review:  Last 3 PDI Scores 10/5/2022 4/19/2022 8/3/2021   Pain Disability Index (PDI) 48 25 51     Normocephalic.  Atraumatic.  Affect appropriate.  Breathing unlabored.  Extra ocular muscles intact.           Ortho/SPM Exam    No upper tract signs.  Lumbar flexion precipitated pain with radiation down to both lower extremities.  Hyperextension facet loading precipitated pain but less intense than lumbar flexion.  Straight leg raise positive bilaterally.  Internal and external rotation of the hips precipitated knee pain on the right side.  Trace detectable edema bilaterally in the lower extremities.  Antalgic gait.  Assessment:       Encounter Diagnoses   Name Primary?    Osteoarthritis of spine with radiculopathy, lumbar region Yes    Intractable back pain     " Primary osteoarthritis of both knees          Plan:   We discussed with the patient the assessment and recommendations. The following is the plan we agreed on:  1. Physical therapy.    2. Lumbar spine x-ray with flexion-extension views.    3. Return as needed.  Otherwise follow-up in 6 weeks.  Should her symptomatology persists consider MRI of the lumbar spine to evaluate the radicular symptoms and consider epidural steroid injection either bilateral transforaminal or an interlaminar.      Alivia was seen today for low-back pain and back pain.    Diagnoses and all orders for this visit:    Osteoarthritis of spine with radiculopathy, lumbar region  -     Ambulatory referral/consult to Physical/Occupational Therapy; Future  -     X-Ray Lumbar Complete Including Flex And Ext; Future    Intractable back pain  -     X-Ray Lumbar Complete Including Flex And Ext; Future    Primary osteoarthritis of both knees  -     Ambulatory referral/consult to Physical/Occupational Therapy; Future

## 2022-10-05 NOTE — TELEPHONE ENCOUNTER
----- Message from Nazia Gibson sent at 10/5/2022  8:39 AM CDT -----  Regarding: FW: Sooner Appt  Contact: Patient  Called pt. She wanted to be seen in [facility] only asap. Offered pt another  and rosalia, pt declined  please schedule pt next available. Had to miss 7/22 appt due to a sx.  thanks  ----- Message -----  From: Lizeth Tran  Sent: 10/5/2022   8:27 AM CDT  To: [place]  Subject: Sooner Appt                                      Type:  Sooner Apoointment Request    Caller is requesting a sooner appointment.  Caller declined first available appointment listed below.  Caller will not accept being placed on the waitlist and is requesting a message be sent to doctor.  Name of Caller: Patient   When is the first available appointment? 03/07/2023   Symptoms: routine eye exam  Would the patient rather a call back or a response via [facility]? Call back   Best Call Back Number:[phone]  Additional Information: Patient missed yearly appt scheduled for 07/11/2022 Patient was having a procedure done and would like to schedule yearly exam as soon as possible

## 2022-10-06 ENCOUNTER — PATIENT MESSAGE (OUTPATIENT)
Dept: BARIATRICS | Facility: CLINIC | Age: 57
End: 2022-10-06
Payer: MEDICARE

## 2022-10-06 ENCOUNTER — HOSPITAL ENCOUNTER (OUTPATIENT)
Dept: RADIOLOGY | Facility: HOSPITAL | Age: 57
Discharge: HOME | End: 2022-10-06
Attending: ANESTHESIOLOGY
Payer: MEDICARE

## 2022-10-06 PROCEDURE — 72114 X-RAY EXAM L-S SPINE BENDING: CPT | Mod: 26,,, | Performed by: RADIOLOGY

## 2022-10-06 PROCEDURE — 72114 XR LUMBAR SPINE 5 VIEW WITH FLEX AND EXT: ICD-10-PCS | Mod: 26,,, | Performed by: RADIOLOGY

## 2022-10-06 PROCEDURE — 72114 X-RAY EXAM L-S SPINE BENDING: CPT | Mod: TC

## 2022-10-31 ENCOUNTER — OFFICE VISIT (OUTPATIENT)
Dept: PODIATRY | Facility: CLINIC | Age: 57
End: 2022-10-31
Payer: MEDICARE

## 2022-10-31 VITALS
BODY MASS INDEX: 40.32 KG/M2 | HEIGHT: 65 IN | HEART RATE: 65 BPM | WEIGHT: 242 LBS | DIASTOLIC BLOOD PRESSURE: 90 MMHG | SYSTOLIC BLOOD PRESSURE: 159 MMHG

## 2022-10-31 DIAGNOSIS — E11.49 TYPE II DIABETES MELLITUS WITH NEUROLOGICAL MANIFESTATIONS: Primary | ICD-10-CM

## 2022-10-31 DIAGNOSIS — B35.1 DERMATOPHYTOSIS OF NAIL: ICD-10-CM

## 2022-10-31 DIAGNOSIS — L84 CORNS AND CALLUS: ICD-10-CM

## 2022-10-31 DIAGNOSIS — E11.9 ENCOUNTER FOR DIABETIC FOOT EXAM: ICD-10-CM

## 2022-10-31 PROCEDURE — 99214 OFFICE O/P EST MOD 30 MIN: CPT | Mod: PBBFAC,PN | Performed by: PODIATRIST

## 2022-10-31 PROCEDURE — 99499 NO LOS: ICD-10-PCS | Mod: S$PBB,,, | Performed by: PODIATRIST

## 2022-10-31 PROCEDURE — 99999 PR PBB SHADOW E&M-EST. PATIENT-LVL IV: CPT | Mod: PBBFAC,,, | Performed by: PODIATRIST

## 2022-10-31 PROCEDURE — 11721 DEBRIDE NAIL 6 OR MORE: CPT | Mod: Q9,PBBFAC,PN | Performed by: PODIATRIST

## 2022-10-31 PROCEDURE — 11056 ROUTINE FOOT CARE: ICD-10-PCS | Mod: Q9,S$PBB,, | Performed by: PODIATRIST

## 2022-10-31 PROCEDURE — 99999 PR PBB SHADOW E&M-EST. PATIENT-LVL IV: ICD-10-PCS | Mod: PBBFAC,,, | Performed by: PODIATRIST

## 2022-10-31 PROCEDURE — 11721 ROUTINE FOOT CARE: ICD-10-PCS | Mod: Q9,59,S$PBB, | Performed by: PODIATRIST

## 2022-10-31 PROCEDURE — 11056 PARNG/CUTG B9 HYPRKR LES 2-4: CPT | Mod: Q9,S$PBB,, | Performed by: PODIATRIST

## 2022-10-31 PROCEDURE — 99499 UNLISTED E&M SERVICE: CPT | Mod: S$PBB,,, | Performed by: PODIATRIST

## 2022-10-31 NOTE — PROGRESS NOTES
Chief Complaint   Patient presents with    Diabetic Foot Exam     Foot Exam/PCP Clarisse Richardson MD 05/09/22           HPI:   The patient is a 56 y.o.  female  who presents for a diabetic foot exam/care   Patient reports + presence of abnormal sensation to the feet, just sometimes, mostly at night.   Patient has no history of wounds on the feet.   Hx of foot surgery: none.     Shoe gear: casual shoes  This patient has documented high risk feet requiring routine maintenance secondary to diabetes.     Main concern today is need for toenail trimming. Routine trimming helps with toe pain.   She is using lidocaine topical for heel pain.         Primary care doctor is: Clarisse Richardson MD  Patient last saw primary care doctor on:  5/9/2022        Patient Active Problem List   Diagnosis    Morbid obesity    PADDY (obstructive sleep apnea)    Type 2 diabetes mellitus without complication, without long-term current use of insulin    Nuclear sclerosis - Both Eyes    Hyperlipemia    Proteinuria    Type 2 diabetes mellitus without retinopathy - Both Eyes    Bilateral knee pain    DJD (degenerative joint disease) of knee    Debility    Prosthetic joint implant failure    Failed total knee arthroplasty    Right knee pain    Knee pain    History of total left knee replacement    Lumbago    CTS (carpal tunnel syndrome)    Right carpal tunnel syndrome    Chronic, continuous use of opioids    Mild intermittent asthma without complication    Left arm pain    Left shoulder pain    Allergic reaction to food    DDD (degenerative disc disease), cervical    Cervical radiculopathy    Cervical spondylosis    Cubital tunnel syndrome on right    Bilateral shoulder bursitis    Cervical stenosis of spinal canal    DDD (degenerative disc disease), thoracic    Thoracic spondylosis    Spondylolisthesis of lumbar region    Lumbar spondylosis    Spondylolisthesis of cervical region    Cervical spine instability    Myeloradiculopathy    Neural  foraminal stenosis of cervical spine    DDD (degenerative disc disease), lumbar    Idiopathic scoliosis of thoracolumbar spine    Bilateral shoulder region arthritis    Impingement syndrome of right shoulder    Trochanteric bursitis of both hips    Chronic pain    Depression    Recurrent major depressive disorder, in partial remission    GERD (gastroesophageal reflux disease)    Trigger finger    Dyspnea    BMI 45.0-49.9, adult    Sacroiliac joint pain    Spondylosis of lumbosacral region without myelopathy or radiculopathy    Subacromial bursitis of left shoulder joint    Sacroiliitis    Snoring    BMI 39.0-39.9,adult    S/P panniculectomy    Gout    History of sleeve gastrectomy    History of total right knee replacement    Noncompliance with CPAP treatment           Current Outpatient Medications on File Prior to Visit   Medication Sig Dispense Refill    albuterol (VENTOLIN HFA) 90 mcg/actuation inhaler INHALE TWO PUFFS INTO LUNGS EVERY 4 TO 6 HOURS AS NEEDED FOR SHORTNESS OF BREATH AND FOR WHEEZING 18 g 1    allopurinoL (ZYLOPRIM) 300 MG tablet Take 1 tablet (300 mg total) by mouth 2 (two) times daily. 180 tablet 3    atorvastatin (LIPITOR) 20 MG tablet Take 1 tablet (20 mg total) by mouth once daily. 90 tablet 3    b complex vitamins tablet Take 1 tablet by mouth every other day.       calcium citrate/vitamin D2 (ISIAH-CITRATE ORAL) Take 500 mg by mouth 2 (two) times daily.      colchicine (MITIGARE) 0.6 mg Cap Take 1 capsule (0.6 mg total) by mouth daily as needed (flare up). 60 capsule 3    cyanocobalamin (VITAMIN B-12) 1000 MCG tablet Take 100 mcg by mouth every morning.      diclofenac sodium (VOLTAREN) 1 % Gel Apply 2 g topically 4 (four) times daily as needed. To painful area on the feet. 500 g 5    FLUoxetine (PROZAC) 20 mg/5 mL (4 mg/mL) solution Take 5 mLs (20 mg total) by mouth once daily. 450 mL 3    fluticasone propionate (FLONASE) 50 mcg/actuation nasal spray 1 spray (50 mcg total) by Each Nostril  route 2 (two) times daily as needed for Rhinitis. 15 g 6    indomethacin (INDOCIN) 50 MG capsule TAKE 1 CAPSULE BY MOUTH 2 TIMES DAILY WITH MEALS 30 capsule 2    LIDOcaine (XYLOCAINE) 5 % Oint ointment Apply topically as needed. 35.44 g 6    MULTIVIT-IRON-MIN-FOLIC ACID 3,500-18-0.4 UNIT-MG-MG ORAL CHEW Take 1 tablet by mouth 2 (two) times daily.       nystatin (MYCOSTATIN) powder Apply topically 2 (two) times daily. 60 g 3    omeprazole (PRILOSEC) 20 MG capsule Take 1 capsule (20 mg total) by mouth every morning. 90 capsule 3    oxyCODONE-acetaminophen (PERCOCET)  mg per tablet Take 1 tablet by mouth every 8 (eight) hours as needed for Pain. 90 tablet 0    vitamin D 185 MG Tab Take 5,000 mg by mouth every morning.       oxybutynin (DITROPAN-XL) 5 MG TR24 Take 1 tablet (5 mg total) by mouth once daily. 90 tablet 3     Current Facility-Administered Medications on File Prior to Visit   Medication Dose Route Frequency Provider Last Rate Last Admin    0.9%  NaCl infusion   Intravenous Continuous Lina Wagner MD 25 mL/hr at 12/15/20 1506 25 mL/hr at 12/15/20 1506       Review of patient's allergies indicates:  No Known Allergies                  ROS:  General ROS: negative  Respiratory ROS: no cough, shortness of breath, or wheezing  Cardiovascular ROS: no chest pain or dyspnea on exertion  Musculoskeletal ROS: negative  Neurological ROS:   negative for - gait disturbance, + numbness/tingling, seizures or tremors  Dermatological ROS: + nail changes, dry skin          LAST HbA1c:   Hemoglobin A1C   Date Value Ref Range Status   05/09/2022 5.9 (H) 4.0 - 5.6 % Final     Comment:     ADA Screening Guidelines:  5.7-6.4%  Consistent with prediabetes  >or=6.5%  Consistent with diabetes    High levels of fetal hemoglobin interfere with the HbA1C  assay. Heterozygous hemoglobin variants (HbS, HgC, etc)do  not significantly interfere with this assay.   However, presence of multiple variants may affect accuracy.    "  11/08/2021 6.3 (H) 4.0 - 5.6 % Final     Comment:     ADA Screening Guidelines:  5.7-6.4%  Consistent with prediabetes  >or=6.5%  Consistent with diabetes    High levels of fetal hemoglobin interfere with the HbA1C  assay. Heterozygous hemoglobin variants (HbS, HgC, etc)do  not significantly interfere with this assay.   However, presence of multiple variants may affect accuracy.     05/03/2021 6.5 (H) 4.0 - 5.6 % Final     Comment:     ADA Screening Guidelines:  5.7-6.4%  Consistent with prediabetes  >or=6.5%  Consistent with diabetes    High levels of fetal hemoglobin interfere with the HbA1C  assay. Heterozygous hemoglobin variants (HbS, HgC, etc)do  not significantly interfere with this assay.   However, presence of multiple variants may affect accuracy.           EXAM:   Vitals:    10/31/22 0855   BP: (!) 159/90   Pulse: 65   Weight: 109.8 kg (242 lb)   Height: 5' 5" (1.651 m)       General: Pt. is well-developed, well-nourished, appears stated age, in no acute distress, alert and oriented x 3.      Vascular: Dorsalis pedis and posterior tibial pulses are 2/4 bilaterally. 3 secs capillary refill time and toes are warm to touch.    There is reduced hair growth on the feet.    There is 1+ edema.  no varicosities.      Neurological:  Light touch, proprioception, and sharp/dull sensation are all intact bilaterally. Protective threshold with the Clio-Lindsay monofilament is diminished bilaterally.   +paresthesias      Dermatological:  The skin is thin    The toenails  1-5 L and 1-5 R  are greenish- yellow, long by 5-6mm and thickened by 2-3mm with subungual debris  .   There are no open wounds. There is no ecchymoses.  no erythema noted.   Skin diffusely dry on the soles     Interdigital spaces are intact, no fissures    Skin atrophic, thin, dry and mildly hyperpigmented.       Musculoskeletal:    Muscle strength is 5/5 in all groups bilaterally.    Metatarsophalangeal, subtalar, and ankle range of motion are " intact without crepitus.     Ankle equinus bilateral       Assessment / Plan:      ICD-10-CM ICD-9-CM    1. Encounter for diabetic foot exam  E11.9 250.00       2. Corns and callus  L84 700       3. Dermatophytosis of nail  B35.1 110.1       4. Type II diabetes mellitus with neurological manifestations  E11.49 250.60           I counseled the patient on the patient's conditions, their implications and medical management.  Shoe inspection.     Continue good nutrition and blood sugar control to help prevent podiatric complications of diabetes.   Maintain proper foot hygiene.   Continue wearing proper shoe gear, daily foot inspections, never walking without protective shoe gear, never putting sharp instruments to feet.  Shoe and activity modification as needed for relief.         Routine Foot Care    Date/Time: 10/31/2022 9:00 AM  Performed by: Stacey Rowland DPM  Authorized by: Stacey Rowland DPM     Consent Done?:  Yes (Verbal)  Hyperkeratotic Skin Lesions?: Yes    Number of trimmed lesions:  2  Location(s):  Right Heel and Right Midfoot    Nail Care Type:  Debride  Location(s): All  (Left 1st Toe, Left 3rd Toe, Left 2nd Toe, Left 4th Toe, Left 5th Toe, Right 1st Toe, Right 2nd Toe, Right 3rd Toe, Right 4th Toe and Right 5th Toe)  Patient tolerance:  Patient tolerated the procedure well with no immediate complications     With patient's permission, the toenails mentioned above were reduced and debrided using a nail nipper, removing offending nail and debris.   Utilizing a #15 scalpel, I trimmed the corns and calluses at the above mentioned location.      The patient will continue to monitor the areas daily, inspect the feet, wear protective shoe gear when ambulatory, and moisturizer to maintain skin integrity.

## 2022-11-04 ENCOUNTER — PATIENT MESSAGE (OUTPATIENT)
Dept: BARIATRICS | Facility: CLINIC | Age: 57
End: 2022-11-04
Payer: MEDICARE

## 2022-11-08 ENCOUNTER — IMMUNIZATION (OUTPATIENT)
Dept: PHARMACY | Facility: CLINIC | Age: 57
End: 2022-11-08
Payer: MEDICARE

## 2022-11-09 ENCOUNTER — CLINICAL SUPPORT (OUTPATIENT)
Dept: REHABILITATION | Facility: HOSPITAL | Age: 57
End: 2022-11-09
Attending: ANESTHESIOLOGY
Payer: MEDICARE

## 2022-11-09 DIAGNOSIS — M17.0 PRIMARY OSTEOARTHRITIS OF BOTH KNEES: ICD-10-CM

## 2022-11-09 DIAGNOSIS — M47.816 OSTEOARTHRITIS OF LUMBAR SPINE, UNSPECIFIED SPINAL OSTEOARTHRITIS COMPLICATION STATUS: ICD-10-CM

## 2022-11-09 DIAGNOSIS — M47.26 OSTEOARTHRITIS OF SPINE WITH RADICULOPATHY, LUMBAR REGION: ICD-10-CM

## 2022-11-09 DIAGNOSIS — M10.9 GOUT, UNSPECIFIED CAUSE, UNSPECIFIED CHRONICITY, UNSPECIFIED SITE: ICD-10-CM

## 2022-11-09 PROCEDURE — 97110 THERAPEUTIC EXERCISES: CPT | Mod: PO

## 2022-11-09 PROCEDURE — 97161 PT EVAL LOW COMPLEX 20 MIN: CPT | Mod: PO

## 2022-11-09 RX ORDER — ALLOPURINOL 300 MG/1
300 TABLET ORAL 2 TIMES DAILY
Qty: 180 TABLET | Refills: 3 | Status: SHIPPED | OUTPATIENT
Start: 2022-11-09 | End: 2024-03-19 | Stop reason: SDUPTHER

## 2022-11-09 NOTE — PROGRESS NOTES
"[facility]   Physical Therapy Initial Evaluation     Date: 11/9/2022   Name: Alivia Marie Cyr  Clinic Number: [id number]    Therapy Diagnosis:   Encounter Diagnoses   Name Primary?    Osteoarthritis of spine with radiculopathy, lumbar region     Primary osteoarthritis of both knees     Osteoarthritis of lumbar spine, unspecified spinal osteoarthritis complication status      Physician: Israel Hurtado MD    Physician Orders: PT Eval and Treat   Medical Diagnosis from Referral:   M47.26 (ICD-10-CM) - Osteoarthritis of spine with radiculopathy, lumbar region   M17.0 (ICD-10-CM) - Primary osteoarthritis of both knees     Evaluation Date: 11/9/2022  Authorization Period Expiration: 10/5/2023  Plan of Care Expiration: 1/9/2023  Progress Note Due: 12/01126  Visit # / Visits authorized: 1/ 1   FOTO: 1/3    Precautions: Standard and Diabetes     Time In: 7:40  Time Out: 8:00  Total Appointment Time (timed & untimed codes): 45 minutes      SUBJECTIVE     Date of onset: Pt reported that she began having back pain in 2011 after her second (L) TKA.  She has had several  Pain Management procedures since 2012.  She started having pain into her legs about a month ago.  She reports a "tingling" sensation in to B feet.  Her back pain has also increased in the last month.  Transferring sit to stand is difficult and painful in her back. The first few steps after standing are also painful.      History of current condition - Alivia reports: see above    Falls: none    Imaging, X-Rays:     Prior Therapy: yes  Social History: Pt lives in a single story home with no steps. Pt lives with their partner  Occupation: not working  Prior Level of Function: She has had back and knee pain a long time  Current Level of Function: Back is more painful and leg Sx are new and began after her last pain management procedure    Pain:  Current 7/10, worst 10/10, best 7/10   Location: bilateral back  and knee    Description: " Tingling and pressure  Aggravating Factors: any position for too long and supine to sit and sit to stand.  Sitting ~ 20 min, standing ~ 5min.    Easing Factors: pain medication and change positions and pain management procedures    Patients goals: too lose weight and help manage her pain.     Medical History:   Past Medical History:   Diagnosis Date    Allergy     Anemia     Asthma     Bilateral shoulder bursitis     Cervical stenosis of spine     Chronic pain     DDD (degenerative disc disease), cervical     Depression 1/28/2019    Diabetes mellitus type II     DJD (degenerative joint disease) of knee 6/19/2014    Facet arthritis of lumbar region 12/17/2015    Facet syndrome 12/17/2015    GERD (gastroesophageal reflux disease)     Heartburn     Hyperlipidemia     Hypertension     Morbid obesity     Neuromuscular disorder     PADDY (obstructive sleep apnea)     Proteinuria     Right carpal tunnel syndrome     Sacroiliitis 6/13/2018    Sacroiliitis     Sleep apnea        Surgical History:   Alivia Thomas  has a past surgical history that includes Carpal tunnel release; Carpal tunnel release (1980s); Carpal tunnel release (2012); Knee surgery (3/2010); Knee surgery (6-19-14); Radiofrequency ablation of lumbar medial branch nerve at single level (Left, 6/26/2018); Radiofrequency ablation of lumbar medial branch nerve at single level (Right, 7/10/2018); Esophagogastroduodenoscopy (N/A, 3/27/2019); Radiofrequency ablation (Right, 7/9/2019); Joint replacement (Bilateral); Trigger finger release (Right, 2017); Trigger finger release (Left, 7/29/2019); Laparoscopic sleeve gastrectomy (N/A, 8/28/2019); Radiofrequency ablation (Left, 12/19/2019); Radiofrequency ablation (Right, 12/31/2019); Injection of joint (Bilateral, 6/9/2020); Colonoscopy (N/A, 6/15/2020); Radiofrequency ablation (Right, 10/13/2020); Radiofrequency ablation (Left, 11/3/2020); Radiofrequency ablation (Left, 12/15/2020); Radiofrequency ablation (Right,  12/29/2020); Injection of joint (Bilateral, 5/11/2021); Radiofrequency ablation (Left, 6/29/2021); Radiofrequency ablation (Right, 7/13/2021); Radiofrequency ablation (Right, 8/24/2021); Radiofrequency ablation (Left, 9/11/2021); Radiofrequency ablation (Right, 3/7/2022); Radiofrequency ablation (Left, 3/21/2022); Radiofrequency ablation (Right, 5/9/2022); Radiofrequency ablation (Left, 5/23/2022); and Panniculectomy (N/A, 6/14/2022).    Medications:   Alivia has a current medication list which includes the following prescription(s): albuterol, allopurinol, atorvastatin, b complex vitamins, calcium citrate/vitamin d2, colchicine, cyanocobalamin, diclofenac sodium, fluoxetine, fluticasone propionate, indomethacin, lidocaine, multivit-iron-min-folic acid, nystatin, omeprazole, oxybutynin, oxycodone-acetaminophen, and vitamin d, and the following Facility-Administered Medications: sodium chloride 0.9%.    Allergies:   Review of patient's allergies indicates:   Allergen Reactions    Strawberry Anaphylaxis          OBJECTIVE     Observation: Pt was able to enter the clinic ambulating independently without an assistive device.     Posture:  R side elevated     Lumbar Range of Motion:    % Pain   Flexion 40%   Yes tight on L        Extension 20%   Yes pressure     Left Side Bending 50% Sore on L        Right Side Bending 75% Tight on L        Left rotation   nt nt        Right Rotation   nt nt             Lower Extremity Strength  Right LE  Left LE    Knee extension: 2/5 Knee extension: 3+/5   Knee flexion: 2/5 Knee flexion: 4-/5   Hip flexion: nt Hip flexion: nt   Hip extension:  nt Hip extension: nt   Hip abduction: 2/5 Hip abduction: 2+/5   Hip adduction: 2/5 Hip adduction 2/5   Ankle dorsiflexion: 4/5 Ankle dorsiflexion: 4+/5   Ankle plantarflexion: nt Ankle plantarflexion: nt         Special Tests:  -Repeated Flexion: nt  -Repeated Ext: nt  -Piriformis Test: nt  -Prone Instability Test: nt  -Bridge Test: nt  -OH Squat:  nt      Neuro Dynamic Testing:    Sciatic nerve:      SLR: R = nt     L = nt    Slump:  R = (+)       L = (-)       Femoral Nerve:    Femoral nerve test: nt      Joint Mobility: nt    Palpation: no palpable tenderness    Sensation: intact    Flexibility:    Ely's test: R = nt degrees ; L = nt degrees   Popliteal Angle: R = nt degrees ; L = nt degrees   Pao's test: R = nt ; L = nt   Maximus test: R = nt ; L = nt       Limitation/Restriction for FOTO Lumbar spine Survey    Therapist reviewed FOTO scores for Alivia Thomas on 11/9/2022.   FOTO documents entered into [employer] - see Media section.    Limitation Score: 61%         TREATMENT     Total Treatment time (time-based codes) separate from Evaluation: 15 minutes      Alivia received the treatments listed below:      therapeutic exercises to develop core stabilization for 15 minutes including:  Seated abdominal bracing 10 x 2  Seated gluteal sets 10 x 2  Seated B hip abduction 10 x 2 with TB  Seated B hip adduction 10 x 2 with a ball      PATIENT EDUCATION AND HOME EXERCISES     Education provided:   - yes    Written Home Exercises Provided: yes. Exercises were reviewed and Alivia was able to demonstrate them prior to the end of the session.  Alivia demonstrated good  understanding of the education provided. See EMR under Patient Instructions for exercises provided during therapy sessions.    ASSESSMENT     Alivia is a 56 y.o. female referred to outpatient Physical Therapy with a medical diagnosis of   M47.26 (ICD-10-CM) - Osteoarthritis of spine with radiculopathy, lumbar region   M17.0 (ICD-10-CM) - Primary osteoarthritis of both knees   . Patient presents with low back pain and an inability to tolerate lying supine.  She exhibited poor core muscle control and weakness in her B hip musculature.    Patient prognosis is Good.   Patient will benefit from skilled outpatient Physical Therapy to address the deficits stated above and in the chart below, provide patient  /family education, and to maximize patientt's level of independence.     Plan of care discussed with patient: Yes  Patient's spiritual, cultural and educational needs considered and patient is agreeable to the plan of care and goals as stated below:     Anticipated Barriers for therapy: Scheduling    Medical Necessity is demonstrated by the following  History  Co-morbidities and personal factors that may impact the plan of care Co-morbidities:   See past medical history    Personal Factors:   coping style     low   Examination  Body Structures and Functions, activity limitations and participation restrictions that may impact the plan of care Body Regions:   back  lower extremities  trunk    Body Systems:    ROM  strength    Participation Restrictions:   Laying supine, prolonged sitting or standing    Activity limitations:   Learning and applying knowledge  no deficits    General Tasks and Commands  no deficits    Communication  no deficits    Mobility  lifting and carrying objects    Self care  washing oneself (bathing, drying, washing hands)    Domestic Life  shopping  cooking  doing house work (cleaning house, washing dishes, laundry)    Interactions/Relationships  no deficits    Life Areas  employment    Community and Social Life  community life  recreation and leisure         low   Clinical Presentation evolving clinical presentation with changing clinical characteristics moderate   Decision Making/ Complexity Score: low     Goals:  Short Term Goals: 4 weeks   Pt will be instructed in an exercise program to address functional deficits related to her lower back pain  Pt will report that she is able to sit for >/= 30 minutes prior to the onset of back pain  Pt will report that she is able to stand for >/= 10 minutes prior to the onset of back pain    Long Term Goals: 8 weeks   Pt will be independent in a HEP to assist in managing their low back Sx.   Pt will report that she is able to tolerate sitting for >/= 45  minutes prior to the onset of back pain  Pt will report that she is able to stand for >/= 20 minutes prior to the onset of back pain  Pt will report improved functional ability through an improved score on the FOTO lumbar survey.    PLAN   Plan of care Certification: 11/9/2022 to 1/9/2023.    Outpatient Physical Therapy 2 times weekly for 8 weeks to include the following interventions: Cervical/Lumbar Traction, Electrical Stimulation as indicated, Gait Training, Manual Therapy, Moist Heat/ Ice, Neuromuscular Re-ed, Patient Education, Self Care, Therapeutic Activities, Therapeutic Exercise, and Dry needling .     Beltran Estes, PT      I CERTIFY THE NEED FOR THESE SERVICES FURNISHED UNDER THIS PLAN OF TREATMENT AND WHILE UNDER MY CARE   Physician's comments:     Physician's Signature: ___________________________________________________

## 2022-11-14 ENCOUNTER — HOSPITAL ENCOUNTER (OUTPATIENT)
Dept: RADIOLOGY | Facility: HOSPITAL | Age: 57
Discharge: HOME | End: 2022-11-14
Attending: INTERNAL MEDICINE
Payer: MEDICARE

## 2022-11-14 DIAGNOSIS — Z12.31 SCREENING MAMMOGRAM, ENCOUNTER FOR: ICD-10-CM

## 2022-11-14 PROCEDURE — 77067 MAMMO DIGITAL SCREENING BILAT WITH TOMO: ICD-10-PCS | Mod: 26,,, | Performed by: RADIOLOGY

## 2022-11-14 PROCEDURE — 77067 SCR MAMMO BI INCL CAD: CPT | Mod: TC

## 2022-11-14 PROCEDURE — 77063 BREAST TOMOSYNTHESIS BI: CPT | Mod: 26,,, | Performed by: RADIOLOGY

## 2022-11-14 PROCEDURE — 77067 SCR MAMMO BI INCL CAD: CPT | Mod: 26,,, | Performed by: RADIOLOGY

## 2022-11-14 PROCEDURE — 77063 BREAST TOMOSYNTHESIS BI: CPT | Mod: TC

## 2022-11-14 PROCEDURE — 77063 MAMMO DIGITAL SCREENING BILAT WITH TOMO: ICD-10-PCS | Mod: 26,,, | Performed by: RADIOLOGY

## 2022-11-19 PROBLEM — M47.816 OSTEOARTHRITIS OF LUMBAR SPINE: Status: ACTIVE | Noted: 2022-11-19

## 2022-11-19 NOTE — PLAN OF CARE
"[facility]   Physical Therapy Initial Evaluation     Date: 11/9/2022   Name: Alivia Marie Cyr  Clinic Number: [id number]    Therapy Diagnosis:   Encounter Diagnoses   Name Primary?    Osteoarthritis of spine with radiculopathy, lumbar region     Primary osteoarthritis of both knees     Osteoarthritis of lumbar spine, unspecified spinal osteoarthritis complication status      Physician: Israel Hurtado MD    Physician Orders: PT Eval and Treat   Medical Diagnosis from Referral:   M47.26 (ICD-10-CM) - Osteoarthritis of spine with radiculopathy, lumbar region   M17.0 (ICD-10-CM) - Primary osteoarthritis of both knees     Evaluation Date: 11/9/2022  Authorization Period Expiration: 10/5/2023  Plan of Care Expiration: 1/9/2023  Progress Note Due: 12/79257  Visit # / Visits authorized: 1/ 1   FOTO: 1/3    Precautions: Standard and Diabetes     Time In: 7:40  Time Out: 8:00  Total Appointment Time (timed & untimed codes): 45 minutes      SUBJECTIVE     Date of onset: Pt reported that she began having back pain in 2011 after her second (L) TKA.  She has had several  Pain Management procedures since 2012.  She started having pain into her legs about a month ago.  She reports a "tingling" sensation in to B feet.  Her back pain has also increased in the last month.  Transferring sit to stand is difficult and painful in her back. The first few steps after standing are also painful.      History of current condition - Alivia reports: see above    Falls: none    Imaging, X-Rays:     Prior Therapy: yes  Social History: Pt lives in a single story home with no steps. Pt lives with their partner  Occupation: not working  Prior Level of Function: She has had back and knee pain a long time  Current Level of Function: Back is more painful and leg Sx are new and began after her last pain management procedure    Pain:  Current 7/10, worst 10/10, best 7/10   Location: bilateral back  and knee    Description: " Tingling and pressure  Aggravating Factors: any position for too long and supine to sit and sit to stand.  Sitting ~ 20 min, standing ~ 5min.    Easing Factors: pain medication and change positions and pain management procedures    Patients goals: too lose weight and help manage her pain.     Medical History:   Past Medical History:   Diagnosis Date    Allergy     Anemia     Asthma     Bilateral shoulder bursitis     Cervical stenosis of spine     Chronic pain     DDD (degenerative disc disease), cervical     Depression 1/28/2019    Diabetes mellitus type II     DJD (degenerative joint disease) of knee 6/19/2014    Facet arthritis of lumbar region 12/17/2015    Facet syndrome 12/17/2015    GERD (gastroesophageal reflux disease)     Heartburn     Hyperlipidemia     Hypertension     Morbid obesity     Neuromuscular disorder     PADDY (obstructive sleep apnea)     Proteinuria     Right carpal tunnel syndrome     Sacroiliitis 6/13/2018    Sacroiliitis     Sleep apnea        Surgical History:   Alivia Thomas  has a past surgical history that includes Carpal tunnel release; Carpal tunnel release (1980s); Carpal tunnel release (2012); Knee surgery (3/2010); Knee surgery (6-19-14); Radiofrequency ablation of lumbar medial branch nerve at single level (Left, 6/26/2018); Radiofrequency ablation of lumbar medial branch nerve at single level (Right, 7/10/2018); Esophagogastroduodenoscopy (N/A, 3/27/2019); Radiofrequency ablation (Right, 7/9/2019); Joint replacement (Bilateral); Trigger finger release (Right, 2017); Trigger finger release (Left, 7/29/2019); Laparoscopic sleeve gastrectomy (N/A, 8/28/2019); Radiofrequency ablation (Left, 12/19/2019); Radiofrequency ablation (Right, 12/31/2019); Injection of joint (Bilateral, 6/9/2020); Colonoscopy (N/A, 6/15/2020); Radiofrequency ablation (Right, 10/13/2020); Radiofrequency ablation (Left, 11/3/2020); Radiofrequency ablation (Left, 12/15/2020); Radiofrequency ablation (Right,  12/29/2020); Injection of joint (Bilateral, 5/11/2021); Radiofrequency ablation (Left, 6/29/2021); Radiofrequency ablation (Right, 7/13/2021); Radiofrequency ablation (Right, 8/24/2021); Radiofrequency ablation (Left, 9/11/2021); Radiofrequency ablation (Right, 3/7/2022); Radiofrequency ablation (Left, 3/21/2022); Radiofrequency ablation (Right, 5/9/2022); Radiofrequency ablation (Left, 5/23/2022); and Panniculectomy (N/A, 6/14/2022).    Medications:   Alivia has a current medication list which includes the following prescription(s): albuterol, allopurinol, atorvastatin, b complex vitamins, calcium citrate/vitamin d2, colchicine, cyanocobalamin, diclofenac sodium, fluoxetine, fluticasone propionate, indomethacin, lidocaine, multivit-iron-min-folic acid, nystatin, omeprazole, oxybutynin, oxycodone-acetaminophen, and vitamin d, and the following Facility-Administered Medications: sodium chloride 0.9%.    Allergies:   Review of patient's allergies indicates:   Allergen Reactions    Strawberry Anaphylaxis          OBJECTIVE     Observation: Pt was able to enter the clinic ambulating independently without an assistive device.     Posture:  R side elevated     Lumbar Range of Motion:    % Pain   Flexion 40%   Yes tight on L        Extension 20%   Yes pressure     Left Side Bending 50% Sore on L        Right Side Bending 75% Tight on L        Left rotation   nt nt        Right Rotation   nt nt             Lower Extremity Strength  Right LE  Left LE    Knee extension: 2/5 Knee extension: 3+/5   Knee flexion: 2/5 Knee flexion: 4-/5   Hip flexion: nt Hip flexion: nt   Hip extension:  nt Hip extension: nt   Hip abduction: 2/5 Hip abduction: 2+/5   Hip adduction: 2/5 Hip adduction 2/5   Ankle dorsiflexion: 4/5 Ankle dorsiflexion: 4+/5   Ankle plantarflexion: nt Ankle plantarflexion: nt         Special Tests:  -Repeated Flexion: nt  -Repeated Ext: nt  -Piriformis Test: nt  -Prone Instability Test: nt  -Bridge Test: nt  -OH Squat:  nt      Neuro Dynamic Testing:    Sciatic nerve:      SLR: R = nt     L = nt    Slump:  R = (+)       L = (-)       Femoral Nerve:    Femoral nerve test: nt      Joint Mobility: nt    Palpation: no palpable tenderness    Sensation: intact    Flexibility:    Ely's test: R = nt degrees ; L = nt degrees   Popliteal Angle: R = nt degrees ; L = nt degrees   Pao's test: R = nt ; L = nt   Maximus test: R = nt ; L = nt       Limitation/Restriction for FOTO Lumbar spine Survey    Therapist reviewed FOTO scores for Alivia Thomas on 11/9/2022.   FOTO documents entered into [employer] - see Media section.    Limitation Score: 61%         TREATMENT     Total Treatment time (time-based codes) separate from Evaluation: 15 minutes      Alivia received the treatments listed below:      therapeutic exercises to develop core stabilization for 15 minutes including:  Seated abdominal bracing 10 x 2  Seated gluteal sets 10 x 2  Seated B hip abduction 10 x 2 with TB  Seated B hip adduction 10 x 2 with a ball      PATIENT EDUCATION AND HOME EXERCISES     Education provided:   - yes    Written Home Exercises Provided: yes. Exercises were reviewed and Alivia was able to demonstrate them prior to the end of the session.  Alivia demonstrated good  understanding of the education provided. See EMR under Patient Instructions for exercises provided during therapy sessions.    ASSESSMENT     Alivia is a 56 y.o. female referred to outpatient Physical Therapy with a medical diagnosis of   M47.26 (ICD-10-CM) - Osteoarthritis of spine with radiculopathy, lumbar region   M17.0 (ICD-10-CM) - Primary osteoarthritis of both knees   . Patient presents with low back pain and an inability to tolerate lying supine.  She exhibited poor core muscle control and weakness in her B hip musculature.    Patient prognosis is Good.   Patient will benefit from skilled outpatient Physical Therapy to address the deficits stated above and in the chart below, provide patient  /family education, and to maximize patientt's level of independence.     Plan of care discussed with patient: Yes  Patient's spiritual, cultural and educational needs considered and patient is agreeable to the plan of care and goals as stated below:     Anticipated Barriers for therapy: Scheduling    Medical Necessity is demonstrated by the following  History  Co-morbidities and personal factors that may impact the plan of care Co-morbidities:   See past medical history    Personal Factors:   coping style     low   Examination  Body Structures and Functions, activity limitations and participation restrictions that may impact the plan of care Body Regions:   back  lower extremities  trunk    Body Systems:    ROM  strength    Participation Restrictions:   Laying supine, prolonged sitting or standing    Activity limitations:   Learning and applying knowledge  no deficits    General Tasks and Commands  no deficits    Communication  no deficits    Mobility  lifting and carrying objects    Self care  washing oneself (bathing, drying, washing hands)    Domestic Life  shopping  cooking  doing house work (cleaning house, washing dishes, laundry)    Interactions/Relationships  no deficits    Life Areas  employment    Community and Social Life  community life  recreation and leisure         low   Clinical Presentation evolving clinical presentation with changing clinical characteristics moderate   Decision Making/ Complexity Score: low     Goals:  Short Term Goals: 4 weeks   Pt will be instructed in an exercise program to address functional deficits related to her lower back pain  Pt will report that she is able to sit for >/= 30 minutes prior to the onset of back pain  Pt will report that she is able to stand for >/= 10 minutes prior to the onset of back pain    Long Term Goals: 8 weeks   Pt will be independent in a HEP to assist in managing their low back Sx.   Pt will report that she is able to tolerate sitting for >/= 45  minutes prior to the onset of back pain  Pt will report that she is able to stand for >/= 20 minutes prior to the onset of back pain  Pt will report improved functional ability through an improved score on the FOTO lumbar survey.    PLAN   Plan of care Certification: 11/9/2022 to 1/9/2023.    Outpatient Physical Therapy 2 times weekly for 8 weeks to include the following interventions: Cervical/Lumbar Traction, Electrical Stimulation as indicated, Gait Training, Manual Therapy, Moist Heat/ Ice, Neuromuscular Re-ed, Patient Education, Self Care, Therapeutic Activities, Therapeutic Exercise, and Dry needling.     Beltran Estes, PT      I CERTIFY THE NEED FOR THESE SERVICES FURNISHED UNDER THIS PLAN OF TREATMENT AND WHILE UNDER MY CARE   Physician's comments:     Physician's Signature: ___________________________________________________

## 2022-11-21 ENCOUNTER — TELEPHONE (OUTPATIENT)
Dept: PAIN MEDICINE | Facility: CLINIC | Age: 57
End: 2022-11-21
Payer: MEDICARE

## 2022-11-21 NOTE — TELEPHONE ENCOUNTER
----- Message from Lorena Sim sent at 11/21/2022  3:58 PM CST -----  Regarding: Return Call  Who Called: DONTAE MOON [[id number]]            Who Left Message for Patient: Malinda            Does the patient know what this is regarding? Not sure            Best Call Back Number: [phone]            Additional Information:

## 2022-11-21 NOTE — TELEPHONE ENCOUNTER
----- Message from Lorena Sim sent at 11/21/2022  3:58 PM CST -----  Regarding: Return Call  Who Called: DONTAE MOON [[id number]]            Who Left Message for Patient: Malinda            Does the patient know what this is regarding? Not sure            Best Call Back Number: [phone]            Additional Information:

## 2022-11-22 ENCOUNTER — OFFICE VISIT (OUTPATIENT)
Dept: PAIN MEDICINE | Facility: CLINIC | Age: 57
End: 2022-11-22
Payer: MEDICARE

## 2022-11-22 ENCOUNTER — PATIENT MESSAGE (OUTPATIENT)
Dept: PAIN MEDICINE | Facility: OTHER | Age: 57
End: 2022-11-22
Payer: MEDICARE

## 2022-11-22 VITALS
HEIGHT: 65 IN | WEIGHT: 245.56 LBS | HEART RATE: 61 BPM | BODY MASS INDEX: 40.91 KG/M2 | DIASTOLIC BLOOD PRESSURE: 90 MMHG | SYSTOLIC BLOOD PRESSURE: 148 MMHG

## 2022-11-22 DIAGNOSIS — Z51.81 ENCOUNTER FOR MONITORING OPIOID MAINTENANCE THERAPY: ICD-10-CM

## 2022-11-22 DIAGNOSIS — M54.16 LUMBAR RADICULOPATHY: ICD-10-CM

## 2022-11-22 DIAGNOSIS — Z96.653 STATUS POST TOTAL BILATERAL KNEE REPLACEMENT: ICD-10-CM

## 2022-11-22 DIAGNOSIS — M51.36 DDD (DEGENERATIVE DISC DISEASE), LUMBAR: ICD-10-CM

## 2022-11-22 DIAGNOSIS — G89.29 CHRONIC PAIN OF BOTH KNEES: Primary | ICD-10-CM

## 2022-11-22 DIAGNOSIS — M25.561 CHRONIC PAIN OF BOTH KNEES: Primary | ICD-10-CM

## 2022-11-22 DIAGNOSIS — G89.4 CHRONIC PAIN SYNDROME: ICD-10-CM

## 2022-11-22 DIAGNOSIS — M54.9 INTRACTABLE BACK PAIN: ICD-10-CM

## 2022-11-22 DIAGNOSIS — M47.816 LUMBAR SPONDYLOSIS: ICD-10-CM

## 2022-11-22 DIAGNOSIS — Z79.891 ENCOUNTER FOR MONITORING OPIOID MAINTENANCE THERAPY: ICD-10-CM

## 2022-11-22 DIAGNOSIS — M53.3 SACROILIAC JOINT PAIN: ICD-10-CM

## 2022-11-22 DIAGNOSIS — M25.562 CHRONIC PAIN OF BOTH KNEES: Primary | ICD-10-CM

## 2022-11-22 PROCEDURE — 99214 PR OFFICE/OUTPT VISIT, EST, LEVL IV, 30-39 MIN: ICD-10-PCS | Mod: S$PBB,,, | Performed by: NURSE PRACTITIONER

## 2022-11-22 PROCEDURE — 80326 AMPHETAMINES 5 OR MORE: CPT | Performed by: NURSE PRACTITIONER

## 2022-11-22 PROCEDURE — 99214 OFFICE O/P EST MOD 30 MIN: CPT | Mod: S$PBB,,, | Performed by: NURSE PRACTITIONER

## 2022-11-22 PROCEDURE — 99999 PR PBB SHADOW E&M-EST. PATIENT-LVL IV: CPT | Mod: PBBFAC,,, | Performed by: NURSE PRACTITIONER

## 2022-11-22 PROCEDURE — 99999 PR PBB SHADOW E&M-EST. PATIENT-LVL IV: ICD-10-PCS | Mod: PBBFAC,,, | Performed by: NURSE PRACTITIONER

## 2022-11-22 PROCEDURE — 99214 OFFICE O/P EST MOD 30 MIN: CPT | Mod: PBBFAC | Performed by: NURSE PRACTITIONER

## 2022-11-22 NOTE — PROGRESS NOTES
Chronic patient Established Note (Follow up visit)          SUBJECTIVE:    Interval History 11/22/2022:  The patient returns to clinic today for follow up of low back and knee pain. She continues to report low back pain that radiates into the posterior aspect of both legs to under her feet. Her pain is worse with moving from sitting to standing. She also endorses morning stiffness. She recently started physical therapy. She has completed one session. She continues to report bilateral knee pain. She endorses worsening pain with the cold weather. Her pain is worse with standing and walking. She continues to take Percocet as needed with benefit and without adverse effects. She denies any other health changes. Her pain today is 8/10.    Interval History 10/5/2022:  She returns for follow-up.  Her knees are doing well.  She has some discomfort but not enough to do procedures.  Her main complaint is lumbar spine pain for the past few weeks that is radiating to the dorsal aspect of both lower extremities.  She has no red flag signs/symptoms.  No new bowel or bladder symptomatology.  The pain radiates from the back down to the plantar aspect of both feet.  It is activity related.  Rest helps some but it does not resolve the pain.  She has not been in therapy for a while.  No recent imaging.  No recent weight changes.  Otherwise, no new symptomatology.  She goes regularly to her primary care physician for health maintenance.    Interval History 8/9/2022:  Alivia Thomas presents to the clinic for a follow-up appointment for low back and knee pain. She is s/p right genicular RFA on 5/9/2022 and left genicular RFA on 5/22/2022. She reports 60% relief of her knee pain. She also had panniculectomy on 6/14/2022. She is healing well. She continues to report intermittent low back pain, worse with prolonged activity. She denies any radicular leg pain. Her pain is worse prolonged standing and walking. She continues to perform a  homoe exercise routine. She continues to take Percocet as needed with benefit and without adverse effects. She denies any other health changes. Her pain today is 7/10.    Interval History 4/9/2022:  The patient returns to clinic today for follow-up bilateral L3, 4, 5 RFA last month.  She reports that she is feeling very well following the procedure and reports 60-70% pain relief.  Today, she reports that her bilateral knee pain has continued to worsen, and she would like to go ahead and repeat bilateral genicular RFA as soon as possible.  She denies any new numbness, tingling, weakness, saddle anesthesia or bowel/bladder incontinence.    Interval History 12/28/2021:  The patient returns to clinic today for follow up via virtual visit. She continues to report bilateral knee pain. This pain is worse with prolonged standing and walking. She continues to report low back and buttock pain. She denies any radicular leg pain. She continues to report a home exercise routine. She continues to report benefit with Percocet. She denies any adverse effects. She denies any other health changes.    Pain Disability Index Review:  Last 3 PDI Scores 11/22/2022 10/5/2022 4/19/2022   Pain Disability Index (PDI) 40 48 25       Pain Medications:  Percocet    Opioid Contract: yes     report:  Reviewed and consistent with medication use as prescribed.    Pain Procedures:   steroid inj and visco-supplementation (series of 3) in left knee- not helpful  3/31/14 Bilateral genicular nerve blocks  2/16/16 Bilateral L2,3,4,5 MBB  3/1/16 Bilateral L2,3,4,5 MBB   4/6/16 Left L2,3,4,5 RFA- significant relief  4/20/16 Right L2,3,4,5 RFA- significant relief  10/5/16 Left L2,3,4,5 cooled RFA  10/19/16 Right L2,3,4,5 cooled RFA  4/26/17 Left L2,3,4,5 cooled RFA  5/9/17 Right L2,3,4,5 cooled RFA  12/26/2017- Left L2,3,4,5 cooled RFA  1/9/2018- Right L2,3,4,5 cooled RFA  6/26/18 Left L2,3,4,5 cooled RFA- 60% relief  7/10/18 Right L2,3,4,5 cooled RFA-  60% relief  9/28/18 TPIs- significant relief  12/27/18 Left L2,3,4,5 RFA- 70% relief  1/29/19 Right L2,3,4,5 RFA- 70% relief  6/18/19 Left L2,3,4,5 RFA- 70% relief  7/9/19 Right L2,3,4,5 RFA- 50% relief  12/19/19 Left L2,3,4,5 RFA- 80% relief  12/31/19 Right L2,3,4,5 RFA- 50% relief  3/2/2020- Right genicular nerve block- 70% relief for one month  3/12/20 Left subacromial bursa injection- 90% relief  5/18/2020- Left genicular nerve block- 70%  6/9/2020- Bilateral SI joint injections- 50% relief  10/13/2020- Right genicular RFA- 50% relief   11/3/2020- Left genicular RFA- 50% relief  12/15/2020- Left L2,3,4,5 RFA  12/29/2020- Right L2,3,4,5 RFA  4/26/2021- Left subacromial bursa'  5/11/2021- Bilateral SI joint injections   6/28/2021- Left genicular RFA  7/13/2021- Right genicular RFA  8/24/2021- Right L2,3,4,5 RFA  9/11/2021- Left L2,3,4,5 RFA  3/7/2022- Right L2,3,4,5 RFA  3/21/2022- Left L2,3,4,5 RFA  5/9/2022- Right genicular RFA  5/23/2022- Left genicular RFA    Physical Therapy/Home Exercise: yes    Imaging:   Xray Knee 3/6/2019:  FINDINGS:  Postop bilateral knee replacements.  The the the the irregularity about the left patella with soft tissue calcifications similar.  Small joint effusion.  Some irregularity of the right patella unchanged as well.     IMPRESSION:      Postop bilateral knee replacement with irregularity about the patella as above.    MRI Cervical Spine 4/24/2018:  FINDINGS:  There is reversal of the normal cervical lordosis which could be related to patient positioning versus muscle spasm.     Cervical vertebral body heights are well maintained without evidence of acute fracture or dislocation.  Marrow signal is unremarkable.     Spinal canal contents are unremarkable.  No abnormal masses or collections.     Individual levels as detailed below:     C2-3: No significant spinal canal stenosis or neuroforaminal narrowing.     C3-4: Facet arthropathy.  No significant spinal canal stenosis or  neuroforaminal narrowing.     C4-5: Facet arthropathy.  Small broad-based disc osteophyte complex.  Mild right neuroforaminal narrowing.  Mild spinal canal stenosis.     C5-6: Facet arthropathy.  Uncovertebral joint spurring.  Broad-based posterior disc osteophyte complex.  Mild right neuroforaminal narrowing.  Mild spinal canal stenosis.     C6-7: Facet arthropathy.  No significant spinal canal stenosis or neuroforaminal narrowing.     C7-T1: No significant spinal canal stenosis or neuroforaminal narrowing.     Paraspinal muscles are unremarkable.  Soft tissues are intact.     IMPRESSION:      Mild multilevel cervical spondylosis with mild spinal canal stenosis and mild right neuroforaminal narrowing at C4-5 and C5-6.    Xray Lumbar Spine 9/21/2015:  Results: AP, lateral neutral, lateral flexion , lateral extension, bilateral oblique and spot views.  The alignment of the lumbar spine demonstrates a mild levoscoliosis .  11-mm anterior listhesis of L4 relative to L5 with no translational abnormalities   seen on flexion and extension views.  The vertebral body heights  are well-maintained  , mild disk space narrowing L4-L5 and L5-S1.   Mild anterior and marginal osteophyte formation seen  throughout the lumbar spine  .  The oblique views demonstrate no   definite spondylolysis.  There is facet joint osseous hypertrophy noted at L3-L4 and L4-L5.  IMPRESSION:         The Significant spondylosis of the lumbar spine with grade 1 anterior listhesis L4 relative to L5.  Facet joint osseous hypertrophy L3-L4 and L4-5.    Allergies:   Review of patient's allergies indicates:   Allergen Reactions    Strawberry Anaphylaxis       Current Medications:   Current Outpatient Medications   Medication Sig Dispense Refill    albuterol (VENTOLIN HFA) 90 mcg/actuation inhaler INHALE TWO PUFFS INTO LUNGS EVERY 4 TO 6 HOURS AS NEEDED FOR SHORTNESS OF BREATH AND FOR WHEEZING 18 g 1    allopurinoL (ZYLOPRIM) 300 MG tablet Take 1 tablet (300  mg total) by mouth 2 (two) times daily. 180 tablet 3    atorvastatin (LIPITOR) 20 MG tablet TAKE 1 TABLET BY MOUTH ONCE DAILY. 30 tablet 3    b complex vitamins tablet Take 1 tablet by mouth every other day.       calcium citrate/vitamin D2 (ISIAH-CITRATE ORAL) Take 500 mg by mouth 2 (two) times daily.      colchicine (MITIGARE) 0.6 mg Cap Take 1 capsule (0.6 mg total) by mouth daily as needed (flare up). 60 capsule 3    cyanocobalamin (VITAMIN B-12) 1000 MCG tablet Take 100 mcg by mouth every morning.      diclofenac sodium (VOLTAREN) 1 % Gel Apply 2 g topically 4 (four) times daily as needed. To painful area on the feet. 500 g 5    FLUoxetine (PROZAC) 20 mg/5 mL (4 mg/mL) solution Take 5 mLs (20 mg total) by mouth once daily. 450 mL 3    fluticasone propionate (FLONASE) 50 mcg/actuation nasal spray 1 spray (50 mcg total) by Each Nostril route 2 (two) times daily as needed for Rhinitis. 15 g 6    indomethacin (INDOCIN) 50 MG capsule TAKE 1 CAPSULE BY MOUTH 2 TIMES DAILY WITH MEALS 30 capsule 2    LIDOcaine (XYLOCAINE) 5 % Oint ointment Apply topically as needed. 35.44 g 6    MULTIVIT-IRON-MIN-FOLIC ACID 3,500-18-0.4 UNIT-MG-MG ORAL CHEW Take 1 tablet by mouth 2 (two) times daily.       nystatin (MYCOSTATIN) powder Apply topically 2 (two) times daily. 60 g 3    omeprazole (PRILOSEC) 20 MG capsule TAKE 1 CAPSULE BY MOUTH EVERY MORNING. 30 capsule 3    oxyCODONE-acetaminophen (PERCOCET)  mg per tablet Take 1 tablet by mouth every 8 (eight) hours as needed for Pain. 90 tablet 0    vitamin D 185 MG Tab Take 5,000 mg by mouth every morning.       oxybutynin (DITROPAN-XL) 5 MG TR24 Take 1 tablet (5 mg total) by mouth once daily. 90 tablet 3     No current facility-administered medications for this visit.     Facility-Administered Medications Ordered in Other Visits   Medication Dose Route Frequency Provider Last Rate Last Admin    0.9%  NaCl infusion   Intravenous Continuous Lina Wagner MD 25 mL/hr at 12/15/20  1506 25 mL/hr at 12/15/20 1506       REVIEW OF SYSTEMS:    GENERAL:  No weight loss, malaise or fevers.  HEENT:  Negative for frequent or significant headaches.  NECK:  Negative for lumps, goiter, pain and significant neck swelling.  RESPIRATORY:  Negative for cough, wheezing or shortness of breath.  CARDIOVASCULAR:  Negative for chest pain, leg swelling or palpitations. HTN  GI:  Negative for abdominal discomfort, blood in stools or black stools or change in bowel habits. GERD  MUSCULOSKELETAL:  See HPI.  SKIN:  Negative for lesions, rash, and itching.  PSYCH:  Negative for sleep disturbance, mood disorder and recent psychosocial stressors.  HEMATOLOGY/LYMPHOLOGY:  Negative for prolonged bleeding, bruising easily or swollen nodes.  NEURO:   No history of headaches, syncope, paralysis, seizures or tremors.  ENDO: Diabetes  All other reviewed and negative other than HPI.    Past Medical History:  Past Medical History:   Diagnosis Date    Allergy     Anemia     Asthma     Bilateral shoulder bursitis     Cervical stenosis of spine     Chronic pain     DDD (degenerative disc disease), cervical     Depression 1/28/2019    Diabetes mellitus type II     DJD (degenerative joint disease) of knee 6/19/2014    Facet arthritis of lumbar region 12/17/2015    Facet syndrome 12/17/2015    GERD (gastroesophageal reflux disease)     Heartburn     Hyperlipidemia     Hypertension     Morbid obesity     Neuromuscular disorder     PADDY (obstructive sleep apnea)     Proteinuria     Right carpal tunnel syndrome     Sacroiliitis 6/13/2018    Sacroiliitis     Sleep apnea        Past Surgical History:  Past Surgical History:   Procedure Laterality Date    CARPAL TUNNEL RELEASE      CARPAL TUNNEL RELEASE  1980s    left    CARPAL TUNNEL RELEASE  2012    right    COLONOSCOPY N/A 6/15/2020    Procedure: COLONOSCOPY;  Surgeon: Jc Koo MD;  Location: [facility] ([address] [place] [address]);  Service: Endoscopy;  Laterality: N/A;  COVID screening on 6/13/20 at  [facility]  pt updated on drop off location and no visitor policy-rb    ESOPHAGOGASTRODUODENOSCOPY N/A 3/27/2019    Procedure: EGD (ESOPHAGOGASTRODUODENOSCOPY);  Surgeon: Robbin Bar MD;  Location: [facility] (2ND FLR);  Service: Endoscopy;  Laterality: N/A;  BMI 61.3/2nd floor case/svn    INJECTION OF JOINT Bilateral 6/9/2020    Procedure: INJECTION, JOINT, BILATERAL SI;  Surgeon: Lina Wagner MD;  Location: [facility]IN [facility]GT;  Service: Pain Management;  Laterality: Bilateral;  B/L SI Joint Injection    INJECTION OF JOINT Bilateral 5/11/2021    Procedure: INJECTION, JOINT, SACROILIAC (SI) need consent;  Surgeon: Lina Wagner MD;  Location: [facility];  Service: Pain Management;  Laterality: Bilateral;    JOINT REPLACEMENT Bilateral     with 2 revisions on rt    KNEE SURGERY  3/2010    orthroscope    KNEE SURGERY  6-19-14    left TKR    LAPAROSCOPIC SLEEVE GASTRECTOMY N/A 8/28/2019    Procedure: GASTRECTOMY, SLEEVE, LAPAROSCOPIC with intraop EGD;  Surgeon: Heriberto Clements MD;  Location: [facility] OR 2ND FLR;  Service: General;  Laterality: N/A;    PANNICULECTOMY N/A 6/14/2022    Procedure: PANNICULECTOMY;  Surgeon: Rhett Gray MD;  Location: [facility] OR 2ND FLR;  Service: Plastics;  Laterality: N/A;    RADIOFREQUENCY ABLATION Right 7/9/2019    Procedure: RADIOFREQUENCY ABLATION;  Surgeon: Lina Wagner MD;  [facility];  Service: Pain Management;  Laterality: Right;  RIGHT RFA L2,3,4,5  2 of 2    RADIOFREQUENCY ABLATION Left 12/19/2019    Procedure: RADIOFREQUENCY ABLATION, LEFT L2-L3-L4-L5 MEDIAL BRANCH 1 OF 2;  Surgeon: Lina Wagner MD;  Location: [facility];  Service: Pain Management;  Laterality: Left;    RADIOFREQUENCY ABLATION Right 12/31/2019    Procedure: RADIOFREQUENCY ABLATION, RIGHT L2-L3-L4-L5  2 OF 2;  Surgeon: Lina Wagner MD;  Location: [facility];  Service: Pain Management;  Laterality: Right;    RADIOFREQUENCY ABLATION Right  10/13/2020    Procedure: RADIOFREQUENCY ABLATION, Genicular Cooled 1 of 2;  Surgeon: Lina Wagner MD;  Location: [facility];  Service: Pain Management;  Laterality: Right;    RADIOFREQUENCY ABLATION Left 11/3/2020    Procedure: RADIOFREQUENCY ABLATION, GENICULAR COOLED  2 OF 2;  Surgeon: Lina Wagner MD;  Location: [facility];  Service: Pain Management;  Laterality: Left;    RADIOFREQUENCY ABLATION Left 12/15/2020    Procedure: RADIOFREQUENCY ABLATION LEFT L2,3,4,5 1 of 2;  Surgeon: Lina Wagner MD;  Location: [facility];  Service: Pain Management;  Laterality: Left;    RADIOFREQUENCY ABLATION Right 12/29/2020    Procedure: RADIOFREQUENCY ABLATION RIGHT L2,3,4,5 2 of 2;  Surgeon: Lina Wagner MD;  Location: [facility];  Service: Pain Management;  Laterality: Right;    RADIOFREQUENCY ABLATION Left 6/29/2021    Procedure: RADIOFREQUENCY ABLATION GENICULAR COOLED;  Surgeon: Lina Wagner MD;  Location: [facility]IN [facility]GT;  Service: Pain Management;  Laterality: Left;  1 of 2    RADIOFREQUENCY ABLATION Right 7/13/2021    Procedure: RADIOFREQUENCY ABLATION GENICULAR;  Surgeon: Lina Wagner MD;  Location: [facility];  Service: Pain Management;  Laterality: Right;  2 of 2    RADIOFREQUENCY ABLATION Right 8/24/2021    Procedure: RADIOFREQUENCY ABLATION, L2-L3-L4-L5 MEDIAL BRANCH 1 OF 2;  Surgeon: Lina Wagner MD;  Location: [facility];  Service: Pain Management;  Laterality: Right;    RADIOFREQUENCY ABLATION Left 9/11/2021    Procedure: RADIOFREQUENCY ABLATION, L2-L3-L4-L5 MEDIAL BR ANCH 2 OF 2;  Surgeon: Lina Wagner MD;  Location: [facility];  Service: Pain Management;  Laterality: Left;    RADIOFREQUENCY ABLATION Right 3/7/2022    Procedure: RADIOFREQUENCY ABLATION RIGHT L3,4,5 1 of 2, needs consent;  Surgeon: Israel Hurtado MD;  Location: [facility];  Service: Pain Management;  Laterality: Right;    RADIOFREQUENCY ABLATION Left 3/21/2022    Procedure:  RADIOFREQUENCY ABLATION RIGHT L3,4,5 2 of 2, needs consent;  Surgeon: Israel Hurtado MD;  Location: [facility];  Service: Pain Management;  Laterality: Left;    RADIOFREQUENCY ABLATION Right 5/9/2022    Procedure: RADIOFREQUENCY ABLATION RIGHT GENICULAR COOLED 1 of 2;  Surgeon: Israel Hurtado MD;  Location: [facility];  Service: Pain Management;  Laterality: Right;    RADIOFREQUENCY ABLATION Left 5/23/2022    Procedure: RADIOFREQUENCY ABLATION LEFT GENICULAR COOLED  2 of 2;  Surgeon: Israel Hurtado MD;  Location: [facility];  Service: Pain Management;  Laterality: Left;    RADIOFREQUENCY ABLATION OF LUMBAR MEDIAL BRANCH NERVE AT SINGLE LEVEL Left 6/26/2018    Procedure: RADIOFREQUENCY ABLATION, NERVE, MEDIAL BRANCH, LUMBAR, 1 LEVEL;  Surgeon: Lina Wagner MD;  Location: [facility];  Service: Pain Management;  Laterality: Left;  Left Cooled RFA @ L2,3,4,5  [id number]  with Sedation    1 of 2    RADIOFREQUENCY ABLATION OF LUMBAR MEDIAL BRANCH NERVE AT SINGLE LEVEL Right 7/10/2018    Procedure: RADIOFREQUENCY ABLATION, NERVE, MEDIAL BRANCH, LUMBAR, 1 LEVEL;  Surgeon: Lina Wagner MD;  Location: [facility];  Service: Pain Management;  Laterality: Right;  RIght Cooled RFA @ L2,3,4,5  [id number] with Sedation    2 of 2    TRIGGER FINGER RELEASE Right 2017    TRIGGER FINGER RELEASE Left 7/29/2019    Procedure: RELEASE, TRIGGER FINGER, Left Thumb;  Surgeon: Velma Garcia MD;  Location: [facility] OR;  Service: Orthopedics;  Laterality: Left;  Local w/ MAC       Family History:  Family History   Problem Relation Age of Onset    Diabetes Mother     Cataracts Mother     Diabetes Father     Cataracts Father     Hypertension Sister     Diabetes Sister     No Known Problems Sister     Cancer Sister         lymphoma     Coronary artery disease Brother     Diabetes Brother     Diabetes Brother     Hypotension Brother     Kidney failure Brother     Hypotension Brother     Amblyopia Neg Hx     Blindness Neg  "Hx     Glaucoma Neg Hx     Macular degeneration Neg Hx     Retinal detachment Neg Hx     Strabismus Neg Hx     Stroke Neg Hx     Thyroid disease Neg Hx     Anesthesia problems Neg Hx        Social History:  Social History     Socioeconomic History    Marital status:    Tobacco Use    Smoking status: Never    Smokeless tobacco: Never   Substance and Sexual Activity    Alcohol use: Yes     Comment: occasionally     Drug use: No    Sexual activity: Yes     Partners: Female     Birth control/protection: None   Social History Narrative    Disabled. The patient is the youngest of 6 siblings. Single. Lives with single-sex partner.                    OBJECTIVE:    BP (!) 148/90 (BP Location: Left arm, Patient Position: Sitting, BP Method: Large (Automatic))   Pulse 61   Ht 5' 5" (1.651 m)   Wt 111.4 kg (245 lb 9.5 oz)   LMP 06/26/2018   BMI 40.87 kg/m²     PHYSICAL EXAMINATION:    GENERAL: Well appearing, in no acute distress, alert and oriented x3.   PSYCH:  Mood and affect appropriate.  SKIN: Skin color, texture, turgor normal, no rashes or lesions.   HEAD/FACE:  Normocephalic, atraumatic.   CV: RRR with palpation of the radial artery.  PULM: No evidence of respiratory difficulty, symmetric chest rise.  BACK: Negative SLR bilaterally. SLR does cause back pain. There is pain with palpation over lumbar facet joints bilaterally. Limited ROM with pain on extension.  Positive facet loading bilaterally  EXTREMITIES: There is pain with palpation to bilateral SI joints.  JENNA is positive bilaterally. Well-healed midline scars to bilateral knees.  There is pain with extension of bilateral knees.    MUSCULOSKELETAL: Bilateral upper and lower extremity strength is normal and symmetric.  No atrophy or tone abnormalities are noted.  NEURO: Bilateral lower extremity coordination and muscle stretch reflexes are physiologic and symmetric.  Plantar response are downgoing. No clonus.  No loss of sensation is noted.  GAIT: " Antalgic- ambulates without assistance.      ASSESSMENT: 56 y.o. year old female with low back and knee pain, consistent with the followin. Chronic pain of both knees  Procedure Order to Pain Management      2. Status post total bilateral knee replacement  Procedure Order to Pain Management      3. Lumbar radiculopathy  Procedure Order to Pain Management      4. Lumbar spondylosis  Procedure Order to Pain Management      5. Intractable back pain  Procedure Order to Pain Management      6. Sacroiliac joint pain  Procedure Order to Pain Management      7. Chronic pain syndrome  Procedure Order to Pain Management      8. DDD (degenerative disc disease), lumbar        9. Encounter for monitoring opioid maintenance therapy  Pain Clinic Drug Screen              PLAN:     - Previous imaging reviewed today.    - Schedule for right then left genicular RFA, one side at a time, two weeks apart.     - We can repeat lumbar RFA as needed.     - If back pain continues, consider updated MRI.     - Continue Percocet 10/325 mg TID PRN. She does not need a refill at this time.      - The patient is here today for a refill of current pain medications and they believe these provide effective pain control and improvements in quality of life.  The patient notes no serious side effects, and feels the benefits outweigh the risks.  The patient was reminded of the pain contract that they signed previously as well as the risks and benefits of the medication including possible death.  The updated [place] [employer] prescription monitoring program was reviewed, and the patient has been found to be compliant with current treatment plan. Medication management provided by Dr. Hurtado.     - Previous UDS reviewed. Repeat UDS done today.     - I have stressed the importance of physical activity and a home exercise plan to help with pain and improve health.    - Continue physical therapy.     - RTC 3 weeks after above procedure.     -  Counseled patient regarding the importance of activity modification and constant sleeping habits.    The above plan and management options were discussed at length with patient. Patient is in agreement with the above and verbalized understanding.    Adeola Robledo  11/22/2022

## 2022-11-22 NOTE — H&P (VIEW-ONLY)
Chronic patient Established Note (Follow up visit)          SUBJECTIVE:    Interval History 11/22/2022:  The patient returns to clinic today for follow up of low back and knee pain. She continues to report low back pain that radiates into the posterior aspect of both legs to under her feet. Her pain is worse with moving from sitting to standing. She also endorses morning stiffness. She recently started physical therapy. She has completed one session. She continues to report bilateral knee pain. She endorses worsening pain with the cold weather. Her pain is worse with standing and walking. She continues to take Percocet as needed with benefit and without adverse effects. She denies any other health changes. Her pain today is 8/10.    Interval History 10/5/2022:  She returns for follow-up.  Her knees are doing well.  She has some discomfort but not enough to do procedures.  Her main complaint is lumbar spine pain for the past few weeks that is radiating to the dorsal aspect of both lower extremities.  She has no red flag signs/symptoms.  No new bowel or bladder symptomatology.  The pain radiates from the back down to the plantar aspect of both feet.  It is activity related.  Rest helps some but it does not resolve the pain.  She has not been in therapy for a while.  No recent imaging.  No recent weight changes.  Otherwise, no new symptomatology.  She goes regularly to her primary care physician for health maintenance.    Interval History 8/9/2022:  Alivia Thomas presents to the clinic for a follow-up appointment for low back and knee pain. She is s/p right genicular RFA on 5/9/2022 and left genicular RFA on 5/22/2022. She reports 60% relief of her knee pain. She also had panniculectomy on 6/14/2022. She is healing well. She continues to report intermittent low back pain, worse with prolonged activity. She denies any radicular leg pain. Her pain is worse prolonged standing and walking. She continues to perform a  homoe exercise routine. She continues to take Percocet as needed with benefit and without adverse effects. She denies any other health changes. Her pain today is 7/10.    Interval History 4/9/2022:  The patient returns to clinic today for follow-up bilateral L3, 4, 5 RFA last month.  She reports that she is feeling very well following the procedure and reports 60-70% pain relief.  Today, she reports that her bilateral knee pain has continued to worsen, and she would like to go ahead and repeat bilateral genicular RFA as soon as possible.  She denies any new numbness, tingling, weakness, saddle anesthesia or bowel/bladder incontinence.    Interval History 12/28/2021:  The patient returns to clinic today for follow up via virtual visit. She continues to report bilateral knee pain. This pain is worse with prolonged standing and walking. She continues to report low back and buttock pain. She denies any radicular leg pain. She continues to report a home exercise routine. She continues to report benefit with Percocet. She denies any adverse effects. She denies any other health changes.    Pain Disability Index Review:  Last 3 PDI Scores 11/22/2022 10/5/2022 4/19/2022   Pain Disability Index (PDI) 40 48 25       Pain Medications:  Percocet    Opioid Contract: yes     report:  Reviewed and consistent with medication use as prescribed.    Pain Procedures:   steroid inj and visco-supplementation (series of 3) in left knee- not helpful  3/31/14 Bilateral genicular nerve blocks  2/16/16 Bilateral L2,3,4,5 MBB  3/1/16 Bilateral L2,3,4,5 MBB   4/6/16 Left L2,3,4,5 RFA- significant relief  4/20/16 Right L2,3,4,5 RFA- significant relief  10/5/16 Left L2,3,4,5 cooled RFA  10/19/16 Right L2,3,4,5 cooled RFA  4/26/17 Left L2,3,4,5 cooled RFA  5/9/17 Right L2,3,4,5 cooled RFA  12/26/2017- Left L2,3,4,5 cooled RFA  1/9/2018- Right L2,3,4,5 cooled RFA  6/26/18 Left L2,3,4,5 cooled RFA- 60% relief  7/10/18 Right L2,3,4,5 cooled RFA-  60% relief  9/28/18 TPIs- significant relief  12/27/18 Left L2,3,4,5 RFA- 70% relief  1/29/19 Right L2,3,4,5 RFA- 70% relief  6/18/19 Left L2,3,4,5 RFA- 70% relief  7/9/19 Right L2,3,4,5 RFA- 50% relief  12/19/19 Left L2,3,4,5 RFA- 80% relief  12/31/19 Right L2,3,4,5 RFA- 50% relief  3/2/2020- Right genicular nerve block- 70% relief for one month  3/12/20 Left subacromial bursa injection- 90% relief  5/18/2020- Left genicular nerve block- 70%  6/9/2020- Bilateral SI joint injections- 50% relief  10/13/2020- Right genicular RFA- 50% relief   11/3/2020- Left genicular RFA- 50% relief  12/15/2020- Left L2,3,4,5 RFA  12/29/2020- Right L2,3,4,5 RFA  4/26/2021- Left subacromial bursa'  5/11/2021- Bilateral SI joint injections   6/28/2021- Left genicular RFA  7/13/2021- Right genicular RFA  8/24/2021- Right L2,3,4,5 RFA  9/11/2021- Left L2,3,4,5 RFA  3/7/2022- Right L2,3,4,5 RFA  3/21/2022- Left L2,3,4,5 RFA  5/9/2022- Right genicular RFA  5/23/2022- Left genicular RFA    Physical Therapy/Home Exercise: yes    Imaging:   Xray Knee 3/6/2019:  FINDINGS:  Postop bilateral knee replacements.  The the the the irregularity about the left patella with soft tissue calcifications similar.  Small joint effusion.  Some irregularity of the right patella unchanged as well.     IMPRESSION:      Postop bilateral knee replacement with irregularity about the patella as above.    MRI Cervical Spine 4/24/2018:  FINDINGS:  There is reversal of the normal cervical lordosis which could be related to patient positioning versus muscle spasm.     Cervical vertebral body heights are well maintained without evidence of acute fracture or dislocation.  Marrow signal is unremarkable.     Spinal canal contents are unremarkable.  No abnormal masses or collections.     Individual levels as detailed below:     C2-3: No significant spinal canal stenosis or neuroforaminal narrowing.     C3-4: Facet arthropathy.  No significant spinal canal stenosis or  neuroforaminal narrowing.     C4-5: Facet arthropathy.  Small broad-based disc osteophyte complex.  Mild right neuroforaminal narrowing.  Mild spinal canal stenosis.     C5-6: Facet arthropathy.  Uncovertebral joint spurring.  Broad-based posterior disc osteophyte complex.  Mild right neuroforaminal narrowing.  Mild spinal canal stenosis.     C6-7: Facet arthropathy.  No significant spinal canal stenosis or neuroforaminal narrowing.     C7-T1: No significant spinal canal stenosis or neuroforaminal narrowing.     Paraspinal muscles are unremarkable.  Soft tissues are intact.     IMPRESSION:      Mild multilevel cervical spondylosis with mild spinal canal stenosis and mild right neuroforaminal narrowing at C4-5 and C5-6.    Xray Lumbar Spine 9/21/2015:  Results: AP, lateral neutral, lateral flexion , lateral extension, bilateral oblique and spot views.  The alignment of the lumbar spine demonstrates a mild levoscoliosis .  11-mm anterior listhesis of L4 relative to L5 with no translational abnormalities   seen on flexion and extension views.  The vertebral body heights  are well-maintained  , mild disk space narrowing L4-L5 and L5-S1.   Mild anterior and marginal osteophyte formation seen  throughout the lumbar spine  .  The oblique views demonstrate no   definite spondylolysis.  There is facet joint osseous hypertrophy noted at L3-L4 and L4-L5.  IMPRESSION:         The Significant spondylosis of the lumbar spine with grade 1 anterior listhesis L4 relative to L5.  Facet joint osseous hypertrophy L3-L4 and L4-5.    Allergies:   Review of patient's allergies indicates:   Allergen Reactions    Strawberry Anaphylaxis       Current Medications:   Current Outpatient Medications   Medication Sig Dispense Refill    albuterol (VENTOLIN HFA) 90 mcg/actuation inhaler INHALE TWO PUFFS INTO LUNGS EVERY 4 TO 6 HOURS AS NEEDED FOR SHORTNESS OF BREATH AND FOR WHEEZING 18 g 1    allopurinoL (ZYLOPRIM) 300 MG tablet Take 1 tablet (300  mg total) by mouth 2 (two) times daily. 180 tablet 3    atorvastatin (LIPITOR) 20 MG tablet TAKE 1 TABLET BY MOUTH ONCE DAILY. 30 tablet 3    b complex vitamins tablet Take 1 tablet by mouth every other day.       calcium citrate/vitamin D2 (ISIAH-CITRATE ORAL) Take 500 mg by mouth 2 (two) times daily.      colchicine (MITIGARE) 0.6 mg Cap Take 1 capsule (0.6 mg total) by mouth daily as needed (flare up). 60 capsule 3    cyanocobalamin (VITAMIN B-12) 1000 MCG tablet Take 100 mcg by mouth every morning.      diclofenac sodium (VOLTAREN) 1 % Gel Apply 2 g topically 4 (four) times daily as needed. To painful area on the feet. 500 g 5    FLUoxetine (PROZAC) 20 mg/5 mL (4 mg/mL) solution Take 5 mLs (20 mg total) by mouth once daily. 450 mL 3    fluticasone propionate (FLONASE) 50 mcg/actuation nasal spray 1 spray (50 mcg total) by Each Nostril route 2 (two) times daily as needed for Rhinitis. 15 g 6    indomethacin (INDOCIN) 50 MG capsule TAKE 1 CAPSULE BY MOUTH 2 TIMES DAILY WITH MEALS 30 capsule 2    LIDOcaine (XYLOCAINE) 5 % Oint ointment Apply topically as needed. 35.44 g 6    MULTIVIT-IRON-MIN-FOLIC ACID 3,500-18-0.4 UNIT-MG-MG ORAL CHEW Take 1 tablet by mouth 2 (two) times daily.       nystatin (MYCOSTATIN) powder Apply topically 2 (two) times daily. 60 g 3    omeprazole (PRILOSEC) 20 MG capsule TAKE 1 CAPSULE BY MOUTH EVERY MORNING. 30 capsule 3    oxyCODONE-acetaminophen (PERCOCET)  mg per tablet Take 1 tablet by mouth every 8 (eight) hours as needed for Pain. 90 tablet 0    vitamin D 185 MG Tab Take 5,000 mg by mouth every morning.       oxybutynin (DITROPAN-XL) 5 MG TR24 Take 1 tablet (5 mg total) by mouth once daily. 90 tablet 3     No current facility-administered medications for this visit.     Facility-Administered Medications Ordered in Other Visits   Medication Dose Route Frequency Provider Last Rate Last Admin    0.9%  NaCl infusion   Intravenous Continuous Lina Wagner MD 25 mL/hr at 12/15/20  1506 25 mL/hr at 12/15/20 1506       REVIEW OF SYSTEMS:    GENERAL:  No weight loss, malaise or fevers.  HEENT:  Negative for frequent or significant headaches.  NECK:  Negative for lumps, goiter, pain and significant neck swelling.  RESPIRATORY:  Negative for cough, wheezing or shortness of breath.  CARDIOVASCULAR:  Negative for chest pain, leg swelling or palpitations. HTN  GI:  Negative for abdominal discomfort, blood in stools or black stools or change in bowel habits. GERD  MUSCULOSKELETAL:  See HPI.  SKIN:  Negative for lesions, rash, and itching.  PSYCH:  Negative for sleep disturbance, mood disorder and recent psychosocial stressors.  HEMATOLOGY/LYMPHOLOGY:  Negative for prolonged bleeding, bruising easily or swollen nodes.  NEURO:   No history of headaches, syncope, paralysis, seizures or tremors.  ENDO: Diabetes  All other reviewed and negative other than HPI.    Past Medical History:  Past Medical History:   Diagnosis Date    Allergy     Anemia     Asthma     Bilateral shoulder bursitis     Cervical stenosis of spine     Chronic pain     DDD (degenerative disc disease), cervical     Depression 1/28/2019    Diabetes mellitus type II     DJD (degenerative joint disease) of knee 6/19/2014    Facet arthritis of lumbar region 12/17/2015    Facet syndrome 12/17/2015    GERD (gastroesophageal reflux disease)     Heartburn     Hyperlipidemia     Hypertension     Morbid obesity     Neuromuscular disorder     PADDY (obstructive sleep apnea)     Proteinuria     Right carpal tunnel syndrome     Sacroiliitis 6/13/2018    Sacroiliitis     Sleep apnea        Past Surgical History:  Past Surgical History:   Procedure Laterality Date    CARPAL TUNNEL RELEASE      CARPAL TUNNEL RELEASE  1980s    left    CARPAL TUNNEL RELEASE  2012    right    COLONOSCOPY N/A 6/15/2020    Procedure: COLONOSCOPY;  Surgeon: Jc Koo MD;  Location: [facility] ([address] [place] [address]);  Service: Endoscopy;  Laterality: N/A;  COVID screening on 6/13/20 at  [facility]  pt updated on drop off location and no visitor policy-rb    ESOPHAGOGASTRODUODENOSCOPY N/A 3/27/2019    Procedure: EGD (ESOPHAGOGASTRODUODENOSCOPY);  Surgeon: Robbin Bar MD;  Location: [facility] (2ND FLR);  Service: Endoscopy;  Laterality: N/A;  BMI 61.3/2nd floor case/svn    INJECTION OF JOINT Bilateral 6/9/2020    Procedure: INJECTION, JOINT, BILATERAL SI;  Surgeon: Lina Wagner MD;  Location: [facility];  Service: Pain Management;  Laterality: Bilateral;  B/L SI Joint Injection    INJECTION OF JOINT Bilateral 5/11/2021    Procedure: INJECTION, JOINT, SACROILIAC (SI) need consent;  Surgeon: Lina Wagner MD;  Location: [facility];  Service: Pain Management;  Laterality: Bilateral;    JOINT REPLACEMENT Bilateral     with 2 revisions on rt    KNEE SURGERY  3/2010    orthroscope    KNEE SURGERY  6-19-14    left TKR    LAPAROSCOPIC SLEEVE GASTRECTOMY N/A 8/28/2019    Procedure: GASTRECTOMY, SLEEVE, LAPAROSCOPIC with intraop EGD;  Surgeon: Heriberto Clements MD;  Location: [facility] OR 2ND FLR;  Service: General;  Laterality: N/A;    PANNICULECTOMY N/A 6/14/2022    Procedure: PANNICULECTOMY;  Surgeon: Rhett Gray MD;  Location: [facility] OR 2ND FLR;  Service: Plastics;  Laterality: N/A;    RADIOFREQUENCY ABLATION Right 7/9/2019    Procedure: RADIOFREQUENCY ABLATION;  Surgeon: Lina Wagner MD;  [facility];  Service: Pain Management;  Laterality: Right;  RIGHT RFA L2,3,4,5  2 of 2    RADIOFREQUENCY ABLATION Left 12/19/2019    Procedure: RADIOFREQUENCY ABLATION, LEFT L2-L3-L4-L5 MEDIAL BRANCH 1 OF 2;  Surgeon: Lina Wagner MD;  Location: [facility];  Service: Pain Management;  Laterality: Left;    RADIOFREQUENCY ABLATION Right 12/31/2019    Procedure: RADIOFREQUENCY ABLATION, RIGHT L2-L3-L4-L5  2 OF 2;  Surgeon: Lina Wagner MD;  Location: [facility];  Service: Pain Management;  Laterality: Right;    RADIOFREQUENCY ABLATION Right  10/13/2020    Procedure: RADIOFREQUENCY ABLATION, Genicular Cooled 1 of 2;  Surgeon: Lina Wagner MD;  Location: [facility];  Service: Pain Management;  Laterality: Right;    RADIOFREQUENCY ABLATION Left 11/3/2020    Procedure: RADIOFREQUENCY ABLATION, GENICULAR COOLED  2 OF 2;  Surgeon: Lina Wagner MD;  Location: [facility];  Service: Pain Management;  Laterality: Left;    RADIOFREQUENCY ABLATION Left 12/15/2020    Procedure: RADIOFREQUENCY ABLATION LEFT L2,3,4,5 1 of 2;  Surgeon: Lina Wagner MD;  Location: [facility];  Service: Pain Management;  Laterality: Left;    RADIOFREQUENCY ABLATION Right 12/29/2020    Procedure: RADIOFREQUENCY ABLATION RIGHT L2,3,4,5 2 of 2;  Surgeon: Lina Wagner MD;  Location: [facility];  Service: Pain Management;  Laterality: Right;    RADIOFREQUENCY ABLATION Left 6/29/2021    Procedure: RADIOFREQUENCY ABLATION GENICULAR COOLED;  Surgeon: Lina Wagner MD;  Location: [facility];  Service: Pain Management;  Laterality: Left;  1 of 2    RADIOFREQUENCY ABLATION Right 7/13/2021    Procedure: RADIOFREQUENCY ABLATION GENICULAR;  Surgeon: Lina Wagner MD;  Location: [facility];  Service: Pain Management;  Laterality: Right;  2 of 2    RADIOFREQUENCY ABLATION Right 8/24/2021    Procedure: RADIOFREQUENCY ABLATION, L2-L3-L4-L5 MEDIAL BRANCH 1 OF 2;  Surgeon: Lina Wagner MD;  Location: [facility];  Service: Pain Management;  Laterality: Right;    RADIOFREQUENCY ABLATION Left 9/11/2021    Procedure: RADIOFREQUENCY ABLATION, L2-L3-L4-L5 MEDIAL BR ANCH 2 OF 2;  Surgeon: Lina Wagner MD;  Location: [facility];  Service: Pain Management;  Laterality: Left;    RADIOFREQUENCY ABLATION Right 3/7/2022    Procedure: RADIOFREQUENCY ABLATION RIGHT L3,4,5 1 of 2, needs consent;  Surgeon: Israel Hurtado MD;  Location: [facility];  Service: Pain Management;  Laterality: Right;    RADIOFREQUENCY ABLATION Left 3/21/2022    Procedure:  RADIOFREQUENCY ABLATION RIGHT L3,4,5 2 of 2, needs consent;  Surgeon: Israel Hutrado MD;  Location: [facility];  Service: Pain Management;  Laterality: Left;    RADIOFREQUENCY ABLATION Right 5/9/2022    Procedure: RADIOFREQUENCY ABLATION RIGHT GENICULAR COOLED 1 of 2;  Surgeon: Israel Hurtado MD;  Location: [facility];  Service: Pain Management;  Laterality: Right;    RADIOFREQUENCY ABLATION Left 5/23/2022    Procedure: RADIOFREQUENCY ABLATION LEFT GENICULAR COOLED  2 of 2;  Surgeon: Israel Hurtado MD;  Location: [facility];  Service: Pain Management;  Laterality: Left;    RADIOFREQUENCY ABLATION OF LUMBAR MEDIAL BRANCH NERVE AT SINGLE LEVEL Left 6/26/2018    Procedure: RADIOFREQUENCY ABLATION, NERVE, MEDIAL BRANCH, LUMBAR, 1 LEVEL;  Surgeon: Lina Wagner MD;  Location: [facility];  Service: Pain Management;  Laterality: Left;  Left Cooled RFA @ L2,3,4,5  [id number]  with Sedation    1 of 2    RADIOFREQUENCY ABLATION OF LUMBAR MEDIAL BRANCH NERVE AT SINGLE LEVEL Right 7/10/2018    Procedure: RADIOFREQUENCY ABLATION, NERVE, MEDIAL BRANCH, LUMBAR, 1 LEVEL;  Surgeon: Lina Wagner MD;  Location: [facility];  Service: Pain Management;  Laterality: Right;  RIght Cooled RFA @ L2,3,4,5  [id number] with Sedation    2 of 2    TRIGGER FINGER RELEASE Right 2017    TRIGGER FINGER RELEASE Left 7/29/2019    Procedure: RELEASE, TRIGGER FINGER, Left Thumb;  Surgeon: Velma Garcia MD;  Location: [facility] OR;  Service: Orthopedics;  Laterality: Left;  Local w/ MAC       Family History:  Family History   Problem Relation Age of Onset    Diabetes Mother     Cataracts Mother     Diabetes Father     Cataracts Father     Hypertension Sister     Diabetes Sister     No Known Problems Sister     Cancer Sister         lymphoma     Coronary artery disease Brother     Diabetes Brother     Diabetes Brother     Hypotension Brother     Kidney failure Brother     Hypotension Brother     Amblyopia Neg Hx     Blindness Neg  "Hx     Glaucoma Neg Hx     Macular degeneration Neg Hx     Retinal detachment Neg Hx     Strabismus Neg Hx     Stroke Neg Hx     Thyroid disease Neg Hx     Anesthesia problems Neg Hx        Social History:  Social History     Socioeconomic History    Marital status:    Tobacco Use    Smoking status: Never    Smokeless tobacco: Never   Substance and Sexual Activity    Alcohol use: Yes     Comment: occasionally     Drug use: No    Sexual activity: Yes     Partners: Female     Birth control/protection: None   Social History Narrative    Disabled. The patient is the youngest of 6 siblings. Single. Lives with single-sex partner.                    OBJECTIVE:    BP (!) 148/90 (BP Location: Left arm, Patient Position: Sitting, BP Method: Large (Automatic))   Pulse 61   Ht 5' 5" (1.651 m)   Wt 111.4 kg (245 lb 9.5 oz)   LMP 06/26/2018   BMI 40.87 kg/m²     PHYSICAL EXAMINATION:    GENERAL: Well appearing, in no acute distress, alert and oriented x3.   PSYCH:  Mood and affect appropriate.  SKIN: Skin color, texture, turgor normal, no rashes or lesions.   HEAD/FACE:  Normocephalic, atraumatic.   CV: RRR with palpation of the radial artery.  PULM: No evidence of respiratory difficulty, symmetric chest rise.  BACK: Negative SLR bilaterally. SLR does cause back pain. There is pain with palpation over lumbar facet joints bilaterally. Limited ROM with pain on extension.  Positive facet loading bilaterally  EXTREMITIES: There is pain with palpation to bilateral SI joints.  JENNA is positive bilaterally. Well-healed midline scars to bilateral knees.  There is pain with extension of bilateral knees.    MUSCULOSKELETAL: Bilateral upper and lower extremity strength is normal and symmetric.  No atrophy or tone abnormalities are noted.  NEURO: Bilateral lower extremity coordination and muscle stretch reflexes are physiologic and symmetric.  Plantar response are downgoing. No clonus.  No loss of sensation is noted.  GAIT: " Antalgic- ambulates without assistance.      ASSESSMENT: 56 y.o. year old female with low back and knee pain, consistent with the followin. Chronic pain of both knees  Procedure Order to Pain Management      2. Status post total bilateral knee replacement  Procedure Order to Pain Management      3. Lumbar radiculopathy  Procedure Order to Pain Management      4. Lumbar spondylosis  Procedure Order to Pain Management      5. Intractable back pain  Procedure Order to Pain Management      6. Sacroiliac joint pain  Procedure Order to Pain Management      7. Chronic pain syndrome  Procedure Order to Pain Management      8. DDD (degenerative disc disease), lumbar        9. Encounter for monitoring opioid maintenance therapy  Pain Clinic Drug Screen              PLAN:     - Previous imaging reviewed today.    - Schedule for right then left genicular RFA, one side at a time, two weeks apart.     - We can repeat lumbar RFA as needed.     - If back pain continues, consider updated MRI.     - Continue Percocet 10/325 mg TID PRN. She does not need a refill at this time.      - The patient is here today for a refill of current pain medications and they believe these provide effective pain control and improvements in quality of life.  The patient notes no serious side effects, and feels the benefits outweigh the risks.  The patient was reminded of the pain contract that they signed previously as well as the risks and benefits of the medication including possible death.  The updated [place] [employer] prescription monitoring program was reviewed, and the patient has been found to be compliant with current treatment plan. Medication management provided by Dr. Hurtado.     - Previous UDS reviewed. Repeat UDS done today.     - I have stressed the importance of physical activity and a home exercise plan to help with pain and improve health.    - Continue physical therapy.     - RTC 3 weeks after above procedure.     -  Counseled patient regarding the importance of activity modification and constant sleeping habits.    The above plan and management options were discussed at length with patient. Patient is in agreement with the above and verbalized understanding.    Adeola Robledo  11/22/2022

## 2022-11-27 LAB
6MAM UR QL: NOT DETECTED
7AMINOCLONAZEPAM UR QL: NOT DETECTED
A-OH ALPRAZ UR QL: NOT DETECTED
ALPHA-OH-MIDAZOLAM: NOT DETECTED
ALPRAZ UR QL: NOT DETECTED
AMPHET UR QL SCN: NOT DETECTED
ANNOTATION COMMENT IMP: NORMAL
ANNOTATION COMMENT IMP: NORMAL
BARBITURATES UR QL: NOT DETECTED
BUPRENORPHINE UR QL: NOT DETECTED
BZE UR QL: NOT DETECTED
CARBOXYTHC UR QL: NOT DETECTED
CARISOPRODOL UR QL: NOT DETECTED
CLONAZEPAM UR QL: NOT DETECTED
CODEINE UR QL: NOT DETECTED
CREAT UR-MCNC: 112.3 MG/DL (ref 20–400)
DIAZEPAM UR QL: NOT DETECTED
ETHYL GLUCURONIDE UR QL: NOT DETECTED
FENTANYL UR QL: NOT DETECTED
GABAPENTIN: NOT DETECTED
HYDROCODONE UR QL: NOT DETECTED
HYDROMORPHONE UR QL: NOT DETECTED
LORAZEPAM UR QL: NOT DETECTED
MDA UR QL: NOT DETECTED
MDEA UR QL: NOT DETECTED
MDMA UR QL: NOT DETECTED
ME-PHENIDATE UR QL: NOT DETECTED
METHADONE UR QL: NOT DETECTED
METHAMPHET UR QL: NOT DETECTED
MIDAZOLAM UR QL SCN: NOT DETECTED
MORPHINE UR QL: NOT DETECTED
NALOXONE: NOT DETECTED
NORBUPRENORPHINE UR QL CFM: NOT DETECTED
NORDIAZEPAM UR QL: NOT DETECTED
NORFENTANYL UR QL: NOT DETECTED
NORHYDROCODONE UR QL CFM: NOT DETECTED
NORMEPERIDINE UR QL CFM: NOT DETECTED
NOROXYCODONE UR QL CFM: PRESENT
NOROXYMORPHONE UR QL SCN: NOT DETECTED
OXAZEPAM UR QL: NOT DETECTED
OXYCODONE UR QL: PRESENT
OXYMORPHONE UR QL: PRESENT
PATHOLOGY STUDY: NORMAL
PCP UR QL: NOT DETECTED
PHENTERMINE UR QL: NOT DETECTED
PREGABALIN: NOT DETECTED
SERVICE CMNT-IMP: NORMAL
TAPENTADOL UR QL SCN: NOT DETECTED
TAPENTADOL UR QL SCN: NOT DETECTED
TEMAZEPAM UR QL: NOT DETECTED
TRAMADOL UR QL: NOT DETECTED
ZOLPIDEM METABOLITE: NOT DETECTED
ZOLPIDEM UR QL: NOT DETECTED

## 2022-12-06 NOTE — PROGRESS NOTES
[facility]   Physical Therapy Treatment Note     Name: Alivia Marie Cyr  Clinic Number: [id number]    Therapy Diagnosis:   Encounter Diagnosis   Name Primary?    Osteoarthritis of lumbar spine, unspecified spinal osteoarthritis complication status Yes     Physician: Israle Hurtado MD    Visit Date: 12/7/2022    Physician Orders: PT Eval and Treat   Medical Diagnosis from Referral:   M47.26 (ICD-10-CM) - Osteoarthritis of spine with radiculopathy, lumbar region   M17.0 (ICD-10-CM) - Primary osteoarthritis of both knees      Evaluation Date: 11/9/2022  Authorization Period Expiration: 10/5/2023  Plan of Care Expiration: 1/9/2023  Progress Note Due: 12/92022  Visit # / Visits authorized: 1/ 20  FOTO: 1/3     Precautions: Standard and Diabetes     PTA Visit #: 1/5     Time In: 7:15 am  Time Out: 7:58 am  Total Billable Time: 43 minutes    SUBJECTIVE     Pt reports: her knees are bothering her the most today. She is having the radiofrequency ablation to her right knee next week and to her left knee next month  She was compliant with home exercise program.  Response to previous treatment: initial evaluation  Functional change: ongoing    Pain: 7/10  Location: bilateral knees, low back    OBJECTIVE     Objective Measures updated at progress report unless specified.     Treatment     Alivia received the treatments listed below:      therapeutic exercises to develop core stabilization for 43 minutes including:  Nu step 5'  Seated abdominal bracing 10 x 2  Seated gluteal sets 10 x 2  Seated B hip abduction 10 x 2 with green TB  Seated B hip adduction 10 x 2 with a ball  Seated marching 10 x 2 (cueing for core engagement)  Seated LAQ 10 x 2  Seated heel/toe raises 10 x 2  Sit to stand from high low table (elevated) x10        Patient Education and Home Exercises     Home Exercises Provided and Patient Education Provided     Education provided:   - HEP    Written Home Exercises Provided: Patient  instructed to cont prior HEP. Exercises were reviewed and Alivia was able to demonstrate them prior to the end of the session.  Alivia demonstrated good  understanding of the education provided. See EMR under Patient Instructions for exercises provided during therapy sessions    ASSESSMENT     Alivia presents to treatment ambulating without assistive device with antalgic gait. She reports moderate pain in bilateral knees with minimal pain in her low back. She reported a decrease in pain in her back while on the nu step, but continued pain in her knees. All exercises were performed in sitting due to increased back pain in supine. Fair tolerance exercises due to pain. Continue to progress as tolerated.     Alivia Is progressing well towards her goals.   Pt prognosis is Good.     Pt will continue to benefit from skilled outpatient physical therapy to address the deficits listed in the problem list box on initial evaluation, provide pt/family education and to maximize pt's level of independence in the home and community environment.     Pt's spiritual, cultural and educational needs considered and pt agreeable to plan of care and goals.     Anticipated Barriers for therapy: Scheduling     Goals:  Short Term Goals: 4 weeks   Pt will be instructed in an exercise program to address functional deficits related to her lower back pain  Pt will report that she is able to sit for >/= 30 minutes prior to the onset of back pain  Pt will report that she is able to stand for >/= 10 minutes prior to the onset of back pain     Long Term Goals: 8 weeks   Pt will be independent in a HEP to assist in managing their low back Sx.   Pt will report that she is able to tolerate sitting for >/= 45 minutes prior to the onset of back pain  Pt will report that she is able to stand for >/= 20 minutes prior to the onset of back pain  Pt will report improved functional ability through an improved score on the FOTO lumbar survey.    PLAN     Continue to  progress per PT POC    Richelle Mendez, PTA

## 2022-12-07 ENCOUNTER — PATIENT MESSAGE (OUTPATIENT)
Dept: BARIATRICS | Facility: CLINIC | Age: 57
End: 2022-12-07
Payer: MEDICARE

## 2022-12-07 ENCOUNTER — CLINICAL SUPPORT (OUTPATIENT)
Dept: REHABILITATION | Facility: HOSPITAL | Age: 57
End: 2022-12-07
Attending: INTERNAL MEDICINE
Payer: MEDICARE

## 2022-12-07 DIAGNOSIS — M47.816 OSTEOARTHRITIS OF LUMBAR SPINE, UNSPECIFIED SPINAL OSTEOARTHRITIS COMPLICATION STATUS: Primary | ICD-10-CM

## 2022-12-07 PROCEDURE — 97110 THERAPEUTIC EXERCISES: CPT | Mod: PO,CQ

## 2022-12-08 NOTE — PROGRESS NOTES
[facility]   Physical Therapy Treatment Note     Name: Alivia Marie Cyr  Clinic Number: [id number]    Therapy Diagnosis:   Encounter Diagnosis   Name Primary?    Osteoarthritis of lumbar spine, unspecified spinal osteoarthritis complication status Yes     Physician: Israel Hurtado MD    Visit Date: 12/9/2022    Physician Orders: PT Eval and Treat   Medical Diagnosis from Referral:   M47.26 (ICD-10-CM) - Osteoarthritis of spine with radiculopathy, lumbar region   M17.0 (ICD-10-CM) - Primary osteoarthritis of both knees      Evaluation Date: 11/9/2022  Authorization Period Expiration: 10/5/2023  Plan of Care Expiration: 1/9/2023  Progress Note Due: 12/92022  Visit # / Visits authorized: 1/ 20  FOTO: 1/3     Precautions: Standard and Diabetes     PTA Visit #: 1/5     Time In: 7:04 am  Time Out: 7:42 am  Total Billable Time: 38 minutes    SUBJECTIVE     Pt reports: her knees and back are sore this morning.  She stated that laying in bed is comfortable than sitting in the recliner, but it is easier getting out the recliner than it is getting out of bed.  She was compliant with home exercise program.  Response to previous treatment: initial evaluation  Functional change: ongoing    Pain: 7/10  Location: bilateral knees, low back    OBJECTIVE     Objective Measures updated at progress report unless specified.     Treatment     Alivia received the treatments listed below:      therapeutic exercises to develop core stabilization for 43 minutes including:  Nu step 5'  Seated abdominal bracing 10 x 2  Seated gluteal sets 10 x 2  Seated B hip abduction 10 x 2 with green TB  Seated B hip adduction 10 x 2 with a ball  Seated marching 10 x 2 (cueing for core engagement)  Seated LAQ 10 x 2 second set with 3#  Seated heel/toe raises 10 x 2  Sit to stand from high low table (elevated) x10        Patient Education and Home Exercises     Home Exercises Provided and Patient Education Provided     Education  provided:   - HEP    Written Home Exercises Provided: Patient instructed to cont prior HEP. Exercises were reviewed and Alivia was able to demonstrate them prior to the end of the session.  Alivia demonstrated good  understanding of the education provided. See EMR under Patient Instructions for exercises provided during therapy sessions    ASSESSMENT     Alivia presents to treatment ambulating without assistive device with antalgic gait. She reports that her knee and back pain are at about the same level today.  Pt was able to tolerate all of her Tx with litle change in her pain level.  She was able to tolerate increased resistance with LAQ.   . Continue to progress as tolerated.     Alivia Is progressing well towards her goals.   Pt prognosis is Good.     Pt will continue to benefit from skilled outpatient physical therapy to address the deficits listed in the problem list box on initial evaluation, provide pt/family education and to maximize pt's level of independence in the home and community environment.     Pt's spiritual, cultural and educational needs considered and pt agreeable to plan of care and goals.     Anticipated Barriers for therapy: Scheduling     Goals:  Short Term Goals: 4 weeks   Pt will be instructed in an exercise program to address functional deficits related to her lower back pain  Pt will report that she is able to sit for >/= 30 minutes prior to the onset of back pain  Pt will report that she is able to stand for >/= 10 minutes prior to the onset of back pain     Long Term Goals: 8 weeks   Pt will be independent in a HEP to assist in managing their low back Sx.   Pt will report that she is able to tolerate sitting for >/= 45 minutes prior to the onset of back pain  Pt will report that she is able to stand for >/= 20 minutes prior to the onset of back pain  Pt will report improved functional ability through an improved score on the FOTO lumbar survey.    PLAN     Continue to progress per PT  POC    Beltran Estes, PT

## 2022-12-09 ENCOUNTER — CLINICAL SUPPORT (OUTPATIENT)
Dept: REHABILITATION | Facility: HOSPITAL | Age: 57
End: 2022-12-09
Attending: ANESTHESIOLOGY
Payer: MEDICARE

## 2022-12-09 DIAGNOSIS — M47.816 OSTEOARTHRITIS OF LUMBAR SPINE, UNSPECIFIED SPINAL OSTEOARTHRITIS COMPLICATION STATUS: Primary | ICD-10-CM

## 2022-12-09 PROCEDURE — 97110 THERAPEUTIC EXERCISES: CPT | Mod: PO

## 2022-12-12 ENCOUNTER — HOSPITAL ENCOUNTER (OUTPATIENT)
Facility: OTHER | Age: 57
Discharge: HOME | End: 2022-12-12
Attending: ANESTHESIOLOGY | Admitting: ANESTHESIOLOGY
Payer: MEDICARE

## 2022-12-12 VITALS
TEMPERATURE: 99 F | HEART RATE: 62 BPM | RESPIRATION RATE: 16 BRPM | OXYGEN SATURATION: 98 % | SYSTOLIC BLOOD PRESSURE: 135 MMHG | BODY MASS INDEX: 39.99 KG/M2 | WEIGHT: 240 LBS | HEIGHT: 65 IN | DIASTOLIC BLOOD PRESSURE: 71 MMHG

## 2022-12-12 DIAGNOSIS — G89.29 CHRONIC PAIN: ICD-10-CM

## 2022-12-12 DIAGNOSIS — M51.36 DDD (DEGENERATIVE DISC DISEASE), LUMBAR: Chronic | ICD-10-CM

## 2022-12-12 DIAGNOSIS — M17.11 PRIMARY OSTEOARTHRITIS OF RIGHT KNEE: Primary | ICD-10-CM

## 2022-12-12 LAB — POCT GLUCOSE: 94 MG/DL (ref 70–110)

## 2022-12-12 PROCEDURE — 64624 DSTRJ NULYT AGT GNCLR NRV: CPT | Mod: RT | Performed by: ANESTHESIOLOGY

## 2022-12-12 PROCEDURE — 99152 MOD SED SAME PHYS/QHP 5/>YRS: CPT | Mod: ,,, | Performed by: ANESTHESIOLOGY

## 2022-12-12 PROCEDURE — 64624 DSTRJ NULYT AGT GNCLR NRV: CPT | Mod: RT,,, | Performed by: ANESTHESIOLOGY

## 2022-12-12 PROCEDURE — 82947 ASSAY GLUCOSE BLOOD QUANT: CPT | Performed by: ANESTHESIOLOGY

## 2022-12-12 PROCEDURE — 64624 PR DESTRUCT, NEUROLYTIC AGENT, GENICULAR NERVE, W/IMG: ICD-10-PCS | Mod: RT,,, | Performed by: ANESTHESIOLOGY

## 2022-12-12 PROCEDURE — 25000003 PHARM REV CODE 250: Performed by: STUDENT IN AN ORGANIZED HEALTH CARE EDUCATION/TRAINING PROGRAM

## 2022-12-12 PROCEDURE — A4649 SURGICAL SUPPLIES: HCPCS | Performed by: ANESTHESIOLOGY

## 2022-12-12 PROCEDURE — 25000003 PHARM REV CODE 250: Performed by: ANESTHESIOLOGY

## 2022-12-12 PROCEDURE — 63600175 PHARM REV CODE 636 W HCPCS: Performed by: ANESTHESIOLOGY

## 2022-12-12 PROCEDURE — 99152 MOD SED SAME PHYS/QHP 5/>YRS: CPT | Performed by: ANESTHESIOLOGY

## 2022-12-12 PROCEDURE — 99152 PR MOD CONSCIOUS SEDATION, SAME PHYS, 5+ YRS, FIRST 15 MIN: ICD-10-PCS | Mod: ,,, | Performed by: ANESTHESIOLOGY

## 2022-12-12 RX ORDER — SODIUM CHLORIDE 9 MG/ML
500 INJECTION, SOLUTION INTRAVENOUS CONTINUOUS
Status: DISCONTINUED | OUTPATIENT
Start: 2022-12-12 | End: 2022-12-12 | Stop reason: HOSPADM

## 2022-12-12 RX ORDER — BUPIVACAINE HYDROCHLORIDE 2.5 MG/ML
INJECTION, SOLUTION EPIDURAL; INFILTRATION; INTRACAUDAL
Status: DISCONTINUED | OUTPATIENT
Start: 2022-12-12 | End: 2022-12-12 | Stop reason: HOSPADM

## 2022-12-12 RX ORDER — MIDAZOLAM HYDROCHLORIDE 1 MG/ML
INJECTION INTRAMUSCULAR; INTRAVENOUS
Status: DISCONTINUED | OUTPATIENT
Start: 2022-12-12 | End: 2022-12-12 | Stop reason: HOSPADM

## 2022-12-12 RX ORDER — FENTANYL CITRATE 50 UG/ML
INJECTION, SOLUTION INTRAMUSCULAR; INTRAVENOUS
Status: DISCONTINUED | OUTPATIENT
Start: 2022-12-12 | End: 2022-12-12 | Stop reason: HOSPADM

## 2022-12-12 RX ORDER — LIDOCAINE HYDROCHLORIDE 20 MG/ML
INJECTION, SOLUTION INFILTRATION; PERINEURAL
Status: DISCONTINUED | OUTPATIENT
Start: 2022-12-12 | End: 2022-12-12 | Stop reason: HOSPADM

## 2022-12-12 RX ORDER — TRIAMCINOLONE ACETONIDE 40 MG/ML
INJECTION, SUSPENSION INTRA-ARTICULAR; INTRAMUSCULAR
Status: DISCONTINUED | OUTPATIENT
Start: 2022-12-12 | End: 2022-12-12 | Stop reason: HOSPADM

## 2022-12-12 NOTE — DISCHARGE INSTRUCTIONS
Thank you for allowing us to care for you today. You may receive a survey about the care we provided. Your feedback is valuable and helps us provide excellent care throughout the community.     Home Care Instructions for Pain Management:    1. DIET:   You may resume your normal diet today.   2. BATHING:   You may shower with luke warm water. No tub baths or anything that will soak injection sites under water for the next 24 hours.  3. DRESSING:   You may remove your bandage today.   4. ACTIVITY LEVEL:   You may resume your normal activities 24 hrs after your procedure. Nothing strenuous today.  5. MEDICATIONS:   You may resume your normal medications today. To restart blood thinners, ask your doctor.  6. DRIVING    If you have received any sedatives by mouth today, you may not drive for 12 hours.    If you have received any sedation through your IV, you may not drive for 24 hrs.   7. SPECIAL INSTRUCTIONS:   No heat to the injection site for 24 hrs including, hot bath or shower, heating pad, moist heat, or hot tubs.    Use ice pack to injection site for any pain or discomfort.  Apply ice packs for 20 minute intervals as needed.    IF you have diabetes, be sure to monitor your blood sugar more closely. IF your injection contained steroids your blood sugar levels may become higher than normal.    If you are still having pain upon discharge:  Your pain may improve over the next 48 hours. The anesthetic (numbing medication) works immediately to 48 hours. IF your injection contained a steroid (anti-inflammatory medication), it takes approximately 3 days to start feeling relief and 7-10 days to see your greatest results from the medication. It is possible you may need subsequent injections. This would be discussed at your follow up appointment with pain management or your referring doctor.    Please call the PAIN MANAGEMENT office at [phone] or ON CALL pager at [phone] if you experienced any:   -Weakness or  loss of sensation  -Fever > 101.5  -Pain uncontrolled with oral medications   -Persistent nausea, vomiting, or diarrhea  -Redness or drainage from the injection sites, or any other worrisome concerns.   If physician on call was not reached or could not communicate with our office for any reason please go to the nearest emergency department. Adult Procedural Sedation Instructions    Recovery After Procedural Sedation (Adult)  You have been given medicine by vein to make you sleep during your surgery. This may have included both a pain medicine and sleeping medicine. Most of the effects have worn off. But you may still have some drowsiness for the next 6 to 8 hours.  Home care  Follow these guidelines when you get home:  For the next 8 hours, you should be watched by a responsible adult. This person should make sure your condition is not getting worse.  Don't drink any alcohol for the next 24 hours.  Don't drive, operate dangerous machinery, or make important business or personal decisions during the next 24 hours.  Note: Your healthcare provider may tell you not to take any medicine by mouth for pain or sleep in the next 4 hours. These medicines may react with the medicines you were given in the hospital. This could cause a much stronger response than usual.  Follow-up care  Follow up with your healthcare provider if you are not alert and back to your usual level of activity within 12 hours.  When to seek medical advice  Call your healthcare provider right away if any of these occur:  Drowsiness gets worse  Weakness or dizziness gets worse  Repeated vomiting  You can't be awakened   Date Last Reviewed: 10/18/2016  © 5222-2629 [employer]. [address] [place] [address], [place] [address]. All rights reserved. This information is not intended as a substitute for professional medical care. Always follow your healthcare professional's instructions.

## 2022-12-12 NOTE — DISCHARGE SUMMARY
Discharge Note  Short Stay      SUMMARY     Admit Date: 12/12/2022    Attending Physician: Israel Hurtado      Discharge Physician: Israel Hurtado      Discharge Date: 12/12/2022 9:30 AM    Procedure(s) (LRB):  RADIOFREQUENCY ABLATION RIGHT GENICULAR COOLED  ONE OF TWO  NEEDS CONSENT (Right)    Final Diagnosis: Chronic pain of both knees [M25.561, M25.562, G89.29]  S/p total knee replacement, bilateral [Z96.653]  Lumbar radiculopathy [M54.16]  Lumbar spondylosis [M47.816]  Intractable back pain [M54.9]  Sacroiliac joint pain [M53.3]  Chronic pain syndrome [G89.4]    Disposition: Home or self care    Patient Instructions:   Current Discharge Medication List        CONTINUE these medications which have NOT CHANGED    Details   albuterol (VENTOLIN HFA) 90 mcg/actuation inhaler INHALE TWO PUFFS INTO LUNGS EVERY 4 TO 6 HOURS AS NEEDED FOR SHORTNESS OF BREATH AND FOR WHEEZING  Qty: 18 g, Refills: 1    Comments: Please consider 90 day supplies to promote better adherence  Associated Diagnoses: Asthma, well controlled, mild intermittent      allopurinoL (ZYLOPRIM) 300 MG tablet Take 1 tablet (300 mg total) by mouth 2 (two) times daily.  Qty: 180 tablet, Refills: 3    Associated Diagnoses: Gout, unspecified cause, unspecified chronicity, unspecified site      atorvastatin (LIPITOR) 20 MG tablet TAKE 1 TABLET BY MOUTH ONCE DAILY.  Qty: 30 tablet, Refills: 3      b complex vitamins tablet Take 1 tablet by mouth every other day.       calcium citrate/vitamin D2 (ISIAH-CITRATE ORAL) Take 500 mg by mouth 2 (two) times daily.      colchicine (MITIGARE) 0.6 mg Cap Take 1 capsule (0.6 mg total) by mouth daily as needed (flare up).  Qty: 60 capsule, Refills: 3    Associated Diagnoses: Gout, unspecified cause, unspecified chronicity, unspecified site      cyanocobalamin (VITAMIN B-12) 1000 MCG tablet Take 100 mcg by mouth every morning.      diclofenac sodium (VOLTAREN) 1 % Gel Apply 2 g topically 4 (four) times daily as needed. To painful  area on the feet.  Qty: 500 g, Refills: 5    Comments: 5 x 100g tubes  Associated Diagnoses: Right foot pain; Enthesopathy of foot      FLUoxetine (PROZAC) 20 mg/5 mL (4 mg/mL) solution Take 5 mLs (20 mg total) by mouth once daily.  Qty: 450 mL, Refills: 3      fluticasone propionate (FLONASE) 50 mcg/actuation nasal spray 1 spray (50 mcg total) by Each Nostril route 2 (two) times daily as needed for Rhinitis.  Qty: 15 g, Refills: 6      indomethacin (INDOCIN) 50 MG capsule TAKE 1 CAPSULE BY MOUTH 2 TIMES DAILY WITH MEALS  Qty: 30 capsule, Refills: 2    Associated Diagnoses: Right foot pain; Enthesopathy of foot      LIDOcaine (XYLOCAINE) 5 % Oint ointment Apply topically as needed.  Qty: 35.44 g, Refills: 6    Associated Diagnoses: Right foot pain      MULTIVIT-IRON-MIN-FOLIC ACID 3,500-18-0.4 UNIT-MG-MG ORAL CHEW Take 1 tablet by mouth 2 (two) times daily.       nystatin (MYCOSTATIN) powder Apply topically 2 (two) times daily.  Qty: 60 g, Refills: 3      omeprazole (PRILOSEC) 20 MG capsule TAKE 1 CAPSULE BY MOUTH EVERY MORNING.  Qty: 30 capsule, Refills: 3    Associated Diagnoses: Gastroesophageal reflux disease without esophagitis      oxybutynin (DITROPAN-XL) 5 MG TR24 Take 1 tablet (5 mg total) by mouth once daily.  Qty: 90 tablet, Refills: 3    Associated Diagnoses: Mixed incontinence urge and stress (male)(female)      oxyCODONE-acetaminophen (PERCOCET)  mg per tablet Take 1 tablet by mouth every 8 (eight) hours as needed for Pain.  Qty: 90 tablet, Refills: 0    Comments: Quantity prescribed more than 7 day supply? Yes, quantity medically necessary  Associated Diagnoses: Lumbar spondylosis; Chronic pain syndrome      vitamin D 185 MG Tab Take 5,000 mg by mouth every morning.                  Discharge Diagnosis: Chronic pain of both knees [M25.561, M25.562, G89.29]  S/p total knee replacement, bilateral [Z96.653]  Lumbar radiculopathy [M54.16]  Lumbar spondylosis [M47.816]  Intractable back pain  [M54.9]  Sacroiliac joint pain [M53.3]  Chronic pain syndrome [G89.4]  Condition on Discharge: Stable with no complications to procedure   Diet on Discharge: Same as before.  Activity: as per instruction sheet.  Discharge to: Home with a responsible adult.  Follow up: 2-4 weeks       Please call my office or pager at [phone] if experienced any weakness or loss of sensation, fever > 101.5, pain uncontrolled with oral medications, persistent nausea/vomiting/or diarrhea, redness or drainage from the incisions, or any other worrisome concerns. If physician on call was not reached or could not communicate with our office for any reason please go to the nearest emergency department

## 2022-12-12 NOTE — OP NOTE
Therapeutic Genicular Cooled Nerve Radiofrequency Ablation under Fluoroscopy     The procedure, risks, benefits, and options were discussed with the patient. There are no contraindications to the procedure. The patent expressed understanding and agreed to the procedure. Informed written consent was obtained prior to the start of the procedure and can be found in the patient's chart.        PATIENT NAME: Alivia Thomas   MRN: [id number]     DATE OF PROCEDURE: 12/12/2022     PROCEDURE: Therapeutic Right Genicular Cooled Nerve Radiofrequency Ablation under Fluoroscopy    PRE-OP DIAGNOSIS: Chronic pain of both knees [M25.561, M25.562, G89.29]  S/p total knee replacement, bilateral [Z96.653]      POST-OP DIAGNOSIS: Chronic pain of both knees [M25.561, M25.562, G89.29]  S/p total knee replacement, bilateral [Z96.653]      PHYSICIAN: Israel Hurtado MD    ASSISTANTS: Maximus Menard MD, Clifford Irene MD [facility]     MEDICATIONS INJECTED:  Preservative-free Kenalog 40mg with 9cc of Bupivicaine 0.25%    LOCAL ANESTHETIC INJECTED:   Xylocaine 2%    SEDATION: Versed 2mg and Fentanyl 100mcg                                                                                                                                                                                     Conscious sedation ordered by M.D. Patient re-evaluation prior to administration of conscious sedation. No changes noted in patient's status from initial evaluation. The patient's vital signs were monitored by RN and patient remained hemodynamically stable throughout the procedure.    Event Time In   Sedation Start 0907   Sedation End 0937       ESTIMATED BLOOD LOSS:  None    COMPLICATIONS:  None     INTERVAL HISTORY: Patient has clinical findings of chronic knee pain. Patients has completed 2 previous diagnostic genicular nerve blocks with at least 80% relief for the expected duration of the local anesthetic utilized.     TECHNIQUE: Time-out was performed to  identify the patient and procedure to be performed. With the patient laying in a supine position, the surgical area was prepped and draped in the usual sterile fashion using ChloraPrep and fenestrated drape. Three target sites including the superior lateral genicular nerve where the lateral femoral shaft meets the epicondyle, the superior medial genicular nerve where the medial femoral shaft meets the epicondyle, and the inferior medial genicular nerve where the medial tibial shaft meets the epicondyle, were determined under fluoroscopic guidance. Skin anesthesia was achieved by injecting Lidocaine 2% over the injection sites. A 17 gauge, 50mm, 10mm active tip needle was then advanced under fluoroscopy in the AP and lateral views into the positions of the geniculate nerves at these levels. This was followed by motor testing at each of the nerves to ensure that there was no dorsiflexion of the foot. After negative aspiration for blood was confirmed, 1 mL of the lidocaine 2% listed above was injected slowly at each site. This was followed by cooled thermal lesioning at 80 degrees celsius for 150 seconds at each site. That was followed by slowly injecting 1 mL of the medication mixture listed above at each site. The needles were removed and bleeding was nil. A sterile dressing was applied. No specimens collected. The patient tolerated the procedure well and did not have any procedure related motor deficit at the conclusion of the procedure.    The patient was monitored after the procedure in the recovery area. They were given post-procedure and discharge instructions to follow at home. The patient was discharged in a stable condition.    PAIN BEFORE THE PROCEDURE: 8-10/10    PAIN AFTER THE PROCEDURE: 0/10.    Israel Hurtado MD

## 2022-12-14 DIAGNOSIS — E11.9 TYPE 2 DIABETES MELLITUS WITHOUT COMPLICATION: ICD-10-CM

## 2022-12-19 ENCOUNTER — PATIENT MESSAGE (OUTPATIENT)
Dept: ADMINISTRATIVE | Facility: HOSPITAL | Age: 57
End: 2022-12-19
Payer: MEDICARE

## 2022-12-23 ENCOUNTER — CLINICAL SUPPORT (OUTPATIENT)
Dept: REHABILITATION | Facility: HOSPITAL | Age: 57
End: 2022-12-23
Attending: ANESTHESIOLOGY
Payer: MEDICARE

## 2022-12-23 DIAGNOSIS — M47.816 OSTEOARTHRITIS OF LUMBAR SPINE, UNSPECIFIED SPINAL OSTEOARTHRITIS COMPLICATION STATUS: Primary | ICD-10-CM

## 2022-12-23 PROCEDURE — 97110 THERAPEUTIC EXERCISES: CPT | Mod: PO

## 2022-12-23 NOTE — PROGRESS NOTES
[facility]   Physical Therapy Treatment Note     Name: Alivia Thomas  Clinic Number: [id number]    Therapy Diagnosis:   Encounter Diagnosis   Name Primary?    Osteoarthritis of lumbar spine, unspecified spinal osteoarthritis complication status Yes     Physician: Israel Hurtado MD    Visit Date: 12/23/2022    Physician Orders: PT Eval and Treat   Medical Diagnosis from Referral:   M47.26 (ICD-10-CM) - Osteoarthritis of spine with radiculopathy, lumbar region   M17.0 (ICD-10-CM) - Primary osteoarthritis of both knees      Evaluation Date: 11/9/2022  Authorization Period Expiration: 10/5/2023  Plan of Care Expiration: 1/9/2023  Progress Note Due: 12/92022  Visit # / Visits authorized: 1/ 20  FOTO: 2/3     Precautions: Standard and Diabetes     PTA Visit #: 1/5     Time In: 7:04 am  Time Out: 7:45 am  Total Billable Time: 41 minutes    SUBJECTIVE     Pt reports: her knees and back remain sore, but she tries to keep moving  She was compliant with home exercise program.  Response to previous treatment: initial evaluation  Functional change: ongoing    Pain: 7/10  Location: bilateral knees, low back    OBJECTIVE     Objective Measures updated at progress report unless specified.   Limitation/Restriction for FOTO Lumbar spine Survey     Therapist reviewed FOTO scores for Alivia Thomas on 12/23/2022.   FOTO documents entered into [employer] - see Media section.     Limitation Score: 39%        Treatment     Alivia received the treatments listed below:      therapeutic exercises to develop core stabilization for 43 minutes including:  Nu step 8'  Seated abdominal bracing 10 x 2  Seated gluteal sets 10 x 2  Seated B hip abduction 10 x 2 with green TB  Seated B hip adduction 10 x 2 with a ball  Seated marching 10 x 2 (cueing for core engagement)  Seated LAQ 10 x 2 second set with 3#  Seated heel/toe raises 10 x 2  Sit to stand from high low table (elevated) x10 with UE support        Patient Education  and Home Exercises     Home Exercises Provided and Patient Education Provided     Education provided:   - HEP    Written Home Exercises Provided: Patient instructed to cont prior HEP. Exercises were reviewed and Alivia was able to demonstrate them prior to the end of the session.  Alivia demonstrated good  understanding of the education provided. See EMR under Patient Instructions for exercises provided during therapy sessions    ASSESSMENT     Alivia presents to treatment ambulating without assistive device with antalgic gait. She continues to report lower back and B knee pain, but tries to keep up with her exercises..  Pt was able to tolerate all  Tx activities with little change in her pain level. Continue to progress as tolerated.     Alivia Is progressing well towards her goals.   Pt prognosis is Good.     Pt will continue to benefit from skilled outpatient physical therapy to address the deficits listed in the problem list box on initial evaluation, provide pt/family education and to maximize pt's level of independence in the home and community environment.     Pt's spiritual, cultural and educational needs considered and pt agreeable to plan of care and goals.     Anticipated Barriers for therapy: Scheduling     Goals:  Short Term Goals: 4 weeks   Pt will be instructed in an exercise program to address functional deficits related to her lower back pain  Pt will report that she is able to sit for >/= 30 minutes prior to the onset of back pain  Pt will report that she is able to stand for >/= 10 minutes prior to the onset of back pain     Long Term Goals: 8 weeks   Pt will be independent in a HEP to assist in managing their low back Sx.   Pt will report that she is able to tolerate sitting for >/= 45 minutes prior to the onset of back pain  Pt will report that she is able to stand for >/= 20 minutes prior to the onset of back pain  Pt will report improved functional ability through an improved score on the FOTO  lumbar survey.    PLAN     Continue to progress per PT POC    Beltran Estes, PT

## 2023-01-09 ENCOUNTER — TELEPHONE (OUTPATIENT)
Dept: PAIN MEDICINE | Facility: CLINIC | Age: 58
End: 2023-01-09
Payer: MEDICARE

## 2023-01-09 ENCOUNTER — HOSPITAL ENCOUNTER (OUTPATIENT)
Facility: OTHER | Age: 58
Discharge: HOME | End: 2023-01-09
Attending: ANESTHESIOLOGY | Admitting: ANESTHESIOLOGY
Payer: MEDICARE

## 2023-01-09 ENCOUNTER — PATIENT MESSAGE (OUTPATIENT)
Dept: BARIATRICS | Facility: CLINIC | Age: 58
End: 2023-01-09
Payer: MEDICARE

## 2023-01-09 ENCOUNTER — OFFICE VISIT (OUTPATIENT)
Dept: PODIATRY | Facility: CLINIC | Age: 58
End: 2023-01-09
Payer: MEDICARE

## 2023-01-09 VITALS
HEART RATE: 53 BPM | SYSTOLIC BLOOD PRESSURE: 126 MMHG | HEIGHT: 65 IN | DIASTOLIC BLOOD PRESSURE: 77 MMHG | BODY MASS INDEX: 39.99 KG/M2 | WEIGHT: 240 LBS

## 2023-01-09 VITALS
OXYGEN SATURATION: 98 % | RESPIRATION RATE: 16 BRPM | HEART RATE: 61 BPM | WEIGHT: 240 LBS | HEIGHT: 65 IN | DIASTOLIC BLOOD PRESSURE: 68 MMHG | BODY MASS INDEX: 39.99 KG/M2 | TEMPERATURE: 98 F | SYSTOLIC BLOOD PRESSURE: 115 MMHG

## 2023-01-09 DIAGNOSIS — M25.561 CHRONIC PAIN OF RIGHT KNEE: ICD-10-CM

## 2023-01-09 DIAGNOSIS — G89.29 CHRONIC PAIN: ICD-10-CM

## 2023-01-09 DIAGNOSIS — E11.9 ENCOUNTER FOR DIABETIC FOOT EXAM: ICD-10-CM

## 2023-01-09 DIAGNOSIS — E11.49 TYPE II DIABETES MELLITUS WITH NEUROLOGICAL MANIFESTATIONS: Primary | ICD-10-CM

## 2023-01-09 DIAGNOSIS — L84 CORNS AND CALLUS: ICD-10-CM

## 2023-01-09 DIAGNOSIS — B35.1 DERMATOPHYTOSIS OF NAIL: ICD-10-CM

## 2023-01-09 DIAGNOSIS — G89.29 CHRONIC PAIN OF RIGHT KNEE: ICD-10-CM

## 2023-01-09 DIAGNOSIS — M17.0 PRIMARY OSTEOARTHRITIS OF BOTH KNEES: Primary | ICD-10-CM

## 2023-01-09 LAB — POCT GLUCOSE: 96 MG/DL (ref 70–110)

## 2023-01-09 PROCEDURE — 99499 UNLISTED E&M SERVICE: CPT | Mod: S$PBB,,, | Performed by: PODIATRIST

## 2023-01-09 PROCEDURE — 64624 DSTRJ NULYT AGT GNCLR NRV: CPT | Mod: LT,,, | Performed by: ANESTHESIOLOGY

## 2023-01-09 PROCEDURE — 99214 OFFICE O/P EST MOD 30 MIN: CPT | Mod: PBBFAC,PN | Performed by: PODIATRIST

## 2023-01-09 PROCEDURE — 11056 ROUTINE FOOT CARE: ICD-10-PCS | Mod: Q9,S$PBB,, | Performed by: PODIATRIST

## 2023-01-09 PROCEDURE — 99152 PR MOD CONSCIOUS SEDATION, SAME PHYS, 5+ YRS, FIRST 15 MIN: ICD-10-PCS | Mod: ,,, | Performed by: ANESTHESIOLOGY

## 2023-01-09 PROCEDURE — 99999 PR PBB SHADOW E&M-EST. PATIENT-LVL IV: ICD-10-PCS | Mod: PBBFAC,,, | Performed by: PODIATRIST

## 2023-01-09 PROCEDURE — 11721 ROUTINE FOOT CARE: ICD-10-PCS | Mod: Q9,59,S$PBB, | Performed by: PODIATRIST

## 2023-01-09 PROCEDURE — 11056 PARNG/CUTG B9 HYPRKR LES 2-4: CPT | Mod: Q9,S$PBB,, | Performed by: PODIATRIST

## 2023-01-09 PROCEDURE — 99152 MOD SED SAME PHYS/QHP 5/>YRS: CPT | Performed by: ANESTHESIOLOGY

## 2023-01-09 PROCEDURE — A4649 SURGICAL SUPPLIES: HCPCS | Performed by: ANESTHESIOLOGY

## 2023-01-09 PROCEDURE — 64624 DSTRJ NULYT AGT GNCLR NRV: CPT | Mod: LT | Performed by: ANESTHESIOLOGY

## 2023-01-09 PROCEDURE — 25000003 PHARM REV CODE 250: Performed by: ANESTHESIOLOGY

## 2023-01-09 PROCEDURE — 11721 DEBRIDE NAIL 6 OR MORE: CPT | Mod: Q9,PBBFAC,PN | Performed by: PODIATRIST

## 2023-01-09 PROCEDURE — 99499 NO LOS: ICD-10-PCS | Mod: S$PBB,,, | Performed by: PODIATRIST

## 2023-01-09 PROCEDURE — 64624 PR DESTRUCT, NEUROLYTIC AGENT, GENICULAR NERVE, W/IMG: ICD-10-PCS | Mod: LT,,, | Performed by: ANESTHESIOLOGY

## 2023-01-09 PROCEDURE — 25000003 PHARM REV CODE 250: Performed by: STUDENT IN AN ORGANIZED HEALTH CARE EDUCATION/TRAINING PROGRAM

## 2023-01-09 PROCEDURE — 99999 PR PBB SHADOW E&M-EST. PATIENT-LVL IV: CPT | Mod: PBBFAC,,, | Performed by: PODIATRIST

## 2023-01-09 PROCEDURE — 82947 ASSAY GLUCOSE BLOOD QUANT: CPT | Performed by: ANESTHESIOLOGY

## 2023-01-09 PROCEDURE — 99152 MOD SED SAME PHYS/QHP 5/>YRS: CPT | Mod: ,,, | Performed by: ANESTHESIOLOGY

## 2023-01-09 PROCEDURE — 63600175 PHARM REV CODE 636 W HCPCS: Performed by: ANESTHESIOLOGY

## 2023-01-09 RX ORDER — HYDROMORPHONE HCL 2 MG/ML
VIAL (ML) INJECTION
Status: DISCONTINUED | OUTPATIENT
Start: 2023-01-09 | End: 2023-01-09 | Stop reason: HOSPADM

## 2023-01-09 RX ORDER — LIDOCAINE HYDROCHLORIDE 20 MG/ML
INJECTION, SOLUTION INFILTRATION; PERINEURAL
Status: DISCONTINUED | OUTPATIENT
Start: 2023-01-09 | End: 2023-01-09 | Stop reason: HOSPADM

## 2023-01-09 RX ORDER — TRIAMCINOLONE ACETONIDE 40 MG/ML
INJECTION, SUSPENSION INTRA-ARTICULAR; INTRAMUSCULAR
Status: DISCONTINUED | OUTPATIENT
Start: 2023-01-09 | End: 2023-01-09 | Stop reason: HOSPADM

## 2023-01-09 RX ORDER — BUPIVACAINE HYDROCHLORIDE 5 MG/ML
INJECTION, SOLUTION EPIDURAL; INTRACAUDAL
Status: DISCONTINUED | OUTPATIENT
Start: 2023-01-09 | End: 2023-01-09 | Stop reason: HOSPADM

## 2023-01-09 RX ORDER — MIDAZOLAM HYDROCHLORIDE 1 MG/ML
INJECTION INTRAMUSCULAR; INTRAVENOUS
Status: DISCONTINUED | OUTPATIENT
Start: 2023-01-09 | End: 2023-01-09 | Stop reason: HOSPADM

## 2023-01-09 RX ORDER — FENTANYL CITRATE 50 UG/ML
INJECTION, SOLUTION INTRAMUSCULAR; INTRAVENOUS
Status: DISCONTINUED | OUTPATIENT
Start: 2023-01-09 | End: 2023-01-09 | Stop reason: HOSPADM

## 2023-01-09 RX ORDER — SODIUM CHLORIDE 9 MG/ML
500 INJECTION, SOLUTION INTRAVENOUS CONTINUOUS
Status: DISCONTINUED | OUTPATIENT
Start: 2023-01-09 | End: 2023-01-09 | Stop reason: HOSPADM

## 2023-01-09 NOTE — TELEPHONE ENCOUNTER
----- Message from Wilma Arrington sent at 1/9/2023  3:04 PM CST -----  Can the clinic reply in [facility]:              Please refill the medication(s) listed below. Please call the patient when the prescription(s) is ready for  at this phone [phone]              Medication #1oxyCODONE-acetaminophen (PERCOCET)  mg per tablet            Medication #2              [facility] [address]

## 2023-01-09 NOTE — DISCHARGE SUMMARY
Discharge Note  Short Stay      SUMMARY     Admit Date: 1/9/2023    Attending Physician: Israel Hurtado      Discharge Physician: Israel Hurtado      Discharge Date: 1/9/2023 9:19 AM    Procedure(s) (LRB):  RADIOFREQUENCY ABLATION LEFT GENICULAR COOLED  TWO OF TWO NEEDS CONSENT (Left)    Final Diagnosis: Chronic pain of both knees [M25.561, M25.562, G89.29]  S/p total knee replacement, bilateral [Z96.653]  Lumbar radiculopathy [M54.16]  Lumbar spondylosis [M47.816]  Intractable back pain [M54.9]  Sacroiliac joint pain [M53.3]  Chronic pain syndrome [G89.4]    Disposition: Home or self care    Patient Instructions:   Current Discharge Medication List        CONTINUE these medications which have NOT CHANGED    Details   albuterol (VENTOLIN HFA) 90 mcg/actuation inhaler INHALE TWO PUFFS INTO LUNGS EVERY 4 TO 6 HOURS AS NEEDED FOR SHORTNESS OF BREATH AND FOR WHEEZING  Qty: 18 g, Refills: 1    Comments: Please consider 90 day supplies to promote better adherence  Associated Diagnoses: Asthma, well controlled, mild intermittent      allopurinoL (ZYLOPRIM) 300 MG tablet Take 1 tablet (300 mg total) by mouth 2 (two) times daily.  Qty: 180 tablet, Refills: 3    Associated Diagnoses: Gout, unspecified cause, unspecified chronicity, unspecified site      atorvastatin (LIPITOR) 20 MG tablet TAKE 1 TABLET BY MOUTH ONCE DAILY.  Qty: 30 tablet, Refills: 3      b complex vitamins tablet Take 1 tablet by mouth every other day.       calcium citrate/vitamin D2 (ISIAH-CITRATE ORAL) Take 500 mg by mouth 2 (two) times daily.      colchicine (MITIGARE) 0.6 mg Cap Take 1 capsule (0.6 mg total) by mouth daily as needed (flare up).  Qty: 60 capsule, Refills: 3    Associated Diagnoses: Gout, unspecified cause, unspecified chronicity, unspecified site      cyanocobalamin (VITAMIN B-12) 1000 MCG tablet Take 100 mcg by mouth every morning.      diclofenac sodium (VOLTAREN) 1 % Gel Apply 2 g topically 4 (four) times daily as needed. To painful area on  the feet.  Qty: 500 g, Refills: 5    Comments: 5 x 100g tubes  Associated Diagnoses: Right foot pain; Enthesopathy of foot      FLUoxetine (PROZAC) 20 mg/5 mL (4 mg/mL) solution Take 5 mLs (20 mg total) by mouth once daily.  Qty: 450 mL, Refills: 3      fluticasone propionate (FLONASE) 50 mcg/actuation nasal spray 1 spray (50 mcg total) by Each Nostril route 2 (two) times daily as needed for Rhinitis.  Qty: 15 g, Refills: 6      indomethacin (INDOCIN) 50 MG capsule TAKE 1 CAPSULE BY MOUTH 2 TIMES DAILY WITH MEALS  Qty: 30 capsule, Refills: 2    Associated Diagnoses: Right foot pain; Enthesopathy of foot      LIDOcaine (XYLOCAINE) 5 % Oint ointment Apply topically as needed.  Qty: 35.44 g, Refills: 6    Associated Diagnoses: Right foot pain      MULTIVIT-IRON-MIN-FOLIC ACID 3,500-18-0.4 UNIT-MG-MG ORAL CHEW Take 1 tablet by mouth 2 (two) times daily.       nystatin (MYCOSTATIN) powder Apply topically 2 (two) times daily.  Qty: 60 g, Refills: 3      omeprazole (PRILOSEC) 20 MG capsule TAKE 1 CAPSULE BY MOUTH EVERY MORNING.  Qty: 30 capsule, Refills: 3    Associated Diagnoses: Gastroesophageal reflux disease without esophagitis      oxybutynin (DITROPAN-XL) 5 MG TR24 Take 1 tablet (5 mg total) by mouth once daily.  Qty: 90 tablet, Refills: 3    Associated Diagnoses: Mixed incontinence urge and stress (male)(female)      oxyCODONE-acetaminophen (PERCOCET)  mg per tablet Take 1 tablet by mouth every 8 (eight) hours as needed for Pain.  Qty: 90 tablet, Refills: 0    Comments: Quantity prescribed more than 7 day supply? Yes, quantity medically necessary  Associated Diagnoses: Lumbar spondylosis; Chronic pain syndrome      vitamin D 185 MG Tab Take 5,000 mg by mouth every morning.                  Discharge Diagnosis: Chronic pain of both knees [M25.561, M25.562, G89.29]  S/p total knee replacement, bilateral [Z96.653]  Lumbar radiculopathy [M54.16]  Lumbar spondylosis [M47.816]  Intractable back pain  [M54.9]  Sacroiliac joint pain [M53.3]  Chronic pain syndrome [G89.4]  Condition on Discharge: Stable with no complications to procedure   Diet on Discharge: Same as before.  Activity: as per instruction sheet.  Discharge to: Home with a responsible adult.  Follow up: 2-4 weeks       Please call my office or pager at [phone] if experienced any weakness or loss of sensation, fever > 101.5, pain uncontrolled with oral medications, persistent nausea/vomiting/or diarrhea, redness or drainage from the incisions, or any other worrisome concerns. If physician on call was not reached or could not communicate with our office for any reason please go to the nearest emergency department

## 2023-01-09 NOTE — DISCHARGE INSTRUCTIONS

## 2023-01-09 NOTE — PROGRESS NOTES
Chief Complaint   Patient presents with    Follow-up     Foot Exam/PCP Clarisse Richardson MD  05/09/22           HPI:   The patient is a 57 y.o.  female  who presents for a diabetic foot exam/care   Patient reports + presence of abnormal sensation to the feet, just sometimes, mostly at night.   Patient has no history of wounds on the feet.   Hx of foot surgery: none.     Shoe gear: casual shoes  This patient has documented high risk feet requiring routine maintenance secondary to diabetes.            Primary care doctor is: Clarisse Richardson MD  Patient last saw primary care doctor on:  5/9/2022        Patient Active Problem List   Diagnosis    Morbid obesity    PADDY (obstructive sleep apnea)    Type 2 diabetes mellitus without complication, without long-term current use of insulin    Nuclear sclerosis - Both Eyes    Hyperlipemia    Proteinuria    Type 2 diabetes mellitus without retinopathy - Both Eyes    Bilateral knee pain    DJD (degenerative joint disease) of knee    Debility    Prosthetic joint implant failure    Failed total knee arthroplasty    Right knee pain    Knee pain    History of total left knee replacement    Lumbago    CTS (carpal tunnel syndrome)    Right carpal tunnel syndrome    Chronic, continuous use of opioids    Mild intermittent asthma without complication    Left arm pain    Left shoulder pain    Allergic reaction to food    DDD (degenerative disc disease), cervical    Cervical radiculopathy    Cervical spondylosis    Cubital tunnel syndrome on right    Bilateral shoulder bursitis    Cervical stenosis of spinal canal    DDD (degenerative disc disease), thoracic    Thoracic spondylosis    Spondylolisthesis of lumbar region    Lumbar spondylosis    Spondylolisthesis of cervical region    Cervical spine instability    Myeloradiculopathy    Neural foraminal stenosis of cervical spine    DDD (degenerative disc disease), lumbar    Idiopathic scoliosis of thoracolumbar spine    Bilateral  shoulder region arthritis    Impingement syndrome of right shoulder    Trochanteric bursitis of both hips    Chronic pain    Depression    Recurrent major depressive disorder, in partial remission    GERD (gastroesophageal reflux disease)    Trigger finger    Dyspnea    BMI 45.0-49.9, adult    Sacroiliac joint pain    Spondylosis of lumbosacral region without myelopathy or radiculopathy    Subacromial bursitis of left shoulder joint    Sacroiliitis    Snoring    BMI 39.0-39.9,adult    S/P panniculectomy    Gout    History of sleeve gastrectomy    History of total right knee replacement    Noncompliance with CPAP treatment    Osteoarthritis of lumbar spine           Current Outpatient Medications on File Prior to Visit   Medication Sig Dispense Refill    albuterol (VENTOLIN HFA) 90 mcg/actuation inhaler INHALE TWO PUFFS INTO LUNGS EVERY 4 TO 6 HOURS AS NEEDED FOR SHORTNESS OF BREATH AND FOR WHEEZING 18 g 1    allopurinoL (ZYLOPRIM) 300 MG tablet Take 1 tablet (300 mg total) by mouth 2 (two) times daily. 180 tablet 3    atorvastatin (LIPITOR) 20 MG tablet TAKE 1 TABLET BY MOUTH ONCE DAILY. 30 tablet 3    b complex vitamins tablet Take 1 tablet by mouth every other day.       calcium citrate/vitamin D2 (ISIAH-CITRATE ORAL) Take 500 mg by mouth 2 (two) times daily.      colchicine (MITIGARE) 0.6 mg Cap Take 1 capsule (0.6 mg total) by mouth daily as needed (flare up). 60 capsule 3    cyanocobalamin (VITAMIN B-12) 1000 MCG tablet Take 100 mcg by mouth every morning.      diclofenac sodium (VOLTAREN) 1 % Gel Apply 2 g topically 4 (four) times daily as needed. To painful area on the feet. 500 g 5    FLUoxetine (PROZAC) 20 mg/5 mL (4 mg/mL) solution Take 5 mLs (20 mg total) by mouth once daily. 450 mL 3    fluticasone propionate (FLONASE) 50 mcg/actuation nasal spray 1 spray (50 mcg total) by Each Nostril route 2 (two) times daily as needed for Rhinitis. 15 g 6    indomethacin (INDOCIN) 50 MG capsule TAKE 1 CAPSULE BY MOUTH 2  TIMES DAILY WITH MEALS 30 capsule 2    LIDOcaine (XYLOCAINE) 5 % Oint ointment Apply topically as needed. 35.44 g 6    MULTIVIT-IRON-MIN-FOLIC ACID 3,500-18-0.4 UNIT-MG-MG ORAL CHEW Take 1 tablet by mouth 2 (two) times daily.       nystatin (MYCOSTATIN) powder Apply topically 2 (two) times daily. 60 g 3    omeprazole (PRILOSEC) 20 MG capsule TAKE 1 CAPSULE BY MOUTH EVERY MORNING. 30 capsule 3    oxyCODONE-acetaminophen (PERCOCET)  mg per tablet Take 1 tablet by mouth every 8 (eight) hours as needed for Pain. 90 tablet 0    vitamin D 185 MG Tab Take 5,000 mg by mouth every morning.       oxybutynin (DITROPAN-XL) 5 MG TR24 Take 1 tablet (5 mg total) by mouth once daily. 90 tablet 3     Current Facility-Administered Medications on File Prior to Visit   Medication Dose Route Frequency Provider Last Rate Last Admin    0.9%  NaCl infusion   Intravenous Continuous Lina Wagner MD 25 mL/hr at 12/15/20 1506 25 mL/hr at 12/15/20 1506    [DISCONTINUED] 0.9%  NaCl infusion  500 mL Intravenous Continuous Ciro Chung MD   500 mL at 01/09/23 0856    [DISCONTINUED] BUPivacaine (PF) 0.5% (5 mg/mL) injection    PRCHRISTOFER Hurtado MD   10 mL at 01/09/23 0901    [DISCONTINUED] fentaNYL injection    PRCHRISTOFER Hurtado MD   25 mcg at 01/09/23 0856    [DISCONTINUED] HYDROmorphone injection    JUANI Hurtado MD   1 mg at 01/09/23 0856    [DISCONTINUED] LIDOcaine HCL 20 mg/ml (2%) injection    PRCHRISTOFER Hurtado MD   10 mL at 01/09/23 0901    [DISCONTINUED] midazolam (VERSED) 1 mg/mL injection    PRCHRISTOFER Hurtado MD   2 mg at 01/09/23 0856    [DISCONTINUED] triamcinolone acetonide injection    JUANI Hurtado MD   40 mg at 01/09/23 0901       Review of patient's allergies indicates:  No Known Allergies                  ROS:  General ROS: negative  Respiratory ROS: no cough, shortness of breath, or wheezing  Cardiovascular ROS: no chest pain or dyspnea on exertion  Musculoskeletal ROS: negative  Neurological ROS:   negative for  "- gait disturbance, + numbness/tingling, seizures or tremors  Dermatological ROS: + nail changes, dry skin          LAST HbA1c:   Hemoglobin A1C   Date Value Ref Range Status   05/09/2022 5.9 (H) 4.0 - 5.6 % Final     Comment:     ADA Screening Guidelines:  5.7-6.4%  Consistent with prediabetes  >or=6.5%  Consistent with diabetes    High levels of fetal hemoglobin interfere with the HbA1C  assay. Heterozygous hemoglobin variants (HbS, HgC, etc)do  not significantly interfere with this assay.   However, presence of multiple variants may affect accuracy.     11/08/2021 6.3 (H) 4.0 - 5.6 % Final     Comment:     ADA Screening Guidelines:  5.7-6.4%  Consistent with prediabetes  >or=6.5%  Consistent with diabetes    High levels of fetal hemoglobin interfere with the HbA1C  assay. Heterozygous hemoglobin variants (HbS, HgC, etc)do  not significantly interfere with this assay.   However, presence of multiple variants may affect accuracy.     05/03/2021 6.5 (H) 4.0 - 5.6 % Final     Comment:     ADA Screening Guidelines:  5.7-6.4%  Consistent with prediabetes  >or=6.5%  Consistent with diabetes    High levels of fetal hemoglobin interfere with the HbA1C  assay. Heterozygous hemoglobin variants (HbS, HgC, etc)do  not significantly interfere with this assay.   However, presence of multiple variants may affect accuracy.           EXAM:   Vitals:    01/09/23 1001   BP: 126/77   Pulse: (!) 53   Weight: 108.9 kg (240 lb)   Height: 5' 5" (1.651 m)       General: Pt. is well-developed, well-nourished, appears stated age, in no acute distress, alert and oriented x 3.      Vascular: Dorsalis pedis and posterior tibial pulses are 2/4 bilaterally. 3 secs capillary refill time and toes are warm to touch.    There is reduced hair growth on the feet.    There is 1+ edema.  no varicosities.      Neurological:  Light touch, proprioception, and sharp/dull sensation are all intact bilaterally. Protective threshold with the [place]-Lindsay " monofilament is diminished bilaterally.   +paresthesias      Dermatological:  The skin is thin    The toenails  1-5 L and 1-5 R  are greenish- yellow, long by 5-6mm and thickened by 2-3mm with subungual debris  .   There are no open wounds. There is no ecchymoses.  no erythema noted.   Skin diffusely dry on the soles     Interdigital spaces are intact, no fissures    Skin atrophic, thin, dry and mildly hyperpigmented.       Musculoskeletal:    Muscle strength is 5/5 in all groups bilaterally.    Metatarsophalangeal, subtalar, and ankle range of motion are intact without crepitus.     Ankle equinus bilateral       Assessment / Plan:      ICD-10-CM ICD-9-CM    1. Type II diabetes mellitus with neurological manifestations  E11.49 250.60       2. Dermatophytosis of nail  B35.1 110.1       3. Corns and callus  L84 700       4. Encounter for diabetic foot exam  E11.9 250.00           I counseled the patient on the patient's conditions, their implications and medical management.  Shoe inspection.     Continue good nutrition and blood sugar control to help prevent podiatric complications of diabetes.   Maintain proper foot hygiene.   Continue wearing proper shoe gear, daily foot inspections, never walking without protective shoe gear, never putting sharp instruments to feet.  Shoe and activity modification as needed for relief.         Routine Foot Care    Date/Time: 1/9/2023 10:00 AM  Performed by: Stacey Rowland DPM  Authorized by: Stacey Rowland DPM     Consent Done?:  Yes (Verbal)  Hyperkeratotic Skin Lesions?: Yes    Number of trimmed lesions:  2  Location(s):  Left Heel and Right Heel    Nail Care Type:  Debride  Location(s): All  (Left 1st Toe, Left 3rd Toe, Left 2nd Toe, Left 4th Toe, Left 5th Toe, Right 1st Toe, Right 2nd Toe, Right 3rd Toe, Right 4th Toe and Right 5th Toe)  Patient tolerance:  Patient tolerated the procedure well with no immediate complications     With patient's permission, the toenails mentioned  above were reduced and debrided using a nail nipper, removing offending nail and debris.   Utilizing a #15 scalpel, I trimmed the corns and calluses at the above mentioned location.      The patient will continue to monitor the areas daily, inspect the feet, wear protective shoe gear when ambulatory, and moisturizer to maintain skin integrity.

## 2023-01-09 NOTE — OP NOTE
Therapeutic Genicular Cooled Nerve Radiofrequency Ablation under Fluoroscopy     The procedure, risks, benefits, and options were discussed with the patient. There are no contraindications to the procedure. The patent expressed understanding and agreed to the procedure. Informed written consent was obtained prior to the start of the procedure and can be found in the patient's chart.        PATIENT NAME: Alivia Thomas   MRN: [id number]     DATE OF PROCEDURE: 01/09/2023     PROCEDURE: Therapeutic Left Genicular Cooled Nerve Radiofrequency Ablation under Fluoroscopy    PRE-OP DIAGNOSIS: Chronic pain of both knees [M25.561, M25.562, G89.29]  S/p total knee replacement, bilateral [Z96.653]  Lumbar radiculopathy [M54.16]  Lumbar spondylosis [M47.816]  Intractable back pain [M54.9]  Sacroiliac joint pain [M53.3]  Chronic pain syndrome [G89.4]    POST-OP DIAGNOSIS: Chronic pain of both knees [M25.561, M25.562, G89.29]  S/p total knee replacement, bilateral [Z96.653]  Lumbar radiculopathy [M54.16]  Lumbar spondylosis [M47.816]  Intractable back pain [M54.9]  Sacroiliac joint pain [M53.3]  Chronic pain syndrome [G89.4]    PHYSICIAN: Israel Hurtado MD    ASSISTANTS: Ciro Chung MD Resident      MEDICATIONS INJECTED:  Preservative-free Kenalog 40mg with 9cc of Bupivicaine 0.25%    LOCAL ANESTHETIC INJECTED:   Xylocaine 2%    SEDATION: Versed 2mg and Fentanyl 25mcg Dilaudid 1mg                                                                                                                                                                Conscious sedation ordered by M.D. Patient re-evaluation prior to administration of conscious sedation. No changes noted in patient's status from initial evaluation. The patient's vital signs were monitored by RN and patient remained hemodynamically stable throughout the procedure.    Event Time In   Sedation Start 0857   Sedation End 0916       ESTIMATED BLOOD LOSS:  None    COMPLICATIONS:  None      INTERVAL HISTORY: Patient has clinical findings of chronic knee pain. Patients has completed 2 previous diagnostic genicular nerve blocks with at least 80% relief for the expected duration of the local anesthetic utilized.     TECHNIQUE: Time-out was performed to identify the patient and procedure to be performed. With the patient laying in a supine position, the surgical area was prepped and draped in the usual sterile fashion using ChloraPrep and fenestrated drape. Three target sites including the superior lateral genicular nerve where the lateral femoral shaft meets the epicondyle, the superior medial genicular nerve where the medial femoral shaft meets the epicondyle, and the inferior medial genicular nerve where the medial tibial shaft meets the epicondyle, were determined under fluoroscopic guidance. Skin anesthesia was achieved by injecting Lidocaine 2% over the injection sites. A 17 gauge, 50mm, 10mm active tip needle was then advanced under fluoroscopy in the AP and lateral views into the positions of the geniculate nerves at these levels. This was followed by motor testing at each of the nerves to ensure that there was no dorsiflexion of the foot. After negative aspiration for blood was confirmed, 1 mL of the lidocaine 2% listed above was injected slowly at each site. This was followed by cooled thermal lesioning at 80 degrees celsius for 150 seconds at each site. That was followed by slowly injecting 3 mL of the medication mixture listed above at each site. The needles were removed and bleeding was nil. A sterile dressing was applied. No specimens collected. The patient tolerated the procedure well and did not have any procedure related motor deficit at the conclusion of the procedure.    The patient was monitored after the procedure in the recovery area. They were given post-procedure and discharge instructions to follow at home. The patient was discharged in a stable condition.    PAIN BEFORE THE  PROCEDURE: 8-10/10    PAIN AFTER THE PROCEDURE: 0/10      Israel Hurtado MD

## 2023-01-09 NOTE — H&P
HPI  Patient presenting for Procedure(s) (LRB):  RADIOFREQUENCY ABLATION LEFT GENICULAR COOLED  TWO OF TWO NEEDS CONSENT (Left)     Patient on Anti-coagulation No    No health changes since previous encounter    Past Medical History:   Diagnosis Date    Allergy     Anemia     Asthma     Bilateral shoulder bursitis     Cervical stenosis of spine     Chronic pain     DDD (degenerative disc disease), cervical     Depression 1/28/2019    Diabetes mellitus type II     DJD (degenerative joint disease) of knee 6/19/2014    Facet arthritis of lumbar region 12/17/2015    Facet syndrome 12/17/2015    GERD (gastroesophageal reflux disease)     Heartburn     Hyperlipidemia     Hypertension     Morbid obesity     Neuromuscular disorder     PADDY (obstructive sleep apnea)     Proteinuria     Right carpal tunnel syndrome     Sacroiliitis 6/13/2018    Sacroiliitis     Sleep apnea      Past Surgical History:   Procedure Laterality Date    CARPAL TUNNEL RELEASE      CARPAL TUNNEL RELEASE  1980s    left    CARPAL TUNNEL RELEASE  2012    right    COLONOSCOPY N/A 6/15/2020    Procedure: COLONOSCOPY;  Surgeon: Jc Koo MD;  Location: [facility] (4TH FLR);  Service: Endoscopy;  Laterality: N/A;  COVID screening on 6/13/20 at [facility]-rb  pt updated on drop off location and no visitor policy-rb    ESOPHAGOGASTRODUODENOSCOPY N/A 3/27/2019    Procedure: EGD (ESOPHAGOGASTRODUODENOSCOPY);  Surgeon: Robbin Bar MD;  Location: [facility] (2ND FLR);  Service: Endoscopy;  Laterality: N/A;  BMI 61.3/2nd floor case/svn    INJECTION OF JOINT Bilateral 6/9/2020    Procedure: INJECTION, JOINT, BILATERAL SI;  Surgeon: Lina Wagner MD;  Location: [facility] PAIN MGT;  Service: Pain Management;  Laterality: Bilateral;  B/L SI Joint Injection    INJECTION OF JOINT Bilateral 5/11/2021    Procedure: INJECTION, JOINT, SACROILIAC (SI) need consent;  Surgeon: Lina Wagner MD;  Location: [facility] PAIN MGT;  Service: Pain Management;  Laterality:  Bilateral;    JOINT REPLACEMENT Bilateral     with 2 revisions on rt    KNEE SURGERY  3/2010    orthroscope    KNEE SURGERY  6-19-14    left TKR    LAPAROSCOPIC SLEEVE GASTRECTOMY N/A 8/28/2019    Procedure: GASTRECTOMY, SLEEVE, LAPAROSCOPIC with intraop EGD;  Surgeon: Heriberto Clements MD;  Location: [facility] OR 2ND FLR;  Service: General;  Laterality: N/A;    PANNICULECTOMY N/A 6/14/2022    Procedure: PANNICULECTOMY;  Surgeon: Rhett Gray MD;  Location: [facility] OR 2ND FLR;  Service: Plastics;  Laterality: N/A;    RADIOFREQUENCY ABLATION Right 7/9/2019    Procedure: RADIOFREQUENCY ABLATION;  Surgeon: Lina Wagner MD;  Location: [facility] PAIN MGT;  Service: Pain Management;  Laterality: Right;  RIGHT RFA L2,3,4,5  2 of 2    RADIOFREQUENCY ABLATION Left 12/19/2019    Procedure: RADIOFREQUENCY ABLATION, LEFT L2-L3-L4-L5 MEDIAL BRANCH 1 OF 2;  Surgeon: Lina Wagner MD;  Location: [facility] PAIN MGT;  Service: Pain Management;  Laterality: Left;    RADIOFREQUENCY ABLATION Right 12/31/2019    Procedure: RADIOFREQUENCY ABLATION, RIGHT L2-L3-L4-L5  2 OF 2;  Surgeon: Lina Wagner MD;  Location: [facility] PAIN MGT;  Service: Pain Management;  Laterality: Right;    RADIOFREQUENCY ABLATION Right 10/13/2020    Procedure: RADIOFREQUENCY ABLATION, Genicular Cooled 1 of 2;  Surgeon: Lina Wagner MD;  Location: [facility] PAIN MGT;  Service: Pain Management;  Laterality: Right;    RADIOFREQUENCY ABLATION Left 11/3/2020    Procedure: RADIOFREQUENCY ABLATION, GENICULAR COOLED  2 OF 2;  Surgeon: Lina Wagner MD;  Location: [facility] PAIN MGT;  Service: Pain Management;  Laterality: Left;    RADIOFREQUENCY ABLATION Left 12/15/2020    Procedure: RADIOFREQUENCY ABLATION LEFT L2,3,4,5 1 of 2;  Surgeon: Lina Wagner MD;  Location: [facility] PAIN MGT;  Service: Pain Management;  Laterality: Left;    RADIOFREQUENCY ABLATION Right 12/29/2020    Procedure: RADIOFREQUENCY ABLATION RIGHT L2,3,4,5 2 of 2;  Surgeon: Lina QUICK  MD Kristy;  Location: [facility] PAIN MGT;  Service: Pain Management;  Laterality: Right;    RADIOFREQUENCY ABLATION Left 6/29/2021    Procedure: RADIOFREQUENCY ABLATION GENICULAR COOLED;  Surgeon: Lina Wagner MD;  Location: [facility] [place] PAIN MGT;  Service: Pain Management;  Laterality: Left;  1 of 2    RADIOFREQUENCY ABLATION Right 7/13/2021    Procedure: RADIOFREQUENCY ABLATION GENICULAR;  Surgeon: Lina Wagner MD;  Location: [facility] [place] PAIN MGT;  Service: Pain Management;  Laterality: Right;  2 of 2    RADIOFREQUENCY ABLATION Right 8/24/2021    Procedure: RADIOFREQUENCY ABLATION, L2-L3-L4-L5 MEDIAL BRANCH 1 OF 2;  Surgeon: Lina Wagner MD;  Location: [facility] PAIN MGT;  Service: Pain Management;  Laterality: Right;    RADIOFREQUENCY ABLATION Left 9/11/2021    Procedure: RADIOFREQUENCY ABLATION, L2-L3-L4-L5 MEDIAL BR ANCH 2 OF 2;  Surgeon: Lina Wagner MD;  Location: [facility] [place] PAIN MGT;  Service: Pain Management;  Laterality: Left;    RADIOFREQUENCY ABLATION Right 3/7/2022    Procedure: RADIOFREQUENCY ABLATION RIGHT L3,4,5 1 of 2, needs consent;  Surgeon: Israel Hurtado MD;  Location: [facility] [place] PAIN MGT;  Service: Pain Management;  Laterality: Right;    RADIOFREQUENCY ABLATION Left 3/21/2022    Procedure: RADIOFREQUENCY ABLATION RIGHT L3,4,5 2 of 2, needs consent;  Surgeon: Israel Hurtado MD;  Location: [facility] [place] PAIN MGT;  Service: Pain Management;  Laterality: Left;    RADIOFREQUENCY ABLATION Right 5/9/2022    Procedure: RADIOFREQUENCY ABLATION RIGHT GENICULAR COOLED 1 of 2;  Surgeon: Israel Hurtado MD;  Location: [facility] [place] PAIN MGT;  Service: Pain Management;  Laterality: Right;    RADIOFREQUENCY ABLATION Left 5/23/2022    Procedure: RADIOFREQUENCY ABLATION LEFT GENICULAR COOLED  2 of 2;  Surgeon: Israel Hurtado MD;  Location: [facility] [place] PAIN MGT;  Service: Pain Management;  Laterality: Left;    RADIOFREQUENCY ABLATION Right 12/12/2022    Procedure: RADIOFREQUENCY ABLATION RIGHT GENICULAR COOLED  ONE OF TWO  NEEDS CONSENT;  Surgeon: Israel  MD Bryant;  Location: [facility] PAIN MGT;  Service: Pain Management;  Laterality: Right;    RADIOFREQUENCY ABLATION OF LUMBAR MEDIAL BRANCH NERVE AT SINGLE LEVEL Left 6/26/2018    Procedure: RADIOFREQUENCY ABLATION, NERVE, MEDIAL BRANCH, LUMBAR, 1 LEVEL;  Surgeon: Lina Wagner MD;  Location: [facility] PAIN MGT;  Service: Pain Management;  Laterality: Left;  Left Cooled RFA @ L2,3,4,5  [id number]  with Sedation    1 of 2    RADIOFREQUENCY ABLATION OF LUMBAR MEDIAL BRANCH NERVE AT SINGLE LEVEL Right 7/10/2018    Procedure: RADIOFREQUENCY ABLATION, NERVE, MEDIAL BRANCH, LUMBAR, 1 LEVEL;  Surgeon: Lina Wagner MD;  Location: [facility] PAIN MGT;  Service: Pain Management;  Laterality: Right;  RIght Cooled RFA @ L2,3,4,5  [id number] with Sedation    2 of 2    TRIGGER FINGER RELEASE Right 2017    TRIGGER FINGER RELEASE Left 7/29/2019    Procedure: RELEASE, TRIGGER FINGER, Left Thumb;  Surgeon: Velma Garcia MD;  Location: [facility] OR;  Service: Orthopedics;  Laterality: Left;  Local w/ MAC     Review of patient's allergies indicates:   Allergen Reactions    Strawberry Anaphylaxis      No current facility-administered medications for this encounter.     Facility-Administered Medications Ordered in Other Encounters   Medication    0.9%  NaCl infusion       PMHx, PSHx, Allergies, Medications reviewed in epic    ROS negative except pain complaints in HPI    OBJECTIVE:    LMP 06/26/2018     PHYSICAL EXAMINATION:    GENERAL: Well appearing, in no acute distress, alert and oriented x3.  PSYCH:  Mood and affect appropriate.  SKIN: Skin color, texture, turgor normal, no rashes or lesions which will impact the procedure.  CV: RRR with palpation of the radial artery.  PULM: No evidence of respiratory difficulty, symmetric chest rise. Clear to auscultation.  NEURO: Cranial nerves grossly intact.    Plan:    Proceed with procedure as planned Procedure(s) (LRB):  RADIOFREQUENCY ABLATION LEFT GENICULAR COOLED  TWO OF TWO NEEDS CONSENT  (Left)        Ciro Chung MD  [facility], PGY-4

## 2023-01-27 ENCOUNTER — TELEPHONE (OUTPATIENT)
Dept: PAIN MEDICINE | Facility: CLINIC | Age: 58
End: 2023-01-27
Payer: MEDICARE

## 2023-01-30 ENCOUNTER — OFFICE VISIT (OUTPATIENT)
Dept: PAIN MEDICINE | Facility: CLINIC | Age: 58
End: 2023-01-30
Payer: MEDICARE

## 2023-01-30 ENCOUNTER — PATIENT MESSAGE (OUTPATIENT)
Dept: PAIN MEDICINE | Facility: OTHER | Age: 58
End: 2023-01-30
Payer: MEDICARE

## 2023-01-30 DIAGNOSIS — M47.816 SPONDYLOSIS OF LUMBAR REGION WITHOUT MYELOPATHY OR RADICULOPATHY: Primary | ICD-10-CM

## 2023-01-30 DIAGNOSIS — G89.4 CHRONIC PAIN DISORDER: ICD-10-CM

## 2023-01-30 DIAGNOSIS — M25.562 CHRONIC PAIN OF BOTH KNEES: ICD-10-CM

## 2023-01-30 DIAGNOSIS — M25.561 CHRONIC PAIN OF BOTH KNEES: ICD-10-CM

## 2023-01-30 DIAGNOSIS — M17.0 PRIMARY OSTEOARTHRITIS OF BOTH KNEES: ICD-10-CM

## 2023-01-30 DIAGNOSIS — M47.816 LUMBAR SPONDYLOSIS: ICD-10-CM

## 2023-01-30 DIAGNOSIS — G89.29 CHRONIC PAIN OF BOTH KNEES: ICD-10-CM

## 2023-01-30 DIAGNOSIS — G89.4 CHRONIC PAIN SYNDROME: ICD-10-CM

## 2023-01-30 DIAGNOSIS — M54.9 INTRACTABLE BACK PAIN: ICD-10-CM

## 2023-01-30 DIAGNOSIS — M51.36 DDD (DEGENERATIVE DISC DISEASE), LUMBAR: ICD-10-CM

## 2023-01-30 DIAGNOSIS — Z96.653 STATUS POST TOTAL BILATERAL KNEE REPLACEMENT: ICD-10-CM

## 2023-01-30 PROCEDURE — 99213 PR OFFICE/OUTPT VISIT, EST, LEVL III, 20-29 MIN: ICD-10-PCS | Mod: 95,,, | Performed by: NURSE PRACTITIONER

## 2023-01-30 PROCEDURE — 99213 OFFICE O/P EST LOW 20 MIN: CPT | Mod: 95,,, | Performed by: NURSE PRACTITIONER

## 2023-01-30 RX ORDER — OXYCODONE AND ACETAMINOPHEN 10; 325 MG/1; MG/1
1 TABLET ORAL EVERY 8 HOURS PRN
Qty: 90 TABLET | Refills: 0 | Status: SHIPPED | OUTPATIENT
Start: 2023-02-08 | End: 2023-03-09 | Stop reason: SDUPTHER

## 2023-01-30 NOTE — PROGRESS NOTES
Chronic patient Established Note (Follow up visit)  Chronic Pain-Tele-Medicine-Established Note (Follow up visit)        The patient location is: Home  The chief complaint leading to consultation is: pain  Visit type: Virtual visit with synchronous audio and video  Total time spent with patient: 25 min  Each patient to whom he or she provides medical services by telemedicine is:  (1) informed of the relationship between the physician and patient and the respective role of any other health care provider with respect to management of the patient; and (2) notified that he or she may decline to receive medical services by telemedicine and may withdraw from such care at any time.      SUBJECTIVE:    Interval History 1/30/2023:  The patient returns to clinic today for follow up of low back and knee pain. She is s/p right genicular RFA on 12/12/2022 and left genicular RFA on 1/9/2023. She reports 60% relief of her knee pain. She reports intermittent bilateral knee pain, this is tolerable at this time. She reports increased low back pain. Her pain is constant and aching in nature. Her leg pain is much improved. Her pain is worse with moving from sitting to standing. She does endorse morning stiffness. She continues to participate in physical therapy and a home exercise routine. She continues to take Percocet as needed with benefit and without adverse effects. She denies any other health changes.     Interval History 11/22/2022:  The patient returns to clinic today for follow up of low back and knee pain. She continues to report low back pain that radiates into the posterior aspect of both legs to under her feet. Her pain is worse with moving from sitting to standing. She also endorses morning stiffness. She recently started physical therapy. She has completed one session. She continues to report bilateral knee pain. She endorses worsening pain with the cold weather. Her pain is worse with standing and walking. She continues  to take Percocet as needed with benefit and without adverse effects. She denies any other health changes. Her pain today is 8/10.    Interval History 10/5/2022:  She returns for follow-up.  Her knees are doing well.  She has some discomfort but not enough to do procedures.  Her main complaint is lumbar spine pain for the past few weeks that is radiating to the dorsal aspect of both lower extremities.  She has no red flag signs/symptoms.  No new bowel or bladder symptomatology.  The pain radiates from the back down to the plantar aspect of both feet.  It is activity related.  Rest helps some but it does not resolve the pain.  She has not been in therapy for a while.  No recent imaging.  No recent weight changes.  Otherwise, no new symptomatology.  She goes regularly to her primary care physician for health maintenance.    Interval History 8/9/2022:  Alivia Thomas presents to the clinic for a follow-up appointment for low back and knee pain. She is s/p right genicular RFA on 5/9/2022 and left genicular RFA on 5/22/2022. She reports 60% relief of her knee pain. She also had panniculectomy on 6/14/2022. She is healing well. She continues to report intermittent low back pain, worse with prolonged activity. She denies any radicular leg pain. Her pain is worse prolonged standing and walking. She continues to perform a homoe exercise routine. She continues to take Percocet as needed with benefit and without adverse effects. She denies any other health changes. Her pain today is 7/10.    Interval History 4/9/2022:  The patient returns to clinic today for follow-up bilateral L3, 4, 5 RFA last month.  She reports that she is feeling very well following the procedure and reports 60-70% pain relief.  Today, she reports that her bilateral knee pain has continued to worsen, and she would like to go ahead and repeat bilateral genicular RFA as soon as possible.  She denies any new numbness, tingling, weakness, saddle anesthesia  or bowel/bladder incontinence.    Interval History 12/28/2021:  The patient returns to clinic today for follow up via virtual visit. She continues to report bilateral knee pain. This pain is worse with prolonged standing and walking. She continues to report low back and buttock pain. She denies any radicular leg pain. She continues to report a home exercise routine. She continues to report benefit with Percocet. She denies any adverse effects. She denies any other health changes.    Pain Disability Index Review:  Last 3 PDI Scores 11/22/2022 10/5/2022 4/19/2022   Pain Disability Index (PDI) 40 48 25       Pain Medications:  Percocet    Opioid Contract: yes     report:  Reviewed and consistent with medication use as prescribed.    Pain Procedures:   steroid inj and visco-supplementation (series of 3) in left knee- not helpful  3/31/14 Bilateral genicular nerve blocks  2/16/16 Bilateral L2,3,4,5 MBB  3/1/16 Bilateral L2,3,4,5 MBB   4/6/16 Left L2,3,4,5 RFA- significant relief  4/20/16 Right L2,3,4,5 RFA- significant relief  10/5/16 Left L2,3,4,5 cooled RFA  10/19/16 Right L2,3,4,5 cooled RFA  4/26/17 Left L2,3,4,5 cooled RFA  5/9/17 Right L2,3,4,5 cooled RFA  12/26/2017- Left L2,3,4,5 cooled RFA  1/9/2018- Right L2,3,4,5 cooled RFA  6/26/18 Left L2,3,4,5 cooled RFA- 60% relief  7/10/18 Right L2,3,4,5 cooled RFA- 60% relief  9/28/18 TPIs- significant relief  12/27/18 Left L2,3,4,5 RFA- 70% relief  1/29/19 Right L2,3,4,5 RFA- 70% relief  6/18/19 Left L2,3,4,5 RFA- 70% relief  7/9/19 Right L2,3,4,5 RFA- 50% relief  12/19/19 Left L2,3,4,5 RFA- 80% relief  12/31/19 Right L2,3,4,5 RFA- 50% relief  3/2/2020- Right genicular nerve block- 70% relief for one month  3/12/20 Left subacromial bursa injection- 90% relief  5/18/2020- Left genicular nerve block- 70%  6/9/2020- Bilateral SI joint injections- 50% relief  10/13/2020- Right genicular RFA- 50% relief   11/3/2020- Left genicular RFA- 50% relief  12/15/2020- Left  L2,3,4,5 RFA  12/29/2020- Right L2,3,4,5 RFA  4/26/2021- Left subacromial bursa'  5/11/2021- Bilateral SI joint injections   6/28/2021- Left genicular RFA  7/13/2021- Right genicular RFA  8/24/2021- Right L2,3,4,5 RFA  9/11/2021- Left L2,3,4,5 RFA  3/7/2022- Right L2,3,4,5 RFA  3/21/2022- Left L2,3,4,5 RFA  5/9/2022- Right genicular RFA  5/23/2022- Left genicular RFA  12/12/2022- Right genicular RFA  1/9/2023- Left genicular RFA    Physical Therapy/Home Exercise: yes    Imaging:   Xray Knee 3/6/2019:  FINDINGS:  Postop bilateral knee replacements.  The the the the irregularity about the left patella with soft tissue calcifications similar.  Small joint effusion.  Some irregularity of the right patella unchanged as well.     IMPRESSION:      Postop bilateral knee replacement with irregularity about the patella as above.    MRI Cervical Spine 4/24/2018:  FINDINGS:  There is reversal of the normal cervical lordosis which could be related to patient positioning versus muscle spasm.     Cervical vertebral body heights are well maintained without evidence of acute fracture or dislocation.  Marrow signal is unremarkable.     Spinal canal contents are unremarkable.  No abnormal masses or collections.     Individual levels as detailed below:     C2-3: No significant spinal canal stenosis or neuroforaminal narrowing.     C3-4: Facet arthropathy.  No significant spinal canal stenosis or neuroforaminal narrowing.     C4-5: Facet arthropathy.  Small broad-based disc osteophyte complex.  Mild right neuroforaminal narrowing.  Mild spinal canal stenosis.     C5-6: Facet arthropathy.  Uncovertebral joint spurring.  Broad-based posterior disc osteophyte complex.  Mild right neuroforaminal narrowing.  Mild spinal canal stenosis.     C6-7: Facet arthropathy.  No significant spinal canal stenosis or neuroforaminal narrowing.     C7-T1: No significant spinal canal stenosis or neuroforaminal narrowing.     Paraspinal muscles are  unremarkable.  Soft tissues are intact.     IMPRESSION:      Mild multilevel cervical spondylosis with mild spinal canal stenosis and mild right neuroforaminal narrowing at C4-5 and C5-6.    Xray Lumbar Spine 9/21/2015:  Results: AP, lateral neutral, lateral flexion , lateral extension, bilateral oblique and spot views.  The alignment of the lumbar spine demonstrates a mild levoscoliosis .  11-mm anterior listhesis of L4 relative to L5 with no translational abnormalities   seen on flexion and extension views.  The vertebral body heights  are well-maintained  , mild disk space narrowing L4-L5 and L5-S1.   Mild anterior and marginal osteophyte formation seen  throughout the lumbar spine  .  The oblique views demonstrate no   definite spondylolysis.  There is facet joint osseous hypertrophy noted at L3-L4 and L4-L5.  IMPRESSION:         The Significant spondylosis of the lumbar spine with grade 1 anterior listhesis L4 relative to L5.  Facet joint osseous hypertrophy L3-L4 and L4-5.    Allergies:   Review of patient's allergies indicates:   Allergen Reactions    Strawberry Anaphylaxis       Current Medications:   Current Outpatient Medications   Medication Sig Dispense Refill    albuterol (VENTOLIN HFA) 90 mcg/actuation inhaler INHALE TWO PUFFS INTO LUNGS EVERY 4 TO 6 HOURS AS NEEDED FOR SHORTNESS OF BREATH AND FOR WHEEZING 18 g 1    allopurinoL (ZYLOPRIM) 300 MG tablet Take 1 tablet (300 mg total) by mouth 2 (two) times daily. 180 tablet 3    atorvastatin (LIPITOR) 20 MG tablet TAKE 1 TABLET BY MOUTH ONCE DAILY. 30 tablet 3    b complex vitamins tablet Take 1 tablet by mouth every other day.       calcium citrate/vitamin D2 (ISIAH-CITRATE ORAL) Take 500 mg by mouth 2 (two) times daily.      colchicine (MITIGARE) 0.6 mg Cap Take 1 capsule (0.6 mg total) by mouth daily as needed (flare up). 60 capsule 3    cyanocobalamin (VITAMIN B-12) 1000 MCG tablet Take 100 mcg by mouth every morning.      diclofenac sodium (VOLTAREN) 1  % Gel Apply 2 g topically 4 (four) times daily as needed. To painful area on the feet. 500 g 5    FLUoxetine (PROZAC) 20 mg/5 mL (4 mg/mL) solution Take 5 mLs (20 mg total) by mouth once daily. 450 mL 3    fluticasone propionate (FLONASE) 50 mcg/actuation nasal spray 1 spray (50 mcg total) by Each Nostril route 2 (two) times daily as needed for Rhinitis. 15 g 6    indomethacin (INDOCIN) 50 MG capsule TAKE 1 CAPSULE BY MOUTH 2 TIMES DAILY WITH MEALS 30 capsule 2    LIDOcaine (XYLOCAINE) 5 % Oint ointment Apply topically as needed. 35.44 g 6    MULTIVIT-IRON-MIN-FOLIC ACID 3,500-18-0.4 UNIT-MG-MG ORAL CHEW Take 1 tablet by mouth 2 (two) times daily.       nystatin (MYCOSTATIN) powder Apply topically 2 (two) times daily. 60 g 3    omeprazole (PRILOSEC) 20 MG capsule TAKE 1 CAPSULE BY MOUTH EVERY MORNING. 30 capsule 3    oxybutynin (DITROPAN-XL) 5 MG TR24 Take 1 tablet (5 mg total) by mouth once daily. 90 tablet 3    oxyCODONE-acetaminophen (PERCOCET)  mg per tablet Take 1 tablet by mouth every 8 (eight) hours as needed for Pain. 90 tablet 0    vitamin D 185 MG Tab Take 5,000 mg by mouth every morning.        No current facility-administered medications for this visit.     Facility-Administered Medications Ordered in Other Visits   Medication Dose Route Frequency Provider Last Rate Last Admin    0.9%  NaCl infusion   Intravenous Continuous Lina Wagner MD 25 mL/hr at 12/15/20 1506 25 mL/hr at 12/15/20 1506       REVIEW OF SYSTEMS:    GENERAL:  No weight loss, malaise or fevers.  HEENT:  Negative for frequent or significant headaches.  NECK:  Negative for lumps, goiter, pain and significant neck swelling.  RESPIRATORY:  Negative for cough, wheezing or shortness of breath.  CARDIOVASCULAR:  Negative for chest pain, leg swelling or palpitations. HTN  GI:  Negative for abdominal discomfort, blood in stools or black stools or change in bowel habits. GERD  MUSCULOSKELETAL:  See HPI.  SKIN:  Negative for lesions,  rash, and itching.  PSYCH:  Negative for sleep disturbance, mood disorder and recent psychosocial stressors.  HEMATOLOGY/LYMPHOLOGY:  Negative for prolonged bleeding, bruising easily or swollen nodes.  NEURO:   No history of headaches, syncope, paralysis, seizures or tremors.  ENDO: Diabetes  All other reviewed and negative other than HPI.    Past Medical History:  Past Medical History:   Diagnosis Date    Allergy     Anemia     Asthma     Bilateral shoulder bursitis     Cervical stenosis of spine     Chronic pain     DDD (degenerative disc disease), cervical     Depression 1/28/2019    Diabetes mellitus type II     DJD (degenerative joint disease) of knee 6/19/2014    Facet arthritis of lumbar region 12/17/2015    Facet syndrome 12/17/2015    GERD (gastroesophageal reflux disease)     Heartburn     Hyperlipidemia     Hypertension     Morbid obesity     Neuromuscular disorder     PADDY (obstructive sleep apnea)     Proteinuria     Right carpal tunnel syndrome     Sacroiliitis 6/13/2018    Sacroiliitis     Sleep apnea        Past Surgical History:  Past Surgical History:   Procedure Laterality Date    CARPAL TUNNEL RELEASE      CARPAL TUNNEL RELEASE  1980s    left    CARPAL TUNNEL RELEASE  2012    right    COLONOSCOPY N/A 6/15/2020    Procedure: COLONOSCOPY;  Surgeon: Jc Koo MD;  Location: [facility] (4TH FLR);  Service: Endoscopy;  Laterality: N/A;  COVID screening on 6/13/20 at [facility]  pt updated on drop off location and no visitor [facility]-    ESOPHAGOGASTRODUODENOSCOPY N/A 3/27/2019    Procedure: EGD (ESOPHAGOGASTRODUODENOSCOPY);  Surgeon: Robbin Bar MD;  Location: [facility] (2ND FLR);  Service: Endoscopy;  Laterality: N/A;  BMI 61.3/2nd floor case/svn    INJECTION OF JOINT Bilateral 6/9/2020    Procedure: INJECTION, JOINT, BILATERAL SI;  Surgeon: Lina Wagner MD;  Location: [facility] PAIN MGT;  Service: Pain Management;  Laterality: Bilateral;  B/L SI Joint Injection    INJECTION OF JOINT  Bilateral 5/11/2021    Procedure: INJECTION, JOINT, SACROILIAC (SI) need consent;  Surgeon: Lina Wagner MD;  Location: [facility] PAIN MGT;  Service: Pain Management;  Laterality: Bilateral;    JOINT REPLACEMENT Bilateral     with 2 revisions on rt    KNEE SURGERY  3/2010    orthroscope    KNEE SURGERY  6-19-14    left TKR    LAPAROSCOPIC SLEEVE GASTRECTOMY N/A 8/28/2019    Procedure: GASTRECTOMY, SLEEVE, LAPAROSCOPIC with intraop EGD;  Surgeon: Heriberto Clements MD;  Location: [facility] OR 2ND FLR;  Service: General;  Laterality: N/A;    PANNICULECTOMY N/A 6/14/2022    Procedure: PANNICULECTOMY;  Surgeon: Rhett Gray MD;  Location: [facility] OR 2ND FLR;  Service: Plastics;  Laterality: N/A;    RADIOFREQUENCY ABLATION Right 7/9/2019    Procedure: RADIOFREQUENCY ABLATION;  Surgeon: Lian Wagner MD;  Location: [facility] PAIN MGT;  Service: Pain Management;  Laterality: Right;  RIGHT RFA L2,3,4,5  2 of 2    RADIOFREQUENCY ABLATION Left 12/19/2019    Procedure: RADIOFREQUENCY ABLATION, LEFT L2-L3-L4-L5 MEDIAL BRANCH 1 OF 2;  Surgeon: Lina Wagner MD;  Location: [facility] PAIN MGT;  Service: Pain Management;  Laterality: Left;    RADIOFREQUENCY ABLATION Right 12/31/2019    Procedure: RADIOFREQUENCY ABLATION, RIGHT L2-L3-L4-L5  2 OF 2;  Surgeon: Lina Wagner MD;  Location: [facility] PAIN MGT;  Service: Pain Management;  Laterality: Right;    RADIOFREQUENCY ABLATION Right 10/13/2020    Procedure: RADIOFREQUENCY ABLATION, Genicular Cooled 1 of 2;  Surgeon: Lina Wagner MD;  Location: [facility] PAIN MGT;  Service: Pain Management;  Laterality: Right;    RADIOFREQUENCY ABLATION Left 11/3/2020    Procedure: RADIOFREQUENCY ABLATION, GENICULAR COOLED  2 OF 2;  Surgeon: Lina Wagner MD;  Location: [facility] PAIN MGT;  Service: Pain Management;  Laterality: Left;    RADIOFREQUENCY ABLATION Left 12/15/2020    Procedure: RADIOFREQUENCY ABLATION LEFT L2,3,4,5 1 of 2;  Surgeon: Lina Wagner MD;  Location: [facility] PAIN  MGT;  Service: Pain Management;  Laterality: Left;    RADIOFREQUENCY ABLATION Right 12/29/2020    Procedure: RADIOFREQUENCY ABLATION RIGHT L2,3,4,5 2 of 2;  Surgeon: Lina Wagner MD;  Location: [facility] PAIN MGT;  Service: Pain Management;  Laterality: Right;    RADIOFREQUENCY ABLATION Left 6/29/2021    Procedure: RADIOFREQUENCY ABLATION GENICULAR COOLED;  Surgeon: Lina Wagner MD;  Location: [facility] PAIN MGT;  Service: Pain Management;  Laterality: Left;  1 of 2    RADIOFREQUENCY ABLATION Right 7/13/2021    Procedure: RADIOFREQUENCY ABLATION GENICULAR;  Surgeon: Lina Wagner MD;  Location: [facility] PAIN MGT;  Service: Pain Management;  Laterality: Right;  2 of 2    RADIOFREQUENCY ABLATION Right 8/24/2021    Procedure: RADIOFREQUENCY ABLATION, L2-L3-L4-L5 MEDIAL BRANCH 1 OF 2;  Surgeon: Lina Wagner MD;  Location: [facility] PAIN MGT;  Service: Pain Management;  Laterality: Right;    RADIOFREQUENCY ABLATION Left 9/11/2021    Procedure: RADIOFREQUENCY ABLATION, L2-L3-L4-L5 MEDIAL BR ANCH 2 OF 2;  Surgeon: Lina Wagner MD;  Location: [facility] PAIN MGT;  Service: Pain Management;  Laterality: Left;    RADIOFREQUENCY ABLATION Right 3/7/2022    Procedure: RADIOFREQUENCY ABLATION RIGHT L3,4,5 1 of 2, needs consent;  Surgeon: Israel Hurtado MD;  Location: [facility] PAIN MGT;  Service: Pain Management;  Laterality: Right;    RADIOFREQUENCY ABLATION Left 3/21/2022    Procedure: RADIOFREQUENCY ABLATION RIGHT L3,4,5 2 of 2, needs consent;  Surgeon: Israel Hurtado MD;  Location: [facility] PAIN MGT;  Service: Pain Management;  Laterality: Left;    RADIOFREQUENCY ABLATION Right 5/9/2022    Procedure: RADIOFREQUENCY ABLATION RIGHT GENICULAR COOLED 1 of 2;  Surgeon: Israel Hurtado MD;  Location: [facility] PAIN MGT;  Service: Pain Management;  Laterality: Right;    RADIOFREQUENCY ABLATION Left 5/23/2022    Procedure: RADIOFREQUENCY ABLATION LEFT GENICULAR COOLED  2 of 2;  Surgeon: Israel Hurtado MD;  Location: [facility] PAIN MGT;  Service: Pain  Management;  Laterality: Left;    RADIOFREQUENCY ABLATION Right 12/12/2022    Procedure: RADIOFREQUENCY ABLATION RIGHT GENICULAR COOLED  ONE OF TWO  NEEDS CONSENT;  Surgeon: Israel Hurtado MD;  Location: [facility] PAIN MGT;  Service: Pain Management;  Laterality: Right;    RADIOFREQUENCY ABLATION Left 1/9/2023    Procedure: RADIOFREQUENCY ABLATION LEFT GENICULAR COOLED  TWO OF TWO NEEDS CONSENT;  Surgeon: Israel Hurtado MD;  Location: [facility] PAIN MGT;  Service: Pain Management;  Laterality: Left;    RADIOFREQUENCY ABLATION OF LUMBAR MEDIAL BRANCH NERVE AT SINGLE LEVEL Left 6/26/2018    Procedure: RADIOFREQUENCY ABLATION, NERVE, MEDIAL BRANCH, LUMBAR, 1 LEVEL;  Surgeon: Lina Wagner MD;  Location: [facility] PAIN MGT;  Service: Pain Management;  Laterality: Left;  Left Cooled RFA @ L2,3,4,5  [id number]  with Sedation    1 of 2    RADIOFREQUENCY ABLATION OF LUMBAR MEDIAL BRANCH NERVE AT SINGLE LEVEL Right 7/10/2018    Procedure: RADIOFREQUENCY ABLATION, NERVE, MEDIAL BRANCH, LUMBAR, 1 LEVEL;  Surgeon: Lina Wagner MD;  Location: [facility] PAIN MGT;  Service: Pain Management;  Laterality: Right;  RIght Cooled RFA @ L2,3,4,5  [id number] with Sedation    2 of 2    TRIGGER FINGER RELEASE Right 2017    TRIGGER FINGER RELEASE Left 7/29/2019    Procedure: RELEASE, TRIGGER FINGER, Left Thumb;  Surgeon: Velma Garcia MD;  Location: [facility] OR;  Service: Orthopedics;  Laterality: Left;  Local w/ MAC       Family History:  Family History   Problem Relation Age of Onset    Diabetes Mother     Cataracts Mother     Diabetes Father     Cataracts Father     Hypertension Sister     Diabetes Sister     No Known Problems Sister     Cancer Sister         lymphoma     Coronary artery disease Brother     Diabetes Brother     Diabetes Brother     Hypotension Brother     Kidney failure Brother     Hypotension Brother     Amblyopia Neg Hx     Blindness Neg Hx     Glaucoma Neg Hx     Macular degeneration Neg Hx     Retinal detachment Neg Hx      Strabismus Neg Hx     Stroke Neg Hx     Thyroid disease Neg Hx     Anesthesia problems Neg Hx        Social History:  Social History     Socioeconomic History    Marital status:    Tobacco Use    Smoking status: Never    Smokeless tobacco: Never   Substance and Sexual Activity    Alcohol use: Yes     Comment: occasionally     Drug use: No    Sexual activity: Yes     Partners: Female     Birth control/protection: None   Social History Narrative    Disabled. The patient is the youngest of 6 siblings. Single. Lives with single-sex partner.                    OBJECTIVE:    Exam limited due to virtual visit:  GENERAL: Well appearing, in no acute distress, alert and oriented x3.   PSYCH:  Mood and affect appropriate.    Previous physical exam:  [facility] 06/26/2018     PHYSICAL EXAMINATION:    GENERAL: Well appearing, in no acute distress, alert and oriented x3.   PSYCH:  Mood and affect appropriate.  SKIN: Skin color, texture, turgor normal, no rashes or lesions.   HEAD/FACE:  Normocephalic, atraumatic.   CV: RRR with palpation of the radial artery.  PULM: No evidence of respiratory difficulty, symmetric chest rise.  BACK: Negative SLR bilaterally. SLR does cause back pain. There is pain with palpation over lumbar facet joints bilaterally. Limited ROM with pain on extension.  Positive facet loading bilaterally  EXTREMITIES: There is pain with palpation to bilateral SI joints.  JENNA is positive bilaterally. Well-healed midline scars to bilateral knees.  There is pain with extension of bilateral knees.    MUSCULOSKELETAL: Bilateral upper and lower extremity strength is normal and symmetric.  No atrophy or tone abnormalities are noted.  NEURO: Bilateral lower extremity coordination and muscle stretch reflexes are physiologic and symmetric.  Plantar response are downgoing. No clonus.  No loss of sensation is noted.  GAIT: Antalgic- ambulates without assistance.      ASSESSMENT: 57 y.o. year old female with low back and  knee pain, consistent with the followin. Spondylosis of lumbar region without myelopathy or radiculopathy        2. Intractable back pain        3. DDD (degenerative disc disease), lumbar        4. Primary osteoarthritis of both knees        5. Status post total bilateral knee replacement        6. Chronic pain of both knees        7. Chronic pain disorder              PLAN:     - Previous imaging reviewed today.    - She is s/p bilateral genicular RFA with benefit. We can repeat this as needed.     - Schedule for right then left L3,4,5 RFA, one side at a time, two weeks apart. Previous RFA provided 70% relief for 9 months.     - Continue Percocet 10/325 mg TID PRN. Refill provided with appropriate date.     - The patient is here today for a refill of current pain medications and they believe these provide effective pain control and improvements in quality of life.  The patient notes no serious side effects, and feels the benefits outweigh the risks.  The patient was reminded of the pain contract that they signed previously as well as the risks and benefits of the medication including possible death.  The updated [place] Board [employer] prescription monitoring program was reviewed, and the patient has been found to be compliant with current treatment plan. Medication management provided by Dr. Hurtado.     - Previous UDS from 2022 reviewed and consistent.     - I have stressed the importance of physical activity and a home exercise plan to help with pain and improve health.    - Continue physical therapy.     - RTC 3 weeks after above procedures.     - Counseled patient regarding the importance of activity modification and constant sleeping habits.    The above plan and management options were discussed at length with patient. Patient is in agreement with the above and verbalized understanding.    Adeola Robledo  2023

## 2023-02-09 ENCOUNTER — PATIENT MESSAGE (OUTPATIENT)
Dept: BARIATRICS | Facility: CLINIC | Age: 58
End: 2023-02-09
Payer: MEDICARE

## 2023-02-14 ENCOUNTER — PATIENT MESSAGE (OUTPATIENT)
Dept: BARIATRICS | Facility: CLINIC | Age: 58
End: 2023-02-14
Payer: MEDICARE

## 2023-02-22 ENCOUNTER — PATIENT MESSAGE (OUTPATIENT)
Dept: BARIATRICS | Facility: CLINIC | Age: 58
End: 2023-02-22
Payer: MEDICARE

## 2023-02-28 ENCOUNTER — TELEPHONE (OUTPATIENT)
Dept: BARIATRICS | Facility: CLINIC | Age: 58
End: 2023-02-28
Payer: MEDICARE

## 2023-03-06 ENCOUNTER — HOSPITAL ENCOUNTER (OUTPATIENT)
Facility: OTHER | Age: 58
Discharge: HOME | End: 2023-03-06
Attending: ANESTHESIOLOGY | Admitting: ANESTHESIOLOGY
Payer: MEDICARE

## 2023-03-06 VITALS
HEART RATE: 60 BPM | OXYGEN SATURATION: 96 % | TEMPERATURE: 98 F | WEIGHT: 240 LBS | BODY MASS INDEX: 39.99 KG/M2 | DIASTOLIC BLOOD PRESSURE: 76 MMHG | SYSTOLIC BLOOD PRESSURE: 132 MMHG | RESPIRATION RATE: 16 BRPM | HEIGHT: 65 IN

## 2023-03-06 DIAGNOSIS — G89.29 CHRONIC PAIN: ICD-10-CM

## 2023-03-06 DIAGNOSIS — M47.817 SPONDYLOSIS OF LUMBOSACRAL REGION WITHOUT MYELOPATHY OR RADICULOPATHY: Primary | ICD-10-CM

## 2023-03-06 LAB — POCT GLUCOSE: 96 MG/DL (ref 70–110)

## 2023-03-06 PROCEDURE — 25000003 PHARM REV CODE 250: Performed by: STUDENT IN AN ORGANIZED HEALTH CARE EDUCATION/TRAINING PROGRAM

## 2023-03-06 PROCEDURE — 64635 DESTROY LUMB/SAC FACET JNT: CPT | Mod: RT | Performed by: ANESTHESIOLOGY

## 2023-03-06 PROCEDURE — 64636 DESTROY L/S FACET JNT ADDL: CPT | Mod: RT,,, | Performed by: ANESTHESIOLOGY

## 2023-03-06 PROCEDURE — 99152 MOD SED SAME PHYS/QHP 5/>YRS: CPT | Performed by: ANESTHESIOLOGY

## 2023-03-06 PROCEDURE — 64636 DESTROY L/S FACET JNT ADDL: CPT | Mod: RT | Performed by: ANESTHESIOLOGY

## 2023-03-06 PROCEDURE — 25000003 PHARM REV CODE 250: Performed by: ANESTHESIOLOGY

## 2023-03-06 PROCEDURE — 82947 ASSAY GLUCOSE BLOOD QUANT: CPT | Performed by: ANESTHESIOLOGY

## 2023-03-06 PROCEDURE — 64635 DESTROY LUMB/SAC FACET JNT: CPT | Mod: RT,,, | Performed by: ANESTHESIOLOGY

## 2023-03-06 PROCEDURE — 63600175 PHARM REV CODE 636 W HCPCS: Performed by: ANESTHESIOLOGY

## 2023-03-06 PROCEDURE — 64636 PR DESTROY L/S FACET JNT ADDL: ICD-10-PCS | Mod: RT,,, | Performed by: ANESTHESIOLOGY

## 2023-03-06 PROCEDURE — 99152 PR MOD CONSCIOUS SEDATION, SAME PHYS, 5+ YRS, FIRST 15 MIN: ICD-10-PCS | Mod: ,,, | Performed by: ANESTHESIOLOGY

## 2023-03-06 PROCEDURE — 99152 MOD SED SAME PHYS/QHP 5/>YRS: CPT | Mod: ,,, | Performed by: ANESTHESIOLOGY

## 2023-03-06 PROCEDURE — 64635 PR DESTROY LUMB/SAC FACET JNT: ICD-10-PCS | Mod: RT,,, | Performed by: ANESTHESIOLOGY

## 2023-03-06 RX ORDER — FENTANYL CITRATE 50 UG/ML
INJECTION, SOLUTION INTRAMUSCULAR; INTRAVENOUS
Status: DISCONTINUED | OUTPATIENT
Start: 2023-03-06 | End: 2023-03-06 | Stop reason: HOSPADM

## 2023-03-06 RX ORDER — BUPIVACAINE HYDROCHLORIDE 2.5 MG/ML
INJECTION, SOLUTION EPIDURAL; INFILTRATION; INTRACAUDAL
Status: DISCONTINUED | OUTPATIENT
Start: 2023-03-06 | End: 2023-03-06 | Stop reason: HOSPADM

## 2023-03-06 RX ORDER — DEXAMETHASONE SODIUM PHOSPHATE 10 MG/ML
INJECTION INTRAMUSCULAR; INTRAVENOUS
Status: DISCONTINUED | OUTPATIENT
Start: 2023-03-06 | End: 2023-03-06 | Stop reason: HOSPADM

## 2023-03-06 RX ORDER — SODIUM CHLORIDE 9 MG/ML
500 INJECTION, SOLUTION INTRAVENOUS CONTINUOUS
Status: DISCONTINUED | OUTPATIENT
Start: 2023-03-06 | End: 2023-03-06 | Stop reason: HOSPADM

## 2023-03-06 RX ORDER — LIDOCAINE HYDROCHLORIDE 20 MG/ML
INJECTION, SOLUTION INFILTRATION; PERINEURAL
Status: DISCONTINUED | OUTPATIENT
Start: 2023-03-06 | End: 2023-03-06 | Stop reason: HOSPADM

## 2023-03-06 RX ORDER — MIDAZOLAM HYDROCHLORIDE 1 MG/ML
INJECTION INTRAMUSCULAR; INTRAVENOUS
Status: DISCONTINUED | OUTPATIENT
Start: 2023-03-06 | End: 2023-03-06 | Stop reason: HOSPADM

## 2023-03-06 NOTE — OP NOTE
Therapeutic Lumbar Medial Branch Radiofrequency Ablation under Fluoroscopy     The procedure, risks, benefits, and options were discussed with the patient. There are no contraindications to the procedure. The patent expressed understanding and agreed to the procedure. Informed written consent was obtained prior to the start of the procedure and can be found in the patient's chart.        PATIENT NAME: Alivia Thomas   MRN: [id number]     DATE OF PROCEDURE: 03/06/2023     PROCEDURE:  Right L3, L4, and L5 Lumbar Radiofrequency Ablation under Fluoroscopy    PRE-OP DIAGNOSIS: Spondylosis of lumbar region without myelopathy or radiculopathy [M47.816] Lumbar spondylosis [M47.816]    POST-OP DIAGNOSIS: Same    PHYSICIAN: Israel Hurtado MD    ASSISTANTS: William Ferrera D.O. ([facility] Fellow) Zoya Magana DO Anesthesia resident      MEDICATIONS INJECTED:  Preservative-free Decadron 10mg with 9cc of Bupivicaine 0.25%    LOCAL ANESTHETIC INJECTED:   Xylocaine 2%    SEDATION: Versed 2mg and Fentanyl 25mcg                                                                                                                                                                                     Conscious sedation ordered by M.D. Patient re-evaluation prior to administration of conscious sedation. No changes noted in patient's status from initial evaluation. The patient's vital signs were monitored by RN and patient remained hemodynamically stable throughout the procedure.    Event Time In   Sedation Start 0947   Sedation End 1006       ESTIMATED BLOOD LOSS:  None    COMPLICATIONS:  None     INTERVAL HISTORY: Patient has clinical and imaging findings suggestive of facet mediated pain. Patients has completed 2 previous diagnostic medial branch blocks at specified levels with at least 80% relief for the expected duration of the local anesthetic utilized.    TECHNIQUE: Time-out was performed to identify the patient and procedure to be  performed. With the patient laying in a prone position, the surgical area was prepped and draped in the usual sterile fashion using ChloraPrep and fenestrated drape. The levels were determined under fluoroscopic guidance. Skin anesthesia was achieved by injecting Lidocaine 2% over the injection sites. A 20 gauge 10mm curved active tip needle was introduced to the anatomic local of the medial branch at each of the above levels using AP, lateral and/or contralateral oblique fluoroscopic imaging. Then sensory and motor testing was performed to confirm that the needle tips were in the correct location. After negative aspiration for blood or CSF was confirmed, 1 mL of the lidocaine 2% listed above was injected slowly at each site. This was followed by thermal lesioning at 80 degrees celsius for 90 seconds. That was followed by slowly injecting 1.5 mL of the medication mixture listed above at each site. The needles were removed and bleeding was nil. A sterile dressing was applied. No specimens collected. The patient tolerated the procedure well and did not have any procedure related motor deficit at the conclusion of the procedure.  PAIN BEFORE THE PROCEDURE: 8/10    PAIN AFTER THE PROCEDURE: 0/10   The patient was monitored after the procedure in the recovery area. They were given post-procedure and discharge instructions to follow at home. The patient was discharged in a stable condition.        Israel Hurtado MD

## 2023-03-06 NOTE — DISCHARGE SUMMARY
Discharge Note  Short Stay      SUMMARY     Admit Date: 3/6/2023    Attending Physician: Israel Hurtado      Discharge Physician: Israel Hurtado      Discharge Date: 3/6/2023 10:07 AM    Procedure(s) (LRB):  RADIOFREQUENCY ABLATION RIGHT L3,L4,L5 (Right)    Final Diagnosis: Spondylosis of lumbar region without myelopathy or radiculopathy [M47.816]    Disposition: Home or self care    Patient Instructions:   Current Discharge Medication List        CONTINUE these medications which have NOT CHANGED    Details   albuterol (VENTOLIN HFA) 90 mcg/actuation inhaler INHALE TWO PUFFS INTO LUNGS EVERY 4 TO 6 HOURS AS NEEDED FOR SHORTNESS OF BREATH AND FOR WHEEZING  Qty: 18 g, Refills: 1    Comments: Please consider 90 day supplies to promote better adherence  Associated Diagnoses: Asthma, well controlled, mild intermittent      allopurinoL (ZYLOPRIM) 300 MG tablet Take 1 tablet (300 mg total) by mouth 2 (two) times daily.  Qty: 180 tablet, Refills: 3    Associated Diagnoses: Gout, unspecified cause, unspecified chronicity, unspecified site      atorvastatin (LIPITOR) 20 MG tablet TAKE 1 TABLET BY MOUTH ONCE DAILY.  Qty: 30 tablet, Refills: 3      b complex vitamins tablet Take 1 tablet by mouth every other day.       calcium citrate/vitamin D2 (ISIAH-CITRATE ORAL) Take 500 mg by mouth 2 (two) times daily.      colchicine (MITIGARE) 0.6 mg Cap Take 1 capsule (0.6 mg total) by mouth daily as needed (flare up).  Qty: 60 capsule, Refills: 3    Associated Diagnoses: Gout, unspecified cause, unspecified chronicity, unspecified site      cyanocobalamin (VITAMIN B-12) 1000 MCG tablet Take 100 mcg by mouth every morning.      diclofenac sodium (VOLTAREN) 1 % Gel Apply 2 g topically 4 (four) times daily as needed. To painful area on the feet.  Qty: 500 g, Refills: 5    Comments: 5 x 100g tubes  Associated Diagnoses: Right foot pain; Enthesopathy of foot      FLUoxetine (PROZAC) 20 mg/5 mL (4 mg/mL) solution Take 5 mLs (20 mg total) by mouth  once daily.  Qty: 450 mL, Refills: 3      fluticasone propionate (FLONASE) 50 mcg/actuation nasal spray 1 spray (50 mcg total) by Each Nostril route 2 (two) times daily as needed for Rhinitis.  Qty: 15 g, Refills: 6      indomethacin (INDOCIN) 50 MG capsule TAKE 1 CAPSULE BY MOUTH 2 TIMES DAILY WITH MEALS  Qty: 30 capsule, Refills: 2    Associated Diagnoses: Right foot pain; Enthesopathy of foot      LIDOcaine (XYLOCAINE) 5 % Oint ointment Apply topically as needed.  Qty: 35.44 g, Refills: 6    Associated Diagnoses: Right foot pain      MULTIVIT-IRON-MIN-FOLIC ACID 3,500-18-0.4 UNIT-MG-MG ORAL CHEW Take 1 tablet by mouth 2 (two) times daily.       nystatin (MYCOSTATIN) powder Apply topically 2 (two) times daily.  Qty: 60 g, Refills: 3      omeprazole (PRILOSEC) 20 MG capsule TAKE 1 CAPSULE BY MOUTH EVERY MORNING.  Qty: 30 capsule, Refills: 3    Associated Diagnoses: Gastroesophageal reflux disease without esophagitis      oxybutynin (DITROPAN-XL) 5 MG TR24 Take 1 tablet (5 mg total) by mouth once daily.  Qty: 90 tablet, Refills: 3    Associated Diagnoses: Mixed incontinence urge and stress (male)(female)      oxyCODONE-acetaminophen (PERCOCET)  mg per tablet Take 1 tablet by mouth every 8 (eight) hours as needed for Pain.  Qty: 90 tablet, Refills: 0    Comments: Quantity prescribed more than 7 day supply? Yes, quantity medically necessary  Associated Diagnoses: Lumbar spondylosis; Chronic pain syndrome      vitamin D 185 MG Tab Take 5,000 mg by mouth every morning.                  Discharge Diagnosis: Spondylosis of lumbar region without myelopathy or radiculopathy [M47.816]  Condition on Discharge: Stable with no complications to procedure   Diet on Discharge: Same as before.  Activity: as per instruction sheet.  Discharge to: Home with a responsible adult.  Follow up: 2-4 weeks       Please call my office or pager at [phone] if experienced any weakness or loss of sensation, fever > 101.5, pain  uncontrolled with oral medications, persistent nausea/vomiting/or diarrhea, redness or drainage from the incisions, or any other worrisome concerns. If physician on call was not reached or could not communicate with our office for any reason please go to the nearest emergency department

## 2023-03-06 NOTE — DISCHARGE INSTRUCTIONS

## 2023-03-06 NOTE — H&P
HPI  Alivia Marie Cyr  56 yo female presenting for RADIOFREQUENCY ABLATION RIGHT L3,L4,L5 (Right)          Past Medical History:   Diagnosis Date    Allergy     Anemia     Asthma     Bilateral shoulder bursitis     Cervical stenosis of spine     Chronic pain     DDD (degenerative disc disease), cervical     Depression 1/28/2019    Diabetes mellitus type II     DJD (degenerative joint disease) of knee 6/19/2014    Facet arthritis of lumbar region 12/17/2015    Facet syndrome 12/17/2015    GERD (gastroesophageal reflux disease)     Heartburn     Hyperlipidemia     Hypertension     Morbid obesity     Neuromuscular disorder     PADDY (obstructive sleep apnea)     Proteinuria     Right carpal tunnel syndrome     Sacroiliitis 6/13/2018    Sacroiliitis     Sleep apnea      Past Surgical History:   Procedure Laterality Date    CARPAL TUNNEL RELEASE      CARPAL TUNNEL RELEASE  1980s    left    CARPAL TUNNEL RELEASE  2012    right    COLONOSCOPY N/A 6/15/2020    Procedure: COLONOSCOPY;  Surgeon: Jc Koo MD;  Location: [facility] (4TH FLR);  Service: Endoscopy;  Laterality: N/A;  COVID screening on 6/13/20 at [facility]  pt updated on drop off location and no visitor [facility]    ESOPHAGOGASTRODUODENOSCOPY N/A 3/27/2019    Procedure: EGD (ESOPHAGOGASTRODUODENOSCOPY);  Surgeon: Robbin Bar MD;  Location: [facility] (2ND FLR);  Service: Endoscopy;  Laterality: N/A;  BMI 61.3/2nd floor case/svn    INJECTION OF JOINT Bilateral 6/9/2020    Procedure: INJECTION, JOINT, BILATERAL SI;  Surgeon: Lina Wagner MD;  Location: [facility] PAIN T;  Service: Pain Management;  Laterality: Bilateral;  B/L SI Joint Injection    INJECTION OF JOINT Bilateral 5/11/2021    Procedure: INJECTION, JOINT, SACROILIAC (SI) need consent;  Surgeon: Lina Wagner MD;  [facility];  Service: Pain Management;  Laterality: Bilateral;    JOINT REPLACEMENT Bilateral     with 2 revisions on rt    KNEE SURGERY  3/2010     orthroscope    KNEE SURGERY  6-19-14    left TKR    LAPAROSCOPIC SLEEVE GASTRECTOMY N/A 8/28/2019    Procedure: GASTRECTOMY, SLEEVE, LAPAROSCOPIC with intraop EGD;  Surgeon: Heriberto Clements MD;  Location: [facility] OR 2ND FLR;  Service: General;  Laterality: N/A;    PANNICULECTOMY N/A 6/14/2022    Procedure: PANNICULECTOMY;  Surgeon: Rhett Gray MD;  Location: [facility] OR 2ND FLR;  Service: Plastics;  Laterality: N/A;    RADIOFREQUENCY ABLATION Right 7/9/2019    Procedure: RADIOFREQUENCY ABLATION;  Surgeon: Lina Wagner MD;  Location: [facility] [place]GT;  Service: Pain Management;  Laterality: Right;  RIGHT RFA L2,3,4,5  2 of 2    RADIOFREQUENCY ABLATION Left 12/19/2019    Procedure: RADIOFREQUENCY ABLATION, LEFT L2-L3-L4-L5 MEDIAL BRANCH 1 OF 2;  Surgeon: Lina Wagner MD;  Location: [facility] [place];  Service: Pain Management;  Laterality: Left;    RADIOFREQUENCY ABLATION Right 12/31/2019    Procedure: RADIOFREQUENCY ABLATION, RIGHT L2-L3-L4-L5  2 OF 2;  Surgeon: Lina Wagner MD;  Location: [facility] [place]IN [facility]GT;  Service: Pain Management;  Laterality: Right;    RADIOFREQUENCY ABLATION Right 10/13/2020    Procedure: RADIOFREQUENCY ABLATION, Genicular Cooled 1 of 2;  Surgeon: Lina Wagner MD;  Location: [facility] PAIN MGT;  Service: Pain Management;  Laterality: Right;    RADIOFREQUENCY ABLATION Left 11/3/2020    Procedure: RADIOFREQUENCY ABLATION, GENICULAR COOLED  2 OF 2;  Surgeon: Lina Wagner MD;  Location: [facility] [place]IN [facility];  Service: Pain Management;  Laterality: Left;    RADIOFREQUENCY ABLATION Left 12/15/2020    Procedure: RADIOFREQUENCY ABLATION LEFT L2,3,4,5 1 of 2;  Surgeon: Lina Wagner MD;  Location: [facility] [place]IN [facility]GT;  Service: Pain Management;  Laterality: Left;    RADIOFREQUENCY ABLATION Right 12/29/2020    Procedure: RADIOFREQUENCY ABLATION RIGHT L2,3,4,5 2 of 2;  Surgeon: Lina Wagner MD;  Location: [facility] [place];  Service: Pain Management;  Laterality: Right;     RADIOFREQUENCY ABLATION Left 6/29/2021    Procedure: RADIOFREQUENCY ABLATION GENICULAR COOLED;  Surgeon: Lina Wagner MD;  Location: [facility] PAIN MGT;  Service: Pain Management;  Laterality: Left;  1 of 2    RADIOFREQUENCY ABLATION Right 7/13/2021    Procedure: RADIOFREQUENCY ABLATION GENICULAR;  Surgeon: Lina Wagner MD;  Location: [facility] PAIN MGT;  Service: Pain Management;  Laterality: Right;  2 of 2    RADIOFREQUENCY ABLATION Right 8/24/2021    Procedure: RADIOFREQUENCY ABLATION, L2-L3-L4-L5 MEDIAL BRANCH 1 OF 2;  Surgeon: Lina Wagner MD;  Location: [facility] PAIN MGT;  Service: Pain Management;  Laterality: Right;    RADIOFREQUENCY ABLATION Left 9/11/2021    Procedure: RADIOFREQUENCY ABLATION, L2-L3-L4-L5 MEDIAL BR ANCH 2 OF 2;  Surgeon: Lina Wagner MD;  Location: [facility] PAIN MGT;  Service: Pain Management;  Laterality: Left;    RADIOFREQUENCY ABLATION Right 3/7/2022    Procedure: RADIOFREQUENCY ABLATION RIGHT L3,4,5 1 of 2, needs consent;  Surgeon: Israel Hurtado MD;  Location: [facility]IN [facility]GT;  Service: Pain Management;  Laterality: Right;    RADIOFREQUENCY ABLATION Left 3/21/2022    Procedure: RADIOFREQUENCY ABLATION RIGHT L3,4,5 2 of 2, needs consent;  Surgeon: Israel Hurtado MD;  Location: [facility]GT;  Service: Pain Management;  Laterality: Left;    RADIOFREQUENCY ABLATION Right 5/9/2022    Procedure: RADIOFREQUENCY ABLATION RIGHT GENICULAR COOLED 1 of 2;  Surgeon: Israel Hurtado MD;  Location: [facility];  Service: Pain Management;  Laterality: Right;    RADIOFREQUENCY ABLATION Left 5/23/2022    Procedure: RADIOFREQUENCY ABLATION LEFT GENICULAR COOLED  2 of 2;  Surgeon: Israel Hurtado MD;  Location: [facility];  Service: Pain Management;  Laterality: Left;    RADIOFREQUENCY ABLATION Right 12/12/2022    Procedure: RADIOFREQUENCY ABLATION RIGHT GENICULAR COOLED  ONE OF TWO  NEEDS CONSENT;  Surgeon: Israel Hurtado MD;  Location: [facility];  Service: Pain Management;  Laterality: Right;     "RADIOFREQUENCY ABLATION Left 1/9/2023    Procedure: RADIOFREQUENCY ABLATION LEFT GENICULAR COOLED  TWO OF TWO NEEDS CONSENT;  Surgeon: Israel Hurtado MD;  Location: [facility] PAIN MGT;  Service: Pain Management;  Laterality: Left;    RADIOFREQUENCY ABLATION OF LUMBAR MEDIAL BRANCH NERVE AT SINGLE LEVEL Left 6/26/2018    Procedure: RADIOFREQUENCY ABLATION, NERVE, MEDIAL BRANCH, LUMBAR, 1 LEVEL;  Surgeon: Lina Wagner MD;  Location: [facility] PAIN MGT;  Service: Pain Management;  Laterality: Left;  Left Cooled RFA @ L2,3,4,5  [address]-67280  with Sedation    1 of 2    RADIOFREQUENCY ABLATION OF LUMBAR MEDIAL BRANCH NERVE AT SINGLE LEVEL Right 7/10/2018    Procedure: RADIOFREQUENCY ABLATION, NERVE, MEDIAL BRANCH, LUMBAR, 1 LEVEL;  Surgeon: Lina Wagner MD;  Location: [facility] PAIN MGT;  Service: Pain Management;  Laterality: Right;  RIght Cooled RFA @ L2,3,4,5  86[address]-46857 with Sedation    2 of 2    TRIGGER FINGER RELEASE Right 2017    TRIGGER FINGER RELEASE Left 7/29/2019    Procedure: RELEASE, TRIGGER FINGER, Left Thumb;  Surgeon: Velma Garcia MD;  Location: [facility] OR;  Service: Orthopedics;  Laterality: Left;  Local w/ MAC     Review of patient's allergies indicates:   Allergen Reactions    Strawberry Anaphylaxis        PMHx, PSHx, Allergies, Medications reviewed in epic      ROS negative except pain complaints in HPI    OBJECTIVE:    /79   Pulse (!) 58   Temp 98.1 °F (36.7 °C) (Oral)   Resp 16   Ht 5' 5" (1.651 m)   Wt 108.9 kg (240 lb)   LMP 06/26/2018   SpO2 97%   BMI 39.94 kg/m²     PHYSICAL EXAMINATION:    GENERAL: Well appearing, in no acute distress, alert and oriented x3.  PSYCH:  Mood and affect appropriate.  SKIN: Skin color, texture, turgor normal, no rashes or lesions.  CV: RRR with palpation of the radial artery.  PULM: No evidence of respiratory difficulty, symmetric chest rise. Clear to auscultation.  NEURO: Cranial nerves grossly intact.    Plan:    Proceed with procedure as " planned    Zoya Magana  03/06/2023

## 2023-03-08 ENCOUNTER — PATIENT MESSAGE (OUTPATIENT)
Dept: BARIATRICS | Facility: CLINIC | Age: 58
End: 2023-03-08
Payer: MEDICARE

## 2023-03-09 DIAGNOSIS — M47.816 LUMBAR SPONDYLOSIS: ICD-10-CM

## 2023-03-09 DIAGNOSIS — G89.4 CHRONIC PAIN SYNDROME: ICD-10-CM

## 2023-03-09 NOTE — TELEPHONE ENCOUNTER
Patient requesting refill on oxycodone 10 mg   Last office visit 1/30/23   shows last refill on 2/10/23  Patient does have a pain contract on file with [facility] department  Patient last UDS 11/22/22was consistent with current therapy     CODEINE  Not Detected  Not Detected   Not Detected   Not Detected  Not Detected    MORPHINE  Not Detected  Not Detected   Not Detected   Not Detected  Not Detected    6-ACETYLMORPHINE  Not Detected  Not Detected   Not Detected   Not Detected  Not Detected    OXYCODONE  Present  Present   Not Detected   Not Detected  Present    NOROYXCODONE  Present  Present   Present   Present  Present    OXYMORPHONE  Present  Present   Not Detected   Not Detected  Not Detected    NOROXYMORPHONE  Not Detected  Not Detected   Not Detected   Not Detected  Not Detected

## 2023-03-09 NOTE — TELEPHONE ENCOUNTER
----- Message from Vijaya Mccann sent at 3/9/2023  2:54 PM CST -----  Regarding: REFILL  .Type: RX Refill Request    Who Called: DONTAE MOON [[id number]]    Have you contacted your pharmacy: YES    Refill or New Rx: REFILL     RX Name and Strength: oxyCODONE-acetaminophen (PERCOCET)  mg per tablet 90 tablet     How is the patient currently taking it? (ex. 1XDay): 1X A DAY    Is this a 30 day or 90 day RX: 90 DAY    Preferred Pharmacy with phone number:[facility] - [address] [place] [employer]    Local or Mail Order: LOCAL    Ordering Provider: ALISE DOMINGO    Would the patient rather a call back or a response via [facility]?  CALL BACK    Best Call Back Number: [phone]    Additional Information:  PT stated she would like an call when the medication is sent to the pharmacy.Please contact to further discuss and advise.

## 2023-03-10 DIAGNOSIS — G89.4 CHRONIC PAIN SYNDROME: ICD-10-CM

## 2023-03-10 DIAGNOSIS — M47.816 LUMBAR SPONDYLOSIS: ICD-10-CM

## 2023-03-10 RX ORDER — OXYCODONE AND ACETAMINOPHEN 10; 325 MG/1; MG/1
1 TABLET ORAL EVERY 8 HOURS PRN
Qty: 90 TABLET | Refills: 0 | OUTPATIENT
Start: 2023-03-10 | End: 2023-04-09

## 2023-03-10 NOTE — TELEPHONE ENCOUNTER
Contacted and spoke to patient, she is reporting that she called a few days ago and it hasn't been filled and she is out as of today.     She was informed the medication was pended to Dr. Hurtado and pending his authorization. Unfortunately he is not in on Fridays and another provider royal not fill the medication as he is not absent due to reason.     Ms. Shearer verbalized understanding and feels she provided the office the standard notification they require so she doesn't run out.     She was informed we will ask [employer] to reach out to physician to see if he has the bandwidth to approve the prescription, she was also informed there is no guarantee it will be approved today.

## 2023-03-10 NOTE — TELEPHONE ENCOUNTER
Staff spoke with patient and she is requesting to speak with Adeola or monae before they leave today due to her medication was submitted to  on yesterday and he left without sighing the medication before 4 pm. Pt has been informed he doesn't return until Monday.

## 2023-03-10 NOTE — TELEPHONE ENCOUNTER
----- Message from Angela Forrester sent at 3/10/2023  1:57 PM CST -----  Regarding: Medication  Refill  Request                    Reply in [facility]:  NO      Please refill the medication(s) listed below. Please call the patient when the prescription(s) is ready for  at this phone number   [phone] ([phone])      Medication      oxyCODONE-acetaminophen (PERCOCET)  mg per tablet                          Sig - Route: Take 1 tablet by mouth every 8 (eight) hours as needed for Pain. - Oral                          Dispense:  90 tablets            Preferred Pharmacy:   [facility] [address]   Phone: [phone]  Fax:  [phone]

## 2023-03-11 ENCOUNTER — PATIENT MESSAGE (OUTPATIENT)
Dept: PAIN MEDICINE | Facility: OTHER | Age: 58
End: 2023-03-11
Payer: MEDICARE

## 2023-03-13 ENCOUNTER — PATIENT MESSAGE (OUTPATIENT)
Dept: ADMINISTRATIVE | Facility: HOSPITAL | Age: 58
End: 2023-03-13
Payer: MEDICARE

## 2023-03-13 ENCOUNTER — LAB VISIT (OUTPATIENT)
Dept: LAB | Facility: HOSPITAL | Age: 58
End: 2023-03-13
Attending: INTERNAL MEDICINE
Payer: MEDICARE

## 2023-03-13 ENCOUNTER — OFFICE VISIT (OUTPATIENT)
Dept: INTERNAL MEDICINE | Facility: CLINIC | Age: 58
End: 2023-03-13
Payer: MEDICARE

## 2023-03-13 VITALS
DIASTOLIC BLOOD PRESSURE: 88 MMHG | OXYGEN SATURATION: 96 % | HEART RATE: 63 BPM | BODY MASS INDEX: 40.48 KG/M2 | WEIGHT: 242.94 LBS | SYSTOLIC BLOOD PRESSURE: 144 MMHG | HEIGHT: 65 IN

## 2023-03-13 DIAGNOSIS — R73.9 HYPERGLYCEMIA: ICD-10-CM

## 2023-03-13 DIAGNOSIS — M46.1 SACROILIITIS: ICD-10-CM

## 2023-03-13 DIAGNOSIS — E78.5 HYPERLIPIDEMIA, UNSPECIFIED HYPERLIPIDEMIA TYPE: ICD-10-CM

## 2023-03-13 DIAGNOSIS — M10.9 GOUT, UNSPECIFIED CAUSE, UNSPECIFIED CHRONICITY, UNSPECIFIED SITE: ICD-10-CM

## 2023-03-13 DIAGNOSIS — K21.9 GASTROESOPHAGEAL REFLUX DISEASE WITHOUT ESOPHAGITIS: ICD-10-CM

## 2023-03-13 DIAGNOSIS — N39.46 MIXED INCONTINENCE URGE AND STRESS (MALE)(FEMALE): ICD-10-CM

## 2023-03-13 DIAGNOSIS — E55.9 MILD VITAMIN D DEFICIENCY: ICD-10-CM

## 2023-03-13 DIAGNOSIS — E53.1 PYRIDOXINE DEFICIENCY: ICD-10-CM

## 2023-03-13 DIAGNOSIS — E53.8 B12 DEFICIENCY: ICD-10-CM

## 2023-03-13 DIAGNOSIS — R94.6 ABNORMAL RESULTS OF THYROID FUNCTION STUDIES: ICD-10-CM

## 2023-03-13 DIAGNOSIS — E66.01 MORBID OBESITY: ICD-10-CM

## 2023-03-13 DIAGNOSIS — Z90.3 H/O GASTRIC SLEEVE: ICD-10-CM

## 2023-03-13 DIAGNOSIS — I70.0 AORTIC ATHEROSCLEROSIS: Primary | ICD-10-CM

## 2023-03-13 DIAGNOSIS — F33.41 RECURRENT MAJOR DEPRESSIVE DISORDER, IN PARTIAL REMISSION: ICD-10-CM

## 2023-03-13 LAB
25(OH)D3+25(OH)D2 SERPL-MCNC: 47 NG/ML (ref 30–96)
ALBUMIN SERPL BCP-MCNC: 4 G/DL (ref 3.5–5.2)
ALP SERPL-CCNC: 60 U/L (ref 55–135)
ALT SERPL W/O P-5'-P-CCNC: 23 U/L (ref 10–44)
ANION GAP SERPL CALC-SCNC: 11 MMOL/L (ref 8–16)
AST SERPL-CCNC: 22 U/L (ref 10–40)
BASOPHILS # BLD AUTO: 0.03 K/UL (ref 0–0.2)
BASOPHILS NFR BLD: 0.7 % (ref 0–1.9)
BILIRUB SERPL-MCNC: 0.9 MG/DL (ref 0.1–1)
BUN SERPL-MCNC: 11 MG/DL (ref 6–20)
CALCIUM SERPL-MCNC: 9.5 MG/DL (ref 8.7–10.5)
CHLORIDE SERPL-SCNC: 104 MMOL/L (ref 95–110)
CHOLEST SERPL-MCNC: 153 MG/DL (ref 120–199)
CHOLEST/HDLC SERPL: 2.3 {RATIO} (ref 2–5)
CO2 SERPL-SCNC: 27 MMOL/L (ref 23–29)
CREAT SERPL-MCNC: 0.7 MG/DL (ref 0.5–1.4)
DIFFERENTIAL METHOD: ABNORMAL
EOSINOPHIL # BLD AUTO: 0.1 K/UL (ref 0–0.5)
EOSINOPHIL NFR BLD: 2.6 % (ref 0–8)
ERYTHROCYTE [DISTWIDTH] IN BLOOD BY AUTOMATED COUNT: 14.1 % (ref 11.5–14.5)
EST. GFR  (NO RACE VARIABLE): >60 ML/MIN/1.73 M^2
ESTIMATED AVG GLUCOSE: 134 MG/DL (ref 68–131)
GLUCOSE SERPL-MCNC: 97 MG/DL (ref 70–110)
HBA1C MFR BLD: 6.3 % (ref 4–5.6)
HCT VFR BLD AUTO: 41.5 % (ref 37–48.5)
HDLC SERPL-MCNC: 68 MG/DL (ref 40–75)
HDLC SERPL: 44.4 % (ref 20–50)
HGB BLD-MCNC: 13.2 G/DL (ref 12–16)
IMM GRANULOCYTES # BLD AUTO: 0.01 K/UL (ref 0–0.04)
IMM GRANULOCYTES NFR BLD AUTO: 0.2 % (ref 0–0.5)
LDLC SERPL CALC-MCNC: 76.2 MG/DL (ref 63–159)
LYMPHOCYTES # BLD AUTO: 1.4 K/UL (ref 1–4.8)
LYMPHOCYTES NFR BLD: 32.5 % (ref 18–48)
MCH RBC QN AUTO: 30.9 PG (ref 27–31)
MCHC RBC AUTO-ENTMCNC: 31.8 G/DL (ref 32–36)
MCV RBC AUTO: 97 FL (ref 82–98)
MONOCYTES # BLD AUTO: 0.4 K/UL (ref 0.3–1)
MONOCYTES NFR BLD: 9.4 % (ref 4–15)
NEUTROPHILS # BLD AUTO: 2.3 K/UL (ref 1.8–7.7)
NEUTROPHILS NFR BLD: 54.6 % (ref 38–73)
NONHDLC SERPL-MCNC: 85 MG/DL
NRBC BLD-RTO: 0 /100 WBC
PLATELET # BLD AUTO: 234 K/UL (ref 150–450)
PMV BLD AUTO: 11.8 FL (ref 9.2–12.9)
POTASSIUM SERPL-SCNC: 3.9 MMOL/L (ref 3.5–5.1)
PROT SERPL-MCNC: 7.9 G/DL (ref 6–8.4)
RBC # BLD AUTO: 4.27 M/UL (ref 4–5.4)
SODIUM SERPL-SCNC: 142 MMOL/L (ref 136–145)
TRIGL SERPL-MCNC: 44 MG/DL (ref 30–150)
TSH SERPL DL<=0.005 MIU/L-ACNC: 1.41 UIU/ML (ref 0.4–4)
URATE SERPL-MCNC: 2.8 MG/DL (ref 2.4–5.7)
VIT B12 SERPL-MCNC: >2000 PG/ML (ref 210–950)
WBC # BLD AUTO: 4.25 K/UL (ref 3.9–12.7)

## 2023-03-13 PROCEDURE — 99214 PR OFFICE/OUTPT VISIT, EST, LEVL IV, 30-39 MIN: ICD-10-PCS | Mod: S$PBB,,, | Performed by: INTERNAL MEDICINE

## 2023-03-13 PROCEDURE — 84550 ASSAY OF BLOOD/URIC ACID: CPT | Performed by: INTERNAL MEDICINE

## 2023-03-13 PROCEDURE — 83036 HEMOGLOBIN GLYCOSYLATED A1C: CPT | Performed by: INTERNAL MEDICINE

## 2023-03-13 PROCEDURE — 84425 ASSAY OF VITAMIN B-1: CPT | Performed by: INTERNAL MEDICINE

## 2023-03-13 PROCEDURE — 80053 COMPREHEN METABOLIC PANEL: CPT | Performed by: INTERNAL MEDICINE

## 2023-03-13 PROCEDURE — 99999 PR PBB SHADOW E&M-EST. PATIENT-LVL IV: CPT | Mod: PBBFAC,,, | Performed by: INTERNAL MEDICINE

## 2023-03-13 PROCEDURE — 99999 PR PBB SHADOW E&M-EST. PATIENT-LVL IV: ICD-10-PCS | Mod: PBBFAC,,, | Performed by: INTERNAL MEDICINE

## 2023-03-13 PROCEDURE — 99214 OFFICE O/P EST MOD 30 MIN: CPT | Mod: S$PBB,,, | Performed by: INTERNAL MEDICINE

## 2023-03-13 PROCEDURE — 85025 COMPLETE CBC W/AUTO DIFF WBC: CPT | Performed by: INTERNAL MEDICINE

## 2023-03-13 PROCEDURE — 82607 VITAMIN B-12: CPT | Performed by: INTERNAL MEDICINE

## 2023-03-13 PROCEDURE — 80061 LIPID PANEL: CPT | Performed by: INTERNAL MEDICINE

## 2023-03-13 PROCEDURE — 84443 ASSAY THYROID STIM HORMONE: CPT | Performed by: INTERNAL MEDICINE

## 2023-03-13 PROCEDURE — 82306 VITAMIN D 25 HYDROXY: CPT | Performed by: INTERNAL MEDICINE

## 2023-03-13 PROCEDURE — 99214 OFFICE O/P EST MOD 30 MIN: CPT | Mod: PBBFAC | Performed by: INTERNAL MEDICINE

## 2023-03-13 PROCEDURE — 84207 ASSAY OF VITAMIN B-6: CPT | Performed by: INTERNAL MEDICINE

## 2023-03-13 PROCEDURE — 83921 ORGANIC ACID SINGLE QUANT: CPT | Performed by: INTERNAL MEDICINE

## 2023-03-13 PROCEDURE — 36415 COLL VENOUS BLD VENIPUNCTURE: CPT | Performed by: INTERNAL MEDICINE

## 2023-03-13 RX ORDER — OXYBUTYNIN CHLORIDE 5 MG/1
5 TABLET, EXTENDED RELEASE ORAL DAILY
Qty: 90 TABLET | Refills: 3 | Status: SHIPPED | OUTPATIENT
Start: 2023-03-13 | End: 2023-04-10

## 2023-03-13 RX ORDER — FLUOXETINE 20 MG/5ML
20 SOLUTION ORAL DAILY
Qty: 450 ML | Refills: 3 | Status: SHIPPED | OUTPATIENT
Start: 2023-03-13 | End: 2023-04-10

## 2023-03-13 RX ORDER — ATORVASTATIN CALCIUM 20 MG/1
20 TABLET, FILM COATED ORAL DAILY
Qty: 90 TABLET | Refills: 3 | Status: SHIPPED | OUTPATIENT
Start: 2023-03-13 | End: 2023-08-10

## 2023-03-13 RX ORDER — OXYCODONE AND ACETAMINOPHEN 10; 325 MG/1; MG/1
1 TABLET ORAL EVERY 8 HOURS PRN
Qty: 90 TABLET | Refills: 0 | Status: SHIPPED | OUTPATIENT
Start: 2023-03-13 | End: 2023-04-10 | Stop reason: SDUPTHER

## 2023-03-13 RX ORDER — COLCHICINE 0.6 MG/1
0.6 CAPSULE ORAL DAILY PRN
Qty: 90 CAPSULE | Refills: 3 | Status: SHIPPED | OUTPATIENT
Start: 2023-03-13 | End: 2023-09-13

## 2023-03-13 RX ORDER — OMEPRAZOLE 20 MG/1
20 CAPSULE, DELAYED RELEASE ORAL EVERY MORNING
Qty: 90 CAPSULE | Refills: 3 | Status: SHIPPED | OUTPATIENT
Start: 2023-03-13 | End: 2023-08-10

## 2023-03-13 NOTE — PROGRESS NOTES
Subjective:       Patient ID: Alivia Thomas is a 57 y.o. female.    Chief Complaint: Annual Exam    HPIPt is doing well - in pain today has not had any meds.  No CP or SOB.  Review of Systems   Constitutional:  Negative for activity change and unexpected weight change.   HENT:  Negative for hearing loss, rhinorrhea and trouble swallowing.    Eyes:  Negative for discharge and visual disturbance.   Respiratory:  Negative for chest tightness, shortness of breath (PND or orthopnea) and wheezing.    Cardiovascular:  Negative for chest pain and palpitations.   Gastrointestinal:  Negative for abdominal pain, blood in stool, constipation, diarrhea, nausea and vomiting.   Endocrine: Negative for polydipsia and polyuria.   Genitourinary:  Negative for difficulty urinating, dysuria, hematuria and menstrual problem.   Musculoskeletal:  Positive for arthralgias and joint swelling. Negative for neck pain.   Neurological:  Negative for seizures, syncope, weakness and headaches.   Psychiatric/Behavioral:  Negative for confusion and dysphoric mood.      Objective:      Physical Exam  Constitutional:       General: She is not in acute distress.     Appearance: She is well-developed.   HENT:      Head: Normocephalic.   Eyes:      Pupils: Pupils are equal, round, and reactive to light.   Neck:      Thyroid: No thyromegaly.      Vascular: No JVD.   Cardiovascular:      Rate and Rhythm: Normal rate and regular rhythm.      Heart sounds: Normal heart sounds. No murmur heard.    No friction rub. No gallop.   Pulmonary:      Effort: Pulmonary effort is normal.      Breath sounds: Normal breath sounds. No wheezing or rales.   Abdominal:      General: Bowel sounds are normal. There is no distension.      Palpations: Abdomen is soft. There is no mass.      Tenderness: There is no abdominal tenderness. There is no guarding or rebound.   Musculoskeletal:      Cervical back: Neck supple.   Lymphadenopathy:      Cervical: No cervical  adenopathy.   Skin:     General: Skin is warm and dry.   Neurological:      Mental Status: She is alert and oriented to person, place, and time.      Deep Tendon Reflexes: Reflexes are normal and symmetric.   Psychiatric:         Behavior: Behavior normal.         Thought Content: Thought content normal.         Judgment: Judgment normal.       Assessment:       1. Aortic atherosclerosis    2. Gastroesophageal reflux disease without esophagitis    3. Gout, unspecified cause, unspecified chronicity, unspecified site    4. Mixed incontinence urge and stress (male)(female)    5. Morbid obesity    6. Sacroiliitis    7. Recurrent major depressive disorder, in partial remission    8. Hyperlipidemia, unspecified hyperlipidemia type    9. Hyperglycemia    10. Mild vitamin D deficiency    11. B12 deficiency    12. H/O gastric sleeve    13. Pyridoxine deficiency    14. Abnormal results of thyroid function studies          Plan:   Aortic atherosclerosis  Stable on statin  Gastroesophageal reflux disease without esophagitis  -     omeprazole (PRILOSEC) 20 MG capsule; Take 1 capsule (20 mg total) by mouth every morning.  Dispense: 90 capsule; Refill: 3    Gout, unspecified cause, unspecified chronicity, unspecified site  -     colchicine (MITIGARE) 0.6 mg Cap; Take 1 capsule (0.6 mg total) by mouth daily as needed (flare up).  Dispense: 90 capsule; Refill: 3  -     Uric Acid; Future; Expected date: 03/13/2023    Mixed incontinence urge and stress (male)(female)  -     oxybutynin (DITROPAN-XL) 5 MG TR24; Take 1 tablet (5 mg total) by mouth once daily.  Dispense: 90 tablet; Refill: 3    Morbid obesity  S/p gastric sleeve  Sacroiliitis  Per pain management  Recurrent major depressive disorder, in partial remission  Stable on fluoxetine  Hyperlipidemia, unspecified hyperlipidemia type  -     Lipid Panel; Future; Expected date: 03/13/2023    Hyperglycemia  -     CBC Auto Differential; Future; Expected date: 03/13/2023  -      Comprehensive Metabolic Panel; Future; Expected date: 03/13/2023  -     TSH; Future; Expected date: 03/13/2023  -     Hemoglobin A1C; Future; Expected date: 03/13/2023  -     Urinalysis; Future; Expected date: 03/13/2023  -     Microalbumin/Creatinine Ratio, Urine; Future; Expected date: 03/13/2023    Mild vitamin D deficiency  -     Vitamin D; Future; Expected date: 03/13/2023    B12 deficiency  -     Vitamin B12; Future; Expected date: 04/13/2023  -     Methylmalonic Acid, Serum; Future; Expected date: 03/13/2023    H/O gastric sleeve  -     VITAMIN B1; Future; Expected date: 03/13/2023  -     VITAMIN B6; Future; Expected date: 03/13/2023    Pyridoxine deficiency  -     VITAMIN B6; Future; Expected date: 03/13/2023    Abnormal results of thyroid function studies  -     TSH; Future; Expected date: 03/13/2023    Other orders  -     atorvastatin (LIPITOR) 20 MG tablet; Take 1 tablet (20 mg total) by mouth once daily.  Dispense: 90 tablet; Refill: 3  -     FLUoxetine (PROZAC) 20 mg/5 mL (4 mg/mL) solution; Take 5 mLs (20 mg total) by mouth once daily.  Dispense: 450 mL; Refill: 3    BP check in 2 weeks after her pain meds  Answers submitted by the patient for this visit:  Review of Systems Questionnaire (Submitted on 3/12/2023)  activity change: No  unexpected weight change: No  neck pain: No  hearing loss: No  rhinorrhea: No  trouble swallowing: No  eye discharge: No  visual disturbance: No  chest tightness: No  wheezing: No  chest pain: No  palpitations: No  blood in stool: No  constipation: No  vomiting: No  diarrhea: No  polydipsia: No  polyuria: No  difficulty urinating: No  hematuria: No  menstrual problem: No  dysuria: No  joint swelling: Yes  arthralgias: Yes  headaches: No  weakness: No  confusion: No  dysphoric mood: No

## 2023-03-14 ENCOUNTER — PATIENT MESSAGE (OUTPATIENT)
Dept: BARIATRICS | Facility: CLINIC | Age: 58
End: 2023-03-14
Payer: MEDICARE

## 2023-03-14 ENCOUNTER — OFFICE VISIT (OUTPATIENT)
Dept: OPTOMETRY | Facility: CLINIC | Age: 58
End: 2023-03-14
Payer: MEDICARE

## 2023-03-14 DIAGNOSIS — H25.13 NUCLEAR SCLEROSIS, BILATERAL: ICD-10-CM

## 2023-03-14 DIAGNOSIS — E11.9 TYPE 2 DIABETES MELLITUS WITHOUT RETINOPATHY: Primary | ICD-10-CM

## 2023-03-14 PROCEDURE — 92014 PR EYE EXAM, EST PATIENT,COMPREHESV: ICD-10-PCS | Mod: S$PBB,,, | Performed by: OPTOMETRIST

## 2023-03-14 PROCEDURE — 99999 PR PBB SHADOW E&M-EST. PATIENT-LVL III: ICD-10-PCS | Mod: PBBFAC,,, | Performed by: OPTOMETRIST

## 2023-03-14 PROCEDURE — 99213 OFFICE O/P EST LOW 20 MIN: CPT | Mod: PBBFAC,PO | Performed by: OPTOMETRIST

## 2023-03-14 PROCEDURE — 92014 COMPRE OPH EXAM EST PT 1/>: CPT | Mod: S$PBB,,, | Performed by: OPTOMETRIST

## 2023-03-14 PROCEDURE — 99999 PR PBB SHADOW E&M-EST. PATIENT-LVL III: CPT | Mod: PBBFAC,,, | Performed by: OPTOMETRIST

## 2023-03-14 NOTE — PROGRESS NOTES
HPI     Blurred Vision     Additional comments: Blur ou at dist, x mos, no assoc pain or red, no   relief over time, constant           Last edited by Justin Dodson, OD on 3/14/2023  3:20 PM.            Assessment /Plan     For exam results, see Encounter Report.    Type 2 diabetes mellitus without retinopathy - Both Eyes    Nuclear sclerosis, bilateral      No diabetic retinopathy, no csme. Return in 1 year for dilated eye exam.   2. Refer to Dr. Block for cataract evaluation and possible removal.

## 2023-03-17 LAB
METHYLMALONATE SERPL-SCNC: 0.17 UMOL/L
PYRIDOXAL SERPL-MCNC: 22 UG/L (ref 5–50)
VIT B1 BLD-MCNC: 87 UG/L (ref 38–122)

## 2023-03-20 ENCOUNTER — OFFICE VISIT (OUTPATIENT)
Dept: PODIATRY | Facility: CLINIC | Age: 58
End: 2023-03-20
Payer: MEDICARE

## 2023-03-20 VITALS
HEIGHT: 65 IN | WEIGHT: 242 LBS | HEART RATE: 84 BPM | BODY MASS INDEX: 40.32 KG/M2 | DIASTOLIC BLOOD PRESSURE: 70 MMHG | SYSTOLIC BLOOD PRESSURE: 129 MMHG

## 2023-03-20 DIAGNOSIS — L85.3 DRY SKIN: ICD-10-CM

## 2023-03-20 DIAGNOSIS — E11.49 TYPE II DIABETES MELLITUS WITH NEUROLOGICAL MANIFESTATIONS: Primary | ICD-10-CM

## 2023-03-20 DIAGNOSIS — B35.1 DERMATOPHYTOSIS OF NAIL: ICD-10-CM

## 2023-03-20 DIAGNOSIS — L84 CORNS AND CALLUS: ICD-10-CM

## 2023-03-20 PROCEDURE — 11721 DEBRIDE NAIL 6 OR MORE: CPT | Mod: Q9,PBBFAC,PN | Performed by: PODIATRIST

## 2023-03-20 PROCEDURE — 11056 PARNG/CUTG B9 HYPRKR LES 2-4: CPT | Mod: Q9,S$PBB,, | Performed by: PODIATRIST

## 2023-03-20 PROCEDURE — 99999 PR PBB SHADOW E&M-EST. PATIENT-LVL IV: CPT | Mod: PBBFAC,,, | Performed by: PODIATRIST

## 2023-03-20 PROCEDURE — 99499 NO LOS: ICD-10-PCS | Mod: S$PBB,,, | Performed by: PODIATRIST

## 2023-03-20 PROCEDURE — 11056 ROUTINE FOOT CARE: ICD-10-PCS | Mod: Q9,S$PBB,, | Performed by: PODIATRIST

## 2023-03-20 PROCEDURE — 99214 OFFICE O/P EST MOD 30 MIN: CPT | Mod: PBBFAC,PN | Performed by: PODIATRIST

## 2023-03-20 PROCEDURE — 99499 UNLISTED E&M SERVICE: CPT | Mod: S$PBB,,, | Performed by: PODIATRIST

## 2023-03-20 PROCEDURE — 11721 ROUTINE FOOT CARE: ICD-10-PCS | Mod: Q9,59,S$PBB, | Performed by: PODIATRIST

## 2023-03-20 PROCEDURE — 99999 PR PBB SHADOW E&M-EST. PATIENT-LVL IV: ICD-10-PCS | Mod: PBBFAC,,, | Performed by: PODIATRIST

## 2023-03-20 RX ORDER — UREA 40 %
CREAM (GRAM) TOPICAL DAILY
Qty: 120 G | Refills: 5 | Status: SHIPPED | OUTPATIENT
Start: 2023-03-20

## 2023-03-20 NOTE — PROGRESS NOTES
Chief Complaint   Patient presents with    Diabetic Foot Exam     Foot Exam/PCP Clarisse Richardson MD  03/13/23           HPI:   The patient is a 57 y.o.  female  who presents for a diabetic foot exam/care   Patient reports + presence of abnormal sensation to the feet, just sometimes, mostly at night.   Patient has no history of wounds on the feet.   Hx of foot surgery: none.     Shoe gear: casual shoes  This patient has documented high risk feet requiring routine maintenance secondary to diabetes.            Primary care doctor is: Clarisse Richardson MD  Patient last saw primary care doctor on:  3/13/2023        Patient Active Problem List   Diagnosis    Morbid obesity    PADDY (obstructive sleep apnea)    Type 2 diabetes mellitus without complication, without long-term current use of insulin    Nuclear sclerosis - Both Eyes    Hyperlipemia    Proteinuria    Type 2 diabetes mellitus without retinopathy - Both Eyes    Bilateral knee pain    DJD (degenerative joint disease) of knee    Debility    Prosthetic joint implant failure    Failed total knee arthroplasty    Right knee pain    Knee pain    History of total left knee replacement    Lumbago    CTS (carpal tunnel syndrome)    Right carpal tunnel syndrome    Chronic, continuous use of opioids    Mild intermittent asthma without complication    Left arm pain    Left shoulder pain    Allergic reaction to food    DDD (degenerative disc disease), cervical    Cervical radiculopathy    Cervical spondylosis    Cubital tunnel syndrome on right    Bilateral shoulder bursitis    Cervical stenosis of spinal canal    DDD (degenerative disc disease), thoracic    Thoracic spondylosis    Spondylolisthesis of lumbar region    Lumbar spondylosis    Spondylolisthesis of cervical region    Cervical spine instability    Myeloradiculopathy    Neural foraminal stenosis of cervical spine    DDD (degenerative disc disease), lumbar    Idiopathic  scoliosis of thoracolumbar spine    Bilateral shoulder region arthritis    Impingement syndrome of right shoulder    Trochanteric bursitis of both hips    Chronic pain    Depression    Recurrent major depressive disorder, in partial remission    GERD (gastroesophageal reflux disease)    Trigger finger    Dyspnea    BMI 45.0-49.9, adult    Sacroiliac joint pain    Spondylosis of lumbosacral region without myelopathy or radiculopathy    Subacromial bursitis of left shoulder joint    Sacroiliitis    Snoring    BMI 39.0-39.9,adult    S/P panniculectomy    Gout    History of sleeve gastrectomy    History of total right knee replacement    Noncompliance with CPAP treatment    Osteoarthritis of lumbar spine    Aortic atherosclerosis           Current Outpatient Medications on File Prior to Visit   Medication Sig Dispense Refill    albuterol (VENTOLIN HFA) 90 mcg/actuation inhaler INHALE TWO PUFFS INTO LUNGS EVERY 4 TO 6 HOURS AS NEEDED FOR SHORTNESS OF BREATH AND FOR WHEEZING 18 g 1    allopurinoL (ZYLOPRIM) 300 MG tablet Take 1 tablet (300 mg total) by mouth 2 (two) times daily. 180 tablet 3    atorvastatin (LIPITOR) 20 MG tablet Take 1 tablet (20 mg total) by mouth once daily. 90 tablet 3    b complex vitamins tablet Take 1 tablet by mouth every other day.       calcium citrate/vitamin D2 (ISIAH-CITRATE ORAL) Take 500 mg by mouth 2 (two) times daily.      colchicine (MITIGARE) 0.6 mg Cap Take 1 capsule (0.6 mg total) by mouth daily as needed (flare up). 90 capsule 3    cyanocobalamin (VITAMIN B-12) 1000 MCG tablet Take 100 mcg by mouth every morning.      diclofenac sodium (VOLTAREN) 1 % Gel Apply 2 g topically 4 (four) times daily as needed. To painful area on the feet. 500 g 5    FLUoxetine (PROZAC) 20 mg/5 mL (4 mg/mL) solution Take 5 mLs (20 mg total) by mouth once daily. 450 mL 3    fluticasone propionate (FLONASE) 50 mcg/actuation nasal spray 1 spray (50 mcg total) by Each Nostril  route 2 (two) times daily as needed for Rhinitis. 15 g 6    indomethacin (INDOCIN) 50 MG capsule TAKE 1 CAPSULE BY MOUTH 2 TIMES DAILY WITH MEALS 30 capsule 2    LIDOcaine (XYLOCAINE) 5 % Oint ointment Apply topically as needed. 35.44 g 6    MULTIVIT-IRON-MIN-FOLIC ACID 3,500-18-0.4 UNIT-MG-MG ORAL CHEW Take 1 tablet by mouth 2 (two) times daily.       nystatin (MYCOSTATIN) powder Apply topically 2 (two) times daily. 60 g 3    omeprazole (PRILOSEC) 20 MG capsule Take 1 capsule (20 mg total) by mouth every morning. 90 capsule 3    oxybutynin (DITROPAN-XL) 5 MG TR24 Take 1 tablet (5 mg total) by mouth once daily. 90 tablet 3    oxyCODONE-acetaminophen (PERCOCET)  mg per tablet Take 1 tablet by mouth every 8 (eight) hours as needed for Pain. 90 tablet 0    vitamin D 185 MG Tab Take 5,000 mg by mouth every morning.        Current Facility-Administered Medications on File Prior to Visit   Medication Dose Route Frequency Provider Last Rate Last Admin    0.9%  NaCl infusion   Intravenous Continuous Lina aWgner MD 25 mL/hr at 12/15/20 1506 25 mL/hr at 12/15/20 1506       Review of patient's allergies indicates:  No Known Allergies                  ROS:  General ROS: negative  Respiratory ROS: no cough, shortness of breath, or wheezing  Cardiovascular ROS: no chest pain or dyspnea on exertion  Musculoskeletal ROS: negative  Neurological ROS:   negative for - gait disturbance, + numbness/tingling, seizures or tremors  Dermatological ROS: + nail changes, dry skin          LAST HbA1c:   Hemoglobin A1C   Date Value Ref Range Status   03/13/2023 6.3 (H) 4.0 - 5.6 % Final     Comment:     ADA Screening Guidelines:  5.7-6.4%  Consistent with prediabetes  >or=6.5%  Consistent with diabetes    High levels of fetal hemoglobin interfere with the HbA1C  assay. Heterozygous hemoglobin variants (HbS, HgC, etc)do  not significantly interfere with this assay.   However, presence of multiple variants may affect  "accuracy.     05/09/2022 5.9 (H) 4.0 - 5.6 % Final     Comment:     ADA Screening Guidelines:  5.7-6.4%  Consistent with prediabetes  >or=6.5%  Consistent with diabetes    High levels of fetal hemoglobin interfere with the HbA1C  assay. Heterozygous hemoglobin variants (HbS, HgC, etc)do  not significantly interfere with this assay.   However, presence of multiple variants may affect accuracy.     11/08/2021 6.3 (H) 4.0 - 5.6 % Final     Comment:     ADA Screening Guidelines:  5.7-6.4%  Consistent with prediabetes  >or=6.5%  Consistent with diabetes    High levels of fetal hemoglobin interfere with the HbA1C  assay. Heterozygous hemoglobin variants (HbS, HgC, etc)do  not significantly interfere with this assay.   However, presence of multiple variants may affect accuracy.           EXAM:   Vitals:    03/20/23 1431   BP: 129/70   Pulse: 84   Weight: 109.8 kg (242 lb)   Height: 5' 5" (1.651 m)       General: Pt. is well-developed, well-nourished, appears stated age, in no acute distress, alert and oriented x 3.      Vascular: Dorsalis pedis and posterior tibial pulses are 2/4 bilaterally. 3 secs capillary refill time and toes are warm to touch.    There is reduced hair growth on the feet.    There is 1+ edema.  no varicosities.      Neurological:  Light touch, proprioception, and sharp/dull sensation are all intact bilaterally. Protective threshold with the [place] monofilament is diminished bilaterally.   +paresthesias      Dermatological:  The skin is thin    The toenails  1-5 L and 1-5 R  are greenish- yellow, long by 5-6mm and thickened by 2-3mm with subungual debris  .   There are no open wounds. There is no ecchymoses.  no erythema noted.   Skin diffusely dry on the soles     Interdigital spaces are intact, no fissures    Skin atrophic, thin, dry and mildly hyperpigmented.       Musculoskeletal:    Muscle strength is 5/5 in all groups bilaterally.    Metatarsophalangeal, subtalar, and ankle range of " motion are intact without crepitus.     Ankle equinus bilateral       Assessment / Plan:      ICD-10-CM ICD-9-CM    1. Type II diabetes mellitus with neurological manifestations  E11.49 250.60       2. Corns and callus  L84 700       3. Dermatophytosis of nail  B35.1 110.1       4. Dry skin  L85.3 701.1 urea (CARMOL) 40 % Crea          I counseled the patient on the patient's conditions, their implications and medical management.  Shoe inspection.     Continue good nutrition and blood sugar control to help prevent podiatric complications of diabetes.   Maintain proper foot hygiene.   Continue wearing proper shoe gear, daily foot inspections, never walking without protective shoe gear, never putting sharp instruments to feet.  Shoe and activity modification as needed for relief.         Routine Foot Care    Date/Time: 3/20/2023 2:30 PM  Performed by: Stacey Rowland DPM  Authorized by: Stacey Rowland DPM     Consent Done?:  Yes (Verbal)  Hyperkeratotic Skin Lesions?: Yes    Number of trimmed lesions:  2  Location(s):  Left Plantar and Right Plantar    Nail Care Type:  Debride  Location(s): All  (Left 1st Toe, Left 3rd Toe, Left 2nd Toe, Left 4th Toe, Left 5th Toe, Right 1st Toe, Right 2nd Toe, Right 3rd Toe, Right 4th Toe and Right 5th Toe)  Patient tolerance:  Patient tolerated the procedure well with no immediate complications     With patient's permission, the toenails mentioned above were reduced and debrided using a nail nipper, removing offending nail and debris.   Utilizing a #15 scalpel, I trimmed the corns and calluses at the above mentioned location.      The patient will continue to monitor the areas daily, inspect the feet, wear protective shoe gear when ambulatory, and moisturizer to maintain skin integrity.

## 2023-03-20 NOTE — PATIENT INSTRUCTIONS
How to Check Your Feet    Below are tips to help you look for foot problems. Try to check your feet at the same time each day, such as when you get out of bed in the morning.    Check the top of each foot. The tops of toes, back of the heel, and outer edge of the foot can get a lot of rubbing from poor-fitting shoes.    Check the bottom of each foot. Daily wear and tear often leads to problems at pressure spots.    Check the toes and nails. Fungal infections often occur between toes. Toenail problems can also be a sign of fungal infections or lead to breaks in the skin.    Check your shoes, too. Loose objects inside a shoe can injure the foot. Use your hand to feel inside your shoes for things like alie, loose stitching, or rough areas that could irritate your skin.        Diabetic Foot Care    Diabetes can lead to a number of different foot complications. Fortunately, most of these complications can be prevented with a little extra foot care. If diabetes is not well controlled, the high blood sugar can cause damage to blood vessels and result in poor circulation to the foot. When the skin does not get enough blood flow, it becomes prone to pressure sores and ulcers, which heal slowly.  High blood sugar can also damage nerves, interfering with the ability to feel pain and pressure. When you cant feel your foot normally, it is easy to injure your skin, bones and joints without knowing it. For these reasons diabetes increases the risk of fungal infections, bunions and ulcers. Deep ulcers can lead to bone infection. Gangrene is the most serious foot complication of diabetes. It usually occurs on the tips of the toes as blacked areas of skin. The black area is dead tissue. In severe cases, gangrene spreads to involve the entire toe, other toes and the entire foot. Foot or toe amputation may be required. Good foot care and blood sugar control can prevent this.    Home Care  Wear comfortable, proper fitting  shoes.  Wash your feet daily with warm water and mild soap.  After drying, apply a moisturizing cream or lotion.  Check your feet daily for skin breaks, blisters, swelling, or redness. Look between your toes also.  Wear cotton socks and change them every day.  Trim toe nails carefully and do not cut your cuticles.  Strive to keep your blood sugar under control with a combination of medicines, diet and activity.  If you smoke and have diabetes, it is very important that you stop. Smoking reduces blood flow to your foot.  Avoid activities that increase your risk of foot injury:  Do not walk barefoot.  Do not use heating pads or hot water bottles on your feet.  Do not put your foot in a hot tub without first checking the temperature with your hand.  10) Schedule yearly foot exams.    Follow Up  with your doctor or as advised by our staff. Report any cut, puncture, scrape, other injury, blister, ingrown toenail or ulcer on your foot.    Get Prompt Medical Attention  if any of the following occur:  -- Open ulcer with pus draining from the wound  -- Increasing foot or leg pain  -- New areas of redness or swelling or tender areas of the foot    © 0658-5412 [employer]. [address] [place] [address], [place] [address]. All rights reserved. This information is not intended as a substitute for professional medical care. Always follow your healthcare professional's instructions.

## 2023-03-27 ENCOUNTER — OFFICE VISIT (OUTPATIENT)
Dept: BARIATRICS | Facility: CLINIC | Age: 58
End: 2023-03-27
Payer: MEDICARE

## 2023-03-27 VITALS — SYSTOLIC BLOOD PRESSURE: 138 MMHG | DIASTOLIC BLOOD PRESSURE: 76 MMHG | BODY MASS INDEX: 39.77 KG/M2 | WEIGHT: 239 LBS

## 2023-03-27 DIAGNOSIS — Z90.3 S/P GASTRIC SLEEVE PROCEDURE: Primary | ICD-10-CM

## 2023-03-27 PROCEDURE — 99999 PR PBB SHADOW E&M-EST. PATIENT-LVL IV: ICD-10-PCS | Mod: PBBFAC,,, | Performed by: NURSE PRACTITIONER

## 2023-03-27 PROCEDURE — 99214 OFFICE O/P EST MOD 30 MIN: CPT | Mod: PBBFAC | Performed by: NURSE PRACTITIONER

## 2023-03-27 PROCEDURE — 99215 OFFICE O/P EST HI 40 MIN: CPT | Mod: S$PBB,ICN,, | Performed by: NURSE PRACTITIONER

## 2023-03-27 PROCEDURE — 99215 PR OFFICE/OUTPT VISIT, EST, LEVL V, 40-54 MIN: ICD-10-PCS | Mod: S$PBB,ICN,, | Performed by: NURSE PRACTITIONER

## 2023-03-27 PROCEDURE — 99999 PR PBB SHADOW E&M-EST. PATIENT-LVL IV: CPT | Mod: PBBFAC,,, | Performed by: NURSE PRACTITIONER

## 2023-03-27 NOTE — PROGRESS NOTES
BARIATRIC POST-OPERATIVE FOLLOW UP:    HPI:  57 y.o.-year-old female presents for 3 year post op.    Denies: nausea, vomiting, abdominal pain, changes in bowel movement pattern, fever, chills, dysphagia, chest pain, and shortness of breath.    Off all diabetic mediations     ROS:    Review of Systems   Constitutional:  Negative for activity change and fatigue.   Respiratory:  Negative for cough and shortness of breath.    Cardiovascular:  Negative for chest pain, palpitations and leg swelling.   Gastrointestinal:  Negative for abdominal pain, nausea and vomiting.   Endocrine: Negative for polydipsia, polyphagia and polyuria.   Genitourinary:  Negative for dysuria.   Musculoskeletal:  Negative for gait problem.   Skin:  Negative for rash.   Allergic/Immunologic: Negative for immunocompromised state.   Neurological:  Negative for dizziness, syncope and weakness.   Hematological:  Does not bruise/bleed easily.   Psychiatric/Behavioral:  Negative for behavioral problems.      EXERCISE:  Walking more     VITAMINS:  Tolerating well     MEDICATIONS/ALLERGIES:  Have been reviewed.    DIET:  Bariatric Regular Diet   Protein limited to gout   Fluids 64 Ozs    PHYSICAL EXAM:  GENERAL: 57 y.o.-year-old female in NAD, A&O x3  VITAL SIGNS:  BP:    CHEST: Clear to auscultation, regular effort.  HEART: Regular rate and rhythm. No murmurs, clicks, or gallops.  ABDOMEN: Bowel sounds present in all four quadrants. Soft, non-tender, non-distended. Well-healing surgical scars are clean, dry, and intact without signs of infection or bleeding.  EXTREMITIES: No clubbing, cyanosis, or edema.    Physical Exam  Vitals and nursing note reviewed.   Constitutional:       Appearance: She is well-developed. She is morbidly obese.   HENT:      Head: Normocephalic.      Nose: Nose normal.      Mouth/Throat:      Mouth: Mucous membranes are moist.   Eyes:      Extraocular Movements: Extraocular movements intact.   Cardiovascular:      Rate and  Rhythm: Normal rate and regular rhythm.      Heart sounds: Normal heart sounds.   Pulmonary:      Effort: Pulmonary effort is normal.      Breath sounds: Normal breath sounds.   Abdominal:      General: Bowel sounds are normal.      Palpations: Abdomen is soft.   Musculoskeletal:         General: Normal range of motion.      Cervical back: Normal range of motion.   Skin:     General: Skin is warm and dry.      Capillary Refill: Capillary refill takes less than 2 seconds.   Neurological:      Mental Status: She is alert and oriented to person, place, and time.   Psychiatric:         Mood and Affect: Mood normal.       ASSESSMENT:  - Morbid obesity s/p sleeve gastrectomy on 8/2019.  - Co-morbidities: dyslipidemia, GERD, and obstructive sleep apnea  - Weight loss 107 lbs  52% EWL  - Exercise regimen  - Diet good    PLAN:  - Emphasized the importance of regular exercise and adherence to bariatric diet to achieve maximum weight loss.  - Encouraged patient to begin OR continue regular exercise.  - Follow-up with dietician to reinforce diet.  - Continue daily vitamins and medications.  - RTC in 12 months or sooner if needed.  - Call the office for any issues.  - Check by PCP and reviewed

## 2023-03-27 NOTE — PATIENT INSTRUCTIONS

## 2023-04-05 ENCOUNTER — PATIENT MESSAGE (OUTPATIENT)
Dept: BARIATRICS | Facility: CLINIC | Age: 58
End: 2023-04-05
Payer: MEDICARE

## 2023-04-06 ENCOUNTER — PATIENT MESSAGE (OUTPATIENT)
Dept: PAIN MEDICINE | Facility: CLINIC | Age: 58
End: 2023-04-06
Payer: MEDICARE

## 2023-04-10 ENCOUNTER — OFFICE VISIT (OUTPATIENT)
Dept: PAIN MEDICINE | Facility: CLINIC | Age: 58
End: 2023-04-10
Payer: MEDICARE

## 2023-04-10 DIAGNOSIS — M25.562 CHRONIC PAIN OF BOTH KNEES: ICD-10-CM

## 2023-04-10 DIAGNOSIS — M51.36 DDD (DEGENERATIVE DISC DISEASE), LUMBAR: ICD-10-CM

## 2023-04-10 DIAGNOSIS — G89.4 CHRONIC PAIN DISORDER: Primary | ICD-10-CM

## 2023-04-10 DIAGNOSIS — M47.817 SPONDYLOSIS OF LUMBOSACRAL REGION WITHOUT MYELOPATHY OR RADICULOPATHY: ICD-10-CM

## 2023-04-10 DIAGNOSIS — M54.9 INTRACTABLE BACK PAIN: ICD-10-CM

## 2023-04-10 DIAGNOSIS — M25.561 CHRONIC PAIN OF BOTH KNEES: ICD-10-CM

## 2023-04-10 DIAGNOSIS — G89.29 CHRONIC PAIN OF BOTH KNEES: ICD-10-CM

## 2023-04-10 DIAGNOSIS — Z96.653 STATUS POST TOTAL BILATERAL KNEE REPLACEMENT: ICD-10-CM

## 2023-04-10 DIAGNOSIS — G89.4 CHRONIC PAIN SYNDROME: ICD-10-CM

## 2023-04-10 DIAGNOSIS — M47.816 LUMBAR SPONDYLOSIS: ICD-10-CM

## 2023-04-10 PROCEDURE — 99213 PR OFFICE/OUTPT VISIT, EST, LEVL III, 20-29 MIN: ICD-10-PCS | Mod: 95,,, | Performed by: NURSE PRACTITIONER

## 2023-04-10 PROCEDURE — 99213 OFFICE O/P EST LOW 20 MIN: CPT | Mod: 95,,, | Performed by: NURSE PRACTITIONER

## 2023-04-10 RX ORDER — OXYCODONE AND ACETAMINOPHEN 10; 325 MG/1; MG/1
1 TABLET ORAL EVERY 8 HOURS PRN
Qty: 90 TABLET | Refills: 0 | Status: SHIPPED | OUTPATIENT
Start: 2023-04-12 | End: 2023-05-09 | Stop reason: SDUPTHER

## 2023-04-10 NOTE — PROGRESS NOTES
Chronic patient Established Note (Follow up visit)  Chronic Pain-Tele-Medicine-Established Note (Follow up visit)        The patient location is: Home  The chief complaint leading to consultation is: pain  Visit type: Virtual visit with synchronous audio and video  Total time spent with patient: 25 min  Each patient to whom he or she provides medical services by telemedicine is:  (1) informed of the relationship between the physician and patient and the respective role of any other health care provider with respect to management of the patient; and (2) notified that he or she may decline to receive medical services by telemedicine and may withdraw from such care at any time.      SUBJECTIVE:    Interval History 4/10/2023:  The patient returns to clinic today for follow up of low back and knee pain via virtual visit. She is s/p right L3,4,5 RFA on 3/6/2023. Her left side procedure was rescheduled due to illness. This is scheduled for May 1st. She reports some relief of her right sided low back pain. She continues to report left sided low back pain. She denies any radicular leg pain. She does endorse morning stiffness. She continues to perform a home exercise routine. She is taking Percocet as needed for severe pain. She denies any other health changes.     Interval History 1/30/2023:  The patient returns to clinic today for follow up of low back and knee pain. She is s/p right genicular RFA on 12/12/2022 and left genicular RFA on 1/9/2023. She reports 60% relief of her knee pain. She reports intermittent bilateral knee pain, this is tolerable at this time. She reports increased low back pain. Her pain is constant and aching in nature. Her leg pain is much improved. Her pain is worse with moving from sitting to standing. She does endorse morning stiffness. She continues to participate in physical therapy and a home exercise routine. She continues to take Percocet as needed with benefit and without adverse effects. She  denies any other health changes.     Interval History 11/22/2022:  The patient returns to clinic today for follow up of low back and knee pain. She continues to report low back pain that radiates into the posterior aspect of both legs to under her feet. Her pain is worse with moving from sitting to standing. She also endorses morning stiffness. She recently started physical therapy. She has completed one session. She continues to report bilateral knee pain. She endorses worsening pain with the cold weather. Her pain is worse with standing and walking. She continues to take Percocet as needed with benefit and without adverse effects. She denies any other health changes. Her pain today is 8/10.    Interval History 10/5/2022:  She returns for follow-up.  Her knees are doing well.  She has some discomfort but not enough to do procedures.  Her main complaint is lumbar spine pain for the past few weeks that is radiating to the dorsal aspect of both lower extremities.  She has no red flag signs/symptoms.  No new bowel or bladder symptomatology.  The pain radiates from the back down to the plantar aspect of both feet.  It is activity related.  Rest helps some but it does not resolve the pain.  She has not been in therapy for a while.  No recent imaging.  No recent weight changes.  Otherwise, no new symptomatology.  She goes regularly to her primary care physician for health maintenance.    Interval History 8/9/2022:  Alivia Thomas presents to the clinic for a follow-up appointment for low back and knee pain. She is s/p right genicular RFA on 5/9/2022 and left genicular RFA on 5/22/2022. She reports 60% relief of her knee pain. She also had panniculectomy on 6/14/2022. She is healing well. She continues to report intermittent low back pain, worse with prolonged activity. She denies any radicular leg pain. Her pain is worse prolonged standing and walking. She continues to perform a homoe exercise routine. She continues to  take Percocet as needed with benefit and without adverse effects. She denies any other health changes. Her pain today is 7/10.    Interval History 4/9/2022:  The patient returns to clinic today for follow-up bilateral L3, 4, 5 RFA last month.  She reports that she is feeling very well following the procedure and reports 60-70% pain relief.  Today, she reports that her bilateral knee pain has continued to worsen, and she would like to go ahead and repeat bilateral genicular RFA as soon as possible.  She denies any new numbness, tingling, weakness, saddle anesthesia or bowel/bladder incontinence.    Interval History 12/28/2021:  The patient returns to clinic today for follow up via virtual visit. She continues to report bilateral knee pain. This pain is worse with prolonged standing and walking. She continues to report low back and buttock pain. She denies any radicular leg pain. She continues to report a home exercise routine. She continues to report benefit with Percocet. She denies any adverse effects. She denies any other health changes.    Pain Disability Index Review:  Last 3 PDI Scores 11/22/2022 10/5/2022 4/19/2022   Pain Disability Index (PDI) 40 48 25       Pain Medications:  Percocet    Opioid Contract: yes     report:  Reviewed and consistent with medication use as prescribed.    Pain Procedures:   steroid inj and visco-supplementation (series of 3) in left knee- not helpful  3/31/14 Bilateral genicular nerve blocks  2/16/16 Bilateral L2,3,4,5 MBB  3/1/16 Bilateral L2,3,4,5 MBB   4/6/16 Left L2,3,4,5 RFA- significant relief  4/20/16 Right L2,3,4,5 RFA- significant relief  10/5/16 Left L2,3,4,5 cooled RFA  10/19/16 Right L2,3,4,5 cooled RFA  4/26/17 Left L2,3,4,5 cooled RFA  5/9/17 Right L2,3,4,5 cooled RFA  12/26/2017- Left L2,3,4,5 cooled RFA  1/9/2018- Right L2,3,4,5 cooled RFA  6/26/18 Left L2,3,4,5 cooled RFA- 60% relief  7/10/18 Right L2,3,4,5 cooled RFA- 60% relief  9/28/18 TPIs- significant  relief  12/27/18 Left L2,3,4,5 RFA- 70% relief  1/29/19 Right L2,3,4,5 RFA- 70% relief  6/18/19 Left L2,3,4,5 RFA- 70% relief  7/9/19 Right L2,3,4,5 RFA- 50% relief  12/19/19 Left L2,3,4,5 RFA- 80% relief  12/31/19 Right L2,3,4,5 RFA- 50% relief  3/2/2020- Right genicular nerve block- 70% relief for one month  3/12/20 Left subacromial bursa injection- 90% relief  5/18/2020- Left genicular nerve block- 70%  6/9/2020- Bilateral SI joint injections- 50% relief  10/13/2020- Right genicular RFA- 50% relief   11/3/2020- Left genicular RFA- 50% relief  12/15/2020- Left L2,3,4,5 RFA  12/29/2020- Right L2,3,4,5 RFA  4/26/2021- Left subacromial bursa'  5/11/2021- Bilateral SI joint injections   6/28/2021- Left genicular RFA  7/13/2021- Right genicular RFA  8/24/2021- Right L2,3,4,5 RFA  9/11/2021- Left L2,3,4,5 RFA  3/7/2022- Right L2,3,4,5 RFA  3/21/2022- Left L2,3,4,5 RFA  5/9/2022- Right genicular RFA  5/23/2022- Left genicular RFA  12/12/2022- Right genicular RFA  1/9/2023- Left genicular RFA  3/6/2023- Right L3,4,5 RFA    Physical Therapy/Home Exercise: yes    Imaging:   Xray Knee 3/6/2019:  FINDINGS:  Postop bilateral knee replacements.  The the the the irregularity about the left patella with soft tissue calcifications similar.  Small joint effusion.  Some irregularity of the right patella unchanged as well.     IMPRESSION:      Postop bilateral knee replacement with irregularity about the patella as above.    MRI Cervical Spine 4/24/2018:  FINDINGS:  There is reversal of the normal cervical lordosis which could be related to patient positioning versus muscle spasm.     Cervical vertebral body heights are well maintained without evidence of acute fracture or dislocation.  Marrow signal is unremarkable.     Spinal canal contents are unremarkable.  No abnormal masses or collections.     Individual levels as detailed below:     C2-3: No significant spinal canal stenosis or neuroforaminal narrowing.     C3-4: Facet  arthropathy.  No significant spinal canal stenosis or neuroforaminal narrowing.     C4-5: Facet arthropathy.  Small broad-based disc osteophyte complex.  Mild right neuroforaminal narrowing.  Mild spinal canal stenosis.     C5-6: Facet arthropathy.  Uncovertebral joint spurring.  Broad-based posterior disc osteophyte complex.  Mild right neuroforaminal narrowing.  Mild spinal canal stenosis.     C6-7: Facet arthropathy.  No significant spinal canal stenosis or neuroforaminal narrowing.     C7-T1: No significant spinal canal stenosis or neuroforaminal narrowing.     Paraspinal muscles are unremarkable.  Soft tissues are intact.     IMPRESSION:      Mild multilevel cervical spondylosis with mild spinal canal stenosis and mild right neuroforaminal narrowing at C4-5 and C5-6.    Xray Lumbar Spine 9/21/2015:  Results: AP, lateral neutral, lateral flexion , lateral extension, bilateral oblique and spot views.  The alignment of the lumbar spine demonstrates a mild levoscoliosis .  11-mm anterior listhesis of L4 relative to L5 with no translational abnormalities   seen on flexion and extension views.  The vertebral body heights  are well-maintained  , mild disk space narrowing L4-L5 and L5-S1.   Mild anterior and marginal osteophyte formation seen  throughout the lumbar spine  .  The oblique views demonstrate no   definite spondylolysis.  There is facet joint osseous hypertrophy noted at L3-L4 and L4-L5.  IMPRESSION:         The Significant spondylosis of the lumbar spine with grade 1 anterior listhesis L4 relative to L5.  Facet joint osseous hypertrophy L3-L4 and L4-5.    Allergies:   Review of patient's allergies indicates:   Allergen Reactions    Strawberry Anaphylaxis       Current Medications:   Current Outpatient Medications   Medication Sig Dispense Refill    albuterol (VENTOLIN HFA) 90 mcg/actuation inhaler INHALE TWO PUFFS INTO LUNGS EVERY 4 TO 6 HOURS AS NEEDED FOR SHORTNESS OF BREATH AND FOR WHEEZING 18 g 1     allopurinoL (ZYLOPRIM) 300 MG tablet Take 1 tablet (300 mg total) by mouth 2 (two) times daily. 180 tablet 3    atorvastatin (LIPITOR) 20 MG tablet Take 1 tablet (20 mg total) by mouth once daily. 90 tablet 3    b complex vitamins tablet Take 1 tablet by mouth every other day.       calcium citrate/vitamin D2 (ISIAH-CITRATE ORAL) Take 500 mg by mouth 2 (two) times daily.      colchicine (MITIGARE) 0.6 mg Cap Take 1 capsule (0.6 mg total) by mouth daily as needed (flare up). 90 capsule 3    cyanocobalamin (VITAMIN B-12) 1000 MCG tablet Take 100 mcg by mouth every morning.      diclofenac sodium (VOLTAREN) 1 % Gel Apply 2 g topically 4 (four) times daily as needed. To painful area on the feet. 500 g 5    FLUoxetine (PROZAC) 20 mg/5 mL (4 mg/mL) solution Take 5 mLs (20 mg total) by mouth once daily. 450 mL 3    fluticasone propionate (FLONASE) 50 mcg/actuation nasal spray 1 spray (50 mcg total) by Each Nostril route 2 (two) times daily as needed for Rhinitis. 15 g 6    indomethacin (INDOCIN) 50 MG capsule TAKE 1 CAPSULE BY MOUTH 2 TIMES DAILY WITH MEALS 30 capsule 2    LIDOcaine (XYLOCAINE) 5 % Oint ointment Apply topically as needed. 35.44 g 6    MULTIVIT-IRON-MIN-FOLIC ACID 3,500-18-0.4 UNIT-MG-MG ORAL CHEW Take 1 tablet by mouth 2 (two) times daily.       nystatin (MYCOSTATIN) powder Apply topically 2 (two) times daily. 60 g 3    omeprazole (PRILOSEC) 20 MG capsule Take 1 capsule (20 mg total) by mouth every morning. 90 capsule 3    oxybutynin (DITROPAN-XL) 5 MG TR24 Take 1 tablet (5 mg total) by mouth once daily. 90 tablet 3    oxyCODONE-acetaminophen (PERCOCET)  mg per tablet Take 1 tablet by mouth every 8 (eight) hours as needed for Pain. 90 tablet 0    urea (CARMOL) 40 % Crea Apply topically once daily. To dry skin on the feet. 120 g 5    vitamin D 185 MG Tab Take 5,000 mg by mouth every morning.        No current facility-administered medications for this visit.     Facility-Administered Medications Ordered  in Other Visits   Medication Dose Route Frequency Provider Last Rate Last Admin    0.9%  NaCl infusion   Intravenous Continuous Lina Wagner MD 25 mL/hr at 12/15/20 1506 25 mL/hr at 12/15/20 1506       REVIEW OF SYSTEMS:    GENERAL:  No weight loss, malaise or fevers.  HEENT:  Negative for frequent or significant headaches.  NECK:  Negative for lumps, goiter, pain and significant neck swelling.  RESPIRATORY:  Negative for cough, wheezing or shortness of breath.  CARDIOVASCULAR:  Negative for chest pain, leg swelling or palpitations. HTN  GI:  Negative for abdominal discomfort, blood in stools or black stools or change in bowel habits. GERD  MUSCULOSKELETAL:  See HPI.  SKIN:  Negative for lesions, rash, and itching.  PSYCH:  Negative for sleep disturbance, mood disorder and recent psychosocial stressors.  HEMATOLOGY/LYMPHOLOGY:  Negative for prolonged bleeding, bruising easily or swollen nodes.  NEURO:   No history of headaches, syncope, paralysis, seizures or tremors.  ENDO: Diabetes  All other reviewed and negative other than HPI.    Past Medical History:  Past Medical History:   Diagnosis Date    Allergy     Anemia     Asthma     Bilateral shoulder bursitis     Cervical stenosis of spine     Chronic pain     DDD (degenerative disc disease), cervical     Depression 1/28/2019    Diabetes mellitus type II     DJD (degenerative joint disease) of knee 6/19/2014    Facet arthritis of lumbar region 12/17/2015    Facet syndrome 12/17/2015    GERD (gastroesophageal reflux disease)     Heartburn     Hyperlipidemia     Hypertension     Morbid obesity     Neuromuscular disorder     PADDY (obstructive sleep apnea)     Proteinuria     Right carpal tunnel syndrome     Sacroiliitis 6/13/2018    Sacroiliitis     Sleep apnea        Past Surgical History:  Past Surgical History:   Procedure Laterality Date    CARPAL TUNNEL RELEASE      CARPAL TUNNEL RELEASE  1980s    left    CARPAL TUNNEL RELEASE  2012    right    COLONOSCOPY  N/A 6/15/2020    Procedure: COLONOSCOPY;  Surgeon: Jc Koo MD;  Location: [facility] ENDO (4TH FLR);  Service: Endoscopy;  Laterality: N/A;  COVID screening on 6/13/20 at [facility]-rb  pt updated on drop off location and no visitor policy-rb    ESOPHAGOGASTRODUODENOSCOPY N/A 3/27/2019    Procedure: EGD (ESOPHAGOGASTRODUODENOSCOPY);  Surgeon: Robbin Bar MD;  Location: [facility] ENDO (2ND FLR);  Service: Endoscopy;  Laterality: N/A;  BMI 61.3/2nd floor case/svn    INJECTION OF JOINT Bilateral 6/9/2020    Procedure: INJECTION, JOINT, BILATERAL SI;  Surgeon: Lina Wagner MD;  Location: [facility] MGT;  Service: Pain Management;  Laterality: Bilateral;  B/L SI Joint Injection    INJECTION OF JOINT Bilateral 5/11/2021    Procedure: INJECTION, JOINT, SACROILIAC (SI) need consent;  Surgeon: Lina Wagner MD;  Location: [facility] MGT;  Service: Pain Management;  Laterality: Bilateral;    JOINT REPLACEMENT Bilateral     with 2 revisions on rt    KNEE SURGERY  3/2010    orthroscope    KNEE SURGERY  6-19-14    left TKR    LAPAROSCOPIC SLEEVE GASTRECTOMY N/A 8/28/2019    Procedure: GASTRECTOMY, SLEEVE, LAPAROSCOPIC with intraop EGD;  Surgeon: Heriberto Clements MD;  Location: [facility] OR 2ND FLR;  Service: General;  Laterality: N/A;    PANNICULECTOMY N/A 6/14/2022    Procedure: PANNICULECTOMY;  Surgeon: Rhett Gray MD;  Location: [facility] OR 2ND FLR;  Service: Plastics;  Laterality: N/A;    RADIOFREQUENCY ABLATION Right 7/9/2019    Procedure: RADIOFREQUENCY ABLATION;  Surgeon: Lina Wagner MD;  Location: [facility] PAIN MGT;  Service: Pain Management;  Laterality: Right;  RIGHT RFA L2,3,4,5  2 of 2    RADIOFREQUENCY ABLATION Left 12/19/2019    Procedure: RADIOFREQUENCY ABLATION, LEFT L2-L3-L4-L5 MEDIAL BRANCH 1 OF 2;  Surgeon: Lina Wagner MD;  Location: [facility] PAIN MGT;  Service: Pain Management;  Laterality: Left;    RADIOFREQUENCY ABLATION Right 12/31/2019    Procedure: RADIOFREQUENCY ABLATION,  RIGHT L2-L3-L4-L5  2 OF 2;  Surgeon: Lina Wagner MD;  Location: [facility] PAIN MGT;  Service: Pain Management;  Laterality: Right;    RADIOFREQUENCY ABLATION Right 10/13/2020    Procedure: RADIOFREQUENCY ABLATION, Genicular Cooled 1 of 2;  Surgeon: Lina Wagner MD;  Location: [facility] PAIN MGT;  Service: Pain Management;  Laterality: Right;    RADIOFREQUENCY ABLATION Left 11/3/2020    Procedure: RADIOFREQUENCY ABLATION, GENICULAR COOLED  2 OF 2;  Surgeon: Lina Wagner MD;  Location: [facility] PAIN MGT;  Service: Pain Management;  Laterality: Left;    RADIOFREQUENCY ABLATION Left 12/15/2020    Procedure: RADIOFREQUENCY ABLATION LEFT L2,3,4,5 1 of 2;  Surgeon: Lina Wagner MD;  Location: [facility] PAIN MGT;  Service: Pain Management;  Laterality: Left;    RADIOFREQUENCY ABLATION Right 12/29/2020    Procedure: RADIOFREQUENCY ABLATION RIGHT L2,3,4,5 2 of 2;  Surgeon: Lina Wagner MD;  Location: [facility] PAIN MGT;  Service: Pain Management;  Laterality: Right;    RADIOFREQUENCY ABLATION Left 6/29/2021    Procedure: RADIOFREQUENCY ABLATION GENICULAR COOLED;  Surgeon: Lina Wagner MD;  Location: [facility] PAIN MGT;  Service: Pain Management;  Laterality: Left;  1 of 2    RADIOFREQUENCY ABLATION Right 7/13/2021    Procedure: RADIOFREQUENCY ABLATION GENICULAR;  Surgeon: Lina Wagner MD;  Location: [facility] PAIN MGT;  Service: Pain Management;  Laterality: Right;  2 of 2    RADIOFREQUENCY ABLATION Right 8/24/2021    Procedure: RADIOFREQUENCY ABLATION, L2-L3-L4-L5 MEDIAL BRANCH 1 OF 2;  Surgeon: Lina Wagner MD;  Location: [facility] PAIN MGT;  Service: Pain Management;  Laterality: Right;    RADIOFREQUENCY ABLATION Left 9/11/2021    Procedure: RADIOFREQUENCY ABLATION, L2-L3-L4-L5 MEDIAL BR ANCH 2 OF 2;  Surgeon: Lina Wagner MD;  Location: [facility] PAIN MGT;  Service: Pain Management;  Laterality: Left;    RADIOFREQUENCY ABLATION Right 3/7/2022    Procedure: RADIOFREQUENCY ABLATION RIGHT L3,4,5 1 of 2, needs  consent;  Surgeon: Israel Hurtado MD;  Location: [facility] PAIN MGT;  Service: Pain Management;  Laterality: Right;    RADIOFREQUENCY ABLATION Left 3/21/2022    Procedure: RADIOFREQUENCY ABLATION RIGHT L3,4,5 2 of 2, needs consent;  Surgeon: Israel Huratdo MD;  Location: [facility] PAIN MGT;  Service: Pain Management;  Laterality: Left;    RADIOFREQUENCY ABLATION Right 5/9/2022    Procedure: RADIOFREQUENCY ABLATION RIGHT GENICULAR COOLED 1 of 2;  Surgeon: Israel Hurtado MD;  Location: [facility] PAIN MGT;  Service: Pain Management;  Laterality: Right;    RADIOFREQUENCY ABLATION Left 5/23/2022    Procedure: RADIOFREQUENCY ABLATION LEFT GENICULAR COOLED  2 of 2;  Surgeon: Israel Hurtado MD;  Location: [facility] PAIN MGT;  Service: Pain Management;  Laterality: Left;    RADIOFREQUENCY ABLATION Right 12/12/2022    Procedure: RADIOFREQUENCY ABLATION RIGHT GENICULAR COOLED  ONE OF TWO  NEEDS CONSENT;  Surgeon: Israel Hurtado MD;  Location: [facility] PAIN MGT;  Service: Pain Management;  Laterality: Right;    RADIOFREQUENCY ABLATION Left 1/9/2023    Procedure: RADIOFREQUENCY ABLATION LEFT GENICULAR COOLED  TWO OF TWO NEEDS CONSENT;  Surgeon: Israel Hurtado MD;  Location: [facility] PAIN MGT;  Service: Pain Management;  Laterality: Left;    RADIOFREQUENCY ABLATION Right 3/6/2023    Procedure: RADIOFREQUENCY ABLATION RIGHT L3,L4,L5;  Surgeon: Israel Hurtado MD;  Location: [facility] PAIN MGT;  Service: Pain Management;  Laterality: Right;    RADIOFREQUENCY ABLATION OF LUMBAR MEDIAL BRANCH NERVE AT SINGLE LEVEL Left 6/26/2018    Procedure: RADIOFREQUENCY ABLATION, NERVE, MEDIAL BRANCH, LUMBAR, 1 LEVEL;  Surgeon: Lina Wagner MD;  Location: [facility] PAIN MGT;  Service: Pain Management;  Laterality: Left;  Left Cooled RFA @ L2,3,4,5  [id number]  with Sedation    1 of 2    RADIOFREQUENCY ABLATION OF LUMBAR MEDIAL BRANCH NERVE AT SINGLE LEVEL Right 7/10/2018    Procedure: RADIOFREQUENCY ABLATION, NERVE, MEDIAL BRANCH, LUMBAR, 1 LEVEL;  Surgeon: Lina Wagner MD;  Location:  [facility] [place] PAIN MGT;  Service: Pain Management;  Laterality: Right;  RIght Cooled RFA @ L2,3,4,5  [id number] with Sedation    2 of 2    TRIGGER FINGER RELEASE Right 2017    TRIGGER FINGER RELEASE Left 7/29/2019    Procedure: RELEASE, TRIGGER FINGER, Left Thumb;  Surgeon: Velma Garcia MD;  Location: [facility] [place] OR;  Service: Orthopedics;  Laterality: Left;  Local w/ MAC       Family History:  Family History   Problem Relation Age of Onset    Diabetes Mother     Cataracts Mother     Diabetes Father     Cataracts Father     Hypertension Sister     Diabetes Sister     No Known Problems Sister     Cancer Sister         lymphoma     Coronary artery disease Brother     Diabetes Brother     Diabetes Brother     Hypotension Brother     Kidney failure Brother     Hypotension Brother     Amblyopia Neg Hx     Blindness Neg Hx     Glaucoma Neg Hx     Macular degeneration Neg Hx     Retinal detachment Neg Hx     Strabismus Neg Hx     Stroke Neg Hx     Thyroid disease Neg Hx     Anesthesia problems Neg Hx        Social History:  Social History     Socioeconomic History    Marital status:    Tobacco Use    Smoking status: Never    Smokeless tobacco: Never   Substance and Sexual Activity    Alcohol use: Yes     Comment: occasionally     Drug use: No    Sexual activity: Yes     Partners: Female     Birth control/protection: None   Social History Narrative    Disabled. The patient is the youngest of 6 siblings. Single. Lives with single-sex partner.                    OBJECTIVE:    Exam limited due to virtual visit:  GENERAL: Well appearing, in no acute distress, alert and oriented x3.   PSYCH:  Mood and affect appropriate.    Previous physical exam:  [facility] 06/26/2018     PHYSICAL EXAMINATION:    GENERAL: Well appearing, in no acute distress, alert and oriented x3.   PSYCH:  Mood and affect appropriate.  SKIN: Skin color, texture, turgor normal, no rashes or lesions.   HEAD/FACE:  Normocephalic, atraumatic.   CV: RRR with palpation  of the radial artery.  PULM: No evidence of respiratory difficulty, symmetric chest rise.  BACK: Negative SLR bilaterally. SLR does cause back pain. There is pain with palpation over lumbar facet joints bilaterally. Limited ROM with pain on extension.  Positive facet loading bilaterally  EXTREMITIES: There is pain with palpation to bilateral SI joints.  JENNA is positive bilaterally. Well-healed midline scars to bilateral knees.  There is pain with extension of bilateral knees.    MUSCULOSKELETAL: Bilateral upper and lower extremity strength is normal and symmetric.  No atrophy or tone abnormalities are noted.  NEURO: Bilateral lower extremity coordination and muscle stretch reflexes are physiologic and symmetric.  Plantar response are downgoing. No clonus.  No loss of sensation is noted.  GAIT: Antalgic- ambulates without assistance.      ASSESSMENT: 57 y.o. year old female with low back and knee pain, consistent with the followin. Chronic pain disorder        2. Spondylosis of lumbosacral region without myelopathy or radiculopathy        3. Intractable back pain        4. DDD (degenerative disc disease), lumbar        5. Chronic pain of both knees        6. Status post total bilateral knee replacement                PLAN:     - Previous imaging reviewed today.    - She is s/p right L3,4,5 RFA with benefit. She is scheduled for left L3,4,5 RFA.     - We can repeat bilateral genicular RFA as needed.     - Continue Percocet 10/325 mg TID PRN. Refill provided with appropriate date.     - The patient is here today for a refill of current pain medications and they believe these provide effective pain control and improvements in quality of life.  The patient notes no serious side effects, and feels the benefits outweigh the risks.  The patient was reminded of the pain contract that they signed previously as well as the risks and benefits of the medication including possible death.  The updated [place] Board   Pharmacy prescription monitoring program was reviewed, and the patient has been found to be compliant with current treatment plan. Medication management provided by Dr. Llanes in absence of Dr. Hurtado.     - Previous UDS from 11/22/2022 reviewed and consistent.     - I have stressed the importance of physical activity and a home exercise plan to help with pain and improve health.    - Continue physical therapy.     - RTC 3 weeks after above procedures.     - Counseled patient regarding the importance of activity modification and constant sleeping habits.    The above plan and management options were discussed at length with patient. Patient is in agreement with the above and verbalized understanding.    Adeola Robledo  04/10/2023

## 2023-04-11 ENCOUNTER — PATIENT MESSAGE (OUTPATIENT)
Dept: BARIATRICS | Facility: CLINIC | Age: 58
End: 2023-04-11
Payer: MEDICARE

## 2023-04-13 ENCOUNTER — PATIENT MESSAGE (OUTPATIENT)
Dept: PAIN MEDICINE | Facility: OTHER | Age: 58
End: 2023-04-13
Payer: MEDICARE

## 2023-05-03 ENCOUNTER — PATIENT MESSAGE (OUTPATIENT)
Dept: BARIATRICS | Facility: CLINIC | Age: 58
End: 2023-05-03
Payer: MEDICARE

## 2023-05-03 DIAGNOSIS — Z71.89 COMPLEX CARE COORDINATION: ICD-10-CM

## 2023-05-03 NOTE — TELEPHONE ENCOUNTER
----- Message from Camilla Morel sent at 5/3/2023  2:29 PM CDT -----  Pt called in stating she would like a 3 dose of diflucan called in if possible. Pls call the pt back and advise.

## 2023-05-08 RX ORDER — FLUTICASONE PROPIONATE 50 MCG
SPRAY, SUSPENSION (ML) NASAL
Qty: 48 G | Refills: 3 | Status: SHIPPED | OUTPATIENT
Start: 2023-05-08

## 2023-05-08 RX ORDER — FLUCONAZOLE 150 MG/1
150 TABLET ORAL
Qty: 3 TABLET | Refills: 0 | Status: ON HOLD | OUTPATIENT
Start: 2023-05-08 | End: 2023-08-08 | Stop reason: ALTCHOICE

## 2023-05-08 NOTE — TELEPHONE ENCOUNTER
No care due was identified.  [facility] Embedded Care Due Messages. Reference number: [id number].   5/08/2023 4:36:12 PM CDT

## 2023-05-08 NOTE — TELEPHONE ENCOUNTER
Refill Decision Note   Alivia Marie Cyr  is requesting a refill authorization.  Brief Assessment and Rationale for Refill:  Approve     Medication Therapy Plan:  previous order matches    Medication Reconciliation Completed: No   Comments:     No Care Gaps recommended.     Note composed:4:45 PM 05/08/2023

## 2023-05-09 ENCOUNTER — TELEPHONE (OUTPATIENT)
Dept: OBSTETRICS AND GYNECOLOGY | Facility: CLINIC | Age: 58
End: 2023-05-09
Payer: MEDICARE

## 2023-05-09 ENCOUNTER — PATIENT MESSAGE (OUTPATIENT)
Dept: BARIATRICS | Facility: CLINIC | Age: 58
End: 2023-05-09
Payer: MEDICARE

## 2023-05-09 DIAGNOSIS — G89.4 CHRONIC PAIN SYNDROME: ICD-10-CM

## 2023-05-09 DIAGNOSIS — M47.816 LUMBAR SPONDYLOSIS: Primary | ICD-10-CM

## 2023-05-09 NOTE — TELEPHONE ENCOUNTER
Patient requesting refill on Percocet 10/325mg   Last office visit 04/10/23   shows last refill on 04/12/23  Patient does have a pain contract on file with [facility] department  Patient last UDS 11/22/22 was consistent with current therapy  CODEINE  Not Detected  Not Detected   Not Detected   Not Detected  Not Detected    MORPHINE  Not Detected  Not Detected   Not Detected   Not Detected  Not Detected    6-ACETYLMORPHINE  Not Detected  Not Detected   Not Detected   Not Detected  Not Detected    OXYCODONE  Present  Present   Not Detected   Not Detected  Present    NOROYXCODONE  Present  Present   Present   Present  Present    OXYMORPHONE  Present  Present   Not Detected   Not Detected  Not Detected    NOROXYMORPHONE  Not Detected  Not Detected   Not Detected   Not Detected  Not Detected    HYDROCODONE  Not Detected  Not Detected   Not Detected   Not Detected  Not Detected    NORHYDROCODONE  Not Detected  Not Detected   Not Detected   Not Detected  Not Detected    HYDROMORPHONE  Not Detected  Not Detected   Not Detected   Not Detected  Not Detected    BUPRENORPHINE  Not Detected  Not Detected   Not Detected   Not Detected  Not Detected    NORUBPRENORPHINE  Not Detected  Not Detected   Not Detected   Not Detected  Not Detected    FENTANYL  Not Detected  Not Detected   Not Detected   Not Detected  Not Detected    NORFENTANYL  Not Detected  Not Detected   Not Detected   Not Detected  Not Detected    MEPERIDINE METABOLITE  Not Detected  Not Detected   Not Detected   Not Detected  Not Detected    TAPENTADOL  Not Detected  Not Detected   Not Detected   Not Detected  Not Detected    TAPENTADOL-O-SULF  Not Detected  Not Detected   Not Detected   Not Detected  Not Detected    METHADONE  Not Detected  Not Detected   Not Detected   Not Detected  Not Detected    TRAMADOL  Not Detected  Not Detected   Not Detected   Not Detected  Not Detected    AMPHETAMINE  Not Detected  Not Detected   Not Detected   Not  Detected  Not Detected    METHAMPHETAMINE  Not Detected  Not Detected   Not Detected   Not Detected  Not Detected    MDMA- ECSTASY  Not Detected  Not Detected   Not Detected   Not Detected  Not Detected    MDA  Not Detected  Not Detected   Not Detected   Not Detected  Not Detected    MDEA- Kait  Not Detected  Not Detected   Not Detected   Not Detected  Not Detected    METHYLPHENIDATE  Not Detected  Not Detected   Not Detected   Not Detected  Not Detected    PHENTERMINE  Not Detected  Not Detected   Not Detected   Not Detected  Not Detected    BENZOYLECGONINE  Not Detected  Not Detected   Not Detected   Not Detected  Not Detected    ALPRAZOLAM  Not Detected  Not Detected   Not Detected   Not Detected  Not Detected    ALPHA-OH-ALPRAZOLAM  Not Detected  Not Detected   Not Detected   Not Detected  Not Detected    CLONAZEPAM  Not Detected  Not Detected   Not Detected   Not Detected  Not Detected    7-AMINOCLONAZEPAM  Not Detected  Not Detected   Not Detected   Not Detected  Not Detected    DIAZEPAM  Not Detected  Not Detected   Not Detected   Not Detected  Not Detected    NORDIAZEPAM  Not Detected  Not Detected   Not Detected   Not Detected  Not Detected    OXAZEPAM  Not Detected  Not Detected   Not Detected   Not Detected  Not Detected    TEMAZEPAM  Not Detected  Not Detected   Not Detected   Not Detected  Not Detected    Lorazepam  Not Detected  Not Detected   Not Detected   Not Detected  Not Detected    MIDAZOLAM  Not Detected  Not Detected   Not Detected   Not Detected  Not Detected    ZOLPIDEM  Not Detected  Not Detected   Not Detected   Not Detected  Not Detected    BARBITURATES  Not Detected  Not Detected   Not Detected   Not Detected  Not Detected    Creatinine, Urine 20.0 - 400.0 mg/dL 112.3  93.0  86.0 R, CM  66.1  113.0 R, CM  91.8  163.4    ETHYL GLUCURONIDE  Not Detected  Not Detected   Not Detected   Not Detected  Not Detected    MARIJUANA METABOLITE  Not Detected  Not Detected   Not Detected   Not Detected   Not Detected    PCP  Not Detected  Not Detected   Not Detected   Not Detected  Not Detected    CARISOPRODOL  Not Detected  Not Detected CM   Not Detected CM   Not Detected CM  Not Detected CM    Comment: The carisoprodol immunoassay has cross-reactivity to   carisoprodol and meprobamate.    Naloxone  Not Detected  Not Detected         Gabapentin  Not Detected  Not Detected         Pregabalin  Not Detected  Not Detected         Alpha-OH-Midazolam  Not Detected  Not Detected         Zolpidem Metabolite  Not Detected  Not Detected

## 2023-05-10 RX ORDER — OXYCODONE AND ACETAMINOPHEN 10; 325 MG/1; MG/1
1 TABLET ORAL EVERY 8 HOURS PRN
Qty: 90 TABLET | Refills: 0 | Status: SHIPPED | OUTPATIENT
Start: 2023-05-12 | End: 2023-06-12 | Stop reason: SDUPTHER

## 2023-05-22 ENCOUNTER — HOSPITAL ENCOUNTER (OUTPATIENT)
Facility: OTHER | Age: 58
Discharge: HOME | End: 2023-05-22
Attending: ANESTHESIOLOGY | Admitting: ANESTHESIOLOGY
Payer: MEDICARE

## 2023-05-22 ENCOUNTER — OFFICE VISIT (OUTPATIENT)
Dept: PODIATRY | Facility: CLINIC | Age: 58
End: 2023-05-22
Payer: MEDICARE

## 2023-05-22 VITALS
HEIGHT: 65 IN | BODY MASS INDEX: 39.15 KG/M2 | HEART RATE: 59 BPM | SYSTOLIC BLOOD PRESSURE: 144 MMHG | WEIGHT: 235 LBS | DIASTOLIC BLOOD PRESSURE: 80 MMHG

## 2023-05-22 VITALS
SYSTOLIC BLOOD PRESSURE: 142 MMHG | DIASTOLIC BLOOD PRESSURE: 65 MMHG | HEART RATE: 55 BPM | BODY MASS INDEX: 39.15 KG/M2 | HEIGHT: 65 IN | OXYGEN SATURATION: 96 % | TEMPERATURE: 97 F | RESPIRATION RATE: 16 BRPM | WEIGHT: 235 LBS

## 2023-05-22 DIAGNOSIS — M47.816 OSTEOARTHRITIS OF LUMBAR SPINE, UNSPECIFIED SPINAL OSTEOARTHRITIS COMPLICATION STATUS: Primary | ICD-10-CM

## 2023-05-22 DIAGNOSIS — M47.817 SPONDYLOSIS OF LUMBOSACRAL REGION WITHOUT MYELOPATHY OR RADICULOPATHY: ICD-10-CM

## 2023-05-22 DIAGNOSIS — E11.9 ENCOUNTER FOR DIABETIC FOOT EXAM: Primary | ICD-10-CM

## 2023-05-22 DIAGNOSIS — E11.49 TYPE II DIABETES MELLITUS WITH NEUROLOGICAL MANIFESTATIONS: ICD-10-CM

## 2023-05-22 DIAGNOSIS — G89.29 CHRONIC PAIN: ICD-10-CM

## 2023-05-22 LAB — POCT GLUCOSE: 84 MG/DL (ref 70–110)

## 2023-05-22 PROCEDURE — 99999 PR PBB SHADOW E&M-EST. PATIENT-LVL IV: ICD-10-PCS | Mod: PBBFAC,,, | Performed by: PODIATRIST

## 2023-05-22 PROCEDURE — 99213 OFFICE O/P EST LOW 20 MIN: CPT | Mod: S$PBB,,, | Performed by: PODIATRIST

## 2023-05-22 PROCEDURE — 64635 DESTROY LUMB/SAC FACET JNT: CPT | Mod: LT | Performed by: ANESTHESIOLOGY

## 2023-05-22 PROCEDURE — 99152 PR MOD CONSCIOUS SEDATION, SAME PHYS, 5+ YRS, FIRST 15 MIN: ICD-10-PCS | Mod: ,,, | Performed by: ANESTHESIOLOGY

## 2023-05-22 PROCEDURE — 99214 OFFICE O/P EST MOD 30 MIN: CPT | Mod: PBBFAC,25,PN | Performed by: PODIATRIST

## 2023-05-22 PROCEDURE — 64635 PR DESTROY LUMB/SAC FACET JNT: ICD-10-PCS | Mod: LT,,, | Performed by: ANESTHESIOLOGY

## 2023-05-22 PROCEDURE — 64636 PR DESTROY L/S FACET JNT ADDL: ICD-10-PCS | Mod: LT,,, | Performed by: ANESTHESIOLOGY

## 2023-05-22 PROCEDURE — 99152 MOD SED SAME PHYS/QHP 5/>YRS: CPT | Performed by: ANESTHESIOLOGY

## 2023-05-22 PROCEDURE — 25000003 PHARM REV CODE 250: Performed by: ANESTHESIOLOGY

## 2023-05-22 PROCEDURE — 64635 DESTROY LUMB/SAC FACET JNT: CPT | Mod: LT,,, | Performed by: ANESTHESIOLOGY

## 2023-05-22 PROCEDURE — 99152 MOD SED SAME PHYS/QHP 5/>YRS: CPT | Mod: ,,, | Performed by: ANESTHESIOLOGY

## 2023-05-22 PROCEDURE — 25000003 PHARM REV CODE 250: Performed by: STUDENT IN AN ORGANIZED HEALTH CARE EDUCATION/TRAINING PROGRAM

## 2023-05-22 PROCEDURE — 99999 PR PBB SHADOW E&M-EST. PATIENT-LVL IV: CPT | Mod: PBBFAC,,, | Performed by: PODIATRIST

## 2023-05-22 PROCEDURE — 63600175 PHARM REV CODE 636 W HCPCS: Performed by: ANESTHESIOLOGY

## 2023-05-22 PROCEDURE — 64636 DESTROY L/S FACET JNT ADDL: CPT | Mod: LT,,, | Performed by: ANESTHESIOLOGY

## 2023-05-22 PROCEDURE — 99213 PR OFFICE/OUTPT VISIT, EST, LEVL III, 20-29 MIN: ICD-10-PCS | Mod: S$PBB,,, | Performed by: PODIATRIST

## 2023-05-22 PROCEDURE — 64636 DESTROY L/S FACET JNT ADDL: CPT | Mod: LT | Performed by: ANESTHESIOLOGY

## 2023-05-22 RX ORDER — MIDAZOLAM HYDROCHLORIDE 1 MG/ML
INJECTION INTRAMUSCULAR; INTRAVENOUS
Status: DISCONTINUED | OUTPATIENT
Start: 2023-05-22 | End: 2023-05-22 | Stop reason: HOSPADM

## 2023-05-22 RX ORDER — BUPIVACAINE HYDROCHLORIDE 5 MG/ML
INJECTION, SOLUTION EPIDURAL; INTRACAUDAL
Status: DISCONTINUED | OUTPATIENT
Start: 2023-05-22 | End: 2023-05-22 | Stop reason: HOSPADM

## 2023-05-22 RX ORDER — FENTANYL CITRATE 50 UG/ML
INJECTION, SOLUTION INTRAMUSCULAR; INTRAVENOUS
Status: DISCONTINUED | OUTPATIENT
Start: 2023-05-22 | End: 2023-05-22 | Stop reason: HOSPADM

## 2023-05-22 RX ORDER — LIDOCAINE HYDROCHLORIDE 20 MG/ML
INJECTION, SOLUTION INFILTRATION; PERINEURAL
Status: DISCONTINUED | OUTPATIENT
Start: 2023-05-22 | End: 2023-05-22 | Stop reason: HOSPADM

## 2023-05-22 RX ORDER — SODIUM CHLORIDE 9 MG/ML
500 INJECTION, SOLUTION INTRAVENOUS CONTINUOUS
Status: ACTIVE | OUTPATIENT
Start: 2023-05-22 | End: 2023-05-23

## 2023-05-22 RX ORDER — DEXAMETHASONE SODIUM PHOSPHATE 10 MG/ML
INJECTION INTRAMUSCULAR; INTRAVENOUS
Status: DISCONTINUED | OUTPATIENT
Start: 2023-05-22 | End: 2023-05-22 | Stop reason: HOSPADM

## 2023-05-22 NOTE — PROGRESS NOTES
Chief Complaint   Patient presents with    Diabetic Foot Exam     Foot Exam/PCP Clarisse Richardson MD  03/13/23           HPI:   The patient is a 57 y.o.  female  who presents for a diabetic foot exam/care   Patient reports + presence of abnormal sensation to the feet, just sometimes, mostly at night.   Patient has no history of wounds on the feet.   Hx of foot surgery: none.     This patient has documented high risk feet requiring routine maintenance secondary to diabetes.            Primary care doctor is: Clarisse Richardson MD  Patient last saw primary care doctor on:  3/13/2023        Patient Active Problem List   Diagnosis    Morbid obesity    PADDY (obstructive sleep apnea)    Type 2 diabetes mellitus without complication, without long-term current use of insulin    Nuclear sclerosis - Both Eyes    Hyperlipemia    Proteinuria    Type 2 diabetes mellitus without retinopathy - Both Eyes    Bilateral knee pain    DJD (degenerative joint disease) of knee    Debility    Prosthetic joint implant failure    Failed total knee arthroplasty    Right knee pain    Knee pain    History of total left knee replacement    Lumbago    CTS (carpal tunnel syndrome)    Right carpal tunnel syndrome    Chronic, continuous use of opioids    Mild intermittent asthma without complication    Left arm pain    Left shoulder pain    Allergic reaction to food    DDD (degenerative disc disease), cervical    Cervical radiculopathy    Cervical spondylosis    Cubital tunnel syndrome on right    Bilateral shoulder bursitis    Cervical stenosis of spinal canal    DDD (degenerative disc disease), thoracic    Thoracic spondylosis    Spondylolisthesis of lumbar region    Lumbar spondylosis    Spondylolisthesis of cervical region    Cervical spine instability    Myeloradiculopathy    Neural foraminal stenosis of cervical spine    DDD (degenerative disc disease), lumbar    Idiopathic scoliosis of thoracolumbar spine    Bilateral shoulder region  arthritis    Impingement syndrome of right shoulder    Trochanteric bursitis of both hips    Chronic pain    Depression    Recurrent major depressive disorder, in partial remission    GERD (gastroesophageal reflux disease)    Trigger finger    Dyspnea    BMI 45.0-49.9, adult    Sacroiliac joint pain    Spondylosis of lumbosacral region without myelopathy or radiculopathy    Subacromial bursitis of left shoulder joint    Sacroiliitis    Snoring    BMI 39.0-39.9,adult    S/P panniculectomy    Gout    History of sleeve gastrectomy    History of total right knee replacement    Noncompliance with CPAP treatment    Osteoarthritis of lumbar spine    Aortic atherosclerosis           Current Outpatient Medications on File Prior to Visit   Medication Sig Dispense Refill    albuterol (VENTOLIN HFA) 90 mcg/actuation inhaler INHALE TWO PUFFS INTO LUNGS EVERY 4 TO 6 HOURS AS NEEDED FOR SHORTNESS OF BREATH AND FOR WHEEZING 18 g 1    allopurinoL (ZYLOPRIM) 300 MG tablet Take 1 tablet (300 mg total) by mouth 2 (two) times daily. 180 tablet 3    atorvastatin (LIPITOR) 20 MG tablet Take 1 tablet (20 mg total) by mouth once daily. 90 tablet 3    b complex vitamins tablet Take 1 tablet by mouth every other day.       calcium citrate/vitamin D2 (ISIAH-CITRATE ORAL) Take 500 mg by mouth 2 (two) times daily.      colchicine (MITIGARE) 0.6 mg Cap Take 1 capsule (0.6 mg total) by mouth daily as needed (flare up). 90 capsule 3    cyanocobalamin (VITAMIN B-12) 1000 MCG tablet Take 100 mcg by mouth every morning.      diclofenac sodium (VOLTAREN) 1 % Gel Apply 2 g topically 4 (four) times daily as needed. To painful area on the feet. 500 g 5    fluconazole (DIFLUCAN) 150 MG Tab Take 1 tablet (150 mg total) by mouth Every 3 (three) days. 3 tablet 0    FLUoxetine (PROZAC) 20 mg/5 mL (4 mg/mL) solution TAKE 5 MLS BY MOUTH ONCE DAILY. 450 mL 3    fluticasone propionate (FLONASE) 50 mcg/actuation nasal spray 1 SPRAY BY EACH NOSTRIL ROUTE 2 TIMES DAILY  AS NEEDED FOR RHINITIS. 48 g 3    indomethacin (INDOCIN) 50 MG capsule TAKE 1 CAPSULE BY MOUTH 2 TIMES DAILY WITH MEALS 30 capsule 2    LIDOcaine (XYLOCAINE) 5 % Oint ointment Apply topically as needed. 35.44 g 6    MULTIVIT-IRON-MIN-FOLIC ACID 3,500-18-0.4 UNIT-MG-MG ORAL CHEW Take 1 tablet by mouth 2 (two) times daily.       nystatin (MYCOSTATIN) powder Apply topically 2 (two) times daily. 60 g 3    omeprazole (PRILOSEC) 20 MG capsule Take 1 capsule (20 mg total) by mouth every morning. 90 capsule 3    oxybutynin (DITROPAN-XL) 5 MG TR24 TAKE 1 TABLET BY MOUTH ONCE DAILY. 90 tablet 3    oxyCODONE-acetaminophen (PERCOCET)  mg per tablet Take 1 tablet by mouth every 8 (eight) hours as needed for Pain. 90 tablet 0    urea (CARMOL) 40 % Crea Apply topically once daily. To dry skin on the feet. 120 g 5    vitamin D 185 MG Tab Take 5,000 mg by mouth every morning.        Current Facility-Administered Medications on File Prior to Visit   Medication Dose Route Frequency Provider Last Rate Last Admin    0.9%  NaCl infusion   Intravenous Continuous Lina Wagner MD 25 mL/hr at 12/15/20 1506 25 mL/hr at 12/15/20 1506    0.9%  NaCl infusion  500 mL Intravenous Continuous Davin Ybarra DO 25 mL/hr at 05/22/23 1029 500 mL at 05/22/23 1029    [DISCONTINUED] BUPivacaine (PF) 0.5% (5 mg/mL) injection    PRCHRISTOFER Hurtado MD   10 mL at 05/22/23 1039    [DISCONTINUED] dexAMETHasone sodium phos (PF) injection    PRCHRISTOFER Hurtado MD   10 mg at 05/22/23 1037    [DISCONTINUED] fentaNYL injection    PRCHRISTOFER Hurtado MD   25 mcg at 05/22/23 1043    [DISCONTINUED] LIDOcaine HCL 20 mg/ml (2%) injection    PRCHRISTOFER Hurtado MD   10 mL at 05/22/23 1029    [DISCONTINUED] midazolam (VERSED) 1 mg/mL injection    PRCHRISTOFER Hurtado MD   2 mg at 05/22/23 1043       Review of patient's allergies indicates:  No Known Allergies                  ROS:  General ROS: negative  Respiratory ROS: no cough, shortness of breath, or  "wheezing  Cardiovascular ROS: no chest pain or dyspnea on exertion  Musculoskeletal ROS: negative  Neurological ROS:   negative for - gait disturbance, + numbness/tingling, seizures or tremors  Dermatological ROS: + nail changes, dry skin          LAST HbA1c:   Hemoglobin A1C   Date Value Ref Range Status   03/13/2023 6.3 (H) 4.0 - 5.6 % Final     Comment:     ADA Screening Guidelines:  5.7-6.4%  Consistent with prediabetes  >or=6.5%  Consistent with diabetes    High levels of fetal hemoglobin interfere with the HbA1C  assay. Heterozygous hemoglobin variants (HbS, HgC, etc)do  not significantly interfere with this assay.   However, presence of multiple variants may affect accuracy.     05/09/2022 5.9 (H) 4.0 - 5.6 % Final     Comment:     ADA Screening Guidelines:  5.7-6.4%  Consistent with prediabetes  >or=6.5%  Consistent with diabetes    High levels of fetal hemoglobin interfere with the HbA1C  assay. Heterozygous hemoglobin variants (HbS, HgC, etc)do  not significantly interfere with this assay.   However, presence of multiple variants may affect accuracy.     11/08/2021 6.3 (H) 4.0 - 5.6 % Final     Comment:     ADA Screening Guidelines:  5.7-6.4%  Consistent with prediabetes  >or=6.5%  Consistent with diabetes    High levels of fetal hemoglobin interfere with the HbA1C  assay. Heterozygous hemoglobin variants (HbS, HgC, etc)do  not significantly interfere with this assay.   However, presence of multiple variants may affect accuracy.           EXAM:   Vitals:    05/22/23 1218   BP: (!) 144/80   Pulse: (!) 59   Weight: 106.6 kg (235 lb)   Height: 5' 5" (1.651 m)       General: Pt. is well-developed, well-nourished, appears stated age, in no acute distress, alert and oriented x 3.      Vascular: Dorsalis pedis and posterior tibial pulses are 2/4 bilaterally. 3 secs capillary refill time and toes are warm to touch.    There is reduced hair growth on the feet.    There is 1+ edema.  no varicosities.  "     Neurological:  Light touch, proprioception, and sharp/dull sensation are all intact bilaterally. Protective threshold with the Irving-Lindsay monofilament is diminished bilaterally.   +paresthesias      Dermatological:  The skin is thin    The toenails  1-5 L and 1-5 R  are greenish- yellow, long by 5-6mm and thickened by 2-3mm with subungual debris  .   There are no open wounds. There is no ecchymoses.  no erythema noted.   Skin diffusely dry on the soles     Interdigital spaces are intact, no fissures    Skin atrophic, thin, dry and mildly hyperpigmented.       Musculoskeletal:    Muscle strength is 5/5 in all groups bilaterally.    Metatarsophalangeal, subtalar, and ankle range of motion are intact without crepitus.     Ankle equinus bilateral       Assessment / Plan:      ICD-10-CM ICD-9-CM    1. Encounter for diabetic foot exam  E11.9 250.00       2. Type II diabetes mellitus with neurological manifestations  E11.49 250.60           I counseled the patient on the patient's conditions, their implications and medical management.  Shoe inspection.     Continue good nutrition and blood sugar control to help prevent podiatric complications of diabetes.   Maintain proper foot hygiene.   Continue wearing proper shoe gear, daily foot inspections, never walking without protective shoe gear, never putting sharp instruments to feet.  Shoe and activity modification as needed for relief.

## 2023-05-22 NOTE — DISCHARGE SUMMARY
Discharge Note  Short Stay      SUMMARY     Admit Date: 5/22/2023    Attending Physician: Israel Hurtado      Discharge Physician: Israel Hurtado      Discharge Date: 5/22/2023 10:35 AM    Procedure(s) (LRB):  RADIOFREQUENCY ABLATION LEFT L3,L4,L5 (Left)    Final Diagnosis: Spondylosis of lumbar region without myelopathy or radiculopathy [M47.816]    Disposition: Home or self care    Patient Instructions:   Current Discharge Medication List        CONTINUE these medications which have NOT CHANGED    Details   allopurinoL (ZYLOPRIM) 300 MG tablet Take 1 tablet (300 mg total) by mouth 2 (two) times daily.  Qty: 180 tablet, Refills: 3    Associated Diagnoses: Gout, unspecified cause, unspecified chronicity, unspecified site      albuterol (VENTOLIN HFA) 90 mcg/actuation inhaler INHALE TWO PUFFS INTO LUNGS EVERY 4 TO 6 HOURS AS NEEDED FOR SHORTNESS OF BREATH AND FOR WHEEZING  Qty: 18 g, Refills: 1    Comments: Please consider 90 day supplies to promote better adherence  Associated Diagnoses: Asthma, well controlled, mild intermittent      atorvastatin (LIPITOR) 20 MG tablet Take 1 tablet (20 mg total) by mouth once daily.  Qty: 90 tablet, Refills: 3      b complex vitamins tablet Take 1 tablet by mouth every other day.       calcium citrate/vitamin D2 (ISIAH-CITRATE ORAL) Take 500 mg by mouth 2 (two) times daily.      colchicine (MITIGARE) 0.6 mg Cap Take 1 capsule (0.6 mg total) by mouth daily as needed (flare up).  Qty: 90 capsule, Refills: 3    Associated Diagnoses: Gout, unspecified cause, unspecified chronicity, unspecified site      cyanocobalamin (VITAMIN B-12) 1000 MCG tablet Take 100 mcg by mouth every morning.      diclofenac sodium (VOLTAREN) 1 % Gel Apply 2 g topically 4 (four) times daily as needed. To painful area on the feet.  Qty: 500 g, Refills: 5    Comments: 5 x 100g tubes  Associated Diagnoses: Right foot pain; Enthesopathy of foot      fluconazole (DIFLUCAN) 150 MG Tab Take 1 tablet (150 mg total) by mouth  Every 3 (three) days.  Qty: 3 tablet, Refills: 0      FLUoxetine (PROZAC) 20 mg/5 mL (4 mg/mL) solution TAKE 5 MLS BY MOUTH ONCE DAILY.  Qty: 450 mL, Refills: 3      fluticasone propionate (FLONASE) 50 mcg/actuation nasal spray 1 SPRAY BY EACH NOSTRIL ROUTE 2 TIMES DAILY AS NEEDED FOR RHINITIS.  Qty: 48 g, Refills: 3      indomethacin (INDOCIN) 50 MG capsule TAKE 1 CAPSULE BY MOUTH 2 TIMES DAILY WITH MEALS  Qty: 30 capsule, Refills: 2    Associated Diagnoses: Right foot pain; Enthesopathy of foot      LIDOcaine (XYLOCAINE) 5 % Oint ointment Apply topically as needed.  Qty: 35.44 g, Refills: 6    Associated Diagnoses: Right foot pain      MULTIVIT-IRON-MIN-FOLIC ACID 3,500-18-0.4 UNIT-MG-MG ORAL CHEW Take 1 tablet by mouth 2 (two) times daily.       nystatin (MYCOSTATIN) powder Apply topically 2 (two) times daily.  Qty: 60 g, Refills: 3      omeprazole (PRILOSEC) 20 MG capsule Take 1 capsule (20 mg total) by mouth every morning.  Qty: 90 capsule, Refills: 3    Associated Diagnoses: Gastroesophageal reflux disease without esophagitis      oxybutynin (DITROPAN-XL) 5 MG TR24 TAKE 1 TABLET BY MOUTH ONCE DAILY.  Qty: 90 tablet, Refills: 3    Associated Diagnoses: Mixed incontinence urge and stress (male)(female)      oxyCODONE-acetaminophen (PERCOCET)  mg per tablet Take 1 tablet by mouth every 8 (eight) hours as needed for Pain.  Qty: 90 tablet, Refills: 0    Comments: Quantity prescribed more than 7 day supply? Yes, quantity medically necessary  Associated Diagnoses: Lumbar spondylosis; Chronic pain syndrome      urea (CARMOL) 40 % Crea Apply topically once daily. To dry skin on the feet.  Qty: 120 g, Refills: 5    Associated Diagnoses: Dry skin      vitamin D 185 MG Tab Take 5,000 mg by mouth every morning.                  Discharge Diagnosis: Spondylosis of lumbar region without myelopathy or radiculopathy [M47.816]  Condition on Discharge: Stable with no complications to procedure   Diet on Discharge: Same as  before.  Activity: as per instruction sheet.  Discharge to: Home with a responsible adult.  Follow up: 2-4 weeks       Please call my office or pager at [phone] if experienced any weakness or loss of sensation, fever > 101.5, pain uncontrolled with oral medications, persistent nausea/vomiting/or diarrhea, redness or drainage from the incisions, or any other worrisome concerns. If physician on call was not reached or could not communicate with our office for any reason please go to the nearest emergency department

## 2023-05-22 NOTE — DISCHARGE INSTRUCTIONS

## 2023-05-22 NOTE — OP NOTE
Therapeutic Lumbar Medial Branch Radiofrequency Ablation under Fluoroscopy     The procedure, risks, benefits, and options were discussed with the patient. There are no contraindications to the procedure. The patent expressed understanding and agreed to the procedure. Informed written consent was obtained prior to the start of the procedure and can be found in the patient's chart.        PATIENT NAME: Alivia Thomas   MRN: [id number]     DATE OF PROCEDURE: 05/22/2023     PROCEDURE:  Left L3, L4, and L5 Lumbar Radiofrequency Ablation under Fluoroscopy    PRE-OP DIAGNOSIS: Spondylosis of lumbar region without myelopathy or radiculopathy [M47.816] Lumbar spondylosis [M47.816]    POST-OP DIAGNOSIS: Same    PHYSICIAN: Israel Hurtado MD    ASSISTANTS: Coy Sosa DO, Clifford Irene MD [facility]      MEDICATIONS INJECTED:  Preservative-free Decadron 10mg with 9cc of Bupivicaine 0.25%    LOCAL ANESTHETIC INJECTED:   Xylocaine 2%    SEDATION: Versed 2mg and Fentanyl 25mcg                                                                                                                                                                                     Conscious sedation ordered by M.D. Patient re-evaluation prior to administration of conscious sedation. No changes noted in patient's status from initial evaluation. The patient's vital signs were monitored by RN and patient remained hemodynamically stable throughout the procedure.    Event Time In   Sedation Start 1043   Sedation End 1108       ESTIMATED BLOOD LOSS:  None    COMPLICATIONS:  None     INTERVAL HISTORY: Patient has clinical and imaging findings suggestive of recurrent facet mediated pain. Patient has undergone a previous RFA at specified levels with at least 50% relief for at least 6 months. Successful diagnostic medial branch blocks have been completed within the past 2 years.    TECHNIQUE: Time-out was performed to identify the patient and procedure to be  performed. With the patient laying in a prone position, the surgical area was prepped and draped in the usual sterile fashion using ChloraPrep and fenestrated drape. The levels were determined under fluoroscopic guidance. Skin anesthesia was achieved by injecting Lidocaine 2% over the injection sites. A 20 gauge 10mm curved active tip needle was introduced to the anatomic local of the medial branch at each of the above levels using AP, lateral and/or contralateral oblique fluoroscopic imaging. Then sensory and motor testing was performed to confirm that the needle tips were in the correct location. After negative aspiration for blood or CSF was confirmed, 1 mL of the lidocaine 2% listed above was injected slowly at each site. This was followed by thermal lesioning at 80 degrees celsius for 90 seconds. That was followed by slowly injecting 2 mL of the medication mixture listed above at each site. The needles were removed and bleeding was nil. A sterile dressing was applied. No specimens collected. The patient tolerated the procedure well and did not have any procedure related motor deficit at the conclusion of the procedure.    The patient was monitored after the procedure in the recovery area. They were given post-procedure and discharge instructions to follow at home. The patient was discharged in a stable condition.    PAIN BEFORE THE PROCEDURE: 10/10    PAIN AFTER THE PROCEDURE: 8/10.  Israel Hurtado MD

## 2023-05-22 NOTE — H&P
HPI  Alivia Thomas presents for Procedure(s):  RADIOFREQUENCY ABLATION LEFT L3,L4,L5    Recent anticoagulation/antiplatelets reviewed and verified with nursing.  Recent antibiotics/recent infections reviewed and verified with nursing.    Past Medical History:   Diagnosis Date    Allergy     Anemia     Asthma     Bilateral shoulder bursitis     Cervical stenosis of spine     Chronic pain     DDD (degenerative disc disease), cervical     Depression 1/28/2019    Diabetes mellitus type II     DJD (degenerative joint disease) of knee 6/19/2014    Facet arthritis of lumbar region 12/17/2015    Facet syndrome 12/17/2015    GERD (gastroesophageal reflux disease)     Heartburn     Hyperlipidemia     Hypertension     Morbid obesity     Neuromuscular disorder     PADDY (obstructive sleep apnea)     Proteinuria     Right carpal tunnel syndrome     Sacroiliitis 6/13/2018    Sacroiliitis     Sleep apnea      Past Surgical History:   Procedure Laterality Date    CARPAL TUNNEL RELEASE      CARPAL TUNNEL RELEASE  1980s    left    CARPAL TUNNEL RELEASE  2012    right    COLONOSCOPY N/A 6/15/2020    Procedure: COLONOSCOPY;  Surgeon: Jc Koo MD;  Location: [facility] (4TH FLR);  Service: Endoscopy;  Laterality: N/A;  COVID screening on 6/13/20 at [facility]-  pt updated on drop off location and no visitor policy-    ESOPHAGOGASTRODUODENOSCOPY N/A 3/27/2019    Procedure: EGD (ESOPHAGOGASTRODUODENOSCOPY);  Surgeon: Robbin Bar MD;  Location: [facility] (2ND FLR);  Service: Endoscopy;  Laterality: N/A;  BMI 61.3/2nd floor case/svn    INJECTION OF JOINT Bilateral 6/9/2020    Procedure: INJECTION, JOINT, BILATERAL SI;  Surgeon: Lina Wagner MD;  [facility];  Service: Pain Management;  Laterality: Bilateral;  B/L SI Joint Injection    INJECTION OF JOINT Bilateral 5/11/2021    Procedure: INJECTION, JOINT, SACROILIAC (SI) need consent;  Surgeon: Lina Wagner MD;  Location: [facility];  Service:  Pain Management;  Laterality: Bilateral;    JOINT REPLACEMENT Bilateral     with 2 revisions on rt    KNEE SURGERY  3/2010    orthroscope    KNEE SURGERY  6-19-14    left TKR    LAPAROSCOPIC SLEEVE GASTRECTOMY N/A 8/28/2019    Procedure: GASTRECTOMY, SLEEVE, LAPAROSCOPIC with intraop EGD;  Surgeon: Heriberto Clements MD;  Location: [facility] OR 2ND FLR;  Service: General;  Laterality: N/A;    PANNICULECTOMY N/A 6/14/2022    Procedure: PANNICULECTOMY;  Surgeon: Rhett Gray MD;  Location: [facility] OR 2ND FLR;  Service: Plastics;  Laterality: N/A;    RADIOFREQUENCY ABLATION Right 7/9/2019    Procedure: RADIOFREQUENCY ABLATION;  Surgeon: Lina Wagner MD;  Location: [facility] PAIN [facility]GT;  Service: Pain Management;  Laterality: Right;  RIGHT RFA L2,3,4,5  2 of 2    RADIOFREQUENCY ABLATION Left 12/19/2019    Procedure: RADIOFREQUENCY ABLATION, LEFT L2-L3-L4-L5 MEDIAL BRANCH 1 OF 2;  Surgeon: Lina Wagner MD;  Location: [facility];  Service: Pain Management;  Laterality: Left;    RADIOFREQUENCY ABLATION Right 12/31/2019    Procedure: RADIOFREQUENCY ABLATION, RIGHT L2-L3-L4-L5  2 OF 2;  Surgeon: Lina Wagner MD;  Location: [facility];  Service: Pain Management;  Laterality: Right;    RADIOFREQUENCY ABLATION Right 10/13/2020    Procedure: RADIOFREQUENCY ABLATION, Genicular Cooled 1 of 2;  Surgeon: Lina Wagner MD;  Location: [facility];  Service: Pain Management;  Laterality: Right;    RADIOFREQUENCY ABLATION Left 11/3/2020    Procedure: RADIOFREQUENCY ABLATION, GENICULAR COOLED  2 OF 2;  Surgeon: Lina Wagner MD;  Location: [facility];  Service: Pain Management;  Laterality: Left;    RADIOFREQUENCY ABLATION Left 12/15/2020    Procedure: RADIOFREQUENCY ABLATION LEFT L2,3,4,5 1 of 2;  Surgeon: Lina Wagner MD;  Location: [facility];  Service: Pain Management;  Laterality: Left;    RADIOFREQUENCY ABLATION Right 12/29/2020    Procedure: RADIOFREQUENCY ABLATION RIGHT L2,3,4,5 2  of 2;  Surgeon: Lina Wagner MD;  Location: [facility]GT;  Service: Pain Management;  Laterality: Right;    RADIOFREQUENCY ABLATION Left 6/29/2021    Procedure: RADIOFREQUENCY ABLATION GENICULAR COOLED;  Surgeon: Lina Wagner MD;  Location: [facility];  Service: Pain Management;  Laterality: Left;  1 of 2    RADIOFREQUENCY ABLATION Right 7/13/2021    Procedure: RADIOFREQUENCY ABLATION GENICULAR;  Surgeon: Lina Wagner MD;  Location: [facility];  Service: Pain Management;  Laterality: Right;  2 of 2    RADIOFREQUENCY ABLATION Right 8/24/2021    Procedure: RADIOFREQUENCY ABLATION, L2-L3-L4-L5 MEDIAL BRANCH 1 OF 2;  Surgeon: Lina Wagner MD;  Location: [facility];  Service: Pain Management;  Laterality: Right;    RADIOFREQUENCY ABLATION Left 9/11/2021    Procedure: RADIOFREQUENCY ABLATION, L2-L3-L4-L5 MEDIAL BR ANCH 2 OF 2;  Surgeon: Lina Wagner MD;  Location: [facility];  Service: Pain Management;  Laterality: Left;    RADIOFREQUENCY ABLATION Right 3/7/2022    Procedure: RADIOFREQUENCY ABLATION RIGHT L3,4,5 1 of 2, needs consent;  Surgeon: Israel Hurtado MD;  Location: [facility];  Service: Pain Management;  Laterality: Right;    RADIOFREQUENCY ABLATION Left 3/21/2022    Procedure: RADIOFREQUENCY ABLATION RIGHT L3,4,5 2 of 2, needs consent;  Surgeon: Israel Hurtado MD;  Location: [facility];  Service: Pain Management;  Laterality: Left;    RADIOFREQUENCY ABLATION Right 5/9/2022    Procedure: RADIOFREQUENCY ABLATION RIGHT GENICULAR COOLED 1 of 2;  Surgeon: Israel Hurtado MD;  Location: [facility];  Service: Pain Management;  Laterality: Right;    RADIOFREQUENCY ABLATION Left 5/23/2022    Procedure: RADIOFREQUENCY ABLATION LEFT GENICULAR COOLED  2 of 2;  Surgeon: Israel Hurtado MD;  Location: [facility];  Service: Pain Management;  Laterality: Left;    RADIOFREQUENCY ABLATION Right 12/12/2022    Procedure: RADIOFREQUENCY ABLATION RIGHT GENICULAR COOLED  ONE OF TWO  NEEDS  CONSENT;  Surgeon: Israel Hurtado MD;  Location: [facility]IN [facility]GT;  Service: Pain Management;  Laterality: Right;    RADIOFREQUENCY ABLATION Left 1/9/2023    Procedure: RADIOFREQUENCY ABLATION LEFT GENICULAR COOLED  TWO OF TWO NEEDS CONSENT;  Surgeon: Israel Hurtado MD;  Location: [facility];  Service: Pain Management;  Laterality: Left;    RADIOFREQUENCY ABLATION Right 3/6/2023    Procedure: RADIOFREQUENCY ABLATION RIGHT L3,L4,L5;  Surgeon: Israel Hurtado MD;  Location: [facility];  Service: Pain Management;  Laterality: Right;    RADIOFREQUENCY ABLATION OF LUMBAR MEDIAL BRANCH NERVE AT SINGLE LEVEL Left 6/26/2018    Procedure: RADIOFREQUENCY ABLATION, NERVE, MEDIAL BRANCH, LUMBAR, 1 LEVEL;  Surgeon: Lina Wagner MD;  Location: [facility];  Service: Pain Management;  Laterality: Left;  Left Cooled RFA @ L2,3,4,5  [id number]  with Sedation    1 of 2    RADIOFREQUENCY ABLATION OF LUMBAR MEDIAL BRANCH NERVE AT SINGLE LEVEL Right 7/10/2018    Procedure: RADIOFREQUENCY ABLATION, NERVE, MEDIAL BRANCH, LUMBAR, 1 LEVEL;  Surgeon: Lina Wagner MD;  Location: [facility];  Service: Pain Management;  Laterality: Right;  RIght Cooled RFA @ L2,3,4,5  98848-99735 with Sedation    2 of 2    TRIGGER FINGER RELEASE Right 2017    TRIGGER FINGER RELEASE Left 7/29/2019    Procedure: RELEASE, TRIGGER FINGER, Left Thumb;  Surgeon: Velma Garcia MD;  Location: [facility] OR;  Service: Orthopedics;  Laterality: Left;  Local w/ MAC     Review of patient's allergies indicates:   Allergen Reactions    Strawberry Anaphylaxis        PMHx, PSHx, Allergies, Medications reviewed in epic      ROS negative except pain complaints in HPI    OBJECTIVE:    LMP 06/26/2018   Breastfeeding No     PHYSICAL EXAMINATION:    GENERAL: Well appearing, in no acute distress, alert and oriented to name, situation, location.  PSYCH:  Mood and affect appropriate.  SKIN: Skin color, texture, turgor normal, no rashes or lesions at site of  procedure.  CV: regular rate with palpation of the radial artery.  PULM: No evidence of respiratory difficulty, symmetric chest rise. No audible abnormal respiratory sounds.  NEURO: Cranial nerves grossly intact.    Plan:    Proceed with procedure as planned    Coy Sosa  05/22/2023

## 2023-05-24 ENCOUNTER — PES CALL (OUTPATIENT)
Dept: ADMINISTRATIVE | Facility: CLINIC | Age: 58
End: 2023-05-24
Payer: MEDICARE

## 2023-05-29 ENCOUNTER — OFFICE VISIT (OUTPATIENT)
Dept: OPHTHALMOLOGY | Facility: CLINIC | Age: 58
End: 2023-05-29
Payer: MEDICARE

## 2023-05-29 DIAGNOSIS — H25.13 NUCLEAR SCLEROSIS OF BOTH EYES: Primary | ICD-10-CM

## 2023-05-29 DIAGNOSIS — H43.813 VITREOUS DETACHMENT OF BOTH EYES: ICD-10-CM

## 2023-05-29 DIAGNOSIS — E11.9 TYPE 2 DIABETES MELLITUS WITHOUT RETINOPATHY: ICD-10-CM

## 2023-05-29 DIAGNOSIS — H26.9 CORTICAL CATARACT OF BOTH EYES: ICD-10-CM

## 2023-05-29 DIAGNOSIS — H52.7 REFRACTIVE ERROR: ICD-10-CM

## 2023-05-29 PROCEDURE — 99999 PR PBB SHADOW E&M-EST. PATIENT-LVL III: CPT | Mod: PBBFAC,,, | Performed by: OPHTHALMOLOGY

## 2023-05-29 PROCEDURE — 92136 OPHTHALMIC BIOMETRY: CPT | Mod: PBBFAC,PO | Performed by: OPHTHALMOLOGY

## 2023-05-29 PROCEDURE — 92136 BIOMETRY: ICD-10-PCS | Mod: 26,S$PBB,LT, | Performed by: OPHTHALMOLOGY

## 2023-05-29 PROCEDURE — 92004 COMPRE OPH EXAM NEW PT 1/>: CPT | Mod: S$PBB,,, | Performed by: OPHTHALMOLOGY

## 2023-05-29 PROCEDURE — 99999 PR PBB SHADOW E&M-EST. PATIENT-LVL III: ICD-10-PCS | Mod: PBBFAC,,, | Performed by: OPHTHALMOLOGY

## 2023-05-29 PROCEDURE — 99213 OFFICE O/P EST LOW 20 MIN: CPT | Mod: PBBFAC,PO | Performed by: OPHTHALMOLOGY

## 2023-05-29 PROCEDURE — 92004 PR EYE EXAM, NEW PATIENT,COMPREHESV: ICD-10-PCS | Mod: S$PBB,,, | Performed by: OPHTHALMOLOGY

## 2023-05-29 NOTE — PROGRESS NOTES
Subjective:       Patient ID: Alivia Thomas is a 57 y.o. female.    Chief Complaint: Concerns About Ocular Health    HPI    Referred:     58 y/o female present to clinic for Cataract Evaluation. H/o of Diabetes.   Pt present to clinic for bilateral blurry vision . She is glare   sensitivity. Occasional floaters OS>OD. No flashes of light reported.      Eyemeds  No gtts  Last edited by Anitra Penny on 5/29/2023  1:51 PM.             Assessment:       1. Nuclear sclerosis of both eyes    2. Cortical cataract of both eyes    3. Vitreous detachment of both eyes    4. Type 2 diabetes mellitus without retinopathy - Both Eyes    5. Refractive error        Plan:       Visually significant cataract OU -Pt. Wants Sx.     PVD's OU-Stable.  DM-No NPDR OU.  RE      Cataract Surgery Consent: Patient with a visually significant cataract with difficulties of ADLs, reading, driving, night vision, glare (any and all).  Discussed with Patient/Family/Caregiver: options, risks and benefits, expectations of cataract surgery, utilized an eye model with questions and answers to facilitate discussion.  Discussed lens options and patient understands that glasses may be required for optimal vision for distance and/or near vision after cataract surgery.  The Patient/Family/Caregiver  voice good understanding and patient wishes to proceed with surgery.  The patient will likely benefit from surgery and patient signed consent for Left Eye.   Will call Pt to schedule CE OS 1st CNAOTO 11.0,                                             OD 2nd CNAOTO 10.5.  Control DM.

## 2023-05-31 ENCOUNTER — TELEPHONE (OUTPATIENT)
Dept: OPHTHALMOLOGY | Facility: CLINIC | Age: 58
End: 2023-05-31
Payer: MEDICARE

## 2023-06-07 ENCOUNTER — PATIENT MESSAGE (OUTPATIENT)
Dept: BARIATRICS | Facility: CLINIC | Age: 58
End: 2023-06-07
Payer: MEDICARE

## 2023-06-09 ENCOUNTER — TELEPHONE (OUTPATIENT)
Dept: PAIN MEDICINE | Facility: CLINIC | Age: 58
End: 2023-06-09
Payer: MEDICARE

## 2023-06-09 NOTE — TELEPHONE ENCOUNTER
----- Message from Vijaya Mccann sent at 6/9/2023 11:42 AM CDT -----  Can the clinic reply in [facility]: NO              Please refill the medication(s) listed below. Please call the patient when the prescription(s) is ready for  at this phone number   [phone]           Medication #1oxyCODONE-acetaminophen (PERCOCET)  mg per tablet         Medication #2           [facility] [place] [facility] [address]

## 2023-06-12 ENCOUNTER — TELEPHONE (OUTPATIENT)
Dept: OPHTHALMOLOGY | Facility: CLINIC | Age: 58
End: 2023-06-12
Payer: MEDICARE

## 2023-06-12 ENCOUNTER — OFFICE VISIT (OUTPATIENT)
Dept: INTERNAL MEDICINE | Facility: CLINIC | Age: 58
End: 2023-06-12
Payer: MEDICARE

## 2023-06-12 ENCOUNTER — PATIENT MESSAGE (OUTPATIENT)
Dept: OPHTHALMOLOGY | Facility: CLINIC | Age: 58
End: 2023-06-12
Payer: MEDICARE

## 2023-06-12 VITALS
BODY MASS INDEX: 39.67 KG/M2 | WEIGHT: 238.13 LBS | HEIGHT: 65 IN | OXYGEN SATURATION: 98 % | SYSTOLIC BLOOD PRESSURE: 122 MMHG | DIASTOLIC BLOOD PRESSURE: 70 MMHG | HEART RATE: 66 BPM

## 2023-06-12 DIAGNOSIS — M47.816 LUMBAR SPONDYLOSIS: ICD-10-CM

## 2023-06-12 DIAGNOSIS — G47.33 OSA (OBSTRUCTIVE SLEEP APNEA): Chronic | ICD-10-CM

## 2023-06-12 DIAGNOSIS — Z00.00 ENCOUNTER FOR PREVENTIVE HEALTH EXAMINATION: Primary | ICD-10-CM

## 2023-06-12 DIAGNOSIS — E11.9 TYPE 2 DIABETES MELLITUS WITHOUT COMPLICATION, WITHOUT LONG-TERM CURRENT USE OF INSULIN: Chronic | ICD-10-CM

## 2023-06-12 DIAGNOSIS — M46.1 SACROILIITIS: ICD-10-CM

## 2023-06-12 DIAGNOSIS — E78.2 MIXED HYPERLIPIDEMIA: Chronic | ICD-10-CM

## 2023-06-12 DIAGNOSIS — F33.41 RECURRENT MAJOR DEPRESSIVE DISORDER, IN PARTIAL REMISSION: ICD-10-CM

## 2023-06-12 DIAGNOSIS — G89.4 CHRONIC PAIN SYNDROME: Primary | ICD-10-CM

## 2023-06-12 DIAGNOSIS — I70.0 AORTIC ATHEROSCLEROSIS: ICD-10-CM

## 2023-06-12 DIAGNOSIS — E66.01 MORBID OBESITY: ICD-10-CM

## 2023-06-12 DIAGNOSIS — H25.12 NS (NUCLEAR SCLEROSIS), LEFT: Primary | ICD-10-CM

## 2023-06-12 DIAGNOSIS — E11.9 TYPE 2 DIABETES MELLITUS WITHOUT RETINOPATHY: ICD-10-CM

## 2023-06-12 DIAGNOSIS — K21.9 GASTROESOPHAGEAL REFLUX DISEASE, UNSPECIFIED WHETHER ESOPHAGITIS PRESENT: Chronic | ICD-10-CM

## 2023-06-12 PROCEDURE — 90677 PCV20 VACCINE IM: CPT | Mod: PBBFAC

## 2023-06-12 PROCEDURE — 99215 OFFICE O/P EST HI 40 MIN: CPT | Mod: PBBFAC

## 2023-06-12 PROCEDURE — G0439 PR MEDICARE ANNUAL WELLNESS SUBSEQUENT VISIT: ICD-10-PCS | Mod: ,,,

## 2023-06-12 PROCEDURE — G0439 PPPS, SUBSEQ VISIT: HCPCS | Mod: ,,,

## 2023-06-12 PROCEDURE — 99999 PR PBB SHADOW E&M-EST. PATIENT-LVL V: ICD-10-PCS | Mod: PBBFAC,,,

## 2023-06-12 PROCEDURE — 99999 PR PBB SHADOW E&M-EST. PATIENT-LVL V: CPT | Mod: PBBFAC,,,

## 2023-06-12 RX ORDER — OXYCODONE AND ACETAMINOPHEN 10; 325 MG/1; MG/1
1 TABLET ORAL EVERY 8 HOURS PRN
Qty: 90 TABLET | Refills: 0 | Status: SHIPPED | OUTPATIENT
Start: 2023-06-12 | End: 2023-07-11 | Stop reason: SDUPTHER

## 2023-06-12 NOTE — PROGRESS NOTES
"  Alivia Thomas presented for a  Medicare AWV and comprehensive Health Risk Assessment today. She is a patient of Dr. Richardson and is new to me.  The following components were reviewed and updated:    Medical history  Family History  Social history  Allergies and Current Medications  Health Risk Assessment  Health Maintenance  Care Team     ** See Completed Assessments for Annual Wellness Visit within the encounter summary.**    The following assessments were completed:  Living Situation  CAGE  Depression Screening  Timed Get Up and Go  Whisper Test  Cognitive Function Screening  Nutrition Screening  ADL Screening  PAQ Screening  Review for opioid screen: pt does have rx for opioid  Review for substance use disorder:  pt does not abuse substance.  Pt takes Percocet for back and knee pain.  Pt has been on this medication for a few years.  Pt has tried other treatments, but they have not been successful.          Vitals:    06/12/23 0831   BP: 122/70   BP Location: Right arm   Patient Position: Sitting   BP Method: Medium (Manual)   Pulse: 66   SpO2: 98%   Weight: 108 kg (238 lb 1.6 oz)   Height: 5' 5" (1.651 m)     Body mass index is 39.62 kg/m².    Physical Exam  Vitals and nursing note reviewed.   Constitutional:       Appearance: Normal appearance.   HENT:      Head: Normocephalic and atraumatic.   Cardiovascular:      Rate and Rhythm: Normal rate and regular rhythm.      Pulses: Normal pulses.           Radial pulses are 2+ on the right side and 2+ on the left side.        Dorsalis pedis pulses are 2+ on the right side and 2+ on the left side.        Posterior tibial pulses are 2+ on the right side and 2+ on the left side.      Heart sounds: Normal heart sounds.   Pulmonary:      Effort: Pulmonary effort is normal.      Breath sounds: Normal breath sounds.   Musculoskeletal:      Right lower leg: No edema.      Left lower leg: No edema.   Skin:     General: Skin is warm and dry.      Capillary Refill: Capillary " refill takes less than 2 seconds.   Neurological:      General: No focal deficit present.      Mental Status: She is alert and oriented to person, place, and time.   Psychiatric:         Mood and Affect: Mood normal.         Behavior: Behavior normal.        Diagnoses and health risks identified today and associated recommendations/orders:    1. Encounter for preventive health examination  - Assessment and evaluation performed as stated above    2. Sacroiliitis  - stable on current regimen.  Followed by pain management.    3. Recurrent major depressive disorder, in partial remission  - stable on fluoxetine.  Followed by pcp    4. Aortic atherosclerosis  - stable on atorvastatin.  Followed by pcp    5. Type 2 diabetes mellitus without complication, without long-term current use of insulin  6. Type 2 diabetes mellitus without retinopathy - Both Eyes  - stable on diet/exercise.  Hgb A1C 6.3 on 3/13/23.  Followed by pcp    7. Morbid obesity  - stable.  Pt encouraged to increase activity and reduce caloric intake.  Followed by pcp    8. Mixed hyperlipidemia  - stable on atorvastatin.  Followed by pcp    9. BMI 39.0-39.9,adult  - stable.  Pt encouraged to increase activity and reduce caloric intake.  Followed by pcp    10. Gastroesophageal reflux disease, unspecified whether esophagitis present  - stable on omeprazole.  Followed by pcp.    11. PADDY (obstructive sleep apnea)  - stable on CPAP.  Followed by sleep medicine.      Provided Alivia with a 5-10 year written screening schedule and personal prevention plan. Recommendations were developed using the USPSTF age appropriate recommendations. Education, counseling, and referrals were provided as needed. After Visit Summary printed and given to patient which includes a list of additional screenings\tests needed.    Follow up in about 1 year (around 6/12/2024).      Kristyn Sanchez NP    I offered to discuss advanced care planning, including how to pick a person who would  make decisions for you if you were unable to make them for yourself, called a health care power of , and what kind of decisions you might make such as use of life sustaining treatments such as ventilators and tube feeding when faced with a life limiting illness recorded on a living will that they will need to know. (How you want to be cared for as you near the end of your natural life)     X  Patient has advanced directives on file, which we reviewed, and they do not wish to make changes.

## 2023-06-12 NOTE — TELEPHONE ENCOUNTER
Patient requesting refill on oxyCODONE-acetaminophen (PERCOCET)  mg   Last office visit 04/10/23    shows last refill on 05/12/23   Patient does not have a pain contract on file with [facility] department   Patient last UDS 11/22/22 was consistent with current therapy       CODEINE Not Detected Not Detected Not Detected Not Detected Not Detected   MORPHINE Not Detected Not Detected Not Detected Not Detected Not Detected   6-ACETYLMORPHINE Not Detected Not Detected Not Detected Not Detected Not Detected   OXYCODONE Present Present Not Detected Not Detected Present   NOROYXCODONE Present Present Present Present Present   OXYMORPHONE Present Present Not Detected Not Detected Not Detected   NOROXYMORPHONE Not Detected Not Detected Not Detected Not Detected Not Detected   HYDROCODONE Not Detected Not Detected Not Detected Not Detected Not Detected   NORHYDROCODONE Not Detected Not Detected Not Detected Not Detected Not Detected   HYDROMORPHONE Not Detected Not Detected Not Detected Not Detected Not Detected   BUPRENORPHINE Not Detected Not Detected Not Detected Not Detected Not Detected   NORUBPRENORPHINE Not Detected Not Detected Not Detected Not Detected Not Detected   FENTANYL Not Detected Not Detected Not Detected Not Detected Not Detected   NORFENTANYL Not Detected Not Detected Not Detected Not Detected Not Detected   MEPERIDINE METABOLITE Not Detected Not Detected Not Detected Not Detected Not Detected   TAPENTADOL Not Detected Not

## 2023-06-12 NOTE — PATIENT INSTRUCTIONS
Counseling and Referral of Other Preventative  (Italic type indicates deductible and co-insurance are waived)    Patient Name: Alivia Thomas  Today's Date: 6/12/2023    Health Maintenance       Date Due Completion Date    Sign Pain Contract Never done ---    Pneumococcal Vaccines (Age 0-64) (2 - PCV) 01/07/2011 1/7/2010    COVID-19 Vaccine (4 - Pfizer series) 06/12/2024 (Originally 1/3/2022) 11/8/2021    Hemoglobin A1c 09/13/2023 3/13/2023    Mammogram 11/14/2023 11/14/2022    Foot Exam 01/09/2024 1/9/2023    Override on 8/13/2020: Done    Override on 5/20/2019: Done ([facility])    Diabetes Urine Screening 03/13/2024 3/13/2023    Lipid Panel 03/13/2024 3/13/2023    High Dose Statin 05/29/2024 5/29/2023    Eye Exam 05/29/2024 5/29/2023    TETANUS VACCINE 04/01/2025 4/1/2015    Colorectal Cancer Screening 06/15/2025 6/15/2020    Cervical Cancer Screening 05/03/2026 5/3/2021        No orders of the defined types were placed in this encounter.    The following information is provided to all patients.  This information is to help you find resources for any of the problems found today that may be affecting your health:                Living healthy guide: www.[facility].[place].gov      Understanding Diabetes: www.diabetes.org      Eating healthy: www.cdc.gov/healthyweight      CDC home safety checklist: www.cdc.gov/steadi/patient.html      Agency on Aging: www.goea.[place].[facility]      Alcoholics anonymous (AA): www.aa.org      Physical Activity: www.joanne.nih.gov/rx1lhnt      Tobacco use: www.quitwithusla.org

## 2023-06-12 NOTE — TELEPHONE ENCOUNTER
----- Message from [place] MA sent at 6/9/2023 11:56 AM CDT -----  Regarding: route to [facility] on monday  Patient requesting refill on oxyCODONE-acetaminophen (PERCOCET)  mg   Last office visit 04/10/23   shows last refill on 05/12/23  Patient does not have a pain contract on file with [facility] department  Patient last UDS 11/22/22 was consistent with current therapy     CODEINE  Not Detected  Not Detected   Not Detected   Not Detected  Not Detected   MORPHINE  Not Detected  Not Detected   Not Detected   Not Detected  Not Detected   6-ACETYLMORPHINE  Not Detected  Not Detected   Not Detected   Not Detected  Not Detected   OXYCODONE  Present  Present   Not Detected   Not Detected  Present   NOROYXCODONE  Present  Present   Present   Present  Present   OXYMORPHONE  Present  Present   Not Detected   Not Detected  Not Detected   NOROXYMORPHONE  Not Detected  Not Detected   Not Detected   Not Detected  Not Detected   HYDROCODONE  Not Detected  Not Detected   Not Detected   Not Detected  Not Detected   NORHYDROCODONE  Not Detected  Not Detected   Not Detected   Not Detected  Not Detected   HYDROMORPHONE  Not Detected  Not Detected   Not Detected   Not Detected  Not Detected   BUPRENORPHINE  Not Detected  Not Detected   Not Detected   Not Detected  Not Detected   NORUBPRENORPHINE  Not Detected  Not Detected   Not Detected   Not Detected  Not Detected   FENTANYL  Not Detected  Not Detected   Not Detected   Not Detected  Not Detected   NORFENTANYL  Not Detected  Not Detected   Not Detected   Not Detected  Not Detected   MEPERIDINE METABOLITE  Not Detected  Not Detected   Not Detected   Not Detected  Not Detected   TAPENTADOL  Not Detected  Not

## 2023-06-13 ENCOUNTER — PATIENT MESSAGE (OUTPATIENT)
Dept: BARIATRICS | Facility: CLINIC | Age: 58
End: 2023-06-13
Payer: MEDICARE

## 2023-06-13 ENCOUNTER — TELEPHONE (OUTPATIENT)
Dept: OPHTHALMOLOGY | Facility: CLINIC | Age: 58
End: 2023-06-13
Payer: MEDICARE

## 2023-06-13 DIAGNOSIS — H25.13 NUCLEAR SCLEROTIC CATARACT OF BOTH EYES: Primary | ICD-10-CM

## 2023-06-13 DIAGNOSIS — H25.11 NS (NUCLEAR SCLEROSIS), RIGHT: Primary | ICD-10-CM

## 2023-06-13 RX ORDER — PREDNISOLONE ACETATE 10 MG/ML
1 SUSPENSION/ DROPS OPHTHALMIC 4 TIMES DAILY
Qty: 5 ML | Refills: 1 | Status: SHIPPED | OUTPATIENT
Start: 2023-08-08 | End: 2023-09-07

## 2023-06-13 RX ORDER — KETOROLAC TROMETHAMINE 5 MG/ML
1 SOLUTION OPHTHALMIC 4 TIMES DAILY
Qty: 5 ML | Refills: 1 | Status: SHIPPED | OUTPATIENT
Start: 2023-08-05 | End: 2023-09-04

## 2023-06-13 RX ORDER — OFLOXACIN 3 MG/ML
1 SOLUTION/ DROPS OPHTHALMIC 4 TIMES DAILY
Qty: 5 ML | Refills: 1 | Status: SHIPPED | OUTPATIENT
Start: 2023-08-05 | End: 2023-08-15

## 2023-07-10 ENCOUNTER — OFFICE VISIT (OUTPATIENT)
Dept: PAIN MEDICINE | Facility: CLINIC | Age: 58
End: 2023-07-10
Payer: MEDICARE

## 2023-07-10 VITALS
TEMPERATURE: 99 F | RESPIRATION RATE: 19 BRPM | BODY MASS INDEX: 41.32 KG/M2 | SYSTOLIC BLOOD PRESSURE: 135 MMHG | WEIGHT: 248 LBS | HEIGHT: 65 IN | HEART RATE: 83 BPM | DIASTOLIC BLOOD PRESSURE: 80 MMHG

## 2023-07-10 DIAGNOSIS — M51.36 DDD (DEGENERATIVE DISC DISEASE), LUMBAR: ICD-10-CM

## 2023-07-10 DIAGNOSIS — M47.817 SPONDYLOSIS OF LUMBOSACRAL REGION WITHOUT MYELOPATHY OR RADICULOPATHY: ICD-10-CM

## 2023-07-10 DIAGNOSIS — G89.4 CHRONIC PAIN DISORDER: ICD-10-CM

## 2023-07-10 DIAGNOSIS — M54.9 DORSALGIA, UNSPECIFIED: ICD-10-CM

## 2023-07-10 DIAGNOSIS — M53.3 SACROILIAC JOINT PAIN: ICD-10-CM

## 2023-07-10 DIAGNOSIS — M54.17 LUMBOSACRAL RADICULOPATHY: ICD-10-CM

## 2023-07-10 DIAGNOSIS — M79.18 MYOFASCIAL PAIN: Primary | ICD-10-CM

## 2023-07-10 DIAGNOSIS — G89.4 CHRONIC PAIN SYNDROME: ICD-10-CM

## 2023-07-10 PROCEDURE — 99215 OFFICE O/P EST HI 40 MIN: CPT | Mod: PBBFAC,25 | Performed by: NURSE PRACTITIONER

## 2023-07-10 PROCEDURE — 99999 PR PBB SHADOW E&M-EST. PATIENT-LVL V: CPT | Mod: PBBFAC,,, | Performed by: NURSE PRACTITIONER

## 2023-07-10 PROCEDURE — 99214 PR OFFICE/OUTPT VISIT, EST, LEVL IV, 30-39 MIN: ICD-10-PCS | Mod: 25,S$PBB,, | Performed by: NURSE PRACTITIONER

## 2023-07-10 PROCEDURE — 99214 OFFICE O/P EST MOD 30 MIN: CPT | Mod: 25,S$PBB,, | Performed by: NURSE PRACTITIONER

## 2023-07-10 PROCEDURE — 20553 NJX 1/MLT TRIGGER POINTS 3/>: CPT | Mod: S$PBB,,, | Performed by: NURSE PRACTITIONER

## 2023-07-10 PROCEDURE — 20553 NJX 1/MLT TRIGGER POINTS 3/>: CPT | Mod: PBBFAC | Performed by: NURSE PRACTITIONER

## 2023-07-10 PROCEDURE — 99999 PR PBB SHADOW E&M-EST. PATIENT-LVL V: ICD-10-PCS | Mod: PBBFAC,,, | Performed by: NURSE PRACTITIONER

## 2023-07-10 PROCEDURE — 20553 PR INJECT TRIGGER POINTS, > 3: ICD-10-PCS | Mod: S$PBB,,, | Performed by: NURSE PRACTITIONER

## 2023-07-10 RX ORDER — BUPIVACAINE HYDROCHLORIDE 5 MG/ML
9 INJECTION, SOLUTION EPIDURAL; INTRACAUDAL
Status: COMPLETED | OUTPATIENT
Start: 2023-07-10 | End: 2023-07-10

## 2023-07-10 RX ORDER — TRIAMCINOLONE ACETONIDE 40 MG/ML
40 INJECTION, SUSPENSION INTRA-ARTICULAR; INTRAMUSCULAR ONCE
Status: COMPLETED | OUTPATIENT
Start: 2023-07-10 | End: 2023-07-10

## 2023-07-10 RX ADMIN — BUPIVACAINE HYDROCHLORIDE 45 MG: 5 INJECTION, SOLUTION EPIDURAL; INTRACAUDAL at 04:07

## 2023-07-10 RX ADMIN — TRIAMCINOLONE ACETONIDE 40 MG: 40 INJECTION, SUSPENSION INTRA-ARTICULAR; INTRAMUSCULAR at 04:07

## 2023-07-10 NOTE — H&P (VIEW-ONLY)
Chronic patient Established Note (Follow up visit)       SUBJECTIVE:    Interval History 7/10/2023:  The patient is here today for evaluation of increased lower back pain. She has a long-standing history of back pain which is mainly axial. She has had worsened symptoms over the past couple of weeks, most severe over the past 3 days. The pain now radiates down the back of both legs with burning and numbness. She finds it difficult to stand or walk for any length of time. No bowel or bladder incontinence. No fever or malaise. Medications are helping somewhat. She continues with home PT exercises as previously taught in PT. Her pain today is 10/10.    Interval History 4/10/2023:  The patient returns to clinic today for follow up of low back and knee pain via virtual visit. She is s/p right L3,4,5 RFA on 3/6/2023. Her left side procedure was rescheduled due to illness. This is scheduled for May 1st. She reports some relief of her right sided low back pain. She continues to report left sided low back pain. She denies any radicular leg pain. She does endorse morning stiffness. She continues to perform a home exercise routine. She is taking Percocet as needed for severe pain. She denies any other health changes.     Interval History 1/30/2023:  The patient returns to clinic today for follow up of low back and knee pain. She is s/p right genicular RFA on 12/12/2022 and left genicular RFA on 1/9/2023. She reports 60% relief of her knee pain. She reports intermittent bilateral knee pain, this is tolerable at this time. She reports increased low back pain. Her pain is constant and aching in nature. Her leg pain is much improved. Her pain is worse with moving from sitting to standing. She does endorse morning stiffness. She continues to participate in physical therapy and a home exercise routine. She continues to take Percocet as needed with benefit and without adverse effects. She denies any other health changes.     Interval  History 11/22/2022:  The patient returns to clinic today for follow up of low back and knee pain. She continues to report low back pain that radiates into the posterior aspect of both legs to under her feet. Her pain is worse with moving from sitting to standing. She also endorses morning stiffness. She recently started physical therapy. She has completed one session. She continues to report bilateral knee pain. She endorses worsening pain with the cold weather. Her pain is worse with standing and walking. She continues to take Percocet as needed with benefit and without adverse effects. She denies any other health changes. Her pain today is 8/10.    Interval History 10/5/2022:  She returns for follow-up.  Her knees are doing well.  She has some discomfort but not enough to do procedures.  Her main complaint is lumbar spine pain for the past few weeks that is radiating to the dorsal aspect of both lower extremities.  She has no red flag signs/symptoms.  No new bowel or bladder symptomatology.  The pain radiates from the back down to the plantar aspect of both feet.  It is activity related.  Rest helps some but it does not resolve the pain.  She has not been in therapy for a while.  No recent imaging.  No recent weight changes.  Otherwise, no new symptomatology.  She goes regularly to her primary care physician for health maintenance.    Interval History 8/9/2022:  Alivia Thomas presents to the clinic for a follow-up appointment for low back and knee pain. She is s/p right genicular RFA on 5/9/2022 and left genicular RFA on 5/22/2022. She reports 60% relief of her knee pain. She also had panniculectomy on 6/14/2022. She is healing well. She continues to report intermittent low back pain, worse with prolonged activity. She denies any radicular leg pain. Her pain is worse prolonged standing and walking. She continues to perform a homoe exercise routine. She continues to take Percocet as needed with benefit and  without adverse effects. She denies any other health changes. Her pain today is 7/10.    Interval History 4/9/2022:  The patient returns to clinic today for follow-up bilateral L3, 4, 5 RFA last month.  She reports that she is feeling very well following the procedure and reports 60-70% pain relief.  Today, she reports that her bilateral knee pain has continued to worsen, and she would like to go ahead and repeat bilateral genicular RFA as soon as possible.  She denies any new numbness, tingling, weakness, saddle anesthesia or bowel/bladder incontinence.    Interval History 12/28/2021:  The patient returns to clinic today for follow up via virtual visit. She continues to report bilateral knee pain. This pain is worse with prolonged standing and walking. She continues to report low back and buttock pain. She denies any radicular leg pain. She continues to report a home exercise routine. She continues to report benefit with Percocet. She denies any adverse effects. She denies any other health changes.    Pain Disability Index Review:  Last 3 PDI Scores 7/10/2023 11/22/2022 10/5/2022   Pain Disability Index (PDI) 50 40 48       Pain Medications:  Percocet 10/325 mg TID PRN pain    Opioid Contract: yes     report:  Reviewed and consistent with medication use as prescribed.    Pain Procedures:   steroid inj and visco-supplementation (series of 3) in left knee- not helpful  3/31/14 Bilateral genicular nerve blocks  2/16/16 Bilateral L2,3,4,5 MBB  3/1/16 Bilateral L2,3,4,5 MBB   4/6/16 Left L2,3,4,5 RFA- significant relief  4/20/16 Right L2,3,4,5 RFA- significant relief  10/5/16 Left L2,3,4,5 cooled RFA  10/19/16 Right L2,3,4,5 cooled RFA  4/26/17 Left L2,3,4,5 cooled RFA  5/9/17 Right L2,3,4,5 cooled RFA  12/26/2017- Left L2,3,4,5 cooled RFA  1/9/2018- Right L2,3,4,5 cooled RFA  6/26/18 Left L2,3,4,5 cooled RFA- 60% relief  7/10/18 Right L2,3,4,5 cooled RFA- 60% relief  9/28/18 TPIs- significant relief  12/27/18 Left  L2,3,4,5 RFA- 70% relief  1/29/19 Right L2,3,4,5 RFA- 70% relief  6/18/19 Left L2,3,4,5 RFA- 70% relief  7/9/19 Right L2,3,4,5 RFA- 50% relief  12/19/19 Left L2,3,4,5 RFA- 80% relief  12/31/19 Right L2,3,4,5 RFA- 50% relief  3/2/2020- Right genicular nerve block- 70% relief for one month  3/12/20 Left subacromial bursa injection- 90% relief  5/18/2020- Left genicular nerve block- 70%  6/9/2020- Bilateral SI joint injections- 50% relief  10/13/2020- Right genicular RFA- 50% relief   11/3/2020- Left genicular RFA- 50% relief  12/15/2020- Left L2,3,4,5 RFA  12/29/2020- Right L2,3,4,5 RFA  4/26/2021- Left subacromial bursa'  5/11/2021- Bilateral SI joint injections   6/28/2021- Left genicular RFA  7/13/2021- Right genicular RFA  8/24/2021- Right L2,3,4,5 RFA  9/11/2021- Left L2,3,4,5 RFA  3/7/2022- Right L2,3,4,5 RFA  3/21/2022- Left L2,3,4,5 RFA  5/9/2022- Right genicular RFA  5/23/2022- Left genicular RFA  12/12/2022- Right genicular RFA  1/9/2023- Left genicular RFA  3/6/2023- Right L3,4,5 RFA    Physical Therapy/Home Exercise: yes    Imaging:   Xray Knee 3/6/2019:  FINDINGS:  Postop bilateral knee replacements.  The the the the irregularity about the left patella with soft tissue calcifications similar.  Small joint effusion.  Some irregularity of the right patella unchanged as well.     IMPRESSION:      Postop bilateral knee replacement with irregularity about the patella as above.    MRI Cervical Spine 4/24/2018:  FINDINGS:  There is reversal of the normal cervical lordosis which could be related to patient positioning versus muscle spasm.     Cervical vertebral body heights are well maintained without evidence of acute fracture or dislocation.  Marrow signal is unremarkable.     Spinal canal contents are unremarkable.  No abnormal masses or collections.     Individual levels as detailed below:     C2-3: No significant spinal canal stenosis or neuroforaminal narrowing.     C3-4: Facet arthropathy.  No significant  spinal canal stenosis or neuroforaminal narrowing.     C4-5: Facet arthropathy.  Small broad-based disc osteophyte complex.  Mild right neuroforaminal narrowing.  Mild spinal canal stenosis.     C5-6: Facet arthropathy.  Uncovertebral joint spurring.  Broad-based posterior disc osteophyte complex.  Mild right neuroforaminal narrowing.  Mild spinal canal stenosis.     C6-7: Facet arthropathy.  No significant spinal canal stenosis or neuroforaminal narrowing.     C7-T1: No significant spinal canal stenosis or neuroforaminal narrowing.     Paraspinal muscles are unremarkable.  Soft tissues are intact.     IMPRESSION:      Mild multilevel cervical spondylosis with mild spinal canal stenosis and mild right neuroforaminal narrowing at C4-5 and C5-6.    Xray Lumbar Spine 9/21/2015:  Results: AP, lateral neutral, lateral flexion , lateral extension, bilateral oblique and spot views.  The alignment of the lumbar spine demonstrates a mild levoscoliosis .  11-mm anterior listhesis of L4 relative to L5 with no translational abnormalities   seen on flexion and extension views.  The vertebral body heights  are well-maintained  , mild disk space narrowing L4-L5 and L5-S1.   Mild anterior and marginal osteophyte formation seen  throughout the lumbar spine  .  The oblique views demonstrate no   definite spondylolysis.  There is facet joint osseous hypertrophy noted at L3-L4 and L4-L5.  IMPRESSION:         The Significant spondylosis of the lumbar spine with grade 1 anterior listhesis L4 relative to L5.  Facet joint osseous hypertrophy L3-L4 and L4-5.    Allergies:   Review of patient's allergies indicates:   Allergen Reactions    Strawberry Anaphylaxis       Current Medications:   Current Outpatient Medications   Medication Sig Dispense Refill    albuterol (VENTOLIN HFA) 90 mcg/actuation inhaler INHALE TWO PUFFS INTO LUNGS EVERY 4 TO 6 HOURS AS NEEDED FOR SHORTNESS OF BREATH AND FOR WHEEZING 18 g 1    allopurinoL (ZYLOPRIM) 300 MG  tablet Take 1 tablet (300 mg total) by mouth 2 (two) times daily. 180 tablet 3    atorvastatin (LIPITOR) 20 MG tablet Take 1 tablet (20 mg total) by mouth once daily. 90 tablet 3    b complex vitamins tablet Take 1 tablet by mouth every other day.       calcium citrate/vitamin D2 (ISIAH-CITRATE ORAL) Take 500 mg by mouth 2 (two) times daily.      colchicine (MITIGARE) 0.6 mg Cap Take 1 capsule (0.6 mg total) by mouth daily as needed (flare up). 90 capsule 3    cyanocobalamin (VITAMIN B-12) 1000 MCG tablet Take 100 mcg by mouth every morning.      diclofenac sodium (VOLTAREN) 1 % Gel Apply 2 g topically 4 (four) times daily as needed. To painful area on the feet. 500 g 5    fluconazole (DIFLUCAN) 150 MG Tab Take 1 tablet (150 mg total) by mouth Every 3 (three) days. 3 tablet 0    FLUoxetine (PROZAC) 20 mg/5 mL (4 mg/mL) solution TAKE 5 MLS BY MOUTH ONCE DAILY. 450 mL 3    fluticasone propionate (FLONASE) 50 mcg/actuation nasal spray 1 SPRAY BY EACH NOSTRIL ROUTE 2 TIMES DAILY AS NEEDED FOR RHINITIS. 48 g 3    indomethacin (INDOCIN) 50 MG capsule TAKE 1 CAPSULE BY MOUTH 2 TIMES DAILY WITH MEALS (Patient taking differently: Take 50 mg by mouth as needed.) 30 capsule 2    [START ON 8/5/2023] ketorolac 0.5% (ACULAR) 0.5 % Drop Place 1 drop into the left eye 4 (four) times daily. 5 mL 1    LIDOcaine (XYLOCAINE) 5 % Oint ointment Apply topically as needed. 35.44 g 6    MULTIVIT-IRON-MIN-FOLIC ACID 3,500-18-0.4 UNIT-MG-MG ORAL CHEW Take 1 tablet by mouth 2 (two) times daily.       nystatin (MYCOSTATIN) powder Apply topically 2 (two) times daily. 60 g 3    [START ON 8/5/2023] ofloxacin (OCUFLOX) 0.3 % ophthalmic solution Place 1 drop into the left eye 4 (four) times daily. for 10 days 5 mL 1    omeprazole (PRILOSEC) 20 MG capsule Take 1 capsule (20 mg total) by mouth every morning. 90 capsule 3    oxybutynin (DITROPAN-XL) 5 MG TR24 TAKE 1 TABLET BY MOUTH ONCE DAILY. 90 tablet 3    oxyCODONE-acetaminophen (PERCOCET)  mg  per tablet Take 1 tablet by mouth every 8 (eight) hours as needed for Pain. 90 tablet 0    [START ON 8/8/2023] prednisoLONE acetate (PRED FORTE) 1 % DrpS Place 1 drop into the left eye 4 (four) times daily. 5 mL 1    urea (CARMOL) 40 % Crea Apply topically once daily. To dry skin on the feet. 120 g 5    vitamin D 185 MG Tab Take 5,000 mg by mouth every morning.        No current facility-administered medications for this visit.     Facility-Administered Medications Ordered in Other Visits   Medication Dose Route Frequency Provider Last Rate Last Admin    0.9%  NaCl infusion   Intravenous Continuous Lina Wagner MD 25 mL/hr at 12/15/20 1506 25 mL/hr at 12/15/20 1506       REVIEW OF SYSTEMS:    GENERAL:  No weight loss, malaise or fevers.  HEENT:  Negative for frequent or significant headaches.  NECK:  Negative for lumps, goiter, pain and significant neck swelling.  RESPIRATORY:  Negative for cough, wheezing or shortness of breath.  CARDIOVASCULAR:  Negative for chest pain, leg swelling or palpitations. HTN  GI:  Negative for abdominal discomfort, blood in stools or black stools or change in bowel habits. GERD  MUSCULOSKELETAL:  See HPI.  SKIN:  Negative for lesions, rash, and itching.  PSYCH:  Negative for sleep disturbance, mood disorder and recent psychosocial stressors.  HEMATOLOGY/LYMPHOLOGY:  Negative for prolonged bleeding, bruising easily or swollen nodes.  NEURO:   No history of headaches, syncope, paralysis, seizures or tremors.  ENDO: Diabetes  All other reviewed and negative other than HPI.    Past Medical History:  Past Medical History:   Diagnosis Date    Allergy     Anemia     Asthma     Bilateral shoulder bursitis     Cervical stenosis of spine     Chronic pain     DDD (degenerative disc disease), cervical     Depression 1/28/2019    Diabetes mellitus type II     DJD (degenerative joint disease) of knee 6/19/2014    Facet arthritis of lumbar region 12/17/2015    Facet syndrome 12/17/2015    GERD  (gastroesophageal reflux disease)     Heartburn     Hyperlipidemia     Hypertension     Morbid obesity     Neuromuscular disorder     PADDY (obstructive sleep apnea)     Proteinuria     Right carpal tunnel syndrome     Sacroiliitis 6/13/2018    Sacroiliitis     Sleep apnea        Past Surgical History:  Past Surgical History:   Procedure Laterality Date    CARPAL TUNNEL RELEASE      CARPAL TUNNEL RELEASE  1980s    left    CARPAL TUNNEL RELEASE  2012    right    COLONOSCOPY N/A 6/15/2020    Procedure: COLONOSCOPY;  Surgeon: Jc Koo MD;  Location: [facility] (4TH FLR);  Service: Endoscopy;  Laterality: N/A;  COVID screening on 6/13/20 at [facility]  pt updated on drop off location and no visitor policy-    ESOPHAGOGASTRODUODENOSCOPY N/A 3/27/2019    Procedure: EGD (ESOPHAGOGASTRODUODENOSCOPY);  Surgeon: Robbin Bar MD;  Location: [facility] (2ND FLR);  Service: Endoscopy;  Laterality: N/A;  BMI 61.3/2nd floor case/svn    INJECTION OF JOINT Bilateral 6/9/2020    Procedure: INJECTION, JOINT, BILATERAL SI;  Surgeon: Lina Wagner MD;  Location: [facility] PAIN MGT;  Service: Pain Management;  Laterality: Bilateral;  B/L SI Joint Injection    INJECTION OF JOINT Bilateral 5/11/2021    Procedure: INJECTION, JOINT, SACROILIAC (SI) need consent;  Surgeon: Lina Wagner MD;  Location: [facility] PAIN MGT;  Service: Pain Management;  Laterality: Bilateral;    JOINT REPLACEMENT Bilateral     with 2 revisions on rt    KNEE SURGERY  3/2010    orthroscope    KNEE SURGERY  6-19-14    left TKR    LAPAROSCOPIC SLEEVE GASTRECTOMY N/A 8/28/2019    Procedure: GASTRECTOMY, SLEEVE, LAPAROSCOPIC with intraop EGD;  Surgeon: Heriberto Clements MD;  Location: [facility] OR 2ND FLR;  Service: General;  Laterality: N/A;    PANNICULECTOMY N/A 6/14/2022    Procedure: PANNICULECTOMY;  Surgeon: Rhett Gray MD;  Location: [facility] OR [address] [place] [address];  Service: Plastics;  Laterality: N/A;    RADIOFREQUENCY ABLATION Right 7/9/2019     Procedure: RADIOFREQUENCY ABLATION;  Surgeon: Lina Wagner MD;  Location: [facility] PAIN MGT;  Service: Pain Management;  Laterality: Right;  RIGHT RFA L2,3,4,5  2 of 2    RADIOFREQUENCY ABLATION Left 12/19/2019    Procedure: RADIOFREQUENCY ABLATION, LEFT L2-L3-L4-L5 MEDIAL BRANCH 1 OF 2;  Surgeon: Lina Wagner MD;  Location: [facility] PAIN MGT;  Service: Pain Management;  Laterality: Left;    RADIOFREQUENCY ABLATION Right 12/31/2019    Procedure: RADIOFREQUENCY ABLATION, RIGHT L2-L3-L4-L5  2 OF 2;  Surgeon: Lina Wagner MD;  Location: [facility]IN [facility]GT;  Service: Pain Management;  Laterality: Right;    RADIOFREQUENCY ABLATION Right 10/13/2020    Procedure: RADIOFREQUENCY ABLATION, Genicular Cooled 1 of 2;  Surgeon: Lina Wagner MD;  Location: [facility]IN [facility]GT;  Service: Pain Management;  Laterality: Right;    RADIOFREQUENCY ABLATION Left 11/3/2020    Procedure: RADIOFREQUENCY ABLATION, GENICULAR COOLED  2 OF 2;  Surgeon: Lina Wagner MD;  Location: [facility] PAIN MGT;  Service: Pain Management;  Laterality: Left;    RADIOFREQUENCY ABLATION Left 12/15/2020    Procedure: RADIOFREQUENCY ABLATION LEFT L2,3,4,5 1 of 2;  Surgeon: Lina Wagner MD;  Location: [facility] PAIN MGT;  Service: Pain Management;  Laterality: Left;    RADIOFREQUENCY ABLATION Right 12/29/2020    Procedure: RADIOFREQUENCY ABLATION RIGHT L2,3,4,5 2 of 2;  Surgeon: Lina Wagner MD;  Location: [facility] PAIN MGT;  Service: Pain Management;  Laterality: Right;    RADIOFREQUENCY ABLATION Left 6/29/2021    Procedure: RADIOFREQUENCY ABLATION GENICULAR COOLED;  Surgeon: Lina Wagner MD;  Location: [facility] PAIN MGT;  Service: Pain Management;  Laterality: Left;  1 of 2    RADIOFREQUENCY ABLATION Right 7/13/2021    Procedure: RADIOFREQUENCY ABLATION GENICULAR;  Surgeon: Lina Wagner MD;  Location: [facility] PAIN MGT;  Service: Pain Management;  Laterality: Right;  2 of 2    RADIOFREQUENCY ABLATION Right 8/24/2021    Procedure: RADIOFREQUENCY  ABLATION, L2-L3-L4-L5 MEDIAL BRANCH 1 OF 2;  Surgeon: Lina Wagner MD;  Location: [facility] PAIN MGT;  Service: Pain Management;  Laterality: Right;    RADIOFREQUENCY ABLATION Left 9/11/2021    Procedure: RADIOFREQUENCY ABLATION, L2-L3-L4-L5 MEDIAL BR ANCH 2 OF 2;  Surgeon: Lina Wagner MD;  Location: [facility] PAIN MGT;  Service: Pain Management;  Laterality: Left;    RADIOFREQUENCY ABLATION Right 3/7/2022    Procedure: RADIOFREQUENCY ABLATION RIGHT L3,4,5 1 of 2, needs consent;  Surgeon: Israel Hurtado MD;  Location: [facility] PAIN MGT;  Service: Pain Management;  Laterality: Right;    RADIOFREQUENCY ABLATION Left 3/21/2022    Procedure: RADIOFREQUENCY ABLATION RIGHT L3,4,5 2 of 2, needs consent;  Surgeon: Israel Hurtado MD;  Location: [facility] PAIN MGT;  Service: Pain Management;  Laterality: Left;    RADIOFREQUENCY ABLATION Right 5/9/2022    Procedure: RADIOFREQUENCY ABLATION RIGHT GENICULAR COOLED 1 of 2;  Surgeon: Israel Hurtado MD;  Location: [facility] PAIN MGT;  Service: Pain Management;  Laterality: Right;    RADIOFREQUENCY ABLATION Left 5/23/2022    Procedure: RADIOFREQUENCY ABLATION LEFT GENICULAR COOLED  2 of 2;  Surgeon: Israel Hurtado MD;  Location: [facility] PAIN MGT;  Service: Pain Management;  Laterality: Left;    RADIOFREQUENCY ABLATION Right 12/12/2022    Procedure: RADIOFREQUENCY ABLATION RIGHT GENICULAR COOLED  ONE OF TWO  NEEDS CONSENT;  Surgeon: Israel Hurtado MD;  Location: [facility];  Service: Pain Management;  Laterality: Right;    RADIOFREQUENCY ABLATION Left 1/9/2023    Procedure: RADIOFREQUENCY ABLATION LEFT GENICULAR COOLED  TWO OF TWO NEEDS CONSENT;  Surgeon: Israel Hurtado MD;  Location: [facility] PAIN MGT;  Service: Pain Management;  Laterality: Left;    RADIOFREQUENCY ABLATION Right 3/6/2023    Procedure: RADIOFREQUENCY ABLATION RIGHT L3,L4,L5;  Surgeon: Israel Hurtado MD;  Location: [facility] PAIN MGT;  Service: Pain Management;  Laterality: Right;    RADIOFREQUENCY ABLATION Left 5/22/2023    Procedure: RADIOFREQUENCY  ABLATION LEFT L3,L4,L5;  Surgeon: Israel Hurtado MD;  Location: [facility] PAIN MGT;  Service: Pain Management;  Laterality: Left;  4/3 LM TO R/S    RADIOFREQUENCY ABLATION OF LUMBAR MEDIAL BRANCH NERVE AT SINGLE LEVEL Left 6/26/2018    Procedure: RADIOFREQUENCY ABLATION, NERVE, MEDIAL BRANCH, LUMBAR, 1 LEVEL;  Surgeon: Lina Wagner MD;  Location: [facility] PAIN MGT;  Service: Pain Management;  Laterality: Left;  Left Cooled RFA @ L2,3,4,5  [id number]  with Sedation    1 of 2    RADIOFREQUENCY ABLATION OF LUMBAR MEDIAL BRANCH NERVE AT SINGLE LEVEL Right 7/10/2018    Procedure: RADIOFREQUENCY ABLATION, NERVE, MEDIAL BRANCH, LUMBAR, 1 LEVEL;  Surgeon: Lina Wagner MD;  Location: [facility] PAIN MGT;  Service: Pain Management;  Laterality: Right;  RIght Cooled RFA @ L2,3,4,5  [id number] with Sedation    2 of 2    TRIGGER FINGER RELEASE Right 2017    TRIGGER FINGER RELEASE Left 7/29/2019    Procedure: RELEASE, TRIGGER FINGER, Left Thumb;  Surgeon: Velma Garcia MD;  Location: [facility] OR;  Service: Orthopedics;  Laterality: Left;  Local w/ MAC       Family History:  Family History   Problem Relation Age of Onset    Diabetes Mother     Cataracts Mother     Diabetes Father     Cataracts Father     Hypertension Sister     Diabetes Sister     No Known Problems Sister     Cancer Sister         lymphoma     Coronary artery disease Brother     Diabetes Brother     Diabetes Brother     Hypotension Brother     Kidney failure Brother     Hypotension Brother     Amblyopia Neg Hx     Blindness Neg Hx     Glaucoma Neg Hx     Macular degeneration Neg Hx     Retinal detachment Neg Hx     Strabismus Neg Hx     Stroke Neg Hx     Thyroid disease Neg Hx     Anesthesia problems Neg Hx        Social History:  Social History     Socioeconomic History    Marital status:    Tobacco Use    Smoking status: Never    Smokeless tobacco: Never   Substance and Sexual Activity    Alcohol use: Not Currently     Comment: occasionally     Drug use:  "No    Sexual activity: Yes     Partners: Female     Birth control/protection: None   Social History Narrative    Disabled. The patient is the youngest of 6 siblings. Single. Lives with single-sex partner.                  Social Determinants of Health     Financial Resource Strain: Unknown    Difficulty of Paying Living Expenses: Patient refused   Food Insecurity: No Food Insecurity    Worried About Running Out of Food in the Last Year: Never true    Ran Out of Food in the Last Year: Never true   Transportation Needs: No Transportation Needs    Lack of Transportation (Medical): No    Lack of Transportation (Non-Medical): No   Physical Activity: Unknown    Days of Exercise per Week: Patient refused    Minutes of Exercise per Session: Patient refused   Stress: Stress Concern Present    Feeling of Stress : Very much   Social Connections: Unknown    Frequency of Communication with Friends and Family: Twice a week    Frequency of Social Gatherings with Friends and Family: Never    Attends [employer] Services: More than 4 times per year    Active Member of Clubs or Organizations: No    Attends Club or Organization Meetings: Never    Marital Status: Patient refused   Housing Stability: Unknown    Unable to Pay for Housing in the Last Year: Patient refused    Unstable Housing in the Last Year: Patient refused       OBJECTIVE:    VITALS:    /80 (BP Location: Left arm, Patient Position: Sitting)   Pulse 83   Temp 98.9 °F (37.2 °C) (Oral)   Resp 19   Ht 5' 5" (1.651 m)   Wt 112.5 kg (248 lb 0.3 oz)   LMP 06/26/2018   BMI 41.27 kg/m²     PHYSICAL EXAMINATION:    GENERAL: Well appearing, in no acute distress, alert and oriented x3.   PSYCH:  Mood and affect appropriate.  SKIN: Skin color, texture, turgor normal, no rashes or lesions.   HEAD/FACE:  Normocephalic, atraumatic.   CV: RRR with palpation of the radial artery.  PULM: No evidence of respiratory difficulty, symmetric chest rise.  BACK: Negative SLR " bilaterally. SLR is positive bilaterally at L5 distribution. There is pain with palpation over lumbar facet joints bilaterally. Limited ROM with pain on flexion > extension.  Positive facet loading bilaterally  EXTREMITIES: There is pain with palpation to bilateral SI joints.  JENNA is positive on the right.   MUSCULOSKELETAL: 4/5 strength in right ankle with plantar and dorsiflexion. 4/5 strength in left ankle with plantar and dorsiflexion. 5/5 strength with right knee flexion and extension. 5/5 strength with left knee flexion and extension. 5/5 strength in right EHL, 5/5 strength in left EHL.  NEURO:  Plantar response are downgoing. No clonus. Decreased sensation at a bilateral L5 and S1 distribution bilaterally.  GAIT: Antalgic- ambulates without assistance.      ASSESSMENT: 57 y.o. year old female with low back and knee pain, consistent with the followin. Myofascial pain  BUPivacaine (PF) 0.5% (5 mg/mL) injection 45 mg    triamcinolone acetonide injection 40 mg      2. Lumbosacral radiculopathy        3. Spondylosis of lumbosacral region without myelopathy or radiculopathy        4. Chronic pain disorder        5. DDD (degenerative disc disease), lumbar        6. Chronic pain syndrome        7. Sacroiliac joint pain        8. Dorsalgia, unspecified  MRI Lumbar Spine Without Contrast                PLAN:     - Previous imaging reviewed today.    - Patient is now having worsened back pain with new onset radicular symptoms. Will order lumbar MRI and plan for KERI (level pending until MRI)    - We can repeat bilateral genicular RFA as needed. Knee pain is tolerable.    - Will give TPIs today as per below.    - Continue Percocet 10/325 mg TID PRN.      - The patient is here today for a refill of current pain medications and they believe these provide effective pain control and improvements in quality of life.  The patient notes no serious side effects, and feels the benefits outweigh the risks.  The patient  was reminded of the pain contract that they signed previously as well as the risks and benefits of the medication including possible death.  The updated [place] [employer]  [employer]acy prescription monitoring program was reviewed, and the patient has been found to be compliant with current treatment plan.     - Previous UDS from 11/22/2022 reviewed and consistent.     - I have stressed the importance of physical activity and a home exercise plan to help with pain and improve health.    - RTC 2 weeks after injection. I will call with MRI results.    - Counseled patient regarding the importance of activity modification and constant sleeping habits.    The above plan and management options were discussed at length with patient. Patient is in agreement with the above and verbalized understanding.    Virginia Sanchez  07/10/2023

## 2023-07-10 NOTE — PROGRESS NOTES
Chronic patient Established Note (Follow up visit)       SUBJECTIVE:    Interval History 7/10/2023:  The patient is here today for evaluation of increased lower back pain. She has a long-standing history of back pain which is mainly axial. She has had worsened symptoms over the past couple of weeks, most severe over the past 3 days. The pain now radiates down the back of both legs with burning and numbness. She finds it difficult to stand or walk for any length of time. No bowel or bladder incontinence. No fever or malaise. Medications are helping somewhat. She continues with home PT exercises as previously taught in PT. Her pain today is 10/10.    Interval History 4/10/2023:  The patient returns to clinic today for follow up of low back and knee pain via virtual visit. She is s/p right L3,4,5 RFA on 3/6/2023. Her left side procedure was rescheduled due to illness. This is scheduled for May 1st. She reports some relief of her right sided low back pain. She continues to report left sided low back pain. She denies any radicular leg pain. She does endorse morning stiffness. She continues to perform a home exercise routine. She is taking Percocet as needed for severe pain. She denies any other health changes.     Interval History 1/30/2023:  The patient returns to clinic today for follow up of low back and knee pain. She is s/p right genicular RFA on 12/12/2022 and left genicular RFA on 1/9/2023. She reports 60% relief of her knee pain. She reports intermittent bilateral knee pain, this is tolerable at this time. She reports increased low back pain. Her pain is constant and aching in nature. Her leg pain is much improved. Her pain is worse with moving from sitting to standing. She does endorse morning stiffness. She continues to participate in physical therapy and a home exercise routine. She continues to take Percocet as needed with benefit and without adverse effects. She denies any other health changes.     Interval  History 11/22/2022:  The patient returns to clinic today for follow up of low back and knee pain. She continues to report low back pain that radiates into the posterior aspect of both legs to under her feet. Her pain is worse with moving from sitting to standing. She also endorses morning stiffness. She recently started physical therapy. She has completed one session. She continues to report bilateral knee pain. She endorses worsening pain with the cold weather. Her pain is worse with standing and walking. She continues to take Percocet as needed with benefit and without adverse effects. She denies any other health changes. Her pain today is 8/10.    Interval History 10/5/2022:  She returns for follow-up.  Her knees are doing well.  She has some discomfort but not enough to do procedures.  Her main complaint is lumbar spine pain for the past few weeks that is radiating to the dorsal aspect of both lower extremities.  She has no red flag signs/symptoms.  No new bowel or bladder symptomatology.  The pain radiates from the back down to the plantar aspect of both feet.  It is activity related.  Rest helps some but it does not resolve the pain.  She has not been in therapy for a while.  No recent imaging.  No recent weight changes.  Otherwise, no new symptomatology.  She goes regularly to her primary care physician for health maintenance.    Interval History 8/9/2022:  Alivia Thomas presents to the clinic for a follow-up appointment for low back and knee pain. She is s/p right genicular RFA on 5/9/2022 and left genicular RFA on 5/22/2022. She reports 60% relief of her knee pain. She also had panniculectomy on 6/14/2022. She is healing well. She continues to report intermittent low back pain, worse with prolonged activity. She denies any radicular leg pain. Her pain is worse prolonged standing and walking. She continues to perform a homoe exercise routine. She continues to take Percocet as needed with benefit and  without adverse effects. She denies any other health changes. Her pain today is 7/10.    Interval History 4/9/2022:  The patient returns to clinic today for follow-up bilateral L3, 4, 5 RFA last month.  She reports that she is feeling very well following the procedure and reports 60-70% pain relief.  Today, she reports that her bilateral knee pain has continued to worsen, and she would like to go ahead and repeat bilateral genicular RFA as soon as possible.  She denies any new numbness, tingling, weakness, saddle anesthesia or bowel/bladder incontinence.    Interval History 12/28/2021:  The patient returns to clinic today for follow up via virtual visit. She continues to report bilateral knee pain. This pain is worse with prolonged standing and walking. She continues to report low back and buttock pain. She denies any radicular leg pain. She continues to report a home exercise routine. She continues to report benefit with Percocet. She denies any adverse effects. She denies any other health changes.    Pain Disability Index Review:  Last 3 PDI Scores 7/10/2023 11/22/2022 10/5/2022   Pain Disability Index (PDI) 50 40 48       Pain Medications:  Percocet 10/325 mg TID PRN pain    Opioid Contract: yes     report:  Reviewed and consistent with medication use as prescribed.    Pain Procedures:   steroid inj and visco-supplementation (series of 3) in left knee- not helpful  3/31/14 Bilateral genicular nerve blocks  2/16/16 Bilateral L2,3,4,5 MBB  3/1/16 Bilateral L2,3,4,5 MBB   4/6/16 Left L2,3,4,5 RFA- significant relief  4/20/16 Right L2,3,4,5 RFA- significant relief  10/5/16 Left L2,3,4,5 cooled RFA  10/19/16 Right L2,3,4,5 cooled RFA  4/26/17 Left L2,3,4,5 cooled RFA  5/9/17 Right L2,3,4,5 cooled RFA  12/26/2017- Left L2,3,4,5 cooled RFA  1/9/2018- Right L2,3,4,5 cooled RFA  6/26/18 Left L2,3,4,5 cooled RFA- 60% relief  7/10/18 Right L2,3,4,5 cooled RFA- 60% relief  9/28/18 TPIs- significant relief  12/27/18 Left  L2,3,4,5 RFA- 70% relief  1/29/19 Right L2,3,4,5 RFA- 70% relief  6/18/19 Left L2,3,4,5 RFA- 70% relief  7/9/19 Right L2,3,4,5 RFA- 50% relief  12/19/19 Left L2,3,4,5 RFA- 80% relief  12/31/19 Right L2,3,4,5 RFA- 50% relief  3/2/2020- Right genicular nerve block- 70% relief for one month  3/12/20 Left subacromial bursa injection- 90% relief  5/18/2020- Left genicular nerve block- 70%  6/9/2020- Bilateral SI joint injections- 50% relief  10/13/2020- Right genicular RFA- 50% relief   11/3/2020- Left genicular RFA- 50% relief  12/15/2020- Left L2,3,4,5 RFA  12/29/2020- Right L2,3,4,5 RFA  4/26/2021- Left subacromial bursa'  5/11/2021- Bilateral SI joint injections   6/28/2021- Left genicular RFA  7/13/2021- Right genicular RFA  8/24/2021- Right L2,3,4,5 RFA  9/11/2021- Left L2,3,4,5 RFA  3/7/2022- Right L2,3,4,5 RFA  3/21/2022- Left L2,3,4,5 RFA  5/9/2022- Right genicular RFA  5/23/2022- Left genicular RFA  12/12/2022- Right genicular RFA  1/9/2023- Left genicular RFA  3/6/2023- Right L3,4,5 RFA    Physical Therapy/Home Exercise: yes    Imaging:   Xray Knee 3/6/2019:  FINDINGS:  Postop bilateral knee replacements.  The the the the irregularity about the left patella with soft tissue calcifications similar.  Small joint effusion.  Some irregularity of the right patella unchanged as well.     IMPRESSION:      Postop bilateral knee replacement with irregularity about the patella as above.    MRI Cervical Spine 4/24/2018:  FINDINGS:  There is reversal of the normal cervical lordosis which could be related to patient positioning versus muscle spasm.     Cervical vertebral body heights are well maintained without evidence of acute fracture or dislocation.  Marrow signal is unremarkable.     Spinal canal contents are unremarkable.  No abnormal masses or collections.     Individual levels as detailed below:     C2-3: No significant spinal canal stenosis or neuroforaminal narrowing.     C3-4: Facet arthropathy.  No significant  spinal canal stenosis or neuroforaminal narrowing.     C4-5: Facet arthropathy.  Small broad-based disc osteophyte complex.  Mild right neuroforaminal narrowing.  Mild spinal canal stenosis.     C5-6: Facet arthropathy.  Uncovertebral joint spurring.  Broad-based posterior disc osteophyte complex.  Mild right neuroforaminal narrowing.  Mild spinal canal stenosis.     C6-7: Facet arthropathy.  No significant spinal canal stenosis or neuroforaminal narrowing.     C7-T1: No significant spinal canal stenosis or neuroforaminal narrowing.     Paraspinal muscles are unremarkable.  Soft tissues are intact.     IMPRESSION:      Mild multilevel cervical spondylosis with mild spinal canal stenosis and mild right neuroforaminal narrowing at C4-5 and C5-6.    Xray Lumbar Spine 9/21/2015:  Results: AP, lateral neutral, lateral flexion , lateral extension, bilateral oblique and spot views.  The alignment of the lumbar spine demonstrates a mild levoscoliosis .  11-mm anterior listhesis of L4 relative to L5 with no translational abnormalities   seen on flexion and extension views.  The vertebral body heights  are well-maintained  , mild disk space narrowing L4-L5 and L5-S1.   Mild anterior and marginal osteophyte formation seen  throughout the lumbar spine  .  The oblique views demonstrate no   definite spondylolysis.  There is facet joint osseous hypertrophy noted at L3-L4 and L4-L5.  IMPRESSION:         The Significant spondylosis of the lumbar spine with grade 1 anterior listhesis L4 relative to L5.  Facet joint osseous hypertrophy L3-L4 and L4-5.    Allergies:   Review of patient's allergies indicates:   Allergen Reactions    Strawberry Anaphylaxis       Current Medications:   Current Outpatient Medications   Medication Sig Dispense Refill    albuterol (VENTOLIN HFA) 90 mcg/actuation inhaler INHALE TWO PUFFS INTO LUNGS EVERY 4 TO 6 HOURS AS NEEDED FOR SHORTNESS OF BREATH AND FOR WHEEZING 18 g 1    allopurinoL (ZYLOPRIM) 300 MG  tablet Take 1 tablet (300 mg total) by mouth 2 (two) times daily. 180 tablet 3    atorvastatin (LIPITOR) 20 MG tablet Take 1 tablet (20 mg total) by mouth once daily. 90 tablet 3    b complex vitamins tablet Take 1 tablet by mouth every other day.       calcium citrate/vitamin D2 (ISIAH-CITRATE ORAL) Take 500 mg by mouth 2 (two) times daily.      colchicine (MITIGARE) 0.6 mg Cap Take 1 capsule (0.6 mg total) by mouth daily as needed (flare up). 90 capsule 3    cyanocobalamin (VITAMIN B-12) 1000 MCG tablet Take 100 mcg by mouth every morning.      diclofenac sodium (VOLTAREN) 1 % Gel Apply 2 g topically 4 (four) times daily as needed. To painful area on the feet. 500 g 5    fluconazole (DIFLUCAN) 150 MG Tab Take 1 tablet (150 mg total) by mouth Every 3 (three) days. 3 tablet 0    FLUoxetine (PROZAC) 20 mg/5 mL (4 mg/mL) solution TAKE 5 MLS BY MOUTH ONCE DAILY. 450 mL 3    fluticasone propionate (FLONASE) 50 mcg/actuation nasal spray 1 SPRAY BY EACH NOSTRIL ROUTE 2 TIMES DAILY AS NEEDED FOR RHINITIS. 48 g 3    indomethacin (INDOCIN) 50 MG capsule TAKE 1 CAPSULE BY MOUTH 2 TIMES DAILY WITH MEALS (Patient taking differently: Take 50 mg by mouth as needed.) 30 capsule 2    [START ON 8/5/2023] ketorolac 0.5% (ACULAR) 0.5 % Drop Place 1 drop into the left eye 4 (four) times daily. 5 mL 1    LIDOcaine (XYLOCAINE) 5 % Oint ointment Apply topically as needed. 35.44 g 6    MULTIVIT-IRON-MIN-FOLIC ACID 3,500-18-0.4 UNIT-MG-MG ORAL CHEW Take 1 tablet by mouth 2 (two) times daily.       nystatin (MYCOSTATIN) powder Apply topically 2 (two) times daily. 60 g 3    [START ON 8/5/2023] ofloxacin (OCUFLOX) 0.3 % ophthalmic solution Place 1 drop into the left eye 4 (four) times daily. for 10 days 5 mL 1    omeprazole (PRILOSEC) 20 MG capsule Take 1 capsule (20 mg total) by mouth every morning. 90 capsule 3    oxybutynin (DITROPAN-XL) 5 MG TR24 TAKE 1 TABLET BY MOUTH ONCE DAILY. 90 tablet 3    oxyCODONE-acetaminophen (PERCOCET)  mg  per tablet Take 1 tablet by mouth every 8 (eight) hours as needed for Pain. 90 tablet 0    [START ON 8/8/2023] prednisoLONE acetate (PRED FORTE) 1 % DrpS Place 1 drop into the left eye 4 (four) times daily. 5 mL 1    urea (CARMOL) 40 % Crea Apply topically once daily. To dry skin on the feet. 120 g 5    vitamin D 185 MG Tab Take 5,000 mg by mouth every morning.        No current facility-administered medications for this visit.     Facility-Administered Medications Ordered in Other Visits   Medication Dose Route Frequency Provider Last Rate Last Admin    0.9%  NaCl infusion   Intravenous Continuous Lina Wagner MD 25 mL/hr at 12/15/20 1506 25 mL/hr at 12/15/20 1506       REVIEW OF SYSTEMS:    GENERAL:  No weight loss, malaise or fevers.  HEENT:  Negative for frequent or significant headaches.  NECK:  Negative for lumps, goiter, pain and significant neck swelling.  RESPIRATORY:  Negative for cough, wheezing or shortness of breath.  CARDIOVASCULAR:  Negative for chest pain, leg swelling or palpitations. HTN  GI:  Negative for abdominal discomfort, blood in stools or black stools or change in bowel habits. GERD  MUSCULOSKELETAL:  See HPI.  SKIN:  Negative for lesions, rash, and itching.  PSYCH:  Negative for sleep disturbance, mood disorder and recent psychosocial stressors.  HEMATOLOGY/LYMPHOLOGY:  Negative for prolonged bleeding, bruising easily or swollen nodes.  NEURO:   No history of headaches, syncope, paralysis, seizures or tremors.  ENDO: Diabetes  All other reviewed and negative other than HPI.    Past Medical History:  Past Medical History:   Diagnosis Date    Allergy     Anemia     Asthma     Bilateral shoulder bursitis     Cervical stenosis of spine     Chronic pain     DDD (degenerative disc disease), cervical     Depression 1/28/2019    Diabetes mellitus type II     DJD (degenerative joint disease) of knee 6/19/2014    Facet arthritis of lumbar region 12/17/2015    Facet syndrome 12/17/2015    GERD  (gastroesophageal reflux disease)     Heartburn     Hyperlipidemia     Hypertension     Morbid obesity     Neuromuscular disorder     PADDY (obstructive sleep apnea)     Proteinuria     Right carpal tunnel syndrome     Sacroiliitis 6/13/2018    Sacroiliitis     Sleep apnea        Past Surgical History:  Past Surgical History:   Procedure Laterality Date    CARPAL TUNNEL RELEASE      CARPAL TUNNEL RELEASE  1980s    left    CARPAL TUNNEL RELEASE  2012    right    COLONOSCOPY N/A 6/15/2020    Procedure: COLONOSCOPY;  Surgeon: Jc Koo MD;  Location: [facility] (4TH FLR);  Service: Endoscopy;  Laterality: N/A;  COVID screening on 6/13/20 at [facility]  pt updated on drop off location and no visitor policy-    ESOPHAGOGASTRODUODENOSCOPY N/A 3/27/2019    Procedure: EGD (ESOPHAGOGASTRODUODENOSCOPY);  Surgeon: Robbin Bar MD;  Location: [facility] (2ND FLR);  Service: Endoscopy;  Laterality: N/A;  BMI 61.3/2nd floor case/svn    INJECTION OF JOINT Bilateral 6/9/2020    Procedure: INJECTION, JOINT, BILATERAL SI;  Surgeon: Lina Wagner MD;  Location: [facility] PAIN MGT;  Service: Pain Management;  Laterality: Bilateral;  B/L SI Joint Injection    INJECTION OF JOINT Bilateral 5/11/2021    Procedure: INJECTION, JOINT, SACROILIAC (SI) need consent;  Surgeon: Lina Wagner MD;  Location: [facility] PAIN MGT;  Service: Pain Management;  Laterality: Bilateral;    JOINT REPLACEMENT Bilateral     with 2 revisions on rt    KNEE SURGERY  3/2010    orthroscope    KNEE SURGERY  6-19-14    left TKR    LAPAROSCOPIC SLEEVE GASTRECTOMY N/A 8/28/2019    Procedure: GASTRECTOMY, SLEEVE, LAPAROSCOPIC with intraop EGD;  Surgeon: Heriberto Clements MD;  Location: [facility] OR 2ND FLR;  Service: General;  Laterality: N/A;    PANNICULECTOMY N/A 6/14/2022    Procedure: PANNICULECTOMY;  Surgeon: Rhett Gray MD;  Location: [facility] OR [address] [place] [address];  Service: Plastics;  Laterality: N/A;    RADIOFREQUENCY ABLATION Right 7/9/2019     Procedure: RADIOFREQUENCY ABLATION;  Surgeon: Lina Wagner MD;  Location: [facility] PAIN [facility]GT;  Service: Pain Management;  Laterality: Right;  RIGHT RFA L2,3,4,5  2 of 2    RADIOFREQUENCY ABLATION Left 12/19/2019    Procedure: RADIOFREQUENCY ABLATION, LEFT L2-L3-L4-L5 MEDIAL BRANCH 1 OF 2;  Surgeon: Lina Wagner MD;  Location: [facility] PAIN MGT;  Service: Pain Management;  Laterality: Left;    RADIOFREQUENCY ABLATION Right 12/31/2019    Procedure: RADIOFREQUENCY ABLATION, RIGHT L2-L3-L4-L5  2 OF 2;  Surgeon: Lina Wagner MD;  Location: [facility]IN [facility]GT;  Service: Pain Management;  Laterality: Right;    RADIOFREQUENCY ABLATION Right 10/13/2020    Procedure: RADIOFREQUENCY ABLATION, Genicular Cooled 1 of 2;  Surgeon: Lina Wagner MD;  Location: [facility]IN [facility]GT;  Service: Pain Management;  Laterality: Right;    RADIOFREQUENCY ABLATION Left 11/3/2020    Procedure: RADIOFREQUENCY ABLATION, GENICULAR COOLED  2 OF 2;  Surgeon: Lina Wagner MD;  Location: [facility] PAIN MGT;  Service: Pain Management;  Laterality: Left;    RADIOFREQUENCY ABLATION Left 12/15/2020    Procedure: RADIOFREQUENCY ABLATION LEFT L2,3,4,5 1 of 2;  Surgeon: Lina Wagner MD;  Location: [facility] PAIN MGT;  Service: Pain Management;  Laterality: Left;    RADIOFREQUENCY ABLATION Right 12/29/2020    Procedure: RADIOFREQUENCY ABLATION RIGHT L2,3,4,5 2 of 2;  Surgeon: Lina Wagner MD;  Location: [facility] PAIN MGT;  Service: Pain Management;  Laterality: Right;    RADIOFREQUENCY ABLATION Left 6/29/2021    Procedure: RADIOFREQUENCY ABLATION GENICULAR COOLED;  Surgeon: Lina Wagner MD;  Location: [facility] PAIN MGT;  Service: Pain Management;  Laterality: Left;  1 of 2    RADIOFREQUENCY ABLATION Right 7/13/2021    Procedure: RADIOFREQUENCY ABLATION GENICULAR;  Surgeon: Lina Wagner MD;  Location: [facility] PAIN MGT;  Service: Pain Management;  Laterality: Right;  2 of 2    RADIOFREQUENCY ABLATION Right 8/24/2021    Procedure: RADIOFREQUENCY  ABLATION, L2-L3-L4-L5 MEDIAL BRANCH 1 OF 2;  Surgeon: Lina Wagner MD;  Location: [facility] PAIN MGT;  Service: Pain Management;  Laterality: Right;    RADIOFREQUENCY ABLATION Left 9/11/2021    Procedure: RADIOFREQUENCY ABLATION, L2-L3-L4-L5 MEDIAL BR ANCH 2 OF 2;  Surgeon: Lina Wagner MD;  Location: [facility] PAIN [facility]GT;  Service: Pain Management;  Laterality: Left;    RADIOFREQUENCY ABLATION Right 3/7/2022    Procedure: RADIOFREQUENCY ABLATION RIGHT L3,4,5 1 of 2, needs consent;  Surgeon: Israel Hurtado MD;  Location: [facility] PAIN [facility]GT;  Service: Pain Management;  Laterality: Right;    RADIOFREQUENCY ABLATION Left 3/21/2022    Procedure: RADIOFREQUENCY ABLATION RIGHT L3,4,5 2 of 2, needs consent;  Surgeon: Israel Hurtado MD;  Location: [facility] PAIN [facility]GT;  Service: Pain Management;  Laterality: Left;    RADIOFREQUENCY ABLATION Right 5/9/2022    Procedure: RADIOFREQUENCY ABLATION RIGHT GENICULAR COOLED 1 of 2;  Surgeon: Israel Hurtado MD;  Location: [facility] PAIN MGT;  Service: Pain Management;  Laterality: Right;    RADIOFREQUENCY ABLATION Left 5/23/2022    Procedure: RADIOFREQUENCY ABLATION LEFT GENICULAR COOLED  2 of 2;  Surgeon: Israel Hurtado MD;  Location: [facility] PAIN MGT;  Service: Pain Management;  Laterality: Left;    RADIOFREQUENCY ABLATION Right 12/12/2022    Procedure: RADIOFREQUENCY ABLATION RIGHT GENICULAR COOLED  ONE OF TWO  NEEDS CONSENT;  Surgeon: Israel Hurtado MD;  [facility];  Service: Pain Management;  Laterality: Right;    RADIOFREQUENCY ABLATION Left 1/9/2023    Procedure: RADIOFREQUENCY ABLATION LEFT GENICULAR COOLED  TWO OF TWO NEEDS CONSENT;  Surgeon: Israel Hurtado MD;  Location: [facility] PAIN [facility]GT;  Service: Pain Management;  Laterality: Left;    RADIOFREQUENCY ABLATION Right 3/6/2023    Procedure: RADIOFREQUENCY ABLATION RIGHT L3,L4,L5;  Surgeon: Israel Hurtado MD;  Location: [facility] PAIN [facility]GT;  Service: Pain Management;  Laterality: Right;    RADIOFREQUENCY ABLATION Left 5/22/2023    Procedure: RADIOFREQUENCY  ABLATION LEFT L3,L4,L5;  Surgeon: Israel Hurtado MD;  Location: [facility]IN [facility]GT;  Service: Pain Management;  Laterality: Left;  4/3 LM TO R/S    RADIOFREQUENCY ABLATION OF LUMBAR MEDIAL BRANCH NERVE AT SINGLE LEVEL Left 6/26/2018    Procedure: RADIOFREQUENCY ABLATION, NERVE, MEDIAL BRANCH, LUMBAR, 1 LEVEL;  Surgeon: Lina Wagner MD;  Location: [facility] PAIN [facility]GT;  Service: Pain Management;  Laterality: Left;  Left Cooled RFA @ L2,3,4,5  [id number]  with Sedation    1 of 2    RADIOFREQUENCY ABLATION OF LUMBAR MEDIAL BRANCH NERVE AT SINGLE LEVEL Right 7/10/2018    Procedure: RADIOFREQUENCY ABLATION, NERVE, MEDIAL BRANCH, LUMBAR, 1 LEVEL;  Surgeon: Lina Wagner MD;  Location: [facility]IN [facility]GT;  Service: Pain Management;  Laterality: Right;  RIght Cooled RFA @ L2,3,4,5  [id number] with Sedation    2 of 2    TRIGGER FINGER RELEASE Right 2017    TRIGGER FINGER RELEASE Left 7/29/2019    Procedure: RELEASE, TRIGGER FINGER, Left Thumb;  Surgeon: Velma Garcia MD;  Location: [facility] OR;  Service: Orthopedics;  Laterality: Left;  Local w/ MAC       Family History:  Family History   Problem Relation Age of Onset    Diabetes Mother     Cataracts Mother     Diabetes Father     Cataracts Father     Hypertension Sister     Diabetes Sister     No Known Problems Sister     Cancer Sister         lymphoma     Coronary artery disease Brother     Diabetes Brother     Diabetes Brother     Hypotension Brother     Kidney failure Brother     Hypotension Brother     Amblyopia Neg Hx     Blindness Neg Hx     Glaucoma Neg Hx     Macular degeneration Neg Hx     Retinal detachment Neg Hx     Strabismus Neg Hx     Stroke Neg Hx     Thyroid disease Neg Hx     Anesthesia problems Neg Hx        Social History:  Social History     Socioeconomic History    Marital status:    Tobacco Use    Smoking status: Never    Smokeless tobacco: Never   Substance and Sexual Activity    Alcohol use: Not Currently     Comment: occasionally     Drug use:  "No    Sexual activity: Yes     Partners: Female     Birth control/protection: None   Social History Narrative    Disabled. The patient is the youngest of 6 siblings. Single. Lives with single-sex partner.                  Social Determinants of Health     Financial Resource Strain: Unknown    Difficulty of Paying Living Expenses: Patient refused   Food Insecurity: No Food Insecurity    Worried About Running Out of Food in the Last Year: Never true    Ran Out of Food in the Last Year: Never true   Transportation Needs: No Transportation Needs    Lack of Transportation (Medical): No    Lack of Transportation (Non-Medical): No   Physical Activity: Unknown    Days of Exercise per Week: Patient refused    Minutes of Exercise per Session: Patient refused   Stress: Stress Concern Present    Feeling of Stress : Very much   Social Connections: Unknown    Frequency of Communication with Friends and Family: Twice a week    Frequency of Social Gatherings with Friends and Family: Never    Attends [employer] Services: More than 4 times per year    Active Member of Clubs or Organizations: No    Attends Club or Organization Meetings: Never    Marital Status: Patient refused   Housing Stability: Unknown    Unable to Pay for Housing in the Last Year: Patient refused    Unstable Housing in the Last Year: Patient refused       OBJECTIVE:    VITALS:    /80 (BP Location: Left arm, Patient Position: Sitting)   Pulse 83   Temp 98.9 °F (37.2 °C) (Oral)   Resp 19   Ht 5' 5" (1.651 m)   Wt 112.5 kg (248 lb 0.3 oz)   LMP 06/26/2018   BMI 41.27 kg/m²     PHYSICAL EXAMINATION:    GENERAL: Well appearing, in no acute distress, alert and oriented x3.   PSYCH:  Mood and affect appropriate.  SKIN: Skin color, texture, turgor normal, no rashes or lesions.   HEAD/FACE:  Normocephalic, atraumatic.   CV: RRR with palpation of the radial artery.  PULM: No evidence of respiratory difficulty, symmetric chest rise.  BACK: Negative SLR " bilaterally. SLR is positive bilaterally at L5 distribution. There is pain with palpation over lumbar facet joints bilaterally. Limited ROM with pain on flexion > extension.  Positive facet loading bilaterally  EXTREMITIES: There is pain with palpation to bilateral SI joints.  JENNA is positive on the right.   MUSCULOSKELETAL: 4/5 strength in right ankle with plantar and dorsiflexion. 4/5 strength in left ankle with plantar and dorsiflexion. 5/5 strength with right knee flexion and extension. 5/5 strength with left knee flexion and extension. 5/5 strength in right EHL, 5/5 strength in left EHL.  NEURO:  Plantar response are downgoing. No clonus. Decreased sensation at a bilateral L5 and S1 distribution bilaterally.  GAIT: Antalgic- ambulates without assistance.      ASSESSMENT: 57 y.o. year old female with low back and knee pain, consistent with the followin. Myofascial pain  BUPivacaine (PF) 0.5% (5 mg/mL) injection 45 mg    triamcinolone acetonide injection 40 mg      2. Lumbosacral radiculopathy        3. Spondylosis of lumbosacral region without myelopathy or radiculopathy        4. Chronic pain disorder        5. DDD (degenerative disc disease), lumbar        6. Chronic pain syndrome        7. Sacroiliac joint pain        8. Dorsalgia, unspecified  MRI Lumbar Spine Without Contrast                PLAN:     - Previous imaging reviewed today.    - Patient is now having worsened back pain with new onset radicular symptoms. Will order lumbar MRI and plan for KERI (level pending until MRI)    - We can repeat bilateral genicular RFA as needed. Knee pain is tolerable.    - Will give TPIs today as per below.    - Continue Percocet 10/325 mg TID PRN.      - The patient is here today for a refill of current pain medications and they believe these provide effective pain control and improvements in quality of life.  The patient notes no serious side effects, and feels the benefits outweigh the risks.  The patient  was reminded of the pain contract that they signed previously as well as the risks and benefits of the medication including possible death.  The updated [place] [employer]acy prescription monitoring program was reviewed, and the patient has been found to be compliant with current treatment plan.     - Previous UDS from 11/22/2022 reviewed and consistent.     - I have stressed the importance of physical activity and a home exercise plan to help with pain and improve health.    - RTC 2 weeks after injection. I will call with MRI results.    - Counseled patient regarding the importance of activity modification and constant sleeping habits.    The above plan and management options were discussed at length with patient. Patient is in agreement with the above and verbalized understanding.    Virginia Sanchez  07/10/2023

## 2023-07-11 DIAGNOSIS — M47.816 LUMBAR SPONDYLOSIS: ICD-10-CM

## 2023-07-11 DIAGNOSIS — G89.4 CHRONIC PAIN SYNDROME: ICD-10-CM

## 2023-07-11 RX ORDER — OXYCODONE AND ACETAMINOPHEN 10; 325 MG/1; MG/1
1 TABLET ORAL EVERY 8 HOURS PRN
Qty: 90 TABLET | Refills: 0 | Status: SHIPPED | OUTPATIENT
Start: 2023-07-11 | End: 2023-08-09 | Stop reason: SDUPTHER

## 2023-07-11 NOTE — TELEPHONE ENCOUNTER
Patient requesting refill on 7/11/23  Last office visit 7/10/23   shows last refill on oxycodone on 6/12/23  Patient does have a pain contract on file with [facility] department  Patient last UDS 11/22/23 was consistent with current therapy   CODEINE  Not Detected  Not Detected   Not Detected   Not Detected  Not Detected    MORPHINE  Not Detected  Not Detected   Not Detected   Not Detected  Not Detected    6-ACETYLMORPHINE  Not Detected  Not Detected   Not Detected   Not Detected  Not Detected    OXYCODONE  Present  Present   Not Detected   Not Detected  Present    NOROYXCODONE  Present  Present   Present   Present  Present    OXYMORPHONE  Present  Present   Not Detected   Not Detected  Not Detected    NOROXYMORPHONE  Not Detected  Not Detected   Not Detected   Not Detected  Not Detected    HYDROCODONE  Not Detected  Not Detected   Not Detected   Not Detected  Not Detected    NORHYDROCODONE  Not Detected  Not Detected   Not Detected   Not Detected  Not Detected    HYDROMORPHONE  Not Detected  Not Detected   Not Detected   Not Detected  Not Detected    BUPRENORPHINE  Not Detected  Not Detected   Not Detected   Not Detected  Not Detected    NORUBPRENORPHINE  Not Detected  Not Detected   Not Detected   Not Detected  Not Detected    FENTANYL  Not Detected  Not Detected   Not Detected   Not Detected  Not Detected    NORFENTANYL  Not Detected  Not Detected   Not Detected   Not Detected  Not Detected    MEPERIDINE METABOLITE  Not Detected  Not Detected   Not Detected   Not Detected  Not Detected    TAPENTADOL  Not Detected  Not Detected   Not Detected   Not Detected  Not Detected    TAPENTADOL-O-SULF  Not Detected  Not Detected   Not Detected   Not Detected  Not Detected    METHADONE  Not Detected  Not Detected   Not Detected   Not Detected  Not Detected    TRAMADOL  Not Detected  Not Detected   Not Detected   Not Detected  Not Detected    AMPHETAMINE  Not Detected  Not Detected   Not Detected   Not  Detected  Not Detected    METHAMPHETAMINE  Not Detected  Not Detected   Not Detected   Not Detected  Not Detected    MDMA- ECSTASY  Not Detected  Not Detected   Not Detected   Not Detected  Not Detected    MDA  Not Detected  Not Detected   Not Detected   Not Detected  Not Detected    MDEA- Kait  Not Detected  Not Detected   Not Detected   Not Detected  Not Detected    METHYLPHENIDATE  Not Detected  Not Detected   Not Detected   Not Detected  Not Detected    PHENTERMINE  Not Detected  Not Detected   Not Detected   Not Detected  Not Detected    BENZOYLECGONINE  Not Detected  Not Detected   Not Detected   Not Detected  Not Detected    ALPRAZOLAM  Not Detected  Not Detected   Not Detected   Not Detected  Not Detected    ALPHA-OH-ALPRAZOLAM  Not Detected  Not Detected   Not Detected   Not Detected  Not Detected    CLONAZEPAM  Not Detected  Not Detected   Not Detected   Not Detected  Not Detected    7-AMINOCLONAZEPAM  Not Detected  Not Detected   Not Detected   Not Detected  Not Detected    DIAZEPAM  Not Detected  Not Detected   Not Detected   Not Detected  Not Detected    NORDIAZEPAM  Not Detected  Not Detected   Not Detected   Not Detected  Not Detected    OXAZEPAM  Not Detected  Not Detected   Not Detected   Not Detected  Not Detected    TEMAZEPAM  Not Detected  Not Detected   Not Detected   Not Detected  Not Detected    Lorazepam  Not Detected  Not Detected   Not Detected   Not Detected  Not Detected    MIDAZOLAM  Not Detected  Not Detected   Not Detected   Not Detected  Not Detected    ZOLPIDEM  Not Detected  Not Detected   Not Detected   Not Detected  Not Detected    BARBITURATES  Not Detected  Not Detected   Not Detected   Not Detected  Not Detected    Creatinine, Urine 20.0 - 400.0 mg/dL 112.3  93.0  86.0 R, CM  66.1  113.0 R, CM  91.8  163.4    ETHYL GLUCURONIDE  Not Detected  Not Detected   Not Detected   Not Detected  Not Detected    MARIJUANA METABOLITE  Not Detected  Not Detected   Not Detected   Not Detected   Not Detected    PCP  Not Detected  Not Detected   Not Detected   Not Detected  Not Detected    CARISOPRODOL  Not Detected  Not Detected CM   Not Detected CM   Not Detected CM  Not Detected CM    Comment: The carisoprodol immunoassay has cross-reactivity to   carisoprodol and meprobamate.    Naloxone  Not Detected  Not Detected         Gabapentin  Not Detected  Not Detected         Pregabalin  Not Detected  Not Detected         Alpha-OH-Midazolam  Not Detected  Not Detected         Zolpidem Metabolite  Not Detected  Not Detected

## 2023-07-11 NOTE — TELEPHONE ENCOUNTER
----- Message from Bindu Suero sent at 7/11/2023  3:01 PM CDT -----  Regarding: Refill Request  Can the clinic reply in [facility]:NO          Please refill the medication(s) listed below. Please call the patient when the prescription(s) is ready for  at this phone number  [phone]           Medication #1  oxyCODONE-acetaminophen (PERCOCET)  mg per tablet             Medication #2               Preferred Pharmacy:  [facility] [address]

## 2023-07-12 ENCOUNTER — HOSPITAL ENCOUNTER (OUTPATIENT)
Dept: RADIOLOGY | Facility: HOSPITAL | Age: 58
Discharge: HOME | End: 2023-07-12
Attending: NURSE PRACTITIONER
Payer: MEDICARE

## 2023-07-12 DIAGNOSIS — N85.8 UTERINE MASS: Primary | ICD-10-CM

## 2023-07-12 DIAGNOSIS — M54.9 DORSALGIA, UNSPECIFIED: ICD-10-CM

## 2023-07-12 DIAGNOSIS — R39.9 ABNORMAL FINDING OF KIDNEY: ICD-10-CM

## 2023-07-12 DIAGNOSIS — M48.061 SPINAL STENOSIS OF LUMBAR REGION, UNSPECIFIED WHETHER NEUROGENIC CLAUDICATION PRESENT: Primary | ICD-10-CM

## 2023-07-12 PROCEDURE — 72148 MRI LUMBAR SPINE WITHOUT CONTRAST: ICD-10-PCS | Mod: 26,,, | Performed by: RADIOLOGY

## 2023-07-12 PROCEDURE — 72148 MRI LUMBAR SPINE W/O DYE: CPT | Mod: TC

## 2023-07-12 PROCEDURE — 72148 MRI LUMBAR SPINE W/O DYE: CPT | Mod: 26,,, | Performed by: RADIOLOGY

## 2023-07-13 ENCOUNTER — TELEPHONE (OUTPATIENT)
Dept: PAIN MEDICINE | Facility: CLINIC | Age: 58
End: 2023-07-13
Payer: MEDICARE

## 2023-07-13 NOTE — TELEPHONE ENCOUNTER
Spoke with patient regarding lumbar MRI and incidental findings of partially visualized markedly enlarged leiomyomatous uterus. I ordered pelvic ultrasound and messaged her GYN, Dr. Silva. Also discussed finding of asymmetric enlargement of the right kidney. I ordered retroperitoneal ultrasound and messaged her PCP, Dr. Richardson.

## 2023-07-17 ENCOUNTER — TELEPHONE (OUTPATIENT)
Dept: PAIN MEDICINE | Facility: CLINIC | Age: 58
End: 2023-07-17
Payer: MEDICARE

## 2023-07-17 RX ORDER — TIZANIDINE 4 MG/1
4 TABLET ORAL EVERY 8 HOURS PRN
Qty: 90 TABLET | Refills: 2 | Status: SHIPPED | OUTPATIENT
Start: 2023-07-17 | End: 2023-11-10 | Stop reason: SDUPTHER

## 2023-07-17 NOTE — TELEPHONE ENCOUNTER
----- Message from Lorena Sim sent at 7/17/2023 12:29 PM CDT -----  Name of Who is Calling: DONTAE MOON [[id number]]              What is the request in detail: Patient requesting a call back to discuss in pain and procedure isn't until Monday 7/24. Patient is asking for muscle relaxers.               Can the clinic reply by [facility]: No              What Number to Call Back if not in [facility]: [phone]     Patient expressed no known problems or needs

## 2023-07-17 NOTE — TELEPHONE ENCOUNTER
Staff contacted patient regarding patient call back message. Patient told staff that she would like to be prescribed muscle relaxers and that her current pain medication - oxycodone- is not helping. Patient told staff that Adeola prescribed her Tizanidine in the past, but staff did not see that in her medication history. She has been prescribed methocarbamol before and cyclobenzaprine

## 2023-07-21 ENCOUNTER — TELEPHONE (OUTPATIENT)
Dept: OBSTETRICS AND GYNECOLOGY | Facility: CLINIC | Age: 58
End: 2023-07-21
Payer: MEDICARE

## 2023-07-21 ENCOUNTER — PATIENT MESSAGE (OUTPATIENT)
Dept: OBSTETRICS AND GYNECOLOGY | Facility: CLINIC | Age: 58
End: 2023-07-21
Payer: MEDICARE

## 2023-07-21 ENCOUNTER — PATIENT MESSAGE (OUTPATIENT)
Dept: ADMINISTRATIVE | Facility: OTHER | Age: 58
End: 2023-07-21
Payer: MEDICARE

## 2023-07-21 ENCOUNTER — PATIENT MESSAGE (OUTPATIENT)
Dept: PAIN MEDICINE | Facility: OTHER | Age: 58
End: 2023-07-21
Payer: MEDICARE

## 2023-07-21 NOTE — TELEPHONE ENCOUNTER
----- Message from Priya Mejia sent at 7/21/2023 10:54 AM CDT -----  Regarding: earlier appt time  Name of Who is Calling:[facility] DONTAE NAGYN [[id number]]          What is the request in detail:pt has an appt Monday at 930. She has a procedure at [facility], same day, and she is supposed to be there at 1015. She is asking if she can come early for her appt w/ you, sometime around 8 . Please call back to let her know if it is ok to come early           Can the clinic reply by [facility]:yes          What [facility]:[phone]

## 2023-07-23 ENCOUNTER — TELEPHONE (OUTPATIENT)
Dept: ADMINISTRATIVE | Facility: OTHER | Age: 58
End: 2023-07-23
Payer: MEDICARE

## 2023-07-24 ENCOUNTER — HOSPITAL ENCOUNTER (OUTPATIENT)
Dept: RADIOLOGY | Facility: OTHER | Age: 58
Discharge: HOME | End: 2023-07-24
Attending: NURSE PRACTITIONER
Payer: MEDICARE

## 2023-07-24 ENCOUNTER — HOSPITAL ENCOUNTER (OUTPATIENT)
Facility: OTHER | Age: 58
Discharge: HOME | End: 2023-07-24
Attending: ANESTHESIOLOGY | Admitting: ANESTHESIOLOGY
Payer: MEDICARE

## 2023-07-24 ENCOUNTER — OFFICE VISIT (OUTPATIENT)
Dept: OBSTETRICS AND GYNECOLOGY | Facility: CLINIC | Age: 58
End: 2023-07-24
Payer: MEDICARE

## 2023-07-24 VITALS
BODY MASS INDEX: 40.32 KG/M2 | SYSTOLIC BLOOD PRESSURE: 151 MMHG | DIASTOLIC BLOOD PRESSURE: 93 MMHG | HEIGHT: 65 IN | HEART RATE: 61 BPM | OXYGEN SATURATION: 100 % | TEMPERATURE: 98 F | WEIGHT: 242 LBS | RESPIRATION RATE: 18 BRPM

## 2023-07-24 VITALS
WEIGHT: 244.38 LBS | SYSTOLIC BLOOD PRESSURE: 140 MMHG | DIASTOLIC BLOOD PRESSURE: 86 MMHG | BODY MASS INDEX: 40.67 KG/M2

## 2023-07-24 DIAGNOSIS — R39.9 ABNORMAL FINDING OF KIDNEY: ICD-10-CM

## 2023-07-24 DIAGNOSIS — Z80.9 FAMILY HISTORY OF CANCER: ICD-10-CM

## 2023-07-24 DIAGNOSIS — Z12.31 VISIT FOR SCREENING MAMMOGRAM: ICD-10-CM

## 2023-07-24 DIAGNOSIS — Z90.3 HISTORY OF SLEEVE GASTRECTOMY: Chronic | ICD-10-CM

## 2023-07-24 DIAGNOSIS — M54.16 LUMBAR RADICULOPATHY: ICD-10-CM

## 2023-07-24 DIAGNOSIS — E11.9 TYPE 2 DIABETES MELLITUS WITHOUT COMPLICATION, WITHOUT LONG-TERM CURRENT USE OF INSULIN: Chronic | ICD-10-CM

## 2023-07-24 DIAGNOSIS — G89.29 CHRONIC PAIN: ICD-10-CM

## 2023-07-24 DIAGNOSIS — D25.9 UTERINE LEIOMYOMA, UNSPECIFIED LOCATION: ICD-10-CM

## 2023-07-24 DIAGNOSIS — Z01.419 ENCOUNTER FOR GYNECOLOGICAL EXAMINATION WITHOUT ABNORMAL FINDING: Primary | ICD-10-CM

## 2023-07-24 DIAGNOSIS — R06.83 SNORING: ICD-10-CM

## 2023-07-24 DIAGNOSIS — M51.36 DDD (DEGENERATIVE DISC DISEASE), LUMBAR: Primary | ICD-10-CM

## 2023-07-24 DIAGNOSIS — N85.8 UTERINE MASS: ICD-10-CM

## 2023-07-24 DIAGNOSIS — E78.2 MIXED HYPERLIPIDEMIA: Chronic | ICD-10-CM

## 2023-07-24 DIAGNOSIS — F32.A DEPRESSION, UNSPECIFIED DEPRESSION TYPE: Chronic | ICD-10-CM

## 2023-07-24 DIAGNOSIS — J45.20 MILD INTERMITTENT ASTHMA WITHOUT COMPLICATION: Chronic | ICD-10-CM

## 2023-07-24 LAB — POCT GLUCOSE: 106 MG/DL (ref 70–110)

## 2023-07-24 PROCEDURE — 76856 US PELVIS COMPLETE NON OB: ICD-10-PCS | Mod: 26,,, | Performed by: RADIOLOGY

## 2023-07-24 PROCEDURE — 25000003 PHARM REV CODE 250: Performed by: ANESTHESIOLOGY

## 2023-07-24 PROCEDURE — 99999 PR PBB SHADOW E&M-EST. PATIENT-LVL IV: ICD-10-PCS | Mod: PBBFAC,,, | Performed by: OBSTETRICS & GYNECOLOGY

## 2023-07-24 PROCEDURE — 99999 PR PBB SHADOW E&M-EST. PATIENT-LVL IV: CPT | Mod: PBBFAC,,, | Performed by: OBSTETRICS & GYNECOLOGY

## 2023-07-24 PROCEDURE — 62323 PR INJ LUMBAR/SACRAL, W/IMAGING GUIDANCE: ICD-10-PCS | Mod: ,,, | Performed by: ANESTHESIOLOGY

## 2023-07-24 PROCEDURE — 82947 ASSAY GLUCOSE BLOOD QUANT: CPT | Performed by: ANESTHESIOLOGY

## 2023-07-24 PROCEDURE — 76770 US EXAM ABDO BACK WALL COMP: CPT | Mod: 26,,, | Performed by: RADIOLOGY

## 2023-07-24 PROCEDURE — G0101 CA SCREEN;PELVIC/BREAST EXAM: HCPCS | Mod: S$PBB,GZ,, | Performed by: OBSTETRICS & GYNECOLOGY

## 2023-07-24 PROCEDURE — 76856 US EXAM PELVIC COMPLETE: CPT | Mod: 26,,, | Performed by: RADIOLOGY

## 2023-07-24 PROCEDURE — 76770 US EXAM ABDO BACK WALL COMP: CPT | Mod: TC

## 2023-07-24 PROCEDURE — 76770 US RETROPERITONEAL COMPLETE: ICD-10-PCS | Mod: 26,,, | Performed by: RADIOLOGY

## 2023-07-24 PROCEDURE — 76856 US EXAM PELVIC COMPLETE: CPT | Mod: TC

## 2023-07-24 PROCEDURE — 99214 OFFICE O/P EST MOD 30 MIN: CPT | Mod: PBBFAC,PN,25 | Performed by: OBSTETRICS & GYNECOLOGY

## 2023-07-24 PROCEDURE — 25500020 PHARM REV CODE 255: Performed by: ANESTHESIOLOGY

## 2023-07-24 PROCEDURE — A4216 STERILE WATER/SALINE, 10 ML: HCPCS | Performed by: ANESTHESIOLOGY

## 2023-07-24 PROCEDURE — G0101 PR CA SCREEN;PELVIC/BREAST EXAM: ICD-10-PCS | Mod: S$PBB,GZ,, | Performed by: OBSTETRICS & GYNECOLOGY

## 2023-07-24 PROCEDURE — 63600175 PHARM REV CODE 636 W HCPCS: Performed by: ANESTHESIOLOGY

## 2023-07-24 PROCEDURE — 62323 NJX INTERLAMINAR LMBR/SAC: CPT | Performed by: ANESTHESIOLOGY

## 2023-07-24 PROCEDURE — 62323 NJX INTERLAMINAR LMBR/SAC: CPT | Mod: ,,, | Performed by: ANESTHESIOLOGY

## 2023-07-24 RX ORDER — LIDOCAINE HYDROCHLORIDE 10 MG/ML
INJECTION, SOLUTION EPIDURAL; INFILTRATION; INTRACAUDAL; PERINEURAL
Status: DISCONTINUED | OUTPATIENT
Start: 2023-07-24 | End: 2023-07-24 | Stop reason: HOSPADM

## 2023-07-24 RX ORDER — DEXAMETHASONE SODIUM PHOSPHATE 10 MG/ML
INJECTION INTRAMUSCULAR; INTRAVENOUS
Status: DISCONTINUED | OUTPATIENT
Start: 2023-07-24 | End: 2023-07-24 | Stop reason: HOSPADM

## 2023-07-24 RX ORDER — FENTANYL CITRATE 50 UG/ML
INJECTION, SOLUTION INTRAMUSCULAR; INTRAVENOUS
Status: DISCONTINUED | OUTPATIENT
Start: 2023-07-24 | End: 2023-07-24 | Stop reason: HOSPADM

## 2023-07-24 RX ORDER — LIDOCAINE HYDROCHLORIDE 20 MG/ML
INJECTION, SOLUTION INFILTRATION; PERINEURAL
Status: DISCONTINUED | OUTPATIENT
Start: 2023-07-24 | End: 2023-07-24 | Stop reason: HOSPADM

## 2023-07-24 RX ORDER — SODIUM CHLORIDE 9 MG/ML
INJECTION, SOLUTION INTRAVENOUS CONTINUOUS
Status: ACTIVE | OUTPATIENT
Start: 2023-07-24

## 2023-07-24 RX ORDER — MIDAZOLAM HYDROCHLORIDE 1 MG/ML
INJECTION INTRAMUSCULAR; INTRAVENOUS
Status: DISCONTINUED | OUTPATIENT
Start: 2023-07-24 | End: 2023-07-24 | Stop reason: HOSPADM

## 2023-07-24 RX ORDER — SODIUM CHLORIDE 9 MG/ML
INJECTION, SOLUTION INTRAMUSCULAR; INTRAVENOUS; SUBCUTANEOUS
Status: DISCONTINUED | OUTPATIENT
Start: 2023-07-24 | End: 2023-07-24 | Stop reason: HOSPADM

## 2023-07-24 NOTE — OP NOTE
Lumbar Interlaminar Epidural Steroid Injection under Fluoroscopic Guidance    The procedure, risks, benefits, and options were discussed with the patient. There are no contraindications to the procedure. The patent expressed understanding and agreed to the procedure. Informed written consent was obtained prior to the start of the procedure and can be found in the patient's chart.    PATIENT NAME: Alivia Thomas   MRN: [id number]     DATE OF PROCEDURE: 07/24/2023    PROCEDURE: Lumbar Interlaminar Epidural Steroid Injection L5/S1 under Fluoroscopic Guidance    PRE-OP DIAGNOSIS: Spinal stenosis of lumbar region, unspecified whether neurogenic claudication present [M48.061] Lumbar radiculopathy [M54.16]    POST-OP DIAGNOSIS: Same    PHYSICIAN: Israel Hurtado MD    ASSISTANTS: Julian Davis MD Fellow     MEDICATIONS INJECTED: Preservative-free Decadron 10mg with 1cc of Lidocaine 1% MPF and preservative free normal saline    LOCAL ANESTHETIC INJECTED: Xylocaine 2%     SEDATION: Versed 2mg and Fentanyl 25mcg                                                                                                                                                                                     Conscious sedation ordered by M.D. Patient re-evaluation prior to administration of conscious sedation. No changes noted in patient's status from initial evaluation. The patient's vital signs were monitored by RN and patient remained hemodynamically stable throughout the procedure.    Event Time In   Sedation Start 1127   Sedation End 1138     ESTIMATED BLOOD LOSS: None    COMPLICATIONS: None    TECHNIQUE: Time-out was performed to identify the patient and procedure to be performed. With the patient laying in a prone position, the surgical area was prepped and draped in the usual sterile fashion using ChloraPrep and a fenestrated drape. The level was determined under fluoroscopy guidance. Skin anesthesia was achieved by injecting Lidocaine 2%  over the injection site. The interlaminar space was then approached with a 20 gauge,  3.5 inch Tuohy needle that was introduced under fluoroscopic guidance in the AP, lateral and/or contralateral oblique imaging. Once the Ligamentum flavum was encountered loss of resistance to saline was used to enter the epidural space. With positive loss of resistance and negative aspiration for CSF or Blood, contrast dye Omnipaque (300mg/mL) was injected to confirm placement and there was no vascular runoff. 2 mL of the medication mixture listed above was injected slowly. Displacement of the radio opaque contrast after injection of the medication confirmed that the medication went into the area of the epidural space. The needles were removed and bleeding was nil. A sterile dressing was applied. No specimens collected. The patient tolerated the procedure well.     PAIN BEFORE THE PROCEDURE: 10/10    PAIN AFTER THE PROCEDURE: 6/10    The patient was monitored after the procedure in the recovery area. They were given post-procedure and discharge instructions to follow at home. The patient was discharged in a stable condition.    Ciro Chung MD    I reviewed and edited the fellow's note. I conducted my own interview and physical examination. I agree with the findings. I was present and supervising all critical portions of the procedure.

## 2023-07-24 NOTE — PROGRESS NOTES
HISTORY OF PRESENT ILLNESS:    Alivia Thomas is a 57 y.o. female, , Patient's last menstrual period was 2018.,  presents for a routine exam and has no complaints. She denies any PMB.     History of abnormal pap: No  Last Pap: , HPV -   Last MM/22  Last Colonoscopy:     She does not desire STD screening.    The patient participates in regular exercise: yes.    The patient does not smoke.    The patient wears seatbelts.     Pt denies any domestic violence.    Past Medical History:   Diagnosis Date    Allergy     Anemia     Asthma     Bilateral shoulder bursitis     Cervical stenosis of spine     Chronic pain     DDD (degenerative disc disease), cervical     Depression 2019    Diabetes mellitus type II     DJD (degenerative joint disease) of knee 2014    Facet arthritis of lumbar region 2015    Facet syndrome 2015    GERD (gastroesophageal reflux disease)     Heartburn     Hyperlipidemia     Hypertension     Morbid obesity     Neuromuscular disorder     PADDY (obstructive sleep apnea)     Proteinuria     Right carpal tunnel syndrome     Sacroiliitis 2018    Sacroiliitis     Sleep apnea     Uterine fibroid        Past Surgical History:   Procedure Laterality Date    CARPAL TUNNEL RELEASE      CARPAL TUNNEL RELEASE  1980s    left    CARPAL TUNNEL RELEASE  2012    right    COLONOSCOPY N/A 6/15/2020    Procedure: COLONOSCOPY;  Surgeon: Jc Koo MD;  Location: [facility] (4TH FLR);  Service: Endoscopy;  Laterality: N/A;  COVID screening on 20 at [facility]  pt updated on drop off location and no visitor policy-    ESOPHAGOGASTRODUODENOSCOPY N/A 3/27/2019    Procedure: EGD (ESOPHAGOGASTRODUODENOSCOPY);  Surgeon: Robbin Bar MD;  Location: [facility] (2ND FLR);  Service: Endoscopy;  Laterality: N/A;  BMI 61.3/2nd floor case/svn    INJECTION OF JOINT Bilateral 2020    Procedure: INJECTION, JOINT, BILATERAL SI;  Surgeon: Lina Wagner MD;   Location: [facility] PAIN MGT;  Service: Pain Management;  Laterality: Bilateral;  B/L SI Joint Injection    INJECTION OF JOINT Bilateral 5/11/2021    Procedure: INJECTION, JOINT, SACROILIAC (SI) need consent;  Surgeon: Lina Wagner MD;  Location: [facility] PAIN MGT;  Service: Pain Management;  Laterality: Bilateral;    JOINT REPLACEMENT Bilateral     with 2 revisions on rt    KNEE SURGERY  3/2010    orthroscope    KNEE SURGERY  6-19-14    left TKR    LAPAROSCOPIC SLEEVE GASTRECTOMY N/A 8/28/2019    Procedure: GASTRECTOMY, SLEEVE, LAPAROSCOPIC with intraop EGD;  Surgeon: Heriberto Clements MD;  Location: [facility] OR 2ND FLR;  Service: General;  Laterality: N/A;    PANNICULECTOMY N/A 6/14/2022    Procedure: PANNICULECTOMY;  Surgeon: Rhett Gray MD;  [facility];  Service: Plastics;  Laterality: N/A;    RADIOFREQUENCY ABLATION Right 7/9/2019    Procedure: RADIOFREQUENCY ABLATION;  Surgeon: Lina Wagner MD;  Location: [facility] PAIN MGT;  Service: Pain Management;  Laterality: Right;  RIGHT RFA L2,3,4,5  2 of 2    RADIOFREQUENCY ABLATION Left 12/19/2019    Procedure: RADIOFREQUENCY ABLATION, LEFT L2-L3-L4-L5 MEDIAL BRANCH 1 OF 2;  Surgeon: Lina Wagner MD;  Location: [facility] PAIN MGT;  Service: Pain Management;  Laterality: Left;    RADIOFREQUENCY ABLATION Right 12/31/2019    Procedure: RADIOFREQUENCY ABLATION, RIGHT L2-L3-L4-L5  2 OF 2;  Surgeon: Lina Wagner MD;  Location: [facility] PAIN MGT;  Service: Pain Management;  Laterality: Right;    RADIOFREQUENCY ABLATION Right 10/13/2020    Procedure: RADIOFREQUENCY ABLATION, Genicular Cooled 1 of 2;  Surgeon: Lina Wagner MD;  Location: [facility] PAIN MGT;  Service: Pain Management;  Laterality: Right;    RADIOFREQUENCY ABLATION Left 11/3/2020    Procedure: RADIOFREQUENCY ABLATION, GENICULAR COOLED  2 OF 2;  Surgeon: Lina Wagner MD;  Location: [facility] PAIN MGT;  Service: Pain Management;  Laterality: Left;    RADIOFREQUENCY ABLATION Left  12/15/2020    Procedure: RADIOFREQUENCY ABLATION LEFT L2,3,4,5 1 of 2;  Surgeon: Lina Wagner MD;  Location: [facility] PAIN MGT;  Service: Pain Management;  Laterality: Left;    RADIOFREQUENCY ABLATION Right 12/29/2020    Procedure: RADIOFREQUENCY ABLATION RIGHT L2,3,4,5 2 of 2;  Surgeon: Lina Wagner MD;  Location: [facility] PAIN MGT;  Service: Pain Management;  Laterality: Right;    RADIOFREQUENCY ABLATION Left 6/29/2021    Procedure: RADIOFREQUENCY ABLATION GENICULAR COOLED;  Surgeon: Lina Wagner MD;  Location: [facility] PAIN MGT;  Service: Pain Management;  Laterality: Left;  1 of 2    RADIOFREQUENCY ABLATION Right 7/13/2021    Procedure: RADIOFREQUENCY ABLATION GENICULAR;  Surgeon: Lina Wagner MD;  Location: [facility] PAIN MGT;  Service: Pain Management;  Laterality: Right;  2 of 2    RADIOFREQUENCY ABLATION Right 8/24/2021    Procedure: RADIOFREQUENCY ABLATION, L2-L3-L4-L5 MEDIAL BRANCH 1 OF 2;  Surgeon: Lina Wagner MD;  Location: [facility] PAIN MGT;  Service: Pain Management;  Laterality: Right;    RADIOFREQUENCY ABLATION Left 9/11/2021    Procedure: RADIOFREQUENCY ABLATION, L2-L3-L4-L5 MEDIAL BR ANCH 2 OF 2;  Surgeon: Lina Wagner MD;  Location: [facility] PAIN MGT;  Service: Pain Management;  Laterality: Left;    RADIOFREQUENCY ABLATION Right 3/7/2022    Procedure: RADIOFREQUENCY ABLATION RIGHT L3,4,5 1 of 2, needs consent;  Surgeon: Israel Hurtado MD;  Location: [facility] PAIN MGT;  Service: Pain Management;  Laterality: Right;    RADIOFREQUENCY ABLATION Left 3/21/2022    Procedure: RADIOFREQUENCY ABLATION RIGHT L3,4,5 2 of 2, needs consent;  Surgeon: Israel Hurtado MD;  Location: [facility] PAIN MGT;  Service: Pain Management;  Laterality: Left;    RADIOFREQUENCY ABLATION Right 5/9/2022    Procedure: RADIOFREQUENCY ABLATION RIGHT GENICULAR COOLED 1 of 2;  Surgeon: Israel Hurtado MD;  Location: [facility] PAIN MGT;  Service: Pain Management;  Laterality: Right;    RADIOFREQUENCY ABLATION Left 5/23/2022    Procedure:  RADIOFREQUENCY ABLATION LEFT GENICULAR COOLED  2 of 2;  Surgeon: Israel Hurtado MD;  Location: [facility] [place] PAIN MGT;  Service: Pain Management;  Laterality: Left;    RADIOFREQUENCY ABLATION Right 12/12/2022    Procedure: RADIOFREQUENCY ABLATION RIGHT GENICULAR COOLED  ONE OF TWO  NEEDS CONSENT;  Surgeon: Israel Hurtado MD;  Location: [facility] [place] PAIN MGT;  Service: Pain Management;  Laterality: Right;    RADIOFREQUENCY ABLATION Left 1/9/2023    Procedure: RADIOFREQUENCY ABLATION LEFT GENICULAR COOLED  TWO OF TWO NEEDS CONSENT;  Surgeon: Israel Hurtado MD;  Location: [facility] [place] PAIN MGT;  Service: Pain Management;  Laterality: Left;    RADIOFREQUENCY ABLATION Right 3/6/2023    Procedure: RADIOFREQUENCY ABLATION RIGHT L3,L4,L5;  Surgeon: Israel Hurtado MD;  Location: [facility] [place] PAIN MGT;  Service: Pain Management;  Laterality: Right;    RADIOFREQUENCY ABLATION Left 5/22/2023    Procedure: RADIOFREQUENCY ABLATION LEFT L3,L4,L5;  Surgeon: Israel Hurtado MD;  Location: [facility] [place] PAIN MGT;  Service: Pain Management;  Laterality: Left;  4/3 LM TO R/S    RADIOFREQUENCY ABLATION OF LUMBAR MEDIAL BRANCH NERVE AT SINGLE LEVEL Left 6/26/2018    Procedure: RADIOFREQUENCY ABLATION, NERVE, MEDIAL BRANCH, LUMBAR, 1 LEVEL;  Surgeon: Lina Wagner MD;  Location: [facility] [place] PAIN MGT;  Service: Pain Management;  Laterality: Left;  Left Cooled RFA @ L2,3,4,5  31499-[address]7  with Sedation    1 of 2    RADIOFREQUENCY ABLATION OF LUMBAR MEDIAL BRANCH NERVE AT SINGLE LEVEL Right 7/10/2018    Procedure: RADIOFREQUENCY ABLATION, NERVE, MEDIAL BRANCH, LUMBAR, 1 LEVEL;  Surgeon: Lina Wagner MD;  Location: [facility] [place] [facility]IN MGT;  Service: Pain Management;  Laterality: Right;  RIght Cooled RFA @ L2,3,4,5  [id number] with Sedation    2 of 2    TRIGGER FINGER RELEASE Right 2017    TRIGGER FINGER RELEASE Left 7/29/2019    Procedure: RELEASE, TRIGGER FINGER, Left Thumb;  Surgeon: Velma Garcia MD;  Location: [facility] [place] OR;  Service: Orthopedics;  Laterality: Left;  Local w/ MAC        MEDICATIONS AND ALLERGIES:      Current Outpatient Medications:     albuterol (VENTOLIN HFA) 90 mcg/actuation inhaler, INHALE TWO PUFFS INTO LUNGS EVERY 4 TO 6 HOURS AS NEEDED FOR SHORTNESS OF BREATH AND FOR WHEEZING, Disp: 18 g, Rfl: 1    allopurinoL (ZYLOPRIM) 300 MG tablet, Take 1 tablet (300 mg total) by mouth 2 (two) times daily., Disp: 180 tablet, Rfl: 3    atorvastatin (LIPITOR) 20 MG tablet, Take 1 tablet (20 mg total) by mouth once daily., Disp: 90 tablet, Rfl: 3    b complex vitamins tablet, Take 1 tablet by mouth every other day. , Disp: , Rfl:     calcium citrate/vitamin D2 (ISIAH-CITRATE ORAL), Take 500 mg by mouth 2 (two) times daily., Disp: , Rfl:     colchicine (MITIGARE) 0.6 mg Cap, Take 1 capsule (0.6 mg total) by mouth daily as needed (flare up)., Disp: 90 capsule, Rfl: 3    cyanocobalamin (VITAMIN B-12) 1000 MCG tablet, Take 100 mcg by mouth every morning., Disp: , Rfl:     diclofenac sodium (VOLTAREN) 1 % Gel, Apply 2 g topically 4 (four) times daily as needed. To painful area on the feet., Disp: 500 g, Rfl: 5    fluconazole (DIFLUCAN) 150 MG Tab, Take 1 tablet (150 mg total) by mouth Every 3 (three) days., Disp: 3 tablet, Rfl: 0    FLUoxetine (PROZAC) 20 mg/5 mL (4 mg/mL) solution, TAKE 5 MLS BY MOUTH ONCE DAILY., Disp: 450 mL, Rfl: 3    fluticasone propionate (FLONASE) 50 mcg/actuation nasal spray, 1 SPRAY BY EACH NOSTRIL ROUTE 2 TIMES DAILY AS NEEDED FOR RHINITIS., Disp: 48 g, Rfl: 3    indomethacin (INDOCIN) 50 MG capsule, TAKE 1 CAPSULE BY MOUTH 2 TIMES DAILY WITH MEALS (Patient taking differently: Take 50 mg by mouth as needed.), Disp: 30 capsule, Rfl: 2    [START ON 8/5/2023] ketorolac 0.5% (ACULAR) 0.5 % Drop, Place 1 drop into the left eye 4 (four) times daily., Disp: 5 mL, Rfl: 1    LIDOcaine (XYLOCAINE) 5 % Oint ointment, Apply topically as needed., Disp: 35.44 g, Rfl: 6    MULTIVIT-IRON-MIN-FOLIC ACID 3,500-18-0.4 UNIT-MG-MG ORAL CHEW, Take 1 tablet by mouth 2 (two) times daily.  , Disp: , Rfl:     nystatin (MYCOSTATIN) powder, Apply topically 2 (two) times daily., Disp: 60 g, Rfl: 3    [START ON 8/5/2023] ofloxacin (OCUFLOX) 0.3 % ophthalmic solution, Place 1 drop into the left eye 4 (four) times daily. for 10 days, Disp: 5 mL, Rfl: 1    omeprazole (PRILOSEC) 20 MG capsule, Take 1 capsule (20 mg total) by mouth every morning., Disp: 90 capsule, Rfl: 3    oxybutynin (DITROPAN-XL) 5 MG TR24, TAKE 1 TABLET BY MOUTH ONCE DAILY., Disp: 90 tablet, Rfl: 3    oxyCODONE-acetaminophen (PERCOCET)  mg per tablet, Take 1 tablet by mouth every 8 (eight) hours as needed for Pain., Disp: 90 tablet, Rfl: 0    [START ON 8/8/2023] prednisoLONE acetate (PRED FORTE) 1 % DrpS, Place 1 drop into the left eye 4 (four) times daily., Disp: 5 mL, Rfl: 1    tiZANidine (ZANAFLEX) 4 MG tablet, Take 1 tablet (4 mg total) by mouth every 8 (eight) hours as needed (muscle pain)., Disp: 90 tablet, Rfl: 2    urea (CARMOL) 40 % Crea, Apply topically once daily. To dry skin on the feet., Disp: 120 g, Rfl: 5    vitamin D 185 MG Tab, Take 5,000 mg by mouth every morning. , Disp: , Rfl:   No current facility-administered medications for this visit.    Facility-Administered Medications Ordered in Other Visits:     0.9%  NaCl infusion, , Intravenous, Continuous, Lina Wagner MD, Last Rate: 25 mL/hr at 12/15/20 1506, 25 mL/hr at 12/15/20 1506    Review of patient's allergies indicates:   Allergen Reactions    Strawberry Anaphylaxis       Family History   Problem Relation Age of Onset    Diabetes Mother     Cataracts Mother     Diabetes Father     Cataracts Father     Hypertension Sister     Diabetes Sister     No Known Problems Sister     Cancer Sister         lymphoma     Coronary artery disease Brother     Diabetes Brother     Diabetes Brother     Hypotension Brother     Kidney failure Brother     Hypotension Brother     Amblyopia Neg Hx     Blindness Neg Hx     Glaucoma Neg Hx     Macular degeneration Neg Hx      Retinal detachment Neg Hx     Strabismus Neg Hx     Stroke Neg Hx     Thyroid disease Neg Hx     Anesthesia problems Neg Hx        Social History     Socioeconomic History    Marital status:    Tobacco Use    Smoking status: Never    Smokeless tobacco: Never   Substance and Sexual Activity    Alcohol use: Not Currently     Comment: occasionally     Drug use: No    Sexual activity: Yes     Partners: Female     Birth control/protection: None   Social History Narrative    Disabled. The patient is the youngest of 6 siblings. Single. Lives with single-sex partner.                  Social Determinants of Health     Financial Resource Strain: Unknown    Difficulty of Paying Living Expenses: Patient refused   Food Insecurity: No Food Insecurity    Worried About Running Out of Food in the Last Year: Never true    Ran Out of Food in the Last Year: Never true   Transportation Needs: No Transportation Needs    Lack of Transportation (Medical): No    Lack of Transportation (Non-Medical): No   Physical Activity: Unknown    Days of Exercise per Week: Patient refused    Minutes of Exercise per Session: Patient refused   Stress: Stress Concern Present    Feeling of Stress : Very much   Social Connections: Unknown    Frequency of Communication with Friends and Family: Twice a week    Frequency of Social Gatherings with Friends and Family: Never    Attends Christian Services: More than 4 times per year    Active Member of Clubs or Organizations: No    Attends Club or Organization Meetings: Never    Marital Status: Patient refused   Housing Stability: Unknown    Unable to Pay for Housing in the Last Year: Patient refused    Unstable Housing in the Last Year: Patient refused       COMPREHENSIVE GYN HISTORY:  PAP History: Denies abnormal Paps.  Infection History: Denies STDs. Denies PID.  Benign History: Denies uterine fibroids. Denies ovarian cysts. Denies endometriosis. Denies other conditions.  Cancer History: Denies cervical  cancer. Denies uterine cancer or hyperplasia. Denies ovarian cancer. Denies vulvar cancer or pre-cancer. Denies vaginal cancer or pre-cancer. Denies breast cancer. Denies colon cancer.  Sexual Activity History: Reports currently being sexually active  Menstrual History: Monthly. Mod then light flow.   Dysmenorrhea History: Reports mild dysmenorrhea.     ROS:  GENERAL: No weight changes. No swelling. No fatigue. No fever.  CARDIOVASCULAR: No chest pain. No shortness of breath. No leg cramps.   NEUROLOGICAL: No headaches. No vision changes.  BREASTS: No pain. No lumps. No discharge.  ABDOMEN: No pain. No nausea. No vomiting. No diarrhea. No constipation.  REPRODUCTIVE: No abnormal bleeding.   VULVA: No pain. No lesions. No itching.  VAGINA: No relaxation. No itching. No odor. No discharge. No lesions.  URINARY: No incontinence. No nocturia. No frequency. No dysuria.    BP (!) 140/86   Wt 110.9 kg (244 lb 6.1 oz)   LMP 06/26/2018   BMI 40.67 kg/m²     PE:  APPEARANCE: Well nourished, well developed, in no acute distress.  AFFECT: WNL, alert and oriented x 3.  SKIN: No acne or hirsutism.  NECK: Neck symmetric, without masses or thyromegaly.  NODES: No inguinal, cervical, axillary or femoral lymph node enlargement.  CHEST: Good respiratory effort.   ABDOMEN: Soft. No tenderness or masses. No hepatosplenomegaly. No hernias.  BREASTS: Symmetrical, no skin changes, visible lesions, palpable masses or nipple discharge bilaterally.  PELVIC: External female genitalia without lesions.  Female hair distribution. Adequate perineal body, Normal urethral meatus. Vagina moist and well rugated without lesions or discharge.  No significant cystocele or rectocele present. Cervix pink without lesions, discharge or tenderness. Uterus feels normal in size, mobile and nontender. Adnexa no masses or tenderness appreciated.   Exam limited by body habitus, patient made aware and voices understanding.  EXTREMITIES: No  edema    DIAGNOSIS:  1. Encounter for gynecological examination without abnormal finding    2. Visit for screening mammogram    3. Depression, unspecified depression type    4. Mild intermittent asthma without complication    5. Mixed hyperlipidemia    6. Type 2 diabetes mellitus without complication, without long-term current use of insulin    7. History of sleeve gastrectomy    8. Snoring    9. Family history of cancer    10. Uterine leiomyoma, unspecified location      PLAN:    Orders Placed This Encounter    Mammo Digital Screening Bilat w/ [facility]    WOMEN'S GENETICS REQUEST       COUNSELING:  The patient was counseled today on:  -A.C.S. Pap and pelvic exam guidelines (pap every 3 years), recomendations for yearly mammogram;  -to follow up with her PCP for other health maintenance.    FOLLOW-UP with me annually.

## 2023-07-24 NOTE — DISCHARGE INSTRUCTIONS

## 2023-07-24 NOTE — DISCHARGE SUMMARY
Discharge Note  Short Stay      SUMMARY     Admit Date: 7/24/2023    Attending Physician: Israel Hurtado MD      Discharge Physician: Ciro Chung      Discharge Date: 7/24/2023 11:42 AM    Procedure(s) (LRB):  INJECTION, STEROID, EPIDURAL, L5/S1 SOONER DATE (N/A)    Final Diagnosis: Spinal stenosis of lumbar region, unspecified whether neurogenic claudication present [M48.061]    Disposition: Home or self care    Patient Instructions:   Current Discharge Medication List        CONTINUE these medications which have NOT CHANGED    Details   albuterol (VENTOLIN HFA) 90 mcg/actuation inhaler INHALE TWO PUFFS INTO LUNGS EVERY 4 TO 6 HOURS AS NEEDED FOR SHORTNESS OF BREATH AND FOR WHEEZING  Qty: 18 g, Refills: 1    Comments: Please consider 90 day supplies to promote better adherence  Associated Diagnoses: Asthma, well controlled, mild intermittent      allopurinoL (ZYLOPRIM) 300 MG tablet Take 1 tablet (300 mg total) by mouth 2 (two) times daily.  Qty: 180 tablet, Refills: 3    Associated Diagnoses: Gout, unspecified cause, unspecified chronicity, unspecified site      atorvastatin (LIPITOR) 20 MG tablet Take 1 tablet (20 mg total) by mouth once daily.  Qty: 90 tablet, Refills: 3      b complex vitamins tablet Take 1 tablet by mouth every other day.       calcium citrate/vitamin D2 (ISIAH-CITRATE ORAL) Take 500 mg by mouth 2 (two) times daily.      colchicine (MITIGARE) 0.6 mg Cap Take 1 capsule (0.6 mg total) by mouth daily as needed (flare up).  Qty: 90 capsule, Refills: 3    Associated Diagnoses: Gout, unspecified cause, unspecified chronicity, unspecified site      cyanocobalamin (VITAMIN B-12) 1000 MCG tablet Take 100 mcg by mouth every morning.      diclofenac sodium (VOLTAREN) 1 % Gel Apply 2 g topically 4 (four) times daily as needed. To painful area on the feet.  Qty: 500 g, Refills: 5    Comments: 5 x 100g tubes  Associated Diagnoses: Right foot pain; Enthesopathy of foot      fluconazole (DIFLUCAN) 150 MG Tab  Take 1 tablet (150 mg total) by mouth Every 3 (three) days.  Qty: 3 tablet, Refills: 0      FLUoxetine (PROZAC) 20 mg/5 mL (4 mg/mL) solution TAKE 5 MLS BY MOUTH ONCE DAILY.  Qty: 450 mL, Refills: 3      fluticasone propionate (FLONASE) 50 mcg/actuation nasal spray 1 SPRAY BY EACH NOSTRIL ROUTE 2 TIMES DAILY AS NEEDED FOR RHINITIS.  Qty: 48 g, Refills: 3      indomethacin (INDOCIN) 50 MG capsule TAKE 1 CAPSULE BY MOUTH 2 TIMES DAILY WITH MEALS  Qty: 30 capsule, Refills: 2    Associated Diagnoses: Right foot pain; Enthesopathy of foot      ketorolac 0.5% (ACULAR) 0.5 % Drop Place 1 drop into the left eye 4 (four) times daily.  Qty: 5 mL, Refills: 1    Associated Diagnoses: Nuclear sclerotic cataract of both eyes      LIDOcaine (XYLOCAINE) 5 % Oint ointment Apply topically as needed.  Qty: 35.44 g, Refills: 6    Associated Diagnoses: Right foot pain      MULTIVIT-IRON-MIN-FOLIC ACID 3,500-18-0.4 UNIT-MG-MG ORAL CHEW Take 1 tablet by mouth 2 (two) times daily.       nystatin (MYCOSTATIN) powder Apply topically 2 (two) times daily.  Qty: 60 g, Refills: 3      ofloxacin (OCUFLOX) 0.3 % ophthalmic solution Place 1 drop into the left eye 4 (four) times daily. for 10 days  Qty: 5 mL, Refills: 1    Associated Diagnoses: Nuclear sclerotic cataract of both eyes      omeprazole (PRILOSEC) 20 MG capsule Take 1 capsule (20 mg total) by mouth every morning.  Qty: 90 capsule, Refills: 3    Associated Diagnoses: Gastroesophageal reflux disease without esophagitis      oxybutynin (DITROPAN-XL) 5 MG TR24 TAKE 1 TABLET BY MOUTH ONCE DAILY.  Qty: 90 tablet, Refills: 3    Associated Diagnoses: Mixed incontinence urge and stress (male)(female)      oxyCODONE-acetaminophen (PERCOCET)  mg per tablet Take 1 tablet by mouth every 8 (eight) hours as needed for Pain.  Qty: 90 tablet, Refills: 0    Comments: Quantity prescribed more than 7 day supply? Yes, quantity medically necessary  Associated Diagnoses: Lumbar spondylosis; Chronic pain  syndrome      prednisoLONE acetate (PRED FORTE) 1 % DrpS Place 1 drop into the left eye 4 (four) times daily.  Qty: 5 mL, Refills: 1    Associated Diagnoses: Nuclear sclerotic cataract of both eyes      tiZANidine (ZANAFLEX) 4 MG tablet Take 1 tablet (4 mg total) by mouth every 8 (eight) hours as needed (muscle pain).  Qty: 90 tablet, Refills: 2      urea (CARMOL) 40 % Crea Apply topically once daily. To dry skin on the feet.  Qty: 120 g, Refills: 5    Associated Diagnoses: Dry skin      vitamin D 185 MG Tab Take 5,000 mg by mouth every morning.                  Discharge Diagnosis: Spinal stenosis of lumbar region, unspecified whether neurogenic claudication present [M48.061]  Condition on Discharge: Stable with no complications to procedure   Diet on Discharge: Same as before.  Activity: as per instruction sheet.  Discharge to: Home with a responsible adult.  Follow up: 2-4 weeks       Please call my office or pager at [phone] if experienced any weakness or loss of sensation, fever > 101.5, pain uncontrolled with oral medications, persistent nausea/vomiting/or diarrhea, redness or drainage from the incisions, or any other worrisome concerns. If physician on call was not reached or could not communicate with our office for any reason please go to the nearest emergency department

## 2023-07-26 ENCOUNTER — HOSPITAL ENCOUNTER (OUTPATIENT)
Dept: RADIOLOGY | Facility: HOSPITAL | Age: 58
Discharge: HOME | End: 2023-07-26
Attending: ORTHOPAEDIC SURGERY
Payer: MEDICARE

## 2023-07-26 ENCOUNTER — OFFICE VISIT (OUTPATIENT)
Dept: ORTHOPEDICS | Facility: CLINIC | Age: 58
End: 2023-07-26
Payer: MEDICARE

## 2023-07-26 VITALS — HEIGHT: 65 IN | BODY MASS INDEX: 40.31 KG/M2 | WEIGHT: 241.94 LBS

## 2023-07-26 DIAGNOSIS — Z96.653 PRESENCE OF BOTH ARTIFICIAL KNEE JOINTS: ICD-10-CM

## 2023-07-26 DIAGNOSIS — M25.561 CHRONIC PAIN OF BOTH KNEES: Primary | ICD-10-CM

## 2023-07-26 DIAGNOSIS — M25.562 PAIN IN BOTH KNEES, UNSPECIFIED CHRONICITY: ICD-10-CM

## 2023-07-26 DIAGNOSIS — M25.561 PAIN IN BOTH KNEES, UNSPECIFIED CHRONICITY: ICD-10-CM

## 2023-07-26 DIAGNOSIS — Z96.651 HISTORY OF REVISION OF TOTAL REPLACEMENT OF RIGHT KNEE JOINT: ICD-10-CM

## 2023-07-26 DIAGNOSIS — M25.562 CHRONIC PAIN OF BOTH KNEES: Primary | ICD-10-CM

## 2023-07-26 DIAGNOSIS — G89.29 CHRONIC PAIN OF BOTH KNEES: Primary | ICD-10-CM

## 2023-07-26 PROCEDURE — 99214 OFFICE O/P EST MOD 30 MIN: CPT | Mod: PBBFAC | Performed by: ORTHOPAEDIC SURGERY

## 2023-07-26 PROCEDURE — 73562 X-RAY EXAM OF KNEE 3: CPT | Mod: 26,50,, | Performed by: RADIOLOGY

## 2023-07-26 PROCEDURE — 99999 PR PBB SHADOW E&M-EST. PATIENT-LVL IV: CPT | Mod: PBBFAC,,, | Performed by: ORTHOPAEDIC SURGERY

## 2023-07-26 PROCEDURE — 99204 OFFICE O/P NEW MOD 45 MIN: CPT | Mod: S$PBB,,, | Performed by: ORTHOPAEDIC SURGERY

## 2023-07-26 PROCEDURE — 73562 X-RAY EXAM OF KNEE 3: CPT | Mod: TC,50

## 2023-07-26 PROCEDURE — 73562 XR KNEE ORTHO BILAT: ICD-10-PCS | Mod: 26,50,, | Performed by: RADIOLOGY

## 2023-07-26 PROCEDURE — 99204 PR OFFICE/OUTPT VISIT, NEW, LEVL IV, 45-59 MIN: ICD-10-PCS | Mod: S$PBB,,, | Performed by: ORTHOPAEDIC SURGERY

## 2023-07-26 PROCEDURE — 99999 PR PBB SHADOW E&M-EST. PATIENT-LVL IV: ICD-10-PCS | Mod: PBBFAC,,, | Performed by: ORTHOPAEDIC SURGERY

## 2023-07-26 NOTE — PROGRESS NOTES
Subjective:      Patient ID: Alivia Thomas is a 57 y.o. female.    Chief Complaint: Pain of the Left Knee and Pain of the Right Knee    HPI  I would seen Ms. Seen see her for a while.  She is well known to me.  She had a right total knee replacement in 2010 and a tibial revision in 2014.  She would a left total knee replacement in 2014.  The knees are [employer].  She is had chronic pain in both knees for years.  It is not gotten better it is not gotten worse.  She is had bariatric surgery in his lost a great deal of weight.  (approximally 150 lb).  She thought losing the weight would help her knees but it has not.  She is here to get them re-evaluated.  She is aware of the [employer] recall.  No recent falls trauma or infection.  Past Medical History:   Diagnosis Date    Allergy     Anemia     Asthma     Bilateral shoulder bursitis     Cervical stenosis of spine     Chronic pain     DDD (degenerative disc disease), cervical     Depression 01/28/2019    Diabetes mellitus type II     DJD (degenerative joint disease) of knee 06/19/2014    Facet arthritis of lumbar region 12/17/2015    Facet syndrome 12/17/2015    GERD (gastroesophageal reflux disease)     Heartburn     Hyperlipidemia     Hypertension     Morbid obesity     Neuromuscular disorder     PADDY (obstructive sleep apnea)     Proteinuria     Right carpal tunnel syndrome     Sacroiliitis 06/13/2018    Sacroiliitis     Sleep apnea     Uterine fibroid      Past Surgical History:   Procedure Laterality Date    CARPAL TUNNEL RELEASE      CARPAL TUNNEL RELEASE  1980s    left    CARPAL TUNNEL RELEASE  2012    right    COLONOSCOPY N/A 6/15/2020    Procedure: COLONOSCOPY;  Surgeon: Jc Koo MD;  Location: [address];  Service: Endoscopy;  Laterality: N/A;  COVID screening on 6/13/20 at [facility]  pt updated on drop off location and no visitor policy-    EPIDURAL STEROID INJECTION N/A 7/24/2023    Procedure: INJECTION, STEROID, EPIDURAL, L5/S1 SOONER  DATE;  Surgeon: Israel Hurtado MD;  Location: [facility];  Service: Pain Management;  Laterality: N/A;    ESOPHAGOGASTRODUODENOSCOPY N/A 3/27/2019    Procedure: EGD (ESOPHAGOGASTRODUODENOSCOPY);  Surgeon: Robbin Bar MD;  Location: [facility] (2ND FLR);  Service: Endoscopy;  Laterality: N/A;  BMI 61.3/2nd floor case/svn    INJECTION OF JOINT Bilateral 6/9/2020    Procedure: INJECTION, JOINT, BILATERAL SI;  Surgeon: Lina Wagner MD;  Location: [facility];  Service: Pain Management;  Laterality: Bilateral;  B/L SI Joint Injection    INJECTION OF JOINT Bilateral 5/11/2021    Procedure: INJECTION, JOINT, SACROILIAC (SI) need consent;  Surgeon: Lina Wagner MD;  Location: [facility];  Service: Pain Management;  Laterality: Bilateral;    JOINT REPLACEMENT Bilateral     with 2 revisions on rt    KNEE SURGERY  3/2010    orthroscope    KNEE SURGERY  6-19-14    left TKR    LAPAROSCOPIC SLEEVE GASTRECTOMY N/A 8/28/2019    Procedure: GASTRECTOMY, SLEEVE, LAPAROSCOPIC with intraop EGD;  Surgeon: Heriberto Clements MD;  Location: [facility] OR 2ND FLR;  Service: General;  Laterality: N/A;    PANNICULECTOMY N/A 6/14/2022    Procedure: PANNICULECTOMY;  Surgeon: Rhett Gray MD;  Location: [facility] OR 2ND FLR;  Service: Plastics;  Laterality: N/A;    RADIOFREQUENCY ABLATION Right 7/9/2019    Procedure: RADIOFREQUENCY ABLATION;  Surgeon: Lina Wagner MD;  Location: [facility];  Service: Pain Management;  Laterality: Right;  RIGHT RFA L2,3,4,5  2 of 2    RADIOFREQUENCY ABLATION Left 12/19/2019    Procedure: RADIOFREQUENCY ABLATION, LEFT L2-L3-L4-L5 MEDIAL BRANCH 1 OF 2;  Surgeon: Lina Wagner MD;  Location: [facility] [place] PAIN T;  Service: Pain Management;  Laterality: Left;    RADIOFREQUENCY ABLATION Right 12/31/2019    Procedure: RADIOFREQUENCY ABLATION, RIGHT L2-L3-L4-L5  2 OF 2;  Surgeon: Lina Wagner MD;  Location: [facility] [place] PAIN MGT;  Service: Pain Management;  Laterality: Right;     RADIOFREQUENCY ABLATION Right 10/13/2020    Procedure: RADIOFREQUENCY ABLATION, Genicular Cooled 1 of 2;  Surgeon: Lina Wagner MD;  Location: [facility];  Service: Pain Management;  Laterality: Right;    RADIOFREQUENCY ABLATION Left 11/3/2020    Procedure: RADIOFREQUENCY ABLATION, GENICULAR COOLED  2 OF 2;  Surgeon: Lina Wagner MD;  Location: [facility] PAIN MGT;  Service: Pain Management;  Laterality: Left;    RADIOFREQUENCY ABLATION Left 12/15/2020    Procedure: RADIOFREQUENCY ABLATION LEFT L2,3,4,5 1 of 2;  Surgeon: Lina Wagner MD;  Location: [facility];  Service: Pain Management;  Laterality: Left;    RADIOFREQUENCY ABLATION Right 12/29/2020    Procedure: RADIOFREQUENCY ABLATION RIGHT L2,3,4,5 2 of 2;  Surgeon: Lina Wagner MD;  Location: [facility] PAIN MGT;  Service: Pain Management;  Laterality: Right;    RADIOFREQUENCY ABLATION Left 6/29/2021    Procedure: RADIOFREQUENCY ABLATION GENICULAR COOLED;  Surgeon: Lina Wagner MD;  Location: [facility] PAIN MGT;  Service: Pain Management;  Laterality: Left;  1 of 2    RADIOFREQUENCY ABLATION Right 7/13/2021    Procedure: RADIOFREQUENCY ABLATION GENICULAR;  Surgeon: Lina Wagner MD;  Location: [facility] PAIN MGT;  Service: Pain Management;  Laterality: Right;  2 of 2    RADIOFREQUENCY ABLATION Right 8/24/2021    Procedure: RADIOFREQUENCY ABLATION, L2-L3-L4-L5 MEDIAL BRANCH 1 OF 2;  Surgeon: Lina Wagner MD;  Location: [facility] PAIN MGT;  Service: Pain Management;  Laterality: Right;    RADIOFREQUENCY ABLATION Left 9/11/2021    Procedure: RADIOFREQUENCY ABLATION, L2-L3-L4-L5 MEDIAL BR ANCH 2 OF 2;  Surgeon: Lina Wagner MD;  Location: [facility] PAIN MGT;  Service: Pain Management;  Laterality: Left;    RADIOFREQUENCY ABLATION Right 3/7/2022    Procedure: RADIOFREQUENCY ABLATION RIGHT L3,4,5 1 of 2, needs consent;  Surgeon: Israel Hurtado MD;  Location: [facility] PAIN MGT;  Service: Pain Management;  Laterality: Right;    RADIOFREQUENCY ABLATION Left  3/21/2022    Procedure: RADIOFREQUENCY ABLATION RIGHT L3,4,5 2 of 2, needs consent;  Surgeon: Israel Hurtado MD;  Location: [facility];  Service: Pain Management;  Laterality: Left;    RADIOFREQUENCY ABLATION Right 5/9/2022    Procedure: RADIOFREQUENCY ABLATION RIGHT GENICULAR COOLED 1 of 2;  Surgeon: Israel Hurtado MD;  Location: [facility] PAIN [facility]GT;  Service: Pain Management;  Laterality: Right;    RADIOFREQUENCY ABLATION Left 5/23/2022    Procedure: RADIOFREQUENCY ABLATION LEFT GENICULAR COOLED  2 of 2;  Surgeon: Israel Hurtado MD;  Location: [facility]IN [facility];  Service: Pain Management;  Laterality: Left;    RADIOFREQUENCY ABLATION Right 12/12/2022    Procedure: RADIOFREQUENCY ABLATION RIGHT GENICULAR COOLED  ONE OF TWO  NEEDS CONSENT;  Surgeon: Israel Hurtado MD;  Location: [facility];  Service: Pain Management;  Laterality: Right;    RADIOFREQUENCY ABLATION Left 1/9/2023    Procedure: RADIOFREQUENCY ABLATION LEFT GENICULAR COOLED  TWO OF TWO NEEDS CONSENT;  Surgeon: Israel Hurtado MD;  Location: [facility];  Service: Pain Management;  Laterality: Left;    RADIOFREQUENCY ABLATION Right 3/6/2023    Procedure: RADIOFREQUENCY ABLATION RIGHT L3,L4,L5;  Surgeon: Israel Hurtado MD;  Location: [facility];  Service: Pain Management;  Laterality: Right;    RADIOFREQUENCY ABLATION Left 5/22/2023    Procedure: RADIOFREQUENCY ABLATION LEFT L3,L4,L5;  Surgeon: Israel Hurtado MD;  Location: [facility];  Service: Pain Management;  Laterality: Left;  4/3 LM TO R/S    RADIOFREQUENCY ABLATION OF LUMBAR MEDIAL BRANCH NERVE AT SINGLE LEVEL Left 6/26/2018    Procedure: RADIOFREQUENCY ABLATION, NERVE, MEDIAL BRANCH, LUMBAR, 1 LEVEL;  Surgeon: Lina Wagner MD;  Location: [facility];  Service: Pain Management;  Laterality: Left;  Left Cooled RFA @ L2,3,4,5  [address]10-[address]79  with Sedation    1 of 2    RADIOFREQUENCY ABLATION OF LUMBAR MEDIAL BRANCH NERVE AT SINGLE LEVEL Right 7/10/2018    Procedure: RADIOFREQUENCY ABLATION, NERVE, MEDIAL  BRANCH, LUMBAR, 1 LEVEL;  Surgeon: Lina Wagner MD;  Location: [facility] PAIN MGT;  Service: Pain Management;  Laterality: Right;  RIght Cooled RFA @ L2,3,4,5  57[address]-48286 with Sedation    2 of 2    TRIGGER FINGER RELEASE Right 2017    TRIGGER FINGER RELEASE Left 7/29/2019    Procedure: RELEASE, TRIGGER FINGER, Left Thumb;  Surgeon: Velma Garcia MD;  Location: [facility] OR;  Service: Orthopedics;  Laterality: Left;  Local w/ MAC     Family History   Problem Relation Age of Onset    Diabetes Mother     Cataracts Mother     Diabetes Father     Cataracts Father     Hypertension Sister     Diabetes Sister     No Known Problems Sister     Cancer Sister         lymphoma     Coronary artery disease Brother     Diabetes Brother     Diabetes Brother     Hypotension Brother     Kidney failure Brother     Hypotension Brother     Amblyopia Neg Hx     Blindness Neg Hx     Glaucoma Neg Hx     Macular degeneration Neg Hx     Retinal detachment Neg Hx     Strabismus Neg Hx     Stroke Neg Hx     Thyroid disease Neg Hx     Anesthesia problems Neg Hx      Social History     Socioeconomic History    Marital status:    Tobacco Use    Smoking status: Never    Smokeless tobacco: Never   Substance and Sexual Activity    Alcohol use: Not Currently     Comment: occasionally     Drug use: No    Sexual activity: Yes     Partners: Female     Birth control/protection: None   Social History Narrative    Disabled. The patient is the youngest of 6 siblings. Single. Lives with single-sex partner.                  Social Determinants of Health     Financial Resource Strain: Unknown    Difficulty of Paying Living Expenses: Patient refused   Food Insecurity: No Food Insecurity    Worried About Running Out of Food in the Last Year: Never true    Ran Out of Food in the Last Year: Never true   Transportation Needs: No Transportation Needs    Lack of Transportation (Medical): No    Lack of Transportation (Non-Medical): No   Physical Activity:  Unknown    Days of Exercise per Week: Patient refused    Minutes of Exercise per Session: Patient refused   Stress: Stress Concern Present    Feeling of Stress : Very much   Social Connections: Unknown    Frequency of Communication with Friends and Family: Twice a week    Frequency of Social Gatherings with Friends and Family: Never    Attends Hinduism Services: More than 4 times per year    Active Member of Clubs or Organizations: No    Attends Club or Organization Meetings: Never    Marital Status: Patient refused   Housing Stability: Unknown    Unable to Pay for Housing in the Last Year: Patient refused    Unstable Housing in the Last Year: Patient refused     Current Outpatient Medications on File Prior to Visit   Medication Sig Dispense Refill    albuterol (VENTOLIN HFA) 90 mcg/actuation inhaler INHALE TWO PUFFS INTO LUNGS EVERY 4 TO 6 HOURS AS NEEDED FOR SHORTNESS OF BREATH AND FOR WHEEZING 18 g 1    allopurinoL (ZYLOPRIM) 300 MG tablet Take 1 tablet (300 mg total) by mouth 2 (two) times daily. 180 tablet 3    atorvastatin (LIPITOR) 20 MG tablet Take 1 tablet (20 mg total) by mouth once daily. 90 tablet 3    b complex vitamins tablet Take 1 tablet by mouth every other day.       calcium citrate/vitamin D2 (ISIAH-CITRATE ORAL) Take 500 mg by mouth 2 (two) times daily.      colchicine (MITIGARE) 0.6 mg Cap Take 1 capsule (0.6 mg total) by mouth daily as needed (flare up). 90 capsule 3    cyanocobalamin (VITAMIN B-12) 1000 MCG tablet Take 100 mcg by mouth every morning.      diclofenac sodium (VOLTAREN) 1 % Gel Apply 2 g topically 4 (four) times daily as needed. To painful area on the feet. 500 g 5    fluconazole (DIFLUCAN) 150 MG Tab Take 1 tablet (150 mg total) by mouth Every 3 (three) days. 3 tablet 0    FLUoxetine (PROZAC) 20 mg/5 mL (4 mg/mL) solution TAKE 5 MLS BY MOUTH ONCE DAILY. 450 mL 3    fluticasone propionate (FLONASE) 50 mcg/actuation nasal spray 1 SPRAY BY EACH NOSTRIL ROUTE 2 TIMES DAILY AS NEEDED  FOR RHINITIS. 48 g 3    indomethacin (INDOCIN) 50 MG capsule TAKE 1 CAPSULE BY MOUTH 2 TIMES DAILY WITH MEALS (Patient taking differently: Take 50 mg by mouth as needed.) 30 capsule 2    [START ON 8/5/2023] ketorolac 0.5% (ACULAR) 0.5 % Drop Place 1 drop into the left eye 4 (four) times daily. 5 mL 1    LIDOcaine (XYLOCAINE) 5 % Oint ointment Apply topically as needed. 35.44 g 6    MULTIVIT-IRON-MIN-FOLIC ACID 3,500-18-0.4 UNIT-MG-MG ORAL CHEW Take 1 tablet by mouth 2 (two) times daily.       nystatin (MYCOSTATIN) powder Apply topically 2 (two) times daily. 60 g 3    [START ON 8/5/2023] ofloxacin (OCUFLOX) 0.3 % ophthalmic solution Place 1 drop into the left eye 4 (four) times daily. for 10 days 5 mL 1    omeprazole (PRILOSEC) 20 MG capsule Take 1 capsule (20 mg total) by mouth every morning. 90 capsule 3    oxybutynin (DITROPAN-XL) 5 MG TR24 TAKE 1 TABLET BY MOUTH ONCE DAILY. 90 tablet 3    oxyCODONE-acetaminophen (PERCOCET)  mg per tablet Take 1 tablet by mouth every 8 (eight) hours as needed for Pain. 90 tablet 0    [START ON 8/8/2023] prednisoLONE acetate (PRED FORTE) 1 % DrpS Place 1 drop into the left eye 4 (four) times daily. 5 mL 1    tiZANidine (ZANAFLEX) 4 MG tablet Take 1 tablet (4 mg total) by mouth every 8 (eight) hours as needed (muscle pain). 90 tablet 2    urea (CARMOL) 40 % Crea Apply topically once daily. To dry skin on the feet. 120 g 5    vitamin D 185 MG Tab Take 5,000 mg by mouth every morning.        Current Facility-Administered Medications on File Prior to Visit   Medication Dose Route Frequency Provider Last Rate Last Admin    0.9%  NaCl infusion   Intravenous Continuous Lina Wagner MD 25 mL/hr at 12/15/20 1506 25 mL/hr at 12/15/20 1506    0.9%  NaCl infusion   Intravenous Continuous Ciro Chung MD         Review of patient's allergies indicates:   Allergen Reactions    Strawberry Anaphylaxis       Review of Systems   Constitutional: Negative for chills, fever and night  "sweats.   HENT:  Negative for hearing loss.    Eyes:  Negative for blurred vision and double vision.   Cardiovascular:  Negative for chest pain, claudication and leg swelling.   Respiratory:  Negative for shortness of breath.    Endocrine: Negative for polydipsia, polyphagia and polyuria.   Hematologic/Lymphatic: Negative for adenopathy and bleeding problem. Does not bruise/bleed easily.   Skin:  Negative for poor wound healing.   Musculoskeletal:  Positive for joint pain.   Gastrointestinal:  Negative for diarrhea and heartburn.   Genitourinary:  Negative for bladder incontinence.   Neurological:  Negative for focal weakness, headaches, numbness, paresthesias and sensory change.   Psychiatric/Behavioral:  The patient is not nervous/anxious.    Allergic/Immunologic: Negative for persistent infections.       Objective:      Body mass index is 40.26 kg/m².  Vitals:    07/26/23 1413   Weight: 109.8 kg (241 lb 15.3 oz)   Height: 5' 5" (1.651 m)         General    Constitutional: She is oriented to person, place, and time. She appears well-developed and well-nourished.   HENT:   Head: Normocephalic and atraumatic.   Eyes: EOM are normal.   Cardiovascular:  Normal rate.            Pulmonary/Chest: Effort normal.   Neurological: She is alert and oriented to person, place, and time.   Psychiatric: She has a normal mood and affect. Her behavior is normal.     General Musculoskeletal Exam   Gait: normal       Right Knee Exam     Inspection   Erythema: absent  Scars: present  Swelling: absent  Effusion: absent  Deformity: absent  Bruising: absent    Tenderness   The patient is tender to palpation of the medial retinaculum and lateral retinaculum.    Range of Motion   Extension:  10 (Extensor Lag)   Flexion:  120     Tests   Ligament Examination   Lachman: normal (-1 to 2mm)   MCL - Valgus: normal (0 to 2mm)  LCL - Varus: normal  Patella   Passive Patellar Tilt: neutral    Other   Sensation: normal    Left Knee Exam "     Inspection   Erythema: absent  Scars: present  Swelling: absent  Effusion: absent  Deformity: absent  Bruising: absent    Tenderness   The patient tender to palpation of the medial joint line and lateral retinaculum.    Range of Motion   Extension:  0   Flexion:  120     Tests   Stability   Lachman: normal (-1 to 2mm)   MCL - Valgus: normal (0 to 2mm)  LCL - Varus: normal (0 to 2mm)  Patella   Passive Patellar Tilt: neutral    Other   Sensation: normal    Muscle Strength   Right Lower Extremity   Hip Abduction: 5/5   Quadriceps:  5/5   Hamstrin/5   Left Lower Extremity   Hip Abduction: 5/5   Quadriceps:  5/5   Hamstrin/5     Vascular Exam     Right Pulses  Dorsalis Pedis:      2+          Left Pulses  Dorsalis Pedis:      2+          Edema  Right Lower Leg: absent  Left Lower Leg: absent    X-rays taken today were reviewed by me and compared to prior radiographs.  She has well-fixed and positioned bilateral total knee arthroplasties.  There is a tibial stem on the right.  There is patellar fragmentation.  There has been no change since prior.        Assessment:       Encounter Diagnoses   Name Primary?    Chronic pain of both knees Yes    History of revision of total replacement of right knee joint           Plan:       Alivia was seen today for pain and pain.    Diagnoses and all orders for this visit:    Chronic pain of both knees  -     X-ray Knee Ortho Bilateral; Future    History of revision of total replacement of right knee joint        Although I suspect her pain is related to her patella due to the recall and like to get mars MRIs of both knees to assess for loosening.

## 2023-08-01 ENCOUNTER — TELEPHONE (OUTPATIENT)
Dept: OPHTHALMOLOGY | Facility: CLINIC | Age: 58
End: 2023-08-01
Payer: MEDICARE

## 2023-08-02 ENCOUNTER — PATIENT MESSAGE (OUTPATIENT)
Dept: BARIATRICS | Facility: CLINIC | Age: 58
End: 2023-08-02
Payer: MEDICARE

## 2023-08-06 NOTE — H&P
[facility]  History & Physical    Subjective:      Chief Complaint/Reason for Admission:     Alivia Thomas is a 57 y.o. female.    Past Medical History:   Diagnosis Date    Allergy     Anemia     Asthma     Bilateral shoulder bursitis     Cervical stenosis of spine     Chronic pain     DDD (degenerative disc disease), cervical     Depression 01/28/2019    Diabetes mellitus type II     DJD (degenerative joint disease) of knee 06/19/2014    Facet arthritis of lumbar region 12/17/2015    Facet syndrome 12/17/2015    GERD (gastroesophageal reflux disease)     Heartburn     Hyperlipidemia     Hypertension     Morbid obesity     Neuromuscular disorder     PADDY (obstructive sleep apnea)     Proteinuria     Right carpal tunnel syndrome     Sacroiliitis 06/13/2018    Sacroiliitis     Sleep apnea     Uterine fibroid      Past Surgical History:   Procedure Laterality Date    CARPAL TUNNEL RELEASE      CARPAL TUNNEL RELEASE  1980s    left    CARPAL TUNNEL RELEASE  2012    right    COLONOSCOPY N/A 6/15/2020    Procedure: COLONOSCOPY;  Surgeon: Jc Koo MD;  Location: [facility] (4TH FLR);  Service: Endoscopy;  Laterality: N/A;  COVID screening on 6/13/20 at [facility]  pt updated on drop off location and no visitor policy-    EPIDURAL STEROID INJECTION N/A 7/24/2023    Procedure: INJECTION, STEROID, EPIDURAL, L5/S1 SOONER DATE;  Surgeon: Israel Hurtado MD;  Location: [facility] PAIN MGT;  Service: Pain Management;  Laterality: N/A;    ESOPHAGOGASTRODUODENOSCOPY N/A 3/27/2019    Procedure: EGD (ESOPHAGOGASTRODUODENOSCOPY);  Surgeon: Robbin Bar MD;  Location: [facility] (2ND FLR);  Service: Endoscopy;  Laterality: N/A;  BMI 61.3/2nd floor case/svn    INJECTION OF JOINT Bilateral 6/9/2020    Procedure: INJECTION, JOINT, BILATERAL SI;  Surgeon: Lina Wagner MD;  Location: [facility] PAIN MGT;  Service: Pain Management;  Laterality: Bilateral;  B/L SI Joint Injection    INJECTION OF  JOINT Bilateral 5/11/2021    Procedure: INJECTION, JOINT, SACROILIAC (SI) need consent;  Surgeon: Lina Wagner MD;  Location: [facility] PAIN MGT;  Service: Pain Management;  Laterality: Bilateral;    JOINT REPLACEMENT Bilateral     with 2 revisions on rt    KNEE SURGERY  3/2010    orthroscope    KNEE SURGERY  6-19-14    left TKR    LAPAROSCOPIC SLEEVE GASTRECTOMY N/A 8/28/2019    Procedure: GASTRECTOMY, SLEEVE, LAPAROSCOPIC with intraop EGD;  Surgeon: Heriberto Clements MD;  Location: [facility] OR 2ND FLR;  Service: General;  Laterality: N/A;    PANNICULECTOMY N/A 6/14/2022    Procedure: PANNICULECTOMY;  Surgeon: Rhett Gray MD;  [facility];  Service: Plastics;  Laterality: N/A;    RADIOFREQUENCY ABLATION Right 7/9/2019    Procedure: RADIOFREQUENCY ABLATION;  Surgeon: Lina Wagner MD;  Location: [facility] PAIN MGT;  Service: Pain Management;  Laterality: Right;  RIGHT RFA L2,3,4,5  2 of 2    RADIOFREQUENCY ABLATION Left 12/19/2019    Procedure: RADIOFREQUENCY ABLATION, LEFT L2-L3-L4-L5 MEDIAL BRANCH 1 OF 2;  Surgeon: Lina Wagner MD;  Location: [facility] PAIN MGT;  Service: Pain Management;  Laterality: Left;    RADIOFREQUENCY ABLATION Right 12/31/2019    Procedure: RADIOFREQUENCY ABLATION, RIGHT L2-L3-L4-L5  2 OF 2;  Surgeon: Lina Wagner MD;  Location: [facility] PAIN MGT;  Service: Pain Management;  Laterality: Right;    RADIOFREQUENCY ABLATION Right 10/13/2020    Procedure: RADIOFREQUENCY ABLATION, Genicular Cooled 1 of 2;  Surgeon: Lina Wagner MD;  Location: [facility] PAIN MGT;  Service: Pain Management;  Laterality: Right;    RADIOFREQUENCY ABLATION Left 11/3/2020    Procedure: RADIOFREQUENCY ABLATION, GENICULAR COOLED  2 OF 2;  Surgeon: Lina Wagner MD;  Location: [facility] PAIN MGT;  Service: Pain Management;  Laterality: Left;    RADIOFREQUENCY ABLATION Left 12/15/2020    Procedure: RADIOFREQUENCY ABLATION LEFT L2,3,4,5 1 of 2;  Surgeon: Lina Wagner MD;  Location: [facility]  PAIN MGT;  Service: Pain Management;  Laterality: Left;    RADIOFREQUENCY ABLATION Right 12/29/2020    Procedure: RADIOFREQUENCY ABLATION RIGHT L2,3,4,5 2 of 2;  Surgeon: Lina Wagner MD;  Location: [facility] PAIN MGT;  Service: Pain Management;  Laterality: Right;    RADIOFREQUENCY ABLATION Left 6/29/2021    Procedure: RADIOFREQUENCY ABLATION GENICULAR COOLED;  Surgeon: Lina Wagner MD;  Location: [facility] PAIN MGT;  Service: Pain Management;  Laterality: Left;  1 of 2    RADIOFREQUENCY ABLATION Right 7/13/2021    Procedure: RADIOFREQUENCY ABLATION GENICULAR;  Surgeon: Lina Wagner MD;  Location: [facility] PAIN MGT;  Service: Pain Management;  Laterality: Right;  2 of 2    RADIOFREQUENCY ABLATION Right 8/24/2021    Procedure: RADIOFREQUENCY ABLATION, L2-L3-L4-L5 MEDIAL BRANCH 1 OF 2;  Surgeon: Lina Wagner MD;  Location: [facility] PAIN MGT;  Service: Pain Management;  Laterality: Right;    RADIOFREQUENCY ABLATION Left 9/11/2021    Procedure: RADIOFREQUENCY ABLATION, L2-L3-L4-L5 MEDIAL BR ANCH 2 OF 2;  Surgeon: Lina Wagner MD;  Location: [facility] PAIN MGT;  Service: Pain Management;  Laterality: Left;    RADIOFREQUENCY ABLATION Right 3/7/2022    Procedure: RADIOFREQUENCY ABLATION RIGHT L3,4,5 1 of 2, needs consent;  Surgeon: Israel Hurtado MD;  Location: [facility] PAIN MGT;  Service: Pain Management;  Laterality: Right;    RADIOFREQUENCY ABLATION Left 3/21/2022    Procedure: RADIOFREQUENCY ABLATION RIGHT L3,4,5 2 of 2, needs consent;  Surgeon: Israel Hurtado MD;  Location: [facility] PAIN MGT;  Service: Pain Management;  Laterality: Left;    RADIOFREQUENCY ABLATION Right 5/9/2022    Procedure: RADIOFREQUENCY ABLATION RIGHT GENICULAR COOLED 1 of 2;  Surgeon: Israel Hurtado MD;  Location: [facility] PAIN MGT;  Service: Pain Management;  Laterality: Right;    RADIOFREQUENCY ABLATION Left 5/23/2022    Procedure: RADIOFREQUENCY ABLATION LEFT GENICULAR COOLED  2 of 2;  Surgeon: Israel Hurtado MD;  Location: [facility] PAIN MGT;  Service: Pain  Management;  Laterality: Left;    RADIOFREQUENCY ABLATION Right 12/12/2022    Procedure: RADIOFREQUENCY ABLATION RIGHT GENICULAR COOLED  ONE OF TWO  NEEDS CONSENT;  Surgeon: Israel Hurtado MD;  Location: [facility] PAIN MGT;  Service: Pain Management;  Laterality: Right;    RADIOFREQUENCY ABLATION Left 1/9/2023    Procedure: RADIOFREQUENCY ABLATION LEFT GENICULAR COOLED  TWO OF TWO NEEDS CONSENT;  Surgeon: Israel Hurtado MD;  Location: [facility] PAIN MGT;  Service: Pain Management;  Laterality: Left;    RADIOFREQUENCY ABLATION Right 3/6/2023    Procedure: RADIOFREQUENCY ABLATION RIGHT L3,L4,L5;  Surgeon: Israel Hurtado MD;  Location: [facility] PAIN MGT;  Service: Pain Management;  Laterality: Right;    RADIOFREQUENCY ABLATION Left 5/22/2023    Procedure: RADIOFREQUENCY ABLATION LEFT L3,L4,L5;  Surgeon: Israel Hurtado MD;  Location: [facility] PAIN MGT;  Service: Pain Management;  Laterality: Left;  4/3 LM TO R/S    RADIOFREQUENCY ABLATION OF LUMBAR MEDIAL BRANCH NERVE AT SINGLE LEVEL Left 6/26/2018    Procedure: RADIOFREQUENCY ABLATION, NERVE, MEDIAL BRANCH, LUMBAR, 1 LEVEL;  Surgeon: Lina Wagner MD;  Location: [facility] PAIN MGT;  Service: Pain Management;  Laterality: Left;  Left Cooled RFA @ L2,3,4,5  [id number]  with Sedation    1 of 2    RADIOFREQUENCY ABLATION OF LUMBAR MEDIAL BRANCH NERVE AT SINGLE LEVEL Right 7/10/2018    Procedure: RADIOFREQUENCY ABLATION, NERVE, MEDIAL BRANCH, LUMBAR, 1 LEVEL;  Surgeon: Lina Wagner MD;  Location: [facility] PAIN MGT;  Service: Pain Management;  Laterality: Right;  RIght Cooled RFA @ L2,3,4,5  [id number] with Sedation    2 of 2    TRIGGER FINGER RELEASE Right 2017    TRIGGER FINGER RELEASE Left 7/29/2019    Procedure: RELEASE, TRIGGER FINGER, Left Thumb;  Surgeon: Velma Garcia MD;  Location: [facility] OR;  Service: Orthopedics;  Laterality: Left;  Local w/ MAC     Family History   Problem Relation Age of Onset    Diabetes Mother     Cataracts Mother     Diabetes Father     Cataracts Father      Hypertension Sister     Diabetes Sister     No Known Problems Sister     Cancer Sister         lymphoma     Coronary artery disease Brother     Diabetes Brother     Diabetes Brother     Hypotension Brother     Kidney failure Brother     Hypotension Brother     Amblyopia Neg Hx     Blindness Neg Hx     Glaucoma Neg Hx     Macular degeneration Neg Hx     Retinal detachment Neg Hx     Strabismus Neg Hx     Stroke Neg Hx     Thyroid disease Neg Hx     Anesthesia problems Neg Hx      Social History     Tobacco Use    Smoking status: Never    Smokeless tobacco: Never   Substance Use Topics    Alcohol use: Not Currently     Comment: occasionally     Drug use: No       No medications prior to admission.     Review of patient's allergies indicates:   Allergen Reactions    Strawberry Anaphylaxis        Review of Systems   Eyes:  Positive for blurred vision.   All other systems reviewed and are negative.      Objective:      Vital Signs (Most Recent)       Vital Signs Range (Last 24H):  BP: ()/()   Arterial Line BP: ()/()     Physical Exam  Constitutional:       Appearance: She is well-developed.   HENT:      Head: Normocephalic.   Eyes:      Conjunctiva/sclera: Conjunctivae normal.      Pupils: Pupils are equal, round, and reactive to light.   Cardiovascular:      Rate and Rhythm: Normal rate and regular rhythm.      Heart sounds: Normal heart sounds.   Pulmonary:      Effort: Pulmonary effort is normal.      Breath sounds: Normal breath sounds.   Abdominal:      General: Bowel sounds are normal.      Palpations: Abdomen is soft.   Musculoskeletal:         General: Normal range of motion.      Cervical back: Normal range of motion and neck supple.   Skin:     General: Skin is warm.   Neurological:      Mental Status: She is alert and oriented to person, place, and time.         Data Review:     ECG:     Assessment:      Cataract OS.    Plan:    CE OS.

## 2023-08-07 ENCOUNTER — ANESTHESIA EVENT (OUTPATIENT)
Dept: SURGERY | Facility: HOSPITAL | Age: 58
End: 2023-08-07
Payer: MEDICARE

## 2023-08-07 ENCOUNTER — OFFICE VISIT (OUTPATIENT)
Dept: PODIATRY | Facility: CLINIC | Age: 58
End: 2023-08-07
Payer: MEDICARE

## 2023-08-07 VITALS
WEIGHT: 241 LBS | HEART RATE: 48 BPM | SYSTOLIC BLOOD PRESSURE: 145 MMHG | HEIGHT: 65 IN | BODY MASS INDEX: 40.15 KG/M2 | DIASTOLIC BLOOD PRESSURE: 74 MMHG

## 2023-08-07 DIAGNOSIS — B35.1 DERMATOPHYTOSIS OF NAIL: ICD-10-CM

## 2023-08-07 DIAGNOSIS — L84 CORNS AND CALLUS: ICD-10-CM

## 2023-08-07 DIAGNOSIS — E11.49 TYPE II DIABETES MELLITUS WITH NEUROLOGICAL MANIFESTATIONS: Primary | ICD-10-CM

## 2023-08-07 PROCEDURE — 11056 ROUTINE FOOT CARE: ICD-10-PCS | Mod: Q9,S$PBB,, | Performed by: PODIATRIST

## 2023-08-07 PROCEDURE — 11721 ROUTINE FOOT CARE: ICD-10-PCS | Mod: 59,Q9,S$PBB, | Performed by: PODIATRIST

## 2023-08-07 PROCEDURE — 99999 PR PBB SHADOW E&M-EST. PATIENT-LVL IV: CPT | Mod: PBBFAC,,, | Performed by: PODIATRIST

## 2023-08-07 PROCEDURE — 99214 OFFICE O/P EST MOD 30 MIN: CPT | Mod: PBBFAC,PN | Performed by: PODIATRIST

## 2023-08-07 PROCEDURE — 99999 PR PBB SHADOW E&M-EST. PATIENT-LVL IV: ICD-10-PCS | Mod: PBBFAC,,, | Performed by: PODIATRIST

## 2023-08-07 PROCEDURE — 99499 UNLISTED E&M SERVICE: CPT | Mod: S$PBB,,, | Performed by: PODIATRIST

## 2023-08-07 PROCEDURE — 11056 PARNG/CUTG B9 HYPRKR LES 2-4: CPT | Mod: Q9,S$PBB,, | Performed by: PODIATRIST

## 2023-08-07 PROCEDURE — 11721 DEBRIDE NAIL 6 OR MORE: CPT | Performed by: PODIATRIST

## 2023-08-07 PROCEDURE — 99499 NO LOS: ICD-10-PCS | Mod: S$PBB,,, | Performed by: PODIATRIST

## 2023-08-07 NOTE — PROGRESS NOTES
Chief Complaint   Patient presents with    Diabetic Foot Exam     Foot Exam/PCP Clarisse Richardson MD  03/13/23           HPI:   The patient is a 57 y.o.  female  who presents for a diabetic foot exam/care   Patient reports + presence of abnormal sensation to the feet, just sometimes, mostly at night.   Patient has no history of wounds on the feet.   Hx of foot surgery: none.     This patient has documented high risk feet requiring routine maintenance secondary to diabetes.          Primary care doctor is: Clarisse Richardson MD  Patient last saw primary care doctor on:  3/13/2023        Patient Active Problem List   Diagnosis    Morbid obesity    PADDY (obstructive sleep apnea)    Type 2 diabetes mellitus without complication, without long-term current use of insulin    Nuclear sclerosis - Both Eyes    Hyperlipemia    Proteinuria    Type 2 diabetes mellitus without retinopathy - Both Eyes    Bilateral knee pain    DJD (degenerative joint disease) of knee    Debility    Prosthetic joint implant failure    Failed total knee arthroplasty    Right knee pain    Knee pain    History of total left knee replacement    Lumbago    CTS (carpal tunnel syndrome)    Right carpal tunnel syndrome    Chronic, continuous use of opioids    Mild intermittent asthma without complication    Left arm pain    Left shoulder pain    Allergic reaction to food    DDD (degenerative disc disease), cervical    Cervical radiculopathy    Cervical spondylosis    Cubital tunnel syndrome on right    Bilateral shoulder bursitis    Cervical stenosis of spinal canal    DDD (degenerative disc disease), thoracic    Thoracic spondylosis    Spondylolisthesis of lumbar region    Lumbar spondylosis    Spondylolisthesis of cervical region    Cervical spine instability    Myeloradiculopathy    Neural foraminal stenosis of cervical spine    DDD (degenerative disc disease), lumbar    Idiopathic scoliosis of  thoracolumbar spine    Bilateral shoulder region arthritis    Impingement syndrome of right shoulder    Trochanteric bursitis of both hips    Chronic pain    Depression    Recurrent major depressive disorder, in partial remission    GERD (gastroesophageal reflux disease)    Trigger finger    Dyspnea    BMI 45.0-49.9, adult    Sacroiliac joint pain    Spondylosis of lumbosacral region without myelopathy or radiculopathy    Subacromial bursitis of left shoulder joint    Sacroiliitis    Snoring    BMI 39.0-39.9,adult    S/P panniculectomy    Gout    History of sleeve gastrectomy    History of total right knee replacement    Noncompliance with CPAP treatment    Osteoarthritis of lumbar spine    Aortic atherosclerosis    Uterine fibroid           Current Outpatient Medications on File Prior to Visit   Medication Sig Dispense Refill    albuterol (VENTOLIN HFA) 90 mcg/actuation inhaler INHALE TWO PUFFS INTO LUNGS EVERY 4 TO 6 HOURS AS NEEDED FOR SHORTNESS OF BREATH AND FOR WHEEZING 18 g 1    allopurinoL (ZYLOPRIM) 300 MG tablet Take 1 tablet (300 mg total) by mouth 2 (two) times daily. 180 tablet 3    atorvastatin (LIPITOR) 20 MG tablet Take 1 tablet (20 mg total) by mouth once daily. 90 tablet 3    b complex vitamins tablet Take 1 tablet by mouth every other day.       calcium citrate/vitamin D2 (ISIAH-CITRATE ORAL) Take 500 mg by mouth 2 (two) times daily.      colchicine (MITIGARE) 0.6 mg Cap Take 1 capsule (0.6 mg total) by mouth daily as needed (flare up). 90 capsule 3    cyanocobalamin (VITAMIN B-12) 1000 MCG tablet Take 100 mcg by mouth every morning.      diclofenac sodium (VOLTAREN) 1 % Gel Apply 2 g topically 4 (four) times daily as needed. To painful area on the feet. 500 g 5    fluconazole (DIFLUCAN) 150 MG Tab Take 1 tablet (150 mg total) by mouth Every 3 (three) days. 3 tablet 0    FLUoxetine (PROZAC) 20 mg/5 mL (4 mg/mL) solution TAKE 5 MLS BY MOUTH ONCE DAILY. 450 mL 3     fluticasone propionate (FLONASE) 50 mcg/actuation nasal spray 1 SPRAY BY EACH NOSTRIL ROUTE 2 TIMES DAILY AS NEEDED FOR RHINITIS. 48 g 3    indomethacin (INDOCIN) 50 MG capsule TAKE 1 CAPSULE BY MOUTH 2 TIMES DAILY WITH MEALS (Patient taking differently: Take 50 mg by mouth as needed.) 30 capsule 2    ketorolac 0.5% (ACULAR) 0.5 % Drop Place 1 drop into the left eye 4 (four) times daily. 5 mL 1    LIDOcaine (XYLOCAINE) 5 % Oint ointment Apply topically as needed. 35.44 g 6    MULTIVIT-IRON-MIN-FOLIC ACID 3,500-18-0.4 UNIT-MG-MG ORAL CHEW Take 1 tablet by mouth 2 (two) times daily.       nystatin (MYCOSTATIN) powder Apply topically 2 (two) times daily. 60 g 3    ofloxacin (OCUFLOX) 0.3 % ophthalmic solution Place 1 drop into the left eye 4 (four) times daily. for 10 days 5 mL 1    omeprazole (PRILOSEC) 20 MG capsule Take 1 capsule (20 mg total) by mouth every morning. 90 capsule 3    oxybutynin (DITROPAN-XL) 5 MG TR24 TAKE 1 TABLET BY MOUTH ONCE DAILY. 90 tablet 3    oxyCODONE-acetaminophen (PERCOCET)  mg per tablet Take 1 tablet by mouth every 8 (eight) hours as needed for Pain. 90 tablet 0    [START ON 8/8/2023] prednisoLONE acetate (PRED FORTE) 1 % DrpS Place 1 drop into the left eye 4 (four) times daily. 5 mL 1    tiZANidine (ZANAFLEX) 4 MG tablet Take 1 tablet (4 mg total) by mouth every 8 (eight) hours as needed (muscle pain). 90 tablet 2    urea (CARMOL) 40 % Crea Apply topically once daily. To dry skin on the feet. 120 g 5    vitamin D 185 MG Tab Take 5,000 mg by mouth every morning.        Current Facility-Administered Medications on File Prior to Visit   Medication Dose Route Frequency Provider Last Rate Last Admin    0.9%  NaCl infusion   Intravenous Continuous Lina Wagner MD 25 mL/hr at 12/15/20 1506 25 mL/hr at 12/15/20 1506    0.9%  NaCl infusion   Intravenous Continuous Ciro Chung MD           Review of patient's allergies indicates:  No Known  "Allergies                  ROS:  General ROS: negative  Respiratory ROS: no cough, shortness of breath, or wheezing  Cardiovascular ROS: no chest pain or dyspnea on exertion  Musculoskeletal ROS: negative  Neurological ROS:   negative for - gait disturbance, + numbness/tingling, seizures or tremors  Dermatological ROS: + nail changes, dry skin          LAST HbA1c:   Hemoglobin A1C   Date Value Ref Range Status   03/13/2023 6.3 (H) 4.0 - 5.6 % Final     Comment:     ADA Screening Guidelines:  5.7-6.4%  Consistent with prediabetes  >or=6.5%  Consistent with diabetes    High levels of fetal hemoglobin interfere with the HbA1C  assay. Heterozygous hemoglobin variants (HbS, HgC, etc)do  not significantly interfere with this assay.   However, presence of multiple variants may affect accuracy.     05/09/2022 5.9 (H) 4.0 - 5.6 % Final     Comment:     ADA Screening Guidelines:  5.7-6.4%  Consistent with prediabetes  >or=6.5%  Consistent with diabetes    High levels of fetal hemoglobin interfere with the HbA1C  assay. Heterozygous hemoglobin variants (HbS, HgC, etc)do  not significantly interfere with this assay.   However, presence of multiple variants may affect accuracy.     11/08/2021 6.3 (H) 4.0 - 5.6 % Final     Comment:     ADA Screening Guidelines:  5.7-6.4%  Consistent with prediabetes  >or=6.5%  Consistent with diabetes    High levels of fetal hemoglobin interfere with the HbA1C  assay. Heterozygous hemoglobin variants (HbS, HgC, etc)do  not significantly interfere with this assay.   However, presence of multiple variants may affect accuracy.           EXAM:   Vitals:    08/07/23 0833   BP: (!) 145/74   Pulse: (!) 48   Weight: 109.3 kg (241 lb)   Height: 5' 5" (1.651 m)       General: Pt. is well-developed, well-nourished, appears stated age, in no acute distress, alert and oriented x 3.      Vascular: Dorsalis pedis and posterior tibial pulses are 2/4 bilaterally. 3 secs capillary refill time and toes are warm to " touch.    There is reduced hair growth on the feet.    There is 1+ edema.  no varicosities.        Neurological:  Light touch, proprioception, and sharp/dull sensation are all intact bilaterally. Protective threshold with the [place] monofilament is diminished bilaterally.   +paresthesias      Dermatological:  The skin is thin    The toenails  1-5 L and 1-5 R  are greenish- yellow, long by 5-6mm and thickened by 2-3mm with subungual debris  .   There are no open wounds. There is no ecchymoses.  no erythema noted.   Skin diffusely dry on the soles     Interdigital spaces are intact, no fissures    Skin atrophic, thin, dry and mildly hyperpigmented.         Musculoskeletal:    Muscle strength is 5/5 in all groups bilaterally.    Metatarsophalangeal, subtalar, and ankle range of motion are intact without crepitus.     Ankle equinus bilateral         Assessment / Plan:      ICD-10-CM ICD-9-CM    1. Type II diabetes mellitus with neurological manifestations  E11.49 250.60       2. Dermatophytosis of nail  B35.1 110.1       3. Corns and callus  L84 700             I counseled the patient on the patient's conditions, their implications and medical management.  Shoe inspection.     Continue good nutrition and blood sugar control to help prevent podiatric complications of diabetes.   Maintain proper foot hygiene.   Continue wearing proper shoe gear, daily foot inspections, never walking without protective shoe gear, never putting sharp instruments to feet.  Shoe and activity modification as needed for relief.       Routine Foot Care    Date/Time: 8/7/2023 8:15 AM    Performed by: Stacey Rowland DPM  Authorized by: Stacey Rowland DPM    Consent Done?:  Yes (Verbal)  Hyperkeratotic Skin Lesions?: Yes    Number of trimmed lesions:  2  Location(s):  Left 1st Toe and Right 1st Toe    Nail Care Type:  Debride  Location(s): All  (Left 1st Toe, Left 3rd Toe, Left 2nd Toe, Left 4th Toe, Left 5th Toe, Right 1st Toe, Right 2nd  Toe, Right 3rd Toe, Right 4th Toe and Right 5th Toe)  Patient tolerance:  Patient tolerated the procedure well with no immediate complications     With patient's permission, the toenails mentioned above were reduced and debrided using a nail nipper, removing offending nail and debris.   Utilizing a #15 scalpel, I trimmed the corns and calluses at the above mentioned location.      The patient will continue to monitor the areas daily, inspect the feet, wear protective shoe gear when ambulatory, and moisturizer to maintain skin integrity.

## 2023-08-07 NOTE — ANESTHESIA PREPROCEDURE EVALUATION
08/07/2023  Alivia Thomas is a 57 y.o., female.    Pre-operative evaluation for Procedure(s) (LRB):  PANNICULECTOMY (N/A)      Patient Active Problem List   Diagnosis    Morbid obesity    PADDY (obstructive sleep apnea)    Type 2 diabetes mellitus without complication, without long-term current use of insulin    Nuclear sclerosis - Both Eyes    Hyperlipemia    Proteinuria    Type 2 diabetes mellitus without retinopathy - Both Eyes    Bilateral knee pain    DJD (degenerative joint disease) of knee    Debility    Prosthetic joint implant failure    Failed total knee arthroplasty    Right knee pain    Knee pain    History of total left knee replacement    Lumbago    CTS (carpal tunnel syndrome)    Right carpal tunnel syndrome    Chronic, continuous use of opioids    Mild intermittent asthma without complication    Left arm pain    Left shoulder pain    Allergic reaction to food    DDD (degenerative disc disease), cervical    Cervical radiculopathy    Cervical spondylosis    Cubital tunnel syndrome on right    Bilateral shoulder bursitis    Cervical stenosis of spinal canal    DDD (degenerative disc disease), thoracic    Thoracic spondylosis    Spondylolisthesis of lumbar region    Lumbar spondylosis    Spondylolisthesis of cervical region    Cervical spine instability    Myeloradiculopathy    Neural foraminal stenosis of cervical spine    DDD (degenerative disc disease), lumbar    Idiopathic scoliosis of thoracolumbar spine    Bilateral shoulder region arthritis    Impingement syndrome of right shoulder    Trochanteric bursitis of both hips    Chronic pain    Depression    Recurrent major depressive disorder, in partial remission    GERD (gastroesophageal reflux disease)    Trigger finger    Dyspnea    BMI 45.0-49.9, adult    Sacroiliac joint pain     Spondylosis of lumbosacral region without myelopathy or radiculopathy    Subacromial bursitis of left shoulder joint    Sacroiliitis    Snoring    BMI 39.0-39.9,adult    S/P panniculectomy    Gout    History of sleeve gastrectomy    History of total right knee replacement    Noncompliance with CPAP treatment    Osteoarthritis of lumbar spine    Aortic atherosclerosis    Uterine fibroid       Review of patient's allergies indicates:   Allergen Reactions    Strawberry Anaphylaxis       Current Outpatient Medications on File Prior to Visit   Medication Sig Dispense Refill    albuterol (VENTOLIN HFA) 90 mcg/actuation inhaler INHALE TWO PUFFS INTO LUNGS EVERY 4 TO 6 HOURS AS NEEDED FOR SHORTNESS OF BREATH AND FOR WHEEZING 18 g 1    allopurinoL (ZYLOPRIM) 300 MG tablet Take 1 tablet (300 mg total) by mouth 2 (two) times daily. 180 tablet 3    atorvastatin (LIPITOR) 20 MG tablet Take 1 tablet (20 mg total) by mouth once daily. 90 tablet 3    b complex vitamins tablet Take 1 tablet by mouth every other day.       calcium citrate/vitamin D2 (ISIAH-CITRATE ORAL) Take 500 mg by mouth 2 (two) times daily.      colchicine (MITIGARE) 0.6 mg Cap Take 1 capsule (0.6 mg total) by mouth daily as needed (flare up). 90 capsule 3    cyanocobalamin (VITAMIN B-12) 1000 MCG tablet Take 100 mcg by mouth every morning.      diclofenac sodium (VOLTAREN) 1 % Gel Apply 2 g topically 4 (four) times daily as needed. To painful area on the feet. 500 g 5    fluconazole (DIFLUCAN) 150 MG Tab Take 1 tablet (150 mg total) by mouth Every 3 (three) days. 3 tablet 0    FLUoxetine (PROZAC) 20 mg/5 mL (4 mg/mL) solution TAKE 5 MLS BY MOUTH ONCE DAILY. 450 mL 3    fluticasone propionate (FLONASE) 50 mcg/actuation nasal spray 1 SPRAY BY EACH NOSTRIL ROUTE 2 TIMES DAILY AS NEEDED FOR RHINITIS. 48 g 3    indomethacin (INDOCIN) 50 MG capsule TAKE 1 CAPSULE BY MOUTH 2 TIMES DAILY WITH MEALS (Patient taking differently: Take 50 mg by mouth  as needed.) 30 capsule 2    ketorolac 0.5% (ACULAR) 0.5 % Drop Place 1 drop into the left eye 4 (four) times daily. 5 mL 1    LIDOcaine (XYLOCAINE) 5 % Oint ointment Apply topically as needed. 35.44 g 6    MULTIVIT-IRON-MIN-FOLIC ACID 3,500-18-0.4 UNIT-MG-MG ORAL CHEW Take 1 tablet by mouth 2 (two) times daily.       nystatin (MYCOSTATIN) powder Apply topically 2 (two) times daily. 60 g 3    ofloxacin (OCUFLOX) 0.3 % ophthalmic solution Place 1 drop into the left eye 4 (four) times daily. for 10 days 5 mL 1    omeprazole (PRILOSEC) 20 MG capsule Take 1 capsule (20 mg total) by mouth every morning. 90 capsule 3    oxybutynin (DITROPAN-XL) 5 MG TR24 TAKE 1 TABLET BY MOUTH ONCE DAILY. 90 tablet 3    oxyCODONE-acetaminophen (PERCOCET)  mg per tablet Take 1 tablet by mouth every 8 (eight) hours as needed for Pain. 90 tablet 0    [START ON 8/8/2023] prednisoLONE acetate (PRED FORTE) 1 % DrpS Place 1 drop into the left eye 4 (four) times daily. 5 mL 1    tiZANidine (ZANAFLEX) 4 MG tablet Take 1 tablet (4 mg total) by mouth every 8 (eight) hours as needed (muscle pain). 90 tablet 2    urea (CARMOL) 40 % Crea Apply topically once daily. To dry skin on the feet. 120 g 5    vitamin D 185 MG Tab Take 5,000 mg by mouth every morning.        Current Facility-Administered Medications on File Prior to Visit   Medication Dose Route Frequency Provider Last Rate Last Admin    0.9%  NaCl infusion   Intravenous Continuous Lina Wagner MD 25 mL/hr at 12/15/20 1506 25 mL/hr at 12/15/20 1506    0.9%  NaCl infusion   Intravenous Continuous Ciro Chung MD           Past Surgical History:   Procedure Laterality Date    CARPAL TUNNEL RELEASE      CARPAL TUNNEL RELEASE  1980s    left    CARPAL TUNNEL RELEASE  2012    right    COLONOSCOPY N/A 6/15/2020    Procedure: COLONOSCOPY;  Surgeon: Jc Koo MD;  Location: [address]);  Service: Endoscopy;  Laterality: N/A;  COVID screening on 6/13/20 at  [place] -rb  pt updated on drop off location and no visitor policy-rb    EPIDURAL STEROID INJECTION N/A 7/24/2023    Procedure: INJECTION, STEROID, EPIDURAL, L5/S1 SOONER DATE;  Surgeon: Israel Hurtado MD;  Location: [facility] PAIN MGT;  Service: Pain Management;  Laterality: N/A;    ESOPHAGOGASTRODUODENOSCOPY N/A 3/27/2019    Procedure: EGD (ESOPHAGOGASTRODUODENOSCOPY);  Surgeon: Robbin Bar MD;  Location: [facility] (2ND FLR);  Service: Endoscopy;  Laterality: N/A;  BMI 61.3/2nd floor case/svn    INJECTION OF JOINT Bilateral 6/9/2020    Procedure: INJECTION, JOINT, BILATERAL SI;  Surgeon: Lina Wagner MD;  Location: [facility] PAIN [facility]GT;  Service: Pain Management;  Laterality: Bilateral;  B/L SI Joint Injection    INJECTION OF JOINT Bilateral 5/11/2021    Procedure: INJECTION, JOINT, SACROILIAC (SI) need consent;  Surgeon: Lina Wagner MD;  Location: [facility]IN [facility]GT;  Service: Pain Management;  Laterality: Bilateral;    JOINT REPLACEMENT Bilateral     with 2 revisions on rt    KNEE SURGERY  3/2010    orthroscope    KNEE SURGERY  6-19-14    left TKR    LAPAROSCOPIC SLEEVE GASTRECTOMY N/A 8/28/2019    Procedure: GASTRECTOMY, SLEEVE, LAPAROSCOPIC with intraop EGD;  Surgeon: Heriberto Clements MD;  [facility];  Service: General;  Laterality: N/A;    PANNICULECTOMY N/A 6/14/2022    Procedure: PANNICULECTOMY;  Surgeon: Rhett Gray MD;  [facility];  Service: Plastics;  Laterality: N/A;    RADIOFREQUENCY ABLATION Right 7/9/2019    Procedure: RADIOFREQUENCY ABLATION;  Surgeon: Lina Wagner MD;  Location: [facility]IN [facility]GT;  Service: Pain Management;  Laterality: Right;  RIGHT RFA L2,3,4,5  2 of 2    RADIOFREQUENCY ABLATION Left 12/19/2019    Procedure: RADIOFREQUENCY ABLATION, LEFT L2-L3-L4-L5 MEDIAL BRANCH 1 OF 2;  Surgeon: Lina Wagner MD;  Location: [facility]IN [facility]GT;  Service: Pain Management;  Laterality: Left;    RADIOFREQUENCY ABLATION Right  12/31/2019    Procedure: RADIOFREQUENCY ABLATION, RIGHT L2-L3-L4-L5  2 OF 2;  Surgeon: Lina Wagner MD;  Location: [facility];  Service: Pain Management;  Laterality: Right;    RADIOFREQUENCY ABLATION Right 10/13/2020    Procedure: RADIOFREQUENCY ABLATION, Genicular Cooled 1 of 2;  Surgeon: Lina Wagner MD;  Location: [facility]IN [facility]GT;  Service: Pain Management;  Laterality: Right;    RADIOFREQUENCY ABLATION Left 11/3/2020    Procedure: RADIOFREQUENCY ABLATION, GENICULAR COOLED  2 OF 2;  Surgeon: Lina Wagner MD;  Location: [facility]IN [facility]GT;  Service: Pain Management;  Laterality: Left;    RADIOFREQUENCY ABLATION Left 12/15/2020    Procedure: RADIOFREQUENCY ABLATION LEFT L2,3,4,5 1 of 2;  Surgeon: Lina Wagner MD;  Location: [facility];  Service: Pain Management;  Laterality: Left;    RADIOFREQUENCY ABLATION Right 12/29/2020    Procedure: RADIOFREQUENCY ABLATION RIGHT L2,3,4,5 2 of 2;  Surgeon: Lina Wagner MD;  Location: [facility];  Service: Pain Management;  Laterality: Right;    RADIOFREQUENCY ABLATION Left 6/29/2021    Procedure: RADIOFREQUENCY ABLATION GENICULAR COOLED;  Surgeon: Lina Wagner MD;  Location: [facility];  Service: Pain Management;  Laterality: Left;  1 of 2    RADIOFREQUENCY ABLATION Right 7/13/2021    Procedure: RADIOFREQUENCY ABLATION GENICULAR;  Surgeon: Lina Wagner MD;  Location: [facility]IN [facility];  Service: Pain Management;  Laterality: Right;  2 of 2    RADIOFREQUENCY ABLATION Right 8/24/2021    Procedure: RADIOFREQUENCY ABLATION, L2-L3-L4-L5 MEDIAL BRANCH 1 OF 2;  Surgeon: Lina Wagner MD;  Location: [facility];  Service: Pain Management;  Laterality: Right;    RADIOFREQUENCY ABLATION Left 9/11/2021    Procedure: RADIOFREQUENCY ABLATION, L2-L3-L4-L5 MEDIAL BR ANCH 2 OF 2;  Surgeon: Lina Wagner MD;  Location: [facility];  Service: Pain Management;  Laterality: Left;    RADIOFREQUENCY ABLATION Right 3/7/2022     Procedure: RADIOFREQUENCY ABLATION RIGHT L3,4,5 1 of 2, needs consent;  Surgeon: Israel Hurtado MD;  Location: [facility]IN [facility]GT;  Service: Pain Management;  Laterality: Right;    RADIOFREQUENCY ABLATION Left 3/21/2022    Procedure: RADIOFREQUENCY ABLATION RIGHT L3,4,5 2 of 2, needs consent;  Surgeon: Israel Hurtado MD;  Location: [facility];  Service: Pain Management;  Laterality: Left;    RADIOFREQUENCY ABLATION Right 5/9/2022    Procedure: RADIOFREQUENCY ABLATION RIGHT GENICULAR COOLED 1 of 2;  Surgeon: Israel Hurtado MD;  Location: [facility];  Service: Pain Management;  Laterality: Right;    RADIOFREQUENCY ABLATION Left 5/23/2022    Procedure: RADIOFREQUENCY ABLATION LEFT GENICULAR COOLED  2 of 2;  Surgeon: Israel Hurtado MD;  Location: [facility] PAIN [facility]GT;  Service: Pain Management;  Laterality: Left;    RADIOFREQUENCY ABLATION Right 12/12/2022    Procedure: RADIOFREQUENCY ABLATION RIGHT GENICULAR COOLED  ONE OF TWO  NEEDS CONSENT;  Surgeon: Israel Hurtado MD;  Location: [facility]IN [facility]GT;  Service: Pain Management;  Laterality: Right;    RADIOFREQUENCY ABLATION Left 1/9/2023    Procedure: RADIOFREQUENCY ABLATION LEFT GENICULAR COOLED  TWO OF TWO NEEDS CONSENT;  Surgeon: Israel Hurtado MD;  Location: [facility]IN [facility]GT;  Service: Pain Management;  Laterality: Left;    RADIOFREQUENCY ABLATION Right 3/6/2023    Procedure: RADIOFREQUENCY ABLATION RIGHT L3,L4,L5;  Surgeon: Israel Hurtado MD;  Location: [facility]IN [facility]GT;  Service: Pain Management;  Laterality: Right;    RADIOFREQUENCY ABLATION Left 5/22/2023    Procedure: RADIOFREQUENCY ABLATION LEFT L3,L4,L5;  Surgeon: Israel Hurtado MD;  Location: [facility]IN [facility]GT;  Service: Pain Management;  Laterality: Left;  4/3 LM TO R/S    RADIOFREQUENCY ABLATION OF LUMBAR MEDIAL BRANCH NERVE AT SINGLE LEVEL Left 6/26/2018    Procedure: RADIOFREQUENCY ABLATION, NERVE, MEDIAL BRANCH, LUMBAR, 1 LEVEL;  Surgeon: Lina Wagner MD;  Location: [facility]IN [facility]GT;  Service: Pain Management;  Laterality:  Left;  Left Cooled RFA @ L2,3,4,5  [id number]  with Sedation    1 of 2    RADIOFREQUENCY ABLATION OF LUMBAR MEDIAL BRANCH NERVE AT SINGLE LEVEL Right 7/10/2018    Procedure: RADIOFREQUENCY ABLATION, NERVE, MEDIAL BRANCH, LUMBAR, 1 LEVEL;  Surgeon: Lina Wagner MD;  Location: [facility] [place];  Service: Pain Management;  Laterality: Right;  RIght Cooled RFA @ L2,3,4,5  [id number] with Sedation    2 of 2    TRIGGER FINGER RELEASE Right 2017    TRIGGER FINGER RELEASE Left 7/29/2019    Procedure: RELEASE, TRIGGER FINGER, Left Thumb;  Surgeon: Velma Garcia MD;  Location: [facility] [place] OR;  Service: Orthopedics;  Laterality: Left;  Local w/ MAC       Social History     Socioeconomic History    Marital status:    Tobacco Use    Smoking status: Never    Smokeless tobacco: Never   Substance and Sexual Activity    Alcohol use: Not Currently     Comment: occasionally     Drug use: No    Sexual activity: Yes     Partners: Female     Birth control/protection: None   Social History Narrative    Disabled. The patient is the youngest of 6 siblings. Single. Lives with single-sex partner.                  Social Determinants of Health     Financial Resource Strain: Unknown (6/12/2023)    Overall Financial Resource Strain (CARDIA)     Difficulty of Paying Living Expenses: Patient refused   Food Insecurity: No Food Insecurity (6/12/2023)    Hunger Vital Sign     Worried About Running Out of Food in the Last Year: Never true     Ran Out of Food in the Last Year: Never true   Transportation Needs: No Transportation Needs (6/12/2023)    PRAPARE - Transportation     Lack of Transportation (Medical): No     Lack of Transportation (Non-Medical): No   Physical Activity: Unknown (6/12/2023)    Exercise Vital Sign     Days of Exercise per Week: Patient refused     Minutes of Exercise per Session: Patient refused   Stress: Stress Concern Present (6/12/2023)    [employer] - Occupational  "Stress Questionnaire     Feeling of Stress : Very much   Social Connections: Unknown (2023)    Social Connection and Isolation Panel [NHANES]     Frequency of Communication with Friends and Family: Twice a week     Frequency of Social Gatherings with Friends and Family: Never     Attends Anabaptist Services: More than 4 times per year     Active Member of Clubs or Organizations: No     Attends Club or Organization Meetings: Never     Marital Status: Patient refused   Housing Stability: Unknown (2023)    Housing Stability Vital Sign     Unable to Pay for Housing in the Last Year: Patient refused     Unstable Housing in the Last Year: Patient refused         CBC: No results for input(s): "WBC", "RBC", "HGB", "HCT", "PLT", "MCV", "MCH", "MCHC" in the last 72 hours.    CMP: No results for input(s): "NA", "K", "CL", "CO2", "BUN", "CREATININE", "GLU", "MG", "PHOS", "CALCIUM", "ALBUMIN", "PROT", "ALKPHOS", "ALT", "AST", "BILITOT" in the last 72 hours.    INR  No results for input(s): "PT", "INR", "PROTIME", "APTT" in the last 72 hours.        Diagnostic Studies:      EKD Echo:  No results found. However, due to the size of the patient record, not all encounters were searched. Please check Results Review for a complete set of results.      Pre-op Assessment    I have reviewed the Patient Summary Reports.    I have reviewed the NPO Status.   I have reviewed the Medications.     Review of Systems  Anesthesia Hx:  No problems with previous Anesthesia 2019 : Easy MV. DLx1 with Craig 2. Grade 1 view. Denies Family Hx of Anesthesia complications.   Denies Personal Hx of Anesthesia complications.   Social:  Non-Smoker, Social Alcohol Use    Hematology/Oncology:  Hematology Normal   Oncology Normal     EENT/Dental:   Wears glasses/ x1 crown   Cardiovascular:   Hypertension hyperlipidemia Walking daily/Housework   Pulmonary:   Asthma asymptomatic Shortness of breath Sleep Apnea, CPAP  "   Renal/:  Renal/ Normal     Hepatic/GI:   GERD    Musculoskeletal:   Arthritis  S/P-Bilateral Knees  Replaced    Chronic pain on opioids   Neurological:   Neuromuscular Disease, S/P- Carpal Tunnel surgery repair/Right carpal tunnel syndrome/Cervical radiculopathy  Chronic Pain Syndrome   Endocrine:   Diabetes, type 2 Pannus, abdominal Obesity / BMI > 30  Dermatological:  Skin Normal    Psych:   Psychiatric History anxiety depression          Physical Exam  General: Well nourished, Cooperative and Alert    Airway:  Mallampati: II   Mouth Opening: Normal  TM Distance: Normal  Tongue: Normal  Neck ROM: Normal ROM    Dental:    Chest/Lungs:  Normal Respiratory Rate    Heart:  Rate: Normal        Anesthesia Plan  Type of Anesthesia, risks & benefits discussed:    Anesthesia Type: MAC  Intra-op Monitoring Plan: Standard ASA Monitors  Post Op Pain Control Plan: multimodal analgesia and IV/PO Opioids PRN  Induction:  IV  Informed Consent: Informed consent signed with the Patient and all parties understand the risks and agree with anesthesia plan.  All questions answered.   ASA Score: 3  Day of Surgery Review of History & Physical: H&P Update referred to the surgeon/provider.    Ready For Surgery From Anesthesia Perspective.     .

## 2023-08-08 ENCOUNTER — ANESTHESIA (OUTPATIENT)
Dept: SURGERY | Facility: HOSPITAL | Age: 58
End: 2023-08-08
Payer: MEDICARE

## 2023-08-08 ENCOUNTER — HOSPITAL ENCOUNTER (OUTPATIENT)
Facility: HOSPITAL | Age: 58
Discharge: HOME | End: 2023-08-08
Attending: OPHTHALMOLOGY | Admitting: OPHTHALMOLOGY
Payer: MEDICARE

## 2023-08-08 VITALS
HEART RATE: 54 BPM | RESPIRATION RATE: 16 BRPM | OXYGEN SATURATION: 100 % | DIASTOLIC BLOOD PRESSURE: 73 MMHG | TEMPERATURE: 98 F | SYSTOLIC BLOOD PRESSURE: 134 MMHG

## 2023-08-08 DIAGNOSIS — H25.12 NUCLEAR SCLEROTIC CATARACT OF LEFT EYE: Primary | ICD-10-CM

## 2023-08-08 DIAGNOSIS — E66.01 MORBID OBESITY: ICD-10-CM

## 2023-08-08 PROCEDURE — 66984 PR REMOVAL, CATARACT, W/INSRT INTRAOC LENS, W/O ENDO CYCLO: ICD-10-PCS | Mod: LT,,, | Performed by: OPHTHALMOLOGY

## 2023-08-08 PROCEDURE — 63600175 PHARM REV CODE 636 W HCPCS: Performed by: NURSE ANESTHETIST, CERTIFIED REGISTERED

## 2023-08-08 PROCEDURE — D9220A PRA ANESTHESIA: ICD-10-PCS | Mod: ,,, | Performed by: NURSE ANESTHETIST, CERTIFIED REGISTERED

## 2023-08-08 PROCEDURE — 37000009 HC ANESTHESIA EA ADD 15 MINS: Performed by: OPHTHALMOLOGY

## 2023-08-08 PROCEDURE — 36000707: Performed by: OPHTHALMOLOGY

## 2023-08-08 PROCEDURE — 36000706: Performed by: OPHTHALMOLOGY

## 2023-08-08 PROCEDURE — 25000003 PHARM REV CODE 250: Performed by: OPHTHALMOLOGY

## 2023-08-08 PROCEDURE — 37000008 HC ANESTHESIA 1ST 15 MINUTES: Performed by: OPHTHALMOLOGY

## 2023-08-08 PROCEDURE — 94761 N-INVAS EAR/PLS OXIMETRY MLT: CPT

## 2023-08-08 PROCEDURE — 99900035 HC TECH TIME PER 15 MIN (STAT)

## 2023-08-08 PROCEDURE — 63600175 PHARM REV CODE 636 W HCPCS: Performed by: OPHTHALMOLOGY

## 2023-08-08 PROCEDURE — 71000015 HC POSTOP RECOV 1ST HR: Performed by: OPHTHALMOLOGY

## 2023-08-08 PROCEDURE — 66984 XCAPSL CTRC RMVL W/O ECP: CPT | Mod: LT,,, | Performed by: OPHTHALMOLOGY

## 2023-08-08 PROCEDURE — D9220A PRA ANESTHESIA: Mod: ,,, | Performed by: NURSE ANESTHETIST, CERTIFIED REGISTERED

## 2023-08-08 PROCEDURE — 82962 GLUCOSE BLOOD TEST: CPT | Performed by: OPHTHALMOLOGY

## 2023-08-08 PROCEDURE — V2632 POST CHMBR INTRAOCULAR LENS: HCPCS | Performed by: OPHTHALMOLOGY

## 2023-08-08 DEVICE — IMPLANTABLE DEVICE: Type: IMPLANTABLE DEVICE | Site: EYE | Status: FUNCTIONAL

## 2023-08-08 RX ORDER — MOXIFLOXACIN 5 MG/ML
SOLUTION/ DROPS OPHTHALMIC
Status: DISCONTINUED | OUTPATIENT
Start: 2023-08-08 | End: 2023-08-08 | Stop reason: HOSPADM

## 2023-08-08 RX ORDER — CYCLOP/TROP/PROPA/PHEN/KET/WAT 1-1-0.1%
1 DROPS (EA) OPHTHALMIC (EYE)
Status: COMPLETED | OUTPATIENT
Start: 2023-08-08 | End: 2023-08-08

## 2023-08-08 RX ORDER — LIDOCAINE HYDROCHLORIDE 40 MG/ML
INJECTION, SOLUTION RETROBULBAR
Status: DISCONTINUED | OUTPATIENT
Start: 2023-08-08 | End: 2023-08-08 | Stop reason: HOSPADM

## 2023-08-08 RX ORDER — TETRACAINE HYDROCHLORIDE 5 MG/ML
SOLUTION OPHTHALMIC
Status: DISCONTINUED | OUTPATIENT
Start: 2023-08-08 | End: 2023-08-08 | Stop reason: HOSPADM

## 2023-08-08 RX ORDER — HYDROCODONE BITARTRATE AND ACETAMINOPHEN 5; 325 MG/1; MG/1
1 TABLET ORAL EVERY 4 HOURS PRN
Status: DISCONTINUED | OUTPATIENT
Start: 2023-08-08 | End: 2023-08-08 | Stop reason: HOSPADM

## 2023-08-08 RX ORDER — MOXIFLOXACIN 5 MG/ML
1 SOLUTION/ DROPS OPHTHALMIC
Status: COMPLETED | OUTPATIENT
Start: 2023-08-08 | End: 2023-08-08

## 2023-08-08 RX ORDER — MIDAZOLAM HYDROCHLORIDE 1 MG/ML
INJECTION, SOLUTION INTRAMUSCULAR; INTRAVENOUS
Status: DISCONTINUED | OUTPATIENT
Start: 2023-08-08 | End: 2023-08-08

## 2023-08-08 RX ORDER — EPINEPHRINE 1 MG/ML
INJECTION, SOLUTION, CONCENTRATE INTRAVENOUS
Status: DISCONTINUED | OUTPATIENT
Start: 2023-08-08 | End: 2023-08-08 | Stop reason: HOSPADM

## 2023-08-08 RX ORDER — SODIUM CHLORIDE 9 MG/ML
INJECTION, SOLUTION INTRAVENOUS CONTINUOUS
Status: DISCONTINUED | OUTPATIENT
Start: 2023-08-08 | End: 2023-08-08 | Stop reason: HOSPADM

## 2023-08-08 RX ORDER — PREDNISOLONE ACETATE 10 MG/ML
SUSPENSION/ DROPS OPHTHALMIC
Status: DISCONTINUED | OUTPATIENT
Start: 2023-08-08 | End: 2023-08-08 | Stop reason: HOSPADM

## 2023-08-08 RX ORDER — ACETAMINOPHEN 325 MG/1
650 TABLET ORAL EVERY 4 HOURS PRN
Status: DISCONTINUED | OUTPATIENT
Start: 2023-08-08 | End: 2023-08-08 | Stop reason: HOSPADM

## 2023-08-08 RX ADMIN — Medication 1 DROP: at 07:08

## 2023-08-08 RX ADMIN — MOXIFLOXACIN OPHTHALMIC 1 DROP: 5 SOLUTION/ DROPS OPHTHALMIC at 07:08

## 2023-08-08 RX ADMIN — MIDAZOLAM HYDROCHLORIDE 1 MG: 1 INJECTION, SOLUTION INTRAMUSCULAR; INTRAVENOUS at 08:08

## 2023-08-08 NOTE — ANESTHESIA POSTPROCEDURE EVALUATION
Anesthesia Post Evaluation    Patient: Alivia Thomas    Procedure(s) Performed: Procedure(s) (LRB):  EXTRACTION, CATARACT, WITH IOL INSERTION (Left)    Final Anesthesia Type: MAC      Patient location during evaluation: PACU  Patient participation: Yes- Able to Participate  Level of consciousness: awake and alert  Post-procedure vital signs: reviewed and stable  Pain management: adequate  Airway patency: patent    PONV status at discharge: No PONV  Anesthetic complications: no      Cardiovascular status: stable  Respiratory status: spontaneous ventilation  Hydration status: euvolemic  Follow-up not needed.          Vitals Value Taken Time   /73 08/08/23 0926   Temp 36.6 °C (97.9 °F) 08/08/23 0906   Pulse 52 08/08/23 0927   Resp 16 08/08/23 0926   SpO2 100 % 08/08/23 0927   Vitals shown include unvalidated device data.      No case tracking events are documented in the log.      Pain/Aracely Score: Aracely Score: 10 (8/8/2023  9:15 AM)

## 2023-08-08 NOTE — BRIEF OP NOTE
[facility]  Brief Operative Note    Surgery Date: 8/8/2023     Surgeon(s) and Role:     * Justin Block MD - Primary    Assisting Surgeon: None    Pre-op Diagnosis:  NS (nuclear sclerosis), left [H25.12]    Post-op Diagnosis:  Post-Op Diagnosis Codes:     * NS (nuclear sclerosis), left [H25.12]    Procedure(s) (LRB):  EXTRACTION, CATARACT, WITH IOL INSERTION (Left)    Anesthesia: Local MAC    Operative Findings:     Estimated Blood Loss: * No values recorded between 8/8/2023  8:43 AM and 8/8/2023  9:05 AM *         Specimens:   Specimen (24h ago, onward)      None              Discharge Note    OUTCOME: Patient tolerated treatment/procedure well without complication and is now ready for discharge.    DISPOSITION: Home or Self Care    FINAL DIAGNOSIS:  Nuclear sclerotic cataract of left eye    FOLLOWUP: In clinic    DISCHARGE INSTRUCTIONS:    Discharge Procedure Orders   Other restrictions (specify):   Order Comments: No heavy lifting or bending for 1 week.

## 2023-08-08 NOTE — TRANSFER OF CARE
Anesthesia Transfer of Care Note    Patient: Alivia Thomas    Procedure(s) Performed: Procedure(s) (LRB):  EXTRACTION, CATARACT, WITH IOL INSERTION (Left)    Patient location: PACU    Anesthesia Type: MAC    Transport from OR: Transported from OR on room air with adequate spontaneous ventilation    Post pain: adequate analgesia    Post assessment: no apparent anesthetic complications    Post vital signs: stable    Level of consciousness: awake, alert and oriented    Nausea/Vomiting: no nausea/vomiting    Complications: none    Transfer of care protocol was followed      Last vitals:   Visit Vitals  BP (!) 152/85 (BP Location: Right arm, Patient Position: Sitting)   Pulse (!) 57   Temp 36.2 °C (97.2 °F) (Oral)   Resp 18   LMP 06/26/2018   SpO2 100%   Breastfeeding No

## 2023-08-08 NOTE — OP NOTE
Operative Date:  08/08/2023    Discharge Date:  08/08/2023    Discharge Patient Home    Report Title: Operative Note      SURGEON: Justin Block MD     ASSISTANT:     PREOPERATIVE DIAGNOSIS: Visually significant NSC cataract,  Left Eye.    POSTOPERATIVE DIAGNOSIS: Visually-significant NSC cataract,  Left Eye.    PROCEDURE PERFORMED: Phacoemulsification of the cataract with posterior chamber intraocular lens Left Eye.    ANESTHESIA: Topical with MAC     COMPLICATIONS: None    ESTIMATED BLOOD LOSS: Minimal    INDICATIONS FOR PROCEDURE:   The patient is a pleasant 57 year old woman with increasing difficulties with activities of daily living secondary to a dense visually significant cataract in the Left Eye.  Discussions have been carried out with this patient concerning the options to surgery, risks, benefits and expectations.  The patient voiced good understanding and wished to proceed with the above procedure.    PROCEDURE IN DETAIL: The patient was brought to the operating room and received topical anesthetic to the eye and then was prepped and draped in the usual sterile fashion.  Using the Gonzalez ring and the guarded tori blade set at 0.37 mm, a partial thickness clear cornea incision was made temporally.  The paracentesis site was made at the six o'clock position.  Omidria was injected into the anterior chamber through the paracentesis.  Viscoat was then injected into the anterior chamber.  The eye was then reentered at the primary surgical site with a 2.4 mm keratome followed by continuous capsulotomy, hydrodissection, hydrodelineation and phacoemulsification of the cataract.  Residual cortical material was removed using automated irrigation-aspiration technique.  Healon was injected into the posterior chamber and a CNAOTO 11.0 diopter lens was placed in the bag without difficulty. Residual viscoelastic was removed using automated irrigation-aspiration technique. The eye was re-pressurized using  BSS solution and both the paracentesis site and the primary surgical site were demonstrated to be watertight at the end of the case with Weck--Alia manipulation.  One drop of Ofloxacin and one drop of Pred acetate 1% was applied to the Left Eye .The eye was closed, patched and a Marcus shield placed.  The patient was taken to the recovery room in good and stable condition.  The patient tolerated the procedure well.  The patient was instructed to refrain from any heavy lifting, bending, stooping or straining activities, discharged home  and to follow-up in the morning for routine postoperative care with Justin Block MD.

## 2023-08-08 NOTE — DISCHARGE INSTRUCTIONS
Cataract Post Surgery Instructions     START YOUR DROPS AS SOON AS YOU GET HOME FROM SURGERY      Instill the following drops 1 drop, three (3) times daily, every four (4) hours.      FIVE MINUTES APART FROM EACH OTHER      OFLOXACIN  ( Tan Top)    KETOROLAC  ( Gray Top)    PREDNISOLONE ACETATE  ( [place] or White Top)         RESTRICTIONS FOR SEVEN (7) DAYS FOLLOWING SURGERY     DO NOT lift anything over 10 pounds.     DO NOT bend at the waist, only at the knees.      DO NOT rub your eye.      DO NOT get any water into the eye.      DO NOT wear any makeup, lotions, or creams on/around the eye.     Wear the eye shield you were given after surgery anytime you go to sleep.  You may remove the shield while awake.           NOTE:     YOU MAY resume taking ALL your medications after surgery.     YOU MAY resume driving on your first post-operative appointment IF your vision is clear. DO NOT wear your eye shield while driving for your post operative appointments.      YOU MAY take an over-the counter pain medication such as Tylenol or Ibuprofen as needed for pain.     CALL US:  MILD DISCOMFORT IS NORMAL. HOWEVER, IF YOU EXPERIENCE SEVERE THROBBING PAIN, LOSS OF VISION, ONSET OF FLASHES AND/OR FLOATERS- CALL DR. DHILLON OFFICE: [phone]/ AFTER HOUR [phone] OR PROCEED TO THE EMERGENCY ROOM.

## 2023-08-09 ENCOUNTER — OFFICE VISIT (OUTPATIENT)
Dept: OPHTHALMOLOGY | Facility: CLINIC | Age: 58
End: 2023-08-09
Payer: MEDICARE

## 2023-08-09 DIAGNOSIS — M47.816 LUMBAR SPONDYLOSIS: ICD-10-CM

## 2023-08-09 DIAGNOSIS — G89.4 CHRONIC PAIN SYNDROME: ICD-10-CM

## 2023-08-09 DIAGNOSIS — Z98.890 POST-OPERATIVE STATE: Primary | ICD-10-CM

## 2023-08-09 PROCEDURE — 99024 PR POST-OP FOLLOW-UP VISIT: ICD-10-PCS | Mod: POP,,, | Performed by: OPHTHALMOLOGY

## 2023-08-09 PROCEDURE — 99213 OFFICE O/P EST LOW 20 MIN: CPT | Mod: PBBFAC | Performed by: OPHTHALMOLOGY

## 2023-08-09 PROCEDURE — 99024 POSTOP FOLLOW-UP VISIT: CPT | Mod: POP,,, | Performed by: OPHTHALMOLOGY

## 2023-08-09 PROCEDURE — 99999 PR PBB SHADOW E&M-EST. PATIENT-LVL III: CPT | Mod: PBBFAC,,, | Performed by: OPHTHALMOLOGY

## 2023-08-09 PROCEDURE — 99999 PR PBB SHADOW E&M-EST. PATIENT-LVL III: ICD-10-PCS | Mod: PBBFAC,,, | Performed by: OPHTHALMOLOGY

## 2023-08-09 RX ORDER — OXYCODONE AND ACETAMINOPHEN 10; 325 MG/1; MG/1
1 TABLET ORAL EVERY 8 HOURS PRN
Qty: 90 TABLET | Refills: 0 | Status: SHIPPED | OUTPATIENT
Start: 2023-08-09 | End: 2023-09-13 | Stop reason: SDUPTHER

## 2023-08-09 NOTE — TELEPHONE ENCOUNTER
Patient requesting refill on oxyCODONE-acetaminophen (PERCOCET)    Last office visit 07/10/2023   shows last refill on 07/12/2023  Patient does not have a pain contract on file with [facility] department  Patient last UDS 11/22/2022 was consistent with current therapy      CODEINE  Not Detected  Not Detected   Not Detected   Not Detected  Not Detected    MORPHINE  Not Detected  Not Detected   Not Detected   Not Detected  Not Detected    6-ACETYLMORPHINE  Not Detected  Not Detected   Not Detected   Not Detected  Not Detected    OXYCODONE  Present  Present   Not Detected   Not Detected  Present    NOROYXCODONE  Present  Present   Present   Present  Present    OXYMORPHONE  Present  Present   Not Detected   Not Detected  Not Detected    NOROXYMORPHONE  Not Detected  Not Detected   Not Detected   Not Detected  Not Detected    HYDROCODONE  Not Detected  Not Detected   Not Detected   Not Detected  Not Detected    NORHYDROCODONE  Not Detected  Not Detected   Not Detected   Not Detected  Not Detected    HYDROMORPHONE  Not Detected  Not Detected   Not Detected   Not Detected  Not Detected    BUPRENORPHINE  Not Detected  Not Detected   Not Detected   Not Detected  Not Detected    NORUBPRENORPHINE  Not Detected  Not Detected   Not Detected   Not Detected  Not Detected    FENTANYL  Not Detected  Not Detected   Not Detected   Not Detected  Not Detected    NORFENTANYL  Not Detected  Not Detected   Not Detected   Not Detected  Not Detected    MEPERIDINE METABOLITE  Not Detected  Not Detected   Not Detected   Not Detected  Not Detected    TAPENTADOL  Not Detected  Not Detected   Not Detected   Not Detected  Not Detected    TAPENTADOL-O-SULF  Not Detected  Not Detected   Not Detected   Not Detected  Not Detected    METHADONE  Not Detected  Not Detected   Not Detected   Not Detected  Not Detected    TRAMADOL  Not Detected  Not Detected   Not Detected   Not Detected  Not Detected    AMPHETAMINE  Not  Detected  Not Detected   Not Detected   Not Detected  Not Detected    METHAMPHETAMINE  Not Detected  Not Detected   Not Detected   Not Detected  Not Detected    MDMA- ECSTASY  Not Detected  Not Detected   Not Detected   Not Detected  Not Detected    MDA  Not Detected  Not Detected   Not Detected   Not Detected  Not Detected    MDEA- Kait  Not Detected  Not Detected   Not Detected   Not Detected  Not Detected    METHYLPHENIDATE  Not Detected  Not Detected   Not Detected   Not Detected  Not Detected    PHENTERMINE  Not Detected  Not Detected   Not Detected   Not Detected  Not Detected    BENZOYLECGONINE  Not Detected  Not Detected   Not Detected   Not Detected  Not Detected    ALPRAZOLAM  Not Detected  Not Detected   Not Detected   Not Detected  Not Detected    ALPHA-OH-ALPRAZOLAM  Not Detected  Not Detected   Not Detected   Not Detected  Not Detected    CLONAZEPAM  Not Detected  Not Detected   Not Detected   Not Detected  Not Detected    7-AMINOCLONAZEPAM  Not Detected  Not Detected   Not Detected   Not Detected  Not Detected    DIAZEPAM  Not Detected  Not Detected   Not Detected   Not Detected  Not Detected    NORDIAZEPAM  Not Detected  Not Detected   Not Detected   Not Detected  Not Detected    OXAZEPAM  Not Detected  Not Detected   Not Detected   Not Detected  Not Detected    TEMAZEPAM  Not Detected  Not Detected   Not Detected   Not Detected  Not Detected    Lorazepam  Not Detected  Not Detected   Not Detected   Not Detected  Not Detected    MIDAZOLAM  Not Detected  Not Detected   Not Detected   Not Detected  Not Detected    ZOLPIDEM  Not Detected  Not Detected   Not Detected   Not Detected  Not Detected    BARBITURATES  Not Detected  Not Detected   Not Detected   Not Detected  Not Detected    Creatinine, Urine 20.0 - 400.0 mg/dL 112.3  93.0  86.0 R, CM  66.1  113.0 R, CM  91.8  163.4    ETHYL GLUCURONIDE  Not Detected  Not Detected   Not Detected   Not Detected  Not Detected    MARIJUANA METABOLITE  Not Detected   Not Detected   Not Detected   Not Detected  Not Detected    PCP  Not Detected  Not Detected   Not Detected   Not Detected  Not Detected    CARISOPRODOL  Not Detected  Not Detected CM   Not Detected CM   Not Detected CM  Not Detected CM    Comment: The carisoprodol immunoassay has cross-reactivity to   carisoprodol and meprobamate.    Naloxone  Not Detected  Not Detected         Gabapentin  Not Detected  Not Detected         Pregabalin  Not Detected  Not Detected         Alpha-OH-Midazolam  Not Detected  Not Detected         Zolpidem Metabolite  Not Detected  Not Detected         [facility] DRUG PANEL  See Below  See Below CM   See Below CM   See Below CM  See Below

## 2023-08-09 NOTE — PROGRESS NOTES
HPI     post op  visit today  OS      Additional comments: POST OP  OS     HX  OF CELLULTIS            Comments    Dls     03/14/23  Dm   LAST BS  106  LAST A1C  UNSURE   NS OD         EYE MEDS   OFLOXACIN  QID  OS   PRED F QID OS   KETOROLAC  QID  OS     PT C/O ITCHY SENSATION IN OS           Last edited by Ann-Marie Lucio on 8/9/2023  8:22 AM.            Assessment /Plan     For exam results, see Encounter Report.    Post-operative state        S/p CE OS- Doing well.       CPM OS.  RTC 1 wk.

## 2023-08-09 NOTE — PROGRESS NOTES
HPI     post op  visit today  OS      Additional comments: POST OP  OS     HX  OF CELLULTIS           Last edited by Ann-Marie Lucio on 8/9/2023  8:03 AM.            Assessment /Plan     For exam results, see Encounter Report.    There are no diagnoses linked to this encounter.

## 2023-08-10 DIAGNOSIS — K21.9 GASTROESOPHAGEAL REFLUX DISEASE WITHOUT ESOPHAGITIS: ICD-10-CM

## 2023-08-10 DIAGNOSIS — M79.671 RIGHT FOOT PAIN: ICD-10-CM

## 2023-08-10 RX ORDER — ATORVASTATIN CALCIUM 20 MG/1
20 TABLET, FILM COATED ORAL DAILY
Qty: 90 TABLET | Refills: 2 | Status: ON HOLD | OUTPATIENT
Start: 2023-08-10 | End: 2024-01-06 | Stop reason: HOSPADM

## 2023-08-10 RX ORDER — OMEPRAZOLE 20 MG/1
20 CAPSULE, DELAYED RELEASE ORAL EVERY MORNING
Qty: 90 CAPSULE | Refills: 2 | Status: SHIPPED | OUTPATIENT
Start: 2023-08-10 | End: 2024-03-19 | Stop reason: SDUPTHER

## 2023-08-10 RX ORDER — LIDOCAINE 50 MG/G
OINTMENT TOPICAL
Qty: 35.44 G | Refills: 6 | Status: SHIPPED | OUTPATIENT
Start: 2023-08-10

## 2023-08-10 NOTE — TELEPHONE ENCOUNTER
No care due was identified.  [facility] Embedded Care Due Messages. Reference number: [id number].   8/10/2023 12:37:23 PM CDT

## 2023-08-10 NOTE — TELEPHONE ENCOUNTER
Refill Decision Note   Alivia [facility] Benito  is requesting a refill authorization.  Brief Assessment and Rationale for Refill:  Approve     Medication Therapy Plan:         Comments:     Note composed:12:46 PM 08/10/2023

## 2023-08-11 NOTE — PLAN OF CARE
Pt Nichole garcia BdBiew [phone]   PLEASE INCLUDE MORE SPECIFIC DOCUMENTATION IN YOUR PROGRESS NOTE AND DISCHARGE SUMMARY.  The documentation in this patient's medical record requires additional clarification to ensure that we accurately capture the patients diagnosis(es), treatment and/or severity of illness. Please document to the greatest level of specificity all corresponding diagnoses (either known or suspected) and/or treatment associated with the clinical information described below.

## 2023-08-14 ENCOUNTER — HOSPITAL ENCOUNTER (OUTPATIENT)
Dept: RADIOLOGY | Facility: HOSPITAL | Age: 58
Discharge: HOME | End: 2023-08-14
Attending: ORTHOPAEDIC SURGERY
Payer: MEDICARE

## 2023-08-14 ENCOUNTER — OFFICE VISIT (OUTPATIENT)
Dept: PAIN MEDICINE | Facility: CLINIC | Age: 58
End: 2023-08-14
Payer: MEDICARE

## 2023-08-14 ENCOUNTER — TELEPHONE (OUTPATIENT)
Dept: OPHTHALMOLOGY | Facility: CLINIC | Age: 58
End: 2023-08-14
Payer: MEDICARE

## 2023-08-14 VITALS
RESPIRATION RATE: 18 BRPM | TEMPERATURE: 97 F | HEIGHT: 65 IN | WEIGHT: 250.25 LBS | DIASTOLIC BLOOD PRESSURE: 87 MMHG | HEART RATE: 61 BPM | BODY MASS INDEX: 41.69 KG/M2 | SYSTOLIC BLOOD PRESSURE: 136 MMHG | OXYGEN SATURATION: 100 %

## 2023-08-14 DIAGNOSIS — M47.817 SPONDYLOSIS OF LUMBOSACRAL REGION WITHOUT MYELOPATHY OR RADICULOPATHY: ICD-10-CM

## 2023-08-14 DIAGNOSIS — M48.061 SPINAL STENOSIS OF LUMBAR REGION, UNSPECIFIED WHETHER NEUROGENIC CLAUDICATION PRESENT: ICD-10-CM

## 2023-08-14 DIAGNOSIS — M53.3 SACROILIAC JOINT PAIN: ICD-10-CM

## 2023-08-14 DIAGNOSIS — Z96.653 PRESENCE OF BOTH ARTIFICIAL KNEE JOINTS: ICD-10-CM

## 2023-08-14 DIAGNOSIS — Z79.891 ENCOUNTER FOR MONITORING OPIOID MAINTENANCE THERAPY: ICD-10-CM

## 2023-08-14 DIAGNOSIS — G89.4 CHRONIC PAIN SYNDROME: Primary | ICD-10-CM

## 2023-08-14 DIAGNOSIS — Z51.81 ENCOUNTER FOR MONITORING OPIOID MAINTENANCE THERAPY: ICD-10-CM

## 2023-08-14 DIAGNOSIS — M51.36 DDD (DEGENERATIVE DISC DISEASE), LUMBAR: ICD-10-CM

## 2023-08-14 PROCEDURE — 73721 MRI KNEE WITHOUT CONTRAST RIGHT: ICD-10-PCS | Mod: 26,RT,, | Performed by: RADIOLOGY

## 2023-08-14 PROCEDURE — 99215 OFFICE O/P EST HI 40 MIN: CPT | Mod: PBBFAC,25 | Performed by: NURSE PRACTITIONER

## 2023-08-14 PROCEDURE — 73721 MRI JNT OF LWR EXTRE W/O DYE: CPT | Mod: 26,LT,, | Performed by: RADIOLOGY

## 2023-08-14 PROCEDURE — 80326 AMPHETAMINES 5 OR MORE: CPT | Performed by: NURSE PRACTITIONER

## 2023-08-14 PROCEDURE — 73721 MRI JNT OF LWR EXTRE W/O DYE: CPT | Mod: 26,RT,, | Performed by: RADIOLOGY

## 2023-08-14 PROCEDURE — 99214 OFFICE O/P EST MOD 30 MIN: CPT | Mod: S$PBB,,, | Performed by: NURSE PRACTITIONER

## 2023-08-14 PROCEDURE — 99214 PR OFFICE/OUTPT VISIT, EST, LEVL IV, 30-39 MIN: ICD-10-PCS | Mod: S$PBB,,, | Performed by: NURSE PRACTITIONER

## 2023-08-14 PROCEDURE — 73721 MRI JNT OF LWR EXTRE W/O DYE: CPT | Mod: TC,LT

## 2023-08-14 PROCEDURE — 99999 PR PBB SHADOW E&M-EST. PATIENT-LVL V: ICD-10-PCS | Mod: PBBFAC,,, | Performed by: NURSE PRACTITIONER

## 2023-08-14 PROCEDURE — 99999 PR PBB SHADOW E&M-EST. PATIENT-LVL V: CPT | Mod: PBBFAC,,, | Performed by: NURSE PRACTITIONER

## 2023-08-14 PROCEDURE — 73721 MRI JNT OF LWR EXTRE W/O DYE: CPT | Mod: TC,RT

## 2023-08-14 PROCEDURE — 73721 MRI KNEE WITHOUT CONTRAST LEFT: ICD-10-PCS | Mod: 26,LT,, | Performed by: RADIOLOGY

## 2023-08-14 NOTE — TELEPHONE ENCOUNTER
----- Message from Tiffanie Houston sent at 8/14/2023 12:08 PM CDT -----  Regarding: Floaters  Pt called about having a floater in left eye pt has surgery with   on 8/8.    Call back- [phone]

## 2023-08-14 NOTE — PROGRESS NOTES
Chronic patient Established Note (Follow up visit)       SUBJECTIVE:    Interval History 8/14/2023:  The patient is here for follow up after procedure for back pain. She is now s/p L5/S1 IL KERI with about 50% relief. She still has lower back pain with radiation down the back of the legs, stopping at the knees. She has associated numbness to lower extremities. Her pain worsens with walking, bending and reaching upward. She has been working on home exercises and stretches without much benefit. She has some benefit with medication as needed.  She report Her pain today is 9/10.    Interval History 7/10/2023:  The patient is here today for evaluation of increased lower back pain. She has a long-standing history of back pain which is mainly axial. She has had worsened symptoms over the past couple of weeks, most severe over the past 3 days. The pain now radiates down the back of both legs with burning and numbness. She finds it difficult to stand or walk for any length of time. No bowel or bladder incontinence. No fever or malaise. Medications are helping somewhat. She continues with home PT exercises as previously taught in PT. Her pain today is 10/10.    Interval History 4/10/2023:  The patient returns to clinic today for follow up of low back and knee pain via virtual visit. She is s/p right L3,4,5 RFA on 3/6/2023. Her left side procedure was rescheduled due to illness. This is scheduled for May 1st. She reports some relief of her right sided low back pain. She continues to report left sided low back pain. She denies any radicular leg pain. She does endorse morning stiffness. She continues to perform a home exercise routine. She is taking Percocet as needed for severe pain. She denies any other health changes.     Interval History 1/30/2023:  The patient returns to clinic today for follow up of low back and knee pain. She is s/p right genicular RFA on 12/12/2022 and left genicular RFA on 1/9/2023. She reports 60%  relief of her knee pain. She reports intermittent bilateral knee pain, this is tolerable at this time. She reports increased low back pain. Her pain is constant and aching in nature. Her leg pain is much improved. Her pain is worse with moving from sitting to standing. She does endorse morning stiffness. She continues to participate in physical therapy and a home exercise routine. She continues to take Percocet as needed with benefit and without adverse effects. She denies any other health changes.     Interval History 11/22/2022:  The patient returns to clinic today for follow up of low back and knee pain. She continues to report low back pain that radiates into the posterior aspect of both legs to under her feet. Her pain is worse with moving from sitting to standing. She also endorses morning stiffness. She recently started physical therapy. She has completed one session. She continues to report bilateral knee pain. She endorses worsening pain with the cold weather. Her pain is worse with standing and walking. She continues to take Percocet as needed with benefit and without adverse effects. She denies any other health changes. Her pain today is 8/10.    Interval History 10/5/2022:  She returns for follow-up.  Her knees are doing well.  She has some discomfort but not enough to do procedures.  Her main complaint is lumbar spine pain for the past few weeks that is radiating to the dorsal aspect of both lower extremities.  She has no red flag signs/symptoms.  No new bowel or bladder symptomatology.  The pain radiates from the back down to the plantar aspect of both feet.  It is activity related.  Rest helps some but it does not resolve the pain.  She has not been in therapy for a while.  No recent imaging.  No recent weight changes.  Otherwise, no new symptomatology.  She goes regularly to her primary care physician for health maintenance.    Interval History 8/9/2022:  Alivia Thomas presents to the clinic for  a follow-up appointment for low back and knee pain. She is s/p right genicular RFA on 5/9/2022 and left genicular RFA on 5/22/2022. She reports 60% relief of her knee pain. She also had panniculectomy on 6/14/2022. She is healing well. She continues to report intermittent low back pain, worse with prolonged activity. She denies any radicular leg pain. Her pain is worse prolonged standing and walking. She continues to perform a homoe exercise routine. She continues to take Percocet as needed with benefit and without adverse effects. She denies any other health changes. Her pain today is 7/10.    Interval History 4/9/2022:  The patient returns to clinic today for follow-up bilateral L3, 4, 5 RFA last month.  She reports that she is feeling very well following the procedure and reports 60-70% pain relief.  Today, she reports that her bilateral knee pain has continued to worsen, and she would like to go ahead and repeat bilateral genicular RFA as soon as possible.  She denies any new numbness, tingling, weakness, saddle anesthesia or bowel/bladder incontinence.    Interval History 12/28/2021:  The patient returns to clinic today for follow up via virtual visit. She continues to report bilateral knee pain. This pain is worse with prolonged standing and walking. She continues to report low back and buttock pain. She denies any radicular leg pain. She continues to report a home exercise routine. She continues to report benefit with Percocet. She denies any adverse effects. She denies any other health changes.    Pain Disability Index Review:  Last 3 PDI Scores 8/14/2023 7/10/2023 11/22/2022   Pain Disability Index (PDI) 43 50 40       Pain Medications:  Percocet 10/325 mg TID PRN pain    Opioid Contract: yes     report:  Reviewed and consistent with medication use as prescribed.    Pain Procedures:   steroid inj and visco-supplementation (series of 3) in left knee- not helpful  3/31/14 Bilateral genicular nerve  blocks  2/16/16 Bilateral L2,3,4,5 MBB  3/1/16 Bilateral L2,3,4,5 MBB   4/6/16 Left L2,3,4,5 RFA- significant relief  4/20/16 Right L2,3,4,5 RFA- significant relief  10/5/16 Left L2,3,4,5 cooled RFA  10/19/16 Right L2,3,4,5 cooled RFA  4/26/17 Left L2,3,4,5 cooled RFA  5/9/17 Right L2,3,4,5 cooled RFA  12/26/2017- Left L2,3,4,5 cooled RFA  1/9/2018- Right L2,3,4,5 cooled RFA  6/26/18 Left L2,3,4,5 cooled RFA- 60% relief  7/10/18 Right L2,3,4,5 cooled RFA- 60% relief  9/28/18 TPIs- significant relief  12/27/18 Left L2,3,4,5 RFA- 70% relief  1/29/19 Right L2,3,4,5 RFA- 70% relief  6/18/19 Left L2,3,4,5 RFA- 70% relief  7/9/19 Right L2,3,4,5 RFA- 50% relief  12/19/19 Left L2,3,4,5 RFA- 80% relief  12/31/19 Right L2,3,4,5 RFA- 50% relief  3/2/2020- Right genicular nerve block- 70% relief for one month  3/12/20 Left subacromial bursa injection- 90% relief  5/18/2020- Left genicular nerve block- 70%  6/9/2020- Bilateral SI joint injections- 50% relief  10/13/2020- Right genicular RFA- 50% relief   11/3/2020- Left genicular RFA- 50% relief  12/15/2020- Left L2,3,4,5 RFA  12/29/2020- Right L2,3,4,5 RFA  4/26/2021- Left subacromial bursa'  5/11/2021- Bilateral SI joint injections   6/28/2021- Left genicular RFA  7/13/2021- Right genicular RFA  8/24/2021- Right L2,3,4,5 RFA  9/11/2021- Left L2,3,4,5 RFA  3/7/2022- Right L2,3,4,5 RFA  3/21/2022- Left L2,3,4,5 RFA  5/9/2022- Right genicular RFA  5/23/2022- Left genicular RFA  12/12/2022- Right genicular RFA  1/9/2023- Left genicular RFA  3/6/2023- Right L3,4,5 RFA  7/24/23 L5/S1 IL KERI- 50% relief    Physical Therapy/Home Exercise: yes    Imaging:   Xray Knee 3/6/2019:  FINDINGS:  Postop bilateral knee replacements.  The the the the irregularity about the left patella with soft tissue calcifications similar.  Small joint effusion.  Some irregularity of the right patella unchanged as well.     IMPRESSION:      Postop bilateral knee replacement with irregularity about the patella  as above.    MRI Cervical Spine 4/24/2018:  FINDINGS:  There is reversal of the normal cervical lordosis which could be related to patient positioning versus muscle spasm.     Cervical vertebral body heights are well maintained without evidence of acute fracture or dislocation.  Marrow signal is unremarkable.     Spinal canal contents are unremarkable.  No abnormal masses or collections.     Individual levels as detailed below:     C2-3: No significant spinal canal stenosis or neuroforaminal narrowing.     C3-4: Facet arthropathy.  No significant spinal canal stenosis or neuroforaminal narrowing.     C4-5: Facet arthropathy.  Small broad-based disc osteophyte complex.  Mild right neuroforaminal narrowing.  Mild spinal canal stenosis.     C5-6: Facet arthropathy.  Uncovertebral joint spurring.  Broad-based posterior disc osteophyte complex.  Mild right neuroforaminal narrowing.  Mild spinal canal stenosis.     C6-7: Facet arthropathy.  No significant spinal canal stenosis or neuroforaminal narrowing.     C7-T1: No significant spinal canal stenosis or neuroforaminal narrowing.     Paraspinal muscles are unremarkable.  Soft tissues are intact.     IMPRESSION:      Mild multilevel cervical spondylosis with mild spinal canal stenosis and mild right neuroforaminal narrowing at C4-5 and C5-6.    Xray Lumbar Spine 9/21/2015:  Results: AP, lateral neutral, lateral flexion , lateral extension, bilateral oblique and spot views.  The alignment of the lumbar spine demonstrates a mild levoscoliosis .  11-mm anterior listhesis of L4 relative to L5 with no translational abnormalities   seen on flexion and extension views.  The vertebral body heights  are well-maintained  , mild disk space narrowing L4-L5 and L5-S1.   Mild anterior and marginal osteophyte formation seen  throughout the lumbar spine  .  The oblique views demonstrate no   definite spondylolysis.  There is facet joint osseous hypertrophy noted at L3-L4 and  L4-L5.  IMPRESSION:         The Significant spondylosis of the lumbar spine with grade 1 anterior listhesis L4 relative to L5.  Facet joint osseous hypertrophy L3-L4 and L4-5.    Allergies:   Review of patient's allergies indicates:   Allergen Reactions    Strawberry Anaphylaxis       Current Medications:   Current Outpatient Medications   Medication Sig Dispense Refill    albuterol (VENTOLIN HFA) 90 mcg/actuation inhaler INHALE TWO PUFFS INTO LUNGS EVERY 4 TO 6 HOURS AS NEEDED FOR SHORTNESS OF BREATH AND FOR WHEEZING 18 g 1    allopurinoL (ZYLOPRIM) 300 MG tablet Take 1 tablet (300 mg total) by mouth 2 (two) times daily. 180 tablet 3    atorvastatin (LIPITOR) 20 MG tablet TAKE 1 TABLET (20 MG TOTAL) BY MOUTH ONCE DAILY. 90 tablet 2    b complex vitamins tablet Take 1 tablet by mouth every other day.       calcium citrate/vitamin D2 (ISIAH-CITRATE ORAL) Take 500 mg by mouth 2 (two) times daily.      colchicine (MITIGARE) 0.6 mg Cap Take 1 capsule (0.6 mg total) by mouth daily as needed (flare up). 90 capsule 3    cyanocobalamin (VITAMIN B-12) 1000 MCG tablet Take 100 mcg by mouth every morning.      diclofenac sodium (VOLTAREN) 1 % Gel Apply 2 g topically 4 (four) times daily as needed. To painful area on the feet. 500 g 5    FLUoxetine (PROZAC) 20 mg/5 mL (4 mg/mL) solution TAKE 5 MLS BY MOUTH ONCE DAILY. 450 mL 3    fluticasone propionate (FLONASE) 50 mcg/actuation nasal spray 1 SPRAY BY EACH NOSTRIL ROUTE 2 TIMES DAILY AS NEEDED FOR RHINITIS. 48 g 3    indomethacin (INDOCIN) 50 MG capsule TAKE 1 CAPSULE BY MOUTH 2 TIMES DAILY WITH MEALS (Patient taking differently: Take 50 mg by mouth as needed.) 30 capsule 2    ketorolac 0.5% (ACULAR) 0.5 % Drop Place 1 drop into the left eye 4 (four) times daily. 5 mL 1    LIDOcaine (XYLOCAINE) 5 % Oint ointment APPLY TOPICALLY AS NEEDED. 35.44 g 6    MULTIVIT-IRON-MIN-FOLIC ACID 3,500-18-0.4 UNIT-MG-MG ORAL CHEW Take 1 tablet by mouth 2 (two) times daily.       nystatin  (MYCOSTATIN) powder Apply topically 2 (two) times daily. 60 g 3    ofloxacin (OCUFLOX) 0.3 % ophthalmic solution Place 1 drop into the left eye 4 (four) times daily. for 10 days 5 mL 1    omeprazole (PRILOSEC) 20 MG capsule TAKE 1 CAPSULE (20 MG TOTAL) BY MOUTH EVERY MORNING. 90 capsule 2    oxybutynin (DITROPAN-XL) 5 MG TR24 TAKE 1 TABLET BY MOUTH ONCE DAILY. 90 tablet 3    oxyCODONE-acetaminophen (PERCOCET)  mg per tablet Take 1 tablet by mouth every 8 (eight) hours as needed for Pain. 90 tablet 0    prednisoLONE acetate (PRED FORTE) 1 % DrpS Place 1 drop into the left eye 4 (four) times daily. 5 mL 1    tiZANidine (ZANAFLEX) 4 MG tablet Take 1 tablet (4 mg total) by mouth every 8 (eight) hours as needed (muscle pain). 90 tablet 2    urea (CARMOL) 40 % Crea Apply topically once daily. To dry skin on the feet. 120 g 5    vitamin D 185 MG Tab Take 5,000 mg by mouth every morning.        No current facility-administered medications for this visit.     Facility-Administered Medications Ordered in Other Visits   Medication Dose Route Frequency Provider Last Rate Last Admin    0.9%  NaCl infusion   Intravenous Continuous Lina Wagner MD 25 mL/hr at 12/15/20 1506 25 mL/hr at 12/15/20 1506    0.9%  NaCl infusion   Intravenous Continuous Ciro Chung MD           REVIEW OF SYSTEMS:    GENERAL:  No weight loss, malaise or fevers.  HEENT:  Negative for frequent or significant headaches.  NECK:  Negative for lumps, goiter, pain and significant neck swelling.  RESPIRATORY:  Negative for cough, wheezing or shortness of breath.  CARDIOVASCULAR:  Negative for chest pain, leg swelling or palpitations. HTN  GI:  Negative for abdominal discomfort, blood in stools or black stools or change in bowel habits. GERD  MUSCULOSKELETAL:  See HPI.  SKIN:  Negative for lesions, rash, and itching.  PSYCH:  Negative for sleep disturbance, mood disorder and recent psychosocial stressors.  HEMATOLOGY/LYMPHOLOGY:  Negative for  prolonged bleeding, bruising easily or swollen nodes.  NEURO:   No history of headaches, syncope, paralysis, seizures or tremors.  ENDO: Diabetes  All other reviewed and negative other than HPI.    Past Medical History:  Past Medical History:   Diagnosis Date    Allergy     Anemia     Asthma     Bilateral shoulder bursitis     Cervical stenosis of spine     Chronic pain     DDD (degenerative disc disease), cervical     Depression 01/28/2019    Diabetes mellitus type II     DJD (degenerative joint disease) of knee 06/19/2014    Facet arthritis of lumbar region 12/17/2015    Facet syndrome 12/17/2015    GERD (gastroesophageal reflux disease)     Heartburn     Hyperlipidemia     Hypertension     Morbid obesity     Neuromuscular disorder     Nuclear sclerotic cataract of left eye 8/8/2023    PADDY (obstructive sleep apnea)     Proteinuria     Right carpal tunnel syndrome     Sacroiliitis 06/13/2018    Sacroiliitis     Sleep apnea     Uterine fibroid        Past Surgical History:  Past Surgical History:   Procedure Laterality Date    CARPAL TUNNEL RELEASE      CARPAL TUNNEL RELEASE  1980s    left    CARPAL TUNNEL RELEASE  2012    right    CATARACT EXTRACTION W/  INTRAOCULAR LENS IMPLANT Left 8/8/2023    Procedure: EXTRACTION, CATARACT, WITH IOL INSERTION;  Surgeon: Justin Block MD;  Location: [facility] OR;  Service: Ophthalmology;  Laterality: Left;    COLONOSCOPY N/A 6/15/2020    Procedure: COLONOSCOPY;  Surgeon: Jc Koo MD;  Location: [facility] ([address] [place] [address]);  Service: Endoscopy;  Laterality: N/A;  COVID screening on 6/13/20 at [facility]  pt updated on drop off location and no visitor policy-    EPIDURAL STEROID INJECTION N/A 7/24/2023    Procedure: INJECTION, STEROID, EPIDURAL, L5/S1 SOONER DATE;  Surgeon: Israel Hurtado MD;  Location: [facility] PAIN MGT;  Service: Pain Management;  Laterality: N/A;    ESOPHAGOGASTRODUODENOSCOPY N/A 3/27/2019    Procedure: EGD (ESOPHAGOGASTRODUODENOSCOPY);  Surgeon: Robbin Anna  MD Dipika;  Location: [facility] ENDO (2ND FLR);  Service: Endoscopy;  Laterality: N/A;  BMI 61.3/2nd floor case/svn    INJECTION OF JOINT Bilateral 6/9/2020    Procedure: INJECTION, JOINT, BILATERAL SI;  Surgeon: Lina Wagner MD;  Location: [facility] PAIN MGT;  Service: Pain Management;  Laterality: Bilateral;  B/L SI Joint Injection    INJECTION OF JOINT Bilateral 5/11/2021    Procedure: INJECTION, JOINT, SACROILIAC (SI) need consent;  Surgeon: Lina Wagner MD;  Location: [facility] PAIN MGT;  Service: Pain Management;  Laterality: Bilateral;    JOINT REPLACEMENT Bilateral     with 2 revisions on rt    KNEE SURGERY  3/2010    orthroscope    KNEE SURGERY  6-19-14    left TKR    LAPAROSCOPIC SLEEVE GASTRECTOMY N/A 8/28/2019    Procedure: GASTRECTOMY, SLEEVE, LAPAROSCOPIC with intraop EGD;  Surgeon: Heriberto Clements MD;  Location: [facility] OR 2ND FLR;  Service: General;  Laterality: N/A;    PANNICULECTOMY N/A 6/14/2022    Procedure: PANNICULECTOMY;  Surgeon: Rhett Gray MD;  [facility];  Service: Plastics;  Laterality: N/A;    RADIOFREQUENCY ABLATION Right 7/9/2019    Procedure: RADIOFREQUENCY ABLATION;  Surgeon: Lina Wagner MD;  Location: [facility] PAIN MGT;  Service: Pain Management;  Laterality: Right;  RIGHT RFA L2,3,4,5  2 of 2    RADIOFREQUENCY ABLATION Left 12/19/2019    Procedure: RADIOFREQUENCY ABLATION, LEFT L2-L3-L4-L5 MEDIAL BRANCH 1 OF 2;  Surgeon: Lina Wagner MD;  Location: [facility] PAIN MGT;  Service: Pain Management;  Laterality: Left;    RADIOFREQUENCY ABLATION Right 12/31/2019    Procedure: RADIOFREQUENCY ABLATION, RIGHT L2-L3-L4-L5  2 OF 2;  Surgeon: Lina Wagner MD;  Location: [facility] PAIN MGT;  Service: Pain Management;  Laterality: Right;    RADIOFREQUENCY ABLATION Right 10/13/2020    Procedure: RADIOFREQUENCY ABLATION, Genicular Cooled 1 of 2;  Surgeon: Lina Wagner MD;  Location: [facility] PAIN MGT;  Service: Pain Management;  Laterality: Right;     RADIOFREQUENCY ABLATION Left 11/3/2020    Procedure: RADIOFREQUENCY ABLATION, GENICULAR COOLED  2 OF 2;  Surgeon: Lina Wagner MD;  Location: [facility] PAIN MGT;  Service: Pain Management;  Laterality: Left;    RADIOFREQUENCY ABLATION Left 12/15/2020    Procedure: RADIOFREQUENCY ABLATION LEFT L2,3,4,5 1 of 2;  Surgeon: Lina Wagner MD;  Location: [facility] PAIN MGT;  Service: Pain Management;  Laterality: Left;    RADIOFREQUENCY ABLATION Right 12/29/2020    Procedure: RADIOFREQUENCY ABLATION RIGHT L2,3,4,5 2 of 2;  Surgeon: Lina Wagner MD;  Location: [facility] PAIN MGT;  Service: Pain Management;  Laterality: Right;    RADIOFREQUENCY ABLATION Left 6/29/2021    Procedure: RADIOFREQUENCY ABLATION GENICULAR COOLED;  Surgeon: Lina Wagner MD;  Location: [facility] PAIN MGT;  Service: Pain Management;  Laterality: Left;  1 of 2    RADIOFREQUENCY ABLATION Right 7/13/2021    Procedure: RADIOFREQUENCY ABLATION GENICULAR;  Surgeon: Lina Wagner MD;  Location: [facility] PAIN MGT;  Service: Pain Management;  Laterality: Right;  2 of 2    RADIOFREQUENCY ABLATION Right 8/24/2021    Procedure: RADIOFREQUENCY ABLATION, L2-L3-L4-L5 MEDIAL BRANCH 1 OF 2;  Surgeon: Lina Wagner MD;  Location: [facility] PAIN MGT;  Service: Pain Management;  Laterality: Right;    RADIOFREQUENCY ABLATION Left 9/11/2021    Procedure: RADIOFREQUENCY ABLATION, L2-L3-L4-L5 MEDIAL BR ANCH 2 OF 2;  Surgeon: Lina Wagner MD;  Location: [facility] PAIN MGT;  Service: Pain Management;  Laterality: Left;    RADIOFREQUENCY ABLATION Right 3/7/2022    Procedure: RADIOFREQUENCY ABLATION RIGHT L3,4,5 1 of 2, needs consent;  Surgeon: Israel Hurtado MD;  Location: [facility] PAIN MGT;  Service: Pain Management;  Laterality: Right;    RADIOFREQUENCY ABLATION Left 3/21/2022    Procedure: RADIOFREQUENCY ABLATION RIGHT L3,4,5 2 of 2, needs consent;  Surgeon: Israel Hurtado MD;  Location: [facility] PAIN MGT;  Service: Pain Management;  Laterality: Left;    RADIOFREQUENCY ABLATION  Right 5/9/2022    Procedure: RADIOFREQUENCY ABLATION RIGHT GENICULAR COOLED 1 of 2;  Surgeon: Israel Hurtado MD;  Location: [facility] PAIN MGT;  Service: Pain Management;  Laterality: Right;    RADIOFREQUENCY ABLATION Left 5/23/2022    Procedure: RADIOFREQUENCY ABLATION LEFT GENICULAR COOLED  2 of 2;  Surgeon: Israel Hurtado MD;  Location: [facility] PAIN MGT;  Service: Pain Management;  Laterality: Left;    RADIOFREQUENCY ABLATION Right 12/12/2022    Procedure: RADIOFREQUENCY ABLATION RIGHT GENICULAR COOLED  ONE OF TWO  NEEDS CONSENT;  Surgeon: Israel Hurtado MD;  Location: [facility] PAIN MGT;  Service: Pain Management;  Laterality: Right;    RADIOFREQUENCY ABLATION Left 1/9/2023    Procedure: RADIOFREQUENCY ABLATION LEFT GENICULAR COOLED  TWO OF TWO NEEDS CONSENT;  Surgeon: Israel Hurtado MD;  Location: [facility] PAIN MGT;  Service: Pain Management;  Laterality: Left;    RADIOFREQUENCY ABLATION Right 3/6/2023    Procedure: RADIOFREQUENCY ABLATION RIGHT L3,L4,L5;  Surgeon: Israel Hurtado MD;  [facility];  Service: Pain Management;  Laterality: Right;    RADIOFREQUENCY ABLATION Left 5/22/2023    Procedure: RADIOFREQUENCY ABLATION LEFT L3,L4,L5;  Surgeon: Israel Hurtado MD;  Location: [facility] PAIN MGT;  Service: Pain Management;  Laterality: Left;  4/3 LM TO R/S    RADIOFREQUENCY ABLATION OF LUMBAR MEDIAL BRANCH NERVE AT SINGLE LEVEL Left 6/26/2018    Procedure: RADIOFREQUENCY ABLATION, NERVE, MEDIAL BRANCH, LUMBAR, 1 LEVEL;  Surgeon: Lina Wagner MD;  Location: [facility] PAIN MGT;  Service: Pain Management;  Laterality: Left;  Left Cooled RFA @ L2,3,4,5  [id number]  with Sedation    1 of 2    RADIOFREQUENCY ABLATION OF LUMBAR MEDIAL BRANCH NERVE AT SINGLE LEVEL Right 7/10/2018    Procedure: RADIOFREQUENCY ABLATION, NERVE, MEDIAL BRANCH, LUMBAR, 1 LEVEL;  Surgeon: Lina Wagner MD;  Location: [facility] PAIN MGT;  Service: Pain Management;  Laterality: Right;  RIght Cooled RFA @ L2,3,4,5  [id number] with Sedation    2 of 2    TRIGGER  FINGER RELEASE Right 2017    TRIGGER FINGER RELEASE Left 7/29/2019    Procedure: RELEASE, TRIGGER FINGER, Left Thumb;  Surgeon: Velma Garcia MD;  Location: [facility];  Service: Orthopedics;  Laterality: Left;  Local w/ MAC       Family History:  Family History   Problem Relation Age of Onset    Diabetes Mother     Cataracts Mother     Diabetes Father     Cataracts Father     Hypertension Sister     Diabetes Sister     No Known Problems Sister     Cancer Sister         lymphoma     Coronary artery disease Brother     Diabetes Brother     Diabetes Brother     Hypotension Brother     Kidney failure Brother     Hypotension Brother     Amblyopia Neg Hx     Blindness Neg Hx     Glaucoma Neg Hx     Macular degeneration Neg Hx     Retinal detachment Neg Hx     Strabismus Neg Hx     Stroke Neg Hx     Thyroid disease Neg Hx     Anesthesia problems Neg Hx        Social History:  Social History     Socioeconomic History    Marital status:    Tobacco Use    Smoking status: Never    Smokeless tobacco: Never   Substance and Sexual Activity    Alcohol use: Not Currently     Comment: occasionally     Drug use: No    Sexual activity: Yes     Partners: Female     Birth control/protection: None   Social History Narrative    Disabled. The patient is the youngest of 6 siblings. Single. Lives with single-sex partner.                  Social Determinants of Health     Financial Resource Strain: Unknown (6/12/2023)    Overall Financial Resource Strain (CARDIA)     Difficulty of Paying Living Expenses: Patient refused   Food Insecurity: No Food Insecurity (6/12/2023)    Hunger Vital Sign     Worried About Running Out of Food in the Last Year: Never true     Ran Out of Food in the Last Year: Never true   Transportation Needs: No Transportation Needs (6/12/2023)    PRAPARE - Transportation     Lack of Transportation (Medical): No     Lack of Transportation (Non-Medical): No   Physical Activity: Unknown (6/12/2023)    Exercise  "Vital Sign     Days of Exercise per Week: Patient refused     Minutes of Exercise per Session: Patient refused   Stress: Stress Concern Present (6/12/2023)    [employer] [place] [employer] - Occupational Stress Questionnaire     Feeling of Stress : Very much   Social Connections: Unknown (6/12/2023)    Social Connection and Isolation Panel [NHANES]     Frequency of Communication with Friends and Family: Twice a week     Frequency of Social Gatherings with Friends and Family: Never     Attends [employer] Services: More than 4 times per year     Active Member of Clubs or Organizations: No     Attends Club or Organization Meetings: Never     Marital Status: Patient refused   Housing Stability: Unknown (6/12/2023)    Housing Stability Vital Sign     Unable to Pay for Housing in the Last Year: Patient refused     Unstable Housing in the Last Year: Patient refused       OBJECTIVE:    VITALS:    /87 (BP Location: Left arm, Patient Position: Sitting, BP Method: Large (Automatic))   Pulse 61   Temp 97 °F (36.1 °C) (Oral)   Resp 18   Ht 5' 5" (1.651 m)   Wt 113.5 kg (250 lb 3.6 oz)   LMP 06/26/2018   SpO2 100%   BMI 41.64 kg/m²     PHYSICAL EXAMINATION:    GENERAL: Well appearing, in no acute distress, alert and oriented x3.   PSYCH:  Mood and affect appropriate.  SKIN: Skin color, texture, turgor normal, no rashes or lesions.   HEAD/FACE:  Normocephalic, atraumatic.   CV: RRR with palpation of the radial artery.  PULM: No evidence of respiratory difficulty, symmetric chest rise.  BACK: Negative SLR bilaterally. SLR is positive bilaterally at L5 distribution. There is pain with palpation over lumbar facet joints and paraspinals bilaterally. Limited ROM with pain on flexion and extension.  Positive facet loading bilaterally  EXTREMITIES: There is pain with palpation to bilateral SI joints.  JENNA is positive on the right.   MUSCULOSKELETAL: 4/5 strength in right ankle with plantar and dorsiflexion. 4/5 " strength in left ankle with plantar and dorsiflexion. 5/5 strength with right knee flexion and extension. 5/5 strength with left knee flexion and extension. 5/5 strength in right EHL, 5/5 strength in left EHL.  NEURO:  Plantar response are downgoing. No clonus. Decreased sensation at a bilateral L5 and S1 distribution bilaterally.  GAIT: Antalgic- ambulates without assistance.      ASSESSMENT: 57 y.o. year old female with low back and knee pain, consistent with the followin. Chronic pain syndrome        2. DDD (degenerative disc disease), lumbar  Ambulatory referral/consult to Physical/Occupational Therapy      3. Spinal stenosis of lumbar region, unspecified whether neurogenic claudication present  Ambulatory referral/consult to Physical/Occupational Therapy      4. Sacroiliac joint pain        5. Spondylosis of lumbosacral region without myelopathy or radiculopathy  Ambulatory referral/consult to Physical/Occupational Therapy      6. Encounter for monitoring opioid maintenance therapy  Pain Clinic Drug Screen            PLAN:     - Previous imaging reviewed today.    - She had some benefit with L5/S1 IL KERI.    - Will start her in PT.    - Continue Percocet 10/325 mg TID PRN.      - The patient is here today for a refill of current pain medications and they believe these provide effective pain control and improvements in quality of life.  The patient notes no serious side effects, and feels the benefits outweigh the risks.  The patient was reminded of the pain contract that they signed previously as well as the risks and benefits of the medication including possible death.  The updated [place] Board of Pharmacy prescription monitoring program was reviewed, and the patient has been found to be compliant with current treatment plan.     - Previous UDS from 2022 reviewed and consistent. Repeat today.    - I have stressed the importance of physical activity and a home exercise plan to help with pain  and improve health.    - RTC in 2 months or sooner if needed.     - Counseled patient regarding the importance of activity modification and constant sleeping habits.    - Dr. Hurtado was consulted on the patient and agrees with this plan.      The above plan and management options were discussed at length with patient. Patient is in agreement with the above and verbalized understanding.    Virginia Sanchez  08/14/2023

## 2023-08-15 ENCOUNTER — TELEPHONE (OUTPATIENT)
Dept: ORTHOPEDICS | Facility: CLINIC | Age: 58
End: 2023-08-15
Payer: MEDICARE

## 2023-08-15 NOTE — TELEPHONE ENCOUNTER
Patient returned call and results given. Patient verbalized understanding. Appt date/time given for f/u, patient acknowledged.

## 2023-08-15 NOTE — TELEPHONE ENCOUNTER
Left vm for pt to call back to schedule f/u visit and discuss MRI results.  ----- Message from Theodore Swan MD sent at 8/15/2023  8:32 AM CDT -----  Zmri showed some fragmentation of the knee cap, scar tissue and some bone cysts.  Schedule f/u

## 2023-08-15 NOTE — TELEPHONE ENCOUNTER
Called patient, no answer. Left message on voicemail to return call to clinic.    ----- Message from Theodore Swan MD sent at 8/15/2023  8:32 AM CDT -----  Zmri showed some fragmentation of the knee cap, scar tissue and some bone cysts.  Schedule f/u

## 2023-08-16 ENCOUNTER — OFFICE VISIT (OUTPATIENT)
Dept: OPHTHALMOLOGY | Facility: CLINIC | Age: 58
End: 2023-08-16
Payer: MEDICARE

## 2023-08-16 DIAGNOSIS — Z98.890 POST-OPERATIVE STATE: Primary | ICD-10-CM

## 2023-08-16 DIAGNOSIS — H25.11 NS (NUCLEAR SCLEROSIS), RIGHT: ICD-10-CM

## 2023-08-16 PROCEDURE — 99999 PR PBB SHADOW E&M-EST. PATIENT-LVL III: ICD-10-PCS | Mod: PBBFAC,,, | Performed by: OPHTHALMOLOGY

## 2023-08-16 PROCEDURE — 99024 POSTOP FOLLOW-UP VISIT: CPT | Mod: POP,,, | Performed by: OPHTHALMOLOGY

## 2023-08-16 PROCEDURE — 92136 OPHTHALMIC BIOMETRY: CPT | Mod: PBBFAC | Performed by: OPHTHALMOLOGY

## 2023-08-16 PROCEDURE — 99024 PR POST-OP FOLLOW-UP VISIT: ICD-10-PCS | Mod: POP,,, | Performed by: OPHTHALMOLOGY

## 2023-08-16 PROCEDURE — 99213 OFFICE O/P EST LOW 20 MIN: CPT | Mod: PBBFAC | Performed by: OPHTHALMOLOGY

## 2023-08-16 PROCEDURE — 99999 PR PBB SHADOW E&M-EST. PATIENT-LVL III: CPT | Mod: PBBFAC,,, | Performed by: OPHTHALMOLOGY

## 2023-08-16 PROCEDURE — 92136 PR OPHTHAL BIOMETRY,INTRAOC LENS POW CALC: ICD-10-PCS | Mod: 26,S$PBB,RT, | Performed by: OPHTHALMOLOGY

## 2023-08-16 PROCEDURE — 92136 OPHTHALMIC BIOMETRY: CPT | Mod: 26,S$PBB,RT, | Performed by: OPHTHALMOLOGY

## 2023-08-16 NOTE — PROGRESS NOTES
Subjective:       Patient ID: Alivia Thomas is a 57 y.o. female.    Chief Complaint: POST OP  2 WEEK  OS  (FOLLOW  UP 2 WEEK POST OP  OS //EYE GTTS //PRED F  OS  TID /OFLOXCIN  STOPPED /KETORLAC  TID OS )    HPI     POST OP  2 WEEK  OS      Additional comments: FOLLOW  UP 2 WEEK POST OP  OS     EYE GTTS     PRED F  OS  TID   OFLOXCIN  STOPPED   KETORLAC  TID OS            Comments    DLS 8/23      Floaters in os x 2 days ago    Pt called to speak to someone about new floater           Last edited by Ann-Marie Lucio on 8/16/2023  1:11 PM.             Assessment:       1. Post-operative state    2. NS (nuclear sclerosis), right        Plan:       S/p CE OS- Doing well.     Visually significant cataract OD -Pt. Wants Sx.       Taper gtts OS.  Cataract Surgery Consent: Patient with a visually significant cataract with difficulties of ADLs, reading, driving, night vision, glare (any and all).  Discussed with Patient/Family/Caregiver: options, risks and benefits, expectations of cataract surgery, utilized an eye model with questions and answers to facilitate discussion.  Discussed lens options and patient understands that glasses may be required for optimal vision for distance and/or near vision after cataract surgery.  The Patient/Family/Caregiver  voice good understanding and patient wishes to proceed with surgery.  The patient will likely benefit from surgery and patient signed consent for Right Eye.   CE OD 8/29/23.

## 2023-08-19 LAB
6MAM UR QL: NOT DETECTED
7AMINOCLONAZEPAM UR QL: NOT DETECTED
A-OH ALPRAZ UR QL: NOT DETECTED
ALPHA-OH-MIDAZOLAM: NOT DETECTED
ALPRAZ UR QL: NOT DETECTED
AMPHET UR QL SCN: NOT DETECTED
ANNOTATION COMMENT IMP: NORMAL
ANNOTATION COMMENT IMP: NORMAL
BARBITURATES UR QL: NOT DETECTED
BUPRENORPHINE UR QL: NOT DETECTED
BZE UR QL: NOT DETECTED
CARBOXYTHC UR QL: NOT DETECTED
CARISOPRODOL UR QL: NOT DETECTED
CLONAZEPAM UR QL: NOT DETECTED
CODEINE UR QL: NOT DETECTED
CREAT UR-MCNC: 61.1 MG/DL (ref 20–400)
DIAZEPAM UR QL: NOT DETECTED
ETHYL GLUCURONIDE UR QL: NOT DETECTED
FENTANYL UR QL: NOT DETECTED
GABAPENTIN: NOT DETECTED
HYDROCODONE UR QL: NOT DETECTED
HYDROMORPHONE UR QL: NOT DETECTED
LORAZEPAM UR QL: NOT DETECTED
MDA UR QL: NOT DETECTED
MDEA UR QL: NOT DETECTED
MDMA UR QL: NOT DETECTED
ME-PHENIDATE UR QL: NOT DETECTED
METHADONE UR QL: NOT DETECTED
METHAMPHET UR QL: NOT DETECTED
MIDAZOLAM UR QL SCN: NOT DETECTED
MORPHINE UR QL: NOT DETECTED
NALOXONE: NOT DETECTED
NORBUPRENORPHINE UR QL CFM: NOT DETECTED
NORDIAZEPAM UR QL: NOT DETECTED
NORFENTANYL UR QL: NOT DETECTED
NORHYDROCODONE UR QL CFM: NOT DETECTED
NORMEPERIDINE UR QL CFM: NOT DETECTED
NOROXYCODONE UR QL CFM: PRESENT
NOROXYMORPHONE UR QL SCN: NOT DETECTED
OXAZEPAM UR QL: NOT DETECTED
OXYCODONE UR QL: PRESENT
OXYMORPHONE UR QL: PRESENT
PATHOLOGY STUDY: NORMAL
PCP UR QL: NOT DETECTED
PHENTERMINE UR QL: NOT DETECTED
PREGABALIN: NOT DETECTED
SERVICE CMNT-IMP: NORMAL
TAPENTADOL UR QL SCN: NOT DETECTED
TAPENTADOL UR QL SCN: NOT DETECTED
TEMAZEPAM UR QL: NOT DETECTED
TRAMADOL UR QL: NOT DETECTED
ZOLPIDEM METABOLITE: NOT DETECTED
ZOLPIDEM UR QL: NOT DETECTED

## 2023-08-21 ENCOUNTER — OFFICE VISIT (OUTPATIENT)
Dept: ORTHOPEDICS | Facility: CLINIC | Age: 58
End: 2023-08-21
Payer: MEDICARE

## 2023-08-21 VITALS — HEIGHT: 65 IN | WEIGHT: 254.94 LBS | BODY MASS INDEX: 42.48 KG/M2

## 2023-08-21 DIAGNOSIS — G89.29 CHRONIC PAIN OF BOTH KNEES: Primary | ICD-10-CM

## 2023-08-21 DIAGNOSIS — M25.562 CHRONIC PAIN OF BOTH KNEES: Primary | ICD-10-CM

## 2023-08-21 DIAGNOSIS — M25.561 CHRONIC PAIN OF BOTH KNEES: Primary | ICD-10-CM

## 2023-08-21 DIAGNOSIS — Z96.651 HISTORY OF REVISION OF TOTAL REPLACEMENT OF RIGHT KNEE JOINT: ICD-10-CM

## 2023-08-21 DIAGNOSIS — E66.01 MORBID OBESITY WITH BMI OF 40.0-44.9, ADULT: ICD-10-CM

## 2023-08-21 PROCEDURE — 99213 PR OFFICE/OUTPT VISIT, EST, LEVL III, 20-29 MIN: ICD-10-PCS | Mod: S$PBB,,, | Performed by: ORTHOPAEDIC SURGERY

## 2023-08-21 PROCEDURE — 99999 PR PBB SHADOW E&M-EST. PATIENT-LVL IV: ICD-10-PCS | Mod: PBBFAC,,, | Performed by: ORTHOPAEDIC SURGERY

## 2023-08-21 PROCEDURE — 99213 OFFICE O/P EST LOW 20 MIN: CPT | Mod: S$PBB,,, | Performed by: ORTHOPAEDIC SURGERY

## 2023-08-21 PROCEDURE — 99214 OFFICE O/P EST MOD 30 MIN: CPT | Mod: PBBFAC | Performed by: ORTHOPAEDIC SURGERY

## 2023-08-21 PROCEDURE — 99999 PR PBB SHADOW E&M-EST. PATIENT-LVL IV: CPT | Mod: PBBFAC,,, | Performed by: ORTHOPAEDIC SURGERY

## 2023-08-21 NOTE — PROGRESS NOTES
"Subjective:      Patient ID: Alivia Thomas is a 57 y.o. female.    Chief Complaint: Pain of the Right Knee and Pain of the Left Knee    HPI  Alivia Thomas has bilateral knee pain.  The knees both give out and feel weak.  The right knee may be slightly worse.  There has been no other significant change since she had her MRIs.          Objective:      Body mass index is 42.43 kg/m².  Vitals:    08/21/23 1523   Weight: 115.6 kg (254 lb 15.4 oz)   Height: 5' 5" (1.651 m)           General    Constitutional: She is oriented to person, place, and time. She appears well-developed and well-nourished.   HENT:   Head: Normocephalic and atraumatic.   Eyes: EOM are normal.   Cardiovascular:  Normal rate.            Pulmonary/Chest: Effort normal.   Neurological: She is alert and oriented to person, place, and time.   Psychiatric: She has a normal mood and affect. Her behavior is normal.             I discussed the MRI findings.  She has synovitis in both knees.  There was fragmentation of the patella on the left.          Assessment:       Encounter Diagnoses   Name Primary?    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  At this point we would like to try a course of physical therapy.  I will see her back in 6 weeks.              "

## 2023-08-22 ENCOUNTER — TELEPHONE (OUTPATIENT)
Dept: OPHTHALMOLOGY | Facility: CLINIC | Age: 58
End: 2023-08-22
Payer: MEDICARE

## 2023-08-22 NOTE — TELEPHONE ENCOUNTER
"----- Message from Dinorah Alex sent at 8/22/2023  2:57 PM CDT -----    ----- Message -----  From: Vincent Reyna  Sent: 8/22/2023   2:36 PM CDT  To: Umair QUINN Staff    Consult/Advisory:          Name Of Caller: Self      Contact Preference?: [phone]       Provider Name: Umair      What is the nature of the call?: Requesting confirmation of arrival time for 8/29 procedure          Additional Notes:  "Thank you for all that you do for our patients"        "

## 2023-08-22 NOTE — H&P
[facility]  History & Physical    Subjective:      Chief Complaint/Reason for Admission:     Alivia Thomas is a 57 y.o. female.    Past Medical History:   Diagnosis Date    Allergy     Anemia     Asthma     Bilateral shoulder bursitis     Cervical stenosis of spine     Chronic pain     DDD (degenerative disc disease), cervical     Depression 01/28/2019    Diabetes mellitus type II     DJD (degenerative joint disease) of knee 06/19/2014    Facet arthritis of lumbar region 12/17/2015    Facet syndrome 12/17/2015    GERD (gastroesophageal reflux disease)     Heartburn     Hyperlipidemia     Hypertension     Morbid obesity     Neuromuscular disorder     Nuclear sclerotic cataract of left eye 8/8/2023    PADDY (obstructive sleep apnea)     Proteinuria     Right carpal tunnel syndrome     Sacroiliitis 06/13/2018    Sacroiliitis     Sleep apnea     Uterine fibroid      Past Surgical History:   Procedure Laterality Date    CARPAL TUNNEL RELEASE      CARPAL TUNNEL RELEASE  1980s    left    CARPAL TUNNEL RELEASE  2012    right    CATARACT EXTRACTION W/  INTRAOCULAR LENS IMPLANT Left 8/8/2023    Procedure: EXTRACTION, CATARACT, WITH IOL INSERTION;  Surgeon: Justin Block MD;  Location: [facility] OR;  Service: Ophthalmology;  Laterality: Left;    COLONOSCOPY N/A 6/15/2020    Procedure: COLONOSCOPY;  Surgeon: Jc Koo MD;  Location: [facility] (4TH FLR);  Service: Endoscopy;  Laterality: N/A;  COVID screening on 6/13/20 at [facility]  pt updated on drop off location and no visitor policy-    EPIDURAL STEROID INJECTION N/A 7/24/2023    Procedure: INJECTION, STEROID, EPIDURAL, L5/S1 SOONER DATE;  Surgeon: Israel Hurtado MD;  Location: [facility] PAIN MGT;  Service: Pain Management;  Laterality: N/A;    ESOPHAGOGASTRODUODENOSCOPY N/A 3/27/2019    Procedure: EGD (ESOPHAGOGASTRODUODENOSCOPY);  Surgeon: Robbin Bar MD;  Location: [facility] (2ND FLR);  Service: Endoscopy;  Laterality:  N/A;  BMI 61.3/2nd floor case/svn    INJECTION OF JOINT Bilateral 6/9/2020    Procedure: INJECTION, JOINT, BILATERAL SI;  Surgeon: Lina Wagner MD;  Location: [facility] PAIN MGT;  Service: Pain Management;  Laterality: Bilateral;  B/L SI Joint Injection    INJECTION OF JOINT Bilateral 5/11/2021    Procedure: INJECTION, JOINT, SACROILIAC (SI) need consent;  Surgeon: Lina Wagner MD;  Location: [facility] PAIN MGT;  Service: Pain Management;  Laterality: Bilateral;    JOINT REPLACEMENT Bilateral     with 2 revisions on rt    KNEE SURGERY  3/2010    orthroscope    KNEE SURGERY  6-19-14    left TKR    LAPAROSCOPIC SLEEVE GASTRECTOMY N/A 8/28/2019    Procedure: GASTRECTOMY, SLEEVE, LAPAROSCOPIC with intraop EGD;  Surgeon: Heriberto Clements MD;  Location: [facility] OR [facility] FLR;  Service: General;  Laterality: N/A;    PANNICULECTOMY N/A 6/14/2022    Procedure: PANNICULECTOMY;  Surgeon: Rhett Gray MD;  [facility];  Service: Plastics;  Laterality: N/A;    RADIOFREQUENCY ABLATION Right 7/9/2019    Procedure: RADIOFREQUENCY ABLATION;  Surgeon: Lina Wagner MD;  Location: [facility] PAIN MGT;  Service: Pain Management;  Laterality: Right;  RIGHT RFA L2,3,4,5  2 of 2    RADIOFREQUENCY ABLATION Left 12/19/2019    Procedure: RADIOFREQUENCY ABLATION, LEFT L2-L3-L4-L5 MEDIAL BRANCH 1 OF 2;  Surgeon: Lina Wagner MD;  Location: [facility] PAIN MGT;  Service: Pain Management;  Laterality: Left;    RADIOFREQUENCY ABLATION Right 12/31/2019    Procedure: RADIOFREQUENCY ABLATION, RIGHT L2-L3-L4-L5  2 OF 2;  Surgeon: Lina Wagner MD;  Location: [facility] PAIN MGT;  Service: Pain Management;  Laterality: Right;    RADIOFREQUENCY ABLATION Right 10/13/2020    Procedure: RADIOFREQUENCY ABLATION, Genicular Cooled 1 of 2;  Surgeon: Lina Wagner MD;  Location: [facility] PAIN MGT;  Service: Pain Management;  Laterality: Right;    RADIOFREQUENCY ABLATION Left 11/3/2020    Procedure: RADIOFREQUENCY ABLATION, GENICULAR  COOLED  2 OF 2;  Surgeon: Lina Wagner MD;  Location: [facility] PAIN MGT;  Service: Pain Management;  Laterality: Left;    RADIOFREQUENCY ABLATION Left 12/15/2020    Procedure: RADIOFREQUENCY ABLATION LEFT L2,3,4,5 1 of 2;  Surgeon: Lina Wagner MD;  Location: [facility] PAIN MGT;  Service: Pain Management;  Laterality: Left;    RADIOFREQUENCY ABLATION Right 12/29/2020    Procedure: RADIOFREQUENCY ABLATION RIGHT L2,3,4,5 2 of 2;  Surgeon: Lina Wagner MD;  Location: [facility] PAIN MGT;  Service: Pain Management;  Laterality: Right;    RADIOFREQUENCY ABLATION Left 6/29/2021    Procedure: RADIOFREQUENCY ABLATION GENICULAR COOLED;  Surgeon: Lina Wagner MD;  Location: [facility] PAIN MGT;  Service: Pain Management;  Laterality: Left;  1 of 2    RADIOFREQUENCY ABLATION Right 7/13/2021    Procedure: RADIOFREQUENCY ABLATION GENICULAR;  Surgeon: Lina Wagner MD;  Location: [facility] PAIN MGT;  Service: Pain Management;  Laterality: Right;  2 of 2    RADIOFREQUENCY ABLATION Right 8/24/2021    Procedure: RADIOFREQUENCY ABLATION, L2-L3-L4-L5 MEDIAL BRANCH 1 OF 2;  Surgeon: Lina Wagner MD;  Location: [facility] PAIN MGT;  Service: Pain Management;  Laterality: Right;    RADIOFREQUENCY ABLATION Left 9/11/2021    Procedure: RADIOFREQUENCY ABLATION, L2-L3-L4-L5 MEDIAL BR ANCH 2 OF 2;  Surgeon: Lina Wagner MD;  Location: [facility] PAIN MGT;  Service: Pain Management;  Laterality: Left;    RADIOFREQUENCY ABLATION Right 3/7/2022    Procedure: RADIOFREQUENCY ABLATION RIGHT L3,4,5 1 of 2, needs consent;  Surgeon: Israel Hurtado MD;  Location: [facility]IN [facility]GT;  Service: Pain Management;  Laterality: Right;    RADIOFREQUENCY ABLATION Left 3/21/2022    Procedure: RADIOFREQUENCY ABLATION RIGHT L3,4,5 2 of 2, needs consent;  Surgeon: Israel Hurtado MD;  Location: [facility]IN [facility]GT;  Service: Pain Management;  Laterality: Left;    RADIOFREQUENCY ABLATION Right 5/9/2022    Procedure: RADIOFREQUENCY ABLATION RIGHT GENICULAR COOLED 1 of 2;   Surgeon: Israel Hurtado MD;  Location: [facility] PAIN MGT;  Service: Pain Management;  Laterality: Right;    RADIOFREQUENCY ABLATION Left 5/23/2022    Procedure: RADIOFREQUENCY ABLATION LEFT GENICULAR COOLED  2 of 2;  Surgeon: Israel Hurtado MD;  Location: [facility] PAIN MGT;  Service: Pain Management;  Laterality: Left;    RADIOFREQUENCY ABLATION Right 12/12/2022    Procedure: RADIOFREQUENCY ABLATION RIGHT GENICULAR COOLED  ONE OF TWO  NEEDS CONSENT;  Surgeon: Israel Hurtado MD;  Location: [facility] PAIN MGT;  Service: Pain Management;  Laterality: Right;    RADIOFREQUENCY ABLATION Left 1/9/2023    Procedure: RADIOFREQUENCY ABLATION LEFT GENICULAR COOLED  TWO OF TWO NEEDS CONSENT;  Surgeon: Israel Hurtado MD;  Location: [facility] PAIN MGT;  Service: Pain Management;  Laterality: Left;    RADIOFREQUENCY ABLATION Right 3/6/2023    Procedure: RADIOFREQUENCY ABLATION RIGHT L3,L4,L5;  Surgeon: Israel Hurtado MD;  Location: [facility] PAIN MGT;  Service: Pain Management;  Laterality: Right;    RADIOFREQUENCY ABLATION Left 5/22/2023    Procedure: RADIOFREQUENCY ABLATION LEFT L3,L4,L5;  Surgeon: Israel Hurtado MD;  Location: [facility] PAIN MGT;  Service: Pain Management;  Laterality: Left;  4/3 LM TO R/S    RADIOFREQUENCY ABLATION OF LUMBAR MEDIAL BRANCH NERVE AT SINGLE LEVEL Left 6/26/2018    Procedure: RADIOFREQUENCY ABLATION, NERVE, MEDIAL BRANCH, LUMBAR, 1 LEVEL;  Surgeon: Lina Wagner MD;  Location: [facility] PAIN MGT;  Service: Pain Management;  Laterality: Left;  Left Cooled RFA @ L2,3,4,5  [id number]  with Sedation    1 of 2    RADIOFREQUENCY ABLATION OF LUMBAR MEDIAL BRANCH NERVE AT SINGLE LEVEL Right 7/10/2018    Procedure: RADIOFREQUENCY ABLATION, NERVE, MEDIAL BRANCH, LUMBAR, 1 LEVEL;  Surgeon: Lina Wagner MD;  Location: [facility] PAIN MGT;  Service: Pain Management;  Laterality: Right;  RIght Cooled RFA @ L2,3,4,5  [id number] with Sedation    2 of 2    TRIGGER FINGER RELEASE Right 2017    TRIGGER FINGER RELEASE Left 7/29/2019    Procedure:  RELEASE, TRIGGER FINGER, Left Thumb;  Surgeon: Velma Garcia MD;  Location: [facility];  Service: Orthopedics;  Laterality: Left;  Local w/ MAC     Family History   Problem Relation Age of Onset    Diabetes Mother     Cataracts Mother     Diabetes Father     Cataracts Father     Hypertension Sister     Diabetes Sister     No Known Problems Sister     Cancer Sister         lymphoma     Coronary artery disease Brother     Diabetes Brother     Diabetes Brother     Hypotension Brother     Kidney failure Brother     Hypotension Brother     Amblyopia Neg Hx     Blindness Neg Hx     Glaucoma Neg Hx     Macular degeneration Neg Hx     Retinal detachment Neg Hx     Strabismus Neg Hx     Stroke Neg Hx     Thyroid disease Neg Hx     Anesthesia problems Neg Hx      Social History     Tobacco Use    Smoking status: Never    Smokeless tobacco: Never   Substance Use Topics    Alcohol use: Not Currently     Comment: occasionally     Drug use: No       No medications prior to admission.     Review of patient's allergies indicates:   Allergen Reactions    Strawberry Anaphylaxis        Review of Systems   Eyes:  Positive for blurred vision.   All other systems reviewed and are negative.      Objective:      Vital Signs (Most Recent)       Vital Signs Range (Last 24H):  BP: ()/()   Arterial Line BP: ()/()     Physical Exam  Constitutional:       Appearance: She is well-developed.   HENT:      Head: Normocephalic.   Eyes:      Conjunctiva/sclera: Conjunctivae normal.      Pupils: Pupils are equal, round, and reactive to light.   Cardiovascular:      Rate and Rhythm: Normal rate and regular rhythm.      Heart sounds: Normal heart sounds.   Pulmonary:      Effort: Pulmonary effort is normal.      Breath sounds: Normal breath sounds.   Abdominal:      General: Bowel sounds are normal.      Palpations: Abdomen is soft.   Musculoskeletal:         General: Normal range of motion.      Cervical back: Normal range of motion and neck  supple.   Skin:     General: Skin is warm.   Neurological:      Mental Status: She is alert and oriented to person, place, and time.         Data Review:     ECG:     Assessment:      Cataract OD.    Plan:    CE OD.

## 2023-08-23 ENCOUNTER — TELEPHONE (OUTPATIENT)
Dept: OPHTHALMOLOGY | Facility: CLINIC | Age: 58
End: 2023-08-23
Payer: MEDICARE

## 2023-08-25 NOTE — PRE-PROCEDURE INSTRUCTIONS
- Nothing to eat or drink after midinight, except AM meds with small sips of water, Gatorade/Powerade  - Hold all Diabetic meds AM of surgery  - Hold all Insulin AM of surgery  - Hold all Fluid pills AM of surgery  - Hold all non-insulin shots until after surgery (Ozempic, Mounjaro, Trulicity, Victoza, Byetta, Wegovy and Adlyxin) (up to 7 days prior)  - Take all B/P meds, except those that contain a fluid pill  - Hold all vits and herbal meds AM of surgery  - Use inhalers as needed and bring AM of surgery  - Take blood thinner meds AM of surgery  - Use eye drops as directed  - Shower and wash face with dial soap for 3 mins PM prior and AM of surgery  - No powder, lotions, creams, (makeup),  or jewelry    - Wear comfortable clothing (button up shirt)    (Patients ride may not leave while patient is in surgery)    -- 2nd floor surgery ctr at [facility] @ [address] [place] [address]       Pt voiced understanding

## 2023-08-29 ENCOUNTER — HOSPITAL ENCOUNTER (OUTPATIENT)
Facility: HOSPITAL | Age: 58
Discharge: HOME | End: 2023-08-29
Attending: OPHTHALMOLOGY | Admitting: OPHTHALMOLOGY
Payer: MEDICARE

## 2023-08-29 ENCOUNTER — ANESTHESIA (OUTPATIENT)
Dept: SURGERY | Facility: HOSPITAL | Age: 58
End: 2023-08-29
Payer: MEDICARE

## 2023-08-29 ENCOUNTER — ANESTHESIA EVENT (OUTPATIENT)
Dept: SURGERY | Facility: HOSPITAL | Age: 58
End: 2023-08-29
Payer: MEDICARE

## 2023-08-29 VITALS
RESPIRATION RATE: 20 BRPM | TEMPERATURE: 98 F | DIASTOLIC BLOOD PRESSURE: 82 MMHG | OXYGEN SATURATION: 100 % | HEART RATE: 55 BPM | SYSTOLIC BLOOD PRESSURE: 170 MMHG

## 2023-08-29 DIAGNOSIS — H25.11 NUCLEAR SCLEROTIC CATARACT OF RIGHT EYE: Primary | ICD-10-CM

## 2023-08-29 PROCEDURE — D9220A PRA ANESTHESIA: ICD-10-PCS | Mod: CRNA,,, | Performed by: NURSE ANESTHETIST, CERTIFIED REGISTERED

## 2023-08-29 PROCEDURE — 37000009 HC ANESTHESIA EA ADD 15 MINS: Performed by: OPHTHALMOLOGY

## 2023-08-29 PROCEDURE — 66984 PR REMOVAL, CATARACT, W/INSRT INTRAOC LENS, W/O ENDO CYCLO: ICD-10-PCS | Mod: 79,RT,, | Performed by: OPHTHALMOLOGY

## 2023-08-29 PROCEDURE — 66984 XCAPSL CTRC RMVL W/O ECP: CPT | Mod: 79,RT,, | Performed by: OPHTHALMOLOGY

## 2023-08-29 PROCEDURE — 99900035 HC TECH TIME PER 15 MIN (STAT)

## 2023-08-29 PROCEDURE — 25000003 PHARM REV CODE 250: Performed by: OPHTHALMOLOGY

## 2023-08-29 PROCEDURE — D9220A PRA ANESTHESIA: ICD-10-PCS | Mod: ANES,,, | Performed by: STUDENT IN AN ORGANIZED HEALTH CARE EDUCATION/TRAINING PROGRAM

## 2023-08-29 PROCEDURE — 37000008 HC ANESTHESIA 1ST 15 MINUTES: Performed by: OPHTHALMOLOGY

## 2023-08-29 PROCEDURE — 36000707: Performed by: OPHTHALMOLOGY

## 2023-08-29 PROCEDURE — D9220A PRA ANESTHESIA: Mod: CRNA,,, | Performed by: NURSE ANESTHETIST, CERTIFIED REGISTERED

## 2023-08-29 PROCEDURE — 94761 N-INVAS EAR/PLS OXIMETRY MLT: CPT

## 2023-08-29 PROCEDURE — V2632 POST CHMBR INTRAOCULAR LENS: HCPCS | Performed by: OPHTHALMOLOGY

## 2023-08-29 PROCEDURE — 63600175 PHARM REV CODE 636 W HCPCS: Performed by: OPHTHALMOLOGY

## 2023-08-29 PROCEDURE — 36000706: Performed by: OPHTHALMOLOGY

## 2023-08-29 PROCEDURE — 63600175 PHARM REV CODE 636 W HCPCS: Performed by: NURSE ANESTHETIST, CERTIFIED REGISTERED

## 2023-08-29 PROCEDURE — D9220A PRA ANESTHESIA: Mod: ANES,,, | Performed by: STUDENT IN AN ORGANIZED HEALTH CARE EDUCATION/TRAINING PROGRAM

## 2023-08-29 PROCEDURE — 71000015 HC POSTOP RECOV 1ST HR: Performed by: OPHTHALMOLOGY

## 2023-08-29 DEVICE — IMPLANTABLE DEVICE: Type: IMPLANTABLE DEVICE | Site: EYE | Status: FUNCTIONAL

## 2023-08-29 RX ORDER — MOXIFLOXACIN 5 MG/ML
1 SOLUTION/ DROPS OPHTHALMIC
Status: COMPLETED | OUTPATIENT
Start: 2023-08-29 | End: 2023-08-29

## 2023-08-29 RX ORDER — SODIUM CHLORIDE 9 MG/ML
INJECTION, SOLUTION INTRAVENOUS CONTINUOUS
Status: DISCONTINUED | OUTPATIENT
Start: 2023-08-29 | End: 2023-08-29 | Stop reason: HOSPADM

## 2023-08-29 RX ORDER — CYCLOP/TROP/PROPA/PHEN/KET/WAT 1-1-0.1%
1 DROPS (EA) OPHTHALMIC (EYE)
Status: COMPLETED | OUTPATIENT
Start: 2023-08-29 | End: 2023-08-29

## 2023-08-29 RX ORDER — PREDNISOLONE ACETATE 10 MG/ML
SUSPENSION/ DROPS OPHTHALMIC
Status: DISCONTINUED | OUTPATIENT
Start: 2023-08-29 | End: 2023-08-29 | Stop reason: HOSPADM

## 2023-08-29 RX ORDER — EPINEPHRINE 1 MG/ML
INJECTION, SOLUTION, CONCENTRATE INTRAVENOUS
Status: DISCONTINUED | OUTPATIENT
Start: 2023-08-29 | End: 2023-08-29 | Stop reason: HOSPADM

## 2023-08-29 RX ORDER — MOXIFLOXACIN 5 MG/ML
SOLUTION/ DROPS OPHTHALMIC
Status: DISCONTINUED | OUTPATIENT
Start: 2023-08-29 | End: 2023-08-29 | Stop reason: HOSPADM

## 2023-08-29 RX ORDER — LIDOCAINE HYDROCHLORIDE 40 MG/ML
INJECTION, SOLUTION RETROBULBAR
Status: DISCONTINUED | OUTPATIENT
Start: 2023-08-29 | End: 2023-08-29 | Stop reason: HOSPADM

## 2023-08-29 RX ORDER — ACETAMINOPHEN 325 MG/1
650 TABLET ORAL EVERY 4 HOURS PRN
Status: DISCONTINUED | OUTPATIENT
Start: 2023-08-29 | End: 2023-08-29 | Stop reason: HOSPADM

## 2023-08-29 RX ORDER — MIDAZOLAM HYDROCHLORIDE 1 MG/ML
INJECTION, SOLUTION INTRAMUSCULAR; INTRAVENOUS
Status: DISCONTINUED | OUTPATIENT
Start: 2023-08-29 | End: 2023-08-29

## 2023-08-29 RX ORDER — HYDROCODONE BITARTRATE AND ACETAMINOPHEN 5; 325 MG/1; MG/1
1 TABLET ORAL EVERY 4 HOURS PRN
Status: DISCONTINUED | OUTPATIENT
Start: 2023-08-29 | End: 2023-08-29 | Stop reason: HOSPADM

## 2023-08-29 RX ORDER — TETRACAINE HYDROCHLORIDE 5 MG/ML
SOLUTION OPHTHALMIC
Status: DISCONTINUED | OUTPATIENT
Start: 2023-08-29 | End: 2023-08-29 | Stop reason: HOSPADM

## 2023-08-29 RX ADMIN — MOXIFLOXACIN OPHTHALMIC 1 DROP: 5 SOLUTION/ DROPS OPHTHALMIC at 07:08

## 2023-08-29 RX ADMIN — Medication 1 DROP: at 07:08

## 2023-08-29 RX ADMIN — MIDAZOLAM HYDROCHLORIDE 2 MG: 1 INJECTION, SOLUTION INTRAMUSCULAR; INTRAVENOUS at 08:08

## 2023-08-29 NOTE — BRIEF OP NOTE
[facility]  Brief Operative Note    Surgery Date: 8/29/2023     Surgeon(s) and Role:     * Justin Block MD - Primary    Assisting Surgeon: None    Pre-op Diagnosis:  NS (nuclear sclerosis), right [H25.11]    Post-op Diagnosis:  Post-Op Diagnosis Codes:     * NS (nuclear sclerosis), right [H25.11]    Procedure(s) (LRB):  EXTRACTION, CATARACT, WITH IOL INSERTION (Right)    Anesthesia: Local MAC    Operative Findings:     Estimated Blood Loss: * No values recorded between 8/29/2023  9:02 AM and 8/29/2023  9:30 AM *         Specimens:   Specimen (24h ago, onward)      None              Discharge Note    OUTCOME: Patient tolerated treatment/procedure well without complication and is now ready for discharge.    DISPOSITION: Home or Self Care    FINAL DIAGNOSIS:  Nuclear sclerotic cataract of right eye    FOLLOWUP: In clinic    DISCHARGE INSTRUCTIONS:    Discharge Procedure Orders   Other restrictions (specify):   Order Comments: No heavy lifting or bending for 1 week.

## 2023-08-29 NOTE — DISCHARGE INSTRUCTIONS
Cataract Post Surgery Instructions     START YOUR DROPS AS SOON AS YOU GET HOME FROM SURGERY      Instill the following drops 1 drop, three (3) times daily, every four (4) hours.      FIVE MINUTES APART FROM EACH OTHER      OFLOXACIN  ( Tan [place])    KETOROLAC  ( Gray Top)    PREDNISOLONE ACETATE  ( [place] or White Top)         RESTRICTIONS FOR SEVEN (7) DAYS FOLLOWING SURGERY     DO NOT lift anything over 10 pounds.     DO NOT bend at the waist, only at the knees.      DO NOT rub your eye.      DO NOT get any water into the eye.      DO NOT wear any makeup, lotions, or creams on/around the eye.     Wear the eye shield you were given after surgery anytime you go to sleep.  You may remove the shield while awake.           NOTE:     YOU MAY resume taking ALL your medications after surgery.     YOU MAY resume driving on your first post-operative appointment IF your vision is clear. DO NOT wear your eye shield while driving for your post operative appointments.      YOU MAY take an over-the counter pain medication such as Tylenol or Ibuprofen as needed for pain.     CALL US:  MILD DISCOMFORT IS NORMAL. HOWEVER, IF YOU EXPERIENCE SEVERE THROBBING PAIN, LOSS OF VISION, ONSET OF FLASHES AND/OR FLOATERS- CALL DR. DHILLON OFFICE: [phone]/ AFTER HOUR [phone] OR PROCEED TO THE EMERGENCY ROOM.

## 2023-08-29 NOTE — ANESTHESIA PREPROCEDURE EVALUATION
08/29/2023  Alivia Thomas is a 57 y.o., female.      Pre-op Assessment    I have reviewed the Patient Summary Reports.    I have reviewed the NPO Status.   I have reviewed the Medications.     Review of Systems  Anesthesia Hx:  No problems with previous Anesthesia   Denies Personal Hx of Anesthesia complications.   Social:  Non-Smoker    Hematology/Oncology:  Hematology Normal        EENT/Dental:EENT/Dental Normal   Cardiovascular:   Hypertension    Pulmonary:   Asthma Sleep Apnea    Hepatic/GI:   GERD    Musculoskeletal:   Arthritis     Neurological:  Neurology Normal    Endocrine:   Diabetes        Physical Exam  General: Well nourished and Cooperative    Airway:  Mallampati: I   Mouth Opening: Normal  TM Distance: Normal  Tongue: Normal  Neck ROM: Normal ROM    Dental:  Intact    Chest/Lungs:  Normal Respiratory Rate    Heart:  Rate: Normal        Anesthesia Plan  Type of Anesthesia, risks & benefits discussed:    Anesthesia Type: MAC  Intra-op Monitoring Plan: Standard ASA Monitors  Post Op Pain Control Plan: multimodal analgesia  Induction:  IV  Informed Consent: Informed consent signed with the Patient and all parties understand the risks and agree with anesthesia plan.  All questions answered.   ASA Score: 3  Day of Surgery Review of History & Physical: H&P Update referred to the surgeon/provider.    Ready For Surgery From Anesthesia Perspective.     .

## 2023-08-29 NOTE — PLAN OF CARE
Pt in preop bay 35, VSS, meds given and IV inserted. Pt denies any open wounds on body or the use of any weight loss injections. Pt  ready to roll.   Procedural consents verified with pt.

## 2023-08-29 NOTE — TRANSFER OF CARE
Anesthesia Transfer of Care Note    Patient: Alivia Thomas    Procedure(s) Performed: Procedure(s) (LRB):  EXTRACTION, CATARACT, WITH IOL INSERTION (Right)    Patient location: PACU    Anesthesia Type: MAC    Transport from OR: Transported from OR on room air with adequate spontaneous ventilation    Post pain: adequate analgesia    Post assessment: no apparent anesthetic complications and tolerated procedure well    Post vital signs: stable    Level of consciousness: awake, alert and oriented    Nausea/Vomiting: no nausea/vomiting    Complications: none    Transfer of care protocol was followed      Last vitals:   Visit Vitals  BP (!) 150/77   Pulse 64   Temp 36.7 °C (98.1 °F) (Temporal)   Resp 20   LMP 06/26/2018   SpO2 99%   Breastfeeding No

## 2023-08-29 NOTE — ANESTHESIA POSTPROCEDURE EVALUATION
Mikala Howard Patient Age: 81 year old  MESSAGE:   Patient calling and would like  to write a letter stating she is eligible to get the booster vaccine.       WEIGHT AND HEIGHT:   Wt Readings from Last 1 Encounters:   10/25/21 75.3 kg (166 lb)     Ht Readings from Last 1 Encounters:   10/25/21 5' 6\" (1.676 m)     BMI Readings from Last 1 Encounters:   10/25/21 26.79 kg/m²       ALLERGIES:  Adhesive   (environmental) and Sulfa antibiotics  Current Outpatient Medications   Medication   • digoxin (LANOXIN) 0.125 MG tablet   • ipratropium-albuterol (DUONEB) 0.5-2.5 (3) MG/3ML nebulizer solution   • Symbicort 160-4.5 MCG/ACT inhaler   • Rhopressa 0.02 % Solution   • metoPROLOL succinate (TOPROL-XL) 25 MG 24 hr tablet   • SPIRIVA HANDIHALER 18 MCG capsule for inhaler   • metoPROLOL succinate (TOPROL-XL) 50 MG 24 hr tablet   • KLOR-CON M10 10 MEQ sadaf ER tablet   • furosemide (LASIX) 20 MG tablet   • azithromycin (ZITHROMAX Z-ANA LAURA) 250 MG tablet   • ALPRAZolam (XANAX) 0.5 MG tablet   • docusate sodium (COLACE) 50 MG capsule   • albuterol 108 (90 Base) MCG/ACT inhaler   • alendronate (FOSAMAX) 70 MG tablet   • DIAZepam (VALIUM) 5 MG tablet   • bimatoprost (LUMIGAN) 0.01 % ophthalmic solution   • dilTIAZem (TIAZAC) 120 MG 24 hr capsule   • lisinopril (ZESTRIL) 5 MG tablet   • omeprazole (PRILOSEC) 40 MG capsule   • pravastatin (PRAVACHOL) 40 MG tablet   • Spacer/Aero-Holding Chambers (AEROCHAMBER) Misc   • warfarin (COUMADIN) 5 MG tablet     No current facility-administered medications for this visit.     PHARMACY to use:           Pharmacy preference(s) on file:   [id number] - [place], [place] - [address]  [facility] [address]  Phone: [phone] Fax: [phone]      CALL BACK INFO: Ok to leave response (including medical information) with family member or on answering machine  ROUTING: OK to hold message for return of patient's physician. Pt aware  Anesthesia Post Evaluation    Patient: Alivia Thomas    Procedure(s) Performed: Procedure(s) (LRB):  EXTRACTION, CATARACT, WITH IOL INSERTION (Right)    Final Anesthesia Type: MAC      Patient location during evaluation: PACU  Patient participation: Yes- Able to Participate  Level of consciousness: awake and alert  Post-procedure vital signs: reviewed and stable  Pain management: adequate  Airway patency: patent    PONV status at discharge: No PONV  Anesthetic complications: no      Cardiovascular status: stable  Respiratory status: room air  Hydration status: euvolemic  Follow-up not needed.          Vitals Value Taken Time   /84 08/29/23 0934   Temp 36.7 °C (98.1 °F) 08/29/23 0932   Pulse 58 08/29/23 0936   Resp 20 08/29/23 0932   SpO2 100 % 08/29/23 0936   Vitals shown include unvalidated device data.      No case tracking events are documented in the log.      Pain/Aracely Score: Aracely Score: 10 (8/29/2023  9:32 AM)         that physician is out of the office.        PCP: Harry Celeste MD         INS: Payor: MEDICARE / Plan: PARTA AND B / Product Type: MEDICARE   PATIENT ADDRESS:  [address] [place] [id number]

## 2023-08-29 NOTE — OP NOTE
Operative Date:  08/29/2023    Discharge Date:  08/29/2023    Discharge Patient Home    Report Title: Operative Note      SURGEON: Justin Block MD     ASSISTANT:     PREOPERATIVE DIAGNOSIS: Visually significant NSC cataract,  Right Eye.    POSTOPERATIVE DIAGNOSIS: Visually-significant NSC cataract,  Right Eye.    PROCEDURE PERFORMED: Phacoemulsification of the cataract with posterior chamber intraocular lens Right Eye.    ANESTHESIA: Topical with MAC     COMPLICATIONS: None    ESTIMATED BLOOD LOSS: Minimal    INDICATIONS FOR PROCEDURE:   The patient is a pleasant 57 year old woman with increasing difficulties with activities of daily living secondary to a dense visually significant cataract in the Right Eye.  Discussions have been carried out with this patient concerning the options to surgery, risks, benefits and expectations.  The patient voiced good understanding and wished to proceed with the above procedure.    PROCEDURE IN DETAIL: The patient was brought to the operating room and received topical anesthetic to the eye and then was prepped and draped in the usual sterile fashion.  Using the Gonzalez ring and the guarded tori blade set at 0.37 mm, a partial thickness clear cornea incision was made temporally.  The paracentesis site was made at the twelve o'clock position.  Omidria was injected into the anterior chamber through the paracentesis.  Viscoat was then injected into the anterior chamber.  The eye was then reentered at the primary surgical site with a 2.4 mm keratome followed by continuous capsulotomy, hydrodissection, hydrodelineation and phacoemulsification of the cataract.  Residual cortical material was removed using automated irrigation-aspiration technique.  Healon was injected into the posterior chamber and a CNAOTO 10.5 diopter lens was placed in the bag without difficulty. Residual viscoelastic was removed using automated irrigation-aspiration technique. The eye was re-pressurized  using BSS solution and both the paracentesis site and the primary surgical site were demonstrated to be watertight at the end of the case with Weck--Alia manipulation.  One drop of Ofloxacin and one drop of Pred acetate 1% was applied to the Right Eye .The eye was closed, patched and a Marcus shield placed.  The patient was taken to the recovery room in good and stable condition.  The patient tolerated the procedure well.  The patient was instructed to refrain from any heavy lifting, bending, stooping or straining activities, discharged home  and to follow-up in the morning for routine postoperative care with Justin Block MD.

## 2023-08-30 ENCOUNTER — OFFICE VISIT (OUTPATIENT)
Dept: OPHTHALMOLOGY | Facility: CLINIC | Age: 58
End: 2023-08-30
Payer: MEDICARE

## 2023-08-30 DIAGNOSIS — Z98.890 POST-OPERATIVE STATE: Primary | ICD-10-CM

## 2023-08-30 PROCEDURE — 99024 PR POST-OP FOLLOW-UP VISIT: ICD-10-PCS | Mod: POP,,, | Performed by: OPHTHALMOLOGY

## 2023-08-30 PROCEDURE — 99213 OFFICE O/P EST LOW 20 MIN: CPT | Mod: PBBFAC | Performed by: OPHTHALMOLOGY

## 2023-08-30 PROCEDURE — 99999 PR PBB SHADOW E&M-EST. PATIENT-LVL III: ICD-10-PCS | Mod: PBBFAC,,, | Performed by: OPHTHALMOLOGY

## 2023-08-30 PROCEDURE — 99999 PR PBB SHADOW E&M-EST. PATIENT-LVL III: CPT | Mod: PBBFAC,,, | Performed by: OPHTHALMOLOGY

## 2023-08-30 PROCEDURE — 99024 POSTOP FOLLOW-UP VISIT: CPT | Mod: POP,,, | Performed by: OPHTHALMOLOGY

## 2023-08-30 NOTE — PROGRESS NOTES
Subjective:       Patient ID: Alivia Thomas is a 57 y.o. female.    Chief Complaint: Post-op Evaluation (Alivia Thomas is a 58 y/o female )    HPI     Post-op Evaluation     Additional comments: Alivia Thomas is a 58 y/o female            Comments    Pt here for 1 day Post Op OD  Pt state no complaints at this time   Denies f/f      Gtts:  Oflox qid OD  Pred qid OD,QD OS  Keto qid OD,QD OS          Last edited by Zuleyma Rosas on 8/30/2023  8:22 AM.             Assessment:       1. Post-operative state        Plan:       S/p CE OU- Doing well.         CPM OD.  Taper gtts OS.  RTC 1 wk.

## 2023-09-06 ENCOUNTER — PATIENT MESSAGE (OUTPATIENT)
Dept: BARIATRICS | Facility: CLINIC | Age: 58
End: 2023-09-06
Payer: MEDICARE

## 2023-09-06 ENCOUNTER — OFFICE VISIT (OUTPATIENT)
Dept: OPHTHALMOLOGY | Facility: CLINIC | Age: 58
End: 2023-09-06
Payer: MEDICARE

## 2023-09-06 DIAGNOSIS — Z98.890 POST-OPERATIVE STATE: Primary | ICD-10-CM

## 2023-09-06 PROCEDURE — 99213 OFFICE O/P EST LOW 20 MIN: CPT | Mod: PBBFAC | Performed by: OPHTHALMOLOGY

## 2023-09-06 PROCEDURE — 99999 PR PBB SHADOW E&M-EST. PATIENT-LVL III: CPT | Mod: PBBFAC,,, | Performed by: OPHTHALMOLOGY

## 2023-09-06 PROCEDURE — 99024 POSTOP FOLLOW-UP VISIT: CPT | Mod: POP,,, | Performed by: OPHTHALMOLOGY

## 2023-09-06 PROCEDURE — 99999 PR PBB SHADOW E&M-EST. PATIENT-LVL III: ICD-10-PCS | Mod: PBBFAC,,, | Performed by: OPHTHALMOLOGY

## 2023-09-06 PROCEDURE — 99024 PR POST-OP FOLLOW-UP VISIT: ICD-10-PCS | Mod: POP,,, | Performed by: OPHTHALMOLOGY

## 2023-09-06 NOTE — PROGRESS NOTES
Subjective:       Patient ID: Alivia Thomas is a 57 y.o. female.    Chief Complaint: No chief complaint on file.    HPI    Pt here for 1 wk Post Op OD    Pt state: vision is good    Denies f/f      Gtts:  Oflox qid OD  Pred qid OD,QD OS  Keto qid OD,QD OS  Last edited by Isabel Pulido MA on 9/6/2023  9:24 AM.             Assessment:       1. Post-operative state        Plan:       S/p CE OU- Doing well.       Taper gtts OU.  RTC 3 wks.

## 2023-09-12 ENCOUNTER — PATIENT MESSAGE (OUTPATIENT)
Dept: BARIATRICS | Facility: CLINIC | Age: 58
End: 2023-09-12
Payer: MEDICARE

## 2023-09-13 DIAGNOSIS — G89.4 CHRONIC PAIN SYNDROME: Primary | ICD-10-CM

## 2023-09-13 DIAGNOSIS — M10.9 GOUT, UNSPECIFIED CAUSE, UNSPECIFIED CHRONICITY, UNSPECIFIED SITE: ICD-10-CM

## 2023-09-13 DIAGNOSIS — M79.671 RIGHT FOOT PAIN: ICD-10-CM

## 2023-09-13 DIAGNOSIS — M47.816 LUMBAR SPONDYLOSIS: ICD-10-CM

## 2023-09-13 DIAGNOSIS — M77.8 ENTHESOPATHY OF FOOT: ICD-10-CM

## 2023-09-13 RX ORDER — OXYCODONE AND ACETAMINOPHEN 10; 325 MG/1; MG/1
1 TABLET ORAL EVERY 8 HOURS PRN
Qty: 90 TABLET | Refills: 0 | Status: SHIPPED | OUTPATIENT
Start: 2023-09-13 | End: 2023-10-02 | Stop reason: SDUPTHER

## 2023-09-13 RX ORDER — INDOMETHACIN 50 MG/1
CAPSULE ORAL
Qty: 30 CAPSULE | Refills: 2 | Status: ON HOLD | OUTPATIENT
Start: 2023-09-13 | End: 2024-01-06 | Stop reason: HOSPADM

## 2023-09-13 RX ORDER — COLCHICINE 0.6 MG/1
CAPSULE ORAL
Qty: 90 CAPSULE | Refills: 1 | Status: SHIPPED | OUTPATIENT
Start: 2023-09-13

## 2023-09-13 NOTE — TELEPHONE ENCOUNTER
No care due was identified.  [facility] Embedded Care Due Messages. Reference number: [id number].   9/13/2023 2:00:24 PM CDT

## 2023-09-13 NOTE — TELEPHONE ENCOUNTER
----- Message from Torrie Estrella sent at 9/13/2023 11:56 AM CDT -----  Regarding: medication concerns  Name of Who is Calling: Chinmay           What is the request in detail: Patient is requesting a call back about her pain medication that she asked to be refilled.  She is upset           Can the clinic reply by [facility]: No           What Number to Call Back if not in [facility]: [phone]

## 2023-09-13 NOTE — TELEPHONE ENCOUNTER
----- Message from Vijaya Mccann sent at 9/13/2023 10:33 AM CDT -----  Name of Who is Calling:DONTAE MOON [[id number]]      What is the request in detail: PT STATED THAT SHE NEEDS TO SPEAK WITH THE OFFICE ASAP ABOUT MEDICATIONS AS SHE IS IN NEED.Please contact to further discuss and advise.      [facility] - [address]   Phone:  [phone]  Fax:  [phone]      Can the clinic reply by [facility]:[phone]           What Number to Call Back if not in [facility]:[phone]

## 2023-09-13 NOTE — TELEPHONE ENCOUNTER
Patient requesting refill on oxyCODONE-acetaminophen (PERCOCET)  mg   Last office visit 08/14/23   shows last refill on 08/10/23  Patient does not have a pain contract on file with [facility] department  Patient last UDS 08/14/23 was not consistent with current therapy

## 2023-09-14 NOTE — TELEPHONE ENCOUNTER
Refill Decision Note   Alivia [place]  is requesting a refill authorization.  Brief Assessment and Rationale for Refill:  Approve     Medication Therapy Plan:         Comments:     Note composed:7:37 PM 09/13/2023

## 2023-09-18 ENCOUNTER — OFFICE VISIT (OUTPATIENT)
Dept: PAIN MEDICINE | Facility: CLINIC | Age: 58
End: 2023-09-18
Payer: MEDICARE

## 2023-09-18 ENCOUNTER — OFFICE VISIT (OUTPATIENT)
Dept: INTERNAL MEDICINE | Facility: CLINIC | Age: 58
End: 2023-09-18
Payer: MEDICARE

## 2023-09-18 ENCOUNTER — LAB VISIT (OUTPATIENT)
Dept: LAB | Facility: HOSPITAL | Age: 58
End: 2023-09-18
Attending: INTERNAL MEDICINE
Payer: MEDICARE

## 2023-09-18 VITALS
DIASTOLIC BLOOD PRESSURE: 84 MMHG | WEIGHT: 259.06 LBS | HEART RATE: 72 BPM | BODY MASS INDEX: 43.16 KG/M2 | SYSTOLIC BLOOD PRESSURE: 148 MMHG | HEIGHT: 65 IN | OXYGEN SATURATION: 95 %

## 2023-09-18 VITALS
DIASTOLIC BLOOD PRESSURE: 92 MMHG | HEART RATE: 77 BPM | WEIGHT: 259.06 LBS | RESPIRATION RATE: 18 BRPM | TEMPERATURE: 98 F | BODY MASS INDEX: 43.16 KG/M2 | OXYGEN SATURATION: 100 % | SYSTOLIC BLOOD PRESSURE: 148 MMHG | HEIGHT: 65 IN

## 2023-09-18 DIAGNOSIS — M54.16 LUMBAR RADICULOPATHY: ICD-10-CM

## 2023-09-18 DIAGNOSIS — M48.061 SPINAL STENOSIS OF LUMBAR REGION, UNSPECIFIED WHETHER NEUROGENIC CLAUDICATION PRESENT: ICD-10-CM

## 2023-09-18 DIAGNOSIS — M53.3 SACROILIAC JOINT PAIN: ICD-10-CM

## 2023-09-18 DIAGNOSIS — M51.36 DDD (DEGENERATIVE DISC DISEASE), LUMBAR: ICD-10-CM

## 2023-09-18 DIAGNOSIS — M10.9 GOUT, UNSPECIFIED CAUSE, UNSPECIFIED CHRONICITY, UNSPECIFIED SITE: Chronic | ICD-10-CM

## 2023-09-18 DIAGNOSIS — E11.9 TYPE 2 DIABETES MELLITUS WITHOUT COMPLICATION, WITHOUT LONG-TERM CURRENT USE OF INSULIN: Primary | ICD-10-CM

## 2023-09-18 DIAGNOSIS — B07.0 PLANTAR WART: ICD-10-CM

## 2023-09-18 DIAGNOSIS — E11.9 TYPE 2 DIABETES MELLITUS WITHOUT COMPLICATION, WITHOUT LONG-TERM CURRENT USE OF INSULIN: ICD-10-CM

## 2023-09-18 DIAGNOSIS — G89.4 CHRONIC PAIN SYNDROME: Primary | ICD-10-CM

## 2023-09-18 LAB
ALBUMIN SERPL BCP-MCNC: 4 G/DL (ref 3.5–5.2)
ALP SERPL-CCNC: 60 U/L (ref 55–135)
ALT SERPL W/O P-5'-P-CCNC: 24 U/L (ref 10–44)
ANION GAP SERPL CALC-SCNC: 6 MMOL/L (ref 8–16)
AST SERPL-CCNC: 23 U/L (ref 10–40)
BASOPHILS # BLD AUTO: 0.03 K/UL (ref 0–0.2)
BASOPHILS NFR BLD: 0.6 % (ref 0–1.9)
BILIRUB SERPL-MCNC: 0.6 MG/DL (ref 0.1–1)
BUN SERPL-MCNC: 11 MG/DL (ref 6–20)
CALCIUM SERPL-MCNC: 9.7 MG/DL (ref 8.7–10.5)
CHLORIDE SERPL-SCNC: 103 MMOL/L (ref 95–110)
CO2 SERPL-SCNC: 29 MMOL/L (ref 23–29)
CREAT SERPL-MCNC: 0.7 MG/DL (ref 0.5–1.4)
DIFFERENTIAL METHOD: ABNORMAL
EOSINOPHIL # BLD AUTO: 0.1 K/UL (ref 0–0.5)
EOSINOPHIL NFR BLD: 2.5 % (ref 0–8)
ERYTHROCYTE [DISTWIDTH] IN BLOOD BY AUTOMATED COUNT: 13.6 % (ref 11.5–14.5)
EST. GFR  (NO RACE VARIABLE): >60 ML/MIN/1.73 M^2
ESTIMATED AVG GLUCOSE: 134 MG/DL (ref 68–131)
GLUCOSE SERPL-MCNC: 106 MG/DL (ref 70–110)
HBA1C MFR BLD: 6.3 % (ref 4–5.6)
HCT VFR BLD AUTO: 41 % (ref 37–48.5)
HGB BLD-MCNC: 12.9 G/DL (ref 12–16)
IMM GRANULOCYTES # BLD AUTO: 0.01 K/UL (ref 0–0.04)
IMM GRANULOCYTES NFR BLD AUTO: 0.2 % (ref 0–0.5)
LYMPHOCYTES # BLD AUTO: 1.8 K/UL (ref 1–4.8)
LYMPHOCYTES NFR BLD: 34.2 % (ref 18–48)
MCH RBC QN AUTO: 30.9 PG (ref 27–31)
MCHC RBC AUTO-ENTMCNC: 31.5 G/DL (ref 32–36)
MCV RBC AUTO: 98 FL (ref 82–98)
MONOCYTES # BLD AUTO: 0.6 K/UL (ref 0.3–1)
MONOCYTES NFR BLD: 11.2 % (ref 4–15)
NEUTROPHILS # BLD AUTO: 2.7 K/UL (ref 1.8–7.7)
NEUTROPHILS NFR BLD: 51.3 % (ref 38–73)
NRBC BLD-RTO: 0 /100 WBC
PLATELET # BLD AUTO: 238 K/UL (ref 150–450)
PMV BLD AUTO: 12 FL (ref 9.2–12.9)
POTASSIUM SERPL-SCNC: 4.1 MMOL/L (ref 3.5–5.1)
PROT SERPL-MCNC: 8 G/DL (ref 6–8.4)
RBC # BLD AUTO: 4.17 M/UL (ref 4–5.4)
SODIUM SERPL-SCNC: 138 MMOL/L (ref 136–145)
URATE SERPL-MCNC: 2.8 MG/DL (ref 2.4–5.7)
WBC # BLD AUTO: 5.18 K/UL (ref 3.9–12.7)

## 2023-09-18 PROCEDURE — 99213 OFFICE O/P EST LOW 20 MIN: CPT | Mod: 25,S$PBB,, | Performed by: NURSE PRACTITIONER

## 2023-09-18 PROCEDURE — 99213 PR OFFICE/OUTPT VISIT, EST, LEVL III, 20-29 MIN: ICD-10-PCS | Mod: 25,S$PBB,, | Performed by: NURSE PRACTITIONER

## 2023-09-18 PROCEDURE — 99215 OFFICE O/P EST HI 40 MIN: CPT | Mod: PBBFAC,27 | Performed by: NURSE PRACTITIONER

## 2023-09-18 PROCEDURE — 99214 OFFICE O/P EST MOD 30 MIN: CPT | Mod: S$PBB,,, | Performed by: INTERNAL MEDICINE

## 2023-09-18 PROCEDURE — 96372 THER/PROPH/DIAG INJ SC/IM: CPT | Mod: ,,, | Performed by: NURSE PRACTITIONER

## 2023-09-18 PROCEDURE — 85025 COMPLETE CBC W/AUTO DIFF WBC: CPT | Performed by: INTERNAL MEDICINE

## 2023-09-18 PROCEDURE — 36415 COLL VENOUS BLD VENIPUNCTURE: CPT | Performed by: INTERNAL MEDICINE

## 2023-09-18 PROCEDURE — 96372 PR INJECTION,THERAP/PROPH/DIAG2ST, IM OR SUBCUT: ICD-10-PCS | Mod: ,,, | Performed by: NURSE PRACTITIONER

## 2023-09-18 PROCEDURE — 99999PBSHW PR PBB SHADOW TECHNICAL ONLY FILED TO HB: ICD-10-PCS | Mod: PBBFAC,,,

## 2023-09-18 PROCEDURE — 99214 OFFICE O/P EST MOD 30 MIN: CPT | Mod: PBBFAC,25,27 | Performed by: INTERNAL MEDICINE

## 2023-09-18 PROCEDURE — 99999 PR PBB SHADOW E&M-EST. PATIENT-LVL V: CPT | Mod: PBBFAC,,, | Performed by: NURSE PRACTITIONER

## 2023-09-18 PROCEDURE — 99214 PR OFFICE/OUTPT VISIT, EST, LEVL IV, 30-39 MIN: ICD-10-PCS | Mod: S$PBB,,, | Performed by: INTERNAL MEDICINE

## 2023-09-18 PROCEDURE — 83036 HEMOGLOBIN GLYCOSYLATED A1C: CPT | Performed by: INTERNAL MEDICINE

## 2023-09-18 PROCEDURE — 99999 PR PBB SHADOW E&M-EST. PATIENT-LVL V: ICD-10-PCS | Mod: PBBFAC,,, | Performed by: NURSE PRACTITIONER

## 2023-09-18 PROCEDURE — 99999PBSHW PR PBB SHADOW TECHNICAL ONLY FILED TO HB: Mod: PBBFAC,,,

## 2023-09-18 PROCEDURE — 99999 PR PBB SHADOW E&M-EST. PATIENT-LVL IV: CPT | Mod: PBBFAC,,, | Performed by: INTERNAL MEDICINE

## 2023-09-18 PROCEDURE — 84550 ASSAY OF BLOOD/URIC ACID: CPT | Performed by: INTERNAL MEDICINE

## 2023-09-18 PROCEDURE — 80053 COMPREHEN METABOLIC PANEL: CPT | Performed by: INTERNAL MEDICINE

## 2023-09-18 PROCEDURE — 99999 PR PBB SHADOW E&M-EST. PATIENT-LVL IV: ICD-10-PCS | Mod: PBBFAC,,, | Performed by: INTERNAL MEDICINE

## 2023-09-18 RX ORDER — KETOROLAC TROMETHAMINE 30 MG/ML
30 INJECTION, SOLUTION INTRAMUSCULAR; INTRAVENOUS
Status: COMPLETED | OUTPATIENT
Start: 2023-09-18 | End: 2023-09-18

## 2023-09-18 RX ORDER — METHYLPREDNISOLONE 4 MG/1
TABLET ORAL
Qty: 1 EACH | Refills: 0 | Status: SHIPPED | OUTPATIENT
Start: 2023-09-18 | End: 2023-10-23

## 2023-09-18 RX ORDER — TIRZEPATIDE 2.5 MG/.5ML
2.5 INJECTION, SOLUTION SUBCUTANEOUS
Qty: 4 PEN | Refills: 2 | Status: SHIPPED | OUTPATIENT
Start: 2023-09-18 | End: 2023-11-26

## 2023-09-18 RX ORDER — TRETINOIN 0.8 MG/G
GEL TOPICAL
Qty: 50 G | Refills: 0 | Status: SHIPPED | OUTPATIENT
Start: 2023-09-18

## 2023-09-18 RX ADMIN — KETOROLAC TROMETHAMINE 30 MG: 60 INJECTION, SOLUTION INTRAMUSCULAR at 01:09

## 2023-09-18 NOTE — PROGRESS NOTES
Subjective:       Patient ID: Alivia Thomas is a 57 y.o. female.    Chief Complaint: Annual Exam    HPIPt with sciatica - did MRI has spinal stenosis and neural foraminal narrowing.  Epidural not helpful.  No CP or SOB. Weight is creeping up.    Review of Systems   Constitutional:  Positive for activity change.   HENT:  Negative for hearing loss and trouble swallowing.    Eyes:  Negative for discharge.   Respiratory:  Negative for chest tightness, shortness of breath (PND or orthopnea) and wheezing.    Cardiovascular:  Negative for chest pain and palpitations.   Gastrointestinal:  Negative for abdominal pain, constipation, diarrhea, nausea and vomiting.   Genitourinary:  Negative for difficulty urinating, dysuria and hematuria.   Neurological:  Negative for seizures, syncope and headaches.   Psychiatric/Behavioral:  Negative for dysphoric mood.        Objective:      Physical Exam  Constitutional:       General: She is not in acute distress.     Appearance: She is well-developed.   HENT:      Head: Normocephalic.   Eyes:      Pupils: Pupils are equal, round, and reactive to light.   Neck:      Thyroid: No thyromegaly.      Vascular: No JVD.   Cardiovascular:      Rate and Rhythm: Normal rate and regular rhythm.      Heart sounds: Normal heart sounds. No murmur heard.     No friction rub. No gallop.   Pulmonary:      Effort: Pulmonary effort is normal.      Breath sounds: Normal breath sounds. No wheezing or rales.   Abdominal:      General: Bowel sounds are normal. There is no distension.      Palpations: Abdomen is soft. There is no mass.      Tenderness: There is no abdominal tenderness. There is no guarding or rebound.   Musculoskeletal:      Cervical back: Neck supple.   Lymphadenopathy:      Cervical: No cervical adenopathy.   Skin:     General: Skin is warm and dry.   Neurological:      Mental Status: She is alert and oriented to person, place, and time.      Deep Tendon Reflexes: Reflexes are normal and  symmetric.   Psychiatric:         Behavior: Behavior normal.         Thought Content: Thought content normal.         Judgment: Judgment normal.         Assessment:       1. Type 2 diabetes mellitus without complication, without long-term current use of insulin    2. Gout, unspecified cause, unspecified chronicity, unspecified site    3. Plantar wart        Plan:   Type 2 diabetes mellitus without complication, without long-term current use of insulin  -     CBC Auto Differential; Future; Expected date: 09/18/2023  -     Comprehensive Metabolic Panel; Future; Expected date: 09/18/2023  -     Hemoglobin A1C; Future; Expected date: 09/18/2023  -     tirzepatide (MOUNJARO) 2.5 mg/0.5 mL PnIj; Inject 2.5 mg into the skin every 7 days.  Dispense: 4 pen ; Refill: 2    Gout, unspecified cause, unspecified chronicity, unspecified site  -     URIC ACID; Future; Expected date: 09/18/2023    Plantar wart  -     tretinoin microspheres (RETIN-A MICRO PUMP) 0.08 % GlwP; Apply to affected area daily  Dispense: 50 g; Refill: 0    Lumbar radiculopathy  -     methylPREDNISolone (MEDROL, MICKI,) 4 mg tablet; use as directed  Dispense: 1 each; Refill: 0        Answers submitted by the patient for this visit:  Review of Systems Questionnaire (Submitted on 9/17/2023)  activity change: Yes  hearing loss: No  trouble swallowing: No  eye discharge: No  chest tightness: No  wheezing: No  chest pain: No  palpitations: No  constipation: No  vomiting: No  diarrhea: No  difficulty urinating: No  hematuria: No  headaches: No  dysphoric mood: No

## 2023-09-18 NOTE — PROGRESS NOTES
Chronic patient Established Note (Follow up visit)       SUBJECTIVE:    Interval History 9/18/2023:  The patient presents today to request an injection for increased back pain. She has been having worsened pain over the past couple of weeks. She does not wish to have another KERI at this time. She is having lower back pain with radiation down the back of both legs, R>L. She has associated numbness and tingling as well as subjective weakness. No bowel or bladder incontinence. She has not seen neurosurgery. Her pain today is 10/10.    Interval History 8/14/2023:  The patient is here for follow up after procedure for back pain. She is now s/p L5/S1 IL KERI with about 50% relief. She still has lower back pain with radiation down the back of the legs, stopping at the knees. She has associated numbness to lower extremities. Her pain worsens with walking, bending and reaching upward. She has been working on home exercises and stretches without much benefit. She has some benefit with medication as needed.  She report Her pain today is 9/10.    Interval History 7/10/2023:  The patient is here today for evaluation of increased lower back pain. She has a long-standing history of back pain which is mainly axial. She has had worsened symptoms over the past couple of weeks, most severe over the past 3 days. The pain now radiates down the back of both legs with burning and numbness. She finds it difficult to stand or walk for any length of time. No bowel or bladder incontinence. No fever or malaise. Medications are helping somewhat. She continues with home PT exercises as previously taught in PT. Her pain today is 10/10.    Interval History 4/10/2023:  The patient returns to clinic today for follow up of low back and knee pain via virtual visit. She is s/p right L3,4,5 RFA on 3/6/2023. Her left side procedure was rescheduled due to illness. This is scheduled for May 1st. She reports some relief of her right sided low back pain. She  continues to report left sided low back pain. She denies any radicular leg pain. She does endorse morning stiffness. She continues to perform a home exercise routine. She is taking Percocet as needed for severe pain. She denies any other health changes.     Interval History 1/30/2023:  The patient returns to clinic today for follow up of low back and knee pain. She is s/p right genicular RFA on 12/12/2022 and left genicular RFA on 1/9/2023. She reports 60% relief of her knee pain. She reports intermittent bilateral knee pain, this is tolerable at this time. She reports increased low back pain. Her pain is constant and aching in nature. Her leg pain is much improved. Her pain is worse with moving from sitting to standing. She does endorse morning stiffness. She continues to participate in physical therapy and a home exercise routine. She continues to take Percocet as needed with benefit and without adverse effects. She denies any other health changes.     Interval History 11/22/2022:  The patient returns to clinic today for follow up of low back and knee pain. She continues to report low back pain that radiates into the posterior aspect of both legs to under her feet. Her pain is worse with moving from sitting to standing. She also endorses morning stiffness. She recently started physical therapy. She has completed one session. She continues to report bilateral knee pain. She endorses worsening pain with the cold weather. Her pain is worse with standing and walking. She continues to take Percocet as needed with benefit and without adverse effects. She denies any other health changes. Her pain today is 8/10.    Interval History 10/5/2022:  She returns for follow-up.  Her knees are doing well.  She has some discomfort but not enough to do procedures.  Her main complaint is lumbar spine pain for the past few weeks that is radiating to the dorsal aspect of both lower extremities.  She has no red flag signs/symptoms.  No  new bowel or bladder symptomatology.  The pain radiates from the back down to the plantar aspect of both feet.  It is activity related.  Rest helps some but it does not resolve the pain.  She has not been in therapy for a while.  No recent imaging.  No recent weight changes.  Otherwise, no new symptomatology.  She goes regularly to her primary care physician for health maintenance.    Interval History 8/9/2022:  Alivia Thomas presents to the clinic for a follow-up appointment for low back and knee pain. She is s/p right genicular RFA on 5/9/2022 and left genicular RFA on 5/22/2022. She reports 60% relief of her knee pain. She also had panniculectomy on 6/14/2022. She is healing well. She continues to report intermittent low back pain, worse with prolonged activity. She denies any radicular leg pain. Her pain is worse prolonged standing and walking. She continues to perform a homoe exercise routine. She continues to take Percocet as needed with benefit and without adverse effects. She denies any other health changes. Her pain today is 7/10.    Interval History 4/9/2022:  The patient returns to clinic today for follow-up bilateral L3, 4, 5 RFA last month.  She reports that she is feeling very well following the procedure and reports 60-70% pain relief.  Today, she reports that her bilateral knee pain has continued to worsen, and she would like to go ahead and repeat bilateral genicular RFA as soon as possible.  She denies any new numbness, tingling, weakness, saddle anesthesia or bowel/bladder incontinence.    Interval History 12/28/2021:  The patient returns to clinic today for follow up via virtual visit. She continues to report bilateral knee pain. This pain is worse with prolonged standing and walking. She continues to report low back and buttock pain. She denies any radicular leg pain. She continues to report a home exercise routine. She continues to report benefit with Percocet. She denies any adverse  effects. She denies any other health changes.    Pain Disability Index Review:      9/18/2023     1:25 PM 8/14/2023     8:43 AM 7/10/2023     2:54 PM   Last 3 PDI Scores   Pain Disability Index (PDI) 24 [phone] 50       Pain Medications:  Percocet 10/325 mg TID PRN pain    Opioid Contract: yes     report:  Reviewed and consistent with medication use as prescribed.    Pain Procedures:   steroid inj and visco-supplementation (series of 3) in left knee- not helpful  3/31/14 Bilateral genicular nerve blocks  2/16/16 Bilateral L2,3,4,5 MBB  3/1/16 Bilateral L2,3,4,5 MBB   4/6/16 Left L2,3,4,5 RFA- significant relief  4/20/16 Right L2,3,4,5 RFA- significant relief  10/5/16 Left L2,3,4,5 cooled RFA  10/19/16 Right L2,3,4,5 cooled RFA  4/26/17 Left L2,3,4,5 cooled RFA  5/9/17 Right L2,3,4,5 cooled RFA  12/26/2017- Left L2,3,4,5 cooled RFA  1/9/2018- Right L2,3,4,5 cooled RFA  6/26/18 Left L2,3,4,5 cooled RFA- 60% relief  7/10/18 Right L2,3,4,5 cooled RFA- 60% relief  9/28/18 TPIs- significant relief  12/27/18 Left L2,3,4,5 RFA- 70% relief  1/29/19 Right L2,3,4,5 RFA- 70% relief  6/18/19 Left L2,3,4,5 RFA- 70% relief  7/9/19 Right L2,3,4,5 RFA- 50% relief  12/19/19 Left L2,3,4,5 RFA- 80% relief  12/31/19 Right L2,3,4,5 RFA- 50% relief  3/2/2020- Right genicular nerve block- 70% relief for one month  3/12/20 Left subacromial bursa injection- 90% relief  5/18/2020- Left genicular nerve block- 70%  6/9/2020- Bilateral SI joint injections- 50% relief  10/13/2020- Right genicular RFA- 50% relief   11/3/2020- Left genicular RFA- 50% relief  12/15/2020- Left L2,3,4,5 RFA  12/29/2020- Right L2,3,4,5 RFA  4/26/2021- Left subacromial bursa'  5/11/2021- Bilateral SI joint injections   6/28/2021- Left genicular RFA  7/13/2021- Right genicular RFA  8/24/2021- Right L2,3,4,5 RFA  9/11/2021- Left L2,3,4,5 RFA  3/7/2022- Right L2,3,4,5 RFA  3/21/2022- Left L2,3,4,5 RFA  5/9/2022- Right genicular RFA  5/23/2022- Left genicular RFA  12/12/2022-  Right genicular RFA  1/9/2023- Left genicular RFA  3/6/2023- Right L3,4,5 RFA  7/24/23 L5/S1 IL KERI- 50% relief    Physical Therapy/Home Exercise: yes    Imaging:   Xray Knee 3/6/2019:  FINDINGS:  Postop bilateral knee replacements.  The the the the irregularity about the left patella with soft tissue calcifications similar.  Small joint effusion.  Some irregularity of the right patella unchanged as well.     IMPRESSION:      Postop bilateral knee replacement with irregularity about the patella as above.    MRI Cervical Spine 4/24/2018:  FINDINGS:  There is reversal of the normal cervical lordosis which could be related to patient positioning versus muscle spasm.     Cervical vertebral body heights are well maintained without evidence of acute fracture or dislocation.  Marrow signal is unremarkable.     Spinal canal contents are unremarkable.  No abnormal masses or collections.     Individual levels as detailed below:     C2-3: No significant spinal canal stenosis or neuroforaminal narrowing.     C3-4: Facet arthropathy.  No significant spinal canal stenosis or neuroforaminal narrowing.     C4-5: Facet arthropathy.  Small broad-based disc osteophyte complex.  Mild right neuroforaminal narrowing.  Mild spinal canal stenosis.     C5-6: Facet arthropathy.  Uncovertebral joint spurring.  Broad-based posterior disc osteophyte complex.  Mild right neuroforaminal narrowing.  Mild spinal canal stenosis.     C6-7: Facet arthropathy.  No significant spinal canal stenosis or neuroforaminal narrowing.     C7-T1: No significant spinal canal stenosis or neuroforaminal narrowing.     Paraspinal muscles are unremarkable.  Soft tissues are intact.     IMPRESSION:      Mild multilevel cervical spondylosis with mild spinal canal stenosis and mild right neuroforaminal narrowing at C4-5 and C5-6.    Xray Lumbar Spine 9/21/2015:  Results: AP, lateral neutral, lateral flexion , lateral extension, bilateral oblique and spot views.  The  alignment of the lumbar spine demonstrates a mild levoscoliosis .  11-mm anterior listhesis of L4 relative to L5 with no translational abnormalities   seen on flexion and extension views.  The vertebral body heights  are well-maintained  , mild disk space narrowing L4-L5 and L5-S1.   Mild anterior and marginal osteophyte formation seen  throughout the lumbar spine  .  The oblique views demonstrate no   definite spondylolysis.  There is facet joint osseous hypertrophy noted at L3-L4 and L4-L5.  IMPRESSION:         The Significant spondylosis of the lumbar spine with grade 1 anterior listhesis L4 relative to L5.  Facet joint osseous hypertrophy L3-L4 and L4-5.    Allergies:   Review of patient's allergies indicates:   Allergen Reactions    Strawberry Anaphylaxis       Current Medications:   Current Outpatient Medications   Medication Sig Dispense Refill    albuterol (VENTOLIN HFA) 90 mcg/actuation inhaler INHALE TWO PUFFS INTO LUNGS EVERY 4 TO 6 HOURS AS NEEDED FOR SHORTNESS OF BREATH AND FOR WHEEZING 18 g 1    allopurinoL (ZYLOPRIM) 300 MG tablet Take 1 tablet (300 mg total) by mouth 2 (two) times daily. 180 tablet 3    atorvastatin (LIPITOR) 20 MG tablet TAKE 1 TABLET (20 MG TOTAL) BY MOUTH ONCE DAILY. 90 tablet 2    b complex vitamins tablet Take 1 tablet by mouth every other day.       calcium citrate/vitamin D2 (ISIAH-CITRATE ORAL) Take 500 mg by mouth 2 (two) times daily.      colchicine, gout, (MITIGARE) 0.6 mg Cap TAKE 1 CAPSULE (0.6 MG TOTAL) BY MOUTH DAILY AS NEEDED (FLARE UP). 90 capsule 1    cyanocobalamin (VITAMIN B-12) 1000 MCG tablet Take 100 mcg by mouth every morning.      diclofenac sodium (VOLTAREN) 1 % Gel Apply 2 g topically 4 (four) times daily as needed. To painful area on the feet. 500 g 5    FLUoxetine (PROZAC) 20 mg/5 mL (4 mg/mL) solution TAKE 5 MLS BY MOUTH ONCE DAILY. 450 mL 3    fluticasone propionate (FLONASE) 50 mcg/actuation nasal spray 1 SPRAY BY EACH NOSTRIL ROUTE 2 TIMES DAILY AS  NEEDED FOR RHINITIS. 48 g 3    indomethacin (INDOCIN) 50 MG capsule TAKE 1 CAPSULE BY MOUTH 2 TIMES DAILY WITH MEALS 30 capsule 2    LIDOcaine (XYLOCAINE) 5 % Oint ointment APPLY TOPICALLY AS NEEDED. 35.44 g 6    methylPREDNISolone (MEDROL, MICKI,) 4 mg tablet use as directed 1 each 0    MULTIVIT-IRON-MIN-FOLIC ACID 3,500-18-0.4 UNIT-MG-MG ORAL CHEW Take 1 tablet by mouth 2 (two) times daily.       nystatin (MYCOSTATIN) powder Apply topically 2 (two) times daily. 60 g 3    omeprazole (PRILOSEC) 20 MG capsule TAKE 1 CAPSULE (20 MG TOTAL) BY MOUTH EVERY MORNING. 90 capsule 2    oxybutynin (DITROPAN-XL) 5 MG TR24 TAKE 1 TABLET BY MOUTH ONCE DAILY. 90 tablet 3    oxyCODONE-acetaminophen (PERCOCET)  mg per tablet Take 1 tablet by mouth every 8 (eight) hours as needed for Pain. 90 tablet 0    tirzepatide (MOUNJARO) 2.5 mg/0.5 mL PnIj Inject 2.5 mg into the skin every 7 days. 4 pen 2    tiZANidine (ZANAFLEX) 4 MG tablet Take 1 tablet (4 mg total) by mouth every 8 (eight) hours as needed (muscle pain). 90 tablet 2    tretinoin microspheres (RETIN-A MICRO PUMP) 0.08 % GlwP Apply to affected area daily 50 g 0    urea (CARMOL) 40 % Crea Apply topically once daily. To dry skin on the feet. 120 g 5    vitamin D 185 MG Tab Take 5,000 mg by mouth every morning.        No current facility-administered medications for this visit.     Facility-Administered Medications Ordered in Other Visits   Medication Dose Route Frequency Provider Last Rate Last Admin    0.9%  NaCl infusion   Intravenous Continuous Lina Wagner MD 25 mL/hr at 12/15/20 1506 25 mL/hr at 12/15/20 1506    0.9%  NaCl infusion   Intravenous Continuous Ciro Chung MD           REVIEW OF SYSTEMS:    GENERAL:  No weight loss, malaise or fevers.  HEENT:  Negative for frequent or significant headaches.  NECK:  Negative for lumps, goiter, pain and significant neck swelling.  RESPIRATORY:  Negative for cough, wheezing or shortness of breath.  CARDIOVASCULAR:   Negative for chest pain, leg swelling or palpitations. HTN  GI:  Negative for abdominal discomfort, blood in stools or black stools or change in bowel habits. GERD  MUSCULOSKELETAL:  See HPI.  SKIN:  Negative for lesions, rash, and itching.  PSYCH:  Negative for sleep disturbance, mood disorder and recent psychosocial stressors.  HEMATOLOGY/LYMPHOLOGY:  Negative for prolonged bleeding, bruising easily or swollen nodes.  NEURO:   No history of headaches, syncope, paralysis, seizures or tremors.  ENDO: Diabetes  All other reviewed and negative other than HPI.    Past Medical History:  Past Medical History:   Diagnosis Date    Allergy     Anemia     Asthma     Bilateral shoulder bursitis     Cervical stenosis of spine     Chronic pain     DDD (degenerative disc disease), cervical     Depression 01/28/2019    Diabetes mellitus type II     DJD (degenerative joint disease) of knee 06/19/2014    Facet arthritis of lumbar region 12/17/2015    Facet syndrome 12/17/2015    GERD (gastroesophageal reflux disease)     Heartburn     Hyperlipidemia     Hypertension     Morbid obesity     Neuromuscular disorder     Nuclear sclerotic cataract of left eye 8/8/2023    PADDY (obstructive sleep apnea)     Proteinuria     Right carpal tunnel syndrome     Sacroiliitis 06/13/2018    Sacroiliitis     Sleep apnea     Uterine fibroid        Past Surgical History:  Past Surgical History:   Procedure Laterality Date    CARPAL TUNNEL RELEASE      CARPAL TUNNEL RELEASE  1980s    left    CARPAL TUNNEL RELEASE  2012    right    CATARACT EXTRACTION W/  INTRAOCULAR LENS IMPLANT Left 8/8/2023    Procedure: EXTRACTION, CATARACT, WITH IOL INSERTION;  Surgeon: Justin Block MD;  Location: [facility] OR;  Service: Ophthalmology;  Laterality: Left;    CATARACT EXTRACTION W/  INTRAOCULAR LENS IMPLANT Right 8/29/2023    Procedure: EXTRACTION, CATARACT, WITH IOL INSERTION;  Surgeon: Justin Block MD;  Location: [facility] OR;  Service: Ophthalmology;   Laterality: Right;    COLONOSCOPY N/A 6/15/2020    Procedure: COLONOSCOPY;  Surgeon: Jc Koo MD;  Location: [facility] (4TH FLR);  Service: Endoscopy;  Laterality: N/A;  COVID screening on 6/13/20 at [facility]  pt updated on drop off location and no visitor policy-    EPIDURAL STEROID INJECTION N/A 7/24/2023    Procedure: INJECTION, STEROID, EPIDURAL, L5/S1 SOONER DATE;  Surgeon: Israel Hurtado MD;  Location: [facility] PAIN T;  Service: Pain Management;  Laterality: N/A;    ESOPHAGOGASTRODUODENOSCOPY N/A 3/27/2019    Procedure: EGD (ESOPHAGOGASTRODUODENOSCOPY);  Surgeon: Robbin Bar MD;  Location: [facility] (2ND FLR);  Service: Endoscopy;  Laterality: N/A;  BMI 61.3/2nd floor case/svn    INJECTION OF JOINT Bilateral 6/9/2020    Procedure: INJECTION, JOINT, BILATERAL SI;  Surgeon: Lina Wagner MD;  [facility];  Service: Pain Management;  Laterality: Bilateral;  B/L SI Joint Injection    INJECTION OF JOINT Bilateral 5/11/2021    Procedure: INJECTION, JOINT, SACROILIAC (SI) need consent;  Surgeon: Lina Wagner MD;  Location: [facility];  Service: Pain Management;  Laterality: Bilateral;    JOINT REPLACEMENT Bilateral     with 2 revisions on rt    KNEE SURGERY  3/2010    orthroscope    KNEE SURGERY  6-19-14    left TKR    LAPAROSCOPIC SLEEVE GASTRECTOMY N/A 8/28/2019    Procedure: GASTRECTOMY, SLEEVE, LAPAROSCOPIC with intraop EGD;  Surgeon: Heriberto Clements MD;  Location: [facility] OR 2ND FLR;  Service: General;  Laterality: N/A;    PANNICULECTOMY N/A 6/14/2022    Procedure: PANNICULECTOMY;  Surgeon: Rhett Gray MD;  Location: [facility] OR [address] [place] [address];  Service: Plastics;  Laterality: N/A;    RADIOFREQUENCY ABLATION Right 7/9/2019    Procedure: RADIOFREQUENCY ABLATION;  Surgeon: Lina Wagner MD;  [facility];  Service: Pain Management;  Laterality: Right;  RIGHT RFA L2,3,4,5  2 of 2    RADIOFREQUENCY ABLATION Left 12/19/2019    Procedure: RADIOFREQUENCY  ABLATION, LEFT L2-L3-L4-L5 MEDIAL BRANCH 1 OF 2;  Surgeon: Lina Wagner MD;  Location: [facility] PAIN MGT;  Service: Pain Management;  Laterality: Left;    RADIOFREQUENCY ABLATION Right 12/31/2019    Procedure: RADIOFREQUENCY ABLATION, RIGHT L2-L3-L4-L5  2 OF 2;  Surgeon: Lina Wagner MD;  Location: [facility] PAIN [facility]GT;  Service: Pain Management;  Laterality: Right;    RADIOFREQUENCY ABLATION Right 10/13/2020    Procedure: RADIOFREQUENCY ABLATION, Genicular Cooled 1 of 2;  Surgeon: Lina Wagner MD;  Location: [facility] PAIN [facility]GT;  Service: Pain Management;  Laterality: Right;    RADIOFREQUENCY ABLATION Left 11/3/2020    Procedure: RADIOFREQUENCY ABLATION, GENICULAR COOLED  2 OF 2;  Surgeon: Lina Wagner MD;  Location: [facility]GT;  Service: Pain Management;  Laterality: Left;    RADIOFREQUENCY ABLATION Left 12/15/2020    Procedure: RADIOFREQUENCY ABLATION LEFT L2,3,4,5 1 of 2;  Surgeon: Lina Wagner MD;  Location: [facility] PAIN MGT;  Service: Pain Management;  Laterality: Left;    RADIOFREQUENCY ABLATION Right 12/29/2020    Procedure: RADIOFREQUENCY ABLATION RIGHT L2,3,4,5 2 of 2;  Surgeon: Lina Wagner MD;  Location: [facility] PAIN MGT;  Service: Pain Management;  Laterality: Right;    RADIOFREQUENCY ABLATION Left 6/29/2021    Procedure: RADIOFREQUENCY ABLATION GENICULAR COOLED;  Surgeon: Lina Wagner MD;  Location: [facility] PAIN MGT;  Service: Pain Management;  Laterality: Left;  1 of 2    RADIOFREQUENCY ABLATION Right 7/13/2021    Procedure: RADIOFREQUENCY ABLATION GENICULAR;  Surgeon: Lina Wagner MD;  Location: [facility];  Service: Pain Management;  Laterality: Right;  2 of 2    RADIOFREQUENCY ABLATION Right 8/24/2021    Procedure: RADIOFREQUENCY ABLATION, L2-L3-L4-L5 MEDIAL BRANCH 1 OF 2;  Surgeon: Lina Wagner MD;  Location: [facility];  Service: Pain Management;  Laterality: Right;    RADIOFREQUENCY ABLATION Left 9/11/2021    Procedure: RADIOFREQUENCY ABLATION, L2-L3-L4-L5  MEDIAL BR ANCH 2 OF 2;  Surgeon: Lina Wagner MD;  Location: [facility] PAIN MGT;  Service: Pain Management;  Laterality: Left;    RADIOFREQUENCY ABLATION Right 3/7/2022    Procedure: RADIOFREQUENCY ABLATION RIGHT L3,4,5 1 of 2, needs consent;  Surgeon: Israel Hurtado MD;  Location: [facility] PAIN MGT;  Service: Pain Management;  Laterality: Right;    RADIOFREQUENCY ABLATION Left 3/21/2022    Procedure: RADIOFREQUENCY ABLATION RIGHT L3,4,5 2 of 2, needs consent;  Surgeon: Israel Hurtado MD;  Location: [facility] PAIN MGT;  Service: Pain Management;  Laterality: Left;    RADIOFREQUENCY ABLATION Right 5/9/2022    Procedure: RADIOFREQUENCY ABLATION RIGHT GENICULAR COOLED 1 of 2;  Surgeon: Israel Hurtado MD;  Location: [facility] PAIN MGT;  Service: Pain Management;  Laterality: Right;    RADIOFREQUENCY ABLATION Left 5/23/2022    Procedure: RADIOFREQUENCY ABLATION LEFT GENICULAR COOLED  2 of 2;  Surgeon: Israel Hurtado MD;  Location: [facility] PAIN MGT;  Service: Pain Management;  Laterality: Left;    RADIOFREQUENCY ABLATION Right 12/12/2022    Procedure: RADIOFREQUENCY ABLATION RIGHT GENICULAR COOLED  ONE OF TWO  NEEDS CONSENT;  Surgeon: Israel Hurtado MD;  Location: [facility] PAIN MGT;  Service: Pain Management;  Laterality: Right;    RADIOFREQUENCY ABLATION Left 1/9/2023    Procedure: RADIOFREQUENCY ABLATION LEFT GENICULAR COOLED  TWO OF TWO NEEDS CONSENT;  Surgeon: Israel Hurtado MD;  Location: [facility] PAIN MGT;  Service: Pain Management;  Laterality: Left;    RADIOFREQUENCY ABLATION Right 3/6/2023    Procedure: RADIOFREQUENCY ABLATION RIGHT L3,L4,L5;  Surgeon: Israel Hurtado MD;  [facility];  Service: Pain Management;  Laterality: Right;    RADIOFREQUENCY ABLATION Left 5/22/2023    Procedure: RADIOFREQUENCY ABLATION LEFT L3,L4,L5;  Surgeon: Israel Hurtado MD;  Location: [facility] PAIN MGT;  Service: Pain Management;  Laterality: Left;  4/3 LM TO R/S    RADIOFREQUENCY ABLATION OF LUMBAR MEDIAL BRANCH NERVE AT SINGLE LEVEL Left 6/26/2018    Procedure:  RADIOFREQUENCY ABLATION, NERVE, MEDIAL BRANCH, LUMBAR, 1 LEVEL;  Surgeon: Lina Wagner MD;  Location: [facility]IN MGT;  Service: Pain Management;  Laterality: Left;  Left Cooled RFA @ L2,3,4,5  [id number]  with Sedation    1 of 2    RADIOFREQUENCY ABLATION OF LUMBAR MEDIAL BRANCH NERVE AT SINGLE LEVEL Right 7/10/2018    Procedure: RADIOFREQUENCY ABLATION, NERVE, MEDIAL BRANCH, LUMBAR, 1 LEVEL;  Surgeon: Lina Wagner MD;  Location: [facility] PAIN MGT;  Service: Pain Management;  Laterality: Right;  RIght Cooled RFA @ L2,3,4,5  [id number] with Sedation    2 of 2    TRIGGER FINGER RELEASE Right 2017    TRIGGER FINGER RELEASE Left 7/29/2019    Procedure: RELEASE, TRIGGER FINGER, Left Thumb;  Surgeon: Velma Garcia MD;  Location: [facility] OR;  Service: Orthopedics;  Laterality: Left;  Local w/ MAC       Family History:  Family History   Problem Relation Age of Onset    Diabetes Mother     Cataracts Mother     Diabetes Father     Cataracts Father     Hypertension Sister     Diabetes Sister     No Known Problems Sister     Cancer Sister         lymphoma     Coronary artery disease Brother     Diabetes Brother     Diabetes Brother     Hypotension Brother     Kidney failure Brother     Hypotension Brother     Amblyopia Neg Hx     Blindness Neg Hx     Glaucoma Neg Hx     Macular degeneration Neg Hx     Retinal detachment Neg Hx     Strabismus Neg Hx     Stroke Neg Hx     Thyroid disease Neg Hx     Anesthesia problems Neg Hx        Social History:  Social History     Socioeconomic History    Marital status:    Tobacco Use    Smoking status: Never    Smokeless tobacco: Never   Substance and Sexual Activity    Alcohol use: Not Currently     Comment: occasionally     Drug use: No    Sexual activity: Yes     Partners: Female     Birth control/protection: None   Social History Narrative    Disabled. The patient is the youngest of 6 siblings. Single. Lives with single-sex partner.                  Social  "Determinants of Health     Financial Resource Strain: Unknown (6/12/2023)    Overall Financial Resource Strain (CARDIA)     Difficulty of Paying Living Expenses: Patient refused   Food Insecurity: No Food Insecurity (6/12/2023)    Hunger Vital Sign     Worried About Running Out of Food in the Last Year: Never true     Ran Out of Food in the Last Year: Never true   Transportation Needs: No Transportation Needs (6/12/2023)    PRAPARE - Transportation     Lack of Transportation (Medical): No     Lack of Transportation (Non-Medical): No   Physical Activity: Unknown (6/12/2023)    Exercise Vital Sign     Days of Exercise per Week: Patient refused     Minutes of Exercise per Session: Patient refused   Stress: Stress Concern Present (6/12/2023)    [employer] - Occupational Stress Questionnaire     Feeling of Stress : Very much   Social Connections: Unknown (6/12/2023)    Social Connection and Isolation Panel [NHANES]     Frequency of Communication with Friends and Family: Twice a week     Frequency of Social Gatherings with Friends and Family: Never     Attends Christian Services: More than 4 times per year     Active Member of Clubs or Organizations: No     Attends Club or Organization Meetings: Never     Marital Status: Patient refused   Housing Stability: Unknown (6/12/2023)    Housing Stability Vital Sign     Unable to Pay for Housing in the Last Year: Patient refused     Unstable Housing in the Last Year: Patient refused       OBJECTIVE:    VITALS:    BP (!) 148/92   Pulse 77   Temp 98 °F (36.7 °C) (Oral)   Resp 18   Ht 5' 5" (1.651 m)   Wt 117.5 kg (259 lb 0.7 oz)   LMP 06/26/2018   SpO2 100%   BMI 43.11 kg/m²     PREVIOUS PHYSICAL EXAMINATION:    GENERAL: Well appearing, in no acute distress, alert and oriented x3.   PSYCH:  Mood and affect appropriate.  SKIN: Skin color, texture, turgor normal, no rashes or lesions.   HEAD/FACE:  Normocephalic, atraumatic.   CV: RRR with " palpation of the radial artery.  PULM: No evidence of respiratory difficulty, symmetric chest rise.  BACK: Negative SLR bilaterally. SLR is positive bilaterally at L5 distribution. There is pain with palpation over lumbar facet joints and paraspinals bilaterally. Limited ROM with pain on flexion and extension.  Positive facet loading bilaterally  EXTREMITIES: There is pain with palpation to bilateral SI joints.  JENNA is positive on the right.   MUSCULOSKELETAL: 4/5 strength in right ankle with plantar and dorsiflexion. 4/5 strength in left ankle with plantar and dorsiflexion. 5/5 strength with right knee flexion and extension. 5/5 strength with left knee flexion and extension. 5/5 strength in right EHL, 5/5 strength in left EHL.  NEURO:  Plantar response are downgoing. No clonus. Decreased sensation at a bilateral L5 and S1 distribution bilaterally.  GAIT: Antalgic- ambulates without assistance.      ASSESSMENT: 57 y.o. year old female with low back and knee pain, consistent with the followin. Chronic pain syndrome        2. Lumbar radiculopathy        3. DDD (degenerative disc disease), lumbar        4. Spinal stenosis of lumbar region, unspecified whether neurogenic claudication present        5. Sacroiliac joint pain                PLAN:     - Previous imaging reviewed today.    - She had some benefit with L5/S1 IL KERI that was short lived. Discussed TF KERI which she declined. Order placed for surgical consult.    - Continue Percocet 10/325 mg TID PRN. No refills needed at this time.     - The patient is here today for a refill of current pain medications and they believe these provide effective pain control and improvements in quality of life.  The patient notes no serious side effects, and feels the benefits outweigh the risks.  The patient was reminded of the pain contract that they signed previously as well as the risks and benefits of the medication including possible death.  The updated [place]  Board of Pharmacy prescription monitoring program was reviewed, and the patient has been found to be compliant with current treatment plan.     - Previous UDS reviewed.    - Will give 30 mg IM Toradol today. Avoid NSAIDs and steroids today. She can start medrol dose pack from PCP tomorrow.    - I have stressed the importance of physical activity and a home exercise plan to help with pain and improve health. She is scheduled to start PT.    - RTC in 1 month or sooner if needed.     - Counseled patient regarding the importance of activity modification and constant sleeping habits.    - Dr. Hurtado was consulted on the patient and agrees with this plan.      The above plan and management options were discussed at length with patient. Patient is in agreement with the above and verbalized understanding.    Virginia Sanchez  09/18/2023

## 2023-09-25 ENCOUNTER — CLINICAL SUPPORT (OUTPATIENT)
Dept: REHABILITATION | Facility: HOSPITAL | Age: 58
End: 2023-09-25
Attending: ORTHOPAEDIC SURGERY
Payer: MEDICARE

## 2023-09-25 DIAGNOSIS — G89.29 CHRONIC PAIN OF BOTH KNEES: ICD-10-CM

## 2023-09-25 DIAGNOSIS — M25.662 IMPAIRED RANGE OF MOTION OF BOTH KNEES: ICD-10-CM

## 2023-09-25 DIAGNOSIS — Z96.651 HISTORY OF REVISION OF TOTAL REPLACEMENT OF RIGHT KNEE JOINT: ICD-10-CM

## 2023-09-25 DIAGNOSIS — M25.661 IMPAIRED RANGE OF MOTION OF BOTH KNEES: ICD-10-CM

## 2023-09-25 DIAGNOSIS — R29.898 DECREASED STRENGTH OF LOWER EXTREMITY: ICD-10-CM

## 2023-09-25 DIAGNOSIS — M25.561 CHRONIC PAIN OF BOTH KNEES: ICD-10-CM

## 2023-09-25 DIAGNOSIS — M25.562 CHRONIC PAIN OF BOTH KNEES: ICD-10-CM

## 2023-09-25 DIAGNOSIS — Z74.09 IMPAIRED FUNCTIONAL MOBILITY, BALANCE, GAIT, AND ENDURANCE: ICD-10-CM

## 2023-09-25 PROCEDURE — 97162 PT EVAL MOD COMPLEX 30 MIN: CPT | Mod: PO

## 2023-09-25 NOTE — PLAN OF CARE
"[facility]   Physical Therapy Initial Evaluation      Name: Alivia Marie Cyr  Clinic Number: [id number]    Therapy Diagnosis:   Encounter Diagnoses   Name Primary?    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 She has had knee pain for many years. She has had bilateral knee replacement and 2 revisions on the right (last revision was 2014). She does not use assistive device for walking.    Falls: no recent falls but she reports her knees have been giving out a lot and she catches herself    Imaging: see chart     Prior Therapy: yes  Social History: lives with family, no stairs in home (1 step down)  Occupation: not working  Prior Level of Function: independent   Current Level of Function: Pain with any prolonged activity.     Pain:  Current 8/10, worst 10/10, best 4/10   Location: bilateral knees   Description: Aching and pins and needling, stabbing/shooting  Aggravating Factors: "doing anything for too long"  Easing Factors: medication, nerve blocks     Patients goals: to have something that helps , "I would like " "to be out of pain completely but that's not gonna happen" "at least be able to do things longer"     Medical History:   Past Medical History:   Diagnosis Date    Allergy     Anemia     Asthma     Bilateral shoulder bursitis     Cervical stenosis of spine     Chronic pain     DDD (degenerative disc disease), cervical     Depression 01/28/2019    Diabetes mellitus type II     DJD (degenerative joint disease) of knee 06/19/2014    Facet arthritis of lumbar region 12/17/2015    Facet syndrome 12/17/2015    GERD (gastroesophageal reflux disease)     Heartburn     Hyperlipidemia     Hypertension     Morbid obesity     Neuromuscular disorder     Nuclear sclerotic cataract of left eye 8/8/2023    PADDY (obstructive sleep apnea)     Proteinuria     Right carpal tunnel syndrome     Sacroiliitis 06/13/2018    Sacroiliitis     Sleep apnea     Uterine fibroid        Surgical History:   Alivia Thomas  has a past surgical history that includes Carpal tunnel release; Carpal tunnel release (1980s); Carpal tunnel release (2012); Knee surgery (3/2010); Knee surgery (6-19-14); Radiofrequency ablation of lumbar medial branch nerve at single level (Left, 6/26/2018); Radiofrequency ablation of lumbar medial branch nerve at single level (Right, 7/10/2018); Esophagogastroduodenoscopy (N/A, 3/27/2019); Radiofrequency ablation (Right, 7/9/2019); Joint replacement (Bilateral); Trigger finger release (Right, 2017); Trigger finger release (Left, 7/29/2019); Laparoscopic sleeve gastrectomy (N/A, 8/28/2019); Radiofrequency ablation (Left, 12/19/2019); Radiofrequency ablation (Right, 12/31/2019); Injection of joint (Bilateral, 6/9/2020); Colonoscopy (N/A, 6/15/2020); Radiofrequency ablation (Right, 10/13/2020); Radiofrequency ablation (Left, 11/3/2020); Radiofrequency ablation (Left, 12/15/2020); Radiofrequency ablation (Right, 12/29/2020); Injection of joint (Bilateral, 5/11/2021); Radiofrequency ablation (Left, 6/29/2021); Radiofrequency " ablation (Right, 7/13/2021); Radiofrequency ablation (Right, 8/24/2021); Radiofrequency ablation (Left, 9/11/2021); Radiofrequency ablation (Right, 3/7/2022); Radiofrequency ablation (Left, 3/21/2022); Radiofrequency ablation (Right, 5/9/2022); Radiofrequency ablation (Left, 5/23/2022); Panniculectomy (N/A, 6/14/2022); Radiofrequency ablation (Right, 12/12/2022); Radiofrequency ablation (Left, 1/9/2023); Radiofrequency ablation (Right, 3/6/2023); Radiofrequency ablation (Left, 5/22/2023); Epidural steroid injection (N/A, 7/24/2023); Cataract extraction w/  intraocular lens implant (Left, 8/8/2023); and Cataract extraction w/  intraocular lens implant (Right, 8/29/2023).    Medications:   Alivia has a current medication list which includes the following prescription(s): albuterol, allopurinol, atorvastatin, b complex vitamins, calcium citrate/vitamin d2, mitigare, cyanocobalamin, diclofenac sodium, fluoxetine, fluticasone propionate, indomethacin, lidocaine, methylprednisolone, multivit-iron-min-folic acid, nystatin, omeprazole, oxybutynin, oxycodone-acetaminophen, mounjaro, tizanidine, tretinoin microspheres, urea, and vitamin d, and the following Facility-Administered Medications: sodium chloride 0.9% and sodium chloride 0.9%.    Allergies:   Review of patient's allergies indicates:   Allergen Reactions    Strawberry Anaphylaxis        Objective      Observation: enters clinic independently without assistive device       Gait: antalgic      Active ROM: (measured in degrees)   Knee    Right 5-113   Left 0-115     Sensation: globally intact to light touch BLE    Lower Extremity Strength (graded 0-5 out of 5)    Right LE Left LE   Hip flexion: 4/5* 4/5*   Knee extension:  4-/5* >/=4/5*   Ankle dorsiflexion: 4+/5 4+/5   Posterior fibers of Gluteus Medius 3+/5* 3+/5*   Hip extension: NT NT   Knee flexion:  4+/5 5/5   *=pain    Poor quad set bilaterally, left better than right  Function:    Sit <--> Stand: requires UE  assistance, poor form  Five time sit to stand: 51 seconds from low mat with UE assistance. Poor form noted  Bed Mobility: independent but slow movements        Palpation: TTP @ right medial knee and quad tendon, left lateral knee and quad and patellar tendons      Five time sit to stand: 51 seconds from low mat with UE assistance. Poor form noted    Intake Outcome Measure for FOTO Knee Survey    Therapist reviewed FOTO scores for Alivia Thomas on 9/25/2023.   FOTO report - see Media section or FOTO account episode details.    Intake Score: 14         Treatment     none    Patient Education and Home Exercises     Education provided:   - PT role and POC      Assessment     Alivia is a 57 y.o. 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 Patient presents with hx of brittany knee replacement as well as revision of right knee. She presents with impaired LE strength, impaired knee ROM, poor functional mobility and endurance, and pain affecting functional capacity. Will benefit from progressive strengthening to improve function.    Patient prognosis is Fair.   Patient will benefit from skilled outpatient Physical Therapy to address the deficits stated above and in the chart below, provide patient /family education, and to maximize patientt's level of independence.     Plan of care discussed with patient: Yes  Patient's spiritual, cultural and educational needs considered and patient is agreeable to the plan of care and goals as stated below:     Anticipated Barriers for therapy: chronicity of symptoms and co-morbidities     Medical Necessity is demonstrated by the following  History  Co-morbidities and personal factors that may impact the plan of care [] LOW: no personal factors / co-morbidities  [] MODERATE: 1-2 personal factors /  co-morbidities  [x] HIGH: 3+ personal factors / co-morbidities    Moderate / High Support Documentation:   Co-morbidities affecting plan of care: PmHx    Personal Factors:   no deficits     Examination  Body Structures and Functions, activity limitations and participation restrictions that may impact the plan of care [] LOW: addressing 1-2 elements  [x] MODERATE: 3+ elements  [] HIGH: 4+ elements (please support below)    Moderate / High Support Documentation: ROM, strength, endurance      Clinical Presentation [] LOW: stable  [x] MODERATE: Evolving  [] HIGH: Unstable     Decision Making/ Complexity Score: moderate       Goals    Short Term Goals: 4 weeks   1. Pt will report </= 5/10 pain at worst in brittany knee to improve tolerance to exercise.   2. Pt will improve MMT B LE musculature by 1/2 grade to improve function.   3. Pt will be independent with HEP to promote return to recreational activities.   4.  Patient will improve 5 time sit to stand to </=30 seconds in order to improve safety, transfers, and functional independence.    Long Term Goals: 8 weeks  1. Pt will report </= 3/10 pain in brittany knees to improve tolerance to exercise .  2. Pt will improve MMT B LE musculature by 1 muscle grade to improve functional mobility.  3. Pt will be independent with HEP progressions to promote return to recreational activities.  4.  Pt improve impaired knee ROM to WNL in all planes to improve mobility for normal movement patterns.   5. Patient will improve 5 time sit to stand to </=20 seconds in order to improve safety, transfers, and functional independence.  5. Patient will perform sit <> stand with min VC for proper technique from PT in order to encourage independence in transfers.    Plan     Plan of care Certification: 9/25/2023 to 11/20/23.    Outpatient Physical Therapy 2 times weekly for 8 weeks to include the following interventions: Gait Training, Manual Therapy, Moist Heat/ Ice, Neuromuscular Re-ed, Patient  Education, Self Care, Therapeutic Activities, Therapeutic Exercise, and modalities as appropriate.     Rosalind Robertson, PT, DPT        Physician's Signature: _________________________________________ Date: ________________

## 2023-09-27 ENCOUNTER — OFFICE VISIT (OUTPATIENT)
Dept: OPHTHALMOLOGY | Facility: CLINIC | Age: 58
End: 2023-09-27
Payer: MEDICARE

## 2023-09-27 DIAGNOSIS — Z98.890 POST-OPERATIVE STATE: Primary | ICD-10-CM

## 2023-09-27 DIAGNOSIS — H52.7 REFRACTIVE ERROR: ICD-10-CM

## 2023-09-27 PROCEDURE — 99999 PR PBB SHADOW E&M-EST. PATIENT-LVL III: ICD-10-PCS | Mod: PBBFAC,,, | Performed by: OPHTHALMOLOGY

## 2023-09-27 PROCEDURE — 99999 PR PBB SHADOW E&M-EST. PATIENT-LVL III: CPT | Mod: PBBFAC,,, | Performed by: OPHTHALMOLOGY

## 2023-09-27 PROCEDURE — 99024 PR POST-OP FOLLOW-UP VISIT: ICD-10-PCS | Mod: POP,,, | Performed by: OPHTHALMOLOGY

## 2023-09-27 PROCEDURE — 99213 OFFICE O/P EST LOW 20 MIN: CPT | Mod: PBBFAC | Performed by: OPHTHALMOLOGY

## 2023-09-27 PROCEDURE — 99024 POSTOP FOLLOW-UP VISIT: CPT | Mod: POP,,, | Performed by: OPHTHALMOLOGY

## 2023-09-27 NOTE — PROGRESS NOTES
Subjective:       Patient ID: Alivia Thomas is a 57 y.o. female.    Chief Complaint: Post-op Evaluation    HPI    58 y/o female here for 3 week Post Op OU  Pt state no complaints at this time     Last edited by Zuleyma Rosas on 9/27/2023  1:43 PM.             Assessment:       1. Post-operative state    2. Refractive error        Plan:       S/p CE OU- Doing well.   RE-Pt does not want MRx.      Readers.  RTC Dr Dodson in 6 mos.

## 2023-09-29 NOTE — TELEPHONE ENCOUNTER
----- Message from Leeanne Silva MD sent at 12/3/2017  8:43 PM CST -----  Please call patient to inform her of benign endometrial biopsy.  Please assure that patient keeps her follow-up appointment if scheduled or that she has a follow up appointment in 3 months if therapy has been initiated.  If patient has further questions, please forward information to me.       
I left a message for the pt to call the office back to inform her that her biopsy came back negative and she needs to follow up in three months  
FAMILY HISTORY:  Father  Still living? Unknown  Family history of diabetes mellitus (DM), Age at diagnosis: Age Unknown  FH: HTN (hypertension), Age at diagnosis: Age Unknown    Mother  Still living? Unknown  Family history of DVT, Age at diagnosis: Age Unknown    Sibling  Still living? No  Family history of DVT, Age at diagnosis: Age Unknown  FH: colon cancer, Age at diagnosis: Age Unknown

## 2023-10-02 ENCOUNTER — OFFICE VISIT (OUTPATIENT)
Dept: NEUROSURGERY | Facility: CLINIC | Age: 58
End: 2023-10-02
Payer: MEDICARE

## 2023-10-02 VITALS
HEART RATE: 70 BPM | HEIGHT: 65 IN | WEIGHT: 250 LBS | SYSTOLIC BLOOD PRESSURE: 107 MMHG | BODY MASS INDEX: 41.65 KG/M2 | DIASTOLIC BLOOD PRESSURE: 71 MMHG | TEMPERATURE: 98 F

## 2023-10-02 DIAGNOSIS — M51.36 DDD (DEGENERATIVE DISC DISEASE), LUMBAR: Primary | Chronic | ICD-10-CM

## 2023-10-02 DIAGNOSIS — G89.4 CHRONIC PAIN SYNDROME: Chronic | ICD-10-CM

## 2023-10-02 DIAGNOSIS — M54.16 LUMBAR RADICULOPATHY: ICD-10-CM

## 2023-10-02 DIAGNOSIS — M47.816 LUMBAR SPONDYLOSIS: ICD-10-CM

## 2023-10-02 DIAGNOSIS — M47.817 SPONDYLOSIS OF LUMBOSACRAL REGION WITHOUT MYELOPATHY OR RADICULOPATHY: ICD-10-CM

## 2023-10-02 DIAGNOSIS — M54.9 DORSALGIA, UNSPECIFIED: ICD-10-CM

## 2023-10-02 DIAGNOSIS — G89.4 CHRONIC PAIN SYNDROME: ICD-10-CM

## 2023-10-02 PROCEDURE — 99204 OFFICE O/P NEW MOD 45 MIN: CPT | Mod: S$PBB,,, | Performed by: NEUROLOGICAL SURGERY

## 2023-10-02 PROCEDURE — 99999 PR PBB SHADOW E&M-EST. PATIENT-LVL V: ICD-10-PCS | Mod: PBBFAC,,, | Performed by: NEUROLOGICAL SURGERY

## 2023-10-02 PROCEDURE — 99999 PR PBB SHADOW E&M-EST. PATIENT-LVL V: CPT | Mod: PBBFAC,,, | Performed by: NEUROLOGICAL SURGERY

## 2023-10-02 PROCEDURE — 99204 PR OFFICE/OUTPT VISIT, NEW, LEVL IV, 45-59 MIN: ICD-10-PCS | Mod: S$PBB,,, | Performed by: NEUROLOGICAL SURGERY

## 2023-10-02 PROCEDURE — 99215 OFFICE O/P EST HI 40 MIN: CPT | Mod: PBBFAC | Performed by: NEUROLOGICAL SURGERY

## 2023-10-02 NOTE — PROGRESS NOTES
Neurosurgery  History & Physical    SUBJECTIVE:     Chief Complaint:  Low back pain and bilateral leg pain.    History of Present Illness:  Ms. Thomas is a 57-year-old female who was referred to me by Virginia Sanchez NP.  Her past medical history is significant for allergy, anemia, asthma, bilateral shoulder bursitis, depression, diabetes, GERD, hyperlipidemia, hypertension, morbid obesity, cataracts, obstructive sleep apnea, carpal tunnel syndrome, and uterine fibroids.  She complains of a 1-1/2 month history of low back pain.  This pain radiates down the posterior aspect of her legs to her knees.  She also complains of bilateral knee pain.  She denies any pain that radiates past her knees.  Her back pain is equal to her leg pain.  Lying down, sitting, standing, walking, and bending forward makes her pain worse.  Essentially, staying in any 1 position for an extended period of time exacerbates her pain.  Nothing really seems to make her pain better although steroids do help somewhat.  She is had physical therapy and epidural steroid injections which helped somewhat.  Most recently, she underwent an L5-S1 interlaminar KERI which brought her about 50% relief of her pain for a short period of time.  She denies any lower extremity weakness.  She denies any bowel or bladder incontinence.    Review of patient's allergies indicates:   Allergen Reactions    Strawberry Anaphylaxis       Current Outpatient Medications   Medication Sig Dispense Refill    albuterol (VENTOLIN HFA) 90 mcg/actuation inhaler INHALE TWO PUFFS INTO LUNGS EVERY 4 TO 6 HOURS AS NEEDED FOR SHORTNESS OF BREATH AND FOR WHEEZING 18 g 1    allopurinoL (ZYLOPRIM) 300 MG tablet Take 1 tablet (300 mg total) by mouth 2 (two) times daily. 180 tablet 3    atorvastatin (LIPITOR) 20 MG tablet TAKE 1 TABLET (20 MG TOTAL) BY MOUTH ONCE DAILY. 90 tablet 2    b complex vitamins tablet Take 1 tablet by mouth every other day.       calcium citrate/vitamin D2  (ISIAH-CITRATE ORAL) Take 500 mg by mouth 2 (two) times daily.      colchicine, gout, (MITIGARE) 0.6 mg Cap TAKE 1 CAPSULE (0.6 MG TOTAL) BY MOUTH DAILY AS NEEDED (FLARE UP). 90 capsule 1    cyanocobalamin (VITAMIN B-12) 1000 MCG tablet Take 100 mcg by mouth every morning.      diclofenac sodium (VOLTAREN) 1 % Gel Apply 2 g topically 4 (four) times daily as needed. To painful area on the feet. 500 g 5    FLUoxetine (PROZAC) 20 mg/5 mL (4 mg/mL) solution TAKE 5 MLS BY MOUTH ONCE DAILY. 450 mL 3    fluticasone propionate (FLONASE) 50 mcg/actuation nasal spray 1 SPRAY BY EACH NOSTRIL ROUTE 2 TIMES DAILY AS NEEDED FOR RHINITIS. 48 g 3    indomethacin (INDOCIN) 50 MG capsule TAKE 1 CAPSULE BY MOUTH 2 TIMES DAILY WITH MEALS 30 capsule 2    LIDOcaine (XYLOCAINE) 5 % Oint ointment APPLY TOPICALLY AS NEEDED. 35.44 g 6    MULTIVIT-IRON-MIN-FOLIC ACID 3,500-18-0.4 UNIT-MG-MG ORAL CHEW Take 1 tablet by mouth 2 (two) times daily.       nystatin (MYCOSTATIN) powder Apply topically 2 (two) times daily. 60 g 3    omeprazole (PRILOSEC) 20 MG capsule TAKE 1 CAPSULE (20 MG TOTAL) BY MOUTH EVERY MORNING. 90 capsule 2    oxybutynin (DITROPAN-XL) 5 MG TR24 TAKE 1 TABLET BY MOUTH ONCE DAILY. 90 tablet 3    oxyCODONE-acetaminophen (PERCOCET)  mg per tablet Take 1 tablet by mouth every 8 (eight) hours as needed for Pain. 90 tablet 0    tirzepatide (MOUNJARO) 2.5 mg/0.5 mL PnIj Inject 2.5 mg into the skin every 7 days. 4 pen 2    tiZANidine (ZANAFLEX) 4 MG tablet Take 1 tablet (4 mg total) by mouth every 8 (eight) hours as needed (muscle pain). 90 tablet 2    urea (CARMOL) 40 % Crea Apply topically once daily. To dry skin on the feet. 120 g 5    vitamin D 185 MG Tab Take 5,000 mg by mouth every morning.       methylPREDNISolone (MEDROL, MICKI,) 4 mg tablet use as directed (Patient not taking: Reported on 10/2/2023) 1 each 0    tretinoin microspheres (RETIN-A MICRO PUMP) 0.08 % GlwP Apply to affected area daily (Patient not taking: Reported  on 10/2/2023) 50 g 0     No current facility-administered medications for this visit.     Facility-Administered Medications Ordered in Other Visits   Medication Dose Route Frequency Provider Last Rate Last Admin    0.9%  NaCl infusion   Intravenous Continuous Lina Wagner MD 25 mL/hr at 12/15/20 1506 25 mL/hr at 12/15/20 1506    0.9%  NaCl infusion   Intravenous Continuous Ciro Chung MD           Past Medical History:   Diagnosis Date    Allergy     Anemia     Asthma     Bilateral shoulder bursitis     Cervical stenosis of spine     Chronic pain     DDD (degenerative disc disease), cervical     Depression 01/28/2019    Diabetes mellitus type II     DJD (degenerative joint disease) of knee 06/19/2014    Facet arthritis of lumbar region 12/17/2015    Facet syndrome 12/17/2015    GERD (gastroesophageal reflux disease)     Heartburn     Hyperlipidemia     Hypertension     Morbid obesity     Neuromuscular disorder     Nuclear sclerotic cataract of left eye 8/8/2023    PADDY (obstructive sleep apnea)     Proteinuria     Right carpal tunnel syndrome     Sacroiliitis 06/13/2018    Sacroiliitis     Sleep apnea     Uterine fibroid      Past Surgical History:   Procedure Laterality Date    CARPAL TUNNEL RELEASE      CARPAL TUNNEL RELEASE  1980s    left    CARPAL TUNNEL RELEASE  2012    right    CATARACT EXTRACTION W/  INTRAOCULAR LENS IMPLANT Left 8/8/2023    Procedure: EXTRACTION, CATARACT, WITH IOL INSERTION;  Surgeon: Justin Block MD;  Location: [facility] OR;  Service: Ophthalmology;  Laterality: Left;    CATARACT EXTRACTION W/  INTRAOCULAR LENS IMPLANT Right 8/29/2023    Procedure: EXTRACTION, CATARACT, WITH IOL INSERTION;  Surgeon: Justin Block MD;  Location: [facility] OR;  Service: Ophthalmology;  Laterality: Right;    COLONOSCOPY N/A 6/15/2020    Procedure: COLONOSCOPY;  Surgeon: Jc Koo MD;  Location: [facility] ENDO [address]);  Service: Endoscopy;  Laterality: N/A;  COVID screening on 6/13/20  at [facility]  pt updated on drop off location and no visitor [facility]    EPIDURAL STEROID INJECTION N/A 7/24/2023    Procedure: INJECTION, STEROID, EPIDURAL, L5/S1 SOONER DATE;  Surgeon: Israel Hurtado MD;  Location: [facility] PAIN MGT;  Service: Pain Management;  Laterality: N/A;    ESOPHAGOGASTRODUODENOSCOPY N/A 3/27/2019    Procedure: EGD (ESOPHAGOGASTRODUODENOSCOPY);  Surgeon: Robbin Bar MD;  [facility]  Service: Endoscopy;  Laterality: N/A;  BMI 61.3/2nd floor case/svn    INJECTION OF JOINT Bilateral 6/9/2020    Procedure: INJECTION, JOINT, BILATERAL SI;  Surgeon: Lina Wagner MD;  Location: [facility] PAIN T;  Service: Pain Management;  Laterality: Bilateral;  B/L SI Joint Injection    INJECTION OF JOINT Bilateral 5/11/2021    Procedure: INJECTION, JOINT, SACROILIAC (SI) need consent;  Surgeon: Lina Wagner MD;  Location: [facility] PAIN T;  Service: Pain Management;  Laterality: Bilateral;    JOINT REPLACEMENT Bilateral     with 2 revisions on rt    KNEE SURGERY  3/2010    orthroscope    KNEE SURGERY  6-19-14    left TKR    LAPAROSCOPIC SLEEVE GASTRECTOMY N/A 8/28/2019    Procedure: GASTRECTOMY, SLEEVE, LAPAROSCOPIC with intraop EGD;  Surgeon: Heriberto Clements MD;  Location: [facility] OR [address] [place] [address];  Service: General;  Laterality: N/A;    PANNICULECTOMY N/A 6/14/2022    Procedure: PANNICULECTOMY;  Surgeon: Rhett Gray MD;  Location: [facility] OR [address] [place] [address];  Service: Plastics;  Laterality: N/A;    RADIOFREQUENCY ABLATION Right 7/9/2019    Procedure: RADIOFREQUENCY ABLATION;  Surgeon: Lina Wagner MD;  Location: [facility] PAIN MGT;  Service: Pain Management;  Laterality: Right;  RIGHT RFA L2,3,4,5  2 of 2    RADIOFREQUENCY ABLATION Left 12/19/2019    Procedure: RADIOFREQUENCY ABLATION, LEFT L2-L3-L4-L5 MEDIAL BRANCH 1 OF 2;  Surgeon: Lina Wagner MD;  Location: [facility] PAIN MGT;  Service: Pain Management;  Laterality: Left;    RADIOFREQUENCY ABLATION Right 12/31/2019     Procedure: RADIOFREQUENCY ABLATION, RIGHT L2-L3-L4-L5  2 OF 2;  Surgeon: Lina Wagner MD;  Location: [facility] PAIN MGT;  Service: Pain Management;  Laterality: Right;    RADIOFREQUENCY ABLATION Right 10/13/2020    Procedure: RADIOFREQUENCY ABLATION, Genicular Cooled 1 of 2;  Surgeon: Lina Wagner MD;  Location: [facility] PAIN MGT;  Service: Pain Management;  Laterality: Right;    RADIOFREQUENCY ABLATION Left 11/3/2020    Procedure: RADIOFREQUENCY ABLATION, GENICULAR COOLED  2 OF 2;  Surgeon: Lina Wagner MD;  Location: [facility] PAIN MGT;  Service: Pain Management;  Laterality: Left;    RADIOFREQUENCY ABLATION Left 12/15/2020    Procedure: RADIOFREQUENCY ABLATION LEFT L2,3,4,5 1 of 2;  Surgeon: Lina Wagner MD;  Location: [facility] PAIN MGT;  Service: Pain Management;  Laterality: Left;    RADIOFREQUENCY ABLATION Right 12/29/2020    Procedure: RADIOFREQUENCY ABLATION RIGHT L2,3,4,5 2 of 2;  Surgeon: Lina Wagner MD;  Location: [facility] PAIN MGT;  Service: Pain Management;  Laterality: Right;    RADIOFREQUENCY ABLATION Left 6/29/2021    Procedure: RADIOFREQUENCY ABLATION GENICULAR COOLED;  Surgeon: Lina Wagner MD;  Location: [facility] PAIN MGT;  Service: Pain Management;  Laterality: Left;  1 of 2    RADIOFREQUENCY ABLATION Right 7/13/2021    Procedure: RADIOFREQUENCY ABLATION GENICULAR;  Surgeon: Lina Wagner MD;  Location: [facility] PAIN MGT;  Service: Pain Management;  Laterality: Right;  2 of 2    RADIOFREQUENCY ABLATION Right 8/24/2021    Procedure: RADIOFREQUENCY ABLATION, L2-L3-L4-L5 MEDIAL BRANCH 1 OF 2;  Surgeon: Lina Wagner MD;  Location: [facility] PAIN MGT;  Service: Pain Management;  Laterality: Right;    RADIOFREQUENCY ABLATION Left 9/11/2021    Procedure: RADIOFREQUENCY ABLATION, L2-L3-L4-L5 MEDIAL BR ANCH 2 OF 2;  Surgeon: Lina Wagner MD;  Location: [facility] PAIN MGT;  Service: Pain Management;  Laterality: Left;    RADIOFREQUENCY ABLATION Right 3/7/2022    Procedure: RADIOFREQUENCY  ABLATION RIGHT L3,4,5 1 of 2, needs consent;  Surgeon: Israel Hurtado MD;  Location: [facility] PAIN MGT;  Service: Pain Management;  Laterality: Right;    RADIOFREQUENCY ABLATION Left 3/21/2022    Procedure: RADIOFREQUENCY ABLATION RIGHT L3,4,5 2 of 2, needs consent;  Surgeon: Israel Hurtado MD;  Location: [facility] PAIN MGT;  Service: Pain Management;  Laterality: Left;    RADIOFREQUENCY ABLATION Right 5/9/2022    Procedure: RADIOFREQUENCY ABLATION RIGHT GENICULAR COOLED 1 of 2;  Surgeon: Israel Hurtado MD;  Location: [facility] PAIN MGT;  Service: Pain Management;  Laterality: Right;    RADIOFREQUENCY ABLATION Left 5/23/2022    Procedure: RADIOFREQUENCY ABLATION LEFT GENICULAR COOLED  2 of 2;  Surgeon: Israel Hurtado MD;  Location: [facility] PAIN MGT;  Service: Pain Management;  Laterality: Left;    RADIOFREQUENCY ABLATION Right 12/12/2022    Procedure: RADIOFREQUENCY ABLATION RIGHT GENICULAR COOLED  ONE OF TWO  NEEDS CONSENT;  Surgeon: Israel Hurtado MD;  Location: [facility] PAIN MGT;  Service: Pain Management;  Laterality: Right;    RADIOFREQUENCY ABLATION Left 1/9/2023    Procedure: RADIOFREQUENCY ABLATION LEFT GENICULAR COOLED  TWO OF TWO NEEDS CONSENT;  Surgeon: Israel Hurtado MD;  Location: [facility] PAIN MGT;  Service: Pain Management;  Laterality: Left;    RADIOFREQUENCY ABLATION Right 3/6/2023    Procedure: RADIOFREQUENCY ABLATION RIGHT L3,L4,L5;  Surgeon: Israel Hurtado MD;  Location: [facility] PAIN MGT;  Service: Pain Management;  Laterality: Right;    RADIOFREQUENCY ABLATION Left 5/22/2023    Procedure: RADIOFREQUENCY ABLATION LEFT L3,L4,L5;  Surgeon: Israel Hurtado MD;  Location: [facility] PAIN MGT;  Service: Pain Management;  Laterality: Left;  4/3 LM TO R/S    RADIOFREQUENCY ABLATION OF LUMBAR MEDIAL BRANCH NERVE AT SINGLE LEVEL Left 6/26/2018    Procedure: RADIOFREQUENCY ABLATION, NERVE, MEDIAL BRANCH, LUMBAR, 1 LEVEL;  Surgeon: Lina Wagner MD;  Location: [facility] PAIN MGT;  Service: Pain Management;  Laterality: Left;  Left Cooled RFA @  L2,3,4,5  [id number]  with Sedation    1 of 2    RADIOFREQUENCY ABLATION OF LUMBAR MEDIAL BRANCH NERVE AT SINGLE LEVEL Right 7/10/2018    Procedure: RADIOFREQUENCY ABLATION, NERVE, MEDIAL BRANCH, LUMBAR, 1 LEVEL;  Surgeon: Lina Wagner MD;  Location: [facility] PAIN MGT;  Service: Pain Management;  Laterality: Right;  RIght Cooled RFA @ L2,3,4,5  [id number] with Sedation    2 of 2    TRIGGER FINGER RELEASE Right 2017    TRIGGER FINGER RELEASE Left 7/29/2019    Procedure: RELEASE, TRIGGER FINGER, Left Thumb;  Surgeon: Velma Garcia MD;  Location: [facility] OR;  Service: Orthopedics;  Laterality: Left;  Local w/ MAC     Family History       Problem Relation (Age of Onset)    Cancer Sister    Cataracts Mother, Father    Coronary artery disease Brother    Diabetes Mother, Father, Sister, Brother, Brother    Hypertension Sister    Hypotension Brother, Brother    Kidney failure Brother    No Known Problems Sister          Social History     Socioeconomic History    Marital status:    Tobacco Use    Smoking status: Never    Smokeless tobacco: Never   Substance and Sexual Activity    Alcohol use: Not Currently     Comment: occasionally     Drug use: No    Sexual activity: Yes     Partners: Female     Birth control/protection: None   Social History Narrative    Disabled. The patient is the youngest of 6 siblings. Single. Lives with single-sex partner.                  Social Determinants of Health     Financial Resource Strain: Unknown (6/12/2023)    Overall Financial Resource Strain (CARDIA)     Difficulty of Paying Living Expenses: Patient refused   Food Insecurity: No Food Insecurity (6/12/2023)    Hunger Vital Sign     Worried About Running Out of Food in the Last Year: Never true     Ran Out of Food in the Last Year: Never true   Transportation Needs: No Transportation Needs (6/12/2023)    PRAPARE - Transportation     Lack of Transportation (Medical): No     Lack of Transportation (Non-Medical): No    Physical Activity: Unknown (6/12/2023)    Exercise Vital Sign     Days of Exercise per Week: Patient refused     Minutes of Exercise per Session: Patient refused   Stress: Stress Concern Present (6/12/2023)    [employer] [place] [employer] - Occupational Stress Questionnaire     Feeling of Stress : Very much   Social Connections: Unknown (6/12/2023)    Social Connection and Isolation Panel [NHANES]     Frequency of Communication with Friends and Family: Twice a week     Frequency of Social Gatherings with Friends and Family: Never     Attends Rastafari Services: More than 4 times per year     Active Member of Clubs or Organizations: No     Attends Club or Organization Meetings: Never     Marital Status: Patient refused   Housing Stability: Unknown (6/12/2023)    Housing Stability Vital Sign     Unable to Pay for Housing in the Last Year: Patient refused     Unstable Housing in the Last Year: Patient refused       Review of Systems   Constitutional:  Positive for appetite change, fatigue and unexpected weight change. Negative for activity change, diaphoresis and fever.   HENT:  Negative for hearing loss, nosebleeds, tinnitus and trouble swallowing.    Eyes:  Negative for visual disturbance.   Respiratory:  Negative for cough, shortness of breath and wheezing.    Cardiovascular:  Negative for chest pain and palpitations.   Gastrointestinal:  Negative for abdominal distention, abdominal pain, blood in stool, constipation, diarrhea, nausea and vomiting.   Endocrine: Positive for cold intolerance. Negative for heat intolerance.   Genitourinary:  Negative for difficulty urinating, dysuria, frequency and urgency.   Musculoskeletal:  Positive for back pain, gait problem and neck pain. Negative for joint swelling, myalgias and neck stiffness.   Skin:  Negative for color change, rash and wound.   Allergic/Immunologic: Negative for environmental allergies and food allergies.   Neurological:  Positive for numbness  "and headaches. Negative for dizziness, seizures, facial asymmetry, speech difficulty, weakness and light-headedness.   Hematological:  Does not bruise/bleed easily.   Psychiatric/Behavioral:  Negative for agitation, behavioral problems, dysphoric mood and hallucinations. The patient is nervous/anxious.        OBJECTIVE:     Vital Signs  Temp: 97.7 °F (36.5 °C)  Pulse: 70  BP: 107/71  Pain Score:   8  Height: 5' 5" (165.1 cm)  Weight: 113.4 kg (250 lb) (per pt)  Body mass index is 41.6 kg/m².      Physical Exam:  Vitals reviewed.    Constitutional: She appears well-developed and well-nourished. No distress.     Eyes: Pupils are equal, round, and reactive to light. Conjunctivae and EOM are normal.     Cardiovascular: Normal rate, regular rhythm, normal pulses and no edema.     Abdominal: Soft. Bowel sounds are normal.     Skin: Skin displays no rash on trunk and no rash on extremities. Skin displays no lesions on trunk and no lesions on extremities.     Psych/Behavior: She is alert. She is oriented to person, place, and time. She has a normal mood and affect.     Musculoskeletal: Gait is normal.        Neck: Range of motion is full. There is no tenderness. Muscle strength is 5/5. Tone is normal.        Back: Range of motion is full. There is no tenderness. Muscle strength is 5/5. Tone is normal.        Right Upper Extremities: Range of motion is full. There is no tenderness. Muscle strength is 5/5. Tone is normal.        Left Upper Extremities: Range of motion is full. There is no tenderness. Muscle strength is 5/5. Tone is normal.       Right Lower Extremities: Range of motion is full. There is no tenderness. Muscle strength is 5/5. Tone is normal.        Left Lower Extremities: Range of motion is full. There is no tenderness. Muscle strength is 5/5. Tone is normal.     Neurological:        Coordination: She has a normal Romberg Test, normal finger to nose coordination and normal tandem walking coordination.        " Sensory: There is no sensory deficit in the trunk. There is no sensory deficit in the extremities.        DTRs: DTRs are DTRS NORMAL AND SYMMETRICnormal and symmetric. She displays no Babinski's sign on the right side. She displays no Babinski's sign on the left side.        Cranial nerves: Cranial nerve(s) II, III, IV, V, VI, VII, VIII, IX, X, XI and XII are intact.         Diagnostic Results:  She has an MRI of the lumbar spine available for review which I personally reviewed.  This shows L3 on L4 anterolisthesis as well as L4 on L5 anterolisthesis.  At L3-4, there is bilateral facet arthropathy and ligamental thickening contributing to severe central stenosis as well as bilateral neural foraminal narrowing.  At L4-5, there is bilateral facet arthropathy and ligamental thickening contributing to severe central canal stenosis and bilateral neural foraminal narrowing.    ASSESSMENT/PLAN:     Ms. Thomas is a 57-year-old female who complains of pain in her back and her legs as described above.  Her imaging studies show multilevel lumbar spondylosis including an L3 on L4 and L4 on L5 spondylolisthesis.  Before proceeding with any surgical recommendations, I would like to get a CT scan of the lumbar spine to evaluate the bony anatomy.  I would also like to get flexion-extension x-rays of the lumbar spine to evaluate for any abnormal movement at the L3-4 and L4-5 levels.  Once the studies are done, I will see the patient back in follow-up and will make further treatment plan at that time.  She knows she can call with any further questions or concerns in the meantime.        Note dictated with voice recognition software, please excuse any grammatical errors.

## 2023-10-02 NOTE — TELEPHONE ENCOUNTER
Patient requesting refill on oxyCODONE-acetaminophen (PERCOCET)  mg   Last office visit 09-18/23   shows last refill on 09-13-23  Patient does not have a pain contract on file with [facility] department  Patient last UDS 08/14/23 was not consistent with current therapy

## 2023-10-03 RX ORDER — OXYCODONE AND ACETAMINOPHEN 10; 325 MG/1; MG/1
1 TABLET ORAL EVERY 8 HOURS PRN
Qty: 90 TABLET | Refills: 0 | Status: SHIPPED | OUTPATIENT
Start: 2023-10-03 | End: 2023-11-08 | Stop reason: SDUPTHER

## 2023-10-04 ENCOUNTER — PATIENT MESSAGE (OUTPATIENT)
Dept: BARIATRICS | Facility: CLINIC | Age: 58
End: 2023-10-04
Payer: MEDICARE

## 2023-10-04 ENCOUNTER — TELEPHONE (OUTPATIENT)
Dept: NEUROSURGERY | Facility: CLINIC | Age: 58
End: 2023-10-04
Payer: MEDICARE

## 2023-10-04 NOTE — TELEPHONE ENCOUNTER
Spoke w/ pt. She wants to keep [facility] imaging and appt for 10/23. Pt said she would reschedule Ortho appt.

## 2023-10-10 ENCOUNTER — PATIENT MESSAGE (OUTPATIENT)
Dept: BARIATRICS | Facility: CLINIC | Age: 58
End: 2023-10-10
Payer: MEDICARE

## 2023-10-11 ENCOUNTER — CLINICAL SUPPORT (OUTPATIENT)
Dept: REHABILITATION | Facility: HOSPITAL | Age: 58
End: 2023-10-11
Payer: MEDICARE

## 2023-10-11 DIAGNOSIS — R29.898 DECREASED STRENGTH OF LOWER EXTREMITY: ICD-10-CM

## 2023-10-11 DIAGNOSIS — M25.661 IMPAIRED RANGE OF MOTION OF BOTH KNEES: Primary | ICD-10-CM

## 2023-10-11 DIAGNOSIS — Z74.09 IMPAIRED FUNCTIONAL MOBILITY, BALANCE, GAIT, AND ENDURANCE: ICD-10-CM

## 2023-10-11 DIAGNOSIS — M25.662 IMPAIRED RANGE OF MOTION OF BOTH KNEES: Primary | ICD-10-CM

## 2023-10-11 PROCEDURE — 97116 GAIT TRAINING THERAPY: CPT | Mod: PO

## 2023-10-11 PROCEDURE — 97140 MANUAL THERAPY 1/> REGIONS: CPT | Mod: PO

## 2023-10-11 PROCEDURE — 97110 THERAPEUTIC EXERCISES: CPT | Mod: PO

## 2023-10-11 NOTE — PROGRESS NOTES
[facility]   Physical Therapy Treatment Note      Name: Alivia Marie Cyr  Clinic Number: [id number]    Therapy Diagnosis:   Encounter Diagnoses   Name Primary?    Impaired range of motion of both knees Yes    Decreased strength of lower extremity     Impaired functional mobility, balance, gait, and endurance      Physician: Theodore Swan MD    Visit Date: 10/11/2023    Physician Orders: PT Eval and Treat   Medical Diagnosis from Referral:   M25.561,M25.562,G89.29 (ICD-10-CM) - Chronic pain of both knees   Z96.651 (ICD-10-CM) - History of revision of total replacement of right knee joint      Evaluation Date: 9/25/2023  Authorization Period Expiration: 10/5/23  Plan of Care Expiration: 11/20/23  Progress Note Due: 10/25/23  Date of Surgery: see chart  Visit # / Visits authorized: 1/ 1 + 1/20  FOTO: 1/ 3     Precautions: Standard      Time In: 705 am  Time Out: 800  Total Billable Time: 55 minutes    PTA Visit #: 0/5       Subjective     Patient reports: feeling bad today, both knees give way but she has been able to catch herself before she fell.  She was compliant with home exercise program.  Response to previous treatment: no increase in soreness  Functional change: in progress    Pain: 8/10  Location: bilateral      Objective      Objective Measures updated at progress report unless specified.     Treatment     Alivia received the treatments listed below:      therapeutic exercises to develop strength, endurance, ROM, flexibility, posture, and core stabilization for 30 minutes including: - increased rest time needed due to discomfort  Education (  Nu Step 6' L1  Quad sets X 20 with 3 second hold  Attempted straight leg raise - 10X each side with increased knee pain present  LAQ edge of table X 20 with eccentric lowering focus  Attempted bridges- unable to perform secondary to back pain  Low trunk rotations X 20 each direction    manual therapy techniques: Joint mobilizations,  Myofacial release, Soft tissue Mobilization, and Friction Massage were applied to the: bilateral knees for 15 minutes, including:  STM to ITB and calf and hamstrings at the posterior knee joint  Patellar mobilizations in all directions bilaterally  CFM to infrapatellar fat pads bilaterally    neuromuscular re-education activities to improve: Balance, Coordination, Kinesthetic, Sense, Proprioception, and Posture for 00 minutes. The following activities were included:  NP    therapeutic activities to improve functional performance for 00  minutes, including:  NP    gait training to improve functional mobility and safety for 08  minutes, including:  Gait training with SPC in order to prevent lateral trunk shifting with gait and to decrease stress on the spine        Patient Education and Home Exercises       Education provided:   - educated on importance of spinal protection as we strengthen the LEs and use of cane to prevent increased spinal stress with gait    Written Home Exercises Provided: yes. Exercises were reviewed and Alivia was able to demonstrate them prior to the end of the session.  Alivia demonstrated good  understanding of the education provided. See Electronic Medical Record under Patient Instructions for exercises provided during therapy sessions    Assessment     Alivia responded well to gait training with a single point cane and reported a reduction in back pain and discomfort. She has a challenging situation, as she has both back and knee pain that combing together to make the legs want to give out but she is very motivated to perform activity and improve her gait. Pt said she will think about getting a cane for temporary use    Alivia Is progressing well towards her goals.   Patient prognosis is Good.     Patient will continue to benefit from skilled outpatient physical therapy to address the deficits listed in the problem list box on initial evaluation, provide pt/family education and to maximize pt's  level of independence in the home and community environment.     Patient's spiritual, cultural and educational needs considered and pt agreeable to plan of care and goals.     Anticipated barriers to physical therapy: chronicity of condition, spinal instability    Goals: Short Term Goals: 4 weeks   1. Pt will report </= 5/10 pain at worst in brittany knee to improve tolerance to exercise.   2. Pt will improve MMT B LE musculature by 1/2 grade to improve function.   3. Pt will be independent with HEP to promote return to recreational activities.   4.  Patient will improve 5 time sit to stand to </=30 seconds in order to improve safety, transfers, and functional independence.     Long Term Goals: 8 weeks  1. Pt will report </= 3/10 pain in brittany knees to improve tolerance to exercise .  2. Pt will improve MMT B LE musculature by 1 muscle grade to improve functional mobility.  3. Pt will be independent with HEP progressions to promote return to recreational activities.  4.  Pt improve impaired knee ROM to WNL in all planes to improve mobility for normal movement patterns.   5. Patient will improve 5 time sit to stand to </=20 seconds in order to improve safety, transfers, and functional independence.  5. Patient will perform sit <> stand with min VC for proper technique from PT in order to encourage independence in transfers.    Plan     Continue with LE strengthening and core stabilization . Balance training may be needed as core muscle engagement increases    Maricruz Cohn, PT

## 2023-10-13 ENCOUNTER — TELEPHONE (OUTPATIENT)
Dept: PAIN MEDICINE | Facility: CLINIC | Age: 58
End: 2023-10-13
Payer: MEDICARE

## 2023-10-13 NOTE — TELEPHONE ENCOUNTER
Staff called patient, patient didn't answer. Staff left patient voicemail confirming appointment for October 16, 2023 at 8am.

## 2023-10-16 ENCOUNTER — OFFICE VISIT (OUTPATIENT)
Dept: PODIATRY | Facility: CLINIC | Age: 58
End: 2023-10-16
Payer: MEDICARE

## 2023-10-16 VITALS
HEART RATE: 65 BPM | DIASTOLIC BLOOD PRESSURE: 83 MMHG | BODY MASS INDEX: 41.65 KG/M2 | HEIGHT: 65 IN | SYSTOLIC BLOOD PRESSURE: 129 MMHG | WEIGHT: 250 LBS

## 2023-10-16 DIAGNOSIS — L84 CORNS AND CALLUS: Chronic | ICD-10-CM

## 2023-10-16 DIAGNOSIS — E11.49 TYPE II DIABETES MELLITUS WITH NEUROLOGICAL MANIFESTATIONS: Primary | Chronic | ICD-10-CM

## 2023-10-16 DIAGNOSIS — B35.1 DERMATOPHYTOSIS OF NAIL: ICD-10-CM

## 2023-10-16 PROCEDURE — 11057 PARNG/CUTG B9 HYPRKR LES >4: CPT | Mod: Q9,S$PBB,, | Performed by: PODIATRIST

## 2023-10-16 PROCEDURE — 11721 DEBRIDE NAIL 6 OR MORE: CPT | Performed by: PODIATRIST

## 2023-10-16 PROCEDURE — 99999 PR PBB SHADOW E&M-EST. PATIENT-LVL IV: ICD-10-PCS | Mod: PBBFAC,,, | Performed by: PODIATRIST

## 2023-10-16 PROCEDURE — 99499 UNLISTED E&M SERVICE: CPT | Mod: S$PBB,,, | Performed by: PODIATRIST

## 2023-10-16 PROCEDURE — 11721 ROUTINE FOOT CARE: ICD-10-PCS | Mod: Q9,59,S$PBB, | Performed by: PODIATRIST

## 2023-10-16 PROCEDURE — 99999 PR PBB SHADOW E&M-EST. PATIENT-LVL IV: CPT | Mod: PBBFAC,,, | Performed by: PODIATRIST

## 2023-10-16 PROCEDURE — 99214 OFFICE O/P EST MOD 30 MIN: CPT | Mod: PBBFAC,PN,25 | Performed by: PODIATRIST

## 2023-10-16 PROCEDURE — 99499 NO LOS: ICD-10-PCS | Mod: S$PBB,,, | Performed by: PODIATRIST

## 2023-10-16 PROCEDURE — 11057 ROUTINE FOOT CARE: ICD-10-PCS | Mod: Q9,S$PBB,, | Performed by: PODIATRIST

## 2023-10-16 NOTE — PATIENT INSTRUCTIONS
How to Check Your Feet    Below are tips to help you look for foot problems. Try to check your feet at the same time each day, such as when you get out of bed in the morning.    Check the top of each foot. The tops of toes, back of the heel, and outer edge of the foot can get a lot of rubbing from poor-fitting shoes.    Check the bottom of each foot. Daily wear and tear often leads to problems at pressure spots.    Check the toes and nails. Fungal infections often occur between toes. Toenail problems can also be a sign of fungal infections or lead to breaks in the skin.    Check your shoes, too. Loose objects inside a shoe can injure the foot. Use your hand to feel inside your shoes for things like alie, loose stitching, or rough areas that could irritate your skin.        Diabetic Foot Care    Diabetes can lead to a number of different foot complications. Fortunately, most of these complications can be prevented with a little extra foot care. If diabetes is not well controlled, the high blood sugar can cause damage to blood vessels and result in poor circulation to the foot. When the skin does not get enough blood flow, it becomes prone to pressure sores and ulcers, which heal slowly.  High blood sugar can also damage nerves, interfering with the ability to feel pain and pressure. When you cant feel your foot normally, it is easy to injure your skin, bones and joints without knowing it. For these reasons diabetes increases the risk of fungal infections, bunions and ulcers. Deep ulcers can lead to bone infection. Gangrene is the most serious foot complication of diabetes. It usually occurs on the tips of the toes as blacked areas of skin. The black area is dead tissue. In severe cases, gangrene spreads to involve the entire toe, other toes and the entire foot. Foot or toe amputation may be required. Good foot care and blood sugar control can prevent this.    Home Care  Wear comfortable, proper fitting  shoes.  Wash your feet daily with warm water and mild soap.  After drying, apply a moisturizing cream or lotion.  Check your feet daily for skin breaks, blisters, swelling, or redness. Look between your toes also.  Wear cotton socks and change them every day.  Trim toe nails carefully and do not cut your cuticles.  Strive to keep your blood sugar under control with a combination of medicines, diet and activity.  If you smoke and have diabetes, it is very important that you stop. Smoking reduces blood flow to your foot.  Avoid activities that increase your risk of foot injury:  Do not walk barefoot.  Do not use heating pads or hot water bottles on your feet.  Do not put your foot in a hot tub without first checking the temperature with your hand.  10) Schedule yearly foot exams.    Follow Up  with your doctor or as advised by our staff. Report any cut, puncture, scrape, other injury, blister, ingrown toenail or ulcer on your foot.    Get Prompt Medical Attention  if any of the following occur:  -- Open ulcer with pus draining from the wound  -- Increasing foot or leg pain  -- New areas of redness or swelling or tender areas of the foot    © 3818-8946 [employer]. [address] [place] [address], [place] [address]. All rights reserved. This information is not intended as a substitute for professional medical care. Always follow your healthcare professional's instructions.

## 2023-10-16 NOTE — PROGRESS NOTES
Chief Complaint   Patient presents with    Diabetic Foot Exam     Foot Exam/PCP Clarisse Richardson MD  09/18/23           HPI:   The patient is a 57 y.o.  female  who presents for a diabetic foot exam/care   Patient reports + presence of abnormal sensation to the feet, just sometimes, mostly at night.   Patient has no history of wounds on the feet.   Hx of foot surgery: none.     This patient has documented high risk feet requiring routine maintenance secondary to diabetes.          Primary care doctor is: Clarisse Richardson MD  Patient last saw primary care doctor on:  Diabetic Foot Exam (Foot Exam/PCP Clarisse Richardson MD  09/18/23)           Patient Active Problem List   Diagnosis    Morbid obesity    PADDY (obstructive sleep apnea)    Type 2 diabetes mellitus without complication, without long-term current use of insulin    Nuclear sclerosis - Both Eyes    Hyperlipemia    Proteinuria    Type 2 diabetes mellitus without retinopathy - Both Eyes    Bilateral knee pain    DJD (degenerative joint disease) of knee    Debility    Prosthetic joint implant failure    Failed total knee arthroplasty    Right knee pain    Knee pain    History of total left knee replacement    Lumbago    CTS (carpal tunnel syndrome)    Right carpal tunnel syndrome    Chronic, continuous use of opioids    Mild intermittent asthma without complication    Left arm pain    Left shoulder pain    Allergic reaction to food    DDD (degenerative disc disease), cervical    Cervical radiculopathy    Cervical spondylosis    Cubital tunnel syndrome on right    Bilateral shoulder bursitis    Cervical stenosis of spinal canal    DDD (degenerative disc disease), thoracic    Thoracic spondylosis    Spondylolisthesis at L4-L5 level    Lumbar spondylosis    Spondylolisthesis of cervical region    Cervical spine instability    Myeloradiculopathy    Neural foraminal stenosis of cervical spine    DDD (degenerative disc disease), lumbar    Idiopathic  scoliosis of thoracolumbar spine    Bilateral shoulder region arthritis    Impingement syndrome of right shoulder    Trochanteric bursitis of both hips    Chronic pain    Depression    Recurrent major depressive disorder, in partial remission    GERD (gastroesophageal reflux disease)    Trigger finger    Dyspnea    BMI 45.0-49.9, adult    Sacroiliac joint pain    Spondylosis of lumbosacral region without myelopathy or radiculopathy    Subacromial bursitis of left shoulder joint    Sacroiliitis    Snoring    BMI 39.0-39.9,adult    S/P panniculectomy    Gout    History of sleeve gastrectomy    History of total right knee replacement    Noncompliance with CPAP treatment    Osteoarthritis of lumbar spine    Aortic atherosclerosis    Uterine fibroid    Nuclear sclerotic cataract of left eye    Nuclear sclerotic cataract of right eye    Impaired range of motion of both knees    Decreased strength of lower extremity    Impaired functional mobility, balance, gait, and endurance           Current Outpatient Medications on File Prior to Visit   Medication Sig Dispense Refill    albuterol (VENTOLIN HFA) 90 mcg/actuation inhaler INHALE TWO PUFFS INTO LUNGS EVERY 4 TO 6 HOURS AS NEEDED FOR SHORTNESS OF BREATH AND FOR WHEEZING 18 g 1    allopurinoL (ZYLOPRIM) 300 MG tablet Take 1 tablet (300 mg total) by mouth 2 (two) times daily. 180 tablet 3    atorvastatin (LIPITOR) 20 MG tablet TAKE 1 TABLET (20 MG TOTAL) BY MOUTH ONCE DAILY. 90 tablet 2    b complex vitamins tablet Take 1 tablet by mouth every other day.       calcium citrate/vitamin D2 (ISIAH-CITRATE ORAL) Take 500 mg by mouth 2 (two) times daily.      colchicine, gout, (MITIGARE) 0.6 mg Cap TAKE 1 CAPSULE (0.6 MG TOTAL) BY MOUTH DAILY AS NEEDED (FLARE UP). 90 capsule 1    cyanocobalamin (VITAMIN B-12) 1000 MCG tablet Take 100 mcg by mouth every morning.      diclofenac sodium (VOLTAREN) 1 % Gel Apply 2 g topically 4 (four) times daily as needed. To painful area on the feet.  500 g 5    FLUoxetine (PROZAC) 20 mg/5 mL (4 mg/mL) solution TAKE 5 MLS BY MOUTH ONCE DAILY. 450 mL 3    fluticasone propionate (FLONASE) 50 mcg/actuation nasal spray 1 SPRAY BY EACH NOSTRIL ROUTE 2 TIMES DAILY AS NEEDED FOR RHINITIS. 48 g 3    indomethacin (INDOCIN) 50 MG capsule TAKE 1 CAPSULE BY MOUTH 2 TIMES DAILY WITH MEALS 30 capsule 2    LIDOcaine (XYLOCAINE) 5 % Oint ointment APPLY TOPICALLY AS NEEDED. 35.44 g 6    methylPREDNISolone (MEDROL, MICKI,) 4 mg tablet use as directed 1 each 0    MULTIVIT-IRON-MIN-FOLIC ACID 3,500-18-0.4 UNIT-MG-MG ORAL CHEW Take 1 tablet by mouth 2 (two) times daily.       nystatin (MYCOSTATIN) powder Apply topically 2 (two) times daily. 60 g 3    omeprazole (PRILOSEC) 20 MG capsule TAKE 1 CAPSULE (20 MG TOTAL) BY MOUTH EVERY MORNING. 90 capsule 2    oxybutynin (DITROPAN-XL) 5 MG TR24 TAKE 1 TABLET BY MOUTH ONCE DAILY. 90 tablet 3    oxyCODONE-acetaminophen (PERCOCET)  mg per tablet Take 1 tablet by mouth every 8 (eight) hours as needed for Pain. 90 tablet 0    tirzepatide (MOUNJARO) 2.5 mg/0.5 mL PnIj Inject 2.5 mg into the skin every 7 days. 4 pen 2    tretinoin microspheres (RETIN-A MICRO PUMP) 0.08 % GlwP Apply to affected area daily 50 g 0    urea (CARMOL) 40 % Crea Apply topically once daily. To dry skin on the feet. 120 g 5    vitamin D 185 MG Tab Take 5,000 mg by mouth every morning.       [] tiZANidine (ZANAFLEX) 4 MG tablet Take 1 tablet (4 mg total) by mouth every 8 (eight) hours as needed (muscle pain). 90 tablet 2     Current Facility-Administered Medications on File Prior to Visit   Medication Dose Route Frequency Provider Last Rate Last Admin    0.9%  NaCl infusion   Intravenous Continuous Lina Wagner MD 25 mL/hr at 12/15/20 1506 25 mL/hr at 12/15/20 1506    0.9%  NaCl infusion   Intravenous Continuous Ciro Chung MD           Review of patient's allergies indicates:  No Known Allergies                  ROS:  General ROS:  "negative  Respiratory ROS: no cough, shortness of breath, or wheezing  Cardiovascular ROS: no chest pain or dyspnea on exertion  Musculoskeletal ROS: negative  Neurological ROS:   negative for - gait disturbance, + numbness/tingling, seizures or tremors  Dermatological ROS: + nail changes, dry skin          LAST HbA1c:   Hemoglobin A1C   Date Value Ref Range Status   09/18/2023 6.3 (H) 4.0 - 5.6 % Final     Comment:     ADA Screening Guidelines:  5.7-6.4%  Consistent with prediabetes  >or=6.5%  Consistent with diabetes    High levels of fetal hemoglobin interfere with the HbA1C  assay. Heterozygous hemoglobin variants (HbS, HgC, etc)do  not significantly interfere with this assay.   However, presence of multiple variants may affect accuracy.     03/13/2023 6.3 (H) 4.0 - 5.6 % Final     Comment:     ADA Screening Guidelines:  5.7-6.4%  Consistent with prediabetes  >or=6.5%  Consistent with diabetes    High levels of fetal hemoglobin interfere with the HbA1C  assay. Heterozygous hemoglobin variants (HbS, HgC, etc)do  not significantly interfere with this assay.   However, presence of multiple variants may affect accuracy.     05/09/2022 5.9 (H) 4.0 - 5.6 % Final     Comment:     ADA Screening Guidelines:  5.7-6.4%  Consistent with prediabetes  >or=6.5%  Consistent with diabetes    High levels of fetal hemoglobin interfere with the HbA1C  assay. Heterozygous hemoglobin variants (HbS, HgC, etc)do  not significantly interfere with this assay.   However, presence of multiple variants may affect accuracy.           EXAM:   Vitals:    10/16/23 1138   BP: 129/83   Pulse: 65   Weight: 113.4 kg (250 lb)   Height: 5' 5" (1.651 m)       General: Pt. is well-developed, well-nourished, appears stated age, in no acute distress, alert and oriented x 3.      Vascular: Dorsalis pedis and posterior tibial pulses are 2/4 bilaterally. 3 secs capillary refill time and toes are warm to touch.    There is reduced hair growth on the feet.    " There is 1+ edema.  no varicosities.        Neurological:  Light touch, proprioception, and sharp/dull sensation are all intact bilaterally. Protective threshold with the [place] monofilament is diminished bilaterally.   +paresthesias      Dermatological:  The skin is thin    The toenails  1-5 L and 1-5 R  are greenish- yellow, long by 5-6mm and thickened by 2-3mm with subungual debris  .   There are no open wounds. There is no ecchymoses.  no erythema noted.   Skin diffusely dry on the soles     Interdigital spaces are intact, no fissures    Skin atrophic, thin, dry and mildly hyperpigmented.         Musculoskeletal:    Muscle strength is 5/5 in all groups bilaterally.    Metatarsophalangeal, subtalar, and ankle range of motion are intact without crepitus.     Ankle equinus bilateral         Assessment / Plan:      ICD-10-CM ICD-9-CM    1. Type II diabetes mellitus with neurological manifestations  E11.49 250.60       2. Corns and callus  L84 700       3. Dermatophytosis of nail  B35.1 110.1               I counseled the patient on the patient's conditions, their implications and medical management.  Shoe inspection.     Continue good nutrition and blood sugar control to help prevent podiatric complications of diabetes.   Maintain proper foot hygiene.   Continue wearing proper shoe gear, daily foot inspections, never walking without protective shoe gear, never putting sharp instruments to feet.  Shoe and activity modification as needed for relief.       Routine Foot Care    Date/Time: 10/16/2023 11:30 AM    Performed by: Stacey Rowland DPM  Authorized by: Stacey Rowland DPM    Consent Done?:  Yes (Verbal)  Hyperkeratotic Skin Lesions?: Yes    Number of trimmed lesions:  5  Location(s):  Left Plantar, Right Plantar, Right Midfoot, Left 1st Toe and Right 1st Toe    Nail Care Type:  Debride  Location(s): All  (Left 1st Toe, Left 3rd Toe, Left 2nd Toe, Left 4th Toe, Left 5th Toe, Right 1st Toe, Right 2nd Toe,  Right 3rd Toe, Right 4th Toe and Right 5th Toe)  Patient tolerance:  Patient tolerated the procedure well with no immediate complications     With patient's permission, the toenails mentioned above were reduced and debrided using a nail nipper, removing offending nail and debris.   Utilizing a #15 scalpel, I trimmed the corns and calluses at the above mentioned location.      The patient will continue to monitor the areas daily, inspect the feet, wear protective shoe gear when ambulatory, and moisturizer to maintain skin integrity.

## 2023-10-23 ENCOUNTER — HOSPITAL ENCOUNTER (OUTPATIENT)
Dept: RADIOLOGY | Facility: HOSPITAL | Age: 58
Discharge: HOME | End: 2023-10-23
Attending: NEUROLOGICAL SURGERY
Payer: MEDICARE

## 2023-10-23 ENCOUNTER — OFFICE VISIT (OUTPATIENT)
Dept: ORTHOPEDICS | Facility: CLINIC | Age: 58
End: 2023-10-23
Payer: MEDICARE

## 2023-10-23 ENCOUNTER — OFFICE VISIT (OUTPATIENT)
Dept: PAIN MEDICINE | Facility: CLINIC | Age: 58
End: 2023-10-23
Payer: MEDICARE

## 2023-10-23 ENCOUNTER — OFFICE VISIT (OUTPATIENT)
Dept: NEUROSURGERY | Facility: CLINIC | Age: 58
End: 2023-10-23
Payer: MEDICARE

## 2023-10-23 VITALS — BODY MASS INDEX: 42.13 KG/M2 | HEIGHT: 65 IN | WEIGHT: 252.88 LBS

## 2023-10-23 VITALS
HEIGHT: 65 IN | SYSTOLIC BLOOD PRESSURE: 110 MMHG | TEMPERATURE: 97 F | HEART RATE: 81 BPM | WEIGHT: 251.31 LBS | BODY MASS INDEX: 41.87 KG/M2 | DIASTOLIC BLOOD PRESSURE: 70 MMHG

## 2023-10-23 VITALS
DIASTOLIC BLOOD PRESSURE: 79 MMHG | SYSTOLIC BLOOD PRESSURE: 124 MMHG | HEIGHT: 65 IN | OXYGEN SATURATION: 100 % | WEIGHT: 251.31 LBS | RESPIRATION RATE: 18 BRPM | HEART RATE: 88 BPM | TEMPERATURE: 98 F | BODY MASS INDEX: 41.87 KG/M2

## 2023-10-23 DIAGNOSIS — G89.4 CHRONIC PAIN SYNDROME: ICD-10-CM

## 2023-10-23 DIAGNOSIS — M54.16 LUMBAR RADICULOPATHY: ICD-10-CM

## 2023-10-23 DIAGNOSIS — Z96.651 HISTORY OF REVISION OF TOTAL REPLACEMENT OF RIGHT KNEE JOINT: ICD-10-CM

## 2023-10-23 DIAGNOSIS — G89.29 CHRONIC PAIN OF BOTH KNEES: Primary | ICD-10-CM

## 2023-10-23 DIAGNOSIS — E66.01 MORBID OBESITY WITH BMI OF 40.0-44.9, ADULT: ICD-10-CM

## 2023-10-23 DIAGNOSIS — M47.817 SPONDYLOSIS OF LUMBOSACRAL REGION WITHOUT MYELOPATHY OR RADICULOPATHY: ICD-10-CM

## 2023-10-23 DIAGNOSIS — M47.816 LUMBAR SPONDYLOSIS: Primary | ICD-10-CM

## 2023-10-23 DIAGNOSIS — M25.562 CHRONIC PAIN OF BOTH KNEES: Primary | ICD-10-CM

## 2023-10-23 DIAGNOSIS — M25.561 CHRONIC PAIN OF BOTH KNEES: Primary | ICD-10-CM

## 2023-10-23 DIAGNOSIS — M51.36 DDD (DEGENERATIVE DISC DISEASE), LUMBAR: ICD-10-CM

## 2023-10-23 DIAGNOSIS — M54.9 DORSALGIA, UNSPECIFIED: ICD-10-CM

## 2023-10-23 DIAGNOSIS — M48.061 SPINAL STENOSIS OF LUMBAR REGION, UNSPECIFIED WHETHER NEUROGENIC CLAUDICATION PRESENT: Primary | ICD-10-CM

## 2023-10-23 DIAGNOSIS — Z96.652 STATUS POST TOTAL LEFT KNEE REPLACEMENT: ICD-10-CM

## 2023-10-23 DIAGNOSIS — M47.816 LUMBAR SPONDYLOSIS: ICD-10-CM

## 2023-10-23 PROCEDURE — 72114 XR LUMBAR SPINE 5 VIEW WITH FLEX AND EXT: ICD-10-PCS | Mod: 26,,, | Performed by: RADIOLOGY

## 2023-10-23 PROCEDURE — 99214 OFFICE O/P EST MOD 30 MIN: CPT | Mod: S$PBB,,, | Performed by: ORTHOPAEDIC SURGERY

## 2023-10-23 PROCEDURE — 72131 CT LUMBAR SPINE W/O DYE: CPT | Mod: 26,,, | Performed by: RADIOLOGY

## 2023-10-23 PROCEDURE — 99214 OFFICE O/P EST MOD 30 MIN: CPT | Mod: S$PBB,,, | Performed by: NEUROLOGICAL SURGERY

## 2023-10-23 PROCEDURE — 72131 CT LUMBAR SPINE W/O DYE: CPT | Mod: TC

## 2023-10-23 PROCEDURE — 99214 OFFICE O/P EST MOD 30 MIN: CPT | Mod: S$PBB,,, | Performed by: ANESTHESIOLOGY

## 2023-10-23 PROCEDURE — 99212 OFFICE O/P EST SF 10 MIN: CPT | Mod: 25,PBBFAC | Performed by: ORTHOPAEDIC SURGERY

## 2023-10-23 PROCEDURE — 99999 PR PBB SHADOW E&M-EST. PATIENT-LVL IV: CPT | Mod: PBBFAC,,, | Performed by: ANESTHESIOLOGY

## 2023-10-23 PROCEDURE — 72131 CT LUMBAR SPINE WITHOUT CONTRAST: ICD-10-PCS | Mod: 26,,, | Performed by: RADIOLOGY

## 2023-10-23 PROCEDURE — 99214 PR OFFICE/OUTPT VISIT, EST, LEVL IV, 30-39 MIN: ICD-10-PCS | Mod: S$PBB,,, | Performed by: ANESTHESIOLOGY

## 2023-10-23 PROCEDURE — 99999 PR PBB SHADOW E&M-EST. PATIENT-LVL II: ICD-10-PCS | Mod: PBBFAC,,, | Performed by: ORTHOPAEDIC SURGERY

## 2023-10-23 PROCEDURE — 99214 OFFICE O/P EST MOD 30 MIN: CPT | Mod: PBBFAC,25,27 | Performed by: ANESTHESIOLOGY

## 2023-10-23 PROCEDURE — 99214 PR OFFICE/OUTPT VISIT, EST, LEVL IV, 30-39 MIN: ICD-10-PCS | Mod: S$PBB,,, | Performed by: ORTHOPAEDIC SURGERY

## 2023-10-23 PROCEDURE — 99214 OFFICE O/P EST MOD 30 MIN: CPT | Mod: PBBFAC,25,27 | Performed by: NEUROLOGICAL SURGERY

## 2023-10-23 PROCEDURE — 99214 PR OFFICE/OUTPT VISIT, EST, LEVL IV, 30-39 MIN: ICD-10-PCS | Mod: S$PBB,,, | Performed by: NEUROLOGICAL SURGERY

## 2023-10-23 PROCEDURE — 99999 PR PBB SHADOW E&M-EST. PATIENT-LVL IV: CPT | Mod: PBBFAC,,, | Performed by: NEUROLOGICAL SURGERY

## 2023-10-23 PROCEDURE — 72114 X-RAY EXAM L-S SPINE BENDING: CPT | Mod: TC

## 2023-10-23 PROCEDURE — 72114 X-RAY EXAM L-S SPINE BENDING: CPT | Mod: 26,,, | Performed by: RADIOLOGY

## 2023-10-23 PROCEDURE — 99999 PR PBB SHADOW E&M-EST. PATIENT-LVL IV: ICD-10-PCS | Mod: PBBFAC,,, | Performed by: ANESTHESIOLOGY

## 2023-10-23 PROCEDURE — 99999 PR PBB SHADOW E&M-EST. PATIENT-LVL II: CPT | Mod: PBBFAC,,, | Performed by: ORTHOPAEDIC SURGERY

## 2023-10-23 PROCEDURE — 99999 PR PBB SHADOW E&M-EST. PATIENT-LVL IV: ICD-10-PCS | Mod: PBBFAC,,, | Performed by: NEUROLOGICAL SURGERY

## 2023-10-23 NOTE — PROGRESS NOTES
"Subjective:      Patient ID: Alivia Thomas is a 57 y.o. female.    Chief Complaint: Pain of the Right Knee and Pain of the Left Knee    HPI  Alivia Thomas has bilateral knee pain.  Both knees.  She does note some more pain with weather change but other than that there is no significant change from when I saw her last.  The knees occasionally give out but she has not.  Her history, medications and problem list were reviewed.    Review of Systems   Constitutional: Negative for chills, fever and night sweats.   HENT:  Negative for hearing loss.    Eyes:  Negative for blurred vision and double vision.   Cardiovascular:  Negative for chest pain, claudication and leg swelling.   Respiratory:  Negative for shortness of breath.    Endocrine: Negative for polydipsia, polyphagia and polyuria.   Hematologic/Lymphatic: Negative for adenopathy and bleeding problem. Does not bruise/bleed easily.   Skin:  Negative for poor wound healing.   Gastrointestinal:  Negative for diarrhea and heartburn.   Genitourinary:  Negative for bladder incontinence.   Neurological:  Negative for focal weakness, headaches, numbness, paresthesias and sensory change.   Psychiatric/Behavioral:  The patient is not nervous/anxious.    Allergic/Immunologic: Negative for persistent infections.         Objective:      Body mass index is 42.08 kg/m².  Vitals:    10/23/23 0937   Weight: 114.7 kg (252 lb 13.9 oz)   Height: 5' 5" (1.651 m)           General    Constitutional: She is oriented to person, place, and time. She appears well-developed and well-nourished.   HENT:   Head: Normocephalic and atraumatic.   Eyes: EOM are normal.   Cardiovascular:  Normal rate.            Pulmonary/Chest: Effort normal.   Neurological: She is alert and oriented to person, place, and time.   Psychiatric: She has a normal mood and affect. Her behavior is normal.     General Musculoskeletal Exam   Gait: abnormal       Right Knee Exam     Inspection   Erythema: " absent  Scars: present  Swelling: present  Effusion: absent  Deformity: absent  Bruising: absent    Tenderness   The patient is tender to palpation of the medial retinaculum and lateral retinaculum.    Range of Motion   Extension:  15   Flexion:  100     Tests   Ligament Examination   Lachman: normal (-1 to 2mm)   MCL - Valgus: normal (0 to 2mm)  LCL - Varus: normal  Patella   Passive Patellar Tilt: neutral    Other   Sensation: normal    Left Knee Exam     Inspection   Erythema: absent  Scars: present  Swelling: absent  Effusion: absent  Deformity: absent  Bruising: absent    Tenderness   The patient is experiencing no tenderness and patella.   The patient tender to palpation of the no tenderness and patella.    Crepitus   The patient has crepitus of the patella.    Range of Motion   Extension:  0   Flexion:  120     Tests   Stability   Lachman: normal (-1 to 2mm)   MCL - Valgus: normal (0 to 2mm)  LCL - Varus: normal (0 to 2mm)  Patella   Passive Patellar Tilt: neutral    Other   Sensation: normal    Muscle Strength   Right Lower Extremity   Hip Abduction: 5/5   Quadriceps:  5/5   Hamstrin/5   Left Lower Extremity   Hip Abduction: 5/5   Quadriceps:  5/5   Hamstrin/5     Vascular Exam       Edema  Right Lower Leg: absent  Left Lower Leg: absent      MRIs of both knees were obtained and reviewed by me.  The left side shows some fragmentation of the patella some cysts.  The right side shows some significant synovitis with an effusion.        Assessment:       Encounter Diagnoses   Name Primary?    Chronic pain of both knees Yes    History of revision of total replacement of right knee joint     Status post total left knee replacement     Morbid obesity with BMI of 40.0-44.9, adult           Plan:       Alivia was seen today for pain and pain.    Diagnoses and all orders for this visit:    Chronic pain of both knees    History of revision of total replacement of right knee joint    Status post total left knee  replacement    Morbid obesity with BMI of 40.0-44.9, adult      Options were discussed.  We will observe this at present.  She will return in 6 months with new radiographs both knees.  Sooner if necessary.

## 2023-10-23 NOTE — PROGRESS NOTES
Chronic patient Established Note (Follow up visit)       SUBJECTIVE:  Interval History 10/23/23: Alivia Thomas returns for follow up. This is a patient of Dr. Hurtado. Her visit with [place] was cancelled last week so she was placed on my schedule today. At her last visit she was having pain radiating down her legs, but did not want to have an epidural. They performed a Toradol shot (short term relief) and referred her to Neurosurgery. She saw Dr. Bartlett today who said either she will need SCS or decompression. On review of her CT and MRI, she has severe facet arthropathy and severe canal stenosis at L3/4 and L4/5. She notes being able to walk <1 block, after which she must sit down. She reports 8-10/10 pain and this is interfering with her life significantly.       Interval History 9/18/2023:  The patient presents today to request an injection for increased back pain. She has been having worsened pain over the past couple of weeks. She does not wish to have another KERI at this time. She is having lower back pain with radiation down the back of both legs, R>L. She has associated numbness and tingling as well as subjective weakness. No bowel or bladder incontinence. She has not seen neurosurgery. Her pain today is 10/10.    Interval History 8/14/2023:  The patient is here for follow up after procedure for back pain. She is now s/p L5/S1 IL KERI with about 50% relief. She still has lower back pain with radiation down the back of the legs, stopping at the knees. She has associated numbness to lower extremities. Her pain worsens with walking, bending and reaching upward. She has been working on home exercises and stretches without much benefit. She has some benefit with medication as needed.  She report Her pain today is 9/10.    Interval History 7/10/2023:  The patient is here today for evaluation of increased lower back pain. She has a long-standing history of back pain which is mainly axial. She has had worsened  symptoms over the past couple of weeks, most severe over the past 3 days. The pain now radiates down the back of both legs with burning and numbness. She finds it difficult to stand or walk for any length of time. No bowel or bladder incontinence. No fever or malaise. Medications are helping somewhat. She continues with home PT exercises as previously taught in PT. Her pain today is 10/10.    Interval History 4/10/2023:  The patient returns to clinic today for follow up of low back and knee pain via virtual visit. She is s/p right L3,4,5 RFA on 3/6/2023. Her left side procedure was rescheduled due to illness. This is scheduled for May 1st. She reports some relief of her right sided low back pain. She continues to report left sided low back pain. She denies any radicular leg pain. She does endorse morning stiffness. She continues to perform a home exercise routine. She is taking Percocet as needed for severe pain. She denies any other health changes.     Interval History 1/30/2023:  The patient returns to clinic today for follow up of low back and knee pain. She is s/p right genicular RFA on 12/12/2022 and left genicular RFA on 1/9/2023. She reports 60% relief of her knee pain. She reports intermittent bilateral knee pain, this is tolerable at this time. She reports increased low back pain. Her pain is constant and aching in nature. Her leg pain is much improved. Her pain is worse with moving from sitting to standing. She does endorse morning stiffness. She continues to participate in physical therapy and a home exercise routine. She continues to take Percocet as needed with benefit and without adverse effects. She denies any other health changes.     Interval History 11/22/2022:  The patient returns to clinic today for follow up of low back and knee pain. She continues to report low back pain that radiates into the posterior aspect of both legs to under her feet. Her pain is worse with moving from sitting to  standing. She also endorses morning stiffness. She recently started physical therapy. She has completed one session. She continues to report bilateral knee pain. She endorses worsening pain with the cold weather. Her pain is worse with standing and walking. She continues to take Percocet as needed with benefit and without adverse effects. She denies any other health changes. Her pain today is 8/10.    Interval History 10/5/2022:  She returns for follow-up.  Her knees are doing well.  She has some discomfort but not enough to do procedures.  Her main complaint is lumbar spine pain for the past few weeks that is radiating to the dorsal aspect of both lower extremities.  She has no red flag signs/symptoms.  No new bowel or bladder symptomatology.  The pain radiates from the back down to the plantar aspect of both feet.  It is activity related.  Rest helps some but it does not resolve the pain.  She has not been in therapy for a while.  No recent imaging.  No recent weight changes.  Otherwise, no new symptomatology.  She goes regularly to her primary care physician for health maintenance.    Interval History 8/9/2022:  Alivia Thomas presents to the clinic for a follow-up appointment for low back and knee pain. She is s/p right genicular RFA on 5/9/2022 and left genicular RFA on 5/22/2022. She reports 60% relief of her knee pain. She also had panniculectomy on 6/14/2022. She is healing well. She continues to report intermittent low back pain, worse with prolonged activity. She denies any radicular leg pain. Her pain is worse prolonged standing and walking. She continues to perform a homoe exercise routine. She continues to take Percocet as needed with benefit and without adverse effects. She denies any other health changes. Her pain today is 7/10.    Interval History 4/9/2022:  The patient returns to clinic today for follow-up bilateral L3, 4, 5 RFA last month.  She reports that she is feeling very well following the  procedure and reports 60-70% pain relief.  Today, she reports that her bilateral knee pain has continued to worsen, and she would like to go ahead and repeat bilateral genicular RFA as soon as possible.  She denies any new numbness, tingling, weakness, saddle anesthesia or bowel/bladder incontinence.    Interval History 12/28/2021:  The patient returns to clinic today for follow up via virtual visit. She continues to report bilateral knee pain. This pain is worse with prolonged standing and walking. She continues to report low back and buttock pain. She denies any radicular leg pain. She continues to report a home exercise routine. She continues to report benefit with Percocet. She denies any adverse effects. She denies any other health changes.    Pain Disability Index Review:      10/23/2023     3:37 PM 9/18/2023     1:25 PM 8/14/2023     8:43 AM   Last 3 PDI Scores   Pain Disability Index (PDI) 27 24 43       Pain Medications:  Percocet 10/325 mg TID PRN pain    Opioid Contract: yes     report:  Reviewed and consistent with medication use as prescribed.    Pain Procedures:   steroid inj and visco-supplementation (series of 3) in left knee- not helpful  3/31/14 Bilateral genicular nerve blocks  2/16/16 Bilateral L2,3,4,5 MBB  3/1/16 Bilateral L2,3,4,5 MBB   4/6/16 Left L2,3,4,5 RFA- significant relief  4/20/16 Right L2,3,4,5 RFA- significant relief  10/5/16 Left L2,3,4,5 cooled RFA  10/19/16 Right L2,3,4,5 cooled RFA  4/26/17 Left L2,3,4,5 cooled RFA  5/9/17 Right L2,3,4,5 cooled RFA  12/26/2017- Left L2,3,4,5 cooled RFA  1/9/2018- Right L2,3,4,5 cooled RFA  6/26/18 Left L2,3,4,5 cooled RFA- 60% relief  7/10/18 Right L2,3,4,5 cooled RFA- 60% relief  9/28/18 TPIs- significant relief  12/27/18 Left L2,3,4,5 RFA- 70% relief  1/29/19 Right L2,3,4,5 RFA- 70% relief  6/18/19 Left L2,3,4,5 RFA- 70% relief  7/9/19 Right L2,3,4,5 RFA- 50% relief  12/19/19 Left L2,3,4,5 RFA- 80% relief  12/31/19 Right L2,3,4,5 RFA- 50%  relief  3/2/2020- Right genicular nerve block- 70% relief for one month  3/12/20 Left subacromial bursa injection- 90% relief  5/18/2020- Left genicular nerve block- 70%  6/9/2020- Bilateral SI joint injections- 50% relief  10/13/2020- Right genicular RFA- 50% relief   11/3/2020- Left genicular RFA- 50% relief  12/15/2020- Left L2,3,4,5 RFA  12/29/2020- Right L2,3,4,5 RFA  4/26/2021- Left subacromial bursa'  5/11/2021- Bilateral SI joint injections   6/28/2021- Left genicular RFA  7/13/2021- Right genicular RFA  8/24/2021- Right L2,3,4,5 RFA  9/11/2021- Left L2,3,4,5 RFA  3/7/2022- Right L2,3,4,5 RFA  3/21/2022- Left L2,3,4,5 RFA  5/9/2022- Right genicular RFA  5/23/2022- Left genicular RFA  12/12/2022- Right genicular RFA  1/9/2023- Left genicular RFA  3/6/2023- Right L3,4,5 RFA  7/24/23 L5/S1 IL KERI- 50% relief    Physical Therapy/Home Exercise: yes    Imaging:   Xray Knee 3/6/2019:  FINDINGS:  Postop bilateral knee replacements.  The the the the irregularity about the left patella with soft tissue calcifications similar.  Small joint effusion.  Some irregularity of the right patella unchanged as well.     IMPRESSION:      Postop bilateral knee replacement with irregularity about the patella as above.    MRI Cervical Spine 4/24/2018:  FINDINGS:  There is reversal of the normal cervical lordosis which could be related to patient positioning versus muscle spasm.     Cervical vertebral body heights are well maintained without evidence of acute fracture or dislocation.  Marrow signal is unremarkable.     Spinal canal contents are unremarkable.  No abnormal masses or collections.     Individual levels as detailed below:     C2-3: No significant spinal canal stenosis or neuroforaminal narrowing.     C3-4: Facet arthropathy.  No significant spinal canal stenosis or neuroforaminal narrowing.     C4-5: Facet arthropathy.  Small broad-based disc osteophyte complex.  Mild right neuroforaminal narrowing.  Mild spinal canal  stenosis.     C5-6: Facet arthropathy.  Uncovertebral joint spurring.  Broad-based posterior disc osteophyte complex.  Mild right neuroforaminal narrowing.  Mild spinal canal stenosis.     C6-7: Facet arthropathy.  No significant spinal canal stenosis or neuroforaminal narrowing.     C7-T1: No significant spinal canal stenosis or neuroforaminal narrowing.     Paraspinal muscles are unremarkable.  Soft tissues are intact.     IMPRESSION:      Mild multilevel cervical spondylosis with mild spinal canal stenosis and mild right neuroforaminal narrowing at C4-5 and C5-6.    Xray Lumbar Spine 9/21/2015:  Results: AP, lateral neutral, lateral flexion , lateral extension, bilateral oblique and spot views.  The alignment of the lumbar spine demonstrates a mild levoscoliosis .  11-mm anterior listhesis of L4 relative to L5 with no translational abnormalities   seen on flexion and extension views.  The vertebral body heights  are well-maintained  , mild disk space narrowing L4-L5 and L5-S1.   Mild anterior and marginal osteophyte formation seen  throughout the lumbar spine  .  The oblique views demonstrate no   definite spondylolysis.  There is facet joint osseous hypertrophy noted at L3-L4 and L4-L5.  IMPRESSION:         The Significant spondylosis of the lumbar spine with grade 1 anterior listhesis L4 relative to L5.  Facet joint osseous hypertrophy L3-L4 and L4-5.    Allergies:   Review of patient's allergies indicates:   Allergen Reactions    Strawberry Anaphylaxis       Current Medications:   Current Outpatient Medications   Medication Sig Dispense Refill    albuterol (VENTOLIN HFA) 90 mcg/actuation inhaler INHALE TWO PUFFS INTO LUNGS EVERY 4 TO 6 HOURS AS NEEDED FOR SHORTNESS OF BREATH AND FOR WHEEZING 18 g 1    allopurinoL (ZYLOPRIM) 300 MG tablet Take 1 tablet (300 mg total) by mouth 2 (two) times daily. 180 tablet 3    atorvastatin (LIPITOR) 20 MG tablet TAKE 1 TABLET (20 MG TOTAL) BY MOUTH ONCE DAILY. 90 tablet 2     b complex vitamins tablet Take 1 tablet by mouth every other day.       calcium citrate/vitamin D2 (ISIAH-CITRATE ORAL) Take 500 mg by mouth 2 (two) times daily.      colchicine, gout, (MITIGARE) 0.6 mg Cap TAKE 1 CAPSULE (0.6 MG TOTAL) BY MOUTH DAILY AS NEEDED (FLARE UP). 90 capsule 1    cyanocobalamin (VITAMIN B-12) 1000 MCG tablet Take 100 mcg by mouth every morning.      diclofenac sodium (VOLTAREN) 1 % Gel Apply 2 g topically 4 (four) times daily as needed. To painful area on the feet. 500 g 5    FLUoxetine (PROZAC) 20 mg/5 mL (4 mg/mL) solution TAKE 5 MLS BY MOUTH ONCE DAILY. 450 mL 3    fluticasone propionate (FLONASE) 50 mcg/actuation nasal spray 1 SPRAY BY EACH NOSTRIL ROUTE 2 TIMES DAILY AS NEEDED FOR RHINITIS. 48 g 3    indomethacin (INDOCIN) 50 MG capsule TAKE 1 CAPSULE BY MOUTH 2 TIMES DAILY WITH MEALS 30 capsule 2    LIDOcaine (XYLOCAINE) 5 % Oint ointment APPLY TOPICALLY AS NEEDED. 35.44 g 6    MULTIVIT-IRON-MIN-FOLIC ACID 3,500-18-0.4 UNIT-MG-MG ORAL CHEW Take 1 tablet by mouth 2 (two) times daily.       nystatin (MYCOSTATIN) powder Apply topically 2 (two) times daily. 60 g 3    omeprazole (PRILOSEC) 20 MG capsule TAKE 1 CAPSULE (20 MG TOTAL) BY MOUTH EVERY MORNING. 90 capsule 2    oxybutynin (DITROPAN-XL) 5 MG TR24 TAKE 1 TABLET BY MOUTH ONCE DAILY. 90 tablet 3    oxyCODONE-acetaminophen (PERCOCET)  mg per tablet Take 1 tablet by mouth every 8 (eight) hours as needed for Pain. 90 tablet 0    tirzepatide (MOUNJARO) 2.5 mg/0.5 mL PnIj Inject 2.5 mg into the skin every 7 days. 4 pen 2    tretinoin microspheres (RETIN-A MICRO PUMP) 0.08 % GlwP Apply to affected area daily 50 g 0    urea (CARMOL) 40 % Crea Apply topically once daily. To dry skin on the feet. 120 g 5    vitamin D 185 MG Tab Take 5,000 mg by mouth every morning.       methylPREDNISolone (MEDROL, MICKI,) 4 mg tablet use as directed 1 each 0     No current facility-administered medications for this visit.     Facility-Administered  Medications Ordered in Other Visits   Medication Dose Route Frequency Provider Last Rate Last Admin    0.9%  NaCl infusion   Intravenous Continuous Lina Wagner MD 25 mL/hr at 12/15/20 1506 25 mL/hr at 12/15/20 1506    0.9%  NaCl infusion   Intravenous Continuous Ciro Chung MD           REVIEW OF SYSTEMS:    GENERAL:  No weight loss, malaise or fevers.  HEENT:  Negative for frequent or significant headaches.  NECK:  Negative for lumps, goiter, pain and significant neck swelling.  RESPIRATORY:  Negative for cough, wheezing or shortness of breath.  CARDIOVASCULAR:  Negative for chest pain, leg swelling or palpitations. HTN  GI:  Negative for abdominal discomfort, blood in stools or black stools or change in bowel habits. GERD  MUSCULOSKELETAL:  See HPI.  SKIN:  Negative for lesions, rash, and itching.  PSYCH:  Negative for sleep disturbance, mood disorder and recent psychosocial stressors.  HEMATOLOGY/LYMPHOLOGY:  Negative for prolonged bleeding, bruising easily or swollen nodes.  NEURO:   No history of headaches, syncope, paralysis, seizures or tremors.  ENDO: Diabetes  All other reviewed and negative other than HPI.    Past Medical History:  Past Medical History:   Diagnosis Date    Allergy     Anemia     Asthma     Bilateral shoulder bursitis     Cervical stenosis of spine     Chronic pain     DDD (degenerative disc disease), cervical     Depression 01/28/2019    Diabetes mellitus type II     DJD (degenerative joint disease) of knee 06/19/2014    Facet arthritis of lumbar region 12/17/2015    Facet syndrome 12/17/2015    GERD (gastroesophageal reflux disease)     Heartburn     Hyperlipidemia     Hypertension     Morbid obesity     Neuromuscular disorder     Nuclear sclerotic cataract of left eye 8/8/2023    PADDY (obstructive sleep apnea)     Proteinuria     Right carpal tunnel syndrome     Sacroiliitis 06/13/2018    Sacroiliitis     Sleep apnea     Uterine fibroid        Past Surgical History:  Past  Surgical History:   Procedure Laterality Date    CARPAL TUNNEL RELEASE      CARPAL TUNNEL RELEASE  1980s    left    CARPAL TUNNEL RELEASE  2012    right    CATARACT EXTRACTION W/  INTRAOCULAR LENS IMPLANT Left 8/8/2023    Procedure: EXTRACTION, CATARACT, WITH IOL INSERTION;  Surgeon: Justin Block MD;  Location: [facility];  Service: Ophthalmology;  Laterality: Left;    CATARACT EXTRACTION W/  INTRAOCULAR LENS IMPLANT Right 8/29/2023    Procedure: EXTRACTION, CATARACT, WITH IOL INSERTION;  Surgeon: Justin Block MD;  Location: [facility] OR;  Service: Ophthalmology;  Laterality: Right;    COLONOSCOPY N/A 6/15/2020    Procedure: COLONOSCOPY;  Surgeon: Jc Koo MD;  Location: [facility] (4TH FLR);  Service: Endoscopy;  Laterality: N/A;  COVID screening on 6/13/20 at [facility]  pt updated on drop off location and no visitor policy-    EPIDURAL STEROID INJECTION N/A 7/24/2023    Procedure: INJECTION, STEROID, EPIDURAL, L5/S1 SOONER DATE;  Surgeon: Israel Hurtado MD;  Location: [facility] PAIN MGT;  Service: Pain Management;  Laterality: N/A;    ESOPHAGOGASTRODUODENOSCOPY N/A 3/27/2019    Procedure: EGD (ESOPHAGOGASTRODUODENOSCOPY);  Surgeon: Robbin Bar MD;  Location: [facility] (2ND FLR);  Service: Endoscopy;  Laterality: N/A;  BMI 61.3/2nd floor case/svn    INJECTION OF JOINT Bilateral 6/9/2020    Procedure: INJECTION, JOINT, BILATERAL SI;  Surgeon: Lina Wagner MD;  Location: [facility] PAIN MGT;  Service: Pain Management;  Laterality: Bilateral;  B/L SI Joint Injection    INJECTION OF JOINT Bilateral 5/11/2021    Procedure: INJECTION, JOINT, SACROILIAC (SI) need consent;  Surgeon: Lina Wagner MD;  Location: [facility] PAIN MGT;  Service: Pain Management;  Laterality: Bilateral;    JOINT REPLACEMENT Bilateral     with 2 revisions on rt    KNEE SURGERY  3/2010    orthroscope    KNEE SURGERY  6-19-14    left TKR    LAPAROSCOPIC SLEEVE GASTRECTOMY N/A 8/28/2019    Procedure: GASTRECTOMY, SLEEVE,  LAPAROSCOPIC with intraop EGD;  Surgeon: Heriberto Clements MD;  Location: [facility] OR 2ND FLR;  Service: General;  Laterality: N/A;    PANNICULECTOMY N/A 6/14/2022    Procedure: PANNICULECTOMY;  Surgeon: Rhett Gray MD;  Location: [facility] OR 2ND FLR;  Service: Plastics;  Laterality: N/A;    RADIOFREQUENCY ABLATION Right 7/9/2019    Procedure: RADIOFREQUENCY ABLATION;  Surgeon: Lina Wagner MD;  Location: [facility] PAIN MGT;  Service: Pain Management;  Laterality: Right;  RIGHT RFA L2,3,4,5  2 of 2    RADIOFREQUENCY ABLATION Left 12/19/2019    Procedure: RADIOFREQUENCY ABLATION, LEFT L2-L3-L4-L5 MEDIAL BRANCH 1 OF 2;  Surgeon: Lina Wagner MD;  Location: [facility] PAIN MGT;  Service: Pain Management;  Laterality: Left;    RADIOFREQUENCY ABLATION Right 12/31/2019    Procedure: RADIOFREQUENCY ABLATION, RIGHT L2-L3-L4-L5  2 OF 2;  Surgeon: Lina Wagner MD;  Location: [facility] PAIN MGT;  Service: Pain Management;  Laterality: Right;    RADIOFREQUENCY ABLATION Right 10/13/2020    Procedure: RADIOFREQUENCY ABLATION, Genicular Cooled 1 of 2;  Surgeon: Lina Wagner MD;  Location: [facility] PAIN MGT;  Service: Pain Management;  Laterality: Right;    RADIOFREQUENCY ABLATION Left 11/3/2020    Procedure: RADIOFREQUENCY ABLATION, GENICULAR COOLED  2 OF 2;  Surgeon: Lina Wagner MD;  Location: [facility] PAIN MGT;  Service: Pain Management;  Laterality: Left;    RADIOFREQUENCY ABLATION Left 12/15/2020    Procedure: RADIOFREQUENCY ABLATION LEFT L2,3,4,5 1 of 2;  Surgeon: Lina Wagner MD;  Location: [facility] PAIN MGT;  Service: Pain Management;  Laterality: Left;    RADIOFREQUENCY ABLATION Right 12/29/2020    Procedure: RADIOFREQUENCY ABLATION RIGHT L2,3,4,5 2 of 2;  Surgeon: Lina Wagner MD;  Location: [facility] PAIN MGT;  Service: Pain Management;  Laterality: Right;    RADIOFREQUENCY ABLATION Left 6/29/2021    Procedure: RADIOFREQUENCY ABLATION GENICULAR COOLED;  Surgeon: Lina Wagner MD;  Location: [facility]  PAIN MGT;  Service: Pain Management;  Laterality: Left;  1 of 2    RADIOFREQUENCY ABLATION Right 7/13/2021    Procedure: RADIOFREQUENCY ABLATION GENICULAR;  Surgeon: Lina Wagner MD;  Location: [facility] PAIN MGT;  Service: Pain Management;  Laterality: Right;  2 of 2    RADIOFREQUENCY ABLATION Right 8/24/2021    Procedure: RADIOFREQUENCY ABLATION, L2-L3-L4-L5 MEDIAL BRANCH 1 OF 2;  Surgeon: Lina Wagner MD;  Location: [facility] PAIN MGT;  Service: Pain Management;  Laterality: Right;    RADIOFREQUENCY ABLATION Left 9/11/2021    Procedure: RADIOFREQUENCY ABLATION, L2-L3-L4-L5 MEDIAL BR ANCH 2 OF 2;  Surgeon: Lina Wagner MD;  Location: [facility] PAIN MGT;  Service: Pain Management;  Laterality: Left;    RADIOFREQUENCY ABLATION Right 3/7/2022    Procedure: RADIOFREQUENCY ABLATION RIGHT L3,4,5 1 of 2, needs consent;  Surgeon: Israel Hurtado MD;  Location: [facility] PAIN MGT;  Service: Pain Management;  Laterality: Right;    RADIOFREQUENCY ABLATION Left 3/21/2022    Procedure: RADIOFREQUENCY ABLATION RIGHT L3,4,5 2 of 2, needs consent;  Surgeon: Israel Hurtado MD;  Location: [facility] PAIN MGT;  Service: Pain Management;  Laterality: Left;    RADIOFREQUENCY ABLATION Right 5/9/2022    Procedure: RADIOFREQUENCY ABLATION RIGHT GENICULAR COOLED 1 of 2;  Surgeon: Israel Hurtado MD;  Location: [facility] PAIN MGT;  Service: Pain Management;  Laterality: Right;    RADIOFREQUENCY ABLATION Left 5/23/2022    Procedure: RADIOFREQUENCY ABLATION LEFT GENICULAR COOLED  2 of 2;  Surgeon: Israel Hurtado MD;  Location: [facility] PAIN MGT;  Service: Pain Management;  Laterality: Left;    RADIOFREQUENCY ABLATION Right 12/12/2022    Procedure: RADIOFREQUENCY ABLATION RIGHT GENICULAR COOLED  ONE OF TWO  NEEDS CONSENT;  Surgeon: Israel Hurtado MD;  Location: [facility] PAIN MGT;  Service: Pain Management;  Laterality: Right;    RADIOFREQUENCY ABLATION Left 1/9/2023    Procedure: RADIOFREQUENCY ABLATION LEFT GENICULAR COOLED  TWO OF TWO NEEDS CONSENT;  Surgeon: Israel Hurtado  MD;  Location: [facility] PAIN MGT;  Service: Pain Management;  Laterality: Left;    RADIOFREQUENCY ABLATION Right 3/6/2023    Procedure: RADIOFREQUENCY ABLATION RIGHT L3,L4,L5;  Surgeon: Israel Hurtado MD;  Location: [facility] PAIN MGT;  Service: Pain Management;  Laterality: Right;    RADIOFREQUENCY ABLATION Left 5/22/2023    Procedure: RADIOFREQUENCY ABLATION LEFT L3,L4,L5;  Surgeon: Israel Hurtado MD;  Location: [facility] PAIN MGT;  Service: Pain Management;  Laterality: Left;  4/3 LM TO R/S    RADIOFREQUENCY ABLATION OF LUMBAR MEDIAL BRANCH NERVE AT SINGLE LEVEL Left 6/26/2018    Procedure: RADIOFREQUENCY ABLATION, NERVE, MEDIAL BRANCH, LUMBAR, 1 LEVEL;  Surgeon: Lina Wagner MD;  Location: [facility] PAIN MGT;  Service: Pain Management;  Laterality: Left;  Left Cooled RFA @ L2,3,4,5  [id number]  with Sedation    1 of 2    RADIOFREQUENCY ABLATION OF LUMBAR MEDIAL BRANCH NERVE AT SINGLE LEVEL Right 7/10/2018    Procedure: RADIOFREQUENCY ABLATION, NERVE, MEDIAL BRANCH, LUMBAR, 1 LEVEL;  Surgeon: Lina Wagner MD;  Location: [facility] PAIN MGT;  Service: Pain Management;  Laterality: Right;  RIght Cooled RFA @ L2,3,4,5  [id number] with Sedation    2 of 2    TRIGGER FINGER RELEASE Right 2017    TRIGGER FINGER RELEASE Left 7/29/2019    Procedure: RELEASE, TRIGGER FINGER, Left Thumb;  Surgeon: Velma Garcia MD;  Location: [facility] OR;  Service: Orthopedics;  Laterality: Left;  Local w/ MAC       Family History:  Family History   Problem Relation Age of Onset    Diabetes Mother     Cataracts Mother     Diabetes Father     Cataracts Father     Hypertension Sister     Diabetes Sister     No Known Problems Sister     Cancer Sister         lymphoma     Coronary artery disease Brother     Diabetes Brother     Diabetes Brother     Hypotension Brother     Kidney failure Brother     Hypotension Brother     Amblyopia Neg Hx     Blindness Neg Hx     Glaucoma Neg Hx     Macular degeneration Neg Hx     Retinal detachment Neg Hx     Strabismus  Neg Hx     Stroke Neg Hx     Thyroid disease Neg Hx     Anesthesia problems Neg Hx        Social History:  Social History     Socioeconomic History    Marital status:    Tobacco Use    Smoking status: Never    Smokeless tobacco: Never   Substance and Sexual Activity    Alcohol use: Not Currently     Comment: occasionally     Drug use: No    Sexual activity: Yes     Partners: Female     Birth control/protection: None   Social History Narrative    Disabled. The patient is the youngest of 6 siblings. Single. Lives with single-sex partner.                  Social Determinants of Health     Financial Resource Strain: Unknown (6/12/2023)    Overall Financial Resource Strain (CARDIA)     Difficulty of Paying Living Expenses: Patient refused   Food Insecurity: No Food Insecurity (6/12/2023)    Hunger Vital Sign     Worried About Running Out of Food in the Last Year: Never true     Ran Out of Food in the Last Year: Never true   Transportation Needs: No Transportation Needs (6/12/2023)    PRAPARE - Transportation     Lack of Transportation (Medical): No     Lack of Transportation (Non-Medical): No   Physical Activity: Unknown (6/12/2023)    Exercise Vital Sign     Days of Exercise per Week: Patient refused     Minutes of Exercise per Session: Patient refused   Stress: Stress Concern Present (6/12/2023)    [employer] [place] [employer] - Occupational Stress Questionnaire     Feeling of Stress : Very much   Social Connections: Unknown (6/12/2023)    Social Connection and Isolation Panel [NHANES]     Frequency of Communication with Friends and Family: Twice a week     Frequency of Social Gatherings with Friends and Family: Never     Attends [employer] Services: More than 4 times per year     Active Member of Clubs or Organizations: No     Attends Club or Organization Meetings: Never     Marital Status: Patient refused   Housing Stability: Unknown (6/12/2023)    Housing Stability Vital Sign     Unable to Pay for  "Housing in the Last Year: Patient refused     Unstable Housing in the Last Year: Patient refused       OBJECTIVE:    VITALS:    /79   Pulse 88   Temp 98 °F (36.7 °C)   Resp 18   Ht 5' 5" (1.651 m)   Wt 114 kg (251 lb 5.2 oz)   LMP 2018   SpO2 100%   BMI 41.82 kg/m²     PREVIOUS PHYSICAL EXAMINATION:    GENERAL: Well appearing, in no acute distress, alert and oriented x3.   PSYCH:  Mood and affect appropriate. Tearful and guarding on exam.   SKIN: Skin color, texture, turgor normal, no rashes or lesions.   HEAD/FACE:  Normocephalic, atraumatic.   CV: RRR with palpation of the radial artery.  PULM: No evidence of respiratory difficulty, symmetric chest rise.  BACK: Negative SLR bilaterally. SLR is positive on the right. There is pain with palpation over midline lumbar spine. Limited ROM with pain on flexion and extension.  Positive facet loading bilaterally. Guarding over the right GTB.   EXTREMITIES: No edema  MUSCULOSKELETAL: 4/5 strength in right ankle with plantar and dorsiflexion. 4/5 strength in left ankle with plantar and dorsiflexion. 5/5 strength with right knee flexion and extension. 5/5 strength with left knee flexion and extension. 5/5 strength in right EHL, 5/5 strength in left EHL.  NEURO:  Plantar response are downgoing. No clonus. Normal sensation.   GAIT: Antalgic- ambulates without assistance.      ASSESSMENT: 57 y.o. year old female with low back and knee pain, consistent with the followin. Spinal stenosis of lumbar region, unspecified whether neurogenic claudication present              PLAN:     - Previous imaging reviewed today.    - Reviewed NS recommendations: SCS vs Decompression/fusion     - We reviewed her options. She is not ready to make a decision.     - She is requesting increased pain medication. She is already on  Percocet 10/325 mg TID PRN.    - I will arrange an appt with Dr. Hurtado so she can address this with him    - Toradol 30mg IM requested and " performed today.       The above plan and management options were discussed at length with patient. Patient is in agreement with the above and verbalized understanding.    Melissa DURANJuan A Shraddha  10/23/2023    Patient Name: Alivia Thomas  MRN: [id number]    INFORMED CONSENT: The procedure, risks, benefits and options were discussed with patient. There are no contraindications to the procedure. The patient expressed understanding and agreed to proceed. The personnel performing the procedure was discussed. I verify that I personally obtained Alivia Thomas's consent prior to the start of the procedure and the signed consent can be found on the patient's chart.    Procedure Date: 10/23/2023    Anesthesia: None    Pre Procedure diagnosis: M79.1 Myalgia    Post-Procedure diagnosis: same    Sedation: None    PROCEDURE: Toradol IM INJECTION  The patient was standing and time out was perfomed. The patient's right buttock was prepped with alcohol three times.  The muscle was grasped between the thumb and forefinger and a 27-gauge 1.5 inch  needle was advanced through the skin and subcutaneous tissues and into the muscle. Aspiration for blood, air and CSF was negative.  A total of 30mg Toradol was injected. The needle was removed intact  and bleeding was nil. No complications were evident.     Melissa Llanes MD  10/23/2023

## 2023-10-23 NOTE — PROGRESS NOTES
Neurosurgery  Established Patient    SUBJECTIVE:     History of Present Illness:  Ms. Thomas is a 57-year-old female who was referred to me by Virginia Sanchez NP.  Her past medical history is significant for allergy, anemia, asthma, bilateral shoulder bursitis, depression, diabetes, GERD, hyperlipidemia, hypertension, morbid obesity, cataracts, obstructive sleep apnea, carpal tunnel syndrome, and uterine fibroids.  She complains of a 1-1/2 month history of low back pain.  This pain radiates down the posterior aspect of her legs to her knees.  She also complains of bilateral knee pain.  She denies any pain that radiates past her knees.  Her back pain is equal to her leg pain.  Lying down, sitting, standing, walking, and bending forward makes her pain worse.  Essentially, staying in any 1 position for an extended period of time exacerbates her pain.  Nothing really seems to make her pain better although steroids do help somewhat.  She is had physical therapy and epidural steroid injections which helped somewhat.  Most recently, she underwent an L5-S1 interlaminar KERI which brought her about 50% relief of her pain for a short period of time.  She denies any lower extremity weakness.  She denies any bowel or bladder incontinence.    Interval History 10/23/2023:  Ms. Thomas is a 57-year-old female who is seeing me today in follow-up.  Her last neurosurgery clinic appointment was on October 2, 2023.  At that time, she complains of low back pain with bilateral lower extremity radiating pain down the posterior aspect of her legs to her knees.  An MRI of the lumbar spine showed L3-4 and L4-5 anterolisthesis with severe central canal stenosis and neural foraminal narrowing.  I had ordered a CT scan of the lumbar spine as well as flexion-extension x-rays of the lumbar spine and she is here today to see me in follow-up.  He is to complain of low back pain with radiation into bilateral lower extremities.  This goes down the  posterior aspect of her legs to her knees.  Her back pain is equal to her leg pain.  Her left leg pain is equal to her right leg pain.  She also complains of bilateral knee pain for which she was seeing Dr. Swan of Orthopedics.  She denies any lower extremity paresthesias.  She denies any weakness.  She denies bowel or bladder incontinence.    Review of patient's allergies indicates:   Allergen Reactions    Strawberry Anaphylaxis       Current Outpatient Medications   Medication Sig Dispense Refill    albuterol (VENTOLIN HFA) 90 mcg/actuation inhaler INHALE TWO PUFFS INTO LUNGS EVERY 4 TO 6 HOURS AS NEEDED FOR SHORTNESS OF BREATH AND FOR WHEEZING 18 g 1    allopurinoL (ZYLOPRIM) 300 MG tablet Take 1 tablet (300 mg total) by mouth 2 (two) times daily. 180 tablet 3    atorvastatin (LIPITOR) 20 MG tablet TAKE 1 TABLET (20 MG TOTAL) BY MOUTH ONCE DAILY. 90 tablet 2    b complex vitamins tablet Take 1 tablet by mouth every other day.       calcium citrate/vitamin D2 (ISIAH-CITRATE ORAL) Take 500 mg by mouth 2 (two) times daily.      colchicine, gout, (MITIGARE) 0.6 mg Cap TAKE 1 CAPSULE (0.6 MG TOTAL) BY MOUTH DAILY AS NEEDED (FLARE UP). 90 capsule 1    cyanocobalamin (VITAMIN B-12) 1000 MCG tablet Take 100 mcg by mouth every morning.      diclofenac sodium (VOLTAREN) 1 % Gel Apply 2 g topically 4 (four) times daily as needed. To painful area on the feet. 500 g 5    FLUoxetine (PROZAC) 20 mg/5 mL (4 mg/mL) solution TAKE 5 MLS BY MOUTH ONCE DAILY. 450 mL 3    fluticasone propionate (FLONASE) 50 mcg/actuation nasal spray 1 SPRAY BY EACH NOSTRIL ROUTE 2 TIMES DAILY AS NEEDED FOR RHINITIS. 48 g 3    indomethacin (INDOCIN) 50 MG capsule TAKE 1 CAPSULE BY MOUTH 2 TIMES DAILY WITH MEALS 30 capsule 2    LIDOcaine (XYLOCAINE) 5 % Oint ointment APPLY TOPICALLY AS NEEDED. 35.44 g 6    MULTIVIT-IRON-MIN-FOLIC ACID 3,500-18-0.4 UNIT-MG-MG ORAL CHEW Take 1 tablet by mouth 2 (two) times daily.       nystatin (MYCOSTATIN) powder Apply  topically 2 (two) times daily. 60 g 3    omeprazole (PRILOSEC) 20 MG capsule TAKE 1 CAPSULE (20 MG TOTAL) BY MOUTH EVERY MORNING. 90 capsule 2    oxybutynin (DITROPAN-XL) 5 MG TR24 TAKE 1 TABLET BY MOUTH ONCE DAILY. 90 tablet 3    oxyCODONE-acetaminophen (PERCOCET)  mg per tablet Take 1 tablet by mouth every 8 (eight) hours as needed for Pain. 90 tablet 0    tirzepatide (MOUNJARO) 2.5 mg/0.5 mL PnIj Inject 2.5 mg into the skin every 7 days. 4 pen 2    tretinoin microspheres (RETIN-A MICRO PUMP) 0.08 % GlwP Apply to affected area daily 50 g 0    urea (CARMOL) 40 % Crea Apply topically once daily. To dry skin on the feet. 120 g 5    vitamin D 185 MG Tab Take 5,000 mg by mouth every morning.       methylPREDNISolone (MEDROL, MICKI,) 4 mg tablet use as directed 1 each 0     No current facility-administered medications for this visit.     Facility-Administered Medications Ordered in Other Visits   Medication Dose Route Frequency Provider Last Rate Last Admin    0.9%  NaCl infusion   Intravenous Continuous Lina Wagner MD 25 mL/hr at 12/15/20 1506 25 mL/hr at 12/15/20 1506    0.9%  NaCl infusion   Intravenous Continuous Ciro Chung MD           Past Medical History:   Diagnosis Date    Allergy     Anemia     Asthma     Bilateral shoulder bursitis     Cervical stenosis of spine     Chronic pain     DDD (degenerative disc disease), cervical     Depression 01/28/2019    Diabetes mellitus type II     DJD (degenerative joint disease) of knee 06/19/2014    Facet arthritis of lumbar region 12/17/2015    Facet syndrome 12/17/2015    GERD (gastroesophageal reflux disease)     Heartburn     Hyperlipidemia     Hypertension     Morbid obesity     Neuromuscular disorder     Nuclear sclerotic cataract of left eye 8/8/2023    PADDY (obstructive sleep apnea)     Proteinuria     Right carpal tunnel syndrome     Sacroiliitis 06/13/2018    Sacroiliitis     Sleep apnea     Uterine fibroid      Past Surgical History:   Procedure  Laterality Date    CARPAL TUNNEL RELEASE      CARPAL TUNNEL RELEASE  1980s    left    CARPAL TUNNEL RELEASE  2012    right    CATARACT EXTRACTION W/  INTRAOCULAR LENS IMPLANT Left 8/8/2023    Procedure: EXTRACTION, CATARACT, WITH IOL INSERTION;  Surgeon: Justin Block MD;  Location: [facility] OR;  Service: Ophthalmology;  Laterality: Left;    CATARACT EXTRACTION W/  INTRAOCULAR LENS IMPLANT Right 8/29/2023    Procedure: EXTRACTION, CATARACT, WITH IOL INSERTION;  Surgeon: Justin Block MD;  Location: [facility] OR;  Service: Ophthalmology;  Laterality: Right;    COLONOSCOPY N/A 6/15/2020    Procedure: COLONOSCOPY;  Surgeon: Jc Koo MD;  Location: [facility] (4TH FLR);  Service: Endoscopy;  Laterality: N/A;  COVID screening on 6/13/20 at [facility]  pt updated on drop off location and no visitor [facility]-    EPIDURAL STEROID INJECTION N/A 7/24/2023    Procedure: INJECTION, STEROID, EPIDURAL, L5/S1 SOONER DATE;  Surgeon: Israel Hurtado MD;  Location: [facility] PAIN MGT;  Service: Pain Management;  Laterality: N/A;    ESOPHAGOGASTRODUODENOSCOPY N/A 3/27/2019    Procedure: EGD (ESOPHAGOGASTRODUODENOSCOPY);  Surgeon: Robbin Bar MD;  Location: [facility] (2ND FLR);  Service: Endoscopy;  Laterality: N/A;  BMI 61.3/2nd floor case/svn    INJECTION OF JOINT Bilateral 6/9/2020    Procedure: INJECTION, JOINT, BILATERAL SI;  Surgeon: Lina Wagner MD;  Location: [facility] PAIN MGT;  Service: Pain Management;  Laterality: Bilateral;  B/L SI Joint Injection    INJECTION OF JOINT Bilateral 5/11/2021    Procedure: INJECTION, JOINT, SACROILIAC (SI) need consent;  Surgeon: Lina Wagner MD;  Location: [facility] PAIN MGT;  Service: Pain Management;  Laterality: Bilateral;    JOINT REPLACEMENT Bilateral     with 2 revisions on rt    KNEE SURGERY  3/2010    orthroscope    KNEE SURGERY  6-19-14    left TKR    LAPAROSCOPIC SLEEVE GASTRECTOMY N/A 8/28/2019    Procedure: GASTRECTOMY, SLEEVE, LAPAROSCOPIC with intraop EGD;   Surgeon: Heriberto Clements MD;  Location: [facility] OR 2ND FLR;  Service: General;  Laterality: N/A;    PANNICULECTOMY N/A 6/14/2022    Procedure: PANNICULECTOMY;  Surgeon: Rhett Gray MD;  Location: [facility] OR 2ND FLR;  Service: Plastics;  Laterality: N/A;    RADIOFREQUENCY ABLATION Right 7/9/2019    Procedure: RADIOFREQUENCY ABLATION;  Surgeon: Lina Wagner MD;  Location: [facility] PAIN MGT;  Service: Pain Management;  Laterality: Right;  RIGHT RFA L2,3,4,5  2 of 2    RADIOFREQUENCY ABLATION Left 12/19/2019    Procedure: RADIOFREQUENCY ABLATION, LEFT L2-L3-L4-L5 MEDIAL BRANCH 1 OF 2;  Surgeon: Lina Wagner MD;  Location: [facility] PAIN MGT;  Service: Pain Management;  Laterality: Left;    RADIOFREQUENCY ABLATION Right 12/31/2019    Procedure: RADIOFREQUENCY ABLATION, RIGHT L2-L3-L4-L5  2 OF 2;  Surgeon: Lina Wagner MD;  Location: [facility] PAIN MGT;  Service: Pain Management;  Laterality: Right;    RADIOFREQUENCY ABLATION Right 10/13/2020    Procedure: RADIOFREQUENCY ABLATION, Genicular Cooled 1 of 2;  Surgeon: Lina Wagner MD;  Location: [facility] PAIN MGT;  Service: Pain Management;  Laterality: Right;    RADIOFREQUENCY ABLATION Left 11/3/2020    Procedure: RADIOFREQUENCY ABLATION, GENICULAR COOLED  2 OF 2;  Surgeon: Lina Wagner MD;  Location: [facility] PAIN MGT;  Service: Pain Management;  Laterality: Left;    RADIOFREQUENCY ABLATION Left 12/15/2020    Procedure: RADIOFREQUENCY ABLATION LEFT L2,3,4,5 1 of 2;  Surgeon: Lina Wagner MD;  Location: [facility] PAIN MGT;  Service: Pain Management;  Laterality: Left;    RADIOFREQUENCY ABLATION Right 12/29/2020    Procedure: RADIOFREQUENCY ABLATION RIGHT L2,3,4,5 2 of 2;  Surgeon: Lina Wagner MD;  Location: [facility] PAIN MGT;  Service: Pain Management;  Laterality: Right;    RADIOFREQUENCY ABLATION Left 6/29/2021    Procedure: RADIOFREQUENCY ABLATION GENICULAR COOLED;  Surgeon: Lina Wagner MD;  Location: [facility] PAIN MGT;  Service: Pain  Management;  Laterality: Left;  1 of 2    RADIOFREQUENCY ABLATION Right 7/13/2021    Procedure: RADIOFREQUENCY ABLATION GENICULAR;  Surgeon: Lina Wagner MD;  Location: [facility] PAIN MGT;  Service: Pain Management;  Laterality: Right;  2 of 2    RADIOFREQUENCY ABLATION Right 8/24/2021    Procedure: RADIOFREQUENCY ABLATION, L2-L3-L4-L5 MEDIAL BRANCH 1 OF 2;  Surgeon: Lina Wagner MD;  Location: [facility] PAIN MGT;  Service: Pain Management;  Laterality: Right;    RADIOFREQUENCY ABLATION Left 9/11/2021    Procedure: RADIOFREQUENCY ABLATION, L2-L3-L4-L5 MEDIAL BR ANCH 2 OF 2;  Surgeon: Lina Wagner MD;  Location: [facility] PAIN MGT;  Service: Pain Management;  Laterality: Left;    RADIOFREQUENCY ABLATION Right 3/7/2022    Procedure: RADIOFREQUENCY ABLATION RIGHT L3,4,5 1 of 2, needs consent;  Surgeon: Israel Hurtado MD;  Location: [facility] PAIN [facility]GT;  Service: Pain Management;  Laterality: Right;    RADIOFREQUENCY ABLATION Left 3/21/2022    Procedure: RADIOFREQUENCY ABLATION RIGHT L3,4,5 2 of 2, needs consent;  Surgeon: Israel Hurtado MD;  Location: [facility] PAIN MGT;  Service: Pain Management;  Laterality: Left;    RADIOFREQUENCY ABLATION Right 5/9/2022    Procedure: RADIOFREQUENCY ABLATION RIGHT GENICULAR COOLED 1 of 2;  Surgeon: Israel Hurtado MD;  Location: [facility] PAIN MGT;  Service: Pain Management;  Laterality: Right;    RADIOFREQUENCY ABLATION Left 5/23/2022    Procedure: RADIOFREQUENCY ABLATION LEFT GENICULAR COOLED  2 of 2;  Surgeon: Israel Hurtado MD;  Location: [facility] PAIN MGT;  Service: Pain Management;  Laterality: Left;    RADIOFREQUENCY ABLATION Right 12/12/2022    Procedure: RADIOFREQUENCY ABLATION RIGHT GENICULAR COOLED  ONE OF TWO  NEEDS CONSENT;  Surgeon: Israel Hurtado MD;  Location: [facility] PAIN MGT;  Service: Pain Management;  Laterality: Right;    RADIOFREQUENCY ABLATION Left 1/9/2023    Procedure: RADIOFREQUENCY ABLATION LEFT GENICULAR COOLED  TWO OF TWO NEEDS CONSENT;  Surgeon: Israel Hurtado MD;  Location: [facility] PAIN  MGT;  Service: Pain Management;  Laterality: Left;    RADIOFREQUENCY ABLATION Right 3/6/2023    Procedure: RADIOFREQUENCY ABLATION RIGHT L3,L4,L5;  Surgeon: Israel Hurtado MD;  Location: [facility] PAIN MGT;  Service: Pain Management;  Laterality: Right;    RADIOFREQUENCY ABLATION Left 5/22/2023    Procedure: RADIOFREQUENCY ABLATION LEFT L3,L4,L5;  Surgeon: Israel Hurtado MD;  Location: [facility] PAIN MGT;  Service: Pain Management;  Laterality: Left;  4/3 LM TO R/S    RADIOFREQUENCY ABLATION OF LUMBAR MEDIAL BRANCH NERVE AT SINGLE LEVEL Left 6/26/2018    Procedure: RADIOFREQUENCY ABLATION, NERVE, MEDIAL BRANCH, LUMBAR, 1 LEVEL;  Surgeon: Lina Wagner MD;  Location: [facility] PAIN MGT;  Service: Pain Management;  Laterality: Left;  Left Cooled RFA @ L2,3,4,5  [id number]  with Sedation    1 of 2    RADIOFREQUENCY ABLATION OF LUMBAR MEDIAL BRANCH NERVE AT SINGLE LEVEL Right 7/10/2018    Procedure: RADIOFREQUENCY ABLATION, NERVE, MEDIAL BRANCH, LUMBAR, 1 LEVEL;  Surgeon: Lina Wagner MD;  Location: [facility] PAIN MGT;  Service: Pain Management;  Laterality: Right;  RIght Cooled RFA @ L2,3,4,5  [id number] with Sedation    2 of 2    TRIGGER FINGER RELEASE Right 2017    TRIGGER FINGER RELEASE Left 7/29/2019    Procedure: RELEASE, TRIGGER FINGER, Left Thumb;  Surgeon: Velma Garcia MD;  Location: [facility] OR;  Service: Orthopedics;  Laterality: Left;  Local w/ MAC     Family History       Problem Relation (Age of Onset)    Cancer Sister    Cataracts Mother, Father    Coronary artery disease Brother    Diabetes Mother, Father, Sister, Brother, Brother    Hypertension Sister    Hypotension Brother, Brother    Kidney failure Brother    No Known Problems Sister          Social History     Socioeconomic History    Marital status:    Tobacco Use    Smoking status: Never    Smokeless tobacco: Never   Substance and Sexual Activity    Alcohol use: Not Currently     Comment: occasionally     Drug use: No    Sexual activity: Yes      "Partners: Female     Birth control/protection: None   Social History Narrative    Disabled. The patient is the youngest of 6 siblings. Single. Lives with single-sex partner.                  Social Determinants of Health     Financial Resource Strain: Unknown (6/12/2023)    Overall Financial Resource Strain (CARDIA)     Difficulty of Paying Living Expenses: Patient refused   Food Insecurity: No Food Insecurity (6/12/2023)    Hunger Vital Sign     Worried About Running Out of Food in the Last Year: Never true     Ran Out of Food in the Last Year: Never true   Transportation Needs: No Transportation Needs (6/12/2023)    PRAPARE - Transportation     Lack of Transportation (Medical): No     Lack of Transportation (Non-Medical): No   Physical Activity: Unknown (6/12/2023)    Exercise Vital Sign     Days of Exercise per Week: Patient refused     Minutes of Exercise per Session: Patient refused   Stress: Stress Concern Present (6/12/2023)    [employer] - Occupational Stress Questionnaire     Feeling of Stress : Very much   Social Connections: Unknown (6/12/2023)    Social Connection and Isolation Panel [NHANES]     Frequency of Communication with Friends and Family: Twice a week     Frequency of Social Gatherings with Friends and Family: Never     Attends [employer] Services: More than 4 times per year     Active Member of Clubs or Organizations: No     Attends Club or Organization Meetings: Never     Marital Status: Patient refused   Housing Stability: Unknown (6/12/2023)    Housing Stability Vital Sign     Unable to Pay for Housing in the Last Year: Patient refused     Unstable Housing in the Last Year: Patient refused       Review of Systems    OBJECTIVE:     Vital Signs  Temp: 97.4 °F (36.3 °C)  Pulse: 81  BP: 110/70  Pain Score:   9  Height: 5' 5" (165.1 cm)  Weight: 114 kg (251 lb 5.2 oz) (PER PT)  Body mass index is 41.82 kg/m².    Physical Exam:  Vitals reviewed.    Constitutional: She " appears well-developed and well-nourished. No distress.     Eyes: Pupils are equal, round, and reactive to light. Conjunctivae and EOM are normal.     Cardiovascular: Normal rate, regular rhythm, normal pulses and no edema.     Abdominal: Soft. Bowel sounds are normal.     Skin: Skin displays no rash on trunk and no rash on extremities. Skin displays no lesions on trunk and no lesions on extremities.     Psych/Behavior: She is alert. She is oriented to person, place, and time. She has a normal mood and affect.     Musculoskeletal: Gait is normal.        Neck: Range of motion is full. There is no tenderness. Muscle strength is 5/5. Tone is normal.        Back: Range of motion is full. There is no tenderness. Muscle strength is 5/5. Tone is normal.        Right Upper Extremities: Range of motion is full. There is no tenderness. Muscle strength is 5/5. Tone is normal.        Left Upper Extremities: Range of motion is full. There is no tenderness. Muscle strength is 5/5. Tone is normal.       Right Lower Extremities: Range of motion is full. There is no tenderness. Muscle strength is 5/5. Tone is normal.        Left Lower Extremities: Range of motion is full. There is no tenderness. Muscle strength is 5/5. Tone is normal.     Neurological:        Coordination: She has a normal Romberg Test, normal finger to nose coordination and normal tandem walking coordination.        Sensory: There is no sensory deficit in the trunk. There is no sensory deficit in the extremities.        DTRs: DTRs are DTRS NORMAL AND SYMMETRICnormal and symmetric. She displays no Babinski's sign on the right side. She displays no Babinski's sign on the left side.        Cranial nerves: Cranial nerve(s) II, III, IV, V, VI, VII, VIII, IX, X, XI and XII are intact.         Diagnostic Results:  She is a CT scan of the lumbar spine available for review which I personally reviewed.  This shows facet arthropathy at L3-4 and L4-5.  There are no pars  fractures.    She has flexion-extension x-rays of the lumbar spine available for review which I personally reviewed.  There is no abnormal movement in flexion or extension views.    ASSESSMENT/PLAN:     Ms. Thomas is a 57-year-old female with a history of low back pain and bilateral lower extremity pain as described above.  Her imaging studies show L3-4 and L4-5 spondylolisthesis with severe central stenosis and bilateral neural foraminal narrowing at the aforementioned levels.  While there is no movement on flexion-extension x-rays, given her component of persistent back pain, I do not feel that decompression alone would help the patient's pain.  Since she is failed conservative therapy, I have recommended an L3-4 and L4-5 TLIF.  Explained the procedure in detail to the patient as well as the risks and benefits and pros and cons.  The patient is unsure if she wishes to proceed with any surgical intervention at this point.  Conversely, if she does not wish to undergo a lumbar instrumentation and fusion, I believe that spinal cord stimulation as an option for the patient.  In any event, the patient will call me back if she decides to proceed with surgery.  Otherwise, I will see her on an as-needed basis.  She knows she can call with any further questions or concerns in the meantime.        Note dictated with voice recognition software, please excuse any grammatical errors.       Phil Bustillos

## 2023-10-24 ENCOUNTER — OFFICE VISIT (OUTPATIENT)
Dept: PAIN MEDICINE | Facility: CLINIC | Age: 58
End: 2023-10-24
Attending: ANESTHESIOLOGY
Payer: MEDICARE

## 2023-10-24 VITALS
BODY MASS INDEX: 41.87 KG/M2 | SYSTOLIC BLOOD PRESSURE: 143 MMHG | HEART RATE: 57 BPM | HEIGHT: 65 IN | TEMPERATURE: 97 F | WEIGHT: 251.31 LBS | RESPIRATION RATE: 19 BRPM | DIASTOLIC BLOOD PRESSURE: 93 MMHG

## 2023-10-24 DIAGNOSIS — M17.0 PRIMARY OSTEOARTHRITIS OF BOTH KNEES: ICD-10-CM

## 2023-10-24 DIAGNOSIS — M48.061 SPINAL STENOSIS OF LUMBAR REGION, UNSPECIFIED WHETHER NEUROGENIC CLAUDICATION PRESENT: ICD-10-CM

## 2023-10-24 DIAGNOSIS — M47.816 SPONDYLOSIS OF LUMBAR REGION WITHOUT MYELOPATHY OR RADICULOPATHY: Primary | ICD-10-CM

## 2023-10-24 PROCEDURE — 99215 OFFICE O/P EST HI 40 MIN: CPT | Mod: PBBFAC | Performed by: ANESTHESIOLOGY

## 2023-10-24 PROCEDURE — 99999 PR PBB SHADOW E&M-EST. PATIENT-LVL V: CPT | Mod: PBBFAC,,, | Performed by: ANESTHESIOLOGY

## 2023-10-24 PROCEDURE — 99999 PR PBB SHADOW E&M-EST. PATIENT-LVL V: ICD-10-PCS | Mod: PBBFAC,,, | Performed by: ANESTHESIOLOGY

## 2023-10-24 PROCEDURE — 99214 PR OFFICE/OUTPT VISIT, EST, LEVL IV, 30-39 MIN: ICD-10-PCS | Mod: S$PBB,,, | Performed by: ANESTHESIOLOGY

## 2023-10-24 PROCEDURE — 99214 OFFICE O/P EST MOD 30 MIN: CPT | Mod: S$PBB,,, | Performed by: ANESTHESIOLOGY

## 2023-10-24 NOTE — PROGRESS NOTES
Chronic patient Established Note (Follow up visit)       SUBJECTIVE:  Interval History 10/24/23:  Alivia Thomas returns today for follow-up. Since last seen, they report their pain has been worsening. She last received a toradol shot instead of an epidural as she did not wish to have an epidural.  Today, she is here for follow-up with me to evaluate.  She is requesting to increase her oxycodone to 4 times a day instead of 3 times a day.  I had a long discussion with her and advised her we either change the medication but not increase it, try repeating radiofrequency ablation since it worked well for her and/or spinal cord stimulator.  Yesterday, surgery was another alternative for managing her pain.  She had a CT scan that we went over the results.  She has multilevel degenerative disease with severe facet degenerative disease and multilevel spinal and foraminal stenosis.  Yesterday, she declined the surgery.  Today she is open to repeating the radiofrequency ablation but she does not want to change the medication.  Previously, the radiofrequency ablation lasted several months up to a year.  The last time she said it lasted about 3-4 month than the pain started coming back gradually over the following 3-4 months.  Overall, she still had good relief.  She is complaining of the right knee as well.  Much less pain on the left knee.  The radicular component of her back pain is in the dorsal thighs bilaterally worse on the right negligible on the left.  No new bowel or bladder symptomatology.  No fever, chills or night sweats.      Interval History 10/23/23: Alivia Thomas returns for follow up. This is a patient of Dr. Hurtado. Her visit with [place] was cancelled last week so she was placed on my schedule today. At her last visit she was having pain radiating down her legs, but did not want to have an epidural. They performed a Toradol shot (short term relief) and referred her to Neurosurgery. She saw Dr. Bartlett  today who said either she will need SCS or decompression. On review of her CT and MRI, she has severe facet arthropathy and severe canal stenosis at L3/4 and L4/5. She notes being able to walk <1 block, after which she must sit down. She reports 8-10/10 pain and this is interfering with her life significantly.       Interval History 9/18/2023:  The patient presents today to request an injection for increased back pain. She has been having worsened pain over the past couple of weeks. She does not wish to have another KERI at this time. She is having lower back pain with radiation down the back of both legs, R>L. She has associated numbness and tingling as well as subjective weakness. No bowel or bladder incontinence. She has not seen neurosurgery. Her pain today is 10/10.    Interval History 8/14/2023:  The patient is here for follow up after procedure for back pain. She is now s/p L5/S1 IL KERI with about 50% relief. She still has lower back pain with radiation down the back of the legs, stopping at the knees. She has associated numbness to lower extremities. Her pain worsens with walking, bending and reaching upward. She has been working on home exercises and stretches without much benefit. She has some benefit with medication as needed.  She report Her pain today is 9/10.    Interval History 7/10/2023:  The patient is here today for evaluation of increased lower back pain. She has a long-standing history of back pain which is mainly axial. She has had worsened symptoms over the past couple of weeks, most severe over the past 3 days. The pain now radiates down the back of both legs with burning and numbness. She finds it difficult to stand or walk for any length of time. No bowel or bladder incontinence. No fever or malaise. Medications are helping somewhat. She continues with home PT exercises as previously taught in PT. Her pain today is 10/10.    Interval History 4/10/2023:  The patient returns to clinic today for  follow up of low back and knee pain via virtual visit. She is s/p right L3,4,5 RFA on 3/6/2023. Her left side procedure was rescheduled due to illness. This is scheduled for May 1st. She reports some relief of her right sided low back pain. She continues to report left sided low back pain. She denies any radicular leg pain. She does endorse morning stiffness. She continues to perform a home exercise routine. She is taking Percocet as needed for severe pain. She denies any other health changes.     Interval History 1/30/2023:  The patient returns to clinic today for follow up of low back and knee pain. She is s/p right genicular RFA on 12/12/2022 and left genicular RFA on 1/9/2023. She reports 60% relief of her knee pain. She reports intermittent bilateral knee pain, this is tolerable at this time. She reports increased low back pain. Her pain is constant and aching in nature. Her leg pain is much improved. Her pain is worse with moving from sitting to standing. She does endorse morning stiffness. She continues to participate in physical therapy and a home exercise routine. She continues to take Percocet as needed with benefit and without adverse effects. She denies any other health changes.     Interval History 11/22/2022:  The patient returns to clinic today for follow up of low back and knee pain. She continues to report low back pain that radiates into the posterior aspect of both legs to under her feet. Her pain is worse with moving from sitting to standing. She also endorses morning stiffness. She recently started physical therapy. She has completed one session. She continues to report bilateral knee pain. She endorses worsening pain with the cold weather. Her pain is worse with standing and walking. She continues to take Percocet as needed with benefit and without adverse effects. She denies any other health changes. Her pain today is 8/10.    Interval History 10/5/2022:  She returns for follow-up.  Her knees  are doing well.  She has some discomfort but not enough to do procedures.  Her main complaint is lumbar spine pain for the past few weeks that is radiating to the dorsal aspect of both lower extremities.  She has no red flag signs/symptoms.  No new bowel or bladder symptomatology.  The pain radiates from the back down to the plantar aspect of both feet.  It is activity related.  Rest helps some but it does not resolve the pain.  She has not been in therapy for a while.  No recent imaging.  No recent weight changes.  Otherwise, no new symptomatology.  She goes regularly to her primary care physician for health maintenance.    Interval History 8/9/2022:  Alivia Thomas presents to the clinic for a follow-up appointment for low back and knee pain. She is s/p right genicular RFA on 5/9/2022 and left genicular RFA on 5/22/2022. She reports 60% relief of her knee pain. She also had panniculectomy on 6/14/2022. She is healing well. She continues to report intermittent low back pain, worse with prolonged activity. She denies any radicular leg pain. Her pain is worse prolonged standing and walking. She continues to perform a homoe exercise routine. She continues to take Percocet as needed with benefit and without adverse effects. She denies any other health changes. Her pain today is 7/10.    Interval History 4/9/2022:  The patient returns to clinic today for follow-up bilateral L3, 4, 5 RFA last month.  She reports that she is feeling very well following the procedure and reports 60-70% pain relief.  Today, she reports that her bilateral knee pain has continued to worsen, and she would like to go ahead and repeat bilateral genicular RFA as soon as possible.  She denies any new numbness, tingling, weakness, saddle anesthesia or bowel/bladder incontinence.    Interval History 12/28/2021:  The patient returns to clinic today for follow up via virtual visit. She continues to report bilateral knee pain. This pain is worse  with prolonged standing and walking. She continues to report low back and buttock pain. She denies any radicular leg pain. She continues to report a home exercise routine. She continues to report benefit with Percocet. She denies any adverse effects. She denies any other health changes.    Pain Disability Index Review:      10/23/2023     3:37 PM 9/18/2023     1:25 PM 8/14/2023     8:43 AM   Last 3 PDI Scores   Pain Disability Index (PDI) 27 24 43       Pain Medications:  Percocet 10/325 mg TID PRN pain    Opioid Contract: yes     report:  Reviewed and consistent with medication use as prescribed.    Pain Procedures:   steroid inj and visco-supplementation (series of 3) in left knee- not helpful  3/31/14 Bilateral genicular nerve blocks  2/16/16 Bilateral L2,3,4,5 MBB  3/1/16 Bilateral L2,3,4,5 MBB   4/6/16 Left L2,3,4,5 RFA- significant relief  4/20/16 Right L2,3,4,5 RFA- significant relief  10/5/16 Left L2,3,4,5 cooled RFA  10/19/16 Right L2,3,4,5 cooled RFA  4/26/17 Left L2,3,4,5 cooled RFA  5/9/17 Right L2,3,4,5 cooled RFA  12/26/2017- Left L2,3,4,5 cooled RFA  1/9/2018- Right L2,3,4,5 cooled RFA  6/26/18 Left L2,3,4,5 cooled RFA- 60% relief  7/10/18 Right L2,3,4,5 cooled RFA- 60% relief  9/28/18 TPIs- significant relief  12/27/18 Left L2,3,4,5 RFA- 70% relief  1/29/19 Right L2,3,4,5 RFA- 70% relief  6/18/19 Left L2,3,4,5 RFA- 70% relief  7/9/19 Right L2,3,4,5 RFA- 50% relief  12/19/19 Left L2,3,4,5 RFA- 80% relief  12/31/19 Right L2,3,4,5 RFA- 50% relief  3/2/2020- Right genicular nerve block- 70% relief for one month  3/12/20 Left subacromial bursa injection- 90% relief  5/18/2020- Left genicular nerve block- 70%  6/9/2020- Bilateral SI joint injections- 50% relief  10/13/2020- Right genicular RFA- 50% relief   11/3/2020- Left genicular RFA- 50% relief  12/15/2020- Left L2,3,4,5 RFA  12/29/2020- Right L2,3,4,5 RFA  4/26/2021- Left subacromial bursa'  5/11/2021- Bilateral SI joint injections   6/28/2021-  Left genicular RFA  7/13/2021- Right genicular RFA  8/24/2021- Right L2,3,4,5 RFA  9/11/2021- Left L2,3,4,5 RFA  3/7/2022- Right L2,3,4,5 RFA  3/21/2022- Left L2,3,4,5 RFA  5/9/2022- Right genicular RFA  5/23/2022- Left genicular RFA  12/12/2022- Right genicular RFA  1/9/2023- Left genicular RFA  3/6/2023- Right L3,4,5 RFA  7/24/23 L5/S1 IL KERI- 50% relief    Physical Therapy/Home Exercise: yes    Imaging:   Xray Knee 3/6/2019:  FINDINGS:  Postop bilateral knee replacements.  The the the the irregularity about the left patella with soft tissue calcifications similar.  Small joint effusion.  Some irregularity of the right patella unchanged as well.     IMPRESSION:      Postop bilateral knee replacement with irregularity about the patella as above.    MRI Cervical Spine 4/24/2018:  FINDINGS:  There is reversal of the normal cervical lordosis which could be related to patient positioning versus muscle spasm.     Cervical vertebral body heights are well maintained without evidence of acute fracture or dislocation.  Marrow signal is unremarkable.     Spinal canal contents are unremarkable.  No abnormal masses or collections.     Individual levels as detailed below:     C2-3: No significant spinal canal stenosis or neuroforaminal narrowing.     C3-4: Facet arthropathy.  No significant spinal canal stenosis or neuroforaminal narrowing.     C4-5: Facet arthropathy.  Small broad-based disc osteophyte complex.  Mild right neuroforaminal narrowing.  Mild spinal canal stenosis.     C5-6: Facet arthropathy.  Uncovertebral joint spurring.  Broad-based posterior disc osteophyte complex.  Mild right neuroforaminal narrowing.  Mild spinal canal stenosis.     C6-7: Facet arthropathy.  No significant spinal canal stenosis or neuroforaminal narrowing.     C7-T1: No significant spinal canal stenosis or neuroforaminal narrowing.     Paraspinal muscles are unremarkable.  Soft tissues are intact.     IMPRESSION:      Mild multilevel  cervical spondylosis with mild spinal canal stenosis and mild right neuroforaminal narrowing at C4-5 and C5-6.    Xray Lumbar Spine 9/21/2015:  Results: AP, lateral neutral, lateral flexion , lateral extension, bilateral oblique and spot views.  The alignment of the lumbar spine demonstrates a mild levoscoliosis .  11-mm anterior listhesis of L4 relative to L5 with no translational abnormalities   seen on flexion and extension views.  The vertebral body heights  are well-maintained  , mild disk space narrowing L4-L5 and L5-S1.   Mild anterior and marginal osteophyte formation seen  throughout the lumbar spine  .  The oblique views demonstrate no   definite spondylolysis.  There is facet joint osseous hypertrophy noted at L3-L4 and L4-L5.  IMPRESSION:         The Significant spondylosis of the lumbar spine with grade 1 anterior listhesis L4 relative to L5.  Facet joint osseous hypertrophy L3-L4 and L4-5.    Allergies:   Review of patient's allergies indicates:   Allergen Reactions    Strawberry Anaphylaxis       Current Medications:   Current Outpatient Medications   Medication Sig Dispense Refill    albuterol (VENTOLIN HFA) 90 mcg/actuation inhaler INHALE TWO PUFFS INTO LUNGS EVERY 4 TO 6 HOURS AS NEEDED FOR SHORTNESS OF BREATH AND FOR WHEEZING 18 g 1    allopurinoL (ZYLOPRIM) 300 MG tablet Take 1 tablet (300 mg total) by mouth 2 (two) times daily. 180 tablet 3    atorvastatin (LIPITOR) 20 MG tablet TAKE 1 TABLET (20 MG TOTAL) BY MOUTH ONCE DAILY. 90 tablet 2    b complex vitamins tablet Take 1 tablet by mouth every other day.       calcium citrate/vitamin D2 (ISIAH-CITRATE ORAL) Take 500 mg by mouth 2 (two) times daily.      colchicine, gout, (MITIGARE) 0.6 mg Cap TAKE 1 CAPSULE (0.6 MG TOTAL) BY MOUTH DAILY AS NEEDED (FLARE UP). 90 capsule 1    cyanocobalamin (VITAMIN B-12) 1000 MCG tablet Take 100 mcg by mouth every morning.      diclofenac sodium (VOLTAREN) 1 % Gel Apply 2 g topically 4 (four) times daily as  needed. To painful area on the feet. 500 g 5    FLUoxetine (PROZAC) 20 mg/5 mL (4 mg/mL) solution TAKE 5 MLS BY MOUTH ONCE DAILY. 450 mL 3    fluticasone propionate (FLONASE) 50 mcg/actuation nasal spray 1 SPRAY BY EACH NOSTRIL ROUTE 2 TIMES DAILY AS NEEDED FOR RHINITIS. 48 g 3    indomethacin (INDOCIN) 50 MG capsule TAKE 1 CAPSULE BY MOUTH 2 TIMES DAILY WITH MEALS 30 capsule 2    LIDOcaine (XYLOCAINE) 5 % Oint ointment APPLY TOPICALLY AS NEEDED. 35.44 g 6    MULTIVIT-IRON-MIN-FOLIC ACID 3,500-18-0.4 UNIT-MG-MG ORAL CHEW Take 1 tablet by mouth 2 (two) times daily.       nystatin (MYCOSTATIN) powder Apply topically 2 (two) times daily. 60 g 3    omeprazole (PRILOSEC) 20 MG capsule TAKE 1 CAPSULE (20 MG TOTAL) BY MOUTH EVERY MORNING. 90 capsule 2    oxybutynin (DITROPAN-XL) 5 MG TR24 TAKE 1 TABLET BY MOUTH ONCE DAILY. 90 tablet 3    oxyCODONE-acetaminophen (PERCOCET)  mg per tablet Take 1 tablet by mouth every 8 (eight) hours as needed for Pain. 90 tablet 0    tirzepatide (MOUNJARO) 2.5 mg/0.5 mL PnIj Inject 2.5 mg into the skin every 7 days. 4 pen 2    tretinoin microspheres (RETIN-A MICRO PUMP) 0.08 % GlwP Apply to affected area daily 50 g 0    urea (CARMOL) 40 % Crea Apply topically once daily. To dry skin on the feet. 120 g 5    vitamin D 185 MG Tab Take 5,000 mg by mouth every morning.        No current facility-administered medications for this visit.     Facility-Administered Medications Ordered in Other Visits   Medication Dose Route Frequency Provider Last Rate Last Admin    0.9%  NaCl infusion   Intravenous Continuous Lina Wagner MD 25 mL/hr at 12/15/20 1506 25 mL/hr at 12/15/20 1506    0.9%  NaCl infusion   Intravenous Continuous Ciro Chung MD           REVIEW OF SYSTEMS:    GENERAL:  No weight loss, malaise or fevers.  HEENT:  Negative for frequent or significant headaches.  NECK:  Negative for lumps, goiter, pain and significant neck swelling.  RESPIRATORY:  Negative for cough,  wheezing or shortness of breath.  CARDIOVASCULAR:  Negative for chest pain, leg swelling or palpitations. HTN  GI:  Negative for abdominal discomfort, blood in stools or black stools or change in bowel habits. GERD  MUSCULOSKELETAL:  See HPI.  SKIN:  Negative for lesions, rash, and itching.  PSYCH:  Negative for sleep disturbance, mood disorder and recent psychosocial stressors.  HEMATOLOGY/LYMPHOLOGY:  Negative for prolonged bleeding, bruising easily or swollen nodes.  NEURO:   No history of headaches, syncope, paralysis, seizures or tremors.  ENDO: Diabetes  All other reviewed and negative other than HPI.    Past Medical History:  Past Medical History:   Diagnosis Date    Allergy     Anemia     Asthma     Bilateral shoulder bursitis     Cervical stenosis of spine     Chronic pain     DDD (degenerative disc disease), cervical     Depression 01/28/2019    Diabetes mellitus type II     DJD (degenerative joint disease) of knee 06/19/2014    Facet arthritis of lumbar region 12/17/2015    Facet syndrome 12/17/2015    GERD (gastroesophageal reflux disease)     Heartburn     Hyperlipidemia     Hypertension     Morbid obesity     Neuromuscular disorder     Nuclear sclerotic cataract of left eye 8/8/2023    PADDY (obstructive sleep apnea)     Proteinuria     Right carpal tunnel syndrome     Sacroiliitis 06/13/2018    Sacroiliitis     Sleep apnea     Uterine fibroid        Past Surgical History:  Past Surgical History:   Procedure Laterality Date    CARPAL TUNNEL RELEASE      CARPAL TUNNEL RELEASE  1980s    left    CARPAL TUNNEL RELEASE  2012    right    CATARACT EXTRACTION W/  INTRAOCULAR LENS IMPLANT Left 8/8/2023    Procedure: EXTRACTION, CATARACT, WITH IOL INSERTION;  Surgeon: Justin Block MD;  [facility]: [facility];  Service: Ophthalmology;  Laterality: Left;    CATARACT EXTRACTION W/  INTRAOCULAR LENS IMPLANT Right 8/29/2023    Procedure: EXTRACTION, CATARACT, WITH IOL INSERTION;  Surgeon: Justin Block  MD;  Location: [facility] OR;  Service: Ophthalmology;  Laterality: Right;    COLONOSCOPY N/A 6/15/2020    Procedure: COLONOSCOPY;  Surgeon: Jc Koo MD;  Location: [facility] (4TH FLR);  Service: Endoscopy;  Laterality: N/A;  COVID screening on 6/13/20 at [facility]  pt updated on drop off location and no visitor policy-    EPIDURAL STEROID INJECTION N/A 7/24/2023    Procedure: INJECTION, STEROID, EPIDURAL, L5/S1 SOONER DATE;  Surgeon: Israel Hurtado MD;  Location: [facility] PAIN T;  Service: Pain Management;  Laterality: N/A;    ESOPHAGOGASTRODUODENOSCOPY N/A 3/27/2019    Procedure: EGD (ESOPHAGOGASTRODUODENOSCOPY);  Surgeon: Robbin Bar MD;  Location: [facility] (2ND FLR);  Service: Endoscopy;  Laterality: N/A;  BMI 61.3/2nd floor case/svn    INJECTION OF JOINT Bilateral 6/9/2020    Procedure: INJECTION, JOINT, BILATERAL SI;  Surgeon: Lina Wagner MD;  Location: [facility];  Service: Pain Management;  Laterality: Bilateral;  B/L SI Joint Injection    INJECTION OF JOINT Bilateral 5/11/2021    Procedure: INJECTION, JOINT, SACROILIAC (SI) need consent;  Surgeon: Lina Wagner MD;  Location: [facility];  Service: Pain Management;  Laterality: Bilateral;    JOINT REPLACEMENT Bilateral     with 2 revisions on rt    KNEE SURGERY  3/2010    orthroscope    KNEE SURGERY  6-19-14    left TKR    LAPAROSCOPIC SLEEVE GASTRECTOMY N/A 8/28/2019    Procedure: GASTRECTOMY, SLEEVE, LAPAROSCOPIC with intraop EGD;  Surgeon: Heriberto Clements MD;  Location: [facility] OR 2ND FLR;  Service: General;  Laterality: N/A;    PANNICULECTOMY N/A 6/14/2022    Procedure: PANNICULECTOMY;  Surgeon: Rhett Gray MD;  Location: [facility] OR 2ND FLR;  Service: Plastics;  Laterality: N/A;    RADIOFREQUENCY ABLATION Right 7/9/2019    Procedure: RADIOFREQUENCY ABLATION;  Surgeon: Lina Wagner MD;  [facility];  Service: Pain Management;  Laterality: Right;  RIGHT RFA L2,3,4,5  2 of 2    RADIOFREQUENCY  ABLATION Left 12/19/2019    Procedure: RADIOFREQUENCY ABLATION, LEFT L2-L3-L4-L5 MEDIAL BRANCH 1 OF 2;  Surgeon: Lina Wagner MD;  Location: [facility] PAIN MGT;  Service: Pain Management;  Laterality: Left;    RADIOFREQUENCY ABLATION Right 12/31/2019    Procedure: RADIOFREQUENCY ABLATION, RIGHT L2-L3-L4-L5  2 OF 2;  Surgeon: Lina Wagner MD;  Location: [facility] PAIN MGT;  Service: Pain Management;  Laterality: Right;    RADIOFREQUENCY ABLATION Right 10/13/2020    Procedure: RADIOFREQUENCY ABLATION, Genicular Cooled 1 of 2;  Surgeon: Lina Wagner MD;  Location: [facility] PAIN [facility]GT;  Service: Pain Management;  Laterality: Right;    RADIOFREQUENCY ABLATION Left 11/3/2020    Procedure: RADIOFREQUENCY ABLATION, GENICULAR COOLED  2 OF 2;  Surgeon: Lina Wagner MD;  Location: [facility];  Service: Pain Management;  Laterality: Left;    RADIOFREQUENCY ABLATION Left 12/15/2020    Procedure: RADIOFREQUENCY ABLATION LEFT L2,3,4,5 1 of 2;  Surgeon: Lina Wagner MD;  Location: [facility];  Service: Pain Management;  Laterality: Left;    RADIOFREQUENCY ABLATION Right 12/29/2020    Procedure: RADIOFREQUENCY ABLATION RIGHT L2,3,4,5 2 of 2;  Surgeon: Lina Wagner MD;  Location: [facility] PAIN MGT;  Service: Pain Management;  Laterality: Right;    RADIOFREQUENCY ABLATION Left 6/29/2021    Procedure: RADIOFREQUENCY ABLATION GENICULAR COOLED;  Surgeon: Lina Wagner MD;  Location: [facility] PAIN MGT;  Service: Pain Management;  Laterality: Left;  1 of 2    RADIOFREQUENCY ABLATION Right 7/13/2021    Procedure: RADIOFREQUENCY ABLATION GENICULAR;  Surgeon: Lina Wagner MD;  Location: [facility];  Service: Pain Management;  Laterality: Right;  2 of 2    RADIOFREQUENCY ABLATION Right 8/24/2021    Procedure: RADIOFREQUENCY ABLATION, L2-L3-L4-L5 MEDIAL BRANCH 1 OF 2;  Surgeon: Lina Wagner MD;  Location: [facility] PAIN [facility]GT;  Service: Pain Management;  Laterality: Right;    RADIOFREQUENCY ABLATION Left 9/11/2021     Procedure: RADIOFREQUENCY ABLATION, L2-L3-L4-L5 MEDIAL BR ANCH 2 OF 2;  Surgeon: Lina Wagner MD;  [facility];  Service: Pain Management;  Laterality: Left;    RADIOFREQUENCY ABLATION Right 3/7/2022    Procedure: RADIOFREQUENCY ABLATION RIGHT L3,4,5 1 of 2, needs consent;  Surgeon: Israel Hurtado MD;  Location: [facility] PAIN [facility]GT;  Service: Pain Management;  Laterality: Right;    RADIOFREQUENCY ABLATION Left 3/21/2022    Procedure: RADIOFREQUENCY ABLATION RIGHT L3,4,5 2 of 2, needs consent;  Surgeon: Israel Hurtado MD;  Location: [facility] PAIN [facility]GT;  Service: Pain Management;  Laterality: Left;    RADIOFREQUENCY ABLATION Right 5/9/2022    Procedure: RADIOFREQUENCY ABLATION RIGHT GENICULAR COOLED 1 of 2;  Surgeon: Israel Hurtado MD;  Location: [facility] PAIN [facility]GT;  Service: Pain Management;  Laterality: Right;    RADIOFREQUENCY ABLATION Left 5/23/2022    Procedure: RADIOFREQUENCY ABLATION LEFT GENICULAR COOLED  2 of 2;  Surgeon: Israel Hurtado MD;  Location: [facility] PAIN MGT;  Service: Pain Management;  Laterality: Left;    RADIOFREQUENCY ABLATION Right 12/12/2022    Procedure: RADIOFREQUENCY ABLATION RIGHT GENICULAR COOLED  ONE OF TWO  NEEDS CONSENT;  Surgeon: Israel Hurtado MD;  Location: [facility] PAIN MGT;  Service: Pain Management;  Laterality: Right;    RADIOFREQUENCY ABLATION Left 1/9/2023    Procedure: RADIOFREQUENCY ABLATION LEFT GENICULAR COOLED  TWO OF TWO NEEDS CONSENT;  Surgeon: Israel Hurtado MD;  [facility];  Service: Pain Management;  Laterality: Left;    RADIOFREQUENCY ABLATION Right 3/6/2023    Procedure: RADIOFREQUENCY ABLATION RIGHT L3,L4,L5;  Surgeon: Israel Hurtado MD;  [facility];  Service: Pain Management;  Laterality: Right;    RADIOFREQUENCY ABLATION Left 5/22/2023    Procedure: RADIOFREQUENCY ABLATION LEFT L3,L4,L5;  Surgeon: Israel Hurtado MD;  Location: [facility] PAIN MGT;  Service: Pain Management;  Laterality: Left;  4/3 LM TO R/S    RADIOFREQUENCY ABLATION OF LUMBAR MEDIAL BRANCH  NERVE AT SINGLE LEVEL Left 6/26/2018    Procedure: RADIOFREQUENCY ABLATION, NERVE, MEDIAL BRANCH, LUMBAR, 1 LEVEL;  Surgeon: Lina Wagner MD;  Location: [facility] PAIN MGT;  Service: Pain Management;  Laterality: Left;  Left Cooled RFA @ L2,3,4,5  [id number]  with Sedation    1 of 2    RADIOFREQUENCY ABLATION OF LUMBAR MEDIAL BRANCH NERVE AT SINGLE LEVEL Right 7/10/2018    Procedure: RADIOFREQUENCY ABLATION, NERVE, MEDIAL BRANCH, LUMBAR, 1 LEVEL;  Surgeon: Lina Wagner MD;  Location: [facility] PAIN MGT;  Service: Pain Management;  Laterality: Right;  RIght Cooled RFA @ L2,3,4,5  [id number] with Sedation    2 of 2    TRIGGER FINGER RELEASE Right 2017    TRIGGER FINGER RELEASE Left 7/29/2019    Procedure: RELEASE, TRIGGER FINGER, Left Thumb;  Surgeon: Velma Garcia MD;  Location: [facility] OR;  Service: Orthopedics;  Laterality: Left;  Local w/ MAC       Family History:  Family History   Problem Relation Age of Onset    Diabetes Mother     Cataracts Mother     Diabetes Father     Cataracts Father     Hypertension Sister     Diabetes Sister     No Known Problems Sister     Cancer Sister         lymphoma     Coronary artery disease Brother     Diabetes Brother     Diabetes Brother     Hypotension Brother     Kidney failure Brother     Hypotension Brother     Amblyopia Neg Hx     Blindness Neg Hx     Glaucoma Neg Hx     Macular degeneration Neg Hx     Retinal detachment Neg Hx     Strabismus Neg Hx     Stroke Neg Hx     Thyroid disease Neg Hx     Anesthesia problems Neg Hx        Social History:  Social History     Socioeconomic History    Marital status:    Tobacco Use    Smoking status: Never    Smokeless tobacco: Never   Substance and Sexual Activity    Alcohol use: Not Currently     Comment: occasionally     Drug use: No    Sexual activity: Yes     Partners: Female     Birth control/protection: None   Social History Narrative    Disabled. The patient is the youngest of 6 siblings. Single. Lives with  single-sex partner.                  Social Determinants of Health     Financial Resource Strain: Unknown (6/12/2023)    Overall Financial Resource Strain (CARDIA)     Difficulty of Paying Living Expenses: Patient refused   Food Insecurity: No Food Insecurity (6/12/2023)    Hunger Vital Sign     Worried About Running Out of Food in the Last Year: Never true     Ran Out of Food in the Last Year: Never true   Transportation Needs: No Transportation Needs (6/12/2023)    PRAPARE - Transportation     Lack of Transportation (Medical): No     Lack of Transportation (Non-Medical): No   Physical Activity: Unknown (6/12/2023)    Exercise Vital Sign     Days of Exercise per Week: Patient refused     Minutes of Exercise per Session: Patient refused   Stress: Stress Concern Present (6/12/2023)    [employer] [place] [employer] - Occupational Stress Questionnaire     Feeling of Stress : Very much   Social Connections: Unknown (6/12/2023)    Social Connection and Isolation Panel [NHANES]     Frequency of Communication with Friends and Family: Twice a week     Frequency of Social Gatherings with Friends and Family: Never     Attends Yazdanism Services: More than 4 times per year     Active Member of Clubs or Organizations: No     Attends Club or Organization Meetings: Never     Marital Status: Patient refused   Housing Stability: Unknown (6/12/2023)    Housing Stability Vital Sign     Unable to Pay for Housing in the Last Year: Patient refused     Unstable Housing in the Last Year: Patient refused       OBJECTIVE:    VITALS:    LMP 06/26/2018     PREVIOUS PHYSICAL EXAMINATION:    GENERAL: Well appearing, in no acute distress, alert and oriented x3.   PSYCH:  Mood and affect appropriate. Tearful and guarding on exam.   SKIN: Skin color, texture, turgor normal, no rashes or lesions.   HEAD/FACE:  Normocephalic, atraumatic.   CV: RRR with palpation of the radial artery.  PULM: No evidence of respiratory difficulty, symmetric  chest rise.  BACK: Negative SLR bilaterally. SLR is positive B to the dorsal thigh down to the knee the right worse than the left. There is pain with palpation over midline lumbar spine. Limited ROM with pain on flexion and extension.  Positive facet loading bilaterally. Guarding over the right GTB.   EXTREMITIES: No edema  MUSCULOSKELETAL: 4/5 strength in right ankle with plantar and dorsiflexion. 4/5 strength in left ankle with plantar and dorsiflexion. 5/5 strength with right knee flexion and extension. 5/5 strength with left knee flexion and extension. 5/5 strength in right EHL, 5/5 strength in left EHL.  NEURO:  Plantar response are downgoing. No clonus. Normal sensation.   GAIT: Antalgic- ambulates without assistance.      ASSESSMENT: 57 y.o. year old female with low back and knee pain, consistent with the followin. Spondylosis of lumbar region without myelopathy or radiculopathy  Procedure Order to Pain Management    Procedure Order to Pain Management      2. Primary osteoarthritis of both knees  Procedure Order to Pain Management      3. Spinal stenosis of lumbar region, unspecified whether neurogenic claudication present                PLAN:   We discussed with the patient the assessment and recommendations. The following is the plan we agreed on:   - Previous imaging reviewed today.    - Reviewed NS recommendations: SCS vs Decompression/fusion she is so wants to think about it.     - We reviewed her options. She is not ready to make a decision.     - She is requesting increased pain medication. She is already on  Percocet 10/325 mg TID PRN.  Agreed to stay on the same dose.  Declined changing to MS Contin q.12 hours.    - she wants to try the radiofrequency ablation again.  We will schedule right than left L3, 4 and 5 radiofrequency ablation than right genicular cooled radiofrequency ablation 3 nerves.

## 2023-10-25 DIAGNOSIS — M47.816 SPONDYLOSIS OF LUMBAR REGION WITHOUT MYELOPATHY OR RADICULOPATHY: Primary | ICD-10-CM

## 2023-10-25 DIAGNOSIS — M17.0 PRIMARY OSTEOARTHRITIS OF BOTH KNEES: Primary | ICD-10-CM

## 2023-10-26 ENCOUNTER — TELEPHONE (OUTPATIENT)
Dept: INTERNAL MEDICINE | Facility: CLINIC | Age: 58
End: 2023-10-26
Payer: MEDICARE

## 2023-10-29 ENCOUNTER — PATIENT MESSAGE (OUTPATIENT)
Dept: PAIN MEDICINE | Facility: OTHER | Age: 58
End: 2023-10-29
Payer: MEDICARE

## 2023-11-08 DIAGNOSIS — M47.816 LUMBAR SPONDYLOSIS: ICD-10-CM

## 2023-11-08 DIAGNOSIS — G89.4 CHRONIC PAIN SYNDROME: Primary | ICD-10-CM

## 2023-11-09 RX ORDER — OXYCODONE AND ACETAMINOPHEN 10; 325 MG/1; MG/1
1 TABLET ORAL EVERY 8 HOURS PRN
Qty: 90 TABLET | Refills: 0 | Status: SHIPPED | OUTPATIENT
Start: 2023-11-09 | End: 2023-12-04 | Stop reason: SDUPTHER

## 2023-11-10 RX ORDER — TIZANIDINE 4 MG/1
4 TABLET ORAL EVERY 8 HOURS PRN
Qty: 90 TABLET | Refills: 2 | Status: SHIPPED | OUTPATIENT
Start: 2023-11-10 | End: 2024-01-17 | Stop reason: SDUPTHER

## 2023-11-13 DIAGNOSIS — E11.9 TYPE 2 DIABETES MELLITUS WITHOUT COMPLICATION, WITHOUT LONG-TERM CURRENT USE OF INSULIN: ICD-10-CM

## 2023-11-13 NOTE — TELEPHONE ENCOUNTER
No care due was identified.  [facility] Embedded Care Due Messages. Reference number: [id number].   11/13/2023 10:02:00 AM CST

## 2023-11-14 ENCOUNTER — PATIENT MESSAGE (OUTPATIENT)
Dept: BARIATRICS | Facility: CLINIC | Age: 58
End: 2023-11-14
Payer: MEDICARE

## 2023-11-26 RX ORDER — TIRZEPATIDE 2.5 MG/.5ML
2.5 INJECTION, SOLUTION SUBCUTANEOUS
Qty: 12 PEN | Refills: 1 | Status: SHIPPED | OUTPATIENT
Start: 2023-11-26 | End: 2024-03-19

## 2023-11-27 ENCOUNTER — HOSPITAL ENCOUNTER (OUTPATIENT)
Facility: OTHER | Age: 58
Discharge: HOME | End: 2023-11-27
Attending: ANESTHESIOLOGY | Admitting: ANESTHESIOLOGY
Payer: MEDICARE

## 2023-11-27 VITALS
RESPIRATION RATE: 16 BRPM | HEART RATE: 71 BPM | WEIGHT: 246 LBS | DIASTOLIC BLOOD PRESSURE: 75 MMHG | SYSTOLIC BLOOD PRESSURE: 135 MMHG | TEMPERATURE: 98 F | HEIGHT: 65 IN | OXYGEN SATURATION: 99 % | BODY MASS INDEX: 40.98 KG/M2

## 2023-11-27 DIAGNOSIS — M47.816 ARTHRITIS OF FACET JOINT OF LUMBAR SPINE: ICD-10-CM

## 2023-11-27 DIAGNOSIS — M47.816 LUMBAR SPONDYLOSIS: Primary | ICD-10-CM

## 2023-11-27 DIAGNOSIS — G89.29 CHRONIC PAIN: ICD-10-CM

## 2023-11-27 LAB — POCT GLUCOSE: 109 MG/DL (ref 70–110)

## 2023-11-27 PROCEDURE — 64635 PR DESTROY LUMB/SAC FACET JNT: ICD-10-PCS | Mod: RT,,, | Performed by: ANESTHESIOLOGY

## 2023-11-27 PROCEDURE — 99152 MOD SED SAME PHYS/QHP 5/>YRS: CPT | Mod: ,,, | Performed by: ANESTHESIOLOGY

## 2023-11-27 PROCEDURE — 99152 MOD SED SAME PHYS/QHP 5/>YRS: CPT | Performed by: ANESTHESIOLOGY

## 2023-11-27 PROCEDURE — 64636 DESTROY L/S FACET JNT ADDL: CPT | Mod: RT | Performed by: ANESTHESIOLOGY

## 2023-11-27 PROCEDURE — 64635 DESTROY LUMB/SAC FACET JNT: CPT | Mod: RT,,, | Performed by: ANESTHESIOLOGY

## 2023-11-27 PROCEDURE — 64635 DESTROY LUMB/SAC FACET JNT: CPT | Mod: RT | Performed by: ANESTHESIOLOGY

## 2023-11-27 PROCEDURE — 25000003 PHARM REV CODE 250: Performed by: STUDENT IN AN ORGANIZED HEALTH CARE EDUCATION/TRAINING PROGRAM

## 2023-11-27 PROCEDURE — 63600175 PHARM REV CODE 636 W HCPCS: Performed by: ANESTHESIOLOGY

## 2023-11-27 PROCEDURE — 99152 PR MOD CONSCIOUS SEDATION, SAME PHYS, 5+ YRS, FIRST 15 MIN: ICD-10-PCS | Mod: ,,, | Performed by: ANESTHESIOLOGY

## 2023-11-27 PROCEDURE — 64636 PR DESTROY L/S FACET JNT ADDL: ICD-10-PCS | Mod: RT,,, | Performed by: ANESTHESIOLOGY

## 2023-11-27 PROCEDURE — 64636 DESTROY L/S FACET JNT ADDL: CPT | Mod: RT,,, | Performed by: ANESTHESIOLOGY

## 2023-11-27 RX ORDER — SODIUM CHLORIDE 9 MG/ML
INJECTION, SOLUTION INTRAVENOUS CONTINUOUS
Status: DISCONTINUED | OUTPATIENT
Start: 2023-11-27 | End: 2023-11-27 | Stop reason: HOSPADM

## 2023-11-27 RX ORDER — MIDAZOLAM HYDROCHLORIDE 1 MG/ML
INJECTION INTRAMUSCULAR; INTRAVENOUS
Status: DISCONTINUED | OUTPATIENT
Start: 2023-11-27 | End: 2023-11-27 | Stop reason: HOSPADM

## 2023-11-27 RX ORDER — FENTANYL CITRATE 50 UG/ML
INJECTION, SOLUTION INTRAMUSCULAR; INTRAVENOUS
Status: DISCONTINUED | OUTPATIENT
Start: 2023-11-27 | End: 2023-11-27 | Stop reason: HOSPADM

## 2023-11-27 RX ORDER — DEXAMETHASONE SODIUM PHOSPHATE 10 MG/ML
INJECTION INTRAMUSCULAR; INTRAVENOUS
Status: DISCONTINUED | OUTPATIENT
Start: 2023-11-27 | End: 2023-11-27 | Stop reason: HOSPADM

## 2023-11-27 RX ORDER — BUPIVACAINE HYDROCHLORIDE 2.5 MG/ML
INJECTION, SOLUTION EPIDURAL; INFILTRATION; INTRACAUDAL
Status: DISCONTINUED | OUTPATIENT
Start: 2023-11-27 | End: 2023-11-27 | Stop reason: HOSPADM

## 2023-11-27 NOTE — DISCHARGE INSTRUCTIONS

## 2023-11-27 NOTE — OP NOTE
Therapeutic Lumbar Medial Branch Radiofrequency Ablation under Fluoroscopy     The procedure, risks, benefits, and options were discussed with the patient. There are no contraindications to the procedure. The patent expressed understanding and agreed to the procedure. Informed written consent was obtained prior to the start of the procedure and can be found in the patient's chart.        PATIENT NAME: Alivia Thomas   MRN: [id number]     DATE OF PROCEDURE: 11/27/2023     PROCEDURE:  Right L3, L4, and L5 Lumbar Radiofrequency Ablation under Fluoroscopy    PRE-OP DIAGNOSIS: Spondylosis of lumbar region without myelopathy or radiculopathy [M47.816] Lumbar spondylosis [M47.816]    POST-OP DIAGNOSIS: Same    PHYSICIAN: Israel Hurtado MD    ASSISTANTS: None     MEDICATIONS INJECTED:  Preservative-free Decadron 10mg with 9cc of Bupivicaine 0.25%    LOCAL ANESTHETIC INJECTED:   Xylocaine 2%    SEDATION: Versed 2mg and Fentanyl 25mcg                                                                                                                                                                                     Conscious sedation ordered by M.D. Patient re-evaluation prior to administration of conscious sedation. No changes noted in patient's status from initial evaluation. The patient's vital signs were monitored by RN and patient remained hemodynamically stable throughout the procedure.    Event Time In   Sedation Start 0813   Sedation End 0827       ESTIMATED BLOOD LOSS:  None    COMPLICATIONS:  None     INTERVAL HISTORY: Patient has clinical and imaging findings suggestive of facet mediated pain. Patients has completed 2 previous diagnostic medial branch blocks at specified levels with at least 80% relief for the expected duration of the local anesthetic utilized.    TECHNIQUE: Time-out was performed to identify the patient and procedure to be performed. With the patient laying in a prone position, the surgical area was prepped  and draped in the usual sterile fashion using ChloraPrep and fenestrated drape. The levels were determined under fluoroscopic guidance. Skin anesthesia was achieved by injecting Lidocaine 2% over the injection sites. A 20 gauge short 100mm curved active tip needle was introduced to the anatomic local of the medial branch at each of the above levels using AP, lateral and/or contralateral oblique fluoroscopic imaging. Then sensory and motor testing was performed to confirm that the needle tips were in the correct location. After negative aspiration for blood or CSF was confirmed, 1 mL of the lidocaine 2% listed above was injected slowly at each site. This was followed by thermal lesioning at 80 degrees celsius for 90 seconds. That was followed by slowly injecting 1.5 mL of the medication mixture listed above at each site. The needles were removed and bleeding was nil. A sterile dressing was applied. No specimens collected. The patient tolerated the procedure well and did not have any procedure related motor deficit at the conclusion of the procedure.    PRE-PROCEDURE PAIN SCORE: 10/10    POST-PROCEDURE PAIN SCORE: 0/10    The patient was monitored after the procedure in the recovery area. They were given post-procedure and discharge instructions to follow at home. The patient was discharged in a stable condition.        Israel Hurtado MD

## 2023-11-27 NOTE — H&P
HPI  Patient presenting for Procedure(s) (LRB):  RADIOFREQUENCY ABLATION RIGHT L3, 4, 5 1 OF 3 (Right)       Past Medical History:   Diagnosis Date    Allergy     Anemia     Asthma     Bilateral shoulder bursitis     Cervical stenosis of spine     Chronic pain     DDD (degenerative disc disease), cervical     Depression 01/28/2019    Diabetes mellitus type II     DJD (degenerative joint disease) of knee 06/19/2014    Facet arthritis of lumbar region 12/17/2015    Facet syndrome 12/17/2015    GERD (gastroesophageal reflux disease)     Heartburn     Hyperlipidemia     Hypertension     Morbid obesity     Neuromuscular disorder     Nuclear sclerotic cataract of left eye 8/8/2023    PADDY (obstructive sleep apnea)     Proteinuria     Right carpal tunnel syndrome     Sacroiliitis 06/13/2018    Sacroiliitis     Sleep apnea     Uterine fibroid      Past Surgical History:   Procedure Laterality Date    CARPAL TUNNEL RELEASE      CARPAL TUNNEL RELEASE  1980s    left    CARPAL TUNNEL RELEASE  2012    right    CATARACT EXTRACTION W/  INTRAOCULAR LENS IMPLANT Left 8/8/2023    Procedure: EXTRACTION, CATARACT, WITH IOL INSERTION;  Surgeon: Justin Block MD;  Location: [facility] OR;  Service: Ophthalmology;  Laterality: Left;    CATARACT EXTRACTION W/  INTRAOCULAR LENS IMPLANT Right 8/29/2023    Procedure: EXTRACTION, CATARACT, WITH IOL INSERTION;  Surgeon: Justin Block MD;  Location: [facility] OR;  Service: Ophthalmology;  Laterality: Right;    COLONOSCOPY N/A 6/15/2020    Procedure: COLONOSCOPY;  Surgeon: Jc Koo MD;  Location: [facility] ENDO ([facility] FLR);  Service: Endoscopy;  Laterality: N/A;  COVID screening on 6/13/20 at [facility]  pt updated on drop off location and no visitor policy-rb    EPIDURAL STEROID INJECTION N/A 7/24/2023    Procedure: INJECTION, STEROID, EPIDURAL, L5/S1 SOONER DATE;  Surgeon: Israel Hurtado MD;  Location: [facility] PAIN MGT;  Service: Pain Management;  Laterality: N/A;     ESOPHAGOGASTRODUODENOSCOPY N/A 3/27/2019    Procedure: EGD (ESOPHAGOGASTRODUODENOSCOPY);  Surgeon: Robbin Bar MD;  Location: [facility] ([address] [place] [address]);  Service: Endoscopy;  Laterality: N/A;  BMI 61.3/2nd floor case/svn    INJECTION OF JOINT Bilateral 6/9/2020    Procedure: INJECTION, JOINT, BILATERAL SI;  Surgeon: Lina Wagner MD;  Location: [facility] PAIN MGT;  Service: Pain Management;  Laterality: Bilateral;  B/L SI Joint Injection    INJECTION OF JOINT Bilateral 5/11/2021    Procedure: INJECTION, JOINT, SACROILIAC (SI) need consent;  Surgeon: Lina Wagner MD;  Location: [facility] PAIN MGT;  Service: Pain Management;  Laterality: Bilateral;    JOINT REPLACEMENT Bilateral     with 2 revisions on rt    KNEE SURGERY  3/2010    orthroscope    KNEE SURGERY  6-19-14    left TKR    LAPAROSCOPIC SLEEVE GASTRECTOMY N/A 8/28/2019    Procedure: GASTRECTOMY, SLEEVE, LAPAROSCOPIC with intraop EGD;  Surgeon: Heriberto Clements MD;  [facility];  Service: General;  Laterality: N/A;    PANNICULECTOMY N/A 6/14/2022    Procedure: PANNICULECTOMY;  Surgeon: Rhett Gray MD;  Location: [facility] OR [address] [place] [address];  Service: Plastics;  Laterality: N/A;    RADIOFREQUENCY ABLATION Right 7/9/2019    Procedure: RADIOFREQUENCY ABLATION;  Surgeon: Lina Wagner MD;  Location: [facility];  Service: Pain Management;  Laterality: Right;  RIGHT RFA L2,3,4,5  2 of 2    RADIOFREQUENCY ABLATION Left 12/19/2019    Procedure: RADIOFREQUENCY ABLATION, LEFT L2-L3-L4-L5 MEDIAL BRANCH 1 OF 2;  Surgeon: Lina Wagner MD;  Location: [facility] PAIN MGT;  Service: Pain Management;  Laterality: Left;    RADIOFREQUENCY ABLATION Right 12/31/2019    Procedure: RADIOFREQUENCY ABLATION, RIGHT L2-L3-L4-L5  2 OF 2;  Surgeon: Lina Wagenr MD;  Location: [facility] PAIN MGT;  Service: Pain Management;  Laterality: Right;    RADIOFREQUENCY ABLATION Right 10/13/2020    Procedure: RADIOFREQUENCY ABLATION, Genicular Cooled 1 of 2;  Surgeon:  Lina Wagner MD;  Location: [facility] PAIN MGT;  Service: Pain Management;  Laterality: Right;    RADIOFREQUENCY ABLATION Left 11/3/2020    Procedure: RADIOFREQUENCY ABLATION, GENICULAR COOLED  2 OF 2;  Surgeon: Lina Wagner MD;  Location: [facility] PAIN MGT;  Service: Pain Management;  Laterality: Left;    RADIOFREQUENCY ABLATION Left 12/15/2020    Procedure: RADIOFREQUENCY ABLATION LEFT L2,3,4,5 1 of 2;  Surgeon: Lina Wagner MD;  Location: [facility] PAIN MGT;  Service: Pain Management;  Laterality: Left;    RADIOFREQUENCY ABLATION Right 12/29/2020    Procedure: RADIOFREQUENCY ABLATION RIGHT L2,3,4,5 2 of 2;  Surgeon: Lina Wagner MD;  Location: [facility] PAIN MGT;  Service: Pain Management;  Laterality: Right;    RADIOFREQUENCY ABLATION Left 6/29/2021    Procedure: RADIOFREQUENCY ABLATION GENICULAR COOLED;  Surgeon: Lina Wagner MD;  Location: [facility] PAIN MGT;  Service: Pain Management;  Laterality: Left;  1 of 2    RADIOFREQUENCY ABLATION Right 7/13/2021    Procedure: RADIOFREQUENCY ABLATION GENICULAR;  Surgeon: Lina Wagner MD;  Location: [facility] PAIN MGT;  Service: Pain Management;  Laterality: Right;  2 of 2    RADIOFREQUENCY ABLATION Right 8/24/2021    Procedure: RADIOFREQUENCY ABLATION, L2-L3-L4-L5 MEDIAL BRANCH 1 OF 2;  Surgeon: Lina Wagner MD;  Location: [facility] PAIN MGT;  Service: Pain Management;  Laterality: Right;    RADIOFREQUENCY ABLATION Left 9/11/2021    Procedure: RADIOFREQUENCY ABLATION, L2-L3-L4-L5 MEDIAL BR ANCH 2 OF 2;  Surgeon: Lina Wagner MD;  Location: [facility] PAIN MGT;  Service: Pain Management;  Laterality: Left;    RADIOFREQUENCY ABLATION Right 3/7/2022    Procedure: RADIOFREQUENCY ABLATION RIGHT L3,4,5 1 of 2, needs consent;  Surgeon: Israel Hurtado MD;  Location: [facility] PAIN MGT;  Service: Pain Management;  Laterality: Right;    RADIOFREQUENCY ABLATION Left 3/21/2022    Procedure: RADIOFREQUENCY ABLATION RIGHT L3,4,5 2 of 2, needs consent;  Surgeon: Israel Hurtado MD;   Location: [facility] PAIN MGT;  Service: Pain Management;  Laterality: Left;    RADIOFREQUENCY ABLATION Right 5/9/2022    Procedure: RADIOFREQUENCY ABLATION RIGHT GENICULAR COOLED 1 of 2;  Surgeon: Israel Hurtado MD;  Location: [facility] PAIN MGT;  Service: Pain Management;  Laterality: Right;    RADIOFREQUENCY ABLATION Left 5/23/2022    Procedure: RADIOFREQUENCY ABLATION LEFT GENICULAR COOLED  2 of 2;  Surgeon: Israel Hurtado MD;  Location: [facility] PAIN MGT;  Service: Pain Management;  Laterality: Left;    RADIOFREQUENCY ABLATION Right 12/12/2022    Procedure: RADIOFREQUENCY ABLATION RIGHT GENICULAR COOLED  ONE OF TWO  NEEDS CONSENT;  Surgeon: Israel Hurtado MD;  Location: [facility] PAIN MGT;  Service: Pain Management;  Laterality: Right;    RADIOFREQUENCY ABLATION Left 1/9/2023    Procedure: RADIOFREQUENCY ABLATION LEFT GENICULAR COOLED  TWO OF TWO NEEDS CONSENT;  Surgeon: Israel Hurtado MD;  Location: [facility] PAIN MGT;  Service: Pain Management;  Laterality: Left;    RADIOFREQUENCY ABLATION Right 3/6/2023    Procedure: RADIOFREQUENCY ABLATION RIGHT L3,L4,L5;  Surgeon: Israel Hurtado MD;  Location: [facility] PAIN MGT;  Service: Pain Management;  Laterality: Right;    RADIOFREQUENCY ABLATION Left 5/22/2023    Procedure: RADIOFREQUENCY ABLATION LEFT L3,L4,L5;  Surgeon: Israel Hurtado MD;  Location: [facility] PAIN MGT;  Service: Pain Management;  Laterality: Left;  4/3 LM TO R/S    RADIOFREQUENCY ABLATION OF LUMBAR MEDIAL BRANCH NERVE AT SINGLE LEVEL Left 6/26/2018    Procedure: RADIOFREQUENCY ABLATION, NERVE, MEDIAL BRANCH, LUMBAR, 1 LEVEL;  Surgeon: Lina Wagner MD;  Location: [facility] PAIN MGT;  Service: Pain Management;  Laterality: Left;  Left Cooled RFA @ L2,3,4,5  [id number]  with Sedation    1 of 2    RADIOFREQUENCY ABLATION OF LUMBAR MEDIAL BRANCH NERVE AT SINGLE LEVEL Right 7/10/2018    Procedure: RADIOFREQUENCY ABLATION, NERVE, MEDIAL BRANCH, LUMBAR, 1 LEVEL;  Surgeon: Lina Wagner MD;  Location: [facility] PAIN MGT;  Service: Pain Management;   Laterality: Right;  RIght Cooled RFA @ L2,3,4,5  [id number] with Sedation    2 of 2    TRIGGER FINGER RELEASE Right 2017    TRIGGER FINGER RELEASE Left 7/29/2019    Procedure: RELEASE, TRIGGER FINGER, Left Thumb;  Surgeon: Velma Garcia MD;  Location: [facility];  Service: Orthopedics;  Laterality: Left;  Local w/ MAC     Review of patient's allergies indicates:   Allergen Reactions    Strawberry Anaphylaxis      Current Facility-Administered Medications   Medication    0.9%  NaCl infusion     Facility-Administered Medications Ordered in Other Encounters   Medication    0.9%  NaCl infusion    0.9%  NaCl infusion       PMHx, PSHx, Allergies, Medications reviewed in epic    ROS negative except pain complaints in HPI    OBJECTIVE:    LMP 06/26/2018   Breastfeeding No     PHYSICAL EXAMINATION:    GENERAL: Well appearing, in no acute distress, alert and oriented x3.  PSYCH:  Mood and affect appropriate.  SKIN: Skin color, texture, turgor normal, no rashes or lesions which will impact the procedure.  CV: RRR with palpation of the radial artery.  PULM: No evidence of respiratory difficulty, symmetric chest rise. Clear to auscultation.  NEURO: Cranial nerves grossly intact.    Plan:    Proceed with procedure as planned Procedure(s) (LRB):  RADIOFREQUENCY ABLATION RIGHT L3, 4, 5 1 OF 3 (Right)    Israel Hurtado  11/27/2023

## 2023-11-27 NOTE — DISCHARGE SUMMARY
Discharge Note  Short Stay      SUMMARY     Admit Date: 11/27/2023    Attending Physician: Israel Hurtado      Discharge Physician: Israel Hurtado      Discharge Date: 11/27/2023 8:14 AM    Procedure(s) (LRB):  RADIOFREQUENCY ABLATION RIGHT L3, 4, 5 1 OF 3 (Right)    Final Diagnosis: Spondylosis of lumbar region without myelopathy or radiculopathy [M47.816]    Disposition: Home or self care    Patient Instructions:   Current Discharge Medication List        CONTINUE these medications which have NOT CHANGED    Details   tiZANidine (ZANAFLEX) 4 MG tablet TAKE 1 TABLET (4 MG TOTAL) BY MOUTH EVERY 8 (EIGHT) HOURS AS NEEDED (MUSCLE PAIN).  Qty: 90 tablet, Refills: 2      albuterol (VENTOLIN HFA) 90 mcg/actuation inhaler INHALE TWO PUFFS INTO LUNGS EVERY 4 TO 6 HOURS AS NEEDED FOR SHORTNESS OF BREATH AND FOR WHEEZING  Qty: 18 g, Refills: 1    Comments: Please consider 90 day supplies to promote better adherence  Associated Diagnoses: Asthma, well controlled, mild intermittent      allopurinoL (ZYLOPRIM) 300 MG tablet Take 1 tablet (300 mg total) by mouth 2 (two) times daily.  Qty: 180 tablet, Refills: 3    Associated Diagnoses: Gout, unspecified cause, unspecified chronicity, unspecified site      atorvastatin (LIPITOR) 20 MG tablet TAKE 1 TABLET (20 MG TOTAL) BY MOUTH ONCE DAILY.  Qty: 90 tablet, Refills: 2      b complex vitamins tablet Take 1 tablet by mouth every other day.       calcium citrate/vitamin D2 (ISIAH-CITRATE ORAL) Take 500 mg by mouth 2 (two) times daily.      colchicine, gout, (MITIGARE) 0.6 mg Cap TAKE 1 CAPSULE (0.6 MG TOTAL) BY MOUTH DAILY AS NEEDED (FLARE UP).  Qty: 90 capsule, Refills: 1    Associated Diagnoses: Gout, unspecified cause, unspecified chronicity, unspecified site      cyanocobalamin (VITAMIN B-12) 1000 MCG tablet Take 100 mcg by mouth every morning.      diclofenac sodium (VOLTAREN) 1 % Gel Apply 2 g topically 4 (four) times daily as needed. To painful area on the feet.  Qty: 500 g, Refills: 5     Comments: 5 x 100g tubes  Associated Diagnoses: Right foot pain; Enthesopathy of foot      FLUoxetine (PROZAC) 20 mg/5 mL (4 mg/mL) solution TAKE 5 MLS BY MOUTH ONCE DAILY.  Qty: 450 mL, Refills: 3      fluticasone propionate (FLONASE) 50 mcg/actuation nasal spray 1 SPRAY BY EACH NOSTRIL ROUTE 2 TIMES DAILY AS NEEDED FOR RHINITIS.  Qty: 48 g, Refills: 3      indomethacin (INDOCIN) 50 MG capsule TAKE 1 CAPSULE BY MOUTH 2 TIMES DAILY WITH MEALS  Qty: 30 capsule, Refills: 2    Associated Diagnoses: Right foot pain; Enthesopathy of foot      LIDOcaine (XYLOCAINE) 5 % Oint ointment APPLY TOPICALLY AS NEEDED.  Qty: 35.44 g, Refills: 6    Associated Diagnoses: Right foot pain      MOUNJARO 2.5 mg/0.5 mL PnIj INJECT 2.5 MG INTO THE SKIN EVERY 7 DAYS.  Qty: 12 Pen, Refills: 1    Associated Diagnoses: Type 2 diabetes mellitus without complication, without long-term current use of insulin      MULTIVIT-IRON-MIN-FOLIC ACID 3,500-18-0.4 UNIT-MG-MG ORAL CHEW Take 1 tablet by mouth 2 (two) times daily.       nystatin (MYCOSTATIN) powder Apply topically 2 (two) times daily.  Qty: 60 g, Refills: 3      omeprazole (PRILOSEC) 20 MG capsule TAKE 1 CAPSULE (20 MG TOTAL) BY MOUTH EVERY MORNING.  Qty: 90 capsule, Refills: 2    Associated Diagnoses: Gastroesophageal reflux disease without esophagitis      oxybutynin (DITROPAN-XL) 5 MG TR24 TAKE 1 TABLET BY MOUTH ONCE DAILY.  Qty: 90 tablet, Refills: 3    Associated Diagnoses: Mixed incontinence urge and stress (male)(female)      oxyCODONE-acetaminophen (PERCOCET)  mg per tablet Take 1 tablet by mouth every 8 (eight) hours as needed for Pain.  Qty: 90 tablet, Refills: 0    Comments: Quantity prescribed more than 7 day supply? Yes, quantity medically necessary  Associated Diagnoses: Lumbar spondylosis; Chronic pain syndrome      tretinoin microspheres (RETIN-A MICRO PUMP) 0.08 % GlwP Apply to affected area daily  Qty: 50 g, Refills: 0    Associated Diagnoses: Plantar wart      urea  (CARMOL) 40 % Crea Apply topically once daily. To dry skin on the feet.  Qty: 120 g, Refills: 5    Associated Diagnoses: Dry skin      vitamin D 185 MG Tab Take 5,000 mg by mouth every morning.                  Discharge Diagnosis: Spondylosis of lumbar region without myelopathy or radiculopathy [M47.816]  Condition on Discharge: Stable with no complications to procedure   Diet on Discharge: Same as before.  Activity: as per instruction sheet.  Discharge to: Home with a responsible adult.  Follow up: 2-4 weeks       Please call my office or pager at [phone] if experienced any weakness or loss of sensation, fever > 101.5, pain uncontrolled with oral medications, persistent nausea/vomiting/or diarrhea, redness or drainage from the incisions, or any other worrisome concerns. If physician on call was not reached or could not communicate with our office for any reason please go to the nearest emergency department

## 2023-12-03 DIAGNOSIS — Z71.89 COMPLEX CARE COORDINATION: ICD-10-CM

## 2023-12-04 ENCOUNTER — TELEPHONE (OUTPATIENT)
Dept: INTERNAL MEDICINE | Facility: CLINIC | Age: 58
End: 2023-12-04
Payer: MEDICARE

## 2023-12-04 DIAGNOSIS — G89.4 CHRONIC PAIN SYNDROME: Primary | ICD-10-CM

## 2023-12-04 DIAGNOSIS — M47.816 LUMBAR SPONDYLOSIS: ICD-10-CM

## 2023-12-04 RX ORDER — OXYCODONE AND ACETAMINOPHEN 10; 325 MG/1; MG/1
1 TABLET ORAL EVERY 8 HOURS PRN
Qty: 90 TABLET | Refills: 0 | Status: ON HOLD | OUTPATIENT
Start: 2023-12-04 | End: 2024-01-06 | Stop reason: HOSPADM

## 2023-12-04 RX ORDER — PROMETHAZINE HYDROCHLORIDE AND DEXTROMETHORPHAN HYDROBROMIDE 6.25; 15 MG/5ML; MG/5ML
5 SYRUP ORAL EVERY 8 HOURS PRN
Qty: 118 ML | Refills: 0 | Status: SHIPPED | OUTPATIENT
Start: 2023-12-04 | End: 2023-12-14

## 2023-12-04 RX ORDER — LEVOFLOXACIN 500 MG/1
500 TABLET, FILM COATED ORAL DAILY
Qty: 10 TABLET | Refills: 0 | Status: ON HOLD | OUTPATIENT
Start: 2023-12-04 | End: 2024-01-06 | Stop reason: HOSPADM

## 2023-12-04 NOTE — TELEPHONE ENCOUNTER
----- Message from Kristin Gaviria sent at 12/4/2023  1:45 PM CST -----  Contact: [phone]  1MEDICALADVICE     Patient is calling for Medical Advice regarding: bad Cough.     How long has patient had these symptoms: over a week     Pharmacy name and phone#:   [facility] - [place], [place] - [address]  [address] [facility] [address]  Phone: [phone] Fax: [phone]       Would like response via mychart:  Phone call     Comments:

## 2023-12-04 NOTE — TELEPHONE ENCOUNTER
Pt with no fever or SOB - significant cough - symptoms ongoing for approx 10 days - levaquin and phenergan DM sent - ED worsening appt for lack of improvement.

## 2023-12-04 NOTE — TELEPHONE ENCOUNTER
Spoke to pt and informed Dr Richardson is in clinic and will contact her when she's finish clinic.

## 2023-12-04 NOTE — TELEPHONE ENCOUNTER
Patient requesting refill on    oxyCODONE-acetaminophen (PERCOCET)  mg      Last office visit 10/24/23   shows last refill on 11/09/23  Patient does not have a pain contract on file with [facility] department  Patient last UDS 08/14/23 was consistent with current therapy                CODEINE  Not Detected  Not Detected Not Detected  Not Detected    MORPHINE  Not Detected  Not Detected Not Detected  Not Detected    6-ACETYLMORPHINE  Not Detected  Not Detected Not Detected  Not Detected    OXYCODONE  Present  Present Present  Not Detected    NOROYXCODONE  Present  Present Present  Present    OXYMORPHONE  Present  Present Present  Not Detected    NOROXYMORPHONE  Not Detected  Not Detected Not Detected  Not Detected    HYDROCODONE  Not Detected  Not Detected Not Detected  Not Detected    NORHYDROCODONE  Not Detected  Not Detected Not Detected  Not Detected    HYDROMORPHONE  Not Detected

## 2023-12-04 NOTE — TELEPHONE ENCOUNTER
----- Message from Mandie Ketan sent at 12/4/2023  7:11 AM CST -----  Contact: [phone] [employer]  1MEDICALADVICE     Patient is calling for Medical Advice regarding:Coughing and hoarseness voice    How long has patient had these symptoms:    Pharmacy name and phone#:  [facility] - [place], [place] - [address]  [address]  [facility] [address]  Phone: [phone] Fax: [phone]        Would like response via [employer]:  call     Comments:need Rx for Cold and Coughing. OTC Rx is not working

## 2023-12-07 ENCOUNTER — PATIENT MESSAGE (OUTPATIENT)
Dept: PAIN MEDICINE | Facility: OTHER | Age: 58
End: 2023-12-07
Payer: MEDICARE

## 2023-12-11 DIAGNOSIS — M47.816 SPONDYLOSIS OF LUMBAR REGION WITHOUT MYELOPATHY OR RADICULOPATHY: Primary | ICD-10-CM

## 2023-12-12 ENCOUNTER — PATIENT MESSAGE (OUTPATIENT)
Dept: BARIATRICS | Facility: CLINIC | Age: 58
End: 2023-12-12
Payer: MEDICARE

## 2023-12-13 ENCOUNTER — PATIENT MESSAGE (OUTPATIENT)
Dept: BARIATRICS | Facility: CLINIC | Age: 58
End: 2023-12-13
Payer: MEDICARE

## 2023-12-18 ENCOUNTER — OFFICE VISIT (OUTPATIENT)
Dept: PODIATRY | Facility: CLINIC | Age: 58
End: 2023-12-18
Payer: MEDICARE

## 2023-12-18 VITALS
HEIGHT: 65 IN | BODY MASS INDEX: 39.99 KG/M2 | DIASTOLIC BLOOD PRESSURE: 79 MMHG | SYSTOLIC BLOOD PRESSURE: 131 MMHG | WEIGHT: 240 LBS | HEART RATE: 80 BPM

## 2023-12-18 DIAGNOSIS — E11.49 TYPE II DIABETES MELLITUS WITH NEUROLOGICAL MANIFESTATIONS: ICD-10-CM

## 2023-12-18 DIAGNOSIS — E11.9 ENCOUNTER FOR DIABETIC FOOT EXAM: Primary | ICD-10-CM

## 2023-12-18 PROCEDURE — 99214 OFFICE O/P EST MOD 30 MIN: CPT | Mod: PBBFAC,PN | Performed by: PODIATRIST

## 2023-12-18 PROCEDURE — 99999 PR PBB SHADOW E&M-EST. PATIENT-LVL IV: ICD-10-PCS | Mod: PBBFAC,,, | Performed by: PODIATRIST

## 2023-12-18 PROCEDURE — 99213 PR OFFICE/OUTPT VISIT, EST, LEVL III, 20-29 MIN: ICD-10-PCS | Mod: S$PBB,,, | Performed by: PODIATRIST

## 2023-12-18 PROCEDURE — 99213 OFFICE O/P EST LOW 20 MIN: CPT | Mod: S$PBB,,, | Performed by: PODIATRIST

## 2023-12-18 PROCEDURE — 99999 PR PBB SHADOW E&M-EST. PATIENT-LVL IV: CPT | Mod: PBBFAC,,, | Performed by: PODIATRIST

## 2023-12-18 NOTE — PROGRESS NOTES
Chief Complaint   Patient presents with    Diabetic Foot Exam     Foot Exam/PCP Clarisse Richardson MD  09/18/23           HPI:   The patient is a 58 y.o.  female  who presents for a diabetic foot exam/care   Patient reports + presence of abnormal sensation to the feet  Patient has no history of wounds on the feet.   Hx of foot surgery: none.     She has calluses on the bottom of the left foot that is painful.  She is using urea.   This patient has documented high risk feet requiring routine maintenance secondary to diabetes.          Primary care doctor is: Clarisse Richardson MD  Patient last saw primary care doctor on:  Diabetic Foot Exam (Foot Exam/PCP Clarisse Richardson MD  09/18/23)           Patient Active Problem List   Diagnosis    Morbid obesity    PADDY (obstructive sleep apnea)    Type 2 diabetes mellitus without complication, without long-term current use of insulin    Nuclear sclerosis - Both Eyes    Hyperlipemia    Proteinuria    Type 2 diabetes mellitus without retinopathy - Both Eyes    Bilateral knee pain    DJD (degenerative joint disease) of knee    Debility    Prosthetic joint implant failure    Failed total knee arthroplasty    Right knee pain    Knee pain    History of total left knee replacement    Lumbago    CTS (carpal tunnel syndrome)    Right carpal tunnel syndrome    Chronic, continuous use of opioids    Mild intermittent asthma without complication    Left arm pain    Left shoulder pain    Allergic reaction to food    DDD (degenerative disc disease), cervical    Cervical radiculopathy    Cervical spondylosis    Cubital tunnel syndrome on right    Bilateral shoulder bursitis    Cervical stenosis of spinal canal    DDD (degenerative disc disease), thoracic    Thoracic spondylosis    Spondylolisthesis at L4-L5 level    Lumbar spondylosis    Spondylolisthesis of cervical region    Cervical spine instability    Myeloradiculopathy    Neural foraminal stenosis of cervical spine    DDD  (degenerative disc disease), lumbar    Idiopathic scoliosis of thoracolumbar spine    Bilateral shoulder region arthritis    Impingement syndrome of right shoulder    Trochanteric bursitis of both hips    Chronic pain    Depression    Recurrent major depressive disorder, in partial remission    GERD (gastroesophageal reflux disease)    Trigger finger    Dyspnea    BMI 45.0-49.9, adult    Sacroiliac joint pain    Spondylosis of lumbosacral region without myelopathy or radiculopathy    Subacromial bursitis of left shoulder joint    Sacroiliitis    Snoring    BMI 39.0-39.9,adult    S/P panniculectomy    Gout    History of sleeve gastrectomy    History of total right knee replacement    Noncompliance with CPAP treatment    Osteoarthritis of lumbar spine    Aortic atherosclerosis    Uterine fibroid    Nuclear sclerotic cataract of left eye    Nuclear sclerotic cataract of right eye    Impaired range of motion of both knees    Decreased strength of lower extremity    Impaired functional mobility, balance, gait, and endurance           Current Outpatient Medications on File Prior to Visit   Medication Sig Dispense Refill    albuterol (VENTOLIN HFA) 90 mcg/actuation inhaler INHALE TWO PUFFS INTO LUNGS EVERY 4 TO 6 HOURS AS NEEDED FOR SHORTNESS OF BREATH AND FOR WHEEZING 18 g 1    allopurinoL (ZYLOPRIM) 300 MG tablet Take 1 tablet (300 mg total) by mouth 2 (two) times daily. 180 tablet 3    atorvastatin (LIPITOR) 20 MG tablet TAKE 1 TABLET (20 MG TOTAL) BY MOUTH ONCE DAILY. 90 tablet 2    b complex vitamins tablet Take 1 tablet by mouth every other day.       calcium citrate/vitamin D2 (ISIAH-CITRATE ORAL) Take 500 mg by mouth 2 (two) times daily.      colchicine, gout, (MITIGARE) 0.6 mg Cap TAKE 1 CAPSULE (0.6 MG TOTAL) BY MOUTH DAILY AS NEEDED (FLARE UP). 90 capsule 1    cyanocobalamin (VITAMIN B-12) 1000 MCG tablet Take 100 mcg by mouth every morning.      diclofenac sodium (VOLTAREN) 1 % Gel Apply 2 g topically 4 (four)  times daily as needed. To painful area on the feet. 500 g 5    FLUoxetine (PROZAC) 20 mg/5 mL (4 mg/mL) solution TAKE 5 MLS BY MOUTH ONCE DAILY. 450 mL 3    fluticasone propionate (FLONASE) 50 mcg/actuation nasal spray 1 SPRAY BY EACH NOSTRIL ROUTE 2 TIMES DAILY AS NEEDED FOR RHINITIS. 48 g 3    indomethacin (INDOCIN) 50 MG capsule TAKE 1 CAPSULE BY MOUTH 2 TIMES DAILY WITH MEALS 30 capsule 2    levoFLOXacin (LEVAQUIN) 500 MG tablet Take 1 tablet (500 mg total) by mouth once daily. 10 tablet 0    LIDOcaine (XYLOCAINE) 5 % Oint ointment APPLY TOPICALLY AS NEEDED. 35.44 g 6    MOUNJARO 2.5 mg/0.5 mL PnIj INJECT 2.5 MG INTO THE SKIN EVERY 7 DAYS. 12 Pen 1    MULTIVIT-IRON-MIN-FOLIC ACID 3,500-18-0.4 UNIT-MG-MG ORAL CHEW Take 1 tablet by mouth 2 (two) times daily.       nystatin (MYCOSTATIN) powder Apply topically 2 (two) times daily. 60 g 3    omeprazole (PRILOSEC) 20 MG capsule TAKE 1 CAPSULE (20 MG TOTAL) BY MOUTH EVERY MORNING. 90 capsule 2    oxybutynin (DITROPAN-XL) 5 MG TR24 TAKE 1 TABLET BY MOUTH ONCE DAILY. 90 tablet 3    oxyCODONE-acetaminophen (PERCOCET)  mg per tablet Take 1 tablet by mouth every 8 (eight) hours as needed for Pain. 90 tablet 0    tiZANidine (ZANAFLEX) 4 MG tablet TAKE 1 TABLET (4 MG TOTAL) BY MOUTH EVERY 8 (EIGHT) HOURS AS NEEDED (MUSCLE PAIN). 90 tablet 2    tretinoin microspheres (RETIN-A MICRO PUMP) 0.08 % GlwP Apply to affected area daily 50 g 0    urea (CARMOL) 40 % Crea Apply topically once daily. To dry skin on the feet. 120 g 5    vitamin D 185 MG Tab Take 5,000 mg by mouth every morning.        Current Facility-Administered Medications on File Prior to Visit   Medication Dose Route Frequency Provider Last Rate Last Admin    0.9%  NaCl infusion   Intravenous Continuous Lina Wagner MD 25 mL/hr at 12/15/20 1506 25 mL/hr at 12/15/20 1506    0.9%  NaCl infusion   Intravenous Continuous Ciro Chung MD           Review of patient's allergies indicates:  No Known  "Allergies                  ROS:  General ROS: negative  Respiratory ROS: no cough, shortness of breath, or wheezing  Cardiovascular ROS: no chest pain or dyspnea on exertion  Musculoskeletal ROS: negative  Neurological ROS:   negative for - gait disturbance, + numbness/tingling, seizures or tremors  Dermatological ROS: + nail changes, dry skin          LAST HbA1c:   Hemoglobin A1C   Date Value Ref Range Status   09/18/2023 6.3 (H) 4.0 - 5.6 % Final     Comment:     ADA Screening Guidelines:  5.7-6.4%  Consistent with prediabetes  >or=6.5%  Consistent with diabetes    High levels of fetal hemoglobin interfere with the HbA1C  assay. Heterozygous hemoglobin variants (HbS, HgC, etc)do  not significantly interfere with this assay.   However, presence of multiple variants may affect accuracy.     03/13/2023 6.3 (H) 4.0 - 5.6 % Final     Comment:     ADA Screening Guidelines:  5.7-6.4%  Consistent with prediabetes  >or=6.5%  Consistent with diabetes    High levels of fetal hemoglobin interfere with the HbA1C  assay. Heterozygous hemoglobin variants (HbS, HgC, etc)do  not significantly interfere with this assay.   However, presence of multiple variants may affect accuracy.     05/09/2022 5.9 (H) 4.0 - 5.6 % Final     Comment:     ADA Screening Guidelines:  5.7-6.4%  Consistent with prediabetes  >or=6.5%  Consistent with diabetes    High levels of fetal hemoglobin interfere with the HbA1C  assay. Heterozygous hemoglobin variants (HbS, HgC, etc)do  not significantly interfere with this assay.   However, presence of multiple variants may affect accuracy.           EXAM:   Vitals:    12/18/23 1527   BP: 131/79   Pulse: 80   Weight: 108.9 kg (240 lb)   Height: 5' 5" (1.651 m)       General: Pt. is well-developed, well-nourished, appears stated age, in no acute distress, alert and oriented x 3.      Vascular: Dorsalis pedis and posterior tibial pulses are 2/4 bilaterally. 3 secs capillary refill time and toes are warm to touch. "    There is reduced hair growth on the feet.    There is 1+ edema.  no varicosities.        Neurological:  Light touch, proprioception, and sharp/dull sensation are all intact bilaterally. Protective threshold with the Corona-Lindsay monofilament is diminished bilaterally.   +paresthesias      Dermatological:  The skin is thin    The toenails  1-5 L and 1-5 R  are greenish- yellow, long by 5-6mm and thickened by 2-3mm with subungual debris  .   There are no open wounds. There is no ecchymoses.  no erythema noted.   Skin diffusely dry and xerotic on the soles, worse on the left.       Interdigital spaces are intact, no fissures    Skin atrophic, thin, dry and mildly hyperpigmented.         Musculoskeletal:    Muscle strength is 5/5 in all groups bilaterally.    Metatarsophalangeal, subtalar, and ankle range of motion are intact without crepitus.     Ankle equinus bilateral         Assessment / Plan:      ICD-10-CM ICD-9-CM    1. Encounter for diabetic foot exam  E11.9 250.00       2. Type II diabetes mellitus with neurological manifestations  E11.49 250.60               I counseled the patient on the patient's conditions, their implications and medical management.  Shoe inspection.     Continue good nutrition and blood sugar control to help prevent podiatric complications of diabetes.   Maintain proper foot hygiene.   Continue wearing proper shoe gear, daily foot inspections, never walking without protective shoe gear, never putting sharp instruments to feet.  Shoe and activity modification as needed for relief.   Continue urea cream daily. Socks to keep moisturize while sleeping.         I spent a total of 20 minutes on the day of the visit.  This includes face to face time and non-face to face time preparing to see the patient (eg, review of tests), obtaining and/or reviewing separately obtained history, documenting clinical information in the electronic or other health record, independently interpreting results  and communicating results to the patient/family/caregiver, or care coordinator.

## 2023-12-18 NOTE — PATIENT INSTRUCTIONS
How to Check Your Feet    Below are tips to help you look for foot problems. Try to check your feet at the same time each day, such as when you get out of bed in the morning.    Check the top of each foot. The tops of toes, back of the heel, and outer edge of the foot can get a lot of rubbing from poor-fitting shoes.    Check the bottom of each foot. Daily wear and tear often leads to problems at pressure spots.    Check the toes and nails. Fungal infections often occur between toes. Toenail problems can also be a sign of fungal infections or lead to breaks in the skin.    Check your shoes, too. Loose objects inside a shoe can injure the foot. Use your hand to feel inside your shoes for things like alie, loose stitching, or rough areas that could irritate your skin.        Diabetic Foot Care    Diabetes can lead to a number of different foot complications. Fortunately, most of these complications can be prevented with a little extra foot care. If diabetes is not well controlled, the high blood sugar can cause damage to blood vessels and result in poor circulation to the foot. When the skin does not get enough blood flow, it becomes prone to pressure sores and ulcers, which heal slowly.  High blood sugar can also damage nerves, interfering with the ability to feel pain and pressure. When you cant feel your foot normally, it is easy to injure your skin, bones and joints without knowing it. For these reasons diabetes increases the risk of fungal infections, bunions and ulcers. Deep ulcers can lead to bone infection. Gangrene is the most serious foot complication of diabetes. It usually occurs on the tips of the toes as blacked areas of skin. The black area is dead tissue. In severe cases, gangrene spreads to involve the entire toe, other toes and the entire foot. Foot or toe amputation may be required. Good foot care and blood sugar control can prevent this.    Home Care  Wear comfortable, proper fitting  shoes.  Wash your feet daily with warm water and mild soap.  After drying, apply a moisturizing cream or lotion.  Check your feet daily for skin breaks, blisters, swelling, or redness. Look between your toes also.  Wear cotton socks and change them every day.  Trim toe nails carefully and do not cut your cuticles.  Strive to keep your blood sugar under control with a combination of medicines, diet and activity.  If you smoke and have diabetes, it is very important that you stop. Smoking reduces blood flow to your foot.  Avoid activities that increase your risk of foot injury:  Do not walk barefoot.  Do not use heating pads or hot water bottles on your feet.  Do not put your foot in a hot tub without first checking the temperature with your hand.  10) Schedule yearly foot exams.    Follow Up  with your doctor or as advised by our staff. Report any cut, puncture, scrape, other injury, blister, ingrown toenail or ulcer on your foot.    Get Prompt Medical Attention  if any of the following occur:  -- Open ulcer with pus draining from the wound  -- Increasing foot or leg pain  -- New areas of redness or swelling or tender areas of the foot    © 1333-5544 [employer]. [address] [place] [address], [place] [address]. All rights reserved. This information is not intended as a substitute for professional medical care. Always follow your healthcare professional's instructions.

## 2024-01-04 ENCOUNTER — HOSPITAL ENCOUNTER (INPATIENT)
Facility: HOSPITAL | Age: 59
LOS: 2 days | Discharge: HOME | DRG: 062 | End: 2024-01-06
Attending: EMERGENCY MEDICINE | Admitting: INTERNAL MEDICINE
Payer: MEDICARE

## 2024-01-04 DIAGNOSIS — I63.511 ACUTE RIGHT MCA STROKE: Primary | ICD-10-CM

## 2024-01-04 DIAGNOSIS — R29.818 ACUTE FOCAL NEUROLOGICAL DEFICIT: ICD-10-CM

## 2024-01-04 DIAGNOSIS — I63.9 STROKE: ICD-10-CM

## 2024-01-04 DIAGNOSIS — G47.33 OSA (OBSTRUCTIVE SLEEP APNEA): Chronic | ICD-10-CM

## 2024-01-04 DIAGNOSIS — J45.20 MILD INTERMITTENT ASTHMA WITHOUT COMPLICATION: Chronic | ICD-10-CM

## 2024-01-04 DIAGNOSIS — F33.41 RECURRENT MAJOR DEPRESSIVE DISORDER, IN PARTIAL REMISSION: ICD-10-CM

## 2024-01-04 DIAGNOSIS — Z92.82 RECEIVED TISSUE PLASMINOGEN ACTIVATOR (T-PA) LESS THAN 24 HOURS PRIOR TO ARRIVAL: ICD-10-CM

## 2024-01-04 DIAGNOSIS — E11.9 TYPE 2 DIABETES MELLITUS WITHOUT COMPLICATION, WITHOUT LONG-TERM CURRENT USE OF INSULIN: Chronic | ICD-10-CM

## 2024-01-04 DIAGNOSIS — Z74.09 IMPAIRED FUNCTIONAL MOBILITY, BALANCE, GAIT, AND ENDURANCE: ICD-10-CM

## 2024-01-04 LAB
ALBUMIN SERPL BCP-MCNC: 3.7 G/DL (ref 3.5–5.2)
ALLENS TEST: ABNORMAL
ALP SERPL-CCNC: 60 U/L (ref 55–135)
ALT SERPL W/O P-5'-P-CCNC: 12 U/L (ref 10–44)
ANION GAP SERPL CALC-SCNC: 10 MMOL/L (ref 8–16)
AST SERPL-CCNC: 18 U/L (ref 10–40)
BASOPHILS # BLD AUTO: 0.03 K/UL (ref 0–0.2)
BASOPHILS NFR BLD: 0.5 % (ref 0–1.9)
BILIRUB SERPL-MCNC: 0.4 MG/DL (ref 0.1–1)
BILIRUB UR QL STRIP: NEGATIVE
BUN SERPL-MCNC: 26 MG/DL (ref 6–20)
BUN SERPL-MCNC: 28 MG/DL (ref 6–30)
CALCIUM SERPL-MCNC: 9.2 MG/DL (ref 8.7–10.5)
CHLORIDE SERPL-SCNC: 106 MMOL/L (ref 95–110)
CHLORIDE SERPL-SCNC: 108 MMOL/L (ref 95–110)
CHOLEST SERPL-MCNC: 225 MG/DL (ref 120–199)
CHOLEST/HDLC SERPL: 3.8 {RATIO} (ref 2–5)
CLARITY UR REFRACT.AUTO: CLEAR
CO2 SERPL-SCNC: 23 MMOL/L (ref 23–29)
COLOR UR AUTO: YELLOW
CREAT SERPL-MCNC: 0.8 MG/DL (ref 0.5–1.4)
CREAT SERPL-MCNC: 0.9 MG/DL (ref 0.5–1.4)
DIFFERENTIAL METHOD BLD: ABNORMAL
EOSINOPHIL # BLD AUTO: 0.2 K/UL (ref 0–0.5)
EOSINOPHIL NFR BLD: 3.5 % (ref 0–8)
ERYTHROCYTE [DISTWIDTH] IN BLOOD BY AUTOMATED COUNT: 13.7 % (ref 11.5–14.5)
EST. GFR  (NO RACE VARIABLE): >60 ML/MIN/1.73 M^2
GLUCOSE SERPL-MCNC: 81 MG/DL (ref 70–110)
GLUCOSE SERPL-MCNC: 88 MG/DL (ref 70–110)
GLUCOSE UR QL STRIP: NEGATIVE
HCO3 UR-SCNC: 24 MMOL/L (ref 24–28)
HCT VFR BLD AUTO: 40.3 % (ref 37–48.5)
HCT VFR BLD CALC: 38 %PCV (ref 36–54)
HCT VFR BLD CALC: 41 %PCV (ref 36–54)
HCV AB SERPL QL IA: NORMAL
HDLC SERPL-MCNC: 60 MG/DL (ref 40–75)
HDLC SERPL: 26.7 % (ref 20–50)
HGB BLD-MCNC: 13.3 G/DL (ref 12–16)
HGB UR QL STRIP: NEGATIVE
HIV 1+2 AB+HIV1 P24 AG SERPL QL IA: NORMAL
IMM GRANULOCYTES # BLD AUTO: 0.01 K/UL (ref 0–0.04)
IMM GRANULOCYTES NFR BLD AUTO: 0.2 % (ref 0–0.5)
INR PPP: 1 (ref 0.8–1.2)
KETONES UR QL STRIP: ABNORMAL
LDLC SERPL CALC-MCNC: 152.6 MG/DL (ref 63–159)
LEUKOCYTE ESTERASE UR QL STRIP: NEGATIVE
LYMPHOCYTES # BLD AUTO: 2 K/UL (ref 1–4.8)
LYMPHOCYTES NFR BLD: 34.2 % (ref 18–48)
MCH RBC QN AUTO: 31.5 PG (ref 27–31)
MCHC RBC AUTO-ENTMCNC: 33 G/DL (ref 32–36)
MCV RBC AUTO: 96 FL (ref 82–98)
MONOCYTES # BLD AUTO: 0.6 K/UL (ref 0.3–1)
MONOCYTES NFR BLD: 10.4 % (ref 4–15)
NEUTROPHILS # BLD AUTO: 3 K/UL (ref 1.8–7.7)
NEUTROPHILS NFR BLD: 51.2 % (ref 38–73)
NITRITE UR QL STRIP: NEGATIVE
NONHDLC SERPL-MCNC: 165 MG/DL
NRBC BLD-RTO: 0 /100 WBC
PCO2 BLDA: 35.1 MMHG (ref 35–45)
PH SMN: 7.44 [PH] (ref 7.35–7.45)
PH UR STRIP: 7 [PH] (ref 5–8)
PLATELET # BLD AUTO: 236 K/UL (ref 150–450)
PMV BLD AUTO: 11.7 FL (ref 9.2–12.9)
PO2 BLDA: 105 MMHG (ref 40–60)
POC BE: 0 MMOL/L
POC IONIZED CALCIUM: 1.09 MMOL/L (ref 1.06–1.42)
POC IONIZED CALCIUM: 1.12 MMOL/L (ref 1.06–1.42)
POC SATURATED O2: 98 % (ref 95–100)
POC TCO2 (MEASURED): 26 MMOL/L (ref 23–29)
POC TCO2: 25 MMOL/L (ref 24–29)
POCT GLUCOSE: 79 MG/DL (ref 70–110)
POCT GLUCOSE: 88 MG/DL (ref 70–110)
POTASSIUM BLD-SCNC: 4.4 MMOL/L (ref 3.5–5.1)
POTASSIUM BLD-SCNC: 4.8 MMOL/L (ref 3.5–5.1)
POTASSIUM SERPL-SCNC: 4.4 MMOL/L (ref 3.5–5.1)
PROT SERPL-MCNC: 7.8 G/DL (ref 6–8.4)
PROT UR QL STRIP: NEGATIVE
PROTHROMBIN TIME: 10.7 SEC (ref 9–12.5)
RBC # BLD AUTO: 4.22 M/UL (ref 4–5.4)
SAMPLE: ABNORMAL
SAMPLE: NORMAL
SITE: ABNORMAL
SODIUM BLD-SCNC: 139 MMOL/L (ref 136–145)
SODIUM BLD-SCNC: 141 MMOL/L (ref 136–145)
SODIUM SERPL-SCNC: 141 MMOL/L (ref 136–145)
SP GR UR STRIP: >1.03 (ref 1–1.03)
TRIGL SERPL-MCNC: 62 MG/DL (ref 30–150)
TROPONIN I SERPL DL<=0.01 NG/ML-MCNC: <0.006 NG/ML (ref 0–0.03)
TSH SERPL DL<=0.005 MIU/L-ACNC: 0.91 UIU/ML (ref 0.4–4)
URN SPEC COLLECT METH UR: ABNORMAL
WBC # BLD AUTO: 5.79 K/UL (ref 3.9–12.7)

## 2024-01-04 PROCEDURE — 82330 ASSAY OF CALCIUM: CPT

## 2024-01-04 PROCEDURE — 82565 ASSAY OF CREATININE: CPT

## 2024-01-04 PROCEDURE — 82962 GLUCOSE BLOOD TEST: CPT

## 2024-01-04 PROCEDURE — 82803 BLOOD GASES ANY COMBINATION: CPT

## 2024-01-04 PROCEDURE — 25500020 PHARM REV CODE 255: Performed by: EMERGENCY MEDICINE

## 2024-01-04 PROCEDURE — 63600175 PHARM REV CODE 636 W HCPCS: Mod: JG | Performed by: PHYSICIAN ASSISTANT

## 2024-01-04 PROCEDURE — 84443 ASSAY THYROID STIM HORMONE: CPT | Performed by: EMERGENCY MEDICINE

## 2024-01-04 PROCEDURE — 85014 HEMATOCRIT: CPT

## 2024-01-04 PROCEDURE — 84132 ASSAY OF SERUM POTASSIUM: CPT

## 2024-01-04 PROCEDURE — 99499 UNLISTED E&M SERVICE: CPT | Mod: ,,,

## 2024-01-04 PROCEDURE — 93005 ELECTROCARDIOGRAM TRACING: CPT

## 2024-01-04 PROCEDURE — 99285 EMERGENCY DEPT VISIT HI MDM: CPT | Mod: 25

## 2024-01-04 PROCEDURE — 84295 ASSAY OF SERUM SODIUM: CPT

## 2024-01-04 PROCEDURE — 82800 BLOOD PH: CPT

## 2024-01-04 PROCEDURE — 25000003 PHARM REV CODE 250: Performed by: PHYSICIAN ASSISTANT

## 2024-01-04 PROCEDURE — 85025 COMPLETE CBC W/AUTO DIFF WBC: CPT | Performed by: EMERGENCY MEDICINE

## 2024-01-04 PROCEDURE — 3E03317 INTRODUCTION OF OTHER THROMBOLYTIC INTO PERIPHERAL VEIN, PERCUTANEOUS APPROACH: ICD-10-PCS | Performed by: PSYCHIATRY & NEUROLOGY

## 2024-01-04 PROCEDURE — 93010 ELECTROCARDIOGRAM REPORT: CPT | Mod: ,,, | Performed by: INTERNAL MEDICINE

## 2024-01-04 PROCEDURE — 84484 ASSAY OF TROPONIN QUANT: CPT

## 2024-01-04 PROCEDURE — 81003 URINALYSIS AUTO W/O SCOPE: CPT

## 2024-01-04 PROCEDURE — 80061 LIPID PANEL: CPT | Performed by: EMERGENCY MEDICINE

## 2024-01-04 PROCEDURE — 99900035 HC TECH TIME PER 15 MIN (STAT)

## 2024-01-04 PROCEDURE — A4216 STERILE WATER/SALINE, 10 ML: HCPCS | Performed by: PHYSICIAN ASSISTANT

## 2024-01-04 PROCEDURE — 86803 HEPATITIS C AB TEST: CPT | Performed by: PHYSICIAN ASSISTANT

## 2024-01-04 PROCEDURE — 25000003 PHARM REV CODE 250

## 2024-01-04 PROCEDURE — 87389 HIV-1 AG W/HIV-1&-2 AB AG IA: CPT | Performed by: PHYSICIAN ASSISTANT

## 2024-01-04 PROCEDURE — 99223 1ST HOSP IP/OBS HIGH 75: CPT | Mod: ,,, | Performed by: PSYCHIATRY & NEUROLOGY

## 2024-01-04 PROCEDURE — 99291 CRITICAL CARE FIRST HOUR: CPT | Mod: FS,,, | Performed by: INTERNAL MEDICINE

## 2024-01-04 PROCEDURE — 20000000 HC ICU ROOM

## 2024-01-04 PROCEDURE — 80053 COMPREHEN METABOLIC PANEL: CPT | Performed by: EMERGENCY MEDICINE

## 2024-01-04 PROCEDURE — 85610 PROTHROMBIN TIME: CPT | Performed by: EMERGENCY MEDICINE

## 2024-01-04 RX ORDER — FAMOTIDINE 20 MG/1
20 TABLET, FILM COATED ORAL 2 TIMES DAILY
Status: DISCONTINUED | OUTPATIENT
Start: 2024-01-04 | End: 2024-01-06 | Stop reason: HOSPADM

## 2024-01-04 RX ORDER — AMOXICILLIN 250 MG
1 CAPSULE ORAL DAILY
Status: DISCONTINUED | OUTPATIENT
Start: 2024-01-05 | End: 2024-01-06 | Stop reason: HOSPADM

## 2024-01-04 RX ORDER — LABETALOL HCL 20 MG/4 ML
10 SYRINGE (ML) INTRAVENOUS EVERY 4 HOURS PRN
Status: DISCONTINUED | OUTPATIENT
Start: 2024-01-04 | End: 2024-01-06 | Stop reason: HOSPADM

## 2024-01-04 RX ORDER — CYANOCOBALAMIN (VITAMIN B-12) 250 MCG
250 TABLET ORAL DAILY
Status: DISCONTINUED | OUTPATIENT
Start: 2024-01-05 | End: 2024-01-06 | Stop reason: HOSPADM

## 2024-01-04 RX ORDER — ALBUTEROL SULFATE 90 UG/1
2 AEROSOL, METERED RESPIRATORY (INHALATION) EVERY 6 HOURS PRN
Status: DISCONTINUED | OUTPATIENT
Start: 2024-01-04 | End: 2024-01-06 | Stop reason: HOSPADM

## 2024-01-04 RX ORDER — SODIUM CHLORIDE 0.9 % (FLUSH) 0.9 %
10 SYRINGE (ML) INJECTION ONCE
Status: COMPLETED | OUTPATIENT
Start: 2024-01-04 | End: 2024-01-04

## 2024-01-04 RX ORDER — HYDRALAZINE HYDROCHLORIDE 20 MG/ML
10 INJECTION INTRAMUSCULAR; INTRAVENOUS EVERY 4 HOURS PRN
Status: DISCONTINUED | OUTPATIENT
Start: 2024-01-04 | End: 2024-01-06 | Stop reason: HOSPADM

## 2024-01-04 RX ORDER — GLUCAGON 1 MG
1 KIT INJECTION
Status: DISCONTINUED | OUTPATIENT
Start: 2024-01-04 | End: 2024-01-06 | Stop reason: HOSPADM

## 2024-01-04 RX ORDER — SODIUM CHLORIDE 0.9 % (FLUSH) 0.9 %
10 SYRINGE (ML) INJECTION
Status: DISCONTINUED | OUTPATIENT
Start: 2024-01-04 | End: 2024-01-06 | Stop reason: HOSPADM

## 2024-01-04 RX ORDER — FLUOXETINE HYDROCHLORIDE 20 MG/1
20 CAPSULE ORAL DAILY
Status: DISCONTINUED | OUTPATIENT
Start: 2024-01-05 | End: 2024-01-06 | Stop reason: HOSPADM

## 2024-01-04 RX ORDER — ALLOPURINOL 100 MG/1
300 TABLET ORAL EVERY 12 HOURS
Status: DISCONTINUED | OUTPATIENT
Start: 2024-01-05 | End: 2024-01-06 | Stop reason: HOSPADM

## 2024-01-04 RX ORDER — LANOLIN ALCOHOL/MO/W.PET/CERES
800 CREAM (GRAM) TOPICAL
Status: DISCONTINUED | OUTPATIENT
Start: 2024-01-04 | End: 2024-01-05

## 2024-01-04 RX ORDER — SODIUM,POTASSIUM PHOSPHATES 280-250MG
2 POWDER IN PACKET (EA) ORAL
Status: DISCONTINUED | OUTPATIENT
Start: 2024-01-04 | End: 2024-01-05

## 2024-01-04 RX ORDER — INSULIN ASPART 100 [IU]/ML
0-10 INJECTION, SOLUTION INTRAVENOUS; SUBCUTANEOUS
Status: DISCONTINUED | OUTPATIENT
Start: 2024-01-04 | End: 2024-01-06 | Stop reason: HOSPADM

## 2024-01-04 RX ORDER — IBUPROFEN 200 MG
24 TABLET ORAL
Status: DISCONTINUED | OUTPATIENT
Start: 2024-01-04 | End: 2024-01-06 | Stop reason: HOSPADM

## 2024-01-04 RX ORDER — IBUPROFEN 200 MG
16 TABLET ORAL
Status: DISCONTINUED | OUTPATIENT
Start: 2024-01-04 | End: 2024-01-06 | Stop reason: HOSPADM

## 2024-01-04 RX ORDER — ATORVASTATIN CALCIUM 40 MG/1
40 TABLET, FILM COATED ORAL DAILY
Status: DISCONTINUED | OUTPATIENT
Start: 2024-01-05 | End: 2024-01-06

## 2024-01-04 RX ORDER — ACETAMINOPHEN 325 MG/1
650 TABLET ORAL EVERY 6 HOURS PRN
Status: DISCONTINUED | OUTPATIENT
Start: 2024-01-04 | End: 2024-01-06 | Stop reason: HOSPADM

## 2024-01-04 RX ADMIN — FAMOTIDINE 20 MG: 20 TABLET, FILM COATED ORAL at 10:01

## 2024-01-04 RX ADMIN — IOHEXOL 100 ML: 350 INJECTION, SOLUTION INTRAVENOUS at 08:01

## 2024-01-04 RX ADMIN — Medication 25 MG: at 08:01

## 2024-01-04 RX ADMIN — Medication 10 ML: at 08:01

## 2024-01-05 LAB
ALBUMIN SERPL BCP-MCNC: 3.3 G/DL (ref 3.5–5.2)
ALP SERPL-CCNC: 53 U/L (ref 55–135)
ALT SERPL W/O P-5'-P-CCNC: 10 U/L (ref 10–44)
ANION GAP SERPL CALC-SCNC: 11 MMOL/L (ref 8–16)
ASCENDING AORTA: 3.36 CM
AST SERPL-CCNC: 15 U/L (ref 10–40)
AV INDEX (PROSTH): 0.75
AV MEAN GRADIENT: 5 MMHG
AV PEAK GRADIENT: 9 MMHG
AV VALVE AREA BY VELOCITY RATIO: 2.44 CM²
AV VALVE AREA: 2.48 CM²
AV VELOCITY RATIO: 0.74
BASOPHILS # BLD AUTO: 0.03 K/UL (ref 0–0.2)
BASOPHILS NFR BLD: 0.5 % (ref 0–1.9)
BILIRUB SERPL-MCNC: 0.5 MG/DL (ref 0.1–1)
BSA FOR ECHO PROCEDURE: 2.23 M2
BUN SERPL-MCNC: 20 MG/DL (ref 6–20)
CALCIUM SERPL-MCNC: 8.7 MG/DL (ref 8.7–10.5)
CHLORIDE SERPL-SCNC: 108 MMOL/L (ref 95–110)
CO2 SERPL-SCNC: 22 MMOL/L (ref 23–29)
CREAT SERPL-MCNC: 0.6 MG/DL (ref 0.5–1.4)
CV ECHO LV RWT: 0.23 CM
DIFFERENTIAL METHOD BLD: ABNORMAL
DOP CALC AO PEAK VEL: 1.53 M/S
DOP CALC AO VTI: 31.58 CM
DOP CALC LVOT AREA: 3.3 CM2
DOP CALC LVOT DIAMETER: 2.05 CM
DOP CALC LVOT PEAK VEL: 1.13 M/S
DOP CALC LVOT STROKE VOLUME: 78.28 CM3
DOP CALCLVOT PEAK VEL VTI: 23.73 CM
E WAVE DECELERATION TIME: 275.7 MSEC
E/A RATIO: 0.89
E/E' RATIO: 4.96 M/S
ECHO LV POSTERIOR WALL: 0.61 CM (ref 0.6–1.1)
EJECTION FRACTION: 63 %
EOSINOPHIL # BLD AUTO: 0.2 K/UL (ref 0–0.5)
EOSINOPHIL NFR BLD: 3.4 % (ref 0–8)
ERYTHROCYTE [DISTWIDTH] IN BLOOD BY AUTOMATED COUNT: 13.6 % (ref 11.5–14.5)
EST. GFR  (NO RACE VARIABLE): >60 ML/MIN/1.73 M^2
ESTIMATED AVG GLUCOSE: 131 MG/DL (ref 68–131)
FRACTIONAL SHORTENING: 37 % (ref 28–44)
GLUCOSE SERPL-MCNC: 81 MG/DL (ref 70–110)
HBA1C MFR BLD: 6.2 % (ref 4–5.6)
HCT VFR BLD AUTO: 35.8 % (ref 37–48.5)
HGB BLD-MCNC: 11.9 G/DL (ref 12–16)
IMM GRANULOCYTES # BLD AUTO: 0.01 K/UL (ref 0–0.04)
IMM GRANULOCYTES NFR BLD AUTO: 0.2 % (ref 0–0.5)
INTERVENTRICULAR SEPTUM: 0.66 CM (ref 0.6–1.1)
IVRT: 98.95 MSEC
LA MAJOR: 6.92 CM
LA MINOR: 6.74 CM
LA WIDTH: 3.82 CM
LEFT ATRIUM SIZE: 3.37 CM
LEFT ATRIUM VOLUME INDEX MOD: 36.8 ML/M2
LEFT ATRIUM VOLUME INDEX: 34.9 ML/M2
LEFT ATRIUM VOLUME MOD: 78.72 CM3
LEFT ATRIUM VOLUME: 74.72 CM3
LEFT INTERNAL DIMENSION IN SYSTOLE: 3.31 CM (ref 2.1–4)
LEFT VENTRICLE DIASTOLIC VOLUME INDEX: 61.9 ML/M2
LEFT VENTRICLE DIASTOLIC VOLUME: 132.46 ML
LEFT VENTRICLE MASS INDEX: 52 G/M2
LEFT VENTRICLE SYSTOLIC VOLUME INDEX: 20.8 ML/M2
LEFT VENTRICLE SYSTOLIC VOLUME: 44.44 ML
LEFT VENTRICULAR INTERNAL DIMENSION IN DIASTOLE: 5.25 CM (ref 3.5–6)
LEFT VENTRICULAR MASS: 110.81 G
LV LATERAL E/E' RATIO: 4.38 M/S
LV SEPTAL E/E' RATIO: 5.7 M/S
LYMPHOCYTES # BLD AUTO: 2.3 K/UL (ref 1–4.8)
LYMPHOCYTES NFR BLD: 39.1 % (ref 18–48)
MAGNESIUM SERPL-MCNC: 2 MG/DL (ref 1.6–2.6)
MCH RBC QN AUTO: 31.6 PG (ref 27–31)
MCHC RBC AUTO-ENTMCNC: 33.2 G/DL (ref 32–36)
MCV RBC AUTO: 95 FL (ref 82–98)
MONOCYTES # BLD AUTO: 0.6 K/UL (ref 0.3–1)
MONOCYTES NFR BLD: 10.6 % (ref 4–15)
MV PEAK A VEL: 0.64 M/S
MV PEAK E VEL: 0.57 M/S
NEUTROPHILS # BLD AUTO: 2.8 K/UL (ref 1.8–7.7)
NEUTROPHILS NFR BLD: 46.2 % (ref 38–73)
NRBC BLD-RTO: 0 /100 WBC
PHOSPHATE SERPL-MCNC: 3.8 MG/DL (ref 2.7–4.5)
PISA TR MAX VEL: 2.68 M/S
PLATELET # BLD AUTO: 213 K/UL (ref 150–450)
PMV BLD AUTO: 11 FL (ref 9.2–12.9)
POCT GLUCOSE: 102 MG/DL (ref 70–110)
POCT GLUCOSE: 137 MG/DL (ref 70–110)
POCT GLUCOSE: 174 MG/DL (ref 70–110)
POCT GLUCOSE: 205 MG/DL (ref 70–110)
POCT GLUCOSE: 87 MG/DL (ref 70–110)
POTASSIUM SERPL-SCNC: 4 MMOL/L (ref 3.5–5.1)
PROT SERPL-MCNC: 6.9 G/DL (ref 6–8.4)
RA MAJOR: 5.47 CM
RA PRESSURE ESTIMATED: 3 MMHG
RA WIDTH: 3.91 CM
RBC # BLD AUTO: 3.77 M/UL (ref 4–5.4)
RIGHT VENTRICULAR END-DIASTOLIC DIMENSION: 2.96 CM
RV TB RVSP: 6 MMHG
SINUS: 3.3 CM
SODIUM SERPL-SCNC: 141 MMOL/L (ref 136–145)
STJ: 2.97 CM
TDI LATERAL: 0.13 M/S
TDI SEPTAL: 0.1 M/S
TDI: 0.12 M/S
TR MAX PG: 29 MMHG
TRICUSPID ANNULAR PLANE SYSTOLIC EXCURSION: 2.38 CM
TV REST PULMONARY ARTERY PRESSURE: 32 MMHG
WBC # BLD AUTO: 5.94 K/UL (ref 3.9–12.7)
Z-SCORE OF LEFT VENTRICULAR DIMENSION IN END DIASTOLE: -2.71
Z-SCORE OF LEFT VENTRICULAR DIMENSION IN END SYSTOLE: -1.87

## 2024-01-05 PROCEDURE — 94761 N-INVAS EAR/PLS OXIMETRY MLT: CPT

## 2024-01-05 PROCEDURE — 99233 SBSQ HOSP IP/OBS HIGH 50: CPT | Mod: GC,,, | Performed by: PSYCHIATRY & NEUROLOGY

## 2024-01-05 PROCEDURE — 25000003 PHARM REV CODE 250: Performed by: NURSE PRACTITIONER

## 2024-01-05 PROCEDURE — 97530 THERAPEUTIC ACTIVITIES: CPT

## 2024-01-05 PROCEDURE — 84100 ASSAY OF PHOSPHORUS: CPT

## 2024-01-05 PROCEDURE — 25000003 PHARM REV CODE 250

## 2024-01-05 PROCEDURE — 20000000 HC ICU ROOM

## 2024-01-05 PROCEDURE — 63600175 PHARM REV CODE 636 W HCPCS: Performed by: NURSE PRACTITIONER

## 2024-01-05 PROCEDURE — 85025 COMPLETE CBC W/AUTO DIFF WBC: CPT

## 2024-01-05 PROCEDURE — 80053 COMPREHEN METABOLIC PANEL: CPT

## 2024-01-05 PROCEDURE — 83735 ASSAY OF MAGNESIUM: CPT

## 2024-01-05 PROCEDURE — 99233 SBSQ HOSP IP/OBS HIGH 50: CPT | Mod: ,,, | Performed by: PSYCHIATRY & NEUROLOGY

## 2024-01-05 PROCEDURE — 25000003 PHARM REV CODE 250: Performed by: PSYCHIATRY & NEUROLOGY

## 2024-01-05 PROCEDURE — 83036 HEMOGLOBIN GLYCOSYLATED A1C: CPT

## 2024-01-05 PROCEDURE — 97165 OT EVAL LOW COMPLEX 30 MIN: CPT

## 2024-01-05 RX ORDER — GUAIFENESIN 100 MG/5ML
81 LIQUID (ML) ORAL DAILY
Status: DISCONTINUED | OUTPATIENT
Start: 2024-01-05 | End: 2024-01-06 | Stop reason: HOSPADM

## 2024-01-05 RX ORDER — HEPARIN SODIUM 5000 [USP'U]/ML
5000 INJECTION, SOLUTION INTRAVENOUS; SUBCUTANEOUS EVERY 8 HOURS
Status: DISCONTINUED | OUTPATIENT
Start: 2024-01-05 | End: 2024-01-06 | Stop reason: HOSPADM

## 2024-01-05 RX ORDER — ALBUTEROL SULFATE 90 UG/1
2 AEROSOL, METERED RESPIRATORY (INHALATION) EVERY 6 HOURS PRN
Status: DISCONTINUED | OUTPATIENT
Start: 2024-01-05 | End: 2024-01-05

## 2024-01-05 RX ORDER — DICLOFENAC SODIUM 10 MG/G
2 GEL TOPICAL 4 TIMES DAILY PRN
Status: DISCONTINUED | OUTPATIENT
Start: 2024-01-05 | End: 2024-01-06 | Stop reason: HOSPADM

## 2024-01-05 RX ORDER — OXYBUTYNIN CHLORIDE 5 MG/1
5 TABLET, EXTENDED RELEASE ORAL DAILY
Status: DISCONTINUED | OUTPATIENT
Start: 2024-01-05 | End: 2024-01-06 | Stop reason: HOSPADM

## 2024-01-05 RX ORDER — ALLOPURINOL 100 MG/1
300 TABLET ORAL 2 TIMES DAILY
Status: DISCONTINUED | OUTPATIENT
Start: 2024-01-05 | End: 2024-01-05

## 2024-01-05 RX ADMIN — FAMOTIDINE 20 MG: 20 TABLET, FILM COATED ORAL at 09:01

## 2024-01-05 RX ADMIN — ALLOPURINOL 300 MG: 100 TABLET ORAL at 09:01

## 2024-01-05 RX ADMIN — ACETAMINOPHEN 650 MG: 325 TABLET ORAL at 12:01

## 2024-01-05 RX ADMIN — FLUOXETINE 20 MG: 20 CAPSULE ORAL at 08:01

## 2024-01-05 RX ADMIN — ACETAMINOPHEN 650 MG: 325 TABLET ORAL at 09:01

## 2024-01-05 RX ADMIN — ASPIRIN 81 MG CHEWABLE TABLET 81 MG: 81 TABLET CHEWABLE at 09:01

## 2024-01-05 RX ADMIN — HEPARIN SODIUM 5000 UNITS: 5000 INJECTION INTRAVENOUS; SUBCUTANEOUS at 09:01

## 2024-01-05 RX ADMIN — ACETAMINOPHEN 650 MG: 325 TABLET ORAL at 06:01

## 2024-01-05 RX ADMIN — FAMOTIDINE 20 MG: 20 TABLET, FILM COATED ORAL at 08:01

## 2024-01-05 RX ADMIN — ALLOPURINOL 300 MG: 100 TABLET ORAL at 08:01

## 2024-01-05 RX ADMIN — ATORVASTATIN CALCIUM 40 MG: 40 TABLET, FILM COATED ORAL at 08:01

## 2024-01-05 RX ADMIN — CYANOCOBALAMIN TAB 250 MCG 250 MCG: 250 TAB at 08:01

## 2024-01-05 RX ADMIN — SENNOSIDES AND DOCUSATE SODIUM 1 TABLET: 8.6; 5 TABLET ORAL at 08:01

## 2024-01-05 RX ADMIN — ACETAMINOPHEN 650 MG: 325 TABLET ORAL at 02:01

## 2024-01-05 RX ADMIN — OXYBUTYNIN CHLORIDE 5 MG: 5 TABLET, EXTENDED RELEASE ORAL at 09:01

## 2024-01-05 NOTE — SUBJECTIVE & OBJECTIVE
Past Medical History:   Diagnosis Date    Allergy     Anemia     Asthma     Bilateral shoulder bursitis     Cervical stenosis of spine     Chronic pain     DDD (degenerative disc disease), cervical     Depression 01/28/2019    Diabetes mellitus type II     DJD (degenerative joint disease) of knee 06/19/2014    Facet arthritis of lumbar region 12/17/2015    Facet syndrome 12/17/2015    GERD (gastroesophageal reflux disease)     Heartburn     Hyperlipidemia     Hypertension     Morbid obesity     Neuromuscular disorder     Nuclear sclerotic cataract of left eye 8/8/2023    PADDY (obstructive sleep apnea)     Proteinuria     Right carpal tunnel syndrome     Sacroiliitis 06/13/2018    Sacroiliitis     Sleep apnea     Uterine fibroid      Past Surgical History:   Procedure Laterality Date    CARPAL TUNNEL RELEASE      CARPAL TUNNEL RELEASE  1980s    left    CARPAL TUNNEL RELEASE  2012    right    CATARACT EXTRACTION W/  INTRAOCULAR LENS IMPLANT Left 8/8/2023    Procedure: EXTRACTION, CATARACT, WITH IOL INSERTION;  Surgeon: Justin Block MD;  Location: [facility] OR;  Service: Ophthalmology;  Laterality: Left;    CATARACT EXTRACTION W/  INTRAOCULAR LENS IMPLANT Right 8/29/2023    Procedure: EXTRACTION, CATARACT, WITH IOL INSERTION;  Surgeon: Justin Block MD;  Location: [facility] OR;  Service: Ophthalmology;  Laterality: Right;    COLONOSCOPY N/A 6/15/2020    Procedure: COLONOSCOPY;  Surgeon: Jc Koo MD;  [facility]  Service: Endoscopy;  Laterality: N/A;  COVID screening on 6/13/20 at [facility]  pt updated on drop off location and no visitor policy-    EPIDURAL STEROID INJECTION N/A 7/24/2023    Procedure: INJECTION, STEROID, EPIDURAL, L5/S1 SOONER DATE;  Surgeon: Israel Hurtado MD;  Location: [facility] PAIN MGT;  Service: Pain Management;  Laterality: N/A;    ESOPHAGOGASTRODUODENOSCOPY N/A 3/27/2019    Procedure: EGD (ESOPHAGOGASTRODUODENOSCOPY);  Surgeon: Robbin Bar MD;   Location: [facility] ENDO (2ND FLR);  Service: Endoscopy;  Laterality: N/A;  BMI 61.3/2nd floor case/svn    INJECTION OF JOINT Bilateral 6/9/2020    Procedure: INJECTION, JOINT, BILATERAL SI;  Surgeon: Lina Wagner MD;  Location: [facility] PAIN MGT;  Service: Pain Management;  Laterality: Bilateral;  B/L SI Joint Injection    INJECTION OF JOINT Bilateral 5/11/2021    Procedure: INJECTION, JOINT, SACROILIAC (SI) need consent;  Surgeon: Lina Wagner MD;  Location: [facility] PAIN MGT;  Service: Pain Management;  Laterality: Bilateral;    JOINT REPLACEMENT Bilateral     with 2 revisions on rt    KNEE SURGERY  3/2010    orthroscope    KNEE SURGERY  6-19-14    left TKR    LAPAROSCOPIC SLEEVE GASTRECTOMY N/A 8/28/2019    Procedure: GASTRECTOMY, SLEEVE, LAPAROSCOPIC with intraop EGD;  Surgeon: Heriberto Clements MD;  [facility];  Service: General;  Laterality: N/A;    PANNICULECTOMY N/A 6/14/2022    Procedure: PANNICULECTOMY;  Surgeon: Rhett Gray MD;  [facility];  Service: Plastics;  Laterality: N/A;    RADIOFREQUENCY ABLATION Right 7/9/2019    Procedure: RADIOFREQUENCY ABLATION;  Surgeon: Lina Wagner MD;  Location: [facility] PAIN MGT;  Service: Pain Management;  Laterality: Right;  RIGHT RFA L2,3,4,5  2 of 2    RADIOFREQUENCY ABLATION Left 12/19/2019    Procedure: RADIOFREQUENCY ABLATION, LEFT L2-L3-L4-L5 MEDIAL BRANCH 1 OF 2;  Surgeon: Lina Wagner MD;  Location: [facility] PAIN MGT;  Service: Pain Management;  Laterality: Left;    RADIOFREQUENCY ABLATION Right 12/31/2019    Procedure: RADIOFREQUENCY ABLATION, RIGHT L2-L3-L4-L5  2 OF 2;  Surgeon: Lina Wagner MD;  Location: [facility] PAIN MGT;  Service: Pain Management;  Laterality: Right;    RADIOFREQUENCY ABLATION Right 10/13/2020    Procedure: RADIOFREQUENCY ABLATION, Genicular Cooled 1 of 2;  Surgeon: Lina Wagner MD;  Location: [facility] PAIN MGT;  Service: Pain Management;  Laterality: Right;    RADIOFREQUENCY ABLATION  Left 11/3/2020    Procedure: RADIOFREQUENCY ABLATION, GENICULAR COOLED  2 OF 2;  Surgeon: Lina Wagner MD;  Location: [facility] PAIN MGT;  Service: Pain Management;  Laterality: Left;    RADIOFREQUENCY ABLATION Left 12/15/2020    Procedure: RADIOFREQUENCY ABLATION LEFT L2,3,4,5 1 of 2;  Surgeon: Lina Wagner MD;  Location: [facility] PAIN MGT;  Service: Pain Management;  Laterality: Left;    RADIOFREQUENCY ABLATION Right 12/29/2020    Procedure: RADIOFREQUENCY ABLATION RIGHT L2,3,4,5 2 of 2;  Surgeon: Lina Wagner MD;  Location: [facility] PAIN MGT;  Service: Pain Management;  Laterality: Right;    RADIOFREQUENCY ABLATION Left 6/29/2021    Procedure: RADIOFREQUENCY ABLATION GENICULAR COOLED;  Surgeon: Lina Wagner MD;  Location: [facility] PAIN MGT;  Service: Pain Management;  Laterality: Left;  1 of 2    RADIOFREQUENCY ABLATION Right 7/13/2021    Procedure: RADIOFREQUENCY ABLATION GENICULAR;  Surgeon: Lina Wagner MD;  Location: [facility] PAIN MGT;  Service: Pain Management;  Laterality: Right;  2 of 2    RADIOFREQUENCY ABLATION Right 8/24/2021    Procedure: RADIOFREQUENCY ABLATION, L2-L3-L4-L5 MEDIAL BRANCH 1 OF 2;  Surgeon: Lina Wagner MD;  Location: [facility] PAIN MGT;  Service: Pain Management;  Laterality: Right;    RADIOFREQUENCY ABLATION Left 9/11/2021    Procedure: RADIOFREQUENCY ABLATION, L2-L3-L4-L5 MEDIAL BR ANCH 2 OF 2;  Surgeon: Lina Wagner MD;  Location: [facility] PAIN MGT;  Service: Pain Management;  Laterality: Left;    RADIOFREQUENCY ABLATION Right 3/7/2022    Procedure: RADIOFREQUENCY ABLATION RIGHT L3,4,5 1 of 2, needs consent;  Surgeon: Israel Hurtado MD;  Location: [facility] PAIN MGT;  Service: Pain Management;  Laterality: Right;    RADIOFREQUENCY ABLATION Left 3/21/2022    Procedure: RADIOFREQUENCY ABLATION RIGHT L3,4,5 2 of 2, needs consent;  Surgeon: Israel Hurtado MD;  Location: [facility] PAIN MGT;  Service: Pain Management;  Laterality: Left;    RADIOFREQUENCY ABLATION Right 5/9/2022     Procedure: RADIOFREQUENCY ABLATION RIGHT GENICULAR COOLED 1 of 2;  Surgeon: Israel Hurtado MD;  Location: [facility] PAIN MGT;  Service: Pain Management;  Laterality: Right;    RADIOFREQUENCY ABLATION Left 5/23/2022    Procedure: RADIOFREQUENCY ABLATION LEFT GENICULAR COOLED  2 of 2;  Surgeon: Israel Hurtado MD;  Location: [facility] PAIN MGT;  Service: Pain Management;  Laterality: Left;    RADIOFREQUENCY ABLATION Right 12/12/2022    Procedure: RADIOFREQUENCY ABLATION RIGHT GENICULAR COOLED  ONE OF TWO  NEEDS CONSENT;  Surgeon: Israel Hurtado MD;  Location: [facility] PAIN MGT;  Service: Pain Management;  Laterality: Right;    RADIOFREQUENCY ABLATION Left 1/9/2023    Procedure: RADIOFREQUENCY ABLATION LEFT GENICULAR COOLED  TWO OF TWO NEEDS CONSENT;  Surgeon: Israel Hurtado MD;  Location: [facility] PAIN MGT;  Service: Pain Management;  Laterality: Left;    RADIOFREQUENCY ABLATION Right 3/6/2023    Procedure: RADIOFREQUENCY ABLATION RIGHT L3,L4,L5;  Surgeon: Israel Hurtado MD;  Location: [facility] PAIN MGT;  Service: Pain Management;  Laterality: Right;    RADIOFREQUENCY ABLATION Left 5/22/2023    Procedure: RADIOFREQUENCY ABLATION LEFT L3,L4,L5;  Surgeon: Israel Hurtado MD;  Location: [facility] PAIN MGT;  Service: Pain Management;  Laterality: Left;  4/3 LM TO R/S    RADIOFREQUENCY ABLATION Right 11/27/2023    Procedure: RADIOFREQUENCY ABLATION RIGHT L3, 4, 5 1 OF 3;  Surgeon: Israel Hurtado MD;  Location: [facility] PAIN MGT;  Service: Pain Management;  Laterality: Right;    RADIOFREQUENCY ABLATION OF LUMBAR MEDIAL BRANCH NERVE AT SINGLE LEVEL Left 6/26/2018    Procedure: RADIOFREQUENCY ABLATION, NERVE, MEDIAL BRANCH, LUMBAR, 1 LEVEL;  Surgeon: Lina Wagner MD;  Location: [facility] PAIN MGT;  Service: Pain Management;  Laterality: Left;  Left Cooled RFA @ L2,3,4,5  [id number]  with Sedation    1 of 2    RADIOFREQUENCY ABLATION OF LUMBAR MEDIAL BRANCH NERVE AT SINGLE LEVEL Right 7/10/2018    Procedure: RADIOFREQUENCY ABLATION, NERVE, MEDIAL BRANCH, LUMBAR, 1  LEVEL;  Surgeon: Lina Wagner MD;  Location: [facility] PAIN MGT;  Service: Pain Management;  Laterality: Right;  RIght Cooled RFA @ L2,3,4,5  [id number] with Sedation    2 of 2    TRIGGER FINGER RELEASE Right 2017    TRIGGER FINGER RELEASE Left 7/29/2019    Procedure: RELEASE, TRIGGER FINGER, Left Thumb;  Surgeon: Velma Garcia MD;  Location: [facility] OR;  Service: Orthopedics;  Laterality: Left;  Local w/ MAC     Social History     Tobacco Use    Smoking status: Never    Smokeless tobacco: Never   Substance Use Topics    Alcohol use: Not Currently     Comment: occasionally     Drug use: No     Review of patient's allergies indicates:   Allergen Reactions    Strawberry Anaphylaxis       Medications: I have reviewed the current medication administration record.    (Not in a hospital admission)      Review of Systems   Constitutional:  Negative for fatigue.   HENT:  Negative for drooling and trouble swallowing.    Eyes:  Negative for visual disturbance.   Respiratory:  Negative for choking.    Cardiovascular:  Negative for palpitations.   Gastrointestinal:  Negative for nausea and vomiting.   Musculoskeletal:  Positive for neck pain. Negative for neck stiffness.   Skin:  Negative for color change.   Neurological:  Positive for speech difficulty, weakness, numbness and headaches.   Psychiatric/Behavioral:  Negative for confusion.    All other systems reviewed and are negative.    Objective:     Vital Signs (Most Recent):  Temp: 98.2 °F (36.8 °C) (01/04/24 1954)  Pulse: 67 (01/04/24 1954)  Resp: 20 (01/04/24 1954)  BP: (!) 156/85 (01/04/24 1954)  SpO2: 98 % (01/04/24 1954)    Vital Signs Range (Last 24H):  Temp:  [98.2 °F (36.8 °C)]   Pulse:  [67]   Resp:  [20]   BP: (156)/(85)   SpO2:  [98 %]        Physical Exam  Vitals and nursing note reviewed.   Constitutional:       General: She is not in acute distress.  HENT:      Head: Normocephalic.      Nose: Nose normal.      Mouth/Throat:      Pharynx: No  "oropharyngeal exudate or posterior oropharyngeal erythema.   Eyes:      Extraocular Movements: Extraocular movements intact.      Conjunctiva/sclera: Conjunctivae normal.   Cardiovascular:      Rate and Rhythm: Normal rate.   Pulmonary:      Effort: Pulmonary effort is normal. No respiratory distress.   Abdominal:      General: There is no distension.   Musculoskeletal:         General: No deformity or signs of injury.      Cervical back: Normal range of motion. No rigidity.   Skin:     General: Skin is warm and dry.   Neurological:      Mental Status: She is alert and oriented to person, place, and time.   Psychiatric:         Attention and Perception: Attention normal.         Behavior: Behavior normal. Behavior is cooperative.              Neurological Exam:   LOC: alert  Attention Span: Good   Language: No aphasia  Articulation: Dysarthria  Orientation: Person, Place, Time   Visual Fields: Full  EOM (CN III, IV, VI): Full/intact  Facial Sensation (CN V): Facial sensory loss  Facial Movement (CN VII): Lower facial weakness on the Left  Motor: Arm left  Paresis: 4/5  Leg left  Paresis: 4/5  Arm right  Normal 5/5  Leg right Normal 5/5  Sensation: Intact to light touch      Laboratory:  CMP: No results for input(s): "GLUCOSE", "CALCIUM", "ALBUMIN", "PROT", "NA", "K", "CO2", "CL", "BUN", "CREATININE", "ALKPHOS", "ALT", "AST", "BILITOT" in the last 24 hours.  CBC:   Recent Labs   Lab 01/04/24 2031   WBC 5.79   RBC 4.22   HGB 13.3   HCT 40.3      MCV 96   MCH 31.5*   MCHC 33.0     Lipid Panel: No results for input(s): "CHOL", "LDLCALC", "HDL", "TRIG" in the last 168 hours.  Coagulation: No results for input(s): "PT", "INR", "APTT" in the last 168 hours.  Hgb A1C: No results for input(s): "HGBA1C" in the last 168 hours.  TSH: No results for input(s): "TSH" in the last 168 hours.    Diagnostic Results:      Brain/Vessel Imaging:  CTA stroke multiphase 1/4/2024      "

## 2024-01-05 NOTE — ASSESSMENT & PLAN NOTE
-Received TNK in ED 1/4/2024 @ 2035  -Admitted to [facility] for close post IV thrombolytic monitoring  -Hold all AC/AP x 24hrs post-TNK  -Stat CTH for any acute neuro exam changes

## 2024-01-05 NOTE — H&P
[facility]  Neurocritical Care  History & Physical    Admit Date: 1/4/2024  Service Date: 01/05/2024  Length of Stay: 1    Subjective:     Chief Complaint: Acute right MCA stroke    History of Present Illness: Ms. Alivia Thomas is a 58 y.o. female with PMH of HTN, HLD, DM, morbid obesity, PADDY, depression, asthma, and GERD that presents to [facility] with acute onset mild R MCA syndrome. Early this evening, she experienced LUE, LLE weakness, mild L facial droop, mild dysarthria, and decreased sensation on L. She also notes associated HA and neck pain. LKN ~2.5 [place] [facility]. Initial NIHSS 4. CTH/CTA unremarkable without ICH or LVO. TNK given at 2035. She will be admitted to [facility] for close monitoring and observation post-TNK.      Past Medical History:   Diagnosis Date    Acute right MCA stroke 1/4/2024    Allergy     Anemia     Asthma     Bilateral shoulder bursitis     Cervical stenosis of spine     Chronic pain     DDD (degenerative disc disease), cervical     Depression 01/28/2019    Diabetes mellitus type II     DJD (degenerative joint disease) of knee 06/19/2014    Facet arthritis of lumbar region 12/17/2015    Facet syndrome 12/17/2015    GERD (gastroesophageal reflux disease)     Heartburn     Hyperlipidemia     Hypertension     Morbid obesity     Neuromuscular disorder     Nuclear sclerotic cataract of left eye 8/8/2023    PADDY (obstructive sleep apnea)     Proteinuria     Right carpal tunnel syndrome     Sacroiliitis 06/13/2018    Sacroiliitis     Sleep apnea     Uterine fibroid      Past Surgical History:   Procedure Laterality Date    CARPAL TUNNEL RELEASE      CARPAL TUNNEL RELEASE  1980s    left    CARPAL TUNNEL RELEASE  2012    right    CATARACT EXTRACTION W/  INTRAOCULAR LENS IMPLANT Left 8/8/2023    Procedure: EXTRACTION, CATARACT, WITH IOL INSERTION;  Surgeon: Justin Block MD;  Location: [facility];  Service: Ophthalmology;  Laterality: Left;    CATARACT EXTRACTION W/   INTRAOCULAR LENS IMPLANT Right 8/29/2023    Procedure: EXTRACTION, CATARACT, WITH IOL INSERTION;  Surgeon: Justin Block MD;  Location: [facility] OR;  Service: Ophthalmology;  Laterality: Right;    COLONOSCOPY N/A 6/15/2020    Procedure: COLONOSCOPY;  Surgeon: Jc Koo MD;  Location: [facility] ENDO (4TH FLR);  Service: Endoscopy;  Laterality: N/A;  COVID screening on 6/13/20 at [facility]-rb  pt updated on drop off location and no visitor policy-rb    EPIDURAL STEROID INJECTION N/A 7/24/2023    Procedure: INJECTION, STEROID, EPIDURAL, L5/S1 SOONER DATE;  Surgeon: Israel Hurtado MD;  Location: [facility] PAIN MGT;  Service: Pain Management;  Laterality: N/A;    ESOPHAGOGASTRODUODENOSCOPY N/A 3/27/2019    Procedure: EGD (ESOPHAGOGASTRODUODENOSCOPY);  Surgeon: Robbin Bar MD;  Location: [facility] (2ND FLR);  Service: Endoscopy;  Laterality: N/A;  BMI 61.3/2nd floor case/svn    INJECTION OF JOINT Bilateral 6/9/2020    Procedure: INJECTION, JOINT, BILATERAL SI;  Surgeon: Lina Wagner MD;  Location: [facility] PAIN MGT;  Service: Pain Management;  Laterality: Bilateral;  B/L SI Joint Injection    INJECTION OF JOINT Bilateral 5/11/2021    Procedure: INJECTION, JOINT, SACROILIAC (SI) need consent;  Surgeon: Lina Wagner MD;  Location: [facility] PAIN MGT;  Service: Pain Management;  Laterality: Bilateral;    JOINT REPLACEMENT Bilateral     with 2 revisions on rt    KNEE SURGERY  3/2010    orthroscope    KNEE SURGERY  6-19-14    left TKR    LAPAROSCOPIC SLEEVE GASTRECTOMY N/A 8/28/2019    Procedure: GASTRECTOMY, SLEEVE, LAPAROSCOPIC with intraop EGD;  Surgeon: Heriberto Clements MD;  Location: [facility] OR 2ND FLR;  Service: General;  Laterality: N/A;    PANNICULECTOMY N/A 6/14/2022    Procedure: PANNICULECTOMY;  Surgeon: Rhett Gray MD;  Location: [facility] OR 2ND FLR;  Service: Plastics;  Laterality: N/A;    RADIOFREQUENCY ABLATION Right 7/9/2019    Procedure: RADIOFREQUENCY ABLATION;  Surgeon: Lina QUICK  MD Kristy;  Location: [facility] PAIN MGT;  Service: Pain Management;  Laterality: Right;  RIGHT RFA L2,3,4,5  2 of 2    RADIOFREQUENCY ABLATION Left 12/19/2019    Procedure: RADIOFREQUENCY ABLATION, LEFT L2-L3-L4-L5 MEDIAL BRANCH 1 OF 2;  Surgeon: Lina Wagner MD;  Location: [facility] PAIN MGT;  Service: Pain Management;  Laterality: Left;    RADIOFREQUENCY ABLATION Right 12/31/2019    Procedure: RADIOFREQUENCY ABLATION, RIGHT L2-L3-L4-L5  2 OF 2;  Surgeon: Lina Wagner MD;  Location: [facility] PAIN MGT;  Service: Pain Management;  Laterality: Right;    RADIOFREQUENCY ABLATION Right 10/13/2020    Procedure: RADIOFREQUENCY ABLATION, Genicular Cooled 1 of 2;  Surgeon: Lina Wagner MD;  Location: [facility] PAIN MGT;  Service: Pain Management;  Laterality: Right;    RADIOFREQUENCY ABLATION Left 11/3/2020    Procedure: RADIOFREQUENCY ABLATION, GENICULAR COOLED  2 OF 2;  Surgeon: Lina Wagner MD;  Location: [facility] PAIN MGT;  Service: Pain Management;  Laterality: Left;    RADIOFREQUENCY ABLATION Left 12/15/2020    Procedure: RADIOFREQUENCY ABLATION LEFT L2,3,4,5 1 of 2;  Surgeon: Lina Wagner MD;  Location: [facility] PAIN MGT;  Service: Pain Management;  Laterality: Left;    RADIOFREQUENCY ABLATION Right 12/29/2020    Procedure: RADIOFREQUENCY ABLATION RIGHT L2,3,4,5 2 of 2;  Surgeon: Lina Wagner MD;  Location: [facility] PAIN MGT;  Service: Pain Management;  Laterality: Right;    RADIOFREQUENCY ABLATION Left 6/29/2021    Procedure: RADIOFREQUENCY ABLATION GENICULAR COOLED;  Surgeon: Lina Wagner MD;  Location: [facility] PAIN MGT;  Service: Pain Management;  Laterality: Left;  1 of 2    RADIOFREQUENCY ABLATION Right 7/13/2021    Procedure: RADIOFREQUENCY ABLATION GENICULAR;  Surgeon: Lina Wagner MD;  Location: [facility] PAIN MGT;  Service: Pain Management;  Laterality: Right;  2 of 2    RADIOFREQUENCY ABLATION Right 8/24/2021    Procedure: RADIOFREQUENCY ABLATION, L2-L3-L4-L5 MEDIAL BRANCH 1 OF 2;  Surgeon:  Lina Wagner MD;  Location: [facility] PAIN MGT;  Service: Pain Management;  Laterality: Right;    RADIOFREQUENCY ABLATION Left 9/11/2021    Procedure: RADIOFREQUENCY ABLATION, L2-L3-L4-L5 MEDIAL BR ANCH 2 OF 2;  Surgeon: Lina Wagner MD;  Location: [facility] PAIN MGT;  Service: Pain Management;  Laterality: Left;    RADIOFREQUENCY ABLATION Right 3/7/2022    Procedure: RADIOFREQUENCY ABLATION RIGHT L3,4,5 1 of 2, needs consent;  Surgeon: Israel Hurtado MD;  Location: [facility] PAIN MGT;  Service: Pain Management;  Laterality: Right;    RADIOFREQUENCY ABLATION Left 3/21/2022    Procedure: RADIOFREQUENCY ABLATION RIGHT L3,4,5 2 of 2, needs consent;  Surgeon: Israel Hurtado MD;  Location: [facility] PAIN MGT;  Service: Pain Management;  Laterality: Left;    RADIOFREQUENCY ABLATION Right 5/9/2022    Procedure: RADIOFREQUENCY ABLATION RIGHT GENICULAR COOLED 1 of 2;  Surgeon: Israel Hurtado MD;  Location: [facility] PAIN MGT;  Service: Pain Management;  Laterality: Right;    RADIOFREQUENCY ABLATION Left 5/23/2022    Procedure: RADIOFREQUENCY ABLATION LEFT GENICULAR COOLED  2 of 2;  Surgeon: Israel Hurtado MD;  Location: [facility] PAIN MGT;  Service: Pain Management;  Laterality: Left;    RADIOFREQUENCY ABLATION Right 12/12/2022    Procedure: RADIOFREQUENCY ABLATION RIGHT GENICULAR COOLED  ONE OF TWO  NEEDS CONSENT;  Surgeon: Israel Hurtado MD;  Location: [facility] PAIN MGT;  Service: Pain Management;  Laterality: Right;    RADIOFREQUENCY ABLATION Left 1/9/2023    Procedure: RADIOFREQUENCY ABLATION LEFT GENICULAR COOLED  TWO OF TWO NEEDS CONSENT;  Surgeon: Israel Hurtado MD;  Location: [facility] PAIN MGT;  Service: Pain Management;  Laterality: Left;    RADIOFREQUENCY ABLATION Right 3/6/2023    Procedure: RADIOFREQUENCY ABLATION RIGHT L3,L4,L5;  Surgeon: Israel Hurtado MD;  Location: [facility] PAIN MGT;  Service: Pain Management;  Laterality: Right;    RADIOFREQUENCY ABLATION Left 5/22/2023    Procedure: RADIOFREQUENCY ABLATION LEFT L3,L4,L5;  Surgeon: Israel Hurtado MD;   Location: [facility] PAIN MGT;  Service: Pain Management;  Laterality: Left;  4/3 LM TO R/S    RADIOFREQUENCY ABLATION Right 11/27/2023    Procedure: RADIOFREQUENCY ABLATION RIGHT L3, 4, 5 1 OF 3;  Surgeon: Israel Hurtado MD;  Location: [facility] PAIN MGT;  Service: Pain Management;  Laterality: Right;    RADIOFREQUENCY ABLATION OF LUMBAR MEDIAL BRANCH NERVE AT SINGLE LEVEL Left 6/26/2018    Procedure: RADIOFREQUENCY ABLATION, NERVE, MEDIAL BRANCH, LUMBAR, 1 LEVEL;  Surgeon: Lina Wagner MD;  Location: [facility] PAIN MGT;  Service: Pain Management;  Laterality: Left;  Left Cooled RFA @ L2,3,4,5  14060-57229  with Sedation    1 of 2    RADIOFREQUENCY ABLATION OF LUMBAR MEDIAL BRANCH NERVE AT SINGLE LEVEL Right 7/10/2018    Procedure: RADIOFREQUENCY ABLATION, NERVE, MEDIAL BRANCH, LUMBAR, 1 LEVEL;  Surgeon: Lina Wagner MD;  Location: [facility] PAIN MGT;  Service: Pain Management;  Laterality: Right;  RIght Cooled RFA @ L2,3,4,5  96[address]-35[address] with Sedation    2 of 2    TRIGGER FINGER RELEASE Right 2017    TRIGGER FINGER RELEASE Left 7/29/2019    Procedure: RELEASE, TRIGGER FINGER, Left Thumb;  Surgeon: Velma Garcia MD;  Location: [facility] OR;  Service: Orthopedics;  Laterality: Left;  Local w/ [facility]      Current Facility-Administered Medications on File Prior to Encounter   Medication Dose Route Frequency Provider Last Rate Last Admin    0.9%  NaCl infusion   Intravenous Continuous Lina Wagner MD 25 mL/hr at 12/15/20 1506 25 mL/hr at 12/15/20 1506    0.9%  NaCl infusion   Intravenous Continuous Ciro Chung MD         Current Outpatient Medications on File Prior to Encounter   Medication Sig Dispense Refill    albuterol (VENTOLIN HFA) 90 mcg/actuation inhaler INHALE TWO PUFFS INTO LUNGS EVERY 4 TO 6 HOURS AS NEEDED FOR SHORTNESS OF BREATH AND FOR WHEEZING 18 g 1    allopurinoL (ZYLOPRIM) 300 MG tablet Take 1 tablet (300 mg total) by mouth 2 (two) times daily. 180 tablet 3    atorvastatin (LIPITOR) 20 MG tablet  TAKE 1 TABLET (20 MG TOTAL) BY MOUTH ONCE DAILY. 90 tablet 2    b complex vitamins tablet Take 1 tablet by mouth every other day.       calcium citrate/vitamin D2 (ISIAH-CITRATE ORAL) Take 500 mg by mouth 2 (two) times daily.      colchicine, gout, (MITIGARE) 0.6 mg Cap TAKE 1 CAPSULE (0.6 MG TOTAL) BY MOUTH DAILY AS NEEDED (FLARE UP). 90 capsule 1    cyanocobalamin (VITAMIN B-12) 1000 MCG tablet Take 100 mcg by mouth every morning.      diclofenac sodium (VOLTAREN) 1 % Gel Apply 2 g topically 4 (four) times daily as needed. To painful area on the feet. 500 g 5    FLUoxetine (PROZAC) 20 mg/5 mL (4 mg/mL) solution TAKE 5 MLS BY MOUTH ONCE DAILY. 450 mL 3    fluticasone propionate (FLONASE) 50 mcg/actuation nasal spray 1 SPRAY BY EACH NOSTRIL ROUTE 2 TIMES DAILY AS NEEDED FOR RHINITIS. 48 g 3    indomethacin (INDOCIN) 50 MG capsule TAKE 1 CAPSULE BY MOUTH 2 TIMES DAILY WITH MEALS 30 capsule 2    levoFLOXacin (LEVAQUIN) 500 MG tablet Take 1 tablet (500 mg total) by mouth once daily. 10 tablet 0    LIDOcaine (XYLOCAINE) 5 % Oint ointment APPLY TOPICALLY AS NEEDED. 35.44 g 6    MOUNJARO 2.5 mg/0.5 mL PnIj INJECT 2.5 MG INTO THE SKIN EVERY 7 DAYS. 12 Pen 1    MULTIVIT-IRON-MIN-FOLIC ACID 3,500-18-0.4 UNIT-MG-MG ORAL CHEW Take 1 tablet by mouth 2 (two) times daily.       nystatin (MYCOSTATIN) powder Apply topically 2 (two) times daily. 60 g 3    omeprazole (PRILOSEC) 20 MG capsule TAKE 1 CAPSULE (20 MG TOTAL) BY MOUTH EVERY MORNING. 90 capsule 2    oxybutynin (DITROPAN-XL) 5 MG TR24 TAKE 1 TABLET BY MOUTH ONCE DAILY. 90 tablet 3    [] oxyCODONE-acetaminophen (PERCOCET)  mg per tablet Take 1 tablet by mouth every 8 (eight) hours as needed for Pain. 90 tablet 0    tiZANidine (ZANAFLEX) 4 MG tablet TAKE 1 TABLET (4 MG TOTAL) BY MOUTH EVERY 8 (EIGHT) HOURS AS NEEDED (MUSCLE PAIN). 90 tablet 2    tretinoin microspheres (RETIN-A MICRO PUMP) 0.08 % GlwP Apply to affected area daily 50 g 0    urea (CARMOL) 40 % Crea  Apply topically once daily. To dry skin on the feet. 120 g 5    vitamin D 185 MG Tab Take 5,000 mg by mouth every morning.         Allergies: Cossayuna  Family History   Problem Relation Age of Onset    Diabetes Mother     Cataracts Mother     Diabetes Father     Cataracts Father     Hypertension Sister     Diabetes Sister     No Known Problems Sister     Cancer Sister         lymphoma     Coronary artery disease Brother     Diabetes Brother     Diabetes Brother     Hypotension Brother     Kidney failure Brother     Hypotension Brother     Amblyopia Neg Hx     Blindness Neg Hx     Glaucoma Neg Hx     Macular degeneration Neg Hx     Retinal detachment Neg Hx     Strabismus Neg Hx     Stroke Neg Hx     Thyroid disease Neg Hx     Anesthesia problems Neg Hx      Social History     Tobacco Use    Smoking status: Never    Smokeless tobacco: Never   Substance Use Topics    Alcohol use: Not Currently     Comment: occasionally     Drug use: No     Review of Systems   Musculoskeletal:  Positive for neck pain.   Neurological:  Positive for facial asymmetry, speech difficulty, weakness, numbness and headaches.     Objective:     Vitals:    Temp: 98.4 °F (36.9 °C)  Pulse: 66  BP: (!) 148/80  MAP (mmHg): 107  Resp: 18  SpO2: 99 %    Temp  Min: 98.2 °F (36.8 °C)  Max: 98.4 °F (36.9 °C)  Pulse  Min: 65  Max: 70  BP  Min: 130/67  Max: 172/83  MAP (mmHg)  Min: 93  Max: 124  Resp  Min: 16  Max: 20  SpO2  Min: 97 %  Max: 100 %    No intake/output data recorded.            Physical Exam  Constitutional:       Appearance: Normal appearance.   Cardiovascular:      Rate and Rhythm: Normal rate and regular rhythm.   Pulmonary:      Effort: Pulmonary effort is normal. No respiratory distress.   Abdominal:      Palpations: Abdomen is soft.   Neurological:      Mental Status: She is alert.      Comments: Sedation: None  [id number]  Follows all commands  No aphasa, dysarthria  No facial droop noted  EOMI, PERRL  Visual field intact  5/5  throughout  SILT       Current NIH Stroke Score Values      Flowsheet Row Most Recent Value   Interval baseline   1a. Level of Consciousness 0-->Alert, keenly responsive   1b. LOC Questions 0-->Answers both questions correctly   1c. LOC Commands 0-->Performs both tasks correctly   2. Best Gaze 0-->Normal   3. Visual 0-->No visual loss   4. Facial Palsy 0-->Normal symmetrical movements   5a. Motor Arm, Left 0-->No drift, limb holds 90 (or 45) degrees for full 10 secs   5b. Motor Arm, Right 0-->No drift, limb holds 90 (or 45) degrees for full 10 secs   6a. Motor Leg, Left 0-->No drift, leg holds 30 degree position for full 5 secs   6b. Motor Leg, Right 0-->No drift, leg holds 30 degree position for full 5 secs   7. Limb Ataxia 0-->Absent   8. Sensory 0-->Normal, no sensory loss   9. Best Language 0-->No aphasia, normal   10. Dysarthria 0-->Normal   11. Extinction and Inattention (formerly Neglect) 0-->No abnormality   Total (NIH Stroke Scale) 0                Today I personally reviewed pertinent medications, lines/drains/airways, imaging, cardiology results, laboratory results, microbiology results, notably: CTA/CT        Assessment/Plan:     Neuro  * Acute right MCA stroke  Antithrombotics for secondary stroke prevention: Antiplatelets: None: Hold all Antithrombotics x 24 hours after IV Thrombolytic therapy administration    Statins for secondary stroke prevention and hyperlipidemia, if present:   Statins: Atorvastatin- 40 mg daily    Aggressive risk factor modification: HTN, DM, HLD, Obesity     Rehab efforts: The patient has been evaluated by a stroke team provider and the therapy needs have been fully considered based off the presenting complaints and exam findings. The following therapy evaluations are needed: PT evaluate and treat, OT evaluate and treat, SLP evaluate and treat, PM&R evaluate for appropriate placement    Diagnostics ordered/pending: CT scan of head without contrast to asses brain parenchyma, CTA  Neck/Arch to assess vasculature, HgbA1C to assess blood glucose levels, Lipid Profile to assess cholesterol levels, MRI head without contrast to assess brain parenchyma, TTE to assess cardiac function/status , TSH to assess thyroid function    VTE prophylaxis: Mechanical prophylaxis: Place SCDs  None: Reason for No Pharmacological VTE Prophylaxis: Holding x 24 hours s/p treatment with Thrombolytic therapy    BP parameters: Infarct: Post Thrombolytic therapy, SBP <180        Chronic pain  Prescribed opioids at home  Resume as appropriate    Psychiatric  Recurrent major depressive disorder, in partial remission  Resume home prozac    Pulmonary  Mild intermittent asthma without complication  Home prn nebs resumed    Cardiac/Vascular  Hyperlipemia  Lipid panel pending  Statin    Hematology  Received tissue plasminogen activator (t-PA) less than 24 hours prior to arrival  59 y/o F presenting with R MCA syndrome s/p tPA. No LVO noted on CTA.     -Admit to NCC, stroke following  -q1h neuro checks, vital checks  -EKG, ECHO, CXR  -A1c, TSH, lipid panel, coag panel  -Daily CBC, CMP, mag, phos  -SBP < 180, prn labetalol and hydralazine  -Statin  -SCD, Hold ACAP in post TNK setting x 24 hours  -PT/OT/SLP  -MRI pending    Endocrine  Type 2 diabetes mellitus without complication, without long-term current use of insulin  A1c pending  SSI, Accuchecks   Diabetic diet when appropriate    Morbid obesity  Stroke risk factor  Nutrition consulted     GI  GERD (gastroesophageal reflux disease)  Prilosec at home  Famotidine while inpatient 2/2 prilosec shortage    Orthopedic  Gout  Last uric acid level WNL  Resume home allopurinol      Other  Impaired functional mobility, balance, gait, and endurance  Per prior outpatient PT note from 3 months ago     PADDY (obstructive sleep apnea)  Confirm use of home CPAP and resume as appropriate          The patient is being Prophylaxed for:  Venous Thromboembolism with: Mechanical  Stress Ulcer with:  H2B  Ventilator Pneumonia with: not applicable    Activity Orders            Diet diabetic 2000 Calorie: Diabetic starting at 01/05 0000    Turn patient starting at 01/04 2200    Elevate HOB starting at 01/04 2032          Full Code    36 minutes of Critical care time was spent personally by me on the following activities: development of treatment plan with patient or surrogate and bedside caregivers, discussions with consultants, evaluation of patient's response to treatment, examination of patient, ordering and performing treatments and interventions, ordering and review of laboratory studies, ordering and review of radiographic studies, pulse oximetry, antibiotic titration if applicable, vasopressor titration if applicable, re-evaluation of patient's condition. This critical care time did not overlap with that of any other provider or involve time for any procedures. There is high probability for acute neurological change leading to clinical and possibly life-threatening deterioration requiring highest level of provider preparedness for urgent intervention.     Mary Lou Collins PA-C  Neurocritical Care  [facility]

## 2024-01-05 NOTE — ASSESSMENT & PLAN NOTE
57 y/o F presenting with R MCA syndrome s/p tPA 1/4/2024 dose completed @20:30 per report. No LVO noted on CTA.     -Admitted to NCC, stroke following  -q1h neuro checks, vital checks for 24 hours per protocol  -EKG, ECHO, CXR completed  -A1c, TSH, lipid panel, coag panel  -Daily CBC, CMP, mag, phos  -SBP < 180, prn labetalol and hydralazine  -Statin  -SCD, Hold ACAP in post TNK setting x 24 hours  -PT/OT/SLP  -MRI pending

## 2024-01-05 NOTE — ASSESSMENT & PLAN NOTE
59 y/o F presenting with R MCA syndrome s/p tPA. No LVO noted on CTA.     -Admit to NCC, stroke following  -q1h neuro checks, vital checks  -EKG, ECHO, CXR  -A1c, TSH, lipid panel, coag panel  -Daily CBC, CMP, mag, phos  -SBP < 180, prn labetalol and hydralazine  -Statin  -SCD, Hold ACAP in post TNK setting x 24 hours  -PT/OT/SLP  -MRI pending

## 2024-01-05 NOTE — SUBJECTIVE & OBJECTIVE
Past Medical History:   Diagnosis Date    Acute right MCA stroke 1/4/2024    Allergy     Anemia     Asthma     Bilateral shoulder bursitis     Cervical stenosis of spine     Chronic pain     DDD (degenerative disc disease), cervical     Depression 01/28/2019    Diabetes mellitus type II     DJD (degenerative joint disease) of knee 06/19/2014    Facet arthritis of lumbar region 12/17/2015    Facet syndrome 12/17/2015    GERD (gastroesophageal reflux disease)     Heartburn     Hyperlipidemia     Hypertension     Morbid obesity     Neuromuscular disorder     Nuclear sclerotic cataract of left eye 8/8/2023    PADDY (obstructive sleep apnea)     Proteinuria     Right carpal tunnel syndrome     Sacroiliitis 06/13/2018    Sacroiliitis     Sleep apnea     Uterine fibroid      Past Surgical History:   Procedure Laterality Date    CARPAL TUNNEL RELEASE      CARPAL TUNNEL RELEASE  1980s    left    CARPAL TUNNEL RELEASE  2012    right    CATARACT EXTRACTION W/  INTRAOCULAR LENS IMPLANT Left 8/8/2023    Procedure: EXTRACTION, CATARACT, WITH IOL INSERTION;  Surgeon: Justin Block MD;  Location: [facility] OR;  Service: Ophthalmology;  Laterality: Left;    CATARACT EXTRACTION W/  INTRAOCULAR LENS IMPLANT Right 8/29/2023    Procedure: EXTRACTION, CATARACT, WITH IOL INSERTION;  Surgeon: Justin Block MD;  Location: [facility] OR;  Service: Ophthalmology;  Laterality: Right;    COLONOSCOPY N/A 6/15/2020    Procedure: COLONOSCOPY;  Surgeon: Jc Koo MD;  Location: [address]);  Service: Endoscopy;  Laterality: N/A;  COVID screening on 6/13/20 at [facility]  pt updated on drop off location and no visitor [facility]    EPIDURAL STEROID INJECTION N/A 7/24/2023    Procedure: INJECTION, STEROID, EPIDURAL, L5/S1 SOONER DATE;  Surgeon: Israel Hurtado MD;  Location: [facility] PAIN MGT;  Service: Pain Management;  Laterality: N/A;    ESOPHAGOGASTRODUODENOSCOPY N/A 3/27/2019    Procedure: EGD (ESOPHAGOGASTRODUODENOSCOPY);   Surgeon: Robbin Bar MD;  Location: [facility] ENDO (2ND FLR);  Service: Endoscopy;  Laterality: N/A;  BMI 61.3/2nd floor case/svn    INJECTION OF JOINT Bilateral 6/9/2020    Procedure: INJECTION, JOINT, BILATERAL SI;  Surgeon: Lina Wagner MD;  Location: [facility] PAIN MGT;  Service: Pain Management;  Laterality: Bilateral;  B/L SI Joint Injection    INJECTION OF JOINT Bilateral 5/11/2021    Procedure: INJECTION, JOINT, SACROILIAC (SI) need consent;  Surgeon: Lina Wagner MD;  Location: [facility] PAIN MGT;  Service: Pain Management;  Laterality: Bilateral;    JOINT REPLACEMENT Bilateral     with 2 revisions on rt    KNEE SURGERY  3/2010    orthroscope    KNEE SURGERY  6-19-14    left TKR    LAPAROSCOPIC SLEEVE GASTRECTOMY N/A 8/28/2019    Procedure: GASTRECTOMY, SLEEVE, LAPAROSCOPIC with intraop EGD;  Surgeon: Heriberto Clements MD;  Location: [facility] OR 2ND FLR;  Service: General;  Laterality: N/A;    PANNICULECTOMY N/A 6/14/2022    Procedure: PANNICULECTOMY;  Surgeon: Rhett Gray MD;  Location: [facility] OR 2ND FLR;  Service: Plastics;  Laterality: N/A;    RADIOFREQUENCY ABLATION Right 7/9/2019    Procedure: RADIOFREQUENCY ABLATION;  Surgeon: Lina Wagner MD;  Location: [facility] PAIN MGT;  Service: Pain Management;  Laterality: Right;  RIGHT RFA L2,3,4,5  2 of 2    RADIOFREQUENCY ABLATION Left 12/19/2019    Procedure: RADIOFREQUENCY ABLATION, LEFT L2-L3-L4-L5 MEDIAL BRANCH 1 OF 2;  Surgeon: Lina Wagner MD;  Location: [facility] PAIN MGT;  Service: Pain Management;  Laterality: Left;    RADIOFREQUENCY ABLATION Right 12/31/2019    Procedure: RADIOFREQUENCY ABLATION, RIGHT L2-L3-L4-L5  2 OF 2;  Surgeon: Lina Wagner MD;  Location: [facility] PAIN MGT;  Service: Pain Management;  Laterality: Right;    RADIOFREQUENCY ABLATION Right 10/13/2020    Procedure: RADIOFREQUENCY ABLATION, Genicular Cooled 1 of 2;  Surgeon: Lina Wagner MD;  Location: [facility] PAIN MGT;  Service: Pain Management;  Laterality:  Right;    RADIOFREQUENCY ABLATION Left 11/3/2020    Procedure: RADIOFREQUENCY ABLATION, GENICULAR COOLED  2 OF 2;  Surgeon: Lina Wagner MD;  Location: [facility] PAIN MGT;  Service: Pain Management;  Laterality: Left;    RADIOFREQUENCY ABLATION Left 12/15/2020    Procedure: RADIOFREQUENCY ABLATION LEFT L2,3,4,5 1 of 2;  Surgeon: Lina Wagner MD;  Location: [facility] PAIN MGT;  Service: Pain Management;  Laterality: Left;    RADIOFREQUENCY ABLATION Right 12/29/2020    Procedure: RADIOFREQUENCY ABLATION RIGHT L2,3,4,5 2 of 2;  Surgeon: Lina Wagner MD;  Location: [facility] PAIN MGT;  Service: Pain Management;  Laterality: Right;    RADIOFREQUENCY ABLATION Left 6/29/2021    Procedure: RADIOFREQUENCY ABLATION GENICULAR COOLED;  Surgeon: iLna Wagner MD;  Location: [facility] PAIN [facility]GT;  Service: Pain Management;  Laterality: Left;  1 of 2    RADIOFREQUENCY ABLATION Right 7/13/2021    Procedure: RADIOFREQUENCY ABLATION GENICULAR;  Surgeon: Lina Wagner MD;  Location: [facility] PAIN MGT;  Service: Pain Management;  Laterality: Right;  2 of 2    RADIOFREQUENCY ABLATION Right 8/24/2021    Procedure: RADIOFREQUENCY ABLATION, L2-L3-L4-L5 MEDIAL BRANCH 1 OF 2;  Surgeon: Lina Wagner MD;  Location: [facility] PAIN MGT;  Service: Pain Management;  Laterality: Right;    RADIOFREQUENCY ABLATION Left 9/11/2021    Procedure: RADIOFREQUENCY ABLATION, L2-L3-L4-L5 MEDIAL BR ANCH 2 OF 2;  Surgeon: Lina Wagner MD;  Location: [facility]IN [facility]GT;  Service: Pain Management;  Laterality: Left;    RADIOFREQUENCY ABLATION Right 3/7/2022    Procedure: RADIOFREQUENCY ABLATION RIGHT L3,4,5 1 of 2, needs consent;  Surgeon: Israel Hurtado MD;  Location: [facility] PAIN MGT;  Service: Pain Management;  Laterality: Right;    RADIOFREQUENCY ABLATION Left 3/21/2022    Procedure: RADIOFREQUENCY ABLATION RIGHT L3,4,5 2 of 2, needs consent;  Surgeon: Israel Hurtado MD;  Location: [facility]IN [facility]GT;  Service: Pain Management;  Laterality: Left;    RADIOFREQUENCY  ABLATION Right 5/9/2022    Procedure: RADIOFREQUENCY ABLATION RIGHT GENICULAR COOLED 1 of 2;  Surgeon: Israel Hurtado MD;  Location: [facility] PAIN MGT;  Service: Pain Management;  Laterality: Right;    RADIOFREQUENCY ABLATION Left 5/23/2022    Procedure: RADIOFREQUENCY ABLATION LEFT GENICULAR COOLED  2 of 2;  Surgeon: Israel Hurtado MD;  Location: [facility] PAIN MGT;  Service: Pain Management;  Laterality: Left;    RADIOFREQUENCY ABLATION Right 12/12/2022    Procedure: RADIOFREQUENCY ABLATION RIGHT GENICULAR COOLED  ONE OF TWO  NEEDS CONSENT;  Surgeon: Israel Hurtado MD;  Location: [facility] PAIN MGT;  Service: Pain Management;  Laterality: Right;    RADIOFREQUENCY ABLATION Left 1/9/2023    Procedure: RADIOFREQUENCY ABLATION LEFT GENICULAR COOLED  TWO OF TWO NEEDS CONSENT;  Surgeon: Israel Hurtado MD;  Location: [facility] PAIN MGT;  Service: Pain Management;  Laterality: Left;    RADIOFREQUENCY ABLATION Right 3/6/2023    Procedure: RADIOFREQUENCY ABLATION RIGHT L3,L4,L5;  Surgeon: Israel Hurtado MD;  Location: [facility]IN [facility];  Service: Pain Management;  Laterality: Right;    RADIOFREQUENCY ABLATION Left 5/22/2023    Procedure: RADIOFREQUENCY ABLATION LEFT L3,L4,L5;  Surgeon: Israel Hurtado MD;  Location: [facility] PAIN MGT;  Service: Pain Management;  Laterality: Left;  4/3 LM TO R/S    RADIOFREQUENCY ABLATION Right 11/27/2023    Procedure: RADIOFREQUENCY ABLATION RIGHT L3, 4, 5 1 OF 3;  Surgeon: Israel Hurtado MD;  Location: [facility] PAIN MGT;  Service: Pain Management;  Laterality: Right;    RADIOFREQUENCY ABLATION OF LUMBAR MEDIAL BRANCH NERVE AT SINGLE LEVEL Left 6/26/2018    Procedure: RADIOFREQUENCY ABLATION, NERVE, MEDIAL BRANCH, LUMBAR, 1 LEVEL;  Surgeon: Lina Wagner MD;  Location: [facility]IN [facility]GT;  Service: Pain Management;  Laterality: Left;  Left Cooled RFA @ L2,3,4,5  [id number]  with Sedation    1 of 2    RADIOFREQUENCY ABLATION OF LUMBAR MEDIAL BRANCH NERVE AT SINGLE LEVEL Right 7/10/2018    Procedure: RADIOFREQUENCY ABLATION, NERVE,  MEDIAL BRANCH, LUMBAR, 1 LEVEL;  Surgeon: Lina Wagner MD;  Location: [facility] PAIN MGT;  Service: Pain Management;  Laterality: Right;  RIght Cooled RFA @ L2,3,4,5  [id number] with Sedation    2 of 2    TRIGGER FINGER RELEASE Right 2017    TRIGGER FINGER RELEASE Left 7/29/2019    Procedure: RELEASE, TRIGGER FINGER, Left Thumb;  Surgeon: Velma Garcia MD;  Location: [facility] OR;  Service: Orthopedics;  Laterality: Left;  Local w/ MAC      Current Facility-Administered Medications on File Prior to Encounter   Medication Dose Route Frequency Provider Last Rate Last Admin    0.9%  NaCl infusion   Intravenous Continuous Lina Wagner MD 25 mL/hr at 12/15/20 1506 25 mL/hr at 12/15/20 1506    0.9%  NaCl infusion   Intravenous Continuous Ciro Chung MD         Current Outpatient Medications on File Prior to Encounter   Medication Sig Dispense Refill    albuterol (VENTOLIN HFA) 90 mcg/actuation inhaler INHALE TWO PUFFS INTO LUNGS EVERY 4 TO 6 HOURS AS NEEDED FOR SHORTNESS OF BREATH AND FOR WHEEZING 18 g 1    allopurinoL (ZYLOPRIM) 300 MG tablet Take 1 tablet (300 mg total) by mouth 2 (two) times daily. 180 tablet 3    atorvastatin (LIPITOR) 20 MG tablet TAKE 1 TABLET (20 MG TOTAL) BY MOUTH ONCE DAILY. 90 tablet 2    b complex vitamins tablet Take 1 tablet by mouth every other day.       calcium citrate/vitamin D2 (ISIAH-CITRATE ORAL) Take 500 mg by mouth 2 (two) times daily.      colchicine, gout, (MITIGARE) 0.6 mg Cap TAKE 1 CAPSULE (0.6 MG TOTAL) BY MOUTH DAILY AS NEEDED (FLARE UP). 90 capsule 1    cyanocobalamin (VITAMIN B-12) 1000 MCG tablet Take 100 mcg by mouth every morning.      diclofenac sodium (VOLTAREN) 1 % Gel Apply 2 g topically 4 (four) times daily as needed. To painful area on the feet. 500 g 5    FLUoxetine (PROZAC) 20 mg/5 mL (4 mg/mL) solution TAKE 5 MLS BY MOUTH ONCE DAILY. 450 mL 3    fluticasone propionate (FLONASE) 50 mcg/actuation nasal spray 1 SPRAY BY EACH NOSTRIL ROUTE 2 TIMES  DAILY AS NEEDED FOR RHINITIS. 48 g 3    indomethacin (INDOCIN) 50 MG capsule TAKE 1 CAPSULE BY MOUTH 2 TIMES DAILY WITH MEALS 30 capsule 2    levoFLOXacin (LEVAQUIN) 500 MG tablet Take 1 tablet (500 mg total) by mouth once daily. 10 tablet 0    LIDOcaine (XYLOCAINE) 5 % Oint ointment APPLY TOPICALLY AS NEEDED. 35.44 g 6    MOUNJARO 2.5 mg/0.5 mL PnIj INJECT 2.5 MG INTO THE SKIN EVERY 7 DAYS. 12 Pen 1    MULTIVIT-IRON-MIN-FOLIC ACID 3,500-18-0.4 UNIT-MG-MG ORAL CHEW Take 1 tablet by mouth 2 (two) times daily.       nystatin (MYCOSTATIN) powder Apply topically 2 (two) times daily. 60 g 3    omeprazole (PRILOSEC) 20 MG capsule TAKE 1 CAPSULE (20 MG TOTAL) BY MOUTH EVERY MORNING. 90 capsule 2    oxybutynin (DITROPAN-XL) 5 MG TR24 TAKE 1 TABLET BY MOUTH ONCE DAILY. 90 tablet 3    [] oxyCODONE-acetaminophen (PERCOCET)  mg per tablet Take 1 tablet by mouth every 8 (eight) hours as needed for Pain. 90 tablet 0    tiZANidine (ZANAFLEX) 4 MG tablet TAKE 1 TABLET (4 MG TOTAL) BY MOUTH EVERY 8 (EIGHT) HOURS AS NEEDED (MUSCLE PAIN). 90 tablet 2    tretinoin microspheres (RETIN-A MICRO PUMP) 0.08 % GlwP Apply to affected area daily 50 g 0    urea (CARMOL) 40 % Crea Apply topically once daily. To dry skin on the feet. 120 g 5    vitamin D 185 MG Tab Take 5,000 mg by mouth every morning.         Allergies: Conesus  Family History   Problem Relation Age of Onset    Diabetes Mother     Cataracts Mother     Diabetes Father     Cataracts Father     Hypertension Sister     Diabetes Sister     No Known Problems Sister     Cancer Sister         lymphoma     Coronary artery disease Brother     Diabetes Brother     Diabetes Brother     Hypotension Brother     Kidney failure Brother     Hypotension Brother     Amblyopia Neg Hx     Blindness Neg Hx     Glaucoma Neg Hx     Macular degeneration Neg Hx     Retinal detachment Neg Hx     Strabismus Neg Hx     Stroke Neg Hx     Thyroid disease Neg Hx     Anesthesia problems Neg Hx       Social History     Tobacco Use    Smoking status: Never    Smokeless tobacco: Never   Substance Use Topics    Alcohol use: Not Currently     Comment: occasionally     Drug use: No     Review of Systems   Musculoskeletal:  Positive for neck pain.   Neurological:  Positive for facial asymmetry, speech difficulty, weakness, numbness and headaches.     Objective:     Vitals:    Temp: 98.4 °F (36.9 °C)  Pulse: 66  BP: (!) 148/80  MAP (mmHg): 107  Resp: 18  SpO2: 99 %    Temp  Min: 98.2 °F (36.8 °C)  Max: 98.4 °F (36.9 °C)  Pulse  Min: 65  Max: 70  BP  Min: 130/67  Max: 172/83  MAP (mmHg)  Min: 93  Max: 124  Resp  Min: 16  Max: 20  SpO2  Min: 97 %  Max: 100 %    No intake/output data recorded.            Physical Exam  Constitutional:       Appearance: Normal appearance.   Cardiovascular:      Rate and Rhythm: Normal rate and regular rhythm.   Pulmonary:      Effort: Pulmonary effort is normal. No respiratory distress.   Abdominal:      Palpations: Abdomen is soft.   Neurological:      Mental Status: She is alert.      Comments: Sedation: None  [id number]  Follows all commands  No aphasa, dysarthria  No facial droop noted  EOMI, PERRL  Visual field intact  5/5 throughout  SILT       Current NIH Stroke Score Values      Flowsheet Row Most Recent Value   Interval baseline   1a. Level of Consciousness 0-->Alert, keenly responsive   1b. LOC Questions 0-->Answers both questions correctly   1c. LOC Commands 0-->Performs both tasks correctly   2. Best Gaze 0-->Normal   3. Visual 0-->No visual loss   4. Facial Palsy 0-->Normal symmetrical movements   5a. Motor Arm, Left 0-->No drift, limb holds 90 (or 45) degrees for full 10 secs   5b. Motor Arm, Right 0-->No drift, limb holds 90 (or 45) degrees for full 10 secs   6a. Motor Leg, Left 0-->No drift, leg holds 30 degree position for full 5 secs   6b. Motor Leg, Right 0-->No drift, leg holds 30 degree position for full 5 secs   7. Limb Ataxia 0-->Absent   8. Sensory 0-->Normal, no  sensory loss   9. Best Language 0-->No aphasia, normal   10. Dysarthria 0-->Normal   11. Extinction and Inattention (formerly Neglect) 0-->No abnormality   Total (NIH Stroke Scale) 0                Today I personally reviewed pertinent medications, lines/drains/airways, imaging, cardiology results, laboratory results, microbiology results, notably: CTA/CT

## 2024-01-05 NOTE — PLAN OF CARE
OT evaluation completed  Problem: Occupational Therapy  Goal: Occupational Therapy Goal  Description: Goals set 1/5 to be addressed with expiration date, 2/3:  Patient will increase functional independence with ADLs by performing:    Patient will demonstrate rolling to the right with modified independence.  Not met   Patient will demonstrate rolling to the left with modified independence.   Not met  Patient will demonstrate supine -sit with modified independence.   Not met  Patient will demonstrate stand pivot transfers with modified independence.   Not met  Patient will demonstrate grooming while standing with modified independence.   Not met  Patient will demonstrate upper body dressing with modified independence while seated EOB.   Not met  Patient will demonstrate lower body dressing with modified independence while seated EOB.   Not met  Patient will demonstrate toileting with modified independence.   Not met  Patient will demonstrate bathing while seated EOB with modified independence.   Not met  Patient's family / caregiver will demonstrate independence and safety with assisting patient with self-care skills and functional mobility.     Not met  Patient and/or patient's family will verbalize understanding of stroke prevention guidelines, personal risk factors and stroke warning signs via teachback method.  Not met             Outcome: Ongoing, Progressing

## 2024-01-05 NOTE — CONSULTS
[facility] - Emergency Dept  Vascular Neurology  [facility]  Consult Note    Inpatient consult to Vascular (Stroke) Neurology  Consult performed by: Christin Chambers PA-C  Consult ordered by: Anders Antonio MD  Reason for consult: Stroke code, acute onset R MCA symptoms        Assessment/Plan:     Patient is a 58 y.o. year old female with:    * Acute right MCA stroke  Alivia Thomas is a 58 y.o. female with PMH of HTN, HLD, DM, morbid obesity, PADDY, depression, asthma, GERD that presents to the ED c/o acute onset mild R MCA syndrome. LKN ~2.5 hrs PTA. Also reported associated HA and neck pain. Of note, exam with some inconsistency in weakness on repeat testing. Exam significant for LUE/LLE weakness with drift, mild L facial droop, mild dysarthria, and decreased sensation to L side that resolves at midline. NIHSS 4. CTH/CTA unremarkable.     No contraindications for thrombolytic therapy, discussed risk/benefit discussed with patient who verbalized understanding and is agreeable to treatment with tenecteplase. TNK given at 2035. Not candidate for thrombectomy due to no LVO on CTA. Patient will be admitted to [facility] for close monitoring and observation post-TNK.      Antithrombotics for secondary stroke prevention: Antiplatelets: None: Hold all Antithrombotics x 24 hours after IV Thrombolytic therapy administration    Statins for secondary stroke prevention and hyperlipidemia, if present:   Statins: Atorvastatin- 40 mg daily    Aggressive risk factor modification: HTN, DM, HLD, Diet, Exercise, Obesity     Rehab efforts: The patient has been evaluated by a stroke team provider and the therapy needs have been fully considered based off the presenting complaints and exam findings. The following therapy evaluations are needed: PT evaluate and treat, OT evaluate and treat, SLP evaluate and treat    Diagnostics ordered/pending: HgbA1C to assess blood glucose levels, Lipid Profile to assess cholesterol levels,  MRI head without contrast to assess brain parenchyma, TTE to assess cardiac function/status , TSH to assess thyroid function    VTE prophylaxis: Mechanical prophylaxis: Place SCDs  None: Reason for No Pharmacological VTE Prophylaxis: Holding x 24 hours s/p treatment with Thrombolytic therapy    BP parameters: Infarct: Post Thrombolytic therapy, SBP <180      Received tissue plasminogen activator (t-PA) less than 24 hours prior to arrival  -Received TNK in ED 1/4/2024 @ 2035  -Admitted to [facility] for close post IV thrombolytic monitoring  -Hold all AC/AP x 24hrs post-TNK  -Stat CTH for any acute neuro exam changes    Recurrent major depressive disorder, in partial remission  -Continue home fluoxetine    Hyperlipemia  -Stroke risk factor  -LDL pending  -Atorvastatin 40mg daily for LDL goal <70    Type 2 diabetes mellitus without complication, without long-term current use of insulin  -Stroke risk factor  -A1c pending  -BG goal while inpatient 140-180  -SSI PRN    PADDY (obstructive sleep apnea)  -Stroke risk factor  -Consider CPAP qHS    Morbid obesity  -Stroke risk factor  - on healthy diet and lifestyle  -Consider nutrition consult        STROKE DOCUMENTATION     Acute Stroke Times   Last Known Normal Date: 01/04/24  Last Known Normal Time: 1730  Symptom Onset Date: 01/04/24  Symptom Onset Time: 1740  Stroke Team Called Date: 01/04/24  Stroke Team Called Time: 1958  Stroke Team Arrival Date: 01/04/24  Stroke Team Arrival Time: 2003  CT Interpretation Time: 2019  Thrombolytic Therapy Recommended: Yes  CTA Interpretation Time: 2030  Thrombectomy Recommended: No  Decision to Treat Time for Tenecteplase: 2020    NIH Scale:  1a. Level of Consciousness: 0-->Alert, keenly responsive  1b. LOC Questions: 0-->Answers both questions correctly  1c. LOC Commands: 0-->Performs both tasks correctly  2. Best Gaze: 0-->Normal  3. Visual: 0-->No visual loss  4. Facial Palsy: 1-->Minor paralysis (flattened nasolabial fold, asymmetry  on smiling)  5a. Motor Arm, Left: 1-->Drift, limb holds 90 (or 45) degrees, but drifts down before full 10 seconds, does not hit bed or other support  5b. Motor Arm, Right: 0-->No drift, limb holds 90 (or 45) degrees for full 10 secs  6a. Motor Leg, Left: 1-->Drift, leg falls by the end of the 5-sec period but does not hit bed  6b. Motor Leg, Right: 0-->No drift, leg holds 30 degree position for full 5 secs  7. Limb Ataxia: 0-->Absent  8. Sensory: 1-->Mild-to-moderate sensory loss, patient feels pinprick is less sharp or is dull on the affected side, or there is a loss of superficial pain with pinprick, but patient is aware of being touched  9. Best Language: 0-->No aphasia, normal  10. Dysarthria: 1-->Mild-to-moderate dysarthria, patient slurs at least some words and, at worst, can be understood with some difficulty  11. Extinction and Inattention (formerly Neglect): 0-->No abnormality  Total (NIH Stroke Scale): 5    Modified [place] Score: 0  [place] Coma Scale:    ABCD2 Score:    [id number] Score:   HAS -BLED Score:   ICH Score:   Hunt & Rosen Classification:       Thrombolysis Candidate? Yes. The risks and benefits of Tenecteplase were discussed with patient/family. Also discussed that medication is off-FDA label but that it is within the standard of care and appropriate for their treatment. The patient and/or family verbalized understanding of risks, benefits, off-label nature, and has given verbal consent for Tenecteplase. If patient was not competent, or no family was available, treatment will be administered as an emergency procedure and in what we believe to be the patients best interest.    Delays to Thrombolysis?  Not Applicable    Interventional Revascularization Candidate?   Is the patient eligible for mechanical endovascular reperfusion (VARUN)?  No; No large vessel occlusion identified on imaging     Delays to Thrombectomy? Not Applicable    Hemorrhagic change of an Ischemic Stroke: Does this patient  have an ischemic stroke with hemorrhagic changes? No     Subjective:     History of Present Illness:  Alivia Thomas is a 58 y.o. female with PMH of HTN, HLD, DM, morbid obesity, PADDY, depression, asthma, GERD that presents to the ED c/o acute onset L hemiparesis, L facial droop, L paresthesia, and dysarthria. LKN 5:30pm, approx. 2.5 hrs PTA. Patient also reports associated HA and neck pain. Denies history of similar episodes, CP, SOB, difficulty swallowing, or new vision changes. Of note, exam with some inconsistency in weakness on repeat testing. Exam significant for LUE/LLE weakness with drift, mild L facial droop, mild dysarthria, and decreased sensation to L side that resolves at midline. NIHSS 4. CTH/CTA stroke multiphase unremarkable for acute hemorrhage, LVO or critical stenosis. No contraindications for thrombolytic therapy, discussed risk/benefit discussed with patient who verbalized understanding and is agreeable to treatment with tenecteplase. TNK given at 2035. Not candidate for thrombectomy due to no LVO on CTA. Patient will be admitted to [facility] for close monitoring and observation post-TNK.          Past Medical History:   Diagnosis Date    Allergy     Anemia     Asthma     Bilateral shoulder bursitis     Cervical stenosis of spine     Chronic pain     DDD (degenerative disc disease), cervical     Depression 01/28/2019    Diabetes mellitus type II     DJD (degenerative joint disease) of knee 06/19/2014    Facet arthritis of lumbar region 12/17/2015    Facet syndrome 12/17/2015    GERD (gastroesophageal reflux disease)     Heartburn     Hyperlipidemia     Hypertension     Morbid obesity     Neuromuscular disorder     Nuclear sclerotic cataract of left eye 8/8/2023    PADDY (obstructive sleep apnea)     Proteinuria     Right carpal tunnel syndrome     Sacroiliitis 06/13/2018    Sacroiliitis     Sleep apnea     Uterine fibroid      Past Surgical History:   Procedure Laterality Date    CARPAL TUNNEL RELEASE       CARPAL TUNNEL RELEASE  1980s    left    CARPAL TUNNEL RELEASE  2012    right    CATARACT EXTRACTION W/  INTRAOCULAR LENS IMPLANT Left 8/8/2023    Procedure: EXTRACTION, CATARACT, WITH IOL INSERTION;  Surgeon: Justin Block MD;  Location: [facility] OR;  Service: Ophthalmology;  Laterality: Left;    CATARACT EXTRACTION W/  INTRAOCULAR LENS IMPLANT Right 8/29/2023    Procedure: EXTRACTION, CATARACT, WITH IOL INSERTION;  Surgeon: Justin Block MD;  Location: [facility] OR;  Service: Ophthalmology;  Laterality: Right;    COLONOSCOPY N/A 6/15/2020    Procedure: COLONOSCOPY;  Surgeon: Jc Koo MD;  Location: [facility] (4TH FLR);  Service: Endoscopy;  Laterality: N/A;  COVID screening on 6/13/20 at [facility]-  pt updated on drop off location and no visitor [facility]-    EPIDURAL STEROID INJECTION N/A 7/24/2023    Procedure: INJECTION, STEROID, EPIDURAL, L5/S1 SOONER DATE;  Surgeon: Israel Hurtado MD;  Location: [facility] PAIN MGT;  Service: Pain Management;  Laterality: N/A;    ESOPHAGOGASTRODUODENOSCOPY N/A 3/27/2019    Procedure: EGD (ESOPHAGOGASTRODUODENOSCOPY);  Surgeon: Robbin Bar MD;  Location: [facility] (2ND FLR);  Service: Endoscopy;  Laterality: N/A;  BMI 61.3/2nd floor case/svn    INJECTION OF JOINT Bilateral 6/9/2020    Procedure: INJECTION, JOINT, BILATERAL SI;  Surgeon: Lina Wagner MD;  Location: [facility] PAIN MGT;  Service: Pain Management;  Laterality: Bilateral;  B/L SI Joint Injection    INJECTION OF JOINT Bilateral 5/11/2021    Procedure: INJECTION, JOINT, SACROILIAC (SI) need consent;  Surgeon: Lina Wagner MD;  Location: [facility] PAIN MGT;  Service: Pain Management;  Laterality: Bilateral;    JOINT REPLACEMENT Bilateral     with 2 revisions on rt    KNEE SURGERY  3/2010    orthroscope    KNEE SURGERY  6-19-14    left TKR    LAPAROSCOPIC SLEEVE GASTRECTOMY N/A 8/28/2019    Procedure: GASTRECTOMY, SLEEVE, LAPAROSCOPIC with intraop EGD;  Surgeon: Heriberto Clements MD;   Location: [facility] OR 2ND FLR;  Service: General;  Laterality: N/A;    PANNICULECTOMY N/A 6/14/2022    Procedure: PANNICULECTOMY;  Surgeon: Rhett Gray MD;  Location: [facility] OR 2ND FLR;  Service: Plastics;  Laterality: N/A;    RADIOFREQUENCY ABLATION Right 7/9/2019    Procedure: RADIOFREQUENCY ABLATION;  Surgeon: Lina Wagner MD;  Location: [facility] PAIN MGT;  Service: Pain Management;  Laterality: Right;  RIGHT RFA L2,3,4,5  2 of 2    RADIOFREQUENCY ABLATION Left 12/19/2019    Procedure: RADIOFREQUENCY ABLATION, LEFT L2-L3-L4-L5 MEDIAL BRANCH 1 OF 2;  Surgeon: Lina Wagner MD;  Location: [facility] PAIN MGT;  Service: Pain Management;  Laterality: Left;    RADIOFREQUENCY ABLATION Right 12/31/2019    Procedure: RADIOFREQUENCY ABLATION, RIGHT L2-L3-L4-L5  2 OF 2;  Surgeon: Lina Wagner MD;  Location: [facility] PAIN MGT;  Service: Pain Management;  Laterality: Right;    RADIOFREQUENCY ABLATION Right 10/13/2020    Procedure: RADIOFREQUENCY ABLATION, Genicular Cooled 1 of 2;  Surgeon: Lina Wagner MD;  Location: [facility] PAIN MGT;  Service: Pain Management;  Laterality: Right;    RADIOFREQUENCY ABLATION Left 11/3/2020    Procedure: RADIOFREQUENCY ABLATION, GENICULAR COOLED  2 OF 2;  Surgeon: Lina Wagner MD;  Location: [facility] PAIN MGT;  Service: Pain Management;  Laterality: Left;    RADIOFREQUENCY ABLATION Left 12/15/2020    Procedure: RADIOFREQUENCY ABLATION LEFT L2,3,4,5 1 of 2;  Surgeon: Lina Wagner MD;  Location: [facility] PAIN MGT;  Service: Pain Management;  Laterality: Left;    RADIOFREQUENCY ABLATION Right 12/29/2020    Procedure: RADIOFREQUENCY ABLATION RIGHT L2,3,4,5 2 of 2;  Surgeon: Lina Wagner MD;  Location: [facility] PAIN MGT;  Service: Pain Management;  Laterality: Right;    RADIOFREQUENCY ABLATION Left 6/29/2021    Procedure: RADIOFREQUENCY ABLATION GENICULAR COOLED;  Surgeon: Lina Wagner MD;  Location: [facility] PAIN MGT;  Service: Pain Management;  Laterality: Left;  1 of 2     RADIOFREQUENCY ABLATION Right 7/13/2021    Procedure: RADIOFREQUENCY ABLATION GENICULAR;  Surgeon: Lina Wagner MD;  Location: [facility] PAIN MGT;  Service: Pain Management;  Laterality: Right;  2 of 2    RADIOFREQUENCY ABLATION Right 8/24/2021    Procedure: RADIOFREQUENCY ABLATION, L2-L3-L4-L5 MEDIAL BRANCH 1 OF 2;  Surgeon: Lina Wagner MD;  Location: [facility] PAIN MGT;  Service: Pain Management;  Laterality: Right;    RADIOFREQUENCY ABLATION Left 9/11/2021    Procedure: RADIOFREQUENCY ABLATION, L2-L3-L4-L5 MEDIAL BR ANCH 2 OF 2;  Surgeon: Lina Wagner MD;  Location: [facility] PAIN MGT;  Service: Pain Management;  Laterality: Left;    RADIOFREQUENCY ABLATION Right 3/7/2022    Procedure: RADIOFREQUENCY ABLATION RIGHT L3,4,5 1 of 2, needs consent;  Surgeon: Israel Hurtado MD;  Location: [facility] PAIN MGT;  Service: Pain Management;  Laterality: Right;    RADIOFREQUENCY ABLATION Left 3/21/2022    Procedure: RADIOFREQUENCY ABLATION RIGHT L3,4,5 2 of 2, needs consent;  Surgeon: Israel Hurtado MD;  Location: [facility] PAIN MGT;  Service: Pain Management;  Laterality: Left;    RADIOFREQUENCY ABLATION Right 5/9/2022    Procedure: RADIOFREQUENCY ABLATION RIGHT GENICULAR COOLED 1 of 2;  Surgeon: Israel Hurtado MD;  Location: [facility] PAIN MGT;  Service: Pain Management;  Laterality: Right;    RADIOFREQUENCY ABLATION Left 5/23/2022    Procedure: RADIOFREQUENCY ABLATION LEFT GENICULAR COOLED  2 of 2;  Surgeon: Israel Hurtado MD;  Location: [facility] PAIN MGT;  Service: Pain Management;  Laterality: Left;    RADIOFREQUENCY ABLATION Right 12/12/2022    Procedure: RADIOFREQUENCY ABLATION RIGHT GENICULAR COOLED  ONE OF TWO  NEEDS CONSENT;  Surgeon: Israel Hurtado MD;  Location: [facility] PAIN MGT;  Service: Pain Management;  Laterality: Right;    RADIOFREQUENCY ABLATION Left 1/9/2023    Procedure: RADIOFREQUENCY ABLATION LEFT GENICULAR COOLED  TWO OF TWO NEEDS CONSENT;  Surgeon: Israel Hurtado MD;  Location: [facility] PAIN MGT;  Service: Pain Management;   Laterality: Left;    RADIOFREQUENCY ABLATION Right 3/6/2023    Procedure: RADIOFREQUENCY ABLATION RIGHT L3,L4,L5;  Surgeon: Israel Hurtado MD;  Location: [facility] PAIN MGT;  Service: Pain Management;  Laterality: Right;    RADIOFREQUENCY ABLATION Left 5/22/2023    Procedure: RADIOFREQUENCY ABLATION LEFT L3,L4,L5;  Surgeon: Israel Hurtado MD;  Location: [facility] PAIN MGT;  Service: Pain Management;  Laterality: Left;  4/3 LM TO R/S    RADIOFREQUENCY ABLATION Right 11/27/2023    Procedure: RADIOFREQUENCY ABLATION RIGHT L3, 4, 5 1 OF 3;  Surgeon: Israel Hurtado MD;  Location: [facility] PAIN MGT;  Service: Pain Management;  Laterality: Right;    RADIOFREQUENCY ABLATION OF LUMBAR MEDIAL BRANCH NERVE AT SINGLE LEVEL Left 6/26/2018    Procedure: RADIOFREQUENCY ABLATION, NERVE, MEDIAL BRANCH, LUMBAR, 1 LEVEL;  Surgeon: Lina Wagner MD;  Location: [facility] PAIN MGT;  Service: Pain Management;  Laterality: Left;  Left Cooled RFA @ L2,3,4,5  [id number]  with Sedation    1 of 2    RADIOFREQUENCY ABLATION OF LUMBAR MEDIAL BRANCH NERVE AT SINGLE LEVEL Right 7/10/2018    Procedure: RADIOFREQUENCY ABLATION, NERVE, MEDIAL BRANCH, LUMBAR, 1 LEVEL;  Surgeon: Lina Wagner MD;  Location: [facility] PAIN MGT;  Service: Pain Management;  Laterality: Right;  RIght Cooled RFA @ L2,3,4,5  [id number] with Sedation    2 of 2    TRIGGER FINGER RELEASE Right 2017    TRIGGER FINGER RELEASE Left 7/29/2019    Procedure: RELEASE, TRIGGER FINGER, Left Thumb;  Surgeon: Velma Garcia MD;  Location: [facility] OR;  Service: Orthopedics;  Laterality: Left;  Local w/ MAC     Social History     Tobacco Use    Smoking status: Never    Smokeless tobacco: Never   Substance Use Topics    Alcohol use: Not Currently     Comment: occasionally     Drug use: No     Review of patient's allergies indicates:   Allergen Reactions    Strawberry Anaphylaxis       Medications: I have reviewed the current medication administration record.    (Not in a hospital admission)      Review  of Systems   Constitutional:  Negative for fatigue.   HENT:  Negative for drooling and trouble swallowing.    Eyes:  Negative for visual disturbance.   Respiratory:  Negative for choking.    Cardiovascular:  Negative for palpitations.   Gastrointestinal:  Negative for nausea and vomiting.   Musculoskeletal:  Positive for neck pain. Negative for neck stiffness.   Skin:  Negative for color change.   Neurological:  Positive for speech difficulty, weakness, numbness and headaches.   Psychiatric/Behavioral:  Negative for confusion.    All other systems reviewed and are negative.    Objective:     Vital Signs (Most Recent):  Temp: 98.2 °F (36.8 °C) (01/04/24 1954)  Pulse: 67 (01/04/24 1954)  Resp: 20 (01/04/24 1954)  BP: (!) 156/85 (01/04/24 1954)  SpO2: 98 % (01/04/24 1954)    Vital Signs Range (Last 24H):  Temp:  [98.2 °F (36.8 °C)]   Pulse:  [67]   Resp:  [20]   BP: (156)/(85)   SpO2:  [98 %]        Physical Exam  Vitals and nursing note reviewed.   Constitutional:       General: She is not in acute distress.  HENT:      Head: Normocephalic.      Nose: Nose normal.      Mouth/Throat:      Pharynx: No oropharyngeal exudate or posterior oropharyngeal erythema.   Eyes:      Extraocular Movements: Extraocular movements intact.      Conjunctiva/sclera: Conjunctivae normal.   Cardiovascular:      Rate and Rhythm: Normal rate.   Pulmonary:      Effort: Pulmonary effort is normal. No respiratory distress.   Abdominal:      General: There is no distension.   Musculoskeletal:         General: No deformity or signs of injury.      Cervical back: Normal range of motion. No rigidity.   Skin:     General: Skin is warm and dry.   Neurological:      Mental Status: She is alert and oriented to person, place, and time.   Psychiatric:         Attention and Perception: Attention normal.         Behavior: Behavior normal. Behavior is cooperative.              Neurological Exam:   LOC: alert  Attention Span: Good   Language: No  "aphasia  Articulation: Dysarthria  Orientation: Person, Place, Time   Visual Fields: Full  EOM (CN III, IV, VI): Full/intact  Facial Sensation (CN V): Facial sensory loss  Facial Movement (CN VII): Lower facial weakness on the Left  Motor: Arm left  Paresis: 4/5  Leg left  Paresis: 4/5  Arm right  Normal 5/5  Leg right Normal 5/5  Sensation: Intact to light touch      Laboratory:  CMP: No results for input(s): "GLUCOSE", "CALCIUM", "ALBUMIN", "PROT", "NA", "K", "CO2", "CL", "BUN", "CREATININE", "ALKPHOS", "ALT", "AST", "BILITOT" in the last 24 hours.  CBC:   Recent Labs   Lab 01/04/24 2031   WBC 5.79   RBC 4.22   HGB 13.3   HCT 40.3      MCV 96   MCH 31.5*   MCHC 33.0     Lipid Panel: No results for input(s): "CHOL", "LDLCALC", "HDL", "TRIG" in the last 168 hours.  Coagulation: No results for input(s): "PT", "INR", "APTT" in the last 168 hours.  Hgb A1C: No results for input(s): "HGBA1C" in the last 168 hours.  TSH: No results for input(s): "TSH" in the last 168 hours.    Diagnostic Results:      Brain/Vessel Imaging:  CTA stroke multiphase 1/4/2024  Images personally reviewed and discussed with VN staff, Dr. Castaneda. No evidence of acute hemorrhage or LVO appreciated.      Christin Chambers PA-C  [facility]  Department of Vascular Neurology   [facility] - Emergency Dept       Involved in 45 minutes of critical care time and in-person contact during the following time: 1959 until 2044.    This patient has a critical neurological condition/illness, with high morbidity and mortality.  There is high probability for acute neurological change leading to clinical and possibly life-threatening deterioration requiring highest level of coordination and evaluation for urgent intervention.    Continuous in-person monitoring of the patient was done, including vital signs, neurological status, NIHSS or ICP monitoring with ongoing treatment changes based on patient need.    Care was coordinated with other " physicians/APPs involved in the patient's care.    Radiologic studies and laboratory data were reviewed and interpreted, and plan of care was re-assessed based on the results.    Diagnosis, treatment options and prognosis were discussed with the patient and/or family members or caregiver.

## 2024-01-05 NOTE — PROGRESS NOTES
[facility] - Neuro Critical Care  Neurocritical Care  Progress Note    Admit Date: 1/4/2024  Service Date: 01/05/2024  Length of Stay: 1    Subjective:     Chief Complaint: Acute right MCA stroke    History of Present Illness: Ms. Alivia Thomas is a 58 y.o. female with PMH of HTN, HLD, DM, morbid obesity, PADDY, depression, asthma, and GERD that presents to [facility] with acute onset mild R MCA syndrome. Early this evening, she experienced LUE, LLE weakness, mild L facial droop, mild dysarthria, and decreased sensation on L. She also notes associated HA and neck pain. LKN ~2.5 hrs PTA to [facility] ED. Initial NIHSS 4. CTH/CTA unremarkable without ICH or LVO. TNK given at 2035. She will be admitted to [facility] for close monitoring and observation post-TNK.    Hospital Course: 1/5/2024: Improving neurological exam overnight, pending MRI this afternoon.  Therapies ordered      Objective:     Vitals:  Temp: 98.4 °F (36.9 °C)  Pulse: 67  Rhythm: normal sinus rhythm  BP: 124/68  MAP (mmHg): 88  Resp: (!) 25  SpO2: 97 %    Temp  Min: 97.9 °F (36.6 °C)  Max: 98.4 °F (36.9 °C)  Pulse  Min: 64  Max: 90  BP  Min: 122/65  Max: 172/83  MAP (mmHg)  Min: 84  Max: 124  Resp  Min: 11  Max: 27  SpO2  Min: 93 %  Max: 100 %    01/04 0701 - 01/05 0700  In: -   Out: 800 [Urine:800]   Unmeasured Output  Urine Occurrence: 1  Pad Count: 2        Physical Exam  Constitutional:       Appearance: resting in bed in no distress, alert and well-nourished  Cardiovascular:      Rate and Rhythm: Normal rate and regular rhythm.  Pulses intact x4  Pulmonary:      Effort: Pulmonary effort is normal. No respiratory distress.   Abdominal:      Palpations: Abdomen is soft, non-tender   Neurological:   [id number]  Follows all commands and responds to questions appropriately, repetition and naming intact. Face symmetric and no dysarthria, EOMi, PERRL  Visual fields intact  5/5 motor score throughout without drift  Sensation intact to light touch  No dysmetria/ataxia to bedside  testing    NIHSS 0        Medications:  Continuous ScheduledallopurinoL, 300 mg, Q12H  atorvastatin, 40 mg, Daily  cyanocobalamin, 250 mcg, Daily  famotidine, 20 mg, BID  FLUoxetine, 20 mg, Daily  oxybutynin, 5 mg, Daily  senna-docusate 8.6-50 mg, 1 tablet, Daily    PRNacetaminophen, 650 mg, Q6H PRN  albuterol, 2 puff, Q6H PRN  dextrose 10%, 12.5 g, PRN  dextrose 10%, 25 g, PRN  diclofenac sodium, 2 g, QID PRN  glucagon (human recombinant), 1 mg, PRN  glucose, 16 g, PRN  glucose, 24 g, PRN  hydrALAZINE, 10 mg, Q4H PRN  insulin aspart U-100, 0-10 Units, QID (AC + HS) PRN  labetalol, 10 mg, Q4H PRN  sodium chloride 0.9%, 10 mL, PRN      Today I personally reviewed pertinent medications, lines/drains/airways, imaging, cardiology results, laboratory results, microbiology results,     Diet  Diet diabetic 2000 Calorie  Diet diabetic 2000 Calorie        Assessment/Plan:     Neuro  * Acute right MCA stroke  Antithrombotics for secondary stroke prevention: Antiplatelets: None: Hold all Antithrombotics x 24 hours after IV Thrombolytic therapy administration    Statins for secondary stroke prevention and hyperlipidemia, if present:   Statins: Atorvastatin- 40 mg daily    Aggressive risk factor modification: HTN, DM, HLD, Obesity     Rehab efforts: The patient has been evaluated by a stroke team provider and the therapy needs have been fully considered based off the presenting complaints and exam findings. The following therapy evaluations are needed: PT evaluate and treat, OT evaluate and treat, SLP evaluate and treat, PM&R evaluate for appropriate placement    Diagnostics ordered/pending: CT scan of head without contrast to asses brain parenchyma, CTA Neck/Arch to assess vasculature, HgbA1C to assess blood glucose levels, Lipid Profile to assess cholesterol levels, MRI head without contrast to assess brain parenchyma, TTE to assess cardiac function/status , TSH to assess thyroid function    VTE prophylaxis: Mechanical  prophylaxis: Place SCDs  None: Reason for No Pharmacological VTE Prophylaxis: Holding x 24 hours s/p treatment with Thrombolytic therapy    BP parameters: Infarct: Post Thrombolytic therapy, SBP <180        Chronic pain  Prescribed opioids at home  Resume as appropriate    Psychiatric  Recurrent major depressive disorder, in partial remission  Resume home prozac    Pulmonary  Mild intermittent asthma without complication  Home prn nebs resumed    Cardiac/Vascular  Hyperlipemia  Lipid panel completed  Statin ordered    Hematology  Received tissue plasminogen activator (t-PA) less than 24 hours prior to arrival  57 y/o F presenting with R MCA syndrome s/p tPA 1/4/2024 dose completed @20:30 per report. No LVO noted on CTA.     -Admitted to [facility], stroke following  -q1h neuro checks, vital checks for 24 hours per protocol  -EKG, ECHO, CXR completed  -A1c, TSH, lipid panel, coag panel  -Daily CBC, CMP, mag, phos  -SBP < 180, prn labetalol and hydralazine  -Statin  -SCD, Hold ACAP in post TNK setting x 24 hours  -PT/OT/SLP  -MRI pending    Endocrine  Type 2 diabetes mellitus without complication, without long-term current use of insulin  A1c 6.2%  BG goal 140-200  SSI, Accuchecks   Diabetic diet     Morbid obesity  Stroke risk factor  Nutrition consulted - ADA diet    GI  GERD (gastroesophageal reflux disease)  Prilosec at home  Replace with formulary ppi    Orthopedic  Gout  Last uric acid level WNL  Resume home allopurinol      Other  Impaired functional mobility, balance, gait, and endurance  Per prior outpatient PT note from 3 months ago     - repeat PT/OT assessments and treatment    PADDY (obstructive sleep apnea)  Confirm use of home CPAP and resume as appropriate          The patient is being Prophylaxed for:  Venous Thromboembolism with: Mechanical  Stress Ulcer with: PPI  Ventilator Pneumonia with: not applicable    Activity Orders            Diet diabetic 2000 Calorie: Diabetic starting at 01/05 0000    Turn  patient starting at 01/04 2200    Elevate HOB starting at 01/04 2032          Full Code    Dispo: ICU for 24 hours post-thrombolysis    Billing: Level 3    Ranjit Montes De Oca DO  Neurocritical Care  [facility] - Neuro Critical Care

## 2024-01-05 NOTE — ED PROVIDER NOTES
Encounter Date: 1/4/2024       History     Chief Complaint   Patient presents with    Facial Droop     L facial droop, slurred speech, L arm weakness and numbness, LKN 5:20pm     HPI  58-year-old female with past medical history as noted below coming in with acute onset slurred speech, left facial droop, left arm weakness.  She says her last known normal was around 5:40 p.m. she was awake and just said gotten out of a car.  Before that she had none of the symptoms.  Symptoms are as above.  She endorses a mild headache but otherwise no pain complaints including no chest pain, shortness of breath, abdominal pain.  She denies fever, cough, vomiting, diarrhea.  She denies similar symptoms prior.    Stroke code was activated from triage.    Review of patient's allergies indicates:   Allergen Reactions    Strawberry Anaphylaxis     Past Medical History:   Diagnosis Date    Acute right MCA stroke 1/4/2024    Allergy     Anemia     Asthma     Bilateral shoulder bursitis     Cervical stenosis of spine     Chronic pain     DDD (degenerative disc disease), cervical     Depression 01/28/2019    Diabetes mellitus type II     DJD (degenerative joint disease) of knee 06/19/2014    Facet arthritis of lumbar region 12/17/2015    Facet syndrome 12/17/2015    GERD (gastroesophageal reflux disease)     Heartburn     Hyperlipidemia     Hypertension     Morbid obesity     Neuromuscular disorder     Nuclear sclerotic cataract of left eye 8/8/2023    PADDY (obstructive sleep apnea)     Proteinuria     Right carpal tunnel syndrome     Sacroiliitis 06/13/2018    Sacroiliitis     Sleep apnea     Uterine fibroid      Past Surgical History:   Procedure Laterality Date    CARPAL TUNNEL RELEASE      CARPAL TUNNEL RELEASE  1980s    left    CARPAL TUNNEL RELEASE  2012    right    CATARACT EXTRACTION W/  INTRAOCULAR LENS IMPLANT Left 8/8/2023    Procedure: EXTRACTION, CATARACT, WITH IOL INSERTION;  Surgeon: Justin Block MD;  Location: [facility]  OR;  Service: Ophthalmology;  Laterality: Left;    CATARACT EXTRACTION W/  INTRAOCULAR LENS IMPLANT Right 8/29/2023    Procedure: EXTRACTION, CATARACT, WITH IOL INSERTION;  Surgeon: Justin Block MD;  Location: [facility] OR;  Service: Ophthalmology;  Laterality: Right;    COLONOSCOPY N/A 6/15/2020    Procedure: COLONOSCOPY;  Surgeon: Jc Koo MD;  Location: [facility] (4TH FLR);  Service: Endoscopy;  Laterality: N/A;  COVID screening on 6/13/20 at [facility]  pt updated on drop off location and no visitor [facility]-    EPIDURAL STEROID INJECTION N/A 7/24/2023    Procedure: INJECTION, STEROID, EPIDURAL, L5/S1 SOONER DATE;  Surgeon: Israel Hurtado MD;  Location: [facility] PAIN MGT;  Service: Pain Management;  Laterality: N/A;    ESOPHAGOGASTRODUODENOSCOPY N/A 3/27/2019    Procedure: EGD (ESOPHAGOGASTRODUODENOSCOPY);  Surgeon: Robbin Bar MD;  Location: [facility] (2ND FLR);  Service: Endoscopy;  Laterality: N/A;  BMI 61.3/2nd floor case/svn    INJECTION OF JOINT Bilateral 6/9/2020    Procedure: INJECTION, JOINT, BILATERAL SI;  Surgeon: Lina Wagner MD;  Location: [facility] PAIN MGT;  Service: Pain Management;  Laterality: Bilateral;  B/L SI Joint Injection    INJECTION OF JOINT Bilateral 5/11/2021    Procedure: INJECTION, JOINT, SACROILIAC (SI) need consent;  Surgeon: Lina Wagner MD;  Location: [facility] PAIN MGT;  Service: Pain Management;  Laterality: Bilateral;    JOINT REPLACEMENT Bilateral     with 2 revisions on rt    KNEE SURGERY  3/2010    orthroscope    KNEE SURGERY  6-19-14    left TKR    LAPAROSCOPIC SLEEVE GASTRECTOMY N/A 8/28/2019    Procedure: GASTRECTOMY, SLEEVE, LAPAROSCOPIC with intraop EGD;  Surgeon: Heriberto Clements MD;  Location: [facility] 2ND [facility];  Service: General;  Laterality: N/A;    PANNICULECTOMY N/A 6/14/2022    Procedure: PANNICULECTOMY;  Surgeon: Rhett Gray MD;  Location: [facility] OR 2ND [facility];  Service: Plastics;  Laterality: N/A;    RADIOFREQUENCY ABLATION Right  7/9/2019    Procedure: RADIOFREQUENCY ABLATION;  Surgeon: Lina Wagner MD;  Location: [facility] PAIN MGT;  Service: Pain Management;  Laterality: Right;  RIGHT RFA L2,3,4,5  2 of 2    RADIOFREQUENCY ABLATION Left 12/19/2019    Procedure: RADIOFREQUENCY ABLATION, LEFT L2-L3-L4-L5 MEDIAL BRANCH 1 OF 2;  Surgeon: Lina Wagner MD;  Location: [facility] PAIN MGT;  Service: Pain Management;  Laterality: Left;    RADIOFREQUENCY ABLATION Right 12/31/2019    Procedure: RADIOFREQUENCY ABLATION, RIGHT L2-L3-L4-L5  2 OF 2;  Surgeon: Lina Wagner MD;  Location: [facility]IN [facility]GT;  Service: Pain Management;  Laterality: Right;    RADIOFREQUENCY ABLATION Right 10/13/2020    Procedure: RADIOFREQUENCY ABLATION, Genicular Cooled 1 of 2;  Surgeon: Lina Wagner MD;  Location: [facility]IN [facility]GT;  Service: Pain Management;  Laterality: Right;    RADIOFREQUENCY ABLATION Left 11/3/2020    Procedure: RADIOFREQUENCY ABLATION, GENICULAR COOLED  2 OF 2;  Surgeon: Lina Wagner MD;  Location: [facility] PAIN MGT;  Service: Pain Management;  Laterality: Left;    RADIOFREQUENCY ABLATION Left 12/15/2020    Procedure: RADIOFREQUENCY ABLATION LEFT L2,3,4,5 1 of 2;  Surgeon: Lina Wagner MD;  Location: [facility] PAIN MGT;  Service: Pain Management;  Laterality: Left;    RADIOFREQUENCY ABLATION Right 12/29/2020    Procedure: RADIOFREQUENCY ABLATION RIGHT L2,3,4,5 2 of 2;  Surgeon: Lina Wagner MD;  Location: [facility] PAIN MGT;  Service: Pain Management;  Laterality: Right;    RADIOFREQUENCY ABLATION Left 6/29/2021    Procedure: RADIOFREQUENCY ABLATION GENICULAR COOLED;  Surgeon: Lina Wagner MD;  Location: [facility] PAIN MGT;  Service: Pain Management;  Laterality: Left;  1 of 2    RADIOFREQUENCY ABLATION Right 7/13/2021    Procedure: RADIOFREQUENCY ABLATION GENICULAR;  Surgeon: Lina Wagner MD;  Location: [facility];  Service: Pain Management;  Laterality: Right;  2 of 2    RADIOFREQUENCY ABLATION Right 8/24/2021    Procedure:  RADIOFREQUENCY ABLATION, L2-L3-L4-L5 MEDIAL BRANCH 1 OF 2;  Surgeon: Lina Wagner MD;  Location: [facility] PAIN MGT;  Service: Pain Management;  Laterality: Right;    RADIOFREQUENCY ABLATION Left 9/11/2021    Procedure: RADIOFREQUENCY ABLATION, L2-L3-L4-L5 MEDIAL BR ANCH 2 OF 2;  Surgeon: Lina Wagner MD;  Location: [facility] PAIN MGT;  Service: Pain Management;  Laterality: Left;    RADIOFREQUENCY ABLATION Right 3/7/2022    Procedure: RADIOFREQUENCY ABLATION RIGHT L3,4,5 1 of 2, needs consent;  Surgeon: Israel Hurtado MD;  Location: [facility] PAIN MGT;  Service: Pain Management;  Laterality: Right;    RADIOFREQUENCY ABLATION Left 3/21/2022    Procedure: RADIOFREQUENCY ABLATION RIGHT L3,4,5 2 of 2, needs consent;  Surgeon: Israel Hurtado MD;  Location: [facility] PAIN MGT;  Service: Pain Management;  Laterality: Left;    RADIOFREQUENCY ABLATION Right 5/9/2022    Procedure: RADIOFREQUENCY ABLATION RIGHT GENICULAR COOLED 1 of 2;  Surgeon: Israel Hurtado MD;  Location: [facility] PAIN MGT;  Service: Pain Management;  Laterality: Right;    RADIOFREQUENCY ABLATION Left 5/23/2022    Procedure: RADIOFREQUENCY ABLATION LEFT GENICULAR COOLED  2 of 2;  Surgeon: Israel Hurtado MD;  Location: [facility] PAIN MGT;  Service: Pain Management;  Laterality: Left;    RADIOFREQUENCY ABLATION Right 12/12/2022    Procedure: RADIOFREQUENCY ABLATION RIGHT GENICULAR COOLED  ONE OF TWO  NEEDS CONSENT;  Surgeon: Israel Hurtado MD;  Location: [facility] PAIN MGT;  Service: Pain Management;  Laterality: Right;    RADIOFREQUENCY ABLATION Left 1/9/2023    Procedure: RADIOFREQUENCY ABLATION LEFT GENICULAR COOLED  TWO OF TWO NEEDS CONSENT;  Surgeon: Israel Hurtado MD;  Location: [facility] PAIN MGT;  Service: Pain Management;  Laterality: Left;    RADIOFREQUENCY ABLATION Right 3/6/2023    Procedure: RADIOFREQUENCY ABLATION RIGHT L3,L4,L5;  Surgeon: Israel Hurtado MD;  Location: [facility] PAIN MGT;  Service: Pain Management;  Laterality: Right;    RADIOFREQUENCY ABLATION Left 5/22/2023    Procedure:  RADIOFREQUENCY ABLATION LEFT L3,L4,L5;  Surgeon: Israel Hurtado MD;  Location: [facility] PAIN MGT;  Service: Pain Management;  Laterality: Left;  4/3 LM TO R/S    RADIOFREQUENCY ABLATION Right 11/27/2023    Procedure: RADIOFREQUENCY ABLATION RIGHT L3, 4, 5 1 OF 3;  Surgeon: Israel Hurtado MD;  Location: [facility] PAIN MGT;  Service: Pain Management;  Laterality: Right;    RADIOFREQUENCY ABLATION OF LUMBAR MEDIAL BRANCH NERVE AT SINGLE LEVEL Left 6/26/2018    Procedure: RADIOFREQUENCY ABLATION, NERVE, MEDIAL BRANCH, LUMBAR, 1 LEVEL;  Surgeon: Lina Wagner MD;  Location: [facility] PAIN MGT;  Service: Pain Management;  Laterality: Left;  Left Cooled RFA @ L2,3,4,5  [id number]  with Sedation    1 of 2    RADIOFREQUENCY ABLATION OF LUMBAR MEDIAL BRANCH NERVE AT SINGLE LEVEL Right 7/10/2018    Procedure: RADIOFREQUENCY ABLATION, NERVE, MEDIAL BRANCH, LUMBAR, 1 LEVEL;  Surgeon: Lina Wagner MD;  Location: [facility] PAIN MGT;  Service: Pain Management;  Laterality: Right;  RIght Cooled RFA @ L2,3,4,5  [id number] with Sedation    2 of 2    TRIGGER FINGER RELEASE Right 2017    TRIGGER FINGER RELEASE Left 7/29/2019    Procedure: RELEASE, TRIGGER FINGER, Left Thumb;  Surgeon: Velma Garcia MD;  Location: [facility] OR;  Service: Orthopedics;  Laterality: Left;  Local w/ MAC     Family History   Problem Relation Age of Onset    Diabetes Mother     Cataracts Mother     Diabetes Father     Cataracts Father     Hypertension Sister     Diabetes Sister     No Known Problems Sister     Cancer Sister         lymphoma     Coronary artery disease Brother     Diabetes Brother     Diabetes Brother     Hypotension Brother     Kidney failure Brother     Hypotension Brother     Amblyopia Neg Hx     Blindness Neg Hx     Glaucoma Neg Hx     Macular degeneration Neg Hx     Retinal detachment Neg Hx     Strabismus Neg Hx     Stroke Neg Hx     Thyroid disease Neg Hx     Anesthesia problems Neg Hx      Social History     Tobacco Use    Smoking status:  Never    Smokeless tobacco: Never   Substance Use Topics    Alcohol use: Not Currently     Comment: occasionally     Drug use: No     Review of Systems    Physical Exam     Initial Vitals [01/04/24 1954]   BP Pulse Resp Temp SpO2   (!) 156/85 67 20 98.2 °F (36.8 °C) 98 %      MAP       --         Physical Exam    Physical Exam:  CONSTITUTIONAL: Well developed, well nourished, in no acute distress.  HENT: Normocephalic, atraumatic    EYES: Sclerae anicteric   NECK: Supple, no thyroid enlargement  CARDIOVASCULAR: Regular rate and rhythm without any murmurs, gallops, rubs.  RESPIRATORY: Speaking in full sentences. Breathing comfortably. Auscultation of the lungs revealed normal breath sounds b/l, no wheezing, no rales, no rhonchi.  ABDOMEN: Soft and nontender, no masses, no rebound or guarding   NEUROLOGIC: Alert, interacting normally.  Left-sided facial droop, marked pronator drift/left arm weakness.  Speech is mildly slurred.  MSK: Moving all four extremities.  Skin: Warm and dry. No visible rash on exposed areas of skin.    Psych:  Flat affect      ED Course   Procedures  Labs Reviewed   CBC W/ AUTO DIFFERENTIAL - Abnormal; Notable for the following components:       Result Value    MCH 31.5 (*)     All other components within normal limits    Narrative:     Release to patient->Immediate   COMPREHENSIVE METABOLIC PANEL - Abnormal; Notable for the following components:    BUN 26 (*)     All other components within normal limits    Narrative:     Release to patient->Immediate   LIPID PANEL - Abnormal; Notable for the following components:    Cholesterol 225 (*)     All other components within normal limits    Narrative:     Release to patient->Immediate   ISTAT PROCEDURE - Abnormal; Notable for the following components:    POC PO2 105 (*)     All other components within normal limits   HIV 1 / 2 ANTIBODY    Narrative:     Release to patient->Immediate   HEPATITIS C ANTIBODY    Narrative:     Release to  patient->Immediate   PROTIME-INR    Narrative:     Release to patient->Immediate   TSH    Narrative:     Release to patient->Immediate   TROPONIN I   POCT GLUCOSE, HAND-HELD DEVICE   POCT GLUCOSE   ISTAT PROCEDURE   POCT GLUCOSE MONITORING CONTINUOUS          Imaging Results              CTA STROKE MULTI-PHASE (Final result)  Result time 01/04/24 22:19:48      Final result by Theodore Gomez MD (01/04/24 22:19:48)                   Impression:      No acute abnormality. No high-grade stenosis or major vessel occlusion. If the patient has an acute, focal neurological deficit, MRI of the brain may be indicated.    Electronically signed by resident: Brianda Avendano  Date:    01/04/2024  Time:    20:40    Electronically signed by: Theodore Gomez MD  Date:    01/04/2024  Time:    22:19               Narrative:    EXAMINATION:  CTA STROKE MULTI-PHASE    CLINICAL HISTORY:  Neuro deficit, acute, stroke suspected;    TECHNIQUE:  Non contrast low dose axial images were obtained thought the head. CT angiogram was performed from the level of the hollis to the top of the head following the IV administration of 100 mL of Omnipaque 350.   Sagittal and coronal reconstructions and maximum intensity projection reconstructions were performed. Arterial stenosis percentages are based on NASCET measurement criteria.  Two additional phases of immediate post-contrast CTA images were performed through the head alone.    COMPARISON:  CT orbits 08/02/2012    FINDINGS:  Intracranial Compartment:    Ventricles and sulci are normal in size for age without evidence of hydrocephalus. No extra-axial blood or fluid collections.    The brain parenchyma appears normal. No parenchymal mass, hemorrhage, edema, or major vascular distribution infarct.    Skull/Extracranial Contents (limited evaluation): No fracture. Mastoid air cells and paranasal sinuses are essentially clear.  Bilateral pseudophakia.    Non-Vascular Structures of the  Neck/Thoracic Inlet (limited evaluation): Right thyroid lobe hypodense nodule.  Degenerative changes in the spine, most pronounced at C5-C6 with mild-to-moderate central canal stenosis and bilateral neural foraminal narrowing.    Aorta: Normal 3 vessel arch.    Extracranial carotid circulation: No hemodynamically significant stenosis, aneurysmal dilatation, or dissection.    Extracranial vertebral circulation: No hemodynamically significant stenosis, aneurysmal dilatation, or dissection.  Right dominant vertebral artery.    Intracranial Arteries: No focal high-grade stenosis, occlusion, or aneurysm.    Venous structures (limited evaluation): Normal.                                       Medications   sodium chloride 0.9% flush 10 mL (has no administration in time range)   atorvastatin tablet 40 mg (has no administration in time range)   albuterol inhaler 2 puff (has no administration in time range)   FLUoxetine capsule 20 mg (has no administration in time range)   famotidine tablet 20 mg (20 mg Oral Given 1/4/24 2222)   allopurinoL tablet 300 mg (has no administration in time range)   cyanocobalamin tablet 250 mcg (has no administration in time range)   senna-docusate 8.6-50 mg per tablet 1 tablet (has no administration in time range)   acetaminophen tablet 650 mg (650 mg Oral Given 1/5/24 0006)   potassium bicarbonate disintegrating tablet 50 mEq (has no administration in time range)   potassium bicarbonate disintegrating tablet 35 mEq (has no administration in time range)   potassium bicarbonate disintegrating tablet 60 mEq (has no administration in time range)   magnesium oxide tablet 800 mg (has no administration in time range)   magnesium oxide tablet 800 mg (has no administration in time range)   potassium, sodium phosphates 280-160-250 mg packet 2 packet (has no administration in time range)   potassium, sodium phosphates 280-160-250 mg packet 2 packet (has no administration in time range)   potassium, sodium  phosphates 280-160-250 mg packet 2 packet (has no administration in time range)   hydrALAZINE injection 10 mg (has no administration in time range)   labetalol 20 mg/4 mL (5 mg/mL) IV syring (has no administration in time range)   glucose chewable tablet 16 g (has no administration in time range)   glucose chewable tablet 24 g (has no administration in time range)   glucagon (human recombinant) injection 1 mg (has no administration in time range)   insulin aspart U-100 pen 0-10 Units (has no administration in time range)   dextrose 10% bolus 125 mL 125 mL (has no administration in time range)   dextrose 10% bolus 250 mL 250 mL (has no administration in time range)   iohexoL (OMNIPAQUE 350) injection 100 mL (100 mLs Intravenous Given 1/4/24 2020)   tenecteplase (TNKase) IV KIT 25 mg (25 mg Intravenous Given 1/4/24 2035)     Followed by   sodium chloride 0.9% flush 10 mL (10 mLs Intravenous Given 1/4/24 2035)     Medical Decision Making  Amount and/or Complexity of Data Reviewed  Labs: ordered.  Radiology: ordered.    Risk  Prescription drug management.  Decision regarding hospitalization.      58-year-old female with past history as noted, the acute onset left facial weakness, left arm weakness, slurred speech.  Stroke team promptly at bedside, evaluating patient.    Glucose is 88    CT a negative per vascular Neurology for bleed.  Imaging is unavailable yet to me.  They are giving TNK.  Will be admitted to neuro critical care.    Update:  Patient re-evaluated and strength is significantly improved as well as facial droop after TNK.  She continues to have no other complaints.  Labs as noted.  Admitted so Neuro Critical Care.    Critical Care Time:     The high probability of sudden, clinically significant deterioration in the patient's condition required the highest level of my preparedness to intervene urgently.    Services included the following: chart data review, reviewing nursing notes and/or old charts,  documentation time, consultant collaboration regarding findings and treatment options, medication orders and management, direct patient care, vital sign assessments and ordering, interpreting and reviewing diagnostic studies/lab tests.     I spent 30 minutes on total Critical Care time, which includes only time during which I was engaged in work directly related to the patient's care, as described above, whether at the bedside or elsewhere in the Emergency Department.  It did not include time spent on separately billable procedures nor did it include the time spent by residents, students, nurses or physician assistants on this patient's care.    Critical Care was needed secondary to the following conditions:  Acute neurologic deficit consistent with likely stroke.  Stroke code activation.                                      Clinical Impression:  Final diagnoses:  [R29.818] Acute focal neurological deficit          ED Disposition Condition    Admit                 Anders Antonio MD  01/05/24 0144

## 2024-01-05 NOTE — ASSESSMENT & PLAN NOTE
Last uric acid level WNL  Resume home allopurinol     Detail Level: Detailed Patient will call clinic if he wants Imiquimod cream sent to his pharmacy. Patient will return to clinic in 3 weeks for ln2\\nPatient wanted lab test ordered for std testing\\n\\nPatient had excision done on left superior lateral buccal cheek, was given celacyn sample for scarring

## 2024-01-05 NOTE — ASSESSMENT & PLAN NOTE
Antithrombotics for secondary stroke prevention: Antiplatelets: None: Hold all Antithrombotics x 24 hours after IV Thrombolytic therapy administration    Statins for secondary stroke prevention and hyperlipidemia, if present:   Statins: Atorvastatin- 40 mg daily    Aggressive risk factor modification: HTN, DM, HLD, Obesity     Rehab efforts: The patient has been evaluated by a stroke team provider and the therapy needs have been fully considered based off the presenting complaints and exam findings. The following therapy evaluations are needed: PT evaluate and treat, OT evaluate and treat, SLP evaluate and treat, PM&R evaluate for appropriate placement    Diagnostics ordered/pending: CT scan of head without contrast to asses brain parenchyma, CTA Neck/Arch to assess vasculature, HgbA1C to assess blood glucose levels, Lipid Profile to assess cholesterol levels, MRI head without contrast to assess brain parenchyma, TTE to assess cardiac function/status , TSH to assess thyroid function    VTE prophylaxis: Mechanical prophylaxis: Place SCDs  None: Reason for No Pharmacological VTE Prophylaxis: Holding x 24 hours s/p treatment with Thrombolytic therapy    BP parameters: Infarct: Post Thrombolytic therapy, SBP <180

## 2024-01-05 NOTE — PLAN OF CARE
[facility] - Neuro Critical Care  Initial Discharge Assessment       Primary Care Provider: Clarisse Richardson MD    Admission Diagnosis: Stroke [I63.9]  Acute focal neurological deficit [R29.818]    Admission Date: 1/4/2024  Expected Discharge Date:     Transition of Care Barriers: (P) None    Payor: MEDICARE / Plan: MEDICARE PART A & B / Product Type: Government /     Extended Emergency Contact Information  Primary Emergency Contact: Ba Sanders  Address: [address] [place] [address] [place]  Mobile Phone: [phone]  Relation: Spouse  Preferred language: English  Secondary Emergency Contact: Memo Mueller  Address: [address] [facility] [place] [address] [facility]  Home Phone: [phone]  Mobile Phone: [phone]  Relation: Sister  Preferred language: English    Discharge Plan A: (P) Home with family  Discharge Plan B: (P) Home Health      [facility] [place] - [address]  [address]  [facility] [address]  Phone: [phone] Fax: [phone]      Initial Assessment (most recent)       Adult Discharge Assessment - 01/05/24 1116          Discharge Assessment    Assessment Type Discharge Planning Assessment (P)      Confirmed/corrected address, phone number and insurance Yes (P)      Confirmed Demographics Correct on Facesheet (P)      Source of Information family (P)      Communicated RYAN with patient/caregiver Date not available/Unable to determine (P)      Reason For Admission Acute right MCA stroke (P)      People in Home spouse (P)      Facility Arrived From: Home (P)      Do you expect to return to your current living situation? Yes (P)      Do you have help at home or someone to help you manage your care at home? Yes (P)      Who are your caregiver(s) and their phone number(s)? Spouse Ba Sanders [phone] (P)      Prior to hospitilization cognitive status: Alert/Oriented (P)       Current cognitive status: Alert/Oriented (P)      Walking or Climbing Stairs Difficulty no (P)      Dressing/Bathing Difficulty no (P)      Home Accessibility not wheelchair accessible (P)      Equipment Currently Used at Home none (P)    Pt owns a RW, stright cane, BSC and tub bench, but doesn't use them at this time.    Readmission within 30 days? No (P)      Patient currently being followed by outpatient case management? No (P)      Do you currently have service(s) that help you manage your care at home? No (P)      Do you take prescription medications? Yes (P)      Do you have prescription coverage? Yes (P)      Coverage Medicare (P)      Do you have any problems affording any of your prescribed medications? No (P)      Is the patient taking medications as prescribed? yes (P)      Who is going to help you get home at discharge? Spouse Ba Sanders [phone] (P)      How do you get to doctors appointments? car, drives self;family or friend will provide (P)      Are you on dialysis? No (P)      Do you take coumadin? No (P)      Discharge Plan A Home with family (P)      Discharge Plan B Home Health (P)      DME Needed Upon Discharge  none (P)      Discharge Plan discussed with: Patient (P)      Transition of Care Barriers None (P)         OTHER    Name(s) of People in Home Spouse Ba Sanders [phone] (P)                    Discharge Plan A and Plan B have been determined by review of patient's clinical status, future medical and therapeutic needs, and coverage/benefits for post-acute care in coordination with multidisciplinary team members.    Pt lives in a one story home with 1 MATTIE. Pt was independent with ADL's prior to admission and currently uses no DME, but owns DME listed above. She is not on dialysis or Coumadin. If HH is ordered, Pt prefers to use [facility]. Pt has transportation home with family. SW will follow for all discharge planning needs.     A. Cate Malone, LMSW [facility]  [facility]  [id number]

## 2024-01-05 NOTE — PROGRESS NOTES
Patient arrived to [facility] from ED    Report received from: ED RNAnita    Type of stroke/diagnosis:  R MCA    Tenecteplase start and end time (if applicable) 2035    Thrombectomy start and end time (if applicable)    Current symptoms: slight headache,AOx4, spontaneous movement on all extremities, symmetrical facial movement     Skin Assessment done: Y  Wounds noted: N/A  *If wounds noted, was Wound Care consulted? N  *If wounds noted, LDA placed? N  Skin Assessment Verified by:  DARIUSZ Allen Completed? Y    Patient Belongings on Admit: Nike slides, fur blanket,blue shirt, grey sweatshirt, sweatpants, , underwear, grey undershirt, thermal shirt, smartphone, purse, glasses     NCC notified: DENG Tinsley

## 2024-01-05 NOTE — PT/OT/SLP EVAL
"Occupational Therapy   Evaluation    Name: Alivia Thomas  MRN: [id number]  Admitting Diagnosis: Acute right MCA stroke  Recent Surgery: * No surgery found *      Recommendations:     Discharge Recommendations: No Therapy Indicated  Discharge Equipment Recommendations:  none  Barriers to discharge:  None    Assessment:     Alivia Thomas is a 58 y.o. female with a medical diagnosis of Acute right MCA stroke.  She presents with performance deficits affecting function: impaired functional mobility, impaired self care skills.      Rehab Prognosis: Good; patient would benefit from acute skilled OT services to address these deficits and reach maximum level of function.       Plan:     Patient to be seen 3 x/week to address the above listed problems via self-care/home management, therapeutic activities, neuromuscular re-education  Plan of Care Expires: 02/03/24  Plan of Care Reviewed with: patient    Subjective     Patient:  "My left arm got tingling and my head started hurting.  Then my arm went limp and I couldn't control it.  My speech got slurred."    Occupational Profile:  Patient resides in [place] with her wife in one story home with no steps to enter.  Patient is right handed.  PTA patient independent with ADLs including driving.  Unemployed.  Currently owns no DME.  Hobbies: TV, movies, spending time with family and friends.    Pain/Comfort:  Pain Rating 1: 0/10  Pain Rating Post-Intervention 1: 0/10    Patients cultural, spiritual, Caodaism conflicts given the current situation: no    Objective:     Communicated with: Nurse prior to session.  Patient found supine with bed alarm, telemetry, SCD, peripheral IV, blood pressure cuff upon OT entry to room.    General Precautions: Standard, aspiration, fall  Orthopedic Precautions: N/A  Braces: N/A  Respiratory Status: Room air    Occupational Performance:    Bed Mobility:    Patient completed Rolling/Turning to Left with  supervision  Patient completed " Rolling/Turning to Right with supervision  Patient completed Supine to Sit with supervision  Patient completed Sit to Supine with supervision    Functional Mobility/Transfers:  Patient completed Sit <> Stand Transfer with stand by assistance  with  no assistive device   Patient completed Bed <> Chair Transfer using Stand Pivot technique with stand by assistance with no assistive device    Activities of Daily Living:  Feeding:  modified independence    Grooming: supervision    Upper Body Dressing: supervision    Lower Body Dressing: minimum assistance with donning right sock    Cognitive/Visual Perceptual:  Cognitive/Psychosocial Skills:     -       Oriented to: Person, Place, and Time   -       Follows Commands/attention:Follows two-step commands  -       Communication: clear/fluent    Physical Exam:  Postural examination/scapula alignment:    -       Rounded shoulders  Upper Extremity Range of Motion:     -       Right Upper Extremity: WNL  -       Left Upper Extremity: WNL  Upper Extremity Strength:    -       Right Upper Extremity: WNL  -       Left Upper Extremity: WNL    AMPAC 6 Click ADL:  AMPAC Total Score: 18    Treatment & Education:  Patient/ Family education provided for stroke warning signs, prevention guidelines and personal risk factors.  Patient/ Family verbalizing understanding via teach back method. Patient education provided on role of OT.  Patient alert and oriented x 3; able to follow 4/4 one step commands.  Patient attentive and interactive throughout the session.  Continued education, patient/ family training recommended.  Session was conducted separately from PT session to give more opportunities to mobilize throughout the day.     Patient left supine with all lines intact, call button in reach, and bed alarm on    GOALS:   Multidisciplinary Problems       Occupational Therapy Goals          Problem: Occupational Therapy    Goal Priority Disciplines Outcome Interventions   Occupational Therapy  Goal     OT, PT/OT Ongoing, Progressing    Description: Goals set 1/5 to be addressed with expiration date, 2/3:  Patient will increase functional independence with ADLs by performing:    Patient will demonstrate rolling to the right with modified independence.  Not met   Patient will demonstrate rolling to the left with modified independence.   Not met  Patient will demonstrate supine -sit with modified independence.   Not met  Patient will demonstrate stand pivot transfers with modified independence.   Not met  Patient will demonstrate grooming while standing with modified independence.   Not met  Patient will demonstrate upper body dressing with modified independence while seated EOB.   Not met  Patient will demonstrate lower body dressing with modified independence while seated EOB.   Not met  Patient will demonstrate toileting with modified independence.   Not met  Patient will demonstrate bathing while seated EOB with modified independence.   Not met  Patient's family / caregiver will demonstrate independence and safety with assisting patient with self-care skills and functional mobility.     Not met  Patient and/or patient's family will verbalize understanding of stroke prevention guidelines, personal risk factors and stroke warning signs via teachback method.  Not met                                  History:     Past Medical History:   Diagnosis Date    Acute right MCA stroke 1/4/2024    Allergy     Anemia     Asthma     Bilateral shoulder bursitis     Cervical stenosis of spine     Chronic pain     DDD (degenerative disc disease), cervical     Depression 01/28/2019    Diabetes mellitus type II     DJD (degenerative joint disease) of knee 06/19/2014    Facet arthritis of lumbar region 12/17/2015    Facet syndrome 12/17/2015    GERD (gastroesophageal reflux disease)     Heartburn     Hyperlipidemia     Hypertension     Morbid obesity     Neuromuscular disorder     Nuclear sclerotic cataract of left eye  8/8/2023    PADDY (obstructive sleep apnea)     Proteinuria     Right carpal tunnel syndrome     Sacroiliitis 06/13/2018    Sacroiliitis     Sleep apnea     Uterine fibroid          Past Surgical History:   Procedure Laterality Date    CARPAL TUNNEL RELEASE      CARPAL TUNNEL RELEASE  1980s    left    CARPAL TUNNEL RELEASE  2012    right    CATARACT EXTRACTION W/  INTRAOCULAR LENS IMPLANT Left 8/8/2023    Procedure: EXTRACTION, CATARACT, WITH IOL INSERTION;  Surgeon: Justin Block MD;  Location: [facility] OR;  Service: Ophthalmology;  Laterality: Left;    CATARACT EXTRACTION W/  INTRAOCULAR LENS IMPLANT Right 8/29/2023    Procedure: EXTRACTION, CATARACT, WITH IOL INSERTION;  Surgeon: Justin Block MD;  Location: [facility] OR;  Service: Ophthalmology;  Laterality: Right;    COLONOSCOPY N/A 6/15/2020    Procedure: COLONOSCOPY;  Surgeon: Jc Koo MD;  Location: [facility] (4TH FLR);  Service: Endoscopy;  Laterality: N/A;  COVID screening on 6/13/20 at [facility]  pt updated on drop off location and no visitor [facility]    EPIDURAL STEROID INJECTION N/A 7/24/2023    Procedure: INJECTION, STEROID, EPIDURAL, L5/S1 SOONER DATE;  Surgeon: Israel Hurtado MD;  Location: [facility] PAIN MGT;  Service: Pain Management;  Laterality: N/A;    ESOPHAGOGASTRODUODENOSCOPY N/A 3/27/2019    Procedure: EGD (ESOPHAGOGASTRODUODENOSCOPY);  Surgeon: Robbin Bar MD;  Location: [facility] (2ND FLR);  Service: Endoscopy;  Laterality: N/A;  BMI 61.3/2nd floor case/svn    INJECTION OF JOINT Bilateral 6/9/2020    Procedure: INJECTION, JOINT, BILATERAL SI;  Surgeon: Lina Wagner MD;  Location: [facility] PAIN MGT;  Service: Pain Management;  Laterality: Bilateral;  B/L SI Joint Injection    INJECTION OF JOINT Bilateral 5/11/2021    Procedure: INJECTION, JOINT, SACROILIAC (SI) need consent;  Surgeon: Lina Wagner MD;  Location: [facility] PAIN MGT;  Service: Pain Management;  Laterality: Bilateral;    JOINT REPLACEMENT Bilateral      with 2 revisions on rt    KNEE SURGERY  3/2010    orthroscope    KNEE SURGERY  6-19-14    left TKR    LAPAROSCOPIC SLEEVE GASTRECTOMY N/A 8/28/2019    Procedure: GASTRECTOMY, SLEEVE, LAPAROSCOPIC with intraop EGD;  Surgeon: Heriberto Clements MD;  Location: [facility] OR 2ND FLR;  Service: General;  Laterality: N/A;    PANNICULECTOMY N/A 6/14/2022    Procedure: PANNICULECTOMY;  Surgeon: Rhett Gray MD;  Location: [facility] OR 2ND FLR;  Service: Plastics;  Laterality: N/A;    RADIOFREQUENCY ABLATION Right 7/9/2019    Procedure: RADIOFREQUENCY ABLATION;  Surgeon: Lina Wagner MD;  Location: [facility];  Service: Pain Management;  Laterality: Right;  RIGHT RFA L2,3,4,5  2 of 2    RADIOFREQUENCY ABLATION Left 12/19/2019    Procedure: RADIOFREQUENCY ABLATION, LEFT L2-L3-L4-L5 MEDIAL BRANCH 1 OF 2;  Surgeon: Lina Wagner MD;  [facility];  Service: Pain Management;  Laterality: Left;    RADIOFREQUENCY ABLATION Right 12/31/2019    Procedure: RADIOFREQUENCY ABLATION, RIGHT L2-L3-L4-L5  2 OF 2;  Surgeon: Lina Wagner MD;  Location: [facility];  Service: Pain Management;  Laterality: Right;    RADIOFREQUENCY ABLATION Right 10/13/2020    Procedure: RADIOFREQUENCY ABLATION, Genicular Cooled 1 of 2;  Surgeon: Lina Wagner MD;  Location: [facility];  Service: Pain Management;  Laterality: Right;    RADIOFREQUENCY ABLATION Left 11/3/2020    Procedure: RADIOFREQUENCY ABLATION, GENICULAR COOLED  2 OF 2;  Surgeon: Lina Wagner MD;  [facility]: [facility];  Service: Pain Management;  Laterality: Left;    RADIOFREQUENCY ABLATION Left 12/15/2020    Procedure: RADIOFREQUENCY ABLATION LEFT L2,3,4,5 1 of 2;  Surgeon: Lina Wagner MD;  Location: [facility];  Service: Pain Management;  Laterality: Left;    RADIOFREQUENCY ABLATION Right 12/29/2020    Procedure: RADIOFREQUENCY ABLATION RIGHT L2,3,4,5 2 of 2;  Surgeon: Lina Wagner MD;  Location: [facility];  Service:  Pain Management;  Laterality: Right;    RADIOFREQUENCY ABLATION Left 6/29/2021    Procedure: RADIOFREQUENCY ABLATION GENICULAR COOLED;  Surgeon: Lina Wagner MD;  Location: [facility] PAIN MGT;  Service: Pain Management;  Laterality: Left;  1 of 2    RADIOFREQUENCY ABLATION Right 7/13/2021    Procedure: RADIOFREQUENCY ABLATION GENICULAR;  Surgeon: Lina Wagner MD;  [facility];  Service: Pain Management;  Laterality: Right;  2 of 2    RADIOFREQUENCY ABLATION Right 8/24/2021    Procedure: RADIOFREQUENCY ABLATION, L2-L3-L4-L5 MEDIAL BRANCH 1 OF 2;  Surgeon: Lina Wagner MD;  Location: [facility] PAIN MGT;  Service: Pain Management;  Laterality: Right;    RADIOFREQUENCY ABLATION Left 9/11/2021    Procedure: RADIOFREQUENCY ABLATION, L2-L3-L4-L5 MEDIAL BR ANCH 2 OF 2;  Surgeon: Lina Wagner MD;  Location: [facility] PAIN MGT;  Service: Pain Management;  Laterality: Left;    RADIOFREQUENCY ABLATION Right 3/7/2022    Procedure: RADIOFREQUENCY ABLATION RIGHT L3,4,5 1 of 2, needs consent;  Surgeon: Israel Hurtado MD;  Location: [facility] PAIN MGT;  Service: Pain Management;  Laterality: Right;    RADIOFREQUENCY ABLATION Left 3/21/2022    Procedure: RADIOFREQUENCY ABLATION RIGHT L3,4,5 2 of 2, needs consent;  Surgeon: Israel Hurtado MD;  Location: [facility] PAIN MGT;  Service: Pain Management;  Laterality: Left;    RADIOFREQUENCY ABLATION Right 5/9/2022    Procedure: RADIOFREQUENCY ABLATION RIGHT GENICULAR COOLED 1 of 2;  Surgeon: Israel Hurtado MD;  Location: [facility] PAIN MGT;  Service: Pain Management;  Laterality: Right;    RADIOFREQUENCY ABLATION Left 5/23/2022    Procedure: RADIOFREQUENCY ABLATION LEFT GENICULAR COOLED  2 of 2;  Surgeon: Israel Hurtado MD;  Location: [facility] PAIN MGT;  Service: Pain Management;  Laterality: Left;    RADIOFREQUENCY ABLATION Right 12/12/2022    Procedure: RADIOFREQUENCY ABLATION RIGHT GENICULAR COOLED  ONE OF TWO  NEEDS CONSENT;  Surgeon: Israel Hurtado MD;  Location: [facility] PAIN MGT;  Service: Pain  Management;  Laterality: Right;    RADIOFREQUENCY ABLATION Left 1/9/2023    Procedure: RADIOFREQUENCY ABLATION LEFT GENICULAR COOLED  TWO OF TWO NEEDS CONSENT;  Surgeon: Israel Hurtado MD;  Location: [facility] PAIN MGT;  Service: Pain Management;  Laterality: Left;    RADIOFREQUENCY ABLATION Right 3/6/2023    Procedure: RADIOFREQUENCY ABLATION RIGHT L3,L4,L5;  Surgeon: Israel Hurtado MD;  Location: [facility] PAIN MGT;  Service: Pain Management;  Laterality: Right;    RADIOFREQUENCY ABLATION Left 5/22/2023    Procedure: RADIOFREQUENCY ABLATION LEFT L3,L4,L5;  Surgeon: Israel Hurtado MD;  Location: [facility] PAIN MGT;  Service: Pain Management;  Laterality: Left;  4/3 LM TO R/S    RADIOFREQUENCY ABLATION Right 11/27/2023    Procedure: RADIOFREQUENCY ABLATION RIGHT L3, 4, 5 1 OF 3;  Surgeon: Israel Hurtado MD;  Location: [facility] PAIN MGT;  Service: Pain Management;  Laterality: Right;    RADIOFREQUENCY ABLATION OF LUMBAR MEDIAL BRANCH NERVE AT SINGLE LEVEL Left 6/26/2018    Procedure: RADIOFREQUENCY ABLATION, NERVE, MEDIAL BRANCH, LUMBAR, 1 LEVEL;  Surgeon: Lina Wagner MD;  Location: [facility] PAIN MGT;  Service: Pain Management;  Laterality: Left;  Left Cooled RFA @ L2,3,4,5  [address]197  with Sedation    1 of 2    RADIOFREQUENCY ABLATION OF LUMBAR MEDIAL BRANCH NERVE AT SINGLE LEVEL Right 7/10/2018    Procedure: RADIOFREQUENCY ABLATION, NERVE, MEDIAL BRANCH, LUMBAR, 1 LEVEL;  Surgeon: Lina Wagner MD;  Location: [facility] PAIN MGT;  Service: Pain Management;  Laterality: Right;  RIght Cooled RFA @ L2,3,4,5  [id number] with Sedation    2 of 2    TRIGGER FINGER RELEASE Right 2017    TRIGGER FINGER RELEASE Left 7/29/2019    Procedure: RELEASE, TRIGGER FINGER, Left Thumb;  Surgeon: Velma Garcia MD;  Location: [facility] OR;  Service: Orthopedics;  Laterality: Left;  Local w/ MAC       Time Tracking:     OT Date of Treatment: 01/05/24  OT Start Time: 0950  OT Stop Time: 1015  OT Total Time (min): 25 min    Billable Minutes:Evaluation  15  Therapeutic Activity 10    1/5/2024

## 2024-01-05 NOTE — PLAN OF CARE
[facility] Care Plan    POC reviewed with Alivia Marie Cyr and family at 1400. Pt verbalized understanding. Questions and concerns addressed. No acute events today. Pt progressing toward goals. Will continue to monitor. See below and flowsheets for full assessment and VS info.   - neuro checks hourly  - MRI brain done  - OOB with PT and nurse  - tolerating PO intake  - NIH 0          Is this a stroke patient? yes- Stroke booklet reviewed with patient and family, risk factors identified for patient and stroke booklet remains at bedside for ongoing education.     Neuro:  [place] Coma Scale  Best Eye Response: 4-->(E4) spontaneous  Best Motor Response: 6-->(M6) obeys commands  Best Verbal Response: 5-->(V5) oriented  [place] Coma Scale Score: 15  Pupil PERRLA: yes     24 hr Temp:  [97.9 °F (36.6 °C)-98.4 °F (36.9 °C)]     CV:   Rhythm: normal sinus rhythm  BP goals:   SBP < 180  MAP > 65    Resp:           Plan: N/A    GI/:     Diet/Nutrition Received: consistent carb/diabetic diet  Last Bowel Movement: 01/04/24  Voiding Characteristics: external catheter    Intake/Output Summary (Last 24 hours) at 1/5/2024 1607  Last data filed at 1/5/2024 0954  Gross per 24 hour   Intake 240 ml   Output 800 ml   Net -560 ml     Unmeasured Output  Urine Occurrence: 1  Pad Count: 2    Labs/Accuchecks:  Recent Labs   Lab 01/05/24  0013   WBC 5.94   RBC 3.77*   HGB 11.9*   HCT 35.8*         Recent Labs   Lab 01/05/24  0013      K 4.0   CO2 22*      BUN 20   CREATININE 0.6   ALKPHOS 53*   ALT 10   AST 15   BILITOT 0.5      Recent Labs   Lab 01/04/24  2031   INR 1.0      Recent Labs   Lab 01/04/24  2041   TROPONINI <0.006       Electrolytes: N/A - electrolytes WDL  Accuchecks: ACHS    Gtts:      LDA/Wounds:  Lines/Drains/Airways       Drain  Duration             Female External Urinary Catheter w/ Suction 01/04/24 2247 <1 day              Peripheral Intravenous Line  Duration                  Peripheral IV - Single Lumen  01/04/24 1950 20 G Right Antecubital <1 day         Peripheral IV - Single Lumen 01/04/24 2037 20 G Left Antecubital <1 day                  Wounds: No  Wound care consulted: No

## 2024-01-05 NOTE — ASSESSMENT & PLAN NOTE
Alivia Thomas is a 58 y.o. female with PMH of HTN, HLD, DM, morbid obesity, PADDY, depression, asthma, GERD that presents to the ED c/o acute onset mild R MCA syndrome. LKN ~2.5 hrs PTA. Also reported associated HA and neck pain. Of note, exam with some inconsistency in weakness on repeat testing. Exam significant for LUE/LLE weakness with drift, mild L facial droop, mild dysarthria, and decreased sensation to L side that resolves at midline. NIHSS 4. CTH/CTA unremarkable.     No contraindications for thrombolytic therapy, discussed risk/benefit discussed with patient who verbalized understanding and is agreeable to treatment with tenecteplase. TNK given at 2035. Not candidate for thrombectomy due to no LVO on CTA. Patient will be admitted to [facility] for close monitoring and observation post-TNK.      Antithrombotics for secondary stroke prevention: Antiplatelets: None: Hold all Antithrombotics x 24 hours after IV Thrombolytic therapy administration    Statins for secondary stroke prevention and hyperlipidemia, if present:   Statins: Atorvastatin- 40 mg daily    Aggressive risk factor modification: HTN, DM, HLD, Diet, Exercise, Obesity     Rehab efforts: The patient has been evaluated by a stroke team provider and the therapy needs have been fully considered based off the presenting complaints and exam findings. The following therapy evaluations are needed: PT evaluate and treat, OT evaluate and treat, SLP evaluate and treat    Diagnostics ordered/pending: HgbA1C to assess blood glucose levels, Lipid Profile to assess cholesterol levels, MRI head without contrast to assess brain parenchyma, TTE to assess cardiac function/status , TSH to assess thyroid function    VTE prophylaxis: Mechanical prophylaxis: Place SCDs  None: Reason for No Pharmacological VTE Prophylaxis: Holding x 24 hours s/p treatment with Thrombolytic therapy    BP parameters: Infarct: Post Thrombolytic therapy, SBP <180

## 2024-01-05 NOTE — HPI
Ms. Alivia Thomas is a 58 y.o. female with PMH of HTN, HLD, DM, morbid obesity, PADDY, depression, asthma, and GERD that presents to [facility] with acute onset mild R MCA syndrome. Early this evening, she experienced LUE, LLE weakness, mild L facial droop, mild dysarthria, and decreased sensation on L. She also notes associated HA and neck pain. LKN ~2.5 hrs P[place] [facility]. Initial NIHSS 4. CTH/CTA unremarkable without ICH or LVO. TNK given at 2035. She will be admitted to [facility] for close monitoring and observation post-TNK.

## 2024-01-05 NOTE — HPI
Alivia Thomas is a 58 y.o. female with PMH of HTN, HLD, DM, morbid obesity, PADDY, depression, asthma, GERD that presents to the ED c/o acute onset L hemiparesis, L facial droop, L paresthesia, and dysarthria. LKN 5:30pm, approx. 2.5 hrs PTA. Patient also reports associated HA and neck pain. Denies history of similar episodes, CP, SOB, difficulty swallowing, or new vision changes. Of note, exam with some inconsistency in weakness on repeat testing. Exam significant for LUE/LLE weakness with drift, mild L facial droop, mild dysarthria, and decreased sensation to L side that resolves at midline. NIHSS 4. CTH/CTA stroke multiphase unremarkable for acute hemorrhage, LVO or critical stenosis. No contraindications for thrombolytic therapy, discussed risk/benefit discussed with patient who verbalized understanding and is agreeable to treatment with tenecteplase. TNK given at 2035. Not candidate for thrombectomy due to no LVO on CTA. Patient will be admitted to [facility] for close monitoring and observation post-TNK. Patient w/ resolution of symptoms and no issues during monitoring period s/p TNK. Patient to f/u with neurology in clinic in 1 month

## 2024-01-05 NOTE — PLAN OF CARE
[facility] Care Plan    POC reviewed with Alivia Marie Cyr and spouse at 0300. Pt verbalized understanding. Questions and concerns addressed. No acute events overnight. Pt progressing toward goals. Will continue to monitor. See below and flowsheets for full assessment and VS info.     Post TNK admission, end time 2035   NIH 0 upon arrival to [facility]   MRI ordered today for 1500   Tylenol PRN x1 overnight for headache rated 3 on a scale of 1-10, full relief obtained      Is this a stroke patient? yes- Stroke booklet reviewed with patient, risk factors identified for patient and stroke booklet remains at bedside for ongoing education.     Neuro:  [place] Coma Scale  Best Eye Response: 4-->(E4) spontaneous  Best Motor Response: 6-->(M6) obeys commands  Best Verbal Response: 5-->(V5) oriented  [place] Coma Scale Score: 15  Pupil PERRLA: yes     24hr Temp:  [97.9 °F (36.6 °C)-98.4 °F (36.9 °C)]     CV:   Rhythm: normal sinus rhythm  BP goals:   SBP < 180  MAP > 65    Resp:           Plan: N/A    GI/:     Diet/Nutrition Received: NPO  Last Bowel Movement: 01/04/24  Voiding Characteristics: external catheter  No intake or output data in the 24 hours ending 01/05/24 0407       Labs/Accuchecks:  Recent Labs   Lab 01/05/24  0013   WBC 5.94   RBC 3.77*   HGB 11.9*   HCT 35.8*         Recent Labs   Lab 01/05/24  0013      K 4.0   CO2 22*      BUN 20   CREATININE 0.6   ALKPHOS 53*   ALT 10   AST 15   BILITOT 0.5      Recent Labs   Lab 01/04/24 2031   INR 1.0      Recent Labs   Lab 01/04/24 2041   TROPONINI <0.006       Electrolytes: N/A - electrolytes WDL  Accuchecks: Q6H    Gtts:      LDA/Wounds:  Lines/Drains/Airways       Drain  Duration             Female External Urinary Catheter w/ Suction 01/04/24 2247 <1 day              Peripheral Intravenous Line  Duration                  Peripheral IV - Single Lumen 01/04/24 1950 20 G Right Antecubital <1 day         Peripheral IV - Single Lumen 01/04/24 2037 20 G Left  Antecubital <1 day                  Wounds: No  Wound care consulted: No    Problem: Adjustment to Illness (Stroke, Ischemic/Transient Ischemic Attack)  Goal: Optimal Coping  Outcome: Ongoing, Progressing     Problem: Bowel Elimination Impaired (Stroke, Ischemic/Transient Ischemic Attack)  Goal: Effective Bowel Elimination  Outcome: Ongoing, Progressing     Problem: Cerebral Tissue Perfusion (Stroke, Ischemic/Transient Ischemic Attack)  Goal: Optimal Cerebral Tissue Perfusion  Outcome: Ongoing, Progressing     Problem: Cognitive Impairment (Stroke, Ischemic/Transient Ischemic Attack)  Goal: Optimal Cognitive Function  Outcome: Ongoing, Progressing     Problem: Communication Impairment (Stroke, Ischemic/Transient Ischemic Attack)  Goal: Improved Communication Skills  Outcome: Ongoing, Progressing     Problem: Functional Ability Impaired (Stroke, Ischemic/Transient Ischemic Attack)  Goal: Optimal Functional Ability  Outcome: Ongoing, Progressing     Problem: Respiratory Compromise (Stroke, Ischemic/Transient Ischemic Attack)  Goal: Effective Oxygenation and Ventilation  Outcome: Ongoing, Progressing     Problem: Sensorimotor Impairment (Stroke, Ischemic/Transient Ischemic Attack)  Goal: Improved Sensorimotor Function  Outcome: Ongoing, Progressing     Problem: Swallowing Impairment (Stroke, Ischemic/Transient Ischemic Attack)  Goal: Optimal Eating and Swallowing without Aspiration  Outcome: Ongoing, Progressing     Problem: Urinary Elimination Impaired (Stroke, Ischemic/Transient Ischemic Attack)  Goal: Effective Urinary Elimination  Outcome: Ongoing, Progressing     Problem: Adult Inpatient Plan of Care  Goal: Plan of Care Review  Outcome: Ongoing, Progressing  Goal: Patient-Specific Goal (Individualized)  Outcome: Ongoing, Progressing  Goal: Absence of Hospital-Acquired Illness or Injury  Outcome: Ongoing, Progressing  Goal: Optimal Comfort and Wellbeing  Outcome: Ongoing, Progressing  Goal: Readiness for Transition  of Care  Outcome: Ongoing, Progressing     Problem: Diabetes Comorbidity  Goal: Blood Glucose Level Within Targeted Range  Outcome: Ongoing, Progressing     Problem: Skin Injury Risk Increased  Goal: Skin Health and Integrity  Outcome: Ongoing, Progressing

## 2024-01-05 NOTE — SUBJECTIVE & OBJECTIVE
Objective:     Vitals:  Temp: 98.4 °F (36.9 °C)  Pulse: 67  Rhythm: normal sinus rhythm  BP: 124/68  MAP (mmHg): 88  Resp: (!) 25  SpO2: 97 %    Temp  Min: 97.9 °F (36.6 °C)  Max: 98.4 °F (36.9 °C)  Pulse  Min: 64  Max: 90  BP  Min: 122/65  Max: 172/83  MAP (mmHg)  Min: 84  Max: 124  Resp  Min: 11  Max: 27  SpO2  Min: 93 %  Max: 100 %    01/04 0701 - 01/05 0700  In: -   Out: 800 [Urine:800]   Unmeasured Output  Urine Occurrence: 1  Pad Count: 2        Physical Exam  Constitutional:       Appearance: resting in bed in no distress, alert and well-nourished  Cardiovascular:      Rate and Rhythm: Normal rate and regular rhythm.  Pulses intact x4  Pulmonary:      Effort: Pulmonary effort is normal. No respiratory distress.   Abdominal:      Palpations: Abdomen is soft, non-tender   Neurological:   [id number]  Follows all commands and responds to questions appropriately, repetition and naming intact. Face symmetric and no dysarthria, EOMi, PERRL  Visual fields intact  5/5 motor score throughout without drift  Sensation intact to light touch  No dysmetria/ataxia to bedside testing    NIHSS 0        Medications:  Continuous ScheduledallopurinoL, 300 mg, Q12H  atorvastatin, 40 mg, Daily  cyanocobalamin, 250 mcg, Daily  famotidine, 20 mg, BID  FLUoxetine, 20 mg, Daily  oxybutynin, 5 mg, Daily  senna-docusate 8.6-50 mg, 1 tablet, Daily    PRNacetaminophen, 650 mg, Q6H PRN  albuterol, 2 puff, Q6H PRN  dextrose 10%, 12.5 g, PRN  dextrose 10%, 25 g, PRN  diclofenac sodium, 2 g, QID PRN  glucagon (human recombinant), 1 mg, PRN  glucose, 16 g, PRN  glucose, 24 g, PRN  hydrALAZINE, 10 mg, Q4H PRN  insulin aspart U-100, 0-10 Units, QID (AC + HS) PRN  labetalol, 10 mg, Q4H PRN  sodium chloride 0.9%, 10 mL, PRN      Today I personally reviewed pertinent medications, lines/drains/airways, imaging, cardiology results, laboratory results, microbiology results,     Diet  Diet diabetic 2000 Calorie  Diet diabetic 2000 Calorie

## 2024-01-05 NOTE — ED TRIAGE NOTES
Alivia Thomas, a 58 y.o. female presents to the ED w/ complaint of facial droop and left upper extremity weakness starting at 520. Patient Aaox4    Adult Physical Assessment  LOC: Alivia Thomas, 58 y.o. female verified via two identifiers.  The patient is awake, alert, oriented and speaking appropriately at this time.  APPEARANCE: Patient resting comfortably and appears to be in no acute distress at this time. Patient is clean and well groomed, patient's clothing is properly fastened.  SKIN:The skin is warm and dry, color consistent with ethnicity, patient has normal skin turgor and moist mucus membranes, skin intact, no breakdown or brusing noted.  MUSCULOSKELETAL: Patient moving all extremities well, no obvious swelling or deformities noted. Patient reports left sided facial droop and left arm weakness starting around 520pm. Some numbness and tingling noted to bilateral legs and left arm   RESPIRATORY: Airway is open and patent, respirations are spontaneous, patient has a normal effort and rate, no accessory muscle use noted.  CARDIAC: Patient has a normal rate and rhythm, no periphreal edema noted in any extremity, capillary refill < 3 seconds in all extremities  ABDOMEN: Soft and non tender to palpation, no abdominal distention noted. Bowel sounds present in all four quadrants.  NEUROLOGIC: Eyes open spontaneously, behavior appropriate to situation, follows commands, facial expression symmetrical, bilateral hand grasp equal and even, purposeful motor response noted, normal sensation in all extremities when touched with a finger. Patients speech is slurred when speaking. AAOX4      Triage note:  Chief Complaint   Patient presents with    Facial Droop     L facial droop, slurred speech, L arm weakness and numbness, LKN 5:20pm     Review of patient's allergies indicates:   Allergen Reactions    Strawberry Anaphylaxis     Past Medical History:   Diagnosis Date    Acute right MCA stroke 1/4/2024    Allergy      Anemia     Asthma     Bilateral shoulder bursitis     Cervical stenosis of spine     Chronic pain     DDD (degenerative disc disease), cervical     Depression 01/28/2019    Diabetes mellitus type II     DJD (degenerative joint disease) of knee 06/19/2014    Facet arthritis of lumbar region 12/17/2015    Facet syndrome 12/17/2015    GERD (gastroesophageal reflux disease)     Heartburn     Hyperlipidemia     Hypertension     Morbid obesity     Neuromuscular disorder     Nuclear sclerotic cataract of left eye 8/8/2023    PADDY (obstructive sleep apnea)     Proteinuria     Right carpal tunnel syndrome     Sacroiliitis 06/13/2018    Sacroiliitis     Sleep apnea     Uterine fibroid

## 2024-01-06 VITALS
BODY MASS INDEX: 39.99 KG/M2 | HEART RATE: 101 BPM | OXYGEN SATURATION: 97 % | RESPIRATION RATE: 20 BRPM | WEIGHT: 240 LBS | DIASTOLIC BLOOD PRESSURE: 69 MMHG | HEIGHT: 65 IN | SYSTOLIC BLOOD PRESSURE: 133 MMHG | TEMPERATURE: 98 F

## 2024-01-06 PROBLEM — G45.9 TIA (TRANSIENT ISCHEMIC ATTACK): Status: ACTIVE | Noted: 2024-01-04

## 2024-01-06 LAB
ALBUMIN SERPL BCP-MCNC: 3.2 G/DL (ref 3.5–5.2)
ALP SERPL-CCNC: 53 U/L (ref 55–135)
ALT SERPL W/O P-5'-P-CCNC: 12 U/L (ref 10–44)
ANION GAP SERPL CALC-SCNC: 8 MMOL/L (ref 8–16)
AST SERPL-CCNC: 15 U/L (ref 10–40)
BASOPHILS # BLD AUTO: 0.02 K/UL (ref 0–0.2)
BASOPHILS NFR BLD: 0.4 % (ref 0–1.9)
BILIRUB SERPL-MCNC: 0.4 MG/DL (ref 0.1–1)
BUN SERPL-MCNC: 12 MG/DL (ref 6–20)
CALCIUM SERPL-MCNC: 8.7 MG/DL (ref 8.7–10.5)
CHLORIDE SERPL-SCNC: 109 MMOL/L (ref 95–110)
CO2 SERPL-SCNC: 24 MMOL/L (ref 23–29)
CREAT SERPL-MCNC: 0.7 MG/DL (ref 0.5–1.4)
DIFFERENTIAL METHOD BLD: ABNORMAL
EOSINOPHIL # BLD AUTO: 0.2 K/UL (ref 0–0.5)
EOSINOPHIL NFR BLD: 4.4 % (ref 0–8)
ERYTHROCYTE [DISTWIDTH] IN BLOOD BY AUTOMATED COUNT: 13.7 % (ref 11.5–14.5)
EST. GFR  (NO RACE VARIABLE): >60 ML/MIN/1.73 M^2
GLUCOSE SERPL-MCNC: 87 MG/DL (ref 70–110)
HCT VFR BLD AUTO: 36.3 % (ref 37–48.5)
HGB BLD-MCNC: 12.6 G/DL (ref 12–16)
IMM GRANULOCYTES # BLD AUTO: 0.01 K/UL (ref 0–0.04)
IMM GRANULOCYTES NFR BLD AUTO: 0.2 % (ref 0–0.5)
LYMPHOCYTES # BLD AUTO: 2.1 K/UL (ref 1–4.8)
LYMPHOCYTES NFR BLD: 45.8 % (ref 18–48)
MAGNESIUM SERPL-MCNC: 1.9 MG/DL (ref 1.6–2.6)
MCH RBC QN AUTO: 31.1 PG (ref 27–31)
MCHC RBC AUTO-ENTMCNC: 34.7 G/DL (ref 32–36)
MCV RBC AUTO: 90 FL (ref 82–98)
MONOCYTES # BLD AUTO: 0.4 K/UL (ref 0.3–1)
MONOCYTES NFR BLD: 9.8 % (ref 4–15)
NEUTROPHILS # BLD AUTO: 1.8 K/UL (ref 1.8–7.7)
NEUTROPHILS NFR BLD: 39.4 % (ref 38–73)
NRBC BLD-RTO: 0 /100 WBC
PHOSPHATE SERPL-MCNC: 3.5 MG/DL (ref 2.7–4.5)
PLATELET # BLD AUTO: 223 K/UL (ref 150–450)
PMV BLD AUTO: 10.9 FL (ref 9.2–12.9)
POCT GLUCOSE: 103 MG/DL (ref 70–110)
POCT GLUCOSE: 109 MG/DL (ref 70–110)
POCT GLUCOSE: 87 MG/DL (ref 70–110)
POTASSIUM SERPL-SCNC: 3.9 MMOL/L (ref 3.5–5.1)
PROT SERPL-MCNC: 6.7 G/DL (ref 6–8.4)
RBC # BLD AUTO: 4.05 M/UL (ref 4–5.4)
SODIUM SERPL-SCNC: 141 MMOL/L (ref 136–145)
WBC # BLD AUTO: 4.5 K/UL (ref 3.9–12.7)

## 2024-01-06 PROCEDURE — 25000003 PHARM REV CODE 250: Performed by: NURSE PRACTITIONER

## 2024-01-06 PROCEDURE — 99499 UNLISTED E&M SERVICE: CPT | Mod: ,,, | Performed by: NURSE PRACTITIONER

## 2024-01-06 PROCEDURE — 25000003 PHARM REV CODE 250: Performed by: PSYCHIATRY & NEUROLOGY

## 2024-01-06 PROCEDURE — 99238 HOSP IP/OBS DSCHRG MGMT 30/<: CPT | Mod: GC,,, | Performed by: PSYCHIATRY & NEUROLOGY

## 2024-01-06 PROCEDURE — 80053 COMPREHEN METABOLIC PANEL: CPT

## 2024-01-06 PROCEDURE — 25000003 PHARM REV CODE 250

## 2024-01-06 PROCEDURE — 84100 ASSAY OF PHOSPHORUS: CPT | Performed by: PSYCHIATRY & NEUROLOGY

## 2024-01-06 PROCEDURE — 99233 SBSQ HOSP IP/OBS HIGH 50: CPT | Mod: FS,,, | Performed by: PSYCHIATRY & NEUROLOGY

## 2024-01-06 PROCEDURE — 97116 GAIT TRAINING THERAPY: CPT

## 2024-01-06 PROCEDURE — 85025 COMPLETE CBC W/AUTO DIFF WBC: CPT

## 2024-01-06 PROCEDURE — 63600175 PHARM REV CODE 636 W HCPCS: Performed by: NURSE PRACTITIONER

## 2024-01-06 PROCEDURE — 97161 PT EVAL LOW COMPLEX 20 MIN: CPT

## 2024-01-06 PROCEDURE — 83735 ASSAY OF MAGNESIUM: CPT

## 2024-01-06 RX ORDER — OXYCODONE AND ACETAMINOPHEN 10; 325 MG/1; MG/1
1 TABLET ORAL EVERY 4 HOURS PRN
Qty: 28 TABLET | Refills: 0 | Status: SHIPPED | OUTPATIENT
Start: 2024-01-06 | End: 2024-01-09

## 2024-01-06 RX ORDER — POLYETHYLENE GLYCOL 3350 17 G/17G
17 POWDER, FOR SOLUTION ORAL DAILY
Status: CANCELLED | OUTPATIENT
Start: 2024-01-06

## 2024-01-06 RX ORDER — GUAIFENESIN 100 MG/5ML
81 LIQUID (ML) ORAL DAILY
Qty: 30 TABLET | Refills: 11 | Status: SHIPPED | OUTPATIENT
Start: 2024-01-07 | End: 2024-02-20

## 2024-01-06 RX ORDER — AMOXICILLIN 250 MG
1 CAPSULE ORAL 2 TIMES DAILY
Status: CANCELLED | OUTPATIENT
Start: 2024-01-06

## 2024-01-06 RX ORDER — ATORVASTATIN CALCIUM 80 MG/1
80 TABLET, FILM COATED ORAL DAILY
Qty: 90 TABLET | Refills: 3 | Status: SHIPPED | OUTPATIENT
Start: 2024-01-07 | End: 2025-01-06

## 2024-01-06 RX ORDER — SODIUM CHLORIDE 0.9 % (FLUSH) 0.9 %
10 SYRINGE (ML) INJECTION
Status: CANCELLED | OUTPATIENT
Start: 2024-01-06

## 2024-01-06 RX ORDER — ATORVASTATIN CALCIUM 40 MG/1
80 TABLET, FILM COATED ORAL DAILY
Status: DISCONTINUED | OUTPATIENT
Start: 2024-01-06 | End: 2024-01-06 | Stop reason: HOSPADM

## 2024-01-06 RX ORDER — ONDANSETRON 2 MG/ML
4 INJECTION INTRAMUSCULAR; INTRAVENOUS EVERY 6 HOURS PRN
Status: DISCONTINUED | OUTPATIENT
Start: 2024-01-06 | End: 2024-01-06 | Stop reason: HOSPADM

## 2024-01-06 RX ADMIN — ATORVASTATIN CALCIUM 80 MG: 40 TABLET, FILM COATED ORAL at 08:01

## 2024-01-06 RX ADMIN — OXYBUTYNIN CHLORIDE 5 MG: 5 TABLET, EXTENDED RELEASE ORAL at 08:01

## 2024-01-06 RX ADMIN — ASPIRIN 81 MG CHEWABLE TABLET 81 MG: 81 TABLET CHEWABLE at 08:01

## 2024-01-06 RX ADMIN — ONDANSETRON 4 MG: 2 INJECTION INTRAMUSCULAR; INTRAVENOUS at 11:01

## 2024-01-06 RX ADMIN — FAMOTIDINE 20 MG: 20 TABLET, FILM COATED ORAL at 08:01

## 2024-01-06 RX ADMIN — ALLOPURINOL 300 MG: 100 TABLET ORAL at 08:01

## 2024-01-06 RX ADMIN — HEPARIN SODIUM 5000 UNITS: 5000 INJECTION INTRAVENOUS; SUBCUTANEOUS at 06:01

## 2024-01-06 RX ADMIN — FLUOXETINE 20 MG: 20 CAPSULE ORAL at 08:01

## 2024-01-06 NOTE — PT/OT/SLP EVAL
"Physical Therapy Evaluation and Discharge Note    Patient Name:  Alivia Thomas   MRN:  [id number]  Admitting Diagnosis:  Acute right MCA stroke   Recent Surgery: * No surgery found *    Admit Date: 1/4/2024  Length of Stay: 2 days    Recommendations:     Discharge Recommendations:  No Therapy Indicated   Discharge Equipment Recommendations: none   Justification for Equipment: N/A  Barriers to discharge: None    Assessment:     Alivia Thomas is a 58 y.o. female admitted with a medical diagnosis of Acute right MCA stroke. .  At this time, patient is functioning at their prior level of function and does not require further acute PT services.     Highest level of mobility achieved this visit: ambulates 250ft w/ no A/D and SBA    Treatment Tolerated: Excellent    Plan:     During this hospitalization, patient does not require further acute PT services.  Please re-consult if situation changes.      Subjective     DARIUSZ Dowd notified prior to session. No family present upon PT entrance into room.    Chief Complaint: pain  Patient/Family Comments/goals: "I love to cook!"  Pain/Comfort:  Pain Rating 1: 8/10  Location - Orientation 1: generalized  Location 1: back  Pain Addressed 1: Reposition, Distraction  Location - Side 2: Bilateral  Location - Orientation 2: generalized  Location 2: knee  Pain Addressed 2: Reposition, Distraction    Social History:  Residence: lives with their spouse 1-story house with 0 MATTIE.  Support available: Spouse  Equipment Used: none  Equipment Owned (not using): None  Prior level of function: independent  Work: Disability.   Drive: yes.     Objective:     Additional staff present: none    Patient found HOB elevated with telemetry, pulse ox (continuous) upon PT entry to room.    General Precautions: Standard, aspiration, fall   Orthopedic Precautions:N/A   Braces: N/A   Body mass index is 39.94 kg/m².  Oxygen Device: Room Air  Vitals: /69 (BP Location: Left arm, Patient Position: " "Lying)   Pulse 101   Temp 97.6 °F (36.4 °C) (Oral)   Resp 20   Ht 5' 5" (1.651 m)   Wt 108.9 kg (240 lb)   LMP 06/26/2018   SpO2 97%   BMI 39.94 kg/m²     Exam:  Cognition:   Alert and Cooperative  Command following: Follows multistep verbal commands  Fluency: clear/fluent  Skin Integrity: Visible skin intact  Edema: None noted  Sensation: Intact to light touch  Hearing: Intact  Vision:  Intact  Coordination: grossly intact  Range of Motion:  RLE: WFL  LLE: WFL  Strength Exam:  Bilateral Lower Extremity Strength: grossly WFL    Outcome Measures:  AM-PAC 6 CLICK MOBILITY  Turning over in bed (including adjusting bedclothes, sheets and blankets)?: 4  Sitting down on and standing up from a chair with arms (e.g., wheelchair, bedside commode, etc.): 4  Moving from lying on back to sitting on the side of the bed?: 4  Moving to and from a bed to a chair (including a wheelchair)?: 4  Need to walk in hospital room?: 3  Climbing 3-5 steps with a railing?: 3  Basic Mobility Total Score: 22     Functional Mobility:    Bed Mobility:   Supine to Sit: supervision; from R side of bed  Scooting anteriorly to EOB to have both feet planted on floor: supervision    Sitting Balance at Edge of Bed:  Assistance Level Required: [place]  Postural deviations noted: no deviations noted    Transfers:   Sit <> Stand Transfer: supervision with no assistive device   Stand <> Sit Transfer: supervision with no assistive device   x2 trials from EOB    Standing Balance:  Assistance Level Required: Stand-by Assistance  Patient used: no assistive device   Time: 10 min  Postural deviations noted: no deviations noted    Gait:   Patient ambulated: 250 ft  Patient required: standy by assistance  Patient used:  no assistive device   Gait Pattern observed: swing-through gait  Gait Deviation(s): decreased jose juan  Impairments due to: impaired balance    Education:  Time provided for education, counseling and discussion of health disposition in " regards to patient's current status  All questions answered within PT scope of practice and to patient's satisfaction    Patient left sitting edge of bed with call button in reach.    History:     Past Medical History:   Diagnosis Date    Acute right MCA stroke 1/4/2024    Allergy     Anemia     Asthma     Bilateral shoulder bursitis     Cervical stenosis of spine     Chronic pain     DDD (degenerative disc disease), cervical     Depression 01/28/2019    Diabetes mellitus type II     DJD (degenerative joint disease) of knee 06/19/2014    Facet arthritis of lumbar region 12/17/2015    Facet syndrome 12/17/2015    GERD (gastroesophageal reflux disease)     Heartburn     Hyperlipidemia     Hypertension     Morbid obesity     Neuromuscular disorder     Nuclear sclerotic cataract of left eye 8/8/2023    PADDY (obstructive sleep apnea)     Proteinuria     Right carpal tunnel syndrome     Sacroiliitis 06/13/2018    Sacroiliitis     Sleep apnea     Uterine fibroid        Past Surgical History:   Procedure Laterality Date    CARPAL TUNNEL RELEASE      CARPAL TUNNEL RELEASE  1980s    left    CARPAL TUNNEL RELEASE  2012    right    CATARACT EXTRACTION W/  INTRAOCULAR LENS IMPLANT Left 8/8/2023    Procedure: EXTRACTION, CATARACT, WITH IOL INSERTION;  Surgeon: Justin Block MD;  [facility];  Service: Ophthalmology;  Laterality: Left;    CATARACT EXTRACTION W/  INTRAOCULAR LENS IMPLANT Right 8/29/2023    Procedure: EXTRACTION, CATARACT, WITH IOL INSERTION;  Surgeon: Justin Block MD;  [facility]: [facility];  Service: Ophthalmology;  Laterality: Right;    COLONOSCOPY N/A 6/15/2020    Procedure: COLONOSCOPY;  Surgeon: Jc Koo MD;  [facility]  Service: Endoscopy;  Laterality: N/A;  COVID screening on 6/13/20 at [facility]-rb  pt updated on drop off location and no visitor policy-rb    EPIDURAL STEROID INJECTION N/A 7/24/2023    Procedure: INJECTION, STEROID, EPIDURAL, L5/S1 SOONER  DATE;  Surgeon: Israel Hurtado MD;  Location: [facility];  Service: Pain Management;  Laterality: N/A;    ESOPHAGOGASTRODUODENOSCOPY N/A 3/27/2019    Procedure: EGD (ESOPHAGOGASTRODUODENOSCOPY);  Surgeon: Robbin Bar MD;  Location: [facility] (2ND FLR);  Service: Endoscopy;  Laterality: N/A;  BMI 61.3/2nd floor case/svn    INJECTION OF JOINT Bilateral 6/9/2020    Procedure: INJECTION, JOINT, BILATERAL SI;  Surgeon: Lina Wagner MD;  Location: [facility];  Service: Pain Management;  Laterality: Bilateral;  B/L SI Joint Injection    INJECTION OF JOINT Bilateral 5/11/2021    Procedure: INJECTION, JOINT, SACROILIAC (SI) need consent;  Surgeon: Lina Wagner MD;  Location: [facility];  Service: Pain Management;  Laterality: Bilateral;    JOINT REPLACEMENT Bilateral     with 2 revisions on rt    KNEE SURGERY  3/2010    orthroscope    KNEE SURGERY  6-19-14    left TKR    LAPAROSCOPIC SLEEVE GASTRECTOMY N/A 8/28/2019    Procedure: GASTRECTOMY, SLEEVE, LAPAROSCOPIC with intraop EGD;  Surgeon: Heriberto Clements MD;  Location: [facility] OR 2ND FLR;  Service: General;  Laterality: N/A;    PANNICULECTOMY N/A 6/14/2022    Procedure: PANNICULECTOMY;  Surgeon: Rhett Gray MD;  Location: [facility] OR 2ND FLR;  Service: Plastics;  Laterality: N/A;    RADIOFREQUENCY ABLATION Right 7/9/2019    Procedure: RADIOFREQUENCY ABLATION;  Surgeon: Lina Wagner MD;  Location: [facility];  Service: Pain Management;  Laterality: Right;  RIGHT RFA L2,3,4,5  2 of 2    RADIOFREQUENCY ABLATION Left 12/19/2019    Procedure: RADIOFREQUENCY ABLATION, LEFT L2-L3-L4-L5 MEDIAL BRANCH 1 OF 2;  Surgeon: Lina Wagner MD;  Location: [facility] PAIN T;  Service: Pain Management;  Laterality: Left;    RADIOFREQUENCY ABLATION Right 12/31/2019    Procedure: RADIOFREQUENCY ABLATION, RIGHT L2-L3-L4-L5  2 OF 2;  Surgeon: Lina Wagner MD;  Location: [facility] PAIN MGT;  Service: Pain Management;  Laterality: Right;     RADIOFREQUENCY ABLATION Right 10/13/2020    Procedure: RADIOFREQUENCY ABLATION, Genicular Cooled 1 of 2;  Surgeon: Lina Wagner MD;  Location: [facility]IN [facility]GT;  Service: Pain Management;  Laterality: Right;    RADIOFREQUENCY ABLATION Left 11/3/2020    Procedure: RADIOFREQUENCY ABLATION, GENICULAR COOLED  2 OF 2;  Surgeon: Lina Wagner MD;  Location: [facility] [place] [facility]IN MGT;  Service: Pain Management;  Laterality: Left;    RADIOFREQUENCY ABLATION Left 12/15/2020    Procedure: RADIOFREQUENCY ABLATION LEFT L2,3,4,5 1 of 2;  Surgeon: Lina Wagner MD;  Location: [facility];  Service: Pain Management;  Laterality: Left;    RADIOFREQUENCY ABLATION Right 12/29/2020    Procedure: RADIOFREQUENCY ABLATION RIGHT L2,3,4,5 2 of 2;  Surgeon: Lina Wagner MD;  Location: [facility] [place] [facility]IN MGT;  Service: Pain Management;  Laterality: Right;    RADIOFREQUENCY ABLATION Left 6/29/2021    Procedure: RADIOFREQUENCY ABLATION GENICULAR COOLED;  Surgeon: Lina Wagner MD;  Location: [facility] [place] [facility]IN MGT;  Service: Pain Management;  Laterality: Left;  1 of 2    RADIOFREQUENCY ABLATION Right 7/13/2021    Procedure: RADIOFREQUENCY ABLATION GENICULAR;  Surgeon: Lina Wagner MD;  Location: [facility] [place] [facility]IN [facility]GT;  Service: Pain Management;  Laterality: Right;  2 of 2    RADIOFREQUENCY ABLATION Right 8/24/2021    Procedure: RADIOFREQUENCY ABLATION, L2-L3-L4-L5 MEDIAL BRANCH 1 OF 2;  Surgeon: Lina Wagner MD;  Location: [facility] [place] [facility]IN [facility]GT;  Service: Pain Management;  Laterality: Right;    RADIOFREQUENCY ABLATION Left 9/11/2021    Procedure: RADIOFREQUENCY ABLATION, L2-L3-L4-L5 MEDIAL BR ANCH 2 OF 2;  Surgeon: Lina Wagner MD;  Location: [facility] [place] [facility]IN MGT;  Service: Pain Management;  Laterality: Left;    RADIOFREQUENCY ABLATION Right 3/7/2022    Procedure: RADIOFREQUENCY ABLATION RIGHT L3,4,5 1 of 2, needs consent;  Surgeon: Israel Hurtado MD;  Location: [facility] [place] [facility]IN [facility]GT;  Service: Pain Management;  Laterality: Right;    RADIOFREQUENCY ABLATION Left  3/21/2022    Procedure: RADIOFREQUENCY ABLATION RIGHT L3,4,5 2 of 2, needs consent;  Surgeon: Israel Hurtado MD;  Location: [facility];  Service: Pain Management;  Laterality: Left;    RADIOFREQUENCY ABLATION Right 5/9/2022    Procedure: RADIOFREQUENCY ABLATION RIGHT GENICULAR COOLED 1 of 2;  Surgeon: Israel Hurtado MD;  Location: [facility] PAIN MGT;  Service: Pain Management;  Laterality: Right;    RADIOFREQUENCY ABLATION Left 5/23/2022    Procedure: RADIOFREQUENCY ABLATION LEFT GENICULAR COOLED  2 of 2;  Surgeon: Israel Hurtado MD;  Location: [facility] PAIN MGT;  Service: Pain Management;  Laterality: Left;    RADIOFREQUENCY ABLATION Right 12/12/2022    Procedure: RADIOFREQUENCY ABLATION RIGHT GENICULAR COOLED  ONE OF TWO  NEEDS CONSENT;  Surgeon: Israel Hurtado MD;  Location: [facility];  Service: Pain Management;  Laterality: Right;    RADIOFREQUENCY ABLATION Left 1/9/2023    Procedure: RADIOFREQUENCY ABLATION LEFT GENICULAR COOLED  TWO OF TWO NEEDS CONSENT;  Surgeon: Israel Hurtado MD;  Location: [facility] PAIN MGT;  Service: Pain Management;  Laterality: Left;    RADIOFREQUENCY ABLATION Right 3/6/2023    Procedure: RADIOFREQUENCY ABLATION RIGHT L3,L4,L5;  Surgeon: Israel Hurtado MD;  Location: [facility] PAIN MGT;  Service: Pain Management;  Laterality: Right;    RADIOFREQUENCY ABLATION Left 5/22/2023    Procedure: RADIOFREQUENCY ABLATION LEFT L3,L4,L5;  Surgeon: Israel Hurtado MD;  Location: [facility] PAIN MGT;  Service: Pain Management;  Laterality: Left;  4/3 LM TO R/S    RADIOFREQUENCY ABLATION Right 11/27/2023    Procedure: RADIOFREQUENCY ABLATION RIGHT L3, 4, 5 1 OF 3;  Surgeon: Israel Hurtado MD;  Location: [facility];  Service: Pain Management;  Laterality: Right;    RADIOFREQUENCY ABLATION OF LUMBAR MEDIAL BRANCH NERVE AT SINGLE LEVEL Left 6/26/2018    Procedure: RADIOFREQUENCY ABLATION, NERVE, MEDIAL BRANCH, LUMBAR, 1 LEVEL;  Surgeon: Lina Wagner MD;  Location: [facility] PAIN MGT;  Service: Pain Management;  Laterality: Left;   Left Cooled RFA @ L2,3,4,5  [id number]  with Sedation    1 of 2    RADIOFREQUENCY ABLATION OF LUMBAR MEDIAL BRANCH NERVE AT SINGLE LEVEL Right 7/10/2018    Procedure: RADIOFREQUENCY ABLATION, NERVE, MEDIAL BRANCH, LUMBAR, 1 LEVEL;  Surgeon: Lina Wagner MD;  Location: [facility] MGT;  Service: Pain Management;  Laterality: Right;  RIght Cooled RFA @ L2,3,4,5  [id number] with Sedation    2 of 2    TRIGGER FINGER RELEASE Right 2017    TRIGGER FINGER RELEASE Left 7/29/2019    Procedure: RELEASE, TRIGGER FINGER, Left Thumb;  Surgeon: Velma Garcia MD;  Location: [facility] OR;  Service: Orthopedics;  Laterality: Left;  Local w/ MAC       Time Tracking:     PT Received On: 01/06/24  PT Start Time: 0909     PT Stop Time: 0925  PT Total Time (min): 16 min     Billable Minutes: Evaluation 1 procedure and Gait Training 8 min    Tomas Reynoso PT, DPT  1/6/2024

## 2024-01-06 NOTE — NURSING
Per MD Tavon pt okay to wear street clothes and ambulate in room as she pleases; pt okay with one IV; pt okay for Q4 VS and neuro check as pt to discharge home today pending vascular neurology assessment. Pt ambulating with no assistance; pt denies needs at this time.   WCTM.

## 2024-01-06 NOTE — ASSESSMENT & PLAN NOTE
Alivia Thomas is a 58 y.o. female with PMH of HTN, HLD, DM, morbid obesity, PADDY, depression, asthma, GERD that presents to the ED c/o acute onset mild R MCA syndrome. LKN ~2.5 hrs PTA. Also reported associated HA and neck pain. Of note, exam with some inconsistency in weakness on repeat testing. Exam significant for LUE/LLE weakness with drift, mild L facial droop, mild dysarthria, and decreased sensation to L side that resolves at midline. NIHSS 4. CTH/CTA unremarkable. No contraindications for thrombolytic therapy, discussed risk/benefit discussed with patient who verbalized understanding and is agreeable to treatment with tenecteplase. TNK given at 2035. Patient reported resolution of presenting symptoms s/p TNK administration. Not candidate for thrombectomy due to no LVO on CTA. Patient will be admitted to [facility] for close monitoring and observation post-TNK.    ECHO completed. EF 60-65%. Mild LAE noted. MRI is negative for acute intracranial pathology or high-grade stenosis. NAEON. Patient admitted to [facility] for post-TNK monitoring. Neuro exam stable. Patient's MRI negative for acute intracranial processes or recent infarct. NAEON. Patient admitted to [facility] for post-TNK monitoring. Neuro exam stable. Patient's MRI negative for acute intracranial processes or recent infarct.       Antithrombotics for secondary stroke prevention: Antiplatelets: None: Hold all Antithrombotics x 24 hours after IV Thrombolytic therapy administration    Statins for secondary stroke prevention and hyperlipidemia, if present:   Statins: Atorvastatin- 40 mg daily    Aggressive risk factor modification: HTN, DM, HLD, Diet, Exercise, Obesity     Rehab efforts: The patient has been evaluated by a stroke team provider and the therapy needs have been fully considered based off the presenting complaints and exam findings. The following therapy evaluations are needed: PT evaluate and treat, OT evaluate and treat, SLP evaluate and  treat    Diagnostics ordered/pending: None     VTE prophylaxis: Mechanical prophylaxis: Place SCDs  None: Reason for No Pharmacological VTE Prophylaxis: Holding x 24 hours s/p treatment with Thrombolytic therapy    BP parameters: Infarct: Post Thrombolytic therapy, SBP <180

## 2024-01-06 NOTE — NURSING
24 hour post TNK monitoring complete, NIH 0.         01/05/24 2035   NIH Stroke Scale   Interval 24 hrs post onset of symptoms +/- 20 mins   1a. Level of Consciousness 0-->Alert, keenly responsive   1b. LOC Questions 0-->Answers both questions correctly   1c. LOC Commands 0-->Performs both tasks correctly   2. Best Gaze 0-->Normal   3. Visual 0-->No visual loss   4. Facial Palsy 0-->Normal symmetrical movements   5a. Motor Arm, Left 0-->No drift, limb holds 90 (or 45) degrees for full 10 secs   5b. Motor Arm, Right 0-->No drift, limb holds 90 (or 45) degrees for full 10 secs   6a. Motor Leg, Left 0-->No drift, leg holds 30 degree position for full 5 secs   6b. Motor Leg, Right 0-->No drift, leg holds 30 degree position for full 5 secs   7. Limb Ataxia 0-->Absent   8. Sensory 0-->Normal, no sensory loss   9. Best Language 0-->No aphasia, normal   10. Dysarthria 0-->Normal   11. Extinction and Inattention (formerly Neglect) 0-->No abnormality   Total (NIH Stroke Scale) 0

## 2024-01-06 NOTE — SUBJECTIVE & OBJECTIVE
Ok to discharge per Vascular Neurology  Objective:     Vitals:  Temp: 97.4 °F (36.3 °C)  Pulse: 74  Rhythm: normal sinus rhythm  BP: (!) 149/82  MAP (mmHg): 109  Resp: 20  SpO2: 98 %    Temp  Min: 97.4 °F (36.3 °C)  Max: 98.8 °F (37.1 °C)  Pulse  Min: 55  Max: 83  BP  Min: 100/59  Max: 176/82  MAP (mmHg)  Min: 77  Max: 118  Resp  Min: 14  Max: 28  SpO2  Min: 94 %  Max: 99 %    01/05 0701 - 01/06 0700  In: 480 [P.O.:480]  Out: 100 [Urine:100]   Unmeasured Output  Urine Occurrence: 1  Stool Occurrence: 1  Pad Count: 2        Physical Exam  Vitals and nursing note reviewed.   Constitutional:       Appearance: Normal appearance.   HENT:      Head: Normocephalic.      Nose: Nose normal.      Mouth/Throat:      Mouth: Mucous membranes are moist.      Pharynx: Oropharynx is clear.   Eyes:      Pupils: Pupils are equal, round, and reactive to light.   Cardiovascular:      Rate and Rhythm: Normal rate and regular rhythm.      Pulses: Normal pulses.      Heart sounds: Normal heart sounds.   Pulmonary:      Effort: Pulmonary effort is normal.      Breath sounds: Normal breath sounds.   Abdominal:      General: Bowel sounds are normal.      Palpations: Abdomen is soft.   Musculoskeletal:         General: Normal range of motion.   Skin:     General: Skin is warm and dry.      Capillary Refill: Capillary refill takes 2 to 3 seconds.   Neurological:      Mental Status: She is alert.       Constitutional:       Appearance: resting in bed in no distress, alert and well-nourished  Cardiovascular:      Rate and Rhythm: Normal rate and regular rhythm.  Pulses intact x4  Pulmonary:      Effort: Pulmonary effort is normal. No respiratory distress.   Abdominal:      Palpations: Abdomen is soft, non-tender   Neurological:   [id number]  Follows all commands and responds to questions appropriately, repetition and naming intact. Face symmetric and no dysarthria, EOMi, PERRL  Visual fields intact  5/5 motor score throughout without  drift  Sensation intact to light touch  No dysmetria/ataxia to bedside testing    NIHSS 0        Medications:  Continuous ScheduledallopurinoL, 300 mg, Q12H  aspirin, 81 mg, Daily  atorvastatin, 80 mg, Daily  cyanocobalamin, 250 mcg, Daily  famotidine, 20 mg, BID  FLUoxetine, 20 mg, Daily  heparin (porcine), 5,000 Units, Q8H  oxybutynin, 5 mg, Daily  senna-docusate 8.6-50 mg, 1 tablet, Daily    PRNacetaminophen, 650 mg, Q6H PRN  albuterol, 2 puff, Q6H PRN  dextrose 10%, 12.5 g, PRN  dextrose 10%, 25 g, PRN  diclofenac sodium, 2 g, QID PRN  glucagon (human recombinant), 1 mg, PRN  glucose, 16 g, PRN  glucose, 24 g, PRN  hydrALAZINE, 10 mg, Q4H PRN  insulin aspart U-100, 0-10 Units, QID (AC + HS) PRN  labetalol, 10 mg, Q4H PRN  sodium chloride 0.9%, 10 mL, PRN      Today I personally reviewed pertinent medications, lines/drains/airways, imaging, cardiology results, laboratory results, microbiology results,     Diet  Diet diabetic 2000 Calorie  Diet diabetic 2000 Calorie        Review of Systems   Constitutional: Negative.    HENT: Negative.     Eyes: Negative.    Respiratory: Negative.     Cardiovascular: Negative.    Gastrointestinal: Negative.    Endocrine: Negative.    Genitourinary: Negative.    Musculoskeletal: Negative.    Neurological: Negative.

## 2024-01-06 NOTE — DISCHARGE SUMMARY
[facility]  Vascular Neurology  [facility]  Discharge Summary     Summary:     Admit Date: 1/4/2024  7:57 PM    Discharge Date and Time:  01/06/2024 11:23 AM    Attending Physician: Ranjit Montes De Oca DO     Discharge Provider: Marco Chacon MD    History of Present Illness: Alivia Thomas is a 58 y.o. female with PMH of HTN, HLD, DM, morbid obesity, PADDY, depression, asthma, GERD that presents to the ED c/o acute onset L hemiparesis, L facial droop, L paresthesia, and dysarthria. LKN 5:30pm, approx. 2.5 hrs PTA. Patient also reports associated HA and neck pain. Denies history of similar episodes, CP, SOB, difficulty swallowing, or new vision changes. Of note, exam with some inconsistency in weakness on repeat testing. Exam significant for LUE/LLE weakness with drift, mild L facial droop, mild dysarthria, and decreased sensation to L side that resolves at midline. NIHSS 4. CTH/CTA stroke multiphase unremarkable for acute hemorrhage, LVO or critical stenosis. No contraindications for thrombolytic therapy, discussed risk/benefit discussed with patient who verbalized understanding and is agreeable to treatment with tenecteplase. TNK given at 2035. Not candidate for thrombectomy due to no LVO on CTA. Patient will be admitted to [facility] for close monitoring and observation post-TNK. Patient w/ resolution of symptoms and no issues during monitoring period s/p TNK. Patient to f/u with neurology in clinic in 1 month    Hospital Course (synopsis of major diagnoses, care, treatment, and services provided during the course of the hospital stay): 01/05/2024 NAEON. Patient admitted to [facility] for post-TNK monitoring. Neuro exam stable. Patient's MRI negative for acute intracranial processes or recent infarct. Patient w/ resolution of symptoms following TNK, and stable during surveillance period. Patient deemed medically stable for discharge. Patient to continue aspirin/statin therapy out-patient and  to follow-up with primary care physician at regular visit for further medical care.       Goals of Care Treatment Preferences:  Code Status: Full Code    Living Will: Yes              Stroke Etiology: Ischemic Small Vessel Disease ([place])    STROKE DOCUMENTATION   Acute Stroke Times   Last Known Normal Date: 01/04/24  Last Known Normal Time: 1730  Symptom Onset Date: 01/04/24  Symptom Onset Time: 1740  Stroke Team Called Date: 01/04/24  Stroke Team Called Time: 1958  Stroke Team Arrival Date: 01/04/24  Stroke Team Arrival Time: 2003  CT Interpretation Time: 2019  Thrombolytic Therapy Recommended: Yes  CTA Interpretation Time: 2030  Thrombectomy Recommended: No  Decision to Treat Time for Tenecteplase: 2020     NIH Scale:  1a. Level of Consciousness: 0-->Alert, keenly responsive  1b. LOC Questions: 0-->Answers both questions correctly  1c. LOC Commands: 0-->Performs both tasks correctly  2. Best Gaze: 0-->Normal  3. Visual: 0-->No visual loss  4. Facial Palsy: 0-->Normal symmetrical movements  5a. Motor Arm, Left: 0-->No drift, limb holds 90 (or 45) degrees for full 10 secs  5b. Motor Arm, Right: 0-->No drift, limb holds 90 (or 45) degrees for full 10 secs  6a. Motor Leg, Left: 0-->No drift, leg holds 30 degree position for full 5 secs  6b. Motor Leg, Right: 0-->No drift, leg holds 30 degree position for full 5 secs  7. Limb Ataxia: 0-->Absent  8. Sensory: 0-->Normal, no sensory loss  9. Best Language: 0-->No aphasia, normal  10. Dysarthria: 0-->Normal  11. Extinction and Inattention (formerly Neglect): 0-->No abnormality  Total (NIH Stroke Scale): 0        Modified [place] Score: 0  [place] Coma Scale:    ABCD2 Score:    [id number] Score:   HAS -BLED Score:   ICH Score:   Hunt & Rosen Classification:       Assessment/Plan:     Diagnostic Results:    Brain/Vessel Imaging:  MRI Brain w/o Contrast 1/5/24  Impression:  No evidence of recent infarction or other acute intracranial pathology.     CTA Stroke Multi-Phase  1/4/24  Impression:  No acute abnormality. No high-grade stenosis or major vessel occlusion. If the patient has an acute, focal neurological deficit, MRI of the brain may be indicated.     Cardiac Imaging   ECHO 1/5/24       Left Ventricle The left ventricle is normal in size. Ventricular mass is normal. Normal wall thickness. Normal wall motion. There is normal systolic function with a visually estimated ejection fraction of 60 - 65%. Ejection fraction by visual approximation is 63%. There is normal diastolic function.   Right Ventricle Normal right ventricular cavity size. Wall thickness is normal. Right ventricle wall motion  is normal. Systolic function is normal.   Left Atrium Left atrium is mildly dilated.   Right Atrium Right atrium is mildly dilated.   Aortic Valve The aortic valve is a trileaflet valve. There is aortic valve sclerosis. There is normal leaflet mobility. Aortic valve peak velocity is 1.53 m/s. Mean gradient is 5 mmHg. There is no significant regurgitation.   Mitral Valve The mitral valve is structurally normal. There is normal leaflet mobility. There is trace regurgitation.   Tricuspid Valve The tricuspid valve is structurally normal. There is normal leaflet mobility. There is mild regurgitation.   Pulmonic Valve The pulmonic valve is structurally normal. There is normal leaflet mobility. There is no significant regurgitation.   IVC/SVC Normal venous pressure at 3 mmHg.   Ascending Aorta Aortic root is normal in size measuring 3.30 cm. Ascending aorta is normal measuring 3.36 cm.   Pericardium and Other Findings There is no pericardial effusion.   Pulmonary Artery The estimated pulmonary artery systolic pressure is 32 mmHg.      Interventions: IV Thrombolytic, Diagnostic Angiography    Complications: None    Disposition:     Final Active Diagnoses:    Diagnosis Date Noted POA    PRINCIPAL PROBLEM:  Acute right MCA stroke [I63.511] 01/04/2024 Yes    Received tissue plasminogen activator (t-PA)  less than 24 hours prior to arrival [Z92.82] 01/04/2024 Not Applicable    Impaired functional mobility, balance, gait, and endurance [Z74.09] 09/25/2023 Yes    Gout [M10.9] 06/15/2022 Yes     Chronic    GERD (gastroesophageal reflux disease) [K21.9] 03/27/2019 Yes     Chronic    Recurrent major depressive disorder, in partial remission [F33.41] 01/28/2019 Yes    Chronic pain [G89.29] 12/27/2018 Yes     Chronic    Mild intermittent asthma without complication [J45.20]  Yes     Chronic    Hyperlipemia [E78.5] 10/01/2012 Yes     Chronic    Type 2 diabetes mellitus without complication, without long-term current use of insulin [E11.9] 08/14/2012 Yes     Chronic    PADDY (obstructive sleep apnea) [G47.33]  Yes     Chronic    Morbid obesity [E66.01]  Yes      Problems Resolved During this Admission:     Neuro  * Acute right MCA stroke  Alivia Thomas is a 58 y.o. female with PMH of HTN, HLD, DM, morbid obesity, PADDY, depression, asthma, GERD that presents to the ED c/o acute onset mild R MCA syndrome. LKN ~2.5 hrs PTA. Also reported associated HA and neck pain. Of note, exam with some inconsistency in weakness on repeat testing. Exam significant for LUE/LLE weakness with drift, mild L facial droop, mild dysarthria, and decreased sensation to L side that resolves at midline. NIHSS 4. CTH/CTA unremarkable. No contraindications for thrombolytic therapy, discussed risk/benefit discussed with patient who verbalized understanding and is agreeable to treatment with tenecteplase. TNK given at 2035. Patient reported resolution of presenting symptoms s/p TNK administration. Not candidate for thrombectomy due to no LVO on CTA. Patient will be admitted to [facility] for close monitoring and observation post-TNK.    ECHO completed. EF 60-65%. Mild LAE noted. MRI is negative for acute intracranial pathology or high-grade stenosis. NAEON. Patient admitted to [facility] for post-TNK monitoring. Neuro exam stable. Patient's MRI negative for acute  intracranial processes or recent infarct. NAEON. Patient admitted to [facility] for post-TNK monitoring. Neuro exam stable. Patient's MRI negative for acute intracranial processes or recent infarct.       Antithrombotics for secondary stroke prevention: Antiplatelets: None: Hold all Antithrombotics x 24 hours after IV Thrombolytic therapy administration    Statins for secondary stroke prevention and hyperlipidemia, if present:   Statins: Atorvastatin- 40 mg daily    Aggressive risk factor modification: HTN, DM, HLD, Diet, Exercise, Obesity     Rehab efforts: The patient has been evaluated by a stroke team provider and the therapy needs have been fully considered based off the presenting complaints and exam findings. The following therapy evaluations are needed: PT evaluate and treat, OT evaluate and treat, SLP evaluate and treat    Diagnostics ordered/pending: None     VTE prophylaxis: Mechanical prophylaxis: Place SCDs  None: Reason for No Pharmacological VTE Prophylaxis: Holding x 24 hours s/p treatment with Thrombolytic therapy    BP parameters: Infarct: Post Thrombolytic therapy, SBP <180          Recommendations:     Post-discharge complication risks: Falls    Stroke Education given to: patient    Follow-up in Stroke Clinic in 4-6 weeks.     Discharge Plan:  Antithrombotics: Aspirin 81mg  Statin: Atorvastatin 80mg  Aggresive risk factor modification:  Hypertension  Diabetes  High Cholesterol  Diet  Exercise  Obesity    Follow Up:      Patient Instructions:      Ambulatory referral/consult to Neurology   Standing Status: Future   Referral Priority: Routine Referral Type: Consultation   Referral Reason: Specialty Services Required   Requested Specialty: Neurology   Number of Visits Requested: 1       Medications:  Reconciled Home Medications:      Medication List        START taking these medications      aspirin 81 MG Chew  Take 1 tablet (81 mg total) by mouth once daily.  Start taking on: January 7, 2024             CHANGE how you take these medications      atorvastatin 80 MG tablet  Commonly known as: LIPITOR  Take 1 tablet (80 mg total) by mouth once daily.  Start taking on: January 7, 2024  What changed:   medication strength  how much to take            CONTINUE taking these medications      albuterol 90 mcg/actuation inhaler  Commonly known as: VENTOLIN HFA  INHALE TWO PUFFS INTO LUNGS EVERY 4 TO 6 HOURS AS NEEDED FOR SHORTNESS OF BREATH AND FOR WHEEZING     allopurinoL 300 MG tablet  Commonly known as: ZYLOPRIM  Take 1 tablet (300 mg total) by mouth 2 (two) times daily.     b complex vitamins tablet  Take 1 tablet by mouth every other day.     ISIAH-CITRATE ORAL  Take 500 mg by mouth 2 (two) times daily.     cyanocobalamin 1000 MCG tablet  Commonly known as: VITAMIN B-12  Take 100 mcg by mouth every morning.     diclofenac sodium 1 % Gel  Commonly known as: VOLTAREN  Apply 2 g topically 4 (four) times daily as needed. To painful area on the feet.     FLUoxetine 20 mg/5 mL (4 mg/mL) solution  Commonly known as: PROZAC  TAKE 5 MLS BY MOUTH ONCE DAILY.     fluticasone propionate 50 mcg/actuation nasal spray  Commonly known as: FLONASE  1 SPRAY BY EACH NOSTRIL ROUTE 2 TIMES DAILY AS NEEDED FOR RHINITIS.     LIDOcaine 5 % Oint ointment  Commonly known as: XYLOCAINE  APPLY TOPICALLY AS NEEDED.     MITIGARE 0.6 mg Cap  Generic drug: colchicine  TAKE 1 CAPSULE (0.6 MG TOTAL) BY MOUTH DAILY AS NEEDED (FLARE UP).     MOUNJARO 2.5 mg/0.5 mL Pnij  Generic drug: tirzepatide  INJECT 2.5 MG INTO THE SKIN EVERY 7 DAYS.     multivit-iron-min-folic acid 3,500-18-0.4 unit-mg-mg Chew  Take 1 tablet by mouth 2 (two) times daily.     nystatin powder  Commonly known as: MYCOSTATIN  Apply topically 2 (two) times daily.     omeprazole 20 MG capsule  Commonly known as: PRILOSEC  TAKE 1 CAPSULE (20 MG TOTAL) BY MOUTH EVERY MORNING.     oxybutynin 5 MG Tr24  Commonly known as: DITROPAN-XL  TAKE 1 TABLET BY MOUTH ONCE DAILY.     tiZANidine 4 MG  tablet  Commonly known as: ZANAFLEX  TAKE 1 TABLET (4 MG TOTAL) BY MOUTH EVERY 8 (EIGHT) HOURS AS NEEDED (MUSCLE PAIN).     tretinoin microspheres 0.08 % Glwp  Commonly known as: RETIN-A MICRO PUMP  Apply to affected area daily     urea 40 % Crea  Commonly known as: CARMOL  Apply topically once daily. To dry skin on the feet.     vitamin D 1000 units Tab  Commonly known as: VITAMIN D3  Take 5,000 mg by mouth every morning.            STOP taking these medications      indomethacin 50 MG capsule  Commonly known as: INDOCIN     levoFLOXacin 500 MG tablet  Commonly known as: LEVAQUIN     oxyCODONE-acetaminophen  mg per tablet  Commonly known as: PERCOCET              Marco Chacon MD  [facility]  Department of Vascular Neurology   [facility]

## 2024-01-06 NOTE — SUBJECTIVE & OBJECTIVE
Neurologic Chief Complaint: acute R MCA syndrome    Subjective:     Interval History: Patient is seen for follow-up neurological assessment and treatment recommendations: ALETHEA. Patient admitted to [facility] for post-TNK monitoring. Neuro exam stable. Patient's MRI negative for acute intracranial processes or recent infarct. ECHO completed. EF 60-65%. Mild LAE noted.     HPI, Past Medical, Family, and Social History remains the same as documented in the initial encounter.     Review of Systems   HENT: Negative.     Respiratory: Negative.     Cardiovascular: Negative.    Gastrointestinal: Negative.    Neurological: Negative.      Scheduled Meds:   allopurinoL  300 mg Oral Q12H    aspirin  81 mg Oral Daily    atorvastatin  40 mg Oral Daily    cyanocobalamin  250 mcg Oral Daily    famotidine  20 mg Oral BID    FLUoxetine  20 mg Oral Daily    heparin (porcine)  5,000 Units Subcutaneous Q8H    oxybutynin  5 mg Oral Daily    senna-docusate 8.6-50 mg  1 tablet Oral Daily     Continuous Infusions:  PRN Meds:acetaminophen, albuterol, dextrose 10%, dextrose 10%, diclofenac sodium, glucagon (human recombinant), glucose, glucose, hydrALAZINE, insulin aspart U-100, labetalol, sodium chloride 0.9%    Objective:     Vital Signs (Most Recent):  Temp: 98.8 °F (37.1 °C) (01/05/24 1935)  Pulse: 77 (01/05/24 1935)  Resp: 18 (01/05/24 1935)  BP: (!) 112/57 (01/05/24 1935)  SpO2: 97 % (01/05/24 1935)  BP Location: Right arm    Vital Signs Range (Last 24H):  Temp:  [97.9 °F (36.6 °C)-98.8 °F (37.1 °C)]   Pulse:  [57-90]   Resp:  [11-28]   BP: (112-172)/(57-97)   SpO2:  [93 %-100 %]   BP Location: Right arm       Physical Exam  HENT:      Head: Normocephalic.      Mouth/Throat:      Mouth: Mucous membranes are moist.   Eyes:      Extraocular Movements: Extraocular movements intact.   Cardiovascular:      Rate and Rhythm: Normal rate.      Pulses: Normal pulses.   Pulmonary:      Effort: Pulmonary effort is normal.   Abdominal:      Palpations:  "Abdomen is soft.   Skin:     General: Skin is warm.   Neurological:      Mental Status: She is alert and oriented to person, place, and time.              Neurological Exam:   LOC: alert  Attention Span: Good   Language: No aphasia  Articulation: No dysarthria  Orientation: Person, Place, Time   Visual Fields: Full  EOM (CN III, IV, VI): Full/intact  Facial Sensation (CN V): Normal  Facial Movement (CN VII): Symmetric facial expression    Motor: Arm left  Normal 5/5  Leg left  Normal 5/5  Arm right  Normal 5/5  Leg right Normal 5/5  Cerebellum: No evidence of appendicular or axial ataxia  Sensation: intact to light touch  Tone: Normal tone throughout    Laboratory:  CMP:   Recent Labs   Lab 01/05/24  0013   CALCIUM 8.7   ALBUMIN 3.3*   PROT 6.9      K 4.0   CO2 22*      BUN 20   CREATININE 0.6   ALKPHOS 53*   ALT 10   AST 15   BILITOT 0.5     BMP:   Recent Labs   Lab 01/05/24  0013      K 4.0      CO2 22*   BUN 20   CREATININE 0.6   CALCIUM 8.7     CBC:   Recent Labs   Lab 01/05/24  0013   WBC 5.94   RBC 3.77*   HGB 11.9*   HCT 35.8*      MCV 95   MCH 31.6*   MCHC 33.2     Lipid Panel:   Recent Labs   Lab 01/04/24 2031   CHOL 225*   LDLCALC 152.6   HDL 60   TRIG 62     Coagulation:   Recent Labs   Lab 01/04/24 2031   INR 1.0     Platelet Aggregation Study: No results for input(s): "PLTAGG", "PLTAGINTERP", "PLTAGREGLACO", "ADPPLTAGGREG" in the last 168 hours.  Hgb A1C:   Recent Labs   Lab 01/05/24  0013   HGBA1C 6.2*     TSH:   Recent Labs   Lab 01/04/24 2031   TSH 0.909       Diagnostic Results     Brain/Vessel Imaging:  MRI Brain w/o Contrast 1/5/24  Impression:  No evidence of recent infarction or other acute intracranial pathology.    CTA Stroke Multi-Phase 1/4/24  Impression:  No acute abnormality. No high-grade stenosis or major vessel occlusion. If the patient has an acute, focal neurological deficit, MRI of the brain may be indicated.    Cardiac Imaging   ECHO 1/5/24  Left " Ventricle The left ventricle is normal in size. Ventricular mass is normal. Normal wall thickness. Normal wall motion. There is normal systolic function with a visually estimated ejection fraction of 60 - 65%. Ejection fraction by visual approximation is 63%. There is normal diastolic function.   Right Ventricle Normal right ventricular cavity size. Wall thickness is normal. Right ventricle wall motion  is normal. Systolic function is normal.   Left Atrium Left atrium is mildly dilated.   Right Atrium Right atrium is mildly dilated.   Aortic Valve The aortic valve is a trileaflet valve. There is aortic valve sclerosis. There is normal leaflet mobility. Aortic valve peak velocity is 1.53 m/s. Mean gradient is 5 mmHg. There is no significant regurgitation.   Mitral Valve The mitral valve is structurally normal. There is normal leaflet mobility. There is trace regurgitation.   Tricuspid Valve The tricuspid valve is structurally normal. There is normal leaflet mobility. There is mild regurgitation.   Pulmonic Valve The pulmonic valve is structurally normal. There is normal leaflet mobility. There is no significant regurgitation.   IVC/SVC Normal venous pressure at 3 mmHg.   Ascending Aorta Aortic root is normal in size measuring 3.30 cm. Ascending aorta is normal measuring 3.36 cm.   Pericardium and Other Findings There is no pericardial effusion.   Pulmonary Artery The estimated pulmonary artery systolic pressure is 32 mmHg.

## 2024-01-06 NOTE — ASSESSMENT & PLAN NOTE
-Received TNK in ED 1/4/2024 @ 2035  -Admitted to [facility] for close post IV thrombolytic monitoring  -Hold all AC/AP x 24hrs post-TNK  -Stat CTH for any acute neuro exam changes

## 2024-01-06 NOTE — PROGRESS NOTES
[facility]  Vascular Neurology  Comprehensive Stroke Center  Progress Note    Assessment/Plan:     * Acute right MCA stroke  Alivia Thomas is a 58 y.o. female with PMH of HTN, HLD, DM, morbid obesity, PADDY, depression, asthma, GERD that presents to the ED c/o acute onset mild R MCA syndrome. LKN ~2.5 hrs PTA. Also reported associated HA and neck pain. Of note, exam with some inconsistency in weakness on repeat testing. Exam significant for LUE/LLE weakness with drift, mild L facial droop, mild dysarthria, and decreased sensation to L side that resolves at midline. NIHSS 4. CTH/CTA unremarkable. No contraindications for thrombolytic therapy, discussed risk/benefit discussed with patient who verbalized understanding and is agreeable to treatment with tenecteplase. TNK given at 2035. Patient reported resolution of presenting symptoms s/p TNK administration. Not candidate for thrombectomy due to no LVO on CTA. Patient will be admitted to [facility] for close monitoring and observation post-TNK.    ECHO completed. EF 60-65%. Mild LAE noted. MRI is negative for acute intracranial pathology or high-grade stenosis. NAEON. Patient admitted to [facility] for post-TNK monitoring. Neuro exam stable. Patient's MRI negative for acute intracranial processes or recent infarct. NAEON. Patient admitted to [facility] for post-TNK monitoring. Neuro exam stable. Patient's MRI negative for acute intracranial processes or recent infarct.       Antithrombotics for secondary stroke prevention: Antiplatelets: None: Hold all Antithrombotics x 24 hours after IV Thrombolytic therapy administration    Statins for secondary stroke prevention and hyperlipidemia, if present:   Statins: Atorvastatin- 40 mg daily    Aggressive risk factor modification: HTN, DM, HLD, Diet, Exercise, Obesity     Rehab efforts: The patient has been evaluated by a stroke team provider and the therapy needs have been fully considered based off the presenting complaints  and exam findings. The following therapy evaluations are needed: PT evaluate and treat, OT evaluate and treat, SLP evaluate and treat    Diagnostics ordered/pending: None     VTE prophylaxis: Mechanical prophylaxis: Place SCDs  None: Reason for No Pharmacological VTE Prophylaxis: Holding x 24 hours s/p treatment with Thrombolytic therapy    BP parameters: Infarct: Post Thrombolytic therapy, SBP <180      Received tissue plasminogen activator (t-PA) less than 24 hours prior to arrival  -Received TNK in ED 1/4/2024 @ 2035  -Admitted to [facility] for close post IV thrombolytic monitoring  -Hold all AC/AP x 24hrs post-TNK  -Stat CTH for any acute neuro exam changes    Gout  -Allopurinol 300mg tab BID.    GERD (gastroesophageal reflux disease)  -Famotidine 20mg BID    Recurrent major depressive disorder, in partial remission  -Continue home fluoxetine    Hyperlipemia  -Stroke risk factor  -.6  -Atorvastatin 40mg daily for LDL goal <70  -Consider increasing to Atorvastatin 80mg    Type 2 diabetes mellitus without complication, without long-term current use of insulin  -Stroke risk factor  -A1c 6.2  -BG goal while inpatient 140-180  -SSI PRN    PADDY (obstructive sleep apnea)  -Stroke risk factor  -Consider CPAP qHS    Morbid obesity  -Stroke risk factor  - on healthy diet and lifestyle  -Consider nutrition consult         01/05/2024 NAEON. Patient admitted to [facility] for post-TNK monitoring. Neuro exam stable. Patient's MRI negative for acute intracranial processes or recent infarct.       STROKE DOCUMENTATION   Acute Stroke Times   Last Known Normal Date: 01/04/24  Last Known Normal Time: 1730  Symptom Onset Date: 01/04/24  Symptom Onset Time: 1740  Stroke Team Called Date: 01/04/24  Stroke Team Called Time: 1958  Stroke Team Arrival Date: 01/04/24  Stroke Team Arrival Time: 2003  CT Interpretation Time: 2019  Thrombolytic Therapy Recommended: Yes  CTA Interpretation Time: 2030  Thrombectomy Recommended: No  Decision  to Treat Time for Tenecteplase: 2020    NIH Scale:  1a. Level of Consciousness: 0-->Alert, keenly responsive  1b. LOC Questions: 0-->Answers both questions correctly  1c. LOC Commands: 0-->Performs both tasks correctly  2. Best Gaze: 0-->Normal  3. Visual: 0-->No visual loss  4. Facial Palsy: 0-->Normal symmetrical movements  5a. Motor Arm, Left: 0-->No drift, limb holds 90 (or 45) degrees for full 10 secs  5b. Motor Arm, Right: 0-->No drift, limb holds 90 (or 45) degrees for full 10 secs  6a. Motor Leg, Left: 0-->No drift, leg holds 30 degree position for full 5 secs  6b. Motor Leg, Right: 0-->No drift, leg holds 30 degree position for full 5 secs  7. Limb Ataxia: 0-->Absent  8. Sensory: 0-->Normal, no sensory loss  9. Best Language: 0-->No aphasia, normal  10. Dysarthria: 0-->Normal  11. Extinction and Inattention (formerly Neglect): 0-->No abnormality  Total (NIH Stroke Scale): 0       Modified Delta Score: 0  Leesa Coma Scale:15   ABCD2 Score:    [id number] Score:   HAS -BLED Score:   ICH Score:   Hunt & Rosen Classification:      Hemorrhagic change of an Ischemic Stroke: Does this patient have an ischemic stroke with hemorrhagic changes? No     Neurologic Chief Complaint: acute R MCA syndrome    Subjective:     Interval History: Patient is seen for follow-up neurological assessment and treatment recommendations: DONGON. Patient admitted to [facility] for post-TNK monitoring. Neuro exam stable. Patient's MRI negative for acute intracranial processes or recent infarct. ECHO completed. EF 60-65%. Mild LAE noted.     HPI, Past Medical, Family, and Social History remains the same as documented in the initial encounter.     Review of Systems   HENT: Negative.     Respiratory: Negative.     Cardiovascular: Negative.    Gastrointestinal: Negative.    Neurological: Negative.      Scheduled Meds:   allopurinoL  300 mg Oral Q12H    aspirin  81 mg Oral Daily    atorvastatin  40 mg Oral Daily    cyanocobalamin  250 mcg Oral  Daily    famotidine  20 mg Oral BID    FLUoxetine  20 mg Oral Daily    heparin (porcine)  5,000 Units Subcutaneous Q8H    oxybutynin  5 mg Oral Daily    senna-docusate 8.6-50 mg  1 tablet Oral Daily     Continuous Infusions:  PRN Meds:acetaminophen, albuterol, dextrose 10%, dextrose 10%, diclofenac sodium, glucagon (human recombinant), glucose, glucose, hydrALAZINE, insulin aspart U-100, labetalol, sodium chloride 0.9%    Objective:     Vital Signs (Most Recent):  Temp: 98.8 °F (37.1 °C) (01/05/24 1935)  Pulse: 77 (01/05/24 1935)  Resp: 18 (01/05/24 1935)  BP: (!) 112/57 (01/05/24 1935)  SpO2: 97 % (01/05/24 1935)  BP Location: Right arm    Vital Signs Range (Last 24H):  Temp:  [97.9 °F (36.6 °C)-98.8 °F (37.1 °C)]   Pulse:  [57-90]   Resp:  [11-28]   BP: (112-172)/(57-97)   SpO2:  [93 %-100 %]   BP Location: Right arm       Physical Exam  HENT:      Head: Normocephalic.      Mouth/Throat:      Mouth: Mucous membranes are moist.   Eyes:      Extraocular Movements: Extraocular movements intact.   Cardiovascular:      Rate and Rhythm: Normal rate.      Pulses: Normal pulses.   Pulmonary:      Effort: Pulmonary effort is normal.   Abdominal:      Palpations: Abdomen is soft.   Skin:     General: Skin is warm.   Neurological:      Mental Status: She is alert and oriented to person, place, and time.              Neurological Exam:   LOC: alert  Attention Span: Good   Language: No aphasia  Articulation: No dysarthria  Orientation: Person, Place, Time   Visual Fields: Full  EOM (CN III, IV, VI): Full/intact  Facial Sensation (CN V): Normal  Facial Movement (CN VII): Symmetric facial expression    Motor: Arm left  Normal 5/5  Leg left  Normal 5/5  Arm right  Normal 5/5  Leg right Normal 5/5  Cerebellum: No evidence of appendicular or axial ataxia  Sensation: intact to light touch  Tone: Normal tone throughout    Laboratory:  CMP:   Recent Labs   Lab 01/05/24  0013   CALCIUM 8.7   ALBUMIN 3.3*   PROT 6.9      K 4.0  "  CO2 22*      BUN 20   CREATININE 0.6   ALKPHOS 53*   ALT 10   AST 15   BILITOT 0.5     BMP:   Recent Labs   Lab 01/05/24  0013      K 4.0      CO2 22*   BUN 20   CREATININE 0.6   CALCIUM 8.7     CBC:   Recent Labs   Lab 01/05/24  0013   WBC 5.94   RBC 3.77*   HGB 11.9*   HCT 35.8*      MCV 95   MCH 31.6*   MCHC 33.2     Lipid Panel:   Recent Labs   Lab 01/04/24 2031   CHOL 225*   LDLCALC 152.6   HDL 60   TRIG 62     Coagulation:   Recent Labs   Lab 01/04/24 2031   INR 1.0     Platelet Aggregation Study: No results for input(s): "PLTAGG", "PLTAGINTERP", "PLTAGREGLACO", "ADPPLTAGGREG" in the last 168 hours.  Hgb A1C:   Recent Labs   Lab 01/05/24 0013   HGBA1C 6.2*     TSH:   Recent Labs   Lab 01/04/24 2031   TSH 0.909       Diagnostic Results     Brain/Vessel Imaging:  MRI Brain w/o Contrast 1/5/24  Impression:  No evidence of recent infarction or other acute intracranial pathology.    CTA Stroke Multi-Phase 1/4/24  Impression:  No acute abnormality. No high-grade stenosis or major vessel occlusion. If the patient has an acute, focal neurological deficit, MRI of the brain may be indicated.    Cardiac Imaging   ECHO 1/5/24  Left Ventricle The left ventricle is normal in size. Ventricular mass is normal. Normal wall thickness. Normal wall motion. There is normal systolic function with a visually estimated ejection fraction of 60 - 65%. Ejection fraction by visual approximation is 63%. There is normal diastolic function.   Right Ventricle Normal right ventricular cavity size. Wall thickness is normal. Right ventricle wall motion  is normal. Systolic function is normal.   Left Atrium Left atrium is mildly dilated.   Right Atrium Right atrium is mildly dilated.   Aortic Valve The aortic valve is a trileaflet valve. There is aortic valve sclerosis. There is normal leaflet mobility. Aortic valve peak velocity is 1.53 m/s. Mean gradient is 5 mmHg. There is no significant regurgitation.   Mitral " Valve The mitral valve is structurally normal. There is normal leaflet mobility. There is trace regurgitation.   Tricuspid Valve The tricuspid valve is structurally normal. There is normal leaflet mobility. There is mild regurgitation.   Pulmonic Valve The pulmonic valve is structurally normal. There is normal leaflet mobility. There is no significant regurgitation.   IVC/SVC Normal venous pressure at 3 mmHg.   Ascending Aorta Aortic root is normal in size measuring 3.30 cm. Ascending aorta is normal measuring 3.36 cm.   Pericardium and Other Findings There is no pericardial effusion.   Pulmonary Artery The estimated pulmonary artery systolic pressure is 32 mmHg.       Lotus Urbina NP  [facility]  Department of Vascular Neurology   [facility]

## 2024-01-06 NOTE — NURSING
Pt discharged home. Discharge summary reviewed with pt; pt verbalized understanding. IV removed and pt disconnected from monitor. Wheeled pt down in wheelchair to retrieve meds from pharmacy and then out to front door where she left with her wife.

## 2024-01-06 NOTE — ASSESSMENT & PLAN NOTE
57 y/o F presenting with R MCA syndrome s/p tPA 1/4/2024 dose completed @20:30 per report. No LVO noted on CTA.     -Admitted to [facility], stroke following  -q1h neuro checks, vital checks for 24 hours per protocol  -EKG, ECHO, CXR completed  -A1c, TSH, lipid panel, coag panel  -Daily CBC, CMP, mag, phos  -SBP < 180, prn labetalol and hydralazine  -Statin  -SCD, Hold ACAP in post TNK setting x 24 hours  -PT/OT/SLP  -MRI pending ( see Read)  - ok to discharge per vascular neurology

## 2024-01-06 NOTE — ASSESSMENT & PLAN NOTE
Alivia Thomas is a 58 y.o. female with PMH of HTN, HLD, DM, morbid obesity, PADDY, depression, asthma, GERD that presents to the ED c/o acute onset mild R MCA syndrome. LKN ~2.5 hrs PTA. Also reported associated HA and neck pain. Of note, exam with some inconsistency in weakness on repeat testing. Exam significant for LUE/LLE weakness with drift, mild L facial droop, mild dysarthria, and decreased sensation to L side that resolves at midline. NIHSS 4. CTH/CTA unremarkable. No contraindications for thrombolytic therapy, discussed risk/benefit discussed with patient who verbalized understanding and is agreeable to treatment with tenecteplase. TNK given at 2035. Patient reported resolution of presenting symptoms s/p TNK administration. Not candidate for thrombectomy due to no LVO on CTA. Patient will be admitted to [facility] for close monitoring and observation post-TNK.    ECHO completed. EF 60-65%. Mild LAE noted. MRI is negative for acute intracranial pathology or high-grade stenosis. NAEON. Patient admitted to [facility] for post-TNK monitoring. Neuro exam stable. Patient's MRI negative for acute intracranial processes or recent infarct. NAEON. Patient admitted to [facility] for post-TNK monitoring. Neuro exam stable. Patient's MRI negative for acute intracranial processes or recent infarct.       Antithrombotics for secondary stroke prevention: Antiplatelets: None: Hold all Antithrombotics x 24 hours after IV Thrombolytic therapy administration    Statins for secondary stroke prevention and hyperlipidemia, if present:   Statins: Atorvastatin- 40 mg daily    Aggressive risk factor modification: HTN, DM, HLD, Diet, Exercise, Obesity     Rehab efforts: The patient has been evaluated by a stroke team provider and the therapy needs have been fully considered based off the presenting complaints and exam findings. The following therapy evaluations are needed: PT evaluate and treat, OT evaluate and treat, SLP evaluate and  treat    Diagnostics ordered/pending: None     VTE prophylaxis: Mechanical prophylaxis: Place SCDs  None: Reason for No Pharmacological VTE Prophylaxis: Holding x 24 hours s/p treatment with Thrombolytic therapy    BP parameters: Infarct: Post Thrombolytic therapy, SBP <180

## 2024-01-06 NOTE — ASSESSMENT & PLAN NOTE
-Stroke risk factor  -.6  -Atorvastatin 40mg daily for LDL goal <70  -Consider increasing to Atorvastatin 80mg

## 2024-01-06 NOTE — PROGRESS NOTES
[facility] - Neuro Critical Care  Neurocritical Care  Progress Note    Admit Date: 1/4/2024  Service Date: 01/06/2024  Length of Stay: 2    Subjective:     Chief Complaint: Acute right MCA stroke    History of Present Illness: Ms. Alivia Thomas is a 58 y.o. female with PMH of HTN, HLD, DM, morbid obesity, PADDY, depression, asthma, and GERD that presents to [facility] with acute onset mild R MCA syndrome. Early this evening, she experienced LUE, LLE weakness, mild L facial droop, mild dysarthria, and decreased sensation on L. She also notes associated HA and neck pain. LKN ~2.5 hrs PTA to [facility] ED. Initial NIHSS 4. CTH/CTA unremarkable without ICH or LVO. TNK given at 2035. She will be admitted to [facility] for close monitoring and observation post-TNK.    Hospital Course: 1/5/2024: Improving neurological exam overnight, pending MRI this afternoon.  Therapies ordered  1/6/24: ok to discharge home    Ok to discharge per Vascular Neurology  Objective:     Vitals:  Temp: 97.4 °F (36.3 °C)  Pulse: 74  Rhythm: normal sinus rhythm  BP: (!) 149/82  MAP (mmHg): 109  Resp: 20  SpO2: 98 %    Temp  Min: 97.4 °F (36.3 °C)  Max: 98.8 °F (37.1 °C)  Pulse  Min: 55  Max: 83  BP  Min: 100/59  Max: 176/82  MAP (mmHg)  Min: 77  Max: 118  Resp  Min: 14  Max: 28  SpO2  Min: 94 %  Max: 99 %    01/05 0701 - 01/06 0700  In: 480 [P.O.:480]  Out: 100 [Urine:100]   Unmeasured Output  Urine Occurrence: 1  Stool Occurrence: 1  Pad Count: 2        Physical Exam  Vitals and nursing note reviewed.   Constitutional:       Appearance: Normal appearance.   HENT:      Head: Normocephalic.      Nose: Nose normal.      Mouth/Throat:      Mouth: Mucous membranes are moist.      Pharynx: Oropharynx is clear.   Eyes:      Pupils: Pupils are equal, round, and reactive to light.   Cardiovascular:      Rate and Rhythm: Normal rate and regular rhythm.      Pulses: Normal pulses.      Heart sounds: Normal heart sounds.   Pulmonary:      Effort: Pulmonary effort is normal.       Breath sounds: Normal breath sounds.   Abdominal:      General: Bowel sounds are normal.      Palpations: Abdomen is soft.   Musculoskeletal:         General: Normal range of motion.   Skin:     General: Skin is warm and dry.      Capillary Refill: Capillary refill takes 2 to 3 seconds.   Neurological:      Mental Status: She is alert.       Constitutional:       Appearance: resting in bed in no distress, alert and well-nourished  Cardiovascular:      Rate and Rhythm: Normal rate and regular rhythm.  Pulses intact x4  Pulmonary:      Effort: Pulmonary effort is normal. No respiratory distress.   Abdominal:      Palpations: Abdomen is soft, non-tender   Neurological:   [id number]  Follows all commands and responds to questions appropriately, repetition and naming intact. Face symmetric and no dysarthria, EOMi, PERRL  Visual fields intact  5/5 motor score throughout without drift  Sensation intact to light touch  No dysmetria/ataxia to bedside testing    NIHSS 0        Medications:  Continuous ScheduledallopurinoL, 300 mg, Q12H  aspirin, 81 mg, Daily  atorvastatin, 80 mg, Daily  cyanocobalamin, 250 mcg, Daily  famotidine, 20 mg, BID  FLUoxetine, 20 mg, Daily  heparin (porcine), 5,000 Units, Q8H  oxybutynin, 5 mg, Daily  senna-docusate 8.6-50 mg, 1 tablet, Daily    PRNacetaminophen, 650 mg, Q6H PRN  albuterol, 2 puff, Q6H PRN  dextrose 10%, 12.5 g, PRN  dextrose 10%, 25 g, PRN  diclofenac sodium, 2 g, QID PRN  glucagon (human recombinant), 1 mg, PRN  glucose, 16 g, PRN  glucose, 24 g, PRN  hydrALAZINE, 10 mg, Q4H PRN  insulin aspart U-100, 0-10 Units, QID (AC + HS) PRN  labetalol, 10 mg, Q4H PRN  sodium chloride 0.9%, 10 mL, PRN      Today I personally reviewed pertinent medications, lines/drains/airways, imaging, cardiology results, laboratory results, microbiology results,     Diet  Diet diabetic 2000 Calorie  Diet diabetic 2000 Calorie        Review of Systems   Constitutional: Negative.    HENT: Negative.     Eyes:  Negative.    Respiratory: Negative.     Cardiovascular: Negative.    Gastrointestinal: Negative.    Endocrine: Negative.    Genitourinary: Negative.    Musculoskeletal: Negative.    Neurological: Negative.      Assessment/Plan:     Neuro  * Acute right MCA stroke  Antithrombotics for secondary stroke prevention: Antiplatelets: None: Hold all Antithrombotics x 24 hours after IV Thrombolytic therapy administration    Statins for secondary stroke prevention and hyperlipidemia, if present:   Statins: Atorvastatin- 40 mg daily    Aggressive risk factor modification: HTN, DM, HLD, Obesity     Rehab efforts: The patient has been evaluated by a stroke team provider and the therapy needs have been fully considered based off the presenting complaints and exam findings. The following therapy evaluations are needed: PT evaluate and treat, OT evaluate and treat, SLP evaluate and treat, PM&R evaluate for appropriate placement    Diagnostics ordered/pending: CT scan of head without contrast to asses brain parenchyma, CTA Neck/Arch to assess vasculature, HgbA1C to assess blood glucose levels, Lipid Profile to assess cholesterol levels, MRI head without contrast to assess brain parenchyma, TTE to assess cardiac function/status , TSH to assess thyroid function    VTE prophylaxis: Mechanical prophylaxis: Place SCDs  None: Reason for No Pharmacological VTE Prophylaxis: Holding x 24 hours s/p treatment with Thrombolytic therapy    BP parameters: Infarct: Post Thrombolytic therapy, SBP <180        Chronic pain  Prescribed opioids at home  Resume as appropriate    Psychiatric  Recurrent major depressive disorder, in partial remission  Resume home prozac    Pulmonary  Mild intermittent asthma without complication  Home prn nebs resumed    Cardiac/Vascular  Hyperlipemia  Lipid panel completed  Statin ordered    Hematology  Received tissue plasminogen activator (t-PA) less than 24 hours prior to arrival  57 y/o F presenting with R MCA  syndrome s/p tPA 1/4/2024 dose completed @20:30 per report. No LVO noted on CTA.     -Admitted to [facility], stroke following  -q1h neuro checks, vital checks for 24 hours per protocol  -EKG, ECHO, CXR completed  -A1c, TSH, lipid panel, coag panel  -Daily CBC, CMP, mag, phos  -SBP < 180, prn labetalol and hydralazine  -Statin  -SCD, Hold ACAP in post TNK setting x 24 hours  -PT/OT/SLP  -MRI pending ( see Read)  - ok to discharge per vascular neurology    Endocrine  Type 2 diabetes mellitus without complication, without long-term current use of insulin  A1c 6.2%  BG goal 140-200  SSI, Accuchecks   Diabetic diet     Morbid obesity  Stroke risk factor  Nutrition consulted - ADA diet    GI  GERD (gastroesophageal reflux disease)  Prilosec at home  Replace with formulary ppi    Orthopedic  Gout  Last uric acid level WNL  Resume home allopurinol      Other  Impaired functional mobility, balance, gait, and endurance  Per prior outpatient PT note from 3 months ago     - repeat PT/OT assessments and treatment    PADDY (obstructive sleep apnea)  Confirm use of home CPAP and resume as appropriate          The patient is being Prophylaxed for:  Venous Thromboembolism with: Mechanical or Chemical  Stress Ulcer with: Not Applicable   Ventilator Pneumonia with: not applicable    Activity Orders            Diet diabetic 2000 Calorie: Diabetic starting at 01/05 0000    Turn patient starting at 01/04 2200    Elevate HOB starting at 01/04 2032          Full Code  Level 3  Ember Lawler NP  Neurocritical Care  [facility] - Neuro Critical Care

## 2024-01-06 NOTE — PLAN OF CARE
[facility] Care Plan    POC reviewed with Alivia Marie Cyr and family at 1400. Pt verbalized understanding. Questions and concerns addressed. No acute events today. Pt progressing toward goals. Will continue to monitor. See below and flowsheets for full assessment and VS info.     -pt discharging home today        Is this a stroke patient? yes- Stroke booklet reviewed with patient, risk factors identified for patient and stroke booklet remains at bedside for ongoing education.     Neuro:  [place] Coma Scale  Best Eye Response: 4-->(E4) spontaneous  Best Motor Response: 6-->(M6) obeys commands  Best Verbal Response: 5-->(V5) oriented  [place] Coma Scale Score: 15  Assessment Qualifiers: no eye obstruction present, patient not sedated/intubated  Pupil PERRLA: yes     24 hr Temp:  [97.4 °F (36.3 °C)-98.8 °F (37.1 °C)]     CV:   Rhythm: normal sinus rhythm  BP goals:   SBP < 180  MAP > 65    Resp:           Plan: N/A    GI/:     Diet/Nutrition Received: consistent carb/diabetic diet  Last Bowel Movement: 01/04/24  Voiding Characteristics: external catheter, voids spontaneously without difficulty    Intake/Output Summary (Last 24 hours) at 1/6/2024 1448  Last data filed at 1/6/2024 0849  Gross per 24 hour   Intake 490 ml   Output 100 ml   Net 390 ml     Unmeasured Output  Urine Occurrence: 1  Stool Occurrence: 1  Pad Count: 2    Labs/Accuchecks:  Recent Labs   Lab 01/06/24  0316   WBC 4.50   RBC 4.05   HGB 12.6   HCT 36.3*         Recent Labs   Lab 01/06/24  0316      K 3.9   CO2 24      BUN 12   CREATININE 0.7   ALKPHOS 53*   ALT 12   AST 15   BILITOT 0.4      Recent Labs   Lab 01/04/24 2031   INR 1.0      Recent Labs   Lab 01/04/24 2041   TROPONINI <0.006       Electrolytes: N/A - electrolytes WDL  Accuchecks: ACHS    Gtts:      LDA/Wounds:  Lines/Drains/Airways       Peripheral Intravenous Line  Duration                  Peripheral IV - Single Lumen 01/04/24 2037 20 G Left Antecubital 1 day                   Wounds: No  Wound care consulted: No

## 2024-01-06 NOTE — PLAN OF CARE
[facility] Care Plan    POC reviewed with Alivia Marie Cyr and family at 0300. Pt verbalized understanding. Questions and concerns addressed. No acute events overnight. Pt progressing toward goals. Will continue to monitor. See below and flowsheets for full assessment and VS info.     Is this a stroke patient? yes- Stroke booklet reviewed with patient, risk factors identified for patient and stroke booklet remains at bedside for ongoing education.     Neuro:  [place] Coma Scale  Best Eye Response: 4-->(E4) spontaneous  Best Motor Response: 6-->(M6) obeys commands  Best Verbal Response: 5-->(V5) oriented  [place] Coma Scale Score: 15  Assessment Qualifiers: patient not sedated/intubated, no eye obstruction present  Pupil PERRLA: yes     24hr Temp:  [98 °F (36.7 °C)-98.8 °F (37.1 °C)]     CV:   Rhythm: normal sinus rhythm  BP goals:   SBP < 180  MAP > 65    Resp:           Plan: N/A    GI/:     Diet/Nutrition Received: consistent carb/diabetic diet  Last Bowel Movement: 01/04/24  Voiding Characteristics: external catheter    Intake/Output Summary (Last 24 hours) at 1/6/2024 0511  Last data filed at 1/5/2024 1735  Gross per 24 hour   Intake 480 ml   Output 900 ml   Net -420 ml     Unmeasured Output  Urine Occurrence: 1  Pad Count: 2    Labs/Accuchecks:  Recent Labs   Lab 01/06/24  0316   WBC 4.50   RBC 4.05   HGB 12.6   HCT 36.3*         Recent Labs   Lab 01/06/24  0316      K 3.9   CO2 24      BUN 12   CREATININE 0.7   ALKPHOS 53*   ALT 12   AST 15   BILITOT 0.4      Recent Labs   Lab 01/04/24  2031   INR 1.0      Recent Labs   Lab 01/04/24  2041   TROPONINI <0.006       Electrolytes: N/A - electrolytes WDL  Accuchecks: Q6H    Gtts:      LDA/Wounds:  Lines/Drains/Airways       Drain  Duration             Female External Urinary Catheter w/ Suction 01/04/24 2247 1 day              Peripheral Intravenous Line  Duration                  Peripheral IV - Single Lumen 01/04/24 1950 20 G Right Antecubital 1  day         Peripheral IV - Single Lumen 01/04/24 2037 20 G Left Antecubital 1 day                  Wounds: No  Wound care consulted: No      Problem: Adjustment to Illness (Stroke, Ischemic/Transient Ischemic Attack)  Goal: Optimal Coping  Outcome: Ongoing, Progressing     Problem: Bowel Elimination Impaired (Stroke, Ischemic/Transient Ischemic Attack)  Goal: Effective Bowel Elimination  Outcome: Ongoing, Progressing     Problem: Cerebral Tissue Perfusion (Stroke, Ischemic/Transient Ischemic Attack)  Goal: Optimal Cerebral Tissue Perfusion  Outcome: Ongoing, Progressing     Problem: Cognitive Impairment (Stroke, Ischemic/Transient Ischemic Attack)  Goal: Optimal Cognitive Function  Outcome: Ongoing, Progressing     Problem: Communication Impairment (Stroke, Ischemic/Transient Ischemic Attack)  Goal: Improved Communication Skills  Outcome: Ongoing, Progressing     Problem: Functional Ability Impaired (Stroke, Ischemic/Transient Ischemic Attack)  Goal: Optimal Functional Ability  Outcome: Ongoing, Progressing     Problem: Respiratory Compromise (Stroke, Ischemic/Transient Ischemic Attack)  Goal: Effective Oxygenation and Ventilation  Outcome: Ongoing, Progressing     Problem: Sensorimotor Impairment (Stroke, Ischemic/Transient Ischemic Attack)  Goal: Improved Sensorimotor Function  Outcome: Ongoing, Progressing     Problem: Swallowing Impairment (Stroke, Ischemic/Transient Ischemic Attack)  Goal: Optimal Eating and Swallowing without Aspiration  Outcome: Ongoing, Progressing     Problem: Urinary Elimination Impaired (Stroke, Ischemic/Transient Ischemic Attack)  Goal: Effective Urinary Elimination  Outcome: Ongoing, Progressing     Problem: Adult Inpatient Plan of Care  Goal: Plan of Care Review  Outcome: Ongoing, Progressing  Goal: Patient-Specific Goal (Individualized)  Outcome: Ongoing, Progressing  Goal: Absence of Hospital-Acquired Illness or Injury  Outcome: Ongoing, Progressing  Goal: Optimal Comfort and  Wellbeing  Outcome: Ongoing, Progressing  Goal: Readiness for Transition of Care  Outcome: Ongoing, Progressing     Problem: Diabetes Comorbidity  Goal: Blood Glucose Level Within Targeted Range  Outcome: Ongoing, Progressing     Problem: Skin Injury Risk Increased  Goal: Skin Health and Integrity  Outcome: Ongoing, Progressing

## 2024-01-07 ENCOUNTER — NURSE TRIAGE (OUTPATIENT)
Dept: ADMINISTRATIVE | Facility: CLINIC | Age: 59
End: 2024-01-07
Payer: MEDICARE

## 2024-01-07 NOTE — TELEPHONE ENCOUNTER
"PD Day 1 callerAlivia states no symptoms but left arm is weak which she states is no worse now than when dc'd home from hospital yesterday. Pt states she has pain in left shoulder when raising left arm above her head, unsure if this was present in hospital as she states she had did not lift the arm above her head in the hospital. Triage offered and accepted. Pt asking if home health is ordered. Per chart review, Neurology referral. Informed pt to contact scheduling during office hours for appt. Advised pt per triage protocol to see a physician within next 4 hours at nearest ED. V/u.   Reason for Disposition   [1] Shoulder pains with exertion (e.g., walking) AND [2] pain goes away on resting AND [3] not present now    Additional Information   Negative: Sounds like a life-threatening emergency to the triager   [1] New symptom AND [2] not a possible complication of hospitalized condition   Negative: Passed out (i.e., lost consciousness, collapsed and was not responding)   Negative: Shock suspected (e.g., cold/pale/clammy skin, too weak to stand, low BP, rapid pulse)   Negative: [1] Similar pain previously AND [2] it was from "heart attack"   Negative: [1] Similar pain previously AND [2] it was from "angina" AND [3] not relieved by nitroglycerin   Negative: Sounds like a life-threatening emergency to the triager   Negative: Followed a shoulder injury   Negative: Chest pain   Negative: Difficulty breathing or unusual sweating (e.g., sweating without exertion)   Negative: [1] Pain lasting > 5 minutes AND [2] pain also present in chest  (Exception: Pain is clearly made worse by movement.)   Negative: [1] Age > 40 AND [2] no obvious cause AND [3] pain even when not moving the arm  (Exception: Pain is clearly made worse by moving arm or bending neck.)   Negative: [1] SEVERE pain AND [2] not improved 2 hours after pain medicine   Negative: [1] Red area or streak AND [2] fever   Negative: [1] Swollen joint AND [2] fever   " Negative: Patient sounds very sick or weak to the triager   Negative: Entire arm is swollen   Negative: Weakness (i.e., loss of strength) in hand or fingers  (Exception: Not truly weak; hand feels weak because of pain.)    Protocols used: Post-Hospitalization Follow-up Call-A-, Shoulder Pain-A-

## 2024-01-08 ENCOUNTER — TELEPHONE (OUTPATIENT)
Dept: PAIN MEDICINE | Facility: CLINIC | Age: 59
End: 2024-01-08
Payer: MEDICARE

## 2024-01-08 ENCOUNTER — TELEPHONE (OUTPATIENT)
Dept: INTERNAL MEDICINE | Facility: CLINIC | Age: 59
End: 2024-01-08

## 2024-01-08 ENCOUNTER — PATIENT MESSAGE (OUTPATIENT)
Dept: PAIN MEDICINE | Facility: CLINIC | Age: 59
End: 2024-01-08
Payer: MEDICARE

## 2024-01-08 DIAGNOSIS — I63.511 ACUTE RIGHT MCA STROKE: Primary | ICD-10-CM

## 2024-01-08 PROBLEM — M47.819 SPONDYLOSIS WITHOUT MYELOPATHY: Status: ACTIVE | Noted: 2021-04-26

## 2024-01-08 NOTE — TELEPHONE ENCOUNTER
----- Message from Lotus Truong sent at 1/8/2024  7:07 AM CST -----  Regarding: Pt called to cancel/reschedule her provider appt this morning due to having a stroke last week  Appointment Access Request:    Pt called to cancel/reschedule her provider appt this morning due to having a stroke last week    Pt can be reached at  [phone]

## 2024-01-09 ENCOUNTER — PATIENT MESSAGE (OUTPATIENT)
Dept: BARIATRICS | Facility: CLINIC | Age: 59
End: 2024-01-09
Payer: MEDICARE

## 2024-01-09 ENCOUNTER — OFFICE VISIT (OUTPATIENT)
Dept: PAIN MEDICINE | Facility: CLINIC | Age: 59
End: 2024-01-09
Payer: MEDICARE

## 2024-01-09 DIAGNOSIS — M17.0 PRIMARY OSTEOARTHRITIS OF BOTH KNEES: ICD-10-CM

## 2024-01-09 DIAGNOSIS — M51.36 DDD (DEGENERATIVE DISC DISEASE), LUMBAR: ICD-10-CM

## 2024-01-09 DIAGNOSIS — M47.816 SPONDYLOSIS OF LUMBAR REGION WITHOUT MYELOPATHY OR RADICULOPATHY: ICD-10-CM

## 2024-01-09 DIAGNOSIS — G89.4 CHRONIC PAIN SYNDROME: Primary | ICD-10-CM

## 2024-01-09 DIAGNOSIS — G89.29 CHRONIC PAIN OF BOTH KNEES: ICD-10-CM

## 2024-01-09 DIAGNOSIS — M25.561 CHRONIC PAIN OF BOTH KNEES: ICD-10-CM

## 2024-01-09 DIAGNOSIS — M25.562 CHRONIC PAIN OF BOTH KNEES: ICD-10-CM

## 2024-01-09 PROCEDURE — 99213 OFFICE O/P EST LOW 20 MIN: CPT | Mod: 95,,, | Performed by: NURSE PRACTITIONER

## 2024-01-09 RX ORDER — OXYCODONE AND ACETAMINOPHEN 10; 325 MG/1; MG/1
1 TABLET ORAL EVERY 8 HOURS PRN
Qty: 90 TABLET | Refills: 0 | Status: SHIPPED | OUTPATIENT
Start: 2024-01-09 | End: 2024-02-12 | Stop reason: SDUPTHER

## 2024-01-09 NOTE — PROGRESS NOTES
Chronic patient Established Note (Follow up visit)  Chronic Pain-Tele-Medicine-Established Note (Follow up visit)        The patient location is: Home  The chief complaint leading to consultation is: pain  Visit type: Virtual visit with synchronous audio and video  Total time spent with patient: 25 min  Each patient to whom he or she provides medical services by telemedicine is:  (1) informed of the relationship between the physician and patient and the respective role of any other health care provider with respect to management of the patient; and (2) notified that he or she may decline to receive medical services by telemedicine and may withdraw from such care at any time.         SUBJECTIVE:      Interval History 1/9/2024:  The patient returns to clinic today for follow up of back pain via virtual visit. She is s/p right L3,4,5 RFA on 11/27/2023. She reports 40-50% relief at this time. She did not have left sided RFA due to illness. Of note, she had a stroke last week. She began having a headache, left sided facial drooping, and slurring of her speech. She did go to the ER where she received TPA. She reports mild weakness into her left arm. She continues to report low back pain. She denies any radicular leg pain. Her pain is worse with activity. She also reports bilateral knee pain, worse with standing and walking. She is scheduled for genicular RFA in the next few weeks. She will reschedule left lumbar RFA. She is taking Percocet as needed with benefit. She denies any adverse effects. She denies any other health changes.      Interval History 10/24/23:  Alivia Thomas returns today for follow-up. Since last seen, they report their pain has been worsening. She last received a toradol shot instead of an epidural as she did not wish to have an epidural.  Today, she is here for follow-up with me to evaluate.  She is requesting to increase her oxycodone to 4 times a day instead of 3 times a day.  I had a long  discussion with her and advised her we either change the medication but not increase it, try repeating radiofrequency ablation since it worked well for her and/or spinal cord stimulator.  Yesterday, surgery was another alternative for managing her pain.  She had a CT scan that we went over the results.  She has multilevel degenerative disease with severe facet degenerative disease and multilevel spinal and foraminal stenosis.  Yesterday, she declined the surgery.  Today she is open to repeating the radiofrequency ablation but she does not want to change the medication.  Previously, the radiofrequency ablation lasted several months up to a year.  The last time she said it lasted about 3-4 month than the pain started coming back gradually over the following 3-4 months.  Overall, she still had good relief.  She is complaining of the right knee as well.  Much less pain on the left knee.  The radicular component of her back pain is in the dorsal thighs bilaterally worse on the right negligible on the left.  No new bowel or bladder symptomatology.  No fever, chills or night sweats.    Interval History 10/23/23: Alivia Thomas returns for follow up. This is a patient of Dr. Hurtado. Her visit with [place] was cancelled last week so she was placed on my schedule today. At her last visit she was having pain radiating down her legs, but did not want to have an epidural. They performed a Toradol shot (short term relief) and referred her to Neurosurgery. She saw Dr. Bartlett today who said either she will need SCS or decompression. On review of her CT and MRI, she has severe facet arthropathy and severe canal stenosis at L3/4 and L4/5. She notes being able to walk <1 block, after which she must sit down. She reports 8-10/10 pain and this is interfering with her life significantly.     Interval History 9/18/2023:  The patient presents today to request an injection for increased back pain. She has been having worsened pain over the  past couple of weeks. She does not wish to have another KERI at this time. She is having lower back pain with radiation down the back of both legs, R>L. She has associated numbness and tingling as well as subjective weakness. No bowel or bladder incontinence. She has not seen neurosurgery. Her pain today is 10/10.    Interval History 8/14/2023:  The patient is here for follow up after procedure for back pain. She is now s/p L5/S1 IL KERI with about 50% relief. She still has lower back pain with radiation down the back of the legs, stopping at the knees. She has associated numbness to lower extremities. Her pain worsens with walking, bending and reaching upward. She has been working on home exercises and stretches without much benefit. She has some benefit with medication as needed.  She report Her pain today is 9/10.    Interval History 7/10/2023:  The patient is here today for evaluation of increased lower back pain. She has a long-standing history of back pain which is mainly axial. She has had worsened symptoms over the past couple of weeks, most severe over the past 3 days. The pain now radiates down the back of both legs with burning and numbness. She finds it difficult to stand or walk for any length of time. No bowel or bladder incontinence. No fever or malaise. Medications are helping somewhat. She continues with home PT exercises as previously taught in PT. Her pain today is 10/10.    Interval History 4/10/2023:  The patient returns to clinic today for follow up of low back and knee pain via virtual visit. She is s/p right L3,4,5 RFA on 3/6/2023. Her left side procedure was rescheduled due to illness. This is scheduled for May 1st. She reports some relief of her right sided low back pain. She continues to report left sided low back pain. She denies any radicular leg pain. She does endorse morning stiffness. She continues to perform a home exercise routine. She is taking Percocet as needed for severe pain. She  denies any other health changes.     Interval History 1/30/2023:  The patient returns to clinic today for follow up of low back and knee pain. She is s/p right genicular RFA on 12/12/2022 and left genicular RFA on 1/9/2023. She reports 60% relief of her knee pain. She reports intermittent bilateral knee pain, this is tolerable at this time. She reports increased low back pain. Her pain is constant and aching in nature. Her leg pain is much improved. Her pain is worse with moving from sitting to standing. She does endorse morning stiffness. She continues to participate in physical therapy and a home exercise routine. She continues to take Percocet as needed with benefit and without adverse effects. She denies any other health changes.     Interval History 11/22/2022:  The patient returns to clinic today for follow up of low back and knee pain. She continues to report low back pain that radiates into the posterior aspect of both legs to under her feet. Her pain is worse with moving from sitting to standing. She also endorses morning stiffness. She recently started physical therapy. She has completed one session. She continues to report bilateral knee pain. She endorses worsening pain with the cold weather. Her pain is worse with standing and walking. She continues to take Percocet as needed with benefit and without adverse effects. She denies any other health changes. Her pain today is 8/10.    Interval History 10/5/2022:  She returns for follow-up.  Her knees are doing well.  She has some discomfort but not enough to do procedures.  Her main complaint is lumbar spine pain for the past few weeks that is radiating to the dorsal aspect of both lower extremities.  She has no red flag signs/symptoms.  No new bowel or bladder symptomatology.  The pain radiates from the back down to the plantar aspect of both feet.  It is activity related.  Rest helps some but it does not resolve the pain.  She has not been in therapy for a  while.  No recent imaging.  No recent weight changes.  Otherwise, no new symptomatology.  She goes regularly to her primary care physician for health maintenance.    Interval History 8/9/2022:  Alivia Thomas presents to the clinic for a follow-up appointment for low back and knee pain. She is s/p right genicular RFA on 5/9/2022 and left genicular RFA on 5/22/2022. She reports 60% relief of her knee pain. She also had panniculectomy on 6/14/2022. She is healing well. She continues to report intermittent low back pain, worse with prolonged activity. She denies any radicular leg pain. Her pain is worse prolonged standing and walking. She continues to perform a homoe exercise routine. She continues to take Percocet as needed with benefit and without adverse effects. She denies any other health changes. Her pain today is 7/10.    Interval History 4/9/2022:  The patient returns to clinic today for follow-up bilateral L3, 4, 5 RFA last month.  She reports that she is feeling very well following the procedure and reports 60-70% pain relief.  Today, she reports that her bilateral knee pain has continued to worsen, and she would like to go ahead and repeat bilateral genicular RFA as soon as possible.  She denies any new numbness, tingling, weakness, saddle anesthesia or bowel/bladder incontinence.    Interval History 12/28/2021:  The patient returns to clinic today for follow up via virtual visit. She continues to report bilateral knee pain. This pain is worse with prolonged standing and walking. She continues to report low back and buttock pain. She denies any radicular leg pain. She continues to report a home exercise routine. She continues to report benefit with Percocet. She denies any adverse effects. She denies any other health changes.    Pain Disability Index Review:      10/23/2023     3:37 PM 9/18/2023     1:25 PM 8/14/2023     8:43 AM   Last 3 PDI Scores   Pain Disability Index (PDI) 27 24 43       Pain  Medications:  Percocet 10/325 mg TID PRN pain    Opioid Contract: yes     report:  Reviewed and consistent with medication use as prescribed.    Pain Procedures:   steroid inj and visco-supplementation (series of 3) in left knee- not helpful  3/31/14 Bilateral genicular nerve blocks  2/16/16 Bilateral L2,3,4,5 MBB  3/1/16 Bilateral L2,3,4,5 MBB   4/6/16 Left L2,3,4,5 RFA- significant relief  4/20/16 Right L2,3,4,5 RFA- significant relief  10/5/16 Left L2,3,4,5 cooled RFA  10/19/16 Right L2,3,4,5 cooled RFA  4/26/17 Left L2,3,4,5 cooled RFA  5/9/17 Right L2,3,4,5 cooled RFA  12/26/2017- Left L2,3,4,5 cooled RFA  1/9/2018- Right L2,3,4,5 cooled RFA  6/26/18 Left L2,3,4,5 cooled RFA- 60% relief  7/10/18 Right L2,3,4,5 cooled RFA- 60% relief  9/28/18 TPIs- significant relief  12/27/18 Left L2,3,4,5 RFA- 70% relief  1/29/19 Right L2,3,4,5 RFA- 70% relief  6/18/19 Left L2,3,4,5 RFA- 70% relief  7/9/19 Right L2,3,4,5 RFA- 50% relief  12/19/19 Left L2,3,4,5 RFA- 80% relief  12/31/19 Right L2,3,4,5 RFA- 50% relief  3/2/2020- Right genicular nerve block- 70% relief for one month  3/12/20 Left subacromial bursa injection- 90% relief  5/18/2020- Left genicular nerve block- 70%  6/9/2020- Bilateral SI joint injections- 50% relief  10/13/2020- Right genicular RFA- 50% relief   11/3/2020- Left genicular RFA- 50% relief  12/15/2020- Left L2,3,4,5 RFA  12/29/2020- Right L2,3,4,5 RFA  4/26/2021- Left subacromial bursa'  5/11/2021- Bilateral SI joint injections   6/28/2021- Left genicular RFA  7/13/2021- Right genicular RFA  8/24/2021- Right L2,3,4,5 RFA  9/11/2021- Left L2,3,4,5 RFA  3/7/2022- Right L2,3,4,5 RFA  3/21/2022- Left L2,3,4,5 RFA  5/9/2022- Right genicular RFA  5/23/2022- Left genicular RFA  12/12/2022- Right genicular RFA  1/9/2023- Left genicular RFA  3/6/2023- Right L3,4,5 RFA  7/24/23 L5/S1 IL KERI- 50% relief  11/27/2023- Right L3,4,5 RFA    Physical Therapy/Home Exercise: yes    Imaging:   MRI Lumbar Spine  7/12/2023:  COMPARISON:  10/6/22.     FINDINGS:  Alignment: Grade 1 anterolisthesis at L3-L4 and L4-L5.  No evidence for spondylolysis.     Vertebrae: Degenerative endplate changes throughout the lower lumbar spine.  Hemangioma noted within the L5 vertebral body.  No fracture or marrow infiltrative process.     Discs: Moderate disc height loss at L3-S1.  No evidence for discitis.     Cord: Conus terminates at T12-L1 and appears unremarkable.  Cauda equina appears unremarkable.     Degenerative findings:     T12-L1: No spinal canal stenosis or neuroforaminal narrowing.     L1-L2: Mild facet arthropathy results in mild right neural foraminal narrowing.     L2-L3: Circumferential disc bulge and mild facet arthropathy result in mild effacement of the thecal sac and mild bilateral neural foraminal narrowing.     L3-L4: Uncovering of the intervertebral disc with bulge and severe facet arthropathy result in severe spinal canal stenosis and severe left, moderate right neural foraminal narrowing.     L4-L5: Uncovering of the intervertebral disc with bulge and severe facet arthropathy result in severe spinal canal stenosis and severe left, moderate right neural foraminal narrowing.     L5-S1: Circumferential disc bulge and mild facet arthropathy.  No spinal canal stenosis or neural foraminal narrowing.     Paraspinal muscles & soft tissues: Mild paraspinal muscle atrophy.  Partially visualized markedly enlarged leiomyomatous uterus noted.  There is asymmetric enlargement of the right kidney.     Impression:     1. Multilevel advanced degenerative change of the lumbar spine.  Severe spinal canal stenosis and moderate to severe neural foraminal narrowing noted at L3-L5.  2. Partially visualized markedly enlarged leiomyomatous uterus.  Recommend further evaluation pelvic ultrasound.  3. Asymmetric enlargement of the right kidney.  Recommend further evaluation with retroperitoneal ultrasound.  This report was flagged in Epic as  abnormal.    Xray Knee 3/6/2019:  FINDINGS:  Postop bilateral knee replacements.  The the the the irregularity about the left patella with soft tissue calcifications similar.  Small joint effusion.  Some irregularity of the right patella unchanged as well.     IMPRESSION:      Postop bilateral knee replacement with irregularity about the patella as above.    MRI Cervical Spine 4/24/2018:  FINDINGS:  There is reversal of the normal cervical lordosis which could be related to patient positioning versus muscle spasm.     Cervical vertebral body heights are well maintained without evidence of acute fracture or dislocation.  Marrow signal is unremarkable.     Spinal canal contents are unremarkable.  No abnormal masses or collections.     Individual levels as detailed below:     C2-3: No significant spinal canal stenosis or neuroforaminal narrowing.     C3-4: Facet arthropathy.  No significant spinal canal stenosis or neuroforaminal narrowing.     C4-5: Facet arthropathy.  Small broad-based disc osteophyte complex.  Mild right neuroforaminal narrowing.  Mild spinal canal stenosis.     C5-6: Facet arthropathy.  Uncovertebral joint spurring.  Broad-based posterior disc osteophyte complex.  Mild right neuroforaminal narrowing.  Mild spinal canal stenosis.     C6-7: Facet arthropathy.  No significant spinal canal stenosis or neuroforaminal narrowing.     C7-T1: No significant spinal canal stenosis or neuroforaminal narrowing.     Paraspinal muscles are unremarkable.  Soft tissues are intact.     IMPRESSION:      Mild multilevel cervical spondylosis with mild spinal canal stenosis and mild right neuroforaminal narrowing at C4-5 and C5-6.    Xray Lumbar Spine 9/21/2015:  Results: AP, lateral neutral, lateral flexion , lateral extension, bilateral oblique and spot views.  The alignment of the lumbar spine demonstrates a mild levoscoliosis .  11-mm anterior listhesis of L4 relative to L5 with no translational abnormalities   seen  on flexion and extension views.  The vertebral body heights  are well-maintained  , mild disk space narrowing L4-L5 and L5-S1.   Mild anterior and marginal osteophyte formation seen  throughout the lumbar spine  .  The oblique views demonstrate no   definite spondylolysis.  There is facet joint osseous hypertrophy noted at L3-L4 and L4-L5.  IMPRESSION:         The Significant spondylosis of the lumbar spine with grade 1 anterior listhesis L4 relative to L5.  Facet joint osseous hypertrophy L3-L4 and L4-5.    Allergies:   Review of patient's allergies indicates:   Allergen Reactions    Strawberry Anaphylaxis       Current Medications:   Current Outpatient Medications   Medication Sig Dispense Refill    albuterol (VENTOLIN HFA) 90 mcg/actuation inhaler INHALE TWO PUFFS INTO LUNGS EVERY 4 TO 6 HOURS AS NEEDED FOR SHORTNESS OF BREATH AND FOR WHEEZING 18 g 1    allopurinoL (ZYLOPRIM) 300 MG tablet Take 1 tablet (300 mg total) by mouth 2 (two) times daily. 180 tablet 3    aspirin 81 MG Chew Chew and swallow 1 tablet (81 mg total) by mouth once daily. 30 tablet 11    atorvastatin (LIPITOR) 80 MG tablet Take 1 tablet (80 mg total) by mouth once daily. 90 tablet 3    b complex vitamins tablet Take 1 tablet by mouth every other day.       calcium citrate/vitamin D2 (ISIAH-CITRATE ORAL) Take 500 mg by mouth 2 (two) times daily.      colchicine, gout, (MITIGARE) 0.6 mg Cap TAKE 1 CAPSULE (0.6 MG TOTAL) BY MOUTH DAILY AS NEEDED (FLARE UP). 90 capsule 1    cyanocobalamin (VITAMIN B-12) 1000 MCG tablet Take 100 mcg by mouth every morning.      diclofenac sodium (VOLTAREN) 1 % Gel Apply 2 g topically 4 (four) times daily as needed. To painful area on the feet. 500 g 5    FLUoxetine (PROZAC) 20 mg/5 mL (4 mg/mL) solution TAKE 5 MLS BY MOUTH ONCE DAILY. 450 mL 3    fluticasone propionate (FLONASE) 50 mcg/actuation nasal spray 1 SPRAY BY EACH NOSTRIL ROUTE 2 TIMES DAILY AS NEEDED FOR RHINITIS. 48 g 3    LIDOcaine (XYLOCAINE) 5 % Oint  ointment APPLY TOPICALLY AS NEEDED. 35.44 g 6    MOUNJARO 2.5 mg/0.5 mL PnIj INJECT 2.5 MG INTO THE SKIN EVERY 7 DAYS. 12 Pen 1    MULTIVIT-IRON-MIN-FOLIC ACID 3,500-18-0.4 UNIT-MG-MG ORAL CHEW Take 1 tablet by mouth 2 (two) times daily.       nystatin (MYCOSTATIN) powder Apply topically 2 (two) times daily. 60 g 3    omeprazole (PRILOSEC) 20 MG capsule TAKE 1 CAPSULE (20 MG TOTAL) BY MOUTH EVERY MORNING. 90 capsule 2    oxybutynin (DITROPAN-XL) 5 MG TR24 TAKE 1 TABLET BY MOUTH ONCE DAILY. 90 tablet 3    oxyCODONE-acetaminophen (PERCOCET)  mg per tablet Take 1 tablet by mouth every 4 (four) hours as needed for Pain. 28 tablet 0    tiZANidine (ZANAFLEX) 4 MG tablet TAKE 1 TABLET (4 MG TOTAL) BY MOUTH EVERY 8 (EIGHT) HOURS AS NEEDED (MUSCLE PAIN). 90 tablet 2    tretinoin microspheres (RETIN-A MICRO PUMP) 0.08 % GlwP Apply to affected area daily 50 g 0    urea (CARMOL) 40 % Crea Apply topically once daily. To dry skin on the feet. 120 g 5    vitamin D 185 MG Tab Take 5,000 mg by mouth every morning.        No current facility-administered medications for this visit.     Facility-Administered Medications Ordered in Other Visits   Medication Dose Route Frequency Provider Last Rate Last Admin    0.9%  NaCl infusion   Intravenous Continuous Lina Wagner MD 25 mL/hr at 12/15/20 1506 25 mL/hr at 12/15/20 1506    0.9%  NaCl infusion   Intravenous Continuous Ciro Chung MD           REVIEW OF SYSTEMS:    GENERAL:  No weight loss, malaise or fevers.  HEENT:  Negative for frequent or significant headaches.  NECK:  Negative for lumps, goiter, pain and significant neck swelling.  RESPIRATORY:  Negative for cough, wheezing or shortness of breath.  CARDIOVASCULAR:  Negative for chest pain, leg swelling or palpitations. HTN  GI:  Negative for abdominal discomfort, blood in stools or black stools or change in bowel habits. GERD  MUSCULOSKELETAL:  See HPI.  SKIN:  Negative for lesions, rash, and itching.  PSYCH:   Negative for sleep disturbance, mood disorder and recent psychosocial stressors.  HEMATOLOGY/LYMPHOLOGY:  Negative for prolonged bleeding, bruising easily or swollen nodes.  NEURO:   No history of headaches, syncope, paralysis, seizures or tremors.  ENDO: Diabetes  All other reviewed and negative other than HPI.    Past Medical History:  Past Medical History:   Diagnosis Date    Acute right MCA stroke 1/4/2024    Allergy     Anemia     Asthma     Bilateral shoulder bursitis     Cervical stenosis of spine     Chronic pain     DDD (degenerative disc disease), cervical     Depression 01/28/2019    Diabetes mellitus type II     DJD (degenerative joint disease) of knee 06/19/2014    Facet arthritis of lumbar region 12/17/2015    Facet syndrome 12/17/2015    GERD (gastroesophageal reflux disease)     Heartburn     Hyperlipidemia     Hypertension     Morbid obesity     Neuromuscular disorder     Nuclear sclerotic cataract of left eye 8/8/2023    PADDY (obstructive sleep apnea)     Proteinuria     Right carpal tunnel syndrome     Sacroiliitis 06/13/2018    Sacroiliitis     Sleep apnea     Uterine fibroid        Past Surgical History:  Past Surgical History:   Procedure Laterality Date    CARPAL TUNNEL RELEASE      CARPAL TUNNEL RELEASE  1980s    left    CARPAL TUNNEL RELEASE  2012    right    CATARACT EXTRACTION W/  INTRAOCULAR LENS IMPLANT Left 8/8/2023    Procedure: EXTRACTION, CATARACT, WITH IOL INSERTION;  Surgeon: Justin Block MD;  Location: [facility] OR;  Service: Ophthalmology;  Laterality: Left;    CATARACT EXTRACTION W/  INTRAOCULAR LENS IMPLANT Right 8/29/2023    Procedure: EXTRACTION, CATARACT, WITH IOL INSERTION;  Surgeon: Justin Block MD;  Location: [facility] OR;  Service: Ophthalmology;  Laterality: Right;    COLONOSCOPY N/A 6/15/2020    Procedure: COLONOSCOPY;  Surgeon: Jc Koo MD;  Location: [address]);  Service: Endoscopy;  Laterality: N/A;  COVID screening on 6/13/20 at [facility]  -rb  pt updated on drop off location and no visitor policy-    EPIDURAL STEROID INJECTION N/A 7/24/2023    Procedure: INJECTION, STEROID, EPIDURAL, L5/S1 SOONER DATE;  Surgeon: Israel Hurtado MD;  Location: [facility]IN [facility]GT;  Service: Pain Management;  Laterality: N/A;    ESOPHAGOGASTRODUODENOSCOPY N/A 3/27/2019    Procedure: EGD (ESOPHAGOGASTRODUODENOSCOPY);  Surgeon: Robbin Bar MD;  Location: [facility] (2ND FLR);  Service: Endoscopy;  Laterality: N/A;  BMI 61.3/2nd floor case/svn    INJECTION OF JOINT Bilateral 6/9/2020    Procedure: INJECTION, JOINT, BILATERAL SI;  Surgeon: Lina Wagner MD;  Location: [facility];  Service: Pain Management;  Laterality: Bilateral;  B/L SI Joint Injection    INJECTION OF JOINT Bilateral 5/11/2021    Procedure: INJECTION, JOINT, SACROILIAC (SI) need consent;  Surgeon: Lina Wagner MD;  Location: [facility];  Service: Pain Management;  Laterality: Bilateral;    JOINT REPLACEMENT Bilateral     with 2 revisions on rt    KNEE SURGERY  3/2010    orthroscope    KNEE SURGERY  6-19-14    left TKR    LAPAROSCOPIC SLEEVE GASTRECTOMY N/A 8/28/2019    Procedure: GASTRECTOMY, SLEEVE, LAPAROSCOPIC with intraop EGD;  Surgeon: Heriberto Clements MD;  [facility];  Service: General;  Laterality: N/A;    PANNICULECTOMY N/A 6/14/2022    Procedure: PANNICULECTOMY;  Surgeon: Rhett Gray MD;  Location: [facility] OR [address] [place] [address];  Service: Plastics;  Laterality: N/A;    RADIOFREQUENCY ABLATION Right 7/9/2019    Procedure: RADIOFREQUENCY ABLATION;  Surgeon: Lina Wagner MD;  Location: [facility];  Service: Pain Management;  Laterality: Right;  RIGHT RFA L2,3,4,5  2 of 2    RADIOFREQUENCY ABLATION Left 12/19/2019    Procedure: RADIOFREQUENCY ABLATION, LEFT L2-L3-L4-L5 MEDIAL BRANCH 1 OF 2;  Surgeon: Lina Wagner MD;  Location: [facility];  Service: Pain Management;  Laterality: Left;    RADIOFREQUENCY ABLATION Right 12/31/2019    Procedure:  RADIOFREQUENCY ABLATION, RIGHT L2-L3-L4-L5  2 OF 2;  Surgeon: Lina Wagner MD;  Location: [facility] PAIN [facility]GT;  Service: Pain Management;  Laterality: Right;    RADIOFREQUENCY ABLATION Right 10/13/2020    Procedure: RADIOFREQUENCY ABLATION, Genicular Cooled 1 of 2;  Surgeon: Lina Wagner MD;  Location: [facility]IN [facility];  Service: Pain Management;  Laterality: Right;    RADIOFREQUENCY ABLATION Left 11/3/2020    Procedure: RADIOFREQUENCY ABLATION, GENICULAR COOLED  2 OF 2;  Surgeon: Lina Wagner MD;  Location: [facility];  Service: Pain Management;  Laterality: Left;    RADIOFREQUENCY ABLATION Left 12/15/2020    Procedure: RADIOFREQUENCY ABLATION LEFT L2,3,4,5 1 of 2;  Surgeon: Lina Wagner MD;  Location: [facility];  Service: Pain Management;  Laterality: Left;    RADIOFREQUENCY ABLATION Right 12/29/2020    Procedure: RADIOFREQUENCY ABLATION RIGHT L2,3,4,5 2 of 2;  Surgeon: Lina Wagner MD;  Location: [facility];  Service: Pain Management;  Laterality: Right;    RADIOFREQUENCY ABLATION Left 6/29/2021    Procedure: RADIOFREQUENCY ABLATION GENICULAR COOLED;  Surgeon: Lina Wagner MD;  Location: [facility];  Service: Pain Management;  Laterality: Left;  1 of 2    RADIOFREQUENCY ABLATION Right 7/13/2021    Procedure: RADIOFREQUENCY ABLATION GENICULAR;  Surgeon: Lina Wagner MD;  Location: [facility];  Service: Pain Management;  Laterality: Right;  2 of 2    RADIOFREQUENCY ABLATION Right 8/24/2021    Procedure: RADIOFREQUENCY ABLATION, L2-L3-L4-L5 MEDIAL BRANCH 1 OF 2;  Surgeon: Lina Wagner MD;  Location: [facility];  Service: Pain Management;  Laterality: Right;    RADIOFREQUENCY ABLATION Left 9/11/2021    Procedure: RADIOFREQUENCY ABLATION, L2-L3-L4-L5 MEDIAL BR ANCH 2 OF 2;  Surgeon: Lina Wagner MD;  Location: [facility];  Service: Pain Management;  Laterality: Left;    RADIOFREQUENCY ABLATION Right 3/7/2022    Procedure: RADIOFREQUENCY ABLATION RIGHT  L3,4,5 1 of 2, needs consent;  Surgeon: Israel Hurtado MD;  Location: [facility];  Service: Pain Management;  Laterality: Right;    RADIOFREQUENCY ABLATION Left 3/21/2022    Procedure: RADIOFREQUENCY ABLATION RIGHT L3,4,5 2 of 2, needs consent;  Surgeon: Israel Hurtado MD;  Location: [facility];  Service: Pain Management;  Laterality: Left;    RADIOFREQUENCY ABLATION Right 5/9/2022    Procedure: RADIOFREQUENCY ABLATION RIGHT GENICULAR COOLED 1 of 2;  Surgeon: Israel Hurtado MD;  Location: [facility];  Service: Pain Management;  Laterality: Right;    RADIOFREQUENCY ABLATION Left 5/23/2022    Procedure: RADIOFREQUENCY ABLATION LEFT GENICULAR COOLED  2 of 2;  Surgeon: Israel Hurtado MD;  Location: [facility];  Service: Pain Management;  Laterality: Left;    RADIOFREQUENCY ABLATION Right 12/12/2022    Procedure: RADIOFREQUENCY ABLATION RIGHT GENICULAR COOLED  ONE OF TWO  NEEDS CONSENT;  Surgeon: Israel Hurtado MD;  Location: [facility];  Service: Pain Management;  Laterality: Right;    RADIOFREQUENCY ABLATION Left 1/9/2023    Procedure: RADIOFREQUENCY ABLATION LEFT GENICULAR COOLED  TWO OF TWO NEEDS CONSENT;  Surgeon: Israel Hurtado MD;  Location: [facility];  Service: Pain Management;  Laterality: Left;    RADIOFREQUENCY ABLATION Right 3/6/2023    Procedure: RADIOFREQUENCY ABLATION RIGHT L3,L4,L5;  Surgeon: Israel Hurtado MD;  Location: [facility];  Service: Pain Management;  Laterality: Right;    RADIOFREQUENCY ABLATION Left 5/22/2023    Procedure: RADIOFREQUENCY ABLATION LEFT L3,L4,L5;  Surgeon: Israel Hurtado MD;  Location[facility];  Service: Pain Management;  Laterality: Left;  4/3 LM TO R/S    RADIOFREQUENCY ABLATION Right 11/27/2023    Procedure: RADIOFREQUENCY ABLATION RIGHT L3, 4, 5 1 OF 3;  Surgeon: Israel Hurtado MD;  Location: [facility];  Service: Pain Management;  Laterality: Right;    RADIOFREQUENCY ABLATION OF LUMBAR MEDIAL BRANCH NERVE AT SINGLE LEVEL Left 6/26/2018    Procedure:  RADIOFREQUENCY ABLATION, NERVE, MEDIAL BRANCH, LUMBAR, 1 LEVEL;  Surgeon: Lina Wagner MD;  Location: [facility];  Service: Pain Management;  Laterality: Left;  Left Cooled RFA @ L2,3,4,5  [id number]  with Sedation    1 of 2    RADIOFREQUENCY ABLATION OF LUMBAR MEDIAL BRANCH NERVE AT SINGLE LEVEL Right 7/10/2018    Procedure: RADIOFREQUENCY ABLATION, NERVE, MEDIAL BRANCH, LUMBAR, 1 LEVEL;  Surgeon: Lina Wagner MD;  Location: [facility];  Service: Pain Management;  Laterality: Right;  RIght Cooled RFA @ L2,3,4,5  [id number] with Sedation    2 of 2    TRIGGER FINGER RELEASE Right 2017    TRIGGER FINGER RELEASE Left 7/29/2019    Procedure: RELEASE, TRIGGER FINGER, Left Thumb;  Surgeon: Velma Garcia MD;  Location: [facility];  Service: Orthopedics;  Laterality: Left;  Local w/ MAC       Family History:  Family History   Problem Relation Age of Onset    Diabetes Mother     Cataracts Mother     Diabetes Father     Cataracts Father     Hypertension Sister     Diabetes Sister     No Known Problems Sister     Cancer Sister         lymphoma     Coronary artery disease Brother     Diabetes Brother     Diabetes Brother     Hypotension Brother     Kidney failure Brother     Hypotension Brother     Amblyopia Neg Hx     Blindness Neg Hx     Glaucoma Neg Hx     Macular degeneration Neg Hx     Retinal detachment Neg Hx     Strabismus Neg Hx     Stroke Neg Hx     Thyroid disease Neg Hx     Anesthesia problems Neg Hx        Social History:  Social History     Socioeconomic History    Marital status:    Tobacco Use    Smoking status: Never    Smokeless tobacco: Never   Substance and Sexual Activity    Alcohol use: Not Currently     Comment: occasionally     Drug use: No    Sexual activity: Yes     Partners: Female     Birth control/protection: None   Social History Narrative    Disabled. The patient is the youngest of 6 siblings. Single. Lives with single-sex partner.                  Social  Determinants of Health     Financial Resource Strain: Patient Declined (6/12/2023)    Overall Financial Resource Strain (CARDIA)     Difficulty of Paying Living Expenses: Patient declined   Food Insecurity: No Food Insecurity (6/12/2023)    Hunger Vital Sign     Worried About Running Out of Food in the Last Year: Never true     Ran Out of Food in the Last Year: Never true   Transportation Needs: No Transportation Needs (6/12/2023)    PRAPARE - Transportation     Lack of Transportation (Medical): No     Lack of Transportation (Non-Medical): No   Physical Activity: Patient Declined (6/12/2023)    Exercise Vital Sign     Days of Exercise per Week: Patient declined     Minutes of Exercise per Session: Patient declined   Stress: Stress Concern Present (6/12/2023)    [employer] [place] - Occupational Stress Questionnaire     Feeling of Stress : Very much   Social Connections: Unknown (6/12/2023)    Social Connection and Isolation Panel [NHANES]     Frequency of Communication with Friends and Family: Twice a week     Frequency of Social Gatherings with Friends and Family: Never     Attends [employer] Services: More than 4 times per year     Active Member of Clubs or Organizations: No     Attends Club or Organization Meetings: Never     Marital Status: Patient declined   Housing Stability: Unknown (6/12/2023)    Housing Stability Vital Sign     Unable to Pay for Housing in the Last Year: Patient refused     Unstable Housing in the Last Year: Patient refused       OBJECTIVE:    Limited exam due to virtual visit:  GENERAL: Well appearing, in no acute distress, alert and oriented x3.   PSYCH:  Mood and affect appropriate.     Previous physical exam:  [facility] 06/26/2018     PHYSICAL EXAMINATION:    GENERAL: Well appearing, in no acute distress, alert and oriented x3.   PSYCH:  Mood and affect appropriate. Tearful and guarding on exam.   SKIN: Skin color, texture, turgor normal, no rashes or lesions.    HEAD/FACE:  Normocephalic, atraumatic.   CV: RRR with palpation of the radial artery.  PULM: No evidence of respiratory difficulty, symmetric chest rise.  BACK: Negative SLR bilaterally. SLR is positive B to the dorsal thigh down to the knee the right worse than the left. There is pain with palpation over midline lumbar spine. Limited ROM with pain on flexion and extension.  Positive facet loading bilaterally. Guarding over the right GTB.   EXTREMITIES: No edema  MUSCULOSKELETAL: 4/5 strength in right ankle with plantar and dorsiflexion. 4/5 strength in left ankle with plantar and dorsiflexion. 5/5 strength with right knee flexion and extension. 5/5 strength with left knee flexion and extension. 5/5 strength in right EHL, 5/5 strength in left EHL.  NEURO:  Plantar response are downgoing. No clonus. Normal sensation.   GAIT: Antalgic- ambulates without assistance.      ASSESSMENT: 58 y.o. year old female with low back and knee pain, consistent with the followin. Chronic pain syndrome        2. Spondylosis of lumbar region without myelopathy or radiculopathy        3. DDD (degenerative disc disease), lumbar        4. Chronic pain of both knees        5. Primary osteoarthritis of both knees              PLAN:     - Previous imaging reviewed. Hospital notes reviewed.     - She is scheduled for genicular RFA.     - She will reschedule left lumbar RFA.     - Continue Percocet 10/325 mg TID PRN, refill provided.     - The patient is here today for a refill of current pain medications and they believe these provide effective pain control and improvements in quality of life.  The patient notes no serious side effects, and feels the benefits outweigh the risks.  The patient was reminded of the pain contract that they signed previously as well as the risks and benefits of the medication including possible death.  The updated [place] [employer] prescription monitoring program was reviewed, and the patient has  been found to be compliant with current treatment plan.    - UDS next visit.     - RTC 3 weeks after above procedure.     The above plan and management options were discussed at length with patient. Patient is in agreement with the above and verbalized understanding.     Adeola Robledo NP  01/09/2024

## 2024-01-09 NOTE — H&P (VIEW-ONLY)
Chronic patient Established Note (Follow up visit)  Chronic Pain-Tele-Medicine-Established Note (Follow up visit)        The patient location is: Home  The chief complaint leading to consultation is: pain  Visit type: Virtual visit with synchronous audio and video  Total time spent with patient: 25 min  Each patient to whom he or she provides medical services by telemedicine is:  (1) informed of the relationship between the physician and patient and the respective role of any other health care provider with respect to management of the patient; and (2) notified that he or she may decline to receive medical services by telemedicine and may withdraw from such care at any time.         SUBJECTIVE:      Interval History 1/9/2024:  The patient returns to clinic today for follow up of back pain via virtual visit. She is s/p right L3,4,5 RFA on 11/27/2023. She reports 40-50% relief at this time. She did not have left sided RFA due to illness. Of note, she had a stroke last week. She began having a headache, left sided facial drooping, and slurring of her speech. She did go to the ER where she received TPA. She reports mild weakness into her left arm. She continues to report low back pain. She denies any radicular leg pain. Her pain is worse with activity. She also reports bilateral knee pain, worse with standing and walking. She is scheduled for genicular RFA in the next few weeks. She will reschedule left lumbar RFA. She is taking Percocet as needed with benefit. She denies any adverse effects. She denies any other health changes.      Interval History 10/24/23:  Alivia Thomas returns today for follow-up. Since last seen, they report their pain has been worsening. She last received a toradol shot instead of an epidural as she did not wish to have an epidural.  Today, she is here for follow-up with me to evaluate.  She is requesting to increase her oxycodone to 4 times a day instead of 3 times a day.  I had a long  discussion with her and advised her we either change the medication but not increase it, try repeating radiofrequency ablation since it worked well for her and/or spinal cord stimulator.  Yesterday, surgery was another alternative for managing her pain.  She had a CT scan that we went over the results.  She has multilevel degenerative disease with severe facet degenerative disease and multilevel spinal and foraminal stenosis.  Yesterday, she declined the surgery.  Today she is open to repeating the radiofrequency ablation but she does not want to change the medication.  Previously, the radiofrequency ablation lasted several months up to a year.  The last time she said it lasted about 3-4 month than the pain started coming back gradually over the following 3-4 months.  Overall, she still had good relief.  She is complaining of the right knee as well.  Much less pain on the left knee.  The radicular component of her back pain is in the dorsal thighs bilaterally worse on the right negligible on the left.  No new bowel or bladder symptomatology.  No fever, chills or night sweats.    Interval History 10/23/23: Alivia Thomas returns for follow up. This is a patient of Dr. Hurtado. Her visit with [place] was cancelled last week so she was placed on my schedule today. At her last visit she was having pain radiating down her legs, but did not want to have an epidural. They performed a Toradol shot (short term relief) and referred her to Neurosurgery. She saw Dr. Bartlett today who said either she will need SCS or decompression. On review of her CT and MRI, she has severe facet arthropathy and severe canal stenosis at L3/4 and L4/5. She notes being able to walk <1 block, after which she must sit down. She reports 8-10/10 pain and this is interfering with her life significantly.     Interval History 9/18/2023:  The patient presents today to request an injection for increased back pain. She has been having worsened pain over the  past couple of weeks. She does not wish to have another KERI at this time. She is having lower back pain with radiation down the back of both legs, R>L. She has associated numbness and tingling as well as subjective weakness. No bowel or bladder incontinence. She has not seen neurosurgery. Her pain today is 10/10.    Interval History 8/14/2023:  The patient is here for follow up after procedure for back pain. She is now s/p L5/S1 IL KERI with about 50% relief. She still has lower back pain with radiation down the back of the legs, stopping at the knees. She has associated numbness to lower extremities. Her pain worsens with walking, bending and reaching upward. She has been working on home exercises and stretches without much benefit. She has some benefit with medication as needed.  She report Her pain today is 9/10.    Interval History 7/10/2023:  The patient is here today for evaluation of increased lower back pain. She has a long-standing history of back pain which is mainly axial. She has had worsened symptoms over the past couple of weeks, most severe over the past 3 days. The pain now radiates down the back of both legs with burning and numbness. She finds it difficult to stand or walk for any length of time. No bowel or bladder incontinence. No fever or malaise. Medications are helping somewhat. She continues with home PT exercises as previously taught in PT. Her pain today is 10/10.    Interval History 4/10/2023:  The patient returns to clinic today for follow up of low back and knee pain via virtual visit. She is s/p right L3,4,5 RFA on 3/6/2023. Her left side procedure was rescheduled due to illness. This is scheduled for May 1st. She reports some relief of her right sided low back pain. She continues to report left sided low back pain. She denies any radicular leg pain. She does endorse morning stiffness. She continues to perform a home exercise routine. She is taking Percocet as needed for severe pain. She  denies any other health changes.     Interval History 1/30/2023:  The patient returns to clinic today for follow up of low back and knee pain. She is s/p right genicular RFA on 12/12/2022 and left genicular RFA on 1/9/2023. She reports 60% relief of her knee pain. She reports intermittent bilateral knee pain, this is tolerable at this time. She reports increased low back pain. Her pain is constant and aching in nature. Her leg pain is much improved. Her pain is worse with moving from sitting to standing. She does endorse morning stiffness. She continues to participate in physical therapy and a home exercise routine. She continues to take Percocet as needed with benefit and without adverse effects. She denies any other health changes.     Interval History 11/22/2022:  The patient returns to clinic today for follow up of low back and knee pain. She continues to report low back pain that radiates into the posterior aspect of both legs to under her feet. Her pain is worse with moving from sitting to standing. She also endorses morning stiffness. She recently started physical therapy. She has completed one session. She continues to report bilateral knee pain. She endorses worsening pain with the cold weather. Her pain is worse with standing and walking. She continues to take Percocet as needed with benefit and without adverse effects. She denies any other health changes. Her pain today is 8/10.    Interval History 10/5/2022:  She returns for follow-up.  Her knees are doing well.  She has some discomfort but not enough to do procedures.  Her main complaint is lumbar spine pain for the past few weeks that is radiating to the dorsal aspect of both lower extremities.  She has no red flag signs/symptoms.  No new bowel or bladder symptomatology.  The pain radiates from the back down to the plantar aspect of both feet.  It is activity related.  Rest helps some but it does not resolve the pain.  She has not been in therapy for a  while.  No recent imaging.  No recent weight changes.  Otherwise, no new symptomatology.  She goes regularly to her primary care physician for health maintenance.    Interval History 8/9/2022:  Alivia Thomas presents to the clinic for a follow-up appointment for low back and knee pain. She is s/p right genicular RFA on 5/9/2022 and left genicular RFA on 5/22/2022. She reports 60% relief of her knee pain. She also had panniculectomy on 6/14/2022. She is healing well. She continues to report intermittent low back pain, worse with prolonged activity. She denies any radicular leg pain. Her pain is worse prolonged standing and walking. She continues to perform a homoe exercise routine. She continues to take Percocet as needed with benefit and without adverse effects. She denies any other health changes. Her pain today is 7/10.    Interval History 4/9/2022:  The patient returns to clinic today for follow-up bilateral L3, 4, 5 RFA last month.  She reports that she is feeling very well following the procedure and reports 60-70% pain relief.  Today, she reports that her bilateral knee pain has continued to worsen, and she would like to go ahead and repeat bilateral genicular RFA as soon as possible.  She denies any new numbness, tingling, weakness, saddle anesthesia or bowel/bladder incontinence.    Interval History 12/28/2021:  The patient returns to clinic today for follow up via virtual visit. She continues to report bilateral knee pain. This pain is worse with prolonged standing and walking. She continues to report low back and buttock pain. She denies any radicular leg pain. She continues to report a home exercise routine. She continues to report benefit with Percocet. She denies any adverse effects. She denies any other health changes.    Pain Disability Index Review:      10/23/2023     3:37 PM 9/18/2023     1:25 PM 8/14/2023     8:43 AM   Last 3 PDI Scores   Pain Disability Index (PDI) 27 24 43       Pain  Medications:  Percocet 10/325 mg TID PRN pain    Opioid Contract: yes     report:  Reviewed and consistent with medication use as prescribed.    Pain Procedures:   steroid inj and visco-supplementation (series of 3) in left knee- not helpful  3/31/14 Bilateral genicular nerve blocks  2/16/16 Bilateral L2,3,4,5 MBB  3/1/16 Bilateral L2,3,4,5 MBB   4/6/16 Left L2,3,4,5 RFA- significant relief  4/20/16 Right L2,3,4,5 RFA- significant relief  10/5/16 Left L2,3,4,5 cooled RFA  10/19/16 Right L2,3,4,5 cooled RFA  4/26/17 Left L2,3,4,5 cooled RFA  5/9/17 Right L2,3,4,5 cooled RFA  12/26/2017- Left L2,3,4,5 cooled RFA  1/9/2018- Right L2,3,4,5 cooled RFA  6/26/18 Left L2,3,4,5 cooled RFA- 60% relief  7/10/18 Right L2,3,4,5 cooled RFA- 60% relief  9/28/18 TPIs- significant relief  12/27/18 Left L2,3,4,5 RFA- 70% relief  1/29/19 Right L2,3,4,5 RFA- 70% relief  6/18/19 Left L2,3,4,5 RFA- 70% relief  7/9/19 Right L2,3,4,5 RFA- 50% relief  12/19/19 Left L2,3,4,5 RFA- 80% relief  12/31/19 Right L2,3,4,5 RFA- 50% relief  3/2/2020- Right genicular nerve block- 70% relief for one month  3/12/20 Left subacromial bursa injection- 90% relief  5/18/2020- Left genicular nerve block- 70%  6/9/2020- Bilateral SI joint injections- 50% relief  10/13/2020- Right genicular RFA- 50% relief   11/3/2020- Left genicular RFA- 50% relief  12/15/2020- Left L2,3,4,5 RFA  12/29/2020- Right L2,3,4,5 RFA  4/26/2021- Left subacromial bursa'  5/11/2021- Bilateral SI joint injections   6/28/2021- Left genicular RFA  7/13/2021- Right genicular RFA  8/24/2021- Right L2,3,4,5 RFA  9/11/2021- Left L2,3,4,5 RFA  3/7/2022- Right L2,3,4,5 RFA  3/21/2022- Left L2,3,4,5 RFA  5/9/2022- Right genicular RFA  5/23/2022- Left genicular RFA  12/12/2022- Right genicular RFA  1/9/2023- Left genicular RFA  3/6/2023- Right L3,4,5 RFA  7/24/23 L5/S1 IL KERI- 50% relief  11/27/2023- Right L3,4,5 RFA    Physical Therapy/Home Exercise: yes    Imaging:   MRI Lumbar Spine  7/12/2023:  COMPARISON:  10/6/22.     FINDINGS:  Alignment: Grade 1 anterolisthesis at L3-L4 and L4-L5.  No evidence for spondylolysis.     Vertebrae: Degenerative endplate changes throughout the lower lumbar spine.  Hemangioma noted within the L5 vertebral body.  No fracture or marrow infiltrative process.     Discs: Moderate disc height loss at L3-S1.  No evidence for discitis.     Cord: Conus terminates at T12-L1 and appears unremarkable.  Cauda equina appears unremarkable.     Degenerative findings:     T12-L1: No spinal canal stenosis or neuroforaminal narrowing.     L1-L2: Mild facet arthropathy results in mild right neural foraminal narrowing.     L2-L3: Circumferential disc bulge and mild facet arthropathy result in mild effacement of the thecal sac and mild bilateral neural foraminal narrowing.     L3-L4: Uncovering of the intervertebral disc with bulge and severe facet arthropathy result in severe spinal canal stenosis and severe left, moderate right neural foraminal narrowing.     L4-L5: Uncovering of the intervertebral disc with bulge and severe facet arthropathy result in severe spinal canal stenosis and severe left, moderate right neural foraminal narrowing.     L5-S1: Circumferential disc bulge and mild facet arthropathy.  No spinal canal stenosis or neural foraminal narrowing.     Paraspinal muscles & soft tissues: Mild paraspinal muscle atrophy.  Partially visualized markedly enlarged leiomyomatous uterus noted.  There is asymmetric enlargement of the right kidney.     Impression:     1. Multilevel advanced degenerative change of the lumbar spine.  Severe spinal canal stenosis and moderate to severe neural foraminal narrowing noted at L3-L5.  2. Partially visualized markedly enlarged leiomyomatous uterus.  Recommend further evaluation pelvic ultrasound.  3. Asymmetric enlargement of the right kidney.  Recommend further evaluation with retroperitoneal ultrasound.  This report was flagged in Epic as  abnormal.    Xray Knee 3/6/2019:  FINDINGS:  Postop bilateral knee replacements.  The the the the irregularity about the left patella with soft tissue calcifications similar.  Small joint effusion.  Some irregularity of the right patella unchanged as well.     IMPRESSION:      Postop bilateral knee replacement with irregularity about the patella as above.    MRI Cervical Spine 4/24/2018:  FINDINGS:  There is reversal of the normal cervical lordosis which could be related to patient positioning versus muscle spasm.     Cervical vertebral body heights are well maintained without evidence of acute fracture or dislocation.  Marrow signal is unremarkable.     Spinal canal contents are unremarkable.  No abnormal masses or collections.     Individual levels as detailed below:     C2-3: No significant spinal canal stenosis or neuroforaminal narrowing.     C3-4: Facet arthropathy.  No significant spinal canal stenosis or neuroforaminal narrowing.     C4-5: Facet arthropathy.  Small broad-based disc osteophyte complex.  Mild right neuroforaminal narrowing.  Mild spinal canal stenosis.     C5-6: Facet arthropathy.  Uncovertebral joint spurring.  Broad-based posterior disc osteophyte complex.  Mild right neuroforaminal narrowing.  Mild spinal canal stenosis.     C6-7: Facet arthropathy.  No significant spinal canal stenosis or neuroforaminal narrowing.     C7-T1: No significant spinal canal stenosis or neuroforaminal narrowing.     Paraspinal muscles are unremarkable.  Soft tissues are intact.     IMPRESSION:      Mild multilevel cervical spondylosis with mild spinal canal stenosis and mild right neuroforaminal narrowing at C4-5 and C5-6.    Xray Lumbar Spine 9/21/2015:  Results: AP, lateral neutral, lateral flexion , lateral extension, bilateral oblique and spot views.  The alignment of the lumbar spine demonstrates a mild levoscoliosis .  11-mm anterior listhesis of L4 relative to L5 with no translational abnormalities   seen  on flexion and extension views.  The vertebral body heights  are well-maintained  , mild disk space narrowing L4-L5 and L5-S1.   Mild anterior and marginal osteophyte formation seen  throughout the lumbar spine  .  The oblique views demonstrate no   definite spondylolysis.  There is facet joint osseous hypertrophy noted at L3-L4 and L4-L5.  IMPRESSION:         The Significant spondylosis of the lumbar spine with grade 1 anterior listhesis L4 relative to L5.  Facet joint osseous hypertrophy L3-L4 and L4-5.    Allergies:   Review of patient's allergies indicates:   Allergen Reactions    Strawberry Anaphylaxis       Current Medications:   Current Outpatient Medications   Medication Sig Dispense Refill    albuterol (VENTOLIN HFA) 90 mcg/actuation inhaler INHALE TWO PUFFS INTO LUNGS EVERY 4 TO 6 HOURS AS NEEDED FOR SHORTNESS OF BREATH AND FOR WHEEZING 18 g 1    allopurinoL (ZYLOPRIM) 300 MG tablet Take 1 tablet (300 mg total) by mouth 2 (two) times daily. 180 tablet 3    aspirin 81 MG Chew Chew and swallow 1 tablet (81 mg total) by mouth once daily. 30 tablet 11    atorvastatin (LIPITOR) 80 MG tablet Take 1 tablet (80 mg total) by mouth once daily. 90 tablet 3    b complex vitamins tablet Take 1 tablet by mouth every other day.       calcium citrate/vitamin D2 (ISIAH-CITRATE ORAL) Take 500 mg by mouth 2 (two) times daily.      colchicine, gout, (MITIGARE) 0.6 mg Cap TAKE 1 CAPSULE (0.6 MG TOTAL) BY MOUTH DAILY AS NEEDED (FLARE UP). 90 capsule 1    cyanocobalamin (VITAMIN B-12) 1000 MCG tablet Take 100 mcg by mouth every morning.      diclofenac sodium (VOLTAREN) 1 % Gel Apply 2 g topically 4 (four) times daily as needed. To painful area on the feet. 500 g 5    FLUoxetine (PROZAC) 20 mg/5 mL (4 mg/mL) solution TAKE 5 MLS BY MOUTH ONCE DAILY. 450 mL 3    fluticasone propionate (FLONASE) 50 mcg/actuation nasal spray 1 SPRAY BY EACH NOSTRIL ROUTE 2 TIMES DAILY AS NEEDED FOR RHINITIS. 48 g 3    LIDOcaine (XYLOCAINE) 5 % Oint  ointment APPLY TOPICALLY AS NEEDED. 35.44 g 6    MOUNJARO 2.5 mg/0.5 mL PnIj INJECT 2.5 MG INTO THE SKIN EVERY 7 DAYS. 12 Pen 1    MULTIVIT-IRON-MIN-FOLIC ACID 3,500-18-0.4 UNIT-MG-MG ORAL CHEW Take 1 tablet by mouth 2 (two) times daily.       nystatin (MYCOSTATIN) powder Apply topically 2 (two) times daily. 60 g 3    omeprazole (PRILOSEC) 20 MG capsule TAKE 1 CAPSULE (20 MG TOTAL) BY MOUTH EVERY MORNING. 90 capsule 2    oxybutynin (DITROPAN-XL) 5 MG TR24 TAKE 1 TABLET BY MOUTH ONCE DAILY. 90 tablet 3    oxyCODONE-acetaminophen (PERCOCET)  mg per tablet Take 1 tablet by mouth every 4 (four) hours as needed for Pain. 28 tablet 0    tiZANidine (ZANAFLEX) 4 MG tablet TAKE 1 TABLET (4 MG TOTAL) BY MOUTH EVERY 8 (EIGHT) HOURS AS NEEDED (MUSCLE PAIN). 90 tablet 2    tretinoin microspheres (RETIN-A MICRO PUMP) 0.08 % GlwP Apply to affected area daily 50 g 0    urea (CARMOL) 40 % Crea Apply topically once daily. To dry skin on the feet. 120 g 5    vitamin D 185 MG Tab Take 5,000 mg by mouth every morning.        No current facility-administered medications for this visit.     Facility-Administered Medications Ordered in Other Visits   Medication Dose Route Frequency Provider Last Rate Last Admin    0.9%  NaCl infusion   Intravenous Continuous Lina Wagner MD 25 mL/hr at 12/15/20 1506 25 mL/hr at 12/15/20 1506    0.9%  NaCl infusion   Intravenous Continuous Ciro Chung MD           REVIEW OF SYSTEMS:    GENERAL:  No weight loss, malaise or fevers.  HEENT:  Negative for frequent or significant headaches.  NECK:  Negative for lumps, goiter, pain and significant neck swelling.  RESPIRATORY:  Negative for cough, wheezing or shortness of breath.  CARDIOVASCULAR:  Negative for chest pain, leg swelling or palpitations. HTN  GI:  Negative for abdominal discomfort, blood in stools or black stools or change in bowel habits. GERD  MUSCULOSKELETAL:  See HPI.  SKIN:  Negative for lesions, rash, and itching.  PSYCH:   Negative for sleep disturbance, mood disorder and recent psychosocial stressors.  HEMATOLOGY/LYMPHOLOGY:  Negative for prolonged bleeding, bruising easily or swollen nodes.  NEURO:   No history of headaches, syncope, paralysis, seizures or tremors.  ENDO: Diabetes  All other reviewed and negative other than HPI.    Past Medical History:  Past Medical History:   Diagnosis Date    Acute right MCA stroke 1/4/2024    Allergy     Anemia     Asthma     Bilateral shoulder bursitis     Cervical stenosis of spine     Chronic pain     DDD (degenerative disc disease), cervical     Depression 01/28/2019    Diabetes mellitus type II     DJD (degenerative joint disease) of knee 06/19/2014    Facet arthritis of lumbar region 12/17/2015    Facet syndrome 12/17/2015    GERD (gastroesophageal reflux disease)     Heartburn     Hyperlipidemia     Hypertension     Morbid obesity     Neuromuscular disorder     Nuclear sclerotic cataract of left eye 8/8/2023    PADDY (obstructive sleep apnea)     Proteinuria     Right carpal tunnel syndrome     Sacroiliitis 06/13/2018    Sacroiliitis     Sleep apnea     Uterine fibroid        Past Surgical History:  Past Surgical History:   Procedure Laterality Date    CARPAL TUNNEL RELEASE      CARPAL TUNNEL RELEASE  1980s    left    CARPAL TUNNEL RELEASE  2012    right    CATARACT EXTRACTION W/  INTRAOCULAR LENS IMPLANT Left 8/8/2023    Procedure: EXTRACTION, CATARACT, WITH IOL INSERTION;  Surgeon: Justin Block MD;  Location: [facility] OR;  Service: Ophthalmology;  Laterality: Left;    CATARACT EXTRACTION W/  INTRAOCULAR LENS IMPLANT Right 8/29/2023    Procedure: EXTRACTION, CATARACT, WITH IOL INSERTION;  Surgeon: Justin Block MD;  Location: [facility] OR;  Service: Ophthalmology;  Laterality: Right;    COLONOSCOPY N/A 6/15/2020    Procedure: COLONOSCOPY;  Surgeon: Jc Koo MD;  Location: [address]);  Service: Endoscopy;  Laterality: N/A;  COVID screening on 6/13/20 at [facility]  -rb  pt updated on drop off location and no visitor policy-    EPIDURAL STEROID INJECTION N/A 7/24/2023    Procedure: INJECTION, STEROID, EPIDURAL, L5/S1 SOONER DATE;  Surgeon: Israel Hurtado MD;  Location: [facility] PAIN MGT;  Service: Pain Management;  Laterality: N/A;    ESOPHAGOGASTRODUODENOSCOPY N/A 3/27/2019    Procedure: EGD (ESOPHAGOGASTRODUODENOSCOPY);  Surgeon: Robbin Bar MD;  Location: [facility] (2ND FLR);  Service: Endoscopy;  Laterality: N/A;  BMI 61.3/2nd floor case/svn    INJECTION OF JOINT Bilateral 6/9/2020    Procedure: INJECTION, JOINT, BILATERAL SI;  Surgeon: Lina Wagner MD;  Location: [facility] PAIN MGT;  Service: Pain Management;  Laterality: Bilateral;  B/L SI Joint Injection    INJECTION OF JOINT Bilateral 5/11/2021    Procedure: INJECTION, JOINT, SACROILIAC (SI) need consent;  Surgeon: Lina Wagner MD;  Location: [facility] PAIN MGT;  Service: Pain Management;  Laterality: Bilateral;    JOINT REPLACEMENT Bilateral     with 2 revisions on rt    KNEE SURGERY  3/2010    orthroscope    KNEE SURGERY  6-19-14    left TKR    LAPAROSCOPIC SLEEVE GASTRECTOMY N/A 8/28/2019    Procedure: GASTRECTOMY, SLEEVE, LAPAROSCOPIC with intraop EGD;  Surgeon: Heriberto Clements MD;  [facility];  Service: General;  Laterality: N/A;    PANNICULECTOMY N/A 6/14/2022    Procedure: PANNICULECTOMY;  Surgeon: Rhett Gray MD;  Location: [facility] OR [address] [place] [address];  Service: Plastics;  Laterality: N/A;    RADIOFREQUENCY ABLATION Right 7/9/2019    Procedure: RADIOFREQUENCY ABLATION;  Surgeon: Lina Wagner MD;  Location: [facility] PAIN MGT;  Service: Pain Management;  Laterality: Right;  RIGHT RFA L2,3,4,5  2 of 2    RADIOFREQUENCY ABLATION Left 12/19/2019    Procedure: RADIOFREQUENCY ABLATION, LEFT L2-L3-L4-L5 MEDIAL BRANCH 1 OF 2;  Surgeon: Lina Wagner MD;  Location: [facility] PAIN MGT;  Service: Pain Management;  Laterality: Left;    RADIOFREQUENCY ABLATION Right 12/31/2019    Procedure:  RADIOFREQUENCY ABLATION, RIGHT L2-L3-L4-L5  2 OF 2;  Surgeon: Lina Wagner MD;  Location: [facility] PAIN MGT;  Service: Pain Management;  Laterality: Right;    RADIOFREQUENCY ABLATION Right 10/13/2020    Procedure: RADIOFREQUENCY ABLATION, Genicular Cooled 1 of 2;  Surgeon: Lina Wagner MD;  Location: [facility] PAIN MGT;  Service: Pain Management;  Laterality: Right;    RADIOFREQUENCY ABLATION Left 11/3/2020    Procedure: RADIOFREQUENCY ABLATION, GENICULAR COOLED  2 OF 2;  Surgeon: Lina Wagner MD;  Location: [facility];  Service: Pain Management;  Laterality: Left;    RADIOFREQUENCY ABLATION Left 12/15/2020    Procedure: RADIOFREQUENCY ABLATION LEFT L2,3,4,5 1 of 2;  Surgeon: Lina Wagner MD;  Location: [facility];  Service: Pain Management;  Laterality: Left;    RADIOFREQUENCY ABLATION Right 12/29/2020    Procedure: RADIOFREQUENCY ABLATION RIGHT L2,3,4,5 2 of 2;  Surgeon: Lina Wagner MD;  Location: [facility];  Service: Pain Management;  Laterality: Right;    RADIOFREQUENCY ABLATION Left 6/29/2021    Procedure: RADIOFREQUENCY ABLATION GENICULAR COOLED;  Surgeon: Lina Wagner MD;  Location: [facility];  Service: Pain Management;  Laterality: Left;  1 of 2    RADIOFREQUENCY ABLATION Right 7/13/2021    Procedure: RADIOFREQUENCY ABLATION GENICULAR;  Surgeon: Lina Wagner MD;  Location: [facility];  Service: Pain Management;  Laterality: Right;  2 of 2    RADIOFREQUENCY ABLATION Right 8/24/2021    Procedure: RADIOFREQUENCY ABLATION, L2-L3-L4-L5 MEDIAL BRANCH 1 OF 2;  Surgeon: Lina Wagner MD;  Location: [facility];  Service: Pain Management;  Laterality: Right;    RADIOFREQUENCY ABLATION Left 9/11/2021    Procedure: RADIOFREQUENCY ABLATION, L2-L3-L4-L5 MEDIAL BR ANCH 2 OF 2;  Surgeon: Lina Wagner MD;  Location: [facility];  Service: Pain Management;  Laterality: Left;    RADIOFREQUENCY ABLATION Right 3/7/2022    Procedure: RADIOFREQUENCY ABLATION RIGHT  L3,4,5 1 of 2, needs consent;  Surgeon: Israel Hurtado MD;  Location: [facility];  Service: Pain Management;  Laterality: Right;    RADIOFREQUENCY ABLATION Left 3/21/2022    Procedure: RADIOFREQUENCY ABLATION RIGHT L3,4,5 2 of 2, needs consent;  Surgeon: Israel Hurtado MD;  Location: [facility];  Service: Pain Management;  Laterality: Left;    RADIOFREQUENCY ABLATION Right 5/9/2022    Procedure: RADIOFREQUENCY ABLATION RIGHT GENICULAR COOLED 1 of 2;  Surgeon: Israel Hurtado MD;  Location: [facility];  Service: Pain Management;  Laterality: Right;    RADIOFREQUENCY ABLATION Left 5/23/2022    Procedure: RADIOFREQUENCY ABLATION LEFT GENICULAR COOLED  2 of 2;  Surgeon: Israel Hurtado MD;  Location: [facility];  Service: Pain Management;  Laterality: Left;    RADIOFREQUENCY ABLATION Right 12/12/2022    Procedure: RADIOFREQUENCY ABLATION RIGHT GENICULAR COOLED  ONE OF TWO  NEEDS CONSENT;  Surgeon: Israel Hurtado MD;  Location: [facility];  Service: Pain Management;  Laterality: Right;    RADIOFREQUENCY ABLATION Left 1/9/2023    Procedure: RADIOFREQUENCY ABLATION LEFT GENICULAR COOLED  TWO OF TWO NEEDS CONSENT;  Surgeon: Israel Hurtado MD;  Location: [facility];  Service: Pain Management;  Laterality: Left;    RADIOFREQUENCY ABLATION Right 3/6/2023    Procedure: RADIOFREQUENCY ABLATION RIGHT L3,L4,L5;  Surgeon: Israel Hurtado MD;  Location: [facility];  Service: Pain Management;  Laterality: Right;    RADIOFREQUENCY ABLATION Left 5/22/2023    Procedure: RADIOFREQUENCY ABLATION LEFT L3,L4,L5;  Surgeon: Israel Hurtado MD;  [facility];  Service: Pain Management;  Laterality: Left;  4/3 LM TO R/S    RADIOFREQUENCY ABLATION Right 11/27/2023    Procedure: RADIOFREQUENCY ABLATION RIGHT L3, 4, 5 1 OF 3;  Surgeon: Israel Hurtado MD;  Location: [facility];  Service: Pain Management;  Laterality: Right;    RADIOFREQUENCY ABLATION OF LUMBAR MEDIAL BRANCH NERVE AT SINGLE LEVEL Left 6/26/2018    Procedure:  RADIOFREQUENCY ABLATION, NERVE, MEDIAL BRANCH, LUMBAR, 1 LEVEL;  Surgeon: Lina Wagner MD;  Location: [facility] [place];  Service: Pain Management;  Laterality: Left;  Left Cooled RFA @ L2,3,4,5  [id number]  with Sedation    1 of 2    RADIOFREQUENCY ABLATION OF LUMBAR MEDIAL BRANCH NERVE AT SINGLE LEVEL Right 7/10/2018    Procedure: RADIOFREQUENCY ABLATION, NERVE, MEDIAL BRANCH, LUMBAR, 1 LEVEL;  Surgeon: Lina Wagner MD;  Location: [facility] [place];  Service: Pain Management;  Laterality: Right;  RIght Cooled RFA @ L2,3,4,5  [id number] with Sedation    2 of 2    TRIGGER FINGER RELEASE Right 2017    TRIGGER FINGER RELEASE Left 7/29/2019    Procedure: RELEASE, TRIGGER FINGER, Left Thumb;  Surgeon: Velma Garcia MD;  Location: [facility] [place] OR;  Service: Orthopedics;  Laterality: Left;  Local w/ MAC       Family History:  Family History   Problem Relation Age of Onset    Diabetes Mother     Cataracts Mother     Diabetes Father     Cataracts Father     Hypertension Sister     Diabetes Sister     No Known Problems Sister     Cancer Sister         lymphoma     Coronary artery disease Brother     Diabetes Brother     Diabetes Brother     Hypotension Brother     Kidney failure Brother     Hypotension Brother     Amblyopia Neg Hx     Blindness Neg Hx     Glaucoma Neg Hx     Macular degeneration Neg Hx     Retinal detachment Neg Hx     Strabismus Neg Hx     Stroke Neg Hx     Thyroid disease Neg Hx     Anesthesia problems Neg Hx        Social History:  Social History     Socioeconomic History    Marital status:    Tobacco Use    Smoking status: Never    Smokeless tobacco: Never   Substance and Sexual Activity    Alcohol use: Not Currently     Comment: occasionally     Drug use: No    Sexual activity: Yes     Partners: Female     Birth control/protection: None   Social History Narrative    Disabled. The patient is the youngest of 6 siblings. Single. Lives with single-sex partner.                  Social  Determinants of Health     Financial Resource Strain: Patient Declined (6/12/2023)    Overall Financial Resource Strain (CARDIA)     Difficulty of Paying Living Expenses: Patient declined   Food Insecurity: No Food Insecurity (6/12/2023)    Hunger Vital Sign     Worried About Running Out of Food in the Last Year: Never true     Ran Out of Food in the Last Year: Never true   Transportation Needs: No Transportation Needs (6/12/2023)    PRAPARE - Transportation     Lack of Transportation (Medical): No     Lack of Transportation (Non-Medical): No   Physical Activity: Patient Declined (6/12/2023)    Exercise Vital Sign     Days of Exercise per Week: Patient declined     Minutes of Exercise per Session: Patient declined   Stress: Stress Concern Present (6/12/2023)    [employer] [place] [employer] - Occupational Stress Questionnaire     Feeling of Stress : Very much   Social Connections: Unknown (6/12/2023)    Social Connection and Isolation Panel [NHANES]     Frequency of Communication with Friends and Family: Twice a week     Frequency of Social Gatherings with Friends and Family: Never     Attends Confucianist Services: More than 4 times per year     Active Member of Clubs or Organizations: No     Attends Club or Organization Meetings: Never     Marital Status: Patient declined   Housing Stability: Unknown (6/12/2023)    Housing Stability Vital Sign     Unable to Pay for Housing in the Last Year: Patient refused     Unstable Housing in the Last Year: Patient refused       OBJECTIVE:    Limited exam due to virtual visit:  GENERAL: Well appearing, in no acute distress, alert and oriented x3.   PSYCH:  Mood and affect appropriate.     Previous physical exam:  [facility] 06/26/2018     PHYSICAL EXAMINATION:    GENERAL: Well appearing, in no acute distress, alert and oriented x3.   PSYCH:  Mood and affect appropriate. Tearful and guarding on exam.   SKIN: Skin color, texture, turgor normal, no rashes or lesions.    HEAD/FACE:  Normocephalic, atraumatic.   CV: RRR with palpation of the radial artery.  PULM: No evidence of respiratory difficulty, symmetric chest rise.  BACK: Negative SLR bilaterally. SLR is positive B to the dorsal thigh down to the knee the right worse than the left. There is pain with palpation over midline lumbar spine. Limited ROM with pain on flexion and extension.  Positive facet loading bilaterally. Guarding over the right GTB.   EXTREMITIES: No edema  MUSCULOSKELETAL: 4/5 strength in right ankle with plantar and dorsiflexion. 4/5 strength in left ankle with plantar and dorsiflexion. 5/5 strength with right knee flexion and extension. 5/5 strength with left knee flexion and extension. 5/5 strength in right EHL, 5/5 strength in left EHL.  NEURO:  Plantar response are downgoing. No clonus. Normal sensation.   GAIT: Antalgic- ambulates without assistance.      ASSESSMENT: 58 y.o. year old female with low back and knee pain, consistent with the followin. Chronic pain syndrome        2. Spondylosis of lumbar region without myelopathy or radiculopathy        3. DDD (degenerative disc disease), lumbar        4. Chronic pain of both knees        5. Primary osteoarthritis of both knees              PLAN:     - Previous imaging reviewed. Hospital notes reviewed.     - She is scheduled for genicular RFA.     - She will reschedule left lumbar RFA.     - Continue Percocet 10/325 mg TID PRN, refill provided.     - The patient is here today for a refill of current pain medications and they believe these provide effective pain control and improvements in quality of life.  The patient notes no serious side effects, and feels the benefits outweigh the risks.  The patient was reminded of the pain contract that they signed previously as well as the risks and benefits of the medication including possible death.  The updated [place] Board [employer] prescription monitoring program was reviewed, and the patient has  been found to be compliant with current treatment plan.    - UDS next visit.     - RTC 3 weeks after above procedure.     The above plan and management options were discussed at length with patient. Patient is in agreement with the above and verbalized understanding.     Adeola Robledo NP  01/09/2024

## 2024-01-10 PROCEDURE — G0180 MD CERTIFICATION HHA PATIENT: HCPCS | Mod: ,,, | Performed by: INTERNAL MEDICINE

## 2024-01-11 ENCOUNTER — TELEPHONE (OUTPATIENT)
Dept: NEUROLOGY | Facility: HOSPITAL | Age: 59
End: 2024-01-11
Payer: MEDICARE

## 2024-01-12 ENCOUNTER — TELEPHONE (OUTPATIENT)
Dept: NEUROLOGY | Facility: HOSPITAL | Age: 59
End: 2024-01-12
Payer: MEDICARE

## 2024-01-15 ENCOUNTER — PATIENT OUTREACH (OUTPATIENT)
Dept: ADMINISTRATIVE | Facility: OTHER | Age: 59
End: 2024-01-15
Payer: MEDICARE

## 2024-01-15 ENCOUNTER — TELEPHONE (OUTPATIENT)
Dept: ADMINISTRATIVE | Facility: CLINIC | Age: 59
End: 2024-01-15
Payer: MEDICARE

## 2024-01-15 NOTE — PROGRESS NOTES
CHW - Initial Contact    This Community Health Worker updated and verified the Social Determinant of Health questionnaire with patient via telephone today.    Pt identified barriers of most importance are: Patient has food Insecurities.    Referrals to community agencies completed with patient/caregiver consent outside of [facility] include: yes: [email or web address].  Referrals were put through [facility] - no: none at this time.  Support and Services: has no support at this time.  Other information discussed the patient needs / wants help with: Patient has food Insecurities.    Follow up required: yes  Initial Outreach, Follow-up Outreach - Due: 1/18/2024, 1/21/2024

## 2024-01-15 NOTE — PROGRESS NOTES
CHW - Initial Contact    This Community Health Worker updated and verified the Social Determinant of Health questionnaire with patient via telephone today.    Pt identified barriers of most importance are: Patient has food Insecurities.    Referrals to community agencies completed with patient/caregiver consent outside of [facility] include: no:[email or web address].  Referrals were put through [facility] - no: none at this time.  Support and Services: No support & services have been documented.  Other information discussed the patient needs / wants help with: Patient has food Insecurities.    Follow up required: yes.  No future outreach task assigned

## 2024-01-17 RX ORDER — TIZANIDINE 4 MG/1
4 TABLET ORAL EVERY 8 HOURS PRN
Qty: 90 TABLET | Refills: 2 | Status: SHIPPED | OUTPATIENT
Start: 2024-01-17 | End: 2024-04-09

## 2024-01-19 ENCOUNTER — OFFICE VISIT (OUTPATIENT)
Dept: INTERNAL MEDICINE | Facility: CLINIC | Age: 59
End: 2024-01-19
Payer: MEDICARE

## 2024-01-19 ENCOUNTER — HOSPITAL ENCOUNTER (OUTPATIENT)
Dept: RADIOLOGY | Facility: HOSPITAL | Age: 59
Discharge: HOME | End: 2024-01-19
Attending: INTERNAL MEDICINE
Payer: MEDICARE

## 2024-01-19 VITALS
BODY MASS INDEX: 42.55 KG/M2 | OXYGEN SATURATION: 98 % | SYSTOLIC BLOOD PRESSURE: 132 MMHG | DIASTOLIC BLOOD PRESSURE: 62 MMHG | HEIGHT: 65 IN | WEIGHT: 255.38 LBS | HEART RATE: 61 BPM

## 2024-01-19 DIAGNOSIS — I10 ESSENTIAL HYPERTENSION: ICD-10-CM

## 2024-01-19 DIAGNOSIS — M46.1 SACROILIITIS: ICD-10-CM

## 2024-01-19 DIAGNOSIS — E11.49 TYPE II DIABETES MELLITUS WITH NEUROLOGICAL MANIFESTATIONS: Primary | ICD-10-CM

## 2024-01-19 DIAGNOSIS — I70.0 AORTIC ATHEROSCLEROSIS: ICD-10-CM

## 2024-01-19 DIAGNOSIS — I63.9 CRYPTOGENIC STROKE: ICD-10-CM

## 2024-01-19 DIAGNOSIS — Z12.31 SCREENING MAMMOGRAM, ENCOUNTER FOR: ICD-10-CM

## 2024-01-19 DIAGNOSIS — M25.512 ACUTE PAIN OF LEFT SHOULDER: ICD-10-CM

## 2024-01-19 DIAGNOSIS — E78.5 HYPERLIPIDEMIA, UNSPECIFIED HYPERLIPIDEMIA TYPE: ICD-10-CM

## 2024-01-19 PROCEDURE — 73030 X-RAY EXAM OF SHOULDER: CPT | Mod: 26,LT,, | Performed by: INTERNAL MEDICINE

## 2024-01-19 PROCEDURE — 73030 X-RAY EXAM OF SHOULDER: CPT | Mod: TC,PO,LT

## 2024-01-19 PROCEDURE — 77067 SCR MAMMO BI INCL CAD: CPT | Mod: TC,PO

## 2024-01-19 PROCEDURE — 77063 BREAST TOMOSYNTHESIS BI: CPT | Mod: 26,,, | Performed by: RADIOLOGY

## 2024-01-19 PROCEDURE — 99214 OFFICE O/P EST MOD 30 MIN: CPT | Mod: S$PBB,,, | Performed by: INTERNAL MEDICINE

## 2024-01-19 PROCEDURE — 99215 OFFICE O/P EST HI 40 MIN: CPT | Mod: PBBFAC,25,PO | Performed by: INTERNAL MEDICINE

## 2024-01-19 PROCEDURE — 77067 SCR MAMMO BI INCL CAD: CPT | Mod: 26,,, | Performed by: RADIOLOGY

## 2024-01-19 PROCEDURE — 99999 PR PBB SHADOW E&M-EST. PATIENT-LVL V: CPT | Mod: PBBFAC,,, | Performed by: INTERNAL MEDICINE

## 2024-01-19 NOTE — PROGRESS NOTES
Subjective:       Patient ID: Alivia Thomas is a 58 y.o. female.    Chief Complaint: Hospital Follow Up    HPIPt had an episode in which she had L arm weakness and facial droop - Had TNK and stroke was aborted.  SHe is doing well today.  No CP or SOB. Denies palpitations.  L shoulder very painful since stroke.  Review of Systems   Respiratory:  Negative for shortness of breath (PND or orthopnea).    Cardiovascular:  Negative for chest pain (arm pain or jaw pain).   Gastrointestinal:  Negative for abdominal pain, diarrhea, nausea and vomiting.   Genitourinary:  Negative for dysuria.   Neurological:  Negative for seizures, syncope and headaches.       Objective:      Physical Exam  Constitutional:       General: She is not in acute distress.     Appearance: She is well-developed.   HENT:      Head: Normocephalic.   Eyes:      Pupils: Pupils are equal, round, and reactive to light.   Neck:      Thyroid: No thyromegaly.      Vascular: No JVD.   Cardiovascular:      Rate and Rhythm: Normal rate and regular rhythm.      Heart sounds: Normal heart sounds. No murmur heard.     No friction rub. No gallop.   Pulmonary:      Effort: Pulmonary effort is normal.      Breath sounds: Normal breath sounds. No wheezing or rales.   Abdominal:      General: Bowel sounds are normal. There is no distension.      Palpations: Abdomen is soft. There is no mass.      Tenderness: There is no abdominal tenderness. There is no guarding or rebound.   Musculoskeletal:      Cervical back: Neck supple.   Lymphadenopathy:      Cervical: No cervical adenopathy.   Skin:     General: Skin is warm and dry.   Neurological:      Mental Status: She is alert and oriented to person, place, and time.      Deep Tendon Reflexes: Reflexes are normal and symmetric.   Psychiatric:         Behavior: Behavior normal.         Thought Content: Thought content normal.         Judgment: Judgment normal.         Assessment:       1. Type II diabetes mellitus with  neurological manifestations    2. Sacroiliitis    3. Aortic atherosclerosis    4. Essential hypertension    5. Acute pain of left shoulder    6. Screening mammogram, encounter for    7. Hyperlipidemia, unspecified hyperlipidemia type    8. Cryptogenic stroke        Plan:   Type II diabetes mellitus with neurological manifestations  Controlled - continue current meds    Sacroiliitis  Per pain management  Aortic atherosclerosis  On statin  Essential hypertension  -     Hypertension Digital Medicine (HDMP) Enrollment Order  -     Hypertension Digital Medicine ([facility]): Assign Onboarding Questionnaires  Need excellent BP control  Acute pain of left shoulder  -     X-Ray Shoulder 2 or More Views Left; Future; Expected date: 01/19/2024  -     Ambulatory referral/consult to Sports Medicine; Future; Expected date: 01/26/2024    Screening mammogram, encounter for  -     Mammo Digital Screening Bilat w/ [place]; Future; Expected date: 07/19/2024    Hyperlipidemia, unspecified hyperlipidemia type  -     CBC Auto Differential; Future; Expected date: 01/19/2024  -     Comprehensive Metabolic Panel; Future; Expected date: 01/19/2024  -     Lipid Panel; Future; Expected date: 01/19/2024    Cryptogenic stroke  -     Cardiac Monitor - 3-15 Day Adult (Cupid Only); Future

## 2024-01-22 ENCOUNTER — HOSPITAL ENCOUNTER (OUTPATIENT)
Facility: OTHER | Age: 59
Discharge: HOME | End: 2024-01-22
Attending: ANESTHESIOLOGY | Admitting: ANESTHESIOLOGY
Payer: MEDICARE

## 2024-01-22 VITALS
TEMPERATURE: 98 F | HEART RATE: 60 BPM | OXYGEN SATURATION: 98 % | DIASTOLIC BLOOD PRESSURE: 70 MMHG | BODY MASS INDEX: 39.99 KG/M2 | WEIGHT: 240 LBS | RESPIRATION RATE: 16 BRPM | SYSTOLIC BLOOD PRESSURE: 134 MMHG | HEIGHT: 65 IN

## 2024-01-22 DIAGNOSIS — G89.29 CHRONIC PAIN: ICD-10-CM

## 2024-01-22 DIAGNOSIS — M47.816 SPONDYLOSIS OF LUMBAR REGION WITHOUT MYELOPATHY OR RADICULOPATHY: Primary | ICD-10-CM

## 2024-01-22 DIAGNOSIS — M25.561 CHRONIC PAIN OF RIGHT KNEE: Primary | ICD-10-CM

## 2024-01-22 DIAGNOSIS — G89.29 CHRONIC PAIN OF RIGHT KNEE: Primary | ICD-10-CM

## 2024-01-22 DIAGNOSIS — M17.11 OSTEOARTHRITIS OF RIGHT KNEE, UNSPECIFIED OSTEOARTHRITIS TYPE: ICD-10-CM

## 2024-01-22 LAB — POCT GLUCOSE: 117 MG/DL (ref 70–110)

## 2024-01-22 PROCEDURE — 25000003 PHARM REV CODE 250: Performed by: ANESTHESIOLOGY

## 2024-01-22 PROCEDURE — 64624 DSTRJ NULYT AGT GNCLR NRV: CPT | Mod: RT,,, | Performed by: ANESTHESIOLOGY

## 2024-01-22 PROCEDURE — 99152 MOD SED SAME PHYS/QHP 5/>YRS: CPT | Performed by: ANESTHESIOLOGY

## 2024-01-22 PROCEDURE — A4649 SURGICAL SUPPLIES: HCPCS | Performed by: ANESTHESIOLOGY

## 2024-01-22 PROCEDURE — 63600175 PHARM REV CODE 636 W HCPCS: Performed by: ANESTHESIOLOGY

## 2024-01-22 PROCEDURE — 64624 DSTRJ NULYT AGT GNCLR NRV: CPT | Mod: RT | Performed by: ANESTHESIOLOGY

## 2024-01-22 RX ORDER — SODIUM CHLORIDE 9 MG/ML
INJECTION, SOLUTION INTRAVENOUS CONTINUOUS
Status: DISCONTINUED | OUTPATIENT
Start: 2024-01-22 | End: 2024-01-22 | Stop reason: HOSPADM

## 2024-01-22 RX ORDER — BUPIVACAINE HYDROCHLORIDE 2.5 MG/ML
INJECTION, SOLUTION EPIDURAL; INFILTRATION; INTRACAUDAL
Status: DISCONTINUED | OUTPATIENT
Start: 2024-01-22 | End: 2024-01-22 | Stop reason: HOSPADM

## 2024-01-22 RX ORDER — FENTANYL CITRATE 50 UG/ML
INJECTION, SOLUTION INTRAMUSCULAR; INTRAVENOUS
Status: DISCONTINUED | OUTPATIENT
Start: 2024-01-22 | End: 2024-01-22 | Stop reason: HOSPADM

## 2024-01-22 RX ORDER — LIDOCAINE HYDROCHLORIDE 20 MG/ML
INJECTION, SOLUTION INFILTRATION; PERINEURAL
Status: DISCONTINUED | OUTPATIENT
Start: 2024-01-22 | End: 2024-01-22 | Stop reason: HOSPADM

## 2024-01-22 RX ORDER — MIDAZOLAM HYDROCHLORIDE 1 MG/ML
INJECTION INTRAMUSCULAR; INTRAVENOUS
Status: DISCONTINUED | OUTPATIENT
Start: 2024-01-22 | End: 2024-01-22 | Stop reason: HOSPADM

## 2024-01-22 RX ORDER — TRIAMCINOLONE ACETONIDE 40 MG/ML
INJECTION, SUSPENSION INTRA-ARTICULAR; INTRAMUSCULAR
Status: DISCONTINUED | OUTPATIENT
Start: 2024-01-22 | End: 2024-01-22 | Stop reason: HOSPADM

## 2024-01-22 NOTE — DISCHARGE INSTRUCTIONS

## 2024-01-22 NOTE — DISCHARGE SUMMARY
Discharge Note  Short Stay      SUMMARY     Admit Date: 1/22/2024    Attending Physician: Israel Hurtado      Discharge Physician: Israel Hurtado      Discharge Date: 1/22/2024 11:22 AM    Procedure(s) (LRB):  RADIOFREQUENCY ABLATION RIGHT GENICULAR 3 NERVES 3 OF 3 (Right)    Final Diagnosis: Primary osteoarthritis of both knees [M17.0]    Disposition: Home or self care    Patient Instructions:   Current Discharge Medication List        CONTINUE these medications which have NOT CHANGED    Details   albuterol (VENTOLIN HFA) 90 mcg/actuation inhaler INHALE TWO PUFFS INTO LUNGS EVERY 4 TO 6 HOURS AS NEEDED FOR SHORTNESS OF BREATH AND FOR WHEEZING  Qty: 18 g, Refills: 1    Comments: Please consider 90 day supplies to promote better adherence  Associated Diagnoses: Asthma, well controlled, mild intermittent      allopurinoL (ZYLOPRIM) 300 MG tablet Take 1 tablet (300 mg total) by mouth 2 (two) times daily.  Qty: 180 tablet, Refills: 3    Associated Diagnoses: Gout, unspecified cause, unspecified chronicity, unspecified site      aspirin 81 MG Chew Chew and swallow 1 tablet (81 mg total) by mouth once daily.  Qty: 30 tablet, Refills: 11      atorvastatin (LIPITOR) 80 MG tablet Take 1 tablet (80 mg total) by mouth once daily.  Qty: 90 tablet, Refills: 3      b complex vitamins tablet Take 1 tablet by mouth every other day.       calcium citrate/vitamin D2 (ISIAH-CITRATE ORAL) Take 500 mg by mouth 2 (two) times daily.      colchicine, gout, (MITIGARE) 0.6 mg Cap TAKE 1 CAPSULE (0.6 MG TOTAL) BY MOUTH DAILY AS NEEDED (FLARE UP).  Qty: 90 capsule, Refills: 1    Associated Diagnoses: Gout, unspecified cause, unspecified chronicity, unspecified site      cyanocobalamin (VITAMIN B-12) 1000 MCG tablet Take 100 mcg by mouth every morning.      diclofenac sodium (VOLTAREN) 1 % Gel Apply 2 g topically 4 (four) times daily as needed. To painful area on the feet.  Qty: 500 g, Refills: 5    Comments: 5 x 100g tubes  Associated Diagnoses: Right  foot pain; Enthesopathy of foot      FLUoxetine (PROZAC) 20 mg/5 mL (4 mg/mL) solution TAKE 5 MLS BY MOUTH ONCE DAILY.  Qty: 450 mL, Refills: 3      fluticasone propionate (FLONASE) 50 mcg/actuation nasal spray 1 SPRAY BY EACH NOSTRIL ROUTE 2 TIMES DAILY AS NEEDED FOR RHINITIS.  Qty: 48 g, Refills: 3      LIDOcaine (XYLOCAINE) 5 % Oint ointment APPLY TOPICALLY AS NEEDED.  Qty: 35.44 g, Refills: 6    Associated Diagnoses: Right foot pain      MOUNJARO 2.5 mg/0.5 mL PnIj INJECT 2.5 MG INTO THE SKIN EVERY 7 DAYS.  Qty: 12 Pen, Refills: 1    Associated Diagnoses: Type 2 diabetes mellitus without complication, without long-term current use of insulin      MULTIVIT-IRON-MIN-FOLIC ACID 3,500-18-0.4 UNIT-MG-MG ORAL CHEW Take 1 tablet by mouth 2 (two) times daily.       nystatin (MYCOSTATIN) powder Apply topically 2 (two) times daily.  Qty: 60 g, Refills: 3      omeprazole (PRILOSEC) 20 MG capsule TAKE 1 CAPSULE (20 MG TOTAL) BY MOUTH EVERY MORNING.  Qty: 90 capsule, Refills: 2    Associated Diagnoses: Gastroesophageal reflux disease without esophagitis      oxybutynin (DITROPAN-XL) 5 MG TR24 TAKE 1 TABLET BY MOUTH ONCE DAILY.  Qty: 90 tablet, Refills: 3    Associated Diagnoses: Mixed incontinence urge and stress (male)(female)      oxyCODONE-acetaminophen (PERCOCET)  mg per tablet Take 1 tablet by mouth every 8 (eight) hours as needed for Pain.  Qty: 90 tablet, Refills: 0    Comments: Quantity prescribed more than 7 day supply? Yes, quantity medically necessary  Associated Diagnoses: Chronic pain syndrome; Spondylosis of lumbar region without myelopathy or radiculopathy; DDD (degenerative disc disease), lumbar; Chronic pain of both knees; Primary osteoarthritis of both knees      tiZANidine (ZANAFLEX) 4 MG tablet Take 1 tablet (4 mg total) by mouth every 8 (eight) hours as needed (muscle pain).  Qty: 90 tablet, Refills: 2      tretinoin microspheres (RETIN-A MICRO PUMP) 0.08 % GlwP Apply to affected area daily  Qty:  50 g, Refills: 0    Associated Diagnoses: Plantar wart      urea (CARMOL) 40 % Crea Apply topically once daily. To dry skin on the feet.  Qty: 120 g, Refills: 5    Associated Diagnoses: Dry skin      vitamin D 185 MG Tab Take 5,000 mg by mouth every morning.                  Discharge Diagnosis: Primary osteoarthritis of both knees [M17.0]  Condition on Discharge: Stable with no complications to procedure   Diet on Discharge: Same as before.  Activity: as per instruction sheet.  Discharge to: Home with a responsible adult.  Follow up: 2-4 weeks       Please call my office or pager at [phone] if experienced any weakness or loss of sensation, fever > 101.5, pain uncontrolled with oral medications, persistent nausea/vomiting/or diarrhea, redness or drainage from the incisions, or any other worrisome concerns. If physician on call was not reached or could not communicate with our office for any reason please go to the nearest emergency department

## 2024-01-23 ENCOUNTER — CLINICAL SUPPORT (OUTPATIENT)
Dept: CARDIOLOGY | Facility: HOSPITAL | Age: 59
End: 2024-01-23
Attending: INTERNAL MEDICINE
Payer: MEDICARE

## 2024-01-23 DIAGNOSIS — I63.9 CRYPTOGENIC STROKE: ICD-10-CM

## 2024-01-23 PROCEDURE — 93271 ECG/MONITORING AND ANALYSIS: CPT

## 2024-01-23 PROCEDURE — 93272 ECG/REVIEW INTERPRET ONLY: CPT | Mod: ,,, | Performed by: STUDENT IN AN ORGANIZED HEALTH CARE EDUCATION/TRAINING PROGRAM

## 2024-01-29 ENCOUNTER — TELEPHONE (OUTPATIENT)
Dept: CARDIOLOGY | Facility: HOSPITAL | Age: 59
End: 2024-01-29
Payer: MEDICARE

## 2024-01-29 ENCOUNTER — TELEPHONE (OUTPATIENT)
Dept: INTERNAL MEDICINE | Facility: CLINIC | Age: 59
End: 2024-01-29

## 2024-01-29 DIAGNOSIS — I48.91 NEW ONSET ATRIAL FIBRILLATION: Primary | ICD-10-CM

## 2024-01-29 NOTE — TELEPHONE ENCOUNTER
Patient wearing 30 day event monitor for diagnosis  Cerebral Infarction, Unspecified     Received auto-triggered alert notification for new onset AF on January 27, 2024 at 3: 08 PM        Called patient to assess for symptoms. Pt denies symptoms of palpitations, SOB and fatigue. However, pt did state having a severe HA for a couple of days. Advised pt to contact her neurologist. Pt verbalized understanding.    Strips placed under this encounter for review. Message sent to ordering provider. Will continue to monitor until 2/22/24

## 2024-01-30 ENCOUNTER — OFFICE VISIT (OUTPATIENT)
Dept: ORTHOPEDICS | Facility: CLINIC | Age: 59
End: 2024-01-30
Payer: MEDICARE

## 2024-01-30 VITALS — WEIGHT: 240 LBS | BODY MASS INDEX: 39.99 KG/M2 | HEIGHT: 65 IN

## 2024-01-30 DIAGNOSIS — M67.912 ROTATOR CUFF DYSFUNCTION, LEFT: Primary | ICD-10-CM

## 2024-01-30 DIAGNOSIS — M25.512 ACUTE PAIN OF LEFT SHOULDER: ICD-10-CM

## 2024-01-30 PROCEDURE — 99214 OFFICE O/P EST MOD 30 MIN: CPT | Mod: S$PBB,,, | Performed by: PHYSICIAN ASSISTANT

## 2024-01-30 PROCEDURE — 99999 PR PBB SHADOW E&M-EST. PATIENT-LVL IV: CPT | Mod: PBBFAC,,, | Performed by: PHYSICIAN ASSISTANT

## 2024-01-30 PROCEDURE — 99214 OFFICE O/P EST MOD 30 MIN: CPT | Mod: PBBFAC | Performed by: PHYSICIAN ASSISTANT

## 2024-01-30 NOTE — TELEPHONE ENCOUNTER
Pt with new atrial fib - could be cause of stroke from earlier this month.  Pt advised to start eliquis ASAP, hold aspirin.    I spoke with Dr. Hearn - ok to start eliquis even though she received tenecteplase 25 days ago.    Please schedule appt with electrophysiology soon thanks.

## 2024-02-02 ENCOUNTER — PATIENT OUTREACH (OUTPATIENT)
Dept: ADMINISTRATIVE | Facility: OTHER | Age: 59
End: 2024-02-02
Payer: MEDICARE

## 2024-02-02 NOTE — PROGRESS NOTES
CHW - Case Closure    This Community Health Worker spoke to patient via telephone today.   Pt reported: Patient has no needs or request assistance at this time. Patient has graduated and agreed to case closure.   Pt denied any additional needs at this time and agrees with episode closure at this time.  Provided patient with Community Health Worker's contact information and encouraged her to contact this Community Health Worker if additional needs arise.

## 2024-02-07 ENCOUNTER — DOCUMENT SCAN (OUTPATIENT)
Dept: HOME HEALTH SERVICES | Facility: HOSPITAL | Age: 59
End: 2024-02-07
Payer: MEDICARE

## 2024-02-07 ENCOUNTER — LAB VISIT (OUTPATIENT)
Dept: LAB | Facility: HOSPITAL | Age: 59
End: 2024-02-07
Attending: INTERNAL MEDICINE
Payer: MEDICARE

## 2024-02-07 DIAGNOSIS — E78.5 HYPERLIPIDEMIA, UNSPECIFIED HYPERLIPIDEMIA TYPE: ICD-10-CM

## 2024-02-07 LAB
ALBUMIN SERPL BCP-MCNC: 4 G/DL (ref 3.5–5.2)
ALP SERPL-CCNC: 62 U/L (ref 55–135)
ALT SERPL W/O P-5'-P-CCNC: 22 U/L (ref 10–44)
ANION GAP SERPL CALC-SCNC: 8 MMOL/L (ref 8–16)
AST SERPL-CCNC: 29 U/L (ref 10–40)
BASOPHILS # BLD AUTO: 0.05 K/UL (ref 0–0.2)
BASOPHILS NFR BLD: 1.1 % (ref 0–1.9)
BILIRUB SERPL-MCNC: 0.7 MG/DL (ref 0.1–1)
BUN SERPL-MCNC: 17 MG/DL (ref 6–20)
CALCIUM SERPL-MCNC: 9.5 MG/DL (ref 8.7–10.5)
CHLORIDE SERPL-SCNC: 108 MMOL/L (ref 95–110)
CHOLEST SERPL-MCNC: 131 MG/DL (ref 120–199)
CHOLEST/HDLC SERPL: 2.1 {RATIO} (ref 2–5)
CO2 SERPL-SCNC: 25 MMOL/L (ref 23–29)
CREAT SERPL-MCNC: 0.8 MG/DL (ref 0.5–1.4)
DIFFERENTIAL METHOD BLD: NORMAL
EOSINOPHIL # BLD AUTO: 0.1 K/UL (ref 0–0.5)
EOSINOPHIL NFR BLD: 2.7 % (ref 0–8)
ERYTHROCYTE [DISTWIDTH] IN BLOOD BY AUTOMATED COUNT: 13.8 % (ref 11.5–14.5)
EST. GFR  (NO RACE VARIABLE): >60 ML/MIN/1.73 M^2
GLUCOSE SERPL-MCNC: 83 MG/DL (ref 70–110)
HCT VFR BLD AUTO: 38.2 % (ref 37–48.5)
HDLC SERPL-MCNC: 61 MG/DL (ref 40–75)
HDLC SERPL: 46.6 % (ref 20–50)
HGB BLD-MCNC: 12.4 G/DL (ref 12–16)
IMM GRANULOCYTES # BLD AUTO: 0.01 K/UL (ref 0–0.04)
IMM GRANULOCYTES NFR BLD AUTO: 0.2 % (ref 0–0.5)
LDLC SERPL CALC-MCNC: 61.8 MG/DL (ref 63–159)
LYMPHOCYTES # BLD AUTO: 1.6 K/UL (ref 1–4.8)
LYMPHOCYTES NFR BLD: 32.6 % (ref 18–48)
MCH RBC QN AUTO: 30.8 PG (ref 27–31)
MCHC RBC AUTO-ENTMCNC: 32.5 G/DL (ref 32–36)
MCV RBC AUTO: 95 FL (ref 82–98)
MONOCYTES # BLD AUTO: 0.5 K/UL (ref 0.3–1)
MONOCYTES NFR BLD: 11.2 % (ref 4–15)
NEUTROPHILS # BLD AUTO: 2.5 K/UL (ref 1.8–7.7)
NEUTROPHILS NFR BLD: 52.2 % (ref 38–73)
NONHDLC SERPL-MCNC: 70 MG/DL
NRBC BLD-RTO: 0 /100 WBC
PLATELET # BLD AUTO: 201 K/UL (ref 150–450)
PMV BLD AUTO: 11.9 FL (ref 9.2–12.9)
POTASSIUM SERPL-SCNC: 4 MMOL/L (ref 3.5–5.1)
PROT SERPL-MCNC: 7.6 G/DL (ref 6–8.4)
RBC # BLD AUTO: 4.02 M/UL (ref 4–5.4)
SODIUM SERPL-SCNC: 141 MMOL/L (ref 136–145)
TRIGL SERPL-MCNC: 41 MG/DL (ref 30–150)
WBC # BLD AUTO: 4.75 K/UL (ref 3.9–12.7)

## 2024-02-07 PROCEDURE — 80053 COMPREHEN METABOLIC PANEL: CPT | Performed by: INTERNAL MEDICINE

## 2024-02-07 PROCEDURE — 36415 COLL VENOUS BLD VENIPUNCTURE: CPT | Mod: PN | Performed by: INTERNAL MEDICINE

## 2024-02-07 PROCEDURE — 85025 COMPLETE CBC W/AUTO DIFF WBC: CPT | Performed by: INTERNAL MEDICINE

## 2024-02-07 PROCEDURE — 80061 LIPID PANEL: CPT | Performed by: INTERNAL MEDICINE

## 2024-02-08 DIAGNOSIS — I49.8 OTHER CARDIAC ARRHYTHMIA: Primary | ICD-10-CM

## 2024-02-12 ENCOUNTER — TELEPHONE (OUTPATIENT)
Dept: PAIN MEDICINE | Facility: CLINIC | Age: 59
End: 2024-02-12
Payer: MEDICARE

## 2024-02-12 ENCOUNTER — HOSPITAL ENCOUNTER (OUTPATIENT)
Facility: OTHER | Age: 59
Discharge: HOME | End: 2024-02-12
Attending: ANESTHESIOLOGY | Admitting: ANESTHESIOLOGY
Payer: MEDICARE

## 2024-02-12 VITALS
RESPIRATION RATE: 16 BRPM | HEART RATE: 63 BPM | DIASTOLIC BLOOD PRESSURE: 75 MMHG | HEIGHT: 65 IN | SYSTOLIC BLOOD PRESSURE: 134 MMHG | TEMPERATURE: 98 F | BODY MASS INDEX: 39.99 KG/M2 | WEIGHT: 240 LBS | OXYGEN SATURATION: 98 %

## 2024-02-12 DIAGNOSIS — G89.4 CHRONIC PAIN SYNDROME: ICD-10-CM

## 2024-02-12 DIAGNOSIS — G89.29 CHRONIC PAIN: ICD-10-CM

## 2024-02-12 DIAGNOSIS — M47.816 LUMBAR SPONDYLOSIS: ICD-10-CM

## 2024-02-12 DIAGNOSIS — M25.562 CHRONIC PAIN OF BOTH KNEES: ICD-10-CM

## 2024-02-12 DIAGNOSIS — M51.36 DDD (DEGENERATIVE DISC DISEASE), LUMBAR: ICD-10-CM

## 2024-02-12 DIAGNOSIS — M17.0 PRIMARY OSTEOARTHRITIS OF BOTH KNEES: ICD-10-CM

## 2024-02-12 DIAGNOSIS — M47.896 OTHER OSTEOARTHRITIS OF SPINE, LUMBAR REGION: Primary | ICD-10-CM

## 2024-02-12 DIAGNOSIS — M25.561 CHRONIC PAIN OF BOTH KNEES: ICD-10-CM

## 2024-02-12 DIAGNOSIS — G89.29 CHRONIC PAIN OF BOTH KNEES: ICD-10-CM

## 2024-02-12 DIAGNOSIS — M47.816 SPONDYLOSIS OF LUMBAR REGION WITHOUT MYELOPATHY OR RADICULOPATHY: ICD-10-CM

## 2024-02-12 LAB — POCT GLUCOSE: 95 MG/DL (ref 70–110)

## 2024-02-12 PROCEDURE — 63600175 PHARM REV CODE 636 W HCPCS: Mod: JZ,JG | Performed by: ANESTHESIOLOGY

## 2024-02-12 PROCEDURE — 82947 ASSAY GLUCOSE BLOOD QUANT: CPT | Performed by: ANESTHESIOLOGY

## 2024-02-12 PROCEDURE — 64635 DESTROY LUMB/SAC FACET JNT: CPT | Mod: LT,ICN,, | Performed by: ANESTHESIOLOGY

## 2024-02-12 PROCEDURE — 99152 MOD SED SAME PHYS/QHP 5/>YRS: CPT | Performed by: ANESTHESIOLOGY

## 2024-02-12 PROCEDURE — 64635 DESTROY LUMB/SAC FACET JNT: CPT | Mod: LT | Performed by: ANESTHESIOLOGY

## 2024-02-12 PROCEDURE — 99152 MOD SED SAME PHYS/QHP 5/>YRS: CPT | Mod: ICN,,, | Performed by: ANESTHESIOLOGY

## 2024-02-12 PROCEDURE — 25000003 PHARM REV CODE 250: Performed by: ANESTHESIOLOGY

## 2024-02-12 PROCEDURE — 64636 DESTROY L/S FACET JNT ADDL: CPT | Mod: LT,ICN,, | Performed by: ANESTHESIOLOGY

## 2024-02-12 PROCEDURE — 64636 DESTROY L/S FACET JNT ADDL: CPT | Mod: LT | Performed by: ANESTHESIOLOGY

## 2024-02-12 RX ORDER — BUPIVACAINE HYDROCHLORIDE 2.5 MG/ML
INJECTION, SOLUTION EPIDURAL; INFILTRATION; INTRACAUDAL
Status: DISCONTINUED | OUTPATIENT
Start: 2024-02-12 | End: 2024-02-12 | Stop reason: HOSPADM

## 2024-02-12 RX ORDER — OXYCODONE AND ACETAMINOPHEN 10; 325 MG/1; MG/1
1 TABLET ORAL EVERY 8 HOURS PRN
Qty: 90 TABLET | Refills: 0 | Status: SHIPPED | OUTPATIENT
Start: 2024-02-12 | End: 2024-03-06 | Stop reason: SDUPTHER

## 2024-02-12 RX ORDER — FENTANYL CITRATE 50 UG/ML
INJECTION, SOLUTION INTRAMUSCULAR; INTRAVENOUS
Status: DISCONTINUED | OUTPATIENT
Start: 2024-02-12 | End: 2024-02-12 | Stop reason: HOSPADM

## 2024-02-12 RX ORDER — DEXAMETHASONE SODIUM PHOSPHATE 10 MG/ML
INJECTION INTRAMUSCULAR; INTRAVENOUS
Status: DISCONTINUED | OUTPATIENT
Start: 2024-02-12 | End: 2024-02-12 | Stop reason: HOSPADM

## 2024-02-12 RX ORDER — LIDOCAINE HYDROCHLORIDE 20 MG/ML
INJECTION, SOLUTION INFILTRATION; PERINEURAL
Status: DISCONTINUED | OUTPATIENT
Start: 2024-02-12 | End: 2024-02-12 | Stop reason: HOSPADM

## 2024-02-12 RX ORDER — SODIUM CHLORIDE 9 MG/ML
INJECTION, SOLUTION INTRAVENOUS CONTINUOUS
Status: DISCONTINUED | OUTPATIENT
Start: 2024-02-12 | End: 2024-02-12 | Stop reason: HOSPADM

## 2024-02-12 RX ORDER — MIDAZOLAM HYDROCHLORIDE 1 MG/ML
INJECTION INTRAMUSCULAR; INTRAVENOUS
Status: DISCONTINUED | OUTPATIENT
Start: 2024-02-12 | End: 2024-02-12 | Stop reason: HOSPADM

## 2024-02-12 NOTE — OP NOTE
Therapeutic Lumbar Medial Branch Radiofrequency Ablation under Fluoroscopy     The procedure, risks, benefits, and options were discussed with the patient. There are no contraindications to the procedure. The patent expressed understanding and agreed to the procedure. Informed written consent was obtained prior to the start of the procedure and can be found in the patient's chart.        PATIENT NAME: Alivia Thomas   MRN: [id number]     DATE OF PROCEDURE: 02/12/2024     PROCEDURE:  Left L3, L4, and L5 Lumbar Radiofrequency Ablation under Fluoroscopy    PRE-OP DIAGNOSIS: Spondylosis of lumbar region without myelopathy or radiculopathy [M47.816] Lumbar spondylosis [M47.816]    POST-OP DIAGNOSIS: Same    PHYSICIAN: Israel Hurtado MD    ASSISTANTS: Bam Duque, [facility] Fellow       MEDICATIONS INJECTED:  Preservative-free Decadron 10mg with 9cc of Bupivicaine 0.25%    LOCAL ANESTHETIC INJECTED:   Xylocaine 2%    SEDATION: Versed 2mg and Fentanyl 25mcg                                                                                                                                                                                     Conscious sedation ordered by M.D. Patient re-evaluation prior to administration of conscious sedation. No changes noted in patient's status from initial evaluation. The patient's vital signs were monitored by RN and patient remained hemodynamically stable throughout the procedure.    Event Time In   Sedation Start 1022   Sedation End 1043       ESTIMATED BLOOD LOSS:  None    COMPLICATIONS:  None     INTERVAL HISTORY: Patient has clinical and imaging findings suggestive of facet mediated pain. Patients has completed 2 previous diagnostic medial branch blocks at specified levels with at least 80% relief for the expected duration of the local anesthetic utilized.    TECHNIQUE: Time-out was performed to identify the patient and procedure to be performed. With the patient laying in a prone  position, the surgical area was prepped and draped in the usual sterile fashion using ChloraPrep and fenestrated drape. The levels were determined under fluoroscopic guidance. Skin anesthesia was achieved by injecting Lidocaine 2% over the injection sites. A 20 gauge 10mm curved active tip needle was introduced to the anatomic local of the medial branch at each of the above levels using AP, lateral and/or contralateral oblique fluoroscopic imaging. Then sensory and motor testing was performed to confirm that the needle tips were in the correct location. After negative aspiration for blood or CSF was confirmed, 1 mL of the lidocaine 2% listed above was injected slowly at each site. This was followed by thermal lesioning at 80 degrees celsius for 90 seconds. That was followed by slowly injecting 1.5 mL of the medication mixture listed above at each site. The needles were removed and bleeding was nil. A sterile dressing was applied. No specimens collected. The patient tolerated the procedure well and did not have any procedure related motor deficit at the conclusion of the procedure.    PRE-PROCEDURE PAIN SCORE: 8/10    POST-PROCEDURE PAIN SCORE: 0/10    The patient was monitored after the procedure in the recovery area. They were given post-procedure and discharge instructions to follow at home. The patient was discharged in a stable condition.        Israel Hurtado MD

## 2024-02-12 NOTE — TELEPHONE ENCOUNTER
----- Message from Dianne Draper sent at 2/9/2024  4:24 PM CST -----   Name of Who is Calling:     What is the request in detail:  patient request call back in reference to medication  oxyCODONE-acetaminophen (PERCOCET)  mg per tablet  / patient state this is vale gras weekend  and want to know if medication will be refilled / patient waiting for staff call back / patient state she will check status in 10 mins    Please contact to further discuss and advise      Can the clinic reply by [facility]:     What Number to Call Back if not in [facility]:   [phone]

## 2024-02-12 NOTE — TELEPHONE ENCOUNTER
----- Message from Jorge Luis Jean sent at 2/12/2024 11:32 AM CST -----  Regarding: Return Call    Who Called:  Patient      Who Left Message for Patient:  Ronald      Does the patient know what this is regarding? Yes        Best Call Back Number:  [phone]         Additional Information: Patient is returning a call.  Please assist. Patient stated her Pharmacy close early and would like to get her medication soon, Patient would like to have her medication sent [facility] - [address]

## 2024-02-12 NOTE — H&P
HPI  Patient presenting for Procedure(s) (LRB):  RADIOFREQUENCY ABLATION LEFT L3, 4, 5 (Left)     Patient on Anti-coagulation Yes. Apixaban    No health changes since previous encounter    Past Medical History:   Diagnosis Date    Acute right MCA stroke 1/4/2024    Allergy     Anemia     Asthma     Bilateral shoulder bursitis     Cervical stenosis of spine     Chronic pain     DDD (degenerative disc disease), cervical     Depression 01/28/2019    Diabetes mellitus type II     DJD (degenerative joint disease) of knee 06/19/2014    Facet arthritis of lumbar region 12/17/2015    Facet syndrome 12/17/2015    GERD (gastroesophageal reflux disease)     Heartburn     Hyperlipidemia     Hypertension     Morbid obesity     Neuromuscular disorder     Nuclear sclerotic cataract of left eye 8/8/2023    PADDY (obstructive sleep apnea)     Proteinuria     Right carpal tunnel syndrome     Sacroiliitis 06/13/2018    Sacroiliitis     Sleep apnea     Uterine fibroid      Past Surgical History:   Procedure Laterality Date    CARPAL TUNNEL RELEASE      CARPAL TUNNEL RELEASE  1980s    left    CARPAL TUNNEL RELEASE  2012    right    CATARACT EXTRACTION W/  INTRAOCULAR LENS IMPLANT Left 8/8/2023    Procedure: EXTRACTION, CATARACT, WITH IOL INSERTION;  Surgeon: Justin Block MD;  Location: [facility] OR;  Service: Ophthalmology;  Laterality: Left;    CATARACT EXTRACTION W/  INTRAOCULAR LENS IMPLANT Right 8/29/2023    Procedure: EXTRACTION, CATARACT, WITH IOL INSERTION;  Surgeon: Justin Block MD;  Location: [facility] OR;  Service: Ophthalmology;  Laterality: Right;    COLONOSCOPY N/A 6/15/2020    Procedure: COLONOSCOPY;  Surgeon: Jc Koo MD;  Location: [address]);  Service: Endoscopy;  Laterality: N/A;  COVID screening on 6/13/20 at [facility]-rb  pt updated on drop off location and no visitor policy-rb    EPIDURAL STEROID INJECTION N/A 7/24/2023    Procedure: INJECTION, STEROID, EPIDURAL, L5/S1 SOONER DATE;   Surgeon: Israel Hurtado MD;  Location: [facility]GT;  Service: Pain Management;  Laterality: N/A;    ESOPHAGOGASTRODUODENOSCOPY N/A 3/27/2019    Procedure: EGD (ESOPHAGOGASTRODUODENOSCOPY);  Surgeon: Robbin Bar MD;  Location: [facility] (2ND FLR);  Service: Endoscopy;  Laterality: N/A;  BMI 61.3/2nd floor case/svn    INJECTION OF JOINT Bilateral 6/9/2020    Procedure: INJECTION, JOINT, BILATERAL SI;  Surgeon: Lina Wagner MD;  Location: [facility];  Service: Pain Management;  Laterality: Bilateral;  B/L SI Joint Injection    INJECTION OF JOINT Bilateral 5/11/2021    Procedure: INJECTION, JOINT, SACROILIAC (SI) need consent;  Surgeon: Lina Wagner MD;  Location: [facility] PAIN T;  Service: Pain Management;  Laterality: Bilateral;    JOINT REPLACEMENT Bilateral     with 2 revisions on rt    KNEE SURGERY  3/2010    orthroscope    KNEE SURGERY  6-19-14    left TKR    LAPAROSCOPIC SLEEVE GASTRECTOMY N/A 8/28/2019    Procedure: GASTRECTOMY, SLEEVE, LAPAROSCOPIC with intraop EGD;  Surgeon: Heriberto Clements MD;  Location: [facility] OR 2ND FLR;  Service: General;  Laterality: N/A;    PANNICULECTOMY N/A 6/14/2022    Procedure: PANNICULECTOMY;  Surgeon: Rhett Gray MD;  Location: [facility] OR 2ND FLR;  Service: Plastics;  Laterality: N/A;    RADIOFREQUENCY ABLATION Right 7/9/2019    Procedure: RADIOFREQUENCY ABLATION;  Surgeon: Lina Wagner MD;  Location: [facility] PAIN T;  Service: Pain Management;  Laterality: Right;  RIGHT RFA L2,3,4,5  2 of 2    RADIOFREQUENCY ABLATION Left 12/19/2019    Procedure: RADIOFREQUENCY ABLATION, LEFT L2-L3-L4-L5 MEDIAL BRANCH 1 OF 2;  Surgeon: Lina Wagner MD;  Location: [facility];  Service: Pain Management;  Laterality: Left;    RADIOFREQUENCY ABLATION Right 12/31/2019    Procedure: RADIOFREQUENCY ABLATION, RIGHT L2-L3-L4-L5  2 OF 2;  Surgeon: Lina Wagner MD;  Location: [facility] PAIN [facility]GT;  Service: Pain Management;  Laterality: Right;    RADIOFREQUENCY  ABLATION Right 10/13/2020    Procedure: RADIOFREQUENCY ABLATION, Genicular Cooled 1 of 2;  Surgeon: Lina Wagner MD;  Location: [facility] PAIN MGT;  Service: Pain Management;  Laterality: Right;    RADIOFREQUENCY ABLATION Left 11/3/2020    Procedure: RADIOFREQUENCY ABLATION, GENICULAR COOLED  2 OF 2;  Surgeon: Lina Wagner MD;  Location: [facility] PAIN MGT;  Service: Pain Management;  Laterality: Left;    RADIOFREQUENCY ABLATION Left 12/15/2020    Procedure: RADIOFREQUENCY ABLATION LEFT L2,3,4,5 1 of 2;  Surgeon: Lina Wagner MD;  Location: [facility] PAIN MGT;  Service: Pain Management;  Laterality: Left;    RADIOFREQUENCY ABLATION Right 12/29/2020    Procedure: RADIOFREQUENCY ABLATION RIGHT L2,3,4,5 2 of 2;  Surgeon: Lina Wagner MD;  Location: [facility] PAIN MGT;  Service: Pain Management;  Laterality: Right;    RADIOFREQUENCY ABLATION Left 6/29/2021    Procedure: RADIOFREQUENCY ABLATION GENICULAR COOLED;  Surgeon: Lina Wagner MD;  Location: [facility] PAIN MGT;  Service: Pain Management;  Laterality: Left;  1 of 2    RADIOFREQUENCY ABLATION Right 7/13/2021    Procedure: RADIOFREQUENCY ABLATION GENICULAR;  Surgeon: Lina Wagner MD;  Location: [facility] PAIN [facility]GT;  Service: Pain Management;  Laterality: Right;  2 of 2    RADIOFREQUENCY ABLATION Right 8/24/2021    Procedure: RADIOFREQUENCY ABLATION, L2-L3-L4-L5 MEDIAL BRANCH 1 OF 2;  Surgeon: Lina Wagner MD;  Location: [facility] PAIN [facility]GT;  Service: Pain Management;  Laterality: Right;    RADIOFREQUENCY ABLATION Left 9/11/2021    Procedure: RADIOFREQUENCY ABLATION, L2-L3-L4-L5 MEDIAL BR ANCH 2 OF 2;  Surgeon: Lina Wagner MD;  Location: [facility]IN [facility]GT;  Service: Pain Management;  Laterality: Left;    RADIOFREQUENCY ABLATION Right 3/7/2022    Procedure: RADIOFREQUENCY ABLATION RIGHT L3,4,5 1 of 2, needs consent;  Surgeon: Israel Hurtado MD;  Location: [facility]GT;  Service: Pain Management;  Laterality: Right;    RADIOFREQUENCY ABLATION Left 3/21/2022     Procedure: RADIOFREQUENCY ABLATION RIGHT L3,4,5 2 of 2, needs consent;  Surgeon: Israel Hurtado MD;  Location: [facility];  Service: Pain Management;  Laterality: Left;    RADIOFREQUENCY ABLATION Right 5/9/2022    Procedure: RADIOFREQUENCY ABLATION RIGHT GENICULAR COOLED 1 of 2;  Surgeon: Israel Hurtado MD;  Location: [facility];  Service: Pain Management;  Laterality: Right;    RADIOFREQUENCY ABLATION Left 5/23/2022    Procedure: RADIOFREQUENCY ABLATION LEFT GENICULAR COOLED  2 of 2;  Surgeon: Israel Hurtado MD;  Location: [facility];  Service: Pain Management;  Laterality: Left;    RADIOFREQUENCY ABLATION Right 12/12/2022    Procedure: RADIOFREQUENCY ABLATION RIGHT GENICULAR COOLED  ONE OF TWO  NEEDS CONSENT;  Surgeon: Israel Hurtado MD;  Location: [facility];  Service: Pain Management;  Laterality: Right;    RADIOFREQUENCY ABLATION Left 1/9/2023    Procedure: RADIOFREQUENCY ABLATION LEFT GENICULAR COOLED  TWO OF TWO NEEDS CONSENT;  Surgeon: Israel Hurtado MD;  Location: [facility];  Service: Pain Management;  Laterality: Left;    RADIOFREQUENCY ABLATION Right 3/6/2023    Procedure: RADIOFREQUENCY ABLATION RIGHT L3,L4,L5;  Surgeon: Israel Hurtado MD;  Location: [facility];  Service: Pain Management;  Laterality: Right;    RADIOFREQUENCY ABLATION Left 5/22/2023    Procedure: RADIOFREQUENCY ABLATION LEFT L3,L4,L5;  Surgeon: Israel Hurtado MD;  Location: [facility];  Service: Pain Management;  Laterality: Left;  4/3 LM TO R/S    RADIOFREQUENCY ABLATION Right 11/27/2023    Procedure: RADIOFREQUENCY ABLATION RIGHT L3, 4, 5 1 OF 3;  Surgeon: Israel Hurtado MD;  Location: [facility];  Service: Pain Management;  Laterality: Right;    RADIOFREQUENCY ABLATION Right 1/22/2024    Procedure: RADIOFREQUENCY ABLATION RIGHT GENICULAR 3 NERVES 3 OF 3;  Surgeon: Israel Hurtado MD;  Location: [facility];  Service: Pain Management;  Laterality: Right;    RADIOFREQUENCY ABLATION OF LUMBAR MEDIAL BRANCH NERVE AT SINGLE LEVEL  Left 6/26/2018    Procedure: RADIOFREQUENCY ABLATION, NERVE, MEDIAL BRANCH, LUMBAR, 1 LEVEL;  Surgeon: Lina Wagner MD;  Location: [facility] PAIN MGT;  Service: Pain Management;  Laterality: Left;  Left Cooled RFA @ L2,3,4,5  [id number]  with Sedation    1 of 2    RADIOFREQUENCY ABLATION OF LUMBAR MEDIAL BRANCH NERVE AT SINGLE LEVEL Right 7/10/2018    Procedure: RADIOFREQUENCY ABLATION, NERVE, MEDIAL BRANCH, LUMBAR, 1 LEVEL;  Surgeon: Lina Wagner MD;  Location: [facility] PAIN MGT;  Service: Pain Management;  Laterality: Right;  RIght Cooled RFA @ L2,3,4,5  [id number] with Sedation    2 of 2    TRIGGER FINGER RELEASE Right 2017    TRIGGER FINGER RELEASE Left 7/29/2019    Procedure: RELEASE, TRIGGER FINGER, Left Thumb;  Surgeon: Velma Garcia MD;  Location: [facility] OR;  Service: Orthopedics;  Laterality: Left;  Local w/ MAC     Review of patient's allergies indicates:   Allergen Reactions    Strawberry Anaphylaxis      No current facility-administered medications for this encounter.     Facility-Administered Medications Ordered in Other Encounters   Medication    0.9%  NaCl infusion    0.9%  NaCl infusion       PMHx, PSHx, Allergies, Medications reviewed in epic    ROS negative except pain complaints in HPI    OBJECTIVE:    LMP 06/26/2018     PHYSICAL EXAMINATION:    GENERAL: Well appearing, in no acute distress, alert and oriented x3.  PSYCH:  Mood and affect appropriate.  SKIN: Skin color, texture, turgor normal, no rashes or lesions which will impact the procedure.  CV: RRR with palpation of the radial artery.  PULM: No evidence of respiratory difficulty, symmetric chest rise. Clear to auscultation.  NEURO: Cranial nerves grossly intact.    Plan:    Proceed with procedure as planned Procedure(s) (LRB):  RADIOFREQUENCY ABLATION LEFT L3, 4, 5 (Left)    Noemí Janett  02/12/2024

## 2024-02-12 NOTE — TELEPHONE ENCOUNTER
----- Message from Priyanka Doan sent at 2/12/2024 11:18 AM CST -----  Regarding: REFILL  Who Called:DONTAE MOON [[id number]]          RX Name and Strength:oxycodone-acetaminophen 10 mg (PERCOCET) 10 mg per tablet             Is this a 30 day or 90 day RX: 30          Preferred Pharmacy with phone number:  [facility] - [address]            Local or Mail Order: LOCAL

## 2024-02-12 NOTE — DISCHARGE SUMMARY
Discharge Note  Short Stay      SUMMARY     Admit Date: 2/12/2024    Attending Physician: Israel Hurtado      Discharge Physician: Israel Hurtado      Discharge Date: 2/12/2024 10:46 AM    Procedure(s) (LRB):  RADIOFREQUENCY ABLATION LEFT L3, 4, 5 (Left)    Final Diagnosis: Spondylosis of lumbar region without myelopathy or radiculopathy [M47.816]    Disposition: Home or self care    Patient Instructions:   Current Discharge Medication List        CONTINUE these medications which have NOT CHANGED    Details   albuterol (VENTOLIN HFA) 90 mcg/actuation inhaler INHALE TWO PUFFS INTO LUNGS EVERY 4 TO 6 HOURS AS NEEDED FOR SHORTNESS OF BREATH AND FOR WHEEZING  Qty: 18 g, Refills: 1    Comments: Please consider 90 day supplies to promote better adherence  Associated Diagnoses: Asthma, well controlled, mild intermittent      allopurinoL (ZYLOPRIM) 300 MG tablet Take 1 tablet (300 mg total) by mouth 2 (two) times daily.  Qty: 180 tablet, Refills: 3    Associated Diagnoses: Gout, unspecified cause, unspecified chronicity, unspecified site      apixaban (ELIQUIS) 5 mg Tab Take 1 tablet (5 mg total) by mouth 2 (two) times daily.  Qty: 60 tablet, Refills: 6      aspirin 81 MG Chew Chew and swallow 1 tablet (81 mg total) by mouth once daily.  Qty: 30 tablet, Refills: 11      atorvastatin (LIPITOR) 80 MG tablet Take 1 tablet (80 mg total) by mouth once daily.  Qty: 90 tablet, Refills: 3      b complex vitamins tablet Take 1 tablet by mouth every other day.       calcium citrate/vitamin D2 (ISIAH-CITRATE ORAL) Take 500 mg by mouth 2 (two) times daily.      colchicine, gout, (MITIGARE) 0.6 mg Cap TAKE 1 CAPSULE (0.6 MG TOTAL) BY MOUTH DAILY AS NEEDED (FLARE UP).  Qty: 90 capsule, Refills: 1    Associated Diagnoses: Gout, unspecified cause, unspecified chronicity, unspecified site      cyanocobalamin (VITAMIN B-12) 1000 MCG tablet Take 100 mcg by mouth every morning.      diclofenac sodium (VOLTAREN) 1 % Gel Apply 2 g topically 4 (four)  times daily as needed. To painful area on the feet.  Qty: 500 g, Refills: 5    Comments: 5 x 100g tubes  Associated Diagnoses: Right foot pain; Enthesopathy of foot      FLUoxetine (PROZAC) 20 mg/5 mL (4 mg/mL) solution TAKE 5 MLS BY MOUTH ONCE DAILY.  Qty: 450 mL, Refills: 3      fluticasone propionate (FLONASE) 50 mcg/actuation nasal spray 1 SPRAY BY EACH NOSTRIL ROUTE 2 TIMES DAILY AS NEEDED FOR RHINITIS.  Qty: 48 g, Refills: 3      LIDOcaine (XYLOCAINE) 5 % Oint ointment APPLY TOPICALLY AS NEEDED.  Qty: 35.44 g, Refills: 6    Associated Diagnoses: Right foot pain      MOUNJARO 2.5 mg/0.5 mL PnIj INJECT 2.5 MG INTO THE SKIN EVERY 7 DAYS.  Qty: 12 Pen, Refills: 1    Associated Diagnoses: Type 2 diabetes mellitus without complication, without long-term current use of insulin      MULTIVIT-IRON-MIN-FOLIC ACID 3,500-18-0.4 UNIT-MG-MG ORAL CHEW Take 1 tablet by mouth 2 (two) times daily.       nystatin (MYCOSTATIN) powder Apply topically 2 (two) times daily.  Qty: 60 g, Refills: 3      omeprazole (PRILOSEC) 20 MG capsule TAKE 1 CAPSULE (20 MG TOTAL) BY MOUTH EVERY MORNING.  Qty: 90 capsule, Refills: 2    Associated Diagnoses: Gastroesophageal reflux disease without esophagitis      oxybutynin (DITROPAN-XL) 5 MG TR24 TAKE 1 TABLET BY MOUTH ONCE DAILY.  Qty: 90 tablet, Refills: 3    Associated Diagnoses: Mixed incontinence urge and stress (male)(female)      tiZANidine (ZANAFLEX) 4 MG tablet Take 1 tablet (4 mg total) by mouth every 8 (eight) hours as needed (muscle pain).  Qty: 90 tablet, Refills: 2      tretinoin microspheres (RETIN-A MICRO PUMP) 0.08 % GlwP Apply to affected area daily  Qty: 50 g, Refills: 0    Associated Diagnoses: Plantar wart      urea (CARMOL) 40 % Crea Apply topically once daily. To dry skin on the feet.  Qty: 120 g, Refills: 5    Associated Diagnoses: Dry skin      vitamin D 185 MG Tab Take 5,000 mg by mouth every morning.                  Discharge Diagnosis: Spondylosis of lumbar region  without myelopathy or radiculopathy [M47.816]  Condition on Discharge: Stable with no complications to procedure   Diet on Discharge: Same as before.  Activity: as per instruction sheet.  Discharge to: Home with a responsible adult.  Follow up: 2-4 weeks       Please call my office or pager at [phone] if experienced any weakness or loss of sensation, fever > 101.5, pain uncontrolled with oral medications, persistent nausea/vomiting/or diarrhea, redness or drainage from the incisions, or any other worrisome concerns. If physician on call was not reached or could not communicate with our office for any reason please go to the nearest emergency department

## 2024-02-12 NOTE — TELEPHONE ENCOUNTER
Patient requesting refill on oxyCODONE-acetaminophen (PERCOCET)  mg   Last office visit 01/09/24   shows last refill on 01/13/24  Patient does not have a pain contract on file with [facility] department  Patient last UDS [id number] was consistent with current therapy    CODEINE  Not Detected  Not Detected Not Detected  Not Detected    MORPHINE  Not Detected  Not Detected Not Detected  Not Detected    6-ACETYLMORPHINE  Not Detected  Not Detected Not Detected  Not Detected    OXYCODONE  Present  Present Present  Not Detected    NOROYXCODONE  Present  Present Present  Present    OXYMORPHONE  Present  Present Present  Not Detected    NOROXYMORPHONE  Not Detected  Not Detected Not Detected  Not Detected    HYDROCODONE  Not Detected  Not Detected Not Detected  Not Detected    NORHYDROCODONE  Not Detected  Not Detected Not Detected  Not Detected    HYDROMORPHONE  Not Detected  Not Detected Not Detected  Not Detected    BUPRENORPHINE  Not Detected  Not Detected Not Detected  Not Detected    NORUBPRENORPHINE  Not Detected  Not Detected Not Detected  Not Detected    FENTANYL  Not Detected  Not Detected Not Detected  Not Detected    NORFENTANYL  Not Detected  Not Detected Not Detected  Not Detected    MEPERIDINE METABOLITE  Not Detected  Not Detected Not Detected  Not Detected    TAPENTADOL  Not Detected  Not Detected Not Detected  Not Detected    TAPENTADOL-O-SULF  Not Detected  Not Detected Not Detected  Not Detected    METHADONE  Not Detected  Not Detected Not Detected  Not Detected    TRAMADOL  Not Detected  Not Detected Not Detected  Not Detected    AMPHETAMINE  Not Detected  Not Detected Not Detected  Not Detected    METHAMPHETAMINE  Not Detected  Not Detected Not Detected  Not Detected    MDMA- ECSTASY  Not Detected  Not Detected Not Detected  Not Detected    MDA  Not Detected  Not Detected Not Detected  Not Detected    MDEA- Kait  Not Detected  Not Detected Not Detected  Not Detected     METHYLPHENIDATE  Not Detected  Not Detected Not Detected  Not Detected    PHENTERMINE  Not Detected  Not Detected Not Detected  Not Detected    BENZOYLECGONINE  Not Detected  Not Detected Not Detected  Not Detected    ALPRAZOLAM  Not Detected  Not Detected Not Detected  Not Detected    ALPHA-OH-ALPRAZOLAM  Not Detected  Not Detected Not Detected  Not Detected    CLONAZEPAM  Not Detected  Not Detected Not Detected  Not Detected    7-AMINOCLONAZEPAM  Not Detected  Not Detected Not Detected  Not Detected    DIAZEPAM  Not Detected  Not Detected Not Detected  Not Detected    NORDIAZEPAM  Not Detected  Not Detected Not Detected  Not Detected    OXAZEPAM  Not Detected  Not Detected Not Detected  Not Detected    TEMAZEPAM  Not Detected  Not Detected Not Detected  Not Detected    Lorazepam  Not Detected  Not Detected Not Detected  Not Detected    MIDAZOLAM  Not Detected  Not Detected Not Detected  Not Detected    ZOLPIDEM  Not Detected  Not Detected Not Detected  Not Detected    BARBITURATES  Not Detected  Not Detected Not Detected  Not Detected    Creatinine, Urine 20.0 - 400.0 mg/dL 61.1 66.0 R 112.3 93.0 86.0 R, CM 66.1 113.0 R, CM   ETHYL GLUCURONIDE  Not Detected  Not Detected Not Detected  Not Detected    MARIJUANA METABOLITE  Not Detected  Not Detected Not Detected  Not Detected    Comment: INTERPRETIVE INFORMATION: Marijuana Metabolite  The cutoff for Marijuana Metabolite (immunoassay) was  changed from 20 ng/mL to 50 ng/mL,  effective May 15, 2023.   PCP  Not Detected  Not Detected Not Detected  Not Detected    CARISOPRODOL  Not Detected  Not Detected CM Not Detected CM  Not Detected CM    Comment: The carisoprodol immunoassay has cross-reactivity to  carisoprodol and meprobamate.   Naloxone  Not Detected  Not Detected Not Detected      Gabapentin  Not Detected  Not Detected Not Detected      Pregabalin  Not Detected  Not Detected Not Detected      Alpha-OH-Midazolam  Not Detected  Not Detected Not Detected       Zolpidem Metabolite  Not Detected  Not Detected Not Detected      PAIN MANAGEMENT DRUG PANEL  See Below  See Below CM See Below CM  See Below CM

## 2024-02-12 NOTE — DISCHARGE INSTRUCTIONS

## 2024-02-12 NOTE — TELEPHONE ENCOUNTER
----- Message from Israel Hurtado MD sent at 2/12/2024 10:20 AM CST -----  Needs her refills now [facility]. Please send me

## 2024-02-12 NOTE — TELEPHONE ENCOUNTER
"----- Message from Maggie Koo sent at 2/12/2024 11:27 AM CST -----  Regarding: Refill  "Type:  Patient Call Back    Who Called:PT    What is the reqeust in detail:Requesting call back f/u on refill request. Please advise    Can the clinic reply by [facility]?no    Best Call Back Number:[phone]      Additional Information:Pt would like a call back today to know what's going on            "

## 2024-02-14 ENCOUNTER — PATIENT MESSAGE (OUTPATIENT)
Dept: BARIATRICS | Facility: CLINIC | Age: 59
End: 2024-02-14
Payer: MEDICARE

## 2024-02-14 ENCOUNTER — TELEPHONE (OUTPATIENT)
Dept: ELECTROPHYSIOLOGY | Facility: CLINIC | Age: 59
End: 2024-02-14
Payer: MEDICARE

## 2024-02-14 ENCOUNTER — TELEPHONE (OUTPATIENT)
Dept: NEUROLOGY | Facility: HOSPITAL | Age: 59
End: 2024-02-14
Payer: MEDICARE

## 2024-02-20 ENCOUNTER — PATIENT MESSAGE (OUTPATIENT)
Dept: BARIATRICS | Facility: CLINIC | Age: 59
End: 2024-02-20
Payer: MEDICARE

## 2024-02-20 ENCOUNTER — OFFICE VISIT (OUTPATIENT)
Dept: OPTOMETRY | Facility: CLINIC | Age: 59
End: 2024-02-20
Payer: MEDICARE

## 2024-02-20 DIAGNOSIS — Z96.1 PSEUDOPHAKIA OF BOTH EYES: ICD-10-CM

## 2024-02-20 DIAGNOSIS — E11.9 TYPE 2 DIABETES MELLITUS WITHOUT RETINOPATHY: Primary | ICD-10-CM

## 2024-02-20 PROCEDURE — 92014 COMPRE OPH EXAM EST PT 1/>: CPT | Mod: S$PBB,,, | Performed by: OPTOMETRIST

## 2024-02-20 PROCEDURE — 99999 PR PBB SHADOW E&M-EST. PATIENT-LVL III: CPT | Mod: PBBFAC,,, | Performed by: OPTOMETRIST

## 2024-02-20 PROCEDURE — 99213 OFFICE O/P EST LOW 20 MIN: CPT | Mod: PBBFAC,PO | Performed by: OPTOMETRIST

## 2024-02-20 NOTE — PROGRESS NOTES
HPI    FRANCY: 3/14/2023 Dr. Dodson  Chief complaint (CC): Pt presents today for diabetic eye exam. Pt reports   no vision complaints. Wear OTC readers for near. Pt reports floaters   OU--no flashes.  Glasses? OTC readers  Contacts? -  H/o eye surgery, injections or laser: Cataract OU  H/o eye injury: -  Known eye conditions? See above  Family h/o eye conditions? -  Eye gtts? -      (-) Flashes (+)  Floaters, OU (-) Mucous   (-)  Tearing (-) Itching (-) Burning   (-) Headaches (-) Eye Pain/discomfort (-) Irritation   (-)  Redness (-) Double vision (-) Blurry vision    Diabetic? +  A1c? Hemoglobin A1C       Date                     Value               Ref Range             Status                01/05/2024               6.2 (H)             4.0 - 5.6 %           Final                  09/18/2023               6.3 (H)             4.0 - 5.6 %           Final                   03/13/2023               6.3 (H)             4.0 - 5.6 %           Final                 Last edited by Nanci Carpenter MA on 2/20/2024 10:12 AM.            Assessment /Plan     For exam results, see Encounter Report.    Type 2 diabetes mellitus without retinopathy - Both Eyes    Pseudophakia of both eyes      No diabetic retinopathy, no csme. Return in 1 year for dilated eye exam.  2. Monitor condition. Patient to report any changes. RTC 1 year recheck.

## 2024-02-23 ENCOUNTER — TELEPHONE (OUTPATIENT)
Dept: ELECTROPHYSIOLOGY | Facility: CLINIC | Age: 59
End: 2024-02-23
Payer: MEDICARE

## 2024-02-26 ENCOUNTER — OFFICE VISIT (OUTPATIENT)
Dept: PODIATRY | Facility: CLINIC | Age: 59
End: 2024-02-26
Payer: MEDICARE

## 2024-02-26 ENCOUNTER — PATIENT MESSAGE (OUTPATIENT)
Dept: BARIATRICS | Facility: CLINIC | Age: 59
End: 2024-02-26
Payer: MEDICARE

## 2024-02-26 ENCOUNTER — TELEPHONE (OUTPATIENT)
Dept: ELECTROPHYSIOLOGY | Facility: CLINIC | Age: 59
End: 2024-02-26
Payer: MEDICARE

## 2024-02-26 ENCOUNTER — TELEPHONE (OUTPATIENT)
Dept: NEUROLOGY | Facility: CLINIC | Age: 59
End: 2024-02-26
Payer: MEDICARE

## 2024-02-26 VITALS
HEART RATE: 61 BPM | HEIGHT: 65 IN | SYSTOLIC BLOOD PRESSURE: 121 MMHG | DIASTOLIC BLOOD PRESSURE: 77 MMHG | BODY MASS INDEX: 39.99 KG/M2 | WEIGHT: 240 LBS

## 2024-02-26 DIAGNOSIS — Z79.899 LONG-TERM USE OF HIGH-RISK MEDICATION: Primary | ICD-10-CM

## 2024-02-26 DIAGNOSIS — B35.1 DERMATOPHYTOSIS OF NAIL: ICD-10-CM

## 2024-02-26 DIAGNOSIS — E11.49 TYPE II DIABETES MELLITUS WITH NEUROLOGICAL MANIFESTATIONS: Primary | ICD-10-CM

## 2024-02-26 DIAGNOSIS — R79.9 ABNORMAL FINDING OF BLOOD CHEMISTRY, UNSPECIFIED: ICD-10-CM

## 2024-02-26 DIAGNOSIS — E11.9 ENCOUNTER FOR DIABETIC FOOT EXAM: ICD-10-CM

## 2024-02-26 DIAGNOSIS — Z79.899 POLYPHARMACY: ICD-10-CM

## 2024-02-26 DIAGNOSIS — L84 CORNS AND CALLUS: ICD-10-CM

## 2024-02-26 PROCEDURE — 11721 DEBRIDE NAIL 6 OR MORE: CPT | Mod: 59,Q9,S$PBB, | Performed by: PODIATRIST

## 2024-02-26 PROCEDURE — 11057 PARNG/CUTG B9 HYPRKR LES >4: CPT | Performed by: PODIATRIST

## 2024-02-26 PROCEDURE — 99214 OFFICE O/P EST MOD 30 MIN: CPT | Mod: PBBFAC,PN | Performed by: PODIATRIST

## 2024-02-26 PROCEDURE — 99999 PR PBB SHADOW E&M-EST. PATIENT-LVL IV: CPT | Mod: PBBFAC,,, | Performed by: PODIATRIST

## 2024-02-26 PROCEDURE — 99499 UNLISTED E&M SERVICE: CPT | Mod: S$PBB,,, | Performed by: PODIATRIST

## 2024-02-26 NOTE — PROGRESS NOTES
Chief Complaint   Patient presents with    Diabetic Foot Exam     Foot Exam/PCP Clarisse Richardson MD  03/19/24           HPI:   The patient is a 58 y.o.  female  who presents for a diabetic foot exam/care   Patient reports + presence of abnormal sensation to the feet  Patient has no history of wounds on the feet.   Hx of foot surgery: none.     She has calluses on the bottom of the left foot that is painful.  She is using urea.   This patient has documented high risk feet requiring routine maintenance secondary to diabetes.          Primary care doctor is: Clarisse Richardson MD  Patient last saw primary care doctor on:  Diabetic Foot Exam (Foot Exam/PCP Clarisse Richardson MD  03/19/24)           Patient Active Problem List   Diagnosis    Morbid obesity    PADDY (obstructive sleep apnea)    Type 2 diabetes mellitus without complication, without long-term current use of insulin    Essential hypertension    Nuclear sclerosis - Both Eyes    Hyperlipemia    Proteinuria    Type 2 diabetes mellitus without retinopathy - Both Eyes    Bilateral knee pain    DJD (degenerative joint disease) of knee    Debility    Prosthetic joint implant failure    Failed total knee arthroplasty    Right knee pain    Knee pain    History of total left knee replacement    Lumbago    CTS (carpal tunnel syndrome)    Right carpal tunnel syndrome    Chronic, continuous use of opioids    Mild intermittent asthma without complication    Left arm pain    Left shoulder pain    Allergic reaction to food    DDD (degenerative disc disease), cervical    Cervical radiculopathy    Cervical spondylosis    Cubital tunnel syndrome on right    Bilateral shoulder bursitis    Cervical stenosis of spinal canal    DDD (degenerative disc disease), thoracic    Thoracic spondylosis    Spondylolisthesis at L4-L5 level    Lumbar spondylosis    Spondylolisthesis of cervical region    Cervical spine instability    Myeloradiculopathy    Neural foraminal stenosis  of cervical spine    DDD (degenerative disc disease), lumbar    Idiopathic scoliosis of thoracolumbar spine    Bilateral shoulder region arthritis    Impingement syndrome of right shoulder    Trochanteric bursitis of both hips    Chronic pain    Depression    Recurrent major depressive disorder, in partial remission    GERD (gastroesophageal reflux disease)    Trigger finger    Dyspnea    BMI 45.0-49.9, adult    Sacroiliac joint pain    Spondylosis without myelopathy    Subacromial bursitis of left shoulder joint    Sacroiliitis    Snoring    BMI 39.0-39.9,adult    S/P panniculectomy    Gout    History of sleeve gastrectomy    History of total right knee replacement    Noncompliance with CPAP treatment    Osteoarthritis of lumbar spine    Aortic atherosclerosis    Uterine fibroid    Nuclear sclerotic cataract of left eye    Nuclear sclerotic cataract of right eye    Impaired range of motion of both knees    Decreased strength of lower extremity    Impaired functional mobility, balance, gait, and endurance    Received tissue plasminogen activator (t-PA) less than 24 hours prior to arrival    Acute right MCA stroke    Type II diabetes mellitus with neurological manifestations           Current Outpatient Medications on File Prior to Visit   Medication Sig Dispense Refill    albuterol (VENTOLIN HFA) 90 mcg/actuation inhaler INHALE TWO PUFFS INTO LUNGS EVERY 4 TO 6 HOURS AS NEEDED FOR SHORTNESS OF BREATH AND FOR WHEEZING 18 g 1    allopurinoL (ZYLOPRIM) 300 MG tablet Take 1 tablet (300 mg total) by mouth 2 (two) times daily. 180 tablet 3    apixaban (ELIQUIS) 5 mg Tab Take 1 tablet (5 mg total) by mouth 2 (two) times daily. 60 tablet 6    atorvastatin (LIPITOR) 80 MG tablet Take 1 tablet (80 mg total) by mouth once daily. 90 tablet 3    b complex vitamins tablet Take 1 tablet by mouth every other day.       calcium citrate/vitamin D2 (ISIAH-CITRATE ORAL) Take 500 mg by mouth 2 (two) times daily.      colchicine, gout,  (MITIGARE) 0.6 mg Cap TAKE 1 CAPSULE (0.6 MG TOTAL) BY MOUTH DAILY AS NEEDED (FLARE UP). 90 capsule 1    cyanocobalamin (VITAMIN B-12) 1000 MCG tablet Take 100 mcg by mouth every morning.      diclofenac sodium (VOLTAREN) 1 % Gel Apply 2 g topically 4 (four) times daily as needed. To painful area on the feet. 500 g 5    FLUoxetine (PROZAC) 20 mg/5 mL (4 mg/mL) solution TAKE 5 MLS BY MOUTH ONCE DAILY. 450 mL 3    fluticasone propionate (FLONASE) 50 mcg/actuation nasal spray 1 SPRAY BY EACH NOSTRIL ROUTE 2 TIMES DAILY AS NEEDED FOR RHINITIS. 48 g 3    LIDOcaine (XYLOCAINE) 5 % Oint ointment APPLY TOPICALLY AS NEEDED. 35.44 g 6    MOUNJARO 2.5 mg/0.5 mL PnIj INJECT 2.5 MG INTO THE SKIN EVERY 7 DAYS. 12 Pen 1    MULTIVIT-IRON-MIN-FOLIC ACID 3,500-18-0.4 UNIT-MG-MG ORAL CHEW Take 1 tablet by mouth 2 (two) times daily.       nystatin (MYCOSTATIN) powder Apply topically 2 (two) times daily. 60 g 3    omeprazole (PRILOSEC) 20 MG capsule TAKE 1 CAPSULE (20 MG TOTAL) BY MOUTH EVERY MORNING. 90 capsule 2    oxybutynin (DITROPAN-XL) 5 MG TR24 TAKE 1 TABLET BY MOUTH ONCE DAILY. 90 tablet 3    oxyCODONE-acetaminophen (PERCOCET)  mg per tablet Take 1 tablet by mouth every 8 (eight) hours as needed for Pain. 90 tablet 0    tiZANidine (ZANAFLEX) 4 MG tablet Take 1 tablet (4 mg total) by mouth every 8 (eight) hours as needed (muscle pain). 90 tablet 2    tretinoin microspheres (RETIN-A MICRO PUMP) 0.08 % GlwP Apply to affected area daily 50 g 0    urea (CARMOL) 40 % Crea Apply topically once daily. To dry skin on the feet. 120 g 5    vitamin D 185 MG Tab Take 5,000 mg by mouth every morning.        Current Facility-Administered Medications on File Prior to Visit   Medication Dose Route Frequency Provider Last Rate Last Admin    0.9%  NaCl infusion   Intravenous Continuous Lina Wagner MD 25 mL/hr at 12/15/20 1506 25 mL/hr at 12/15/20 1506    0.9%  NaCl infusion   Intravenous Continuous SheldonCiro MD      "      Review of patient's allergies indicates:  No Known Allergies                  ROS:  General ROS: negative  Respiratory ROS: no cough, shortness of breath, or wheezing  Cardiovascular ROS: no chest pain or dyspnea on exertion  Musculoskeletal ROS: negative  Neurological ROS:   negative for - gait disturbance, + numbness/tingling, seizures or tremors  Dermatological ROS: + nail changes, dry skin          LAST HbA1c:   Hemoglobin A1C   Date Value Ref Range Status   01/05/2024 6.2 (H) 4.0 - 5.6 % Final     Comment:     ADA Screening Guidelines:  5.7-6.4%  Consistent with prediabetes  >or=6.5%  Consistent with diabetes    High levels of fetal hemoglobin interfere with the HbA1C  assay. Heterozygous hemoglobin variants (HbS, HgC, etc)do  not significantly interfere with this assay.   However, presence of multiple variants may affect accuracy.     09/18/2023 6.3 (H) 4.0 - 5.6 % Final     Comment:     ADA Screening Guidelines:  5.7-6.4%  Consistent with prediabetes  >or=6.5%  Consistent with diabetes    High levels of fetal hemoglobin interfere with the HbA1C  assay. Heterozygous hemoglobin variants (HbS, HgC, etc)do  not significantly interfere with this assay.   However, presence of multiple variants may affect accuracy.     03/13/2023 6.3 (H) 4.0 - 5.6 % Final     Comment:     ADA Screening Guidelines:  5.7-6.4%  Consistent with prediabetes  >or=6.5%  Consistent with diabetes    High levels of fetal hemoglobin interfere with the HbA1C  assay. Heterozygous hemoglobin variants (HbS, HgC, etc)do  not significantly interfere with this assay.   However, presence of multiple variants may affect accuracy.           EXAM:   Vitals:    02/26/24 0921   BP: 121/77   Pulse: 61   Weight: 108.9 kg (240 lb)   Height: 5' 5" (1.651 m)         General: Pt. is well-developed, well-nourished, appears stated age, in no acute distress, alert and oriented x 3.      Vascular: Dorsalis pedis and posterior tibial pulses are 2/4 bilaterally. " 3 secs capillary refill time and toes are warm to touch.    There is reduced hair growth on the feet.    There is 1+ edema.  no varicosities.        Neurological:  Light touch, proprioception, and sharp/dull sensation are all intact bilaterally. Protective threshold with the Alda-Lindsay monofilament is diminished bilaterally.   +paresthesias      Dermatological:  The skin is thin    The toenails  1-5 L and 1-5 R  are greenish- yellow, long by 5-6mm and thickened by 2-3mm with subungual debris  .   There are no open wounds. There is no ecchymoses.  no erythema noted.   Skin diffusely dry and xerotic on the soles, worse on the left.   Multiple nucleated     Interdigital spaces are intact, no fissures    Skin atrophic, thin, dry and mildly hyperpigmented.         Musculoskeletal:    Muscle strength is 5/5 in all groups bilaterally.    Metatarsophalangeal, subtalar, and ankle range of motion are intact without crepitus.     Ankle equinus bilateral         Assessment / Plan:      ICD-10-CM ICD-9-CM    1. Type II diabetes mellitus with neurological manifestations  E11.49 250.60 Routine Foot Care      2. Encounter for diabetic foot exam  E11.9 250.00 Routine Foot Care      3. Corns and callus  L84 700 Routine Foot Care      4. Dermatophytosis of nail  B35.1 110.1 Routine Foot Care                I counseled the patient on the patient's conditions, their implications and medical management.  Shoe inspection.     Continue good nutrition and blood sugar control to help prevent podiatric complications of diabetes.   Maintain proper foot hygiene.   Continue wearing proper shoe gear, daily foot inspections, never walking without protective shoe gear, never putting sharp instruments to feet.  Shoe and activity modification as needed for relief.   Continue urea cream daily. Socks to keep moisturize while sleeping.       Routine Foot Care    Date/Time: 2/26/2024 8:30 AM    Performed by: Stacey Rowland DPM  Authorized by:  "Stacey Rowland, DASHAWN    Time out: Immediately prior to procedure a "time out" was called to verify the correct patient, procedure, equipment, support staff and site/side marked as required.    Hyperkeratotic Skin Lesions?: Yes    Number of trimmed lesions:  6  Location(s):  Left Heel, Left Midfoot, Right Plantar, Left Plantar, Left 5th Metatarsal Head and Left 4th Metatarsal Head    Nail Care Type:  Debride  Location(s): All  (Left 1st Toe, Left 3rd Toe, Left 2nd Toe, Left 4th Toe, Left 5th Toe, Right 1st Toe, Right 2nd Toe, Right 3rd Toe, Right 4th Toe and Right 5th Toe)  Patient tolerance:  Patient tolerated the procedure well with no immediate complications     With patient's permission, the toenails mentioned above were reduced and debrided using a nail nipper, removing offending nail and debris.   Utilizing a #15 scalpel, I trimmed the corns and calluses at the above mentioned location.      The patient will continue to monitor the areas daily, inspect the feet, wear protective shoe gear when ambulatory, and moisturizer to maintain skin integrity.     "

## 2024-02-26 NOTE — TELEPHONE ENCOUNTER
----- Message from Heriberto Saldana MA sent at 2/26/2024 10:56 AM CST -----  Regarding: FW: Cancelled appt    ----- Message -----  From: Ragini Loza  Sent: 2/26/2024  10:41 AM CST  To: [facility] Staff  Subject: Cancelled appt                                   Pt [phone] would like a call in ref to her appt that was scheduled for today and cancelled that she knew nothing about until she called the clinic from the parking lot to see exactly where to go for her visit.    Thanks

## 2024-02-26 NOTE — PATIENT INSTRUCTIONS
How to Check Your Feet    Below are tips to help you look for foot problems. Try to check your feet at the same time each day, such as when you get out of bed in the morning.    Check the top of each foot. The tops of toes, back of the heel, and outer edge of the foot can get a lot of rubbing from poor-fitting shoes.    Check the bottom of each foot. Daily wear and tear often leads to problems at pressure spots.    Check the toes and nails. Fungal infections often occur between toes. Toenail problems can also be a sign of fungal infections or lead to breaks in the skin.    Check your shoes, too. Loose objects inside a shoe can injure the foot. Use your hand to feel inside your shoes for things like alie, loose stitching, or rough areas that could irritate your skin.        Diabetic Foot Care    Diabetes can lead to a number of different foot complications. Fortunately, most of these complications can be prevented with a little extra foot care. If diabetes is not well controlled, the high blood sugar can cause damage to blood vessels and result in poor circulation to the foot. When the skin does not get enough blood flow, it becomes prone to pressure sores and ulcers, which heal slowly.  High blood sugar can also damage nerves, interfering with the ability to feel pain and pressure. When you cant feel your foot normally, it is easy to injure your skin, bones and joints without knowing it. For these reasons diabetes increases the risk of fungal infections, bunions and ulcers. Deep ulcers can lead to bone infection. Gangrene is the most serious foot complication of diabetes. It usually occurs on the tips of the toes as blacked areas of skin. The black area is dead tissue. In severe cases, gangrene spreads to involve the entire toe, other toes and the entire foot. Foot or toe amputation may be required. Good foot care and blood sugar control can prevent this.    Home Care  Wear comfortable, proper fitting  shoes.  Wash your feet daily with warm water and mild soap.  After drying, apply a moisturizing cream or lotion.  Check your feet daily for skin breaks, blisters, swelling, or redness. Look between your toes also.  Wear cotton socks and change them every day.  Trim toe nails carefully and do not cut your cuticles.  Strive to keep your blood sugar under control with a combination of medicines, diet and activity.  If you smoke and have diabetes, it is very important that you stop. Smoking reduces blood flow to your foot.  Avoid activities that increase your risk of foot injury:  Do not walk barefoot.  Do not use heating pads or hot water bottles on your feet.  Do not put your foot in a hot tub without first checking the temperature with your hand.  10) Schedule yearly foot exams.    Follow Up  with your doctor or as advised by our staff. Report any cut, puncture, scrape, other injury, blister, ingrown toenail or ulcer on your foot.    Get Prompt Medical Attention  if any of the following occur:  -- Open ulcer with pus draining from the wound  -- Increasing foot or leg pain  -- New areas of redness or swelling or tender areas of the foot    © 2991-9992 [employer]. [address] [place] [address], [place] [address]. All rights reserved. This information is not intended as a substitute for professional medical care. Always follow your healthcare professional's instructions.

## 2024-02-29 ENCOUNTER — PATIENT MESSAGE (OUTPATIENT)
Dept: BARIATRICS | Facility: CLINIC | Age: 59
End: 2024-02-29
Payer: MEDICARE

## 2024-03-06 ENCOUNTER — PATIENT MESSAGE (OUTPATIENT)
Dept: BARIATRICS | Facility: CLINIC | Age: 59
End: 2024-03-06
Payer: MEDICARE

## 2024-03-06 DIAGNOSIS — M47.816 SPONDYLOSIS OF LUMBAR REGION WITHOUT MYELOPATHY OR RADICULOPATHY: ICD-10-CM

## 2024-03-06 DIAGNOSIS — M25.561 CHRONIC PAIN OF BOTH KNEES: ICD-10-CM

## 2024-03-06 DIAGNOSIS — M51.36 DDD (DEGENERATIVE DISC DISEASE), LUMBAR: ICD-10-CM

## 2024-03-06 DIAGNOSIS — M25.562 CHRONIC PAIN OF BOTH KNEES: ICD-10-CM

## 2024-03-06 DIAGNOSIS — G89.4 CHRONIC PAIN SYNDROME: ICD-10-CM

## 2024-03-06 DIAGNOSIS — G89.29 CHRONIC PAIN OF BOTH KNEES: ICD-10-CM

## 2024-03-06 DIAGNOSIS — M17.0 PRIMARY OSTEOARTHRITIS OF BOTH KNEES: ICD-10-CM

## 2024-03-07 RX ORDER — OXYCODONE AND ACETAMINOPHEN 10; 325 MG/1; MG/1
1 TABLET ORAL EVERY 8 HOURS PRN
Qty: 90 TABLET | Refills: 0 | Status: SHIPPED | OUTPATIENT
Start: 2024-03-07 | End: 2024-04-02 | Stop reason: SDUPTHER

## 2024-03-07 NOTE — TELEPHONE ENCOUNTER
Patient requesting refill on oxycodone  Last office visit 1/9/2024   shows last refill on [id number]  [facility]  Patient last UDS  8/14/2023 was consistent with current therapy     CODEINE  Not Detected  Not Detected Not Detected  Not Detected    MORPHINE  Not Detected  Not Detected Not Detected  Not Detected    6-ACETYLMORPHINE  Not Detected  Not Detected Not Detected  Not Detected    OXYCODONE  Present  Present Present  Not Detected    NOROYXCODONE  Present  Present Present  Present    OXYMORPHONE  Present  Present Present  Not Detected    NOROXYMORPHONE  Not Detected  Not Detected Not Detected  Not Detected    HYDROCODONE  Not Detected  Not Detected Not Detected  Not Detected    NORHYDROCODONE  Not Detected  Not Detected Not Detected  Not Detected    HYDROMORPHONE  Not Detected  Not Detected Not Detected  Not Detected    BUPRENORPHINE  Not Detected  Not Detected Not Detected  Not Detected    NORUBPRENORPHINE  Not Detected  Not Detected Not Detected  Not Detected    FENTANYL  Not Detected  Not Detected Not Detected  Not Detected    NORFENTANYL  Not Detected  Not Detected Not Detected  Not Detected    MEPERIDINE METABOLITE  Not Detected  Not Detected Not Detected  Not Detected    TAPENTADOL  Not Detected  Not Detected Not Detected  Not Detected    TAPENTADOL-O-SULF  Not Detected  Not Detected Not Detected  Not Detected    METHADONE  Not Detected  Not Detected Not Detected  Not Detected    TRAMADOL  Not Detected  Not Detected Not Detected  Not Detected    AMPHETAMINE  Not Detected  Not Detected Not Detected  Not Detected    METHAMPHETAMINE  Not Detected  Not Detected Not Detected  Not Detected    MDMA- ECSTASY  Not Detected  Not Detected Not Detected  Not Detected    MDA  Not Detected  Not Detected Not Detected  Not Detected    MDEA- Kait  Not Detected  Not Detected Not Detected  Not Detected    METHYLPHENIDATE  Not Detected  Not  Detected Not Detected  Not Detected    PHENTERMINE  Not Detected  Not Detected Not Detected  Not Detected    BENZOYLECGONINE  Not Detected  Not Detected Not Detected  Not Detected    ALPRAZOLAM  Not Detected  Not Detected Not Detected  Not Detected    ALPHA-OH-ALPRAZOLAM  Not Detected  Not Detected Not Detected  Not Detected    CLONAZEPAM  Not Detected  Not Detected Not Detected  Not Detected    7-AMINOCLONAZEPAM  Not Detected  Not Detected Not Detected  Not Detected    DIAZEPAM  Not Detected  Not Detected Not Detected  Not Detected    NORDIAZEPAM  Not Detected  Not Detected Not Detected  Not Detected    OXAZEPAM  Not Detected  Not Detected Not Detected  Not Detected    TEMAZEPAM  Not Detected  Not Detected Not Detected  Not Detected    Lorazepam  Not Detected  Not Detected Not Detected  Not Detected    MIDAZOLAM  Not Detected  Not Detected Not Detected  Not Detected    ZOLPIDEM  Not Detected  Not Detected Not Detected  Not Detected    BARBITURATES  Not Detected  Not Detected Not Detected  Not Detected    Creatinine, Urine 20.0 - 400.0 mg/dL 61.1 66.0 R 112.3 93.0 86.0 R, CM 66.1 113.0 R, CM   ETHYL GLUCURONIDE  Not Detected  Not Detected Not Detected  Not Detected    MARIJUANA METABOLITE  Not Detected  Not Detected Not Detected  Not Detected    Comment: INTERPRETIVE INFORMATION: Marijuana Metabolite  The cutoff for Marijuana Metabolite (immunoassay) was  changed from 20 ng/mL to 50 ng/mL,  effective May 15, 2023.   PCP  Not Detected  Not Detected Not Detected  Not Detected    CARISOPRODOL  Not Detected  Not Detected CM Not Detected CM  Not Detected CM    Comment: The carisoprodol immunoassay has cross-reactivity to  carisoprodol and meprobamate.   Naloxone  Not Detected  Not Detected Not Detected      Gabapentin  Not Detected  Not Detected Not Detected      Pregabalin  Not Detected  Not Detected Not Detected      Alpha-OH-Midazolam  Not Detected  Not Detected Not Detected      Zolpidem Metabolite  Not Detected  Not  Detected Not Detected      PAIN MANAGEMENT DRUG PANEL  See Below  See Below CM See Below CM  See Below

## 2024-03-18 ENCOUNTER — CLINICAL SUPPORT (OUTPATIENT)
Dept: BARIATRICS | Facility: CLINIC | Age: 59
End: 2024-03-18
Payer: MEDICARE

## 2024-03-18 ENCOUNTER — OFFICE VISIT (OUTPATIENT)
Dept: BARIATRICS | Facility: CLINIC | Age: 59
End: 2024-03-18
Payer: MEDICARE

## 2024-03-18 VITALS
OXYGEN SATURATION: 100 % | HEIGHT: 65 IN | DIASTOLIC BLOOD PRESSURE: 78 MMHG | SYSTOLIC BLOOD PRESSURE: 120 MMHG | HEART RATE: 64 BPM | BODY MASS INDEX: 40.76 KG/M2 | WEIGHT: 244.94 LBS | TEMPERATURE: 98 F | WEIGHT: 244.94 LBS | BODY MASS INDEX: 40.81 KG/M2

## 2024-03-18 DIAGNOSIS — Z90.3 S/P GASTRIC SLEEVE PROCEDURE: ICD-10-CM

## 2024-03-18 DIAGNOSIS — K21.9 GASTROESOPHAGEAL REFLUX DISEASE, UNSPECIFIED WHETHER ESOPHAGITIS PRESENT: Chronic | ICD-10-CM

## 2024-03-18 DIAGNOSIS — Z71.3 DIETARY COUNSELING AND SURVEILLANCE: ICD-10-CM

## 2024-03-18 DIAGNOSIS — G47.33 OSA (OBSTRUCTIVE SLEEP APNEA): Chronic | ICD-10-CM

## 2024-03-18 DIAGNOSIS — R63.4 WEIGHT LOSS: Primary | ICD-10-CM

## 2024-03-18 DIAGNOSIS — E11.9 TYPE 2 DIABETES MELLITUS WITHOUT COMPLICATION, WITHOUT LONG-TERM CURRENT USE OF INSULIN: Primary | Chronic | ICD-10-CM

## 2024-03-18 DIAGNOSIS — I10 ESSENTIAL HYPERTENSION: ICD-10-CM

## 2024-03-18 DIAGNOSIS — F32.A DEPRESSION, UNSPECIFIED DEPRESSION TYPE: Chronic | ICD-10-CM

## 2024-03-18 DIAGNOSIS — E66.01 MORBID OBESITY WITH BMI OF 40.0-44.9, ADULT: ICD-10-CM

## 2024-03-18 PROCEDURE — 97802 MEDICAL NUTRITION INDIV IN: CPT | Mod: PBBFAC,59 | Performed by: DIETITIAN, REGISTERED

## 2024-03-18 PROCEDURE — 99999 PR PBB SHADOW E&M-EST. PATIENT-LVL I: CPT | Mod: PBBFAC,,, | Performed by: DIETITIAN, REGISTERED

## 2024-03-18 PROCEDURE — 99215 OFFICE O/P EST HI 40 MIN: CPT | Mod: PBBFAC,27 | Performed by: PHYSICIAN ASSISTANT

## 2024-03-18 PROCEDURE — 99999 PR PBB SHADOW E&M-EST. PATIENT-LVL V: CPT | Mod: PBBFAC,,, | Performed by: PHYSICIAN ASSISTANT

## 2024-03-18 PROCEDURE — 99213 OFFICE O/P EST LOW 20 MIN: CPT | Mod: S$PBB,,, | Performed by: PHYSICIAN ASSISTANT

## 2024-03-18 PROCEDURE — 99499 UNLISTED E&M SERVICE: CPT | Mod: S$PBB,,, | Performed by: DIETITIAN, REGISTERED

## 2024-03-18 PROCEDURE — 99211 OFF/OP EST MAY X REQ PHY/QHP: CPT | Mod: PBBFAC | Performed by: DIETITIAN, REGISTERED

## 2024-03-18 NOTE — PROGRESS NOTES
BARIATRIC POST-OPERATIVE FOLLOW UP:    HPI:  58 y.o.-year-old female presents for 5 year post op.    Pt states that she is doing well   Had surgery with dr norris   Has lots of excess skin on thighs    Denies: nausea, vomiting, abdominal pain, changes in bowel movement pattern, fever, chills, dysphagia, chest pain, and shortness of breath.        ROS:    Review of Systems   Constitutional:  Negative for activity change and fatigue.   Respiratory:  Negative for cough and shortness of breath.    Cardiovascular:  Negative for chest pain, palpitations and leg swelling.   Gastrointestinal:  Negative for abdominal pain, nausea and vomiting.   Endocrine: Negative for polydipsia, polyphagia and polyuria.   Genitourinary:  Negative for dysuria.   Musculoskeletal:  Negative for gait problem.   Skin:  Negative for rash.   Allergic/Immunologic: Negative for immunocompromised state.   Neurological:  Negative for dizziness, syncope and weakness.   Hematological:  Does not bruise/bleed easily.   Psychiatric/Behavioral:  Negative for behavioral problems.        EXERCISE:  Walking more     VITAMINS:  Tolerating well     MEDICATIONS/ALLERGIES:  Have been reviewed.    DIET:  Bariatric Regular Diet   Protein limited to gout   Fluids 64 Ozs    PHYSICAL EXAM:  GENERAL: 58 y.o.-year-old female in NAD, A&O x3  VITAL SIGNS:  BP: 120/78  CHEST: Clear to auscultation, regular effort.  HEART: Regular rate and rhythm. No murmurs, clicks, or gallops.  ABDOMEN: Bowel sounds present in all four quadrants. Soft, non-tender, non-distended. Well-healing surgical scars are clean, dry, and intact without signs of infection or bleeding.  EXTREMITIES: No clubbing, cyanosis, or edema.    Physical Exam  Vitals and nursing note reviewed.   Constitutional:       Appearance: She is well-developed. She is obese.   HENT:      Head: Normocephalic.      Nose: Nose normal.      Mouth/Throat:      Mouth: Mucous membranes are moist.   Eyes:      Extraocular  Movements: Extraocular movements intact.   Cardiovascular:      Rate and Rhythm: Normal rate and regular rhythm.      Heart sounds: Normal heart sounds.   Pulmonary:      Effort: Pulmonary effort is normal.      Breath sounds: Normal breath sounds.   Abdominal:      General: Bowel sounds are normal.      Palpations: Abdomen is soft.   Musculoskeletal:         General: Normal range of motion.      Cervical back: Normal range of motion.   Skin:     General: Skin is warm and dry.      Capillary Refill: Capillary refill takes less than 2 seconds.   Neurological:      Mental Status: She is alert and oriented to person, place, and time.   Psychiatric:         Mood and Affect: Mood normal.         ASSESSMENT:  - Morbid obesity s/p sleeve gastrectomy on 8/2019.  - Co-morbidities: dyslipidemia, GERD, and obstructive sleep apnea  - Weight loss 1011 lbs  50% EWL  - Exercise regimen  - Diet good    PLAN:  - Emphasized the importance of regular exercise and adherence to bariatric diet to achieve maximum weight loss.  - Encouraged patient to begin OR continue regular exercise.  - Follow-up with dietician to reinforce diet.  - Continue daily vitamins and medications.  - RTC in 12 months or sooner if needed.  - Call the office for any issues.  - Check by PCP and reviewed

## 2024-03-18 NOTE — PATIENT INSTRUCTIONS
"Spring Recipes:    Trent Shake:     Ingredients  ½ cup low fat cottage cheese  ¼ cup vanilla protein powder  1/8 tsp mint extract  2-3 packets of stevia or artificial sweetener of choice  5-10 ice cubes (more or less depending on how thick you would like it)  4 oz of water  2-3 drops of green food coloring OR a small handful of spinach to make it green    Put all ingredients in  and  until desired consistency.      "Unstuffed" Cabbage Rolls:    Ingredients:  1 1/2 to 2 pounds lean ground beef or turkey  1 large onion, chopped  1 clove garlic, minced  1 small cabbage, chopped  2 cans (14.5 ounces each) diced tomatoes  1 can (8 ounces) tomato sauce  ¼ - ½ cup water depending on desired consistency  1 teaspoon ground black pepper, salt to taste    Spray large skillet with nonstick cookspray. Heat pan on medium heat. Sauté the onions until tender, and then add the ground beef (or turkey) and cook until the meat is browned.    Add the garlic, cook an additional minute before adding the remaining ingredients. Cover, reduce the heat and simmer about 25 minutes (or until the cabbage is  fork tender)        Not so Gordon's Pie:    Ingredients  2 (or more) pounds of lean ground beef, or turkey  2 heads of cauliflower or 3 bags of frozen cauliflower, chopped  1 bag of frozen mixed veggies          (NO Corn, Peas or Potatoes!)  1-2 onions  1 egg  1 teaspoon each of basil, garlic powder, pepper, oregano and a little cayenne  4-5 sprays of I cant believe its not butter spray  4 ounces of fat free cream cheese (optional)  Low fat Cheese to top (optional)    Roast cauliflower in oven on 350* F for about 15-20 minutes, until browned and fork tender. (begin ramirez meat while cauliflower is cooking). Place cooked cauliflower in . Add 4 ounces of fat free cream cheese, 4-5 sprays of I cant believe its not butter SPRAY, and spices and puree until creamy.     While cauliflower is roasting, brown meat in " large skillet and season to taste. Set aside. Sauté diced onion in skillet until somewhat soft. Set aside with meat. Pour mixed veggies in the skillet to heat on low heat.     Mix the meat, onions, mixed veggies, raw egg and any additional seasonings and put in bottom of 9x13 baking dish. Spread mashed cauliflower mixture over it until smooth.    Bake at 350 for approximately 30 minutes. Add cheese and bake 5 additional minutes (optional). Serve warm (or reheat later).    Crock Pot Balsamic Pork Tenderloin:    Ingredients:  1 tsp dried thyme  1 tsp ground pepper  ½ tsp salt  3 tbsp dried minced onion  2 tsp dried basil  ¼ cup low sodium broth  ½ cup balsamic vinegar  2 cloves garlic, minced  2 lbs Pork Tenderloin    Mix first 5 ingredients together. Rub pork tenderloin with dry seasoning mixture.  In a large sauté pan, heat ¼ cup balsamic vinegar and garlic on medium heat. Add pork to sauté leal and sear, ramirez all sides. Add low sodium broth, 1 tbsp at a time to keep tenderloin from sticking or use nonstick cookspray.     Transfer meat to crock pot. Pour remaining balsamic vinegar into sauté pan and continue  to stir for a few minutes to deglaze the pan. Pour juices over the top of the tenderloin in crockpot. Cook on high for 3 hours or until meat thermometer says 170*. Let rest 5-10 minutes and then slice.       Side Dish: Steamed carrots w/ garlic and khanh    Ingredients:  2 garlic cloves, minced  1 lb baby carrots  5-6 sprays of I cant believe its not butter SPRAY  1 tsp minced peeled fresh khanh  1 tbsp chopped fresh cilantro  ½ tsp grated lime rind  1 tbsp fresh lime juice   ¼ tsp salt    Prepare garlic; let stand 10 minutes. Steam carrots, covered in pot, about 10 minutes or until tender.     In a nonstick skillet over medium heat, spray pan w/ I cant believe its not butter SPRAY. Add garlic and khanh and cook 1 minute, stirring constantly. Remove from  heat; stir in carrots, cilantro and remaining  ingredients.         Side Dish: Green Bean Almondine    Ingredients:  1 pound fresh green beans, trimmed  1 tbsp nita vinegar  1 ½ tsp water  1 tsp Scott Mustard  ¼ tsp salt  ¼ tsp freshly ground black pepper  1 tbsp sliced almonds, toasted    Cook green beans in boiling water for 4 minutes or until crisp-tender. Drain and plunge beans into ice water. Drain well. Pat beans dry w/ paper towel.     Combine vinegar, water, mustard, salt and pepper in a medium bowl. Add beans to vinegar mixture; toss to coat well. Sprinkle with toasted almonds.      How to Cook the Perfect Hard Boiled Egg:    Steam in water: Fill a large pot with 1 inch of water. Place steamer insert inside, cover, and bring to a boil over high heat. Add eggs to steamer basket, cover, and continue cooking 12 minute. If serving cold, immediately place eggs in a bowl of ice water and allow to cool for at least 15 minutes before peeling under cool running water. Store in the refrigerator for up to 5 days.    OR    Purchase Dash Go Rapid Egg Boiler: available @ [employer], [employer], [employer], [employer] for ~$15-$20      Classic Egg Salad Recipe:    Ingredients:  8 hard cooked eggs, peeled and coarsely chopped  4-6 tbsp of low fat or fat free bravo  2 tbsp celery, chopped  2 tsp scott mustard  Dash of cayenne pepper (for spice)  Black pepper, salt to taste    In a medium bowl, combine bravo, celery, mustard and cayenne pepper with chopped eggs. Season w/ pepper and salt. Serve over Lettuce leaves.     Get Creative! Add chopped turkey weiss and tomato and serve on lettuce leaves for an egg-cellent BLT egg salad or mix lean diced ham, low fat cheddar and tomatoes for  egg salad    Tuna Deviled Eggs:    Ingredients:  12 hard boiled eggs  1 can of tuna packed in water  1 rib celery, diced  ¼ cup low fat bravo  1 tsp mustard  Garlic powder, black pepper to taste  Dash of paprika (for finish)    Cut eggs in half lengthwise. Remove yolk and mash in bowl.  In separate bowl, mix tuna, celery, low fat bravo, mustard and seasonings.  Stir in egg yolks until blended. Stuff eggs and sprinkle dash of paprika      Weiss Jalapeno Deviled Eggs:    Ingredients:  12 hard boiled eggs, peeled  ½ cup low fat bravo  1 ½ tsp rice vinegar  ¾ tsp ground mustard  ½ packet splenda  2 jalapenos, seeded and chopped, 6 pieces turkey weiss, cooked crisp and crumbled  Dash of paprika (for finish)    Cut eggs in half lengthwise. Remove yolk and mash in bowl. Add bravo, vinegar, spices and Splenda until well combined. Mix in jalapenos and wiess. Stuff eggs and sprinkle w/ dash of paprika      Sugar-Free High Protein Brownie Recipe:    Ingredients:  2 cups of pureed zucchini  4 oz fat free cream cheese  1 large egg  2 scoops chocolate protein powder  1 tbsp pure vanilla extract  1/3 cup unsweetened cocoa powder    ¼ tsp salt  2 tbsp chopped nuts  *8-9 packets of splenda or artificial sweetener of choice    Preheat oven to 350* F.     Line an 8x8 pan w/ parchment paper or spray with nonstick cookspray.     In a medium bowl, blend the fat free cream cheese with egg until creamy. Add chocolate protein powder and cocoa powder until creamy. Add vanilla extract. Stir in pureed zucchini and salt.     The batter will be thick, but not dry. If batter seems dry, add 1-2 tbsp water. Fold in Nuts. Pour batter in prepared pan.     Bake for 30 minutes. Allow to cool completely. Cut into squares and chill to semi-set.     *best if eaten within 2 days but may last 3-4 days in fridge.         Lemon Whip:    Ingredients:  Small box of sugar-free vanilla instant pudding mix  1 small packet of crystal light lemonade mix  2 cups skim milk or fat free fairlife milk  Sugar-free, fat free cool whip     Make sugar-free pudding using 2 cups skim milk according to package. Stir in 1 small packet of crystal light lemonade mix.  Fold in a dollop of sugar-free, fat free cool whip and stir to combine.     Home-made Sugar-free  Pudding Pops:    Ingredients:  1 small pkg of sugar-free instant pudding mix (flavor of choice!)  2 cups fat free milk     Beat ingredients with whisk for 2 minutes. Pour into 6 paper or plastic cups or into a popsicle mold. Insert wooden pop stick in center of each cup.     Freeze for 5 hours or until firm. Peel off paper or pull out of popsicle mold.     Variations: add sugar-free syrups to change up the flavor, such as sugar-free chocolate pudding + sugar free raspberry syrup = chocolate raspberry fudgesicle.

## 2024-03-18 NOTE — PROGRESS NOTES
"NUTRITION NOTE    Referring Physician: Heriberto Clements M.D.  Reason for MNT Referral: Follow-up 4.5 years s/p Gastric Sleeve    PT reports having a stroke in January 2024. PT also reports being stressed from the passing of brother, father, and sister with new cancer diagnosis. PT states her goal was to get as close to 200 lbs as possible. She complains of "extra skin" in her legs.    PAST MEDICAL HISTORY:    Denies nausea, vomiting, constipation, and diarrhea.  Reports doing well.    Past Medical History:   Diagnosis Date    Acute right MCA stroke 1/4/2024    Allergy     Anemia     Asthma     Bilateral shoulder bursitis     Cervical stenosis of spine     Chronic pain     DDD (degenerative disc disease), cervical     Depression 01/28/2019    Diabetes mellitus type II     DJD (degenerative joint disease) of knee 06/19/2014    Facet arthritis of lumbar region 12/17/2015    Facet syndrome 12/17/2015    GERD (gastroesophageal reflux disease)     Heartburn     Hyperlipidemia     Hypertension     Morbid obesity     Neuromuscular disorder     Nuclear sclerotic cataract of left eye 8/8/2023    PADDY (obstructive sleep apnea)     Proteinuria     Right carpal tunnel syndrome     Sacroiliitis 06/13/2018    Sacroiliitis     Sleep apnea     Uterine fibroid        CLINICAL DATA:  58 y.o.-year-old Black or  female.    Current Weight: 244 lbs  BMI: 40.76  Total Weight Loss: 101 lbs  Excess Weight Loss: 50 %    CURRENT DIET:  Regular Diet.  Diet Recall: 80-85 grams of protein/day; 64 oz of fluids/day    Protein shake  B: Banana, coffee with Sweet & Low, Equal, cream, and a biscuit with weiss or smoked sausage, or grits  L: Fruit and protein like apple and cheese, banana and nuts  S: Salad (lettuce, cucumbers, pickled beets) with 6 boiled shrimp or turkey and Swiss cheese sandwich  D: 4-5 oz baked fish or rotisserie chicken with greens or a vegetable, a little rice  Sulma tea or marilu with Sweet & Low and " Equal    Meal Pattern: 3 meal(s) + 2 snack(s) + 1 protein supplement(s)  Adequate protein supplement intake. Fairlife Nutrition Plan  Adequate dairy intake.  Adequate vegetable intake.  Adequate fruit intake.  Starchy CHO: rice, grits, bread  Beverages: Coffee, water, Dana diet iced tea, khanh tea  Alcohol: Rarely    LABS:  Not available at time of visit    VITAMINS / MINERALS:  Multivitamin with 16 mg iron; taking 1 per day  Super B complex with 100 mg thiamine taking one per day  Caltrate Calcium 600 mg with vitamin D taken 1 per day  Vitamin B-12 sublingual taking 1 per day  Vit D  Vit C    EXERCISE:  Walks dog daily 4 blocks    Restrictions to Exercise:  Knee and back pain, knees have both been replaced    ASSESSMENT:  Doing well overall.  Weight gain in past year.  Adequate calorie intake.  Adequate protein intake.  Adequate fluid intake.  Following diet appropriately.  Not exercising.  Adequate vitamins & minerals.    BARIATRIC DIET DISCUSSION:  Instructed and provided written materials on bariatric diet plan.  Reinforced post-op nutrition guidelines.  Discussed adding weight training (resistance bands, dumb bells) and aerobic exercise. PT states she has an adult tricycle.    PLAN / RECOMMENDATIONS:  Continue diet plan.  Adjust diet by cutting starchy CHO if PT wishes to continue losing weight.  Maintain protein intake.  Maintain fluid intake.  Begin exercise.  Continue appropriate vitamins & minerals.  Adjust vitamins & minerals by increasing calcium 2 per day with food.    Return to clinic in 1 years.    SESSION TIME: 25 minutes

## 2024-03-19 ENCOUNTER — PATIENT MESSAGE (OUTPATIENT)
Dept: ADMINISTRATIVE | Facility: HOSPITAL | Age: 59
End: 2024-03-19
Payer: MEDICARE

## 2024-03-19 ENCOUNTER — OFFICE VISIT (OUTPATIENT)
Dept: INTERNAL MEDICINE | Facility: CLINIC | Age: 59
End: 2024-03-19
Payer: MEDICARE

## 2024-03-19 VITALS
TEMPERATURE: 99 F | WEIGHT: 243.81 LBS | SYSTOLIC BLOOD PRESSURE: 137 MMHG | HEART RATE: 61 BPM | HEIGHT: 65 IN | OXYGEN SATURATION: 98 % | DIASTOLIC BLOOD PRESSURE: 80 MMHG | RESPIRATION RATE: 20 BRPM | BODY MASS INDEX: 40.62 KG/M2

## 2024-03-19 DIAGNOSIS — G89.4 CHRONIC PAIN SYNDROME: Chronic | ICD-10-CM

## 2024-03-19 DIAGNOSIS — F32.A DEPRESSION, UNSPECIFIED DEPRESSION TYPE: Chronic | ICD-10-CM

## 2024-03-19 DIAGNOSIS — Z01.419 NORMAL GYNECOLOGIC EXAMINATION: ICD-10-CM

## 2024-03-19 DIAGNOSIS — E11.9 TYPE 2 DIABETES MELLITUS WITHOUT COMPLICATION, WITHOUT LONG-TERM CURRENT USE OF INSULIN: Chronic | ICD-10-CM

## 2024-03-19 DIAGNOSIS — E78.5 HYPERLIPIDEMIA, UNSPECIFIED HYPERLIPIDEMIA TYPE: Primary | ICD-10-CM

## 2024-03-19 DIAGNOSIS — M10.9 GOUT, UNSPECIFIED CAUSE, UNSPECIFIED CHRONICITY, UNSPECIFIED SITE: ICD-10-CM

## 2024-03-19 DIAGNOSIS — I48.0 PAROXYSMAL ATRIAL FIBRILLATION: ICD-10-CM

## 2024-03-19 DIAGNOSIS — I63.9 CEREBROVASCULAR ACCIDENT (CVA), UNSPECIFIED MECHANISM: ICD-10-CM

## 2024-03-19 DIAGNOSIS — N39.46 MIXED INCONTINENCE URGE AND STRESS (MALE)(FEMALE): ICD-10-CM

## 2024-03-19 DIAGNOSIS — K21.9 GASTROESOPHAGEAL REFLUX DISEASE WITHOUT ESOPHAGITIS: ICD-10-CM

## 2024-03-19 PROCEDURE — 99214 OFFICE O/P EST MOD 30 MIN: CPT | Mod: S$PBB,,, | Performed by: INTERNAL MEDICINE

## 2024-03-19 PROCEDURE — 99215 OFFICE O/P EST HI 40 MIN: CPT | Mod: PBBFAC,PO | Performed by: INTERNAL MEDICINE

## 2024-03-19 PROCEDURE — 99999 PR PBB SHADOW E&M-EST. PATIENT-LVL V: CPT | Mod: PBBFAC,,, | Performed by: INTERNAL MEDICINE

## 2024-03-19 RX ORDER — OMEPRAZOLE 20 MG/1
20 CAPSULE, DELAYED RELEASE ORAL EVERY MORNING
Qty: 90 CAPSULE | Refills: 2 | Status: SHIPPED | OUTPATIENT
Start: 2024-03-19 | End: 2024-04-10

## 2024-03-19 RX ORDER — ATORVASTATIN CALCIUM 80 MG/1
80 TABLET, FILM COATED ORAL DAILY
Qty: 90 TABLET | Refills: 3 | Status: SHIPPED | OUTPATIENT
Start: 2024-03-19 | End: 2025-03-19

## 2024-03-19 RX ORDER — OXYBUTYNIN CHLORIDE 5 MG/1
5 TABLET, EXTENDED RELEASE ORAL DAILY
Qty: 90 TABLET | Refills: 3 | Status: SHIPPED | OUTPATIENT
Start: 2024-03-19

## 2024-03-19 RX ORDER — TIRZEPATIDE 5 MG/.5ML
5 INJECTION, SOLUTION SUBCUTANEOUS
Qty: 4 PEN | Refills: 1 | Status: SHIPPED | OUTPATIENT
Start: 2024-03-19 | End: 2024-05-21

## 2024-03-19 RX ORDER — ALLOPURINOL 300 MG/1
300 TABLET ORAL 2 TIMES DAILY
Qty: 180 TABLET | Refills: 3 | Status: SHIPPED | OUTPATIENT
Start: 2024-03-19

## 2024-03-19 RX ORDER — FLUOXETINE HYDROCHLORIDE 40 MG/1
40 CAPSULE ORAL DAILY
Qty: 30 CAPSULE | Refills: 0 | Status: SHIPPED | OUTPATIENT
Start: 2024-03-19 | End: 2024-05-21 | Stop reason: SDUPTHER

## 2024-03-19 NOTE — PROGRESS NOTES
Subjective:       Patient ID: Alivia Thomas is a 58 y.o. female.    Chief Complaint: Follow-up    Follow-up  Pertinent negatives include no abdominal pain, chest pain (arm pain or jaw pain), headaches, nausea or vomiting.   Pt is feeling a bit more depressed.  She stopped most her meds except eliquis.  No CP or SOB. No new stroke symptoms.    Review of Systems   Respiratory:  Negative for shortness of breath (PND or orthopnea).    Cardiovascular:  Negative for chest pain (arm pain or jaw pain).   Gastrointestinal:  Negative for abdominal pain, diarrhea, nausea and vomiting.   Genitourinary:  Negative for dysuria.   Neurological:  Negative for seizures, syncope and headaches.       Objective:      Physical Exam  Constitutional:       General: She is not in acute distress.     Appearance: She is well-developed.   HENT:      Head: Normocephalic.   Eyes:      Pupils: Pupils are equal, round, and reactive to light.   Neck:      Thyroid: No thyromegaly.      Vascular: No JVD.   Cardiovascular:      Rate and Rhythm: Normal rate and regular rhythm.      Heart sounds: Normal heart sounds. No murmur heard.     No friction rub. No gallop.   Pulmonary:      Effort: Pulmonary effort is normal.      Breath sounds: Normal breath sounds. No wheezing or rales.   Abdominal:      General: Bowel sounds are normal. There is no distension.      Palpations: Abdomen is soft. There is no mass.      Tenderness: There is no abdominal tenderness. There is no guarding or rebound.   Musculoskeletal:      Cervical back: Neck supple.   Lymphadenopathy:      Cervical: No cervical adenopathy.   Skin:     General: Skin is warm and dry.   Neurological:      Mental Status: She is alert and oriented to person, place, and time.      Deep Tendon Reflexes: Reflexes are normal and symmetric.   Psychiatric:         Behavior: Behavior normal.         Thought Content: Thought content normal.         Judgment: Judgment normal.         Assessment:       1.  Hyperlipidemia, unspecified hyperlipidemia type    2. Gout, unspecified cause, unspecified chronicity, unspecified site    3. Gastroesophageal reflux disease without esophagitis    4. Mixed incontinence urge and stress (male)(female)    5. Type 2 diabetes mellitus without complication, without long-term current use of insulin    6. Normal gynecologic examination        Plan:   Hyperlipidemia, unspecified hyperlipidemia type  -     Lipid Panel; Future; Expected date: 03/19/2024  Restart atorvastatin  -     atorvastatin (LIPITOR) 80 MG tablet; Take 1 tablet (80 mg total) by mouth once daily.  Dispense: 90 tablet; Refill: 3  Gout, unspecified cause, unspecified chronicity, unspecified site  -    restart allopurinoL (ZYLOPRIM) 300 MG tablet; Take 1 tablet (300 mg total) by mouth 2 (two) times daily.  Dispense: 180 tablet; Refill: 3    Gastroesophageal reflux disease without esophagitis  -     omeprazole (PRILOSEC) 20 MG capsule; Take 1 capsule (20 mg total) by mouth every morning.  Dispense: 90 capsule; Refill: 2    Mixed incontinence urge and stress (male)(female)  -     oxybutynin (DITROPAN-XL) 5 MG TR24; Take 1 tablet (5 mg total) by mouth once daily.  Dispense: 90 tablet; Refill: 3    Type 2 diabetes mellitus without complication, without long-term current use of insulin  -     Hemoglobin A1C; Future; Expected date: 03/19/2024  -     Urinalysis; Future; Expected date: 03/19/2024  -     Microalbumin/Creatinine Ratio, Urine; Future; Expected date: 03/19/2024  Increase mounjaro to 5mg weekly   Pen; Refill: 1-     tirzepatide (MOUNJARO) 5 mg/0.5 mL PnIj; Inject 5 mg into the skin every 7 days.  Dispense: 4 pens  Try to continue to increase dose to effect more weight loss to help with her back and knee pain  Normal gynecologic examination  -     Ambulatory referral/consult to Gynecology; Future; Expected date: 03/26/2024    CVA due to atrial fibrillation  -     apixaban (ELIQUIS) 5 mg Tab; Take 1 tablet (5 mg total) by  mouth 2 (two) times daily.  Dispense: 60 tablet; Refill: 6    Depression  -     FLUoxetine 40 MG capsule; Take 1 capsule (40 mg total) by mouth once daily.  Dispense: 30 capsule; Refill: 0

## 2024-03-25 NOTE — PROGRESS NOTES
Electrophysiology Clinic Note    Reason for new patient visit: Evaluation and recommendations regarding paroxysmal atrial fibrillation.     PRESENTING HISTORY:     History of Present Illness:  Ms. Alivia Thomas is a rosario 58-year-old woman who presents today for evaluation and recommendations regarding paroxysmal atrial fibrillation. She has a past medical history significant for a prior right MCA stroke on 1/4/2024, with a new diagnosis of paroxysmal atrial fibrillation captured on an ambulatory event monitor, hypertension, hyperlipidemia, type II diabetes mellitus, mild intermittent asthma, chronic low back pain, osteoarthritis, GERD, gout, depression, seasonal allergies, PADDY, and a history of sleeve gastrectomy with obesity.    In review of her medical history, she suffered an acute right MCA stroke on 1/4/2024. At that event, she tells me that her wife noticed that she was having left-sided facial drooping, slurred speech, and she reported left arm numbness and weakness. She was brought into the ED and underwent tPA administration with resolution of her left-sided deficits. She reports continued symptoms of word searching and poor memory following her stroke, but has been able to resume her baseline level of physical activity. She was started on DAPT with aspirin and Plavix and a 30-day ambulatory event monitor was placed. On this monitor, events of paroxysmal atrial fibrillation were captured, and she was started on oral anticoagulation with apixaban 5mg po BID. She denies any adverse bleeding events while on this agent. Her DAPT was discontinued. She cannot tell when she is in atrial fibrillation, and denies any symptoms of overt palpitations. She is aware that her heart occasionally races, and reports that she may have periods of increased fatigue and slightly worsened shortness of breath, but she is unable to discern when she is in atrial fibrillation. She reports having increased stress in her personal  life, with the recent death of her brother and her sister being diagnosed recently with cancer.     In discussion with Ms. Thomas today, she tells me that she is feeling overall quite well. She denies any episodes of dizziness, lightheadedness, syncope/presyncope, laila palpitations, chest pain or chest discomfort, nausea or vomiting, orthopnea, or PND. She reports baseline shortness of breath and dyspnea with exertion that has remained stable. She can climb one flight of stairs prior to needing to take a break, but is limited by knee, hip, and back pain. She is followed in the Pain Clinic with Dr. Hurtado.     Review of Systems:  Review of Systems   Constitutional:  Negative for activity change.   HENT:  Negative for nasal congestion, nosebleeds, postnasal drip, rhinorrhea, sinus pressure/congestion, sneezing and sore throat.    Respiratory:  Positive for shortness of breath. Negative for apnea, cough, chest tightness and wheezing.    Cardiovascular:  Negative for chest pain, palpitations and leg swelling.   Gastrointestinal:  Positive for reflux. Negative for abdominal distention, abdominal pain, blood in stool, change in bowel habit, constipation, diarrhea, nausea and vomiting.   Genitourinary:  Negative for dysuria and hematuria.   Musculoskeletal:  Positive for arthralgias and back pain. Negative for gait problem.   Neurological:  Positive for speech difficulty. Negative for dizziness, seizures, syncope, weakness, light-headedness, headaches and coordination difficulties.        Continued word searching and reported poor memory following her stroke.    Psychiatric/Behavioral:  Positive for sleep disturbance.         PAST HISTORY:     Past Medical History:   Diagnosis Date    Acute right MCA stroke 1/4/2024    Allergy     Anemia     Asthma     Bilateral shoulder bursitis     Cervical stenosis of spine     Chronic pain     DDD (degenerative disc disease), cervical     Depression 01/28/2019    Diabetes mellitus type  II     DJD (degenerative joint disease) of knee 06/19/2014    Facet arthritis of lumbar region 12/17/2015    Facet syndrome 12/17/2015    GERD (gastroesophageal reflux disease)     Heartburn     Hyperlipidemia     Hypertension     Morbid obesity     Neuromuscular disorder     Nuclear sclerotic cataract of left eye 8/8/2023    PADDY (obstructive sleep apnea)     Paroxysmal atrial fibrillation 3/19/2024    Proteinuria     Right carpal tunnel syndrome     Sacroiliitis 06/13/2018    Sacroiliitis     Sleep apnea     Uterine fibroid        Past Surgical History:   Procedure Laterality Date    CARPAL TUNNEL RELEASE      CARPAL TUNNEL RELEASE  1980s    left    CARPAL TUNNEL RELEASE  2012    right    CATARACT EXTRACTION W/  INTRAOCULAR LENS IMPLANT Left 8/8/2023    Procedure: EXTRACTION, CATARACT, WITH IOL INSERTION;  Surgeon: Justin Block MD;  Location: [facility] OR;  Service: Ophthalmology;  Laterality: Left;    CATARACT EXTRACTION W/  INTRAOCULAR LENS IMPLANT Right 8/29/2023    Procedure: EXTRACTION, CATARACT, WITH IOL INSERTION;  Surgeon: Justin Block MD;  Location: [facility] OR;  Service: Ophthalmology;  Laterality: Right;    COLONOSCOPY N/A 6/15/2020    Procedure: COLONOSCOPY;  Surgeon: Jc Koo MD;  Location: [facility] (4TH FLR);  Service: Endoscopy;  Laterality: N/A;  COVID screening on 6/13/20 at [facility]  pt updated on drop off location and no visitor [facility]    EPIDURAL STEROID INJECTION N/A 7/24/2023    Procedure: INJECTION, STEROID, EPIDURAL, L5/S1 SOONER DATE;  Surgeon: Israel Hurtado MD;  Location: [facility] PAIN MGT;  Service: Pain Management;  Laterality: N/A;    ESOPHAGOGASTRODUODENOSCOPY N/A 3/27/2019    Procedure: EGD (ESOPHAGOGASTRODUODENOSCOPY);  Surgeon: Robbin Bar MD;  Location: [facility] (2ND FLR);  Service: Endoscopy;  Laterality: N/A;  BMI 61.3/2nd floor case/svn    INJECTION OF JOINT Bilateral 6/9/2020    Procedure: INJECTION, JOINT, BILATERAL SI;  Surgeon: Lina QUICK  MD Kristy;  Location: [facility] PAIN MGT;  Service: Pain Management;  Laterality: Bilateral;  B/L SI Joint Injection    INJECTION OF JOINT Bilateral 5/11/2021    Procedure: INJECTION, JOINT, SACROILIAC (SI) need consent;  Surgeon: Lina Wagner MD;  Location: [facility] PAIN MGT;  Service: Pain Management;  Laterality: Bilateral;    JOINT REPLACEMENT Bilateral     with 2 revisions on rt    KNEE SURGERY  3/2010    orthroscope    KNEE SURGERY  6-19-14    left TKR    LAPAROSCOPIC SLEEVE GASTRECTOMY N/A 8/28/2019    Procedure: GASTRECTOMY, SLEEVE, LAPAROSCOPIC with intraop EGD;  Surgeon: Heriberto Clements MD;  Location: [facility] OR 2ND FLR;  Service: General;  Laterality: N/A;    PANNICULECTOMY N/A 6/14/2022    Procedure: PANNICULECTOMY;  Surgeon: Rhett Gray MD;  Location: [facility] OR [facility] FLR;  Service: Plastics;  Laterality: N/A;    RADIOFREQUENCY ABLATION Right 7/9/2019    Procedure: RADIOFREQUENCY ABLATION;  Surgeon: Lina Wagner MD;  Location: [facility] PAIN MGT;  Service: Pain Management;  Laterality: Right;  RIGHT RFA L2,3,4,5  2 of 2    RADIOFREQUENCY ABLATION Left 12/19/2019    Procedure: RADIOFREQUENCY ABLATION, LEFT L2-L3-L4-L5 MEDIAL BRANCH 1 OF 2;  Surgeon: Lina Wagner MD;  Location: [facility] PAIN MGT;  Service: Pain Management;  Laterality: Left;    RADIOFREQUENCY ABLATION Right 12/31/2019    Procedure: RADIOFREQUENCY ABLATION, RIGHT L2-L3-L4-L5  2 OF 2;  Surgeon: Lina Wagner MD;  Location: [facility] PAIN MGT;  Service: Pain Management;  Laterality: Right;    RADIOFREQUENCY ABLATION Right 10/13/2020    Procedure: RADIOFREQUENCY ABLATION, Genicular Cooled 1 of 2;  Surgeon: Lina Wagner MD;  Location: [facility] PAIN MGT;  Service: Pain Management;  Laterality: Right;    RADIOFREQUENCY ABLATION Left 11/3/2020    Procedure: RADIOFREQUENCY ABLATION, GENICULAR COOLED  2 OF 2;  Surgeon: Lina Wagner MD;  Location: [facility] PAIN MGT;  Service: Pain Management;  Laterality: Left;    RADIOFREQUENCY  ABLATION Left 12/15/2020    Procedure: RADIOFREQUENCY ABLATION LEFT L2,3,4,5 1 of 2;  Surgeon: Lina Wagner MD;  Location: [facility] PAIN MGT;  Service: Pain Management;  Laterality: Left;    RADIOFREQUENCY ABLATION Right 12/29/2020    Procedure: RADIOFREQUENCY ABLATION RIGHT L2,3,4,5 2 of 2;  Surgeon: Lina Wagner MD;  Location: [facility] PAIN MGT;  Service: Pain Management;  Laterality: Right;    RADIOFREQUENCY ABLATION Left 6/29/2021    Procedure: RADIOFREQUENCY ABLATION GENICULAR COOLED;  Surgeon: Lina Wagner MD;  Location: [facility] PAIN [facility]GT;  Service: Pain Management;  Laterality: Left;  1 of 2    RADIOFREQUENCY ABLATION Right 7/13/2021    Procedure: RADIOFREQUENCY ABLATION GENICULAR;  Surgeon: Lina Wagner MD;  Location: [facility] PAIN [facility]GT;  Service: Pain Management;  Laterality: Right;  2 of 2    RADIOFREQUENCY ABLATION Right 8/24/2021    Procedure: RADIOFREQUENCY ABLATION, L2-L3-L4-L5 MEDIAL BRANCH 1 OF 2;  Surgeon: Lina Wagner MD;  Location: [facility] PAIN MGT;  Service: Pain Management;  Laterality: Right;    RADIOFREQUENCY ABLATION Left 9/11/2021    Procedure: RADIOFREQUENCY ABLATION, L2-L3-L4-L5 MEDIAL BR ANCH 2 OF 2;  Surgeon: Lina Wagner MD;  Location: [facility]IN [facility]GT;  Service: Pain Management;  Laterality: Left;    RADIOFREQUENCY ABLATION Right 3/7/2022    Procedure: RADIOFREQUENCY ABLATION RIGHT L3,4,5 1 of 2, needs consent;  Surgeon: Israel Hurtado MD;  Location: [facility] PAIN MGT;  Service: Pain Management;  Laterality: Right;    RADIOFREQUENCY ABLATION Left 3/21/2022    Procedure: RADIOFREQUENCY ABLATION RIGHT L3,4,5 2 of 2, needs consent;  Surgeon: Israel Hurtado MD;  Location: [facility] PAIN MGT;  Service: Pain Management;  Laterality: Left;    RADIOFREQUENCY ABLATION Right 5/9/2022    Procedure: RADIOFREQUENCY ABLATION RIGHT GENICULAR COOLED 1 of 2;  Surgeon: Israel Hurtado MD;  Location: [facility] PAIN MGT;  Service: Pain Management;  Laterality: Right;    RADIOFREQUENCY ABLATION Left 5/23/2022     Procedure: RADIOFREQUENCY ABLATION LEFT GENICULAR COOLED  2 of 2;  Surgeon: Israel Hurtado MD;  Location: [facility] PAIN MGT;  Service: Pain Management;  Laterality: Left;    RADIOFREQUENCY ABLATION Right 12/12/2022    Procedure: RADIOFREQUENCY ABLATION RIGHT GENICULAR COOLED  ONE OF TWO  NEEDS CONSENT;  Surgeon: Israel Hurtado MD;  Location: [facility]IN [facility]GT;  Service: Pain Management;  Laterality: Right;    RADIOFREQUENCY ABLATION Left 1/9/2023    Procedure: RADIOFREQUENCY ABLATION LEFT GENICULAR COOLED  TWO OF TWO NEEDS CONSENT;  Surgeon: Israel Hurtado MD;  Location: [facility] PAIN MGT;  Service: Pain Management;  Laterality: Left;    RADIOFREQUENCY ABLATION Right 3/6/2023    Procedure: RADIOFREQUENCY ABLATION RIGHT L3,L4,L5;  Surgeon: Israel Hurtado MD;  Location: [facility] PAIN MGT;  Service: Pain Management;  Laterality: Right;    RADIOFREQUENCY ABLATION Left 5/22/2023    Procedure: RADIOFREQUENCY ABLATION LEFT L3,L4,L5;  Surgeon: Israel Hurtado MD;  Location: [facility] PAIN MGT;  Service: Pain Management;  Laterality: Left;  4/3 LM TO R/S    RADIOFREQUENCY ABLATION Right 11/27/2023    Procedure: RADIOFREQUENCY ABLATION RIGHT L3, 4, 5 1 OF 3;  Surgeon: Israel Hurtado MD;  Location: [facility] PAIN MGT;  Service: Pain Management;  Laterality: Right;    RADIOFREQUENCY ABLATION Right 1/22/2024    Procedure: RADIOFREQUENCY ABLATION RIGHT GENICULAR 3 NERVES 3 OF 3;  Surgeon: Israel Hurtado MD;  Location: [facility] PAIN MGT;  Service: Pain Management;  Laterality: Right;    RADIOFREQUENCY ABLATION Left 2/12/2024    Procedure: RADIOFREQUENCY ABLATION LEFT L3, 4, 5;  Surgeon: Israel Hurtado MD;  Location: [facility] PAIN MGT;  Service: Pain Management;  Laterality: Left;  [phone]    RADIOFREQUENCY ABLATION OF LUMBAR MEDIAL BRANCH NERVE AT SINGLE LEVEL Left 6/26/2018    Procedure: RADIOFREQUENCY ABLATION, NERVE, MEDIAL BRANCH, LUMBAR, 1 LEVEL;  Surgeon: Lina Wagner MD;  Location: [facility] PAIN MGT;  Service: Pain Management;  Laterality: Left;  Left Cooled RFA @  L2,3,4,5  [id number]  with Sedation    1 of 2    RADIOFREQUENCY ABLATION OF LUMBAR MEDIAL BRANCH NERVE AT SINGLE LEVEL Right 7/10/2018    Procedure: RADIOFREQUENCY ABLATION, NERVE, MEDIAL BRANCH, LUMBAR, 1 LEVEL;  Surgeon: Lina Wagner MD;  Location: [facility] PAIN MGT;  Service: Pain Management;  Laterality: Right;  RIght Cooled RFA @ L2,3,4,5  [id number] with Sedation    2 of 2    TRIGGER FINGER RELEASE Right 2017    TRIGGER FINGER RELEASE Left 7/29/2019    Procedure: RELEASE, TRIGGER FINGER, Left Thumb;  Surgeon: Velma Garcia MD;  Location: [facility] OR;  Service: Orthopedics;  Laterality: Left;  Local w/ MAC       Family History:  Family History   Problem Relation Age of Onset    Diabetes Mother     Cataracts Mother     Diabetes Father     Cataracts Father     Hypertension Sister     Diabetes Sister     No Known Problems Sister     Cancer Sister         lymphoma     Coronary artery disease Brother     Diabetes Brother     Diabetes Brother     Hypotension Brother     Kidney failure Brother     Hypotension Brother     Amblyopia Neg Hx     Blindness Neg Hx     Glaucoma Neg Hx     Macular degeneration Neg Hx     Retinal detachment Neg Hx     Strabismus Neg Hx     Stroke Neg Hx     Thyroid disease Neg Hx     Anesthesia problems Neg Hx        Social History:  She  reports that she has never smoked. She has never used smokeless tobacco. She reports that she does not currently use alcohol. She reports that she does not use drugs.      MEDICATIONS & ALLERGIES:     Review of patient's allergies indicates:   Allergen Reactions    Strawberry Anaphylaxis       Current Outpatient Medications on File Prior to Visit   Medication Sig Dispense Refill    albuterol (VENTOLIN HFA) 90 mcg/actuation inhaler INHALE TWO PUFFS INTO LUNGS EVERY 4 TO 6 HOURS AS NEEDED FOR SHORTNESS OF BREATH AND FOR WHEEZING 18 g 1    allopurinoL (ZYLOPRIM) 300 MG tablet Take 1 tablet (300 mg total) by mouth 2 (two) times daily. 180 tablet 3     apixaban (ELIQUIS) 5 mg Tab Take 1 tablet (5 mg total) by mouth 2 (two) times daily. 60 tablet 6    atorvastatin (LIPITOR) 80 MG tablet Take 1 tablet (80 mg total) by mouth once daily. 90 tablet 3    b complex vitamins tablet Take 1 tablet by mouth every other day.       calcium citrate/vitamin D2 (ISIAH-CITRATE ORAL) Take 500 mg by mouth 2 (two) times daily.      colchicine, gout, (MITIGARE) 0.6 mg Cap TAKE 1 CAPSULE (0.6 MG TOTAL) BY MOUTH DAILY AS NEEDED (FLARE UP). 90 capsule 1    cyanocobalamin (VITAMIN B-12) 1000 MCG tablet Take 100 mcg by mouth every morning.      diclofenac sodium (VOLTAREN) 1 % Gel Apply 2 g topically 4 (four) times daily as needed. To painful area on the feet. 500 g 5    FLUoxetine 40 MG capsule Take 1 capsule (40 mg total) by mouth once daily. 30 capsule 0    fluticasone propionate (FLONASE) 50 mcg/actuation nasal spray 1 SPRAY BY EACH NOSTRIL ROUTE 2 TIMES DAILY AS NEEDED FOR RHINITIS. 48 g 3    LIDOcaine (XYLOCAINE) 5 % Oint ointment APPLY TOPICALLY AS NEEDED. 35.44 g 6    MULTIVIT-IRON-MIN-FOLIC ACID 3,500-18-0.4 UNIT-MG-MG ORAL CHEW Take 1 tablet by mouth 2 (two) times daily.       nystatin (MYCOSTATIN) powder Apply topically 2 (two) times daily. 60 g 3    omeprazole (PRILOSEC) 20 MG capsule Take 1 capsule (20 mg total) by mouth every morning. 90 capsule 2    oxybutynin (DITROPAN-XL) 5 MG TR24 Take 1 tablet (5 mg total) by mouth once daily. 90 tablet 3    oxyCODONE-acetaminophen (PERCOCET)  mg per tablet Take 1 tablet by mouth every 8 (eight) hours as needed for Pain. 90 tablet 0    tirzepatide (MOUNJARO) 5 mg/0.5 mL PnIj Inject 5 mg into the skin every 7 days. 4 Pen 1    tiZANidine (ZANAFLEX) 4 MG tablet Take 1 tablet (4 mg total) by mouth every 8 (eight) hours as needed (muscle pain). 90 tablet 2    tretinoin microspheres (RETIN-A MICRO PUMP) 0.08 % GlwP Apply to affected area daily 50 g 0    urea (CARMOL) 40 % Crea Apply topically once daily. To dry skin on the feet. 120 g 5     "vitamin D 185 MG Tab Take 5,000 mg by mouth every morning.        Current Facility-Administered Medications on File Prior to Visit   Medication Dose Route Frequency Provider Last Rate Last Admin    0.9%  NaCl infusion   Intravenous Continuous Lina Wagner MD 25 mL/hr at 12/15/20 1506 25 mL/hr at 12/15/20 1506    0.9%  NaCl infusion   Intravenous Continuous Ciro Chung MD            OBJECTIVE:     Vital Signs:  /71   Pulse 74   Ht 5' 5" (1.651 m)   Wt 110.6 kg (243 lb 13.3 oz)   LMP 06/26/2018   BMI 40.58 kg/m²     Physical Exam:  Physical Exam  Constitutional:       General: She is not in acute distress.     Appearance: Normal appearance. She is obese. She is not ill-appearing or diaphoretic.      Comments: Well-appearing woman in NAD.   HENT:      Head: Normocephalic and atraumatic.      Nose: Nose normal.      Mouth/Throat:      Mouth: Mucous membranes are moist.      Pharynx: Oropharynx is clear.   Eyes:      Pupils: Pupils are equal, round, and reactive to light.   Cardiovascular:      Rate and Rhythm: Normal rate and regular rhythm.      Pulses: Normal pulses.      Heart sounds: Normal heart sounds. No murmur heard.     No friction rub. No gallop.   Pulmonary:      Effort: Pulmonary effort is normal. No respiratory distress.      Breath sounds: Normal breath sounds. No wheezing, rhonchi or rales.   Chest:      Chest wall: No tenderness.   Abdominal:      General: There is no distension.      Palpations: Abdomen is soft.      Tenderness: There is no abdominal tenderness.   Musculoskeletal:         General: No swelling or tenderness.      Cervical back: Normal range of motion.      Right lower leg: No edema.      Left lower leg: No edema.   Skin:     General: Skin is warm and dry.      Findings: No erythema, lesion or rash.   Neurological:      General: No focal deficit present.      Mental Status: She is alert and oriented to person, place, and time. Mental status is at baseline.      " Motor: No weakness.      Gait: Gait normal.   Psychiatric:         Mood and Affect: Mood normal.         Behavior: Behavior normal.          Laboratory Data:  Lab Results   Component Value Date    WBC 4.81 03/18/2024    HGB 12.5 03/18/2024    HCT 38.7 03/18/2024    MCV 98 03/18/2024     03/18/2024     Lab Results   Component Value Date    GLU 86 03/18/2024     03/18/2024    K 3.6 03/18/2024     03/18/2024    CO2 28 03/18/2024    BUN 15 03/18/2024    CREATININE 0.7 03/18/2024    CALCIUM 9.7 03/18/2024    MG 1.9 01/06/2024     Lab Results   Component Value Date    INR 1.0 01/04/2024    INR 1.4 12/01/2014    INR 1.5 11/26/2014       Pertinent Cardiac Data:  ECG: Normal sinus rhythm with frequent PACs, rate of 74 bpm,  ms, QRS 74 ms, QT/QTc 372/412 ms.     Resting 2D Transthoracic Echocardiogram - 1/5/2024:    Left Ventricle: The left ventricle is normal in size. Ventricular mass is normal. Normal wall thickness. Normal wall motion. There is normal systolic function with a visually estimated ejection fraction of 60 - 65%. Ejection fraction by visual approximation is 63%. There is normal diastolic function.    Right Ventricle: Normal right ventricular cavity size. Wall thickness is normal. Right ventricle wall motion  is normal. Systolic function is normal.    Left Atrium: Left atrium is mildly dilated.    Right Atrium: Right atrium is mildly dilated.    Aortic Valve: The aortic valve is a trileaflet valve. There is aortic valve sclerosis.    Tricuspid Valve: There is mild regurgitation.    Pulmonary Artery: The estimated pulmonary artery systolic pressure is 32 mmHg.    IVC/SVC: Normal venous pressure at 3 mmHg.    30-Day Ambulatory Event Monitor - 1/23/2024:    Baseline rhythm was normal sinus rhythm with normal intervals and an initial heart rate of 73 bpm.    There were 61 patient-triggered episodes with reported symptoms of heart racing and shortness of breath. These episodes corresponded to  periods of normal sinus rhythm and sinus tachycardia with occasional PVCs and frequent PACs.    There were occasional PVCs and frequent PACs.    There was an event of paroxysmal atrial fibrillation captured on 1/27/2024, at times with rapid ventricular response.      ASSESSMENT & PLAN:   Ms. Alivia Thomas is a rosario 58-year-old woman who presents today for evaluation and recommendations regarding paroxysmal atrial fibrillation. She has a past medical history significant for a prior right MCA stroke on 1/4/2024, with a new diagnosis of paroxysmal atrial fibrillation captured on an ambulatory event monitor, hypertension, hyperlipidemia, type II diabetes mellitus, mild intermittent asthma, chronic low back pain, osteoarthritis, GERD, gout, depression, seasonal allergies, PADDY, and a history of sleeve gastrectomy with obesity.    - We discussed the pathophysiology of atrial fibrillation; specifically, we discussed paroxysmal atrial fibrillation and the concept that some patients may experience paroxysms of atrial fibrillation interrupting periods of sinus rhythm. We discussed that atrial fibrillation has an increased risk of CVA.   - She is presently maintained in sinus rhythm with frequent PACs on ECG today. We reviewed the results of her recent 30-day ambulatory event monitor capturing events of paroxysmal atrial fibrillation. She denies any symptoms of overt palpitations and cannot tell when she is in atrial fibrillation.   - We will start jenelle suppression with metoprolol succinate 25mg po daily for PAC suppression and rate control during her events of paroxysmal atrial fibrillation.   - She has never had a trial of antiarrhythmic therapy. She has no history of underlying coronary artery disease, although this has not been formally assessed. We reviewed the results of her recent resting echocardiogram revealing preserved systolic function, LVEF 60-65%, with no evidence of structural heart disease appreciated on  echocardiogram.   - First line AAD for her in the event this medication is required would be a class Ic agent, such as flecainide or propafenone. We will continue to monitor the frequency of her atrial fibrillation events prior to consideration for AAD initiation.   - Her [id number] is 5 (HTN, T2DM, prior CVA, female gender, age less than 64), portending an annual adjusted risk of CVA of 6.7%. She remains on uninterrupted apixaban therapy with no adverse bleeding events reported.   - We discussed the possibility of catheter ablation with pulmonary vein isolation should she continue to have symptoms and AF despite AAD therapy. She voices understanding, although she would like to attempt rate control with medication and ongoing monitoring at this time.   - Possible underlying drivers of atrial fibrillation were addressed at this appointment, including recommendations for weight loss - now a class I recommendation. Review of laboratory data revealed acceptable TSH at 0.909. She is intermittently compliant with her CPAP machine for treatment of her obstructive sleep apnea.     This patient will return to clinic with me in six months with initiation of metoprolol succinate and continued apixaban therapy in the interim. All questions and concerns were addressed at this encounter.     Signing Physician:       KAI Hogue MD  Electrophysiology Attending

## 2024-03-26 ENCOUNTER — HOSPITAL ENCOUNTER (OUTPATIENT)
Dept: CARDIOLOGY | Facility: CLINIC | Age: 59
Discharge: HOME | End: 2024-03-26
Payer: MEDICARE

## 2024-03-26 ENCOUNTER — OFFICE VISIT (OUTPATIENT)
Dept: ELECTROPHYSIOLOGY | Facility: CLINIC | Age: 59
End: 2024-03-26
Payer: MEDICARE

## 2024-03-26 VITALS
HEIGHT: 65 IN | DIASTOLIC BLOOD PRESSURE: 71 MMHG | WEIGHT: 243.81 LBS | HEART RATE: 74 BPM | BODY MASS INDEX: 40.62 KG/M2 | SYSTOLIC BLOOD PRESSURE: 133 MMHG

## 2024-03-26 DIAGNOSIS — I48.91 NEW ONSET ATRIAL FIBRILLATION: ICD-10-CM

## 2024-03-26 DIAGNOSIS — I70.0 AORTIC ATHEROSCLEROSIS: ICD-10-CM

## 2024-03-26 DIAGNOSIS — Z90.3 HISTORY OF SLEEVE GASTRECTOMY: Chronic | ICD-10-CM

## 2024-03-26 DIAGNOSIS — Z86.73 HISTORY OF STROKE: ICD-10-CM

## 2024-03-26 DIAGNOSIS — E78.2 MIXED HYPERLIPIDEMIA: ICD-10-CM

## 2024-03-26 DIAGNOSIS — F33.41 RECURRENT MAJOR DEPRESSIVE DISORDER, IN PARTIAL REMISSION: ICD-10-CM

## 2024-03-26 DIAGNOSIS — M10.9 GOUT, UNSPECIFIED CAUSE, UNSPECIFIED CHRONICITY, UNSPECIFIED SITE: Chronic | ICD-10-CM

## 2024-03-26 DIAGNOSIS — E66.01 CLASS 3 SEVERE OBESITY WITH BODY MASS INDEX (BMI) OF 40.0 TO 44.9 IN ADULT, UNSPECIFIED OBESITY TYPE, UNSPECIFIED WHETHER SERIOUS COMORBIDITY PRESENT: ICD-10-CM

## 2024-03-26 DIAGNOSIS — I10 PRIMARY HYPERTENSION: ICD-10-CM

## 2024-03-26 DIAGNOSIS — M15.9 OSTEOARTHRITIS OF MULTIPLE JOINTS, UNSPECIFIED OSTEOARTHRITIS TYPE: ICD-10-CM

## 2024-03-26 DIAGNOSIS — I49.8 OTHER CARDIAC ARRHYTHMIA: ICD-10-CM

## 2024-03-26 DIAGNOSIS — I48.0 PAROXYSMAL ATRIAL FIBRILLATION: Primary | ICD-10-CM

## 2024-03-26 DIAGNOSIS — K21.9 GASTROESOPHAGEAL REFLUX DISEASE, UNSPECIFIED WHETHER ESOPHAGITIS PRESENT: ICD-10-CM

## 2024-03-26 DIAGNOSIS — G47.33 OSA (OBSTRUCTIVE SLEEP APNEA): ICD-10-CM

## 2024-03-26 DIAGNOSIS — E11.42 TYPE 2 DIABETES MELLITUS WITH DIABETIC POLYNEUROPATHY, WITHOUT LONG-TERM CURRENT USE OF INSULIN: ICD-10-CM

## 2024-03-26 DIAGNOSIS — J45.20 MILD INTERMITTENT ASTHMA WITHOUT COMPLICATION: Chronic | ICD-10-CM

## 2024-03-26 LAB
OHS QRS DURATION: 74 MS
OHS QTC CALCULATION: 412 MS

## 2024-03-26 PROCEDURE — 93010 ELECTROCARDIOGRAM REPORT: CPT | Mod: S$PBB,,, | Performed by: INTERNAL MEDICINE

## 2024-03-26 PROCEDURE — 93005 ELECTROCARDIOGRAM TRACING: CPT | Mod: PBBFAC | Performed by: INTERNAL MEDICINE

## 2024-03-26 PROCEDURE — 99205 OFFICE O/P NEW HI 60 MIN: CPT | Mod: S$PBB,,, | Performed by: STUDENT IN AN ORGANIZED HEALTH CARE EDUCATION/TRAINING PROGRAM

## 2024-03-26 PROCEDURE — 99214 OFFICE O/P EST MOD 30 MIN: CPT | Mod: PBBFAC,25 | Performed by: STUDENT IN AN ORGANIZED HEALTH CARE EDUCATION/TRAINING PROGRAM

## 2024-03-26 PROCEDURE — 99999 PR PBB SHADOW E&M-EST. PATIENT-LVL IV: CPT | Mod: PBBFAC,,, | Performed by: STUDENT IN AN ORGANIZED HEALTH CARE EDUCATION/TRAINING PROGRAM

## 2024-03-26 RX ORDER — METOPROLOL SUCCINATE 25 MG/1
25 TABLET, EXTENDED RELEASE ORAL DAILY
Qty: 30 TABLET | Refills: 11 | Status: SHIPPED | OUTPATIENT
Start: 2024-03-26 | End: 2024-05-21 | Stop reason: SDUPTHER

## 2024-03-29 PROBLEM — M15.9 OSTEOARTHRITIS OF MULTIPLE JOINTS: Status: ACTIVE | Noted: 2024-03-29

## 2024-03-29 PROBLEM — Z86.73 HISTORY OF STROKE: Status: ACTIVE | Noted: 2024-03-29

## 2024-04-02 DIAGNOSIS — M47.816 SPONDYLOSIS OF LUMBAR REGION WITHOUT MYELOPATHY OR RADICULOPATHY: ICD-10-CM

## 2024-04-02 DIAGNOSIS — M25.562 CHRONIC PAIN OF BOTH KNEES: ICD-10-CM

## 2024-04-02 DIAGNOSIS — G89.4 CHRONIC PAIN SYNDROME: ICD-10-CM

## 2024-04-02 DIAGNOSIS — M51.36 DDD (DEGENERATIVE DISC DISEASE), LUMBAR: ICD-10-CM

## 2024-04-02 DIAGNOSIS — M17.0 PRIMARY OSTEOARTHRITIS OF BOTH KNEES: ICD-10-CM

## 2024-04-02 DIAGNOSIS — M25.561 CHRONIC PAIN OF BOTH KNEES: ICD-10-CM

## 2024-04-02 DIAGNOSIS — G89.29 CHRONIC PAIN OF BOTH KNEES: ICD-10-CM

## 2024-04-02 RX ORDER — OXYCODONE AND ACETAMINOPHEN 10; 325 MG/1; MG/1
1 TABLET ORAL EVERY 8 HOURS PRN
Qty: 90 TABLET | Refills: 0 | Status: SHIPPED | OUTPATIENT
Start: 2024-04-11 | End: 2024-04-29 | Stop reason: SDUPTHER

## 2024-04-02 NOTE — TELEPHONE ENCOUNTER
Patient requesting refill on oxycodone  Last office visit 1/9/2024   shows last refill on 03/11/2024  Patient does have a pain contract on file with [facility] department  Patient last UDS  8/14/2023 was consistent with current therapy     CODEINE   Not Detected   Not Detected Not Detected   Not Detected     MORPHINE   Not Detected   Not Detected Not Detected   Not Detected     6-ACETYLMORPHINE   Not Detected   Not Detected Not Detected   Not Detected     OXYCODONE   Present   Present Present   Not Detected     NOROYXCODONE   Present   Present Present   Present     OXYMORPHONE   Present   Present Present   Not Detected     NOROXYMORPHONE   Not Detected   Not Detected Not Detected   Not Detected     HYDROCODONE   Not Detected   Not Detected Not Detected   Not Detected     NORHYDROCODONE   Not Detected   Not Detected Not Detected   Not Detected     HYDROMORPHONE   Not Detected   Not Detected Not Detected   Not Detected     BUPRENORPHINE   Not Detected   Not Detected Not Detected   Not Detected     NORUBPRENORPHINE   Not Detected   Not Detected Not Detected   Not Detected     FENTANYL   Not Detected   Not Detected Not Detected   Not Detected     NORFENTANYL   Not Detected   Not Detected Not Detected   Not Detected     MEPERIDINE METABOLITE   Not Detected   Not Detected Not Detected   Not Detected     TAPENTADOL   Not Detected   Not Detected Not Detected   Not Detected     TAPENTADOL-O-SULF   Not Detected   Not Detected Not Detected   Not Detected     METHADONE   Not Detected   Not Detected Not Detected   Not Detected     TRAMADOL   Not Detected   Not Detected Not Detected   Not Detected     AMPHETAMINE   Not Detected   Not Detected Not Detected   Not Detected     METHAMPHETAMINE   Not Detected   Not Detected Not Detected   Not Detected     MDMA- ECSTASY   Not Detected   Not Detected Not Detected   Not Detected     MDA   Not Detected   Not Detected Not Detected   Not Detected     MDEA- Kait   Not  Detected   Not Detected Not Detected   Not Detected     METHYLPHENIDATE   Not Detected   Not Detected Not Detected   Not Detected     PHENTERMINE   Not Detected   Not Detected Not Detected   Not Detected     BENZOYLECGONINE   Not Detected   Not Detected Not Detected   Not Detected     ALPRAZOLAM   Not Detected   Not Detected Not Detected   Not Detected     ALPHA-OH-ALPRAZOLAM   Not Detected   Not Detected Not Detected   Not Detected     CLONAZEPAM   Not Detected   Not Detected Not Detected   Not Detected     7-AMINOCLONAZEPAM   Not Detected   Not Detected Not Detected   Not Detected     DIAZEPAM   Not Detected   Not Detected Not Detected   Not Detected     NORDIAZEPAM   Not Detected   Not Detected Not Detected   Not Detected     OXAZEPAM   Not Detected   Not Detected Not Detected   Not Detected     TEMAZEPAM   Not Detected   Not Detected Not Detected   Not Detected     Lorazepam   Not Detected   Not Detected Not Detected   Not Detected     MIDAZOLAM   Not Detected   Not Detected Not Detected   Not Detected     ZOLPIDEM   Not Detected   Not Detected Not Detected   Not Detected     BARBITURATES   Not Detected   Not Detected Not Detected   Not Detected     Creatinine, Urine 20.0 - 400.0 mg/dL 61.1 66.0 R 112.3 93.0 86.0 R, CM 66.1 113.0 R, CM   ETHYL GLUCURONIDE   Not Detected   Not Detected Not Detected   Not Detected     MARIJUANA METABOLITE   Not Detected   Not Detected Not Detected   Not Detected     Comment: INTERPRETIVE INFORMATION: Marijuana Metabolite  The cutoff for Marijuana Metabolite (immunoassay) was  changed from 20 ng/mL to 50 ng/mL,  effective May 15, 2023.   PCP   Not Detected   Not Detected Not Detected   Not Detected     CARISOPRODOL   Not Detected   Not Detected CM Not Detected CM   Not Detected CM     Comment: The carisoprodol immunoassay has cross-reactivity to  carisoprodol and meprobamate.   Naloxone   Not Detected   Not Detected Not Detected         Gabapentin   Not Detected   Not Detected Not  Detected         Pregabalin   Not Detected   Not Detected Not Detected         Alpha-OH-Midazolam   Not Detected   Not Detected Not Detected         Zolpidem Metabolite   Not Detected   Not Detected Not Detected         PAIN MANAGEMENT DRUG PANEL   See Below   See Below CM See Below CM   See Below

## 2024-04-03 ENCOUNTER — PATIENT MESSAGE (OUTPATIENT)
Dept: BARIATRICS | Facility: CLINIC | Age: 59
End: 2024-04-03
Payer: MEDICARE

## 2024-04-08 PROBLEM — I63.511 ACUTE RIGHT MCA STROKE: Status: RESOLVED | Noted: 2024-01-04 | Resolved: 2024-04-08

## 2024-04-09 ENCOUNTER — PATIENT MESSAGE (OUTPATIENT)
Dept: BARIATRICS | Facility: CLINIC | Age: 59
End: 2024-04-09
Payer: MEDICARE

## 2024-04-09 RX ORDER — TIZANIDINE 4 MG/1
4 TABLET ORAL EVERY 8 HOURS PRN
Qty: 90 TABLET | Refills: 2 | Status: SHIPPED | OUTPATIENT
Start: 2024-04-09 | End: 2024-07-08

## 2024-04-16 ENCOUNTER — EXTERNAL HOME HEALTH (OUTPATIENT)
Dept: HOME HEALTH SERVICES | Facility: HOSPITAL | Age: 59
End: 2024-04-16
Payer: MEDICARE

## 2024-04-17 DIAGNOSIS — G89.29 CHRONIC PAIN OF BOTH KNEES: Primary | ICD-10-CM

## 2024-04-17 DIAGNOSIS — M25.562 CHRONIC PAIN OF BOTH KNEES: Primary | ICD-10-CM

## 2024-04-17 DIAGNOSIS — M25.561 CHRONIC PAIN OF BOTH KNEES: Primary | ICD-10-CM

## 2024-04-21 ENCOUNTER — DOCUMENTATION ONLY (OUTPATIENT)
Dept: NEUROLOGY | Facility: HOSPITAL | Age: 59
End: 2024-04-21
Payer: MEDICARE

## 2024-04-22 ENCOUNTER — OFFICE VISIT (OUTPATIENT)
Dept: ORTHOPEDICS | Facility: CLINIC | Age: 59
End: 2024-04-22
Payer: MEDICARE

## 2024-04-22 ENCOUNTER — HOSPITAL ENCOUNTER (OUTPATIENT)
Dept: RADIOLOGY | Facility: HOSPITAL | Age: 59
Discharge: HOME | End: 2024-04-22
Attending: ORTHOPAEDIC SURGERY
Payer: MEDICARE

## 2024-04-22 VITALS — WEIGHT: 247.38 LBS | HEIGHT: 65 IN | BODY MASS INDEX: 41.22 KG/M2

## 2024-04-22 DIAGNOSIS — G89.29 CHRONIC PAIN OF BOTH KNEES: ICD-10-CM

## 2024-04-22 DIAGNOSIS — M25.561 CHRONIC PAIN OF BOTH KNEES: Primary | ICD-10-CM

## 2024-04-22 DIAGNOSIS — G89.29 CHRONIC PAIN OF BOTH KNEES: Primary | ICD-10-CM

## 2024-04-22 DIAGNOSIS — E66.01 MORBID OBESITY WITH BMI OF 40.0-44.9, ADULT: ICD-10-CM

## 2024-04-22 DIAGNOSIS — T84.052D: ICD-10-CM

## 2024-04-22 DIAGNOSIS — M25.562 CHRONIC PAIN OF BOTH KNEES: Primary | ICD-10-CM

## 2024-04-22 DIAGNOSIS — M25.561 CHRONIC PAIN OF BOTH KNEES: ICD-10-CM

## 2024-04-22 DIAGNOSIS — M25.562 CHRONIC PAIN OF BOTH KNEES: ICD-10-CM

## 2024-04-22 PROCEDURE — 99212 OFFICE O/P EST SF 10 MIN: CPT | Mod: PBBFAC,25 | Performed by: ORTHOPAEDIC SURGERY

## 2024-04-22 PROCEDURE — 99214 OFFICE O/P EST MOD 30 MIN: CPT | Mod: S$PBB,,, | Performed by: ORTHOPAEDIC SURGERY

## 2024-04-22 PROCEDURE — 73562 X-RAY EXAM OF KNEE 3: CPT | Mod: 26,50,, | Performed by: RADIOLOGY

## 2024-04-22 PROCEDURE — 73562 X-RAY EXAM OF KNEE 3: CPT | Mod: TC,50

## 2024-04-22 PROCEDURE — 99999 PR PBB SHADOW E&M-EST. PATIENT-LVL II: CPT | Mod: PBBFAC,,, | Performed by: ORTHOPAEDIC SURGERY

## 2024-04-22 NOTE — PROGRESS NOTES
"Subjective:      Patient ID: Alivia Thomas is a 58 y.o. female.    Chief Complaint: Pain of the Left Knee and Pain of the Right Knee    HPI  Alivia Thomas has right knee pain.  Knee is still worse than the left.  It is painful with ambulation.  It was a cane usually painful at rest.  There has been no other significant change.  She is continuing to lose weight.      Review of Systems   Constitutional: Negative for chills, fever and night sweats.   HENT:  Negative for hearing loss.    Eyes:  Negative for blurred vision and double vision.   Cardiovascular:  Negative for chest pain, claudication and leg swelling.   Respiratory:  Negative for shortness of breath.    Endocrine: Negative for polydipsia, polyphagia and polyuria.   Hematologic/Lymphatic: Negative for adenopathy and bleeding problem. Does not bruise/bleed easily.   Skin:  Negative for poor wound healing.   Gastrointestinal:  Negative for diarrhea and heartburn.   Genitourinary:  Negative for bladder incontinence.   Neurological:  Negative for focal weakness, headaches, numbness, paresthesias and sensory change.   Psychiatric/Behavioral:  The patient is not nervous/anxious.    Allergic/Immunologic: Negative for persistent infections.         Objective:      Body mass index is 40.84 kg/m².  Vitals:    04/22/24 0956   Weight: 112.2 kg (247 lb 5.7 oz)   Height: 5' 5.26" (1.658 m)           General    Constitutional: She is oriented to person, place, and time. She appears well-developed and well-nourished.   HENT:   Head: Normocephalic and atraumatic.   Eyes: EOM are normal.   Cardiovascular:  Normal rate.            Pulmonary/Chest: Effort normal.   Neurological: She is alert and oriented to person, place, and time.   Psychiatric: She has a normal mood and affect. Her behavior is normal.     General Musculoskeletal Exam   Gait: antalgic       Right Knee Exam     Inspection   Erythema: absent  Scars: present  Swelling: present  Effusion: " absent  Deformity: absent  Bruising: absent    Tenderness   The patient is tender to palpation of the medial retinaculum and lateral retinaculum.    Range of Motion   Extension:  0   Flexion:  120     Tests   Ligament Examination   Lachman: abnormal - grade I  MCL - Valgus: abnormal - grade I  LCL - Varus: abnormal - grade I    Muscle Strength   Right Lower Extremity   Hip Abduction: 5/5   Quadriceps:  5/5   Hamstrin/5     Vascular Exam       Edema  Right Lower Leg: absent    Allowing for projection the radiographs today in my opinion demonstrate progression lucency about the cement bone interface especially on the tibia.  These were x-rays of the right knee which I personally reviewed.          Assessment:       Encounter Diagnoses   Name Primary?    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  She would like to wait at least 3 months.  We will see her back in 3 months get a date picked out and proceed from there.  In my opinion this is  related to the recall      We will give her a copy of the letter.

## 2024-04-23 ENCOUNTER — OFFICE VISIT (OUTPATIENT)
Dept: ORTHOPEDICS | Facility: CLINIC | Age: 59
End: 2024-04-23
Payer: MEDICARE

## 2024-04-23 DIAGNOSIS — M67.912 ROTATOR CUFF DYSFUNCTION, LEFT: Primary | ICD-10-CM

## 2024-04-23 DIAGNOSIS — M75.52 SUBACROMIAL BURSITIS OF LEFT SHOULDER JOINT: ICD-10-CM

## 2024-04-23 PROCEDURE — 99213 OFFICE O/P EST LOW 20 MIN: CPT | Mod: S$PBB,25,, | Performed by: PHYSICIAN ASSISTANT

## 2024-04-23 PROCEDURE — 20610 DRAIN/INJ JOINT/BURSA W/O US: CPT | Mod: S$PBB,LT,, | Performed by: PHYSICIAN ASSISTANT

## 2024-04-23 PROCEDURE — 99999 PR PBB SHADOW E&M-EST. PATIENT-LVL III: CPT | Mod: PBBFAC,,, | Performed by: PHYSICIAN ASSISTANT

## 2024-04-23 PROCEDURE — 99999PBSHW PR PBB SHADOW TECHNICAL ONLY FILED TO HB: Mod: PBBFAC,,,

## 2024-04-23 PROCEDURE — 99213 OFFICE O/P EST LOW 20 MIN: CPT | Mod: PBBFAC | Performed by: PHYSICIAN ASSISTANT

## 2024-04-23 PROCEDURE — 20610 DRAIN/INJ JOINT/BURSA W/O US: CPT | Mod: PBBFAC,LT | Performed by: PHYSICIAN ASSISTANT

## 2024-04-23 RX ORDER — LIDOCAINE HYDROCHLORIDE 10 MG/ML
2 INJECTION INFILTRATION; PERINEURAL
Status: DISCONTINUED | OUTPATIENT
Start: 2024-04-23 | End: 2024-04-23 | Stop reason: HOSPADM

## 2024-04-23 RX ORDER — TRIAMCINOLONE ACETONIDE 40 MG/ML
40 INJECTION, SUSPENSION INTRA-ARTICULAR; INTRAMUSCULAR
Status: DISCONTINUED | OUTPATIENT
Start: 2024-04-23 | End: 2024-04-23 | Stop reason: HOSPADM

## 2024-04-23 RX ADMIN — LIDOCAINE HYDROCHLORIDE 2 ML: 10 INJECTION, SOLUTION INFILTRATION; PERINEURAL at 08:04

## 2024-04-23 RX ADMIN — TRIAMCINOLONE ACETONIDE 40 MG: 40 INJECTION, SUSPENSION INTRA-ARTICULAR; INTRAMUSCULAR at 08:04

## 2024-04-23 NOTE — PROGRESS NOTES
Patient ID: Alivia Thomas is a 58 y.o. female.    Chief Complaint: Pain of the Left Shoulder      HISTORY:  Alivia Thomas is a 58 y.o. female who returns to me today for follow up of left shoulder pain.  She was last seen by me 1/30/2024.  Today she is doing well, but notes shoulder pain has not improved.  She has pain worse with reaching, lifting.  The pain is mostly in the front of her shoulder.  She denies any new injury.  She is planning for revision knee surgery later this year.       PMH/PSH/FamHx/SocHx:    Unchanged from prior visit.    ROS:  Constitution: Negative for chills, fever and weakness.   Respiratory: Negative for cough and shortness of breath.   Musculoskeletal: Positive for left shoulder  Psychiatric/Behavioral: The patient is not nervous/anxious.       PHYSICAL EXAM:   Left shoulder  Skin intact  No effusion or warmth  ROM is painful  , ER 30, IR hip  + smith  + impingement    ASSESSMENT/PLAN:    Alivia was seen today for pain.    Diagnoses and all orders for this visit:    Rotator cuff dysfunction, left    Subacromial bursitis of left shoulder joint  -     Large Joint Aspiration/Injection: L subacromial bursa    - She elected to proceed with diagnostic/therapeutic left shoulder CSI today.  Recommend rest, ice as needed.   - Follow up in a few months if needed- can repeat injection if this is helpful prior to her surgery  - Follow up if symptoms worsen or fail to improve    If there are any questions prior to this, the patient was instructed to contact the office.

## 2024-04-23 NOTE — PROCEDURES
Large Joint Aspiration/Injection: L subacromial bursa    Date/Time: 4/23/2024 8:30 AM    Performed by: Teresa Cueva PA-C  Authorized by: Teresa Cueva PA-C    Consent Done?:  Yes (Verbal)  Indications:  Pain  Timeout: prior to procedure the correct patient, procedure, and site was verified    Prep: patient was prepped and draped in usual sterile fashion    Local anesthetic:  Topical anesthetic    Details:  Needle Size:  22 G  Approach:  Posterior  Location:  Shoulder  Site:  L subacromial bursa  Medications:  40 mg triamcinolone acetonide 40 mg/mL; 2 mL LIDOcaine HCL 10 mg/ml (1%) 10 mg/mL (1 %)  Patient tolerance:  Patient tolerated the procedure well with no immediate complications

## 2024-04-26 NOTE — TELEPHONE ENCOUNTER
No care due was identified.  [facility] Embedded Care Due Messages. Reference number: [id number].   4/26/2024 12:25:47 PM CDT

## 2024-04-27 RX ORDER — TIRZEPATIDE 2.5 MG/.5ML
2.5 INJECTION, SOLUTION SUBCUTANEOUS WEEKLY
Qty: 4 PEN | Refills: 1 | Status: SHIPPED | OUTPATIENT
Start: 2024-04-27 | End: 2024-05-21

## 2024-04-27 NOTE — TELEPHONE ENCOUNTER
Refill Routing Note   Refill Routing Note   Medication(s) are not appropriate for processing by [facility] for the following reason(s):        New or recently adjusted medication    [facility] action(s):  Defer             Appointments  past 12m or future 3m with PCP    Date Provider   Last Visit   3/19/2024 Clarisse Richardson MD   Next Visit   5/21/2024 Clarisse Richardson MD   ED visits in past 90 days: 0        Note composed:8:15 PM 04/26/2024

## 2024-04-29 ENCOUNTER — PATIENT MESSAGE (OUTPATIENT)
Dept: PAIN MEDICINE | Facility: OTHER | Age: 59
End: 2024-04-29
Payer: MEDICARE

## 2024-04-29 ENCOUNTER — TELEPHONE (OUTPATIENT)
Dept: ORTHOPEDICS | Facility: CLINIC | Age: 59
End: 2024-04-29
Payer: MEDICARE

## 2024-04-29 ENCOUNTER — OFFICE VISIT (OUTPATIENT)
Dept: PAIN MEDICINE | Facility: CLINIC | Age: 59
End: 2024-04-29
Payer: MEDICARE

## 2024-04-29 VITALS
SYSTOLIC BLOOD PRESSURE: 123 MMHG | WEIGHT: 247.13 LBS | DIASTOLIC BLOOD PRESSURE: 65 MMHG | BODY MASS INDEX: 41.18 KG/M2 | HEART RATE: 71 BPM | HEIGHT: 65 IN

## 2024-04-29 DIAGNOSIS — M70.62 GREATER TROCHANTERIC BURSITIS OF BOTH HIPS: ICD-10-CM

## 2024-04-29 DIAGNOSIS — M51.36 DDD (DEGENERATIVE DISC DISEASE), LUMBAR: ICD-10-CM

## 2024-04-29 DIAGNOSIS — G89.29 CHRONIC PAIN OF BOTH KNEES: ICD-10-CM

## 2024-04-29 DIAGNOSIS — M53.3 SACROILIAC JOINT PAIN: Primary | ICD-10-CM

## 2024-04-29 DIAGNOSIS — M25.561 CHRONIC PAIN OF BOTH KNEES: ICD-10-CM

## 2024-04-29 DIAGNOSIS — M25.562 CHRONIC PAIN OF BOTH KNEES: ICD-10-CM

## 2024-04-29 DIAGNOSIS — M70.61 GREATER TROCHANTERIC BURSITIS OF BOTH HIPS: ICD-10-CM

## 2024-04-29 DIAGNOSIS — G89.4 CHRONIC PAIN SYNDROME: ICD-10-CM

## 2024-04-29 DIAGNOSIS — M47.816 SPONDYLOSIS OF LUMBAR REGION WITHOUT MYELOPATHY OR RADICULOPATHY: ICD-10-CM

## 2024-04-29 DIAGNOSIS — M17.0 PRIMARY OSTEOARTHRITIS OF BOTH KNEES: ICD-10-CM

## 2024-04-29 PROCEDURE — 99999 PR PBB SHADOW E&M-EST. PATIENT-LVL IV: CPT | Mod: PBBFAC,,,

## 2024-04-29 PROCEDURE — 99214 OFFICE O/P EST MOD 30 MIN: CPT | Mod: PBBFAC

## 2024-04-29 PROCEDURE — 99214 OFFICE O/P EST MOD 30 MIN: CPT | Mod: S$PBB,,,

## 2024-04-29 RX ORDER — OXYCODONE AND ACETAMINOPHEN 10; 325 MG/1; MG/1
1 TABLET ORAL EVERY 8 HOURS PRN
Qty: 90 TABLET | Refills: 0 | Status: SHIPPED | OUTPATIENT
Start: 2024-04-29 | End: 2024-05-29 | Stop reason: SDUPTHER

## 2024-04-29 NOTE — H&P (VIEW-ONLY)
Chronic patient Established Note (Follow up visit)           SUBJECTIVE:    Interval History 4/29/2024:  Alivia Thomas returns today for follow-up of back pain and right knee pain.  She denies any radicular pain.  She notices bilateral lower back pain with pain into the buttocks and lateral thighs. She takes Percocet TID PRN with good relief.  She denies any perceived side effects. Her pharmacy has been out of the medication and she has been out since 3/10/2024.  The patient denies fever/night sweats, urinary incontinence, bowel incontinence, significant weight changes, significant motor weakness or changes, or loss of sensations. Her pain today is 9/10.    Interval History 1/9/2024:  The patient returns to clinic today for follow up of back pain via virtual visit. She is s/p right L3,4,5 RFA on 11/27/2023. She reports 40-50% relief at this time. She did not have left sided RFA due to illness. Of note, she had a stroke last week. She began having a headache, left sided facial drooping, and slurring of her speech. She did go to the ER where she received TPA. She reports mild weakness into her left arm. She continues to report low back pain. She denies any radicular leg pain. Her pain is worse with activity. She also reports bilateral knee pain, worse with standing and walking. She is scheduled for genicular RFA in the next few weeks. She will reschedule left lumbar RFA. She is taking Percocet as needed with benefit. She denies any adverse effects. She denies any other health changes.      Interval History 10/24/23:  Alivia Thomas returns today for follow-up. Since last seen, they report their pain has been worsening. She last received a toradol shot instead of an epidural as she did not wish to have an epidural.  Today, she is here for follow-up with me to evaluate.  She is requesting to increase her oxycodone to 4 times a day instead of 3 times a day.  I had a long discussion with her and advised her we  either change the medication but not increase it, try repeating radiofrequency ablation since it worked well for her and/or spinal cord stimulator.  Yesterday, surgery was another alternative for managing her pain.  She had a CT scan that we went over the results.  She has multilevel degenerative disease with severe facet degenerative disease and multilevel spinal and foraminal stenosis.  Yesterday, she declined the surgery.  Today she is open to repeating the radiofrequency ablation but she does not want to change the medication.  Previously, the radiofrequency ablation lasted several months up to a year.  The last time she said it lasted about 3-4 month than the pain started coming back gradually over the following 3-4 months.  Overall, she still had good relief.  She is complaining of the right knee as well.  Much less pain on the left knee.  The radicular component of her back pain is in the dorsal thighs bilaterally worse on the right negligible on the left.  No new bowel or bladder symptomatology.  No fever, chills or night sweats.    Interval History 10/23/23: Alivia Thomas returns for follow up. This is a patient of Dr. Hurtado. Her visit with [place] was cancelled last week so she was placed on my schedule today. At her last visit she was having pain radiating down her legs, but did not want to have an epidural. They performed a Toradol shot (short term relief) and referred her to Neurosurgery. She saw Dr. Bartlett today who said either she will need SCS or decompression. On review of her CT and MRI, she has severe facet arthropathy and severe canal stenosis at L3/4 and L4/5. She notes being able to walk <1 block, after which she must sit down. She reports 8-10/10 pain and this is interfering with her life significantly.     Interval History 9/18/2023:  The patient presents today to request an injection for increased back pain. She has been having worsened pain over the past couple of weeks. She does not  wish to have another KERI at this time. She is having lower back pain with radiation down the back of both legs, R>L. She has associated numbness and tingling as well as subjective weakness. No bowel or bladder incontinence. She has not seen neurosurgery. Her pain today is 10/10.    Interval History 8/14/2023:  The patient is here for follow up after procedure for back pain. She is now s/p L5/S1 IL KERI with about 50% relief. She still has lower back pain with radiation down the back of the legs, stopping at the knees. She has associated numbness to lower extremities. Her pain worsens with walking, bending and reaching upward. She has been working on home exercises and stretches without much benefit. She has some benefit with medication as needed.  She report Her pain today is 9/10.    Interval History 7/10/2023:  The patient is here today for evaluation of increased lower back pain. She has a long-standing history of back pain which is mainly axial. She has had worsened symptoms over the past couple of weeks, most severe over the past 3 days. The pain now radiates down the back of both legs with burning and numbness. She finds it difficult to stand or walk for any length of time. No bowel or bladder incontinence. No fever or malaise. Medications are helping somewhat. She continues with home PT exercises as previously taught in PT. Her pain today is 10/10.    Interval History 4/10/2023:  The patient returns to clinic today for follow up of low back and knee pain via virtual visit. She is s/p right L3,4,5 RFA on 3/6/2023. Her left side procedure was rescheduled due to illness. This is scheduled for May 1st. She reports some relief of her right sided low back pain. She continues to report left sided low back pain. She denies any radicular leg pain. She does endorse morning stiffness. She continues to perform a home exercise routine. She is taking Percocet as needed for severe pain. She denies any other health changes.      Interval History 1/30/2023:  The patient returns to clinic today for follow up of low back and knee pain. She is s/p right genicular RFA on 12/12/2022 and left genicular RFA on 1/9/2023. She reports 60% relief of her knee pain. She reports intermittent bilateral knee pain, this is tolerable at this time. She reports increased low back pain. Her pain is constant and aching in nature. Her leg pain is much improved. Her pain is worse with moving from sitting to standing. She does endorse morning stiffness. She continues to participate in physical therapy and a home exercise routine. She continues to take Percocet as needed with benefit and without adverse effects. She denies any other health changes.     Interval History 11/22/2022:  The patient returns to clinic today for follow up of low back and knee pain. She continues to report low back pain that radiates into the posterior aspect of both legs to under her feet. Her pain is worse with moving from sitting to standing. She also endorses morning stiffness. She recently started physical therapy. She has completed one session. She continues to report bilateral knee pain. She endorses worsening pain with the cold weather. Her pain is worse with standing and walking. She continues to take Percocet as needed with benefit and without adverse effects. She denies any other health changes. Her pain today is 8/10.    Interval History 10/5/2022:  She returns for follow-up.  Her knees are doing well.  She has some discomfort but not enough to do procedures.  Her main complaint is lumbar spine pain for the past few weeks that is radiating to the dorsal aspect of both lower extremities.  She has no red flag signs/symptoms.  No new bowel or bladder symptomatology.  The pain radiates from the back down to the plantar aspect of both feet.  It is activity related.  Rest helps some but it does not resolve the pain.  She has not been in therapy for a while.  No recent imaging.  No  recent weight changes.  Otherwise, no new symptomatology.  She goes regularly to her primary care physician for health maintenance.    Interval History 8/9/2022:  Alivia Thomas presents to the clinic for a follow-up appointment for low back and knee pain. She is s/p right genicular RFA on 5/9/2022 and left genicular RFA on 5/22/2022. She reports 60% relief of her knee pain. She also had panniculectomy on 6/14/2022. She is healing well. She continues to report intermittent low back pain, worse with prolonged activity. She denies any radicular leg pain. Her pain is worse prolonged standing and walking. She continues to perform a homoe exercise routine. She continues to take Percocet as needed with benefit and without adverse effects. She denies any other health changes. Her pain today is 7/10.    Interval History 4/9/2022:  The patient returns to clinic today for follow-up bilateral L3, 4, 5 RFA last month.  She reports that she is feeling very well following the procedure and reports 60-70% pain relief.  Today, she reports that her bilateral knee pain has continued to worsen, and she would like to go ahead and repeat bilateral genicular RFA as soon as possible.  She denies any new numbness, tingling, weakness, saddle anesthesia or bowel/bladder incontinence.    Interval History 12/28/2021:  The patient returns to clinic today for follow up via virtual visit. She continues to report bilateral knee pain. This pain is worse with prolonged standing and walking. She continues to report low back and buttock pain. She denies any radicular leg pain. She continues to report a home exercise routine. She continues to report benefit with Percocet. She denies any adverse effects. She denies any other health changes.    Pain Disability Index Review:      10/23/2023     3:37 PM 9/18/2023     1:25 PM 8/14/2023     8:43 AM   Last 3 PDI Scores   Pain Disability Index (PDI) 27 24 43       Pain Medications:  Percocet 10/325 mg TID  PRN pain    Opioid Contract: yes     report:  Reviewed and consistent with medication use as prescribed.    Pain Procedures:   steroid inj and visco-supplementation (series of 3) in left knee- not helpful  3/31/14 Bilateral genicular nerve blocks  2/16/16 Bilateral L2,3,4,5 MBB  3/1/16 Bilateral L2,3,4,5 MBB   4/6/16 Left L2,3,4,5 RFA- significant relief  4/20/16 Right L2,3,4,5 RFA- significant relief  10/5/16 Left L2,3,4,5 cooled RFA  10/19/16 Right L2,3,4,5 cooled RFA  4/26/17 Left L2,3,4,5 cooled RFA  5/9/17 Right L2,3,4,5 cooled RFA  12/26/2017- Left L2,3,4,5 cooled RFA  1/9/2018- Right L2,3,4,5 cooled RFA  6/26/18 Left L2,3,4,5 cooled RFA- 60% relief  7/10/18 Right L2,3,4,5 cooled RFA- 60% relief  9/28/18 TPIs- significant relief  12/27/18 Left L2,3,4,5 RFA- 70% relief  1/29/19 Right L2,3,4,5 RFA- 70% relief  6/18/19 Left L2,3,4,5 RFA- 70% relief  7/9/19 Right L2,3,4,5 RFA- 50% relief  12/19/19 Left L2,3,4,5 RFA- 80% relief  12/31/19 Right L2,3,4,5 RFA- 50% relief  3/2/2020- Right genicular nerve block- 70% relief for one month  3/12/20 Left subacromial bursa injection- 90% relief  5/18/2020- Left genicular nerve block- 70%  6/9/2020- Bilateral SI joint injections- 50% relief  10/13/2020- Right genicular RFA- 50% relief   11/3/2020- Left genicular RFA- 50% relief  12/15/2020- Left L2,3,4,5 RFA  12/29/2020- Right L2,3,4,5 RFA  4/26/2021- Left subacromial bursa'  5/11/2021- Bilateral SI joint injections   6/28/2021- Left genicular RFA  7/13/2021- Right genicular RFA  8/24/2021- Right L2,3,4,5 RFA  9/11/2021- Left L2,3,4,5 RFA  3/7/2022- Right L2,3,4,5 RFA  3/21/2022- Left L2,3,4,5 RFA  5/9/2022- Right genicular RFA  5/23/2022- Left genicular RFA  12/12/2022- Right genicular RFA  1/9/2023- Left genicular RFA  3/6/2023- Right L3,4,5 RFA  7/24/23 L5/S1 IL KERI- 50% relief  11/27/2023- Right L3,4,5 RFA  1/22/2024 - Right Genicular - 60% relief  2/12/2024 - Left L3, L4, L5 RFA - 60% relief     Physical Therapy/Home  Exercise: yes    Imaging:   MRI Lumbar Spine 7/12/2023:  COMPARISON:  10/6/22.     FINDINGS:  Alignment: Grade 1 anterolisthesis at L3-L4 and L4-L5.  No evidence for spondylolysis.     Vertebrae: Degenerative endplate changes throughout the lower lumbar spine.  Hemangioma noted within the L5 vertebral body.  No fracture or marrow infiltrative process.     Discs: Moderate disc height loss at L3-S1.  No evidence for discitis.     Cord: Conus terminates at T12-L1 and appears unremarkable.  Cauda equina appears unremarkable.     Degenerative findings:     T12-L1: No spinal canal stenosis or neuroforaminal narrowing.     L1-L2: Mild facet arthropathy results in mild right neural foraminal narrowing.     L2-L3: Circumferential disc bulge and mild facet arthropathy result in mild effacement of the thecal sac and mild bilateral neural foraminal narrowing.     L3-L4: Uncovering of the intervertebral disc with bulge and severe facet arthropathy result in severe spinal canal stenosis and severe left, moderate right neural foraminal narrowing.     L4-L5: Uncovering of the intervertebral disc with bulge and severe facet arthropathy result in severe spinal canal stenosis and severe left, moderate right neural foraminal narrowing.     L5-S1: Circumferential disc bulge and mild facet arthropathy.  No spinal canal stenosis or neural foraminal narrowing.     Paraspinal muscles & soft tissues: Mild paraspinal muscle atrophy.  Partially visualized markedly enlarged leiomyomatous uterus noted.  There is asymmetric enlargement of the right kidney.     Impression:     1. Multilevel advanced degenerative change of the lumbar spine.  Severe spinal canal stenosis and moderate to severe neural foraminal narrowing noted at L3-L5.  2. Partially visualized markedly enlarged leiomyomatous uterus.  Recommend further evaluation pelvic ultrasound.  3. Asymmetric enlargement of the right kidney.  Recommend further evaluation with retroperitoneal  ultrasound.  This report was flagged in Epic as abnormal.    Xray Knee 3/6/2019:  FINDINGS:  Postop bilateral knee replacements.  The the the the irregularity about the left patella with soft tissue calcifications similar.  Small joint effusion.  Some irregularity of the right patella unchanged as well.     IMPRESSION:      Postop bilateral knee replacement with irregularity about the patella as above.    MRI Cervical Spine 4/24/2018:  FINDINGS:  There is reversal of the normal cervical lordosis which could be related to patient positioning versus muscle spasm.     Cervical vertebral body heights are well maintained without evidence of acute fracture or dislocation.  Marrow signal is unremarkable.     Spinal canal contents are unremarkable.  No abnormal masses or collections.     Individual levels as detailed below:     C2-3: No significant spinal canal stenosis or neuroforaminal narrowing.     C3-4: Facet arthropathy.  No significant spinal canal stenosis or neuroforaminal narrowing.     C4-5: Facet arthropathy.  Small broad-based disc osteophyte complex.  Mild right neuroforaminal narrowing.  Mild spinal canal stenosis.     C5-6: Facet arthropathy.  Uncovertebral joint spurring.  Broad-based posterior disc osteophyte complex.  Mild right neuroforaminal narrowing.  Mild spinal canal stenosis.     C6-7: Facet arthropathy.  No significant spinal canal stenosis or neuroforaminal narrowing.     C7-T1: No significant spinal canal stenosis or neuroforaminal narrowing.     Paraspinal muscles are unremarkable.  Soft tissues are intact.     IMPRESSION:      Mild multilevel cervical spondylosis with mild spinal canal stenosis and mild right neuroforaminal narrowing at C4-5 and C5-6.    Xray Lumbar Spine 9/21/2015:  Results: AP, lateral neutral, lateral flexion , lateral extension, bilateral oblique and spot views.  The alignment of the lumbar spine demonstrates a mild levoscoliosis .  11-mm anterior listhesis of L4 relative  to L5 with no translational abnormalities   seen on flexion and extension views.  The vertebral body heights  are well-maintained  , mild disk space narrowing L4-L5 and L5-S1.   Mild anterior and marginal osteophyte formation seen  throughout the lumbar spine  .  The oblique views demonstrate no   definite spondylolysis.  There is facet joint osseous hypertrophy noted at L3-L4 and L4-L5.  IMPRESSION:         The Significant spondylosis of the lumbar spine with grade 1 anterior listhesis L4 relative to L5.  Facet joint osseous hypertrophy L3-L4 and L4-5.    Allergies:   Review of patient's allergies indicates:   Allergen Reactions    Strawberry Anaphylaxis       Current Medications:   Current Outpatient Medications   Medication Sig Dispense Refill    albuterol (VENTOLIN HFA) 90 mcg/actuation inhaler INHALE TWO PUFFS INTO LUNGS EVERY 4 TO 6 HOURS AS NEEDED FOR SHORTNESS OF BREATH AND FOR WHEEZING 18 g 1    allopurinoL (ZYLOPRIM) 300 MG tablet Take 1 tablet (300 mg total) by mouth 2 (two) times daily. 180 tablet 3    apixaban (ELIQUIS) 5 mg Tab Take 1 tablet (5 mg total) by mouth 2 (two) times daily. 60 tablet 6    atorvastatin (LIPITOR) 80 MG tablet Take 1 tablet (80 mg total) by mouth once daily. 90 tablet 3    b complex vitamins tablet Take 1 tablet by mouth every other day.       calcium citrate/vitamin D2 (ISIAH-CITRATE ORAL) Take 500 mg by mouth 2 (two) times daily.      colchicine, gout, (MITIGARE) 0.6 mg Cap TAKE 1 CAPSULE (0.6 MG TOTAL) BY MOUTH DAILY AS NEEDED (FLARE UP). 90 capsule 1    cyanocobalamin (VITAMIN B-12) 1000 MCG tablet Take 100 mcg by mouth every morning.      diclofenac sodium (VOLTAREN) 1 % Gel Apply 2 g topically 4 (four) times daily as needed. To painful area on the feet. (Patient taking differently: Apply 2 g topically 4 (four) times daily as needed. To painful area on the feet.  Patient can't find it but does use) 500 g 5    fluticasone propionate (FLONASE) 50 mcg/actuation nasal spray 1  SPRAY BY EACH NOSTRIL ROUTE 2 TIMES DAILY AS NEEDED FOR RHINITIS. 48 g 3    LIDOcaine (XYLOCAINE) 5 % Oint ointment APPLY TOPICALLY AS NEEDED. 35.44 g 6    metoprolol succinate (TOPROL-XL) 25 MG 24 hr tablet Take 1 tablet (25 mg total) by mouth once daily. 30 tablet 11    MULTIVIT-IRON-MIN-FOLIC ACID 3,500-18-0.4 UNIT-MG-MG ORAL CHEW Take 1 tablet by mouth 2 (two) times daily.       nystatin (MYCOSTATIN) powder Apply topically 2 (two) times daily. 60 g 3    omeprazole (PRILOSEC) 20 MG capsule TAKE 1 CAPSULE (20 MG TOTAL) BY MOUTH EVERY MORNING. 90 capsule 3    oxybutynin (DITROPAN-XL) 5 MG TR24 Take 1 tablet (5 mg total) by mouth once daily. 90 tablet 3    oxyCODONE-acetaminophen (PERCOCET)  mg per tablet Take 1 tablet by mouth every 8 (eight) hours as needed for Pain. 90 tablet 0    tirzepatide (MOUNJARO) 2.5 mg/0.5 mL PnIj Inject 2.5 mg into the skin once a week. 4 Pen 1    tirzepatide (MOUNJARO) 5 mg/0.5 mL PnIj Inject 5 mg into the skin every 7 days. 4 Pen 1    tiZANidine (ZANAFLEX) 4 MG tablet TAKE 1 TABLET (4 MG TOTAL) BY MOUTH EVERY 8 (EIGHT) HOURS AS NEEDED (MUSCLE PAIN). 90 tablet 2    tretinoin microspheres (RETIN-A MICRO PUMP) 0.08 % GlwP Apply to affected area daily 50 g 0    urea (CARMOL) 40 % Crea Apply topically once daily. To dry skin on the feet. 120 g 5    vitamin D 185 MG Tab Take 5,000 mg by mouth every morning.       FLUoxetine 40 MG capsule Take 1 capsule (40 mg total) by mouth once daily. 30 capsule 0     No current facility-administered medications for this visit.     Facility-Administered Medications Ordered in Other Visits   Medication Dose Route Frequency Provider Last Rate Last Admin    0.9%  NaCl infusion   Intravenous Continuous Lina Wagner MD 25 mL/hr at 12/15/20 1506 25 mL/hr at 12/15/20 1506    0.9%  NaCl infusion   Intravenous Continuous Ciro Chung MD           REVIEW OF SYSTEMS:    GENERAL:  No weight loss, malaise or fevers.  HEENT:  Negative for frequent or  significant headaches.  NECK:  Negative for lumps, goiter, pain and significant neck swelling.  RESPIRATORY:  Negative for cough, wheezing or shortness of breath.  CARDIOVASCULAR:  Negative for chest pain, leg swelling or palpitations. HTN  GI:  Negative for abdominal discomfort, blood in stools or black stools or change in bowel habits. GERD  MUSCULOSKELETAL:  See HPI.  SKIN:  Negative for lesions, rash, and itching.  PSYCH:  Negative for sleep disturbance, mood disorder and recent psychosocial stressors.  HEMATOLOGY/LYMPHOLOGY:  Negative for prolonged bleeding, bruising easily or swollen nodes.  NEURO:   No history of headaches, syncope, paralysis, seizures or tremors.  ENDO: Diabetes  All other reviewed and negative other than HPI.    Past Medical History:  Past Medical History:   Diagnosis Date    Acute right MCA stroke 1/4/2024    Allergy     Anemia     Asthma     Bilateral shoulder bursitis     Cervical stenosis of spine     Chronic pain     DDD (degenerative disc disease), cervical     Depression 01/28/2019    Diabetes mellitus type II     DJD (degenerative joint disease) of knee 06/19/2014    Facet arthritis of lumbar region 12/17/2015    Facet syndrome 12/17/2015    GERD (gastroesophageal reflux disease)     Heartburn     Hyperlipidemia     Hypertension     Morbid obesity     Neuromuscular disorder     Nuclear sclerotic cataract of left eye 8/8/2023    PADDY (obstructive sleep apnea)     Paroxysmal atrial fibrillation 3/19/2024    Proteinuria     Right carpal tunnel syndrome     Sacroiliitis 06/13/2018    Sacroiliitis     Sleep apnea     Uterine fibroid        Past Surgical History:  Past Surgical History:   Procedure Laterality Date    CARPAL TUNNEL RELEASE      CARPAL TUNNEL RELEASE  1980s    left    CARPAL TUNNEL RELEASE  2012    right    CATARACT EXTRACTION W/  INTRAOCULAR LENS IMPLANT Left 8/8/2023    Procedure: EXTRACTION, CATARACT, WITH IOL INSERTION;  Surgeon: Justin Block MD;  Location:  [facility];  Service: Ophthalmology;  Laterality: Left;    CATARACT EXTRACTION W/  INTRAOCULAR LENS IMPLANT Right 8/29/2023    Procedure: EXTRACTION, CATARACT, WITH IOL INSERTION;  Surgeon: Justin Block MD;  Location: [facility] OR;  Service: Ophthalmology;  Laterality: Right;    COLONOSCOPY N/A 6/15/2020    Procedure: COLONOSCOPY;  Surgeon: Jc Koo MD;  Location: [facility] (4TH FLR);  Service: Endoscopy;  Laterality: N/A;  COVID screening on 6/13/20 at [facility]  pt updated on drop off location and no visitor [facility]-    EPIDURAL STEROID INJECTION N/A 7/24/2023    Procedure: INJECTION, STEROID, EPIDURAL, L5/S1 SOONER DATE;  Surgeon: Israel Hurtado MD;  Location: [facility];  Service: Pain Management;  Laterality: N/A;    ESOPHAGOGASTRODUODENOSCOPY N/A 3/27/2019    Procedure: EGD (ESOPHAGOGASTRODUODENOSCOPY);  Surgeon: Robbin Bar MD;  Location: [facility] (2ND FLR);  Service: Endoscopy;  Laterality: N/A;  BMI 61.3/2nd floor case/svn    INJECTION OF JOINT Bilateral 6/9/2020    Procedure: INJECTION, JOINT, BILATERAL SI;  Surgeon: Lina Wagner MD;  Location: [facility];  Service: Pain Management;  Laterality: Bilateral;  B/L SI Joint Injection    INJECTION OF JOINT Bilateral 5/11/2021    Procedure: INJECTION, JOINT, SACROILIAC (SI) need consent;  Surgeon: Lina Wagner MD;  Location: [facility];  Service: Pain Management;  Laterality: Bilateral;    JOINT REPLACEMENT Bilateral     with 2 revisions on rt    KNEE SURGERY  3/2010    orthroscope    KNEE SURGERY  6-19-14    left TKR    LAPAROSCOPIC SLEEVE GASTRECTOMY N/A 8/28/2019    Procedure: GASTRECTOMY, SLEEVE, LAPAROSCOPIC with intraop EGD;  Surgeon: Heriberto Clements MD;  [address]  Service: General;  Laterality: N/A;    PANNICULECTOMY N/A 6/14/2022    Procedure: PANNICULECTOMY;  Surgeon: Rhett Gray MD;  [facility] OR [address] [place] [address];  Service: Plastics;  Laterality: N/A;    RADIOFREQUENCY ABLATION  Right 7/9/2019    Procedure: RADIOFREQUENCY ABLATION;  Surgeon: Lina Wagner MD;  Location: [facility];  Service: Pain Management;  Laterality: Right;  RIGHT RFA L2,3,4,5  2 of 2    RADIOFREQUENCY ABLATION Left 12/19/2019    Procedure: RADIOFREQUENCY ABLATION, LEFT L2-L3-L4-L5 MEDIAL BRANCH 1 OF 2;  Surgeon: Lina Wagner MD;  Location: [facility];  Service: Pain Management;  Laterality: Left;    RADIOFREQUENCY ABLATION Right 12/31/2019    Procedure: RADIOFREQUENCY ABLATION, RIGHT L2-L3-L4-L5  2 OF 2;  Surgeon: Lina Wagner MD;  Location: [facility];  Service: Pain Management;  Laterality: Right;    RADIOFREQUENCY ABLATION Right 10/13/2020    Procedure: RADIOFREQUENCY ABLATION, Genicular Cooled 1 of 2;  Surgeon: Lina Wagner MD;  Location: [facility];  Service: Pain Management;  Laterality: Right;    RADIOFREQUENCY ABLATION Left 11/3/2020    Procedure: RADIOFREQUENCY ABLATION, GENICULAR COOLED  2 OF 2;  Surgeon: Lina Wagner MD;  Location: [facility];  Service: Pain Management;  Laterality: Left;    RADIOFREQUENCY ABLATION Left 12/15/2020    Procedure: RADIOFREQUENCY ABLATION LEFT L2,3,4,5 1 of 2;  Surgeon: Lina Wagner MD;  Location: [facility];  Service: Pain Management;  Laterality: Left;    RADIOFREQUENCY ABLATION Right 12/29/2020    Procedure: RADIOFREQUENCY ABLATION RIGHT L2,3,4,5 2 of 2;  Surgeon: Lina Wagner MD;  Location: [facility];  Service: Pain Management;  Laterality: Right;    RADIOFREQUENCY ABLATION Left 6/29/2021    Procedure: RADIOFREQUENCY ABLATION GENICULAR COOLED;  Surgeon: Lina Wagner MD;  Location: [facility];  Service: Pain Management;  Laterality: Left;  1 of 2    RADIOFREQUENCY ABLATION Right 7/13/2021    Procedure: RADIOFREQUENCY ABLATION GENICULAR;  Surgeon: Lina Wagner MD;  Location: [facility];  Service: Pain Management;  Laterality: Right;  2 of 2    RADIOFREQUENCY ABLATION Right 8/24/2021    Procedure:  RADIOFREQUENCY ABLATION, L2-L3-L4-L5 MEDIAL BRANCH 1 OF 2;  Surgeon: Lina Wagner MD;  Location: [facility];  Service: Pain Management;  Laterality: Right;    RADIOFREQUENCY ABLATION Left 9/11/2021    Procedure: RADIOFREQUENCY ABLATION, L2-L3-L4-L5 MEDIAL BR ANCH 2 OF 2;  Surgeon: Lina Wagner MD;  Location: [facility];  Service: Pain Management;  Laterality: Left;    RADIOFREQUENCY ABLATION Right 3/7/2022    Procedure: RADIOFREQUENCY ABLATION RIGHT L3,4,5 1 of 2, needs consent;  Surgeon: Israel Hurtado MD;  Location: [facility];  Service: Pain Management;  Laterality: Right;    RADIOFREQUENCY ABLATION Left 3/21/2022    Procedure: RADIOFREQUENCY ABLATION RIGHT L3,4,5 2 of 2, needs consent;  Surgeon: Israel Hurtado MD;  Location: [facility];  Service: Pain Management;  Laterality: Left;    RADIOFREQUENCY ABLATION Right 5/9/2022    Procedure: RADIOFREQUENCY ABLATION RIGHT GENICULAR COOLED 1 of 2;  Surgeon: Israel Hurtado MD;  Location: [facility];  Service: Pain Management;  Laterality: Right;    RADIOFREQUENCY ABLATION Left 5/23/2022    Procedure: RADIOFREQUENCY ABLATION LEFT GENICULAR COOLED  2 of 2;  Surgeon: Israel Hurtado MD;  Location: [facility];  Service: Pain Management;  Laterality: Left;    RADIOFREQUENCY ABLATION Right 12/12/2022    Procedure: RADIOFREQUENCY ABLATION RIGHT GENICULAR COOLED  ONE OF TWO  NEEDS CONSENT;  Surgeon: Israel Hurtado MD;  Location: [facility];  Service: Pain Management;  Laterality: Right;    RADIOFREQUENCY ABLATION Left 1/9/2023    Procedure: RADIOFREQUENCY ABLATION LEFT GENICULAR COOLED  TWO OF TWO NEEDS CONSENT;  Surgeon: Israel Hurtado MD;  Location: [facility];  Service: Pain Management;  Laterality: Left;    RADIOFREQUENCY ABLATION Right 3/6/2023    Procedure: RADIOFREQUENCY ABLATION RIGHT L3,L4,L5;  Surgeon: Israel Hurtado MD;  Location: [facility];  Service: Pain Management;  Laterality: Right;    RADIOFREQUENCY ABLATION Left 5/22/2023    Procedure:  RADIOFREQUENCY ABLATION LEFT L3,L4,L5;  Surgeon: Israel Hurtado MD;  Location: [facility];  Service: Pain Management;  Laterality: Left;  4/3 LM TO R/S    RADIOFREQUENCY ABLATION Right 11/27/2023    Procedure: RADIOFREQUENCY ABLATION RIGHT L3, 4, 5 1 OF 3;  Surgeon: Israel Hurtado MD;  Location: [facility];  Service: Pain Management;  Laterality: Right;    RADIOFREQUENCY ABLATION Right 1/22/2024    Procedure: RADIOFREQUENCY ABLATION RIGHT GENICULAR 3 NERVES 3 OF 3;  Surgeon: Israel Hurtado MD;  Location: [facility];  Service: Pain Management;  Laterality: Right;    RADIOFREQUENCY ABLATION Left 2/12/2024    Procedure: RADIOFREQUENCY ABLATION LEFT L3, 4, 5;  Surgeon: Israel Hurtado MD;  Location: [facility];  Service: Pain Management;  Laterality: Left;  [phone]    RADIOFREQUENCY ABLATION OF LUMBAR MEDIAL BRANCH NERVE AT SINGLE LEVEL Left 6/26/2018    Procedure: RADIOFREQUENCY ABLATION, NERVE, MEDIAL BRANCH, LUMBAR, 1 LEVEL;  Surgeon: Lina Wagner MD;  Location: [facility];  Service: Pain Management;  Laterality: Left;  Left Cooled RFA @ L2,3,4,5  [phone]  with Sedation    1 of 2    RADIOFREQUENCY ABLATION OF LUMBAR MEDIAL BRANCH NERVE AT SINGLE LEVEL Right 7/10/2018    Procedure: RADIOFREQUENCY ABLATION, NERVE, MEDIAL BRANCH, LUMBAR, 1 LEVEL;  Surgeon: Lina Wagner MD;  Location: [facility];  Service: Pain Management;  Laterality: Right;  RIght Cooled RFA @ L2,3,4,5  [phone] with Sedation    2 of 2    TRIGGER FINGER RELEASE Right 2017    TRIGGER FINGER RELEASE Left 7/29/2019    Procedure: RELEASE, TRIGGER FINGER, Left Thumb;  Surgeon: Velma Garcia MD;  Location: [facility];  Service: Orthopedics;  Laterality: Left;  Local w/ MAC       Family History:  Family History   Problem Relation Name Age of Onset    Diabetes Mother      Cataracts Mother      Diabetes Father      Cataracts Father      Hypertension Sister      Diabetes Sister      No Known Problems Sister      Cancer  Sister          lymphoma     Coronary artery disease Brother      Diabetes Brother      Diabetes Brother      Hypotension Brother      Kidney failure Brother      Hypotension Brother      Amblyopia Neg Hx      Blindness Neg Hx      Glaucoma Neg Hx      Macular degeneration Neg Hx      Retinal detachment Neg Hx      Strabismus Neg Hx      Stroke Neg Hx      Thyroid disease Neg Hx      Anesthesia problems Neg Hx         Social History:  Social History     Socioeconomic History    Marital status:    Tobacco Use    Smoking status: Never    Smokeless tobacco: Never   Substance and Sexual Activity    Alcohol use: Not Currently     Comment: occasionally     Drug use: No    Sexual activity: Yes     Partners: Female     Birth control/protection: None   Social History Narrative    Disabled. The patient is the youngest of 6 siblings. Single. Lives with single-sex partner.                  Social Determinants of Health     Financial Resource Strain: Medium Risk (1/17/2024)    Overall Financial Resource Strain (CARDIA)     Difficulty of Paying Living Expenses: Somewhat hard   Food Insecurity: No Food Insecurity (1/17/2024)    Hunger Vital Sign     Worried About Running Out of Food in the Last Year: Never true     Ran Out of Food in the Last Year: Never true   Transportation Needs: No Transportation Needs (1/17/2024)    PRAPARE - Transportation     Lack of Transportation (Medical): No     Lack of Transportation (Non-Medical): No   Physical Activity: Insufficiently Active (1/17/2024)    Exercise Vital Sign     Days of Exercise per Week: 4 days     Minutes of Exercise per Session: 30 min   Stress: Stress Concern Present (1/17/2024)    [employer] - Occupational Stress Questionnaire     Feeling of Stress : To some extent   Social Connections: Moderately Integrated (1/17/2024)    Social Connection and Isolation Panel [NHANES]     Frequency of Communication with Friends and Family: More than three  "times a week     Frequency of Social Gatherings with Friends and Family: Once a week     Attends Mandaen Services: More than 4 times per year     Active Member of Clubs or Organizations: No     Attends Club or Organization Meetings: Never     Marital Status:    Housing Stability: High Risk (1/17/2024)    Housing Stability Vital Sign     Unable to Pay for Housing in the Last Year: Yes     Number of Places Lived in the Last Year: 1     Unstable Housing in the Last Year: No       OBJECTIVE:      /65 (BP Location: Left arm, Patient Position: Sitting, BP Method: Large (Automatic))   Pulse 71   Ht 5' 5" (1.651 m)   Wt 112.1 kg (247 lb 2.2 oz)   LMP 06/26/2018   BMI 41.13 kg/m²     PHYSICAL EXAMINATION:    GENERAL: Well appearing, in no acute distress, alert and oriented x3.   PSYCH:  Mood and affect appropriate.   SKIN: Skin color, texture, turgor normal, no rashes or lesions.   HEAD/FACE:  Normocephalic, atraumatic.   CV: Rate regular.  PULM: No evidence of respiratory difficulty, symmetric chest rise.  BACK: Negative SLR bilaterally, but positive to posterior thigh tightness and pain. There is pain with palpation over midline lumbar spine. Limited ROM with pain on flexion and extension. Positive axial loading test bilaterally. Positive tenderness over bilateral SIJ with positive thigh and sacral thrust test, Positive FABERE,Ganselin and Yeoman's test bilaterally. Negative FADIR  EXTREMITIES: No edema.  Significant TTP over bilateral GTB.   MUSCULOSKELETAL: 4/5 strength in right ankle with plantar and dorsiflexion. 4/5 strength in left ankle with plantar and dorsiflexion. 5/5 strength with right knee flexion and extension. 5/5 strength with left knee flexion and extension. 5/5 strength in right EHL, 5/5 strength in left EHL.  NEURO:  Plantar response are downgoing. No clonus. Normal sensation.   GAIT: Antalgic- ambulates without assistance.      ASSESSMENT: 58 y.o. year old female with low back and " knee pain, consistent with the followin. Sacroiliac joint pain  Procedure Order to Pain Management      2. Greater trochanteric bursitis of both hips  Procedure Order to Pain Management            PLAN:     - Previous imaging reviewed. Hospital notes reviewed.     - Procedure order placed for Bilateral SIJ and GTB.    - Continue Percocet 10/325 mg TID PRN. Last fill 3/11/2024. Dr Hurtado refilled today.     - The patient is here today for a refill of current pain medications and they believe these provide effective pain control and improvements in quality of life.  The patient notes no serious side effects, and feels the benefits outweigh the risks.  The patient was reminded of the pain contract that they signed previously as well as the risks and benefits of the medication including possible death.  The updated [place] [employer] prescription monitoring program was reviewed, and the patient has been found to be compliant with current treatment plan. Medication management provided by Dr Hurtado.     - UDS next visit.     - RTC 2-3 weeks after above procedure.     The above plan and management options were discussed at length with patient. Patient is in agreement with the above and verbalized understanding.     Anisa Cole NP  2024

## 2024-04-29 NOTE — TELEPHONE ENCOUNTER
----- Message from Tato Negron sent at 4/29/2024  4:41 PM CDT -----  Regarding: Appt  Contact: pt [phone]  Pt is calling to discuss plan of care to schedule surgery to removal recalled hardware, please call pt @[phone] or [phone]

## 2024-04-29 NOTE — PROGRESS NOTES
Chronic patient Established Note (Follow up visit)           SUBJECTIVE:    Interval History 4/29/2024:  Alivia Thomas returns today for follow-up of back pain and right knee pain.  She denies any radicular pain.  She notices bilateral lower back pain with pain into the buttocks and lateral thighs. She takes Percocet TID PRN with good relief.  She denies any perceived side effects. Her pharmacy has been out of the medication and she has been out since 3/10/2024.  The patient denies fever/night sweats, urinary incontinence, bowel incontinence, significant weight changes, significant motor weakness or changes, or loss of sensations. Her pain today is 9/10.    Interval History 1/9/2024:  The patient returns to clinic today for follow up of back pain via virtual visit. She is s/p right L3,4,5 RFA on 11/27/2023. She reports 40-50% relief at this time. She did not have left sided RFA due to illness. Of note, she had a stroke last week. She began having a headache, left sided facial drooping, and slurring of her speech. She did go to the ER where she received TPA. She reports mild weakness into her left arm. She continues to report low back pain. She denies any radicular leg pain. Her pain is worse with activity. She also reports bilateral knee pain, worse with standing and walking. She is scheduled for genicular RFA in the next few weeks. She will reschedule left lumbar RFA. She is taking Percocet as needed with benefit. She denies any adverse effects. She denies any other health changes.      Interval History 10/24/23:  Alivia Thomas returns today for follow-up. Since last seen, they report their pain has been worsening. She last received a toradol shot instead of an epidural as she did not wish to have an epidural.  Today, she is here for follow-up with me to evaluate.  She is requesting to increase her oxycodone to 4 times a day instead of 3 times a day.  I had a long discussion with her and advised her we  either change the medication but not increase it, try repeating radiofrequency ablation since it worked well for her and/or spinal cord stimulator.  Yesterday, surgery was another alternative for managing her pain.  She had a CT scan that we went over the results.  She has multilevel degenerative disease with severe facet degenerative disease and multilevel spinal and foraminal stenosis.  Yesterday, she declined the surgery.  Today she is open to repeating the radiofrequency ablation but she does not want to change the medication.  Previously, the radiofrequency ablation lasted several months up to a year.  The last time she said it lasted about 3-4 month than the pain started coming back gradually over the following 3-4 months.  Overall, she still had good relief.  She is complaining of the right knee as well.  Much less pain on the left knee.  The radicular component of her back pain is in the dorsal thighs bilaterally worse on the right negligible on the left.  No new bowel or bladder symptomatology.  No fever, chills or night sweats.    Interval History 10/23/23: Alivia Thomas returns for follow up. This is a patient of Dr. Hurtado. Her visit with [place] was cancelled last week so she was placed on my schedule today. At her last visit she was having pain radiating down her legs, but did not want to have an epidural. They performed a Toradol shot (short term relief) and referred her to Neurosurgery. She saw Dr. Bartlett today who said either she will need SCS or decompression. On review of her CT and MRI, she has severe facet arthropathy and severe canal stenosis at L3/4 and L4/5. She notes being able to walk <1 block, after which she must sit down. She reports 8-10/10 pain and this is interfering with her life significantly.     Interval History 9/18/2023:  The patient presents today to request an injection for increased back pain. She has been having worsened pain over the past couple of weeks. She does not  wish to have another KERI at this time. She is having lower back pain with radiation down the back of both legs, R>L. She has associated numbness and tingling as well as subjective weakness. No bowel or bladder incontinence. She has not seen neurosurgery. Her pain today is 10/10.    Interval History 8/14/2023:  The patient is here for follow up after procedure for back pain. She is now s/p L5/S1 IL KERI with about 50% relief. She still has lower back pain with radiation down the back of the legs, stopping at the knees. She has associated numbness to lower extremities. Her pain worsens with walking, bending and reaching upward. She has been working on home exercises and stretches without much benefit. She has some benefit with medication as needed.  She report Her pain today is 9/10.    Interval History 7/10/2023:  The patient is here today for evaluation of increased lower back pain. She has a long-standing history of back pain which is mainly axial. She has had worsened symptoms over the past couple of weeks, most severe over the past 3 days. The pain now radiates down the back of both legs with burning and numbness. She finds it difficult to stand or walk for any length of time. No bowel or bladder incontinence. No fever or malaise. Medications are helping somewhat. She continues with home PT exercises as previously taught in PT. Her pain today is 10/10.    Interval History 4/10/2023:  The patient returns to clinic today for follow up of low back and knee pain via virtual visit. She is s/p right L3,4,5 RFA on 3/6/2023. Her left side procedure was rescheduled due to illness. This is scheduled for May 1st. She reports some relief of her right sided low back pain. She continues to report left sided low back pain. She denies any radicular leg pain. She does endorse morning stiffness. She continues to perform a home exercise routine. She is taking Percocet as needed for severe pain. She denies any other health changes.      Interval History 1/30/2023:  The patient returns to clinic today for follow up of low back and knee pain. She is s/p right genicular RFA on 12/12/2022 and left genicular RFA on 1/9/2023. She reports 60% relief of her knee pain. She reports intermittent bilateral knee pain, this is tolerable at this time. She reports increased low back pain. Her pain is constant and aching in nature. Her leg pain is much improved. Her pain is worse with moving from sitting to standing. She does endorse morning stiffness. She continues to participate in physical therapy and a home exercise routine. She continues to take Percocet as needed with benefit and without adverse effects. She denies any other health changes.     Interval History 11/22/2022:  The patient returns to clinic today for follow up of low back and knee pain. She continues to report low back pain that radiates into the posterior aspect of both legs to under her feet. Her pain is worse with moving from sitting to standing. She also endorses morning stiffness. She recently started physical therapy. She has completed one session. She continues to report bilateral knee pain. She endorses worsening pain with the cold weather. Her pain is worse with standing and walking. She continues to take Percocet as needed with benefit and without adverse effects. She denies any other health changes. Her pain today is 8/10.    Interval History 10/5/2022:  She returns for follow-up.  Her knees are doing well.  She has some discomfort but not enough to do procedures.  Her main complaint is lumbar spine pain for the past few weeks that is radiating to the dorsal aspect of both lower extremities.  She has no red flag signs/symptoms.  No new bowel or bladder symptomatology.  The pain radiates from the back down to the plantar aspect of both feet.  It is activity related.  Rest helps some but it does not resolve the pain.  She has not been in therapy for a while.  No recent imaging.  No  recent weight changes.  Otherwise, no new symptomatology.  She goes regularly to her primary care physician for health maintenance.    Interval History 8/9/2022:  Alivia Thomas presents to the clinic for a follow-up appointment for low back and knee pain. She is s/p right genicular RFA on 5/9/2022 and left genicular RFA on 5/22/2022. She reports 60% relief of her knee pain. She also had panniculectomy on 6/14/2022. She is healing well. She continues to report intermittent low back pain, worse with prolonged activity. She denies any radicular leg pain. Her pain is worse prolonged standing and walking. She continues to perform a homoe exercise routine. She continues to take Percocet as needed with benefit and without adverse effects. She denies any other health changes. Her pain today is 7/10.    Interval History 4/9/2022:  The patient returns to clinic today for follow-up bilateral L3, 4, 5 RFA last month.  She reports that she is feeling very well following the procedure and reports 60-70% pain relief.  Today, she reports that her bilateral knee pain has continued to worsen, and she would like to go ahead and repeat bilateral genicular RFA as soon as possible.  She denies any new numbness, tingling, weakness, saddle anesthesia or bowel/bladder incontinence.    Interval History 12/28/2021:  The patient returns to clinic today for follow up via virtual visit. She continues to report bilateral knee pain. This pain is worse with prolonged standing and walking. She continues to report low back and buttock pain. She denies any radicular leg pain. She continues to report a home exercise routine. She continues to report benefit with Percocet. She denies any adverse effects. She denies any other health changes.    Pain Disability Index Review:      10/23/2023     3:37 PM 9/18/2023     1:25 PM 8/14/2023     8:43 AM   Last 3 PDI Scores   Pain Disability Index (PDI) 27 24 43       Pain Medications:  Percocet 10/325 mg TID  PRN pain    Opioid Contract: yes     report:  Reviewed and consistent with medication use as prescribed.    Pain Procedures:   steroid inj and visco-supplementation (series of 3) in left knee- not helpful  3/31/14 Bilateral genicular nerve blocks  2/16/16 Bilateral L2,3,4,5 MBB  3/1/16 Bilateral L2,3,4,5 MBB   4/6/16 Left L2,3,4,5 RFA- significant relief  4/20/16 Right L2,3,4,5 RFA- significant relief  10/5/16 Left L2,3,4,5 cooled RFA  10/19/16 Right L2,3,4,5 cooled RFA  4/26/17 Left L2,3,4,5 cooled RFA  5/9/17 Right L2,3,4,5 cooled RFA  12/26/2017- Left L2,3,4,5 cooled RFA  1/9/2018- Right L2,3,4,5 cooled RFA  6/26/18 Left L2,3,4,5 cooled RFA- 60% relief  7/10/18 Right L2,3,4,5 cooled RFA- 60% relief  9/28/18 TPIs- significant relief  12/27/18 Left L2,3,4,5 RFA- 70% relief  1/29/19 Right L2,3,4,5 RFA- 70% relief  6/18/19 Left L2,3,4,5 RFA- 70% relief  7/9/19 Right L2,3,4,5 RFA- 50% relief  12/19/19 Left L2,3,4,5 RFA- 80% relief  12/31/19 Right L2,3,4,5 RFA- 50% relief  3/2/2020- Right genicular nerve block- 70% relief for one month  3/12/20 Left subacromial bursa injection- 90% relief  5/18/2020- Left genicular nerve block- 70%  6/9/2020- Bilateral SI joint injections- 50% relief  10/13/2020- Right genicular RFA- 50% relief   11/3/2020- Left genicular RFA- 50% relief  12/15/2020- Left L2,3,4,5 RFA  12/29/2020- Right L2,3,4,5 RFA  4/26/2021- Left subacromial bursa'  5/11/2021- Bilateral SI joint injections   6/28/2021- Left genicular RFA  7/13/2021- Right genicular RFA  8/24/2021- Right L2,3,4,5 RFA  9/11/2021- Left L2,3,4,5 RFA  3/7/2022- Right L2,3,4,5 RFA  3/21/2022- Left L2,3,4,5 RFA  5/9/2022- Right genicular RFA  5/23/2022- Left genicular RFA  12/12/2022- Right genicular RFA  1/9/2023- Left genicular RFA  3/6/2023- Right L3,4,5 RFA  7/24/23 L5/S1 IL KERI- 50% relief  11/27/2023- Right L3,4,5 RFA  1/22/2024 - Right Genicular - 60% relief  2/12/2024 - Left L3, L4, L5 RFA - 60% relief     Physical Therapy/Home  Exercise: yes    Imaging:   MRI Lumbar Spine 7/12/2023:  COMPARISON:  10/6/22.     FINDINGS:  Alignment: Grade 1 anterolisthesis at L3-L4 and L4-L5.  No evidence for spondylolysis.     Vertebrae: Degenerative endplate changes throughout the lower lumbar spine.  Hemangioma noted within the L5 vertebral body.  No fracture or marrow infiltrative process.     Discs: Moderate disc height loss at L3-S1.  No evidence for discitis.     Cord: Conus terminates at T12-L1 and appears unremarkable.  Cauda equina appears unremarkable.     Degenerative findings:     T12-L1: No spinal canal stenosis or neuroforaminal narrowing.     L1-L2: Mild facet arthropathy results in mild right neural foraminal narrowing.     L2-L3: Circumferential disc bulge and mild facet arthropathy result in mild effacement of the thecal sac and mild bilateral neural foraminal narrowing.     L3-L4: Uncovering of the intervertebral disc with bulge and severe facet arthropathy result in severe spinal canal stenosis and severe left, moderate right neural foraminal narrowing.     L4-L5: Uncovering of the intervertebral disc with bulge and severe facet arthropathy result in severe spinal canal stenosis and severe left, moderate right neural foraminal narrowing.     L5-S1: Circumferential disc bulge and mild facet arthropathy.  No spinal canal stenosis or neural foraminal narrowing.     Paraspinal muscles & soft tissues: Mild paraspinal muscle atrophy.  Partially visualized markedly enlarged leiomyomatous uterus noted.  There is asymmetric enlargement of the right kidney.     Impression:     1. Multilevel advanced degenerative change of the lumbar spine.  Severe spinal canal stenosis and moderate to severe neural foraminal narrowing noted at L3-L5.  2. Partially visualized markedly enlarged leiomyomatous uterus.  Recommend further evaluation pelvic ultrasound.  3. Asymmetric enlargement of the right kidney.  Recommend further evaluation with retroperitoneal  ultrasound.  This report was flagged in Epic as abnormal.    Xray Knee 3/6/2019:  FINDINGS:  Postop bilateral knee replacements.  The the the the irregularity about the left patella with soft tissue calcifications similar.  Small joint effusion.  Some irregularity of the right patella unchanged as well.     IMPRESSION:      Postop bilateral knee replacement with irregularity about the patella as above.    MRI Cervical Spine 4/24/2018:  FINDINGS:  There is reversal of the normal cervical lordosis which could be related to patient positioning versus muscle spasm.     Cervical vertebral body heights are well maintained without evidence of acute fracture or dislocation.  Marrow signal is unremarkable.     Spinal canal contents are unremarkable.  No abnormal masses or collections.     Individual levels as detailed below:     C2-3: No significant spinal canal stenosis or neuroforaminal narrowing.     C3-4: Facet arthropathy.  No significant spinal canal stenosis or neuroforaminal narrowing.     C4-5: Facet arthropathy.  Small broad-based disc osteophyte complex.  Mild right neuroforaminal narrowing.  Mild spinal canal stenosis.     C5-6: Facet arthropathy.  Uncovertebral joint spurring.  Broad-based posterior disc osteophyte complex.  Mild right neuroforaminal narrowing.  Mild spinal canal stenosis.     C6-7: Facet arthropathy.  No significant spinal canal stenosis or neuroforaminal narrowing.     C7-T1: No significant spinal canal stenosis or neuroforaminal narrowing.     Paraspinal muscles are unremarkable.  Soft tissues are intact.     IMPRESSION:      Mild multilevel cervical spondylosis with mild spinal canal stenosis and mild right neuroforaminal narrowing at C4-5 and C5-6.    Xray Lumbar Spine 9/21/2015:  Results: AP, lateral neutral, lateral flexion , lateral extension, bilateral oblique and spot views.  The alignment of the lumbar spine demonstrates a mild levoscoliosis .  11-mm anterior listhesis of L4 relative  to L5 with no translational abnormalities   seen on flexion and extension views.  The vertebral body heights  are well-maintained  , mild disk space narrowing L4-L5 and L5-S1.   Mild anterior and marginal osteophyte formation seen  throughout the lumbar spine  .  The oblique views demonstrate no   definite spondylolysis.  There is facet joint osseous hypertrophy noted at L3-L4 and L4-L5.  IMPRESSION:         The Significant spondylosis of the lumbar spine with grade 1 anterior listhesis L4 relative to L5.  Facet joint osseous hypertrophy L3-L4 and L4-5.    Allergies:   Review of patient's allergies indicates:   Allergen Reactions    Strawberry Anaphylaxis       Current Medications:   Current Outpatient Medications   Medication Sig Dispense Refill    albuterol (VENTOLIN HFA) 90 mcg/actuation inhaler INHALE TWO PUFFS INTO LUNGS EVERY 4 TO 6 HOURS AS NEEDED FOR SHORTNESS OF BREATH AND FOR WHEEZING 18 g 1    allopurinoL (ZYLOPRIM) 300 MG tablet Take 1 tablet (300 mg total) by mouth 2 (two) times daily. 180 tablet 3    apixaban (ELIQUIS) 5 mg Tab Take 1 tablet (5 mg total) by mouth 2 (two) times daily. 60 tablet 6    atorvastatin (LIPITOR) 80 MG tablet Take 1 tablet (80 mg total) by mouth once daily. 90 tablet 3    b complex vitamins tablet Take 1 tablet by mouth every other day.       calcium citrate/vitamin D2 (ISIAH-CITRATE ORAL) Take 500 mg by mouth 2 (two) times daily.      colchicine, gout, (MITIGARE) 0.6 mg Cap TAKE 1 CAPSULE (0.6 MG TOTAL) BY MOUTH DAILY AS NEEDED (FLARE UP). 90 capsule 1    cyanocobalamin (VITAMIN B-12) 1000 MCG tablet Take 100 mcg by mouth every morning.      diclofenac sodium (VOLTAREN) 1 % Gel Apply 2 g topically 4 (four) times daily as needed. To painful area on the feet. (Patient taking differently: Apply 2 g topically 4 (four) times daily as needed. To painful area on the feet.  Patient can't find it but does use) 500 g 5    fluticasone propionate (FLONASE) 50 mcg/actuation nasal spray 1  SPRAY BY EACH NOSTRIL ROUTE 2 TIMES DAILY AS NEEDED FOR RHINITIS. 48 g 3    LIDOcaine (XYLOCAINE) 5 % Oint ointment APPLY TOPICALLY AS NEEDED. 35.44 g 6    metoprolol succinate (TOPROL-XL) 25 MG 24 hr tablet Take 1 tablet (25 mg total) by mouth once daily. 30 tablet 11    MULTIVIT-IRON-MIN-FOLIC ACID 3,500-18-0.4 UNIT-MG-MG ORAL CHEW Take 1 tablet by mouth 2 (two) times daily.       nystatin (MYCOSTATIN) powder Apply topically 2 (two) times daily. 60 g 3    omeprazole (PRILOSEC) 20 MG capsule TAKE 1 CAPSULE (20 MG TOTAL) BY MOUTH EVERY MORNING. 90 capsule 3    oxybutynin (DITROPAN-XL) 5 MG TR24 Take 1 tablet (5 mg total) by mouth once daily. 90 tablet 3    oxyCODONE-acetaminophen (PERCOCET)  mg per tablet Take 1 tablet by mouth every 8 (eight) hours as needed for Pain. 90 tablet 0    tirzepatide (MOUNJARO) 2.5 mg/0.5 mL PnIj Inject 2.5 mg into the skin once a week. 4 Pen 1    tirzepatide (MOUNJARO) 5 mg/0.5 mL PnIj Inject 5 mg into the skin every 7 days. 4 Pen 1    tiZANidine (ZANAFLEX) 4 MG tablet TAKE 1 TABLET (4 MG TOTAL) BY MOUTH EVERY 8 (EIGHT) HOURS AS NEEDED (MUSCLE PAIN). 90 tablet 2    tretinoin microspheres (RETIN-A MICRO PUMP) 0.08 % GlwP Apply to affected area daily 50 g 0    urea (CARMOL) 40 % Crea Apply topically once daily. To dry skin on the feet. 120 g 5    vitamin D 185 MG Tab Take 5,000 mg by mouth every morning.       FLUoxetine 40 MG capsule Take 1 capsule (40 mg total) by mouth once daily. 30 capsule 0     No current facility-administered medications for this visit.     Facility-Administered Medications Ordered in Other Visits   Medication Dose Route Frequency Provider Last Rate Last Admin    0.9%  NaCl infusion   Intravenous Continuous iLna Wagner MD 25 mL/hr at 12/15/20 1506 25 mL/hr at 12/15/20 1506    0.9%  NaCl infusion   Intravenous Continuous Ciro Chung MD           REVIEW OF SYSTEMS:    GENERAL:  No weight loss, malaise or fevers.  HEENT:  Negative for frequent or  significant headaches.  NECK:  Negative for lumps, goiter, pain and significant neck swelling.  RESPIRATORY:  Negative for cough, wheezing or shortness of breath.  CARDIOVASCULAR:  Negative for chest pain, leg swelling or palpitations. HTN  GI:  Negative for abdominal discomfort, blood in stools or black stools or change in bowel habits. GERD  MUSCULOSKELETAL:  See HPI.  SKIN:  Negative for lesions, rash, and itching.  PSYCH:  Negative for sleep disturbance, mood disorder and recent psychosocial stressors.  HEMATOLOGY/LYMPHOLOGY:  Negative for prolonged bleeding, bruising easily or swollen nodes.  NEURO:   No history of headaches, syncope, paralysis, seizures or tremors.  ENDO: Diabetes  All other reviewed and negative other than HPI.    Past Medical History:  Past Medical History:   Diagnosis Date    Acute right MCA stroke 1/4/2024    Allergy     Anemia     Asthma     Bilateral shoulder bursitis     Cervical stenosis of spine     Chronic pain     DDD (degenerative disc disease), cervical     Depression 01/28/2019    Diabetes mellitus type II     DJD (degenerative joint disease) of knee 06/19/2014    Facet arthritis of lumbar region 12/17/2015    Facet syndrome 12/17/2015    GERD (gastroesophageal reflux disease)     Heartburn     Hyperlipidemia     Hypertension     Morbid obesity     Neuromuscular disorder     Nuclear sclerotic cataract of left eye 8/8/2023    PADDY (obstructive sleep apnea)     Paroxysmal atrial fibrillation 3/19/2024    Proteinuria     Right carpal tunnel syndrome     Sacroiliitis 06/13/2018    Sacroiliitis     Sleep apnea     Uterine fibroid        Past Surgical History:  Past Surgical History:   Procedure Laterality Date    CARPAL TUNNEL RELEASE      CARPAL TUNNEL RELEASE  1980s    left    CARPAL TUNNEL RELEASE  2012    right    CATARACT EXTRACTION W/  INTRAOCULAR LENS IMPLANT Left 8/8/2023    Procedure: EXTRACTION, CATARACT, WITH IOL INSERTION;  Surgeon: Justin Block MD;  Location:  [facility] OR;  Service: Ophthalmology;  Laterality: Left;    CATARACT EXTRACTION W/  INTRAOCULAR LENS IMPLANT Right 8/29/2023    Procedure: EXTRACTION, CATARACT, WITH IOL INSERTION;  Surgeon: Justin Block MD;  Location: [facility] OR;  Service: Ophthalmology;  Laterality: Right;    COLONOSCOPY N/A 6/15/2020    Procedure: COLONOSCOPY;  Surgeon: Jc Koo MD;  Location: [facility] (4TH FLR);  Service: Endoscopy;  Laterality: N/A;  COVID screening on 6/13/20 at [facility]  pt updated on drop off location and no visitor [facility]-    EPIDURAL STEROID INJECTION N/A 7/24/2023    Procedure: INJECTION, STEROID, EPIDURAL, L5/S1 SOONER DATE;  Surgeon: Israel Hurtado MD;  Location: [facility] PAIN MGT;  Service: Pain Management;  Laterality: N/A;    ESOPHAGOGASTRODUODENOSCOPY N/A 3/27/2019    Procedure: EGD (ESOPHAGOGASTRODUODENOSCOPY);  Surgeon: Robbin Bar MD;  Location: [facility] (2ND FLR);  Service: Endoscopy;  Laterality: N/A;  BMI 61.3/2nd floor case/svn    INJECTION OF JOINT Bilateral 6/9/2020    Procedure: INJECTION, JOINT, BILATERAL SI;  Surgeon: Lina Wagner MD;  Location: [facility] PAIN MGT;  Service: Pain Management;  Laterality: Bilateral;  B/L SI Joint Injection    INJECTION OF JOINT Bilateral 5/11/2021    Procedure: INJECTION, JOINT, SACROILIAC (SI) need consent;  Surgeon: Lina Wagner MD;  Location: [facility] PAIN MGT;  Service: Pain Management;  Laterality: Bilateral;    JOINT REPLACEMENT Bilateral     with 2 revisions on rt    KNEE SURGERY  3/2010    orthroscope    KNEE SURGERY  6-19-14    left TKR    LAPAROSCOPIC SLEEVE GASTRECTOMY N/A 8/28/2019    Procedure: GASTRECTOMY, SLEEVE, LAPAROSCOPIC with intraop EGD;  Surgeon: Heriberto Clements MD;  [address]  Service: General;  Laterality: N/A;    PANNICULECTOMY N/A 6/14/2022    Procedure: PANNICULECTOMY;  Surgeon: Rhett Gray MD;  Location: [facility] OR [address] [place] [address];  Service: Plastics;  Laterality: N/A;    RADIOFREQUENCY ABLATION  Right 7/9/2019    Procedure: RADIOFREQUENCY ABLATION;  Surgeon: Lina Wagner MD;  Location: [facility] PAIN [facility]GT;  Service: Pain Management;  Laterality: Right;  RIGHT RFA L2,3,4,5  2 of 2    RADIOFREQUENCY ABLATION Left 12/19/2019    Procedure: RADIOFREQUENCY ABLATION, LEFT L2-L3-L4-L5 MEDIAL BRANCH 1 OF 2;  Surgeon: Lina Wagner MD;  Location: [facility];  Service: Pain Management;  Laterality: Left;    RADIOFREQUENCY ABLATION Right 12/31/2019    Procedure: RADIOFREQUENCY ABLATION, RIGHT L2-L3-L4-L5  2 OF 2;  Surgeon: Lina Wagner MD;  Location: [facility];  Service: Pain Management;  Laterality: Right;    RADIOFREQUENCY ABLATION Right 10/13/2020    Procedure: RADIOFREQUENCY ABLATION, Genicular Cooled 1 of 2;  Surgeon: Lina Wagner MD;  Location: [facility];  Service: Pain Management;  Laterality: Right;    RADIOFREQUENCY ABLATION Left 11/3/2020    Procedure: RADIOFREQUENCY ABLATION, GENICULAR COOLED  2 OF 2;  Surgeon: Lina Wagner MD;  Location: [facility];  Service: Pain Management;  Laterality: Left;    RADIOFREQUENCY ABLATION Left 12/15/2020    Procedure: RADIOFREQUENCY ABLATION LEFT L2,3,4,5 1 of 2;  Surgeon: Lina Wagner MD;  Location: [facility];  Service: Pain Management;  Laterality: Left;    RADIOFREQUENCY ABLATION Right 12/29/2020    Procedure: RADIOFREQUENCY ABLATION RIGHT L2,3,4,5 2 of 2;  Surgeon: Lina Wagner MD;  Location: [facility];  Service: Pain Management;  Laterality: Right;    RADIOFREQUENCY ABLATION Left 6/29/2021    Procedure: RADIOFREQUENCY ABLATION GENICULAR COOLED;  Surgeon: Lina Wagner MD;  Location: [facility];  Service: Pain Management;  Laterality: Left;  1 of 2    RADIOFREQUENCY ABLATION Right 7/13/2021    Procedure: RADIOFREQUENCY ABLATION GENICULAR;  Surgeon: Lina Wagner MD;  Location: [facility];  Service: Pain Management;  Laterality: Right;  2 of 2    RADIOFREQUENCY ABLATION Right 8/24/2021    Procedure:  RADIOFREQUENCY ABLATION, L2-L3-L4-L5 MEDIAL BRANCH 1 OF 2;  Surgeon: Lina Wagner MD;  Location: [facility];  Service: Pain Management;  Laterality: Right;    RADIOFREQUENCY ABLATION Left 9/11/2021    Procedure: RADIOFREQUENCY ABLATION, L2-L3-L4-L5 MEDIAL BR ANCH 2 OF 2;  Surgeon: Lina Wagner MD;  Location: [facility];  Service: Pain Management;  Laterality: Left;    RADIOFREQUENCY ABLATION Right 3/7/2022    Procedure: RADIOFREQUENCY ABLATION RIGHT L3,4,5 1 of 2, needs consent;  Surgeon: Israel Hurtado MD;  Location: [facility];  Service: Pain Management;  Laterality: Right;    RADIOFREQUENCY ABLATION Left 3/21/2022    Procedure: RADIOFREQUENCY ABLATION RIGHT L3,4,5 2 of 2, needs consent;  Surgeon: Israel Hurtado MD;  Location: [facility];  Service: Pain Management;  Laterality: Left;    RADIOFREQUENCY ABLATION Right 5/9/2022    Procedure: RADIOFREQUENCY ABLATION RIGHT GENICULAR COOLED 1 of 2;  Surgeon: Israel Hurtado MD;  Location: [facility];  Service: Pain Management;  Laterality: Right;    RADIOFREQUENCY ABLATION Left 5/23/2022    Procedure: RADIOFREQUENCY ABLATION LEFT GENICULAR COOLED  2 of 2;  Surgeon: Israel Hurtado MD;  Location: [facility];  Service: Pain Management;  Laterality: Left;    RADIOFREQUENCY ABLATION Right 12/12/2022    Procedure: RADIOFREQUENCY ABLATION RIGHT GENICULAR COOLED  ONE OF TWO  NEEDS CONSENT;  Surgeon: Israel Hurtado MD;  Location: [facility];  Service: Pain Management;  Laterality: Right;    RADIOFREQUENCY ABLATION Left 1/9/2023    Procedure: RADIOFREQUENCY ABLATION LEFT GENICULAR COOLED  TWO OF TWO NEEDS CONSENT;  Surgeon: Israel Hurtado MD;  Location: [facility];  Service: Pain Management;  Laterality: Left;    RADIOFREQUENCY ABLATION Right 3/6/2023    Procedure: RADIOFREQUENCY ABLATION RIGHT L3,L4,L5;  Surgeon: Israel Hurtado MD;  Location: [facility];  Service: Pain Management;  Laterality: Right;    RADIOFREQUENCY ABLATION Left 5/22/2023    Procedure:  RADIOFREQUENCY ABLATION LEFT L3,L4,L5;  Surgeon: Israel Hurtado MD;  Location: [facility];  Service: Pain Management;  Laterality: Left;  4/3 LM TO R/S    RADIOFREQUENCY ABLATION Right 11/27/2023    Procedure: RADIOFREQUENCY ABLATION RIGHT L3, 4, 5 1 OF 3;  Surgeon: Israel Hurtado MD;  Location: [facility];  Service: Pain Management;  Laterality: Right;    RADIOFREQUENCY ABLATION Right 1/22/2024    Procedure: RADIOFREQUENCY ABLATION RIGHT GENICULAR 3 NERVES 3 OF 3;  Surgeon: Israel Hurtado MD;  Location: [facility];  Service: Pain Management;  Laterality: Right;    RADIOFREQUENCY ABLATION Left 2/12/2024    Procedure: RADIOFREQUENCY ABLATION LEFT L3, 4, 5;  Surgeon: Israel Hurtado MD;  Location: [facility];  Service: Pain Management;  Laterality: Left;  [phone]    RADIOFREQUENCY ABLATION OF LUMBAR MEDIAL BRANCH NERVE AT SINGLE LEVEL Left 6/26/2018    Procedure: RADIOFREQUENCY ABLATION, NERVE, MEDIAL BRANCH, LUMBAR, 1 LEVEL;  Surgeon: Lina Wagner MD;  Location: [facility];  Service: Pain Management;  Laterality: Left;  Left Cooled RFA @ L2,3,4,5  [phone]  with Sedation    1 of 2    RADIOFREQUENCY ABLATION OF LUMBAR MEDIAL BRANCH NERVE AT SINGLE LEVEL Right 7/10/2018    Procedure: RADIOFREQUENCY ABLATION, NERVE, MEDIAL BRANCH, LUMBAR, 1 LEVEL;  Surgeon: Lina Wagner MD;  Location: [facility];  Service: Pain Management;  Laterality: Right;  RIght Cooled RFA @ L2,3,4,5  [id number] with Sedation    2 of 2    TRIGGER FINGER RELEASE Right 2017    TRIGGER FINGER RELEASE Left 7/29/2019    Procedure: RELEASE, TRIGGER FINGER, Left Thumb;  Surgeon: Velma Garcia MD;  Location: [facility];  Service: Orthopedics;  Laterality: Left;  Local w/ MAC       Family History:  Family History   Problem Relation Name Age of Onset    Diabetes Mother      Cataracts Mother      Diabetes Father      Cataracts Father      Hypertension Sister      Diabetes Sister      No Known Problems Sister      Cancer  Sister          lymphoma     Coronary artery disease Brother      Diabetes Brother      Diabetes Brother      Hypotension Brother      Kidney failure Brother      Hypotension Brother      Amblyopia Neg Hx      Blindness Neg Hx      Glaucoma Neg Hx      Macular degeneration Neg Hx      Retinal detachment Neg Hx      Strabismus Neg Hx      Stroke Neg Hx      Thyroid disease Neg Hx      Anesthesia problems Neg Hx         Social History:  Social History     Socioeconomic History    Marital status:    Tobacco Use    Smoking status: Never    Smokeless tobacco: Never   Substance and Sexual Activity    Alcohol use: Not Currently     Comment: occasionally     Drug use: No    Sexual activity: Yes     Partners: Female     Birth control/protection: None   Social History Narrative    Disabled. The patient is the youngest of 6 siblings. Single. Lives with single-sex partner.                  Social Determinants of Health     Financial Resource Strain: Medium Risk (1/17/2024)    Overall Financial Resource Strain (CARDIA)     Difficulty of Paying Living Expenses: Somewhat hard   Food Insecurity: No Food Insecurity (1/17/2024)    Hunger Vital Sign     Worried About Running Out of Food in the Last Year: Never true     Ran Out of Food in the Last Year: Never true   Transportation Needs: No Transportation Needs (1/17/2024)    PRAPARE - Transportation     Lack of Transportation (Medical): No     Lack of Transportation (Non-Medical): No   Physical Activity: Insufficiently Active (1/17/2024)    Exercise Vital Sign     Days of Exercise per Week: 4 days     Minutes of Exercise per Session: 30 min   Stress: Stress Concern Present (1/17/2024)    Citizen of [place] of Occupational Health - Occupational Stress Questionnaire     Feeling of Stress : To some extent   Social Connections: Moderately Integrated (1/17/2024)    Social Connection and Isolation Panel [NHANES]     Frequency of Communication with Friends and Family: More than three  "times a week     Frequency of Social Gatherings with Friends and Family: Once a week     Attends Latter-day Services: More than 4 times per year     Active Member of Clubs or Organizations: No     Attends Club or Organization Meetings: Never     Marital Status:    Housing Stability: High Risk (1/17/2024)    Housing Stability Vital Sign     Unable to Pay for Housing in the Last Year: Yes     Number of Places Lived in the Last Year: 1     Unstable Housing in the Last Year: No       OBJECTIVE:      /65 (BP Location: Left arm, Patient Position: Sitting, BP Method: Large (Automatic))   Pulse 71   Ht 5' 5" (1.651 m)   Wt 112.1 kg (247 lb 2.2 oz)   LMP 06/26/2018   BMI 41.13 kg/m²     PHYSICAL EXAMINATION:    GENERAL: Well appearing, in no acute distress, alert and oriented x3.   PSYCH:  Mood and affect appropriate.   SKIN: Skin color, texture, turgor normal, no rashes or lesions.   HEAD/FACE:  Normocephalic, atraumatic.   CV: Rate regular.  PULM: No evidence of respiratory difficulty, symmetric chest rise.  BACK: Negative SLR bilaterally, but positive to posterior thigh tightness and pain. There is pain with palpation over midline lumbar spine. Limited ROM with pain on flexion and extension. Positive axial loading test bilaterally. Positive tenderness over bilateral SIJ with positive thigh and sacral thrust test, Positive FABERE,Ganselin and Yeoman's test bilaterally. Negative FADIR  EXTREMITIES: No edema.  Significant TTP over bilateral GTB.   MUSCULOSKELETAL: 4/5 strength in right ankle with plantar and dorsiflexion. 4/5 strength in left ankle with plantar and dorsiflexion. 5/5 strength with right knee flexion and extension. 5/5 strength with left knee flexion and extension. 5/5 strength in right EHL, 5/5 strength in left EHL.  NEURO:  Plantar response are downgoing. No clonus. Normal sensation.   GAIT: Antalgic- ambulates without assistance.      ASSESSMENT: 58 y.o. year old female with low back and " knee pain, consistent with the followin. Sacroiliac joint pain  Procedure Order to Pain Management      2. Greater trochanteric bursitis of both hips  Procedure Order to Pain Management            PLAN:     - Previous imaging reviewed. Hospital notes reviewed.     - Procedure order placed for Bilateral SIJ and GTB.    - Continue Percocet 10/325 mg TID PRN. Last fill 3/11/2024. Dr Hurtado refilled today.     - The patient is here today for a refill of current pain medications and they believe these provide effective pain control and improvements in quality of life.  The patient notes no serious side effects, and feels the benefits outweigh the risks.  The patient was reminded of the pain contract that they signed previously as well as the risks and benefits of the medication including possible death.  The updated [place] Board of Pharmacy prescription monitoring program was reviewed, and the patient has been found to be compliant with current treatment plan. Medication management provided by Dr Hurtado.     - UDS next visit.     - RTC 2-3 weeks after above procedure.     The above plan and management options were discussed at length with patient. Patient is in agreement with the above and verbalized understanding.     Anisa Cole NP  2024

## 2024-05-02 NOTE — TELEPHONE ENCOUNTER
----- Message from Binu Roca MA sent at 9/30/2019 11:09 AM CDT -----  Contact: Patient      ----- Message -----  From: Nikhil Ray  Sent: 9/30/2019  11:00 AM CDT  To: Tyree Sampsontaz Staff    Who is calling:: Pt    Provider treating:: Dr Clements    Current symptom:: Pt having an issue moving her bowels, has pain in her back and legs. Has tried a laxative and enema, and it isn't relieving the issue.     How long have you had the symptom:: 4 wks    Communication preference:: [phone]    Additional Info::    
Spoke with pt, c/o problems with constipation. Reports last bowel movement Friday,9/27/19 and taking the Glycolax twice daily. She explained she is having pain in her abd, back, and feeling like she needs to have a BM and cannot.  Encouraged good po intake, increasing fiber in her soft diet such as beans, and a Fleet enema. Instructed pt to call us if no relief or if symptoms worsen. Pt verbalized understanding.   
N/A

## 2024-05-06 ENCOUNTER — OFFICE VISIT (OUTPATIENT)
Dept: PODIATRY | Facility: CLINIC | Age: 59
End: 2024-05-06
Payer: MEDICARE

## 2024-05-06 ENCOUNTER — OFFICE VISIT (OUTPATIENT)
Dept: OPHTHALMOLOGY | Facility: CLINIC | Age: 59
End: 2024-05-06
Payer: MEDICARE

## 2024-05-06 VITALS
WEIGHT: 247.13 LBS | HEART RATE: 63 BPM | SYSTOLIC BLOOD PRESSURE: 125 MMHG | HEIGHT: 65 IN | BODY MASS INDEX: 41.18 KG/M2 | DIASTOLIC BLOOD PRESSURE: 79 MMHG

## 2024-05-06 DIAGNOSIS — L84 CORNS AND CALLUS: ICD-10-CM

## 2024-05-06 DIAGNOSIS — M10.9 GOUT OF LEFT FOOT, UNSPECIFIED CAUSE, UNSPECIFIED CHRONICITY: Primary | Chronic | ICD-10-CM

## 2024-05-06 DIAGNOSIS — H20.9 IRITIS OF RIGHT EYE: Primary | ICD-10-CM

## 2024-05-06 DIAGNOSIS — E11.49 TYPE II DIABETES MELLITUS WITH NEUROLOGICAL MANIFESTATIONS: ICD-10-CM

## 2024-05-06 DIAGNOSIS — M79.672 LEFT FOOT PAIN: ICD-10-CM

## 2024-05-06 DIAGNOSIS — Z79.01 CHRONIC ANTICOAGULATION: ICD-10-CM

## 2024-05-06 DIAGNOSIS — Z96.1 PSEUDOPHAKIA: ICD-10-CM

## 2024-05-06 DIAGNOSIS — B35.1 DERMATOPHYTOSIS OF NAIL: ICD-10-CM

## 2024-05-06 PROCEDURE — 99213 OFFICE O/P EST LOW 20 MIN: CPT | Mod: PBBFAC,27,PO,25 | Performed by: OPHTHALMOLOGY

## 2024-05-06 PROCEDURE — 11721 DEBRIDE NAIL 6 OR MORE: CPT | Performed by: PODIATRIST

## 2024-05-06 PROCEDURE — 99999 PR PBB SHADOW E&M-EST. PATIENT-LVL III: CPT | Mod: PBBFAC,,, | Performed by: OPHTHALMOLOGY

## 2024-05-06 PROCEDURE — 92012 INTRM OPH EXAM EST PATIENT: CPT | Mod: S$PBB,,, | Performed by: OPHTHALMOLOGY

## 2024-05-06 PROCEDURE — 11056 PARNG/CUTG B9 HYPRKR LES 2-4: CPT | Mod: Q9,S$PBB,, | Performed by: PODIATRIST

## 2024-05-06 PROCEDURE — 99214 OFFICE O/P EST MOD 30 MIN: CPT | Mod: PBBFAC,PN,25 | Performed by: PODIATRIST

## 2024-05-06 PROCEDURE — 99999 PR PBB SHADOW E&M-EST. PATIENT-LVL IV: CPT | Mod: PBBFAC,,, | Performed by: PODIATRIST

## 2024-05-06 PROCEDURE — 99213 OFFICE O/P EST LOW 20 MIN: CPT | Mod: 25,S$PBB,, | Performed by: PODIATRIST

## 2024-05-06 RX ORDER — INDOMETHACIN 50 MG/1
50 CAPSULE ORAL 2 TIMES DAILY PRN
Qty: 20 CAPSULE | Refills: 0 | Status: SHIPPED | OUTPATIENT
Start: 2024-05-06 | End: 2024-05-16

## 2024-05-06 NOTE — PROGRESS NOTES
Chief Complaint   Patient presents with    Diabetic Foot Exam     Foot Exam/PCP Clarisse Richardson MD  03/19/24           HPI:   The patient is a 58 y.o.  female  who presents for a diabetic foot exam/care   Patient reports + presence of abnormal sensation to the feet  Patient has no history of wounds on the feet.   Hx of foot surgery: none.     She needs Indomethacin for gout flare LEFT foot.  She has history of gout, relieved with indomethacin.  This patient has documented high risk feet requiring routine maintenance secondary to diabetes.          Primary care doctor is: Clarisse Richardson MD  Patient last saw primary care doctor on:  Diabetic Foot Exam (Foot Exam/PCP Clarisse Richardson MD  03/19/24)           Patient Active Problem List   Diagnosis    Morbid obesity    PADDY (obstructive sleep apnea)    Type 2 diabetes mellitus without complication, without long-term current use of insulin    Primary hypertension    Nuclear sclerosis - Both Eyes    Hyperlipemia    Proteinuria    Type 2 diabetes mellitus without retinopathy - Both Eyes    Bilateral knee pain    DJD (degenerative joint disease) of knee    Debility    Prosthetic joint implant failure    Failed total knee arthroplasty    Right knee pain    Knee pain    History of total left knee replacement    Lumbago    CTS (carpal tunnel syndrome)    Right carpal tunnel syndrome    Chronic, continuous use of opioids    Mild intermittent asthma without complication    Left arm pain    Left shoulder pain    Allergic reaction to food    DDD (degenerative disc disease), cervical    Cervical radiculopathy    Cervical spondylosis    Cubital tunnel syndrome on right    Bilateral shoulder bursitis    Cervical stenosis of spinal canal    DDD (degenerative disc disease), thoracic    Thoracic spondylosis    Spondylolisthesis at L4-L5 level    Lumbar spondylosis    Spondylolisthesis of cervical region    Cervical spine instability    Myeloradiculopathy    Neural  foraminal stenosis of cervical spine    DDD (degenerative disc disease), lumbar    Idiopathic scoliosis of thoracolumbar spine    Bilateral shoulder region arthritis    Impingement syndrome of right shoulder    Trochanteric bursitis of both hips    Chronic pain    Depression    Recurrent major depressive disorder, in partial remission    GERD (gastroesophageal reflux disease)    Trigger finger    Dyspnea    BMI 45.0-49.9, adult    Sacroiliac joint pain    Spondylosis without myelopathy    Subacromial bursitis of left shoulder joint    Sacroiliitis    Snoring    BMI 39.0-39.9,adult    S/P panniculectomy    Gout    History of sleeve gastrectomy    History of total right knee replacement    Noncompliance with CPAP treatment    Osteoarthritis of lumbar spine    Aortic atherosclerosis    Uterine fibroid    Nuclear sclerotic cataract of left eye    Nuclear sclerotic cataract of right eye    Impaired range of motion of both knees    Decreased strength of lower extremity    Impaired functional mobility, balance, gait, and endurance    Received tissue plasminogen activator (t-PA) less than 24 hours prior to arrival    Type II diabetes mellitus with neurological manifestations    Paroxysmal atrial fibrillation    History of stroke    Osteoarthritis of multiple joints           Current Outpatient Medications on File Prior to Visit   Medication Sig Dispense Refill    albuterol (VENTOLIN HFA) 90 mcg/actuation inhaler INHALE TWO PUFFS INTO LUNGS EVERY 4 TO 6 HOURS AS NEEDED FOR SHORTNESS OF BREATH AND FOR WHEEZING 18 g 1    allopurinoL (ZYLOPRIM) 300 MG tablet Take 1 tablet (300 mg total) by mouth 2 (two) times daily. 180 tablet 3    apixaban (ELIQUIS) 5 mg Tab Take 1 tablet (5 mg total) by mouth 2 (two) times daily. 60 tablet 6    atorvastatin (LIPITOR) 80 MG tablet Take 1 tablet (80 mg total) by mouth once daily. 90 tablet 3    b complex vitamins tablet Take 1 tablet by mouth every other day.       calcium citrate/vitamin D2  (ISIAH-CITRATE ORAL) Take 500 mg by mouth 2 (two) times daily.      colchicine, gout, (MITIGARE) 0.6 mg Cap TAKE 1 CAPSULE (0.6 MG TOTAL) BY MOUTH DAILY AS NEEDED (FLARE UP). 90 capsule 1    cyanocobalamin (VITAMIN B-12) 1000 MCG tablet Take 100 mcg by mouth every morning.      diclofenac sodium (VOLTAREN) 1 % Gel Apply 2 g topically 4 (four) times daily as needed. To painful area on the feet. (Patient taking differently: Apply 2 g topically 4 (four) times daily as needed. To painful area on the feet.  Patient can't find it but does use) 500 g 5    fluticasone propionate (FLONASE) 50 mcg/actuation nasal spray 1 SPRAY BY EACH NOSTRIL ROUTE 2 TIMES DAILY AS NEEDED FOR RHINITIS. 48 g 3    LIDOcaine (XYLOCAINE) 5 % Oint ointment APPLY TOPICALLY AS NEEDED. 35.44 g 6    metoprolol succinate (TOPROL-XL) 25 MG 24 hr tablet Take 1 tablet (25 mg total) by mouth once daily. 30 tablet 11    MULTIVIT-IRON-MIN-FOLIC ACID 3,500-18-0.4 UNIT-MG-MG ORAL CHEW Take 1 tablet by mouth 2 (two) times daily.       nystatin (MYCOSTATIN) powder Apply topically 2 (two) times daily. 60 g 3    omeprazole (PRILOSEC) 20 MG capsule TAKE 1 CAPSULE (20 MG TOTAL) BY MOUTH EVERY MORNING. 90 capsule 3    oxybutynin (DITROPAN-XL) 5 MG TR24 Take 1 tablet (5 mg total) by mouth once daily. 90 tablet 3    oxyCODONE-acetaminophen (PERCOCET)  mg per tablet Take 1 tablet by mouth every 8 (eight) hours as needed for Pain. 90 tablet 0    tirzepatide (MOUNJARO) 2.5 mg/0.5 mL PnIj Inject 2.5 mg into the skin once a week. 4 Pen 1    tirzepatide (MOUNJARO) 5 mg/0.5 mL PnIj Inject 5 mg into the skin every 7 days. 4 Pen 1    tiZANidine (ZANAFLEX) 4 MG tablet TAKE 1 TABLET (4 MG TOTAL) BY MOUTH EVERY 8 (EIGHT) HOURS AS NEEDED (MUSCLE PAIN). 90 tablet 2    tretinoin microspheres (RETIN-A MICRO PUMP) 0.08 % GlwP Apply to affected area daily 50 g 0    urea (CARMOL) 40 % Crea Apply topically once daily. To dry skin on the feet. 120 g 5    vitamin D 185 MG Tab Take 5,000  mg by mouth every morning.       FLUoxetine 40 MG capsule Take 1 capsule (40 mg total) by mouth once daily. 30 capsule 0     Current Facility-Administered Medications on File Prior to Visit   Medication Dose Route Frequency Provider Last Rate Last Admin    0.9%  NaCl infusion   Intravenous Continuous Lina Wagner MD 25 mL/hr at 12/15/20 1506 25 mL/hr at 12/15/20 1506    0.9%  NaCl infusion   Intravenous Continuous Ciro Chung MD           Review of patient's allergies indicates:  No Known Allergies      ROS:  General ROS: negative  Respiratory ROS: no cough, shortness of breath, or wheezing  Cardiovascular ROS: no chest pain or dyspnea on exertion  Musculoskeletal ROS: negative  Neurological ROS:   negative for - gait disturbance, + numbness/tingling, seizures or tremors  Dermatological ROS: + nail changes, dry skin      LAST HbA1c:   Hemoglobin A1C   Date Value Ref Range Status   01/05/2024 6.2 (H) 4.0 - 5.6 % Final     Comment:     ADA Screening Guidelines:  5.7-6.4%  Consistent with prediabetes  >or=6.5%  Consistent with diabetes    High levels of fetal hemoglobin interfere with the HbA1C  assay. Heterozygous hemoglobin variants (HbS, HgC, etc)do  not significantly interfere with this assay.   However, presence of multiple variants may affect accuracy.     09/18/2023 6.3 (H) 4.0 - 5.6 % Final     Comment:     ADA Screening Guidelines:  5.7-6.4%  Consistent with prediabetes  >or=6.5%  Consistent with diabetes    High levels of fetal hemoglobin interfere with the HbA1C  assay. Heterozygous hemoglobin variants (HbS, HgC, etc)do  not significantly interfere with this assay.   However, presence of multiple variants may affect accuracy.     03/13/2023 6.3 (H) 4.0 - 5.6 % Final     Comment:     ADA Screening Guidelines:  5.7-6.4%  Consistent with prediabetes  >or=6.5%  Consistent with diabetes    High levels of fetal hemoglobin interfere with the HbA1C  assay. Heterozygous hemoglobin variants (HbS, HgC,  "etc)do  not significantly interfere with this assay.   However, presence of multiple variants may affect accuracy.           EXAM:   Vitals:    05/06/24 0842   BP: 125/79   Pulse: 63   Weight: 112.1 kg (247 lb 2.2 oz)   Height: 5' 5" (1.651 m)         General: Pt. is well-developed, well-nourished, appears stated age, in no acute distress, alert and oriented x 3.      Vascular: Dorsalis pedis and posterior tibial pulses are 2/4 bilaterally. 3 secs capillary refill time and toes are warm to touch.    There is reduced hair growth on the feet.    There is 1+ edema.  no varicosities.        Neurological:  Light touch, proprioception, and sharp/dull sensation are all intact bilaterally. Protective threshold with the [place] monofilament is diminished bilaterally.   +paresthesias      Dermatological:  The skin is thin    The toenails  1-5 L and 1-5 R  are greenish- yellow, long by 5-6mm and thickened by 2-3mm with subungual debris  .   There are no open wounds. There is no ecchymoses.  no erythema noted.   Skin diffusely dry and xerotic on the soles, worse on the left.   Multiple nucleated     Interdigital spaces are intact, no fissures    Skin atrophic, thin, dry and mildly hyperpigmented.         Musculoskeletal:    Muscle strength is 5/5 in all groups bilaterally.    Metatarsophalangeal, subtalar, and ankle range of motion are intact without crepitus.     Ankle equinus bilateral   tenderness to palpation, LEFT midfoot.  No significant swelling.  Slightly warm to touch.         Assessment / Plan:      ICD-10-CM ICD-9-CM    1. Gout of left foot, unspecified cause, unspecified chronicity  M10.9 274.9 indomethacin (INDOCIN) 50 MG capsule      2. Type II diabetes mellitus with neurological manifestations  E11.49 250.60 Routine Foot Care      3. Dermatophytosis of nail  B35.1 110.1 Routine Foot Care      4. Corns and callus  L84 700 Routine Foot Care      5. Left foot pain  M79.672 729.5 indomethacin (INDOCIN) 50 MG " "capsule      6. Chronic anticoagulation  Z79.01 V58.61 Routine Foot Care                  I counseled the patient on the patient's conditions, their implications and medical management.  Prescription medication as above  Shoe inspection.     Continue good nutrition and blood sugar control to help prevent podiatric complications of diabetes.   Maintain proper foot hygiene.   Continue wearing proper shoe gear, daily foot inspections, never walking without protective shoe gear, never putting sharp instruments to feet.  Shoe and activity modification as needed for relief.   Continue urea cream daily. Socks to keep moisturize while sleeping.       Routine Foot Care    Date/Time: 5/6/2024 8:30 AM    Performed by: Stacey Rowland DPM  Authorized by: Stacey Rowland DPM    Time out: Immediately prior to procedure a "time out" was called to verify the correct patient, procedure, equipment, support staff and site/side marked as required.    Hyperkeratotic Skin Lesions?: Yes    Number of trimmed lesions:  2  Location(s):  Left Plantar and Right Plantar    Nail Care Type:  Debride  Location(s): All  (Left 1st Toe, Left 3rd Toe, Left 2nd Toe, Left 4th Toe, Left 5th Toe, Right 1st Toe, Right 2nd Toe, Right 3rd Toe, Right 4th Toe and Right 5th Toe)  Patient tolerance:  Patient tolerated the procedure well with no immediate complications     With patient's permission, the toenails mentioned above were reduced and debrided using a nail nipper, removing offending nail and debris.   Utilizing a #15 scalpel, I trimmed the corns and calluses at the above mentioned location.      The patient will continue to monitor the areas daily, inspect the feet, wear protective shoe gear when ambulatory, and moisturizer to maintain skin integrity.     "

## 2024-05-06 NOTE — PATIENT INSTRUCTIONS
How to Check Your Feet    Below are tips to help you look for foot problems. Try to check your feet at the same time each day, such as when you get out of bed in the morning.    Check the top of each foot. The tops of toes, back of the heel, and outer edge of the foot can get a lot of rubbing from poor-fitting shoes.    Check the bottom of each foot. Daily wear and tear often leads to problems at pressure spots.    Check the toes and nails. Fungal infections often occur between toes. Toenail problems can also be a sign of fungal infections or lead to breaks in the skin.    Check your shoes, too. Loose objects inside a shoe can injure the foot. Use your hand to feel inside your shoes for things like alie, loose stitching, or rough areas that could irritate your skin.        Diabetic Foot Care    Diabetes can lead to a number of different foot complications. Fortunately, most of these complications can be prevented with a little extra foot care. If diabetes is not well controlled, the high blood sugar can cause damage to blood vessels and result in poor circulation to the foot. When the skin does not get enough blood flow, it becomes prone to pressure sores and ulcers, which heal slowly.  High blood sugar can also damage nerves, interfering with the ability to feel pain and pressure. When you cant feel your foot normally, it is easy to injure your skin, bones and joints without knowing it. For these reasons diabetes increases the risk of fungal infections, bunions and ulcers. Deep ulcers can lead to bone infection. Gangrene is the most serious foot complication of diabetes. It usually occurs on the tips of the toes as blacked areas of skin. The black area is dead tissue. In severe cases, gangrene spreads to involve the entire toe, other toes and the entire foot. Foot or toe amputation may be required. Good foot care and blood sugar control can prevent this.    Home Care  Wear comfortable, proper fitting  shoes.  Wash your feet daily with warm water and mild soap.  After drying, apply a moisturizing cream or lotion.  Check your feet daily for skin breaks, blisters, swelling, or redness. Look between your toes also.  Wear cotton socks and change them every day.  Trim toe nails carefully and do not cut your cuticles.  Strive to keep your blood sugar under control with a combination of medicines, diet and activity.  If you smoke and have diabetes, it is very important that you stop. Smoking reduces blood flow to your foot.  Avoid activities that increase your risk of foot injury:  Do not walk barefoot.  Do not use heating pads or hot water bottles on your feet.  Do not put your foot in a hot tub without first checking the temperature with your hand.  10) Schedule yearly foot exams.    Follow Up  with your doctor or as advised by our staff. Report any cut, puncture, scrape, other injury, blister, ingrown toenail or ulcer on your foot.    Get Prompt Medical Attention  if any of the following occur:  -- Open ulcer with pus draining from the wound  -- Increasing foot or leg pain  -- New areas of redness or swelling or tender areas of the foot    © 1126-6892 [employer]. [address] [place] [address], [place] [address]. All rights reserved. This information is not intended as a substitute for professional medical care. Always follow your healthcare professional's instructions.

## 2024-05-06 NOTE — PROGRESS NOTES
Subjective:       Patient ID: Alivia Thomas is a 58 y.o. female.    Chief Complaint: Eye Problem    HPI    Urgent Care visit today  DLS: 02/20/2024 Dr. Dodson  DLS: 05/29/2023 Dr. Block    PC IOL OD 08/29/2023 Dr. Block  PC IOL OS 08/08/2023 Dr. Block    Patient states she woke up yesterday with a reddish pink right eye. She is   sensitive to the light and had pain when she looks up. (+)floaters OD.     No gtts.       Last edited by Chelsea Andersen on 5/6/2024 11:02 AM.             Assessment:       1. Iritis of right eye    2. Pseudophakia        Plan:       Iritis OD-Needs PF.      Start PF qid OD & taper off over 4 wks.  RTC 5/15/24.

## 2024-05-08 ENCOUNTER — TELEPHONE (OUTPATIENT)
Dept: ORTHOPEDICS | Facility: CLINIC | Age: 59
End: 2024-05-08
Payer: MEDICARE

## 2024-05-08 NOTE — TELEPHONE ENCOUNTER
Called pt and stated that we do not have anything available sooner that her appointment date but pt did state she would like surgery in early July and I did hold a 7/2/24 surgery date for her. Pt verbalized understanding and has no further questions.        ----- Message from Karen Giraldo sent at 5/8/2024 10:23 AM CDT -----  Name of Who is Calling:    Pt called      What is the request in detail:    The pt is having some more pain and would like to be seen sooner for getting her surgery scheduled. Please advise       Can the clinic reply by [facility]:  no        What Number to Call Back if not in [facility]: Telephone Information:  Mobile          [phone]

## 2024-05-09 ENCOUNTER — PATIENT MESSAGE (OUTPATIENT)
Dept: PAIN MEDICINE | Facility: OTHER | Age: 59
End: 2024-05-09
Payer: MEDICARE

## 2024-05-12 RX ORDER — SODIUM CHLORIDE 9 MG/ML
INJECTION, SOLUTION INTRAVENOUS CONTINUOUS
Status: CANCELLED | OUTPATIENT
Start: 2024-05-13

## 2024-05-14 ENCOUNTER — PATIENT MESSAGE (OUTPATIENT)
Dept: BARIATRICS | Facility: CLINIC | Age: 59
End: 2024-05-14
Payer: MEDICARE

## 2024-05-15 ENCOUNTER — OFFICE VISIT (OUTPATIENT)
Dept: OPHTHALMOLOGY | Facility: CLINIC | Age: 59
End: 2024-05-15
Payer: MEDICARE

## 2024-05-15 DIAGNOSIS — Z96.1 PSEUDOPHAKIA: ICD-10-CM

## 2024-05-15 DIAGNOSIS — H20.9 IRITIS OF RIGHT EYE: Primary | ICD-10-CM

## 2024-05-15 PROCEDURE — 99213 OFFICE O/P EST LOW 20 MIN: CPT | Mod: PBBFAC | Performed by: OPHTHALMOLOGY

## 2024-05-15 PROCEDURE — 99999 PR PBB SHADOW E&M-EST. PATIENT-LVL III: CPT | Mod: PBBFAC,,, | Performed by: OPHTHALMOLOGY

## 2024-05-15 PROCEDURE — 92012 INTRM OPH EXAM EST PATIENT: CPT | Mod: S$PBB,,, | Performed by: OPHTHALMOLOGY

## 2024-05-15 RX ORDER — PREDNISOLONE ACETATE 10 MG/ML
1 SUSPENSION/ DROPS OPHTHALMIC 3 TIMES DAILY
COMMUNITY
Start: 2024-05-06

## 2024-05-15 NOTE — PROGRESS NOTES
Subjective:       Patient ID: Alivia Thomas is a 58 y.o. female.    Chief Complaint: Iritis (1 week iritis ck and pt states OD is feeling much better and denies any pain today )    HPI     Iritis     Additional comments: 1 week iritis ck and pt states OD is feeling much   better and denies any pain today            Comments    DLS: 5/6/24    1. Iritis OD  2. PCIOL OU    MEDS:  PF TID OD          Last edited by Marcy Brown MA on 5/15/2024 10:00 AM.             Assessment:       1. Iritis of right eye    2. Pseudophakia        Plan:       Iritis OD-Resolving on PF taper.        Taper off PF OD over 3 wks.  RTC Dr Dodson in 2/25 as scheduled.

## 2024-05-16 ENCOUNTER — HOSPITAL ENCOUNTER (OUTPATIENT)
Facility: OTHER | Age: 59
Discharge: HOME | End: 2024-05-16
Attending: ANESTHESIOLOGY | Admitting: ANESTHESIOLOGY
Payer: MEDICARE

## 2024-05-16 VITALS
RESPIRATION RATE: 16 BRPM | HEART RATE: 55 BPM | HEIGHT: 65 IN | DIASTOLIC BLOOD PRESSURE: 87 MMHG | TEMPERATURE: 98 F | OXYGEN SATURATION: 100 % | SYSTOLIC BLOOD PRESSURE: 145 MMHG | WEIGHT: 240 LBS | BODY MASS INDEX: 39.99 KG/M2

## 2024-05-16 DIAGNOSIS — M53.3 SACROILIAC JOINT PAIN: Primary | ICD-10-CM

## 2024-05-16 DIAGNOSIS — M46.1 SACROILIITIS: ICD-10-CM

## 2024-05-16 DIAGNOSIS — M70.61 TROCHANTERIC BURSITIS OF BOTH HIPS: ICD-10-CM

## 2024-05-16 DIAGNOSIS — G89.29 CHRONIC PAIN: ICD-10-CM

## 2024-05-16 DIAGNOSIS — M70.62 TROCHANTERIC BURSITIS OF BOTH HIPS: ICD-10-CM

## 2024-05-16 LAB — POCT GLUCOSE: 104 MG/DL (ref 70–110)

## 2024-05-16 PROCEDURE — 27096 INJECT SACROILIAC JOINT: CPT | Mod: 50 | Performed by: ANESTHESIOLOGY

## 2024-05-16 PROCEDURE — 63600175 PHARM REV CODE 636 W HCPCS: Mod: JZ,JG | Performed by: ANESTHESIOLOGY

## 2024-05-16 PROCEDURE — 82947 ASSAY GLUCOSE BLOOD QUANT: CPT | Performed by: ANESTHESIOLOGY

## 2024-05-16 PROCEDURE — 20610 DRAIN/INJ JOINT/BURSA W/O US: CPT | Mod: 50,59 | Performed by: ANESTHESIOLOGY

## 2024-05-16 PROCEDURE — 25000003 PHARM REV CODE 250: Performed by: ANESTHESIOLOGY

## 2024-05-16 PROCEDURE — 27096 INJECT SACROILIAC JOINT: CPT | Mod: 50,,, | Performed by: ANESTHESIOLOGY

## 2024-05-16 PROCEDURE — 20610 DRAIN/INJ JOINT/BURSA W/O US: CPT | Mod: 50,59,, | Performed by: ANESTHESIOLOGY

## 2024-05-16 PROCEDURE — 25500020 PHARM REV CODE 255: Performed by: ANESTHESIOLOGY

## 2024-05-16 RX ORDER — BUPIVACAINE HYDROCHLORIDE 2.5 MG/ML
INJECTION, SOLUTION EPIDURAL; INFILTRATION; INTRACAUDAL
Status: DISCONTINUED | OUTPATIENT
Start: 2024-05-16 | End: 2024-05-16 | Stop reason: HOSPADM

## 2024-05-16 RX ORDER — SODIUM CHLORIDE 9 MG/ML
INJECTION, SOLUTION INTRAVENOUS CONTINUOUS
Status: ACTIVE | OUTPATIENT
Start: 2024-05-16

## 2024-05-16 RX ORDER — LIDOCAINE HYDROCHLORIDE 20 MG/ML
INJECTION, SOLUTION INFILTRATION; PERINEURAL
Status: DISCONTINUED | OUTPATIENT
Start: 2024-05-16 | End: 2024-05-16 | Stop reason: HOSPADM

## 2024-05-16 RX ORDER — ALPRAZOLAM 0.5 MG/1
1 TABLET, ORALLY DISINTEGRATING ORAL ONCE
Status: COMPLETED | OUTPATIENT
Start: 2024-05-16 | End: 2024-05-16

## 2024-05-16 RX ORDER — TRIAMCINOLONE ACETONIDE 40 MG/ML
INJECTION, SUSPENSION INTRA-ARTICULAR; INTRAMUSCULAR
Status: DISCONTINUED | OUTPATIENT
Start: 2024-05-16 | End: 2024-05-16 | Stop reason: HOSPADM

## 2024-05-16 RX ADMIN — ALPRAZOLAM 1 MG: 0.5 TABLET, ORALLY DISINTEGRATING ORAL at 11:05

## 2024-05-16 NOTE — PLAN OF CARE
Pt denies pain or discomfort @ this time. Pt states she is ready to be discharged home @ this time. Discharge instructions reviewed with patient, with time allotted for questions. Pt verbalizes understanding of instructions and states they have no further questions @ this time. Procedure site is clean, dry and intact. Band-aids in place. Vital signs are stable. No acute distress noted. Staff is at bedside to assist patient. Wheeled to discharge area. Home with family in private vehicle.

## 2024-05-16 NOTE — OP NOTE
Sacroiliac Joint and Greater Trochanteric Bursa Injection under Fluoroscopic Guidance    The procedure, risks, benefits, and options were discussed with the patient. There are no contraindications to the procedure. The patent expressed understanding and agreed to the procedure. Informed written consent was obtained prior to the start of the procedure and can be found in the patient's chart.    PATIENT NAME: Alivia Thomas   MRN: [id number]     DATE OF PROCEDURE: 05/16/2024    PROCEDURE:   Bilateral Sacroiliac Joint Injection under Fluoroscopic Guidance  Bilateral Greater Trochanteric Bursa Injection under Fluoroscopic Guidance    PRE-OP DIAGNOSIS: Sacroiliac joint pain [M53.3]  Greater trochanteric bursitis of both hips [M70.61, M70.62]    POST-OP DIAGNOSIS: Same    PHYSICIAN: Israel Hurtado MD    ASSISTANTS: Ciro Chung MD Fellow & Santos Barron MD  PM&R     MEDICATIONS INJECTED: Preservative-free Kenalog 40mg with 7cc of Bupivacine 0.25%     LOCAL ANESTHETIC INJECTED: Xylocaine 2%     SEDATION: None    ESTIMATED BLOOD LOSS: None    COMPLICATIONS: None    TECHNIQUE: Time-out was performed to identify the patient and procedure to be performed. With the patient laying in a prone position, the surgical area was prepped and draped in the usual sterile fashion using ChloraPrep and a fenestrated drape. The sacroiliac joint was determined under fluoroscopy guidance. Skin anesthesia was achieved by injecting Lidocaine 2% over the injection site. The sacroiliac joint was then approached with a 22 gauge,  3.5 inch spinal quinke needle that was introduced under fluoroscopic guidance in the AP and Lateral views. Once the needle tip was in the area of the joint, and there was no blood aspiration, contrast dye contrast dye Omnipaque (300mg/mL) was injected to confirm placement and there was no vascular runoff. Fluoroscopic imaging in the AP and lateral views revealed a clear outline of the joint space. 4 mL of the medication  mixture listed above was injected slowly intraarticular and ivy-articular. Displacement of the radio opaque contrast after injection of the medication confirmed that the medication went into the area of the joint. The needles were removed and bleeding was nil. A sterile dressing was applied. No specimens collected. The patient tolerated the procedure well.     TECHNIQUE: Time-out was performed to identify the patient and procedure to be performed. With the patient laying in a prone position, the surgical area was prepped and draped in the usual sterile fashion using ChloraPrep and a fenestrated drape. The area overlying the greater trochanteric bursa was determined under fluoroscopy guidance. Skin anesthesia was achieved by injecting Lidocaine 2% over the injection site. The greater trochanteric bursa was then approached with a 22 gauge, 5 inch spinal quinke needle that was introduced under fluoroscopic guidance in the AP view. Once the needle tip was in the area of the bursa, and there was no blood aspiration,  Contrast dye  Omnipaque (300mg/mL) was injected to confirm placement and there was no vascular runoff. 4 mL of the medication mixture listed above was injected slowly. The needles were removed and bleeding was nil. A sterile dressing was applied. No specimens collected. The patient tolerated the procedure well.    PRE-PROCEDURE PAIN SCORE: 8-10/10    POST-PROCEDURE PAIN SCORE: 0/10    The patient was monitored after the procedure in the recovery area. They were given post-procedure and discharge instructions to follow at home. The patient was discharged in a stable condition.        Israel Hurtado MD

## 2024-05-16 NOTE — DISCHARGE INSTRUCTIONS

## 2024-05-16 NOTE — DISCHARGE SUMMARY
Discharge Note  Short Stay      SUMMARY     Admit Date: 5/16/2024    Attending Physician: Israel Hurtado      Discharge Physician: Israel Hurtado      Discharge Date: 5/16/2024 12:21 PM    Procedure(s) (LRB):  INJECTION, JOINT BILATERAL SI AND GTB (Bilateral)    Final Diagnosis: Sacroiliac joint pain [M53.3]  Greater trochanteric bursitis of both hips [M70.61, M70.62]    Disposition: Home or self care    Patient Instructions:   Current Discharge Medication List        CONTINUE these medications which have NOT CHANGED    Details   albuterol (VENTOLIN HFA) 90 mcg/actuation inhaler INHALE TWO PUFFS INTO LUNGS EVERY 4 TO 6 HOURS AS NEEDED FOR SHORTNESS OF BREATH AND FOR WHEEZING  Qty: 18 g, Refills: 1    Comments: Please consider 90 day supplies to promote better adherence  Associated Diagnoses: Asthma, well controlled, mild intermittent      allopurinoL (ZYLOPRIM) 300 MG tablet Take 1 tablet (300 mg total) by mouth 2 (two) times daily.  Qty: 180 tablet, Refills: 3    Associated Diagnoses: Gout, unspecified cause, unspecified chronicity, unspecified site      apixaban (ELIQUIS) 5 mg Tab Take 1 tablet (5 mg total) by mouth 2 (two) times daily.  Qty: 60 tablet, Refills: 6      atorvastatin (LIPITOR) 80 MG tablet Take 1 tablet (80 mg total) by mouth once daily.  Qty: 90 tablet, Refills: 3      b complex vitamins tablet Take 1 tablet by mouth every other day.       calcium citrate/vitamin D2 (ISIAH-CITRATE ORAL) Take 500 mg by mouth 2 (two) times daily.      colchicine, gout, (MITIGARE) 0.6 mg Cap TAKE 1 CAPSULE (0.6 MG TOTAL) BY MOUTH DAILY AS NEEDED (FLARE UP).  Qty: 90 capsule, Refills: 1    Associated Diagnoses: Gout, unspecified cause, unspecified chronicity, unspecified site      cyanocobalamin (VITAMIN B-12) 1000 MCG tablet Take 100 mcg by mouth every morning.      diclofenac sodium (VOLTAREN) 1 % Gel Apply 2 g topically 4 (four) times daily as needed. To painful area on the feet.  Qty: 500 g, Refills: 5    Comments: 5 x  100g tubes  Associated Diagnoses: Right foot pain; Enthesopathy of foot      FLUoxetine 40 MG capsule Take 1 capsule (40 mg total) by mouth once daily.  Qty: 30 capsule, Refills: 0      fluticasone propionate (FLONASE) 50 mcg/actuation nasal spray 1 SPRAY BY EACH NOSTRIL ROUTE 2 TIMES DAILY AS NEEDED FOR RHINITIS.  Qty: 48 g, Refills: 3      indomethacin (INDOCIN) 50 MG capsule Take 1 capsule (50 mg total) by mouth 2 (two) times daily as needed (with meals).  Qty: 20 capsule, Refills: 0    Associated Diagnoses: Left foot pain; Gout of left foot, unspecified cause, unspecified chronicity      LIDOcaine (XYLOCAINE) 5 % Oint ointment APPLY TOPICALLY AS NEEDED.  Qty: 35.44 g, Refills: 6    Associated Diagnoses: Right foot pain      metoprolol succinate (TOPROL-XL) 25 MG 24 hr tablet Take 1 tablet (25 mg total) by mouth once daily.  Qty: 30 tablet, Refills: 11    Comments: .      MULTIVIT-IRON-MIN-FOLIC ACID 3,500-18-0.4 UNIT-MG-MG ORAL CHEW Take 1 tablet by mouth 2 (two) times daily.       nystatin (MYCOSTATIN) powder Apply topically 2 (two) times daily.  Qty: 60 g, Refills: 3      omeprazole (PRILOSEC) 20 MG capsule TAKE 1 CAPSULE (20 MG TOTAL) BY MOUTH EVERY MORNING.  Qty: 90 capsule, Refills: 3    Associated Diagnoses: Gastroesophageal reflux disease without esophagitis      oxybutynin (DITROPAN-XL) 5 MG TR24 Take 1 tablet (5 mg total) by mouth once daily.  Qty: 90 tablet, Refills: 3    Associated Diagnoses: Mixed incontinence urge and stress (male)(female)      oxyCODONE-acetaminophen (PERCOCET)  mg per tablet Take 1 tablet by mouth every 8 (eight) hours as needed for Pain.  Qty: 90 tablet, Refills: 0    Comments: Quantity prescribed more than 7 day supply? Yes, quantity medically necessary  Associated Diagnoses: Chronic pain syndrome; Spondylosis of lumbar region without myelopathy or radiculopathy; DDD (degenerative disc disease), lumbar; Chronic pain of both knees; Primary osteoarthritis of both knees       prednisoLONE acetate (PRED FORTE) 1 % DrpS Place 1 drop into the right eye 3 (three) times daily.      tirzepatide (MOUNJARO) 2.5 mg/0.5 mL PnIj Inject 2.5 mg into the skin once a week.  Qty: 4 Pen, Refills: 1      tirzepatide (MOUNJARO) 5 mg/0.5 mL PnIj Inject 5 mg into the skin every 7 days.  Qty: 4 Pen, Refills: 1      tiZANidine (ZANAFLEX) 4 MG tablet TAKE 1 TABLET (4 MG TOTAL) BY MOUTH EVERY 8 (EIGHT) HOURS AS NEEDED (MUSCLE PAIN).  Qty: 90 tablet, Refills: 2      tretinoin microspheres (RETIN-A MICRO PUMP) 0.08 % GlwP Apply to affected area daily  Qty: 50 g, Refills: 0    Associated Diagnoses: Plantar wart      urea (CARMOL) 40 % Crea Apply topically once daily. To dry skin on the feet.  Qty: 120 g, Refills: 5    Associated Diagnoses: Dry skin      vitamin D 185 MG Tab Take 5,000 mg by mouth every morning.                  Discharge Diagnosis: Sacroiliac joint pain [M53.3]  Greater trochanteric bursitis of both hips [M70.61, M70.62]  Condition on Discharge: Stable with no complications to procedure   Diet on Discharge: Same as before.  Activity: as per instruction sheet.  Discharge to: Home with a responsible adult.  Follow up: 2-4 weeks       Please call my office or pager at [phone] if experienced any weakness or loss of sensation, fever > 101.5, pain uncontrolled with oral medications, persistent nausea/vomiting/or diarrhea, redness or drainage from the incisions, or any other worrisome concerns. If physician on call was not reached or could not communicate with our office for any reason please go to the nearest emergency department

## 2024-05-21 ENCOUNTER — OFFICE VISIT (OUTPATIENT)
Dept: INTERNAL MEDICINE | Facility: CLINIC | Age: 59
End: 2024-05-21
Payer: MEDICARE

## 2024-05-21 ENCOUNTER — OFFICE VISIT (OUTPATIENT)
Dept: NEUROLOGY | Facility: CLINIC | Age: 59
End: 2024-05-21
Payer: MEDICARE

## 2024-05-21 ENCOUNTER — PATIENT MESSAGE (OUTPATIENT)
Dept: ADMINISTRATIVE | Facility: OTHER | Age: 59
End: 2024-05-21
Payer: MEDICARE

## 2024-05-21 VITALS
DIASTOLIC BLOOD PRESSURE: 70 MMHG | HEIGHT: 65 IN | HEART RATE: 76 BPM | SYSTOLIC BLOOD PRESSURE: 134 MMHG | OXYGEN SATURATION: 99 % | BODY MASS INDEX: 42.37 KG/M2 | WEIGHT: 254.31 LBS

## 2024-05-21 VITALS
HEART RATE: 67 BPM | SYSTOLIC BLOOD PRESSURE: 109 MMHG | HEIGHT: 65 IN | WEIGHT: 254 LBS | DIASTOLIC BLOOD PRESSURE: 73 MMHG | BODY MASS INDEX: 42.32 KG/M2

## 2024-05-21 DIAGNOSIS — I48.0 PAROXYSMAL ATRIAL FIBRILLATION: ICD-10-CM

## 2024-05-21 DIAGNOSIS — E11.49 TYPE II DIABETES MELLITUS WITH NEUROLOGICAL MANIFESTATIONS: Primary | ICD-10-CM

## 2024-05-21 DIAGNOSIS — Z86.73 HISTORY OF RIGHT MCA STROKE: Primary | ICD-10-CM

## 2024-05-21 DIAGNOSIS — I63.511 ACUTE RIGHT MCA STROKE: ICD-10-CM

## 2024-05-21 DIAGNOSIS — E11.42 DIABETIC POLYNEUROPATHY ASSOCIATED WITH TYPE 2 DIABETES MELLITUS: ICD-10-CM

## 2024-05-21 DIAGNOSIS — I10 HYPERTENSION, UNSPECIFIED TYPE: ICD-10-CM

## 2024-05-21 DIAGNOSIS — E66.01 MORBID OBESITY: ICD-10-CM

## 2024-05-21 DIAGNOSIS — M10.9 GOUT, UNSPECIFIED CAUSE, UNSPECIFIED CHRONICITY, UNSPECIFIED SITE: ICD-10-CM

## 2024-05-21 DIAGNOSIS — R79.89 OTHER SPECIFIED ABNORMAL FINDINGS OF BLOOD CHEMISTRY: ICD-10-CM

## 2024-05-21 DIAGNOSIS — E78.49 OTHER HYPERLIPIDEMIA: Chronic | ICD-10-CM

## 2024-05-21 PROCEDURE — G2211 COMPLEX E/M VISIT ADD ON: HCPCS | Mod: S$PBB,,, | Performed by: INTERNAL MEDICINE

## 2024-05-21 PROCEDURE — 99999 PR PBB SHADOW E&M-EST. PATIENT-LVL IV: CPT | Mod: PBBFAC,,, | Performed by: PSYCHIATRY & NEUROLOGY

## 2024-05-21 PROCEDURE — 99215 OFFICE O/P EST HI 40 MIN: CPT | Mod: S$PBB,,, | Performed by: PSYCHIATRY & NEUROLOGY

## 2024-05-21 PROCEDURE — 99999 PR PBB SHADOW E&M-EST. PATIENT-LVL V: CPT | Mod: PBBFAC,,, | Performed by: INTERNAL MEDICINE

## 2024-05-21 PROCEDURE — 99214 OFFICE O/P EST MOD 30 MIN: CPT | Mod: S$PBB,,, | Performed by: INTERNAL MEDICINE

## 2024-05-21 PROCEDURE — 99214 OFFICE O/P EST MOD 30 MIN: CPT | Mod: PBBFAC,27 | Performed by: PSYCHIATRY & NEUROLOGY

## 2024-05-21 PROCEDURE — 99215 OFFICE O/P EST HI 40 MIN: CPT | Mod: PBBFAC,27,PO | Performed by: INTERNAL MEDICINE

## 2024-05-21 RX ORDER — SEMAGLUTIDE 1.34 MG/ML
1 INJECTION, SOLUTION SUBCUTANEOUS
Qty: 3 ML | Refills: 11 | Status: SHIPPED | OUTPATIENT
Start: 2024-05-21 | End: 2025-05-21

## 2024-05-21 RX ORDER — FLUOXETINE HYDROCHLORIDE 40 MG/1
40 CAPSULE ORAL DAILY
Qty: 90 CAPSULE | Refills: 3 | Status: SHIPPED | OUTPATIENT
Start: 2024-05-21 | End: 2024-06-20

## 2024-05-21 RX ORDER — SEMAGLUTIDE 1.34 MG/ML
1 INJECTION, SOLUTION SUBCUTANEOUS
Qty: 3 ML | Refills: 11 | Status: SHIPPED | OUTPATIENT
Start: 2024-05-21 | End: 2024-05-21 | Stop reason: SDUPTHER

## 2024-05-21 RX ORDER — METOPROLOL SUCCINATE 25 MG/1
25 TABLET, EXTENDED RELEASE ORAL DAILY
Qty: 90 TABLET | Refills: 3 | Status: SHIPPED | OUTPATIENT
Start: 2024-05-21 | End: 2025-05-21

## 2024-05-21 RX ORDER — COLCHICINE 0.6 MG/1
CAPSULE ORAL
Qty: 90 CAPSULE | Refills: 1 | Status: SHIPPED | OUTPATIENT
Start: 2024-05-21

## 2024-05-21 NOTE — PROGRESS NOTES
Subjective:       Patient ID: Alivia Thomas is a 58 y.o. female.    Chief Complaint: Follow-up, Knee Pain, and Low-back Pain    Follow-up  Pertinent negatives include no abdominal pain, chest pain (arm pain or jaw pain), headaches, nausea or vomiting.   Knee Pain     Low-back Pain  Pertinent negatives include no abdominal pain, chest pain (arm pain or jaw pain), headaches, nausea or vomiting.   Pt plans TKR soon - there was a recall on her prosthesis.  No CP or SOB. No new stoke symptoms.  Pt denies any problems with anesthesia in the past. Pt reports no history of bleeding diathesis, no significant neck DJD, and no steroids in the last year.  Review of Systems   Respiratory:  Negative for shortness of breath (PND or orthopnea).    Cardiovascular:  Negative for chest pain (arm pain or jaw pain).   Gastrointestinal:  Negative for abdominal pain, diarrhea, nausea and vomiting.   Genitourinary:  Negative for dysuria.   Neurological:  Negative for seizures, syncope and headaches.       Objective:      Physical Exam  Constitutional:       General: She is not in acute distress.     Appearance: She is well-developed.   HENT:      Head: Normocephalic.   Eyes:      Pupils: Pupils are equal, round, and reactive to light.   Neck:      Thyroid: No thyromegaly.      Vascular: No JVD.   Cardiovascular:      Rate and Rhythm: Normal rate and regular rhythm.      Heart sounds: Normal heart sounds. No murmur heard.     No friction rub. No gallop.   Pulmonary:      Effort: Pulmonary effort is normal.      Breath sounds: Normal breath sounds. No wheezing or rales.   Abdominal:      General: Bowel sounds are normal. There is no distension.      Palpations: Abdomen is soft. There is no mass.      Tenderness: There is no abdominal tenderness. There is no guarding or rebound.   Musculoskeletal:      Cervical back: Neck supple.   Lymphadenopathy:      Cervical: No cervical adenopathy.   Skin:     General: Skin is warm and dry.    Neurological:      Mental Status: She is alert and oriented to person, place, and time.      Deep Tendon Reflexes: Reflexes are normal and symmetric.   Psychiatric:         Behavior: Behavior normal.         Thought Content: Thought content normal.         Judgment: Judgment normal.         Assessment:       1. Type II diabetes mellitus with neurological manifestations    2. Gout, unspecified cause, unspecified chronicity, unspecified site    3. Hypertension, unspecified type    4. Other specified abnormal findings of blood chemistry        Plan:   Type II diabetes mellitus with neurological manifestations  -     CBC Auto Differential; Future; Expected date: 05/21/2024  -     Comprehensive Metabolic Panel; Future; Expected date: 05/21/2024  -     Hemoglobin A1C; Future; Expected date: 05/21/2024  -     X-Ray Chest PA And Lateral; Future; Expected date: 05/21/2024  -     SCHEDULED EKG 12-LEAD (to Muse); Future    Gout, unspecified cause, unspecified chronicity, unspecified site  -     colchicine (MITIGARE) 0.6 mg Cap; One daily as needed for gout flare  Dispense: 90 capsule; Refill: 1  -     Uric Acid; Future; Expected date: 05/21/2024    Hypertension, unspecified type  -     Stress Echo Which stress agent will be used? Pharmacological; Color Flow Doppler? Yes; Future    Other specified abnormal findings of blood chemistry  -     Stress Echo Which stress agent will be used? Pharmacological; Color Flow Doppler? Yes; Future    Other orders  -     Discontinue: semaglutide (OZEMPIC) 1 mg/dose (4 mg/3 mL); Inject 1 mg into the skin every 7 days.  Dispense: 3 mL; Refill: 11  -     semaglutide (OZEMPIC) 1 mg/dose (4 mg/3 mL); Inject 1 mg into the skin every 7 days.  Dispense: 3 mL; Refill: 11  -     metoprolol succinate (TOPROL-XL) 25 MG 24 hr tablet; Take 1 tablet (25 mg total) by mouth once daily.  Dispense: 90 tablet; Refill: 3  -     FLUoxetine 40 MG capsule; Take 1 capsule (40 mg total) by mouth once daily.   Dispense: 90 capsule; Refill: 3

## 2024-05-21 NOTE — PROGRESS NOTES
Outpatient Neurology Consultation    Requesting physician: Dr. Richardson    Impression:  TNK aborted R hemispheric ischemic stroke (MR-negative) 1/4/24.  Etiology likely PAF  Diabetic PN    Stroke risk factors: stroke, PAF, DM, HTN, HLD, obesity, PADDY (intolerant to CPAP)    Plan:  Continue apixaban 5mg bid  Atorvastatin 80mg/d  Exercise/ continued weight loss encouraged  Diabetic foot care discussed; f/u with podiatry as planned  RTC prn    Problem List Items Addressed This Visit          Unprioritized    Hyperlipemia (Chronic)    Morbid obesity    Paroxysmal atrial fibrillation    History of right MCA stroke - Primary     Other Visit Diagnoses       Acute right MCA stroke                CC:  stroke    HPI:  59 y/o AAF here for f/u of stroke.  She had dysarthria and L hemiparesis aborted by TNK on 1/4/24.  Etiology eval was unremarkable but she was subsequently diagnosed with PAF and started on apixaban.   She is intolerant to CPAP (anxiety).    Past Medical History:   Diagnosis Date    Acute right MCA stroke 01/04/2024    Allergy     Anemia     Asthma     Bilateral shoulder bursitis     Cervical stenosis of spine     Chronic pain     DDD (degenerative disc disease), cervical     Depression 01/28/2019    Diabetes mellitus type II     DJD (degenerative joint disease) of knee 06/19/2014    Facet arthritis of lumbar region 12/17/2015    Facet syndrome 12/17/2015    GERD (gastroesophageal reflux disease)     Heartburn     Hyperlipidemia     Hypertension     Morbid obesity     Neuromuscular disorder     PADDY (obstructive sleep apnea)     Paroxysmal atrial fibrillation 03/19/2024    Proteinuria     Right carpal tunnel syndrome     Sacroiliitis 06/13/2018    Sacroiliitis     Sleep apnea     Uterine fibroid       Past Surgical History:   Procedure Laterality Date    CARPAL TUNNEL RELEASE      CARPAL TUNNEL RELEASE  1980s    left    CARPAL TUNNEL RELEASE  2012    right    CATARACT EXTRACTION W/  INTRAOCULAR LENS IMPLANT Left  08/08/2023    DR. STOKES    CATARACT EXTRACTION W/  INTRAOCULAR LENS IMPLANT Right 08/29/2023        COLONOSCOPY N/A 06/15/2020    Procedure: COLONOSCOPY;  Surgeon: Jc Koo MD;  Location: [facility] (4TH FLR);  Service: Endoscopy;  Laterality: N/A;  COVID screening on 6/13/20 at [facility]  pt updated on drop off location and no visitor policy-rb    EPIDURAL STEROID INJECTION N/A 07/24/2023    Procedure: INJECTION, STEROID, EPIDURAL, L5/S1 SOONER DATE;  Surgeon: Israel Hurtado MD;  Location: [facility];  Service: Pain Management;  Laterality: N/A;    ESOPHAGOGASTRODUODENOSCOPY N/A 03/27/2019    Procedure: EGD (ESOPHAGOGASTRODUODENOSCOPY);  Surgeon: Robbin Bar MD;  Location: [facility] (2ND FLR);  Service: Endoscopy;  Laterality: N/A;  BMI 61.3/2nd floor case/svn    INJECTION OF JOINT Bilateral 06/09/2020    Procedure: INJECTION, JOINT, BILATERAL SI;  Surgeon: Lina Wagner MD;  Location: [facility];  Service: Pain Management;  Laterality: Bilateral;  B/L SI Joint Injection    INJECTION OF JOINT Bilateral 05/11/2021    Procedure: INJECTION, JOINT, SACROILIAC (SI) need consent;  Surgeon: Lina Wagner MD;  Location: [facility] PAIN T;  Service: Pain Management;  Laterality: Bilateral;    INJECTION OF JOINT Bilateral 5/16/2024    Procedure: INJECTION, JOINT BILATERAL SI AND GTB;  Surgeon: Israel Hurtado MD;  [facility];  Service: Pain Management;  Laterality: Bilateral;  [phone]  2 WK F/U BENJI    JOINT REPLACEMENT Bilateral     with 2 revisions on rt    KNEE SURGERY  03/2010    orthroscope    KNEE SURGERY  06/19/2014    left TKR    LAPAROSCOPIC SLEEVE GASTRECTOMY N/A 08/28/2019    Procedure: GASTRECTOMY, SLEEVE, LAPAROSCOPIC with intraop EGD;  Surgeon: Heriberto Clements MD;  Location: [facility] OR 2ND FLR;  Service: General;  Laterality: N/A;    PANNICULECTOMY N/A 06/14/2022    Procedure: PANNICULECTOMY;  Surgeon: Rhett Gray MD;  Location: [facility]  OR 2ND FLR;  Service: Plastics;  Laterality: N/A;    RADIOFREQUENCY ABLATION Right 07/09/2019    Procedure: RADIOFREQUENCY ABLATION;  Surgeon: Lina Wagner MD;  Location: [facility] PAIN MGT;  Service: Pain Management;  Laterality: Right;  RIGHT RFA L2,3,4,5  2 of 2    RADIOFREQUENCY ABLATION Left 12/19/2019    Procedure: RADIOFREQUENCY ABLATION, LEFT L2-L3-L4-L5 MEDIAL BRANCH 1 OF 2;  Surgeon: Lina Wagner MD;  Location: [facility] PAIN MGT;  Service: Pain Management;  Laterality: Left;    RADIOFREQUENCY ABLATION Right 12/31/2019    Procedure: RADIOFREQUENCY ABLATION, RIGHT L2-L3-L4-L5  2 OF 2;  Surgeon: Lina Wagner MD;  Location: [facility] PAIN MGT;  Service: Pain Management;  Laterality: Right;    RADIOFREQUENCY ABLATION Right 10/13/2020    Procedure: RADIOFREQUENCY ABLATION, Genicular Cooled 1 of 2;  Surgeon: Lina Wagner MD;  Location: [facility] PAIN MGT;  Service: Pain Management;  Laterality: Right;    RADIOFREQUENCY ABLATION Left 11/03/2020    Procedure: RADIOFREQUENCY ABLATION, GENICULAR COOLED  2 OF 2;  Surgeon: Lina Wagner MD;  Location: [facility] PAIN MGT;  Service: Pain Management;  Laterality: Left;    RADIOFREQUENCY ABLATION Left 12/15/2020    Procedure: RADIOFREQUENCY ABLATION LEFT L2,3,4,5 1 of 2;  Surgeon: Lina Wagner MD;  Location: [facility] PAIN MGT;  Service: Pain Management;  Laterality: Left;    RADIOFREQUENCY ABLATION Right 12/29/2020    Procedure: RADIOFREQUENCY ABLATION RIGHT L2,3,4,5 2 of 2;  Surgeon: Lina Wagner MD;  Location: [facility] PAIN MGT;  Service: Pain Management;  Laterality: Right;    RADIOFREQUENCY ABLATION Left 06/29/2021    Procedure: RADIOFREQUENCY ABLATION GENICULAR COOLED;  Surgeon: Lina Wagner MD;  Location: [facility] PAIN MGT;  Service: Pain Management;  Laterality: Left;  1 of 2    RADIOFREQUENCY ABLATION Right 07/13/2021    Procedure: RADIOFREQUENCY ABLATION GENICULAR;  Surgeon: Lina Wagner MD;  Location: [facility] PAIN MGT;  Service: Pain Management;   Laterality: Right;  2 of 2    RADIOFREQUENCY ABLATION Right 08/24/2021    Procedure: RADIOFREQUENCY ABLATION, L2-L3-L4-L5 MEDIAL BRANCH 1 OF 2;  Surgeon: Lina Wagner MD;  Location: [facility] PAIN MGT;  Service: Pain Management;  Laterality: Right;    RADIOFREQUENCY ABLATION Left 09/11/2021    Procedure: RADIOFREQUENCY ABLATION, L2-L3-L4-L5 MEDIAL BR ANCH 2 OF 2;  Surgeon: Lina Wagner MD;  Location: [facility] PAIN MGT;  Service: Pain Management;  Laterality: Left;    RADIOFREQUENCY ABLATION Right 03/07/2022    Procedure: RADIOFREQUENCY ABLATION RIGHT L3,4,5 1 of 2, needs consent;  Surgeon: Israel Hurtado MD;  Location: [facility] PAIN MGT;  Service: Pain Management;  Laterality: Right;    RADIOFREQUENCY ABLATION Left 03/21/2022    Procedure: RADIOFREQUENCY ABLATION RIGHT L3,4,5 2 of 2, needs consent;  Surgeon: Israel Hurtado MD;  Location: [facility] PAIN MGT;  Service: Pain Management;  Laterality: Left;    RADIOFREQUENCY ABLATION Right 05/09/2022    Procedure: RADIOFREQUENCY ABLATION RIGHT GENICULAR COOLED 1 of 2;  Surgeon: Israel Hurtado MD;  Location: [facility] PAIN MGT;  Service: Pain Management;  Laterality: Right;    RADIOFREQUENCY ABLATION Left 05/23/2022    Procedure: RADIOFREQUENCY ABLATION LEFT GENICULAR COOLED  2 of 2;  Surgeon: Israel Hurtado MD;  Location: [facility] PAIN MGT;  Service: Pain Management;  Laterality: Left;    RADIOFREQUENCY ABLATION Right 12/12/2022    Procedure: RADIOFREQUENCY ABLATION RIGHT GENICULAR COOLED  ONE OF TWO  NEEDS CONSENT;  Surgeon: Israel Hurtado MD;  Location: [facility] PAIN MGT;  Service: Pain Management;  Laterality: Right;    RADIOFREQUENCY ABLATION Left 01/09/2023    Procedure: RADIOFREQUENCY ABLATION LEFT GENICULAR COOLED  TWO OF TWO NEEDS CONSENT;  Surgeon: Israel Hurtado MD;  Location: [facility] PAIN MGT;  Service: Pain Management;  Laterality: Left;    RADIOFREQUENCY ABLATION Right 03/06/2023    Procedure: RADIOFREQUENCY ABLATION RIGHT L3,L4,L5;  Surgeon: Israel Hurtado MD;  Location: [facility] PAIN MGT;   Service: Pain Management;  Laterality: Right;    RADIOFREQUENCY ABLATION Left 05/22/2023    Procedure: RADIOFREQUENCY ABLATION LEFT L3,L4,L5;  Surgeon: Israel Hurtado MD;  Location: [facility] PAIN MGT;  Service: Pain Management;  Laterality: Left;  4/3 LM TO R/S    RADIOFREQUENCY ABLATION Right 11/27/2023    Procedure: RADIOFREQUENCY ABLATION RIGHT L3, 4, 5 1 OF 3;  Surgeon: Israel Hurtado MD;  Location: [facility] PAIN MGT;  Service: Pain Management;  Laterality: Right;    RADIOFREQUENCY ABLATION Right 01/22/2024    Procedure: RADIOFREQUENCY ABLATION RIGHT GENICULAR 3 NERVES 3 OF 3;  Surgeon: Israel Hurtado MD;  Location: [facility] PAIN MGT;  Service: Pain Management;  Laterality: Right;    RADIOFREQUENCY ABLATION Left 02/12/2024    Procedure: RADIOFREQUENCY ABLATION LEFT L3, 4, 5;  Surgeon: Israel Hurtado MD;  Location: [facility] PAIN MGT;  Service: Pain Management;  Laterality: Left;  [phone]    RADIOFREQUENCY ABLATION OF LUMBAR MEDIAL BRANCH NERVE AT SINGLE LEVEL Left 06/26/2018    Procedure: RADIOFREQUENCY ABLATION, NERVE, MEDIAL BRANCH, LUMBAR, 1 LEVEL;  Surgeon: Lina Wagner MD;  Location: [facility] PAIN MGT;  Service: Pain Management;  Laterality: Left;  Left Cooled RFA @ L2,3,4,5  [phone]  with Sedation    1 of 2    RADIOFREQUENCY ABLATION OF LUMBAR MEDIAL BRANCH NERVE AT SINGLE LEVEL Right 07/10/2018    Procedure: RADIOFREQUENCY ABLATION, NERVE, MEDIAL BRANCH, LUMBAR, 1 LEVEL;  Surgeon: Lina Wagner MD;  Location: [facility] PAIN MGT;  Service: Pain Management;  Laterality: Right;  RIght Cooled RFA @ L2,3,4,5  [id number] with Sedation    2 of 2    TRIGGER FINGER RELEASE Right 2017    TRIGGER FINGER RELEASE Left 07/29/2019    Procedure: RELEASE, TRIGGER FINGER, Left Thumb;  Surgeon: Velma Garcia MD;  Location: [facility] OR;  Service: Orthopedics;  Laterality: Left;  Local w/ MAC      Outpatient Medications Marked as Taking for the 5/21/24 encounter (Office Visit) with Beltran Hathaway MD   Medication Sig Dispense  Refill    albuterol (VENTOLIN HFA) 90 mcg/actuation inhaler INHALE TWO PUFFS INTO LUNGS EVERY 4 TO 6 HOURS AS NEEDED FOR SHORTNESS OF BREATH AND FOR WHEEZING 18 g 1    allopurinoL (ZYLOPRIM) 300 MG tablet Take 1 tablet (300 mg total) by mouth 2 (two) times daily. 180 tablet 3    apixaban (ELIQUIS) 5 mg Tab Take 1 tablet (5 mg total) by mouth 2 (two) times daily. 60 tablet 6    atorvastatin (LIPITOR) 80 MG tablet Take 1 tablet (80 mg total) by mouth once daily. 90 tablet 3    b complex vitamins tablet Take 1 tablet by mouth every other day.       calcium citrate/vitamin D2 (ISIAH-CITRATE ORAL) Take 500 mg by mouth 2 (two) times daily.      colchicine, gout, (MITIGARE) 0.6 mg Cap TAKE 1 CAPSULE (0.6 MG TOTAL) BY MOUTH DAILY AS NEEDED (FLARE UP). 90 capsule 1    cyanocobalamin (VITAMIN B-12) 1000 MCG tablet Take 100 mcg by mouth every morning.      diclofenac sodium (VOLTAREN) 1 % Gel Apply 2 g topically 4 (four) times daily as needed. To painful area on the feet. 500 g 5    fluticasone propionate (FLONASE) 50 mcg/actuation nasal spray 1 SPRAY BY EACH NOSTRIL ROUTE 2 TIMES DAILY AS NEEDED FOR RHINITIS. 48 g 3    LIDOcaine (XYLOCAINE) 5 % Oint ointment APPLY TOPICALLY AS NEEDED. 35.44 g 6    metoprolol succinate (TOPROL-XL) 25 MG 24 hr tablet Take 1 tablet (25 mg total) by mouth once daily. 30 tablet 11    MULTIVIT-IRON-MIN-FOLIC ACID 3,500-18-0.4 UNIT-MG-MG ORAL CHEW Take 1 tablet by mouth 2 (two) times daily.       nystatin (MYCOSTATIN) powder Apply topically 2 (two) times daily. 60 g 3    omeprazole (PRILOSEC) 20 MG capsule TAKE 1 CAPSULE (20 MG TOTAL) BY MOUTH EVERY MORNING. 90 capsule 3    oxybutynin (DITROPAN-XL) 5 MG TR24 Take 1 tablet (5 mg total) by mouth once daily. 90 tablet 3    oxyCODONE-acetaminophen (PERCOCET)  mg per tablet Take 1 tablet by mouth every 8 (eight) hours as needed for Pain. 90 tablet 0    prednisoLONE acetate (PRED FORTE) 1 % DrpS Place 1 drop into the right eye 3 (three) times daily.       tirzepatide (MOUNJARO) 2.5 mg/0.5 mL PnIj Inject 2.5 mg into the skin once a week. 4 Pen 1    tirzepatide (MOUNJARO) 5 mg/0.5 mL PnIj Inject 5 mg into the skin every 7 days. 4 Pen 1    tiZANidine (ZANAFLEX) 4 MG tablet TAKE 1 TABLET (4 MG TOTAL) BY MOUTH EVERY 8 (EIGHT) HOURS AS NEEDED (MUSCLE PAIN). 90 tablet 2    tretinoin microspheres (RETIN-A MICRO PUMP) 0.08 % GlwP Apply to affected area daily 50 g 0    urea (CARMOL) 40 % Crea Apply topically once daily. To dry skin on the feet. 120 g 5    vitamin D 185 MG Tab Take 5,000 mg by mouth every morning.         Review of patient's allergies indicates:   Allergen Reactions    Strawberry Anaphylaxis      Family History   Problem Relation Name Age of Onset    Diabetes Mother      Cataracts Mother      Diabetes Father      Cataracts Father      Hypertension Sister      Diabetes Sister      No Known Problems Sister      Cancer Sister          lymphoma     Coronary artery disease Brother      Diabetes Brother      Diabetes Brother      Hypotension Brother      Kidney failure Brother      Hypotension Brother      Amblyopia Neg Hx      Blindness Neg Hx      Glaucoma Neg Hx      Macular degeneration Neg Hx      Retinal detachment Neg Hx      Strabismus Neg Hx      Stroke Neg Hx      Thyroid disease Neg Hx      Anesthesia problems Neg Hx        Social History     Socioeconomic History    Marital status:    Tobacco Use    Smoking status: Never    Smokeless tobacco: Never   Substance and Sexual Activity    Alcohol use: Not Currently     Comment: occasionally     Drug use: No    Sexual activity: Yes     Partners: Female     Birth control/protection: None   Social History Narrative    Disabled. The patient is the youngest of 6 siblings. Single. Lives with single-sex partner.                  Social Determinants of Health     Financial Resource Strain: Medium Risk (1/17/2024)    Overall Financial Resource Strain (CARDIA)     Difficulty of Paying Living Expenses: Somewhat  "hard   Food Insecurity: No Food Insecurity (1/17/2024)    Hunger Vital Sign     Worried About Running Out of Food in the Last Year: Never true     Ran Out of Food in the Last Year: Never true   Transportation Needs: No Transportation Needs (1/17/2024)    PRAPARE - Transportation     Lack of Transportation (Medical): No     Lack of Transportation (Non-Medical): No   Physical Activity: Insufficiently Active (1/17/2024)    Exercise Vital Sign     Days of Exercise per Week: 4 days     Minutes of Exercise per Session: 30 min   Stress: Stress Concern Present (1/17/2024)    [facility]ational [facility] - Occupational Stress Questionnaire     Feeling of Stress : To some extent   Housing Stability: High Risk (1/17/2024)    Housing Stability Vital Sign     Unable to Pay for Housing in the Last Year: Yes     Number of Places Lived in the Last Year: 1     Unstable Housing in the Last Year: No       /73 (BP Location: Left arm, Patient Position: Sitting, BP Method: Large (Automatic))   Pulse 67   Ht 5' 5" (1.651 m)   Wt 115.2 kg (253 lb 15.5 oz)   LMP 06/26/2018   BMI 42.26 kg/m²    Well developed, well nourished female  Extremities: no edema    Mental status:   Awake, alert and appropriately oriented   Normal recent and remote memory   Normal attention and concentration   Normal speech and language   Normal fund of knowledge   No extinction  Cranial nerves:   PERRL   EOMF without nystagmus   VFF   Normal facial sensation   Normal facial movements (mild L ptosis- chronic)   Intact hearing bilaterally   Palate elevates symmetrically   Normal SCM and trapezius strength   Tongue midline  Motor:   No pronator drift   Normal FF movements bilaterally   Normal muscle tone, bulk and power   No abnormal movements  Sensory   Intact to LT   Glove-stocking temp  DTRs   1+ and symmetric    Plantar responses NT  Coordination   Intact to FNF and HTS  Gait   Wide-based, steady    Data Reviewed:    Lab Results   Component " Value Date    LDLCALC 121.8 03/18/2024     Lab Results   Component Value Date    HGBA1C 6.2 (H) 01/05/2024         Beltran Hathaway MD

## 2024-05-27 ENCOUNTER — OFFICE VISIT (OUTPATIENT)
Dept: ORTHOPEDICS | Facility: CLINIC | Age: 59
End: 2024-05-27
Payer: MEDICARE

## 2024-05-27 VITALS — HEIGHT: 65 IN | WEIGHT: 253.5 LBS | BODY MASS INDEX: 42.24 KG/M2

## 2024-05-27 DIAGNOSIS — E66.01 MORBID OBESITY WITH BMI OF 40.0-44.9, ADULT: ICD-10-CM

## 2024-05-27 DIAGNOSIS — G89.29 CHRONIC PAIN OF RIGHT KNEE: ICD-10-CM

## 2024-05-27 DIAGNOSIS — T84.018S FAILURE OF TOTAL KNEE REPLACEMENT, SEQUELA: Primary | ICD-10-CM

## 2024-05-27 DIAGNOSIS — M25.561 CHRONIC PAIN OF RIGHT KNEE: ICD-10-CM

## 2024-05-27 DIAGNOSIS — Z96.659 FAILURE OF TOTAL KNEE REPLACEMENT, SEQUELA: Primary | ICD-10-CM

## 2024-05-27 PROCEDURE — 99214 OFFICE O/P EST MOD 30 MIN: CPT | Mod: S$PBB,,, | Performed by: ORTHOPAEDIC SURGERY

## 2024-05-27 PROCEDURE — 99999 PR PBB SHADOW E&M-EST. PATIENT-LVL III: CPT | Mod: PBBFAC,,, | Performed by: ORTHOPAEDIC SURGERY

## 2024-05-27 PROCEDURE — 99213 OFFICE O/P EST LOW 20 MIN: CPT | Mod: PBBFAC | Performed by: ORTHOPAEDIC SURGERY

## 2024-05-27 NOTE — PROGRESS NOTES
"Subjective:      Patient ID: Alivia Thomas is a 58 y.o. female.    Chief Complaint: Pain of the Left Knee and Pain of the Right Knee    HPI  Alivia Thomas has right knee pain.  Right knee is still worse than the left.  It is painful with ambulation. The symptoms have worsened to the point where it is interfering with her activities of daily living.  She has difficulty with stairs, getting dressed and getting in an out of a car.   It was a cane usually painful at rest.  There has been no other significant change.  She is continuing to lose weight.    On eliquis  Review of Systems   Constitutional: Negative for chills, fever and night sweats.   HENT:  Negative for hearing loss.    Eyes:  Negative for blurred vision and double vision.   Cardiovascular:  Negative for chest pain, claudication and leg swelling.   Respiratory:  Negative for shortness of breath.    Endocrine: Negative for polydipsia, polyphagia and polyuria.   Hematologic/Lymphatic: Negative for adenopathy and bleeding problem. Does not bruise/bleed easily.   Skin:  Negative for poor wound healing.   Gastrointestinal:  Negative for diarrhea and heartburn.   Genitourinary:  Negative for bladder incontinence.   Neurological:  Negative for focal weakness, headaches, numbness, paresthesias and sensory change.   Psychiatric/Behavioral:  The patient is not nervous/anxious.    Allergic/Immunologic: Negative for persistent infections.         Objective:      Body mass index is 42.19 kg/m².  Vitals:    05/27/24 1112   Weight: 115 kg (253 lb 8.5 oz)   Height: 5' 5" (1.651 m)           General    Constitutional: She is oriented to person, place, and time. She appears well-developed and well-nourished.   HENT:   Head: Normocephalic and atraumatic.   Eyes: EOM are normal.   Cardiovascular:  Normal rate.            Pulmonary/Chest: Effort normal.   Neurological: She is alert and oriented to person, place, and time.   Psychiatric: She has a normal mood and affect. " Her behavior is normal.     General Musculoskeletal Exam   Gait: antalgic       Right Knee Exam     Inspection   Erythema: absent  Scars: present  Swelling: present  Effusion: absent  Deformity: absent  Bruising: absent    Tenderness   The patient is tender to palpation of the medial retinaculum and lateral retinaculum.    Range of Motion   Extension:  0   Flexion:  120     Tests   Ligament Examination   Lachman: abnormal - grade II  MCL - Valgus: abnormal - grade I  LCL - Varus: abnormal - grade I    Muscle Strength   Right Lower Extremity   Hip Abduction: 5/5   Quadriceps:  5/5   Hamstrin/5     Vascular Exam       Edema  Right Lower Leg: absent    Allowing for projection the radiographs today in my opinion demonstrate progression lucency about the cement bone interface especially on the tibia.  These were x-rays of the right knee which I personally reviewed.          Assessment:       Encounter Diagnoses   Name Primary?    Failure of total knee replacement, sequela Yes    Chronic pain of right knee     Morbid obesity with BMI of 40.0-44.9, adult             Plan:       Alivia was seen today for pain and pain.    Diagnoses and all orders for this visit:    Failure of total knee replacement, sequela    Chronic pain of right knee    Morbid obesity with BMI of 40.0-44.9, adult          Treatment options were discussed. The surgical process of revision right   knee replacement was discussed in detail with the patient including a detailed discussion of the procedure itself (including visual model, x-ray review, and literature review). The typical perioperative and post-operative course was discussed and perioperative risks were discussed to the patient's satisfaction.  We discussed  implant type and why I believe the implant selected is a good match for the patient's needs. Risks and complications discussed included but were not limited to the risks of anesthetic complications, infection, bleeding, wound healing  complications, stiffness, aseptic loosening, instability, limb length inequality, neurologic dysfunction including numbness and weakness,additional surgery,  DVT, pulmonary embolism, perioperative medical risks (cardiac, pulmonary, renal, neurologic), and death and the patient elects to proceed.  The patient should get medically cleared.     Eliqu    Neuro clearance for stroke  Home care plan needs to be in place-Home health  Cefadroxcameron Alvarado

## 2024-05-29 ENCOUNTER — OFFICE VISIT (OUTPATIENT)
Dept: PAIN MEDICINE | Facility: CLINIC | Age: 59
End: 2024-05-29
Payer: MEDICARE

## 2024-05-29 VITALS
BODY MASS INDEX: 42.19 KG/M2 | OXYGEN SATURATION: 98 % | RESPIRATION RATE: 18 BRPM | DIASTOLIC BLOOD PRESSURE: 84 MMHG | SYSTOLIC BLOOD PRESSURE: 143 MMHG | HEART RATE: 63 BPM | WEIGHT: 253.5 LBS | TEMPERATURE: 99 F

## 2024-05-29 DIAGNOSIS — M17.0 PRIMARY OSTEOARTHRITIS OF BOTH KNEES: ICD-10-CM

## 2024-05-29 DIAGNOSIS — M25.561 CHRONIC PAIN OF BOTH KNEES: ICD-10-CM

## 2024-05-29 DIAGNOSIS — M51.36 DDD (DEGENERATIVE DISC DISEASE), LUMBAR: ICD-10-CM

## 2024-05-29 DIAGNOSIS — G89.4 CHRONIC PAIN SYNDROME: ICD-10-CM

## 2024-05-29 DIAGNOSIS — M70.61 GREATER TROCHANTERIC BURSITIS OF BOTH HIPS: ICD-10-CM

## 2024-05-29 DIAGNOSIS — M47.816 SPONDYLOSIS OF LUMBAR REGION WITHOUT MYELOPATHY OR RADICULOPATHY: ICD-10-CM

## 2024-05-29 DIAGNOSIS — M46.1 SACROILIITIS: ICD-10-CM

## 2024-05-29 DIAGNOSIS — M47.816 LUMBAR SPONDYLOSIS: ICD-10-CM

## 2024-05-29 DIAGNOSIS — M70.62 GREATER TROCHANTERIC BURSITIS OF BOTH HIPS: ICD-10-CM

## 2024-05-29 DIAGNOSIS — Z51.81 ENCOUNTER FOR MONITORING OPIOID MAINTENANCE THERAPY: Primary | ICD-10-CM

## 2024-05-29 DIAGNOSIS — M25.562 CHRONIC PAIN OF BOTH KNEES: ICD-10-CM

## 2024-05-29 DIAGNOSIS — Z79.891 ENCOUNTER FOR MONITORING OPIOID MAINTENANCE THERAPY: Primary | ICD-10-CM

## 2024-05-29 DIAGNOSIS — G89.29 CHRONIC PAIN OF BOTH KNEES: ICD-10-CM

## 2024-05-29 PROCEDURE — 99213 OFFICE O/P EST LOW 20 MIN: CPT | Mod: PBBFAC

## 2024-05-29 PROCEDURE — 80326 AMPHETAMINES 5 OR MORE: CPT

## 2024-05-29 PROCEDURE — 80307 DRUG TEST PRSMV CHEM ANLYZR: CPT

## 2024-05-29 PROCEDURE — 99214 OFFICE O/P EST MOD 30 MIN: CPT | Mod: S$PBB,,,

## 2024-05-29 PROCEDURE — 99999 PR PBB SHADOW E&M-EST. PATIENT-LVL III: CPT | Mod: PBBFAC,,,

## 2024-05-29 RX ORDER — OXYCODONE AND ACETAMINOPHEN 10; 325 MG/1; MG/1
1 TABLET ORAL EVERY 8 HOURS PRN
Qty: 90 TABLET | Refills: 0 | Status: SHIPPED | OUTPATIENT
Start: 2024-05-29 | End: 2024-06-18 | Stop reason: SDUPTHER

## 2024-05-29 NOTE — PROGRESS NOTES
Chronic patient Established Note (Follow up visit)           SUBJECTIVE:    Interval History 5/29/2024:  Alivia Thomas returns to clinic s/p Bilateral SIJ and GTB injections on 5/16/2024.  She reports 70% relief. She is able to complete ADLs with limited pain and her quality of life is improved. She takes Percocet 10/325 TID PRN with good relief. She denies any perceived side effects. She is going to restart water aerobics at her local pool.  She is having a right TKA revision in July.  After healing from that surgery is complete, she would like to start aquatic therapy for her back. The patient denies fever/night sweats, urinary incontinence, bowel incontinence, significant weight changes, significant motor weakness or changes, or loss of sensations. Her pain today is 4/10.    Interval History 4/29/2024:  Alivia Thomas returns today for follow-up of back pain and right knee pain.  She denies any radicular pain.  She notices bilateral lower back pain with pain into the buttocks and lateral thighs. She takes Percocet TID PRN with good relief.  She denies any perceived side effects. Her pharmacy has been out of the medication and she has been out since 3/10/2024.  The patient denies fever/night sweats, urinary incontinence, bowel incontinence, significant weight changes, significant motor weakness or changes, or loss of sensations. Her pain today is 9/10.    Interval History 1/9/2024:  The patient returns to clinic today for follow up of back pain via virtual visit. She is s/p right L3,4,5 RFA on 11/27/2023. She reports 40-50% relief at this time. She did not have left sided RFA due to illness. Of note, she had a stroke last week. She began having a headache, left sided facial drooping, and slurring of her speech. She did go to the ER where she received TPA. She reports mild weakness into her left arm. She continues to report low back pain. She denies any radicular leg pain. Her pain is worse with activity.  She also reports bilateral knee pain, worse with standing and walking. She is scheduled for genicular RFA in the next few weeks. She will reschedule left lumbar RFA. She is taking Percocet as needed with benefit. She denies any adverse effects. She denies any other health changes.      Interval History 10/24/23:  Alivia Thomas returns today for follow-up. Since last seen, they report their pain has been worsening. She last received a toradol shot instead of an epidural as she did not wish to have an epidural.  Today, she is here for follow-up with me to evaluate.  She is requesting to increase her oxycodone to 4 times a day instead of 3 times a day.  I had a long discussion with her and advised her we either change the medication but not increase it, try repeating radiofrequency ablation since it worked well for her and/or spinal cord stimulator.  Yesterday, surgery was another alternative for managing her pain.  She had a CT scan that we went over the results.  She has multilevel degenerative disease with severe facet degenerative disease and multilevel spinal and foraminal stenosis.  Yesterday, she declined the surgery.  Today she is open to repeating the radiofrequency ablation but she does not want to change the medication.  Previously, the radiofrequency ablation lasted several months up to a year.  The last time she said it lasted about 3-4 month than the pain started coming back gradually over the following 3-4 months.  Overall, she still had good relief.  She is complaining of the right knee as well.  Much less pain on the left knee.  The radicular component of her back pain is in the dorsal thighs bilaterally worse on the right negligible on the left.  No new bowel or bladder symptomatology.  No fever, chills or night sweats.    Interval History 10/23/23: Alivia Thomas returns for follow up. This is a patient of Dr. Hurtado. Her visit with [place] was cancelled last week so she was placed on my  schedule today. At her last visit she was having pain radiating down her legs, but did not want to have an epidural. They performed a Toradol shot (short term relief) and referred her to Neurosurgery. She saw Dr. Bartlett today who said either she will need SCS or decompression. On review of her CT and MRI, she has severe facet arthropathy and severe canal stenosis at L3/4 and L4/5. She notes being able to walk <1 block, after which she must sit down. She reports 8-10/10 pain and this is interfering with her life significantly.     Interval History 9/18/2023:  The patient presents today to request an injection for increased back pain. She has been having worsened pain over the past couple of weeks. She does not wish to have another KERI at this time. She is having lower back pain with radiation down the back of both legs, R>L. She has associated numbness and tingling as well as subjective weakness. No bowel or bladder incontinence. She has not seen neurosurgery. Her pain today is 10/10.    Interval History 8/14/2023:  The patient is here for follow up after procedure for back pain. She is now s/p L5/S1 IL KERI with about 50% relief. She still has lower back pain with radiation down the back of the legs, stopping at the knees. She has associated numbness to lower extremities. Her pain worsens with walking, bending and reaching upward. She has been working on home exercises and stretches without much benefit. She has some benefit with medication as needed.  She report Her pain today is 9/10.    Interval History 7/10/2023:  The patient is here today for evaluation of increased lower back pain. She has a long-standing history of back pain which is mainly axial. She has had worsened symptoms over the past couple of weeks, most severe over the past 3 days. The pain now radiates down the back of both legs with burning and numbness. She finds it difficult to stand or walk for any length of time. No bowel or bladder incontinence.  No fever or malaise. Medications are helping somewhat. She continues with home PT exercises as previously taught in PT. Her pain today is 10/10.    Interval History 4/10/2023:  The patient returns to clinic today for follow up of low back and knee pain via virtual visit. She is s/p right L3,4,5 RFA on 3/6/2023. Her left side procedure was rescheduled due to illness. This is scheduled for May 1st. She reports some relief of her right sided low back pain. She continues to report left sided low back pain. She denies any radicular leg pain. She does endorse morning stiffness. She continues to perform a home exercise routine. She is taking Percocet as needed for severe pain. She denies any other health changes.     Interval History 1/30/2023:  The patient returns to clinic today for follow up of low back and knee pain. She is s/p right genicular RFA on 12/12/2022 and left genicular RFA on 1/9/2023. She reports 60% relief of her knee pain. She reports intermittent bilateral knee pain, this is tolerable at this time. She reports increased low back pain. Her pain is constant and aching in nature. Her leg pain is much improved. Her pain is worse with moving from sitting to standing. She does endorse morning stiffness. She continues to participate in physical therapy and a home exercise routine. She continues to take Percocet as needed with benefit and without adverse effects. She denies any other health changes.     Interval History 11/22/2022:  The patient returns to clinic today for follow up of low back and knee pain. She continues to report low back pain that radiates into the posterior aspect of both legs to under her feet. Her pain is worse with moving from sitting to standing. She also endorses morning stiffness. She recently started physical therapy. She has completed one session. She continues to report bilateral knee pain. She endorses worsening pain with the cold weather. Her pain is worse with standing and  walking. She continues to take Percocet as needed with benefit and without adverse effects. She denies any other health changes. Her pain today is 8/10.    Interval History 10/5/2022:  She returns for follow-up.  Her knees are doing well.  She has some discomfort but not enough to do procedures.  Her main complaint is lumbar spine pain for the past few weeks that is radiating to the dorsal aspect of both lower extremities.  She has no red flag signs/symptoms.  No new bowel or bladder symptomatology.  The pain radiates from the back down to the plantar aspect of both feet.  It is activity related.  Rest helps some but it does not resolve the pain.  She has not been in therapy for a while.  No recent imaging.  No recent weight changes.  Otherwise, no new symptomatology.  She goes regularly to her primary care physician for health maintenance.    Interval History 8/9/2022:  Alivia Thomas presents to the clinic for a follow-up appointment for low back and knee pain. She is s/p right genicular RFA on 5/9/2022 and left genicular RFA on 5/22/2022. She reports 60% relief of her knee pain. She also had panniculectomy on 6/14/2022. She is healing well. She continues to report intermittent low back pain, worse with prolonged activity. She denies any radicular leg pain. Her pain is worse prolonged standing and walking. She continues to perform a homoe exercise routine. She continues to take Percocet as needed with benefit and without adverse effects. She denies any other health changes. Her pain today is 7/10.    Interval History 4/9/2022:  The patient returns to clinic today for follow-up bilateral L3, 4, 5 RFA last month.  She reports that she is feeling very well following the procedure and reports 60-70% pain relief.  Today, she reports that her bilateral knee pain has continued to worsen, and she would like to go ahead and repeat bilateral genicular RFA as soon as possible.  She denies any new numbness, tingling,  weakness, saddle anesthesia or bowel/bladder incontinence.    Interval History 12/28/2021:  The patient returns to clinic today for follow up via virtual visit. She continues to report bilateral knee pain. This pain is worse with prolonged standing and walking. She continues to report low back and buttock pain. She denies any radicular leg pain. She continues to report a home exercise routine. She continues to report benefit with Percocet. She denies any adverse effects. She denies any other health changes.    Pain Disability Index Review:      5/29/2024     3:08 PM 10/23/2023     3:37 PM 9/18/2023     1:25 PM   Last 3 PDI Scores   Pain Disability Index (PDI) 55 27 24       Pain Medications:  Percocet 10/325 mg TID PRN pain    Opioid Contract: yes     report:  Reviewed and consistent with medication use as prescribed.    Pain Procedures:   steroid inj and visco-supplementation (series of 3) in left knee- not helpful  3/31/14 Bilateral genicular nerve blocks  2/16/16 Bilateral L2,3,4,5 MBB  3/1/16 Bilateral L2,3,4,5 MBB   4/6/16 Left L2,3,4,5 RFA- significant relief  4/20/16 Right L2,3,4,5 RFA- significant relief  10/5/16 Left L2,3,4,5 cooled RFA  10/19/16 Right L2,3,4,5 cooled RFA  4/26/17 Left L2,3,4,5 cooled RFA  5/9/17 Right L2,3,4,5 cooled RFA  12/26/2017- Left L2,3,4,5 cooled RFA  1/9/2018- Right L2,3,4,5 cooled RFA  6/26/18 Left L2,3,4,5 cooled RFA- 60% relief  7/10/18 Right L2,3,4,5 cooled RFA- 60% relief  9/28/18 TPIs- significant relief  12/27/18 Left L2,3,4,5 RFA- 70% relief  1/29/19 Right L2,3,4,5 RFA- 70% relief  6/18/19 Left L2,3,4,5 RFA- 70% relief  7/9/19 Right L2,3,4,5 RFA- 50% relief  12/19/19 Left L2,3,4,5 RFA- 80% relief  12/31/19 Right L2,3,4,5 RFA- 50% relief  3/2/2020- Right genicular nerve block- 70% relief for one month  3/12/20 Left subacromial bursa injection- 90% relief  5/18/2020- Left genicular nerve block- 70%  6/9/2020- Bilateral SI joint injections- 50% relief  10/13/2020- Right  genicular RFA- 50% relief   11/3/2020- Left genicular RFA- 50% relief  12/15/2020- Left L2,3,4,5 RFA  12/29/2020- Right L2,3,4,5 RFA  4/26/2021- Left subacromial bursa'  5/11/2021- Bilateral SI joint injections   6/28/2021- Left genicular RFA  7/13/2021- Right genicular RFA  8/24/2021- Right L2,3,4,5 RFA  9/11/2021- Left L2,3,4,5 RFA  3/7/2022- Right L2,3,4,5 RFA  3/21/2022- Left L2,3,4,5 RFA  5/9/2022- Right genicular RFA  5/23/2022- Left genicular RFA  12/12/2022- Right genicular RFA  1/9/2023- Left genicular RFA  3/6/2023- Right L3,4,5 RFA  7/24/23 L5/S1 IL KERI- 50% relief  11/27/2023- Right L3,4,5 RFA  1/22/2024 - Right Genicular - 60% relief  2/12/2024 - Left L3, L4, L5 RFA - 60% relief   5/16/2024 - Bilateral SIJ and GTB - 70% relief    Physical Therapy/Home Exercise: yes    Imaging:   MRI Lumbar Spine 7/12/2023:  COMPARISON:  10/6/22.     FINDINGS:  Alignment: Grade 1 anterolisthesis at L3-L4 and L4-L5.  No evidence for spondylolysis.     Vertebrae: Degenerative endplate changes throughout the lower lumbar spine.  Hemangioma noted within the L5 vertebral body.  No fracture or marrow infiltrative process.     Discs: Moderate disc height loss at L3-S1.  No evidence for discitis.     Cord: Conus terminates at T12-L1 and appears unremarkable.  Cauda equina appears unremarkable.     Degenerative findings:     T12-L1: No spinal canal stenosis or neuroforaminal narrowing.     L1-L2: Mild facet arthropathy results in mild right neural foraminal narrowing.     L2-L3: Circumferential disc bulge and mild facet arthropathy result in mild effacement of the thecal sac and mild bilateral neural foraminal narrowing.     L3-L4: Uncovering of the intervertebral disc with bulge and severe facet arthropathy result in severe spinal canal stenosis and severe left, moderate right neural foraminal narrowing.     L4-L5: Uncovering of the intervertebral disc with bulge and severe facet arthropathy result in severe spinal canal  stenosis and severe left, moderate right neural foraminal narrowing.     L5-S1: Circumferential disc bulge and mild facet arthropathy.  No spinal canal stenosis or neural foraminal narrowing.     Paraspinal muscles & soft tissues: Mild paraspinal muscle atrophy.  Partially visualized markedly enlarged leiomyomatous uterus noted.  There is asymmetric enlargement of the right kidney.     Impression:     1. Multilevel advanced degenerative change of the lumbar spine.  Severe spinal canal stenosis and moderate to severe neural foraminal narrowing noted at L3-L5.  2. Partially visualized markedly enlarged leiomyomatous uterus.  Recommend further evaluation pelvic ultrasound.  3. Asymmetric enlargement of the right kidney.  Recommend further evaluation with retroperitoneal ultrasound.  This report was flagged in Epic as abnormal.    Xray Knee 3/6/2019:  FINDINGS:  Postop bilateral knee replacements.  The the the the irregularity about the left patella with soft tissue calcifications similar.  Small joint effusion.  Some irregularity of the right patella unchanged as well.     IMPRESSION:      Postop bilateral knee replacement with irregularity about the patella as above.    MRI Cervical Spine 4/24/2018:  FINDINGS:  There is reversal of the normal cervical lordosis which could be related to patient positioning versus muscle spasm.     Cervical vertebral body heights are well maintained without evidence of acute fracture or dislocation.  Marrow signal is unremarkable.     Spinal canal contents are unremarkable.  No abnormal masses or collections.     Individual levels as detailed below:     C2-3: No significant spinal canal stenosis or neuroforaminal narrowing.     C3-4: Facet arthropathy.  No significant spinal canal stenosis or neuroforaminal narrowing.     C4-5: Facet arthropathy.  Small broad-based disc osteophyte complex.  Mild right neuroforaminal narrowing.  Mild spinal canal stenosis.     C5-6: Facet arthropathy.   Uncovertebral joint spurring.  Broad-based posterior disc osteophyte complex.  Mild right neuroforaminal narrowing.  Mild spinal canal stenosis.     C6-7: Facet arthropathy.  No significant spinal canal stenosis or neuroforaminal narrowing.     C7-T1: No significant spinal canal stenosis or neuroforaminal narrowing.     Paraspinal muscles are unremarkable.  Soft tissues are intact.     IMPRESSION:      Mild multilevel cervical spondylosis with mild spinal canal stenosis and mild right neuroforaminal narrowing at C4-5 and C5-6.    Xray Lumbar Spine 9/21/2015:  Results: AP, lateral neutral, lateral flexion , lateral extension, bilateral oblique and spot views.  The alignment of the lumbar spine demonstrates a mild levoscoliosis .  11-mm anterior listhesis of L4 relative to L5 with no translational abnormalities   seen on flexion and extension views.  The vertebral body heights  are well-maintained  , mild disk space narrowing L4-L5 and L5-S1.   Mild anterior and marginal osteophyte formation seen  throughout the lumbar spine  .  The oblique views demonstrate no   definite spondylolysis.  There is facet joint osseous hypertrophy noted at L3-L4 and L4-L5.  IMPRESSION:         The Significant spondylosis of the lumbar spine with grade 1 anterior listhesis L4 relative to L5.  Facet joint osseous hypertrophy L3-L4 and L4-5.    Allergies:   Review of patient's allergies indicates:   Allergen Reactions    Strawberry Anaphylaxis       Current Medications:   Current Outpatient Medications   Medication Sig Dispense Refill    albuterol (VENTOLIN HFA) 90 mcg/actuation inhaler INHALE TWO PUFFS INTO LUNGS EVERY 4 TO 6 HOURS AS NEEDED FOR SHORTNESS OF BREATH AND FOR WHEEZING 18 g 1    allopurinoL (ZYLOPRIM) 300 MG tablet Take 1 tablet (300 mg total) by mouth 2 (two) times daily. 180 tablet 3    apixaban (ELIQUIS) 5 mg Tab Take 1 tablet (5 mg total) by mouth 2 (two) times daily. 60 tablet 6    atorvastatin (LIPITOR) 80 MG tablet Take  1 tablet (80 mg total) by mouth once daily. 90 tablet 3    b complex vitamins tablet Take 1 tablet by mouth every other day.       calcium citrate/vitamin D2 (ISIAH-CITRATE ORAL) Take 500 mg by mouth 2 (two) times daily.      colchicine (MITIGARE) 0.6 mg Cap One daily as needed for gout flare 90 capsule 1    cyanocobalamin (VITAMIN B-12) 1000 MCG tablet Take 100 mcg by mouth every morning.      diclofenac sodium (VOLTAREN) 1 % Gel Apply 2 g topically 4 (four) times daily as needed. To painful area on the feet. 500 g 5    FLUoxetine 40 MG capsule Take 1 capsule (40 mg total) by mouth once daily. 90 capsule 3    fluticasone propionate (FLONASE) 50 mcg/actuation nasal spray 1 SPRAY BY EACH NOSTRIL ROUTE 2 TIMES DAILY AS NEEDED FOR RHINITIS. 48 g 3    LIDOcaine (XYLOCAINE) 5 % Oint ointment APPLY TOPICALLY AS NEEDED. 35.44 g 6    metoprolol succinate (TOPROL-XL) 25 MG 24 hr tablet Take 1 tablet (25 mg total) by mouth once daily. 90 tablet 3    MULTIVIT-IRON-MIN-FOLIC ACID 3,500-18-0.4 UNIT-MG-MG ORAL CHEW Take 1 tablet by mouth 2 (two) times daily.       nystatin (MYCOSTATIN) powder Apply topically 2 (two) times daily. 60 g 3    omeprazole (PRILOSEC) 20 MG capsule TAKE 1 CAPSULE (20 MG TOTAL) BY MOUTH EVERY MORNING. 90 capsule 3    oxybutynin (DITROPAN-XL) 5 MG TR24 Take 1 tablet (5 mg total) by mouth once daily. 90 tablet 3    prednisoLONE acetate (PRED FORTE) 1 % DrpS Place 1 drop into the right eye 3 (three) times daily.      semaglutide (OZEMPIC) 1 mg/dose (4 mg/3 mL) Inject 1 mg into the skin every 7 days. 3 mL 11    tiZANidine (ZANAFLEX) 4 MG tablet TAKE 1 TABLET (4 MG TOTAL) BY MOUTH EVERY 8 (EIGHT) HOURS AS NEEDED (MUSCLE PAIN). 90 tablet 2    urea (CARMOL) 40 % Crea Apply topically once daily. To dry skin on the feet. 120 g 5    vitamin D 185 MG Tab Take 5,000 mg by mouth every morning.       oxyCODONE-acetaminophen (PERCOCET)  mg per tablet Take 1 tablet by mouth every 8 (eight) hours as needed for Pain.  90 tablet 0    tretinoin microspheres (RETIN-A MICRO PUMP) 0.08 % GlwP Apply to affected area daily 50 g 0     No current facility-administered medications for this visit.     Facility-Administered Medications Ordered in Other Visits   Medication Dose Route Frequency Provider Last Rate Last Admin    0.9%  NaCl infusion   Intravenous Continuous Lina Wagner MD 25 mL/hr at 12/15/20 1506 25 mL/hr at 12/15/20 1506    0.9%  NaCl infusion   Intravenous Continuous Ciro Chung MD        0.9%  NaCl infusion   Intravenous Continuous Santos Barron MD           REVIEW OF SYSTEMS:    GENERAL:  No weight loss, malaise or fevers.  HEENT:  Negative for frequent or significant headaches.  NECK:  Negative for lumps, goiter, pain and significant neck swelling.  RESPIRATORY:  Negative for cough, wheezing or shortness of breath.  CARDIOVASCULAR:  Negative for chest pain, leg swelling or palpitations. HTN  GI:  Negative for abdominal discomfort, blood in stools or black stools or change in bowel habits. GERD  MUSCULOSKELETAL:  See HPI.  SKIN:  Negative for lesions, rash, and itching.  PSYCH:  Negative for sleep disturbance, mood disorder and recent psychosocial stressors.  HEMATOLOGY/LYMPHOLOGY:  Negative for prolonged bleeding, bruising easily or swollen nodes.  NEURO:   No history of headaches, syncope, paralysis, seizures or tremors.  ENDO: Diabetes  All other reviewed and negative other than HPI.    Past Medical History:  Past Medical History:   Diagnosis Date    Acute right MCA stroke 01/04/2024    Allergy     Anemia     Asthma     Bilateral shoulder bursitis     Cervical stenosis of spine     Chronic pain     DDD (degenerative disc disease), cervical     Depression 01/28/2019    Diabetes mellitus type II     DJD (degenerative joint disease) of knee 06/19/2014    Facet arthritis of lumbar region 12/17/2015    Facet syndrome 12/17/2015    GERD (gastroesophageal reflux disease)     Heartburn     Hyperlipidemia      Hypertension     Morbid obesity     Neuromuscular disorder     PADDY (obstructive sleep apnea)     Paroxysmal atrial fibrillation 03/19/2024    Proteinuria     Right carpal tunnel syndrome     Sacroiliitis 06/13/2018    Sacroiliitis     Sleep apnea     Uterine fibroid        Past Surgical History:  Past Surgical History:   Procedure Laterality Date    CARPAL TUNNEL RELEASE      CARPAL TUNNEL RELEASE  1980s    left    CARPAL TUNNEL RELEASE  2012    right    CATARACT EXTRACTION W/  INTRAOCULAR LENS IMPLANT Left 08/08/2023    DR. STOKES    CATARACT EXTRACTION W/  INTRAOCULAR LENS IMPLANT Right 08/29/2023        COLONOSCOPY N/A 06/15/2020    Procedure: COLONOSCOPY;  Surgeon: Jc Koo MD;  Location: [facility] ENDO (4TH FLR);  Service: Endoscopy;  Laterality: N/A;  COVID screening on 6/13/20 at [facility]  pt updated on drop off location and no visitor [facility]    EPIDURAL STEROID INJECTION N/A 07/24/2023    Procedure: INJECTION, STEROID, EPIDURAL, L5/S1 SOONER DATE;  Surgeon: Israel Hurtado MD;  Location: [facility] operated [facility]GT;  Service: Pain Management;  Laterality: N/A;    ESOPHAGOGASTRODUODENOSCOPY N/A 03/27/2019    Procedure: EGD (ESOPHAGOGASTRODUODENOSCOPY);  Surgeon: Robbin Bar MD;  Location: [facility] (2ND FLR);  Service: Endoscopy;  Laterality: N/A;  BMI 61.3/2nd floor case/svn    INJECTION OF JOINT Bilateral 06/09/2020    Procedure: INJECTION, JOINT, BILATERAL SI;  Surgeon: Lina Wagner MD;  Location: [facility] operated [facility];  Service: Pain Management;  Laterality: Bilateral;  B/L SI Joint Injection    INJECTION OF JOINT Bilateral 05/11/2021    Procedure: INJECTION, JOINT, SACROILIAC (SI) need consent;  Surgeon: Lina Wagner MD;  Location: [facility] operated [facility];  Service: Pain Management;  Laterality: Bilateral;    INJECTION OF JOINT Bilateral 5/16/2024    Procedure: INJECTION, JOINT BILATERAL SI AND GTB;  Surgeon: Israel Hurtado MD;  Location: [facility] operated [facility];  Service: Pain Management;  Laterality:  Bilateral;  [phone]  2 WK F/U BENJI    JOINT REPLACEMENT Bilateral     with 2 revisions on rt    KNEE SURGERY  03/2010    orthroscope    KNEE SURGERY  06/19/2014    left TKR    LAPAROSCOPIC SLEEVE GASTRECTOMY N/A 08/28/2019    Procedure: GASTRECTOMY, SLEEVE, LAPAROSCOPIC with intraop EGD;  Surgeon: Heriberto Clements MD;  Location: [facility] OR 2ND FLR;  Service: General;  Laterality: N/A;    PANNICULECTOMY N/A 06/14/2022    Procedure: PANNICULECTOMY;  Surgeon: Rhett Gray MD;  Location: [facility] OR 2ND FLR;  Service: Plastics;  Laterality: N/A;    RADIOFREQUENCY ABLATION Right 07/09/2019    Procedure: RADIOFREQUENCY ABLATION;  Surgeon: Lina Wagner MD;  Location: [facility] PAIN MGT;  Service: Pain Management;  Laterality: Right;  RIGHT RFA L2,3,4,5  2 of 2    RADIOFREQUENCY ABLATION Left 12/19/2019    Procedure: RADIOFREQUENCY ABLATION, LEFT L2-L3-L4-L5 MEDIAL BRANCH 1 OF 2;  Surgeon: Lina Wagner MD;  Location: [facility] PAIN MGT;  Service: Pain Management;  Laterality: Left;    RADIOFREQUENCY ABLATION Right 12/31/2019    Procedure: RADIOFREQUENCY ABLATION, RIGHT L2-L3-L4-L5  2 OF 2;  Surgeon: Lina Wagner MD;  Location: [facility] PAIN MGT;  Service: Pain Management;  Laterality: Right;    RADIOFREQUENCY ABLATION Right 10/13/2020    Procedure: RADIOFREQUENCY ABLATION, Genicular Cooled 1 of 2;  Surgeon: Lina Wagner MD;  Location: [facility] PAIN MGT;  Service: Pain Management;  Laterality: Right;    RADIOFREQUENCY ABLATION Left 11/03/2020    Procedure: RADIOFREQUENCY ABLATION, GENICULAR COOLED  2 OF 2;  Surgeon: Lina Wagner MD;  Location: [facility] PAIN MGT;  Service: Pain Management;  Laterality: Left;    RADIOFREQUENCY ABLATION Left 12/15/2020    Procedure: RADIOFREQUENCY ABLATION LEFT L2,3,4,5 1 of 2;  Surgeon: Lina Wagner MD;  Location: [facility] PAIN MGT;  Service: Pain Management;  Laterality: Left;    RADIOFREQUENCY ABLATION Right 12/29/2020    Procedure: RADIOFREQUENCY ABLATION RIGHT  L2,3,4,5 2 of 2;  Surgeon: Lina Wagner MD;  Location: [facility] PAIN MGT;  Service: Pain Management;  Laterality: Right;    RADIOFREQUENCY ABLATION Left 06/29/2021    Procedure: RADIOFREQUENCY ABLATION GENICULAR COOLED;  Surgeon: Lina Wagner MD;  Location: [facility] PAIN MGT;  Service: Pain Management;  Laterality: Left;  1 of 2    RADIOFREQUENCY ABLATION Right 07/13/2021    Procedure: RADIOFREQUENCY ABLATION GENICULAR;  Surgeon: Lina aWgner MD;  Location: [facility] PAIN MGT;  Service: Pain Management;  Laterality: Right;  2 of 2    RADIOFREQUENCY ABLATION Right 08/24/2021    Procedure: RADIOFREQUENCY ABLATION, L2-L3-L4-L5 MEDIAL BRANCH 1 OF 2;  Surgeon: Lina Wagner MD;  Location: [facility] PAIN MGT;  Service: Pain Management;  Laterality: Right;    RADIOFREQUENCY ABLATION Left 09/11/2021    Procedure: RADIOFREQUENCY ABLATION, L2-L3-L4-L5 MEDIAL BR ANCH 2 OF 2;  Surgeon: Lina Wagner MD;  Location: [facility] PAIN MGT;  Service: Pain Management;  Laterality: Left;    RADIOFREQUENCY ABLATION Right 03/07/2022    Procedure: RADIOFREQUENCY ABLATION RIGHT L3,4,5 1 of 2, needs consent;  Surgeon: Israel Hurtado MD;  Location: [facility] PAIN MGT;  Service: Pain Management;  Laterality: Right;    RADIOFREQUENCY ABLATION Left 03/21/2022    Procedure: RADIOFREQUENCY ABLATION RIGHT L3,4,5 2 of 2, needs consent;  Surgeon: Israel Hurtado MD;  Location: [facility] PAIN MGT;  Service: Pain Management;  Laterality: Left;    RADIOFREQUENCY ABLATION Right 05/09/2022    Procedure: RADIOFREQUENCY ABLATION RIGHT GENICULAR COOLED 1 of 2;  Surgeon: Israel Hurtado MD;  Location: [facility] PAIN MGT;  Service: Pain Management;  Laterality: Right;    RADIOFREQUENCY ABLATION Left 05/23/2022    Procedure: RADIOFREQUENCY ABLATION LEFT GENICULAR COOLED  2 of 2;  Surgeon: Israel Hurtado MD;  Location: [facility] PAIN MGT;  Service: Pain Management;  Laterality: Left;    RADIOFREQUENCY ABLATION Right 12/12/2022    Procedure: RADIOFREQUENCY ABLATION RIGHT GENICULAR COOLED   ONE OF TWO  NEEDS CONSENT;  Surgeon: Israel Hurtado MD;  Location: [facility] PAIN MGT;  Service: Pain Management;  Laterality: Right;    RADIOFREQUENCY ABLATION Left 01/09/2023    Procedure: RADIOFREQUENCY ABLATION LEFT GENICULAR COOLED  TWO OF TWO NEEDS CONSENT;  Surgeon: Israel Hurtado MD;  Location: [facility] PAIN MGT;  Service: Pain Management;  Laterality: Left;    RADIOFREQUENCY ABLATION Right 03/06/2023    Procedure: RADIOFREQUENCY ABLATION RIGHT L3,L4,L5;  Surgeon: Israel Hurtado MD;  Location: [facility] PAIN MGT;  Service: Pain Management;  Laterality: Right;    RADIOFREQUENCY ABLATION Left 05/22/2023    Procedure: RADIOFREQUENCY ABLATION LEFT L3,L4,L5;  Surgeon: Israel Hurtado MD;  Location: [facility] PAIN MGT;  Service: Pain Management;  Laterality: Left;  4/3 LM TO R/S    RADIOFREQUENCY ABLATION Right 11/27/2023    Procedure: RADIOFREQUENCY ABLATION RIGHT L3, 4, 5 1 OF 3;  Surgeon: Israel Hurtado MD;  Location: [facility] PAIN MGT;  Service: Pain Management;  Laterality: Right;    RADIOFREQUENCY ABLATION Right 01/22/2024    Procedure: RADIOFREQUENCY ABLATION RIGHT GENICULAR 3 NERVES 3 OF 3;  Surgeon: Israel Hurtado MD;  Location: [facility] PAIN MGT;  Service: Pain Management;  Laterality: Right;    RADIOFREQUENCY ABLATION Left 02/12/2024    Procedure: RADIOFREQUENCY ABLATION LEFT L3, 4, 5;  Surgeon: Israel Hurtado MD;  Location: [facility] PAIN MGT;  Service: Pain Management;  Laterality: Left;  [phone]    RADIOFREQUENCY ABLATION OF LUMBAR MEDIAL BRANCH NERVE AT SINGLE LEVEL Left 06/26/2018    Procedure: RADIOFREQUENCY ABLATION, NERVE, MEDIAL BRANCH, LUMBAR, 1 LEVEL;  Surgeon: Lina Wagner MD;  Location: [facility] PAIN MGT;  Service: Pain Management;  Laterality: Left;  Left Cooled RFA @ L2,3,4,5  [phone]  with Sedation    1 of 2    RADIOFREQUENCY ABLATION OF LUMBAR MEDIAL BRANCH NERVE AT SINGLE LEVEL Right 07/10/2018    Procedure: RADIOFREQUENCY ABLATION, NERVE, MEDIAL BRANCH, LUMBAR, 1 LEVEL;  Surgeon: Lina Wagner MD;  Location:  [facility] PAIN MGT;  Service: Pain Management;  Laterality: Right;  RIght Cooled RFA @ L2,3,4,5  [phone] with Sedation    2 of 2    TRIGGER FINGER RELEASE Right 2017    TRIGGER FINGER RELEASE Left 07/29/2019    Procedure: RELEASE, TRIGGER FINGER, Left Thumb;  Surgeon: Velma Garcia MD;  Location: [facility] OR;  Service: Orthopedics;  Laterality: Left;  Local w/ MAC       Family History:  Family History   Problem Relation Name Age of Onset    Diabetes Mother      Cataracts Mother      Diabetes Father      Cataracts Father      Hypertension Sister      Diabetes Sister      No Known Problems Sister      Cancer Sister          lymphoma     Coronary artery disease Brother      Diabetes Brother      Diabetes Brother      Hypotension Brother      Kidney failure Brother      Hypotension Brother      Amblyopia Neg Hx      Blindness Neg Hx      Glaucoma Neg Hx      Macular degeneration Neg Hx      Retinal detachment Neg Hx      Strabismus Neg Hx      Stroke Neg Hx      Thyroid disease Neg Hx      Anesthesia problems Neg Hx         Social History:  Social History     Socioeconomic History    Marital status:    Tobacco Use    Smoking status: Never    Smokeless tobacco: Never   Substance and Sexual Activity    Alcohol use: Not Currently     Comment: occasionally     Drug use: No    Sexual activity: Yes     Partners: Female     Birth control/protection: None   Social History Narrative    Disabled. The patient is the youngest of 6 siblings. Single. Lives with single-sex partner.                  Social Determinants of Health     Financial Resource Strain: Medium Risk (1/17/2024)    Overall Financial Resource Strain (CARDIA)     Difficulty of Paying Living Expenses: Somewhat hard   Food Insecurity: No Food Insecurity (1/17/2024)    Hunger Vital Sign     Worried About Running Out of Food in the Last Year: Never true     Ran Out of Food in the Last Year: Never true   Transportation Needs: No Transportation Needs (1/17/2024)     PRAPARE - Transportation     Lack of Transportation (Medical): No     Lack of Transportation (Non-Medical): No   Physical Activity: Insufficiently Active (1/17/2024)    Exercise Vital Sign     Days of Exercise per Week: 4 days     Minutes of Exercise per Session: 30 min   Stress: Stress Concern Present (1/17/2024)    [employer] - Occupational Stress Questionnaire     Feeling of Stress : To some extent   Housing Stability: High Risk (1/17/2024)    Housing Stability Vital Sign     Unable to Pay for Housing in the Last Year: Yes     Number of Places Lived in the Last Year: 1     Unstable Housing in the Last Year: No       OBJECTIVE:      BP (!) 143/84   Pulse 63   Temp 98.5 °F (36.9 °C)   Resp 18   Wt 115 kg (253 lb 8.5 oz)   LMP 06/26/2018   SpO2 98%   BMI 42.19 kg/m²     PHYSICAL EXAMINATION:      GENERAL: Well appearing, in no acute distress, alert and oriented x3.   PSYCH:  Mood and affect appropriate.   SKIN: Skin color, texture, turgor normal, no rashes or lesions.   HEAD/FACE:  Normocephalic, atraumatic.   CV: Rate regular.  PULM: No evidence of respiratory difficulty, symmetric chest rise.  BACK: Negative SLR bilaterally, but positive to posterior thigh tightness and pain. There is pain with palpation over midline lumbar spine. Limited ROM with pain on flexion and extension. Mild tenderness over bilateral SIJ. Negative FABERE. Negative FADIR.  EXTREMITIES: No edema.  Mild TTP over bilateral GTB.   MUSCULOSKELETAL: 4/5 strength in right ankle with plantar and dorsiflexion. 4/5 strength in left ankle with plantar and dorsiflexion. 5/5 strength with right knee flexion and extension. 5/5 strength with left knee flexion and extension. 5/5 strength in right EHL, 5/5 strength in left EHL.  NEURO:  Plantar response are downgoing. No clonus. Normal sensation.   GAIT: Antalgic- ambulates without assistance.      ASSESSMENT: 58 y.o. year old female with low back and knee pain,  consistent with the followin. Encounter for monitoring opioid maintenance therapy  Pain Clinic Drug Screen      2. Chronic pain syndrome  Pain Clinic Drug Screen      3. Sacroiliitis        4. Greater trochanteric bursitis of both hips        5. Lumbar spondylosis        6. DDD (degenerative disc disease), lumbar              PLAN:     - Previous imaging reviewed.     - She is s/p Bilateral SIJ and GTB with 70% relief    - Continue Percocet 10/325 mg TID PRN. Last fill 2024. Dr Hurtado refilled today.     - The patient is here today for a refill of current pain medications and they believe these provide effective pain control and improvements in quality of life.  The patient notes no serious side effects, and feels the benefits outweigh the risks.  The patient was reminded of the pain contract that they signed previously as well as the risks and benefits of the medication including possible death.  The updated [place] [employer] prescription monitoring program was reviewed, and the patient has been found to be compliant with current treatment plan. Medication management provided by Dr Hurtado.     - UDS 2024 reviewed and appropriate. Repeat today.     - Due for annual visit with MD by 10/2024.    - RTC 3 months or sooner.     - Continue HEP and water aerobics.     The above plan and management options were discussed at length with patient. Patient is in agreement with the above and verbalized understanding.     Anisa Cole NP  2024

## 2024-06-02 LAB
1OH-MIDAZOLAM UR QL SCN: NOT DETECTED
6MAM UR QL: NOT DETECTED
7AMINOCLONAZEPAM UR QL: NOT DETECTED
A-OH ALPRAZ UR QL: NOT DETECTED
ALPRAZ UR QL: NOT DETECTED
AMPHET UR QL SCN: NOT DETECTED
ANNOTATION COMMENT IMP: NORMAL
BARBITURATES UR QL: NEGATIVE
BUPRENORPHINE UR QL: NOT DETECTED
BZE UR QL: NEGATIVE
CARBOXYTHC UR QL: NEGATIVE
CARISOPRODOL UR QL: NEGATIVE
CLONAZEPAM UR QL: NOT DETECTED
CODEINE UR QL: NOT DETECTED
CREAT UR-MCNC: 76.2 MG/DL (ref 20–400)
DIAZEPAM UR QL: NOT DETECTED
ETHYL GLUCURONIDE UR QL: NEGATIVE
FENTANYL UR QL: NOT DETECTED
GABAPENTIN UR QL CFM: NOT DETECTED
HYDROCODONE UR QL: NOT DETECTED
HYDROMORPHONE UR QL: NOT DETECTED
LORAZEPAM UR QL: NOT DETECTED
MDA UR QL: NOT DETECTED
MDEA UR QL: NOT DETECTED
MDMA UR QL: NOT DETECTED
ME-PHENIDATE UR QL: NOT DETECTED
METHADONE UR QL: NEGATIVE
METHAMPHET UR QL: NOT DETECTED
MIDAZOLAM UR QL SCN: NOT DETECTED
MORPHINE UR QL: NOT DETECTED
NALOXONE UR QL CFM: NOT DETECTED
NORBUPRENORPHINE UR QL CFM: NOT DETECTED
NORDIAZEPAM UR QL: NOT DETECTED
NORFENTANYL UR QL: NOT DETECTED
NORHYDROCODONE UR QL CFM: NOT DETECTED
NORMEPERIDINE UR QL CFM: NOT DETECTED
NOROXYCODONE UR QL CFM: PRESENT
NOROXYMORPHONE UR QL SCN: NOT DETECTED
OXAZEPAM UR QL: NOT DETECTED
OXYCODONE UR QL: NOT DETECTED
OXYMORPHONE UR QL: NOT DETECTED
PATHOLOGY STUDY: NORMAL
PCP UR QL: NEGATIVE
PHENTERMINE UR QL: NOT DETECTED
PREGABALIN UR QL CFM: NOT DETECTED
SERVICE CMNT-IMP: NORMAL
TAPENTADOL UR QL SCN: NOT DETECTED
TAPENTADOL UR QL SCN: NOT DETECTED
TEMAZEPAM UR QL: NOT DETECTED
TRAMADOL UR QL: NEGATIVE
ZOLPIDEM PHENYL-4-CARB UR QL SCN: NOT DETECTED
ZOLPIDEM UR QL: NOT DETECTED

## 2024-06-11 ENCOUNTER — PATIENT MESSAGE (OUTPATIENT)
Dept: BARIATRICS | Facility: CLINIC | Age: 59
End: 2024-06-11
Payer: MEDICARE

## 2024-06-12 ENCOUNTER — PATIENT MESSAGE (OUTPATIENT)
Dept: ORTHOPEDICS | Facility: CLINIC | Age: 59
End: 2024-06-12
Payer: MEDICARE

## 2024-06-12 DIAGNOSIS — T84.018S FAILURE OF TOTAL KNEE REPLACEMENT, SEQUELA: Primary | ICD-10-CM

## 2024-06-12 DIAGNOSIS — Z96.659 FAILURE OF TOTAL KNEE REPLACEMENT, SEQUELA: Primary | ICD-10-CM

## 2024-06-13 ENCOUNTER — TELEPHONE (OUTPATIENT)
Dept: PREADMISSION TESTING | Facility: HOSPITAL | Age: 59
End: 2024-06-13
Payer: MEDICARE

## 2024-06-13 ENCOUNTER — PATIENT MESSAGE (OUTPATIENT)
Dept: ADMINISTRATIVE | Facility: OTHER | Age: 59
End: 2024-06-13
Payer: MEDICARE

## 2024-06-13 DIAGNOSIS — M79.609 PAIN IN EXTREMITY, UNSPECIFIED EXTREMITY: ICD-10-CM

## 2024-06-13 DIAGNOSIS — Z01.818 PRE-OP TESTING: Primary | ICD-10-CM

## 2024-06-13 DIAGNOSIS — E11.9 TYPE 2 DIABETES MELLITUS WITHOUT COMPLICATION, WITHOUT LONG-TERM CURRENT USE OF INSULIN: Chronic | ICD-10-CM

## 2024-06-13 RX ORDER — ASPIRIN 81 MG/1
81 TABLET ORAL DAILY
Status: ON HOLD | COMMUNITY
End: 2024-07-19 | Stop reason: HOSPADM

## 2024-06-13 NOTE — ANESTHESIA PAT ROS NOTE
7/1/24  Alivia Thomas is a 58 y.o., female with failed right knee arthroplasty arrives for anesthesia assessment and preop instructions.      Pre-op Assessment    I have reviewed the Patient Summary Reports.     I have reviewed the Nursing Notes. I have reviewed the NPO Status.   I have reviewed the Medications.     Review of Systems  Anesthesia Hx:  No problems with previous Anesthesia  Immediate postop difficulty breathing.after last surgery per patient    Consider opioid tolerance  in planning post operative pain control--Percocet 3x/day      PADDY  Asthma  BMI 41.24   Ozempic Use, Monday   Gastric Sleeve  AFIB - apixaban  MCA Stroke 1/2024 (no residual deficits)  History of COVID 2020     History of prior surgery of interest to airway management or planning: gastric bypass. Previous anesthesia: MAC, General 6/14/22 PANNICULECTOMY (Abdomen (Right: Eye) with general anesthesia.       8/29/23 EXTRACTION, CATARACT, WITH IOL INSERTION (Right: Eye) with MAC.  Airway issues documented on chart review include GETA, easy direct laryngoscopy      Personal Hx of Anesthesia complications       Denies Pulmonary / Ventilatory Issues.   Slow To Awaken/Delayed Emergence and significant; extubation delayed; prolonged PACU stay          Social:  No Alcohol Use, Non-Smoker       Hematology/Oncology:    Oncology Normal    -- Denies Anemia:                                  EENT/Dental:  chronic allergic rhinitis  Eyes: Visual Impairment   Has Bilateral and S/P Extraction - Bilateral Catarract                  Cardiovascular:  Exercise tolerance: poor   Denies Pacemaker. Hypertension, well controlled       Denies Angina.     hyperlipidemia SANCHEZ  ECG has been reviewed. EKG-  from--3/26/2024  It was reported to be showing -Sinus rhythm with premature atrial complexes   Right atrial enlargement   Borderline Abnormal ECG   When  compared with ECG of 04-JAN-2024 20:28,   premature atrial complexes are present     2019 · The relative PET images are normal showing no clinically significant regional resting or stress induced perfusion defects.  · Gated perfusion images showed an ejection fraction of 77 % at rest and 80 % during stress. Normal is >51%.  · Wall motion was normal at rest and stress.  · LV cavity size is normal at rest and normal at stress.  · The EKG portion of this study is negative for myocardial ischemia.  · Arrhythmias during stress: rare PACs.  · The patient reported headache during the stress test.          Aortic Stenosis (AS)    Echo Jan 5 th 2024     ·  Left Ventricle: The left ventricle is normal in size. Ventricular mass is normal. Normal wall thickness. Normal wall motion. There is normal systolic function with a visually estimated ejection fraction of 60 - 65%. Ejection fraction by visual approximation is 63%. There is normal diastolic function.  ·  Right Ventricle: Normal right ventricular cavity size. Wall thickness is normal. Right ventricle wall motion  is normal. Systolic function is normal.  ·  Left Atrium: Left atrium is mildly dilated.  ·  Right Atrium: Right atrium is mildly dilated.  ·  Aortic Valve: The aortic valve is a trileaflet valve. There is aortic valve sclerosis.  ·  Tricuspid Valve: There is mild regurgitation.  ·  Pulmonary Artery: The estimated pulmonary artery systolic pressure is 32 mmHg.  ·  IVC/SVC: Normal venous pressure at 3 mmHg.     Jan 2024      Cardiac event monitoring  ·  Baseline rhythm was normal sinus rhythm with normal intervals and an initial heart rate of 73 bpm.  ·  There were 61 patient-triggered episodes with reported symptoms of heart racing and shortness of breath. These episodes corresponded to periods of normal sinus rhythm and sinus tachycardia with occasional PVCs and frequent PACs.  ·  There were occasional PVCs and frequent PACs.  ·  There was an event of paroxysmal  atrial fibrillation captured on 1/27/2024, at times with rapid ventricular response.                     Hypertension     Disorder of Cardiac Rhythm, Atrial Fibrillation, Paroxysmal Atrial Fibrillation     Pulmonary:    Asthma mild Shortness of breath  Sleep Apnea Mild intermittent asthma without complication    Noncompliance with CPAP treatment  Dyspn                 Renal/:   Denies Chronic Renal Disease. no renal calculi  Proteinuria  Uterine fibroid                  Hepatic/GI:   Denies PUD.  GERD, well controlled Denies Liver Disease.  Denies Hepatitis. 6/14/2022  S/P panniculectomy   8/28/2019  Sleeve Gastrectomy          Musculoskeletal:  Arthritis    Musculoskeletal General/Symptoms: low back pain, neck pain, joint pain, joint stiffness, joint immobility, joint swelling, limited ROM. Functional capacity is ambulatory without assistance. 6/19/2014  S/P total knee arthroplasty, left  5/27/2024 Imaging: demonstrate progression lucency about the cement bone interface especially on the tibia  Joint Disease:  Arthritis, Osteoarthritis, knee, Gout   Chronic pain  Both knees  She is on Percocet 3 times a day through pain management      DDD (degenerative disc disease), cervical    DDD (degenerative disc disease), thoracic    DDD (degenerative disc disease), lumbar    Chronic pain  CTS (carpal tunnel syndrome)  Right carpal tunnel syndrome  Cervical radiculopathy  Cervical spondylosis  Cubital tunnel syndrome on right  Cervical stenosis of spinal canal  Thoracic spondylosis  Lumbar spondylosis  Cervical spine instability  Myeloradiculopathy  Neural foraminal stenosis of cervical spine  Idiopathic scoliosis of thoracolumbar spine  Spondylosis without myelopathy  Osteoarthritis of lumbar spine         Spine Disorders: lumbar, cervical and thoracic Disc disease, Degenerative disease and Chronic Pain Degenerative Joint Disease, Scoliosis     Spinal Stenosis, Cervical Spinal Stenosis, Thoracic Spinal Stenosis, Lumbar  Spinal Stenosis Cervical Spine Disorder, Cervical Disc Disease, Radiculopathy Cervical stenosis of spinal canal  Cervical spondylosis  MRI 2018- Mild multilevel cervical spondylosis with mild spinal canal stenosis and mild right neuroforaminal narrowing at C4-5 and C5-6. Thoracic Spine Disorders, Thoracic Disc Disease Idiopathic scoliosis of thoracolumbar spine  Spondylosis without myelopathy Lumbar Spine Disorders, Lumbar Disc Disease Lumbar spondylosis  Neurological:  Denies TIA. CVA Neuromuscular Disease,   Denies Seizures.          Chronic Pain Syndrome Arthritis, Osteoarthritis, knee, Cervical Radiculopathy, Low Back Pain, Lumbar Radiculopathy  Polyneuropathy             CVA - Cerebrovasular Accident , Most recent CVA was on MCA Stroke 1/2024 (no residual deficits), , has had 1 stroke             Neuromuscular Disease   Endocrine:  Diabetes, type 2 Denies Hypothyroidism.  Denies Hyperthyroidism.  Diabetes  , Complications include Diabetic Neuropathy                  Morbid Obesity / BMI > 40  Psych:  Psychiatric History  depression   Chronic, continuous use of opioids                    Physical Exam  General: Well nourished, Cooperative, Alert and Oriented    Airway:  Mouth Opening: Normal  Tongue: Normal  Neck ROM: Normal ROM    Dental:  Caps / Implants, Intact    Chest/Lungs:  Clear to auscultation, Normal Respiratory Rate    Heart:  Rate: Normal  Rhythm: Regular Rhythm  Sounds: Normal          Anesthesia Assessment: Preoperative EQUATION    Planned Procedure: Procedure(s) (LRB):  REVISION, ARTHROPLASTY, KNEE: RIGHT (Right)  Requested Anesthesia Type:Regional  Surgeon: Theodore Swan MD  Service: Orthopedics  Known or anticipated Date of Surgery:7/18/2024    Surgeon notes: reviewed    Electronic QUestionnaire Assessment completed via nurse interview with patient.        Triage considerations:     The patient has no apparent active cardiac condition (No unstable coronary Syndrome such as severe unstable  angina or recent [<1 month] myocardial infarction, decompensated CHF, severe valvular   disease or significant arrhythmia)    Previous anesthesia records:GETA, MAC, Spinal Anesthesia , CSE, and No problems  8/29/23 EXTRACTION, CATARACT, WITH IOL INSERTION (Right: Eye)   Airway:  Mallampati: I   Mouth Opening: Normal  TM Distance: Normal  Tongue: Normal  Neck ROM: Normal ROM    6/14/22 PANNICULECTOMY (Abdomen)    Method of Intubation: Direct laryngoscopy Mask Ventilation: Easy Intubated: Postinduction Blade: Ziyad #4 Airway Device Size: 7.5 Placement Verified By: Capnometry Complicating Factors: Obesity Findings Post-Intubation: Bilateral breath sounds Secured at: Lips Complications: None     Last PCP note: within 1 month , within [facility]   Subspecialty notes: Cardiology: EP, Ortho, Pain Management, Bariatrics    Other important co-morbidities: DM2, GERD, HLD, HTN, Morbid Obesity, PADDY, and AFIB (ELIQUIS), MCA Stroke 1/2024 (no residual deficits), Gout, PN, Ozempic Use, Gastric Sleeve, Mild Asthma, Chronic Pain (Percocet), Lumbar DDD, Lumbar Stenosis, LTKA 2014 Dr. Swan, RTKA Revision 2014 Dr. Swan       Tests already available:  Available tests,  within [facility] .   3/26/24 EKG  3/18/24 CBC  2/7/24 CMP  1/5/24 A1C, Echo  10/23/23 CT Lumbar Spine             Instructions given. (See in Nurse's note)    Optimization:  Anesthesia Preop Clinic Assessment  Indicated POC     Medical Opinion Indicated: Neurology        Sub-specialist consult indicated:  [facility]. To        Plan:    Testing:  A1C, CMP, Hematology Profile, and PT/INR   Pre-anesthesia  visit       Visit focus: possible regional anesthesia and/or nerve block      Consultation:[facility] Perioperative Hospitalist     Patient  has previously scheduled Medical Appointment: 7/1    Navigation: Tests Scheduled.              Consults scheduled.             Results will be tracked by Preop Clinic.      6/13/24 Neurology: Message from Beltran QUICK  MD Mag sent at 6/13/2024  2:07 PM CDT -----  Pls add asa 81mg/day 4 days prior until Eliquis is restarted.  Thanks,  MENDOZA

## 2024-06-13 NOTE — TELEPHONE ENCOUNTER
----- Message from Beltran Hathaway MD sent at 6/13/2024  2:07 PM CDT -----  Pls add asa 81mg/day 4 days prior until Eliquis is restarted.  ThanksMENDOZA  ----- Message -----  From: Ceci Olivera RN  Sent: 6/13/2024  11:54 AM CDT  To: Beltran Hathaway MD; #    Dr. Hathaway,    This patient is scheduled for Total Knee Revision Arthroplasty on 7/18/24 with Dr. Swan using neuraxial anesthesia.  Requesting neurology optimization prior to this procedure.  Eliquis will need to be held 3 days prior to surgery. Aspirin 81mg does not need to be held for this surgery.  Please advise. Will await your response.    Thanks in advance,  Ceci Olivera RN                                                                                [facility]

## 2024-06-18 DIAGNOSIS — M25.562 CHRONIC PAIN OF BOTH KNEES: ICD-10-CM

## 2024-06-18 DIAGNOSIS — G89.29 CHRONIC PAIN OF BOTH KNEES: ICD-10-CM

## 2024-06-18 DIAGNOSIS — M51.36 DDD (DEGENERATIVE DISC DISEASE), LUMBAR: ICD-10-CM

## 2024-06-18 DIAGNOSIS — M25.561 CHRONIC PAIN OF BOTH KNEES: ICD-10-CM

## 2024-06-18 DIAGNOSIS — M17.0 PRIMARY OSTEOARTHRITIS OF BOTH KNEES: ICD-10-CM

## 2024-06-18 DIAGNOSIS — M47.816 SPONDYLOSIS OF LUMBAR REGION WITHOUT MYELOPATHY OR RADICULOPATHY: ICD-10-CM

## 2024-06-18 DIAGNOSIS — G89.4 CHRONIC PAIN SYNDROME: ICD-10-CM

## 2024-06-18 RX ORDER — OXYCODONE AND ACETAMINOPHEN 10; 325 MG/1; MG/1
1 TABLET ORAL EVERY 8 HOURS PRN
Qty: 90 TABLET | Refills: 0 | Status: SHIPPED | OUTPATIENT
Start: 2024-06-18

## 2024-06-18 NOTE — TELEPHONE ENCOUNTER
Patient requesting refill on PERCOCET   Last office visit 5/29/24   shows last refill on 5/29/24  Patient does not have a pain contract on file with [facility] department  Patient last UDS 5/29/24 was not consistent with current therapy    CODEINE  Not Detected  Not Detected  Not Detected Not Detected    Comment: INTERPRETIVE INFORMATION: Codeine, U  Positive Cutoff: 40 ng/mL  Methodology: Mass Spectrometry   MORPHINE  Not Detected  Not Detected  Not Detected Not Detected    Comment: INTERPRETIVE INFORMATION:Morphine, U  Positive Cutoff: 20 ng/mL  Methodology: Mass Spectrometry   6-ACETYLMORPHINE  Not Detected  Not Detected  Not Detected Not Detected    Comment: INTERPRETIVE INFORMATION:6-acetylmorphine, U  Positive Cutoff: 20 ng/mL  Methodology: Mass Spectrometry   OXYCODONE  Not Detected  Present  Present Present    Comment: INTERPRETIVE INFORMATION:Oxycodone, U  Positive Cutoff: 40 ng/mL  Methodology: Mass Spectrometry   NOROYXCODONE  Present  Present  Present Present    Comment: INTERPRETIVE INFORMATION:Noroxycodone, U  Positive Cutoff: 100 ng/mL  Methodology: Mass Spectrometry   OXYMORPHONE  Not Detected  Present  Present Present    Comment: INTERPRETIVE INFORMATION:Oxymorphone, U  Positive Cutoff: 40 ng/mL  Methodology: Mass Spectrometry   NOROXYMORPHONE  Not Detected  Not Detected  Not Detected Not Detected    Comment: INTERPRETIVE INFORMATION:Noroxymorphone, U  Positive Cutoff: 100 ng/mL  Methodology: Mass Spectrometry   HYDROCODONE  Not Detected  Not Detected  Not Detected Not Detected    Comment: INTERPRETIVE INFORMATION:Hydrocodone, U  Positive Cutoff: 40 ng/mL  Methodology: Mass Spectrometry   NORHYDROCODONE  Not Detected  Not Detected  Not Detected Not Detected    Comment: INTERPRETIVE INFORMATION:Norhydrocodone, U  Positive Cutoff: 100 ng/mL  Methodology: Mass Spectrometry   HYDROMORPHONE  Not Detected  Not Detected  Not Detected Not Detected    Comment: INTERPRETIVE  INFORMATION:Hydromorphone, U  Positive Cutoff: 20 ng/mL  Methodology: Mass Spectrometry   BUPRENORPHINE  Not Detected  Not Detected  Not Detected Not Detected    Comment: INTERPRETIVE INFORMATION:Buprenorphine, U  Positive Cutoff: 5 ng/mL  Methodology: Mass Spectrometry   NORUBPRENORPHINE  Not Detected  Not Detected  Not Detected Not Detected    Comment: INTERPRETIVE INFORMATION:Norbuprenorphine, U  Positive Cutoff: 20 ng/mL  Methodology: Mass Spectrometry   FENTANYL  Not Detected  Not Detected  Not Detected Not Detected    Comment: INTERPRETIVE INFORMATION:Fentanyl, U  Positive Cutoff: 2 ng/mL  Methodology: Mass Spectrometry   NORFENTANYL  Not Detected  Not Detected  Not Detected Not Detected    Comment: INTERPRETIVE INFORMATION:Norfentanyl, U  Positive Cutoff: 2 ng/mL  Methodology: Mass Spectrometry   MEPERIDINE METABOLITE  Not Detected  Not Detected  Not Detected Not Detected    Comment: INTERPRETIVE INFORMATION:Meperidine metabolite, U  Positive Cutoff: 50 ng/mL  Methodology: Mass Spectrometry   TAPENTADOL  Not Detected  Not Detected  Not Detected Not Detected    Comment: INTERPRETIVE INFORMATION:Tapentadol, U  Positive Cutoff: 100 ng/mL  Methodology: Mass Spectrometry   TAPENTADOL-O-SULF  Not Detected  Not Detected  Not Detected Not Detected    Comment: INTERPRETIVE INFORMATION:Tapentadol-o-Sulf, U  Positive Cutoff: 200 ng/mL  Methodology: Mass Spectrometry   METHADONE  Negative  Not Detected  Not Detected Not Detected    Comment: Presumptive negative by immunoassay. Testing by mass  spectrometry is available on request.  INTERPRETIVE INFORMATION: Methadone Screen, U  Positive Cutoff: 150 ng/mL  Methodology: Immunoassay   TRAMADOL  Negative  Not Detected  Not Detected Not Detected    Comment: Presumptive negative by immunoassay. Testing by mass  spectrometry is available on request.  INTERPRETIVE INFORMATION:Tramadol Screen, U  Positive Cutoff: 100 ng/mL  Methodology: Immunoassay   AMPHETAMINE  Not Detected   Not Detected  Not Detected Not Detected    Comment: INTERPRETIVE INFORMATION:Amphetamine, U  Positive Cutoff: 50 ng/mL  Methodology: Mass Spectrometry   METHAMPHETAMINE  Not Detected  Not Detected  Not Detected Not Detected    Comment: INTERPRETIVE INFORMATION:Methamphetamine, U  Positive Cutoff: 200 ng/mL  Methodology: Mass Spectrometry   MDMA- ECSTASY  Not Detected  Not Detected  Not Detected Not Detected    Comment: INTERPRETIVE INFORMATION:MDMA, U  Positive Cutoff: 200 ng/mL  Methodology: Mass Spectrometry   MDA  Not Detected  Not Detected  Not Detected Not Detected    Comment: INTERPRETIVE INFORMATION:MDA, U  Positive Cutoff: 200 ng/mL  Methodology: Mass Spectrometry   MDEA- Kait  Not Detected  Not Detected  Not Detected Not Detected    Comment: INTERPRETIVE INFORMATION:MDEA, U  Positive Cutoff: 200 ng/mL  Methodology: Mass Spectrometry   METHYLPHENIDATE  Not Detected  Not Detected  Not Detected Not Detected    Comment: INTERPRETIVE INFORMATION:Methylphenidate, U  Positive Cutoff: 100 ng/mL  Methodology: Mass Spectrometry   PHENTERMINE  Not Detected  Not Detected  Not Detected Not Detected    Comment: INTERPRETIVE INFORMATION:Phentermine, U  Positive Cutoff: 100 ng/mL  Methodology: Mass Spectrometry   BENZOYLECGONINE  Negative  Not Detected  Not Detected Not Detected    Comment: Presumptive negative by immunoassay. Testing by mass  spectrometry is available on request.  INTERPRETIVE INFORMATION:Cocaine Screen, U  Positive Cutoff: 150 ng/mL  Methodology: Immunoassay   ALPRAZOLAM  Not Detected  Not Detected  Not Detected Not Detected    Comment: INTERPRETIVE INFORMATION:Alprazolam, U  Positive Cutoff: 40 ng/mL  Methodology: Mass Spectrometry   ALPHA-OH-ALPRAZOLAM  Not Detected  Not Detected  Not Detected Not Detected    Comment: INTERPRETIVE INFORMATION:Alpha-OH-Alprazolam, U  Positive Cutoff: 20 ng/mL  Methodology: Mass Spectrometry   CLONAZEPAM  Not Detected  Not Detected  Not Detected Not Detected    Comment:  INTERPRETIVE INFORMATION:Clonazepam, U  Positive Cutoff: 20 ng/mL  Methodology: Mass Spectrometry   7-AMINOCLONAZEPAM  Not Detected  Not Detected  Not Detected Not Detected    Comment: INTERPRETIVE INFORMATION:7-Aminoclonazepam, U  Positive Cutoff: 40 ng/mL  Methodology: Mass Spectrometry   DIAZEPAM  Not Detected  Not Detected  Not Detected Not Detected    Comment: INTERPRETIVE INFORMATION:Diazepam, U  Positive Cutoff: 50 ng/mL  Methodology: Mass Spectrometry   NORDIAZEPAM  Not Detected  Not Detected  Not Detected Not Detected    Comment: INTERPRETIVE INFORMATION:Nordiazepam, U  Positive Cutoff: 50 ng/mL  Methodology: Mass Spectrometry   OXAZEPAM  Not Detected  Not Detected  Not Detected Not Detected    Comment: INTERPRETIVE INFORMATION:Oxazepam, U  Positive Cutoff: 50 ng/mL  Methodology: Mass Spectrometry   TEMAZEPAM  Not Detected  Not Detected  Not Detected Not Detected    Comment: INTERPRETIVE INFORMATION:Temazepam, U  Positive Cutoff: 50 ng/mL  Methodology: Mass Spectrometry   Lorazepam  Not Detected  Not Detected  Not Detected Not Detected    Comment: INTERPRETIVE INFORMATION:Lorazepam, U  Positive Cutoff: 60 ng/mL  Methodology: Mass Spectrometry   MIDAZOLAM  Not Detected  Not Detected  Not Detected Not Detected    Comment: INTERPRETIVE INFORMATION:Midazolam, U  Positive Cutoff: 20 ng/mL  Methodology: Mass Spectrometry   ZOLPIDEM  Not Detected  Not Detected  Not Detected Not Detected    Comment: INTERPRETIVE INFORMATION:Zolpidem, U  Positive Cutoff: 20 ng/mL  Methodology: Mass Spectrometry   BARBITURATES  Negative  Not Detected  Not Detected Not Detected    Comment: Presumptive negative by immunoassay. Testing by mass  spectrometry is available on request.  INTERPRETIVE INFORMATION:Barbiturates Screen, U  Positive Cutoff: 200 ng/mL  Methodology: Immunoassay   Creatinine, Urine 20.0 - 400.0 mg/dL 76.2 117.0 R 61.1 66.0 R 112.3 93.0 86.0 R, CM   ETHYL GLUCURONIDE  Negative  Not Detected  Not Detected Not Detected     Comment: Presumptive negative by immunoassay. Testing by mass  spectrometry is available on request.  INTERPRETIVE INFORMATION:Ethyl Glucuronide Screen, U  Positive Cutoff: 500 ng/mL  Methodology: Immunoassay   MARIJUANA METABOLITE  Negative  Not Detected CM  Not Detected Not Detected    Comment: Presumptive negative by immunoassay. Testing by mass  spectrometry is available on request.  INTERPRETIVE INFORMATION: THC (Cannabinoids) Screen, U  Positive Cutoff: 50 ng/mL  Methodology: Immunoassay   PCP  Negative  Not Detected  Not Detected Not Detected    Comment: Presumptive negative by immunoassay. Testing by mass  spectrometry is available on request.  INTERPRETIVE INFORMATION:Phencyclidine Screen, U  Positive Cutoff: 25 ng/mL  Methodology: Immunoassay   CARISOPRODOL  Negative  Not Detected CM  Not Detected CM Not Detected CM    Comment: Presumptive negative by immunoassay. Testing by mass  spectrometry is available on request.  INTERPRETIVE INFORMATION: Carisoprodol Screen, U  Positive Cutoff: 100 ng/mL  Methodology: Immunoassay  The carisoprodol immunoassay has cross-reactivity to  carisoprodol and meprobamate.   Naloxone  Not Detected  Not Detected  Not Detected Not Detected    Comment: INTERPRETIVE INFORMATION:Naloxone, U  Positive Cutoff: 100 ng/mL  Methodology: Mass Spectrometry   Gabapentin  Not Detected  Not Detected  Not Detected Not Detected    Comment: INTERPRETIVE INFORMATION:Gabapentin, U  Positive Cutoff: 3,000 ng/mL  Methodology: Mass Spectrometry   Pregabalin  Not Detected  Not Detected  Not Detected Not Detected    Comment: INTERPRETIVE INFORMATION:Pregabalin, U  Positive Cutoff: 3,000 ng/mL  Methodology: Mass Spectrometry   Alpha-OH-Midazolam  Not Detected  Not Detected  Not Detected Not Detected    Comment: INTERPRETIVE INFORMATION:Alpha-OH-Midazolam, U  Positive Cutoff: 20 ng/mL  Methodology: Mass Spectrometry   Zolpidem Metabolite  Not Detected  Not Detected  Not Detected Not Detected     Comment: INTERPRETIVE INFORMATION:Zolpidem Metabolite, U  Positive Cutoff: 100 ng/mL  Methodology: Mass Spectrometry

## 2024-06-25 DIAGNOSIS — Z00.00 ENCOUNTER FOR MEDICARE ANNUAL WELLNESS EXAM: ICD-10-CM

## 2024-06-27 NOTE — DISCHARGE INSTRUCTIONS
Your surgery has been scheduled for:___________7/18/2024_______________________________    You should report to:  ____[facility], located on the [place] side of the first floor of the           [facility] ([phone])  __x__[facility], located on the [facility] side of the            Second floor of the [facility] ([phone])  ____3rd Floor [facility] located on the [facility] side of the [facility] [phone]  ____[facility]: located at [address] [facility] [place] [address]. [address]     Please Note   Tell your doctor if you take Aspirin, products containing Aspirin, herbal medications  or blood thinners, such as Coumadin, Ticlid, or Plavix.  (Consult your provider regarding holding or stopping before surgery).  Arrange for someone to drive you home following surgery.  You will not be allowed to leave the surgical facility alone or drive yourself home following sedation and anesthesia.            Before Surgery  Stop taking all herbal medications, vitamins, and supplements 7 days prior to surgery  No Motrin/Advil (Ibuprofen) 7 days before surgery  No Aleve (Naproxen) 7 days before surgery  Stop Taking Asprin, products containing Asprin _7____days before surgery  Stop taking blood thinners_ELIQUIS Hold 3 days prior to surgery        Last dose 7/14/2024  No Goody's/BC  Powder 7 days before surgery  OZEMPIC Comments) Last dose 7/10/2024  Refrain from drinking alcoholic beverages for 24hours before and after surgery  Stop or limit smoking _____7____days before surgery  You may take Tylenol for pain    Night before Surgery  Do not eat or drink after midnight  Take a shower or bath (shower is recommended).  Bathe with Hibiclens soap or an antibacterial soap from the neck down.  If not supplied by your surgeon, hibiclens soap will need to be purchased over the counter in pharmacy.  Rinse  soap off thoroughly.  Shampoo your hair with your regular shampoo    The Day of Surgery  Take another bath or shower with hibiclens or any antibacterial soap, to reduce the chance of infection.  Take heart and blood pressure medications with a small sip of water, as advised by the perioperative team.  Do not take fluid pills  You may brush your teeth and rinse your mouth, but do not swall any additional water.   Do not apply perfumes, powder, body lotions or deodorant on the day of surgery.  Nail polish should be removed.  Do not wear makeup or moisturizer  Wear comfortable clothes, such as a button front shirt and loose fitting pants.  Leave all jewelry, including body piercings, and valuables at home.    Bring any devices you will neeed after surgery such as crutches or canes.  If you have sleep apnea, please bring your CPAP machine  In the event that your physical condition changes including the onset of a cold or respiratory illness, or if you have to delay or cancel your surgery, please notify your surgeon.       Anesthesia: General Anesthesia     You are watched continuously during your procedure by your anesthesia provider.     Youre due to have surgery. During surgery, youll be given medicine called anesthesia or anesthetic. This will keep you comfortable and pain-free. Your anesthesia provider will use general anesthesia.  What is general anesthesia?  General anesthesia puts you into a state like deep sleep. It goes into the bloodstream (IV anesthetics), into the lungs (gas anesthetics), or both. You feel nothing during the procedure. You will not remember it. During the procedure, the anesthesia provider monitors you continuously. He or she checks your heart rate and rhythm, blood pressure, breathing, and blood oxygen.  IV anesthetics. IV anesthetics are given through an IV line in your arm. Theyre often given first. This is so you are asleep before a gas anesthetic is started. Some kinds of IV  anesthetics relieve pain. Others relax you. Your doctor will decide which kind is best in your case.  Gas anesthetics. Gas anesthetics are breathed into the lungs. They are often used to keep you asleep. They can be given through a facemask or a tube placed in your larynx or trachea (breathing tube).  If you have a facemask, your anesthesia provider will most likely place it over your nose and mouth while youre still awake. Youll breathe oxygen through the mask as your IV anesthetic is started. Gas anesthetic may be added through the mask.  If you have a tube in the larynx or trachea, it will be inserted into your throat after youre asleep.  Anesthesia tools and medicines  You will likely have:  IV anesthetics. These are put into an IV line into your bloodstream.  Gas anesthetics. You breathe these anesthetics into your lungs, where they pass into your bloodstream.  Pulse oximeter. This is a small clip that is attached to the end of your finger. This measures your blood oxygen level.  Electrocardiography leads (electrodes). These are small sticky pads that are placed on your chest. They record your heart rate and rhythm.  Blood pressure cuff. This reads your blood pressure.  Risks and possible complications  General anesthesia has some risks. These include:  Breathing problems  Nausea and vomiting  Sore throat or hoarseness (usually temporary)  Allergic reaction to the anesthetic  Irregular heartbeat (rare)  Cardiac arrest (rare)   Anesthesia safety  Follow all instructions you are given for how long not to eat or drink before your procedure.  Be sure your doctor knows what medicines and drugs you take. This includes over-the-counter medicines, herbs, supplements, alcohol or other drugs. You will be asked when those were last taken.  Have an adult family member or friend drive you home after the procedure.  For the first 24 hours after your surgery:  Do not drive or use heavy equipment.  Do not make important  decisions or sign legal documents. If important decisions or signing legal documents is necessary during the first 24 hours after surgery, have a trusted family member or spouse act on your behalf.  Avoid alcohol.  Have a responsible adult stay with you. He or she can watch for problems and help keep you safe.  Date Last Reviewed: 12/1/2016  © 4467-8053 [employer]. [address] [place] [address], [place] [address]. All rights reserved. This information is not intended as a substitute for professional medical care. Always follow your healthcare professional's instructions.

## 2024-07-01 ENCOUNTER — OFFICE VISIT (OUTPATIENT)
Dept: INTERNAL MEDICINE | Facility: CLINIC | Age: 59
End: 2024-07-01
Payer: MEDICARE

## 2024-07-01 ENCOUNTER — HOSPITAL ENCOUNTER (OUTPATIENT)
Dept: RADIOLOGY | Facility: HOSPITAL | Age: 59
Discharge: HOME | End: 2024-07-01
Attending: INTERNAL MEDICINE
Payer: MEDICARE

## 2024-07-01 ENCOUNTER — OFFICE VISIT (OUTPATIENT)
Dept: ORTHOPEDICS | Facility: CLINIC | Age: 59
End: 2024-07-01
Payer: MEDICARE

## 2024-07-01 ENCOUNTER — HOSPITAL ENCOUNTER (OUTPATIENT)
Dept: PREADMISSION TESTING | Facility: HOSPITAL | Age: 59
Discharge: HOME | End: 2024-07-01
Attending: ANESTHESIOLOGY
Payer: MEDICARE

## 2024-07-01 VITALS — BODY MASS INDEX: 41.29 KG/M2 | WEIGHT: 247.81 LBS | HEIGHT: 65 IN

## 2024-07-01 VITALS
DIASTOLIC BLOOD PRESSURE: 74 MMHG | BODY MASS INDEX: 41.29 KG/M2 | HEIGHT: 65 IN | HEART RATE: 74 BPM | WEIGHT: 247.81 LBS | OXYGEN SATURATION: 96 % | TEMPERATURE: 98 F | SYSTOLIC BLOOD PRESSURE: 134 MMHG

## 2024-07-01 DIAGNOSIS — Z91.199 NONCOMPLIANCE WITH CPAP TREATMENT: Chronic | ICD-10-CM

## 2024-07-01 DIAGNOSIS — Z86.73 HISTORY OF RIGHT MCA STROKE: ICD-10-CM

## 2024-07-01 DIAGNOSIS — M54.12 CERVICAL RADICULOPATHY: ICD-10-CM

## 2024-07-01 DIAGNOSIS — M54.41 CHRONIC BILATERAL LOW BACK PAIN WITH BILATERAL SCIATICA: ICD-10-CM

## 2024-07-01 DIAGNOSIS — J45.20 MILD INTERMITTENT ASTHMA WITHOUT COMPLICATION: Chronic | ICD-10-CM

## 2024-07-01 DIAGNOSIS — M54.42 CHRONIC BILATERAL LOW BACK PAIN WITH BILATERAL SCIATICA: ICD-10-CM

## 2024-07-01 DIAGNOSIS — G89.29 CHRONIC BILATERAL LOW BACK PAIN WITH BILATERAL SCIATICA: ICD-10-CM

## 2024-07-01 DIAGNOSIS — D25.9 UTERINE LEIOMYOMA, UNSPECIFIED LOCATION: ICD-10-CM

## 2024-07-01 DIAGNOSIS — E11.49 TYPE II DIABETES MELLITUS WITH NEUROLOGICAL MANIFESTATIONS: ICD-10-CM

## 2024-07-01 DIAGNOSIS — F11.90 CHRONIC, CONTINUOUS USE OF OPIOIDS: Chronic | ICD-10-CM

## 2024-07-01 DIAGNOSIS — Z86.19 HISTORY OF STAPH INFECTION: ICD-10-CM

## 2024-07-01 DIAGNOSIS — I10 PRIMARY HYPERTENSION: ICD-10-CM

## 2024-07-01 DIAGNOSIS — E11.42 DIABETIC POLYNEUROPATHY ASSOCIATED WITH TYPE 2 DIABETES MELLITUS: ICD-10-CM

## 2024-07-01 DIAGNOSIS — T84.019D PROSTHETIC JOINT IMPLANT FAILURE, SUBSEQUENT ENCOUNTER: ICD-10-CM

## 2024-07-01 DIAGNOSIS — T78.1XXD ALLERGIC REACTION TO FOOD, SUBSEQUENT ENCOUNTER: ICD-10-CM

## 2024-07-01 DIAGNOSIS — Z96.651 S/P TOTAL KNEE REPLACEMENT, RIGHT: Primary | ICD-10-CM

## 2024-07-01 DIAGNOSIS — G89.29 OTHER CHRONIC PAIN: Chronic | ICD-10-CM

## 2024-07-01 DIAGNOSIS — Z01.818 PREOP EXAMINATION: Primary | ICD-10-CM

## 2024-07-01 DIAGNOSIS — T78.40XD ALLERGY, SUBSEQUENT ENCOUNTER: ICD-10-CM

## 2024-07-01 DIAGNOSIS — Z86.16 HISTORY OF COVID-19: ICD-10-CM

## 2024-07-01 DIAGNOSIS — K21.9 GASTROESOPHAGEAL REFLUX DISEASE, UNSPECIFIED WHETHER ESOPHAGITIS PRESENT: Chronic | ICD-10-CM

## 2024-07-01 DIAGNOSIS — E78.2 MIXED HYPERLIPIDEMIA: Chronic | ICD-10-CM

## 2024-07-01 DIAGNOSIS — Z90.3 HISTORY OF SLEEVE GASTRECTOMY: Chronic | ICD-10-CM

## 2024-07-01 DIAGNOSIS — F41.9 ANXIETY: ICD-10-CM

## 2024-07-01 DIAGNOSIS — T84.019A PROSTHETIC JOINT IMPLANT FAILURE: ICD-10-CM

## 2024-07-01 DIAGNOSIS — I48.0 PAROXYSMAL ATRIAL FIBRILLATION: ICD-10-CM

## 2024-07-01 PROBLEM — H25.12 NUCLEAR SCLEROTIC CATARACT OF LEFT EYE: Status: RESOLVED | Noted: 2023-08-08 | Resolved: 2024-07-01

## 2024-07-01 PROBLEM — F32.A DEPRESSION: Chronic | Status: RESOLVED | Noted: 2019-01-28 | Resolved: 2024-07-01

## 2024-07-01 PROBLEM — G56.21 CUBITAL TUNNEL SYNDROME ON RIGHT: Status: RESOLVED | Noted: 2018-05-17 | Resolved: 2024-07-01

## 2024-07-01 PROBLEM — M25.512 LEFT SHOULDER PAIN: Status: RESOLVED | Noted: 2017-04-04 | Resolved: 2024-07-01

## 2024-07-01 PROBLEM — T78.40XA ALLERGIES: Status: ACTIVE | Noted: 2024-07-01

## 2024-07-01 PROBLEM — M65.30 TRIGGER FINGER: Status: RESOLVED | Noted: 2019-07-29 | Resolved: 2024-07-01

## 2024-07-01 PROBLEM — Z92.82 RECEIVED TISSUE PLASMINOGEN ACTIVATOR (T-PA) LESS THAN 24 HOURS PRIOR TO ARRIVAL: Status: RESOLVED | Noted: 2024-01-04 | Resolved: 2024-07-01

## 2024-07-01 PROBLEM — M79.602 LEFT ARM PAIN: Status: RESOLVED | Noted: 2017-04-04 | Resolved: 2024-07-01

## 2024-07-01 PROBLEM — H25.11 NUCLEAR SCLEROTIC CATARACT OF RIGHT EYE: Status: RESOLVED | Noted: 2023-08-29 | Resolved: 2024-07-01

## 2024-07-01 PROBLEM — Z98.890 S/P PANNICULECTOMY: Status: RESOLVED | Noted: 2022-06-14 | Resolved: 2024-07-01

## 2024-07-01 PROBLEM — M75.41 IMPINGEMENT SYNDROME OF RIGHT SHOULDER: Status: RESOLVED | Noted: 2018-06-13 | Resolved: 2024-07-01

## 2024-07-01 PROCEDURE — 99999 PR PBB SHADOW E&M-EST. PATIENT-LVL I: CPT | Mod: PBBFAC,,, | Performed by: HOSPITALIST

## 2024-07-01 PROCEDURE — 99213 OFFICE O/P EST LOW 20 MIN: CPT | Mod: PBBFAC,25 | Performed by: NURSE PRACTITIONER

## 2024-07-01 PROCEDURE — 99999 PR PBB SHADOW E&M-EST. PATIENT-LVL III: CPT | Mod: PBBFAC,,, | Performed by: NURSE PRACTITIONER

## 2024-07-01 PROCEDURE — 71046 X-RAY EXAM CHEST 2 VIEWS: CPT | Mod: TC,PO

## 2024-07-01 PROCEDURE — 99211 OFF/OP EST MAY X REQ PHY/QHP: CPT | Mod: PBBFAC,25,27 | Performed by: HOSPITALIST

## 2024-07-01 PROCEDURE — 71046 X-RAY EXAM CHEST 2 VIEWS: CPT | Mod: 26,,, | Performed by: RADIOLOGY

## 2024-07-01 PROCEDURE — 99214 OFFICE O/P EST MOD 30 MIN: CPT | Mod: S$PBB,,, | Performed by: NURSE PRACTITIONER

## 2024-07-01 RX ORDER — OXYCODONE HYDROCHLORIDE 5 MG/1
10 TABLET ORAL
OUTPATIENT
Start: 2024-07-01

## 2024-07-01 RX ORDER — SODIUM CHLORIDE 0.9 % (FLUSH) 0.9 %
10 SYRINGE (ML) INJECTION
OUTPATIENT
Start: 2024-07-01

## 2024-07-01 RX ORDER — ACETAMINOPHEN 500 MG
1000 TABLET ORAL
OUTPATIENT
Start: 2024-07-01

## 2024-07-01 RX ORDER — FENTANYL CITRATE 50 UG/ML
25 INJECTION, SOLUTION INTRAMUSCULAR; INTRAVENOUS EVERY 5 MIN PRN
OUTPATIENT
Start: 2024-07-01

## 2024-07-01 RX ORDER — PREGABALIN 75 MG/1
75 CAPSULE ORAL NIGHTLY
OUTPATIENT
Start: 2024-07-01

## 2024-07-01 RX ORDER — FENTANYL CITRATE 50 UG/ML
100 INJECTION, SOLUTION INTRAMUSCULAR; INTRAVENOUS
OUTPATIENT
Start: 2024-07-01 | End: 2024-07-02

## 2024-07-01 RX ORDER — PREGABALIN 75 MG/1
75 CAPSULE ORAL
OUTPATIENT
Start: 2024-07-01

## 2024-07-01 RX ORDER — LIDOCAINE HYDROCHLORIDE 10 MG/ML
1 INJECTION, SOLUTION EPIDURAL; INFILTRATION; INTRACAUDAL; PERINEURAL
OUTPATIENT
Start: 2024-07-01

## 2024-07-01 RX ORDER — AMOXICILLIN 250 MG
1 CAPSULE ORAL 2 TIMES DAILY
OUTPATIENT
Start: 2024-07-01

## 2024-07-01 RX ORDER — CEFAZOLIN SODIUM 2 G/50ML
2 SOLUTION INTRAVENOUS
OUTPATIENT
Start: 2024-07-01 | End: 2024-07-02

## 2024-07-01 RX ORDER — SODIUM CHLORIDE 9 MG/ML
INJECTION, SOLUTION INTRAVENOUS CONTINUOUS
OUTPATIENT
Start: 2024-07-01 | End: 2024-07-02

## 2024-07-01 RX ORDER — MORPHINE SULFATE 2 MG/ML
2 INJECTION, SOLUTION INTRAMUSCULAR; INTRAVENOUS
OUTPATIENT
Start: 2024-07-01

## 2024-07-01 RX ORDER — MIDAZOLAM HYDROCHLORIDE 1 MG/ML
4 INJECTION, SOLUTION INTRAMUSCULAR; INTRAVENOUS
OUTPATIENT
Start: 2024-07-01 | End: 2024-07-02

## 2024-07-01 RX ORDER — POLYETHYLENE GLYCOL 3350 17 G/17G
17 POWDER, FOR SOLUTION ORAL DAILY
OUTPATIENT
Start: 2024-07-02

## 2024-07-01 RX ORDER — SODIUM CHLORIDE 9 MG/ML
INJECTION, SOLUTION INTRAVENOUS
OUTPATIENT
Start: 2024-07-01

## 2024-07-01 RX ORDER — CEFAZOLIN SODIUM 2 G/50ML
2 SOLUTION INTRAVENOUS
OUTPATIENT
Start: 2024-07-01

## 2024-07-01 RX ORDER — ASPIRIN 81 MG/1
81 TABLET ORAL 2 TIMES DAILY
OUTPATIENT
Start: 2024-07-01

## 2024-07-01 RX ORDER — ONDANSETRON HYDROCHLORIDE 2 MG/ML
4 INJECTION, SOLUTION INTRAVENOUS EVERY 8 HOURS PRN
OUTPATIENT
Start: 2024-07-01

## 2024-07-01 RX ORDER — MUPIROCIN 20 MG/G
1 OINTMENT TOPICAL 2 TIMES DAILY
OUTPATIENT
Start: 2024-07-01 | End: 2024-07-06

## 2024-07-01 RX ORDER — BISACODYL 10 MG/1
10 SUPPOSITORY RECTAL EVERY 12 HOURS PRN
OUTPATIENT
Start: 2024-07-01

## 2024-07-01 RX ORDER — OXYCODONE HYDROCHLORIDE 5 MG/1
5 TABLET ORAL
OUTPATIENT
Start: 2024-07-01

## 2024-07-01 RX ORDER — MUPIROCIN 20 MG/G
1 OINTMENT TOPICAL
OUTPATIENT
Start: 2024-07-01

## 2024-07-01 RX ORDER — CELECOXIB 200 MG/1
400 CAPSULE ORAL ONCE
OUTPATIENT
Start: 2024-07-01 | End: 2024-07-01

## 2024-07-01 RX ORDER — FAMOTIDINE 20 MG/1
20 TABLET, FILM COATED ORAL 2 TIMES DAILY
OUTPATIENT
Start: 2024-07-01

## 2024-07-01 RX ORDER — NALOXONE HCL 0.4 MG/ML
0.02 VIAL (ML) INJECTION
OUTPATIENT
Start: 2024-07-01

## 2024-07-01 RX ORDER — TALC
6 POWDER (GRAM) TOPICAL NIGHTLY PRN
OUTPATIENT
Start: 2024-07-01

## 2024-07-01 RX ORDER — METHOCARBAMOL 750 MG/1
750 TABLET, FILM COATED ORAL 3 TIMES DAILY
OUTPATIENT
Start: 2024-07-01

## 2024-07-01 RX ORDER — ACETAMINOPHEN 500 MG
1000 TABLET ORAL EVERY 6 HOURS
OUTPATIENT
Start: 2024-07-01

## 2024-07-01 RX ORDER — MUPIROCIN 20 MG/G
OINTMENT TOPICAL 2 TIMES DAILY
Qty: 30 G | Refills: 0 | Status: SHIPPED | OUTPATIENT
Start: 2024-07-16 | End: 2024-07-21

## 2024-07-01 RX ORDER — CELECOXIB 200 MG/1
200 CAPSULE ORAL DAILY
OUTPATIENT
Start: 2024-07-02

## 2024-07-01 RX ORDER — PROCHLORPERAZINE EDISYLATE 5 MG/ML
5 INJECTION INTRAMUSCULAR; INTRAVENOUS EVERY 6 HOURS PRN
OUTPATIENT
Start: 2024-07-01

## 2024-07-01 NOTE — ASSESSMENT & PLAN NOTE
Doing good    

## 2024-07-01 NOTE — H&P (VIEW-ONLY)
CC:  Right knee pain    Alivia Thomas is a 58 y.o. female with history of Right knee pain. Pain is worse with activity and weight bearing.  Patient has experienced interference of activities of daily living due to decreased range of motion and an increase in joint pain and swelling.  Patient has failed non-operative treatment including NSAIDs and activity modification.  Alivia Thomas currently ambulates independently.     Relevant medical conditions of significance in perioperative period:  Type 2 DM- on medication managed by pcp  HTN- on medication managed by pcp  Chronic eliquis use for afib with h/o cva- managed by pcp     Given documented medical comorbidities including those listed above and based off of CMS criteria patient would meet inpatient admission status guidelines. This case has been approved as inpatient.     Past Medical History:   Diagnosis Date    Acute right MCA stroke 01/04/2024    Allergy     Anemia     Asthma     Bilateral shoulder bursitis     Cervical stenosis of spine     Chronic pain     DDD (degenerative disc disease), cervical     Depression 01/28/2019    Diabetes mellitus type II     DJD (degenerative joint disease) of knee 06/19/2014    Facet arthritis of lumbar region 12/17/2015    Facet syndrome 12/17/2015    GERD (gastroesophageal reflux disease)     Heartburn     Hyperlipidemia     Hypertension     Morbid obesity     Neuromuscular disorder     PADDY (obstructive sleep apnea)     Paroxysmal atrial fibrillation 03/19/2024    Proteinuria     Right carpal tunnel syndrome     Sacroiliitis 06/13/2018    Sacroiliitis     Sleep apnea     Uterine fibroid        Past Surgical History:   Procedure Laterality Date    CARPAL TUNNEL RELEASE      CARPAL TUNNEL RELEASE  1980s    left    CARPAL TUNNEL RELEASE  2012    right    CATARACT EXTRACTION W/  INTRAOCULAR LENS IMPLANT Left 08/08/2023    DR. STOKES    CATARACT EXTRACTION W/  INTRAOCULAR LENS IMPLANT Right 08/29/2023         COLONOSCOPY N/A 06/15/2020    Procedure: COLONOSCOPY;  Surgeon: Jc Koo MD;  Location: [facility] (4TH FLR);  Service: Endoscopy;  Laterality: N/A;  [facility]  pt updated on drop off location and no visitor policy-    EPIDURAL STEROID INJECTION N/A 07/24/2023    Procedure: INJECTION, STEROID, EPIDURAL, L5/S1 SOONER DATE;  Surgeon: Israel Hurtado MD;  Location: [facility], [place], operated by [facility] PAIN MGT;  Service: Pain Management;  Laterality: N/A;    ESOPHAGOGASTRODUODENOSCOPY N/A 03/27/2019    Procedure: EGD (ESOPHAGOGASTRODUODENOSCOPY);  Surgeon: Robbin Bar MD;  Location: [facility] (2ND FLR);  Service: Endoscopy;  Laterality: N/A;  BMI 61.3/2nd floor case/svn    INJECTION OF JOINT Bilateral 06/09/2020    Procedure: INJECTION, JOINT, BILATERAL SI;  Surgeon: Lina Wagner MD;  Location: [facility], [place], operated [facility] PAIN T;  Service: Pain Management;  Laterality: Bilateral;  B/L SI Joint Injection    INJECTION OF JOINT Bilateral 05/11/2021    Procedure: INJECTION, JOINT, SACROILIAC (SI) need consent;  Surgeon: Lina Wagner MD;  Location: [facility], [place], operated [facility] PAIN MGT;  Service: Pain Management;  Laterality: Bilateral;    INJECTION OF JOINT Bilateral 5/16/2024    Procedure: INJECTION, JOINT BILATERAL SI AND GTB;  Surgeon: Israel Hurtado MD;  Location: [facility] MGT;  Service: Pain Management;  Laterality: Bilateral;  [phone]  2 WK F/U BENJI    JOINT REPLACEMENT Bilateral     with 2 revisions on rt    KNEE SURGERY  03/2010    orthroscope    KNEE SURGERY  06/19/2014    left TKR    LAPAROSCOPIC SLEEVE GASTRECTOMY N/A 08/28/2019    Procedure: GASTRECTOMY, SLEEVE, LAPAROSCOPIC with intraop EGD;  Surgeon: Heriberto Clements MD;  Location: [facility] OR 2ND FLR;  Service: General;  Laterality: N/A;    PANNICULECTOMY N/A 06/14/2022    Procedure: PANNICULECTOMY;  Surgeon: Rhett Gray MD;  Location: [facility] OR 2ND FLR;  Service: Plastics;  Laterality: N/A;    RADIOFREQUENCY ABLATION Right 07/09/2019    Procedure:  RADIOFREQUENCY ABLATION;  Surgeon: Lina Wagner MD;  Location: [facility] PAIN MGT;  Service: Pain Management;  Laterality: Right;  RIGHT RFA L2,3,4,5  2 of 2    RADIOFREQUENCY ABLATION Left 12/19/2019    Procedure: RADIOFREQUENCY ABLATION, LEFT L2-L3-L4-L5 MEDIAL BRANCH 1 OF 2;  Surgeon: Lina Wagner MD;  Location: [facility] PAIN MGT;  Service: Pain Management;  Laterality: Left;    RADIOFREQUENCY ABLATION Right 12/31/2019    Procedure: RADIOFREQUENCY ABLATION, RIGHT L2-L3-L4-L5  2 OF 2;  Surgeon: Lina Wagner MD;  Location: [facility] PAIN MGT;  Service: Pain Management;  Laterality: Right;    RADIOFREQUENCY ABLATION Right 10/13/2020    Procedure: RADIOFREQUENCY ABLATION, Genicular Cooled 1 of 2;  Surgeon: Lina Wagner MD;  Location: [facility] PAIN MGT;  Service: Pain Management;  Laterality: Right;    RADIOFREQUENCY ABLATION Left 11/03/2020    Procedure: RADIOFREQUENCY ABLATION, GENICULAR COOLED  2 OF 2;  Surgeon: Lina Wagner MD;  Location: [facility] PAIN MGT;  Service: Pain Management;  Laterality: Left;    RADIOFREQUENCY ABLATION Left 12/15/2020    Procedure: RADIOFREQUENCY ABLATION LEFT L2,3,4,5 1 of 2;  Surgeon: Lina Wagner MD;  Location: [facility] PAIN MGT;  Service: Pain Management;  Laterality: Left;    RADIOFREQUENCY ABLATION Right 12/29/2020    Procedure: RADIOFREQUENCY ABLATION RIGHT L2,3,4,5 2 of 2;  Surgeon: Lina Wagner MD;  Location: [facility] PAIN MGT;  Service: Pain Management;  Laterality: Right;    RADIOFREQUENCY ABLATION Left 06/29/2021    Procedure: RADIOFREQUENCY ABLATION GENICULAR COOLED;  Surgeon: Lina Wagner MD;  Location: [facility] PAIN MGT;  Service: Pain Management;  Laterality: Left;  1 of 2    RADIOFREQUENCY ABLATION Right 07/13/2021    Procedure: RADIOFREQUENCY ABLATION GENICULAR;  Surgeon: Lina Wagner MD;  Location: [facility] PAIN MGT;  Service: Pain Management;  Laterality: Right;  2 of 2    RADIOFREQUENCY ABLATION Right 08/24/2021    Procedure: RADIOFREQUENCY ABLATION,  L2-L3-L4-L5 MEDIAL BRANCH 1 OF 2;  Surgeon: Lina Wagner MD;  [facility];  Service: Pain Management;  Laterality: Right;    RADIOFREQUENCY ABLATION Left 09/11/2021    Procedure: RADIOFREQUENCY ABLATION, L2-L3-L4-L5 MEDIAL BR ANCH 2 OF 2;  Surgeon: Lina Wagner MD;  Location: [facility] PAIN MGT;  Service: Pain Management;  Laterality: Left;    RADIOFREQUENCY ABLATION Right 03/07/2022    Procedure: RADIOFREQUENCY ABLATION RIGHT L3,4,5 1 of 2, needs consent;  Surgeon: Israel Hurtado MD;  Location: [facility] PAIN MGT;  Service: Pain Management;  Laterality: Right;    RADIOFREQUENCY ABLATION Left 03/21/2022    Procedure: RADIOFREQUENCY ABLATION RIGHT L3,4,5 2 of 2, needs consent;  Surgeon: Israel Hurtado MD;  Location: [facility] PAIN MGT;  Service: Pain Management;  Laterality: Left;    RADIOFREQUENCY ABLATION Right 05/09/2022    Procedure: RADIOFREQUENCY ABLATION RIGHT GENICULAR COOLED 1 of 2;  Surgeon: Israel Hurtado MD;  Location: [facility] PAIN MGT;  Service: Pain Management;  Laterality: Right;    RADIOFREQUENCY ABLATION Left 05/23/2022    Procedure: RADIOFREQUENCY ABLATION LEFT GENICULAR COOLED  2 of 2;  Surgeon: Israel Hurtado MD;  Location: [facility] PAIN MGT;  Service: Pain Management;  Laterality: Left;    RADIOFREQUENCY ABLATION Right 12/12/2022    Procedure: RADIOFREQUENCY ABLATION RIGHT GENICULAR COOLED  ONE OF TWO  NEEDS CONSENT;  Surgeon: Israel Hurtado MD;  Location: [facility] PAIN MGT;  Service: Pain Management;  Laterality: Right;    RADIOFREQUENCY ABLATION Left 01/09/2023    Procedure: RADIOFREQUENCY ABLATION LEFT GENICULAR COOLED  TWO OF TWO NEEDS CONSENT;  Surgeon: Israel Hurtado MD;  Location: [facility] PAIN MGT;  Service: Pain Management;  Laterality: Left;    RADIOFREQUENCY ABLATION Right 03/06/2023    Procedure: RADIOFREQUENCY ABLATION RIGHT L3,L4,L5;  Surgeon: Israel Hurtado MD;  Location: [facility] PAIN MGT;  Service: Pain Management;  Laterality: Right;    RADIOFREQUENCY ABLATION Left 05/22/2023    Procedure:  RADIOFREQUENCY ABLATION LEFT L3,L4,L5;  Surgeon: Israel Hurtado MD;  Location: [facility] PAIN MGT;  Service: Pain Management;  Laterality: Left;  4/3 LM TO R/S    RADIOFREQUENCY ABLATION Right 11/27/2023    Procedure: RADIOFREQUENCY ABLATION RIGHT L3, 4, 5 1 OF 3;  Surgeon: Israel Hurtado MD;  Location: [facility] PAIN MGT;  Service: Pain Management;  Laterality: Right;    RADIOFREQUENCY ABLATION Right 01/22/2024    Procedure: RADIOFREQUENCY ABLATION RIGHT GENICULAR 3 NERVES 3 OF 3;  Surgeon: Israel Hurtado MD;  Location: [facility] PAIN MGT;  Service: Pain Management;  Laterality: Right;    RADIOFREQUENCY ABLATION Left 02/12/2024    Procedure: RADIOFREQUENCY ABLATION LEFT L3, 4, 5;  Surgeon: Israel Hurtado MD;  Location: [facility] PAIN MGT;  Service: Pain Management;  Laterality: Left;  [phone]    RADIOFREQUENCY ABLATION OF LUMBAR MEDIAL BRANCH NERVE AT SINGLE LEVEL Left 06/26/2018    Procedure: RADIOFREQUENCY ABLATION, NERVE, MEDIAL BRANCH, LUMBAR, 1 LEVEL;  Surgeon: Lina Wagner MD;  Location: [facility] PAIN MGT;  Service: Pain Management;  Laterality: Left;  Left Cooled RFA @ L2,3,4,5  [phone]  with Sedation    1 of 2    RADIOFREQUENCY ABLATION OF LUMBAR MEDIAL BRANCH NERVE AT SINGLE LEVEL Right 07/10/2018    Procedure: RADIOFREQUENCY ABLATION, NERVE, MEDIAL BRANCH, LUMBAR, 1 LEVEL;  Surgeon: Lina Wagner MD;  Location: [facility] PAIN MGT;  Service: Pain Management;  Laterality: Right;  RIght Cooled RFA @ L2,3,4,5  [phone] with Sedation    2 of 2    TRIGGER FINGER RELEASE Right 2017    TRIGGER FINGER RELEASE Left 07/29/2019    Procedure: RELEASE, TRIGGER FINGER, Left Thumb;  Surgeon: Velma Garcia MD;  Location: [facility] OR;  Service: Orthopedics;  Laterality: Left;  Local w/ MAC       Family History   Problem Relation Name Age of Onset    Diabetes Mother      Cataracts Mother      Diabetes Father      Cataracts Father      Hypertension Sister      Diabetes Sister      No Known Problems Sister      Cancer Sister           lymphoma     Coronary artery disease Brother      Diabetes Brother      Diabetes Brother      Hypotension Brother      Kidney failure Brother      Hypotension Brother      Amblyopia Neg Hx      Blindness Neg Hx      Glaucoma Neg Hx      Macular degeneration Neg Hx      Retinal detachment Neg Hx      Strabismus Neg Hx      Stroke Neg Hx      Thyroid disease Neg Hx      Anesthesia problems Neg Hx         Review of patient's allergies indicates:   Allergen Reactions    Strawberry Anaphylaxis         Current Outpatient Medications:     albuterol (VENTOLIN HFA) 90 mcg/actuation inhaler, INHALE TWO PUFFS INTO LUNGS EVERY 4 TO 6 HOURS AS NEEDED FOR SHORTNESS OF BREATH AND FOR WHEEZING, Disp: 18 g, Rfl: 1    allopurinoL (ZYLOPRIM) 300 MG tablet, Take 1 tablet (300 mg total) by mouth 2 (two) times daily., Disp: 180 tablet, Rfl: 3    apixaban (ELIQUIS) 5 mg Tab, Take 1 tablet (5 mg total) by mouth 2 (two) times daily., Disp: 60 tablet, Rfl: 6    atorvastatin (LIPITOR) 80 MG tablet, Take 1 tablet (80 mg total) by mouth once daily., Disp: 90 tablet, Rfl: 3    b complex vitamins tablet, Take 1 tablet by mouth every other day. , Disp: , Rfl:     calcium citrate/vitamin D2 (ISIAH-CITRATE ORAL), Take 500 mg by mouth 2 (two) times daily., Disp: , Rfl:     colchicine (MITIGARE) 0.6 mg Cap, One daily as needed for gout flare, Disp: 90 capsule, Rfl: 1    cyanocobalamin (VITAMIN B-12) 1000 MCG tablet, Take 100 mcg by mouth every morning., Disp: , Rfl:     diclofenac sodium (VOLTAREN) 1 % Gel, Apply 2 g topically 4 (four) times daily as needed. To painful area on the feet., Disp: 500 g, Rfl: 5    fluticasone propionate (FLONASE) 50 mcg/actuation nasal spray, 1 SPRAY BY EACH NOSTRIL ROUTE 2 TIMES DAILY AS NEEDED FOR RHINITIS., Disp: 48 g, Rfl: 3    LIDOcaine (XYLOCAINE) 5 % Oint ointment, APPLY TOPICALLY AS NEEDED., Disp: 35.44 g, Rfl: 6    metoprolol succinate (TOPROL-XL) 25 MG 24 hr tablet, Take 1 tablet (25 mg total) by mouth once  daily., Disp: 90 tablet, Rfl: 3    MULTIVIT-IRON-MIN-FOLIC ACID 3,500-18-0.4 UNIT-MG-MG ORAL CHEW, Take 1 tablet by mouth 2 (two) times daily. , Disp: , Rfl:     nystatin (MYCOSTATIN) powder, Apply topically 2 (two) times daily., Disp: 60 g, Rfl: 3    omeprazole (PRILOSEC) 20 MG capsule, TAKE 1 CAPSULE (20 MG TOTAL) BY MOUTH EVERY MORNING., Disp: 90 capsule, Rfl: 3    oxybutynin (DITROPAN-XL) 5 MG TR24, Take 1 tablet (5 mg total) by mouth once daily., Disp: 90 tablet, Rfl: 3    oxyCODONE-acetaminophen (PERCOCET)  mg per tablet, Take 1 tablet by mouth every 8 (eight) hours as needed for Pain., Disp: 90 tablet, Rfl: 0    prednisoLONE acetate (PRED FORTE) 1 % DrpS, Place 1 drop into the right eye 3 (three) times daily., Disp: , Rfl:     semaglutide (OZEMPIC) 1 mg/dose (4 mg/3 mL), Inject 1 mg into the skin every 7 days., Disp: 3 mL, Rfl: 11    tiZANidine (ZANAFLEX) 4 MG tablet, TAKE 1 TABLET (4 MG TOTAL) BY MOUTH EVERY 8 (EIGHT) HOURS AS NEEDED (MUSCLE PAIN)., Disp: 90 tablet, Rfl: 2    urea (CARMOL) 40 % Crea, Apply topically once daily. To dry skin on the feet., Disp: 120 g, Rfl: 5    vitamin D 185 MG Tab, Take 5,000 mg by mouth every morning. , Disp: , Rfl:     aspirin (ECOTRIN) 81 MG EC tablet, Take 81 mg by mouth once daily. Start 4 days prior to surgery and take until Eliquis restarted per Dr. Hathaway, Disp: , Rfl:     FLUoxetine 40 MG capsule, Take 1 capsule (40 mg total) by mouth once daily., Disp: 90 capsule, Rfl: 3    tretinoin microspheres (RETIN-A MICRO PUMP) 0.08 % GlwP, Apply to affected area daily, Disp: 50 g, Rfl: 0  No current facility-administered medications for this visit.    Facility-Administered Medications Ordered in Other Visits:     0.9%  NaCl infusion, , Intravenous, Continuous, Chaiban, Gassan M., MD, Last Rate: 25 mL/hr at 12/15/20 1506, 25 mL/hr at 12/15/20 1506    0.9%  NaCl infusion, , Intravenous, Continuous, Ciro Cuhng MD    0.9%  NaCl infusion, , Intravenous, Continuous,  "Santos Barron MD    Review of Systems:  Constitutional: no fever or chills  Eyes: no visual changes  ENT: no nasal congestion or sore throat  Respiratory: no cough or shortness of breath  Cardiovascular: no chest pain or palpitations  Gastrointestinal: no nausea or vomiting, tolerating diet  Genitourinary: no hematuria or dysuria  Integument/Breast: no rash or pruritis  Hematologic/Lymphatic: no easy bruising or lymphadenopathy  Musculoskeletal: positive for knee pain  Neurological: no seizures or tremors  Behavioral/Psych: no auditory or visual hallucinations  Endocrine: no heat or cold intolerance    PE:  Ht 5' 5" (1.651 m)   Wt 112.4 kg (247 lb 12.8 oz)   LMP 06/26/2018   BMI 41.24 kg/m²   General: Pleasant, cooperative, NAD   Gait: antalgic  HEENT: NCAT, sclera nonicteric   Lungs: Respirations clear bilaterally; equal and unlabored.   CV: S1S2; 2+ bilateral upper and lower extremity pulses.   Skin: Intact throughout with no rashes, erythema, or lesions  Extremities: No LE edema,  no erythema or warmth of the skin in either lower extremity.    Right knee exam:  Knee Range of Motion:normal, pain with passive range of motion  Effusion:none  Condition of skin:intact  Location of tenderness:Medial joint line   Strength:normal  Stability: stable to testing    Hip Examination: painless PROM of hip     Radiographs: Radiographs reveal prosthetic joint in position and alignment.     Knee Alignment: normal    Diagnosis: prosthetic joint failure, right knee    Plan: Right total knee revision arthroplasty    Due to the serious nature of total joint infection and the high prevalence of community acquired MRSA, vancomycin will be used perioperatively.            "

## 2024-07-01 NOTE — ASSESSMENT & PLAN NOTE
Gluteal area  2005/2006    Based on chart review / Patient has a reported history of Staph infection   In an attempt to prevent Surgical site infection , with the up coming surgery , suggest the following      Intra nasal Bactroban for 5 days ( start 2 days pre op   )   Instructions for use of  nasal Bactroban    Generic ointment or generic cream (30gram tube) - place a drop on a q tip , insert into to very tip of the nose only , then press on the nose gently to distribute.     Or Single use tube-1 gram dose - half tube in each nostril bid for 5 days .

## 2024-07-01 NOTE — ASSESSMENT & PLAN NOTE
Ozempic seems to be helping with weight loss  She wants to lose more weight which I have encouraged that she might be able to lose weight with increased mobility after joint revision surgery  She would gastric sleeve done in the past 2019     Weight related conditions     Known to have     HTN  Type 2 Diabetes   Gout   Hyperlipidemia   Sleep apnea   Acid reflux   Osteoarthritis    Not troubled with / Not known to have     Fatty liver     Encouraged maintaining healthy weight for improved health

## 2024-07-01 NOTE — OUTPATIENT SUBJECTIVE & OBJECTIVE
Outpatient Subjective & Objective     Chief complaint-Preoperative evaluation, Perioperative Medical management, complication reduction plan     Active cardiac conditions- none    Revised cardiac risk index predictors- history of cerebrovascular disease    Functional capacity -Examples of physical activity  can take 1 flight of stairs----- She can undertake all the above activities without  chest pain,chest tightness, Shortness of breath ,dizziness,lightheadedness making her exercise tolerance more,  than 4 Mets.   Winded on exertion, not new ,stable over time- attributes to niece and possibly weight-breathing better since lost weight     Review of Systems   Constitutional:  Negative for chills and fever.   HENT:          Sleep anea   Eyes:         No new visual changes   Respiratory:          No cough , phlegm    No Hemoptysis   Cardiovascular:         As noted   Gastrointestinal:         No overt GI/ blood losses  Bowel movements- Regular   Endocrine:        Prednisone use > 20 mg daily for 3 weeks- None   Genitourinary:  Negative for dysuria.        No urinary hesitancy    Musculoskeletal:         As above      Skin:  Negative for rash.   Neurological:  Negative for syncope.        No unilateral weakness   Hematological:         Current use of Anticoagulants  Yes               No PONV bleeding, cardiac problems with previous surgeries/procedures.  Medications and Allergies reviewed in epic.   She had shortness a breath after her panniculectomy surgery that she attributes to the anesthesia and she has discussed this with the anesthesiology care provider  FH- No anesthesia,bleeding / venous thrombosis , disease in family      Physical Exam          Physical Exam  Constitutional-  General appearance-Conscious,Coherent  Eyes- No conjunctival icterus,pupils  round  and reactive to light   ENT-Oral cavity- moist    , Hearing grossly normal   Neck- No thyromegaly ,Trachea -central, No jugular venous distension,   No  Carotid Bruit   Cardiovascular -Heart Sounds- Normal ,  no murmur , and  occasional irregularity suggestive of ectopy   , No gallop rhythm   Respiratory - Normal Respiratory Effort, Normal breath sounds,  no wheeze , and  no forced expiratory wheeze    Peripheral pitting pedal edema-- none , no calf pain   Gastrointestinal -Soft abdomen, No palpable masses, Non Tender,Liver,Spleen not palpable. No-- free fluid and shifting dullness  Musculoskeletal- No finger Clubbing. Strength grossly normal   Lymphatic-No Palpable cervical, axillary,Inguinal lymphadenopathy   Psychiatric - normal effect,Orientation  Rt Dorsalis pedis pulses-palpable    Lt Dorsalis pedis pulses- palpable   Rt Posterior tibial pulses -palpable   Left posterior tibial pulses -palpable   Miscellaneous -  no renal bruit   Investigations  Lab and Imaging have been reviewed in epic.    Review of Medicine tests    EKG-  from--3/26/2024  It was reported to be showing -Sinus rhythm with premature atrial complexes   Right atrial enlargement   Borderline Abnormal ECG   When compared with ECG of 04-JAN-2024 20:28,   premature atrial complexes are present     2019    The relative PET images are normal showing no clinically significant regional resting or stress induced perfusion defects.  Gated perfusion images showed an ejection fraction of 77 % at rest and 80 % during stress. Normal is >51%.  Wall motion was normal at rest and stress.  LV cavity size is normal at rest and normal at stress.  The EKG portion of this study is negative for myocardial ischemia.  Arrhythmias during stress: rare PACs.  The patient reported headache during the stress test.    Review of clinical lab tests:  Lab Results   Component Value Date    CREATININE 0.7 03/18/2024    HGB 12.5 03/18/2024     03/18/2024             Review of old records- Was done and information gathered regards to events leading to surgery and health conditions of significance in the perioperative  period.    Outpatient Subjective & Objective

## 2024-07-01 NOTE — ASSESSMENT & PLAN NOTE
Not using  CPAP  Suggested bringing for hospital use .   Informed the risk of worsening sleep apnea in the perioperative period and suggest using CPAP use any time in 24 hrs ( day or night )for planned sleep     I suggest  caution with usage of medication that can cause respiratory suppression in the perioperative period    Avoidance of  supine sleep, weight gain , alcoholic beverages , care with , sedative , CNS depressant use indicated  since all of these can worsen PADDY        I suggest  caution with usage of medication that can cause respiratory suppression in the perioperative period  potential ramifications of untreated sleep apnea, which could include daytime sleepiness, hypertension, heart disease and stroke were discussed    Discussed the relation between sleep apnea and atrial fibrillation

## 2024-07-01 NOTE — ASSESSMENT & PLAN NOTE
She has type 2 diabetes and is currently taking Ozempic    Capillary glucose check-none    Diabetes Complications     Microvascular     Not known to have   Diabetes affecting the eyes, Kidneys   No tingling numbness of hands and feet  No reported open areas on the feet   Feet care suggested     Macrovascular     Had  stroke jan 2024  Not known to have CAD    She takes Ozempic on a Monday and I suggested to skip the Ozempic dose on 15th  Suggested skipping one dose prior to surgery and hold that while on opioid pain medication , because they can slow gut emptying        Diabetes Mellitus-I suggest monitoring the glucose in the perioperative period ( Before meals and bed time,if the patient is on oral feeds or every 6 hourly ,if the patient is NPO )  Blood glucose target in hospitalized patients is 140-180. Oral Hypoglycemic agents are generally avoided during the hospital stay . If glucose is consistently elevated ,I suggest using basal ,prandial Insulin regimen to control the glucose , as elevated glucose can be associated with adverse surgical out comes. Please consider involving Hospital Medicine or Endocrinology ,if any help is needed with Glucose control. Patient will be instructed based on the pre op clinic guidelines  about adjustment of diabetic treatment (If applicable )  considering the NPO status for Surgery      I had educated that uncontrolled DM can cause post op complications,risk of infection, wound healing problem,increased length of stay in hospital and its associated complications.I suggest exercise as much as possible and follow diabetic diet

## 2024-07-01 NOTE — ASSESSMENT & PLAN NOTE
Does not sound Cardiac   She is doing good from a reflux standpoint    GERD-  I suggest continuation of the Proton pump inhibitor in the perioperative period . I suggest aspiration precautions

## 2024-07-01 NOTE — ASSESSMENT & PLAN NOTE
She has not needing albuterol as much, suggesting that her asthma control is doing good  She has not on scheduled medication for asthma

## 2024-07-01 NOTE — ASSESSMENT & PLAN NOTE
Gout, allopurinol colchicine  She likes eating meat on a regular basis  She is on colchicine on a regular basis and I suggested not to take colchicine on the morning of surgery    Gout -  I suggest continuation of the maintenance treatment for gout and to keep the patient adequately hydrated.  Please note that surgical stress ,dehydration can precipitate acute gout

## 2024-07-01 NOTE — ASSESSMENT & PLAN NOTE
Both knees  She is on Percocet 3 times a day through pain management    Chronic continuous opioid use- In view of the opioid use, the patient may have opioid tolerance . I suggest considering the possibility of opioid tolerance  in planning post operative pain control     Discussed possibly reducing the opioid use in preparation for surgery , so that it reduces the opioid tolerance which helps post op pain control     Suggested letting pain management/ Prescribing provider  know about the up coming surgery      Suggest avoidance of abrupt stopping of opioid medication as it can cause opioid withdrawal     She is unsure if she can reduce the pain medication for surgery    She does not seem to be constipated from pain medication

## 2024-07-01 NOTE — ASSESSMENT & PLAN NOTE
She is not troubled with the neck  No neck pain or numbness down the arms  MRI 2018- Mild multilevel cervical spondylosis with mild spinal canal stenosis and mild right neuroforaminal narrowing at C4-5 and C5-6.

## 2024-07-01 NOTE — PROGRESS NOTES
Alivia Thomas is a 58 y.o. year old here today for pre surgery optimization visit  in preparation for a Right total knee arthroplasty to be performed by Dr. Swan on 7/18/24.  she was last seen and treated in the clinic on 5/27/2024. she will be medically optimized by the pre op center. There has been no significant change in medical status since last visit. No fever, chills, malaise, or unexplained weight change.      Allergies, Medications, past medical and surgical history reviewed.    Focused examination performed.    Patient declined to see surgeon today. All questions answered. Patient encouraged to call with questions. Contact information given.     Pre, ivy, and post operative procedures and expectations discussed. Goals of successful surgery reviewed and include manageable pain levels, surgical site free of infection, medication management, and ambulation with PT/OT assistance. Healthy weight management discussed with patient and caregiver who were receptive to eduction of healthy diet and activity. No other necessary lifestyle changes identified. Educated patient about signs and symptoms of infection, medication management, anticoagulation therapy, risk of tobacco and alcohol use, and self-care to promote healing. Surgical guide given for future reference. Hibiclens given to patient with instructions. All questions were answered.     Alivia Thomas verbalized an understanding to the education and goals. Patient has displayed readiness to engage in care and is ready to proceed with surgery.  Patient reports her wife is able and ready to provide assistance at home after discharge, but due to her working, she won't be able to drive her to outpatient PT, so home health will be ordered.    Surgical and blood consents signed.    DME will be arranged. The mobility limitation cannot be sufficiently resolved by the use of a cane. Patient's functional mobility deficit can be sufficiently resolved with the  "use of a (Rolling Walker or Walker). Patient's mobility limitation significantly impairs their ability to participate in one of more activities of daily living. The use of a (Rolling Walker or Walker) will significantly improve the patient's ability to participate in MRADLS and the patient will use it on regular basis in the home."     Alivia Thomas will contact us if there are any questions, concerns, or changes in medical status prior to surgery.       Patient has discussed discharge planning with surgeon. Patient will be discharged to home following surgery.   patient will be scheduled with Home Health during hospitalization.      30 minutes of time was spent on patient education, review of records, templating, H&P, , appointment scheduling and optimizing patient for surgery.      "

## 2024-07-01 NOTE — HPI
History of present illness- I had the pleasure of meeting this pleasant 58 y.o. lady in the pre op clinic prior to her elective Orthopedic surgery. The patient is new to me .   Offered to have family to be on the phone during the consultation      I have obtained the history by speaking to the patient and by reviewing the electronic health records.    Events leading up to surgery / History of presenting illness -    She had a right-sided total knee replacement surgery done around 2009/2010 and had required revision surgeries  She had no previous right knee infection    She has been troubled with severe  Rt knee  pain for long time  .   Pain increases with activity and decreases with pain medication.      Relevant health conditions of significance for the perioperative period/ History of presenting illness -    Health conditions of significance for the perioperative period      Opioid use  Asthma  BMI 41.24   DM2  GERD  HLD  PADDY   AFIB - apixaban   MCA Stroke 1/2024 (no residual deficits)   Gout,   Ozempic Use, Monday    Gastric Sleeve  History of COVID 2020    Not known to have Thyroid problem, Kidney problem, deep vein thrombosis, pulmonary embolism, fatty liver , vascular stenting , tobacco smoking    Lives with wife  Single story house   Pets 1 dog  Children - none  Pregnancies - none  Has help post op

## 2024-07-01 NOTE — H&P
CC:  Right knee pain    Alivia Thomas is a 58 y.o. female with history of Right knee pain. Pain is worse with activity and weight bearing.  Patient has experienced interference of activities of daily living due to decreased range of motion and an increase in joint pain and swelling.  Patient has failed non-operative treatment including NSAIDs and activity modification.  Alivia Thomas currently ambulates independently.     Relevant medical conditions of significance in perioperative period:  Type 2 DM- on medication managed by pcp  HTN- on medication managed by pcp  Chronic eliquis use for afib with h/o cva- managed by pcp     Given documented medical comorbidities including those listed above and based off of CMS criteria patient would meet inpatient admission status guidelines. This case has been approved as inpatient.     Past Medical History:   Diagnosis Date    Acute right MCA stroke 01/04/2024    Allergy     Anemia     Asthma     Bilateral shoulder bursitis     Cervical stenosis of spine     Chronic pain     DDD (degenerative disc disease), cervical     Depression 01/28/2019    Diabetes mellitus type II     DJD (degenerative joint disease) of knee 06/19/2014    Facet arthritis of lumbar region 12/17/2015    Facet syndrome 12/17/2015    GERD (gastroesophageal reflux disease)     Heartburn     Hyperlipidemia     Hypertension     Morbid obesity     Neuromuscular disorder     PADDY (obstructive sleep apnea)     Paroxysmal atrial fibrillation 03/19/2024    Proteinuria     Right carpal tunnel syndrome     Sacroiliitis 06/13/2018    Sacroiliitis     Sleep apnea     Uterine fibroid        Past Surgical History:   Procedure Laterality Date    CARPAL TUNNEL RELEASE      CARPAL TUNNEL RELEASE  1980s    left    CARPAL TUNNEL RELEASE  2012    right    CATARACT EXTRACTION W/  INTRAOCULAR LENS IMPLANT Left 08/08/2023    DR. STOKES    CATARACT EXTRACTION W/  INTRAOCULAR LENS IMPLANT Right 08/29/2023         COLONOSCOPY N/A 06/15/2020    Procedure: COLONOSCOPY;  Surgeon: Jc Koo MD;  Location: [facility] (4TH FLR);  Service: Endoscopy;  Laterality: N/A;  [facility]  pt updated on drop off location and no visitor policy-    EPIDURAL STEROID INJECTION N/A 07/24/2023    Procedure: INJECTION, STEROID, EPIDURAL, L5/S1 SOONER DATE;  Surgeon: Israel Hurtado MD;  Location: [facility] PAIN MGT;  Service: Pain Management;  Laterality: N/A;    ESOPHAGOGASTRODUODENOSCOPY N/A 03/27/2019    Procedure: EGD (ESOPHAGOGASTRODUODENOSCOPY);  Surgeon: Robbin Bar MD;  Location: [facility] (2ND FLR);  Service: Endoscopy;  Laterality: N/A;  BMI 61.3/2nd floor case/svn    INJECTION OF JOINT Bilateral 06/09/2020    Procedure: INJECTION, JOINT, BILATERAL SI;  Surgeon: Lina Wagner MD;  Location: [facility] PAIN T;  Service: Pain Management;  Laterality: Bilateral;  B/L SI Joint Injection    INJECTION OF JOINT Bilateral 05/11/2021    Procedure: INJECTION, JOINT, SACROILIAC (SI) need consent;  Surgeon: Lina Wagner MD;  Location: [facility] PAIN MGT;  Service: Pain Management;  Laterality: Bilateral;    INJECTION OF JOINT Bilateral 5/16/2024    Procedure: INJECTION, JOINT BILATERAL SI AND GTB;  Surgeon: Israel Hurtado MD;  Location: [facility] MGT;  Service: Pain Management;  Laterality: Bilateral;  [phone]  2 WK F/U BENJI    JOINT REPLACEMENT Bilateral     with 2 revisions on rt    KNEE SURGERY  03/2010    orthroscope    KNEE SURGERY  06/19/2014    left TKR    LAPAROSCOPIC SLEEVE GASTRECTOMY N/A 08/28/2019    Procedure: GASTRECTOMY, SLEEVE, LAPAROSCOPIC with intraop EGD;  Surgeon: Heriberto Clements MD;  Location: [facility] OR 2ND FLR;  Service: General;  Laterality: N/A;    PANNICULECTOMY N/A 06/14/2022    Procedure: PANNICULECTOMY;  Surgeon: Rhett Gray MD;  Location: [facility] OR 2ND FLR;  Service: Plastics;  Laterality: N/A;    RADIOFREQUENCY ABLATION Right 07/09/2019    Procedure:  RADIOFREQUENCY ABLATION;  Surgeon: Lina Wagner MD;  Location: [facility] PAIN MGT;  Service: Pain Management;  Laterality: Right;  RIGHT RFA L2,3,4,5  2 of 2    RADIOFREQUENCY ABLATION Left 12/19/2019    Procedure: RADIOFREQUENCY ABLATION, LEFT L2-L3-L4-L5 MEDIAL BRANCH 1 OF 2;  Surgeon: Lina Wagner MD;  Location: [facility] PAIN MGT;  Service: Pain Management;  Laterality: Left;    RADIOFREQUENCY ABLATION Right 12/31/2019    Procedure: RADIOFREQUENCY ABLATION, RIGHT L2-L3-L4-L5  2 OF 2;  Surgeon: Lina Wagner MD;  Location: [facility] PAIN MGT;  Service: Pain Management;  Laterality: Right;    RADIOFREQUENCY ABLATION Right 10/13/2020    Procedure: RADIOFREQUENCY ABLATION, Genicular Cooled 1 of 2;  Surgeon: Lina Wagner MD;  Location: [facility] PAIN MGT;  Service: Pain Management;  Laterality: Right;    RADIOFREQUENCY ABLATION Left 11/03/2020    Procedure: RADIOFREQUENCY ABLATION, GENICULAR COOLED  2 OF 2;  Surgeon: Lina Wagner MD;  Location: [facility] PAIN MGT;  Service: Pain Management;  Laterality: Left;    RADIOFREQUENCY ABLATION Left 12/15/2020    Procedure: RADIOFREQUENCY ABLATION LEFT L2,3,4,5 1 of 2;  Surgeon: Lina Wagner MD;  Location: [facility] PAIN MGT;  Service: Pain Management;  Laterality: Left;    RADIOFREQUENCY ABLATION Right 12/29/2020    Procedure: RADIOFREQUENCY ABLATION RIGHT L2,3,4,5 2 of 2;  Surgeon: Lina Wagner MD;  Location: [facility] PAIN MGT;  Service: Pain Management;  Laterality: Right;    RADIOFREQUENCY ABLATION Left 06/29/2021    Procedure: RADIOFREQUENCY ABLATION GENICULAR COOLED;  Surgeon: Lina Wagner MD;  Location: [facility] PAIN MGT;  Service: Pain Management;  Laterality: Left;  1 of 2    RADIOFREQUENCY ABLATION Right 07/13/2021    Procedure: RADIOFREQUENCY ABLATION GENICULAR;  Surgeon: Lina Wagner MD;  Location: [facility] PAIN MGT;  Service: Pain Management;  Laterality: Right;  2 of 2    RADIOFREQUENCY ABLATION Right 08/24/2021    Procedure: RADIOFREQUENCY ABLATION,  L2-L3-L4-L5 MEDIAL BRANCH 1 OF 2;  Surgeon: Lina Wagner MD;  [facility];  Service: Pain Management;  Laterality: Right;    RADIOFREQUENCY ABLATION Left 09/11/2021    Procedure: RADIOFREQUENCY ABLATION, L2-L3-L4-L5 MEDIAL BR ANCH 2 OF 2;  Surgeon: Lina Wagner MD;  Location: [facility] PAIN MGT;  Service: Pain Management;  Laterality: Left;    RADIOFREQUENCY ABLATION Right 03/07/2022    Procedure: RADIOFREQUENCY ABLATION RIGHT L3,4,5 1 of 2, needs consent;  Surgeon: Israel Hurtado MD;  Location: [facility] [place] PAIN MGT;  Service: Pain Management;  Laterality: Right;    RADIOFREQUENCY ABLATION Left 03/21/2022    Procedure: RADIOFREQUENCY ABLATION RIGHT L3,4,5 2 of 2, needs consent;  Surgeon: Israel Hurtado MD;  Location: [facility] [place] PAIN MGT;  Service: Pain Management;  Laterality: Left;    RADIOFREQUENCY ABLATION Right 05/09/2022    Procedure: RADIOFREQUENCY ABLATION RIGHT GENICULAR COOLED 1 of 2;  Surgeon: Israel Hurtado MD;  Location: [facility] [place] PAIN MGT;  Service: Pain Management;  Laterality: Right;    RADIOFREQUENCY ABLATION Left 05/23/2022    Procedure: RADIOFREQUENCY ABLATION LEFT GENICULAR COOLED  2 of 2;  Surgeon: Israel Hurtado MD;  Location: [facility] [place] PAIN MGT;  Service: Pain Management;  Laterality: Left;    RADIOFREQUENCY ABLATION Right 12/12/2022    Procedure: RADIOFREQUENCY ABLATION RIGHT GENICULAR COOLED  ONE OF TWO  NEEDS CONSENT;  Surgeon: Israel Hurtado MD;  Location: [facility] [place] PAIN MGT;  Service: Pain Management;  Laterality: Right;    RADIOFREQUENCY ABLATION Left 01/09/2023    Procedure: RADIOFREQUENCY ABLATION LEFT GENICULAR COOLED  TWO OF TWO NEEDS CONSENT;  Surgeon: Israel Hurtado MD;  Location: [facility] [place] PAIN MGT;  Service: Pain Management;  Laterality: Left;    RADIOFREQUENCY ABLATION Right 03/06/2023    Procedure: RADIOFREQUENCY ABLATION RIGHT L3,L4,L5;  Surgeon: Israel Hurtado MD;  Location: [facility] PAIN MGT;  Service: Pain Management;  Laterality: Right;    RADIOFREQUENCY ABLATION Left 05/22/2023    Procedure:  RADIOFREQUENCY ABLATION LEFT L3,L4,L5;  Surgeon: Israel Hurtado MD;  Location: [facility] PAIN MGT;  Service: Pain Management;  Laterality: Left;  4/3 LM TO R/S    RADIOFREQUENCY ABLATION Right 11/27/2023    Procedure: RADIOFREQUENCY ABLATION RIGHT L3, 4, 5 1 OF 3;  Surgeon: Israel Hurtado MD;  Location: [facility] PAIN MGT;  Service: Pain Management;  Laterality: Right;    RADIOFREQUENCY ABLATION Right 01/22/2024    Procedure: RADIOFREQUENCY ABLATION RIGHT GENICULAR 3 NERVES 3 OF 3;  Surgeon: Israel Hurtado MD;  Location: [facility] PAIN MGT;  Service: Pain Management;  Laterality: Right;    RADIOFREQUENCY ABLATION Left 02/12/2024    Procedure: RADIOFREQUENCY ABLATION LEFT L3, 4, 5;  Surgeon: Israel Hurtado MD;  Location: [facility] PAIN MGT;  Service: Pain Management;  Laterality: Left;  [phone]    RADIOFREQUENCY ABLATION OF LUMBAR MEDIAL BRANCH NERVE AT SINGLE LEVEL Left 06/26/2018    Procedure: RADIOFREQUENCY ABLATION, NERVE, MEDIAL BRANCH, LUMBAR, 1 LEVEL;  Surgeon: Lina Wagner MD;  Location: [facility] PAIN MGT;  Service: Pain Management;  Laterality: Left;  Left Cooled RFA @ L2,3,4,5  [phone]  with Sedation    1 of 2    RADIOFREQUENCY ABLATION OF LUMBAR MEDIAL BRANCH NERVE AT SINGLE LEVEL Right 07/10/2018    Procedure: RADIOFREQUENCY ABLATION, NERVE, MEDIAL BRANCH, LUMBAR, 1 LEVEL;  Surgeon: Lina Wagner MD;  Location: [facility] PAIN MGT;  Service: Pain Management;  Laterality: Right;  RIght Cooled RFA @ L2,3,4,5  [phone] with Sedation    2 of 2    TRIGGER FINGER RELEASE Right 2017    TRIGGER FINGER RELEASE Left 07/29/2019    Procedure: RELEASE, TRIGGER FINGER, Left Thumb;  Surgeon: Velma Garcia MD;  Location: [facility] OR;  Service: Orthopedics;  Laterality: Left;  Local w/ MAC       Family History   Problem Relation Name Age of Onset    Diabetes Mother      Cataracts Mother      Diabetes Father      Cataracts Father      Hypertension Sister      Diabetes Sister      No Known Problems Sister      Cancer Sister           lymphoma     Coronary artery disease Brother      Diabetes Brother      Diabetes Brother      Hypotension Brother      Kidney failure Brother      Hypotension Brother      Amblyopia Neg Hx      Blindness Neg Hx      Glaucoma Neg Hx      Macular degeneration Neg Hx      Retinal detachment Neg Hx      Strabismus Neg Hx      Stroke Neg Hx      Thyroid disease Neg Hx      Anesthesia problems Neg Hx         Review of patient's allergies indicates:   Allergen Reactions    Strawberry Anaphylaxis         Current Outpatient Medications:     albuterol (VENTOLIN HFA) 90 mcg/actuation inhaler, INHALE TWO PUFFS INTO LUNGS EVERY 4 TO 6 HOURS AS NEEDED FOR SHORTNESS OF BREATH AND FOR WHEEZING, Disp: 18 g, Rfl: 1    allopurinoL (ZYLOPRIM) 300 MG tablet, Take 1 tablet (300 mg total) by mouth 2 (two) times daily., Disp: 180 tablet, Rfl: 3    apixaban (ELIQUIS) 5 mg Tab, Take 1 tablet (5 mg total) by mouth 2 (two) times daily., Disp: 60 tablet, Rfl: 6    atorvastatin (LIPITOR) 80 MG tablet, Take 1 tablet (80 mg total) by mouth once daily., Disp: 90 tablet, Rfl: 3    b complex vitamins tablet, Take 1 tablet by mouth every other day. , Disp: , Rfl:     calcium citrate/vitamin D2 (ISIAH-CITRATE ORAL), Take 500 mg by mouth 2 (two) times daily., Disp: , Rfl:     colchicine (MITIGARE) 0.6 mg Cap, One daily as needed for gout flare, Disp: 90 capsule, Rfl: 1    cyanocobalamin (VITAMIN B-12) 1000 MCG tablet, Take 100 mcg by mouth every morning., Disp: , Rfl:     diclofenac sodium (VOLTAREN) 1 % Gel, Apply 2 g topically 4 (four) times daily as needed. To painful area on the feet., Disp: 500 g, Rfl: 5    fluticasone propionate (FLONASE) 50 mcg/actuation nasal spray, 1 SPRAY BY EACH NOSTRIL ROUTE 2 TIMES DAILY AS NEEDED FOR RHINITIS., Disp: 48 g, Rfl: 3    LIDOcaine (XYLOCAINE) 5 % Oint ointment, APPLY TOPICALLY AS NEEDED., Disp: 35.44 g, Rfl: 6    metoprolol succinate (TOPROL-XL) 25 MG 24 hr tablet, Take 1 tablet (25 mg total) by mouth once  daily., Disp: 90 tablet, Rfl: 3    MULTIVIT-IRON-MIN-FOLIC ACID 3,500-18-0.4 UNIT-MG-MG ORAL CHEW, Take 1 tablet by mouth 2 (two) times daily. , Disp: , Rfl:     nystatin (MYCOSTATIN) powder, Apply topically 2 (two) times daily., Disp: 60 g, Rfl: 3    omeprazole (PRILOSEC) 20 MG capsule, TAKE 1 CAPSULE (20 MG TOTAL) BY MOUTH EVERY MORNING., Disp: 90 capsule, Rfl: 3    oxybutynin (DITROPAN-XL) 5 MG TR24, Take 1 tablet (5 mg total) by mouth once daily., Disp: 90 tablet, Rfl: 3    oxyCODONE-acetaminophen (PERCOCET)  mg per tablet, Take 1 tablet by mouth every 8 (eight) hours as needed for Pain., Disp: 90 tablet, Rfl: 0    prednisoLONE acetate (PRED FORTE) 1 % DrpS, Place 1 drop into the right eye 3 (three) times daily., Disp: , Rfl:     semaglutide (OZEMPIC) 1 mg/dose (4 mg/3 mL), Inject 1 mg into the skin every 7 days., Disp: 3 mL, Rfl: 11    tiZANidine (ZANAFLEX) 4 MG tablet, TAKE 1 TABLET (4 MG TOTAL) BY MOUTH EVERY 8 (EIGHT) HOURS AS NEEDED (MUSCLE PAIN)., Disp: 90 tablet, Rfl: 2    urea (CARMOL) 40 % Crea, Apply topically once daily. To dry skin on the feet., Disp: 120 g, Rfl: 5    vitamin D 185 MG Tab, Take 5,000 mg by mouth every morning. , Disp: , Rfl:     aspirin (ECOTRIN) 81 MG EC tablet, Take 81 mg by mouth once daily. Start 4 days prior to surgery and take until Eliquis restarted per Dr. Hathaway, Disp: , Rfl:     FLUoxetine 40 MG capsule, Take 1 capsule (40 mg total) by mouth once daily., Disp: 90 capsule, Rfl: 3    tretinoin microspheres (RETIN-A MICRO PUMP) 0.08 % GlwP, Apply to affected area daily, Disp: 50 g, Rfl: 0  No current facility-administered medications for this visit.    Facility-Administered Medications Ordered in Other Visits:     0.9%  NaCl infusion, , Intravenous, Continuous, Chaiban, Gassan M., MD, Last Rate: 25 mL/hr at 12/15/20 1506, 25 mL/hr at 12/15/20 1506    0.9%  NaCl infusion, , Intravenous, Continuous, Ciro Chung MD    0.9%  NaCl infusion, , Intravenous, Continuous,  "Santos Barron MD    Review of Systems:  Constitutional: no fever or chills  Eyes: no visual changes  ENT: no nasal congestion or sore throat  Respiratory: no cough or shortness of breath  Cardiovascular: no chest pain or palpitations  Gastrointestinal: no nausea or vomiting, tolerating diet  Genitourinary: no hematuria or dysuria  Integument/Breast: no rash or pruritis  Hematologic/Lymphatic: no easy bruising or lymphadenopathy  Musculoskeletal: positive for knee pain  Neurological: no seizures or tremors  Behavioral/Psych: no auditory or visual hallucinations  Endocrine: no heat or cold intolerance    PE:  Ht 5' 5" (1.651 m)   Wt 112.4 kg (247 lb 12.8 oz)   LMP 06/26/2018   BMI 41.24 kg/m²   General: Pleasant, cooperative, NAD   Gait: antalgic  HEENT: NCAT, sclera nonicteric   Lungs: Respirations clear bilaterally; equal and unlabored.   CV: S1S2; 2+ bilateral upper and lower extremity pulses.   Skin: Intact throughout with no rashes, erythema, or lesions  Extremities: No LE edema,  no erythema or warmth of the skin in either lower extremity.    Right knee exam:  Knee Range of Motion:normal, pain with passive range of motion  Effusion:none  Condition of skin:intact  Location of tenderness:Medial joint line   Strength:normal  Stability: stable to testing    Hip Examination: painless PROM of hip     Radiographs: Radiographs reveal prosthetic joint in position and alignment.     Knee Alignment: normal    Diagnosis: prosthetic joint failure, right knee    Plan: Right total knee revision arthroplasty    Due to the serious nature of total joint infection and the high prevalence of community acquired MRSA, vancomycin will be used perioperatively.            "

## 2024-07-01 NOTE — ASSESSMENT & PLAN NOTE
She was found to have AFib on a cardiac event monitor after she had a stroke  She is under electrophysiology care use  She is currently maintained on apixaban, metoprolol      Thrombo-embolic event within the last 3 months- none     - Restoration of normal sinus rhythm from atrial fibrillation or atrial flutter, either by cardioversion or ablation, within the last month- none     - Pulmonary vein isolation within the last 2 months- none     - Left atrial appendage thrombus within the last 3 months- none .    No contraindications to holding anticoagulation for surgery     Date of surgery - 7/18  Type of Anesthesia- Regional   Last day of  use pre op- 7/14 PM dose

## 2024-07-01 NOTE — ASSESSMENT & PLAN NOTE
Jan 2024 - 4 th     Symptoms of stroke-left arm weakness, tingling, left-sided facial droop    Imaging     CT January 4, 2024    No acute abnormality. No high-grade stenosis or major vessel occlusion. If the patient has an acute, focal neurological deficit, MRI of the brain may be indicated     MRI Jan 5 th 2024  No evidence of recent infarction or other acute intracranial pathology.     Echo Jan 5 th 2024      Left Ventricle: The left ventricle is normal in size. Ventricular mass is normal. Normal wall thickness. Normal wall motion. There is normal systolic function with a visually estimated ejection fraction of 60 - 65%. Ejection fraction by visual approximation is 63%. There is normal diastolic function.    Right Ventricle: Normal right ventricular cavity size. Wall thickness is normal. Right ventricle wall motion  is normal. Systolic function is normal.    Left Atrium: Left atrium is mildly dilated.    Right Atrium: Right atrium is mildly dilated.    Aortic Valve: The aortic valve is a trileaflet valve. There is aortic valve sclerosis.    Tricuspid Valve: There is mild regurgitation.    Pulmonary Artery: The estimated pulmonary artery systolic pressure is 32 mmHg.    IVC/SVC: Normal venous pressure at 3 mmHg.    Jan 2024     Cardiac event monitoring    Baseline rhythm was normal sinus rhythm with normal intervals and an initial heart rate of 73 bpm.    There were 61 patient-triggered episodes with reported symptoms of heart racing and shortness of breath. These episodes corresponded to periods of normal sinus rhythm and sinus tachycardia with occasional PVCs and frequent PACs.    There were occasional PVCs and frequent PACs.    There was an event of paroxysmal atrial fibrillation captured on 1/27/2024, at times with rapid ventricular response.       Since the stroke, she feels that her short-term memory is short but does not have any motor symptoms    As per chart     Plan to  add asa 81mg/day 4 days prior until  Eliquis is restarted.  TNK aborted R hemispheric ischemic stroke (MR-negative) 1/4/24.  Etiology likely PAF   She has a past medical history significant for a prior right MCA stroke on 1/4/2024, with a new diagnosis of paroxysmal atrial fibrillation captured on an ambulatory event monitor    Risk factors     Hypertension   Hyperlipidemia   Type 2 diabetes  Never smoked tobacco

## 2024-07-01 NOTE — ASSESSMENT & PLAN NOTE
Hypertension-  Blood pressure is acceptable . I suggest continuation of -metoprolol------ during the entire perioperative period. I suggest   blood pressure, electrolyte and renal function monitoring  .I suggest addressing pain control as uncontrolled pain can increased blood pressure

## 2024-07-01 NOTE — PROGRESS NOTES
[facility]  Progress Note    Patient Name: Alivia Thomas  MRN: [id number]  Date of Evaluation- 07/02/2024  PCP- Clarisse Richardson MD    Future cases for Alivia Thomas [[id number]]       Case ID Status Date Time Behzad Procedure Provider Location    [id number] [facility] 7/18/2024  9:55  REVISION, ARTHROPLASTY, KNEE: RIGHT Theodore Swan MD [[id number]] [facility] OR 2ND FLR            HPI:  History of present illness- I had the pleasure of meeting this pleasant 58 y.o. lady in the pre op clinic prior to her elective Orthopedic surgery. The patient is new to me .   Offered to have family to be on the phone during the consultation      I have obtained the history by speaking to the patient and by reviewing the electronic health records.    Events leading up to surgery / History of presenting illness -    She had a right-sided total knee replacement surgery done around 2009/2010 and had required revision surgeries  She had no previous right knee infection    She has been troubled with severe  Rt knee  pain for long time  .   Pain increases with activity and decreases with pain medication.      Relevant health conditions of significance for the perioperative period/ History of presenting illness -    Health conditions of significance for the perioperative period      Opioid use  Asthma  BMI 41.24   DM2  GERD  HLD  PADDY   AFIB - apixaban   MCA Stroke 1/2024 (no residual deficits)   Gout,   Ozempic Use, Monday    Gastric Sleeve  History of COVID 2020    Not known to have Thyroid problem, Kidney problem, deep vein thrombosis, pulmonary embolism, fatty liver , vascular stenting , tobacco smoking    Lives with wife  Single story house   Pets 1 dog  Children - none  Pregnancies - none  Has help post op                       Subjective/ Objective:     Chief complaint-Preoperative evaluation, Perioperative Medical management, complication reduction plan     Active cardiac conditions- none    Revised cardiac risk  index predictors- history of cerebrovascular disease    Functional capacity -Examples of physical activity  can take 1 flight of stairs----- She can undertake all the above activities without  chest pain,chest tightness, Shortness of breath ,dizziness,lightheadedness making her exercise tolerance more,  than 4 Mets.   Winded on exertion, not new ,stable over time- attributes to niece and possibly weight-breathing better since lost weight     Review of Systems   Constitutional:  Negative for chills and fever.   HENT:          Sleep anea   Eyes:         No new visual changes   Respiratory:          No cough , phlegm    No Hemoptysis   Cardiovascular:         As noted   Gastrointestinal:         No overt GI/ blood losses  Bowel movements- Regular   Endocrine:        Prednisone use > 20 mg daily for 3 weeks- None   Genitourinary:  Negative for dysuria.        No urinary hesitancy    Musculoskeletal:         As above      Skin:  Negative for rash.   Neurological:  Negative for syncope.        No unilateral weakness   Hematological:         Current use of Anticoagulants  Yes               No PONV bleeding, cardiac problems with previous surgeries/procedures.  Medications and Allergies reviewed in epic.   She had shortness a breath after her panniculectomy surgery that she attributes to the anesthesia and she has discussed this with the anesthesiology care provider  FH- No anesthesia,bleeding / venous thrombosis , disease in family      Physical Exam          Physical Exam  Constitutional-  General appearance-Conscious,Coherent  Eyes- No conjunctival icterus,pupils  round  and reactive to light   ENT-Oral cavity- moist    , Hearing grossly normal   Neck- No thyromegaly ,Trachea -central, No jugular venous distension,   No Carotid Bruit   Cardiovascular -Heart Sounds- Normal ,  no murmur , and  occasional irregularity suggestive of ectopy   , No gallop rhythm   Respiratory - Normal Respiratory Effort, Normal breath  sounds,  no wheeze , and  no forced expiratory wheeze    Peripheral pitting pedal edema-- none , no calf pain   Gastrointestinal -Soft abdomen, No palpable masses, Non Tender,Liver,Spleen not palpable. No-- free fluid and shifting dullness  Musculoskeletal- No finger Clubbing. Strength grossly normal   Lymphatic-No Palpable cervical, axillary,Inguinal lymphadenopathy   Psychiatric - normal effect,Orientation  Rt Dorsalis pedis pulses-palpable    Lt Dorsalis pedis pulses- palpable   Rt Posterior tibial pulses -palpable   Left posterior tibial pulses -palpable   Miscellaneous -  no renal bruit   Investigations  Lab and Imaging have been reviewed in epic.    Review of Medicine tests    EKG-  from--3/26/2024  It was reported to be showing -Sinus rhythm with premature atrial complexes   Right atrial enlargement   Borderline Abnormal ECG   When compared with ECG of 04-JAN-2024 20:28,   premature atrial complexes are present     2019    The relative PET images are normal showing no clinically significant regional resting or stress induced perfusion defects.  Gated perfusion images showed an ejection fraction of 77 % at rest and 80 % during stress. Normal is >51%.  Wall motion was normal at rest and stress.  LV cavity size is normal at rest and normal at stress.  The EKG portion of this study is negative for myocardial ischemia.  Arrhythmias during stress: rare PACs.  The patient reported headache during the stress test.    Review of clinical lab tests:  Lab Results   Component Value Date    CREATININE 0.7 03/18/2024    HGB 12.5 03/18/2024     03/18/2024             Review of old records- Was done and information gathered regards to events leading to surgery and health conditions of significance in the perioperative period.        Preoperative cardiac risk assessment-  The patient does not have any active cardiac conditions . Revised cardiac risk index predictors- -1--.Functional capacity is more than 4 Mets. She  will be undergoing a Orthopedic procedure that carries a Moderate Risk risk     Risk of a major Cardiac event ( Defined as death, myocardial infarction, or cardiac arrest at 30 days after noncardiac surgery), based on RCRI score     -6.0%     No further cardiac work up is indicated prior to proceeding with the surgery     Orders Placed This Encounter    mupirocin (BACTROBAN) 2 % ointment       American Society of Anesthesiologists Physical status classification ( ASA ) class: 3     Postoperative pulmonary complication risk assessment:      ARISCAT ( Canet) risk index- risk class -  Low, if duration of surgery is under 3 hours, intermediate, if duration of surgery is over 3 hours          Assessment/Plan:     Cervical radiculopathy  She is not troubled with the neck  No neck pain or numbness down the arms  MRI 2018- Mild multilevel cervical spondylosis with mild spinal canal stenosis and mild right neuroforaminal narrowing at C4-5 and C5-6.       Chronic pain  Both knees  She is on Percocet 3 times a day through pain management    Chronic continuous opioid use- In view of the opioid use, the patient may have opioid tolerance . I suggest considering the possibility of opioid tolerance  in planning post operative pain control     Discussed possibly reducing the opioid use in preparation for surgery , so that it reduces the opioid tolerance which helps post op pain control     Suggested letting pain management/ Prescribing provider  know about the up coming surgery      Suggest avoidance of abrupt stopping of opioid medication as it can cause opioid withdrawal     She is unsure if she can reduce the pain medication for surgery    She does not seem to be constipated from pain medication    Diabetic polyneuropathy associated with type 2 diabetes mellitus  She has type 2 diabetes and is currently taking Ozempic    Capillary glucose check-none    Diabetes Complications     Microvascular     Not known to have   Diabetes  affecting the eyes, Kidneys   No tingling numbness of hands and feet  No reported open areas on the feet   Feet care suggested     Macrovascular     Had  stroke jan 2024  Not known to have CAD    She takes Ozempic on a Monday and I suggested to skip the Ozempic dose on 15th  Suggested skipping one dose prior to surgery and hold that while on opioid pain medication , because they can slow gut emptying        Diabetes Mellitus-I suggest monitoring the glucose in the perioperative period ( Before meals and bed time,if the patient is on oral feeds or every 6 hourly ,if the patient is NPO )  Blood glucose target in hospitalized patients is 140-180. Oral Hypoglycemic agents are generally avoided during the hospital stay . If glucose is consistently elevated ,I suggest using basal ,prandial Insulin regimen to control the glucose , as elevated glucose can be associated with adverse surgical out comes. Please consider involving Hospital Medicine or Endocrinology ,if any help is needed with Glucose control. Patient will be instructed based on the pre op clinic guidelines  about adjustment of diabetic treatment (If applicable )  considering the NPO status for Surgery      I had educated that uncontrolled DM can cause post op complications,risk of infection, wound healing problem,increased length of stay in hospital and its associated complications.I suggest exercise as much as possible and follow diabetic diet             History of right MCA stroke  Jan 2024 - 4 th     Symptoms of stroke-left arm weakness, tingling, left-sided facial droop    Imaging     CT January 4, 2024    No acute abnormality. No high-grade stenosis or major vessel occlusion. If the patient has an acute, focal neurological deficit, MRI of the brain may be indicated     MRI Jan 5 th 2024  No evidence of recent infarction or other acute intracranial pathology.     Echo Jan 5 th 2024      Left Ventricle: The left ventricle is normal in size. Ventricular mass  is normal. Normal wall thickness. Normal wall motion. There is normal systolic function with a visually estimated ejection fraction of 60 - 65%. Ejection fraction by visual approximation is 63%. There is normal diastolic function.    Right Ventricle: Normal right ventricular cavity size. Wall thickness is normal. Right ventricle wall motion  is normal. Systolic function is normal.    Left Atrium: Left atrium is mildly dilated.    Right Atrium: Right atrium is mildly dilated.    Aortic Valve: The aortic valve is a trileaflet valve. There is aortic valve sclerosis.    Tricuspid Valve: There is mild regurgitation.    Pulmonary Artery: The estimated pulmonary artery systolic pressure is 32 mmHg.    IVC/SVC: Normal venous pressure at 3 mmHg.    Jan 2024     Cardiac event monitoring    Baseline rhythm was normal sinus rhythm with normal intervals and an initial heart rate of 73 bpm.    There were 61 patient-triggered episodes with reported symptoms of heart racing and shortness of breath. These episodes corresponded to periods of normal sinus rhythm and sinus tachycardia with occasional PVCs and frequent PACs.    There were occasional PVCs and frequent PACs.    There was an event of paroxysmal atrial fibrillation captured on 1/27/2024, at times with rapid ventricular response.       Since the stroke, she feels that her short-term memory is short but does not have any motor symptoms    As per chart     Plan to  add asa 81mg/day 4 days prior until Eliquis is restarted.  TNK aborted R hemispheric ischemic stroke (MR-negative) 1/4/24.  Etiology likely PAF   She has a past medical history significant for a prior right MCA stroke on 1/4/2024, with a new diagnosis of paroxysmal atrial fibrillation captured on an ambulatory event monitor    Risk factors     Hypertension   Hyperlipidemia   Type 2 diabetes  Never smoked tobacco            Chronic, continuous use of opioids  Percocet    Anxiety  She took care of her father and  brother prior to their passing    She is taking fluoxetine that is helping    Doing fairly good from a mental health stand point   Not under psychiatry , Therapist care   Has supportive family   On Medication that is helping   No Suicidal / Homicidal ideation        Mild intermittent asthma without complication  She has not needing albuterol as much, suggesting that her asthma control is doing good  She has not on scheduled medication for asthma    Noncompliance with CPAP treatment  Not using  CPAP  Suggested bringing for hospital use .   Informed the risk of worsening sleep apnea in the perioperative period and suggest using CPAP use any time in 24 hrs ( day or night )for planned sleep     I suggest  caution with usage of medication that can cause respiratory suppression in the perioperative period    Avoidance of  supine sleep, weight gain , alcoholic beverages , care with , sedative , CNS depressant use indicated  since all of these can worsen PADDY        I suggest  caution with usage of medication that can cause respiratory suppression in the perioperative period  potential ramifications of untreated sleep apnea, which could include daytime sleepiness, hypertension, heart disease and stroke were discussed    Discussed the relation between sleep apnea and atrial fibrillation       Hyperlipemia  On atorvastatin    HLD-I  suggest continuation of statin during the entire perioperative period.     Paroxysmal atrial fibrillation  She was found to have AFib on a cardiac event monitor after she had a stroke  She is under electrophysiology care use  She is currently maintained on apixaban, metoprolol      Thrombo-embolic event within the last 3 months- none     - Restoration of normal sinus rhythm from atrial fibrillation or atrial flutter, either by cardioversion or ablation, within the last month- none     - Pulmonary vein isolation within the last 2 months- none     - Left atrial appendage thrombus within the last 3  months- none .    No contraindications to holding anticoagulation for surgery     Date of surgery - 7/18  Type of Anesthesia- Regional   Last day of  use pre op- 7/14 PM dose    Primary hypertension    Hypertension-  Blood pressure is acceptable . I suggest continuation of -metoprolol------ during the entire perioperative period. I suggest   blood pressure, electrolyte and renal function monitoring  .I suggest addressing pain control as uncontrolled pain can increased blood pressure      Uterine fibroid  She does not have cycles anymore  She stopped having her cycles at about 49 years of age  She has sexually not active with a male partner and believes that she has not pregnant    BMI 40.0-44.9, adult  Ozempic seems to be helping with weight loss  She wants to lose more weight which I have encouraged that she might be able to lose weight with increased mobility after joint revision surgery  She would gastric sleeve done in the past 2019     Weight related conditions     Known to have     HTN  Type 2 Diabetes   Gout   Hyperlipidemia   Sleep apnea   Acid reflux   Osteoarthritis    Not troubled with / Not known to have     Fatty liver     Encouraged maintaining healthy weight for improved health     History of sleeve gastrectomy  She is doing good from a gastric sleeve standpoint    GERD (gastroesophageal reflux disease)    Does not sound Cardiac   She is doing good from a reflux standpoint    GERD-  I suggest continuation of the Proton pump inhibitor in the perioperative period . I suggest aspiration precautions     Gout   Gout, allopurinol colchicine  She likes eating meat on a regular basis  She is on colchicine on a regular basis and I suggested not to take colchicine on the morning of surgery    Gout -  I suggest continuation of the maintenance treatment for gout and to keep the patient adequately hydrated.  Please note that surgical stress ,dehydration can precipitate acute gout        Lumbago  Low back pain,  down to the both knees      Allergic reaction to food  Strawberries    Allergies  Doing good     History of COVID-19  Did not require hospitalization, intubation or supplemental oxygen use   Had been vaccinated   Recovered from COVID    COVID screening     No fever   No cough   No SOB  No sore throat   No loss of taste or smell   No muscle aches   No nausea, vomiting , diarrhea     History of staph infection  Gluteal area  2005/2006    Based on chart review / Patient has a reported history of Staph infection   In an attempt to prevent Surgical site infection , with the up coming surgery , suggest the following      Intra nasal Bactroban for 5 days ( start 2 days pre op   )   Instructions for use of  nasal Bactroban    Generic ointment or generic cream (30gram tube) - place a drop on a q tip , insert into to very tip of the nose only , then press on the nose gently to distribute.     Or Single use tube-1 gram dose - half tube in each nostril bid for 5 days .           Preventive perioperative care    Thromboembolic prophylaxis:  Her risk factors for thrombosis include surgical procedure and age.I suggest  thromboembolic prophylaxis ( mechanical/pharmacological, weighing the risk benefits of pharmacological agent use considering [facility] procedural bleeding )  during the perioperative period.I suggested being active in the post operative period.      Postoperative pulmonary complication prophylaxis-Risk factors for post operative pulmonary complications include ASA class >2- I suggest incentive spirometry use, early ambulation, and end tidal carbon dioxide monitoring  , oral care , head end of bed elevation      Renal complication prophylaxis-Risk factors for renal complications include diabetes mellitus and hypertension . I suggest keeping her well hydrated  in the perioperative period.     Surgical site Infection Prophylaxis-I  suggest appropriate antibiotic for Prophylaxis against Surgical site infections    Skin  antibacterial discussed       Delirium prophylaxis-Risk factors - opioid use - I suggest avoidance / minimizing the use of  Benzodiazepines ( unless the patient has been taking it on a regular basis ),Anticholinergic medication,Antihistamines ( like  Benadryl).I suggest minimizing the use of opioid medication and use of IV tylenol,if it is appropriate. I suggest using the lowest possible dose of opioids for the shortest duration possible in the perioperative period. I suggest to Keep shades/blinds open during the day, lights off and shades closed at night to encourage normal sleep/wake cycle.I encourage the presence of the family member with the patient at all times, if at all possible as mental status changes can be picked up early by the family members and they help with reorientation. I encouraged the presence of family to help with orientation in the perioperative period. Benadryl avoidance suggested      In view of regional anesthesia  the patient  is at risk of postoperative urinary retention.  I suggest avoidance / minimizing the of  Benzodiazepines,Anticholinergic medication,antihistamines ( Benadryl) , if possible in the perioperative period. I suggest using the minimum possible use of opioids for the minimum period of time in the perioperative period. Benadryl avoidance suggested      This visit was focused on Preoperative evaluation, Perioperative Medical management, complication reduction plans. I suggest that the patient follows up with primary care or relevant sub specialists for ongoing health care.    I appreciate the opportunity to be involved in this patients care. Please feel free to contact me if there were any questions about this consultation.    Patient is optimized    Patient/ care giver/ Family member was instructed to call and update me about any changes to health,  medication, office visits ,testing out side of the [facility] , hospitalizations between now and surgery       Cindy To MD  Internal Medicine  [facility]   Cell Phone- [phone]    She has bilateral knee problems but other than that has no leg weakness or urinary incontinence or balance problems  She has no problems with small objects    Checked for over-the-counter medication    --  7/2/2024- 1629    Lab testing from yesterday showed a hemoglobin A1c of 6.9  BUN mildly elevated   Alk-phos an albumin mildly low  Hemoglobin, platelet count are normal   Hematocrit mildly low    Called to discuss lab  Spoke to her   Watch A1c  Hydration  With regards to mildly low albumin-no infection or inflammation, liver problem or thyroid problems, kidney problem, protein loss in the gut, heart failure    Not known to have underactive thyroid low B12, Low Zinc, Low Magnesium

## 2024-07-01 NOTE — PROGRESS NOTES
[facility]    HOME HEALTH ORDERS  FACE TO FACE ENCOUNTER    Patient Name: Alivia Thomas  YOB: 1965    PCP: Clarisse Richardson MD   PCP Address: [address] / [place] [address]  PCP Phone Number: [phone]  PCP Fax: [phone]    Encounter Date: 07/01/2024    Admit to Home Health    Diagnoses:  There are no hospital problems to display for this patient.      Future Appointments   Date Time Provider Department Center   7/1/2024  3:00 PM Cindy To MD [facility] PREOPC [address]   7/1/2024  4:50 PM LAB, APPOINTMENT [facility]   7/15/2024  9:00 AM Stacey Rowland DPM [facility] FAITH [address]   7/22/2024 11:30 AM TELEMED NURSE, [facility] ORTHO [facility] ORTHO [facility]   8/2/2024 10:00 AM Seema Weinberg NP [facility] ORTHO [facility]   8/15/2024  8:45 AM Leeanne Silva MD [facility] OBGYN [facility]   8/16/2024  8:20 AM Anisa Cole NP [facility]   8/28/2024 10:30 AM Theodore Swan MD [facility] ORTHO [facility]   10/9/2024  2:15 PM Theodore Swan MD [facility] ORTHO [facility]           I have seen and examined this patient face to face today. My clinical findings that support the need for the home health skilled services and home bound status are the following:  Weakness/numbness causing balance and gait disturbance due to Joint Replacement making it taxing to leave home.  Requiring assistive device to leave home due to unsteady gait caused by Joint Replacement.  Medical restrictions requiring assistance of another human to leave home due to  Unstable ambulation and Decreased range of motions in extremities.    Allergies:  Review of patient's allergies indicates:   Allergen Reactions    Strawberry Anaphylaxis       Diet: diabetic diet: 2000 calorie    Activities: activity as tolerated    Home Health Admitting Clinician:   SN/PT to complete comprehensive assessment including routine vital signs. Instruct on disease  process and s/s of complications to report to MD. Follow specific home health arthoplasty protocol. Review/verify medication list sent home with the patient at time of discharge  and instruct patient/caregiver as needed. If coumadin ordered, coumadin clinic to manage INR with INR draws 2x per week with a goal to maintain INR between 1.8 and 2.2. Frequency may be adjusted depending on start of care date.    Notify MD if SBP > 160 or < 90; DBP > 90 or < 50; HR > 120 or < 50; Temp > 101    Home Medical Equipment:  Walker, 3-1 bedside commode, transfer tub bench    CONSULTS:    Physical Therapy may admit if patient not on coumadin, PT to perform comprehensive assessment if performing admit visit and generate therapy plan of care. Evaluate for home safety and equipment needs; Establish/upgrade home exercise program. Perform/instruct on therapeutic exercises, gait training, transfer training, and Range of Motion.      OTHER: (only select if patient needs other therapy disciplines)  Occupational Therapy to evaluate and treat. Evaluate home environment for safety and equipment needs. Perform/Instruct on transfers, ADL training, ROM, and therapeutic exercises.    MISCELLANEOUS CARE:  Dr. Swan approves of home health in the post operative period.    WOUND CARE ORDERS:  Assess Surgical Incision/DSRG each TX  Aquacel AG drsg applied post-op leave on 14 days post op. Call MD if any drainage reaches border to border of drsg horizontally, s/s of infection, temp >101, induration, swelling or redness.  If dressing is removed per MD order, then apply island dressing, change/teach caregiver to perform daily dressing change if island dressing present.    Medications: Review discharge medications with patient and family and provide education.      Cannot display discharge medications since this is not an admission.      I certify that this patient is confined to her home and needs physical therapy and occupational therapy.

## 2024-07-01 NOTE — ASSESSMENT & PLAN NOTE
She does not have cycles anymore  She stopped having her cycles at about 49 years of age  She has sexually not active with a male partner and believes that she has not pregnant

## 2024-07-01 NOTE — ASSESSMENT & PLAN NOTE
She took care of her father and brother prior to their passing    She is taking fluoxetine that is helping    Doing fairly good from a mental health stand point   Not under psychiatry , Therapist care   Has supportive family   On Medication that is helping   No Suicidal / Homicidal ideation

## 2024-07-02 DIAGNOSIS — M79.609 PAIN IN EXTREMITY, UNSPECIFIED EXTREMITY: Primary | ICD-10-CM

## 2024-07-03 ENCOUNTER — PATIENT MESSAGE (OUTPATIENT)
Dept: BARIATRICS | Facility: CLINIC | Age: 59
End: 2024-07-03
Payer: MEDICARE

## 2024-07-03 DIAGNOSIS — Z71.89 COMPLEX CARE COORDINATION: ICD-10-CM

## 2024-07-08 ENCOUNTER — PATIENT MESSAGE (OUTPATIENT)
Dept: ORTHOPEDICS | Facility: CLINIC | Age: 59
End: 2024-07-08
Payer: COMMERCIAL

## 2024-07-09 ENCOUNTER — PATIENT MESSAGE (OUTPATIENT)
Dept: BARIATRICS | Facility: CLINIC | Age: 59
End: 2024-07-09
Payer: COMMERCIAL

## 2024-07-12 ENCOUNTER — TELEPHONE (OUTPATIENT)
Dept: ORTHOPEDICS | Facility: CLINIC | Age: 59
End: 2024-07-12
Payer: MEDICARE

## 2024-07-12 NOTE — TELEPHONE ENCOUNTER
Called pt and discussed that I have put in the order for the shower chair, commode and walker - things that patients typically need after surgery. Stated that she should call [facility] [phone] and discuss getting all items delivered and also tell them that a 3rd party ([employer]) is paying what insurance does not cover. Stated that is they ask to tell them to check her media files and find a letter that stated payment authorization [employer]. Pt verbalized understanding and has no further questions.       ----- Message from Yuly Abbasi sent at 7/11/2024  2:54 PM CDT -----  Regarding: FW: PT'S CALLING REGARDING MEDICAL EQUIPTMENT THAT MAY BE NEEDED ONCE SHE GETS HOME FROM SURGERY  Contact: PT    ----- Message -----  From: Veronique Santos  Sent: 7/11/2024   2:13 PM CDT  To: Sosa VERA Staff  Subject: PT'S CALLING REGARDING MEDICAL EQUIPTMENT TH#    Confirmed contact info below:  Contact Name: Alivia Marie Cyr  Phone Number: [phone]

## 2024-07-12 NOTE — TELEPHONE ENCOUNTER
Called pt and discussed that she called DME and they said that they did not see her orders in. I stated I would send a message to our DME rep to have her check. She also stated that she asked about the polar care and [facility] and pt was told the DME department doesn't do that. Stated she should call [facility] and inquire. Pt verbalized understanding and has no further questions. She will call back with what she finds out on 7/15/24.       ----- Message from Yuly Abbasi sent at 7/12/2024 12:02 PM CDT -----  Regarding: FW: Surgery  Contact: pt @ [phone]    ----- Message -----  From: Radha Stein  Sent: 7/12/2024  11:10 AM CDT  To: Sosa VERA Staff  Subject: Surgery                                          Message is for Yuly, pt have additional questions about her upcoming surgery. Asking for a call back

## 2024-07-15 ENCOUNTER — TELEPHONE (OUTPATIENT)
Dept: ORTHOPEDICS | Facility: CLINIC | Age: 59
End: 2024-07-15
Payer: COMMERCIAL

## 2024-07-15 ENCOUNTER — OFFICE VISIT (OUTPATIENT)
Dept: PODIATRY | Facility: CLINIC | Age: 59
End: 2024-07-15
Payer: MEDICARE

## 2024-07-15 VITALS
WEIGHT: 247.81 LBS | SYSTOLIC BLOOD PRESSURE: 124 MMHG | HEIGHT: 65 IN | BODY MASS INDEX: 41.29 KG/M2 | DIASTOLIC BLOOD PRESSURE: 81 MMHG | HEART RATE: 76 BPM

## 2024-07-15 DIAGNOSIS — L84 CORNS AND CALLUS: ICD-10-CM

## 2024-07-15 DIAGNOSIS — E11.49 TYPE II DIABETES MELLITUS WITH NEUROLOGICAL MANIFESTATIONS: Primary | ICD-10-CM

## 2024-07-15 DIAGNOSIS — Z96.659 FAILURE OF TOTAL KNEE REPLACEMENT, SEQUELA: Primary | ICD-10-CM

## 2024-07-15 DIAGNOSIS — B35.1 DERMATOPHYTOSIS OF NAIL: ICD-10-CM

## 2024-07-15 DIAGNOSIS — Z96.651 S/P REVISION OF TOTAL KNEE, RIGHT: Primary | ICD-10-CM

## 2024-07-15 DIAGNOSIS — T84.018S FAILURE OF TOTAL KNEE REPLACEMENT, SEQUELA: Primary | ICD-10-CM

## 2024-07-15 PROCEDURE — 99214 OFFICE O/P EST MOD 30 MIN: CPT | Mod: PBBFAC,PN | Performed by: PODIATRIST

## 2024-07-15 PROCEDURE — 99499 UNLISTED E&M SERVICE: CPT | Mod: S$PBB,,, | Performed by: PODIATRIST

## 2024-07-15 PROCEDURE — 11056 PARNG/CUTG B9 HYPRKR LES 2-4: CPT | Performed by: PODIATRIST

## 2024-07-15 PROCEDURE — 11721 DEBRIDE NAIL 6 OR MORE: CPT | Mod: 59,Q9,S$PBB, | Performed by: PODIATRIST

## 2024-07-15 PROCEDURE — 99999 PR PBB SHADOW E&M-EST. PATIENT-LVL IV: CPT | Mod: PBBFAC,,, | Performed by: PODIATRIST

## 2024-07-15 RX ORDER — CELECOXIB 200 MG/1
200 CAPSULE ORAL DAILY
Qty: 30 CAPSULE | Refills: 0 | Status: SHIPPED | OUTPATIENT
Start: 2024-07-15

## 2024-07-15 RX ORDER — AMOXICILLIN 250 MG
1 CAPSULE ORAL DAILY
Qty: 30 TABLET | Refills: 0 | Status: SHIPPED | OUTPATIENT
Start: 2024-07-15

## 2024-07-15 RX ORDER — CEFADROXIL 500 MG/1
500 CAPSULE ORAL EVERY 12 HOURS
Qty: 14 CAPSULE | Refills: 0 | Status: SHIPPED | OUTPATIENT
Start: 2024-07-15 | End: 2024-07-28

## 2024-07-15 RX ORDER — PANTOPRAZOLE SODIUM 40 MG/1
40 TABLET, DELAYED RELEASE ORAL DAILY
Qty: 30 TABLET | Refills: 0 | Status: SHIPPED | OUTPATIENT
Start: 2024-07-15

## 2024-07-15 RX ORDER — DEXTROMETHORPHAN HYDROBROMIDE, GUAIFENESIN 5; 100 MG/5ML; MG/5ML
650 LIQUID ORAL EVERY 8 HOURS
Qty: 120 TABLET | Refills: 0 | Status: SHIPPED | OUTPATIENT
Start: 2024-07-15

## 2024-07-15 RX ORDER — OXYCODONE HYDROCHLORIDE 5 MG/1
TABLET ORAL
Qty: 30 TABLET | Refills: 0 | Status: ON HOLD | OUTPATIENT
Start: 2024-07-15 | End: 2024-07-21 | Stop reason: HOSPADM

## 2024-07-15 RX ORDER — METHOCARBAMOL 750 MG/1
750 TABLET, FILM COATED ORAL 4 TIMES DAILY PRN
Qty: 40 TABLET | Refills: 0 | Status: SHIPPED | OUTPATIENT
Start: 2024-07-15

## 2024-07-15 NOTE — PATIENT INSTRUCTIONS
How to Check Your Feet    Below are tips to help you look for foot problems. Try to check your feet at the same time each day, such as when you get out of bed in the morning.    Check the top of each foot. The tops of toes, back of the heel, and outer edge of the foot can get a lot of rubbing from poor-fitting shoes.    Check the bottom of each foot. Daily wear and tear often leads to problems at pressure spots.    Check the toes and nails. Fungal infections often occur between toes. Toenail problems can also be a sign of fungal infections or lead to breaks in the skin.    Check your shoes, too. Loose objects inside a shoe can injure the foot. Use your hand to feel inside your shoes for things like alie, loose stitching, or rough areas that could irritate your skin.        Diabetic Foot Care    Diabetes can lead to a number of different foot complications. Fortunately, most of these complications can be prevented with a little extra foot care. If diabetes is not well controlled, the high blood sugar can cause damage to blood vessels and result in poor circulation to the foot. When the skin does not get enough blood flow, it becomes prone to pressure sores and ulcers, which heal slowly.  High blood sugar can also damage nerves, interfering with the ability to feel pain and pressure. When you cant feel your foot normally, it is easy to injure your skin, bones and joints without knowing it. For these reasons diabetes increases the risk of fungal infections, bunions and ulcers. Deep ulcers can lead to bone infection. Gangrene is the most serious foot complication of diabetes. It usually occurs on the tips of the toes as blacked areas of skin. The black area is dead tissue. In severe cases, gangrene spreads to involve the entire toe, other toes and the entire foot. Foot or toe amputation may be required. Good foot care and blood sugar control can prevent this.    Home Care  Wear comfortable, proper fitting  shoes.  Wash your feet daily with warm water and mild soap.  After drying, apply a moisturizing cream or lotion.  Check your feet daily for skin breaks, blisters, swelling, or redness. Look between your toes also.  Wear cotton socks and change them every day.  Trim toe nails carefully and do not cut your cuticles.  Strive to keep your blood sugar under control with a combination of medicines, diet and activity.  If you smoke and have diabetes, it is very important that you stop. Smoking reduces blood flow to your foot.  Avoid activities that increase your risk of foot injury:  Do not walk barefoot.  Do not use heating pads or hot water bottles on your feet.  Do not put your foot in a hot tub without first checking the temperature with your hand.  10) Schedule yearly foot exams.    Follow Up  with your doctor or as advised by our staff. Report any cut, puncture, scrape, other injury, blister, ingrown toenail or ulcer on your foot.    Get Prompt Medical Attention  if any of the following occur:  -- Open ulcer with pus draining from the wound  -- Increasing foot or leg pain  -- New areas of redness or swelling or tender areas of the foot    © 1545-8449 [employer]. [address] [place] [address], [place] [address]. All rights reserved. This information is not intended as a substitute for professional medical care. Always follow your healthcare professional's instructions.

## 2024-07-15 NOTE — PROGRESS NOTES
Chief Complaint   Patient presents with    Diabetic Foot Exam     Foot Exam/PCP Clarisse Richardson MD  03/19/24           HPI:   The patient is a 58 y.o.  female  who presents for a diabetic foot exam/care   Patient reports + presence of abnormal sensation to the feet, chronic condition.   Patient has no history of wounds on the feet.   Hx of foot surgery: none.     She has history of gout, relieved with indomethacin.  This patient has documented high risk feet requiring routine maintenance secondary to diabetes.        Primary care doctor is: Clarisse Richardson MD  Patient last saw primary care doctor on:  Diabetic Foot Exam (Foot Exam/PCP Clarisse Richardson MD  03/19/24)        Patient Active Problem List   Diagnosis    Primary hypertension    Hyperlipemia    Proteinuria    Bilateral knee pain    DJD (degenerative joint disease) of knee    Debility    Prosthetic joint implant failure    Failed total knee arthroplasty    Right knee pain    History of total left knee replacement    Lumbago    Chronic, continuous use of opioids    Mild intermittent asthma without complication    Allergic reaction to food    DDD (degenerative disc disease), cervical    Cervical radiculopathy    Cervical spondylosis    Bilateral shoulder bursitis    Cervical stenosis of spinal canal    DDD (degenerative disc disease), thoracic    Thoracic spondylosis    Spondylolisthesis at L4-L5 level    Lumbar spondylosis    Spondylolisthesis of cervical region    Cervical spine instability    Myeloradiculopathy    Neural foraminal stenosis of cervical spine    DDD (degenerative disc disease), lumbar    Idiopathic scoliosis of thoracolumbar spine    Bilateral shoulder region arthritis    Trochanteric bursitis of both hips    Chronic pain    Anxiety    GERD (gastroesophageal reflux disease)    Sacroiliac joint pain    Spondylosis without myelopathy    Subacromial bursitis of left shoulder joint    Sacroiliitis    Snoring    BMI 40.0-44.9,  adult    Gout    History of sleeve gastrectomy    History of total right knee replacement    Noncompliance with CPAP treatment    Osteoarthritis of lumbar spine    Aortic atherosclerosis    Uterine fibroid    Impaired range of motion of both knees    Decreased strength of lower extremity    Impaired functional mobility, balance, gait, and endurance    Paroxysmal atrial fibrillation    History of right MCA stroke    Osteoarthritis of multiple joints    Diabetic polyneuropathy associated with type 2 diabetes mellitus    Allergies    History of COVID-19    History of staph infection           Current Outpatient Medications on File Prior to Visit   Medication Sig Dispense Refill    albuterol (VENTOLIN HFA) 90 mcg/actuation inhaler INHALE TWO PUFFS INTO LUNGS EVERY 4 TO 6 HOURS AS NEEDED FOR SHORTNESS OF BREATH AND FOR WHEEZING 18 g 1    allopurinoL (ZYLOPRIM) 300 MG tablet Take 1 tablet (300 mg total) by mouth 2 (two) times daily. 180 tablet 3    apixaban (ELIQUIS) 5 mg Tab Take 1 tablet (5 mg total) by mouth 2 (two) times daily. 60 tablet 6    atorvastatin (LIPITOR) 80 MG tablet Take 1 tablet (80 mg total) by mouth once daily. 90 tablet 3    b complex vitamins tablet Take 1 tablet by mouth every other day.       calcium citrate/vitamin D2 (ISIAH-CITRATE ORAL) Take 500 mg by mouth once daily.      colchicine (MITIGARE) 0.6 mg Cap One daily as needed for gout flare (Patient taking differently: One daily) 90 capsule 1    cyanocobalamin (VITAMIN B-12) 1000 MCG tablet Take 100 mcg by mouth every morning.      diclofenac sodium (VOLTAREN) 1 % Gel Apply 2 g topically 4 (four) times daily as needed. To painful area on the feet. 500 g 5    fluticasone propionate (FLONASE) 50 mcg/actuation nasal spray 1 SPRAY BY EACH NOSTRIL ROUTE 2 TIMES DAILY AS NEEDED FOR RHINITIS. 48 g 3    LIDOcaine (XYLOCAINE) 5 % Oint ointment APPLY TOPICALLY AS NEEDED. 35.44 g 6    metoprolol succinate (TOPROL-XL) 25 MG 24 hr tablet Take 1 tablet (25 mg  total) by mouth once daily. 90 tablet 3    MULTIVIT-IRON-MIN-FOLIC ACID 3,500-18-0.4 UNIT-MG-MG ORAL CHEW Take 1 tablet by mouth 2 (two) times daily.       [START ON 7/16/2024] mupirocin (BACTROBAN) 2 % ointment Apply to each nostril/nasal route 2 (two) times daily for 5 days 30 g 0    nystatin (MYCOSTATIN) powder Apply topically 2 (two) times daily. 60 g 3    omeprazole (PRILOSEC) 20 MG capsule TAKE 1 CAPSULE (20 MG TOTAL) BY MOUTH EVERY MORNING. 90 capsule 3    oxybutynin (DITROPAN-XL) 5 MG TR24 Take 1 tablet (5 mg total) by mouth once daily. 90 tablet 3    oxyCODONE-acetaminophen (PERCOCET)  mg per tablet Take 1 tablet by mouth every 8 (eight) hours as needed for Pain. 90 tablet 0    prednisoLONE acetate (PRED FORTE) 1 % DrpS Place 1 drop into the right eye 3 (three) times daily.      semaglutide (OZEMPIC) 1 mg/dose (4 mg/3 mL) Inject 1 mg into the skin every 7 days. 3 mL 11    urea (CARMOL) 40 % Crea Apply topically once daily. To dry skin on the feet. 120 g 5    vitamin D 185 MG Tab Take 5,000 mg by mouth every morning.       aspirin (ECOTRIN) 81 MG EC tablet Take 81 mg by mouth once daily. Start 4 days prior to surgery and take until Eliquis restarted per Dr. Hathaway      FLUoxetine 40 MG capsule Take 1 capsule (40 mg total) by mouth once daily. 90 capsule 3    tretinoin microspheres (RETIN-A MICRO PUMP) 0.08 % GlwP Apply to affected area daily 50 g 0     Current Facility-Administered Medications on File Prior to Visit   Medication Dose Route Frequency Provider Last Rate Last Admin    0.9%  NaCl infusion   Intravenous Continuous Lina Wagner MD 25 mL/hr at 12/15/20 1506 25 mL/hr at 12/15/20 1506    0.9%  NaCl infusion   Intravenous Continuous Ciro Chung MD        0.9%  NaCl infusion   Intravenous Continuous Santos Barron MD           Review of patient's allergies indicates:  No Known Allergies      ROS:  General ROS: negative  Respiratory ROS: no cough, shortness of breath, or  "wheezing  Cardiovascular ROS: no chest pain or dyspnea on exertion  Musculoskeletal ROS: negative  Neurological ROS:   negative for - gait disturbance, + numbness/tingling, seizures or tremors  Dermatological ROS: + nail changes, dry skin      LAST HbA1c:   Hemoglobin A1C   Date Value Ref Range Status   07/01/2024 6.9 (H) 4.0 - 5.6 % Final     Comment:     ADA Screening Guidelines:  5.7-6.4%  Consistent with prediabetes  >or=6.5%  Consistent with diabetes    High levels of fetal hemoglobin interfere with the HbA1C  assay. Heterozygous hemoglobin variants (HbS, HgC, etc)do  not significantly interfere with this assay.   However, presence of multiple variants may affect accuracy.     01/05/2024 6.2 (H) 4.0 - 5.6 % Final     Comment:     ADA Screening Guidelines:  5.7-6.4%  Consistent with prediabetes  >or=6.5%  Consistent with diabetes    High levels of fetal hemoglobin interfere with the HbA1C  assay. Heterozygous hemoglobin variants (HbS, HgC, etc)do  not significantly interfere with this assay.   However, presence of multiple variants may affect accuracy.     09/18/2023 6.3 (H) 4.0 - 5.6 % Final     Comment:     ADA Screening Guidelines:  5.7-6.4%  Consistent with prediabetes  >or=6.5%  Consistent with diabetes    High levels of fetal hemoglobin interfere with the HbA1C  assay. Heterozygous hemoglobin variants (HbS, HgC, etc)do  not significantly interfere with this assay.   However, presence of multiple variants may affect accuracy.           EXAM:   Vitals:    07/15/24 0912   BP: 124/81   Pulse: 76   Weight: 112.4 kg (247 lb 12.8 oz)   Height: 5' 5" (1.651 m)         General: Pt. is well-developed, well-nourished, appears stated age, in no acute distress, alert and oriented x 3.      Vascular: Dorsalis pedis and posterior tibial pulses are 2/4 bilaterally. 3 secs capillary refill time and toes are warm to touch.    There is reduced hair growth on the feet.    There is 1+ edema.  no varicosities.  " "      Neurological:  Light touch, proprioception, and sharp/dull sensation are all intact bilaterally. Protective threshold with the [place] monofilament is diminished bilaterally.   +paresthesias      Dermatological:  The skin is thin    The toenails  1-5 L and 1-5 R  are greenish- yellow, long by 5-6mm and thickened by 2-3mm with subungual debris  .   There are no open wounds. There is no ecchymoses.  no erythema noted.   Skin diffusely dry and xerotic on the soles, worse on the left.   Multiple nucleated     Interdigital spaces are intact, no fissures    Skin atrophic, thin, dry and mildly hyperpigmented.         Musculoskeletal:    Muscle strength is 5/5 in all groups bilaterally.    Metatarsophalangeal, subtalar, and ankle range of motion are intact without crepitus.     Ankle equinus bilateral           Assessment / Plan:      ICD-10-CM ICD-9-CM    1. Type II diabetes mellitus with neurological manifestations  E11.49 250.60 DIABETIC SHOES FOR HOME USE      Routine Foot Care      2. Corns and callus  L84 700 DIABETIC SHOES FOR HOME USE      Routine Foot Care      3. Dermatophytosis of nail  B35.1 110.1 Routine Foot Care                  I counseled the patient on the patient's conditions, their implications and medical management.  Shoe inspection.    Prescription diabetes shoes.  Continue good nutrition and blood sugar control to help prevent podiatric complications of diabetes.   Maintain proper foot hygiene.   Continue wearing proper shoe gear, daily foot inspections, never walking without protective shoe gear, never putting sharp instruments to feet.  Shoe and activity modification as needed for relief.   Continue urea cream daily. Socks to keep moisturized while sleeping.       Routine Foot Care    Date/Time: 7/15/2024 9:00 AM    Performed by: Stacey Rowland DPM  Authorized by: Stacey Rowland DPM    Time out: Immediately prior to procedure a "time out" was called to verify the correct patient, " procedure, equipment, support staff and site/side marked as required.    Hyperkeratotic Skin Lesions?: Yes    Number of trimmed lesions:  2  Location(s):  Left Heel and Left Midfoot    Nail Care Type:  Debride  Location(s): All  (Left 1st Toe, Left 3rd Toe, Left 2nd Toe, Left 4th Toe, Left 5th Toe, Right 1st Toe, Right 2nd Toe, Right 3rd Toe, Right 4th Toe and Right 5th Toe)  Patient tolerance:  Patient tolerated the procedure well with no immediate complications     With patient's permission, the toenails mentioned above were reduced and debrided using a nail nipper, removing offending nail and debris.   Utilizing a #15 scalpel, I trimmed the corns and calluses at the above mentioned location.      The patient will continue to monitor the areas daily, inspect the feet, wear protective shoe gear when ambulatory, and moisturizer to maintain skin integrity.

## 2024-07-15 NOTE — TELEPHONE ENCOUNTER
Called pt and stated that I would put in and order for the polar care so our DME department can send to [facility] to bill for the polar care. Also stated that I would have our DME check on the other items that were ordered and about delivery. Pt verbalized understanding and has no further questions.        ----- Message from Yuly Abbasi sent at 7/15/2024  3:19 PM CDT -----    ----- Message -----  From: Nikki Quintana  Sent: 7/15/2024  12:44 PM CDT  To: Sosa VERA Staff    Nature of Call: Requesting to get the DME issue resolved      Best Call Back Number:[phone]    Additional Information:  DONTAE LINDER CYR calling regarding Patient Advice for wanting to speak to Yuly about the DME issue that she was told to call back to inform her of from Fridays discussion.  PT stated that she was told by [facility] Rep to get a prescription for the equipment. stating that she is told that they are wanting to be billed directly, but according to the message in the portal, the DME wanted to have the PT pay for the items herself and be reimburse and she stated can not afford it and is why the prescription is being requested. Please call PT to inform PT if a prescription can be sent to [facility] for her to see if that will help her get the items she is needing   Stanislaw Peres's fax # if needed is [phone] claim # [id number]    Please include prescription for the polar ice machine if all of this request is possible

## 2024-07-17 ENCOUNTER — TELEPHONE (OUTPATIENT)
Dept: ORTHOPEDICS | Facility: CLINIC | Age: 59
End: 2024-07-17
Payer: COMMERCIAL

## 2024-07-17 ENCOUNTER — ANESTHESIA EVENT (OUTPATIENT)
Dept: SURGERY | Facility: HOSPITAL | Age: 59
End: 2024-07-17
Payer: MEDICARE

## 2024-07-17 PROBLEM — Z86.14 HISTORY OF MRSA INFECTION: Status: ACTIVE | Noted: 2024-07-01

## 2024-07-17 NOTE — TELEPHONE ENCOUNTER
I called the patient today regarding surgery on 07/18/2024 with Dr. Theodore Swan. I informed the patient that her surgery will be at  [facility] ([address] [place] [address]). I informed the patient they must arrive at 11:00am and their surgery will start at 1:00pm.     Per the [facility] COVID-19 Pre-Procedural Testing Policy (administered 10/26/2022), patients do not need tested for COVID-19 regardless of vaccination status.    I reminded the patient that they cannot eat or drink after midnight, the night before surgery.      I reminded the patient to be careful of their skin over the next few days to make sure they do not get any cuts, scratches or scrapes.    The patient has not yet received their walker, bedside commode or shower chair from [facility]. I told the patient that she needs to call [facility] today at [phone].    The patient verbalized understanding and has no further questions.

## 2024-07-18 ENCOUNTER — HOSPITAL ENCOUNTER (INPATIENT)
Facility: HOSPITAL | Age: 59
LOS: 3 days | Discharge: HOME HEALTH | DRG: 467 | End: 2024-07-21
Attending: ORTHOPAEDIC SURGERY | Admitting: ORTHOPAEDIC SURGERY
Payer: MEDICARE

## 2024-07-18 ENCOUNTER — ANESTHESIA (OUTPATIENT)
Dept: SURGERY | Facility: HOSPITAL | Age: 59
End: 2024-07-18
Payer: COMMERCIAL

## 2024-07-18 DIAGNOSIS — R53.81 DEBILITY: Primary | ICD-10-CM

## 2024-07-18 DIAGNOSIS — M41.25 OTHER IDIOPATHIC SCOLIOSIS, THORACOLUMBAR REGION: ICD-10-CM

## 2024-07-18 DIAGNOSIS — I95.9 HYPOTENSION: ICD-10-CM

## 2024-07-18 DIAGNOSIS — Z01.818 PRE-OP TESTING: ICD-10-CM

## 2024-07-18 DIAGNOSIS — T84.019A PROSTHETIC JOINT IMPLANT FAILURE: ICD-10-CM

## 2024-07-18 DIAGNOSIS — M53.2X2 CERVICAL SPINE INSTABILITY: ICD-10-CM

## 2024-07-18 DIAGNOSIS — T84.019D PROSTHETIC JOINT IMPLANT FAILURE, SUBSEQUENT ENCOUNTER: ICD-10-CM

## 2024-07-18 DIAGNOSIS — M50.30 DDD (DEGENERATIVE DISC DISEASE), CERVICAL: Chronic | ICD-10-CM

## 2024-07-18 DIAGNOSIS — M47.816 LUMBAR SPONDYLOSIS: ICD-10-CM

## 2024-07-18 LAB
APPEARANCE FLD: NORMAL
BODY FLD TYPE: NORMAL
COLOR FLD: NORMAL
LYMPHOCYTES NFR FLD MANUAL: 29 %
MONOS+MACROS NFR FLD MANUAL: 40 %
NEUTROPHILS NFR FLD MANUAL: 31 %
POCT GLUCOSE: 145 MG/DL (ref 70–110)
WBC # FLD: 1775 /CU MM

## 2024-07-18 PROCEDURE — 87116 MYCOBACTERIA CULTURE: CPT | Performed by: ORTHOPAEDIC SURGERY

## 2024-07-18 PROCEDURE — 0SRC0J9 REPLACEMENT OF RIGHT KNEE JOINT WITH SYNTHETIC SUBSTITUTE, CEMENTED, OPEN APPROACH: ICD-10-PCS | Performed by: ORTHOPAEDIC SURGERY

## 2024-07-18 PROCEDURE — 71000015 HC POSTOP RECOV 1ST HR: Performed by: ORTHOPAEDIC SURGERY

## 2024-07-18 PROCEDURE — 25000003 PHARM REV CODE 250: Performed by: STUDENT IN AN ORGANIZED HEALTH CARE EDUCATION/TRAINING PROGRAM

## 2024-07-18 PROCEDURE — 71000039 HC RECOVERY, EACH ADD'L HOUR: Performed by: ORTHOPAEDIC SURGERY

## 2024-07-18 PROCEDURE — 36000710: Performed by: ORTHOPAEDIC SURGERY

## 2024-07-18 PROCEDURE — 63600175 PHARM REV CODE 636 W HCPCS: Performed by: STUDENT IN AN ORGANIZED HEALTH CARE EDUCATION/TRAINING PROGRAM

## 2024-07-18 PROCEDURE — 64448 NJX AA&/STRD FEM NRV NFS IMG: CPT | Mod: 59,RT,, | Performed by: ANESTHESIOLOGY

## 2024-07-18 PROCEDURE — 11000001 HC ACUTE MED/SURG PRIVATE ROOM

## 2024-07-18 PROCEDURE — 99900035 HC TECH TIME PER 15 MIN (STAT)

## 2024-07-18 PROCEDURE — 64448 NJX AA&/STRD FEM NRV NFS IMG: CPT

## 2024-07-18 PROCEDURE — 36000711: Performed by: ORTHOPAEDIC SURGERY

## 2024-07-18 PROCEDURE — 87102 FUNGUS ISOLATION CULTURE: CPT | Mod: 59 | Performed by: ORTHOPAEDIC SURGERY

## 2024-07-18 PROCEDURE — 63600175 PHARM REV CODE 636 W HCPCS: Performed by: NURSE PRACTITIONER

## 2024-07-18 PROCEDURE — 94660 CPAP INITIATION&MGMT: CPT

## 2024-07-18 PROCEDURE — 63600175 PHARM REV CODE 636 W HCPCS: Performed by: ANESTHESIOLOGY

## 2024-07-18 PROCEDURE — 71000033 HC RECOVERY, INTIAL HOUR: Performed by: ORTHOPAEDIC SURGERY

## 2024-07-18 PROCEDURE — 87075 CULTR BACTERIA EXCEPT BLOOD: CPT | Mod: 59 | Performed by: ORTHOPAEDIC SURGERY

## 2024-07-18 PROCEDURE — 82962 GLUCOSE BLOOD TEST: CPT | Performed by: ORTHOPAEDIC SURGERY

## 2024-07-18 PROCEDURE — 25000003 PHARM REV CODE 250: Performed by: NURSE PRACTITIONER

## 2024-07-18 PROCEDURE — 37000008 HC ANESTHESIA 1ST 15 MINUTES: Performed by: ORTHOPAEDIC SURGERY

## 2024-07-18 PROCEDURE — 27201037 HC PRESSURE MONITORING SET UP

## 2024-07-18 PROCEDURE — 27000221 HC OXYGEN, UP TO 24 HOURS

## 2024-07-18 PROCEDURE — 88305 TISSUE EXAM BY PATHOLOGIST: CPT | Performed by: STUDENT IN AN ORGANIZED HEALTH CARE EDUCATION/TRAINING PROGRAM

## 2024-07-18 PROCEDURE — 37000009 HC ANESTHESIA EA ADD 15 MINS: Performed by: ORTHOPAEDIC SURGERY

## 2024-07-18 PROCEDURE — 27800903 OPTIME MED/SURG SUP & DEVICES OTHER IMPLANTS: Performed by: ORTHOPAEDIC SURGERY

## 2024-07-18 PROCEDURE — 71000016 HC POSTOP RECOV ADDL HR: Performed by: ORTHOPAEDIC SURGERY

## 2024-07-18 PROCEDURE — 63600175 PHARM REV CODE 636 W HCPCS

## 2024-07-18 PROCEDURE — 27201423 OPTIME MED/SURG SUP & DEVICES STERILE SUPPLY: Performed by: ORTHOPAEDIC SURGERY

## 2024-07-18 PROCEDURE — 0SPC0JZ REMOVAL OF SYNTHETIC SUBSTITUTE FROM RIGHT KNEE JOINT, OPEN APPROACH: ICD-10-PCS | Performed by: ORTHOPAEDIC SURGERY

## 2024-07-18 PROCEDURE — 27487 REVISE/REPLACE KNEE JOINT: CPT | Mod: RT,,, | Performed by: ORTHOPAEDIC SURGERY

## 2024-07-18 PROCEDURE — 87206 SMEAR FLUORESCENT/ACID STAI: CPT | Performed by: ORTHOPAEDIC SURGERY

## 2024-07-18 PROCEDURE — 94761 N-INVAS EAR/PLS OXIMETRY MLT: CPT

## 2024-07-18 PROCEDURE — C1713 ANCHOR/SCREW BN/BN,TIS/BN: HCPCS | Performed by: ORTHOPAEDIC SURGERY

## 2024-07-18 PROCEDURE — 25000003 PHARM REV CODE 250

## 2024-07-18 PROCEDURE — 87176 TISSUE HOMOGENIZATION CULTR: CPT | Performed by: ORTHOPAEDIC SURGERY

## 2024-07-18 PROCEDURE — 87070 CULTURE OTHR SPECIMN AEROBIC: CPT | Mod: 59 | Performed by: ORTHOPAEDIC SURGERY

## 2024-07-18 PROCEDURE — 89051 BODY FLUID CELL COUNT: CPT | Performed by: ORTHOPAEDIC SURGERY

## 2024-07-18 PROCEDURE — C1776 JOINT DEVICE (IMPLANTABLE): HCPCS | Performed by: ORTHOPAEDIC SURGERY

## 2024-07-18 PROCEDURE — 27000190 HC CPAP FULL FACE MASK W/VALVE

## 2024-07-18 PROCEDURE — 88300 SURGICAL PATH GROSS: CPT | Performed by: STUDENT IN AN ORGANIZED HEALTH CARE EDUCATION/TRAINING PROGRAM

## 2024-07-18 DEVICE — TOTAL STABILIZER FEMORAL COMPONENT
Type: IMPLANTABLE DEVICE | Site: FEMUR | Status: FUNCTIONAL
Brand: TRIATHLON

## 2024-07-18 DEVICE — FLUTED STEM
Type: IMPLANTABLE DEVICE | Site: FEMUR | Status: FUNCTIONAL
Brand: TRIATHLON

## 2024-07-18 DEVICE — TIBIAL AUGMENT HALF BLOCK
Type: IMPLANTABLE DEVICE | Site: FEMUR | Status: FUNCTIONAL
Brand: TRIATHLON

## 2024-07-18 DEVICE — FEMORAL POSTERIOR AUGMENT
Type: IMPLANTABLE DEVICE | Site: FEMUR | Status: FUNCTIONAL
Brand: TRIATHLON

## 2024-07-18 DEVICE — CEMENTED STEM
Type: IMPLANTABLE DEVICE | Site: FEMUR | Status: FUNCTIONAL
Brand: TRIATHLON

## 2024-07-18 DEVICE — CEMENT BONE SMPLX HV GENTMYCN: Type: IMPLANTABLE DEVICE | Site: KNEE | Status: FUNCTIONAL

## 2024-07-18 DEVICE — UNIVERSAL TIBIAL BASEPLATE
Type: IMPLANTABLE DEVICE | Site: FEMUR | Status: FUNCTIONAL
Brand: TRIATHLON

## 2024-07-18 DEVICE — UNIVERSAL CEMENT RESTRICTOR
Type: IMPLANTABLE DEVICE | Site: FEMUR | Status: FUNCTIONAL
Brand: RESTRICTOR

## 2024-07-18 DEVICE — OFFSET ADAPTER
Type: IMPLANTABLE DEVICE | Site: FEMUR | Status: FUNCTIONAL
Brand: TRIATHLON

## 2024-07-18 RX ORDER — PREGABALIN 75 MG/1
75 CAPSULE ORAL
Status: COMPLETED | OUTPATIENT
Start: 2024-07-18 | End: 2024-07-18

## 2024-07-18 RX ORDER — TRANEXAMIC ACID 100 MG/ML
INJECTION, SOLUTION INTRAVENOUS
Status: DISCONTINUED | OUTPATIENT
Start: 2024-07-18 | End: 2024-07-18

## 2024-07-18 RX ORDER — HYDROMORPHONE HYDROCHLORIDE 1 MG/ML
INJECTION, SOLUTION INTRAMUSCULAR; INTRAVENOUS; SUBCUTANEOUS
Status: DISCONTINUED | OUTPATIENT
Start: 2024-07-18 | End: 2024-07-18

## 2024-07-18 RX ORDER — FENTANYL CITRATE 50 UG/ML
25 INJECTION, SOLUTION INTRAMUSCULAR; INTRAVENOUS EVERY 5 MIN PRN
Status: DISCONTINUED | OUTPATIENT
Start: 2024-07-18 | End: 2024-07-18 | Stop reason: HOSPADM

## 2024-07-18 RX ORDER — NALOXONE HCL 0.4 MG/ML
0.02 VIAL (ML) INJECTION
Status: DISCONTINUED | OUTPATIENT
Start: 2024-07-18 | End: 2024-07-21 | Stop reason: HOSPADM

## 2024-07-18 RX ORDER — MUPIROCIN 20 MG/G
1 OINTMENT TOPICAL
Status: COMPLETED | OUTPATIENT
Start: 2024-07-18 | End: 2024-07-18

## 2024-07-18 RX ORDER — AMOXICILLIN 250 MG
1 CAPSULE ORAL 2 TIMES DAILY
Status: DISCONTINUED | OUTPATIENT
Start: 2024-07-18 | End: 2024-07-21 | Stop reason: HOSPADM

## 2024-07-18 RX ORDER — TALC
6 POWDER (GRAM) TOPICAL NIGHTLY PRN
Status: DISCONTINUED | OUTPATIENT
Start: 2024-07-18 | End: 2024-07-18 | Stop reason: HOSPADM

## 2024-07-18 RX ORDER — INSULIN ASPART 100 [IU]/ML
0-10 INJECTION, SOLUTION INTRAVENOUS; SUBCUTANEOUS
Status: DISCONTINUED | OUTPATIENT
Start: 2024-07-18 | End: 2024-07-21 | Stop reason: HOSPADM

## 2024-07-18 RX ORDER — GLUCAGON 1 MG
1 KIT INJECTION
Status: DISCONTINUED | OUTPATIENT
Start: 2024-07-18 | End: 2024-07-18 | Stop reason: SDUPTHER

## 2024-07-18 RX ORDER — PHENYLEPHRINE HYDROCHLORIDE 10 MG/ML
INJECTION INTRAVENOUS
Status: DISCONTINUED | OUTPATIENT
Start: 2024-07-18 | End: 2024-07-18

## 2024-07-18 RX ORDER — ROPIVACAINE HYDROCHLORIDE 2 MG/ML
INJECTION, SOLUTION EPIDURAL; INFILTRATION; PERINEURAL CONTINUOUS
Status: DISCONTINUED | OUTPATIENT
Start: 2024-07-18 | End: 2024-07-20

## 2024-07-18 RX ORDER — GLUCAGON 1 MG
1 KIT INJECTION
Status: DISCONTINUED | OUTPATIENT
Start: 2024-07-18 | End: 2024-07-21 | Stop reason: HOSPADM

## 2024-07-18 RX ORDER — SUCCINYLCHOLINE CHLORIDE 20 MG/ML
INJECTION INTRAMUSCULAR; INTRAVENOUS
Status: DISCONTINUED | OUTPATIENT
Start: 2024-07-18 | End: 2024-07-18

## 2024-07-18 RX ORDER — MIDAZOLAM HYDROCHLORIDE 1 MG/ML
.5-4 INJECTION, SOLUTION INTRAMUSCULAR; INTRAVENOUS
Status: DISCONTINUED | OUTPATIENT
Start: 2024-07-18 | End: 2024-07-18 | Stop reason: HOSPADM

## 2024-07-18 RX ORDER — IBUPROFEN 200 MG
16 TABLET ORAL
Status: DISCONTINUED | OUTPATIENT
Start: 2024-07-18 | End: 2024-07-21 | Stop reason: HOSPADM

## 2024-07-18 RX ORDER — FENTANYL CITRATE 50 UG/ML
100 INJECTION, SOLUTION INTRAMUSCULAR; INTRAVENOUS
Status: DISCONTINUED | OUTPATIENT
Start: 2024-07-18 | End: 2024-07-18

## 2024-07-18 RX ORDER — FAMOTIDINE 20 MG/1
20 TABLET, FILM COATED ORAL 2 TIMES DAILY
Status: DISCONTINUED | OUTPATIENT
Start: 2024-07-18 | End: 2024-07-21 | Stop reason: HOSPADM

## 2024-07-18 RX ORDER — ONDANSETRON HYDROCHLORIDE 2 MG/ML
INJECTION, SOLUTION INTRAVENOUS
Status: DISCONTINUED | OUTPATIENT
Start: 2024-07-18 | End: 2024-07-18

## 2024-07-18 RX ORDER — ROPIVACAINE HYDROCHLORIDE 5 MG/ML
INJECTION, SOLUTION EPIDURAL; INFILTRATION; PERINEURAL
Status: COMPLETED | OUTPATIENT
Start: 2024-07-18 | End: 2024-07-18

## 2024-07-18 RX ORDER — FLUOXETINE HYDROCHLORIDE 20 MG/1
40 CAPSULE ORAL DAILY
Status: DISCONTINUED | OUTPATIENT
Start: 2024-07-19 | End: 2024-07-21 | Stop reason: HOSPADM

## 2024-07-18 RX ORDER — ASPIRIN 81 MG/1
81 TABLET ORAL 2 TIMES DAILY
Status: DISCONTINUED | OUTPATIENT
Start: 2024-07-18 | End: 2024-07-18

## 2024-07-18 RX ORDER — CEFAZOLIN SODIUM 1 G/3ML
INJECTION, POWDER, FOR SOLUTION INTRAMUSCULAR; INTRAVENOUS
Status: DISCONTINUED | OUTPATIENT
Start: 2024-07-18 | End: 2024-07-18

## 2024-07-18 RX ORDER — IBUPROFEN 200 MG
24 TABLET ORAL
Status: DISCONTINUED | OUTPATIENT
Start: 2024-07-18 | End: 2024-07-21 | Stop reason: HOSPADM

## 2024-07-18 RX ORDER — MIDAZOLAM HYDROCHLORIDE 1 MG/ML
4 INJECTION, SOLUTION INTRAMUSCULAR; INTRAVENOUS
Status: DISCONTINUED | OUTPATIENT
Start: 2024-07-18 | End: 2024-07-18

## 2024-07-18 RX ORDER — BISACODYL 10 MG/1
10 SUPPOSITORY RECTAL EVERY 12 HOURS PRN
Status: DISCONTINUED | OUTPATIENT
Start: 2024-07-18 | End: 2024-07-21 | Stop reason: HOSPADM

## 2024-07-18 RX ORDER — PROCHLORPERAZINE EDISYLATE 5 MG/ML
5 INJECTION INTRAMUSCULAR; INTRAVENOUS EVERY 6 HOURS PRN
Status: DISCONTINUED | OUTPATIENT
Start: 2024-07-18 | End: 2024-07-21 | Stop reason: HOSPADM

## 2024-07-18 RX ORDER — DEXAMETHASONE SODIUM PHOSPHATE 4 MG/ML
INJECTION, SOLUTION INTRA-ARTICULAR; INTRALESIONAL; INTRAMUSCULAR; INTRAVENOUS; SOFT TISSUE
Status: DISCONTINUED | OUTPATIENT
Start: 2024-07-18 | End: 2024-07-18

## 2024-07-18 RX ORDER — LIDOCAINE HYDROCHLORIDE AND EPINEPHRINE 15; 5 MG/ML; UG/ML
INJECTION, SOLUTION EPIDURAL
Status: DISCONTINUED | OUTPATIENT
Start: 2024-07-18 | End: 2024-07-18

## 2024-07-18 RX ORDER — PROPOFOL 10 MG/ML
VIAL (ML) INTRAVENOUS
Status: DISCONTINUED | OUTPATIENT
Start: 2024-07-18 | End: 2024-07-18

## 2024-07-18 RX ORDER — LIDOCAINE HYDROCHLORIDE 10 MG/ML
1 INJECTION, SOLUTION EPIDURAL; INFILTRATION; INTRACAUDAL; PERINEURAL
Status: COMPLETED | OUTPATIENT
Start: 2024-07-18 | End: 2024-07-18

## 2024-07-18 RX ORDER — SODIUM CHLORIDE 0.9 % (FLUSH) 0.9 %
10 SYRINGE (ML) INJECTION
Status: DISCONTINUED | OUTPATIENT
Start: 2024-07-18 | End: 2024-07-18 | Stop reason: HOSPADM

## 2024-07-18 RX ORDER — ACETAMINOPHEN 500 MG
1000 TABLET ORAL EVERY 6 HOURS
Status: DISCONTINUED | OUTPATIENT
Start: 2024-07-18 | End: 2024-07-21 | Stop reason: HOSPADM

## 2024-07-18 RX ORDER — MUPIROCIN 20 MG/G
1 OINTMENT TOPICAL 2 TIMES DAILY
Status: DISCONTINUED | OUTPATIENT
Start: 2024-07-18 | End: 2024-07-21 | Stop reason: HOSPADM

## 2024-07-18 RX ORDER — SODIUM CHLORIDE 9 MG/ML
INJECTION, SOLUTION INTRAVENOUS CONTINUOUS
Status: DISCONTINUED | OUTPATIENT
Start: 2024-07-18 | End: 2024-07-19

## 2024-07-18 RX ORDER — FENTANYL CITRATE 50 UG/ML
25-200 INJECTION, SOLUTION INTRAMUSCULAR; INTRAVENOUS
Status: DISCONTINUED | OUTPATIENT
Start: 2024-07-18 | End: 2024-07-18 | Stop reason: HOSPADM

## 2024-07-18 RX ORDER — CELECOXIB 200 MG/1
400 CAPSULE ORAL ONCE
Status: COMPLETED | OUTPATIENT
Start: 2024-07-18 | End: 2024-07-18

## 2024-07-18 RX ORDER — PREGABALIN 75 MG/1
75 CAPSULE ORAL NIGHTLY
Status: DISCONTINUED | OUTPATIENT
Start: 2024-07-18 | End: 2024-07-21 | Stop reason: HOSPADM

## 2024-07-18 RX ORDER — ONDANSETRON HYDROCHLORIDE 2 MG/ML
4 INJECTION, SOLUTION INTRAVENOUS EVERY 8 HOURS PRN
Status: DISCONTINUED | OUTPATIENT
Start: 2024-07-18 | End: 2024-07-21 | Stop reason: HOSPADM

## 2024-07-18 RX ORDER — METOPROLOL SUCCINATE 25 MG/1
25 TABLET, EXTENDED RELEASE ORAL DAILY
Status: DISCONTINUED | OUTPATIENT
Start: 2024-07-19 | End: 2024-07-20

## 2024-07-18 RX ORDER — ACETAMINOPHEN 500 MG
1000 TABLET ORAL
Status: COMPLETED | OUTPATIENT
Start: 2024-07-18 | End: 2024-07-18

## 2024-07-18 RX ORDER — OXYCODONE HYDROCHLORIDE 5 MG/1
5 TABLET ORAL
Status: DISCONTINUED | OUTPATIENT
Start: 2024-07-18 | End: 2024-07-20

## 2024-07-18 RX ORDER — POLYETHYLENE GLYCOL 3350 17 G/17G
17 POWDER, FOR SOLUTION ORAL DAILY
Status: DISCONTINUED | OUTPATIENT
Start: 2024-07-19 | End: 2024-07-21 | Stop reason: HOSPADM

## 2024-07-18 RX ORDER — ASPIRIN 81 MG/1
81 TABLET ORAL 2 TIMES DAILY
Status: COMPLETED | OUTPATIENT
Start: 2024-07-18 | End: 2024-07-18

## 2024-07-18 RX ORDER — PROPOFOL 10 MG/ML
VIAL (ML) INTRAVENOUS CONTINUOUS PRN
Status: DISCONTINUED | OUTPATIENT
Start: 2024-07-18 | End: 2024-07-18

## 2024-07-18 RX ORDER — KETAMINE HCL IN 0.9 % NACL 50 MG/5 ML
SYRINGE (ML) INTRAVENOUS
Status: DISCONTINUED | OUTPATIENT
Start: 2024-07-18 | End: 2024-07-18

## 2024-07-18 RX ORDER — METHOCARBAMOL 750 MG/1
750 TABLET, FILM COATED ORAL 3 TIMES DAILY
Status: DISCONTINUED | OUTPATIENT
Start: 2024-07-18 | End: 2024-07-21 | Stop reason: HOSPADM

## 2024-07-18 RX ORDER — OXYCODONE HYDROCHLORIDE 10 MG/1
10 TABLET ORAL
Status: DISCONTINUED | OUTPATIENT
Start: 2024-07-18 | End: 2024-07-20

## 2024-07-18 RX ORDER — MORPHINE SULFATE 2 MG/ML
2 INJECTION, SOLUTION INTRAMUSCULAR; INTRAVENOUS
Status: DISCONTINUED | OUTPATIENT
Start: 2024-07-18 | End: 2024-07-20

## 2024-07-18 RX ORDER — SODIUM CHLORIDE 9 MG/ML
INJECTION, SOLUTION INTRAVENOUS
Status: DISCONTINUED | OUTPATIENT
Start: 2024-07-18 | End: 2024-07-18 | Stop reason: HOSPADM

## 2024-07-18 RX ADMIN — SODIUM CHLORIDE: 0.9 INJECTION, SOLUTION INTRAVENOUS at 12:07

## 2024-07-18 RX ADMIN — HYDROMORPHONE HYDROCHLORIDE 0.5 MG: 1 INJECTION, SOLUTION INTRAMUSCULAR; INTRAVENOUS; SUBCUTANEOUS at 05:07

## 2024-07-18 RX ADMIN — PHENYLEPHRINE HYDROCHLORIDE 100 MCG: 10 INJECTION INTRAVENOUS at 01:07

## 2024-07-18 RX ADMIN — Medication 10 MG: at 02:07

## 2024-07-18 RX ADMIN — HYDROMORPHONE HYDROCHLORIDE 1 MG: 1 INJECTION, SOLUTION INTRAMUSCULAR; INTRAVENOUS; SUBCUTANEOUS at 05:07

## 2024-07-18 RX ADMIN — PROPOFOL 15 MG: 10 INJECTION, EMULSION INTRAVENOUS at 03:07

## 2024-07-18 RX ADMIN — ACETAMINOPHEN 1000 MG: 500 TABLET ORAL at 12:07

## 2024-07-18 RX ADMIN — PROPOFOL 30 MG: 10 INJECTION, EMULSION INTRAVENOUS at 03:07

## 2024-07-18 RX ADMIN — TRANEXAMIC ACID 1000 MG: 100 INJECTION INTRAVENOUS at 03:07

## 2024-07-18 RX ADMIN — CEFAZOLIN 2 G: 2 INJECTION, POWDER, FOR SOLUTION INTRAMUSCULAR; INTRAVENOUS at 10:07

## 2024-07-18 RX ADMIN — PROPOFOL 20 MG: 10 INJECTION, EMULSION INTRAVENOUS at 04:07

## 2024-07-18 RX ADMIN — PROPOFOL 30 MG: 10 INJECTION, EMULSION INTRAVENOUS at 01:07

## 2024-07-18 RX ADMIN — ASPIRIN 81 MG: 81 TABLET, COATED ORAL at 09:07

## 2024-07-18 RX ADMIN — Medication 10 MG: at 03:07

## 2024-07-18 RX ADMIN — SENNOSIDES AND DOCUSATE SODIUM 1 TABLET: 50; 8.6 TABLET ORAL at 09:07

## 2024-07-18 RX ADMIN — MIDAZOLAM 2 MG: 1 INJECTION INTRAMUSCULAR; INTRAVENOUS at 12:07

## 2024-07-18 RX ADMIN — VANCOMYCIN HYDROCHLORIDE 1500 MG: 1.5 INJECTION, POWDER, LYOPHILIZED, FOR SOLUTION INTRAVENOUS at 12:07

## 2024-07-18 RX ADMIN — PHENYLEPHRINE HYDROCHLORIDE 0.4 MCG/KG/MIN: 10 INJECTION INTRAVENOUS at 01:07

## 2024-07-18 RX ADMIN — PHENYLEPHRINE HYDROCHLORIDE 100 MCG: 10 INJECTION INTRAVENOUS at 03:07

## 2024-07-18 RX ADMIN — MEPIVACAINE HYDROCHLORIDE 5 ML: 15 INJECTION, SOLUTION EPIDURAL; INFILTRATION at 03:07

## 2024-07-18 RX ADMIN — PREGABALIN 75 MG: 75 CAPSULE ORAL at 09:07

## 2024-07-18 RX ADMIN — SODIUM CHLORIDE: 9 INJECTION, SOLUTION INTRAVENOUS at 07:07

## 2024-07-18 RX ADMIN — CEFAZOLIN 2 G: 330 INJECTION, POWDER, FOR SOLUTION INTRAMUSCULAR; INTRAVENOUS at 01:07

## 2024-07-18 RX ADMIN — Medication: at 05:07

## 2024-07-18 RX ADMIN — ONDANSETRON 4 MG: 2 INJECTION INTRAMUSCULAR; INTRAVENOUS at 03:07

## 2024-07-18 RX ADMIN — SUCCINYLCHOLINE 120 MG: 20 INJECTION, SOLUTION INTRAMUSCULAR; INTRAVENOUS at 03:07

## 2024-07-18 RX ADMIN — PROPOFOL 10 MG: 10 INJECTION, EMULSION INTRAVENOUS at 01:07

## 2024-07-18 RX ADMIN — DEXAMETHASONE SODIUM PHOSPHATE 8 MG: 4 INJECTION, SOLUTION INTRAMUSCULAR; INTRAVENOUS at 01:07

## 2024-07-18 RX ADMIN — HYDROMORPHONE HYDROCHLORIDE 0.5 MG: 1 INJECTION, SOLUTION INTRAMUSCULAR; INTRAVENOUS; SUBCUTANEOUS at 04:07

## 2024-07-18 RX ADMIN — METHOCARBAMOL 750 MG: 750 TABLET ORAL at 09:07

## 2024-07-18 RX ADMIN — PHENYLEPHRINE HYDROCHLORIDE 80 MCG: 10 INJECTION INTRAVENOUS at 05:07

## 2024-07-18 RX ADMIN — ROPIVACAINE HYDROCHLORIDE 10 ML: 5 INJECTION EPIDURAL; INFILTRATION; PERINEURAL at 12:07

## 2024-07-18 RX ADMIN — ACETAMINOPHEN 1000 MG: 325 TABLET ORAL at 06:07

## 2024-07-18 RX ADMIN — CELECOXIB 400 MG: 200 CAPSULE ORAL at 12:07

## 2024-07-18 RX ADMIN — SODIUM CHLORIDE, SODIUM GLUCONATE, SODIUM ACETATE, POTASSIUM CHLORIDE, MAGNESIUM CHLORIDE, SODIUM PHOSPHATE, DIBASIC, AND POTASSIUM PHOSPHATE: .53; .5; .37; .037; .03; .012; .00082 INJECTION, SOLUTION INTRAVENOUS at 03:07

## 2024-07-18 RX ADMIN — LIDOCAINE HYDROCHLORIDE AND EPINEPHRINE 3 ML: 15; 5 INJECTION, SOLUTION EPIDURAL at 03:07

## 2024-07-18 RX ADMIN — OXYCODONE HYDROCHLORIDE 10 MG: 10 TABLET ORAL at 06:07

## 2024-07-18 RX ADMIN — MUPIROCIN 1 G: 20 OINTMENT TOPICAL at 09:07

## 2024-07-18 RX ADMIN — MUPIROCIN 1 G: 20 OINTMENT TOPICAL at 12:07

## 2024-07-18 RX ADMIN — PREGABALIN 75 MG: 75 CAPSULE ORAL at 12:07

## 2024-07-18 RX ADMIN — PROPOFOL 150 MCG/KG/MIN: 10 INJECTION, EMULSION INTRAVENOUS at 01:07

## 2024-07-18 RX ADMIN — Medication 20 MG: at 01:07

## 2024-07-18 RX ADMIN — TRANEXAMIC ACID 1000 MG: 100 INJECTION INTRAVENOUS at 01:07

## 2024-07-18 RX ADMIN — LIDOCAINE HYDROCHLORIDE 10 MG: 10 INJECTION, SOLUTION EPIDURAL; INFILTRATION; INTRACAUDAL; PERINEURAL at 12:07

## 2024-07-18 RX ADMIN — FAMOTIDINE 20 MG: 20 TABLET ORAL at 09:07

## 2024-07-18 RX ADMIN — FENTANYL CITRATE 50 MCG: 50 INJECTION INTRAMUSCULAR; INTRAVENOUS at 12:07

## 2024-07-18 NOTE — HOSPITAL COURSE
On 7/18/24, the patient arrived to the [facility] for proper pre-operative management.  Upon completion of pre-operative preparation, the patient was taken back to the operative theatre. Revision right TKA was performed without complication and the patient was transported to the post anesthesia care unit in stable condition.  After appropriate recovery from the anaesthetic agents used during the surgery, the patient was then transported to the hospital inpatient floor.  The interim of the hospital stay from arrival on the floor up to discharge has been uncomplicated. The patient has tolerated regular diet.  The patient's pain has been controlled using a multimodal approach. Currently, the patient's pain is well controlled on an oral regimen.  The patient has been voiding without difficulty.  The patient began participation in physical therapy after surgery and has progressed throughout the entire hospital stay.  Currently, the patient's progress is sufficient to allow the them to be discharged to home safely.  The patient agrees with this assessment and desires a discharge today.

## 2024-07-18 NOTE — PT/OT/SLP PROGRESS
Occupational Therapy      Patient Name:  Alivia Marie Cyr   MRN:  [id number]    Patient not seen today secondary to late surgery. Will follow-up tomorrow for OT karie.    7/18/2024

## 2024-07-18 NOTE — INTERVAL H&P NOTE
Alivia Lee [place] was interviewed, examined and the H and P reviewed.  There has been no interval change in her History and Physical.

## 2024-07-18 NOTE — PROGRESS NOTES
Physical Therapy     Patient Name:  Alivia Marie Cyr   MRN:  [id number]  Admitting Diagnosis:  Failed total right knee replacement   Recent Surgery: Procedure(s) (LRB):  REVISION, ARTHROPLASTY, KNEE: RIGHT (Right) Day of Surgery  Admit Date: 7/18/2024  Length of Stay: 0 days    Patient not seen today secondary to patient still in OR at 1638. Not appropriate to evaluate at this time. Will follow-up for progressive mobility evaluation 7/19/24 pending continued medical stability and patient participation.    7/18/2024

## 2024-07-18 NOTE — OP NOTE
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  Morbid obesity BMI 41  PROCEDURES PERFORMED:  Revision right total knee arthroplasty  SURGEON: Theodore Swan M.D.   ASSISTANT:  Harvey houston M.D. (RES).   ANESTHESIA:  Regional/neuraxial.   SPECIMEN:  Explant and soft tissue  FINDINGS:  Osteolysis on mainly in the lateral femoral condyle with pitting wear of the tibial insert    FLUID:  2 L  BLOOD LOSS:  200 cc  COMPLICATIONS: None.   COUNTS: Correct.   DISPOSITION: Recovery Room, stable.   IMPLANTS:   Implant Name Type Inv. Item Serial No.  Lot No. LRB No. Used Action   CEMENT BONE SMPLX HV GENTMYCN - [id number]  CEMENT BONE SMPLX HV GENTMYCN  BRANDAN Reachpod - [employer] KENNEDY. [id number] Right 3 Implanted   PLUG BONE - [id number]  PLUG BONE  BRANDAN Reachpod - [employer] KENNEDY. [id number] Right 1 Implanted   PIN FIX TEMP 1/8X2.5IN LF STER - [id number]  PIN FIX TEMP 1/8X2.5IN LF STER  BRANDAN Reachpod - [employer] KENNEDY. [id number] Right 1 Implanted and Explanted   STEM TRIATHLON FEM [id number] - [id number]  STEM TRIATHLON FEM [id number]  BRANDAN Reachpod - [employer] KENNEDY. [id number] Right 1 Implanted   ADAPTER FEM OFST TRIATHLON 4MM - [id number]  ADAPTER FEM OFST TRIATHLON 4MM  BRANDAN Reachpod - [employer] KENNEDY. [id number] Right 1 Implanted   SPACER POST FEMORAL SZ 4 5MM - [id number]  SPACER POST FEMORAL SZ 4 5MM  BRANDAN SALES KENNEDY. [id number] Right 1 Implanted   SPACER POST FEMORAL SZ 4 5MM - [id number]  SPACER POST FEMORAL SZ 4 5MM  BRANDAN SALES KENNEDY. [id number] Right 1 Implanted   COMP FEM TOTAL STABILIZED SZ 4 - [id number]  COMP FEM TOTAL STABILIZED SZ 4  BRANDAN Reachpod - [employer] KENNEDY. [id number] Right 1 Implanted   Triathlon Total Knee Cemented Stem 12mm KRISTIE 100mm LNTH    BRANDAN [id number] Right 1 Implanted   BASEPLATE SZ 3 TRI TS IMPLANT - [id number]  BASEPLATE SZ 3 TRI TS IMPLANT   BRANDANINCIDE. [id number] Right 1 Implanted   Triathlon X3 Total Stabilizer+ Tibial Insert LEONARD #3 THKNS 22mm TYP TS    BRANDAN [id number] Right 1 Implanted   Triathlon Tritanium Tibial Symmetric Cone Augment LEONARD E    BRANDAN [id number] Right 1 Implanted   SPACER TIBIAL SZ3 5MM - [id number]  SPACER TIBIAL SZ3 5MM  BRANDAN enercast KENNEDY. [id number] Right 1 Implanted   SPACER TIBIAL 35MM AUGMENT - [id number]  SPACER TIBIAL 35MM AUGMENT  BRANDANINCIDE. [id number] Right 1 Implanted       INDICATIONS FOR PROCEDURE: Ms Shearer is a 58-year-old female who had a right knee revision performed in 2014.  She had intermittent episodes of pain.  The pain was becoming more consistent and progressive over the last couple of years.  She underwent a thorough evaluation.  Infection was ruled out.  She had a recalled tibial insert. Working diagnosis was aseptic failure secondary to polyethylene wear and osteolysis.  Treatment options were explained turned decided to proceed with revision right total knee arthroplasty.  She was aware of reasonable treatment options as well as risks and benefits.  She elected to undergo the procedure.  PROCEDURE IN DETAIL:  After appropriate consent was obtained the patient was brought to the operating room.  She received a regional block beforehand.  Neuraxial anesthesia was administered.  She received antibiotic prophylaxis and a g of tranexamic acid prior to incision.  She also received a g of tranexamic acid at closure.  Cast padding and tourniquet applied to the proximal right thigh right lower extremity was prepped and draped in usual sterile fashion.  A time-out was called.  All team members participated.  Team members were encouraged to express concerns if they arose during the case.  Her prior incision was to be utilized.  The limb was elevated tourniquet was inflated.  It took the incision through the skin retinacular.  We developed full-thickness flaps.  A medial parapatellar arthrotomy followed by  quadriceps snip was performed.  Thorough synovectomy was performed.  Specimens were sent for soft tissue and  culture.  She would be noted that synovial fluid was sent for cell count.  The total white cell count was 1775 with only 31% segs.  This was consistent with our preoperative evaluation.  The mediolateral gutters were reestablished.  There was no undue tension on the extensor mechanism.  Good exposure was obtained.  I inspected the patella button.  There was significant wear.  The patella button was removed.  The patella was thin and fragmented and was opted not to resurface the patella.  The knee was flexed.  The polyethylene insert was removed.  There was pitting wear.  The femoral component was easily removed.  It was  completely from the cement mantle.  The cement mantle remained intact on the bone.  Posterior retractor was placed.  With the use of a saw osteotomes the tibial tray was  from the bony surface.  There was a medial bone fragment that was there was freed during the removal process.  This did not affect fixation or stability.  She had cement around the tibial keel which impeded the tibial stem and we were unable to remove the tibial component.  I therefore removed the tibial fixation screw.  With the use of a Lucius I removed the tibial tray.  I then used cement removal osteotomes to circumferentially free the cement from around the area of the tibial stem.  We then reattached the tibial tray, we inserted the screw and with the Lucius the tibial component came out quite easily.  Attention was then turned to the femur.  We removed all cement from the femur.  We also removed some cement fragments from the tibia.  The plan was to cement the stem for the tibia.  She had significant amount of metaphyseal bone loss in the tibia.  We reamed to accommodate a 12 x 100 cemented stem.  I opted for a longer stem due to the metaphyseal bone loss.  We also over reamed for a cone.  It was a  size E cone.  The tibia was sized found to be a size 3.  We prepared the proximal tibia.  A cleanup cut was made. We assembled the tibial trial and then attention was turned to the femur.  We reamed the femur to accommodate a size 19 stem.  Sized the femur found to be a size 3.  Made distal anterior-posterior chamfer and box cuts.  It should be noted that we  had removed a 15 mm insert so in order to build this up I decided to augment the tibia with 5 mm augments bilaterally.  So once this was accomplished and the femoral trial was was assembled and impacted we trialed with a series of inserts and I felt we were loose in flexion.  I then decided to up size the femur to a 4 and shifted posteriorly with the use of augments in order to address the posterior instability.  This worked quite well and with a 22 mm insert we had excellent flexion-extension gap balance.  We had full extension there was no recurvatum and we had 120° of flexion.  There was no instability at full extension mid flexion 90° of flexion or full flexion.  She had excellent varus valgus stability however due to the thickness of the insert we opted to use a constrained insert.  The patella tracked well.  Therefore at this point we were satisfied with the component position sizing and alignment knee range of motion and stability and patella tracking.  Trial components were removed.  The bone was prepared for cementing through pulsatile lavage and drying.  We inserted the cement plug in the tibia to the appropriate level.  We impacted our cone.  We used a cement gun to place cement in the tibial metaphyseal region.  Also placed cement on the posterior surface of the tibia.  We cemented the tibia.  A new batch of cement was mixed up for the femur and we cemented the femoral component in place.  Meticulous care was taken to remove all excess cement.  The tibial insert was firmly seated.  The locking bolt was engaged.  The knee was reduced.  It was held in  place in extension while the cement dried.  It was irrigated with a dilute Betadine solution for 3 minutes.  Once this was accomplished pulsatile lavage was used.  We closed the arthrotomy and quadriceps snip with 1. Vicryl.  The remainder the incision was closed with 0 Vicryl 2-0 Vicryl Monocryl and Dermabond.  A sterile dressing was applied.  The tourniquet was let down for a tourniquet time of 130 minutes.  She will be placed in a full length hinged knee brace.  He will be set at 0-90.  She may weight bear as tolerated.

## 2024-07-18 NOTE — ANESTHESIA PROCEDURE NOTES
Intubation    Date/Time: 7/18/2024 3:41 PM    Performed by: Mayra Parker CRNA  Authorized by: Mel Rdz MD    Intubation:     Induction:  Rapid sequence induction    Intubated:  Postinduction    Mask Ventilation:  Not attempted    Attempts:  1    Attempted By:  Staff anesthesiologist    Method of Intubation:  Video laryngoscopy    Blade:  Dewey 3    Laryngeal View Grade: Grade I - full view of cords      Difficult Airway Encountered?: No      Complications:  None    Airway Device:  Oral endotracheal tube    Airway Device Size:  7.5    Style/Cuff Inflation:  Cuffed (inflated to minimal occlusive pressure)    Tube secured:  22    Secured at:  The lips    Placement Verified By:  Capnometry    Complicating Factors:  None    Findings Post-Intubation:  BS equal bilateral and atraumatic/condition of teeth unchanged

## 2024-07-18 NOTE — ASSESSMENT & PLAN NOTE
Alivia Thomas is a 58 y.o. female is s/p R revision TKA on 7/18/24    Surgical dressing C/D/I, bulky dressing taken down today, ice in place  Pain control: multimodal, Anesthesia Surgical Home following  PT/OT: WBAT RLE   T scope 0-90 degrees   DVT PPx: home eliquis, FCDs at all times when not ambulating  Abx: postop Ancef, then cefadroxil PO x7 days  Drain: none  Kaufman: none    Dispo: plan to dc to home today once cleared by PT/OT     OCCUPATIONAL THERAPY EVALUATION - INPATIENT     Room Number: 2378/6180-C  Evaluation Date: 3/26/2019  Type of Evaluation: Initial  Presenting Problem: CABG x2    Physician Order: IP Consult to Occupational Therapy  Reason for Therapy: ADL/IADL Dysfunction Prior Level of Function: Pt very lethargic during initial eval, changing answers to PLOF questions throughout. OT to clarify Home/PLOF information when able. Pt reports independence with ADL and ambulation without AD prior to admit.       SUBJECTIVE which includes using toilet, bedpan or urinal? : Total  -   Putting on and taking off regular upper body clothing?: Total  -   Taking care of personal grooming such as brushing teeth?: A Little  -   Eating meals?: A Little    AM-PAC Score:  Score: 11  Appr Pending progress, pt may be appropriate for discharge home with [facility] therapy or to rehab.     Patient Complexity  Occupational Profile/Medical History LOW - Brief history including review of medical or therapy records    Specific performance deficits impactin

## 2024-07-18 NOTE — TRANSFER OF CARE
Anesthesia Transfer of Care Note    Patient: Alivia Thomas    Procedure(s) Performed: Procedure(s) (LRB):  REVISION, ARTHROPLASTY, KNEE: RIGHT (Right)    Patient location: PACU    Anesthesia Type: general    Transport from OR: Transported from OR on 2-3 L/min O2 by NC with adequate spontaneous ventilation    Post pain: adequate analgesia    Post assessment: no apparent anesthetic complications    Post vital signs: stable    Level of consciousness: alert, awake and oriented    Nausea/Vomiting: no nausea/vomiting    Complications: none    Transfer of care protocol was followed      Last vitals: Visit Vitals  BP 91/63   Pulse 65   Temp 36.3 °C (97.3 °F)   Resp 20   LMP 06/26/2018   SpO2 95%   Breastfeeding No

## 2024-07-18 NOTE — ANESTHESIA PREPROCEDURE EVALUATION
07/18/2024  Alivia Thomas is a 58 y.o., female.  Pre-operative evaluation for Procedure(s) (LRB):  REVISION, ARTHROPLASTY, KNEE: RIGHT (Right)    Alivia Thoams is a 58 y.o. female here for revision of R knee     Health conditions of significance for the perioperative period       Opioid use  Asthma  BMI 41.24   DM2  GERD  HLD  PADDY   AFIB - apixaban last sunday   MCA Stroke 1/2024 (no residual deficits)   Gout,   Ozempic Use, Monday 10days ago   Gastric Sleeve  History of COVID 2020       Review of patient's allergies indicates:   Allergen Reactions    Strawberry Anaphylaxis       Current Facility-Administered Medications on File Prior to Encounter   Medication Dose Route Frequency Provider Last Rate Last Admin    0.9%  NaCl infusion   Intravenous Continuous Lina Wagner MD 25 mL/hr at 12/15/20 1506 25 mL/hr at 12/15/20 1506    0.9%  NaCl infusion   Intravenous Continuous Ciro Chung MD        0.9%  NaCl infusion   Intravenous Continuous Santos Barron MD         Current Outpatient Medications on File Prior to Encounter   Medication Sig Dispense Refill    albuterol (VENTOLIN HFA) 90 mcg/actuation inhaler INHALE TWO PUFFS INTO LUNGS EVERY 4 TO 6 HOURS AS NEEDED FOR SHORTNESS OF BREATH AND FOR WHEEZING 18 g 1    allopurinoL (ZYLOPRIM) 300 MG tablet Take 1 tablet (300 mg total) by mouth 2 (two) times daily. 180 tablet 3    aspirin (ECOTRIN) 81 MG EC tablet Take 81 mg by mouth once daily. Start 4 days prior to surgery and take until Eliquis restarted per Dr. Hathaway      atorvastatin (LIPITOR) 80 MG tablet Take 1 tablet (80 mg total) by mouth once daily. 90 tablet 3    b complex vitamins tablet Take 1 tablet by mouth every other day.       calcium citrate/vitamin D2 (ISIAH-CITRATE ORAL) Take 500 mg by mouth once daily.      colchicine (MITIGARE) 0.6 mg Cap One daily as needed for gout flare  (Patient taking differently: One daily) 90 capsule 1    cyanocobalamin (VITAMIN B-12) 1000 MCG tablet Take 100 mcg by mouth every morning.      diclofenac sodium (VOLTAREN) 1 % Gel Apply 2 g topically 4 (four) times daily as needed. To painful area on the feet. 500 g 5    fluticasone propionate (FLONASE) 50 mcg/actuation nasal spray 1 SPRAY BY EACH NOSTRIL ROUTE 2 TIMES DAILY AS NEEDED FOR RHINITIS. 48 g 3    LIDOcaine (XYLOCAINE) 5 % Oint ointment APPLY TOPICALLY AS NEEDED. 35.44 g 6    metoprolol succinate (TOPROL-XL) 25 MG 24 hr tablet Take 1 tablet (25 mg total) by mouth once daily. 90 tablet 3    MULTIVIT-IRON-MIN-FOLIC ACID 3,500-18-0.4 UNIT-MG-MG ORAL CHEW Take 1 tablet by mouth 2 (two) times daily.       nystatin (MYCOSTATIN) powder Apply topically 2 (two) times daily. 60 g 3    omeprazole (PRILOSEC) 20 MG capsule TAKE 1 CAPSULE (20 MG TOTAL) BY MOUTH EVERY MORNING. 90 capsule 3    oxybutynin (DITROPAN-XL) 5 MG TR24 Take 1 tablet (5 mg total) by mouth once daily. 90 tablet 3    prednisoLONE acetate (PRED FORTE) 1 % DrpS Place 1 drop into the right eye 3 (three) times daily.      urea (CARMOL) 40 % Crea Apply topically once daily. To dry skin on the feet. 120 g 5    vitamin D 185 MG Tab Take 5,000 mg by mouth every morning.       apixaban (ELIQUIS) 5 mg Tab Take 1 tablet (5 mg total) by mouth 2 (two) times daily. 60 tablet 6    FLUoxetine 40 MG capsule Take 1 capsule (40 mg total) by mouth once daily. 90 capsule 3    semaglutide (OZEMPIC) 1 mg/dose (4 mg/3 mL) Inject 1 mg into the skin every 7 days. 3 mL 11    tretinoin microspheres (RETIN-A MICRO PUMP) 0.08 % GlwP Apply to affected area daily 50 g 0       Past Surgical History:   Procedure Laterality Date    CARPAL TUNNEL RELEASE      CARPAL TUNNEL RELEASE  1980s    left    CARPAL TUNNEL RELEASE  2012    right    CATARACT EXTRACTION W/  INTRAOCULAR LENS IMPLANT Left 08/08/2023    DR. STOKES    CATARACT EXTRACTION W/  INTRAOCULAR LENS IMPLANT Right  08/29/2023        COLONOSCOPY N/A 06/15/2020    Procedure: COLONOSCOPY;  Surgeon: Jc Koo MD;  Location: [facility] (4TH FLR);  Service: Endoscopy;  Laterality: N/A;  [facility]  pt updated on drop off location and no visitor policy-    EPIDURAL STEROID INJECTION N/A 07/24/2023    Procedure: INJECTION, STEROID, EPIDURAL, L5/S1 SOONER DATE;  Surgeon: Israel Hurtado MD;  Location: [facility];  Service: Pain Management;  Laterality: N/A;    ESOPHAGOGASTRODUODENOSCOPY N/A 03/27/2019    Procedure: EGD (ESOPHAGOGASTRODUODENOSCOPY);  Surgeon: Robbin Bar MD;  Location: [facility] (2ND FLR);  Service: Endoscopy;  Laterality: N/A;  BMI 61.3/2nd floor case/svn    INJECTION OF JOINT Bilateral 06/09/2020    Procedure: INJECTION, JOINT, BILATERAL SI;  Surgeon: Lina Wagner MD;  Location: [facility];  Service: Pain Management;  Laterality: Bilateral;  B/L SI Joint Injection    INJECTION OF JOINT Bilateral 05/11/2021    Procedure: INJECTION, JOINT, SACROILIAC (SI) need consent;  Surgeon: Lina Wagner MD;  Location: [facility];  Service: Pain Management;  Laterality: Bilateral;    INJECTION OF JOINT Bilateral 5/16/2024    Procedure: INJECTION, JOINT BILATERAL SI AND GTB;  Surgeon: Israel Hurtado MD;  Location: [facility];  Service: Pain Management;  Laterality: Bilateral;  [phone]  2 WK F/U BENJI    JOINT REPLACEMENT Bilateral     with 2 revisions on rt    KNEE SURGERY  03/2010    orthroscope    KNEE SURGERY  06/19/2014    left TKR    LAPAROSCOPIC SLEEVE GASTRECTOMY N/A 08/28/2019    Procedure: GASTRECTOMY, SLEEVE, LAPAROSCOPIC with intraop EGD;  Surgeon: Heriberto Clements MD;  Location: [facility] OR [address] [place] [address];  Service: General;  Laterality: N/A;    PANNICULECTOMY N/A 06/14/2022    Procedure: PANNICULECTOMY;  Surgeon: Rhett Gray MD;  Location: [facility] OR 2ND FLR;  Service: Plastics;  Laterality: N/A;    RADIOFREQUENCY ABLATION Right  07/09/2019    Procedure: RADIOFREQUENCY ABLATION;  Surgeon: Lina Wagner MD;  Location: [facility];  Service: Pain Management;  Laterality: Right;  RIGHT RFA L2,3,4,5  2 of 2    RADIOFREQUENCY ABLATION Left 12/19/2019    Procedure: RADIOFREQUENCY ABLATION, LEFT L2-L3-L4-L5 MEDIAL BRANCH 1 OF 2;  Surgeon: Lina Wagner MD;  Location: [facility];  Service: Pain Management;  Laterality: Left;    RADIOFREQUENCY ABLATION Right 12/31/2019    Procedure: RADIOFREQUENCY ABLATION, RIGHT L2-L3-L4-L5  2 OF 2;  Surgeon: Lina Wagner MD;  Location: [facility];  Service: Pain Management;  Laterality: Right;    RADIOFREQUENCY ABLATION Right 10/13/2020    Procedure: RADIOFREQUENCY ABLATION, Genicular Cooled 1 of 2;  Surgeon: Lina Wagner MD;  Location: [facility];  Service: Pain Management;  Laterality: Right;    RADIOFREQUENCY ABLATION Left 11/03/2020    Procedure: RADIOFREQUENCY ABLATION, GENICULAR COOLED  2 OF 2;  Surgeon: Lina Wagner MD;  Location: [facility];  Service: Pain Management;  Laterality: Left;    RADIOFREQUENCY ABLATION Left 12/15/2020    Procedure: RADIOFREQUENCY ABLATION LEFT L2,3,4,5 1 of 2;  Surgeon: Lina Wagner MD;  Location: [facility];  Service: Pain Management;  Laterality: Left;    RADIOFREQUENCY ABLATION Right 12/29/2020    Procedure: RADIOFREQUENCY ABLATION RIGHT L2,3,4,5 2 of 2;  Surgeon: Lina Wagner MD;  Location: [facility];  Service: Pain Management;  Laterality: Right;    RADIOFREQUENCY ABLATION Left 06/29/2021    Procedure: RADIOFREQUENCY ABLATION GENICULAR COOLED;  Surgeon: Lina Wagner MD;  Location: [facility];  Service: Pain Management;  Laterality: Left;  1 of 2    RADIOFREQUENCY ABLATION Right 07/13/2021    Procedure: RADIOFREQUENCY ABLATION GENICULAR;  Surgeon: Lina Wagner MD;  Location: [facility];  Service: Pain Management;  Laterality: Right;  2 of 2    RADIOFREQUENCY ABLATION Right 08/24/2021    Procedure:  RADIOFREQUENCY ABLATION, L2-L3-L4-L5 MEDIAL BRANCH 1 OF 2;  Surgeon: Lina Wagner MD;  Location: [facility];  Service: Pain Management;  Laterality: Right;    RADIOFREQUENCY ABLATION Left 09/11/2021    Procedure: RADIOFREQUENCY ABLATION, L2-L3-L4-L5 MEDIAL BR ANCH 2 OF 2;  Surgeon: Lina Wagner MD;  [facility];  Service: Pain Management;  Laterality: Left;    RADIOFREQUENCY ABLATION Right 03/07/2022    Procedure: RADIOFREQUENCY ABLATION RIGHT L3,4,5 1 of 2, needs consent;  Surgeon: Israel Hurtado MD;  Location: [facility] PAIN [facility]GT;  Service: Pain Management;  Laterality: Right;    RADIOFREQUENCY ABLATION Left 03/21/2022    Procedure: RADIOFREQUENCY ABLATION RIGHT L3,4,5 2 of 2, needs consent;  Surgeon: Israel Hurtado MD;  Location: [facility] PAIN [facility]GT;  Service: Pain Management;  Laterality: Left;    RADIOFREQUENCY ABLATION Right 05/09/2022    Procedure: RADIOFREQUENCY ABLATION RIGHT GENICULAR COOLED 1 of 2;  Surgeon: Israel Hurtado MD;  Location: [facility] PAIN MGT;  Service: Pain Management;  Laterality: Right;    RADIOFREQUENCY ABLATION Left 05/23/2022    Procedure: RADIOFREQUENCY ABLATION LEFT GENICULAR COOLED  2 of 2;  Surgeon: Israel Hurtado MD;  Location: [facility] PAIN MGT;  Service: Pain Management;  Laterality: Left;    RADIOFREQUENCY ABLATION Right 12/12/2022    Procedure: RADIOFREQUENCY ABLATION RIGHT GENICULAR COOLED  ONE OF TWO  NEEDS CONSENT;  Surgeon: Israel Hurtado MD;  Location: [facility]IN [facility]GT;  Service: Pain Management;  Laterality: Right;    RADIOFREQUENCY ABLATION Left 01/09/2023    Procedure: RADIOFREQUENCY ABLATION LEFT GENICULAR COOLED  TWO OF TWO NEEDS CONSENT;  Surgeon: Israel Hurtado MD;  Location: [facility] PAIN [facility]GT;  Service: Pain Management;  Laterality: Left;    RADIOFREQUENCY ABLATION Right 03/06/2023    Procedure: RADIOFREQUENCY ABLATION RIGHT L3,L4,L5;  Surgeon: Israel Hurtado MD;  Location: [facility]IN [facility]GT;  Service: Pain Management;  Laterality: Right;    RADIOFREQUENCY ABLATION Left 05/22/2023     Procedure: RADIOFREQUENCY ABLATION LEFT L3,L4,L5;  Surgeon: Israel Hurtado MD;  Location: [facility]IN [facility]GT;  Service: Pain Management;  Laterality: Left;  4/3 LM TO R/S    RADIOFREQUENCY ABLATION Right 11/27/2023    Procedure: RADIOFREQUENCY ABLATION RIGHT L3, 4, 5 1 OF 3;  Surgeon: Israel Hurtado MD;  Location: [facility] PAIN [facility]GT;  Service: Pain Management;  Laterality: Right;    RADIOFREQUENCY ABLATION Right 01/22/2024    Procedure: RADIOFREQUENCY ABLATION RIGHT GENICULAR 3 NERVES 3 OF 3;  Surgeon: Israel Hurtado MD;  Location: [facility]IN [facility]GT;  Service: Pain Management;  Laterality: Right;    RADIOFREQUENCY ABLATION Left 02/12/2024    Procedure: RADIOFREQUENCY ABLATION LEFT L3, 4, 5;  Surgeon: Israel Hurtado MD;  Location: [facility]IN [facility]GT;  Service: Pain Management;  Laterality: Left;  [phone]    RADIOFREQUENCY ABLATION OF LUMBAR MEDIAL BRANCH NERVE AT SINGLE LEVEL Left 06/26/2018    Procedure: RADIOFREQUENCY ABLATION, NERVE, MEDIAL BRANCH, LUMBAR, 1 LEVEL;  Surgeon: Lina Wagner MD;  Location: [facility];  Service: Pain Management;  Laterality: Left;  Left Cooled RFA @ L2,3,4,5  [phone]  with Sedation    1 of 2    RADIOFREQUENCY ABLATION OF LUMBAR MEDIAL BRANCH NERVE AT SINGLE LEVEL Right 07/10/2018    Procedure: RADIOFREQUENCY ABLATION, NERVE, MEDIAL BRANCH, LUMBAR, 1 LEVEL;  Surgeon: Lina Wagner MD;  Location: [facility]IN [facility]GT;  Service: Pain Management;  Laterality: Right;  RIght Cooled RFA @ L2,3,4,5  [phone] with Sedation    2 of 2    TRIGGER FINGER RELEASE Right 2017    TRIGGER FINGER RELEASE Left 07/29/2019    Procedure: RELEASE, TRIGGER FINGER, Left Thumb;  Surgeon: Velma Garcia MD;  Location: [facility] OR;  Service: Orthopedics;  Laterality: Left;  Local w/ MAC       Social History     Socioeconomic History    Marital status:    Tobacco Use    Smoking status: Never    Smokeless tobacco: Never   Substance and Sexual Activity    Alcohol use: Not Currently     Comment: occasionally      Drug use: No    Sexual activity: Yes     Partners: Female     Birth control/protection: None   Social History Narrative    Disabled. The patient is the youngest of 6 siblings.  Lives with single-sex partner.                  Social Determinants of Health     Financial Resource Strain: Medium Risk (1/17/2024)    Overall Financial Resource Strain (CARDIA)     Difficulty of Paying Living Expenses: Somewhat hard   Food Insecurity: No Food Insecurity (1/17/2024)    Hunger Vital Sign     Worried About Running Out of Food in the Last Year: Never true     Ran Out of Food in the Last Year: Never true   Transportation Needs: No Transportation Needs (1/17/2024)    PRAPARE - Transportation     Lack of Transportation (Medical): No     Lack of Transportation (Non-Medical): No   Physical Activity: Insufficiently Active (1/17/2024)    Exercise Vital Sign     Days of Exercise per Week: 4 days     Minutes of Exercise per Session: 30 min   Stress: Stress Concern Present (1/17/2024)    [employer] - Occupational Stress Questionnaire     Feeling of Stress : To some extent   Housing Stability: High Risk (1/17/2024)    Housing Stability Vital Sign     Unable to Pay for Housing in the Last Year: Yes     Number of Places Lived in the Last Year: 1     Unstable Housing in the Last Year: No             Diagnostic Studies:  Echo Jan 5 th 2024       Left Ventricle: The left ventricle is normal in size. Ventricular mass is normal. Normal wall thickness. Normal wall motion. There is normal systolic function with a visually estimated ejection fraction of 60 - 65%. Ejection fraction by visual approximation is 63%. There is normal diastolic function.    Right Ventricle: Normal right ventricular cavity size. Wall thickness is normal. Right ventricle wall motion  is normal. Systolic function is normal.    Left Atrium: Left atrium is mildly dilated.    Right Atrium: Right atrium is mildly dilated.    Aortic Valve: The  aortic valve is a trileaflet valve. There is aortic valve sclerosis.    Tricuspid Valve: There is mild regurgitation.    Pulmonary Artery: The estimated pulmonary artery systolic pressure is 32 mmHg.    IVC/SVC: Normal venous pressure at 3 mmHg.     Jan 2024      Cardiac event monitoring    Baseline rhythm was normal sinus rhythm with normal intervals and an initial heart rate of 73 bpm.    There were 61 patient-triggered episodes with reported symptoms of heart racing and shortness of breath. These episodes corresponded to periods of normal sinus rhythm and sinus tachycardia with occasional PVCs and frequent PACs.    There were occasional PVCs and frequent PACs.    There was an event of paroxysmal atrial fibrillation captured on 1/27/2024, at times with rapid ventricular response.          Pre-op Assessment    I have reviewed the Patient Summary Reports.     I have reviewed the Nursing Notes. I have reviewed the NPO Status.   I have reviewed the Medications.     Review of Systems  Anesthesia Hx:  No problems with previous Anesthesia   History of prior surgery of interest to airway management or planning:            Denies Personal Hx of Anesthesia complications.                    Cardiovascular:     Hypertension    Dysrhythmias                                    Pulmonary:    Asthma    Sleep Apnea                Hepatic/GI:     GERD             Musculoskeletal:  Arthritis               Neurological:   CVA, no residual symptoms                                    Endocrine:  Diabetes               Physical Exam  General: Well nourished, Cooperative, Alert and Oriented    Airway:  Mallampati: III   Mouth Opening: Normal  Tongue: Normal    Dental:  Intact        Anesthesia Plan  Type of Anesthesia, risks & benefits discussed:    Anesthesia Type: CSE  Intra-op Monitoring Plan: Standard ASA Monitors  Post Op Pain Control Plan: multimodal analgesia and peripheral nerve block  Induction:  IV  Airway Plan: Direct,  Post-Induction  Informed Consent: Informed consent signed with the Patient and all parties understand the risks and agree with anesthesia plan.  All questions answered.   ASA Score: 3    Ready For Surgery From Anesthesia Perspective.     .

## 2024-07-18 NOTE — ANESTHESIA PROCEDURE NOTES
CSE    Patient location during procedure: OR  Start time: 7/18/2024 1:10 PM  Timeout: 7/18/2024 1:10 PM  End time: 7/18/2024 1:14 PM      Staffing  Authorizing Provider: Mel Rdz MD  Performing Provider: Mel Rdz MD    Staffing  Performed by: Mel Rdz MD  Authorized by: Mel Rdz MD    Preanesthetic Checklist  Completed: patient identified, IV checked, site marked, risks and benefits discussed, surgical consent, monitors and equipment checked, pre-op evaluation and timeout performed  CSE  Patient position: sitting  Prep: ChloraPrep  Patient monitoring: continuous pulse ox and frequent blood pressure checks  Approach: midline  Spinal Needle  Needle type: pencil-tip   Needle gauge: 25 G  Needle length: 5 in  Epidural Needle  Injection technique: REYNA air  Needle type: Tuohy   Needle gauge: 17 G  Needle length: 3.5 in  Needle insertion depth: 5 cm  Location: L4-5  Needle localization: anatomical landmarks   Catheter  Catheter type: springwound  Catheter size: 19 G  Catheter at skin depth: 9 cm  Assessment  Intrathecal Medications:   administered: primary anesthetic mcg of    Medications:    Medications: Intrathecal - mepivacaine 15 mg/mL (1.5 %) injection - Intrathecal   3 mL - 7/18/2024 1:14:00 PM

## 2024-07-18 NOTE — SUBJECTIVE & OBJECTIVE
Principal Problem:Failed total right knee replacement    Principal Orthopedic Problem: same    Interval History: Patient seen and examined at bedside. S/p right revsion TKA on 7/18/24. NAEON. Patient doing well. Pain well controlled.  Patient to mobilize with PT today.      Review of patient's allergies indicates:   Allergen Reactions    Strawberry Anaphylaxis       Current Facility-Administered Medications   Medication    0.9%  NaCl infusion    ceFAZolin 2 g in D5W 50 mL IVPB (MB+)    fentaNYL 50 mcg/mL injection  mcg    midazolam injection 0.5-4 mg    midazolam injection 4 mg    tranexamic acid (CYKLOKAPRON) 1,000 mg in 0.9% NaCl 100 mL IVPB (MB+)    tranexamic acid (CYKLOKAPRON) 1,000 mg in 0.9% NaCl 100 mL IVPB (MB+)    vancomycin 1,500 mg in D5W 250 mL IVPB (Vial-Mate)     Facility-Administered Medications Ordered in Other Encounters   Medication    0.9%  NaCl infusion    0.9%  NaCl infusion    0.9%  NaCl infusion     Objective:     Vital Signs (Most Recent):  Temp: 97.3 °F (36.3 °C) (07/18/24 1229)  Pulse: (!) 56 (07/18/24 1229)  Resp: 18 (07/18/24 1229)  BP: 123/65 (07/18/24 1229)  SpO2: 100 % (07/18/24 1229) Vital Signs (24h Range):  Temp:  [97.3 °F (36.3 °C)] 97.3 °F (36.3 °C)  Pulse:  [56] 56  Resp:  [18] 18  SpO2:  [100 %] 100 %  BP: (123)/(65) 123/65           There is no height or weight on file to calculate BMI.    No intake or output data in the 24 hours ending 07/18/24 1239     Ortho/SPM Exam  AAOx4  NAD  Reg rate  No increased WOB    RLE:  Dressing c/d/i  Polar ice in place  SILT T/SP/DP/Mclean/Sa  Motor intact T/SP/DP  WWP extremities  FCDs in place and functioning  T scope brace in place       Significant Labs: All pertinent labs within the past 24 hours have been reviewed.    Significant Imaging: I have reviewed all pertinent imaging results/findings.

## 2024-07-18 NOTE — HPI
Alivia Thomas is a 58 y.o. female with history of Right knee pain. Pain is worse with activity and weight bearing.  Patient has experienced interference of activities of daily living due to decreased range of motion and an increase in joint pain and swelling.  Patient has failed non-operative treatment including NSAIDs and activity modification.  Alivia Thomas currently ambulates independently.      Relevant medical conditions of significance in perioperative period:  Type 2 DM- on medication managed by pcp  HTN- on medication managed by pcp  Chronic eliquis use for afib with h/o cva- managed by pcp      Given documented medical comorbidities including those listed above and based off of CMS criteria patient would meet inpatient admission status guidelines. This case has been approved as inpatient.

## 2024-07-18 NOTE — ANESTHESIA PROCEDURE NOTES
R Adductor PNC    Patient location during procedure: pre-op   Block not for primary anesthetic.  Reason for block: at surgeon's request and post-op pain management   Post-op Pain Location: R Leg Pain   Start time: 7/18/2024 12:45 PM  Timeout: 7/18/2024 12:45 PM   End time: 7/18/2024 12:55 PM    Staffing  Authorizing Provider: Geraldine Gonzalez MD  Performing Provider: Letitia Leal MD    Staffing  Performed by: Letitia Leal MD  Authorized by: Geraldine Gonzalez MD    Preanesthetic Checklist  Completed: patient identified, IV checked, site marked, risks and benefits discussed, surgical consent, monitors and equipment checked, pre-op evaluation and timeout performed  Peripheral Block  Patient position: supine  Prep: ChloraPrep and site prepped and draped  Patient monitoring: heart rate, cardiac monitor, continuous pulse ox, continuous capnometry and frequent blood pressure checks  Block type: adductor canal  Laterality: right  Injection technique: continuous  Needle  Needle type: Tuohy   Needle gauge: 17 G  Needle length: 3.5 in  Needle localization: anatomical landmarks and ultrasound guidance  Catheter type: spring wound  Catheter size: 19 G  Test dose: lidocaine 1.5% with Epi 1-to-200,000 and negative   -ultrasound image captured on disc.  Assessment  Injection assessment: negative aspiration, negative parasthesia and local visualized surrounding nerve  Paresthesia pain: none  Heart rate change: no  Slow fractionated injection: yes  Pain Tolerance: comfortable throughout block and no complaints  Medications:    Medications: ropivacaine (NAROPIN) injection 0.5% - Perineural   10 mL - 7/18/2024 12:52:00 PM    Additional Notes  VSS.  [facility] RN monitoring vitals throughout procedure.  Patient tolerated procedure well.

## 2024-07-18 NOTE — PLAN OF CARE
CADD teaching done with sister, Harley Mueller ([phone]). Here in the lobby.  I used the sister's phone and showed her how to use the QR code.  We also sent the video via text to the pt's phone per her request.      Reviewed the side effects we would request they pause/stop the pump and call us to report.@[phone]. Also reminded her this is a 24/7 call line to reach us for anesthesia concerns, which I highlighted on the brochure. Reviewed the 24/7 CADD nurse assist line.  Voiced understanding    Her wife Suri can be reached at [phone].  Explained will reach out to them days 1 and 4.  Voiced understanding.    All questions answered.

## 2024-07-19 LAB
ACID FAST MOD KINY STN SPEC: NORMAL
ALBUMIN SERPL BCP-MCNC: 3 G/DL (ref 3.5–5.2)
ALLENS TEST: ABNORMAL
ALLENS TEST: NORMAL
ALP SERPL-CCNC: 53 U/L (ref 55–135)
ALT SERPL W/O P-5'-P-CCNC: 21 U/L (ref 10–44)
ANION GAP SERPL CALC-SCNC: 7 MMOL/L (ref 8–16)
ANION GAP SERPL CALC-SCNC: 7 MMOL/L (ref 8–16)
AST SERPL-CCNC: 20 U/L (ref 10–40)
BASOPHILS # BLD AUTO: 0.04 K/UL (ref 0–0.2)
BASOPHILS NFR BLD: 0.4 % (ref 0–1.9)
BILIRUB SERPL-MCNC: 0.5 MG/DL (ref 0.1–1)
BNP SERPL-MCNC: 128 PG/ML (ref 0–99)
BUN SERPL-MCNC: 18 MG/DL (ref 6–20)
BUN SERPL-MCNC: 26 MG/DL (ref 6–20)
CALCIUM SERPL-MCNC: 7.8 MG/DL (ref 8.7–10.5)
CALCIUM SERPL-MCNC: 8.4 MG/DL (ref 8.7–10.5)
CHLORIDE SERPL-SCNC: 111 MMOL/L (ref 95–110)
CHLORIDE SERPL-SCNC: 114 MMOL/L (ref 95–110)
CO2 SERPL-SCNC: 19 MMOL/L (ref 23–29)
CO2 SERPL-SCNC: 23 MMOL/L (ref 23–29)
CREAT SERPL-MCNC: 0.8 MG/DL (ref 0.5–1.4)
CREAT SERPL-MCNC: 0.9 MG/DL (ref 0.5–1.4)
DIFFERENTIAL METHOD BLD: ABNORMAL
EOSINOPHIL # BLD AUTO: 0.1 K/UL (ref 0–0.5)
EOSINOPHIL NFR BLD: 0.6 % (ref 0–8)
ERYTHROCYTE [DISTWIDTH] IN BLOOD BY AUTOMATED COUNT: 15 % (ref 11.5–14.5)
EST. GFR  (NO RACE VARIABLE): >60 ML/MIN/1.73 M^2
EST. GFR  (NO RACE VARIABLE): >60 ML/MIN/1.73 M^2
FUNGUS SPEC CULT: NORMAL
GLUCOSE SERPL-MCNC: 118 MG/DL (ref 70–110)
GLUCOSE SERPL-MCNC: 150 MG/DL (ref 70–110)
HCO3 UR-SCNC: 21.6 MMOL/L (ref 24–28)
HCT VFR BLD AUTO: 35.3 % (ref 37–48.5)
HCT VFR BLD CALC: 30 %PCV (ref 36–54)
HGB BLD-MCNC: 11.3 G/DL (ref 12–16)
IMM GRANULOCYTES # BLD AUTO: 0.03 K/UL (ref 0–0.04)
IMM GRANULOCYTES NFR BLD AUTO: 0.3 % (ref 0–0.5)
LACTATE SERPL-SCNC: 1.1 MMOL/L (ref 0.5–2.2)
LDH SERPL L TO P-CCNC: 0.65 MMOL/L (ref 0.5–2.2)
LYMPHOCYTES # BLD AUTO: 1 K/UL (ref 1–4.8)
LYMPHOCYTES NFR BLD: 8.5 % (ref 18–48)
MAGNESIUM SERPL-MCNC: 1.9 MG/DL (ref 1.6–2.6)
MCH RBC QN AUTO: 32.1 PG (ref 27–31)
MCHC RBC AUTO-ENTMCNC: 32 G/DL (ref 32–36)
MCV RBC AUTO: 100 FL (ref 82–98)
MONOCYTES # BLD AUTO: 0.6 K/UL (ref 0.3–1)
MONOCYTES NFR BLD: 5.6 % (ref 4–15)
MYCOBACTERIUM SPEC QL CULT: NORMAL
NEUTROPHILS # BLD AUTO: 9.6 K/UL (ref 1.8–7.7)
NEUTROPHILS NFR BLD: 84.6 % (ref 38–73)
NRBC BLD-RTO: 0 /100 WBC
PCO2 BLDA: 47.7 MMHG (ref 35–45)
PH SMN: 7.26 [PH] (ref 7.35–7.45)
PLATELET # BLD AUTO: 196 K/UL (ref 150–450)
PMV BLD AUTO: 10.8 FL (ref 9.2–12.9)
PO2 BLDA: 48 MMHG (ref 40–60)
POC BE: -5 MMOL/L
POC IONIZED CALCIUM: 1.23 MMOL/L (ref 1.06–1.42)
POC SATURATED O2: 77 % (ref 95–100)
POC TCO2: 23 MMOL/L (ref 24–29)
POCT GLUCOSE: 104 MG/DL (ref 70–110)
POCT GLUCOSE: 125 MG/DL (ref 70–110)
POCT GLUCOSE: 272 MG/DL (ref 70–110)
POTASSIUM BLD-SCNC: 4 MMOL/L (ref 3.5–5.1)
POTASSIUM SERPL-SCNC: 4.1 MMOL/L (ref 3.5–5.1)
POTASSIUM SERPL-SCNC: 4.5 MMOL/L (ref 3.5–5.1)
PROT SERPL-MCNC: 6.2 G/DL (ref 6–8.4)
RBC # BLD AUTO: 3.52 M/UL (ref 4–5.4)
SAMPLE: ABNORMAL
SAMPLE: NORMAL
SITE: ABNORMAL
SITE: NORMAL
SODIUM BLD-SCNC: 143 MMOL/L (ref 136–145)
SODIUM SERPL-SCNC: 140 MMOL/L (ref 136–145)
SODIUM SERPL-SCNC: 141 MMOL/L (ref 136–145)
WBC # BLD AUTO: 11.36 K/UL (ref 3.9–12.7)

## 2024-07-19 PROCEDURE — 25000003 PHARM REV CODE 250

## 2024-07-19 PROCEDURE — 97161 PT EVAL LOW COMPLEX 20 MIN: CPT

## 2024-07-19 PROCEDURE — 36415 COLL VENOUS BLD VENIPUNCTURE: CPT | Performed by: ORTHOPAEDIC SURGERY

## 2024-07-19 PROCEDURE — 93010 ELECTROCARDIOGRAM REPORT: CPT | Mod: ,,, | Performed by: INTERNAL MEDICINE

## 2024-07-19 PROCEDURE — 21400001 HC TELEMETRY ROOM

## 2024-07-19 PROCEDURE — 82800 BLOOD PH: CPT

## 2024-07-19 PROCEDURE — 84484 ASSAY OF TROPONIN QUANT: CPT | Performed by: ORTHOPAEDIC SURGERY

## 2024-07-19 PROCEDURE — 83605 ASSAY OF LACTIC ACID: CPT | Performed by: ORTHOPAEDIC SURGERY

## 2024-07-19 PROCEDURE — 83605 ASSAY OF LACTIC ACID: CPT

## 2024-07-19 PROCEDURE — 84132 ASSAY OF SERUM POTASSIUM: CPT

## 2024-07-19 PROCEDURE — 25000003 PHARM REV CODE 250: Performed by: ORTHOPAEDIC SURGERY

## 2024-07-19 PROCEDURE — 80048 BASIC METABOLIC PNL TOTAL CA: CPT | Mod: XB | Performed by: ORTHOPAEDIC SURGERY

## 2024-07-19 PROCEDURE — 83735 ASSAY OF MAGNESIUM: CPT | Performed by: ORTHOPAEDIC SURGERY

## 2024-07-19 PROCEDURE — 99900035 HC TECH TIME PER 15 MIN (STAT)

## 2024-07-19 PROCEDURE — 94660 CPAP INITIATION&MGMT: CPT

## 2024-07-19 PROCEDURE — 93005 ELECTROCARDIOGRAM TRACING: CPT

## 2024-07-19 PROCEDURE — 94761 N-INVAS EAR/PLS OXIMETRY MLT: CPT | Mod: XB

## 2024-07-19 PROCEDURE — 83880 ASSAY OF NATRIURETIC PEPTIDE: CPT | Performed by: ORTHOPAEDIC SURGERY

## 2024-07-19 PROCEDURE — 97530 THERAPEUTIC ACTIVITIES: CPT

## 2024-07-19 PROCEDURE — 97165 OT EVAL LOW COMPLEX 30 MIN: CPT

## 2024-07-19 PROCEDURE — 97535 SELF CARE MNGMENT TRAINING: CPT

## 2024-07-19 PROCEDURE — 25000003 PHARM REV CODE 250: Performed by: NURSE PRACTITIONER

## 2024-07-19 PROCEDURE — 84145 PROCALCITONIN (PCT): CPT | Performed by: ORTHOPAEDIC SURGERY

## 2024-07-19 PROCEDURE — 82803 BLOOD GASES ANY COMBINATION: CPT

## 2024-07-19 PROCEDURE — 27000221 HC OXYGEN, UP TO 24 HOURS

## 2024-07-19 PROCEDURE — 97116 GAIT TRAINING THERAPY: CPT

## 2024-07-19 PROCEDURE — 82330 ASSAY OF CALCIUM: CPT

## 2024-07-19 PROCEDURE — 80053 COMPREHEN METABOLIC PANEL: CPT | Performed by: ORTHOPAEDIC SURGERY

## 2024-07-19 PROCEDURE — 85025 COMPLETE CBC W/AUTO DIFF WBC: CPT | Mod: 91 | Performed by: ORTHOPAEDIC SURGERY

## 2024-07-19 PROCEDURE — 82533 TOTAL CORTISOL: CPT | Performed by: ORTHOPAEDIC SURGERY

## 2024-07-19 PROCEDURE — 84443 ASSAY THYROID STIM HORMONE: CPT | Performed by: ORTHOPAEDIC SURGERY

## 2024-07-19 PROCEDURE — 85014 HEMATOCRIT: CPT

## 2024-07-19 PROCEDURE — 84295 ASSAY OF SERUM SODIUM: CPT

## 2024-07-19 PROCEDURE — 63600175 PHARM REV CODE 636 W HCPCS: Performed by: NURSE PRACTITIONER

## 2024-07-19 RX ORDER — CEFADROXIL 500 MG/1
500 CAPSULE ORAL EVERY 12 HOURS
Status: DISCONTINUED | OUTPATIENT
Start: 2024-07-19 | End: 2024-07-21 | Stop reason: HOSPADM

## 2024-07-19 RX ORDER — CELECOXIB 200 MG/1
200 CAPSULE ORAL DAILY
Status: DISCONTINUED | OUTPATIENT
Start: 2024-07-19 | End: 2024-07-21 | Stop reason: HOSPADM

## 2024-07-19 RX ADMIN — SODIUM CHLORIDE: 9 INJECTION, SOLUTION INTRAVENOUS at 10:07

## 2024-07-19 RX ADMIN — FAMOTIDINE 20 MG: 20 TABLET ORAL at 08:07

## 2024-07-19 RX ADMIN — APIXABAN 5 MG: 5 TABLET, FILM COATED ORAL at 09:07

## 2024-07-19 RX ADMIN — CEFADROXIL 500 MG: 500 CAPSULE ORAL at 08:07

## 2024-07-19 RX ADMIN — ACETAMINOPHEN 1000 MG: 325 TABLET ORAL at 12:07

## 2024-07-19 RX ADMIN — METHOCARBAMOL 750 MG: 750 TABLET ORAL at 09:07

## 2024-07-19 RX ADMIN — APIXABAN 5 MG: 5 TABLET, FILM COATED ORAL at 08:07

## 2024-07-19 RX ADMIN — SODIUM CHLORIDE 1000 ML: 0.9 INJECTION, SOLUTION INTRAVENOUS at 02:07

## 2024-07-19 RX ADMIN — SENNOSIDES AND DOCUSATE SODIUM 1 TABLET: 50; 8.6 TABLET ORAL at 09:07

## 2024-07-19 RX ADMIN — POLYETHYLENE GLYCOL 3350 17 G: 17 POWDER, FOR SOLUTION ORAL at 09:07

## 2024-07-19 RX ADMIN — METOPROLOL SUCCINATE 25 MG: 25 TABLET, EXTENDED RELEASE ORAL at 09:07

## 2024-07-19 RX ADMIN — OXYCODONE HYDROCHLORIDE 10 MG: 10 TABLET ORAL at 06:07

## 2024-07-19 RX ADMIN — FLUOXETINE HYDROCHLORIDE 40 MG: 20 CAPSULE ORAL at 09:07

## 2024-07-19 RX ADMIN — OXYCODONE HYDROCHLORIDE 10 MG: 10 TABLET ORAL at 02:07

## 2024-07-19 RX ADMIN — MUPIROCIN 1 G: 20 OINTMENT TOPICAL at 09:07

## 2024-07-19 RX ADMIN — SENNOSIDES AND DOCUSATE SODIUM 1 TABLET: 50; 8.6 TABLET ORAL at 08:07

## 2024-07-19 RX ADMIN — MUPIROCIN 1 G: 20 OINTMENT TOPICAL at 08:07

## 2024-07-19 RX ADMIN — CEFAZOLIN 2 G: 2 INJECTION, POWDER, FOR SOLUTION INTRAMUSCULAR; INTRAVENOUS at 05:07

## 2024-07-19 RX ADMIN — METHOCARBAMOL 750 MG: 750 TABLET ORAL at 08:07

## 2024-07-19 RX ADMIN — FAMOTIDINE 20 MG: 20 TABLET ORAL at 09:07

## 2024-07-19 RX ADMIN — CELECOXIB 200 MG: 200 CAPSULE ORAL at 09:07

## 2024-07-19 RX ADMIN — OXYCODONE HYDROCHLORIDE 10 MG: 10 TABLET ORAL at 09:07

## 2024-07-19 RX ADMIN — SODIUM CHLORIDE 1000 ML: 0.9 INJECTION, SOLUTION INTRAVENOUS at 12:07

## 2024-07-19 RX ADMIN — METHOCARBAMOL 750 MG: 750 TABLET ORAL at 03:07

## 2024-07-19 RX ADMIN — ACETAMINOPHEN 1000 MG: 325 TABLET ORAL at 05:07

## 2024-07-19 NOTE — PROGRESS NOTES
[facility] - [facility]  Orthopedics  Progress Note    Patient Name: Alivia Marie Cyr  MRN: [id number]  Admission Date: 7/18/2024  Hospital Length of Stay: 1 days  Attending Provider: Theodore Swan MD  Primary Care Provider: Clarisse Richardson MD  Follow-up For: Procedure(s) (LRB):  REVISION, ARTHROPLASTY, KNEE: RIGHT (Right)    Post-Operative Day: 1 Day Post-Op  Subjective:     Principal Problem:Failed total right knee replacement    Principal Orthopedic Problem: same    Interval History: Patient seen and examined at bedside. S/p right revsion TKA on 7/18/24. NAEON. Patient doing well. Pain well controlled.  Patient to mobilize with PT today.      Review of patient's allergies indicates:   Allergen Reactions    Strawberry Anaphylaxis       Current Facility-Administered Medications   Medication    0.9%  NaCl infusion    ceFAZolin 2 g in D5W 50 mL IVPB (MB+)    fentaNYL 50 mcg/mL injection  mcg    midazolam injection 0.5-4 mg    midazolam injection 4 mg    tranexamic acid (CYKLOKAPRON) 1,000 mg in 0.9% NaCl 100 mL IVPB (MB+)    tranexamic acid (CYKLOKAPRON) 1,000 mg in 0.9% NaCl 100 mL IVPB (MB+)    vancomycin 1,500 mg in D5W 250 mL IVPB (Vial-Mate)     Facility-Administered Medications Ordered in Other Encounters   Medication    0.9%  NaCl infusion    0.9%  NaCl infusion    0.9%  NaCl infusion     Objective:     Vital Signs (Most Recent):  Temp: 97.3 °F (36.3 °C) (07/18/24 1229)  Pulse: (!) 56 (07/18/24 1229)  Resp: 18 (07/18/24 1229)  BP: 123/65 (07/18/24 1229)  SpO2: 100 % (07/18/24 1229) Vital Signs (24h Range):  Temp:  [97.3 °F (36.3 °C)] 97.3 °F (36.3 °C)  Pulse:  [56] 56  Resp:  [18] 18  SpO2:  [100 %] 100 %  BP: (123)/(65) 123/65           There is no height or weight on file to calculate BMI.    No intake or output data in the 24 hours ending 07/18/24 1239     Ortho/SPM Exam  AAOx4  NAD  Reg rate  No increased WOB    RLE:  Dressing c/d/i  Polar ice in place  SILT T/SP/DP/Mclean/Sa  Motor intact  T/SP/DP  WWP extremities  FCDs in place and functioning  T scope brace in place       Significant Labs: All pertinent labs within the past 24 hours have been reviewed.    Significant Imaging: I have reviewed all pertinent imaging results/findings.  Assessment/Plan:     * Failed total right knee replacement  Alivia Thomas is a 58 y.o. female is s/p R revision TKA on 7/18/24    Surgical dressing C/D/I, bulky dressing taken down today, ice in place  Pain control: multimodal, Anesthesia Surgical Home following  PT/OT: WBAT RLE   T scope 0-90 degrees   DVT PPx: home eliquis, FCDs at all times when not ambulating  Abx: postop Ancef, then cefadroxil PO x7 days  Drain: none  Kaufman: none    Dispo: plan to dc to home today once cleared by PT/OT            JEREMIAH Alexander MD  Orthopedics  [facility] - Surgery

## 2024-07-19 NOTE — PLAN OF CARE
Problem: Adult Inpatient Plan of Care  Goal: Plan of Care Review  Outcome: Progressing  Goal: Patient-Specific Goal (Individualized)  Outcome: Progressing  Goal: Absence of Hospital-Acquired Illness or Injury  Outcome: Progressing  Intervention: Identify and Manage Fall Risk  Flowsheets (Taken 7/19/2024 0350)  Safety Promotion/Fall Prevention:   assistive device/personal item within reach   instructed to call staff for mobility   side rails raised x 2   nonskid shoes/socks when out of bed  Intervention: Prevent Skin Injury  Flowsheets (Taken 7/19/2024 0350)  Body Position: position changed independently  Device Skin Pressure Protection: adhesive use limited  Intervention: Prevent and Manage VTE (Venous Thromboembolism) Risk  Flowsheets (Taken 7/19/2024 0350)  VTE Prevention/Management:   remove, assess skin, and reapply sequential compression device   ambulation promoted   fluids promoted   intravenous hydration     Problem: Diabetes Comorbidity  Goal: Blood Glucose Level Within Targeted Range  Outcome: Progressing     Problem: Bariatric Environmental Safety  Goal: Safety Maintained with Care  Outcome: Progressing     Problem: Pain Acute  Goal: Optimal Pain Control and Function  Outcome: Progressing   Pt AAOx4. Vital signs as charted. Safety measures in place. Surgical dressing CDI, knee immobilizer in place. PRN medication administered for c/o pain. PNC pump at bedside to manage pain. Neurovascular checks WNL. Up to bedside commode, voiding without difficulty. PIV in place, IVF infusing. Tolerating diet. No signs of distress. Pt resting, no falls or injuries at this time.

## 2024-07-19 NOTE — ANESTHESIA POST-OP PAIN MANAGEMENT
Acute Pain Service Progress Note    Alivia Thomas is a 58 y.o., female, [id number].    Surgery: REVISION, ARTHROPLASTY, KNEE: RIGHT     Post Op Day #: 1    Catheter type: perineural  R Adductor    Infusion type: Ropivacaine 0.2%  7mL q3h IB w/ 5mL q30m DB    Problem List:    Active Hospital Problems    Diagnosis  POA    *Failed total right knee replacement [T84.012A]  Not Applicable      Resolved Hospital Problems   No resolved problems to display.       Subjective:     General appearance of alert, oriented, no complaints   Pain with rest: 4    Numbers   Pain with movement: 6    Numbers   Side Effects    1. Pruritis No    2. Nausea No    3. Motor Blockade No, 0=Ability to raise lower extremities off bed    4. Sedation No, 1=awake and alert    Objective:     Catheter site clean, dry, intact      Vitals   Vitals:    07/19/24 1330   BP: (!) 72/44   Pulse: (!) 50   Resp: (!) 21   Temp:         Labs    Admission on 07/18/2024   Component Date Value Ref Range Status    Body Fluid Type 07/18/2024 Joint Fluid, Right Knee   Final    Fluid Appearance 07/18/2024 Bloody   Final    Fluid Color 07/18/2024 Red   Final    WBC, Body Fluid 07/18/2024 1775  /cu mm Final    Segs, Fluid 07/18/2024 31  % Final    Lymphs, Fluid 07/18/2024 29  % Final    Monocytes/Macrophages, Fluid 07/18/2024 40  % Final    Anaerobic Culture 07/18/2024 Culture in progress   Preliminary    Aerobic Bacterial Culture 07/18/2024 No growth   Preliminary    AFB CULTURE STAIN 07/18/2024 No acid fast bacilli seen.   Final    Fungus (Mycology) Culture 07/18/2024 Culture in progress   Preliminary    AFB CULTURE STAIN 07/18/2024 No acid fast bacilli seen.   Final    Fungus (Mycology) Culture 07/18/2024 Culture in progress   Preliminary    Anaerobic Culture 07/18/2024 Culture in progress   Preliminary    Aerobic Bacterial Culture 07/18/2024 No growth   Preliminary    Anaerobic Culture 07/18/2024 Culture in progress   Preliminary    Aerobic Bacterial Culture  07/18/2024 No growth   Preliminary    AFB CULTURE STAIN 07/18/2024 No acid fast bacilli seen.   Final    Fungus (Mycology) Culture 07/18/2024 Culture in progress   Preliminary    POCT Glucose 07/18/2024 145 (H)  70 - 110 mg/dL Final    Sodium 07/19/2024 141  136 - 145 mmol/L Final    Potassium 07/19/2024 4.5  3.5 - 5.1 mmol/L Final    Chloride 07/19/2024 111 (H)  95 - 110 mmol/L Final    CO2 07/19/2024 23  23 - 29 mmol/L Final    Glucose 07/19/2024 118 (H)  70 - 110 mg/dL Final    BUN 07/19/2024 18  6 - 20 mg/dL Final    Creatinine 07/19/2024 0.8  0.5 - 1.4 mg/dL Final    Calcium 07/19/2024 8.4 (L)  8.7 - 10.5 mg/dL Final    Total Protein 07/19/2024 6.2  6.0 - 8.4 g/dL Final    Albumin 07/19/2024 3.0 (L)  3.5 - 5.2 g/dL Final    Total Bilirubin 07/19/2024 0.5  0.1 - 1.0 mg/dL Final    Alkaline Phosphatase 07/19/2024 53 (L)  55 - 135 U/L Final    AST 07/19/2024 20  10 - 40 U/L Final    ALT 07/19/2024 21  10 - 44 U/L Final    eGFR 07/19/2024 >60.0  >60 mL/min/1.73 m^2 Final    Anion Gap 07/19/2024 7 (L)  8 - 16 mmol/L Final    POCT Glucose 07/19/2024 272 (H)  70 - 110 mg/dL Final    POCT Glucose 07/19/2024 104  70 - 110 mg/dL Final    WBC 07/19/2024 11.36  3.90 - 12.70 K/uL Final    RBC 07/19/2024 3.52 (L)  4.00 - 5.40 M/uL Final    Hemoglobin 07/19/2024 11.3 (L)  12.0 - 16.0 g/dL Final    Hematocrit 07/19/2024 35.3 (L)  37.0 - 48.5 % Final    MCV 07/19/2024 100 (H)  82 - 98 fL Final    MCH 07/19/2024 32.1 (H)  27.0 - 31.0 pg Final    MCHC 07/19/2024 32.0  32.0 - 36.0 g/dL Final    RDW 07/19/2024 15.0 (H)  11.5 - 14.5 % Final    Platelets 07/19/2024 196  150 - 450 K/uL Final    MPV 07/19/2024 10.8  9.2 - 12.9 fL Final    Immature Granulocytes 07/19/2024 0.3  0.0 - 0.5 % Final    Gran # (ANC) 07/19/2024 9.6 (H)  1.8 - 7.7 K/uL Final    Immature Grans (Abs) 07/19/2024 0.03  0.00 - 0.04 K/uL Final    Lymph # 07/19/2024 1.0  1.0 - 4.8 K/uL Final    Mono # 07/19/2024 0.6  0.3 - 1.0 K/uL Final    Eos # 07/19/2024 0.1  0.0 -  0.5 K/uL Final    Baso # 07/19/2024 0.04  0.00 - 0.20 K/uL Final    nRBC 07/19/2024 0  0 /100 WBC Final    Gran % 07/19/2024 84.6 (H)  38.0 - 73.0 % Final    Lymph % 07/19/2024 8.5 (L)  18.0 - 48.0 % Final    Mono % 07/19/2024 5.6  4.0 - 15.0 % Final    Eosinophil % 07/19/2024 0.6  0.0 - 8.0 % Final    Basophil % 07/19/2024 0.4  0.0 - 1.9 % Final    Differential Method 07/19/2024 Automated   Final        Meds   Current Facility-Administered Medications   Medication Dose Route Frequency Provider Last Rate Last Admin    0.9%  NaCl infusion   Intravenous Continuous Seema Weinberg  mL/hr at 07/19/24 1047 New Bag at 07/19/24 1047    acetaminophen tablet 1,000 mg  1,000 mg Oral Q6H Seema Weinberg NP   1,000 mg at 07/19/24 1200    apixaban tablet 5 mg  5 mg Oral BID Harvey Penn MD   5 mg at 07/19/24 0946    bisacodyL suppository 10 mg  10 mg Rectal Q12H PRN Seema Weinberg NP        cefadroxil capsule 500 mg  500 mg Oral Q12H Harvey Penn MD        celecoxib capsule 200 mg  200 mg Oral Daily Seema Weinberg NP   200 mg at 07/19/24 0946    dextrose 10% bolus 125 mL 125 mL  12.5 g Intravenous PRN Harvey Penn MD        dextrose 10% bolus 250 mL 250 mL  25 g Intravenous PRN Harvey Penn MD        famotidine tablet 20 mg  20 mg Oral BID Seema Weinberg NP   20 mg at 07/19/24 0946    FLUoxetine capsule 40 mg  40 mg Oral Daily Harvey Penn MD   40 mg at 07/19/24 0946    glucagon (human recombinant) injection 1 mg  1 mg Intramuscular PRN Harvey Penn MD        glucose chewable tablet 16 g  16 g Oral PRN Harvey Penn MD        glucose chewable tablet 24 g  24 g Oral PRN Harvey Penn MD        insulin aspart U-100 pen 0-10 Units  0-10 Units Subcutaneous QID (AC + HS) PRN Harvey Penn MD        methocarbamoL tablet 750 mg  750 mg Oral TID Seema Weinberg NP   750 mg at 07/19/24 0946    metoprolol succinate (TOPROL-XL) 24 hr tablet 25 mg  25 mg Oral Daily Harvey Penn,  MD   25 mg at 07/19/24 0946    morphine injection 2 mg  2 mg Intravenous Q3H PRN Seema Weinberg, RAFIA        mupirocin 2 % ointment 1 g  1 g Nasal BID Seema Weinberg NP   1 g at 07/19/24 0900    naloxone 0.4 mg/mL injection 0.02 mg  0.02 mg Intravenous PRN Seema Weinberg NP        ondansetron injection 4 mg  4 mg Intravenous Q8H PRN Seema Weinberg NP        oxyCODONE immediate release tablet 5 mg  5 mg Oral Q3H PRN Seema Weinberg NP        oxyCODONE immediate release tablet Tab 10 mg  10 mg Oral Q3H PRN Seema Weinberg, NP   10 mg at 07/19/24 0946    polyethylene glycol packet 17 g  17 g Oral Daily Seema Weinberg NP   17 g at 07/19/24 0900    pregabalin capsule 75 mg  75 mg Oral QHS Seema Weinberg NP   75 mg at 07/18/24 2159    prochlorperazine injection Soln 5 mg  5 mg Intravenous Q6H PRN Seema Weinberg NP        ropivacaine 0.2% Perineural Pump infusion 500 ML   Perineural Continuous Letitia Leal MD   New Bag at 07/18/24 1734    senna-docusate 8.6-50 mg per tablet 1 tablet  1 tablet Oral BID Seema Weinberg NP   1 tablet at 07/19/24 0946    sodium chloride 0.9% bolus 1,000 mL 1,000 mL  1,000 mL Intravenous Once Theodore Swan MD         Facility-Administered Medications Ordered in Other Encounters   Medication Dose Route Frequency Provider Last Rate Last Admin    0.9%  NaCl infusion   Intravenous Continuous Lina Wagner MD 25 mL/hr at 12/15/20 1506 25 mL/hr at 12/15/20 1506    0.9%  NaCl infusion   Intravenous Continuous Ciro Chung MD        0.9%  NaCl infusion   Intravenous Continuous Santos Barron MD            Anticoagulant dose apixaban at 5mg BID.    Assessment:  59 yo F w/ PMHx of Opioid use, Asthma, BMI 41.24, DM2, GERD, HLD, PADDY, AFIB - apixaban, MCA Stroke 1/2024 (no residual deficits), Gout, Gastric Sleeve, History of COVID 2020. Pain adequately controlled and patient doing well. Pt going home with home health    Plan:    1) Continue Adductor PNC  at current infusion rate: Ropivacaine 0.2%  7mL q3h IB w/ 5mL q30m DB  2) Continue multimodals including Tylenol, Celecoxib, Robaxin, Pregabalin  3) Oxy 5mg q3h PRN for moderate pain, Oxy 10mg q3h PRN for severe pain, Morphine 2mg q3h PRN for breakthrough pain not relieved by PO opioids.      Case discussed with staff, Dr. Mahan; final recommendations per attestation above.     Thank you for your consult and allowing us to participate in the care of this patient. We will continue to follow along. Please call the Acute Pain Service/Anesthesia if you have any questions or concerns.    Santos Carter MD  Department of Anesthesiology  [facility]  07/19/2024 2:54 PM

## 2024-07-19 NOTE — PT/OT/SLP EVAL
"Physical Therapy Evaluation- CoEval with OT    Patient Name:  Alivia Thomas   MRN:  [id number]    Recommendations:     Discharge Recommendations: Low Intensity Therapy   Discharge Equipment Recommendations: none   Barriers to discharge: None    Assessment:     Alivia Thomas is a 58 y.o. female admitted with a medical diagnosis of Failed total right knee replacement.  She presents with the following impairments/functional limitations: weakness, impaired endurance, impaired functional mobility, gait instability, decreased lower extremity function, pain, decreased ROM, orthopedic precautions Pt presents to therapy agreeable and motivated to progress with therapy. Pt able to perform bed mobility with SBA, transfers with CGA with RW, and gait with RW with SBA with RLE WBAT. Pt able to tolerate therapy well, however, upon second sit to stand from bed side chair in espinoza pt stated she felt tired, and appeared drowsy, pt assisted back to chair with RW with min assist, nurse notified and BP taken 63/39. Pt brought back to room in bed side chair and nurse present. Pt's main limitations include RLE pain, weakness, and limited ROM. Pt will continue to benefit from acute skilled PT in order to maximize pt functional mobility and ensure safe DC to low intensity therapy post acutely.     Rehab Prognosis: Good; patient would benefit from acute skilled PT services to address these deficits and reach maximum level of function.    Recent Surgery: Procedure(s) (LRB):  REVISION, ARTHROPLASTY, KNEE: RIGHT (Right) 1 Day Post-Op    Plan:     During this hospitalization, patient to be seen 4 x/week to address the identified rehab impairments via gait training, therapeutic activities, neuromuscular re-education, therapeutic exercises and progress toward the following goals:    Plan of Care Expires:  08/19/24    Subjective     Chief Complaint: Pt states "It hurts but I know I need to get up and moving"  Pain/Comfort:  Pain Rating 1: " 9/10  Location - Orientation 1: generalized  Location 1: knee  Pain Rating Post-Intervention 1:  (unable to grade)    Patients cultural, spiritual, Evangelical conflicts given the current situation: no    Living Environment:  Pt reports living in a SSH with spouse, 0 MATTIE, WIS with no chair.  Prior to admission, patients level of function was Independent with gait, ADL's, and driving.  Equipment used at home: none.  DME owned (not currently used): rolling walker.  Upon discharge, patient will have assistance from spouse.    Objective:     Communicated with nurse prior to session.  Patient found HOB elevated with perineural catheter, oxygen  upon PT entry to room.    General Precautions: Standard, fall  Orthopedic Precautions:RLE weight bearing as tolerated   Braces: Hinged knee brace  Respiratory Status: Nasal cannula, flow 2 L/min    Exams:  Sensation:    -       Intact  light/touch BLE  RLE ROM: WFL except Knee extension limited to ~20 degrees, knee flexion limited to 90deg due to HKB   RLE Strength: Unable to formally assess, hip flexion atleast 3/5, knee extension 2+/5, ankle DF/PF WFL  LLE ROM: WFL  LLE Strength: WFL    Functional Mobility:  Bed Mobility:     Supine to Sit: stand by assistance with increased time  Transfers:     Sit to Standx2  Contact guard assistance from EOB with RW  Contact guard assistance from bed side chair with RW, upon standing pt stated she felt drowsy, pt demonstrated LOB to the L requiring min assist to lower pt to chair, pt BP taken 63/39, pt nurse notified.  Gait: pt ambulated 50 feet with SBA with RW with RLE WBAT, pt rested in bedside chair following for safety, pt attempted to perform another gait trial but pt was assisted back to chair as above.  Pt demonstrated decreased step length, decreased toe clearance, flexed trunk, and increased time to initiate LLE swing phase due to pain/apprehension with RLE single limb stance.       AM-PAC 6 CLICK MOBILITY  Total  Score:22       Treatment & Education:  Education: patient educated on POC, need for therapy, safety with mobility, discharge recommendations and equipment recommendations. Patient verbalized understanding of all topics.   Co-evaluation with OT performed due to patient complexity and deficits, requiring two skilled therapists to appropriately and safely assess patient's strength and endurance while facilitating functional tasks in addition to accommodating for patient's activity tolerance.      Patient left up in chair with all lines intact, call button in reach, and nurse present.    GOALS:   Multidisciplinary Problems       Physical Therapy Goals          Problem: Physical Therapy    Goal Priority Disciplines Outcome Goal Variances Interventions   Physical Therapy Goal     PT, PT/OT Progressing     Description: Pt goals till 8/2/24  1. Pt supine to sit with Supervision-not met  2. Pt sit to supine with Supervision-not met  3. Pt sit to stand with Supervision with RW-not met  4. Pt to perform gait 100ft with Supervision with RW with RLE WBAT.-not met  5. Pt to perform B LE exs in sitting or supine x 15 reps to strengthen B LE to improve functional mobility.-not met                           History:     Past Medical History:   Diagnosis Date    Acute right MCA stroke 01/04/2024    Allergy     Anemia     Asthma     Bilateral shoulder bursitis     Cervical stenosis of spine     Chronic pain     DDD (degenerative disc disease), cervical     Depression 01/28/2019    Diabetes mellitus type II     DJD (degenerative joint disease) of knee 06/19/2014    Facet arthritis of lumbar region 12/17/2015    Facet syndrome 12/17/2015    GERD (gastroesophageal reflux disease)     Heartburn     Hyperlipidemia     Hypertension     Morbid obesity     Neuromuscular disorder     PADDY (obstructive sleep apnea)     Paroxysmal atrial fibrillation 03/19/2024    Proteinuria     Right carpal tunnel syndrome     Sacroiliitis 06/13/2018     Sacroiliitis     Sleep apnea     Uterine fibroid        Past Surgical History:   Procedure Laterality Date    CARPAL TUNNEL RELEASE      CARPAL TUNNEL RELEASE  1980s    left    CARPAL TUNNEL RELEASE  2012    right    CATARACT EXTRACTION W/  INTRAOCULAR LENS IMPLANT Left 08/08/2023    DR. STOKES    CATARACT EXTRACTION W/  INTRAOCULAR LENS IMPLANT Right 08/29/2023        COLONOSCOPY N/A 06/15/2020    Procedure: COLONOSCOPY;  Surgeon: Jc Koo MD;  Location: [facility] (4TH FLR);  Service: Endoscopy;  Laterality: N/A;  COVID screening on 6/13/20 at [facility]  pt updated on drop off location and no visitor [facility]    EPIDURAL STEROID INJECTION N/A 07/24/2023    Procedure: INJECTION, STEROID, EPIDURAL, L5/S1 SOONER DATE;  Surgeon: Israel Hurtado MD;  Location: [facility] PAIN MGT;  Service: Pain Management;  Laterality: N/A;    ESOPHAGOGASTRODUODENOSCOPY N/A 03/27/2019    Procedure: EGD (ESOPHAGOGASTRODUODENOSCOPY);  Surgeon: Robbin Bar MD;  Location: [facility] (2ND FLR);  Service: Endoscopy;  Laterality: N/A;  BMI 61.3/2nd floor case/svn    INJECTION OF JOINT Bilateral 06/09/2020    Procedure: INJECTION, JOINT, BILATERAL SI;  Surgeon: Lina Wagner MD;  Location: [facility] PAIN MGT;  Service: Pain Management;  Laterality: Bilateral;  B/L SI Joint Injection    INJECTION OF JOINT Bilateral 05/11/2021    Procedure: INJECTION, JOINT, SACROILIAC (SI) need consent;  Surgeon: Lina Wagner MD;  Location: [facility] PAIN MGT;  Service: Pain Management;  Laterality: Bilateral;    INJECTION OF JOINT Bilateral 5/16/2024    Procedure: INJECTION, JOINT BILATERAL SI AND GTB;  Surgeon: Israel Hurtado MD;  Location: [facility] PAIN MGT;  Service: Pain Management;  Laterality: Bilateral;  [phone]  2 WK F/U BENIJ    JOINT REPLACEMENT Bilateral     with 2 revisions on rt    KNEE SURGERY  03/2010    orthroscope    KNEE SURGERY  06/19/2014    left TKR    LAPAROSCOPIC SLEEVE GASTRECTOMY N/A 08/28/2019    Procedure:  GASTRECTOMY, SLEEVE, LAPAROSCOPIC with intraop EGD;  Surgeon: Heriberto Clements MD;  Location: [facility] OR 2ND FLR;  Service: General;  Laterality: N/A;    PANNICULECTOMY N/A 06/14/2022    Procedure: PANNICULECTOMY;  Surgeon: Rhett Gray MD;  Location: [facility] OR 2ND FLR;  Service: Plastics;  Laterality: N/A;    RADIOFREQUENCY ABLATION Right 07/09/2019    Procedure: RADIOFREQUENCY ABLATION;  Surgeon: Lina Wagner MD;  Location: [facility] PAIN MGT;  Service: Pain Management;  Laterality: Right;  RIGHT RFA L2,3,4,5  2 of 2    RADIOFREQUENCY ABLATION Left 12/19/2019    Procedure: RADIOFREQUENCY ABLATION, LEFT L2-L3-L4-L5 MEDIAL BRANCH 1 OF 2;  Surgeon: Lina Wagner MD;  Location: [facility] PAIN MGT;  Service: Pain Management;  Laterality: Left;    RADIOFREQUENCY ABLATION Right 12/31/2019    Procedure: RADIOFREQUENCY ABLATION, RIGHT L2-L3-L4-L5  2 OF 2;  Surgeon: Lina Wagner MD;  Location: [facility] PAIN MGT;  Service: Pain Management;  Laterality: Right;    RADIOFREQUENCY ABLATION Right 10/13/2020    Procedure: RADIOFREQUENCY ABLATION, Genicular Cooled 1 of 2;  Surgeon: Lina Wagner MD;  Location: [facility] PAIN MGT;  Service: Pain Management;  Laterality: Right;    RADIOFREQUENCY ABLATION Left 11/03/2020    Procedure: RADIOFREQUENCY ABLATION, GENICULAR COOLED  2 OF 2;  Surgeon: Lina Wagner MD;  Location: [facility] PAIN MGT;  Service: Pain Management;  Laterality: Left;    RADIOFREQUENCY ABLATION Left 12/15/2020    Procedure: RADIOFREQUENCY ABLATION LEFT L2,3,4,5 1 of 2;  Surgeon: Lina Wagner MD;  Location: [facility] PAIN MGT;  Service: Pain Management;  Laterality: Left;    RADIOFREQUENCY ABLATION Right 12/29/2020    Procedure: RADIOFREQUENCY ABLATION RIGHT L2,3,4,5 2 of 2;  Surgeon: Lina Wagner MD;  Location: [facility] PAIN MGT;  Service: Pain Management;  Laterality: Right;    RADIOFREQUENCY ABLATION Left 06/29/2021    Procedure: RADIOFREQUENCY ABLATION GENICULAR COOLED;  Surgeon: Lina QUICK  MD Kristy;  Location: [facility] PAIN MGT;  Service: Pain Management;  Laterality: Left;  1 of 2    RADIOFREQUENCY ABLATION Right 07/13/2021    Procedure: RADIOFREQUENCY ABLATION GENICULAR;  Surgeon: Lina Wagner MD;  Location: [facility] PAIN MGT;  Service: Pain Management;  Laterality: Right;  2 of 2    RADIOFREQUENCY ABLATION Right 08/24/2021    Procedure: RADIOFREQUENCY ABLATION, L2-L3-L4-L5 MEDIAL BRANCH 1 OF 2;  Surgeon: Lina Wagner MD;  Location: [facility] PAIN MGT;  Service: Pain Management;  Laterality: Right;    RADIOFREQUENCY ABLATION Left 09/11/2021    Procedure: RADIOFREQUENCY ABLATION, L2-L3-L4-L5 MEDIAL BR ANCH 2 OF 2;  Surgeon: Lina Wagner MD;  Location: [facility] PAIN MGT;  Service: Pain Management;  Laterality: Left;    RADIOFREQUENCY ABLATION Right 03/07/2022    Procedure: RADIOFREQUENCY ABLATION RIGHT L3,4,5 1 of 2, needs consent;  Surgeon: Israel Hurtado MD;  Location: [facility] PAIN MGT;  Service: Pain Management;  Laterality: Right;    RADIOFREQUENCY ABLATION Left 03/21/2022    Procedure: RADIOFREQUENCY ABLATION RIGHT L3,4,5 2 of 2, needs consent;  Surgeon: Israel Hurtado MD;  Location: [facility] PAIN MGT;  Service: Pain Management;  Laterality: Left;    RADIOFREQUENCY ABLATION Right 05/09/2022    Procedure: RADIOFREQUENCY ABLATION RIGHT GENICULAR COOLED 1 of 2;  Surgeon: Israel Hurtado MD;  Location: [facility] PAIN MGT;  Service: Pain Management;  Laterality: Right;    RADIOFREQUENCY ABLATION Left 05/23/2022    Procedure: RADIOFREQUENCY ABLATION LEFT GENICULAR COOLED  2 of 2;  Surgeon: Israel Hurtado MD;  Location: [facility] PAIN MGT;  Service: Pain Management;  Laterality: Left;    RADIOFREQUENCY ABLATION Right 12/12/2022    Procedure: RADIOFREQUENCY ABLATION RIGHT GENICULAR COOLED  ONE OF TWO  NEEDS CONSENT;  Surgeon: Israel Hurtado MD;  Location: [facility] PAIN MGT;  Service: Pain Management;  Laterality: Right;    RADIOFREQUENCY ABLATION Left 01/09/2023    Procedure: RADIOFREQUENCY ABLATION LEFT GENICULAR COOLED  TWO OF  TWO NEEDS CONSENT;  Surgeon: Israel Hurtado MD;  Location: [facility] PAIN MGT;  Service: Pain Management;  Laterality: Left;    RADIOFREQUENCY ABLATION Right 03/06/2023    Procedure: RADIOFREQUENCY ABLATION RIGHT L3,L4,L5;  Surgeon: Israel Hurtado MD;  Location: [facility] PAIN MGT;  Service: Pain Management;  Laterality: Right;    RADIOFREQUENCY ABLATION Left 05/22/2023    Procedure: RADIOFREQUENCY ABLATION LEFT L3,L4,L5;  Surgeon: Israel Hurtado MD;  Location: [facility] PAIN MGT;  Service: Pain Management;  Laterality: Left;  4/3 LM TO R/S    RADIOFREQUENCY ABLATION Right 11/27/2023    Procedure: RADIOFREQUENCY ABLATION RIGHT L3, 4, 5 1 OF 3;  Surgeon: Israel Hurtado MD;  Location: [facility] PAIN MGT;  Service: Pain Management;  Laterality: Right;    RADIOFREQUENCY ABLATION Right 01/22/2024    Procedure: RADIOFREQUENCY ABLATION RIGHT GENICULAR 3 NERVES 3 OF 3;  Surgeon: Israel Hutrado MD;  Location: [facility] PAIN MGT;  Service: Pain Management;  Laterality: Right;    RADIOFREQUENCY ABLATION Left 02/12/2024    Procedure: RADIOFREQUENCY ABLATION LEFT L3, 4, 5;  Surgeon: Israel Hurtado MD;  Location: [facility] PAIN MGT;  Service: Pain Management;  Laterality: Left;  [phone]    RADIOFREQUENCY ABLATION OF LUMBAR MEDIAL BRANCH NERVE AT SINGLE LEVEL Left 06/26/2018    Procedure: RADIOFREQUENCY ABLATION, NERVE, MEDIAL BRANCH, LUMBAR, 1 LEVEL;  Surgeon: Lina Wagner MD;  Location: [facility] PAIN MGT;  Service: Pain Management;  Laterality: Left;  Left Cooled RFA @ L2,3,4,5  [id number]  with Sedation    1 of 2    RADIOFREQUENCY ABLATION OF LUMBAR MEDIAL BRANCH NERVE AT SINGLE LEVEL Right 07/10/2018    Procedure: RADIOFREQUENCY ABLATION, NERVE, MEDIAL BRANCH, LUMBAR, 1 LEVEL;  Surgeon: Lina Wagner MD;  Location: [facility] PAIN MGT;  Service: Pain Management;  Laterality: Right;  RIght Cooled RFA @ L2,3,4,5  [id number] with Sedation    2 of 2    REVISION OF KNEE ARTHROPLASTY Right 7/18/2024    Procedure: REVISION, ARTHROPLASTY, KNEE: RIGHT;  Surgeon:  Theodore Swan MD;  Location: [facility] OR [facility] FLR;  Service: Orthopedics;  Laterality: Right;    TRIGGER FINGER RELEASE Right 2017    TRIGGER FINGER RELEASE Left 07/29/2019    Procedure: RELEASE, TRIGGER FINGER, Left Thumb;  Surgeon: Velma Garcia MD;  Location: [facility];  Service: Orthopedics;  Laterality: Left;  Local w/ MAC       Time Tracking:     PT Received On: 07/19/24  PT Start Time: 0941     PT Stop Time: 1014  PT Total Time (min): 33 min     Billable Minutes: Evaluation 10 and Gait Training 10 Therapeutic Activity 13       07/19/2024

## 2024-07-19 NOTE — CARE UPDATE
RAPID RESPONSE NURSE PROACTIVE ROUNDING NOTE       Time of Visit: 1332    Admit Date: 2024  LOS: 1  Code Status: Prior   Date of Visit: 2024  : 1965  Age: 58 y.o.  Sex: female  Race: Black or   Bed: 502/502 A:   MRN: [id number]  Was the patient discharged from an ICU this admission? No   Was the patient discharged from a PACU within last 24 hours? Yes   Did the patient receive conscious sedation/general anesthesia in last 24 hours? No  Was the patient in the ED within the past 24 hours? No  Was the patient on NIPPV within the past 24 hours? No   Attending Physician: Theodore Swan MD  Primary Service: Orthopedic Surgery   Time spent at the bedside: 15 -30 min    SITUATION    Notified by charge RN during rounding.  Reason for alert: hypotension  Called to evaluate the patient for Circulatory    BACKGROUND     Why is the patient in the hospital?: Failed total right knee replacement    Patient has a past medical history of Acute right MCA stroke, Allergy, Anemia, Asthma, Bilateral shoulder bursitis, Cervical stenosis of spine, Chronic pain, DDD (degenerative disc disease), cervical, Depression, Diabetes mellitus type II, DJD (degenerative joint disease) of knee, Facet arthritis of lumbar region, Facet syndrome, GERD (gastroesophageal reflux disease), Heartburn, Hyperlipidemia, Hypertension, Morbid obesity, Neuromuscular disorder, PADDY (obstructive sleep apnea), Paroxysmal atrial fibrillation, Proteinuria, Right carpal tunnel syndrome, Sacroiliitis, Sacroiliitis, Sleep apnea, and Uterine fibroid.    Last Vitals:  Temp: 97.8 °F (36.6 °C) ( 1607)  Pulse: 61 ( 1630)  Resp: 20 ( 1607)  BP: 93/50 ( 1655)  SpO2: 96 % ( 1607)    24 Hours Vitals Range:  Temp:  [97.4 °F (36.3 °C)-98.7 °F (37.1 °C)]   Pulse:  [50-82]   Resp:  [10-21]   BP: ()/(37-64)   SpO2:  [92 %-100 %]     Labs:  Recent Labs     24  1343   WBC 11.36   HGB 11.3*   HCT 35.3*           Recent Labs     07/19/24  0307      K 4.5   *   CO2 23   BUN 18   CREATININE 0.8   *          ASSESSMENT      Physical Exam  Vitals reviewed.   Eyes:      Pupils: Pupils are equal, round, and reactive to light.   Pulmonary:      Effort: Pulmonary effort is normal.   Abdominal:      General: Abdomen is flat.      Palpations: Abdomen is soft.   Skin:     General: Skin is warm and dry.   Neurological:      Mental Status: She is oriented to person, place, and time.      GCS: GCS eye subscore is 4. GCS verbal subscore is 5. GCS motor subscore is 6.       Patient current manual BP 72/44, HR 50. She is awake and alert sitting up in the chair. She is oriented x3 and following commands. She does report that she does feel a little more sleepy than usual    INTERVENTIONS    The patient was seen for Cardiac problem. Staff concerns included hypotension. The following interventions were performed: continuous cardiac monitoring and NS bolus, CBC, Lactic.    RECOMMENDATIONS    - Complete NS bolus, if patient remains hypotensive please reach back out to rapid response team  - Maintain IV access, continuous telemetry monitoring  - Hold discharge    PROVIDER ESCALATION    Yes/No  Yes    Orders received and case discussed with Dr. Penn .    Disposition: Remain in room 502.    FOLLOW-UP    Charge Mary Jane ARZOLA  updated on plan of care. Instructed to call the Rapid Response Nurse, Slime Smith RN at [phone] for additional questions or concerns.

## 2024-07-19 NOTE — PLAN OF CARE
Problem: Adult Inpatient Plan of Care  Goal: Plan of Care Review  Outcome: Progressing  Goal: Patient-Specific Goal (Individualized)  Outcome: Progressing  Goal: Absence of Hospital-Acquired Illness or Injury  Outcome: Progressing  Goal: Optimal Comfort and Wellbeing  Outcome: Progressing  Goal: Readiness for Transition of Care  Outcome: Progressing     Problem: Diabetes Comorbidity  Goal: Blood Glucose Level Within Targeted Range  Outcome: Progressing     Problem: Bariatric Environmental Safety  Goal: Safety Maintained with Care  Outcome: Progressing     Problem: Pain Acute  Goal: Optimal Pain Control and Function  Outcome: Progressing     Problem: Wound  Goal: Optimal Coping  Outcome: Progressing  Goal: Optimal Functional Ability  Outcome: Progressing  Goal: Absence of Infection Signs and Symptoms  Outcome: Progressing  Goal: Improved Oral Intake  Outcome: Progressing  Goal: Optimal Pain Control and Function  Outcome: Progressing  Goal: Skin Health and Integrity  Outcome: Progressing  Goal: Optimal Wound Healing  Outcome: Progressing

## 2024-07-19 NOTE — PLAN OF CARE
Problem: Physical Therapy  Goal: Physical Therapy Goal  Description: Pt goals till 8/2/24  1. Pt supine to sit with Supervision-not met  2. Pt sit to supine with Supervision-not met  3. Pt sit to stand with Supervision with RW-not met  4. Pt to perform gait 100ft with Supervision with RW with RLE WBAT.-not met  5. Pt to perform B LE exs in sitting or supine x 15 reps to strengthen B LE to improve functional mobility.-not met      Outcome: Progressing

## 2024-07-19 NOTE — PLAN OF CARE
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]    Admission Date: 7/18/2024  Expected Discharge Date: 7/19/2024         Payor: MEDICARE / Plan: MEDICARE PART A & B / Product Type: Government /     Extended Emergency Contact Information  Primary Emergency Contact: Ba Sanders  Address: [address] [place] [address] [place]  Mobile Phone: [phone]  Relation: Spouse  Preferred language: English  Secondary Emergency Contact: Memo Mueller  Address: [address] [place] [address] [facility]  Home Phone: [phone]  Mobile Phone: [phone]  Relation: Sister  Preferred language: English    Discharge Plan A: Home with family, Home Health         [facility] - [place] - [address]  [address]  [place] [address]  Phone: [phone] Fax: [phone]    [facility] [address] [place] [address]  Phone: [phone] Fax: [phone]      Initial Assessment (most recent)       Adult Discharge Assessment - 07/19/24 1037          Discharge Assessment    Assessment Type Discharge Planning Assessment     Confirmed/corrected address, phone number and insurance Yes     Confirmed Demographics Correct on Facesheet     Source of Information patient;family     Does patient/caregiver understand observation status Yes     Communicated RYAN with patient/caregiver Yes     People in Home spouse     Do you expect to return to your current living situation? Yes     Do you have help at home or someone to help you manage your care at home? Yes     Who are your caregiver(s) and their phone number(s)? Spouse-Ba ([phone])     Prior to hospitilization cognitive status: Alert/Oriented     Current  cognitive status: Alert/Oriented     Walking or Climbing Stairs Difficulty no     Dressing/Bathing Difficulty no     Equipment Currently Used at Home none     Readmission within 30 days? No     Patient currently being followed by outpatient case management? No     Do you currently have service(s) that help you manage your care at home? No     Do you take prescription medications? No     Do you have prescription coverage? Yes     Do you have any problems affording any of your prescribed medications? No     Is the patient taking medications as prescribed? yes     Who is going to help you get home at discharge? Spouse-Ba     How do you get to doctors appointments? family or friend will provide     Are you on dialysis? No     Do you take coumadin? No     Discharge Plan A Home with family;Home Health        Physical Activity    On average, how many days per week do you engage in moderate to strenuous exercise (like a brisk walk)? 0 days     On average, how many minutes do you engage in exercise at this level? 0 min        Financial Resource Strain    How hard is it for you to pay for the very basics like food, housing, medical care, and heating? Not very hard        Housing Stability    In the last 12 months, was there a time when you were not able to pay the mortgage or rent on time? No     At any time in the past 12 months, were you homeless or living in a shelter (including now)? No        Transportation Needs    Has the lack of transportation kept you from medical appointments, meetings, work or from getting things needed for daily living? No        Food Insecurity    Within the past 12 months, you worried that your food would run out before you got the money to buy more. Never true     Within the past 12 months, the food you bought just didn't last and you didn't have money to get more. Never true        Stress    Do you feel stress - tense, restless, nervous, or anxious, or unable to sleep at night because  your mind is troubled all the time - these days? Not at all        Social Isolation    How often do you feel lonely or isolated from those around you?  Never        Alcohol Use    Q1: How often do you have a drink containing alcohol? Never     Q2: How many drinks containing alcohol do you have on a typical day when you are drinking? Patient does not drink     Q3: How often do you have six or more drinks on one occasion? Never        Utilities    In the past 12 months has the electric, gas, oil, or water company threatened to shut off services in your home? No        Health Literacy    How often do you need to have someone help you when you read instructions, pamphlets, or other written material from your doctor or pharmacy? Never                   Spoke with patient and spouse at bedside to complete d/c planning assessment. Patient lives with her wife in a single story home with one step to enter. She is Independent with ADL's. No DME in home. Verified PCP, Pharmacy and health insurance. PT/OT eval completed and recommending low intensity d/c needs, rolling walker and bedside commode. [facility] has orders and will deliver to bedside today. Care port referral system currently down. Call placed to Adeola with [facility] to arrange services for patient following d/c. Patient to d/c late this afternoon versus tomorrow am depending on Blood Pressure. Will continue to follow. Discharge Plan A and Plan B have been determined by review of patient's clinical status, future medical and therapeutic needs, and coverage/benefits for post-acute care in coordination with multidisciplinary team members.      Marilin Knowles RNCM  Case Management  [facility]  [phone]

## 2024-07-19 NOTE — NURSING
Nurses Note -- 4 Eyes      7/18/2024   8:02 PM      Skin assessed during: Admit      [x] No Altered Skin Integrity Present    []Prevention Measures Documented      [] Yes- Altered Skin Integrity Present or Discovered   [] LDA Added if Not in Epic (Describe Wound)   [] New Altered Skin Integrity was Present on Admit and Documented in LDA   [] Wound Image Taken    Wound Care Consulted? No    Attending Nurse:  Nya Saavedra RN/Staff Member:   Yesol

## 2024-07-19 NOTE — PLAN OF CARE
Problem: Occupational Therapy  Goal: Occupational Therapy Goal  Description: Goals to be met by: 8/18/24     Patient will increase functional independence with ADLs by performing:    UE Dressing with Supervision.  LE Dressing with Stand-by Assistance.  Grooming while standing at sink with Supervision.  Toileting from toilet with Supervision for hygiene and clothing management.   All functional transfers performed with SBA    Outcome: Progressing

## 2024-07-19 NOTE — CARE UPDATE
RAPID RESPONSE NURSE FOLLOW-UP NOTE       Followed up with patient for proactive rounding.  Patient remains hypotensive, currently MAP 59, she is receiving another 1L bolus. She is assisted back to bed, she remains awake and alert, but still reports sleepiness. After assisting patient back to bed BP 95/54 (69). Plan to recheck BP after completion of bolus. Reviewed plan of care with  bedside nurse, Kanchan .   Team will continue to follow.  Please call Rapid Response RN, Slime Smith RN with any questions or concerns at [phone].

## 2024-07-19 NOTE — ANESTHESIA POSTPROCEDURE EVALUATION
Anesthesia Post Evaluation    Patient: Alivia Thomas    Procedure(s) Performed: Procedure(s) (LRB):  REVISION, ARTHROPLASTY, KNEE: RIGHT (Right)    Final Anesthesia Type: general      Patient location during evaluation: PACU  Patient participation: Yes- Able to Participate  Level of consciousness: awake and alert  Post-procedure vital signs: reviewed and stable  Pain management: adequate  Airway patency: patent    PONV status at discharge: No PONV  Anesthetic complications: no      Cardiovascular status: blood pressure returned to baseline  Respiratory status: unassisted  Follow-up not needed.              Event Time   Out of Recovery 18:30:00         Pain/Aracely Score: Pain Rating Prior to Med Admin: 5 (7/19/2024 11:01 AM)  Pain Rating Post Med Admin: 2 (7/19/2024  1:00 PM)  Aracely Score: 9 (7/18/2024  6:30 PM)

## 2024-07-19 NOTE — PT/OT/SLP EVAL
Occupational Therapy   Evaluation    Name: Alivia Thomas  MRN: [id number]  Admitting Diagnosis: Failed total right knee replacement  Recent Surgery: Procedure(s) (LRB):  REVISION, ARTHROPLASTY, KNEE: RIGHT (Right) 1 Day Post-Op    Recommendations:     Discharge Recommendations: Low Intensity Therapy  Discharge Equipment Recommendations:  bedside commode  Barriers to discharge:  None  Patient has a mobility limitation that significantly impairs their ability to participate in one or more mobility related activities of daily living, including toileting. This deficit can be resolved by using a bedside commode. Patient demonstrates mobility limitations that will cause them to be confined to one room at home without bathroom access for up to 30 days. Using a bedside commode will greatly improve the patient's ability to participate in MRADLs.     Assessment:   CO-TX with PT for pt safety and max participation with both disciplines.    Alivia Thomas is a 58 y.o. female with a medical diagnosis of Failed total right knee replacement.  She presents with R knee pain and WB precautions. Performance deficits affecting function: weakness, impaired endurance, impaired functional mobility, gait instability, decreased lower extremity function, pain, decreased ROM, orthopedic precautions, decreased safety awareness.  During ambulatory assessment in [place], pt stated she felt tired and needed to sit, and upon checking pt orthostatics, pt had significant drop in BP to 63/39 requiring to t/f pt to chair and wheel her back to bedside where pt was handed off to nurse. PTA, pt reports being Indp. Upon eval, pt was pushing through pain but limited by drop in BP.    Rehab Prognosis: Good; patient would benefit from acute skilled OT services to address these deficits and reach maximum level of function.       Plan:     Patient to be seen 3 x/week to address the above listed problems via self-care/home management, therapeutic activities,  "therapeutic exercises  Plan of Care Expires: 08/18/24  Plan of Care Reviewed with: patient    Subjective     Chief Complaint: " I'm ready to get started"  Patient/Family Comments/goals: return home    Occupational Profile:  Living Environment: Lives with spouse, in SSH, 0 MATTIE and no hand rail, WIS in bathroom  Previous level of function: Indp  Roles and Routines: spouse  Equipment Used at Home: walker, rolling  Assistance upon Discharge: spouse    Pain/Comfort:  Pain Rating 1: 9/10  Location - Side 1: Right  Location - Orientation 1: generalized  Location 1: knee  Pain Addressed 1: Reposition, Distraction, Cessation of Activity    Patients cultural, spiritual, Restoration conflicts given the current situation: no    Objective:     Communicated with: Nsg prior to session.  Patient found HOB elevated with perineural catheter, oxygen upon OT entry to room.    General Precautions: Standard, fall  Orthopedic Precautions: RLE weight bearing as tolerated  Braces: Hinged knee brace  Respiratory Status: Nasal cannula, flow 2 L/min    Occupational Performance:    Bed Mobility:    Patient completed Supine to Sit with stand by assistance    Functional Mobility/Transfers:  Patient completed Sit <> Stand Transfer with contact guard assistance  with  rolling walker   Chair t/f c/ SBA to CGA using RW  Functional Mobility: Pt ambulated both room and hallway level c/ SBA using RW to simulate safe home and community mobility, and visual scanning needed for ADL and IADL participation     Activities of Daily Living:  Feeding:  stand by assistance drinking water  Grooming: supervision facial H using wash cloth while seated  Upper Body Dressing: contact guard assistance don gown for backside  Lower Body Dressing: maximal assistance doff/on FCD    Cognitive/Visual Perceptual:  A&O x4    Physical Exam:  BUE WNL  Balance: intact    AMPAC 6 Click ADL:  AMPAC Total Score: 21    Treatment & Education:  Pt educated on role and purpose of " therapy  Pt educated on goal setting  Pt educated on benefits of OOB activity  Pt educated on self advocacy  Pt educated on WB precautions    Patient left up in chair with all lines intact, call button in reach, and nsg notified    GOALS:   Multidisciplinary Problems       Occupational Therapy Goals          Problem: Occupational Therapy    Goal Priority Disciplines Outcome Interventions   Occupational Therapy Goal     OT, PT/OT Progressing    Description: Goals to be met by: 8/18/24     Patient will increase functional independence with ADLs by performing:    UE Dressing with Supervision.  LE Dressing with Stand-by Assistance.  Grooming while standing at sink with Supervision.  Toileting from toilet with Supervision for hygiene and clothing management.   All functional transfers performed with SBA                         History:     Past Medical History:   Diagnosis Date    Acute right MCA stroke 01/04/2024    Allergy     Anemia     Asthma     Bilateral shoulder bursitis     Cervical stenosis of spine     Chronic pain     DDD (degenerative disc disease), cervical     Depression 01/28/2019    Diabetes mellitus type II     DJD (degenerative joint disease) of knee 06/19/2014    Facet arthritis of lumbar region 12/17/2015    Facet syndrome 12/17/2015    GERD (gastroesophageal reflux disease)     Heartburn     Hyperlipidemia     Hypertension     Morbid obesity     Neuromuscular disorder     PADDY (obstructive sleep apnea)     Paroxysmal atrial fibrillation 03/19/2024    Proteinuria     Right carpal tunnel syndrome     Sacroiliitis 06/13/2018    Sacroiliitis     Sleep apnea     Uterine fibroid          Past Surgical History:   Procedure Laterality Date    CARPAL TUNNEL RELEASE      CARPAL TUNNEL RELEASE  1980s    left    CARPAL TUNNEL RELEASE  2012    right    CATARACT EXTRACTION W/  INTRAOCULAR LENS IMPLANT Left 08/08/2023    DR. STOKES    CATARACT EXTRACTION W/  INTRAOCULAR LENS IMPLANT Right 08/29/2023         COLONOSCOPY N/A 06/15/2020    Procedure: COLONOSCOPY;  Surgeon: Jc Koo MD;  Location: [facility] (4TH FLR);  Service: Endoscopy;  Laterality: N/A;  COVID screening on 6/13/20 at [facility]  pt updated on drop off location and no visitor policy-    EPIDURAL STEROID INJECTION N/A 07/24/2023    Procedure: INJECTION, STEROID, EPIDURAL, L5/S1 SOONER DATE;  Surgeon: Israel Hurtado MD;  Location: [facility] PAIN MGT;  Service: Pain Management;  Laterality: N/A;    ESOPHAGOGASTRODUODENOSCOPY N/A 03/27/2019    Procedure: EGD (ESOPHAGOGASTRODUODENOSCOPY);  Surgeon: Robbin Bar MD;  Location: [facility] (2ND FLR);  Service: Endoscopy;  Laterality: N/A;  BMI 61.3/2nd floor case/svn    INJECTION OF JOINT Bilateral 06/09/2020    Procedure: INJECTION, JOINT, BILATERAL SI;  Surgeon: Lina Wagner MD;  Location: [facility] PAIN T;  Service: Pain Management;  Laterality: Bilateral;  B/L SI Joint Injection    INJECTION OF JOINT Bilateral 05/11/2021    Procedure: INJECTION, JOINT, SACROILIAC (SI) need consent;  Surgeon: Lina Wagner MD;  Location: [facility] PAIN MGT;  Service: Pain Management;  Laterality: Bilateral;    INJECTION OF JOINT Bilateral 5/16/2024    Procedure: INJECTION, JOINT BILATERAL SI AND GTB;  Surgeon: Israel Hurtado MD;  Location: [facility] [place] MGT;  Service: Pain Management;  Laterality: Bilateral;  [phone]  2 WK F/U BENJI    JOINT REPLACEMENT Bilateral     with 2 revisions on rt    KNEE SURGERY  03/2010    orthroscope    KNEE SURGERY  06/19/2014    left TKR    LAPAROSCOPIC SLEEVE GASTRECTOMY N/A 08/28/2019    Procedure: GASTRECTOMY, SLEEVE, LAPAROSCOPIC with intraop EGD;  Surgeon: Heriberto Clements MD;  Location: [facility] OR 2ND FLR;  Service: General;  Laterality: N/A;    PANNICULECTOMY N/A 06/14/2022    Procedure: PANNICULECTOMY;  Surgeon: Rhett Gray MD;  [facility];  Service: Plastics;  Laterality: N/A;    RADIOFREQUENCY ABLATION Right 07/09/2019     Procedure: RADIOFREQUENCY ABLATION;  Surgeon: Lina Wagner MD;  Location: [facility] PAIN MGT;  Service: Pain Management;  Laterality: Right;  RIGHT RFA L2,3,4,5  2 of 2    RADIOFREQUENCY ABLATION Left 12/19/2019    Procedure: RADIOFREQUENCY ABLATION, LEFT L2-L3-L4-L5 MEDIAL BRANCH 1 OF 2;  Surgeon: Lina Wagner MD;  Location: [facility] PAIN MGT;  Service: Pain Management;  Laterality: Left;    RADIOFREQUENCY ABLATION Right 12/31/2019    Procedure: RADIOFREQUENCY ABLATION, RIGHT L2-L3-L4-L5  2 OF 2;  Surgeon: Lina Wagner MD;  Location: [facility] MGT;  Service: Pain Management;  Laterality: Right;    RADIOFREQUENCY ABLATION Right 10/13/2020    Procedure: RADIOFREQUENCY ABLATION, Genicular Cooled 1 of 2;  Surgeon: Lina Wagner MD;  Location: [facility]IN MGT;  Service: Pain Management;  Laterality: Right;    RADIOFREQUENCY ABLATION Left 11/03/2020    Procedure: RADIOFREQUENCY ABLATION, GENICULAR COOLED  2 OF 2;  Surgeon: Lina Wagner MD;  Location: [facility] PAIN MGT;  Service: Pain Management;  Laterality: Left;    RADIOFREQUENCY ABLATION Left 12/15/2020    Procedure: RADIOFREQUENCY ABLATION LEFT L2,3,4,5 1 of 2;  Surgeon: Lina Wagner MD;  Location: [facility] PAIN MGT;  Service: Pain Management;  Laterality: Left;    RADIOFREQUENCY ABLATION Right 12/29/2020    Procedure: RADIOFREQUENCY ABLATION RIGHT L2,3,4,5 2 of 2;  Surgeon: Lina Wagner MD;  Location: [facility] PAIN MGT;  Service: Pain Management;  Laterality: Right;    RADIOFREQUENCY ABLATION Left 06/29/2021    Procedure: RADIOFREQUENCY ABLATION GENICULAR COOLED;  Surgeon: Lina Wagner MD;  Location: [facility] PAIN MGT;  Service: Pain Management;  Laterality: Left;  1 of 2    RADIOFREQUENCY ABLATION Right 07/13/2021    Procedure: RADIOFREQUENCY ABLATION GENICULAR;  Surgeon: Lina Wagner MD;  Location: [facility] PAIN MGT;  Service: Pain Management;  Laterality: Right;  2 of 2    RADIOFREQUENCY ABLATION Right 08/24/2021    Procedure: RADIOFREQUENCY  ABLATION, L2-L3-L4-L5 MEDIAL BRANCH 1 OF 2;  Surgeon: Lina Wagner MD;  Location: [facility] PAIN MGT;  Service: Pain Management;  Laterality: Right;    RADIOFREQUENCY ABLATION Left 09/11/2021    Procedure: RADIOFREQUENCY ABLATION, L2-L3-L4-L5 MEDIAL BR ANCH 2 OF 2;  Surgeon: Lina Wagner MD;  Location: [facility] PAIN MGT;  Service: Pain Management;  Laterality: Left;    RADIOFREQUENCY ABLATION Right 03/07/2022    Procedure: RADIOFREQUENCY ABLATION RIGHT L3,4,5 1 of 2, needs consent;  Surgeon: Israel Hurtado MD;  Location: [facility] PAIN MGT;  Service: Pain Management;  Laterality: Right;    RADIOFREQUENCY ABLATION Left 03/21/2022    Procedure: RADIOFREQUENCY ABLATION RIGHT L3,4,5 2 of 2, needs consent;  Surgeon: Israel Hurtado MD;  Location: [facility] PAIN MGT;  Service: Pain Management;  Laterality: Left;    RADIOFREQUENCY ABLATION Right 05/09/2022    Procedure: RADIOFREQUENCY ABLATION RIGHT GENICULAR COOLED 1 of 2;  Surgeon: Israel Hurtado MD;  Location: [facility] PAIN MGT;  Service: Pain Management;  Laterality: Right;    RADIOFREQUENCY ABLATION Left 05/23/2022    Procedure: RADIOFREQUENCY ABLATION LEFT GENICULAR COOLED  2 of 2;  Surgeon: Israel Hurtado MD;  Location: [facility] PAIN MGT;  Service: Pain Management;  Laterality: Left;    RADIOFREQUENCY ABLATION Right 12/12/2022    Procedure: RADIOFREQUENCY ABLATION RIGHT GENICULAR COOLED  ONE OF TWO  NEEDS CONSENT;  Surgeon: Israel Hurtado MD;  Location: [facility] PAIN MGT;  Service: Pain Management;  Laterality: Right;    RADIOFREQUENCY ABLATION Left 01/09/2023    Procedure: RADIOFREQUENCY ABLATION LEFT GENICULAR COOLED  TWO OF TWO NEEDS CONSENT;  Surgeon: Israel Hurtado MD;  Location: [facility] PAIN MGT;  Service: Pain Management;  Laterality: Left;    RADIOFREQUENCY ABLATION Right 03/06/2023    Procedure: RADIOFREQUENCY ABLATION RIGHT L3,L4,L5;  Surgeon: Israel Hurtado MD;  Location: [facility] PAIN MGT;  Service: Pain Management;  Laterality: Right;    RADIOFREQUENCY ABLATION Left 05/22/2023    Procedure:  RADIOFREQUENCY ABLATION LEFT L3,L4,L5;  Surgeon: Israel Hurtado MD;  Location: [facility] PAIN MGT;  Service: Pain Management;  Laterality: Left;  4/3 LM TO R/S    RADIOFREQUENCY ABLATION Right 11/27/2023    Procedure: RADIOFREQUENCY ABLATION RIGHT L3, 4, 5 1 OF 3;  Surgeon: Israel Hurtado MD;  Location: [facility] PAIN MGT;  Service: Pain Management;  Laterality: Right;    RADIOFREQUENCY ABLATION Right 01/22/2024    Procedure: RADIOFREQUENCY ABLATION RIGHT GENICULAR 3 NERVES 3 OF 3;  Surgeon: Israel Hurtado MD;  Location: [facility] PAIN MGT;  Service: Pain Management;  Laterality: Right;    RADIOFREQUENCY ABLATION Left 02/12/2024    Procedure: RADIOFREQUENCY ABLATION LEFT L3, 4, 5;  Surgeon: Israel Hurtado MD;  Location: [facility] PAIN MGT;  Service: Pain Management;  Laterality: Left;  [phone]    RADIOFREQUENCY ABLATION OF LUMBAR MEDIAL BRANCH NERVE AT SINGLE LEVEL Left 06/26/2018    Procedure: RADIOFREQUENCY ABLATION, NERVE, MEDIAL BRANCH, LUMBAR, 1 LEVEL;  Surgeon: Lina Wagner MD;  Location: [facility] PAIN MGT;  Service: Pain Management;  Laterality: Left;  Left Cooled RFA @ L2,3,4,5  [id number]  with Sedation    1 of 2    RADIOFREQUENCY ABLATION OF LUMBAR MEDIAL BRANCH NERVE AT SINGLE LEVEL Right 07/10/2018    Procedure: RADIOFREQUENCY ABLATION, NERVE, MEDIAL BRANCH, LUMBAR, 1 LEVEL;  Surgeon: Lina Wagner MD;  Location: [facility] PAIN MGT;  Service: Pain Management;  Laterality: Right;  RIght Cooled RFA @ L2,3,4,5  [id number] with Sedation    2 of 2    REVISION OF KNEE ARTHROPLASTY Right 7/18/2024    Procedure: REVISION, ARTHROPLASTY, KNEE: RIGHT;  Surgeon: Theodore Swan MD;  Location: [facility] OR 2ND FLR;  Service: Orthopedics;  Laterality: Right;    TRIGGER FINGER RELEASE Right 2017    TRIGGER FINGER RELEASE Left 07/29/2019    Procedure: RELEASE, TRIGGER FINGER, Left Thumb;  Surgeon: Velma Garcia MD;  Location: [facility] OR;  Service: Orthopedics;  Laterality: Left;  Local w/ MAC       Time Tracking:     OT Date of  Treatment: 07/19/24  OT Start Time: 0942  OT Stop Time: 1014  OT Total Time (min): 32 min    Billable Minutes:Evaluation 8  Self Care/Home Management 24 7/19/2024

## 2024-07-19 NOTE — RESPIRATORY THERAPY
RAPID RESPONSE RESPIRATORY CHART CHECK       Chart check completed. Please call [phone] for further concerns or assistance.

## 2024-07-19 NOTE — PLAN OF CARE
07/19/24 1048   Post-Acute Status   Post-Acute Authorization Home Health   Home Health Status Referrals Sent   Discharge Plan   Discharge Plan A Home with family;Home Health     Referral sent to [facility] for post-acute needs.    Marilin BRAUN  Case Management  [facility]  [phone]

## 2024-07-20 PROBLEM — I95.81 POSTPROCEDURAL HYPOTENSION: Status: ACTIVE | Noted: 2024-07-20

## 2024-07-20 LAB
ALBUMIN SERPL BCP-MCNC: 2.8 G/DL (ref 3.5–5.2)
ALLENS TEST: ABNORMAL
ALP SERPL-CCNC: 57 U/L (ref 55–135)
ALT SERPL W/O P-5'-P-CCNC: 11 U/L (ref 10–44)
ANION GAP SERPL CALC-SCNC: 5 MMOL/L (ref 8–16)
AST SERPL-CCNC: 14 U/L (ref 10–40)
BASOPHILS # BLD AUTO: 0.02 K/UL (ref 0–0.2)
BASOPHILS # BLD AUTO: 0.03 K/UL (ref 0–0.2)
BASOPHILS NFR BLD: 0.3 % (ref 0–1.9)
BASOPHILS NFR BLD: 0.4 % (ref 0–1.9)
BILIRUB SERPL-MCNC: 0.8 MG/DL (ref 0.1–1)
BUN SERPL-MCNC: 20 MG/DL (ref 6–20)
CALCIUM SERPL-MCNC: 8.3 MG/DL (ref 8.7–10.5)
CHLORIDE SERPL-SCNC: 113 MMOL/L (ref 95–110)
CO2 SERPL-SCNC: 23 MMOL/L (ref 23–29)
CORTIS SERPL-MCNC: 7.6 UG/DL (ref 4.3–22.4)
CORTIS SERPL-MCNC: 8.2 UG/DL
CREAT SERPL-MCNC: 0.8 MG/DL (ref 0.5–1.4)
DIFFERENTIAL METHOD BLD: ABNORMAL
DIFFERENTIAL METHOD BLD: ABNORMAL
EOSINOPHIL # BLD AUTO: 0.1 K/UL (ref 0–0.5)
EOSINOPHIL # BLD AUTO: 0.2 K/UL (ref 0–0.5)
EOSINOPHIL NFR BLD: 2 % (ref 0–8)
EOSINOPHIL NFR BLD: 3 % (ref 0–8)
ERYTHROCYTE [DISTWIDTH] IN BLOOD BY AUTOMATED COUNT: 15 % (ref 11.5–14.5)
ERYTHROCYTE [DISTWIDTH] IN BLOOD BY AUTOMATED COUNT: 15 % (ref 11.5–14.5)
EST. GFR  (NO RACE VARIABLE): >60 ML/MIN/1.73 M^2
GLUCOSE SERPL-MCNC: 89 MG/DL (ref 70–110)
HCO3 UR-SCNC: 22.4 MMOL/L (ref 24–28)
HCT VFR BLD AUTO: 33 % (ref 37–48.5)
HCT VFR BLD AUTO: 33.5 % (ref 37–48.5)
HGB BLD-MCNC: 10.1 G/DL (ref 12–16)
HGB BLD-MCNC: 10.5 G/DL (ref 12–16)
IMM GRANULOCYTES # BLD AUTO: 0.03 K/UL (ref 0–0.04)
IMM GRANULOCYTES # BLD AUTO: 0.03 K/UL (ref 0–0.04)
IMM GRANULOCYTES NFR BLD AUTO: 0.4 % (ref 0–0.5)
IMM GRANULOCYTES NFR BLD AUTO: 0.4 % (ref 0–0.5)
LACTATE SERPL-SCNC: 0.9 MMOL/L (ref 0.5–2.2)
LACTATE SERPL-SCNC: 0.9 MMOL/L (ref 0.5–2.2)
LYMPHOCYTES # BLD AUTO: 1 K/UL (ref 1–4.8)
LYMPHOCYTES # BLD AUTO: 1.2 K/UL (ref 1–4.8)
LYMPHOCYTES NFR BLD: 13.9 % (ref 18–48)
LYMPHOCYTES NFR BLD: 15.6 % (ref 18–48)
MAGNESIUM SERPL-MCNC: 1.9 MG/DL (ref 1.6–2.6)
MCH RBC QN AUTO: 31.1 PG (ref 27–31)
MCH RBC QN AUTO: 31.6 PG (ref 27–31)
MCHC RBC AUTO-ENTMCNC: 30.6 G/DL (ref 32–36)
MCHC RBC AUTO-ENTMCNC: 31.3 G/DL (ref 32–36)
MCV RBC AUTO: 101 FL (ref 82–98)
MCV RBC AUTO: 102 FL (ref 82–98)
MONOCYTES # BLD AUTO: 0.4 K/UL (ref 0.3–1)
MONOCYTES # BLD AUTO: 0.7 K/UL (ref 0.3–1)
MONOCYTES NFR BLD: 5.9 % (ref 4–15)
MONOCYTES NFR BLD: 8.7 % (ref 4–15)
NEUTROPHILS # BLD AUTO: 5.3 K/UL (ref 1.8–7.7)
NEUTROPHILS # BLD AUTO: 5.5 K/UL (ref 1.8–7.7)
NEUTROPHILS NFR BLD: 71.9 % (ref 38–73)
NEUTROPHILS NFR BLD: 77.5 % (ref 38–73)
NRBC BLD-RTO: 0 /100 WBC
NRBC BLD-RTO: 0 /100 WBC
OHS QRS DURATION: 76 MS
OHS QTC CALCULATION: 398 MS
PCO2 BLDA: 49.5 MMHG (ref 35–45)
PH SMN: 7.26 [PH] (ref 7.35–7.45)
PHOSPHATE SERPL-MCNC: 2.9 MG/DL (ref 2.7–4.5)
PLATELET # BLD AUTO: 165 K/UL (ref 150–450)
PLATELET # BLD AUTO: 171 K/UL (ref 150–450)
PMV BLD AUTO: 11.5 FL (ref 9.2–12.9)
PMV BLD AUTO: 11.5 FL (ref 9.2–12.9)
PO2 BLDA: 33 MMHG (ref 40–60)
POC BE: -5 MMOL/L
POC SATURATED O2: 54 % (ref 95–100)
POC TCO2: 24 MMOL/L (ref 24–29)
POCT GLUCOSE: 227 MG/DL (ref 70–110)
POCT GLUCOSE: 92 MG/DL (ref 70–110)
POCT GLUCOSE: 94 MG/DL (ref 70–110)
POCT GLUCOSE: 96 MG/DL (ref 70–110)
POTASSIUM SERPL-SCNC: 4.5 MMOL/L (ref 3.5–5.1)
PROCALCITONIN SERPL IA-MCNC: 0.12 NG/ML
PROT SERPL-MCNC: 5.9 G/DL (ref 6–8.4)
RBC # BLD AUTO: 3.25 M/UL (ref 4–5.4)
RBC # BLD AUTO: 3.32 M/UL (ref 4–5.4)
SAMPLE: ABNORMAL
SITE: ABNORMAL
SODIUM SERPL-SCNC: 141 MMOL/L (ref 136–145)
TROPONIN I SERPL DL<=0.01 NG/ML-MCNC: <0.006 NG/ML (ref 0–0.03)
TSH SERPL DL<=0.005 MIU/L-ACNC: 0.82 UIU/ML (ref 0.4–4)
WBC # BLD AUTO: 7.12 K/UL (ref 3.9–12.7)
WBC # BLD AUTO: 7.43 K/UL (ref 3.9–12.7)

## 2024-07-20 PROCEDURE — 36415 COLL VENOUS BLD VENIPUNCTURE: CPT | Performed by: STUDENT IN AN ORGANIZED HEALTH CARE EDUCATION/TRAINING PROGRAM

## 2024-07-20 PROCEDURE — 82533 TOTAL CORTISOL: CPT | Performed by: STUDENT IN AN ORGANIZED HEALTH CARE EDUCATION/TRAINING PROGRAM

## 2024-07-20 PROCEDURE — 85025 COMPLETE CBC W/AUTO DIFF WBC: CPT | Performed by: ORTHOPAEDIC SURGERY

## 2024-07-20 PROCEDURE — 25000003 PHARM REV CODE 250: Performed by: STUDENT IN AN ORGANIZED HEALTH CARE EDUCATION/TRAINING PROGRAM

## 2024-07-20 PROCEDURE — 97110 THERAPEUTIC EXERCISES: CPT | Mod: CQ

## 2024-07-20 PROCEDURE — 83605 ASSAY OF LACTIC ACID: CPT | Performed by: ORTHOPAEDIC SURGERY

## 2024-07-20 PROCEDURE — 21400001 HC TELEMETRY ROOM

## 2024-07-20 PROCEDURE — 97116 GAIT TRAINING THERAPY: CPT | Mod: CQ

## 2024-07-20 PROCEDURE — 25000003 PHARM REV CODE 250

## 2024-07-20 PROCEDURE — 80053 COMPREHEN METABOLIC PANEL: CPT | Performed by: ORTHOPAEDIC SURGERY

## 2024-07-20 PROCEDURE — 94761 N-INVAS EAR/PLS OXIMETRY MLT: CPT | Mod: XB

## 2024-07-20 PROCEDURE — 84100 ASSAY OF PHOSPHORUS: CPT | Performed by: ORTHOPAEDIC SURGERY

## 2024-07-20 PROCEDURE — 99900035 HC TECH TIME PER 15 MIN (STAT)

## 2024-07-20 PROCEDURE — 82803 BLOOD GASES ANY COMBINATION: CPT

## 2024-07-20 PROCEDURE — 83735 ASSAY OF MAGNESIUM: CPT | Performed by: ORTHOPAEDIC SURGERY

## 2024-07-20 PROCEDURE — 97530 THERAPEUTIC ACTIVITIES: CPT | Mod: CQ

## 2024-07-20 PROCEDURE — 27000221 HC OXYGEN, UP TO 24 HOURS

## 2024-07-20 PROCEDURE — 25000003 PHARM REV CODE 250: Performed by: NURSE PRACTITIONER

## 2024-07-20 PROCEDURE — 63600175 PHARM REV CODE 636 W HCPCS

## 2024-07-20 PROCEDURE — 94660 CPAP INITIATION&MGMT: CPT

## 2024-07-20 RX ORDER — OXYCODONE HYDROCHLORIDE 10 MG/1
10 TABLET ORAL EVERY 4 HOURS PRN
Status: DISCONTINUED | OUTPATIENT
Start: 2024-07-20 | End: 2024-07-21 | Stop reason: HOSPADM

## 2024-07-20 RX ORDER — OXYCODONE HYDROCHLORIDE 5 MG/1
5 TABLET ORAL
Status: DISCONTINUED | OUTPATIENT
Start: 2024-07-20 | End: 2024-07-21 | Stop reason: HOSPADM

## 2024-07-20 RX ORDER — MIDODRINE HYDROCHLORIDE 5 MG/1
10 TABLET ORAL EVERY 8 HOURS
Status: DISCONTINUED | OUTPATIENT
Start: 2024-07-20 | End: 2024-07-20

## 2024-07-20 RX ORDER — OXYCODONE HYDROCHLORIDE 5 MG/1
5 TABLET ORAL
Status: DISCONTINUED | OUTPATIENT
Start: 2024-07-20 | End: 2024-07-20

## 2024-07-20 RX ADMIN — APIXABAN 5 MG: 5 TABLET, FILM COATED ORAL at 08:07

## 2024-07-20 RX ADMIN — POLYETHYLENE GLYCOL 3350 17 G: 17 POWDER, FOR SOLUTION ORAL at 09:07

## 2024-07-20 RX ADMIN — ACETAMINOPHEN 1000 MG: 325 TABLET ORAL at 05:07

## 2024-07-20 RX ADMIN — MUPIROCIN 1 G: 20 OINTMENT TOPICAL at 09:07

## 2024-07-20 RX ADMIN — MIDODRINE HYDROCHLORIDE 10 MG: 5 TABLET ORAL at 12:07

## 2024-07-20 RX ADMIN — FAMOTIDINE 20 MG: 20 TABLET ORAL at 09:07

## 2024-07-20 RX ADMIN — ACETAMINOPHEN 1000 MG: 325 TABLET ORAL at 06:07

## 2024-07-20 RX ADMIN — METHOCARBAMOL 750 MG: 750 TABLET ORAL at 09:07

## 2024-07-20 RX ADMIN — FAMOTIDINE 20 MG: 20 TABLET ORAL at 08:07

## 2024-07-20 RX ADMIN — OXYCODONE HYDROCHLORIDE 5 MG: 5 TABLET ORAL at 06:07

## 2024-07-20 RX ADMIN — PREGABALIN 75 MG: 75 CAPSULE ORAL at 08:07

## 2024-07-20 RX ADMIN — CEFADROXIL 500 MG: 500 CAPSULE ORAL at 08:07

## 2024-07-20 RX ADMIN — MUPIROCIN 1 G: 20 OINTMENT TOPICAL at 08:07

## 2024-07-20 RX ADMIN — METHOCARBAMOL 750 MG: 750 TABLET ORAL at 02:07

## 2024-07-20 RX ADMIN — ACETAMINOPHEN 1000 MG: 325 TABLET ORAL at 11:07

## 2024-07-20 RX ADMIN — MIDODRINE HYDROCHLORIDE 10 MG: 5 TABLET ORAL at 05:07

## 2024-07-20 RX ADMIN — CELECOXIB 200 MG: 200 CAPSULE ORAL at 09:07

## 2024-07-20 RX ADMIN — SENNOSIDES AND DOCUSATE SODIUM 1 TABLET: 50; 8.6 TABLET ORAL at 08:07

## 2024-07-20 RX ADMIN — METHOCARBAMOL 750 MG: 750 TABLET ORAL at 08:07

## 2024-07-20 RX ADMIN — ACETAMINOPHEN 1000 MG: 325 TABLET ORAL at 12:07

## 2024-07-20 RX ADMIN — INSULIN ASPART 2 UNITS: 100 INJECTION, SOLUTION INTRAVENOUS; SUBCUTANEOUS at 09:07

## 2024-07-20 RX ADMIN — FLUOXETINE HYDROCHLORIDE 40 MG: 20 CAPSULE ORAL at 09:07

## 2024-07-20 RX ADMIN — APIXABAN 5 MG: 5 TABLET, FILM COATED ORAL at 09:07

## 2024-07-20 RX ADMIN — CEFADROXIL 500 MG: 500 CAPSULE ORAL at 09:07

## 2024-07-20 RX ADMIN — SENNOSIDES AND DOCUSATE SODIUM 1 TABLET: 50; 8.6 TABLET ORAL at 09:07

## 2024-07-20 RX ADMIN — OXYCODONE 5 MG: 5 TABLET ORAL at 10:07

## 2024-07-20 NOTE — HPI
Patient is a 59yo with history of t2dm, htn, afib on chronic eliquis who is s/p R knee arthroplasty revision on 7/19. A rapid was called due to borderline low blood pressures. She received IVF boluses and MAPs remained in the mid 60s. Blood gas obtained by primary showed mild mixed acidosis. Her lactate was within normal limits. Patient is answering all questions appropriately and no in any respiratory distress. Bedside echo showed visually normal LV function without overt signs of RV strain. IVC appeared full and somewhat collapsible with inspiration. Hgb mildly decreased from admission but no overt signs of uncontrolled bleed.   She had received some opiates earlier for post-op pain but no doses this evening.   Critical care medicine consulted for assistance with vital signs/ABG derangements.

## 2024-07-20 NOTE — ASSESSMENT & PLAN NOTE
S/p R TKA Revision on 7/18/24.    - Management per primary Orthopedics team  - Multimodal pain medication regimen ideally with less sedating analgesics in setting of hypotension

## 2024-07-20 NOTE — ASSESSMENT & PLAN NOTE
Does not use Home CPAP as she feels claustrophobic.  Mild hypercapnia on VBG.  - Discussed importance of using nightly cpap.  - Avoid opioids/sedating meds if possible

## 2024-07-20 NOTE — CARE UPDATE
Patient with hypotension that has mildly improved with 2L bolus. She is asymptomatic, and Aox 4 to person, time, place, and situation.    EKG normal, chest x-ray without consolidation but interstitial findings that may reflect shallow inspiratory effort noted.    Surgical home aware, they bolused PNC for pain.  Medicine consulted for comanagement reached out for further workup of hypotension that she has currently been asymptomatic.  We will follow up for recommendations.  --  Chung Monroe MD

## 2024-07-20 NOTE — HPI
Alivia Thomas is a 58 year old woman with history of R MCA ischemic stroke (2024 s/p TnK with no residual deficits), T2DM, PADDY, BMI > 40 s/p gastric sleeve, and chronic back and knee pain, who presents after revision of R TKA on 7/18/24. Patient with prior R TKA done around 2009/2010, with revision in 2014, however now with continued and progressively worsening pain limiting her daily activities. Underwent evaluation for revision of Right knee prosthetic and is s/p revision on 7/18. Operative findings included osteolysis on mainly in the lateral femoral condyle with pitting wear of the tibial insert. No complications during the case. The following day, on 7/19, patient noted to be persistently hypotensive, with charted BP as low as 61/37. She received approximately 4.7L IVF through 2L boluses and continuous mIVF running at 125 cc/hr from the prior day. Patient with only modest improvement in BP up to 96/50. Despite persistent hypotension, patient remained largely asymptomatic, though reported feeling sleepy at times. MICU was consulted and felt that as patient was asymptomatic and with no signs of end-organ damage, no need to escalate care to ICU at this time. Labs with normal lactates, Cr, and troponin. VBG with pH 7.26, pCO2 47, bicarb 21.6. CXR unremarkable. Denied any chest pain, SOB, abdominal pain, fevers, chills/rigors, or dysuria.

## 2024-07-20 NOTE — ASSESSMENT & PLAN NOTE
Chronic issue. Does not use Home CPAP as she feels claustrophobic. VBG with signs of respiratory acidosis with pH 7.26 and CO2 47.7.    - Discussed importance of using nightly CPAP.  - Avoid opioids/sedating meds if possible

## 2024-07-20 NOTE — ASSESSMENT & PLAN NOTE
Alivia Thomas is a 58 y.o. female is s/p R revision TKA on 7/18/24    Surgical dressing C/D/I, bulky dressing taken down, ice in place  Pain control: multimodal, Anesthesia Surgical Home following  PT/OT: WBAT RLE   T scope 0-90 degrees   DVT PPx: home eliquis, FCDs at all times when not ambulating  Abx: postop Ancef, then cefadroxil PO x7 days  Drain: none  Kaufman: none    Dispo: will discuss further plan with staff

## 2024-07-20 NOTE — CARE UPDATE
RAPID RESPONSE NURSE PROACTIVE ROUNDING NOTE       Time of Visit:     Admit Date: 2024  LOS: 2  Code Status: Prior   Date of Visit: 2024  : 1965  Age: 58 y.o.  Sex: female  Race: Black or   Bed: 502/502 A:   MRN: [id number]  Was the patient discharged from an ICU this admission? No   Was the patient discharged from a PACU within last 24 hours? No   Did the patient receive conscious sedation/general anesthesia in last 24 hours? No  Was the patient in the ED within the past 24 hours? No  Was the patient on NIPPV within the past 24 hours? No   Attending Physician: Theodore Swan MD  Primary Service: Orthopedic Surgery   Time spent at the bedside: 45 - 60 min    SITUATION    Notified by previous RRN during handoff.  Reason for alert: hypotension  Called to evaluate the patient for Circulatory    BACKGROUND     Why is the patient in the hospital?: Failed total right knee replacement    Patient has a past medical history of Acute right MCA stroke, Allergy, Anemia, Asthma, Bilateral shoulder bursitis, Cervical stenosis of spine, Chronic pain, DDD (degenerative disc disease), cervical, Depression, Diabetes mellitus type II, DJD (degenerative joint disease) of knee, Facet arthritis of lumbar region, Facet syndrome, GERD (gastroesophageal reflux disease), Heartburn, Hyperlipidemia, Hypertension, Morbid obesity, Neuromuscular disorder, PADDY (obstructive sleep apnea), Paroxysmal atrial fibrillation, Proteinuria, Right carpal tunnel syndrome, Sacroiliitis, Sacroiliitis, Sleep apnea, and Uterine fibroid.    Last Vitals:  Temp: 98 °F (36.7 °C) (2315)  Pulse: 64 (2258)  Resp: 16 (2320)  BP: 85/53 (2336)  SpO2: 97 % (2320)    24 Hours Vitals Range:  Temp:  [97.4 °F (36.3 °C)-98 °F (36.7 °C)]   Pulse:  [50-67]   Resp:  [14-21]   BP: (61-96)/(37-70)   SpO2:  [93 %-100 %]     Labs:  Recent Labs     24  1343 24  2305 24  2309   WBC 11.36  --  7.12  "  HGB 11.3*  --  10.1*   HCT 35.3* 30* 33.0*     --  165       Recent Labs     24  0307 24  2309    140   K 4.5 4.1   * 114*   CO2 23 19*   BUN 18 26*   CREATININE 0.8 0.9   * 150*   MG  --  1.9        Recent Labs     24  2305   PH 7.263*   PCO2 47.7*   PO2 48   HCO3 21.6*   POCSATURATED 77   BE -5*        ASSESSMENT      Physical Exam  Cardiovascular:      Rate and Rhythm: Normal rate and regular rhythm.   Pulmonary:      Breath sounds: Decreased breath sounds present.   Musculoskeletal:      Right lower leg: Swelling present. 1+ Edema present.      Left lower le+ Edema present.      Comments: Brace, dressing in place to R knee   Skin:     General: Skin is warm and dry.   Neurological:      Mental Status: She is oriented to person, place, and time. She is lethargic.     HR 67  BP 82/50 (62) automatic, 84/42 (56) manual  RR 16, SpO2 96% on 3L NC  Temp 98    Pt lying in bed, opens eyes to voice, fully oriented. Goes quickly back to sleep without stimulation or in between questions/care. No focal deficits or unilateral weakness. Denies any dizziness/lightheadedness while at rest, just states she feels "so tired." Does c/o dizziness when trying to stand up    NS at 150cc/hr to PIV.   Per bedside RN has had good urine output    INTERVENTIONS    The patient was seen for Cardiac problem. Staff concerns included hypotension. The following interventions were performed: BMP, CBC, Magnesium, Troponin, portable chest x-ray, continuous cardiac monitoring continued, critical care consult, and lactic, POCT venous blood gas with lytes, lactic, procal, BNP.    Of note, with continuous fluids and fluid boluses, pt has received 5.6L IV fluids, with 2L bolused today  BP is not normally low, previous blood pressures show systolics 120s-140s.   Pt also has orders for CPAP QHS but has been refusing d/t claustrophobia     Orthopedic surgery paged, to bedside to evaluate pt    STAT labs drawn " and sent by RRN  VBG notable for pH 7.263, CO2 47.7, HCO3 21.6, BE -5    Continuous fluid discontinued by MD    Critical care consulted by Dr Monroe with ortho, to come to bedside    RECOMMENDATIONS    Follow up with labs, imaging  Would utilize extreme caution with any further fluid resuscitation  Consider echo  Strict I&O  Follow up with CCM recommendation    PROVIDER ESCALATION    Yes/No  Yes    Orders received and case discussed with Dr. Monroe with orthopedic surgery .    Disposition: Remain in room 502 until critical care able to evaluate pt at bedside.    FOLLOW-UP    Bedside RNNya, charge DARIUSZ Pinto  updated on plan of care. Instructed to call the Rapid Response NurseYNES RN at [phone] for additional questions or concerns.

## 2024-07-20 NOTE — ASSESSMENT & PLAN NOTE
Body mass index is 42.56 kg/m². Morbid obesity complicates all aspects of disease management from diagnostic modalities to treatment. Weight loss encouraged and health benefits explained to patient.

## 2024-07-20 NOTE — NURSING
Pt remains hypotensive at shift change, BP 81/49 with vitals machine and 78/40 manually. MD Monroe aware, STAT EKG ordered, medicine consult placed.    EKG results normal sinus rhythm, 66BPM. MD Monroe aware.

## 2024-07-20 NOTE — NURSING
Nurses Note -- 4 Eyes      7/20/2024   6:52 PM      Skin assessed during: Q Shift Change      [x] No Altered Skin Integrity Present    []Prevention Measures Documented      [] Yes- Altered Skin Integrity Present or Discovered   [] LDA Added if Not in Epic (Describe Wound)   [] New Altered Skin Integrity was Present on Admit and Documented in LDA   [] Wound Image Taken    Wound Care Consulted? No    Attending Nurse:  monique Martínez lpn    Second RN/Staff Member:   Mary Jane ARZOLA

## 2024-07-20 NOTE — ANESTHESIA POST-OP PAIN MANAGEMENT
Acute Pain Service Progress Note    Alivia Thomas is a 58 y.o., female, [id number].    Surgery:  REVISION, ARTHROPLASTY, KNEE: RIGHT      Post Op Day #: 2     Catheter type: perineural  R Adductor     Infusion type: Ropivacaine 0.2%  7mL q3h IB w/ 5mL q30m DB    Problem List:    Active Hospital Problems    Diagnosis  POA    *Failed total right knee replacement [T84.012A]  Not Applicable    Postprocedural hypotension [I95.81]  No    PADDY (obstructive sleep apnea) [G47.33]  Yes     Chronic      Resolved Hospital Problems   No resolved problems to display.       Subjective:                General appearance of alert, oriented, no complaints              Pain with rest: 4    Numbers              Pain with movement: 6    Numbers              Side Effects                          1. Pruritis No                          2. Nausea No                          3. Motor Blockade No, 0=Ability to raise lower extremities off bed                          4. Sedation No, 1=awake and alert     Objective:                 Catheter site clean, dry, intact      Vitals   Vitals:    07/20/24 0437   BP: (!) 97/55   Pulse: 69   Resp: 16   Temp: 36.7 °C (98.1 °F)        Labs    Admission on 07/18/2024   Component Date Value Ref Range Status    Body Fluid Type 07/18/2024 Joint Fluid, Right Knee   Final    Fluid Appearance 07/18/2024 Bloody   Final    Fluid Color 07/18/2024 Red   Final    WBC, Body Fluid 07/18/2024 1775  /cu mm Final    Segs, Fluid 07/18/2024 31  % Final    Lymphs, Fluid 07/18/2024 29  % Final    Monocytes/Macrophages, Fluid 07/18/2024 40  % Final    Anaerobic Culture 07/18/2024 Culture in progress   Preliminary    Aerobic Bacterial Culture 07/18/2024 No growth   Preliminary    AFB Culture & Smear 07/18/2024 Culture in progress   Preliminary    AFB CULTURE STAIN 07/18/2024 No acid fast bacilli seen.   Final    Fungus (Mycology) Culture 07/18/2024 Culture in progress   Preliminary    AFB Culture & Smear 07/18/2024 Culture in  progress   Preliminary    AFB CULTURE STAIN 07/18/2024 No acid fast bacilli seen.   Final    Fungus (Mycology) Culture 07/18/2024 Culture in progress   Preliminary    Anaerobic Culture 07/18/2024 Culture in progress   Preliminary    Aerobic Bacterial Culture 07/18/2024 No growth   Preliminary    Anaerobic Culture 07/18/2024 Culture in progress   Preliminary    Aerobic Bacterial Culture 07/18/2024 No growth   Preliminary    AFB Culture & Smear 07/18/2024 Culture in progress   Preliminary    AFB CULTURE STAIN 07/18/2024 No acid fast bacilli seen.   Final    Fungus (Mycology) Culture 07/18/2024 Culture in progress   Preliminary    POCT Glucose 07/18/2024 145 (H)  70 - 110 mg/dL Final    Sodium 07/19/2024 141  136 - 145 mmol/L Final    Potassium 07/19/2024 4.5  3.5 - 5.1 mmol/L Final    Chloride 07/19/2024 111 (H)  95 - 110 mmol/L Final    CO2 07/19/2024 23  23 - 29 mmol/L Final    Glucose 07/19/2024 118 (H)  70 - 110 mg/dL Final    BUN 07/19/2024 18  6 - 20 mg/dL Final    Creatinine 07/19/2024 0.8  0.5 - 1.4 mg/dL Final    Calcium 07/19/2024 8.4 (L)  8.7 - 10.5 mg/dL Final    Total Protein 07/19/2024 6.2  6.0 - 8.4 g/dL Final    Albumin 07/19/2024 3.0 (L)  3.5 - 5.2 g/dL Final    Total Bilirubin 07/19/2024 0.5  0.1 - 1.0 mg/dL Final    Alkaline Phosphatase 07/19/2024 53 (L)  55 - 135 U/L Final    AST 07/19/2024 20  10 - 40 U/L Final    ALT 07/19/2024 21  10 - 44 U/L Final    eGFR 07/19/2024 >60.0  >60 mL/min/1.73 m^2 Final    Anion Gap 07/19/2024 7 (L)  8 - 16 mmol/L Final    POCT Glucose 07/19/2024 272 (H)  70 - 110 mg/dL Final    POCT Glucose 07/19/2024 104  70 - 110 mg/dL Final    Lactate (Lactic Acid) 07/19/2024 1.1  0.5 - 2.2 mmol/L Final    WBC 07/19/2024 11.36  3.90 - 12.70 K/uL Final    RBC 07/19/2024 3.52 (L)  4.00 - 5.40 M/uL Final    Hemoglobin 07/19/2024 11.3 (L)  12.0 - 16.0 g/dL Final    Hematocrit 07/19/2024 35.3 (L)  37.0 - 48.5 % Final    MCV 07/19/2024 100 (H)  82 - 98 fL Final    MCH 07/19/2024 32.1  (H)  27.0 - 31.0 pg Final    MCHC 07/19/2024 32.0  32.0 - 36.0 g/dL Final    RDW 07/19/2024 15.0 (H)  11.5 - 14.5 % Final    Platelets 07/19/2024 196  150 - 450 K/uL Final    MPV 07/19/2024 10.8  9.2 - 12.9 fL Final    Immature Granulocytes 07/19/2024 0.3  0.0 - 0.5 % Final    Gran # (ANC) 07/19/2024 9.6 (H)  1.8 - 7.7 K/uL Final    Immature Grans (Abs) 07/19/2024 0.03  0.00 - 0.04 K/uL Final    Lymph # 07/19/2024 1.0  1.0 - 4.8 K/uL Final    Mono # 07/19/2024 0.6  0.3 - 1.0 K/uL Final    Eos # 07/19/2024 0.1  0.0 - 0.5 K/uL Final    Baso # 07/19/2024 0.04  0.00 - 0.20 K/uL Final    nRBC 07/19/2024 0  0 /100 WBC Final    Gran % 07/19/2024 84.6 (H)  38.0 - 73.0 % Final    Lymph % 07/19/2024 8.5 (L)  18.0 - 48.0 % Final    Mono % 07/19/2024 5.6  4.0 - 15.0 % Final    Eosinophil % 07/19/2024 0.6  0.0 - 8.0 % Final    Basophil % 07/19/2024 0.4  0.0 - 1.9 % Final    Differential Method 07/19/2024 Automated   Final    POCT Glucose 07/19/2024 125 (H)  70 - 110 mg/dL Final    Sodium 07/19/2024 140  136 - 145 mmol/L Final    Potassium 07/19/2024 4.1  3.5 - 5.1 mmol/L Final    Chloride 07/19/2024 114 (H)  95 - 110 mmol/L Final    CO2 07/19/2024 19 (L)  23 - 29 mmol/L Final    Glucose 07/19/2024 150 (H)  70 - 110 mg/dL Final    BUN 07/19/2024 26 (H)  6 - 20 mg/dL Final    Creatinine 07/19/2024 0.9  0.5 - 1.4 mg/dL Final    Calcium 07/19/2024 7.8 (L)  8.7 - 10.5 mg/dL Final    Anion Gap 07/19/2024 7 (L)  8 - 16 mmol/L Final    eGFR 07/19/2024 >60.0  >60 mL/min/1.73 m^2 Final    WBC 07/19/2024 7.12  3.90 - 12.70 K/uL Final    RBC 07/19/2024 3.25 (L)  4.00 - 5.40 M/uL Final    Hemoglobin 07/19/2024 10.1 (L)  12.0 - 16.0 g/dL Final    Hematocrit 07/19/2024 33.0 (L)  37.0 - 48.5 % Final    MCV 07/19/2024 102 (H)  82 - 98 fL Final    MCH 07/19/2024 31.1 (H)  27.0 - 31.0 pg Final    MCHC 07/19/2024 30.6 (L)  32.0 - 36.0 g/dL Final    RDW 07/19/2024 15.0 (H)  11.5 - 14.5 % Final    Platelets 07/19/2024 165  150 - 450 K/uL Final    MPV  07/19/2024 11.5  9.2 - 12.9 fL Final    Immature Granulocytes 07/19/2024 0.4  0.0 - 0.5 % Final    Gran # (ANC) 07/19/2024 5.5  1.8 - 7.7 K/uL Final    Immature Grans (Abs) 07/19/2024 0.03  0.00 - 0.04 K/uL Final    Lymph # 07/19/2024 1.0  1.0 - 4.8 K/uL Final    Mono # 07/19/2024 0.4  0.3 - 1.0 K/uL Final    Eos # 07/19/2024 0.1  0.0 - 0.5 K/uL Final    Baso # 07/19/2024 0.02  0.00 - 0.20 K/uL Final    nRBC 07/19/2024 0  0 /100 WBC Final    Gran % 07/19/2024 77.5 (H)  38.0 - 73.0 % Final    Lymph % 07/19/2024 13.9 (L)  18.0 - 48.0 % Final    Mono % 07/19/2024 5.9  4.0 - 15.0 % Final    Eosinophil % 07/19/2024 2.0  0.0 - 8.0 % Final    Basophil % 07/19/2024 0.3  0.0 - 1.9 % Final    Differential Method 07/19/2024 Automated   Final    Magnesium 07/19/2024 1.9  1.6 - 2.6 mg/dL Final    Lactate (Lactic Acid) 07/19/2024 0.9  0.5 - 2.2 mmol/L Final    BNP 07/19/2024 128 (H)  0 - 99 pg/mL Final    Procalcitonin 07/19/2024 0.12  <0.25 ng/mL Final    POC Lactate 07/19/2024 0.65  0.5 - 2.2 mmol/L Final    Sample 07/19/2024 VENOUS   Final    Site 07/19/2024 Other   Final    Allens Test 07/19/2024 N/A   Final    POC PH 07/19/2024 7.263 (LL)  7.35 - 7.45 Final    POC PCO2 07/19/2024 47.7 (H)  35 - 45 mmHg Final    POC PO2 07/19/2024 48  40 - 60 mmHg Final    POC HCO3 07/19/2024 21.6 (L)  24 - 28 mmol/L Final    POC BE 07/19/2024 -5 (L)  -2 to 2 mmol/L Final    POC SATURATED O2 07/19/2024 77  95 - 100 % Final    POC Sodium 07/19/2024 143  136 - 145 mmol/L Final    POC Potassium 07/19/2024 4.0  3.5 - 5.1 mmol/L Final    POC TCO2 07/19/2024 23 (L)  24 - 29 mmol/L Final    POC Ionized Calcium 07/19/2024 1.23  1.06 - 1.42 mmol/L Final    POC Hematocrit 07/19/2024 30 (L)  36 - 54 %PCV Final    Sample 07/19/2024 VENOUS   Final    Site 07/19/2024 Other   Final    Allens Test 07/19/2024 N/A   Final    Cortisol, 8 AM 07/20/2024 7.60  4.30 - 22.40 ug/dL Final    Troponin I 07/19/2024 <0.006  0.000 - 0.026 ng/mL Final    TSH 07/19/2024  0.824  0.400 - 4.000 uIU/mL Final    Cortisol 07/19/2024 8.20  ug/dL Final    POC PH 07/20/2024 7.264 (LL)  7.35 - 7.45 Final    POC PCO2 07/20/2024 49.5 (H)  35 - 45 mmHg Final    POC PO2 07/20/2024 33 (LL)  40 - 60 mmHg Final    POC HCO3 07/20/2024 22.4 (L)  24 - 28 mmol/L Final    POC BE 07/20/2024 -5 (L)  -2 to 2 mmol/L Final    POC SATURATED O2 07/20/2024 54  95 - 100 % Final    POC TCO2 07/20/2024 24  24 - 29 mmol/L Final    Sample 07/20/2024 VENOUS   Final    Site 07/20/2024 Other   Final    Allens Test 07/20/2024 N/A   Final        Meds   Current Facility-Administered Medications   Medication Dose Route Frequency Provider Last Rate Last Admin    acetaminophen tablet 1,000 mg  1,000 mg Oral Q6H Seema Weinberg NP   1,000 mg at 07/20/24 0533    apixaban tablet 5 mg  5 mg Oral BID Harvey Penn MD   5 mg at 07/19/24 2045    bisacodyL suppository 10 mg  10 mg Rectal Q12H PRN Seema Weinberg NP        cefadroxil capsule 500 mg  500 mg Oral Q12H Harvey Penn MD   500 mg at 07/19/24 2045    celecoxib capsule 200 mg  200 mg Oral Daily Seema Weinberg NP   200 mg at 07/19/24 0946    dextrose 10% bolus 125 mL 125 mL  12.5 g Intravenous PRN Harvey Penn MD        dextrose 10% bolus 250 mL 250 mL  25 g Intravenous PRN Harvey Penn MD        famotidine tablet 20 mg  20 mg Oral BID Seema Weinberg NP   20 mg at 07/19/24 2045    FLUoxetine capsule 40 mg  40 mg Oral Daily Harvey Penn MD   40 mg at 07/19/24 0946    glucagon (human recombinant) injection 1 mg  1 mg Intramuscular PRN Harvey Penn MD        glucose chewable tablet 16 g  16 g Oral PRN Harvey Penn MD        glucose chewable tablet 24 g  24 g Oral PRN Harvey Penn MD        insulin aspart U-100 pen 0-10 Units  0-10 Units Subcutaneous QID (AC + HS) Harvey Degroot MD        methocarbamoL tablet 750 mg  750 mg Oral TID Seema Weinberg NP   750 mg at 07/19/24 2045    midodrine tablet 10 mg  10 mg Oral Q8H Osmar  Oriana Prather MD   10 mg at 07/20/24 0533    morphine injection 2 mg  2 mg Intravenous Q3H PRN Seema Weinberg NP        mupirocin 2 % ointment 1 g  1 g Nasal BID Seema Weinberg NP   1 g at 07/19/24 2045    naloxone 0.4 mg/mL injection 0.02 mg  0.02 mg Intravenous PRN Seema Weinberg NP        ondansetron injection 4 mg  4 mg Intravenous Q8H PRN Seema Weinberg NP        oxyCODONE immediate release tablet 5 mg  5 mg Oral Q3H PRN Letitia Leal MD        polyethylene glycol packet 17 g  17 g Oral Daily Seema Weinberg NP   17 g at 07/19/24 0900    pregabalin capsule 75 mg  75 mg Oral QHS Seema Weinberg NP   75 mg at 07/18/24 2159    prochlorperazine injection Soln 5 mg  5 mg Intravenous Q6H PRN Seema Weinberg NP        ropivacaine 0.2% Perineural Pump infusion 500 ML   Perineural Continuous Letitia Leal MD   New Bag at 07/18/24 1734    senna-docusate 8.6-50 mg per tablet 1 tablet  1 tablet Oral BID Seema Weinberg NP   1 tablet at 07/19/24 2045     Facility-Administered Medications Ordered in Other Encounters   Medication Dose Route Frequency Provider Last Rate Last Admin    0.9%  NaCl infusion   Intravenous Continuous Lina Wagner MD 25 mL/hr at 12/15/20 1506 25 mL/hr at 12/15/20 1506    0.9%  NaCl infusion   Intravenous Continuous Ciro Chung MD        0.9%  NaCl infusion   Intravenous Continuous Santos Barron MD                       Anticoagulant dose apixaban at 5mg BID.     Assessment:  57 yo F w/ PMHx of Opioid use, Asthma, BMI 41.24, DM2, GERD, HLD, PADDY, AFIB - apixaban, MCA Stroke 1/2024 (no residual deficits), Gout, Gastric Sleeve, History of COVID 2020. Pain adequately controlled and patient doing well. Pt going home with home health     Plan:     1) Puase Adductor PNC at current infusion rate: Ropivacaine 0.2%  7mL q3h IB w/ 5mL q30m DB. Will attempt to pull later today if pain tolerated  2) Continue multimodals including Tylenol, Celecoxib, Robaxin,  Pregabalin  3) Oxy 5mg q3h PRN for moderate and severe pain  4) Oxy 10mg d/c'd 2/2 not requiring them and pt with lower Bps overnight    Case discussed with staff, Dr. Gonzalez; final recommendations per attestation above.      Thank you for your consult and allowing us to participate in the care of this patient. We will continue to follow along. Please call the Acute Pain Service/Anesthesia if you have any questions or concerns.

## 2024-07-20 NOTE — ASSESSMENT & PLAN NOTE
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 Developed persistent hypotension on 7/19 that only modestly improved BP with borderline MAPs that persisted throughout the day. Initial labs without evidence of end organ hypoperfusion---serial lactates negative, Cr baseline, trop negative, and mentation is intact. MICU was consulted overnight and performed bedside echo that was without signs of decreased contractility or RV strain. On exam, pt perfusing extremities appropriately and no evidence of active bleeding. No other clinical signs to indicate infection with normal WBC and no fevers. Given the above, less concern for shock. Suspect hypotension is multifactorial with the following: procedural anesthesia, metoprolol XR given earlier on 7/18 while patient was already hypotensive, post-operative analgesia with opioid medications.    - Please hold Midodrine 10 mg this afternoon and continue to monitor BP off of midodrine.  - Continue to hold antihypertensives such as metoprolol at this time. If hypertensive, can resume metoprolol prior to discharge. Otherwise would hold on discharge until follow up with PCP.  - Avoid sedating meds/opioids with use of multimodal agents for pain control.  - If persistently hypotensive again, would pursue infectious workup though no clinical signs of infection at this time.

## 2024-07-20 NOTE — ADDENDUM NOTE
Addendum  created 07/20/24 0925 by Geraldine Gonzalez MD    Charge Capture section accepted, Cosign clinical note with attestation

## 2024-07-20 NOTE — ASSESSMENT & PLAN NOTE
Chronic, controlled. Latest blood pressure and vitals reviewed-     Temp:  [97.4 °F (36.3 °C)-99.1 °F (37.3 °C)]   Pulse:  [50-69]   Resp:  [16-21]   BP: (61-99)/(37-70)   SpO2:  [94 %-100 %] .   Home meds for hypertension were reviewed and noted below.   Hypertension Medications               metoprolol succinate (TOPROL-XL) 25 MG 24 hr tablet Take 1 tablet (25 mg total) by mouth once daily.          - Holding all antihypertensives in setting of hypotension

## 2024-07-20 NOTE — PROGRESS NOTES
[facility] - [facility]  Orthopedics  Progress Note    Patient Name: Alivia Marie Cyr  MRN: [id number]  Admission Date: 7/18/2024  Hospital Length of Stay: 2 days  Attending Provider: Theodore Swan MD  Primary Care Provider: Clarisse Richardson MD  Follow-up For: Procedure(s) (LRB):  REVISION, ARTHROPLASTY, KNEE: RIGHT (Right)    Post-Operative Day: 2 Days Post-Op  Subjective:     Principal Problem:Failed total right knee replacement    Principal Orthopedic Problem: same    Interval History: Patient seen and examined at bedside. S/p right revsion TKA on 7/18/24. Patient has episodes of hypotension overnight despite receiving continuous fluids and 2L bolus during the day. BG found patient was acidotic to 7.26 with normal lactate. EKG grossly normal and chest XR shows some interstitial changes that could be related to fluid status however bedside cardiac echo was normal without signs of right hard strain or volume overload.     Medicine recommended critical care recommended midodrine 10mg TID, avoid sedating meds/opioids, hold metoprolol, and recheck lactate this morning. Given her obesity and PADDY, patient was encouraged to use her CPAP overnight. This morning her pressure has improved to 97/70. She is AO x 4 and states that her pain is well controlled. Patient walked 50 ft today.     Remain in knee brace, continue to work with therapy, and we will continue to monitor pressures today. She appears improved. If she remains improved, will discuss potential admission vs continued overnight stay with staff today.     Review of patient's allergies indicates:   Allergen Reactions    Strawberry Anaphylaxis       Current Facility-Administered Medications   Medication    acetaminophen tablet 1,000 mg    apixaban tablet 5 mg    bisacodyL suppository 10 mg    cefadroxil capsule 500 mg    celecoxib capsule 200 mg    dextrose 10% bolus 125 mL 125 mL    dextrose 10% bolus 250 mL 250 mL    famotidine tablet 20 mg    FLUoxetine  "capsule 40 mg    glucagon (human recombinant) injection 1 mg    glucose chewable tablet 16 g    glucose chewable tablet 24 g    insulin aspart U-100 pen 0-10 Units    methocarbamoL tablet 750 mg    midodrine tablet 10 mg    morphine injection 2 mg    mupirocin 2 % ointment 1 g    naloxone 0.4 mg/mL injection 0.02 mg    ondansetron injection 4 mg    oxyCODONE immediate release tablet 5 mg    oxyCODONE immediate release tablet Tab 10 mg    polyethylene glycol packet 17 g    pregabalin capsule 75 mg    prochlorperazine injection Soln 5 mg    ropivacaine 0.2% Perineural Pump infusion 500 ML    senna-docusate 8.6-50 mg per tablet 1 tablet     Facility-Administered Medications Ordered in Other Encounters   Medication    0.9%  NaCl infusion    0.9%  NaCl infusion    0.9%  NaCl infusion     Objective:     Vital Signs (Most Recent):  Temp: 98.1 °F (36.7 °C) (07/20/24 0437)  Pulse: 69 (07/20/24 0437)  Resp: 16 (07/20/24 0437)  BP: (!) 97/55 (07/20/24 0437)  SpO2: 98 % (07/20/24 0437) Vital Signs (24h Range):  Temp:  [97.4 °F (36.3 °C)-98.1 °F (36.7 °C)] 98.1 °F (36.7 °C)  Pulse:  [50-69] 69  Resp:  [14-21] 16  SpO2:  [95 %-100 %] 98 %  BP: (61-97)/(37-70) 97/55     Weight: 116 kg (255 lb 11.7 oz)  Height: 5' 5" (165.1 cm)  Body mass index is 42.56 kg/m².      Intake/Output Summary (Last 24 hours) at 7/20/2024 0513  Last data filed at 7/19/2024 2233  Gross per 24 hour   Intake 4552.69 ml   Output 500 ml   Net 4052.69 ml        Ortho/SPM Exam  AAOx4  NAD  Reg rate  No increased WOB    RLE:  Dressing c/d/i  Polar ice in place  SILT T/SP/DP/Mclean/Sa  Motor intact T/SP/DP  WWP extremities  FCDs in place and functioning  T scope brace in place       Significant Labs: All pertinent labs within the past 24 hours have been reviewed.    Significant Imaging: I have reviewed all pertinent imaging results/findings.  Assessment/Plan:     * Failed total right knee replacement  Alivia Thomas is a 58 y.o. female is s/p R revision TKA on " 7/18/24    Surgical dressing C/D/I, bulky dressing taken down, ice in place  Pain control: multimodal, Anesthesia Surgical Home following  PT/OT: WBAT RLE   T scope 0-90 degrees   DVT PPx: home eliquis, FCDs at all times when not ambulating  Abx: postop Ancef, then cefadroxil PO x7 days  Drain: none  Kaufman: none    Dispo: will discuss further plan with staff             YOLANDA FERNANDEZ MD  Orthopedics  [facility] - Surgery

## 2024-07-20 NOTE — CARE UPDATE
Adductor PNC removed with blue tip intact.     Thank you for your consult and allowing us to participate in the care of this patient. We will continue sign off. Please call the Acute Pain Service/Anesthesia if you have any questions or concerns.    Letitia Leal ([place] Name: MD Mckenna

## 2024-07-20 NOTE — ASSESSMENT & PLAN NOTE
Patient developed borderline low MAPs that have persisted throughout the day.  There is no evidence of end organ hypoperfusion. Lactic acid is normal, mentation is intact, Cr is stable. Bedside echo without signs of decreased contractility or RV strain. No other clinical signs to indicate infection. S/p IVF resuscitation.   - Contributing factors include: procedural anesthesia, metoprolol XR that was given earlier, opioids.   - Stop Metoprolol  - MAP goal >60.  - For completeness, check 8 am cortisol and TSH levels.  - Ordered midodrine 10 mg tid  - Avoid sedating meds/opioids.   - Can recheck lactic acid with am labs for reassurance.

## 2024-07-20 NOTE — SUBJECTIVE & OBJECTIVE
Principal Problem:Failed total right knee replacement    Principal Orthopedic Problem: same    Interval History: Patient seen and examined at bedside. S/p right revsion TKA on 7/18/24. Patient has episodes of hypotension overnight despite receiving continuous fluids and 2L bolus during the day. BG found patient was acidotic to 7.26 with normal lactate. EKG grossly normal and chest XR shows some interstitial changes that could be related to fluid status however bedside cardiac echo was normal without signs of right hard strain or volume overload.     Medicine recommended critical care recommended midodrine 10mg TID, avoid sedating meds/opioids, hold metoprolol, and recheck lactate this morning. Given her obesity and PADDY, patient was encouraged to use her CPAP overnight. This morning her pressure has improved to 97/70. She is AO x 4 and states that her pain is well controlled. Patient walked 50 ft today.     Remain in knee brace, continue to work with therapy, and we will continue to monitor pressures today. She appears improved. If she remains improved, will discuss potential admission vs continued overnight stay with staff today.     Review of patient's allergies indicates:   Allergen Reactions    Strawberry Anaphylaxis       Current Facility-Administered Medications   Medication    acetaminophen tablet 1,000 mg    apixaban tablet 5 mg    bisacodyL suppository 10 mg    cefadroxil capsule 500 mg    celecoxib capsule 200 mg    dextrose 10% bolus 125 mL 125 mL    dextrose 10% bolus 250 mL 250 mL    famotidine tablet 20 mg    FLUoxetine capsule 40 mg    glucagon (human recombinant) injection 1 mg    glucose chewable tablet 16 g    glucose chewable tablet 24 g    insulin aspart U-100 pen 0-10 Units    methocarbamoL tablet 750 mg    midodrine tablet 10 mg    morphine injection 2 mg    mupirocin 2 % ointment 1 g    naloxone 0.4 mg/mL injection 0.02 mg    ondansetron injection 4 mg    oxyCODONE immediate release tablet 5 mg  "   oxyCODONE immediate release tablet Tab 10 mg    polyethylene glycol packet 17 g    pregabalin capsule 75 mg    prochlorperazine injection Soln 5 mg    ropivacaine 0.2% Perineural Pump infusion 500 ML    senna-docusate 8.6-50 mg per tablet 1 tablet     Facility-Administered Medications Ordered in Other Encounters   Medication    0.9%  NaCl infusion    0.9%  NaCl infusion    0.9%  NaCl infusion     Objective:     Vital Signs (Most Recent):  Temp: 98.1 °F (36.7 °C) (07/20/24 0437)  Pulse: 69 (07/20/24 0437)  Resp: 16 (07/20/24 0437)  BP: (!) 97/55 (07/20/24 0437)  SpO2: 98 % (07/20/24 0437) Vital Signs (24h Range):  Temp:  [97.4 °F (36.3 °C)-98.1 °F (36.7 °C)] 98.1 °F (36.7 °C)  Pulse:  [50-69] 69  Resp:  [14-21] 16  SpO2:  [95 %-100 %] 98 %  BP: (61-97)/(37-70) 97/55     Weight: 116 kg (255 lb 11.7 oz)  Height: 5' 5" (165.1 cm)  Body mass index is 42.56 kg/m².      Intake/Output Summary (Last 24 hours) at 7/20/2024 0513  Last data filed at 7/19/2024 2233  Gross per 24 hour   Intake 4552.69 ml   Output 500 ml   Net 4052.69 ml        Ortho/SPM Exam  AAOx4  NAD  Reg rate  No increased WOB    RLE:  Dressing c/d/i  Polar ice in place  SILT T/SP/DP/Mclean/Sa  Motor intact T/SP/DP  WWP extremities  FCDs in place and functioning  T scope brace in place       Significant Labs: All pertinent labs within the past 24 hours have been reviewed.    Significant Imaging: I have reviewed all pertinent imaging results/findings.  "

## 2024-07-20 NOTE — ASSESSMENT & PLAN NOTE
Hx of stroke in 2024. TNK aborted R hemispheric ischemic stroke (MR-negative) 1/4/24.  Etiology likely PAF     - Continue Atorvastatin 80  - Continue Eliquis 5 BID

## 2024-07-20 NOTE — SUBJECTIVE & OBJECTIVE
Past Medical History:   Diagnosis Date    Acute right MCA stroke 01/04/2024    Allergy     Anemia     Asthma     Bilateral shoulder bursitis     Cervical stenosis of spine     Chronic pain     DDD (degenerative disc disease), cervical     Depression 01/28/2019    Diabetes mellitus type II     DJD (degenerative joint disease) of knee 06/19/2014    Facet arthritis of lumbar region 12/17/2015    Facet syndrome 12/17/2015    GERD (gastroesophageal reflux disease)     Heartburn     Hyperlipidemia     Hypertension     Morbid obesity     Neuromuscular disorder     PADDY (obstructive sleep apnea)     Paroxysmal atrial fibrillation 03/19/2024    Proteinuria     Right carpal tunnel syndrome     Sacroiliitis 06/13/2018    Sacroiliitis     Sleep apnea     Uterine fibroid        Past Surgical History:   Procedure Laterality Date    CARPAL TUNNEL RELEASE      CARPAL TUNNEL RELEASE  1980s    left    CARPAL TUNNEL RELEASE  2012    right    CATARACT EXTRACTION W/  INTRAOCULAR LENS IMPLANT Left 08/08/2023    DR. STOKSE    CATARACT EXTRACTION W/  INTRAOCULAR LENS IMPLANT Right 08/29/2023        COLONOSCOPY N/A 06/15/2020    Procedure: COLONOSCOPY;  Surgeon: Jc Koo MD;  Location: [facility] (4TH FLR);  Service: Endoscopy;  Laterality: N/A;  COVID screening on 6/13/20 at [facility]  pt updated on drop off location and no visitor policy-    EPIDURAL STEROID INJECTION N/A 07/24/2023    Procedure: INJECTION, STEROID, EPIDURAL, L5/S1 SOONER DATE;  Surgeon: Israel Hurtado MD;  Location: [facility];  Service: Pain Management;  Laterality: N/A;    ESOPHAGOGASTRODUODENOSCOPY N/A 03/27/2019    Procedure: EGD (ESOPHAGOGASTRODUODENOSCOPY);  Surgeon: Robbin Bar MD;  Location: [facility] (2ND FLR);  Service: Endoscopy;  Laterality: N/A;  BMI 61.3/2nd floor case/svn    INJECTION OF JOINT Bilateral 06/09/2020    Procedure: INJECTION, JOINT, BILATERAL SI;  Surgeon: Lina Wagner MD;  [facility];   Service: Pain Management;  Laterality: Bilateral;  B/L SI Joint Injection    INJECTION OF JOINT Bilateral 05/11/2021    Procedure: INJECTION, JOINT, SACROILIAC (SI) need consent;  Surgeon: Lina Wagner MD;  Location: [facility]IN [facility]GT;  Service: Pain Management;  Laterality: Bilateral;    INJECTION OF JOINT Bilateral 5/16/2024    Procedure: INJECTION, JOINT BILATERAL SI AND GTB;  Surgeon: Israel Hurtado MD;  Location: [facility];  Service: Pain Management;  Laterality: Bilateral;  [phone]  2 WK F/U BENJI    JOINT REPLACEMENT Bilateral     with 2 revisions on rt    KNEE SURGERY  03/2010    orthroscope    KNEE SURGERY  06/19/2014    left TKR    LAPAROSCOPIC SLEEVE GASTRECTOMY N/A 08/28/2019    Procedure: GASTRECTOMY, SLEEVE, LAPAROSCOPIC with intraop EGD;  Surgeon: Heriberto Clements MD;  Location: [facility] OR 2ND FLR;  Service: General;  Laterality: N/A;    PANNICULECTOMY N/A 06/14/2022    Procedure: PANNICULECTOMY;  Surgeon: Rhett Gray MD;  Location: [facility] OR 2ND FLR;  Service: Plastics;  Laterality: N/A;    RADIOFREQUENCY ABLATION Right 07/09/2019    Procedure: RADIOFREQUENCY ABLATION;  Surgeon: Lina Wagner MD;  Location: [facility];  Service: Pain Management;  Laterality: Right;  RIGHT RFA L2,3,4,5  2 of 2    RADIOFREQUENCY ABLATION Left 12/19/2019    Procedure: RADIOFREQUENCY ABLATION, LEFT L2-L3-L4-L5 MEDIAL BRANCH 1 OF 2;  Surgeon: Lina Wagner MD;  Location: [facility];  Service: Pain Management;  Laterality: Left;    RADIOFREQUENCY ABLATION Right 12/31/2019    Procedure: RADIOFREQUENCY ABLATION, RIGHT L2-L3-L4-L5  2 OF 2;  Surgeon: Lina Wagner MD;  Location: [facility];  Service: Pain Management;  Laterality: Right;    RADIOFREQUENCY ABLATION Right 10/13/2020    Procedure: RADIOFREQUENCY ABLATION, Genicular Cooled 1 of 2;  Surgeon: Lina Wagner MD;  Location: [facility];  Service: Pain Management;  Laterality: Right;    RADIOFREQUENCY ABLATION Left  11/03/2020    Procedure: RADIOFREQUENCY ABLATION, GENICULAR COOLED  2 OF 2;  Surgeon: Lina Wagner MD;  Location: [facility]GT;  Service: Pain Management;  Laterality: Left;    RADIOFREQUENCY ABLATION Left 12/15/2020    Procedure: RADIOFREQUENCY ABLATION LEFT L2,3,4,5 1 of 2;  Surgeon: Lina Wagner MD;  Location: [facility]IN [facility]GT;  Service: Pain Management;  Laterality: Left;    RADIOFREQUENCY ABLATION Right 12/29/2020    Procedure: RADIOFREQUENCY ABLATION RIGHT L2,3,4,5 2 of 2;  Surgeon: Lina Wagner MD;  Location: [facility]GT;  Service: Pain Management;  Laterality: Right;    RADIOFREQUENCY ABLATION Left 06/29/2021    Procedure: RADIOFREQUENCY ABLATION GENICULAR COOLED;  Surgeon: Lina Wagner MD;  Location: [facility];  Service: Pain Management;  Laterality: Left;  1 of 2    RADIOFREQUENCY ABLATION Right 07/13/2021    Procedure: RADIOFREQUENCY ABLATION GENICULAR;  Surgeon: Lina Wagner MD;  Location: [facility]IN [facility]GT;  Service: Pain Management;  Laterality: Right;  2 of 2    RADIOFREQUENCY ABLATION Right 08/24/2021    Procedure: RADIOFREQUENCY ABLATION, L2-L3-L4-L5 MEDIAL BRANCH 1 OF 2;  Surgeon: Lina Wagner MD;  Location: [facility]IN [facility]GT;  Service: Pain Management;  Laterality: Right;    RADIOFREQUENCY ABLATION Left 09/11/2021    Procedure: RADIOFREQUENCY ABLATION, L2-L3-L4-L5 MEDIAL BR ANCH 2 OF 2;  Surgeon: Lina Wagner MD;  Location: [facility]GT;  Service: Pain Management;  Laterality: Left;    RADIOFREQUENCY ABLATION Right 03/07/2022    Procedure: RADIOFREQUENCY ABLATION RIGHT L3,4,5 1 of 2, needs consent;  Surgeon: Israel Hurtado MD;  Location: [facility];  Service: Pain Management;  Laterality: Right;    RADIOFREQUENCY ABLATION Left 03/21/2022    Procedure: RADIOFREQUENCY ABLATION RIGHT L3,4,5 2 of 2, needs consent;  Surgeon: Israel Hurtado MD;  Location: [facility]IN [facility]GT;  Service: Pain Management;  Laterality: Left;    RADIOFREQUENCY ABLATION Right 05/09/2022     Procedure: RADIOFREQUENCY ABLATION RIGHT GENICULAR COOLED 1 of 2;  Surgeon: Israel Hurtado MD;  Location: [facility] PAIN MGT;  Service: Pain Management;  Laterality: Right;    RADIOFREQUENCY ABLATION Left 05/23/2022    Procedure: RADIOFREQUENCY ABLATION LEFT GENICULAR COOLED  2 of 2;  Surgeon: Israel Hurtado MD;  Location: [facility]IN [facility];  Service: Pain Management;  Laterality: Left;    RADIOFREQUENCY ABLATION Right 12/12/2022    Procedure: RADIOFREQUENCY ABLATION RIGHT GENICULAR COOLED  ONE OF TWO  NEEDS CONSENT;  Surgeon: Israel Hurtado MD;  Location: [facility];  Service: Pain Management;  Laterality: Right;    RADIOFREQUENCY ABLATION Left 01/09/2023    Procedure: RADIOFREQUENCY ABLATION LEFT GENICULAR COOLED  TWO OF TWO NEEDS CONSENT;  Surgeon: Israel Hurtado MD;  Location: [facility];  Service: Pain Management;  Laterality: Left;    RADIOFREQUENCY ABLATION Right 03/06/2023    Procedure: RADIOFREQUENCY ABLATION RIGHT L3,L4,L5;  Surgeon: Israel Hurtado MD;  Location: [facility]IN [facility]GT;  Service: Pain Management;  Laterality: Right;    RADIOFREQUENCY ABLATION Left 05/22/2023    Procedure: RADIOFREQUENCY ABLATION LEFT L3,L4,L5;  Surgeon: Israel Hurtado MD;  Location: [facility]IN [facility]GT;  Service: Pain Management;  Laterality: Left;  4/3 LM TO R/S    RADIOFREQUENCY ABLATION Right 11/27/2023    Procedure: RADIOFREQUENCY ABLATION RIGHT L3, 4, 5 1 OF 3;  Surgeon: Israel Hurtado MD;  Location: [facility];  Service: Pain Management;  Laterality: Right;    RADIOFREQUENCY ABLATION Right 01/22/2024    Procedure: RADIOFREQUENCY ABLATION RIGHT GENICULAR 3 NERVES 3 OF 3;  Surgeon: Israel Hurtado MD;  Location: [facility]IN [facility]GT;  Service: Pain Management;  Laterality: Right;    RADIOFREQUENCY ABLATION Left 02/12/2024    Procedure: RADIOFREQUENCY ABLATION LEFT L3, 4, 5;  Surgeon: Israel Hurtado MD;  Location: [facility]IN [facility]GT;  Service: Pain Management;  Laterality: Left;  [phone]    RADIOFREQUENCY ABLATION OF LUMBAR MEDIAL BRANCH NERVE AT SINGLE  LEVEL Left 06/26/2018    Procedure: RADIOFREQUENCY ABLATION, NERVE, MEDIAL BRANCH, LUMBAR, 1 LEVEL;  Surgeon: Lina Wagner MD;  Location: [facility] [place]IN MGT;  Service: Pain Management;  Laterality: Left;  Left Cooled RFA @ L2,3,4,5  [phone]  with Sedation    1 of 2    RADIOFREQUENCY ABLATION OF LUMBAR MEDIAL BRANCH NERVE AT SINGLE LEVEL Right 07/10/2018    Procedure: RADIOFREQUENCY ABLATION, NERVE, MEDIAL BRANCH, LUMBAR, 1 LEVEL;  Surgeon: Lina Wagner MD;  Location: [facility] [place]IN [facility]GT;  Service: Pain Management;  Laterality: Right;  RIght Cooled RFA @ L2,3,4,5  [phone] with Sedation    2 of 2    REVISION OF KNEE ARTHROPLASTY Right 7/18/2024    Procedure: REVISION, ARTHROPLASTY, KNEE: RIGHT;  Surgeon: Theodore Swan MD;  Location: [facility] OR 2ND FLR;  Service: Orthopedics;  Laterality: Right;    TRIGGER FINGER RELEASE Right 2017    TRIGGER FINGER RELEASE Left 07/29/2019    Procedure: RELEASE, TRIGGER FINGER, Left Thumb;  Surgeon: Velma Garcia MD;  Location: [facility] [place];  Service: Orthopedics;  Laterality: Left;  Local w/ MAC       Review of patient's allergies indicates:   Allergen Reactions    Strawberry Anaphylaxis       Family History       Problem Relation (Age of Onset)    Cancer Sister    Cataracts Mother, Father    Coronary artery disease Brother    Diabetes Mother, Father, Sister, Brother, Brother    Hypertension Sister    Hypotension Brother, Brother    Kidney failure Brother    No Known Problems Sister          Tobacco Use    Smoking status: Never    Smokeless tobacco: Never   Substance and Sexual Activity    Alcohol use: Not Currently     Comment: occasionally     Drug use: No    Sexual activity: Yes     Partners: Female     Birth control/protection: None      Review of Systems   Musculoskeletal:         Knee pain   All other systems reviewed and are negative.    Objective:     Vital Signs (Most Recent):  Temp: 98 °F (36.7 °C) (07/19/24 2315)  Pulse: 64 (07/19/24 2258)  Resp: 16  (07/19/24 2320)  BP: (!) 85/49 (07/20/24 0230)  SpO2: 97 % (07/19/24 2320) Vital Signs (24h Range):  Temp:  [97.4 °F (36.3 °C)-98 °F (36.7 °C)] 98 °F (36.7 °C)  Pulse:  [50-67] 64  Resp:  [14-21] 16  SpO2:  [95 %-100 %] 97 %  BP: (61-96)/(37-70) 85/49   Weight: 116 kg (255 lb 11.7 oz)  Body mass index is 42.56 kg/m².      Intake/Output Summary (Last 24 hours) at 7/20/2024 0303  Last data filed at 7/19/2024 2233  Gross per 24 hour   Intake 4552.69 ml   Output 500 ml   Net 4052.69 ml          Physical Exam  Vitals reviewed.   Constitutional:       General: She is not in acute distress.     Appearance: She is obese. She is not ill-appearing or toxic-appearing.   HENT:      Head: Normocephalic.      Nose: Nose normal.      Mouth/Throat:      Mouth: Mucous membranes are moist.   Eyes:      Extraocular Movements: Extraocular movements intact.      Pupils: Pupils are equal, round, and reactive to light.   Cardiovascular:      Rate and Rhythm: Normal rate and regular rhythm.      Pulses: Normal pulses.      Heart sounds: Normal heart sounds. No murmur heard.  Pulmonary:      Effort: Pulmonary effort is normal.      Breath sounds: Normal breath sounds.   Abdominal:      General: Abdomen is flat. Bowel sounds are normal.      Palpations: Abdomen is soft.   Musculoskeletal:      Comments: Knee wound dressed   Skin:     General: Skin is warm and dry.      Capillary Refill: Capillary refill takes less than 2 seconds.      Coloration: Skin is not pale.   Neurological:      Mental Status: She is alert.            Vents:     Lines/Drains/Airways       Epidural Line  Duration                  Neuraxial Analgesia/Anesthesia Assessment (using dermatomes) Epidural 07/18/24 1310 1 day         Perineural Analgesia/Anesthesia Assessment (Motor Function-Bromage) 07/18/24 1245 1 day              Peripheral Intravenous Line  Duration                  Peripheral IV - Single Lumen 07/18/24 1230 20 G Anterior;Left;Proximal Forearm 1 day                   Significant Labs:    CBC/Anemia Profile:  Recent Labs   Lab 07/19/24  1343 07/19/24  2305 07/19/24  2309   WBC 11.36  --  7.12   HGB 11.3*  --  10.1*   HCT 35.3* 30* 33.0*     --  165   *  --  102*   RDW 15.0*  --  15.0*        Chemistries:  Recent Labs   Lab 07/19/24  0307 07/19/24  2309    140   K 4.5 4.1   * 114*   CO2 23 19*   BUN 18 26*   CREATININE 0.8 0.9   CALCIUM 8.4* 7.8*   ALBUMIN 3.0*  --    PROT 6.2  --    BILITOT 0.5  --    ALKPHOS 53*  --    ALT 21  --    AST 20  --    MG  --  1.9       All pertinent labs within the past 24 hours have been reviewed.    Significant Imaging: I have reviewed all pertinent imaging results/findings within the past 24 hours.

## 2024-07-20 NOTE — CARE UPDATE
"RAPID RESPONSE NURSE CHART REVIEW        Chart Reviewed: 07/20/2024, 9:17 AM    MRN: [id number]  Bed: 502/502 A    Dx: Failed total right knee replacement    Alivia Thomas has a past medical history of Acute right MCA stroke, Allergy, Anemia, Asthma, Bilateral shoulder bursitis, Cervical stenosis of spine, Chronic pain, DDD (degenerative disc disease), cervical, Depression, Diabetes mellitus type II, DJD (degenerative joint disease) of knee, Facet arthritis of lumbar region, Facet syndrome, GERD (gastroesophageal reflux disease), Heartburn, Hyperlipidemia, Hypertension, Morbid obesity, Neuromuscular disorder, PADDY (obstructive sleep apnea), Paroxysmal atrial fibrillation, Proteinuria, Right carpal tunnel syndrome, Sacroiliitis, Sacroiliitis, Sleep apnea, and Uterine fibroid.    Last VS: BP (!) 99/52 (BP Location: Right arm, Patient Position: Lying)   Pulse 63   Temp 99.1 °F (37.3 °C) (Oral)   Resp 17   Ht 5' 5" (1.651 m)   Wt 116 kg (255 lb 11.7 oz)   LMP 06/26/2018   SpO2 (!) 94%   Breastfeeding No   BMI 42.56 kg/m²     24H Vital Sign Range:  Temp:  [97.4 °F (36.3 °C)-99.1 °F (37.3 °C)]   Pulse:  [50-69]   Resp:  [16-21]   BP: (61-99)/(37-70)   SpO2:  [94 %-100 %]     Level of Consciousness (AVPU): alert    Recent Labs     07/19/24  1343 07/19/24  2305 07/19/24  2309 07/20/24  0557   WBC 11.36  --  7.12 7.43   HGB 11.3*  --  10.1* 10.5*   HCT 35.3* 30* 33.0* 33.5*     --  165 171       Recent Labs     07/19/24  0307 07/19/24  2309 07/20/24  0557    140 141   K 4.5 4.1 4.5   * 114* 113*   CO2 23 19* 23   BUN 18 26* 20   CREATININE 0.8 0.9 0.8   * 150* 89   PHOS  --   --  2.9   MG  --  1.9 1.9        Recent Labs     07/19/24  2305 07/20/24  0609   PH 7.263* 7.264*   PCO2 47.7* 49.5*   PO2 48 33*   HCO3 21.6* 22.4*   POCSATURATED 77 54   BE -5* -5*      MEWS score: 2    Rounding completed with charge DARIUSZ Hinton reports Pt BP remained soft overnight, PO midodrine started.  BP higher " but remains soft, team is monitoring and pt asymptomatic. No additional concerns verbalized at this time. Instructed to call [phone] for further concerns or assistance.    Williams Chaves RN

## 2024-07-20 NOTE — PLAN OF CARE
Seen by gen med consults today, will follow tomorrow with ortho staff comanagement team which typically follows joint patients in hospital if needed. Discussed with Dr Garcia who saw patient today and added to med team IM-K and med admit line updated

## 2024-07-20 NOTE — ASSESSMENT & PLAN NOTE
S/p revision on 7/29, developed some post procedural hypercapnia and soft blood pressures without signs of end organ hypoperfusion.   - Given history of PADDY and possibly component of obesity hypoventilation syndrome, recommend careful dosing of opioids and other sedating meds.

## 2024-07-20 NOTE — NURSING
"Pt hypotensive during night, blood pressures w/map charted. Pt mostly asymptomatic but c/o being "sleepy" and slightly lethargic. Ortho on-call and rapid came to pt bedside to assess. Labs, chest x-ray, and EKG done. Bp remains hypotensive.     Critical care consult placed, MICU MD came to bedside to assess. Labs and bedside echo performed. Midodrine ordered to help with BP. Goal of MAP >60, instructed to notify if any changes in symptoms or MAP below 60.    0452: Pt remains sleepy but arousable. Automatic vitals machine set to cycle BP Q15min. HOB elevated and rounding done. Pt AAOx4, answers all questions appropriately, extremity movement WNL. Rapid to come to bedside to draw AM blood gas labs. BG monitored, PNC infusing to manage pain.      Vitals 0437: 98.1 temp, 69 HR, 98% 3L NC, 97/55 map 71  "

## 2024-07-20 NOTE — CONSULTS
[facility] - Surgery  Hospital Medicine  Consult Note    Patient Name: lAivia Thomas  MRN: [id number]  Admission Date: 7/18/2024  Hospital Length of Stay: 2 days  Attending Physician: Theodore Swan MD   Primary Care Provider: Clarisse Richardson MD           Patient information was obtained from patient, past medical records, and ER records.     Inpatient consult to Hospital Medicine-General  Consult performed by: Morro Szymanski MD  Consult ordered by: Chung Monroe MD        Subjective:     Principal Problem: Failed total right knee replacement    Chief Complaint: No chief complaint on file.       HPI: Alivia Thomas is a 58 year old woman with history of R MCA ischemic stroke (2024 s/p TnK with no residual deficits), T2DM, PADDY, BMI > 40 s/p gastric sleeve, and chronic back and knee pain, who presents after revision of R TKA on 7/18/24. Patient with prior R TKA done around 2009/2010, with revision in 2014, however now with continued and progressively worsening pain limiting her daily activities. Underwent evaluation for revision of Right knee prosthetic and is s/p revision on 7/18. Operative findings included osteolysis on mainly in the lateral femoral condyle with pitting wear of the tibial insert. No complications during the case. The following day, on 7/19, patient noted to be persistently hypotensive, with charted BP as low as 61/37. She received approximately 4.7L IVF through 2L boluses and continuous mIVF running at 125 cc/hr from the prior day. Patient with only modest improvement in BP up to 96/50. Despite persistent hypotension, patient remained largely asymptomatic, though reported feeling sleepy at times. MICU was consulted and felt that as patient was asymptomatic and with no signs of end-organ damage; normal lactates, normal Cr, and negative troponin. VBG with pH 7.26, pCO2 47, bicarb 21.6. CXR unremarkable. Denied any chest pain, SOB, abdominal pain, fevers, chills/rigors, or  dysuria.    Past Medical History:   Diagnosis Date    Acute right MCA stroke 01/04/2024    Allergy     Anemia     Asthma     Bilateral shoulder bursitis     Cervical stenosis of spine     Chronic pain     DDD (degenerative disc disease), cervical     Depression 01/28/2019    Diabetes mellitus type II     DJD (degenerative joint disease) of knee 06/19/2014    Facet arthritis of lumbar region 12/17/2015    Facet syndrome 12/17/2015    GERD (gastroesophageal reflux disease)     Heartburn     Hyperlipidemia     Hypertension     Morbid obesity     Neuromuscular disorder     PADDY (obstructive sleep apnea)     Paroxysmal atrial fibrillation 03/19/2024    Proteinuria     Right carpal tunnel syndrome     Sacroiliitis 06/13/2018    Sacroiliitis     Sleep apnea     Uterine fibroid        Past Surgical History:   Procedure Laterality Date    CARPAL TUNNEL RELEASE      CARPAL TUNNEL RELEASE  1980s    left    CARPAL TUNNEL RELEASE  2012    right    CATARACT EXTRACTION W/  INTRAOCULAR LENS IMPLANT Left 08/08/2023    DR. STOKES    CATARACT EXTRACTION W/  INTRAOCULAR LENS IMPLANT Right 08/29/2023        COLONOSCOPY N/A 06/15/2020    Procedure: COLONOSCOPY;  Surgeon: Jc Koo MD;  Location: [facility] (4TH FLR);  Service: Endoscopy;  Laterality: N/A;  COVID screening on 6/13/20 at [facility]  pt updated on drop off location and no visitor [facility]    EPIDURAL STEROID INJECTION N/A 07/24/2023    Procedure: INJECTION, STEROID, EPIDURAL, L5/S1 SOONER DATE;  Surgeon: Israel Hurtado MD;  [facility];  Service: Pain Management;  Laterality: N/A;    ESOPHAGOGASTRODUODENOSCOPY N/A 03/27/2019    Procedure: EGD (ESOPHAGOGASTRODUODENOSCOPY);  Surgeon: Robbin Bar MD;  Location: [facility] (2ND FLR);  Service: Endoscopy;  Laterality: N/A;  BMI 61.3/2nd floor case/svn    INJECTION OF JOINT Bilateral 06/09/2020    Procedure: INJECTION, JOINT, BILATERAL SI;  Surgeon: Lina Wagner MD;  Location: [facility]  PAIN MGT;  Service: Pain Management;  Laterality: Bilateral;  B/L SI Joint Injection    INJECTION OF JOINT Bilateral 05/11/2021    Procedure: INJECTION, JOINT, SACROILIAC (SI) need consent;  Surgeon: Lina Wagner MD;  Location: [facility] PAIN MGT;  Service: Pain Management;  Laterality: Bilateral;    INJECTION OF JOINT Bilateral 5/16/2024    Procedure: INJECTION, JOINT BILATERAL SI AND GTB;  Surgeon: Israel Hurtado MD;  Location: [facility] PAIN MGT;  Service: Pain Management;  Laterality: Bilateral;  [phone]  2 WK F/U BENJI    JOINT REPLACEMENT Bilateral     with 2 revisions on rt    KNEE SURGERY  03/2010    orthroscope    KNEE SURGERY  06/19/2014    left TKR    LAPAROSCOPIC SLEEVE GASTRECTOMY N/A 08/28/2019    Procedure: GASTRECTOMY, SLEEVE, LAPAROSCOPIC with intraop EGD;  Surgeon: Heriberto Clements MD;  Location: [facility] OR 2ND FLR;  Service: General;  Laterality: N/A;    PANNICULECTOMY N/A 06/14/2022    Procedure: PANNICULECTOMY;  Surgeon: Rhett Gray MD;  Location: [facility] OR 2ND FLR;  Service: Plastics;  Laterality: N/A;    RADIOFREQUENCY ABLATION Right 07/09/2019    Procedure: RADIOFREQUENCY ABLATION;  Surgeon: Lina Wagner MD;  Location: [facility] PAIN MGT;  Service: Pain Management;  Laterality: Right;  RIGHT RFA L2,3,4,5  2 of 2    RADIOFREQUENCY ABLATION Left 12/19/2019    Procedure: RADIOFREQUENCY ABLATION, LEFT L2-L3-L4-L5 MEDIAL BRANCH 1 OF 2;  Surgeon: Lina Wagner MD;  Location: [facility] PAIN MGT;  Service: Pain Management;  Laterality: Left;    RADIOFREQUENCY ABLATION Right 12/31/2019    Procedure: RADIOFREQUENCY ABLATION, RIGHT L2-L3-L4-L5  2 OF 2;  Surgeon: Lina Wagner MD;  Location: [facility] PAIN MGT;  Service: Pain Management;  Laterality: Right;    RADIOFREQUENCY ABLATION Right 10/13/2020    Procedure: RADIOFREQUENCY ABLATION, Genicular Cooled 1 of 2;  Surgeon: Lina Wagner MD;  Location: [facility] PAIN MGT;  Service: Pain Management;  Laterality: Right;    RADIOFREQUENCY  ABLATION Left 11/03/2020    Procedure: RADIOFREQUENCY ABLATION, GENICULAR COOLED  2 OF 2;  Surgeon: Lina Wagner MD;  Location: [facility] PAIN MGT;  Service: Pain Management;  Laterality: Left;    RADIOFREQUENCY ABLATION Left 12/15/2020    Procedure: RADIOFREQUENCY ABLATION LEFT L2,3,4,5 1 of 2;  Surgeon: Lina Wagner MD;  Location: [facility] PAIN MGT;  Service: Pain Management;  Laterality: Left;    RADIOFREQUENCY ABLATION Right 12/29/2020    Procedure: RADIOFREQUENCY ABLATION RIGHT L2,3,4,5 2 of 2;  Surgeon: Lina Wagner MD;  Location: [facility] PAIN MGT;  Service: Pain Management;  Laterality: Right;    RADIOFREQUENCY ABLATION Left 06/29/2021    Procedure: RADIOFREQUENCY ABLATION GENICULAR COOLED;  Surgeon: Lina Wagner MD;  Location: [facility] PAIN MGT;  Service: Pain Management;  Laterality: Left;  1 of 2    RADIOFREQUENCY ABLATION Right 07/13/2021    Procedure: RADIOFREQUENCY ABLATION GENICULAR;  Surgeon: Lina Wagner MD;  Location: [facility] PAIN MGT;  Service: Pain Management;  Laterality: Right;  2 of 2    RADIOFREQUENCY ABLATION Right 08/24/2021    Procedure: RADIOFREQUENCY ABLATION, L2-L3-L4-L5 MEDIAL BRANCH 1 OF 2;  Surgeon: Lina Wagner MD;  Location: [facility] PAIN MGT;  Service: Pain Management;  Laterality: Right;    RADIOFREQUENCY ABLATION Left 09/11/2021    Procedure: RADIOFREQUENCY ABLATION, L2-L3-L4-L5 MEDIAL BR ANCH 2 OF 2;  Surgeon: Lina Wagner MD;  Location: [facility] PAIN MGT;  Service: Pain Management;  Laterality: Left;    RADIOFREQUENCY ABLATION Right 03/07/2022    Procedure: RADIOFREQUENCY ABLATION RIGHT L3,4,5 1 of 2, needs consent;  Surgeon: Israel Hurtado MD;  Location: [facility] PAIN MGT;  Service: Pain Management;  Laterality: Right;    RADIOFREQUENCY ABLATION Left 03/21/2022    Procedure: RADIOFREQUENCY ABLATION RIGHT L3,4,5 2 of 2, needs consent;  Surgeon: Israel Hurtado MD;  Location: [facility] PAIN MGT;  Service: Pain Management;  Laterality: Left;    RADIOFREQUENCY ABLATION Right  05/09/2022    Procedure: RADIOFREQUENCY ABLATION RIGHT GENICULAR COOLED 1 of 2;  Surgeon: Israel Hurtado MD;  Location: [facility] PAIN MGT;  Service: Pain Management;  Laterality: Right;    RADIOFREQUENCY ABLATION Left 05/23/2022    Procedure: RADIOFREQUENCY ABLATION LEFT GENICULAR COOLED  2 of 2;  Surgeon: Israel Hurtado MD;  Location: [facility] PAIN MGT;  Service: Pain Management;  Laterality: Left;    RADIOFREQUENCY ABLATION Right 12/12/2022    Procedure: RADIOFREQUENCY ABLATION RIGHT GENICULAR COOLED  ONE OF TWO  NEEDS CONSENT;  Surgeon: Israel Hurtado MD;  Location: [facility] PAIN MGT;  Service: Pain Management;  Laterality: Right;    RADIOFREQUENCY ABLATION Left 01/09/2023    Procedure: RADIOFREQUENCY ABLATION LEFT GENICULAR COOLED  TWO OF TWO NEEDS CONSENT;  Surgeon: Israel Hurtado MD;  Location: [facility] PAIN MGT;  Service: Pain Management;  Laterality: Left;    RADIOFREQUENCY ABLATION Right 03/06/2023    Procedure: RADIOFREQUENCY ABLATION RIGHT L3,L4,L5;  Surgeon: Israel Hurtado MD;  Location: [facility] PAIN MGT;  Service: Pain Management;  Laterality: Right;    RADIOFREQUENCY ABLATION Left 05/22/2023    Procedure: RADIOFREQUENCY ABLATION LEFT L3,L4,L5;  Surgeon: Israel Hurtado MD;  Location: [facility] PAIN MGT;  Service: Pain Management;  Laterality: Left;  4/3 LM TO R/S    RADIOFREQUENCY ABLATION Right 11/27/2023    Procedure: RADIOFREQUENCY ABLATION RIGHT L3, 4, 5 1 OF 3;  Surgeon: Israel Hurtado MD;  Location: [facility] PAIN MGT;  Service: Pain Management;  Laterality: Right;    RADIOFREQUENCY ABLATION Right 01/22/2024    Procedure: RADIOFREQUENCY ABLATION RIGHT GENICULAR 3 NERVES 3 OF 3;  Surgeon: Israel Hurtado MD;  Location: [facility] PAIN MGT;  Service: Pain Management;  Laterality: Right;    RADIOFREQUENCY ABLATION Left 02/12/2024    Procedure: RADIOFREQUENCY ABLATION LEFT L3, 4, 5;  Surgeon: Israel Hurtado MD;  Location: [facility] PAIN MGT;  Service: Pain Management;  Laterality: Left;  [phone]    RADIOFREQUENCY ABLATION OF LUMBAR MEDIAL BRANCH NERVE  AT SINGLE LEVEL Left 06/26/2018    Procedure: RADIOFREQUENCY ABLATION, NERVE, MEDIAL BRANCH, LUMBAR, 1 LEVEL;  Surgeon: Lina Wagner MD;  Location: [facility] PAIN MGT;  Service: Pain Management;  Laterality: Left;  Left Cooled RFA @ L2,3,4,5  [phone]  with Sedation    1 of 2    RADIOFREQUENCY ABLATION OF LUMBAR MEDIAL BRANCH NERVE AT SINGLE LEVEL Right 07/10/2018    Procedure: RADIOFREQUENCY ABLATION, NERVE, MEDIAL BRANCH, LUMBAR, 1 LEVEL;  Surgeon: Lina Wagner MD;  Location: [facility] PAIN MGT;  Service: Pain Management;  Laterality: Right;  RIght Cooled RFA @ L2,3,4,5  [phone] with Sedation    2 of 2    REVISION OF KNEE ARTHROPLASTY Right 7/18/2024    Procedure: REVISION, ARTHROPLASTY, KNEE: RIGHT;  Surgeon: Theodore Swan MD;  Location: [facility] OR 2ND FLR;  Service: Orthopedics;  Laterality: Right;    TRIGGER FINGER RELEASE Right 2017    TRIGGER FINGER RELEASE Left 07/29/2019    Procedure: RELEASE, TRIGGER FINGER, Left Thumb;  Surgeon: Velma Garcia MD;  Location: [facility] OR;  Service: Orthopedics;  Laterality: Left;  Local w/ MAC       Review of patient's allergies indicates:   Allergen Reactions    Strawberry Anaphylaxis       Current Facility-Administered Medications on File Prior to Encounter   Medication    0.9%  NaCl infusion    0.9%  NaCl infusion    0.9%  NaCl infusion     Current Outpatient Medications on File Prior to Encounter   Medication Sig    albuterol (VENTOLIN HFA) 90 mcg/actuation inhaler INHALE TWO PUFFS INTO LUNGS EVERY 4 TO 6 HOURS AS NEEDED FOR SHORTNESS OF BREATH AND FOR WHEEZING    allopurinoL (ZYLOPRIM) 300 MG tablet Take 1 tablet (300 mg total) by mouth 2 (two) times daily.    atorvastatin (LIPITOR) 80 MG tablet Take 1 tablet (80 mg total) by mouth once daily.    b complex vitamins tablet Take 1 tablet by mouth every other day.     calcium citrate/vitamin D2 (ISIAH-CITRATE ORAL) Take 500 mg by mouth once daily.    colchicine (MITIGARE) 0.6 mg Cap One daily as needed for  gout flare (Patient taking differently: One daily)    cyanocobalamin (VITAMIN B-12) 1000 MCG tablet Take 100 mcg by mouth every morning.    diclofenac sodium (VOLTAREN) 1 % Gel Apply 2 g topically 4 (four) times daily as needed. To painful area on the feet.    fluticasone propionate (FLONASE) 50 mcg/actuation nasal spray 1 SPRAY BY EACH NOSTRIL ROUTE 2 TIMES DAILY AS NEEDED FOR RHINITIS.    LIDOcaine (XYLOCAINE) 5 % Oint ointment APPLY TOPICALLY AS NEEDED.    metoprolol succinate (TOPROL-XL) 25 MG 24 hr tablet Take 1 tablet (25 mg total) by mouth once daily.    MULTIVIT-IRON-MIN-FOLIC ACID 3,500-18-0.4 UNIT-MG-MG ORAL CHEW Take 1 tablet by mouth 2 (two) times daily.     nystatin (MYCOSTATIN) powder Apply topically 2 (two) times daily.    oxybutynin (DITROPAN-XL) 5 MG TR24 Take 1 tablet (5 mg total) by mouth once daily.    prednisoLONE acetate (PRED FORTE) 1 % DrpS Place 1 drop into the right eye 3 (three) times daily.    urea (CARMOL) 40 % Crea Apply topically once daily. To dry skin on the feet.    vitamin D 185 MG Tab Take 5,000 mg by mouth every morning.     apixaban (ELIQUIS) 5 mg Tab Take 1 tablet (5 mg total) by mouth 2 (two) times daily.    FLUoxetine 40 MG capsule Take 1 capsule (40 mg total) by mouth once daily.    semaglutide (OZEMPIC) 1 mg/dose (4 mg/3 mL) Inject 1 mg into the skin every 7 days.     Family History       Problem Relation (Age of Onset)    Cancer Sister    Cataracts Mother, Father    Coronary artery disease Brother    Diabetes Mother, Father, Sister, Brother, Brother    Hypertension Sister    Hypotension Brother, Brother    Kidney failure Brother    No Known Problems Sister          Tobacco Use    Smoking status: Never    Smokeless tobacco: Never   Substance and Sexual Activity    Alcohol use: Not Currently     Comment: occasionally     Drug use: No    Sexual activity: Yes     Partners: Female     Birth control/protection: None     Review of Systems   Constitutional:  Negative for chills  and fever.   HENT:  Negative for congestion and sore throat.    Eyes:  Negative for photophobia and visual disturbance.   Respiratory:  Negative for cough and shortness of breath.    Cardiovascular:  Negative for chest pain and palpitations.   Gastrointestinal:  Negative for abdominal pain and diarrhea.   Genitourinary:  Negative for dysuria and hematuria.   Musculoskeletal:  Positive for arthralgias and back pain. Negative for gait problem.   Skin:  Negative for rash and wound.   Neurological:  Negative for syncope and headaches.   Psychiatric/Behavioral:  Negative for agitation and behavioral problems.      Objective:     Vital Signs (Most Recent):  Temp: 99.1 °F (37.3 °C) (07/20/24 0730)  Pulse: 63 (07/20/24 0730)  Resp: 17 (07/20/24 0730)  BP: (!) 99/52 (07/20/24 0730)  SpO2: (!) 94 % (07/20/24 0730) Vital Signs (24h Range):  Temp:  [97.4 °F (36.3 °C)-99.1 °F (37.3 °C)] 99.1 °F (37.3 °C)  Pulse:  [50-69] 63  Resp:  [16-21] 17  SpO2:  [94 %-100 %] 94 %  BP: (61-99)/(37-70) 99/52     Weight: 116 kg (255 lb 11.7 oz)  Body mass index is 42.56 kg/m².     Physical Exam  Vitals reviewed.   Constitutional:       General: She is not in acute distress.     Appearance: Normal appearance. She is obese.   HENT:      Head: Normocephalic and atraumatic.      Nose: No congestion or rhinorrhea.      Comments: NC in place     Mouth/Throat:      Mouth: Mucous membranes are moist.      Pharynx: Oropharynx is clear.   Eyes:      Extraocular Movements: Extraocular movements intact.      Pupils: Pupils are equal, round, and reactive to light.   Cardiovascular:      Rate and Rhythm: Normal rate and regular rhythm.      Pulses: Normal pulses.      Heart sounds: Normal heart sounds.   Pulmonary:      Effort: Pulmonary effort is normal. No respiratory distress.      Breath sounds: Normal breath sounds.   Abdominal:      General: Bowel sounds are normal.      Palpations: Abdomen is soft.      Tenderness: There is no abdominal tenderness.    Musculoskeletal:         General: No swelling or tenderness.      Comments: R knee in brace  Knee pain   Skin:     Findings: No bruising or rash.   Neurological:      General: No focal deficit present.      Mental Status: She is alert and oriented to person, place, and time.   Psychiatric:         Mood and Affect: Mood normal.         Behavior: Behavior normal.          Significant Labs: All pertinent labs within the past 24 hours have been reviewed.    Significant Imaging: I have reviewed all pertinent imaging results/findings within the past 24 hours.  Assessment/Plan:     * Failed total right knee replacement  S/p R TKA Revision on 7/18/24.    - Management per primary Orthopedics team  - Multimodal pain medication regimen ideally with less sedating analgesics in setting of hypotension      Postprocedural hypotension  58F w/hx of DJD of knees and back s/p R TKA revision on 7/18. Developed persistent hypotension on 7/19 that only modestly improved BP with borderline MAPs that persisted throughout the day. Initial labs without evidence of end organ hypoperfusion---serial lactates negative, Cr baseline, trop negative, and mentation is intact. MICU was consulted overnight and performed bedside echo that was without signs of decreased contractility or RV strain. On exam, pt perfusing extremities appropriately and no evidence of active bleeding. No other clinical signs to indicate infection with normal WBC and no fevers. Given the above, less concern for shock. Suspect hypotension is multifactorial with the following: procedural anesthesia, metoprolol XR given earlier on 7/18 while patient was already hypotensive, post-operative analgesia with opioid medications.    - Please hold Midodrine 10 mg this afternoon and continue to monitor BP off of midodrine.  - Continue to hold antihypertensives such as metoprolol at this time. If hypertensive, can resume metoprolol prior to discharge. Otherwise would hold on discharge  until follow up with PCP.  - Avoid sedating meds/opioids with use of multimodal agents for pain control.  - If persistently hypotensive again, would pursue infectious workup though no clinical signs of infection at this time.    History of right MCA stroke  Hx of stroke in 2024. TNK aborted R hemispheric ischemic stroke (MR-negative) 1/4/24.  Etiology likely PAF     - Continue Atorvastatin 80  - Continue Eliquis 5 BID    Paroxysmal atrial fibrillation  Dx with pAF after stroke in 2024.    - Continue Eliquis 5 BID    BMI 40.0-44.9, adult  Body mass index is 42.56 kg/m². Morbid obesity complicates all aspects of disease management from diagnostic modalities to treatment. Weight loss encouraged and health benefits explained to patient.         Hyperlipemia  Chronic, stable.    - Continue home statin      Primary hypertension  Chronic, controlled. Latest blood pressure and vitals reviewed-     Temp:  [97.4 °F (36.3 °C)-99.1 °F (37.3 °C)]   Pulse:  [50-69]   Resp:  [16-21]   BP: (61-99)/(37-70)   SpO2:  [94 %-100 %] .   Home meds for hypertension were reviewed and noted below.   Hypertension Medications               metoprolol succinate (TOPROL-XL) 25 MG 24 hr tablet Take 1 tablet (25 mg total) by mouth once daily.          - Holding all antihypertensives in setting of hypotension    PADDY (obstructive sleep apnea)  Chronic issue. Does not use Home CPAP as she feels claustrophobic. VBG with signs of respiratory acidosis with pH 7.26 and CO2 47.7.    - Discussed importance of using nightly CPAP.  - Avoid opioids/sedating meds if possible      VTE Risk Mitigation (From admission, onward)           Ordered     apixaban tablet 5 mg  2 times daily         07/18/24 1656     Place sequential compression device  Until discontinued         07/18/24 1653     Place foot compression device  Until discontinued         07/18/24 1122                        Thank you for your consult. I will follow-up with patient. Please contact us if  you have any additional questions.    Morro Szymanski MD  Department of Hospital Medicine   [facility] - Surgery

## 2024-07-20 NOTE — CARE UPDATE
RAPID RESPONSE NURSE FOLLOW-UP NOTE       Followed up with patient for proactive rounding.  No acute issues at this time.   Patient up on toilet with minimal stand-by assistance from bedside RN, awake and alert, denies any symptoms associated with hypotension. Last BP 97/55 (71)  Repeat labs pending  Reviewed plan of care with bedside RNNya .   Team will continue to follow.  Please call Rapid Response RNYNES RN with any questions or concerns at [phone].

## 2024-07-20 NOTE — CONSULTS
[facility]  Critical Care Medicine  Consult Note    Patient Name: Alivia Thomas  MRN: [id number]  Admission Date: 7/18/2024  Hospital Length of Stay: 2 days  Code Status: Prior  Attending Physician: Theodore Swan MD   Primary Care Provider: Clarisse Richardson MD   Principal Problem: Failed total right knee replacement    Inpatient consult to Critical Care Medicine  Consult performed by: Oriana Murrell MD  Consult ordered by: Chung Monroe MD        Subjective:     HPI:  Patient is a 59yo with history of t2dm, htn, afib on chronic eliquis who is s/p R knee arthroplasty revision on 7/19. A rapid was called due to borderline low blood pressures. She received IVF boluses and MAPs remained in the mid 60s. Blood gas obtained by primary showed mild mixed acidosis. Her lactate was within normal limits. Patient is answering all questions appropriately and no in any respiratory distress. Bedside echo showed visually normal LV function without overt signs of RV strain. IVC appeared full and somewhat collapsible with inspiration. Hgb mildly decreased from admission but no overt signs of uncontrolled bleed.   She had received some opiates earlier for post-op pain but no doses this evening.   Critical care medicine consulted for assistance with vital signs/ABG derangements.       Hospital/ICU Course:  No notes on file    Past Medical History:   Diagnosis Date    Acute right MCA stroke 01/04/2024    Allergy     Anemia     Asthma     Bilateral shoulder bursitis     Cervical stenosis of spine     Chronic pain     DDD (degenerative disc disease), cervical     Depression 01/28/2019    Diabetes mellitus type II     DJD (degenerative joint disease) of knee 06/19/2014    Facet arthritis of lumbar region 12/17/2015    Facet syndrome 12/17/2015    GERD (gastroesophageal reflux disease)     Heartburn     Hyperlipidemia     Hypertension     Morbid obesity     Neuromuscular disorder     PADDY (obstructive sleep  apnea)     Paroxysmal atrial fibrillation 03/19/2024    Proteinuria     Right carpal tunnel syndrome     Sacroiliitis 06/13/2018    Sacroiliitis     Sleep apnea     Uterine fibroid        Past Surgical History:   Procedure Laterality Date    CARPAL TUNNEL RELEASE      CARPAL TUNNEL RELEASE  1980s    left    CARPAL TUNNEL RELEASE  2012    right    CATARACT EXTRACTION W/  INTRAOCULAR LENS IMPLANT Left 08/08/2023    DR. STOKES    CATARACT EXTRACTION W/  INTRAOCULAR LENS IMPLANT Right 08/29/2023        COLONOSCOPY N/A 06/15/2020    Procedure: COLONOSCOPY;  Surgeon: Jc Koo MD;  Location: [facility] (4TH FLR);  Service: Endoscopy;  Laterality: N/A;  COVID screening on 6/13/20 at [facility]  pt updated on drop off location and no visitor [facility]    EPIDURAL STEROID INJECTION N/A 07/24/2023    Procedure: INJECTION, STEROID, EPIDURAL, L5/S1 SOONER DATE;  Surgeon: Israel Hurtado MD;  Location: [facility] [place] PAIN MGT;  Service: Pain Management;  Laterality: N/A;    ESOPHAGOGASTRODUODENOSCOPY N/A 03/27/2019    Procedure: EGD (ESOPHAGOGASTRODUODENOSCOPY);  Surgeon: Robbin Bar MD;  Location: [facility] (2ND FLR);  Service: Endoscopy;  Laterality: N/A;  BMI 61.3/2nd floor case/svn    INJECTION OF JOINT Bilateral 06/09/2020    Procedure: INJECTION, JOINT, BILATERAL SI;  Surgeon: Lnia Wagner MD;  Location: [facility] [place] PAIN MGT;  Service: Pain Management;  Laterality: Bilateral;  B/L SI Joint Injection    INJECTION OF JOINT Bilateral 05/11/2021    Procedure: INJECTION, JOINT, SACROILIAC (SI) need consent;  Surgeon: Lina Wagner MD;  Location: [facility] [place] PAIN MGT;  Service: Pain Management;  Laterality: Bilateral;    INJECTION OF JOINT Bilateral 5/16/2024    Procedure: INJECTION, JOINT BILATERAL SI AND GTB;  Surgeon: Israel Hurtado MD;  Location: [facility] [place] PAIN MGT;  Service: Pain Management;  Laterality: Bilateral;  [phone]  2 WK F/U BENJI    JOINT REPLACEMENT Bilateral     with 2 revisions on rt    KNEE  SURGERY  03/2010    orthroscope    KNEE SURGERY  06/19/2014    left TKR    LAPAROSCOPIC SLEEVE GASTRECTOMY N/A 08/28/2019    Procedure: GASTRECTOMY, SLEEVE, LAPAROSCOPIC with intraop EGD;  Surgeon: Heriberto Clements MD;  Location: [facility] OR 2ND FLR;  Service: General;  Laterality: N/A;    PANNICULECTOMY N/A 06/14/2022    Procedure: PANNICULECTOMY;  Surgeon: Rhett Gray MD;  Location: [facility] OR 2ND FLR;  Service: Plastics;  Laterality: N/A;    RADIOFREQUENCY ABLATION Right 07/09/2019    Procedure: RADIOFREQUENCY ABLATION;  Surgeon: Lina Wagner MD;  Location: [facility] PAIN MGT;  Service: Pain Management;  Laterality: Right;  RIGHT RFA L2,3,4,5  2 of 2    RADIOFREQUENCY ABLATION Left 12/19/2019    Procedure: RADIOFREQUENCY ABLATION, LEFT L2-L3-L4-L5 MEDIAL BRANCH 1 OF 2;  Surgeon: Lina Wagner MD;  Location: [facility] PAIN MGT;  Service: Pain Management;  Laterality: Left;    RADIOFREQUENCY ABLATION Right 12/31/2019    Procedure: RADIOFREQUENCY ABLATION, RIGHT L2-L3-L4-L5  2 OF 2;  Surgeon: Lina Wagner MD;  Location: [facility] PAIN MGT;  Service: Pain Management;  Laterality: Right;    RADIOFREQUENCY ABLATION Right 10/13/2020    Procedure: RADIOFREQUENCY ABLATION, Genicular Cooled 1 of 2;  Surgeon: Lina Wagner MD;  Location: [facility] PAIN MGT;  Service: Pain Management;  Laterality: Right;    RADIOFREQUENCY ABLATION Left 11/03/2020    Procedure: RADIOFREQUENCY ABLATION, GENICULAR COOLED  2 OF 2;  Surgeon: Lina Wagner MD;  Location: [facility] PAIN MGT;  Service: Pain Management;  Laterality: Left;    RADIOFREQUENCY ABLATION Left 12/15/2020    Procedure: RADIOFREQUENCY ABLATION LEFT L2,3,4,5 1 of 2;  Surgeon: Lina Wagner MD;  Location: [facility] PAIN MGT;  Service: Pain Management;  Laterality: Left;    RADIOFREQUENCY ABLATION Right 12/29/2020    Procedure: RADIOFREQUENCY ABLATION RIGHT L2,3,4,5 2 of 2;  Surgeon: Lina Wagner MD;  Location: [facility] PAIN MGT;  Service: Pain Management;   Laterality: Right;    RADIOFREQUENCY ABLATION Left 06/29/2021    Procedure: RADIOFREQUENCY ABLATION GENICULAR COOLED;  Surgeon: Lina Wagner MD;  Location: [facility] PAIN MGT;  Service: Pain Management;  Laterality: Left;  1 of 2    RADIOFREQUENCY ABLATION Right 07/13/2021    Procedure: RADIOFREQUENCY ABLATION GENICULAR;  Surgeon: Lina Wagner MD;  Location: [facility] PAIN MGT;  Service: Pain Management;  Laterality: Right;  2 of 2    RADIOFREQUENCY ABLATION Right 08/24/2021    Procedure: RADIOFREQUENCY ABLATION, L2-L3-L4-L5 MEDIAL BRANCH 1 OF 2;  Surgeon: Lina Wagner MD;  Location: [facility] PAIN MGT;  Service: Pain Management;  Laterality: Right;    RADIOFREQUENCY ABLATION Left 09/11/2021    Procedure: RADIOFREQUENCY ABLATION, L2-L3-L4-L5 MEDIAL BR ANCH 2 OF 2;  Surgeon: Lina Wagner MD;  Location: [facility] PAIN MGT;  Service: Pain Management;  Laterality: Left;    RADIOFREQUENCY ABLATION Right 03/07/2022    Procedure: RADIOFREQUENCY ABLATION RIGHT L3,4,5 1 of 2, needs consent;  Surgeon: Israel Hurtado MD;  Location: [facility] PAIN MGT;  Service: Pain Management;  Laterality: Right;    RADIOFREQUENCY ABLATION Left 03/21/2022    Procedure: RADIOFREQUENCY ABLATION RIGHT L3,4,5 2 of 2, needs consent;  Surgeon: Israel Hurtado MD;  Location: [facility] PAIN MGT;  Service: Pain Management;  Laterality: Left;    RADIOFREQUENCY ABLATION Right 05/09/2022    Procedure: RADIOFREQUENCY ABLATION RIGHT GENICULAR COOLED 1 of 2;  Surgeon: Israel Hurtado MD;  Location: [facility] PAIN MGT;  Service: Pain Management;  Laterality: Right;    RADIOFREQUENCY ABLATION Left 05/23/2022    Procedure: RADIOFREQUENCY ABLATION LEFT GENICULAR COOLED  2 of 2;  Surgeon: Israel Hurtado MD;  Location: [facility] PAIN MGT;  Service: Pain Management;  Laterality: Left;    RADIOFREQUENCY ABLATION Right 12/12/2022    Procedure: RADIOFREQUENCY ABLATION RIGHT GENICULAR COOLED  ONE OF TWO  NEEDS CONSENT;  Surgeon: Israel Hurtado MD;  Location: [facility] PAIN MGT;  Service: Pain  Management;  Laterality: Right;    RADIOFREQUENCY ABLATION Left 01/09/2023    Procedure: RADIOFREQUENCY ABLATION LEFT GENICULAR COOLED  TWO OF TWO NEEDS CONSENT;  Surgeon: Israel Hurtado MD;  Location: [facility] PAIN MGT;  Service: Pain Management;  Laterality: Left;    RADIOFREQUENCY ABLATION Right 03/06/2023    Procedure: RADIOFREQUENCY ABLATION RIGHT L3,L4,L5;  Surgeon: Israel Hurtado MD;  Location: [facility] PAIN MGT;  Service: Pain Management;  Laterality: Right;    RADIOFREQUENCY ABLATION Left 05/22/2023    Procedure: RADIOFREQUENCY ABLATION LEFT L3,L4,L5;  Surgeon: Israel Hurtado MD;  Location: [facility] PAIN MGT;  Service: Pain Management;  Laterality: Left;  4/3 LM TO R/S    RADIOFREQUENCY ABLATION Right 11/27/2023    Procedure: RADIOFREQUENCY ABLATION RIGHT L3, 4, 5 1 OF 3;  Surgeon: Israel Hurtado MD;  Location: [facility] PAIN MGT;  Service: Pain Management;  Laterality: Right;    RADIOFREQUENCY ABLATION Right 01/22/2024    Procedure: RADIOFREQUENCY ABLATION RIGHT GENICULAR 3 NERVES 3 OF 3;  Surgeon: Israel Hurtado MD;  Location: [facility] PAIN MGT;  Service: Pain Management;  Laterality: Right;    RADIOFREQUENCY ABLATION Left 02/12/2024    Procedure: RADIOFREQUENCY ABLATION LEFT L3, 4, 5;  Surgeon: Israel Hurtado MD;  Location: [facility] PAIN MGT;  Service: Pain Management;  Laterality: Left;  [phone]    RADIOFREQUENCY ABLATION OF LUMBAR MEDIAL BRANCH NERVE AT SINGLE LEVEL Left 06/26/2018    Procedure: RADIOFREQUENCY ABLATION, NERVE, MEDIAL BRANCH, LUMBAR, 1 LEVEL;  Surgeon: Lina Wagner MD;  Location: [facility] PAIN MGT;  Service: Pain Management;  Laterality: Left;  Left Cooled RFA @ L2,3,4,5  [phone]  with Sedation    1 of 2    RADIOFREQUENCY ABLATION OF LUMBAR MEDIAL BRANCH NERVE AT SINGLE LEVEL Right 07/10/2018    Procedure: RADIOFREQUENCY ABLATION, NERVE, MEDIAL BRANCH, LUMBAR, 1 LEVEL;  Surgeon: Lina Wagner MD;  Location: [facility] PAIN MGT;  Service: Pain Management;  Laterality: Right;  RIght Cooled RFA @  L2,3,4,5  [phone] with Sedation    2 of 2    REVISION OF KNEE ARTHROPLASTY Right 7/18/2024    Procedure: REVISION, ARTHROPLASTY, KNEE: RIGHT;  Surgeon: Theodore Swan MD;  Location: [address];  Service: Orthopedics;  Laterality: Right;    TRIGGER FINGER RELEASE Right 2017    TRIGGER FINGER RELEASE Left 07/29/2019    Procedure: RELEASE, TRIGGER FINGER, Left Thumb;  Surgeon: Velma Garcia MD;  Location: [facility];  Service: Orthopedics;  Laterality: Left;  Local w/ MAC       Review of patient's allergies indicates:   Allergen Reactions    Strawberry Anaphylaxis       Family History       Problem Relation (Age of Onset)    Cancer Sister    Cataracts Mother, Father    Coronary artery disease Brother    Diabetes Mother, Father, Sister, Brother, Brother    Hypertension Sister    Hypotension Brother, Brother    Kidney failure Brother    No Known Problems Sister          Tobacco Use    Smoking status: Never    Smokeless tobacco: Never   Substance and Sexual Activity    Alcohol use: Not Currently     Comment: occasionally     Drug use: No    Sexual activity: Yes     Partners: Female     Birth control/protection: None      Review of Systems   Musculoskeletal:         Knee pain   All other systems reviewed and are negative.    Objective:     Vital Signs (Most Recent):  Temp: 98 °F (36.7 °C) (07/19/24 2315)  Pulse: 64 (07/19/24 2258)  Resp: 16 (07/19/24 2320)  BP: (!) 85/49 (07/20/24 0230)  SpO2: 97 % (07/19/24 2320) Vital Signs (24h Range):  Temp:  [97.4 °F (36.3 °C)-98 °F (36.7 °C)] 98 °F (36.7 °C)  Pulse:  [50-67] 64  Resp:  [14-21] 16  SpO2:  [95 %-100 %] 97 %  BP: (61-96)/(37-70) 85/49   Weight: 116 kg (255 lb 11.7 oz)  Body mass index is 42.56 kg/m².      Intake/Output Summary (Last 24 hours) at 7/20/2024 0303  Last data filed at 7/19/2024 2233  Gross per 24 hour   Intake 4552.69 ml   Output 500 ml   Net 4052.69 ml          Physical Exam  Vitals reviewed.   Constitutional:       General: She is not  in acute distress.     Appearance: She is obese. She is not ill-appearing or toxic-appearing.   HENT:      Head: Normocephalic.      Nose: Nose normal.      Mouth/Throat:      Mouth: Mucous membranes are moist.   Eyes:      Extraocular Movements: Extraocular movements intact.      Pupils: Pupils are equal, round, and reactive to light.   Cardiovascular:      Rate and Rhythm: Normal rate and regular rhythm.      Pulses: Normal pulses.      Heart sounds: Normal heart sounds. No murmur heard.  Pulmonary:      Effort: Pulmonary effort is normal.      Breath sounds: Normal breath sounds.   Abdominal:      General: Abdomen is flat. Bowel sounds are normal.      Palpations: Abdomen is soft.   Musculoskeletal:      Comments: Knee wound dressed   Skin:     General: Skin is warm and dry.      Capillary Refill: Capillary refill takes less than 2 seconds.      Coloration: Skin is not pale.   Neurological:      Mental Status: She is alert.            Vents:     Lines/Drains/Airways       Epidural Line  Duration                  Neuraxial Analgesia/Anesthesia Assessment (using dermatomes) Epidural 07/18/24 1310 1 day         Perineural Analgesia/Anesthesia Assessment (Motor Function-Bromage) 07/18/24 1245 1 day              Peripheral Intravenous Line  Duration                  Peripheral IV - Single Lumen 07/18/24 1230 20 G Anterior;Left;Proximal Forearm 1 day                  Significant Labs:    CBC/Anemia Profile:  Recent Labs   Lab 07/19/24  1343 07/19/24  2305 07/19/24  2309   WBC 11.36  --  7.12   HGB 11.3*  --  10.1*   HCT 35.3* 30* 33.0*     --  165   *  --  102*   RDW 15.0*  --  15.0*        Chemistries:  Recent Labs   Lab 07/19/24  0307 07/19/24  2309    140   K 4.5 4.1   * 114*   CO2 23 19*   BUN 18 26*   CREATININE 0.8 0.9   CALCIUM 8.4* 7.8*   ALBUMIN 3.0*  --    PROT 6.2  --    BILITOT 0.5  --    ALKPHOS 53*  --    ALT 21  --    AST 20  --    MG  --  1.9       All pertinent labs within the  past 24 hours have been reviewed.    Significant Imaging: I have reviewed all pertinent imaging results/findings within the past 24 hours.    ABG  Recent Labs   Lab 07/19/24  2305   PH 7.263*   PO2 48   PCO2 47.7*   HCO3 21.6*   BE -5*     Assessment/Plan:     Pulmonary  Mild intermittent asthma without complication  PRN duonebs, not wheezing at present    Orthopedic  * Failed total right knee replacement  S/p revision on 7/29, developed some post procedural hypercapnia and soft blood pressures without signs of end organ hypoperfusion.   - Given history of PADDY and possibly component of obesity hypoventilation syndrome, recommend careful dosing of opioids and other sedating meds.     Other  Postprocedural hypotension  Patient developed borderline low MAPs that have persisted throughout the day.  There is no evidence of end organ hypoperfusion. Lactic acid is normal, mentation is intact, Cr is stable. Bedside echo without signs of decreased contractility or RV strain. No other clinical signs to indicate infection. S/p IVF resuscitation.   - Contributing factors include: procedural anesthesia, metoprolol XR that was given earlier, opioids.   - Stop Metoprolol  - MAP goal >60.  - For completeness, check 8 am cortisol and TSH levels.  - Ordered midodrine 10 mg tid  - Avoid sedating meds/opioids.   - Can recheck lactic acid with am labs for reassurance.     PADDY (obstructive sleep apnea)  Does not use Home CPAP as she feels claustrophobic.  Mild hypercapnia on VBG.  - Discussed importance of using nightly cpap.  - Avoid opioids/sedating meds if possible       Thank you for your consult.      Oriana Prather MD  Critical Care Medicine  [facility]

## 2024-07-20 NOTE — PT/OT/SLP PROGRESS
Physical Therapy Treatment    Patient Name:  Alivia Thomas   MRN:  [id number]    Recommendations:     Discharge Recommendations: Low Intensity Therapy  Discharge Equipment Recommendations: bedside commode  Barriers to discharge: None    Assessment:     Alivia Thomas is a 58 y.o. female admitted with a medical diagnosis of Failed total right knee replacement.  She presents with the following impairments/functional limitations: weakness, impaired endurance, impaired functional mobility, gait instability, decreased lower extremity function, pain, decreased ROM, orthopedic precautions . Pt Progressing with PT Intervention. Pt Progressing with improving gait distance. Pt would continue to benefit from skilled PT to address overall functional mobility, goals , and to return to functional baseline.  Goals remain appropriate.     Rehab Prognosis: Good; patient would benefit from acute skilled PT services to address these deficits and reach maximum level of function.    Recent Surgery: Procedure(s) (LRB):  REVISION, ARTHROPLASTY, KNEE: RIGHT (Right) 2 Days Post-Op    Plan:     During this hospitalization, patient to be seen 4 x/week to address the identified rehab impairments via gait training, therapeutic activities, therapeutic exercises, neuromuscular re-education and progress toward the following goals:    Plan of Care Expires:  08/19/24    Subjective       Patient/Family Comments/goals: I would like to get up and walk  Pain/Comfort:  Pain Rating 1: 9/10  Location - Side 1: Right  Location - Orientation 1: generalized  Location 1: knee  Pain Addressed 1: Reposition, Distraction, Cessation of Activity      Objective:     Communicated with RN prior to session.  Patient found HOB elevated with  (perineural catheter; oxygen) upon PT entry to room.     General Precautions: Standard, fall  Orthopedic Precautions: RLE weight bearing as tolerated  Braces: Hinged knee brace  Respiratory Status: Nasal cannula, flow 2 L/min      Functional Mobility:  Bed Mobility:     Supine to Sit: stand by assistance with increased time  BP taken seated on EOB at 111/68 MAP85  Transfers:     Sit to Stand with Contact guard assistance from EOB with RW  Gait: pt ambulated 240 feet with SBA with RW with RLE WBAT, pt rested in bedside chair following for safety, Pt demonstrated decreased step length, decreased toe clearance, flexed trunk, and increased time to initiate LLE swing phase due to pain/apprehension with RLE single limb stance.   BP after gait at 118/55 MAP 79    AM-PAC 6 CLICK MOBILITY  Turning over in bed (including adjusting bedclothes, sheets and blankets)?: 4  Sitting down on and standing up from a chair with arms (e.g., wheelchair, bedside commode, etc.): 3  Moving from lying on back to sitting on the side of the bed?: 4  Moving to and from a bed to a chair (including a wheelchair)?: 4  Need to walk in hospital room?: 4  Climbing 3-5 steps with a railing?: 3  Basic Mobility Total Score: 22       Treatment & Education:  Therapist provided instruction and educated of  patient on progress, safety,d/c,PT POC,   proper body mechanics, energy conservation, and fall prevention strategies during tasks listed above, on the effects of prolonged immobility and the importance of performing OOB activity and exercises to promote healing and reduce recovery time     Patient  facilitated therex  seated in bedside chair B LE AROM AP,  Hip Flexion, Hip Abd/Add. Patient required skilled PTA for instruction of exercises and appropriate cues to perform exercises safely, sequencing and appropriately.   Exercises performed to develop and maintain pt's strength, endurance and flexibility.  Updated white board with appropriate PT mobility information for medical team notification   Donned an extra gown  Pt safe to ambulate in hallway with RN or PCT assistance  Call nursing/pct to transfer to chair/use bathroom. Pt stated understanding  Bedside table in front of  patient and area set up for function, convenience, and safety. RN aware of patient's mobility needs and status. Questions/concerns addressed within PTA scope of practice, patient with no further questions. Time was provided for active listening, discussion of health disposition, and discussion of safe discharge. Pt?verbalized?agreement .       Patient left up in chair with all lines intact, call button in reach, and nsg present..    GOALS:   Multidisciplinary Problems       Physical Therapy Goals          Problem: Physical Therapy    Goal Priority Disciplines Outcome Goal Variances Interventions   Physical Therapy Goal     PT, PT/OT Progressing     Description: Pt goals till 8/2/24  1. Pt supine to sit with Supervision-not met  2. Pt sit to supine with Supervision-not met  3. Pt sit to stand with Supervision with RW-not met  4. Pt to perform gait 100ft with Supervision with RW with RLE WBAT.-not met  5. Pt to perform B LE exs in sitting or supine x 15 reps to strengthen B LE to improve functional mobility.-not met                           Time Tracking:     PT Received On: 07/20/24  PT Start Time: 1033     PT Stop Time: 1127  PT Total Time (min): 54 min     Billable Minutes: Gait Training 30 and Therapeutic Activity 24    Treatment Type: Treatment  PT/PTA: PTA     Number of PTA visits since last PT visit: 1 07/20/2024

## 2024-07-20 NOTE — ASSESSMENT & PLAN NOTE
Does not use Home CPAP as she feels claustrophobic.  - Discussed importance of using nightly cpap.

## 2024-07-21 VITALS
BODY MASS INDEX: 42.61 KG/M2 | WEIGHT: 255.75 LBS | HEART RATE: 94 BPM | HEIGHT: 65 IN | DIASTOLIC BLOOD PRESSURE: 81 MMHG | OXYGEN SATURATION: 94 % | RESPIRATION RATE: 18 BRPM | TEMPERATURE: 97 F | SYSTOLIC BLOOD PRESSURE: 130 MMHG

## 2024-07-21 LAB
POCT GLUCOSE: 129 MG/DL (ref 70–110)
POCT GLUCOSE: 91 MG/DL (ref 70–110)

## 2024-07-21 PROCEDURE — 25000003 PHARM REV CODE 250

## 2024-07-21 PROCEDURE — 97116 GAIT TRAINING THERAPY: CPT | Mod: CQ

## 2024-07-21 PROCEDURE — 94761 N-INVAS EAR/PLS OXIMETRY MLT: CPT

## 2024-07-21 PROCEDURE — 97535 SELF CARE MNGMENT TRAINING: CPT

## 2024-07-21 PROCEDURE — 25000003 PHARM REV CODE 250: Performed by: NURSE PRACTITIONER

## 2024-07-21 PROCEDURE — 97530 THERAPEUTIC ACTIVITIES: CPT | Mod: CQ

## 2024-07-21 PROCEDURE — 99900035 HC TECH TIME PER 15 MIN (STAT)

## 2024-07-21 RX ORDER — METOPROLOL SUCCINATE 25 MG/1
25 TABLET, EXTENDED RELEASE ORAL DAILY
Start: 2024-07-21 | End: 2025-07-21

## 2024-07-21 RX ORDER — HYDROMORPHONE HYDROCHLORIDE 1 MG/ML
0.5 INJECTION, SOLUTION INTRAMUSCULAR; INTRAVENOUS; SUBCUTANEOUS
Status: DISCONTINUED | OUTPATIENT
Start: 2024-07-21 | End: 2024-07-21 | Stop reason: HOSPADM

## 2024-07-21 RX ORDER — ACETAMINOPHEN 500 MG
1 TABLET ORAL DAILY
Qty: 1 EACH | Refills: 0 | Status: SHIPPED | OUTPATIENT
Start: 2024-07-21

## 2024-07-21 RX ORDER — OXYCODONE HYDROCHLORIDE 10 MG/1
10 TABLET ORAL EVERY 4 HOURS PRN
Qty: 28 TABLET | Refills: 0 | Status: SHIPPED | OUTPATIENT
Start: 2024-07-21

## 2024-07-21 RX ADMIN — APIXABAN 5 MG: 5 TABLET, FILM COATED ORAL at 09:07

## 2024-07-21 RX ADMIN — FLUOXETINE HYDROCHLORIDE 40 MG: 20 CAPSULE ORAL at 09:07

## 2024-07-21 RX ADMIN — METHOCARBAMOL 750 MG: 750 TABLET ORAL at 09:07

## 2024-07-21 RX ADMIN — ACETAMINOPHEN 1000 MG: 325 TABLET ORAL at 12:07

## 2024-07-21 RX ADMIN — FAMOTIDINE 20 MG: 20 TABLET ORAL at 09:07

## 2024-07-21 RX ADMIN — ACETAMINOPHEN 1000 MG: 325 TABLET ORAL at 11:07

## 2024-07-21 RX ADMIN — SENNOSIDES AND DOCUSATE SODIUM 1 TABLET: 50; 8.6 TABLET ORAL at 09:07

## 2024-07-21 RX ADMIN — MUPIROCIN 1 G: 20 OINTMENT TOPICAL at 09:07

## 2024-07-21 RX ADMIN — OXYCODONE HYDROCHLORIDE 10 MG: 10 TABLET ORAL at 09:07

## 2024-07-21 RX ADMIN — CELECOXIB 200 MG: 200 CAPSULE ORAL at 09:07

## 2024-07-21 RX ADMIN — CEFADROXIL 500 MG: 500 CAPSULE ORAL at 09:07

## 2024-07-21 NOTE — ASSESSMENT & PLAN NOTE
Dx with pAF after stroke in 2024.    - Continue Eliquis 5 BID  -in NSR now on EKG. Hold metoprolol on dc unless BP systolic >120 consistently and placed on order

## 2024-07-21 NOTE — PT/OT/SLP PROGRESS
Physical Therapy Treatment    Patient Name:  Alivia Thomas   MRN:  [id number]    Recommendations:     Discharge Recommendations: Low Intensity Therapy  Discharge Equipment Recommendations: bedside commode  Barriers to discharge: None    Assessment:     Alivia Thomas is a 58 y.o. female admitted with a medical diagnosis of Failed total right knee replacement.  She presents with the following impairments/functional limitations: weakness, impaired endurance, impaired functional mobility, gait instability, decreased lower extremity function, pain, decreased ROM, orthopedic precautions . presents with  improved overall functional mobility requiring decreased assistance and increased activity tolerance to perform functional mobility.Pt would continue to benefit from skilled PT to address overall functional mobility, to return to functional baseline and goals. Goals remain appropriate.        Rehab Prognosis: Good; patient would benefit from acute skilled PT services to address these deficits and reach maximum level of function.    Recent Surgery: Procedure(s) (LRB):  REVISION, ARTHROPLASTY, KNEE: RIGHT (Right) 3 Days Post-Op    Plan:     During this hospitalization, patient to be seen 4 x/week to address the identified rehab impairments via gait training, therapeutic activities, therapeutic exercises, neuromuscular re-education and progress toward the following goals:    Plan of Care Expires:  08/19/24    Subjective       Patient/Family Comments/goals: I ant to go wash off in the bathroom  Pain/Comfort:  Pain Rating 1: 8/10  Location - Side 1: Right  Location - Orientation 1: generalized  Location 1: knee  Pain Addressed 1: Reposition, Distraction      Objective:     Communicated with RN prior to session.  Patient found ambulatory in room/espinoza with telemetry upon PT entry to room.     General Precautions: Standard, fall  Orthopedic Precautions: RLE weight bearing as tolerated  Braces: Hinged knee brace  Respiratory  Status: Room air     Functional Mobility:  ransfers:     Sit to Stand with SBA with RW  Gait: pt ambulated 200 feet with SBA with RW with RLE WBAT, pt rested in bedside chair following for safety, Pt demonstrated decreased step length, decreased toe clearance, flexed trunk      AM-PAC 6 CLICK MOBILITY  Turning over in bed (including adjusting bedclothes, sheets and blankets)?: 4  Sitting down on and standing up from a chair with arms (e.g., wheelchair, bedside commode, etc.): 3  Moving from lying on back to sitting on the side of the bed?: 4  Moving to and from a bed to a chair (including a wheelchair)?: 4  Need to walk in hospital room?: 4  Climbing 3-5 steps with a railing?: 3  Basic Mobility Total Score: 22       Treatment & Education:  Therapist provided instruction and educated of  patient on progress, safety,d/c,PT POC,   proper body mechanics, energy conservation, and fall prevention strategies during tasks listed above, on the effects of prolonged immobility and the importance of performing OOB activity and exercises to promote healing and reduce recovery time     Updated white board with appropriate PT mobility information for medical team notification   Donned an extra gown  Pt safe to ambulate in hallway with RN or PCT assistance  Call nursing/pct to transfer to chair/use bathroom. Pt stated understanding  Bedside table in front of patient and area set up for function, convenience, and safety. RN aware of patient's mobility needs and status. Questions/concerns addressed within PTA scope of practice, patient with no further questions. Time was provided for active listening, discussion of health disposition, and discussion of safe discharge. Pt?verbalized?agreement .        Patient left seated with all lines intact, call button in reach  GOALS:   Multidisciplinary Problems       Physical Therapy Goals          Problem: Physical Therapy    Goal Priority Disciplines Outcome Goal Variances Interventions    Physical Therapy Goal     PT, PT/OT Progressing     Description: Pt goals till 8/2/24  1. Pt supine to sit with Supervision-not met  2. Pt sit to supine with Supervision-not met  3. Pt sit to stand with Supervision with RW-not met  4. Pt to perform gait 100ft with Supervision with RW with RLE WBAT.-not met  5. Pt to perform B LE exs in sitting or supine x 15 reps to strengthen B LE to improve functional mobility.-not met                           Time Tracking:     PT Received On: 07/21/24  PT Start Time: 0752     PT Stop Time: 0816  PT Total Time (min): 24 min     Billable Minutes: Gait Training 15 and Therapeutic Activity 9    Treatment Type: Treatment  PT/PTA: PTA     Number of PTA visits since last PT visit: 2 07/21/2024

## 2024-07-21 NOTE — SUBJECTIVE & OBJECTIVE
Principal Problem:Failed total right knee replacement    Principal Orthopedic Problem: same    Interval History: Patient seen and examined at bedside. S/p right revsion TKA on 7/18/24. NAEON, patient was states that her pain is controlled today. She walked 240' with PT today.     Will discuss potential discharge today with medicine co-management team to ensure that she is medically stable to go.        Review of patient's allergies indicates:   Allergen Reactions    Strawberry Anaphylaxis       Current Facility-Administered Medications   Medication    acetaminophen tablet 1,000 mg    apixaban tablet 5 mg    bisacodyL suppository 10 mg    cefadroxil capsule 500 mg    celecoxib capsule 200 mg    dextrose 10% bolus 125 mL 125 mL    dextrose 10% bolus 250 mL 250 mL    famotidine tablet 20 mg    FLUoxetine capsule 40 mg    glucagon (human recombinant) injection 1 mg    glucose chewable tablet 16 g    glucose chewable tablet 24 g    HYDROmorphone injection 0.5 mg    insulin aspart U-100 pen 0-10 Units    methocarbamoL tablet 750 mg    mupirocin 2 % ointment 1 g    naloxone 0.4 mg/mL injection 0.02 mg    ondansetron injection 4 mg    oxyCODONE immediate release tablet 5 mg    oxyCODONE immediate release tablet Tab 10 mg    polyethylene glycol packet 17 g    pregabalin capsule 75 mg    prochlorperazine injection Soln 5 mg    senna-docusate 8.6-50 mg per tablet 1 tablet     Facility-Administered Medications Ordered in Other Encounters   Medication    0.9%  NaCl infusion    0.9%  NaCl infusion    0.9%  NaCl infusion     Objective:     Vital Signs (Most Recent):  Temp: 98.3 °F (36.8 °C) (07/21/24 0406)  Pulse: 103 (07/21/24 0406)  Resp: 18 (07/21/24 0406)  BP: 120/62 (07/21/24 0406)  SpO2: (!) 94 % (07/21/24 0406) Vital Signs (24h Range):  Temp:  [97.4 °F (36.3 °C)-99.5 °F (37.5 °C)] 98.3 °F (36.8 °C)  Pulse:  [] 103  Resp:  [16-20] 18  SpO2:  [93 %-97 %] 94 %  BP: ()/(52-68) 120/62     Weight: 116 kg (255 lb 11.7  "oz)  Height: 5' 5" (165.1 cm)  Body mass index is 42.56 kg/m².      Intake/Output Summary (Last 24 hours) at 7/21/2024 0637  Last data filed at 7/20/2024 1855  Gross per 24 hour   Intake 720 ml   Output 750 ml   Net -30 ml        Ortho/SPM Exam  AAOx4  NAD  Reg rate  No increased WOB    RLE:  Dressing c/d/i  Polar ice in place  SILT T/SP/DP/Mclean/Sa  Motor intact T/SP/DP  WWP extremities  FCDs in place and functioning  T scope brace in place       Significant Labs: All pertinent labs within the past 24 hours have been reviewed.    Significant Imaging: I have reviewed all pertinent imaging results/findings.  "

## 2024-07-21 NOTE — PT/OT/SLP PROGRESS
Occupational Therapy   Treatment    Name: Alivia Thomas  MRN: [id number]  Admitting Diagnosis:  Failed total right knee replacement  3 Days Post-Op    Recommendations:     Discharge Recommendations: Low Intensity Therapy  Discharge Equipment Recommendations:  bedside commode (already deliverd in hospital room)  Barriers to discharge:  None    Assessment:     Alivia Thomas is a 58 y.o. female with a medical diagnosis of Failed total right knee replacement.  She presents with performance deficits affecting function are weakness, gait instability, impaired functional mobility, orthopedic precautions, pain, decreased safety awareness, decreased lower extremity function. Patient demonstrated good awareness of safety with use of rolling walker in functional mobility in session. Patient was highly motivated to progress.     Rehab Prognosis:  Good; patient would benefit from acute skilled OT services to address these deficits and reach maximum level of function.       Plan:     Patient to be seen 3 x/week to address the above listed problems via self-care/home management, therapeutic activities, therapeutic exercises  Plan of Care Expires: 08/18/24  Plan of Care Reviewed with: patient    Subjective     Chief Complaint: Pain in R knee   Patient/Family Comments/goals: Return to prior level of function and recover well   Pain/Comfort:  Pain Rating 1: 8/10  Location - Side 1: Right  Location - Orientation 1: generalized  Location 1: knee  Pain Addressed 1: Reposition, Distraction    Objective:     Communicated with: RN prior to session.  Patient found supine with telemetry upon OT entry to room.    General Precautions: Standard, fall    Orthopedic Precautions:RLE weight bearing as tolerated  Braces: Hinged knee brace  Respiratory Status: Room air     Occupational Performance:     Bed Mobility:    Patient completed Rolling/Turning to Right with stand by assistance  Patient completed Scooting/Bridging with stand by  assistance  Patient completed Supine to Sit with stand by assistance  Patient completed Sit to Supine with stand by assistance     Functional Mobility/Transfers:  Patient completed Sit <> Stand Transfer with contact guard assistance  with  rolling walker   Patient completed Toilet Transfer Stand Pivot technique with stand by assistance with  rolling walker and grab bars  Functional Mobility: Patient paricipated in functional mobility in room from bed to bathroom and back to simulate short household distances with CGA to close SBA with RW. Patient demonstrated appropriate RW management with minimal need for verbal cues.     Activities of Daily Living:  Toileting: supervision patient demonstrated good seated balance, toilet transfer simulation       [facility] 6 Click ADL: 20    Treatment & Education:  -Patient educated on RLE WB as tolerated precautions.  -Patient educated on RW use for increased safety upon return home.   -Patient educated on the importance of communicating any physiological changes while standing, transfers, or participating in functional mobility.   -Education on the importance of position changing and functional mobility activity to promote recovery and endurance.  -Education to transfer and participate in mobility with hospital staff present    -Education on energy conservation and task modification to increase participation in functional activities   -Provided education on the role of OT.       Patient left supine with call button in reach and RN notified    GOALS:   Multidisciplinary Problems       Occupational Therapy Goals          Problem: Occupational Therapy    Goal Priority Disciplines Outcome Interventions   Occupational Therapy Goal     OT, PT/OT Progressing    Description: Goals to be met by: 8/18/24     Patient will increase functional independence with ADLs by performing:    UE Dressing with Supervision.  LE Dressing with Stand-by Assistance.  Grooming while standing at sink with  Supervision.  Toileting from toilet with Supervision for hygiene and clothing management.   All functional transfers performed with SBA                         Time Tracking:     OT Date of Treatment: 07/21/24  OT Start Time: 0855  OT Stop Time: 0911  OT Total Time (min): 16 min    Billable Minutes:Self Care/Home Management 16 min    OT/AKIN: OT     Number of AKIN visits since last OT visit: 0    7/21/2024

## 2024-07-21 NOTE — ASSESSMENT & PLAN NOTE
Alivia Thomas is a 58 y.o. female is s/p R revision TKA on 7/18/24    Surgical dressing C/D/I, bulky dressing taken down, ice in place  Pain control: multimodal, Anesthesia Surgical Home following  PT/OT: WBAT RLE   T scope 0-90 degrees   DVT PPx: home eliquis, FCDs at all times when not ambulating  Abx: postop Ancef, then cefadroxil PO x7 days  Drain: none  Kaufman: none    Dispo: Plan for likely discharge today, pending discussion with medicine co-management team.

## 2024-07-21 NOTE — NURSING
Nurses Note -- 4 Eyes      7/20/2024   11:06 PM      Skin assessed during: Daily Assessment      [] No Altered Skin Integrity Present    []Prevention Measures Documented      [] Yes- Altered Skin Integrity Present or Discovered   [] LDA Added if Not in Epic (Describe Wound)   [] New Altered Skin Integrity was Present on Admit and Documented in LDA   [] Wound Image Taken    Wound Care Consulted? No    Attending Nurse:  Duc ARZOLA    Second RN/Staff Member:   Ibeth ALEXANDER

## 2024-07-21 NOTE — PROGRESS NOTES
[facility] - [facility]  Orthopedics  Progress Note    Patient Name: Alivia Marie Cyr  MRN: [id number]  Admission Date: 7/18/2024  Hospital Length of Stay: 3 days  Attending Provider: Theodore Swan MD  Primary Care Provider: Clarisse Richardson MD  Follow-up For: Procedure(s) (LRB):  REVISION, ARTHROPLASTY, KNEE: RIGHT (Right)    Post-Operative Day: 3 Days Post-Op  Subjective:     Principal Problem:Failed total right knee replacement    Principal Orthopedic Problem: same    Interval History: Patient seen and examined at bedside. S/p right revsion TKA on 7/18/24. NAEON, patient was states that her pain is controlled today. She walked 240' with PT today.     Will discuss potential discharge today with medicine co-management team to ensure that she is medically stable to go.        Review of patient's allergies indicates:   Allergen Reactions    Strawberry Anaphylaxis       Current Facility-Administered Medications   Medication    acetaminophen tablet 1,000 mg    apixaban tablet 5 mg    bisacodyL suppository 10 mg    cefadroxil capsule 500 mg    celecoxib capsule 200 mg    dextrose 10% bolus 125 mL 125 mL    dextrose 10% bolus 250 mL 250 mL    famotidine tablet 20 mg    FLUoxetine capsule 40 mg    glucagon (human recombinant) injection 1 mg    glucose chewable tablet 16 g    glucose chewable tablet 24 g    HYDROmorphone injection 0.5 mg    insulin aspart U-100 pen 0-10 Units    methocarbamoL tablet 750 mg    mupirocin 2 % ointment 1 g    naloxone 0.4 mg/mL injection 0.02 mg    ondansetron injection 4 mg    oxyCODONE immediate release tablet 5 mg    oxyCODONE immediate release tablet Tab 10 mg    polyethylene glycol packet 17 g    pregabalin capsule 75 mg    prochlorperazine injection Soln 5 mg    senna-docusate 8.6-50 mg per tablet 1 tablet     Facility-Administered Medications Ordered in Other Encounters   Medication    0.9%  NaCl infusion    0.9%  NaCl infusion    0.9%  NaCl infusion     Objective:     Vital Signs  "(Most Recent):  Temp: 98.3 °F (36.8 °C) (07/21/24 0406)  Pulse: 103 (07/21/24 0406)  Resp: 18 (07/21/24 0406)  BP: 120/62 (07/21/24 0406)  SpO2: (!) 94 % (07/21/24 0406) Vital Signs (24h Range):  Temp:  [97.4 °F (36.3 °C)-99.5 °F (37.5 °C)] 98.3 °F (36.8 °C)  Pulse:  [] 103  Resp:  [16-20] 18  SpO2:  [93 %-97 %] 94 %  BP: ()/(52-68) 120/62     Weight: 116 kg (255 lb 11.7 oz)  Height: 5' 5" (165.1 cm)  Body mass index is 42.56 kg/m².      Intake/Output Summary (Last 24 hours) at 7/21/2024 0637  Last data filed at 7/20/2024 1855  Gross per 24 hour   Intake 720 ml   Output 750 ml   Net -30 ml        Ortho/SPM Exam  AAOx4  NAD  Reg rate  No increased WOB    RLE:  Dressing c/d/i  Polar ice in place  SILT T/SP/DP/Mclean/Sa  Motor intact T/SP/DP  WWP extremities  FCDs in place and functioning  T scope brace in place       Significant Labs: All pertinent labs within the past 24 hours have been reviewed.    Significant Imaging: I have reviewed all pertinent imaging results/findings.  Assessment/Plan:     * Failed total right knee replacement  Alivia Thomas is a 58 y.o. female is s/p R revision TKA on 7/18/24    Surgical dressing C/D/I, bulky dressing taken down, ice in place  Pain control: multimodal, Anesthesia Surgical Home following  PT/OT: WBAT RLE   T scope 0-90 degrees   DVT PPx: home eliquis, FCDs at all times when not ambulating  Abx: postop Ancef, then cefadroxil PO x7 days  Drain: none  Kaufman: none    Dispo: Plan for likely discharge today, pending discussion with medicine co-management team.             YOLANDA FERNANDEZ MD  Orthopedics  [facility]    "

## 2024-07-21 NOTE — ASSESSMENT & PLAN NOTE
Chronic, controlled. Latest blood pressure and vitals reviewed-     Temp:  [97.4 °F (36.3 °C)-99.5 °F (37.5 °C)]   Pulse:  []   Resp:  [16-20]   BP: (108-120)/(55-68)   SpO2:  [91 %-97 %] .   Home meds for hypertension were reviewed and noted below.   Hypertension Medications               metoprolol succinate (TOPROL-XL) 25 MG 24 hr tablet Take 1 tablet (25 mg total) by mouth once daily.          - Holding all antihypertensives in setting of hypotension and BP much better in last 24 hours now after hypotension episode with rapid response on 7/19 overnight. Midodrine stopped mdi morning on 7/20 and Bp 110s-120s systolic. Hold metoprolol ond c until BP consistently over 120. BP cuff sent to pharm to try to get for discharge, if unable then I told her to hold until clinic f/u and if bp over 120 then can resume metoprolol as was on for afib and is NSR now

## 2024-07-21 NOTE — ASSESSMENT & PLAN NOTE
S/p R TKA Revision on 7/18/24.    - Management per primary Orthopedics team  - home with HH today

## 2024-07-21 NOTE — ASSESSMENT & PLAN NOTE
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 Developed persistent hypotension on 7/19 that only modestly improved BP with borderline MAPs that persisted throughout the day. Initial labs without evidence of end organ hypoperfusion---serial lactates negative, Cr baseline, trop negative, and mentation is intact. MICU was consulted overnight and performed bedside echo that was without signs of decreased contractility or RV strain. On exam, pt perfusing extremities appropriately and no evidence of active bleeding. No other clinical signs to indicate infection with normal WBC and no fevers. Given the above, less concern for shock. Suspect hypotension is multifactorial with the following: procedural anesthesia, metoprolol XR given earlier on 7/18 while patient was already hypotensive, post-operative analgesia with opioid medications.    - see hypotension above  -hold metoprolol on dc until bp >120 consistently. BP cuff on dc ordered if able to get from pharmacy  -resolved, off midodrine >24 hours. Only on briefly on 7/19 overnight to 7/20 AM

## 2024-07-21 NOTE — PLAN OF CARE
BP trends stable and in 110s-120s systolic off midodrine since mid morning yesterday. Stable for discharge with HH today given this. Placed on dc orders under metoprolol to HOLD on discharge until BP is consistently over 120/80 to avoid hypotension while still equilibrating post op on discharge. On for hx of afib, has been in NSR this admit on EKG during rapid on Friday pm

## 2024-07-21 NOTE — PLAN OF CARE
Pt x4, with her BP still running soft, though she shows no signs or symptoms of hypotension. I asked the MD if it was okay for her to have an oxycodone since she was reporting pain and they agreed to give her an oxycodone 5 mg. Pt tolerated it well and is currently resting. Pt care ongoing.  Problem: Adult Inpatient Plan of Care  Goal: Plan of Care Review  Outcome: Progressing  Goal: Patient-Specific Goal (Individualized)  Outcome: Progressing  Goal: Absence of Hospital-Acquired Illness or Injury  Outcome: Progressing  Goal: Optimal Comfort and Wellbeing  Outcome: Progressing

## 2024-07-21 NOTE — PLAN OF CARE
[facility] - [facility]  Discharge Final Note    Primary Care Provider: Clarisse Richardson MD    Expected Discharge Date: 7/21/2024    Final Discharge Note (most recent)       Final Note - 07/21/24 1605          Final Note    Assessment Type Final Discharge Note     Anticipated Discharge Disposition [place] [facility]     What phone number can be called within the next 1-3 days to see how you are doing after discharge? [phone]        Post-Acute Status    Post-Acute Authorization Home Health     Home Health Status Set-up Complete/Auth obtained     Discharge Delays None known at this time                     Important Message from Medicare  Important Message from Medicare regarding Discharge Appeal Rights: Given to patient/caregiver, Explained to patient/caregiver, Signed/date by patient/caregiver     Date IMM was signed: 07/21/24  Time IMM was signed: 1206      Pt discharged home with RW and BSC. Referrals sent to [facility].    Yazmin Cooper LMSW  Case Management [facility] [place]  [phone]

## 2024-07-21 NOTE — SUBJECTIVE & OBJECTIVE
Interval history- seen this morning and she had just cleaned up in bathroom and done hygiene and washed her face, brushed her hair, etc and feels better after this and ready for discharge today. Bp has been stable since midodrine stopped after dose mid morning (did not get 2 pm dose) and in 110s-120s now. Discussed holding metoprolol at home unless consistently over 120 systolic. She does not have home bp cuff and sent one to pharmacy to see if can get delivered to bedside for dsicharge, if unabel to do so I told her can hold until her cilnic follow ups and then can resume if Bp is over 120 systolic then, as she was on it for paroxysml afib but is in NSR during this stay with HR controlled. Updated ortho on above and they plan for discharge today with ortho follow up.    Review of patient's allergies indicates:   Allergen Reactions    Strawberry Anaphylaxis       Current Facility-Administered Medications on File Prior to Encounter   Medication    0.9%  NaCl infusion    0.9%  NaCl infusion    0.9%  NaCl infusion     Current Outpatient Medications on File Prior to Encounter   Medication Sig    albuterol (VENTOLIN HFA) 90 mcg/actuation inhaler INHALE TWO PUFFS INTO LUNGS EVERY 4 TO 6 HOURS AS NEEDED FOR SHORTNESS OF BREATH AND FOR WHEEZING    allopurinoL (ZYLOPRIM) 300 MG tablet Take 1 tablet (300 mg total) by mouth 2 (two) times daily.    atorvastatin (LIPITOR) 80 MG tablet Take 1 tablet (80 mg total) by mouth once daily.    b complex vitamins tablet Take 1 tablet by mouth every other day.     calcium citrate/vitamin D2 (ISIAH-CITRATE ORAL) Take 500 mg by mouth once daily.    colchicine (MITIGARE) 0.6 mg Cap One daily as needed for gout flare (Patient taking differently: One daily)    cyanocobalamin (VITAMIN B-12) 1000 MCG tablet Take 100 mcg by mouth every morning.    diclofenac sodium (VOLTAREN) 1 % Gel Apply 2 g topically 4 (four) times daily as needed. To painful area on the feet.    fluticasone propionate (FLONASE)  50 mcg/actuation nasal spray 1 SPRAY BY EACH NOSTRIL ROUTE 2 TIMES DAILY AS NEEDED FOR RHINITIS.    LIDOcaine (XYLOCAINE) 5 % Oint ointment APPLY TOPICALLY AS NEEDED.    MULTIVIT-IRON-MIN-FOLIC ACID 3,500-18-0.4 UNIT-MG-MG ORAL CHEW Take 1 tablet by mouth 2 (two) times daily.     nystatin (MYCOSTATIN) powder Apply topically 2 (two) times daily.    oxybutynin (DITROPAN-XL) 5 MG TR24 Take 1 tablet (5 mg total) by mouth once daily.    prednisoLONE acetate (PRED FORTE) 1 % DrpS Place 1 drop into the right eye 3 (three) times daily.    urea (CARMOL) 40 % Crea Apply topically once daily. To dry skin on the feet.    vitamin D 185 MG Tab Take 5,000 mg by mouth every morning.     [DISCONTINUED] metoprolol succinate (TOPROL-XL) 25 MG 24 hr tablet Take 1 tablet (25 mg total) by mouth once daily.    apixaban (ELIQUIS) 5 mg Tab Take 1 tablet (5 mg total) by mouth 2 (two) times daily.    FLUoxetine 40 MG capsule Take 1 capsule (40 mg total) by mouth once daily.    semaglutide (OZEMPIC) 1 mg/dose (4 mg/3 mL) Inject 1 mg into the skin every 7 days.     Family History       Problem Relation (Age of Onset)    Cancer Sister    Cataracts Mother, Father    Coronary artery disease Brother    Diabetes Mother, Father, Sister, Brother, Brother    Hypertension Sister    Hypotension Brother, Brother    Kidney failure Brother    No Known Problems Sister          Tobacco Use    Smoking status: Never    Smokeless tobacco: Never   Substance and Sexual Activity    Alcohol use: Not Currently     Comment: occasionally     Drug use: No    Sexual activity: Yes     Partners: Female     Birth control/protection: None     Review of Systems   Constitutional:  Negative for chills and fever.   HENT:  Negative for congestion and sore throat.    Eyes:  Negative for photophobia and visual disturbance.   Respiratory:  Negative for cough and shortness of breath.    Cardiovascular:  Negative for chest pain and palpitations.   Gastrointestinal:  Negative for  abdominal pain and diarrhea.   Genitourinary:  Negative for dysuria and hematuria.   Musculoskeletal:  Positive for arthralgias and back pain. Negative for gait problem.   Skin:  Negative for rash and wound.   Neurological:  Negative for syncope and headaches.   Psychiatric/Behavioral:  Negative for agitation and behavioral problems.      Objective:     Vital Signs (Most Recent):  Temp: 98.8 °F (37.1 °C) (07/21/24 0702)  Pulse: 102 (07/21/24 0702)  Resp: 17 (07/21/24 0934)  BP: 120/62 (07/21/24 0406)  SpO2: 95 % (07/21/24 0915) Vital Signs (24h Range):  Temp:  [97.4 °F (36.3 °C)-99.5 °F (37.5 °C)] 98.8 °F (37.1 °C)  Pulse:  [] 102  Resp:  [16-20] 17  SpO2:  [91 %-97 %] 95 %  BP: (108-120)/(55-68) 120/62     Weight: 116 kg (255 lb 11.7 oz)  Body mass index is 42.56 kg/m².     Physical Exam  Vitals reviewed.   Constitutional:       General: She is not in acute distress.     Appearance: Normal appearance. She is obese.      Comments: Ambulating back to bed during exam   HENT:      Head: Normocephalic and atraumatic.      Nose: No congestion or rhinorrhea.      Mouth/Throat:      Mouth: Mucous membranes are moist.      Pharynx: Oropharynx is clear.   Eyes:      Extraocular Movements: Extraocular movements intact.      Pupils: Pupils are equal, round, and reactive to light.   Cardiovascular:      Rate and Rhythm: Normal rate and regular rhythm.      Pulses: Normal pulses.      Heart sounds: Normal heart sounds.   Pulmonary:      Effort: Pulmonary effort is normal. No respiratory distress.      Breath sounds: Normal breath sounds.   Abdominal:      General: Bowel sounds are normal.      Palpations: Abdomen is soft.      Tenderness: There is no abdominal tenderness.   Musculoskeletal:         General: No swelling or tenderness.      Comments: R knee in brace  Knee pain   Skin:     Findings: No bruising or rash.   Neurological:      General: No focal deficit present.      Mental Status: She is alert and oriented to  person, place, and time.   Psychiatric:         Mood and Affect: Mood normal.         Behavior: Behavior normal.          Significant Labs: All pertinent labs within the past 24 hours have been reviewed.    Significant Imaging: I have reviewed all pertinent imaging results/findings within the past 24 hours.

## 2024-07-21 NOTE — NURSING
" Pt discharged to home via wheelchair. Pt received all d/c medication from bedside pharmacy  and copy of discharged papers instructions. Pt denies pain at this time. Pt educated on signs and symptoms of when to call the doctor. All belongings with the patient . Pt verbalized understanding. Patient left with bedside commode and walker as ordered . Patient did not received b/p machine form bedside pharmacy . Instructed to  b/p machine and monitor b/p and document . Instructed  on metoprolol has specific orders to check B/p prior to taking medication . Hold on discharge until b/p is consistently 120/80. Patient stated, "  I will  b/p machine ."    "

## 2024-07-21 NOTE — DISCHARGE SUMMARY
[facility]  Orthopedics  Discharge Summary      Patient Name: Alivia Thomas  MRN: [id number]  Admission Date: 7/18/2024  Hospital Length of Stay: 3 days  Discharge Date and Time:  07/21/2024 10:22 AM  Attending Physician: Theodore Swan MD   Discharging Provider: YOLANDA FERNANDEZ MD  Primary Care Provider: Clarisse Richardson MD    HPI: Alivia Thomas is a 58 y.o. female with history of Right knee pain. Pain is worse with activity and weight bearing.  Patient has experienced interference of activities of daily living due to decreased range of motion and an increase in joint pain and swelling.  Patient has failed non-operative treatment including NSAIDs and activity modification.  Alivia Thomas currently ambulates independently.      Relevant medical conditions of significance in perioperative period:  Type 2 DM- on medication managed by pcp  HTN- on medication managed by pcp  Chronic eliquis use for afib with h/o cva- managed by pcp      Given documented medical comorbidities including those listed above and based off of CMS criteria patient would meet inpatient admission status guidelines. This case has been approved as inpatient.     Procedure(s) (LRB):  REVISION, ARTHROPLASTY, KNEE: RIGHT (Right)      Hospital Course: Patient presented for above procedure.  Tolerated it well and was discharged home POD3 after voiding, tolerating diet, ambulating, pain controlled. Discharge instructions, follow-up appointment, and med rec are below.    Patient had episodes of hypotension on POD1 that improved with medical co-management on midodrine. She has SBP has been stable since discontinuing midodrine this morning. Per medicine, hold metoprolol on DC until BP is over 120/80.       Consults (From admission, onward)          Status Ordering Provider     Inpatient consult to Critical Care Medicine  Once        Provider:  (Not yet assigned)    Completed YOLANDA FERNANDEZ     Inpatient consult to  "Hospital Medicine-General  Once        Provider:  (Not yet assigned)    Completed YOLANDA FERNANDEZ     Inpatient consult to Pain Management  Once        Provider:  (Not yet assigned)    Acknowledged MAHOGANY GONSALES     Inpatient consult to Respiratory Care  Once        Provider:  (Not yet assigned)    Acknowledged MAHOGANY GONSALES     Inpatient consult to Respiratory Care  Once        Provider:  (Not yet assigned)    Acknowledged MAHOGANY GONSALES     Inpatient consult to Respiratory Care  Once        Provider:  (Not yet assigned)    Acknowledged MAHOGANY GONSALES     Inpatient consult to Social Work  Once        Provider:  (Not yet assigned)    Acknowledged MAHOGANY GONSALES            Significant Diagnostic Studies: No pertinent studies.    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] 11/04/2014 Not Applicable    Postprocedural hypotension [I95.81] 07/20/2024 No    History of right MCA stroke [Z86.73] 03/29/2024 Not Applicable    Paroxysmal atrial fibrillation [I48.0] 03/19/2024 Yes    BMI 40.0-44.9, adult [Z68.41] 06/15/2022 Not Applicable    Hyperlipemia [E78.5] 10/01/2012 Yes     Chronic    Primary hypertension [I10] 08/21/2012 Yes    PADDY (obstructive sleep apnea) [G47.33]  Yes     Chronic      Problems Resolved During this Admission:      Discharged Condition: good    Disposition: Home-Health Care [facility] [place]    Follow Up: 8/2/24    Patient Instructions:      WALKER FOR HOME USE     Order Specific Question Answer Comments   Type of Walker: Adult (5'4"-6'6")    With wheels? Yes    Height: 5' 5" (1.651 m)    Weight: 116 kg (255 lb 11.7 oz)    Length of need (1-99 months): 99    Please " "check all that apply: Patient's condition impairs ambulation.      COMMODE FOR HOME USE     Order Specific Question Answer Comments   Type: Standard    Height: 5' 5" (1.651 m)    Weight: 116 kg (255 lb 11.7 oz)    Length of need (1-99 months): 99      Diet general     Activity as tolerated     Sponge bath only until clinic visit     Keep surgical extremity elevated     Lifting restrictions   Order Comments: No strenuous exercise or lifting of > 10 lbs     Weight bearing as tolerated     No driving, operating heavy equipment or signing legal documents while taking pain medication     Leave dressing on - Keep it clean, dry, and intact until clinic visit   Order Comments: Do not remove surgical dressing for 2 weeks post-op. This will be done only by MD at initial post-op visit. If dressing is completely saturated, replace with identical dressing - silver-impregnated hydrocolloid dressing.     Do not get dressings wet. Do not shower.     If dressing continues to be saturated or there are signs of infection, please call [facility] [phone] for further instructions and to make appt to be seen.     Call MD for:  temperature >100.4     Call MD for:  persistent nausea and vomiting     Call MD for:  severe uncontrolled pain     Call MD for:  difficulty breathing, headache or visual disturbances     Call MD for:  redness, tenderness, or signs of infection (pain, swelling, redness, odor or green/yellow discharge around incision site)     Call MD for:  hives     Call MD for:  persistent dizziness or light-headedness     Call MD for:  extreme fatigue     Call MD for:  temperature >100.4     Call MD for:  persistent nausea and vomiting     Call MD for:  severe uncontrolled pain     Call MD for:  difficulty breathing, headache or visual disturbances     Call MD for:  redness, tenderness, or signs of infection (pain, swelling, redness, odor or green/yellow discharge around incision site)     Call MD for:  hives     Call MD " for:  persistent dizziness or light-headedness     Call MD for:  extreme fatigue     Leave dressing on - Keep it clean, dry, and intact until clinic visit     Weight bearing as tolerated   Order Comments: Patient is to remain in hinged knee brace, WBAT.     Medications:  Reconciled Home Medications:      Medication List        START taking these medications      blood pressure monitor Kit  Commonly known as: BLOOD PRESSURE KIT  1 kit by Misc.(Non-Drug; Combo Route) route Daily. Check blood pressure daily            CHANGE how you take these medications      colchicine 0.6 mg Cap  Commonly known as: MITIGARE  One daily as needed for gout flare  What changed: additional instructions     ELIQUIS 5 mg Tab  Generic drug: apixaban  Take 1 tablet (5 mg total) by mouth 2 (two) times daily.  What changed: Another medication with the same name was removed. Continue taking this medication, and follow the directions you see here.     metoprolol succinate 25 MG 24 hr tablet  Commonly known as: TOPROL-XL  Take 1 tablet (25 mg total) by mouth once daily. HOLD on discharge until BP is consistently >120/80  What changed: additional instructions     oxyCODONE 10 mg Tab immediate release tablet  Commonly known as: ROXICODONE  Take 1 tablet (10 mg total) by mouth every 4 (four) hours as needed for Pain.  What changed:   medication strength  how much to take  how to take this  when to take this  reasons to take this  additional instructions            CONTINUE taking these medications      acetaminophen 650 MG Tbsr  Commonly known as: TYLENOL  Take 1 tablet (650 mg total) by mouth every 8 (eight) hours.     albuterol 90 mcg/actuation inhaler  Commonly known as: VENTOLIN HFA  INHALE TWO PUFFS INTO LUNGS EVERY 4 TO 6 HOURS AS NEEDED FOR SHORTNESS OF BREATH AND FOR WHEEZING     allopurinoL 300 MG tablet  Commonly known as: ZYLOPRIM  Take 1 tablet (300 mg total) by mouth 2 (two) times daily.     atorvastatin 80 MG tablet  Commonly known as:  LIPITOR  Take 1 tablet (80 mg total) by mouth once daily.     b complex vitamins tablet  Take 1 tablet by mouth every other day.     ISIAH-CITRATE ORAL  Take 500 mg by mouth once daily.     cefadroxil 500 MG Cap  Commonly known as: DURICEF  Take 1 capsule (500 mg total) by mouth every 12 (twelve) hours. for 7 days     celecoxib 200 MG capsule  Commonly known as: CeleBREX  Take 1 capsule (200 mg total) by mouth once daily.     cyanocobalamin 1000 MCG tablet  Commonly known as: VITAMIN B-12  Take 100 mcg by mouth every morning.     diclofenac sodium 1 % Gel  Commonly known as: VOLTAREN  Apply 2 g topically 4 (four) times daily as needed. To painful area on the feet.     FLUoxetine 40 MG capsule  Take 1 capsule (40 mg total) by mouth once daily.     fluticasone propionate 50 mcg/actuation nasal spray  Commonly known as: FLONASE  1 SPRAY BY EACH NOSTRIL ROUTE 2 TIMES DAILY AS NEEDED FOR RHINITIS.     LIDOcaine 5 % Oint ointment  Commonly known as: XYLOCAINE  APPLY TOPICALLY AS NEEDED.     methocarbamoL 750 MG Tab  Commonly known as: ROBAXIN  Take 1 tablet (750 mg total) by mouth 4 (four) times daily as needed (for muscle spasms).     multivit-iron-min-folic acid 3,500-18-0.4 unit-mg-mg Chew  Take 1 tablet by mouth 2 (two) times daily.     mupirocin 2 % ointment  Commonly known as: BACTROBAN  Apply to each nostril/nasal route 2 (two) times daily for 5 days     nystatin powder  Commonly known as: MYCOSTATIN  Apply topically 2 (two) times daily.     oxybutynin 5 MG Tr24  Commonly known as: DITROPAN-XL  Take 1 tablet (5 mg total) by mouth once daily.     OZEMPIC 1 mg/dose (4 mg/3 mL)  Generic drug: semaglutide  Inject 1 mg into the skin every 7 days.     pantoprazole 40 MG tablet  Commonly known as: PROTONIX  Take 1 tablet (40 mg total) by mouth once daily.     prednisoLONE acetate 1 % Drps  Commonly known as: PRED FORTE  Place 1 drop into the right eye 3 (three) times daily.     senna-docusate 8.6-50 mg 8.6-50 mg per  tablet  Commonly known as: SENNA WITH DOCUSATE SODIUM  Take 1 tablet by mouth once daily.     urea 40 % Crea  Commonly known as: CARMOL  Apply topically once daily. To dry skin on the feet.     vitamin D 1000 units Tab  Commonly known as: VITAMIN D3  Take 5,000 mg by mouth every morning.            STOP taking these medications      aspirin 81 MG EC tablet  Commonly known as: ECOTRIN     omeprazole 20 MG capsule  Commonly known as: PRILOSEC     oxyCODONE-acetaminophen  mg per tablet  Commonly known as: PERCOCET     tretinoin microspheres 0.08 % Glwp  Commonly known as: RETIN-A MICRO PUMP              YOLANDA FERNANDEZ MD  Orthopedics  [facility] - Surgery

## 2024-07-21 NOTE — PROGRESS NOTES
[facility] Medicine  Progress Note    Patient Name: Alivia Thomas  MRN: [id number]  Patient Class: IP- Inpatient   Admission Date: 7/18/2024  Length of Stay: 3 days  Attending Physician: Theodore Swan MD  Primary Care Provider: Clarisse Richardson MD        Subjective:     Principal Problem:Failed total right knee replacement        HPI:  Alivia Thomas is a 58 year old woman with history of R MCA ischemic stroke (2024 s/p TnK with no residual deficits), T2DM, PADDY, BMI > 40 s/p gastric sleeve, and chronic back and knee pain, who presents after revision of R TKA on 7/18/24. Patient with prior R TKA done around 2009/2010, with revision in 2014, however now with continued and progressively worsening pain limiting her daily activities. Underwent evaluation for revision of Right knee prosthetic and is s/p revision on 7/18. Operative findings included osteolysis on mainly in the lateral femoral condyle with pitting wear of the tibial insert. No complications during the case. The following day, on 7/19, patient noted to be persistently hypotensive, with charted BP as low as 61/37. She received approximately 4.7L IVF through 2L boluses and continuous mIVF running at 125 cc/hr from the prior day. Patient with only modest improvement in BP up to 96/50. Despite persistent hypotension, patient remained largely asymptomatic, though reported feeling sleepy at times. MICU was consulted and felt that as patient was asymptomatic and with no signs of end-organ damage, no need to escalate care to ICU at this time. Labs with normal lactates, Cr, and troponin. VBG with pH 7.26, pCO2 47, bicarb 21.6. CXR unremarkable. Denied any chest pain, SOB, abdominal pain, fevers, chills/rigors, or dysuria.    Overview/Hospital Course:      Interval history- seen this morning and she had just cleaned up in bathroom and done hygiene and washed her face, brushed her hair, etc and feels better after this and ready for  discharge today. Bp has been stable since midodrine stopped after dose mid morning (did not get 2 pm dose) and in 110s-120s now. Discussed holding metoprolol at home unless consistently over 120 systolic. She does not have home bp cuff and sent one to pharmacy to see if can get delivered to bedside for dsicharge, if unabel to do so I told her can hold until her cilnic follow ups and then can resume if Bp is over 120 systolic then, as she was on it for paroxysml afib but is in NSR during this stay with HR controlled. Updated ortho on above and they plan for discharge today with ortho follow up.    Review of patient's allergies indicates:   Allergen Reactions    Strawberry Anaphylaxis       Current Facility-Administered Medications on File Prior to Encounter   Medication    0.9%  NaCl infusion    0.9%  NaCl infusion    0.9%  NaCl infusion     Current Outpatient Medications on File Prior to Encounter   Medication Sig    albuterol (VENTOLIN HFA) 90 mcg/actuation inhaler INHALE TWO PUFFS INTO LUNGS EVERY 4 TO 6 HOURS AS NEEDED FOR SHORTNESS OF BREATH AND FOR WHEEZING    allopurinoL (ZYLOPRIM) 300 MG tablet Take 1 tablet (300 mg total) by mouth 2 (two) times daily.    atorvastatin (LIPITOR) 80 MG tablet Take 1 tablet (80 mg total) by mouth once daily.    b complex vitamins tablet Take 1 tablet by mouth every other day.     calcium citrate/vitamin D2 (ISIAH-CITRATE ORAL) Take 500 mg by mouth once daily.    colchicine (MITIGARE) 0.6 mg Cap One daily as needed for gout flare (Patient taking differently: One daily)    cyanocobalamin (VITAMIN B-12) 1000 MCG tablet Take 100 mcg by mouth every morning.    diclofenac sodium (VOLTAREN) 1 % Gel Apply 2 g topically 4 (four) times daily as needed. To painful area on the feet.    fluticasone propionate (FLONASE) 50 mcg/actuation nasal spray 1 SPRAY BY EACH NOSTRIL ROUTE 2 TIMES DAILY AS NEEDED FOR RHINITIS.    LIDOcaine (XYLOCAINE) 5 % Oint ointment APPLY TOPICALLY AS NEEDED.     MULTIVIT-IRON-MIN-FOLIC ACID 3,500-18-0.4 UNIT-MG-MG ORAL CHEW Take 1 tablet by mouth 2 (two) times daily.     nystatin (MYCOSTATIN) powder Apply topically 2 (two) times daily.    oxybutynin (DITROPAN-XL) 5 MG TR24 Take 1 tablet (5 mg total) by mouth once daily.    prednisoLONE acetate (PRED FORTE) 1 % DrpS Place 1 drop into the right eye 3 (three) times daily.    urea (CARMOL) 40 % Crea Apply topically once daily. To dry skin on the feet.    vitamin D 185 MG Tab Take 5,000 mg by mouth every morning.     [DISCONTINUED] metoprolol succinate (TOPROL-XL) 25 MG 24 hr tablet Take 1 tablet (25 mg total) by mouth once daily.    apixaban (ELIQUIS) 5 mg Tab Take 1 tablet (5 mg total) by mouth 2 (two) times daily.    FLUoxetine 40 MG capsule Take 1 capsule (40 mg total) by mouth once daily.    semaglutide (OZEMPIC) 1 mg/dose (4 mg/3 mL) Inject 1 mg into the skin every 7 days.     Family History       Problem Relation (Age of Onset)    Cancer Sister    Cataracts Mother, Father    Coronary artery disease Brother    Diabetes Mother, Father, Sister, Brother, Brother    Hypertension Sister    Hypotension Brother, Brother    Kidney failure Brother    No Known Problems Sister          Tobacco Use    Smoking status: Never    Smokeless tobacco: Never   Substance and Sexual Activity    Alcohol use: Not Currently     Comment: occasionally     Drug use: No    Sexual activity: Yes     Partners: Female     Birth control/protection: None     Review of Systems   Constitutional:  Negative for chills and fever.   HENT:  Negative for congestion and sore throat.    Eyes:  Negative for photophobia and visual disturbance.   Respiratory:  Negative for cough and shortness of breath.    Cardiovascular:  Negative for chest pain and palpitations.   Gastrointestinal:  Negative for abdominal pain and diarrhea.   Genitourinary:  Negative for dysuria and hematuria.   Musculoskeletal:  Positive for arthralgias and back pain. Negative for gait problem.    Skin:  Negative for rash and wound.   Neurological:  Negative for syncope and headaches.   Psychiatric/Behavioral:  Negative for agitation and behavioral problems.      Objective:     Vital Signs (Most Recent):  Temp: 98.8 °F (37.1 °C) (07/21/24 0702)  Pulse: 102 (07/21/24 0702)  Resp: 17 (07/21/24 0934)  BP: 120/62 (07/21/24 0406)  SpO2: 95 % (07/21/24 0915) Vital Signs (24h Range):  Temp:  [97.4 °F (36.3 °C)-99.5 °F (37.5 °C)] 98.8 °F (37.1 °C)  Pulse:  [] 102  Resp:  [16-20] 17  SpO2:  [91 %-97 %] 95 %  BP: (108-120)/(55-68) 120/62     Weight: 116 kg (255 lb 11.7 oz)  Body mass index is 42.56 kg/m².     Physical Exam  Vitals reviewed.   Constitutional:       General: She is not in acute distress.     Appearance: Normal appearance. She is obese.      Comments: Ambulating back to bed during exam   HENT:      Head: Normocephalic and atraumatic.      Nose: No congestion or rhinorrhea.      Mouth/Throat:      Mouth: Mucous membranes are moist.      Pharynx: Oropharynx is clear.   Eyes:      Extraocular Movements: Extraocular movements intact.      Pupils: Pupils are equal, round, and reactive to light.   Cardiovascular:      Rate and Rhythm: Normal rate and regular rhythm.      Pulses: Normal pulses.      Heart sounds: Normal heart sounds.   Pulmonary:      Effort: Pulmonary effort is normal. No respiratory distress.      Breath sounds: Normal breath sounds.   Abdominal:      General: Bowel sounds are normal.      Palpations: Abdomen is soft.      Tenderness: There is no abdominal tenderness.   Musculoskeletal:         General: No swelling or tenderness.      Comments: R knee in brace  Knee pain   Skin:     Findings: No bruising or rash.   Neurological:      General: No focal deficit present.      Mental Status: She is alert and oriented to person, place, and time.   Psychiatric:         Mood and Affect: Mood normal.         Behavior: Behavior normal.          Significant Labs: All pertinent labs within the  past 24 hours have been reviewed.    Significant Imaging: I have reviewed all pertinent imaging results/findings within the past 24 hours.    Assessment/Plan:      * Failed total right knee replacement  S/p R TKA Revision on 7/18/24.    - Management per primary Orthopedics team  - home with HH today      Postprocedural hypotension  58F w/hx of DJD of knees and back s/p R TKA revision on 7/18. Developed persistent hypotension on 7/19 that only modestly improved BP with borderline MAPs that persisted throughout the day. Initial labs without evidence of end organ hypoperfusion---serial lactates negative, Cr baseline, trop negative, and mentation is intact. MICU was consulted overnight and performed bedside echo that was without signs of decreased contractility or RV strain. On exam, pt perfusing extremities appropriately and no evidence of active bleeding. No other clinical signs to indicate infection with normal WBC and no fevers. Given the above, less concern for shock. Suspect hypotension is multifactorial with the following: procedural anesthesia, metoprolol XR given earlier on 7/18 while patient was already hypotensive, post-operative analgesia with opioid medications.    - see hypotension above  -hold metoprolol on dc until bp >120 consistently. BP cuff on dc ordered if able to get from pharmacy  -resolved, off midodrine >24 hours. Only on briefly on 7/19 overnight to 7/20 AM    History of right MCA stroke  Hx of stroke in 2024. TNK aborted R hemispheric ischemic stroke (MR-negative) 1/4/24.  Etiology likely PAF     - Continue Atorvastatin 80  - Continue Eliquis 5 BID    Paroxysmal atrial fibrillation  Dx with pAF after stroke in 2024.    - Continue Eliquis 5 BID  -in NSR now on EKG. Hold metoprolol on dc unless BP systolic >120 consistently and placed on order    BMI 40.0-44.9, adult  Body mass index is 42.56 kg/m². Morbid obesity complicates all aspects of disease management from diagnostic modalities to  treatment. Weight loss encouraged and health benefits explained to patient.         Hyperlipemia  Chronic, stable.    - Continue home statin      Primary hypertension  Chronic, controlled. Latest blood pressure and vitals reviewed-     Temp:  [97.4 °F (36.3 °C)-99.5 °F (37.5 °C)]   Pulse:  []   Resp:  [16-20]   BP: (108-120)/(55-68)   SpO2:  [91 %-97 %] .   Home meds for hypertension were reviewed and noted below.   Hypertension Medications               metoprolol succinate (TOPROL-XL) 25 MG 24 hr tablet Take 1 tablet (25 mg total) by mouth once daily.          - Holding all antihypertensives in setting of hypotension and BP much better in last 24 hours now after hypotension episode with rapid response on 7/19 overnight. Midodrine stopped mdi morning on 7/20 and Bp 110s-120s systolic. Hold metoprolol ond c until BP consistently over 120. BP cuff sent to pharm to try to get for discharge, if unable then I told her to hold until clinic f/u and if bp over 120 then can resume metoprolol as was on for afib and is NSR now    PADDY (obstructive sleep apnea)  Chronic issue. Does not use Home CPAP as she feels claustrophobic. VBG with signs of respiratory acidosis with pH 7.26 and CO2 47.7.    - Discussed importance of using nightly CPAP.  - Avoid opioids/sedating meds if possible      VTE Risk Mitigation (From admission, onward)           Ordered     apixaban tablet 5 mg  2 times daily         07/18/24 1656     Place sequential compression device  Until discontinued         07/18/24 1653     Place foot compression device  Until discontinued         07/18/24 1122                    Discharge Planning   RYAN: 7/21/2024     Code Status: Full Code   Is the patient medically ready for discharge?:     Reason for patient still in hospital (select all that apply): Patient trending condition  Discharge Plan A: Home with family, Home Health                  Mary Lou Luna MD  Department of Hospital Medicine   [facility]  [facility]

## 2024-07-22 ENCOUNTER — TELEPHONE (OUTPATIENT)
Dept: ORTHOPEDICS | Facility: CLINIC | Age: 59
End: 2024-07-22
Payer: COMMERCIAL

## 2024-07-22 ENCOUNTER — OFFICE VISIT (OUTPATIENT)
Dept: ORTHOPEDICS | Facility: CLINIC | Age: 59
End: 2024-07-22
Payer: MEDICARE

## 2024-07-22 DIAGNOSIS — Z96.651 S/P REVISION OF TOTAL KNEE, RIGHT: Primary | ICD-10-CM

## 2024-07-22 PROCEDURE — G0180 MD CERTIFICATION HHA PATIENT: HCPCS | Mod: ,,, | Performed by: NURSE PRACTITIONER

## 2024-07-22 PROCEDURE — 99024 POSTOP FOLLOW-UP VISIT: CPT | Mod: POP,,,

## 2024-07-22 PROCEDURE — 99214 OFFICE O/P EST MOD 30 MIN: CPT | Mod: PBBFAC

## 2024-07-22 PROCEDURE — 99999 PR PBB SHADOW E&M-EST. PATIENT-LVL IV: CPT | Mod: PBBFAC,,,

## 2024-07-22 NOTE — PROGRESS NOTES
Alivia Thomas presents for an unscheduled post-operative visit following a right total knee arthroplasty revision performed by Dr. Swan on 7/18/2024.  She called the total joint line with concerns of saturation of her surgical bandage and was instructed to come in.    Exam:   Last menstrual period 06/26/2018.   Patient presented in wheelchair.  Incision is clean and with dark dried bloody drainage.  No active drainage from the incision.      A/P:  4 days s/p right total knee arthroplasty revision    Post surgical Aquacel removed and replaced with another Aquacel.  She was instructed to keep the Aquacel on until being seen for a 2 week postop visit.  - Continue Eliquis for 1 month post op  - Pain medication:  No refill needed  - Reviewed antibiotic prophylaxis   - Follow up in 2 weeks with Seema Weinberg for new x-rays. Pt will call clinic with problems/concerns.

## 2024-07-22 NOTE — TELEPHONE ENCOUNTER
----- Message from Veronique Santos sent at 7/22/2024  9:49 AM CDT -----  Regarding: VELMA PT WITH HOME HEALTH IS CALL REGARDING PT'S BANDAGE NEEDING TO BE CHANGED  Contact: [facility]  Velma is requesting a call from Yuly regarding pt.       Velam

## 2024-07-22 NOTE — TELEPHONE ENCOUNTER
Called Velma the PT and discussed that per  operative report patient is in a HKB that is set for 0-90 and okay to move within those parameters. Informed her that pt is also up on the 5th floor of the clinic for a dressing change. Velma verbalized understanding and has no further questions.    ----- Message from Jakob Stein Jr., MA sent at 7/22/2024 10:36 AM CDT -----  Regarding: FW: VELMA PT WITH HOME HEALTH IS CALL REGARDING PT'S BANDAGE NEEDING TO BE CHANGED  Contact: Vel[facility]  Due to patients brace is it okay to do knee flexion to 90 degrees. I will call patient regarding dressing according to PT it is saturated and needs to be changed.  ----- Message -----  From: Veronique Santos  Sent: 7/22/2024   9:52 AM CDT  To: [facility] Staff  Subject: VELMA PT WITH HOME HEALTH IS CALL REGARDING#    Velma is requesting a call from Yuly regarding pt.       Velma       normal appearance , without tenderness upon palpation , no deformities , trachea midline , Thyroid normal size , no thyroid nodules , no masses , no JVD , thyroid nontender

## 2024-07-22 NOTE — TELEPHONE ENCOUNTER
----- Message from Radha Stein sent at 7/22/2024 10:51 AM CDT -----  Regarding: Missed Call  Contact: pt @ [phone]  Message is for sandra Robert missed your call to get bandages changed. Asking for a call back

## 2024-07-23 LAB
BACTERIA SPEC AEROBE CULT: NO GROWTH
FINAL PATHOLOGIC DIAGNOSIS: NORMAL
GROSS: NORMAL
Lab: NORMAL
MICROSCOPIC EXAM: NORMAL

## 2024-07-24 ENCOUNTER — PATIENT MESSAGE (OUTPATIENT)
Dept: ADMINISTRATIVE | Facility: CLINIC | Age: 59
End: 2024-07-24
Payer: COMMERCIAL

## 2024-07-24 ENCOUNTER — PATIENT OUTREACH (OUTPATIENT)
Dept: ADMINISTRATIVE | Facility: CLINIC | Age: 59
End: 2024-07-24
Payer: COMMERCIAL

## 2024-07-24 NOTE — PROGRESS NOTES
C3 nurse attempted to contact Alivia Thomas for a TCC post hospital discharge follow up call. No answer. Left voicemail with callback information. The patient does not have a scheduled HOSFU appointment. Message sent to PCP staff for assistance with scheduling visit with patient.

## 2024-07-25 DIAGNOSIS — G89.4 CHRONIC PAIN SYNDROME: ICD-10-CM

## 2024-07-25 DIAGNOSIS — M25.562 CHRONIC PAIN OF BOTH KNEES: ICD-10-CM

## 2024-07-25 DIAGNOSIS — M47.816 SPONDYLOSIS OF LUMBAR REGION WITHOUT MYELOPATHY OR RADICULOPATHY: ICD-10-CM

## 2024-07-25 DIAGNOSIS — M17.0 PRIMARY OSTEOARTHRITIS OF BOTH KNEES: ICD-10-CM

## 2024-07-25 DIAGNOSIS — M25.561 CHRONIC PAIN OF BOTH KNEES: ICD-10-CM

## 2024-07-25 DIAGNOSIS — M51.36 DDD (DEGENERATIVE DISC DISEASE), LUMBAR: ICD-10-CM

## 2024-07-25 DIAGNOSIS — G89.29 CHRONIC PAIN OF BOTH KNEES: ICD-10-CM

## 2024-07-25 LAB
BACTERIA SPEC ANAEROBE CULT: NORMAL

## 2024-07-25 RX ORDER — TIZANIDINE 4 MG/1
4 TABLET ORAL EVERY 8 HOURS PRN
Qty: 90 TABLET | Refills: 2 | Status: SHIPPED | OUTPATIENT
Start: 2024-07-25 | End: 2024-10-23

## 2024-07-25 RX ORDER — OXYCODONE AND ACETAMINOPHEN 10; 325 MG/1; MG/1
1 TABLET ORAL EVERY 8 HOURS PRN
Qty: 90 TABLET | Refills: 0 | Status: SHIPPED | OUTPATIENT
Start: 2024-07-25

## 2024-07-25 NOTE — TELEPHONE ENCOUNTER
----- Message from Neyda Galicia sent at 7/25/2024  8:20 AM CDT -----  Regarding: refill  Name of caller: violetta       What is the requesting detail: pt is requesting a refill for percocet 10 mg. Please advise       [facility] - [place], [place] - [address]          Can the clinic reply by [facility]:       What number to call back: [phone]

## 2024-07-25 NOTE — TELEPHONE ENCOUNTER
Patient requesting refill on PERCOCET   Last office visit 5/29/24   shows last refill on 6/26/24  Patient does not have a pain contract on file with [facility] department  Patient last UDS 5/29/24 was not consistent with current therapy     CODEINE   Not Detected   Not Detected   Not Detected Not Detected     Comment: INTERPRETIVE INFORMATION: Codeine, U  Positive Cutoff: 40 ng/mL  Methodology: Mass Spectrometry   MORPHINE   Not Detected   Not Detected   Not Detected Not Detected     Comment: INTERPRETIVE INFORMATION:Morphine, U  Positive Cutoff: 20 ng/mL  Methodology: Mass Spectrometry   6-ACETYLMORPHINE   Not Detected   Not Detected   Not Detected Not Detected     Comment: INTERPRETIVE INFORMATION:6-acetylmorphine, U  Positive Cutoff: 20 ng/mL  Methodology: Mass Spectrometry   OXYCODONE   Not Detected   Present   Present Present     Comment: INTERPRETIVE INFORMATION:Oxycodone, U  Positive Cutoff: 40 ng/mL  Methodology: Mass Spectrometry   NOROYXCODONE   Present   Present   Present Present     Comment: INTERPRETIVE INFORMATION:Noroxycodone, U  Positive Cutoff: 100 ng/mL  Methodology: Mass Spectrometry   OXYMORPHONE   Not Detected   Present   Present Present     Comment: INTERPRETIVE INFORMATION:Oxymorphone, U  Positive Cutoff: 40 ng/mL  Methodology: Mass Spectrometry   NOROXYMORPHONE   Not Detected   Not Detected   Not Detected Not Detected     Comment: INTERPRETIVE INFORMATION:Noroxymorphone, U  Positive Cutoff: 100 ng/mL  Methodology: Mass Spectrometry   HYDROCODONE   Not Detected   Not Detected   Not Detected Not Detected     Comment: INTERPRETIVE INFORMATION:Hydrocodone, U  Positive Cutoff: 40 ng/mL  Methodology: Mass Spectrometry   NORHYDROCODONE   Not Detected   Not Detected   Not Detected Not Detected     Comment: INTERPRETIVE INFORMATION:Norhydrocodone, U  Positive Cutoff: 100 ng/mL  Methodology: Mass Spectrometry   HYDROMORPHONE   Not Detected   Not Detected   Not Detected Not  Detected     Comment: INTERPRETIVE INFORMATION:Hydromorphone, U  Positive Cutoff: 20 ng/mL  Methodology: Mass Spectrometry   BUPRENORPHINE   Not Detected   Not Detected   Not Detected Not Detected     Comment: INTERPRETIVE INFORMATION:Buprenorphine, U  Positive Cutoff: 5 ng/mL  Methodology: Mass Spectrometry   NORUBPRENORPHINE   Not Detected   Not Detected   Not Detected Not Detected     Comment: INTERPRETIVE INFORMATION:Norbuprenorphine, U  Positive Cutoff: 20 ng/mL  Methodology: Mass Spectrometry   FENTANYL   Not Detected   Not Detected   Not Detected Not Detected     Comment: INTERPRETIVE INFORMATION:Fentanyl, U  Positive Cutoff: 2 ng/mL  Methodology: Mass Spectrometry   NORFENTANYL   Not Detected   Not Detected   Not Detected Not Detected     Comment: INTERPRETIVE INFORMATION:Norfentanyl, U  Positive Cutoff: 2 ng/mL  Methodology: Mass Spectrometry   MEPERIDINE METABOLITE   Not Detected   Not Detected   Not Detected Not Detected     Comment: INTERPRETIVE INFORMATION:Meperidine metabolite, U  Positive Cutoff: 50 ng/mL  Methodology: Mass Spectrometry   TAPENTADOL   Not Detected   Not Detected   Not Detected Not Detected     Comment: INTERPRETIVE INFORMATION:Tapentadol, U  Positive Cutoff: 100 ng/mL  Methodology: Mass Spectrometry   TAPENTADOL-O-SULF   Not Detected   Not Detected   Not Detected Not Detected     Comment: INTERPRETIVE INFORMATION:Tapentadol-o-Sulf, U  Positive Cutoff: 200 ng/mL  Methodology: Mass Spectrometry   METHADONE   Negative   Not Detected   Not Detected Not Detected     Comment: Presumptive negative by immunoassay. Testing by mass  spectrometry is available on request.  INTERPRETIVE INFORMATION: Methadone Screen, U  Positive Cutoff: 150 ng/mL  Methodology: Immunoassay   TRAMADOL   Negative   Not Detected   Not Detected Not Detected     Comment: Presumptive negative by immunoassay. Testing by mass  spectrometry is available on request.  INTERPRETIVE INFORMATION:Tramadol Screen, U  Positive  Cutoff: 100 ng/mL  Methodology: Immunoassay   AMPHETAMINE   Not Detected   Not Detected   Not Detected Not Detected     Comment: INTERPRETIVE INFORMATION:Amphetamine, U  Positive Cutoff: 50 ng/mL  Methodology: Mass Spectrometry   METHAMPHETAMINE   Not Detected   Not Detected   Not Detected Not Detected     Comment: INTERPRETIVE INFORMATION:Methamphetamine, U  Positive Cutoff: 200 ng/mL  Methodology: Mass Spectrometry   MDMA- ECSTASY   Not Detected   Not Detected   Not Detected Not Detected     Comment: INTERPRETIVE INFORMATION:MDMA, U  Positive Cutoff: 200 ng/mL  Methodology: Mass Spectrometry   MDA   Not Detected   Not Detected   Not Detected Not Detected     Comment: INTERPRETIVE INFORMATION:MDA, U  Positive Cutoff: 200 ng/mL  Methodology: Mass Spectrometry   MDEA- Kait   Not Detected   Not Detected   Not Detected Not Detected     Comment: INTERPRETIVE INFORMATION:MDEA, U  Positive Cutoff: 200 ng/mL  Methodology: Mass Spectrometry   METHYLPHENIDATE   Not Detected   Not Detected   Not Detected Not Detected     Comment: INTERPRETIVE INFORMATION:Methylphenidate, U  Positive Cutoff: 100 ng/mL  Methodology: Mass Spectrometry   PHENTERMINE   Not Detected   Not Detected   Not Detected Not Detected     Comment: INTERPRETIVE INFORMATION:Phentermine, U  Positive Cutoff: 100 ng/mL  Methodology: Mass Spectrometry   BENZOYLECGONINE   Negative   Not Detected   Not Detected Not Detected     Comment: Presumptive negative by immunoassay. Testing by mass  spectrometry is available on request.  INTERPRETIVE INFORMATION:Cocaine Screen, U  Positive Cutoff: 150 ng/mL  Methodology: Immunoassay   ALPRAZOLAM   Not Detected   Not Detected   Not Detected Not Detected     Comment: INTERPRETIVE INFORMATION:Alprazolam, U  Positive Cutoff: 40 ng/mL  Methodology: Mass Spectrometry   ALPHA-OH-ALPRAZOLAM   Not Detected   Not Detected   Not Detected Not Detected     Comment: INTERPRETIVE INFORMATION:Alpha-OH-Alprazolam, U  Positive Cutoff: 20  ng/mL  Methodology: Mass Spectrometry   CLONAZEPAM   Not Detected   Not Detected   Not Detected Not Detected     Comment: INTERPRETIVE INFORMATION:Clonazepam, U  Positive Cutoff: 20 ng/mL  Methodology: Mass Spectrometry   7-AMINOCLONAZEPAM   Not Detected   Not Detected   Not Detected Not Detected     Comment: INTERPRETIVE INFORMATION:7-Aminoclonazepam, U  Positive Cutoff: 40 ng/mL  Methodology: Mass Spectrometry   DIAZEPAM   Not Detected   Not Detected   Not Detected Not Detected     Comment: INTERPRETIVE INFORMATION:Diazepam, U  Positive Cutoff: 50 ng/mL  Methodology: Mass Spectrometry   NORDIAZEPAM   Not Detected   Not Detected   Not Detected Not Detected     Comment: INTERPRETIVE INFORMATION:Nordiazepam, U  Positive Cutoff: 50 ng/mL  Methodology: Mass Spectrometry   OXAZEPAM   Not Detected   Not Detected   Not Detected Not Detected     Comment: INTERPRETIVE INFORMATION:Oxazepam, U  Positive Cutoff: 50 ng/mL  Methodology: Mass Spectrometry   TEMAZEPAM   Not Detected   Not Detected   Not Detected Not Detected     Comment: INTERPRETIVE INFORMATION:Temazepam, U  Positive Cutoff: 50 ng/mL  Methodology: Mass Spectrometry   Lorazepam   Not Detected   Not Detected   Not Detected Not Detected     Comment: INTERPRETIVE INFORMATION:Lorazepam, U  Positive Cutoff: 60 ng/mL  Methodology: Mass Spectrometry   MIDAZOLAM   Not Detected   Not Detected   Not Detected Not Detected     Comment: INTERPRETIVE INFORMATION:Midazolam, U  Positive Cutoff: 20 ng/mL  Methodology: Mass Spectrometry   ZOLPIDEM   Not Detected   Not Detected   Not Detected Not Detected     Comment: INTERPRETIVE INFORMATION:Zolpidem, U  Positive Cutoff: 20 ng/mL  Methodology: Mass Spectrometry   BARBITURATES   Negative   Not Detected   Not Detected Not Detected     Comment: Presumptive negative by immunoassay. Testing by mass  spectrometry is available on request.  INTERPRETIVE INFORMATION:Barbiturates Screen, U  Positive Cutoff: 200 ng/mL  Methodology:  Immunoassay   Creatinine, Urine 20.0 - 400.0 mg/dL 76.2 117.0 R 61.1 66.0 R 112.3 93.0 86.0 R, CM   ETHYL GLUCURONIDE   Negative   Not Detected   Not Detected Not Detected     Comment: Presumptive negative by immunoassay. Testing by mass  spectrometry is available on request.  INTERPRETIVE INFORMATION:Ethyl Glucuronide Screen, U  Positive Cutoff: 500 ng/mL  Methodology: Immunoassay   MARIJUANA METABOLITE   Negative   Not Detected CM   Not Detected Not Detected     Comment: Presumptive negative by immunoassay. Testing by mass  spectrometry is available on request.  INTERPRETIVE INFORMATION: THC (Cannabinoids) Screen, U  Positive Cutoff: 50 ng/mL  Methodology: Immunoassay   PCP   Negative   Not Detected   Not Detected Not Detected     Comment: Presumptive negative by immunoassay. Testing by mass  spectrometry is available on request.  INTERPRETIVE INFORMATION:Phencyclidine Screen, U  Positive Cutoff: 25 ng/mL  Methodology: Immunoassay   CARISOPRODOL   Negative   Not Detected CM   Not Detected CM Not Detected CM     Comment: Presumptive negative by immunoassay. Testing by mass  spectrometry is available on request.  INTERPRETIVE INFORMATION: Carisoprodol Screen, U  Positive Cutoff: 100 ng/mL  Methodology: Immunoassay  The carisoprodol immunoassay has cross-reactivity to  carisoprodol and meprobamate.   Naloxone   Not Detected   Not Detected   Not Detected Not Detected     Comment: INTERPRETIVE INFORMATION:Naloxone, U  Positive Cutoff: 100 ng/mL  Methodology: Mass Spectrometry   Gabapentin   Not Detected   Not Detected   Not Detected Not Detected     Comment: INTERPRETIVE INFORMATION:Gabapentin, U  Positive Cutoff: 3,000 ng/mL  Methodology: Mass Spectrometry   Pregabalin   Not Detected   Not Detected   Not Detected Not Detected     Comment: INTERPRETIVE INFORMATION:Pregabalin, U  Positive Cutoff: 3,000 ng/mL  Methodology: Mass Spectrometry   Alpha-OH-Midazolam   Not Detected   Not Detected   Not Detected Not Detected      Comment: INTERPRETIVE INFORMATION:Alpha-OH-Midazolam, U  Positive Cutoff: 20 ng/mL  Methodology: Mass Spectrometry   Zolpidem Metabolite   Not Detected   Not Detected   Not Detected Not Detected     Comment: INTERPRETIVE INFORMATION:Zolpidem Metabolite, U  Positive Cutoff: 100 ng/mL  Methodology: Mass Spectrometry

## 2024-08-01 ENCOUNTER — DOCUMENT SCAN (OUTPATIENT)
Dept: HOME HEALTH SERVICES | Facility: HOSPITAL | Age: 59
End: 2024-08-01
Payer: COMMERCIAL

## 2024-08-02 ENCOUNTER — OFFICE VISIT (OUTPATIENT)
Dept: ORTHOPEDICS | Facility: CLINIC | Age: 59
End: 2024-08-02
Payer: MEDICARE

## 2024-08-02 VITALS — HEIGHT: 65 IN | BODY MASS INDEX: 42.61 KG/M2 | WEIGHT: 255.75 LBS

## 2024-08-02 DIAGNOSIS — Z96.651 S/P TOTAL KNEE REPLACEMENT, RIGHT: Primary | ICD-10-CM

## 2024-08-02 DIAGNOSIS — T84.019D PROSTHETIC JOINT IMPLANT FAILURE, SUBSEQUENT ENCOUNTER: ICD-10-CM

## 2024-08-02 PROCEDURE — 99999 PR PBB SHADOW E&M-EST. PATIENT-LVL IV: CPT | Mod: PBBFAC,,, | Performed by: NURSE PRACTITIONER

## 2024-08-02 PROCEDURE — 99024 POSTOP FOLLOW-UP VISIT: CPT | Mod: POP,,, | Performed by: NURSE PRACTITIONER

## 2024-08-02 PROCEDURE — 99214 OFFICE O/P EST MOD 30 MIN: CPT | Mod: PBBFAC | Performed by: NURSE PRACTITIONER

## 2024-08-04 ENCOUNTER — HOSPITAL ENCOUNTER (OUTPATIENT)
Facility: HOSPITAL | Age: 59
Discharge: HOME | End: 2024-08-05
Attending: STUDENT IN AN ORGANIZED HEALTH CARE EDUCATION/TRAINING PROGRAM | Admitting: STUDENT IN AN ORGANIZED HEALTH CARE EDUCATION/TRAINING PROGRAM
Payer: MEDICARE

## 2024-08-04 DIAGNOSIS — R53.83 FATIGUE, UNSPECIFIED TYPE: ICD-10-CM

## 2024-08-04 DIAGNOSIS — I95.9 HYPOTENSION, UNSPECIFIED HYPOTENSION TYPE: Primary | ICD-10-CM

## 2024-08-04 DIAGNOSIS — R29.818 ACUTE FOCAL NEUROLOGICAL DEFICIT: ICD-10-CM

## 2024-08-04 DIAGNOSIS — I95.9 HYPOTENSION: ICD-10-CM

## 2024-08-04 DIAGNOSIS — R07.9 CHEST PAIN: ICD-10-CM

## 2024-08-04 DIAGNOSIS — R29.810 FACIAL DROOP: ICD-10-CM

## 2024-08-04 LAB
ABO + RH BLD: NORMAL
ALBUMIN SERPL BCP-MCNC: 3.1 G/DL (ref 3.5–5.2)
ALLENS TEST: NORMAL
ALP SERPL-CCNC: 57 U/L (ref 55–135)
ALT SERPL W/O P-5'-P-CCNC: 10 U/L (ref 10–44)
ANION GAP SERPL CALC-SCNC: 6 MMOL/L (ref 8–16)
APTT PPP: 29.2 SEC (ref 21–32)
AST SERPL-CCNC: 13 U/L (ref 10–40)
BASOPHILS # BLD AUTO: 0.06 K/UL (ref 0–0.2)
BASOPHILS NFR BLD: 0.6 % (ref 0–1.9)
BILIRUB SERPL-MCNC: 0.7 MG/DL (ref 0.1–1)
BILIRUB UR QL STRIP: NEGATIVE
BLD GP AB SCN CELLS X3 SERPL QL: NORMAL
BUN SERPL-MCNC: 19 MG/DL (ref 6–20)
CALCIUM SERPL-MCNC: 9.7 MG/DL (ref 8.7–10.5)
CHLORIDE SERPL-SCNC: 109 MMOL/L (ref 95–110)
CHOLEST SERPL-MCNC: 132 MG/DL (ref 120–199)
CHOLEST/HDLC SERPL: 3.3 {RATIO} (ref 2–5)
CLARITY UR REFRACT.AUTO: CLEAR
CO2 SERPL-SCNC: 26 MMOL/L (ref 23–29)
COLOR UR AUTO: YELLOW
CREAT SERPL-MCNC: 0.9 MG/DL (ref 0.5–1.4)
DIFFERENTIAL METHOD BLD: ABNORMAL
EOSINOPHIL # BLD AUTO: 0.3 K/UL (ref 0–0.5)
EOSINOPHIL NFR BLD: 3.1 % (ref 0–8)
ERYTHROCYTE [DISTWIDTH] IN BLOOD BY AUTOMATED COUNT: 16 % (ref 11.5–14.5)
EST. GFR  (NO RACE VARIABLE): >60 ML/MIN/1.73 M^2
GLUCOSE SERPL-MCNC: 119 MG/DL (ref 70–110)
GLUCOSE UR QL STRIP: NEGATIVE
HCT VFR BLD AUTO: 32.4 % (ref 37–48.5)
HDLC SERPL-MCNC: 40 MG/DL (ref 40–75)
HDLC SERPL: 30.3 % (ref 20–50)
HGB BLD-MCNC: 9.9 G/DL (ref 12–16)
HGB UR QL STRIP: NEGATIVE
IMM GRANULOCYTES # BLD AUTO: 0.07 K/UL (ref 0–0.04)
IMM GRANULOCYTES NFR BLD AUTO: 0.7 % (ref 0–0.5)
INR PPP: 1.1 (ref 0.8–1.2)
KETONES UR QL STRIP: NEGATIVE
LDH SERPL L TO P-CCNC: 1.32 MMOL/L (ref 0.5–2.2)
LDLC SERPL CALC-MCNC: 81.6 MG/DL (ref 63–159)
LEUKOCYTE ESTERASE UR QL STRIP: NEGATIVE
LYMPHOCYTES # BLD AUTO: 1.2 K/UL (ref 1–4.8)
LYMPHOCYTES NFR BLD: 11.3 % (ref 18–48)
MCH RBC QN AUTO: 30.9 PG (ref 27–31)
MCHC RBC AUTO-ENTMCNC: 30.6 G/DL (ref 32–36)
MCV RBC AUTO: 101 FL (ref 82–98)
MONOCYTES # BLD AUTO: 0.7 K/UL (ref 0.3–1)
MONOCYTES NFR BLD: 6.6 % (ref 4–15)
NEUTROPHILS # BLD AUTO: 8.1 K/UL (ref 1.8–7.7)
NEUTROPHILS NFR BLD: 77.7 % (ref 38–73)
NITRITE UR QL STRIP: NEGATIVE
NONHDLC SERPL-MCNC: 92 MG/DL
NRBC BLD-RTO: 0 /100 WBC
PH UR STRIP: 7 [PH] (ref 5–8)
PLATELET # BLD AUTO: 416 K/UL (ref 150–450)
PMV BLD AUTO: 10.1 FL (ref 9.2–12.9)
POCT GLUCOSE: 121 MG/DL (ref 70–110)
POCT GLUCOSE: 126 MG/DL (ref 70–110)
POTASSIUM SERPL-SCNC: 3.8 MMOL/L (ref 3.5–5.1)
PROT SERPL-MCNC: 7 G/DL (ref 6–8.4)
PROT UR QL STRIP: NEGATIVE
PROTHROMBIN TIME: 12 SEC (ref 9–12.5)
RBC # BLD AUTO: 3.2 M/UL (ref 4–5.4)
SAMPLE: NORMAL
SITE: NORMAL
SODIUM SERPL-SCNC: 141 MMOL/L (ref 136–145)
SP GR UR STRIP: >1.03 (ref 1–1.03)
SPECIMEN OUTDATE: NORMAL
TRIGL SERPL-MCNC: 52 MG/DL (ref 30–150)
TROPONIN I SERPL DL<=0.01 NG/ML-MCNC: <0.006 NG/ML (ref 0–0.03)
TROPONIN I SERPL DL<=0.01 NG/ML-MCNC: <0.006 NG/ML (ref 0–0.03)
TSH SERPL DL<=0.005 MIU/L-ACNC: 1.19 UIU/ML (ref 0.4–4)
URN SPEC COLLECT METH UR: ABNORMAL
WBC # BLD AUTO: 10.43 K/UL (ref 3.9–12.7)

## 2024-08-04 PROCEDURE — 82962 GLUCOSE BLOOD TEST: CPT

## 2024-08-04 PROCEDURE — 86901 BLOOD TYPING SEROLOGIC RH(D): CPT | Performed by: STUDENT IN AN ORGANIZED HEALTH CARE EDUCATION/TRAINING PROGRAM

## 2024-08-04 PROCEDURE — 99900035 HC TECH TIME PER 15 MIN (STAT)

## 2024-08-04 PROCEDURE — G0378 HOSPITAL OBSERVATION PER HR: HCPCS

## 2024-08-04 PROCEDURE — 94761 N-INVAS EAR/PLS OXIMETRY MLT: CPT

## 2024-08-04 PROCEDURE — 85025 COMPLETE CBC W/AUTO DIFF WBC: CPT | Performed by: STUDENT IN AN ORGANIZED HEALTH CARE EDUCATION/TRAINING PROGRAM

## 2024-08-04 PROCEDURE — 25000003 PHARM REV CODE 250: Performed by: STUDENT IN AN ORGANIZED HEALTH CARE EDUCATION/TRAINING PROGRAM

## 2024-08-04 PROCEDURE — 84484 ASSAY OF TROPONIN QUANT: CPT | Mod: 91 | Performed by: STUDENT IN AN ORGANIZED HEALTH CARE EDUCATION/TRAINING PROGRAM

## 2024-08-04 PROCEDURE — 85730 THROMBOPLASTIN TIME PARTIAL: CPT | Performed by: STUDENT IN AN ORGANIZED HEALTH CARE EDUCATION/TRAINING PROGRAM

## 2024-08-04 PROCEDURE — 93010 ELECTROCARDIOGRAM REPORT: CPT | Mod: ,,, | Performed by: INTERNAL MEDICINE

## 2024-08-04 PROCEDURE — 93005 ELECTROCARDIOGRAM TRACING: CPT

## 2024-08-04 PROCEDURE — 85610 PROTHROMBIN TIME: CPT | Performed by: STUDENT IN AN ORGANIZED HEALTH CARE EDUCATION/TRAINING PROGRAM

## 2024-08-04 PROCEDURE — 99214 OFFICE O/P EST MOD 30 MIN: CPT | Mod: FS,,, | Performed by: STUDENT IN AN ORGANIZED HEALTH CARE EDUCATION/TRAINING PROGRAM

## 2024-08-04 PROCEDURE — 99285 EMERGENCY DEPT VISIT HI MDM: CPT | Mod: 25

## 2024-08-04 PROCEDURE — 84443 ASSAY THYROID STIM HORMONE: CPT | Performed by: STUDENT IN AN ORGANIZED HEALTH CARE EDUCATION/TRAINING PROGRAM

## 2024-08-04 PROCEDURE — 81003 URINALYSIS AUTO W/O SCOPE: CPT | Performed by: STUDENT IN AN ORGANIZED HEALTH CARE EDUCATION/TRAINING PROGRAM

## 2024-08-04 PROCEDURE — 80053 COMPREHEN METABOLIC PANEL: CPT | Performed by: STUDENT IN AN ORGANIZED HEALTH CARE EDUCATION/TRAINING PROGRAM

## 2024-08-04 PROCEDURE — 83605 ASSAY OF LACTIC ACID: CPT

## 2024-08-04 PROCEDURE — 86900 BLOOD TYPING SEROLOGIC ABO: CPT | Performed by: STUDENT IN AN ORGANIZED HEALTH CARE EDUCATION/TRAINING PROGRAM

## 2024-08-04 PROCEDURE — 80061 LIPID PANEL: CPT | Performed by: STUDENT IN AN ORGANIZED HEALTH CARE EDUCATION/TRAINING PROGRAM

## 2024-08-04 PROCEDURE — 84484 ASSAY OF TROPONIN QUANT: CPT | Performed by: STUDENT IN AN ORGANIZED HEALTH CARE EDUCATION/TRAINING PROGRAM

## 2024-08-04 PROCEDURE — 96360 HYDRATION IV INFUSION INIT: CPT

## 2024-08-04 PROCEDURE — 25500020 PHARM REV CODE 255: Performed by: STUDENT IN AN ORGANIZED HEALTH CARE EDUCATION/TRAINING PROGRAM

## 2024-08-04 PROCEDURE — 86850 RBC ANTIBODY SCREEN: CPT | Performed by: STUDENT IN AN ORGANIZED HEALTH CARE EDUCATION/TRAINING PROGRAM

## 2024-08-04 PROCEDURE — 63600175 PHARM REV CODE 636 W HCPCS: Performed by: STUDENT IN AN ORGANIZED HEALTH CARE EDUCATION/TRAINING PROGRAM

## 2024-08-04 RX ORDER — CELECOXIB 200 MG/1
200 CAPSULE ORAL DAILY
Status: DISCONTINUED | OUTPATIENT
Start: 2024-08-05 | End: 2024-08-05 | Stop reason: HOSPADM

## 2024-08-04 RX ORDER — GLUCAGON 1 MG
1 KIT INJECTION
Status: DISCONTINUED | OUTPATIENT
Start: 2024-08-04 | End: 2024-08-05 | Stop reason: HOSPADM

## 2024-08-04 RX ORDER — METOPROLOL TARTRATE 25 MG/1
12.5 TABLET ORAL 2 TIMES DAILY
Status: DISCONTINUED | OUTPATIENT
Start: 2024-08-04 | End: 2024-08-04

## 2024-08-04 RX ORDER — ONDANSETRON 8 MG/1
8 TABLET, ORALLY DISINTEGRATING ORAL EVERY 8 HOURS PRN
Status: DISCONTINUED | OUTPATIENT
Start: 2024-08-04 | End: 2024-08-05 | Stop reason: HOSPADM

## 2024-08-04 RX ORDER — SODIUM CHLORIDE 0.9 % (FLUSH) 0.9 %
10 SYRINGE (ML) INJECTION EVERY 12 HOURS PRN
Status: DISCONTINUED | OUTPATIENT
Start: 2024-08-04 | End: 2024-08-05 | Stop reason: HOSPADM

## 2024-08-04 RX ORDER — TALC
6 POWDER (GRAM) TOPICAL NIGHTLY PRN
Status: DISCONTINUED | OUTPATIENT
Start: 2024-08-04 | End: 2024-08-05 | Stop reason: HOSPADM

## 2024-08-04 RX ORDER — IBUPROFEN 200 MG
24 TABLET ORAL
Status: DISCONTINUED | OUTPATIENT
Start: 2024-08-04 | End: 2024-08-05 | Stop reason: HOSPADM

## 2024-08-04 RX ORDER — ALUMINUM HYDROXIDE, MAGNESIUM HYDROXIDE, AND SIMETHICONE 1200; 120; 1200 MG/30ML; MG/30ML; MG/30ML
30 SUSPENSION ORAL 4 TIMES DAILY PRN
Status: DISCONTINUED | OUTPATIENT
Start: 2024-08-04 | End: 2024-08-05 | Stop reason: HOSPADM

## 2024-08-04 RX ORDER — LANOLIN ALCOHOL/MO/W.PET/CERES
1000 CREAM (GRAM) TOPICAL DAILY
Status: DISCONTINUED | OUTPATIENT
Start: 2024-08-05 | End: 2024-08-05 | Stop reason: HOSPADM

## 2024-08-04 RX ORDER — ALLOPURINOL 300 MG/1
300 TABLET ORAL 2 TIMES DAILY
Status: DISCONTINUED | OUTPATIENT
Start: 2024-08-04 | End: 2024-08-05 | Stop reason: HOSPADM

## 2024-08-04 RX ORDER — OXYBUTYNIN CHLORIDE 5 MG/1
5 TABLET, EXTENDED RELEASE ORAL DAILY
Status: DISCONTINUED | OUTPATIENT
Start: 2024-08-05 | End: 2024-08-05 | Stop reason: HOSPADM

## 2024-08-04 RX ORDER — CHOLECALCIFEROL (VITAMIN D3) 25 MCG
1000 TABLET ORAL EVERY MORNING
Status: DISCONTINUED | OUTPATIENT
Start: 2024-08-05 | End: 2024-08-05 | Stop reason: HOSPADM

## 2024-08-04 RX ORDER — PREDNISOLONE ACETATE 10 MG/ML
1 SUSPENSION/ DROPS OPHTHALMIC 3 TIMES DAILY
Status: DISCONTINUED | OUTPATIENT
Start: 2024-08-04 | End: 2024-08-05 | Stop reason: HOSPADM

## 2024-08-04 RX ORDER — SIMETHICONE 80 MG
1 TABLET,CHEWABLE ORAL 4 TIMES DAILY PRN
Status: DISCONTINUED | OUTPATIENT
Start: 2024-08-04 | End: 2024-08-05 | Stop reason: HOSPADM

## 2024-08-04 RX ORDER — FLUOXETINE HYDROCHLORIDE 20 MG/1
40 CAPSULE ORAL DAILY
Status: DISCONTINUED | OUTPATIENT
Start: 2024-08-05 | End: 2024-08-05 | Stop reason: HOSPADM

## 2024-08-04 RX ORDER — FERROUS SULFATE, DRIED 160(50) MG
2 TABLET, EXTENDED RELEASE ORAL DAILY
Status: DISCONTINUED | OUTPATIENT
Start: 2024-08-05 | End: 2024-08-05 | Stop reason: HOSPADM

## 2024-08-04 RX ORDER — IBUPROFEN 200 MG
24 TABLET ORAL
Status: DISCONTINUED | OUTPATIENT
Start: 2024-08-04 | End: 2024-08-04

## 2024-08-04 RX ORDER — NALOXONE HCL 0.4 MG/ML
0.02 VIAL (ML) INJECTION
Status: DISCONTINUED | OUTPATIENT
Start: 2024-08-04 | End: 2024-08-05 | Stop reason: HOSPADM

## 2024-08-04 RX ORDER — INSULIN ASPART 100 [IU]/ML
0-5 INJECTION, SOLUTION INTRAVENOUS; SUBCUTANEOUS
Status: DISCONTINUED | OUTPATIENT
Start: 2024-08-04 | End: 2024-08-05 | Stop reason: HOSPADM

## 2024-08-04 RX ORDER — IBUPROFEN 200 MG
16 TABLET ORAL
Status: DISCONTINUED | OUTPATIENT
Start: 2024-08-04 | End: 2024-08-04

## 2024-08-04 RX ORDER — PANTOPRAZOLE SODIUM 40 MG/1
40 TABLET, DELAYED RELEASE ORAL DAILY
Status: DISCONTINUED | OUTPATIENT
Start: 2024-08-05 | End: 2024-08-05 | Stop reason: HOSPADM

## 2024-08-04 RX ORDER — AMOXICILLIN 250 MG
1 CAPSULE ORAL DAILY
Status: DISCONTINUED | OUTPATIENT
Start: 2024-08-05 | End: 2024-08-05 | Stop reason: HOSPADM

## 2024-08-04 RX ORDER — IBUPROFEN 200 MG
16 TABLET ORAL
Status: DISCONTINUED | OUTPATIENT
Start: 2024-08-04 | End: 2024-08-05 | Stop reason: HOSPADM

## 2024-08-04 RX ORDER — FLUTICASONE PROPIONATE 50 MCG
1 SPRAY, SUSPENSION (ML) NASAL 2 TIMES DAILY PRN
Status: DISCONTINUED | OUTPATIENT
Start: 2024-08-04 | End: 2024-08-05 | Stop reason: HOSPADM

## 2024-08-04 RX ORDER — ALBUTEROL SULFATE 90 UG/1
2 INHALANT RESPIRATORY (INHALATION) EVERY 4 HOURS PRN
Status: DISCONTINUED | OUTPATIENT
Start: 2024-08-04 | End: 2024-08-05 | Stop reason: HOSPADM

## 2024-08-04 RX ORDER — ATORVASTATIN CALCIUM 40 MG/1
80 TABLET, FILM COATED ORAL DAILY
Status: DISCONTINUED | OUTPATIENT
Start: 2024-08-05 | End: 2024-08-05 | Stop reason: HOSPADM

## 2024-08-04 RX ORDER — OXYCODONE AND ACETAMINOPHEN 10; 325 MG/1; MG/1
1 TABLET ORAL EVERY 6 HOURS PRN
Status: DISCONTINUED | OUTPATIENT
Start: 2024-08-04 | End: 2024-08-05 | Stop reason: HOSPADM

## 2024-08-04 RX ORDER — ACETAMINOPHEN 325 MG/1
650 TABLET ORAL EVERY 6 HOURS PRN
Status: DISCONTINUED | OUTPATIENT
Start: 2024-08-04 | End: 2024-08-05 | Stop reason: HOSPADM

## 2024-08-04 RX ORDER — METHOCARBAMOL 750 MG/1
750 TABLET, FILM COATED ORAL 4 TIMES DAILY PRN
Status: DISCONTINUED | OUTPATIENT
Start: 2024-08-04 | End: 2024-08-04

## 2024-08-04 RX ORDER — TIZANIDINE 4 MG/1
4 TABLET ORAL EVERY 8 HOURS PRN
Status: DISCONTINUED | OUTPATIENT
Start: 2024-08-04 | End: 2024-08-05 | Stop reason: HOSPADM

## 2024-08-04 RX ADMIN — IOHEXOL 100 ML: 350 INJECTION, SOLUTION INTRAVENOUS at 01:08

## 2024-08-04 RX ADMIN — APIXABAN 5 MG: 5 TABLET, FILM COATED ORAL at 10:08

## 2024-08-04 RX ADMIN — SODIUM CHLORIDE, POTASSIUM CHLORIDE, SODIUM LACTATE AND CALCIUM CHLORIDE 1000 ML: 600; 310; 30; 20 INJECTION, SOLUTION INTRAVENOUS at 01:08

## 2024-08-04 RX ADMIN — Medication 6 MG: at 10:08

## 2024-08-04 RX ADMIN — ALLOPURINOL 300 MG: 300 TABLET ORAL at 10:08

## 2024-08-05 VITALS
BODY MASS INDEX: 42.49 KG/M2 | DIASTOLIC BLOOD PRESSURE: 84 MMHG | HEART RATE: 89 BPM | WEIGHT: 255 LBS | TEMPERATURE: 99 F | HEIGHT: 65 IN | SYSTOLIC BLOOD PRESSURE: 137 MMHG | OXYGEN SATURATION: 94 % | RESPIRATION RATE: 18 BRPM

## 2024-08-05 LAB
ANION GAP SERPL CALC-SCNC: 9 MMOL/L (ref 8–16)
ASCENDING AORTA: 3.12 CM
AV INDEX (PROSTH): 0.68
AV MEAN GRADIENT: 7 MMHG
AV PEAK GRADIENT: 12 MMHG
AV VALVE AREA BY VELOCITY RATIO: 2.45 CM²
AV VALVE AREA: 2.39 CM²
AV VELOCITY RATIO: 0.69
BSA FOR ECHO PROCEDURE: 2.3 M2
BUN SERPL-MCNC: 15 MG/DL (ref 6–20)
CALCIUM SERPL-MCNC: 9.3 MG/DL (ref 8.7–10.5)
CHLORIDE SERPL-SCNC: 108 MMOL/L (ref 95–110)
CO2 SERPL-SCNC: 24 MMOL/L (ref 23–29)
CREAT SERPL-MCNC: 0.8 MG/DL (ref 0.5–1.4)
CV ECHO LV RWT: 0.33 CM
DOP CALC AO PEAK VEL: 1.76 M/S
DOP CALC AO VTI: 41.02 CM
DOP CALC LVOT AREA: 3.5 CM2
DOP CALC LVOT DIAMETER: 2.12 CM
DOP CALC LVOT PEAK VEL: 1.22 M/S
DOP CALC LVOT STROKE VOLUME: 98.05 CM3
DOP CALCLVOT PEAK VEL VTI: 27.79 CM
E WAVE DECELERATION TIME: 139.14 MSEC
E/A RATIO: 0.9
E/E' RATIO: 5.38 M/S
ECHO LV POSTERIOR WALL: 0.81 CM (ref 0.6–1.1)
ERYTHROCYTE [DISTWIDTH] IN BLOOD BY AUTOMATED COUNT: 15.7 % (ref 11.5–14.5)
EST. GFR  (NO RACE VARIABLE): >60 ML/MIN/1.73 M^2
FRACTIONAL SHORTENING: 31 % (ref 28–44)
GLUCOSE SERPL-MCNC: 106 MG/DL (ref 70–110)
HCT VFR BLD AUTO: 31.5 % (ref 37–48.5)
HGB BLD-MCNC: 9.7 G/DL (ref 12–16)
INTERVENTRICULAR SEPTUM: 0.71 CM (ref 0.6–1.1)
IVRT: 114.18 MSEC
LA MAJOR: 7.35 CM
LA MINOR: 6.88 CM
LA WIDTH: 4.27 CM
LEFT ATRIUM SIZE: 3.45 CM
LEFT ATRIUM VOLUME INDEX MOD: 34 ML/M2
LEFT ATRIUM VOLUME INDEX: 40.6 ML/M2
LEFT ATRIUM VOLUME MOD: 74.42 CM3
LEFT ATRIUM VOLUME: 89 CM3
LEFT INTERNAL DIMENSION IN SYSTOLE: 3.42 CM (ref 2.1–4)
LEFT VENTRICLE DIASTOLIC VOLUME INDEX: 52.28 ML/M2
LEFT VENTRICLE DIASTOLIC VOLUME: 114.5 ML
LEFT VENTRICLE MASS INDEX: 57 G/M2
LEFT VENTRICLE SYSTOLIC VOLUME INDEX: 21.9 ML/M2
LEFT VENTRICLE SYSTOLIC VOLUME: 47.97 ML
LEFT VENTRICULAR INTERNAL DIMENSION IN DIASTOLE: 4.93 CM (ref 3.5–6)
LEFT VENTRICULAR MASS: 124.16 G
LV LATERAL E/E' RATIO: 5 M/S
LV SEPTAL E/E' RATIO: 5.83 M/S
MCH RBC QN AUTO: 30.6 PG (ref 27–31)
MCHC RBC AUTO-ENTMCNC: 30.8 G/DL (ref 32–36)
MCV RBC AUTO: 99 FL (ref 82–98)
MV PEAK A VEL: 0.78 M/S
MV PEAK E VEL: 0.7 M/S
MV STENOSIS PRESSURE HALF TIME: 40.35 MS
MV VALVE AREA P 1/2 METHOD: 5.45 CM2
PISA TR MAX VEL: 2.43 M/S
PLATELET # BLD AUTO: 416 K/UL (ref 150–450)
PMV BLD AUTO: 10.1 FL (ref 9.2–12.9)
POCT GLUCOSE: 115 MG/DL (ref 70–110)
POCT GLUCOSE: 136 MG/DL (ref 70–110)
POTASSIUM SERPL-SCNC: 4.3 MMOL/L (ref 3.5–5.1)
RA MAJOR: 5.58 CM
RA PRESSURE ESTIMATED: 3 MMHG
RA WIDTH: 3.46 CM
RBC # BLD AUTO: 3.17 M/UL (ref 4–5.4)
RIGHT VENTRICLE DIASTOLIC BASEL DIMENSION: 3.3 CM
RV TB RVSP: 5 MMHG
SINUS: 3.29 CM
SODIUM SERPL-SCNC: 141 MMOL/L (ref 136–145)
STJ: 2.99 CM
TDI LATERAL: 0.14 M/S
TDI SEPTAL: 0.12 M/S
TDI: 0.13 M/S
TR MAX PG: 24 MMHG
TRICUSPID ANNULAR PLANE SYSTOLIC EXCURSION: 3.87 CM
TV REST PULMONARY ARTERY PRESSURE: 27 MMHG
WBC # BLD AUTO: 6.32 K/UL (ref 3.9–12.7)
Z-SCORE OF LEFT VENTRICULAR DIMENSION IN END DIASTOLE: -4.06
Z-SCORE OF LEFT VENTRICULAR DIMENSION IN END SYSTOLE: -2.16

## 2024-08-05 PROCEDURE — 80048 BASIC METABOLIC PNL TOTAL CA: CPT | Performed by: STUDENT IN AN ORGANIZED HEALTH CARE EDUCATION/TRAINING PROGRAM

## 2024-08-05 PROCEDURE — 99900035 HC TECH TIME PER 15 MIN (STAT)

## 2024-08-05 PROCEDURE — 85027 COMPLETE CBC AUTOMATED: CPT | Performed by: STUDENT IN AN ORGANIZED HEALTH CARE EDUCATION/TRAINING PROGRAM

## 2024-08-05 PROCEDURE — 36415 COLL VENOUS BLD VENIPUNCTURE: CPT | Performed by: STUDENT IN AN ORGANIZED HEALTH CARE EDUCATION/TRAINING PROGRAM

## 2024-08-05 PROCEDURE — G0378 HOSPITAL OBSERVATION PER HR: HCPCS

## 2024-08-05 PROCEDURE — 25000003 PHARM REV CODE 250: Performed by: STUDENT IN AN ORGANIZED HEALTH CARE EDUCATION/TRAINING PROGRAM

## 2024-08-05 PROCEDURE — 94761 N-INVAS EAR/PLS OXIMETRY MLT: CPT

## 2024-08-05 RX ORDER — METOPROLOL SUCCINATE 25 MG/1
12.5 TABLET, EXTENDED RELEASE ORAL DAILY
Qty: 45 TABLET | Refills: 3 | Status: SHIPPED | OUTPATIENT
Start: 2024-08-06 | End: 2025-08-06

## 2024-08-05 RX ADMIN — Medication 1000 MCG: at 08:08

## 2024-08-05 RX ADMIN — ALLOPURINOL 300 MG: 300 TABLET ORAL at 09:08

## 2024-08-05 RX ADMIN — APIXABAN 5 MG: 5 TABLET, FILM COATED ORAL at 08:08

## 2024-08-05 RX ADMIN — SENNOSIDES AND DOCUSATE SODIUM 1 TABLET: 50; 8.6 TABLET ORAL at 08:08

## 2024-08-05 RX ADMIN — CHOLECALCIFEROL TAB 25 MCG (1000 UNIT) 1000 UNITS: 25 TAB at 08:08

## 2024-08-05 RX ADMIN — METOPROLOL SUCCINATE 12.5 MG: 25 TABLET, EXTENDED RELEASE ORAL at 09:08

## 2024-08-05 RX ADMIN — Medication 2 TABLET: at 08:08

## 2024-08-05 RX ADMIN — CELECOXIB 200 MG: 200 CAPSULE ORAL at 08:08

## 2024-08-05 RX ADMIN — PANTOPRAZOLE SODIUM 40 MG: 40 TABLET, DELAYED RELEASE ORAL at 08:08

## 2024-08-05 RX ADMIN — ATORVASTATIN CALCIUM 80 MG: 40 TABLET, FILM COATED ORAL at 08:08

## 2024-08-05 RX ADMIN — FLUOXETINE HYDROCHLORIDE 40 MG: 20 CAPSULE ORAL at 08:08

## 2024-08-05 RX ADMIN — OXYBUTYNIN CHLORIDE 5 MG: 5 TABLET, EXTENDED RELEASE ORAL at 08:08

## 2024-08-06 ENCOUNTER — TELEPHONE (OUTPATIENT)
Dept: ORTHOPEDICS | Facility: CLINIC | Age: 59
End: 2024-08-06
Payer: COMMERCIAL

## 2024-08-06 DIAGNOSIS — T84.019D PROSTHETIC JOINT IMPLANT FAILURE, SUBSEQUENT ENCOUNTER: ICD-10-CM

## 2024-08-06 DIAGNOSIS — Z96.651 S/P TOTAL KNEE REPLACEMENT, RIGHT: Primary | ICD-10-CM

## 2024-08-06 LAB
OHS QRS DURATION: 72 MS
OHS QTC CALCULATION: 441 MS

## 2024-08-07 ENCOUNTER — TELEPHONE (OUTPATIENT)
Dept: ORTHOPEDICS | Facility: CLINIC | Age: 59
End: 2024-08-07
Payer: COMMERCIAL

## 2024-08-07 ENCOUNTER — PATIENT OUTREACH (OUTPATIENT)
Dept: ADMINISTRATIVE | Facility: CLINIC | Age: 59
End: 2024-08-07
Payer: COMMERCIAL

## 2024-08-12 ENCOUNTER — PATIENT MESSAGE (OUTPATIENT)
Dept: BARIATRICS | Facility: CLINIC | Age: 59
End: 2024-08-12
Payer: COMMERCIAL

## 2024-08-13 ENCOUNTER — CLINICAL SUPPORT (OUTPATIENT)
Dept: REHABILITATION | Facility: HOSPITAL | Age: 59
End: 2024-08-13
Payer: MEDICARE

## 2024-08-13 DIAGNOSIS — R26.2 IMPAIRED AMBULATION: Primary | ICD-10-CM

## 2024-08-13 DIAGNOSIS — T84.019D PROSTHETIC JOINT IMPLANT FAILURE, SUBSEQUENT ENCOUNTER: ICD-10-CM

## 2024-08-13 DIAGNOSIS — Z96.651 S/P TOTAL KNEE REPLACEMENT, RIGHT: ICD-10-CM

## 2024-08-13 PROCEDURE — 97161 PT EVAL LOW COMPLEX 20 MIN: CPT | Mod: PO

## 2024-08-13 NOTE — PROGRESS NOTES
[facility]   Physical Therapy Initial Evaluation      Name: Alivia Marie Cyr  Clinic Number: [id number]    Therapy Diagnosis:   Encounter Diagnoses   Name Primary?    S/P total knee replacement, right     Prosthetic joint implant failure, subsequent encounter     Impaired ambulation Yes        Physician: Seema Weinberg NP    Physician Orders: PT Eval and Treat   Medical Diagnosis from Referral:   Z96.651 (ICD-10-CM) - S/P total knee replacement, right   T84.019D (ICD-10-CM) - Prosthetic joint implant failure, subsequent encounter     Evaluation Date: 8/13/2024  Authorization Period Expiration: 12/31/2024  Plan of Care Expiration: 10/13/2024  Progress Note Due: 9/13/2024  Date of Surgery: 7/18/2024  Visit # / Visits authorized: 1/ 1   FOTO: 1/ 3    Precautions: Standard and Diabetes     Time In: 9:15  Time Out: 9:45  Total Billable Time: 30 minutes    Subjective     Date of onset: pt underwent a R TKA revision on 7/18/2024 this is her third revision on the R knee. She has also had a L TKA.    History of current condition - Alivia reports: She went back in the hospital for low BP and has had Home health for the last few weeks.  Pt reported that she has had low blood pressure and aked to have her BP checked.  Pt with BP of 81/51, 71/51 and 69/49    Falls: none    Imaging: X-Rays:     Prior Therapy: yes  Social History: Pt lives in a single story home with no steps.  lives with their family and lives with their spouse  Occupation: retired  Prior Level of Function: Independent in all ADL's   Current Level of Function: Ambulating with RW,  Equipment; RW BSC and ice machine  Pain:  Current 8/10, worst 10/10, best 7/10   Location: right knee    Description: Aching, Sharp, and pins and needles  Aggravating Factors: sit to stand  Easing Factors: pain medication and ice    Patients goals: to return jame her previous level of function     Medical History:   Past Medical History:   Diagnosis Date     Acute right MCA stroke 01/04/2024    Allergy     Anemia     Asthma     Bilateral shoulder bursitis     Cervical stenosis of spine     Chronic pain     DDD (degenerative disc disease), cervical     Depression 01/28/2019    Diabetes mellitus type II     DJD (degenerative joint disease) of knee 06/19/2014    Facet arthritis of lumbar region 12/17/2015    Facet syndrome 12/17/2015    GERD (gastroesophageal reflux disease)     Heartburn     Hyperlipidemia     Hypertension     Morbid obesity     Neuromuscular disorder     PADDY (obstructive sleep apnea)     Paroxysmal atrial fibrillation 03/19/2024    Proteinuria     Right carpal tunnel syndrome     Sacroiliitis 06/13/2018    Sacroiliitis     Sleep apnea     Uterine fibroid        Surgical History:   Alivia Thomas  has a past surgical history that includes Carpal tunnel release; Carpal tunnel release (1980s); Carpal tunnel release (2012); Knee surgery (03/2010); Knee surgery (06/19/2014); Radiofrequency ablation of lumbar medial branch nerve at single level (Left, 06/26/2018); Radiofrequency ablation of lumbar medial branch nerve at single level (Right, 07/10/2018); Esophagogastroduodenoscopy (N/A, 03/27/2019); Radiofrequency ablation (Right, 07/09/2019); Joint replacement (Bilateral); Trigger finger release (Right, 2017); Trigger finger release (Left, 07/29/2019); Laparoscopic sleeve gastrectomy (N/A, 08/28/2019); Radiofrequency ablation (Left, 12/19/2019); Radiofrequency ablation (Right, 12/31/2019); Injection of joint (Bilateral, 06/09/2020); Colonoscopy (N/A, 06/15/2020); Radiofrequency ablation (Right, 10/13/2020); Radiofrequency ablation (Left, 11/03/2020); Radiofrequency ablation (Left, 12/15/2020); Radiofrequency ablation (Right, 12/29/2020); Injection of joint (Bilateral, 05/11/2021); Radiofrequency ablation (Left, 06/29/2021); Radiofrequency ablation (Right, 07/13/2021); Radiofrequency ablation (Right, 08/24/2021); Radiofrequency ablation (Left, 09/11/2021);  Radiofrequency ablation (Right, 03/07/2022); Radiofrequency ablation (Left, 03/21/2022); Radiofrequency ablation (Right, 05/09/2022); Radiofrequency ablation (Left, 05/23/2022); Panniculectomy (N/A, 06/14/2022); Radiofrequency ablation (Right, 12/12/2022); Radiofrequency ablation (Left, 01/09/2023); Radiofrequency ablation (Right, 03/06/2023); Radiofrequency ablation (Left, 05/22/2023); Epidural steroid injection (N/A, 07/24/2023); Cataract extraction w/  intraocular lens implant (Left, 08/08/2023); Cataract extraction w/  intraocular lens implant (Right, 08/29/2023); Radiofrequency ablation (Right, 11/27/2023); Radiofrequency ablation (Right, 01/22/2024); Radiofrequency ablation (Left, 02/12/2024); Injection of joint (Bilateral, 5/16/2024); and Revision of knee arthroplasty (Right, 7/18/2024).    Medications:   Alivia has a current medication list which includes the following prescription(s): acetaminophen, albuterol, allopurinol, apixaban, atorvastatin, b complex vitamins, blood pressure monitor, calcium citrate/vitamin d2, celecoxib, colchicine, cyanocobalamin, diclofenac sodium, fluoxetine, fluticasone propionate, lidocaine, metoprolol succinate, midodrine, multivit-iron-min-folic acid, nystatin, oxybutynin, oxycodone-acetaminophen, pantoprazole, prednisolone acetate, ozempic, senna-docusate 8.6-50 mg, tizanidine, urea, and vitamin d, and the following Facility-Administered Medications: 0.9% nacl, 0.9% nacl, and 0.9% nacl.    Allergies:   Review of patient's allergies indicates:   Allergen Reactions    Strawberry Anaphylaxis        Objective      Observation: Pt entered the clinic ambulating with a RW    Posture: flexed R knee      Range of Motion:   Knee Left active Left Passive Right Active R passive   Flexion nt nt 90 nt   Extension nt nt 30 nt           Lower Extremity Strength  Right LE  Left LE    Knee extension: nt Knee extension: 3/5   Knee flexion: nt Knee flexion: nt   Hip flexion: nt Hip flexion: nt    Hip extension:  nt Hip extension: nt   Hip abduction: nt Hip abduction: nt   Hip adductoin nt Hip adduction nt   Ankle dorsiflexion: nt Ankle dorsiflexion: nt   Ankle plantarflexion: nt Ankle plantarflexion nt         Step down test: nt      Function:    - SLS R: nt  - SLS L: nt  - Squat: nt   - Sit <--> Stand:independent   - Bed Mobility: independent        Joint Mobility: hypomobile L knee  Patellar    Palpation: nt    Sensation: intact    Flexibility:    Ely's test: R = nt degrees ; L = nt degrees   Popliteal Angle: R = nt degrees ; L = nt degrees   Pao's test: R = nt ; L = nt   Maximus test: R = nt ; L = nt    Edema: nt    Girth Measurement Joint line 5 cm below 10 cm above   Left nt cm nt cm nt cm   Right nt cm nt cm nt cm       Intake Outcome Measure for FOTO Knee Survey    Therapist reviewed FOTO scores for Alivia Thomas on 8/13/2024.   FOTO report - see Media section or FOTO account episode details.    Intake Score: 14%         Treatment     Pt did not receive treatment today secondary to low blood pressure.  .    Patient Education and Home Exercises     Education provided:   - Pt to continue working on heel props to improve L knee extension.    Written Home Exercises Provided:  Pt was not instructed in a HEP other than to continue with heel props to assist in improving L knee extension .   Assessment     Alivia is a 58 y.o. female referred to outpatient Physical Therapy with a medical diagnosis of   Z96.651 (ICD-10-CM) - S/P total knee replacement, right   T84.019D (ICD-10-CM) - Prosthetic joint implant failure, subsequent encounter   . Patient presents with limited L knee ROM ambulating with a RW 2 weeks s/p R TKA revision.    Patient prognosis is Good.   Patient will benefit from skilled outpatient Physical Therapy to address the deficits stated above and in the chart below, provide patient /family education, and to maximize patientt's level of independence.     Plan of care discussed with patient:  No  Patient's spiritual, cultural and educational needs considered and patient is agreeable to the plan of care and goals as stated below:     Anticipated Barriers for therapy: her blood pressure     Medical Necessity is demonstrated by the following  History  Co-morbidities and personal factors that may impact the plan of care [] LOW: no personal factors / co-morbidities  [x] MODERATE: 1-2 personal factors / co-morbidities  [] HIGH: 3+ personal factors / co-morbidities    Moderate / High Support Documentation:   Co-morbidities affecting plan of care: 4 previous L knee surgeries    Personal Factors:   coping style     Examination  Body Structures and Functions, activity limitations and participation restrictions that may impact the plan of care [] LOW: addressing 1-2 elements  [x] MODERATE: 3+ elements  [] HIGH: 4+ elements (please support below)    Moderate / High Support Documentation: ROM, strength and      Clinical Presentation [x] LOW: stable  [] MODERATE: Evolving  [] HIGH: Unstable     Decision Making/ Complexity Score: low       Goals:  Short Term Goals: 4 weeks   Pt will be instructed in an exercise program to address functional deficits related to her L LE Sx  Improve L knee ROM to 0 degrees of extension  Improve L knee flexion to 100 degrees  Pt will report decreased L knee pain to </= 6/10 at worst    Long Term Goals: 16 weeks   Pt will be independent in a HEP to assist in managing their L LE Sx.   Improve L knee flexion to >/= 110 degrees  Pt will be an independent community ambulator with the least restrictive assistive device.  Pt will report improved functional ability through an improved score on the FOTO knee survey.  Plan     Plan of care Certification: 8/13/2024 to 10/13/2024.    Outpatient Physical Therapy 2 times weekly for 10 weeks to include the following interventions: Electrical Stimulation as indicated, Gait Training, Manual Therapy, Moist Heat/ Ice, Neuromuscular Re-ed, Patient Education,  Self Care, Therapeutic Activities, Therapeutic Exercise, and Dry needling .     Beltran Estes, PT        Physician's Signature: _________________________________________ Date: ________________

## 2024-08-15 ENCOUNTER — CLINICAL SUPPORT (OUTPATIENT)
Dept: REHABILITATION | Facility: HOSPITAL | Age: 59
End: 2024-08-15
Payer: MEDICARE

## 2024-08-15 DIAGNOSIS — R26.2 IMPAIRED AMBULATION: Primary | ICD-10-CM

## 2024-08-15 PROCEDURE — 97140 MANUAL THERAPY 1/> REGIONS: CPT | Mod: PO

## 2024-08-15 PROCEDURE — 97110 THERAPEUTIC EXERCISES: CPT | Mod: PO

## 2024-08-15 PROCEDURE — 97112 NEUROMUSCULAR REEDUCATION: CPT | Mod: PO

## 2024-08-15 NOTE — PROGRESS NOTES
[facility]   Physical Therapy Treatment Note      Name: Alivia Thomas  Clinic Number: [id number]    Therapy Diagnosis:   Encounter Diagnosis   Name Primary?    Impaired ambulation Yes     Physician: Seema Weinberg NP    Visit Date: 8/15/2024    Evaluation Date: 8/13/2024  Authorization Period Expiration: 12/31/2024  Plan of Care Expiration: 10/13/2024  Progress Note Due: 9/13/2024  Date of Surgery: 7/18/2024  Visit # / Visits authorized: 1/29   FOTO: 1/ 3    Precautions: Standard and Diabetes     Time In: 7:05  Time Out: 7:58  Total Billable Time: 30 minutes      PTA Visit #: 0/5   Subjective     Patient reports: Pt reports aamir her BP was fine at 5:30 this morning.  She  was not instructed in a HEP   compliant with home exercise program.  Response to previous treatment: Pt was hypotensive on initial evaluation.  Functional change: ongoing    Pain: 8/10  Location: right knee      Objective    /90  Objective Measures updated at progress report unless specified.     Observation: Pt entered the clinic ambulating with a RW    Posture: flexed R knee      Range of Motion:   Knee Left active Left Passive Right Active R passive   Flexion 130 nt 115 nt   Extension 0 nt 10 nt           Lower Extremity Strength  Right LE  Left LE    Knee extension: 3+/5* Knee extension: 5/5   Knee flexion: 2-/5 Knee flexion: 5/5   Hip flexion: 2/5 Hip flexion: 4/5   Hip extension:  nt Hip extension: nt   Hip abduction: nt Hip abduction: nt   Hip adductoin nt Hip adduction nt   Ankle dorsiflexion: 5/5 Ankle dorsiflexion: 5/5   Ankle plantarflexion: nt Ankle plantarflexion nt         Step down test: nt      Function:    - SLS R: nt  - SLS L: nt  - Squat: nt   - Sit <--> Stand:independent   - Bed Mobility: independent        Joint Mobility: hypomobile L knee  Patellar    Palpation: nt    Sensation: intact    Flexibility:    Ely's test: R = nt degrees ; L = nt degrees   Popliteal Angle: R = nt degrees ; L =  nt degrees   Pao's test: R = nt ; L = nt   Maximus test: R = nt ; L = nt    Edema: nt    Girth Measurement Joint line 5 cm below 10 cm above   Left 52.5 cm nt cm nt cm   Right 52 cm nt cm nt cm     Treatment     Alivia received the treatments listed below:      therapeutic exercises to develop strength, endurance, ROM, flexibility, posture, and core stabilization for 15 minutes including:  Heel slides 20 x 2  Heel prop instructed for HEP  Ankle pumps 10 x 2 - for HEP    manual therapy techniques: Joint mobilizations and Soft tissue Mobilization were applied to the: R knee for 15 minutes, including:  R patella mobilization   R distal quadriceps soft tissue mobilization  Patella tendon soft tissue mobilization    neuromuscular re-education activities to improve: Balance, Coordination, Kinesthetic, Sense, Proprioception, and Posture for 23 minutes. The following activities were included:  Quad sets x 10 and x 10 minutes with Russian ES to R quad 10 sec on/off with a towel roll under R knee  Hook lying B hip adduction 10 x 3 with a ball  Hook lying B hip abduction 10 x 3 with Green TB    therapeutic activities to improve functional performance for 00  minutes, including:      gait training to improve functional mobility and safety for 0  minutes, including:      Patient Education and Home Exercises       Education provided:   - written HEP    Written Home Exercises Provided: Yes. Exercises were reviewed and Alivia was able to demonstrate them prior to the end of the session.  Alivia demonstrated good  understanding of the education provided. See Electronic Medical Record under Patient Instructions for exercises provided during therapy sessions    Assessment     Alivia presents with BP within an acceptable range this morning.  The evaluation was completed and Pt was instructed in a HEP. She was able to tolerate all Tx activities except heel prop that caused an increase in her pain level.      Alivia Is progressing well  towards her goals.   Patient prognosis is Good.     Patient will continue to benefit from skilled outpatient physical therapy to address the deficits listed in the problem list box on initial evaluation, provide pt/family education and to maximize pt's level of independence in the home and community environment.     Patient's spiritual, cultural and educational needs considered and pt agreeable to plan of care and goals.     Anticipated barriers to physical therapy: compliance    Goals:   Short Term Goals: 4 weeks   Pt will be instructed in an exercise program to address functional deficits related to her L LE Sx  Improve L knee ROM to 0 degrees of extension  Improve L knee flexion to 100 degrees  Pt will report decreased L knee pain to </= 6/10 at worst     Long Term Goals: 16 weeks   Pt will be independent in a HEP to assist in managing their L LE Sx.   Improve L knee flexion to >/= 110 degrees  Pt will be an independent community ambulator with the least restrictive assistive device.  Pt will report improved functional ability through an improved score on the FOTO knee survey.  Plan     Progress Physical Therapy program to assist Pt with managing her R LE Sx.    Beltran Estes, PT

## 2024-08-16 ENCOUNTER — OFFICE VISIT (OUTPATIENT)
Dept: PAIN MEDICINE | Facility: CLINIC | Age: 59
End: 2024-08-16
Payer: MEDICARE

## 2024-08-16 VITALS
TEMPERATURE: 99 F | BODY MASS INDEX: 42.45 KG/M2 | SYSTOLIC BLOOD PRESSURE: 136 MMHG | DIASTOLIC BLOOD PRESSURE: 85 MMHG | OXYGEN SATURATION: 99 % | WEIGHT: 255.06 LBS | HEART RATE: 58 BPM | RESPIRATION RATE: 18 BRPM

## 2024-08-16 DIAGNOSIS — M70.61 GREATER TROCHANTERIC BURSITIS OF BOTH HIPS: ICD-10-CM

## 2024-08-16 DIAGNOSIS — M47.816 LUMBAR SPONDYLOSIS: ICD-10-CM

## 2024-08-16 DIAGNOSIS — M47.816 SPONDYLOSIS OF LUMBAR REGION WITHOUT MYELOPATHY OR RADICULOPATHY: ICD-10-CM

## 2024-08-16 DIAGNOSIS — Z96.651 S/P REVISION OF TOTAL KNEE, RIGHT: ICD-10-CM

## 2024-08-16 DIAGNOSIS — M51.36 DDD (DEGENERATIVE DISC DISEASE), LUMBAR: ICD-10-CM

## 2024-08-16 DIAGNOSIS — G89.4 CHRONIC PAIN SYNDROME: Primary | ICD-10-CM

## 2024-08-16 DIAGNOSIS — M46.1 SACROILIITIS: ICD-10-CM

## 2024-08-16 DIAGNOSIS — M70.62 GREATER TROCHANTERIC BURSITIS OF BOTH HIPS: ICD-10-CM

## 2024-08-16 DIAGNOSIS — M47.816 ARTHRITIS OF FACET JOINT OF LUMBAR SPINE: ICD-10-CM

## 2024-08-16 PROCEDURE — 99999 PR PBB SHADOW E&M-EST. PATIENT-LVL V: CPT | Mod: PBBFAC,,,

## 2024-08-16 PROCEDURE — 99214 OFFICE O/P EST MOD 30 MIN: CPT | Mod: S$PBB,,,

## 2024-08-16 PROCEDURE — 99215 OFFICE O/P EST HI 40 MIN: CPT | Mod: PBBFAC

## 2024-08-16 RX ORDER — DEXTROMETHORPHAN HYDROBROMIDE, GUAIFENESIN 5; 100 MG/5ML; MG/5ML
650 LIQUID ORAL EVERY 8 HOURS
Qty: 90 TABLET | Refills: 0 | Status: SHIPPED | OUTPATIENT
Start: 2024-08-16

## 2024-08-16 NOTE — PROGRESS NOTES
Chronic patient Established Note (Follow up visit)           SUBJECTIVE:    Interval History 8/16/2024:  Alivia Thomas returns to clinic for follow-up of chronic lower back pain. She is s/p right TKA revision on 7/18/2024. She continues Percocet with mild relief. She has been having increased pain since her TKA and with associated physical therapy. She is wondering if her dose could be temporarily increased. She also takes Tylenol arthritis with good relief. She denies any perceived side effects. She denies recent falls or trauma. She denies new onset fever/night sweats, urinary incontinence, bowel incontinence, significant weight changes, significant motor weakness or changes, or loss of sensations. Her pain today is 8/10.     Interval History 5/29/2024:  Alivia Thomas returns to clinic s/p Bilateral SIJ and GTB injections on 5/16/2024.  She reports 70% relief. She is able to complete ADLs with limited pain and her quality of life is improved. She takes Percocet 10/325 TID PRN with good relief. She denies any perceived side effects. She is going to restart water aerobics at her local pool.  She is having a right TKA revision in July.  After healing from that surgery is complete, she would like to start aquatic therapy for her back. The patient denies fever/night sweats, urinary incontinence, bowel incontinence, significant weight changes, significant motor weakness or changes, or loss of sensations. Her pain today is 4/10.    Interval History 4/29/2024:  Alivia Thomas returns today for follow-up of back pain and right knee pain.  She denies any radicular pain.  She notices bilateral lower back pain with pain into the buttocks and lateral thighs. She takes Percocet TID PRN with good relief.  She denies any perceived side effects. Her pharmacy has been out of the medication and she has been out since 3/10/2024.  The patient denies fever/night sweats, urinary incontinence, bowel incontinence, significant  weight changes, significant motor weakness or changes, or loss of sensations. Her pain today is 9/10.    Interval History 1/9/2024:  The patient returns to clinic today for follow up of back pain via virtual visit. She is s/p right L3,4,5 RFA on 11/27/2023. She reports 40-50% relief at this time. She did not have left sided RFA due to illness. Of note, she had a stroke last week. She began having a headache, left sided facial drooping, and slurring of her speech. She did go to the ER where she received TPA. She reports mild weakness into her left arm. She continues to report low back pain. She denies any radicular leg pain. Her pain is worse with activity. She also reports bilateral knee pain, worse with standing and walking. She is scheduled for genicular RFA in the next few weeks. She will reschedule left lumbar RFA. She is taking Percocet as needed with benefit. She denies any adverse effects. She denies any other health changes.      Interval History 10/24/23:  Alivia Thomas returns today for follow-up. Since last seen, they report their pain has been worsening. She last received a toradol shot instead of an epidural as she did not wish to have an epidural.  Today, she is here for follow-up with me to evaluate.  She is requesting to increase her oxycodone to 4 times a day instead of 3 times a day.  I had a long discussion with her and advised her we either change the medication but not increase it, try repeating radiofrequency ablation since it worked well for her and/or spinal cord stimulator.  Yesterday, surgery was another alternative for managing her pain.  She had a CT scan that we went over the results.  She has multilevel degenerative disease with severe facet degenerative disease and multilevel spinal and foraminal stenosis.  Yesterday, she declined the surgery.  Today she is open to repeating the radiofrequency ablation but she does not want to change the medication.  Previously, the radiofrequency  ablation lasted several months up to a year.  The last time she said it lasted about 3-4 month than the pain started coming back gradually over the following 3-4 months.  Overall, she still had good relief.  She is complaining of the right knee as well.  Much less pain on the left knee.  The radicular component of her back pain is in the dorsal thighs bilaterally worse on the right negligible on the left.  No new bowel or bladder symptomatology.  No fever, chills or night sweats.    Interval History 10/23/23: Alivia Thomas returns for follow up. This is a patient of Dr. Hurtado. Her visit with [place] was cancelled last week so she was placed on my schedule today. At her last visit she was having pain radiating down her legs, but did not want to have an epidural. They performed a Toradol shot (short term relief) and referred her to Neurosurgery. She saw Dr. Bartlett today who said either she will need SCS or decompression. On review of her CT and MRI, she has severe facet arthropathy and severe canal stenosis at L3/4 and L4/5. She notes being able to walk <1 block, after which she must sit down. She reports 8-10/10 pain and this is interfering with her life significantly.     Interval History 9/18/2023:  The patient presents today to request an injection for increased back pain. She has been having worsened pain over the past couple of weeks. She does not wish to have another KERI at this time. She is having lower back pain with radiation down the back of both legs, R>L. She has associated numbness and tingling as well as subjective weakness. No bowel or bladder incontinence. She has not seen neurosurgery. Her pain today is 10/10.    Interval History 8/14/2023:  The patient is here for follow up after procedure for back pain. She is now s/p L5/S1 IL KERI with about 50% relief. She still has lower back pain with radiation down the back of the legs, stopping at the knees. She has associated numbness to lower extremities.  Her pain worsens with walking, bending and reaching upward. She has been working on home exercises and stretches without much benefit. She has some benefit with medication as needed.  She report Her pain today is 9/10.    Interval History 7/10/2023:  The patient is here today for evaluation of increased lower back pain. She has a long-standing history of back pain which is mainly axial. She has had worsened symptoms over the past couple of weeks, most severe over the past 3 days. The pain now radiates down the back of both legs with burning and numbness. She finds it difficult to stand or walk for any length of time. No bowel or bladder incontinence. No fever or malaise. Medications are helping somewhat. She continues with home PT exercises as previously taught in PT. Her pain today is 10/10.    Interval History 4/10/2023:  The patient returns to clinic today for follow up of low back and knee pain via virtual visit. She is s/p right L3,4,5 RFA on 3/6/2023. Her left side procedure was rescheduled due to illness. This is scheduled for May 1st. She reports some relief of her right sided low back pain. She continues to report left sided low back pain. She denies any radicular leg pain. She does endorse morning stiffness. She continues to perform a home exercise routine. She is taking Percocet as needed for severe pain. She denies any other health changes.     Interval History 1/30/2023:  The patient returns to clinic today for follow up of low back and knee pain. She is s/p right genicular RFA on 12/12/2022 and left genicular RFA on 1/9/2023. She reports 60% relief of her knee pain. She reports intermittent bilateral knee pain, this is tolerable at this time. She reports increased low back pain. Her pain is constant and aching in nature. Her leg pain is much improved. Her pain is worse with moving from sitting to standing. She does endorse morning stiffness. She continues to participate in physical therapy and a home  exercise routine. She continues to take Percocet as needed with benefit and without adverse effects. She denies any other health changes.     Interval History 11/22/2022:  The patient returns to clinic today for follow up of low back and knee pain. She continues to report low back pain that radiates into the posterior aspect of both legs to under her feet. Her pain is worse with moving from sitting to standing. She also endorses morning stiffness. She recently started physical therapy. She has completed one session. She continues to report bilateral knee pain. She endorses worsening pain with the cold weather. Her pain is worse with standing and walking. She continues to take Percocet as needed with benefit and without adverse effects. She denies any other health changes. Her pain today is 8/10.    Interval History 10/5/2022:  She returns for follow-up.  Her knees are doing well.  She has some discomfort but not enough to do procedures.  Her main complaint is lumbar spine pain for the past few weeks that is radiating to the dorsal aspect of both lower extremities.  She has no red flag signs/symptoms.  No new bowel or bladder symptomatology.  The pain radiates from the back down to the plantar aspect of both feet.  It is activity related.  Rest helps some but it does not resolve the pain.  She has not been in therapy for a while.  No recent imaging.  No recent weight changes.  Otherwise, no new symptomatology.  She goes regularly to her primary care physician for health maintenance.    Interval History 8/9/2022:  Alivia Thomas presents to the clinic for a follow-up appointment for low back and knee pain. She is s/p right genicular RFA on 5/9/2022 and left genicular RFA on 5/22/2022. She reports 60% relief of her knee pain. She also had panniculectomy on 6/14/2022. She is healing well. She continues to report intermittent low back pain, worse with prolonged activity. She denies any radicular leg pain. Her pain is  worse prolonged standing and walking. She continues to perform a homoe exercise routine. She continues to take Percocet as needed with benefit and without adverse effects. She denies any other health changes. Her pain today is 7/10.    Interval History 4/9/2022:  The patient returns to clinic today for follow-up bilateral L3, 4, 5 RFA last month.  She reports that she is feeling very well following the procedure and reports 60-70% pain relief.  Today, she reports that her bilateral knee pain has continued to worsen, and she would like to go ahead and repeat bilateral genicular RFA as soon as possible.  She denies any new numbness, tingling, weakness, saddle anesthesia or bowel/bladder incontinence.    Interval History 12/28/2021:  The patient returns to clinic today for follow up via virtual visit. She continues to report bilateral knee pain. This pain is worse with prolonged standing and walking. She continues to report low back and buttock pain. She denies any radicular leg pain. She continues to report a home exercise routine. She continues to report benefit with Percocet. She denies any adverse effects. She denies any other health changes.    Pain Disability Index Review:      5/29/2024     3:08 PM 10/23/2023     3:37 PM 9/18/2023     1:25 PM   Last 3 PDI Scores   Pain Disability Index (PDI) 55 27 24       Pain Medications:  Percocet 10/325 mg TID PRN pain    Opioid Contract: yes     report:  Reviewed and consistent with medication use as prescribed.    Pain Procedures:   steroid inj and visco-supplementation (series of 3) in left knee- not helpful  3/31/14 Bilateral genicular nerve blocks  2/16/16 Bilateral L2,3,4,5 MBB  3/1/16 Bilateral L2,3,4,5 MBB   4/6/16 Left L2,3,4,5 RFA- significant relief  4/20/16 Right L2,3,4,5 RFA- significant relief  10/5/16 Left L2,3,4,5 cooled RFA  10/19/16 Right L2,3,4,5 cooled RFA  4/26/17 Left L2,3,4,5 cooled RFA  5/9/17 Right L2,3,4,5 cooled RFA  12/26/2017- Left L2,3,4,5  cooled RFA  1/9/2018- Right L2,3,4,5 cooled RFA  6/26/18 Left L2,3,4,5 cooled RFA- 60% relief  7/10/18 Right L2,3,4,5 cooled RFA- 60% relief  9/28/18 TPIs- significant relief  12/27/18 Left L2,3,4,5 RFA- 70% relief  1/29/19 Right L2,3,4,5 RFA- 70% relief  6/18/19 Left L2,3,4,5 RFA- 70% relief  7/9/19 Right L2,3,4,5 RFA- 50% relief  12/19/19 Left L2,3,4,5 RFA- 80% relief  12/31/19 Right L2,3,4,5 RFA- 50% relief  3/2/2020- Right genicular nerve block- 70% relief for one month  3/12/20 Left subacromial bursa injection- 90% relief  5/18/2020- Left genicular nerve block- 70%  6/9/2020- Bilateral SI joint injections- 50% relief  10/13/2020- Right genicular RFA- 50% relief   11/3/2020- Left genicular RFA- 50% relief  12/15/2020- Left L2,3,4,5 RFA  12/29/2020- Right L2,3,4,5 RFA  4/26/2021- Left subacromial bursa'  5/11/2021- Bilateral SI joint injections   6/28/2021- Left genicular RFA  7/13/2021- Right genicular RFA  8/24/2021- Right L2,3,4,5 RFA  9/11/2021- Left L2,3,4,5 RFA  3/7/2022- Right L2,3,4,5 RFA  3/21/2022- Left L2,3,4,5 RFA  5/9/2022- Right genicular RFA  5/23/2022- Left genicular RFA  12/12/2022- Right genicular RFA  1/9/2023- Left genicular RFA  3/6/2023- Right L3,4,5 RFA  7/24/23 L5/S1 IL KERI- 50% relief  11/27/2023- Right L3,4,5 RFA  1/22/2024 - Right Genicular - 60% relief  2/12/2024 - Left L3, L4, L5 RFA - 60% relief   5/16/2024 - Bilateral SIJ and GTB - 70% relief    Physical Therapy/Home Exercise: yes    Imaging:   MRI Lumbar Spine 7/12/2023:  COMPARISON:  10/6/22.     FINDINGS:  Alignment: Grade 1 anterolisthesis at L3-L4 and L4-L5.  No evidence for spondylolysis.     Vertebrae: Degenerative endplate changes throughout the lower lumbar spine.  Hemangioma noted within the L5 vertebral body.  No fracture or marrow infiltrative process.     Discs: Moderate disc height loss at L3-S1.  No evidence for discitis.     Cord: Conus terminates at T12-L1 and appears unremarkable.  Cauda equina appears  unremarkable.     Degenerative findings:     T12-L1: No spinal canal stenosis or neuroforaminal narrowing.     L1-L2: Mild facet arthropathy results in mild right neural foraminal narrowing.     L2-L3: Circumferential disc bulge and mild facet arthropathy result in mild effacement of the thecal sac and mild bilateral neural foraminal narrowing.     L3-L4: Uncovering of the intervertebral disc with bulge and severe facet arthropathy result in severe spinal canal stenosis and severe left, moderate right neural foraminal narrowing.     L4-L5: Uncovering of the intervertebral disc with bulge and severe facet arthropathy result in severe spinal canal stenosis and severe left, moderate right neural foraminal narrowing.     L5-S1: Circumferential disc bulge and mild facet arthropathy.  No spinal canal stenosis or neural foraminal narrowing.     Paraspinal muscles & soft tissues: Mild paraspinal muscle atrophy.  Partially visualized markedly enlarged leiomyomatous uterus noted.  There is asymmetric enlargement of the right kidney.     Impression:     1. Multilevel advanced degenerative change of the lumbar spine.  Severe spinal canal stenosis and moderate to severe neural foraminal narrowing noted at L3-L5.  2. Partially visualized markedly enlarged leiomyomatous uterus.  Recommend further evaluation pelvic ultrasound.  3. Asymmetric enlargement of the right kidney.  Recommend further evaluation with retroperitoneal ultrasound.  This report was flagged in Epic as abnormal.    Xray Knee 3/6/2019:  FINDINGS:  Postop bilateral knee replacements.  The the the the irregularity about the left patella with soft tissue calcifications similar.  Small joint effusion.  Some irregularity of the right patella unchanged as well.     IMPRESSION:      Postop bilateral knee replacement with irregularity about the patella as above.    MRI Cervical Spine 4/24/2018:  FINDINGS:  There is reversal of the normal cervical lordosis which could be  related to patient positioning versus muscle spasm.     Cervical vertebral body heights are well maintained without evidence of acute fracture or dislocation.  Marrow signal is unremarkable.     Spinal canal contents are unremarkable.  No abnormal masses or collections.     Individual levels as detailed below:     C2-3: No significant spinal canal stenosis or neuroforaminal narrowing.     C3-4: Facet arthropathy.  No significant spinal canal stenosis or neuroforaminal narrowing.     C4-5: Facet arthropathy.  Small broad-based disc osteophyte complex.  Mild right neuroforaminal narrowing.  Mild spinal canal stenosis.     C5-6: Facet arthropathy.  Uncovertebral joint spurring.  Broad-based posterior disc osteophyte complex.  Mild right neuroforaminal narrowing.  Mild spinal canal stenosis.     C6-7: Facet arthropathy.  No significant spinal canal stenosis or neuroforaminal narrowing.     C7-T1: No significant spinal canal stenosis or neuroforaminal narrowing.     Paraspinal muscles are unremarkable.  Soft tissues are intact.     IMPRESSION:      Mild multilevel cervical spondylosis with mild spinal canal stenosis and mild right neuroforaminal narrowing at C4-5 and C5-6.    Xray Lumbar Spine 9/21/2015:  Results: AP, lateral neutral, lateral flexion , lateral extension, bilateral oblique and spot views.  The alignment of the lumbar spine demonstrates a mild levoscoliosis .  11-mm anterior listhesis of L4 relative to L5 with no translational abnormalities   seen on flexion and extension views.  The vertebral body heights  are well-maintained  , mild disk space narrowing L4-L5 and L5-S1.   Mild anterior and marginal osteophyte formation seen  throughout the lumbar spine  .  The oblique views demonstrate no   definite spondylolysis.  There is facet joint osseous hypertrophy noted at L3-L4 and L4-L5.  IMPRESSION:         The Significant spondylosis of the lumbar spine with grade 1 anterior listhesis L4 relative to  L5.  Facet joint osseous hypertrophy L3-L4 and L4-5.    Allergies:   Review of patient's allergies indicates:   Allergen Reactions    Strawberry Anaphylaxis       Current Medications:   Current Outpatient Medications   Medication Sig Dispense Refill    acetaminophen (TYLENOL) 650 MG TbSR Take 1 tablet (650 mg total) by mouth every 8 (eight) hours. 120 tablet 0    albuterol (VENTOLIN HFA) 90 mcg/actuation inhaler INHALE TWO PUFFS INTO LUNGS EVERY 4 TO 6 HOURS AS NEEDED FOR SHORTNESS OF BREATH AND FOR WHEEZING 18 g 1    apixaban (ELIQUIS) 5 mg Tab Take 1 tablet (5 mg total) by mouth 2 (two) times daily. 60 tablet 6    atorvastatin (LIPITOR) 80 MG tablet Take 1 tablet (80 mg total) by mouth once daily. 90 tablet 3    b complex vitamins tablet Take 1 tablet by mouth every other day.       blood pressure monitor (BLOOD PRESSURE KIT) Kit 1 kit by Misc.(Non-Drug; Combo Route) route Daily. Check blood pressure daily 1 each 0    calcium citrate/vitamin D2 (ISIAH-CITRATE ORAL) Take 500 mg by mouth once daily.      celecoxib (CELEBREX) 200 MG capsule Take 1 capsule (200 mg total) by mouth once daily. 30 capsule 0    colchicine (MITIGARE) 0.6 mg Cap One daily as needed for gout flare (Patient taking differently: One daily) 90 capsule 1    cyanocobalamin (VITAMIN B-12) 1000 MCG tablet Take 100 mcg by mouth every morning.      diclofenac sodium (VOLTAREN) 1 % Gel Apply 2 g topically 4 (four) times daily as needed. To painful area on the feet. 500 g 5    fluticasone propionate (FLONASE) 50 mcg/actuation nasal spray 1 SPRAY BY EACH NOSTRIL ROUTE 2 TIMES DAILY AS NEEDED FOR RHINITIS. 48 g 3    LIDOcaine (XYLOCAINE) 5 % Oint ointment APPLY TOPICALLY AS NEEDED. 35.44 g 6    metoprolol succinate (TOPROL-XL) 25 MG 24 hr tablet Take 0.5 tablets (12.5 mg total) by mouth once daily. 45 tablet 3    midodrine (PROAMATINE) 5 MG Tab Take 1 tablet twice a day by oral route for 30 days. 60 tablet 0    MULTIVIT-IRON-MIN-FOLIC ACID 3,500-18-0.4  UNIT-MG-MG ORAL CHEW Take 1 tablet by mouth 2 (two) times daily.       nystatin (MYCOSTATIN) powder Apply topically 2 (two) times daily. 60 g 3    oxybutynin (DITROPAN-XL) 5 MG TR24 Take 1 tablet (5 mg total) by mouth once daily. 90 tablet 3    oxyCODONE-acetaminophen (PERCOCET)  mg per tablet Take 1 tablet by mouth every 8 (eight) hours as needed for Pain. 90 tablet 0    pantoprazole (PROTONIX) 40 MG tablet Take 1 tablet (40 mg total) by mouth once daily. 30 tablet 0    prednisoLONE acetate (PRED FORTE) 1 % DrpS Place 1 drop into the right eye 3 (three) times daily.      semaglutide (OZEMPIC) 1 mg/dose (4 mg/3 mL) Inject 1 mg into the skin every 7 days. 3 mL 11    senna-docusate 8.6-50 mg (SENNA WITH DOCUSATE SODIUM) 8.6-50 mg per tablet Take 1 tablet by mouth once daily. 30 tablet 0    tiZANidine (ZANAFLEX) 4 MG tablet TAKE 1 TABLET (4 MG TOTAL) BY MOUTH EVERY 8 (EIGHT) HOURS AS NEEDED (MUSCLE PAIN). 90 tablet 2    urea (CARMOL) 40 % Crea Apply topically once daily. To dry skin on the feet. 120 g 5    vitamin D 185 MG Tab Take 5,000 mg by mouth every morning.       allopurinoL (ZYLOPRIM) 300 MG tablet Take 1 tablet (300 mg total) by mouth 2 (two) times daily. (Patient not taking: Reported on 8/16/2024) 180 tablet 3    FLUoxetine 40 MG capsule Take 1 capsule (40 mg total) by mouth once daily. 90 capsule 3     No current facility-administered medications for this visit.     Facility-Administered Medications Ordered in Other Visits   Medication Dose Route Frequency Provider Last Rate Last Admin    0.9%  NaCl infusion   Intravenous Continuous Lina Wagner MD 25 mL/hr at 12/15/20 1506 25 mL/hr at 12/15/20 1506    0.9%  NaCl infusion   Intravenous Continuous Ciro Chung MD        0.9%  NaCl infusion   Intravenous Continuous Santos Barron MD           REVIEW OF SYSTEMS:    GENERAL:  No weight loss, malaise or fevers.  HEENT:  Negative for frequent or significant headaches.  NECK:  Negative for lumps,  goiter, pain and significant neck swelling.  RESPIRATORY:  Negative for cough, wheezing or shortness of breath.  CARDIOVASCULAR:  Negative for chest pain, leg swelling or palpitations. HTN  GI:  Negative for abdominal discomfort, blood in stools or black stools or change in bowel habits. GERD  MUSCULOSKELETAL:  See HPI.  SKIN:  Negative for lesions, rash, and itching.  PSYCH:  Negative for sleep disturbance, mood disorder and recent psychosocial stressors.  HEMATOLOGY/LYMPHOLOGY:  Negative for prolonged bleeding, bruising easily or swollen nodes.  NEURO:   No history of headaches, syncope, paralysis, seizures or tremors.  ENDO: Diabetes  All other reviewed and negative other than HPI.    Past Medical History:  Past Medical History:   Diagnosis Date    Acute right MCA stroke 01/04/2024    Allergy     Anemia     Asthma     Bilateral shoulder bursitis     Cervical stenosis of spine     Chronic pain     DDD (degenerative disc disease), cervical     Depression 01/28/2019    Diabetes mellitus type II     DJD (degenerative joint disease) of knee 06/19/2014    Facet arthritis of lumbar region 12/17/2015    Facet syndrome 12/17/2015    GERD (gastroesophageal reflux disease)     Heartburn     Hyperlipidemia     Hypertension     Morbid obesity     Neuromuscular disorder     PADDY (obstructive sleep apnea)     Paroxysmal atrial fibrillation 03/19/2024    Proteinuria     Right carpal tunnel syndrome     Sacroiliitis 06/13/2018    Sacroiliitis     Sleep apnea     Uterine fibroid        Past Surgical History:  Past Surgical History:   Procedure Laterality Date    CARPAL TUNNEL RELEASE      CARPAL TUNNEL RELEASE  1980s    left    CARPAL TUNNEL RELEASE  2012    right    CATARACT EXTRACTION W/  INTRAOCULAR LENS IMPLANT Left 08/08/2023    DR. STOKES    CATARACT EXTRACTION W/  INTRAOCULAR LENS IMPLANT Right 08/29/2023        COLONOSCOPY N/A 06/15/2020    Procedure: COLONOSCOPY;  Surgeon: Jc Koo MD;  Location:  [facility] ENDO (4TH FLR);  Service: Endoscopy;  Laterality: N/A;  COVID screening on 6/13/20 at [facility]  pt updated on drop off location and no visitor policy-    EPIDURAL STEROID INJECTION N/A 07/24/2023    Procedure: INJECTION, STEROID, EPIDURAL, L5/S1 SOONER DATE;  Surgeon: Israel Hurtado MD;  Location: [facility] PAIN MGT;  Service: Pain Management;  Laterality: N/A;    ESOPHAGOGASTRODUODENOSCOPY N/A 03/27/2019    Procedure: EGD (ESOPHAGOGASTRODUODENOSCOPY);  Surgeon: Robbin Bar MD;  Location: [facility] ENDO (2ND FLR);  Service: Endoscopy;  Laterality: N/A;  BMI 61.3/2nd floor case/svn    INJECTION OF JOINT Bilateral 06/09/2020    Procedure: INJECTION, JOINT, BILATERAL SI;  Surgeon: Lina Wagner MD;  Location: [facility] PAIN MGT;  Service: Pain Management;  Laterality: Bilateral;  B/L SI Joint Injection    INJECTION OF JOINT Bilateral 05/11/2021    Procedure: INJECTION, JOINT, SACROILIAC (SI) need consent;  Surgeon: Lina Wagner MD;  Location: [facility] PAIN MGT;  Service: Pain Management;  Laterality: Bilateral;    INJECTION OF JOINT Bilateral 5/16/2024    Procedure: INJECTION, JOINT BILATERAL SI AND GTB;  Surgeon: Israel Hurtado MD;  Location: [facility] PAIN MGT;  Service: Pain Management;  Laterality: Bilateral;  [phone]  2 WK F/U BENJI    JOINT REPLACEMENT Bilateral     with 2 revisions on rt    KNEE SURGERY  03/2010    orthroscope    KNEE SURGERY  06/19/2014    left TKR    LAPAROSCOPIC SLEEVE GASTRECTOMY N/A 08/28/2019    Procedure: GASTRECTOMY, SLEEVE, LAPAROSCOPIC with intraop EGD;  Surgeon: Heriberto Clements MD;  Location: [facility] OR 2ND FLR;  Service: General;  Laterality: N/A;    PANNICULECTOMY N/A 06/14/2022    Procedure: PANNICULECTOMY;  Surgeon: Rhett Gray MD;  Location: [facility] OR 2ND FLR;  Service: Plastics;  Laterality: N/A;    RADIOFREQUENCY ABLATION Right 07/09/2019    Procedure: RADIOFREQUENCY ABLATION;  Surgeon: Lina Wagner MD;  Location: [facility] PAIN MGT;  Service: Pain  Management;  Laterality: Right;  RIGHT RFA L2,3,4,5  2 of 2    RADIOFREQUENCY ABLATION Left 12/19/2019    Procedure: RADIOFREQUENCY ABLATION, LEFT L2-L3-L4-L5 MEDIAL BRANCH 1 OF 2;  Surgeon: Lina Wagner MD;  Location: [facility] PAIN MGT;  Service: Pain Management;  Laterality: Left;    RADIOFREQUENCY ABLATION Right 12/31/2019    Procedure: RADIOFREQUENCY ABLATION, RIGHT L2-L3-L4-L5  2 OF 2;  Surgeon: Lina Wagner MD;  Location: [facility] PAIN MGT;  Service: Pain Management;  Laterality: Right;    RADIOFREQUENCY ABLATION Right 10/13/2020    Procedure: RADIOFREQUENCY ABLATION, Genicular Cooled 1 of 2;  Surgeon: Lina Wagner MD;  Location: [facility] PAIN MGT;  Service: Pain Management;  Laterality: Right;    RADIOFREQUENCY ABLATION Left 11/03/2020    Procedure: RADIOFREQUENCY ABLATION, GENICULAR COOLED  2 OF 2;  Surgeon: Lina Wagner MD;  Location: [facility] PAIN MGT;  Service: Pain Management;  Laterality: Left;    RADIOFREQUENCY ABLATION Left 12/15/2020    Procedure: RADIOFREQUENCY ABLATION LEFT L2,3,4,5 1 of 2;  Surgeon: Lina Wagner MD;  Location: [facility] PAIN MGT;  Service: Pain Management;  Laterality: Left;    RADIOFREQUENCY ABLATION Right 12/29/2020    Procedure: RADIOFREQUENCY ABLATION RIGHT L2,3,4,5 2 of 2;  Surgeon: Lina Wagner MD;  Location: [facility] PAIN MGT;  Service: Pain Management;  Laterality: Right;    RADIOFREQUENCY ABLATION Left 06/29/2021    Procedure: RADIOFREQUENCY ABLATION GENICULAR COOLED;  Surgeon: Lina Wagner MD;  Location: [facility] PAIN MGT;  Service: Pain Management;  Laterality: Left;  1 of 2    RADIOFREQUENCY ABLATION Right 07/13/2021    Procedure: RADIOFREQUENCY ABLATION GENICULAR;  Surgeon: Lina Wagner MD;  Location: [facility] PAIN MGT;  Service: Pain Management;  Laterality: Right;  2 of 2    RADIOFREQUENCY ABLATION Right 08/24/2021    Procedure: RADIOFREQUENCY ABLATION, L2-L3-L4-L5 MEDIAL BRANCH 1 OF 2;  Surgeon: Lina Wagner MD;  Location: [facility] PAIN MGT;   Service: Pain Management;  Laterality: Right;    RADIOFREQUENCY ABLATION Left 09/11/2021    Procedure: RADIOFREQUENCY ABLATION, L2-L3-L4-L5 MEDIAL BR ANCH 2 OF 2;  Surgeon: Lina Wagner MD;  Location: [facility] PAIN MGT;  Service: Pain Management;  Laterality: Left;    RADIOFREQUENCY ABLATION Right 03/07/2022    Procedure: RADIOFREQUENCY ABLATION RIGHT L3,4,5 1 of 2, needs consent;  Surgeon: Israel Hurtado MD;  Location: [facility] PAIN MGT;  Service: Pain Management;  Laterality: Right;    RADIOFREQUENCY ABLATION Left 03/21/2022    Procedure: RADIOFREQUENCY ABLATION RIGHT L3,4,5 2 of 2, needs consent;  Surgeon: Israel Hurtado MD;  Location: [facility] PAIN MGT;  Service: Pain Management;  Laterality: Left;    RADIOFREQUENCY ABLATION Right 05/09/2022    Procedure: RADIOFREQUENCY ABLATION RIGHT GENICULAR COOLED 1 of 2;  Surgeon: Israel Hurtado MD;  Location: [facility] PAIN MGT;  Service: Pain Management;  Laterality: Right;    RADIOFREQUENCY ABLATION Left 05/23/2022    Procedure: RADIOFREQUENCY ABLATION LEFT GENICULAR COOLED  2 of 2;  Surgeon: Israel Hurtado MD;  Location: [facility] PAIN MGT;  Service: Pain Management;  Laterality: Left;    RADIOFREQUENCY ABLATION Right 12/12/2022    Procedure: RADIOFREQUENCY ABLATION RIGHT GENICULAR COOLED  ONE OF TWO  NEEDS CONSENT;  Surgeon: Israel Hurtado MD;  Location: [facility] PAIN MGT;  Service: Pain Management;  Laterality: Right;    RADIOFREQUENCY ABLATION Left 01/09/2023    Procedure: RADIOFREQUENCY ABLATION LEFT GENICULAR COOLED  TWO OF TWO NEEDS CONSENT;  Surgeon: Israel Hurtado MD;  Location: [facility] PAIN MGT;  Service: Pain Management;  Laterality: Left;    RADIOFREQUENCY ABLATION Right 03/06/2023    Procedure: RADIOFREQUENCY ABLATION RIGHT L3,L4,L5;  Surgeon: Israel Hurtado MD;  Location: [facility] PAIN MGT;  Service: Pain Management;  Laterality: Right;    RADIOFREQUENCY ABLATION Left 05/22/2023    Procedure: RADIOFREQUENCY ABLATION LEFT L3,L4,L5;  Surgeon: Israel Hurtado MD;  Location: [facility] PAIN MGT;  Service: Pain  Management;  Laterality: Left;  4/3 LM TO R/S    RADIOFREQUENCY ABLATION Right 11/27/2023    Procedure: RADIOFREQUENCY ABLATION RIGHT L3, 4, 5 1 OF 3;  Surgeon: Israel Hurtado MD;  Location: [facility] PAIN MGT;  Service: Pain Management;  Laterality: Right;    RADIOFREQUENCY ABLATION Right 01/22/2024    Procedure: RADIOFREQUENCY ABLATION RIGHT GENICULAR 3 NERVES 3 OF 3;  Surgeon: Israel Hurtado MD;  Location: [facility] PAIN MGT;  Service: Pain Management;  Laterality: Right;    RADIOFREQUENCY ABLATION Left 02/12/2024    Procedure: RADIOFREQUENCY ABLATION LEFT L3, 4, 5;  Surgeon: Israel Hurtado MD;  Location: [facility] PAIN MGT;  Service: Pain Management;  Laterality: Left;  [phone]    RADIOFREQUENCY ABLATION OF LUMBAR MEDIAL BRANCH NERVE AT SINGLE LEVEL Left 06/26/2018    Procedure: RADIOFREQUENCY ABLATION, NERVE, MEDIAL BRANCH, LUMBAR, 1 LEVEL;  Surgeon: Lina Wagner MD;  Location: [facility] PAIN MGT;  Service: Pain Management;  Laterality: Left;  Left Cooled RFA @ L2,3,4,5  [phone]  with Sedation    1 of 2    RADIOFREQUENCY ABLATION OF LUMBAR MEDIAL BRANCH NERVE AT SINGLE LEVEL Right 07/10/2018    Procedure: RADIOFREQUENCY ABLATION, NERVE, MEDIAL BRANCH, LUMBAR, 1 LEVEL;  Surgeon: Lina Wagner MD;  Location: [facility] PAIN MGT;  Service: Pain Management;  Laterality: Right;  RIght Cooled RFA @ L2,3,4,5  [phone] with Sedation    2 of 2    REVISION OF KNEE ARTHROPLASTY Right 7/18/2024    Procedure: REVISION, ARTHROPLASTY, KNEE: RIGHT;  Surgeon: Theodore Swan MD;  [facility];  Service: Orthopedics;  Laterality: Right;    TRIGGER FINGER RELEASE Right 2017    TRIGGER FINGER RELEASE Left 07/29/2019    Procedure: RELEASE, TRIGGER FINGER, Left Thumb;  Surgeon: Velma Garcia MD;  Location: [facility] OR;  Service: Orthopedics;  Laterality: Left;  Local w/ MAC       Family History:  Family History   Problem Relation Name Age of Onset    Diabetes Mother      Cataracts Mother      Diabetes Father       Cataracts Father      Hypertension Sister      Diabetes Sister      No Known Problems Sister      Cancer Sister          lymphoma     Coronary artery disease Brother      Diabetes Brother      Diabetes Brother      Hypotension Brother      Kidney failure Brother      Hypotension Brother      Amblyopia Neg Hx      Blindness Neg Hx      Glaucoma Neg Hx      Macular degeneration Neg Hx      Retinal detachment Neg Hx      Strabismus Neg Hx      Stroke Neg Hx      Thyroid disease Neg Hx      Anesthesia problems Neg Hx         Social History:  Social History     Socioeconomic History    Marital status:    Tobacco Use    Smoking status: Never    Smokeless tobacco: Never   Substance and Sexual Activity    Alcohol use: Not Currently     Comment: occasionally     Drug use: No    Sexual activity: Yes     Partners: Female     Birth control/protection: None   Social History Narrative    Disabled. The patient is the youngest of 6 siblings.  Lives with single-sex partner.                  Social Determinants of Health     Financial Resource Strain: Low Risk  (7/19/2024)    Overall Financial Resource Strain (CARDIA)     Difficulty of Paying Living Expenses: Not very hard   Food Insecurity: No Food Insecurity (7/19/2024)    Hunger Vital Sign     Worried About Running Out of Food in the Last Year: Never true     Ran Out of Food in the Last Year: Never true   Transportation Needs: No Transportation Needs (7/19/2024)    TRANSPORTATION NEEDS     Transportation : No   Physical Activity: Inactive (7/19/2024)    Exercise Vital Sign     Days of Exercise per Week: 0 days     Minutes of Exercise per Session: 0 min   Stress: No Stress Concern Present (7/19/2024)    [place] [employer] - Occupational Stress Questionnaire     Feeling of Stress : Not at all   Housing Stability: Low Risk  (7/19/2024)    Housing Stability Vital Sign     Unable to Pay for Housing in the Last Year: No     Homeless in the Last Year: No        OBJECTIVE:      /85   Pulse (!) 58   Temp 98.5 °F (36.9 °C)   Resp 18   Wt 115.7 kg (255 lb 1.2 oz)   LMP 2018   SpO2 99%   BMI 42.45 kg/m²     PHYSICAL EXAMINATION:    GENERAL: Well appearing, in no acute distress, alert and oriented x3.   PSYCH:  Mood and affect appropriate.   SKIN: Skin color, texture, turgor normal, no rashes or lesions. Well-healing right knee scar.   HEAD/FACE:  Normocephalic, atraumatic.   CV: Rate regular.  PULM: No evidence of respiratory difficulty, symmetric chest rise.  BACK: Negative SLR bilaterally, but positive to posterior thigh tightness and pain. There is pain with palpation over midline lumbar spine. Limited ROM with pain on flexion and extension. Mild tenderness over bilateral SIJ. Negative FABERE. Negative FADIR.  EXTREMITIES: No edema.  Mild TTP over bilateral GTB.   MUSCULOSKELETAL: 4/5 strength in right ankle with plantar and dorsiflexion. 4/5 strength in left ankle with plantar and dorsiflexion. 5/5 strength with right knee flexion and extension. 5/5 strength with left knee flexion and extension. 5/5 strength in right EHL, 5/5 strength in left EHL.  NEURO:  Plantar response are downgoing. No clonus. Normal sensation.   GAIT: Antalgic- ambulates with rolling walker    ASSESSMENT: 58 y.o. year old female with low back and knee pain, consistent with the followin. Chronic pain syndrome        2. S/P revision of total knee, right  acetaminophen (TYLENOL) 650 MG TbSR      3. Spondylosis of lumbar region without myelopathy or radiculopathy        4. DDD (degenerative disc disease), lumbar        5. Sacroiliitis        6. Greater trochanteric bursitis of both hips        7. Lumbar spondylosis        8. Arthritis of facet joint of lumbar spine                PLAN:     - Previous imaging reviewed.     - Refilled Acetaminophen 650 mg q8 hours PRN    - Continue Percocet 10/325 mg TID PRN. Last fill 2024. Dr Hurtado consulted on this patient and would  like to increase her to QID PRN for one month only.    - The patient is here today for a refill of current pain medications and they believe these provide effective pain control and improvements in quality of life.  The patient notes no serious side effects, and feels the benefits outweigh the risks.  The patient was reminded of the pain contract that they signed previously as well as the risks and benefits of the medication including possible death.  The updated [place] Board  Pharmacy prescription monitoring program was reviewed, and the patient has been found to be compliant with current treatment plan. Medication management provided by Dr Hurtado.     - UDS 5/29/2024 reviewed and appropriate. Repeat today.     - Dr. Hurtado was consulted on the patient and agrees with this plan.      - Due for annual visit with MD by 10/2024.    - RTC 3-4 weeks for virtual visit.     - Continue HEP and PT.    - She would would like aquatic therapy once cleared by ortho    The above plan and management options were discussed at length with patient. Patient is in agreement with the above and verbalized understanding.     Anisa Cole NP  08/16/2024

## 2024-08-17 NOTE — TRANSFER OF CARE
"Anesthesia Transfer of Care Note    Patient: Alivia Thomas    Procedure(s) Performed: Procedure(s) (LRB):  COLONOSCOPY (N/A)    Patient location: GI    Anesthesia Type: general    Transport from OR: Transported from OR on room air with adequate spontaneous ventilation    Post pain: adequate analgesia    Post assessment: no apparent anesthetic complications    Post vital signs: stable    Level of consciousness: responds to stimulation and awake    Nausea/Vomiting: no nausea/vomiting    Complications: none    Transfer of care protocol was followed      Last vitals:   Visit Vitals  /67 (BP Location: Left arm, Patient Position: Lying)   Pulse 66   Temp 36.2 °C (97.2 °F) (Temporal)   Resp 14   Ht 5' 5" (1.651 m)   Wt 112.9 kg (249 lb)   LMP 06/26/2018   SpO2 100%   BMI 41.44 kg/m²     " Called and lvm with [place] inter to reschedule the Thursday appt with [facility] provider and RD per moms request. Call center go ahead and reschedule for soonest aviliable.    Thank you   Lee Ann    Patient is a resident of the [facility] - reported to have fallen while taking a shower. Reports hitting head and experiencing pain to the right lower extremity. Accompanied by nurse aide.

## 2024-08-18 NOTE — HOSPITAL COURSE
01/05/2024 ALETHEA. Patient admitted to [facility] for post-TNK monitoring. Neuro exam stable. Patient's MRI negative for acute intracranial processes or recent infarct. Patient w/ resolution of symptoms following TNK, and stable during surveillance period. Patient deemed medically stable for discharge. Patient to continue aspirin/statin therapy out-patient and to follow-up with primary care physician at regular visit for further medical care.     
1/5/2024: Improving neurological exam overnight, pending MRI this afternoon.  Therapies ordered  1/6/24: ok to discharge home  
No

## 2024-08-19 ENCOUNTER — LAB VISIT (OUTPATIENT)
Dept: LAB | Facility: HOSPITAL | Age: 59
End: 2024-08-19
Attending: INTERNAL MEDICINE
Payer: MEDICARE

## 2024-08-19 ENCOUNTER — OFFICE VISIT (OUTPATIENT)
Dept: INTERNAL MEDICINE | Facility: CLINIC | Age: 59
End: 2024-08-19
Payer: MEDICARE

## 2024-08-19 VITALS
OXYGEN SATURATION: 97 % | TEMPERATURE: 64 F | WEIGHT: 243.94 LBS | HEIGHT: 65 IN | DIASTOLIC BLOOD PRESSURE: 64 MMHG | SYSTOLIC BLOOD PRESSURE: 112 MMHG | BODY MASS INDEX: 40.64 KG/M2

## 2024-08-19 DIAGNOSIS — D64.9 ANEMIA, UNSPECIFIED TYPE: Primary | ICD-10-CM

## 2024-08-19 DIAGNOSIS — D64.9 ANEMIA, UNSPECIFIED TYPE: ICD-10-CM

## 2024-08-19 LAB
ALBUMIN SERPL BCP-MCNC: 3.6 G/DL (ref 3.5–5.2)
ALP SERPL-CCNC: 78 U/L (ref 55–135)
ALT SERPL W/O P-5'-P-CCNC: 10 U/L (ref 10–44)
ANION GAP SERPL CALC-SCNC: 5 MMOL/L (ref 8–16)
AST SERPL-CCNC: 12 U/L (ref 10–40)
BASOPHILS # BLD AUTO: 0.05 K/UL (ref 0–0.2)
BASOPHILS NFR BLD: 0.7 % (ref 0–1.9)
BILIRUB SERPL-MCNC: 0.6 MG/DL (ref 0.1–1)
BUN SERPL-MCNC: 19 MG/DL (ref 6–20)
CALCIUM SERPL-MCNC: 9.9 MG/DL (ref 8.7–10.5)
CHLORIDE SERPL-SCNC: 110 MMOL/L (ref 95–110)
CO2 SERPL-SCNC: 29 MMOL/L (ref 23–29)
CREAT SERPL-MCNC: 0.8 MG/DL (ref 0.5–1.4)
DIFFERENTIAL METHOD BLD: ABNORMAL
EOSINOPHIL # BLD AUTO: 0.3 K/UL (ref 0–0.5)
EOSINOPHIL NFR BLD: 3.9 % (ref 0–8)
ERYTHROCYTE [DISTWIDTH] IN BLOOD BY AUTOMATED COUNT: 14.8 % (ref 11.5–14.5)
EST. GFR  (NO RACE VARIABLE): >60 ML/MIN/1.73 M^2
FERRITIN SERPL-MCNC: 120 NG/ML (ref 20–300)
GLUCOSE SERPL-MCNC: 117 MG/DL (ref 70–110)
HCT VFR BLD AUTO: 35.7 % (ref 37–48.5)
HGB BLD-MCNC: 11 G/DL (ref 12–16)
IMM GRANULOCYTES # BLD AUTO: 0.01 K/UL (ref 0–0.04)
IMM GRANULOCYTES NFR BLD AUTO: 0.1 % (ref 0–0.5)
IRON SERPL-MCNC: 96 UG/DL (ref 30–160)
LYMPHOCYTES # BLD AUTO: 1.5 K/UL (ref 1–4.8)
LYMPHOCYTES NFR BLD: 20.5 % (ref 18–48)
MCH RBC QN AUTO: 31.3 PG (ref 27–31)
MCHC RBC AUTO-ENTMCNC: 30.8 G/DL (ref 32–36)
MCV RBC AUTO: 101 FL (ref 82–98)
MONOCYTES # BLD AUTO: 0.5 K/UL (ref 0.3–1)
MONOCYTES NFR BLD: 7.2 % (ref 4–15)
NEUTROPHILS # BLD AUTO: 4.9 K/UL (ref 1.8–7.7)
NEUTROPHILS NFR BLD: 67.6 % (ref 38–73)
NRBC BLD-RTO: 0 /100 WBC
PLATELET # BLD AUTO: 257 K/UL (ref 150–450)
PMV BLD AUTO: 10.9 FL (ref 9.2–12.9)
POTASSIUM SERPL-SCNC: 5.1 MMOL/L (ref 3.5–5.1)
PROT SERPL-MCNC: 8 G/DL (ref 6–8.4)
RBC # BLD AUTO: 3.52 M/UL (ref 4–5.4)
SATURATED IRON: 25 % (ref 20–50)
SODIUM SERPL-SCNC: 144 MMOL/L (ref 136–145)
TOTAL IRON BINDING CAPACITY: 382 UG/DL (ref 250–450)
TRANSFERRIN SERPL-MCNC: 258 MG/DL (ref 200–375)
WBC # BLD AUTO: 7.21 K/UL (ref 3.9–12.7)

## 2024-08-19 PROCEDURE — 36415 COLL VENOUS BLD VENIPUNCTURE: CPT | Mod: PO | Performed by: INTERNAL MEDICINE

## 2024-08-19 PROCEDURE — 99213 OFFICE O/P EST LOW 20 MIN: CPT | Mod: S$PBB,,, | Performed by: INTERNAL MEDICINE

## 2024-08-19 PROCEDURE — 80053 COMPREHEN METABOLIC PANEL: CPT | Performed by: INTERNAL MEDICINE

## 2024-08-19 PROCEDURE — 85025 COMPLETE CBC W/AUTO DIFF WBC: CPT | Performed by: INTERNAL MEDICINE

## 2024-08-19 PROCEDURE — 82728 ASSAY OF FERRITIN: CPT | Performed by: INTERNAL MEDICINE

## 2024-08-19 PROCEDURE — 99999 PR PBB SHADOW E&M-EST. PATIENT-LVL V: CPT | Mod: PBBFAC,,, | Performed by: INTERNAL MEDICINE

## 2024-08-19 PROCEDURE — 99215 OFFICE O/P EST HI 40 MIN: CPT | Mod: PBBFAC,PO | Performed by: INTERNAL MEDICINE

## 2024-08-19 PROCEDURE — 83540 ASSAY OF IRON: CPT | Performed by: INTERNAL MEDICINE

## 2024-08-19 RX ORDER — MIDODRINE HYDROCHLORIDE 5 MG/1
TABLET ORAL
Start: 2024-08-19

## 2024-08-19 NOTE — PROGRESS NOTES
Subjective:       Patient ID: Alivia Thomas is a 58 y.o. female.    Chief Complaint: Hospital Follow Up    HPIPt had a redo knee replacement on the right on 7/18/24 - she went to the ED 8/4/24 with some symptomatic hypotension - unclear etiology.  Metoprolol reduced and started on midodrine.  No bleeding or infection was found.  No CP or SOB.  She had been taking her eliquis religiously.    Review of Systems   Respiratory:  Negative for shortness of breath (PND or orthopnea).    Cardiovascular:  Negative for chest pain (arm pain or jaw pain).   Gastrointestinal:  Negative for abdominal pain, diarrhea, nausea and vomiting.   Genitourinary:  Negative for dysuria.   Neurological:  Negative for seizures, syncope and headaches.       Objective:      Physical Exam  Constitutional:       General: She is not in acute distress.     Appearance: She is well-developed.   HENT:      Head: Normocephalic.   Eyes:      Pupils: Pupils are equal, round, and reactive to light.   Neck:      Thyroid: No thyromegaly.      Vascular: No JVD.   Cardiovascular:      Rate and Rhythm: Normal rate and regular rhythm.      Heart sounds: Normal heart sounds. No murmur heard.     No friction rub. No gallop.   Pulmonary:      Effort: Pulmonary effort is normal.      Breath sounds: Normal breath sounds. No wheezing or rales.   Abdominal:      General: Bowel sounds are normal. There is no distension.      Palpations: Abdomen is soft. There is no mass.      Tenderness: There is no abdominal tenderness. There is no guarding or rebound.   Musculoskeletal:      Cervical back: Neck supple.   Lymphadenopathy:      Cervical: No cervical adenopathy.   Skin:     General: Skin is warm and dry.      Comments: R knee - no erythema - she walked in easily - appears to be healing   Neurological:      Mental Status: She is alert and oriented to person, place, and time.      Deep Tendon Reflexes: Reflexes are normal and symmetric.   Psychiatric:         Behavior:  Behavior normal.         Thought Content: Thought content normal.         Judgment: Judgment normal.         Assessment:       1. Anemia, unspecified type        Plan:   Anemia, unspecified type  -     CBC Auto Differential; Future; Expected date: 08/19/2024  -     Iron and TIBC; Future; Expected date: 08/19/2024  -     Ferritin; Future; Expected date: 08/19/2024  -     Comprehensive Metabolic Panel; Future; Expected date: 08/19/2024    Other orders  -     midodrine (PROAMATINE) 5 MG Tab; One daily  -     apixaban (ELIQUIS) 5 mg Tab; Take 1 tablet (5 mg total) by mouth 2 (two) times daily.  Dispense: 60 tablet; Refill: 6      Recheck anemia - stop metoprolol - reduce midodrine to once daily and follow expectantly -

## 2024-08-19 NOTE — PROGRESS NOTES
"[facility]   Physical Therapy Treatment Note      Name: Alivia Marie Cyr  Clinic Number: [id number]    Therapy Diagnosis:   Encounter Diagnosis   Name Primary?    Impaired ambulation Yes       Physician: Seema Weinberg NP    Visit Date: 8/20/2024    Evaluation Date: 8/13/2024  Authorization Period Expiration: 12/31/2024  Plan of Care Expiration: 10/13/2024  Progress Note Due: 9/13/2024  Date of Surgery: 7/18/2024  Visit # / Visits authorized: 2/29   FOTO: 1/ 3    Precautions: Standard and Diabetes     Time In: 9:12  Time Out: 10:00  Total Billable Time: 48 minutes      PTA Visit #: 0/5   Subjective     Patient reports: did not take pain medication yesterday but she was hurting very bad in the anterior lower leg.   She  was not instructed in a HEP   compliant with home exercise program.  Response to previous treatment: felt fine   Functional change: ongoing    Pain: 8.5-9/10  Location: right knee      Objective      Objective measures taken at progress report unless specified otherwise.     Treatment   Bold=performed  Alivia received the treatments listed below:      therapeutic exercises to develop strength, endurance, ROM, flexibility, posture, and core stabilization for 20 minutes including:  Heel slides 20 x 2  +bridges x10  +seated toe raises 2x10   Heel prop instructed for HEP  Ankle pumps 10 x 2 - for HEP    manual therapy techniques: Joint mobilizations and Soft tissue Mobilization were applied to the: R knee for 0 minutes, including:  R patella mobilization   R distal quadriceps soft tissue mobilization  Patella tendon soft tissue mobilization    neuromuscular re-education activities to improve: Balance, Coordination, Kinesthetic, Sense, Proprioception, and Posture for 28 minutes. The following activities were included:  Quad sets x 10 and x 10 minutes with Russian ES to R quad 10 sec on/off with a towel roll under R knee  Hook lying B hip adduction 10 x 3 x 3" with a ball  Hook " "lying B hip abduction 10 x 3 x 3" with Green TB  +standing weight shifts in walker x2 min    therapeutic activities to improve functional performance for 00  minutes, including:      gait training to improve functional mobility and safety for 0  minutes, including:      Patient Education and Home Exercises       Education provided:   - written HEP    Written Home Exercises Provided: Yes. Exercises were reviewed and Alivia was able to demonstrate them prior to the end of the session.  Alivia demonstrated good  understanding of the education provided. See Electronic Medical Record under Patient Instructions for exercises provided during therapy sessions    Assessment        Alivia presents today with increase in pain secondary to not taking pain medication yesterday, but reports she will return to the medication she was not yet ready to stop. Despite pain, she was able to complete exercise noted in bold about without increase in pain. She demonstartes very good knee flexion range of motion today and was progressed as indicated by + signs above. Patient asking about healing time for stitches - encouraged her to ask her surgeon about this prior to returning to pool to avoid any infection risk. She verbalizes understanding. Continue to progress within tolerance.     Alivia Is progressing well towards her goals.   Patient prognosis is Good.     Patient will continue to benefit from skilled outpatient physical therapy to address the deficits listed in the problem list box on initial evaluation, provide pt/family education and to maximize pt's level of independence in the home and community environment.     Patient's spiritual, cultural and educational needs considered and pt agreeable to plan of care and goals.     Anticipated barriers to physical therapy: compliance    Goals:   Short Term Goals: 4 weeks   Pt will be instructed in an exercise program to address functional deficits related to her L LE Sx  Improve L knee ROM to " 0 degrees of extension  Improve L knee flexion to 100 degrees  Pt will report decreased L knee pain to </= 6/10 at worst     Long Term Goals: 16 weeks   Pt will be independent in a HEP to assist in managing their L LE Sx.   Improve L knee flexion to >/= 110 degrees  Pt will be an independent community ambulator with the least restrictive assistive device.  Pt will report improved functional ability through an improved score on the FOTO knee survey.  Plan     Progress Physical Therapy program to assist Pt with managing her R LE Sx.    Hillary Walsh, PT

## 2024-08-20 ENCOUNTER — CLINICAL SUPPORT (OUTPATIENT)
Dept: REHABILITATION | Facility: HOSPITAL | Age: 59
End: 2024-08-20
Payer: MEDICARE

## 2024-08-20 DIAGNOSIS — R26.2 IMPAIRED AMBULATION: Primary | ICD-10-CM

## 2024-08-20 PROCEDURE — 97110 THERAPEUTIC EXERCISES: CPT | Mod: PO

## 2024-08-20 PROCEDURE — 97112 NEUROMUSCULAR REEDUCATION: CPT | Mod: PO

## 2024-08-21 NOTE — PROGRESS NOTES
"[facility]   Physical Therapy Treatment Note      Name: Alivia Marie Cyr  Clinic Number: [id number]    Therapy Diagnosis:   Encounter Diagnosis   Name Primary?    Impaired ambulation Yes         Physician: Seema Weinberg NP    Visit Date: 8/22/2024    Evaluation Date: 8/13/2024  Authorization Period Expiration: 12/31/2024  Plan of Care Expiration: 10/13/2024  Progress Note Due: 9/13/2024  Date of Surgery: 7/18/2024  Visit # / Visits authorized: 3/29   FOTO: 1/ 3    Precautions: Standard and Diabetes     Time In: 7:20 (late arrival)  Time Out: 8:05  Total Billable Time: 23 minutes      PTA Visit #: 0/5   Subjective     Patient reports: still having pain in the anterior lower leg  She  was not instructed in a HEP   compliant with home exercise program.  Response to previous treatment: felt fine   Functional change: ongoing    Pain: 8.5-9/10  Location: right knee      Objective      Objective measures taken at progress report unless specified otherwise.     Treatment   Bold=performed  Alivia received the treatments listed below:      therapeutic exercises to develop strength, endurance, ROM, flexibility, posture, and core stabilization for 30 minutes including:  Heel slides 20 x 2  bridges x10  seated toe raises 3x10   +standing heel raises 2x10  +nustep x5 min  Heel prop instructed for HEP  Ankle pumps 10 x 2 - for HEP    manual therapy techniques: Joint mobilizations and Soft tissue Mobilization were applied to the: R knee for 0 minutes, including:  R patella mobilization   R distal quadriceps soft tissue mobilization  Patella tendon soft tissue mobilization    neuromuscular re-education activities to improve: Balance, Coordination, Kinesthetic, Sense, Proprioception, and Posture for 15 minutes. The following activities were included:  Quad sets x 10 and x 10 minutes with Russian ES to R quad 10 sec on/off with a towel roll under R knee  Hook lying B hip adduction 10 x 2 x 3" with a " "ball  Hook lying B hip abduction 10 x 2 x 3" with Green TB  standing weight shifts in walker x2 min    therapeutic activities to improve functional performance for 00  minutes, including:      gait training to improve functional mobility and safety for 0  minutes, including:      Patient Education and Home Exercises       Education provided:   - written HEP    Written Home Exercises Provided: Yes. Exercises were reviewed and Alivia was able to demonstrate them prior to the end of the session.  Alivia demonstrated good  understanding of the education provided. See Electronic Medical Record under Patient Instructions for exercises provided during therapy sessions    Assessment     Alivia presents today with continued knee pain, but not quite as bad as when she did not take the pain medication. She was able to complete exercises and reports easier time with knee flexion vs extensions. Added heel raises and nustep today and will continue to progress within her tolerance. No adverse effect reported.     Alivia Is progressing well towards her goals.   Patient prognosis is Good.     Patient will continue to benefit from skilled outpatient physical therapy to address the deficits listed in the problem list box on initial evaluation, provide pt/family education and to maximize pt's level of independence in the home and community environment.     Patient's spiritual, cultural and educational needs considered and pt agreeable to plan of care and goals.     Anticipated barriers to physical therapy: compliance    Goals:   Short Term Goals: 4 weeks   Pt will be instructed in an exercise program to address functional deficits related to her L LE Sx  Improve L knee ROM to 0 degrees of extension  Improve L knee flexion to 100 degrees  Pt will report decreased L knee pain to </= 6/10 at worst     Long Term Goals: 16 weeks   Pt will be independent in a HEP to assist in managing their L LE Sx.   Improve L knee flexion to >/= 110 " degrees  Pt will be an independent community ambulator with the least restrictive assistive device.  Pt will report improved functional ability through an improved score on the FOTO knee survey.  Plan     Progress Physical Therapy program to assist Pt with managing her R LE Sx.    Hillary Walsh, PT

## 2024-08-22 ENCOUNTER — TELEPHONE (OUTPATIENT)
Dept: ORTHOPEDICS | Facility: CLINIC | Age: 59
End: 2024-08-22
Payer: COMMERCIAL

## 2024-08-22 ENCOUNTER — CLINICAL SUPPORT (OUTPATIENT)
Dept: REHABILITATION | Facility: HOSPITAL | Age: 59
End: 2024-08-22
Payer: MEDICARE

## 2024-08-22 DIAGNOSIS — R26.2 IMPAIRED AMBULATION: Primary | ICD-10-CM

## 2024-08-22 DIAGNOSIS — Z96.651 S/P TOTAL KNEE REPLACEMENT, RIGHT: Primary | ICD-10-CM

## 2024-08-22 PROCEDURE — 97112 NEUROMUSCULAR REEDUCATION: CPT | Mod: PO

## 2024-08-22 PROCEDURE — 97110 THERAPEUTIC EXERCISES: CPT | Mod: PO

## 2024-08-23 ENCOUNTER — PATIENT MESSAGE (OUTPATIENT)
Dept: PAIN MEDICINE | Facility: CLINIC | Age: 59
End: 2024-08-23
Payer: COMMERCIAL

## 2024-08-23 DIAGNOSIS — M25.561 CHRONIC PAIN OF BOTH KNEES: ICD-10-CM

## 2024-08-23 DIAGNOSIS — M47.816 SPONDYLOSIS OF LUMBAR REGION WITHOUT MYELOPATHY OR RADICULOPATHY: ICD-10-CM

## 2024-08-23 DIAGNOSIS — M51.36 DDD (DEGENERATIVE DISC DISEASE), LUMBAR: ICD-10-CM

## 2024-08-23 DIAGNOSIS — G89.29 CHRONIC PAIN OF BOTH KNEES: ICD-10-CM

## 2024-08-23 DIAGNOSIS — M17.0 PRIMARY OSTEOARTHRITIS OF BOTH KNEES: ICD-10-CM

## 2024-08-23 DIAGNOSIS — M25.562 CHRONIC PAIN OF BOTH KNEES: ICD-10-CM

## 2024-08-23 DIAGNOSIS — G89.4 CHRONIC PAIN SYNDROME: Primary | ICD-10-CM

## 2024-08-23 RX ORDER — OXYCODONE AND ACETAMINOPHEN 10; 325 MG/1; MG/1
1 TABLET ORAL EVERY 6 HOURS PRN
Qty: 120 TABLET | Refills: 0 | Status: SHIPPED | OUTPATIENT
Start: 2024-08-24

## 2024-08-23 NOTE — TELEPHONE ENCOUNTER
Patient requesting refill on Percocet  Last office visit 8-16-24   shows last refill on 7-25-24  Patient does not have a pain contract on file with [facility] department  Patient last UDS 5-29-24 was not consistent with current therapy       CODEINE  Not Detected  Not Detected  Not Detected Not Detected    Comment: INTERPRETIVE INFORMATION: Codeine, U  Positive Cutoff: 40 ng/mL  Methodology: Mass Spectrometry   MORPHINE  Not Detected  Not Detected  Not Detected Not Detected    Comment: INTERPRETIVE INFORMATION:Morphine, U  Positive Cutoff: 20 ng/mL  Methodology: Mass Spectrometry   6-ACETYLMORPHINE  Not Detected  Not Detected  Not Detected Not Detected    Comment: INTERPRETIVE INFORMATION:6-acetylmorphine, U  Positive Cutoff: 20 ng/mL  Methodology: Mass Spectrometry   OXYCODONE  Not Detected  Present  Present Present    Comment: INTERPRETIVE INFORMATION:Oxycodone, U  Positive Cutoff: 40 ng/mL  Methodology: Mass Spectrometry   NOROYXCODONE  Present  Present  Present Present    Comment: INTERPRETIVE INFORMATION:Noroxycodone, U  Positive Cutoff: 100 ng/mL  Methodology: Mass Spectrometry   OXYMORPHONE  Not Detected  Present  Present Present    Comment: INTERPRETIVE INFORMATION:Oxymorphone, U  Positive Cutoff: 40 ng/mL  Methodology: Mass Spectrometry   NOROXYMORPHONE  Not Detected  Not Detected  Not Detected Not Detected    Comment: INTERPRETIVE INFORMATION:Noroxymorphone, U  Positive Cutoff: 100 ng/mL  Methodology: Mass Spectrometry   HYDROCODONE  Not Detected  Not Detected  Not Detected Not Detected    Comment: INTERPRETIVE INFORMATION:Hydrocodone, U  Positive Cutoff: 40 ng/mL  Methodology: Mass Spectrometry   NORHYDROCODONE  Not Detected  Not Detected  Not Detected Not Detected    Comment: INTERPRETIVE INFORMATION:Norhydrocodone, U  Positive Cutoff: 100 ng/mL  Methodology: Mass Spectrometry   HYDROMORPHONE  Not Detected  Not Detected  Not Detected Not Detected    Comment: INTERPRETIVE  INFORMATION:Hydromorphone, U  Positive Cutoff: 20 ng/mL  Methodology: Mass Spectrometry   BUPRENORPHINE  Not Detected  Not Detected  Not Detected Not Detected    Comment: INTERPRETIVE INFORMATION:Buprenorphine, U  Positive Cutoff: 5 ng/mL  Methodology: Mass Spectrometry   NORUBPRENORPHINE  Not Detected  Not Detected  Not Detected Not Detected    Comment: INTERPRETIVE INFORMATION:Norbuprenorphine, U  Positive Cutoff: 20 ng/mL  Methodology: Mass Spectrometry   FENTANYL  Not Detected  Not Detected  Not Detected Not Detected    Comment: INTERPRETIVE INFORMATION:Fentanyl, U  Positive Cutoff: 2 ng/mL  Methodology: Mass Spectrometry   NORFENTANYL  Not Detected  Not Detected  Not Detected Not Detected    Comment: INTERPRETIVE INFORMATION:Norfentanyl, U  Positive Cutoff: 2 ng/mL  Methodology: Mass Spectrometry   MEPERIDINE METABOLITE  Not Detected  Not Detected  Not Detected Not Detected    Comment: INTERPRETIVE INFORMATION:Meperidine metabolite, U  Positive Cutoff: 50 ng/mL  Methodology: Mass Spectrometry   TAPENTADOL  Not Detected  Not Detected  Not Detected Not Detected    Comment: INTERPRETIVE INFORMATION:Tapentadol, U  Positive Cutoff: 100 ng/mL  Methodology: Mass Spectrometry   TAPENTADOL-O-SULF  Not Detected  Not Detected  Not Detected Not Detected    Comment: INTERPRETIVE INFORMATION:Tapentadol-o-Sulf, U  Positive Cutoff: 200 ng/mL  Methodology: Mass Spectrometry   METHADONE  Negative  Not Detected  Not Detected Not Detected    Comment: Presumptive negative by immunoassay. Testing by mass  spectrometry is available on request.  INTERPRETIVE INFORMATION: Methadone Screen, U  Positive Cutoff: 150 ng/mL  Methodology: Immunoassay   TRAMADOL  Negative  Not Detected  Not Detected Not Detected    Comment: Presumptive negative by immunoassay. Testing by mass  spectrometry is available on request.  INTERPRETIVE INFORMATION:Tramadol Screen, U  Positive Cutoff: 100 ng/mL  Methodology: Immunoassay   AMPHETAMINE  Not Detected   Not Detected  Not Detected Not Detected    Comment: INTERPRETIVE INFORMATION:Amphetamine, U  Positive Cutoff: 50 ng/mL  Methodology: Mass Spectrometry   METHAMPHETAMINE  Not Detected  Not Detected  Not Detected Not Detected    Comment: INTERPRETIVE INFORMATION:Methamphetamine, U  Positive Cutoff: 200 ng/mL  Methodology: Mass Spectrometry   MDMA- ECSTASY  Not Detected  Not Detected  Not Detected Not Detected    Comment: INTERPRETIVE INFORMATION:MDMA, U  Positive Cutoff: 200 ng/mL  Methodology: Mass Spectrometry   MDA  Not Detected  Not Detected  Not Detected Not Detected    Comment: INTERPRETIVE INFORMATION:MDA, U  Positive Cutoff: 200 ng/mL  Methodology: Mass Spectrometry   MDEA- Kait  Not Detected  Not Detected  Not Detected Not Detected    Comment: INTERPRETIVE INFORMATION:MDEA, U  Positive Cutoff: 200 ng/mL  Methodology: Mass Spectrometry   METHYLPHENIDATE  Not Detected  Not Detected  Not Detected Not Detected    Comment: INTERPRETIVE INFORMATION:Methylphenidate, U  Positive Cutoff: 100 ng/mL  Methodology: Mass Spectrometry   PHENTERMINE  Not Detected  Not Detected  Not Detected Not Detected    Comment: INTERPRETIVE INFORMATION:Phentermine, U  Positive Cutoff: 100 ng/mL  Methodology: Mass Spectrometry   BENZOYLECGONINE  Negative  Not Detected  Not Detected Not Detected    Comment: Presumptive negative by immunoassay. Testing by mass  spectrometry is available on request.  INTERPRETIVE INFORMATION:Cocaine Screen, U  Positive Cutoff: 150 ng/mL  Methodology: Immunoassay   ALPRAZOLAM  Not Detected  Not Detected  Not Detected Not Detected    Comment: INTERPRETIVE INFORMATION:Alprazolam, U  Positive Cutoff: 40 ng/mL  Methodology: Mass Spectrometry   ALPHA-OH-ALPRAZOLAM  Not Detected  Not Detected  Not Detected Not Detected    Comment: INTERPRETIVE INFORMATION:Alpha-OH-Alprazolam, U  Positive Cutoff: 20 ng/mL  Methodology: Mass Spectrometry   CLONAZEPAM  Not Detected  Not Detected  Not Detected Not Detected    Comment:  INTERPRETIVE INFORMATION:Clonazepam, U  Positive Cutoff: 20 ng/mL  Methodology: Mass Spectrometry   7-AMINOCLONAZEPAM  Not Detected  Not Detected  Not Detected Not Detected    Comment: INTERPRETIVE INFORMATION:7-Aminoclonazepam, U  Positive Cutoff: 40 ng/mL  Methodology: Mass Spectrometry   DIAZEPAM  Not Detected  Not Detected  Not Detected Not Detected    Comment: INTERPRETIVE INFORMATION:Diazepam, U  Positive Cutoff: 50 ng/mL  Methodology: Mass Spectrometry   NORDIAZEPAM  Not Detected  Not Detected  Not Detected Not Detected    Comment: INTERPRETIVE INFORMATION:Nordiazepam, U  Positive Cutoff: 50 ng/mL  Methodology: Mass Spectrometry   OXAZEPAM  Not Detected  Not Detected  Not Detected Not Detected    Comment: INTERPRETIVE INFORMATION:Oxazepam, U  Positive Cutoff: 50 ng/mL  Methodology: Mass Spectrometry   TEMAZEPAM  Not Detected  Not Detected  Not Detected Not Detected    Comment: INTERPRETIVE INFORMATION:Temazepam, U  Positive Cutoff: 50 ng/mL  Methodology: Mass Spectrometry   Lorazepam  Not Detected  Not Detected  Not Detected Not Detected    Comment: INTERPRETIVE INFORMATION:Lorazepam, U  Positive Cutoff: 60 ng/mL  Methodology: Mass Spectrometry   MIDAZOLAM  Not Detected  Not Detected  Not Detected Not Detected    Comment: INTERPRETIVE INFORMATION:Midazolam, U  Positive Cutoff: 20 ng/mL  Methodology: Mass Spectrometry   ZOLPIDEM  Not Detected  Not Detected  Not Detected Not Detected    Comment: INTERPRETIVE INFORMATION:Zolpidem, U  Positive Cutoff: 20 ng/mL  Methodology: Mass Spectrometry   BARBITURATES  Negative  Not Detected  Not Detected Not Detected    Comment: Presumptive negative by immunoassay. Testing by mass  spectrometry is available on request.  INTERPRETIVE INFORMATION:Barbiturates Screen, U  Positive Cutoff: 200 ng/mL  Methodology: Immunoassay   Creatinine, Urine 20.0 - 400.0 mg/dL 76.2 117.0 R 61.1 66.0 R 112.3 93.0 86.0 R, CM   ETHYL GLUCURONIDE  Negative  Not Detected  Not Detected Not Detected     Comment: Presumptive negative by immunoassay. Testing by mass  spectrometry is available on request.  INTERPRETIVE INFORMATION:Ethyl Glucuronide Screen, U  Positive Cutoff: 500 ng/mL  Methodology: Immunoassay   MARIJUANA METABOLITE  Negative  Not Detected CM  Not Detected Not Detected    Comment: Presumptive negative by immunoassay. Testing by mass  spectrometry is available on request.  INTERPRETIVE INFORMATION: THC (Cannabinoids) Screen, U  Positive Cutoff: 50 ng/mL  Methodology: Immunoassay   PCP  Negative  Not Detected  Not Detected Not Detected    Comment: Presumptive negative by immunoassay. Testing by mass  spectrometry is available on request.  INTERPRETIVE INFORMATION:Phencyclidine Screen, U  Positive Cutoff: 25 ng/mL  Methodology: Immunoassay   CARISOPRODOL  Negative  Not Detected CM  Not Detected CM Not Detected CM    Comment: Presumptive negative by immunoassay. Testing by mass  spectrometry is available on request.  INTERPRETIVE INFORMATION: Carisoprodol Screen, U  Positive Cutoff: 100 ng/mL  Methodology: Immunoassay  The carisoprodol immunoassay has cross-reactivity to  carisoprodol and meprobamate.   Naloxone  Not Detected  Not Detected  Not Detected Not Detected    Comment: INTERPRETIVE INFORMATION:Naloxone, U  Positive Cutoff: 100 ng/mL  Methodology: Mass Spectrometry   Gabapentin  Not Detected  Not Detected  Not Detected Not Detected    Comment: INTERPRETIVE INFORMATION:Gabapentin, U  Positive Cutoff: 3,000 ng/mL  Methodology: Mass Spectrometry   Pregabalin  Not Detected  Not Detected  Not Detected Not Detected    Comment: INTERPRETIVE INFORMATION:Pregabalin, U  Positive Cutoff: 3,000 ng/mL  Methodology: Mass Spectrometry   Alpha-OH-Midazolam  Not Detected  Not Detected  Not Detected Not Detected    Comment: INTERPRETIVE INFORMATION:Alpha-OH-Midazolam, U  Positive Cutoff: 20 ng/mL  Methodology: Mass Spectrometry   Zolpidem Metabolite  Not Detected  Not Detected  Not Detected Not Detected     Comment: INTERPRETIVE INFORMATION:Zolpidem Metabolite, U  Positive Cutoff: 100 ng/mL  Methodology: Mass Spectrometry

## 2024-08-24 ENCOUNTER — EXTERNAL HOME HEALTH (OUTPATIENT)
Dept: HOME HEALTH SERVICES | Facility: HOSPITAL | Age: 59
End: 2024-08-24
Payer: COMMERCIAL

## 2024-08-26 ENCOUNTER — TELEPHONE (OUTPATIENT)
Dept: INTERNAL MEDICINE | Facility: CLINIC | Age: 59
End: 2024-08-26
Payer: COMMERCIAL

## 2024-08-26 ENCOUNTER — OFFICE VISIT (OUTPATIENT)
Dept: URGENT CARE | Facility: CLINIC | Age: 59
End: 2024-08-26
Payer: MEDICARE

## 2024-08-26 ENCOUNTER — TELEPHONE (OUTPATIENT)
Dept: INTERNAL MEDICINE | Facility: CLINIC | Age: 59
End: 2024-08-26

## 2024-08-26 VITALS
HEART RATE: 78 BPM | RESPIRATION RATE: 20 BRPM | WEIGHT: 243.81 LBS | TEMPERATURE: 98 F | HEIGHT: 65 IN | SYSTOLIC BLOOD PRESSURE: 99 MMHG | BODY MASS INDEX: 40.62 KG/M2 | OXYGEN SATURATION: 94 % | DIASTOLIC BLOOD PRESSURE: 69 MMHG

## 2024-08-26 DIAGNOSIS — J06.9 VIRAL URI WITH COUGH: Primary | ICD-10-CM

## 2024-08-26 DIAGNOSIS — J45.20 ASTHMA, WELL CONTROLLED, MILD INTERMITTENT: ICD-10-CM

## 2024-08-26 DIAGNOSIS — R05.9 COUGH, UNSPECIFIED TYPE: ICD-10-CM

## 2024-08-26 LAB
CTP QC/QA: YES
SARS-COV-2 AG RESP QL IA.RAPID: NEGATIVE

## 2024-08-26 PROCEDURE — 87811 SARS-COV-2 COVID19 W/OPTIC: CPT | Mod: QW,S$GLB,, | Performed by: NURSE PRACTITIONER

## 2024-08-26 PROCEDURE — 99203 OFFICE O/P NEW LOW 30 MIN: CPT | Mod: S$GLB,,, | Performed by: NURSE PRACTITIONER

## 2024-08-26 RX ORDER — AZITHROMYCIN 250 MG/1
TABLET, FILM COATED ORAL
Qty: 6 TABLET | Refills: 0 | Status: SHIPPED | OUTPATIENT
Start: 2024-08-26 | End: 2025-01-27

## 2024-08-26 RX ORDER — PROMETHAZINE HYDROCHLORIDE AND DEXTROMETHORPHAN HYDROBROMIDE 6.25; 15 MG/5ML; MG/5ML
5 SYRUP ORAL EVERY 8 HOURS PRN
Qty: 118 ML | Refills: 0 | Status: SHIPPED | OUTPATIENT
Start: 2024-08-26 | End: 2024-09-05

## 2024-08-26 RX ORDER — IPRATROPIUM BROMIDE 21 UG/1
1 SPRAY, METERED NASAL 2 TIMES DAILY
Qty: 30 ML | Refills: 0 | Status: SHIPPED | OUTPATIENT
Start: 2024-08-26 | End: 2024-09-25

## 2024-08-26 RX ORDER — ALBUTEROL SULFATE 90 UG/1
INHALANT RESPIRATORY (INHALATION)
Qty: 18 G | Refills: 1 | Status: SHIPPED | OUTPATIENT
Start: 2024-08-26 | End: 2024-09-24

## 2024-08-26 NOTE — TELEPHONE ENCOUNTER
"Advised patient to get tested for covid. She said she will and inform office of results.----- Message from Clarisse Richardson MD sent at 8/26/2024  2:30 PM CDT -----  Contact: Patient @ [phone]  Please see if she did a COVID swab.  ----- Message -----  From: Delisa Gomez LPN  Sent: 8/26/2024  11:08 AM CDT  To: Clarisse Richardson MD    Spoke with patient, she has been having cough(terrible/non-productive), nasal congestion, bodyaches and chills the entire weekend. She has tried mucinex cold and corcidin. She stated "its seems as if something tries to come up but nothing". Please advise.  ----- Message -----  From: Yeyo Mueller  Sent: 8/26/2024  10:39 AM CDT  To: Debra DASILVA Staff    1MEDICALADVICE     Patient is calling for Medical Advice regarding: Send prescription for cough & throat medicine. Something for severe cold/sinus     How long has patient had these symptoms:    Pharmacy name and phone#:     [facility] [address] [place] [address]  Phone: [phone] Fax: [phone]        Patient wants a call back or thru [facility]: Call     Comments:    Please advise patient replies from provider may take up to 48 hours.  "

## 2024-08-26 NOTE — PROGRESS NOTES
"Subjective:      Patient ID: Alivia Thomas is a 58 y.o. female.    Vitals:  height is 5' 5" (1.651 m) and weight is 110.6 kg (243 lb 13.3 oz). Her oral temperature is 98 °F (36.7 °C). Her blood pressure is 99/69 and her pulse is 78. Her respiration is 20 and oxygen saturation is 94% (abnormal).     Chief Complaint: Cough    Patient reports with a non-productive cough that presented over the weekend and have gradually worsened with body aches and congestion, she reports with taking Mucinex and tylenol for her current symptoms however, she reports with minimal relief.     Provider note begins below:    Patient comes to the clinic with a 3 day history of body aches, sore throat, nasal congestion with postnasal drip.  No known sick contacts.      Cough  This is a new problem. The current episode started in the past 7 days. The problem has been gradually worsening. The problem occurs every few minutes. The cough is Non-productive. Associated symptoms include myalgias, nasal congestion, postnasal drip and a sore throat. Pertinent negatives include no chest pain, chills, ear congestion, ear pain, fever, headaches, heartburn, hemoptysis, rash, rhinorrhea, shortness of breath, sweats, weight loss or wheezing. Nothing aggravates the symptoms. Treatments tried: Mucinex and tylenol. The treatment provided no relief. Her past medical history is significant for asthma. There is no history of bronchiectasis, bronchitis, COPD, emphysema, environmental allergies or pneumonia.       Constitution: Negative for chills and fever.   HENT:  Positive for postnasal drip and sore throat. Negative for ear pain.    Cardiovascular:  Negative for chest pain.   Respiratory:  Positive for cough. Negative for bloody sputum, shortness of breath and wheezing.    Gastrointestinal:  Negative for heartburn.   Musculoskeletal:  Positive for muscle ache.   Skin:  Negative for rash.   Allergic/Immunologic: Negative for environmental allergies. "   Neurological:  Negative for headaches.      Objective:     Physical Exam   Constitutional: She is oriented to person, place, and time. She appears well-developed. She is cooperative.  Non-toxic appearance. She appears ill. No distress.   HENT:   Head: Normocephalic and atraumatic.   Ears:   Right Ear: Hearing, tympanic membrane, external ear and ear canal normal.   Left Ear: Hearing, tympanic membrane, external ear and ear canal normal.   Nose: Rhinorrhea present. No mucosal edema or nasal deformity. No epistaxis. Right sinus exhibits no maxillary sinus tenderness and no frontal sinus tenderness. Left sinus exhibits no maxillary sinus tenderness and no frontal sinus tenderness.   Mouth/Throat: Uvula is midline, oropharynx is clear and moist and mucous membranes are normal. No trismus in the jaw. Normal dentition. No uvula swelling. Cobblestoning present. No oropharyngeal exudate, posterior oropharyngeal edema or posterior oropharyngeal erythema.   Eyes: Conjunctivae and lids are normal. No scleral icterus.   Neck: Trachea normal and phonation normal. Neck supple. No edema present. No erythema present. No neck rigidity present.   Cardiovascular: Normal rate, regular rhythm, normal heart sounds and normal pulses.   Pulmonary/Chest: Effort normal and breath sounds normal. No stridor. No respiratory distress. She has no decreased breath sounds. She has no wheezes. She has no rhonchi. She has no rales.   Abdominal: Normal appearance.   Musculoskeletal: Normal range of motion.         General: No deformity. Normal range of motion.   Neurological: She is alert and oriented to person, place, and time. She exhibits normal muscle tone. Coordination normal.   Skin: Skin is warm, dry, intact, not diaphoretic and not pale.   Psychiatric: Her speech is normal and behavior is normal. Judgment and thought content normal.   Nursing note and vitals reviewed.    Results for orders placed or performed in visit on 08/26/24   SARS  Coronavirus 2 Antigen, POCT Manual Read   Result Value Ref Range    SARS Coronavirus 2 Antigen Negative Negative     Acceptable Yes      *Note: Due to a large number of results and/or encounters for the requested time period, some results have not been displayed. A complete set of results can be found in Results Review.         Assessment:     1. Viral URI with cough    2. Cough, unspecified type        Plan:     Labs ordered at this visit reviewed.     Viral URI with cough  -     promethazine-dextromethorphan (PROMETHAZINE-DM) 6.25-15 mg/5 mL Syrp; Take 5 mLs by mouth every 8 (eight) hours as needed (cough).  Dispense: 118 mL; Refill: 0  -     ipratropium (ATROVENT) 21 mcg (0.03 %) nasal spray; Use 1 spray by Each Nostril route 2 (two) times daily for 7 days  Dispense: 30 mL; Refill: 0    Cough, unspecified type  -     SARS Coronavirus 2 Antigen, POCT Manual Read  -     promethazine-dextromethorphan (PROMETHAZINE-DM) 6.25-15 mg/5 mL Syrp; Take 5 mLs by mouth every 8 (eight) hours as needed (cough).  Dispense: 118 mL; Refill: 0      Patient Instructions   You may continue taking Tylenol (acetaminophen) or ibuprofen for pain and fever.

## 2024-08-27 NOTE — PROGRESS NOTES
"[facility]   Physical Therapy Treatment Note      Name: Alivia Marie Cyr  Clinic Number: [id number]    Therapy Diagnosis:   Encounter Diagnosis   Name Primary?    Impaired ambulation Yes           Physician: Seema Weinberg NP    Visit Date: 8/28/2024    Evaluation Date: 8/13/2024  Authorization Period Expiration: 12/31/2024  Plan of Care Expiration: 10/13/2024  Progress Note Due: 9/13/2024  Date of Surgery: 7/18/2024  Visit # / Visits authorized: 4/29   FOTO: 1/ 3    Precautions: Standard and Diabetes     Time In: 8:09 am  Time Out: 8:57 am  Total Billable Time: 23 minutes      PTA Visit #: 1/5   Subjective     Patient reports: she is feeling bad because of a sinus infection  She  was not instructed in a HEP   compliant with home exercise program.  Response to previous treatment: felt fine   Functional change: ongoing    Pain: 8.5-9/10  Location: right knee      Objective      Objective measures taken at progress report unless specified otherwise.     Treatment   Bold=performed  Alivia received the treatments listed below:      therapeutic exercises to develop strength, endurance, ROM, flexibility, posture, and core stabilization for 23 minutes including:  Heel slides 20 x 2  bridges x10  seated toe raises 3x10   standing heel raises 2x10  nustep x5 min  Heel prop instructed for HEP  Ankle pumps 10 x 2 - for HEP    manual therapy techniques: Joint mobilizations and Soft tissue Mobilization were applied to the: R knee for 0 minutes, including:  R patella mobilization   R distal quadriceps soft tissue mobilization  Patella tendon soft tissue mobilization    neuromuscular re-education activities to improve: Balance, Coordination, Kinesthetic, Sense, Proprioception, and Posture for 25 minutes. The following activities were included:  Quad sets x 10 and x 10 minutes with Russian ES to R quad 10 sec on/off with a towel roll under R knee  Hook lying B hip adduction 10 x 2 x 3" with a " "ball  Hook lying B hip abduction 10 x 2 x 3" with Green TB  +LAQ 2x10  standing weight shifts in walker x2 min    therapeutic activities to improve functional performance for 00  minutes, including:      gait training to improve functional mobility and safety for 0  minutes, including:      Patient Education and Home Exercises       Education provided:   - written HEP    Written Home Exercises Provided: Yes. Exercises were reviewed and Alivia was able to demonstrate them prior to the end of the session.  Alivia demonstrated good  understanding of the education provided. See Electronic Medical Record under Patient Instructions for exercises provided during therapy sessions    Assessment     Alivia continues with right knee pain and not feeling well due to a sinus infection. Quad sets with russian e-stim again today due to limited quad contraction. Added LAQ today with patient unable to achieve full knee extension. Will continue to progress as tolerated.     Alivia Is progressing well towards her goals.   Patient prognosis is Good.     Patient will continue to benefit from skilled outpatient physical therapy to address the deficits listed in the problem list box on initial evaluation, provide pt/family education and to maximize pt's level of independence in the home and community environment.     Patient's spiritual, cultural and educational needs considered and pt agreeable to plan of care and goals.     Anticipated barriers to physical therapy: compliance    Goals:   Short Term Goals: 4 weeks   Pt will be instructed in an exercise program to address functional deficits related to her L LE Sx  Improve L knee ROM to 0 degrees of extension  Improve L knee flexion to 100 degrees  Pt will report decreased L knee pain to </= 6/10 at worst     Long Term Goals: 16 weeks   Pt will be independent in a HEP to assist in managing their L LE Sx.   Improve L knee flexion to >/= 110 degrees  Pt will be an independent community " ambulator with the least restrictive assistive device.  Pt will report improved functional ability through an improved score on the FOTO knee survey.  Plan     Progress Physical Therapy program to assist Pt with managing her R LE Sx.    Richelle Mendez PTA

## 2024-08-28 ENCOUNTER — CLINICAL SUPPORT (OUTPATIENT)
Dept: REHABILITATION | Facility: HOSPITAL | Age: 59
End: 2024-08-28
Payer: MEDICARE

## 2024-08-28 ENCOUNTER — OFFICE VISIT (OUTPATIENT)
Dept: ORTHOPEDICS | Facility: CLINIC | Age: 59
End: 2024-08-28
Payer: MEDICARE

## 2024-08-28 ENCOUNTER — HOSPITAL ENCOUNTER (OUTPATIENT)
Dept: RADIOLOGY | Facility: HOSPITAL | Age: 59
Discharge: HOME | End: 2024-08-28
Attending: ORTHOPAEDIC SURGERY
Payer: MEDICARE

## 2024-08-28 VITALS — WEIGHT: 253.5 LBS | BODY MASS INDEX: 42.24 KG/M2 | HEIGHT: 65 IN

## 2024-08-28 DIAGNOSIS — Z96.651 S/P REVISION OF TOTAL KNEE, RIGHT: Primary | ICD-10-CM

## 2024-08-28 DIAGNOSIS — Z96.651 S/P TOTAL KNEE REPLACEMENT, RIGHT: ICD-10-CM

## 2024-08-28 DIAGNOSIS — R26.2 IMPAIRED AMBULATION: Primary | ICD-10-CM

## 2024-08-28 PROCEDURE — 97112 NEUROMUSCULAR REEDUCATION: CPT | Mod: KX,PO,CQ

## 2024-08-28 PROCEDURE — 99999 PR PBB SHADOW E&M-EST. PATIENT-LVL II: CPT | Mod: PBBFAC,,, | Performed by: ORTHOPAEDIC SURGERY

## 2024-08-28 PROCEDURE — 73562 X-RAY EXAM OF KNEE 3: CPT | Mod: TC,RT

## 2024-08-28 PROCEDURE — 73560 X-RAY EXAM OF KNEE 1 OR 2: CPT | Mod: TC,LT

## 2024-08-28 PROCEDURE — 73560 X-RAY EXAM OF KNEE 1 OR 2: CPT | Mod: 26,59,LT, | Performed by: RADIOLOGY

## 2024-08-28 PROCEDURE — 99024 POSTOP FOLLOW-UP VISIT: CPT | Mod: POP,,, | Performed by: ORTHOPAEDIC SURGERY

## 2024-08-28 PROCEDURE — 99212 OFFICE O/P EST SF 10 MIN: CPT | Mod: PBBFAC,25 | Performed by: ORTHOPAEDIC SURGERY

## 2024-08-28 PROCEDURE — 73562 X-RAY EXAM OF KNEE 3: CPT | Mod: 26,RT,, | Performed by: RADIOLOGY

## 2024-08-28 NOTE — PROGRESS NOTES
Ailvia Thomas is in for 6 week follow up for a  right revision TKA.  She is doing fairly well.  Moderate pain in the knee.   She has been in PT  Exam demonstrates  A well developed female in no distress.  Alert and oriented.  Mood and affect are appropriate.    Knee incision is well healed.  ROM is 0-120 passive.  15 degree extensor lag.  The patella tracks well and there is no instability. The extremity is neurovascularly intact.    Xrays demonstrate a well fixed and positioned prosthesis.         No data to display                     No data to display                     No data to display                    7/1/2024     1:00 PM   Knee Society and Function Score   FINDINGS - KNEE SOCIETY SCORE 50   FINDINGS - KNEE SOCIETY FUNCTION SCORE 50            No data to display                Imp:Progressing    F/u in 6 weeks.  Continue PT.  PROMS on return

## 2024-08-29 ENCOUNTER — DOCUMENT SCAN (OUTPATIENT)
Dept: HOME HEALTH SERVICES | Facility: HOSPITAL | Age: 59
End: 2024-08-29
Payer: COMMERCIAL

## 2024-09-03 ENCOUNTER — EXTERNAL HOME HEALTH (OUTPATIENT)
Dept: HOME HEALTH SERVICES | Facility: HOSPITAL | Age: 59
End: 2024-09-03
Payer: MEDICARE

## 2024-09-03 ENCOUNTER — PATIENT MESSAGE (OUTPATIENT)
Dept: BARIATRICS | Facility: CLINIC | Age: 59
End: 2024-09-03
Payer: COMMERCIAL

## 2024-09-04 ENCOUNTER — CLINICAL SUPPORT (OUTPATIENT)
Dept: REHABILITATION | Facility: HOSPITAL | Age: 59
End: 2024-09-04
Payer: MEDICARE

## 2024-09-04 DIAGNOSIS — R26.2 IMPAIRED AMBULATION: Primary | ICD-10-CM

## 2024-09-04 PROCEDURE — 97110 THERAPEUTIC EXERCISES: CPT | Mod: PO

## 2024-09-04 PROCEDURE — 97140 MANUAL THERAPY 1/> REGIONS: CPT | Mod: PO

## 2024-09-04 PROCEDURE — 97112 NEUROMUSCULAR REEDUCATION: CPT | Mod: PO

## 2024-09-04 NOTE — PROGRESS NOTES
[facility]   Physical Therapy Treatment Note      Name: Alivia Marie Cyr  Clinic Number: [id number]    Therapy Diagnosis:   No diagnosis found.    Physician: Seema Weinberg NP    Visit Date: 9/4/2024    Evaluation Date: 8/13/2024  Authorization Period Expiration: 12/31/2024  Plan of Care Expiration: 10/13/2024  Progress Note Due: 9/13/2024  Date of Surgery: 7/18/2024  Visit # / Visits authorized: 4/29   FOTO: 1/ 3    Precautions: Standard and Diabetes     Time In: 1:02 pm  Time Out: 1:55 pm  Total Billable Time: 55 minutes      PTA Visit #: 1/5   Subjective     Patient reports: she missed her last two appointments.  One because she over slept and the other because she was ill.  She  was not instructed in a HEP   compliant with home exercise program.  Response to previous treatment: felt fine   Functional change: ongoing    Pain: 9/10  Location: right knee - anterior shin    Objective      Objective measures taken at progress report unless specified otherwise.     Treatment   Bold=performed  Alivia received the treatments listed below:      therapeutic exercises to develop strength, endurance, ROM, flexibility, posture, and core stabilization for 20 minutes including:  Heel slides 20 x 2  bridges 2 x 10  seated toe raises 3x10   standing heel raises 2x10  nustep x 6 min  Heel prop instructed for HEP  Ankle pumps 10 x 2 - for HEP    manual therapy techniques: Joint mobilizations and Soft tissue Mobilization were applied to the: R knee for 08 minutes, including:  R patella mobilization   R distal quadriceps soft tissue mobilization  Patella tendon soft tissue mobilization    neuromuscular re-education activities to improve: Balance, Coordination, Kinesthetic, Sense, Proprioception, and Posture for 25 minutes. The following activities were included:  Quad sets 3 x 10 and x 10 minutes with Russian ES to R quad 10 sec on/off with a towel roll under R knee  Hook lying B hip adduction 10 x 3 x  "3" with a ball  Hook lying B hip abduction 10 x  x 3 3" with Green TB  +LAQ 2x10  standing weight shifts in walker x2 min    therapeutic activities to improve functional performance for 00  minutes, including:      gait training to improve functional mobility and safety for 0  minutes, including:      Patient Education and Home Exercises       Education provided:   - written HEP    Written Home Exercises Provided: Yes. Exercises were reviewed and Alivia was able to demonstrate them prior to the end of the session.  Alivia demonstrated good  understanding of the education provided. See Electronic Medical Record under Patient Instructions for exercises provided during therapy sessions    Assessment     Alivia continues to c/o significant right lower leg pain.  She reports that the pain in her knee is as expected, but the pain down her shin is not. R knee extension improved following patella dn soft tissue mobilization about the knee.  Will continue to progress as tolerated.     Alivia Is progressing well towards her goals.   Patient prognosis is Good.     Patient will continue to benefit from skilled outpatient physical therapy to address the deficits listed in the problem list box on initial evaluation, provide pt/family education and to maximize pt's level of independence in the home and community environment.     Patient's spiritual, cultural and educational needs considered and pt agreeable to plan of care and goals.     Anticipated barriers to physical therapy: compliance    Goals:   Short Term Goals: 4 weeks   Pt will be instructed in an exercise program to address functional deficits related to her L LE Sx  Improve L knee ROM to 0 degrees of extension  Improve L knee flexion to 100 degrees  Pt will report decreased L knee pain to </= 6/10 at worst     Long Term Goals: 16 weeks   Pt will be independent in a HEP to assist in managing their L LE Sx.   Improve L knee flexion to >/= 110 degrees  Pt will be an " independent community ambulator with the least restrictive assistive device.  Pt will report improved functional ability through an improved score on the FOTO knee survey.  Plan     Progress Physical Therapy program to assist Pt with managing her R LE Sx.    Beltran Estes, PT

## 2024-09-05 ENCOUNTER — CLINICAL SUPPORT (OUTPATIENT)
Dept: REHABILITATION | Facility: HOSPITAL | Age: 59
End: 2024-09-05
Payer: MEDICARE

## 2024-09-05 DIAGNOSIS — R26.2 IMPAIRED AMBULATION: Primary | ICD-10-CM

## 2024-09-05 PROCEDURE — 97112 NEUROMUSCULAR REEDUCATION: CPT | Mod: PO

## 2024-09-05 PROCEDURE — 97140 MANUAL THERAPY 1/> REGIONS: CPT | Mod: PO

## 2024-09-05 NOTE — PROGRESS NOTES
"[facility]   Physical Therapy Treatment Note      Name: Alivia Marie Cyr  Clinic Number: [id number]    Therapy Diagnosis:   Encounter Diagnosis   Name Primary?    Impaired ambulation Yes       Physician: Seema Weinberg NP    Visit Date: 9/5/2024    Evaluation Date: 8/13/2024  Authorization Period Expiration: 12/31/2024  Plan of Care Expiration: 10/13/2024  Progress Note Due: 9/13/2024  Date of Surgery: 7/18/2024  Visit # / Visits authorized: 6/29   FOTO: 1/ 3    Precautions: Standard and Diabetes     Time In: 8:00 am  Time Out: 8:55 am  Total Billable Time: 55 minutes      PTA Visit #: 1/5   Subjective     Patient reports: no ill effects following yesterday's Tx.  She  was not instructed in a HEP   compliant with home exercise program.  Response to previous treatment: felt fine   Functional change: ongoing    Pain: 9/10  Location: right knee - anterior shin    Objective      Objective measures taken at progress report unless specified otherwise.     Treatment   Bold=performed  Alivia received the treatments listed below:      therapeutic exercises to develop strength, endurance, ROM, flexibility, posture, and core stabilization for 20 minutes including:  Heel slides 20 x 2  bridges 2 x 10  seated toe raises 3x10   standing heel raises 2x10  nustep x 6 min  Heel prop instructed for HEP  Ankle pumps 10 x 2 - for HEP    manual therapy techniques: Joint mobilizations and Soft tissue Mobilization were applied to the: R knee for 08 minutes, including:  R patella mobilization   R distal quadriceps soft tissue mobilization  Patella tendon soft tissue mobilization    neuromuscular re-education activities to improve: Balance, Coordination, Kinesthetic, Sense, Proprioception, and Posture for 25 minutes. The following activities were included:  Quad sets 3 x 10 and x 10 minutes with Russian ES to R quad 10 sec on/off with a towel roll under R knee  Hook lying B hip adduction 10 x 3 x 3" with a " "ball  Hook lying B hip abduction 10 x  x 3 3" with Green TB  +LAQ 2x10  standing weight shifts in walker x2 min    therapeutic activities to improve functional performance for 00  minutes, including:      gait training to improve functional mobility and safety for 0  minutes, including:      Patient Education and Home Exercises       Education provided:   - written HEP    Written Home Exercises Provided: Yes. Exercises were reviewed and Alivia was able to demonstrate them prior to the end of the session.  Alivia demonstrated good  understanding of the education provided. See Electronic Medical Record under Patient Instructions for exercises provided during therapy sessions    Assessment     Alivia continues to c/o right lower leg pain.  She reports that the pain in her knee is as expected, but the pain down her shin is not. R knee extension continues to improve following soft tissue mobilization about the R knee she was able to tolerate all exercises listed above.  Will continue to progress as tolerated.     Alivia Is progressing well towards her goals.   Patient prognosis is Good.     Patient will continue to benefit from skilled outpatient physical therapy to address the deficits listed in the problem list box on initial evaluation, provide pt/family education and to maximize pt's level of independence in the home and community environment.     Patient's spiritual, cultural and educational needs considered and pt agreeable to plan of care and goals.     Anticipated barriers to physical therapy: compliance    Goals:   Short Term Goals: 4 weeks   Pt will be instructed in an exercise program to address functional deficits related to her L LE Sx  Improve L knee ROM to 0 degrees of extension  Improve L knee flexion to 100 degrees  Pt will report decreased L knee pain to </= 6/10 at worst     Long Term Goals: 16 weeks   Pt will be independent in a HEP to assist in managing their L LE Sx.   Improve L knee flexion to >/= 110 " degrees  Pt will be an independent community ambulator with the least restrictive assistive device.  Pt will report improved functional ability through an improved score on the FOTO knee survey.  Plan     Progress Physical Therapy program to assist Pt with managing her R LE Sx.    Beltran Estes, PT

## 2024-09-10 ENCOUNTER — CLINICAL SUPPORT (OUTPATIENT)
Dept: REHABILITATION | Facility: HOSPITAL | Age: 59
End: 2024-09-10
Payer: MEDICARE

## 2024-09-10 ENCOUNTER — TELEPHONE (OUTPATIENT)
Dept: ORTHOPEDICS | Facility: CLINIC | Age: 59
End: 2024-09-10
Payer: COMMERCIAL

## 2024-09-10 ENCOUNTER — PATIENT MESSAGE (OUTPATIENT)
Dept: BARIATRICS | Facility: CLINIC | Age: 59
End: 2024-09-10
Payer: MEDICARE

## 2024-09-10 DIAGNOSIS — R26.2 IMPAIRED AMBULATION: Primary | ICD-10-CM

## 2024-09-10 PROCEDURE — 97112 NEUROMUSCULAR REEDUCATION: CPT | Mod: PO

## 2024-09-10 PROCEDURE — 97110 THERAPEUTIC EXERCISES: CPT | Mod: PO

## 2024-09-10 NOTE — TELEPHONE ENCOUNTER
Called pt and r/s patient appt  time on 10/9/24 due to provider meeting. Pt verbalized understanding and has no further questions.

## 2024-09-10 NOTE — PROGRESS NOTES
[facility]   Physical Therapy Treatment Note / Reassessment     Name: Alivia Marie Cyr  Clinic Number: [id number]    Therapy Diagnosis:   No diagnosis found.      Physician: Seema Weinberg NP    Visit Date: 9/10/2024    Evaluation Date: 8/13/2024  Authorization Period Expiration: 12/31/2024  Plan of Care Expiration: 10/13/2024  Progress Note Due: 10/10/24  Date of Surgery: 7/18/2024  Visit # / Visits authorized: 6/29   FOTO: 1/ 3    Precautions: Standard and Diabetes     Time In: ***  Time Out: ***  Total Billable Time: *** minutes      PTA Visit #: 0/5   Subjective     Patient reports: no ill effects following yesterday's Tx.  She  was not instructed in a HEP   compliant with home exercise program.  Response to previous treatment: felt fine   Functional change: ongoing    Pain: 9/10  Location: right knee - anterior shin    Objective      Observation: Pt entered the clinic ambulating with a RW     Posture: flexed R knee        Range of Motion:   Knee Left active Left Passive Right Active R passive   Flexion nt nt 90 nt   Extension nt nt 30 nt               Lower Extremity Strength  Right LE   Left LE     Knee extension: nt Knee extension: 3/5   Knee flexion: nt Knee flexion: nt   Hip flexion: nt Hip flexion: nt   Hip extension:  nt Hip extension: nt   Hip abduction: nt Hip abduction: nt   Hip adductoin nt Hip adduction nt   Ankle dorsiflexion: nt Ankle dorsiflexion: nt   Ankle plantarflexion: nt Ankle plantarflexion nt            Step down test: nt        Function:     - SLS R: nt  - SLS L: nt  - Squat: nt   - Sit <--> Stand:independent   - Bed Mobility: independent           Joint Mobility: hypomobile L knee  Patellar     Palpation: nt     Sensation: intact     Flexibility:               Ely's test: R = nt degrees ; L = nt degrees              Popliteal Angle: R = nt degrees ; L = nt degrees              Pao's test: R = nt ; L = nt              Maximus test: R = nt ; L = nt     Edema:  "nt     Girth Measurement Joint line 5 cm below 10 cm above   Left nt cm nt cm nt cm   Right nt cm nt cm nt cm         Intake Outcome Measure for FOTO Knee Survey     Therapist reviewed FOTO scores for Alivia Thomas on 8/13/2024.   FOTO report - see Media section or FOTO account episode details.     Intake Score: 14%           Treatment   Bold=performed  Alivia received the treatments listed below:      therapeutic exercises to develop strength, endurance, ROM, flexibility, posture, and core stabilization for *** minutes including:  Heel slides 20 x 2  bridges 2 x 10  seated toe raises 3x10   standing heel raises 2x10  nustep x 6 min  Heel prop instructed for HEP  Ankle pumps 10 x 2 - for HEP    manual therapy techniques: Joint mobilizations and Soft tissue Mobilization were applied to the: R knee for *** minutes, including:  R patella mobilization   R distal quadriceps soft tissue mobilization  Patella tendon soft tissue mobilization    neuromuscular re-education activities to improve: Balance, Coordination, Kinesthetic, Sense, Proprioception, and Posture for *** minutes. The following activities were included:  Quad sets 3 x 10 and x 10 minutes with Russian ES to R quad 10 sec on/off with a towel roll under R knee  Hook lying B hip adduction 10 x 3 x 3" with a ball  Hook lying B hip abduction 10 x  x 3 3" with Green TB  +LAQ 2x10  standing weight shifts in walker x2 min    therapeutic activities to improve functional performance for 00  minutes, including:      gait training to improve functional mobility and safety for 0  minutes, including:      Patient Education and Home Exercises       Education provided:   - written HEP    Written Home Exercises Provided: Yes. Exercises were reviewed and Alivia was able to demonstrate them prior to the end of the session.  Alivia demonstrated good  understanding of the education provided. See Electronic Medical Record under Patient Instructions for exercises provided during " therapy sessions    Assessment     Alivia continues to c/o right lower leg pain.  She reports that the pain in her knee is as expected, but the pain down her shin is not. R knee extension continues to improve following soft tissue mobilization about the R knee she was able to tolerate all exercises listed above.  Will continue to progress as tolerated.     Alivia Is progressing well towards her goals.   Patient prognosis is Good.     Patient will continue to benefit from skilled outpatient physical therapy to address the deficits listed in the problem list box on initial evaluation, provide pt/family education and to maximize pt's level of independence in the home and community environment.     Patient's spiritual, cultural and educational needs considered and pt agreeable to plan of care and goals.     Anticipated barriers to physical therapy: compliance    Goals:   Short Term Goals: 4 weeks   Pt will be instructed in an exercise program to address functional deficits related to her L LE Sx  Improve L knee ROM to 0 degrees of extension  Improve L knee flexion to 100 degrees  Pt will report decreased L knee pain to </= 6/10 at worst     Long Term Goals: 16 weeks   Pt will be independent in a [facility] to assist in managing their L LE Sx.   Improve L knee flexion to >/= 110 degrees  Pt will be an independent community ambulator with the least restrictive assistive device.  Pt will report improved functional ability through an improved score on the FOTO knee survey.  Plan     Progress Physical Therapy program to assist Pt with managing her R LE Sx.    Hillary Walsh, PT

## 2024-09-10 NOTE — PROGRESS NOTES
[facility]   Physical Therapy Treatment Note / Reassessment     Name: Alivia Thomas  Clinic Number: [id number]    Therapy Diagnosis:   Encounter Diagnosis   Name Primary?    Impaired ambulation Yes         Physician: Seema Weinberg NP    Visit Date: 9/10/2024    Evaluation Date: 8/13/2024  Authorization Period Expiration: 12/31/2024  Plan of Care Expiration: 10/13/2024  Progress Note Due: 10/10/24  Date of Surgery: 7/18/2024  Visit # / Visits authorized: 6/29   FOTO: 1/ 3    Precautions: Standard and Diabetes     Time In: 7:45  Time Out: 8:30  Total Billable Time: 38 minutes      PTA Visit #: 0/5   Subjective     Patient reports: spouse noticed the leg was warm this morning.   She  was not instructed in a HEP   compliant with home exercise program.  Response to previous treatment: felt fine   Functional change: ongoing    Pain: 9/10  Location: right knee - anterior shin    Objective      Observation: Pt entered the clinic ambulating with a RW     Posture: flexed R knee        Range of Motion:   Knee Left active Left Passive Right Active R passive   Flexion nt nt 118 nt   Extension nt nt Lacking 4  nt               Lower Extremity Strength  Right LE   Left LE     Knee extension: nt Knee extension: 3/5   Knee flexion: nt Knee flexion: nt   Hip flexion: nt Hip flexion: nt   Hip extension:  nt Hip extension: nt   Hip abduction: nt Hip abduction: nt   Hip adductoin nt Hip adduction nt   Ankle dorsiflexion: nt Ankle dorsiflexion: nt   Ankle plantarflexion: nt Ankle plantarflexion nt            Step down test: nt        Function:     - SLS R: nt  - SLS L: nt  - Squat: nt   - Sit <--> Stand:independent   - Bed Mobility: independent           Joint Mobility: hypomobile L knee  Patellar     Palpation: nt     Sensation: intact     Flexibility:               Ely's test: R = nt degrees ; L = nt degrees              Popliteal Angle: R = nt degrees ; L = nt degrees              Pao's test: R = nt  "; L = nt              Maximus test: R = nt ; L = nt     Edema: nt     Girth Measurement Joint line 5 cm below 10 cm above   Left nt cm nt cm nt cm   Right nt cm nt cm nt cm         Intake Outcome Measure for FOTO Knee Survey     Therapist reviewed FOTO scores for Alivia Thomas on 8/13/2024.   FOTO report - see Media section or FOTO account episode details.     Intake Score: 14%           Treatment   Bold=performed  Alivia received the treatments listed below:      therapeutic exercises to develop strength, endurance, ROM, flexibility, posture, and core stabilization for 23 minutes including:  Heel slides 20 x 2  bridges 2 x 10  +sidelying clamshells 2x10x3"  seated toe raises 3x10   standing heel raises 2x10  nustep x 6 min  +leg press 20# 2x10  Heel prop instructed for HEP  Ankle pumps 10 x 2 - for HEP    manual therapy techniques: Joint mobilizations and Soft tissue Mobilization were applied to the: R knee for 0 minutes, including:  R patella mobilization   R distal quadriceps soft tissue mobilization  Patella tendon soft tissue mobilization    neuromuscular re-education activities to improve: Balance, Coordination, Kinesthetic, Sense, Proprioception, and Posture for 15 minutes. The following activities were included:  Quad sets 3 x 10 and x 10 minutes with Russian ES to R quad 10 sec on/off with a towel roll under R knee  +SAQ 2x10  LAQ +1# 3x10  standing weight shifts in walker x2 min    therapeutic activities to improve functional performance for 00  minutes, including:  +step ups on 2" step (consider adding next visit)    gait training to improve functional mobility and safety for 0  minutes, including:      Patient Education and Home Exercises       Education provided:   - written HEP    Written Home Exercises Provided: Yes. Exercises were reviewed and Alivia was able to demonstrate them prior to the end of the session.  Alivia demonstrated good  understanding of the education provided. See Electronic Medical " Record under Patient Instructions for exercises provided during therapy sessions    Assessment     Alivia continues with right anterior lower leg pain along with knee pain however she notes the knee pain is similar to what she has felt in the past. Alivia's knee range of motion has improved drastically since initial eval and per patient report, is painful but not painful enough to bother her. She tolerated newly added exercises very well today without increases in pain. Will continue to progress as tolerated and consider adding step ups at next visit.     Alivia Is progressing well towards her goals.   Patient prognosis is Good.     Patient will continue to benefit from skilled outpatient physical therapy to address the deficits listed in the problem list box on initial evaluation, provide pt/family education and to maximize pt's level of independence in the home and community environment.     Patient's spiritual, cultural and educational needs considered and pt agreeable to plan of care and goals.     Anticipated barriers to physical therapy: compliance    Goals:   Short Term Goals: 4 weeks   Pt will be instructed in an exercise program to address functional deficits related to her L LE Sx  Improve L knee ROM to 0 degrees of extension  Improve L knee flexion to 100 degrees  Pt will report decreased L knee pain to </= 6/10 at worst     Long Term Goals: 16 weeks   Pt will be independent in a HEP to assist in managing their L LE Sx.   Improve L knee flexion to >/= 110 degrees  Pt will be an independent community ambulator with the least restrictive assistive device.  Pt will report improved functional ability through an improved score on the FOTO knee survey.  Plan     Progress Physical Therapy program to assist Pt with managing her R LE Sx.    Hillary Walsh, PT

## 2024-09-12 ENCOUNTER — TELEPHONE (OUTPATIENT)
Dept: PAIN MEDICINE | Facility: CLINIC | Age: 59
End: 2024-09-12
Payer: COMMERCIAL

## 2024-09-12 ENCOUNTER — CLINICAL SUPPORT (OUTPATIENT)
Dept: REHABILITATION | Facility: HOSPITAL | Age: 59
End: 2024-09-12
Payer: MEDICARE

## 2024-09-12 ENCOUNTER — PATIENT MESSAGE (OUTPATIENT)
Dept: PAIN MEDICINE | Facility: CLINIC | Age: 59
End: 2024-09-12
Payer: MEDICARE

## 2024-09-12 DIAGNOSIS — R26.2 IMPAIRED AMBULATION: Primary | ICD-10-CM

## 2024-09-12 PROCEDURE — 97110 THERAPEUTIC EXERCISES: CPT | Mod: PO,CQ

## 2024-09-12 PROCEDURE — 97112 NEUROMUSCULAR REEDUCATION: CPT | Mod: PO,CQ

## 2024-09-12 NOTE — PROGRESS NOTES
[facility]   Physical Therapy Treatment Note / Reassessment     Name: Alivia Thomas  Clinic Number: [id number]    Therapy Diagnosis:   Encounter Diagnosis   Name Primary?    Impaired ambulation Yes         Physician: Seema Weinberg NP    Visit Date: 9/12/2024    Evaluation Date: 8/13/2024  Authorization Period Expiration: 12/31/2024  Plan of Care Expiration: 10/13/2024  Progress Note Due: 10/10/24  Date of Surgery: 7/18/2024  Visit # / Visits authorized: 8/29   FOTO: 2/ 3    Precautions: Standard and Diabetes     Time In: 10:10  Time Out: 11:10  Total Billable Time: 60 minutes      PTA Visit #: 1/5     Subjective     Patient reports: having a lot of pain today and feels that it is because of the weather.   She  was not instructed in a HEP   compliant with home exercise program.  Response to previous treatment: felt fine   Functional change: ongoing    Pain: 9/10  Location: right knee - anterior shin    Objective      Observation: Pt entered the clinic ambulating with a RW     Posture: flexed R knee        Range of Motion:   Knee Left active Left Passive Right Active R passive   Flexion nt nt 118 nt   Extension nt nt Lacking 4  nt               Lower Extremity Strength  Right LE   Left LE     Knee extension: nt Knee extension: 3/5   Knee flexion: nt Knee flexion: nt   Hip flexion: nt Hip flexion: nt   Hip extension:  nt Hip extension: nt   Hip abduction: nt Hip abduction: nt   Hip adductoin nt Hip adduction nt   Ankle dorsiflexion: nt Ankle dorsiflexion: nt   Ankle plantarflexion: nt Ankle plantarflexion nt            Step down test: nt        Function:     - SLS R: nt  - SLS L: nt  - Squat: nt   - Sit <--> Stand:independent   - Bed Mobility: independent           Joint Mobility: hypomobile L knee  Patellar     Palpation: nt     Sensation: intact     Flexibility:               Ely's test: R = nt degrees ; L = nt degrees              Popliteal Angle: R = nt degrees ; L = nt degrees     "          Pao's test: R = nt ; L = nt              Maximus test: R = nt ; L = nt     Edema: nt     Girth Measurement Joint line 5 cm below 10 cm above   Left nt cm nt cm nt cm   Right nt cm nt cm nt cm         Intake Outcome Measure for FOTO Knee Survey     Therapist reviewed FOTO scores for Alivia Thomas on 8/13/2024.   FOTO report - see Media section or FOTO account episode details.     Intake Score: 14%           Treatment   Bold=performed  Alivia received the treatments listed below:      therapeutic exercises to develop strength, endurance, ROM, flexibility, posture, and core stabilization for 35 minutes including:  Heel slides 20 x 2  bridges 2 x 10  Side lying clamshells 2x10x3"  seated toe raises 3x10   standing heel raises 2x10  nustep x 6 min  leg press 20# 2x10  Heel prop instructed for HEP  Ankle pumps 10 x 2 - for HEP    manual therapy techniques: Joint mobilizations and Soft tissue Mobilization were applied to the: R knee for 0 minutes, including:  R patella mobilization   R distal quadriceps soft tissue mobilization  Patella tendon soft tissue mobilization    neuromuscular re-education activities to improve: Balance, Coordination, Kinesthetic, Sense, Proprioception, and Posture for 25 minutes. The following activities were included:  Quad sets 3 x 10 and x 10 minutes with Russian ES to R quad 10 sec on/off with a towel roll under R knee  SAQ 2x10  LAQ +1# 3x10  standing weight shifts in walker x2 min    therapeutic activities to improve functional performance for 00  minutes, including:  +step ups on 2" step (consider adding next visit)    gait training to improve functional mobility and safety for 0  minutes, including:      Patient Education and Home Exercises       Education provided:   - written HEP    Written Home Exercises Provided: Yes. Exercises were reviewed and Alivia was able to demonstrate them prior to the end of the session.  Alivia demonstrated good  understanding of the education " provided. See Electronic Medical Record under Patient Instructions for exercises provided during therapy sessions    Assessment     Alivia continues with right anterior lower leg pain along with knee pain.  Patient completed her therapy as noted above in bold with no increase in symptoms prior to leaving the clinic.  Will continue to progress as tolerated and consider adding step ups at next visit.     Alivia Is progressing well towards her goals.   Patient prognosis is Good.     Patient will continue to benefit from skilled outpatient physical therapy to address the deficits listed in the problem list box on initial evaluation, provide pt/family education and to maximize pt's level of independence in the home and community environment.     Patient's spiritual, cultural and educational needs considered and pt agreeable to plan of care and goals.     Anticipated barriers to physical therapy: compliance    Goals:   Short Term Goals: 4 weeks   Pt will be instructed in an exercise program to address functional deficits related to her L LE Sx  Improve L knee ROM to 0 degrees of extension  Improve L knee flexion to 100 degrees  Pt will report decreased L knee pain to </= 6/10 at worst     Long Term Goals: 16 weeks   Pt will be independent in a HEP to assist in managing their L LE Sx.   Improve L knee flexion to >/= 110 degrees  Pt will be an independent community ambulator with the least restrictive assistive device.  Pt will report improved functional ability through an improved score on the FOTO knee survey.  Plan     Progress Physical Therapy program to assist Pt with managing her R LE Sx.    Horacio Garvey, PTA

## 2024-09-12 NOTE — TELEPHONE ENCOUNTER
----- Message from Colleen Craig sent at 9/12/2024  9:46 AM CDT -----  Regarding: self  Type:  Patient Returning Call     Who Called:self     Who Left Message for Patient:none     Does the patient know what this is regarding?:pt stating they missed a call, no further info offered     Would the patient rather a call back or a response via [facility]?  Call back if pt doesn't answer they are asking for a portal msg     Best Call Back Number: [phone]       Additional Information:     Thank you.

## 2024-09-13 ENCOUNTER — OFFICE VISIT (OUTPATIENT)
Dept: PAIN MEDICINE | Facility: CLINIC | Age: 59
End: 2024-09-13
Payer: MEDICARE

## 2024-09-13 DIAGNOSIS — Z96.651 S/P REVISION OF TOTAL KNEE, RIGHT: ICD-10-CM

## 2024-09-13 DIAGNOSIS — Z96.653 STATUS POST TOTAL BILATERAL KNEE REPLACEMENT: ICD-10-CM

## 2024-09-13 DIAGNOSIS — G89.4 CHRONIC PAIN SYNDROME: Primary | ICD-10-CM

## 2024-09-13 PROCEDURE — 99214 OFFICE O/P EST MOD 30 MIN: CPT | Mod: 95,,,

## 2024-09-13 NOTE — PROGRESS NOTES
Chronic Pain-Tele-Medicine-Established Note (Follow up visit)        The patient location is: Home  The chief complaint leading to consultation is: knee pain  Visit type: Virtual visit with synchronous audio and video  Total time spent with patient: 8 min  Each patient to whom he or she provides medical services by telemedicine is:  (1) informed of the relationship between the physician and patient and the respective role of any other health care provider with respect to management of the patient; and (2) notified that he or she may decline to receive medical services by telemedicine and may withdraw from such care at any time.         SUBJECTIVE:    Interval History 9/13/2024:  Alivia Thomas returns to clinic virtually for follow-up after right TKA and post-operative pain. She states she has been doing well in physical therapy biweekly and has been doing home exercises most days. She states she is healing well and hoping to progress to aquatic therapy once finished with physical therapy for her knee. She has been on Percocet 10/325 4 times per day for the last month which has helped her tolerate and progress in physical therapy. She has about 1 week left of this last prescription. She believes she could probably tolerate decreasing her dose slightly in hopes of getting down to her previous dose of Percocet 10/325 TID PRN. She denies any recent health changes. She denies recent falls or trauma. She denies new onset fever/night sweats, urinary incontinence, bowel incontinence, significant weight changes, significant motor weakness or changes, or loss of sensations. Her pain today is 7/10.     Interval History 8/16/2024:  Alivia Thomas returns to clinic for follow-up of chronic lower back pain. She is s/p right TKA revision on 7/18/2024. She continues Percocet with mild relief. She has been having increased pain since her TKA and with associated physical therapy. She is wondering if her dose could be  temporarily increased. She also takes Tylenol arthritis with good relief. She denies any perceived side effects. She denies recent falls or trauma. She denies new onset fever/night sweats, urinary incontinence, bowel incontinence, significant weight changes, significant motor weakness or changes, or loss of sensations. Her pain today is 8/10.     Interval History 5/29/2024:  Alivia Thomas returns to clinic s/p Bilateral SIJ and GTB injections on 5/16/2024.  She reports 70% relief. She is able to complete ADLs with limited pain and her quality of life is improved. She takes Percocet 10/325 TID PRN with good relief. She denies any perceived side effects. She is going to restart water aerobics at her local pool.  She is having a right TKA revision in July.  After healing from that surgery is complete, she would like to start aquatic therapy for her back. The patient denies fever/night sweats, urinary incontinence, bowel incontinence, significant weight changes, significant motor weakness or changes, or loss of sensations. Her pain today is 4/10.    Interval History 4/29/2024:  Alivia Thomas returns today for follow-up of back pain and right knee pain.  She denies any radicular pain.  She notices bilateral lower back pain with pain into the buttocks and lateral thighs. She takes Percocet TID PRN with good relief.  She denies any perceived side effects. Her pharmacy has been out of the medication and she has been out since 3/10/2024.  The patient denies fever/night sweats, urinary incontinence, bowel incontinence, significant weight changes, significant motor weakness or changes, or loss of sensations. Her pain today is 9/10.    Interval History 1/9/2024:  The patient returns to clinic today for follow up of back pain via virtual visit. She is s/p right L3,4,5 RFA on 11/27/2023. She reports 40-50% relief at this time. She did not have left sided RFA due to illness. Of note, she had a stroke last week. She began  having a headache, left sided facial drooping, and slurring of her speech. She did go to the ER where she received TPA. She reports mild weakness into her left arm. She continues to report low back pain. She denies any radicular leg pain. Her pain is worse with activity. She also reports bilateral knee pain, worse with standing and walking. She is scheduled for genicular RFA in the next few weeks. She will reschedule left lumbar RFA. She is taking Percocet as needed with benefit. She denies any adverse effects. She denies any other health changes.      Interval History 10/24/23:  Alivia Tohmas returns today for follow-up. Since last seen, they report their pain has been worsening. She last received a toradol shot instead of an epidural as she did not wish to have an epidural.  Today, she is here for follow-up with me to evaluate.  She is requesting to increase her oxycodone to 4 times a day instead of 3 times a day.  I had a long discussion with her and advised her we either change the medication but not increase it, try repeating radiofrequency ablation since it worked well for her and/or spinal cord stimulator.  Yesterday, surgery was another alternative for managing her pain.  She had a CT scan that we went over the results.  She has multilevel degenerative disease with severe facet degenerative disease and multilevel spinal and foraminal stenosis.  Yesterday, she declined the surgery.  Today she is open to repeating the radiofrequency ablation but she does not want to change the medication.  Previously, the radiofrequency ablation lasted several months up to a year.  The last time she said it lasted about 3-4 month than the pain started coming back gradually over the following 3-4 months.  Overall, she still had good relief.  She is complaining of the right knee as well.  Much less pain on the left knee.  The radicular component of her back pain is in the dorsal thighs bilaterally worse on the right  negligible on the left.  No new bowel or bladder symptomatology.  No fever, chills or night sweats.    Interval History 10/23/23: Alivia Thomas returns for follow up. This is a patient of Dr. Hurtado. Her visit with [place] was cancelled last week so she was placed on my schedule today. At her last visit she was having pain radiating down her legs, but did not want to have an epidural. They performed a Toradol shot (short term relief) and referred her to Neurosurgery. She saw Dr. Bartlett today who said either she will need SCS or decompression. On review of her CT and MRI, she has severe facet arthropathy and severe canal stenosis at L3/4 and L4/5. She notes being able to walk <1 block, after which she must sit down. She reports 8-10/10 pain and this is interfering with her life significantly.     Interval History 9/18/2023:  The patient presents today to request an injection for increased back pain. She has been having worsened pain over the past couple of weeks. She does not wish to have another KERI at this time. She is having lower back pain with radiation down the back of both legs, R>L. She has associated numbness and tingling as well as subjective weakness. No bowel or bladder incontinence. She has not seen neurosurgery. Her pain today is 10/10.    Interval History 8/14/2023:  The patient is here for follow up after procedure for back pain. She is now s/p L5/S1 IL KERI with about 50% relief. She still has lower back pain with radiation down the back of the legs, stopping at the knees. She has associated numbness to lower extremities. Her pain worsens with walking, bending and reaching upward. She has been working on home exercises and stretches without much benefit. She has some benefit with medication as needed.  She report Her pain today is 9/10.    Interval History 7/10/2023:  The patient is here today for evaluation of increased lower back pain. She has a long-standing history of back pain which is mainly  axial. She has had worsened symptoms over the past couple of weeks, most severe over the past 3 days. The pain now radiates down the back of both legs with burning and numbness. She finds it difficult to stand or walk for any length of time. No bowel or bladder incontinence. No fever or malaise. Medications are helping somewhat. She continues with home PT exercises as previously taught in PT. Her pain today is 10/10.    Interval History 4/10/2023:  The patient returns to clinic today for follow up of low back and knee pain via virtual visit. She is s/p right L3,4,5 RFA on 3/6/2023. Her left side procedure was rescheduled due to illness. This is scheduled for May 1st. She reports some relief of her right sided low back pain. She continues to report left sided low back pain. She denies any radicular leg pain. She does endorse morning stiffness. She continues to perform a home exercise routine. She is taking Percocet as needed for severe pain. She denies any other health changes.     Interval History 1/30/2023:  The patient returns to clinic today for follow up of low back and knee pain. She is s/p right genicular RFA on 12/12/2022 and left genicular RFA on 1/9/2023. She reports 60% relief of her knee pain. She reports intermittent bilateral knee pain, this is tolerable at this time. She reports increased low back pain. Her pain is constant and aching in nature. Her leg pain is much improved. Her pain is worse with moving from sitting to standing. She does endorse morning stiffness. She continues to participate in physical therapy and a home exercise routine. She continues to take Percocet as needed with benefit and without adverse effects. She denies any other health changes.     Interval History 11/22/2022:  The patient returns to clinic today for follow up of low back and knee pain. She continues to report low back pain that radiates into the posterior aspect of both legs to under her feet. Her pain is worse with  moving from sitting to standing. She also endorses morning stiffness. She recently started physical therapy. She has completed one session. She continues to report bilateral knee pain. She endorses worsening pain with the cold weather. Her pain is worse with standing and walking. She continues to take Percocet as needed with benefit and without adverse effects. She denies any other health changes. Her pain today is 8/10.    Interval History 10/5/2022:  She returns for follow-up.  Her knees are doing well.  She has some discomfort but not enough to do procedures.  Her main complaint is lumbar spine pain for the past few weeks that is radiating to the dorsal aspect of both lower extremities.  She has no red flag signs/symptoms.  No new bowel or bladder symptomatology.  The pain radiates from the back down to the plantar aspect of both feet.  It is activity related.  Rest helps some but it does not resolve the pain.  She has not been in therapy for a while.  No recent imaging.  No recent weight changes.  Otherwise, no new symptomatology.  She goes regularly to her primary care physician for health maintenance.    Interval History 8/9/2022:  Alivia Thomas presents to the clinic for a follow-up appointment for low back and knee pain. She is s/p right genicular RFA on 5/9/2022 and left genicular RFA on 5/22/2022. She reports 60% relief of her knee pain. She also had panniculectomy on 6/14/2022. She is healing well. She continues to report intermittent low back pain, worse with prolonged activity. She denies any radicular leg pain. Her pain is worse prolonged standing and walking. She continues to perform a homoe exercise routine. She continues to take Percocet as needed with benefit and without adverse effects. She denies any other health changes. Her pain today is 7/10.    Interval History 4/9/2022:  The patient returns to clinic today for follow-up bilateral L3, 4, 5 RFA last month.  She reports that she is feeling  very well following the procedure and reports 60-70% pain relief.  Today, she reports that her bilateral knee pain has continued to worsen, and she would like to go ahead and repeat bilateral genicular RFA as soon as possible.  She denies any new numbness, tingling, weakness, saddle anesthesia or bowel/bladder incontinence.    Interval History 12/28/2021:  The patient returns to clinic today for follow up via virtual visit. She continues to report bilateral knee pain. This pain is worse with prolonged standing and walking. She continues to report low back and buttock pain. She denies any radicular leg pain. She continues to report a home exercise routine. She continues to report benefit with Percocet. She denies any adverse effects. She denies any other health changes.    Pain Disability Index Review:      5/29/2024     3:08 PM 10/23/2023     3:37 PM 9/18/2023     1:25 PM   Last 3 PDI Scores   Pain Disability Index (PDI) 55 27 24       Pain Medications:  Percocet 10/325 mg TID PRN pain    Opioid Contract: yes     report:  Reviewed and consistent with medication use as prescribed.    Pain Procedures:   steroid inj and visco-supplementation (series of 3) in left knee- not helpful  3/31/14 Bilateral genicular nerve blocks  2/16/16 Bilateral L2,3,4,5 MBB  3/1/16 Bilateral L2,3,4,5 MBB   4/6/16 Left L2,3,4,5 RFA- significant relief  4/20/16 Right L2,3,4,5 RFA- significant relief  10/5/16 Left L2,3,4,5 cooled RFA  10/19/16 Right L2,3,4,5 cooled RFA  4/26/17 Left L2,3,4,5 cooled RFA  5/9/17 Right L2,3,4,5 cooled RFA  12/26/2017- Left L2,3,4,5 cooled RFA  1/9/2018- Right L2,3,4,5 cooled RFA  6/26/18 Left L2,3,4,5 cooled RFA- 60% relief  7/10/18 Right L2,3,4,5 cooled RFA- 60% relief  9/28/18 TPIs- significant relief  12/27/18 Left L2,3,4,5 RFA- 70% relief  1/29/19 Right L2,3,4,5 RFA- 70% relief  6/18/19 Left L2,3,4,5 RFA- 70% relief  7/9/19 Right L2,3,4,5 RFA- 50% relief  12/19/19 Left L2,3,4,5 RFA- 80% relief  12/31/19  Right L2,3,4,5 RFA- 50% relief  3/2/2020- Right genicular nerve block- 70% relief for one month  3/12/20 Left subacromial bursa injection- 90% relief  5/18/2020- Left genicular nerve block- 70%  6/9/2020- Bilateral SI joint injections- 50% relief  10/13/2020- Right genicular RFA- 50% relief   11/3/2020- Left genicular RFA- 50% relief  12/15/2020- Left L2,3,4,5 RFA  12/29/2020- Right L2,3,4,5 RFA  4/26/2021- Left subacromial bursa'  5/11/2021- Bilateral SI joint injections   6/28/2021- Left genicular RFA  7/13/2021- Right genicular RFA  8/24/2021- Right L2,3,4,5 RFA  9/11/2021- Left L2,3,4,5 RFA  3/7/2022- Right L2,3,4,5 RFA  3/21/2022- Left L2,3,4,5 RFA  5/9/2022- Right genicular RFA  5/23/2022- Left genicular RFA  12/12/2022- Right genicular RFA  1/9/2023- Left genicular RFA  3/6/2023- Right L3,4,5 RFA  7/24/23 L5/S1 IL KERI- 50% relief  11/27/2023- Right L3,4,5 RFA  1/22/2024 - Right Genicular - 60% relief  2/12/2024 - Left L3, L4, L5 RFA - 60% relief   5/16/2024 - Bilateral SIJ and GTB - 70% relief    Physical Therapy/Home Exercise: yes    Imaging:   MRI Lumbar Spine 7/12/2023:  COMPARISON:  10/6/22.     FINDINGS:  Alignment: Grade 1 anterolisthesis at L3-L4 and L4-L5.  No evidence for spondylolysis.     Vertebrae: Degenerative endplate changes throughout the lower lumbar spine.  Hemangioma noted within the L5 vertebral body.  No fracture or marrow infiltrative process.     Discs: Moderate disc height loss at L3-S1.  No evidence for discitis.     Cord: Conus terminates at T12-L1 and appears unremarkable.  Cauda equina appears unremarkable.     Degenerative findings:     T12-L1: No spinal canal stenosis or neuroforaminal narrowing.     L1-L2: Mild facet arthropathy results in mild right neural foraminal narrowing.     L2-L3: Circumferential disc bulge and mild facet arthropathy result in mild effacement of the thecal sac and mild bilateral neural foraminal narrowing.     L3-L4: Uncovering of the intervertebral disc  with bulge and severe facet arthropathy result in severe spinal canal stenosis and severe left, moderate right neural foraminal narrowing.     L4-L5: Uncovering of the intervertebral disc with bulge and severe facet arthropathy result in severe spinal canal stenosis and severe left, moderate right neural foraminal narrowing.     L5-S1: Circumferential disc bulge and mild facet arthropathy.  No spinal canal stenosis or neural foraminal narrowing.     Paraspinal muscles & soft tissues: Mild paraspinal muscle atrophy.  Partially visualized markedly enlarged leiomyomatous uterus noted.  There is asymmetric enlargement of the right kidney.     Impression:     1. Multilevel advanced degenerative change of the lumbar spine.  Severe spinal canal stenosis and moderate to severe neural foraminal narrowing noted at L3-L5.  2. Partially visualized markedly enlarged leiomyomatous uterus.  Recommend further evaluation pelvic ultrasound.  3. Asymmetric enlargement of the right kidney.  Recommend further evaluation with retroperitoneal ultrasound.  This report was flagged in Epic as abnormal.    Xray Knee 3/6/2019:  FINDINGS:  Postop bilateral knee replacements.  The the the the irregularity about the left patella with soft tissue calcifications similar.  Small joint effusion.  Some irregularity of the right patella unchanged as well.     IMPRESSION:      Postop bilateral knee replacement with irregularity about the patella as above.    MRI Cervical Spine 4/24/2018:  FINDINGS:  There is reversal of the normal cervical lordosis which could be related to patient positioning versus muscle spasm.     Cervical vertebral body heights are well maintained without evidence of acute fracture or dislocation.  Marrow signal is unremarkable.     Spinal canal contents are unremarkable.  No abnormal masses or collections.     Individual levels as detailed below:     C2-3: No significant spinal canal stenosis or neuroforaminal narrowing.     C3-4:  Facet arthropathy.  No significant spinal canal stenosis or neuroforaminal narrowing.     C4-5: Facet arthropathy.  Small broad-based disc osteophyte complex.  Mild right neuroforaminal narrowing.  Mild spinal canal stenosis.     C5-6: Facet arthropathy.  Uncovertebral joint spurring.  Broad-based posterior disc osteophyte complex.  Mild right neuroforaminal narrowing.  Mild spinal canal stenosis.     C6-7: Facet arthropathy.  No significant spinal canal stenosis or neuroforaminal narrowing.     C7-T1: No significant spinal canal stenosis or neuroforaminal narrowing.     Paraspinal muscles are unremarkable.  Soft tissues are intact.     IMPRESSION:      Mild multilevel cervical spondylosis with mild spinal canal stenosis and mild right neuroforaminal narrowing at C4-5 and C5-6.    Xray Lumbar Spine 9/21/2015:  Results: AP, lateral neutral, lateral flexion , lateral extension, bilateral oblique and spot views.  The alignment of the lumbar spine demonstrates a mild levoscoliosis .  11-mm anterior listhesis of L4 relative to L5 with no translational abnormalities   seen on flexion and extension views.  The vertebral body heights  are well-maintained  , mild disk space narrowing L4-L5 and L5-S1.   Mild anterior and marginal osteophyte formation seen  throughout the lumbar spine  .  The oblique views demonstrate no   definite spondylolysis.  There is facet joint osseous hypertrophy noted at L3-L4 and L4-L5.  IMPRESSION:         The Significant spondylosis of the lumbar spine with grade 1 anterior listhesis L4 relative to L5.  Facet joint osseous hypertrophy L3-L4 and L4-5.    Allergies:   Review of patient's allergies indicates:   Allergen Reactions    Strawberry Anaphylaxis       Current Medications:   Current Outpatient Medications   Medication Sig Dispense Refill    acetaminophen (TYLENOL) 650 MG TbSR Take 1 tablet (650 mg total) by mouth every 8 (eight) hours. 90 tablet 0    albuterol (VENTOLIN HFA) 90 mcg/actuation  inhaler INHALE TWO PUFFS INTO LUNGS EVERY 4 TO 6 HOURS AS NEEDED FOR SHORTNESS OF BREATH AND FOR WHEEZING 18 g 1    allopurinoL (ZYLOPRIM) 300 MG tablet Take 1 tablet (300 mg total) by mouth 2 (two) times daily. 180 tablet 3    apixaban (ELIQUIS) 5 mg Tab Take 1 tablet (5 mg total) by mouth 2 (two) times daily. 60 tablet 6    atorvastatin (LIPITOR) 80 MG tablet Take 1 tablet (80 mg total) by mouth once daily. 90 tablet 3    azithromycin (Z-MICKI) 250 MG tablet Two today then one daily thereafter 6 tablet 0    b complex vitamins tablet Take 1 tablet by mouth every other day.       blood pressure monitor (BLOOD PRESSURE KIT) Kit 1 kit by Misc.(Non-Drug; Combo Route) route Daily. Check blood pressure daily 1 each 0    calcium citrate/vitamin D2 (ISIAH-CITRATE ORAL) Take 500 mg by mouth once daily.      colchicine (MITIGARE) 0.6 mg Cap One daily as needed for gout flare (Patient taking differently: One daily) 90 capsule 1    cyanocobalamin (VITAMIN B-12) 1000 MCG tablet Take 100 mcg by mouth every morning.      diclofenac sodium (VOLTAREN) 1 % Gel Apply 2 g topically 4 (four) times daily as needed. To painful area on the feet. 500 g 5    FLUoxetine 40 MG capsule Take 1 capsule (40 mg total) by mouth once daily. 90 capsule 3    fluticasone propionate (FLONASE) 50 mcg/actuation nasal spray 1 SPRAY BY EACH NOSTRIL ROUTE 2 TIMES DAILY AS NEEDED FOR RHINITIS. 48 g 3    ipratropium (ATROVENT) 21 mcg (0.03 %) nasal spray Use 1 spray by Each Nostril route 2 (two) times daily for 7 days 30 mL 0    LIDOcaine (XYLOCAINE) 5 % Oint ointment APPLY TOPICALLY AS NEEDED. 35.44 g 6    midodrine (PROAMATINE) 5 MG Tab One daily      MULTIVIT-IRON-MIN-FOLIC ACID 3,500-18-0.4 UNIT-MG-MG ORAL CHEW Take 1 tablet by mouth 2 (two) times daily.       nystatin (MYCOSTATIN) powder Apply topically 2 (two) times daily. 60 g 3    oxybutynin (DITROPAN-XL) 5 MG TR24 Take 1 tablet (5 mg total) by mouth once daily. 90 tablet 3    oxyCODONE-acetaminophen  (PERCOCET)  mg per tablet Take 1 tablet by mouth every 6 (six) hours as needed for Pain (for severe pain). 120 tablet 0    prednisoLONE acetate (PRED FORTE) 1 % DrpS Place 1 drop into the right eye 3 (three) times daily.      semaglutide (OZEMPIC) 1 mg/dose (4 mg/3 mL) Inject 1 mg into the skin every 7 days. 3 mL 11    senna-docusate 8.6-50 mg (SENNA WITH DOCUSATE SODIUM) 8.6-50 mg per tablet Take 1 tablet by mouth once daily. 30 tablet 0    tiZANidine (ZANAFLEX) 4 MG tablet TAKE 1 TABLET (4 MG TOTAL) BY MOUTH EVERY 8 (EIGHT) HOURS AS NEEDED (MUSCLE PAIN). 90 tablet 2    urea (CARMOL) 40 % Crea Apply topically once daily. To dry skin on the feet. 120 g 5    vitamin D 185 MG Tab Take 5,000 mg by mouth every morning.        No current facility-administered medications for this visit.     Facility-Administered Medications Ordered in Other Visits   Medication Dose Route Frequency Provider Last Rate Last Admin    0.9%  NaCl infusion   Intravenous Continuous Lina Wagner MD 25 mL/hr at 12/15/20 1506 25 mL/hr at 12/15/20 1506    0.9%  NaCl infusion   Intravenous Continuous Ciro Chung MD        0.9%  NaCl infusion   Intravenous Continuous Santos Barron MD           REVIEW OF SYSTEMS:    GENERAL:  No weight loss, malaise or fevers.  HEENT:  Negative for frequent or significant headaches.  NECK:  Negative for lumps, goiter, pain and significant neck swelling.  RESPIRATORY:  Negative for cough, wheezing or shortness of breath.  CARDIOVASCULAR:  Negative for chest pain, leg swelling or palpitations. HTN  GI:  Negative for abdominal discomfort, blood in stools or black stools or change in bowel habits. GERD  MUSCULOSKELETAL:  See HPI.  SKIN:  Negative for lesions, rash, and itching.  PSYCH:  Negative for sleep disturbance, mood disorder and recent psychosocial stressors.  HEMATOLOGY/LYMPHOLOGY:  Negative for prolonged bleeding, bruising easily or swollen nodes.  NEURO:   No history of headaches, syncope,  paralysis, seizures or tremors.  ENDO: Diabetes  All other reviewed and negative other than HPI.    Past Medical History:  Past Medical History:   Diagnosis Date    Acute right MCA stroke 01/04/2024    Allergy     Anemia     Asthma     Bilateral shoulder bursitis     Cervical stenosis of spine     Chronic pain     DDD (degenerative disc disease), cervical     Depression 01/28/2019    Diabetes mellitus type II     DJD (degenerative joint disease) of knee 06/19/2014    Facet arthritis of lumbar region 12/17/2015    Facet syndrome 12/17/2015    GERD (gastroesophageal reflux disease)     Heartburn     Hyperlipidemia     Hypertension     Morbid obesity     Neuromuscular disorder     PADDY (obstructive sleep apnea)     Paroxysmal atrial fibrillation 03/19/2024    Proteinuria     Right carpal tunnel syndrome     Sacroiliitis 06/13/2018    Sacroiliitis     Sleep apnea     Uterine fibroid        Past Surgical History:  Past Surgical History:   Procedure Laterality Date    CARPAL TUNNEL RELEASE      CARPAL TUNNEL RELEASE  1980s    left    CARPAL TUNNEL RELEASE  2012    right    CATARACT EXTRACTION W/  INTRAOCULAR LENS IMPLANT Left 08/08/2023    DR. STOKES    CATARACT EXTRACTION W/  INTRAOCULAR LENS IMPLANT Right 08/29/2023        COLONOSCOPY N/A 06/15/2020    Procedure: COLONOSCOPY;  Surgeon: Jc Koo MD;  Location: [facility] (4TH FLR);  Service: Endoscopy;  Laterality: N/A;  COVID screening on 6/13/20 at [facility]  pt updated on drop off location and no visitor [facility]-    EPIDURAL STEROID INJECTION N/A 07/24/2023    Procedure: INJECTION, STEROID, EPIDURAL, L5/S1 SOONER DATE;  Surgeon: Israel Hurtado MD;  [facility];  Service: Pain Management;  Laterality: N/A;    ESOPHAGOGASTRODUODENOSCOPY N/A 03/27/2019    Procedure: EGD (ESOPHAGOGASTRODUODENOSCOPY);  Surgeon: Robbin Bar MD;  Location: [facility] (2ND FLR);  Service: Endoscopy;  Laterality: N/A;  BMI 61.3/2nd floor case/svn     INJECTION OF JOINT Bilateral 06/09/2020    Procedure: INJECTION, JOINT, BILATERAL SI;  Surgeon: Lina Wagner MD;  Location: [facility] PAIN MGT;  Service: Pain Management;  Laterality: Bilateral;  B/L SI Joint Injection    INJECTION OF JOINT Bilateral 05/11/2021    Procedure: INJECTION, JOINT, SACROILIAC (SI) need consent;  Surgeon: Lina Wagner MD;  Location: [facility]IN MGT;  Service: Pain Management;  Laterality: Bilateral;    INJECTION OF JOINT Bilateral 5/16/2024    Procedure: INJECTION, JOINT BILATERAL SI AND GTB;  Surgeon: Israel Hurtado MD;  Location: [facility]IN MGT;  Service: Pain Management;  Laterality: Bilateral;  [phone]  2 WK F/U BENJI    JOINT REPLACEMENT Bilateral     with 2 revisions on rt    KNEE SURGERY  03/2010    orthroscope    KNEE SURGERY  06/19/2014    left TKR    LAPAROSCOPIC SLEEVE GASTRECTOMY N/A 08/28/2019    Procedure: GASTRECTOMY, SLEEVE, LAPAROSCOPIC with intraop EGD;  Surgeon: Heriberto Clements MD;  Location: [facility] OR 2ND FLR;  Service: General;  Laterality: N/A;    PANNICULECTOMY N/A 06/14/2022    Procedure: PANNICULECTOMY;  Surgeon: Rhett Gray MD;  Location: [facility] OR 2ND FLR;  Service: Plastics;  Laterality: N/A;    RADIOFREQUENCY ABLATION Right 07/09/2019    Procedure: RADIOFREQUENCY ABLATION;  Surgeon: Lina Wagner MD;  Location: [facility]IN MGT;  Service: Pain Management;  Laterality: Right;  RIGHT RFA L2,3,4,5  2 of 2    RADIOFREQUENCY ABLATION Left 12/19/2019    Procedure: RADIOFREQUENCY ABLATION, LEFT L2-L3-L4-L5 MEDIAL BRANCH 1 OF 2;  Surgeon: Lina Wagner MD;  Location: [facility] PAIN MGT;  Service: Pain Management;  Laterality: Left;    RADIOFREQUENCY ABLATION Right 12/31/2019    Procedure: RADIOFREQUENCY ABLATION, RIGHT L2-L3-L4-L5  2 OF 2;  Surgeon: Lina Wagner MD;  Location: [facility] PAIN MGT;  Service: Pain Management;  Laterality: Right;    RADIOFREQUENCY ABLATION Right 10/13/2020    Procedure: RADIOFREQUENCY ABLATION, Genicular Cooled 1  of 2;  Surgeon: Lina Wagner MD;  Location: [facility] PAIN MGT;  Service: Pain Management;  Laterality: Right;    RADIOFREQUENCY ABLATION Left 11/03/2020    Procedure: RADIOFREQUENCY ABLATION, GENICULAR COOLED  2 OF 2;  Surgeon: Lina Wagner MD;  Location: [facility] PAIN MGT;  Service: Pain Management;  Laterality: Left;    RADIOFREQUENCY ABLATION Left 12/15/2020    Procedure: RADIOFREQUENCY ABLATION LEFT L2,3,4,5 1 of 2;  Surgeon: Lina Wagner MD;  Location: [facility] PAIN MGT;  Service: Pain Management;  Laterality: Left;    RADIOFREQUENCY ABLATION Right 12/29/2020    Procedure: RADIOFREQUENCY ABLATION RIGHT L2,3,4,5 2 of 2;  Surgeon: Lina Wagner MD;  Location: [facility] PAIN MGT;  Service: Pain Management;  Laterality: Right;    RADIOFREQUENCY ABLATION Left 06/29/2021    Procedure: RADIOFREQUENCY ABLATION GENICULAR COOLED;  Surgeon: Lina Wagner MD;  Location: [facility] PAIN MGT;  Service: Pain Management;  Laterality: Left;  1 of 2    RADIOFREQUENCY ABLATION Right 07/13/2021    Procedure: RADIOFREQUENCY ABLATION GENICULAR;  Surgeon: Lina Wagner MD;  Location: [facility] PAIN MGT;  Service: Pain Management;  Laterality: Right;  2 of 2    RADIOFREQUENCY ABLATION Right 08/24/2021    Procedure: RADIOFREQUENCY ABLATION, L2-L3-L4-L5 MEDIAL BRANCH 1 OF 2;  Surgeon: Lina Wagner MD;  Location: [facility] PAIN MGT;  Service: Pain Management;  Laterality: Right;    RADIOFREQUENCY ABLATION Left 09/11/2021    Procedure: RADIOFREQUENCY ABLATION, L2-L3-L4-L5 MEDIAL BR ANCH 2 OF 2;  Surgeon: Lina Wagner MD;  Location: [facility] PAIN MGT;  Service: Pain Management;  Laterality: Left;    RADIOFREQUENCY ABLATION Right 03/07/2022    Procedure: RADIOFREQUENCY ABLATION RIGHT L3,4,5 1 of 2, needs consent;  Surgeon: Israel Hurtado MD;  Location: [facility] PAIN MGT;  Service: Pain Management;  Laterality: Right;    RADIOFREQUENCY ABLATION Left 03/21/2022    Procedure: RADIOFREQUENCY ABLATION RIGHT L3,4,5 2 of 2, needs consent;   Surgeon: Israel Hurtado MD;  Location: [facility] PAIN MGT;  Service: Pain Management;  Laterality: Left;    RADIOFREQUENCY ABLATION Right 05/09/2022    Procedure: RADIOFREQUENCY ABLATION RIGHT GENICULAR COOLED 1 of 2;  Surgeon: Israel Hurtado MD;  Location: [facility] PAIN MGT;  Service: Pain Management;  Laterality: Right;    RADIOFREQUENCY ABLATION Left 05/23/2022    Procedure: RADIOFREQUENCY ABLATION LEFT GENICULAR COOLED  2 of 2;  Surgeon: Israel Hurtado MD;  Location: [facility] PAIN MGT;  Service: Pain Management;  Laterality: Left;    RADIOFREQUENCY ABLATION Right 12/12/2022    Procedure: RADIOFREQUENCY ABLATION RIGHT GENICULAR COOLED  ONE OF TWO  NEEDS CONSENT;  Surgeon: Israel Hurtado MD;  Location: [facility]IN [facility]GT;  Service: Pain Management;  Laterality: Right;    RADIOFREQUENCY ABLATION Left 01/09/2023    Procedure: RADIOFREQUENCY ABLATION LEFT GENICULAR COOLED  TWO OF TWO NEEDS CONSENT;  Surgeon: Israel Hurtado MD;  Location: [facility]IN [facility]GT;  Service: Pain Management;  Laterality: Left;    RADIOFREQUENCY ABLATION Right 03/06/2023    Procedure: RADIOFREQUENCY ABLATION RIGHT L3,L4,L5;  Surgeon: Israel Hurtado MD;  Location: [facility] PAIN MGT;  Service: Pain Management;  Laterality: Right;    RADIOFREQUENCY ABLATION Left 05/22/2023    Procedure: RADIOFREQUENCY ABLATION LEFT L3,L4,L5;  Surgeon: Israel Hurtado MD;  Location: [facility] PAIN MGT;  Service: Pain Management;  Laterality: Left;  4/3 LM TO R/S    RADIOFREQUENCY ABLATION Right 11/27/2023    Procedure: RADIOFREQUENCY ABLATION RIGHT L3, 4, 5 1 OF 3;  Surgeon: Israel Hurtado MD;  Location: [facility] PAIN MGT;  Service: Pain Management;  Laterality: Right;    RADIOFREQUENCY ABLATION Right 01/22/2024    Procedure: RADIOFREQUENCY ABLATION RIGHT GENICULAR 3 NERVES 3 OF 3;  Surgeon: Israel Hurtado MD;  Location: [facility] PAIN MGT;  Service: Pain Management;  Laterality: Right;    RADIOFREQUENCY ABLATION Left 02/12/2024    Procedure: RADIOFREQUENCY ABLATION LEFT L3, 4, 5;  Surgeon: Israel Hurtado MD;  Location:  [facility] PAIN MGT;  Service: Pain Management;  Laterality: Left;  [phone]    RADIOFREQUENCY ABLATION OF LUMBAR MEDIAL BRANCH NERVE AT SINGLE LEVEL Left 06/26/2018    Procedure: RADIOFREQUENCY ABLATION, NERVE, MEDIAL BRANCH, LUMBAR, 1 LEVEL;  Surgeon: Lina Wagner MD;  Location: [facility] PAIN MGT;  Service: Pain Management;  Laterality: Left;  Left Cooled RFA @ L2,3,4,5  [phone]  with Sedation    1 of 2    RADIOFREQUENCY ABLATION OF LUMBAR MEDIAL BRANCH NERVE AT SINGLE LEVEL Right 07/10/2018    Procedure: RADIOFREQUENCY ABLATION, NERVE, MEDIAL BRANCH, LUMBAR, 1 LEVEL;  Surgeon: Lina Wagner MD;  Location: [facility] PAIN MGT;  Service: Pain Management;  Laterality: Right;  RIght Cooled RFA @ L2,3,4,5  [phone] with Sedation    2 of 2    REVISION OF KNEE ARTHROPLASTY Right 7/18/2024    Procedure: REVISION, ARTHROPLASTY, KNEE: RIGHT;  Surgeon: Theodore Swan MD;  [address]  Service: Orthopedics;  Laterality: Right;    TRIGGER FINGER RELEASE Right 2017    TRIGGER FINGER RELEASE Left 07/29/2019    Procedure: RELEASE, TRIGGER FINGER, Left Thumb;  Surgeon: Velma Garcia MD;  Location: [facility];  Service: Orthopedics;  Laterality: Left;  Local w/ MAC       Family History:  Family History   Problem Relation Name Age of Onset    Diabetes Mother      Cataracts Mother      Diabetes Father      Cataracts Father      Hypertension Sister      Diabetes Sister      No Known Problems Sister      Cancer Sister          lymphoma     Coronary artery disease Brother      Diabetes Brother      Diabetes Brother      Hypotension Brother      Kidney failure Brother      Hypotension Brother      Amblyopia Neg Hx      Blindness Neg Hx      Glaucoma Neg Hx      Macular degeneration Neg Hx      Retinal detachment Neg Hx      Strabismus Neg Hx      Stroke Neg Hx      Thyroid disease Neg Hx      Anesthesia problems Neg Hx         Social History:  Social History     Socioeconomic History    Marital status:     Tobacco Use    Smoking status: Never     Passive exposure: Never    Smokeless tobacco: Never   Substance and Sexual Activity    Alcohol use: Not Currently     Comment: occasionally     Drug use: No    Sexual activity: Yes     Partners: Female     Birth control/protection: None   Social History Narrative    Disabled. The patient is the youngest of 6 siblings.  Lives with single-sex partner.                  Social Determinants of Health     Financial Resource Strain: Low Risk  (7/19/2024)    Overall Financial Resource Strain (CARDIA)     Difficulty of Paying Living Expenses: Not very hard   Food Insecurity: No Food Insecurity (7/19/2024)    Hunger Vital Sign     Worried About Running Out of Food in the Last Year: Never true     Ran Out of Food in the Last Year: Never true   Transportation Needs: No Transportation Needs (7/19/2024)    TRANSPORTATION NEEDS     Transportation : No   Physical Activity: Inactive (7/19/2024)    Exercise Vital Sign     Days of Exercise per Week: 0 days     Minutes of Exercise per Session: 0 min   Stress: No Stress Concern Present (7/19/2024)    [employer] - Occupational Stress Questionnaire     Feeling of Stress : Not at all   Housing Stability: Low Risk  (7/19/2024)    Housing Stability Vital Sign     Unable to Pay for Housing in the Last Year: No     Homeless in the Last Year: No       OBJECTIVE:      There were no vitals filed for this visit.     VIRTUAL PHYSICAL EXAMINATION:    GENERAL APPEARANCE: Well appearing, in no acute distress.  PSYCH:  Mood and affect appropriate.  SKIN: Skin color, texture, turgor normal, no rashes or lesions to visible areas.  HEAD/FACE:  Normocephalic, atraumatic.  PULM: Bilateral chest rise. No apparent respiratory distress.      Prior PHYSICAL EXAMINATION 8/16/2024:    GENERAL: Well appearing, in no acute distress, alert and oriented x3.   PSYCH:  Mood and affect appropriate.   SKIN: Skin color, texture, turgor  normal, no rashes or lesions. Well-healing right knee scar.   HEAD/FACE:  Normocephalic, atraumatic.   CV: Rate regular.  PULM: No evidence of respiratory difficulty, symmetric chest rise.  BACK: Negative SLR bilaterally, but positive to posterior thigh tightness and pain. There is pain with palpation over midline lumbar spine. Limited ROM with pain on flexion and extension. Mild tenderness over bilateral SIJ. Negative FABERE. Negative FADIR.  EXTREMITIES: No edema.  Mild TTP over bilateral GTB.   MUSCULOSKELETAL: 4/5 strength in right ankle with plantar and dorsiflexion. 4/5 strength in left ankle with plantar and dorsiflexion. 5/5 strength with right knee flexion and extension. 5/5 strength with left knee flexion and extension. 5/5 strength in right EHL, 5/5 strength in left EHL.  NEURO:  Plantar response are downgoing. No clonus. Normal sensation.   GAIT: Antalgic- ambulates with rolling walker    ASSESSMENT: 58 y.o. year old female with low back and knee pain, consistent with the followin. Chronic pain syndrome        2. S/P revision of total knee, right        3. Status post total bilateral knee replacement              PLAN:     - Previous imaging reviewed.     - Continue Acetaminophen 650 mg q8 hours PRN    - Currently on Percocet 10/325 mg QID PRN. Last fill 2024. Dr Hurtado would like her to decrease to Percocet 10/325 3-4 times per day PRN, #105 for the next month. Dr. Hurtado was consulted on the patient and agrees with this plan.      - She would like to get back down to Percocet TID PRN.     - The patient is here today for a refill of current pain medications and they believe these provide effective pain control and improvements in quality of life.  The patient notes no serious side effects, and feels the benefits outweigh the risks.  The patient was reminded of the pain contract that they signed previously as well as the risks and benefits of the medication including possible death.  The  updated [place] Board  Pharmacy prescription monitoring program was reviewed, and the patient has been found to be compliant with current treatment plan. Medication management provided by Dr Hurtado.     - UDS 5/29/2024 reviewed and appropriate.     - Dr. Hurtado was consulted on the patient and agrees with this plan.      - Due for annual visit with MD by 10/2024.    - Continue HEP and PT.    - She would would like aquatic therapy once cleared by ortho    The above plan and management options were discussed at length with patient. Patient is in agreement with the above and verbalized understanding.     Anisa Cole NP  09/13/2024     Epidermal Sutures: 6-0 Fast Absorbing Gut

## 2024-09-17 ENCOUNTER — CLINICAL SUPPORT (OUTPATIENT)
Dept: REHABILITATION | Facility: HOSPITAL | Age: 59
End: 2024-09-17
Payer: MEDICARE

## 2024-09-17 DIAGNOSIS — R26.2 IMPAIRED AMBULATION: Primary | ICD-10-CM

## 2024-09-17 PROCEDURE — 97110 THERAPEUTIC EXERCISES: CPT | Mod: KX,PO

## 2024-09-17 NOTE — PROGRESS NOTES
"[facility]   Physical Therapy Treatment Note / Reassessment     Name: Alivia Marie Cyr  Clinic Number: [id number]    Therapy Diagnosis:   No diagnosis found.        Physician: Seema Weinberg NP    Visit Date: 9/17/2024    Evaluation Date: 8/13/2024  Authorization Period Expiration: 12/31/2024  Plan of Care Expiration: 10/13/2024  Progress Note Due: 10/10/24  Date of Surgery: 7/18/2024  Visit # / Visits authorized: 9/29   FOTO: 2/ 3    Precautions: Standard and Diabetes     Time In: 10:30  Time Out: 11:00  Total Billable Time: 60 minutes      PTA Visit #: 1/5     Subjective     Patient reports: that she is late because she was waiting for an  that later called to come later.  She  reports that she is  compliant with home exercise program.  Response to previous treatment: felt fine   Functional change: ongoing    Pain: 9/10  Location: right knee - anterior shin    Objective      Observation: Pt entered the clinic ambulating with a RW         Treatment   Bold=performed  Alivia received the treatments listed below:      therapeutic exercises to develop strength, endurance, ROM, flexibility, posture, and core stabilization for 25 minutes including:  Heel slides 20 x 2  bridges 2 x 10  Side lying clamshells 2x10x3"  seated toe raises 3x10   standing heel raises 2x10  nustep x 6 min  leg press 20# 2x10  Heel prop instructed for HEP  Ankle pumps 10 x 2 - for HEP    manual therapy techniques: Joint mobilizations and Soft tissue Mobilization were applied to the: R knee for 0 minutes, including:  R patella mobilization   R distal quadriceps soft tissue mobilization  Patella tendon soft tissue mobilization    neuromuscular re-education activities to improve: Balance, Coordination, Kinesthetic, Sense, Proprioception, and Posture for 5 minutes. The following activities were included:  Quad sets 3 x 10 and x 10 minutes with Russian ES to R quad 10 sec on/off with a towel roll under R " "knee  SAQ 2x10  LAQ +1# 3x10  standing weight shifts in walker x2 min    therapeutic activities to improve functional performance for 00  minutes, including:  +step ups on 2" step (consider adding next visit)    gait training to improve functional mobility and safety for 0  minutes, including:      Patient Education and Home Exercises       Education provided:   - written HEP    Written Home Exercises Provided: Yes. Exercises were reviewed and Alivia was able to demonstrate them prior to the end of the session.  Alivia demonstrated good  understanding of the education provided. See Electronic Medical Record under Patient Instructions for exercises provided during therapy sessions    Assessment     Alivia continues with right knee pain.  She was late to her appointment because she was waiting for an  at her home.  She was able to perform and tolerate the exercises listed above.  Continue to progress her R knee ROM, strength and function as tolerated.   Alivia Is progressing well towards her goals.   Patient prognosis is Good.     Patient will continue to benefit from skilled outpatient physical therapy to address the deficits listed in the problem list box on initial evaluation, provide pt/family education and to maximize pt's level of independence in the home and community environment.     Patient's spiritual, cultural and educational needs considered and pt agreeable to plan of care and goals.     Anticipated barriers to physical therapy: compliance    Goals:   Short Term Goals: 4 weeks   Pt will be instructed in an exercise program to address functional deficits related to her L LE Sx  Improve L knee ROM to 0 degrees of extension  Improve L knee flexion to 100 degrees  Pt will report decreased L knee pain to </= 6/10 at worst     Long Term Goals: 16 weeks   Pt will be independent in a HEP to assist in managing their L LE Sx.   Improve L knee flexion to >/= 110 degrees  Pt will be an independent " community ambulator with the least restrictive assistive device.  Pt will report improved functional ability through an improved score on the FOTO knee survey.  Plan     Progress Physical Therapy program to assist Pt with managing her R LE Sx.    Beltran Estes, PT

## 2024-09-18 DIAGNOSIS — G89.4 CHRONIC PAIN SYNDROME: ICD-10-CM

## 2024-09-18 DIAGNOSIS — M25.562 CHRONIC PAIN OF BOTH KNEES: ICD-10-CM

## 2024-09-18 DIAGNOSIS — M17.0 PRIMARY OSTEOARTHRITIS OF BOTH KNEES: ICD-10-CM

## 2024-09-18 DIAGNOSIS — G89.29 CHRONIC PAIN OF BOTH KNEES: ICD-10-CM

## 2024-09-18 DIAGNOSIS — M47.816 SPONDYLOSIS OF LUMBAR REGION WITHOUT MYELOPATHY OR RADICULOPATHY: ICD-10-CM

## 2024-09-18 DIAGNOSIS — M51.36 DDD (DEGENERATIVE DISC DISEASE), LUMBAR: ICD-10-CM

## 2024-09-18 DIAGNOSIS — M25.561 CHRONIC PAIN OF BOTH KNEES: ICD-10-CM

## 2024-09-19 ENCOUNTER — CLINICAL SUPPORT (OUTPATIENT)
Dept: REHABILITATION | Facility: HOSPITAL | Age: 59
End: 2024-09-19
Payer: MEDICARE

## 2024-09-19 DIAGNOSIS — R26.2 IMPAIRED AMBULATION: Primary | ICD-10-CM

## 2024-09-19 PROCEDURE — 97112 NEUROMUSCULAR REEDUCATION: CPT | Mod: KX,PO,CQ

## 2024-09-19 PROCEDURE — 97110 THERAPEUTIC EXERCISES: CPT | Mod: KX,PO,CQ

## 2024-09-19 RX ORDER — OXYCODONE AND ACETAMINOPHEN 10; 325 MG/1; MG/1
1 TABLET ORAL
Qty: 105 TABLET | Refills: 0 | Status: SHIPPED | OUTPATIENT
Start: 2024-09-23

## 2024-09-20 ENCOUNTER — OFFICE VISIT (OUTPATIENT)
Dept: PODIATRY | Facility: CLINIC | Age: 59
End: 2024-09-20
Payer: MEDICARE

## 2024-09-20 VITALS
WEIGHT: 253.5 LBS | HEIGHT: 65 IN | SYSTOLIC BLOOD PRESSURE: 127 MMHG | BODY MASS INDEX: 42.24 KG/M2 | DIASTOLIC BLOOD PRESSURE: 79 MMHG | HEART RATE: 62 BPM

## 2024-09-20 DIAGNOSIS — E11.49 TYPE II DIABETES MELLITUS WITH NEUROLOGICAL MANIFESTATIONS: Primary | Chronic | ICD-10-CM

## 2024-09-20 DIAGNOSIS — M79.671 BILATERAL FOOT PAIN: Chronic | ICD-10-CM

## 2024-09-20 DIAGNOSIS — L84 CORNS AND CALLUS: Chronic | ICD-10-CM

## 2024-09-20 DIAGNOSIS — B35.1 ONYCHOMYCOSIS OF TOENAIL: Chronic | ICD-10-CM

## 2024-09-20 DIAGNOSIS — M79.672 BILATERAL FOOT PAIN: Chronic | ICD-10-CM

## 2024-09-20 PROCEDURE — 99213 OFFICE O/P EST LOW 20 MIN: CPT | Mod: PBBFAC,PN | Performed by: PODIATRIST

## 2024-09-20 PROCEDURE — 99999 PR PBB SHADOW E&M-EST. PATIENT-LVL III: CPT | Mod: PBBFAC,,, | Performed by: PODIATRIST

## 2024-09-20 RX ORDER — LIDOCAINE AND PRILOCAINE 25; 25 MG/G; MG/G
CREAM TOPICAL
Qty: 30 G | Refills: 2 | Status: SHIPPED | OUTPATIENT
Start: 2024-09-20

## 2024-09-20 RX ORDER — INDOMETHACIN 50 MG/1
CAPSULE ORAL
COMMUNITY
End: 2024-09-20 | Stop reason: SDUPTHER

## 2024-09-20 RX ORDER — INDOMETHACIN 50 MG/1
50 CAPSULE ORAL 2 TIMES DAILY PRN
Qty: 30 CAPSULE | Refills: 0 | Status: SHIPPED | OUTPATIENT
Start: 2024-09-20

## 2024-09-20 RX ORDER — CELECOXIB 200 MG/1
CAPSULE ORAL
COMMUNITY

## 2024-09-20 RX ORDER — METOPROLOL SUCCINATE 25 MG/1
TABLET, EXTENDED RELEASE ORAL
COMMUNITY

## 2024-09-20 NOTE — PROGRESS NOTES
"[facility]   Physical Therapy Treatment Note      Name: Alivia Marie Cyr  Clinic Number: [id number]    Therapy Diagnosis:   Encounter Diagnosis   Name Primary?    Impaired ambulation Yes             Physician: Seema Weinberg NP    Visit Date: 9/23/2024    Evaluation Date: 8/13/2024  Authorization Period Expiration: 12/31/2024  Plan of Care Expiration: 10/13/2024  Progress Note Due: 10/10/24  Date of Surgery: 7/18/2024  Visit # / Visits authorized: 11/29   FOTO: 2/ 3    Precautions: Standard and Diabetes     Time In: 8:22 am (late arrival)  Time Out: 9:10 am  Total Billable Time: 23 minutes      PTA Visit #: 2/5     Subjective     Patient reports: she is feeling weak this morning  She  reports that she is  compliant with home exercise program.  Response to previous treatment: felt fine   Functional change: ongoing    Pain: 9/10  Location: right knee - anterior shin    Objective      Observation: Pt entered the clinic ambulating with a RW         Treatment   Bold=performed  Alivia received the treatments listed below:      therapeutic exercises to develop strength, endurance, ROM, flexibility, posture, and core stabilization for 38 minutes including:  Heel slides 20 x 2  bridges 2 x 10  Side lying clamshells 2x10x3"  seated toe raises 3x10   standing heel raises 2x10  nustep x 7 min  leg press 30# 2x10  Heel prop instructed for HEP  Ankle pumps 10 x 2 - for HEP    manual therapy techniques: Joint mobilizations and Soft tissue Mobilization were applied to the: R knee for 0 minutes, including:  R patella mobilization   R distal quadriceps soft tissue mobilization  Patella tendon soft tissue mobilization    neuromuscular re-education activities to improve: Balance, Coordination, Kinesthetic, Sense, Proprioception, and Posture for 15 minutes. The following activities were included:  Quad sets 3 x 10 and x 10 minutes with Russian ES to R quad 10 sec on/off with a towel roll under R knee  SAQ " "2x10  LAQ +1# 3x10  standing weight shifts in walker x2 min    therapeutic activities to improve functional performance for 00  minutes, including:  +step ups on 2" step (consider adding next visit)    gait training to improve functional mobility and safety for 0  minutes, including:      Patient Education and Home Exercises       Education provided:   - written HEP    Written Home Exercises Provided: Yes. Exercises were reviewed and Alivia was able to demonstrate them prior to the end of the session.  Alivia demonstrated good  understanding of the education provided. See Electronic Medical Record under Patient Instructions for exercises provided during therapy sessions    Assessment     Alivia presents to treatment ambulating with RW. She reports feeling weak this morning and asked for her blood pressure to be taken. It was 96/74 in her left arm. She reports having low blood pressure and she did report taking her medication this morning. She was agreeable to PT session. She was able to tolerate all exercises without issue and reported feeling better following treatment session. Will continue to progress as tolerated.        Alivia Is progressing well towards her goals.   Patient prognosis is Good.     Patient will continue to benefit from skilled outpatient physical therapy to address the deficits listed in the problem list box on initial evaluation, provide pt/family education and to maximize pt's level of independence in the home and community environment.     Patient's spiritual, cultural and educational needs considered and pt agreeable to plan of care and goals.     Anticipated barriers to physical therapy: compliance    Goals:   Short Term Goals: 4 weeks   Pt will be instructed in an exercise program to address functional deficits related to her L LE Sx  Improve L knee ROM to 0 degrees of extension  Improve L knee flexion to 100 degrees  Pt will report decreased L knee pain to </= 6/10 at worst     Long Term " Goals: 16 weeks   Pt will be independent in a HEP to assist in managing their L LE Sx.   Improve L knee flexion to >/= 110 degrees  Pt will be an independent community ambulator with the least restrictive assistive device.  Pt will report improved functional ability through an improved score on the FOTO knee survey.  Plan     Progress Physical Therapy program to assist Pt with managing her R LE Sx.    Richelle Mendez, PTA

## 2024-09-20 NOTE — PROGRESS NOTES
Chief Complaint   Patient presents with    Diabetic Foot Exam     Foot Exam/PCP Clarisse Richardson Md  08/19/24           HPI:   The patient is a 58 y.o.  female  who presents for a diabetic foot exam/care   Patient reports + presence of abnormal sensation to the feet, chronic condition.   Patient has no history of wounds on the feet.   Hx of foot surgery: none.     She has history of gout, relieved with indomethacin.  This patient has documented high risk feet requiring routine maintenance secondary to diabetes.        Primary care doctor is: Clarisse Richardson MD  Patient last saw primary care doctor on:  Diabetic Foot Exam (Foot Exam/PCP Clarisse Richardson Md  08/19/24)        Patient Active Problem List   Diagnosis    PADDY (obstructive sleep apnea)    Type 2 diabetes mellitus, without long-term current use of insulin    Hyperlipemia    Proteinuria    Bilateral knee pain    DJD (degenerative joint disease) of knee    Debility    Prosthetic joint implant failure    Failed total right knee replacement    Right knee pain    History of total left knee replacement    Lumbago    Chronic, continuous use of opioids    Mild intermittent asthma without complication    Allergic reaction to food    DDD (degenerative disc disease), cervical    Cervical radiculopathy    Cervical spondylosis    Bilateral shoulder bursitis    Cervical stenosis of spinal canal    DDD (degenerative disc disease), thoracic    Thoracic spondylosis    Spondylolisthesis at L4-L5 level    Lumbar spondylosis    Spondylolisthesis of cervical region    Cervical spine instability    Myeloradiculopathy    Neural foraminal stenosis of cervical spine    DDD (degenerative disc disease), lumbar    Idiopathic scoliosis of thoracolumbar spine    Bilateral shoulder region arthritis    Trochanteric bursitis of both hips    Chronic pain    Anxiety    GERD (gastroesophageal reflux disease)    Sacroiliac joint pain    Spondylosis without myelopathy     Subacromial bursitis of left shoulder joint    Sacroiliitis    PADDY non-compliant with CPAP    BMI 40.0-44.9, adult    Gout    History of sleeve gastrectomy    History of total right knee replacement    Noncompliance with CPAP treatment    Osteoarthritis of lumbar spine    Aortic atherosclerosis    Uterine fibroid    Impaired range of motion of both knees    Decreased strength of lower extremity    Impaired functional mobility, balance, gait, and endurance    Paroxysmal atrial fibrillation    History of right MCA stroke    Osteoarthritis of multiple joints    Diabetic polyneuropathy associated with type 2 diabetes mellitus    Allergies    History of COVID-19    History of MRSA infection    Postprocedural hypotension    Hypotension    Impaired ambulation           Current Outpatient Medications on File Prior to Visit   Medication Sig Dispense Refill    acetaminophen (TYLENOL) 650 MG TbSR Take 1 tablet (650 mg total) by mouth every 8 (eight) hours. 90 tablet 0    albuterol (VENTOLIN HFA) 90 mcg/actuation inhaler INHALE TWO PUFFS INTO LUNGS EVERY 4 TO 6 HOURS AS NEEDED FOR SHORTNESS OF BREATH AND FOR WHEEZING 18 g 1    allopurinoL (ZYLOPRIM) 300 MG tablet Take 1 tablet (300 mg total) by mouth 2 (two) times daily. 180 tablet 3    apixaban (ELIQUIS) 5 mg Tab Take 1 tablet (5 mg total) by mouth 2 (two) times daily. 60 tablet 6    atorvastatin (LIPITOR) 80 MG tablet Take 1 tablet (80 mg total) by mouth once daily. 90 tablet 3    azithromycin (Z-MICKI) 250 MG tablet Two today then one daily thereafter 6 tablet 0    b complex vitamins tablet Take 1 tablet by mouth every other day.       blood pressure monitor (BLOOD PRESSURE KIT) Kit 1 kit by Misc.(Non-Drug; Combo Route) route Daily. Check blood pressure daily 1 each 0    calcium citrate/vitamin D2 (ISIAH-CITRATE ORAL) Take 500 mg by mouth once daily.      celecoxib (CELEBREX) 200 MG capsule 1 tab daily as needed      colchicine (MITIGARE) 0.6 mg Cap One daily as needed for gout  flare (Patient taking differently: One daily) 90 capsule 1    cyanocobalamin (VITAMIN B-12) 1000 MCG tablet Take 100 mcg by mouth every morning.      diclofenac sodium (VOLTAREN) 1 % Gel Apply 2 g topically 4 (four) times daily as needed. To painful area on the feet. 500 g 5    fluticasone propionate (FLONASE) 50 mcg/actuation nasal spray 1 SPRAY BY EACH NOSTRIL ROUTE 2 TIMES DAILY AS NEEDED FOR RHINITIS. 48 g 3    ipratropium (ATROVENT) 21 mcg (0.03 %) nasal spray Use 1 spray by Each Nostril route 2 (two) times daily for 7 days 30 mL 0    LIDOcaine (XYLOCAINE) 5 % Oint ointment APPLY TOPICALLY AS NEEDED. 35.44 g 6    metoprolol succinate (TOPROL-XL) 25 MG 24 hr tablet TAKE 1/2 TABLET (12.5 MG TOTAL) BY MOUTH ONCE DAILY.      midodrine (PROAMATINE) 5 MG Tab One daily      MULTIVIT-IRON-MIN-FOLIC ACID 3,500-18-0.4 UNIT-MG-MG ORAL CHEW Take 1 tablet by mouth 2 (two) times daily.       nystatin (MYCOSTATIN) powder Apply topically 2 (two) times daily. 60 g 3    oxybutynin (DITROPAN-XL) 5 MG TR24 Take 1 tablet (5 mg total) by mouth once daily. 90 tablet 3    [START ON 9/23/2024] oxyCODONE-acetaminophen (PERCOCET)  mg per tablet Take 1 tablet by mouth every 6 to 8 hours as needed for Pain (for severe pain). 105 tablet 0    prednisoLONE acetate (PRED FORTE) 1 % DrpS Place 1 drop into the right eye 3 (three) times daily.      semaglutide (OZEMPIC) 1 mg/dose (4 mg/3 mL) Inject 1 mg into the skin every 7 days. 3 mL 11    senna-docusate 8.6-50 mg (SENNA WITH DOCUSATE SODIUM) 8.6-50 mg per tablet Take 1 tablet by mouth once daily. 30 tablet 0    tiZANidine (ZANAFLEX) 4 MG tablet TAKE 1 TABLET (4 MG TOTAL) BY MOUTH EVERY 8 (EIGHT) HOURS AS NEEDED (MUSCLE PAIN). 90 tablet 2    urea (CARMOL) 40 % Crea Apply topically once daily. To dry skin on the feet. 120 g 5    vitamin D 185 MG Tab Take 5,000 mg by mouth every morning.       [DISCONTINUED] indomethacin (INDOCIN) 50 MG capsule TAKE 1 CAPSULE (50 MG TOTAL) BY MOUTH 2 (TWO)  TIMES DAILY AS NEEDED (WITH MEALS).      FLUoxetine 40 MG capsule Take 1 capsule (40 mg total) by mouth once daily. 90 capsule 3     Current Facility-Administered Medications on File Prior to Visit   Medication Dose Route Frequency Provider Last Rate Last Admin    0.9%  NaCl infusion   Intravenous Continuous Lina Wagner MD 25 mL/hr at 12/15/20 1506 25 mL/hr at 12/15/20 1506    0.9%  NaCl infusion   Intravenous Continuous Ciro Chung MD        0.9%  NaCl infusion   Intravenous Continuous Santos Barron MD           Review of patient's allergies indicates:  No Known Allergies      ROS:  General ROS: negative  Respiratory ROS: no cough, shortness of breath, or wheezing  Cardiovascular ROS: no chest pain or dyspnea on exertion  Musculoskeletal ROS: negative  Neurological ROS:   negative for - gait disturbance, + numbness/tingling, seizures or tremors  Dermatological ROS: + nail changes, dry skin      LAST HbA1c:   Hemoglobin A1C   Date Value Ref Range Status   07/01/2024 6.9 (H) 4.0 - 5.6 % Final     Comment:     ADA Screening Guidelines:  5.7-6.4%  Consistent with prediabetes  >or=6.5%  Consistent with diabetes    High levels of fetal hemoglobin interfere with the HbA1C  assay. Heterozygous hemoglobin variants (HbS, HgC, etc)do  not significantly interfere with this assay.   However, presence of multiple variants may affect accuracy.     01/05/2024 6.2 (H) 4.0 - 5.6 % Final     Comment:     ADA Screening Guidelines:  5.7-6.4%  Consistent with prediabetes  >or=6.5%  Consistent with diabetes    High levels of fetal hemoglobin interfere with the HbA1C  assay. Heterozygous hemoglobin variants (HbS, HgC, etc)do  not significantly interfere with this assay.   However, presence of multiple variants may affect accuracy.     09/18/2023 6.3 (H) 4.0 - 5.6 % Final     Comment:     ADA Screening Guidelines:  5.7-6.4%  Consistent with prediabetes  >or=6.5%  Consistent with diabetes    High levels of fetal hemoglobin  "interfere with the HbA1C  assay. Heterozygous hemoglobin variants (HbS, HgC, etc)do  not significantly interfere with this assay.   However, presence of multiple variants may affect accuracy.           EXAM:   Vitals:    09/20/24 0817   BP: 127/79   Pulse: 62   Weight: 115 kg (253 lb 8.5 oz)   Height: 5' 5" (1.651 m)         General: Pt. is well-developed, well-nourished, appears stated age, in no acute distress, alert and oriented x 3.      Vascular: Dorsalis pedis and posterior tibial pulses are 2/4 bilaterally. 3 secs capillary refill time and toes are warm to touch.    There is reduced hair growth on the feet.    There is 1+ edema.  no varicosities.        Neurological:  Light touch, proprioception, and sharp/dull sensation are all intact bilaterally. Protective threshold with the [place] monofilament is diminished bilaterally.   +paresthesias      Dermatological:  The skin is thin    The toenails  1-5 L and 1-5 R  are greenish- yellow, long by 5-6mm and thickened by 2-3mm with subungual debris  .   There are no open wounds. There is no ecchymoses.  no erythema noted.   Skin diffusely dry and xerotic on the soles, worse on the left.   Multiple nucleated     Interdigital spaces are intact, no fissures    Skin atrophic, thin, dry and mildly hyperpigmented.         Musculoskeletal:    Muscle strength is 5/5 in all groups bilaterally.    Metatarsophalangeal, subtalar, and ankle range of motion are intact without crepitus.     Ankle equinus bilateral           Assessment / Plan:    Problem List Items Addressed This Visit    None  Visit Diagnoses       Type II diabetes mellitus with neurological manifestations  (Chronic)   -  Primary    Relevant Medications    indomethacin (INDOCIN) 50 MG capsule    LIDOcaine-prilocaine (EMLA) cream    Other Relevant Orders    Routine Foot Care    Onychomycosis of toenail  (Chronic)       Relevant Orders    Routine Foot Care    Corns and callus  (Chronic)       Relevant " "Orders    Routine Foot Care    Bilateral foot pain  (Chronic)       Relevant Medications    indomethacin (INDOCIN) 50 MG capsule    LIDOcaine-prilocaine (EMLA) cream            I counseled the patient on the patient's conditions, their implications and medical management.  Shoe inspection.   Continue good nutrition and blood sugar control to help prevent podiatric complications of diabetes.   Maintain proper foot hygiene.   Continue wearing proper shoe gear, daily foot inspections, never walking without protective shoe gear, never putting sharp instruments to feet.  Shoe and activity modification as needed for relief.   Continue urea cream daily.   Prescription Meds as ordered      Routine Foot Care    Date/Time: 9/20/2024 8:00 AM    Performed by: Stacey Rowland DPM  Authorized by: Stacey Rowland DPM    Time out: Immediately prior to procedure a "time out" was called to verify the correct patient, procedure, equipment, support staff and site/side marked as required.    Consent Done?:  Yes (Verbal)  Hyperkeratotic Skin Lesions?: Yes    Number of trimmed lesions:  2  Location(s):  Left Plantar and Right Plantar    Nail Care Type:  Debride  Location(s): All  (Left 1st Toe, Left 3rd Toe, Left 2nd Toe, Left 4th Toe, Left 5th Toe, Right 1st Toe, Right 2nd Toe, Right 3rd Toe, Right 4th Toe and Right 5th Toe)  Patient tolerance:  Patient tolerated the procedure well with no immediate complications     With patient's permission, the toenails mentioned above were reduced and debrided using a nail nipper, removing offending nail and debris.   Utilizing a #15 scalpel, I trimmed the corns and calluses at the above mentioned location.      The patient will continue to monitor the areas daily, inspect the feet, wear protective shoe gear when ambulatory, and moisturizer to maintain skin integrity.     "

## 2024-09-23 ENCOUNTER — CLINICAL SUPPORT (OUTPATIENT)
Dept: REHABILITATION | Facility: HOSPITAL | Age: 59
End: 2024-09-23
Payer: MEDICARE

## 2024-09-23 DIAGNOSIS — J45.20 ASTHMA, WELL CONTROLLED, MILD INTERMITTENT: ICD-10-CM

## 2024-09-23 DIAGNOSIS — R26.2 IMPAIRED AMBULATION: Primary | ICD-10-CM

## 2024-09-23 PROCEDURE — 97110 THERAPEUTIC EXERCISES: CPT | Mod: KX,PO,CQ

## 2024-09-23 PROCEDURE — 97112 NEUROMUSCULAR REEDUCATION: CPT | Mod: KX,PO,CQ

## 2024-09-23 NOTE — TELEPHONE ENCOUNTER
No care due was identified.  [facility] Embedded Care Due Messages. Reference number: [id number].   9/23/2024 4:35:21 PM CDT

## 2024-09-24 RX ORDER — ALBUTEROL SULFATE 90 UG/1
INHALANT RESPIRATORY (INHALATION)
Qty: 54 G | Refills: 3 | Status: SHIPPED | OUTPATIENT
Start: 2024-09-24

## 2024-09-24 NOTE — TELEPHONE ENCOUNTER
Refill Routing Note   Medication(s) are not appropriate for processing by [facility] for the following reason(s):        New or recently adjusted medication    [facility] action(s):  Defer               Appointments  past 12m or future 3m with PCP    Date Provider   Last Visit   8/19/2024 Clarisse Richardson MD   Next Visit   Visit date not found Clarisse Richardson MD   ED visits in past 90 days: 0        Note composed:10:30 AM 09/24/2024

## 2024-09-25 ENCOUNTER — CLINICAL SUPPORT (OUTPATIENT)
Dept: REHABILITATION | Facility: HOSPITAL | Age: 59
End: 2024-09-25
Payer: MEDICARE

## 2024-09-25 DIAGNOSIS — R26.2 IMPAIRED AMBULATION: Primary | ICD-10-CM

## 2024-09-25 PROCEDURE — 97112 NEUROMUSCULAR REEDUCATION: CPT | Mod: PO

## 2024-09-25 PROCEDURE — 97110 THERAPEUTIC EXERCISES: CPT | Mod: PO

## 2024-09-25 NOTE — PROGRESS NOTES
"[facility]   Physical Therapy Treatment Note      Name: Alivia Marie Cyr  Clinic Number: [id number]    Therapy Diagnosis:   Encounter Diagnosis   Name Primary?    Impaired ambulation Yes       Physician: Seema Weinberg NP    Visit Date: 9/25/2024    Evaluation Date: 8/13/2024  Authorization Period Expiration: 12/31/2024  Plan of Care Expiration: 10/13/2024  Progress Note Due: 10/10/24  Date of Surgery: 7/18/2024  Visit # / Visits authorized: 12/29   FOTO: 2/ 3    Precautions: Standard and Diabetes     Time In: 9:20 am   Time Out: 10:00 am  Total Billable Time: 40 minutes      PTA Visit #: 2/5     Subjective     Patient reports: that her knee continues to hurt.  She has trouble finding a comfortable position to sleep.  She  reports that she is  compliant with home exercise program.  Response to previous treatment: felt fine   Functional change: ongoing    Pain: 9/10  Location: right knee - anterior shin    Objective      Observation: Pt entered the clinic ambulating with a RW         Treatment   Bold=performed  Alivia received the treatments listed below:      therapeutic exercises to develop strength, endurance, ROM, flexibility, posture, and core stabilization for 325 minutes including:  Heel slides 20 x 2  bridges 2 x 10  Side lying clamshells 2x10x3"  seated toe raises 3x10   standing heel raises 2x10  nustep x 7 min  leg press 30# 2x10  Heel prop instructed for HEP  Ankle pumps 10 x 2 - for HEP    manual therapy techniques: Joint mobilizations and Soft tissue Mobilization were applied to the: R knee for 0 minutes, including:  R patella mobilization   R distal quadriceps soft tissue mobilization  Patella tendon soft tissue mobilization    neuromuscular re-education activities to improve: Balance, Coordination, Kinesthetic, Sense, Proprioception, and Posture for 15 minutes. The following activities were included:  Quad sets 3 x 10 and x 10 minutes with Russian ES to R quad 10 sec " "on/off with a towel roll under R knee  SAQ 2x10 - with assist  LAQ +1# 3x10  standing weight shifts in walker x2 min    therapeutic activities to improve functional performance for 00  minutes, including:  +step ups on 2" step (consider adding next visit)    gait training to improve functional mobility and safety for 0  minutes, including:      Patient Education and Home Exercises       Education provided:   - written HEP    Written Home Exercises Provided: Yes. Exercises were reviewed and Alivia was able to demonstrate them prior to the end of the session.  Alivia demonstrated good  understanding of the education provided. See Electronic Medical Record under Patient Instructions for exercises provided during therapy sessions    Assessment     Alivia presents to treatment 20 minutes late ambulating with RW. She reports that her knee is sore all the time, but she will try to do her exercises. Pt tolerated all of her Tx activities with some level of knee pain, but reported feeling better after her session today.  Will continue to progress as tolerated.        Alivia Is progressing well towards her goals.   Patient prognosis is Good.     Patient will continue to benefit from skilled outpatient physical therapy to address the deficits listed in the problem list box on initial evaluation, provide pt/family education and to maximize pt's level of independence in the home and community environment.     Patient's spiritual, cultural and educational needs considered and pt agreeable to plan of care and goals.     Anticipated barriers to physical therapy: compliance    Goals:   Short Term Goals: 4 weeks   Pt will be instructed in an exercise program to address functional deficits related to her L LE Sx  Improve L knee ROM to 0 degrees of extension  Improve L knee flexion to 100 degrees  Pt will report decreased L knee pain to </= 6/10 at worst     Long Term Goals: 16 weeks   Pt will be independent in a HEP to assist in managing " their L LE Sx.   Improve L knee flexion to >/= 110 degrees  Pt will be an independent community ambulator with the least restrictive assistive device.  Pt will report improved functional ability through an improved score on the FOTO knee survey.  Plan     Progress Physical Therapy program to assist Pt with managing her R LE Sx.    Beltran Estes, PT

## 2024-09-30 NOTE — PROGRESS NOTES
"[facility]   Physical Therapy Treatment Note      Name: Alivia Marie Cyr  Clinic Number: [id number]    Therapy Diagnosis:   Encounter Diagnosis   Name Primary?    Impaired ambulation Yes         Physician: Seema Weinberg NP    Visit Date: 10/1/2024    Evaluation Date: 8/13/2024  Authorization Period Expiration: 12/31/2024  Plan of Care Expiration: 10/13/2024  Progress Note Due: 10/10/24  Date of Surgery: 7/18/2024  Visit # / Visits authorized: 13/29   FOTO: 2/ 3    Precautions: Standard and Diabetes     Time In: 9:17 (late arrival)  Time Out: 10:00 am  Total Billable Time: 43 minutes      PTA Visit #: 1/5     Subjective     Patient reports: she wants to build up her quad muscle  She  reports that she is  compliant with home exercise program.  Response to previous treatment: felt fine   Functional change: ongoing    Pain: 9/10  Location: right knee - anterior shin    Objective      Observation: Pt entered the clinic ambulating with a RW         Treatment   Bold=performed  Alivia received the treatments listed below:      therapeutic exercises to develop strength, endurance, ROM, flexibility, posture, and core stabilization for 20 minutes including:  Heel slides 20 x 2  bridges 2 x 10  Side lying clamshells 2x10x3"  seated toe raises 3x10   standing heel raises 2x10  nustep x 7 min    Heel prop instructed for HEP  Ankle pumps 10 x 2 - for HEP    manual therapy techniques: Joint mobilizations and Soft tissue Mobilization were applied to the: R knee for 0 minutes, including:  R patella mobilization   R distal quadriceps soft tissue mobilization  Patella tendon soft tissue mobilization    neuromuscular re-education activities to improve: Balance, Coordination, Kinesthetic, Sense, Proprioception, and Posture for 13 minutes. The following activities were included:  Quad sets 3 x 10 and x 10 minutes with [place] ES to R quad 10 sec on/off with a towel roll under R knee  Quad sets 20x5"  SAQ " "2x10 2#  LAQ 2# 2x10  standing weight shifts in walker x2 min    therapeutic activities to improve functional performance for 10  minutes, including:  +step ups on 4" step 2x10  leg press 40# 2x10  +single leg press 20# 2x10    gait training to improve functional mobility and safety for 0  minutes, including:      Patient Education and Home Exercises       Education provided:   - written HEP    Written Home Exercises Provided: Yes. Exercises were reviewed and Alivia was able to demonstrate them prior to the end of the session.  Alivia demonstrated good  understanding of the education provided. See Electronic Medical Record under Patient Instructions for exercises provided during therapy sessions    Assessment     Chinmay presents to treatment ambulating without AD. She reports continued weakness in her right quad and would like to increase exercises to strengthen it more. She tolerated progressions as noted including step ups and single leg press with no increase in pain. Will continue to progress as tolerated.          Alivia Is progressing well towards her goals.   Patient prognosis is Good.     Patient will continue to benefit from skilled outpatient physical therapy to address the deficits listed in the problem list box on initial evaluation, provide pt/family education and to maximize pt's level of independence in the home and community environment.     Patient's spiritual, cultural and educational needs considered and pt agreeable to plan of care and goals.     Anticipated barriers to physical therapy: compliance    Goals:   Short Term Goals: 4 weeks   Pt will be instructed in an exercise program to address functional deficits related to her L LE Sx  Improve L knee ROM to 0 degrees of extension  Improve L knee flexion to 100 degrees  Pt will report decreased L knee pain to </= 6/10 at worst     Long Term Goals: 16 weeks   Pt will be independent in a HEP to assist in managing their L LE Sx.   Improve L knee flexion " to >/= 110 degrees  Pt will be an independent community ambulator with the least restrictive assistive device.  Pt will report improved functional ability through an improved score on the FOTO knee survey.  Plan     Progress Physical Therapy program to assist Pt with managing her R LE Sx.    Richelle Mendez, PTA

## 2024-10-01 ENCOUNTER — PATIENT MESSAGE (OUTPATIENT)
Dept: BARIATRICS | Facility: CLINIC | Age: 59
End: 2024-10-01
Payer: MEDICARE

## 2024-10-01 ENCOUNTER — CLINICAL SUPPORT (OUTPATIENT)
Dept: REHABILITATION | Facility: HOSPITAL | Age: 59
End: 2024-10-01
Payer: MEDICARE

## 2024-10-01 DIAGNOSIS — R26.2 IMPAIRED AMBULATION: Primary | ICD-10-CM

## 2024-10-01 PROCEDURE — 97112 NEUROMUSCULAR REEDUCATION: CPT | Mod: KX,PO,CQ

## 2024-10-01 PROCEDURE — 97110 THERAPEUTIC EXERCISES: CPT | Mod: KX,PO,CQ

## 2024-10-01 PROCEDURE — 97530 THERAPEUTIC ACTIVITIES: CPT | Mod: KX,PO,CQ

## 2024-10-05 DIAGNOSIS — Z96.651 S/P REVISION OF TOTAL KNEE, RIGHT: ICD-10-CM

## 2024-10-07 RX ORDER — DEXTROMETHORPHAN HYDROBROMIDE, GUAIFENESIN 5; 100 MG/5ML; MG/5ML
650 LIQUID ORAL EVERY 8 HOURS PRN
Qty: 90 TABLET | Refills: 0 | Status: SHIPPED | OUTPATIENT
Start: 2024-10-07

## 2024-10-08 ENCOUNTER — PATIENT MESSAGE (OUTPATIENT)
Dept: BARIATRICS | Facility: CLINIC | Age: 59
End: 2024-10-08
Payer: MEDICARE

## 2024-10-08 ENCOUNTER — CLINICAL SUPPORT (OUTPATIENT)
Dept: REHABILITATION | Facility: HOSPITAL | Age: 59
End: 2024-10-08
Payer: MEDICARE

## 2024-10-08 DIAGNOSIS — R26.2 IMPAIRED AMBULATION: Primary | ICD-10-CM

## 2024-10-08 PROCEDURE — 97530 THERAPEUTIC ACTIVITIES: CPT | Mod: PO

## 2024-10-08 PROCEDURE — 97112 NEUROMUSCULAR REEDUCATION: CPT | Mod: PO

## 2024-10-08 NOTE — PROGRESS NOTES
"[facility]   Physical Therapy Treatment Note      Name: Alivia Marie Cyr  Clinic Number: [id number]    Therapy Diagnosis:   Encounter Diagnosis   Name Primary?    Impaired ambulation Yes       Physician: Seema Weinberg NP    Visit Date: 10/8/2024    Evaluation Date: 8/13/2024  Authorization Period Expiration: 12/31/2024  Plan of Care Expiration: 10/13/2024  Progress Note Due: 10/10/24  Date of Surgery: 7/18/2024  Visit # / Visits authorized: 14/29   FOTO: 2/ 3    Precautions: Standard and Diabetes     Time In: 8:05  Time Out: 8:55 am  Total Billable Time: 30 minutes      PTA Visit #: 1/5     Subjective     Patient reports: she likes early appointments  She  reports that she is  compliant with home exercise program.  Response to previous treatment: felt fine   Functional change: ongoing    Pain: 9/10  Location: right knee - anterior shin    Objective      Observation: Pt entered the clinic ambulating with a RW         Treatment   Bold=performed  Alivia received the treatments listed below:      therapeutic exercises to develop strength, endurance, ROM, flexibility, posture, and core stabilization for 20 minutes including:  Heel slides 20 x 2  bridges 2 x 10  Side lying clamshells 2x10x3"  seated toe raises 3x10   standing heel raises 2x10  nustep x 7 min    Heel prop instructed for HEP  Ankle pumps 10 x 2 - for HEP    manual therapy techniques: Joint mobilizations and Soft tissue Mobilization were applied to the: R knee for 0 minutes, including:  R patella mobilization   R distal quadriceps soft tissue mobilization  Patella tendon soft tissue mobilization    neuromuscular re-education activities to improve: Balance, Coordination, Kinesthetic, Sense, Proprioception, and Posture for 13 minutes. The following activities were included:  Quad sets 3 x 10 and x 10 minutes with Russian ES to R quad 10 sec on/off with a towel roll under R knee  Quad sets 20x5"  SAQ 2x10 2#  LAQ 2# 2x10  standing " "weight shifts in walker x2 min    therapeutic activities to improve functional performance for 15  minutes, including:  +step ups on 4" step 2x10  leg press 40# 2x10  +single leg press 20# 2x10    gait training to improve functional mobility and safety for 0  minutes, including:      Patient Education and Home Exercises       Education provided:   - written HEP    Written Home Exercises Provided: Yes. Exercises were reviewed and Alivia was able to demonstrate them prior to the end of the session.  Alivia demonstrated good  understanding of the education provided. See Electronic Medical Record under Patient Instructions for exercises provided during therapy sessions    Assessment     Chinmay presents to treatment ambulating without AD. She all Tx activities well today. Will continue to progress as tolerated.          Alivia Is progressing well towards her goals.   Patient prognosis is Good.     Patient will continue to benefit from skilled outpatient physical therapy to address the deficits listed in the problem list box on initial evaluation, provide pt/family education and to maximize pt's level of independence in the home and community environment.     Patient's spiritual, cultural and educational needs considered and pt agreeable to plan of care and goals.     Anticipated barriers to physical therapy: compliance    Goals:   Short Term Goals: 4 weeks   Pt will be instructed in an exercise program to address functional deficits related to her L LE Sx  Improve L knee ROM to 0 degrees of extension  Improve L knee flexion to 100 degrees  Pt will report decreased L knee pain to </= 6/10 at worst     Long Term Goals: 16 weeks   Pt will be independent in a HEP to assist in managing their L LE Sx.   Improve L knee flexion to >/= 110 degrees  Pt will be an independent community ambulator with the least restrictive assistive device.  Pt will report improved functional ability through an improved score on the FOTO knee " survey.  Plan     Progress Physical Therapy program to assist Pt with managing her R LE Sx.    Beltran Estes, PT

## 2024-10-09 ENCOUNTER — OFFICE VISIT (OUTPATIENT)
Dept: ORTHOPEDICS | Facility: CLINIC | Age: 59
End: 2024-10-09
Payer: MEDICARE

## 2024-10-09 VITALS — WEIGHT: 247.38 LBS | HEIGHT: 65 IN | BODY MASS INDEX: 41.22 KG/M2

## 2024-10-09 DIAGNOSIS — Z96.651 S/P REVISION OF TOTAL KNEE, RIGHT: Primary | ICD-10-CM

## 2024-10-09 PROCEDURE — 99024 POSTOP FOLLOW-UP VISIT: CPT | Mod: POP,,, | Performed by: ORTHOPAEDIC SURGERY

## 2024-10-09 PROCEDURE — 99214 OFFICE O/P EST MOD 30 MIN: CPT | Mod: PBBFAC | Performed by: ORTHOPAEDIC SURGERY

## 2024-10-09 PROCEDURE — 99999 PR PBB SHADOW E&M-EST. PATIENT-LVL IV: CPT | Mod: PBBFAC,,, | Performed by: ORTHOPAEDIC SURGERY

## 2024-10-09 NOTE — PROGRESS NOTES
Alivia Thomas is in for 3 month follow up for a revision  right TKA.  She is doing fairly well.  Mild pain in the knee.  She has been in PT, working on strengthening.  Exam demonstrates  A well developed female in no distress.  Alert and oriented.  Mood and affect are appropriate.    Knee incision is well healed.  ROM is  (extensor lag).  The patella tracks well and there is no instability. The extremity is neurovascularly intact.    Xrays demonstrate a well fixed and positioned prosthesis.         No data to display                     No data to display                     No data to display                    7/1/2024     1:00 PM   Knee Society and Function Score   FINDINGS - KNEE SOCIETY SCORE 50   FINDINGS - KNEE SOCIETY FUNCTION SCORE 50            No data to display                Imp:Progressing well    Continue PT    F/u in 6 weeks with xrays

## 2024-10-14 DIAGNOSIS — M51.369 DDD (DEGENERATIVE DISC DISEASE), LUMBAR: ICD-10-CM

## 2024-10-14 DIAGNOSIS — M25.562 CHRONIC PAIN OF BOTH KNEES: ICD-10-CM

## 2024-10-14 DIAGNOSIS — G89.4 CHRONIC PAIN SYNDROME: ICD-10-CM

## 2024-10-14 DIAGNOSIS — G89.29 CHRONIC PAIN OF BOTH KNEES: ICD-10-CM

## 2024-10-14 DIAGNOSIS — M17.0 PRIMARY OSTEOARTHRITIS OF BOTH KNEES: ICD-10-CM

## 2024-10-14 DIAGNOSIS — M25.561 CHRONIC PAIN OF BOTH KNEES: ICD-10-CM

## 2024-10-14 DIAGNOSIS — M47.816 SPONDYLOSIS OF LUMBAR REGION WITHOUT MYELOPATHY OR RADICULOPATHY: ICD-10-CM

## 2024-10-14 RX ORDER — OXYCODONE AND ACETAMINOPHEN 10; 325 MG/1; MG/1
1 TABLET ORAL
Qty: 105 TABLET | Refills: 0 | Status: SHIPPED | OUTPATIENT
Start: 2024-10-22

## 2024-10-14 NOTE — TELEPHONE ENCOUNTER
Patient requesting refill on Percocet  Last office visit 9/13/24   shows last refill on 9/23/24  Patient does have a pain contract on file with [facility]  Patient last UDS 5/29/24 was not consistent with current therapy    CODEINE  Not Detected  Not Detected  Not Detected Not Detected    Comment: INTERPRETIVE INFORMATION: Codeine, U  Positive Cutoff: 40 ng/mL  Methodology: Mass Spectrometry   MORPHINE  Not Detected  Not Detected  Not Detected Not Detected    Comment: INTERPRETIVE INFORMATION:Morphine, U  Positive Cutoff: 20 ng/mL  Methodology: Mass Spectrometry   6-ACETYLMORPHINE  Not Detected  Not Detected  Not Detected Not Detected    Comment: INTERPRETIVE INFORMATION:6-acetylmorphine, U  Positive Cutoff: 20 ng/mL  Methodology: Mass Spectrometry   OXYCODONE  Not Detected  Present  Present Present    Comment: INTERPRETIVE INFORMATION:Oxycodone, U  Positive Cutoff: 40 ng/mL  Methodology: Mass Spectrometry   NOROYXCODONE  Present  Present  Present Present    Comment: INTERPRETIVE INFORMATION:Noroxycodone, U  Positive Cutoff: 100 ng/mL  Methodology: Mass Spectrometry   OXYMORPHONE  Not Detected  Present  Present Present    Comment: INTERPRETIVE INFORMATION:Oxymorphone, U  Positive Cutoff: 40 ng/mL  Methodology: Mass Spectrometry   NOROXYMORPHONE  Not Detected  Not Detected  Not Detected Not Detected    Comment: INTERPRETIVE INFORMATION:Noroxymorphone, U  Positive Cutoff: 100 ng/mL  Methodology: Mass Spectrometry   HYDROCODONE  Not Detected  Not Detected  Not Detected Not Detected    Comment: INTERPRETIVE INFORMATION:Hydrocodone, U  Positive Cutoff: 40 ng/mL  Methodology: Mass Spectrometry   NORHYDROCODONE  Not Detected  Not Detected  Not Detected Not Detected    Comment: INTERPRETIVE INFORMATION:Norhydrocodone, U  Positive Cutoff: 100 ng/mL  Methodology: Mass Spectrometry   HYDROMORPHONE  Not Detected  Not Detected  Not Detected Not Detected    Comment: INTERPRETIVE  INFORMATION:Hydromorphone, U  Positive Cutoff: 20 ng/mL  Methodology: Mass Spectrometry   BUPRENORPHINE  Not Detected  Not Detected  Not Detected Not Detected    Comment: INTERPRETIVE INFORMATION:Buprenorphine, U  Positive Cutoff: 5 ng/mL  Methodology: Mass Spectrometry   NORUBPRENORPHINE  Not Detected  Not Detected  Not Detected Not Detected    Comment: INTERPRETIVE INFORMATION:Norbuprenorphine, U  Positive Cutoff: 20 ng/mL  Methodology: Mass Spectrometry   FENTANYL  Not Detected  Not Detected  Not Detected Not Detected    Comment: INTERPRETIVE INFORMATION:Fentanyl, U  Positive Cutoff: 2 ng/mL  Methodology: Mass Spectrometry   NORFENTANYL  Not Detected  Not Detected  Not Detected Not Detected    Comment: INTERPRETIVE INFORMATION:Norfentanyl, U  Positive Cutoff: 2 ng/mL  Methodology: Mass Spectrometry   MEPERIDINE METABOLITE  Not Detected  Not Detected  Not Detected Not Detected    Comment: INTERPRETIVE INFORMATION:Meperidine metabolite, U  Positive Cutoff: 50 ng/mL  Methodology: Mass Spectrometry   TAPENTADOL  Not Detected  Not Detected  Not Detected Not Detected    Comment: INTERPRETIVE INFORMATION:Tapentadol, U  Positive Cutoff: 100 ng/mL  Methodology: Mass Spectrometry   TAPENTADOL-O-SULF  Not Detected  Not Detected  Not Detected Not Detected    Comment: INTERPRETIVE INFORMATION:Tapentadol-o-Sulf, U  Positive Cutoff: 200 ng/mL  Methodology: Mass Spectrometry   METHADONE  Negative  Not Detected  Not Detected Not Detected    Comment: Presumptive negative by immunoassay. Testing by mass  spectrometry is available on request.  INTERPRETIVE INFORMATION: Methadone Screen, U  Positive Cutoff: 150 ng/mL  Methodology: Immunoassay   TRAMADOL  Negative  Not Detected  Not Detected Not Detected    Comment: Presumptive negative by immunoassay. Testing by mass  spectrometry is available on request.  INTERPRETIVE INFORMATION:Tramadol Screen, U  Positive Cutoff: 100 ng/mL  Methodology: Immunoassay   AMPHETAMINE  Not Detected   Not Detected  Not Detected Not Detected    Comment: INTERPRETIVE INFORMATION:Amphetamine, U  Positive Cutoff: 50 ng/mL  Methodology: Mass Spectrometry   METHAMPHETAMINE  Not Detected  Not Detected  Not Detected Not Detected    Comment: INTERPRETIVE INFORMATION:Methamphetamine, U  Positive Cutoff: 200 ng/mL  Methodology: Mass Spectrometry   MDMA- ECSTASY  Not Detected  Not Detected  Not Detected Not Detected    Comment: INTERPRETIVE INFORMATION:MDMA, U  Positive Cutoff: 200 ng/mL  Methodology: Mass Spectrometry   MDA  Not Detected  Not Detected  Not Detected Not Detected    Comment: INTERPRETIVE INFORMATION:MDA, U  Positive Cutoff: 200 ng/mL  Methodology: Mass Spectrometry   MDEA- Kait  Not Detected  Not Detected  Not Detected Not Detected    Comment: INTERPRETIVE INFORMATION:MDEA, U  Positive Cutoff: 200 ng/mL  Methodology: Mass Spectrometry   METHYLPHENIDATE  Not Detected  Not Detected  Not Detected Not Detected    Comment: INTERPRETIVE INFORMATION:Methylphenidate, U  Positive Cutoff: 100 ng/mL  Methodology: Mass Spectrometry   PHENTERMINE  Not Detected  Not Detected  Not Detected Not Detected    Comment: INTERPRETIVE INFORMATION:Phentermine, U  Positive Cutoff: 100 ng/mL  Methodology: Mass Spectrometry   BENZOYLECGONINE  Negative  Not Detected  Not Detected Not Detected    Comment: Presumptive negative by immunoassay. Testing by mass  spectrometry is available on request.  INTERPRETIVE INFORMATION:Cocaine Screen, U  Positive Cutoff: 150 ng/mL  Methodology: Immunoassay   ALPRAZOLAM  Not Detected  Not Detected  Not Detected Not Detected    Comment: INTERPRETIVE INFORMATION:Alprazolam, U  Positive Cutoff: 40 ng/mL  Methodology: Mass Spectrometry   ALPHA-OH-ALPRAZOLAM  Not Detected  Not Detected  Not Detected Not Detected    Comment: INTERPRETIVE INFORMATION:Alpha-OH-Alprazolam, U  Positive Cutoff: 20 ng/mL  Methodology: Mass Spectrometry   CLONAZEPAM  Not Detected  Not Detected  Not Detected Not Detected    Comment:  INTERPRETIVE INFORMATION:Clonazepam, U  Positive Cutoff: 20 ng/mL  Methodology: Mass Spectrometry   7-AMINOCLONAZEPAM  Not Detected  Not Detected  Not Detected Not Detected    Comment: INTERPRETIVE INFORMATION:7-Aminoclonazepam, U  Positive Cutoff: 40 ng/mL  Methodology: Mass Spectrometry   DIAZEPAM  Not Detected  Not Detected  Not Detected Not Detected    Comment: INTERPRETIVE INFORMATION:Diazepam, U  Positive Cutoff: 50 ng/mL  Methodology: Mass Spectrometry   NORDIAZEPAM  Not Detected  Not Detected  Not Detected Not Detected    Comment: INTERPRETIVE INFORMATION:Nordiazepam, U  Positive Cutoff: 50 ng/mL  Methodology: Mass Spectrometry   OXAZEPAM  Not Detected  Not Detected  Not Detected Not Detected    Comment: INTERPRETIVE INFORMATION:Oxazepam, U  Positive Cutoff: 50 ng/mL  Methodology: Mass Spectrometry   TEMAZEPAM  Not Detected  Not Detected  Not Detected Not Detected    Comment: INTERPRETIVE INFORMATION:Temazepam, U  Positive Cutoff: 50 ng/mL  Methodology: Mass Spectrometry   Lorazepam  Not Detected  Not Detected  Not Detected Not Detected    Comment: INTERPRETIVE INFORMATION:Lorazepam, U  Positive Cutoff: 60 ng/mL  Methodology: Mass Spectrometry   MIDAZOLAM  Not Detected  Not Detected  Not Detected Not Detected    Comment: INTERPRETIVE INFORMATION:Midazolam, U  Positive Cutoff: 20 ng/mL  Methodology: Mass Spectrometry   ZOLPIDEM  Not Detected  Not Detected  Not Detected Not Detected    Comment: INTERPRETIVE INFORMATION:Zolpidem, U  Positive Cutoff: 20 ng/mL  Methodology: Mass Spectrometry   BARBITURATES  Negative  Not Detected  Not Detected Not Detected    Comment: Presumptive negative by immunoassay. Testing by mass  spectrometry is available on request.  INTERPRETIVE INFORMATION:Barbiturates Screen, U  Positive Cutoff: 200 ng/mL  Methodology: Immunoassay   Creatinine, Urine 20.0 - 400.0 mg/dL 76.2 117.0 R 61.1 66.0 R 112.3 93.0 86.0 R, CM   ETHYL GLUCURONIDE  Negative  Not Detected  Not Detected Not Detected     Comment: Presumptive negative by immunoassay. Testing by mass  spectrometry is available on request.  INTERPRETIVE INFORMATION:Ethyl Glucuronide Screen, U  Positive Cutoff: 500 ng/mL  Methodology: Immunoassay   MARIJUANA METABOLITE  Negative  Not Detected CM  Not Detected Not Detected    Comment: Presumptive negative by immunoassay. Testing by mass  spectrometry is available on request.  INTERPRETIVE INFORMATION: THC (Cannabinoids) Screen, U  Positive Cutoff: 50 ng/mL  Methodology: Immunoassay   PCP  Negative  Not Detected  Not Detected Not Detected    Comment: Presumptive negative by immunoassay. Testing by mass  spectrometry is available on request.  INTERPRETIVE INFORMATION:Phencyclidine Screen, U  Positive Cutoff: 25 ng/mL  Methodology: Immunoassay   CARISOPRODOL  Negative  Not Detected CM  Not Detected CM Not Detected CM    Comment: Presumptive negative by immunoassay. Testing by mass  spectrometry is available on request.  INTERPRETIVE INFORMATION: Carisoprodol Screen, U  Positive Cutoff: 100 ng/mL  Methodology: Immunoassay  The carisoprodol immunoassay has cross-reactivity to  carisoprodol and meprobamate.   Naloxone  Not Detected  Not Detected  Not Detected Not Detected    Comment: INTERPRETIVE INFORMATION:Naloxone, U  Positive Cutoff: 100 ng/mL  Methodology: Mass Spectrometry   Gabapentin  Not Detected  Not Detected  Not Detected Not Detected    Comment: INTERPRETIVE INFORMATION:Gabapentin, U  Positive Cutoff: 3,000 ng/mL  Methodology: Mass Spectrometry   Pregabalin  Not Detected  Not Detected  Not Detected Not Detected    Comment: INTERPRETIVE INFORMATION:Pregabalin, U  Positive Cutoff: 3,000 ng/mL  Methodology: Mass Spectrometry   Alpha-OH-Midazolam  Not Detected  Not Detected  Not Detected Not Detected    Comment: INTERPRETIVE INFORMATION:Alpha-OH-Midazolam, U  Positive Cutoff: 20 ng/mL  Methodology: Mass Spectrometry   Zolpidem Metabolite  Not Detected  Not Detected  Not Detected Not Detected     Comment: INTERPRETIVE INFORMATION:Zolpidem Metabolite, U  Positive Cutoff: 100 ng/mL  Methodology: Mass Spectrometry

## 2024-10-15 ENCOUNTER — CLINICAL SUPPORT (OUTPATIENT)
Dept: REHABILITATION | Facility: HOSPITAL | Age: 59
End: 2024-10-15
Payer: MEDICARE

## 2024-10-15 DIAGNOSIS — R26.2 IMPAIRED AMBULATION: Primary | ICD-10-CM

## 2024-10-15 PROCEDURE — 97530 THERAPEUTIC ACTIVITIES: CPT | Mod: KX,PO

## 2024-10-15 PROCEDURE — 97112 NEUROMUSCULAR REEDUCATION: CPT | Mod: KX,PO

## 2024-10-15 NOTE — PROGRESS NOTES
"[facility]   Physical Therapy Treatment Note      Name: Alivia Marie Cyr  Clinic Number: [id number]    Therapy Diagnosis:   Encounter Diagnosis   Name Primary?    Impaired ambulation Yes         Physician: Seema Weinberg NP    Visit Date: 10/15/2024    Evaluation Date: 8/13/2024  Authorization Period Expiration: 12/31/2024  Plan of Care Expiration: 10/13/2024 extended to 12/13/2024  Progress Note Due: 11/10/24  Date of Surgery: 7/18/2024  Visit # / Visits authorized: 15/29   FOTO: 2/ 3    Precautions: Standard and Diabetes     Time In: 8:30  Time Out: 9:00 am  Total Billable Time: 30 minutes      PTA Visit #: 1/5     Subjective     Patient reports: she is tired from a busy weekend.  She  reports that she is  compliant with home exercise program.  Response to previous treatment: felt fine   Functional change: ongoing    Pain: 8/10  Location: right knee - anterior shin    Objective      Observation: Pt entered the clinic ambulating with a RW         Treatment   Bold=performed  Alivia received the treatments listed below:      therapeutic exercises to develop strength, endurance, ROM, flexibility, posture, and core stabilization for 15 minutes including:  Heel slides 20 x 2  bridges 2 x 10  Side lying clamshells 2x10x3"  seated toe raises 3x10   standing heel raises 2x10  nustep x 7 min    Heel prop instructed for HEP  Ankle pumps 10 x 2 - for HEP    manual therapy techniques: Joint mobilizations and Soft tissue Mobilization were applied to the: R knee for 0 minutes, including:  R patella mobilization   R distal quadriceps soft tissue mobilization  Patella tendon soft tissue mobilization    neuromuscular re-education activities to improve: Balance, Coordination, Kinesthetic, Sense, Proprioception, and Posture for 13 minutes. The following activities were included:  Quad sets 3 x 10 and x 10 minutes with Russian ES to R quad 10 sec on/off with a towel roll under R knee  Quad sets 20x5"  SAQ " "2x10 2#  LAQ 2# 2x10  standing weight shifts in walker x2 min    therapeutic activities to improve functional performance for 15  minutes, including:  +step ups on 4" step 2x10  leg press 40# 2x10  +single leg press 20# 2x10    gait training to improve functional mobility and safety for 0  minutes, including:      Patient Education and Home Exercises       Education provided:   - written HEP    Written Home Exercises Provided: Yes. Exercises were reviewed and Alivia was able to demonstrate them prior to the end of the session.  Alivia demonstrated good  understanding of the education provided. See Electronic Medical Record under Patient Instructions for exercises provided during therapy sessions    Assessment     Chinmay presents to treatment ambulating without AD and continues to c/o significant pain in her R knee.  Pt arrived late for Tx today and was not able to perform all of her exercises.  She was able to tolerate today's Tx with continued pian in her R knee.        Alivia Is progressing well towards her goals.   Patient prognosis is Good.     Patient will continue to benefit from skilled outpatient physical therapy to address the deficits listed in the problem list box on initial evaluation, provide pt/family education and to maximize pt's level of independence in the home and community environment.     Patient's spiritual, cultural and educational needs considered and pt agreeable to plan of care and goals.     Anticipated barriers to physical therapy: compliance    Goals:   Short Term Goals: 4 weeks   Pt will be instructed in an exercise program to address functional deficits related to her L LE Sx MET  Improve L knee ROM to 0 degrees of extension Progressing  Improve L knee flexion to 100 degrees - Progressing  Pt will report decreased L knee pain to </= 6/10 at worst - Progressing     Long Term Goals: 16 weeks   Pt will be independent in a HEP to assist in managing their L LE Sx. - Progressing   Improve L knee " flexion to >/= 110 degrees - Progressing  Pt will be an independent community ambulator with the least restrictive assistive device.MET  Pt will report improved functional ability through an improved score on the FOTO knee survey. - Progressing  Plan     Progress Physical Therapy program to assist Pt with managing her R LE Sx.    Beltran Estes, PT

## 2024-10-22 DIAGNOSIS — Z96.651 S/P REVISION OF TOTAL KNEE, RIGHT: ICD-10-CM

## 2024-10-22 RX ORDER — DEXTROMETHORPHAN HYDROBROMIDE, GUAIFENESIN 5; 100 MG/5ML; MG/5ML
650 LIQUID ORAL EVERY 8 HOURS PRN
Qty: 90 TABLET | Refills: 1 | Status: SHIPPED | OUTPATIENT
Start: 2024-11-04

## 2024-10-22 RX ORDER — TIZANIDINE 4 MG/1
4 TABLET ORAL EVERY 8 HOURS PRN
Qty: 90 TABLET | Refills: 2 | Status: SHIPPED | OUTPATIENT
Start: 2024-10-22 | End: 2025-01-20

## 2024-11-02 ENCOUNTER — PATIENT MESSAGE (OUTPATIENT)
Dept: BARIATRICS | Facility: CLINIC | Age: 59
End: 2024-11-02
Payer: MEDICARE

## 2024-11-12 ENCOUNTER — PATIENT MESSAGE (OUTPATIENT)
Dept: BARIATRICS | Facility: CLINIC | Age: 59
End: 2024-11-12
Payer: MEDICARE

## 2024-11-13 ENCOUNTER — PATIENT MESSAGE (OUTPATIENT)
Dept: PAIN MEDICINE | Facility: CLINIC | Age: 59
End: 2024-11-13
Payer: MEDICARE

## 2024-11-18 ENCOUNTER — TELEPHONE (OUTPATIENT)
Dept: PAIN MEDICINE | Facility: CLINIC | Age: 59
End: 2024-11-18
Payer: MEDICARE

## 2024-11-18 NOTE — TELEPHONE ENCOUNTER
"----- Message from Maggie sent at 11/18/2024  1:43 PM CST -----  Regarding: Appointment  "Type:  Patient Call Back    Who Called:PT    What is the reqeust in detail:Pt requesting call back in regards to her appt on 11/19. Pt would like to know why her appt was changed. Please advise    Can the clinic reply by [facility]?no     Best Call Back Number:[phone]      Additional Information:  "

## 2024-11-18 NOTE — TELEPHONE ENCOUNTER
Staff spoke with patient and informed her why her appointment was changed and let her know a message was sent out to her. Patient states she can't make it and wants to be seen by someone else. Staff rescheduled her with Anisa on 11/19. Patient is in agreement to the above and verbalized understanding.

## 2024-11-20 DIAGNOSIS — Z96.651 S/P REVISION OF TOTAL KNEE, RIGHT: Primary | ICD-10-CM

## 2024-11-20 DIAGNOSIS — M79.672 BILATERAL FOOT PAIN: Chronic | ICD-10-CM

## 2024-11-20 DIAGNOSIS — E11.49 TYPE II DIABETES MELLITUS WITH NEUROLOGICAL MANIFESTATIONS: Chronic | ICD-10-CM

## 2024-11-20 DIAGNOSIS — M79.671 BILATERAL FOOT PAIN: Chronic | ICD-10-CM

## 2024-11-20 DIAGNOSIS — Z96.652 STATUS POST TOTAL LEFT KNEE REPLACEMENT: ICD-10-CM

## 2024-11-20 RX ORDER — LIDOCAINE AND PRILOCAINE 25; 25 MG/G; MG/G
CREAM TOPICAL
Qty: 30 G | Refills: 2 | Status: SHIPPED | OUTPATIENT
Start: 2024-11-20

## 2024-11-21 DIAGNOSIS — G89.4 CHRONIC PAIN SYNDROME: ICD-10-CM

## 2024-11-21 DIAGNOSIS — M25.561 CHRONIC PAIN OF BOTH KNEES: ICD-10-CM

## 2024-11-21 DIAGNOSIS — M17.0 PRIMARY OSTEOARTHRITIS OF BOTH KNEES: ICD-10-CM

## 2024-11-21 DIAGNOSIS — M25.562 CHRONIC PAIN OF BOTH KNEES: ICD-10-CM

## 2024-11-21 DIAGNOSIS — M51.369 DDD (DEGENERATIVE DISC DISEASE), LUMBAR: ICD-10-CM

## 2024-11-21 DIAGNOSIS — G89.29 CHRONIC PAIN OF BOTH KNEES: ICD-10-CM

## 2024-11-21 DIAGNOSIS — M47.816 SPONDYLOSIS OF LUMBAR REGION WITHOUT MYELOPATHY OR RADICULOPATHY: ICD-10-CM

## 2024-11-21 RX ORDER — OXYCODONE AND ACETAMINOPHEN 10; 325 MG/1; MG/1
1 TABLET ORAL
Qty: 105 TABLET | Refills: 0 | Status: SHIPPED | OUTPATIENT
Start: 2024-11-21

## 2024-11-21 NOTE — TELEPHONE ENCOUNTER
Patient requesting refill on Percocet  Last office visit 9/13/24   shows last refill on 10/22/24  Patient does have a pain contract on file with [facility]  Patient last UDS 05/29/24 was not consistent with current therapy    CODEINE  Not Detected  Not Detected  Not Detected Not Detected    Comment: INTERPRETIVE INFORMATION: Codeine, U  Positive Cutoff: 40 ng/mL  Methodology: Mass Spectrometry   MORPHINE  Not Detected  Not Detected  Not Detected Not Detected    Comment: INTERPRETIVE INFORMATION:Morphine, U  Positive Cutoff: 20 ng/mL  Methodology: Mass Spectrometry   6-ACETYLMORPHINE  Not Detected  Not Detected  Not Detected Not Detected    Comment: INTERPRETIVE INFORMATION:6-acetylmorphine, U  Positive Cutoff: 20 ng/mL  Methodology: Mass Spectrometry   OXYCODONE  Not Detected  Present  Present Present    Comment: INTERPRETIVE INFORMATION:Oxycodone, U  Positive Cutoff: 40 ng/mL  Methodology: Mass Spectrometry   NOROYXCODONE  Present  Present  Present Present    Comment: INTERPRETIVE INFORMATION:Noroxycodone, U  Positive Cutoff: 100 ng/mL  Methodology: Mass Spectrometry   OXYMORPHONE  Not Detected  Present  Present Present    Comment: INTERPRETIVE INFORMATION:Oxymorphone, U  Positive Cutoff: 40 ng/mL  Methodology: Mass Spectrometry   NOROXYMORPHONE  Not Detected  Not Detected  Not Detected Not Detected    Comment: INTERPRETIVE INFORMATION:Noroxymorphone, U  Positive Cutoff: 100 ng/mL  Methodology: Mass Spectrometry   HYDROCODONE  Not Detected  Not Detected  Not Detected Not Detected    Comment: INTERPRETIVE INFORMATION:Hydrocodone, U  Positive Cutoff: 40 ng/mL  Methodology: Mass Spectrometry   NORHYDROCODONE  Not Detected  Not Detected  Not Detected Not Detected    Comment: INTERPRETIVE INFORMATION:Norhydrocodone, U  Positive Cutoff: 100 ng/mL  Methodology: Mass Spectrometry   HYDROMORPHONE  Not Detected  Not Detected  Not Detected Not Detected    Comment: INTERPRETIVE  INFORMATION:Hydromorphone, U  Positive Cutoff: 20 ng/mL  Methodology: Mass Spectrometry   BUPRENORPHINE  Not Detected  Not Detected  Not Detected Not Detected    Comment: INTERPRETIVE INFORMATION:Buprenorphine, U  Positive Cutoff: 5 ng/mL  Methodology: Mass Spectrometry   NORUBPRENORPHINE  Not Detected  Not Detected  Not Detected Not Detected    Comment: INTERPRETIVE INFORMATION:Norbuprenorphine, U  Positive Cutoff: 20 ng/mL  Methodology: Mass Spectrometry   FENTANYL  Not Detected  Not Detected  Not Detected Not Detected    Comment: INTERPRETIVE INFORMATION:Fentanyl, U  Positive Cutoff: 2 ng/mL  Methodology: Mass Spectrometry   NORFENTANYL  Not Detected  Not Detected  Not Detected Not Detected    Comment: INTERPRETIVE INFORMATION:Norfentanyl, U  Positive Cutoff: 2 ng/mL  Methodology: Mass Spectrometry   MEPERIDINE METABOLITE  Not Detected  Not Detected  Not Detected Not Detected    Comment: INTERPRETIVE INFORMATION:Meperidine metabolite, U  Positive Cutoff: 50 ng/mL  Methodology: Mass Spectrometry   TAPENTADOL  Not Detected  Not Detected  Not Detected Not Detected    Comment: INTERPRETIVE INFORMATION:Tapentadol, U  Positive Cutoff: 100 ng/mL  Methodology: Mass Spectrometry   TAPENTADOL-O-SULF  Not Detected  Not Detected  Not Detected Not Detected    Comment: INTERPRETIVE INFORMATION:Tapentadol-o-Sulf, U  Positive Cutoff: 200 ng/mL  Methodology: Mass Spectrometry   METHADONE  Negative  Not Detected  Not Detected Not Detected    Comment: Presumptive negative by immunoassay. Testing by mass  spectrometry is available on request.  INTERPRETIVE INFORMATION: Methadone Screen, U  Positive Cutoff: 150 ng/mL  Methodology: Immunoassay   TRAMADOL  Negative  Not Detected  Not Detected Not Detected    Comment: Presumptive negative by immunoassay. Testing by mass  spectrometry is available on request.  INTERPRETIVE INFORMATION:Tramadol Screen, U  Positive Cutoff: 100 ng/mL  Methodology: Immunoassay   AMPHETAMINE  Not Detected   Not Detected  Not Detected Not Detected    Comment: INTERPRETIVE INFORMATION:Amphetamine, U  Positive Cutoff: 50 ng/mL  Methodology: Mass Spectrometry   METHAMPHETAMINE  Not Detected  Not Detected  Not Detected Not Detected    Comment: INTERPRETIVE INFORMATION:Methamphetamine, U  Positive Cutoff: 200 ng/mL  Methodology: Mass Spectrometry   MDMA- ECSTASY  Not Detected  Not Detected  Not Detected Not Detected    Comment: INTERPRETIVE INFORMATION:MDMA, U  Positive Cutoff: 200 ng/mL  Methodology: Mass Spectrometry   MDA  Not Detected  Not Detected  Not Detected Not Detected    Comment: INTERPRETIVE INFORMATION:MDA, U  Positive Cutoff: 200 ng/mL  Methodology: Mass Spectrometry   MDEA- Kait  Not Detected  Not Detected  Not Detected Not Detected    Comment: INTERPRETIVE INFORMATION:MDEA, U  Positive Cutoff: 200 ng/mL  Methodology: Mass Spectrometry   METHYLPHENIDATE  Not Detected  Not Detected  Not Detected Not Detected    Comment: INTERPRETIVE INFORMATION:Methylphenidate, U  Positive Cutoff: 100 ng/mL  Methodology: Mass Spectrometry   PHENTERMINE  Not Detected  Not Detected  Not Detected Not Detected    Comment: INTERPRETIVE INFORMATION:Phentermine, U  Positive Cutoff: 100 ng/mL  Methodology: Mass Spectrometry   BENZOYLECGONINE  Negative  Not Detected  Not Detected Not Detected    Comment: Presumptive negative by immunoassay. Testing by mass  spectrometry is available on request.  INTERPRETIVE INFORMATION:Cocaine Screen, U  Positive Cutoff: 150 ng/mL  Methodology: Immunoassay   ALPRAZOLAM  Not Detected  Not Detected  Not Detected Not Detected    Comment: INTERPRETIVE INFORMATION:Alprazolam, U  Positive Cutoff: 40 ng/mL  Methodology: Mass Spectrometry   ALPHA-OH-ALPRAZOLAM  Not Detected  Not Detected  Not Detected Not Detected    Comment: INTERPRETIVE INFORMATION:Alpha-OH-Alprazolam, U  Positive Cutoff: 20 ng/mL  Methodology: Mass Spectrometry   CLONAZEPAM  Not Detected  Not Detected  Not Detected Not Detected    Comment:  INTERPRETIVE INFORMATION:Clonazepam, U  Positive Cutoff: 20 ng/mL  Methodology: Mass Spectrometry   7-AMINOCLONAZEPAM  Not Detected  Not Detected  Not Detected Not Detected    Comment: INTERPRETIVE INFORMATION:7-Aminoclonazepam, U  Positive Cutoff: 40 ng/mL  Methodology: Mass Spectrometry   DIAZEPAM  Not Detected  Not Detected  Not Detected Not Detected    Comment: INTERPRETIVE INFORMATION:Diazepam, U  Positive Cutoff: 50 ng/mL  Methodology: Mass Spectrometry   NORDIAZEPAM  Not Detected  Not Detected  Not Detected Not Detected    Comment: INTERPRETIVE INFORMATION:Nordiazepam, U  Positive Cutoff: 50 ng/mL  Methodology: Mass Spectrometry   OXAZEPAM  Not Detected  Not Detected  Not Detected Not Detected    Comment: INTERPRETIVE INFORMATION:Oxazepam, U  Positive Cutoff: 50 ng/mL  Methodology: Mass Spectrometry   TEMAZEPAM  Not Detected  Not Detected  Not Detected Not Detected    Comment: INTERPRETIVE INFORMATION:Temazepam, U  Positive Cutoff: 50 ng/mL  Methodology: Mass Spectrometry   Lorazepam  Not Detected  Not Detected  Not Detected Not Detected    Comment: INTERPRETIVE INFORMATION:Lorazepam, U  Positive Cutoff: 60 ng/mL  Methodology: Mass Spectrometry   MIDAZOLAM  Not Detected  Not Detected  Not Detected Not Detected    Comment: INTERPRETIVE INFORMATION:Midazolam, U  Positive Cutoff: 20 ng/mL  Methodology: Mass Spectrometry   ZOLPIDEM  Not Detected  Not Detected  Not Detected Not Detected    Comment: INTERPRETIVE INFORMATION:Zolpidem, U  Positive Cutoff: 20 ng/mL  Methodology: Mass Spectrometry   BARBITURATES  Negative  Not Detected  Not Detected Not Detected    Comment: Presumptive negative by immunoassay. Testing by mass  spectrometry is available on request.  INTERPRETIVE INFORMATION:Barbiturates Screen, U  Positive Cutoff: 200 ng/mL  Methodology: Immunoassay   Creatinine, Urine 20.0 - 400.0 mg/dL 76.2 117.0 R 61.1 66.0 R 112.3 93.0 86.0 R, CM   ETHYL GLUCURONIDE  Negative  Not Detected  Not Detected Not Detected     Comment: Presumptive negative by immunoassay. Testing by mass  spectrometry is available on request.  INTERPRETIVE INFORMATION:Ethyl Glucuronide Screen, U  Positive Cutoff: 500 ng/mL  Methodology: Immunoassay   MARIJUANA METABOLITE  Negative  Not Detected CM  Not Detected Not Detected    Comment: Presumptive negative by immunoassay. Testing by mass  spectrometry is available on request.  INTERPRETIVE INFORMATION: THC (Cannabinoids) Screen, U  Positive Cutoff: 50 ng/mL  Methodology: Immunoassay   PCP  Negative  Not Detected  Not Detected Not Detected    Comment: Presumptive negative by immunoassay. Testing by mass  spectrometry is available on request.  INTERPRETIVE INFORMATION:Phencyclidine Screen, U  Positive Cutoff: 25 ng/mL  Methodology: Immunoassay   CARISOPRODOL  Negative  Not Detected CM  Not Detected CM Not Detected CM    Comment: Presumptive negative by immunoassay. Testing by mass  spectrometry is available on request.  INTERPRETIVE INFORMATION: Carisoprodol Screen, U  Positive Cutoff: 100 ng/mL  Methodology: Immunoassay  The carisoprodol immunoassay has cross-reactivity to  carisoprodol and meprobamate.   Naloxone  Not Detected  Not Detected  Not Detected Not Detected    Comment: INTERPRETIVE INFORMATION:Naloxone, U  Positive Cutoff: 100 ng/mL  Methodology: Mass Spectrometry   Gabapentin  Not Detected  Not Detected  Not Detected Not Detected    Comment: INTERPRETIVE INFORMATION:Gabapentin, U  Positive Cutoff: 3,000 ng/mL  Methodology: Mass Spectrometry   Pregabalin  Not Detected  Not Detected  Not Detected Not Detected    Comment: INTERPRETIVE INFORMATION:Pregabalin, U  Positive Cutoff: 3,000 ng/mL  Methodology: Mass Spectrometry   Alpha-OH-Midazolam  Not Detected  Not Detected  Not Detected Not Detected    Comment: INTERPRETIVE INFORMATION:Alpha-OH-Midazolam, U  Positive Cutoff: 20 ng/mL  Methodology: Mass Spectrometry   Zolpidem Metabolite  Not Detected  Not Detected  Not Detected Not Detected     Comment: INTERPRETIVE INFORMATION:Zolpidem Metabolite, U  Positive Cutoff: 100 ng/mL  Methodology: Mass Spectrometry

## 2024-11-21 NOTE — TELEPHONE ENCOUNTER
----- Message from Jorge Luis sent at 11/21/2024  1:06 PM CST -----  Regarding: Refill Request    Who Called: Patient        New Prescription or Refill : Refill    RX Name and Strength:   oxyCODONE-acetaminophen (PERCOCET)  mg per tablet      RX Name and Strength:   Tylenol      RX Name and Strength:      30 day or 90 day RX:     Preferred Pharmacy:[facility] - [place] [address]        Would the patient rather a call back or a response via [facility]?    Best Call Back Number:   [phone]    Additional Information:

## 2024-11-26 ENCOUNTER — PATIENT MESSAGE (OUTPATIENT)
Dept: ADMINISTRATIVE | Facility: HOSPITAL | Age: 59
End: 2024-11-26
Payer: MEDICARE

## 2024-12-03 ENCOUNTER — PATIENT MESSAGE (OUTPATIENT)
Dept: BARIATRICS | Facility: CLINIC | Age: 59
End: 2024-12-03
Payer: MEDICARE

## 2024-12-09 ENCOUNTER — HOSPITAL ENCOUNTER (OUTPATIENT)
Dept: RADIOLOGY | Facility: HOSPITAL | Age: 59
Discharge: HOME | End: 2024-12-09
Attending: ORTHOPAEDIC SURGERY
Payer: MEDICARE

## 2024-12-09 DIAGNOSIS — Z96.652 STATUS POST TOTAL LEFT KNEE REPLACEMENT: ICD-10-CM

## 2024-12-09 DIAGNOSIS — Z96.651 S/P REVISION OF TOTAL KNEE, RIGHT: ICD-10-CM

## 2024-12-09 PROCEDURE — 73562 X-RAY EXAM OF KNEE 3: CPT | Mod: 26,50,, | Performed by: RADIOLOGY

## 2024-12-09 PROCEDURE — 73562 X-RAY EXAM OF KNEE 3: CPT | Mod: TC,50

## 2024-12-10 ENCOUNTER — PATIENT MESSAGE (OUTPATIENT)
Dept: BARIATRICS | Facility: CLINIC | Age: 59
End: 2024-12-10
Payer: MEDICARE

## 2024-12-11 NOTE — NURSING TRANSFER
Nursing Transfer Note      7/18/2024   7:51 PM    Reason patient is being transferred: post procedure     Transfer To: 502    Transfer via bed    Transfer with 2L NC     Transported by transport     Patient belongings transferred with patient: with family     Chart send with patient: Yes    Notified: family @ bedside     Patient reassessed at: 7/18/2024 0390       Problem: Potential for Falls  Goal: Patient will remain free of falls  Description: INTERVENTIONS:  - Educate patient/family on patient safety including physical limitations  - Instruct patient to call for assistance with activity   - Consult OT/PT to assist with strengthening/mobility   - Keep Call bell within reach  - Keep bed low and locked with side rails adjusted as appropriate  - Keep care items and personal belongings within reach  - Initiate and maintain comfort rounds  - Make Fall Risk Sign visible to staff  - Offer Toileting every 2 Hours, in advance of need  - Initiate/Maintain bed alarm  - Obtain necessary fall risk management equipment: call bell, side table, socks.   - Apply yellow socks and bracelet for high fall risk patients  - Consider moving patient to room near nurses station  Outcome: Progressing     Problem: PAIN - ADULT  Goal: Verbalizes/displays adequate comfort level or baseline comfort level  Description: Interventions:  - Encourage patient to monitor pain and request assistance  - Assess pain using appropriate pain scale  - Administer analgesics based on type and severity of pain and evaluate response  - Implement non-pharmacological measures as appropriate and evaluate response  - Consider cultural and social influences on pain and pain management  - Notify physician/advanced practitioner if interventions unsuccessful or patient reports new pain  Outcome: Progressing     Problem: INFECTION - ADULT  Goal: Absence or prevention of progression during hospitalization  Description: INTERVENTIONS:  - Assess and monitor for signs and symptoms of infection  - Monitor lab/diagnostic results  - Monitor all insertion sites, i.e. indwelling lines, tubes, and drains  - Monitor endotracheal if appropriate and nasal secretions for changes in amount and color  - [place] appropriate cooling/warming therapies per order  - Administer medications as ordered  - Instruct and encourage patient and family to  use good hand hygiene technique  - Identify and instruct in appropriate isolation precautions for identified infection/condition  Outcome: Progressing  Goal: Absence of fever/infection during neutropenic period  Description: INTERVENTIONS:  - Monitor WBC    Outcome: Progressing     Problem: SAFETY ADULT  Goal: Patient will remain free of falls  Description: INTERVENTIONS:  - Educate patient/family on patient safety including physical limitations  - Instruct patient to call for assistance with activity   - Consult OT/PT to assist with strengthening/mobility   - Keep Call bell within reach  - Keep bed low and locked with side rails adjusted as appropriate  - Keep care items and personal belongings within reach  - Initiate and maintain comfort rounds  - Make Fall Risk Sign visible to staff  - Offer Toileting every 2 Hours, in advance of need  - Initiate/Maintain bed alarm  - Obtain necessary fall risk management equipment: see above  - Apply yellow socks and bracelet for high fall risk patients  - Consider moving patient to room near nurses station  Outcome: Progressing  Goal: Maintain or return to baseline ADL function  Description: INTERVENTIONS:  -  Assess patient's ability to carry out ADLs; assess patient's baseline for ADL function and identify physical deficits which impact ability to perform ADLs (bathing, care of mouth/teeth, toileting, grooming, dressing, etc.)  - Assess/evaluate cause of self-care deficits   - Assess range of motion  - Assess patient's mobility; develop plan if impaired  - Assess patient's need for assistive devices and provide as appropriate  - Encourage maximum independence but intervene and supervise when necessary  - Involve family in performance of ADLs  - Assess for home care needs following discharge   - Consider OT consult to assist with ADL evaluation and planning for discharge  - Provide patient education as appropriate  Outcome: Progressing  Goal: Maintains/Returns to pre  admission functional level  Description: INTERVENTIONS:  - Perform AM-PAC 6 Click Basic Mobility/ Daily Activity assessment daily.  - Set and communicate daily mobility goal to care team and patient/family/caregiver.   - Collaborate with rehabilitation services on mobility goals if consulted  - Perform Range of Motion 4 times a day.  - Reposition patient every 2 hours.  - Dangle patient 3 times a day  - Stand patient 3 times a day  - Ambulate patient 3 times a day  - Out of bed to chair 3 times a day   - Out of bed for meals 3 times a day  - Out of bed for toileting  - Record patient progress and toleration of activity level   Outcome: Progressing     Problem: DISCHARGE PLANNING  Goal: Discharge to home or other facility with appropriate resources  Description: INTERVENTIONS:  - Identify barriers to discharge w/patient and caregiver  - Arrange for needed discharge resources and transportation as appropriate  - Identify discharge learning needs (meds, wound care, etc.)  - Arrange for interpretive services to assist at discharge as needed  - Refer to Case Management Department for coordinating discharge planning if the patient needs post-hospital services based on physician/advanced practitioner order or complex needs related to functional status, cognitive ability, or social support system  Outcome: Progressing     Problem: Knowledge Deficit  Goal: Patient/family/caregiver demonstrates understanding of disease process, treatment plan, medications, and discharge instructions  Description: Complete learning assessment and assess knowledge base.  Interventions:  - Provide teaching at level of understanding  - Provide teaching via preferred learning methods  Outcome: Progressing

## 2024-12-16 ENCOUNTER — OFFICE VISIT (OUTPATIENT)
Dept: ORTHOPEDICS | Facility: CLINIC | Age: 59
End: 2024-12-16
Payer: MEDICARE

## 2024-12-16 VITALS — WEIGHT: 263 LBS | HEIGHT: 65 IN | BODY MASS INDEX: 43.82 KG/M2

## 2024-12-16 DIAGNOSIS — Z96.651 S/P REVISION OF TOTAL KNEE, RIGHT: Primary | ICD-10-CM

## 2024-12-16 DIAGNOSIS — M25.561 CHRONIC PAIN OF RIGHT KNEE: ICD-10-CM

## 2024-12-16 DIAGNOSIS — G89.29 CHRONIC PAIN OF RIGHT KNEE: ICD-10-CM

## 2024-12-16 PROCEDURE — 99214 OFFICE O/P EST MOD 30 MIN: CPT | Mod: PBBFAC | Performed by: ORTHOPAEDIC SURGERY

## 2024-12-16 PROCEDURE — 99999 PR PBB SHADOW E&M-EST. PATIENT-LVL IV: CPT | Mod: PBBFAC,,, | Performed by: ORTHOPAEDIC SURGERY

## 2024-12-16 PROCEDURE — 99213 OFFICE O/P EST LOW 20 MIN: CPT | Mod: S$PBB,,, | Performed by: ORTHOPAEDIC SURGERY

## 2024-12-16 NOTE — PROGRESS NOTES
Alivia Thomas is in for 5 month follow up for a  right revision TKA.  She is doing fairly well.  Moderate pain in the knee.  She has resumed activities of daily living. She has been doing aquatic exercises  Exam demonstrates  A well developed female in no distress.  Alert and oriented.  Mood and affect are appropriate.    Knee incision is well healed.  ROM is 0-1209!0 degree extensor lag).  The patella tracks well and there is no instability. The extremity is neurovascularly intact.    Xrays demonstrate a well fixed and positioned prosthesis.         No data to display                     No data to display                     No data to display                    7/1/2024     1:00 PM   Knee Society and Function Score   FINDINGS - KNEE SOCIETY SCORE 50   FINDINGS - KNEE SOCIETY FUNCTION SCORE 50            No data to display                Imp:Progressing    She would benefit from PT.  She would like to stick with her self directed aquatic program    F/u in 6 months with xrays

## 2024-12-18 ENCOUNTER — OFFICE VISIT (OUTPATIENT)
Dept: PAIN MEDICINE | Facility: CLINIC | Age: 59
End: 2024-12-18
Attending: ANESTHESIOLOGY
Payer: MEDICARE

## 2024-12-18 ENCOUNTER — PATIENT MESSAGE (OUTPATIENT)
Dept: PAIN MEDICINE | Facility: OTHER | Age: 59
End: 2024-12-18
Payer: MEDICARE

## 2024-12-18 VITALS
HEART RATE: 67 BPM | DIASTOLIC BLOOD PRESSURE: 81 MMHG | WEIGHT: 263 LBS | BODY MASS INDEX: 43.77 KG/M2 | SYSTOLIC BLOOD PRESSURE: 133 MMHG | TEMPERATURE: 98 F

## 2024-12-18 DIAGNOSIS — M25.562 CHRONIC PAIN OF BOTH KNEES: ICD-10-CM

## 2024-12-18 DIAGNOSIS — G89.4 CHRONIC PAIN SYNDROME: ICD-10-CM

## 2024-12-18 DIAGNOSIS — M47.816 SPONDYLOSIS OF LUMBAR REGION WITHOUT MYELOPATHY OR RADICULOPATHY: ICD-10-CM

## 2024-12-18 DIAGNOSIS — M47.816 LUMBAR SPONDYLOSIS: Primary | ICD-10-CM

## 2024-12-18 DIAGNOSIS — M25.561 CHRONIC PAIN OF BOTH KNEES: ICD-10-CM

## 2024-12-18 DIAGNOSIS — M51.360 DEGENERATION OF INTERVERTEBRAL DISC OF LUMBAR REGION WITH DISCOGENIC BACK PAIN: ICD-10-CM

## 2024-12-18 DIAGNOSIS — M54.17 LUMBOSACRAL RADICULOPATHY: ICD-10-CM

## 2024-12-18 DIAGNOSIS — M51.369 DDD (DEGENERATIVE DISC DISEASE), LUMBAR: ICD-10-CM

## 2024-12-18 DIAGNOSIS — M54.17 LUMBOSACRAL RADICULOPATHY: Primary | ICD-10-CM

## 2024-12-18 DIAGNOSIS — M17.0 PRIMARY OSTEOARTHRITIS OF BOTH KNEES: ICD-10-CM

## 2024-12-18 DIAGNOSIS — G89.29 CHRONIC PAIN OF BOTH KNEES: ICD-10-CM

## 2024-12-18 PROCEDURE — 99999 PR PBB SHADOW E&M-EST. PATIENT-LVL III: CPT | Mod: PBBFAC,,, | Performed by: ANESTHESIOLOGY

## 2024-12-18 PROCEDURE — 99214 OFFICE O/P EST MOD 30 MIN: CPT | Mod: S$PBB,GC,, | Performed by: ANESTHESIOLOGY

## 2024-12-18 PROCEDURE — 99213 OFFICE O/P EST LOW 20 MIN: CPT | Mod: PBBFAC | Performed by: ANESTHESIOLOGY

## 2024-12-18 PROCEDURE — 80307 DRUG TEST PRSMV CHEM ANLYZR: CPT | Performed by: STUDENT IN AN ORGANIZED HEALTH CARE EDUCATION/TRAINING PROGRAM

## 2024-12-18 RX ORDER — OXYCODONE AND ACETAMINOPHEN 10; 325 MG/1; MG/1
1 TABLET ORAL
Qty: 105 TABLET | Refills: 0 | Status: SHIPPED | OUTPATIENT
Start: 2024-12-20

## 2024-12-18 RX ORDER — NALOXONE HYDROCHLORIDE 4 MG/.1ML
SPRAY NASAL
Qty: 1 EACH | Refills: 11 | Status: SHIPPED | OUTPATIENT
Start: 2024-12-18

## 2024-12-18 NOTE — H&P (VIEW-ONLY)
Chronic Pain-Established Note (Follow up visit)           SUBJECTIVE:    Interval History 12/18/2024:  Patient returns to clinic for follow up of low back pain and knee pain. She has completed physical therapy for her knees and continues to do HEP as well as swimming. She continues to take Percocet four times a day, she states she was taking three to four times a day prior to surgery, and has not been able to reduce since the surgery. She denies any recent health changes. She denies recent falls or trauma. She denies new onset fever/night sweats, urinary incontinence, bowel incontinence, significant weight changes, significant motor weakness or changes, or loss of sensations.    Her back pain is focused to the middle low back, the pain is axial in nature mainly, but can radiate down the anterior thighs to the knees. Described as sharp/shooting and aching throbbing, exacerbated by leaning back, mitigated by rest. Pain is rated as 7/10. She inquires about repeat lumbar RFA, last one is in 2/12/2024, she endorses 2.5 months of good relief.       Interval History 9/13/2024:  Alivia Thomas returns to clinic virtually for follow-up after right TKA and post-operative pain. She states she has been doing well in physical therapy biweekly and has been doing home exercises most days. She states she is healing well and hoping to progress to aquatic therapy once finished with physical therapy for her knee. She has been on Percocet 10/325 4 times per day for the last month which has helped her tolerate and progress in physical therapy. She has about 1 week left of this last prescription. She believes she could probably tolerate decreasing her dose slightly in hopes of getting down to her previous dose of Percocet 10/325 TID PRN. She denies any recent health changes. She denies recent falls or trauma. She denies new onset fever/night sweats, urinary incontinence, bowel incontinence, significant weight changes, significant  motor weakness or changes, or loss of sensations. Her pain today is 7/10.     Interval History 8/16/2024:  Alivia Thomas returns to clinic for follow-up of chronic lower back pain. She is s/p right TKA revision on 7/18/2024. She continues Percocet with mild relief. She has been having increased pain since her TKA and with associated physical therapy. She is wondering if her dose could be temporarily increased. She also takes Tylenol arthritis with good relief. She denies any perceived side effects. She denies recent falls or trauma. She denies new onset fever/night sweats, urinary incontinence, bowel incontinence, significant weight changes, significant motor weakness or changes, or loss of sensations. Her pain today is 8/10.     Interval History 5/29/2024:  Alivia Thomas returns to clinic s/p Bilateral SIJ and GTB injections on 5/16/2024.  She reports 70% relief. She is able to complete ADLs with limited pain and her quality of life is improved. She takes Percocet 10/325 TID PRN with good relief. She denies any perceived side effects. She is going to restart water aerobics at her local pool.  She is having a right TKA revision in July.  After healing from that surgery is complete, she would like to start aquatic therapy for her back. The patient denies fever/night sweats, urinary incontinence, bowel incontinence, significant weight changes, significant motor weakness or changes, or loss of sensations. Her pain today is 4/10.    Interval History 4/29/2024:  Alivia Thomas returns today for follow-up of back pain and right knee pain.  She denies any radicular pain.  She notices bilateral lower back pain with pain into the buttocks and lateral thighs. She takes Percocet TID PRN with good relief.  She denies any perceived side effects. Her pharmacy has been out of the medication and she has been out since 3/10/2024.  The patient denies fever/night sweats, urinary incontinence, bowel incontinence, significant  weight changes, significant motor weakness or changes, or loss of sensations. Her pain today is 9/10.    Interval History 1/9/2024:  The patient returns to clinic today for follow up of back pain via virtual visit. She is s/p right L3,4,5 RFA on 11/27/2023. She reports 40-50% relief at this time. She did not have left sided RFA due to illness. Of note, she had a stroke last week. She began having a headache, left sided facial drooping, and slurring of her speech. She did go to the ER where she received TPA. She reports mild weakness into her left arm. She continues to report low back pain. She denies any radicular leg pain. Her pain is worse with activity. She also reports bilateral knee pain, worse with standing and walking. She is scheduled for genicular RFA in the next few weeks. She will reschedule left lumbar RFA. She is taking Percocet as needed with benefit. She denies any adverse effects. She denies any other health changes.      Interval History 10/24/23:  Alivia Thomas returns today for follow-up. Since last seen, they report their pain has been worsening. She last received a toradol shot instead of an epidural as she did not wish to have an epidural.  Today, she is here for follow-up with me to evaluate.  She is requesting to increase her oxycodone to 4 times a day instead of 3 times a day.  I had a long discussion with her and advised her we either change the medication but not increase it, try repeating radiofrequency ablation since it worked well for her and/or spinal cord stimulator.  Yesterday, surgery was another alternative for managing her pain.  She had a CT scan that we went over the results.  She has multilevel degenerative disease with severe facet degenerative disease and multilevel spinal and foraminal stenosis.  Yesterday, she declined the surgery.  Today she is open to repeating the radiofrequency ablation but she does not want to change the medication.  Previously, the radiofrequency  ablation lasted several months up to a year.  The last time she said it lasted about 3-4 month than the pain started coming back gradually over the following 3-4 months.  Overall, she still had good relief.  She is complaining of the right knee as well.  Much less pain on the left knee.  The radicular component of her back pain is in the dorsal thighs bilaterally worse on the right negligible on the left.  No new bowel or bladder symptomatology.  No fever, chills or night sweats.    Interval History 10/23/23: Alivai Thomas returns for follow up. This is a patient of Dr. Hurtado. Her visit with [place] was cancelled last week so she was placed on my schedule today. At her last visit she was having pain radiating down her legs, but did not want to have an epidural. They performed a Toradol shot (short term relief) and referred her to Neurosurgery. She saw Dr. Bartlett today who said either she will need SCS or decompression. On review of her CT and MRI, she has severe facet arthropathy and severe canal stenosis at L3/4 and L4/5. She notes being able to walk <1 block, after which she must sit down. She reports 8-10/10 pain and this is interfering with her life significantly.     Interval History 9/18/2023:  The patient presents today to request an injection for increased back pain. She has been having worsened pain over the past couple of weeks. She does not wish to have another KERI at this time. She is having lower back pain with radiation down the back of both legs, R>L. She has associated numbness and tingling as well as subjective weakness. No bowel or bladder incontinence. She has not seen neurosurgery. Her pain today is 10/10.    Interval History 8/14/2023:  The patient is here for follow up after procedure for back pain. She is now s/p L5/S1 IL KERI with about 50% relief. She still has lower back pain with radiation down the back of the legs, stopping at the knees. She has associated numbness to lower extremities.  Her pain worsens with walking, bending and reaching upward. She has been working on home exercises and stretches without much benefit. She has some benefit with medication as needed.  She report Her pain today is 9/10.    Interval History 7/10/2023:  The patient is here today for evaluation of increased lower back pain. She has a long-standing history of back pain which is mainly axial. She has had worsened symptoms over the past couple of weeks, most severe over the past 3 days. The pain now radiates down the back of both legs with burning and numbness. She finds it difficult to stand or walk for any length of time. No bowel or bladder incontinence. No fever or malaise. Medications are helping somewhat. She continues with home PT exercises as previously taught in PT. Her pain today is 10/10.    Interval History 4/10/2023:  The patient returns to clinic today for follow up of low back and knee pain via virtual visit. She is s/p right L3,4,5 RFA on 3/6/2023. Her left side procedure was rescheduled due to illness. This is scheduled for May 1st. She reports some relief of her right sided low back pain. She continues to report left sided low back pain. She denies any radicular leg pain. She does endorse morning stiffness. She continues to perform a home exercise routine. She is taking Percocet as needed for severe pain. She denies any other health changes.     Interval History 1/30/2023:  The patient returns to clinic today for follow up of low back and knee pain. She is s/p right genicular RFA on 12/12/2022 and left genicular RFA on 1/9/2023. She reports 60% relief of her knee pain. She reports intermittent bilateral knee pain, this is tolerable at this time. She reports increased low back pain. Her pain is constant and aching in nature. Her leg pain is much improved. Her pain is worse with moving from sitting to standing. She does endorse morning stiffness. She continues to participate in physical therapy and a home  exercise routine. She continues to take Percocet as needed with benefit and without adverse effects. She denies any other health changes.     Interval History 11/22/2022:  The patient returns to clinic today for follow up of low back and knee pain. She continues to report low back pain that radiates into the posterior aspect of both legs to under her feet. Her pain is worse with moving from sitting to standing. She also endorses morning stiffness. She recently started physical therapy. She has completed one session. She continues to report bilateral knee pain. She endorses worsening pain with the cold weather. Her pain is worse with standing and walking. She continues to take Percocet as needed with benefit and without adverse effects. She denies any other health changes. Her pain today is 8/10.    Interval History 10/5/2022:  She returns for follow-up.  Her knees are doing well.  She has some discomfort but not enough to do procedures.  Her main complaint is lumbar spine pain for the past few weeks that is radiating to the dorsal aspect of both lower extremities.  She has no red flag signs/symptoms.  No new bowel or bladder symptomatology.  The pain radiates from the back down to the plantar aspect of both feet.  It is activity related.  Rest helps some but it does not resolve the pain.  She has not been in therapy for a while.  No recent imaging.  No recent weight changes.  Otherwise, no new symptomatology.  She goes regularly to her primary care physician for health maintenance.    Interval History 8/9/2022:  Alivia Thomas presents to the clinic for a follow-up appointment for low back and knee pain. She is s/p right genicular RFA on 5/9/2022 and left genicular RFA on 5/22/2022. She reports 60% relief of her knee pain. She also had panniculectomy on 6/14/2022. She is healing well. She continues to report intermittent low back pain, worse with prolonged activity. She denies any radicular leg pain. Her pain is  worse prolonged standing and walking. She continues to perform a homoe exercise routine. She continues to take Percocet as needed with benefit and without adverse effects. She denies any other health changes. Her pain today is 7/10.    Interval History 4/9/2022:  The patient returns to clinic today for follow-up bilateral L3, 4, 5 RFA last month.  She reports that she is feeling very well following the procedure and reports 60-70% pain relief.  Today, she reports that her bilateral knee pain has continued to worsen, and she would like to go ahead and repeat bilateral genicular RFA as soon as possible.  She denies any new numbness, tingling, weakness, saddle anesthesia or bowel/bladder incontinence.    Interval History 12/28/2021:  The patient returns to clinic today for follow up via virtual visit. She continues to report bilateral knee pain. This pain is worse with prolonged standing and walking. She continues to report low back and buttock pain. She denies any radicular leg pain. She continues to report a home exercise routine. She continues to report benefit with Percocet. She denies any adverse effects. She denies any other health changes.    Pain Disability Index Review:      12/18/2024     9:59 AM 5/29/2024     3:08 PM 10/23/2023     3:37 PM   Last 3 PDI Scores   Pain Disability Index (PDI) [phone]       Pain Medications:  Percocet 10/325 mg TID PRN pain    Opioid Contract: yes     report:  Reviewed and consistent with medication use as prescribed.    Pain Procedures:   steroid inj and visco-supplementation (series of 3) in left knee- not helpful  3/31/14 Bilateral genicular nerve blocks  2/16/16 Bilateral L2,3,4,5 MBB  3/1/16 Bilateral L2,3,4,5 MBB   4/6/16 Left L2,3,4,5 RFA- significant relief  4/20/16 Right L2,3,4,5 RFA- significant relief  10/5/16 Left L2,3,4,5 cooled RFA  10/19/16 Right L2,3,4,5 cooled RFA  4/26/17 Left L2,3,4,5 cooled RFA  5/9/17 Right L2,3,4,5 cooled RFA  12/26/2017- Left L2,3,4,5  cooled RFA  1/9/2018- Right L2,3,4,5 cooled RFA  6/26/18 Left L2,3,4,5 cooled RFA- 60% relief  7/10/18 Right L2,3,4,5 cooled RFA- 60% relief  9/28/18 TPIs- significant relief  12/27/18 Left L2,3,4,5 RFA- 70% relief  1/29/19 Right L2,3,4,5 RFA- 70% relief  6/18/19 Left L2,3,4,5 RFA- 70% relief  7/9/19 Right L2,3,4,5 RFA- 50% relief  12/19/19 Left L2,3,4,5 RFA- 80% relief  12/31/19 Right L2,3,4,5 RFA- 50% relief  3/2/2020- Right genicular nerve block- 70% relief for one month  3/12/20 Left subacromial bursa injection- 90% relief  5/18/2020- Left genicular nerve block- 70%  6/9/2020- Bilateral SI joint injections- 50% relief  10/13/2020- Right genicular RFA- 50% relief   11/3/2020- Left genicular RFA- 50% relief  12/15/2020- Left L2,3,4,5 RFA  12/29/2020- Right L2,3,4,5 RFA  4/26/2021- Left subacromial bursa'  5/11/2021- Bilateral SI joint injections   6/28/2021- Left genicular RFA  7/13/2021- Right genicular RFA  8/24/2021- Right L2,3,4,5 RFA  9/11/2021- Left L2,3,4,5 RFA  3/7/2022- Right L2,3,4,5 RFA  3/21/2022- Left L2,3,4,5 RFA  5/9/2022- Right genicular RFA  5/23/2022- Left genicular RFA  12/12/2022- Right genicular RFA  1/9/2023- Left genicular RFA  3/6/2023- Right L3,4,5 RFA  7/24/23 L5/S1 IL KERI- 50% relief  11/27/2023- Right L3,4,5 RFA  1/22/2024 - Right Genicular - 60% relief  2/12/2024 - Left L3, L4, L5 RFA - 60% relief   5/16/2024 - Bilateral SIJ and GTB - 70% relief    Physical Therapy/Home Exercise: yes    Imaging:   MRI Lumbar Spine 7/12/2023:  COMPARISON:  10/6/22.     FINDINGS:  Alignment: Grade 1 anterolisthesis at L3-L4 and L4-L5.  No evidence for spondylolysis.     Vertebrae: Degenerative endplate changes throughout the lower lumbar spine.  Hemangioma noted within the L5 vertebral body.  No fracture or marrow infiltrative process.     Discs: Moderate disc height loss at L3-S1.  No evidence for discitis.     Cord: Conus terminates at T12-L1 and appears unremarkable.  Cauda equina appears  unremarkable.     Degenerative findings:     T12-L1: No spinal canal stenosis or neuroforaminal narrowing.     L1-L2: Mild facet arthropathy results in mild right neural foraminal narrowing.     L2-L3: Circumferential disc bulge and mild facet arthropathy result in mild effacement of the thecal sac and mild bilateral neural foraminal narrowing.     L3-L4: Uncovering of the intervertebral disc with bulge and severe facet arthropathy result in severe spinal canal stenosis and severe left, moderate right neural foraminal narrowing.     L4-L5: Uncovering of the intervertebral disc with bulge and severe facet arthropathy result in severe spinal canal stenosis and severe left, moderate right neural foraminal narrowing.     L5-S1: Circumferential disc bulge and mild facet arthropathy.  No spinal canal stenosis or neural foraminal narrowing.     Paraspinal muscles & soft tissues: Mild paraspinal muscle atrophy.  Partially visualized markedly enlarged leiomyomatous uterus noted.  There is asymmetric enlargement of the right kidney.     Impression:     1. Multilevel advanced degenerative change of the lumbar spine.  Severe spinal canal stenosis and moderate to severe neural foraminal narrowing noted at L3-L5.  2. Partially visualized markedly enlarged leiomyomatous uterus.  Recommend further evaluation pelvic ultrasound.  3. Asymmetric enlargement of the right kidney.  Recommend further evaluation with retroperitoneal ultrasound.  This report was flagged in Epic as abnormal.    Xray Knee 3/6/2019:  FINDINGS:  Postop bilateral knee replacements.  The the the the irregularity about the left patella with soft tissue calcifications similar.  Small joint effusion.  Some irregularity of the right patella unchanged as well.     IMPRESSION:      Postop bilateral knee replacement with irregularity about the patella as above.    MRI Cervical Spine 4/24/2018:  FINDINGS:  There is reversal of the normal cervical lordosis which could be  related to patient positioning versus muscle spasm.     Cervical vertebral body heights are well maintained without evidence of acute fracture or dislocation.  Marrow signal is unremarkable.     Spinal canal contents are unremarkable.  No abnormal masses or collections.     Individual levels as detailed below:     C2-3: No significant spinal canal stenosis or neuroforaminal narrowing.     C3-4: Facet arthropathy.  No significant spinal canal stenosis or neuroforaminal narrowing.     C4-5: Facet arthropathy.  Small broad-based disc osteophyte complex.  Mild right neuroforaminal narrowing.  Mild spinal canal stenosis.     C5-6: Facet arthropathy.  Uncovertebral joint spurring.  Broad-based posterior disc osteophyte complex.  Mild right neuroforaminal narrowing.  Mild spinal canal stenosis.     C6-7: Facet arthropathy.  No significant spinal canal stenosis or neuroforaminal narrowing.     C7-T1: No significant spinal canal stenosis or neuroforaminal narrowing.     Paraspinal muscles are unremarkable.  Soft tissues are intact.     IMPRESSION:      Mild multilevel cervical spondylosis with mild spinal canal stenosis and mild right neuroforaminal narrowing at C4-5 and C5-6.    Xray Lumbar Spine 9/21/2015:  Results: AP, lateral neutral, lateral flexion , lateral extension, bilateral oblique and spot views.  The alignment of the lumbar spine demonstrates a mild levoscoliosis .  11-mm anterior listhesis of L4 relative to L5 with no translational abnormalities   seen on flexion and extension views.  The vertebral body heights  are well-maintained  , mild disk space narrowing L4-L5 and L5-S1.   Mild anterior and marginal osteophyte formation seen  throughout the lumbar spine  .  The oblique views demonstrate no   definite spondylolysis.  There is facet joint osseous hypertrophy noted at L3-L4 and L4-L5.  IMPRESSION:         The Significant spondylosis of the lumbar spine with grade 1 anterior listhesis L4 relative to  L5.  Facet joint osseous hypertrophy L3-L4 and L4-5.    Allergies:   Review of patient's allergies indicates:   Allergen Reactions    Strawberry Anaphylaxis       Current Medications:   Current Outpatient Medications   Medication Sig Dispense Refill    acetaminophen (TYLENOL) 650 MG TbSR Take 1 tablet (650 mg total) by mouth every 8 (eight) hours as needed (pain). 90 tablet 1    albuterol (PROVENTIL/VENTOLIN HFA) 90 mcg/actuation inhaler INHALE TWO PUFFS INTO LUNGS EVERY 4 TO 6 HOURS AS NEEDED FOR SHORTNESS OF BREATH AND FOR WHEEZING 54 g 3    allopurinoL (ZYLOPRIM) 300 MG tablet Take 1 tablet (300 mg total) by mouth 2 (two) times daily. 180 tablet 3    apixaban (ELIQUIS) 5 mg Tab Take 1 tablet (5 mg total) by mouth 2 (two) times daily. 60 tablet 6    atorvastatin (LIPITOR) 80 MG tablet Take 1 tablet (80 mg total) by mouth once daily. 90 tablet 3    azithromycin (Z-MICKI) 250 MG tablet Two today then one daily thereafter 6 tablet 0    b complex vitamins tablet Take 1 tablet by mouth every other day.       blood pressure monitor (BLOOD PRESSURE KIT) Kit 1 kit by Misc.(Non-Drug; Combo Route) route Daily. Check blood pressure daily 1 each 0    calcium citrate/vitamin D2 (ISIAH-CITRATE ORAL) Take 500 mg by mouth once daily.      celecoxib (CELEBREX) 200 MG capsule 1 tab daily as needed      colchicine (MITIGARE) 0.6 mg Cap One daily as needed for gout flare (Patient taking differently: One daily) 90 capsule 1    cyanocobalamin (VITAMIN B-12) 1000 MCG tablet Take 100 mcg by mouth every morning.      diclofenac sodium (VOLTAREN) 1 % Gel Apply 2 g topically 4 (four) times daily as needed. To painful area on the feet. 500 g 5    fluticasone propionate (FLONASE) 50 mcg/actuation nasal spray 1 SPRAY BY EACH NOSTRIL ROUTE 2 TIMES DAILY AS NEEDED FOR RHINITIS. 48 g 3    indomethacin (INDOCIN) 50 MG capsule Take 1 capsule (50 mg total) by mouth 2 (two) times daily as needed (with meals). 30 capsule 0    LIDOcaine (XYLOCAINE) 5 %  Oint ointment APPLY TOPICALLY AS NEEDED. 35.44 g 6    LIDOcaine-prilocaine (EMLA) cream APPLY TOPICALLY AS NEEDED. FOR FOOT PAIN 30 g 2    metoprolol succinate (TOPROL-XL) 25 MG 24 hr tablet TAKE 1/2 TABLET (12.5 MG TOTAL) BY MOUTH ONCE DAILY.      midodrine (PROAMATINE) 5 MG Tab One daily      MULTIVIT-IRON-MIN-FOLIC ACID 3,500-18-0.4 UNIT-MG-MG ORAL CHEW Take 1 tablet by mouth 2 (two) times daily.       nystatin (MYCOSTATIN) powder Apply topically 2 (two) times daily. 60 g 3    oxybutynin (DITROPAN-XL) 5 MG TR24 Take 1 tablet (5 mg total) by mouth once daily. 90 tablet 3    oxyCODONE-acetaminophen (PERCOCET)  mg per tablet Take 1 tablet by mouth every 6 to 8 hours as needed for Pain (for severe pain). 105 tablet 0    prednisoLONE acetate (PRED FORTE) 1 % DrpS Place 1 drop into the right eye 3 (three) times daily.      semaglutide (OZEMPIC) 1 mg/dose (4 mg/3 mL) Inject 1 mg into the skin every 7 days. 3 mL 11    senna-docusate 8.6-50 mg (SENNA WITH DOCUSATE SODIUM) 8.6-50 mg per tablet Take 1 tablet by mouth once daily. 30 tablet 0    tiZANidine (ZANAFLEX) 4 MG tablet TAKE 1 TABLET (4 MG TOTAL) BY MOUTH EVERY 8 (EIGHT) HOURS AS NEEDED (MUSCLE PAIN). 90 tablet 2    urea (CARMOL) 40 % Crea Apply topically once daily. To dry skin on the feet. 120 g 5    vitamin D 185 MG Tab Take 5,000 mg by mouth every morning.       FLUoxetine 40 MG capsule Take 1 capsule (40 mg total) by mouth once daily. 90 capsule 3     No current facility-administered medications for this visit.     Facility-Administered Medications Ordered in Other Visits   Medication Dose Route Frequency Provider Last Rate Last Admin    0.9%  NaCl infusion   Intravenous Continuous Lina Wagner MD 25 mL/hr at 12/15/20 1506 25 mL/hr at 12/15/20 1506    0.9%  NaCl infusion   Intravenous Continuous Ciro Chung MD        0.9%  NaCl infusion   Intravenous Continuous Santos Barron MD           REVIEW OF SYSTEMS:    GENERAL:  No weight loss, malaise  or fevers.  HEENT:  Negative for frequent or significant headaches.  NECK:  Negative for lumps, goiter, pain and significant neck swelling.  RESPIRATORY:  Negative for cough, wheezing or shortness of breath.  CARDIOVASCULAR:  Negative for chest pain, leg swelling or palpitations. HTN  GI:  Negative for abdominal discomfort, blood in stools or black stools or change in bowel habits. GERD  MUSCULOSKELETAL:  See HPI.  SKIN:  Negative for lesions, rash, and itching.  PSYCH:  Negative for sleep disturbance, mood disorder and recent psychosocial stressors.  HEMATOLOGY/LYMPHOLOGY:  Negative for prolonged bleeding, bruising easily or swollen nodes.  NEURO:   No history of headaches, syncope, paralysis, seizures or tremors.  ENDO: Diabetes  All other reviewed and negative other than HPI.    Past Medical History:  Past Medical History:   Diagnosis Date    Acute right MCA stroke 01/04/2024    Allergy     Anemia     Asthma     Bilateral shoulder bursitis     Cervical stenosis of spine     Chronic pain     DDD (degenerative disc disease), cervical     Depression 01/28/2019    Diabetes mellitus type II     DJD (degenerative joint disease) of knee 06/19/2014    Facet arthritis of lumbar region 12/17/2015    Facet syndrome 12/17/2015    GERD (gastroesophageal reflux disease)     Heartburn     Hyperlipidemia     Hypertension     Morbid obesity     Neuromuscular disorder     PADDY (obstructive sleep apnea)     Paroxysmal atrial fibrillation 03/19/2024    Proteinuria     Right carpal tunnel syndrome     Sacroiliitis 06/13/2018    Sacroiliitis     Sleep apnea     Uterine fibroid        Past Surgical History:  Past Surgical History:   Procedure Laterality Date    CARPAL TUNNEL RELEASE      CARPAL TUNNEL RELEASE  1980s    left    CARPAL TUNNEL RELEASE  2012    right    CATARACT EXTRACTION W/  INTRAOCULAR LENS IMPLANT Left 08/08/2023    DR. STOKES    CATARACT EXTRACTION W/  INTRAOCULAR LENS IMPLANT Right 08/29/2023         COLONOSCOPY N/A 06/15/2020    Procedure: COLONOSCOPY;  Surgeon: Jc Koo MD;  Location: [facility] (4TH FLR);  Service: Endoscopy;  Laterality: N/A;  [facility]  pt updated on drop off location and no visitor policy-    EPIDURAL STEROID INJECTION N/A 07/24/2023    Procedure: INJECTION, STEROID, EPIDURAL, L5/S1 SOONER DATE;  Surgeon: Israel Hurtado MD;  Location: [facility] PAIN MGT;  Service: Pain Management;  Laterality: N/A;    ESOPHAGOGASTRODUODENOSCOPY N/A 03/27/2019    Procedure: EGD (ESOPHAGOGASTRODUODENOSCOPY);  Surgeon: Robbin Bar MD;  Location: [facility] (2ND FLR);  Service: Endoscopy;  Laterality: N/A;  BMI 61.3/2nd floor case/svn    INJECTION OF JOINT Bilateral 06/09/2020    Procedure: INJECTION, JOINT, BILATERAL SI;  Surgeon: Lina Wagner MD;  Location: [facility] PAIN T;  Service: Pain Management;  Laterality: Bilateral;  B/L SI Joint Injection    INJECTION OF JOINT Bilateral 05/11/2021    Procedure: INJECTION, JOINT, SACROILIAC (SI) need consent;  Surgeon: Lina Wagner MD;  Location: [facility] PAIN MGT;  Service: Pain Management;  Laterality: Bilateral;    INJECTION OF JOINT Bilateral 5/16/2024    Procedure: INJECTION, JOINT BILATERAL SI AND GTB;  Surgeon: Israel Hurtado MD;  Location: [facility] MGT;  Service: Pain Management;  Laterality: Bilateral;  [phone]  2 WK F/U BENJI    JOINT REPLACEMENT Bilateral     with 2 revisions on rt    KNEE SURGERY  03/2010    orthroscope    KNEE SURGERY  06/19/2014    left TKR    LAPAROSCOPIC SLEEVE GASTRECTOMY N/A 08/28/2019    Procedure: GASTRECTOMY, SLEEVE, LAPAROSCOPIC with intraop EGD;  Surgeon: Heriberto Clements MD;  Location: [facility] OR 2ND FLR;  Service: General;  Laterality: N/A;    PANNICULECTOMY N/A 06/14/2022    Procedure: PANNICULECTOMY;  Surgeon: Rhett Gray MD;  Location: [facility] OR 2ND FLR;  Service: Plastics;  Laterality: N/A;    RADIOFREQUENCY ABLATION Right 07/09/2019    Procedure:  RADIOFREQUENCY ABLATION;  Surgeon: Lina Wagner MD;  Location: [facility] PAIN MGT;  Service: Pain Management;  Laterality: Right;  RIGHT RFA L2,3,4,5  2 of 2    RADIOFREQUENCY ABLATION Left 12/19/2019    Procedure: RADIOFREQUENCY ABLATION, LEFT L2-L3-L4-L5 MEDIAL BRANCH 1 OF 2;  Surgeon: Lina Wagner MD;  Location: [facility] PAIN MGT;  Service: Pain Management;  Laterality: Left;    RADIOFREQUENCY ABLATION Right 12/31/2019    Procedure: RADIOFREQUENCY ABLATION, RIGHT L2-L3-L4-L5  2 OF 2;  Surgeon: Lina Wagner MD;  Location: [facility] PAIN MGT;  Service: Pain Management;  Laterality: Right;    RADIOFREQUENCY ABLATION Right 10/13/2020    Procedure: RADIOFREQUENCY ABLATION, Genicular Cooled 1 of 2;  Surgeon: Lina Wagner MD;  Location: [facility] PAIN MGT;  Service: Pain Management;  Laterality: Right;    RADIOFREQUENCY ABLATION Left 11/03/2020    Procedure: RADIOFREQUENCY ABLATION, GENICULAR COOLED  2 OF 2;  Surgeon: Lina Wagner MD;  Location: [facility] PAIN MGT;  Service: Pain Management;  Laterality: Left;    RADIOFREQUENCY ABLATION Left 12/15/2020    Procedure: RADIOFREQUENCY ABLATION LEFT L2,3,4,5 1 of 2;  Surgeon: Lina Wagner MD;  Location: [facility] PAIN MGT;  Service: Pain Management;  Laterality: Left;    RADIOFREQUENCY ABLATION Right 12/29/2020    Procedure: RADIOFREQUENCY ABLATION RIGHT L2,3,4,5 2 of 2;  Surgeon: Lina Wagner MD;  Location: [facility] PAIN MGT;  Service: Pain Management;  Laterality: Right;    RADIOFREQUENCY ABLATION Left 06/29/2021    Procedure: RADIOFREQUENCY ABLATION GENICULAR COOLED;  Surgeon: Lina Wagner MD;  Location: [facility] PAIN MGT;  Service: Pain Management;  Laterality: Left;  1 of 2    RADIOFREQUENCY ABLATION Right 07/13/2021    Procedure: RADIOFREQUENCY ABLATION GENICULAR;  Surgeon: Lina Wagner MD;  Location: [facility] PAIN MGT;  Service: Pain Management;  Laterality: Right;  2 of 2    RADIOFREQUENCY ABLATION Right 08/24/2021    Procedure: RADIOFREQUENCY ABLATION,  L2-L3-L4-L5 MEDIAL BRANCH 1 OF 2;  Surgeon: Lina Wagner MD;  [facility];  Service: Pain Management;  Laterality: Right;    RADIOFREQUENCY ABLATION Left 09/11/2021    Procedure: RADIOFREQUENCY ABLATION, L2-L3-L4-L5 MEDIAL BR ANCH 2 OF 2;  Surgeon: Lina Wagner MD;  Location: [facility] PAIN MGT;  Service: Pain Management;  Laterality: Left;    RADIOFREQUENCY ABLATION Right 03/07/2022    Procedure: RADIOFREQUENCY ABLATION RIGHT L3,4,5 1 of 2, needs consent;  Surgeon: Israel Hurtado MD;  Location: [facility] PAIN MGT;  Service: Pain Management;  Laterality: Right;    RADIOFREQUENCY ABLATION Left 03/21/2022    Procedure: RADIOFREQUENCY ABLATION RIGHT L3,4,5 2 of 2, needs consent;  Surgeon: Israel Hurtado MD;  Location: [facility] PAIN MGT;  Service: Pain Management;  Laterality: Left;    RADIOFREQUENCY ABLATION Right 05/09/2022    Procedure: RADIOFREQUENCY ABLATION RIGHT GENICULAR COOLED 1 of 2;  Surgeon: Israel Hurtado MD;  Location: [facility] PAIN MGT;  Service: Pain Management;  Laterality: Right;    RADIOFREQUENCY ABLATION Left 05/23/2022    Procedure: RADIOFREQUENCY ABLATION LEFT GENICULAR COOLED  2 of 2;  Surgeon: Israel Hurtado MD;  Location: [facility] PAIN MGT;  Service: Pain Management;  Laterality: Left;    RADIOFREQUENCY ABLATION Right 12/12/2022    Procedure: RADIOFREQUENCY ABLATION RIGHT GENICULAR COOLED  ONE OF TWO  NEEDS CONSENT;  Surgeon: Israel Hurtado MD;  Location: [facility] PAIN MGT;  Service: Pain Management;  Laterality: Right;    RADIOFREQUENCY ABLATION Left 01/09/2023    Procedure: RADIOFREQUENCY ABLATION LEFT GENICULAR COOLED  TWO OF TWO NEEDS CONSENT;  Surgeon: Israel Hurtado MD;  Location: [facility] PAIN MGT;  Service: Pain Management;  Laterality: Left;    RADIOFREQUENCY ABLATION Right 03/06/2023    Procedure: RADIOFREQUENCY ABLATION RIGHT L3,L4,L5;  Surgeon: Israel Hurtado MD;  Location: [facility] PAIN MGT;  Service: Pain Management;  Laterality: Right;    RADIOFREQUENCY ABLATION Left 05/22/2023    Procedure:  RADIOFREQUENCY ABLATION LEFT L3,L4,L5;  Surgeon: Israel Hurtado MD;  Location: [facility] PAIN MGT;  Service: Pain Management;  Laterality: Left;  4/3 LM TO R/S    RADIOFREQUENCY ABLATION Right 11/27/2023    Procedure: RADIOFREQUENCY ABLATION RIGHT L3, 4, 5 1 OF 3;  Surgeon: Israel Hurtado MD;  Location: [facility] PAIN MGT;  Service: Pain Management;  Laterality: Right;    RADIOFREQUENCY ABLATION Right 01/22/2024    Procedure: RADIOFREQUENCY ABLATION RIGHT GENICULAR 3 NERVES 3 OF 3;  Surgeon: Israel Hurtado MD;  Location: [facility] PAIN MGT;  Service: Pain Management;  Laterality: Right;    RADIOFREQUENCY ABLATION Left 02/12/2024    Procedure: RADIOFREQUENCY ABLATION LEFT L3, 4, 5;  Surgeon: Israel Hurtado MD;  Location: [facility] PAIN MGT;  Service: Pain Management;  Laterality: Left;  [phone]    RADIOFREQUENCY ABLATION OF LUMBAR MEDIAL BRANCH NERVE AT SINGLE LEVEL Left 06/26/2018    Procedure: RADIOFREQUENCY ABLATION, NERVE, MEDIAL BRANCH, LUMBAR, 1 LEVEL;  Surgeon: Lina Wagner MD;  Location: [facility] PAIN MGT;  Service: Pain Management;  Laterality: Left;  Left Cooled RFA @ L2,3,4,5  [phone]  with Sedation    1 of 2    RADIOFREQUENCY ABLATION OF LUMBAR MEDIAL BRANCH NERVE AT SINGLE LEVEL Right 07/10/2018    Procedure: RADIOFREQUENCY ABLATION, NERVE, MEDIAL BRANCH, LUMBAR, 1 LEVEL;  Surgeon: Lina Wagner MD;  Location: [facility] PAIN MGT;  Service: Pain Management;  Laterality: Right;  RIght Cooled RFA @ L2,3,4,5  [phone] with Sedation    2 of 2    REVISION OF KNEE ARTHROPLASTY Right 7/18/2024    Procedure: REVISION, ARTHROPLASTY, KNEE: RIGHT;  Surgeon: Theodore Swan MD;  Location: [facility] OR 2ND FLR;  Service: Orthopedics;  Laterality: Right;    TRIGGER FINGER RELEASE Right 2017    TRIGGER FINGER RELEASE Left 07/29/2019    Procedure: RELEASE, TRIGGER FINGER, Left Thumb;  Surgeon: Velma Garcia MD;  Location: [facility] OR;  Service: Orthopedics;  Laterality: Left;  Local w/ MAC       Family History:  Family History    Problem Relation Name Age of Onset    Diabetes Mother      Cataracts Mother      Diabetes Father      Cataracts Father      Hypertension Sister      Diabetes Sister      No Known Problems Sister      Cancer Sister          lymphoma     Coronary artery disease Brother      Diabetes Brother      Diabetes Brother      Hypotension Brother      Kidney failure Brother      Hypotension Brother      Amblyopia Neg Hx      Blindness Neg Hx      Glaucoma Neg Hx      Macular degeneration Neg Hx      Retinal detachment Neg Hx      Strabismus Neg Hx      Stroke Neg Hx      Thyroid disease Neg Hx      Anesthesia problems Neg Hx         Social History:  Social History     Socioeconomic History    Marital status:    Tobacco Use    Smoking status: Never     Passive exposure: Never    Smokeless tobacco: Never   Substance and Sexual Activity    Alcohol use: Not Currently     Comment: occasionally     Drug use: No    Sexual activity: Yes     Partners: Female     Birth control/protection: None   Social History Narrative    Disabled. The patient is the youngest of 6 siblings.  Lives with single-sex partner.                  Social Drivers of Health     Financial Resource Strain: Low Risk  (7/19/2024)    Overall Financial Resource Strain (CARDIA)     Difficulty of Paying Living Expenses: Not very hard   Food Insecurity: No Food Insecurity (7/19/2024)    Hunger Vital Sign     Worried About Running Out of Food in the Last Year: Never true     Ran Out of Food in the Last Year: Never true   Transportation Needs: No Transportation Needs (7/19/2024)    TRANSPORTATION NEEDS     Transportation : No   Physical Activity: Inactive (7/19/2024)    Exercise Vital Sign     Days of Exercise per Week: 0 days     Minutes of Exercise per Session: 0 min   Stress: No Stress Concern Present (7/19/2024)    [employer] [place] [employer] - Occupational Stress Questionnaire     Feeling of Stress : Not at all   Housing Stability: Low Risk   (2024)    Housing Stability Vital Sign     Unable to Pay for Housing in the Last Year: No     Homeless in the Last Year: No       OBJECTIVE:      Vitals:    24 0958 24 0959   BP: 133/81    Pulse: 67    Temp: 97.6 °F (36.4 °C)    Weight: 119.3 kg (263 lb 0.1 oz)    PainSc:   8   8   PainLoc: Back           PHYSICAL EXAMINATION :    GENERAL: Well appearing, in no acute distress, alert and oriented x3.   PSYCH:  Mood and affect appropriate.   SKIN: Skin color, texture, turgor normal, no rashes or lesions. Well-healing right knee scar.   HEAD/FACE:  Normocephalic, atraumatic.   CV: Rate regular.  PULM: No evidence of respiratory difficulty, symmetric chest rise.  BACK: Negative SLR bilaterally, but positive to posterior thigh tightness and pain. There is pain with palpation over midline lumbar spine. Limited ROM with pain on flexion and extension. Mild tenderness over bilateral SIJ. Positive bilateral facet loading. +Millgrams bilaterally. Negative FABERE. Negative FADIR.  EXTREMITIES: No edema.  Mild TTP over bilateral GTB.   MUSCULOSKELETAL: 4/5 strength in right ankle with plantar and dorsiflexion. 4/5 strength in left ankle with plantar and dorsiflexion. 5/5 strength with right knee flexion and extension. 5/5 strength with left knee flexion and extension. 5/5 strength in right EHL, 5/5 strength in left EHL.  NEURO:  Plantar response are downgoing. No clonus. Normal sensation.   GAIT: Antalgic    ASSESSMENT: 59 y.o. year old female with low back and knee pain, consistent with the followin. Lumbar spondylosis  [facility] Drug Screen      2. Degeneration of intervertebral disc of lumbar region with discogenic back pain  [facility] Drug Screen      3. Primary osteoarthritis of both knees  [facility] Drug Screen      4. Lumbosacral radiculopathy                PLAN:   We discussed with the patient the assessment and recommendations. The following is the plan we agreed on:   - Previous imaging  reviewed.   - Continue Acetaminophen 650 mg q8 hours PRN. Do not take more than three times per day.   - Currently on Percocet 10/325 mg QID PRN. Last fill 11/21/2024. Will decrease patient to three times a day. Refill provided for Percocet 10/325 mg TID PRN, #105. At next visit we shall decrease to #90 at next visit  - She would like to get back down to Percocet TID PRN.   - The patient is here today for a refill of current pain medications and they believe these provide effective pain control and improvements in quality of life.  The patient notes no serious side effects, and feels the benefits outweigh the risks.  The patient was reminded of the pain contract that they signed previously as well as the risks and benefits of the medication including possible death.  The updated [place] [employer] prescription monitoring program was reviewed, and the patient has been found to be compliant with current treatment plan.    - UDS 5/29/2024 reviewed and appropriate. Repeat UDS done today.   - Continue HEP and PT. Continue aquatic therapy.   - Will schedule patient for L5/S1 ILESI to assist with her DDD and lumbar radiculopathy. Patient agreed to this plan.   - RTC two weeks after injection with SUNDEEP       The above plan and management options were discussed at length with patient. Patient is in agreement with the above and verbalized understanding.     Gerard Severino MD  12/18/2024  I have personally taken the history and examined this patient and agree with the fellow's note as stated above.

## 2024-12-18 NOTE — PROGRESS NOTES
Chronic Pain-Established Note (Follow up visit)           SUBJECTIVE:    Interval History 12/18/2024:  Patient returns to clinic for follow up of low back pain and knee pain. She has completed physical therapy for her knees and continues to do HEP as well as swimming. She continues to take Percocet four times a day, she states she was taking three to four times a day prior to surgery, and has not been able to reduce since the surgery. She denies any recent health changes. She denies recent falls or trauma. She denies new onset fever/night sweats, urinary incontinence, bowel incontinence, significant weight changes, significant motor weakness or changes, or loss of sensations.    Her back pain is focused to the middle low back, the pain is axial in nature mainly, but can radiate down the anterior thighs to the knees. Described as sharp/shooting and aching throbbing, exacerbated by leaning back, mitigated by rest. Pain is rated as 7/10. She inquires about repeat lumbar RFA, last one is in 2/12/2024, she endorses 2.5 months of good relief.       Interval History 9/13/2024:  Alivia Thomas returns to clinic virtually for follow-up after right TKA and post-operative pain. She states she has been doing well in physical therapy biweekly and has been doing home exercises most days. She states she is healing well and hoping to progress to aquatic therapy once finished with physical therapy for her knee. She has been on Percocet 10/325 4 times per day for the last month which has helped her tolerate and progress in physical therapy. She has about 1 week left of this last prescription. She believes she could probably tolerate decreasing her dose slightly in hopes of getting down to her previous dose of Percocet 10/325 TID PRN. She denies any recent health changes. She denies recent falls or trauma. She denies new onset fever/night sweats, urinary incontinence, bowel incontinence, significant weight changes, significant  motor weakness or changes, or loss of sensations. Her pain today is 7/10.     Interval History 8/16/2024:  Alivia Thomas returns to clinic for follow-up of chronic lower back pain. She is s/p right TKA revision on 7/18/2024. She continues Percocet with mild relief. She has been having increased pain since her TKA and with associated physical therapy. She is wondering if her dose could be temporarily increased. She also takes Tylenol arthritis with good relief. She denies any perceived side effects. She denies recent falls or trauma. She denies new onset fever/night sweats, urinary incontinence, bowel incontinence, significant weight changes, significant motor weakness or changes, or loss of sensations. Her pain today is 8/10.     Interval History 5/29/2024:  Alivia Thomas returns to clinic s/p Bilateral SIJ and GTB injections on 5/16/2024.  She reports 70% relief. She is able to complete ADLs with limited pain and her quality of life is improved. She takes Percocet 10/325 TID PRN with good relief. She denies any perceived side effects. She is going to restart water aerobics at her local pool.  She is having a right TKA revision in July.  After healing from that surgery is complete, she would like to start aquatic therapy for her back. The patient denies fever/night sweats, urinary incontinence, bowel incontinence, significant weight changes, significant motor weakness or changes, or loss of sensations. Her pain today is 4/10.    Interval History 4/29/2024:  Alivia Thomas returns today for follow-up of back pain and right knee pain.  She denies any radicular pain.  She notices bilateral lower back pain with pain into the buttocks and lateral thighs. She takes Percocet TID PRN with good relief.  She denies any perceived side effects. Her pharmacy has been out of the medication and she has been out since 3/10/2024.  The patient denies fever/night sweats, urinary incontinence, bowel incontinence, significant  weight changes, significant motor weakness or changes, or loss of sensations. Her pain today is 9/10.    Interval History 1/9/2024:  The patient returns to clinic today for follow up of back pain via virtual visit. She is s/p right L3,4,5 RFA on 11/27/2023. She reports 40-50% relief at this time. She did not have left sided RFA due to illness. Of note, she had a stroke last week. She began having a headache, left sided facial drooping, and slurring of her speech. She did go to the ER where she received TPA. She reports mild weakness into her left arm. She continues to report low back pain. She denies any radicular leg pain. Her pain is worse with activity. She also reports bilateral knee pain, worse with standing and walking. She is scheduled for genicular RFA in the next few weeks. She will reschedule left lumbar RFA. She is taking Percocet as needed with benefit. She denies any adverse effects. She denies any other health changes.      Interval History 10/24/23:  Alivia Thomas returns today for follow-up. Since last seen, they report their pain has been worsening. She last received a toradol shot instead of an epidural as she did not wish to have an epidural.  Today, she is here for follow-up with me to evaluate.  She is requesting to increase her oxycodone to 4 times a day instead of 3 times a day.  I had a long discussion with her and advised her we either change the medication but not increase it, try repeating radiofrequency ablation since it worked well for her and/or spinal cord stimulator.  Yesterday, surgery was another alternative for managing her pain.  She had a CT scan that we went over the results.  She has multilevel degenerative disease with severe facet degenerative disease and multilevel spinal and foraminal stenosis.  Yesterday, she declined the surgery.  Today she is open to repeating the radiofrequency ablation but she does not want to change the medication.  Previously, the radiofrequency  ablation lasted several months up to a year.  The last time she said it lasted about 3-4 month than the pain started coming back gradually over the following 3-4 months.  Overall, she still had good relief.  She is complaining of the right knee as well.  Much less pain on the left knee.  The radicular component of her back pain is in the dorsal thighs bilaterally worse on the right negligible on the left.  No new bowel or bladder symptomatology.  No fever, chills or night sweats.    Interval History 10/23/23: Alivia Thomas returns for follow up. This is a patient of Dr. Hurtado. Her visit with [place] was cancelled last week so she was placed on my schedule today. At her last visit she was having pain radiating down her legs, but did not want to have an epidural. They performed a Toradol shot (short term relief) and referred her to Neurosurgery. She saw Dr. Bartlett today who said either she will need SCS or decompression. On review of her CT and MRI, she has severe facet arthropathy and severe canal stenosis at L3/4 and L4/5. She notes being able to walk <1 block, after which she must sit down. She reports 8-10/10 pain and this is interfering with her life significantly.     Interval History 9/18/2023:  The patient presents today to request an injection for increased back pain. She has been having worsened pain over the past couple of weeks. She does not wish to have another KREI at this time. She is having lower back pain with radiation down the back of both legs, R>L. She has associated numbness and tingling as well as subjective weakness. No bowel or bladder incontinence. She has not seen neurosurgery. Her pain today is 10/10.    Interval History 8/14/2023:  The patient is here for follow up after procedure for back pain. She is now s/p L5/S1 IL KERI with about 50% relief. She still has lower back pain with radiation down the back of the legs, stopping at the knees. She has associated numbness to lower extremities.  Her pain worsens with walking, bending and reaching upward. She has been working on home exercises and stretches without much benefit. She has some benefit with medication as needed.  She report Her pain today is 9/10.    Interval History 7/10/2023:  The patient is here today for evaluation of increased lower back pain. She has a long-standing history of back pain which is mainly axial. She has had worsened symptoms over the past couple of weeks, most severe over the past 3 days. The pain now radiates down the back of both legs with burning and numbness. She finds it difficult to stand or walk for any length of time. No bowel or bladder incontinence. No fever or malaise. Medications are helping somewhat. She continues with home PT exercises as previously taught in PT. Her pain today is 10/10.    Interval History 4/10/2023:  The patient returns to clinic today for follow up of low back and knee pain via virtual visit. She is s/p right L3,4,5 RFA on 3/6/2023. Her left side procedure was rescheduled due to illness. This is scheduled for May 1st. She reports some relief of her right sided low back pain. She continues to report left sided low back pain. She denies any radicular leg pain. She does endorse morning stiffness. She continues to perform a home exercise routine. She is taking Percocet as needed for severe pain. She denies any other health changes.     Interval History 1/30/2023:  The patient returns to clinic today for follow up of low back and knee pain. She is s/p right genicular RFA on 12/12/2022 and left genicular RFA on 1/9/2023. She reports 60% relief of her knee pain. She reports intermittent bilateral knee pain, this is tolerable at this time. She reports increased low back pain. Her pain is constant and aching in nature. Her leg pain is much improved. Her pain is worse with moving from sitting to standing. She does endorse morning stiffness. She continues to participate in physical therapy and a home  exercise routine. She continues to take Percocet as needed with benefit and without adverse effects. She denies any other health changes.     Interval History 11/22/2022:  The patient returns to clinic today for follow up of low back and knee pain. She continues to report low back pain that radiates into the posterior aspect of both legs to under her feet. Her pain is worse with moving from sitting to standing. She also endorses morning stiffness. She recently started physical therapy. She has completed one session. She continues to report bilateral knee pain. She endorses worsening pain with the cold weather. Her pain is worse with standing and walking. She continues to take Percocet as needed with benefit and without adverse effects. She denies any other health changes. Her pain today is 8/10.    Interval History 10/5/2022:  She returns for follow-up.  Her knees are doing well.  She has some discomfort but not enough to do procedures.  Her main complaint is lumbar spine pain for the past few weeks that is radiating to the dorsal aspect of both lower extremities.  She has no red flag signs/symptoms.  No new bowel or bladder symptomatology.  The pain radiates from the back down to the plantar aspect of both feet.  It is activity related.  Rest helps some but it does not resolve the pain.  She has not been in therapy for a while.  No recent imaging.  No recent weight changes.  Otherwise, no new symptomatology.  She goes regularly to her primary care physician for health maintenance.    Interval History 8/9/2022:  Alivia Thomas presents to the clinic for a follow-up appointment for low back and knee pain. She is s/p right genicular RFA on 5/9/2022 and left genicular RFA on 5/22/2022. She reports 60% relief of her knee pain. She also had panniculectomy on 6/14/2022. She is healing well. She continues to report intermittent low back pain, worse with prolonged activity. She denies any radicular leg pain. Her pain is  worse prolonged standing and walking. She continues to perform a homoe exercise routine. She continues to take Percocet as needed with benefit and without adverse effects. She denies any other health changes. Her pain today is 7/10.    Interval History 4/9/2022:  The patient returns to clinic today for follow-up bilateral L3, 4, 5 RFA last month.  She reports that she is feeling very well following the procedure and reports 60-70% pain relief.  Today, she reports that her bilateral knee pain has continued to worsen, and she would like to go ahead and repeat bilateral genicular RFA as soon as possible.  She denies any new numbness, tingling, weakness, saddle anesthesia or bowel/bladder incontinence.    Interval History 12/28/2021:  The patient returns to clinic today for follow up via virtual visit. She continues to report bilateral knee pain. This pain is worse with prolonged standing and walking. She continues to report low back and buttock pain. She denies any radicular leg pain. She continues to report a home exercise routine. She continues to report benefit with Percocet. She denies any adverse effects. She denies any other health changes.    Pain Disability Index Review:      12/18/2024     9:59 AM 5/29/2024     3:08 PM 10/23/2023     3:37 PM   Last 3 PDI Scores   Pain Disability Index (PDI) [phone]       Pain Medications:  Percocet 10/325 mg TID PRN pain    Opioid Contract: yes     report:  Reviewed and consistent with medication use as prescribed.    Pain Procedures:   steroid inj and visco-supplementation (series of 3) in left knee- not helpful  3/31/14 Bilateral genicular nerve blocks  2/16/16 Bilateral L2,3,4,5 MBB  3/1/16 Bilateral L2,3,4,5 MBB   4/6/16 Left L2,3,4,5 RFA- significant relief  4/20/16 Right L2,3,4,5 RFA- significant relief  10/5/16 Left L2,3,4,5 cooled RFA  10/19/16 Right L2,3,4,5 cooled RFA  4/26/17 Left L2,3,4,5 cooled RFA  5/9/17 Right L2,3,4,5 cooled RFA  12/26/2017- Left L2,3,4,5  cooled RFA  1/9/2018- Right L2,3,4,5 cooled RFA  6/26/18 Left L2,3,4,5 cooled RFA- 60% relief  7/10/18 Right L2,3,4,5 cooled RFA- 60% relief  9/28/18 TPIs- significant relief  12/27/18 Left L2,3,4,5 RFA- 70% relief  1/29/19 Right L2,3,4,5 RFA- 70% relief  6/18/19 Left L2,3,4,5 RFA- 70% relief  7/9/19 Right L2,3,4,5 RFA- 50% relief  12/19/19 Left L2,3,4,5 RFA- 80% relief  12/31/19 Right L2,3,4,5 RFA- 50% relief  3/2/2020- Right genicular nerve block- 70% relief for one month  3/12/20 Left subacromial bursa injection- 90% relief  5/18/2020- Left genicular nerve block- 70%  6/9/2020- Bilateral SI joint injections- 50% relief  10/13/2020- Right genicular RFA- 50% relief   11/3/2020- Left genicular RFA- 50% relief  12/15/2020- Left L2,3,4,5 RFA  12/29/2020- Right L2,3,4,5 RFA  4/26/2021- Left subacromial bursa'  5/11/2021- Bilateral SI joint injections   6/28/2021- Left genicular RFA  7/13/2021- Right genicular RFA  8/24/2021- Right L2,3,4,5 RFA  9/11/2021- Left L2,3,4,5 RFA  3/7/2022- Right L2,3,4,5 RFA  3/21/2022- Left L2,3,4,5 RFA  5/9/2022- Right genicular RFA  5/23/2022- Left genicular RFA  12/12/2022- Right genicular RFA  1/9/2023- Left genicular RFA  3/6/2023- Right L3,4,5 RFA  7/24/23 L5/S1 IL KERI- 50% relief  11/27/2023- Right L3,4,5 RFA  1/22/2024 - Right Genicular - 60% relief  2/12/2024 - Left L3, L4, L5 RFA - 60% relief   5/16/2024 - Bilateral SIJ and GTB - 70% relief    Physical Therapy/Home Exercise: yes    Imaging:   MRI Lumbar Spine 7/12/2023:  COMPARISON:  10/6/22.     FINDINGS:  Alignment: Grade 1 anterolisthesis at L3-L4 and L4-L5.  No evidence for spondylolysis.     Vertebrae: Degenerative endplate changes throughout the lower lumbar spine.  Hemangioma noted within the L5 vertebral body.  No fracture or marrow infiltrative process.     Discs: Moderate disc height loss at L3-S1.  No evidence for discitis.     Cord: Conus terminates at T12-L1 and appears unremarkable.  Cauda equina appears  unremarkable.     Degenerative findings:     T12-L1: No spinal canal stenosis or neuroforaminal narrowing.     L1-L2: Mild facet arthropathy results in mild right neural foraminal narrowing.     L2-L3: Circumferential disc bulge and mild facet arthropathy result in mild effacement of the thecal sac and mild bilateral neural foraminal narrowing.     L3-L4: Uncovering of the intervertebral disc with bulge and severe facet arthropathy result in severe spinal canal stenosis and severe left, moderate right neural foraminal narrowing.     L4-L5: Uncovering of the intervertebral disc with bulge and severe facet arthropathy result in severe spinal canal stenosis and severe left, moderate right neural foraminal narrowing.     L5-S1: Circumferential disc bulge and mild facet arthropathy.  No spinal canal stenosis or neural foraminal narrowing.     Paraspinal muscles & soft tissues: Mild paraspinal muscle atrophy.  Partially visualized markedly enlarged leiomyomatous uterus noted.  There is asymmetric enlargement of the right kidney.     Impression:     1. Multilevel advanced degenerative change of the lumbar spine.  Severe spinal canal stenosis and moderate to severe neural foraminal narrowing noted at L3-L5.  2. Partially visualized markedly enlarged leiomyomatous uterus.  Recommend further evaluation pelvic ultrasound.  3. Asymmetric enlargement of the right kidney.  Recommend further evaluation with retroperitoneal ultrasound.  This report was flagged in Epic as abnormal.    Xray Knee 3/6/2019:  FINDINGS:  Postop bilateral knee replacements.  The the the the irregularity about the left patella with soft tissue calcifications similar.  Small joint effusion.  Some irregularity of the right patella unchanged as well.     IMPRESSION:      Postop bilateral knee replacement with irregularity about the patella as above.    MRI Cervical Spine 4/24/2018:  FINDINGS:  There is reversal of the normal cervical lordosis which could be  related to patient positioning versus muscle spasm.     Cervical vertebral body heights are well maintained without evidence of acute fracture or dislocation.  Marrow signal is unremarkable.     Spinal canal contents are unremarkable.  No abnormal masses or collections.     Individual levels as detailed below:     C2-3: No significant spinal canal stenosis or neuroforaminal narrowing.     C3-4: Facet arthropathy.  No significant spinal canal stenosis or neuroforaminal narrowing.     C4-5: Facet arthropathy.  Small broad-based disc osteophyte complex.  Mild right neuroforaminal narrowing.  Mild spinal canal stenosis.     C5-6: Facet arthropathy.  Uncovertebral joint spurring.  Broad-based posterior disc osteophyte complex.  Mild right neuroforaminal narrowing.  Mild spinal canal stenosis.     C6-7: Facet arthropathy.  No significant spinal canal stenosis or neuroforaminal narrowing.     C7-T1: No significant spinal canal stenosis or neuroforaminal narrowing.     Paraspinal muscles are unremarkable.  Soft tissues are intact.     IMPRESSION:      Mild multilevel cervical spondylosis with mild spinal canal stenosis and mild right neuroforaminal narrowing at C4-5 and C5-6.    Xray Lumbar Spine 9/21/2015:  Results: AP, lateral neutral, lateral flexion , lateral extension, bilateral oblique and spot views.  The alignment of the lumbar spine demonstrates a mild levoscoliosis .  11-mm anterior listhesis of L4 relative to L5 with no translational abnormalities   seen on flexion and extension views.  The vertebral body heights  are well-maintained  , mild disk space narrowing L4-L5 and L5-S1.   Mild anterior and marginal osteophyte formation seen  throughout the lumbar spine  .  The oblique views demonstrate no   definite spondylolysis.  There is facet joint osseous hypertrophy noted at L3-L4 and L4-L5.  IMPRESSION:         The Significant spondylosis of the lumbar spine with grade 1 anterior listhesis L4 relative to  L5.  Facet joint osseous hypertrophy L3-L4 and L4-5.    Allergies:   Review of patient's allergies indicates:   Allergen Reactions    Strawberry Anaphylaxis       Current Medications:   Current Outpatient Medications   Medication Sig Dispense Refill    acetaminophen (TYLENOL) 650 MG TbSR Take 1 tablet (650 mg total) by mouth every 8 (eight) hours as needed (pain). 90 tablet 1    albuterol (PROVENTIL/VENTOLIN HFA) 90 mcg/actuation inhaler INHALE TWO PUFFS INTO LUNGS EVERY 4 TO 6 HOURS AS NEEDED FOR SHORTNESS OF BREATH AND FOR WHEEZING 54 g 3    allopurinoL (ZYLOPRIM) 300 MG tablet Take 1 tablet (300 mg total) by mouth 2 (two) times daily. 180 tablet 3    apixaban (ELIQUIS) 5 mg Tab Take 1 tablet (5 mg total) by mouth 2 (two) times daily. 60 tablet 6    atorvastatin (LIPITOR) 80 MG tablet Take 1 tablet (80 mg total) by mouth once daily. 90 tablet 3    azithromycin (Z-MICKI) 250 MG tablet Two today then one daily thereafter 6 tablet 0    b complex vitamins tablet Take 1 tablet by mouth every other day.       blood pressure monitor (BLOOD PRESSURE KIT) Kit 1 kit by Misc.(Non-Drug; Combo Route) route Daily. Check blood pressure daily 1 each 0    calcium citrate/vitamin D2 (ISIAH-CITRATE ORAL) Take 500 mg by mouth once daily.      celecoxib (CELEBREX) 200 MG capsule 1 tab daily as needed      colchicine (MITIGARE) 0.6 mg Cap One daily as needed for gout flare (Patient taking differently: One daily) 90 capsule 1    cyanocobalamin (VITAMIN B-12) 1000 MCG tablet Take 100 mcg by mouth every morning.      diclofenac sodium (VOLTAREN) 1 % Gel Apply 2 g topically 4 (four) times daily as needed. To painful area on the feet. 500 g 5    fluticasone propionate (FLONASE) 50 mcg/actuation nasal spray 1 SPRAY BY EACH NOSTRIL ROUTE 2 TIMES DAILY AS NEEDED FOR RHINITIS. 48 g 3    indomethacin (INDOCIN) 50 MG capsule Take 1 capsule (50 mg total) by mouth 2 (two) times daily as needed (with meals). 30 capsule 0    LIDOcaine (XYLOCAINE) 5 %  Oint ointment APPLY TOPICALLY AS NEEDED. 35.44 g 6    LIDOcaine-prilocaine (EMLA) cream APPLY TOPICALLY AS NEEDED. FOR FOOT PAIN 30 g 2    metoprolol succinate (TOPROL-XL) 25 MG 24 hr tablet TAKE 1/2 TABLET (12.5 MG TOTAL) BY MOUTH ONCE DAILY.      midodrine (PROAMATINE) 5 MG Tab One daily      MULTIVIT-IRON-MIN-FOLIC ACID 3,500-18-0.4 UNIT-MG-MG ORAL CHEW Take 1 tablet by mouth 2 (two) times daily.       nystatin (MYCOSTATIN) powder Apply topically 2 (two) times daily. 60 g 3    oxybutynin (DITROPAN-XL) 5 MG TR24 Take 1 tablet (5 mg total) by mouth once daily. 90 tablet 3    oxyCODONE-acetaminophen (PERCOCET)  mg per tablet Take 1 tablet by mouth every 6 to 8 hours as needed for Pain (for severe pain). 105 tablet 0    prednisoLONE acetate (PRED FORTE) 1 % DrpS Place 1 drop into the right eye 3 (three) times daily.      semaglutide (OZEMPIC) 1 mg/dose (4 mg/3 mL) Inject 1 mg into the skin every 7 days. 3 mL 11    senna-docusate 8.6-50 mg (SENNA WITH DOCUSATE SODIUM) 8.6-50 mg per tablet Take 1 tablet by mouth once daily. 30 tablet 0    tiZANidine (ZANAFLEX) 4 MG tablet TAKE 1 TABLET (4 MG TOTAL) BY MOUTH EVERY 8 (EIGHT) HOURS AS NEEDED (MUSCLE PAIN). 90 tablet 2    urea (CARMOL) 40 % Crea Apply topically once daily. To dry skin on the feet. 120 g 5    vitamin D 185 MG Tab Take 5,000 mg by mouth every morning.       FLUoxetine 40 MG capsule Take 1 capsule (40 mg total) by mouth once daily. 90 capsule 3     No current facility-administered medications for this visit.     Facility-Administered Medications Ordered in Other Visits   Medication Dose Route Frequency Provider Last Rate Last Admin    0.9%  NaCl infusion   Intravenous Continuous Lina Wagner MD 25 mL/hr at 12/15/20 1506 25 mL/hr at 12/15/20 1506    0.9%  NaCl infusion   Intravenous Continuous Ciro Chung MD        0.9%  NaCl infusion   Intravenous Continuous Santos Barron MD           REVIEW OF SYSTEMS:    GENERAL:  No weight loss, malaise  or fevers.  HEENT:  Negative for frequent or significant headaches.  NECK:  Negative for lumps, goiter, pain and significant neck swelling.  RESPIRATORY:  Negative for cough, wheezing or shortness of breath.  CARDIOVASCULAR:  Negative for chest pain, leg swelling or palpitations. HTN  GI:  Negative for abdominal discomfort, blood in stools or black stools or change in bowel habits. GERD  MUSCULOSKELETAL:  See HPI.  SKIN:  Negative for lesions, rash, and itching.  PSYCH:  Negative for sleep disturbance, mood disorder and recent psychosocial stressors.  HEMATOLOGY/LYMPHOLOGY:  Negative for prolonged bleeding, bruising easily or swollen nodes.  NEURO:   No history of headaches, syncope, paralysis, seizures or tremors.  ENDO: Diabetes  All other reviewed and negative other than HPI.    Past Medical History:  Past Medical History:   Diagnosis Date    Acute right MCA stroke 01/04/2024    Allergy     Anemia     Asthma     Bilateral shoulder bursitis     Cervical stenosis of spine     Chronic pain     DDD (degenerative disc disease), cervical     Depression 01/28/2019    Diabetes mellitus type II     DJD (degenerative joint disease) of knee 06/19/2014    Facet arthritis of lumbar region 12/17/2015    Facet syndrome 12/17/2015    GERD (gastroesophageal reflux disease)     Heartburn     Hyperlipidemia     Hypertension     Morbid obesity     Neuromuscular disorder     PADDY (obstructive sleep apnea)     Paroxysmal atrial fibrillation 03/19/2024    Proteinuria     Right carpal tunnel syndrome     Sacroiliitis 06/13/2018    Sacroiliitis     Sleep apnea     Uterine fibroid        Past Surgical History:  Past Surgical History:   Procedure Laterality Date    CARPAL TUNNEL RELEASE      CARPAL TUNNEL RELEASE  1980s    left    CARPAL TUNNEL RELEASE  2012    right    CATARACT EXTRACTION W/  INTRAOCULAR LENS IMPLANT Left 08/08/2023    DR. STOKES    CATARACT EXTRACTION W/  INTRAOCULAR LENS IMPLANT Right 08/29/2023         COLONOSCOPY N/A 06/15/2020    Procedure: COLONOSCOPY;  Surgeon: Jc Koo MD;  Location: [facility] (4TH FLR);  Service: Endoscopy;  Laterality: N/A;  [facility]  pt updated on drop off location and no visitor policy-    EPIDURAL STEROID INJECTION N/A 07/24/2023    Procedure: INJECTION, STEROID, EPIDURAL, L5/S1 SOONER DATE;  Surgeon: Israel Hurtado MD;  Location: [facility] PAIN MGT;  Service: Pain Management;  Laterality: N/A;    ESOPHAGOGASTRODUODENOSCOPY N/A 03/27/2019    Procedure: EGD (ESOPHAGOGASTRODUODENOSCOPY);  Surgeon: Robbin Bar MD;  Location: [facility] (2ND FLR);  Service: Endoscopy;  Laterality: N/A;  BMI 61.3/2nd floor case/svn    INJECTION OF JOINT Bilateral 06/09/2020    Procedure: INJECTION, JOINT, BILATERAL SI;  Surgeon: Lina Wagner MD;  Location: [facility] PAIN T;  Service: Pain Management;  Laterality: Bilateral;  B/L SI Joint Injection    INJECTION OF JOINT Bilateral 05/11/2021    Procedure: INJECTION, JOINT, SACROILIAC (SI) need consent;  Surgeon: Lina Wagner MD;  Location: [facility] PAIN MGT;  Service: Pain Management;  Laterality: Bilateral;    INJECTION OF JOINT Bilateral 5/16/2024    Procedure: INJECTION, JOINT BILATERAL SI AND GTB;  Surgeon: Israel Hurtado MD;  Location: [facility] MGT;  Service: Pain Management;  Laterality: Bilateral;  [phone]  2 WK F/U BENJI    JOINT REPLACEMENT Bilateral     with 2 revisions on rt    KNEE SURGERY  03/2010    orthroscope    KNEE SURGERY  06/19/2014    left TKR    LAPAROSCOPIC SLEEVE GASTRECTOMY N/A 08/28/2019    Procedure: GASTRECTOMY, SLEEVE, LAPAROSCOPIC with intraop EGD;  Surgeon: Heriberto Clements MD;  Location: [facility] OR 2ND FLR;  Service: General;  Laterality: N/A;    PANNICULECTOMY N/A 06/14/2022    Procedure: PANNICULECTOMY;  Surgeon: Rhett Gray MD;  Location: [facility] OR 2ND FLR;  Service: Plastics;  Laterality: N/A;    RADIOFREQUENCY ABLATION Right 07/09/2019    Procedure:  RADIOFREQUENCY ABLATION;  Surgeon: Lina Wagner MD;  Location: [facility] PAIN MGT;  Service: Pain Management;  Laterality: Right;  RIGHT RFA L2,3,4,5  2 of 2    RADIOFREQUENCY ABLATION Left 12/19/2019    Procedure: RADIOFREQUENCY ABLATION, LEFT L2-L3-L4-L5 MEDIAL BRANCH 1 OF 2;  Surgeon: Lina Wagner MD;  Location: [facility] PAIN MGT;  Service: Pain Management;  Laterality: Left;    RADIOFREQUENCY ABLATION Right 12/31/2019    Procedure: RADIOFREQUENCY ABLATION, RIGHT L2-L3-L4-L5  2 OF 2;  Surgeon: Lina Wagner MD;  Location: [facility] PAIN MGT;  Service: Pain Management;  Laterality: Right;    RADIOFREQUENCY ABLATION Right 10/13/2020    Procedure: RADIOFREQUENCY ABLATION, Genicular Cooled 1 of 2;  Surgeon: Lina Wagner MD;  Location: [facility] PAIN MGT;  Service: Pain Management;  Laterality: Right;    RADIOFREQUENCY ABLATION Left 11/03/2020    Procedure: RADIOFREQUENCY ABLATION, GENICULAR COOLED  2 OF 2;  Surgeon: Lina Wagner MD;  Location: [facility] PAIN MGT;  Service: Pain Management;  Laterality: Left;    RADIOFREQUENCY ABLATION Left 12/15/2020    Procedure: RADIOFREQUENCY ABLATION LEFT L2,3,4,5 1 of 2;  Surgeon: Lina Wagner MD;  Location: [facility] PAIN MGT;  Service: Pain Management;  Laterality: Left;    RADIOFREQUENCY ABLATION Right 12/29/2020    Procedure: RADIOFREQUENCY ABLATION RIGHT L2,3,4,5 2 of 2;  Surgeon: Lina Wagner MD;  Location: [facility] PAIN MGT;  Service: Pain Management;  Laterality: Right;    RADIOFREQUENCY ABLATION Left 06/29/2021    Procedure: RADIOFREQUENCY ABLATION GENICULAR COOLED;  Surgeon: Lina Wagner MD;  Location: [facility] PAIN MGT;  Service: Pain Management;  Laterality: Left;  1 of 2    RADIOFREQUENCY ABLATION Right 07/13/2021    Procedure: RADIOFREQUENCY ABLATION GENICULAR;  Surgeon: Lina Wagner MD;  Location: [facility] PAIN MGT;  Service: Pain Management;  Laterality: Right;  2 of 2    RADIOFREQUENCY ABLATION Right 08/24/2021    Procedure: RADIOFREQUENCY ABLATION,  L2-L3-L4-L5 MEDIAL BRANCH 1 OF 2;  Surgeon: Lina Wagner MD;  [facility];  Service: Pain Management;  Laterality: Right;    RADIOFREQUENCY ABLATION Left 09/11/2021    Procedure: RADIOFREQUENCY ABLATION, L2-L3-L4-L5 MEDIAL BR ANCH 2 OF 2;  Surgeon: Lina Wagner MD;  Location: [facility] PAIN MGT;  Service: Pain Management;  Laterality: Left;    RADIOFREQUENCY ABLATION Right 03/07/2022    Procedure: RADIOFREQUENCY ABLATION RIGHT L3,4,5 1 of 2, needs consent;  Surgeon: Israel Hurtado MD;  Location: [facility] PAIN MGT;  Service: Pain Management;  Laterality: Right;    RADIOFREQUENCY ABLATION Left 03/21/2022    Procedure: RADIOFREQUENCY ABLATION RIGHT L3,4,5 2 of 2, needs consent;  Surgeon: Israel Hurtado MD;  Location: [facility] PAIN MGT;  Service: Pain Management;  Laterality: Left;    RADIOFREQUENCY ABLATION Right 05/09/2022    Procedure: RADIOFREQUENCY ABLATION RIGHT GENICULAR COOLED 1 of 2;  Surgeon: Israel Hurtado MD;  Location: [facility] PAIN MGT;  Service: Pain Management;  Laterality: Right;    RADIOFREQUENCY ABLATION Left 05/23/2022    Procedure: RADIOFREQUENCY ABLATION LEFT GENICULAR COOLED  2 of 2;  Surgeon: Israel Hurtado MD;  Location: [facility] PAIN MGT;  Service: Pain Management;  Laterality: Left;    RADIOFREQUENCY ABLATION Right 12/12/2022    Procedure: RADIOFREQUENCY ABLATION RIGHT GENICULAR COOLED  ONE OF TWO  NEEDS CONSENT;  Surgeon: Israel Hurtado MD;  Location: [facility] PAIN MGT;  Service: Pain Management;  Laterality: Right;    RADIOFREQUENCY ABLATION Left 01/09/2023    Procedure: RADIOFREQUENCY ABLATION LEFT GENICULAR COOLED  TWO OF TWO NEEDS CONSENT;  Surgeon: Israel Hurtado MD;  Location: [facility] PAIN MGT;  Service: Pain Management;  Laterality: Left;    RADIOFREQUENCY ABLATION Right 03/06/2023    Procedure: RADIOFREQUENCY ABLATION RIGHT L3,L4,L5;  Surgeon: Israel Hurtado MD;  Location: [facility] PAIN MGT;  Service: Pain Management;  Laterality: Right;    RADIOFREQUENCY ABLATION Left 05/22/2023    Procedure:  RADIOFREQUENCY ABLATION LEFT L3,L4,L5;  Surgeon: Israel Hurtado MD;  Location: [facility] PAIN MGT;  Service: Pain Management;  Laterality: Left;  4/3 LM TO R/S    RADIOFREQUENCY ABLATION Right 11/27/2023    Procedure: RADIOFREQUENCY ABLATION RIGHT L3, 4, 5 1 OF 3;  Surgeon: Israel Hurtado MD;  Location: [facility] PAIN MGT;  Service: Pain Management;  Laterality: Right;    RADIOFREQUENCY ABLATION Right 01/22/2024    Procedure: RADIOFREQUENCY ABLATION RIGHT GENICULAR 3 NERVES 3 OF 3;  Surgeon: Israel Hurtado MD;  Location: [facility] PAIN MGT;  Service: Pain Management;  Laterality: Right;    RADIOFREQUENCY ABLATION Left 02/12/2024    Procedure: RADIOFREQUENCY ABLATION LEFT L3, 4, 5;  Surgeon: Israel Hurtado MD;  Location: [facility] PAIN MGT;  Service: Pain Management;  Laterality: Left;  [phone]    RADIOFREQUENCY ABLATION OF LUMBAR MEDIAL BRANCH NERVE AT SINGLE LEVEL Left 06/26/2018    Procedure: RADIOFREQUENCY ABLATION, NERVE, MEDIAL BRANCH, LUMBAR, 1 LEVEL;  Surgeon: Lina Wagner MD;  Location: [facility] PAIN MGT;  Service: Pain Management;  Laterality: Left;  Left Cooled RFA @ L2,3,4,5  [phone]  with Sedation    1 of 2    RADIOFREQUENCY ABLATION OF LUMBAR MEDIAL BRANCH NERVE AT SINGLE LEVEL Right 07/10/2018    Procedure: RADIOFREQUENCY ABLATION, NERVE, MEDIAL BRANCH, LUMBAR, 1 LEVEL;  Surgeon: Lina Wagner MD;  Location: [facility] PAIN MGT;  Service: Pain Management;  Laterality: Right;  RIght Cooled RFA @ L2,3,4,5  [phone] with Sedation    2 of 2    REVISION OF KNEE ARTHROPLASTY Right 7/18/2024    Procedure: REVISION, ARTHROPLASTY, KNEE: RIGHT;  Surgeon: Theodore Swan MD;  Location: [facility] OR 2ND FLR;  Service: Orthopedics;  Laterality: Right;    TRIGGER FINGER RELEASE Right 2017    TRIGGER FINGER RELEASE Left 07/29/2019    Procedure: RELEASE, TRIGGER FINGER, Left Thumb;  Surgeon: Velma Garcia MD;  Location: [facility] OR;  Service: Orthopedics;  Laterality: Left;  Local w/ MAC       Family History:  Family History    Problem Relation Name Age of Onset    Diabetes Mother      Cataracts Mother      Diabetes Father      Cataracts Father      Hypertension Sister      Diabetes Sister      No Known Problems Sister      Cancer Sister          lymphoma     Coronary artery disease Brother      Diabetes Brother      Diabetes Brother      Hypotension Brother      Kidney failure Brother      Hypotension Brother      Amblyopia Neg Hx      Blindness Neg Hx      Glaucoma Neg Hx      Macular degeneration Neg Hx      Retinal detachment Neg Hx      Strabismus Neg Hx      Stroke Neg Hx      Thyroid disease Neg Hx      Anesthesia problems Neg Hx         Social History:  Social History     Socioeconomic History    Marital status:    Tobacco Use    Smoking status: Never     Passive exposure: Never    Smokeless tobacco: Never   Substance and Sexual Activity    Alcohol use: Not Currently     Comment: occasionally     Drug use: No    Sexual activity: Yes     Partners: Female     Birth control/protection: None   Social History Narrative    Disabled. The patient is the youngest of 6 siblings.  Lives with single-sex partner.                  Social Drivers of Health     Financial Resource Strain: Low Risk  (7/19/2024)    Overall Financial Resource Strain (CARDIA)     Difficulty of Paying Living Expenses: Not very hard   Food Insecurity: No Food Insecurity (7/19/2024)    Hunger Vital Sign     Worried About Running Out of Food in the Last Year: Never true     Ran Out of Food in the Last Year: Never true   Transportation Needs: No Transportation Needs (7/19/2024)    TRANSPORTATION NEEDS     Transportation : No   Physical Activity: Inactive (7/19/2024)    Exercise Vital Sign     Days of Exercise per Week: 0 days     Minutes of Exercise per Session: 0 min   Stress: No Stress Concern Present (7/19/2024)    [employer] [place] [employer] - Occupational Stress Questionnaire     Feeling of Stress : Not at all   Housing Stability: Low Risk   (2024)    Housing Stability Vital Sign     Unable to Pay for Housing in the Last Year: No     Homeless in the Last Year: No       OBJECTIVE:      Vitals:    24 0958 24 0959   BP: 133/81    Pulse: 67    Temp: 97.6 °F (36.4 °C)    Weight: 119.3 kg (263 lb 0.1 oz)    PainSc:   8   8   PainLoc: Back           PHYSICAL EXAMINATION :    GENERAL: Well appearing, in no acute distress, alert and oriented x3.   PSYCH:  Mood and affect appropriate.   SKIN: Skin color, texture, turgor normal, no rashes or lesions. Well-healing right knee scar.   HEAD/FACE:  Normocephalic, atraumatic.   CV: Rate regular.  PULM: No evidence of respiratory difficulty, symmetric chest rise.  BACK: Negative SLR bilaterally, but positive to posterior thigh tightness and pain. There is pain with palpation over midline lumbar spine. Limited ROM with pain on flexion and extension. Mild tenderness over bilateral SIJ. Positive bilateral facet loading. +Millgrams bilaterally. Negative FABERE. Negative FADIR.  EXTREMITIES: No edema.  Mild TTP over bilateral GTB.   MUSCULOSKELETAL: 4/5 strength in right ankle with plantar and dorsiflexion. 4/5 strength in left ankle with plantar and dorsiflexion. 5/5 strength with right knee flexion and extension. 5/5 strength with left knee flexion and extension. 5/5 strength in right EHL, 5/5 strength in left EHL.  NEURO:  Plantar response are downgoing. No clonus. Normal sensation.   GAIT: Antalgic    ASSESSMENT: 59 y.o. year old female with low back and knee pain, consistent with the followin. Lumbar spondylosis  [facility] Drug Screen      2. Degeneration of intervertebral disc of lumbar region with discogenic back pain  [facility] Drug Screen      3. Primary osteoarthritis of both knees  [facility] Drug Screen      4. Lumbosacral radiculopathy                PLAN:   We discussed with the patient the assessment and recommendations. The following is the plan we agreed on:   - Previous imaging  reviewed.   - Continue Acetaminophen 650 mg q8 hours PRN. Do not take more than three times per day.   - Currently on Percocet 10/325 mg QID PRN. Last fill 11/21/2024. Will decrease patient to three times a day. Refill provided for Percocet 10/325 mg TID PRN, #105. At next visit we shall decrease to #90 at next visit  - She would like to get back down to Percocet TID PRN.   - The patient is here today for a refill of current pain medications and they believe these provide effective pain control and improvements in quality of life.  The patient notes no serious side effects, and feels the benefits outweigh the risks.  The patient was reminded of the pain contract that they signed previously as well as the risks and benefits of the medication including possible death.  The updated [place] Board [employer] prescription monitoring program was reviewed, and the patient has been found to be compliant with current treatment plan.    - UDS 5/29/2024 reviewed and appropriate. Repeat UDS done today.   - Continue HEP and PT. Continue aquatic therapy.   - Will schedule patient for L5/S1 ILESI to assist with her DDD and lumbar radiculopathy. Patient agreed to this plan.   - RTC two weeks after injection with SUNDEEP       The above plan and management options were discussed at length with patient. Patient is in agreement with the above and verbalized understanding.     Gerard Severino MD  12/18/2024  I have personally taken the history and examined this patient and agree with the fellow's note as stated above.

## 2024-12-20 ENCOUNTER — TELEPHONE (OUTPATIENT)
Dept: PAIN MEDICINE | Facility: CLINIC | Age: 59
End: 2024-12-20
Payer: MEDICARE

## 2024-12-20 NOTE — TELEPHONE ENCOUNTER
----- Message from Taisha sent at 12/20/2024  2:50 PM CST -----  Regarding: oxyCODONE-acetaminophen (PERCOCET)  mg per tablet  Type:  RX Refill Request    Who Called: pt    Refill or New Rx:refill    RX Name and Strength:oxyCODONE-acetaminophen (PERCOCET)  mg per tablet    How is the patient currently taking it? (ex. 1XDay):    Is this a 30 day or 90 day RX:    Preferred Pharmacy with phone number:[facility] - [address] [facility]   Phone: [phone]  Fax: [phone]          Local or Mail Order:local    Ordering Provider:    Would the patient rather a call back or a response via [facility]? call    Best Call Back Number:[phone]    Additional Information: medication was sent to wrong pharmacy, please patient when this is recieved

## 2024-12-20 NOTE — TELEPHONE ENCOUNTER
----- Message from Angela Forrester sent at 12/20/2024  2:13 PM CST -----  Regarding: Medication  Refill  Request                Reply in [facility]: NO      Please refill the medication listed below. Please call the patient  [phone] (K)      Medication     oxyCODONE-acetaminophen (PERCOCET)  mg per tablet       Preferred Pharmacy:  [facility] [address]   Phone: [phone]  Fax: [phone]

## 2024-12-20 NOTE — TELEPHONE ENCOUNTER
Staff spoke with patient and informed her that her medication is at the pharmacy, patient states it was sent to the pharmacy by provider but she's going to just go and pick it up. Patient also states it be sent to correct pharmacy from now on.

## 2024-12-20 NOTE — TELEPHONE ENCOUNTER
"----- Message from Maggie sent at 12/20/2024  2:21 PM CST -----  Regarding: Prescription  "Type:  Patient Call Back    Who Called:PT    What is the reqeust in detail:Requesting call back in regards to pt oxyCODONE-acetaminophen (PERCOCET)  mg per tablet pt states medication went to wrong pharmacy. Please advise    Can the clinic reply by [facility]?no     Best Call Back Number:[phone]      Additional Information:[facility] - [place], [place] - [facility]        Pt need call back ASAP  "

## 2024-12-24 ENCOUNTER — PATIENT MESSAGE (OUTPATIENT)
Dept: PAIN MEDICINE | Facility: OTHER | Age: 59
End: 2024-12-24
Payer: MEDICARE

## 2025-01-02 ENCOUNTER — TELEPHONE (OUTPATIENT)
Dept: PAIN MEDICINE | Facility: OTHER | Age: 60
End: 2025-01-02
Payer: MEDICARE

## 2025-01-02 NOTE — TELEPHONE ENCOUNTER
----- Message from Clarisse Richardson MD sent at 12/28/2024 12:52 PM CST -----  Regarding: RE: Request to hold Eliquis  Yes - ok to hold eliquis for 3 days prior thanks..  ----- Message -----  From: Marilin Klein LPN  Sent: 12/18/2024   2:21 PM CST  To: Clarisse Richardson MD  Subject: Request to hold Eliquis                          Good afternoon,    Patient will be scheduled to have a procedure with Dr. Hurtado, L5/S1 Interlaminar Epidural Steroid Injection . Staff is requesting to hold Eliquis for 3 days prior to procedure. Please advise.    Thank you,  Marilin

## 2025-01-03 ENCOUNTER — PATIENT MESSAGE (OUTPATIENT)
Dept: INTERNAL MEDICINE | Facility: CLINIC | Age: 60
End: 2025-01-03
Payer: MEDICARE

## 2025-01-03 DIAGNOSIS — Z96.651 S/P REVISION OF TOTAL KNEE, RIGHT: ICD-10-CM

## 2025-01-04 NOTE — TELEPHONE ENCOUNTER
Refill Routing Note   Medication(s) are not appropriate for processing by [facility] for the following reason(s):        Outside of protocol    [facility] action(s):  Route               Appointments  past 12m or future 3m with PCP    Date Provider   Last Visit   8/19/2024 Clarisse Richardson MD   Next Visit   Visit date not found Clarisse Richardson MD   ED visits in past 90 days: 0        Note composed:4:18 PM 01/04/2025

## 2025-01-06 ENCOUNTER — PATIENT MESSAGE (OUTPATIENT)
Dept: BARIATRICS | Facility: CLINIC | Age: 60
End: 2025-01-06
Payer: MEDICARE

## 2025-01-06 ENCOUNTER — PATIENT MESSAGE (OUTPATIENT)
Dept: ADMINISTRATIVE | Facility: OTHER | Age: 60
End: 2025-01-06
Payer: MEDICARE

## 2025-01-08 ENCOUNTER — PATIENT MESSAGE (OUTPATIENT)
Dept: ADMINISTRATIVE | Facility: OTHER | Age: 60
End: 2025-01-08
Payer: MEDICARE

## 2025-01-08 DIAGNOSIS — Z96.651 S/P REVISION OF TOTAL KNEE, RIGHT: ICD-10-CM

## 2025-01-08 NOTE — TELEPHONE ENCOUNTER
----- Message from Beatris sent at 1/8/2025 12:50 PM CST -----  Contact: Pt  [phone]  Requesting an RX refill or new RX.    Is this a refill or new RX: Refill    RX name and strength (copy/paste from chart):  acetaminophen (TYLENOL) 650 MG TbSR    Is this a 30 day or 90 day RX:     Pharmacy name and phone # (copy/paste from chart):[facility]   Phone: [phone]  Fax: [phone]            The doctors have asked that we provide their patients with the following 2 reminders -- prescription refills can take up to 72 hours, and a friendly reminder that in the future you can use your [facility] account to request refills: yes

## 2025-01-09 ENCOUNTER — PATIENT MESSAGE (OUTPATIENT)
Dept: PAIN MEDICINE | Facility: OTHER | Age: 60
End: 2025-01-09
Payer: MEDICARE

## 2025-01-09 PROBLEM — M54.17 LUMBOSACRAL RADICULOPATHY: Status: ACTIVE | Noted: 2025-01-09

## 2025-01-11 RX ORDER — DEXTROMETHORPHAN HYDROBROMIDE, GUAIFENESIN 5; 100 MG/5ML; MG/5ML
650 LIQUID ORAL EVERY 8 HOURS PRN
Qty: 90 TABLET | Refills: 1 | Status: SHIPPED | OUTPATIENT
Start: 2025-01-11 | End: 2025-01-20 | Stop reason: SDUPTHER

## 2025-01-11 RX ORDER — DEXTROMETHORPHAN HYDROBROMIDE, GUAIFENESIN 5; 100 MG/5ML; MG/5ML
650 LIQUID ORAL EVERY 8 HOURS PRN
Qty: 90 TABLET | Refills: 1 | OUTPATIENT
Start: 2025-01-11

## 2025-01-13 ENCOUNTER — OFFICE VISIT (OUTPATIENT)
Dept: PODIATRY | Facility: CLINIC | Age: 60
End: 2025-01-13
Payer: MEDICARE

## 2025-01-13 ENCOUNTER — HOSPITAL ENCOUNTER (OUTPATIENT)
Facility: OTHER | Age: 60
Discharge: HOME | End: 2025-01-13
Attending: ANESTHESIOLOGY | Admitting: ANESTHESIOLOGY
Payer: MEDICARE

## 2025-01-13 VITALS
SYSTOLIC BLOOD PRESSURE: 155 MMHG | BODY MASS INDEX: 40.82 KG/M2 | HEART RATE: 71 BPM | RESPIRATION RATE: 18 BRPM | HEIGHT: 65 IN | WEIGHT: 245 LBS | TEMPERATURE: 98 F | OXYGEN SATURATION: 99 % | DIASTOLIC BLOOD PRESSURE: 73 MMHG

## 2025-01-13 VITALS
SYSTOLIC BLOOD PRESSURE: 128 MMHG | HEART RATE: 78 BPM | BODY MASS INDEX: 40.81 KG/M2 | HEIGHT: 65 IN | WEIGHT: 244.94 LBS | DIASTOLIC BLOOD PRESSURE: 80 MMHG

## 2025-01-13 DIAGNOSIS — M47.816 SPONDYLOSIS OF LUMBAR REGION WITHOUT MYELOPATHY OR RADICULOPATHY: ICD-10-CM

## 2025-01-13 DIAGNOSIS — M25.562 CHRONIC PAIN OF BOTH KNEES: ICD-10-CM

## 2025-01-13 DIAGNOSIS — M79.672 BILATERAL FOOT PAIN: Chronic | ICD-10-CM

## 2025-01-13 DIAGNOSIS — E11.49 TYPE II DIABETES MELLITUS WITH NEUROLOGICAL MANIFESTATIONS: Chronic | ICD-10-CM

## 2025-01-13 DIAGNOSIS — Z79.01 CHRONIC ANTICOAGULATION: ICD-10-CM

## 2025-01-13 DIAGNOSIS — G89.29 CHRONIC PAIN: ICD-10-CM

## 2025-01-13 DIAGNOSIS — G89.29 CHRONIC PAIN OF BOTH KNEES: ICD-10-CM

## 2025-01-13 DIAGNOSIS — M51.369 DDD (DEGENERATIVE DISC DISEASE), LUMBAR: ICD-10-CM

## 2025-01-13 DIAGNOSIS — B35.1 DERMATOPHYTOSIS OF NAIL: ICD-10-CM

## 2025-01-13 DIAGNOSIS — M54.17 LUMBOSACRAL RADICULOPATHY: Primary | ICD-10-CM

## 2025-01-13 DIAGNOSIS — Z96.651 S/P REVISION OF TOTAL KNEE, RIGHT: ICD-10-CM

## 2025-01-13 DIAGNOSIS — E11.9 ENCOUNTER FOR DIABETIC FOOT EXAM: Primary | ICD-10-CM

## 2025-01-13 DIAGNOSIS — G89.4 CHRONIC PAIN SYNDROME: ICD-10-CM

## 2025-01-13 DIAGNOSIS — M17.0 PRIMARY OSTEOARTHRITIS OF BOTH KNEES: ICD-10-CM

## 2025-01-13 DIAGNOSIS — M25.561 CHRONIC PAIN OF BOTH KNEES: ICD-10-CM

## 2025-01-13 DIAGNOSIS — M79.671 BILATERAL FOOT PAIN: Chronic | ICD-10-CM

## 2025-01-13 LAB — POCT GLUCOSE: 93 MG/DL (ref 70–110)

## 2025-01-13 PROCEDURE — 62323 NJX INTERLAMINAR LMBR/SAC: CPT | Mod: ,,, | Performed by: ANESTHESIOLOGY

## 2025-01-13 PROCEDURE — 11721 DEBRIDE NAIL 6 OR MORE: CPT | Mod: Q9,PBBFAC,PN | Performed by: PODIATRIST

## 2025-01-13 PROCEDURE — 99213 OFFICE O/P EST LOW 20 MIN: CPT | Mod: PBBFAC,PN,25 | Performed by: PODIATRIST

## 2025-01-13 PROCEDURE — 62323 NJX INTERLAMINAR LMBR/SAC: CPT | Performed by: ANESTHESIOLOGY

## 2025-01-13 PROCEDURE — 25500020 PHARM REV CODE 255: Performed by: ANESTHESIOLOGY

## 2025-01-13 PROCEDURE — 63600175 PHARM REV CODE 636 W HCPCS: Performed by: ANESTHESIOLOGY

## 2025-01-13 PROCEDURE — 99214 OFFICE O/P EST MOD 30 MIN: CPT | Mod: 25,S$PBB,, | Performed by: PODIATRIST

## 2025-01-13 PROCEDURE — 99999 PR PBB SHADOW E&M-EST. PATIENT-LVL III: CPT | Mod: PBBFAC,,, | Performed by: PODIATRIST

## 2025-01-13 RX ORDER — DEXAMETHASONE SODIUM PHOSPHATE 10 MG/ML
INJECTION INTRAMUSCULAR; INTRAVENOUS
Status: DISCONTINUED | OUTPATIENT
Start: 2025-01-13 | End: 2025-01-13 | Stop reason: HOSPADM

## 2025-01-13 RX ORDER — SODIUM CHLORIDE 9 MG/ML
INJECTION, SOLUTION INTRAVENOUS CONTINUOUS
Status: DISCONTINUED | OUTPATIENT
Start: 2025-01-13 | End: 2025-01-13 | Stop reason: HOSPADM

## 2025-01-13 RX ORDER — MIDAZOLAM HYDROCHLORIDE 1 MG/ML
INJECTION INTRAMUSCULAR; INTRAVENOUS
Status: DISCONTINUED | OUTPATIENT
Start: 2025-01-13 | End: 2025-01-13 | Stop reason: HOSPADM

## 2025-01-13 RX ORDER — INDOMETHACIN 50 MG/1
50 CAPSULE ORAL 2 TIMES DAILY PRN
Qty: 30 CAPSULE | Refills: 0 | Status: SHIPPED | OUTPATIENT
Start: 2025-01-13

## 2025-01-13 RX ORDER — LIDOCAINE HYDROCHLORIDE 10 MG/ML
INJECTION, SOLUTION EPIDURAL; INFILTRATION; INTRACAUDAL; PERINEURAL
Status: DISCONTINUED | OUTPATIENT
Start: 2025-01-13 | End: 2025-01-13 | Stop reason: HOSPADM

## 2025-01-13 RX ORDER — LIDOCAINE HYDROCHLORIDE 20 MG/ML
INJECTION, SOLUTION INFILTRATION; PERINEURAL
Status: DISCONTINUED | OUTPATIENT
Start: 2025-01-13 | End: 2025-01-13 | Stop reason: HOSPADM

## 2025-01-13 RX ORDER — FENTANYL CITRATE 50 UG/ML
INJECTION, SOLUTION INTRAMUSCULAR; INTRAVENOUS
Status: DISCONTINUED | OUTPATIENT
Start: 2025-01-13 | End: 2025-01-13 | Stop reason: HOSPADM

## 2025-01-13 NOTE — DISCHARGE SUMMARY
Discharge Note  Short Stay      SUMMARY     Admit Date: 1/13/2025    Attending Physician: Israel Hurtado      Discharge Physician: Israel Hurtado      Discharge Date: 1/13/2025 8:55 AM    Procedure(s) (LRB):  LUMBAR L5/S1 IL KERI *ELIQUIS CLEARANCE IN CHART* (N/A)    Final Diagnosis: Lumbosacral radiculopathy [M54.17]    Disposition: Home or self care    Patient Instructions:   Current Discharge Medication List        CONTINUE these medications which have NOT CHANGED    Details   acetaminophen (TYLENOL) 650 MG TbSR Take 1 tablet (650 mg total) by mouth every 8 (eight) hours as needed (pain).  Qty: 90 tablet, Refills: 1    Associated Diagnoses: S/P revision of total knee, right      albuterol (PROVENTIL/VENTOLIN HFA) 90 mcg/actuation inhaler INHALE TWO PUFFS INTO LUNGS EVERY 4 TO 6 HOURS AS NEEDED FOR SHORTNESS OF BREATH AND FOR WHEEZING  Qty: 54 g, Refills: 3    Associated Diagnoses: Asthma, well controlled, mild intermittent      allopurinoL (ZYLOPRIM) 300 MG tablet Take 1 tablet (300 mg total) by mouth 2 (two) times daily.  Qty: 180 tablet, Refills: 3    Associated Diagnoses: Gout, unspecified cause, unspecified chronicity, unspecified site      apixaban (ELIQUIS) 5 mg Tab Take 1 tablet (5 mg total) by mouth 2 (two) times daily.  Qty: 60 tablet, Refills: 6      atorvastatin (LIPITOR) 80 MG tablet Take 1 tablet (80 mg total) by mouth once daily.  Qty: 90 tablet, Refills: 3      azithromycin (Z-MICKI) 250 MG tablet Two today then one daily thereafter  Qty: 6 tablet, Refills: 0      b complex vitamins tablet Take 1 tablet by mouth every other day.       blood pressure monitor (BLOOD PRESSURE KIT) Kit 1 kit by Misc.(Non-Drug; Combo Route) route Daily. Check blood pressure daily  Qty: 1 each, Refills: 0      calcium citrate/vitamin D2 (ISIAH-CITRATE ORAL) Take 500 mg by mouth once daily.      celecoxib (CELEBREX) 200 MG capsule 1 tab daily as needed      colchicine (MITIGARE) 0.6 mg Cap One daily as needed for gout flare  Qty: 90  capsule, Refills: 1    Associated Diagnoses: Gout, unspecified cause, unspecified chronicity, unspecified site      cyanocobalamin (VITAMIN B-12) 1000 MCG tablet Take 100 mcg by mouth every morning.      diclofenac sodium (VOLTAREN) 1 % Gel Apply 2 g topically 4 (four) times daily as needed. To painful area on the feet.  Qty: 500 g, Refills: 5    Comments: 5 x 100g tubes  Associated Diagnoses: Right foot pain; Enthesopathy of foot      FLUoxetine 40 MG capsule Take 1 capsule (40 mg total) by mouth once daily.  Qty: 90 capsule, Refills: 3      fluticasone propionate (FLONASE) 50 mcg/actuation nasal spray 1 SPRAY BY EACH NOSTRIL ROUTE 2 TIMES DAILY AS NEEDED FOR RHINITIS.  Qty: 48 g, Refills: 3      indomethacin (INDOCIN) 50 MG capsule Take 1 capsule (50 mg total) by mouth 2 (two) times daily as needed (with meals).  Qty: 30 capsule, Refills: 0    Associated Diagnoses: Type II diabetes mellitus with neurological manifestations; Bilateral foot pain      LIDOcaine (XYLOCAINE) 5 % Oint ointment APPLY TOPICALLY AS NEEDED.  Qty: 35.44 g, Refills: 6    Associated Diagnoses: Right foot pain      LIDOcaine-prilocaine (EMLA) cream APPLY TOPICALLY AS NEEDED. FOR FOOT PAIN  Qty: 30 g, Refills: 2    Associated Diagnoses: Type II diabetes mellitus with neurological manifestations; Bilateral foot pain      metoprolol succinate (TOPROL-XL) 25 MG 24 hr tablet TAKE 1/2 TABLET (12.5 MG TOTAL) BY MOUTH ONCE DAILY.      midodrine (PROAMATINE) 5 MG Tab One daily      MULTIVIT-IRON-MIN-FOLIC ACID 3,500-18-0.4 UNIT-MG-MG ORAL CHEW Take 1 tablet by mouth 2 (two) times daily.       naloxone (NARCAN) 4 mg/actuation Spry 4mg by nasal route as needed for opioid overdose; may repeat every 2-3 minutes in alternating nostrils until medical help arrives. Call 911  Qty: 1 each, Refills: 11    Associated Diagnoses: Lumbar spondylosis; Degeneration of intervertebral disc of lumbar region with discogenic back pain; Primary osteoarthritis of both knees;  Lumbosacral radiculopathy; Chronic pain syndrome; Spondylosis of lumbar region without myelopathy or radiculopathy; DDD (degenerative disc disease), lumbar; Chronic pain of both knees      nystatin (MYCOSTATIN) powder Apply topically 2 (two) times daily.  Qty: 60 g, Refills: 3      oxybutynin (DITROPAN-XL) 5 MG TR24 Take 1 tablet (5 mg total) by mouth once daily.  Qty: 90 tablet, Refills: 3    Associated Diagnoses: Mixed incontinence urge and stress (male)(female)      oxyCODONE-acetaminophen (PERCOCET)  mg per tablet Take 1 tablet by mouth every 6 to 8 hours as needed for Pain (for severe pain).  Qty: 105 tablet, Refills: 0    Comments: Quantity prescribed more than 7 day supply? Yes, quantity medically necessary  Associated Diagnoses: Primary osteoarthritis of both knees; Chronic pain syndrome; Spondylosis of lumbar region without myelopathy or radiculopathy; DDD (degenerative disc disease), lumbar; Chronic pain of both knees      prednisoLONE acetate (PRED FORTE) 1 % DrpS Place 1 drop into the right eye 3 (three) times daily.      semaglutide (OZEMPIC) 1 mg/dose (4 mg/3 mL) Inject 1 mg into the skin every 7 days.  Qty: 3 mL, Refills: 11      senna-docusate 8.6-50 mg (SENNA WITH DOCUSATE SODIUM) 8.6-50 mg per tablet Take 1 tablet by mouth once daily.  Qty: 30 tablet, Refills: 0    Associated Diagnoses: S/P revision of total knee, right      tiZANidine (ZANAFLEX) 4 MG tablet TAKE 1 TABLET (4 MG TOTAL) BY MOUTH EVERY 8 (EIGHT) HOURS AS NEEDED (MUSCLE PAIN).  Qty: 90 tablet, Refills: 2      urea (CARMOL) 40 % Crea Apply topically once daily. To dry skin on the feet.  Qty: 120 g, Refills: 5    Associated Diagnoses: Dry skin      vitamin D 185 MG Tab Take 5,000 mg by mouth every morning.                  Discharge Diagnosis: Lumbosacral radiculopathy [M54.17]  Condition on Discharge: Stable with no complications to procedure   Diet on Discharge: Same as before.  Activity: as per instruction sheet.  Discharge to:  Home with a responsible adult.  Follow up: 2-4 weeks       Please call my office or pager at [phone] if experienced any weakness or loss of sensation, fever > 101.5, pain uncontrolled with oral medications, persistent nausea/vomiting/or diarrhea, redness or drainage from the incisions, or any other worrisome concerns. If physician on call was not reached or could not communicate with our office for any reason please go to the nearest emergency department

## 2025-01-13 NOTE — DISCHARGE INSTRUCTIONS

## 2025-01-13 NOTE — PROGRESS NOTES
Chief Complaint   Patient presents with    Diabetic Foot Exam     Foot Exam/PCP Clarisse Richardson MD  08/19/24           HPI:   The patient is a 59 y.o.  female  who presents for a diabetic foot exam/care   Patient reports + presence of abnormal sensation to the feet, chronic condition.   Patient has no history of wounds on the feet.   Hx of foot surgery: none.     She has history of gout, relieved with indomethacin.  She takes as needed with relief and would like a refill.   This patient has documented high risk feet requiring routine maintenance secondary to diabetes.        Primary care doctor is: Clarisse Richardson MD  Patient last saw primary care doctor on:  Diabetic Foot Exam (Foot Exam/PCP Clarisse Richardson MD  08/19/24)        Patient Active Problem List   Diagnosis    PADDY (obstructive sleep apnea)    Type 2 diabetes mellitus, without long-term current use of insulin    Hyperlipemia    Proteinuria    Bilateral knee pain    DJD (degenerative joint disease) of knee    Debility    Prosthetic joint implant failure    Failed total right knee replacement    Right knee pain    History of total left knee replacement    Lumbago    Chronic, continuous use of opioids    Mild intermittent asthma without complication    Allergic reaction to food    DDD (degenerative disc disease), cervical    Cervical radiculopathy    Cervical spondylosis    Bilateral shoulder bursitis    Cervical stenosis of spinal canal    DDD (degenerative disc disease), thoracic    Thoracic spondylosis    Spondylolisthesis at L4-L5 level    Lumbar spondylosis    Spondylolisthesis of cervical region    Cervical spine instability    Myeloradiculopathy    Neural foraminal stenosis of cervical spine    DDD (degenerative disc disease), lumbar    Idiopathic scoliosis of thoracolumbar spine    Bilateral shoulder region arthritis    Trochanteric bursitis of both hips    Chronic pain    Anxiety    GERD (gastroesophageal reflux disease)     Sacroiliac joint pain    Spondylosis without myelopathy    Subacromial bursitis of left shoulder joint    Sacroiliitis    PADDY non-compliant with CPAP    BMI 40.0-44.9, adult    Gout    History of sleeve gastrectomy    History of total right knee replacement    Noncompliance with CPAP treatment    Osteoarthritis of lumbar spine    Aortic atherosclerosis    Uterine fibroid    Impaired range of motion of both knees    Decreased strength of lower extremity    Impaired functional mobility, balance, gait, and endurance    Paroxysmal atrial fibrillation    History of right MCA stroke    Osteoarthritis of multiple joints    Diabetic polyneuropathy associated with type 2 diabetes mellitus    Allergies    History of COVID-19    History of MRSA infection    Postprocedural hypotension    Hypotension    Impaired ambulation    Lumbosacral radiculopathy           Current Outpatient Medications on File Prior to Visit   Medication Sig Dispense Refill    acetaminophen (TYLENOL) 650 MG TbSR Take 1 tablet (650 mg total) by mouth every 8 (eight) hours as needed (pain). 90 tablet 1    albuterol (PROVENTIL/VENTOLIN HFA) 90 mcg/actuation inhaler INHALE TWO PUFFS INTO LUNGS EVERY 4 TO 6 HOURS AS NEEDED FOR SHORTNESS OF BREATH AND FOR WHEEZING 54 g 3    allopurinoL (ZYLOPRIM) 300 MG tablet Take 1 tablet (300 mg total) by mouth 2 (two) times daily. 180 tablet 3    apixaban (ELIQUIS) 5 mg Tab Take 1 tablet (5 mg total) by mouth 2 (two) times daily. 60 tablet 6    atorvastatin (LIPITOR) 80 MG tablet Take 1 tablet (80 mg total) by mouth once daily. 90 tablet 3    b complex vitamins tablet Take 1 tablet by mouth every other day.       blood pressure monitor (BLOOD PRESSURE KIT) Kit 1 kit by Misc.(Non-Drug; Combo Route) route Daily. Check blood pressure daily 1 each 0    calcium citrate/vitamin D2 (ISIAH-CITRATE ORAL) Take 500 mg by mouth once daily.      colchicine (MITIGARE) 0.6 mg Cap One daily as needed for gout flare (Patient taking  differently: One daily) 90 capsule 1    cyanocobalamin (VITAMIN B-12) 1000 MCG tablet Take 100 mcg by mouth every morning.      fluticasone propionate (FLONASE) 50 mcg/actuation nasal spray 1 SPRAY BY EACH NOSTRIL ROUTE 2 TIMES DAILY AS NEEDED FOR RHINITIS. 48 g 3    LIDOcaine (XYLOCAINE) 5 % Oint ointment APPLY TOPICALLY AS NEEDED. 35.44 g 6    LIDOcaine-prilocaine (EMLA) cream APPLY TOPICALLY AS NEEDED. FOR FOOT PAIN 30 g 2    MULTIVIT-IRON-MIN-FOLIC ACID 3,500-18-0.4 UNIT-MG-MG ORAL CHEW Take 1 tablet by mouth 2 (two) times daily.       nystatin (MYCOSTATIN) powder Apply topically 2 (two) times daily. 60 g 3    oxybutynin (DITROPAN-XL) 5 MG TR24 Take 1 tablet (5 mg total) by mouth once daily. 90 tablet 3    oxyCODONE-acetaminophen (PERCOCET)  mg per tablet Take 1 tablet by mouth every 6 to 8 hours as needed for Pain (for severe pain). 105 tablet 0    prednisoLONE acetate (PRED FORTE) 1 % DrpS Place 1 drop into the right eye 3 (three) times daily.      semaglutide (OZEMPIC) 1 mg/dose (4 mg/3 mL) Inject 1 mg into the skin every 7 days. 3 mL 11    senna-docusate 8.6-50 mg (SENNA WITH DOCUSATE SODIUM) 8.6-50 mg per tablet Take 1 tablet by mouth once daily. 30 tablet 0    tiZANidine (ZANAFLEX) 4 MG tablet TAKE 1 TABLET (4 MG TOTAL) BY MOUTH EVERY 8 (EIGHT) HOURS AS NEEDED (MUSCLE PAIN). 90 tablet 2    urea (CARMOL) 40 % Crea Apply topically once daily. To dry skin on the feet. 120 g 5    vitamin D 185 MG Tab Take 5,000 mg by mouth every morning.       [DISCONTINUED] diclofenac sodium (VOLTAREN) 1 % Gel Apply 2 g topically 4 (four) times daily as needed. To painful area on the feet. 500 g 5    [DISCONTINUED] indomethacin (INDOCIN) 50 MG capsule Take 1 capsule (50 mg total) by mouth 2 (two) times daily as needed (with meals). 30 capsule 0    azithromycin (Z-MICKI) 250 MG tablet Two today then one daily thereafter 6 tablet 0    FLUoxetine 40 MG capsule Take 1 capsule (40 mg total) by mouth once daily. 90 capsule 3     metoprolol succinate (TOPROL-XL) 25 MG 24 hr tablet TAKE 1/2 TABLET (12.5 MG TOTAL) BY MOUTH ONCE DAILY.      midodrine (PROAMATINE) 5 MG Tab One daily      naloxone (NARCAN) 4 mg/actuation Spry 4mg by nasal route as needed for opioid overdose; may repeat every 2-3 minutes in alternating nostrils until medical help arrives. Call 911 1 each 11    [DISCONTINUED] celecoxib (CELEBREX) 200 MG capsule 1 tab daily as needed       Current Facility-Administered Medications on File Prior to Visit   Medication Dose Route Frequency Provider Last Rate Last Admin    0.9%  NaCl infusion   Intravenous Continuous Lina Wagner MD 25 mL/hr at 12/15/20 1506 25 mL/hr at 12/15/20 1506    0.9%  NaCl infusion   Intravenous Continuous Ciro Chung MD        0.9%  NaCl infusion   Intravenous Continuous Santos Barron MD        [DISCONTINUED] 0.9% NaCl infusion   Intravenous Continuous Farrah Rowland MD        [DISCONTINUED] dexAMETHasone sodium phos (PF) injection    PRN Israel Hurtado MD   10 mg at 01/13/25 0851    [DISCONTINUED] fentaNYL injection    PRN Israel Hurtado MD   25 mcg at 01/13/25 0854    [DISCONTINUED] iohexoL (OMNIPAQUE 300) injection    PRN Israel Hurtado MD   5 mL at 01/13/25 0851    [DISCONTINUED] LIDOcaine (PF) 10 mg/ml (1%) injection    PRN sIrael Hurtado MD   5 mL at 01/13/25 0851    [DISCONTINUED] LIDOcaine HCL 20 mg/ml (2%) injection    PRN Israel Hurtado MD   10 mg at 01/13/25 0851    [DISCONTINUED] midazolam (VERSED) 1 mg/mL injection    PRIsrael Mar MD   2 mg at 01/13/25 0854       Review of patient's allergies indicates:  No Known Allergies      ROS:  General ROS: negative  Respiratory ROS: no cough, shortness of breath, or wheezing  Cardiovascular ROS: no chest pain or dyspnea on exertion  Musculoskeletal ROS: negative  Neurological ROS:   negative for - gait disturbance, + numbness/tingling, seizures or tremors  Dermatological ROS: + nail changes, dry skin      LAST HbA1c:   Hemoglobin A1C   Date  "Value Ref Range Status   07/01/2024 6.9 (H) 4.0 - 5.6 % Final     Comment:     ADA Screening Guidelines:  5.7-6.4%  Consistent with prediabetes  >or=6.5%  Consistent with diabetes    High levels of fetal hemoglobin interfere with the HbA1C  assay. Heterozygous hemoglobin variants (HbS, HgC, etc)do  not significantly interfere with this assay.   However, presence of multiple variants may affect accuracy.     01/05/2024 6.2 (H) 4.0 - 5.6 % Final     Comment:     ADA Screening Guidelines:  5.7-6.4%  Consistent with prediabetes  >or=6.5%  Consistent with diabetes    High levels of fetal hemoglobin interfere with the HbA1C  assay. Heterozygous hemoglobin variants (HbS, HgC, etc)do  not significantly interfere with this assay.   However, presence of multiple variants may affect accuracy.     09/18/2023 6.3 (H) 4.0 - 5.6 % Final     Comment:     ADA Screening Guidelines:  5.7-6.4%  Consistent with prediabetes  >or=6.5%  Consistent with diabetes    High levels of fetal hemoglobin interfere with the HbA1C  assay. Heterozygous hemoglobin variants (HbS, HgC, etc)do  not significantly interfere with this assay.   However, presence of multiple variants may affect accuracy.           EXAM:   Vitals:    01/13/25 1502   BP: 128/80   Pulse: 78   Weight: 111.1 kg (244 lb 14.9 oz)   Height: 5' 5" (1.651 m)         General: Pt. is well-developed, well-nourished, appears stated age, in no acute distress, alert and oriented x 3.      Vascular: Dorsalis pedis and posterior tibial pulses are 2/4 bilaterally. 3 secs capillary refill time and toes are warm to touch.    There is reduced hair growth on the feet.    There is 1+ edema.  no varicosities.        Neurological:  Light touch, proprioception, and sharp/dull sensation are all intact bilaterally. Protective threshold with the Epworth-Lindsay monofilament is diminished bilaterally.   +paresthesias      Dermatological:  The skin is thin    The toenails  1-5 L and 1-5 R  are greenish- " "yellow, long by 5-6mm and thickened by 2-3mm with subungual debris  .   There are no open wounds. There is no ecchymoses.  no erythema noted.   Skin diffusely dry and xerotic on the soles, worse on the left.   Multiple nucleated     Interdigital spaces are intact, no fissures    Skin atrophic, thin, dry and mildly hyperpigmented.         Musculoskeletal:    Muscle strength is 5/5 in all groups bilaterally.    Metatarsophalangeal, subtalar, and ankle range of motion are intact without crepitus.     Ankle equinus bilateral           Assessment / Plan:    Problem List Items Addressed This Visit    None  Visit Diagnoses       Encounter for diabetic foot exam    -  Primary    Chronic anticoagulation        Relevant Orders    Routine Foot Care    Type II diabetes mellitus with neurological manifestations  (Chronic)       Relevant Orders    Routine Foot Care    Bilateral foot pain  (Chronic)       Relevant Medications    indomethacin (INDOCIN) 50 MG capsule    Other Relevant Orders    Routine Foot Care    Dermatophytosis of nail                I counseled the patient on the patient's conditions, their implications and medical management.   Annual diabetes foot exam.  Continue good nutrition and blood sugar control to help prevent podiatric complications of diabetes.   Maintain proper foot hygiene.   Continue wearing proper shoe gear, daily foot inspections, never walking without protective shoe gear, never putting sharp instruments to feet.  Shoe and activity modification as needed for relief.   Continue urea cream daily.   Prescription Meds as ordered      Routine Foot Care    Date/Time: 1/13/2025 2:30 PM    Performed by: Stacey Rowland DPM  Authorized by: Stacey Rowland DPM    Time out: Immediately prior to procedure a "time out" was called to verify the correct patient, procedure, equipment, support staff and site/side marked as required.    Consent Done?:  Yes (Verbal)    Nail Care Type:  Debride  Location(s): All  " (Left 1st Toe, Left 3rd Toe, Left 2nd Toe, Left 4th Toe, Left 5th Toe, Right 1st Toe, Right 2nd Toe, Right 3rd Toe, Right 4th Toe and Right 5th Toe)  Patient tolerance:  Patient tolerated the procedure well with no immediate complications     With patient's permission, the toenails mentioned above were reduced and debrided using a nail nipper, removing offending nail and debris. The patient will continue to monitor the areas daily, inspect the feet, wear protective shoe gear when ambulatory, and moisturizer to maintain skin integrity.

## 2025-01-13 NOTE — INTERVAL H&P NOTE
The patient has been examined and the H&P has been reviewed:    I concur with the findings and no changes have occurred since H&P was written.    Procedure risks, benefits and alternative options discussed and understood by patient/family.          Active Hospital Problems    Diagnosis  POA    Lumbosacral radiculopathy [M54.17]  Unknown      Resolved Hospital Problems   No resolved problems to display.

## 2025-01-13 NOTE — OP NOTE
Lumbar Interlaminar Epidural Steroid Injection under Fluoroscopic Guidance    The procedure, risks, benefits, and options were discussed with the patient. There are no contraindications to the procedure. The patent expressed understanding and agreed to the procedure. Informed written consent was obtained prior to the start of the procedure and can be found in the patient's chart.    PATIENT NAME: Alivia Thomas   MRN: [id number]     DATE OF PROCEDURE: 01/13/2025    PROCEDURE: Lumbar Interlaminar Epidural Steroid Injection L5/S1 under Fluoroscopic Guidance    PRE-OP DIAGNOSIS: Lumbosacral radiculopathy [M54.17] Lumbar radiculopathy [M54.16]    POST-OP DIAGNOSIS: Same    PHYSICIAN: Israel Hurtado MD    ASSISTANTS: Phuong Nguyen, DO [facility]      MEDICATIONS INJECTED: Preservative-free Decadron 10mg with 4cc of Lidocaine 1% MPF and preservative free normal saline    LOCAL ANESTHETIC INJECTED: Xylocaine 2%     SEDATION: Versed 2mg and Fentanyl 25mcg                                                                                                                                                                                     Conscious sedation ordered by M.D. Patient re-evaluation prior to administration of conscious sedation. No changes noted in patient's status from initial evaluation. The patient's vital signs were monitored by RN and patient remained hemodynamically stable throughout the procedure.    Event Time In   Sedation Start 0854   Sedation End 0903       ESTIMATED BLOOD LOSS: None    COMPLICATIONS: None    TECHNIQUE: Time-out was performed to identify the patient and procedure to be performed. With the patient laying in a prone position, the surgical area was prepped and draped in the usual sterile fashion using ChloraPrep and a fenestrated drape. The level was determined under fluoroscopy guidance. Skin anesthesia was achieved by injecting Lidocaine 2% over the injection site. The interlaminar space  was then approached with a 20 gauge,  3.5 inch Tuohy needle that was introduced under fluoroscopic guidance in the AP, lateral and/or contralateral oblique imaging. Once the Ligamentum flavum was encountered loss of resistance to saline was used to enter the epidural space. With positive loss of resistance and negative aspiration for CSF or Blood, contrast dye Omnipaque (300mg/mL) was injected to confirm placement and there was no vascular runoff. 4 mL of the medication mixture listed above was injected slowly. Displacement of the radio opaque contrast after injection of the medication confirmed that the medication went into the area of the epidural space. The needles were removed and bleeding was nil. A sterile dressing was applied. No specimens collected. The patient tolerated the procedure well.     PRE-PROCEDURE PAIN SCORE: 7-8/10    POST-PROCEDURE PAIN SCORE: 0/10    The patient was monitored after the procedure in the recovery area. They were given post-procedure and discharge instructions to follow at home. The patient was discharged in a stable condition.        Israel Hurtado MD

## 2025-01-14 ENCOUNTER — PATIENT MESSAGE (OUTPATIENT)
Dept: BARIATRICS | Facility: CLINIC | Age: 60
End: 2025-01-14
Payer: MEDICARE

## 2025-01-14 NOTE — TELEPHONE ENCOUNTER
Refill Routing Note   Medication(s) are not appropriate for processing by [facility] for the following reason(s):        Outside of protocol    [facility] action(s):  Route               Appointments  past 12m or future 3m with PCP    Date Provider   Last Visit   8/19/2024 Clarisse Richardson MD   Next Visit   Visit date not found Clarisse Richardson MD   ED visits in past 90 days: 0        Note composed:9:44 PM 01/13/2025

## 2025-01-14 NOTE — TELEPHONE ENCOUNTER
Patient requesting refill on Percocet  Last office visit 12/18/24   shows last refill on 12/20/24  Patient does have a pain contract on file with [facility]  Patient last UDS 12/18/24 was consistent with current therapy  ]  CODEINE  Not Detected Not Detected CM  Not Detected  Not Detected Not Detected   Comment: INTERPRETIVE INFORMATION: Codeine, U  Positive Cutoff: 40 ng/mL  Methodology: Mass Spectrometry   MORPHINE  Not Detected Not Detected CM  Not Detected  Not Detected Not Detected   Comment: INTERPRETIVE INFORMATION:Morphine, U  Positive Cutoff: 20 ng/mL  Methodology: Mass Spectrometry   6-ACETYLMORPHINE  Not Detected Not Detected CM  Not Detected  Not Detected Not Detected   Comment: INTERPRETIVE INFORMATION:6-acetylmorphine, U  Positive Cutoff: 20 ng/mL  Methodology: Mass Spectrometry   OXYCODONE  Present Not Detected CM  Present  Present Present   Comment: INTERPRETIVE INFORMATION:Oxycodone, U  Positive Cutoff: 40 ng/mL  Methodology: Mass Spectrometry   NOROYXCODONE  Present Present CM  Present  Present Present   Comment: INTERPRETIVE INFORMATION:Noroxycodone, U  Positive Cutoff: 100 ng/mL  Methodology: Mass Spectrometry   OXYMORPHONE  Present Not Detected CM  Present  Present Present   Comment: INTERPRETIVE INFORMATION:Oxymorphone, U  Positive Cutoff: 40 ng/mL  Methodology: Mass Spectrometry   NOROXYMORPHONE  Not Detected Not Detected CM  Not Detected  Not Detected Not Detected   Comment: INTERPRETIVE INFORMATION:Noroxymorphone, U  Positive Cutoff: 100 ng/mL  Methodology: Mass Spectrometry   HYDROCODONE  Not Detected Not Detected CM  Not Detected  Not Detected Not Detected   Comment: INTERPRETIVE INFORMATION:Hydrocodone, U  Positive Cutoff: 40 ng/mL  Methodology: Mass Spectrometry   NORHYDROCODONE  Not Detected Not Detected CM  Not Detected  Not Detected Not Detected   Comment: INTERPRETIVE INFORMATION:Norhydrocodone, U  Positive Cutoff: 100 ng/mL  Methodology: Mass  Spectrometry   HYDROMORPHONE  Not Detected Not Detected CM  Not Detected  Not Detected Not Detected   Comment: INTERPRETIVE INFORMATION:Hydromorphone, U  Positive Cutoff: 20 ng/mL  Methodology: Mass Spectrometry   BUPRENORPHINE  Not Detected Not Detected CM  Not Detected  Not Detected Not Detected   Comment: INTERPRETIVE INFORMATION:Buprenorphine, U  Positive Cutoff: 5 ng/mL  Methodology: Mass Spectrometry   NORUBPRENORPHINE  Not Detected Not Detected CM  Not Detected  Not Detected Not Detected   Comment: INTERPRETIVE INFORMATION:Norbuprenorphine, U  Positive Cutoff: 20 ng/mL  Methodology: Mass Spectrometry   FENTANYL  Not Detected Not Detected CM  Not Detected  Not Detected Not Detected   Comment: INTERPRETIVE INFORMATION:Fentanyl, U  Positive Cutoff: 2 ng/mL  Methodology: Mass Spectrometry   NORFENTANYL  Not Detected Not Detected CM  Not Detected  Not Detected Not Detected   Comment: INTERPRETIVE INFORMATION:Norfentanyl, U  Positive Cutoff: 2 ng/mL  Methodology: Mass Spectrometry   MEPERIDINE METABOLITE  Not Detected Not Detected CM  Not Detected  Not Detected Not Detected   Comment: INTERPRETIVE INFORMATION:Meperidine metabolite, U  Positive Cutoff: 50 ng/mL  Methodology: Mass Spectrometry   TAPENTADOL  Not Detected Not Detected CM  Not Detected  Not Detected Not Detected   Comment: INTERPRETIVE INFORMATION:Tapentadol, U  Positive Cutoff: 100 ng/mL  Methodology: Mass Spectrometry   TAPENTADOL-O-SULF  Not Detected Not Detected CM  Not Detected  Not Detected Not Detected   Comment: INTERPRETIVE INFORMATION:Tapentadol-o-Sulf, U  Positive Cutoff: 200 ng/mL  Methodology: Mass Spectrometry   METHADONE  Negative Negative CM  Not Detected  Not Detected Not Detected   Comment: Presumptive negative by immunoassay. Testing by mass  spectrometry is available on request.  INTERPRETIVE INFORMATION: Methadone Screen, U  Positive Cutoff: 150 ng/mL  Methodology: Immunoassay   TRAMADOL  Negative Negative CM  Not Detected  Not  Detected Not Detected   Comment: Presumptive negative by immunoassay. Testing by mass  spectrometry is available on request.  INTERPRETIVE INFORMATION:Tramadol Screen, U  Positive Cutoff: 100 ng/mL  Methodology: Immunoassay   AMPHETAMINE  Not Detected Not Detected CM  Not Detected  Not Detected Not Detected   Comment: INTERPRETIVE INFORMATION:Amphetamine, U  Positive Cutoff: 50 ng/mL  Methodology: Mass Spectrometry   METHAMPHETAMINE  Not Detected Not Detected CM  Not Detected  Not Detected Not Detected   Comment: INTERPRETIVE INFORMATION:Methamphetamine, U  Positive Cutoff: 200 ng/mL  Methodology: Mass Spectrometry   MDMA- ECSTASY  Not Detected Not Detected CM  Not Detected  Not Detected Not Detected   Comment: INTERPRETIVE INFORMATION:MDMA, U  Positive Cutoff: 200 ng/mL  Methodology: Mass Spectrometry   MDA  Not Detected Not Detected CM  Not Detected  Not Detected Not Detected   Comment: INTERPRETIVE INFORMATION:MDA, U  Positive Cutoff: 200 ng/mL  Methodology: Mass Spectrometry   MDEA- Kait  Not Detected Not Detected CM  Not Detected  Not Detected Not Detected   Comment: INTERPRETIVE INFORMATION:MDEA, U  Positive Cutoff: 200 ng/mL  Methodology: Mass Spectrometry   METHYLPHENIDATE  Not Detected Not Detected CM  Not Detected  Not Detected Not Detected   Comment: INTERPRETIVE INFORMATION:Methylphenidate, U  Positive Cutoff: 100 ng/mL  Methodology: Mass Spectrometry   PHENTERMINE  Not Detected Not Detected CM  Not Detected  Not Detected Not Detected   Comment: INTERPRETIVE INFORMATION:Phentermine, U  Positive Cutoff: 100 ng/mL  Methodology: Mass Spectrometry   BENZOYLECGONINE  Negative Negative CM  Not Detected  Not Detected Not Detected   Comment: Presumptive negative by immunoassay. Testing by mass  spectrometry is available on request.  INTERPRETIVE INFORMATION:Cocaine Screen, U  Positive Cutoff: 150 ng/mL  Methodology: Immunoassay   ALPRAZOLAM  Not Detected Not Detected CM  Not Detected  Not Detected Not Detected    Comment: INTERPRETIVE INFORMATION:Alprazolam, U  Positive Cutoff: 40 ng/mL  Methodology: Mass Spectrometry   ALPHA-OH-ALPRAZOLAM  Not Detected Not Detected CM  Not Detected  Not Detected Not Detected   Comment: INTERPRETIVE INFORMATION:Alpha-OH-Alprazolam, U  Positive Cutoff: 20 ng/mL  Methodology: Mass Spectrometry   CLONAZEPAM  Not Detected Not Detected CM  Not Detected  Not Detected Not Detected   Comment: INTERPRETIVE INFORMATION:Clonazepam, U  Positive Cutoff: 20 ng/mL  Methodology: Mass Spectrometry   7-AMINOCLONAZEPAM  Not Detected Not Detected CM  Not Detected  Not Detected Not Detected   Comment: INTERPRETIVE INFORMATION:7-Aminoclonazepam, U  Positive Cutoff: 40 ng/mL  Methodology: Mass Spectrometry   DIAZEPAM  Not Detected Not Detected CM  Not Detected  Not Detected Not Detected   Comment: INTERPRETIVE INFORMATION:Diazepam, U  Positive Cutoff: 50 ng/mL  Methodology: Mass Spectrometry   NORDIAZEPAM  Not Detected Not Detected CM  Not Detected  Not Detected Not Detected   Comment: INTERPRETIVE INFORMATION:Nordiazepam, U  Positive Cutoff: 50 ng/mL  Methodology: Mass Spectrometry   OXAZEPAM  Not Detected Not Detected CM  Not Detected  Not Detected Not Detected   Comment: INTERPRETIVE INFORMATION:Oxazepam, U  Positive Cutoff: 50 ng/mL  Methodology: Mass Spectrometry   TEMAZEPAM  Not Detected Not Detected CM  Not Detected  Not Detected Not Detected   Comment: INTERPRETIVE INFORMATION:Temazepam, U  Positive Cutoff: 50 ng/mL  Methodology: Mass Spectrometry   Lorazepam  Not Detected Not Detected CM  Not Detected  Not Detected Not Detected   Comment: INTERPRETIVE INFORMATION:Lorazepam, U  Positive Cutoff: 60 ng/mL  Methodology: Mass Spectrometry   MIDAZOLAM  Not Detected Not Detected CM  Not Detected  Not Detected Not Detected   Comment: INTERPRETIVE INFORMATION:Midazolam, U  Positive Cutoff: 20 ng/mL  Methodology: Mass Spectrometry   ZOLPIDEM  Not Detected Not Detected CM  Not Detected  Not Detected Not Detected    Comment: INTERPRETIVE INFORMATION:Zolpidem, U  Positive Cutoff: 20 ng/mL  Methodology: Mass Spectrometry   BARBITURATES  Negative Negative CM  Not Detected  Not Detected Not Detected   Comment: Presumptive negative by immunoassay. Testing by mass  spectrometry is available on request.  INTERPRETIVE INFORMATION:Barbiturates Screen, U  Positive Cutoff: 200 ng/mL  Methodology: Immunoassay   Creatinine, Urine 20.0 - 400.0 mg/dL 83.3 76.2 117.0 R 61.1 66.0 R 112.3 93.0   ETHYL GLUCURONIDE  Negative Negative CM  Not Detected  Not Detected Not Detected   Comment: Presumptive negative by immunoassay. Testing by mass  spectrometry is available on request.  INTERPRETIVE INFORMATION:Ethyl Glucuronide Screen, U  Positive Cutoff: 500 ng/mL  Methodology: Immunoassay   MARIJUANA METABOLITE  Negative Negative CM  Not Detected CM  Not Detected Not Detected   Comment: Presumptive negative by immunoassay. Testing by mass  spectrometry is available on request.  INTERPRETIVE INFORMATION: THC (Cannabinoids) Screen, U  Positive Cutoff: 50 ng/mL  Methodology: Immunoassay   PCP  Negative Negative CM  Not Detected  Not Detected Not Detected   Comment: Presumptive negative by immunoassay. Testing by mass  spectrometry is available on request.  INTERPRETIVE INFORMATION:Phencyclidine Screen, U  Positive Cutoff: 25 ng/mL  Methodology: Immunoassay   CARISOPRODOL  Negative Negative CM  Not Detected CM  Not Detected CM Not Detected CM   Comment: Presumptive negative by immunoassay. Testing by mass  spectrometry is available on request.  INTERPRETIVE INFORMATION: Carisoprodol Screen, U  Positive Cutoff: 100 ng/mL  Methodology: Immunoassay  The carisoprodol immunoassay has cross-reactivity to  carisoprodol and meprobamate.   Naloxone  Not Detected Not Detected CM  Not Detected  Not Detected Not Detected   Comment: INTERPRETIVE INFORMATION:Naloxone, U  Positive Cutoff: 100 ng/mL  Methodology: Mass Spectrometry   Gabapentin  Not Detected Not Detected  CM  Not Detected  Not Detected Not Detected   Comment: INTERPRETIVE INFORMATION:Gabapentin, U  Positive Cutoff: 3,000 ng/mL  Methodology: Mass Spectrometry   Pregabalin  Not Detected Not Detected CM  Not Detected  Not Detected Not Detected   Comment: INTERPRETIVE INFORMATION:Pregabalin, U  Positive Cutoff: 3,000 ng/mL  Methodology: Mass Spectrometry   Alpha-OH-Midazolam  Not Detected Not Detected CM  Not Detected  Not Detected Not Detected   Comment: INTERPRETIVE INFORMATION:Alpha-OH-Midazolam, U  Positive Cutoff: 20 ng/mL  Methodology: Mass Spectrometry   Zolpidem Metabolite  Not Detected Not Detected CM  Not Detected  Not Detected Not Detected   Comment: INTERPRETIVE INFORMATION:Zolpidem Metabolite, U  Positive Cutoff: 100 ng/mL  Methodology: Mass Spectrometry

## 2025-01-15 RX ORDER — OXYCODONE AND ACETAMINOPHEN 10; 325 MG/1; MG/1
1 TABLET ORAL
Qty: 105 TABLET | Refills: 0 | Status: SHIPPED | OUTPATIENT
Start: 2025-01-15

## 2025-01-20 ENCOUNTER — PATIENT MESSAGE (OUTPATIENT)
Dept: ADMINISTRATIVE | Facility: OTHER | Age: 60
End: 2025-01-20
Payer: MEDICARE

## 2025-01-20 DIAGNOSIS — Z96.651 S/P REVISION OF TOTAL KNEE, RIGHT: ICD-10-CM

## 2025-01-20 RX ORDER — DEXTROMETHORPHAN HYDROBROMIDE, GUAIFENESIN 5; 100 MG/5ML; MG/5ML
650 LIQUID ORAL EVERY 8 HOURS PRN
Qty: 90 TABLET | Refills: 1 | OUTPATIENT
Start: 2025-01-20

## 2025-01-21 RX ORDER — DEXTROMETHORPHAN HYDROBROMIDE, GUAIFENESIN 5; 100 MG/5ML; MG/5ML
650 LIQUID ORAL EVERY 8 HOURS PRN
Qty: 90 TABLET | Refills: 1 | Status: SHIPPED | OUTPATIENT
Start: 2025-01-21

## 2025-01-21 NOTE — TELEPHONE ENCOUNTER
Refill Routing Note   Medication(s) are not appropriate for processing by [facility] for the following reason(s):        Outside of protocol    [facility] action(s):  Route               Appointments  past 12m or future 3m with PCP    Date Provider   Last Visit   8/19/2024 Clarisse Richardson MD   Next Visit   Visit date not found Clarisse Richardson MD   ED visits in past 90 days: 0        Note composed:8:42 AM 01/21/2025

## 2025-01-23 ENCOUNTER — TELEPHONE (OUTPATIENT)
Dept: PAIN MEDICINE | Facility: CLINIC | Age: 60
End: 2025-01-23
Payer: MEDICARE

## 2025-01-23 NOTE — TELEPHONE ENCOUNTER
----- Message from MarcinWeekend-a-gogorahat sent at 1/23/2025  1:26 PM CST -----   Name of Who is Calling:     What is the request in detail: request call back in reference to pharmacy request prior authorization for medication    oxyCODONE-acetaminophen (PERCOCET)  mg per tablet / patient is out of medication   Please contact to further discuss and advise     Can the clinic reply by [facility]:     What Number to Call Back if not in [facility]:   [phone]

## 2025-01-24 ENCOUNTER — TELEPHONE (OUTPATIENT)
Dept: PAIN MEDICINE | Facility: CLINIC | Age: 60
End: 2025-01-24
Payer: MEDICARE

## 2025-01-24 NOTE — TELEPHONE ENCOUNTER
----- Message from Jorge Luis sent at 1/24/2025 10:35 AM CST -----  Regarding: Rx  Name of Who is Calling:  Patient          What is the request in detail:  P [phone] she stated she is out of her oxyCODONE-acetaminophen (PERCOCET)  mg per tablet and was told she need  a prior authorixation            Can the clinic reply by [facility]: Yes            [facility]:

## 2025-01-24 NOTE — TELEPHONE ENCOUNTER
----- Message from Sarah sent at 1/24/2025 10:19 AM CST -----  Type: Patient call    Who called: Patient    Does the patient know what this is regarding? Requesting a call back ; please advise     Would the patient rather a call back or response via [facility]? Call    Best call back number: [phone]    Additional information:

## 2025-01-27 ENCOUNTER — OFFICE VISIT (OUTPATIENT)
Dept: PAIN MEDICINE | Facility: CLINIC | Age: 60
End: 2025-01-27
Payer: MEDICARE

## 2025-01-27 VITALS
WEIGHT: 256.19 LBS | HEART RATE: 76 BPM | OXYGEN SATURATION: 98 % | SYSTOLIC BLOOD PRESSURE: 135 MMHG | RESPIRATION RATE: 18 BRPM | DIASTOLIC BLOOD PRESSURE: 81 MMHG | TEMPERATURE: 99 F | BODY MASS INDEX: 42.63 KG/M2

## 2025-01-27 DIAGNOSIS — Z96.651 S/P REVISION OF TOTAL KNEE, RIGHT: ICD-10-CM

## 2025-01-27 DIAGNOSIS — M17.0 PRIMARY OSTEOARTHRITIS OF BOTH KNEES: ICD-10-CM

## 2025-01-27 DIAGNOSIS — M47.816 LUMBAR SPONDYLOSIS: ICD-10-CM

## 2025-01-27 DIAGNOSIS — G89.4 CHRONIC PAIN SYNDROME: Primary | ICD-10-CM

## 2025-01-27 DIAGNOSIS — M51.360 DEGENERATION OF INTERVERTEBRAL DISC OF LUMBAR REGION WITH DISCOGENIC BACK PAIN: ICD-10-CM

## 2025-01-27 DIAGNOSIS — M47.816 SPONDYLOSIS OF LUMBAR REGION WITHOUT MYELOPATHY OR RADICULOPATHY: ICD-10-CM

## 2025-01-27 PROCEDURE — 99999 PR PBB SHADOW E&M-EST. PATIENT-LVL V: CPT | Mod: PBBFAC,,, | Performed by: NURSE PRACTITIONER

## 2025-01-27 PROCEDURE — 99215 OFFICE O/P EST HI 40 MIN: CPT | Mod: PBBFAC | Performed by: NURSE PRACTITIONER

## 2025-01-27 PROCEDURE — 99214 OFFICE O/P EST MOD 30 MIN: CPT | Mod: S$PBB,,, | Performed by: NURSE PRACTITIONER

## 2025-01-27 NOTE — PROGRESS NOTES
Chronic Pain-Established Note (Follow up visit)           SUBJECTIVE:    Interval History 1/27/2025:  The patient is here for follow up of chronic back pain. She is s/p L5/S1 IL KERI with 60% relief. She is happy with the results. She has some aching back pain which she says is tolerable with medications at this time. She continues with daily PT exercises for the back and knees. She is taking Percocet as needed for pain with benefit. No refills needed today. Her pain today is 8/10.    Interval History 12/18/2024:  Patient returns to clinic for follow up of low back pain and knee pain. She has completed physical therapy for her knees and continues to do HEP as well as swimming. She continues to take Percocet four times a day, she states she was taking three to four times a day prior to surgery, and has not been able to reduce since the surgery. She denies any recent health changes. She denies recent falls or trauma. She denies new onset fever/night sweats, urinary incontinence, bowel incontinence, significant weight changes, significant motor weakness or changes, or loss of sensations.    Her back pain is focused to the middle low back, the pain is axial in nature mainly, but can radiate down the anterior thighs to the knees. Described as sharp/shooting and aching throbbing, exacerbated by leaning back, mitigated by rest. Pain is rated as 7/10. She inquires about repeat lumbar RFA, last one is in 2/12/2024, she endorses 2.5 months of good relief.     Interval History 9/13/2024:  Alivia Thomas returns to clinic virtually for follow-up after right TKA and post-operative pain. She states she has been doing well in physical therapy biweekly and has been doing home exercises most days. She states she is healing well and hoping to progress to aquatic therapy once finished with physical therapy for her knee. She has been on Percocet 10/325 4 times per day for the last month which has helped her tolerate and progress in  physical therapy. She has about 1 week left of this last prescription. She believes she could probably tolerate decreasing her dose slightly in hopes of getting down to her previous dose of Percocet 10/325 TID PRN. She denies any recent health changes. She denies recent falls or trauma. She denies new onset fever/night sweats, urinary incontinence, bowel incontinence, significant weight changes, significant motor weakness or changes, or loss of sensations. Her pain today is 7/10.     Interval History 8/16/2024:  lAivia Thomas returns to clinic for follow-up of chronic lower back pain. She is s/p right TKA revision on 7/18/2024. She continues Percocet with mild relief. She has been having increased pain since her TKA and with associated physical therapy. She is wondering if her dose could be temporarily increased. She also takes Tylenol arthritis with good relief. She denies any perceived side effects. She denies recent falls or trauma. She denies new onset fever/night sweats, urinary incontinence, bowel incontinence, significant weight changes, significant motor weakness or changes, or loss of sensations. Her pain today is 8/10.     Interval History 5/29/2024:  Alivia Thomas returns to clinic s/p Bilateral SIJ and GTB injections on 5/16/2024.  She reports 70% relief. She is able to complete ADLs with limited pain and her quality of life is improved. She takes Percocet 10/325 TID PRN with good relief. She denies any perceived side effects. She is going to restart water aerobics at her local pool.  She is having a right TKA revision in July.  After healing from that surgery is complete, she would like to start aquatic therapy for her back. The patient denies fever/night sweats, urinary incontinence, bowel incontinence, significant weight changes, significant motor weakness or changes, or loss of sensations. Her pain today is 4/10.    Interval History 4/29/2024:  Alivia Thomas returns today for follow-up of  back pain and right knee pain.  She denies any radicular pain.  She notices bilateral lower back pain with pain into the buttocks and lateral thighs. She takes Percocet TID PRN with good relief.  She denies any perceived side effects. Her pharmacy has been out of the medication and she has been out since 3/10/2024.  The patient denies fever/night sweats, urinary incontinence, bowel incontinence, significant weight changes, significant motor weakness or changes, or loss of sensations. Her pain today is 9/10.    Interval History 1/9/2024:  The patient returns to clinic today for follow up of back pain via virtual visit. She is s/p right L3,4,5 RFA on 11/27/2023. She reports 40-50% relief at this time. She did not have left sided RFA due to illness. Of note, she had a stroke last week. She began having a headache, left sided facial drooping, and slurring of her speech. She did go to the ER where she received TPA. She reports mild weakness into her left arm. She continues to report low back pain. She denies any radicular leg pain. Her pain is worse with activity. She also reports bilateral knee pain, worse with standing and walking. She is scheduled for genicular RFA in the next few weeks. She will reschedule left lumbar RFA. She is taking Percocet as needed with benefit. She denies any adverse effects. She denies any other health changes.      Interval History 10/24/23:  Alivia Thomas returns today for follow-up. Since last seen, they report their pain has been worsening. She last received a toradol shot instead of an epidural as she did not wish to have an epidural.  Today, she is here for follow-up with me to evaluate.  She is requesting to increase her oxycodone to 4 times a day instead of 3 times a day.  I had a long discussion with her and advised her we either change the medication but not increase it, try repeating radiofrequency ablation since it worked well for her and/or spinal cord stimulator.   Yesterday, surgery was another alternative for managing her pain.  She had a CT scan that we went over the results.  She has multilevel degenerative disease with severe facet degenerative disease and multilevel spinal and foraminal stenosis.  Yesterday, she declined the surgery.  Today she is open to repeating the radiofrequency ablation but she does not want to change the medication.  Previously, the radiofrequency ablation lasted several months up to a year.  The last time she said it lasted about 3-4 month than the pain started coming back gradually over the following 3-4 months.  Overall, she still had good relief.  She is complaining of the right knee as well.  Much less pain on the left knee.  The radicular component of her back pain is in the dorsal thighs bilaterally worse on the right negligible on the left.  No new bowel or bladder symptomatology.  No fever, chills or night sweats.    Interval History 10/23/23: Alivia Thomas returns for follow up. This is a patient of Dr. Hurtado. Her visit with [place] was cancelled last week so she was placed on my schedule today. At her last visit she was having pain radiating down her legs, but did not want to have an epidural. They performed a Toradol shot (short term relief) and referred her to Neurosurgery. She saw Dr. Bartlett today who said either she will need SCS or decompression. On review of her CT and MRI, she has severe facet arthropathy and severe canal stenosis at L3/4 and L4/5. She notes being able to walk <1 block, after which she must sit down. She reports 8-10/10 pain and this is interfering with her life significantly.     Interval History 9/18/2023:  The patient presents today to request an injection for increased back pain. She has been having worsened pain over the past couple of weeks. She does not wish to have another KERI at this time. She is having lower back pain with radiation down the back of both legs, R>L. She has associated numbness and  tingling as well as subjective weakness. No bowel or bladder incontinence. She has not seen neurosurgery. Her pain today is 10/10.    Interval History 8/14/2023:  The patient is here for follow up after procedure for back pain. She is now s/p L5/S1 IL KERI with about 50% relief. She still has lower back pain with radiation down the back of the legs, stopping at the knees. She has associated numbness to lower extremities. Her pain worsens with walking, bending and reaching upward. She has been working on home exercises and stretches without much benefit. She has some benefit with medication as needed.  She report Her pain today is 9/10.    Interval History 7/10/2023:  The patient is here today for evaluation of increased lower back pain. She has a long-standing history of back pain which is mainly axial. She has had worsened symptoms over the past couple of weeks, most severe over the past 3 days. The pain now radiates down the back of both legs with burning and numbness. She finds it difficult to stand or walk for any length of time. No bowel or bladder incontinence. No fever or malaise. Medications are helping somewhat. She continues with home PT exercises as previously taught in PT. Her pain today is 10/10.    Interval History 4/10/2023:  The patient returns to clinic today for follow up of low back and knee pain via virtual visit. She is s/p right L3,4,5 RFA on 3/6/2023. Her left side procedure was rescheduled due to illness. This is scheduled for May 1st. She reports some relief of her right sided low back pain. She continues to report left sided low back pain. She denies any radicular leg pain. She does endorse morning stiffness. She continues to perform a home exercise routine. She is taking Percocet as needed for severe pain. She denies any other health changes.     Interval History 1/30/2023:  The patient returns to clinic today for follow up of low back and knee pain. She is s/p right genicular RFA on  12/12/2022 and left genicular RFA on 1/9/2023. She reports 60% relief of her knee pain. She reports intermittent bilateral knee pain, this is tolerable at this time. She reports increased low back pain. Her pain is constant and aching in nature. Her leg pain is much improved. Her pain is worse with moving from sitting to standing. She does endorse morning stiffness. She continues to participate in physical therapy and a home exercise routine. She continues to take Percocet as needed with benefit and without adverse effects. She denies any other health changes.     Interval History 11/22/2022:  The patient returns to clinic today for follow up of low back and knee pain. She continues to report low back pain that radiates into the posterior aspect of both legs to under her feet. Her pain is worse with moving from sitting to standing. She also endorses morning stiffness. She recently started physical therapy. She has completed one session. She continues to report bilateral knee pain. She endorses worsening pain with the cold weather. Her pain is worse with standing and walking. She continues to take Percocet as needed with benefit and without adverse effects. She denies any other health changes. Her pain today is 8/10.    Interval History 10/5/2022:  She returns for follow-up.  Her knees are doing well.  She has some discomfort but not enough to do procedures.  Her main complaint is lumbar spine pain for the past few weeks that is radiating to the dorsal aspect of both lower extremities.  She has no red flag signs/symptoms.  No new bowel or bladder symptomatology.  The pain radiates from the back down to the plantar aspect of both feet.  It is activity related.  Rest helps some but it does not resolve the pain.  She has not been in therapy for a while.  No recent imaging.  No recent weight changes.  Otherwise, no new symptomatology.  She goes regularly to her primary care physician for health maintenance.    Interval  History 8/9/2022:  Alivia Thomas presents to the clinic for a follow-up appointment for low back and knee pain. She is s/p right genicular RFA on 5/9/2022 and left genicular RFA on 5/22/2022. She reports 60% relief of her knee pain. She also had panniculectomy on 6/14/2022. She is healing well. She continues to report intermittent low back pain, worse with prolonged activity. She denies any radicular leg pain. Her pain is worse prolonged standing and walking. She continues to perform a homoe exercise routine. She continues to take Percocet as needed with benefit and without adverse effects. She denies any other health changes. Her pain today is 7/10.    Interval History 4/9/2022:  The patient returns to clinic today for follow-up bilateral L3, 4, 5 RFA last month.  She reports that she is feeling very well following the procedure and reports 60-70% pain relief.  Today, she reports that her bilateral knee pain has continued to worsen, and she would like to go ahead and repeat bilateral genicular RFA as soon as possible.  She denies any new numbness, tingling, weakness, saddle anesthesia or bowel/bladder incontinence.    Interval History 12/28/2021:  The patient returns to clinic today for follow up via virtual visit. She continues to report bilateral knee pain. This pain is worse with prolonged standing and walking. She continues to report low back and buttock pain. She denies any radicular leg pain. She continues to report a home exercise routine. She continues to report benefit with Percocet. She denies any adverse effects. She denies any other health changes.    Pain Disability Index Review:      12/18/2024     9:59 AM 5/29/2024     3:08 PM 10/23/2023     3:37 PM   Last 3 PDI Scores   Pain Disability Index (PDI) [phone]       Pain Medications:  Percocet 10/325 mg TID PRN pain    Opioid Contract: yes     report:  Reviewed and consistent with medication use as prescribed.    Pain Procedures:   steroid inj  and visco-supplementation (series of 3) in left knee- not helpful  3/31/14 Bilateral genicular nerve blocks  2/16/16 Bilateral L2,3,4,5 MBB  3/1/16 Bilateral L2,3,4,5 MBB   4/6/16 Left L2,3,4,5 RFA- significant relief  4/20/16 Right L2,3,4,5 RFA- significant relief  10/5/16 Left L2,3,4,5 cooled RFA  10/19/16 Right L2,3,4,5 cooled RFA  4/26/17 Left L2,3,4,5 cooled RFA  5/9/17 Right L2,3,4,5 cooled RFA  12/26/2017- Left L2,3,4,5 cooled RFA  1/9/2018- Right L2,3,4,5 cooled RFA  6/26/18 Left L2,3,4,5 cooled RFA- 60% relief  7/10/18 Right L2,3,4,5 cooled RFA- 60% relief  9/28/18 TPIs- significant relief  12/27/18 Left L2,3,4,5 RFA- 70% relief  1/29/19 Right L2,3,4,5 RFA- 70% relief  6/18/19 Left L2,3,4,5 RFA- 70% relief  7/9/19 Right L2,3,4,5 RFA- 50% relief  12/19/19 Left L2,3,4,5 RFA- 80% relief  12/31/19 Right L2,3,4,5 RFA- 50% relief  3/2/2020- Right genicular nerve block- 70% relief for one month  3/12/20 Left subacromial bursa injection- 90% relief  5/18/2020- Left genicular nerve block- 70%  6/9/2020- Bilateral SI joint injections- 50% relief  10/13/2020- Right genicular RFA- 50% relief   11/3/2020- Left genicular RFA- 50% relief  12/15/2020- Left L2,3,4,5 RFA  12/29/2020- Right L2,3,4,5 RFA  4/26/2021- Left subacromial bursa'  5/11/2021- Bilateral SI joint injections   6/28/2021- Left genicular RFA  7/13/2021- Right genicular RFA  8/24/2021- Right L2,3,4,5 RFA  9/11/2021- Left L2,3,4,5 RFA  3/7/2022- Right L2,3,4,5 RFA  3/21/2022- Left L2,3,4,5 RFA  5/9/2022- Right genicular RFA  5/23/2022- Left genicular RFA  12/12/2022- Right genicular RFA  1/9/2023- Left genicular RFA  3/6/2023- Right L3,4,5 RFA  7/24/23 L5/S1 IL KERI- 50% relief  11/27/2023- Right L3,4,5 RFA  1/22/2024 - Right Genicular - 60% relief  2/12/2024 - Left L3, L4, L5 RFA - 60% relief   5/16/2024 - Bilateral SIJ and GTB - 70% relief  1/13/25 L5/S1 IL KERI- 60% relief    Physical Therapy/Home Exercise: yes    Imaging:   MRI Lumbar Spine  7/12/2023:  COMPARISON:  10/6/22.     FINDINGS:  Alignment: Grade 1 anterolisthesis at L3-L4 and L4-L5.  No evidence for spondylolysis.     Vertebrae: Degenerative endplate changes throughout the lower lumbar spine.  Hemangioma noted within the L5 vertebral body.  No fracture or marrow infiltrative process.     Discs: Moderate disc height loss at L3-S1.  No evidence for discitis.     Cord: Conus terminates at T12-L1 and appears unremarkable.  Cauda equina appears unremarkable.     Degenerative findings:     T12-L1: No spinal canal stenosis or neuroforaminal narrowing.     L1-L2: Mild facet arthropathy results in mild right neural foraminal narrowing.     L2-L3: Circumferential disc bulge and mild facet arthropathy result in mild effacement of the thecal sac and mild bilateral neural foraminal narrowing.     L3-L4: Uncovering of the intervertebral disc with bulge and severe facet arthropathy result in severe spinal canal stenosis and severe left, moderate right neural foraminal narrowing.     L4-L5: Uncovering of the intervertebral disc with bulge and severe facet arthropathy result in severe spinal canal stenosis and severe left, moderate right neural foraminal narrowing.     L5-S1: Circumferential disc bulge and mild facet arthropathy.  No spinal canal stenosis or neural foraminal narrowing.     Paraspinal muscles & soft tissues: Mild paraspinal muscle atrophy.  Partially visualized markedly enlarged leiomyomatous uterus noted.  There is asymmetric enlargement of the right kidney.     Impression:     1. Multilevel advanced degenerative change of the lumbar spine.  Severe spinal canal stenosis and moderate to severe neural foraminal narrowing noted at L3-L5.  2. Partially visualized markedly enlarged leiomyomatous uterus.  Recommend further evaluation pelvic ultrasound.  3. Asymmetric enlargement of the right kidney.  Recommend further evaluation with retroperitoneal ultrasound.  This report was flagged in Epic as  abnormal.    Xray Knee 3/6/2019:  FINDINGS:  Postop bilateral knee replacements.  The the the the irregularity about the left patella with soft tissue calcifications similar.  Small joint effusion.  Some irregularity of the right patella unchanged as well.     IMPRESSION:      Postop bilateral knee replacement with irregularity about the patella as above.    MRI Cervical Spine 4/24/2018:  FINDINGS:  There is reversal of the normal cervical lordosis which could be related to patient positioning versus muscle spasm.     Cervical vertebral body heights are well maintained without evidence of acute fracture or dislocation.  Marrow signal is unremarkable.     Spinal canal contents are unremarkable.  No abnormal masses or collections.     Individual levels as detailed below:     C2-3: No significant spinal canal stenosis or neuroforaminal narrowing.     C3-4: Facet arthropathy.  No significant spinal canal stenosis or neuroforaminal narrowing.     C4-5: Facet arthropathy.  Small broad-based disc osteophyte complex.  Mild right neuroforaminal narrowing.  Mild spinal canal stenosis.     C5-6: Facet arthropathy.  Uncovertebral joint spurring.  Broad-based posterior disc osteophyte complex.  Mild right neuroforaminal narrowing.  Mild spinal canal stenosis.     C6-7: Facet arthropathy.  No significant spinal canal stenosis or neuroforaminal narrowing.     C7-T1: No significant spinal canal stenosis or neuroforaminal narrowing.     Paraspinal muscles are unremarkable.  Soft tissues are intact.     IMPRESSION:      Mild multilevel cervical spondylosis with mild spinal canal stenosis and mild right neuroforaminal narrowing at C4-5 and C5-6.    Xray Lumbar Spine 9/21/2015:  Results: AP, lateral neutral, lateral flexion , lateral extension, bilateral oblique and spot views.  The alignment of the lumbar spine demonstrates a mild levoscoliosis .  11-mm anterior listhesis of L4 relative to L5 with no translational abnormalities   seen  on flexion and extension views.  The vertebral body heights  are well-maintained  , mild disk space narrowing L4-L5 and L5-S1.   Mild anterior and marginal osteophyte formation seen  throughout the lumbar spine  .  The oblique views demonstrate no   definite spondylolysis.  There is facet joint osseous hypertrophy noted at L3-L4 and L4-L5.  IMPRESSION:         The Significant spondylosis of the lumbar spine with grade 1 anterior listhesis L4 relative to L5.  Facet joint osseous hypertrophy L3-L4 and L4-5.    Allergies:   Review of patient's allergies indicates:   Allergen Reactions    Strawberry Anaphylaxis       Current Medications:   Current Outpatient Medications   Medication Sig Dispense Refill    acetaminophen (TYLENOL) 650 MG TbSR Take 1 tablet (650 mg total) by mouth every 8 (eight) hours as needed (pain). 90 tablet 1    albuterol (PROVENTIL/VENTOLIN HFA) 90 mcg/actuation inhaler INHALE TWO PUFFS INTO LUNGS EVERY 4 TO 6 HOURS AS NEEDED FOR SHORTNESS OF BREATH AND FOR WHEEZING 54 g 3    allopurinoL (ZYLOPRIM) 300 MG tablet Take 1 tablet (300 mg total) by mouth 2 (two) times daily. 180 tablet 3    apixaban (ELIQUIS) 5 mg Tab Take 1 tablet (5 mg total) by mouth 2 (two) times daily. 60 tablet 6    atorvastatin (LIPITOR) 80 MG tablet Take 1 tablet (80 mg total) by mouth once daily. 90 tablet 3    b complex vitamins tablet Take 1 tablet by mouth every other day.       blood pressure monitor (BLOOD PRESSURE KIT) Kit 1 kit by Misc.(Non-Drug; Combo Route) route Daily. Check blood pressure daily 1 each 0    calcium citrate/vitamin D2 (ISIAH-CITRATE ORAL) Take 500 mg by mouth once daily.      colchicine (MITIGARE) 0.6 mg Cap One daily as needed for gout flare (Patient taking differently: One daily) 90 capsule 1    cyanocobalamin (VITAMIN B-12) 1000 MCG tablet Take 100 mcg by mouth every morning.      FLUoxetine 40 MG capsule Take 1 capsule (40 mg total) by mouth once daily. 90 capsule 3    fluticasone propionate (FLONASE)  50 mcg/actuation nasal spray 1 SPRAY BY EACH NOSTRIL ROUTE 2 TIMES DAILY AS NEEDED FOR RHINITIS. 48 g 3    indomethacin (INDOCIN) 50 MG capsule Take 1 capsule (50 mg total) by mouth 2 (two) times daily as needed (with meals). 30 capsule 0    LIDOcaine (XYLOCAINE) 5 % Oint ointment APPLY TOPICALLY AS NEEDED. 35.44 g 6    LIDOcaine-prilocaine (EMLA) cream APPLY TOPICALLY AS NEEDED. FOR FOOT PAIN 30 g 2    metoprolol succinate (TOPROL-XL) 25 MG 24 hr tablet TAKE 1/2 TABLET (12.5 MG TOTAL) BY MOUTH ONCE DAILY.      midodrine (PROAMATINE) 5 MG Tab One daily      MULTIVIT-IRON-MIN-FOLIC ACID 3,500-18-0.4 UNIT-MG-MG ORAL CHEW Take 1 tablet by mouth 2 (two) times daily.       naloxone (NARCAN) 4 mg/actuation Spry 4mg by nasal route as needed for opioid overdose; may repeat every 2-3 minutes in alternating nostrils until medical help arrives. Call 911 1 each 11    nystatin (MYCOSTATIN) powder Apply topically 2 (two) times daily. 60 g 3    oxybutynin (DITROPAN-XL) 5 MG TR24 Take 1 tablet (5 mg total) by mouth once daily. 90 tablet 3    oxyCODONE-acetaminophen (PERCOCET)  mg per tablet Take 1 tablet by mouth every 6 to 8 hours as needed for Pain (for severe pain). 105 tablet 0    prednisoLONE acetate (PRED FORTE) 1 % DrpS Place 1 drop into the right eye 3 (three) times daily.      semaglutide (OZEMPIC) 1 mg/dose (4 mg/3 mL) Inject 1 mg into the skin every 7 days. 3 mL 11    senna-docusate 8.6-50 mg (SENNA WITH DOCUSATE SODIUM) 8.6-50 mg per tablet Take 1 tablet by mouth once daily. 30 tablet 0    urea (CARMOL) 40 % Crea Apply topically once daily. To dry skin on the feet. 120 g 5    vitamin D 185 MG Tab Take 5,000 mg by mouth every morning.        No current facility-administered medications for this visit.     Facility-Administered Medications Ordered in Other Visits   Medication Dose Route Frequency Provider Last Rate Last Admin    0.9%  NaCl infusion   Intravenous Continuous Lina Wagner MD 25 mL/hr at 12/15/20  1506 25 mL/hr at 12/15/20 1506    0.9%  NaCl infusion   Intravenous Continuous Crio Chung MD        0.9%  NaCl infusion   Intravenous Continuous Santos Barron MD           REVIEW OF SYSTEMS:    GENERAL:  No weight loss, malaise or fevers.  HEENT:  Negative for frequent or significant headaches.  NECK:  Negative for lumps, goiter, pain and significant neck swelling.  RESPIRATORY:  Negative for cough, wheezing or shortness of breath.  CARDIOVASCULAR:  Negative for chest pain, leg swelling or palpitations. HTN  GI:  Negative for abdominal discomfort, blood in stools or black stools or change in bowel habits. GERD  MUSCULOSKELETAL:  See HPI.  SKIN:  Negative for lesions, rash, and itching.  PSYCH:  Negative for sleep disturbance, mood disorder and recent psychosocial stressors.  HEMATOLOGY/LYMPHOLOGY:  Negative for prolonged bleeding, bruising easily or swollen nodes.  NEURO:   No history of headaches, syncope, paralysis, seizures or tremors.  ENDO: Diabetes  All other reviewed and negative other than HPI.    Past Medical History:  Past Medical History:   Diagnosis Date    Acute right MCA stroke 01/04/2024    Allergy     Anemia     Asthma     Bilateral shoulder bursitis     Cervical stenosis of spine     Chronic pain     DDD (degenerative disc disease), cervical     Depression 01/28/2019    Diabetes mellitus type II     DJD (degenerative joint disease) of knee 06/19/2014    Facet arthritis of lumbar region 12/17/2015    Facet syndrome 12/17/2015    GERD (gastroesophageal reflux disease)     Heartburn     Hyperlipidemia     Hypertension     Morbid obesity     Neuromuscular disorder     PADDY (obstructive sleep apnea)     Paroxysmal atrial fibrillation 03/19/2024    Proteinuria     Right carpal tunnel syndrome     Sacroiliitis 06/13/2018    Sacroiliitis     Sleep apnea     Uterine fibroid        Past Surgical History:  Past Surgical History:   Procedure Laterality Date    CARPAL TUNNEL RELEASE      CARPAL TUNNEL  RELEASE  1980s    left    CARPAL TUNNEL RELEASE  2012    right    CATARACT EXTRACTION W/  INTRAOCULAR LENS IMPLANT Left 08/08/2023    DR. STOKES    CATARACT EXTRACTION W/  INTRAOCULAR LENS IMPLANT Right 08/29/2023        COLONOSCOPY N/A 06/15/2020    Procedure: COLONOSCOPY;  Surgeon: Jc Koo MD;  Location: [facility] ENDO (4TH FLR);  Service: Endoscopy;  Laterality: N/A;  COVID screening on 6/13/20 at [facility]  pt updated on drop off location and no visitor policy-rb    EPIDURAL STEROID INJECTION N/A 07/24/2023    Procedure: INJECTION, STEROID, EPIDURAL, L5/S1 SOONER DATE;  Surgeon: Israel Hurtado MD;  Location: [facility] PAIN MGT;  Service: Pain Management;  Laterality: N/A;    ESOPHAGOGASTRODUODENOSCOPY N/A 03/27/2019    Procedure: EGD (ESOPHAGOGASTRODUODENOSCOPY);  Surgeon: Robbin Bar MD;  Location: [facility] (2ND FLR);  Service: Endoscopy;  Laterality: N/A;  BMI 61.3/2nd floor case/svn    INJECTION OF JOINT Bilateral 06/09/2020    Procedure: INJECTION, JOINT, BILATERAL SI;  Surgeon: Lina Wagner MD;  Location: [facility] PAIN MGT;  Service: Pain Management;  Laterality: Bilateral;  B/L SI Joint Injection    INJECTION OF JOINT Bilateral 05/11/2021    Procedure: INJECTION, JOINT, SACROILIAC (SI) need consent;  Surgeon: Lina Wagner MD;  Location: [facility] PAIN MGT;  Service: Pain Management;  Laterality: Bilateral;    INJECTION OF JOINT Bilateral 5/16/2024    Procedure: INJECTION, JOINT BILATERAL SI AND GTB;  Surgeon: Israel Hurtado MD;  Location: [facility] PAIN MGT;  Service: Pain Management;  Laterality: Bilateral;  [phone]  2 WK F/U BENJI    JOINT REPLACEMENT Bilateral     with 2 revisions on rt    KNEE SURGERY  03/2010    orthroscope    KNEE SURGERY  06/19/2014    left TKR    LAPAROSCOPIC SLEEVE GASTRECTOMY N/A 08/28/2019    Procedure: GASTRECTOMY, SLEEVE, LAPAROSCOPIC with intraop EGD;  Surgeon: Heriberto Clements MD;  Location: [facility] OR 2ND FLR;  Service: General;  Laterality:  N/A;    PANNICULECTOMY N/A 06/14/2022    Procedure: PANNICULECTOMY;  Surgeon: Rhett Gray MD;  [facility];  Service: Plastics;  Laterality: N/A;    RADIOFREQUENCY ABLATION Right 07/09/2019    Procedure: RADIOFREQUENCY ABLATION;  Surgeon: Lina Wagner MD;  Location: [facility] PAIN MGT;  Service: Pain Management;  Laterality: Right;  RIGHT RFA L2,3,4,5  2 of 2    RADIOFREQUENCY ABLATION Left 12/19/2019    Procedure: RADIOFREQUENCY ABLATION, LEFT L2-L3-L4-L5 MEDIAL BRANCH 1 OF 2;  Surgeon: Lina Wagner MD;  Location: [facility] PAIN MGT;  Service: Pain Management;  Laterality: Left;    RADIOFREQUENCY ABLATION Right 12/31/2019    Procedure: RADIOFREQUENCY ABLATION, RIGHT L2-L3-L4-L5  2 OF 2;  Surgeon: Lina Wagner MD;  Location: [facility] PAIN MGT;  Service: Pain Management;  Laterality: Right;    RADIOFREQUENCY ABLATION Right 10/13/2020    Procedure: RADIOFREQUENCY ABLATION, Genicular Cooled 1 of 2;  Surgeon: Lina Wagner MD;  Location: [facility] PAIN MGT;  Service: Pain Management;  Laterality: Right;    RADIOFREQUENCY ABLATION Left 11/03/2020    Procedure: RADIOFREQUENCY ABLATION, GENICULAR COOLED  2 OF 2;  Surgeon: Lina Wagner MD;  Location: [facility] PAIN MGT;  Service: Pain Management;  Laterality: Left;    RADIOFREQUENCY ABLATION Left 12/15/2020    Procedure: RADIOFREQUENCY ABLATION LEFT L2,3,4,5 1 of 2;  Surgeon: Lina Wagner MD;  Location: [facility] PAIN MGT;  Service: Pain Management;  Laterality: Left;    RADIOFREQUENCY ABLATION Right 12/29/2020    Procedure: RADIOFREQUENCY ABLATION RIGHT L2,3,4,5 2 of 2;  Surgeon: Lina Wagner MD;  Location: [facility] PAIN MGT;  Service: Pain Management;  Laterality: Right;    RADIOFREQUENCY ABLATION Left 06/29/2021    Procedure: RADIOFREQUENCY ABLATION GENICULAR COOLED;  Surgeon: Lina Wagner MD;  Location: [facility] PAIN MGT;  Service: Pain Management;  Laterality: Left;  1 of 2    RADIOFREQUENCY ABLATION Right 07/13/2021    Procedure:  RADIOFREQUENCY ABLATION GENICULAR;  Surgeon: Lina Wagner MD;  Location: [facility] [place] PAIN MGT;  Service: Pain Management;  Laterality: Right;  2 of 2    RADIOFREQUENCY ABLATION Right 08/24/2021    Procedure: RADIOFREQUENCY ABLATION, L2-L3-L4-L5 MEDIAL BRANCH 1 OF 2;  Surgeon: Lina Wagner MD;  Location: [facility] [place] PAIN MGT;  Service: Pain Management;  Laterality: Right;    RADIOFREQUENCY ABLATION Left 09/11/2021    Procedure: RADIOFREQUENCY ABLATION, L2-L3-L4-L5 MEDIAL BR ANCH 2 OF 2;  Surgeon: Lina Wagner MD;  Location: [facility] [place] PAIN MGT;  Service: Pain Management;  Laterality: Left;    RADIOFREQUENCY ABLATION Right 03/07/2022    Procedure: RADIOFREQUENCY ABLATION RIGHT L3,4,5 1 of 2, needs consent;  Surgeon: Israel Hurtado MD;  Location: [facility] [place] PAIN MGT;  Service: Pain Management;  Laterality: Right;    RADIOFREQUENCY ABLATION Left 03/21/2022    Procedure: RADIOFREQUENCY ABLATION RIGHT L3,4,5 2 of 2, needs consent;  Surgeon: Israel Hurtado MD;  Location: [facility] [place] PAIN MGT;  Service: Pain Management;  Laterality: Left;    RADIOFREQUENCY ABLATION Right 05/09/2022    Procedure: RADIOFREQUENCY ABLATION RIGHT GENICULAR COOLED 1 of 2;  Surgeon: Israel Hurtado MD;  Location: [facility] [place] PAIN MGT;  Service: Pain Management;  Laterality: Right;    RADIOFREQUENCY ABLATION Left 05/23/2022    Procedure: RADIOFREQUENCY ABLATION LEFT GENICULAR COOLED  2 of 2;  Surgeon: Israel Hurtado MD;  Location: [facility] [place] PAIN MGT;  Service: Pain Management;  Laterality: Left;    RADIOFREQUENCY ABLATION Right 12/12/2022    Procedure: RADIOFREQUENCY ABLATION RIGHT GENICULAR COOLED  ONE OF TWO  NEEDS CONSENT;  Surgeon: Israel Hurtado MD;  Location: [facility] [place] PAIN MGT;  Service: Pain Management;  Laterality: Right;    RADIOFREQUENCY ABLATION Left 01/09/2023    Procedure: RADIOFREQUENCY ABLATION LEFT GENICULAR COOLED  TWO OF TWO NEEDS CONSENT;  Surgeon: Israel Hurtado MD;  Location: [facility] [place] PAIN MGT;  Service: Pain Management;  Laterality: Left;    RADIOFREQUENCY ABLATION Right  03/06/2023    Procedure: RADIOFREQUENCY ABLATION RIGHT L3,L4,L5;  Surgeon: Israel Hurtado MD;  Location: [facility] PAIN MGT;  Service: Pain Management;  Laterality: Right;    RADIOFREQUENCY ABLATION Left 05/22/2023    Procedure: RADIOFREQUENCY ABLATION LEFT L3,L4,L5;  Surgeon: Israel Hurtado MD;  Location: [facility] PAIN MGT;  Service: Pain Management;  Laterality: Left;  4/3 LM TO R/S    RADIOFREQUENCY ABLATION Right 11/27/2023    Procedure: RADIOFREQUENCY ABLATION RIGHT L3, 4, 5 1 OF 3;  Surgeon: Israel Hurtado MD;  Location: [facility] PAIN MGT;  Service: Pain Management;  Laterality: Right;    RADIOFREQUENCY ABLATION Right 01/22/2024    Procedure: RADIOFREQUENCY ABLATION RIGHT GENICULAR 3 NERVES 3 OF 3;  Surgeon: Israel Hurtado MD;  Location: [facility] PAIN MGT;  Service: Pain Management;  Laterality: Right;    RADIOFREQUENCY ABLATION Left 02/12/2024    Procedure: RADIOFREQUENCY ABLATION LEFT L3, 4, 5;  Surgeon: Israel Hurtado MD;  Location: [facility] PAIN MGT;  Service: Pain Management;  Laterality: Left;  [phone]    RADIOFREQUENCY ABLATION OF LUMBAR MEDIAL BRANCH NERVE AT SINGLE LEVEL Left 06/26/2018    Procedure: RADIOFREQUENCY ABLATION, NERVE, MEDIAL BRANCH, LUMBAR, 1 LEVEL;  Surgeon: Lina Wagner MD;  Location: [facility] PAIN MGT;  Service: Pain Management;  Laterality: Left;  Left Cooled RFA @ L2,3,4,5  [phone]  with Sedation    1 of 2    RADIOFREQUENCY ABLATION OF LUMBAR MEDIAL BRANCH NERVE AT SINGLE LEVEL Right 07/10/2018    Procedure: RADIOFREQUENCY ABLATION, NERVE, MEDIAL BRANCH, LUMBAR, 1 LEVEL;  Surgeon: Lina Wagner MD;  Location: [facility] PAIN MGT;  Service: Pain Management;  Laterality: Right;  RIght Cooled RFA @ L2,3,4,5  [phone] with Sedation    2 of 2    REVISION OF KNEE ARTHROPLASTY Right 7/18/2024    Procedure: REVISION, ARTHROPLASTY, KNEE: RIGHT;  Surgeon: Theodore Swan MD;  Location: [facility] OR 2ND FLR;  Service: Orthopedics;  Laterality: Right;    TRANSFORAMINAL EPIDURAL INJECTION OF STEROID N/A  1/13/2025    Procedure: LUMBAR L5/S1 IL KERI *ELIQUIS CLEARANCE IN CHART*;  Surgeon: Israel Hurtado MD;  Location: [facility] PAIN MGT;  Service: Pain Management;  Laterality: N/A;  2 WK F/U SUNDEEP    TRIGGER FINGER RELEASE Right 2017    TRIGGER FINGER RELEASE Left 07/29/2019    Procedure: RELEASE, TRIGGER FINGER, Left Thumb;  Surgeon: Velma Garcia MD;  Location: [facility] OR;  Service: Orthopedics;  Laterality: Left;  Local w/ MAC       Family History:  Family History   Problem Relation Name Age of Onset    Diabetes Mother      Cataracts Mother      Diabetes Father      Cataracts Father      Hypertension Sister      Diabetes Sister      No Known Problems Sister      Cancer Sister          lymphoma     Coronary artery disease Brother      Diabetes Brother      Diabetes Brother      Hypotension Brother      Kidney failure Brother      Hypotension Brother      Amblyopia Neg Hx      Blindness Neg Hx      Glaucoma Neg Hx      Macular degeneration Neg Hx      Retinal detachment Neg Hx      Strabismus Neg Hx      Stroke Neg Hx      Thyroid disease Neg Hx      Anesthesia problems Neg Hx         Social History:  Social History     Socioeconomic History    Marital status:    Tobacco Use    Smoking status: Never     Passive exposure: Never    Smokeless tobacco: Never   Substance and Sexual Activity    Alcohol use: Not Currently     Comment: occasionally     Drug use: No    Sexual activity: Yes     Partners: Female     Birth control/protection: None   Social History Narrative    Disabled. The patient is the youngest of 6 siblings.  Lives with single-sex partner.                  Social Drivers of Health     Financial Resource Strain: Low Risk  (7/19/2024)    Overall Financial Resource Strain (CARDIA)     Difficulty of Paying Living Expenses: Not very hard   Food Insecurity: No Food Insecurity (7/19/2024)    Hunger Vital Sign     Worried About Running Out of Food in the Last Year: Never true     Ran Out of Food in the Last  Year: Never true   Transportation Needs: No Transportation Needs (7/19/2024)    TRANSPORTATION NEEDS     Transportation : No   Physical Activity: Inactive (7/19/2024)    Exercise Vital Sign     Days of Exercise per Week: 0 days     Minutes of Exercise per Session: 0 min   Stress: No Stress Concern Present (7/19/2024)    [employer] - Occupational Stress Questionnaire     Feeling of Stress : Not at all   Housing Stability: Low Risk  (7/19/2024)    Housing Stability Vital Sign     Unable to Pay for Housing in the Last Year: No     Homeless in the Last Year: No       OBJECTIVE:      Vitals:    01/27/25 1320   BP: 135/81   Pulse: 76   Resp: 18   Temp: 98.8 °F (37.1 °C)   SpO2: 98%   Weight: 116.2 kg (256 lb 2.8 oz)   PainSc:   8          PHYSICAL EXAMINATION :    GENERAL: Well appearing, in no acute distress, alert and oriented x3.   PSYCH:  Mood and affect appropriate.   SKIN: Skin color, texture, turgor normal, no rashes or lesions. Well-healing right knee scar.   HEAD/FACE:  Normocephalic, atraumatic.   CV: Rate regular.  PULM: No evidence of respiratory difficulty, symmetric chest rise.  BACK: Negative SLR bilaterally, but positive to posterior thigh tightness and pain. There is pain with palpation over midline lumbar spine. Limited ROM with pain on flexion > extension. Mild tenderness over bilateral SIJ. Positive bilateral facet loading. +Millgrams bilaterally. Negative FABERE. Negative FADIR.  EXTREMITIES: No edema.  Mild TTP over bilateral GTB.   MUSCULOSKELETAL: 4/5 strength in right ankle with plantar and dorsiflexion. 4/5 strength in left ankle with plantar and dorsiflexion. 5/5 strength with right knee flexion and extension. 5/5 strength with left knee flexion and extension. 5/5 strength in right EHL, 5/5 strength in left EHL.  NEURO:  Plantar response are downgoing. No clonus. Normal sensation.   GAIT: Antalgic- ambulates without assistance.    ASSESSMENT: 59 y.o. year old female  with low back and knee pain, consistent with the followin. Chronic pain syndrome        2. Primary osteoarthritis of both knees        3. Spondylosis of lumbar region without myelopathy or radiculopathy        4. Lumbar spondylosis        5. Degeneration of intervertebral disc of lumbar region with discogenic back pain        6. S/P revision of total knee, right              PLAN:   We discussed with the patient the assessment and recommendations. The following is the plan we agreed on:     - Previous imaging reviewed.   - Currently on Percocet 10/325 mg QID PRN. Last fill 2024. Will decrease patient to three times a day #90 at next refill.  - The patient is here today for a refill of current pain medications and they believe these provide effective pain control and improvements in quality of life.  The patient notes no serious side effects, and feels the benefits outweigh the risks.  The patient was reminded of the pain contract that they signed previously as well as the risks and benefits of the medication including possible death.  The updated [place] [employer] prescription monitoring program was reviewed, and the patient has been found to be compliant with current treatment plan.    - UDS 2024 reviewed and appropriate.   - Continue HEP and PT. Continue aquatic therapy.   - RTC in 3 months or sooner if needed.      The above plan and management options were discussed at length with patient. Patient is in agreement with the above and verbalized understanding.     Virginia Sanchez, EMILY  2025

## 2025-01-28 NOTE — TELEPHONE ENCOUNTER
Called and spoke with patient and she stated she will pay $42.00 to have toenails cut tomorrow.              ----- Message from Ibeth Finney sent at 8/12/2020  8:20 AM CDT -----  Contact: DONTAE MOON [[id number]]  Name of Who is Calling: DONTAE OMON [[id number]]      What is the request in detail: Patient has not seen PCP within the last six months. Patient had A1C checked in April. Patient would like to know if she can still be seen tomorrow. Please call       Can the clinic reply by [facility]: no      What Number to Call Back if not in [facility]: [phone] (home)                                         
Alyson Irene

## 2025-02-03 ENCOUNTER — HOSPITAL ENCOUNTER (OUTPATIENT)
Dept: RADIOLOGY | Facility: HOSPITAL | Age: 60
Discharge: HOME | End: 2025-02-03
Attending: INTERNAL MEDICINE
Payer: MEDICARE

## 2025-02-03 VITALS — HEIGHT: 65 IN | BODY MASS INDEX: 42.65 KG/M2 | WEIGHT: 256 LBS

## 2025-02-03 DIAGNOSIS — Z12.31 ENCOUNTER FOR SCREENING MAMMOGRAM FOR BREAST CANCER: ICD-10-CM

## 2025-02-03 PROCEDURE — 77067 SCR MAMMO BI INCL CAD: CPT | Mod: 26,,, | Performed by: RADIOLOGY

## 2025-02-03 PROCEDURE — 77063 BREAST TOMOSYNTHESIS BI: CPT | Mod: 26,,, | Performed by: RADIOLOGY

## 2025-02-03 PROCEDURE — 77067 SCR MAMMO BI INCL CAD: CPT | Mod: TC

## 2025-02-10 ENCOUNTER — PATIENT MESSAGE (OUTPATIENT)
Dept: BARIATRICS | Facility: CLINIC | Age: 60
End: 2025-02-10
Payer: MEDICARE

## 2025-02-14 DIAGNOSIS — M51.369 DDD (DEGENERATIVE DISC DISEASE), LUMBAR: ICD-10-CM

## 2025-02-14 DIAGNOSIS — G89.29 CHRONIC PAIN OF BOTH KNEES: ICD-10-CM

## 2025-02-14 DIAGNOSIS — M25.561 CHRONIC PAIN OF BOTH KNEES: ICD-10-CM

## 2025-02-14 DIAGNOSIS — M25.562 CHRONIC PAIN OF BOTH KNEES: ICD-10-CM

## 2025-02-14 DIAGNOSIS — G89.4 CHRONIC PAIN SYNDROME: ICD-10-CM

## 2025-02-14 DIAGNOSIS — M17.0 PRIMARY OSTEOARTHRITIS OF BOTH KNEES: ICD-10-CM

## 2025-02-14 DIAGNOSIS — M47.816 SPONDYLOSIS OF LUMBAR REGION WITHOUT MYELOPATHY OR RADICULOPATHY: ICD-10-CM

## 2025-02-17 ENCOUNTER — OFFICE VISIT (OUTPATIENT)
Dept: BARIATRICS | Facility: CLINIC | Age: 60
End: 2025-02-17
Payer: MEDICARE

## 2025-02-17 DIAGNOSIS — Z86.39 HISTORY OF MORBID OBESITY: ICD-10-CM

## 2025-02-17 DIAGNOSIS — G89.4 CHRONIC PAIN SYNDROME: ICD-10-CM

## 2025-02-17 DIAGNOSIS — M51.369 DDD (DEGENERATIVE DISC DISEASE), LUMBAR: ICD-10-CM

## 2025-02-17 DIAGNOSIS — G89.29 CHRONIC PAIN OF BOTH KNEES: ICD-10-CM

## 2025-02-17 DIAGNOSIS — E11.9 TYPE 2 DIABETES MELLITUS WITHOUT COMPLICATION, WITHOUT LONG-TERM CURRENT USE OF INSULIN: ICD-10-CM

## 2025-02-17 DIAGNOSIS — M47.816 SPONDYLOSIS OF LUMBAR REGION WITHOUT MYELOPATHY OR RADICULOPATHY: ICD-10-CM

## 2025-02-17 DIAGNOSIS — M25.562 CHRONIC PAIN OF BOTH KNEES: ICD-10-CM

## 2025-02-17 DIAGNOSIS — Z98.84 OBESITY SURGERY STATUS: ICD-10-CM

## 2025-02-17 DIAGNOSIS — M25.561 CHRONIC PAIN OF BOTH KNEES: ICD-10-CM

## 2025-02-17 DIAGNOSIS — G47.33 OSA (OBSTRUCTIVE SLEEP APNEA): ICD-10-CM

## 2025-02-17 DIAGNOSIS — K21.9 GASTROESOPHAGEAL REFLUX DISEASE, UNSPECIFIED WHETHER ESOPHAGITIS PRESENT: ICD-10-CM

## 2025-02-17 DIAGNOSIS — I10 ESSENTIAL HYPERTENSION: ICD-10-CM

## 2025-02-17 DIAGNOSIS — M17.0 PRIMARY OSTEOARTHRITIS OF BOTH KNEES: ICD-10-CM

## 2025-02-17 DIAGNOSIS — Z90.3 S/P GASTRIC SLEEVE PROCEDURE: Primary | ICD-10-CM

## 2025-02-17 RX ORDER — OXYCODONE AND ACETAMINOPHEN 10; 325 MG/1; MG/1
1 TABLET ORAL
Qty: 105 TABLET | Refills: 0 | Status: SHIPPED | OUTPATIENT
Start: 2025-02-17

## 2025-02-17 RX ORDER — OXYCODONE AND ACETAMINOPHEN 10; 325 MG/1; MG/1
1 TABLET ORAL
Qty: 105 TABLET | Refills: 0 | OUTPATIENT
Start: 2025-02-17

## 2025-02-17 NOTE — PATIENT INSTRUCTIONS
Meal Ideas for Regular Bariatric Diet  *Recipes and products available at [email or web address]      Breakfast: (15-20g protein)    - Egg white omelet: 2 egg whites or ½ cup Egg Beaters. (Optional proteins: cheese, shrimp, black beans, chicken, sliced turkey) (Optional veggies: tomatoes, salsa, spinach, mushrooms, onions, green peppers, or small slice avocado)     - Egg and sausage: 1 egg or ¼ cup Egg Beaters (any variety), with 1 tirso or 2 links of Turkey sausage or Veggie breakfast sausage (Sportlyzer or YouGoDo)    - Crust-less breakfast quiche: To make a glass pie dish, mix 4oz part skim Ricotta, 1 cup skim milk, and 2 eggs as your base. Add protein: shredded cheese, sliced lean ham or turkey, turkey weiss/sausage. Add veggies: tomato, onion, green onion, mushroom, green pepper, spinach, etc.    - Yogurt parfait: Mix 1 - 6oz container Dannon Light N Fit vanilla yogurt, with ¼ cup crushed unsalted nuts    - Cottage cheese and fruit: ½ cup part-skim cottage cheese or ricotta cheese topped with fresh fruit or sugar free preserves     - Mera Krishna's Vanilla Egg custard* (add 2 Tbsp instant coffee granules to make Cappuccino Custard*)    - Hi-Protein café latte (skim milk, decaf coffee, 1 scoop protein powder). Optional to add Sugar free syrup or extract flavoring.    - Breakfast Lox: spread fat free cream cheese on slices of smoked salmon. Serve over scrambled or egg over easy (sauteed with nonstick cookspray) OR on a cucumber slice    - Eggwhich: Scramble or cook 1 large egg over easy using nonstick cookspray. Place between 2 slices of Wallisian weiss and low fat cheese.     Lunch: (20-30g protein)    - ½ cup Black bean soup (Homemade or Progresso), with ¼ cup shredded low-fat cheese. Top with chopped tomato or fresh salsa.     - Lean deli turkey breast and low-fat sliced cheese, mustard or light bravo to moisten, rolled up together, or wrapped in a Valeriy lettuce leaf    - Chicken salad made from dinner  leftovers, moisten with low-fat salad dressing or light bravo. Also try leftover salmon, shrimp, tuna or boiled eggs. Serve ½ cup over dark green salad    - Fat-free canned refried beans, topped with ¼ cup shredded low-fat cheese. Top with chopped tomato or fresh salsa.     - Greek salad: Top mixed greens with 1-2oz grilled chicken, tomatoes, red onions, 2-3 kalamata olives, and sprinkle lightly with feta cheese. Spritz with Balsamic vinegar to taste.     - Crust-less lunch quiche: To make a glass pie dish, mix 4oz part skim Ricotta, 1 cup skim milk, and 2 eggs as your base. Add protein: shredded cheese, sliced lean ham or turkey, shrimp, chicken. Add veggies: tomato, onion, green onion, mushroom, green pepper, spinach, artichoke, broccoli, etc.    - Pizza bake: spread a  evelin luis mushroom with tomato sauce, low-fat shredded mozzarella and turkey pepperoni or [place] weiss. Add any veggies. Roast for 10-15 minutes, until cheese melted.     - Cucumber crab bites: Spread ¼ cup crab dip (lump crabmeat + light cream cheese and green onions) over sliced cucumber.     - Chicken with light spinach and artichoke dip*: Puree in : 6oz cooked and drained spinach, 2 cloves garlic, 1 can cannelloni beans, ½ cup chopped green onions, 1 can drained artichoke hearts (not marinated in oil), lemon juice and basil. Mix in 2oz chopped up chicken.    Supper: (20-30g protein)    - Serve grilled fish over dark green salad tossed with low-fat dressing, served with grilled asparagus sevilla     - Rotisserie chicken salad: served with sliced strawberries, walnuts, fat-free feta cheese crumbles and 1 tbsp Andrades Own Light Raspberry [place] Vinaigrette    - Shrimp cocktail: Dip cold boiled shrimp in homemade low-sugar cocktail sauce (1/2 cup Keara One Carb ketchup, 2 tbsp horseradish, 1/4 tsp hot sauce, 1 tsp Worcestershire sauce, 1 tbsp freshly-squeezed lemon juice). Serve with dark green salad, walnuts, and crumbled blue  cheese drizzled with olive oil and Balsamic vinegar    - Tuna Melt: Spread tuna salad onto 2 thick slices of tomato. Top with low-fat cheese and broil until cheese is melted. May also be made with chicken salad of shrimp salad. [place] with different types of cheeses.    - Chicken or beef fajitas (no tortilla, rice, beans, chips). Top meat and veggies w/ fresh salsa, fat free sour cream.     - Homemade low-fat Chili using extra lean ground beef or ground turkey. Top with shredded cheese and salsa as desired. May add dollop fat-free sour cream if desired    - Chicken parmesan: Top chicken breast w/ low sugar marinara sauce, mozzarella cheese and bake until chicken reaches 165*.  Serve w/ spaghetti SQUASH or Serbian cut green beans    - Dinner Omelet with shrimp or chicken and onion, green peppers and chives.    - No noodle lasagna: Use sliced zucchini or eggplant in place of noodles.  Layer with part skim ricotta cheese and low sugar meat sauce (use very lean ground beef or ground turkey).    - Mexican chicken bake: Bake chunks of chicken breast or thigh with taco seasoning, Pace brand enchilada sauce, green onions and low-fat cheese. Serve with ¼ cup black beans or fat free refried beans topped with chopped tomatoes or salsa.    - Johnny frozen meatballs, simmered in Classico Marinara sauce. Different flavors of salsa or spaghetti sauce create different dishes! Sprinkle with parmesan cheese. Serve with grilled or steamed veggies, or a dark green salad.    - Simmer boneless skinless chicken thigh chunks in Classico Marinara sauce or roasted salsa until tender with chopped onion, bell pepper, garlic, mushrooms, spinach, etc.     - Hamburger or veggie burger, without the bun, dressed the way you like. Served with grilled or steamed veggies.    - Eggplant parmesan: Bake slices of eggplant at 350 degrees for 15 minutes. Layer tomato sauce, sliced eggplant and low-fat mozzarella cheese in a baking dish and cover with  foil. Bake 30-40 more minutes or until bubbly. Uncover and bake at 400 degrees for about 15 more minutes, or until top is slightly crisp.    - Fish tacos: grilled/baked white fish, wrapped in Valeriy lettuce leaf, topped with salsa, shredded low-fat cheese, and light coleslaw.    - Chicken jim: Sprinkle chicken w/ 1 tsp of hidden valley ranch dip mix. Then grill chicken and top with black beans, salsa and 1 tsp fat free sour cream.     - Cauliflower pizza crust: Use cauliflower as crust (see recipe on pinterest, no flour!). Top w/ low fat cheese, turkey pepperoni and veggies and bake again    - chicken or turkey crust pizza: use ground chicken or turkey instead of cauliflower, spread in [place] and bake at 350 for about 20-30 minutes(may want to add garlic, black pepper, oregano and other herbs to ground meat mixture).  Remove and top w/ low fat cheese, turkey pepperoni and veggies and bake again for another 10 minutes or until cheese is browned.     Snacks: (100-200 calories; >5g protein)    - 1 low-fat cheese stick with 8 cherry tomatoes or 1 serving fresh fruit  - 4 thin slices fat-free turkey breast and 1 slice low-fat cheese  - 4 thin slices fat-free honey ham with wedge of melon  - 6-8 edamame pods (equivalent to about 1/4 cup edamame without pods).   - 1/4 cup unsalted nuts with ½ cup fruit  - 6-oz container Dannon Light n Fit vanilla yogurt, topped with 1oz unsalted nuts         - apple, celery or baby carrots spread with 2 Tbsp PB2  - apple slices with 1 oz slice low-fat cheese  - Apple slices dipped in 2 Tbsp of PB2  - celery, cucumber, bell pepper or baby carrots dipped in ¼ cup hummus bean spread or light spinach and artichoke dip (*recipe in lunch section)  - celery, cucumber, baby carrots dipped in high protein greek yogurt (Mix 16 oz plain greek yogurt + 1 packet of hidden valley ranch dip mix)  - Catalino Links Beef Steak - 14g protein! (similar to beef jerky)  - 2 wedges Laughing Cow - Light Herb  & Garlic Cheese with sliced cucumber or green bell pepper  - 1/2 cup low-fat cottage cheese with ¼ cup fruit or ¼ cup salsa  - RTD Protein drinks: Atkins, Low Carb Slim Fast, EAS light, Muscle Milk Light, etc.  - Homemade Protein drinks: GNC Soy95, Isopure, Nectar, UNJURY, Whey Gourmet, etc. Mix 1 scoop powder with 8oz skim/1% milk or light soymilk.  - Protein bars: Atkins, EAS, Pure Protein, Think Thin, Detour, etc. Must have 0-4 grams sugar - Read the label.    Takeout Options: No more than twice/week  Deli - Salads (no pasta or rice), meats, cheeses. Roasted chicken. Lox (salmon)    Mexican - Platters which don't include tortillas, chips, or rice. Go easy on the beans. Example: Fajitas without the tortillas. Ask the  not to bring chips to the table if they are too tempting.    Greek - Meat or fish and vegetable, but no bread or rice. Including hummus, baba ganoush, etc, is OK. Most sit-down Greek restaurants can provide you with cucumber slices for dipping instead of jaye bread.    Fast Food (Avoid as much as possible) - Salads (no croutons and limit salad dressing to 2 tbsp), grilled chicken sandwich without the bun and ask for no bravo. Zuleikas low fat chili or Taco Bell pintos and cheese.    BBQ - The meats are fine if you ask for sauces on the side, but most of the traditional side dishes are loaded with carbs. Shubham slaw, baked beans and BBQ sauce are typically made with sugar.    Chinese - Nothing deep-fried, no rice or noodles. Many Chinese sauces have starch and sugar in them, so you'll have to use your judgement. If you find that these sauces trigger cravings, or cause Dumping, you can ask for the sauce to be made without sugar or just use soy sauce.

## 2025-02-17 NOTE — TELEPHONE ENCOUNTER
Patient requesting refill on percocet  Last office visit 1/27/25   shows last refill on 1/24/25  Patient does have a pain contract on file with [facility] department  Patient last UDS 12/18/24 was consistent with current therapy    CODEINE Not Detected Not Detected CM  Not Detected  Not Detected Not Detected   Comment: INTERPRETIVE INFORMATION: Codeine, U  Positive Cutoff: 40 ng/mL  Methodology: Mass Spectrometry   MORPHINE Not Detected Not Detected CM  Not Detected  Not Detected Not Detected   Comment: INTERPRETIVE INFORMATION:Morphine, U  Positive Cutoff: 20 ng/mL  Methodology: Mass Spectrometry   6-ACETYLMORPHINE Not Detected Not Detected CM  Not Detected  Not Detected Not Detected   Comment: INTERPRETIVE INFORMATION:6-acetylmorphine, U  Positive Cutoff: 20 ng/mL  Methodology: Mass Spectrometry   OXYCODONE Present Not Detected CM  Present  Present Present   Comment: INTERPRETIVE INFORMATION:Oxycodone, U  Positive Cutoff: 40 ng/mL  Methodology: Mass Spectrometry   NOROYXCODONE Present Present CM  Present  Present Present   Comment: INTERPRETIVE INFORMATION:Noroxycodone, U  Positive Cutoff: 100 ng/mL  Methodology: Mass Spectrometry   OXYMORPHONE Present Not Detected CM  Present  Present Present   Comment: INTERPRETIVE INFORMATION:Oxymorphone, U  Positive Cutoff: 40 ng/mL  Methodology: Mass Spectrometry   NOROXYMORPHONE Not Detected Not Detected CM  Not Detected  Not Detected Not Detected   Comment: INTERPRETIVE INFORMATION:Noroxymorphone, U  Positive Cutoff: 100 ng/mL  Methodology: Mass Spectrometry   HYDROCODONE Not Detected Not Detected CM  Not Detected  Not Detected Not Detected   Comment: INTERPRETIVE INFORMATION:Hydrocodone, U  Positive Cutoff: 40 ng/mL  Methodology: Mass Spectrometry   NORHYDROCODONE Not Detected Not Detected CM  Not Detected  Not Detected Not Detected   Comment: INTERPRETIVE INFORMATION:Norhydrocodone, U  Positive Cutoff: 100 ng/mL  Methodology: Mass Spectrometry    HYDROMORPHONE Not Detected Not Detected CM  Not Detected  Not Detected Not Detected   Comment: INTERPRETIVE INFORMATION:Hydromorphone, U  Positive Cutoff: 20 ng/mL  Methodology: Mass Spectrometry   BUPRENORPHINE Not Detected Not Detected CM  Not Detected  Not Detected Not Detected   Comment: INTERPRETIVE INFORMATION:Buprenorphine, U  Positive Cutoff: 5 ng/mL  Methodology: Mass Spectrometry   NORUBPRENORPHINE Not Detected Not Detected CM  Not Detected  Not Detected Not Detected   Comment: INTERPRETIVE INFORMATION:Norbuprenorphine, U  Positive Cutoff: 20 ng/mL  Methodology: Mass Spectrometry   FENTANYL Not Detected Not Detected CM  Not Detected  Not Detected Not Detected   Comment: INTERPRETIVE INFORMATION:Fentanyl, U  Positive Cutoff: 2 ng/mL  Methodology: Mass Spectrometry   NORFENTANYL Not Detected Not Detected CM  Not Detected  Not Detected Not Detected   Comment: INTERPRETIVE INFORMATION:Norfentanyl, U  Positive Cutoff: 2 ng/mL  Methodology: Mass Spectrometry   MEPERIDINE METABOLITE Not Detected Not Detected CM  Not Detected  Not Detected Not Detected   Comment: INTERPRETIVE INFORMATION:Meperidine metabolite, U  Positive Cutoff: 50 ng/mL  Methodology: Mass Spectrometry   TAPENTADOL Not Detected Not Detected CM  Not Detected  Not Detected Not Detected   Comment: INTERPRETIVE INFORMATION:Tapentadol, U  Positive Cutoff: 100 ng/mL  Methodology: Mass Spectrometry   TAPENTADOL-O-SULF Not Detected Not Detected CM  Not Detected  Not Detected Not Detected   Comment: INTERPRETIVE INFORMATION:Tapentadol-o-Sulf, U  Positive Cutoff: 200 ng/mL  Methodology: Mass Spectrometry   METHADONE Negative Negative CM  Not Detected  Not Detected Not Detected   Comment: Presumptive negative by immunoassay. Testing by mass  spectrometry is available on request.  INTERPRETIVE INFORMATION: Methadone Screen, U  Positive Cutoff: 150 ng/mL  Methodology: Immunoassay   TRAMADOL Negative Negative CM  Not Detected  Not Detected Not Detected    Comment: Presumptive negative by immunoassay. Testing by mass  spectrometry is available on request.  INTERPRETIVE INFORMATION:Tramadol Screen, U  Positive Cutoff: 100 ng/mL  Methodology: Immunoassay   AMPHETAMINE Not Detected Not Detected CM  Not Detected  Not Detected Not Detected   Comment: INTERPRETIVE INFORMATION:Amphetamine, U  Positive Cutoff: 50 ng/mL  Methodology: Mass Spectrometry   METHAMPHETAMINE Not Detected Not Detected CM  Not Detected  Not Detected Not Detected   Comment: INTERPRETIVE INFORMATION:Methamphetamine, U  Positive Cutoff: 200 ng/mL  Methodology: Mass Spectrometry   MDMA- ECSTASY Not Detected Not Detected CM  Not Detected  Not Detected Not Detected   Comment: INTERPRETIVE INFORMATION:MDMA, U  Positive Cutoff: 200 ng/mL  Methodology: Mass Spectrometry   MDA Not Detected Not Detected CM  Not Detected  Not Detected Not Detected   Comment: INTERPRETIVE INFORMATION:MDA, U  Positive Cutoff: 200 ng/mL  Methodology: Mass Spectrometry   MDEA- Kait Not Detected Not Detected CM  Not Detected  Not Detected Not Detected   Comment: INTERPRETIVE INFORMATION:MDEA, U  Positive Cutoff: 200 ng/mL  Methodology: Mass Spectrometry   METHYLPHENIDATE Not Detected Not Detected CM  Not Detected  Not Detected Not Detected   Comment: INTERPRETIVE INFORMATION:Methylphenidate, U  Positive Cutoff: 100 ng/mL  Methodology: Mass Spectrometry   PHENTERMINE Not Detected Not Detected CM  Not Detected  Not Detected Not Detected   Comment: INTERPRETIVE INFORMATION:Phentermine, U  Positive Cutoff: 100 ng/mL  Methodology: Mass Spectrometry   BENZOYLECGONINE Negative Negative CM  Not Detected  Not Detected Not Detected   Comment: Presumptive negative by immunoassay. Testing by mass  spectrometry is available on request.  INTERPRETIVE INFORMATION:Cocaine Screen, U  Positive Cutoff: 150 ng/mL  Methodology: Immunoassay   ALPRAZOLAM Not Detected Not Detected CM  Not Detected  Not Detected Not Detected   Comment: INTERPRETIVE  INFORMATION:Alprazolam, U  Positive Cutoff: 40 ng/mL  Methodology: Mass Spectrometry   ALPHA-OH-ALPRAZOLAM Not Detected Not Detected CM  Not Detected  Not Detected Not Detected   Comment: INTERPRETIVE INFORMATION:Alpha-OH-Alprazolam, U  Positive Cutoff: 20 ng/mL  Methodology: Mass Spectrometry   CLONAZEPAM Not Detected Not Detected CM  Not Detected  Not Detected Not Detected   Comment: INTERPRETIVE INFORMATION:Clonazepam, U  Positive Cutoff: 20 ng/mL  Methodology: Mass Spectrometry   7-AMINOCLONAZEPAM Not Detected Not Detected CM  Not Detected  Not Detected Not Detected   Comment: INTERPRETIVE INFORMATION:7-Aminoclonazepam, U  Positive Cutoff: 40 ng/mL  Methodology: Mass Spectrometry   DIAZEPAM Not Detected Not Detected CM  Not Detected  Not Detected Not Detected   Comment: INTERPRETIVE INFORMATION:Diazepam, U  Positive Cutoff: 50 ng/mL  Methodology: Mass Spectrometry   NORDIAZEPAM Not Detected Not Detected CM  Not Detected  Not Detected Not Detected   Comment: INTERPRETIVE INFORMATION:Nordiazepam, U  Positive Cutoff: 50 ng/mL  Methodology: Mass Spectrometry   OXAZEPAM Not Detected Not Detected CM  Not Detected  Not Detected Not Detected   Comment: INTERPRETIVE INFORMATION:Oxazepam, U  Positive Cutoff: 50 ng/mL  Methodology: Mass Spectrometry   TEMAZEPAM Not Detected Not Detected CM  Not Detected  Not Detected Not Detected   Comment: INTERPRETIVE INFORMATION:Temazepam, U  Positive Cutoff: 50 ng/mL  Methodology: Mass Spectrometry   Lorazepam Not Detected Not Detected CM  Not Detected  Not Detected Not Detected   Comment: INTERPRETIVE INFORMATION:Lorazepam, U  Positive Cutoff: 60 ng/mL  Methodology: Mass Spectrometry   MIDAZOLAM Not Detected Not Detected CM  Not Detected  Not Detected Not Detected   Comment: INTERPRETIVE INFORMATION:Midazolam, U  Positive Cutoff: 20 ng/mL  Methodology: Mass Spectrometry   ZOLPIDEM Not Detected Not Detected CM  Not Detected  Not Detected Not Detected   Comment: INTERPRETIVE  INFORMATION:Zolpidem, U  Positive Cutoff: 20 ng/mL  Methodology: Mass Spectrometry   BARBITURATES Negative Negative CM  Not Detected  Not Detected Not Detected   Comment: Presumptive negative by immunoassay. Testing by mass  spectrometry is available on request.  INTERPRETIVE INFORMATION:Barbiturates Screen, U  Positive Cutoff: 200 ng/mL  Methodology: Immunoassay   Creatinine, Urine 83.3 76.2 117.0 R 61.1 66.0 R 112.3 93.0   ETHYL GLUCURONIDE Negative Negative CM  Not Detected  Not Detected Not Detected   Comment: Presumptive negative by immunoassay. Testing by mass  spectrometry is available on request.  INTERPRETIVE INFORMATION:Ethyl Glucuronide Screen, U  Positive Cutoff: 500 ng/mL  Methodology: Immunoassay   MARIJUANA METABOLITE Negative Negative CM  Not Detected CM  Not Detected Not Detected   Comment: Presumptive negative by immunoassay. Testing by mass  spectrometry is available on request.  INTERPRETIVE INFORMATION: THC (Cannabinoids) Screen, U  Positive Cutoff: 50 ng/mL  Methodology: Immunoassay   PCP Negative Negative CM  Not Detected  Not Detected Not Detected   Comment: Presumptive negative by immunoassay. Testing by mass  spectrometry is available on request.  INTERPRETIVE INFORMATION:Phencyclidine Screen, U  Positive Cutoff: 25 ng/mL  Methodology: Immunoassay   CARISOPRODOL Negative Negative CM  Not Detected CM  Not Detected CM Not Detected CM   Comment: Presumptive negative by immunoassay. Testing by mass  spectrometry is available on request.  INTERPRETIVE INFORMATION: Carisoprodol Screen, U  Positive Cutoff: 100 ng/mL  Methodology: Immunoassay  The carisoprodol immunoassay has cross-reactivity to  carisoprodol and meprobamate.   Naloxone Not Detected Not Detected CM  Not Detected  Not Detected Not Detected   Comment: INTERPRETIVE INFORMATION:Naloxone, U  Positive Cutoff: 100 ng/mL  Methodology: Mass Spectrometry   Gabapentin Not Detected Not Detected CM  Not Detected  Not Detected Not Detected    Comment: INTERPRETIVE INFORMATION:Gabapentin, U  Positive Cutoff: 3,000 ng/mL  Methodology: Mass Spectrometry   Pregabalin Not Detected Not Detected CM  Not Detected  Not Detected Not Detected   Comment: INTERPRETIVE INFORMATION:Pregabalin, U  Positive Cutoff: 3,000 ng/mL  Methodology: Mass Spectrometry   Alpha-OH-Midazolam Not Detected Not Detected CM  Not Detected  Not Detected Not Detected   Comment: INTERPRETIVE INFORMATION:Alpha-OH-Midazolam, U  Positive Cutoff: 20 ng/mL  Methodology: Mass Spectrometry   Zolpidem Metabolite Not Detected Not Detected CM  Not Detected  Not Detected Not Detected   Comment: INTERPRETIVE INFORMATION:Zolpidem Metabolite, U  Positive Cutoff: 100 ng/mL

## 2025-02-17 NOTE — PROGRESS NOTES
BARIATRIC POST-OPERATIVE FOLLOW UP:    HPI:  59 y.o.-year-old female presents for 5 year post op.    Denies: nausea, vomiting, abdominal pain, changes in bowel movement pattern, fever, chills, dysphagia, chest pain, and shortness of breath.    ROS:    Review of Systems   Constitutional:  Negative for activity change and fatigue.   Respiratory:  Negative for cough and shortness of breath.    Cardiovascular:  Negative for chest pain, palpitations and leg swelling.   Gastrointestinal:  Negative for abdominal pain, nausea and vomiting.   Endocrine: Negative for polydipsia, polyphagia and polyuria.   Genitourinary:  Negative for dysuria.   Musculoskeletal:  Negative for gait problem.   Skin:  Negative for rash.   Allergic/Immunologic: Negative for immunocompromised state.   Neurological:  Negative for dizziness, syncope and weakness.   Hematological:  Does not bruise/bleed easily.   Psychiatric/Behavioral:  Negative for behavioral problems.        EXERCISE:  Walking more     VITAMINS:  Tolerating well     MEDICATIONS/ALLERGIES:  Have been reviewed.    DIET:  Bariatric Regular Diet     ASSESSMENT:  - Morbid obesity s/p sleeve gastrectomy on 8/2019.  - Co-morbidities: dyslipidemia, GERD, and obstructive sleep apnea  - Weight loss 90 lbs  44% EWL  - Exercise regimen walking and swimming   - Diet good     PLAN:  - Emphasized the importance of regular exercise and adherence to bariatric diet to achieve maximum weight loss.  - Encouraged patient to begin OR continue regular exercise.  - Follow-up with dietician to reinforce diet.  - Continue daily vitamins and medications.  - RTC in 12 months or sooner if needed.  - Call the office for any issues.  - Labs this week

## 2025-02-18 ENCOUNTER — TELEPHONE (OUTPATIENT)
Dept: PAIN MEDICINE | Facility: CLINIC | Age: 60
End: 2025-02-18
Payer: MEDICARE

## 2025-02-18 DIAGNOSIS — M10.9 GOUT, UNSPECIFIED CAUSE, UNSPECIFIED CHRONICITY, UNSPECIFIED SITE: ICD-10-CM

## 2025-02-18 RX ORDER — ALLOPURINOL 300 MG/1
300 TABLET ORAL 2 TIMES DAILY
Qty: 180 TABLET | Refills: 1 | Status: SHIPPED | OUTPATIENT
Start: 2025-02-18

## 2025-02-18 RX ORDER — ATORVASTATIN CALCIUM 80 MG/1
80 TABLET, FILM COATED ORAL DAILY
Qty: 90 TABLET | Refills: 1 | Status: SHIPPED | OUTPATIENT
Start: 2025-02-18

## 2025-02-18 NOTE — TELEPHONE ENCOUNTER
----- Message from Angela Forrester sent at 2/18/2025  2:19 PM CST -----  Regarding: Prior Authorization  Name of Who is Calling: Alivia Marie Madison Left The Message:  Alivia Marie DeepaktazRika is the request in detail:         Patient called requesting to status of her Prior Authorization for the medication oxyCODONE-acetaminophen (PERCOCET)  mg per tablet.  Please further advise.     Thank youReply by MY [facility]: NOPreferred Call Back :  [phone]

## 2025-02-18 NOTE — TELEPHONE ENCOUNTER
Staff spoke with patient and pharmacies and informed them of the information This medication or product is on your plan's list of covered drugs. Prior authorization is not required at this time. If your pharmacy has questions regarding the processing of your prescription, please have them call the [employer] pharmacy help desk at [phone]. *Please note: This request was submitted*  patient and pharmacy is in agreement to the above and verbalized understanding.

## 2025-02-18 NOTE — TELEPHONE ENCOUNTER
Care Due:                  Date            Visit Type   Department     Provider  --------------------------------------------------------------------------------                                [facility] INTERNAL  Last Visit: 08-      FOLLOW UP    MEDICINE       Clarisse MARTINEZ                              FOLLOWUP/OF  [facility] INTERNAL  Next Visit: 02-      FICE VISIT   MEDICINE       Clarisse Richardson                                                            Last  Test          Frequency    Reason                     Performed    Due Date  --------------------------------------------------------------------------------    HBA1C.......  6 months...  semaglutide..............  07- 12-    [facility] Embedded Care Due Messages. Reference number: [id number].   2/18/2025 1:07:48 PM CST

## 2025-02-18 NOTE — TELEPHONE ENCOUNTER
Staff called pt about message that was left with the call center. Pt did not answer. Staff left a vm.     Repair Anesthesia Method: local infiltration

## 2025-02-18 NOTE — TELEPHONE ENCOUNTER
----- Message from [employer]ong[employer]hat sent at 2/18/2025  4:03 PM CST -----   Name of Who is Calling: What is the request in detail:   patient request call back in reference to medication /Please contact to further discuss and advise  Can the clinic reply by [facility]: What Number to Call Back [facility]:   [phone]

## 2025-02-18 NOTE — TELEPHONE ENCOUNTER
----- Message from Summer sent at 2/18/2025  3:08 PM CST -----  Regarding: PA on oxyCODONE-acetaminophen (PERCOCET)  mg per tablet  Type:  Patient Returning Call  Name of who is calling:pt  What is request in detail:pt is requesting a call back in regards to medication, pt states she's been waiting on PA for medication for over a month now. Pt wants to know what the hold up, please assist.   Can clinic reply by [facility]:no  What number to call back [facility] not [facility]: [phone]

## 2025-02-19 ENCOUNTER — TELEPHONE (OUTPATIENT)
Dept: PAIN MEDICINE | Facility: CLINIC | Age: 60
End: 2025-02-19
Payer: MEDICARE

## 2025-02-19 NOTE — TELEPHONE ENCOUNTER
----- Message from Angela Forrester sent at 2/19/2025 12:39 PM CST -----  Regarding: Patient Advice  Name of Who is Calling:  Alivia Quiroga Left The Message:  Alivia Francois is the request in detail:             Patient called requesting a call back with the status of refill request for the medication oxyCODONE-acetaminophen (PERCOCET)  mg per tablet.Patient states a Prior Authorization is needed as per her insurance company.. Please give a call back at your earliest convenience and further advise.     Thank yReply by [facility]: NOPreferred Call Back : [phone] (M)

## 2025-02-19 NOTE — TELEPHONE ENCOUNTER
Provider Staff:  Action required for this patient    Requires labs      Please see care gap opportunities below in Care Due Message.    Thanks!  [facility]     Appointments      Date Provider   Last Visit   8/19/2024 Clarisse Richardson MD   Next Visit   2/24/2025 Clarisse Richardson MD     Refill Decision Note   Alivia Thomas  is requesting a refill authorization.  Brief Assessment and Rationale for Refill:  Approve     Medication Therapy Plan:         Comments:     Note composed:8:11 PM 02/18/2025

## 2025-02-19 NOTE — TELEPHONE ENCOUNTER
Staff tried reaching patient to inform her we're going to just do another PA for her medication. Staff left a detailed message.

## 2025-02-19 NOTE — TELEPHONE ENCOUNTER
----- Message from Jorge Luis sent at 2/19/2025  1:00 PM CST -----  Regarding: Return Call  Who Called:  PatientKonstantin Left Message for Patient:  Aye, Does the patient know what this is regarding?  Returning CallBest Call Back Number:  [phone] Additional Information: Patient is returning a call.  Please assist.

## 2025-02-19 NOTE — TELEPHONE ENCOUNTER
----- Message from Jorge Luis sent at 2/19/2025  1:07 PM CST -----  Regarding: Prior Authorization  Name of Who is Calling:  Sujata calling from What is the request in detail:  Please call Mini about a prior authorization for patient for oxyCODONE-acetaminophen (PERCOCET)  mg per tabletCan the clinic reply by [facility]: NoWhat Number to Call Back if not in [facility]: [phone]

## 2025-02-22 DIAGNOSIS — Z00.00 ENCOUNTER FOR MEDICARE ANNUAL WELLNESS EXAM: ICD-10-CM

## 2025-02-24 ENCOUNTER — LAB VISIT (OUTPATIENT)
Dept: LAB | Facility: HOSPITAL | Age: 60
End: 2025-02-24
Payer: MEDICARE

## 2025-02-24 ENCOUNTER — OFFICE VISIT (OUTPATIENT)
Dept: INTERNAL MEDICINE | Facility: CLINIC | Age: 60
End: 2025-02-24
Payer: MEDICARE

## 2025-02-24 VITALS
HEIGHT: 65 IN | TEMPERATURE: 98 F | SYSTOLIC BLOOD PRESSURE: 134 MMHG | WEIGHT: 255.31 LBS | HEART RATE: 61 BPM | BODY MASS INDEX: 42.54 KG/M2 | OXYGEN SATURATION: 98 % | DIASTOLIC BLOOD PRESSURE: 80 MMHG

## 2025-02-24 DIAGNOSIS — J40 BRONCHITIS: ICD-10-CM

## 2025-02-24 DIAGNOSIS — E11.42 DIABETIC POLYNEUROPATHY ASSOCIATED WITH TYPE 2 DIABETES MELLITUS: Primary | ICD-10-CM

## 2025-02-24 DIAGNOSIS — N39.46 MIXED INCONTINENCE URGE AND STRESS (MALE)(FEMALE): ICD-10-CM

## 2025-02-24 DIAGNOSIS — I48.0 PAROXYSMAL ATRIAL FIBRILLATION: ICD-10-CM

## 2025-02-24 DIAGNOSIS — E11.9 TYPE 2 DIABETES MELLITUS WITHOUT COMPLICATION, WITHOUT LONG-TERM CURRENT USE OF INSULIN: ICD-10-CM

## 2025-02-24 DIAGNOSIS — K21.9 GASTROESOPHAGEAL REFLUX DISEASE, UNSPECIFIED WHETHER ESOPHAGITIS PRESENT: ICD-10-CM

## 2025-02-24 DIAGNOSIS — E66.01 MORBID (SEVERE) OBESITY DUE TO EXCESS CALORIES: ICD-10-CM

## 2025-02-24 DIAGNOSIS — Z98.84 OBESITY SURGERY STATUS: ICD-10-CM

## 2025-02-24 DIAGNOSIS — Z12.11 SCREENING FOR COLON CANCER: ICD-10-CM

## 2025-02-24 DIAGNOSIS — I70.0 AORTIC ATHEROSCLEROSIS: ICD-10-CM

## 2025-02-24 DIAGNOSIS — E11.42 DIABETIC POLYNEUROPATHY ASSOCIATED WITH TYPE 2 DIABETES MELLITUS: ICD-10-CM

## 2025-02-24 DIAGNOSIS — Z01.419 NORMAL GYNECOLOGIC EXAMINATION: ICD-10-CM

## 2025-02-24 DIAGNOSIS — Z90.3 S/P GASTRIC SLEEVE PROCEDURE: ICD-10-CM

## 2025-02-24 DIAGNOSIS — Z86.39 HISTORY OF MORBID OBESITY: ICD-10-CM

## 2025-02-24 DIAGNOSIS — I10 ESSENTIAL HYPERTENSION: ICD-10-CM

## 2025-02-24 DIAGNOSIS — G47.33 OSA (OBSTRUCTIVE SLEEP APNEA): ICD-10-CM

## 2025-02-24 DIAGNOSIS — F33.41 RECURRENT MAJOR DEPRESSIVE DISORDER, IN PARTIAL REMISSION: ICD-10-CM

## 2025-02-24 DIAGNOSIS — J32.9 SINUSITIS, UNSPECIFIED CHRONICITY, UNSPECIFIED LOCATION: ICD-10-CM

## 2025-02-24 PROCEDURE — 84443 ASSAY THYROID STIM HORMONE: CPT | Performed by: INTERNAL MEDICINE

## 2025-02-24 PROCEDURE — 84466 ASSAY OF TRANSFERRIN: CPT | Performed by: NURSE PRACTITIONER

## 2025-02-24 PROCEDURE — 82607 VITAMIN B-12: CPT | Performed by: NURSE PRACTITIONER

## 2025-02-24 PROCEDURE — 85025 COMPLETE CBC W/AUTO DIFF WBC: CPT | Performed by: NURSE PRACTITIONER

## 2025-02-24 PROCEDURE — 80061 LIPID PANEL: CPT | Performed by: NURSE PRACTITIONER

## 2025-02-24 PROCEDURE — 84425 ASSAY OF VITAMIN B-1: CPT | Performed by: NURSE PRACTITIONER

## 2025-02-24 PROCEDURE — 83036 HEMOGLOBIN GLYCOSYLATED A1C: CPT | Performed by: INTERNAL MEDICINE

## 2025-02-24 PROCEDURE — 99215 OFFICE O/P EST HI 40 MIN: CPT | Mod: PBBFAC,PO | Performed by: INTERNAL MEDICINE

## 2025-02-24 PROCEDURE — 80053 COMPREHEN METABOLIC PANEL: CPT | Performed by: NURSE PRACTITIONER

## 2025-02-24 PROCEDURE — 36415 COLL VENOUS BLD VENIPUNCTURE: CPT | Mod: PO | Performed by: NURSE PRACTITIONER

## 2025-02-24 PROCEDURE — 99999 PR PBB SHADOW E&M-EST. PATIENT-LVL V: CPT | Mod: PBBFAC,,, | Performed by: INTERNAL MEDICINE

## 2025-02-24 PROCEDURE — 82306 VITAMIN D 25 HYDROXY: CPT | Performed by: NURSE PRACTITIONER

## 2025-02-24 RX ORDER — AZITHROMYCIN 250 MG/1
TABLET, FILM COATED ORAL
Qty: 6 TABLET | Refills: 0 | Status: SHIPPED | OUTPATIENT
Start: 2025-02-24

## 2025-02-24 RX ORDER — OXYBUTYNIN CHLORIDE 5 MG/1
5 TABLET, EXTENDED RELEASE ORAL DAILY
Qty: 90 TABLET | Refills: 3 | Status: SHIPPED | OUTPATIENT
Start: 2025-02-24

## 2025-02-24 RX ORDER — OMEPRAZOLE 40 MG/1
40 CAPSULE, DELAYED RELEASE ORAL DAILY
Qty: 90 CAPSULE | Refills: 3 | Status: SHIPPED | OUTPATIENT
Start: 2025-02-24 | End: 2026-02-24

## 2025-02-24 RX ORDER — FLUOXETINE HYDROCHLORIDE 40 MG/1
40 CAPSULE ORAL DAILY
Qty: 90 CAPSULE | Refills: 3 | Status: SHIPPED | OUTPATIENT
Start: 2025-02-24 | End: 2025-03-26

## 2025-02-24 RX ORDER — PROMETHAZINE HYDROCHLORIDE AND DEXTROMETHORPHAN HYDROBROMIDE 6.25; 15 MG/5ML; MG/5ML
5 SYRUP ORAL NIGHTLY PRN
Qty: 118 ML | Refills: 0 | Status: SHIPPED | OUTPATIENT
Start: 2025-02-24 | End: 2025-03-19

## 2025-02-24 RX ORDER — SEMAGLUTIDE 2.68 MG/ML
2 INJECTION, SOLUTION SUBCUTANEOUS
Qty: 3 ML | Refills: 11 | Status: SHIPPED | OUTPATIENT
Start: 2025-02-24 | End: 2026-02-24

## 2025-02-25 LAB
25(OH)D3+25(OH)D2 SERPL-MCNC: 36 NG/ML (ref 30–96)
ALBUMIN SERPL BCP-MCNC: 3.9 G/DL (ref 3.5–5.2)
ALP SERPL-CCNC: 84 U/L (ref 40–150)
ALT SERPL W/O P-5'-P-CCNC: 18 U/L (ref 10–44)
ANION GAP SERPL CALC-SCNC: 8 MMOL/L (ref 8–16)
AST SERPL-CCNC: 45 U/L (ref 10–40)
BASOPHILS # BLD AUTO: 0.03 K/UL (ref 0–0.2)
BASOPHILS NFR BLD: 0.6 % (ref 0–1.9)
BILIRUB SERPL-MCNC: 0.6 MG/DL (ref 0.1–1)
BUN SERPL-MCNC: 9 MG/DL (ref 6–20)
CALCIUM SERPL-MCNC: 9.7 MG/DL (ref 8.7–10.5)
CHLORIDE SERPL-SCNC: 108 MMOL/L (ref 95–110)
CHOLEST SERPL-MCNC: 149 MG/DL (ref 120–199)
CHOLEST/HDLC SERPL: 2.2 {RATIO} (ref 2–5)
CO2 SERPL-SCNC: 24 MMOL/L (ref 23–29)
CREAT SERPL-MCNC: 0.7 MG/DL (ref 0.5–1.4)
DIFFERENTIAL METHOD BLD: ABNORMAL
EOSINOPHIL # BLD AUTO: 0.1 K/UL (ref 0–0.5)
EOSINOPHIL NFR BLD: 2.6 % (ref 0–8)
ERYTHROCYTE [DISTWIDTH] IN BLOOD BY AUTOMATED COUNT: 14.6 % (ref 11.5–14.5)
EST. GFR  (NO RACE VARIABLE): >60 ML/MIN/1.73 M^2
ESTIMATED AVG GLUCOSE: 140 MG/DL (ref 68–131)
GLUCOSE SERPL-MCNC: 89 MG/DL (ref 70–110)
HBA1C MFR BLD: 6.5 % (ref 4–5.6)
HCT VFR BLD AUTO: 38 % (ref 37–48.5)
HDLC SERPL-MCNC: 68 MG/DL (ref 40–75)
HDLC SERPL: 45.6 % (ref 20–50)
HGB BLD-MCNC: 12.2 G/DL (ref 12–16)
IMM GRANULOCYTES # BLD AUTO: 0 K/UL (ref 0–0.04)
IMM GRANULOCYTES NFR BLD AUTO: 0 % (ref 0–0.5)
IRON SERPL-MCNC: 85 UG/DL (ref 30–160)
LDLC SERPL CALC-MCNC: 72.8 MG/DL (ref 63–159)
LYMPHOCYTES # BLD AUTO: 1.6 K/UL (ref 1–4.8)
LYMPHOCYTES NFR BLD: 32.9 % (ref 18–48)
MCH RBC QN AUTO: 31 PG (ref 27–31)
MCHC RBC AUTO-ENTMCNC: 32.1 G/DL (ref 32–36)
MCV RBC AUTO: 97 FL (ref 82–98)
MONOCYTES # BLD AUTO: 0.5 K/UL (ref 0.3–1)
MONOCYTES NFR BLD: 10.5 % (ref 4–15)
NEUTROPHILS # BLD AUTO: 2.6 K/UL (ref 1.8–7.7)
NEUTROPHILS NFR BLD: 53.4 % (ref 38–73)
NONHDLC SERPL-MCNC: 81 MG/DL
NRBC BLD-RTO: 0 /100 WBC
PLATELET # BLD AUTO: 262 K/UL (ref 150–450)
PMV BLD AUTO: 11.2 FL (ref 9.2–12.9)
POTASSIUM SERPL-SCNC: 5 MMOL/L (ref 3.5–5.1)
PROT SERPL-MCNC: 8 G/DL (ref 6–8.4)
RBC # BLD AUTO: 3.93 M/UL (ref 4–5.4)
SATURATED IRON: 20 % (ref 20–50)
SODIUM SERPL-SCNC: 140 MMOL/L (ref 136–145)
TOTAL IRON BINDING CAPACITY: 426 UG/DL (ref 250–450)
TRANSFERRIN SERPL-MCNC: 288 MG/DL (ref 200–375)
TRIGL SERPL-MCNC: 41 MG/DL (ref 30–150)
TSH SERPL DL<=0.005 MIU/L-ACNC: 0.96 UIU/ML (ref 0.4–4)
VIT B12 SERPL-MCNC: 612 PG/ML (ref 210–950)
WBC # BLD AUTO: 4.93 K/UL (ref 3.9–12.7)

## 2025-03-03 ENCOUNTER — CLINICAL SUPPORT (OUTPATIENT)
Dept: ENDOSCOPY | Facility: HOSPITAL | Age: 60
End: 2025-03-03
Attending: INTERNAL MEDICINE
Payer: MEDICARE

## 2025-03-03 ENCOUNTER — TELEPHONE (OUTPATIENT)
Dept: ENDOSCOPY | Facility: HOSPITAL | Age: 60
End: 2025-03-03
Payer: MEDICARE

## 2025-03-03 DIAGNOSIS — Z12.11 SCREENING FOR COLON CANCER: ICD-10-CM

## 2025-03-03 DIAGNOSIS — K21.9 GASTROESOPHAGEAL REFLUX DISEASE, UNSPECIFIED WHETHER ESOPHAGITIS PRESENT: Primary | ICD-10-CM

## 2025-03-03 DIAGNOSIS — Z12.11 SPECIAL SCREENING FOR MALIGNANT NEOPLASMS, COLON: ICD-10-CM

## 2025-03-03 RX ORDER — POLYETHYLENE GLYCOL 3350, SODIUM SULFATE ANHYDROUS, SODIUM BICARBONATE, SODIUM CHLORIDE, POTASSIUM CHLORIDE 236; 22.74; 6.74; 5.86; 2.97 G/4L; G/4L; G/4L; G/4L; G/4L
4 POWDER, FOR SOLUTION ORAL ONCE
Qty: 4000 ML | Refills: 0 | Status: SHIPPED | OUTPATIENT
Start: 2025-03-03 | End: 2025-03-04

## 2025-03-03 NOTE — TELEPHONE ENCOUNTER
Referral for procedure from PAT appointment      Spoke to patient to schedule procedure(s) Colonoscopy/EGD       Physician to perform procedure(s) Dr. YNES Cooney  Date of Procedure (s) 4/14/25  Arrival Time 12:00 PM  Time of Procedure(s) 1:00 PM   Location of Procedure(s) [facility]  Type of Rx Prep sent to patient: PEG  Instructions provided to [facility] via [facility]    Patient was informed on the following information and verbalized understanding. Screening questionnaire reviewed with patient and complete. If procedure requires anesthesia, a responsible adult needs to be present to accompany the patient home, patient cannot drive after receiving anesthesia. Appointment details are tentative, especially check-in time. Patient will receive a prep-op call 7 days prior to confirm check-in time for procedure. If applicable the patient should contact their pharmacy to verify Rx for procedure prep is ready for pick-up. Patient was advised to call the scheduling department at [phone] if pharmacy states no Rx is available. Patient was advised to call the endoscopy scheduling department if any questions or concerns arise.       Endoscopy Scheduling Department      The patient is currently under an internal PCP, vandana Dai. Is patient okay to hold blood thinner Eliquis (apixaban) for their upcoming scheduled Colonoscopy/EGD on 4/14/25.

## 2025-03-04 ENCOUNTER — RESULTS FOLLOW-UP (OUTPATIENT)
Dept: INTERNAL MEDICINE | Facility: CLINIC | Age: 60
End: 2025-03-04

## 2025-03-04 PROBLEM — M46.1 SACROILIITIS: Status: RESOLVED | Noted: 2021-05-11 | Resolved: 2025-03-04

## 2025-03-04 PROBLEM — F33.41 RECURRENT MAJOR DEPRESSIVE DISORDER, IN PARTIAL REMISSION: Status: ACTIVE | Noted: 2025-03-04

## 2025-03-05 ENCOUNTER — TELEPHONE (OUTPATIENT)
Dept: ENDOSCOPY | Facility: HOSPITAL | Age: 60
End: 2025-03-05
Payer: MEDICARE

## 2025-03-05 NOTE — TELEPHONE ENCOUNTER
----- Message from DARIUSZ Hollingsworth sent at 3/3/2025 10:40 AM CST -----  Regardin/14 BT  The patient is currently under an internal PCP, vandana Dai. Is patient okay to hold blood thinner Eliquis (apixaban) for their upcoming scheduled Colonoscopy/EGD on 25.  
Dear Dr Morton,    Patient has a scheduled procedure Colonoscopy/EGD on 4/14/25 and is currently taking a blood thinner. In order to ensure patient safety, we would like to confirm that the patient can place their blood thinner medication on hold for the procedure. Can he/she discontinue Eliquis (apixaban) for a minimum of 2 days prior to the procedure?     Thank you for your prompt reply.    [facility] Endoscopy Scheduling   
Statement Selected

## 2025-03-05 NOTE — PROGRESS NOTES
Subjective:       Patient ID: Alivia Thomas is a 59 y.o. female.    Chief Complaint: Annual Exam    HPI Over the last 10 days she has developed sinus congestion and cough.  No fever or chills.  No CP or SOB.  Review of Systems   Constitutional:  Negative for activity change and unexpected weight change.   HENT:  Negative for hearing loss, rhinorrhea and trouble swallowing.    Eyes:  Negative for discharge and visual disturbance.   Respiratory:  Negative for chest tightness, shortness of breath (PND or orthopnea) and wheezing.    Cardiovascular:  Negative for chest pain and palpitations.   Gastrointestinal:  Negative for abdominal pain, blood in stool, constipation, diarrhea, nausea and vomiting.   Endocrine: Negative for polydipsia and polyuria.   Genitourinary:  Negative for difficulty urinating, dysuria, hematuria and menstrual problem.   Musculoskeletal:  Positive for arthralgias and joint swelling. Negative for neck pain.   Neurological:  Negative for seizures, syncope, weakness and headaches.   Psychiatric/Behavioral:  Negative for confusion and dysphoric mood.        Objective:      Physical Exam  Constitutional:       General: She is not in acute distress.     Appearance: She is well-developed.   HENT:      Head: Normocephalic.   Eyes:      Pupils: Pupils are equal, round, and reactive to light.   Neck:      Thyroid: No thyromegaly.      Vascular: No JVD.   Cardiovascular:      Rate and Rhythm: Normal rate and regular rhythm.      Heart sounds: Normal heart sounds. No murmur heard.     No friction rub. No gallop.   Pulmonary:      Effort: Pulmonary effort is normal.      Breath sounds: Normal breath sounds. No wheezing or rales.   Abdominal:      General: Bowel sounds are normal. There is no distension.      Palpations: Abdomen is soft. There is no mass.      Tenderness: There is no abdominal tenderness. There is no guarding or rebound.   Musculoskeletal:      Cervical back: Neck supple.    Lymphadenopathy:      Cervical: No cervical adenopathy.   Skin:     General: Skin is warm and dry.   Neurological:      Mental Status: She is alert and oriented to person, place, and time.      Deep Tendon Reflexes: Reflexes are normal and symmetric.   Psychiatric:         Behavior: Behavior normal.         Thought Content: Thought content normal.         Judgment: Judgment normal.         Assessment:       1. Diabetic polyneuropathy associated with type 2 diabetes mellitus    2. Mixed incontinence urge and stress (male)(female)    3. Normal gynecologic examination    4. Screening for colon cancer    5. Gastroesophageal reflux disease, unspecified whether esophagitis present    6. Paroxysmal atrial fibrillation    7. Recurrent major depressive disorder, in partial remission    8. Aortic atherosclerosis    9. Morbid (severe) obesity due to excess calories        Plan:   Diabetic polyneuropathy associated with type 2 diabetes mellitus  -     TSH; Future; Expected date: 02/24/2025  -     Hemoglobin A1C; Future; Expected date: 02/24/2025  -     Cancel: Hemoglobin A1C; Future; Expected date: 02/24/2025  -     Cancel: TSH; Future; Expected date: 02/24/2025    Mixed incontinence urge and stress (male)(female)  -     oxybutynin (DITROPAN-XL) 5 MG TR24; Take 1 tablet (5 mg total) by mouth once daily.  Dispense: 90 tablet; Refill: 3    Normal gynecologic examination  -     Ambulatory referral/consult to Gynecology; Future; Expected date: 03/03/2025    Screening for colon cancer  -     Ambulatory referral/consult to Endo Procedure ; Future; Expected date: 02/25/2025    Gastroesophageal reflux disease, unspecified whether esophagitis present  -     Ambulatory referral/consult to Endo Procedure ; Future; Expected date: 02/25/2025    Paroxysmal atrial fibrillation  -     Ambulatory referral/consult to Electrophysiology; Future; Expected date: 03/03/2025    Recurrent major depressive disorder, in partial  remission  Stable and monitored  Aortic atherosclerosis  On statin  Morbid (severe) obesity due to excess calories  Increase ozmepic  Other orders  -     omeprazole (PRILOSEC) 40 MG capsule; Take 1 capsule (40 mg total) by mouth once daily.  Dispense: 90 capsule; Refill: 3  -     apixaban (ELIQUIS) 5 mg Tab; Take 1 tablet (5 mg total) by mouth 2 (two) times daily.  Dispense: 60 tablet; Refill: 6  -     FLUoxetine 40 MG capsule; Take 1 capsule (40 mg total) by mouth once daily.  Dispense: 90 capsule; Refill: 3  -     semaglutide (OZEMPIC) 2 mg/dose (8 mg/3 mL) PnIj; Inject 2 mg into the skin every 7 days.  Dispense: 3 mL; Refill: 11  Sinusitis/bronchitis  -     azithromycin (Z-MICKI) 250 MG tablet; follow package directions  Dispense: 6 tablet; Refill: 0  -     promethazine-dextromethorphan (PROMETHAZINE-DM) 6.25-15 mg/5 mL Syrp; Take 5 mLs by mouth nightly as needed (cough).  Dispense: 118 mL; Refill: 0

## 2025-03-08 ENCOUNTER — PATIENT MESSAGE (OUTPATIENT)
Dept: INTERNAL MEDICINE | Facility: CLINIC | Age: 60
End: 2025-03-08
Payer: MEDICARE

## 2025-03-10 ENCOUNTER — PATIENT MESSAGE (OUTPATIENT)
Dept: BARIATRICS | Facility: CLINIC | Age: 60
End: 2025-03-10
Payer: MEDICARE

## 2025-03-11 ENCOUNTER — TELEPHONE (OUTPATIENT)
Dept: ELECTROPHYSIOLOGY | Facility: CLINIC | Age: 60
End: 2025-03-11
Payer: MEDICARE

## 2025-03-11 DIAGNOSIS — I48.0 PAROXYSMAL ATRIAL FIBRILLATION: Primary | ICD-10-CM

## 2025-03-13 DIAGNOSIS — M51.369 DDD (DEGENERATIVE DISC DISEASE), LUMBAR: ICD-10-CM

## 2025-03-13 DIAGNOSIS — M25.561 CHRONIC PAIN OF BOTH KNEES: ICD-10-CM

## 2025-03-13 DIAGNOSIS — M25.562 CHRONIC PAIN OF BOTH KNEES: ICD-10-CM

## 2025-03-13 DIAGNOSIS — M17.0 PRIMARY OSTEOARTHRITIS OF BOTH KNEES: ICD-10-CM

## 2025-03-13 DIAGNOSIS — G89.4 CHRONIC PAIN SYNDROME: ICD-10-CM

## 2025-03-13 DIAGNOSIS — G89.29 CHRONIC PAIN OF BOTH KNEES: ICD-10-CM

## 2025-03-13 DIAGNOSIS — M47.816 SPONDYLOSIS OF LUMBAR REGION WITHOUT MYELOPATHY OR RADICULOPATHY: ICD-10-CM

## 2025-03-14 RX ORDER — OXYCODONE AND ACETAMINOPHEN 10; 325 MG/1; MG/1
1 TABLET ORAL
Qty: 105 TABLET | Refills: 0 | Status: SHIPPED | OUTPATIENT
Start: 2025-03-14

## 2025-03-14 NOTE — TELEPHONE ENCOUNTER
Patient requesting refill on percocet  Last office visit 1/27/25   shows last refill on 2/20/25  Patient  have a pain contract on file with [facility]  Patient last UDS 12/18/25

## 2025-03-24 ENCOUNTER — OFFICE VISIT (OUTPATIENT)
Dept: OBSTETRICS AND GYNECOLOGY | Facility: CLINIC | Age: 60
End: 2025-03-24
Payer: MEDICARE

## 2025-03-24 ENCOUNTER — OFFICE VISIT (OUTPATIENT)
Dept: PODIATRY | Facility: CLINIC | Age: 60
End: 2025-03-24
Payer: MEDICARE

## 2025-03-24 VITALS
HEART RATE: 64 BPM | HEIGHT: 65 IN | DIASTOLIC BLOOD PRESSURE: 82 MMHG | BODY MASS INDEX: 42.54 KG/M2 | WEIGHT: 255.31 LBS | SYSTOLIC BLOOD PRESSURE: 141 MMHG

## 2025-03-24 VITALS — BODY MASS INDEX: 40.61 KG/M2 | WEIGHT: 244.06 LBS

## 2025-03-24 DIAGNOSIS — Z01.419 ENCOUNTER FOR GYNECOLOGICAL EXAMINATION WITHOUT ABNORMAL FINDING: Primary | ICD-10-CM

## 2025-03-24 DIAGNOSIS — E11.49 TYPE II DIABETES MELLITUS WITH NEUROLOGICAL MANIFESTATIONS: Primary | ICD-10-CM

## 2025-03-24 DIAGNOSIS — B37.31 YEAST VAGINITIS: ICD-10-CM

## 2025-03-24 DIAGNOSIS — B35.1 DERMATOPHYTOSIS OF NAIL: ICD-10-CM

## 2025-03-24 DIAGNOSIS — Z79.01 CHRONIC ANTICOAGULATION: ICD-10-CM

## 2025-03-24 DIAGNOSIS — Z01.419 NORMAL GYNECOLOGIC EXAMINATION: ICD-10-CM

## 2025-03-24 DIAGNOSIS — L84 CORN OR CALLUS: ICD-10-CM

## 2025-03-24 DIAGNOSIS — Z11.3 SCREEN FOR STD (SEXUALLY TRANSMITTED DISEASE): ICD-10-CM

## 2025-03-24 PROCEDURE — 88175 CYTOPATH C/V AUTO FLUID REDO: CPT | Mod: TC | Performed by: OBSTETRICS & GYNECOLOGY

## 2025-03-24 PROCEDURE — 99999 PR PBB SHADOW E&M-EST. PATIENT-LVL IV: CPT | Mod: PBBFAC,,, | Performed by: OBSTETRICS & GYNECOLOGY

## 2025-03-24 PROCEDURE — 99999 PR PBB SHADOW E&M-EST. PATIENT-LVL IV: CPT | Mod: PBBFAC,,, | Performed by: PODIATRIST

## 2025-03-24 PROCEDURE — 81515 NFCT DS BV&VAGINITIS DNA ALG: CPT | Performed by: OBSTETRICS & GYNECOLOGY

## 2025-03-24 PROCEDURE — 87591 N.GONORRHOEAE DNA AMP PROB: CPT | Performed by: OBSTETRICS & GYNECOLOGY

## 2025-03-24 PROCEDURE — 11721 DEBRIDE NAIL 6 OR MORE: CPT | Mod: Q9,PBBFAC,PN | Performed by: PODIATRIST

## 2025-03-24 PROCEDURE — 99214 OFFICE O/P EST MOD 30 MIN: CPT | Mod: PBBFAC,25,27,PN | Performed by: OBSTETRICS & GYNECOLOGY

## 2025-03-24 PROCEDURE — 99214 OFFICE O/P EST MOD 30 MIN: CPT | Mod: PBBFAC,PN | Performed by: PODIATRIST

## 2025-03-24 RX ORDER — FLUCONAZOLE 150 MG/1
150 TABLET ORAL ONCE
Qty: 1 TABLET | Refills: 3 | Status: SHIPPED | OUTPATIENT
Start: 2025-03-24 | End: 2025-03-25

## 2025-03-24 NOTE — PROGRESS NOTES
Chief Complaint   Patient presents with    Diabetic Foot Exam     Foot Exam/PCP Clarisse Richardson MD  02/24/25           HPI:   The patient is a 59 y.o.  female  who presents for a diabetic foot exam/care   Patient reports + presence of abnormal sensation to the feet, chronic condition.   Patient has no history of wounds on the feet.   Hx of foot surgery: none.     She has history of gout, relieved with indomethacin.   This patient has documented high risk feet requiring routine maintenance secondary to diabetes.      She is on Eliquis (chronic anticoagulation) for atrial fibrillation.          Primary care doctor is: Clarisse Richardson MD  Last PCP Visit: 2/24/2025        Patient Active Problem List   Diagnosis    Morbid (severe) obesity due to excess calories    PADDY (obstructive sleep apnea)    Type 2 diabetes mellitus, without long-term current use of insulin    Hyperlipemia    Proteinuria    Bilateral knee pain    DJD (degenerative joint disease) of knee    Debility    Prosthetic joint implant failure    Failed total right knee replacement    Right knee pain    History of total left knee replacement    Lumbago    Chronic, continuous use of opioids    Mild intermittent asthma without complication    Allergic reaction to food    DDD (degenerative disc disease), cervical    Cervical radiculopathy    Cervical spondylosis    Bilateral shoulder bursitis    Cervical stenosis of spinal canal    DDD (degenerative disc disease), thoracic    Thoracic spondylosis    Spondylolisthesis at L4-L5 level    Lumbar spondylosis    Spondylolisthesis of cervical region    Cervical spine instability    Myeloradiculopathy    Neural foraminal stenosis of cervical spine    DDD (degenerative disc disease), lumbar    Idiopathic scoliosis of thoracolumbar spine    Bilateral shoulder region arthritis    Trochanteric bursitis of both hips    Chronic pain    Anxiety    GERD (gastroesophageal reflux disease)    Sacroiliac joint pain     Spondylosis without myelopathy    Subacromial bursitis of left shoulder joint    PADDY non-compliant with CPAP    BMI 40.0-44.9, adult    Gout    History of sleeve gastrectomy    History of total right knee replacement    Noncompliance with CPAP treatment    Osteoarthritis of lumbar spine    Aortic atherosclerosis    Uterine fibroid    Impaired range of motion of both knees    Decreased strength of lower extremity    Impaired functional mobility, balance, gait, and endurance    Paroxysmal atrial fibrillation    History of right MCA stroke    Osteoarthritis of multiple joints    Diabetic polyneuropathy associated with type 2 diabetes mellitus    Allergies    History of COVID-19    History of MRSA infection    Postprocedural hypotension    Hypotension    Impaired ambulation    Lumbosacral radiculopathy    Recurrent major depressive disorder, in partial remission           Current Outpatient Medications on File Prior to Visit   Medication Sig Dispense Refill    acetaminophen (TYLENOL) 650 MG TbSR Take 1 tablet (650 mg total) by mouth every 8 (eight) hours as needed (pain). 90 tablet 1    albuterol (PROVENTIL/VENTOLIN HFA) 90 mcg/actuation inhaler INHALE TWO PUFFS INTO LUNGS EVERY 4 TO 6 HOURS AS NEEDED FOR SHORTNESS OF BREATH AND FOR WHEEZING 54 g 3    allopurinoL (ZYLOPRIM) 300 MG tablet TAKE 1 TABLET (300 MG TOTAL) BY MOUTH 2 (TWO) TIMES DAILY. 180 tablet 1    apixaban (ELIQUIS) 5 mg Tab Take 1 tablet (5 mg total) by mouth 2 (two) times daily. 60 tablet 6    atorvastatin (LIPITOR) 80 MG tablet Take 1 tablet (80 mg total) by mouth once daily. 90 tablet 1    azithromycin (Z-MICKI) 250 MG tablet follow package directions 6 tablet 0    b complex vitamins tablet Take 1 tablet by mouth every other day.       calcium citrate/vitamin D2 (ISIAH-CITRATE ORAL) Take 500 mg by mouth once daily.      colchicine (MITIGARE) 0.6 mg Cap One daily as needed for gout flare (Patient taking differently: One daily) 90 capsule 1    cyanocobalamin  (VITAMIN B-12) 1000 MCG tablet Take 100 mcg by mouth every morning.      FLUoxetine 40 MG capsule Take 1 capsule (40 mg total) by mouth once daily. 90 capsule 3    LIDOcaine (XYLOCAINE) 5 % Oint ointment APPLY TOPICALLY AS NEEDED. 35.44 g 6    LIDOcaine-prilocaine (EMLA) cream APPLY TOPICALLY AS NEEDED. FOR FOOT PAIN 30 g 2    MULTIVIT-IRON-MIN-FOLIC ACID 3,500-18-0.4 UNIT-MG-MG ORAL CHEW Take 1 tablet by mouth 2 (two) times daily.       nystatin (MYCOSTATIN) powder Apply topically 2 (two) times daily. 60 g 3    omeprazole (PRILOSEC) 40 MG capsule Take 1 capsule (40 mg total) by mouth once daily. 90 capsule 3    oxybutynin (DITROPAN-XL) 5 MG TR24 Take 1 tablet (5 mg total) by mouth once daily. 90 tablet 3    oxyCODONE-acetaminophen (PERCOCET)  mg per tablet Take 1 tablet by mouth every 6 to 8 hours as needed for Pain (for severe pain). 105 tablet 0    semaglutide (OZEMPIC) 2 mg/dose (8 mg/3 mL) PnIj Inject 2 mg into the skin every 7 days. 3 mL 11    urea (CARMOL) 40 % Crea Apply topically once daily. To dry skin on the feet. 120 g 5    vitamin D 185 MG Tab Take 5,000 mg by mouth every morning.       blood pressure monitor (BLOOD PRESSURE KIT) Kit 1 kit by Misc.(Non-Drug; Combo Route) route Daily. Check blood pressure daily 1 each 0    fluticasone propionate (FLONASE) 50 mcg/actuation nasal spray 1 SPRAY BY EACH NOSTRIL ROUTE 2 TIMES DAILY AS NEEDED FOR RHINITIS. 48 g 3    naloxone (NARCAN) 4 mg/actuation Spry 4mg by nasal route as needed for opioid overdose; may repeat every 2-3 minutes in alternating nostrils until medical help arrives. Call 911 1 each 11    prednisoLONE acetate (PRED FORTE) 1 % DrpS Place 1 drop into the right eye 3 (three) times daily.      senna-docusate 8.6-50 mg (SENNA WITH DOCUSATE SODIUM) 8.6-50 mg per tablet Take 1 tablet by mouth once daily. 30 tablet 0     Current Facility-Administered Medications on File Prior to Visit   Medication Dose Route Frequency Provider Last Rate Last Admin     0.9%  NaCl infusion   Intravenous Continuous Lina Wagner MD 25 mL/hr at 12/15/20 1506 25 mL/hr at 12/15/20 1506    0.9%  NaCl infusion   Intravenous Continuous Ciro Chung MD        0.9%  NaCl infusion   Intravenous Continuous Santos Barron MD           Review of patient's allergies indicates:  No Known Allergies      ROS:  General ROS: negative  Respiratory ROS: no cough, shortness of breath, or wheezing  Cardiovascular ROS: no chest pain or dyspnea on exertion  Musculoskeletal ROS: negative  Neurological ROS:   negative for - gait disturbance, + numbness/tingling, seizures or tremors  Dermatological ROS: + nail changes, dry skin      LAST HbA1c:   Hemoglobin A1C   Date Value Ref Range Status   02/24/2025 6.5 (H) 4.0 - 5.6 % Final     Comment:     ADA Screening Guidelines:  5.7-6.4%  Consistent with prediabetes  >or=6.5%  Consistent with diabetes    High levels of fetal hemoglobin interfere with the HbA1C  assay. Heterozygous hemoglobin variants (HbS, HgC, etc)do  not significantly interfere with this assay.   However, presence of multiple variants may affect accuracy.     07/01/2024 6.9 (H) 4.0 - 5.6 % Final     Comment:     ADA Screening Guidelines:  5.7-6.4%  Consistent with prediabetes  >or=6.5%  Consistent with diabetes    High levels of fetal hemoglobin interfere with the HbA1C  assay. Heterozygous hemoglobin variants (HbS, HgC, etc)do  not significantly interfere with this assay.   However, presence of multiple variants may affect accuracy.     01/05/2024 6.2 (H) 4.0 - 5.6 % Final     Comment:     ADA Screening Guidelines:  5.7-6.4%  Consistent with prediabetes  >or=6.5%  Consistent with diabetes    High levels of fetal hemoglobin interfere with the HbA1C  assay. Heterozygous hemoglobin variants (HbS, HgC, etc)do  not significantly interfere with this assay.   However, presence of multiple variants may affect accuracy.           EXAM:   Vitals:    03/24/25 0818   BP: (!) 141/82   Pulse: 64  "  Weight: 115.8 kg (255 lb 4.7 oz)   Height: 5' 5" (1.651 m)         General: Pt. is well-developed, well-nourished, appears stated age, in no acute distress, alert and oriented x 3.      Vascular: Dorsalis pedis and posterior tibial pulses are 2/4 bilaterally. 3 secs capillary refill time and toes are warm to touch.    There is reduced hair growth on the feet.    There is 1+ edema.  no varicosities.        Neurological:  Light touch, proprioception, and sharp/dull sensation are all intact bilaterally. Protective threshold with the [place] monofilament is diminished bilaterally.   +paresthesias      Dermatological:  The skin is thin    The toenails  1-5 L and 1-5 R  are greenish- yellow, long by 5-6mm and thickened by 2-3mm with subungual debris  .   There are no open wounds. There is no ecchymoses.  no erythema noted.   Skin diffusely dry and xerotic on the soles, worse on the left.   Multiple nucleated     Interdigital spaces are intact, no fissures    Skin atrophic, thin, dry and mildly hyperpigmented.         Musculoskeletal:    Muscle strength is 5/5 in all groups bilaterally.    Metatarsophalangeal, subtalar, and ankle range of motion are intact without crepitus.     Ankle equinus bilateral           Assessment / Plan:    Problem List Items Addressed This Visit    None  Visit Diagnoses         Type II diabetes mellitus with neurological manifestations    -  Primary      Chronic anticoagulation          Corn or callus          Dermatophytosis of nail                I counseled the patient on the patient's conditions, their implications and medical management.  Continue good nutrition and blood sugar control to help prevent podiatric complications of diabetes.   Maintain proper foot hygiene.   Continue wearing proper shoe gear, daily foot inspections, never walking without protective shoe gear, never putting sharp instruments to feet.  Shoe and activity modification as needed for relief.   Continue urea " "cream daily, especially on the LEFT heel.       Routine Foot Care    Date/Time: 3/24/2025 8:00 AM    Performed by: Stacey Rowland DPM  Authorized by: Stacey Rowland DPM    Time out: Immediately prior to procedure a "time out" was called to verify the correct patient, procedure, equipment, support staff and site/side marked as required.    Hyperkeratotic Skin Lesions?: Yes    Number of trimmed lesions:  1  Location(s):  Left Heel    Nail Care Type:  Debride  Location(s): All  (Left 1st Toe, Left 3rd Toe, Left 2nd Toe, Left 4th Toe, Left 5th Toe, Right 1st Toe, Right 2nd Toe, Right 3rd Toe, Right 4th Toe and Right 5th Toe)  Patient tolerance:  Patient tolerated the procedure well with no immediate complications     With patient's permission, the toenails mentioned above were reduced and debrided using a nail nipper, removing offending nail and debris.   Utilizing a #15 scalpel, I trimmed the corns and calluses at the above mentioned location.      The patient will continue to monitor the areas daily, inspect the feet, wear protective shoe gear when ambulatory, and moisturizer to maintain skin integrity.     "

## 2025-03-27 LAB
BACTERIAL VAGINOSIS DNA (OHS): NOT DETECTED
CANDIDA GLABRATA/KRUSEI DNA (OHS): NOT DETECTED
CANDIDA SPECIES DNA (OHS): NOT DETECTED
TRICHOMONAS VAGINALIS DNA (OHS): NOT DETECTED

## 2025-03-28 NOTE — PROGRESS NOTES
HISTORY OF PRESENT ILLNESS:    Alivia Thomas is a 59 y.o. female, , Patient's last menstrual period was 2018.,  presents for a routine exam and has no complaints.    History of abnormal pap: No  Last Pap: , HPV -   Last MM/22  Last Colonoscopy:     She does not desire STD screening.    The patient participates in regular exercise: yes.    The patient does not smoke.    The patient wears seatbelts.     Pt denies any domestic violence.    Past Medical History:   Diagnosis Date    Acute right MCA stroke 2024    Allergy     Anemia     Asthma     Bilateral shoulder bursitis     Cervical stenosis of spine     Chronic pain     DDD (degenerative disc disease), cervical     Depression 2019    Diabetes mellitus type II     DJD (degenerative joint disease) of knee 2014    Facet arthritis of lumbar region 2015    Facet syndrome 2015    GERD (gastroesophageal reflux disease)     Heartburn     Hyperlipidemia     Hypertension     Morbid obesity     Neuromuscular disorder     PADDY (obstructive sleep apnea)     Paroxysmal atrial fibrillation 2024    Proteinuria     Right carpal tunnel syndrome     Sacroiliitis 2018    Sacroiliitis     Sleep apnea     Uterine fibroid        Past Surgical History:   Procedure Laterality Date    CARPAL TUNNEL RELEASE      CARPAL TUNNEL RELEASE      left    CARPAL TUNNEL RELEASE      right    CATARACT EXTRACTION W/  INTRAOCULAR LENS IMPLANT Left 2023    DR. STOKES    CATARACT EXTRACTION W/  INTRAOCULAR LENS IMPLANT Right 2023        COLONOSCOPY N/A 06/15/2020    Procedure: COLONOSCOPY;  Surgeon: Jc Koo MD;  Location: [address]);  Service: Endoscopy;  Laterality: N/A;  COVID screening on 20 at [facility]  pt updated on drop off location and no visitor policy-    EPIDURAL STEROID INJECTION N/A 2023    Procedure: INJECTION, STEROID, EPIDURAL, L5/S1 SOONER DATE;   Surgeon: Israel Hurtado MD;  Location: [facility] PAIN MGT;  Service: Pain Management;  Laterality: N/A;    ESOPHAGOGASTRODUODENOSCOPY N/A 03/27/2019    Procedure: EGD (ESOPHAGOGASTRODUODENOSCOPY);  Surgeon: Robbin Bar MD;  Location: [facility] (2ND FLR);  Service: Endoscopy;  Laterality: N/A;  BMI 61.3/2nd floor case/svn    INJECTION OF JOINT Bilateral 06/09/2020    Procedure: INJECTION, JOINT, BILATERAL SI;  Surgeon: Lina Wagner MD;  Location: [facility] PAIN MGT;  Service: Pain Management;  Laterality: Bilateral;  B/L SI Joint Injection    INJECTION OF JOINT Bilateral 05/11/2021    Procedure: INJECTION, JOINT, SACROILIAC (SI) need consent;  Surgeon: Lina Wagner MD;  Location: [facility] PAIN T;  Service: Pain Management;  Laterality: Bilateral;    INJECTION OF JOINT Bilateral 5/16/2024    Procedure: INJECTION, JOINT BILATERAL SI AND GTB;  Surgeon: Israel Hurtado MD;  Location: [facility];  Service: Pain Management;  Laterality: Bilateral;  [phone]  2 WK F/U BENJI    JOINT REPLACEMENT Bilateral     with 2 revisions on rt    KNEE SURGERY  03/2010    orthroscope    KNEE SURGERY  06/19/2014    left TKR    LAPAROSCOPIC SLEEVE GASTRECTOMY N/A 08/28/2019    Procedure: GASTRECTOMY, SLEEVE, LAPAROSCOPIC with intraop EGD;  Surgeon: Heriberto Clements MD;  [facility];  Service: General;  Laterality: N/A;    PANNICULECTOMY N/A 06/14/2022    Procedure: PANNICULECTOMY;  Surgeon: Rhett Gray MD;  [facility];  Service: Plastics;  Laterality: N/A;    RADIOFREQUENCY ABLATION Right 07/09/2019    Procedure: RADIOFREQUENCY ABLATION;  Surgeon: Lina Wagner MD;  Location: [facility];  Service: Pain Management;  Laterality: Right;  RIGHT RFA L2,3,4,5  2 of 2    RADIOFREQUENCY ABLATION Left 12/19/2019    Procedure: RADIOFREQUENCY ABLATION, LEFT L2-L3-L4-L5 MEDIAL BRANCH 1 OF 2;  Surgeon: Lina Wagner MD;  Location: [facility] PAIN MGT;  Service: Pain Management;   Laterality: Left;    RADIOFREQUENCY ABLATION Right 12/31/2019    Procedure: RADIOFREQUENCY ABLATION, RIGHT L2-L3-L4-L5  2 OF 2;  Surgeon: Lina Wagner MD;  Location: [facility] PAIN MGT;  Service: Pain Management;  Laterality: Right;    RADIOFREQUENCY ABLATION Right 10/13/2020    Procedure: RADIOFREQUENCY ABLATION, Genicular Cooled 1 of 2;  Surgeon: Lina Wagner MD;  Location: [facility] PAIN MGT;  Service: Pain Management;  Laterality: Right;    RADIOFREQUENCY ABLATION Left 11/03/2020    Procedure: RADIOFREQUENCY ABLATION, GENICULAR COOLED  2 OF 2;  Surgeon: Lina Wagner MD;  Location: [facility]IN [facility]GT;  Service: Pain Management;  Laterality: Left;    RADIOFREQUENCY ABLATION Left 12/15/2020    Procedure: RADIOFREQUENCY ABLATION LEFT L2,3,4,5 1 of 2;  Surgeon: Lina Wagner MD;  Location: [facility] PAIN MGT;  Service: Pain Management;  Laterality: Left;    RADIOFREQUENCY ABLATION Right 12/29/2020    Procedure: RADIOFREQUENCY ABLATION RIGHT L2,3,4,5 2 of 2;  Surgeon: Lina Wagner MD;  Location: [facility] PAIN MGT;  Service: Pain Management;  Laterality: Right;    RADIOFREQUENCY ABLATION Left 06/29/2021    Procedure: RADIOFREQUENCY ABLATION GENICULAR COOLED;  Surgeon: Lina Wagner MD;  Location: [facility] PAIN MGT;  Service: Pain Management;  Laterality: Left;  1 of 2    RADIOFREQUENCY ABLATION Right 07/13/2021    Procedure: RADIOFREQUENCY ABLATION GENICULAR;  Surgeon: Lina Wagner MD;  Location: [facility] PAIN MGT;  Service: Pain Management;  Laterality: Right;  2 of 2    RADIOFREQUENCY ABLATION Right 08/24/2021    Procedure: RADIOFREQUENCY ABLATION, L2-L3-L4-L5 MEDIAL BRANCH 1 OF 2;  Surgeon: Lina Wagner MD;  Location: [facility] PAIN MGT;  Service: Pain Management;  Laterality: Right;    RADIOFREQUENCY ABLATION Left 09/11/2021    Procedure: RADIOFREQUENCY ABLATION, L2-L3-L4-L5 MEDIAL BR ANCH 2 OF 2;  Surgeon: Lina Wagner MD;  Location: [facility] PAIN MGT;  Service: Pain Management;  Laterality: Left;     RADIOFREQUENCY ABLATION Right 03/07/2022    Procedure: RADIOFREQUENCY ABLATION RIGHT L3,4,5 1 of 2, needs consent;  Surgeon: Israel Hurtado MD;  Location: [facility] PAIN MGT;  Service: Pain Management;  Laterality: Right;    RADIOFREQUENCY ABLATION Left 03/21/2022    Procedure: RADIOFREQUENCY ABLATION RIGHT L3,4,5 2 of 2, needs consent;  Surgeon: Israel Hurtado MD;  Location: [facility] PAIN MGT;  Service: Pain Management;  Laterality: Left;    RADIOFREQUENCY ABLATION Right 05/09/2022    Procedure: RADIOFREQUENCY ABLATION RIGHT GENICULAR COOLED 1 of 2;  Surgeon: Israel Hurtado MD;  [facility];  Service: Pain Management;  Laterality: Right;    RADIOFREQUENCY ABLATION Left 05/23/2022    Procedure: RADIOFREQUENCY ABLATION LEFT GENICULAR COOLED  2 of 2;  Surgeon: Israel Hurtado MD;  Location: [facility] PAIN [facility]GT;  Service: Pain Management;  Laterality: Left;    RADIOFREQUENCY ABLATION Right 12/12/2022    Procedure: RADIOFREQUENCY ABLATION RIGHT GENICULAR COOLED  ONE OF TWO  NEEDS CONSENT;  Surgeon: Israel Hurtado MD;  Location: [facility] PAIN [facility]GT;  Service: Pain Management;  Laterality: Right;    RADIOFREQUENCY ABLATION Left 01/09/2023    Procedure: RADIOFREQUENCY ABLATION LEFT GENICULAR COOLED  TWO OF TWO NEEDS CONSENT;  Surgeon: Israel Hurtado MD;  Location: [facility] PAIN [facility]GT;  Service: Pain Management;  Laterality: Left;    RADIOFREQUENCY ABLATION Right 03/06/2023    Procedure: RADIOFREQUENCY ABLATION RIGHT L3,L4,L5;  Surgeon: Israel Hurtado MD;  Location: [facility] PAIN [facility]GT;  Service: Pain Management;  Laterality: Right;    RADIOFREQUENCY ABLATION Left 05/22/2023    Procedure: RADIOFREQUENCY ABLATION LEFT L3,L4,L5;  Surgeon: Israel Hurtado MD;  Location: [facility]GT;  Service: Pain Management;  Laterality: Left;  4/3 LM TO R/S    RADIOFREQUENCY ABLATION Right 11/27/2023    Procedure: RADIOFREQUENCY ABLATION RIGHT L3, 4, 5 1 OF 3;  Surgeon: Israel Hurtado MD;  Location: [facility] PAIN MGT;  Service: Pain Management;  Laterality: Right;    RADIOFREQUENCY  ABLATION Right 01/22/2024    Procedure: RADIOFREQUENCY ABLATION RIGHT GENICULAR 3 NERVES 3 OF 3;  Surgeon: Israel Hurtado MD;  Location: [facility] PAIN MGT;  Service: Pain Management;  Laterality: Right;    RADIOFREQUENCY ABLATION Left 02/12/2024    Procedure: RADIOFREQUENCY ABLATION LEFT L3, 4, 5;  Surgeon: Israel Hurtado MD;  Location: [facility] PAIN MGT;  Service: Pain Management;  Laterality: Left;  [phone]    RADIOFREQUENCY ABLATION OF LUMBAR MEDIAL BRANCH NERVE AT SINGLE LEVEL Left 06/26/2018    Procedure: RADIOFREQUENCY ABLATION, NERVE, MEDIAL BRANCH, LUMBAR, 1 LEVEL;  Surgeon: Lina Wagner MD;  Location: [facility] PAIN MGT;  Service: Pain Management;  Laterality: Left;  Left Cooled RFA @ L2,3,4,5  [phone]  with Sedation    1 of 2    RADIOFREQUENCY ABLATION OF LUMBAR MEDIAL BRANCH NERVE AT SINGLE LEVEL Right 07/10/2018    Procedure: RADIOFREQUENCY ABLATION, NERVE, MEDIAL BRANCH, LUMBAR, 1 LEVEL;  Surgeon: Lina Wagner MD;  Location: [facility] PAIN MGT;  Service: Pain Management;  Laterality: Right;  RIght Cooled RFA @ L2,3,4,5  [phone] with Sedation    2 of 2    REVISION OF KNEE ARTHROPLASTY Right 7/18/2024    Procedure: REVISION, ARTHROPLASTY, KNEE: RIGHT;  Surgeon: Theodore Swan MD;  Location: [address];  Service: Orthopedics;  Laterality: Right;    TRANSFORAMINAL EPIDURAL INJECTION OF STEROID N/A 1/13/2025    Procedure: LUMBAR L5/S1 IL KERI *ELIQUIS CLEARANCE IN CHART*;  Surgeon: Israel Hurtado MD;  Location: [facility] PAIN MGT;  Service: Pain Management;  Laterality: N/A;  2 WK F/U SUNDEEP    TRIGGER FINGER RELEASE Right 2017    TRIGGER FINGER RELEASE Left 07/29/2019    Procedure: RELEASE, TRIGGER FINGER, Left Thumb;  Surgeon: Velma Garcia MD;  Location: [facility] OR;  Service: Orthopedics;  Laterality: Left;  Local w/ MAC       MEDICATIONS AND ALLERGIES:      Current Outpatient Medications:     acetaminophen (TYLENOL) 650 MG TbSR, Take 1 tablet (650 mg total) by mouth every 8 (eight) hours as  needed (pain)., Disp: 90 tablet, Rfl: 1    albuterol (PROVENTIL/VENTOLIN HFA) 90 mcg/actuation inhaler, INHALE TWO PUFFS INTO LUNGS EVERY 4 TO 6 HOURS AS NEEDED FOR SHORTNESS OF BREATH AND FOR WHEEZING, Disp: 54 g, Rfl: 3    allopurinoL (ZYLOPRIM) 300 MG tablet, TAKE 1 TABLET (300 MG TOTAL) BY MOUTH 2 (TWO) TIMES DAILY., Disp: 180 tablet, Rfl: 1    apixaban (ELIQUIS) 5 mg Tab, Take 1 tablet (5 mg total) by mouth 2 (two) times daily., Disp: 60 tablet, Rfl: 6    atorvastatin (LIPITOR) 80 MG tablet, Take 1 tablet (80 mg total) by mouth once daily., Disp: 90 tablet, Rfl: 1    azithromycin (Z-MICKI) 250 MG tablet, follow package directions, Disp: 6 tablet, Rfl: 0    b complex vitamins tablet, Take 1 tablet by mouth every other day. , Disp: , Rfl:     calcium citrate/vitamin D2 (ISIAH-CITRATE ORAL), Take 500 mg by mouth once daily., Disp: , Rfl:     colchicine (MITIGARE) 0.6 mg Cap, One daily as needed for gout flare, Disp: 90 capsule, Rfl: 1    cyanocobalamin (VITAMIN B-12) 1000 MCG tablet, Take 100 mcg by mouth every morning., Disp: , Rfl:     FLUoxetine 40 MG capsule, Take 1 capsule (40 mg total) by mouth once daily., Disp: 90 capsule, Rfl: 3    LIDOcaine (XYLOCAINE) 5 % Oint ointment, APPLY TOPICALLY AS NEEDED., Disp: 35.44 g, Rfl: 6    LIDOcaine-prilocaine (EMLA) cream, APPLY TOPICALLY AS NEEDED. FOR FOOT PAIN, Disp: 30 g, Rfl: 2    MULTIVIT-IRON-MIN-FOLIC ACID 3,500-18-0.4 UNIT-MG-MG ORAL CHEW, Take 1 tablet by mouth 2 (two) times daily. , Disp: , Rfl:     nystatin (MYCOSTATIN) powder, Apply topically 2 (two) times daily., Disp: 60 g, Rfl: 3    omeprazole (PRILOSEC) 40 MG capsule, Take 1 capsule (40 mg total) by mouth once daily., Disp: 90 capsule, Rfl: 3    oxybutynin (DITROPAN-XL) 5 MG TR24, Take 1 tablet (5 mg total) by mouth once daily., Disp: 90 tablet, Rfl: 3    oxyCODONE-acetaminophen (PERCOCET)  mg per tablet, Take 1 tablet by mouth every 6 to 8 hours as needed for Pain (for severe pain)., Disp: 105  tablet, Rfl: 0    semaglutide (OZEMPIC) 2 mg/dose (8 mg/3 mL) PnIj, Inject 2 mg into the skin every 7 days., Disp: 3 mL, Rfl: 11    urea (CARMOL) 40 % Crea, Apply topically once daily. To dry skin on the feet., Disp: 120 g, Rfl: 5    vitamin D 185 MG Tab, Take 5,000 mg by mouth every morning. , Disp: , Rfl:     blood pressure monitor (BLOOD PRESSURE KIT) Kit, 1 kit by Misc.(Non-Drug; Combo Route) route Daily. Check blood pressure daily, Disp: 1 each, Rfl: 0    fluticasone propionate (FLONASE) 50 mcg/actuation nasal spray, 1 SPRAY BY EACH NOSTRIL ROUTE 2 TIMES DAILY AS NEEDED FOR RHINITIS., Disp: 48 g, Rfl: 3    naloxone (NARCAN) 4 mg/actuation Spry, 4mg by nasal route as needed for opioid overdose; may repeat every 2-3 minutes in alternating nostrils until medical help arrives. Call 911, Disp: 1 each, Rfl: 11    prednisoLONE acetate (PRED FORTE) 1 % DrpS, Place 1 drop into the right eye 3 (three) times daily., Disp: , Rfl:     senna-docusate 8.6-50 mg (SENNA WITH DOCUSATE SODIUM) 8.6-50 mg per tablet, Take 1 tablet by mouth once daily., Disp: 30 tablet, Rfl: 0  No current facility-administered medications for this visit.    Facility-Administered Medications Ordered in Other Visits:     0.9%  NaCl infusion, , Intravenous, Continuous, Lina Wagner MD, Last Rate: 25 mL/hr at 12/15/20 1506, 25 mL/hr at 12/15/20 1506    0.9%  NaCl infusion, , Intravenous, Continuous, Ciro Chung MD    0.9%  NaCl infusion, , Intravenous, Continuous, Santos Barron MD    Review of patient's allergies indicates:   Allergen Reactions    Strawberry Anaphylaxis       Family History   Problem Relation Name Age of Onset    Diabetes Mother      Cataracts Mother      Diabetes Father      Cataracts Father      Hypertension Sister      Diabetes Sister      No Known Problems Sister      Cancer Sister          lymphoma     Coronary artery disease Brother      Diabetes Brother      Diabetes Brother      Hypotension Brother      Kidney  failure Brother      Hypotension Brother      Amblyopia Neg Hx      Blindness Neg Hx      Glaucoma Neg Hx      Macular degeneration Neg Hx      Retinal detachment Neg Hx      Strabismus Neg Hx      Stroke Neg Hx      Thyroid disease Neg Hx      Anesthesia problems Neg Hx         Social History     Socioeconomic History    Marital status:    Tobacco Use    Smoking status: Never     Passive exposure: Never    Smokeless tobacco: Never   Substance and Sexual Activity    Alcohol use: Not Currently     Comment: occasionally     Drug use: No    Sexual activity: Yes     Partners: Female     Birth control/protection: None   Social History Narrative    Disabled. The patient is the youngest of 6 siblings.  Lives with single-sex partner.                  Social Drivers of Health     Financial Resource Strain: Low Risk  (2/17/2025)    Overall Financial Resource Strain (CARDIA)     Difficulty of Paying Living Expenses: Not very hard   Food Insecurity: No Food Insecurity (2/17/2025)    Hunger Vital Sign     Worried About Running Out of Food in the Last Year: Never true     Ran Out of Food in the Last Year: Never true   Transportation Needs: No Transportation Needs (2/17/2025)    PRAPARE - Transportation     Lack of Transportation (Medical): No     Lack of Transportation (Non-Medical): No   Physical Activity: Inactive (2/17/2025)    Exercise Vital Sign     Days of Exercise per Week: 0 days     Minutes of Exercise per Session: 10 min   Stress: No Stress Concern Present (2/17/2025)    [employer] - Occupational Stress Questionnaire     Feeling of Stress : Not at all   Housing Stability: Low Risk  (2/17/2025)    Housing Stability Vital Sign     Unable to Pay for Housing in the Last Year: No     Number of Times Moved in the Last Year: 0     Homeless in the Last Year: No       COMPREHENSIVE GYN HISTORY:  PAP History: Denies abnormal Paps.  Infection History: Denies STDs. Denies PID.  Benign History:  Denies uterine fibroids. Denies ovarian cysts. Denies endometriosis. Denies other conditions.  Cancer History: Denies cervical cancer. Denies uterine cancer or hyperplasia. Denies ovarian cancer. Denies vulvar cancer or pre-cancer. Denies vaginal cancer or pre-cancer. Denies breast cancer. Denies colon cancer.  Sexual Activity History: Reports currently being sexually active  Menstrual History: Monthly. Mod then light flow.   Dysmenorrhea History: Reports mild dysmenorrhea.     ROS:  GENERAL: No weight changes. No swelling. No fatigue. No fever.  CARDIOVASCULAR: No chest pain. No shortness of breath. No leg cramps.   NEUROLOGICAL: No headaches. No vision changes.  BREASTS: No pain. No lumps. No discharge.  ABDOMEN: No pain. No nausea. No vomiting. No diarrhea. No constipation.  REPRODUCTIVE: No abnormal bleeding.   VULVA: No pain. No lesions. No itching.  VAGINA: No relaxation. No itching. No odor. No discharge. No lesions.  URINARY: No incontinence. No nocturia. No frequency. No dysuria.    Wt 110.7 kg (244 lb 0.8 oz)   LMP 06/26/2018   BMI 40.61 kg/m²     PE:  APPEARANCE: Well nourished, well developed, in no acute distress.  AFFECT: WNL, alert and oriented x 3.  SKIN: No acne or hirsutism.  NECK: Neck symmetric, without masses or thyromegaly.  NODES: No inguinal, cervical, axillary or femoral lymph node enlargement.  CHEST: Good respiratory effort.   ABDOMEN: Soft. No tenderness or masses. No hepatosplenomegaly. No hernias.  BREASTS: Symmetrical, no skin changes, visible lesions, palpable masses or nipple discharge bilaterally.  PELVIC: External female genitalia without lesions.  Female hair distribution. Adequate perineal body, Normal urethral meatus. Vagina moist and well rugated without lesions or discharge.  No significant cystocele or rectocele present. Cervix pink without lesions, discharge or tenderness. Uterus feels normal in size, mobile and nontender. Adnexa no masses or tenderness appreciated.   Exam  limited by body habitus, patient made aware and voices understanding.  EXTREMITIES: No edema    DIAGNOSIS:  1. Encounter for gynecological examination without abnormal finding    2. Normal gynecologic examination    3. Screen for STD (sexually transmitted disease)    4. Yeast vaginitis      PLAN:    Orders Placed This Encounter    HPV High Risk Genotypes, PCR    C. trachomatis/N. gonorrhoeae by AMP DNA [facility]; Cervicovaginal    Hepatitis Panel, Acute    Treponema Pallidium Antibodies IgG, IgM    HIV 1/2 Ag/Ab (4th Gen)    Vaginosis Screen by DNA Probe    Liquid-Based Pap Smear, Screening    fluconazole (DIFLUCAN) 150 MG Tab       COUNSELING:  The patient was counseled today on:  -A.C.S. Pap and pelvic exam guidelines (pap every 3 years), recomendations for yearly mammogram;  -to follow up with her PCP for other health maintenance.    FOLLOW-UP with me annually.   Answers submitted by the patient for this visit:  Gynecologic Exam Questionnaire  (Submitted on 3/24/2025)  Chief Complaint: Gynecologic exam  genital itching: No  genital lesions: No  genital odor: No  genital rash: No  missed menses: No  pelvic pain: No  vaginal bleeding: No  vaginal discharge: No  Pregnant now?: No  abdominal pain: No  anorexia: No  back pain: Yes  chills: No  constipation: No  diarrhea: No  discolored urine: No  dysuria: No  fever: No  flank pain: No  frequency: No  headaches: No  hematuria: No  nausea: No  painful intercourse: No  rash: No  urgency: No  vomiting: No  Sexual activity: sexually active  Partner with STD symptoms: no  Menstrual history: postmenopausal  STD: No  abdominal surgery: Yes   section: No  Ectopic pregnancy: No  Endometriosis: No  herpes simplex: No  gynecological surgery: No  menorrhagia: No  metrorrhagia: No  miscarriage: No  ovarian cysts: No  perineal abscess: No  PID: No  terminated pregnancy: No  vaginosis: No

## 2025-03-29 LAB
C TRACH DNA SPEC QL NAA+PROBE: NOT DETECTED
CTGC SOURCE (OHS) ORD-325: NORMAL
INSULIN SERPL-ACNC: NORMAL U[IU]/ML
LAB AP BETHESDA CATEGORY: NORMAL
LAB AP CLINICAL FINDINGS: NORMAL
LAB AP CONTRACEPTIVES: NORMAL
LAB AP LMP DATE: NORMAL
LAB AP OCHS PAP SPECIMEN ADEQUACY: NORMAL
LAB AP OHS PAP INTERPRETATION: NORMAL
LAB AP PAP DISCLAIMER COMMENTS: NORMAL
LAB AP PAP ESTROGEN REPLACEMENT THERAPY: NORMAL
LAB AP PAP PMP: NORMAL
LAB AP PAP PREVIOUS BX: NORMAL
LAB AP PAP PRIOR TREATMENT: NORMAL
LAB AP PERFORMING LOCATION(S): NORMAL
N GONORRHOEA DNA UR QL NAA+PROBE: NOT DETECTED

## 2025-04-04 ENCOUNTER — PATIENT MESSAGE (OUTPATIENT)
Dept: ENDOSCOPY | Facility: HOSPITAL | Age: 60
End: 2025-04-04
Payer: MEDICARE

## 2025-04-08 ENCOUNTER — PATIENT MESSAGE (OUTPATIENT)
Dept: BARIATRICS | Facility: CLINIC | Age: 60
End: 2025-04-08
Payer: MEDICARE

## 2025-04-10 ENCOUNTER — PATIENT MESSAGE (OUTPATIENT)
Dept: OBSTETRICS AND GYNECOLOGY | Facility: CLINIC | Age: 60
End: 2025-04-10
Payer: MEDICARE

## 2025-04-10 ENCOUNTER — TELEPHONE (OUTPATIENT)
Dept: OBSTETRICS AND GYNECOLOGY | Facility: CLINIC | Age: 60
End: 2025-04-10
Payer: MEDICARE

## 2025-04-10 NOTE — TELEPHONE ENCOUNTER
----- Message from Malina sent at 4/10/2025 10:54 AM CDT -----  Contact: Self/ [phone]  [email or web address] Patient is calling for Medical Advice regarding: A problem How long has patient had these symptoms:NaPharmacy name and phone#:NAPatient wants a call back or thru [facility]: Call back Comments: pt said that she is calling in regards to needing to speak with the nurse she stated that she has a problem(she would not state what her problem is ) but is asking for a return call asap Please advise patient replies from provider may take up to 48 hours.

## 2025-04-12 DIAGNOSIS — M25.561 CHRONIC PAIN OF BOTH KNEES: ICD-10-CM

## 2025-04-12 DIAGNOSIS — M51.369 DDD (DEGENERATIVE DISC DISEASE), LUMBAR: ICD-10-CM

## 2025-04-12 DIAGNOSIS — M17.0 PRIMARY OSTEOARTHRITIS OF BOTH KNEES: ICD-10-CM

## 2025-04-12 DIAGNOSIS — G89.29 CHRONIC PAIN OF BOTH KNEES: ICD-10-CM

## 2025-04-12 DIAGNOSIS — M47.816 SPONDYLOSIS OF LUMBAR REGION WITHOUT MYELOPATHY OR RADICULOPATHY: ICD-10-CM

## 2025-04-12 DIAGNOSIS — G89.4 CHRONIC PAIN SYNDROME: ICD-10-CM

## 2025-04-12 DIAGNOSIS — M25.562 CHRONIC PAIN OF BOTH KNEES: ICD-10-CM

## 2025-04-14 ENCOUNTER — ANESTHESIA EVENT (OUTPATIENT)
Dept: ENDOSCOPY | Facility: HOSPITAL | Age: 60
End: 2025-04-14
Payer: MEDICARE

## 2025-04-14 ENCOUNTER — HOSPITAL ENCOUNTER (OUTPATIENT)
Facility: HOSPITAL | Age: 60
Discharge: HOME | End: 2025-04-14
Attending: INTERNAL MEDICINE
Payer: MEDICARE

## 2025-04-14 ENCOUNTER — ANESTHESIA (OUTPATIENT)
Dept: ENDOSCOPY | Facility: HOSPITAL | Age: 60
End: 2025-04-14
Payer: MEDICARE

## 2025-04-14 VITALS
WEIGHT: 241 LBS | BODY MASS INDEX: 40.15 KG/M2 | HEART RATE: 60 BPM | TEMPERATURE: 98 F | DIASTOLIC BLOOD PRESSURE: 79 MMHG | SYSTOLIC BLOOD PRESSURE: 140 MMHG | OXYGEN SATURATION: 100 % | RESPIRATION RATE: 12 BRPM | HEIGHT: 65 IN

## 2025-04-14 DIAGNOSIS — Z12.11 COLON CANCER SCREENING: ICD-10-CM

## 2025-04-14 DIAGNOSIS — K21.9 GERD (GASTROESOPHAGEAL REFLUX DISEASE): ICD-10-CM

## 2025-04-14 DIAGNOSIS — K21.9 GASTROESOPHAGEAL REFLUX DISEASE, UNSPECIFIED WHETHER ESOPHAGITIS PRESENT: Primary | Chronic | ICD-10-CM

## 2025-04-14 PROCEDURE — 37000008 HC ANESTHESIA 1ST 15 MINUTES: Performed by: INTERNAL MEDICINE

## 2025-04-14 PROCEDURE — E9220 PRA ENDO ANESTHESIA: HCPCS | Mod: ,,, | Performed by: NURSE ANESTHETIST, CERTIFIED REGISTERED

## 2025-04-14 PROCEDURE — G0121 COLON CA SCRN NOT HI RSK IND: HCPCS | Performed by: INTERNAL MEDICINE

## 2025-04-14 PROCEDURE — 63600175 PHARM REV CODE 636 W HCPCS: Performed by: NURSE ANESTHETIST, CERTIFIED REGISTERED

## 2025-04-14 PROCEDURE — 37000009 HC ANESTHESIA EA ADD 15 MINS: Performed by: INTERNAL MEDICINE

## 2025-04-14 PROCEDURE — G0121 COLON CA SCRN NOT HI RSK IND: HCPCS | Mod: ,,, | Performed by: INTERNAL MEDICINE

## 2025-04-14 PROCEDURE — 25000003 PHARM REV CODE 250: Performed by: ANESTHESIOLOGY

## 2025-04-14 PROCEDURE — 43235 EGD DIAGNOSTIC BRUSH WASH: CPT | Mod: 51,,, | Performed by: INTERNAL MEDICINE

## 2025-04-14 PROCEDURE — 43235 EGD DIAGNOSTIC BRUSH WASH: CPT | Performed by: INTERNAL MEDICINE

## 2025-04-14 RX ORDER — PROPOFOL 10 MG/ML
VIAL (ML) INTRAVENOUS CONTINUOUS PRN
Status: DISCONTINUED | OUTPATIENT
Start: 2025-04-14 | End: 2025-04-14

## 2025-04-14 RX ORDER — LIDOCAINE HYDROCHLORIDE 20 MG/ML
INJECTION INTRAVENOUS
Status: DISCONTINUED | OUTPATIENT
Start: 2025-04-14 | End: 2025-04-14

## 2025-04-14 RX ORDER — PROPOFOL 10 MG/ML
VIAL (ML) INTRAVENOUS
Status: DISCONTINUED | OUTPATIENT
Start: 2025-04-14 | End: 2025-04-14

## 2025-04-14 RX ORDER — OXYCODONE AND ACETAMINOPHEN 10; 325 MG/1; MG/1
1 TABLET ORAL
Qty: 105 TABLET | Refills: 0 | Status: SHIPPED | OUTPATIENT
Start: 2025-04-14

## 2025-04-14 RX ORDER — SODIUM CHLORIDE 9 MG/ML
INJECTION, SOLUTION INTRAVENOUS CONTINUOUS
Status: DISCONTINUED | OUTPATIENT
Start: 2025-04-14 | End: 2025-04-14 | Stop reason: HOSPADM

## 2025-04-14 RX ADMIN — SODIUM CHLORIDE: 0.9 INJECTION, SOLUTION INTRAVENOUS at 12:04

## 2025-04-14 RX ADMIN — PROPOFOL 70 MG: 10 INJECTION, EMULSION INTRAVENOUS at 01:04

## 2025-04-14 RX ADMIN — LIDOCAINE HYDROCHLORIDE 100 MG: 20 INJECTION INTRAVENOUS at 01:04

## 2025-04-14 RX ADMIN — PROPOFOL 125 MCG/KG/MIN: 10 INJECTION, EMULSION INTRAVENOUS at 01:04

## 2025-04-14 NOTE — TELEPHONE ENCOUNTER
Patient requesting refill on Percocet  Last office visit 1/27/25   shows last refill on 3/19/25  Patient have a pain contract on file with [facility] department  Patient last UDS 12/18/24

## 2025-04-14 NOTE — ANESTHESIA POSTPROCEDURE EVALUATION
Anesthesia Post Evaluation    Patient: Alivia Thomas    Procedure(s) Performed: Procedure(s) (LRB):  COLONOSCOPY, SCREENING, LOW RISK PATIENT (N/A)  EGD (ESOPHAGOGASTRODUODENOSCOPY) (N/A)    Final Anesthesia Type: general      Patient location during evaluation: GI PACU  Patient participation: Yes- Able to Participate  Level of consciousness: awake and alert and oriented  Post-procedure vital signs: reviewed and stable  Pain management: adequate  Airway patency: patent    PONV status at discharge: No PONV  Anesthetic complications: no      Cardiovascular status: stable  Respiratory status: unassisted, spontaneous ventilation and room air  Hydration status: euvolemic  Follow-up not needed.          Vitals Value Taken Time   /71 04/14/25 13:40   Temp 36.7 °C (98 °F) 04/14/25 13:40   Pulse 65 04/14/25 13:40   Resp 13 04/14/25 13:40   SpO2 96 % 04/14/25 13:40         No case tracking events are documented in the log.      Pain/Aracely Score: No data recorded

## 2025-04-14 NOTE — ANESTHESIA PREPROCEDURE EVALUATION
04/14/2025  Alivia Thomas is a 59 y.o., female.    Pre-operative evaluation for Procedure(s) (LRB):  COLONOSCOPY, SCREENING, LOW RISK PATIENT (N/A)  EGD (ESOPHAGOGASTRODUODENOSCOPY) (N/A)    Alivia Thomas is a 59 y.o. female     Problem List[1]    Review of patient's allergies indicates:   Allergen Reactions    Strawberry Anaphylaxis       Medications Ordered Prior to Encounter[2]    Past Surgical History:   Procedure Laterality Date    CARPAL TUNNEL RELEASE      CARPAL TUNNEL RELEASE  1980s    left    CARPAL TUNNEL RELEASE  2012    right    CATARACT EXTRACTION W/  INTRAOCULAR LENS IMPLANT Left 08/08/2023    DR. STOKES    CATARACT EXTRACTION W/  INTRAOCULAR LENS IMPLANT Right 08/29/2023        COLONOSCOPY N/A 06/15/2020    Procedure: COLONOSCOPY;  Surgeon: Jc Koo MD;  Location: [facility] (4TH FLR);  Service: Endoscopy;  Laterality: N/A;  COVID screening on 6/13/20 at [facility]  pt updated on drop off location and no visitor [facility]-    EPIDURAL STEROID INJECTION N/A 07/24/2023    Procedure: INJECTION, STEROID, EPIDURAL, L5/S1 SOONER DATE;  Surgeon: Israel Hurtado MD;  Location: [facility] PAIN MGT;  Service: Pain Management;  Laterality: N/A;    ESOPHAGOGASTRODUODENOSCOPY N/A 03/27/2019    Procedure: EGD (ESOPHAGOGASTRODUODENOSCOPY);  Surgeon: Robbin Bar MD;  Location: [facility] (2ND FLR);  Service: Endoscopy;  Laterality: N/A;  BMI 61.3/2nd floor case/svn    INJECTION OF JOINT Bilateral 06/09/2020    Procedure: INJECTION, JOINT, BILATERAL SI;  Surgeon: Lina Wagner MD;  Location: [facility] PAIN MGT;  Service: Pain Management;  Laterality: Bilateral;  B/L SI Joint Injection    INJECTION OF JOINT Bilateral 05/11/2021    Procedure: INJECTION, JOINT, SACROILIAC (SI) need consent;  Surgeon: Lina Wagner MD;  Location: [facility] PAIN MGT;  Service: Pain  Management;  Laterality: Bilateral;    INJECTION OF JOINT Bilateral 5/16/2024    Procedure: INJECTION, JOINT BILATERAL SI AND GTB;  Surgeon: Israel Hurtado MD;  Location: [facility] PAIN MGT;  Service: Pain Management;  Laterality: Bilateral;  [phone]  2 WK F/U BENJI    JOINT REPLACEMENT Bilateral     with 2 revisions on rt    KNEE SURGERY  03/2010    orthroscope    KNEE SURGERY  06/19/2014    left TKR    LAPAROSCOPIC SLEEVE GASTRECTOMY N/A 08/28/2019    Procedure: GASTRECTOMY, SLEEVE, LAPAROSCOPIC with intraop EGD;  Surgeon: Heriberto Clements MD;  Location: [facility] FLR;  Service: General;  Laterality: N/A;    PANNICULECTOMY N/A 06/14/2022    Procedure: PANNICULECTOMY;  Surgeon: Rhett Gray MD;  [facility];  Service: Plastics;  Laterality: N/A;    RADIOFREQUENCY ABLATION Right 07/09/2019    Procedure: RADIOFREQUENCY ABLATION;  Surgeon: Lina Wagner MD;  Location: [facility] PAIN MGT;  Service: Pain Management;  Laterality: Right;  RIGHT RFA L2,3,4,5  2 of 2    RADIOFREQUENCY ABLATION Left 12/19/2019    Procedure: RADIOFREQUENCY ABLATION, LEFT L2-L3-L4-L5 MEDIAL BRANCH 1 OF 2;  Surgeon: Lina Wagner MD;  Location: [facility] PAIN MGT;  Service: Pain Management;  Laterality: Left;    RADIOFREQUENCY ABLATION Right 12/31/2019    Procedure: RADIOFREQUENCY ABLATION, RIGHT L2-L3-L4-L5  2 OF 2;  Surgeon: Lina Wagner MD;  Location: [facility] PAIN MGT;  Service: Pain Management;  Laterality: Right;    RADIOFREQUENCY ABLATION Right 10/13/2020    Procedure: RADIOFREQUENCY ABLATION, Genicular Cooled 1 of 2;  Surgeon: Lina Wagner MD;  Location: [facility] PAIN MGT;  Service: Pain Management;  Laterality: Right;    RADIOFREQUENCY ABLATION Left 11/03/2020    Procedure: RADIOFREQUENCY ABLATION, GENICULAR COOLED  2 OF 2;  Surgeon: Lina Wagner MD;  Location: [facility] PAIN MGT;  Service: Pain Management;  Laterality: Left;    RADIOFREQUENCY ABLATION Left 12/15/2020    Procedure:  RADIOFREQUENCY ABLATION LEFT L2,3,4,5 1 of 2;  Surgeon: Lina Wagner MD;  Location: [facility] PAIN MGT;  Service: Pain Management;  Laterality: Left;    RADIOFREQUENCY ABLATION Right 12/29/2020    Procedure: RADIOFREQUENCY ABLATION RIGHT L2,3,4,5 2 of 2;  Surgeon: Lina Wagner MD;  Location: [facility] PAIN MGT;  Service: Pain Management;  Laterality: Right;    RADIOFREQUENCY ABLATION Left 06/29/2021    Procedure: RADIOFREQUENCY ABLATION GENICULAR COOLED;  Surgeon: Lina Wagner MD;  Location: [facility] PAIN MGT;  Service: Pain Management;  Laterality: Left;  1 of 2    RADIOFREQUENCY ABLATION Right 07/13/2021    Procedure: RADIOFREQUENCY ABLATION GENICULAR;  Surgeon: Lina Wagner MD;  Location: [facility] PAIN MGT;  Service: Pain Management;  Laterality: Right;  2 of 2    RADIOFREQUENCY ABLATION Right 08/24/2021    Procedure: RADIOFREQUENCY ABLATION, L2-L3-L4-L5 MEDIAL BRANCH 1 OF 2;  Surgeon: Lina Wagner MD;  Location: [facility] PAIN MGT;  Service: Pain Management;  Laterality: Right;    RADIOFREQUENCY ABLATION Left 09/11/2021    Procedure: RADIOFREQUENCY ABLATION, L2-L3-L4-L5 MEDIAL BR ANCH 2 OF 2;  Surgeon: Lina Wagner MD;  Location: [facility] PAIN MGT;  Service: Pain Management;  Laterality: Left;    RADIOFREQUENCY ABLATION Right 03/07/2022    Procedure: RADIOFREQUENCY ABLATION RIGHT L3,4,5 1 of 2, needs consent;  Surgeon: Israel Hurtado MD;  Location: [facility] PAIN MGT;  Service: Pain Management;  Laterality: Right;    RADIOFREQUENCY ABLATION Left 03/21/2022    Procedure: RADIOFREQUENCY ABLATION RIGHT L3,4,5 2 of 2, needs consent;  Surgeon: Israel Hurtado MD;  Location: [facility] PAIN MGT;  Service: Pain Management;  Laterality: Left;    RADIOFREQUENCY ABLATION Right 05/09/2022    Procedure: RADIOFREQUENCY ABLATION RIGHT GENICULAR COOLED 1 of 2;  Surgeon: Israel Hurtado MD;  Location: [facility] PAIN MGT;  Service: Pain Management;  Laterality: Right;    RADIOFREQUENCY ABLATION Left 05/23/2022    Procedure:  RADIOFREQUENCY ABLATION LEFT GENICULAR COOLED  2 of 2;  Surgeon: Israel Hurtado MD;  Location: [facility] [place] [facility]IN MGT;  Service: Pain Management;  Laterality: Left;    RADIOFREQUENCY ABLATION Right 12/12/2022    Procedure: RADIOFREQUENCY ABLATION RIGHT GENICULAR COOLED  ONE OF TWO  NEEDS CONSENT;  Surgeon: Israel Hurtado MD;  Location: [facility] [place] PAIN MGT;  Service: Pain Management;  Laterality: Right;    RADIOFREQUENCY ABLATION Left 01/09/2023    Procedure: RADIOFREQUENCY ABLATION LEFT GENICULAR COOLED  TWO OF TWO NEEDS CONSENT;  Surgeon: Israel Hurtado MD;  Location: [facility] [place] PAIN MGT;  Service: Pain Management;  Laterality: Left;    RADIOFREQUENCY ABLATION Right 03/06/2023    Procedure: RADIOFREQUENCY ABLATION RIGHT L3,L4,L5;  Surgeon: Israel Hurtado MD;  Location: [facility] [place] PAIN MGT;  Service: Pain Management;  Laterality: Right;    RADIOFREQUENCY ABLATION Left 05/22/2023    Procedure: RADIOFREQUENCY ABLATION LEFT L3,L4,L5;  Surgeon: Israel Hurtado MD;  Location: [facility] [place] PAIN MGT;  Service: Pain Management;  Laterality: Left;  4/3 LM TO R/S    RADIOFREQUENCY ABLATION Right 11/27/2023    Procedure: RADIOFREQUENCY ABLATION RIGHT L3, 4, 5 1 OF 3;  Surgeon: Israel Hurtado MD;  Location: [facility] [place] PAIN MGT;  Service: Pain Management;  Laterality: Right;    RADIOFREQUENCY ABLATION Right 01/22/2024    Procedure: RADIOFREQUENCY ABLATION RIGHT GENICULAR 3 NERVES 3 OF 3;  Surgeon: Israel Hurtado MD;  Location: [facility] [place] PAIN MGT;  Service: Pain Management;  Laterality: Right;    RADIOFREQUENCY ABLATION Left 02/12/2024    Procedure: RADIOFREQUENCY ABLATION LEFT L3, 4, 5;  Surgeon: Israel Hurtado MD;  Location: [facility] [place] PAIN MGT;  Service: Pain Management;  Laterality: Left;  [phone]    RADIOFREQUENCY ABLATION OF LUMBAR MEDIAL BRANCH NERVE AT SINGLE LEVEL Left 06/26/2018    Procedure: RADIOFREQUENCY ABLATION, NERVE, MEDIAL BRANCH, LUMBAR, 1 LEVEL;  Surgeon: Lina Wagner MD;  Location: [facility] [place] PAIN MGT;  Service: Pain Management;  Laterality: Left;  Left Cooled  RFA @ L2,3,4,5  [phone]  with Sedation    1 of 2    RADIOFREQUENCY ABLATION OF LUMBAR MEDIAL BRANCH NERVE AT SINGLE LEVEL Right 07/10/2018    Procedure: RADIOFREQUENCY ABLATION, NERVE, MEDIAL BRANCH, LUMBAR, 1 LEVEL;  Surgeon: Lina Wagner MD;  Location: [facility] PAIN MGT;  Service: Pain Management;  Laterality: Right;  RIght Cooled RFA @ L2,3,4,5  [phone] with Sedation    2 of 2    REVISION OF KNEE ARTHROPLASTY Right 7/18/2024    Procedure: REVISION, ARTHROPLASTY, KNEE: RIGHT;  Surgeon: Theodore Swan MD;  Location: [facility] FLR;  Service: Orthopedics;  Laterality: Right;    TRANSFORAMINAL EPIDURAL INJECTION OF STEROID N/A 1/13/2025    Procedure: LUMBAR L5/S1 IL KERI *ELIQUIS CLEARANCE IN CHART*;  Surgeon: Israel Hurtado MD;  Location: [facility] PAIN MGT;  Service: Pain Management;  Laterality: N/A;  2 WK F/U SUNDEEP    TRIGGER FINGER RELEASE Right 2017    TRIGGER FINGER RELEASE Left 07/29/2019    Procedure: RELEASE, TRIGGER FINGER, Left Thumb;  Surgeon: Velma Garcia MD;  Location: [facility];  Service: Orthopedics;  Laterality: Left;  Local w/ MAC   2024 Summary    Left Ventricle: The left ventricle is normal in size. Ventricular mass is normal. Normal wall thickness. Normal wall motion. There is normal systolic function with a visually estimated ejection fraction of 60 - 65%. There is normal diastolic function. Normal left ventricular filling pressure.    Right Ventricle: Normal right ventricular cavity size. Wall thickness is normal. Right ventricle wall motion  is normal. Systolic function is normal.    Left Atrium: Normal left atrial size.    Right Atrium: Normal right atrial size.    Aortic Valve: The aortic valve is a trileaflet valve. There is mild annular calcification present.    Aorta: Aortic root is normal in size measuring 3.29 cm. Ascending aorta is normal measuring 3.12 cm.    Pulmonary Artery: The estimated pulmonary artery systolic pressure is 27 mmHg.    IVC/SVC: Normal  venous pressure at 3 mmHg.    Pre-op Assessment    I have reviewed the Patient Summary Reports.     I have reviewed the Nursing Notes. I have reviewed the NPO Status.   I have reviewed the Medications.     Review of Systems  Anesthesia Hx:    Readmitted to hospital with ICU admission after panniculectomy with hypoxia/hypercarbia               Cardiovascular:     Hypertension                                          Pulmonary:    Asthma    Sleep Apnea                Hepatic/GI:     GERD                Neurological:   CVA                                    Endocrine:  Diabetes               Physical Exam  General: Cooperative    Airway:  Mallampati: I   Mouth Opening: Normal  TM Distance: Normal  Tongue: Normal  Neck ROM: Normal ROM    Dental:  Intact    Anesthesia Plan  Type of Anesthesia, risks & benefits discussed:    Anesthesia Type: Gen Natural Airway  Intra-op Monitoring Plan: Standard ASA Monitors  Post Op Pain Control Plan: multimodal analgesia  Induction:  IV  Informed Consent: Informed consent signed with the Patient and all parties understand the risks and agree with anesthesia plan.  All questions answered.   ASA Score: 3  Day of Surgery Review of History & Physical: H&P Update referred to the surgeon/provider.    Ready For Surgery From Anesthesia Perspective.   .             [1]  Patient Active Problem List  Diagnosis    Morbid (severe) obesity due to excess calories    PADDY (obstructive sleep apnea)    Type 2 diabetes mellitus, without long-term current use of insulin    Hyperlipemia    Proteinuria    Bilateral knee pain    DJD (degenerative joint disease) of knee    Debility    Prosthetic joint implant failure    Failed total right knee replacement    Right knee pain    History of total left knee replacement    Lumbago    Chronic, continuous use of opioids    Mild intermittent asthma without complication    Allergic reaction to food    DDD (degenerative disc disease), cervical     Cervical radiculopathy    Cervical spondylosis    Bilateral shoulder bursitis    Cervical stenosis of spinal canal    DDD (degenerative disc disease), thoracic    Thoracic spondylosis    Spondylolisthesis at L4-L5 level    Lumbar spondylosis    Spondylolisthesis of cervical region    Cervical spine instability    Myeloradiculopathy    Neural foraminal stenosis of cervical spine    DDD (degenerative disc disease), lumbar    Idiopathic scoliosis of thoracolumbar spine    Bilateral shoulder region arthritis    Trochanteric bursitis of both hips    Chronic pain    Anxiety    GERD (gastroesophageal reflux disease)    Sacroiliac joint pain    Spondylosis without myelopathy    Subacromial bursitis of left shoulder joint    PADDY non-compliant with CPAP    BMI 40.0-44.9, adult    Gout    History of sleeve gastrectomy    History of total right knee replacement    Noncompliance with CPAP treatment    Osteoarthritis of lumbar spine    Aortic atherosclerosis    Uterine fibroid    Impaired range of motion of both knees    Decreased strength of lower extremity    Impaired functional mobility, balance, gait, and endurance    Paroxysmal atrial fibrillation    History of right MCA stroke    Osteoarthritis of multiple joints    Diabetic polyneuropathy associated with type 2 diabetes mellitus    Allergies    History of COVID-19    History of MRSA infection    Postprocedural hypotension    Hypotension    Impaired ambulation    Lumbosacral radiculopathy    Recurrent major depressive disorder, in partial remission   [2]  Current Facility-Administered Medications on File Prior to Encounter   Medication Dose Route Frequency Provider Last Rate Last Admin    0.9%  NaCl infusion   Intravenous Continuous Lina Wagner MD 25 mL/hr at 12/15/20 1506 25 mL/hr at 12/15/20 1506    0.9%  NaCl infusion   Intravenous Continuous Ciro Chung MD        0.9%  NaCl infusion   Intravenous Continuous  Santos Barron MD         Current Outpatient Medications on File Prior to Encounter   Medication Sig Dispense Refill    acetaminophen (TYLENOL) 650 MG TbSR Take 1 tablet (650 mg total) by mouth every 8 (eight) hours as needed (pain). 90 tablet 1    albuterol (PROVENTIL/VENTOLIN HFA) 90 mcg/actuation inhaler INHALE TWO PUFFS INTO LUNGS EVERY 4 TO 6 HOURS AS NEEDED FOR SHORTNESS OF BREATH AND FOR WHEEZING 54 g 3    allopurinoL (ZYLOPRIM) 300 MG tablet TAKE 1 TABLET (300 MG TOTAL) BY MOUTH 2 (TWO) TIMES DAILY. 180 tablet 1    atorvastatin (LIPITOR) 80 MG tablet Take 1 tablet (80 mg total) by mouth once daily. 90 tablet 1    azithromycin (Z-MICKI) 250 MG tablet follow package directions 6 tablet 0    b complex vitamins tablet Take 1 tablet by mouth every other day.       blood pressure monitor (BLOOD PRESSURE KIT) Kit 1 kit by Misc.(Non-Drug; Combo Route) route Daily. Check blood pressure daily 1 each 0    calcium citrate/vitamin D2 (ISIAH-CITRATE ORAL) Take 500 mg by mouth once daily.      colchicine (MITIGARE) 0.6 mg Cap One daily as needed for gout flare 90 capsule 1    cyanocobalamin (VITAMIN B-12) 1000 MCG tablet Take 100 mcg by mouth every morning.      fluticasone propionate (FLONASE) 50 mcg/actuation nasal spray 1 SPRAY BY EACH NOSTRIL ROUTE 2 TIMES DAILY AS NEEDED FOR RHINITIS. 48 g 3    LIDOcaine (XYLOCAINE) 5 % Oint ointment APPLY TOPICALLY AS NEEDED. 35.44 g 6    LIDOcaine-prilocaine (EMLA) cream APPLY TOPICALLY AS NEEDED. FOR FOOT PAIN 30 g 2    MULTIVIT-IRON-MIN-FOLIC ACID 3,500-18-0.4 UNIT-MG-MG ORAL CHEW Take 1 tablet by mouth 2 (two) times daily.       nystatin (MYCOSTATIN) powder Apply topically 2 (two) times daily. 60 g 3    omeprazole (PRILOSEC) 40 MG capsule Take 1 capsule (40 mg total) by mouth once daily. 90 capsule 3    oxybutynin (DITROPAN-XL) 5 MG TR24 Take 1 tablet (5 mg total) by mouth once daily. 90 tablet 3    prednisoLONE acetate (PRED FORTE) 1 % DrpS Place 1 drop into the  right eye 3 (three) times daily.      urea (CARMOL) 40 % Crea Apply topically once daily. To dry skin on the feet. 120 g 5    vitamin D 185 MG Tab Take 5,000 mg by mouth every morning.       apixaban (ELIQUIS) 5 mg Tab Take 1 tablet (5 mg total) by mouth 2 (two) times daily. 60 tablet 6    FLUoxetine 40 MG capsule Take 1 capsule (40 mg total) by mouth once daily. 90 capsule 3    naloxone (NARCAN) 4 mg/actuation Spry 4mg by nasal route as needed for opioid overdose; may repeat every 2-3 minutes in alternating nostrils until medical help arrives. Call 911 1 each 11    semaglutide (OZEMPIC) 2 mg/dose (8 mg/3 mL) PnIj Inject 2 mg into the skin every 7 days. 3 mL 11    senna-docusate 8.6-50 mg (SENNA WITH DOCUSATE SODIUM) 8.6-50 mg per tablet Take 1 tablet by mouth once daily. 30 tablet 0

## 2025-04-14 NOTE — PROVATION PATIENT INSTRUCTIONS
Discharge Summary/Instructions after an Endoscopic Procedure  Patient Name: Alivia Thomas  Patient MRN: [id number]  Patient YOB: 1965 Monday, April 14, 2025  Charlotte Cooney MD  Dear patient,  As a result of recent federal legislation (The Federal Cures Act), you may   receive lab or pathology results from your procedure in your [facility]   account before your physician is able to contact you. Your physician or   their representative will relay the results to you with their   recommendations at their soonest availability.  Thank you,  RESTRICTIONS:  During your procedure today, you received medications for sedation.  These   medications may affect your judgment, balance and coordination.  Therefore,   for 24 hours, you have the following restrictions:   - DO NOT drive a car, operate machinery, make legal/financial decisions,   sign important papers or drink alcohol.    ACTIVITY:  Today: no heavy lifting, straining or running due to procedural   sedation/anesthesia.  The following day: return to full activity including work.  DIET:  Eat and drink normally unless instructed otherwise.     TREATMENT FOR COMMON SIDE EFFECTS:  - Mild abdominal pain, nausea, belching, bloating or excessive gas:  rest,   eat lightly and use a heating pad.  - Sore Throat: treat with throat lozenges and/or gargle with warm salt   water.  - Because air was used during the procedure, expelling large amounts of air   from your rectum or belching is normal.  - If a bowel prep was taken, you may not have a bowel movement for 1-3 days.    This is normal.  SYMPTOMS TO WATCH FOR AND REPORT TO YOUR PHYSICIAN:  1. Abdominal pain or bloating, other than gas cramps.  2. Chest pain.  3. Back pain.  4. Signs of infection such as: chills or fever occurring within 24 hours   after the procedure.  5. Rectal bleeding, which would show as bright red, maroon, or black stools.   (A tablespoon of blood from the rectum is not serious, especially if    hemorrhoids are present.)  6. Vomiting.  7. Weakness or dizziness.  GO DIRECTLY TO THE NEAREST EMERGENCY ROOM IF YOU HAVE ANY OF THE FOLLOWING:      Difficulty breathing              Chills and/or fever over 101 F   Persistent vomiting and/or vomiting blood   Severe abdominal pain   Severe chest pain   Black, tarry stools   Bleeding- more than one tablespoon   Any other symptom or condition that you feel may need urgent attention  Your doctor recommends these additional instructions:  If any biopsies were taken, your doctors clinic will contact you in 1 to 2   weeks with any results.  - Resume previous diet.   - Continue present medications.   - Resume Eliquis (apixaban) at prior dose today.   - Repeat colonoscopy in 10 years for screening purposes.   - Discharge patient to home (with escort).   - The patient will be observed post-procedure, until all discharge criteria   are met.   - Patient has a contact number available for emergencies.  The signs and   symptoms of potential delayed complications were discussed with the   patient.  Return to normal activities tomorrow.  Written discharge   instructions were provided to the patient.  For questions, problems or results please call your physician - Charlotte Cooney MD at Work:  [phone].  [facility], EMERGENCY ROOM PHONE NUMBER: [phone]  IF A COMPLICATION OR EMERGENCY SITUATION ARISES AND YOU ARE UNABLE TO REACH   YOUR PHYSICIAN - GO DIRECTLY TO THE EMERGENCY ROOM.  MD Charlotte Nguyen MD  4/14/2025 1:40:17 PM  This report has been verified and signed electronically.  Dear patient,  As a result of recent federal legislation (The Federal Cures Act), you may   receive lab or pathology results from your procedure in your [facility]   account before your physician is able to contact you. Your physician or   their representative will relay the results to you with their   recommendations at their soonest availability.  Thank  you,  PROVATION

## 2025-04-14 NOTE — TRANSFER OF CARE
"Anesthesia Transfer of Care Note    Patient: Alivia Thomas    Procedure(s) Performed: Procedure(s) (LRB):  COLONOSCOPY, SCREENING, LOW RISK PATIENT (N/A)  EGD (ESOPHAGOGASTRODUODENOSCOPY) (N/A)    Patient location: PACU    Anesthesia Type: general    Transport from OR: Transported from OR on room air with adequate spontaneous ventilation    Post pain: adequate analgesia    Post assessment: no apparent anesthetic complications and tolerated procedure well    Post vital signs: stable    Level of consciousness: awake and alert    Nausea/Vomiting: no nausea/vomiting    Complications: none    Transfer of care protocol was followed      Last vitals: Visit Vitals  /79   Pulse 65   Temp 36.5 °C (97.7 °F)   Resp 15   Ht 5' 5" (1.651 m)   Wt 109.3 kg (241 lb)   LMP 06/26/2018   SpO2 98%   Breastfeeding No   BMI 40.10 kg/m²     "

## 2025-04-14 NOTE — PROVATION PATIENT INSTRUCTIONS
Discharge Summary/Instructions after an Endoscopic Procedure  Patient Name: Alivia Thomas  Patient MRN: [id number]  Patient YOB: 1965 Monday, April 14, 2025  Charlotte Cooney MD  Dear patient,  As a result of recent federal legislation (The Federal Cures Act), you may   receive lab or pathology results from your procedure in your [facility]   account before your physician is able to contact you. Your physician or   their representative will relay the results to you with their   recommendations at their soonest availability.  Thank you,  RESTRICTIONS:  During your procedure today, you received medications for sedation.  These   medications may affect your judgment, balance and coordination.  Therefore,   for 24 hours, you have the following restrictions:   - DO NOT drive a car, operate machinery, make legal/financial decisions,   sign important papers or drink alcohol.    ACTIVITY:  Today: no heavy lifting, straining or running due to procedural   sedation/anesthesia.  The following day: return to full activity including work.  DIET:  Eat and drink normally unless instructed otherwise.     TREATMENT FOR COMMON SIDE EFFECTS:  - Mild abdominal pain, nausea, belching, bloating or excessive gas:  rest,   eat lightly and use a heating pad.  - Sore Throat: treat with throat lozenges and/or gargle with warm salt   water.  - Because air was used during the procedure, expelling large amounts of air   from your rectum or belching is normal.  - If a bowel prep was taken, you may not have a bowel movement for 1-3 days.    This is normal.  SYMPTOMS TO WATCH FOR AND REPORT TO YOUR PHYSICIAN:  1. Abdominal pain or bloating, other than gas cramps.  2. Chest pain.  3. Back pain.  4. Signs of infection such as: chills or fever occurring within 24 hours   after the procedure.  5. Rectal bleeding, which would show as bright red, maroon, or black stools.   (A tablespoon of blood from the rectum is not serious, especially if    hemorrhoids are present.)  6. Vomiting.  7. Weakness or dizziness.  GO DIRECTLY TO THE NEAREST EMERGENCY ROOM IF YOU HAVE ANY OF THE FOLLOWING:      Difficulty breathing              Chills and/or fever over 101 F   Persistent vomiting and/or vomiting blood   Severe abdominal pain   Severe chest pain   Black, tarry stools   Bleeding- more than one tablespoon   Any other symptom or condition that you feel may need urgent attention  Your doctor recommends these additional instructions:  If any biopsies were taken, your doctors clinic will contact you in 1 to 2   weeks with any results.  - Perform a colonoscopy today.   - Resume previous diet.   - Continue present medications.   - Discharge patient to home (with escort).   - The patient will be observed post-procedure, until all discharge criteria   are met.   - Patient has a contact number available for emergencies.  The signs and   symptoms of potential delayed complications were discussed with the   patient.  Return to normal activities tomorrow.  Written discharge   instructions were provided to the patient.  For questions, problems or results please call your physician - Charlotte Cooney MD at Work:  [phone].  [facility], EMERGENCY ROOM PHONE NUMBER: [phone]  IF A COMPLICATION OR EMERGENCY SITUATION ARISES AND YOU ARE UNABLE TO REACH   YOUR PHYSICIAN - GO DIRECTLY TO THE EMERGENCY ROOM.  MD Charlotte Nguyen MD  4/14/2025 1:13:44 PM  This report has been verified and signed electronically.  Dear patient,  As a result of recent federal legislation (The Federal Cures Act), you may   receive lab or pathology results from your procedure in your [facility]   account before your physician is able to contact you. Your physician or   their representative will relay the results to you with their   recommendations at their soonest availability.  Thank you,  PROVATION

## 2025-04-14 NOTE — H&P
Short Stay Endoscopy History and Physical    PCP - Clarisse Richardson MD    Procedure - EGD/Colonoscopy  ASA - per anesthesia  Mallampati - per anesthesia  History of Anesthesia problems - no  Family history Anesthesia problems -  no   Plan of anesthesia - MAC    HPI:  This is a 59 y.o. female here for evaluation of :     Denies N/V, dysphagia, odynophagia, abdominal pain, diarrhea, gross GI bleeding.     EGD - GERD  Colon - CRC screening    Last EGD 2019 small hiatal hernia, otherwise normal  Last colonoscopy 2020 with 3mm polyp    Takes Eliquis. Last dose 4/4/25.     Last Ozempic 4/5/25.    ROS:  Constitutional: No fevers, chills, No weight loss  CV: No chest pain  Pulm: No cough, No shortness of breath  Ophtho: No vision changes  GI: see HPI  Derm: No rash    Medical History:  has a past medical history of Acute right MCA stroke (01/04/2024), Allergy, Anemia, Asthma, Bilateral shoulder bursitis, Cervical stenosis of spine, Chronic pain, DDD (degenerative disc disease), cervical, Depression (01/28/2019), Diabetes mellitus type II, DJD (degenerative joint disease) of knee (06/19/2014), Facet arthritis of lumbar region (12/17/2015), Facet syndrome (12/17/2015), GERD (gastroesophageal reflux disease), Heartburn, Hyperlipidemia, Hypertension, Morbid obesity, Neuromuscular disorder, PADDY (obstructive sleep apnea), Paroxysmal atrial fibrillation (03/19/2024), Proteinuria, Right carpal tunnel syndrome, Sacroiliitis (06/13/2018), Sacroiliitis, Sleep apnea, and Uterine fibroid.    Surgical History:  has a past surgical history that includes Carpal tunnel release; Carpal tunnel release (1980s); Carpal tunnel release (2012); Knee surgery (03/2010); Knee surgery (06/19/2014); Radiofrequency ablation of lumbar medial branch nerve at single level (Left, 06/26/2018); Radiofrequency ablation of lumbar medial branch nerve at single level (Right, 07/10/2018); Esophagogastroduodenoscopy (N/A, 03/27/2019); Radiofrequency  ablation (Right, 07/09/2019); Joint replacement (Bilateral); Trigger finger release (Right, 2017); Trigger finger release (Left, 07/29/2019); Laparoscopic sleeve gastrectomy (N/A, 08/28/2019); Radiofrequency ablation (Left, 12/19/2019); Radiofrequency ablation (Right, 12/31/2019); Injection of joint (Bilateral, 06/09/2020); Colonoscopy (N/A, 06/15/2020); Radiofrequency ablation (Right, 10/13/2020); Radiofrequency ablation (Left, 11/03/2020); Radiofrequency ablation (Left, 12/15/2020); Radiofrequency ablation (Right, 12/29/2020); Injection of joint (Bilateral, 05/11/2021); Radiofrequency ablation (Left, 06/29/2021); Radiofrequency ablation (Right, 07/13/2021); Radiofrequency ablation (Right, 08/24/2021); Radiofrequency ablation (Left, 09/11/2021); Radiofrequency ablation (Right, 03/07/2022); Radiofrequency ablation (Left, 03/21/2022); Radiofrequency ablation (Right, 05/09/2022); Radiofrequency ablation (Left, 05/23/2022); Panniculectomy (N/A, 06/14/2022); Radiofrequency ablation (Right, 12/12/2022); Radiofrequency ablation (Left, 01/09/2023); Radiofrequency ablation (Right, 03/06/2023); Radiofrequency ablation (Left, 05/22/2023); Epidural steroid injection (N/A, 07/24/2023); Cataract extraction w/  intraocular lens implant (Left, 08/08/2023); Cataract extraction w/  intraocular lens implant (Right, 08/29/2023); Radiofrequency ablation (Right, 11/27/2023); Radiofrequency ablation (Right, 01/22/2024); Radiofrequency ablation (Left, 02/12/2024); Injection of joint (Bilateral, 5/16/2024); Revision of knee arthroplasty (Right, 7/18/2024); and Transforaminal epidural injection of steroid (N/A, 1/13/2025).    Family History: family history includes Cancer in her sister; Cataracts in her father and mother; Coronary artery disease in her brother; Diabetes in her brother, brother, father, mother, and sister; Hypertension in her sister; Hypotension in her brother and brother; Kidney failure in her brother; No Known Problems in  "her sister.. Otherwise no colon cancer, inflammatory bowel disease, or GI malignancies.    Social History:  reports that she has never smoked. She has never been exposed to tobacco smoke. She has never used smokeless tobacco. She reports that she does not currently use alcohol. She reports that she does not use drugs.    Review of patient's allergies indicates:   Allergen Reactions    Strawberry Anaphylaxis       Medications:   Prescriptions Prior to Admission[1]    Physical Exam:    Vital Signs:   Vitals:    04/14/25 1229   BP: 123/79   Pulse: 65   Resp: 15   Temp: 97.7 °F (36.5 °C)   Body mass index is 40.1 kg/m².    General Appearance: Well appearing in no acute distress  Eyes:    No scleral icterus  Lungs: Symmetric chest excursions  Heart:  Regular rate  Abdomen: Soft, non tender, non distended  Extremities: No edema  Skin: No rash    Labs:  Lab Results   Component Value Date    WBC 4.93 02/24/2025    HGB 12.2 02/24/2025    HCT 38.0 02/24/2025     02/24/2025    CHOL 149 02/24/2025    TRIG 41 02/24/2025    HDL 68 02/24/2025    ALT 18 02/24/2025    AST 45 (H) 02/24/2025     02/24/2025    K 5.0 02/24/2025     02/24/2025    CREATININE 0.7 02/24/2025    BUN 9 02/24/2025    CO2 24 02/24/2025    TSH 0.962 02/24/2025    INR 1.1 08/04/2024    HGBA1C 6.5 (H) 02/24/2025       We discussed the risks of EGD, including bleeding, infections, and perforation requiring surgery.    We discussed the risks of colonoscopy, including bleeding, infections, and perforation requiring surgery. We discussed the there is no "perfect colonoscopy," and that sometimes they cannot be completed and that, though we are careful, sometimes lesions are missed. We noted that, while colonoscopy should reduce the risk of colon cancer, that risk is not zero.     The patient was consented for procedures. Plan to proceed with EGD and colonoscopy. All questions were answered.    Charlotte Cooney MD         [1]   Medications Prior to " Admission   Medication Sig Dispense Refill Last Dose/Taking    acetaminophen (TYLENOL) 650 MG TbSR Take 1 tablet (650 mg total) by mouth every 8 (eight) hours as needed (pain). 90 tablet 1 Past Week    albuterol (PROVENTIL/VENTOLIN HFA) 90 mcg/actuation inhaler INHALE TWO PUFFS INTO LUNGS EVERY 4 TO 6 HOURS AS NEEDED FOR SHORTNESS OF BREATH AND FOR WHEEZING 54 g 3 Past Month    allopurinoL (ZYLOPRIM) 300 MG tablet TAKE 1 TABLET (300 MG TOTAL) BY MOUTH 2 (TWO) TIMES DAILY. 180 tablet 1 Past Week    atorvastatin (LIPITOR) 80 MG tablet Take 1 tablet (80 mg total) by mouth once daily. 90 tablet 1 Past Week    azithromycin (Z-MICKI) 250 MG tablet follow package directions 6 tablet 0 Past Month    blood pressure monitor (BLOOD PRESSURE KIT) Kit 1 kit by Misc.(Non-Drug; Combo Route) route Daily. Check blood pressure daily 1 each 0 Past Week    colchicine (MITIGARE) 0.6 mg Cap One daily as needed for gout flare 90 capsule 1 Past Week    fluticasone propionate (FLONASE) 50 mcg/actuation nasal spray 1 SPRAY BY EACH NOSTRIL ROUTE 2 TIMES DAILY AS NEEDED FOR RHINITIS. 48 g 3 Past Week    LIDOcaine (XYLOCAINE) 5 % Oint ointment APPLY TOPICALLY AS NEEDED. 35.44 g 6 Past Week    LIDOcaine-prilocaine (EMLA) cream APPLY TOPICALLY AS NEEDED. FOR FOOT PAIN 30 g 2 Past Week    nystatin (MYCOSTATIN) powder Apply topically 2 (two) times daily. 60 g 3 Past Week    omeprazole (PRILOSEC) 40 MG capsule Take 1 capsule (40 mg total) by mouth once daily. 90 capsule 3 Past Week    oxybutynin (DITROPAN-XL) 5 MG TR24 Take 1 tablet (5 mg total) by mouth once daily. 90 tablet 3 Past Week    oxyCODONE-acetaminophen (PERCOCET)  mg per tablet Take 1 tablet by mouth every 6 to 8 hours as needed for Pain (for severe pain). 105 tablet 0 Past Week    prednisoLONE acetate (PRED FORTE) 1 % DrpS Place 1 drop into the right eye 3 (three) times daily.   Past Week    urea (CARMOL) 40 % Crea Apply topically once daily. To dry skin on the feet. 120 g 5 Past  Week    apixaban (ELIQUIS) 5 mg Tab Take 1 tablet (5 mg total) by mouth 2 (two) times daily. 60 tablet 6 4/4/2025    b complex vitamins tablet Take 1 tablet by mouth every other day.    Past Week    calcium citrate/vitamin D2 (ISIAH-CITRATE ORAL) Take 500 mg by mouth once daily.   Past Week    cyanocobalamin (VITAMIN B-12) 1000 MCG tablet Take 100 mcg by mouth every morning.   Past Week    FLUoxetine 40 MG capsule Take 1 capsule (40 mg total) by mouth once daily. 90 capsule 3     MULTIVIT-IRON-MIN-FOLIC ACID 3,500-18-0.4 UNIT-MG-MG ORAL CHEW Take 1 tablet by mouth 2 (two) times daily.    Past Week    naloxone (NARCAN) 4 mg/actuation Spry 4mg by nasal route as needed for opioid overdose; may repeat every 2-3 minutes in alternating nostrils until medical help arrives. Call 911 1 each 11 More than a month    semaglutide (OZEMPIC) 2 mg/dose (8 mg/3 mL) PnIj Inject 2 mg into the skin every 7 days. 3 mL 11 4/5/2025    senna-docusate 8.6-50 mg (SENNA WITH DOCUSATE SODIUM) 8.6-50 mg per tablet Take 1 tablet by mouth once daily. 30 tablet 0     vitamin D 185 MG Tab Take 5,000 mg by mouth every morning.    Past Week

## 2025-04-28 ENCOUNTER — OFFICE VISIT (OUTPATIENT)
Dept: PAIN MEDICINE | Facility: CLINIC | Age: 60
End: 2025-04-28
Payer: MEDICARE

## 2025-04-28 ENCOUNTER — PATIENT MESSAGE (OUTPATIENT)
Dept: PAIN MEDICINE | Facility: CLINIC | Age: 60
End: 2025-04-28

## 2025-04-28 DIAGNOSIS — G89.29 CHRONIC PAIN OF BOTH KNEES: ICD-10-CM

## 2025-04-28 DIAGNOSIS — G89.4 CHRONIC PAIN SYNDROME: ICD-10-CM

## 2025-04-28 DIAGNOSIS — M47.816 SPONDYLOSIS OF LUMBAR REGION WITHOUT MYELOPATHY OR RADICULOPATHY: ICD-10-CM

## 2025-04-28 DIAGNOSIS — G89.4 CHRONIC PAIN SYNDROME: Primary | ICD-10-CM

## 2025-04-28 DIAGNOSIS — M17.0 PRIMARY OSTEOARTHRITIS OF BOTH KNEES: ICD-10-CM

## 2025-04-28 DIAGNOSIS — M46.1 SACROILIITIS: ICD-10-CM

## 2025-04-28 DIAGNOSIS — Z96.651 S/P REVISION OF TOTAL KNEE, RIGHT: ICD-10-CM

## 2025-04-28 DIAGNOSIS — Z51.81 ENCOUNTER FOR MONITORING OPIOID MAINTENANCE THERAPY: ICD-10-CM

## 2025-04-28 DIAGNOSIS — M51.369 DDD (DEGENERATIVE DISC DISEASE), LUMBAR: ICD-10-CM

## 2025-04-28 DIAGNOSIS — M51.360 DEGENERATION OF INTERVERTEBRAL DISC OF LUMBAR REGION WITH DISCOGENIC BACK PAIN: ICD-10-CM

## 2025-04-28 DIAGNOSIS — M47.816 LUMBAR SPONDYLOSIS: ICD-10-CM

## 2025-04-28 DIAGNOSIS — M25.562 CHRONIC PAIN OF BOTH KNEES: ICD-10-CM

## 2025-04-28 DIAGNOSIS — Z79.891 ENCOUNTER FOR MONITORING OPIOID MAINTENANCE THERAPY: ICD-10-CM

## 2025-04-28 DIAGNOSIS — M25.561 CHRONIC PAIN OF BOTH KNEES: ICD-10-CM

## 2025-04-28 PROCEDURE — 98004 SYNCH AUDIO-VIDEO EST SF 10: CPT | Mod: 95,,, | Performed by: NURSE PRACTITIONER

## 2025-04-28 RX ORDER — DEXTROMETHORPHAN HYDROBROMIDE, GUAIFENESIN 5; 100 MG/5ML; MG/5ML
650 LIQUID ORAL EVERY 8 HOURS PRN
Qty: 90 TABLET | Refills: 1 | Status: SHIPPED | OUTPATIENT
Start: 2025-04-28

## 2025-04-28 RX ORDER — OXYCODONE AND ACETAMINOPHEN 10; 325 MG/1; MG/1
1 TABLET ORAL EVERY 8 HOURS PRN
Qty: 90 TABLET | Refills: 0 | Status: SHIPPED | OUTPATIENT
Start: 2025-05-13

## 2025-04-28 NOTE — TELEPHONE ENCOUNTER
Refill Routing Note   Medication(s) are not appropriate for processing by [facility] for the following reason(s):        Outside of protocol    [facility] action(s):  Route               Appointments  past 12m or future 3m with PCP    Date Provider   Last Visit   2/24/2025 Clarisse Richardson MD   Next Visit   Visit date not found Clarisse Richardson MD   ED visits in past 90 days: 0        Note composed:8:52 AM 04/28/2025

## 2025-05-09 NOTE — TELEPHONE ENCOUNTER
Care Due:                  Date            Visit Type   Department     Provider  --------------------------------------------------------------------------------                                MYCHART                              FOLLOWUP/OF  [facility] INTERNAL  Last Visit: 02-      [facility] VISIT   MEDICINE       Clarisse Richardson  Next Visit: None Scheduled  None         None Found                                                            Last  Test          Frequency    Reason                     Performed    Due Date  --------------------------------------------------------------------------------    Uric Acid...  12 months..  allopurinoL, colchicine..  07- 06-    [facility] Embedded Care Due Messages. Reference number: [id number].   5/09/2025 7:08:20 AM CDT

## 2025-05-09 NOTE — TELEPHONE ENCOUNTER
Refill Routing Note   Medication(s) are not appropriate for processing by [facility] for the following reason(s):        Outside of protocol    [facility] action(s):  Route     Requires labs : Yes             Appointments  past 12m or future 3m with PCP    Date Provider   Last Visit   2/24/2025 Clarisse Richardson MD   Next Visit   Visit date not found Clarisse Richardson MD   ED visits in past 90 days: 0        Note composed:12:11 PM 05/09/2025

## 2025-05-13 ENCOUNTER — PATIENT MESSAGE (OUTPATIENT)
Dept: BARIATRICS | Facility: CLINIC | Age: 60
End: 2025-05-13
Payer: MEDICARE

## 2025-05-20 ENCOUNTER — OFFICE VISIT (OUTPATIENT)
Dept: OPTOMETRY | Facility: CLINIC | Age: 60
End: 2025-05-20
Payer: MEDICARE

## 2025-05-20 DIAGNOSIS — Z96.1 PSEUDOPHAKIA OF BOTH EYES: ICD-10-CM

## 2025-05-20 DIAGNOSIS — E11.9 TYPE 2 DIABETES MELLITUS WITHOUT RETINOPATHY: Primary | ICD-10-CM

## 2025-05-20 DIAGNOSIS — H04.123 BILATERAL DRY EYES: ICD-10-CM

## 2025-05-20 PROCEDURE — 92014 COMPRE OPH EXAM EST PT 1/>: CPT | Mod: S$PBB,,, | Performed by: OPTOMETRIST

## 2025-05-20 PROCEDURE — 99999 PR PBB SHADOW E&M-EST. PATIENT-LVL IV: CPT | Mod: PBBFAC,,, | Performed by: OPTOMETRIST

## 2025-05-20 PROCEDURE — 99214 OFFICE O/P EST MOD 30 MIN: CPT | Mod: PBBFAC,PO | Performed by: OPTOMETRIST

## 2025-05-20 NOTE — PROGRESS NOTES
HPI     Diabetic Eye Exam            Comments: Patient Alivia Shearer is a 59 year old female.          Comments    Pt here for annual diabetic eye exam. Pt states that she has some blurry   distance [place] with day and night glare/driving. Pt states that near [place] is   good in OTC +2.75 readers. Pt states that she has been having RUL redness   but denies any discharge. Pt states that she has been having floaters in   [place] but denies any eye pain.    DLS: 02/20/2024 with Dr. Dodson  PD:    Meds:    POHx:  1. Type 2 diabetes mellitus without retinopathy - Both Eyes  2. Pseudophakia of both eyes    (+)DM  Hemoglobin A1C       Date                     Value               Ref Range             Status                02/24/2025               6.5 (H)             4.0 - 5.6 %           Final                      07/01/2024               6.9 (H)             4.0 - 5.6 %           Final                      01/05/2024               6.2 (H)             4.0 - 5.6 %           Final                        Last edited by Alka Rodriguez on 5/20/2025  1:17 PM.            Assessment /Plan     For exam results, see Encounter Report.    Type 2 diabetes mellitus without retinopathy - Both Eyes    Pseudophakia of both eyes    Bilateral dry eyes      No diabetic retinopathy, no csme. Return in 1 year for dilated eye exam.  2. Monitor condition. Patient to report any changes. RTC 1 year recheck.  3. Causing redness from irritation, Recommend artificial tears. 1 drop 2x per day. Chronicity of disease and treatment discussed.

## 2025-06-02 ENCOUNTER — OFFICE VISIT (OUTPATIENT)
Dept: URGENT CARE | Facility: CLINIC | Age: 60
End: 2025-06-02
Payer: MEDICARE

## 2025-06-02 ENCOUNTER — TELEPHONE (OUTPATIENT)
Dept: OPHTHALMOLOGY | Facility: CLINIC | Age: 60
End: 2025-06-02
Payer: MEDICARE

## 2025-06-02 ENCOUNTER — OFFICE VISIT (OUTPATIENT)
Dept: PODIATRY | Facility: CLINIC | Age: 60
End: 2025-06-02
Payer: MEDICARE

## 2025-06-02 VITALS
HEART RATE: 74 BPM | DIASTOLIC BLOOD PRESSURE: 74 MMHG | SYSTOLIC BLOOD PRESSURE: 116 MMHG | HEIGHT: 65 IN | BODY MASS INDEX: 40.14 KG/M2 | WEIGHT: 240.94 LBS

## 2025-06-02 VITALS
BODY MASS INDEX: 39.99 KG/M2 | RESPIRATION RATE: 18 BRPM | TEMPERATURE: 99 F | OXYGEN SATURATION: 99 % | HEIGHT: 65 IN | WEIGHT: 240 LBS | HEART RATE: 74 BPM | DIASTOLIC BLOOD PRESSURE: 87 MMHG | SYSTOLIC BLOOD PRESSURE: 149 MMHG

## 2025-06-02 DIAGNOSIS — E11.49 TYPE II DIABETES MELLITUS WITH NEUROLOGICAL MANIFESTATIONS: Primary | ICD-10-CM

## 2025-06-02 DIAGNOSIS — B37.9 ANTIBIOTIC-INDUCED YEAST INFECTION: ICD-10-CM

## 2025-06-02 DIAGNOSIS — H10.9 BACTERIAL CONJUNCTIVITIS OF RIGHT EYE: ICD-10-CM

## 2025-06-02 DIAGNOSIS — L03.213 PRESEPTAL CELLULITIS OF RIGHT EYE: Primary | ICD-10-CM

## 2025-06-02 DIAGNOSIS — T36.95XA ANTIBIOTIC-INDUCED YEAST INFECTION: ICD-10-CM

## 2025-06-02 DIAGNOSIS — B35.1 DERMATOPHYTOSIS OF NAIL: ICD-10-CM

## 2025-06-02 DIAGNOSIS — Z79.01 CHRONIC ANTICOAGULATION: ICD-10-CM

## 2025-06-02 PROCEDURE — 11721 DEBRIDE NAIL 6 OR MORE: CPT | Mod: Q9,PBBFAC,PN | Performed by: PODIATRIST

## 2025-06-02 PROCEDURE — 99214 OFFICE O/P EST MOD 30 MIN: CPT | Mod: PBBFAC,PN,25 | Performed by: PODIATRIST

## 2025-06-02 PROCEDURE — 99213 OFFICE O/P EST LOW 20 MIN: CPT | Mod: S$GLB,,, | Performed by: FAMILY MEDICINE

## 2025-06-02 PROCEDURE — 99999 PR PBB SHADOW E&M-EST. PATIENT-LVL IV: CPT | Mod: PBBFAC,,, | Performed by: PODIATRIST

## 2025-06-02 RX ORDER — POLYMYXIN B SULFATE AND TRIMETHOPRIM 1; 10000 MG/ML; [USP'U]/ML
1 SOLUTION OPHTHALMIC EVERY 6 HOURS
Qty: 10 ML | Refills: 0 | Status: SHIPPED | OUTPATIENT
Start: 2025-06-02 | End: 2025-07-02

## 2025-06-02 RX ORDER — FLUCONAZOLE 150 MG/1
TABLET ORAL
Qty: 2 TABLET | Refills: 0 | Status: SHIPPED | OUTPATIENT
Start: 2025-06-02

## 2025-06-02 RX ORDER — CEPHALEXIN 500 MG/1
500 CAPSULE ORAL EVERY 12 HOURS
Qty: 14 CAPSULE | Refills: 0 | Status: SHIPPED | OUTPATIENT
Start: 2025-06-02 | End: 2025-06-09

## 2025-06-03 ENCOUNTER — PATIENT MESSAGE (OUTPATIENT)
Dept: BARIATRICS | Facility: CLINIC | Age: 60
End: 2025-06-03
Payer: MEDICARE

## 2025-06-09 ENCOUNTER — OFFICE VISIT (OUTPATIENT)
Dept: INTERNAL MEDICINE | Facility: CLINIC | Age: 60
End: 2025-06-09
Payer: MEDICARE

## 2025-06-09 ENCOUNTER — TELEPHONE (OUTPATIENT)
Dept: INTERNAL MEDICINE | Facility: CLINIC | Age: 60
End: 2025-06-09
Payer: MEDICARE

## 2025-06-09 ENCOUNTER — NURSE TRIAGE (OUTPATIENT)
Dept: ADMINISTRATIVE | Facility: CLINIC | Age: 60
End: 2025-06-09
Payer: MEDICARE

## 2025-06-09 VITALS
TEMPERATURE: 98 F | BODY MASS INDEX: 40.77 KG/M2 | RESPIRATION RATE: 12 BRPM | HEART RATE: 79 BPM | DIASTOLIC BLOOD PRESSURE: 76 MMHG | OXYGEN SATURATION: 98 % | SYSTOLIC BLOOD PRESSURE: 124 MMHG | WEIGHT: 244.69 LBS | HEIGHT: 65 IN

## 2025-06-09 DIAGNOSIS — T36.95XA ANTIBIOTIC-INDUCED YEAST INFECTION: ICD-10-CM

## 2025-06-09 DIAGNOSIS — H10.501 BLEPHAROCONJUNCTIVITIS OF RIGHT EYE, UNSPECIFIED BLEPHAROCONJUNCTIVITIS TYPE: Primary | ICD-10-CM

## 2025-06-09 DIAGNOSIS — L03.213 PRESEPTAL CELLULITIS OF RIGHT EYE: ICD-10-CM

## 2025-06-09 DIAGNOSIS — B37.9 ANTIBIOTIC-INDUCED YEAST INFECTION: ICD-10-CM

## 2025-06-09 PROCEDURE — 99214 OFFICE O/P EST MOD 30 MIN: CPT | Mod: PBBFAC,PO

## 2025-06-09 PROCEDURE — 99215 OFFICE O/P EST HI 40 MIN: CPT | Mod: S$PBB,,,

## 2025-06-09 PROCEDURE — 99999 PR PBB SHADOW E&M-EST. PATIENT-LVL IV: CPT | Mod: PBBFAC,,,

## 2025-06-09 RX ORDER — OFLOXACIN 3 MG/ML
1 SOLUTION/ DROPS OPHTHALMIC 4 TIMES DAILY
Qty: 10 ML | Refills: 0 | Status: SHIPPED | OUTPATIENT
Start: 2025-06-09 | End: 2025-07-04

## 2025-06-09 RX ORDER — CEPHALEXIN 500 MG/1
500 CAPSULE ORAL EVERY 12 HOURS
Qty: 6 CAPSULE | Refills: 0 | Status: SHIPPED | OUTPATIENT
Start: 2025-06-09 | End: 2025-06-09

## 2025-06-09 RX ORDER — AMOXICILLIN AND CLAVULANATE POTASSIUM 875; 125 MG/1; MG/1
1 TABLET, FILM COATED ORAL EVERY 12 HOURS
Qty: 14 TABLET | Refills: 0 | Status: SHIPPED | OUTPATIENT
Start: 2025-06-09 | End: 2025-06-16

## 2025-06-09 RX ORDER — FLUCONAZOLE 150 MG/1
TABLET ORAL
Qty: 2 TABLET | Refills: 0 | Status: SHIPPED | OUTPATIENT
Start: 2025-06-09

## 2025-06-09 NOTE — TELEPHONE ENCOUNTER
Reason for Disposition   [1] SEVERE eyelid swelling on one side AND [2] red and painful (or tender to touch)    Additional Information   Negative: Unresponsive, passed out or very weak   Negative: Difficulty breathing or wheezing   Negative: [1] Difficulty swallowing or slurred speech AND [2] sudden onset   Negative: Sounds like a life-threatening emergency to the triager   Negative: [1] SEVERE eyelid swelling (e.g., eyelids swollen shut or almost shut) AND [2] fever   Negative: [1] Eyelid (outer) is very red AND [2] fever   Negative: Patient sounds very sick or weak to the triager   Negative: [1] Pregnant 20 or more weeks AND [2] sudden weight gain (e.g., more than 3 lbs or 1.4 kg in one week)   Negative: [1] Postpartum (from 0 to 6 weeks after delivery) AND [2] sudden weight gain (e.g., more than 3 lbs or 1.4 kg in one week)   Negative: [1] SEVERE eyelid swelling (e.g., eyelids swollen shut or almost shut) AND [2] involves both eyes  (Exception: Itchy eyes, which  are probably an allergic reaction.)   Negative: [1] SEVERE eyelid swelling AND [2] Taking an ACE Inhibitor medicine (e.g., benazepril / LOTENSIN, captopril / CAPOTEN, enalapril / VASOTEC, lisinopril / ZESTRIL)    Protocols used: Eyelid Swelling-A-  Pt states she was seen in Urgent Care last week for right eye pain and swelling. States she was told to see an eye Specialist if she does not improve. States she has pain like a headache when she blinks her eye and her eyelid is still swollen. Advised to see a HCP within 4 hrs. Instructed to see IM if a Specialist cannot see her within 4 hrs and to call [facility] back if new/worsening symptoms develop. Pt verbalized understanding. Warm transferred to Marcela in scheduling.

## 2025-06-09 NOTE — TELEPHONE ENCOUNTER
Copied from CRM #[id number]. Topic: General Inquiry - Patient Advice  >> Jun 9, 2025  9:00 AM Rizwana wrote:  Patient is returning a phone call.    Who left a message for the patient:   Manpreet Jacobson     Does patient know what this is regarding:      Would you like a call back, or a response through your [facility] portal?:       Comments:

## 2025-06-09 NOTE — TELEPHONE ENCOUNTER
Spoke to patient. Pt was seen at Urgent Care. Needs a referral for Eye specialist for swollen eyelid and headaches when she blinks her eye.

## 2025-06-09 NOTE — PROGRESS NOTES
[facility] Note    Subjective:       Patient ID: Alivia Thomas is a 59 y.o. female.    Chief Complaint: Belepharitis (Right/) and Eye Pain    Eye Pain   Associated symptoms include eye redness.       History of Present Illness    CHIEF COMPLAINT:  Ms. Thomas presents today with worsening eye symptoms despite prior urgent care treatment.    HISTORY OF PRESENT ILLNESS:  She reports eye symptoms that began Saturday with a sensation of dust particles and itchiness. The eye became puffy, initially resembling a stye. By Monday, the eye had become significantly red with discharge. Current symptoms include pain when blinking, associated headache, and slightly blurry vision when the eye is watery. She denies light sensitivity. The initial mackenzie sensation has resolved. She initially tried OTC ointment, then visited urgent care where Keflex and eye drops were prescribed. She has been using warm compresses as instructed.    SURGICAL HISTORY:  She has history of corneal implants from cataract surgery performed by Dr. Justin Longo.    SOCIAL HISTORY:  She denies wearing contact lenses or eye makeup.      ROS:  Eyes: +eye pain, +eye discharge, +eye itchiness, +eye swelling, +eye redness  Neurological: +headache           Objective:      Physical Exam  Eyes:      General: Vision grossly intact.         Right eye: Hordeolum present.      Extraocular Movements: Extraocular movements intact.      Conjunctiva/sclera:      Right eye: Right conjunctiva is injected. No exudate or hemorrhage.        Physical Exam            Assessment:       1. Blepharoconjunctivitis of right eye, unspecified blepharoconjunctivitis type  - ofloxacin (OCUFLOX) 0.3 % ophthalmic solution; Place 1 drop into both eyes 4 (four) times daily. for 5 days  Dispense: 10 mL; Refill: 0  - amoxicillin-clavulanate 875-125mg (AUGMENTIN) 875-125 mg per tablet; Take 1 tablet by mouth every 12 (twelve) hours. for 7 days  Dispense: 14 tablet; Refill:  0  - CBC Auto Differential; Future    2. Antibiotic-induced yeast infection  - fluconazole (DIFLUCAN) 150 MG Tab; Take 1 tablet by mouth now. May repeat dose after 72 hours for max efficacy.  Dispense: 2 tablet; Refill: 0    3. Preseptal cellulitis of right eye with underlying stye      Plan:       Assessment & Plan    IMPRESSION:  1. Diagnosed bacterial conjunctivitis with associated stye.  2. Previous treatment with cephalexin and polymyxin B eye drops ineffective, with possible allergic reaction causing increased swelling.  3. Corneal implants from previous cataract surgery.    PLAN SUMMARY:   Apply warm compresses to stye area to draw out bacteria   Clean affected eye area gently with baby shampoo daily or more frequently   Prescribed ofloxacin eye drops, 1 drop 4 times daily for 5 days   Prescribed diflucan as precautionary measure for potential yeast infection   Use cool compresses to reduce overall eye swelling    ACUTE CONJUNCTIVITIS (RIGHT EYE):   Monitored patient's symptoms including itchiness, mackenzie sensation, redness, swelling, and discharge.   Eye appears mostly white but slightly red, with pain upon blinking causing headaches, though light sensitivity is absent.   Diagnosed bacterial conjunctivitis.   Previous treatment with cephalexin and polymyxin B eye drops worsened the condition.   Prescribed ofloxacin eye drops, 1 drop 4 times daily for 5 days to address the persistent infection.   Explained the contagious nature of the condition and advised against touching the eye and then other surfaces.   Instructed patient to clean the affected eye area gently with baby shampoo daily or more frequently as needed.   Recommend cool compresses to reduce overall eye swelling.    HORDEOLUM (STYE) OF RIGHT EYE:   Monitored stye at the top of the eye, which initially presented with sensation of dust particles and itchiness.   Stye is currently drying up and healing, though resolution is slow.   Instructed on  proper technique for preparing and applying warm compresses to the stye area to draw out bacteria.   Advised using baby shampoo along with warm compresses for treatment.    CANDIDIASIS PREVENTION:   Prescribed diflucan as a precautionary measure for potential yeast infection.    PRESENCE OF INTRAOCULAR LENS:   Noted that the patient has corneal implants from cataract surgery performed by Dr. Justin Longo.         -Continue current medications and maintain follow up with specialists.  Return to clinic in Follow up in about 3 days (around 6/12/2025), or if symptoms worsen or fail to improve.     Kristyn Sanchez, MSN, APRN, FNP-C  [facility]    This note was generated with the assistance of ambient listening technology. Verbal consent was obtained by the patient and accompanying visitor(s) for the recording of patient appointment to facilitate this note. I attest to having reviewed and edited the generated note for accuracy, though some syntax or spelling errors may persist. Please contact the author of this note for any clarification.      I spent 51 minutes on the day of this encounter for preparing for, evaluating, treating, and managing this patient.    This includes face-to-face time and non face-to-face time and includes the following:  --preparing to see the patient (eg, obtaining and/or reviewing old records such as, when applicable, primary care notes, specialist notes, hospital notes, review of laboratory tests, and/or radiographic and/or cardiology or other studies)  --performing a medically appropriate review of systems and examination and/or evaluation  --reviewing and independently interpreting results (not separately reported; eg, lab results) and communicating results to the patient and/or family/caregiver  --placing orders and/or reviewing other physician's orders which can both include medications, laboratory studies, radiographic studies, procedures, referrals etcetera  and   --counseling and educating the patient and/or family member/caregiver regarding the treatment plan  --documentation of the visit in the electronic health record  --communicating with other health care providers regarding referrals, studies, follow-up, etc

## 2025-06-12 ENCOUNTER — OFFICE VISIT (OUTPATIENT)
Dept: INTERNAL MEDICINE | Facility: CLINIC | Age: 60
End: 2025-06-12
Payer: MEDICARE

## 2025-06-12 VITALS
HEART RATE: 76 BPM | WEIGHT: 244.69 LBS | SYSTOLIC BLOOD PRESSURE: 118 MMHG | RESPIRATION RATE: 14 BRPM | BODY MASS INDEX: 40.77 KG/M2 | OXYGEN SATURATION: 97 % | HEIGHT: 65 IN | TEMPERATURE: 98 F | DIASTOLIC BLOOD PRESSURE: 66 MMHG

## 2025-06-12 DIAGNOSIS — M25.561 CHRONIC PAIN OF BOTH KNEES: ICD-10-CM

## 2025-06-12 DIAGNOSIS — M17.0 PRIMARY OSTEOARTHRITIS OF BOTH KNEES: ICD-10-CM

## 2025-06-12 DIAGNOSIS — G89.4 CHRONIC PAIN SYNDROME: ICD-10-CM

## 2025-06-12 DIAGNOSIS — G89.29 CHRONIC PAIN OF BOTH KNEES: ICD-10-CM

## 2025-06-12 DIAGNOSIS — M47.816 SPONDYLOSIS OF LUMBAR REGION WITHOUT MYELOPATHY OR RADICULOPATHY: ICD-10-CM

## 2025-06-12 DIAGNOSIS — H00.011 HORDEOLUM EXTERNUM OF RIGHT UPPER EYELID: Primary | ICD-10-CM

## 2025-06-12 DIAGNOSIS — M25.562 CHRONIC PAIN OF BOTH KNEES: ICD-10-CM

## 2025-06-12 DIAGNOSIS — M51.369 DDD (DEGENERATIVE DISC DISEASE), LUMBAR: ICD-10-CM

## 2025-06-12 PROCEDURE — 99215 OFFICE O/P EST HI 40 MIN: CPT | Mod: PBBFAC,PO

## 2025-06-12 PROCEDURE — 99214 OFFICE O/P EST MOD 30 MIN: CPT | Mod: S$PBB,,,

## 2025-06-12 PROCEDURE — 99999 PR PBB SHADOW E&M-EST. PATIENT-LVL V: CPT | Mod: PBBFAC,,,

## 2025-06-12 RX ORDER — OXYCODONE AND ACETAMINOPHEN 10; 325 MG/1; MG/1
1 TABLET ORAL EVERY 8 HOURS PRN
Qty: 90 TABLET | Refills: 0 | Status: SHIPPED | OUTPATIENT
Start: 2025-06-15

## 2025-06-12 NOTE — PROGRESS NOTES
[facility] Note    Subjective:       Patient ID: Alivia Thomas is a 59 y.o. female.    Chief Complaint: Follow-up    HPI    History of Present Illness    CHIEF COMPLAINT:  Ms. Thomas presents today for follow up of eye infection.    EYE INFECTION:  She continues to experience mild eye itching but denies light sensitivity. Her previously reported headaches, including those associated with blinking, have resolved. She is currently on day 3 of Augmentin twice daily for 7 days and Ofloxacin eye drops every 6 hours.      ROS:  Eyes: denies eye pain, +eye itchiness  Neurological: denies headache         Review of Systems      Objective:      Physical Exam  Vitals and nursing note reviewed.   Constitutional:       Appearance: Normal appearance.   HENT:      Head: Normocephalic and atraumatic.   Eyes:      General:         Right eye: Hordeolum present.      Extraocular Movements:      Right eye: Normal extraocular motion.      Left eye: Normal extraocular motion.      Conjunctiva/sclera: Conjunctivae normal.      Pupils: Pupils are equal, round, and reactive to light.        Comments: See image below   Cardiovascular:      Rate and Rhythm: Normal rate and regular rhythm.      Pulses: Normal pulses.      Heart sounds: Normal heart sounds.   Pulmonary:      Effort: Pulmonary effort is normal.      Breath sounds: Normal breath sounds.   Skin:     General: Skin is warm.      Capillary Refill: Capillary refill takes less than 2 seconds.   Neurological:      General: No focal deficit present.      Mental Status: She is alert and oriented to person, place, and time.   Psychiatric:         Mood and Affect: Mood normal.         Behavior: Behavior normal.           Physical Exam                Assessment:       1. Hordeolum externum of right upper eyelid      Plan:       Assessment & Plan    IMPRESSION:  1. Assessed eye condition, noting significant improvement with reduced swelling and resolution of most  symptoms.  2. Confirmed effectiveness of prescribed antibiotic (Augmentin) and eye drops (Ofloxacin) in treating the eye infection.  3. Determined condition was likely a form of conjunctivitis, presenting as a stye.    PLAN SUMMARY:   Continue Augmentin twice daily for 7 days (currently on day 3)   Continue Ofloxacin eye drops every 6 hours for 7 days   Continue using baby shampoo for eye care   Apply warm compress to affected eye   Photo documented for Dr. Bergeron's review    ACUTE CONJUNCTIVITIS:   Noted significant improvement in the patient's eye condition with reduced swelling, absence of headaches, and no redness.   Advised the patient to continue using baby shampoo for eye care and to apply warm compress to affected eye to help draw out remaining infection.   Continue Ofloxacin eye drops every 6 hours for 7 days.   Continue Augmentin (antibiotic) twice daily for 7 days, currently on day 3.    FOLLOW-UP:   Documented improvement with photo for Dr. Bergeron's review.         -Continue current medications and maintain follow up with specialists.  Return to clinic in Follow up if symptoms worsen or fail to improve.     Kristyn Sanchez, MSN, APRN, FNP-C  [facility]    This note was generated with the assistance of ambient listening technology. Verbal consent was obtained by the patient and accompanying visitor(s) for the recording of patient appointment to facilitate this note. I attest to having reviewed and edited the generated note for accuracy, though some syntax or spelling errors may persist. Please contact the author of this note for any clarification.      I spent 30 minutes on the day of this encounter for preparing for, evaluating, treating, and managing this patient.    This includes face-to-face time and non face-to-face time and includes the following:  --preparing to see the patient (eg, obtaining and/or reviewing old records such as, when applicable, primary care notes,  specialist notes, hospital notes, review of laboratory tests, and/or radiographic and/or cardiology or other studies)  --performing a medically appropriate review of systems and examination and/or evaluation  --reviewing and independently interpreting results (not separately reported; eg, lab results) and communicating results to the patient and/or family/caregiver  --placing orders and/or reviewing other physician's orders which can both include medications, laboratory studies, radiographic studies, procedures, referrals etcetera and   --counseling and educating the patient and/or family member/caregiver regarding the treatment plan  --documentation of the visit in the electronic health record  --communicating with other health care providers regarding referrals, studies, follow-up, etc

## 2025-06-13 DIAGNOSIS — Z96.651 S/P REVISION OF TOTAL KNEE, RIGHT: Primary | ICD-10-CM

## 2025-06-13 DIAGNOSIS — Z96.652 STATUS POST TOTAL LEFT KNEE REPLACEMENT: ICD-10-CM

## 2025-06-16 ENCOUNTER — HOSPITAL ENCOUNTER (OUTPATIENT)
Dept: RADIOLOGY | Facility: HOSPITAL | Age: 60
Discharge: HOME | End: 2025-06-16
Attending: ORTHOPAEDIC SURGERY
Payer: MEDICARE

## 2025-06-16 ENCOUNTER — OFFICE VISIT (OUTPATIENT)
Dept: ORTHOPEDICS | Facility: CLINIC | Age: 60
End: 2025-06-16
Payer: MEDICARE

## 2025-06-16 VITALS — BODY MASS INDEX: 41.36 KG/M2 | WEIGHT: 248.56 LBS

## 2025-06-16 DIAGNOSIS — Z96.651 S/P REVISION OF TOTAL KNEE, RIGHT: Primary | ICD-10-CM

## 2025-06-16 DIAGNOSIS — Z96.651 S/P REVISION OF TOTAL KNEE, RIGHT: ICD-10-CM

## 2025-06-16 DIAGNOSIS — Z96.652 STATUS POST TOTAL LEFT KNEE REPLACEMENT: ICD-10-CM

## 2025-06-16 DIAGNOSIS — G89.29 CHRONIC PAIN OF RIGHT KNEE: ICD-10-CM

## 2025-06-16 DIAGNOSIS — M25.561 CHRONIC PAIN OF RIGHT KNEE: ICD-10-CM

## 2025-06-16 PROCEDURE — 73562 X-RAY EXAM OF KNEE 3: CPT | Mod: 26,50,, | Performed by: RADIOLOGY

## 2025-06-16 PROCEDURE — 99213 OFFICE O/P EST LOW 20 MIN: CPT | Mod: PBBFAC,25 | Performed by: ORTHOPAEDIC SURGERY

## 2025-06-16 PROCEDURE — 73562 X-RAY EXAM OF KNEE 3: CPT | Mod: TC,50

## 2025-06-16 PROCEDURE — 99999 PR PBB SHADOW E&M-EST. PATIENT-LVL III: CPT | Mod: PBBFAC,,, | Performed by: ORTHOPAEDIC SURGERY

## 2025-06-16 PROCEDURE — 99213 OFFICE O/P EST LOW 20 MIN: CPT | Mod: S$PBB,,, | Performed by: ORTHOPAEDIC SURGERY

## 2025-06-16 NOTE — PROGRESS NOTES
History of Present Illness    CHIEF COMPLAINT:  - Right knee pain s/p total knee arthroplasty revision    HPI:  - Presents for follow-up approximately 11 months after knee surgery  - Reports persistent pain in knee  - Pain is constant, primarily located in front of knee and radiating down leg  - Describes pain as more prominent in anterior aspect of knee and extending down leg  - Pain exacerbates when standing, getting up from a chair, or engaging in prolonged activities  - Weather changes affect her pain  - Denies pain in thigh area       Exam demonstrates  A well developed female in no distress.     Alert and oriented.  Mood and affect are appropriate.    Knee incision is well healed.  ROM is 0-120.  The patella tracks well and there is no instability. The extremity is neurovascularly intact.    Knee radiographs obtained today were compared to prior knee radiographs.  There is changes consistent with a complex right knee revision.  There is a well-fixed and positioned stemmed cemented tibial component.  The femoral component has a Press-Fit stem and there appears to be some subsidence.  She has what appears to be some migration of the femoral component on the left knee.      Imp:      At this point I believe her pain is patellofemoral in nature on the right.  I would like to get some new x-rays in 3 months to ensure that the femoral component is stabilized.  However she is having symptoms of pain in the left knee.  We will evaluate this when she returns also.  She may need a revision of the left knee.        F/u in 3 months with bilateral knee xrays    This note was generated with the assistance of ambient listening technology. Verbal consent was obtained by the patient and accompanying visitor(s) for the recording of patient appointment to facilitate this note. I attest to having reviewed and edited the generated note for accuracy, though some syntax or spelling errors may persist. Please contact the author of  this note for any clarification.

## 2025-07-07 ENCOUNTER — PATIENT MESSAGE (OUTPATIENT)
Dept: BARIATRICS | Facility: CLINIC | Age: 60
End: 2025-07-07
Payer: MEDICARE

## 2025-07-07 DIAGNOSIS — M25.561 CHRONIC PAIN OF BOTH KNEES: ICD-10-CM

## 2025-07-07 DIAGNOSIS — M51.369 DDD (DEGENERATIVE DISC DISEASE), LUMBAR: ICD-10-CM

## 2025-07-07 DIAGNOSIS — G89.29 CHRONIC PAIN OF BOTH KNEES: ICD-10-CM

## 2025-07-07 DIAGNOSIS — M47.816 SPONDYLOSIS OF LUMBAR REGION WITHOUT MYELOPATHY OR RADICULOPATHY: ICD-10-CM

## 2025-07-07 DIAGNOSIS — M17.0 PRIMARY OSTEOARTHRITIS OF BOTH KNEES: ICD-10-CM

## 2025-07-07 DIAGNOSIS — G89.4 CHRONIC PAIN SYNDROME: ICD-10-CM

## 2025-07-07 DIAGNOSIS — M25.562 CHRONIC PAIN OF BOTH KNEES: ICD-10-CM

## 2025-07-08 ENCOUNTER — PATIENT MESSAGE (OUTPATIENT)
Dept: BARIATRICS | Facility: CLINIC | Age: 60
End: 2025-07-08
Payer: MEDICARE

## 2025-07-08 RX ORDER — OXYCODONE AND ACETAMINOPHEN 10; 325 MG/1; MG/1
1 TABLET ORAL EVERY 8 HOURS PRN
Qty: 90 TABLET | Refills: 0 | Status: SHIPPED | OUTPATIENT
Start: 2025-07-16

## 2025-07-09 ENCOUNTER — HOSPITAL ENCOUNTER (EMERGENCY)
Facility: HOSPITAL | Age: 60
Discharge: HOME | End: 2025-07-09
Attending: EMERGENCY MEDICINE
Payer: MEDICARE

## 2025-07-09 ENCOUNTER — TELEPHONE (OUTPATIENT)
Dept: INTERNAL MEDICINE | Facility: CLINIC | Age: 60
End: 2025-07-09
Payer: MEDICARE

## 2025-07-09 VITALS
TEMPERATURE: 98 F | HEIGHT: 65 IN | HEART RATE: 67 BPM | SYSTOLIC BLOOD PRESSURE: 109 MMHG | DIASTOLIC BLOOD PRESSURE: 60 MMHG | WEIGHT: 240 LBS | OXYGEN SATURATION: 99 % | BODY MASS INDEX: 39.99 KG/M2 | RESPIRATION RATE: 20 BRPM

## 2025-07-09 DIAGNOSIS — E86.0 DEHYDRATION: Primary | ICD-10-CM

## 2025-07-09 DIAGNOSIS — I95.9 HYPOTENSION: ICD-10-CM

## 2025-07-09 LAB
ABSOLUTE EOSINOPHIL (OHS): 0.13 K/UL
ABSOLUTE MONOCYTE (OHS): 0.59 K/UL (ref 0.3–1)
ABSOLUTE NEUTROPHIL COUNT (OHS): 3.6 K/UL (ref 1.8–7.7)
ALBUMIN SERPL BCP-MCNC: 4.1 G/DL (ref 3.5–5.2)
ALP SERPL-CCNC: 81 UNIT/L (ref 40–150)
ALT SERPL W/O P-5'-P-CCNC: 20 UNIT/L (ref 10–44)
ANION GAP (OHS): 9 MMOL/L (ref 8–16)
AST SERPL-CCNC: 28 UNIT/L (ref 11–45)
BASOPHILS # BLD AUTO: 0.03 K/UL
BASOPHILS NFR BLD AUTO: 0.5 %
BILIRUB SERPL-MCNC: 0.6 MG/DL (ref 0.1–1)
BUN SERPL-MCNC: 26 MG/DL (ref 6–20)
CALCIUM SERPL-MCNC: 9.1 MG/DL (ref 8.7–10.5)
CHLORIDE SERPL-SCNC: 106 MMOL/L (ref 95–110)
CO2 SERPL-SCNC: 23 MMOL/L (ref 23–29)
CREAT SERPL-MCNC: 0.9 MG/DL (ref 0.5–1.4)
ERYTHROCYTE [DISTWIDTH] IN BLOOD BY AUTOMATED COUNT: 14.4 % (ref 11.5–14.5)
GFR SERPLBLD CREATININE-BSD FMLA CKD-EPI: >60 ML/MIN/1.73/M2
GLUCOSE SERPL-MCNC: 98 MG/DL (ref 70–110)
HCT VFR BLD AUTO: 37.8 % (ref 37–48.5)
HGB BLD-MCNC: 12.2 GM/DL (ref 12–16)
IMM GRANULOCYTES # BLD AUTO: 0 K/UL (ref 0–0.04)
IMM GRANULOCYTES NFR BLD AUTO: 0 % (ref 0–0.5)
LYMPHOCYTES # BLD AUTO: 1.76 K/UL (ref 1–4.8)
MAGNESIUM SERPL-MCNC: 2.2 MG/DL (ref 1.6–2.6)
MCH RBC QN AUTO: 30.7 PG (ref 27–31)
MCHC RBC AUTO-ENTMCNC: 32.3 G/DL (ref 32–36)
MCV RBC AUTO: 95 FL (ref 82–98)
NUCLEATED RBC (/100WBC) (OHS): 0 /100 WBC
OHS QRS DURATION: 70 MS
OHS QTC CALCULATION: 437 MS
PLATELET # BLD AUTO: 258 K/UL (ref 150–450)
PMV BLD AUTO: 11.3 FL (ref 9.2–12.9)
POTASSIUM SERPL-SCNC: 5.1 MMOL/L (ref 3.5–5.1)
PROT SERPL-MCNC: 8.5 GM/DL (ref 6–8.4)
RBC # BLD AUTO: 3.97 M/UL (ref 4–5.4)
RELATIVE EOSINOPHIL (OHS): 2.1 %
RELATIVE LYMPHOCYTE (OHS): 28.8 % (ref 18–48)
RELATIVE MONOCYTE (OHS): 9.7 % (ref 4–15)
RELATIVE NEUTROPHIL (OHS): 58.9 % (ref 38–73)
SODIUM SERPL-SCNC: 138 MMOL/L (ref 136–145)
WBC # BLD AUTO: 6.11 K/UL (ref 3.9–12.7)

## 2025-07-09 PROCEDURE — 83735 ASSAY OF MAGNESIUM: CPT | Performed by: EMERGENCY MEDICINE

## 2025-07-09 PROCEDURE — 93010 ELECTROCARDIOGRAM REPORT: CPT | Mod: ,,, | Performed by: INTERNAL MEDICINE

## 2025-07-09 PROCEDURE — 93005 ELECTROCARDIOGRAM TRACING: CPT

## 2025-07-09 PROCEDURE — 80053 COMPREHEN METABOLIC PANEL: CPT | Performed by: EMERGENCY MEDICINE

## 2025-07-09 PROCEDURE — 99285 EMERGENCY DEPT VISIT HI MDM: CPT | Mod: 25

## 2025-07-09 PROCEDURE — 85025 COMPLETE CBC W/AUTO DIFF WBC: CPT | Performed by: NURSE PRACTITIONER

## 2025-07-09 RX ORDER — NEBULIZER AND COMPRESSOR
EACH MISCELLANEOUS
Qty: 1 EACH | Refills: 0 | Status: SHIPPED | OUTPATIENT
Start: 2025-07-09

## 2025-07-09 NOTE — DISCHARGE INSTRUCTIONS
You were seen and evaluated in the ER today.  Your labs show that you are slightly dehydrated.  There were no other acute abnormalities.  Please increase fluids.  Please get a new blood pressure cuff due to likelihood of equipment malfunction showing UR hypotensive.  Please follow-up with your PCP as needed.  Please return to the ED for any worsening symptoms such as chest pain, shortness of breath, fever not controlled with Tylenol or ibuprofen or uncontrolled pain.      Our goal in the emergency department is to always give you outstanding care and exceptional service. You may receive a survey by mail or e-mail in the next week regarding your experience in our ED. We would greatly appreciate your completing and returning the survey. Your feedback provides us with a way to recognize our staff who give very good care and it helps us learn how to improve when your experience was below our aspiration of excellence.

## 2025-07-09 NOTE — ED TRIAGE NOTES
Alivai Thomas, a 59 y.o. female presents to the ED w/ complaint of low blood pressure reading at home. Denies any symptoms.    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

## 2025-07-09 NOTE — TELEPHONE ENCOUNTER
Pt mentioned that she went to the ED because she had a low blood pressure of 60/43. Pt states that the ED mentioned her blood pressure cuff wasn't good, and received a rx for a new one. Pt  also stated they mentioned she was dehydrated but didn't need to be provided with fluids from the ED.The pt BP was 108/62 when she was leaving the ED.

## 2025-07-09 NOTE — ED PROVIDER NOTES
Source of History:  Patient, chart    Chief complaint:  Hypotension (States yesterday and today BP was in the 70s at home, denies sx, reports admitted for low BP )      HPI:  Alivia Thomas is a 59 y.o. female with medical history of proximal AFib, depression, stroke, DJD, diabetes, GERD, hypertension, chronic pain presenting with hypotension.  Patient states she took her blood pressure at home with a wrist blood pressure machine and it was systolic likely in the 70s.  Patient denies any symptoms at that time.  Patient came to the ED for evaluation due to continued hypotension.  Upon arrival to ED patient's blood pressure within normal limits but wrist cuff is showing a systolic in the 80s.  Patient denies any dizziness, weakness, chest pain or shortness of breath.    This is the extent to the patients complaints today here in the emergency department.    ROS: As per HPI     Review of patient's allergies indicates:   Allergen Reactions    Strawberry Anaphylaxis    Allergenic extract-strawberry        PMH:  As per HPI and below:  Past Medical History:   Diagnosis Date    Acute right MCA stroke 01/04/2024    Allergy     Anemia     Asthma     Bilateral shoulder bursitis     Cervical stenosis of spine     Chronic pain     DDD (degenerative disc disease), cervical     Depression 01/28/2019    Diabetes mellitus type II     DJD (degenerative joint disease) of knee 06/19/2014    Facet arthritis of lumbar region 12/17/2015    Facet syndrome 12/17/2015    GERD (gastroesophageal reflux disease)     Heartburn     Hyperlipidemia     Hypertension     Morbid obesity     Neuromuscular disorder     PADDY (obstructive sleep apnea)     Paroxysmal atrial fibrillation 03/19/2024    Proteinuria     Right carpal tunnel syndrome     Sacroiliitis 06/13/2018    Sacroiliitis     Sleep apnea     Uterine fibroid      Past Surgical History:   Procedure Laterality Date    CARPAL TUNNEL RELEASE      CARPAL TUNNEL RELEASE  1980s    left    CARPAL  TUNNEL RELEASE  2012    right    CATARACT EXTRACTION W/  INTRAOCULAR LENS IMPLANT Left 08/08/2023    DR. STOKES    CATARACT EXTRACTION W/  INTRAOCULAR LENS IMPLANT Right 08/29/2023        COLONOSCOPY N/A 06/15/2020    Procedure: COLONOSCOPY;  Surgeon: Jc Koo MD;  Location: [facility] (4TH FLR);  Service: Endoscopy;  Laterality: N/A;  COVID screening on 6/13/20 at [facility]  pt updated on drop off location and no visitor policy-rb    COLONOSCOPY, SCREENING, LOW RISK PATIENT N/A 4/14/2025    Procedure: COLONOSCOPY, SCREENING, LOW RISK PATIENT;  Surgeon: Charlotte Cooney MD;  Location: [facility] (4TH FLR);  Service: Endoscopy;  Laterality: N/A;  ref Dr drake peg  eliquis ozempic  portal tmb  okay to hold eliquis 2 days for EGD/colonoscopy per Dr Morton-tmb  4/4 precall complete. reviewed prep instructions. will resend through portal. will hold ozempic/eliquis. kw    EPIDURAL STEROID INJECTION N/A 07/24/2023    Procedure: INJECTION, STEROID, EPIDURAL, L5/S1 SOONER DATE;  Surgeon: Israel Hurtado MD;  Location: [facility] PAIN MGT;  Service: Pain Management;  Laterality: N/A;    ESOPHAGOGASTRODUODENOSCOPY N/A 03/27/2019    Procedure: EGD (ESOPHAGOGASTRODUODENOSCOPY);  Surgeon: Robbin Bar MD;  Location: [facility] (2ND FLR);  Service: Endoscopy;  Laterality: N/A;  BMI 61.3/2nd floor case/svn    ESOPHAGOGASTRODUODENOSCOPY N/A 4/14/2025    Procedure: EGD (ESOPHAGOGASTRODUODENOSCOPY);  Surgeon: Charlotte Cooney MD;  Location: [facility] ENDO (4TH FLR);  Service: Endoscopy;  Laterality: N/A;    INJECTION OF JOINT Bilateral 06/09/2020    Procedure: INJECTION, JOINT, BILATERAL SI;  Surgeon: Lina Wagner MD;  Location: [facility] PAIN MGT;  Service: Pain Management;  Laterality: Bilateral;  B/L SI Joint Injection    INJECTION OF JOINT Bilateral 05/11/2021    Procedure: INJECTION, JOINT, SACROILIAC (SI) need consent;  Surgeon: Lina Wagner MD;  Location: [facility];  Service: Pain  Management;  Laterality: Bilateral;    INJECTION OF JOINT Bilateral 5/16/2024    Procedure: INJECTION, JOINT BILATERAL SI AND GTB;  Surgeon: Israel Hurtado MD;  Location: [facility] PAIN MGT;  Service: Pain Management;  Laterality: Bilateral;  [phone]  2 WK F/U BENJI    JOINT REPLACEMENT Bilateral     with 2 revisions on rt    KNEE SURGERY  03/2010    orthroscope    KNEE SURGERY  06/19/2014    left TKR    LAPAROSCOPIC SLEEVE GASTRECTOMY N/A 08/28/2019    Procedure: GASTRECTOMY, SLEEVE, LAPAROSCOPIC with intraop EGD;  Surgeon: Heriberto Clements MD;  Location: [facility] OR 2ND FLR;  Service: General;  Laterality: N/A;    PANNICULECTOMY N/A 06/14/2022    Procedure: PANNICULECTOMY;  Surgeon: Rhett Gray MD;  [facility];  Service: Plastics;  Laterality: N/A;    RADIOFREQUENCY ABLATION Right 07/09/2019    Procedure: RADIOFREQUENCY ABLATION;  Surgeon: Lina Wagner MD;  Location: [facility] PAIN MGT;  Service: Pain Management;  Laterality: Right;  RIGHT RFA L2,3,4,5  2 of 2    RADIOFREQUENCY ABLATION Left 12/19/2019    Procedure: RADIOFREQUENCY ABLATION, LEFT L2-L3-L4-L5 MEDIAL BRANCH 1 OF 2;  Surgeon: Lina Wagner MD;  Location: [facility] PAIN MGT;  Service: Pain Management;  Laterality: Left;    RADIOFREQUENCY ABLATION Right 12/31/2019    Procedure: RADIOFREQUENCY ABLATION, RIGHT L2-L3-L4-L5  2 OF 2;  Surgeon: Lina Wagner MD;  Location: [facility] PAIN MGT;  Service: Pain Management;  Laterality: Right;    RADIOFREQUENCY ABLATION Right 10/13/2020    Procedure: RADIOFREQUENCY ABLATION, Genicular Cooled 1 of 2;  Surgeon: Lina Wagner MD;  Location: [facility] PAIN MGT;  Service: Pain Management;  Laterality: Right;    RADIOFREQUENCY ABLATION Left 11/03/2020    Procedure: RADIOFREQUENCY ABLATION, GENICULAR COOLED  2 OF 2;  Surgeon: Lina Wagner MD;  Location: [facility] PAIN MGT;  Service: Pain Management;  Laterality: Left;    RADIOFREQUENCY ABLATION Left 12/15/2020    Procedure: RADIOFREQUENCY  ABLATION LEFT L2,3,4,5 1 of 2;  Surgeon: Lina Wagner MD;  Location: [facility] PAIN MGT;  Service: Pain Management;  Laterality: Left;    RADIOFREQUENCY ABLATION Right 12/29/2020    Procedure: RADIOFREQUENCY ABLATION RIGHT L2,3,4,5 2 of 2;  Surgeon: Lina Wagner MD;  Location: [facility] PAIN MGT;  Service: Pain Management;  Laterality: Right;    RADIOFREQUENCY ABLATION Left 06/29/2021    Procedure: RADIOFREQUENCY ABLATION GENICULAR COOLED;  Surgeon: Lina Wagner MD;  Location: [facility] PAIN MGT;  Service: Pain Management;  Laterality: Left;  1 of 2    RADIOFREQUENCY ABLATION Right 07/13/2021    Procedure: RADIOFREQUENCY ABLATION GENICULAR;  Surgeon: Lina Wagner MD;  Location: [facility] PAIN MGT;  Service: Pain Management;  Laterality: Right;  2 of 2    RADIOFREQUENCY ABLATION Right 08/24/2021    Procedure: RADIOFREQUENCY ABLATION, L2-L3-L4-L5 MEDIAL BRANCH 1 OF 2;  Surgeon: Lina Wagner MD;  Location: [facility] PAIN MGT;  Service: Pain Management;  Laterality: Right;    RADIOFREQUENCY ABLATION Left 09/11/2021    Procedure: RADIOFREQUENCY ABLATION, L2-L3-L4-L5 MEDIAL BR ANCH 2 OF 2;  Surgeon: Lina Wagner MD;  Location: [facility] PAIN MGT;  Service: Pain Management;  Laterality: Left;    RADIOFREQUENCY ABLATION Right 03/07/2022    Procedure: RADIOFREQUENCY ABLATION RIGHT L3,4,5 1 of 2, needs consent;  Surgeon: Israel Hurtado MD;  Location: [facility] PAIN MGT;  Service: Pain Management;  Laterality: Right;    RADIOFREQUENCY ABLATION Left 03/21/2022    Procedure: RADIOFREQUENCY ABLATION RIGHT L3,4,5 2 of 2, needs consent;  Surgeon: Israel Hurtado MD;  Location: [facility] PAIN MGT;  Service: Pain Management;  Laterality: Left;    RADIOFREQUENCY ABLATION Right 05/09/2022    Procedure: RADIOFREQUENCY ABLATION RIGHT GENICULAR COOLED 1 of 2;  Surgeon: Israel Hurtado MD;  Location: [facility] PAIN MGT;  Service: Pain Management;  Laterality: Right;    RADIOFREQUENCY ABLATION Left 05/23/2022    Procedure: RADIOFREQUENCY ABLATION LEFT  GENICULAR COOLED  2 of 2;  Surgeon: Israel Hurtado MD;  Location: [facility] PAIN MGT;  Service: Pain Management;  Laterality: Left;    RADIOFREQUENCY ABLATION Right 12/12/2022    Procedure: RADIOFREQUENCY ABLATION RIGHT GENICULAR COOLED  ONE OF TWO  NEEDS CONSENT;  Surgeon: Israel Hurtado MD;  Location: [facility] PAIN MGT;  Service: Pain Management;  Laterality: Right;    RADIOFREQUENCY ABLATION Left 01/09/2023    Procedure: RADIOFREQUENCY ABLATION LEFT GENICULAR COOLED  TWO OF TWO NEEDS CONSENT;  Surgeon: Israel Hurtado MD;  Location: [facility] PAIN MGT;  Service: Pain Management;  Laterality: Left;    RADIOFREQUENCY ABLATION Right 03/06/2023    Procedure: RADIOFREQUENCY ABLATION RIGHT L3,L4,L5;  Surgeon: Israel Hurtado MD;  Location: [facility] PAIN MGT;  Service: Pain Management;  Laterality: Right;    RADIOFREQUENCY ABLATION Left 05/22/2023    Procedure: RADIOFREQUENCY ABLATION LEFT L3,L4,L5;  Surgeon: Israel Hurtado MD;  Location: [facility] PAIN MGT;  Service: Pain Management;  Laterality: Left;  4/3 LM TO R/S    RADIOFREQUENCY ABLATION Right 11/27/2023    Procedure: RADIOFREQUENCY ABLATION RIGHT L3, 4, 5 1 OF 3;  Surgeon: Israel Hurtado MD;  Location: [facility] PAIN MGT;  Service: Pain Management;  Laterality: Right;    RADIOFREQUENCY ABLATION Right 01/22/2024    Procedure: RADIOFREQUENCY ABLATION RIGHT GENICULAR 3 NERVES 3 OF 3;  Surgeon: Israel Hurtado MD;  Location: [facility] PAIN MGT;  Service: Pain Management;  Laterality: Right;    RADIOFREQUENCY ABLATION Left 02/12/2024    Procedure: RADIOFREQUENCY ABLATION LEFT L3, 4, 5;  Surgeon: Israel Hurtado MD;  Location: [facility] PAIN MGT;  Service: Pain Management;  Laterality: Left;  [phone]    RADIOFREQUENCY ABLATION OF LUMBAR MEDIAL BRANCH NERVE AT SINGLE LEVEL Left 06/26/2018    Procedure: RADIOFREQUENCY ABLATION, NERVE, MEDIAL BRANCH, LUMBAR, 1 LEVEL;  Surgeon: Lina Wagner MD;  Location: [facility] PAIN MGT;  Service: Pain Management;  Laterality: Left;  Left Cooled RFA @ L2,3,4,5  [phone]  with  "Sedation    1 of 2    RADIOFREQUENCY ABLATION OF LUMBAR MEDIAL BRANCH NERVE AT SINGLE LEVEL Right 07/10/2018    Procedure: RADIOFREQUENCY ABLATION, NERVE, MEDIAL BRANCH, LUMBAR, 1 LEVEL;  Surgeon: Lina Wagner MD;  Location: [facility] PAIN MGT;  Service: Pain Management;  Laterality: Right;  RIght Cooled RFA @ L2,3,4,5  [id number] with Sedation    2 of 2    REVISION OF KNEE ARTHROPLASTY Right 7/18/2024    Procedure: REVISION, ARTHROPLASTY, KNEE: RIGHT;  Surgeon: Theodore Swan MD;  Location: [facility] OR [facility] FLR;  Service: Orthopedics;  Laterality: Right;    TRANSFORAMINAL EPIDURAL INJECTION OF STEROID N/A 1/13/2025    Procedure: LUMBAR L5/S1 IL KERI *ELIQUIS CLEARANCE IN CHART*;  Surgeon: Israel Hurtado MD;  Location: [facility] PAIN MGT;  Service: Pain Management;  Laterality: N/A;  2 WK F/U SUNDEEP    TRIGGER FINGER RELEASE Right 2017    TRIGGER FINGER RELEASE Left 07/29/2019    Procedure: RELEASE, TRIGGER FINGER, Left Thumb;  Surgeon: Velma Garcia MD;  Location: [facility];  Service: Orthopedics;  Laterality: Left;  Local w/ MAC       Social History[1]    Physical Exam:    /62   Pulse 80   Temp 97.9 °F (36.6 °C) (Oral)   Resp 18   Ht 5' 5" (1.651 m)   Wt 108.9 kg (240 lb)   LMP 06/26/2018   SpO2 98%   BMI 39.94 kg/m²   Nursing note and vital signs reviewed.  Constitutional: No acute distress.  Nontoxic  Eyes: No conjunctival injection. Extraocular muscles intact.  ENT: Normal phonation.  Musculoskeletal: Good range of motion all joints.  No deformities.  Neck supple.  No meningismus. Neurovascularly intact.  Skin: No rashes seen.  Good turgor.  No abrasions.  No ecchymoses.  Psych:  Alert and oriented x 3.  Appropriate, conversant.    MDM    Emergent evaluation of a 60 yo female presenting for hypotension.  Patient denies any symptoms but noticed her blood pressure was systolic lay in the 70s at home prior to coming to the ED.  Upon arrival to ED patient's blood pressure within normal limits.  Patient " rechecked her blood pressure with her home wrist cuff and showed systolic 85.  Patient denies any dizziness, chest pain, shortness of breath or weakness.  Patient does have history of hypertension but is not currently on any blood pressure medications.  Patient denies any medication changes or taking any pain medication this morning  On exam pt is A&Ox3. VSS. Nonfebrile and nontoxic appearing.  Mucous membranes pink and moist. Breath sounds clear bilaterally. Pt speaking in full sentences.  Steady gait appreciated. Cap refill < 3 seconds.  No dizziness with standing.  Patient denies any complaints.    History Acquisition   Additional history was acquired from other historians.  Chart    The patient's list of active medical problems, social history, medications, and allergies as documented per RN staff has been reviewed.     Differential Diagnoses   Based on available information and the initial assessment, the working differential diagnoses considered during this evaluation include but are not limited to medication reaction, orthostatic hypotension, dehydration, electrolyte abnormality, arrhythmia, equipment malfunction, others.    I will get labs, EKG and reassess.  I discussed this case with my supervising physician.      LABS     Labs Reviewed   CBC WITH DIFFERENTIAL - Abnormal       Result Value    WBC 6.11      RBC 3.97 (*)     HGB 12.2      HCT 37.8      MCV 95      MCH 30.7      MCHC 32.3      RDW 14.4      Platelet Count 258      MPV 11.3      Nucleated RBC 0      Neut % 58.9      Lymph % 28.8      Mono % 9.7      Eos % 2.1      Basophil % 0.5      Imm Grans % 0.0      Neut # 3.60      Lymph # 1.76      Mono # 0.59      Eos # 0.13      Baso # 0.03      Imm Grans # 0.00     COMPREHENSIVE METABOLIC PANEL - Abnormal    Sodium 138      Potassium 5.1      Chloride 106      CO2 23      Glucose 98      BUN 26 (*)     Creatinine 0.9      Calcium 9.1      Protein Total 8.5 (*)     Albumin 4.1      Bilirubin Total 0.6       ALP 81      AST 28      ALT 20      Anion Gap 9      eGFR >60     MAGNESIUM - Normal    Magnesium  2.2     CBC W/ AUTO DIFFERENTIAL    Narrative:     The following orders were created for panel order CBC auto differential.  Procedure                               Abnormality         Status                     ---------                               -----------         ------                     CBC with Differential[[id number]]       Abnormal            Final result                 Please view results for these tests on the individual orders.         Imaging     Imaging Results              X-Ray Chest AP Portable (Final result)  Result time 07/09/25 14:09:13      Final result by Barber Valdez III, MD (07/09/25 14:09:13)                   Impression:      No acute process seen.      Electronically signed by: Barber Valdez MD  Date:    07/09/2025  Time:    14:09               Narrative:    EXAMINATION:  XR CHEST AP PORTABLE    CLINICAL HISTORY:  Hypotension, unspecified    FINDINGS:  Chest one view:    There is borderline cardiomegaly and possible pulmonary artery hypertension.  There is aortic plaque.  There is mild DJD.  Lungs are clear.                                      A radiology report was available for my review at the time of the encounter.    EKG   EKG Readings: (Independently Interpreted)   Initial Reading: No STEMI. Previous EKG: Compared with most recent EKG Previous EKG Date: 8/4/24. Rhythm: Sinus Arrhythmia. Heart Rate: 67. Ectopy: PACs.   My independent interpretation    No STEMI  Sinus rhythm with PACs  Rate of 67       Additional Consideration   All available testing was considered during the course of this workup.  Comorbidities taken into consideration during the patient's evaluation and treatment include weight, age.    Social determinants of health were taken into consideration during development of our treatment plan.    Medications - No data to display   ED Course as of 07/09/25  1423   Wed Jul 09, 2025   1342 CBC unremarkable.  No leukocytosis noted.  H&H stable at 12.2 and 37.8.  CMP shows a BUN of 26 with a creatinine of 0.9.  Mag within normal limits.  Awaiting chest x-ray for disposition. [RZ]   1415 Chest x-ray independently reviewed by myself and Radiology.  Negative for any acute abnormalities.  Patient reassessed.  Patient updated on results.  Advised her symptoms could be due to blood pressure cuff mouth function.  Advised to monitor her blood pressure at home.  Advised her labs do show some mild dehydration.  Advised to increase hydration.  Return precautions discussed.  Patient verbalized understanding of this plan of care.  All questions and concerns addressed. [RZ]   1422 Patient is hemodynamically stable, vital signs are normal. Discharge instructions given. Return to ED precautions discussed. Follow up as directed. Pt verbalized understanding of this plan.  Pt is stable for discharge.  [RZ]      ED Course User Index  [RZ] Rocio Quinones NP             CLINICAL IMPRESSION  1. Dehydration    2. Hypotension         ED Disposition Condition    Discharge Stable            Instruction:  I see no indication of an emergent process beyond that addressed during our encounter but have duly counseled the patient/family regarding the need for prompt follow-up as well as the indications that should prompt immediate return to the emergency room should new or worrisome developments occur.  The patient/family has been provided with verbal and printed direction regarding our final diagnosis(es) as well as instructions regarding use of OTC and/or Rx medications intended to manage the patient's aforementioned conditions including:  ED Prescriptions       Medication Sig Dispense Start Date End Date Auth. Provider    BLOOD PRESSURE CUFF Misc Please give 1 blood pressure cuff to monitor blood pressure at home. 1 each 7/9/2025 -- Rocio Quinones NP          Patient has been advised of following  recommended follow-up instructions:  Follow-up Information       Follow up With Specialties Details Why Contact Info    Clarisse Richardson MD Internal Medicine Schedule an appointment as soon as possible for a visit  As needed [id number]  [facility] [address]  [phone]            The patient/family communicates understanding of all this information and all remaining questions and concerns were addressed at this time.      The patient's condition did not warrant review of the  and prescription of controlled substances.      This note was created using dictation software.  This program may occasionally mistype words and phrases.      Launch MDCalc MDM  MDCalc MDM Module  Jul 09 2025 2:22 PM [Rocio Quinones]  Data:  - Independent interpretation: I independently reviewed the XR port Chest AP 1 view, EKG 12-lead. It showed no acute abnormality. See MDM section and/or ED Course for my interpretation. [Rocio Quinones]  - Test/documents/historian: 3+ tests ordered  Problems: Hypotension  Additional encounter diagnoses: Dehydration  Risk: XR port Chest AP 1 view (X-ray)           [1]   Social History  Tobacco Use    Smoking status: Never     Passive exposure: Never    Smokeless tobacco: Never   Substance Use Topics    Alcohol use: Not Currently    Drug use: No        Rocio Quinones NP  07/09/25 [id number]

## 2025-07-22 NOTE — TELEPHONE ENCOUNTER
----- Message from Tamiko Gee sent at 8/8/2022  9:55 AM CDT -----  Can the clinic reply in [facility]: No         Please refill the medication(s) listed below. Please call the patient when the prescription(s) is ready for  at this phone number   [phone]         Medication #1 oxyCODONE-acetaminophen (PERCOCET)  mg per tablet                  [facility] [address]     [place]

## 2025-08-10 ENCOUNTER — TELEPHONE (OUTPATIENT)
Dept: INTERNAL MEDICINE | Facility: CLINIC | Age: 60
End: 2025-08-10
Payer: MEDICARE

## 2025-08-11 ENCOUNTER — OFFICE VISIT (OUTPATIENT)
Dept: PODIATRY | Facility: CLINIC | Age: 60
End: 2025-08-11
Payer: MEDICARE

## 2025-08-11 VITALS
BODY MASS INDEX: 40 KG/M2 | WEIGHT: 240.06 LBS | SYSTOLIC BLOOD PRESSURE: 134 MMHG | DIASTOLIC BLOOD PRESSURE: 83 MMHG | HEART RATE: 77 BPM | HEIGHT: 65 IN

## 2025-08-11 DIAGNOSIS — B35.1 DERMATOPHYTOSIS OF NAIL: Chronic | ICD-10-CM

## 2025-08-11 DIAGNOSIS — L84 CORN OR CALLUS: Chronic | ICD-10-CM

## 2025-08-11 DIAGNOSIS — M79.671 BILATERAL FOOT PAIN: Chronic | ICD-10-CM

## 2025-08-11 DIAGNOSIS — Z96.651 S/P REVISION OF TOTAL KNEE, RIGHT: ICD-10-CM

## 2025-08-11 DIAGNOSIS — E11.49 TYPE II DIABETES MELLITUS WITH NEUROLOGICAL MANIFESTATIONS: Primary | ICD-10-CM

## 2025-08-11 DIAGNOSIS — M79.672 BILATERAL FOOT PAIN: Chronic | ICD-10-CM

## 2025-08-11 DIAGNOSIS — Z79.01 CHRONIC ANTICOAGULATION: ICD-10-CM

## 2025-08-11 DIAGNOSIS — L85.3 DRY SKIN: Chronic | ICD-10-CM

## 2025-08-11 PROCEDURE — 99214 OFFICE O/P EST MOD 30 MIN: CPT | Mod: PBBFAC,PN | Performed by: PODIATRIST

## 2025-08-11 PROCEDURE — 99999 PR PBB SHADOW E&M-EST. PATIENT-LVL IV: CPT | Mod: PBBFAC,,, | Performed by: PODIATRIST

## 2025-08-11 PROCEDURE — 11056 PARNG/CUTG B9 HYPRKR LES 2-4: CPT | Mod: PBBFAC,PN | Performed by: PODIATRIST

## 2025-08-11 RX ORDER — UREA 40 %
CREAM (GRAM) TOPICAL DAILY
Qty: 120 G | Refills: 3 | Status: SHIPPED | OUTPATIENT
Start: 2025-08-11

## 2025-08-11 RX ORDER — LIDOCAINE AND PRILOCAINE 25; 25 MG/G; MG/G
CREAM TOPICAL
Qty: 30 G | Refills: 2 | Status: SHIPPED | OUTPATIENT
Start: 2025-08-11

## 2025-08-12 ENCOUNTER — PATIENT MESSAGE (OUTPATIENT)
Dept: BARIATRICS | Facility: CLINIC | Age: 60
End: 2025-08-12
Payer: MEDICARE

## 2025-08-13 RX ORDER — DEXTROMETHORPHAN HYDROBROMIDE, GUAIFENESIN 5; 100 MG/5ML; MG/5ML
650 LIQUID ORAL EVERY 8 HOURS PRN
Qty: 90 TABLET | Refills: 1 | Status: SHIPPED | OUTPATIENT
Start: 2025-08-13

## 2025-08-18 ENCOUNTER — TELEPHONE (OUTPATIENT)
Dept: PAIN MEDICINE | Facility: CLINIC | Age: 60
End: 2025-08-18
Payer: MEDICARE

## 2025-08-18 ENCOUNTER — OFFICE VISIT (OUTPATIENT)
Dept: PAIN MEDICINE | Facility: CLINIC | Age: 60
End: 2025-08-18
Payer: MEDICARE

## 2025-08-18 DIAGNOSIS — M47.816 SPONDYLOSIS OF LUMBAR REGION WITHOUT MYELOPATHY OR RADICULOPATHY: ICD-10-CM

## 2025-08-18 DIAGNOSIS — G89.4 CHRONIC PAIN SYNDROME: Primary | ICD-10-CM

## 2025-08-18 DIAGNOSIS — M51.369 DDD (DEGENERATIVE DISC DISEASE), LUMBAR: ICD-10-CM

## 2025-08-18 DIAGNOSIS — M17.0 PRIMARY OSTEOARTHRITIS OF BOTH KNEES: ICD-10-CM

## 2025-08-18 DIAGNOSIS — M25.561 CHRONIC PAIN OF BOTH KNEES: ICD-10-CM

## 2025-08-18 DIAGNOSIS — M47.816 LUMBAR SPONDYLOSIS: ICD-10-CM

## 2025-08-18 DIAGNOSIS — M10.9 GOUT, UNSPECIFIED CAUSE, UNSPECIFIED CHRONICITY, UNSPECIFIED SITE: ICD-10-CM

## 2025-08-18 DIAGNOSIS — Z51.81 ENCOUNTER FOR MONITORING OPIOID MAINTENANCE THERAPY: ICD-10-CM

## 2025-08-18 DIAGNOSIS — G89.29 CHRONIC PAIN OF BOTH KNEES: ICD-10-CM

## 2025-08-18 DIAGNOSIS — G89.4 CHRONIC PAIN SYNDROME: ICD-10-CM

## 2025-08-18 DIAGNOSIS — M25.562 CHRONIC PAIN OF BOTH KNEES: ICD-10-CM

## 2025-08-18 DIAGNOSIS — Z79.891 ENCOUNTER FOR MONITORING OPIOID MAINTENANCE THERAPY: ICD-10-CM

## 2025-08-18 DIAGNOSIS — M46.1 SACROILIITIS: ICD-10-CM

## 2025-08-18 PROCEDURE — 98005 SYNCH AUDIO-VIDEO EST LOW 20: CPT | Mod: 95,,, | Performed by: NURSE PRACTITIONER

## 2025-08-18 PROCEDURE — G2211 COMPLEX E/M VISIT ADD ON: HCPCS | Mod: 95,,, | Performed by: NURSE PRACTITIONER

## 2025-08-18 RX ORDER — ATORVASTATIN CALCIUM 80 MG/1
80 TABLET, FILM COATED ORAL DAILY
Qty: 90 TABLET | Refills: 1 | Status: SHIPPED | OUTPATIENT
Start: 2025-08-18

## 2025-08-18 RX ORDER — ALLOPURINOL 300 MG/1
300 TABLET ORAL 2 TIMES DAILY
Qty: 180 TABLET | Refills: 3 | Status: SHIPPED | OUTPATIENT
Start: 2025-08-18

## 2025-08-19 RX ORDER — OXYCODONE AND ACETAMINOPHEN 10; 325 MG/1; MG/1
1 TABLET ORAL EVERY 8 HOURS PRN
Qty: 90 TABLET | Refills: 0 | Status: SHIPPED | OUTPATIENT
Start: 2025-08-19

## (undated) DEVICE — DRESSING LEUKOPLAST FLEX 1X3IN

## (undated) DEVICE — RELOAD ECHELON FLEX GRN 60MM

## (undated) DEVICE — SOL IRR STRL WATER 500ML

## (undated) DEVICE — LOOP VESSEL BLUE MAXI

## (undated) DEVICE — SOL 9P NACL IRR PIC IL

## (undated) DEVICE — KIT TOTAL KNEE TKOFG OMC

## (undated) DEVICE — Device

## (undated) DEVICE — ELECTRODE REM PLYHSV RETURN 9

## (undated) DEVICE — SEE MEDLINE ITEM 146313

## (undated) DEVICE — NDL HYPO REG 25G X 1 1/2

## (undated) DEVICE — GLOVE BIOGEL SKINSENSE PI 7.5

## (undated) DEVICE — SOL BETADINE 5%

## (undated) DEVICE — APPLICATOR ARISTA FLEX XL

## (undated) DEVICE — TAPE CURAD PAPER 1INX1.5YD

## (undated) DEVICE — BANDAID STRIP PLASTIC 3/4X3

## (undated) DEVICE — SUT GUT PL. 4-0 27 FS-2

## (undated) DEVICE — TAPE SURG DURAPORE 2 X10YD

## (undated) DEVICE — KIT IRR SUCTION HND PIECE

## (undated) DEVICE — SYR SLIP TIP 1CC

## (undated) DEVICE — TRAY MINOR GEN SURG

## (undated) DEVICE — SUT VICRYL PLUS 2-0 CT1 18

## (undated) DEVICE — ADHESIVE DERMABOND ADVANCED

## (undated) DEVICE — SOL IRR NACL .9% 3000ML

## (undated) DEVICE — DRAPE T EXTRM SURG 121X128X90

## (undated) DEVICE — PACK LAPSCP/PELVSCPY III TIBRN

## (undated) DEVICE — TOURNIQUET SB QC DP 18X4IN

## (undated) DEVICE — BANDAGE ADHESIVE

## (undated) DEVICE — BOWL CEMENT

## (undated) DEVICE — SHEILD & GARTERS FOX METAL EYE

## (undated) DEVICE — APPLICATOR CHLORAPREP ORN 26ML

## (undated) DEVICE — RELOAD ECHELON FLEX BLU 60MM

## (undated) DEVICE — TROCAR ENDOPATH XCEL 12MM 10CM

## (undated) DEVICE — TROCAR ENDOPATH XCEL 5X100MM

## (undated) DEVICE — SUT STRATAFIX PDO 2-0 SH

## (undated) DEVICE — SHEARS HARMONIC 5CM 36CM

## (undated) DEVICE — HOOD T7 W/ PEEL AWAY LENS

## (undated) DEVICE — SUT ENDOLOOP PDSII 18 LIGA

## (undated) DEVICE — SET CEMENT (SCULP)

## (undated) DEVICE — DRAPE STERI INSTRUMENT 1018

## (undated) DEVICE — PAD COLD THERAPY KNEE WRAP ON

## (undated) DEVICE — BLADE SAG 13.0 X1.27X90

## (undated) DEVICE — TAPE SURG DURAPORE 2 SGL USE

## (undated) DEVICE — DRAPE SURG W/TWL 17 5/8X23

## (undated) DEVICE — SUT VICRYL PLUS 0 CT1 18IN

## (undated) DEVICE — BRACE KNEE T SCOPE PREMIER

## (undated) DEVICE — WARMER DRAPE STERILE LF

## (undated) DEVICE — PRESSURIZER HI VAC HIP KIT

## (undated) DEVICE — TROCAR ENDOPATH XCEL 12X100MM

## (undated) DEVICE — BANDAGE ELASTIC 2X5 VELCRO ST

## (undated) DEVICE — SUT 0 VICRYL / UR6 (J603)

## (undated) DEVICE — CONTAINER SPECIMEN OR STER 4OZ

## (undated) DEVICE — SUT TICRON BLUE 2-0 48 SK

## (undated) DEVICE — BANDAGE GAUZE 6PLY FLUFF 2X3

## (undated) DEVICE — DRAPE THREE-QTR REINF 53X77IN

## (undated) DEVICE — SEE MEDLINE ITEM 157131

## (undated) DEVICE — GLOVE BIOGEL ECLIPSE SZ 7

## (undated) DEVICE — CORD BIPOLAR 12 FOOT

## (undated) DEVICE — CANNULA ENDOPATH XCEL 5X100MM

## (undated) DEVICE — STAPLER ECHELON FLEX 60MM 44CM

## (undated) DEVICE — NDL SAFETY 22G X 1.5 ECLIPSE

## (undated) DEVICE — SHIELD EYE METAL FOX 50/BX

## (undated) DEVICE — SYR B-D DISP CONTROL 10CC100/C

## (undated) DEVICE — PACK UPPER EXTREMITY BAPTIST

## (undated) DEVICE — SCRUB 10% POVIDONE IODINE 4OZ

## (undated) DEVICE — SUT VICRYL+ 1 CT1 18IN

## (undated) DEVICE — PUMP COLD THERAPY

## (undated) DEVICE — TUBING HF INSUFFLATION W/ FLTR

## (undated) DEVICE — BLADE RECIP DOUBLE SIDED

## (undated) DEVICE — PAD UNDERPAD 30X30

## (undated) DEVICE — SCISSOR 5MMX35CM DIRECT DRIVE

## (undated) DEVICE — SOL NS 1000CC

## (undated) DEVICE — DRESSING N ADH OIL EMUL 3X3

## (undated) DEVICE — GAUZE SPONGE 4X4 12PLY

## (undated) DEVICE — TOURNIQUET SB QC DP 34X4IN

## (undated) DEVICE — SUT TICRON BLUE O SK

## (undated) DEVICE — FORCEP STRAIGHT DISP

## (undated) DEVICE — DRAPE INCISE IOBAN 2 23X33IN

## (undated) DEVICE — BLADE SURG CARBON STEEL SZ11

## (undated) DEVICE — SUT 4/0 18IN ETHILON BL P3

## (undated) DEVICE — GLOVE BIOGEL PI MICRO INDIC 7

## (undated) DEVICE — DISPOSABLE FLUTED HEADLESS PINS
Type: IMPLANTABLE DEVICE | Site: KNEE | Status: NON-FUNCTIONAL
Brand: INSTRUMENT
Removed: 2024-07-18

## (undated) DEVICE — LUBRICANT SURGILUBE 2 OZ

## (undated) DEVICE — BLADE SAGITTAL 18 X 1.27 X 90M

## (undated) DEVICE — POWDER ARISTA AH 3G